# Patient Record
Sex: FEMALE | Race: BLACK OR AFRICAN AMERICAN | NOT HISPANIC OR LATINO | Employment: FULL TIME | URBAN - METROPOLITAN AREA
[De-identification: names, ages, dates, MRNs, and addresses within clinical notes are randomized per-mention and may not be internally consistent; named-entity substitution may affect disease eponyms.]

---

## 2017-01-11 ENCOUNTER — TELEPHONE (OUTPATIENT)
Dept: RHEUMATOLOGY | Facility: PHYSICIAN GROUP | Age: 52
End: 2017-01-11

## 2017-01-11 DIAGNOSIS — M17.11 PRIMARY OSTEOARTHRITIS OF RIGHT KNEE: ICD-10-CM
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
## 2017-01-11 DIAGNOSIS — S70.01XA CONTUSION OF RIGHT HIP, INITIAL ENCOUNTER: ICD-10-CM

## 2017-01-11 DIAGNOSIS — S80.01XA CONTUSION OF RIGHT KNEE, INITIAL ENCOUNTER: ICD-10-CM

## 2017-01-11 RX ORDER — TRAMADOL HYDROCHLORIDE 50 MG/1
50 TABLET ORAL EVERY 4 HOURS PRN
Qty: 30 TAB | Refills: 0 | Status: CANCELLED | OUTPATIENT
Start: 2017-01-11

## 2017-01-11 NOTE — TELEPHONE ENCOUNTER
Was the patient seen in the last year in this department? Yes   No recent labs    Does patient have an active prescription for medications requested? No     Received Request Via: Pharmacy

## 2017-01-11 NOTE — TELEPHONE ENCOUNTER
Patient asked for refill of tramadol, I am reluctant to refill this medication as patient has received tramadol from 2 other physicians other than myself since October 2016. Patient received tramadol refill from a Dr. Mindy Marino November 22, 2016 and also from Dr. Chicho Ashford December 30, 2016  Recommend patient establish with one physician only to receive her tramadol refills

## 2017-01-12 DIAGNOSIS — S70.01XA CONTUSION OF RIGHT HIP, INITIAL ENCOUNTER: ICD-10-CM
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
## 2017-01-12 DIAGNOSIS — S80.01XA CONTUSION OF RIGHT KNEE, INITIAL ENCOUNTER: ICD-10-CM

## 2017-01-12 RX ORDER — TRAMADOL HYDROCHLORIDE 50 MG/1
TABLET ORAL
Qty: 60 TAB | Refills: 0 | Status: SHIPPED | OUTPATIENT
Start: 2017-01-12 | End: 2017-03-16

## 2017-01-12 RX ORDER — TRAMADOL HYDROCHLORIDE 50 MG/1
50 TABLET ORAL EVERY 4 HOURS PRN
Qty: 30 TAB | Refills: 0 | Status: CANCELLED | OUTPATIENT
Start: 2017-01-12

## 2017-01-12 NOTE — TELEPHONE ENCOUNTER
Call patient, will refill tramadol, advised patient to be very careful re-doctor shopping for medications, patient states understanding  Tramadol prescription written for patient

## 2017-01-12 NOTE — TELEPHONE ENCOUNTER
Spoke with patient and relayed your message. Karine states that both of those Dr's/ scripts were from an urgent care visit and they don't refill medications. She states she was seen both times for falls.  She is requesting you to refill her Ultram. Please Advise and Thank you

## 2017-01-16 ENCOUNTER — TELEPHONE (OUTPATIENT)
Dept: MEDICAL GROUP | Age: 52
End: 2017-01-16

## 2017-01-16 DIAGNOSIS — E66.9 OBESITY (BMI 30-39.9): ICD-10-CM

## 2017-01-16 NOTE — TELEPHONE ENCOUNTER
1. Name of Medication & Dose: Phentermine 37.5 mg    2. Name of Prescription Coverage Company & Phone #: [employer]    3. Date Prior Auth Submitted: 01/16/17    4. What information was given to obtain insurance decision?Osteoarthritis of R knee, Obesity     5. Prior Auth Letter Approved or Denied? pending    6. Action Taken: Pharmacy/Patient Notified N\A

## 2017-01-18 NOTE — TELEPHONE ENCOUNTER
FINAL PRIOR AUTHORIZATION STATUS:    1.  Name of Medication & Dose: Phentermine 37.5 mg     2. Prior Auth Status: Approved through 1/1/17 - 12/31/17     3. Action Taken: Pharmacy/Patient Notified: yes

## 2017-02-06 ENCOUNTER — APPOINTMENT (OUTPATIENT)
Dept: RHEUMATOLOGY | Facility: PHYSICIAN GROUP | Age: 52
End: 2017-02-06
Payer: COMMERCIAL

## 2017-02-14 ENCOUNTER — OFFICE VISIT (OUTPATIENT)
Dept: URGENT CARE | Facility: CLINIC | Age: 52
End: 2017-02-14
Payer: COMMERCIAL

## 2017-02-14 VITALS
TEMPERATURE: 97.5 F | RESPIRATION RATE: 18 BRPM | OXYGEN SATURATION: 98 % | DIASTOLIC BLOOD PRESSURE: 80 MMHG | SYSTOLIC BLOOD PRESSURE: 128 MMHG | HEART RATE: 72 BPM

## 2017-02-14 DIAGNOSIS — L03.115 CELLULITIS OF HIP, RIGHT: ICD-10-CM
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
## 2017-02-14 DIAGNOSIS — S71.031A: ICD-10-CM

## 2017-02-14 PROCEDURE — 99213 OFFICE O/P EST LOW 20 MIN: CPT | Performed by: NURSE PRACTITIONER

## 2017-02-14 RX ORDER — AMOXICILLIN AND CLAVULANATE POTASSIUM 875; 125 MG/1; MG/1
1 TABLET, FILM COATED ORAL 2 TIMES DAILY
Qty: 20 TAB | Refills: 0 | Status: SHIPPED | OUTPATIENT
Start: 2017-02-14 | End: 2017-03-16

## 2017-02-14 RX ORDER — CEFTRIAXONE 1 G/1
500 INJECTION, POWDER, FOR SOLUTION INTRAMUSCULAR; INTRAVENOUS ONCE
Status: COMPLETED | OUTPATIENT
Start: 2017-02-14 | End: 2017-02-14

## 2017-02-14 RX ADMIN — CEFTRIAXONE 500 MG: 1 INJECTION, POWDER, FOR SOLUTION INTRAMUSCULAR; INTRAVENOUS at 18:40

## 2017-02-14 ASSESSMENT — ENCOUNTER SYMPTOMS
CHILLS: 0
FEVER: 0

## 2017-02-14 NOTE — MR AVS SNAPSHOT
Karine Ovalle   2017 6:00 PM   Office Visit   MRN: [id number]    Department:  [facility]   Dept Phone:  [phone]    Description:  Female : 1965   Provider:  Cathey J Hamman, A.P.N.           Reason for Visit     Fall Trip in a dog toy last night, fell down and hurt right hip, dog bite .      Allergies as of 2017     No Known Allergies      You were diagnosed with     Dog bite, initial encounter   [141500]       Puncture wound of hip, right, initial encounter   [308693]       Cellulitis of hip, right   [583414]         Vital Signs     Blood Pressure Pulse Temperature Respirations Oxygen Saturation Last Menstrual Period    128/80 mmHg 72 36.4 °C (97.5 °F) 18 98% 2016    Smoking Status                   Never Smoker            Basic Information     Date Of Birth Sex Race Ethnicity Preferred Language    1965 Female Black or  Non- English      Your appointments     Feb 15, 2017  8:45 AM   Follow Up Visit with Sandy Jenkins M.D.   [facility] (--)    [address], [address]  [facility] [address] [phone]           You will be receiving a confirmation call a few days before your appointment from our automated call confirmation system.            2017  2:20 PM   Established Patient with Micky Rubin M.D.   [address])    [address] [facility] [address] [phone]           You will be receiving a confirmation call a few days before your appointment from our automated call confirmation system.              Problem List              ICD-10-CM Priority Class Noted - Resolved    Mild intermittent asthma without complication J45.20 High  2012 - Present    Fibroids D25.9   2013 - Present    Primary osteoarthritis of right knee- sp right TKR - dr nowak M17.11   2013 - Present    Weight gain status post gastric bypass R63.5   2014 - Present    Vitamin  B 12 deficiency E53.8   6/2/2016 - Present    Obesity (BMI 30-39.9) E66.9   10/21/2016 - Present    BMI 39.0-39.9,adult Z68.39   12/2/2016 - Present    Throat irritation J39.2   12/2/2016 - Present      Health Maintenance        Date Due Completion Dates    IMM PNEUMOCOCCAL 19-64 (ADULT) MEDIUM RISK SERIES (1 of 1 - PPSV23) 5/17/1984 ---    COLONOSCOPY 5/17/2015 ---    PAP SMEAR 7/9/2016 7/9/2013, 10/14/2011, 8/10/2010    IMM INFLUENZA (1) 9/1/2016 ---    MAMMOGRAM 6/30/2018 6/30/2016 (Done), 8/19/2014, 7/26/2013, 7/6/2012, 8/20/2010, 8/20/2010, 9/30/2008, 9/30/2008, 10/25/2007, 10/25/2007    Override on 6/30/2016: Done    IMM DTaP/Tdap/Td Vaccine (2 - Td) 5/7/2022 5/7/2012            Current Immunizations     Tdap Vaccine 5/7/2012      Below and/or attached are the medications your provider expects you to take. Review all of your home medications and newly ordered medications with your provider and/or pharmacist. Follow medication instructions as directed by your provider and/or pharmacist. Please keep your medication list with you and share with your provider. Update the information when medications are discontinued, doses are changed, or new medications (including over-the-counter products) are added; and carry medication information at all times in the event of emergency situations     Allergies:  No Known Allergies          Medications  Valid as of: February 14, 2017 -  6:40 PM    Generic Name Brand Name Tablet Size Instructions for use    Amoxicillin-Pot Clavulanate (Tab) AUGMENTIN 875-125 MG Take 1 Tab by mouth 2 times a day.        Cholecalciferol (Tab) cholecalciferol 1000 UNIT Take 1,000 Units by mouth every day.        Erythromycin (Ointment) erythromycin 5 MG/GM Apply 1 cm ribbon to inner lower eyelid QID x 5 days.        Furosemide (Tab) LASIX 20 MG TAKE ONE TABLET BY MOUTH ONCE DAILY        Gabapentin (Cap) NEURONTIN 300 MG 1 tab po qhs        Meloxicam (Tab) MOBIC 7.5 MG Take 1 Tab by mouth every day.         Multiple Vitamin (Tab) THERAGRAN  Take 1 Tab by mouth every day.        Olopatadine HCl (Solution) PATANOL 0.1 % Place 1 Drop in both eyes 2 times a day.        Omega-3 Fatty Acids (Cap) fish oil 1000 MG Take 3,000 mg by mouth every day.        Phentermine HCl (Cap) phentermine 37.5 MG Take 1 Cap by mouth every morning.        Potassium Chloride (Tab CR) KLOR-CON 10 MEQ TAKE ONE TABLET BY MOUTH ONCE DAILY        TraMADol HCl (Tab) ULTRAM 50 MG One tab by mouth twice a day for SEVERE pain, NO DRIVING and NO ALCOHOL within 24 hrs of taking this medication, NO BENZODIAZEPINE MEDICATIONS, no selling or giving to any other persons        .                 Medicines prescribed today were sent to:     [facility] [address] [place] [address] (S), [place] - [address]    [facility] [facility] [place] ([place] [place] [address]    Phone: [phone] Fax: [phone]    Open 24 Hours?: No      Medication refill instructions:       If your prescription bottle indicates you have medication refills left, it is not necessary to call your provider’s office. Please contact your pharmacy and they will refill your medication.    If your prescription bottle indicates you do not have any refills left, you may request refills at any time through one of the following ways: The online Abril system (except Urgent Care), by calling your provider’s office, or by asking your pharmacy to contact your provider’s office with a refill request. Medication refills are processed only during regular business hours and may not be available until the next business day. Your provider may request additional information or to have a follow-up visit with you prior to refilling your medication.   *Please Note: Medication refills are assigned a new Rx number when refilled electronically. Your pharmacy may indicate that no refills were authorized even though a new prescription for the same medication is available at the pharmacy. Please request the medicine by name with the  pharmacy before contacting your provider for a refill.           Brainient Access Code: [email or web address]  Expires: 2/26/2017 10:33 AM    Brainient  A secure, online tool to manage your health information     [employer]’s Brainient® is a secure, online tool that connects you to your personalized health information from the privacy of your home -- day or night - making it very easy for you to manage your healthcare. Once the activation process is completed, you can even access your medical information using the Brainient medardo, which is available for free in the Apple Medardo store or Google Play store.     Brainient provides the following levels of access (as shown below):   My Chart Features   [facility] Doctor [facility]  Specialists [facility]  Urgent  Care [facility]  Primary Care  Doctor   Email your healthcare team securely and privately 24/7 X X X    Manage appointments: schedule your next appointment; view details of past/upcoming appointments X      Request prescription refills. X      View recent personal medical records, including lab and immunizations X X X X   View health record, including health history, allergies, medications X X X X   Read reports about your outpatient visits, procedures, consult and ER notes X X X X   See your discharge summary, which is a recap of your hospital and/or ER visit that includes your diagnosis, lab results, and care plan. X X       How to register for Brainient:  1. Go to  [email or web address].  2. Click on the Sign Up Now box, which takes you to the New Member Sign Up page. You will need to provide the following information:  a. Enter your Brainient Access Code exactly as it appears at the top of this page. (You will not need to use this code after you’ve completed the sign-up process. If you do not sign up before the expiration date, you must request a new code.)   b. Enter your date of birth.   c. Enter your home email address.   d. Click Submit, and follow the next screen’s  instructions.  3. Create a OneRecruitt ID. This will be your OneRecruitt login ID and cannot be changed, so think of one that is secure and easy to remember.  4. Create a OneRecruitt password. You can change your password at any time.  5. Enter your Password Reset Question and Answer. This can be used at a later time if you forget your password.   6. Enter your e-mail address. This allows you to receive e-mail notifications when new information is available in Teradici.  7. Click Sign Up. You can now view your health information.    For assistance activating your Teradici account, call [phone]

## 2017-02-15 ENCOUNTER — APPOINTMENT (OUTPATIENT)
Dept: RHEUMATOLOGY | Facility: PHYSICIAN GROUP | Age: 52
End: 2017-02-15
Payer: COMMERCIAL

## 2017-02-15 NOTE — PROGRESS NOTES
Subjective:      Karine Ovalle is a 51 y.o. female who presents with Fall    Past Medical History   Diagnosis Date   • Arthritis      right knee   • Dental disorder      partial upper   • Pain      left knee and arthritis pain     Social History     Social History   • Marital Status: Single     Spouse Name: N/A   • Number of Children: N/A   • Years of Education: N/A     Occupational History   • Not on file.     Social History Main Topics   • Smoking status: Never Smoker    • Smokeless tobacco: Never Used   • Alcohol Use: 2.0 oz/week     4 Glasses of wine per week      Comment: 4 drinks a week   • Drug Use: No   • Sexual Activity: Not on file     Other Topics Concern   • Not on file     Social History Narrative    ** Merged History Encounter **          Family History   Problem Relation Age of Onset   • Diabetes Mother    • Heart Disease Mother      Allergies: Review of patient's allergies indicates no known allergies.    This patient 51-year-old female who presents today with complaint of pain and bruising and abrasion over the lateral right hip/buttock area. This happened last night. Patient states that she tripped over one of her dogs toys and fell on her right hip.. After falling, her dog at her. The dog that bit her was a Chihuahua puppy. The animal is current on its immunizations. Patient is current concerned about 18 And also infection. States that the last time she was bitten she had to receive a shot of Rocephin and she is requesting today. States the area surrounding the bite is very painful. She denies any fever, aches, or chills. She denies other injuries at this time.        Fall  The accident occurred 12 to 24 hours ago. Distance fallen: ground level fall. She landed on carpet. There was no blood loss. Point of impact: right hip/lateral buttocks. Pain location: right hip/lateral buttocks. The pain is moderate. Pertinent negatives include no fever. She has tried nothing for the symptoms. The  treatment provided no relief.       Review of Systems   Constitutional: Negative for fever and chills.   Musculoskeletal:        Pain, contusion, bruising, and abrasion to the right lateral hip/buttocks area         All other systems reviewed and are negative      Objective:     LMP 01/02/2016     Physical Exam   Constitutional: She is oriented to person, place, and time. She appears well-developed and well-nourished. No distress.   Musculoskeletal:        Right upper leg: She exhibits tenderness.        Legs:  Soft tissue contusion noted. There is a superficial abrasion with slight redness. No purulence or drainage.   Neurological: She is alert and oriented to person, place, and time.   Skin: Skin is warm and dry. She is not diaphoretic.   Psychiatric: She has a normal mood and affect. Her behavior is normal.   Vitals reviewed.              Assessment/Plan:   Right hip contusion  Superficial abrasion, right hip  Puncture wound, right hip  Dog bite, right hip  -Rocephin IM  -Augmentin by mouth may start tomorrow  -Keep wound clean, dry, and covered  -Return to urgent care for any increase in swelling, pain, or redness  There are no diagnoses linked to this encounter.

## 2017-02-16 ENCOUNTER — OFFICE VISIT (OUTPATIENT)
Dept: URGENT CARE | Facility: CLINIC | Age: 52
End: 2017-02-16
Payer: COMMERCIAL

## 2017-02-16 ENCOUNTER — HOSPITAL ENCOUNTER (OUTPATIENT)
Dept: RADIOLOGY | Facility: MEDICAL CENTER | Age: 52
End: 2017-02-16
Attending: PHYSICIAN ASSISTANT
Payer: COMMERCIAL

## 2017-02-16 VITALS
WEIGHT: 224 LBS | BODY MASS INDEX: 39.69 KG/M2 | RESPIRATION RATE: 16 BRPM | DIASTOLIC BLOOD PRESSURE: 80 MMHG | HEART RATE: 101 BPM | OXYGEN SATURATION: 98 % | SYSTOLIC BLOOD PRESSURE: 128 MMHG | TEMPERATURE: 98.4 F
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
## 2017-02-16 DIAGNOSIS — M25.562 ACUTE PAIN OF LEFT KNEE: ICD-10-CM

## 2017-02-16 PROCEDURE — 99214 OFFICE O/P EST MOD 30 MIN: CPT | Performed by: PHYSICIAN ASSISTANT

## 2017-02-16 PROCEDURE — 73562 X-RAY EXAM OF KNEE 3: CPT | Mod: LT

## 2017-02-16 RX ORDER — TRAMADOL HYDROCHLORIDE 50 MG/1
TABLET ORAL
Qty: 30 TAB | Refills: 0 | Status: SHIPPED | OUTPATIENT
Start: 2017-02-16 | End: 2017-03-09

## 2017-02-16 ASSESSMENT — ENCOUNTER SYMPTOMS
EYES NEGATIVE: 1
CARDIOVASCULAR NEGATIVE: 1
CONSTITUTIONAL NEGATIVE: 1
NEUROLOGICAL NEGATIVE: 1
PSYCHIATRIC NEGATIVE: 1
MUSCULOSKELETAL NEGATIVE: 1
GASTROINTESTINAL NEGATIVE: 1

## 2017-02-16 NOTE — PROGRESS NOTES
Subjective:      Karine Ovalle is a 51 y.o. female who presents with Fall        Fall      Chief Complaint   Patient presents with   • Fall     fell this morning hurt left knee       HPI:  Karine Ovalle is a 51 y.o. Female who presents with new fall that occurred this morning.  She had a mechanical ground level fall landing on her left knee onto a carpeted surface.  She heard something pop.  Left knee feels swollen.  She denies hitting her head or LOC.  She has fallen before, usually due to tripping over dog toys etc.  She was seen 2 days ago for a similar incident but incurred a dog bite at the time to her right hip.  She received rocephin in clinic and augmentin rx.  She is taking this.  No fever, chills, nausea or vomiting.  She is on phentermine at this time (filled 2/9) and filled a tramadol rx for 60 tabs (1/12) .      Right knee, s/p TKA    Past Medical History   Diagnosis Date   • Arthritis      right knee   • Dental disorder      partial upper   • Pain      left knee and arthritis pain       Past Surgical History   Procedure Laterality Date   • Gastric bypass laparoscopic  2002   • Abdominal exploration     • Plastic surgery  2004     excess skin on arms and legs removed   • Debridement  5/17/2012     Performed by BRADLEY DYKES at SURGERY [facility]   • Pr enlarge breast with implant  2004   • Appendectomy laparoscopic  3/21/2013     Performed by Dali Queen M.D. at [facility]   • Knee arthroplasty total Right 10/20/2015     Procedure: KNEE ARTHROPLASTY TOTAL;  Surgeon: Bryon Levine M.D.;  Location: SURGERY [facility];  Service:        Family History   Problem Relation Age of Onset   • Diabetes Mother    • Heart Disease Mother        Social History     Social History   • Marital Status: Single     Spouse Name: N/A   • Number of Children: N/A   • Years of Education: N/A     Occupational History   • Not on file.     Social History Main Topics   • Smoking status:  Never Smoker    • Smokeless tobacco: Never Used   • Alcohol Use: 2.0 oz/week     4 Glasses of wine per week      Comment: 4 drinks a week   • Drug Use: No   • Sexual Activity: Not on file     Other Topics Concern   • Not on file     Social History Narrative    ** Merged History Encounter **              Current outpatient prescriptions:   •  amoxicillin-clavulanate, 1 Tab, Oral, BID  •  tramadol, One tab by mouth twice a day for SEVERE pain, NO DRIVING and NO ALCOHOL within 24 hrs of taking this medication, NO BENZODIAZEPINE MEDICATIONS, no selling or giving to any other persons  •  meloxicam, 7.5 mg, Oral, DAILY  •  phentermine, 37.5 mg, Oral, QAM  •  gabapentin, 1 tab po qhs  •  olopatadine, 1 Drop, Both Eyes, BID (Patient not taking: Reported on 12/2/2016)  •  furosemide, TAKE ONE TABLET BY MOUTH ONCE DAILY  •  potassium chloride ER, TAKE ONE TABLET BY MOUTH ONCE DAILY, prn  •  erythromycin, Apply 1 cm ribbon to inner lower eyelid QID x 5 days.  •  fish oil, 3,000 mg, Oral, DAILY  •  multivitamin, 1 Tab, Oral, DAILY  •  vitamin D, 1,000 Units, Oral, DAILY    No Known Allergies         Review of Systems   Constitutional: Negative.    HENT: Negative.    Eyes: Negative.    Cardiovascular: Negative.    Gastrointestinal: Negative.    Genitourinary: Negative.    Musculoskeletal: Negative.    Skin: Negative.    Neurological: Negative.    Endo/Heme/Allergies: Negative.    Psychiatric/Behavioral: Negative.           Objective:     /80 mmHg  Pulse 101  Temp(Src) 36.9 °C (98.4 °F)  Resp 16  Wt 101.606 kg (224 lb)  SpO2 98%  LMP 01/02/2016     Physical Exam       Constitutional:  Appropriately groomed, pleasant affect, well nourished, and in no acute distress.    HEENT:  Head: Atraumatic, normocephalic.    Ears:  Hearing grossly intact to voice.    Eyes:  Conjunctivae clear, sclera white, and medial canthus without exudate bilaterally.      Lungs:  Lungs with normal respiratory excursion and effort.      Left  Knee:  Mild swelling, no erythema.  No ecchymosis present.  No ttp across the joint line.  Ttp over the patellar-tibial ligament.  Mild ttp over the lateral collateral lig.  Knee stable with varus and valgus stress.      ROM: Extension 0, Flexion 70.   Patella tracking without crepitus.   No calf tenderness or clinical evidence of a DVT.      Motor Examination: Quad/hamstring 5/5 without atrophy.     Special Tests: Negative straight leg raise.  Negative bilateral knee valgus and varus stress, McMurrays, and  lachman’s.      Sensation equal bilaterally to light touch for L4, L5, and S1.      NVS intact, DP 2+.  Capillary refill <2 seconds.        Gait and station antalgic utilizing cane.    Derm:  Right hip with no significant ecchymosis. Area of 37 L by 3.5 cm with superficial skin breakdown and sloughing. Underlying tissue with good epithelialization. No evidence of induration, purulent discharge, warmth, or abscess formation. No rashes.  Overall good turgor pressure.     Psychiatric:  Normal judgement, mood and affect.    2/16/2017 2:10 PM    HISTORY/REASON FOR EXAM:  Left knee pain after tripping injury yesterday.    TECHNIQUE/EXAM DESCRIPTION AND NUMBER OF VIEWS: 3 views of the LEFT knee.    COMPARISON: None    FINDINGS:  Bone density is osteopenic There is no evidence of fracture or dislocation. There is tricompartment degenerative change characterized by osteophytic spurring and subchondral sclerosis. There is a small-to-moderate joint effusion. There is a possible   small ossific loose body within the joint space anteriorly.         Impression        1.  No evidence of acute fracture or dislocation.    2.  Tricompartment degenerative change.    3.  Joint effusion.    4.  Possible intra-articular ossific loose body.         Reading Provider Reading Date     Yoel Cherry M.D. Feb 16, 2017            Assessment/Plan:     1. Acute pain of left knee  Patient presents with left knee pain after a ground-level  mechanical fall on to her tile floor. Patient states that she tripped over a dog toy. No twisting mechanism during the fall. Patient has point tenderness over the lateral aspect of the left knee with some tenderness over the patella/tibial ligament and lateral c collateral ligament. Knee is stable. No evidence of DVT. On x-ray, reviewed with patient and by me, shows no acute fracture but there is lateral malleolus spurring and a joint effusion. Recommended icing and continue with Mobic. Refill patient's tramadol as I didn't feel that this was necessary for her treatment. Did place a brace on patient to help with stability and compression. Recommended elevation. Did refer to orthopedics given patient's history of arthritis and status post right TKA.    - DX-KNEE 3 VIEWS LEFT; Future  - tramadol (ULTRAM) 50 MG Tab; 1 tablet PO once daily prn pain.  Dispense: 30 Tab; Refill: 0    2. Dog bite, subsequent encounter  Patient presents for recheck of dog bite/superficial skin breakdown on right hip. Improving with no significant sign of infection. Recommended continue with Augmentin until done. Redressed wound with Neosporin, Telfa, and Tegaderm. Recommended patient continue to cover and protect. Patient will likely lose her outer epidermis layer but has good epithelialization. Area involves approximately 3 cm x 3.5 cm.    Patient was in agreement with this treatment plan and seemed to understand without barriers. All questions were encouraged and answered.  Reviewed signs and symptoms of when to seek emergency medical care.     Please note that this dictation was created using voice recognition software.  I have made every reasonable attempt to correct obvious errors, but I expect there are errors of elvira and possibly content that I did not discover before finalizing the note.

## 2017-02-16 NOTE — MR AVS SNAPSHOT
Karine Ovalle   2017 12:00 PM   Office Visit   MRN: [id number]    Department:  [facility]   Dept Phone:  [phone]    Description:  Female : 1965   Provider:  Federico Jones PA-C           Reason for Visit     Fall fell this morning hurt left knee      Allergies as of 2017     No Known Allergies      You were diagnosed with     Acute pain of left knee   [1969227]       Dog bite, subsequent encounter   [[id number]]         Vital Signs     Blood Pressure Pulse Temperature Respirations Weight Oxygen Saturation    128/80 mmHg 101 36.9 °C (98.4 °F) 16 101.606 kg (224 lb) 98%    Last Menstrual Period Smoking Status                2016 Never Smoker           Basic Information     Date Of Birth Sex Race Ethnicity Preferred Language    1965 Female Black or  Non- English      Your appointments     Mar 06, 2017  8:00 AM   Follow Up Visit with Sandy Jenkins M.D.   [facility] (--)    [address] [facility] [facility] [address]  [facility] [address] [phone]           You will be receiving a confirmation call a few days before your appointment from our automated call confirmation system.            2017  2:20 PM   Established Patient with Micky Rubin M.D.   [address])    [address] [facility] [address] [phone]           You will be receiving a confirmation call a few days before your appointment from our automated call confirmation system.              Problem List              ICD-10-CM Priority Class Noted - Resolved    Mild intermittent asthma without complication J45.20 High  2012 - Present    Fibroids D25.9   2013 - Present    Primary osteoarthritis of right knee- sp right TKR - dr nowak M17.11   2013 - Present    Weight gain status post gastric bypass R63.5   2014 - Present    Vitamin B 12 deficiency E53.8   2016 - Present    Obesity (BMI 30-39.9) E66.9    10/21/2016 - Present    BMI 39.0-39.9,adult Z68.39   12/2/2016 - Present    Throat irritation J39.2   12/2/2016 - Present      Health Maintenance        Date Due Completion Dates    IMM PNEUMOCOCCAL 19-64 (ADULT) MEDIUM RISK SERIES (1 of 1 - PPSV23) 5/17/1984 ---    COLONOSCOPY 5/17/2015 ---    PAP SMEAR 7/9/2016 7/9/2013, 10/14/2011, 8/10/2010    IMM INFLUENZA (1) 9/1/2016 ---    MAMMOGRAM 6/30/2018 6/30/2016 (Done), 8/19/2014, 7/26/2013, 7/6/2012, 8/20/2010, 8/20/2010, 9/30/2008, 9/30/2008, 10/25/2007, 10/25/2007    Override on 6/30/2016: Done    IMM DTaP/Tdap/Td Vaccine (2 - Td) 5/7/2022 5/7/2012            Current Immunizations     Tdap Vaccine 5/7/2012      Below and/or attached are the medications your provider expects you to take. Review all of your home medications and newly ordered medications with your provider and/or pharmacist. Follow medication instructions as directed by your provider and/or pharmacist. Please keep your medication list with you and share with your provider. Update the information when medications are discontinued, doses are changed, or new medications (including over-the-counter products) are added; and carry medication information at all times in the event of emergency situations     Allergies:  No Known Allergies          Medications  Valid as of: February 17, 2017 -  6:51 PM    Generic Name Brand Name Tablet Size Instructions for use    Amoxicillin-Pot Clavulanate (Tab) AUGMENTIN 875-125 MG Take 1 Tab by mouth 2 times a day.        Cholecalciferol (Tab) cholecalciferol 1000 UNIT Take 1,000 Units by mouth every day.        Erythromycin (Ointment) erythromycin 5 MG/GM Apply 1 cm ribbon to inner lower eyelid QID x 5 days.        Furosemide (Tab) LASIX 20 MG TAKE ONE TABLET BY MOUTH ONCE DAILY        Gabapentin (Cap) NEURONTIN 300 MG 1 tab po qhs        Meloxicam (Tab) MOBIC 7.5 MG Take 1 Tab by mouth every day.        Multiple Vitamin (Tab) THERAGRAN  Take 1 Tab by mouth every day.         Olopatadine HCl (Solution) PATANOL 0.1 % Place 1 Drop in both eyes 2 times a day.        Omega-3 Fatty Acids (Cap) fish oil 1000 MG Take 3,000 mg by mouth every day.        Phentermine HCl (Cap) phentermine 37.5 MG Take 1 Cap by mouth every morning.        Potassium Chloride (Tab CR) KLOR-CON 10 MEQ TAKE ONE TABLET BY MOUTH ONCE DAILY        TraMADol HCl (Tab) ULTRAM 50 MG One tab by mouth twice a day for SEVERE pain, NO DRIVING and NO ALCOHOL within 24 hrs of taking this medication, NO BENZODIAZEPINE MEDICATIONS, no selling or giving to any other persons        TraMADol HCl (Tab) ULTRAM 50 MG 1 tablet PO once daily prn pain.        .                 Medicines prescribed today were sent to:     [facility] [address] [place] [address] (S), [place] [employer] [facility] [employer]    [facility] [place] (S) [place] [address]    Phone: [phone] Fax: [phone]    Open 24 Hours?: No      Medication refill instructions:       If your prescription bottle indicates you have medication refills left, it is not necessary to call your provider’s office. Please contact your pharmacy and they will refill your medication.    If your prescription bottle indicates you do not have any refills left, you may request refills at any time through one of the following ways: The online [facility] system (except Urgent Care), by calling your provider’s office, or by asking your pharmacy to contact your provider’s office with a refill request. Medication refills are processed only during regular business hours and may not be available until the next business day. Your provider may request additional information or to have a follow-up visit with you prior to refilling your medication.   *Please Note: Medication refills are assigned a new Rx number when refilled electronically. Your pharmacy may indicate that no refills were authorized even though a new prescription for the same medication is available at the pharmacy. Please request the medicine by name with the  pharmacy before contacting your provider for a refill.        Your To Do List     Future Labs/Procedures Complete By Expires    DX-KNEE 3 VIEWS LEFT  As directed 2/16/2018      Referral     A referral request has been sent to our patient care coordination department. Please allow 3-5 business days for us to process this request and contact you either by phone or mail. If you do not hear from us by the 5th business day, please call us at [phone].        Instructions    Continue with mobic, and tramadol as needed for pain.  Don't drive with tramadol.  Elevate and ice.  Referral to ortho.    Knee Pain  Knee pain is a very common symptom and can have many causes. Knee pain often goes away when you follow your health care provider's instructions for relieving pain and discomfort at home. However, knee pain can develop into a condition that needs treatment. Some conditions may include:  · Arthritis caused by wear and tear (osteoarthritis).  · Arthritis caused by swelling and irritation (rheumatoid arthritis or gout).  · A cyst or growth in your knee.  · An infection in your knee joint.  · An injury that will not heal.  · Damage, swelling, or irritation of the tissues that support your knee (torn ligaments or tendinitis).  If your knee pain continues, additional tests may be ordered to diagnose your condition. Tests may include X-rays or other imaging studies of your knee. You may also need to have fluid removed from your knee. Treatment for ongoing knee pain depends on the cause, but treatment may include:  · Medicines to relieve pain or swelling.  · Steroid injections in your knee.  · Physical therapy.  · Surgery.  HOME CARE INSTRUCTIONS  · Take medicines only as directed by your health care provider.  · Rest your knee and keep it raised (elevated) while you are resting.  · Do not do things that cause or worsen pain.  · Avoid high-impact activities or exercises, such as running, jumping rope, or doing jumping  jacks.  · Apply ice to the knee area:  ¨ Put ice in a plastic bag.  ¨ Place a towel between your skin and the bag.  ¨ Leave the ice on for 20 minutes, 2-3 times a day.  · Ask your health care provider if you should wear an elastic knee support.  · Keep a pillow under your knee when you sleep.  · Lose weight if you are overweight. Extra weight can put pressure on your knee.  · Do not use any tobacco products, including cigarettes, chewing tobacco, or electronic cigarettes. If you need help quitting, ask your health care provider. Smoking may slow the healing of any bone and joint problems that you may have.  SEEK MEDICAL CARE IF:  · Your knee pain continues, changes, or gets worse.  · You have a fever along with knee pain.  · Your knee tameka or locks up.  · Your knee becomes more swollen.  SEEK IMMEDIATE MEDICAL CARE IF:   · Your knee joint feels hot to the touch.  · You have chest pain or trouble breathing.     This information is not intended to replace advice given to you by your health care provider. Make sure you discuss any questions you have with your health care provider.     Document Released: 10/14/2008 Document Revised: 01/08/2016 Document Reviewed: 08/03/2015  [employer] Interactive Patient Education ©2016 Elsevier Inc.            [employer] Access Code: Activation code not generated  Current Sleep Number Status: Active

## 2017-02-16 NOTE — PATIENT INSTRUCTIONS
Continue with mobic, and tramadol as needed for pain.  Don't drive with tramadol.  Elevate and ice.  Referral to ortho.    Knee Pain  Knee pain is a very common symptom and can have many causes. Knee pain often goes away when you follow your health care provider's instructions for relieving pain and discomfort at home. However, knee pain can develop into a condition that needs treatment. Some conditions may include:  · Arthritis caused by wear and tear (osteoarthritis).  · Arthritis caused by swelling and irritation (rheumatoid arthritis or gout).  · A cyst or growth in your knee.  · An infection in your knee joint.  · An injury that will not heal.  · Damage, swelling, or irritation of the tissues that support your knee (torn ligaments or tendinitis).  If your knee pain continues, additional tests may be ordered to diagnose your condition. Tests may include X-rays or other imaging studies of your knee. You may also need to have fluid removed from your knee. Treatment for ongoing knee pain depends on the cause, but treatment may include:  · Medicines to relieve pain or swelling.  · Steroid injections in your knee.  · Physical therapy.  · Surgery.  HOME CARE INSTRUCTIONS  · Take medicines only as directed by your health care provider.  · Rest your knee and keep it raised (elevated) while you are resting.  · Do not do things that cause or worsen pain.  · Avoid high-impact activities or exercises, such as running, jumping rope, or doing jumping jacks.  · Apply ice to the knee area:  ¨ Put ice in a plastic bag.  ¨ Place a towel between your skin and the bag.  ¨ Leave the ice on for 20 minutes, 2-3 times a day.  · Ask your health care provider if you should wear an elastic knee support.  · Keep a pillow under your knee when you sleep.  · Lose weight if you are overweight. Extra weight can put pressure on your knee.  · Do not use any tobacco products, including cigarettes, chewing tobacco, or electronic cigarettes. If you  need help quitting, ask your health care provider. Smoking may slow the healing of any bone and joint problems that you may have.  SEEK MEDICAL CARE IF:  · Your knee pain continues, changes, or gets worse.  · You have a fever along with knee pain.  · Your knee tameka or locks up.  · Your knee becomes more swollen.  SEEK IMMEDIATE MEDICAL CARE IF:   · Your knee joint feels hot to the touch.  · You have chest pain or trouble breathing.     This information is not intended to replace advice given to you by your health care provider. Make sure you discuss any questions you have with your health care provider.     Document Released: 10/14/2008 Document Revised: 01/08/2016 Document Reviewed: 08/03/2015  [employer] Interactive Patient Education ©2016 Elsevier Inc.

## 2017-02-16 NOTE — Clinical Note
February 16, 2017         Patient: Karine Ovalle   YOB: 1965   Date of Visit: 2/16/2017           To Whom it May Concern:    Karine Ovalle was seen in my clinic on 2/16/2017. Please excuse her from work today.    If you have any questions or concerns, please don't hesitate to call.        Sincerely,           Federico Jones PA-C  Electronically Signed

## 2017-03-16 ENCOUNTER — OFFICE VISIT (OUTPATIENT)
Dept: URGENT CARE | Facility: CLINIC | Age: 52
End: 2017-03-16
Payer: COMMERCIAL

## 2017-03-16 VITALS
BODY MASS INDEX: 39.69 KG/M2 | RESPIRATION RATE: 16 BRPM | WEIGHT: 224 LBS | OXYGEN SATURATION: 100 % | DIASTOLIC BLOOD PRESSURE: 90 MMHG | HEIGHT: 63 IN | TEMPERATURE: 97.5 F | SYSTOLIC BLOOD PRESSURE: 130 MMHG | HEART RATE: 95 BPM

## 2017-03-16 DIAGNOSIS — J01.40 ACUTE NON-RECURRENT PANSINUSITIS: Primary | ICD-10-CM

## 2017-03-16 DIAGNOSIS — M25.551 CHRONIC RIGHT HIP PAIN: ICD-10-CM

## 2017-03-16 DIAGNOSIS — J06.9 URI WITH COUGH AND CONGESTION: ICD-10-CM

## 2017-03-16 DIAGNOSIS — G89.29 CHRONIC RIGHT HIP PAIN: ICD-10-CM

## 2017-03-16 DIAGNOSIS — H66.002 ACUTE SUPPURATIVE OTITIS MEDIA OF LEFT EAR WITHOUT SPONTANEOUS RUPTURE OF TYMPANIC MEMBRANE, RECURRENCE NOT SPECIFIED: ICD-10-CM

## 2017-03-16 PROCEDURE — 99214 OFFICE O/P EST MOD 30 MIN: CPT | Performed by: PHYSICIAN ASSISTANT

## 2017-03-16 RX ORDER — TRAMADOL HYDROCHLORIDE 50 MG/1
50 TABLET ORAL EVERY 4 HOURS PRN
Qty: 30 TAB | Refills: 0 | Status: SHIPPED | OUTPATIENT
Start: 2017-03-16 | End: 2017-03-28

## 2017-03-16 RX ORDER — CODEINE PHOSPHATE/GUAIFENESIN 10-100MG/5
5 LIQUID (ML) ORAL 3 TIMES DAILY PRN
Qty: 120 ML | Refills: 0 | Status: SHIPPED | OUTPATIENT
Start: 2017-03-16 | End: 2017-03-21

## 2017-03-16 RX ORDER — AMOXICILLIN AND CLAVULANATE POTASSIUM 875; 125 MG/1; MG/1
1 TABLET, FILM COATED ORAL 2 TIMES DAILY
Qty: 20 TAB | Refills: 0 | Status: SHIPPED | OUTPATIENT
Start: 2017-03-16 | End: 2017-03-26

## 2017-03-16 NOTE — MR AVS SNAPSHOT
"        Karine Fox Dobbs   3/16/2017 7:00 PM   Office Visit   MRN: [id number]    Department:  [facility]   Dept Phone:  [phone]    Description:  Female : 1965   Provider:  Dejon Valentino PA-C           Reason for Visit     Sinus Problem facial pain, loss of voice, started with cold, congestion, wheezing, x2days      Allergies as of 3/16/2017     No Known Allergies      You were diagnosed with     Acute non-recurrent pansinusitis   [[id number]]  -  Primary     Acute suppurative otitis media of left ear without spontaneous rupture of tympanic membrane, recurrence not specified   [[id number]]       URI with cough and congestion   [[id number]]       Chronic right hip pain   [226687]         Vital Signs     Blood Pressure Pulse Temperature Respirations Height Weight    130/90 mmHg 95 36.4 °C (97.5 °F) 16 1.6 m (5' 2.99\") 101.606 kg (224 lb)    Body Mass Index Oxygen Saturation Last Menstrual Period Smoking Status          39.69 kg/m2 100% 2016 Never Smoker         Basic Information     Date Of Birth Sex Race Ethnicity Preferred Language    1965 Female Black or  Non- English      Your appointments     Mar 28, 2017  3:00 PM   Follow Up Visit with Sandy Jenkins M.D.   [facility] (--)    [address] [facility] [address]  [facility] [address] [phone]           You will be receiving a confirmation call a few days before your appointment from our automated call confirmation system.            2017  2:20 PM   Established Patient with Micky Rubin M.D.   [address])    [address] [facility] [address] [phone]           You will be receiving a confirmation call a few days before your appointment from our automated call confirmation system.              Problem List              ICD-10-CM Priority Class Noted - Resolved    Mild intermittent asthma without complication J45.20 High  2012 - Present   " Fibroids D25.9   8/13/2013 - Present    Primary osteoarthritis of right knee- sp right TKR - dr nowak M17.11   8/13/2013 - Present    Weight gain status post gastric bypass R63.5   8/12/2014 - Present    Vitamin B 12 deficiency E53.8   6/2/2016 - Present    Obesity (BMI 30-39.9) E66.9   10/21/2016 - Present    BMI 39.0-39.9,adult Z68.39   12/2/2016 - Present    Throat irritation J39.2   12/2/2016 - Present      Health Maintenance        Date Due Completion Dates    IMM PNEUMOCOCCAL 19-64 (ADULT) MEDIUM RISK SERIES (1 of 1 - PPSV23) 5/17/1984 ---    COLONOSCOPY 5/17/2015 ---    PAP SMEAR 7/9/2016 7/9/2013, 10/14/2011, 8/10/2010    IMM INFLUENZA (1) 9/1/2016 ---    MAMMOGRAM 6/30/2018 6/30/2016 (Done), 8/19/2014, 7/26/2013, 7/6/2012, 8/20/2010, 8/20/2010, 9/30/2008, 9/30/2008, 10/25/2007, 10/25/2007    Override on 6/30/2016: Done    IMM DTaP/Tdap/Td Vaccine (2 - Td) 5/7/2022 5/7/2012            Current Immunizations     Tdap Vaccine 5/7/2012      Below and/or attached are the medications your provider expects you to take. Review all of your home medications and newly ordered medications with your provider and/or pharmacist. Follow medication instructions as directed by your provider and/or pharmacist. Please keep your medication list with you and share with your provider. Update the information when medications are discontinued, doses are changed, or new medications (including over-the-counter products) are added; and carry medication information at all times in the event of emergency situations     Allergies:  No Known Allergies          Medications  Valid as of: March 16, 2017 -  7:22 PM    Generic Name Brand Name Tablet Size Instructions for use    Amoxicillin-Pot Clavulanate (Tab) AUGMENTIN 875-125 MG Take 1 Tab by mouth 2 times a day for 10 days.        Cholecalciferol (Tab) cholecalciferol 1000 UNIT Take 1,000 Units by mouth every day.        Erythromycin (Ointment) erythromycin 5 MG/GM Apply 1 cm ribbon to  inner lower eyelid QID x 5 days.        Furosemide (Tab) LASIX 20 MG TAKE ONE TABLET BY MOUTH ONCE DAILY        Gabapentin (Cap) NEURONTIN 300 MG 1 tab po qhs        Guaifenesin-Codeine (Syrup) TUSSI-ORGANIDIN -10 MG/5ML Take 5 mL by mouth 3 times a day as needed for Cough for up to 5 days.        Meloxicam (Tab) MOBIC 7.5 MG Take 1 Tab by mouth every day.        Multiple Vitamin (Tab) THERAGRAN  Take 1 Tab by mouth every day.        Olopatadine HCl (Solution) PATANOL 0.1 % Place 1 Drop in both eyes 2 times a day.        Omega-3 Fatty Acids (Cap) fish oil 1000 MG Take 3,000 mg by mouth every day.        Phentermine HCl (Cap) phentermine 37.5 MG Take 1 Cap by mouth every morning.        Potassium Chloride (Tab CR) KLOR-CON 10 MEQ TAKE ONE TABLET BY MOUTH ONCE DAILY        TraMADol HCl (Tab) ULTRAM 50 MG Take 1 Tab by mouth every four hours as needed.        .                 Medicines prescribed today were sent to:     [facility] [address] [place] [address] (S), [place] - [address] [email or web address]    [facility] (S) [place] [address]    Phone: [phone] Fax: [phone]    Open 24 Hours?: No      Medication refill instructions:       If your prescription bottle indicates you have medication refills left, it is not necessary to call your provider’s office. Please contact your pharmacy and they will refill your medication.    If your prescription bottle indicates you do not have any refills left, you may request refills at any time through one of the following ways: The online [employer] system (except Urgent Care), by calling your provider’s office, or by asking your pharmacy to contact your provider’s office with a refill request. Medication refills are processed only during regular business hours and may not be available until the next business day. Your provider may request additional information or to have a follow-up visit with you prior to refilling your medication.   *Please Note: Medication refills are assigned a new Rx  number when refilled electronically. Your pharmacy may indicate that no refills were authorized even though a new prescription for the same medication is available at the pharmacy. Please request the medicine by name with the pharmacy before contacting your provider for a refill.        Instructions    Sinusitis, Adult  Sinusitis is redness, soreness, and inflammation of the paranasal sinuses. Paranasal sinuses are air pockets within the bones of your face. They are located beneath your eyes, in the middle of your forehead, and above your eyes. In healthy paranasal sinuses, mucus is able to drain out, and air is able to circulate through them by way of your nose. However, when your paranasal sinuses are inflamed, mucus and air can become trapped. This can allow bacteria and other germs to grow and cause infection.  Sinusitis can develop quickly and last only a short time (acute) or continue over a long period (chronic). Sinusitis that lasts for more than 12 weeks is considered chronic.  CAUSES  Causes of sinusitis include:  · Allergies.  · Structural abnormalities, such as displacement of the cartilage that separates your nostrils (deviated septum), which can decrease the air flow through your nose and sinuses and affect sinus drainage.  · Functional abnormalities, such as when the small hairs (cilia) that line your sinuses and help remove mucus do not work properly or are not present.  SIGNS AND SYMPTOMS  Symptoms of acute and chronic sinusitis are the same. The primary symptoms are pain and pressure around the affected sinuses. Other symptoms include:  · Upper toothache.  · Earache.  · Headache.  · Bad breath.  · Decreased sense of smell and taste.  · A cough, which worsens when you are lying flat.  · Fatigue.  · Fever.  · Thick drainage from your nose, which often is green and may contain pus (purulent).  · Swelling and warmth over the affected sinuses.  DIAGNOSIS  Your health care provider will perform a  physical exam. During your exam, your health care provider may perform any of the following to help determine if you have acute sinusitis or chronic sinusitis:  · Look in your nose for signs of abnormal growths in your nostrils (nasal polyps).  · Tap over the affected sinus to check for signs of infection.  · View the inside of your sinuses using an imaging device that has a light attached (endoscope).  If your health care provider suspects that you have chronic sinusitis, one or more of the following tests may be recommended:  · Allergy tests.  · Nasal culture. A sample of mucus is taken from your nose, sent to a lab, and screened for bacteria.  · Nasal cytology. A sample of mucus is taken from your nose and examined by your health care provider to determine if your sinusitis is related to an allergy.  TREATMENT  Most cases of acute sinusitis are related to a viral infection and will resolve on their own within 10 days. Sometimes, medicines are prescribed to help relieve symptoms of both acute and chronic sinusitis. These may include pain medicines, decongestants, nasal steroid sprays, or saline sprays.  However, for sinusitis related to a bacterial infection, your health care provider will prescribe antibiotic medicines. These are medicines that will help kill the bacteria causing the infection.  Rarely, sinusitis is caused by a fungal infection. In these cases, your health care provider will prescribe antifungal medicine.  For some cases of chronic sinusitis, surgery is needed. Generally, these are cases in which sinusitis recurs more than 3 times per year, despite other treatments.  HOME CARE INSTRUCTIONS  · Drink plenty of water. Water helps thin the mucus so your sinuses can drain more easily.  · Use a humidifier.  · Inhale steam 3-4 times a day (for example, sit in the bathroom with the shower running).  · Apply a warm, moist washcloth to your face 3-4 times a day, or as directed by your health care  provider.  · Use saline nasal sprays to help moisten and clean your sinuses.  · Take medicines only as directed by your health care provider.  · If you were prescribed either an antibiotic or antifungal medicine, finish it all even if you start to feel better.  SEEK IMMEDIATE MEDICAL CARE IF:  · You have increasing pain or severe headaches.  · You have nausea, vomiting, or drowsiness.  · You have swelling around your face.  · You have vision problems.  · You have a stiff neck.  · You have difficulty breathing.     This information is not intended to replace advice given to you by your health care provider. Make sure you discuss any questions you have with your health care provider.     Document Released: 12/18/2006 Document Revised: 01/08/2016 Document Reviewed: 01/01/2013  [employer] Interactive Patient Education ©2016 [employer]            [employer] Access Code: Activation code not generated  Current HitMeUp Status: Active

## 2017-03-16 NOTE — Clinical Note
March 16, 2017       Patient: Karine Ovalle   YOB: 1965   Date of Visit: 3/16/2017         To Whom It May Concern:    It is my medical opinion that Karine Ovalle may be excused from work for the dates of 3/16/17-3/17/17.      If you have any questions or concerns, please don't hesitate to call [phone]          Sincerely,          Dejon Valentino PA-C  Electronically Signed

## 2017-03-17 NOTE — PROGRESS NOTES
Subjective:      Pt is a 51 y.o. female who presents with Sinus Problem            Sinus Problem  This is a new problem. The current episode started in the past 7 days. The problem has been gradually worsening since onset. There has been no fever. Her pain is at a severity of 7/10. The pain is moderate. Past treatments include oral decongestants. The treatment provided no relief.   PT presents to  clinic today complaining of sore throat, fevers, chills, watery eyes, pressure in ears, cough, fatigue, runny nose. PT denies CP, SOB, NVD, abdominal pain, joint pain. PT states these symptoms began around 2 days ago and that the pt's family has been sick on and off for the last week. Pt has not taken any medications for this condition. PT states the pain is a 7/10 with coughing fits, aching in nature and worse at night. The pt's medication list, problem list, and allergies have been evaluated and reviewed during today's visit. Pt also notes hx of rheumatoid arthritis and has chronic right hip pain and notes another recent fall and that she does not see her rheumatologist till later this week but states her right hip pain is 9/10.        PMH:  Past Medical History   Diagnosis Date   • Arthritis      right knee   • Dental disorder      partial upper   • Pain      left knee and arthritis pain       PSH:  Past Surgical History   Procedure Laterality Date   • Gastric bypass laparoscopic  2002   • Abdominal exploration     • Plastic surgery  2004     excess skin on arms and legs removed   • Debridement  5/17/2012     Performed by BRADLEY DYKES at [facility]   • Pr enlarge breast with implant  2004   • Appendectomy laparoscopic  3/21/2013     Performed by Dali Queen M.D. at [facility]   • Knee arthroplasty total Right 10/20/2015     Procedure: KNEE ARTHROPLASTY TOTAL;  Surgeon: Bryon Levine M.D.;  Location: [facility];  Service:        [facility] Hx:    family history includes  Diabetes in her mother; Heart Disease in her mother.  Family Status   Relation Status Death Age   • Mother     • Father         Soc HX:  Social History     Social History   • Marital Status: Single     Spouse Name: N/A   • Number of Children: N/A   • Years of Education: N/A     Occupational History   • Not on file.     Social History Main Topics   • Smoking status: Never Smoker    • Smokeless tobacco: Never Used   • Alcohol Use: 2.0 oz/week     4 Glasses of wine per week      Comment: 4 drinks a week   • Drug Use: No   • Sexual Activity: Not on file     Other Topics Concern   • Not on file     Social History Narrative    ** Merged History Encounter **              Medications:    Current outpatient prescriptions:   •  tramadol (ULTRAM) 50 MG Tab, Take 1 Tab by mouth every four hours as needed., Disp: 30 Tab, Rfl: 0  •  guaifenesin-codeine (TUSSI-ORGANIDIN NR) 100-10 MG/5ML syrup, Take 5 mL by mouth 3 times a day as needed for Cough for up to 5 days., Disp: 120 mL, Rfl: 0  •  amoxicillin-clavulanate (AUGMENTIN) 875-125 MG Tab, Take 1 Tab by mouth 2 times a day for 10 days., Disp: 20 Tab, Rfl: 0  •  phentermine 37.5 MG capsule, Take 1 Cap by mouth every morning., Disp: 90 Cap, Rfl: 1  •  Omega-3 Fatty Acids (FISH OIL) 1000 MG Cap capsule, Take 3,000 mg by mouth every day., Disp: , Rfl:   •  multivitamin (THERAGRAN) Tab, Take 1 Tab by mouth every day., Disp: , Rfl:   •  vitamin D (CHOLECALCIFEROL) 1000 UNIT Tab, Take 1,000 Units by mouth every day., Disp: , Rfl:   •  meloxicam (MOBIC) 7.5 MG Tab, Take 1 Tab by mouth every day., Disp: 30 Tab, Rfl: 0  •  gabapentin (NEURONTIN) 300 MG Cap, 1 tab po qhs, Disp: 30 Cap, Rfl: 2  •  olopatadine (PATANOL) 0.1 % ophthalmic solution, Place 1 Drop in both eyes 2 times a day. (Patient not taking: Reported on 2016), Disp: 5 mL, Rfl: 0  •  furosemide (LASIX) 20 MG Tab, TAKE ONE TABLET BY MOUTH ONCE DAILY, Disp: 90 Tab, Rfl: 4  •  potassium chloride ER (KLOR-CON)  "10 MEQ tablet, TAKE ONE TABLET BY MOUTH ONCE DAILY, Disp: 90 Tab, Rfl: 4  •  erythromycin 5 MG/GM Ointment, Apply 1 cm ribbon to inner lower eyelid QID x 5 days. (Patient not taking: Reported on 12/2/2016), Disp: 1 Tube, Rfl: 0      Allergies:  Review of patient's allergies indicates no known allergies.        ROS  Constitutional: Positive for chills and malaise/fatigue.   HENT: Positive for congestion and sore throat. POS for left ear pain.    Eyes: Negative for blurred vision, double vision and photophobia.   Respiratory: Positive for cough and sputum production. Negative for hemoptysis, shortness of breath and wheezing.    Cardiovascular: Negative for chest pain and palpitations.   Gastrointestinal: Negative for nausea, vomiting, abdominal pain, diarrhea and constipation.   Genitourinary: Negative for dysuria and flank pain.   Musculoskeletal: POS for falls and right hip myalgias.   Skin: Negative for itching and rash.   Neurological: Positive for headaches. Negative for dizziness and tingling.   Endo/Heme/Allergies: Does not bruise/bleed easily.   Psychiatric/Behavioral: Negative for depression. The patient is not nervous/anxious.             Objective:     /90 mmHg  Pulse 95  Temp(Src) 36.4 °C (97.5 °F)  Resp 16  Ht 1.6 m (5' 2.99\")  Wt 101.606 kg (224 lb)  BMI 39.69 kg/m2  SpO2 100%  LMP 01/02/2016     Physical Exam   Musculoskeletal:        Right hip: She exhibits decreased range of motion, decreased strength and tenderness. She exhibits no bony tenderness, no swelling, no crepitus, no deformity and no laceration.        Legs:        Constitutional: PT is oriented to person, place, and time. PT appears well-developed and well-nourished. No distress.   HENT:   Head: Normocephalic and atraumatic.   Right Ear: Hearing, tympanic membrane, external ear and ear canal normal.   Left Ear: Hearing, external ear and ear canal normal. Tympanic membrane is erythematous and bulging. A middle ear effusion is " present.   Nose: Mucosal edema, rhinorrhea and sinus tenderness present. Right sinus exhibits frontal sinus tenderness. Left sinus exhibits frontal sinus tenderness.   Mouth/Throat: Uvula is midline. Mucous membranes are pale. Posterior oropharyngeal edema and posterior oropharyngeal erythema present. No oropharyngeal exudate.   Eyes: Conjunctivae normal and EOM are normal. Pupils are equal, round, and reactive to light.   Neck: Normal range of motion. Neck supple. No thyromegaly present.   Cardiovascular: Normal rate, regular rhythm, normal heart sounds and intact distal pulses.  Exam reveals no gallop and no friction rub.    No murmur heard.  Pulmonary/Chest: Effort normal and breath sounds normal. No respiratory distress. PT has no wheezes. PT has no rales. PT exhibits no tenderness.   Abdominal: Soft. Bowel sounds are normal. PT exhibits no distension and no mass. There is no tenderness. There is no rebound and no guarding.   Lymphadenopathy:     PT has no cervical adenopathy.   Neurological: PT is alert and oriented to person, place, and time. PT displays normal reflexes. No cranial nerve deficit. PT exhibits normal muscle tone. Coordination normal.   Skin: Skin is warm and dry. No rash noted. No erythema.   Psychiatric: PT has a normal mood and affect. PT behavior is normal. Judgment and thought content normal.          Assessment/Plan:     1. Acute non-recurrent pansinusitis    - amoxicillin-clavulanate (AUGMENTIN) 875-125 MG Tab; Take 1 Tab by mouth 2 times a day for 10 days.  Dispense: 20 Tab; Refill: 0    2. Acute suppurative otitis media of left ear without spontaneous rupture of tympanic membrane, recurrence not specified    - amoxicillin-clavulanate (AUGMENTIN) 875-125 MG Tab; Take 1 Tab by mouth 2 times a day for 10 days.  Dispense: 20 Tab; Refill: 0    3. URI with cough and congestion    - guaifenesin-codeine (TUSSI-ORGANIDIN NR) 100-10 MG/5ML syrup; Take 5 mL by mouth 3 times a day as needed for  Cough for up to 5 days.  Dispense: 120 mL; Refill: 0    4. Chronic right hip pain    - tramadol (ULTRAM) 50 MG Tab; Take 1 Tab by mouth every four hours as needed.  Dispense: 30 Tab; Refill: 0    [place]  Aware web site evaluation: I have obtained and reviewed patient utilization report from [facility] pharmacy database prior to writing prescription for controlled substance II, III or IV per [place] bill . Based on the report and my clinical assessment the prescription is medically necessary.   NSAIDs for pain 1-5, Ultram for pain 6-10 or to help get to sleep.  Informed pt that  is not the place for pain meds and that this is something she needs to stay on top of with her PCP and rheumatologist.   Pt to follow up with her rheumatologist this coming week for continued pain meds  Rest, fluids encouraged.  OTC decongestant for congestion/cough  Note given for work.  AVS with medical info given.  Pt was in full understanding and agreement with the plan.  Follow-up as needed if symptoms worsen or fail to improve.

## 2017-03-17 NOTE — PATIENT INSTRUCTIONS
Sinusitis, Adult  Sinusitis is redness, soreness, and inflammation of the paranasal sinuses. Paranasal sinuses are air pockets within the bones of your face. They are located beneath your eyes, in the middle of your forehead, and above your eyes. In healthy paranasal sinuses, mucus is able to drain out, and air is able to circulate through them by way of your nose. However, when your paranasal sinuses are inflamed, mucus and air can become trapped. This can allow bacteria and other germs to grow and cause infection.  Sinusitis can develop quickly and last only a short time (acute) or continue over a long period (chronic). Sinusitis that lasts for more than 12 weeks is considered chronic.  CAUSES  Causes of sinusitis include:  · Allergies.  · Structural abnormalities, such as displacement of the cartilage that separates your nostrils (deviated septum), which can decrease the air flow through your nose and sinuses and affect sinus drainage.  · Functional abnormalities, such as when the small hairs (cilia) that line your sinuses and help remove mucus do not work properly or are not present.  SIGNS AND SYMPTOMS  Symptoms of acute and chronic sinusitis are the same. The primary symptoms are pain and pressure around the affected sinuses. Other symptoms include:  · Upper toothache.  · Earache.  · Headache.  · Bad breath.  · Decreased sense of smell and taste.  · A cough, which worsens when you are lying flat.  · Fatigue.  · Fever.  · Thick drainage from your nose, which often is green and may contain pus (purulent).  · Swelling and warmth over the affected sinuses.  DIAGNOSIS  Your health care provider will perform a physical exam. During your exam, your health care provider may perform any of the following to help determine if you have acute sinusitis or chronic sinusitis:  · Look in your nose for signs of abnormal growths in your nostrils (nasal polyps).  · Tap over the affected sinus to check for signs of  infection.  · View the inside of your sinuses using an imaging device that has a light attached (endoscope).  If your health care provider suspects that you have chronic sinusitis, one or more of the following tests may be recommended:  · Allergy tests.  · Nasal culture. A sample of mucus is taken from your nose, sent to a lab, and screened for bacteria.  · Nasal cytology. A sample of mucus is taken from your nose and examined by your health care provider to determine if your sinusitis is related to an allergy.  TREATMENT  Most cases of acute sinusitis are related to a viral infection and will resolve on their own within 10 days. Sometimes, medicines are prescribed to help relieve symptoms of both acute and chronic sinusitis. These may include pain medicines, decongestants, nasal steroid sprays, or saline sprays.  However, for sinusitis related to a bacterial infection, your health care provider will prescribe antibiotic medicines. These are medicines that will help kill the bacteria causing the infection.  Rarely, sinusitis is caused by a fungal infection. In these cases, your health care provider will prescribe antifungal medicine.  For some cases of chronic sinusitis, surgery is needed. Generally, these are cases in which sinusitis recurs more than 3 times per year, despite other treatments.  HOME CARE INSTRUCTIONS  · Drink plenty of water. Water helps thin the mucus so your sinuses can drain more easily.  · Use a humidifier.  · Inhale steam 3-4 times a day (for example, sit in the bathroom with the shower running).  · Apply a warm, moist washcloth to your face 3-4 times a day, or as directed by your health care provider.  · Use saline nasal sprays to help moisten and clean your sinuses.  · Take medicines only as directed by your health care provider.  · If you were prescribed either an antibiotic or antifungal medicine, finish it all even if you start to feel better.  SEEK IMMEDIATE MEDICAL CARE IF:  · You have  increasing pain or severe headaches.  · You have nausea, vomiting, or drowsiness.  · You have swelling around your face.  · You have vision problems.  · You have a stiff neck.  · You have difficulty breathing.     This information is not intended to replace advice given to you by your health care provider. Make sure you discuss any questions you have with your health care provider.     Document Released: 12/18/2006 Document Revised: 01/08/2016 Document Reviewed: 01/01/2013  [employer] Interactive Patient Education ©2016 [employer]

## 2017-03-28 ENCOUNTER — OFFICE VISIT (OUTPATIENT)
Dept: RHEUMATOLOGY | Facility: PHYSICIAN GROUP | Age: 52
End: 2017-03-28
Payer: COMMERCIAL

## 2017-03-28 VITALS
DIASTOLIC BLOOD PRESSURE: 80 MMHG | TEMPERATURE: 98.8 F | SYSTOLIC BLOOD PRESSURE: 130 MMHG | WEIGHT: 230 LBS | BODY MASS INDEX: 40.75 KG/M2 | OXYGEN SATURATION: 95 % | RESPIRATION RATE: 14 BRPM | HEART RATE: 101 BPM

## 2017-03-28 DIAGNOSIS — M15.9 PRIMARY OSTEOARTHRITIS INVOLVING MULTIPLE JOINTS: ICD-10-CM

## 2017-03-28 DIAGNOSIS — E66.9 OBESITY (BMI 30-39.9): ICD-10-CM

## 2017-03-28 DIAGNOSIS — M25.551 PAIN OF BOTH HIP JOINTS: ICD-10-CM

## 2017-03-28 DIAGNOSIS — R73.03 BORDERLINE TYPE 2 DIABETES MELLITUS: ICD-10-CM

## 2017-03-28 DIAGNOSIS — M54.50 CHRONIC MIDLINE LOW BACK PAIN WITHOUT SCIATICA: ICD-10-CM

## 2017-03-28 DIAGNOSIS — M17.11 PRIMARY OSTEOARTHRITIS OF RIGHT KNEE: ICD-10-CM

## 2017-03-28 DIAGNOSIS — M25.552 PAIN OF BOTH HIP JOINTS: ICD-10-CM

## 2017-03-28 DIAGNOSIS — Z96.651 STATUS POST RIGHT KNEE REPLACEMENT: ICD-10-CM

## 2017-03-28 DIAGNOSIS — G89.29 CHRONIC MIDLINE LOW BACK PAIN WITHOUT SCIATICA: ICD-10-CM

## 2017-03-28 PROCEDURE — 99213 OFFICE O/P EST LOW 20 MIN: CPT | Performed by: INTERNAL MEDICINE

## 2017-03-28 RX ORDER — GABAPENTIN 300 MG/1
CAPSULE ORAL
Qty: 180 CAP | Refills: 1 | Status: SHIPPED | OUTPATIENT
Start: 2017-03-28 | End: 2017-06-08

## 2017-03-28 RX ORDER — HYDROCODONE BITARTRATE AND ACETAMINOPHEN 5; 325 MG/1; MG/1
TABLET ORAL
Qty: 60 TAB | Refills: 0 | Status: SHIPPED | OUTPATIENT
Start: 2017-03-28 | End: 2017-06-08

## 2017-03-28 RX ORDER — MELOXICAM 15 MG/1
TABLET ORAL
Qty: 90 TAB | Refills: 1 | Status: SHIPPED | OUTPATIENT
Start: 2017-03-28 | End: 2017-11-16 | Stop reason: SDUPTHER

## 2017-03-28 NOTE — Clinical Note
[facility]   [address], [address]  [place] [address]  Phone: [phone]  Fax: [phone]              Encounter Date: 3/28/2017    Dear  No ref. provider found,    It was a pleasure seeing your patient, Karine Ovalle, on 3/28/2017. Diagnoses of Primary osteoarthritis involving multiple joints, Pain of both hip joints, Chronic midline low back pain without sciatica, Status post right knee replacement, Primary osteoarthritis of right knee- sp right TKR - dr nowak, Borderline type 2 diabetes mellitus, and Obesity (BMI 30-39.9) were pertinent to this visit.     Please find attached progress note which includes the history I obtained from Ms. Ovalle, my physical examination findings, my impression and recommendations.      Once again, it was a pleasure participating in your patient's care.  Please feel free to contact me if you have any questions or if I can be of any further assistance to your patients.      Sincerely,    Sandy Jenkins M.D.  Electronically Signed          PROGRESS NOTE:  No notes on file

## 2017-03-28 NOTE — MR AVS SNAPSHOT
Karine Ovalle   3/28/2017 3:00 PM   Office Visit   MRN: [id number]    Department:  Rheumatology [facility]   Dept Phone:  [phone]    Description:  Female : 1965   Provider:  Sandy Jenkins M.D.           Reason for Visit     Follow-Up           Allergies as of 3/28/2017     No Known Allergies      You were diagnosed with     Primary osteoarthritis involving multiple joints   [3263249]       Pain of both hip joints   [6286817]       Chronic midline low back pain without sciatica   [4972724]       Status post right knee replacement   [1214662]       Primary osteoarthritis of right knee   [349262]       Borderline type 2 diabetes mellitus   [4683274]       Obesity (BMI 30-39.9)   [624238]         Vital Signs     Blood Pressure Pulse Temperature Respirations Weight Oxygen Saturation    130/80 mmHg 101 37.1 °C (98.8 °F) 14 104.327 kg (230 lb) 95%    Last Menstrual Period Smoking Status                2016 Never Smoker           Basic Information     Date Of Birth Sex Race Ethnicity Preferred Language    1965 Female Black or  Non- English      Your appointments     2017  2:20 PM   Established Patient with Micky Rubin M.D.   [facility] [address] [facility]    [address] [facility] [address] [phone]           You will be receiving a confirmation call a few days before your appointment from our automated call confirmation system.            2017  3:00 PM   Follow Up Visit with Sandy Jenkins M.D.   [facility] (--)    [address]  [facility] [address] [phone]           You will be receiving a confirmation call a few days before your appointment from our automated call confirmation system.              Problem List              ICD-10-CM Priority Class Noted - Resolved    Mild intermittent asthma without complication J45.20 High  2012 - Present    Fibroids D25.9   2013  - Present    Primary osteoarthritis of right knee- sp right TKR - dr nowak M17.11   8/13/2013 - Present    Weight gain status post gastric bypass R63.5   8/12/2014 - Present    Vitamin B 12 deficiency E53.8   6/2/2016 - Present    Obesity (BMI 30-39.9) E66.9   10/21/2016 - Present    BMI 39.0-39.9,adult Z68.39   12/2/2016 - Present    Throat irritation J39.2   12/2/2016 - Present      Health Maintenance        Date Due Completion Dates    IMM PNEUMOCOCCAL 19-64 (ADULT) MEDIUM RISK SERIES (1 of 1 - PPSV23) 5/17/1984 ---    COLONOSCOPY 5/17/2015 ---    PAP SMEAR 7/9/2016 7/9/2013, 10/14/2011, 8/10/2010    IMM INFLUENZA (1) 9/1/2016 ---    MAMMOGRAM 6/30/2018 6/30/2016 (Done), 8/19/2014, 7/26/2013, 7/6/2012, 8/20/2010, 8/20/2010, 9/30/2008, 9/30/2008, 10/25/2007, 10/25/2007    Override on 6/30/2016: Done    IMM DTaP/Tdap/Td Vaccine (2 - Td) 5/7/2022 5/7/2012            Current Immunizations     Tdap Vaccine 5/7/2012      Below and/or attached are the medications your provider expects you to take. Review all of your home medications and newly ordered medications with your provider and/or pharmacist. Follow medication instructions as directed by your provider and/or pharmacist. Please keep your medication list with you and share with your provider. Update the information when medications are discontinued, doses are changed, or new medications (including over-the-counter products) are added; and carry medication information at all times in the event of emergency situations     Allergies:  No Known Allergies          Medications  Valid as of: March 28, 2017 -  3:53 PM    Generic Name Brand Name Tablet Size Instructions for use    Cholecalciferol (Tab) cholecalciferol 1000 UNIT Take 1,000 Units by mouth every day.        Erythromycin (Ointment) erythromycin 5 MG/GM Apply 1 cm ribbon to inner lower eyelid QID x 5 days.        Furosemide (Tab) LASIX 20 MG TAKE ONE TABLET BY MOUTH ONCE DAILY        Gabapentin (Cap) NEURONTIN  300 MG 2 tab po qhs        Hydrocodone-Acetaminophen (Tab) NORCO 5-325 MG 1 tab po bid prn SEVERE pain, NO DRIVING and NO ALCOHOL within 24 hrs of taking this medication, NO benzodiazepine medications with this medication, no selling or giving to any other persons        Meloxicam (Tab) MOBIC 15 MG 1 tab po qday with food for djd        Multiple Vitamin (Tab) THERAGRAN  Take 1 Tab by mouth every day.        Olopatadine HCl (Solution) PATANOL 0.1 % Place 1 Drop in both eyes 2 times a day.        Omega-3 Fatty Acids (Cap) fish oil 1000 MG Take 3,000 mg by mouth every day.        Phentermine HCl (Cap) phentermine 37.5 MG Take 1 Cap by mouth every morning.        Potassium Chloride (Tab CR) KLOR-CON 10 MEQ TAKE ONE TABLET BY MOUTH ONCE DAILY        .                 Medicines prescribed today were sent to:     [facility] [address] [place] [address] (S), [place] [address]    [facility] [place] [address]    Phone: [phone] Fax: [phone]    Open 24 Hours?: No      Medication refill instructions:       If your prescription bottle indicates you have medication refills left, it is not necessary to call your provider’s office. Please contact your pharmacy and they will refill your medication.    If your prescription bottle indicates you do not have any refills left, you may request refills at any time through one of the following ways: The online [employer] system (except Urgent Care), by calling your provider’s office, or by asking your pharmacy to contact your provider’s office with a refill request. Medication refills are processed only during regular business hours and may not be available until the next business day. Your provider may request additional information or to have a follow-up visit with you prior to refilling your medication.   *Please Note: Medication refills are assigned a new Rx number when refilled electronically. Your pharmacy may indicate that no refills were authorized even though a new  prescription for the same medication is available at the pharmacy. Please request the medicine by name with the pharmacy before contacting your provider for a refill.        Your To Do List     Future Labs/Procedures Complete By Expires    DX-HIP-UNILATERAL-W/O PELVIS-2/3 VIEWS RIGHT  As directed 3/28/2018    HEMOGLOBIN A1C  As directed 3/29/2018         [employer] Access Code: Activation code not generated  Current [employer] 2000 Status: Active

## 2017-03-29 NOTE — PROGRESS NOTES
Chief Complaint- joint pain    Subjective:   Karine Ovalle is a 51 y.o. female here today for follow up of rheumatological issues    This is a follow-up patient to this clinic, new patient to me, previously followed by Dr Dawson for osteoarthritis predominantly of the knees, status post physical therapy, patient also with issues with weight, previous bypass surgery, patient here to see what can be done about her knee pain status post right TKA 2015 also complains of right hip pain. At last visit we started patient on meloxicam and tramadol, patient is here today in tears because of pain and difficulty getting around stating that she can't even clean her house and brings pictures of how dirty her houses. Patient wonders if there is other option so that she can ambulate better. Patient reluctant to do any kind of surgery, is status post a right TKA with some benefit. Patient states she's been to physical therapy for her back and knees and hip many times, owes money to PT, can no longer go back to PT until she pays her bills.  Patient did not follow through with pain clinic referral from last visit.      Right TKA     Left knee x-ray 2/2017-indicates tricompartmental DJD  LS spine x-ray 1/2015-indicates DJD/DDD  Bilateral hip x-rays 1/2015-indicates DJD    Current medicines (including changes today)  Current Outpatient Prescriptions   Medication Sig Dispense Refill   • hydrocodone-acetaminophen (NORCO) 5-325 MG Tab per tablet 1 tab po bid prn SEVERE pain, NO DRIVING and NO ALCOHOL within 24 hrs of taking this medication, NO benzodiazepine medications with this medication, no selling or giving to any other persons 60 Tab 0   • meloxicam (MOBIC) 15 MG tablet 1 tab po qday with food for djd 90 Tab 1   • gabapentin (NEURONTIN) 300 MG Cap 2 tab po qhs 180 Cap 1   • phentermine 37.5 MG capsule Take 1 Cap by mouth every morning. 90 Cap 1   • Omega-3 Fatty Acids (FISH OIL) 1000 MG Cap capsule Take 3,000 mg by mouth every  day.     • multivitamin (THERAGRAN) Tab Take 1 Tab by mouth every day.     • vitamin D (CHOLECALCIFEROL) 1000 UNIT Tab Take 1,000 Units by mouth every day.     • olopatadine (PATANOL) 0.1 % ophthalmic solution Place 1 Drop in both eyes 2 times a day. (Patient not taking: Reported on 12/2/2016) 5 mL 0   • furosemide (LASIX) 20 MG Tab TAKE ONE TABLET BY MOUTH ONCE DAILY 90 Tab 4   • potassium chloride ER (KLOR-CON) 10 MEQ tablet TAKE ONE TABLET BY MOUTH ONCE DAILY 90 Tab 4   • erythromycin 5 MG/GM Ointment Apply 1 cm ribbon to inner lower eyelid QID x 5 days. (Patient not taking: Reported on 12/2/2016) 1 Tube 0     No current facility-administered medications for this visit.     She  has a past medical history of Arthritis; Dental disorder; and Pain. She also has no past medical history of ASTHMA, Diabetes, or Breast cancer (CMS-[facility]).    ROS   Other than what is mentioned in HPI or physical exam, there is no history of headaches, double vision or blurred vision. No temporal tenderness or jaw claudication. No history of cataracts or glaucoma. No trouble swallowing difficulties or sore throats. No history of thyroid disease. No chest complaints including chest pain, cough or sputum production. No shortness of breath. No GI complaints including nausea, vomiting, change in bowel habits, or past peptic ulcer disease. No history of blood in the stools. No urinary complaints including dysuria or frequency. No history of rash including psoriasis. No history of alopecia, photosensitivity, oral ulcerations, Raynaud's phenomena, or swollen joints. No history of gout. No back complaints. No history of low blood counts.       Objective:     Blood pressure 130/80, pulse 101, temperature 37.1 °C (98.8 °F), resp. rate 14, weight 104.327 kg (230 lb), last menstrual period 01/02/2016, SpO2 95 %. Body mass index is 40.75 kg/(m^2).   Physical Exam:  GENERAL:  Alert and oriented, tearful. Patient is quite heavy SKIN:  There is no sign of  infection induration or other problem, no vasculitic lesions, no malar rashes, no discoid lesions. HEENT:  Unremarkable. PERRLA, extraocular movements are intact. Fundi not examined. Temporal arteries are palpable and non-tender. THROAT:  Clear.  NECK:  Supple with fairly good range of motion, including flexion, extension, lateral rotation and bending. No JVD, thyromegaly, or  adenopathy is appreciated. Carotids are palpable, no bruits. CHEST:  Clear to auscultation and percussion bilaterally no rales or rhonchi.  BREASTS:  Not examined. COR:  Regular rate and rhythm. No murmurs, rubs or gallops.  ABDOMEN: Soft non tender, non distended. Normal active bowel sounds. No organomegaly appreciated.  EXTREMITIES: Bony hypertrophy bilateral knees but no effusions, no flexure contractures and elbows No clubbing, cyanosis, edema.  MUSCULOSKELETAL: The joints have good range of motion including shoulders, elbows, wrists and small joints the hands. There is no tophi or nodules appreciated. Low back is unremarkable. Patient is severely flat-footed with significant pes planus and inversion of ankles resulting in valgus deformity of bilateral lower extremities and most likely affecting hips and back as well. RECTAL: Not done.  :  Not done.  NEUROLOGICAL:  Grossly intact.  PULSES:  Intact  Lab Results   Component Value Date/Time    SODIUM 138 05/03/2016 07:32 AM    POTASSIUM 3.6 05/03/2016 07:32 AM    CHLORIDE 108 05/03/2016 07:32 AM    CO2 22 05/03/2016 07:32 AM    GLUCOSE 88 05/03/2016 07:32 AM    BUN 11 05/03/2016 07:32 AM    CREATININE 0.63 05/03/2016 07:32 AM    BUN-CREATININE RATIO 13 08/14/2010 12:00 AM    GLOM FILT RATE, EST >59 08/14/2010 12:00 AM      Lab Results   Component Value Date/Time    WBC 7.2 05/03/2016 07:32 AM    RBC 4.51 05/03/2016 07:32 AM    HEMOGLOBIN 13.2 05/03/2016 07:32 AM    HEMATOCRIT 38.8 05/03/2016 07:32 AM    MCV 86.0 05/03/2016 07:32 AM    MCH 29.3 05/03/2016 07:32 AM    MCHC 34.0 05/03/2016  07:32 AM    MPV 10.3 05/03/2016 07:32 AM    NEUTROPHILS-POLYS 54.80 05/03/2016 07:32 AM    LYMPHOCYTES 31.80 05/03/2016 07:32 AM    MONOCYTES 6.70 05/03/2016 07:32 AM    EOSINOPHILS 5.70 05/03/2016 07:32 AM    BASOPHILS 0.70 05/03/2016 07:32 AM    HYPOCHROMIA 1+ 05/20/2012 05:22 AM      Lab Results   Component Value Date/Time    CALCIUM 9.1 05/03/2016 07:32 AM    AST(SGOT) 31 05/03/2016 07:32 AM    ALT(SGPT) 18 05/03/2016 07:32 AM    ALKALINE PHOSPHATASE 86 05/03/2016 07:32 AM    TOTAL BILIRUBIN 0.5 05/03/2016 07:32 AM    ALBUMIN 3.7 05/03/2016 07:32 AM    TOTAL PROTEIN 7.1 05/03/2016 07:32 AM     Lab Results   Component Value Date/Time    URIC ACID 5.6 08/12/2014 04:49 PM    RHEUMATOID FACTOR -NEPH- <10 08/12/2014 04:49 PM    CCP ANTIBODIES 8 08/12/2014 04:49 PM     Lab Results   Component Value Date/Time    COLOR Yellow 10/07/2015 03:40 PM    SPECIFIC GRAVITY 1.020 10/20/2015 10:50 AM    PH 7.5 10/07/2015 03:40 PM    GLUCOSE Negative 10/07/2015 03:40 PM    KETONES Negative 10/07/2015 03:40 PM    PROTEIN Negative 10/07/2015 03:40 PM     Lab Results   Component Value Date/Time    SED RATE WESTERGREN 7 08/12/2014 04:49 PM     Results for orders placed in visit on 05/26/16   DX-KNEES-AP BILATERAL STANDING    Impression 1.  No evidence of fracture or dislocation.    2.  Right knee arthroplasty is again noted.       Results for orders placed in visit on 01/02/15   DX-PELVIS-1 OR 2 VIEWS    Impression 1.  There are degenerative changes in both hips, right left.  2.  There is no acute fracture or dislocation.     Results for orders placed in visit on 05/26/16   DX-KNEES-AP BILATERAL STANDING    Impression 1.  No evidence of fracture or dislocation.    2.  Right knee arthroplasty is again noted.       Results for orders placed in visit on 01/02/15   DX-PELVIS-1 OR 2 VIEWS    Impression 1.  There are degenerative changes in both hips, right left.  2.  There is no acute fracture or dislocation.     Results for orders placed  during the hospital encounter of 12/03/15   DX-KNEE COMPLETE 4+ RIGHT    Impression Total knee arthroplasty without periprosthetic fracture identified     Results for orders placed during the hospital encounter of 10/20/15   DX-KNEE 2- RIGHT    Impression Status post RIGHT knee arthroplasty       Results for orders placed in visit on 02/11/13   MR-KNEE-W/O    Impression 1.  Complex degenerative tear in the posterior horn and body of the medial meniscus with a loculated parameniscal cyst.    2. Tear in the body of the lateral meniscus with a tiny parameniscal cyst.    3. Severe osteoarthritis, most pronounced in the medial compartment.    4. Chronically torn anterior cruciate ligament    5. Small joint effusion.    6. Moderate, loculated Baker's cyst containing at least one intra-articular body       Assessment and Plan:     1. Primary osteoarthritis involving multiple joints  Currently on meloxicam, we have to be careful regarding NSAIDs because patient has had a gastric bypass, we will stop the tramadol and do a trial of Norco 5/325 mg twice a day,  - hydrocodone-acetaminophen (NORCO) 5-325 MG Tab per tablet; 1 tab po bid prn SEVERE pain, NO DRIVING and NO ALCOHOL within 24 hrs of taking this medication, NO benzodiazepine medications with this medication, no selling or giving to any other persons  Dispense: 60 Tab; Refill: 0  - REFERRAL TO PAIN MANAGEMENT  - meloxicam (MOBIC) 15 MG tablet; 1 tab po qday with food for djd  Dispense: 90 Tab; Refill: 1  - DX-HIP-UNILATERAL-W/O PELVIS-2/3 VIEWS RIGHT; Future  - gabapentin (NEURONTIN) 300 MG Cap; 2 tab po qhs  Dispense: 180 Cap; Refill: 1    2. Pain of both hip joints  X-rays indicating DJD, if Norco and meloxicam not enough that we may pursue bilateral hip cortisone injections  - hydrocodone-acetaminophen (NORCO) 5-325 MG Tab per tablet; 1 tab po bid prn SEVERE pain, NO DRIVING and NO ALCOHOL within 24 hrs of taking this medication, NO benzodiazepine medications with  this medication, no selling or giving to any other persons  Dispense: 60 Tab; Refill: 0  - REFERRAL TO PAIN MANAGEMENT  - meloxicam (MOBIC) 15 MG tablet; 1 tab po qday with food for djd  Dispense: 90 Tab; Refill: 1  - DX-HIP-UNILATERAL-W/O PELVIS-2/3 VIEWS RIGHT; Future  - gabapentin (NEURONTIN) 300 MG Cap; 2 tab po qhs  Dispense: 180 Cap; Refill: 1    3. Chronic midline low back pain without sciatica  We'll refer to pain clinic/spine clinic for possible epidurals  - hydrocodone-acetaminophen (NORCO) 5-325 MG Tab per tablet; 1 tab po bid prn SEVERE pain, NO DRIVING and NO ALCOHOL within 24 hrs of taking this medication, NO benzodiazepine medications with this medication, no selling or giving to any other persons  Dispense: 60 Tab; Refill: 0  - REFERRAL TO PAIN MANAGEMENT  - meloxicam (MOBIC) 15 MG tablet; 1 tab po qday with food for djd  Dispense: 90 Tab; Refill: 1  - DX-HIP-UNILATERAL-W/O PELVIS-2/3 VIEWS RIGHT; Future  - gabapentin (NEURONTIN) 300 MG Cap; 2 tab po qhs  Dispense: 180 Cap; Refill: 1    4. Status post right knee replacement  Stable  - hydrocodone-acetaminophen (NORCO) 5-325 MG Tab per tablet; 1 tab po bid prn SEVERE pain, NO DRIVING and NO ALCOHOL within 24 hrs of taking this medication, NO benzodiazepine medications with this medication, no selling or giving to any other persons  Dispense: 60 Tab; Refill: 0  - meloxicam (MOBIC) 15 MG tablet; 1 tab po qday with food for djd  Dispense: 90 Tab; Refill: 1  - DX-HIP-UNILATERAL-W/O PELVIS-2/3 VIEWS RIGHT; Future  - gabapentin (NEURONTIN) 300 MG Cap; 2 tab po qhs  Dispense: 180 Cap; Refill: 1    5. Primary osteoarthritis of right knee- sp right TKR - dr nowak  - gabapentin (NEURONTIN) 300 MG Cap; 2 tab po qhs  Dispense: 180 Cap; Refill: 1    6. Borderline type 2 diabetes mellitus  Patient's been told he is borderline diabetic in the past, today we'll check a hemoglobin A1c to evaluate status  - HEMOGLOBIN A1C; Future    7. Obesity (BMI 30-39.9)  Patient  very distressed about being overweight, status post gastric bypass with two-year success but then gained weight back  Exacerbates the patient's arthritis, recommend to lose weight, patient states understanding  - hydrocodone-acetaminophen (NORCO) 5-325 MG Tab per tablet; 1 tab po bid prn SEVERE pain, NO DRIVING and NO ALCOHOL within 24 hrs of taking this medication, NO benzodiazepine medications with this medication, no selling or giving to any other persons  Dispense: 60 Tab; Refill: 0  - meloxicam (MOBIC) 15 MG tablet; 1 tab po qday with food for djd  Dispense: 90 Tab; Refill: 1  - DX-HIP-UNILATERAL-W/O PELVIS-2/3 VIEWS RIGHT; Future  - gabapentin (NEURONTIN) 300 MG Cap; 2 tab po qhs  Dispense: 180 Cap; Refill: 1    Followup: Return in about 2 months (around 5/28/2017). or sooner prn    Patient was seen 30 minutes face-to-face of which more than 50% of the time was spent counseling the patient (excluding time for procedures)  regarding  rheumatological condition and care. Therapy was discussed in detail.    Please note that this dictation was created using voice recognition software. I have made every reasonable attempt to correct obvious errors, but I expect that there are errors of grammar and possibly content that I did not discover before finalizing the note.

## 2017-04-08 ENCOUNTER — APPOINTMENT (OUTPATIENT)
Dept: RADIOLOGY | Facility: IMAGING CENTER | Age: 52
End: 2017-04-08
Attending: PHYSICIAN ASSISTANT
Payer: COMMERCIAL

## 2017-04-08 ENCOUNTER — OFFICE VISIT (OUTPATIENT)
Dept: URGENT CARE | Facility: CLINIC | Age: 52
End: 2017-04-08
Payer: COMMERCIAL

## 2017-04-08 VITALS
BODY MASS INDEX: 40.75 KG/M2 | WEIGHT: 230 LBS | HEART RATE: 84 BPM | TEMPERATURE: 97 F | DIASTOLIC BLOOD PRESSURE: 90 MMHG | RESPIRATION RATE: 18 BRPM | SYSTOLIC BLOOD PRESSURE: 136 MMHG | HEIGHT: 63 IN | OXYGEN SATURATION: 95 %

## 2017-04-08 DIAGNOSIS — M25.551 PAIN OF RIGHT HIP JOINT: ICD-10-CM

## 2017-04-08 PROCEDURE — 99214 OFFICE O/P EST MOD 30 MIN: CPT | Performed by: PHYSICIAN ASSISTANT

## 2017-04-08 RX ORDER — LIDOCAINE HYDROCHLORIDE 30 MG/G
CREAM TOPICAL
Qty: 1 TUBE | Refills: 0 | Status: SHIPPED | OUTPATIENT
Start: 2017-04-08 | End: 2018-04-26

## 2017-04-13 ASSESSMENT — ENCOUNTER SYMPTOMS
ABDOMINAL PAIN: 0
NUMBNESS: 0
VOMITING: 0
FEVER: 0
DIZZINESS: 0
TINGLING: 0
CHILLS: 0
HEADACHES: 0
NAUSEA: 0
DIARRHEA: 0
SHORTNESS OF BREATH: 0

## 2017-04-13 NOTE — PROGRESS NOTES
"Subjective:      Karine Ovalle is a 51 y.o. female who presents with Fall            Fall  The accident occurred 12 to 24 hours ago. The fall occurred while standing. She fell from a height of 1 to 2 ft. She landed on hard floor. There was no blood loss. The point of impact was the right hip. The pain is present in the right hip. The pain is at a severity of 7/10. The pain is moderate. The symptoms are aggravated by ambulation, standing and movement. Pertinent negatives include no abdominal pain, fever, headaches, nausea, numbness, tingling or vomiting. She has tried NSAID for the symptoms. The treatment provided mild relief.     Patient presents to urgent care reporting right hip pain that started after a fall last night. She saw her chiropractor today and was told it didn't look like there was any acute fracture, but she did not have any x-rays performed.     Review of Systems   Constitutional: Negative for fever and chills.   Respiratory: Negative for shortness of breath.    Cardiovascular: Negative for chest pain.   Gastrointestinal: Negative for nausea, vomiting, abdominal pain and diarrhea.   Genitourinary: Negative.    Musculoskeletal:        Positive for hip pain   Neurological: Negative for dizziness, tingling, numbness and headaches.          Objective:     /90 mmHg  Pulse 84  Temp(Src) 36.1 °C (97 °F)  Resp 18  Ht 1.6 m (5' 3\")  Wt 104.327 kg (230 lb)  BMI 40.75 kg/m2  SpO2 95%  LMP 01/02/2016  Breastfeeding? No     Physical Exam   Constitutional: She is oriented to person, place, and time. She appears well-developed and well-nourished. No distress.   HENT:   Head: Normocephalic and atraumatic.   Eyes: Pupils are equal, round, and reactive to light.   Cardiovascular: Normal rate.    Pulmonary/Chest: Effort normal.   Musculoskeletal:        Right hip: She exhibits decreased range of motion and tenderness. She exhibits no swelling and no deformity.        Legs:  Some tenderness present " over lateral right hip. No obvious ecchymosis, swelling, or deformity noted.    Neurological: She is alert and oriented to person, place, and time.   Skin: Skin is warm and dry. She is not diaphoretic.   Psychiatric: She has a normal mood and affect. Her behavior is normal.   Nursing note and vitals reviewed.         PMH:  has a past medical history of Arthritis; Dental disorder; and Pain. She also has no past medical history of ASTHMA, Diabetes, or Breast cancer (CMS-[facility]).  MEDS:   Current outpatient prescriptions:   •  Lidocaine HCl 3 % Cream, Apply cream to affected area 2 times daily as needed, Disp: 1 Tube, Rfl: 0  •  hydrocodone-acetaminophen (NORCO) 5-325 MG Tab per tablet, 1 tab po bid prn SEVERE pain, NO DRIVING and NO ALCOHOL within 24 hrs of taking this medication, NO benzodiazepine medications with this medication, no selling or giving to any other persons, Disp: 60 Tab, Rfl: 0  •  meloxicam (MOBIC) 15 MG tablet, 1 tab po qday with food for djd, Disp: 90 Tab, Rfl: 1  •  gabapentin (NEURONTIN) 300 MG Cap, 2 tab po qhs, Disp: 180 Cap, Rfl: 1  •  phentermine 37.5 MG capsule, Take 1 Cap by mouth every morning., Disp: 90 Cap, Rfl: 1  •  olopatadine (PATANOL) 0.1 % ophthalmic solution, Place 1 Drop in both eyes 2 times a day. (Patient not taking: Reported on 12/2/2016), Disp: 5 mL, Rfl: 0  •  furosemide (LASIX) 20 MG Tab, TAKE ONE TABLET BY MOUTH ONCE DAILY, Disp: 90 Tab, Rfl: 4  •  potassium chloride ER (KLOR-CON) 10 MEQ tablet, TAKE ONE TABLET BY MOUTH ONCE DAILY, Disp: 90 Tab, Rfl: 4  •  erythromycin 5 MG/GM Ointment, Apply 1 cm ribbon to inner lower eyelid QID x 5 days. (Patient not taking: Reported on 12/2/2016), Disp: 1 Tube, Rfl: 0  •  Omega-3 Fatty Acids (FISH OIL) 1000 MG Cap capsule, Take 3,000 mg by mouth every day., Disp: , Rfl:   •  multivitamin (THERAGRAN) Tab, Take 1 Tab by mouth every day., Disp: , Rfl:   •  vitamin D (CHOLECALCIFEROL) 1000 UNIT Tab, Take 1,000 Units by mouth every day., Disp:  , Rfl:   ALLERGIES: No Known Allergies  SURGHX:   Past Surgical History   Procedure Laterality Date   • Gastric bypass laparoscopic  2002   • Abdominal exploration     • Plastic surgery  2004     excess skin on arms and legs removed   • Debridement  5/17/2012     Performed by BRADLEY DYKES at SURGERY [facility]   • Pr enlarge breast with implant  2004   • Appendectomy laparoscopic  3/21/2013     Performed by Dali Queen M.D. at SURGERY [facility]   • Knee arthroplasty total Right 10/20/2015     Procedure: KNEE ARTHROPLASTY TOTAL;  Surgeon: Bryon Levine M.D.;  Location: SURGERY [facility];  Service:      SOCHX:  reports that she has never smoked. She has never used smokeless tobacco. She reports that she drinks about 2.0 oz of alcohol per week. She reports that she does not use illicit drugs.  FH: family history includes Diabetes in her mother; Heart Disease in her mother.       Assessment/Plan:     1. Pain of right hip joint  - DX-HIP-COMPLETE - UNILATERAL 2+ RIGHT; Future  - Lidocaine HCl 3 % Cream; Apply cream to affected area 2 times daily as needed  Dispense: 1 Tube; Refill: 0    Low suspicion for any acute fracture or dislocation, will send for x-rays to confirm. Patient does not want to have x-rays performed in urgent care and is requesting to go to the [facility] hospital for the x-ray due to insurance coverage. She states she will go to the [facility] hospital tonight. Advised to ice the hip multiple times daily, OTC ibuprofen as needed, and use of lidocaine patch as needed. Call or return to office if symptoms persist or worsen

## 2017-04-24 ENCOUNTER — HOSPITAL ENCOUNTER (OUTPATIENT)
Dept: RADIOLOGY | Facility: MEDICAL CENTER | Age: 52
End: 2017-04-24
Attending: PHYSICIAN ASSISTANT
Payer: COMMERCIAL

## 2017-04-24 PROCEDURE — 73502 X-RAY EXAM HIP UNI 2-3 VIEWS: CPT | Mod: RT

## 2017-04-25 ENCOUNTER — TELEPHONE (OUTPATIENT)
Dept: URGENT CARE | Facility: CLINIC | Age: 52
End: 2017-04-25

## 2017-04-25 NOTE — TELEPHONE ENCOUNTER
Left a message for patient advising her that her hip x-ray was negative for fracture. Advised to call me at [facility] office today with any questions.

## 2017-05-12 RX ORDER — OLOPATADINE HYDROCHLORIDE 1 MG/ML
SOLUTION/ DROPS OPHTHALMIC
Refills: 0 | OUTPATIENT
Start: 2017-05-12

## 2017-05-12 NOTE — TELEPHONE ENCOUNTER
Called [facility] 05/12/17 AND ASKED FOR THIS REQUEST TO BE RE-ROUTED TO CORRECT PCP OFFICE.

## 2017-05-15 DIAGNOSIS — H10.13 ALLERGIC CONJUNCTIVITIS OF BOTH EYES: ICD-10-CM

## 2017-05-15 RX ORDER — OLOPATADINE HYDROCHLORIDE 1 MG/ML
1 SOLUTION/ DROPS OPHTHALMIC 2 TIMES DAILY
Qty: 5 ML | Refills: 0 | OUTPATIENT
Start: 2017-05-15

## 2017-06-08 ENCOUNTER — OFFICE VISIT (OUTPATIENT)
Dept: MEDICAL GROUP | Age: 52
End: 2017-06-08
Payer: COMMERCIAL

## 2017-06-08 VITALS
SYSTOLIC BLOOD PRESSURE: 106 MMHG | BODY MASS INDEX: 41.64 KG/M2 | HEART RATE: 89 BPM | HEIGHT: 63 IN | OXYGEN SATURATION: 97 % | WEIGHT: 235 LBS | TEMPERATURE: 97.7 F | DIASTOLIC BLOOD PRESSURE: 76 MMHG

## 2017-06-08 DIAGNOSIS — E66.01 SEVERE OBESITY (BMI >= 40) (HCC): ICD-10-CM

## 2017-06-08 DIAGNOSIS — E78.5 HYPERLIPIDEMIA, UNSPECIFIED HYPERLIPIDEMIA TYPE: ICD-10-CM

## 2017-06-08 DIAGNOSIS — Z79.891 CHRONIC USE OF OPIATE FOR THERAPEUTIC PURPOSE: ICD-10-CM

## 2017-06-08 DIAGNOSIS — E53.8 VITAMIN B 12 DEFICIENCY: ICD-10-CM

## 2017-06-08 DIAGNOSIS — M25.551 PAIN OF BOTH HIP JOINTS: ICD-10-CM

## 2017-06-08 DIAGNOSIS — Z96.651 STATUS POST RIGHT KNEE REPLACEMENT: ICD-10-CM

## 2017-06-08 DIAGNOSIS — G89.29 CHRONIC MIDLINE LOW BACK PAIN WITHOUT SCIATICA: ICD-10-CM

## 2017-06-08 DIAGNOSIS — M79.89 PAIN AND SWELLING OF RIGHT LOWER LEG: ICD-10-CM

## 2017-06-08 DIAGNOSIS — E61.1 IRON DEFICIENCY: ICD-10-CM

## 2017-06-08 DIAGNOSIS — G89.4 CHRONIC PAIN SYNDROME: ICD-10-CM

## 2017-06-08 DIAGNOSIS — M25.552 PAIN OF BOTH HIP JOINTS: ICD-10-CM

## 2017-06-08 DIAGNOSIS — E66.9 OBESITY (BMI 30-39.9): ICD-10-CM

## 2017-06-08 DIAGNOSIS — Z12.11 SCREEN FOR COLON CANCER: ICD-10-CM

## 2017-06-08 DIAGNOSIS — J45.20 MILD INTERMITTENT ASTHMA WITHOUT COMPLICATION: ICD-10-CM

## 2017-06-08 DIAGNOSIS — D25.9 UTERINE LEIOMYOMA, UNSPECIFIED LOCATION: ICD-10-CM

## 2017-06-08 DIAGNOSIS — M15.9 PRIMARY OSTEOARTHRITIS INVOLVING MULTIPLE JOINTS: ICD-10-CM

## 2017-06-08 DIAGNOSIS — M54.50 CHRONIC MIDLINE LOW BACK PAIN WITHOUT SCIATICA: ICD-10-CM

## 2017-06-08 DIAGNOSIS — M79.661 PAIN AND SWELLING OF RIGHT LOWER LEG: ICD-10-CM

## 2017-06-08 DIAGNOSIS — M62.830 MUSCLE SPASM OF BACK: ICD-10-CM

## 2017-06-08 DIAGNOSIS — Z12.31 ENCOUNTER FOR SCREENING MAMMOGRAM FOR BREAST CANCER: ICD-10-CM

## 2017-06-08 DIAGNOSIS — M17.11 PRIMARY OSTEOARTHRITIS OF RIGHT KNEE: ICD-10-CM

## 2017-06-08 DIAGNOSIS — N95.0 POST-MENOPAUSAL BLEEDING: ICD-10-CM

## 2017-06-08 PROCEDURE — 99396 PREV VISIT EST AGE 40-64: CPT | Performed by: INTERNAL MEDICINE

## 2017-06-08 RX ORDER — TIZANIDINE 4 MG/1
4 TABLET ORAL EVERY 6 HOURS PRN
Qty: 30 TAB | Refills: 3 | Status: SHIPPED | DISCHARGE
Start: 2017-06-08 | End: 2017-06-08

## 2017-06-08 RX ORDER — PHENTERMINE HYDROCHLORIDE 37.5 MG/1
37.5 CAPSULE ORAL EVERY MORNING
Qty: 90 CAP | Refills: 0 | Status: SHIPPED | OUTPATIENT
Start: 2017-06-08 | End: 2017-09-12 | Stop reason: SDUPTHER

## 2017-06-08 RX ORDER — OXYCODONE HYDROCHLORIDE 5 MG/1
5 TABLET ORAL EVERY 8 HOURS PRN
Qty: 90 TAB | Refills: 0 | Status: SHIPPED | DISCHARGE
Start: 2017-06-08 | End: 2018-01-25

## 2017-06-08 RX ORDER — TIZANIDINE 4 MG/1
4 TABLET ORAL EVERY 6 HOURS PRN
Qty: 30 TAB | Refills: 3 | Status: SHIPPED | DISCHARGE
Start: 2017-06-08 | End: 2017-06-08 | Stop reason: SDUPTHER

## 2017-06-08 ASSESSMENT — ENCOUNTER SYMPTOMS
PSYCHIATRIC NEGATIVE: 1
CONSTITUTIONAL NEGATIVE: 1
GASTROINTESTINAL NEGATIVE: 1
CARDIOVASCULAR NEGATIVE: 1
EYES NEGATIVE: 1
MUSCULOSKELETAL NEGATIVE: 1
NEUROLOGICAL NEGATIVE: 1
RESPIRATORY NEGATIVE: 1

## 2017-06-08 ASSESSMENT — PATIENT HEALTH QUESTIONNAIRE - PHQ9: CLINICAL INTERPRETATION OF PHQ2 SCORE: 1

## 2017-06-08 NOTE — PROGRESS NOTES
Subjective:      Karine Ovalle is a 52 y.o. female who presents with Medication Management    The patient is here for followup of chronic medical problems listed below. The patient is compliant with medications and having no side effects from them. Denies chest pain, abdominal pain, dyspnea, myalgias, or cough.   Patient Active Problem List    Diagnosis Date Noted   • Mild intermittent asthma without complication 04/25/2012     Priority: High   • Chronic pain syndrome- [place] [facility], dr sanders 06/08/2017   • Chronic use of opiate for therapeutic purpose 06/08/2017   • Severe obesity (BMI >= 40) (CMS-HCC) 10/21/2016   • Vitamin B 12 deficiency 06/02/2016   • Weight gain status post gastric bypass- in [place] 2002; was 320 lbs- got down to 155 08/12/2014   • Fibroids 08/13/2013   • Primary osteoarthritis of right knee- sp right TKR - dr nowak 08/13/2013     Review of patient's allergies indicates no known allergies.  Outpatient Prescriptions Prior to Visit   Medication Sig Dispense Refill   • Lidocaine HCl 3 % Cream Apply cream to affected area 2 times daily as needed 1 Tube 0   • meloxicam (MOBIC) 15 MG tablet 1 tab po qday with food for djd 90 Tab 1   • potassium chloride ER (KLOR-CON) 10 MEQ tablet TAKE ONE TABLET BY MOUTH ONCE DAILY 90 Tab 4   • Omega-3 Fatty Acids (FISH OIL) 1000 MG Cap capsule Take 3,000 mg by mouth every day.     • multivitamin (THERAGRAN) Tab Take 1 Tab by mouth every day.     • vitamin D (CHOLECALCIFEROL) 1000 UNIT Tab Take 1,000 Units by mouth every day.     • hydrocodone-acetaminophen (NORCO) 5-325 MG Tab per tablet 1 tab po bid prn SEVERE pain, NO DRIVING and NO ALCOHOL within 24 hrs of taking this medication, NO benzodiazepine medications with this medication, no selling or giving to any other persons (Patient not taking: Reported on 6/8/2017) 60 Tab 0   • gabapentin (NEURONTIN) 300 MG Cap 2 tab po qhs (Patient not taking: Reported on 6/8/2017) 180 Cap 1   • phentermine 37.5 MG  "capsule Take 1 Cap by mouth every morning. 90 Cap 1   • olopatadine (PATANOL) 0.1 % ophthalmic solution Place 1 Drop in both eyes 2 times a day. (Patient not taking: Reported on 12/2/2016) 5 mL 0   • furosemide (LASIX) 20 MG Tab TAKE ONE TABLET BY MOUTH ONCE DAILY 90 Tab 4   • erythromycin 5 MG/GM Ointment Apply 1 cm ribbon to inner lower eyelid QID x 5 days. (Patient not taking: Reported on 12/2/2016) 1 Tube 0     No facility-administered medications prior to visit.              HPI    Review of Systems   Constitutional: Negative.    HENT: Negative.    Eyes: Negative.    Respiratory: Negative.    Cardiovascular: Negative.    Gastrointestinal: Negative.    Genitourinary: Negative.    Musculoskeletal: Negative.    Skin: Negative.    Neurological: Negative.    Endo/Heme/Allergies: Negative.    Psychiatric/Behavioral: Negative.           Objective:     /76 mmHg  Pulse 89  Temp(Src) 36.5 °C (97.7 °F)  Ht 1.588 m (5' 2.52\")  Wt 106.595 kg (235 lb)  BMI 42.27 kg/m2  SpO2 97%  LMP 01/02/2016     Physical Exam   Constitutional: She is oriented to person, place, and time. She appears well-developed and well-nourished.   HENT:   Head: Normocephalic and atraumatic.   Right Ear: External ear normal.   Left Ear: External ear normal.   Nose: Nose normal.   Mouth/Throat: Oropharynx is clear and moist.   Eyes: Conjunctivae and EOM are normal. Pupils are equal, round, and reactive to light. Right eye exhibits no discharge. Left eye exhibits no discharge. No scleral icterus.   Neck: Normal range of motion. Neck supple. No JVD present. No tracheal deviation present. No thyromegaly present.   Cardiovascular: Normal rate, regular rhythm, normal heart sounds and intact distal pulses.  Exam reveals no gallop and no friction rub.    Pulmonary/Chest: Effort normal and breath sounds normal. No stridor. No respiratory distress. She has no wheezes. She has no rales. She exhibits no tenderness.   Abdominal: Soft. Bowel sounds are " normal. She exhibits no distension and no mass. There is no tenderness. There is no rebound and no guarding. No hernia.   Musculoskeletal: Normal range of motion. She exhibits no edema or tenderness.   Lymphadenopathy:     She has no cervical adenopathy.   Neurological: She is alert and oriented to person, place, and time. She has normal reflexes. She displays normal reflexes. No cranial nerve deficit. Coordination normal.   Skin: Skin is warm and dry. No rash noted. No erythema. No pallor.   Psychiatric: She has a normal mood and affect. Her behavior is normal. Judgment and thought content normal.   Nursing note and vitals reviewed.  No visits with results within 1 Month(s) from this visit.  Latest known visit with results is:    Office Visit on 10/19/2016   Component Date Value   • Rapid Influenza A-B 10/19/2016 NEGATIVE    • Internal Control Positive 10/19/2016 Valid    • Internal Control Negative 10/19/2016 Valid       No results found for: HBA1C  Lab Results   Component Value Date/Time    SODIUM 138 05/03/2016 07:32 AM    POTASSIUM 3.6 05/03/2016 07:32 AM    CHLORIDE 108 05/03/2016 07:32 AM    CO2 22 05/03/2016 07:32 AM    GLUCOSE 88 05/03/2016 07:32 AM    BUN 11 05/03/2016 07:32 AM    CREATININE 0.63 05/03/2016 07:32 AM    BUN-CREATININE RATIO 13 08/14/2010 12:00 AM    GLOM FILT RATE, EST >59 08/14/2010 12:00 AM    ALKALINE PHOSPHATASE 86 05/03/2016 07:32 AM    AST(SGOT) 31 05/03/2016 07:32 AM    ALT(SGPT) 18 05/03/2016 07:32 AM    TOTAL BILIRUBIN 0.5 05/03/2016 07:32 AM     Lab Results   Component Value Date/Time    INR 1.04 05/18/2012 12:20 PM     Lab Results   Component Value Date/Time    CHOLESTEROL, 05/03/2016 07:32 AM    LDL 61 05/03/2016 07:32 AM    HDL 72 05/03/2016 07:32 AM    TRIGLYCERIDES 61 05/03/2016 07:32 AM       No results found for: TESTOSTERONE  Lab Results   Component Value Date/Time    TSH 2.110 08/14/2010 12:00 AM     Lab Results   Component Value Date/Time    FREE T-4 0.72  05/03/2016 07:32 AM     Lab Results   Component Value Date/Time    URIC ACID 5.6 08/12/2014 04:49 PM     No components found for: VITB12  Lab Results   Component Value Date/Time    25-HYDROXY   VITAMIN D 25 33 08/12/2014 04:49 PM    25-HYDROXY   VITAMIN D 25 25* 07/03/2012 09:20 AM                   Assessment/Plan:     1. Severe obesity (BMI >= 40) (CMS-HCC)    diet/exercise/lose 15 lbs.; patient counseled to continue phentermine. Ordered. Discussed taking Wellbutrin and she has no successful splintering alone. His artery status post gastric bypass in 2002 after which she lost 250 pounds. Gained about 100 of that back      2. Mild intermittent asthma without complication     Under good control. Continue same regimen.      3. Vitamin B 12 deficiency.-Not controlled. Either resume the vitamin B-12 she's not taking it or double the dose.             - cyanocobalamin (VITAMIN B12) 1000 MCG Tab; Take 1 Tab by mouth every day.  Dispense: 90 Tab; Refill: 4    4. Primary osteoarthritis of right knee- sp right TKR - dr levine-still having pain right knee. Follow up with Dr. Levine. She had a recent fall and swelling in right Now so will need ultrasound rule out DVT. Call results.       5. Screen for colon cancer     - REFERRAL TO GI FOR COLONOSCOPY  - OCCULT BLOOD FECES IMMUNOASSAY; Future  - REFERRAL TO GASTROENTEROLOGY    6. Chronic pain syndrome-Continue follow-up with     [place] [facility]-as above     - oxycodone immediate-release (ROXICODONE) 5 MG Tab; Take 1 Tab by mouth every 8 hours as needed for Severe Pain.  Dispense: 90 Tab; Refill: 0    7. Chronic use of opiate for therapeutic purpose-as above     - oxycodone immediate-release (ROXICODONE) 5 MG Tab; Take 1 Tab by mouth every 8 hours as needed for Severe Pain.  Dispense: 90 Tab; Refill: 0    8. Encounter for screening mammogram for breast cancer     - MA-SCREEN MAMMO W/CAD-BILAT; Future    9. Pain and swelling of right lower leg-rule out DVT. She  does have some mild tenderness in the right calf with swelling. Difficult to assess with her morbid obesity.      - LE VENOUS DUPLEX; Future    10. Post-menopausal bleeding she attributes this to tizanidine prescription. She stopped that medicine. She'll need see gynecologist however to rule out uterine CA. She did not have Pap smear for 4 years. With her morbid obesity she certainly at risk for either uterine or ovarian CA. Also needs mammogram for which she is at risk and this was ordered.     - REFERRAL TO GYNECOLOGY    11. Uterine leiomyoma, unspecified location-as above.       12. Obesity (BMI 30-39.9) as above.  - phentermine 37.5 MG capsule; Take 1 Cap by mouth every morning.  Dispense: 90 Cap; Refill: 0    13. BMI 39.0-39.9,adult     - phentermine 37.5 MG capsule; Take 1 Cap by mouth every morning.  Dispense: 90 Cap; Refill: 0    14. Muscle spasm of back -continue follow-up with [place] pain specialist.       15. Primary osteoarthritis involving multiple joints -follow-up with her rheumatologist.       16. Pain of both hip joints-12 rheumatology       17. Chronic midline low back pain without sciatica-follow-up with [place] [facility]    18. Status post right knee replacement    Under good control. Continue same regimen.       19. Hyperlipidemia, unspecified hyperlipidemia type recheck labs. Previously normal off medication     - TSH; Future  - COMP METABOLIC PANEL; Future  - LIPID PROFILE; Future  - CBC WITH DIFFERENTIAL; Future    20. Iron deficiency-this needs to be rechecked in her gastric bypass. And also because her uterine bleeding  - IRON/TOTAL IRON BIND; Future  - FERRITIN; Future  - IRON/TOTAL IRON BIND; Future      40 minute face-to-face encounter took place today.  More than half of this time was spent in the coordination of care of the above problems, as well as counseling.          40 minute face-to-face encounter took place today.  More than half of this time was spent in the  coordination of care of the above problems, as well as counseling.

## 2017-06-08 NOTE — MR AVS SNAPSHOT
"        Karine Ovalle   2017 2:20 PM   Office Visit   MRN: [id number]    Department:  [address] [place] [address]   Dept Phone:  [phone]    Description:  Female : 1965   Provider:  Micky Rubin M.D.           Reason for Visit     Medication Management diet      Allergies as of 2017     No Known Allergies      You were diagnosed with     Severe obesity (BMI >= 40) (CMS-HCC)   [992326]       Mild intermittent asthma without complication   [619972]       Vitamin B 12 deficiency   [873409]       Primary osteoarthritis of right knee   [584512]       Screen for colon cancer   [135390]       Chronic pain syndrome   [338.4.ICD-9-CM]       Chronic use of opiate for therapeutic purpose   [2387243]       Encounter for screening mammogram for breast cancer   [3721025]       Pain and swelling of right lower leg   [1464404]       Post-menopausal bleeding   [928372]       Uterine leiomyoma, unspecified location   [0912473]       Obesity (BMI 30-39.9)   [048758]       BMI 39.0-39.9,adult   [476239]       Muscle spasm of back   [625955]       Primary osteoarthritis involving multiple joints   [6201479]       Pain of both hip joints   [0003655]       Chronic midline low back pain without sciatica   [0804891]       Status post right knee replacement   [8608889]       Hyperlipidemia, unspecified hyperlipidemia type   [5859244]       Iron deficiency   [814056]         Vital Signs     Blood Pressure Pulse Temperature Height Weight Body Mass Index    106/76 mmHg 89 36.5 °C (97.7 °F) 1.588 m (5' 2.52\") 106.595 kg (235 lb) 42.27 kg/m2    Oxygen Saturation Last Menstrual Period Smoking Status             97% 2016 Never Smoker          Basic Information     Date Of Birth Sex Race Ethnicity Preferred Language    1965 Female Black or  Non- English      Your appointments     2017  3:00 PM   Follow Up Visit with Sandy Jenkins M.D.   [facility] (--)    [address] [facility], [address]  [facility] [id number] [phone]           You will be receiving a confirmation call a few days before your appointment from our automated call confirmation system.            Sep 12, 2017  3:20 PM   Established Patient with Micky Rubin M.D.   [facility] [address] ([facility])    [address]  [facility] [id number] [phone]           You will be receiving a confirmation call a few days before your appointment from our automated call confirmation system.              Problem List              ICD-10-CM Priority Class Noted - Resolved    Mild intermittent asthma without complication J45.20 High  4/25/2012 - Present    Fibroids D25.9   8/13/2013 - Present    Primary osteoarthritis of right knee- sp right TKR - dr nowak M17.11   8/13/2013 - Present    Weight gain status post gastric bypass- in [place] 2002; was 320 lbs- got down to 155 R63.5   8/12/2014 - Present    Vitamin B 12 deficiency E53.8   6/2/2016 - Present    Severe obesity (BMI >= 40) (CMS-HCC) E66.01   10/21/2016 - Present    Chronic pain syndrome- [place] adv [facility], dr sanders G89.4   6/8/2017 - Present    Chronic use of opiate for therapeutic purpose Z79.891   6/8/2017 - Present      Health Maintenance        Date Due Completion Dates    IMM PNEUMOCOCCAL 19-64 (ADULT) MEDIUM RISK SERIES (1 of 1 - PPSV23) 5/17/1984 ---    COLONOSCOPY 5/17/2015 ---    PAP SMEAR 7/9/2016 7/9/2013, 10/14/2011, 8/10/2010    MAMMOGRAM 6/30/2018 6/30/2016 (Done), 8/19/2014, 7/26/2013, 7/6/2012, 8/20/2010, 8/20/2010, 9/30/2008, 9/30/2008, 10/25/2007, 10/25/2007    Override on 6/30/2016: Done    IMM DTaP/Tdap/Td Vaccine (2 - Td) 5/7/2022 5/7/2012            Current Immunizations     Tdap Vaccine 5/7/2012      Below and/or attached are the medications your provider expects you to take. Review all of your home medications and newly ordered medications with your provider and/or pharmacist. Follow medication instructions as directed by your  provider and/or pharmacist. Please keep your medication list with you and share with your provider. Update the information when medications are discontinued, doses are changed, or new medications (including over-the-counter products) are added; and carry medication information at all times in the event of emergency situations     Allergies:  No Known Allergies          Medications  Valid as of: June 08, 2017 -  3:26 PM    Generic Name Brand Name Tablet Size Instructions for use    Cholecalciferol (Tab) cholecalciferol 1000 UNIT Take 1,000 Units by mouth every day.        Cyanocobalamin (Tab) VITAMIN B12 1000 MCG Take 1 Tab by mouth every day.        Furosemide (Tab) LASIX 20 MG TAKE ONE TABLET BY MOUTH ONCE DAILY        Lidocaine HCl (Cream) Lidocaine HCl 3 % Apply cream to affected area 2 times daily as needed        Meloxicam (Tab) MOBIC 15 MG 1 tab po qday with food for djd        Multiple Vitamin (Tab) THERAGRAN  Take 1 Tab by mouth every day.        Omega-3 Fatty Acids (Cap) fish oil 1000 MG Take 3,000 mg by mouth every day.        OxyCODONE HCl (Tab) ROXICODONE 5 MG Take 1 Tab by mouth every 8 hours as needed for Severe Pain.        Phentermine HCl (Cap) phentermine 37.5 MG Take 1 Cap by mouth every morning.        Potassium Chloride (Tab CR) KLOR-CON 10 MEQ TAKE ONE TABLET BY MOUTH ONCE DAILY        .                 Medicines prescribed today were sent to:     [facility] [address] [place] [address] (S), [place] - [address] [address] [facility] (S) [place] [address]    Phone: [phone] Fax: [phone]    Open 24 Hours?: No      Medication refill instructions:       If your prescription bottle indicates you have medication refills left, it is not necessary to call your provider’s office. Please contact your pharmacy and they will refill your medication.    If your prescription bottle indicates you do not have any refills left, you may request refills at any time through one of the following ways: The online [employer]  system (except Urgent Care), by calling your provider’s office, or by asking your pharmacy to contact your provider’s office with a refill request. Medication refills are processed only during regular business hours and may not be available until the next business day. Your provider may request additional information or to have a follow-up visit with you prior to refilling your medication.   *Please Note: Medication refills are assigned a new Rx number when refilled electronically. Your pharmacy may indicate that no refills were authorized even though a new prescription for the same medication is available at the pharmacy. Please request the medicine by name with the pharmacy before contacting your provider for a refill.        Your To Do List     Future Labs/Procedures Complete By Expires    CBC WITH DIFFERENTIAL  As directed 6/8/2018    COMP METABOLIC PANEL  As directed 6/8/2018    FERRITIN  As directed 6/8/2018    IRON/TOTAL IRON BIND  As directed 6/8/2018    IRON/TOTAL IRON BIND  As directed 6/8/2018    LE VENOUS DUPLEX  As directed 12/8/2017    LIPID PROFILE  As directed 6/8/2018    MA-SCREEN MAMMO W/CAD-BILAT  As directed 7/8/2018    OCCULT BLOOD FECES IMMUNOASSAY  As directed 6/8/2018    TSH  As directed 6/8/2018      Referral     A referral request has been sent to our patient care coordination department. Please allow 3-5 business days for us to process this request and contact you either by phone or mail. If you do not hear from us by the 5th business day, please call us at [phone].           [employer] Access Code: Activation code not generated  Current [employer] Status: Active

## 2017-06-09 RX ORDER — OLOPATADINE HYDROCHLORIDE 1 MG/ML
SOLUTION/ DROPS OPHTHALMIC
Refills: 0 | OUTPATIENT
Start: 2017-06-09

## 2017-06-12 ENCOUNTER — HOSPITAL ENCOUNTER (OUTPATIENT)
Dept: LAB | Facility: MEDICAL CENTER | Age: 52
End: 2017-06-12
Attending: INTERNAL MEDICINE
Payer: COMMERCIAL

## 2017-06-12 DIAGNOSIS — E78.5 HYPERLIPIDEMIA, UNSPECIFIED HYPERLIPIDEMIA TYPE: ICD-10-CM

## 2017-06-12 DIAGNOSIS — H10.13 ALLERGIC CONJUNCTIVITIS OF BOTH EYES: ICD-10-CM

## 2017-06-12 DIAGNOSIS — E61.1 IRON DEFICIENCY: ICD-10-CM

## 2017-06-12 DIAGNOSIS — R73.03 BORDERLINE TYPE 2 DIABETES MELLITUS: ICD-10-CM

## 2017-06-12 LAB
ALBUMIN SERPL BCP-MCNC: 3.6 G/DL (ref 3.2–4.9)
ALBUMIN/GLOB SERPL: 1 G/DL
ALP SERPL-CCNC: 100 U/L (ref 30–99)
ALT SERPL-CCNC: 10 U/L (ref 2–50)
ANION GAP SERPL CALC-SCNC: 6 MMOL/L (ref 0–11.9)
AST SERPL-CCNC: 18 U/L (ref 12–45)
BASOPHILS # BLD AUTO: 0.5 % (ref 0–1.8)
BASOPHILS # BLD: 0.04 K/UL (ref 0–0.12)
BILIRUB SERPL-MCNC: 0.7 MG/DL (ref 0.1–1.5)
BUN SERPL-MCNC: 11 MG/DL (ref 8–22)
CALCIUM SERPL-MCNC: 9 MG/DL (ref 8.5–10.5)
CHLORIDE SERPL-SCNC: 102 MMOL/L (ref 96–112)
CHOLEST SERPL-MCNC: 144 MG/DL (ref 100–199)
CO2 SERPL-SCNC: 26 MMOL/L (ref 20–33)
CREAT SERPL-MCNC: 0.75 MG/DL (ref 0.5–1.4)
EOSINOPHIL # BLD AUTO: 0.28 K/UL (ref 0–0.51)
EOSINOPHIL NFR BLD: 3.8 % (ref 0–6.9)
ERYTHROCYTE [DISTWIDTH] IN BLOOD BY AUTOMATED COUNT: 47.5 FL (ref 35.9–50)
EST. AVERAGE GLUCOSE BLD GHB EST-MCNC: 105 MG/DL
FERRITIN SERPL-MCNC: 18.8 NG/ML (ref 10–291)
GFR SERPL CREATININE-BSD FRML MDRD: >60 ML/MIN/1.73 M 2
GLOBULIN SER CALC-MCNC: 3.5 G/DL (ref 1.9–3.5)
GLUCOSE SERPL-MCNC: 96 MG/DL (ref 65–99)
HBA1C MFR BLD: 5.3 % (ref 0–5.6)
HCT VFR BLD AUTO: 37.3 % (ref 37–47)
HDLC SERPL-MCNC: 69 MG/DL
HGB BLD-MCNC: 12.9 G/DL (ref 12–16)
IMM GRANULOCYTES # BLD AUTO: 0.01 K/UL (ref 0–0.11)
IMM GRANULOCYTES NFR BLD AUTO: 0.1 % (ref 0–0.9)
IRON SATN MFR SERPL: 38 % (ref 15–55)
IRON SERPL-MCNC: 166 UG/DL (ref 40–170)
LDLC SERPL CALC-MCNC: 63 MG/DL
LYMPHOCYTES # BLD AUTO: 2.3 K/UL (ref 1–4.8)
LYMPHOCYTES NFR BLD: 31.3 % (ref 22–41)
MCH RBC QN AUTO: 28.6 PG (ref 27–33)
MCHC RBC AUTO-ENTMCNC: 34.6 G/DL (ref 33.6–35)
MCV RBC AUTO: 82.7 FL (ref 81.4–97.8)
MONOCYTES # BLD AUTO: 0.76 K/UL (ref 0–0.85)
MONOCYTES NFR BLD AUTO: 10.4 % (ref 0–13.4)
NEUTROPHILS # BLD AUTO: 3.95 K/UL (ref 2–7.15)
NEUTROPHILS NFR BLD: 53.9 % (ref 44–72)
NRBC # BLD AUTO: 0 K/UL
NRBC BLD AUTO-RTO: 0 /100 WBC
PLATELET # BLD AUTO: 304 K/UL (ref 164–446)
PMV BLD AUTO: 10.4 FL (ref 9–12.9)
POTASSIUM SERPL-SCNC: 3.9 MMOL/L (ref 3.6–5.5)
PROT SERPL-MCNC: 7.1 G/DL (ref 6–8.2)
RBC # BLD AUTO: 4.51 M/UL (ref 4.2–5.4)
SODIUM SERPL-SCNC: 134 MMOL/L (ref 135–145)
TIBC SERPL-MCNC: 437 UG/DL (ref 250–450)
TRIGL SERPL-MCNC: 60 MG/DL (ref 0–149)
TSH SERPL DL<=0.005 MIU/L-ACNC: 2.62 UIU/ML (ref 0.3–3.7)
WBC # BLD AUTO: 7.3 K/UL (ref 4.8–10.8)

## 2017-06-12 PROCEDURE — 84443 ASSAY THYROID STIM HORMONE: CPT

## 2017-06-12 PROCEDURE — 36415 COLL VENOUS BLD VENIPUNCTURE: CPT

## 2017-06-12 PROCEDURE — 83540 ASSAY OF IRON: CPT

## 2017-06-12 PROCEDURE — 82728 ASSAY OF FERRITIN: CPT

## 2017-06-12 PROCEDURE — 83036 HEMOGLOBIN GLYCOSYLATED A1C: CPT

## 2017-06-12 PROCEDURE — 85025 COMPLETE CBC W/AUTO DIFF WBC: CPT

## 2017-06-12 PROCEDURE — 83550 IRON BINDING TEST: CPT

## 2017-06-12 PROCEDURE — 80061 LIPID PANEL: CPT

## 2017-06-12 PROCEDURE — 80053 COMPREHEN METABOLIC PANEL: CPT

## 2017-06-12 RX ORDER — OLOPATADINE HYDROCHLORIDE 1 MG/ML
1 SOLUTION/ DROPS OPHTHALMIC 2 TIMES DAILY
Qty: 5 ML | Refills: 11 | Status: ON HOLD | OUTPATIENT
Start: 2017-06-12 | End: 2018-03-20

## 2017-06-14 ENCOUNTER — HOSPITAL ENCOUNTER (OUTPATIENT)
Dept: RADIOLOGY | Facility: MEDICAL CENTER | Age: 52
End: 2017-06-14
Attending: INTERNAL MEDICINE
Payer: COMMERCIAL

## 2017-06-14 DIAGNOSIS — M79.89 PAIN AND SWELLING OF RIGHT LOWER LEG: ICD-10-CM

## 2017-06-14 DIAGNOSIS — M79.661 PAIN AND SWELLING OF RIGHT LOWER LEG: ICD-10-CM

## 2017-06-14 PROCEDURE — 93971 EXTREMITY STUDY: CPT

## 2017-06-15 ENCOUNTER — PATIENT MESSAGE (OUTPATIENT)
Dept: MEDICAL GROUP | Age: 52
End: 2017-06-15

## 2017-06-15 DIAGNOSIS — R22.41 MASS OF RIGHT LOWER LEG: ICD-10-CM

## 2017-06-15 NOTE — TELEPHONE ENCOUNTER
I don't know. You should discuss this with Dr. Levine, your orthopedic surgeon Referral was placed to him. Please make an appointment to see him to discuss the mass in her leg.

## 2017-06-15 NOTE — TELEPHONE ENCOUNTER
From: Ana Lawler  To: Micky Rubin M.D.  Sent: 6/15/2017 10:55 AM PDT  Subject: Test Result Question    Thank you for getting back to me so soon. So do you think the hard mass is gout?   What should do about the semi hard mass on my lower leg? Should I continue to take the water pills and maybe wrap leg for more  compression?    Glad that I didn't have diabetes mellitus or problems with my liver and kidney.    Ana Lawler  ----- Message -----  From: Micky Rubin M.D.  Sent: 6/15/2017 8:58 AM PDT  To: Ana Lawler  Subject: RE: Test Result Question    Your leg ultrasound was normal and showed no evidence of any blood clot. All of your laboratory studies were also normal.    ----- Message -----   From: ANA LAWLER   Sent: 6/15/2017 12:36 AM PDT   To: Micky Rubin M.D.  Subject: Test Result Question    Dr. Rubin    Please notify me of my results of both my recent lab work (6/12),  And the sonogram (6/14).      Sincerely,    Ana Lawler

## 2017-06-15 NOTE — TELEPHONE ENCOUNTER
Leg ultrasound was negative for DVTand all laboratory studies were normal. My chart message sent to patient

## 2017-06-15 NOTE — TELEPHONE ENCOUNTER
From: Karine Ovalle  To: Micky Rubin M.D.  Sent: 6/15/2017 12:36 AM PDT  Subject: Test Result Question    Dr. Rubin    Please notify me of my results of both my recent lab work (6/12),  And the sonogram (6/14).      Sincerely,    Karine Ovalle

## 2017-06-22 ENCOUNTER — APPOINTMENT (OUTPATIENT)
Dept: RHEUMATOLOGY | Facility: PHYSICIAN GROUP | Age: 52
End: 2017-06-22
Payer: COMMERCIAL

## 2017-07-06 ENCOUNTER — OFFICE VISIT (OUTPATIENT)
Dept: URGENT CARE | Facility: CLINIC | Age: 52
End: 2017-07-06
Payer: COMMERCIAL

## 2017-07-06 VITALS
BODY MASS INDEX: 40.04 KG/M2 | HEIGHT: 63 IN | RESPIRATION RATE: 16 BRPM | SYSTOLIC BLOOD PRESSURE: 126 MMHG | WEIGHT: 226 LBS | HEART RATE: 101 BPM | TEMPERATURE: 98 F | OXYGEN SATURATION: 98 % | DIASTOLIC BLOOD PRESSURE: 88 MMHG

## 2017-07-06 DIAGNOSIS — S93.401A SPRAIN OF RIGHT ANKLE, UNSPECIFIED LIGAMENT, INITIAL ENCOUNTER: ICD-10-CM

## 2017-07-06 PROCEDURE — 99214 OFFICE O/P EST MOD 30 MIN: CPT | Performed by: PHYSICIAN ASSISTANT

## 2017-07-06 NOTE — MR AVS SNAPSHOT
"        Karine Ovalle   2017 2:30 PM   Office Visit   MRN: [id number]    Department:  [facility]   Dept Phone:  [phone]    Description:  Female : 1965   Provider:  Erin Streeter PA-C           Reason for Visit     Ankle Injury Right ankle swelling      Allergies as of 2017     No Known Allergies      You were diagnosed with     Sprain of right ankle, unspecified ligament, initial encounter   [9455706]         Vital Signs     Blood Pressure Pulse Temperature Respirations Height Weight    126/88 mmHg 101 36.7 °C (98 °F) 16 1.6 m (5' 2.99\") 102.513 kg (226 lb)    Body Mass Index Oxygen Saturation Last Menstrual Period Smoking Status          40.04 kg/m2 98% 2016 Never Smoker         Basic Information     Date Of Birth Sex Race Ethnicity Preferred Language    1965 Female Black or  Non- English      Your appointments     2017  3:40 PM   MA SCRN10 with S MELODY MG 1   [facility] ([place])    [address]  [place] [address] [phone]           No deodorant, powder, perfume or lotion under the arm or breast area.            Aug 15, 2017  3:00 PM   New Patient with Malissa Barrera M.D.   [facility] ([address])    [address] [place] [address], [address]  [place] [id number] [phone]            Sep 12, 2017  3:20 PM   Established Patient with Micky Rubin M.D.   [address])    [address] [place] [address]  [place] [id number] [phone]           You will be receiving a confirmation call a few days before your appointment from our automated call confirmation system.              Problem List              ICD-10-CM Priority Class Noted - Resolved    Mild intermittent asthma without complication J45.20 High  2012 - Present    Fibroids D25.9   2013 - Present    Primary osteoarthritis of right knee- sp right TKR - dr nowak M17.11  "  8/13/2013 - Present    Weight gain status post gastric bypass- in [place] 2002; was 320 lbs- got down to 155 R63.5   8/12/2014 - Present    Vitamin B 12 deficiency E53.8   6/2/2016 - Present    Severe obesity (BMI >= 40) (CMS-HCC) E66.01   10/21/2016 - Present    Chronic pain syndrome- [place] adv pain, dr sanders G89.4   6/8/2017 - Present    Chronic use of opiate for therapeutic purpose Z79.891   6/8/2017 - Present      Health Maintenance        Date Due Completion Dates    IMM PNEUMOCOCCAL 19-64 (ADULT) MEDIUM RISK SERIES (1 of 1 - PPSV23) 5/17/1984 ---    COLONOSCOPY 5/17/2015 ---    PAP SMEAR 7/9/2016 7/9/2013, 10/14/2011, 8/10/2010    IMM INFLUENZA (1) 9/1/2017 ---    MAMMOGRAM 6/30/2018 6/30/2016 (Done), 8/19/2014, 7/26/2013, 7/6/2012, 8/20/2010, 8/20/2010, 9/30/2008, 9/30/2008, 10/25/2007, 10/25/2007    Override on 6/30/2016: Done    IMM DTaP/Tdap/Td Vaccine (2 - Td) 5/7/2022 5/7/2012            Current Immunizations     Tdap Vaccine 5/7/2012      Below and/or attached are the medications your provider expects you to take. Review all of your home medications and newly ordered medications with your provider and/or pharmacist. Follow medication instructions as directed by your provider and/or pharmacist. Please keep your medication list with you and share with your provider. Update the information when medications are discontinued, doses are changed, or new medications (including over-the-counter products) are added; and carry medication information at all times in the event of emergency situations     Allergies:  No Known Allergies          Medications  Valid as of: July 10, 2017 -  5:24 PM    Generic Name Brand Name Tablet Size Instructions for use    Cholecalciferol (Tab) cholecalciferol 1000 UNIT Take 1,000 Units by mouth every day.        Cyanocobalamin (Tab) VITAMIN B12 1000 MCG Take 1 Tab by mouth every day.        Furosemide (Tab) LASIX 20 MG TAKE ONE TABLET BY MOUTH ONCE DAILY        Lidocaine HCl  (Cream) Lidocaine HCl 3 % Apply cream to affected area 2 times daily as needed        Meloxicam (Tab) MOBIC 15 MG 1 tab po qday with food for djd        Multiple Vitamin (Tab) THERAGRAN  Take 1 Tab by mouth every day.        Olopatadine HCl (Solution) PATANOL 0.1 % Place 1 Drop in both eyes 2 times a day.        Omega-3 Fatty Acids (Cap) fish oil 1000 MG Take 3,000 mg by mouth every day.        OxyCODONE HCl (Tab) ROXICODONE 5 MG Take 1 Tab by mouth every 8 hours as needed for Severe Pain.        Phentermine HCl (Cap) phentermine 37.5 MG Take 1 Cap by mouth every morning.        Potassium Chloride (Tab CR) KLOR-CON 10 MEQ TAKE ONE TABLET BY MOUTH ONCE DAILY        .                 Medicines prescribed today were sent to:     [facility] [address] [place] [address] (S), [place] - [address]    [facility] [facility] [place] (S) [place] [address]    Phone: [phone] Fax: [phone]    Open 24 Hours?: No      Medication refill instructions:       If your prescription bottle indicates you have medication refills left, it is not necessary to call your provider’s office. Please contact your pharmacy and they will refill your medication.    If your prescription bottle indicates you do not have any refills left, you may request refills at any time through one of the following ways: The online "[employer]" system (except Urgent Care), by calling your provider’s office, or by asking your pharmacy to contact your provider’s office with a refill request. Medication refills are processed only during regular business hours and may not be available until the next business day. Your provider may request additional information or to have a follow-up visit with you prior to refilling your medication.   *Please Note: Medication refills are assigned a new Rx number when refilled electronically. Your pharmacy may indicate that no refills were authorized even though a new prescription for the same medication is available at the pharmacy. Please request the  medicine by name with the pharmacy before contacting your provider for a refill.           MyChart Access Code: Activation code not generated  Current MyChart Status: Active

## 2017-07-06 NOTE — Clinical Note
July 6, 2017         Patient: Karine Ovalle   YOB: 1965   Date of Visit: 7/6/2017           To Whom it May Concern:    Karine Ovalle was seen in my clinic on 7/6/2017. Please excuse her absence today, 7/6/17.    If you have any questions or concerns, please don't hesitate to call.        Sincerely,           Erin Streeter PA-C  Electronically Signed

## 2017-07-08 ASSESSMENT — ENCOUNTER SYMPTOMS
LOSS OF SENSATION: 0
NAUSEA: 0
INABILITY TO BEAR WEIGHT: 0
DIZZINESS: 0
LOSS OF MOTION: 0
TINGLING: 0
NUMBNESS: 0
FEVER: 0
HEADACHES: 0
VOMITING: 0
SHORTNESS OF BREATH: 0
CHILLS: 0

## 2017-07-09 NOTE — PROGRESS NOTES
"Subjective:      Karine Ovalle is a 52 y.o. female who presents with Ankle Injury            Ankle Injury   The incident occurred 1 to 3 hours ago. The incident occurred at home. The injury mechanism was a fall. The pain is present in the right ankle. The quality of the pain is described as aching. The pain is at a severity of 5/10. The pain is moderate. The pain has been constant since onset. Pertinent negatives include no inability to bear weight, loss of motion, loss of sensation, numbness or tingling. The symptoms are aggravated by weight bearing. She has tried nothing for the symptoms.     Patient presents to urgent care reporting right ankle pain after falling down a couple stairs at home. She is unsure how she fell, but reports pain to her medial ankle. No previous ankle injuries. She is able bear weight. No swelling or distal numbness/tingling.     Review of Systems   Constitutional: Negative for fever and chills.   Respiratory: Negative for shortness of breath.    Cardiovascular: Negative for chest pain.   Gastrointestinal: Negative for nausea and vomiting.   Genitourinary: Negative.    Musculoskeletal:        Right ankle pain   Neurological: Negative for dizziness, tingling, numbness and headaches.          Objective:     /88 mmHg  Pulse 101  Temp(Src) 36.7 °C (98 °F)  Resp 16  Ht 1.6 m (5' 2.99\")  Wt 102.513 kg (226 lb)  BMI 40.04 kg/m2  SpO2 98%  LMP 01/02/2016     Physical Exam   Constitutional: She is oriented to person, place, and time. She appears well-developed and well-nourished. No distress.   HENT:   Head: Normocephalic and atraumatic.   Eyes: Pupils are equal, round, and reactive to light.   Neck: Normal range of motion.   Cardiovascular: Normal rate.    Pulmonary/Chest: Effort normal.   Musculoskeletal: Normal range of motion.        Right ankle: She exhibits normal range of motion, no swelling and no ecchymosis. Tenderness. Achilles tendon exhibits no pain and normal " Padron's test results.        Feet:    TTP over medial ankle, inferior to medial malleolus   Neurological: She is alert and oriented to person, place, and time.   Skin: Skin is warm and dry. She is not diaphoretic.   Psychiatric: She has a normal mood and affect. Her behavior is normal.   Nursing note and vitals reviewed.         PMH:  has a past medical history of Arthritis; Dental disorder; and Pain. She also has no past medical history of ASTHMA, Diabetes, or Breast cancer (CMS-[facility]).  MEDS:   Current outpatient prescriptions:   •  olopatadine (PATANOL) 0.1 % ophthalmic solution, Place 1 Drop in both eyes 2 times a day., Disp: 5 mL, Rfl: 11  •  phentermine 37.5 MG capsule, Take 1 Cap by mouth every morning., Disp: 90 Cap, Rfl: 0  •  cyanocobalamin (VITAMIN B12) 1000 MCG Tab, Take 1 Tab by mouth every day., Disp: 90 Tab, Rfl: 4  •  meloxicam (MOBIC) 15 MG tablet, 1 tab po qday with food for djd, Disp: 90 Tab, Rfl: 1  •  furosemide (LASIX) 20 MG Tab, TAKE ONE TABLET BY MOUTH ONCE DAILY, Disp: 90 Tab, Rfl: 4  •  potassium chloride ER (KLOR-CON) 10 MEQ tablet, TAKE ONE TABLET BY MOUTH ONCE DAILY, Disp: 90 Tab, Rfl: 4  •  oxycodone immediate-release (ROXICODONE) 5 MG Tab, Take 1 Tab by mouth every 8 hours as needed for Severe Pain., Disp: 90 Tab, Rfl: 0  •  Lidocaine HCl 3 % Cream, Apply cream to affected area 2 times daily as needed, Disp: 1 Tube, Rfl: 0  •  Omega-3 Fatty Acids (FISH OIL) 1000 MG Cap capsule, Take 3,000 mg by mouth every day., Disp: , Rfl:   •  multivitamin (THERAGRAN) Tab, Take 1 Tab by mouth every day., Disp: , Rfl:   •  vitamin D (CHOLECALCIFEROL) 1000 UNIT Tab, Take 1,000 Units by mouth every day., Disp: , Rfl:   ALLERGIES: No Known Allergies  SURGHX:   Past Surgical History   Procedure Laterality Date   • Gastric bypass laparoscopic  2002     [facility]   • Abdominal exploration     • Plastic surgery  2004     excess skin on arms and legs removed   • Debridement  5/17/2012     Performed by  BRADLEY DYKES at SURGERY [facility]   • Pr enlarge breast with implant  2004   • Appendectomy laparoscopic  3/21/2013     Performed by Dali Queen M.D. at SURGERY [facility]   • Knee arthroplasty total Right 10/20/2015     Procedure: KNEE ARTHROPLASTY TOTAL;  Surgeon: Bryon Levine M.D.;  Location: SURGERY [facility];  Service:      SOCHX:  reports that she has never smoked. She has never used smokeless tobacco. She reports that she drinks about 2.0 oz of alcohol per week. She reports that she does not use illicit drugs.  FH: family history includes Diabetes in her mother; Heart Disease in her mother.       Assessment/Plan:     1. Sprain of right ankle, unspecified ligament, initial encounter    Ankle wrapped in clinic with ACE bandage  RICE therapy  Icing/heating 3-4 times daily  OTC tylenol as needed for pain relief, patient already takes Mobic daily  Avoid strenuous activity  Call or return to office if symptoms persist or worsen

## 2017-07-25 ENCOUNTER — TELEPHONE (OUTPATIENT)
Dept: MEDICAL GROUP | Age: 52
End: 2017-07-25

## 2017-07-26 ENCOUNTER — OFFICE VISIT (OUTPATIENT)
Dept: MEDICAL GROUP | Age: 52
End: 2017-07-26
Payer: COMMERCIAL

## 2017-07-26 ENCOUNTER — TELEPHONE (OUTPATIENT)
Dept: MEDICAL GROUP | Age: 52
End: 2017-07-26

## 2017-07-26 ENCOUNTER — HOSPITAL ENCOUNTER (OUTPATIENT)
Dept: RADIOLOGY | Facility: MEDICAL CENTER | Age: 52
End: 2017-07-26
Attending: INTERNAL MEDICINE
Payer: COMMERCIAL

## 2017-07-26 VITALS
OXYGEN SATURATION: 97 % | WEIGHT: 226.2 LBS | HEIGHT: 62 IN | DIASTOLIC BLOOD PRESSURE: 76 MMHG | BODY MASS INDEX: 41.62 KG/M2 | TEMPERATURE: 98.6 F | SYSTOLIC BLOOD PRESSURE: 128 MMHG | HEART RATE: 114 BPM

## 2017-07-26 DIAGNOSIS — M25.471 SWELLING OF ANKLE, RIGHT: ICD-10-CM

## 2017-07-26 DIAGNOSIS — M17.11 PRIMARY OSTEOARTHRITIS OF RIGHT KNEE: ICD-10-CM

## 2017-07-26 DIAGNOSIS — Z79.891 CHRONIC USE OF OPIATE FOR THERAPEUTIC PURPOSE: ICD-10-CM

## 2017-07-26 DIAGNOSIS — M54.50 ACUTE LOW BACK PAIN DUE TO TRAUMA: ICD-10-CM

## 2017-07-26 DIAGNOSIS — S93.401A SPRAIN OF RIGHT ANKLE, UNSPECIFIED LIGAMENT, INITIAL ENCOUNTER: ICD-10-CM

## 2017-07-26 DIAGNOSIS — M25.551 ACUTE RIGHT HIP PAIN: ICD-10-CM

## 2017-07-26 DIAGNOSIS — E66.01 SEVERE OBESITY (BMI >= 40) (HCC): ICD-10-CM

## 2017-07-26 DIAGNOSIS — Z02.9 ADMINISTRATIVE ENCOUNTER: ICD-10-CM

## 2017-07-26 DIAGNOSIS — J45.20 MILD INTERMITTENT ASTHMA WITHOUT COMPLICATION: ICD-10-CM

## 2017-07-26 DIAGNOSIS — G89.4 CHRONIC PAIN SYNDROME: ICD-10-CM

## 2017-07-26 DIAGNOSIS — G89.11 ACUTE LOW BACK PAIN DUE TO TRAUMA: ICD-10-CM

## 2017-07-26 DIAGNOSIS — M79.9 LESION OF SOFT TISSUE OF LOWER LEG AND ANKLE: ICD-10-CM

## 2017-07-26 PROBLEM — M25.473 SWELLING OF ANKLE: Status: ACTIVE | Noted: 2017-07-26

## 2017-07-26 PROCEDURE — 73610 X-RAY EXAM OF ANKLE: CPT | Mod: RT

## 2017-07-26 PROCEDURE — 99215 OFFICE O/P EST HI 40 MIN: CPT | Performed by: INTERNAL MEDICINE

## 2017-07-26 ASSESSMENT — ENCOUNTER SYMPTOMS
MUSCULOSKELETAL NEGATIVE: 1
RESPIRATORY NEGATIVE: 1
NEUROLOGICAL NEGATIVE: 1
CONSTITUTIONAL NEGATIVE: 1
GASTROINTESTINAL NEGATIVE: 1
CARDIOVASCULAR NEGATIVE: 1
EYES NEGATIVE: 1
PSYCHIATRIC NEGATIVE: 1

## 2017-07-26 NOTE — TELEPHONE ENCOUNTER
ESTABLISHED PATIENT PRE-VISIT PLANNING     Note: Patient will not be contacted if there is no indication to call.     1.  Reviewed notes from the last few office visits within the medical group: Yes    2.  If any orders were placed at last visit or intended to be done for this visit (i.e. 6 mos follow-up), do we have Results/Consult Notes?        •  Labs - Labs ordered, completed and results are in chart.       •  Imaging - Imaging ordered, completed and results are in chart.       •  Referrals - Referral ordered, patient was seen and consult notes are in chart. Care Teams updated  YES.    3. Is this appointment scheduled as a Hospital Follow-Up? No    4.  Immunizations were updated in Epic using WebIZ?: No WebIZ record       •  Web Iz Recommendations:     5.  Patient is due for the following Health Maintenance Topics:   Health Maintenance Due   Topic Date Due   • URINE DRUG SCREEN  1965   • IMM PNEUMOCOCCAL 19-64 (ADULT) MEDIUM RISK SERIES (1 of 1 - PPSV23) 05/17/1984   • COLONOSCOPY  05/17/2015   • PAP SMEAR  07/09/2016           6.  Patient was NOT informed to arrive 15 min prior to their scheduled appointment and bring in their medication bottles.

## 2017-07-26 NOTE — TELEPHONE ENCOUNTER
Phone Number Called: [phone] (home) [phone] (work)    Message: LVM for patient to return call regarding her results.    Left Message for patient to call back: yes

## 2017-07-26 NOTE — MR AVS SNAPSHOT
"        Karine Ovalle   2017 10:20 AM   Office Visit   MRN: [id number]    Department:  [address] [place] [address]   Dept Phone:  [phone]    Description:  Female : 1965   Provider:  Micky Rubin M.D.           Reason for Visit     Knee Pain FMLA Paperwork    Ankle Pain right - x 2 months      Allergies as of 2017     No Known Allergies      You were diagnosed with     Administrative encounter   [531668]       Acute right hip pain   [146005]       Acute low back pain due to trauma   [012626]       Sprain of right ankle, unspecified ligament, initial encounter   [9382003]       Swelling of ankle, right   [476430]       Primary osteoarthritis of right knee   [860949]       Lesion of soft tissue of lower leg and ankle   [871683]       Severe obesity (BMI >= 40) (CMS-HCC)   [583348]       Chronic pain syndrome   [338.4.ICD-9-CM]       Chronic use of opiate for therapeutic purpose   [0069857]       Mild intermittent asthma without complication   [249026]         Vital Signs     Blood Pressure Pulse Temperature Height Weight Body Mass Index    128/76 mmHg 114 37 °C (98.6 °F) 1.581 m (5' 2.24\") 102.604 kg (226 lb 3.2 oz) 41.05 kg/m2    Oxygen Saturation Last Menstrual Period Smoking Status             97% 2016 Never Smoker          Basic Information     Date Of Birth Sex Race Ethnicity Preferred Language    1965 Female Black or  Non- English      Your appointments     2017  3:40 PM   MA SCRN10 with GREGOR OLIVER MG 1   [facility] ([place])    [address]  [place] [address] [phone]           No deodorant, powder, perfume or lotion under the arm or breast area.            Aug 15, 2017  3:00 PM   New Patient with Malissa Barrera M.D.   [facility] ([address], [address]  [place] [address]   [phone]            Sep 12, 2017  3:20 PM   Established " Patient with Micky Rubin M.D.   [facility] [address] ([facility])    [address]  [place] [address] [phone]           You will be receiving a confirmation call a few days before your appointment from our automated call confirmation system.              Problem List              ICD-10-CM Priority Class Noted - Resolved    Mild intermittent asthma without complication J45.20 High  4/25/2012 - Present    Fibroids D25.9   8/13/2013 - Present    Primary osteoarthritis of right knee- sp right TKR 2015 - dr nowak M17.11   8/13/2013 - Present    Weight gain status post gastric bypass- in [place] 2002; was 320 lbs- got down to 155 R63.5   8/12/2014 - Present    Vitamin B 12 deficiency E53.8   6/2/2016 - Present    Severe obesity (BMI >= 40) (CMS-HCC) E66.01   10/21/2016 - Present    Chronic pain syndrome- [place] adv pain, dr sanders G89.4   6/8/2017 - Present    Chronic use of opiate for therapeutic purpose Z79.891   6/8/2017 - Present    Lesion of soft tissue of lower leg and ankle M79.89   7/26/2017 - Present    Sprain of right ankle S93.401A   7/26/2017 - Present    Swelling of ankle M25.473   7/26/2017 - Present    Administrative encounter- FMLA paperwork fill out with pt asst Z02.9   7/26/2017 - Present    Acute right hip pain- traumatic M25.551   7/26/2017 - Present    Acute low back pain due to trauma M54.5   7/26/2017 - Present      Health Maintenance        Date Due Completion Dates    IMM PNEUMOCOCCAL 19-64 (ADULT) MEDIUM RISK SERIES (1 of 1 - PPSV23) 5/17/1984 ---    COLONOSCOPY 5/17/2015 ---    PAP SMEAR 7/9/2016 7/9/2013, 10/14/2011, 8/10/2010    IMM INFLUENZA (1) 9/1/2017 ---    MAMMOGRAM 6/30/2018 6/30/2016 (Done), 8/19/2014, 7/26/2013, 7/6/2012, 8/20/2010, 8/20/2010, 9/30/2008, 9/30/2008, 10/25/2007, 10/25/2007    Override on 6/30/2016: Done    IMM DTaP/Tdap/Td Vaccine (2 - Td) 5/7/2022 5/7/2012            Current Immunizations     Tdap Vaccine 5/7/2012      Below and/or  attached are the medications your provider expects you to take. Review all of your home medications and newly ordered medications with your provider and/or pharmacist. Follow medication instructions as directed by your provider and/or pharmacist. Please keep your medication list with you and share with your provider. Update the information when medications are discontinued, doses are changed, or new medications (including over-the-counter products) are added; and carry medication information at all times in the event of emergency situations     Allergies:  No Known Allergies          Medications  Valid as of: July 26, 2017 -  4:30 PM    Generic Name Brand Name Tablet Size Instructions for use    Cholecalciferol (Tab) cholecalciferol 1000 UNIT Take 1,000 Units by mouth every day.        Cyanocobalamin (Tab) VITAMIN B12 1000 MCG Take 1 Tab by mouth every day.        Furosemide (Tab) LASIX 20 MG TAKE ONE TABLET BY MOUTH ONCE DAILY        Lidocaine HCl (Cream) Lidocaine HCl 3 % Apply cream to affected area 2 times daily as needed        Meloxicam (Tab) MOBIC 15 MG 1 tab po qday with food for djd        Multiple Vitamin (Tab) THERAGRAN  Take 1 Tab by mouth every day.        Olopatadine HCl (Solution) PATANOL 0.1 % Place 1 Drop in both eyes 2 times a day.        Omega-3 Fatty Acids (Cap) fish oil 1000 MG Take 3,000 mg by mouth every day.        OxyCODONE HCl (Tab) ROXICODONE 5 MG Take 1 Tab by mouth every 8 hours as needed for Severe Pain.        Phentermine HCl (Cap) phentermine 37.5 MG Take 1 Cap by mouth every morning.        Potassium Chloride (Tab CR) KLOR-CON 10 MEQ TAKE ONE TABLET BY MOUTH ONCE DAILY        .                 Medicines prescribed today were sent to:     [facility] [address] [place] [address] (S), [place] - [address] [place] [id number] [facility] (S) [place] [address]    Phone: [phone] Fax: [phone]    Open 24 Hours?: No      Medication refill instructions:       If your prescription bottle indicates you have  medication refills left, it is not necessary to call your provider’s office. Please contact your pharmacy and they will refill your medication.    If your prescription bottle indicates you do not have any refills left, you may request refills at any time through one of the following ways: The online [employer] system (except Urgent Care), by calling your provider’s office, or by asking your pharmacy to contact your provider’s office with a refill request. Medication refills are processed only during regular business hours and may not be available until the next business day. Your provider may request additional information or to have a follow-up visit with you prior to refilling your medication.   *Please Note: Medication refills are assigned a new Rx number when refilled electronically. Your pharmacy may indicate that no refills were authorized even though a new prescription for the same medication is available at the pharmacy. Please request the medicine by name with the pharmacy before contacting your provider for a refill.        Your To Do List     Future Labs/Procedures Complete By Expires    DX-ANKLE 3+ VIEWS RIGHT  As directed 7/26/2018    LE VENOUS DUPLEX  As directed 1/25/2018      Referral     A referral request has been sent to our patient care coordination department. Please allow 3-5 business days for us to process this request and contact you either by phone or mail. If you do not hear from us by the 5th business day, please call us at [phone].           [employer] Access Code: Activation code not generated  Current [employer] Status: Active

## 2017-07-26 NOTE — TELEPHONE ENCOUNTER
----- Message from Micky Rubin M.D. sent at 7/26/2017 12:19 PM PDT -----  Possible lesion of ankle; needs to see ortho- referred

## 2017-07-27 ENCOUNTER — HOSPITAL ENCOUNTER (OUTPATIENT)
Dept: RADIOLOGY | Facility: MEDICAL CENTER | Age: 52
End: 2017-07-27
Attending: INTERNAL MEDICINE
Payer: COMMERCIAL

## 2017-07-27 DIAGNOSIS — Z12.31 ENCOUNTER FOR SCREENING MAMMOGRAM FOR BREAST CANCER: ICD-10-CM

## 2017-07-27 PROCEDURE — 77063 BREAST TOMOSYNTHESIS BI: CPT

## 2017-07-27 NOTE — TELEPHONE ENCOUNTER
Phone Number Called: [phone] (home)     Message: Left 2nd VM for patient to call back, letter mailed.    Left Message for patient to call back: yes

## 2017-07-27 NOTE — PROGRESS NOTES
Subjective:      Karine Ovalle is a 52 y.o. female who presents with Knee Pain and Ankle Pain    The patient is here primarily for FMLA paperwork because of recent right ankle sprain and right hip and low back strain/contusion which she sustained a few months ago.       No x-rays were done. The right foot and ankle and now swollen a lot and she is worried about a possible blood clot. Doppler ultrasound of the legs were negative prior to the injury a few months ago.    Also,  The patient is here for followup of chronic medical problems listed below. The patient is compliant with medications and having no side effects from them. Denies chest pain, abdominal pain, dyspnea, myalgias, or cough.   Patient Active Problem List    Diagnosis Date Noted   • Mild intermittent asthma without complication 04/25/2012     Priority: High   • Lesion of soft tissue of lower leg and ankle 07/26/2017   • Sprain of right ankle 07/26/2017   • Swelling of ankle 07/26/2017   • Administrative encounter- FMLA paperwork fill out with pt asst 07/26/2017   • Acute right hip pain- traumatic 07/26/2017   • Acute low back pain due to trauma 07/26/2017   • Chronic pain syndrome- nv adv pain, dr sanders 06/08/2017   • Chronic use of opiate for therapeutic purpose 06/08/2017   • Severe obesity (BMI >= 40) (CMS-HCC) 10/21/2016   • Vitamin B 12 deficiency 06/02/2016   • Weight gain status post gastric bypass- in [place] 2002; was 320 lbs- got down to 155 08/12/2014   • Fibroids 08/13/2013   • Primary osteoarthritis of right knee- sp right TKR 2015 - dr nowak 08/13/2013     No Known Allergies  Outpatient Prescriptions Prior to Visit   Medication Sig Dispense Refill   • olopatadine (PATANOL) 0.1 % ophthalmic solution Place 1 Drop in both eyes 2 times a day. 5 mL 11   • oxycodone immediate-release (ROXICODONE) 5 MG Tab Take 1 Tab by mouth every 8 hours as needed for Severe Pain. 90 Tab 0   • phentermine 37.5 MG capsule Take 1 Cap by mouth every  "morning. 90 Cap 0   • cyanocobalamin (VITAMIN B12) 1000 MCG Tab Take 1 Tab by mouth every day. 90 Tab 4   • meloxicam (MOBIC) 15 MG tablet 1 tab po qday with food for djd 90 Tab 1   • furosemide (LASIX) 20 MG Tab TAKE ONE TABLET BY MOUTH ONCE DAILY 90 Tab 4   • potassium chloride ER (KLOR-CON) 10 MEQ tablet TAKE ONE TABLET BY MOUTH ONCE DAILY 90 Tab 4   • Omega-3 Fatty Acids (FISH OIL) 1000 MG Cap capsule Take 3,000 mg by mouth every day.     • multivitamin (THERAGRAN) Tab Take 1 Tab by mouth every day.     • vitamin D (CHOLECALCIFEROL) 1000 UNIT Tab Take 1,000 Units by mouth every day.     • Lidocaine HCl 3 % Cream Apply cream to affected area 2 times daily as needed (Patient not taking: Reported on 7/26/2017) 1 Tube 0     No facility-administered medications prior to visit.                       Knee Pain    Ankle Pain         Review of Systems   Constitutional: Negative.    HENT: Negative.    Eyes: Negative.    Respiratory: Negative.    Cardiovascular: Negative.    Gastrointestinal: Negative.    Genitourinary: Negative.    Musculoskeletal: Negative.    Skin: Negative.    Neurological: Negative.    Endo/Heme/Allergies: Negative.    Psychiatric/Behavioral: Negative.           Objective:     /76 mmHg  Pulse 114  Temp(Src) 37 °C (98.6 °F)  Ht 1.581 m (5' 2.24\")  Wt 102.604 kg (226 lb 3.2 oz)  BMI 41.05 kg/m2  SpO2 97%  LMP 01/02/2016     Physical Exam   Constitutional: She is oriented to person, place, and time. She appears well-developed and well-nourished.   HENT:   Head: Normocephalic and atraumatic.   Right Ear: External ear normal.   Left Ear: External ear normal.   Nose: Nose normal.   Mouth/Throat: Oropharynx is clear and moist.   Eyes: Conjunctivae and EOM are normal. Pupils are equal, round, and reactive to light. Right eye exhibits no discharge. Left eye exhibits no discharge. No scleral icterus.   Neck: Normal range of motion. Neck supple. No JVD present. No tracheal deviation present. No " thyromegaly present.   Cardiovascular: Normal rate, regular rhythm, normal heart sounds and intact distal pulses.  Exam reveals no gallop and no friction rub.    Pulmonary/Chest: Effort normal and breath sounds normal. No stridor. No respiratory distress. She has no wheezes. She has no rales. She exhibits no tenderness.   Abdominal: Soft. Bowel sounds are normal. She exhibits no distension and no mass. There is no tenderness. There is no rebound and no guarding. No hernia.   Musculoskeletal: Normal range of motion. She exhibits no edema or tenderness.   Lymphadenopathy:     She has no cervical adenopathy.   Neurological: She is alert and oriented to person, place, and time. She has normal reflexes. She displays normal reflexes. No cranial nerve deficit. Coordination normal.   Skin: Skin is warm and dry. No rash noted. No erythema. No pallor.   Psychiatric: She has a normal mood and affect. Her behavior is normal. Judgment and thought content normal.   Nursing note and vitals reviewed.  No visits with results within 1 Month(s) from this visit.  Latest known visit with results is:    Hospital Outpatient Visit on 06/12/2017   Component Date Value   • Glycohemoglobin 06/12/2017 5.3    • Est Avg Glucose 06/12/2017 105       Lab Results   Component Value Date/Time    GLYCOHEMOGLOBIN 5.3 06/12/2017 09:03 AM     Lab Results   Component Value Date/Time    SODIUM 134* 06/12/2017 09:02 AM    POTASSIUM 3.9 06/12/2017 09:02 AM    CHLORIDE 102 06/12/2017 09:02 AM    CO2 26 06/12/2017 09:02 AM    GLUCOSE 96 06/12/2017 09:02 AM    BUN 11 06/12/2017 09:02 AM    CREATININE 0.75 06/12/2017 09:02 AM    BUN-CREATININE RATIO 13 08/14/2010 12:00 AM    GLOM FILT RATE, EST >59 08/14/2010 12:00 AM    ALKALINE PHOSPHATASE 100* 06/12/2017 09:02 AM    AST(SGOT) 18 06/12/2017 09:02 AM    ALT(SGPT) 10 06/12/2017 09:02 AM    TOTAL BILIRUBIN 0.7 06/12/2017 09:02 AM     Lab Results   Component Value Date/Time    INR 1.04 05/18/2012 12:20 PM     Lab  Results   Component Value Date/Time    CHOLESTEROL, 06/12/2017 09:02 AM    LDL 63 06/12/2017 09:02 AM    HDL 69 06/12/2017 09:02 AM    TRIGLYCERIDES 60 06/12/2017 09:02 AM       No results found for: TESTOSTERONE  Lab Results   Component Value Date/Time    TSH 2.110 08/14/2010 12:00 AM     Lab Results   Component Value Date/Time    FREE T-4 0.72 05/03/2016 07:32 AM     Lab Results   Component Value Date/Time    URIC ACID 5.6 08/12/2014 04:49 PM     No components found for: VITB12  Lab Results   Component Value Date/Time    25-HYDROXY   VITAMIN D 25 33 08/12/2014 04:49 PM    25-HYDROXY   VITAMIN D 25 25* 07/03/2012 09:20 AM           X-ray done today of right ankle:    Impression        Degenerative changes as above described.    Mild lucency in the medial aspect of the talar dome could be related to an osteochondral lesion.    Soft tissue swelling.    Calcaneal spurring                    Assessment/Plan:     1. Administrative encounter- [facility] paperwork fill out with pt asst patient will be unable to work next few months due to her severe right ankle sprain and swelling and right hip pain.       2. Acute right hip pain- traumatic -rest and ice anti-inflammatories when necessary       3. Acute low back pain due to trauma-as above       4. Sprain of right ankle, unspecified ligament, initial encounter-as above. Rule out DVT. Rule out fracture. X-ray of the right ankle was reviewed by the radiologist and his report indicated possible  Mild lucency in the medial aspect of the talar dome could be related to an osteochondral lesion. Referred to orthopedics for further evaluation. Rule out occult fracture. Rule out DVT with Doppler ultrasound     - DX-ANKLE 3+ VIEWS RIGHT; Future  - LE VENOUS DUPLEX; Future  - REFERRAL TO ORTHOPEDICS    5. Swelling of ankle, right-as above.     - LE VENOUS DUPLEX; Future  - REFERRAL TO ORTHOPEDICS    6. Primary osteoarthritis of right knee- sp right TKR 2015 - dr nowak Under good  control. Continue same regimen.      7. Lesion of soft tissue of lower leg and ankle-as above    Mild lucency in the medial aspect of the talar dome could be related to an osteochondral lesion  - REFERRAL TO ORTHOPEDICS    8. Severe obesity (BMI >= 40) (CMS-[facility])   diet/exercise/lose 15 lbs.; patient counseled          9. Chronic pain syndrome- nv adv pain, dr sanders Under good control. Continue same regimen.    10. Chronic use of opiate for therapeutic purpose    Under good control. Continue same regimen.  11. Mild intermittent asthma without complication Under good control. Continue same regimen.           40 minute face-to-face encounter took place today.  More than half of this time was spent in the coordination of care of the above problems, as well as counseling.

## 2017-08-07 ENCOUNTER — APPOINTMENT (OUTPATIENT)
Dept: RADIOLOGY | Facility: MEDICAL CENTER | Age: 52
End: 2017-08-07
Attending: EMERGENCY MEDICINE
Payer: COMMERCIAL

## 2017-08-07 ENCOUNTER — HOSPITAL ENCOUNTER (EMERGENCY)
Facility: MEDICAL CENTER | Age: 52
End: 2017-08-08
Attending: EMERGENCY MEDICINE
Payer: COMMERCIAL

## 2017-08-07 ENCOUNTER — TELEPHONE (OUTPATIENT)
Dept: MEDICAL GROUP | Age: 52
End: 2017-08-07

## 2017-08-07 DIAGNOSIS — L03.115 CELLULITIS OF RIGHT LEG: ICD-10-CM

## 2017-08-07 DIAGNOSIS — R60.0 EDEMA OF RIGHT LOWER EXTREMITY: ICD-10-CM

## 2017-08-07 DIAGNOSIS — M79.661 PAIN AND SWELLING OF RIGHT LOWER LEG: ICD-10-CM

## 2017-08-07 DIAGNOSIS — M79.89 PAIN AND SWELLING OF RIGHT LOWER LEG: ICD-10-CM

## 2017-08-07 PROBLEM — Q27.8: Status: ACTIVE | Noted: 2017-08-07

## 2017-08-07 PROBLEM — R93.89 ABNORMAL FINDING ON ULTRASOUND: Status: ACTIVE | Noted: 2017-08-07

## 2017-08-07 LAB — EKG IMPRESSION: NORMAL

## 2017-08-07 PROCEDURE — 93005 ELECTROCARDIOGRAM TRACING: CPT

## 2017-08-07 PROCEDURE — 700117 HCHG RX CONTRAST REV CODE 255: Performed by: EMERGENCY MEDICINE

## 2017-08-07 PROCEDURE — 93971 EXTREMITY STUDY: CPT

## 2017-08-07 PROCEDURE — 73701 CT LOWER EXTREMITY W/DYE: CPT | Mod: RT

## 2017-08-07 PROCEDURE — 99284 EMERGENCY DEPT VISIT MOD MDM: CPT

## 2017-08-07 RX ORDER — CEPHALEXIN 500 MG/1
500 CAPSULE ORAL 4 TIMES DAILY
Qty: 40 CAP | Refills: 0 | Status: SHIPPED | OUTPATIENT
Start: 2017-08-07 | End: 2017-08-08 | Stop reason: SDUPTHER

## 2017-08-07 RX ORDER — SULFAMETHOXAZOLE AND TRIMETHOPRIM 800; 160 MG/1; MG/1
1 TABLET ORAL EVERY 12 HOURS
Qty: 20 TAB | Refills: 0 | Status: SHIPPED | OUTPATIENT
Start: 2017-08-07 | End: 2017-08-08 | Stop reason: SDUPTHER

## 2017-08-07 RX ORDER — SULFAMETHOXAZOLE AND TRIMETHOPRIM 800; 160 MG/1; MG/1
1 TABLET ORAL ONCE
Status: COMPLETED | OUTPATIENT
Start: 2017-08-08 | End: 2017-08-08

## 2017-08-07 RX ORDER — CEPHALEXIN 500 MG/1
500 CAPSULE ORAL ONCE
Status: COMPLETED | OUTPATIENT
Start: 2017-08-08 | End: 2017-08-08

## 2017-08-07 RX ADMIN — IOHEXOL 100 ML: 350 INJECTION, SOLUTION INTRAVENOUS at 23:08

## 2017-08-07 ASSESSMENT — LIFESTYLE VARIABLES: DO YOU DRINK ALCOHOL: NO

## 2017-08-07 ASSESSMENT — PAIN SCALES - GENERAL: PAINLEVEL_OUTOF10: 7

## 2017-08-07 NOTE — TELEPHONE ENCOUNTER
----- Message from Michael J Bloch, M.D. sent at 8/7/2017 11:08 AM PDT -----  Regarding: RE: urgent request for appointment  She should get the ultrasound that her PCP ordered and follow up with her PCP right away.    If her PCP wants a referral after that, we can arrange it.    I have copied her PCP on this note.    bloch  ----- Message -----     From: Vaibhav Gonzalez     Sent: 8/7/2017   9:55 AM       To: Michael J Bloch, M.D.  Subject: urgent request for appointment                   Hi Dr Bloch,    This patient states that she feels like she has a DVT.  She has a hard mass in her leg, she has had imaging previously in June.  Her insurance requires a referral I asked that she contact her PCP to get that in place.  But she would like to know if she can be seen urgently once her referral is authorized, she would like if you could review her chart and approve a sooner appointment.    Thank you,  Vaibhav

## 2017-08-07 NOTE — ED AVS SNAPSHOT
[employer] Access Code: Activation code not generated  Current [employer] Status: Active    [employer]  A secure, online tool to manage your health information     [employer]’s [employer]® is a secure, online tool that connects you to your personalized health information from the privacy of your home -- day or night - making it very easy for you to manage your healthcare. Once the activation process is completed, you can even access your medical information using the [employer] medardo, which is available for free in the Apple Medardo store or Google Play store.     [employer] provides the following levels of access (as shown below):   My Chart Features   [facility] Primary Care Doctor [facility]  Specialists [facility]  Urgent  Care Non-[facility]  Primary Care  Doctor   Email your healthcare team securely and privately 24/7 X X X X   Manage appointments: schedule your next appointment; view details of past/upcoming appointments X      Request prescription refills. X      View recent personal medical records, including lab and immunizations X X X X   View health record, including health history, allergies, medications X X X X   Read reports about your outpatient visits, procedures, consult and ER notes X X X X   See your discharge summary, which is a recap of your hospital and/or ER visit that includes your diagnosis, lab results, and care plan. X X       How to register for [employer]:  1. Go to  [email or web address].  2. Click on the Sign Up Now box, which takes you to the New Member Sign Up page. You will need to provide the following information:  a. Enter your [employer] Access Code exactly as it appears at the top of this page. (You will not need to use this code after you’ve completed the sign-up process. If you do not sign up before the expiration date, you must request a new code.)   b. Enter your date of birth.   c. Enter your home email address.   d. Click Submit, and follow the next screen’s instructions.  3. Create a [employer] ID. This will  be your [employer] login ID and cannot be changed, so think of one that is secure and easy to remember.  4. Create a [employer] password. You can change your password at any time.  5. Enter your Password Reset Question and Answer. This can be used at a later time if you forget your password.   6. Enter your e-mail address. This allows you to receive e-mail notifications when new information is available in [employer].  7. Click Sign Up. You can now view your health information.    For assistance activating your [employer] account, call [phone]

## 2017-08-07 NOTE — ED NOTES
Pt c/o sob, dizziness.  Pt also c/o some chest discomfort last week which she attributed to anxiety.

## 2017-08-07 NOTE — ED AVS SNAPSHOT
Home Care Instructions                                                                                                                Karine Ovalle   MRN: [id number]    Department:  [facility], Emergency Dept   Date of Visit:  8/7/2017            [facility], Emergency Dept    [address]    [place] [address]    Phone:  [phone]      You were seen by     1. Ramone Pettit D.O.    2. Seamus Edgar M.D.      Your Diagnosis Was     Edema of right lower extremity     R60.0       These are the medications you received during your hospitalization from 08/07/2017 1536 to 08/08/2017 0023     Date/Time Order Dose Route Action    08/07/2017 2308 iohexol (OMNIPAQUE) 350 mg/mL 100 mL Intravenous Given    08/08/2017 0007 sulfamethoxazole-trimethoprim (BACTRIM DS) 800-160 MG tablet 1 Tab 1 Tab Oral Given    08/08/2017 0006 cephALEXin (KEFLEX) capsule 500 mg 500 mg Oral Given      Follow-up Information     1. Follow up with Micky Rubin M.D.. Schedule an appointment as soon as possible for a visit in 3 days.    Specialty:  Internal Medicine    Why:  For wound re-check    Contact information    [address]  [place] [address] [phone]        Medication Information     Review all of your home medications and newly ordered medications with your primary doctor and/or pharmacist as soon as possible. Follow medication instructions as directed by your doctor and/or pharmacist.     Please keep your complete medication list with you and share with your physician. Update the information when medications are discontinued, doses are changed, or new medications (including over-the-counter products) are added; and carry medication information at all times in the event of emergency situations.               Medication List      START taking these medications        Instructions    Morning Afternoon Evening Bedtime    cephALEXin 500 MG Caps   Last time this was  given:  500 mg on 8/8/2017 12:06 AM   Commonly known as:  KEFLEX        Take 1 Cap by mouth 4 times a day.   Dose:  500 mg                        sulfamethoxazole-trimethoprim 800-160 MG tablet   Last time this was given:  1 Tab on 8/8/2017 12:07 AM   Commonly known as:  BACTRIM DS        Take 1 Tab by mouth every 12 hours for 10 days.   Dose:  1 Tab                          ASK your doctor about these medications        Instructions    Morning Afternoon Evening Bedtime    cyanocobalamin 1000 MCG Tabs   Commonly known as:  VITAMIN B12        Take 1 Tab by mouth every day.   Dose:  1000 mcg                        fish oil 1000 MG Caps capsule        Take 3,000 mg by mouth every day.   Dose:  3000 mg                        furosemide 20 MG Tabs   Commonly known as:  LASIX        TAKE ONE TABLET BY MOUTH ONCE DAILY                        Lidocaine HCl 3 % Crea        Apply cream to affected area 2 times daily as needed                        meloxicam 15 MG tablet   Commonly known as:  MOBIC        1 tab po qday with food for djd                        multivitamin Tabs        Take 1 Tab by mouth every day.   Dose:  1 Tab                        olopatadine 0.1 % ophthalmic solution   Commonly known as:  PATANOL        Place 1 Drop in both eyes 2 times a day.   Dose:  1 Drop                        oxycodone immediate-release 5 MG Tabs   Commonly known as:  ROXICODONE        Doctor's comments:  Nv adv pain, dr sanders   Take 1 Tab by mouth every 8 hours as needed for Severe Pain.   Dose:  5 mg                        phentermine 37.5 MG capsule        Take 1 Cap by mouth every morning.   Dose:  37.5 mg                        potassium chloride ER 10 MEQ tablet   Commonly known as:  KLOR-CON        TAKE ONE TABLET BY MOUTH ONCE DAILY                        vitamin D 1000 UNIT Tabs   Commonly known as:  cholecalciferol        Take 1,000 Units by mouth every day.   Dose:  1000 Units                             Where to Get  Your Medications      You can get these medications from any pharmacy     Bring a paper prescription for each of these medications    - cephALEXin 500 MG Caps  - sulfamethoxazole-trimethoprim 800-160 MG tablet            Procedures and tests performed during your visit     CONSENT FOR CONTRAST INJECTION    CT-EXTREMITY, LOWER WITH RIGHT    EKG (ER)    IV Saline Lock    LE VENOUS DUPLEX (Specify in Comments Left, Right Or Bilateral)    OLD EKG        Discharge Instructions       Return if you have increasing redness, fever, pus or not able to bear weight on your leg.   Cellulitis  Cellulitis is an infection of the skin and the tissue under the skin. The infected area is usually red and tender. This happens most often in the arms and lower legs.  HOME CARE   · Take your antibiotic medicine as told. Finish the medicine even if you start to feel better.  · Keep the infected arm or leg raised (elevated).  · Put a warm cloth on the area up to 4 times per day.  · Only take medicines as told by your doctor.  · Keep all doctor visits as told.  GET HELP IF:  · You see red streaks on the skin coming from the infected area.  · Your red area gets bigger or turns a dark color.  · Your bone or joint under the infected area is painful after the skin heals.  · Your infection comes back in the same area or different area.  · You have a puffy (swollen) bump in the infected area.  · You have new symptoms.  · You have a fever.  GET HELP RIGHT AWAY IF:   · You feel very sleepy.  · You throw up (vomit) or have watery poop (diarrhea).  · You feel sick and have muscle aches and pains.  MAKE SURE YOU:   · Understand these instructions.  · Will watch your condition.  · Will get help right away if you are not doing well or get worse.     This information is not intended to replace advice given to you by your health care provider. Make sure you discuss any questions you have with your health care provider.     Document Released: 06/05/2009  Document Revised: 01/08/2016 Document Reviewed: 03/04/2013  [employer] Interactive Patient Education ©2016 [employer]      Peripheral Edema  You have swelling in your legs (peripheral edema). This swelling is due to excess accumulation of salt and water in your body. Edema may be a sign of heart, kidney or liver disease, or a side effect of a medication. It may also be due to problems in the leg veins. Elevating your legs and using special support stockings may be very helpful, if the cause of the swelling is due to poor venous circulation. Avoid long periods of standing, whatever the cause.  Treatment of edema depends on identifying the cause. Chips, pretzels, pickles and other salty foods should be avoided. Restricting salt in your diet is almost always needed. Water pills (diuretics) are often used to remove the excess salt and water from your body via urine. These medicines prevent the kidney from reabsorbing sodium. This increases urine flow.  Diuretic treatment may also result in lowering of potassium levels in your body. Potassium supplements may be needed if you have to use diuretics daily. Daily weights can help you keep track of your progress in clearing your edema. You should call your caregiver for follow up care as recommended.  SEEK IMMEDIATE MEDICAL CARE IF:   · You have increased swelling, pain, redness, or heat in your legs.  · You develop shortness of breath, especially when lying down.  · You develop chest or abdominal pain, weakness, or fainting.  · You have a fever.     This information is not intended to replace advice given to you by your health care provider. Make sure you discuss any questions you have with your health care provider.     Document Released: 01/25/2006 Document Revised: 03/11/2013 Document Reviewed: 01/05/2011  [employer] Interactive Patient Education ©2016 [employer] Inc.            Patient Information     Patient Information    Following emergency treatment: all patient  requiring follow-up care must return either to a private physician or a clinic if your condition worsens before you are able to obtain further medical attention, please return to the emergency room.     Billing Information    At [facility], we work to make the billing process streamlined for our patients.  Our Representatives are here to answer any questions you may have regarding your hospital bill.  If you have insurance coverage and have supplied your insurance information to us, we will submit a claim to your insurer on your behalf.  Should you have any questions regarding your bill, we can be reached online or by phone as follows:  Online: You are able pay your bills online or live chat with our representatives about any billing questions you may have. We are here to help Monday - Friday from 8:00am to 7:30pm and 9:00am - 12:00pm on Saturdays.  Please visit [email or web address]  for more information.   Phone:  [phone] or [phone]    Please note that your emergency physician, surgeon, pathologist, radiologist, anesthesiologist, and other specialists are not employed by [facility] [place] and will therefore bill separately for their services.  Please contact them directly for any questions concerning their bills at the numbers below:     Emergency Physician Services:  [phone]  [facility]:  [phone]  [facility]:  [phone]  [facility]:  [phone]    1. Your final bill may vary from the amount quoted upon discharge if all procedures are not complete at that time, or if your doctor has additional procedures of which we are not aware. You will receive an additional bill if you return to the Emergency Department at [facility] for suture removal regardless of the facility of which the sutures were placed.     2. Please arrange for settlement of this account at the emergency registration.    3. All self-pay accounts  are due in full at the time of treatment.  If you are unable to meet this obligation then payment is expected within 4-5 days.     4. If you have had radiology studies (CT, X-ray, Ultrasound, MRI), you have received a preliminary result during your emergency department visit. Please contact the radiology department [phone] to receive a copy of your final result. Please discuss the Final result with your primary physician or with the follow up physician provided.     Crisis Hotline:  [place] Crisis Hotline:  8-680-TIRCVSG or 1-366.904.1498  [place] Crisis Hotline:    1-759.265.4969 or 192-306-3703         ED Discharge Follow Up Questions    1. In order to provide you with very good care, we would like to follow up with a phone call in the next few days.  May we have your permission to contact you?     YES /  NO    2. What is the best phone number to call you? (       )_____-__________    3. What is the best time to call you?      Morning  /  Afternoon  /  Evening                   Patient Signature:  ____________________________________________________________    Date:  ____________________________________________________________      Your appointments     Aug 15, 2017  3:00 PM   New Patient with Malissa Barrera M.D.   [facility] ([address])    [address] [place] [address], [address]  [facility] [address] [phone]            Sep 12, 2017  3:20 PM   Established Patient with Micky Rubin M.D.   [facility] [address] [place] [address] ([facility])    [address]  [facility] [address] [phone]           You will be receiving a confirmation call a few days before your appointment from our automated call confirmation system.

## 2017-08-07 NOTE — ED NOTES
Amb to triage, sent by her PMD to r/o a dvt.  Pt c/o RLE pain & swelling since June.  Pt had an US of that leg in June which was negative for DVT.  Pt states symptoms are worsening.  C/o RLE tender and warm to touch.

## 2017-08-07 NOTE — ED AVS SNAPSHOT
8/8/2017    Karine Ovalle  [address]  [place] [address]    Dear Karine:    [facility] wants to ensure your discharge home is safe and you or your loved ones have had all of your questions answered regarding your care after you leave the hospital.    Below is a list of resources and contact information should you have any questions regarding your hospital stay, follow-up instructions, or active medical symptoms.    Questions or Concerns Regarding… Contact   Medical Questions Related to Your Discharge  (7 days a week, 8am-5pm) Contact a Nurse Care Coordinator   [phone]   Medical Questions Not Related to Your Discharge  (24 hours a day / 7 days a week)  Contact the Nurse Health Line   [phone]    Medications or Discharge Instructions Refer to your discharge packet   or contact your [facility] Primary Care Provider   [phone]   Follow-up Appointment(s) Schedule your appointment via [employer]   or contact Scheduling [phone]   Billing Review your statement via [employer]  or contact Billing [phone]   Medical Records Review your records via [employer]   or contact Medical Records [phone]     You may receive a telephone call within two days of discharge. This call is to make certain you understand your discharge instructions and have the opportunity to have any questions answered. You can also easily access your medical information, test results and upcoming appointments via the [employer] free online health management tool. You can learn more and sign up at [employer]. For assistance setting up your [employer] account, please call [phone].    Once again, we want to ensure your discharge home is safe and that you have a clear understanding of any next steps in your care. If you have any questions or concerns, please do not hesitate to contact us, we are here for you. Thank you for choosing [facility] for your healthcare needs.    Sincerely,    Your [facility] Healthcare Team

## 2017-08-08 ENCOUNTER — TELEPHONE (OUTPATIENT)
Dept: MEDICAL GROUP | Age: 52
End: 2017-08-08

## 2017-08-08 ENCOUNTER — PATIENT MESSAGE (OUTPATIENT)
Dept: MEDICAL GROUP | Age: 52
End: 2017-08-08

## 2017-08-08 ENCOUNTER — TELEPHONE (OUTPATIENT)
Dept: PHARMACY | Facility: MEDICAL CENTER | Age: 52
End: 2017-08-08

## 2017-08-08 VITALS
HEIGHT: 63 IN | BODY MASS INDEX: 40.43 KG/M2 | DIASTOLIC BLOOD PRESSURE: 92 MMHG | HEART RATE: 98 BPM | SYSTOLIC BLOOD PRESSURE: 145 MMHG | OXYGEN SATURATION: 96 % | RESPIRATION RATE: 18 BRPM | TEMPERATURE: 98.1 F | WEIGHT: 228.18 LBS

## 2017-08-08 PROCEDURE — A9270 NON-COVERED ITEM OR SERVICE: HCPCS | Performed by: EMERGENCY MEDICINE

## 2017-08-08 PROCEDURE — 700102 HCHG RX REV CODE 250 W/ 637 OVERRIDE(OP): Performed by: EMERGENCY MEDICINE

## 2017-08-08 RX ORDER — SULFAMETHOXAZOLE AND TRIMETHOPRIM 800; 160 MG/1; MG/1
1 TABLET ORAL EVERY 12 HOURS
Qty: 20 TAB | Refills: 0 | Status: SHIPPED | OUTPATIENT
Start: 2017-08-08 | End: 2017-08-17 | Stop reason: SDUPTHER

## 2017-08-08 RX ORDER — CEPHALEXIN 500 MG/1
500 CAPSULE ORAL 4 TIMES DAILY
Qty: 40 CAP | Refills: 0 | Status: SHIPPED | OUTPATIENT
Start: 2017-08-08 | End: 2017-08-17 | Stop reason: SDUPTHER

## 2017-08-08 RX ADMIN — CEPHALEXIN 500 MG: 500 CAPSULE ORAL at 00:06

## 2017-08-08 RX ADMIN — SULFAMETHOXAZOLE AND TRIMETHOPRIM 1 TABLET: 800; 160 TABLET ORAL at 00:07

## 2017-08-08 NOTE — DISCHARGE INSTRUCTIONS
Return if you have increasing redness, fever, pus or not able to bear weight on your leg.   Cellulitis  Cellulitis is an infection of the skin and the tissue under the skin. The infected area is usually red and tender. This happens most often in the arms and lower legs.  HOME CARE   · Take your antibiotic medicine as told. Finish the medicine even if you start to feel better.  · Keep the infected arm or leg raised (elevated).  · Put a warm cloth on the area up to 4 times per day.  · Only take medicines as told by your doctor.  · Keep all doctor visits as told.  GET HELP IF:  · You see red streaks on the skin coming from the infected area.  · Your red area gets bigger or turns a dark color.  · Your bone or joint under the infected area is painful after the skin heals.  · Your infection comes back in the same area or different area.  · You have a puffy (swollen) bump in the infected area.  · You have new symptoms.  · You have a fever.  GET HELP RIGHT AWAY IF:   · You feel very sleepy.  · You throw up (vomit) or have watery poop (diarrhea).  · You feel sick and have muscle aches and pains.  MAKE SURE YOU:   · Understand these instructions.  · Will watch your condition.  · Will get help right away if you are not doing well or get worse.     This information is not intended to replace advice given to you by your health care provider. Make sure you discuss any questions you have with your health care provider.     Document Released: 06/05/2009 Document Revised: 01/08/2016 Document Reviewed: 03/04/2013  [employer] Interactive Patient Education ©2016 [employer]      Peripheral Edema  You have swelling in your legs (peripheral edema). This swelling is due to excess accumulation of salt and water in your body. Edema may be a sign of heart, kidney or liver disease, or a side effect of a medication. It may also be due to problems in the leg veins. Elevating your legs and using special support stockings may be very helpful, if  the cause of the swelling is due to poor venous circulation. Avoid long periods of standing, whatever the cause.  Treatment of edema depends on identifying the cause. Chips, pretzels, pickles and other salty foods should be avoided. Restricting salt in your diet is almost always needed. Water pills (diuretics) are often used to remove the excess salt and water from your body via urine. These medicines prevent the kidney from reabsorbing sodium. This increases urine flow.  Diuretic treatment may also result in lowering of potassium levels in your body. Potassium supplements may be needed if you have to use diuretics daily. Daily weights can help you keep track of your progress in clearing your edema. You should call your caregiver for follow up care as recommended.  SEEK IMMEDIATE MEDICAL CARE IF:   · You have increased swelling, pain, redness, or heat in your legs.  · You develop shortness of breath, especially when lying down.  · You develop chest or abdominal pain, weakness, or fainting.  · You have a fever.     This information is not intended to replace advice given to you by your health care provider. Make sure you discuss any questions you have with your health care provider.     Document Released: 01/25/2006 Document Revised: 03/11/2013 Document Reviewed: 01/05/2011  [employer] Interactive Patient Education ©2016 [employer]

## 2017-08-08 NOTE — ED PROVIDER NOTES
"ED Provider Note    Scribed for Ramone Pettit D.O. by Sakshi Oneil. 8/7/2017  7:13 PM    Primary care provider: Micky Rubin M.D.  Means of arrival: Walk-in  History obtained from: Patient  History limited by: None    CHIEF COMPLAINT  Chief Complaint   Patient presents with   • Leg Pain   • Leg Swelling       HPI  Karine Ovalle is a 52 y.o. female who presents to the Emergency Department for evaluation of right leg pain and swelling onset a couple months ago. Patient received an ultrasound back in June that was negative for a DVT. Patient states there is a \"hard mass\" to her right leg. She reports the pain has recently began to travel up her right leg to her knee. It is hot to touch. She denies any recent fevers. Patient has associated anxiety secondary to her symptoms. She reports going to a family reunion three weeks ago in [place], [place] and states her pain was slightly alleviated but then returned once she came back to [place].     REVIEW OF SYSTEMS  Pertinent positives include right leg pain and swelling. Pertinent negatives include no fevers.  All other systems reviewed and negative.    PAST MEDICAL HISTORY  Past Medical History   Diagnosis Date   • Arthritis      right knee   • Dental disorder      partial upper   • Pain      left knee and arthritis pain       SURGICAL HISTORY  Past Surgical History   Procedure Laterality Date   • Gastric bypass laparoscopic  2002     [facility]   • Abdominal exploration     • Plastic surgery  2004     excess skin on arms and legs removed   • Debridement  5/17/2012     Performed by BRADLEY DYKES at SURGERY [facility]   • Pr enlarge breast with implant  2004   • Appendectomy laparoscopic  3/21/2013     Performed by Dali Queen M.D. at [facility]   • Knee arthroplasty total Right 10/20/2015     Procedure: KNEE ARTHROPLASTY TOTAL;  Surgeon: Bryon Levine M.D.;  Location: SURGERY [facility];  Service:         SOCIAL " "HISTORY  Social History   Substance Use Topics   • Smoking status: Never Smoker    • Smokeless tobacco: Never Used   • Alcohol Use: 2.0 oz/week     4 Glasses of wine per week      Comment: 4 drinks a week      History   Drug Use No       FAMILY HISTORY  Family History   Problem Relation Age of Onset   • Diabetes Mother    • Heart Disease Mother        CURRENT MEDICATIONS  Home Medications     Reviewed by Anup Kirkpatrick R.N. (Registered Nurse) on 08/07/17 at 1850  Med List Status: Not Addressed    Medication Last Dose Status    cyanocobalamin (VITAMIN B12) 1000 MCG Tab  Active    furosemide (LASIX) 20 MG Tab  Active    Lidocaine HCl 3 % Cream not taking Active    meloxicam (MOBIC) 15 MG tablet  Active    multivitamin (THERAGRAN) Tab  Active    olopatadine (PATANOL) 0.1 % ophthalmic solution  Active    Omega-3 Fatty Acids (FISH OIL) 1000 MG Cap capsule  Active    oxycodone immediate-release (ROXICODONE) 5 MG Tab  Active    phentermine 37.5 MG capsule  Active    potassium chloride ER (KLOR-CON) 10 MEQ tablet  Active    vitamin D (CHOLECALCIFEROL) 1000 UNIT Tab  Active                ALLERGIES  No Known Allergies    PHYSICAL EXAM  VITAL SIGNS: /92 mmHg  Pulse 105  Temp(Src) 36.7 °C (98.1 °F) (Temporal)  Resp 18  Ht 1.6 m (5' 3\")  Wt 103.5 kg (228 lb 2.8 oz)  BMI 40.43 kg/m2  SpO2 96%  LMP 01/02/2016    Nursing notes and vitals reviewed.  Constitutional: Well developed, Well nourished, No acute distress, Non-toxic appearance.   Eyes: PERRLA, EOMI, Conjunctiva normal, No discharge.   Lungs: clear to auscultation bilaterally  Cardiac: regular rate and rhythm, no murmur, no gallop, no rub  Skin: Warm, Dry, slight erythema to the right lateral distal calf anteriorly, slight induration  Musculoskeletal: Intact distal pulses, No edema, No cyanosis, No clubbing. Good range of motion in all major joints. No tenderness to palpation or major deformities noted, significant tenderness and edema with induration to " the right lower lateral calf anteriorly, distal cap refill is less than 2 seconds. Neurologic: Alert & oriented x 3, Normal motor function, Normal sensory function, No focal deficits noted.  Psychiatric: Affect normal for clinical presentation.    DIAGNOSTIC STUDIES/PROCEDURES    LABS  Results for orders placed or performed during the hospital encounter of 17   EKG (ER)   Result Value Ref Range    Report       [facility] Emergency Dept.    Test Date:  2017  Pt Name:    ANA LAWLER                  Department: ER  MRN:        [id number]                      Room:  Gender:     F                            Technician: [id number]  :        1965                   Requested By:ER TRIAGE PROTOCOL  Order #:    [id number]                    Reading MD: MARLY PARRA DO    Measurements  Intervals                                Axis  Rate:       98                           P:          45  IN:         160                          QRS:        -4  QRSD:       86                           T:          16  QT:         368  QTc:        470    Interpretive Statements  SINUS RHYTHM  LEFT VENTRICULAR HYPERTROPHY  Compared to ECG 2015 16:43:09  No significant changes    Electronically Signed On 2017 22:06:20 PDT by MARLY PARRA DO       All labs reviewed by me.    RADIOLOGY  LE VENOUS DUPLEX (Specify in Comments Left, Right Or Bilateral)   Final Result      CT-EXTREMITY, LOWER WITH RIGHT    (Results Pending)     The radiologist's interpretation of all radiological studies have been reviewed by me.    COURSE & MEDICAL DECISION MAKING  Pertinent Labs & Imaging studies reviewed. (See chart for details)    7:13 PM - Patient seen and examined at bedside. Ordered LE venous duplex and EKG to evaluate her symptoms.       This is a charming 52 y.o. female that presents with right lower lateral leg edema and tenderness. AS negative for DVT. She has a masslike lesion in the area and  concern for possible lesion/abscess. For this reason, CT scan of the lower extremity has been ordered. The patient has been signed out to my partner for further evaluation and management.      FINAL IMPRESSION  Leg pain     Sakshi IRELAND (Scribe), am scribing for, and in the presence of, Ramone Pettit D.O    Electronically signed by: Sakshi Oneil (Scribe), 8/7/2017    Ramone IRELAND D.O. personally performed the services described in this documentation, as scribed by Sakshi Oneil in my presence, and it is both accurate and complete.    The note accurately reflects work and decisions made by me.  Ramone Pettit  8/7/2017  10:08 PM

## 2017-08-08 NOTE — ED PROVIDER NOTES
"ED PROVIDER NOTE    Scribed for Seamus Edgar M.D. by Suzan Goldberg. 8/7/2017, 11:46 PM.    This is an addendum to the note on Karine Ovalle. For further details and full chart entry, see the previously signed ED Provider Note written by Dr. Pettit (ERP).      11:46 PM - I discussed the patient's case with Dr. Pettit (ERP) who will transfer care of the patient to me at this time awaiting pending CT.    11:46 PM -  Patient had a fall in May had her leg swelling and pain has worsened since. She states it is warm to touch and tingling associated. This prompted her to report to the ED for possible DVT tonight. Patent's radiology and lab results discussed which do not indicate a blood clot. She is not on blood thinners. Discussed my plan and treatment for patient which includes prescription of 500mg Keflex and 800-160mg Bactrim for suspected skin infection. Patient understood and is in agreement for discharge.     Upon reexamination of patient, her right lower extremity has an area measuring 8cmx 8cm induration with erythmea and warmth. At this point I believe her symptoms indicate cellulitis. There is no sign of mass or abcess on CT. I will treat her with  500mg Keflex and 800-160mg Bactrim.     /92 mmHg  Pulse 97  Temp(Src) 36.7 °C (98.1 °F) (Temporal)  Resp 19  Ht 1.6 m (5' 3\")  Wt 103.5 kg (228 lb 2.8 oz)  BMI 40.43 kg/m2  SpO2 96%  LMP 01/02/2016       The patient will return for new or worsening symptoms and is stable at the time of discharge.    The patient is referred to a primary physician for blood pressure management, diabetic screening, and for all other preventative health concerns.    DISPOSITION:  Patient will be discharged home in stable condition.    FOLLOW UP:  Micky Rubin M.D.  [address]  [place] [id number] [phone]    Schedule an appointment as soon as possible for a visit in 3 days  For wound re-check      OUTPATIENT MEDICATIONS:  Discharge " Medication List as of 8/8/2017 12:23 AM      START taking these medications    Details   sulfamethoxazole-trimethoprim (BACTRIM DS) 800-160 MG tablet Take 1 Tab by mouth every 12 hours for 10 days., Disp-20 Tab, R-0, Print Rx Paper      cephALEXin (KEFLEX) 500 MG Cap Take 1 Cap by mouth 4 times a day., Disp-40 Cap, R-0, Print Rx Paper               FINAL IMPRESSION   1. Edema of right lower extremity      2. Cellulitis of right leg      3. Pain and swelling of right lower leg         ISuzan (Scribe), am scribing for, and in the presence of, Seamus Edgar M.D.    Electronically signed by: Suzan Goldberg (Scribe), 8/7/2017    Seamus IRELAND M.* personally performed the services described in this documentation, as scribed by Suzan Goldberg in my presence, and it is both accurate and complete.    The note accurately reflects work and decisions made by me.  Seamus Edgar  8/8/2017  6:59 AM

## 2017-08-08 NOTE — TELEPHONE ENCOUNTER
ESTABLISHED PATIENT PRE-VISIT PLANNING     Note: Patient will not be contacted if there is no indication to call.     1.  Reviewed notes from the last few office visits within the medical group: Yes    2.  If any orders were placed at last visit or intended to be done for this visit (i.e. 6 mos follow-up), do we have Results/Consult Notes?        •  Labs - Labs were not ordered at last office visit.       •  Imaging - Imaging ordered, completed and results are in chart.       •  Referrals - Referral ordered, patient has NOT been seen.    3. Is this appointment scheduled as a Hospital Follow-Up? Yes, visit was at [facility].     4.  Immunizations were updated in Epic using WebIZ?: No WebIZ record       •  Web Iz Recommendations:     5.  Patient is due for the following Health Maintenance Topics:   Health Maintenance Due   Topic Date Due   • URINE DRUG SCREEN  1965   • IMM PNEUMOCOCCAL 19-64 (ADULT) MEDIUM RISK SERIES (1 of 1 - PPSV23) 05/17/1984   • COLONOSCOPY  05/17/2015   • PAP SMEAR  07/09/2016           6.  Patient was NOT informed to arrive 15 min prior to their scheduled appointment and bring in their medication bottles.

## 2017-08-08 NOTE — ED NOTES
Patient called and asked that her prescriptions get phoned in or [facility] [address]. I have sent over the scripts for Bactrim and Cephalexin to this [facility].    Meghna Diaz, PharmD, CGP, BCPS

## 2017-08-09 ENCOUNTER — OFFICE VISIT (OUTPATIENT)
Dept: MEDICAL GROUP | Age: 52
End: 2017-08-09
Payer: COMMERCIAL

## 2017-08-09 ENCOUNTER — HOSPITAL ENCOUNTER (OUTPATIENT)
Facility: MEDICAL CENTER | Age: 52
End: 2017-08-09
Attending: INTERNAL MEDICINE
Payer: COMMERCIAL

## 2017-08-09 VITALS
WEIGHT: 227 LBS | OXYGEN SATURATION: 92 % | SYSTOLIC BLOOD PRESSURE: 124 MMHG | HEIGHT: 62 IN | TEMPERATURE: 36.5 F | BODY MASS INDEX: 41.77 KG/M2 | HEART RATE: 93 BPM | DIASTOLIC BLOOD PRESSURE: 78 MMHG

## 2017-08-09 DIAGNOSIS — Z79.891 CHRONIC USE OF OPIATE DRUGS THERAPEUTIC PURPOSES: ICD-10-CM

## 2017-08-09 DIAGNOSIS — J45.20 MILD INTERMITTENT ASTHMA WITHOUT COMPLICATION: ICD-10-CM

## 2017-08-09 DIAGNOSIS — Z12.11 COLON CANCER SCREENING: ICD-10-CM

## 2017-08-09 DIAGNOSIS — L03.119 CELLULITIS AND ABSCESS OF LEG: ICD-10-CM

## 2017-08-09 DIAGNOSIS — G89.4 CHRONIC PAIN SYNDROME: ICD-10-CM

## 2017-08-09 DIAGNOSIS — L02.419 CELLULITIS AND ABSCESS OF LEG: ICD-10-CM

## 2017-08-09 DIAGNOSIS — Z00.00 HEALTHCARE MAINTENANCE: ICD-10-CM

## 2017-08-09 DIAGNOSIS — E66.01 SEVERE OBESITY (BMI >= 40) (HCC): ICD-10-CM

## 2017-08-09 PROBLEM — Q27.8: Status: RESOLVED | Noted: 2017-08-07 | Resolved: 2017-08-09

## 2017-08-09 PROBLEM — R93.89 ABNORMAL FINDING ON ULTRASOUND: Status: RESOLVED | Noted: 2017-08-07 | Resolved: 2017-08-09

## 2017-08-09 PROCEDURE — G0480 DRUG TEST DEF 1-7 CLASSES: HCPCS

## 2017-08-09 PROCEDURE — 80307 DRUG TEST PRSMV CHEM ANLYZR: CPT

## 2017-08-09 PROCEDURE — 99000 SPECIMEN HANDLING OFFICE-LAB: CPT | Performed by: INTERNAL MEDICINE

## 2017-08-09 PROCEDURE — 99215 OFFICE O/P EST HI 40 MIN: CPT | Mod: 25 | Performed by: INTERNAL MEDICINE

## 2017-08-09 PROCEDURE — G0480 DRUG TEST DEF 1-7 CLASSES: HCPCS | Mod: 91

## 2017-08-09 ASSESSMENT — ENCOUNTER SYMPTOMS
EYES NEGATIVE: 1
CARDIOVASCULAR NEGATIVE: 1
RESPIRATORY NEGATIVE: 1
CONSTITUTIONAL NEGATIVE: 1
BACK PAIN: 1
NEUROLOGICAL NEGATIVE: 1
PSYCHIATRIC NEGATIVE: 1
GASTROINTESTINAL NEGATIVE: 1

## 2017-08-09 NOTE — PROGRESS NOTES
"Subjective:      Karine Ovalle is a 52 y.o. female who presents with Hospital Follow-up   from the ER from yesterday.. Not hospitalized. Was diagnosed with cellulitis of the right lower leg inner aspect. Started on Keflex and Bactrim. CT scan of the leg was negative for osteomyelitis or abscess, but did show fluid accumulation. Doppler ultrasound previously showed no conclusive evidence of DVT. Patient denies any chest pain or dyspnea.    Patient completed her [facility] paperwork last week and was approved. She brought in copies of that.    Health maintenance patient still has not done her colonoscopy or Pap smear yet. She will give us as soon as possible. Up-to-date on mammograms.    Chronic pain syndrome. Follow-up with pain management as she is doing. He struck screening done however unless already done and her pain management doctors.    With regard to obesity patient's taking phentermine and trying to follow a high-protein low-carb diet. She's had a difficult exercise with her leg problems from her previous injury.          HPI    Review of Systems   Constitutional: Negative.    HENT: Negative.    Eyes: Negative.    Respiratory: Negative.    Cardiovascular: Negative.    Gastrointestinal: Negative.    Genitourinary: Negative.    Musculoskeletal: Positive for back pain and joint pain.   Skin: Negative.         Area of hardness right lower leg inner aspect noticeable for the last few weeks about 2 inch diameter   Neurological: Negative.    Endo/Heme/Allergies: Negative.    Psychiatric/Behavioral: Negative.           Objective:     /78 mmHg  Pulse 93  Temp(Src) 2.5 °C (36.5 °F)  Ht 1.57 m (5' 1.81\")  Wt 102.967 kg (227 lb)  BMI 41.77 kg/m2  SpO2 92%  LMP 01/02/2016     Physical Exam   Constitutional: She is oriented to person, place, and time. She appears well-developed and well-nourished.   HENT:   Head: Normocephalic and atraumatic.   Right Ear: External ear normal.   Left Ear: External ear normal. "   Nose: Nose normal.   Mouth/Throat: Oropharynx is clear and moist.   Eyes: Conjunctivae and EOM are normal. Pupils are equal, round, and reactive to light. Right eye exhibits no discharge. Left eye exhibits no discharge. No scleral icterus.   Neck: Normal range of motion. Neck supple. No JVD present. No tracheal deviation present. No thyromegaly present.   Cardiovascular: Normal rate, regular rhythm, normal heart sounds and intact distal pulses.  Exam reveals no gallop and no friction rub.    Pulmonary/Chest: Effort normal and breath sounds normal. No stridor. No respiratory distress. She has no wheezes. She has no rales. She exhibits no tenderness.   Abdominal: Soft. Bowel sounds are normal. She exhibits no distension and no mass. There is no tenderness. There is no rebound and no guarding. No hernia.   Musculoskeletal: Normal range of motion. She exhibits no edema or tenderness.   Lymphadenopathy:     She has no cervical adenopathy.   Neurological: She is alert and oriented to person, place, and time. She has normal reflexes. She displays normal reflexes. No cranial nerve deficit. Coordination normal.   Skin: Skin is warm and dry. No rash noted. No erythema. No pallor.   Along the distal lower leg on the right there is a 4 x 4 centimeters area of induration and tenderness but no significant erythema, although the skin color is difficult to determine in her dark skin. This is consistent with a cellulitis. There is no drainage.   Psychiatric: She has a normal mood and affect. Her behavior is normal. Judgment and thought content normal.   Nursing note and vitals reviewed.    Admission on 08/07/2017, Discharged on 08/08/2017   Component Date Value   • Report 08/07/2017                      Value:[facility]    Test Date:  2017-08-07  Pt Name:    ANA LAWLER                  Department: ER  MRN:        [id number]                      Room:  Gender:     F                             Technician: [id number]  :        1965                   Requested By:ER TRIAGE PROTOCOL  Order #:    [id number]                    Reading MD: MARLY PARRA DO    Measurements  Intervals                                Axis  Rate:       98                           P:          45  AK:         160                          QRS:        -4  QRSD:       86                           T:          16  QT:         368  QTc:        470    Interpretive Statements  SINUS RHYTHM  LEFT VENTRICULAR HYPERTROPHY  Compared to ECG 2015 16:43:09  No significant changes    Electronically Signed On 2017 22:06:20 PDT by MARLY PARRA DO        Lab Results   Component Value Date/Time    GLYCOHEMOGLOBIN 5.3 2017 09:03 AM     Lab Results   Component Value Date/Time    SODIUM 134* 2017 09:02 AM    POTASSIUM 3.9 2017 09:02 AM    CHLORIDE 102 2017 09:02 AM    CO2 26 2017 09:02 AM    GLUCOSE 96 2017 09:02 AM    BUN 11 2017 09:02 AM    CREATININE 0.75 2017 09:02 AM    BUN-CREATININE RATIO 13 2010 12:00 AM    GLOM FILT RATE, EST >59 2010 12:00 AM    ALKALINE PHOSPHATASE 100* 2017 09:02 AM    AST(SGOT) 18 2017 09:02 AM    ALT(SGPT) 10 2017 09:02 AM    TOTAL BILIRUBIN 0.7 2017 09:02 AM     Lab Results   Component Value Date/Time    INR 1.04 2012 12:20 PM     Lab Results   Component Value Date/Time    CHOLESTEROL, 2017 09:02 AM    LDL 63 2017 09:02 AM    HDL 69 2017 09:02 AM    TRIGLYCERIDES 60 2017 09:02 AM       No results found for: TESTOSTERONE  Lab Results   Component Value Date/Time    TSH 2.110 2010 12:00 AM     Lab Results   Component Value Date/Time    FREE T-4 0.72 2016 07:32 AM     Lab Results   Component Value Date/Time    URIC ACID 5.6 2014 04:49 PM     No components found for: VITB12  Lab Results   Component Value Date/Time    25-HYDROXY   VITAMIN D 25 33 2014 04:49  PM    25-HYDROXY   VITAMIN D 25 25* 07/03/2012 09:20 AM                  Assessment/Plan:     1. Cellulitis and abscess of leg- right lower inner-4 x 4 centimeter area of induration and tenderness. Started on Keflex and Bactrim yesterday. She'll continue on this for 10 days and recheck in a week. May need another ten-day course completed 3 weeks of treatment. We'll make this determination next week in follow-up visit.        2. Severe obesity (BMI >= 40) (CMS-HCC)-diet exercise and weight reduction at least 15 pounds for the next 3 months. Status post gastric bypass.       3. Mild intermittent asthma without complication-under good control. Continue same regimen.       4. Chronic pain syndrome- [place] adv pain, dr sanders-under good control. Continue same regimen.     - PAIN MANAGEMENT PANEL, SCRN W/ RFLX TO QNT; Future    5. Chronic use of opiate drugs therapeutic purposes-under good control. Continue same regimen.       6. Colon cancer screening-patient will call for appointment and schedule.     - REFERRAL TO GI FOR COLONOSCOPY    7. Healthcare maintenance-needs Pap smear and colon cancer screening-patient says she has upcoming appointment with GYN for Pap smear.             40 minute face-to-face encounter took place today.  More than half of this time was spent in the coordination of care of the above problems, as well as counseling.

## 2017-08-09 NOTE — MR AVS SNAPSHOT
"        Karine Ovalle   2017 10:40 AM   Office Visit   MRN: [id number]    Department:  [address] [place] [address]   Dept Phone:  [phone]    Description:  Female : 1965   Provider:  Micky Rubin M.D.           Reason for Visit     Hospital Follow-up ER 17      Allergies as of 2017     No Known Allergies      You were diagnosed with     Cellulitis and abscess of leg   [927890]       Severe obesity (BMI >= 40) (CMS-HCC)   [364865]       Mild intermittent asthma without complication   [716692]       Chronic pain syndrome   [338.4.ICD-9-CM]         Vital Signs     Blood Pressure Pulse Temperature Height Weight Body Mass Index    124/78 mmHg 93 2.5 °C (36.5 °F) 1.57 m (5' 1.81\") 102.967 kg (227 lb) 41.77 kg/m2    Oxygen Saturation Last Menstrual Period Smoking Status             92% 2016 Never Smoker          Basic Information     Date Of Birth Sex Race Ethnicity Preferred Language    1965 Female Black or  Non- English      Your appointments     Aug 15, 2017  3:00 PM   New Patient with Malissa Barrera M.D.   [facility] ([address], [address]  [facility] [address] [phone]            Aug 17, 2017  1:00 PM   Urgent/Same Day with Micky Rubin M.D.   [address] [address] [phone]           You will be receiving a confirmation call a few days before your appointment from our automated call confirmation system.            Sep 12, 2017  3:20 PM   Established Patient with Micky Rubin M.D.   [address] [address] [phone]           You will be receiving a confirmation call a few days before your appointment from our automated call confirmation system.              Problem List              ICD-10-CM Priority Class Noted - Resolved    Mild intermittent asthma without " complication J45.20 High  4/25/2012 - Present    Fibroids D25.9   8/13/2013 - Present    Primary osteoarthritis of right knee- sp right TKR 2015 - dr nowak M17.11   8/13/2013 - Present    Weight gain status post gastric bypass- in [place] 2002; was 320 lbs- got down to 155 R63.5   8/12/2014 - Present    Vitamin B 12 deficiency E53.8   6/2/2016 - Present    Severe obesity (BMI >= 40) (CMS-HCC) E66.01   10/21/2016 - Present    Chronic pain syndrome- [place] adv pain, dr sanders G89.4   6/8/2017 - Present    Chronic use of opiate for therapeutic purpose Z79.891   6/8/2017 - Present    Lesion of soft tissue of lower leg and ankle M79.89   7/26/2017 - Present    Sprain of right ankle S93.401A   7/26/2017 - Present    Swelling of ankle M25.473   7/26/2017 - Present    Administrative encounter- FMLA paperwork fill out with pt asst Z02.9   7/26/2017 - Present    Acute right hip pain- traumatic M25.551   7/26/2017 - Present    Acute low back pain due to trauma M54.5   7/26/2017 - Present    Cellulitis and abscess of leg- right lower inner L02.419, L03.119   8/9/2017 - Present      Health Maintenance        Date Due Completion Dates    IMM PNEUMOCOCCAL 19-64 (ADULT) MEDIUM RISK SERIES (1 of 1 - PPSV23) 5/17/1984 ---    COLONOSCOPY 5/17/2015 ---    PAP SMEAR 7/9/2016 7/9/2013, 10/14/2011, 8/10/2010    IMM INFLUENZA (1) 9/1/2017 ---    MAMMOGRAM 7/27/2019 7/27/2017, 6/30/2016 (Done), 8/19/2014, 7/26/2013, 7/6/2012, 8/20/2010, 8/20/2010, 9/30/2008, 9/30/2008, 10/25/2007, 10/25/2007    Override on 6/30/2016: Done    IMM DTaP/Tdap/Td Vaccine (2 - Td) 5/7/2022 5/7/2012            Current Immunizations     Tdap Vaccine 5/7/2012      Below and/or attached are the medications your provider expects you to take. Review all of your home medications and newly ordered medications with your provider and/or pharmacist. Follow medication instructions as directed by your provider and/or pharmacist. Please keep your medication list with you and  share with your provider. Update the information when medications are discontinued, doses are changed, or new medications (including over-the-counter products) are added; and carry medication information at all times in the event of emergency situations     Allergies:  No Known Allergies          Medications  Valid as of: August 09, 2017 - 11:14 AM    Generic Name Brand Name Tablet Size Instructions for use    Cephalexin (Cap) KEFLEX 500 MG Take 1 Cap by mouth 4 times a day.        Cholecalciferol (Tab) cholecalciferol 1000 UNIT Take 1,000 Units by mouth every day.        Cyanocobalamin (Tab) VITAMIN B12 1000 MCG Take 1 Tab by mouth every day.        Furosemide (Tab) LASIX 20 MG TAKE ONE TABLET BY MOUTH ONCE DAILY        Lidocaine HCl (Cream) Lidocaine HCl 3 % Apply cream to affected area 2 times daily as needed        Meloxicam (Tab) MOBIC 15 MG 1 tab po qday with food for djd        Multiple Vitamin (Tab) THERAGRAN  Take 1 Tab by mouth every day.        Olopatadine HCl (Solution) PATANOL 0.1 % Place 1 Drop in both eyes 2 times a day.        Omega-3 Fatty Acids (Cap) fish oil 1000 MG Take 3,000 mg by mouth every day.        OxyCODONE HCl (Tab) ROXICODONE 5 MG Take 1 Tab by mouth every 8 hours as needed for Severe Pain.        Phentermine HCl (Cap) phentermine 37.5 MG Take 1 Cap by mouth every morning.        Potassium Chloride (Tab CR) KLOR-CON 10 MEQ TAKE ONE TABLET BY MOUTH ONCE DAILY        Sulfamethoxazole-Trimethoprim (Tab) BACTRIM -160 MG Take 1 Tab by mouth every 12 hours for 10 days.        .                 Medicines prescribed today were sent to:     [address] [place] - [address] [address] [facility] [place] [address]    Phone: [phone] Fax: [phone]    Open 24 Hours?: No      Medication refill instructions:       If your prescription bottle indicates you have medication refills left, it is not necessary to call your provider’s office. Please contact your pharmacy  and they will refill your medication.    If your prescription bottle indicates you do not have any refills left, you may request refills at any time through one of the following ways: The online [facility] system (except Urgent Care), by calling your provider’s office, or by asking your pharmacy to contact your provider’s office with a refill request. Medication refills are processed only during regular business hours and may not be available until the next business day. Your provider may request additional information or to have a follow-up visit with you prior to refilling your medication.   *Please Note: Medication refills are assigned a new Rx number when refilled electronically. Your pharmacy may indicate that no refills were authorized even though a new prescription for the same medication is available at the pharmacy. Please request the medicine by name with the pharmacy before contacting your provider for a refill.           [employer] Access Code: Activation code not generated  Current [employer] Status: Active

## 2017-08-10 LAB
AMPHET CTO UR CFM-MCNC: POSITIVE NG/ML
BARBITURATES CTO UR CFM-MCNC: NEGATIVE NG/ML
BENZODIAZ CTO UR CFM-MCNC: NEGATIVE NG/ML
BUPRENORPHINE UR-MCNC: NEGATIVE NG/ML
CANNABINOIDS CTO UR CFM-MCNC: NEGATIVE NG/ML
CARISOPRODOL UR-MCNC: NEGATIVE NG/ML
COCAINE CTO UR CFM-MCNC: NEGATIVE NG/ML
DRUG SCREEN COMMENT UR-IMP: NORMAL
ETHYL GLUCURONIDE UR QL SCN: NEGATIVE NG/ML
FENTANYL UR-MCNC: NEGATIVE NG/ML
MEPERIDINE CTO UR SCN-MCNC: NEGATIVE NG/ML
METHADONE CTO UR CFM-MCNC: NEGATIVE NG/ML
OPIATES UR QL SCN: POSITIVE NG/ML
OXYCDOXYM URSCRN 2005102: POSITIVE NG/ML
PCP CTO UR CFM-MCNC: NEGATIVE NG/ML
PROPOXYPH CTO UR CFM-MCNC: NEGATIVE NG/ML
TAPENTADOL UR-MCNC: NEGATIVE NG/ML
TRAMADOL CTO UR SCN-MCNC: NEGATIVE NG/ML
ZOLPIDEM UR-MCNC: NEGATIVE NG/ML

## 2017-08-12 LAB
6MAM UR CFM-MCNC: <10 NG/ML
CODEINE UR CFM-MCNC: <20 NG/ML
HYDROCODONE UR CFM-MCNC: <20 NG/ML
HYDROMORPHONE UR CFM-MCNC: <20 NG/ML
MORPHINE UR CFM-MCNC: <20 NG/ML
NORHYDROCODONE UR CFM-MCNC: <20 NG/ML
NOROXYCODONE UR CFM-MCNC: >4000 NG/ML
OPIATES UR NOROXYM Q0836: 223 NG/ML
OXYCODONE UR CFM-MCNC: 2179 NG/ML
OXYMORPHONE UR CFM-MCNC: 46 NG/ML

## 2017-08-14 ENCOUNTER — PATIENT MESSAGE (OUTPATIENT)
Dept: MEDICAL GROUP | Age: 52
End: 2017-08-14

## 2017-08-14 DIAGNOSIS — L03.115 CELLULITIS OF RIGHT THIGH: ICD-10-CM

## 2017-08-14 LAB
AMPHET UR CFM-MCNC: <200 NG/ML
MDA UR CFM-MCNC: <200 NG/ML
MDEA UR CFM-MCNC: <200 NG/ML
MDMA UR CFM-MCNC: <200 NG/ML
METHAMPHET UR CFM-MCNC: <200 NG/ML

## 2017-08-14 NOTE — TELEPHONE ENCOUNTER
From: Karine Ovalle  To: Micky Rubin M.D.  Sent: 8/14/2017 11:44 AM PDT  Subject: Test Result Question    Dr. Rubin,    Did you find anything of my prescribe medication that could have made my legs and ankles swell up?  Also, what gave me the infection in my right lower leg? I am still taking the prescribed antibiotics, but the hard mass  inside my right leg did not go down or soften...I'm quite worried. Should I have an MRI ? if so, could you please place an  order for one.  I also take some over the counter .99cent type of pain/sleeping pills...could the combo of prescribed & sleep  meds be harmful

## 2017-08-14 NOTE — TELEPHONE ENCOUNTER
MRI OF RIGHT THIGH ORDERED; I do not know what caused the skin infection (cellulitis) in her right leg. She should be referred to the wound clinic, for further management. Perhaps they will have some answers to these questions.. This was ordered.

## 2017-08-15 RX ORDER — POTASSIUM CHLORIDE 750 MG/1
TABLET, FILM COATED, EXTENDED RELEASE ORAL
Qty: 30 TAB | Refills: 0 | Status: SHIPPED | OUTPATIENT
Start: 2017-08-15 | End: 2017-09-12 | Stop reason: SDUPTHER

## 2017-08-16 ENCOUNTER — HOSPITAL ENCOUNTER (OUTPATIENT)
Dept: PAIN MANAGEMENT | Facility: REHABILITATION | Age: 52
End: 2017-08-16
Attending: PAIN MEDICINE
Payer: COMMERCIAL

## 2017-08-17 ENCOUNTER — TELEPHONE (OUTPATIENT)
Dept: MEDICAL GROUP | Age: 52
End: 2017-08-17

## 2017-08-17 ENCOUNTER — OFFICE VISIT (OUTPATIENT)
Dept: MEDICAL GROUP | Age: 52
End: 2017-08-17
Payer: COMMERCIAL

## 2017-08-17 VITALS
HEART RATE: 98 BPM | WEIGHT: 222 LBS | BODY MASS INDEX: 40.85 KG/M2 | HEIGHT: 62 IN | OXYGEN SATURATION: 99 % | DIASTOLIC BLOOD PRESSURE: 80 MMHG | SYSTOLIC BLOOD PRESSURE: 130 MMHG | TEMPERATURE: 97.3 F

## 2017-08-17 DIAGNOSIS — G89.4 CHRONIC PAIN SYNDROME: ICD-10-CM

## 2017-08-17 DIAGNOSIS — Z12.11 COLON CANCER SCREENING: ICD-10-CM

## 2017-08-17 DIAGNOSIS — E66.01 SEVERE OBESITY (BMI >= 40) (HCC): ICD-10-CM

## 2017-08-17 DIAGNOSIS — J45.20 MILD INTERMITTENT ASTHMA WITHOUT COMPLICATION: ICD-10-CM

## 2017-08-17 DIAGNOSIS — L03.115 CELLULITIS OF RIGHT LOWER EXTREMITY: ICD-10-CM

## 2017-08-17 PROCEDURE — 99215 OFFICE O/P EST HI 40 MIN: CPT | Performed by: INTERNAL MEDICINE

## 2017-08-17 RX ORDER — SULFAMETHOXAZOLE AND TRIMETHOPRIM 800; 160 MG/1; MG/1
1 TABLET ORAL EVERY 12 HOURS
Qty: 20 TAB | Refills: 0 | Status: SHIPPED | OUTPATIENT
Start: 2017-08-17 | End: 2017-08-28 | Stop reason: SDUPTHER

## 2017-08-17 RX ORDER — CEPHALEXIN 500 MG/1
500 CAPSULE ORAL 4 TIMES DAILY
Qty: 40 CAP | Refills: 0 | Status: SHIPPED | OUTPATIENT
Start: 2017-08-17 | End: 2017-08-28 | Stop reason: SDUPTHER

## 2017-08-17 ASSESSMENT — ENCOUNTER SYMPTOMS
RESPIRATORY NEGATIVE: 1
PSYCHIATRIC NEGATIVE: 1
CONSTITUTIONAL NEGATIVE: 1
CARDIOVASCULAR NEGATIVE: 1
GASTROINTESTINAL NEGATIVE: 1
MUSCULOSKELETAL NEGATIVE: 1
NEUROLOGICAL NEGATIVE: 1
EYES NEGATIVE: 1

## 2017-08-17 NOTE — MR AVS SNAPSHOT
"        Karine Ovalle   2017 1:00 PM   Office Visit   MRN: [id number]    Department:  [address] [place] [address]   Dept Phone:  [phone]    Description:  Female : 1965   Provider:  Micky Rubin M.D.           Reason for Visit     Wound Infection leg infection evaluation       Allergies as of 2017     No Known Allergies      You were diagnosed with     Cellulitis of right lower extremity   [978801]       Severe obesity (BMI >= 40) (CMS-HCC)   [646591]       Chronic pain syndrome   [338.4.ICD-9-CM]       Mild intermittent asthma without complication   [998304]       Colon cancer screening   [976981]         Vital Signs     Blood Pressure Pulse Temperature Height Weight Body Mass Index    130/80 mmHg 98 36.3 °C (97.3 °F) 1.57 m (5' 1.81\") 100.699 kg (222 lb) 40.85 kg/m2    Oxygen Saturation Last Menstrual Period Breastfeeding? Smoking Status          99% 2017 No Never Smoker         Basic Information     Date Of Birth Sex Race Ethnicity Preferred Language    1965 Female Black or  Non- English      Your appointments     Aug 21, 2017  7:00 AM   MR LOWER EXTREMITY W/WO with [address]   [facility] - MRI - [address])    [address]  [facility] [id number] [phone]            Sep 12, 2017  3:20 PM   Established Patient with Micky Rubin M.D.   [address]    [address] [facility] [address]  [facility] [address] [phone]           You will be receiving a confirmation call a few days before your appointment from our automated call confirmation system.            2017  2:30 PM   New Patient with Emy Nguyen M.D.   [facility]    [address]  [place] [address] [phone]              Problem List              ICD-10-CM Priority Class Noted - Resolved    Mild intermittent asthma without complication J45.20 High  2012 - Present   " Fibroids D25.9   8/13/2013 - Present    Primary osteoarthritis of right knee- sp right TKR 2015 - dr nowak M17.11   8/13/2013 - Present    Weight gain status post gastric bypass- in [place] 2002; was 320 lbs- got down to 155 R63.5   8/12/2014 - Present    Vitamin B 12 deficiency E53.8   6/2/2016 - Present    Severe obesity (BMI >= 40) (CMS-HCC) E66.01   10/21/2016 - Present    Chronic pain syndrome- [place] adv pain, dr sanders G89.4   6/8/2017 - Present    Chronic use of opiate for therapeutic purpose- [place] adv pain [place] sanders following Z79.891   6/8/2017 - Present    Lesion of soft tissue of lower leg and ankle M79.89   7/26/2017 - Present    Sprain of right ankle S93.401A   7/26/2017 - Present    Swelling of ankle M25.473   7/26/2017 - Present    Administrative encounter- [place] paperwork fill out with pt asst Z02.9   7/26/2017 - Present    Acute right hip pain- traumatic M25.551   7/26/2017 - Present    Acute low back pain due to trauma M54.5   7/26/2017 - Present    Cellulitis and abscess of leg- right lower inner L02.419, L03.119   8/9/2017 - Present    Chronic use of opiate drugs therapeutic purposes Z79.891   8/9/2017 - Present    Colon cancer screening Z12.11   8/9/2017 - Present    Healthcare maintenance-needs Pap smear and colon cancer screening Z00.00   8/9/2017 - Present      Health Maintenance        Date Due Completion Dates    IMM PNEUMOCOCCAL 19-64 (ADULT) MEDIUM RISK SERIES (1 of 1 - PPSV23) 5/17/1984 ---    COLONOSCOPY 5/17/2015 ---    PAP SMEAR 7/9/2016 7/9/2013, 10/14/2011, 8/10/2010    IMM INFLUENZA (1) 9/1/2017 ---    MAMMOGRAM 7/27/2019 7/27/2017, 6/30/2016 (Done), 8/19/2014, 7/26/2013, 7/6/2012, 8/20/2010, 8/20/2010, 9/30/2008, 9/30/2008, 10/25/2007, 10/25/2007    Override on 6/30/2016: Done    IMM DTaP/Tdap/Td Vaccine (2 - Td) 5/7/2022 5/7/2012            Current Immunizations     Tdap Vaccine 5/7/2012      Below and/or attached are the medications your provider expects you to take. Review all of  your home medications and newly ordered medications with your provider and/or pharmacist. Follow medication instructions as directed by your provider and/or pharmacist. Please keep your medication list with you and share with your provider. Update the information when medications are discontinued, doses are changed, or new medications (including over-the-counter products) are added; and carry medication information at all times in the event of emergency situations     Allergies:  No Known Allergies          Medications  Valid as of: August 17, 2017 -  1:51 PM    Generic Name Brand Name Tablet Size Instructions for use    Cephalexin (Cap) KEFLEX 500 MG Take 1 Cap by mouth 4 times a day.        Cholecalciferol (Tab) cholecalciferol 1000 UNIT Take 1,000 Units by mouth every day.        Cyanocobalamin (Tab) VITAMIN B12 1000 MCG Take 1 Tab by mouth every day.        Furosemide (Tab) LASIX 20 MG TAKE ONE TABLET BY MOUTH ONCE DAILY        Lidocaine HCl (Cream) Lidocaine HCl 3 % Apply cream to affected area 2 times daily as needed        Meloxicam (Tab) MOBIC 15 MG 1 tab po qday with food for djd        Multiple Vitamin (Tab) THERAGRAN  Take 1 Tab by mouth every day.        Olopatadine HCl (Solution) PATANOL 0.1 % Place 1 Drop in both eyes 2 times a day.        Omega-3 Fatty Acids (Cap) fish oil 1000 MG Take 3,000 mg by mouth every day.        OxyCODONE HCl (Tab) ROXICODONE 5 MG Take 1 Tab by mouth every 8 hours as needed for Severe Pain.        Phentermine HCl (Cap) phentermine 37.5 MG Take 1 Cap by mouth every morning.        Potassium Chloride (Tab CR) KLOR-CON 10 MEQ TAKE ONE TABLET BY MOUTH ONCE DAILY        Sulfamethoxazole-Trimethoprim (Tab) BACTRIM -160 MG Take 1 Tab by mouth every 12 hours for 10 days.        .                 Medicines prescribed today were sent to:     [facility] [place]), [place] - [address] [facility] [place]) [place] [address]    Phone: [phone] Fax: [phone]     Open 24 Hours?: No      Medication refill instructions:       If your prescription bottle indicates you have medication refills left, it is not necessary to call your provider’s office. Please contact your pharmacy and they will refill your medication.    If your prescription bottle indicates you do not have any refills left, you may request refills at any time through one of the following ways: The online [employer] system (except Urgent Care), by calling your provider’s office, or by asking your pharmacy to contact your provider’s office with a refill request. Medication refills are processed only during regular business hours and may not be available until the next business day. Your provider may request additional information or to have a follow-up visit with you prior to refilling your medication.   *Please Note: Medication refills are assigned a new Rx number when refilled electronically. Your pharmacy may indicate that no refills were authorized even though a new prescription for the same medication is available at the pharmacy. Please request the medicine by name with the pharmacy before contacting your provider for a refill.        Your To Do List     Future Labs/Procedures Complete By Expires    MR-MRA LOWER EXTREMITY-WITH & W/O RIGHT  As directed 8/17/2018      Referral     A referral request has been sent to our patient care coordination department. Please allow 3-5 business days for us to process this request and contact you either by phone or mail. If you do not hear from us by the 5th business day, please call us at [phone].           [employer] Access Code: Activation code not generated  Current [employer] Status: Active

## 2017-08-17 NOTE — TELEPHONE ENCOUNTER
Phone Number Called: [phone]    Message: Informed Allie of Dr. Rubin's message.    Left Message for patient to call back: N\A

## 2017-08-17 NOTE — TELEPHONE ENCOUNTER
[facility] imaging  [address]   [address] [facility] called to ask if you would like a MRI of the femer instead of the MRA for the cellulitis and abscess of leg. Their recommendation is the MRI.

## 2017-08-21 ENCOUNTER — HOSPITAL ENCOUNTER (OUTPATIENT)
Dept: RADIOLOGY | Facility: MEDICAL CENTER | Age: 52
End: 2017-08-21
Attending: INTERNAL MEDICINE
Payer: COMMERCIAL

## 2017-08-21 DIAGNOSIS — L03.115 CELLULITIS OF RIGHT LOWER EXTREMITY: ICD-10-CM

## 2017-08-21 PROCEDURE — 700117 HCHG RX CONTRAST REV CODE 255: Performed by: INTERNAL MEDICINE

## 2017-08-21 PROCEDURE — A9579 GAD-BASE MR CONTRAST NOS,1ML: HCPCS | Performed by: INTERNAL MEDICINE

## 2017-08-21 PROCEDURE — 73720 MRI LWR EXTREMITY W/O&W/DYE: CPT | Mod: RT

## 2017-08-21 RX ADMIN — GADODIAMIDE 20 ML: 287 INJECTION INTRAVENOUS at 08:32

## 2017-08-22 ENCOUNTER — PATIENT MESSAGE (OUTPATIENT)
Dept: MEDICAL GROUP | Age: 52
End: 2017-08-22

## 2017-08-22 NOTE — TELEPHONE ENCOUNTER
"  From     Karine Ovalle      To     [address] [facility]      Sent     8/22/2017  2:32 PM            Grayson, I wonder when my puppy was chewing on my same leg area back in 2/2017 or whatever month  had went to the Urgent Care for the dog bit  set it off.  She was chewing on my leg as a bug had bit me...I guess bed bug that I was exposed to at a friends house. The urgent care placed me on some antibiotics name \"Augmentin Antibiotic\"... Do you think I should be on that medication?       Karine Ovalle         "

## 2017-08-24 ENCOUNTER — OFFICE VISIT (OUTPATIENT)
Dept: URGENT CARE | Facility: CLINIC | Age: 52
End: 2017-08-24
Payer: COMMERCIAL

## 2017-08-24 VITALS
HEIGHT: 62 IN | HEART RATE: 104 BPM | BODY MASS INDEX: 40.85 KG/M2 | TEMPERATURE: 98.8 F | OXYGEN SATURATION: 97 % | SYSTOLIC BLOOD PRESSURE: 144 MMHG | WEIGHT: 222 LBS | RESPIRATION RATE: 18 BRPM | DIASTOLIC BLOOD PRESSURE: 76 MMHG

## 2017-08-24 DIAGNOSIS — R60.9 EDEMA, UNSPECIFIED TYPE: ICD-10-CM

## 2017-08-24 DIAGNOSIS — L03.119 CELLULITIS OF LOWER EXTREMITY, UNSPECIFIED LATERALITY: ICD-10-CM

## 2017-08-24 PROCEDURE — 99214 OFFICE O/P EST MOD 30 MIN: CPT | Performed by: PHYSICIAN ASSISTANT

## 2017-08-24 ASSESSMENT — ENCOUNTER SYMPTOMS
NAUSEA: 0
VOMITING: 0
CHILLS: 0
CHANGE IN BOWEL HABIT: 0
MYALGIAS: 0
PALPITATIONS: 0
CLAUDICATION: 0
PND: 0
COUGH: 0
DIARRHEA: 0
LEG PAIN: 1
NECK PAIN: 0
WHEEZING: 0
SPUTUM PRODUCTION: 0
EYE DISCHARGE: 0
FEVER: 0
HEADACHES: 0
EYE REDNESS: 0
SORE THROAT: 0
ABDOMINAL PAIN: 0

## 2017-08-24 NOTE — PROGRESS NOTES
"Subjective:      Karine Ovalle is a 52 y.o. female who presents with Leg Pain          Pt is 51 y/o female who presents to . with BLE edema, worse on the right with redness, pain, and increased edema for the last few days. Pt. Has been treated for the last 2 weeks for cellulitis. She has been evaluated through the ER- negative US and CT scan revealed noted cellulitis and was started on Keflex and Bactrim. At that time she did have improvement of the redness and swelling did slightly improve. She then had continued redness/pain/swelling/edema- saw Dr. Rubin of which extended the ABX therapy and ordered an MRI- to ensure patient did not have OM- of which she did not.  Leg Pain  This is a recurrent problem. The current episode started 1 to 4 weeks ago. The problem has been waxing and waning. Pertinent negatives include no abdominal pain, change in bowel habit, chest pain, chills, congestion, coughing, fever, headaches, myalgias, nausea, neck pain, rash, sore throat, urinary symptoms or vomiting. Nothing aggravates the symptoms. Treatments tried: bactrim and keflex. The treatment provided moderate relief.       Review of Systems   Constitutional: Negative for fever, chills and malaise/fatigue.   HENT: Negative for congestion and sore throat.    Eyes: Negative for discharge and redness.   Respiratory: Negative for cough, sputum production and wheezing.    Cardiovascular: Positive for leg swelling. Negative for chest pain, palpitations, claudication and PND.   Gastrointestinal: Negative for nausea, vomiting, abdominal pain, diarrhea and change in bowel habit.   Genitourinary: Negative for dysuria and urgency.   Musculoskeletal: Negative for myalgias and neck pain.   Skin: Negative for itching and rash.   Neurological: Negative for headaches.          Objective:     /76 mmHg  Pulse 104  Temp(Src) 37.1 °C (98.8 °F)  Resp 18  Ht 1.575 m (5' 2.01\")  Wt 100.699 kg (222 lb)  BMI 40.59 kg/m2  SpO2 97%  LMP " 05/17/2017   PMH:  has a past medical history of Arthritis; Dental disorder; and Pain. She also has no past medical history of ASTHMA, Diabetes, or Breast cancer (CMS-[facility]).  MEDS:   Current outpatient prescriptions:   •  sulfamethoxazole-trimethoprim (BACTRIM DS) 800-160 MG tablet, Take 1 Tab by mouth every 12 hours for 10 days., Disp: 20 Tab, Rfl: 0  •  cephALEXin (KEFLEX) 500 MG Cap, Take 1 Cap by mouth 4 times a day., Disp: 40 Cap, Rfl: 0  •  potassium chloride ER (KLOR-CON) 10 MEQ tablet, TAKE ONE TABLET BY MOUTH ONCE DAILY, Disp: 30 Tab, Rfl: 0  •  olopatadine (PATANOL) 0.1 % ophthalmic solution, Place 1 Drop in both eyes 2 times a day., Disp: 5 mL, Rfl: 11  •  oxycodone immediate-release (ROXICODONE) 5 MG Tab, Take 1 Tab by mouth every 8 hours as needed for Severe Pain., Disp: 90 Tab, Rfl: 0  •  phentermine 37.5 MG capsule, Take 1 Cap by mouth every morning., Disp: 90 Cap, Rfl: 0  •  cyanocobalamin (VITAMIN B12) 1000 MCG Tab, Take 1 Tab by mouth every day., Disp: 90 Tab, Rfl: 4  •  Lidocaine HCl 3 % Cream, Apply cream to affected area 2 times daily as needed (Patient not taking: Reported on 7/26/2017), Disp: 1 Tube, Rfl: 0  •  meloxicam (MOBIC) 15 MG tablet, 1 tab po qday with food for djd, Disp: 90 Tab, Rfl: 1  •  furosemide (LASIX) 20 MG Tab, TAKE ONE TABLET BY MOUTH ONCE DAILY, Disp: 90 Tab, Rfl: 4  •  Omega-3 Fatty Acids (FISH OIL) 1000 MG Cap capsule, Take 3,000 mg by mouth every day., Disp: , Rfl:   •  multivitamin (THERAGRAN) Tab, Take 1 Tab by mouth every day., Disp: , Rfl:   •  vitamin D (CHOLECALCIFEROL) 1000 UNIT Tab, Take 1,000 Units by mouth every day., Disp: , Rfl:   ALLERGIES: No Known Allergies  SURGHX:   Past Surgical History   Procedure Laterality Date   • Gastric bypass laparoscopic  2002     [place]   • Abdominal exploration     • Plastic surgery  2004     excess skin on arms and legs removed   • Debridement  5/17/2012     Performed by BRADLEY DYKES at SURGERY [facility]   • Pr  enlarge breast with implant  2004   • Appendectomy laparoscopic  3/21/2013     Performed by Dali Queen M.D. at SURGERY [facility]   • Knee arthroplasty total Right 10/20/2015     Procedure: KNEE ARTHROPLASTY TOTAL;  Surgeon: Bryon Levine M.D.;  Location: SURGERY [facility];  Service:      SOCHX:  reports that she has never smoked. She has never used smokeless tobacco. She reports that she drinks about 2.0 oz of alcohol per week. She reports that she does not use illicit drugs.  FH: Family history was reviewed, no pertinent findings to report    Physical Exam   Constitutional: She is oriented to person, place, and time. She appears well-developed and well-nourished.   HENT:   Head: Normocephalic and atraumatic.   Right Ear: External ear normal.   Left Ear: External ear normal.   Nose: Nose normal.   Mouth/Throat: Oropharynx is clear and moist. No oropharyngeal exudate.   Eyes: EOM are normal. Pupils are equal, round, and reactive to light.   Neck: Normal range of motion. Neck supple.   Cardiovascular: Normal rate and regular rhythm.    No murmur heard.  Pulmonary/Chest: Effort normal. No respiratory distress.   Lymphadenopathy:     She has no cervical adenopathy.   Neurological: She is alert and oriented to person, place, and time.   Skin:        2+ pitting edema noted on the left leg.   3+ pitting edema on right lower leg with tenderness. Noted tenderness over medial induration and erythema with faint erythema line extending out to lateral calf. Without noted skin breakdown. N/V intact distally.      Psychiatric: She has a normal mood and affect. Her behavior is normal. Judgment and thought content normal.   Vitals reviewed.              Assessment/Plan:     1. Cellulitis of lower extremity, unspecified laterality  2. Edema, unspecified type    At this time patient refused any further imaging- she has had neg. Ultrasound, and neg. MRI- for OM- however was noted to have medial cellulitis- I do  believe the patient is on the appropriate ABX at this time- Keflex and Bactrim. I do believe her main issue is the edema- her legs are with 2-3+ pitting edema- she is not taking the Lasix at this time. Encouraged pt. To restart this with the potassium. Also her legs were wrapped today- after lines were marked on her leg- the most inside portion of the induration/redness, vs- the outer was the erythema.     Pt is to have recheck with us or her PCP in 24-36 hours. If NOT improving patient may need IV ABX therapy. Pt. Wants to trial the wrapping, elevation, the Lasix, and continue on current ABX at this time.

## 2017-08-24 NOTE — Clinical Note
August 24, 2017         Patient: Karine Ovalle   YOB: 1965   Date of Visit: 8/24/2017           To Whom it May Concern:    Karine Ovalle was seen in my clinic on 8/24/2017. Please excuse this patient from work due to recent illness today.     If you have any questions or concerns, please don't hesitate to call.        Sincerely,           Benjamín Ding PA-C  Electronically Signed

## 2017-08-28 DIAGNOSIS — L03.115 CELLULITIS OF RIGHT LOWER EXTREMITY: ICD-10-CM

## 2017-08-28 RX ORDER — CEPHALEXIN 500 MG/1
500 CAPSULE ORAL 4 TIMES DAILY
Qty: 40 CAP | Refills: 0 | Status: SHIPPED | OUTPATIENT
Start: 2017-08-28 | End: 2017-11-13

## 2017-08-28 RX ORDER — SULFAMETHOXAZOLE AND TRIMETHOPRIM 800; 160 MG/1; MG/1
1 TABLET ORAL EVERY 12 HOURS
Qty: 20 TAB | Refills: 0 | Status: SHIPPED | OUTPATIENT
Start: 2017-08-28 | End: 2017-09-07

## 2017-08-28 NOTE — PROGRESS NOTES
Your antibiotics were refilled; you need to be seen in the wound clinic again (make another appt) and ask them their opinion and continue your management for this with them.

## 2017-09-08 ENCOUNTER — NON-PROVIDER VISIT (OUTPATIENT)
Dept: WOUND CARE | Facility: MEDICAL CENTER | Age: 52
End: 2017-09-08
Attending: INTERNAL MEDICINE
Payer: COMMERCIAL

## 2017-09-08 PROCEDURE — 97110 THERAPEUTIC EXERCISES: CPT

## 2017-09-08 PROCEDURE — 97162 PT EVAL MOD COMPLEX 30 MIN: CPT

## 2017-09-08 NOTE — CERTIFICATION
Advanced W[facility]  [facility]  [address]  [address]  [place] [address]  [phone] Fax: [phone]      Initial Evaluation  For Certification Period:9/8/17-10/8/17        Referring Physician: Micky Rubin MD  Primary Physician:      same  Consulting Physicians:     LEORA Khan    Wound(s):B LE Lymphedema; no open wounds  Start of Care: 9/8/17       Subjective:        HPI:                 Pain:        R hip since fall 4/2017; tender to palpation medial R lower leg just prox to ankle    Past Medical History:  Past Medical History:   Diagnosis Date   • Arthritis     right knee   • Dental disorder     partial upper   • Pain     left knee and arthritis pain       Current Medications:  Current Outpatient Prescriptions:   •  cephALEXin (KEFLEX) 500 MG Cap, Take 1 Cap by mouth 4 times a day., Disp: 40 Cap, Rfl: 0  •  potassium chloride ER (KLOR-CON) 10 MEQ tablet, TAKE ONE TABLET BY MOUTH ONCE DAILY, Disp: 30 Tab, Rfl: 0  •  olopatadine (PATANOL) 0.1 % ophthalmic solution, Place 1 Drop in both eyes 2 times a day., Disp: 5 mL, Rfl: 11  •  oxycodone immediate-release (ROXICODONE) 5 MG Tab, Take 1 Tab by mouth every 8 hours as needed for Severe Pain., Disp: 90 Tab, Rfl: 0  •  phentermine 37.5 MG capsule, Take 1 Cap by mouth every morning., Disp: 90 Cap, Rfl: 0  •  cyanocobalamin (VITAMIN B12) 1000 MCG Tab, Take 1 Tab by mouth every day., Disp: 90 Tab, Rfl: 4  •  Lidocaine HCl 3 % Cream, Apply cream to affected area 2 times daily as needed (Patient not taking: Reported on 7/26/2017), Disp: 1 Tube, Rfl: 0  •  meloxicam (MOBIC) 15 MG tablet, 1 tab po qday with food for djd, Disp: 90 Tab, Rfl: 1  •  furosemide (LASIX) 20 MG Tab, TAKE ONE TABLET BY MOUTH ONCE DAILY, Disp: 90 Tab, Rfl: 4  •  Omega-3 Fatty Acids (FISH OIL) 1000 MG Cap capsule, Take 3,000 mg by mouth every day., Disp: , Rfl:   •  multivitamin (THERAGRAN) Tab, Take 1 Tab by mouth every day., Disp: , Rfl:   •  vitamin D  (CHOLECALCIFEROL) 1000 UNIT Tab, Take 1,000 Units by mouth every day., Disp: , Rfl:     Allergies: Review of patient's allergies indicates no known allergies.    Past Surgical History:   Past Surgical History:   Procedure Laterality Date   • KNEE ARTHROPLASTY TOTAL Right 10/20/2015    Procedure: KNEE ARTHROPLASTY TOTAL;  Surgeon: Bryon Levine M.D.;  Location: SURGERY [facility];  Service:    • APPENDECTOMY LAPAROSCOPIC  3/21/2013    Performed by Dlai Queen M.D. at SURGERY [facility]   • DEBRIDEMENT  5/17/2012    Performed by BRADLEY DYKES at SURGERY [facility]   • PLASTIC SURGERY  2004    excess skin on arms and legs removed   • PB ENLARGE BREAST WITH IMPLANT  2004   • GASTRIC BYPASS LAPAROSCOPIC  2002    [place]   • ABDOMINAL EXPLORATION         Social History:    Social History     Social History   • Marital status: Single     Spouse name: N/A   • Number of children: N/A   • Years of education: N/A     Occupational History   • Not on file.     Social History Main Topics   • Smoking status: Never Smoker   • Smokeless tobacco: Never Used   • Alcohol use 2.0 oz/week     4 Glasses of wine per week      Comment: 4 drinks a week   • Drug use: No   • Sexual activity: No     Other Topics Concern   • Not on file     Social History Narrative    ** Merged History Encounter **                Objective:      Tests and Measures:  Pulses: triphasic, audible w/Doppler  EVELYNE: PT, DP 1.5 bilaterally    Fall Risk Assessment (lynn all that apply with an X):(+) fall risk 9/8/17   65 years or older     xFall within the last 2 years, uses   Ambulatory devices  Loss of protective sensation in feet,   Use of prostethic/orthotic, years    Presence of lower extremity/foot/toe amputation   xTaking medication that increases risk (per facility policy)    Interventions Recommended (if any of the above are selected):   Use of Assistive Device:________________________   Supervision with ambulation:   Caregiver   Assistance with ambulation:  Caregiver   xHome safety education:  Educational material provided    Orthotic, protective, supportive devices: none     Wound Characteristics                                                    Location:  No open wounds Initial Evaluation  Date:9/8/17   Tissue Type and %:    Periwound:    Drainage:    Exposed structures    Wound Edges:      Odor:    S&S of Infection:   none   Edema: B LE lymphedema   Sensation:                Measurements:no girth arnold taken this visit   Length (cm)   Width (cm)   Depth (cm)   Area (cm2)   Tract/undermine      Procedures:     Debridement :     Cleansed with:  Foam cleanser                                                                        Periwound protected with:sween cream   Primary dressing:tubi F bilateral LE   Secondary Dressing:   Other: ther ex 15'    Patient Education: Pt  instructed in wound care POC,  effective LE edema control strategies, incl diet, ther ex as follows: seated ankle P/D, LAQ w/ ankle P/D, hip flex; standing calf raises, marching. Instr pt to perform 30 reps 3x/day. Written instructions provided.  Instructed in compression garment precautions,to remove compression stockings during the night, and to check frequently throughout the day for folds and wrinkles.  Pt verbalized understanding.    Professional Collaboration: Initial assessment sent to [facility] provider,  LEORA Khan via EPIC. Consulted w/M LEORA Kyes re lymphedema referral, arterial studies.      Assessment:     Moderate complexity PT eval completed:   PT Evaluation 11696  Reference:  History: 19911  Exam of body systems: 30095  Clinical presentation: 96690    Wound etiology: lymphedema B LE    Wound Progress: Initial assessment; progress to be determined     Rationale for Treatment:referral to lymphedema care    Patient tolerance/compliance: Pt verbalized understanding of initial instructions and education; consented to POC    Complicating  factors:lymphedema, EVELYNE>1 indicating necessity for arterial study prior to initiating compression    Need for ongoing Advanced Wound Care services:Pt requires referral for lymphedema assessment and treatment      Plan:      Treatment Plan and Recommendations:    Diagnosis/ICD10: I89.0    Procedures/CPT:evaluation    Frequency: TBD    Treatment Goals: Lymphedema evaluation    At the time of each visit a thorough assessment of the patient is completed to assure the  appropriateness of our plan of care.  The dressings or modalities may need to be adapted   from the original plan to address any significant changes in the wound environment.      Clinician Signature:_______Luz ReedPT___________Date___9/8/18________      Physician Signature:______________________________Date:__________________

## 2017-09-11 ENCOUNTER — TELEPHONE (OUTPATIENT)
Dept: MEDICAL GROUP | Age: 52
End: 2017-09-11

## 2017-09-11 NOTE — TELEPHONE ENCOUNTER
ESTABLISHED PATIENT PRE-VISIT PLANNING     Note: Patient will not be contacted if there is no indication to call.     1.  Reviewed notes from the last few office visits within the medical group: Yes    2.  If any orders were placed at last visit or intended to be done for this visit (i.e. 6 mos follow-up), do we have Results/Consult Notes?        •  Labs - Labs were not ordered at last office visit.       •  Imaging - Imaging was not ordered at last office visit.       •  Referrals - Referral ordered, patient has NOT been seen.    3. Is this appointment scheduled as a Hospital Follow-Up? No    4.  Immunizations were updated in [employer] using WebIZ?: Yes       •  Web Iz Recommendations: FLU and PNEUMOVAX (PPSV23)    5.  Patient is due for the following Health Maintenance Topics:   Health Maintenance Due   Topic Date Due   • IMM PNEUMOCOCCAL 19-64 (ADULT) MEDIUM RISK SERIES (1 of 1 - PPSV23) 05/17/1984   • COLONOSCOPY  05/17/2015   • PAP SMEAR  07/09/2016   • IMM INFLUENZA (1) 09/01/2017       - Patient has completed TDAP Immunization(s) per WebIZ. Chart has been updated.      6.  Patient was NOT informed to arrive 15 min prior to their scheduled appointment and bring in their medication bottles.

## 2017-09-12 ENCOUNTER — NON-PROVIDER VISIT (OUTPATIENT)
Dept: WOUND CARE | Facility: MEDICAL CENTER | Age: 52
End: 2017-09-12
Attending: INTERNAL MEDICINE
Payer: COMMERCIAL

## 2017-09-12 ENCOUNTER — OFFICE VISIT (OUTPATIENT)
Dept: MEDICAL GROUP | Age: 52
End: 2017-09-12
Payer: COMMERCIAL

## 2017-09-12 VITALS
SYSTOLIC BLOOD PRESSURE: 138 MMHG | BODY MASS INDEX: 41.41 KG/M2 | HEIGHT: 62 IN | HEART RATE: 72 BPM | WEIGHT: 225 LBS | RESPIRATION RATE: 18 BRPM | OXYGEN SATURATION: 93 % | DIASTOLIC BLOOD PRESSURE: 66 MMHG | TEMPERATURE: 98.2 F

## 2017-09-12 DIAGNOSIS — Z12.11 COLON CANCER SCREENING: ICD-10-CM

## 2017-09-12 DIAGNOSIS — Z00.00 HEALTHCARE MAINTENANCE: ICD-10-CM

## 2017-09-12 DIAGNOSIS — L03.119 CELLULITIS AND ABSCESS OF LEG: ICD-10-CM

## 2017-09-12 DIAGNOSIS — G89.4 CHRONIC PAIN SYNDROME: ICD-10-CM

## 2017-09-12 DIAGNOSIS — L02.419 CELLULITIS AND ABSCESS OF LEG: ICD-10-CM

## 2017-09-12 DIAGNOSIS — E66.9 OBESITY (BMI 30-39.9): ICD-10-CM

## 2017-09-12 DIAGNOSIS — E66.01 SEVERE OBESITY (BMI >= 40) (HCC): ICD-10-CM

## 2017-09-12 DIAGNOSIS — Z02.9 ADMINISTRATIVE ENCOUNTER: ICD-10-CM

## 2017-09-12 DIAGNOSIS — R60.0 EDEMA OF BOTH LEGS: ICD-10-CM

## 2017-09-12 DIAGNOSIS — J45.20 MILD INTERMITTENT ASTHMA WITHOUT COMPLICATION: ICD-10-CM

## 2017-09-12 PROBLEM — M79.9 LESION OF SOFT TISSUE OF LOWER LEG AND ANKLE: Status: RESOLVED | Noted: 2017-07-26 | Resolved: 2017-09-12

## 2017-09-12 PROBLEM — M25.473 SWELLING OF ANKLE: Status: RESOLVED | Noted: 2017-07-26 | Resolved: 2017-09-12

## 2017-09-12 PROBLEM — G89.29 CHRONIC BILATERAL LOW BACK PAIN WITH BILATERAL SCIATICA: Status: ACTIVE | Noted: 2017-07-26

## 2017-09-12 PROBLEM — M54.42 CHRONIC BILATERAL LOW BACK PAIN WITH BILATERAL SCIATICA: Status: ACTIVE | Noted: 2017-07-26

## 2017-09-12 PROBLEM — M25.551 ACUTE RIGHT HIP PAIN: Status: RESOLVED | Noted: 2017-07-26 | Resolved: 2017-09-12

## 2017-09-12 PROBLEM — S93.401A SPRAIN OF RIGHT ANKLE: Status: RESOLVED | Noted: 2017-07-26 | Resolved: 2017-09-12

## 2017-09-12 PROBLEM — M54.41 CHRONIC BILATERAL LOW BACK PAIN WITH BILATERAL SCIATICA: Status: ACTIVE | Noted: 2017-07-26

## 2017-09-12 PROCEDURE — 99215 OFFICE O/P EST HI 40 MIN: CPT | Performed by: INTERNAL MEDICINE

## 2017-09-12 PROCEDURE — 29581 APPL MULTLAYER CMPRN SYS LEG: CPT

## 2017-09-12 RX ORDER — MORPHINE SULFATE 15 MG/1
15 TABLET, FILM COATED, EXTENDED RELEASE ORAL EVERY 12 HOURS
COMMUNITY
End: 2018-01-31

## 2017-09-12 RX ORDER — PHENTERMINE HYDROCHLORIDE 37.5 MG/1
37.5 CAPSULE ORAL EVERY MORNING
Qty: 90 CAP | Refills: 1 | Status: SHIPPED | OUTPATIENT
Start: 2017-09-12 | End: 2018-01-31

## 2017-09-12 RX ORDER — POTASSIUM CHLORIDE 750 MG/1
10 TABLET, FILM COATED, EXTENDED RELEASE ORAL DAILY
Qty: 90 TAB | Refills: 4 | Status: SHIPPED | OUTPATIENT
Start: 2017-09-12 | End: 2017-11-30 | Stop reason: SDUPTHER

## 2017-09-12 RX ORDER — OXYCODONE AND ACETAMINOPHEN 10; 325 MG/1; MG/1
1-2 TABLET ORAL EVERY 4 HOURS PRN
COMMUNITY
End: 2018-01-31 | Stop reason: SDUPTHER

## 2017-09-12 RX ORDER — FUROSEMIDE 20 MG/1
20 TABLET ORAL DAILY
Qty: 90 TAB | Refills: 4 | Status: SHIPPED | OUTPATIENT
Start: 2017-09-12 | End: 2017-11-30 | Stop reason: SDUPTHER

## 2017-09-12 ASSESSMENT — ENCOUNTER SYMPTOMS
NEUROLOGICAL NEGATIVE: 1
MUSCULOSKELETAL NEGATIVE: 1
CONSTITUTIONAL NEGATIVE: 1
RESPIRATORY NEGATIVE: 1
GASTROINTESTINAL NEGATIVE: 1
EYES NEGATIVE: 1
CARDIOVASCULAR NEGATIVE: 1
PSYCHIATRIC NEGATIVE: 1

## 2017-09-12 ASSESSMENT — PAIN SCALES - GENERAL: PAINLEVEL: 9=SEVERE PAIN

## 2017-09-12 NOTE — WOUND TEAM
[facility]  [facility]  [address]  [address]  [place] [address]  [phone] Fax: [phone]      Initial Evaluation  For Certification Period:9/8/17-10/8/17        Referring Physician: Micky Rubin MD  Primary Physician:      same  Consulting Physicians:     LEORA Khan    Wound(s):B LE Lymphedema; no open wounds  Start of Care: 9/8/17       Subjective:        HPI:       Pt was referred to the wound clinic for cellulitis of the right LE.     Pt was seen by her physician on 8/24/17 with bilateral leg swelling and redness on the right.  Pt was started on Keflex and bactrim.  US was negative for DVT.  Notes state that leg swellng is a recurrent problem.   Pt has a hx of R TKR.  Pt had a fall 7/10/17 with right ankle injury.  Pt also fell 4/14 mopping resulting in hip pain.  Pt tripped on 2/15/17 injuring her left knee.  Pt states edema has become worse in 4/2017.  Pt received PT due to falls in 6/2017  Pain:        R hip since fall 4/2017; tender to palpation medial R lower leg just prox to ankle    Past Medical History:  Past Medical History:   Diagnosis Date   • Arthritis     right knee   • Dental disorder     partial upper   • Pain     left knee and arthritis pain       Current Medications:  Current Outpatient Prescriptions:   •  cephALEXin (KEFLEX) 500 MG Cap, Take 1 Cap by mouth 4 times a day., Disp: 40 Cap, Rfl: 0  •  potassium chloride ER (KLOR-CON) 10 MEQ tablet, TAKE ONE TABLET BY MOUTH ONCE DAILY, Disp: 30 Tab, Rfl: 0  •  olopatadine (PATANOL) 0.1 % ophthalmic solution, Place 1 Drop in both eyes 2 times a day., Disp: 5 mL, Rfl: 11  •  oxycodone immediate-release (ROXICODONE) 5 MG Tab, Take 1 Tab by mouth every 8 hours as needed for Severe Pain., Disp: 90 Tab, Rfl: 0  •  phentermine 37.5 MG capsule, Take 1 Cap by mouth every morning., Disp: 90 Cap, Rfl: 0  •  cyanocobalamin (VITAMIN B12) 1000 MCG Tab, Take 1 Tab by mouth every day., Disp: 90 Tab, Rfl: 4  •   Lidocaine HCl 3 % Cream, Apply cream to affected area 2 times daily as needed (Patient not taking: Reported on 7/26/2017), Disp: 1 Tube, Rfl: 0  •  meloxicam (MOBIC) 15 MG tablet, 1 tab po qday with food for djd, Disp: 90 Tab, Rfl: 1  •  furosemide (LASIX) 20 MG Tab, TAKE ONE TABLET BY MOUTH ONCE DAILY, Disp: 90 Tab, Rfl: 4  •  Omega-3 Fatty Acids (FISH OIL) 1000 MG Cap capsule, Take 3,000 mg by mouth every day., Disp: , Rfl:   •  multivitamin (THERAGRAN) Tab, Take 1 Tab by mouth every day., Disp: , Rfl:   •  vitamin D (CHOLECALCIFEROL) 1000 UNIT Tab, Take 1,000 Units by mouth every day., Disp: , Rfl:     Allergies: Review of patient's allergies indicates no known allergies.    Past Surgical History:   Past Surgical History:   Procedure Laterality Date   • KNEE ARTHROPLASTY TOTAL Right 10/20/2015    Procedure: KNEE ARTHROPLASTY TOTAL;  Surgeon: Bryon Levine M.D.;  Location: SURGERY [facility];  Service:    • APPENDECTOMY LAPAROSCOPIC  3/21/2013    Performed by Dali Queen M.D. at SURGERY [facility]   • DEBRIDEMENT  5/17/2012    Performed by BRADLEY DYKES at SURGERY [facility]   • PLASTIC SURGERY  2004    excess skin on arms and legs removed   • PB ENLARGE BREAST WITH IMPLANT  2004   • GASTRIC BYPASS LAPAROSCOPIC  2002    [place]   • ABDOMINAL EXPLORATION         Social History:    Social History     Social History   • Marital status: Single     Spouse name: N/A   • Number of children: N/A   • Years of education: N/A     Occupational History   • Not on file.     Social History Main Topics   • Smoking status: Never Smoker   • Smokeless tobacco: Never Used   • Alcohol use 2.0 oz/week     4 Glasses of wine per week      Comment: 4 drinks a week   • Drug use: No   • Sexual activity: No     Other Topics Concern   • Not on file     Social History Narrative    ** Merged History Encounter **        Pt has 21 steps to her apt.        Objective:      Tests and Measures:  4/24/17 xray of right hip  neg for fx  7/27/17 x-ray showed only mild lucency int the medial aspect of the talar dome that could be related to an osteochondral lesion  8/7/17 EKG - left ventricular hypertrophy, sinus rhythm  8/7/17 venous study neg for DVT  8/7/17 CT findings - edema at the R ankle joint, dermal thickening, no fluid collection  8/21/17 MRI neg for abscess or bone abnormality  Pulses: triphasic, audible w/Doppler  EVELYNE: PT, DP 1.5 bilaterally    Fall Risk Assessment (lynn all that apply with an X):(+) fall risk 9/8/17   65 years or older     xFall within the last 2 years, uses   Ambulatory devices  Loss of protective sensation in feet,   Use of prostethic/orthotic, years    Presence of lower extremity/foot/toe amputation   xTaking medication that increases risk (per facility policy)    Interventions Recommended (if any of the above are selected):   Use of Assistive Device:________________________   Supervision with ambulation:  Caregiver   Assistance with ambulation:  Caregiver   xHome safety education:  Educational material provided    Orthotic, protective, supportive devices: none     Wound Characteristics                                                    Location:  No open wounds Initial Evaluation  Date:9/8/17   skin Erythema gaiter distribution   tissue Fibrosclerosis bilateral gaiter distribution R>L  10x10 patch of thick dermis right medial leg                   S&S of Infection:   none   Edema: B LE lymphedema   Sensation:                Measurements:no girth arnold taken this visit 9/12/17  R       L           calf 50.5   51.8   ankle 27.1   28.0   foot 22.0   23.0      Procedures:  Multilayer compression bilateral legs  coflex 2 layer applied to bilateral legs  Briefly reviewed HEP  Garments - discussed the need for long term management with bilateral knee high stockings.  Will also discuss capris     Patient Education:  9/12/17 keep bandages in place - do not get wet in the shower - remove if painful - what to expect  with consistent compression therapy.  Discussed comprilan bandaging that pt can change daily at home.   9/8/17 Pt  instructed in wound care POC,  effective LE edema control strategies, incl diet, ther ex as follows: seated ankle P/D, LAQ w/ ankle P/D, hip flex; standing calf raises, marching. Instr pt to perform 30 reps 3x/day. Written instructions provided.  Instructed in compression garment precautions,to remove compression stockings during the night, and to check frequently throughout the day for folds and wrinkles.  Pt verbalized understanding.    Professional Collaboration: Initial assessment sent to [facility] provider,  LEORA Khan via [employer]. Consulted w/M LEORA Keys re lymphedema referral, arterial studies.      Assessment:       Wound etiology: lymphedema/lipidema B LE    Wound Progress:  Expect pt to respond quickly to compression wraps    Rationale for Treatment:referral to lymphedema care    Patient tolerance/compliance: Pt verbalized understanding of initial instructions and education; consented to POC    Complicating factors:lymphedema, EVELYNE>1 indicating necessity for arterial study prior to initiating compression    Need for ongoing Advanced Wound Care services:Pt requires referral for lymphedema assessment and treatment      Plan:      Treatment Plan and Recommendations:    Diagnosis/ICD10: I89.0    Procedures/CPT:evaluation    Frequency: 2x/week    Treatment Goals:   1. Reduce girth 2 cm at ankles  2. Brawny areas soften  3. No erythema  4. I with HEP  5. I with self bandaging  Lymphedema evaluation    At the time of each visit a thorough assessment of the patient is completed to assure the  appropriateness of our plan of care.  The dressings or modalities may need to be adapted   from the original plan to address any significant changes in the wound environment.      Clinician Signature:_________________Date___________      Physician  Signature:______________________________Date:__________________

## 2017-09-13 NOTE — PROGRESS NOTES
Subjective:      Karine Ovalle is a 52 y.o. female who presents with Other (diet,waterpills,FMLA paper work, wound care)  Patient needs optimally before because of frequent missing work because of her right lower leg cellulitis and abscess treated at the clinic. She is seen there for dressing changes twice a week and we'll have to continue going there for several months. She also misses work because of chronic low back pain with frequent flareups occurring once a week to once a month on average.    She also needs refills of her diuretics for her venous insufficiency and fluid retention and refill of her phentermine for her obesity.    And   The patient is here for followup of chronic medical problems listed below. The patient is compliant with medications and having no side effects from them. Denies chest pain, abdominal pain, dyspnea, myalgias, or cough.   Patient Active Problem List    Diagnosis Date Noted   • Mild intermittent asthma without complication 04/25/2012     Priority: High   • Cellulitis and abscess of leg- right lower inner- wound clinic 08/09/2017   • Chronic use of opiate drugs therapeutic purposes 08/09/2017   • Colon cancer screening- needs 08/09/2017   • Healthcare maintenance-needs Pap smear and colon cancer screening 08/09/2017   • Sprain of right ankle 07/26/2017   • Swelling of ankle 07/26/2017   • Administrative encounter- FMLA paperwork fill out with pt asst 07/26/2017   • Acute right hip pain- traumatic 07/26/2017   • Acute low back pain due to trauma 07/26/2017   • Chronic pain syndrome- nv adv pain, dr sanders 06/08/2017   • Chronic use of opiate for therapeutic purpose- nv adv pain dr sanders following 06/08/2017   • Severe obesity (BMI >= 40) (CMS-HCC) 10/21/2016   • Vitamin B 12 deficiency 06/02/2016   • Weight gain status post gastric bypass- in [place] 2002; was 320 lbs- got down to 155 08/12/2014   • Fibroids 08/13/2013   • Primary osteoarthritis of right knee- sp right TKR  "2015 - dr nowak 08/13/2013       Outpatient Medications Prior to Visit   Medication Sig Dispense Refill   • cephALEXin (KEFLEX) 500 MG Cap Take 1 Cap by mouth 4 times a day. 40 Cap 0   • olopatadine (PATANOL) 0.1 % ophthalmic solution Place 1 Drop in both eyes 2 times a day. 5 mL 11   • cyanocobalamin (VITAMIN B12) 1000 MCG Tab Take 1 Tab by mouth every day. 90 Tab 4   • Lidocaine HCl 3 % Cream Apply cream to affected area 2 times daily as needed 1 Tube 0   • meloxicam (MOBIC) 15 MG tablet 1 tab po qday with food for djd 90 Tab 1   • Omega-3 Fatty Acids (FISH OIL) 1000 MG Cap capsule Take 3,000 mg by mouth every day.     • multivitamin (THERAGRAN) Tab Take 1 Tab by mouth every day.     • vitamin D (CHOLECALCIFEROL) 1000 UNIT Tab Take 1,000 Units by mouth every day.     • potassium chloride ER (KLOR-CON) 10 MEQ tablet TAKE ONE TABLET BY MOUTH ONCE DAILY 30 Tab 0   • oxycodone immediate-release (ROXICODONE) 5 MG Tab Take 1 Tab by mouth every 8 hours as needed for Severe Pain. 90 Tab 0   • phentermine 37.5 MG capsule Take 1 Cap by mouth every morning. 90 Cap 0   • furosemide (LASIX) 20 MG Tab TAKE ONE TABLET BY MOUTH ONCE DAILY 90 Tab 4     No facility-administered medications prior to visit.      No Known Allergies            HPI    Review of Systems   Constitutional: Negative.    HENT: Negative.    Eyes: Negative.    Respiratory: Negative.    Cardiovascular: Negative.    Gastrointestinal: Negative.    Genitourinary: Negative.    Musculoskeletal: Negative.    Skin: Negative.    Neurological: Negative.    Endo/Heme/Allergies: Negative.    Psychiatric/Behavioral: Negative.           Objective:     /66   Pulse 72   Temp 36.8 °C (98.2 °F)   Resp 18   Ht 1.575 m (5' 2\")   Wt 102.1 kg (225 lb)   LMP 05/01/2017   SpO2 93%   Breastfeeding? No   BMI 41.15 kg/m²      Physical Exam   Constitutional: She is oriented to person, place, and time. She appears well-developed and well-nourished. No distress.   HENT: "   Head: Normocephalic and atraumatic.   Right Ear: External ear normal.   Left Ear: External ear normal.   Nose: Nose normal.   Mouth/Throat: Oropharynx is clear and moist. No oropharyngeal exudate.   Eyes: Conjunctivae and EOM are normal. Pupils are equal, round, and reactive to light. Right eye exhibits no discharge. Left eye exhibits no discharge. No scleral icterus.   Neck: Normal range of motion. Neck supple. No JVD present. No tracheal deviation present. No thyromegaly present.   Cardiovascular: Normal rate, regular rhythm, normal heart sounds and intact distal pulses.  Exam reveals no gallop and no friction rub.    No murmur heard.  Pulmonary/Chest: Effort normal and breath sounds normal. No stridor. No respiratory distress. She has no wheezes. She has no rales. She exhibits no tenderness.   Abdominal: Soft. Bowel sounds are normal. She exhibits no distension and no mass. There is no tenderness. There is no rebound and no guarding.   Musculoskeletal: Normal range of motion. She exhibits no edema or tenderness.   Lymphadenopathy:     She has no cervical adenopathy.   Neurological: She is alert and oriented to person, place, and time. She has normal reflexes. She displays normal reflexes. No cranial nerve deficit. She exhibits normal muscle tone. Coordination normal.   Skin: Skin is warm and dry. No rash noted. She is not diaphoretic. No erythema. No pallor.   Psychiatric: She has a normal mood and affect. Her behavior is normal. Judgment and thought content normal.   Vitals reviewed.    No visits with results within 1 Month(s) from this visit.   Latest known visit with results is:   Hospital Outpatient Visit on 08/09/2017   Component Date Value   • Urine Amphetamine-Metham* 08/10/2017 Positive    • Barbiturates 08/10/2017 Negative    • Benzodiazepines 08/10/2017 Negative    • Buprenorphine, Urn, Scrn 08/10/2017 Negative    • Carisoprodol, Urn, Scrn 08/10/2017 Negative    • Cocaine Metabolite 08/10/2017  Negative    • Ethyl Glucuronide Scrn, * 08/10/2017 Negative    • Fentanyl, Urn, Scrn 08/10/2017 Negative    • Meperidine, Urn, Scrn 08/10/2017 Negative    • Methadone 08/10/2017 Negative    • Opiates, Urn, Scrn 08/10/2017 Positive    • Oxycodone/Oxymorphone, U* 08/10/2017 Positive    • Phencyclidine -Pcp 08/10/2017 Negative    • Propoxyphene 08/10/2017 Negative    • Tapentadol, Urn, Scrn 08/10/2017 Negative    • Cannabinoid Metab 08/10/2017 Negative    • Tramadol, Urn, Scrn 08/10/2017 Negative    • Zolpidem, Urn, Scrn 08/10/2017 Negative    • Scrn, Urine Interpretati* 08/10/2017 See Note    • Opiates, Ur, Hydrocodone 08/12/2017 <20    • Opiates, Ur, Hydromorpho* 08/12/2017 <20    • Opiates, Ur, Codeine 08/12/2017 <20    • Opiates, Ur, Morphine 08/12/2017 <20    • Opiates, Ur, 6_AM 08/12/2017 <10    • Opiates, Ur, Noroxycodone 08/12/2017 >4000    • Opiates, Ur, Noroxymorph* 08/12/2017 223    • Opiates, Ur, Oxycodone 08/12/2017 2179    • Opiates, Ur, Oxymorphone 08/12/2017 46    • Opiates, Ur, Norhydrocod* 08/12/2017 <20    • Amphetamine, Urine 08/14/2017 <200    • Methamphetamine, Urine 08/14/2017 <200    • Methylenedioxyamphetamin* 08/14/2017 <200    • Methylenedioxymethamphet* 08/14/2017 <200    • Methylenedioxyethylamphe* 08/14/2017 <200       Lab Results   Component Value Date/Time    HBA1C 5.3 06/12/2017 09:03 AM     Lab Results   Component Value Date/Time    SODIUM 134 (L) 06/12/2017 09:02 AM    POTASSIUM 3.9 06/12/2017 09:02 AM    CHLORIDE 102 06/12/2017 09:02 AM    CO2 26 06/12/2017 09:02 AM    GLUCOSE 96 06/12/2017 09:02 AM    BUN 11 06/12/2017 09:02 AM    CREATININE 0.75 06/12/2017 09:02 AM    CREATININE 0.88 08/14/2010 12:00 AM    BUNCREATRAT 13 08/14/2010 12:00 AM    GLOMRATE >59 08/14/2010 12:00 AM    ALKPHOSPHAT 100 (H) 06/12/2017 09:02 AM    ASTSGOT 18 06/12/2017 09:02 AM    ALTSGPT 10 06/12/2017 09:02 AM    TBILIRUBIN 0.7 06/12/2017 09:02 AM     Lab Results   Component Value Date/Time    INR 1.04  05/18/2012 12:20 PM     Lab Results   Component Value Date/Time    CHOLSTRLTOT 144 06/12/2017 09:02 AM    LDL 63 06/12/2017 09:02 AM    HDL 69 06/12/2017 09:02 AM    TRIGLYCERIDE 60 06/12/2017 09:02 AM       No results found for: TESTOSTERONE  Lab Results   Component Value Date/Time    TSH 2.110 08/14/2010 12:00 AM     Lab Results   Component Value Date/Time    FREET4 0.72 05/03/2016 07:32 AM     Lab Results   Component Value Date/Time    URICACID 5.6 08/12/2014 04:49 PM     No components found for: VITB12  Lab Results   Component Value Date/Time    25HYDROXY 33 08/12/2014 04:49 PM    25HYDROXY 25 (L) 07/03/2012 09:20 AM                Assessment/Plan:     1. Cellulitis and abscess of leg- right lower inner- wound clinic   Under good control. Continue same regimen. FMLA paper work filled out with the patient input  2. Severe obesity (BMI >= 40) (CMS-HCC)       3. Mild intermittent asthma without complication    Under good control. Continue same regimen.      4. Chronic pain syndrome- nv adv pain, dr sanders     Under good control. Continue same regimen.  - oxycodone-acetaminophen (PERCOCET-10)  MG Tab; Take 1-2 Tabs by mouth every four hours as needed for Severe Pain.  - morphine ER (MS CONTIN) 15 MG Tab CR tablet; Take 15 mg by mouth every 12 hours.  - Controlled Substance Treatment Agreement    5. Colon cancer screening- needs     - REFERRAL TO GASTROENTEROLOGY  - OCCULT BLOOD FECES IMMUNOASSAY; Future  - REFERRAL TO GI FOR COLONOSCOPY  - OCCULT BLOOD FECES IMMUNOASSAY (FIT); Future    6. Healthcare maintenance-needs Pap smear        - REFERRAL TO OB/GYN SURGERY    7. Obesity (BMI 30-39.9)     diet/exercise/lose 15 lbs.; patient counseled    - phentermine 37.5 MG capsule; Take 1 Cap by mouth every morning.  Dispense: 90 Cap; Refill: 1         9. Edema of both legs          Under good control. Continue same regimen.  - furosemide (LASIX) 20 MG Tab; Take 1 Tab by mouth every day.  Dispense: 90 Tab; Refill:  4  - potassium chloride ER (KLOR-CON) 10 MEQ tablet; Take 1 Tab by mouth every day.  Dispense: 90 Tab; Refill: 4         40 minute face-to-face encounter took place today.  More than half of this time was spent in the coordination of care of the above problems, as well as counseling.

## 2017-09-18 ENCOUNTER — NON-PROVIDER VISIT (OUTPATIENT)
Dept: WOUND CARE | Facility: MEDICAL CENTER | Age: 52
End: 2017-09-18
Attending: INTERNAL MEDICINE
Payer: COMMERCIAL

## 2017-09-18 PROCEDURE — 29581 APPL MULTLAYER CMPRN SYS LEG: CPT

## 2017-09-18 PROCEDURE — 97140 MANUAL THERAPY 1/> REGIONS: CPT

## 2017-09-18 NOTE — WOUND TEAM
[facility]  [facility]  [address]  [address]  [place] [address]  [phone] Fax: [phone]      Encounter  For Certification Period:9/8/17-10/8/17        Referring Physician: Micky Rubin MD  Primary Physician:      same  Consulting Physicians:     LEORA Khan    Wound(s):B LE Lymphedema; no open wounds  Start of Care: 9/8/17       Subjective:        HPI:       Pt was referred to the wound clinic for cellulitis of the right LE.     Pt was seen by her physician on 8/24/17 with bilateral leg swelling and redness on the right.  Pt was started on Keflex and bactrim.  US was negative for DVT.  Notes state that leg swellng is a recurrent problem.   Pt has a hx of R TKR.  Pt had a fall 7/10/17 with right ankle injury.  Pt also fell 4/14 mopping resulting in hip pain.  Pt tripped on 2/15/17 injuring her left knee.  Pt states edema has become worse in 4/2017.  Pt received PT due to falls in 6/2017  Pain:        R hip since fall 4/2017; tender to palpation medial R lower leg just prox to ankle    Past Medical History:  Past Medical History:   Diagnosis Date   • Arthritis     right knee   • Dental disorder     partial upper   • Pain     left knee and arthritis pain       Current Medications:  Current Outpatient Prescriptions:   •  oxycodone-acetaminophen (PERCOCET-10)  MG Tab, Take 1-2 Tabs by mouth every four hours as needed for Severe Pain., Disp: , Rfl:   •  morphine ER (MS CONTIN) 15 MG Tab CR tablet, Take 15 mg by mouth every 12 hours., Disp: , Rfl:   •  phentermine 37.5 MG capsule, Take 1 Cap by mouth every morning., Disp: 90 Cap, Rfl: 1  •  furosemide (LASIX) 20 MG Tab, Take 1 Tab by mouth every day., Disp: 90 Tab, Rfl: 4  •  potassium chloride ER (KLOR-CON) 10 MEQ tablet, Take 1 Tab by mouth every day., Disp: 90 Tab, Rfl: 4  •  cephALEXin (KEFLEX) 500 MG Cap, Take 1 Cap by mouth 4 times a day., Disp: 40 Cap, Rfl: 0  •  olopatadine (PATANOL) 0.1 % ophthalmic  solution, Place 1 Drop in both eyes 2 times a day., Disp: 5 mL, Rfl: 11  •  oxycodone immediate-release (ROXICODONE) 5 MG Tab, Take 1 Tab by mouth every 8 hours as needed for Severe Pain., Disp: 90 Tab, Rfl: 0  •  cyanocobalamin (VITAMIN B12) 1000 MCG Tab, Take 1 Tab by mouth every day., Disp: 90 Tab, Rfl: 4  •  Lidocaine HCl 3 % Cream, Apply cream to affected area 2 times daily as needed, Disp: 1 Tube, Rfl: 0  •  meloxicam (MOBIC) 15 MG tablet, 1 tab po qday with food for djd, Disp: 90 Tab, Rfl: 1  •  Omega-3 Fatty Acids (FISH OIL) 1000 MG Cap capsule, Take 3,000 mg by mouth every day., Disp: , Rfl:   •  multivitamin (THERAGRAN) Tab, Take 1 Tab by mouth every day., Disp: , Rfl:   •  vitamin D (CHOLECALCIFEROL) 1000 UNIT Tab, Take 1,000 Units by mouth every day., Disp: , Rfl:     Allergies: Review of patient's allergies indicates no known allergies.    Past Surgical History:   Past Surgical History:   Procedure Laterality Date   • KNEE ARTHROPLASTY TOTAL Right 10/20/2015    Procedure: KNEE ARTHROPLASTY TOTAL;  Surgeon: Bryon Levine M.D.;  Location: [facility];  Service:    • APPENDECTOMY LAPAROSCOPIC  3/21/2013    Performed by Dali Queen M.D. at [facility]   • DEBRIDEMENT  5/17/2012    Performed by BRADLEY DYKES at [facility]   • PLASTIC SURGERY  2004    excess skin on arms and legs removed   • PB ENLARGE BREAST WITH IMPLANT  2004   • GASTRIC BYPASS LAPAROSCOPIC  2002    [place]   • ABDOMINAL EXPLORATION         Social History:    Social History     Social History   • Marital status: Single     Spouse name: N/A   • Number of children: N/A   • Years of education: N/A     Occupational History   • Not on file.     Social History Main Topics   • Smoking status: Never Smoker   • Smokeless tobacco: Never Used   • Alcohol use 2.0 oz/week     4 Glasses of wine per week      Comment: 4 drinks a week   • Drug use: No   • Sexual activity: No     Other Topics Concern   • Not  on file     Social History Narrative    ** Merged History Encounter **        Pt has 21 steps to her apt.        Objective:      Tests and Measures:  4/24/17 xray of right hip neg for fx  7/27/17 x-ray showed only mild lucency int the medial aspect of the talar dome that could be related to an osteochondral lesion  8/7/17 EKG - left ventricular hypertrophy, sinus rhythm  8/7/17 venous study neg for DVT  8/7/17 CT findings - edema at the R ankle joint, dermal thickening, no fluid collection  8/21/17 MRI neg for abscess or bone abnormality  Pulses: triphasic, audible w/Doppler  EVELYNE: PT, DP 1.5 bilaterally    Fall Risk Assessment (lynn all that apply with an X):(+) fall risk 9/8/17   65 years or older     xFall within the last 2 years, uses   Ambulatory devices  Loss of protective sensation in feet,   Use of prostethic/orthotic, years    Presence of lower extremity/foot/toe amputation   xTaking medication that increases risk (per facility policy)    Interventions Recommended (if any of the above are selected):   Use of Assistive Device:________________________   Supervision with ambulation:  Caregiver   Assistance with ambulation:  Caregiver   xHome safety education:  Educational material provided    Orthotic, protective, supportive devices: none     Wound Characteristics                                                    Location:  No open wounds Initial Evaluation  Date:9/8/17 9/18/17   skin Erythema gaiter distribution Erythema is lighter   tissue Fibrosclerosis bilateral gaiter distribution R>L  10x10 patch of thick dermis right medial leg Right medial leg sclerosis slightly softer                       S&S of Infection:   none    Edema: B LE lymphedema    Sensation:                 Measurements:no girth arnold taken this visit 9/12/17  R       L 9/18/17             calf 50.5   51.8 49.2   50.0   ankle 27.1   28.0 26.7   26.3   foot 22.0   23.0 21.0   22.8      Procedures: 9/18/17 Multilayer compression bilateral  legs, manual therapy 1 unit  coflex 2 layer applied to bilateral legs  Garments - discussed again the need for long term management with bilateral knee high stockings.  Will also discuss capris  MLD to bilateral leg ankle -->knees and STM to right medial leg fibrosclerotic area     Patient Education:  9/12/17 keep bandages in place - do not get wet in the shower - remove if painful - what to expect with consistent compression therapy.  Discussed comprilan bandaging that pt can change daily at home.   9/8/17 Pt  instructed in wound care POC,  effective LE edema control strategies, incl diet, ther ex as follows: seated ankle P/D, LAQ w/ ankle P/D, hip flex; standing calf raises, marching. Instr pt to perform 30 reps 3x/day. Written instructions provided.  Instructed in compression garment precautions,to remove compression stockings during the night, and to check frequently throughout the day for folds and wrinkles.  Pt verbalized understanding.    Professional Collaboration: Initial assessment sent to [facility] provider,  LEORA Khan via [facility]. Consulted w/M LEORA Keys re lymphedema referral, arterial studies.      Assessment:       Wound etiology: lymphedema/lipidema B LE    Wound Progress:  Girth reduced    Rationale for Treatment:referral to lymphedema care    Patient tolerance/compliance: Pt verbalized understanding of initial instructions and education; consented to POC    Complicating factors:lymphedema, EVELYNE>1 indicating necessity for arterial study prior to initiating compression    Need for ongoing Advanced Wound Care services:Pt requires referral for lymphedema assessment and treatment      Plan:      Treatment Plan and Recommendations:    Diagnosis/ICD10: I89.0    Procedures/CPT:evaluation    Frequency: 2x/week    Treatment Goals:   1. Reduce girth 2 cm at ankles  2. Brawny areas soften  3. No erythema  4. I with HEP  5. I with self bandaging  Lymphedema evaluation    At the time of each visit a  thorough assessment of the patient is completed to assure the  appropriateness of our plan of care.  The dressings or modalities may need to be adapted   from the original plan to address any significant changes in the wound environment.      Clinician Signature:_________________Date___________      Physician Signature:______________________________Date:__________________

## 2017-09-20 ENCOUNTER — NON-PROVIDER VISIT (OUTPATIENT)
Dept: WOUND CARE | Facility: MEDICAL CENTER | Age: 52
End: 2017-09-20
Attending: INTERNAL MEDICINE
Payer: COMMERCIAL

## 2017-09-20 PROCEDURE — 29581 APPL MULTLAYER CMPRN SYS LEG: CPT

## 2017-09-20 NOTE — WOUND TEAM
Advanced [facility]  [facility]  [address]  [address]  [place] [address]  [phone] Fax: [phone]      Encounter  For Certification Period:9/8/17-10/8/17        Referring Physician: Micky Rubin MD  Primary Physician:      same  Consulting Physicians:     LEORA Khan    Wound(s):B LE Lymphedema; no open wounds  Start of Care: 9/8/17       Subjective:        HPI:       Pt was referred to the wound clinic for cellulitis of the right LE.     Pt was seen by her physician on 8/24/17 with bilateral leg swelling and redness on the right.  Pt was started on Keflex and bactrim.  US was negative for DVT.  Notes state that leg swellng is a recurrent problem.   Pt has a hx of R TKR.  Pt had a fall 7/10/17 with right ankle injury.  Pt also fell 4/14 mopping resulting in hip pain.  Pt tripped on 2/15/17 injuring her left knee.  Pt states edema has become worse in 4/2017.  Pt received PT due to falls in 6/2017  Pain:        R hip since fall 4/2017; tender to palpation medial R lower leg just prox to ankle    Past Medical History:  Past Medical History:   Diagnosis Date   • Arthritis     right knee   • Dental disorder     partial upper   • Pain     left knee and arthritis pain       Current Medications:  Current Outpatient Prescriptions:   •  oxycodone-acetaminophen (PERCOCET-10)  MG Tab, Take 1-2 Tabs by mouth every four hours as needed for Severe Pain., Disp: , Rfl:   •  morphine ER (MS CONTIN) 15 MG Tab CR tablet, Take 15 mg by mouth every 12 hours., Disp: , Rfl:   •  phentermine 37.5 MG capsule, Take 1 Cap by mouth every morning., Disp: 90 Cap, Rfl: 1  •  furosemide (LASIX) 20 MG Tab, Take 1 Tab by mouth every day., Disp: 90 Tab, Rfl: 4  •  potassium chloride ER (KLOR-CON) 10 MEQ tablet, Take 1 Tab by mouth every day., Disp: 90 Tab, Rfl: 4  •  cephALEXin (KEFLEX) 500 MG Cap, Take 1 Cap by mouth 4 times a day., Disp: 40 Cap, Rfl: 0  •  olopatadine (PATANOL) 0.1 % ophthalmic  solution, Place 1 Drop in both eyes 2 times a day., Disp: 5 mL, Rfl: 11  •  oxycodone immediate-release (ROXICODONE) 5 MG Tab, Take 1 Tab by mouth every 8 hours as needed for Severe Pain., Disp: 90 Tab, Rfl: 0  •  cyanocobalamin (VITAMIN B12) 1000 MCG Tab, Take 1 Tab by mouth every day., Disp: 90 Tab, Rfl: 4  •  Lidocaine HCl 3 % Cream, Apply cream to affected area 2 times daily as needed, Disp: 1 Tube, Rfl: 0  •  meloxicam (MOBIC) 15 MG tablet, 1 tab po qday with food for djd, Disp: 90 Tab, Rfl: 1  •  Omega-3 Fatty Acids (FISH OIL) 1000 MG Cap capsule, Take 3,000 mg by mouth every day., Disp: , Rfl:   •  multivitamin (THERAGRAN) Tab, Take 1 Tab by mouth every day., Disp: , Rfl:   •  vitamin D (CHOLECALCIFEROL) 1000 UNIT Tab, Take 1,000 Units by mouth every day., Disp: , Rfl:     Allergies: Review of patient's allergies indicates no known allergies.    Past Surgical History:   Past Surgical History:   Procedure Laterality Date   • KNEE ARTHROPLASTY TOTAL Right 10/20/2015    Procedure: KNEE ARTHROPLASTY TOTAL;  Surgeon: Bryon Levine M.D.;  Location: [facility];  Service:    • APPENDECTOMY LAPAROSCOPIC  3/21/2013    Performed by Dali Queen M.D. at [facility]   • DEBRIDEMENT  5/17/2012    Performed by BRADLEY DYKES at [facility]   • PLASTIC SURGERY  2004    excess skin on arms and legs removed   • PB ENLARGE BREAST WITH IMPLANT  2004   • GASTRIC BYPASS LAPAROSCOPIC  2002    [place]   • ABDOMINAL EXPLORATION         Social History:    Social History     Social History   • Marital status: Single     Spouse name: N/A   • Number of children: N/A   • Years of education: N/A     Occupational History   • Not on file.     Social History Main Topics   • Smoking status: Never Smoker   • Smokeless tobacco: Never Used   • Alcohol use 2.0 oz/week     4 Glasses of wine per week      Comment: 4 drinks a week   • Drug use: No   • Sexual activity: No     Other Topics Concern   • Not  on file     Social History Narrative    ** Merged History Encounter **        Pt has 21 steps to her apt.        Objective:      Tests and Measures:  4/24/17 xray of right hip neg for fx  7/27/17 x-ray showed only mild lucency int the medial aspect of the talar dome that could be related to an osteochondral lesion  8/7/17 EKG - left ventricular hypertrophy, sinus rhythm  8/7/17 venous study neg for DVT  8/7/17 CT findings - edema at the R ankle joint, dermal thickening, no fluid collection  8/21/17 MRI neg for abscess or bone abnormality  Pulses: triphasic, audible w/Doppler  EVELYNE: PT, DP 1.5 bilaterally    Fall Risk Assessment (lynn all that apply with an X):(+) fall risk 9/8/17   65 years or older     xFall within the last 2 years, uses   Ambulatory devices  Loss of protective sensation in feet,   Use of prostethic/orthotic, years    Presence of lower extremity/foot/toe amputation   xTaking medication that increases risk (per facility policy)    Interventions Recommended (if any of the above are selected):   Use of Assistive Device:________________________   Supervision with ambulation:  Caregiver   Assistance with ambulation:  Caregiver   xHome safety education:  Educational material provided    Orthotic, protective, supportive devices: none     Wound Characteristics                                                    Location:  No open wounds Initial Evaluation  Date:9/8/17 9/18/17   skin Erythema gaiter distribution Erythema is lighter   tissue Fibrosclerosis bilateral gaiter distribution R>L  10x10 patch of thick dermis right medial leg Right medial leg sclerosis slightly softer                       S&S of Infection:   none    Edema: B LE lymphedema    Sensation:                 Measurements:no girth arnold taken this visit 9/12/17  R       L 9/18/17 9/20/17               calf 50.5   51.8 49.2   50.0 50.0   48.7   ankle 27.1   28.0 26.7   26.3 25.0   25.5   foot 22.0   23.0 21.0   22.8 21.0    21.5       Procedures: 9/20/17 Multilayer compression bilateral legs, coflex 2 layer applied to bilateral legs  Garments - discussed again the need for long term management with bilateral knee high stockings.  Will also discuss capris       Patient Education:  9/12/17 keep bandages in place - do not get wet in the shower - remove if painful - what to expect with consistent compression therapy.  Discussed comprilan bandaging that pt can change daily at home.   9/8/17 Pt  instructed in wound care POC,  effective LE edema control strategies, incl diet, ther ex as follows: seated ankle P/D, LAQ w/ ankle P/D, hip flex; standing calf raises, marching. Instr pt to perform 30 reps 3x/day. Written instructions provided.  Instructed in compression garment precautions,to remove compression stockings during the night, and to check frequently throughout the day for folds and wrinkles.  Pt verbalized understanding.    Professional Collaboration: Initial assessment sent to [facility] provider,  LEORA Khan via [employer]. Consulted w/M LEORA Keys re lymphedema referral, arterial studies.      Assessment:       Wound etiology: lymphedema/lipidema B LE    Wound Progress:  Girth reduced    Rationale for Treatment:referral to lymphedema care    Patient tolerance/compliance: Pt verbalized understanding of initial instructions and education; consented to POC    Complicating factors:lymphedema, EVELYNE>1 indicating necessity for arterial study prior to initiating compression    Need for ongoing Advanced Wound Care services:Pt requires referral for lymphedema assessment and treatment      Plan:      Treatment Plan and Recommendations:    Diagnosis/ICD10: I89.0    Procedures/CPT:evaluation    Frequency: 2x/week    Treatment Goals:   1. Reduce girth 2 cm at ankles  2. Brawny areas soften  3. No erythema  4. I with HEP  5. I with self bandaging  Lymphedema evaluation    At the time of each visit a thorough assessment of the patient is completed to assure  the  appropriateness of our plan of care.  The dressings or modalities may need to be adapted   from the original plan to address any significant changes in the wound environment.      Clinician Signature:_________________Date___________      Physician Signature:______________________________Date:__________________

## 2017-09-26 ENCOUNTER — NON-PROVIDER VISIT (OUTPATIENT)
Dept: WOUND CARE | Facility: MEDICAL CENTER | Age: 52
End: 2017-09-26
Attending: INTERNAL MEDICINE
Payer: COMMERCIAL

## 2017-09-26 PROCEDURE — 29581 APPL MULTLAYER CMPRN SYS LEG: CPT

## 2017-09-26 NOTE — WOUND TEAM
Advanced [facility]  [facility]  [address]  [address]  [place] [address]  [phone] Fax: [phone]      Encounter  For Certification Period:9/8/17-10/8/17        Referring Physician: Micky Rubin MD  Primary Physician:      same  Consulting Physicians:     LEORA Khan    Wound(s):B LE Lymphedema; no open wounds  Start of Care: 9/8/17       Subjective:        HPI:       Pt was referred to the wound clinic for cellulitis of the right LE.     Pt was seen by her physician on 8/24/17 with bilateral leg swelling and redness on the right.  Pt was started on Keflex and bactrim.  US was negative for DVT.  Notes state that leg swellng is a recurrent problem.   Pt has a hx of R TKR.  Pt had a fall 7/10/17 with right ankle injury.  Pt also fell 4/14 mopping resulting in hip pain.  Pt tripped on 2/15/17 injuring her left knee.  Pt states edema has become worse in 4/2017.  Pt received PT due to falls in 6/2017  Pain:        R hip since fall 4/2017; tender to palpation medial R lower leg just prox to ankle    Past Medical History:  Past Medical History:   Diagnosis Date   • Arthritis     right knee   • Dental disorder     partial upper   • Pain     left knee and arthritis pain       Current Medications:  Current Outpatient Prescriptions:   •  oxycodone-acetaminophen (PERCOCET-10)  MG Tab, Take 1-2 Tabs by mouth every four hours as needed for Severe Pain., Disp: , Rfl:   •  morphine ER (MS CONTIN) 15 MG Tab CR tablet, Take 15 mg by mouth every 12 hours., Disp: , Rfl:   •  phentermine 37.5 MG capsule, Take 1 Cap by mouth every morning., Disp: 90 Cap, Rfl: 1  •  furosemide (LASIX) 20 MG Tab, Take 1 Tab by mouth every day., Disp: 90 Tab, Rfl: 4  •  potassium chloride ER (KLOR-CON) 10 MEQ tablet, Take 1 Tab by mouth every day., Disp: 90 Tab, Rfl: 4  •  cephALEXin (KEFLEX) 500 MG Cap, Take 1 Cap by mouth 4 times a day., Disp: 40 Cap, Rfl: 0  •  olopatadine (PATANOL) 0.1 % ophthalmic  solution, Place 1 Drop in both eyes 2 times a day., Disp: 5 mL, Rfl: 11  •  oxycodone immediate-release (ROXICODONE) 5 MG Tab, Take 1 Tab by mouth every 8 hours as needed for Severe Pain., Disp: 90 Tab, Rfl: 0  •  cyanocobalamin (VITAMIN B12) 1000 MCG Tab, Take 1 Tab by mouth every day., Disp: 90 Tab, Rfl: 4  •  Lidocaine HCl 3 % Cream, Apply cream to affected area 2 times daily as needed, Disp: 1 Tube, Rfl: 0  •  meloxicam (MOBIC) 15 MG tablet, 1 tab po qday with food for djd, Disp: 90 Tab, Rfl: 1  •  Omega-3 Fatty Acids (FISH OIL) 1000 MG Cap capsule, Take 3,000 mg by mouth every day., Disp: , Rfl:   •  multivitamin (THERAGRAN) Tab, Take 1 Tab by mouth every day., Disp: , Rfl:   •  vitamin D (CHOLECALCIFEROL) 1000 UNIT Tab, Take 1,000 Units by mouth every day., Disp: , Rfl:     Allergies: Review of patient's allergies indicates no known allergies.    Past Surgical History:   Past Surgical History:   Procedure Laterality Date   • KNEE ARTHROPLASTY TOTAL Right 10/20/2015    Procedure: KNEE ARTHROPLASTY TOTAL;  Surgeon: Bryon Levine M.D.;  Location: [facility];  Service:    • APPENDECTOMY LAPAROSCOPIC  3/21/2013    Performed by Dali Queen M.D. at [facility]   • DEBRIDEMENT  5/17/2012    Performed by BRADLEY DYKES at [facility]   • PLASTIC SURGERY  2004    excess skin on arms and legs removed   • PB ENLARGE BREAST WITH IMPLANT  2004   • GASTRIC BYPASS LAPAROSCOPIC  2002    [place]   • ABDOMINAL EXPLORATION         Social History:    Social History     Social History   • Marital status: Single     Spouse name: N/A   • Number of children: N/A   • Years of education: N/A     Occupational History   • Not on file.     Social History Main Topics   • Smoking status: Never Smoker   • Smokeless tobacco: Never Used   • Alcohol use 2.0 oz/week     4 Glasses of wine per week      Comment: 4 drinks a week   • Drug use: No   • Sexual activity: No     Other Topics Concern   • Not  on file     Social History Narrative    ** Merged History Encounter **        Pt has 21 steps to her apt.        Objective:      Tests and Measures:  4/24/17 xray of right hip neg for fx  7/27/17 x-ray showed only mild lucency int the medial aspect of the talar dome that could be related to an osteochondral lesion  8/7/17 EKG - left ventricular hypertrophy, sinus rhythm  8/7/17 venous study neg for DVT  8/7/17 CT findings - edema at the R ankle joint, dermal thickening, no fluid collection  8/21/17 MRI neg for abscess or bone abnormality  Pulses: triphasic, audible w/Doppler  EVELYNE: PT, DP 1.5 bilaterally    Fall Risk Assessment (lynn all that apply with an X):(+) fall risk 9/8/17   65 years or older     xFall within the last 2 years, uses   Ambulatory devices  Loss of protective sensation in feet,   Use of prostethic/orthotic, years    Presence of lower extremity/foot/toe amputation   xTaking medication that increases risk (per facility policy)    Interventions Recommended (if any of the above are selected):   Use of Assistive Device:________________________   Supervision with ambulation:  Caregiver   Assistance with ambulation:  Caregiver   xHome safety education:  Educational material provided    Orthotic, protective, supportive devices: none     Wound Characteristics                                                    Location:  No open wounds Initial Evaluation  Date:9/8/17 9/18/17   skin Erythema gaiter distribution Erythema is lighter   tissue Fibrosclerosis bilateral gaiter distribution R>L  10x10 patch of thick dermis right medial leg Right medial leg sclerosis slightly softer                       S&S of Infection:   none    Edema: B LE lymphedema    Sensation:                 Measurements:no girth arnold taken this visit 9/12/17  R       L 9/18/17 9/20/17 9/                 calf 50.5   51.8 49.2   50.0 50.0   48.7 50.7   52.0   ankle 27.1   28.0 26.7   26.3 25.0   25.5 26.4   26.8   foot 22.0   23.0 21.0    22.8 21.0    21.5 21.3    22.1      Procedures: 9/26/17 Multilayer compression bilateral legs, coflex 2 layer applied to bilateral legs  Garments - discussed again the need for long term management with bilateral knee high stockings.  Will also discuss capris       Patient Education:  9/12/17 keep bandages in place - do not get wet in the shower - remove if painful - what to expect with consistent compression therapy.  Discussed comprilan bandaging that pt can change daily at home.   9/8/17 Pt  instructed in wound care POC,  effective LE edema control strategies, incl diet, ther ex as follows: seated ankle P/D, LAQ w/ ankle P/D, hip flex; standing calf raises, marching. Instr pt to perform 30 reps 3x/day. Written instructions provided.  Instructed in compression garment precautions,to remove compression stockings during the night, and to check frequently throughout the day for folds and wrinkles.  Pt verbalized understanding.    Professional Collaboration: Initial assessment sent to [facility] provider,  LEORA Khan via EPIC. Consulted w/M LEORA Keys re lymphedema referral, arterial studies.      Assessment:       Wound etiology: lymphedema/lipidema B LE    Wound Progress:  Girth reduced    Rationale for Treatment:referral to lymphedema care    Patient tolerance/compliance: Pt verbalized understanding of initial instructions and education; consented to POC    Complicating factors:lymphedema, EVELYNE>1 indicating necessity for arterial study prior to initiating compression    Need for ongoing Advanced Wound Care services:Pt requires referral for lymphedema assessment and treatment      Plan:      Treatment Plan and Recommendations:    Diagnosis/ICD10: I89.0    Procedures/CPT:evaluation    Frequency: 2x/week    Treatment Goals:   1. Reduce girth 2 cm at ankles  2. Brawny areas soften  3. No erythema  4. I with HEP  5. I with self bandaging  Lymphedema evaluation    At the time of each visit a thorough assessment of  the patient is completed to assure the  appropriateness of our plan of care.  The dressings or modalities may need to be adapted   from the original plan to address any significant changes in the wound environment.      Clinician Signature:_________________Date___________      Physician Signature:______________________________Date:__________________

## 2017-10-02 ENCOUNTER — PATIENT MESSAGE (OUTPATIENT)
Dept: MEDICAL GROUP | Age: 52
End: 2017-10-02

## 2017-10-02 DIAGNOSIS — I87.2 VENOUS INSUFFICIENCY: ICD-10-CM

## 2017-10-02 DIAGNOSIS — I89.0 LYMPHEDEMA: ICD-10-CM

## 2017-10-02 DIAGNOSIS — I73.9 PERIPHERAL ARTERIAL DISEASE (HCC): ICD-10-CM

## 2017-10-03 PROBLEM — I87.2 VENOUS INSUFFICIENCY: Status: ACTIVE | Noted: 2017-10-03

## 2017-10-03 NOTE — TELEPHONE ENCOUNTER
Karine Ovalle  [address] [facility]   Phone Number: [phone]             Dr. Rubin,     Is the referral to be seen by Dr. Micky Gill/Vascular Specialist still valid?   My current Wound Care Therapist at [facility], stated that it is possible that some of my veins in the right leg   are narrow and the blood flow is not going though in a manner as it should.   But otherwise, the Would Care Therapy is going good.       Thank you,   Dr. Micky Rubin.       Karine Ovalle

## 2017-10-03 NOTE — TELEPHONE ENCOUNTER
Referral to the wound clinic and the vascular medicine done. The previous vascular medicine referral was in August, so it should be good through November but I'm not sure, so I reordered it.

## 2017-10-11 ENCOUNTER — TELEPHONE (OUTPATIENT)
Dept: MEDICAL GROUP | Age: 52
End: 2017-10-11

## 2017-10-11 NOTE — TELEPHONE ENCOUNTER
ESTABLISHED PATIENT PRE-VISIT PLANNING     Note: Patient will not be contacted if there is no indication to call.     1.  Reviewed notes from the last few office visits within the medical group: Yes    2.  If any orders were placed at last visit or intended to be done for this visit (i.e. 6 mos follow-up), do we have Results/Consult Notes?        •  Labs - Labs ordered, NOT completed. Patient advised to complete prior to next appointment.   Note: If patient appointment is for lab review and patient did not complete labs,                check with provider if OK to reschedule patient until labs completed.       •  Imaging - Imaging ordered, NOT completed. Patient advised to complete prior to next appointment.       •  Referrals - Referral ordered, patient has NOT been seen.    3. Is this appointment scheduled as a Hospital Follow-Up? No    4.  Immunizations were updated in Epic using WebIZ?: No WebIZ record       •  Web Iz Recommendations:     5.  Patient is due for the following Health Maintenance Topics:   Health Maintenance Due   Topic Date Due   • IMM PNEUMOCOCCAL 19-64 (ADULT) MEDIUM RISK SERIES (1 of 1 - PPSV23) 05/17/1984   • COLON CANCER SCREENING ANNUAL FIT  05/17/2015   • PAP SMEAR  07/09/2016   • IMM INFLUENZA (1) 09/01/2017           6.  Patient was informed to arrive 15 min prior to their scheduled appointment and bring in their medication bottles.

## 2017-10-12 ENCOUNTER — OFFICE VISIT (OUTPATIENT)
Dept: MEDICAL GROUP | Age: 52
End: 2017-10-12
Payer: COMMERCIAL

## 2017-10-12 VITALS
HEIGHT: 62 IN | OXYGEN SATURATION: 96 % | TEMPERATURE: 97.5 F | HEART RATE: 98 BPM | SYSTOLIC BLOOD PRESSURE: 124 MMHG | WEIGHT: 227 LBS | BODY MASS INDEX: 41.77 KG/M2 | DIASTOLIC BLOOD PRESSURE: 80 MMHG

## 2017-10-12 DIAGNOSIS — R53.81 DEBILITY: ICD-10-CM

## 2017-10-12 DIAGNOSIS — Z12.11 COLON CANCER SCREENING: ICD-10-CM

## 2017-10-12 DIAGNOSIS — L03.119 CELLULITIS AND ABSCESS OF LEG: ICD-10-CM

## 2017-10-12 DIAGNOSIS — Z12.11 SCREEN FOR COLON CANCER: ICD-10-CM

## 2017-10-12 DIAGNOSIS — L02.419 CELLULITIS AND ABSCESS OF LEG: ICD-10-CM

## 2017-10-12 DIAGNOSIS — Z79.891 CHRONIC USE OF OPIATE FOR THERAPEUTIC PURPOSE: ICD-10-CM

## 2017-10-12 DIAGNOSIS — E66.01 SEVERE OBESITY (BMI >= 40) (HCC): ICD-10-CM

## 2017-10-12 PROCEDURE — 99215 OFFICE O/P EST HI 40 MIN: CPT | Performed by: INTERNAL MEDICINE

## 2017-10-12 ASSESSMENT — ENCOUNTER SYMPTOMS
RESPIRATORY NEGATIVE: 1
CONSTITUTIONAL NEGATIVE: 1
PSYCHIATRIC NEGATIVE: 1
GASTROINTESTINAL NEGATIVE: 1
NEUROLOGICAL NEGATIVE: 1
EYES NEGATIVE: 1
MUSCULOSKELETAL NEGATIVE: 1
CARDIOVASCULAR NEGATIVE: 1

## 2017-10-13 NOTE — PROGRESS NOTES
Subjective:      Karine Ovalle is a 52 y.o. female who presents with Other (needs paperwork filled out )    Since the patient's last visit she has return to work but still has to leave work once or twice a week for her medical appointments for her chronic lymphedema and musculoskeletal disorders. Paperwork was filled out when last seen for FMLA. She is here for follow-up of this to see there is any further patient she needs to have done to this. She did not bring any paperwork for FMLA extensions which we had filled out extending her until April 2018. If she needs further revisions she'll have her employer send us the necessary paperwork.    In addition she would like standing R for her landlord indicating her disability and accommodations that need to be made on her behalf. This will be filled out within the next week. She provided a sample A which we will make amendments to honor N.            and     The patient is here for followup of chronic medical problems listed below. The patient is compliant with medications and having no side effects from them. Denies chest pain, abdominal pain, dyspnea, myalgias, or cough.     No Known Allergies  Outpatient Medications Prior to Visit   Medication Sig Dispense Refill   • oxycodone-acetaminophen (PERCOCET-10)  MG Tab Take 1-2 Tabs by mouth every four hours as needed for Severe Pain.     • morphine ER (MS CONTIN) 15 MG Tab CR tablet Take 15 mg by mouth every 12 hours.     • cephALEXin (KEFLEX) 500 MG Cap Take 1 Cap by mouth 4 times a day. 40 Cap 0   • olopatadine (PATANOL) 0.1 % ophthalmic solution Place 1 Drop in both eyes 2 times a day. 5 mL 11   • cyanocobalamin (VITAMIN B12) 1000 MCG Tab Take 1 Tab by mouth every day. 90 Tab 4   • Lidocaine HCl 3 % Cream Apply cream to affected area 2 times daily as needed 1 Tube 0   • meloxicam (MOBIC) 15 MG tablet 1 tab po qday with food for djd 90 Tab 1   • Omega-3 Fatty Acids (FISH OIL) 1000 MG Cap capsule Take 3,000 mg  "by mouth every day.     • multivitamin (THERAGRAN) Tab Take 1 Tab by mouth every day.     • vitamin D (CHOLECALCIFEROL) 1000 UNIT Tab Take 1,000 Units by mouth every day.     • phentermine 37.5 MG capsule Take 1 Cap by mouth every morning. 90 Cap 1   • furosemide (LASIX) 20 MG Tab Take 1 Tab by mouth every day. 90 Tab 4   • potassium chloride ER (KLOR-CON) 10 MEQ tablet Take 1 Tab by mouth every day. 90 Tab 4   • oxycodone immediate-release (ROXICODONE) 5 MG Tab Take 1 Tab by mouth every 8 hours as needed for Severe Pain. 90 Tab 0     No facility-administered medications prior to visit.                HPI    Review of Systems   Constitutional: Negative.    HENT: Negative.    Eyes: Negative.    Respiratory: Negative.    Cardiovascular: Negative.    Gastrointestinal: Negative.    Genitourinary: Negative.    Musculoskeletal: Negative.    Skin: Negative.    Neurological: Negative.    Endo/Heme/Allergies: Negative.    Psychiatric/Behavioral: Negative.           Objective:     /80   Pulse 98   Temp 36.4 °C (97.5 °F)   Ht 1.575 m (5' 2.01\")   Wt 103 kg (227 lb)   LMP 05/17/2017   SpO2 96%   BMI 41.51 kg/m²      Physical Exam   Constitutional: She is oriented to person, place, and time. She appears well-developed and well-nourished. No distress.   HENT:   Head: Normocephalic and atraumatic.   Right Ear: External ear normal.   Left Ear: External ear normal.   Nose: Nose normal.   Mouth/Throat: Oropharynx is clear and moist. No oropharyngeal exudate.   Eyes: Conjunctivae and EOM are normal. Pupils are equal, round, and reactive to light. Right eye exhibits no discharge. Left eye exhibits no discharge. No scleral icterus.   Neck: Normal range of motion. Neck supple. No JVD present. No tracheal deviation present. No thyromegaly present.   Cardiovascular: Normal rate, regular rhythm, normal heart sounds and intact distal pulses.  Exam reveals no gallop and no friction rub.    No murmur heard.  Pulmonary/Chest: " Effort normal and breath sounds normal. No stridor. No respiratory distress. She has no wheezes. She has no rales. She exhibits no tenderness.   Abdominal: Soft. Bowel sounds are normal. She exhibits no distension and no mass. There is no tenderness. There is no rebound and no guarding.   Musculoskeletal: Normal range of motion. She exhibits no edema or tenderness.   Lymphadenopathy:     She has no cervical adenopathy.   Neurological: She is alert and oriented to person, place, and time. She has normal reflexes. She displays normal reflexes. No cranial nerve deficit. She exhibits normal muscle tone. Coordination normal.   Skin: Skin is warm and dry. No rash noted. She is not diaphoretic. No erythema. No pallor.   Psychiatric: She has a normal mood and affect. Her behavior is normal. Judgment and thought content normal.   Vitals reviewed.    No visits with results within 1 Month(s) from this visit.   Latest known visit with results is:   Hospital Outpatient Visit on 08/09/2017   Component Date Value   • Urine Amphetamine-Metham* 08/10/2017 Positive    • Barbiturates 08/10/2017 Negative    • Benzodiazepines 08/10/2017 Negative    • Buprenorphine, Urn, Scrn 08/10/2017 Negative    • Carisoprodol, Urn, Scrn 08/10/2017 Negative    • Cocaine Metabolite 08/10/2017 Negative    • Ethyl Glucuronide Scrn, * 08/10/2017 Negative    • Fentanyl, Urn, Scrn 08/10/2017 Negative    • Meperidine, Urn, Scrn 08/10/2017 Negative    • Methadone 08/10/2017 Negative    • Opiates, Urn, Scrn 08/10/2017 Positive    • Oxycodone/Oxymorphone, U* 08/10/2017 Positive    • Phencyclidine -Pcp 08/10/2017 Negative    • Propoxyphene 08/10/2017 Negative    • Tapentadol, Urn, Scrn 08/10/2017 Negative    • Cannabinoid Metab 08/10/2017 Negative    • Tramadol, Urn, Scrn 08/10/2017 Negative    • Zolpidem, Urn, Scrn 08/10/2017 Negative    • Scrn, Urine Interpretati* 08/10/2017 See Note    • Opiates, Ur, Hydrocodone 08/12/2017 <20    • Opiates, Ur, Hydromorpho*  08/12/2017 <20    • Opiates, Ur, Codeine 08/12/2017 <20    • Opiates, Ur, Morphine 08/12/2017 <20    • Opiates, Ur, 6_AM 08/12/2017 <10    • Opiates, Ur, Noroxycodone 08/12/2017 >4000    • Opiates, Ur, Noroxymorph* 08/12/2017 223    • Opiates, Ur, Oxycodone 08/12/2017 2179    • Opiates, Ur, Oxymorphone 08/12/2017 46    • Opiates, Ur, Norhydrocod* 08/12/2017 <20    • Amphetamine, Urine 08/14/2017 <200    • Methamphetamine, Urine 08/14/2017 <200    • Methylenedioxyamphetamin* 08/14/2017 <200    • Methylenedioxymethamphet* 08/14/2017 <200    • Methylenedioxyethylamphe* 08/14/2017 <200       Lab Results   Component Value Date/Time    HBA1C 5.3 06/12/2017 09:03 AM     Lab Results   Component Value Date/Time    SODIUM 134 (L) 06/12/2017 09:02 AM    POTASSIUM 3.9 06/12/2017 09:02 AM    CHLORIDE 102 06/12/2017 09:02 AM    CO2 26 06/12/2017 09:02 AM    GLUCOSE 96 06/12/2017 09:02 AM    BUN 11 06/12/2017 09:02 AM    CREATININE 0.75 06/12/2017 09:02 AM    CREATININE 0.88 08/14/2010 12:00 AM    BUNCREATRAT 13 08/14/2010 12:00 AM    GLOMRATE >59 08/14/2010 12:00 AM    ALKPHOSPHAT 100 (H) 06/12/2017 09:02 AM    ASTSGOT 18 06/12/2017 09:02 AM    ALTSGPT 10 06/12/2017 09:02 AM    TBILIRUBIN 0.7 06/12/2017 09:02 AM     Lab Results   Component Value Date/Time    INR 1.04 05/18/2012 12:20 PM     Lab Results   Component Value Date/Time    CHOLSTRLTOT 144 06/12/2017 09:02 AM    LDL 63 06/12/2017 09:02 AM    HDL 69 06/12/2017 09:02 AM    TRIGLYCERIDE 60 06/12/2017 09:02 AM       No results found for: TESTOSTERONE  Lab Results   Component Value Date/Time    TSH 2.110 08/14/2010 12:00 AM     Lab Results   Component Value Date/Time    FREET4 0.72 05/03/2016 07:32 AM     Lab Results   Component Value Date/Time    URICACID 5.6 08/12/2014 04:49 PM     No components found for: VITB12  Lab Results   Component Value Date/Time    25HYDROXY 33 08/12/2014 04:49 PM    25HYDROXY 25 (L) 07/03/2012 09:20 AM                 Assessment/Plan:     1.  Cellulitis and abscess of leg- right lower inner- wound clinic      Under good control. Continue same regimen.  2. Screen for colon cancer     - REFERRAL TO GI FOR COLONOSCOPY    3. Severe obesity (BMI >= 40) (CMS-[facility])    diet/exercise/lose 15 lbs.; patient counseled      4. Chronic use of opiate for therapeutic purpose- [place] adv pain dr sanders following      Under good control. Continue same regimen.    5. Colon cancer screening- needs     - REFERRAL TO GI FOR COLONOSCOPY  - OCCULT BLOOD FECES IMMUNOASSAY (FIT); Future    6. Debility       Secondary to lymphedema and musculoskeletal disorders or lower extremities.     40 minute face-to-face encounter took place today.  More than half of this time was spent in the coordination of care of the above problems, as well as counseling.

## 2017-10-17 ENCOUNTER — HOSPITAL ENCOUNTER (OUTPATIENT)
Dept: RADIOLOGY | Facility: MEDICAL CENTER | Age: 52
End: 2017-10-17
Attending: PAIN MEDICINE
Payer: COMMERCIAL

## 2017-10-17 ENCOUNTER — TELEPHONE (OUTPATIENT)
Dept: MEDICAL GROUP | Age: 52
End: 2017-10-17

## 2017-10-17 DIAGNOSIS — I89.0 LYMPHEDEMA: ICD-10-CM

## 2017-10-17 DIAGNOSIS — L02.419 CELLULITIS AND ABSCESS OF LEG: ICD-10-CM

## 2017-10-17 DIAGNOSIS — I87.2 VENOUS INSUFFICIENCY: ICD-10-CM

## 2017-10-17 DIAGNOSIS — M47.816 LUMBAR SPONDYLOSIS: ICD-10-CM

## 2017-10-17 DIAGNOSIS — L03.119 CELLULITIS AND ABSCESS OF LEG: ICD-10-CM

## 2017-10-17 PROCEDURE — 72100 X-RAY EXAM L-S SPINE 2/3 VWS: CPT

## 2017-10-17 NOTE — TELEPHONE ENCOUNTER
Need to see the denial letter so I can understand better why the insurance co wants her to see a cardiologist. A ref has already been made to vascular med on 10/3/17, so this should suffice.      . This was reordered on an urgent basis today since her appt with vasc med is not until 12/12/17

## 2017-10-17 NOTE — TELEPHONE ENCOUNTER
----- Message from Didi Zamarripa sent at 10/17/2017  3:28 PM PDT -----  Regarding: Wound Care Denial  Good Afternoon,    The patient was referred to [facility], but [facility] has unfortunately denied the referral.  Instead they would like a referral placed for Cardiovascular evaluation.  Please let me know when this has been done.    Thank you,  Hellen MARTINO [facility]

## 2017-10-18 ENCOUNTER — HOSPITAL ENCOUNTER (OUTPATIENT)
Dept: PAIN MANAGEMENT | Facility: REHABILITATION | Age: 52
End: 2017-10-18
Attending: PAIN MEDICINE
Payer: COMMERCIAL

## 2017-10-18 RX ORDER — MIDAZOLAM HYDROCHLORIDE 1 MG/ML
INJECTION INTRAMUSCULAR; INTRAVENOUS
Status: COMPLETED
Start: 2017-10-18 | End: 2017-10-18

## 2017-10-18 RX ORDER — BUPIVACAINE HYDROCHLORIDE 2.5 MG/ML
INJECTION, SOLUTION EPIDURAL; INFILTRATION; INTRACAUDAL
Status: COMPLETED
Start: 2017-10-18 | End: 2017-10-18

## 2017-10-18 RX ORDER — TRIAMCINOLONE ACETONIDE 40 MG/ML
INJECTION, SUSPENSION INTRA-ARTICULAR; INTRAMUSCULAR
Status: COMPLETED
Start: 2017-10-18 | End: 2017-10-18

## 2017-10-18 RX ORDER — LIDOCAINE HYDROCHLORIDE 10 MG/ML
INJECTION, SOLUTION EPIDURAL; INFILTRATION; INTRACAUDAL; PERINEURAL
Status: COMPLETED
Start: 2017-10-18 | End: 2017-10-18

## 2017-10-18 NOTE — OP REPORT
Bipolar Radiofrequency Ablation of Lumbar and Sacroiliac Joint Nerves.  Surgeon: Gosia Chung M.D.  ASSISTANT:  None  Site:  [facility]   Duration of Anesthesia:    PREOPERATIVE DIAGNOSIS:  Lumbosacral spondylosis with Sacroiliitis.    POSTOPERATIVE DIAGNOSIS:  Lumbosacral spondylosis with Sacroiliitis.    PROCEDURE PERFORMED:  1. Bipolar Radiofrequency ablation of the L4 medial branch nerve on the Right side.  2. Additional levels, L5 dorsal rami and lateral branches of the S1, S2 and S3 on the Right side.  3. Fluoroscopy for precise needle placement.    ANESTHESIA:  Local infiltration with monitored anesthesia care conscious sedation.    MONITORS:   Automatic blood pressure cuff and pulse oximetry.    INDICATIONS:  2 consecutive successful blocks with 80% improvement of pain after the blocks.  REVIEW OF SYSTEMS:  Negative for fever, chills, chest pain, SOB, bleeding abnormalities, nausea, vomiting, diarrhea, worsening edema, or new rashes.    FOCUSED PHYSICAL EXAMINATION:  The patient is awake, alert and oriented, and is in no acute distress.  Vital signs are stable.  The patient is afebrile.  The rest of the PE is essentially unchanged from the patient's recent visit to our office.    We explained the procedure to the patient including the risks, benefits and alternatives to the procedure.  The patient verbalized understanding and was willing to proceed.    PROCEDURE IN DETAIL:  An informed consent was obtained.  The patient was taken to the procedure room and was positively identified by the staff and the attending physician.  The patient was positioned prone on the procedure bed.  Vital signs were monitored as above and remained stable throughout the procedure.  The skin was prepped and draped in the standard sterile fashion.  A surgical pause (time-out) was performed and was agreed upon by the members of the team. A fluoroscopic view of the lumbar spine was obtained, and the area of interest was identified.   The skin was anesthetized using 1% lidocaine and 25-gauge 1-1/2-inch needle.    After that, two 16-gauge 10-cm RF cannulas with 10-mm active tip was advanced on to juncture L5 superior articular process and L5 transverse process on the right. Full contact with the bone was established. Additional needles were placed at the juncture of S1 SAP and Sacral ala on the right and lateral to right S1, S2, and S3 foramen by using epsilon.  A 25 G 3.5 inch spinal needle was placed into right S1 neuroforamen used to identify the S1 neuroforamen and was used as a marker for the depth of foramen. (Each level was stimulated for sensory fibers at 50 Hz, and the patient's pain was reproduced at approximately 1.2 V.)  (The motor fiber recruitment was ruled out by stimulation at 2 Hz in the range between 1.5 to 2.5 V.)  Prior to lesioning, each nerve was anesthetized with 0.5 mL of Lidocaine 1%. For L4 medial branch and L5 dorsal rami one lesion per level was performed and after that at each level, we performed three lesions at 90 degrees centigrade, 105 seconds in duration at 5:30, 4:00 and 2:30 o'clock.   Before removing the radiofrequency cannulae, each site was injected with 1 ml of the mixture containing 40 mg Kenalog and 4 cc of bupivacaine 0.25%.    All needles were withdrawn.  The patient tolerated the procedure well.  The patient was transferred in stable condition to the recovery room.    COMPLICATIONS:  None.    PAIN SCORE BEFORE THE PROCEDURE:     PAIN SCORE AFTER THE PROCEDURE:      DISPOSITON:  1. Discharge to home when the discharge criteria are met.  2. Follow up in two weeks in the Pain Clinic.  3. (The patient will resume their medications.

## 2017-10-19 ENCOUNTER — OFFICE VISIT (OUTPATIENT)
Dept: URGENT CARE | Facility: CLINIC | Age: 52
End: 2017-10-19
Payer: COMMERCIAL

## 2017-10-19 VITALS
HEIGHT: 62 IN | DIASTOLIC BLOOD PRESSURE: 84 MMHG | RESPIRATION RATE: 16 BRPM | OXYGEN SATURATION: 95 % | HEART RATE: 102 BPM | TEMPERATURE: 98.2 F | WEIGHT: 222 LBS | BODY MASS INDEX: 40.85 KG/M2 | SYSTOLIC BLOOD PRESSURE: 120 MMHG

## 2017-10-19 DIAGNOSIS — M54.31 SCIATICA OF RIGHT SIDE: ICD-10-CM

## 2017-10-19 PROCEDURE — 99214 OFFICE O/P EST MOD 30 MIN: CPT | Performed by: NURSE PRACTITIONER

## 2017-10-19 RX ORDER — CYCLOBENZAPRINE HCL 5 MG
5-10 TABLET ORAL
Qty: 20 TAB | Refills: 0 | Status: SHIPPED | OUTPATIENT
Start: 2017-10-19 | End: 2017-11-13

## 2017-10-19 NOTE — PROGRESS NOTES
"Subjective:      Karine Ovalle is a 52 y.o. female who presents with Back Pain (x 2 months, lower back pain and spasms after fall)            HPI New problem. 52 year old female with spasms in right side of back for one day. She states pain is moderate to severe and radiating down her right leg. She has extensive history of chronic pain and takes percocet and morphine daily for her symptoms. She states that her medication this morning did not help much with her spasm. She does see pain management but could not get in today. She has no bowel or bladder issues, she does have extensive edema of her legs which is not new.  Allergies, medications and history reviewed by me today  Using ice for her symptoms.      ROS  Please see HPI       Objective:     /84   Pulse (!) 102   Temp 36.8 °C (98.2 °F)   Resp 16   Ht 1.575 m (5' 2\")   Wt 100.7 kg (222 lb)   LMP 05/17/2017   SpO2 95%   BMI 40.60 kg/m²      Physical Exam   Constitutional: She appears well-developed and well-nourished. No distress.   Cardiovascular: Normal rate, regular rhythm and normal heart sounds.    No murmur heard.  Pulmonary/Chest: Effort normal and breath sounds normal.   Musculoskeletal: She exhibits edema.        Lumbar back: She exhibits decreased range of motion, tenderness, pain and spasm. She exhibits no swelling.   Limited ROM due to pain in lower right side of her back.   Neurological: She is alert. No sensory deficit. She exhibits normal muscle tone. Gait normal.   Nursing note and vitals reviewed.              Assessment/Plan:     1. Sciatica of right side  cyclobenzaprine (FLEXERIL) 5 MG tablet     Likely more sciatica.  Cannot give toradol due to her being on mobic.  Flexeril 5 mg at bed time for her pain and follow up with PCP or pain management.  She is cautioned on sedation potential of flexeril on this visit.    "

## 2017-10-19 NOTE — LETTER
October 19, 2017        Karine Fox Vasquez  [address]  [place] [address]        Karine was seen in our clinic today and she is excused from work today and cleared to return. Thank you.  If you have any questions or concerns, please don't hesitate to call.        Sincerely,        ЮЛИЯ Leal.    Electronically Signed

## 2017-10-23 ENCOUNTER — PATIENT MESSAGE (OUTPATIENT)
Dept: MEDICAL GROUP | Age: 52
End: 2017-10-23

## 2017-10-25 ENCOUNTER — PATIENT MESSAGE (OUTPATIENT)
Dept: MEDICAL GROUP | Age: 52
End: 2017-10-25

## 2017-11-01 ENCOUNTER — PATIENT MESSAGE (OUTPATIENT)
Dept: MEDICAL GROUP | Age: 52
End: 2017-11-01

## 2017-11-08 ENCOUNTER — OFFICE VISIT (OUTPATIENT)
Dept: MEDICAL GROUP | Age: 52
End: 2017-11-08
Payer: COMMERCIAL

## 2017-11-08 VITALS
TEMPERATURE: 98.2 F | SYSTOLIC BLOOD PRESSURE: 120 MMHG | BODY MASS INDEX: 42.33 KG/M2 | OXYGEN SATURATION: 96 % | HEIGHT: 62 IN | DIASTOLIC BLOOD PRESSURE: 70 MMHG | HEART RATE: 99 BPM | WEIGHT: 230 LBS

## 2017-11-08 DIAGNOSIS — L03.119 CELLULITIS AND ABSCESS OF LEG: ICD-10-CM

## 2017-11-08 DIAGNOSIS — M54.41 CHRONIC BILATERAL LOW BACK PAIN WITH BILATERAL SCIATICA: ICD-10-CM

## 2017-11-08 DIAGNOSIS — L02.419 CELLULITIS AND ABSCESS OF LEG: ICD-10-CM

## 2017-11-08 DIAGNOSIS — E66.01 SEVERE OBESITY (BMI >= 40) (HCC): ICD-10-CM

## 2017-11-08 DIAGNOSIS — Z12.11 COLON CANCER SCREENING: ICD-10-CM

## 2017-11-08 DIAGNOSIS — G89.29 CHRONIC BILATERAL LOW BACK PAIN WITH BILATERAL SCIATICA: ICD-10-CM

## 2017-11-08 DIAGNOSIS — G89.4 CHRONIC PAIN SYNDROME: ICD-10-CM

## 2017-11-08 DIAGNOSIS — M54.42 CHRONIC BILATERAL LOW BACK PAIN WITH BILATERAL SCIATICA: ICD-10-CM

## 2017-11-08 DIAGNOSIS — I89.0 LYMPHEDEMA: ICD-10-CM

## 2017-11-08 DIAGNOSIS — Z02.9 ADMINISTRATIVE ENCOUNTER: ICD-10-CM

## 2017-11-08 PROCEDURE — 99215 OFFICE O/P EST HI 40 MIN: CPT | Performed by: INTERNAL MEDICINE

## 2017-11-08 ASSESSMENT — ENCOUNTER SYMPTOMS
CONSTITUTIONAL NEGATIVE: 1
CARDIOVASCULAR NEGATIVE: 1
RESPIRATORY NEGATIVE: 1
PSYCHIATRIC NEGATIVE: 1
MUSCULOSKELETAL NEGATIVE: 1
GASTROINTESTINAL NEGATIVE: 1
NEUROLOGICAL NEGATIVE: 1
EYES NEGATIVE: 1

## 2017-11-13 ENCOUNTER — OFFICE VISIT (OUTPATIENT)
Dept: URGENT CARE | Facility: CLINIC | Age: 52
End: 2017-11-13
Payer: COMMERCIAL

## 2017-11-13 VITALS
RESPIRATION RATE: 20 BRPM | TEMPERATURE: 98 F | HEART RATE: 100 BPM | WEIGHT: 240 LBS | DIASTOLIC BLOOD PRESSURE: 80 MMHG | OXYGEN SATURATION: 96 % | HEIGHT: 62 IN | SYSTOLIC BLOOD PRESSURE: 120 MMHG | BODY MASS INDEX: 44.16 KG/M2

## 2017-11-13 DIAGNOSIS — L08.9 SKIN INFECTION: ICD-10-CM

## 2017-11-13 DIAGNOSIS — M54.9 DORSALGIA: ICD-10-CM

## 2017-11-13 DIAGNOSIS — M79.89 LEG SWELLING: ICD-10-CM

## 2017-11-13 PROCEDURE — 99214 OFFICE O/P EST MOD 30 MIN: CPT | Performed by: FAMILY MEDICINE

## 2017-11-13 RX ORDER — CEPHALEXIN 500 MG/1
500 CAPSULE ORAL 4 TIMES DAILY
Qty: 28 CAP | Refills: 0 | Status: SHIPPED | OUTPATIENT
Start: 2017-11-13 | End: 2017-11-20

## 2017-11-13 RX ORDER — CYCLOBENZAPRINE HCL 5 MG
5-10 TABLET ORAL 3 TIMES DAILY PRN
Qty: 30 TAB | Refills: 0 | Status: SHIPPED | OUTPATIENT
Start: 2017-11-13 | End: 2017-11-30 | Stop reason: SDUPTHER

## 2017-11-13 ASSESSMENT — ENCOUNTER SYMPTOMS
SHORTNESS OF BREATH: 0
CHILLS: 0
HEMOPTYSIS: 0
DIZZINESS: 0
FEVER: 0
ORTHOPNEA: 0
FOCAL WEAKNESS: 0

## 2017-11-13 NOTE — PROGRESS NOTES
Subjective:      Karine Ovalle is a 52 y.o. female who presents with Spasms (Couple lower back pain radiated to right leg) and Edema (today ankle swollen )    Chief Complaint   Patient presents with   • Spasms     Couple lower back pain radiated to right leg   • Edema     today ankle swollen         - This is a very pleasant 52 y.o. female with complaints of ongoing low back pain radiating down Rt thigh for months. Is seeing pain specialists. Says flexeril helps but she is out of them.     - also complains of increased swelling pain redness pain Rt medial lower leg x 2-3 days. No posterior calf pain. No cp/sob           ALLERGIES:  Review of patient's allergies indicates no known allergies.     PMH:  Past Medical History:   Diagnosis Date   • Arthritis     right knee   • Dental disorder     partial upper   • Pain     left knee and arthritis pain        MEDS:    Current Outpatient Prescriptions:   •  cephALEXin (KEFLEX) 500 MG Cap, Take 1 Cap by mouth 4 times a day for 7 days., Disp: 28 Cap, Rfl: 0  •  cyclobenzaprine (FLEXERIL) 5 MG tablet, Take 1-2 Tabs by mouth 3 times a day as needed., Disp: 30 Tab, Rfl: 0  •  oxycodone-acetaminophen (PERCOCET-10)  MG Tab, Take 1-2 Tabs by mouth every four hours as needed for Severe Pain., Disp: , Rfl:   •  morphine ER (MS CONTIN) 15 MG Tab CR tablet, Take 15 mg by mouth every 12 hours., Disp: , Rfl:   •  phentermine 37.5 MG capsule, Take 1 Cap by mouth every morning., Disp: 90 Cap, Rfl: 1  •  furosemide (LASIX) 20 MG Tab, Take 1 Tab by mouth every day., Disp: 90 Tab, Rfl: 4  •  potassium chloride ER (KLOR-CON) 10 MEQ tablet, Take 1 Tab by mouth every day., Disp: 90 Tab, Rfl: 4  •  olopatadine (PATANOL) 0.1 % ophthalmic solution, Place 1 Drop in both eyes 2 times a day., Disp: 5 mL, Rfl: 11  •  oxycodone immediate-release (ROXICODONE) 5 MG Tab, Take 1 Tab by mouth every 8 hours as needed for Severe Pain., Disp: 90 Tab, Rfl: 0  •  cyanocobalamin (VITAMIN B12) 1000 MCG  "Tab, Take 1 Tab by mouth every day., Disp: 90 Tab, Rfl: 4  •  Lidocaine HCl 3 % Cream, Apply cream to affected area 2 times daily as needed, Disp: 1 Tube, Rfl: 0  •  meloxicam (MOBIC) 15 MG tablet, 1 tab po qday with food for djd, Disp: 90 Tab, Rfl: 1  •  Omega-3 Fatty Acids (FISH OIL) 1000 MG Cap capsule, Take 3,000 mg by mouth every day., Disp: , Rfl:   •  multivitamin (THERAGRAN) Tab, Take 1 Tab by mouth every day., Disp: , Rfl:   •  vitamin D (CHOLECALCIFEROL) 1000 UNIT Tab, Take 1,000 Units by mouth every day., Disp: , Rfl:     ** I have documented what I find to be significant in regards to past medical, social, family and surgical history  in my HPI or under PMH/PSH/FH review section, otherwise it is contributory **           HPI    Review of Systems   Constitutional: Negative for chills and fever.   Respiratory: Negative for hemoptysis and shortness of breath.    Cardiovascular: Negative for chest pain and orthopnea.   Musculoskeletal: Positive for joint pain.   Neurological: Negative for dizziness and focal weakness.          Objective:     /80   Pulse 100   Temp 36.7 °C (98 °F)   Resp 20   Ht 1.575 m (5' 2\")   Wt 108.9 kg (240 lb)   LMP 05/17/2017   SpO2 96%   BMI 43.90 kg/m²      Physical Exam   Constitutional: She appears well-developed. No distress.   HENT:   Head: Normocephalic and atraumatic.   Mouth/Throat: Oropharynx is clear and moist.   Eyes: Conjunctivae are normal.   Neck: Neck supple.   Cardiovascular: Regular rhythm.    No murmur heard.  Pulmonary/Chest: Effort normal. No respiratory distress.   Neurological: She is alert. She exhibits normal muscle tone.   Skin: Skin is warm and dry.   Psychiatric: She has a normal mood and affect. Judgment normal.   Nursing note and vitals reviewed.  Bilateral lower extremity strength and sensory intact.  Negative straight leg raise.   Can toe and heel walk  Some pain to palp/rom     RLE: some woody edema erythema warmth medial lower aspect     "        Assessment/Plan:         1. Dorsalgia  cyclobenzaprine (FLEXERIL) 5 MG tablet   2. Skin infection  cephALEXin (KEFLEX) 500 MG Cap   3. Leg swelling  US-EXTREMITY VENOUS UNILATERAL-LOWER RIGHT       *refused stat US DVT r/o.       Dx & d/c instructions discussed w/ patient and/or family members. Follow up w/ Prvt Dr or here in 2 days,  ER if worse and UC/PCP unavailable.        Possible side effects (i.e. Rash, GI upset/constipation, sedation, elevation of BP or sugars) of any medications given discussed.

## 2017-11-13 NOTE — LETTER
November 13, 2017         Patient: Karine Ovalle   YOB: 1965   Date of Visit: 11/13/2017           To Whom it May Concern:    Karine Ovalle was seen in my clinic on 11/13/2017. She may return to work today.    If you have any questions or concerns, please don't hesitate to call.        Sincerely,           Zander Bower M.D.  Electronically Signed

## 2017-11-15 ENCOUNTER — TELEPHONE (OUTPATIENT)
Dept: MEDICAL GROUP | Age: 52
End: 2017-11-15

## 2017-11-15 DIAGNOSIS — M25.511 RIGHT SHOULDER PAIN, UNSPECIFIED CHRONICITY: ICD-10-CM

## 2017-11-15 NOTE — TELEPHONE ENCOUNTER
[facility]  LEORA Duvall from Dr Kaur's office  Call back number [phone]    Pt was having inter nerving scale block done on R shoulder and they found collected fluid so they are requesting a referral to a specialist that can evaluate this and would like a [facility] call to know where she will be referred.

## 2017-11-20 ENCOUNTER — OFFICE VISIT (OUTPATIENT)
Dept: URGENT CARE | Facility: CLINIC | Age: 52
End: 2017-11-20
Payer: COMMERCIAL

## 2017-11-20 VITALS
RESPIRATION RATE: 16 BRPM | TEMPERATURE: 97.5 F | HEIGHT: 62 IN | WEIGHT: 240 LBS | HEART RATE: 103 BPM | BODY MASS INDEX: 44.16 KG/M2 | SYSTOLIC BLOOD PRESSURE: 122 MMHG | DIASTOLIC BLOOD PRESSURE: 80 MMHG | OXYGEN SATURATION: 100 %

## 2017-11-20 DIAGNOSIS — I87.303 STASIS EDEMA OF BOTH LOWER EXTREMITIES: ICD-10-CM

## 2017-11-20 PROCEDURE — 99213 OFFICE O/P EST LOW 20 MIN: CPT | Performed by: NURSE PRACTITIONER

## 2017-11-20 NOTE — TELEPHONE ENCOUNTER
Referral signed. Cierra, for similar requests in the future, please key up referral order and send the note to providers to sign.    Nakia Goldsmith M.D.

## 2017-11-20 NOTE — LETTER
November 20, 2017       Patient: Karine Ovalle   YOB: 1965   Date of Visit: 11/20/2017         To Whom It May Concern:    It is my medical opinion that Karine Ovalle may return to work on 11/21/17.    If you have any questions or concerns, please don't hesitate to call [phone]          Sincerely,          Cathey J Hamman, A.P.N.  Electronically Signed

## 2017-11-23 ASSESSMENT — ENCOUNTER SYMPTOMS
FEVER: 0
COUGH: 0
ORTHOPNEA: 0
CHILLS: 0
SHORTNESS OF BREATH: 0

## 2017-11-23 NOTE — PROGRESS NOTES
Subjective:      Karine Ovalle is a 52 y.o. female who presents with Ankle Pain (right ankle, left knee, fell this morning in her kitchen, swollen and red)     Past Medical History:   Diagnosis Date   • Arthritis     right knee   • Dental disorder     partial upper   • Pain     left knee and arthritis pain     Social History     Social History   • Marital status: Single     Spouse name: N/A   • Number of children: N/A   • Years of education: N/A     Occupational History   • Not on file.     Social History Main Topics   • Smoking status: Never Smoker   • Smokeless tobacco: Never Used   • Alcohol use 2.0 oz/week     4 Glasses of wine per week      Comment: 4 drinks a week   • Drug use: No   • Sexual activity: No     Other Topics Concern   • Not on file     Social History Narrative    ** Merged History Encounter **          Family History   Problem Relation Age of Onset   • Diabetes Mother    • Heart Disease Mother        Allergies: Patient has no known allergies.    Patient is a 52-year-old female who presents today with complaint of swelling to both lower extremities. This has been a chronic and ongoing problem. She was seen here in urgent care one week ago for the same problem. His treatment that time, the patient had an ultrasound ordered as well as a prescription for Keflex due to the concern for cellulitis. Patient states that she did not fill the Keflex and she has not been the ultrasound. She as of yet does not have that scheduled. Patient complains that she continues to have swelling in her legs. Patient does state that the previous urgent care physician who saw her advised her that she may have to go to the hospital for diuresis if her symptoms persisted.          Other   This is a chronic problem. The problem occurs constantly. The problem has been unchanged. Pertinent negatives include no chest pain, chills, coughing or fever. Associated symptoms comments: Bilateral leg swelling. Nothing aggravates  "the symptoms. She has tried nothing for the symptoms. The treatment provided no relief.       Review of Systems   Constitutional: Negative for chills, fever and malaise/fatigue.   Respiratory: Negative for cough and shortness of breath.    Cardiovascular: Positive for leg swelling. Negative for chest pain and orthopnea.   All other systems reviewed and are negative.         Objective:     /80   Pulse (!) 103   Temp 36.4 °C (97.5 °F)   Resp 16   Ht 1.575 m (5' 2\")   Wt 108.9 kg (240 lb)   LMP 05/17/2017   SpO2 100%   BMI 43.90 kg/m²      Physical Exam   Constitutional: She is oriented to person, place, and time. She appears well-developed and well-nourished. No distress.   Neck: Normal range of motion. Neck supple.   Cardiovascular: Normal rate and regular rhythm.    Pulmonary/Chest: Effort normal and breath sounds normal. No respiratory distress. She has no wheezes. She has no rales.   3+ edema to both lower extremities to the knee bilaterally.    Neurological: She is alert and oriented to person, place, and time.   Skin: Skin is warm and dry. She is not diaphoretic.   Psychiatric: She has a normal mood and affect. Her behavior is normal. Judgment and thought content normal.   Vitals reviewed.    I advised the patient that at this point in the day, but do not have stat labs available or imaging. I did advise the patient that I think she should get the ultrasound done; however as her symptoms have been intractable at this point I do think hospital follow-up would be most appropriate. The patient verbalized understanding and agrees that she believes the same, [facility] She appears to be concerned about the time factor involved and says that she does have to return to work. I did encourage patient strongly to go to ER. As she states that she will not go tonight, I advised patient to double her Lasix and potassium over the next 2-3 days and keep her legs elevated as much as " possible. I reiterated that if her symptoms are intractable that I do strongly advised her to follow up in emergency. Patient verbalized understanding and agreement.            Assessment/Plan:   Lower extremity edema, untreated   -Strongly recommended ER evaluation at this time; patient refused   -increase lasix to 40 mg daily over the next 2-3 days; also increase potassium to 20 meq daily over the next 2-3 days   -elevate   -follow up with PCP   -ER precautions for persistent or worsening ofs ymptoms.   There are no diagnoses linked to this encounter.

## 2017-11-29 ENCOUNTER — HOSPITAL ENCOUNTER (OUTPATIENT)
Dept: PAIN MANAGEMENT | Facility: REHABILITATION | Age: 52
End: 2017-11-29
Attending: PAIN MEDICINE
Payer: COMMERCIAL

## 2017-11-29 ENCOUNTER — HOSPITAL ENCOUNTER (OUTPATIENT)
Dept: RADIOLOGY | Facility: REHABILITATION | Age: 52
End: 2017-11-29
Attending: PAIN MEDICINE
Payer: COMMERCIAL

## 2017-11-29 VITALS
SYSTOLIC BLOOD PRESSURE: 123 MMHG | BODY MASS INDEX: 41.68 KG/M2 | HEIGHT: 63 IN | TEMPERATURE: 97.7 F | HEART RATE: 99 BPM | DIASTOLIC BLOOD PRESSURE: 74 MMHG | WEIGHT: 235.23 LBS | OXYGEN SATURATION: 98 % | RESPIRATION RATE: 17 BRPM

## 2017-11-29 PROCEDURE — 64635 DESTROY LUMB/SAC FACET JNT: CPT

## 2017-11-29 PROCEDURE — 64636 DESTROY L/S FACET JNT ADDL: CPT

## 2017-11-29 PROCEDURE — 99153 MOD SED SAME PHYS/QHP EA: CPT

## 2017-11-29 PROCEDURE — 99152 MOD SED SAME PHYS/QHP 5/>YRS: CPT

## 2017-11-29 PROCEDURE — 700111 HCHG RX REV CODE 636 W/ 250 OVERRIDE (IP)

## 2017-11-29 PROCEDURE — 64640 INJECTION TREATMENT OF NERVE: CPT

## 2017-11-29 RX ORDER — MIDAZOLAM HYDROCHLORIDE 1 MG/ML
INJECTION INTRAMUSCULAR; INTRAVENOUS
Status: COMPLETED
Start: 2017-11-29 | End: 2017-11-29

## 2017-11-29 RX ORDER — BUPIVACAINE HYDROCHLORIDE 2.5 MG/ML
INJECTION, SOLUTION EPIDURAL; INFILTRATION; INTRACAUDAL
Status: COMPLETED
Start: 2017-11-29 | End: 2017-11-29

## 2017-11-29 RX ORDER — TRIAMCINOLONE ACETONIDE 40 MG/ML
INJECTION, SUSPENSION INTRA-ARTICULAR; INTRAMUSCULAR
Status: COMPLETED
Start: 2017-11-29 | End: 2017-11-29

## 2017-11-29 RX ORDER — LIDOCAINE HYDROCHLORIDE 10 MG/ML
INJECTION, SOLUTION EPIDURAL; INFILTRATION; INTRACAUDAL; PERINEURAL
Status: COMPLETED
Start: 2017-11-29 | End: 2017-11-29

## 2017-11-29 RX ADMIN — MIDAZOLAM HYDROCHLORIDE 2 MG: 1 INJECTION, SOLUTION INTRAMUSCULAR; INTRAVENOUS at 14:17

## 2017-11-29 RX ADMIN — FENTANYL CITRATE 50 MCG: 50 INJECTION, SOLUTION INTRAMUSCULAR; INTRAVENOUS at 14:23

## 2017-11-29 RX ADMIN — BUPIVACAINE HYDROCHLORIDE 6 ML: 2.5 INJECTION, SOLUTION EPIDURAL; INFILTRATION; INTRACAUDAL; PERINEURAL at 14:32

## 2017-11-29 RX ADMIN — MIDAZOLAM HYDROCHLORIDE 0.5 MG: 1 INJECTION, SOLUTION INTRAMUSCULAR; INTRAVENOUS at 14:34

## 2017-11-29 RX ADMIN — FENTANYL CITRATE 50 MCG: 50 INJECTION, SOLUTION INTRAMUSCULAR; INTRAVENOUS at 14:28

## 2017-11-29 RX ADMIN — TRIAMCINOLONE ACETONIDE 40 MG: 40 INJECTION, SUSPENSION INTRA-ARTICULAR; INTRAMUSCULAR at 14:29

## 2017-11-29 RX ADMIN — LIDOCAINE HYDROCHLORIDE 30 ML: 10 INJECTION, SOLUTION EPIDURAL; INFILTRATION; INTRACAUDAL; PERINEURAL at 14:29

## 2017-11-29 ASSESSMENT — PAIN SCALES - GENERAL
PAINLEVEL_OUTOF10: 5
PAINLEVEL_OUTOF10: 6
PAINLEVEL_OUTOF10: 8

## 2017-11-29 NOTE — OP REPORT
Bipolar Radiofrequency Ablation of Lumbar and Sacroiliac Joint Nerves.  Surgeon: Gosia Chung M.D.  ASSISTANT:  None  Site: [facility]   Duration of Anesthesia: 28 minutes     PREOPERATIVE DIAGNOSIS:  Lumbosacral spondylosis with Sacroiliitis.    POSTOPERATIVE DIAGNOSIS:  Lumbosacral spondylosis with Sacroiliitis.    PROCEDURE PERFORMED:  1. Bipolar Radiofrequency ablation of the L4 medial branch nerve on the Right side.  2. Additional levels, L5 dorsal rami and lateral branches of the S1, S2 and S3 on the Right side.  3. Fluoroscopy for precise needle placement.    ANESTHESIA:  Local infiltration with monitored anesthesia care conscious sedation.    MONITORS:   Automatic blood pressure cuff and pulse oximetry.    INDICATIONS:  2 consecutive successful blocks with 80% improvement of pain after the blocks.  REVIEW OF SYSTEMS:  Negative for fever, chills, chest pain, SOB, bleeding abnormalities, nausea, vomiting, diarrhea, worsening edema, or new rashes.    FOCUSED PHYSICAL EXAMINATION:  The patient is awake, alert and oriented, and is in no acute distress.  Vital signs are stable.  The patient is afebrile.  The rest of the PE is essentially unchanged from the patient's recent visit to our office.    We explained the procedure to the patient including the risks, benefits and alternatives to the procedure.  The patient verbalized understanding and was willing to proceed.    PROCEDURE IN DETAIL:  An informed consent was obtained.  The patient was taken to the procedure room and was positively identified by the staff and the attending physician.  The patient was positioned prone on the procedure bed.  Vital signs were monitored as above and remained stable throughout the procedure.  The skin was prepped and draped in the standard sterile fashion.  A surgical pause (time-out) was performed and was agreed upon by the members of the team. A fluoroscopic view of the lumbar spine was obtained, and the area of interest was  identified.  The skin was anesthetized using 1% lidocaine and 25-gauge 1-1/2-inch needle.    After that, one 16-gauge 10-cm RF cannulas with 10-mm active tip was advanced on to juncture L5 superior articular process and L5 transverse process on the right. Full contact with the bone was established. Additional needles, 2 needles at each level were placed at the juncture of S1 SAP and Sacral ala on the right and lateral to right S1, S2, and S3 foramen by using epsilon. (Each level was stimulated for sensory fibers at 50 Hz, and the patient's pain was reproduced at approximately 1.2 V.)  (The motor fiber recruitment was ruled out by stimulation at 2 Hz in the range between 1.5 to 2.5 V.)  Prior to lesioning, each nerve was anesthetized with 0.5 mL of Lidocaine 1%. For L4 medial branch and L5 dorsal rami one lesion per level was performed and after that at each level, we performed three lesions at 90 degrees centigrade, 105 seconds in duration at 5:30, 4:00 and 2:30 o'clock.   Before removing the radiofrequency cannulae, each site was injected with 1 ml of the mixture containing 40 mg Kenalog and 4 cc of bupivacaine 0.25%.    All needles were withdrawn.  The patient tolerated the procedure well.  The patient was transferred in stable condition to the recovery room.    COMPLICATIONS:  None.    PAIN SCORE BEFORE THE PROCEDURE:     PAIN SCORE AFTER THE PROCEDURE:      DISPOSITON:  1. Discharge to home when the discharge criteria are met.  2. Follow up in two weeks in the Pain Clinic.  3. (The patient will resume their medications

## 2017-11-29 NOTE — PROGRESS NOTES
Handoff received from PATO Keys RN.Patient ambulatory, awake and verbally responsive. Cold compress applied to the affected area. Taking fluids well.

## 2017-11-29 NOTE — PROGRESS NOTES
Medication reconciliation reviewed with patient. Denied taking any blood thinners . She had Mobic today at 0700 AM.Patient had a  Contreras/ boyfriend. Patient had Morphine, Percocet at 0700 AM and Flexeril and Percocet at 1230 PM today.Patient had both lower extremities swollen since last April.Patient said that she  supposed to have an antibiotic before the procedure ( prohylaxis ) Dr. Chung made aware and he told me , no need of giving her of antibiotic . Hand off reported to PATO Keys RN.

## 2017-11-29 NOTE — PROGRESS NOTES
Grounding pad removed leaving intact skin.Hand off report to Kimberlyn Tapia RN.  Pt tolerated procedure and able to ambulate back.

## 2017-11-29 NOTE — PROGRESS NOTES
Hand off report recv'd from Kimberlyn Tapia RN, Dr Chung made aware of pts percocet and morphine dosing today.

## 2017-11-30 ENCOUNTER — OFFICE VISIT (OUTPATIENT)
Dept: URGENT CARE | Facility: CLINIC | Age: 52
End: 2017-11-30
Payer: COMMERCIAL

## 2017-11-30 VITALS
WEIGHT: 235 LBS | DIASTOLIC BLOOD PRESSURE: 88 MMHG | SYSTOLIC BLOOD PRESSURE: 140 MMHG | TEMPERATURE: 98.6 F | RESPIRATION RATE: 20 BRPM | HEIGHT: 63 IN | BODY MASS INDEX: 41.64 KG/M2 | HEART RATE: 100 BPM | OXYGEN SATURATION: 96 %

## 2017-11-30 DIAGNOSIS — Z96.651 HISTORY OF TOTAL KNEE ARTHROPLASTY, RIGHT: ICD-10-CM

## 2017-11-30 DIAGNOSIS — R60.0 EDEMA OF BOTH LEGS: Primary | ICD-10-CM

## 2017-11-30 DIAGNOSIS — M54.9 DORSALGIA: ICD-10-CM

## 2017-11-30 PROCEDURE — 99214 OFFICE O/P EST MOD 30 MIN: CPT | Performed by: PHYSICIAN ASSISTANT

## 2017-11-30 RX ORDER — SULFAMETHOXAZOLE AND TRIMETHOPRIM 800; 160 MG/1; MG/1
1 TABLET ORAL 2 TIMES DAILY
Qty: 14 TAB | Refills: 0 | Status: SHIPPED | OUTPATIENT
Start: 2017-11-30 | End: 2017-12-07

## 2017-11-30 RX ORDER — FUROSEMIDE 20 MG/1
20 TABLET ORAL DAILY
Qty: 90 TAB | Refills: 4 | Status: SHIPPED | OUTPATIENT
Start: 2017-11-30 | End: 2018-04-26

## 2017-11-30 RX ORDER — POTASSIUM CHLORIDE 750 MG/1
10 TABLET, FILM COATED, EXTENDED RELEASE ORAL DAILY
Qty: 90 TAB | Refills: 4 | Status: SHIPPED | OUTPATIENT
Start: 2017-11-30 | End: 2018-05-08 | Stop reason: SDUPTHER

## 2017-11-30 RX ORDER — CYCLOBENZAPRINE HCL 5 MG
5-10 TABLET ORAL 3 TIMES DAILY PRN
Qty: 30 TAB | Refills: 0 | Status: SHIPPED | OUTPATIENT
Start: 2017-11-30 | End: 2017-12-11

## 2017-11-30 NOTE — LETTER
November 30, 2017        Karine Ovalle  [address]  [facility] [address]      To Whom It May Concern:    It is my medical opinion that Karine Ovalle may be excused from work for the date of 11/30/17-12-1-17.    If you have any questions or concerns, please don't hesitate to call.        Sincerely,        Dejon Valentino P.A.-C.    Electronically Signed

## 2017-11-30 NOTE — PATIENT INSTRUCTIONS
Peripheral Edema  You have swelling in your legs (peripheral edema). This swelling is due to excess accumulation of salt and water in your body. Edema may be a sign of heart, kidney or liver disease, or a side effect of a medication. It may also be due to problems in the leg veins. Elevating your legs and using special support stockings may be very helpful, if the cause of the swelling is due to poor venous circulation. Avoid long periods of standing, whatever the cause.  Treatment of edema depends on identifying the cause. Chips, pretzels, pickles and other salty foods should be avoided. Restricting salt in your diet is almost always needed. Water pills (diuretics) are often used to remove the excess salt and water from your body via urine. These medicines prevent the kidney from reabsorbing sodium. This increases urine flow.  Diuretic treatment may also result in lowering of potassium levels in your body. Potassium supplements may be needed if you have to use diuretics daily. Daily weights can help you keep track of your progress in clearing your edema. You should call your caregiver for follow up care as recommended.  SEEK IMMEDIATE MEDICAL CARE IF:   · You have increased swelling, pain, redness, or heat in your legs.  · You develop shortness of breath, especially when lying down.  · You develop chest or abdominal pain, weakness, or fainting.  · You have a fever.     This information is not intended to replace advice given to you by your health care provider. Make sure you discuss any questions you have with your health care provider.     Document Released: 01/25/2006 Document Revised: 03/11/2013 Document Reviewed: 01/05/2011  [employer] Patient Education ©2016 [employer]

## 2017-11-30 NOTE — PROGRESS NOTES
Subjective:      PT is a 52 y.o. female who presents with Fever (Fever last night)            HPI  Pt notes she recently had a pain mgt nerve ablation procedure and has hx of Right TKR and was not given abx therapy for coverage and she notes new onset fever last night, notes B/L leg edema and continued chronic dorsalgia. She sees pain mgt bu notes she is out of lasix, k-dur, and flexeril and wishes to have abx therapy. PT notes she will se Vascular surgery in clinic on 17.  Pt has not taken any Rx medications for this condition. Pt states the pain is a 4/10, aching in nature and worse at night. Pt denies CP, SOB, NVD, paresthesias, headaches, dizziness, change in vision, hives, or other joint pain. The pt's medication list, problem list, and allergies have been evaluated and reviewed during today's visit.    PMH:  Past Medical History:   Diagnosis Date   • Arthritis     right knee   • Dental disorder     partial upper   • Pain     left knee and arthritis pain       PSH:  Past Surgical History:   Procedure Laterality Date   • KNEE ARTHROPLASTY TOTAL Right 10/20/2015    Procedure: KNEE ARTHROPLASTY TOTAL;  Surgeon: Bryon Levine M.D.;  Location: SURGERY [facility];  Service:    • APPENDECTOMY LAPAROSCOPIC  3/21/2013    Performed by Dali Queen M.D. at SURGERY [facility]   • DEBRIDEMENT  2012    Performed by BRADLEY DYKES at SURGERY [facility]   • PLASTIC SURGERY      excess skin on arms and legs removed   • PB ENLARGE BREAST WITH IMPLANT     • GASTRIC BYPASS LAPAROSCOPIC      [place]   • ABDOMINAL EXPLORATION         Fam Hx:    family history includes Diabetes in her mother; Heart Disease in her mother.  Family Status   Relation Status   • Mother    • Father        Soc HX:  Social History     Social History   • Marital status: Single     Spouse name: N/A   • Number of children: N/A   • Years of education: N/A     Occupational History   • Not on file.      Social History Main Topics   • Smoking status: Never Smoker   • Smokeless tobacco: Never Used   • Alcohol use 2.0 oz/week     4 Glasses of wine per week      Comment: 4 drinks a week   • Drug use: No   • Sexual activity: No     Other Topics Concern   • Not on file     Social History Narrative    ** Merged History Encounter **              Medications:    Current Outpatient Prescriptions:   •  cyclobenzaprine (FLEXERIL) 5 MG tablet, Take 1-2 Tabs by mouth 3 times a day as needed., Disp: 30 Tab, Rfl: 0  •  furosemide (LASIX) 20 MG Tab, Take 1 Tab by mouth every day., Disp: 90 Tab, Rfl: 4  •  potassium chloride ER (KLOR-CON) 10 MEQ tablet, Take 1 Tab by mouth every day., Disp: 90 Tab, Rfl: 4  •  sulfamethoxazole-trimethoprim (BACTRIM DS) 800-160 MG tablet, Take 1 Tab by mouth 2 times a day for 7 days., Disp: 14 Tab, Rfl: 0  •  meloxicam (MOBIC) 15 MG tablet, TAKE ONE TABLET BY MOUTH ONCE DAILY WITH FOOD FOR  DJD, Disp: 90 Tab, Rfl: 0  •  oxycodone-acetaminophen (PERCOCET-10)  MG Tab, Take 1-2 Tabs by mouth every four hours as needed for Severe Pain., Disp: , Rfl:   •  morphine ER (MS CONTIN) 15 MG Tab CR tablet, Take 15 mg by mouth every 12 hours., Disp: , Rfl:   •  phentermine 37.5 MG capsule, Take 1 Cap by mouth every morning., Disp: 90 Cap, Rfl: 1  •  olopatadine (PATANOL) 0.1 % ophthalmic solution, Place 1 Drop in both eyes 2 times a day., Disp: 5 mL, Rfl: 11  •  oxycodone immediate-release (ROXICODONE) 5 MG Tab, Take 1 Tab by mouth every 8 hours as needed for Severe Pain., Disp: 90 Tab, Rfl: 0  •  cyanocobalamin (VITAMIN B12) 1000 MCG Tab, Take 1 Tab by mouth every day., Disp: 90 Tab, Rfl: 4  •  Lidocaine HCl 3 % Cream, Apply cream to affected area 2 times daily as needed, Disp: 1 Tube, Rfl: 0  •  Omega-3 Fatty Acids (FISH OIL) 1000 MG Cap capsule, Take 3,000 mg by mouth every day., Disp: , Rfl:   •  multivitamin (THERAGRAN) Tab, Take 1 Tab by mouth every day., Disp: , Rfl:   •  vitamin D  "(CHOLECALCIFEROL) 1000 UNIT Tab, Take 1,000 Units by mouth every day., Disp: , Rfl:       Allergies:  Patient has no known allergies.    ROS    Constitutional: Negative for fever, chills and malaise/fatigue.   HENT: Negative for congestion and sore throat.    Eyes: Negative for blurred vision, double vision and photophobia.   Respiratory: Negative for cough and shortness of breath.    Cardiovascular: Negative for chest pain and palpitations.   Gastrointestinal: Negative for heartburn, nausea, vomiting, abdominal pain, diarrhea and constipation.   Genitourinary: Negative for dysuria and flank pain.   Musculoskeletal: POS for B/L leg edema and swelling and joint pain and myalgias.   Skin: Negative for itching and rash.   Neurological: Negative for dizziness, tingling and headaches.   Endo/Heme/Allergies: Does not bruise/bleed easily.   Psychiatric/Behavioral: Negative for depression. The patient is not nervous/anxious.         Objective:     /88   Pulse 100   Temp 37 °C (98.6 °F)   Resp 20   Ht 1.6 m (5' 3\")   Wt 106.6 kg (235 lb)   LMP 05/17/2017   SpO2 96%   BMI 41.63 kg/m²      Physical Exam   Musculoskeletal:        Left lower leg: She exhibits tenderness, swelling and edema. She exhibits no bony tenderness, no deformity and no laceration.        Legs:        Constitutional: PT is oriented to person, place, and time. PT appears well-developed and well-nourished. No distress.   HENT:   Head: Normocephalic and atraumatic.   Mouth/Throat: Oropharynx is clear and moist. No oropharyngeal exudate.   Eyes: Conjunctivae normal and EOM are normal. Pupils are equal, round, and reactive to light.   Neck: Normal range of motion. Neck supple. No thyromegaly present.   Cardiovascular: Normal rate, regular rhythm, normal heart sounds and intact distal pulses.  Exam reveals no gallop and no friction rub.    No murmur heard.  Pulmonary/Chest: Effort normal and breath sounds normal. No respiratory distress. PT has no " wheezes. PT has no rales. Pt exhibits no tenderness.   Abdominal: Soft. Bowel sounds are normal. PT exhibits no distension and no mass. There is no tenderness. There is no rebound and no guarding.   Neurological: PT is alert and oriented to person, place, and time. PT has normal reflexes. No cranial nerve deficit.   Skin: Skin is warm and dry. No rash noted. PT is not diaphoretic. No erythema.       Psychiatric: PT has a normal mood and affect. PT behavior is normal. Judgment and thought content normal.          Assessment/Plan:     1. Edema of both legs    - furosemide (LASIX) 20 MG Tab; Take 1 Tab by mouth every day.  Dispense: 90 Tab; Refill: 4  - potassium chloride ER (KLOR-CON) 10 MEQ tablet; Take 1 Tab by mouth every day.  Dispense: 90 Tab; Refill: 4    2. Dorsalgia    - cyclobenzaprine (FLEXERIL) 5 MG tablet; Take 1-2 Tabs by mouth 3 times a day as needed.  Dispense: 30 Tab; Refill: 0    3. History of total knee arthroplasty, right    - sulfamethoxazole-trimethoprim (BACTRIM DS) 800-160 MG tablet; Take 1 Tab by mouth 2 times a day for 7 days.  Dispense: 14 Tab; Refill: 0    Pt with recent pain mgt nerve ablation procedure and hx of R TKR and wishes for abx coverage  Rest, fluids encouraged.  Note given for work.  AVS with medical info given.  Pt was in full understanding and agreement with the plan.  Follow-up as needed if symptoms worsen or fail to improve.

## 2017-12-05 ENCOUNTER — PATIENT MESSAGE (OUTPATIENT)
Dept: MEDICAL GROUP | Age: 52
End: 2017-12-05

## 2017-12-06 ENCOUNTER — OFFICE VISIT (OUTPATIENT)
Dept: URGENT CARE | Facility: CLINIC | Age: 52
End: 2017-12-06
Payer: COMMERCIAL

## 2017-12-06 VITALS
SYSTOLIC BLOOD PRESSURE: 120 MMHG | HEART RATE: 78 BPM | OXYGEN SATURATION: 96 % | DIASTOLIC BLOOD PRESSURE: 80 MMHG | RESPIRATION RATE: 20 BRPM | TEMPERATURE: 98.2 F

## 2017-12-06 DIAGNOSIS — R60.9 EDEMA, PERIPHERAL: Primary | ICD-10-CM

## 2017-12-06 PROCEDURE — 99213 OFFICE O/P EST LOW 20 MIN: CPT | Performed by: PHYSICIAN ASSISTANT

## 2017-12-06 NOTE — LETTER
December 6, 2017       Patient: Karine Ovalle   YOB: 1965   Date of Visit: 12/6/2017         To Whom It May Concern:    It is my medical opinion that Karine Ovalle was seen in my clinic today and may return to work today as well.      If you have any questions or concerns, please don't hesitate to call [phone]          Sincerely,          Dejon Valentino P.A.-C.  Electronically Signed

## 2017-12-07 ENCOUNTER — TELEPHONE (OUTPATIENT)
Dept: MEDICAL GROUP | Age: 52
End: 2017-12-07

## 2017-12-07 ENCOUNTER — PATIENT MESSAGE (OUTPATIENT)
Dept: MEDICAL GROUP | Age: 52
End: 2017-12-07

## 2017-12-07 ENCOUNTER — OFFICE VISIT (OUTPATIENT)
Dept: MEDICAL GROUP | Age: 52
End: 2017-12-07
Payer: COMMERCIAL

## 2017-12-07 VITALS
OXYGEN SATURATION: 97 % | SYSTOLIC BLOOD PRESSURE: 118 MMHG | DIASTOLIC BLOOD PRESSURE: 70 MMHG | HEIGHT: 63 IN | HEART RATE: 118 BPM | TEMPERATURE: 97.6 F | BODY MASS INDEX: 40.57 KG/M2 | WEIGHT: 229 LBS

## 2017-12-07 DIAGNOSIS — I89.0 LYMPHEDEMA: ICD-10-CM

## 2017-12-07 DIAGNOSIS — G89.29 CHRONIC BILATERAL LOW BACK PAIN WITH BILATERAL SCIATICA: ICD-10-CM

## 2017-12-07 DIAGNOSIS — Z02.9 ADMINISTRATIVE ENCOUNTER: ICD-10-CM

## 2017-12-07 DIAGNOSIS — M54.42 CHRONIC BILATERAL LOW BACK PAIN WITH BILATERAL SCIATICA: ICD-10-CM

## 2017-12-07 DIAGNOSIS — M54.41 CHRONIC BILATERAL LOW BACK PAIN WITH BILATERAL SCIATICA: ICD-10-CM

## 2017-12-07 PROCEDURE — 99214 OFFICE O/P EST MOD 30 MIN: CPT | Performed by: FAMILY MEDICINE

## 2017-12-07 NOTE — TELEPHONE ENCOUNTER
Patient may have up to a total of 8 hrs of flare ups per week, every week, for 6 months, without regard to how many times per week she has these.     Please modify FMLA as needed to show this. Let me know if this works for her, and if not, then have write down what will. Will sign amended form if necessary

## 2017-12-07 NOTE — TELEPHONE ENCOUNTER
Regarding: RE: Non-Urgent Medical Question  Contact: [phone]  ----- Message from Khalida Liu, Med Ass't sent at 12/7/2017  8:53 AM PST -----       ----- Message from Karine Ovalle to Micky Rubin M.D. sent at 12/7/2017  8:44 AM -----   Good morning Celia,    Please let me know if Dr. Goldsmith will clarify the flair-ups, as Dr. Rubin had verbally stated on my last visit.  ie...  I have up to 8 hours of flair -ups per week.  If I should miss a whole 8 hr day from work, then that is my one  flair-up for the week.  Now on the other hand, should I have 2 - 5 flair-ups per week that add up to 8/hrs. Then once again,  I have met my 8 hrs of flair-ups for the week.  My H[employer] Dept  doesn't comprehend Dr. Rubin's instructions.  They simply look at one flair-up , which could be under the 8 hrs as my One Flair-Up for the week...regardless of that weekly flair-up to be 1 min or 10 mins.  I sure hope that Emily Syed/[facility], understood it better when you or Dr. Rubin spoke to her this week.      TD  ----- Message -----  From: Celia Hernandes, Med Ass't  Sent: 12/5/2017  4:50 PM PST  To: Karine Ovalle  Subject: RE: Non-Urgent Medical Question  I understand where you are coming from, let me talk to Dr. Goldsmith to make sure that she will be able to help you with these and I will let you know.     ----- Message -----     From: Karine Ovalle     Sent: 12/5/2017  4:46 PM PST       To: Micky Rubin M.D.  Subject: RE: Non-Urgent Medical Question    Celia,  all these co-pays are breaking me...I just paid rent and in process of moving.  Any way, Do you think Dr. Goldsmith would understand the FMLA issues or do you think Dr. GOLDSMITH would sign the paperwork?  I just don't want to waste my time nor money if  Dr. Goldsmith doesn't understand.    TD.  ----- Message -----  From: Celia Hernandes, Med Ass't  Sent: 12/5/2017  4:24 PM PST  To: Karine Ovalle  Subject: RE: Non-Urgent Medical Question  Dr. Caryn Munoz is in office for  the next 3 weeks, he will be out of the office on 12/22/17 and will not return until January, he just does not have any openings available, unfortunately Dr. Goldsmith has no control over the co payment issue. Every time you see a provider it is considered an office visit and a co payment is required at time of visit.     ----- Message -----     From: Karine Ovalle     Sent: 12/5/2017  4:09 PM PST       To: Micky Rubin M.D.  Subject: Non-Urgent Medical Question      Celia,  I had been advised that Dr. Rubin is on vacation and will not see any patients until the two first week of January 2018.  I hope that Dr. Goldsmith will be able to work on my [facility] Paperwork this week, and to waive the co-pay.   After all, I would hope that the paperwork couldn't be faxed over instead.  apt on 12/17 @ 3:25pm    Subject: FMLA PAPERWORK    Appointment Scheduled    Message body:  Appointment Information:   Visit Type: Established Patient   Date: 12/7/2017   Dept: [address]   Provider: SULAIMAN GOLDSMITH   Time: 3:40 PM   Length: 20 min    Appt Status: Scheduled      Appt Instructions:   You will be receiving a confirmation call a few days before your appointment   from our automated call confirmation system.

## 2017-12-07 NOTE — PATIENT INSTRUCTIONS
Edema  Edema is an abnormal buildup of fluids in your body tissues. Edema is somewhat dependent on gravity to pull the fluid to the lowest place in your body. That makes the condition more common in the legs and thighs (lower extremities). Painless swelling of the feet and ankles is common and becomes more likely as you get older. It is also common in looser tissues, like around your eyes.   When the affected area is squeezed, the fluid may move out of that spot and leave a dent for a few moments. This dent is called pitting.   CAUSES   There are many possible causes of edema. Eating too much salt and being on your feet or sitting for a long time can cause edema in your legs and ankles. Hot weather may make edema worse. Common medical causes of edema include:  · Heart failure.  · Liver disease.  · Kidney disease.  · Weak blood vessels in your legs.  · Cancer.  · An injury.  · Pregnancy.  · Some medications.  · Obesity.   SYMPTOMS   Edema is usually painless. Your skin may look swollen or shiny.   DIAGNOSIS   Your health care provider may be able to diagnose edema by asking about your medical history and doing a physical exam. You may need to have tests such as X-rays, an electrocardiogram, or blood tests to check for medical conditions that may cause edema.   TREATMENT   Edema treatment depends on the cause. If you have heart, liver, or kidney disease, you need the treatment appropriate for these conditions. General treatment may include:  · Elevation of the affected body part above the level of your heart.  · Compression of the affected body part. Pressure from elastic bandages or support stockings squeezes the tissues and forces fluid back into the blood vessels. This keeps fluid from entering the tissues.  · Restriction of fluid and salt intake.  · Use of a water pill (diuretic). These medications are appropriate only for some types of edema. They pull fluid out of your body and make you urinate more often. This  gets rid of fluid and reduces swelling, but diuretics can have side effects. Only use diuretics as directed by your health care provider.  HOME CARE INSTRUCTIONS   · Keep the affected body part above the level of your heart when you are lying down.    · Do not sit still or stand for prolonged periods.    · Do not put anything directly under your knees when lying down.  · Do not wear constricting clothing or garters on your upper legs.    · Exercise your legs to work the fluid back into your blood vessels. This may help the swelling go down.    · Wear elastic bandages or support stockings to reduce ankle swelling as directed by your health care provider.    · Eat a low-salt diet to reduce fluid if your health care provider recommends it.    · Only take medicines as directed by your health care provider.   SEEK MEDICAL CARE IF:   · Your edema is not responding to treatment.  · You have heart, liver, or kidney disease and notice symptoms of edema.  · You have edema in your legs that does not improve after elevating them.    · You have sudden and unexplained weight gain.  SEEK IMMEDIATE MEDICAL CARE IF:   · You develop shortness of breath or chest pain.    · You cannot breathe when you lie down.  · You develop pain, redness, or warmth in the swollen areas.    · You have heart, liver, or kidney disease and suddenly get edema.  · You have a fever and your symptoms suddenly get worse.  MAKE SURE YOU:   · Understand these instructions.  · Will watch your condition.  · Will get help right away if you are not doing well or get worse.     This information is not intended to replace advice given to you by your health care provider. Make sure you discuss any questions you have with your health care provider.     Document Released: 12/18/2006 Document Revised: 01/08/2016 Document Reviewed: 10/10/2014  [employer] Interactive Patient Education ©2016 [employer]

## 2017-12-07 NOTE — PROGRESS NOTES
Subjective:      PT is a 52 y.o. female who presents with Fever (Fever last night)               HPI  Pt was seen 6 days ago for B/L lower ext edema and started a double dose regimen of kdur and lasix. Pt notes much improvement in her lower legs and was concerned for her left  great toe with some minor discoloration.. PT notes she will see Vascular surgery in clinic on 17.  Pt has not taken any Rx medications for this condition. Pt states the pain is a 2-3/10, aching in nature and worse at night. Pt denies CP, SOB, NVD, paresthesias, headaches, dizziness, change in vision, hives, or other joint pain. The pt's medication list, problem list, and allergies have been evaluated and reviewed during today's visit.     PMH:  Past Medical History        Past Medical History:   Diagnosis Date   • Arthritis       right knee   • Dental disorder       partial upper   • Pain       left knee and arthritis pain            PSH:  Past Surgical History         Past Surgical History:   Procedure Laterality Date   • KNEE ARTHROPLASTY TOTAL Right 10/20/2015     Procedure: KNEE ARTHROPLASTY TOTAL;  Surgeon: Bryon Levine M.D.;  Location: [facility];  Service:    • APPENDECTOMY LAPAROSCOPIC   3/21/2013     Performed by Dali Queen M.D. at SURGERY [facility]   • DEBRIDEMENT   2012     Performed by BRADLEY DYKES at [facility]   • PLASTIC SURGERY        excess skin on arms and legs removed   • PB ENLARGE BREAST WITH IMPLANT      • GASTRIC BYPASS LAPAROSCOPIC        [place]   • ABDOMINAL EXPLORATION                Fam Hx:     family history includes Diabetes in her mother; Heart Disease in her mother.       Family Status   Relation Status   • Mother    • Father          Soc HX:  Social History   Social History            Social History   • Marital status: Single       Spouse name: N/A   • Number of children: N/A   • Years of education: N/A          Occupational  History   • Not on file.             Social History Main Topics   • Smoking status: Never Smoker   • Smokeless tobacco: Never Used   • Alcohol use 2.0 oz/week       4 Glasses of wine per week         Comment: 4 drinks a week   • Drug use: No   • Sexual activity: No           Other Topics Concern   • Not on file          Social History Narrative     ** Merged History Encounter **                     Medications:     Current Outpatient Prescriptions:   •  cyclobenzaprine (FLEXERIL) 5 MG tablet, Take 1-2 Tabs by mouth 3 times a day as needed., Disp: 30 Tab, Rfl: 0  •  furosemide (LASIX) 20 MG Tab, Take 1 Tab by mouth every day., Disp: 90 Tab, Rfl: 4  •  potassium chloride ER (KLOR-CON) 10 MEQ tablet, Take 1 Tab by mouth every day., Disp: 90 Tab, Rfl: 4  •  sulfamethoxazole-trimethoprim (BACTRIM DS) 800-160 MG tablet, Take 1 Tab by mouth 2 times a day for 7 days., Disp: 14 Tab, Rfl: 0  •  meloxicam (MOBIC) 15 MG tablet, TAKE ONE TABLET BY MOUTH ONCE DAILY WITH FOOD FOR  DJD, Disp: 90 Tab, Rfl: 0  •  oxycodone-acetaminophen (PERCOCET-10)  MG Tab, Take 1-2 Tabs by mouth every four hours as needed for Severe Pain., Disp: , Rfl:   •  morphine ER (MS CONTIN) 15 MG Tab CR tablet, Take 15 mg by mouth every 12 hours., Disp: , Rfl:   •  phentermine 37.5 MG capsule, Take 1 Cap by mouth every morning., Disp: 90 Cap, Rfl: 1  •  olopatadine (PATANOL) 0.1 % ophthalmic solution, Place 1 Drop in both eyes 2 times a day., Disp: 5 mL, Rfl: 11  •  oxycodone immediate-release (ROXICODONE) 5 MG Tab, Take 1 Tab by mouth every 8 hours as needed for Severe Pain., Disp: 90 Tab, Rfl: 0  •  cyanocobalamin (VITAMIN B12) 1000 MCG Tab, Take 1 Tab by mouth every day., Disp: 90 Tab, Rfl: 4  •  Lidocaine HCl 3 % Cream, Apply cream to affected area 2 times daily as needed, Disp: 1 Tube, Rfl: 0  •  Omega-3 Fatty Acids (FISH OIL) 1000 MG Cap capsule, Take 3,000 mg by mouth every day., Disp: , Rfl:   •  multivitamin (THERAGRAN) Tab, Take 1 Tab by  mouth every day., Disp: , Rfl:   •  vitamin D (CHOLECALCIFEROL) 1000 UNIT Tab, Take 1,000 Units by mouth every day., Disp: , Rfl:         Allergies:  Patient has no known allergies.     ROS     Constitutional: Negative for fever, chills and malaise/fatigue.   HENT: Negative for congestion and sore throat.    Eyes: Negative for blurred vision, double vision and photophobia.   Respiratory: Negative for cough and shortness of breath.    Cardiovascular: Negative for chest pain and palpitations.   Gastrointestinal: Negative for heartburn, nausea, vomiting, abdominal pain, diarrhea and constipation.   Genitourinary: Negative for dysuria and flank pain.   Musculoskeletal: POS for B/L leg edema and swelling and joint pain and myalgias.   Skin: Negative for itching and rash.   Neurological: Negative for dizziness, tingling and headaches.   Endo/Heme/Allergies: Does not bruise/bleed easily.   Psychiatric/Behavioral: Negative for depression. The patient is not nervous/anxious.           Objective:      Encounter Vitals  Standard Vitals  Vitals  Blood Pressure: 120/80  Temperature: 36.8 °C (98.2 °F)  Pulse: 78  Respiration: 20  Pulse Oximetry: 96 %  Encounter Vitals  Temperature: 36.8 °C (98.2 °F)  Temp Source: Temporal  Blood Pressure: 120/80  BP Location: Left, Upper Arm  Patient BP Position: Sitting  Pulse: 78  Respiration: 20  Pulse Oximetry: 96 %  Pulmonary-Specific Vitals            Physical Exam   Musculoskeletal:        Left lower leg: She exhibits tenderness, swelling and edema. She exhibits no bony tenderness, no deformity and no laceration.        Legs:      +much improved from last visit 6 days ago. The discoloration pt is concerned about in left great toe was only minor bruising.      Constitutional: PT is oriented to person, place, and time. PT appears well-developed and well-nourished. No distress.   HENT:   Head: Normocephalic and atraumatic.   Mouth/Throat: Oropharynx is clear and moist. No oropharyngeal  exudate.   Eyes: Conjunctivae normal and EOM are normal. Pupils are equal, round, and reactive to light.   Neck: Normal range of motion. Neck supple. No thyromegaly present.   Cardiovascular: Normal rate, regular rhythm, normal heart sounds and intact distal pulses.  Exam reveals no gallop and no friction rub.    No murmur heard.  Pulmonary/Chest: Effort normal and breath sounds normal. No respiratory distress. PT has no wheezes. PT has no rales. Pt exhibits no tenderness.   Abdominal: Soft. Bowel sounds are normal. PT exhibits no distension and no mass. There is no tenderness. There is no rebound and no guarding.   Neurological: PT is alert and oriented to person, place, and time. PT has normal reflexes. No cranial nerve deficit.   Skin: Skin is warm and dry. No rash noted. PT is not diaphoretic. No erythema.       Psychiatric: PT has a normal mood and affect. PT behavior is normal. Judgment and thought content normal.            Assessment/Plan:      1. Edema of both legs, peripheral        B/L lower legs much improved from last visit  Continue: - furosemide (LASIX) 20 MG Tab; Take 1 Tab by mouth every day.  Dispense: 90 Tab; Refill: 4  - potassium chloride ER (KLOR-CON) 10 MEQ tablet; Take 1 Tab by mouth every day.  Dispense: 90 Tab; Refill: 4  Rest, fluids encouraged.  Note given for work.  AVS with medical info given.  Pt was in full understanding and agreement with the plan.  Follow-up as needed if symptoms worsen or fail to improve.

## 2017-12-08 NOTE — PROGRESS NOTES
Subjective:   CC: Administrative encounter    HPI:     Karine Ovalle is a 52 y.o. Female with history of chronic low back pain with bilateral sciatica, chronic pain syndrome, lymphedema, primary osteoarthritis of the right knee status post total knee replacement in 2015 who presents for FMLA documentation.     Patient was seen by PCP on November 8, 2017. PCP filled out FMLA documentation. However, HR department needs clarification.     Patient had history of chronic low back pain with bilateral sciatica. She is taking Flexeril, meloxicam, and multiple opiates currently managed by pain management. Her symptoms are overall well controlled. She denies lower extremity weakness, numbness, tingling, bowel/bladder symptoms, saddle anesthesia.    She also had history of lymphedema. She is taking Lasix and potassium daily daily. Her symptoms are well controlled. She is working on dietary modification and weight loss.      Current medicines (including changes today)  Current Outpatient Prescriptions   Medication Sig Dispense Refill   • cyclobenzaprine (FLEXERIL) 5 MG tablet Take 1-2 Tabs by mouth 3 times a day as needed. 30 Tab 0   • furosemide (LASIX) 20 MG Tab Take 1 Tab by mouth every day. 90 Tab 4   • potassium chloride ER (KLOR-CON) 10 MEQ tablet Take 1 Tab by mouth every day. 90 Tab 4   • meloxicam (MOBIC) 15 MG tablet TAKE ONE TABLET BY MOUTH ONCE DAILY WITH FOOD FOR  DJD 90 Tab 0   • oxycodone-acetaminophen (PERCOCET-10)  MG Tab Take 1-2 Tabs by mouth every four hours as needed for Severe Pain.     • morphine ER (MS CONTIN) 15 MG Tab CR tablet Take 15 mg by mouth every 12 hours.     • phentermine 37.5 MG capsule Take 1 Cap by mouth every morning. 90 Cap 1   • olopatadine (PATANOL) 0.1 % ophthalmic solution Place 1 Drop in both eyes 2 times a day. 5 mL 11   • oxycodone immediate-release (ROXICODONE) 5 MG Tab Take 1 Tab by mouth every 8 hours as needed for Severe Pain. 90 Tab 0   • cyanocobalamin (VITAMIN  "B12) 1000 MCG Tab Take 1 Tab by mouth every day. 90 Tab 4   • Lidocaine HCl 3 % Cream Apply cream to affected area 2 times daily as needed 1 Tube 0   • Omega-3 Fatty Acids (FISH OIL) 1000 MG Cap capsule Take 3,000 mg by mouth every day.     • multivitamin (THERAGRAN) Tab Take 1 Tab by mouth every day.     • vitamin D (CHOLECALCIFEROL) 1000 UNIT Tab Take 1,000 Units by mouth every day.       No current facility-administered medications for this visit.      She  has a past medical history of Arthritis; Dental disorder; and Pain. She also has no past medical history of ASTHMA; Breast cancer (CMS-[facility]); or Diabetes.    I personally reviewed patient's problem list, allergies, medications, family hx, social hx with patient and update EPIC.     REVIEW OF SYSTEMS:  CONSTITUTIONAL:  Denies night sweats, fatigue, malaise, lethargy, fever or chills.  RESPIRATORY:  Denies cough, wheeze, hemoptysis, or shortness of breath.  CARDIOVASCULAR:  Denies chest pains, palpitations, pedal edema     Objective:     Blood pressure 118/70, pulse (!) 118, temperature 36.4 °C (97.6 °F), height 1.6 m (5' 3\"), weight 103.9 kg (229 lb), last menstrual period 05/17/2017, SpO2 97 %. Body mass index is 40.57 kg/m².    Physical Exam:  Constitutional: awake, alert, in no distress, morbidly obese  Skin: Warm, dry, good turgor, no rashes, bruises, ulcers in visible areas.  Eye: conjunctiva clear, lids neg for edema or lesions.  Respiratory: Unlabored respiratory effort, lungs clear to auscultation, no wheezes, no rhonchi, no rales.  Cardiovascular: Normal S1, S2, no murmur, 2+ pedal edema.  Psych: Oriented x3, affect and mood wnl, intact judgement and insight.       Assessment and Plan:   The following treatment plan was discussed    1. Chronic bilateral low back pain with bilateral sciatica- pain mgt, dr tommy Reyes, well controlled with Flexeril, meloxicam, and multiple opiates currently managed by pain management. Plan: f/u with pain management "     2. Lymphedema  Chronic, well controlled with Lasix/potassium. Recommended weight loss, leg elevation at rest, graduated compression stockings.     3. Administrative encounter- FMLA paperwork    FMLA documentation was modified and scanned into pt's chart.   Pt is advised to contact me or PCP if she has questions.     Nakia Goldsmith M.D.      Followup: Return for As needed.    Please note that this dictation was created using voice recognition software. I have made every reasonable attempt to correct obvious errors, but I expect that there are errors of grammar and possibly content that I did not discover before finalizing the note.

## 2017-12-11 ENCOUNTER — OFFICE VISIT (OUTPATIENT)
Dept: URGENT CARE | Facility: CLINIC | Age: 52
End: 2017-12-11
Payer: COMMERCIAL

## 2017-12-11 VITALS
DIASTOLIC BLOOD PRESSURE: 80 MMHG | HEART RATE: 78 BPM | SYSTOLIC BLOOD PRESSURE: 120 MMHG | OXYGEN SATURATION: 98 % | TEMPERATURE: 98 F | RESPIRATION RATE: 20 BRPM

## 2017-12-11 DIAGNOSIS — M79.604 PAIN OF RIGHT LOWER EXTREMITY: ICD-10-CM

## 2017-12-11 PROCEDURE — 99214 OFFICE O/P EST MOD 30 MIN: CPT | Performed by: FAMILY MEDICINE

## 2017-12-11 RX ORDER — CYCLOBENZAPRINE HCL 10 MG
10 TABLET ORAL 3 TIMES DAILY PRN
Qty: 20 TAB | Refills: 0 | Status: SHIPPED | OUTPATIENT
Start: 2017-12-11 | End: 2017-12-18

## 2017-12-11 RX ORDER — CEPHALEXIN 500 MG/1
500 CAPSULE ORAL 3 TIMES DAILY
Qty: 15 CAP | Refills: 0 | Status: SHIPPED | OUTPATIENT
Start: 2017-12-11 | End: 2017-12-16

## 2017-12-11 ASSESSMENT — ENCOUNTER SYMPTOMS
SPUTUM PRODUCTION: 0
CHILLS: 0
ORTHOPNEA: 0
FOCAL WEAKNESS: 0
COUGH: 0
FEVER: 0
DIZZINESS: 0
SHORTNESS OF BREATH: 0

## 2017-12-11 NOTE — LETTER
December 11, 2017         Patient: Karine Ovalle   YOB: 1965   Date of Visit: 12/11/2017           To Whom it May Concern:    Karine Ovalle was seen in my clinic on 12/11/2017. She may return to work tomorrow.    If you have any questions or concerns, please don't hesitate to call.        Sincerely,           Zander Bower M.D.  Electronically Signed

## 2017-12-12 ENCOUNTER — OFFICE VISIT (OUTPATIENT)
Dept: VASCULAR LAB | Facility: MEDICAL CENTER | Age: 52
End: 2017-12-12
Attending: INTERNAL MEDICINE
Payer: COMMERCIAL

## 2017-12-12 VITALS
WEIGHT: 229 LBS | BODY MASS INDEX: 40.57 KG/M2 | DIASTOLIC BLOOD PRESSURE: 90 MMHG | HEART RATE: 108 BPM | HEIGHT: 63 IN | SYSTOLIC BLOOD PRESSURE: 147 MMHG

## 2017-12-12 DIAGNOSIS — I89.0 LYMPHEDEMA: ICD-10-CM

## 2017-12-12 DIAGNOSIS — E66.01 SEVERE OBESITY (BMI >= 40) (HCC): ICD-10-CM

## 2017-12-12 DIAGNOSIS — I87.2 VENOUS INSUFFICIENCY: ICD-10-CM

## 2017-12-12 DIAGNOSIS — M79.89 LEG SWELLING: ICD-10-CM

## 2017-12-12 PROCEDURE — 99213 OFFICE O/P EST LOW 20 MIN: CPT | Performed by: NURSE PRACTITIONER

## 2017-12-12 PROCEDURE — 99212 OFFICE O/P EST SF 10 MIN: CPT | Performed by: NURSE PRACTITIONER

## 2017-12-12 NOTE — PROGRESS NOTES
Subjective:      Karine Ovalle is a 52 y.o. female who presents with Medication Refill (Need flexeril refill )    Chief Complaint   Patient presents with   • Medication Refill     Need flexeril refill         - This is a very pleasant 52 y.o. female with complaints of pain x 2 days Rt lower medial leg. No cp/sob. Seeing vascular clinic tomorrow. Worried her infection is coming back. alos ongoing pain Rt thigh s/p fall she had months ago and says flexeril helps.           ALLERGIES:  Patient has no known allergies.     PMH:  Past Medical History:   Diagnosis Date   • Arthritis     right knee   • Dental disorder     partial upper   • Pain     left knee and arthritis pain        MEDS:    Current Outpatient Prescriptions:   •  cyclobenzaprine (FLEXERIL) 10 MG Tab, Take 1 Tab by mouth 3 times a day as needed., Disp: 20 Tab, Rfl: 0  •  cephALEXin (KEFLEX) 500 MG Cap, Take 1 Cap by mouth 3 times a day for 5 days., Disp: 15 Cap, Rfl: 0  •  furosemide (LASIX) 20 MG Tab, Take 1 Tab by mouth every day., Disp: 90 Tab, Rfl: 4  •  potassium chloride ER (KLOR-CON) 10 MEQ tablet, Take 1 Tab by mouth every day., Disp: 90 Tab, Rfl: 4  •  meloxicam (MOBIC) 15 MG tablet, TAKE ONE TABLET BY MOUTH ONCE DAILY WITH FOOD FOR  DJD, Disp: 90 Tab, Rfl: 0  •  oxycodone-acetaminophen (PERCOCET-10)  MG Tab, Take 1-2 Tabs by mouth every four hours as needed for Severe Pain., Disp: , Rfl:   •  morphine ER (MS CONTIN) 15 MG Tab CR tablet, Take 15 mg by mouth every 12 hours., Disp: , Rfl:   •  phentermine 37.5 MG capsule, Take 1 Cap by mouth every morning., Disp: 90 Cap, Rfl: 1  •  olopatadine (PATANOL) 0.1 % ophthalmic solution, Place 1 Drop in both eyes 2 times a day., Disp: 5 mL, Rfl: 11  •  oxycodone immediate-release (ROXICODONE) 5 MG Tab, Take 1 Tab by mouth every 8 hours as needed for Severe Pain., Disp: 90 Tab, Rfl: 0  •  cyanocobalamin (VITAMIN B12) 1000 MCG Tab, Take 1 Tab by mouth every day., Disp: 90 Tab, Rfl: 4  •  Lidocaine  HCl 3 % Cream, Apply cream to affected area 2 times daily as needed, Disp: 1 Tube, Rfl: 0  •  Omega-3 Fatty Acids (FISH OIL) 1000 MG Cap capsule, Take 3,000 mg by mouth every day., Disp: , Rfl:   •  multivitamin (THERAGRAN) Tab, Take 1 Tab by mouth every day., Disp: , Rfl:   •  vitamin D (CHOLECALCIFEROL) 1000 UNIT Tab, Take 1,000 Units by mouth every day., Disp: , Rfl:     ** I have documented what I find to be significant in regards to past medical, social, family and surgical history  in my HPI or under PMH/PSH/FH review section, otherwise it is contributory **           HPI    Review of Systems   Constitutional: Negative for chills and fever.   Respiratory: Negative for cough, sputum production and shortness of breath.    Cardiovascular: Positive for leg swelling. Negative for chest pain and orthopnea.   Neurological: Negative for dizziness and focal weakness.          Objective:     /80   Pulse 78   Temp 36.7 °C (98 °F)   Resp 20   LMP 05/17/2017   SpO2 98%      Physical Exam   Constitutional: She appears well-developed. No distress.   HENT:   Head: Normocephalic and atraumatic.   Eyes: Conjunctivae are normal.   Neck: Neck supple.   Cardiovascular: Regular rhythm.    No murmur heard.  Pulmonary/Chest: Effort normal and breath sounds normal. No respiratory distress.   Musculoskeletal:        Legs:  Neurological: She is alert. She exhibits normal muscle tone.   Skin: Skin is warm and dry.   Psychiatric: She has a normal mood and affect. Judgment normal.   Nursing note and vitals reviewed.  RLE + edema and TTP              Assessment/Plan:         1. Pain of right lower extremity  cyclobenzaprine (FLEXERIL) 10 MG Tab    cephALEXin (KEFLEX) 500 MG Cap             Dx & d/c instructions discussed w/ patient and/or family members. Follow up w/ Prvt Dr or here in 1 day if not getting better, sooner if needed,  ER if worse and UC/PCP unavailable.        Possible side effects (i.e. Rash, GI  upset/constipation, sedation, elevation of BP or sugars) of any medications given discussed.

## 2017-12-13 ENCOUNTER — HOSPITAL ENCOUNTER (OUTPATIENT)
Dept: LAB | Facility: MEDICAL CENTER | Age: 52
End: 2017-12-13
Attending: NURSE PRACTITIONER
Payer: COMMERCIAL

## 2017-12-13 PROBLEM — M79.89 LEG SWELLING: Status: ACTIVE | Noted: 2017-07-26

## 2017-12-13 ASSESSMENT — ENCOUNTER SYMPTOMS
CLAUDICATION: 0
PALPITATIONS: 0
HEARTBURN: 0
FOCAL WEAKNESS: 0
DIZZINESS: 0
BACK PAIN: 1
SPEECH CHANGE: 0
NAUSEA: 0
DOUBLE VISION: 0
VOMITING: 0
HEADACHES: 0
BLURRED VISION: 0
DEPRESSION: 0
EYE PAIN: 0

## 2017-12-13 NOTE — PROGRESS NOTES
INITIAL VASCULAR VISIT  Subjective:   Karine Ovalle is a 52 y.o. female who presents today 12/13/2017 for   Chief Complaint   Patient presents with   • New Patient       HPI:  Pt here today for initial evaluation of chronic venous insufficiency and lymphedema.  For the past year she has had worsening LE swelling.  She has been to PT and wound care for lymphedema wrapping which she reports was helpful however she was unable to continue treatment due to the high co-pay.  She recently started lasix which helped some with the LE edema.  She has had LE ultrasounds to rule out blood clots which were negative.  A few months ago she had treatment for cellulitis on her right leg.  Denies any skin breakdown, warmth, redness, dryness or discoloration.  She has some hip pain and lower back pain for which she sees a pain doc regularly, but makes exercising difficult.  She gets epidurals regularly and is on multiple narcotics for pain relief.  Had a gastric bypass about 15 yrs ago and has gained most of her weight back.  Denies any CP, SOB, LE swelling or palpitations.  PMH/PSH listed below.             Past Medical History:   Diagnosis Date   • Arthritis     right knee   • Dental disorder     partial upper   • Pain     left knee and arthritis pain     Past Surgical History:   Procedure Laterality Date   • KNEE ARTHROPLASTY TOTAL Right 10/20/2015    Procedure: KNEE ARTHROPLASTY TOTAL;  Surgeon: Bryon Levine M.D.;  Location: [facility];  Service:    • APPENDECTOMY LAPAROSCOPIC  3/21/2013    Performed by Dali Queen M.D. at [facility]   • DEBRIDEMENT  5/17/2012    Performed by BRADLEY DYKES at [facility]   • PLASTIC SURGERY  2004    excess skin on arms and legs removed   • PB ENLARGE BREAST WITH IMPLANT  2004   • GASTRIC BYPASS LAPAROSCOPIC  2002    [place]   • ABDOMINAL EXPLORATION       Family History   Problem Relation Age of Onset   • Diabetes Mother    • Heart  Disease Mother      History   Smoking Status   • Never Smoker   Smokeless Tobacco   • Never Used     Social History   Substance Use Topics   • Smoking status: Never Smoker   • Smokeless tobacco: Never Used   • Alcohol use 2.0 oz/week     4 Glasses of wine per week      Comment: 4 drinks a week     Outpatient Encounter Prescriptions as of 12/12/2017   Medication Sig Dispense Refill   • cyclobenzaprine (FLEXERIL) 10 MG Tab Take 1 Tab by mouth 3 times a day as needed. 20 Tab 0   • cephALEXin (KEFLEX) 500 MG Cap Take 1 Cap by mouth 3 times a day for 5 days. 15 Cap 0   • furosemide (LASIX) 20 MG Tab Take 1 Tab by mouth every day. 90 Tab 4   • potassium chloride ER (KLOR-CON) 10 MEQ tablet Take 1 Tab by mouth every day. 90 Tab 4   • meloxicam (MOBIC) 15 MG tablet TAKE ONE TABLET BY MOUTH ONCE DAILY WITH FOOD FOR  DJD 90 Tab 0   • oxycodone-acetaminophen (PERCOCET-10)  MG Tab Take 1-2 Tabs by mouth every four hours as needed for Severe Pain.     • morphine ER (MS CONTIN) 15 MG Tab CR tablet Take 15 mg by mouth every 12 hours.     • phentermine 37.5 MG capsule Take 1 Cap by mouth every morning. 90 Cap 1   • olopatadine (PATANOL) 0.1 % ophthalmic solution Place 1 Drop in both eyes 2 times a day. 5 mL 11   • oxycodone immediate-release (ROXICODONE) 5 MG Tab Take 1 Tab by mouth every 8 hours as needed for Severe Pain. 90 Tab 0   • cyanocobalamin (VITAMIN B12) 1000 MCG Tab Take 1 Tab by mouth every day. 90 Tab 4   • Lidocaine HCl 3 % Cream Apply cream to affected area 2 times daily as needed 1 Tube 0   • Omega-3 Fatty Acids (FISH OIL) 1000 MG Cap capsule Take 3,000 mg by mouth every day.     • multivitamin (THERAGRAN) Tab Take 1 Tab by mouth every day.     • vitamin D (CHOLECALCIFEROL) 1000 UNIT Tab Take 1,000 Units by mouth every day.       No facility-administered encounter medications on file as of 12/12/2017.      No Known Allergies  DIET AND EXERCISE:  Weight Change: none  Diet: common adult  Exercise: no regular  "exercise program     Review of Systems   Constitutional: Positive for malaise/fatigue.   HENT: Negative for hearing loss.    Eyes: Negative for blurred vision, double vision and pain.   Cardiovascular: Positive for leg swelling. Negative for chest pain, palpitations and claudication.   Gastrointestinal: Negative for heartburn, nausea and vomiting.   Genitourinary: Negative for frequency, hematuria and urgency.   Musculoskeletal: Positive for back pain and joint pain.   Skin: Negative for itching and rash.   Neurological: Negative for dizziness, speech change, focal weakness and headaches.   Psychiatric/Behavioral: Negative for depression.      Objective:     Vitals:    12/12/17 1524   BP: 147/90   Pulse: (!) 108   Weight: 103.9 kg (229 lb)   Height: 1.6 m (5' 3\")      Body mass index is 40.57 kg/m².  Physical Exam   Constitutional: She is oriented to person, place, and time. She appears well-developed and well-nourished.   HENT:   Head: Normocephalic and atraumatic.   Eyes: Pupils are equal, round, and reactive to light.   Neck: Normal range of motion. No JVD present. No thyromegaly present.   Cardiovascular: Regular rhythm.    No murmur heard.  Pulmonary/Chest: Effort normal and breath sounds normal. No respiratory distress. She has no wheezes. She has no rales. She exhibits no tenderness.   Musculoskeletal: Normal range of motion. She exhibits edema. She exhibits no tenderness or deformity.   Neurological: She is alert and oriented to person, place, and time. Coordination normal.   Skin: Skin is warm and dry.   Psychiatric: She has a normal mood and affect. Her behavior is normal.   Vitals reviewed.    Lab Results   Component Value Date    CHOLSTRLTOT 144 06/12/2017    LDL 63 06/12/2017    HDL 69 06/12/2017    TRIGLYCERIDE 60 06/12/2017         Lab Results   Component Value Date    HBA1C 5.3 06/12/2017      Lab Results   Component Value Date    SODIUM 134 (L) 06/12/2017    POTASSIUM 3.9 06/12/2017    CHLORIDE 102 " 06/12/2017    CO2 26 06/12/2017    GLUCOSE 96 06/12/2017    BUN 11 06/12/2017    CREATININE 0.75 06/12/2017    IFAFRICA >60 06/12/2017    IFNOTAFR >60 06/12/2017        Lab Results   Component Value Date    WBC 7.3 06/12/2017    RBC 4.51 06/12/2017    HEMOGLOBIN 12.9 06/12/2017    HEMATOCRIT 37.3 06/12/2017    MCV 82.7 06/12/2017    MCH 28.6 06/12/2017    MCHC 34.6 06/12/2017    MPV 10.4 06/12/2017      Medical Decision Making:  Today's Assessment / Status / Plan:     1. Venous insufficiency  LE ART/EVELYNE   2. Lymphedema  REFERRAL TO PHYSICAL THERAPY Reason for Therapy: Eval/Treat/Report (lymphedema)   3. Leg swelling  B TYPE NATRIURETIC    REFERRAL TO PHYSICAL THERAPY Reason for Therapy: Eval/Treat/Report (lymphedema)   4. Severe obesity (BMI >= 40) (CMS-[facility])  LIPID PROFILE    COMP METABOLIC PANEL    COMP METABOLIC PANEL    MICROALBUMIN CREAT RATIO URINE     Patient Type: Primary Prevention    Etiology of Established CVD if Present:     Lipid Management: Qualifies for Statin Therapy Based on 2013 ACC/AHA Guidelines: unknown  Calculated 10-Year Risk of ASCVD: N/A  Currently on Statin: No  Does not appear to have recent lipid panel.  - Check lipid panel prior to next visit.    Blood Pressure Management:Goal: JNC8 (2013) Office BP Goal:<140/90; Under Control: yes  Elevated in office today she states due to rushing to appt since she was late.  Appears controlled at other office visits.  - Encouraged occasional home monitoring.    Glycemic Status: Normal    Anti-Platelet/Anti-Coagulant Tx: no    Smoking: Continue complete cessation     Physical Activity: advised to engage in walking, referred to physical therapy, frequency : goal is  3-4 times a week.  Stressed the importance of walking for improved circulation, weight loss and the    Weight Management and Nutrition: Dietary plan was discussed with patient at this visit including Mediterranean style diet, low carbohydrate.    Other:   1. Lymphedema- explained treatment  with continued wrapping with PT, elevation, use of compression stockings and increase in exercise as much as possible.  - Cont Lasix and K as directed by PCP.    2. Venous insufficiency- Check LE ART/EVELYNE to further determine vascular disease.  Check BNP.    Instructed to follow-up with PCP for remainder of adult medical needs: yes  We will partner with other providers in the management of established vascular disease and cardiometabolic risk factors.    Studies to Be Obtained: LE ART/EVELYNE  Labs to Be Obtained: lipid profile, CMP, BNP    Follow up in: 2 months    Marbella Williamson, A.P.N.     Agree with above.  Agree with arterial evaluation/EVELYNE to r/o concomitant arterial disease.  Will also obtain a venous duplex reflux study (ordered) to make sure that there is no isolated superficial reflux that might be treated.   Serum albumin, BNP, and TSH all normal.    Does not appear to be overall volume overloaded so would not increase diuretic.  May be reasonable to get a CT abd and pelvis in future to ensure there is no mass or other cause of external compression.    If no improvement with home compression, would recommend referral back to [facility] - can be evaluated by them for home pump if copays or transportation issues preclude regular follow up.    Michael Bloch, MD  [facility]

## 2017-12-14 ENCOUNTER — HOSPITAL ENCOUNTER (OUTPATIENT)
Dept: LAB | Facility: MEDICAL CENTER | Age: 52
End: 2017-12-14
Attending: NURSE PRACTITIONER
Payer: COMMERCIAL

## 2017-12-14 ENCOUNTER — OFFICE VISIT (OUTPATIENT)
Dept: URGENT CARE | Facility: CLINIC | Age: 52
End: 2017-12-14
Payer: COMMERCIAL

## 2017-12-14 VITALS
BODY MASS INDEX: 40.57 KG/M2 | RESPIRATION RATE: 15 BRPM | WEIGHT: 229 LBS | HEART RATE: 118 BPM | SYSTOLIC BLOOD PRESSURE: 100 MMHG | OXYGEN SATURATION: 100 % | DIASTOLIC BLOOD PRESSURE: 60 MMHG | HEIGHT: 63 IN | TEMPERATURE: 97.7 F

## 2017-12-14 DIAGNOSIS — M54.50 ACUTE LEFT-SIDED LOW BACK PAIN WITHOUT SCIATICA: ICD-10-CM

## 2017-12-14 DIAGNOSIS — H10.13 ALLERGIC CONJUNCTIVITIS OF BOTH EYES: ICD-10-CM

## 2017-12-14 LAB — BNP SERPL-MCNC: 13 PG/ML (ref 0–100)

## 2017-12-14 PROCEDURE — 36415 COLL VENOUS BLD VENIPUNCTURE: CPT

## 2017-12-14 PROCEDURE — 83880 ASSAY OF NATRIURETIC PEPTIDE: CPT

## 2017-12-14 PROCEDURE — 99214 OFFICE O/P EST MOD 30 MIN: CPT | Mod: 25 | Performed by: PHYSICIAN ASSISTANT

## 2017-12-14 PROCEDURE — 96372 THER/PROPH/DIAG INJ SC/IM: CPT | Performed by: PHYSICIAN ASSISTANT

## 2017-12-14 RX ORDER — CYCLOBENZAPRINE HCL 10 MG
10 TABLET ORAL 3 TIMES DAILY PRN
Qty: 30 TAB | Refills: 0 | Status: SHIPPED | OUTPATIENT
Start: 2017-12-14 | End: 2017-12-26

## 2017-12-14 RX ORDER — OLOPATADINE HYDROCHLORIDE 1 MG/ML
1 SOLUTION/ DROPS OPHTHALMIC 2 TIMES DAILY
Qty: 1 BOTTLE | Refills: 0 | Status: SHIPPED | OUTPATIENT
Start: 2017-12-14 | End: 2018-01-25

## 2017-12-14 RX ORDER — KETOROLAC TROMETHAMINE 30 MG/ML
60 INJECTION, SOLUTION INTRAMUSCULAR; INTRAVENOUS ONCE
Status: COMPLETED | OUTPATIENT
Start: 2017-12-14 | End: 2017-12-14

## 2017-12-14 RX ADMIN — KETOROLAC TROMETHAMINE 60 MG: 30 INJECTION, SOLUTION INTRAMUSCULAR; INTRAVENOUS at 14:38

## 2017-12-14 ASSESSMENT — ENCOUNTER SYMPTOMS
BACK PAIN: 1
EYE REDNESS: 1
SEIZURES: 0
EYE PAIN: 0
FEVER: 0
DIZZINESS: 0
TREMORS: 0
FOCAL WEAKNESS: 0
DOUBLE VISION: 0
SPEECH CHANGE: 0
BOWEL INCONTINENCE: 0
CHILLS: 0
ABDOMINAL PAIN: 0
COUGH: 0
TINGLING: 0
LEG PAIN: 1
LOSS OF CONSCIOUSNESS: 0
EYE DISCHARGE: 0
PERIANAL NUMBNESS: 0
SENSORY CHANGE: 0
SHORTNESS OF BREATH: 0
BLURRED VISION: 0
PALPITATIONS: 0
HEADACHES: 0

## 2017-12-14 NOTE — PATIENT INSTRUCTIONS
Degenerative Disk Disease  Degenerative disk disease is a condition caused by the changes that occur in spinal disks as you grow older. Spinal disks are soft and compressible disks located between the bones of your spine (vertebrae). These disks act like shock absorbers. Degenerative disk disease can affect the whole spine. However, the neck and lower back are most commonly affected. Many changes can occur in the spinal disks with aging, such as:  · The spinal disks may dry and shrink.  · Small tears may occur in the tough, outer covering of the disk (annulus).  · The disk space may become smaller due to loss of water.  · Abnormal growths in the bone (spurs) may occur. This can put pressure on the nerve roots exiting the spinal canal, causing pain.  · The spinal canal may become narrowed.  RISK FACTORS   · Being overweight.  · Having a family history of degenerative disk disease.  · Smoking.  · There is increased risk if you are doing heavy lifting or have a sudden injury.  SIGNS AND SYMPTOMS   Symptoms vary from person to person and may include:  · Pain that varies in intensity. Some people have no pain, while others have severe pain. The location of the pain depends on the part of your backbone that is affected.  ¨ You will have neck or arm pain if a disk in the neck area is affected.  ¨ You will have pain in your back, buttocks, or legs if a disk in the lower back is affected.  · Pain that becomes worse while bending, reaching up, or with twisting movements.  · Pain that may start gradually and then get worse as time passes. It may also start after a major or minor injury.  · Numbness or tingling in the arms or legs.  DIAGNOSIS   Your health care provider will ask you about your symptoms and about activities or habits that may cause the pain. He or she may also ask about any injuries, diseases, or treatments you have had. Your health care provider will examine you to check for the range of movement that is  possible in the affected area, to check for strength in your extremities, and to check for sensation in the areas of the arms and legs supplied by different nerve roots. You may also have:   · An X-ray of the spine.  · Other imaging tests, such as MRI.  TREATMENT   Your health care provider will advise you on the best plan for treatment. Treatment may include:  · Medicines.  · Rehabilitation exercises.  HOME CARE INSTRUCTIONS   · Follow proper lifting and walking techniques as advised by your health care provider.  · Maintain good posture.  · Exercise regularly as advised by your health care provider.  · Perform relaxation exercises.  · Change your sitting, standing, and sleeping habits as advised by your health care provider.  · Change positions frequently.  · Lose weight or maintain a healthy weight as advised by your health care provider.  · Do not use any tobacco products, including cigarettes, chewing tobacco, or electronic cigarettes. If you need help quitting, ask your health care provider.  · Wear supportive footwear.  · Take medicines only as directed by your health care provider.  SEEK MEDICAL CARE IF:   · Your pain does not go away within 1-4 weeks.  · You have significant appetite or weight loss.  SEEK IMMEDIATE MEDICAL CARE IF:   · Your pain is severe.  · You notice weakness in your arms, hands, or legs.  · You begin to lose control of your bladder or bowel movements.  · You have fevers or night sweats.  MAKE SURE YOU:   · Understand these instructions.  · Will watch your condition.  · Will get help right away if you are not doing well or get worse.     This information is not intended to replace advice given to you by your health care provider. Make sure you discuss any questions you have with your health care provider.     Document Released: 10/14/2008 Document Revised: 01/08/2016 Document Reviewed: 04/21/2015  [employer] Interactive Patient Education ©2016 [employer]    Venous Stasis or Chronic  Venous Insufficiency  Chronic venous insufficiency, also called venous stasis, is a condition that affects the veins in the legs. The condition prevents blood from being pumped through these veins effectively. Blood may no longer be pumped effectively from the legs back to the heart. This condition can range from mild to severe. With proper treatment, you should be able to continue with an active life.  CAUSES   Chronic venous insufficiency occurs when the vein walls become stretched, weakened, or damaged or when valves within the vein are damaged. Some common causes of this include:  · High blood pressure inside the veins (venous hypertension).  · Increased blood pressure in the leg veins from long periods of sitting or standing.  · A blood clot that blocks blood flow in a vein (deep vein thrombosis).  · Inflammation of a superficial vein (phlebitis) that causes a blood clot to form.  RISK FACTORS  Various things can make you more likely to develop chronic venous insufficiency, including:  · Family history of this condition.  · Obesity.  · Pregnancy.  · Sedentary lifestyle.  · Smoking.  · Jobs requiring long periods of standing or sitting in one place.  · Being a certain age. Women in their 40s and 50s and men in their 70s are more likely to develop this condition.  SIGNS AND SYMPTOMS   Symptoms may include:   · Varicose veins.  · Skin breakdown or ulcers.  · Reddened or discolored skin on the leg.  · Brown, smooth, tight, and painful skin just above the ankle, usually on the inside surface (lipodermatosclerosis).  · Swelling.  DIAGNOSIS   To diagnose this condition, your health care provider will take a medical history and do a physical exam. The following tests may be ordered to confirm the diagnosis:  · Duplex ultrasound--A procedure that produces a picture of a blood vessel and nearby organs and also provides information on blood flow through the blood vessel.  · Plethysmography--A procedure that tests blood  "flow.  · A venogram, or venography--A procedure used to look at the veins using X-ray and dye.  TREATMENT  The goals of treatment are to help you return to an active life and to minimize pain or disability. Treatment will depend on the severity of the condition. Medical procedures may be needed for severe cases. Treatment options may include:   · Use of compression stockings. These can help with symptoms and lower the chances of the problem getting worse, but they do not cure the problem.  · Sclerotherapy--A procedure involving an injection of a material that \"dissolves\" the damaged veins. Other veins in the network of blood vessels take over the function of the damaged veins.  · Surgery to remove the vein or cut off blood flow through the vein (vein stripping or laser ablation surgery).  · Surgery to repair a valve.  HOME CARE INSTRUCTIONS   · Wear compression stockings as directed by your health care provider.  · Only take over-the-counter or prescription medicines for pain, discomfort, or fever as directed by your health care provider.  · Follow up with your health care provider as directed.  SEEK MEDICAL CARE IF:   · You have redness, swelling, or increasing pain in the affected area.  · You see a red streak or line that extends up or down from the affected area.  · You have a breakdown or loss of skin in the affected area, even if the breakdown is small.  · You have an injury to the affected area.  SEEK IMMEDIATE MEDICAL CARE IF:   · You have an injury and open wound in the affected area.  · Your pain is severe and does not improve with medicine.  · You have sudden numbness or weakness in the foot or ankle below the affected area, or you have trouble moving your foot or ankle.  · You have a fever or persistent symptoms for more than 2-3 days.  · You have a fever and your symptoms suddenly get worse.  MAKE SURE YOU:   · Understand these instructions.  · Will watch your condition.  · Will get help right away if " you are not doing well or get worse.     This information is not intended to replace advice given to you by your health care provider. Make sure you discuss any questions you have with your health care provider.     Document Released: 04/22/2008 Document Revised: 10/08/2014 Document Reviewed: 08/25/2014  [employer] Interactive Patient Education ©2016 [employer]    Allergic Conjunctivitis  Allergic conjunctivitis is inflammation of the clear membrane that covers the white part of your eye and the inner surface of your eyelid (conjunctiva), and it is caused by allergies. The blood vessels in the conjunctiva become inflamed, and this causes the eye to become red or pink, and it often causes itchiness in the eye. Allergic conjunctivitis cannot be spread by one person to another person (noncontagious).  CAUSES  This condition is caused by an allergic reaction. Common causes of an allergic reaction (allergens) include:  · Dust.  · Pollen.  · Mold.  · Animal dander or secretions.  RISK FACTORS  This condition is more likely to develop if you are exposed to high levels of allergens that cause the allergic reaction. This might include being outdoors when air pollen levels are high or being around animals that you are allergic to.  SYMPTOMS  Symptoms of this condition may include:  · Eye redness.  · Tearing of the eyes.  · Watery eyes.  · Itchy eyes.  · Burning feeling in the eyes.  · Clear drainage from the eyes.  · Swollen eyelids.  DIAGNOSIS  This condition may be diagnosed by medical history and physical exam. If you have drainage from your eyes, it may be tested to rule out other causes of conjunctivitis.  TREATMENT  Treatment for this condition often includes medicines. These may be eye drops, ointments, or oral medicines. They may be prescription medicines or over-the-counter medicines.  HOME CARE INSTRUCTIONS  · Take or apply medicines only as directed by your health care provider.  · Do not touch or rub your  eyes.  · Do not wear contact lenses until the inflammation is gone. Wear glasses instead.  · Do not wear eye makeup until the inflammation is gone.  · Apply a cool, clean washcloth to your eye for 10-20 minutes, 3-4 times a day.  · Try to avoid whatever allergen is causing the allergic reaction.  SEEK MEDICAL CARE IF:  · Your symptoms get worse.  · You have pus draining from your eye.  · You have new symptoms.  · You have a fever.     This information is not intended to replace advice given to you by your health care provider. Make sure you discuss any questions you have with your health care provider.     Document Released: 03/09/2004 Document Revised: 01/08/2016 Document Reviewed: 09/29/2015  [employer] Interactive Patient Education ©2016 [employer]

## 2017-12-14 NOTE — PROGRESS NOTES
Subjective:      Karine Ovalle is a 52 y.o. female who presents with Back Pain (left side back pain, throbing pain, woke today with pain)            Back Pain   This is a chronic problem. The current episode started today. The problem is unchanged. The pain is present in the lumbar spine. The quality of the pain is described as cramping. The pain is moderate. Associated symptoms include leg pain. Pertinent negatives include no abdominal pain, bladder incontinence, bowel incontinence, chest pain, fever, headaches, perianal numbness or tingling. Risk factors include lack of exercise, obesity, menopause and sedentary lifestyle. She has tried muscle relaxant for the symptoms. The treatment provided significant relief.       Review of Systems   Constitutional: Negative for chills and fever.   Eyes: Positive for redness. Negative for blurred vision, double vision, pain and discharge.   Respiratory: Negative for cough and shortness of breath.    Cardiovascular: Negative for chest pain and palpitations.   Gastrointestinal: Negative for abdominal pain and bowel incontinence.   Genitourinary: Negative for bladder incontinence.   Musculoskeletal: Positive for back pain.   Skin: Negative for rash.   Neurological: Negative for dizziness, tingling, tremors, sensory change, speech change, focal weakness, seizures, loss of consciousness and headaches.   All other systems reviewed and are negative.    PMH:  has a past medical history of Arthritis; Dental disorder; and Pain. She also has no past medical history of ASTHMA; Breast cancer (CMS-[facility]); or Diabetes.  MEDS:   Current Outpatient Prescriptions:   •  cyclobenzaprine (FLEXERIL) 10 MG Tab, Take 1 Tab by mouth 3 times a day as needed., Disp: 30 Tab, Rfl: 0  •  olopatadine (PATANOL) 0.1 % ophthalmic solution, Place 1 Drop in both eyes 2 times a day., Disp: 1 Bottle, Rfl: 0  •  cyclobenzaprine (FLEXERIL) 10 MG Tab, Take 1 Tab by mouth 3 times a day as needed., Disp: 20 Tab,  Rfl: 0  •  cephALEXin (KEFLEX) 500 MG Cap, Take 1 Cap by mouth 3 times a day for 5 days., Disp: 15 Cap, Rfl: 0  •  furosemide (LASIX) 20 MG Tab, Take 1 Tab by mouth every day., Disp: 90 Tab, Rfl: 4  •  potassium chloride ER (KLOR-CON) 10 MEQ tablet, Take 1 Tab by mouth every day., Disp: 90 Tab, Rfl: 4  •  meloxicam (MOBIC) 15 MG tablet, TAKE ONE TABLET BY MOUTH ONCE DAILY WITH FOOD FOR  DJD, Disp: 90 Tab, Rfl: 0  •  oxycodone-acetaminophen (PERCOCET-10)  MG Tab, Take 1-2 Tabs by mouth every four hours as needed for Severe Pain., Disp: , Rfl:   •  morphine ER (MS CONTIN) 15 MG Tab CR tablet, Take 15 mg by mouth every 12 hours., Disp: , Rfl:   •  phentermine 37.5 MG capsule, Take 1 Cap by mouth every morning., Disp: 90 Cap, Rfl: 1  •  olopatadine (PATANOL) 0.1 % ophthalmic solution, Place 1 Drop in both eyes 2 times a day., Disp: 5 mL, Rfl: 11  •  oxycodone immediate-release (ROXICODONE) 5 MG Tab, Take 1 Tab by mouth every 8 hours as needed for Severe Pain., Disp: 90 Tab, Rfl: 0  •  cyanocobalamin (VITAMIN B12) 1000 MCG Tab, Take 1 Tab by mouth every day., Disp: 90 Tab, Rfl: 4  •  Lidocaine HCl 3 % Cream, Apply cream to affected area 2 times daily as needed, Disp: 1 Tube, Rfl: 0  •  Omega-3 Fatty Acids (FISH OIL) 1000 MG Cap capsule, Take 3,000 mg by mouth every day., Disp: , Rfl:   •  multivitamin (THERAGRAN) Tab, Take 1 Tab by mouth every day., Disp: , Rfl:   •  vitamin D (CHOLECALCIFEROL) 1000 UNIT Tab, Take 1,000 Units by mouth every day., Disp: , Rfl:   ALLERGIES: No Known Allergies  SURGHX:   Past Surgical History:   Procedure Laterality Date   • KNEE ARTHROPLASTY TOTAL Right 10/20/2015    Procedure: KNEE ARTHROPLASTY TOTAL;  Surgeon: Bryon Levine M.D.;  Location: SURGERY [facility];  Service:    • APPENDECTOMY LAPAROSCOPIC  3/21/2013    Performed by Dali Queen M.D. at SURGERY [facility] ORS   • DEBRIDEMENT  5/17/2012    Performed by BRADLEY DYKES at SURGERY [facility] ORS   • PLASTIC  "SURGERY  2004    excess skin on arms and legs removed   • PB ENLARGE BREAST WITH IMPLANT  2004   • GASTRIC BYPASS LAPAROSCOPIC  2002    [facility]   • ABDOMINAL EXPLORATION       SOCHX:  reports that she has never smoked. She has never used smokeless tobacco. She reports that she drinks about 2.0 oz of alcohol per week . She reports that she does not use drugs.  FH: Family history was reviewed, no pertinent findings to report'  Medications, Allergies, and current problem list reviewed today in Epic       Objective:     /60   Pulse (!) 118   Temp 36.5 °C (97.7 °F)   Resp 15   Ht 1.6 m (5' 3\")   Wt 103.9 kg (229 lb)   LMP 05/17/2017   SpO2 100%   BMI 40.57 kg/m²      Physical Exam   Constitutional: She is oriented to person, place, and time. She appears well-developed and well-nourished.   Eyes: Right conjunctiva is injected. Left conjunctiva is injected.   Cardiovascular: Normal rate, regular rhythm, normal heart sounds, intact distal pulses and normal pulses.    Pulmonary/Chest: Effort normal and breath sounds normal.   Musculoskeletal: She exhibits tenderness. She exhibits no edema or deformity.   Neurological: She is alert and oriented to person, place, and time. She has normal reflexes. She displays normal reflexes. She exhibits normal muscle tone. Coordination normal.   Skin: Skin is warm and dry.   Psychiatric: She has a normal mood and affect. Her behavior is normal. Judgment and thought content normal.   Vitals reviewed.              Assessment/Plan:   The patient is a 52-year-old female who presents with left-sided carotid back pain. She sees physical therapy, chiropractor and pain management for this problem. She is 10 degenerative disease in the lumbar spine. She also has a history of venous insufficiency with edema and both legs. She also has allergic conjunctivitis. She states that the pain is similar to her previous problems. Usually she takes Flexeril and Toradol which relieves the " pain.    1. Acute left-sided low back pain without sciatica    - cyclobenzaprine (FLEXERIL) 10 MG Tab; Take 1 Tab by mouth 3 times a day as needed.  Dispense: 30 Tab; Refill: 0  - ketorolac (TORADOL) injection 60 mg; 2 mL by Intramuscular route Once.    2. Allergic conjunctivitis of both eyes    - olopatadine (PATANOL) 0.1 % ophthalmic solution; Place 1 Drop in both eyes 2 times a day.  Dispense: 1 Bottle; Refill: 0    Differential diagnosis, natural history, supportive care, and indications for immediate follow-up discussed at length.   Follow-up with primary care provider within 4-5 days, emergency room precautions discussed.  Patient and/or family appears understanding of information.

## 2017-12-14 NOTE — LETTER
December 14, 2017         Patient: Karine Ovalle   YOB: 1965   Date of Visit: 12/14/2017           To Whom it May Concern:    Karine Ovalle was seen in my clinic on 12/14/2017. Please excuse her from work today.  She may return tomorrow.    If you have any questions or concerns, please don't hesitate to call.        Sincerely,           Steve Hurst P.A.-C.  Electronically Signed

## 2017-12-18 ENCOUNTER — HOSPITAL ENCOUNTER (EMERGENCY)
Facility: MEDICAL CENTER | Age: 52
End: 2017-12-18
Attending: EMERGENCY MEDICINE
Payer: COMMERCIAL

## 2017-12-18 ENCOUNTER — OFFICE VISIT (OUTPATIENT)
Dept: MEDICAL GROUP | Age: 52
End: 2017-12-18
Payer: COMMERCIAL

## 2017-12-18 ENCOUNTER — APPOINTMENT (OUTPATIENT)
Dept: RADIOLOGY | Facility: MEDICAL CENTER | Age: 52
End: 2017-12-18
Attending: EMERGENCY MEDICINE
Payer: COMMERCIAL

## 2017-12-18 VITALS
DIASTOLIC BLOOD PRESSURE: 78 MMHG | TEMPERATURE: 98 F | BODY MASS INDEX: 40.57 KG/M2 | SYSTOLIC BLOOD PRESSURE: 135 MMHG | OXYGEN SATURATION: 93 % | HEART RATE: 109 BPM | HEIGHT: 63 IN | WEIGHT: 229 LBS

## 2017-12-18 VITALS
BODY MASS INDEX: 40.57 KG/M2 | OXYGEN SATURATION: 99 % | DIASTOLIC BLOOD PRESSURE: 81 MMHG | HEART RATE: 100 BPM | SYSTOLIC BLOOD PRESSURE: 137 MMHG | WEIGHT: 229 LBS | TEMPERATURE: 98.3 F | HEIGHT: 63 IN | RESPIRATION RATE: 16 BRPM

## 2017-12-18 DIAGNOSIS — G89.29 CHRONIC BILATERAL LOW BACK PAIN WITH BILATERAL SCIATICA: ICD-10-CM

## 2017-12-18 DIAGNOSIS — M54.41 CHRONIC BILATERAL LOW BACK PAIN WITH BILATERAL SCIATICA: ICD-10-CM

## 2017-12-18 DIAGNOSIS — M54.42 CHRONIC BILATERAL LOW BACK PAIN WITH BILATERAL SCIATICA: ICD-10-CM

## 2017-12-18 DIAGNOSIS — M54.40 BILATERAL LOW BACK PAIN WITH SCIATICA, SCIATICA LATERALITY UNSPECIFIED, UNSPECIFIED CHRONICITY: ICD-10-CM

## 2017-12-18 DIAGNOSIS — M25.552 ACUTE PAIN OF LEFT HIP: Primary | ICD-10-CM

## 2017-12-18 PROBLEM — Z00.00 HEALTHCARE MAINTENANCE: Status: RESOLVED | Noted: 2017-08-09 | Resolved: 2017-12-18

## 2017-12-18 PROBLEM — Z02.9 ADMINISTRATIVE ENCOUNTER: Status: RESOLVED | Noted: 2017-07-26 | Resolved: 2017-12-18

## 2017-12-18 PROBLEM — L02.419 CELLULITIS AND ABSCESS OF LEG: Status: RESOLVED | Noted: 2017-08-09 | Resolved: 2017-12-18

## 2017-12-18 PROBLEM — Z79.891 CHRONIC USE OF OPIATE DRUGS THERAPEUTIC PURPOSES: Status: RESOLVED | Noted: 2017-08-09 | Resolved: 2017-12-18

## 2017-12-18 PROBLEM — L03.119 CELLULITIS AND ABSCESS OF LEG: Status: RESOLVED | Noted: 2017-08-09 | Resolved: 2017-12-18

## 2017-12-18 PROCEDURE — 99214 OFFICE O/P EST MOD 30 MIN: CPT | Performed by: FAMILY MEDICINE

## 2017-12-18 PROCEDURE — 99284 EMERGENCY DEPT VISIT MOD MDM: CPT

## 2017-12-18 PROCEDURE — 72100 X-RAY EXAM L-S SPINE 2/3 VWS: CPT

## 2017-12-18 PROCEDURE — 73521 X-RAY EXAM HIPS BI 2 VIEWS: CPT

## 2017-12-18 RX ORDER — IBUPROFEN 800 MG/1
800 TABLET ORAL EVERY 8 HOURS PRN
Qty: 30 TAB | Refills: 0 | Status: SHIPPED | OUTPATIENT
Start: 2017-12-18 | End: 2017-12-26 | Stop reason: SDUPTHER

## 2017-12-18 RX ORDER — CYCLOBENZAPRINE HCL 5 MG
5-10 TABLET ORAL 3 TIMES DAILY PRN
Qty: 10 TAB | Refills: 0 | Status: SHIPPED | OUTPATIENT
Start: 2017-12-18 | End: 2017-12-26 | Stop reason: SDUPTHER

## 2017-12-18 ASSESSMENT — PAIN SCALES - GENERAL: PAINLEVEL_OUTOF10: 8

## 2017-12-18 NOTE — PROGRESS NOTES
Subjective:   CC: Acute left hip pain.    HPI:     Karine Ovalle is a 52 y.o. female with history of chronic bilateral low back pain with bilateral sciatica and chronic pain syndrome currently taking morphine ER, Roxicodone, and Percocet for pain. Her medication is currently being managed by Dr. Paz. Patient presented to urgent care on December 14, 2017 for acute worsening lumbosacral back pain. She was treated with Toradol injection and Flexeril. She presents today to request work release as her symptoms are improving. However, patient appears to be in significant pain during office visit. She constantly complains of acute left hip pain. She states that her pain feels like a kink of muscles of the left hip. Pain is described as sharp, nonradiating, worse with getting getting up from sitting position. She was icing the affected area during office visit.    Patient states that she needs to go back to work as she is no longer able to afford absence from work. However, patient develops significant pain when she was trying to get up from the office chair. She requests to be transferred to the hospital for pain management.    Current medicines (including changes today)  Current Outpatient Prescriptions   Medication Sig Dispense Refill   • cyclobenzaprine (FLEXERIL) 10 MG Tab Take 1 Tab by mouth 3 times a day as needed. 30 Tab 0   • olopatadine (PATANOL) 0.1 % ophthalmic solution Place 1 Drop in both eyes 2 times a day. 1 Bottle 0   • furosemide (LASIX) 20 MG Tab Take 1 Tab by mouth every day. 90 Tab 4   • potassium chloride ER (KLOR-CON) 10 MEQ tablet Take 1 Tab by mouth every day. 90 Tab 4   • meloxicam (MOBIC) 15 MG tablet TAKE ONE TABLET BY MOUTH ONCE DAILY WITH FOOD FOR  DJD 90 Tab 0   • oxycodone-acetaminophen (PERCOCET-10)  MG Tab Take 1-2 Tabs by mouth every four hours as needed for Severe Pain.     • morphine ER (MS CONTIN) 15 MG Tab CR tablet Take 15 mg by mouth every 12 hours.     • phentermine  "37.5 MG capsule Take 1 Cap by mouth every morning. 90 Cap 1   • olopatadine (PATANOL) 0.1 % ophthalmic solution Place 1 Drop in both eyes 2 times a day. 5 mL 11   • oxycodone immediate-release (ROXICODONE) 5 MG Tab Take 1 Tab by mouth every 8 hours as needed for Severe Pain. 90 Tab 0   • cyanocobalamin (VITAMIN B12) 1000 MCG Tab Take 1 Tab by mouth every day. 90 Tab 4   • Lidocaine HCl 3 % Cream Apply cream to affected area 2 times daily as needed 1 Tube 0   • Omega-3 Fatty Acids (FISH OIL) 1000 MG Cap capsule Take 3,000 mg by mouth every day.     • multivitamin (THERAGRAN) Tab Take 1 Tab by mouth every day.     • vitamin D (CHOLECALCIFEROL) 1000 UNIT Tab Take 1,000 Units by mouth every day.       No current facility-administered medications for this visit.      She  has a past medical history of Arthritis; Chronic back pain; Dental disorder; and Pain. She also has no past medical history of ASTHMA; Breast cancer (CMS-HCC); or Diabetes.    I personally reviewed patient's problem list, allergies, medications, family hx, social hx with patient and update EPIC.     REVIEW OF SYSTEMS:  CONSTITUTIONAL:  Denies night sweats, fatigue, malaise, lethargy, fever or chills.  RESPIRATORY:  Denies cough, wheeze, hemoptysis, or shortness of breath.  CARDIOVASCULAR:  Denies chest pains, palpitations, pedal edema     Objective:     Blood pressure 135/78, pulse (!) 109, temperature 36.7 °C (98 °F), height 1.6 m (5' 3\"), weight 103.9 kg (229 lb), last menstrual period 05/17/2017, SpO2 93 %. Body mass index is 40.57 kg/m².    Physical Exam:  Constitutional: awake, alert, in no distress, morbidly obese.  Skin: Warm, dry, good turgor, no rashes, bruises, ulcers in visible areas.  Neck: Trachea midline, no masses, no thyromegaly. No cervical or supraclavicular lymphadenopathy  Respiratory: Unlabored respiratory effort, lungs clear to auscultation, no wheezes, no rhonchi, no rales.  Cardiovascular: Normal S1, S2, no murmur, no pedal " edema.  Psych: Oriented x3, affect and mood wnl, intact judgement and insight.   MSK: did not perform due to severe pain.     Assessment and Plan:   The following treatment plan was discussed    1. Chronic bilateral low back pain with bilateral sciatica- pain mgt, dr sanders  2. Acute pain of left hip  Pt was transported to the hospital by EMS for pain management.     Total 25 minutes face-to-face time spent with patient, with greater than 50% of the total time discussing patient's issues and symptoms as listed above in assessment and plan, as well as managing coordination of care for future evaluation and treatment.    Nakia Goldsmith M.D.      Followup: Return for As needed.    Please note that this dictation was created using voice recognition software. I have made every reasonable attempt to correct obvious errors, but I expect that there are errors of grammar and possibly content that I did not discover before finalizing the note.

## 2017-12-18 NOTE — LETTER
December 18, 2017        Re: Karine Ovalle    To whom it may concern,    My name is Nakia Goldsmith MD. I am taking care of Karine Ovalle, who is suffering acute illness that requires treatment. Please excuse Karine Ovalle from working duties from 12/15/17 to 12/18/2017. Karine can return to work on 12/19/2017.    If you have any questions, please do not hesitate to call my office.     Best regards,                 Nakia Goldsmith

## 2017-12-18 NOTE — ED NOTES
"Chief Complaint   Patient presents with   • Hip Pain     Left   • Sent from Urgent Care     seen at  for back pain     /91   Pulse 100   Temp 36.8 °C (98.3 °F)   Resp 16   Ht 1.6 m (5' 3\")   Wt 103.9 kg (229 lb)   LMP 05/17/2017   SpO2 96%   BMI 40.57 kg/m²     Pt BIB [facility] from home for c/o sudden onset Left Hip pain, states \"it fluctuates.\"  Pt has been seen for chronic back pain.    "

## 2017-12-19 ENCOUNTER — PATIENT OUTREACH (OUTPATIENT)
Dept: HEALTH INFORMATION MANAGEMENT | Facility: OTHER | Age: 52
End: 2017-12-19

## 2017-12-19 NOTE — DISCHARGE INSTRUCTIONS
Chronic Back Pain   When back pain lasts longer than 3 months, it is called chronic back pain. People with chronic back pain often go through certain periods that are more intense (flare-ups).   CAUSES  Chronic back pain can be caused by wear and tear (degeneration) on different structures in your back. These structures include:  · The bones of your spine (vertebrae) and the joints surrounding your spinal cord and nerve roots (facets).  · The strong, fibrous tissues that connect your vertebrae (ligaments).  Degeneration of these structures may result in pressure on your nerves. This can lead to constant pain.  HOME CARE INSTRUCTIONS  · Avoid bending, heavy lifting, prolonged sitting, and activities which make the problem worse.  · Take brief periods of rest throughout the day to reduce your pain. Lying down or standing usually is better than sitting while you are resting.  · Take over-the-counter or prescription medicines only as directed by your caregiver.  SEEK IMMEDIATE MEDICAL CARE IF:   · You have weakness or numbness in one of your legs or feet.  · You have trouble controlling your bladder or bowels.  · You have nausea, vomiting, abdominal pain, shortness of breath, or fainting.     This information is not intended to replace advice given to you by your health care provider. Make sure you discuss any questions you have with your health care provider.     Document Released: 01/25/2006 Document Revised: 03/11/2013 Document Reviewed: 12/01/2012  [employer] Interactive Patient Education ©2016 [employer]

## 2017-12-19 NOTE — ED PROVIDER NOTES
"ED Provider Note    CHIEF COMPLAINT  Chief Complaint   Patient presents with   • Hip Pain     Left   • Sent from Urgent Care     seen at  for back pain       HPI  Karine Ovalle is a 52 y.o. female here for evaluation of bilateral hip pain and low back pain. The patient states she has chronic history of low back pain, and left-sided hip pain, however over the last 1-2 months she has developed right hip pain. She has no fever, no vomiting, no chest pain, no shortness of breath. She has no abdominal pain. Patient states that she takes pain medications for these symptoms, but is here because she is wondering if she has \"for the arthritis in the hips.\"    PAST MEDICAL HISTORY   has a past medical history of Arthritis; Chronic back pain; Dental disorder; and Pain.    SOCIAL HISTORY  Social History     Social History Main Topics   • Smoking status: Never Smoker   • Smokeless tobacco: Never Used   • Alcohol use 2.0 oz/week     4 Glasses of wine per week      Comment: 4 drinks a week   • Drug use: No   • Sexual activity: No       SURGICAL HISTORY   has a past surgical history that includes gastric bypass laparoscopic (2002); abdominal exploration; plastic surgery (2004); debridement (5/17/2012); enlarge breast with implant (2004); appendectomy laparoscopic (3/21/2013); and knee arthroplasty total (Right, 10/20/2015).    CURRENT MEDICATIONS  Home Medications    **Home medications have not yet been reviewed for this encounter**         ALLERGIES  No Known Allergies    REVIEW OF SYSTEMS  See HPI for further details. Review of systems as above, otherwise all other systems are negative.     PHYSICAL EXAM  VITAL SIGNS: /91   Pulse 100   Temp 36.8 °C (98.3 °F)   Resp 16   Ht 1.6 m (5' 3\")   Wt 103.9 kg (229 lb)   LMP 05/17/2017   SpO2 96%   BMI 40.57 kg/m²     Constitutional: Well developed, well nourished. No acute distress.  HEENT: Normocephalic, atraumatic. MMM  Neck: Supple, Full range of motion "   Chest/Pulmonary:  No respiratory distress.  Equal expansion   Musculoskeletal: No deformity, no edema, neurovascular intact. Tenderness to bilateral greater trochanters, neurovascular intact distally, nontender knees  Back; tenderness to L2, L3 midline. No obvious step-off or deformity  Neuro: Clear speech, appropriate, cooperative, cranial nerves II-XII grossly intact. Patient ambulates with a steady gait, unassisted  Psych: Normal mood and affect    DX-HIP-BILATERAL-WITH PELVIS-2 VIEWS   Final Result      Severe degenerative change of the hip articulations bilaterally, right greater than left. This has progressed from comparison.      DX-LUMBAR SPINE-2 OR 3 VIEWS   Final Result      1.  Multilevel degenerative disc disease and facet degeneration.      2.  Degenerative change of the hip articulations bilaterally, right greater than left.              PROCEDURES     MEDICAL RECORD  I have reviewed patient's medical record and pertinent results are listed above.    COURSE & MEDICAL DECISION MAKING  I have reviewed any medical record information, laboratory studies and radiographic results as noted above.    I you have had any blood pressure issues while here in the emergency department, please see your doctor for a further evaluation or work up.    6:20 PM  The patient has no incontinence to bowel bladder, no urinary retention, and no saddle anesthesia. She is able to ambulate without difficulty, and will be provided anti-inflammatories for discomfort and pain. She will follow-up with her doctor for any further issues or concerns.    Differential diagnoses include but not limited to: cauda equina, hip strain, fracture, epidural abscess, chronic pain    This patient presents with chronic back pain and hip pain .  At this time, I have counseled the patient/family regarding their medications, pain control, and follow up.  They will continue their medications, if any, as prescribed.  They will return immediately for  any worsening symptoms and/or any other medical concerns.  They will see their doctor, or contact the doctor provided, in 1-2 days for follow up.       FINAL IMPRESSION  1. Bilateral low back pain with sciatica, sciatica laterality unspecified, unspecified chronicity            Electronically signed by: Antony Herrmann, 12/18/2017 5:10 PM

## 2017-12-21 ENCOUNTER — HOSPITAL ENCOUNTER (OUTPATIENT)
Dept: RADIOLOGY | Facility: MEDICAL CENTER | Age: 52
End: 2017-12-21
Attending: NURSE PRACTITIONER
Payer: COMMERCIAL

## 2017-12-21 DIAGNOSIS — M79.89 LEG SWELLING: ICD-10-CM

## 2017-12-21 DIAGNOSIS — I87.2 VENOUS INSUFFICIENCY: ICD-10-CM

## 2017-12-21 DIAGNOSIS — I89.0 LYMPHEDEMA: ICD-10-CM

## 2017-12-21 PROCEDURE — 93970 EXTREMITY STUDY: CPT | Mod: 26 | Performed by: SURGERY

## 2017-12-21 PROCEDURE — 93970 EXTREMITY STUDY: CPT

## 2017-12-26 ENCOUNTER — OFFICE VISIT (OUTPATIENT)
Dept: MEDICAL GROUP | Age: 52
End: 2017-12-26
Payer: COMMERCIAL

## 2017-12-26 DIAGNOSIS — M54.41 CHRONIC BILATERAL LOW BACK PAIN WITH BILATERAL SCIATICA: ICD-10-CM

## 2017-12-26 DIAGNOSIS — M54.42 CHRONIC BILATERAL LOW BACK PAIN WITH BILATERAL SCIATICA: ICD-10-CM

## 2017-12-26 DIAGNOSIS — R22.41 LEG MASS, RIGHT: ICD-10-CM

## 2017-12-26 DIAGNOSIS — Z02.9 ADMINISTRATIVE ENCOUNTER: ICD-10-CM

## 2017-12-26 DIAGNOSIS — G89.29 CHRONIC BILATERAL LOW BACK PAIN WITH BILATERAL SCIATICA: ICD-10-CM

## 2017-12-26 DIAGNOSIS — E66.01 SEVERE OBESITY (BMI >= 40) (HCC): ICD-10-CM

## 2017-12-26 PROBLEM — M79.89 LEG SWELLING: Status: RESOLVED | Noted: 2017-07-26 | Resolved: 2017-12-26

## 2017-12-26 PROCEDURE — 99214 OFFICE O/P EST MOD 30 MIN: CPT | Performed by: FAMILY MEDICINE

## 2017-12-26 RX ORDER — IBUPROFEN 800 MG/1
800 TABLET ORAL EVERY 8 HOURS PRN
Qty: 30 TAB | Refills: 0 | Status: SHIPPED | OUTPATIENT
Start: 2017-12-26 | End: 2018-03-07

## 2017-12-26 RX ORDER — CYCLOBENZAPRINE HCL 5 MG
5-10 TABLET ORAL 3 TIMES DAILY PRN
Qty: 30 TAB | Refills: 0 | Status: SHIPPED | OUTPATIENT
Start: 2017-12-26 | End: 2018-01-16

## 2017-12-27 VITALS
DIASTOLIC BLOOD PRESSURE: 70 MMHG | WEIGHT: 229 LBS | BODY MASS INDEX: 40.57 KG/M2 | HEART RATE: 99 BPM | OXYGEN SATURATION: 99 % | SYSTOLIC BLOOD PRESSURE: 136 MMHG | HEIGHT: 63 IN | TEMPERATURE: 98 F

## 2017-12-27 NOTE — PROGRESS NOTES
Subjective:   CC: leg mass    HPI:     Karine Ovalle is a 52 y.o. female, established patient of the clinic, presents with the following concerns:     1. Leg mass, right  Pt c/o chronic tender leg mass at the right medial calf. Pt states that it was US several times, and she was told that it is benign tissue. However, she continue to suffer pain in this area and is interested in biopsy to find out cause. She denies hx of trauma to affected area.     2. Severe obesity (BMI >= 40) (CMS-HCC)  Pt is morbidly obese s/p gastric bypass surgery in 2002, but has regained most of her weight due to the lack of lifestyle modification. Pt states that she has chronic back pain that prevents her from exercise. She is working on diet but ineffective. She is dealing with significant chronic pain that it is very hard for her to find motivation to work on weight loss.     3. Chronic bilateral low back pain with bilateral sciatica- pain mgt, dr tommy Reyes, on chronic pain meds currently being managed by Dr. Chung.   She continues to suffer significant pain. Last week, she was transported from this clinic to ER for acute worsening pain. She c/o significant back muscle spasm despite pain meds. Flexeril appears to help, but she ran out.     4. Administrative encounter  She requests documentation for recent ED visit and release to return to work.     Current medicines (including changes today)  Current Outpatient Prescriptions   Medication Sig Dispense Refill   • cyclobenzaprine (FLEXERIL) 5 MG tablet Take 1-2 Tabs by mouth 3 times a day as needed. 30 Tab 0   • ibuprofen (MOTRIN) 800 MG Tab Take 1 Tab by mouth every 8 hours as needed. 30 Tab 0   • olopatadine (PATANOL) 0.1 % ophthalmic solution Place 1 Drop in both eyes 2 times a day. 1 Bottle 0   • furosemide (LASIX) 20 MG Tab Take 1 Tab by mouth every day. 90 Tab 4   • potassium chloride ER (KLOR-CON) 10 MEQ tablet Take 1 Tab by mouth every day. 90 Tab 4   •  "oxycodone-acetaminophen (PERCOCET-10)  MG Tab Take 1-2 Tabs by mouth every four hours as needed for Severe Pain.     • morphine ER (MS CONTIN) 15 MG Tab CR tablet Take 15 mg by mouth every 12 hours.     • phentermine 37.5 MG capsule Take 1 Cap by mouth every morning. 90 Cap 1   • olopatadine (PATANOL) 0.1 % ophthalmic solution Place 1 Drop in both eyes 2 times a day. 5 mL 11   • oxycodone immediate-release (ROXICODONE) 5 MG Tab Take 1 Tab by mouth every 8 hours as needed for Severe Pain. 90 Tab 0   • cyanocobalamin (VITAMIN B12) 1000 MCG Tab Take 1 Tab by mouth every day. 90 Tab 4   • Lidocaine HCl 3 % Cream Apply cream to affected area 2 times daily as needed 1 Tube 0   • Omega-3 Fatty Acids (FISH OIL) 1000 MG Cap capsule Take 3,000 mg by mouth every day.     • multivitamin (THERAGRAN) Tab Take 1 Tab by mouth every day.     • vitamin D (CHOLECALCIFEROL) 1000 UNIT Tab Take 1,000 Units by mouth every day.       No current facility-administered medications for this visit.      She  has a past medical history of Arthritis; Chronic back pain; Dental disorder; and Pain. She also has no past medical history of ASTHMA; Breast cancer (CMS-[facility]); or Diabetes.    I personally reviewed patient's problem list, allergies, medications, family hx, social hx with patient and update EPIC.     REVIEW OF SYSTEMS:  CONSTITUTIONAL:  Denies night sweats, fatigue, malaise, lethargy, fever or chills.  RESPIRATORY:  Denies cough, wheeze, hemoptysis, or shortness of breath.  CARDIOVASCULAR:  Denies chest pains, palpitations, pedal edema     Objective:     Blood pressure 136/70, pulse 99, temperature 36.7 °C (98 °F), height 1.6 m (5' 2.99\"), weight 103.9 kg (229 lb), last menstrual period 05/17/2017, SpO2 99 %. Body mass index is 40.58 kg/m².    Physical Exam:  Constitutional: awake, alert, in no distress, morbidly obese.  Skin: Warm, dry, good turgor, no rashes, bruises, ulcers in visible areas.  - 2x5 cm tender, firm, mobile mass " without surrounding erythema or skin rash at the medial right calf.   - there is a slightly smaller mass at the anterior right shin with skin sensitivity to palpation.   Eye: conjunctiva clear, lids neg for edema or lesions.  Neck: Trachea midline, no masses, no thyromegaly. No cervical or supraclavicular lymphadenopathy  Respiratory: Unlabored respiratory effort, lungs clear to auscultation, no wheezes, no rhonchi, no rales.  Cardiovascular: Normal S1, S2, no murmur, no pedal edema.  Psych: Oriented x3, affect and mood wnl, intact judgement and insight.       Assessment and Plan:   The following treatment plan was discussed    1. Leg mass, right  Pt c/o chronic tender leg mass at the right medial calf. Pt states that it was US several times, and she was told that it is benign tissue. However, she continue to suffer pain in this area and is interested in biopsy. Exam noted for 2x5 cm tender mass without surrounding erythema or skin rash at the medial right calf, likely inflamed lipoma or possible erythema nodosum. Plan:   - RTC for biopsy.    2. Severe obesity (BMI >= 40) (CMS-HCC)  - discussed dietary modification, exercise, stress reduction, avoid stress eating. Might benefit from HIP referral, but she is not interested at this time.     3. Chronic bilateral low back pain with bilateral sciatica- pain mgt, dr sanders  - f/u with pain management. Continue all pain meds as directed.   - cyclobenzaprine (FLEXERIL) 5 MG tablet; Take 1-2 Tabs by mouth 3 times a day as needed.  Dispense: 30 Tab; Refill: 0  - ibuprofen (MOTRIN) 800 MG Tab; Take 1 Tab by mouth every 8 hours as needed.  Dispense: 30 Tab; Refill: 0    4. Administrative encounter  - work release letter provided.     Total 40 minutes face-to-face time spent with patient, with greater than 50% of the total time discussing patient's issues and symptoms as listed above in assessment and plan, as well as managing coordination of care for future evaluation and  treatment.      Nakia Goldsmith M.D.      Followup: Return in about 4 weeks (around 1/23/2018).    Please note that this dictation was created using voice recognition software. I have made every reasonable attempt to correct obvious errors, but I expect that there are errors of grammar and possibly content that I did not discover before finalizing the note.

## 2018-01-02 ENCOUNTER — OFFICE VISIT (OUTPATIENT)
Dept: URGENT CARE | Facility: CLINIC | Age: 53
End: 2018-01-02
Payer: COMMERCIAL

## 2018-01-02 VITALS
OXYGEN SATURATION: 96 % | WEIGHT: 227 LBS | TEMPERATURE: 98 F | RESPIRATION RATE: 20 BRPM | SYSTOLIC BLOOD PRESSURE: 130 MMHG | BODY MASS INDEX: 40.22 KG/M2 | HEIGHT: 63 IN | HEART RATE: 100 BPM | DIASTOLIC BLOOD PRESSURE: 80 MMHG

## 2018-01-02 DIAGNOSIS — M54.40 ACUTE RIGHT-SIDED LOW BACK PAIN WITH SCIATICA, SCIATICA LATERALITY UNSPECIFIED: ICD-10-CM

## 2018-01-02 PROCEDURE — 99214 OFFICE O/P EST MOD 30 MIN: CPT | Performed by: NURSE PRACTITIONER

## 2018-01-02 RX ORDER — KETOROLAC TROMETHAMINE 30 MG/ML
30 INJECTION, SOLUTION INTRAMUSCULAR; INTRAVENOUS ONCE
Status: COMPLETED | OUTPATIENT
Start: 2018-01-02 | End: 2018-01-02

## 2018-01-02 RX ORDER — METAXALONE 800 MG/1
800 TABLET ORAL 3 TIMES DAILY
Qty: 30 TAB | Refills: 0 | Status: SHIPPED | OUTPATIENT
Start: 2018-01-02 | End: 2018-01-16 | Stop reason: SDUPTHER

## 2018-01-02 RX ADMIN — KETOROLAC TROMETHAMINE 30 MG: 30 INJECTION, SOLUTION INTRAMUSCULAR; INTRAVENOUS at 11:44

## 2018-01-02 ASSESSMENT — ENCOUNTER SYMPTOMS
BACK PAIN: 1
CHILLS: 0
FEVER: 0

## 2018-01-02 NOTE — PROGRESS NOTES
"Subjective:      Karine Ovalle is a 52 y.o. female who presents with Back Pain (Couple days ago lower back pop)    Past Medical History:   Diagnosis Date   • Arthritis     right knee   • Chronic back pain    • Dental disorder     partial upper   • Pain     left knee and arthritis pain     Social History     Social History   • Marital status: Single     Spouse name: N/A   • Number of children: N/A   • Years of education: N/A     Occupational History   • Not on file.     Social History Main Topics   • Smoking status: Never Smoker   • Smokeless tobacco: Never Used   • Alcohol use 2.0 oz/week     4 Glasses of wine per week      Comment: 4 drinks a week   • Drug use: No   • Sexual activity: No     Other Topics Concern   • Not on file     Social History Narrative    ** Merged History Encounter **          Family History   Problem Relation Age of Onset   • Diabetes Mother    • Heart Disease Mother        Allergies: Patient has no known allergies.    Patient is a 52-year-old female who presents today with complaint pain to the right hip. States a few days ago she fell on her right hip as she is moving presently. She has been lifting heavy boxes and states she tripped over something and fell directly on heparin. She has pain with weightbearing.            Back Pain   This is a chronic problem. The problem occurs constantly. The problem is unchanged. Pain location: low back, right hip. The quality of the pain is described as aching. The pain is moderate. Exacerbated by: walking. Stiffness is present all day. Pertinent negatives include no fever. She has tried nothing for the symptoms. The treatment provided no relief.       Review of Systems   Constitutional: Negative for chills and fever.   Musculoskeletal: Positive for back pain.        Right hip pain   All other systems reviewed and are negative.         Objective:     /80   Pulse 100   Temp 36.7 °C (98 °F)   Resp 20   Ht 1.6 m (5' 3\")   Wt 103 kg (227 lb) "   LMP 05/17/2017   SpO2 96%   BMI 40.21 kg/m²      Physical Exam   Constitutional: She is oriented to person, place, and time. She appears well-developed and well-nourished.   Musculoskeletal:        Legs:  No obvious deformity noted, patient is ambulatory   Point tenderness to the lateral aspect of the right hip.    Neurological: She is alert and oriented to person, place, and time.   Skin: Skin is warm and dry. Capillary refill takes less than 2 seconds.   Psychiatric: She has a normal mood and affect. Her behavior is normal. Judgment and thought content normal.   Vitals reviewed.    XR hip: pending, will call with results; patient states she may not get this x-ray done today.           Assessment/Plan:     1. Acute right-sided low back pain with sciatica, sciatica laterality unspecified    - ketorolac (TORADOL) injection 30 mg; 1 mL by Intramuscular route Once.  - DX-HIP-COMPLETE - UNILATERAL 2+ RIGHT; Future  -Follow up for persistent or worsening of symptoms.

## 2018-01-02 NOTE — PATIENT INSTRUCTIONS
Obesity  Obesity is defined as having too much total body fat and a body mass index (BMI) of 30 or more. BMI is an estimate of body fat and is calculated from your height and weight. BMI is typically calculated by your health care provider during regular wellness visits. Obesity happens when you consume more calories than you can burn by exercising or performing daily physical tasks. Prolonged obesity can cause major illnesses or emergencies, such as:  · Stroke.  · Heart disease.  · Diabetes.  · Cancer.  · Arthritis.  · High blood pressure (hypertension).  · High cholesterol.  · Sleep apnea.  · Erectile dysfunction.  · Infertility problems.  CAUSES   · Regularly eating unhealthy foods.  · Physical inactivity.  · Certain disorders, such as an underactive thyroid (hypothyroidism), Cushing's syndrome, and polycystic ovarian syndrome.  · Certain medicines, such as steroids, some depression medicines, and antipsychotics.  · Genetics.  · Lack of sleep.  DIAGNOSIS  A health care provider can diagnose obesity after calculating your BMI. Obesity will be diagnosed if your BMI is 30 or higher.  There are other methods of measuring obesity levels. Some other methods include measuring your skinfold thickness, your waist circumference, and comparing your hip circumference to your waist circumference.  TREATMENT   A healthy treatment program includes some or all of the following:  · Long-term dietary changes.  · Exercise and physical activity.  · Behavioral and lifestyle changes.  · Medicine only under the supervision of your health care provider. Medicines may help, but only if they are used with diet and exercise programs.  If your BMI is 40 or higher, your health care provider may recommend specialized surgery or programs to help with weight loss.  An unhealthy treatment program includes:  · Fasting.  · Fad diets.  · Supplements and drugs.  These choices do not succeed in long-term weight control.  HOME CARE  INSTRUCTIONS  · Exercise and perform physical activity as directed by your health care provider. To increase physical activity, try the following:  ¨ Use stairs instead of elevators.  ¨ Park farther away from store entrances.  ¨ Garden, bike, or walk instead of watching television or using the computer.  · Eat healthy, low-calorie foods and drinks on a regular basis. Eat more fruits and vegetables. Use low-calorie cookbooks or take healthy cooking classes.  · Limit fast food, sweets, and processed snack foods.  · Eat smaller portions.  · Keep a daily journal of everything you eat. There are many free websites to help you with this. It may be helpful to measure your foods so you can determine if you are eating the correct portion sizes.  · Avoid drinking alcohol. Drink more water and drinks without calories.  · Take vitamins and supplements only as recommended by your health care provider.  · Weight-loss support groups, registered dietitians, counselors, and stress reduction education can also be very helpful.  SEEK IMMEDIATE MEDICAL CARE IF:  · You have chest pain or tightness.  · You have trouble breathing or feel short of breath.  · You have weakness or leg numbness.  · You feel confused or have trouble talking.  · You have sudden changes in your vision.     This information is not intended to replace advice given to you by your health care provider. Make sure you discuss any questions you have with your health care provider.     Document Released: 01/25/2006 Document Revised: 01/08/2016 Document Reviewed: 01/23/2013  [employer] Interactive Patient Education ©2016 [employer]

## 2018-01-05 ENCOUNTER — OFFICE VISIT (OUTPATIENT)
Dept: URGENT CARE | Facility: CLINIC | Age: 53
End: 2018-01-05
Payer: COMMERCIAL

## 2018-01-05 VITALS
HEART RATE: 109 BPM | SYSTOLIC BLOOD PRESSURE: 140 MMHG | BODY MASS INDEX: 40.22 KG/M2 | OXYGEN SATURATION: 97 % | HEIGHT: 63 IN | RESPIRATION RATE: 17 BRPM | DIASTOLIC BLOOD PRESSURE: 90 MMHG | WEIGHT: 227 LBS | TEMPERATURE: 98.4 F

## 2018-01-05 DIAGNOSIS — M76.891 HIP FLEXOR TENDINITIS, RIGHT: ICD-10-CM

## 2018-01-05 PROCEDURE — 99214 OFFICE O/P EST MOD 30 MIN: CPT | Performed by: FAMILY MEDICINE

## 2018-01-05 RX ORDER — KETOROLAC TROMETHAMINE 30 MG/ML
60 INJECTION, SOLUTION INTRAMUSCULAR; INTRAVENOUS ONCE
Status: COMPLETED | OUTPATIENT
Start: 2018-01-05 | End: 2018-01-05

## 2018-01-05 RX ADMIN — KETOROLAC TROMETHAMINE 60 MG: 30 INJECTION, SOLUTION INTRAMUSCULAR; INTRAVENOUS at 11:54

## 2018-01-05 ASSESSMENT — ENCOUNTER SYMPTOMS
ORTHOPNEA: 0
CHILLS: 0
HEMOPTYSIS: 0
FOCAL WEAKNESS: 0
FEVER: 0
SHORTNESS OF BREATH: 0
DIZZINESS: 0

## 2018-01-05 NOTE — LETTER
January 5, 2018         Patient: Karine Ovalle   YOB: 1965   Date of Visit: 1/5/2018           To Whom it May Concern:    Karine Ovalle was seen in my clinic on 1/5/2018. She may return to work monday.    If you have any questions or concerns, please don't hesitate to call.        Sincerely,           Zander Bower M.D.  Electronically Signed

## 2018-01-05 NOTE — PROGRESS NOTES
Subjective:      Karine Ovalle is a 52 y.o. female who presents with Leg Pain (right side leg from hip radiating to leg below, thinks related to procedure done in Nov., hard to get into bed or car, seeing phisical therapy) and Bug Bite (right upper leg)    Chief Complaint   Patient presents with   • Leg Pain     right side leg from hip radiating to leg below, thinks related to procedure done in Nov., hard to get into bed or car, seeing phisical therapy   • Bug Bite     right upper leg        - This is a very pleasant 52 y.o. female with complaints of pain anterior Rt hip x 2 wks. No known injury. No NVFC. Worse w/ movement/position           ALLERGIES:  Patient has no known allergies.     PMH:  Past Medical History:   Diagnosis Date   • Arthritis     right knee   • Chronic back pain    • Dental disorder     partial upper   • Pain     left knee and arthritis pain        MEDS:    Current Outpatient Prescriptions:   •  oxycodone-acetaminophen (PERCOCET-10)  MG Tab, Take 1-2 Tabs by mouth every four hours as needed for Severe Pain., Disp: , Rfl:   •  morphine ER (MS CONTIN) 15 MG Tab CR tablet, Take 15 mg by mouth every 12 hours., Disp: , Rfl:   •  metaxalone (SKELAXIN) 800 MG Tab, Take 1 Tab by mouth 3 times a day., Disp: 30 Tab, Rfl: 0  •  cyclobenzaprine (FLEXERIL) 5 MG tablet, Take 1-2 Tabs by mouth 3 times a day as needed., Disp: 30 Tab, Rfl: 0  •  ibuprofen (MOTRIN) 800 MG Tab, Take 1 Tab by mouth every 8 hours as needed., Disp: 30 Tab, Rfl: 0  •  olopatadine (PATANOL) 0.1 % ophthalmic solution, Place 1 Drop in both eyes 2 times a day., Disp: 1 Bottle, Rfl: 0  •  furosemide (LASIX) 20 MG Tab, Take 1 Tab by mouth every day., Disp: 90 Tab, Rfl: 4  •  potassium chloride ER (KLOR-CON) 10 MEQ tablet, Take 1 Tab by mouth every day., Disp: 90 Tab, Rfl: 4  •  phentermine 37.5 MG capsule, Take 1 Cap by mouth every morning., Disp: 90 Cap, Rfl: 1  •  olopatadine (PATANOL) 0.1 % ophthalmic solution, Place 1 Drop  "in both eyes 2 times a day., Disp: 5 mL, Rfl: 11  •  oxycodone immediate-release (ROXICODONE) 5 MG Tab, Take 1 Tab by mouth every 8 hours as needed for Severe Pain., Disp: 90 Tab, Rfl: 0  •  cyanocobalamin (VITAMIN B12) 1000 MCG Tab, Take 1 Tab by mouth every day., Disp: 90 Tab, Rfl: 4  •  Lidocaine HCl 3 % Cream, Apply cream to affected area 2 times daily as needed, Disp: 1 Tube, Rfl: 0  •  Omega-3 Fatty Acids (FISH OIL) 1000 MG Cap capsule, Take 3,000 mg by mouth every day., Disp: , Rfl:   •  multivitamin (THERAGRAN) Tab, Take 1 Tab by mouth every day., Disp: , Rfl:   •  vitamin D (CHOLECALCIFEROL) 1000 UNIT Tab, Take 1,000 Units by mouth every day., Disp: , Rfl:     Current Facility-Administered Medications:   •  ketorolac (TORADOL) injection 60 mg, 60 mg, Intramuscular, Once, Zander Bower M.D.    ** I have documented what I find to be significant in regards to past medical, social, family and surgical history  in my HPI or under PMH/PSH/FH review section, otherwise it is contributory **           HPI    Review of Systems   Constitutional: Negative for chills and fever.   Respiratory: Negative for hemoptysis and shortness of breath.    Cardiovascular: Negative for chest pain and orthopnea.   Neurological: Negative for dizziness and focal weakness.          Objective:     /90   Pulse (!) 109   Temp 36.9 °C (98.4 °F)   Resp 17   Ht 1.6 m (5' 3\")   Wt 103 kg (227 lb)   LMP 05/17/2017   SpO2 97%   BMI 40.21 kg/m²      Physical Exam   Constitutional: She appears well-developed. No distress.   HENT:   Head: Normocephalic and atraumatic.   Mouth/Throat: Oropharynx is clear and moist.   Eyes: Conjunctivae are normal.   Neck: Neck supple.   Cardiovascular: Regular rhythm.    No murmur heard.  Pulmonary/Chest: Effort normal. No respiratory distress.   Neurological: She is alert. She exhibits normal muscle tone.   Skin: Skin is warm and dry.   Psychiatric: She has a normal mood and affect. Judgment " normal.   Nursing note and vitals reviewed.  Rt hip: + TTP and w/ stressing hip flexor insertion point. Good IR/ER.               Assessment/Plan:         1. Hip flexor tendinitis, right  ketorolac (TORADOL) injection 60 mg         - rest/heat/motrin  - cont PT  - f/u w/ pain specialist as planned    Dx & d/c instructions discussed w/ patient and/or family members. Follow up w/ Prvt Dr or here in 3-4 days if not getting better, sooner if needed,  ER if worse and UC/PCP unavailable.        Possible side effects (i.e. Rash, GI upset/constipation, sedation, elevation of BP or sugars) of any medications given discussed.

## 2018-01-09 ENCOUNTER — OFFICE VISIT (OUTPATIENT)
Dept: URGENT CARE | Facility: CLINIC | Age: 53
End: 2018-01-09
Payer: COMMERCIAL

## 2018-01-09 VITALS
OXYGEN SATURATION: 96 % | RESPIRATION RATE: 16 BRPM | BODY MASS INDEX: 40.75 KG/M2 | TEMPERATURE: 97.8 F | SYSTOLIC BLOOD PRESSURE: 138 MMHG | WEIGHT: 230 LBS | HEART RATE: 114 BPM | DIASTOLIC BLOOD PRESSURE: 88 MMHG | HEIGHT: 63 IN

## 2018-01-09 DIAGNOSIS — K30 INDIGESTION: ICD-10-CM

## 2018-01-09 DIAGNOSIS — S80.11XA CONTUSION OF RIGHT LOWER LEG, INITIAL ENCOUNTER: ICD-10-CM

## 2018-01-09 PROCEDURE — 99213 OFFICE O/P EST LOW 20 MIN: CPT | Performed by: EMERGENCY MEDICINE

## 2018-01-09 RX ORDER — ALUMINA, MAGNESIA, AND SIMETHICONE 2400; 2400; 240 MG/30ML; MG/30ML; MG/30ML
10 SUSPENSION ORAL ONCE
Status: COMPLETED | OUTPATIENT
Start: 2018-01-09 | End: 2018-01-09

## 2018-01-09 RX ADMIN — ALUMINA, MAGNESIA, AND SIMETHICONE 10 ML: 2400; 2400; 240 SUSPENSION ORAL at 19:24

## 2018-01-09 ASSESSMENT — ENCOUNTER SYMPTOMS
FOCAL WEAKNESS: 0
LOSS OF MOTION: 0
LOSS OF SENSATION: 0
TINGLING: 0
SENSORY CHANGE: 0
NUMBNESS: 0
FEVER: 0
INABILITY TO BEAR WEIGHT: 0

## 2018-01-09 NOTE — LETTER
January 9, 2018       Patient: Karine Ovalle   YOB: 1965   Date of Visit: 1/9/2018         To Whom It May Concern:    Karine Ovalle was not able to attend work this morning for medical reasons.    Sincerely,          Deangelo Russell M.D.  Electronically Signed

## 2018-01-09 NOTE — LETTER
January 9, 2018       Patient: Karine Ovalle   YOB: 1965   Date of Visit: 1/9/2018         To Whom It May Concern:    Karine Ovalle was not able to attend work today for medical reasons.    Sincerely,          Deangelo Russell M.D.  Electronically Signed

## 2018-01-10 ENCOUNTER — APPOINTMENT (OUTPATIENT)
Dept: MEDICAL GROUP | Age: 53
End: 2018-01-10
Payer: COMMERCIAL

## 2018-01-10 NOTE — PROGRESS NOTES
Subjective:      Karine Ovalle is a 52 y.o. female who presents with Leg Pain (right leg injury)            Leg Injury    Incident onset: yesterday. The incident occurred at home. The injury mechanism was a direct blow and a fall. The pain is present in the right leg. The pain has been improving since onset. Pertinent negatives include no inability to bear weight, loss of motion, loss of sensation, numbness or tingling. The symptoms are aggravated by palpation and weight bearing. She has tried rest for the symptoms. The treatment provided moderate relief.   States bumped right medial lower leg against concrete yesterday; noted swelling subsequently that is improved today. Denies additional injury.    Review of Systems   Constitutional: Negative for fever.   Musculoskeletal:        No pain proximal or distal to the site.   Skin: Negative for rash.   Neurological: Negative for tingling, sensory change, focal weakness and numbness.     PMH:  has a past medical history of Arthritis; Chronic back pain; Dental disorder; and Pain. She also has no past medical history of ASTHMA; Breast cancer (CMS-[facility]); or Diabetes.  MEDS:   Current Outpatient Prescriptions:   •  metaxalone (SKELAXIN) 800 MG Tab, Take 1 Tab by mouth 3 times a day., Disp: 30 Tab, Rfl: 0  •  cyclobenzaprine (FLEXERIL) 5 MG tablet, Take 1-2 Tabs by mouth 3 times a day as needed., Disp: 30 Tab, Rfl: 0  •  ibuprofen (MOTRIN) 800 MG Tab, Take 1 Tab by mouth every 8 hours as needed., Disp: 30 Tab, Rfl: 0  •  olopatadine (PATANOL) 0.1 % ophthalmic solution, Place 1 Drop in both eyes 2 times a day., Disp: 1 Bottle, Rfl: 0  •  furosemide (LASIX) 20 MG Tab, Take 1 Tab by mouth every day., Disp: 90 Tab, Rfl: 4  •  potassium chloride ER (KLOR-CON) 10 MEQ tablet, Take 1 Tab by mouth every day., Disp: 90 Tab, Rfl: 4  •  oxycodone-acetaminophen (PERCOCET-10)  MG Tab, Take 1-2 Tabs by mouth every four hours as needed for Severe Pain., Disp: , Rfl:   •  morphine  ER (MS CONTIN) 15 MG Tab CR tablet, Take 15 mg by mouth every 12 hours., Disp: , Rfl:   •  phentermine 37.5 MG capsule, Take 1 Cap by mouth every morning., Disp: 90 Cap, Rfl: 1  •  olopatadine (PATANOL) 0.1 % ophthalmic solution, Place 1 Drop in both eyes 2 times a day., Disp: 5 mL, Rfl: 11  •  oxycodone immediate-release (ROXICODONE) 5 MG Tab, Take 1 Tab by mouth every 8 hours as needed for Severe Pain., Disp: 90 Tab, Rfl: 0  •  cyanocobalamin (VITAMIN B12) 1000 MCG Tab, Take 1 Tab by mouth every day., Disp: 90 Tab, Rfl: 4  •  Lidocaine HCl 3 % Cream, Apply cream to affected area 2 times daily as needed, Disp: 1 Tube, Rfl: 0  •  Omega-3 Fatty Acids (FISH OIL) 1000 MG Cap capsule, Take 3,000 mg by mouth every day., Disp: , Rfl:   •  multivitamin (THERAGRAN) Tab, Take 1 Tab by mouth every day., Disp: , Rfl:   •  vitamin D (CHOLECALCIFEROL) 1000 UNIT Tab, Take 1,000 Units by mouth every day., Disp: , Rfl:     Current Facility-Administered Medications:   •  mag hydrox-al hydrox-simeth (MAALOX PLUS ES or MYLANTA DS) suspension 10 mL, 10 mL, Oral, Once, Deangelo Russell M.D.  ALLERGIES: No Known Allergies  SURGHX:   Past Surgical History:   Procedure Laterality Date   • KNEE ARTHROPLASTY TOTAL Right 10/20/2015    Procedure: KNEE ARTHROPLASTY TOTAL;  Surgeon: Bryon Levine M.D.;  Location: [facility];  Service:    • APPENDECTOMY LAPAROSCOPIC  3/21/2013    Performed by Dali Queen M.D. at [facility]   • DEBRIDEMENT  5/17/2012    Performed by BRADLEY DYKES at [facility]   • PLASTIC SURGERY  2004    excess skin on arms and legs removed   • PB ENLARGE BREAST WITH IMPLANT  2004   • GASTRIC BYPASS LAPAROSCOPIC  2002    [place]   • ABDOMINAL EXPLORATION       SOCHX:  reports that she has never smoked. She has never used smokeless tobacco. She reports that she drinks about 2.0 oz of alcohol per week . She reports that she does not use drugs.  FH: family history includes Diabetes  "in her mother; Heart Disease in her mother.       Objective:     /88   Pulse (!) 114   Temp 36.6 °C (97.8 °F)   Resp 16   Ht 1.6 m (5' 3\")   Wt 104.3 kg (230 lb)   LMP 05/17/2017   SpO2 96%   BMI 40.74 kg/m²      Physical Exam   Constitutional: She appears well-developed and well-nourished. She is cooperative.  Non-toxic appearance. No distress.   Cardiovascular:   Pulses:       Dorsalis pedis pulses are 1+ on the right side.   No significant pedal edema. Capillary refill is normal. No calf tenderness, Homans sign negative.   Pulmonary/Chest: Effort normal.   Musculoskeletal:        Right lower leg: She exhibits tenderness and edema. She exhibits no bony tenderness.        Legs:  Bilateral leg lymphedema noted; not asymmetric. Nontender right knee and ankle.   Lymphadenopathy:   No lymphangitis proximally.   Neurological: She is alert.   Skin: Skin is warm, dry and intact. No rash noted.   Psychiatric: Her behavior is normal.          After arrival requested dose of antacid due to nervous stomach. No chest or abdominal pain.     Assessment/Plan:     1. Contusion of right lower leg, initial encounter  Appears resolving.  Advised consider compression stockings; advise discuss with PCP/vascular specialist.  2. Indigestion  - mag hydrox-al hydrox-simeth (MAALOX PLUS ES or MYLANTA DS) suspension 10 mL; Take 10 mL by mouth Once.      "

## 2018-01-11 ENCOUNTER — APPOINTMENT (OUTPATIENT)
Dept: MEDICAL GROUP | Age: 53
End: 2018-01-11
Payer: COMMERCIAL

## 2018-01-16 ENCOUNTER — OFFICE VISIT (OUTPATIENT)
Dept: URGENT CARE | Facility: CLINIC | Age: 53
End: 2018-01-16
Payer: COMMERCIAL

## 2018-01-16 ENCOUNTER — HOSPITAL ENCOUNTER (OUTPATIENT)
Facility: MEDICAL CENTER | Age: 53
End: 2018-01-16
Attending: FAMILY MEDICINE
Payer: COMMERCIAL

## 2018-01-16 ENCOUNTER — OFFICE VISIT (OUTPATIENT)
Dept: MEDICAL GROUP | Age: 53
End: 2018-01-16
Payer: COMMERCIAL

## 2018-01-16 VITALS
HEIGHT: 63 IN | WEIGHT: 230 LBS | HEART RATE: 103 BPM | SYSTOLIC BLOOD PRESSURE: 136 MMHG | DIASTOLIC BLOOD PRESSURE: 88 MMHG | BODY MASS INDEX: 40.75 KG/M2 | OXYGEN SATURATION: 96 % | TEMPERATURE: 97.3 F

## 2018-01-16 VITALS
SYSTOLIC BLOOD PRESSURE: 130 MMHG | HEART RATE: 102 BPM | WEIGHT: 230 LBS | TEMPERATURE: 98 F | DIASTOLIC BLOOD PRESSURE: 85 MMHG | OXYGEN SATURATION: 96 % | RESPIRATION RATE: 22 BRPM | BODY MASS INDEX: 40.74 KG/M2

## 2018-01-16 DIAGNOSIS — M54.40 ACUTE RIGHT-SIDED LOW BACK PAIN WITH SCIATICA, SCIATICA LATERALITY UNSPECIFIED: ICD-10-CM

## 2018-01-16 DIAGNOSIS — M25.551 CHRONIC RIGHT HIP PAIN: ICD-10-CM

## 2018-01-16 DIAGNOSIS — G89.29 CHRONIC RIGHT HIP PAIN: ICD-10-CM

## 2018-01-16 DIAGNOSIS — R22.41 MASS OF RIGHT LOWER EXTREMITY: ICD-10-CM

## 2018-01-16 PROCEDURE — 99000 SPECIMEN HANDLING OFFICE-LAB: CPT | Performed by: FAMILY MEDICINE

## 2018-01-16 PROCEDURE — 11100 PR BIOPSY OF SKIN LESION: CPT | Performed by: FAMILY MEDICINE

## 2018-01-16 PROCEDURE — 99214 OFFICE O/P EST MOD 30 MIN: CPT | Mod: 25 | Performed by: FAMILY MEDICINE

## 2018-01-16 PROCEDURE — 99214 OFFICE O/P EST MOD 30 MIN: CPT | Mod: 25 | Performed by: PHYSICIAN ASSISTANT

## 2018-01-16 PROCEDURE — 88305 TISSUE EXAM BY PATHOLOGIST: CPT

## 2018-01-16 RX ORDER — KETOROLAC TROMETHAMINE 30 MG/ML
30 INJECTION, SOLUTION INTRAMUSCULAR; INTRAVENOUS ONCE
Status: COMPLETED | OUTPATIENT
Start: 2018-01-16 | End: 2018-01-16

## 2018-01-16 RX ORDER — METAXALONE 800 MG/1
800 TABLET ORAL 3 TIMES DAILY
Qty: 30 TAB | Refills: 0 | Status: SHIPPED | OUTPATIENT
Start: 2018-01-16 | End: 2018-02-05 | Stop reason: SDUPTHER

## 2018-01-16 RX ADMIN — KETOROLAC TROMETHAMINE 30 MG: 30 INJECTION, SOLUTION INTRAMUSCULAR; INTRAVENOUS at 19:25

## 2018-01-16 ASSESSMENT — ENCOUNTER SYMPTOMS
PALPITATIONS: 0
FOCAL WEAKNESS: 0
DIARRHEA: 0
COUGH: 0
FEVER: 0
NAUSEA: 0
CHILLS: 0
SHORTNESS OF BREATH: 0
SENSORY CHANGE: 0
ABDOMINAL PAIN: 0
TINGLING: 0
VOMITING: 0

## 2018-01-16 NOTE — LETTER
January 16, 2018         Patient: Karine Ovalle   YOB: 1965   Date of Visit: 1/16/2018           To Whom it May Concern:    Karine Ovalle was seen in my clinic on 1/16/2018. She may return to work on 1/17/18.    If you have any questions or concerns, please don't hesitate to call.        Sincerely,           Nakia Goldsmith M.D.  Electronically Signed

## 2018-01-16 NOTE — LETTER
January 16, 2018         Patient: Karine Ovalle   YOB: 1965   Date of Visit: 1/16/2018           To Whom it May Concern:    Karine Ovalle was seen in my clinic on 1/16/2018. She is excused from work on 1/11/18-1/12/18.  She may return to work on 1/17/18.    If you have any questions or concerns, please don't hesitate to call.        Sincerely,           Mindy Gagnon P.A.-C.  Electronically Signed

## 2018-01-17 ENCOUNTER — HOSPITAL ENCOUNTER (OUTPATIENT)
Facility: MEDICAL CENTER | Age: 53
End: 2018-01-17
Attending: FAMILY MEDICINE
Payer: COMMERCIAL

## 2018-01-17 NOTE — PROGRESS NOTES
Subjective:      Karine Ovalle is a 52 y.o. female who presents with Diarrhea (Couple days diarrhea) and Letter for School/Work            Patient is here today requesting a work note to excused her from missing work last week for Diarrhea. She states her symptoms have resolved. She also is here with complaints of chronic right hip pain.  She is established with pain management and receives chronic narcotics. She has been referred to PT and ortho. She is requesting a Toradol injection today. Her pain is the same from her usual pain.      Past Medical History:   Diagnosis Date   • Arthritis     right knee   • Chronic back pain    • Dental disorder     partial upper   • Pain     left knee and arthritis pain     Past Surgical History:   Procedure Laterality Date   • KNEE ARTHROPLASTY TOTAL Right 10/20/2015    Procedure: KNEE ARTHROPLASTY TOTAL;  Surgeon: Bryon Levine M.D.;  Location: SURGERY [facility];  Service:    • APPENDECTOMY LAPAROSCOPIC  3/21/2013    Performed by Dali Queen M.D. at SURGERY [facility]   • DEBRIDEMENT  5/17/2012    Performed by BRADLEY DYKES at SURGERY [facility]   • PLASTIC SURGERY  2004    excess skin on arms and legs removed   • PB ENLARGE BREAST WITH IMPLANT  2004   • GASTRIC BYPASS LAPAROSCOPIC  2002    [place]   • ABDOMINAL EXPLORATION         Family History   Problem Relation Age of Onset   • Diabetes Mother    • Heart Disease Mother        No Known Allergies    Medications, Allergies, and current problem list reviewed today in Epic      Review of Systems   Constitutional: Negative for chills, fever and malaise/fatigue.   Respiratory: Negative for cough and shortness of breath.    Cardiovascular: Negative for chest pain and palpitations.   Gastrointestinal: Negative for abdominal pain, diarrhea, nausea and vomiting.   Musculoskeletal: Positive for joint pain (chronic right hip pain ).   Neurological: Negative for tingling, sensory change and focal  weakness.   All other systems reviewed and are negative.        Objective:     /85   Pulse (!) 102   Temp 36.7 °C (98 °F)   Resp (!) 22   Wt 104.3 kg (230 lb)   LMP 05/17/2017   SpO2 96%   BMI 40.74 kg/m²      Physical Exam   Constitutional: She is oriented to person, place, and time. She appears well-developed and well-nourished. No distress.   Eyes: Conjunctivae are normal.   Cardiovascular: Normal rate, regular rhythm and normal heart sounds.  Exam reveals no gallop and no friction rub.    No murmur heard.  Pulmonary/Chest: Effort normal and breath sounds normal. No respiratory distress. She has no wheezes. She has no rales.   Musculoskeletal:        Right hip: She exhibits decreased range of motion and tenderness. She exhibits no swelling.   Lymphedema noted in lower extremities bilaterally    Neurological: She is alert and oriented to person, place, and time. No cranial nerve deficit.   Psychiatric: She has a normal mood and affect. Her behavior is normal. Judgment and thought content normal. Her speech is tangential.               Assessment/Plan:     1. Chronic right hip pain    - ketorolac (TORADOL) injection 30 mg; 1 mL by Intramuscular route Once.  Side effects and benefits of medication reviewed.       Work note given.  Advised follow-up with orthopedics- number given to patient regarding Ortho number to call.  Follow-up with pain management to discuss alternative.       Differential diagnoses, Supportive care, and indications for immediate follow-up discussed with patient.   Instructed to return to clinic or nearest emergency department for any change in condition, further concerns, or worsening of symptoms.    The patient demonstrated a good understanding and agreed with the treatment plan.    Mindy Gagnon P.A.-C.

## 2018-01-17 NOTE — PROGRESS NOTES
Subjective:   CC: leg mass    HPI:     Karine Ovalle is a 52 y.o. female, established patient of the clinic, presents with the following concerns:     1. Acute right-sided low back pain with sciatica, sciatica laterality unspecified  Pt has hx of chronic right-sided sciatica. She is taking Morphine ER, Percocet, and Roxicodone prescribed by pain management. She recently visited ER for worsening back pain. She was prescribed Metaxalone, which appears to help with muscle spasm. She denies any side effects with this medication and is interested in refill.     2. Mass of right lower extremity  Pt c/o tender, superficial mass at the right lateral calf for years. The mas does not enlarge, but appears to become more tender over time. She has consulted different doctors, but was told that the lesion was benign and not to worry about it. However, pt has significant concerns due to worsening tenderness, especially when she accidentally bumps the affected area.     Current medicines (including changes today)  Current Outpatient Prescriptions   Medication Sig Dispense Refill   • metaxalone (SKELAXIN) 800 MG Tab Take 1 Tab by mouth 3 times a day. 30 Tab 0   • ibuprofen (MOTRIN) 800 MG Tab Take 1 Tab by mouth every 8 hours as needed. 30 Tab 0   • olopatadine (PATANOL) 0.1 % ophthalmic solution Place 1 Drop in both eyes 2 times a day. 1 Bottle 0   • furosemide (LASIX) 20 MG Tab Take 1 Tab by mouth every day. 90 Tab 4   • potassium chloride ER (KLOR-CON) 10 MEQ tablet Take 1 Tab by mouth every day. 90 Tab 4   • oxycodone-acetaminophen (PERCOCET-10)  MG Tab Take 1-2 Tabs by mouth every four hours as needed for Severe Pain.     • morphine ER (MS CONTIN) 15 MG Tab CR tablet Take 15 mg by mouth every 12 hours.     • phentermine 37.5 MG capsule Take 1 Cap by mouth every morning. 90 Cap 1   • olopatadine (PATANOL) 0.1 % ophthalmic solution Place 1 Drop in both eyes 2 times a day. 5 mL 11   • oxycodone immediate-release  "(ROXICODONE) 5 MG Tab Take 1 Tab by mouth every 8 hours as needed for Severe Pain. 90 Tab 0   • cyanocobalamin (VITAMIN B12) 1000 MCG Tab Take 1 Tab by mouth every day. 90 Tab 4   • Lidocaine HCl 3 % Cream Apply cream to affected area 2 times daily as needed 1 Tube 0   • Omega-3 Fatty Acids (FISH OIL) 1000 MG Cap capsule Take 3,000 mg by mouth every day.     • multivitamin (THERAGRAN) Tab Take 1 Tab by mouth every day.     • vitamin D (CHOLECALCIFEROL) 1000 UNIT Tab Take 1,000 Units by mouth every day.       No current facility-administered medications for this visit.      She  has a past medical history of Arthritis; Chronic back pain; Dental disorder; and Pain. She also has no past medical history of ASTHMA; Breast cancer (CMS-HCC); or Diabetes.    I personally reviewed patient's problem list, allergies, medications, family hx, social hx with patient and update EPIC.     REVIEW OF SYSTEMS:  CONSTITUTIONAL:  Denies night sweats, fatigue, malaise, lethargy, fever or chills.  RESPIRATORY:  Denies cough, wheeze, hemoptysis, or shortness of breath.  CARDIOVASCULAR:  Denies chest pains, palpitations, pedal edema     Objective:     Blood pressure 136/88, pulse (!) 103, temperature 36.3 °C (97.3 °F), height 1.6 m (5' 3\"), weight 104.3 kg (230 lb), last menstrual period 05/17/2017, SpO2 96 %. Body mass index is 40.74 kg/m².    Physical Exam:  Constitutional: awake, alert, in no distress, morbidly obese.  Skin: Warm, dry, good turgor, no rashes, bruises, ulcers in visible areas.  -7x4 cm, soft, mobile, tender, superficial mass palpable at the right lateral calf. Negative erythema, rash, or discharge.   Eye: conjunctiva clear, lids neg for edema or lesions.  Neck: Trachea midline, no masses, no thyromegaly. No cervical or supraclavicular lymphadenopathy  Respiratory: Unlabored respiratory effort, lungs clear to auscultation, no wheezes, no rhonchi, no rales.  Cardiovascular: Normal S1, S2, no murmur, no pedal edema.  Psych: " Oriented x3, affect and mood wnl, intact judgement and insight.       Assessment and Plan:   The following treatment plan was discussed    1. Acute right-sided low back pain with sciatica, sciatica laterality unspecified  Chronic, on high dose pain meds prescribed by pain management. She was recently prescribed Metaxalone which appears to help with muscle spasm better than Flexeril. She denies any side effects with this medication.Plan:  - f/u with pain management.  - metaxalone (SKELAXIN) 800 MG Tab; Take 1 Tab by mouth 3 times a day.  Dispense: 30 Tab; Refill: 0  - f/u with PCP as directed.     2. Mass of right lower extremity  Chronic, tender, superficial, soft leg mass at the right lateral calf. Recommended biopsy. Pt consented to the procedure. Plan:   - punch biopsy.   - PATHOLOGY SPECIMEN; Future    Punch Biopsy Procedure Note  Preprocedure diagnosis: tender right leg mass  Postprocedure diagnosis: same    PARQ held and verbal consent obtained.  The site was cleansed with Povidone-Iodine sticks. Local anesthesia was achieved using lidocaine 2% without epinephrine.     Procedure Note:    0.6 cm punch biopsy of the right leg mass was obtained, and was closed with 3  interrupted nylon sutures.  The specimen was submitted to pathology.  Antibiotic ointment and Tegaderm applied to the wound, and aftercare instructions given to patient. Pt tolerates procedure well, no immediate complications noted.     Patient to return in 10 days for suture removal.    Nakia Goldsmith M.D.      Followup: Return in about 10 days (around 1/26/2018) for Suture removal.    Please note that this dictation was created using voice recognition software. I have made every reasonable attempt to correct obvious errors, but I expect that there are errors of grammar and possibly content that I did not discover before finalizing the note.

## 2018-01-22 ENCOUNTER — OFFICE VISIT (OUTPATIENT)
Dept: MEDICAL GROUP | Age: 53
End: 2018-01-22
Payer: COMMERCIAL

## 2018-01-22 VITALS
TEMPERATURE: 97.9 F | HEIGHT: 63 IN | HEART RATE: 83 BPM | SYSTOLIC BLOOD PRESSURE: 126 MMHG | DIASTOLIC BLOOD PRESSURE: 80 MMHG | BODY MASS INDEX: 40.57 KG/M2 | OXYGEN SATURATION: 95 % | WEIGHT: 229 LBS

## 2018-01-22 DIAGNOSIS — R53.81 DEBILITY: ICD-10-CM

## 2018-01-22 DIAGNOSIS — M16.11 PRIMARY OSTEOARTHRITIS OF RIGHT HIP: ICD-10-CM

## 2018-01-22 DIAGNOSIS — J45.20 MILD INTERMITTENT ASTHMA WITHOUT COMPLICATION: ICD-10-CM

## 2018-01-22 DIAGNOSIS — R22.42 MASS OF LEFT LOWER LEG: ICD-10-CM

## 2018-01-22 DIAGNOSIS — E66.01 SEVERE OBESITY (BMI >= 40) (HCC): ICD-10-CM

## 2018-01-22 DIAGNOSIS — Z12.11 SCREEN FOR COLON CANCER: ICD-10-CM

## 2018-01-22 PROCEDURE — 99214 OFFICE O/P EST MOD 30 MIN: CPT | Performed by: INTERNAL MEDICINE

## 2018-01-22 ASSESSMENT — ENCOUNTER SYMPTOMS
EYES NEGATIVE: 1
BACK PAIN: 1
RESPIRATORY NEGATIVE: 1
CARDIOVASCULAR NEGATIVE: 1
GASTROINTESTINAL NEGATIVE: 1
CONSTITUTIONAL NEGATIVE: 1
NEUROLOGICAL NEGATIVE: 1
PSYCHIATRIC NEGATIVE: 1

## 2018-01-22 NOTE — LETTER
January 22, 2018         Patient: Karine Ovalle   YOB: 1965   Date of Visit: 1/22/2018           To Whom it May Concern:    Karine Ovalle was seen in my clinic on 1/22/2018. She may return to work on 1/23/18.    If you have any questions or concerns, please don't hesitate to call.        Sincerely,           Micky Rubin M.D.  Electronically Signed

## 2018-01-23 NOTE — PROGRESS NOTES
Subjective:      Karine Ovalle is a 52 y.o. female who presents with Hip Pain (right hip pain)  AND  The patient is here for followup of chronic medical problems listed below. The patient is compliant with medications and having no side effects from them. Denies chest pain, abdominal pain, dyspnea, myalgias, or cough.   Patient Active Problem List    Diagnosis Date Noted   • Mild intermittent asthma without complication 04/25/2012     Priority: High   • Primary osteoarthritis of right hip- severe; need THR; REF ORTHO DR NOWAK 01/22/2018   • Mass of left lower leg- punch bx neg 1/18/2018 01/22/2018   • Lymphedema 10/17/2017   • Debility- FMLA through april 2018 10/12/2017   • Venous insufficiency 10/03/2017   • Colon cancer screening- needs 08/09/2017   • Chronic bilateral low back pain with bilateral sciatica- pain mgt, dr sanders 07/26/2017   • Chronic pain syndrome- nv adv pain, dr sanders 06/08/2017   • Chronic use of opiate for therapeutic purpose- nv adv pain dr sanders following 06/08/2017   • Obesity, morbid, BMI 40.0-49.9 (CMS-HCC) 10/21/2016   • Vitamin B 12 deficiency 06/02/2016   • Weight gain status post gastric bypass- in [place] 2002; was 320 lbs- got down to 155 08/12/2014   • Fibroids 08/13/2013   • Primary osteoarthritis of right knee- sp right TKR 2015 - dr nowak 08/13/2013     Patient has no known allergies.  Outpatient Medications Prior to Visit   Medication Sig Dispense Refill   • olopatadine (PATANOL) 0.1 % ophthalmic solution Place 1 Drop in both eyes 2 times a day. 1 Bottle 0   • oxycodone-acetaminophen (PERCOCET-10)  MG Tab Take 1-2 Tabs by mouth every four hours as needed for Severe Pain.     • cyanocobalamin (VITAMIN B12) 1000 MCG Tab Take 1 Tab by mouth every day. 90 Tab 4   • Lidocaine HCl 3 % Cream Apply cream to affected area 2 times daily as needed 1 Tube 0   • Omega-3 Fatty Acids (FISH OIL) 1000 MG Cap capsule Take 3,000 mg by mouth every day.     • multivitamin  "(THERAGRAN) Tab Take 1 Tab by mouth every day.     • vitamin D (CHOLECALCIFEROL) 1000 UNIT Tab Take 1,000 Units by mouth every day.     • metaxalone (SKELAXIN) 800 MG Tab Take 1 Tab by mouth 3 times a day. 30 Tab 0   • ibuprofen (MOTRIN) 800 MG Tab Take 1 Tab by mouth every 8 hours as needed. 30 Tab 0   • furosemide (LASIX) 20 MG Tab Take 1 Tab by mouth every day. 90 Tab 4   • potassium chloride ER (KLOR-CON) 10 MEQ tablet Take 1 Tab by mouth every day. 90 Tab 4   • morphine ER (MS CONTIN) 15 MG Tab CR tablet Take 15 mg by mouth every 12 hours.     • phentermine 37.5 MG capsule Take 1 Cap by mouth every morning. 90 Cap 1   • olopatadine (PATANOL) 0.1 % ophthalmic solution Place 1 Drop in both eyes 2 times a day. 5 mL 11   • oxycodone immediate-release (ROXICODONE) 5 MG Tab Take 1 Tab by mouth every 8 hours as needed for Severe Pain. 90 Tab 0     No facility-administered medications prior to visit.                HPI    Review of Systems   Constitutional: Negative.    HENT: Negative.    Eyes: Negative.    Respiratory: Negative.    Cardiovascular: Negative.    Gastrointestinal: Negative.    Genitourinary: Negative.    Musculoskeletal: Positive for back pain and joint pain.   Skin: Negative.    Neurological: Negative.    Endo/Heme/Allergies: Negative.    Psychiatric/Behavioral: Negative.           Objective:     /80   Pulse 83   Temp 36.6 °C (97.9 °F)   Ht 1.6 m (5' 2.99\")   Wt 103.9 kg (229 lb)   LMP 05/17/2017   SpO2 95%   BMI 40.58 kg/m²      Physical Exam   Constitutional: She is oriented to person, place, and time. She appears well-developed and well-nourished. No distress.   HENT:   Head: Normocephalic and atraumatic.   Right Ear: External ear normal.   Left Ear: External ear normal.   Nose: Nose normal.   Mouth/Throat: Oropharynx is clear and moist. No oropharyngeal exudate.   Eyes: Conjunctivae and EOM are normal. Pupils are equal, round, and reactive to light. Right eye exhibits no discharge. Left " eye exhibits no discharge. No scleral icterus.   Neck: Normal range of motion. Neck supple. No JVD present. No tracheal deviation present. No thyromegaly present.   Cardiovascular: Normal rate, regular rhythm, normal heart sounds and intact distal pulses.  Exam reveals no gallop and no friction rub.    No murmur heard.  Pulmonary/Chest: Effort normal and breath sounds normal. No stridor. No respiratory distress. She has no wheezes. She has no rales. She exhibits no tenderness.   Abdominal: Soft. Bowel sounds are normal. She exhibits no distension and no mass. There is no tenderness. There is no rebound and no guarding.   Musculoskeletal: Normal range of motion. She exhibits tenderness. She exhibits no edema.   Tenderness right hip with decreased range of motion   Lymphadenopathy:     She has no cervical adenopathy.   Neurological: She is alert and oriented to person, place, and time. She has normal reflexes. She displays normal reflexes. No cranial nerve deficit. She exhibits normal muscle tone. Coordination normal.   Skin: Skin is warm and dry. No rash noted. She is not diaphoretic. No erythema. No pallor.   Psychiatric: She has a normal mood and affect. Her behavior is normal. Judgment and thought content normal.   Vitals reviewed.    12/18/2017 5:23 PM    HISTORY/REASON FOR EXAM:  Bilateral hip pain.      TECHNIQUE/EXAM DESCRIPTION AND NUMBER OF VIEWS:  3 views of the bilateral hip.    COMPARISON: 4/24/2017    FINDINGS:  There is severe degenerative change of the right hip articulation characterized by extensive full-thickness cartilage loss with subchondral cystic change, osteophytic spurring and subchondral sclerosis. This is markedly progressed from comparison. There   is also progression of left hip degenerative change which is moderate to severe in nature. There is no evidence of fracture. There are surgical changes within the pelvis.   Impression       Severe degenerative change of the hip articulations  bilaterally, right greater than left. This has progressed from comparison.   Reading Provider Reading Date   Yoel Cherry M.D. Dec 18, 2017               Assessment/Plan:     1. Primary osteoarthritis of right hip        - REFERRAL TO ORTHOPEDICS    2. Severe obesity (BMI >= 40) (CMS-[facility])     diet/exercise/lose 15 lbs.; patient counseled      3. Mass of left lower leg- punch bx neg 1/18/2018    REMOVE SUTURES 4 DAYS    30 minute face-to-face encounter took place today.  More than half of this time was spent in the coordination of care of the above problems, as well as counseling.

## 2018-01-25 ENCOUNTER — OFFICE VISIT (OUTPATIENT)
Dept: MEDICAL GROUP | Age: 53
End: 2018-01-25
Payer: COMMERCIAL

## 2018-01-25 VITALS
TEMPERATURE: 97.9 F | WEIGHT: 218 LBS | DIASTOLIC BLOOD PRESSURE: 70 MMHG | BODY MASS INDEX: 38.62 KG/M2 | HEIGHT: 63 IN | OXYGEN SATURATION: 94 % | HEART RATE: 90 BPM | SYSTOLIC BLOOD PRESSURE: 130 MMHG

## 2018-01-25 DIAGNOSIS — K52.9 GASTROENTERITIS: ICD-10-CM

## 2018-01-25 DIAGNOSIS — Z51.89 VISIT FOR WOUND CHECK: ICD-10-CM

## 2018-01-25 PROCEDURE — 99213 OFFICE O/P EST LOW 20 MIN: CPT | Performed by: INTERNAL MEDICINE

## 2018-01-25 RX ORDER — OMEPRAZOLE 20 MG/1
20 CAPSULE, DELAYED RELEASE ORAL DAILY
Qty: 30 CAP | Refills: 0 | Status: SHIPPED | OUTPATIENT
Start: 2018-01-25 | End: 2018-12-04

## 2018-01-25 RX ORDER — ONDANSETRON 4 MG/1
4 TABLET, FILM COATED ORAL EVERY 6 HOURS PRN
Qty: 20 TAB | Refills: 0 | Status: SHIPPED | OUTPATIENT
Start: 2018-01-25 | End: 2018-01-26

## 2018-01-25 NOTE — ASSESSMENT & PLAN NOTE
Patient reports that she has 5 episodes of loose stool, nausea and vomiting yesterday. She reported fever, chills and feeling hot. But she denied abdominal pain or blood in stool. She tried to drink more water and eat crackles yesterday. She has not had nausea or vomiting or diarrhea since yesterday afternoon. She denied eating unusual food or eating outside before symptoms started.  Patient is able to tolerate oral intake and able to keep hydrated well. She was slightly tachycardia on initial vital signs. Rechecked vital signs after she sat for 10 minutes, pulse decreased to 90. She denied dizziness, palpitation or chest pain or shortness of breath.

## 2018-01-25 NOTE — ASSESSMENT & PLAN NOTE
Patient has tumor removed from right lower extremity by one of our provider in clinic on 1/16/18. The biopsy report was benign. Patient needs to remove suture today. She stated that she noticed yellowish discharge yesterday when she changed her dressing. . She denied pain or swelling on her wound. Her wound exam showed normal skin, and well-healed surgical scar with 3 suture stitches. I provided reassurance to patient that she does not have any infection in her wound.

## 2018-01-25 NOTE — LETTER
January 25, 2018         Patient: Karine Ovalle   YOB: 1965   Date of Visit: 1/25/2018           To Whom it May Concern:    Ms.Trina Ovalle has been sick on 1/24/18. She is seen in our clinic on 1/25/2018. Please excuse her from work yesterday and today.   If you have any questions or concerns, please don't hesitate to call.        Sincerely,           Shannan Rollins M.D.  Electronically Signed

## 2018-01-26 ENCOUNTER — OFFICE VISIT (OUTPATIENT)
Dept: MEDICAL GROUP | Age: 53
End: 2018-01-26
Payer: COMMERCIAL

## 2018-01-26 VITALS
DIASTOLIC BLOOD PRESSURE: 68 MMHG | WEIGHT: 219.2 LBS | HEART RATE: 100 BPM | HEIGHT: 63 IN | OXYGEN SATURATION: 95 % | TEMPERATURE: 97.8 F | BODY MASS INDEX: 38.84 KG/M2 | SYSTOLIC BLOOD PRESSURE: 124 MMHG

## 2018-01-26 DIAGNOSIS — K52.9 GASTROENTERITIS: ICD-10-CM

## 2018-01-26 DIAGNOSIS — T50.905A ADVERSE EFFECT OF DRUG, INITIAL ENCOUNTER: Primary | ICD-10-CM

## 2018-01-26 PROBLEM — Z51.89 VISIT FOR WOUND CHECK: Status: RESOLVED | Noted: 2018-01-25 | Resolved: 2018-01-26

## 2018-01-26 PROCEDURE — 99214 OFFICE O/P EST MOD 30 MIN: CPT | Performed by: FAMILY MEDICINE

## 2018-01-26 NOTE — LETTER
January 26, 2018        Re: Karine Ovalle    To whom it may concern,    My name is Nakia Goldsmith MD. I am taking care of Karine Ovalle, who is suffering acute illness that requires treatment. Please excuse Karine Ovalle from working duties on 01/26/18.     If you have any questions, please do not hesitate to call my office.     Best regards,                     Nakia Goldsmith

## 2018-01-27 NOTE — PROGRESS NOTES
Subjective:   CC: Adverse drug reaction    HPI:     Karine Ovalle is a 52 y.o. female, established patient of the clinic, presents with the following concerns:     Patient was seen yesterday by Dr. Rollins, who diagnosed her with viral gastroenteritis. Patient was prescribed Zofran for nausea. Patient states that she tries Zofran yesterday for nausea. However, she developed severe worsening nausea and vomiting after ingestion of Zofran. She has stopped taking the Zofran since. The nausea is improving today, however she continues to have non-bloody, watery diarrhea several times since this morning. She is not able to tolerate regular diet except for chicken broth. She has not tried any medication for diarrhea. She tries to drink lots of water. Denies fever, chills, abdominal pain, chest palpitations, shortness of breath, dyspnea on exertion. She feels very tired today and therefore she decided to stay home to rest.     Current medicines (including changes today)  Current Outpatient Prescriptions   Medication Sig Dispense Refill   • omeprazole (PRILOSEC) 20 MG delayed-release capsule Take 1 Cap by mouth every day. 30 Cap 0   • metaxalone (SKELAXIN) 800 MG Tab Take 1 Tab by mouth 3 times a day. 30 Tab 0   • ibuprofen (MOTRIN) 800 MG Tab Take 1 Tab by mouth every 8 hours as needed. 30 Tab 0   • furosemide (LASIX) 20 MG Tab Take 1 Tab by mouth every day. 90 Tab 4   • potassium chloride ER (KLOR-CON) 10 MEQ tablet Take 1 Tab by mouth every day. 90 Tab 4   • oxycodone-acetaminophen (PERCOCET-10)  MG Tab Take 1-2 Tabs by mouth every four hours as needed for Severe Pain.     • morphine ER (MS CONTIN) 15 MG Tab CR tablet Take 15 mg by mouth every 12 hours.     • phentermine 37.5 MG capsule Take 1 Cap by mouth every morning. 90 Cap 1   • olopatadine (PATANOL) 0.1 % ophthalmic solution Place 1 Drop in both eyes 2 times a day. 5 mL 11   • cyanocobalamin (VITAMIN B12) 1000 MCG Tab Take 1 Tab by mouth every day. 90 Tab 4  "  • Lidocaine HCl 3 % Cream Apply cream to affected area 2 times daily as needed 1 Tube 0   • Omega-3 Fatty Acids (FISH OIL) 1000 MG Cap capsule Take 3,000 mg by mouth every day.     • multivitamin (THERAGRAN) Tab Take 1 Tab by mouth every day.     • vitamin D (CHOLECALCIFEROL) 1000 UNIT Tab Take 1,000 Units by mouth every day.       No current facility-administered medications for this visit.      She  has a past medical history of Arthritis; Chronic back pain; Dental disorder; and Pain. She also has no past medical history of ASTHMA; Breast cancer (CMS-[facility]); or Diabetes.    I personally reviewed patient's problem list, allergies, medications, family hx, social hx with patient and update EPIC.     REVIEW OF SYSTEMS:  CONSTITUTIONAL:  Denies night sweats, fatigue, malaise, lethargy, fever or chills.  RESPIRATORY:  Denies cough, wheeze, hemoptysis, or shortness of breath.  CARDIOVASCULAR:  Denies chest pains, palpitations, pedal edema     Objective:     Blood pressure 124/68, pulse 100, temperature 36.6 °C (97.8 °F), height 1.6 m (5' 3\"), weight 99.4 kg (219 lb 3.2 oz), last menstrual period 05/17/2017, SpO2 95 %. Body mass index is 38.83 kg/m².    Physical Exam:  Constitutional: awake, alert, in no distress, obese.  Skin: Warm, dry, good turgor, no rashes, bruises, ulcers in visible areas.  Eye: conjunctiva clear, lids neg for edema or lesions.  ENMT: Oral mucosa is moist. Lips without lesions, good dentition, oropharynx clear.  Neck: Trachea midline, no masses, no thyromegaly. No cervical or supraclavicular lymphadenopathy  Respiratory: Unlabored respiratory effort, lungs clear to auscultation, no wheezes, no rhonchi, no rales.  Cardiovascular: Normal S1, S2, no murmur, no pedal edema.  Abdomen: Soft, non-tender to palpation, active BS, no hernia, no hepatosplenomegaly, negative rebound or guarding.   Psych: Oriented x3, affect and mood wnl, intact judgement and insight.       Assessment and Plan:   The following " treatment plan was discussed    1. Gastroenteritis  2. Adverse effect of drug, initial encounter  52-year-old female who presents with acute gastroenteritis. She tries Zofran prescribed to her yesterday. She states that she suffers worsening nausea and vomiting with Zofran. She has discontinued Zofran since yesterday. Nausea is improving today, however, she continues to suffer watery, nonbloody diarrhea. She is afebrile, hemodynamically stable, appears tired on exam, no signs or symptoms of dehydration on exam. Plans:  - Discontinue Zofran due to adverse drug reaction.  - Hydration, rest  - Dietary modification to prevent worsening of diarrhea  - Imodium when necessary for diarrhea.  - Strict ER precaution for symptoms of severe dehydration.  - Follow with PCP when necessary.  - Work letter provided.    Total 25 minutes face-to-face time spent with patient, with greater than 50% of the total time discussing patient's issues and symptoms as listed above in assessment and plan, as well as managing coordination of care for future evaluation and treatment.      Nakia Goldsmith M.D.      Followup: Return for As needed.    Please note that this dictation was created using voice recognition software. I have made every reasonable attempt to correct obvious errors, but I expect that there are errors of grammar and possibly content that I did not discover before finalizing the note.

## 2018-01-31 ENCOUNTER — OFFICE VISIT (OUTPATIENT)
Dept: MEDICAL GROUP | Age: 53
End: 2018-01-31
Payer: COMMERCIAL

## 2018-01-31 VITALS
DIASTOLIC BLOOD PRESSURE: 80 MMHG | TEMPERATURE: 98.1 F | SYSTOLIC BLOOD PRESSURE: 140 MMHG | HEART RATE: 88 BPM | HEIGHT: 63 IN | BODY MASS INDEX: 38.27 KG/M2 | WEIGHT: 216 LBS | OXYGEN SATURATION: 95 %

## 2018-01-31 DIAGNOSIS — G89.4 CHRONIC PAIN SYNDROME: ICD-10-CM

## 2018-01-31 DIAGNOSIS — Z12.11 SCREEN FOR COLON CANCER: ICD-10-CM

## 2018-01-31 DIAGNOSIS — G89.29 CHRONIC PAIN OF RIGHT HIP: ICD-10-CM

## 2018-01-31 DIAGNOSIS — M16.11 PRIMARY OSTEOARTHRITIS OF RIGHT HIP: ICD-10-CM

## 2018-01-31 DIAGNOSIS — E66.01 OBESITY, MORBID, BMI 40.0-49.9 (HCC): ICD-10-CM

## 2018-01-31 DIAGNOSIS — F11.20 UNCOMPLICATED OPIOID DEPENDENCE (HCC): ICD-10-CM

## 2018-01-31 DIAGNOSIS — Z79.891 CHRONIC USE OF OPIATE DRUGS THERAPEUTIC PURPOSES: ICD-10-CM

## 2018-01-31 DIAGNOSIS — M17.11 PRIMARY OSTEOARTHRITIS OF RIGHT KNEE: ICD-10-CM

## 2018-01-31 DIAGNOSIS — M25.551 CHRONIC PAIN OF RIGHT HIP: ICD-10-CM

## 2018-01-31 DIAGNOSIS — I10 ESSENTIAL HYPERTENSION: ICD-10-CM

## 2018-01-31 PROBLEM — M25.552 HIP PAIN, CHRONIC, LEFT: Status: ACTIVE | Noted: 2018-01-31

## 2018-01-31 PROBLEM — M25.552 HIP PAIN, CHRONIC, LEFT: Status: RESOLVED | Noted: 2018-01-31 | Resolved: 2018-01-31

## 2018-01-31 PROCEDURE — 99215 OFFICE O/P EST HI 40 MIN: CPT | Performed by: INTERNAL MEDICINE

## 2018-01-31 RX ORDER — MORPHINE SULFATE 30 MG/1
30 TABLET, FILM COATED, EXTENDED RELEASE ORAL EVERY 12 HOURS
Qty: 60 TAB | Refills: 0 | Status: SHIPPED | OUTPATIENT
Start: 2018-01-31 | End: 2018-02-28 | Stop reason: SDUPTHER

## 2018-01-31 RX ORDER — OXYCODONE AND ACETAMINOPHEN 10; 325 MG/1; MG/1
1 TABLET ORAL EVERY 8 HOURS PRN
Qty: 90 TAB | Refills: 0 | Status: SHIPPED | OUTPATIENT
Start: 2018-01-31 | End: 2018-02-28 | Stop reason: SDUPTHER

## 2018-01-31 RX ORDER — PREGABALIN 75 MG/1
75 CAPSULE ORAL 3 TIMES DAILY
Qty: 90 CAP | Refills: 11 | Status: SHIPPED | DISCHARGE
Start: 2018-01-31 | End: 2018-02-28

## 2018-01-31 RX ORDER — MORPHINE SULFATE 30 MG/1
30 TABLET, FILM COATED, EXTENDED RELEASE ORAL EVERY 12 HOURS
Qty: 60 TAB | Refills: 0 | Status: SHIPPED | OUTPATIENT
Start: 2018-01-31 | End: 2018-01-31 | Stop reason: SDUPTHER

## 2018-01-31 RX ORDER — PREGABALIN 75 MG/1
75 CAPSULE ORAL 3 TIMES DAILY
Qty: 90 CAP | Refills: 11 | Status: SHIPPED | DISCHARGE
Start: 2018-01-31 | End: 2018-01-31 | Stop reason: SDUPTHER

## 2018-01-31 ASSESSMENT — ENCOUNTER SYMPTOMS
GASTROINTESTINAL NEGATIVE: 1
RESPIRATORY NEGATIVE: 1
EYES NEGATIVE: 1
PSYCHIATRIC NEGATIVE: 1
CARDIOVASCULAR NEGATIVE: 1
MUSCULOSKELETAL NEGATIVE: 1
CONSTITUTIONAL NEGATIVE: 1
NEUROLOGICAL NEGATIVE: 1

## 2018-02-01 NOTE — PROGRESS NOTES
Subjective:      Karine Ovalle is a 52 y.o. female who presents with Hip Pain (would like to discuss)  Patient is here for follow-up of her chronic right hip pain which is due to severe DJD and for which she needs a total hip replacement. Probiotic surgeon Dr. Levine, who plans to do her surgery, but not until the patient has paid off her outstanding balance.     She is followed by pain management currently, Dr. Chung, who has the patient on OxyContin 15 mg, 45 a month, and Percocet 10/325, 75 a month. She is gone through both of these in the last 3 weeks since they were last prescribed 3 weeks ago. Refills are not due until 2/5/18. She brought in the prescriptions for these which she is to fill on that date.    Our patient wants to switch her pain management over our office. I did find that with manage her pain as long as we did not exceed reasonable levels which we discussed. I also plan she needs to get her hip surgery dialysis possible as this should alleviate a lot of her hip pain.    Patient is also on Lyrica 75 mg 3 times a day for which she gets samples from the pain management doctors office. She'll continue to get these from him and also see him for any other modalities for pain management such as epidurals if necessary in future.    .and  The patient is here for followup of chronic medical problems listed below. The patient is compliant with medications and having no side effects from them. Denies chest pain, abdominal pain, dyspnea, myalgias, or cough.   No new subjective & objective note has been filed under this hospital service since the last note was generated.    Zofran [dextrose-ondansetron]  Outpatient Medications Prior to Visit   Medication Sig Dispense Refill   • omeprazole (PRILOSEC) 20 MG delayed-release capsule Take 1 Cap by mouth every day. 30 Cap 0   • metaxalone (SKELAXIN) 800 MG Tab Take 1 Tab by mouth 3 times a day. 30 Tab 0   • ibuprofen (MOTRIN) 800 MG Tab Take 1 Tab by mouth  "every 8 hours as needed. 30 Tab 0   • furosemide (LASIX) 20 MG Tab Take 1 Tab by mouth every day. 90 Tab 4   • potassium chloride ER (KLOR-CON) 10 MEQ tablet Take 1 Tab by mouth every day. 90 Tab 4   • morphine ER (MS CONTIN) 15 MG Tab CR tablet Take 15 mg by mouth every 12 hours.     • phentermine 37.5 MG capsule Take 1 Cap by mouth every morning. 90 Cap 1   • olopatadine (PATANOL) 0.1 % ophthalmic solution Place 1 Drop in both eyes 2 times a day. 5 mL 11   • cyanocobalamin (VITAMIN B12) 1000 MCG Tab Take 1 Tab by mouth every day. 90 Tab 4   • Lidocaine HCl 3 % Cream Apply cream to affected area 2 times daily as needed 1 Tube 0   • Omega-3 Fatty Acids (FISH OIL) 1000 MG Cap capsule Take 3,000 mg by mouth every day.     • multivitamin (THERAGRAN) Tab Take 1 Tab by mouth every day.     • vitamin D (CHOLECALCIFEROL) 1000 UNIT Tab Take 1,000 Units by mouth every day.     • oxycodone-acetaminophen (PERCOCET-10)  MG Tab Take 1-2 Tabs by mouth every four hours as needed for Severe Pain.       No facility-administered medications prior to visit.                    do      HPI    Review of Systems   Constitutional: Negative.    HENT: Negative.    Eyes: Negative.    Respiratory: Negative.    Cardiovascular: Negative.    Gastrointestinal: Negative.    Genitourinary: Negative.    Musculoskeletal: Negative.    Skin: Negative.    Neurological: Negative.    Endo/Heme/Allergies: Negative.    Psychiatric/Behavioral: Negative.           Objective:     BP (!) 216/80   Pulse 88   Temp 36.7 °C (98.1 °F)   Ht 1.6 m (5' 2.99\")   Wt 98 kg (216 lb)   LMP 05/17/2017   SpO2 95%   BMI 38.27 kg/m²    Bp alvina 160/80  Physical Exam   Constitutional: She is oriented to person, place, and time. She appears well-developed and well-nourished. No distress.   HENT:   Head: Normocephalic and atraumatic.   Right Ear: External ear normal.   Left Ear: External ear normal.   Nose: Nose normal.   Mouth/Throat: Oropharynx is clear and moist. No " oropharyngeal exudate.   Eyes: Conjunctivae and EOM are normal. Pupils are equal, round, and reactive to light. Right eye exhibits no discharge. Left eye exhibits no discharge. No scleral icterus.   Neck: Normal range of motion. Neck supple. No JVD present. No tracheal deviation present. No thyromegaly present.   Cardiovascular: Normal rate, regular rhythm, normal heart sounds and intact distal pulses.  Exam reveals no gallop and no friction rub.    No murmur heard.  Pulmonary/Chest: Effort normal and breath sounds normal. No stridor. No respiratory distress. She has no wheezes. She has no rales. She exhibits no tenderness.   Abdominal: Soft. Bowel sounds are normal. She exhibits no distension and no mass. There is no tenderness. There is no rebound and no guarding.   Musculoskeletal: Normal range of motion. She exhibits no edema or tenderness.   Lymphadenopathy:     She has no cervical adenopathy.   Neurological: She is alert and oriented to person, place, and time. She has normal reflexes. She displays normal reflexes. No cranial nerve deficit. She exhibits normal muscle tone. Coordination normal.   Skin: Skin is warm and dry. No rash noted. She is not diaphoretic. No erythema. No pallor.   Psychiatric: She has a normal mood and affect. Her behavior is normal. Judgment and thought content normal.   Vitals reviewed.    No visits with results within 1 Month(s) from this visit.   Latest known visit with results is:   Hospital Outpatient Visit on 12/14/2017   Component Date Value   • B Natriuretic Peptide 12/14/2017 13       Lab Results   Component Value Date/Time    HBA1C 5.3 06/12/2017 09:03 AM     Lab Results   Component Value Date/Time    SODIUM 134 (L) 06/12/2017 09:02 AM    POTASSIUM 3.9 06/12/2017 09:02 AM    CHLORIDE 102 06/12/2017 09:02 AM    CO2 26 06/12/2017 09:02 AM    GLUCOSE 96 06/12/2017 09:02 AM    BUN 11 06/12/2017 09:02 AM    CREATININE 0.75 06/12/2017 09:02 AM    CREATININE 0.88 08/14/2010 12:00 AM     BUNCREATRAT 13 08/14/2010 12:00 AM    GLOMRATE >59 08/14/2010 12:00 AM    ALKPHOSPHAT 100 (H) 06/12/2017 09:02 AM    ASTSGOT 18 06/12/2017 09:02 AM    ALTSGPT 10 06/12/2017 09:02 AM    TBILIRUBIN 0.7 06/12/2017 09:02 AM     Lab Results   Component Value Date/Time    INR 1.04 05/18/2012 12:20 PM     Lab Results   Component Value Date/Time    CHOLSTRLTOT 144 06/12/2017 09:02 AM    LDL 63 06/12/2017 09:02 AM    HDL 69 06/12/2017 09:02 AM    TRIGLYCERIDE 60 06/12/2017 09:02 AM       No results found for: TESTOSTERONE  Lab Results   Component Value Date/Time    TSH 2.110 08/14/2010 12:00 AM     Lab Results   Component Value Date/Time    FREET4 0.72 05/03/2016 07:32 AM     Lab Results   Component Value Date/Time    URICACID 5.6 08/12/2014 04:49 PM     No components found for: VITB12  Lab Results   Component Value Date/Time    25HYDROXY 33 08/12/2014 04:49 PM    25HYDROXY 25 (L) 07/03/2012 09:20 AM     '            Assessment/Plan:     1. Primary osteoarthritis of right hip- severe; need THR; REF ORTHO DR JERRY            I agreed to take over the patient's pain medication management. In addition we will increase her Percocet 10/325 for breakthrough pain up to 3 times a day, and increase her MS Contin to 30 mg every 12 hours. One month supply of each with no refills. Continue with monthly follow-up appointments refill these.    Lyrica 75 mg 3 times a day will be prescribed by her current pain management doctor since she cannot get coupons or samples from us. However he will not prescribe any opiates. The patient will return her current prescriptions for the MS Contin and Percocet 10/3/25 that were written (2 days ago by dr sanders that were to be filled on 2/6/18) to that physician's office.        - oxyCODONE-acetaminophen (PERCOCET-10)  MG Tab; Take 1 Tab by mouth every 8 hours as needed for Severe Pain for up to 28 days.  Dispense: 90 Tab; Refill: 0  - CONSENT FOR OPIATE PRESCRIPTION  - morphine ER (MS CONTIN)  30 MG Tab CR tablet; Take 1 Tab by mouth every 12 hours for 28 days.  Dispense: 60 Tab; Refill: 0      - pregabalin (LYRICA) 75 MG Cap; Take 1 Cap by mouth 3 times a day for 28 days.  Dispense: 90 Cap; Refill: 11 to be filled by another facility.,     2. Uncomplicated opioid dependence (CMS-HCC)     - oxyCODONE-acetaminophen (PERCOCET-10)  MG Tab; Take 1 Tab by mouth every 8 hours as needed for Severe Pain for up to 28 days.  Dispense: 90 Tab; Refill: 0  - CONSENT FOR OPIATE PRESCRIPTION  - morphine ER (MS CONTIN) 30 MG Tab CR tablet; Take 1 Tab by mouth every 12 hours for 28 days.  Dispense: 60 Tab; Refill: 0    3. Chronic pain of right hip     - oxyCODONE-acetaminophen (PERCOCET-10)  MG Tab; Take 1 Tab by mouth every 8 hours as needed for Severe Pain for up to 28 days.  Dispense: 90 Tab; Refill: 0  - CONSENT FOR OPIATE PRESCRIPTION  - morphine ER (MS CONTIN) 30 MG Tab CR tablet; Take 1 Tab by mouth every 12 hours for 28 days.  Dispense: 60 Tab; Refill: 0  -   4. Primary osteoarthritis of right knee- sp right TKR 2015 - dr nowak     - oxyCODONE-acetaminophen (PERCOCET-10)  MG Tab; Take 1 Tab by mouth every 8 hours as needed for Severe Pain for up to 28 days.  Dispense: 90 Tab; Refill: 0  - CONSENT FOR OPIATE PRESCRIPTION  - morphine ER (MS CONTIN) 30 MG Tab CR tablet; Take 1 Tab by mouth every 12 hours for 28 days.  Dispense: 60 Tab; Refill: 0  -     5. Chronic pain syndrome- [place] adv pain, dr sanders     - oxyCODONE-acetaminophen (PERCOCET-10)  MG Tab; Take 1 Tab by mouth every 8 hours as needed for Severe Pain for up to 28 days.  Dispense: 90 Tab; Refill: 0  - CONSENT FOR OPIATE PRESCRIPTION  - morphine ER (MS CONTIN) 30 MG Tab CR tablet; Take 1 Tab by mouth every 12 hours for 28 days.  Dispense: 60 Tab; Refill: 0  -     6. Obesity, morbid, BMI 40.0-49.9 (CMS-HCC)    diet/exercise/lose 15 lbs.; patient counseled      7. Chronic use of opiate drugs therapeutic purposes   as  above  8. Screen for colon cancer      - OCCULT BLOOD FECES IMMUNOASSAY (FIT); Future      40 minute face-to-face encounter took place today.  More than half of this time was spent in the coordination of care of the above problems, as well as counseling.

## 2018-02-02 ENCOUNTER — TELEPHONE (OUTPATIENT)
Dept: MEDICAL GROUP | Age: 53
End: 2018-02-02

## 2018-02-02 NOTE — TELEPHONE ENCOUNTER
ESTABLISHED PATIENT PRE-VISIT PLANNING     Note: Patient will not be contacted if there is no indication to call.     1.  Reviewed notes from the last few office visits within the medical group: Yes    2.  If any orders were placed at last visit or intended to be done for this visit (i.e. 6 mos follow-up), do we have Results/Consult Notes?        •  Labs - Labs were not ordered at last office visit.   Note: If patient appointment is for lab review and patient did not complete labs, check with provider if OK to reschedule patient until labs completed.       •  Imaging - Imaging ordered, NOT completed. Patient advised to complete prior to next appointment.       •  Referrals - Referral ordered, patient has NOT been seen.    3. Is this appointment scheduled as a Hospital Follow-Up? No    4.  Immunizations were updated in Epic using WebIZ?: Epic matches WebIZ       •  Web Iz Recommendations:     5.  Patient is due for the following Health Maintenance Topics:   Health Maintenance Due   Topic Date Due   • IMM PNEUMOCOCCAL 19-64 (ADULT) MEDIUM RISK SERIES (1 of 1 - PPSV23) 05/17/1984   • COLON CANCER SCREENING ANNUAL FIT  05/17/2015   • PAP SMEAR  07/09/2016           6.  Patient was NOT informed to arrive 15 min prior to their scheduled appointment and bring in their medication bottles.

## 2018-02-05 ENCOUNTER — OFFICE VISIT (OUTPATIENT)
Dept: MEDICAL GROUP | Age: 53
End: 2018-02-05
Payer: COMMERCIAL

## 2018-02-05 VITALS
DIASTOLIC BLOOD PRESSURE: 76 MMHG | SYSTOLIC BLOOD PRESSURE: 120 MMHG | OXYGEN SATURATION: 95 % | HEIGHT: 63 IN | BODY MASS INDEX: 39.62 KG/M2 | HEART RATE: 100 BPM | TEMPERATURE: 97.7 F | WEIGHT: 223.6 LBS

## 2018-02-05 DIAGNOSIS — R53.81 DEBILITY: ICD-10-CM

## 2018-02-05 DIAGNOSIS — E66.01 OBESITY, MORBID, BMI 40.0-49.9 (HCC): ICD-10-CM

## 2018-02-05 DIAGNOSIS — M54.42 CHRONIC BILATERAL LOW BACK PAIN WITH BILATERAL SCIATICA: ICD-10-CM

## 2018-02-05 DIAGNOSIS — Z23 NEED FOR VACCINATION: ICD-10-CM

## 2018-02-05 DIAGNOSIS — I10 ESSENTIAL HYPERTENSION: ICD-10-CM

## 2018-02-05 DIAGNOSIS — R26.2 DISABILITY OF WALKING: ICD-10-CM

## 2018-02-05 DIAGNOSIS — M54.41 CHRONIC BILATERAL LOW BACK PAIN WITH BILATERAL SCIATICA: ICD-10-CM

## 2018-02-05 DIAGNOSIS — R26.2 INABILITY TO AMBULATE DUE TO HIP: ICD-10-CM

## 2018-02-05 DIAGNOSIS — F11.20 UNCOMPLICATED OPIOID DEPENDENCE (HCC): ICD-10-CM

## 2018-02-05 DIAGNOSIS — M54.40 ACUTE RIGHT-SIDED LOW BACK PAIN WITH SCIATICA, SCIATICA LATERALITY UNSPECIFIED: ICD-10-CM

## 2018-02-05 DIAGNOSIS — Z12.11 SCREEN FOR COLON CANCER: ICD-10-CM

## 2018-02-05 DIAGNOSIS — M16.11 PRIMARY OSTEOARTHRITIS OF RIGHT HIP: ICD-10-CM

## 2018-02-05 DIAGNOSIS — G89.29 CHRONIC BILATERAL LOW BACK PAIN WITH BILATERAL SCIATICA: ICD-10-CM

## 2018-02-05 PROBLEM — M25.551 CHRONIC PAIN OF RIGHT HIP: Status: RESOLVED | Noted: 2018-01-31 | Resolved: 2018-02-05

## 2018-02-05 PROBLEM — Z79.891 CHRONIC USE OF OPIATE DRUGS THERAPEUTIC PURPOSES: Status: RESOLVED | Noted: 2018-01-31 | Resolved: 2018-02-05

## 2018-02-05 PROBLEM — Z79.891 CHRONIC USE OF OPIATE FOR THERAPEUTIC PURPOSE: Status: RESOLVED | Noted: 2017-06-08 | Resolved: 2018-02-05

## 2018-02-05 PROBLEM — G89.4 CHRONIC PAIN SYNDROME: Status: RESOLVED | Noted: 2017-06-08 | Resolved: 2018-02-05

## 2018-02-05 PROCEDURE — 99215 OFFICE O/P EST HI 40 MIN: CPT | Mod: 25 | Performed by: INTERNAL MEDICINE

## 2018-02-05 PROCEDURE — 90732 PPSV23 VACC 2 YRS+ SUBQ/IM: CPT | Performed by: INTERNAL MEDICINE

## 2018-02-05 PROCEDURE — 90471 IMMUNIZATION ADMIN: CPT | Performed by: INTERNAL MEDICINE

## 2018-02-05 RX ORDER — METAXALONE 800 MG/1
800 TABLET ORAL 3 TIMES DAILY
Qty: 90 TAB | Refills: 4 | Status: SHIPPED | OUTPATIENT
Start: 2018-02-05 | End: 2018-11-14 | Stop reason: SDUPTHER

## 2018-02-05 ASSESSMENT — ENCOUNTER SYMPTOMS
RESPIRATORY NEGATIVE: 1
EYES NEGATIVE: 1
GASTROINTESTINAL NEGATIVE: 1
CARDIOVASCULAR NEGATIVE: 1
PSYCHIATRIC NEGATIVE: 1
FOCAL WEAKNESS: 1
SENSORY CHANGE: 1
BACK PAIN: 1
WEAKNESS: 1

## 2018-02-05 NOTE — PROGRESS NOTES
"Subjective:      Karine Ovalle is a 52 y.o. female who presents with Hip Pain (Bilateral FMLA paperwork)  The patient presents for recertification of her FMLA paperwork, but she now states she is unable to work at all. Previously she's been limited to 2-3 days a week for 6 hours at a time. She now realizes that she cannot work effectively at any limited amount. She requests full and complete disability/the next 6-12 months. Because of severe bilateral hip pain and her persistent low back pain. X-rays have shown severe DJD of both hips worse in the right. Also severe DDD of the L-spine. No lumbar surgeries can't plan at this point. She has required epidurals apparently.    She now requires use of walker and admits very slowly only. Short steps at a time and has to stop to rest frequently. She is unable to ambulate with a walker more than 50 feet without having to stop. It is extremely painful for her to stand up from a chair sitting position. The symptoms progress since April 2017.          HPI    Review of Systems   HENT: Negative.    Eyes: Negative.    Respiratory: Negative.    Cardiovascular: Negative.    Gastrointestinal: Negative.    Genitourinary: Negative.    Musculoskeletal: Positive for back pain and joint pain.   Skin: Negative.    Neurological: Positive for sensory change, focal weakness and weakness.   Endo/Heme/Allergies: Negative.    Psychiatric/Behavioral: Negative.           Objective:     /76   Pulse 100   Temp 36.5 °C (97.7 °F)   Ht 1.6 m (5' 3\")   Wt 101.4 kg (223 lb 9.6 oz)   LMP 05/17/2017   SpO2 95%   BMI 39.61 kg/m²      Physical Exam   Constitutional: She is oriented to person, place, and time. She appears well-developed and well-nourished. No distress.   HENT:   Head: Normocephalic and atraumatic.   Right Ear: External ear normal.   Left Ear: External ear normal.   Nose: Nose normal.   Mouth/Throat: Oropharynx is clear and moist. No oropharyngeal exudate.   Eyes: " Conjunctivae and EOM are normal. Pupils are equal, round, and reactive to light. Right eye exhibits no discharge. Left eye exhibits no discharge. No scleral icterus.   Neck: Normal range of motion. Neck supple. No JVD present. No tracheal deviation present. No thyromegaly present.   Cardiovascular: Normal rate, regular rhythm, normal heart sounds and intact distal pulses.  Exam reveals no gallop and no friction rub.    No murmur heard.  Pulmonary/Chest: Effort normal and breath sounds normal. No stridor. No respiratory distress. She has no wheezes. She has no rales. She exhibits no tenderness.   Abdominal: Soft. Bowel sounds are normal. She exhibits no distension and no mass. There is no tenderness. There is no rebound and no guarding.   Musculoskeletal: Normal range of motion. She exhibits tenderness. She exhibits no edema.   Bilateral hip tenderness with diminished range of motion in both hips. Severe tenderness of the LS spine and paraspinous areas. Pain elicited at 30° and straight leg raising bilaterally.   Lymphadenopathy:     She has no cervical adenopathy.   Neurological: She is alert and oriented to person, place, and time. She has normal reflexes. She displays normal reflexes. No cranial nerve deficit. She exhibits normal muscle tone. Coordination normal.   Skin: Skin is warm and dry. No rash noted. She is not diaphoretic. No erythema. No pallor.   Psychiatric: She has a normal mood and affect. Her behavior is normal. Judgment and thought content normal.   Vitals reviewed.    No visits with results within 1 Month(s) from this visit.   Latest known visit with results is:   Hospital Outpatient Visit on 12/14/2017   Component Date Value   • B Natriuretic Peptide 12/14/2017 13       Lab Results   Component Value Date/Time    HBA1C 5.3 06/12/2017 09:03 AM     Lab Results   Component Value Date/Time    SODIUM 134 (L) 06/12/2017 09:02 AM    POTASSIUM 3.9 06/12/2017 09:02 AM    CHLORIDE 102 06/12/2017 09:02 AM     CO2 26 06/12/2017 09:02 AM    GLUCOSE 96 06/12/2017 09:02 AM    BUN 11 06/12/2017 09:02 AM    CREATININE 0.75 06/12/2017 09:02 AM    CREATININE 0.88 08/14/2010 12:00 AM    BUNCREATRAT 13 08/14/2010 12:00 AM    GLOMRATE >59 08/14/2010 12:00 AM    ALKPHOSPHAT 100 (H) 06/12/2017 09:02 AM    ASTSGOT 18 06/12/2017 09:02 AM    ALTSGPT 10 06/12/2017 09:02 AM    TBILIRUBIN 0.7 06/12/2017 09:02 AM     Lab Results   Component Value Date/Time    INR 1.04 05/18/2012 12:20 PM     Lab Results   Component Value Date/Time    CHOLSTRLTOT 144 06/12/2017 09:02 AM    LDL 63 06/12/2017 09:02 AM    HDL 69 06/12/2017 09:02 AM    TRIGLYCERIDE 60 06/12/2017 09:02 AM       No results found for: TESTOSTERONE  Lab Results   Component Value Date/Time    TSH 2.110 08/14/2010 12:00 AM     Lab Results   Component Value Date/Time    FREET4 0.72 05/03/2016 07:32 AM     Lab Results   Component Value Date/Time    URICACID 5.6 08/12/2014 04:49 PM     No components found for: VITB12  Lab Results   Component Value Date/Time    25HYDROXY 33 08/12/2014 04:49 PM    25HYDROXY 25 (L) 07/03/2012 09:20 AM                 Assessment/Plan:     1. Disability of walking- due to severe bilateral hip DJD- needs bilat THR- placed on full disability 2/5/18-2/5/19         In my opinion, the patient is completely totally disabled from any gainful employment for next 12 months. This is due to her severe DJD of both hips. She may recover from bilateral hip replacement to the point where she could return to work full time after a period of rehabilitation. Referral to orthopedic surgeon is pending. I would expect this process to take up to one year. Is further complicated by her severe DJD of the LS spine. This will require continued follow-up and periodic epidurals and reassessment of 70 for any lumbar surgery as a time.       - Misc. Devices Misc; Front wheeled walker with attached seeat  Dispense: 1 Each; Refill: 0    2. Debility- FMLA through april 2018-see above.  Paperwork filled out for FMLA indicating patient is disabled to the point where she is not able to work any hours or days per week for the next 4 months. See above     - Misc. Devices Misc; Front wheeled walker with attached seeat  Dispense: 1 Each; Refill: 0    3. Primary osteoarthritis of right hip- severe; need THR; REF ORTHO DR JERRY   As above  - Misc. Devices Misc; Front wheeled walker with attached seeat  Dispense: 1 Each; Refill: 0    4. Inability to ambulate due to hip   As above as above  - Misc. Devices Misc; Front wheeled walker with attached seeat  Dispense: 1 Each; Refill: 0    5. Chronic bilateral low back pain with bilateral sciatica- pain mgt, dr sanders   As above  - Misc. Devices Misc; Front wheeled walker with attached seeat  Dispense: 1 Each; Refill: 0    6. Need for vaccination     - Pneumococal Polysaccharide Vaccine 23-Valent =>3yo SQ/IM    7. Obesity, morbid, BMI 40.0-49.9 (CMS-HCC)    High-protein low-carb diet and calorie restriction 1200 donovan a day.    8. Essential hypertension   Under good control. Continue same regimen.'      9. Chronic use of opiate for therapeutic purpose- [place] adv pain dr sanders following   Under good control. Continue same regimen.  Recheck 3 weeks for refills    10. Uncomplicated opioid dependence (CMS-HCC)- pcp dr hackett to rx   Under good control. Continue same regimen.       40 minute face-to-face encounter took place today.  More than half of this time was spent in the coordination of care of the above problems, as well as counseling.

## 2018-02-13 ENCOUNTER — TELEPHONE (OUTPATIENT)
Dept: MEDICAL GROUP | Age: 53
End: 2018-02-13

## 2018-02-13 ENCOUNTER — APPOINTMENT (OUTPATIENT)
Dept: MEDICAL GROUP | Age: 53
End: 2018-02-13
Payer: COMMERCIAL

## 2018-02-13 ENCOUNTER — APPOINTMENT (OUTPATIENT)
Dept: VASCULAR LAB | Facility: MEDICAL CENTER | Age: 53
End: 2018-02-13
Payer: COMMERCIAL

## 2018-02-13 DIAGNOSIS — R26.2 DISABILITY OF WALKING: ICD-10-CM

## 2018-02-13 DIAGNOSIS — M54.41 CHRONIC BILATERAL LOW BACK PAIN WITH BILATERAL SCIATICA: ICD-10-CM

## 2018-02-13 DIAGNOSIS — G89.29 CHRONIC BILATERAL LOW BACK PAIN WITH BILATERAL SCIATICA: ICD-10-CM

## 2018-02-13 DIAGNOSIS — M16.11 PRIMARY OSTEOARTHRITIS OF RIGHT HIP: ICD-10-CM

## 2018-02-13 DIAGNOSIS — R26.2 INABILITY TO AMBULATE DUE TO HIP: ICD-10-CM

## 2018-02-13 DIAGNOSIS — M54.42 CHRONIC BILATERAL LOW BACK PAIN WITH BILATERAL SCIATICA: ICD-10-CM

## 2018-02-13 DIAGNOSIS — R53.81 DEBILITY: ICD-10-CM

## 2018-02-13 NOTE — TELEPHONE ENCOUNTER
1. Caller Name: [facility]                                         Call Back Number: [phone]      Patient approves a detailed voicemail message: N\A    Patient request for a walker needs to be rewritten for a 4 wheeled walker.

## 2018-02-16 ENCOUNTER — PATIENT MESSAGE (OUTPATIENT)
Dept: MEDICAL GROUP | Age: 53
End: 2018-02-16

## 2018-02-18 ENCOUNTER — PATIENT MESSAGE (OUTPATIENT)
Dept: MEDICAL GROUP | Age: 53
End: 2018-02-18

## 2018-02-18 DIAGNOSIS — M25.551 BILATERAL HIP PAIN: ICD-10-CM

## 2018-02-18 DIAGNOSIS — M25.552 BILATERAL HIP PAIN: ICD-10-CM

## 2018-02-20 ENCOUNTER — HOME HEALTH ADMISSION (OUTPATIENT)
Dept: HOME HEALTH SERVICES | Facility: HOME HEALTHCARE | Age: 53
End: 2018-02-20
Payer: COMMERCIAL

## 2018-02-20 NOTE — TELEPHONE ENCOUNTER
1. Caller Name: Karine Ovalle                                         Call Back Number: My Chart      Patient approves a detailed voicemail message: N\A    2. SPECIFIC Action To Be Taken: Referral pending, please sign.    3. Diagnosis/Clinical Reason for Request: Bilateral Hip pain    4. Specialty & Provider Name/Lab/Imaging Location: [facility]    5. Is appointment scheduled for requested order/referral: no    Patient informed they will receive a return phone call from the office ONLY if there are any questions before processing their request. Advised to call back if they haven't received a call from the referral department in 5 days.

## 2018-02-20 NOTE — TELEPHONE ENCOUNTER
"Home health consult ordered tocreate and establish a plan of care.   Pt wants a \"care-giver\" to help with ADL's, so please advise her of community resources for this (list of names, companies, etc) and tell her that insurance does not cover \"care givers\"  "

## 2018-02-21 ENCOUNTER — PATIENT MESSAGE (OUTPATIENT)
Dept: MEDICAL GROUP | Age: 53
End: 2018-02-21

## 2018-02-21 ENCOUNTER — HOME CARE VISIT (OUTPATIENT)
Dept: HOME HEALTH SERVICES | Facility: HOME HEALTHCARE | Age: 53
End: 2018-02-21

## 2018-02-21 NOTE — TELEPHONE ENCOUNTER
From: Karine Ovalle  To: Micky Rubin M.D.  Sent: 2/21/2018 10:07 AM PST  Subject: Procedure Question    ,  This second request to have MRI ordered of both hips don, so Dr. Bryon Chavez/[facility] could review it...my appointment with Dr. Garcia is tomorrow - 2/22/18 @ 1:00 pm.     TD

## 2018-02-21 NOTE — TELEPHONE ENCOUNTER
No; dr nowak will need to order it if he thinks it is necessary- and it can be done at their facility; please call and let pt know.

## 2018-02-22 ENCOUNTER — HOSPITAL ENCOUNTER (OUTPATIENT)
Dept: LAB | Facility: MEDICAL CENTER | Age: 53
End: 2018-02-22
Attending: NURSE PRACTITIONER
Payer: COMMERCIAL

## 2018-02-22 DIAGNOSIS — E66.01 SEVERE OBESITY (BMI >= 40) (HCC): ICD-10-CM

## 2018-02-22 LAB
ALBUMIN SERPL BCP-MCNC: 3.7 G/DL (ref 3.2–4.9)
ALBUMIN/GLOB SERPL: 1.1 G/DL
ALP SERPL-CCNC: 90 U/L (ref 30–99)
ALT SERPL-CCNC: 8 U/L (ref 2–50)
ANION GAP SERPL CALC-SCNC: 9 MMOL/L (ref 0–11.9)
AST SERPL-CCNC: 16 U/L (ref 12–45)
BILIRUB SERPL-MCNC: 0.4 MG/DL (ref 0.1–1.5)
BNP SERPL-MCNC: 47 PG/ML (ref 0–100)
BUN SERPL-MCNC: 16 MG/DL (ref 8–22)
CALCIUM SERPL-MCNC: 9.2 MG/DL (ref 8.5–10.5)
CHLORIDE SERPL-SCNC: 102 MMOL/L (ref 96–112)
CHOLEST SERPL-MCNC: 168 MG/DL (ref 100–199)
CO2 SERPL-SCNC: 25 MMOL/L (ref 20–33)
CREAT SERPL-MCNC: 0.62 MG/DL (ref 0.5–1.4)
CREAT UR-MCNC: 106 MG/DL
GLOBULIN SER CALC-MCNC: 3.5 G/DL (ref 1.9–3.5)
GLUCOSE SERPL-MCNC: 82 MG/DL (ref 65–99)
HDLC SERPL-MCNC: 67 MG/DL
LDLC SERPL CALC-MCNC: 84 MG/DL
MICROALBUMIN UR-MCNC: <0.7 MG/DL
MICROALBUMIN/CREAT UR: NORMAL MG/G (ref 0–30)
POTASSIUM SERPL-SCNC: 3.5 MMOL/L (ref 3.6–5.5)
PROT SERPL-MCNC: 7.2 G/DL (ref 6–8.2)
SODIUM SERPL-SCNC: 136 MMOL/L (ref 135–145)
TRIGL SERPL-MCNC: 85 MG/DL (ref 0–149)

## 2018-02-22 PROCEDURE — 82570 ASSAY OF URINE CREATININE: CPT

## 2018-02-22 PROCEDURE — 82043 UR ALBUMIN QUANTITATIVE: CPT

## 2018-02-22 PROCEDURE — 36415 COLL VENOUS BLD VENIPUNCTURE: CPT

## 2018-02-22 PROCEDURE — 83880 ASSAY OF NATRIURETIC PEPTIDE: CPT

## 2018-02-22 PROCEDURE — 80061 LIPID PANEL: CPT

## 2018-02-22 PROCEDURE — 80053 COMPREHEN METABOLIC PANEL: CPT

## 2018-02-22 NOTE — TELEPHONE ENCOUNTER
From: Karine Ovalle  To: Micky Rubin M.D.  Sent: 2/21/2018 7:02 PM PST  Subject: Procedure Question    So sorry to bother you or you office staff about my worries. Well when I had my knee surgery, Dr. Mendoza did request for me to have a MRI and I believe it was at their facilities off [facility]. So I had to pay up front the expensive co-pay.  I'll let you know how things go after my appt tomorrow.  Thanks for everything, see you 2/28 for my appt with you.    TD  ----- Message -----  From: Micky Rubin M.D.  Sent: 2/21/2018 5:29 PM PST  To: Karine Ovalle  Subject: RE: Procedure Question  You do not need an mri of your hips, in my opinion; you just need to get your hip replaced. If dr levine wants mri's after you see him, he will order them at [facility]. He must see you 1st before he will know if you need one.     ----- Message -----   From: Karine Ovalle   Sent: 2/21/2018 4:20 PM PST   To: Micky Rubin M.D.  Subject: Procedure Question     Please, Please ,,  Please order the MRI for both of my hips,because through a [facility] it is most financial for me at this time to have the MRI done there.  At 100% Dr. Bryon Levine will want me to have a MRI done of the hips and for me to pay $300 up front...I had to when he did my knee. Which at the moment I don't have any $. I'm in process of getting disability benefits ...hope that you faxed the physician's form asa. [facility] will work Mr on payments for MRI.    AUTUMN

## 2018-02-22 NOTE — TELEPHONE ENCOUNTER
You will have the same copay for the mri whether I order it or dr nowak orders it, and whether done at [facility] or at the [facility]. Just so you know

## 2018-02-26 ENCOUNTER — PATIENT MESSAGE (OUTPATIENT)
Dept: MEDICAL GROUP | Age: 53
End: 2018-02-26

## 2018-02-28 ENCOUNTER — OFFICE VISIT (OUTPATIENT)
Dept: MEDICAL GROUP | Age: 53
End: 2018-02-28
Payer: COMMERCIAL

## 2018-02-28 ENCOUNTER — PATIENT MESSAGE (OUTPATIENT)
Dept: VASCULAR LAB | Facility: MEDICAL CENTER | Age: 53
End: 2018-02-28

## 2018-02-28 VITALS
HEIGHT: 63 IN | WEIGHT: 219 LBS | HEART RATE: 104 BPM | OXYGEN SATURATION: 95 % | DIASTOLIC BLOOD PRESSURE: 74 MMHG | TEMPERATURE: 96.8 F | BODY MASS INDEX: 38.8 KG/M2 | RESPIRATION RATE: 20 BRPM | SYSTOLIC BLOOD PRESSURE: 134 MMHG

## 2018-02-28 DIAGNOSIS — L02.415 ABSCESS OF RIGHT LEG: ICD-10-CM

## 2018-02-28 DIAGNOSIS — M16.11 PRIMARY OSTEOARTHRITIS OF RIGHT HIP: ICD-10-CM

## 2018-02-28 DIAGNOSIS — M54.41 CHRONIC BILATERAL LOW BACK PAIN WITH BILATERAL SCIATICA: ICD-10-CM

## 2018-02-28 DIAGNOSIS — G89.29 CHRONIC PAIN OF RIGHT HIP: ICD-10-CM

## 2018-02-28 DIAGNOSIS — L03.115 CELLULITIS OF RIGHT LOWER EXTREMITY: ICD-10-CM

## 2018-02-28 DIAGNOSIS — Z12.11 SCREEN FOR COLON CANCER: ICD-10-CM

## 2018-02-28 DIAGNOSIS — M54.42 CHRONIC BILATERAL LOW BACK PAIN WITH BILATERAL SCIATICA: ICD-10-CM

## 2018-02-28 DIAGNOSIS — M25.551 CHRONIC PAIN OF RIGHT HIP: ICD-10-CM

## 2018-02-28 DIAGNOSIS — G89.29 CHRONIC BILATERAL LOW BACK PAIN WITH BILATERAL SCIATICA: ICD-10-CM

## 2018-02-28 DIAGNOSIS — F11.20 UNCOMPLICATED OPIOID DEPENDENCE (HCC): ICD-10-CM

## 2018-02-28 DIAGNOSIS — G89.4 CHRONIC PAIN SYNDROME: ICD-10-CM

## 2018-02-28 DIAGNOSIS — M17.11 PRIMARY OSTEOARTHRITIS OF RIGHT KNEE: ICD-10-CM

## 2018-02-28 PROCEDURE — 99215 OFFICE O/P EST HI 40 MIN: CPT | Performed by: INTERNAL MEDICINE

## 2018-02-28 RX ORDER — MORPHINE SULFATE 30 MG/1
30 TABLET, FILM COATED, EXTENDED RELEASE ORAL EVERY 12 HOURS
Qty: 60 TAB | Refills: 0 | Status: SHIPPED | OUTPATIENT
Start: 2018-02-28 | End: 2018-03-19 | Stop reason: SDUPTHER

## 2018-02-28 RX ORDER — SULFAMETHOXAZOLE AND TRIMETHOPRIM 800; 160 MG/1; MG/1
1 TABLET ORAL 2 TIMES DAILY
Qty: 28 TAB | Refills: 1 | Status: SHIPPED | OUTPATIENT
Start: 2018-02-28 | End: 2018-02-28 | Stop reason: SDUPTHER

## 2018-02-28 RX ORDER — OXYCODONE AND ACETAMINOPHEN 10; 325 MG/1; MG/1
1 TABLET ORAL EVERY 6 HOURS PRN
Qty: 120 TAB | Refills: 0 | Status: SHIPPED | OUTPATIENT
Start: 2018-02-28 | End: 2018-03-19 | Stop reason: SDUPTHER

## 2018-02-28 RX ORDER — OXYCODONE AND ACETAMINOPHEN 10; 325 MG/1; MG/1
1 TABLET ORAL EVERY 8 HOURS PRN
Qty: 90 TAB | Refills: 0 | Status: SHIPPED | OUTPATIENT
Start: 2018-02-28 | End: 2018-02-28 | Stop reason: SDUPTHER

## 2018-02-28 RX ORDER — SULFAMETHOXAZOLE AND TRIMETHOPRIM 800; 160 MG/1; MG/1
1 TABLET ORAL 2 TIMES DAILY
Qty: 28 TAB | Refills: 1 | Status: SHIPPED | OUTPATIENT
Start: 2018-02-28 | End: 2018-04-26

## 2018-02-28 ASSESSMENT — ENCOUNTER SYMPTOMS
BACK PAIN: 1
RESPIRATORY NEGATIVE: 1
NEUROLOGICAL NEGATIVE: 1
EYES NEGATIVE: 1
GASTROINTESTINAL NEGATIVE: 1
PSYCHIATRIC NEGATIVE: 1
CONSTITUTIONAL NEGATIVE: 1

## 2018-02-28 NOTE — LETTER
February 28, 2018        Karine Fox Vasquez  [address]  [place] [address]        Dear Karine:       Current Outpatient Prescriptions   Medication Sig Dispense Refill   • morphine ER (MS CONTIN) 30 MG Tab CR tablet Take 1 Tab by mouth every 12 hours for 30 days. 60 Tab 0   • mupirocin (BACTROBAN) 2 % Ointment Apply 1 Application to affected area(s) every day. 1 Tube 1   • sulfamethoxazole-trimethoprim (BACTRIM DS) 800-160 MG tablet Take 1 Tab by mouth 2 times a day. 28 Tab 1   • oxyCODONE-acetaminophen (PERCOCET-10)  MG Tab Take 1 Tab by mouth every 6 hours as needed for Severe Pain for up to 30 days. 120 Tab 0   • metaxalone (SKELAXIN) 800 MG Tab Take 1 Tab by mouth 3 times a day for 90 days. 90 Tab 4   • pregabalin (LYRICA) 75 MG Cap Take 1 Cap by mouth 3 times a day for 28 days. 90 Cap 11   • omeprazole (PRILOSEC) 20 MG delayed-release capsule Take 1 Cap by mouth every day. 30 Cap 0   • ibuprofen (MOTRIN) 800 MG Tab Take 1 Tab by mouth every 8 hours as needed. 30 Tab 0   • furosemide (LASIX) 20 MG Tab Take 1 Tab by mouth every day. 90 Tab 4   • potassium chloride ER (KLOR-CON) 10 MEQ tablet Take 1 Tab by mouth every day. 90 Tab 4   • olopatadine (PATANOL) 0.1 % ophthalmic solution Place 1 Drop in both eyes 2 times a day. 5 mL 11   • cyanocobalamin (VITAMIN B12) 1000 MCG Tab Take 1 Tab by mouth every day. 90 Tab 4   • Lidocaine HCl 3 % Cream Apply cream to affected area 2 times daily as needed 1 Tube 0   • multivitamin (THERAGRAN) Tab Take 1 Tab by mouth every day.     • vitamin D (CHOLECALCIFEROL) 1000 UNIT Tab Take 1,000 Units by mouth every day.     • Misc. Devices Misc Front wheeled walker with attached seat 1 Each 0     No current facility-administered medications for this visit.          If you have any questions or concerns, please don't hesitate to call.        Sincerely,        Micky Rubin M.D.    Electronically Signed

## 2018-03-01 NOTE — PROGRESS NOTES
Subjective:      Karine Ovalle is a 52 y.o. female who presents with Follow-Up (1 month)  The patient comes in her monthly pain management follow-up for her chronic hip pain, low back pain and right leg pain. She is awaiting right total hip replacement. She needs surgical clearance however. Since last visit, she has been trying to get a wound on her right lower leg. He'll following up on biopsy which was benign almost 1 month ago. He still has a scab.    Is also here for surgical clearance for her right total hip replacement.    Since last visit patient had more pain and was increase her Percocet from 3-4 pills a day, and continue on her MS Contin unchanged.    Outpatient Medications Prior to Visit   Medication Sig Dispense Refill   • metaxalone (SKELAXIN) 800 MG Tab Take 1 Tab by mouth 3 times a day for 90 days. 90 Tab 4   • pregabalin (LYRICA) 75 MG Cap Take 1 Cap by mouth 3 times a day for 28 days. 90 Cap 11   • omeprazole (PRILOSEC) 20 MG delayed-release capsule Take 1 Cap by mouth every day. 30 Cap 0   • ibuprofen (MOTRIN) 800 MG Tab Take 1 Tab by mouth every 8 hours as needed. 30 Tab 0   • furosemide (LASIX) 20 MG Tab Take 1 Tab by mouth every day. 90 Tab 4   • potassium chloride ER (KLOR-CON) 10 MEQ tablet Take 1 Tab by mouth every day. 90 Tab 4   • olopatadine (PATANOL) 0.1 % ophthalmic solution Place 1 Drop in both eyes 2 times a day. 5 mL 11   • cyanocobalamin (VITAMIN B12) 1000 MCG Tab Take 1 Tab by mouth every day. 90 Tab 4   • Lidocaine HCl 3 % Cream Apply cream to affected area 2 times daily as needed 1 Tube 0   • multivitamin (THERAGRAN) Tab Take 1 Tab by mouth every day.     • vitamin D (CHOLECALCIFEROL) 1000 UNIT Tab Take 1,000 Units by mouth every day.     • Misc. Devices Misc Front wheeled walker with attached seat 1 Each 0   • oxyCODONE-acetaminophen (PERCOCET-10)  MG Tab Take 1 Tab by mouth every 8 hours as needed for Severe Pain for up to 28 days. 90 Tab 0   • morphine ER (MS  "CONTIN) 30 MG Tab CR tablet Take 1 Tab by mouth every 12 hours for 28 days. 60 Tab 0     No facility-administered medications prior to visit.      Patient also needs follow-up of her disability and has been deemed fully disabled for the next 6 months and very to her multiple medical problems listed below and her chronic pain syndrome and her need for upcoming hip surgery.    Patient Active Problem List    Diagnosis Date Noted   • Mild intermittent asthma without complication 04/25/2012     Priority: High   • Cellulitis of right lower extremity 02/28/2018   • Disability of walking- due to severe bilateral hip DJD- needs bilat THR- placed on full disability 2/5/18-2/5/19 02/05/2018   • Uncomplicated opioid dependence (CMS-HCC)- pcp dr hackett to rx 01/31/2018   • Essential hypertension 01/31/2018   • Primary osteoarthritis of right hip- severe; need THR; REF ORTHO DR JERRY 01/22/2018   • Mass of left lower leg- punch bx neg 1/18/2018 01/22/2018   • Lymphedema 10/17/2017   • Debility- FMLA through april 2018 10/12/2017   • Venous insufficiency 10/03/2017   • Colon cancer screening- needs 08/09/2017   • Chronic bilateral low back pain with bilateral sciatica- pain mgt 07/26/2017   • Obesity, morbid, BMI 40.0-49.9 (CMS-HCC)- s/p gastric bypass 10/21/2016   • Vitamin B 12 deficiency 06/02/2016   • Fibroids 08/13/2013     Allergies   Allergen Reactions   • Zofran [Dextrose-Ondansetron] Nausea               HPI    Review of Systems   Constitutional: Negative.    HENT: Negative.    Eyes: Negative.    Respiratory: Negative.    Cardiovascular: Positive for leg swelling.   Gastrointestinal: Negative.    Genitourinary: Negative.    Musculoskeletal: Positive for back pain and joint pain.   Skin: Negative.    Neurological: Negative.    Endo/Heme/Allergies: Negative.    Psychiatric/Behavioral: Negative.           Objective:     /74   Pulse (!) 104   Temp 36 °C (96.8 °F)   Resp 20   Ht 1.6 m (5' 2.99\")   Wt 99.3 kg (219 " lb)   LMP 05/17/2017   SpO2 95%   Breastfeeding? No   BMI 38.80 kg/m²      Physical Exam   Constitutional: She is oriented to person, place, and time. She appears well-developed and well-nourished. No distress.   HENT:   Head: Normocephalic and atraumatic.   Right Ear: External ear normal.   Left Ear: External ear normal.   Nose: Nose normal.   Mouth/Throat: Oropharynx is clear and moist. No oropharyngeal exudate.   Eyes: Conjunctivae and EOM are normal. Pupils are equal, round, and reactive to light. Right eye exhibits no discharge. Left eye exhibits no discharge. No scleral icterus.   Neck: Normal range of motion. Neck supple. No JVD present. No tracheal deviation present. No thyromegaly present.   Cardiovascular: Normal rate, regular rhythm, normal heart sounds and intact distal pulses.  Exam reveals no gallop and no friction rub.    No murmur heard.  Pulmonary/Chest: Effort normal and breath sounds normal. No stridor. No respiratory distress. She has no wheezes. She has no rales. She exhibits no tenderness.   Abdominal: Soft. Bowel sounds are normal. She exhibits no distension and no mass. There is no tenderness. There is no rebound and no guarding.   Musculoskeletal: Normal range of motion. She exhibits edema. She exhibits no tenderness.   Lymphadenopathy:     She has no cervical adenopathy.   Neurological: She is alert and oriented to person, place, and time. She has normal reflexes. She displays normal reflexes. No cranial nerve deficit. She exhibits normal muscle tone. Coordination normal.   Skin: Skin is warm and dry. Rash noted. She is not diaphoretic. No erythema. No pallor.   There is a 1 cm ulcerated poorly healing lesion from previous punch biopsy on the right lower inner leg which is scabbed over. It is improved about 50% last visit a month ago. Not draining. Not tender, and no peripheral erythema..   Psychiatric: She has a normal mood and affect. Her behavior is normal. Judgment and thought  content normal.   Vitals reviewed.    Hospital Outpatient Visit on 02/22/2018   Component Date Value   • Cholesterol,Tot 02/22/2018 168    • Triglycerides 02/22/2018 85    • HDL 02/22/2018 67    • LDL 02/22/2018 84    • Sodium 02/22/2018 136    • Potassium 02/22/2018 3.5*   • Chloride 02/22/2018 102    • Co2 02/22/2018 25    • Anion Gap 02/22/2018 9.0    • Glucose 02/22/2018 82    • Bun 02/22/2018 16    • Creatinine 02/22/2018 0.62    • Calcium 02/22/2018 9.2    • AST(SGOT) 02/22/2018 16    • ALT(SGPT) 02/22/2018 8    • Alkaline Phosphatase 02/22/2018 90    • Total Bilirubin 02/22/2018 0.4    • Albumin 02/22/2018 3.7    • Total Protein 02/22/2018 7.2    • Globulin 02/22/2018 3.5    • A-G Ratio 02/22/2018 1.1    • Creatinine, Urine 02/22/2018 106.00    • Microalbumin, Urine Rand* 02/22/2018 <0.7    • Micro Alb Creat Ratio 02/22/2018 see below    • B Natriuretic Peptide 02/22/2018 47    • GFR If  02/22/2018 >60    • GFR If Non  Ameri* 02/22/2018 >60       Lab Results   Component Value Date/Time    HBA1C 5.3 06/12/2017 09:03 AM     Lab Results   Component Value Date/Time    SODIUM 136 02/22/2018 05:05 PM    POTASSIUM 3.5 (L) 02/22/2018 05:05 PM    CHLORIDE 102 02/22/2018 05:05 PM    CO2 25 02/22/2018 05:05 PM    GLUCOSE 82 02/22/2018 05:05 PM    BUN 16 02/22/2018 05:05 PM    CREATININE 0.62 02/22/2018 05:05 PM    CREATININE 0.88 08/14/2010 12:00 AM    BUNCREATRAT 13 08/14/2010 12:00 AM    GLOMRATE >59 08/14/2010 12:00 AM    ALKPHOSPHAT 90 02/22/2018 05:05 PM    ASTSGOT 16 02/22/2018 05:05 PM    ALTSGPT 8 02/22/2018 05:05 PM    TBILIRUBIN 0.4 02/22/2018 05:05 PM     Lab Results   Component Value Date/Time    INR 1.04 05/18/2012 12:20 PM     Lab Results   Component Value Date/Time    CHOLSTRLTOT 168 02/22/2018 05:05 PM    LDL 84 02/22/2018 05:05 PM    HDL 67 02/22/2018 05:05 PM    TRIGLYCERIDE 85 02/22/2018 05:05 PM       No results found for: TESTOSTERONE  Lab Results   Component Value Date/Time     TSH 2.110 08/14/2010 12:00 AM     Lab Results   Component Value Date/Time    FREET4 0.72 05/03/2016 07:32 AM     Lab Results   Component Value Date/Time    URICACID 5.6 08/12/2014 04:49 PM     No components found for: VITB12  Lab Results   Component Value Date/Time    25HYDROXY 33 08/12/2014 04:49 PM    25HYDROXY 25 (L) 07/03/2012 09:20 AM                 Assessment/Plan:     1. Uncomplicated opioid dependence (CMS-[facility])- pcp dr hackett to rx   -Not well controlled on current regimen of only 3 Percocets a day. She'll can increase to 4 Percocets a day of the 10/325 strength and continue the MS Contin and unchanged. Recheck in month for refills.      - morphine ER (MS CONTIN) 30 MG Tab CR tablet; Take 1 Tab by mouth every 12 hours for 30 days.  Dispense: 60 Tab; Refill: 0  - oxyCODONE-acetaminophen (PERCOCET-10)  MG Tab; Take 1 Tab by mouth every 6 hours as needed for Severe Pain for up to 30 days.  Dispense: 120 Tab; Refill: 0    2. Chronic bilateral low back pain with bilateral sciatica- pain mgt    As above  - morphine ER (MS CONTIN) 30 MG Tab CR tablet; Take 1 Tab by mouth every 12 hours for 30 days.  Dispense: 60 Tab; Refill: 0  - oxyCODONE-acetaminophen (PERCOCET-10)  MG Tab; Take 1 Tab by mouth every 6 hours as needed for Severe Pain for up to 30 days.  Dispense: 120 Tab; Refill: 0    3. Primary osteoarthritis of right hip- severe; need THR; REF ORTHO DR JERRY  Patient had cleared for surgery at this time due to skin lesion on right lower inner leg which appears to be partially infected. We'll start her on oral and topical antibiotics to get this to clear up more quickly and recheck it again in one month.     She'll need referral to wound clinic for assistance in managing this to get her cleared for surgery.    Her peripheral edema in both legs is unchanged and secondary to lymphedema. She will get follow-up at the vascular clinic to make sure she is cleared for surgery from their  standpoint.        - morphine ER (MS CONTIN) 30 MG Tab CR tablet; Take 1 Tab by mouth every 12 hours for 30 days.  Dispense: 60 Tab; Refill: 0  - oxyCODONE-acetaminophen (PERCOCET-10)  MG Tab; Take 1 Tab by mouth every 6 hours as needed for Severe Pain for up to 30 days.  Dispense: 120 Tab; Refill: 0    4. Cellulitis of right lower extremity          - REFERRAL TO WOUND CLINIC  - mupirocin (BACTROBAN) 2 % Ointment; Apply 1 Application to affected area(s) every day.  Dispense: 1 Tube; Refill: 1  - sulfamethoxazole-trimethoprim (BACTRIM DS) 800-160 MG tablet; Take 1 Tab by mouth 2 times a day.  Dispense: 28 Tab; Refill: 1    5. Abscess of right leg  As above- REFERRAL TO WOUND CLINIC  - mupirocin (BACTROBAN) 2 % Ointment; Apply 1 Application to affected area(s) every day.  Dispense: 1 Tube; Refill: 1  - sulfamethoxazole-trimethoprim (BACTRIM DS) 800-160 MG tablet; Take 1 Tab by mouth 2 times a day.  Dispense: 28 Tab; Refill: 1    6. Uncomplicated opioid dependence (CMS-HCC)  As above- morphine ER (MS CONTIN) 30 MG Tab CR tablet; Take 1 Tab by mouth every 12 hours for 30 days.  Dispense: 60 Tab; Refill: 0  - oxyCODONE-acetaminophen (PERCOCET-10)  MG Tab; Take 1 Tab by mouth every 6 hours as needed for Severe Pain for up to 30 days.  Dispense: 120 Tab; Refill: 0    7. Chronic pain of right hip  As above     8. Primary osteoarthritis of right knee- sp right TKR 2015 - dr nowak       9. Chronic pain syndrome- nv adv pain, dr sanders       10. Chronic bilateral low back pain with bilateral sciatica- pain mgt, dr sanders-as above. Pain management switched to me.      - morphine ER (MS CONTIN) 30 MG Tab CR tablet; Take 1 Tab by mouth every 12 hours for 30 days.  Dispense: 60 Tab; Refill: 0  - oxyCODONE-acetaminophen (PERCOCET-10)  MG Tab; Take 1 Tab by mouth every 6 hours as needed for Severe Pain for up to 30 days.  Dispense: 120 Tab; Refill: 0    11. Screen for colon cancer     - REFERRAL TO GI FOR  COLONOSCOPY  - OCCULT BLOOD FECES IMMUNOASSAY (FIT); Future      40 minute face-to-face encounter took place today.  More than half of this time was spent in the coordination of care of the above problems, as well as counseling.      If patient needs assistance with forms documenting her complete disability for the next 6 months, we will set up another office visit for this.

## 2018-03-07 ENCOUNTER — HOSPITAL ENCOUNTER (OUTPATIENT)
Dept: CARDIOLOGY | Facility: MEDICAL CENTER | Age: 53
End: 2018-03-07
Attending: NURSE PRACTITIONER
Payer: COMMERCIAL

## 2018-03-07 DIAGNOSIS — Z01.810 PRE-OPERATIVE CARDIOVASCULAR EXAMINATION: ICD-10-CM

## 2018-03-07 DIAGNOSIS — Z01.812 PRE-OPERATIVE LABORATORY EXAMINATION: ICD-10-CM

## 2018-03-07 DIAGNOSIS — M16.11 PRIMARY OSTEOARTHRITIS OF RIGHT HIP: ICD-10-CM

## 2018-03-07 LAB
ANION GAP SERPL CALC-SCNC: 9 MMOL/L (ref 0–11.9)
APPEARANCE UR: CLEAR
BASOPHILS # BLD AUTO: 0.9 % (ref 0–1.8)
BASOPHILS # BLD: 0.07 K/UL (ref 0–0.12)
BILIRUB UR QL STRIP.AUTO: NEGATIVE
BUN SERPL-MCNC: 11 MG/DL (ref 8–22)
CALCIUM SERPL-MCNC: 9.4 MG/DL (ref 8.5–10.5)
CHLORIDE SERPL-SCNC: 101 MMOL/L (ref 96–112)
CO2 SERPL-SCNC: 24 MMOL/L (ref 20–33)
COLOR UR: YELLOW
CREAT SERPL-MCNC: 0.71 MG/DL (ref 0.5–1.4)
CULTURE IF INDICATED INDCX: NO UA CULTURE
EOSINOPHIL # BLD AUTO: 0.73 K/UL (ref 0–0.51)
EOSINOPHIL NFR BLD: 9.1 % (ref 0–6.9)
ERYTHROCYTE [DISTWIDTH] IN BLOOD BY AUTOMATED COUNT: 45 FL (ref 35.9–50)
GLUCOSE SERPL-MCNC: 79 MG/DL (ref 65–99)
GLUCOSE UR STRIP.AUTO-MCNC: NEGATIVE MG/DL
HCT VFR BLD AUTO: 37.8 % (ref 37–47)
HGB BLD-MCNC: 13.1 G/DL (ref 12–16)
IMM GRANULOCYTES # BLD AUTO: 0.01 K/UL (ref 0–0.11)
IMM GRANULOCYTES NFR BLD AUTO: 0.1 % (ref 0–0.9)
KETONES UR STRIP.AUTO-MCNC: NEGATIVE MG/DL
LEUKOCYTE ESTERASE UR QL STRIP.AUTO: NEGATIVE
LV EJECT FRACT  99904: 60
LV EJECT FRACT MOD 2C 99903: 57.57
LV EJECT FRACT MOD 4C 99902: 70.07
LV EJECT FRACT MOD BP 99901: 64.48
LYMPHOCYTES # BLD AUTO: 2.29 K/UL (ref 1–4.8)
LYMPHOCYTES NFR BLD: 28.5 % (ref 22–41)
MCH RBC QN AUTO: 28.4 PG (ref 27–33)
MCHC RBC AUTO-ENTMCNC: 34.7 G/DL (ref 33.6–35)
MCV RBC AUTO: 82 FL (ref 81.4–97.8)
MICRO URNS: NORMAL
MONOCYTES # BLD AUTO: 0.6 K/UL (ref 0–0.85)
MONOCYTES NFR BLD AUTO: 7.5 % (ref 0–13.4)
NEUTROPHILS # BLD AUTO: 4.34 K/UL (ref 2–7.15)
NEUTROPHILS NFR BLD: 53.9 % (ref 44–72)
NITRITE UR QL STRIP.AUTO: NEGATIVE
NRBC # BLD AUTO: 0 K/UL
NRBC BLD-RTO: 0 /100 WBC
PH UR STRIP.AUTO: 5.5 [PH]
PLATELET # BLD AUTO: 327 K/UL (ref 164–446)
PMV BLD AUTO: 9.8 FL (ref 9–12.9)
POTASSIUM SERPL-SCNC: 3.8 MMOL/L (ref 3.6–5.5)
PROT UR QL STRIP: NEGATIVE MG/DL
RBC # BLD AUTO: 4.61 M/UL (ref 4.2–5.4)
RBC UR QL AUTO: NEGATIVE
SCCMEC + MECA PNL NOSE NAA+PROBE: NEGATIVE
SCCMEC + MECA PNL NOSE NAA+PROBE: NEGATIVE
SODIUM SERPL-SCNC: 134 MMOL/L (ref 135–145)
SP GR UR STRIP.AUTO: 1.01
UROBILINOGEN UR STRIP.AUTO-MCNC: 1 MG/DL
WBC # BLD AUTO: 8 K/UL (ref 4.8–10.8)

## 2018-03-07 PROCEDURE — 93306 TTE W/DOPPLER COMPLETE: CPT

## 2018-03-07 PROCEDURE — 36415 COLL VENOUS BLD VENIPUNCTURE: CPT

## 2018-03-07 PROCEDURE — 93005 ELECTROCARDIOGRAM TRACING: CPT

## 2018-03-07 PROCEDURE — 87640 STAPH A DNA AMP PROBE: CPT

## 2018-03-07 PROCEDURE — 93010 ELECTROCARDIOGRAM REPORT: CPT | Performed by: INTERNAL MEDICINE

## 2018-03-07 PROCEDURE — 81003 URINALYSIS AUTO W/O SCOPE: CPT

## 2018-03-07 PROCEDURE — 80048 BASIC METABOLIC PNL TOTAL CA: CPT

## 2018-03-07 PROCEDURE — 87641 MR-STAPH DNA AMP PROBE: CPT

## 2018-03-07 PROCEDURE — 85025 COMPLETE CBC W/AUTO DIFF WBC: CPT

## 2018-03-07 RX ORDER — OMEGA-3 FATTY ACIDS/FISH OIL 300-1000MG
2 CAPSULE ORAL PRN
COMMUNITY
End: 2018-04-26

## 2018-03-08 LAB — EKG IMPRESSION: NORMAL

## 2018-03-08 NOTE — DISCHARGE PLANNING
DISCHARGE PLANNING NOTE - TOTAL JOINT     Procedure: Procedure(s):  HIP ARTHROPLASTY TOTAL  Procedure Date: 3/20/2018  Insurance:  [facility]   Equipment currently available at home? cane, four-wheel walker and wheelchair  Steps into the home? 1  Steps within the home? 0  Toilet height? Standard  Type of shower? tub-shower  Who will be with you during your recovery? other: states none  Is Outpatient Physical Therapy set up after surgery? No   Did you take the Total Joint Class and where? No     Plan: Discussed discharge planning via telephone. She would like to go to a facility after surgery stating she has no one to help at home. She went to [facility] in 2015 and does not want to go back there. Reviewed other available SNF in [place]. She would be interested in [facility]. I explained the process and that insurance would need to approve payment for that level of care. She has applied for [facility] Access and has an appointment on 3/16/18. Encouraged her to go to [facility] for a transfer tub bench and raised toilet seat. She is hoping that the landlord will have a ADA toilet installed by surgery. She would like food and housing payment resources. Care Transitions to see patient regarding discharge disposition and provide resources.

## 2018-03-09 ENCOUNTER — PATIENT MESSAGE (OUTPATIENT)
Dept: MEDICAL GROUP | Age: 53
End: 2018-03-09

## 2018-03-09 DIAGNOSIS — R68.2 DRY MOUTH: ICD-10-CM

## 2018-03-12 ENCOUNTER — TELEPHONE (OUTPATIENT)
Dept: MEDICAL GROUP | Facility: PHYSICIAN GROUP | Age: 53
End: 2018-03-12

## 2018-03-13 ENCOUNTER — OFFICE VISIT (OUTPATIENT)
Dept: VASCULAR LAB | Facility: MEDICAL CENTER | Age: 53
End: 2018-03-13
Attending: INTERNAL MEDICINE
Payer: COMMERCIAL

## 2018-03-13 VITALS
HEART RATE: 93 BPM | BODY MASS INDEX: 41 KG/M2 | SYSTOLIC BLOOD PRESSURE: 144 MMHG | HEIGHT: 62 IN | WEIGHT: 222.8 LBS | DIASTOLIC BLOOD PRESSURE: 77 MMHG

## 2018-03-13 DIAGNOSIS — I87.2 VENOUS INSUFFICIENCY: ICD-10-CM

## 2018-03-13 DIAGNOSIS — I89.0 LYMPHEDEMA: ICD-10-CM

## 2018-03-13 DIAGNOSIS — I10 ESSENTIAL HYPERTENSION: ICD-10-CM

## 2018-03-13 PROCEDURE — 99212 OFFICE O/P EST SF 10 MIN: CPT | Performed by: NURSE PRACTITIONER

## 2018-03-13 PROCEDURE — 99213 OFFICE O/P EST LOW 20 MIN: CPT | Performed by: NURSE PRACTITIONER

## 2018-03-13 ASSESSMENT — ENCOUNTER SYMPTOMS
DEPRESSION: 0
DIZZINESS: 0
DOUBLE VISION: 0
BACK PAIN: 1
FOCAL WEAKNESS: 0
HEADACHES: 0
SPEECH CHANGE: 0
HEARTBURN: 0
CLAUDICATION: 0
NAUSEA: 0
EYE PAIN: 0
PALPITATIONS: 0
VOMITING: 0
BLURRED VISION: 0

## 2018-03-13 NOTE — PROGRESS NOTES
Follow Up VASCULAR VISIT  Subjective:   Karine Ovalle is a 52 y.o. female who presents today 03/13/2018 for   Chief Complaint   Patient presents with   • Follow-Up       HPI:  Pt here today for initial evaluation of chronic venous insufficiency and lymphedema.    She is here today for surgical clearance for a hip replacement  Does not regularly take BP at home  No history of blood clots  No premature cardiac events in her family hx  Will get medical clearance from Dr. Rubin tomorrow  Denies any recent CP, SOB, palpitations.  LE swelling has been unchanged since last visit.  Had a LE wound which was biopsied and now has healed completely    History   Smoking Status   • Never Smoker   Smokeless Tobacco   • Never Used     Social History   Substance Use Topics   • Smoking status: Never Smoker   • Smokeless tobacco: Never Used   • Alcohol use 2.0 oz/week     4 Glasses of wine per week      Comment: 4 drinks a week     Outpatient Encounter Prescriptions as of 3/13/2018   Medication Sig Dispense Refill   • Ibuprofen 200 MG Cap Take 2 Caps by mouth as needed.     • morphine ER (MS CONTIN) 30 MG Tab CR tablet Take 1 Tab by mouth every 12 hours for 30 days. 60 Tab 0   • mupirocin (BACTROBAN) 2 % Ointment Apply 1 Application to affected area(s) every day. 1 Tube 1   • sulfamethoxazole-trimethoprim (BACTRIM DS) 800-160 MG tablet Take 1 Tab by mouth 2 times a day. 28 Tab 1   • oxyCODONE-acetaminophen (PERCOCET-10)  MG Tab Take 1 Tab by mouth every 6 hours as needed for Severe Pain for up to 30 days. 120 Tab 0   • Misc. Devices Misc Front wheeled walker with attached seat 1 Each 0   • metaxalone (SKELAXIN) 800 MG Tab Take 1 Tab by mouth 3 times a day for 90 days. 90 Tab 4   • omeprazole (PRILOSEC) 20 MG delayed-release capsule Take 1 Cap by mouth every day. 30 Cap 0   • furosemide (LASIX) 20 MG Tab Take 1 Tab by mouth every day. 90 Tab 4   • potassium chloride ER (KLOR-CON) 10 MEQ tablet Take 1 Tab by mouth every  "day. 90 Tab 4   • olopatadine (PATANOL) 0.1 % ophthalmic solution Place 1 Drop in both eyes 2 times a day. 5 mL 11   • cyanocobalamin (VITAMIN B12) 1000 MCG Tab Take 1 Tab by mouth every day. 90 Tab 4   • Lidocaine HCl 3 % Cream Apply cream to affected area 2 times daily as needed 1 Tube 0   • multivitamin (THERAGRAN) Tab Take 1 Tab by mouth every day.     • vitamin D (CHOLECALCIFEROL) 1000 UNIT Tab Take 1,000 Units by mouth every day.       No facility-administered encounter medications on file as of 3/13/2018.      Allergies   Allergen Reactions   • Zofran [Dextrose-Ondansetron] Nausea     DIET AND EXERCISE:  Weight Change: none  Diet: common adult  Exercise: no regular exercise program     Review of Systems   Constitutional: Positive for malaise/fatigue.   HENT: Negative for hearing loss.    Eyes: Negative for blurred vision, double vision and pain.   Cardiovascular: Positive for leg swelling. Negative for chest pain, palpitations and claudication.   Gastrointestinal: Negative for heartburn, nausea and vomiting.   Genitourinary: Negative for frequency, hematuria and urgency.   Musculoskeletal: Positive for back pain and joint pain.   Skin: Negative for itching and rash.   Neurological: Negative for dizziness, speech change, focal weakness and headaches.   Psychiatric/Behavioral: Negative for depression.      Objective:     Vitals:    03/13/18 1522 03/13/18 1532   BP: 142/71 144/77   Pulse: 94 93   Weight: 101.1 kg (222 lb 12.8 oz)    Height: 1.575 m (5' 2\")       Body mass index is 40.75 kg/m².  Physical Exam   Constitutional: She is oriented to person, place, and time. She appears well-developed and well-nourished.   HENT:   Head: Normocephalic and atraumatic.   Eyes: Pupils are equal, round, and reactive to light.   Neck: Normal range of motion. No JVD present. No thyromegaly present.   Cardiovascular: Regular rhythm.    No murmur heard.  Pulmonary/Chest: Effort normal and breath sounds normal. No respiratory " distress. She has no wheezes. She has no rales. She exhibits no tenderness.   Musculoskeletal: Normal range of motion. She exhibits edema. She exhibits no tenderness or deformity.   Neurological: She is alert and oriented to person, place, and time. Coordination normal.   Skin: Skin is warm and dry.   Psychiatric: She has a normal mood and affect. Her behavior is normal.   Vitals reviewed.    Lab Results   Component Value Date    CHOLSTRLTOT 168 02/22/2018    LDL 84 02/22/2018    HDL 67 02/22/2018    TRIGLYCERIDE 85 02/22/2018         Lab Results   Component Value Date    HBA1C 5.3 06/12/2017      Lab Results   Component Value Date    SODIUM 134 (L) 03/07/2018    POTASSIUM 3.8 03/07/2018    CHLORIDE 101 03/07/2018    CO2 24 03/07/2018    GLUCOSE 79 03/07/2018    BUN 11 03/07/2018    CREATININE 0.71 03/07/2018    IFAFR[place] >60 03/07/2018    IFNOTAFR >60 03/07/2018        Lab Results   Component Value Date    WBC 8.0 03/07/2018    RBC 4.61 03/07/2018    HEMOGLOBIN 13.1 03/07/2018    HEMATOCRIT 37.8 03/07/2018    MCV 82.0 03/07/2018    MCH 28.4 03/07/2018    MCHC 34.7 03/07/2018    MPV 9.8 03/07/2018      Medical Decision Making:  Today's Assessment / Status / Plan:     1. Essential hypertension     2. Lymphedema     3. Venous insufficiency       Patient Type: Primary Prevention    Etiology of Established CVD if Present:     Lipid Management: Qualifies for Statin Therapy Based on 2013 ACC/AHA Guidelines: unknown  Calculated 10-Year Risk of ASCVD: 2.3%  Currently on Statin: No  Lipid panel shows optimal control without statin therapy LDL 84, nonHDL 101  - Continue therapeutic lifestyle changes especially increased exercise following hip replacement  - Check lipid panel in 6 months.    Blood Pressure Management:Goal: JNC8 (2013) Office BP Goal:<140/90; Under Control: yes  Slightly elevated in office today she states due to rushing to appt since she was late.  Appears controlled at other office visits.  - Encouraged  occasional home monitoring.    Glycemic Status: Normal    Anti-Platelet/Anti-Coagulant Tx: no    Smoking: Continue complete cessation     Physical Activity: Advised to engage in walking, referred to physical therapy, frequency : goal is  3-4 times a week.  Stressed the importance of walking for improved circulation and weight loss.      Weight Management and Nutrition: Dietary plan was discussed with patient at this visit including Mediterranean style diet, low carbohydrate.    Other:   1. Lymphedema-  Explained treatment with continued wrapping with PT, elevation, use of compression stockings and increase in exercise as much as possible.  Other potential options include restarting lymphedema massage which has been very helpful for her in the past.  Also home compression pumps could be utilized in the future.     Patient is cleared from a Vascular standpoint for hip surgery-- discussed in detail signs and symptoms of VTE.    Instructed to follow-up with PCP for remainder of adult medical needs: yes  We will partner with other providers in the management of established vascular disease and cardiometabolic risk factors.    Studies to Be Obtained: None at this time  Labs to Be Obtained: None at this time    Follow up in: 4 months    ЮЛИЯ Restrepo.

## 2018-03-14 ENCOUNTER — TELEPHONE (OUTPATIENT)
Dept: VASCULAR LAB | Facility: MEDICAL CENTER | Age: 53
End: 2018-03-14

## 2018-03-14 ENCOUNTER — OFFICE VISIT (OUTPATIENT)
Dept: MEDICAL GROUP | Age: 53
End: 2018-03-14

## 2018-03-14 VITALS
BODY MASS INDEX: 38.09 KG/M2 | DIASTOLIC BLOOD PRESSURE: 78 MMHG | SYSTOLIC BLOOD PRESSURE: 110 MMHG | OXYGEN SATURATION: 100 % | RESPIRATION RATE: 16 BRPM | HEIGHT: 62 IN | WEIGHT: 207 LBS | TEMPERATURE: 37.1 F | HEART RATE: 59 BPM

## 2018-03-14 DIAGNOSIS — J45.20 MILD INTERMITTENT ASTHMA WITHOUT COMPLICATION: ICD-10-CM

## 2018-03-14 DIAGNOSIS — R26.2 DISABILITY OF WALKING: ICD-10-CM

## 2018-03-14 DIAGNOSIS — Z12.11 SCREEN FOR COLON CANCER: ICD-10-CM

## 2018-03-14 DIAGNOSIS — I10 ESSENTIAL HYPERTENSION: ICD-10-CM

## 2018-03-14 DIAGNOSIS — F11.20 UNCOMPLICATED OPIOID DEPENDENCE (HCC): ICD-10-CM

## 2018-03-14 DIAGNOSIS — I89.0 LYMPHEDEMA: ICD-10-CM

## 2018-03-14 DIAGNOSIS — M16.11 PRIMARY OSTEOARTHRITIS OF RIGHT HIP: ICD-10-CM

## 2018-03-14 DIAGNOSIS — E66.01 OBESITY, MORBID, BMI 40.0-49.9 (HCC): ICD-10-CM

## 2018-03-14 PROBLEM — R22.42 MASS OF LEFT LOWER LEG: Status: RESOLVED | Noted: 2018-01-22 | Resolved: 2018-03-14

## 2018-03-14 PROCEDURE — 7101 PR PHYSICAL: Performed by: INTERNAL MEDICINE

## 2018-03-14 ASSESSMENT — PAIN SCALES - GENERAL: PAINLEVEL: NO PAIN

## 2018-03-14 NOTE — TELEPHONE ENCOUNTER
Surgical clearance from a Vascular standpoint faxed to Dr. Levine at the [facility].    [facility]

## 2018-03-15 ENCOUNTER — PATIENT MESSAGE (OUTPATIENT)
Dept: MEDICAL GROUP | Age: 53
End: 2018-03-15

## 2018-03-15 ASSESSMENT — ENCOUNTER SYMPTOMS
PSYCHIATRIC NEGATIVE: 1
CONSTITUTIONAL NEGATIVE: 1
CARDIOVASCULAR NEGATIVE: 1
BACK PAIN: 1
RESPIRATORY NEGATIVE: 1
EYES NEGATIVE: 1
GASTROINTESTINAL NEGATIVE: 1
NEUROLOGICAL NEGATIVE: 1

## 2018-03-16 ENCOUNTER — TELEPHONE (OUTPATIENT)
Dept: MEDICAL GROUP | Age: 53
End: 2018-03-16

## 2018-03-16 PROBLEM — L03.115 CELLULITIS OF RIGHT LOWER EXTREMITY: Status: RESOLVED | Noted: 2018-02-28 | Resolved: 2018-03-16

## 2018-03-16 NOTE — TELEPHONE ENCOUNTER
Spoke with pt who had concerns about disability reimbursement as she had a paper that needed to be signed. I gave her my email [email or web address] and told her to send what she needed signed if she could not get to a fax or drop it off in time

## 2018-03-16 NOTE — TELEPHONE ENCOUNTER
I DONT KNOW; PLEASE FIND OUT AND LET PT KNOW. I DID CLEAR HER FOR SURGERY; I ASSUME THE CLEARANCE LETTER I SIGNED GOT SENT

## 2018-03-16 NOTE — PROGRESS NOTES
Subjective:      Karine Ovalle is a 52 y.o. female who presents with Medical Clearance (hip replacement)  AND  The patient is here for followup of chronic medical problems listed below. The patient is compliant with medications and having no side effects from them. Denies chest pain, abdominal pain, dyspnea, myalgias, or cough.     AND DISABILITY EVAL AND  PAPER WORK FILL OUT  Patient Active Problem List    Diagnosis Date Noted   • Mild intermittent asthma without complication 04/25/2012     Priority: High   • Cellulitis of right lower extremity 02/28/2018   • Disability of walking- due to severe bilateral hip DJD- needs bilat THR- placed on full disability 2/5/18-2/5/19 02/05/2018   • Uncomplicated opioid dependence (CMS-[facility])- pcp dr hackett to rx 01/31/2018   • Essential hypertension 01/31/2018   • Primary osteoarthritis of right hip- severe; need THR; REF ORTHO DR JERRY 01/22/2018   • Lymphedema 10/17/2017   • Debility- FMLA through april 2018 10/12/2017   • Venous insufficiency 10/03/2017   • Colon cancer screening- needs 08/09/2017   • Chronic bilateral low back pain with bilateral sciatica- pain mgt 07/26/2017   • Obesity, morbid, BMI 40.0-49.9 (CMS-HCC)- s/p gastric bypass 10/21/2016   • Vitamin B 12 deficiency 06/02/2016   • Fibroids 08/13/2013     Zofran [dextrose-ondansetron]  Outpatient Medications Prior to Visit   Medication Sig Dispense Refill   • Ibuprofen 200 MG Cap Take 2 Caps by mouth as needed.     • morphine ER (MS CONTIN) 30 MG Tab CR tablet Take 1 Tab by mouth every 12 hours for 30 days. 60 Tab 0   • mupirocin (BACTROBAN) 2 % Ointment Apply 1 Application to affected area(s) every day. 1 Tube 1   • sulfamethoxazole-trimethoprim (BACTRIM DS) 800-160 MG tablet Take 1 Tab by mouth 2 times a day. 28 Tab 1   • oxyCODONE-acetaminophen (PERCOCET-10)  MG Tab Take 1 Tab by mouth every 6 hours as needed for Severe Pain for up to 30 days. 120 Tab 0   • Misc. Devices Misc Front wheeled walker with  "attached seat 1 Each 0   • metaxalone (SKELAXIN) 800 MG Tab Take 1 Tab by mouth 3 times a day for 90 days. 90 Tab 4   • omeprazole (PRILOSEC) 20 MG delayed-release capsule Take 1 Cap by mouth every day. 30 Cap 0   • furosemide (LASIX) 20 MG Tab Take 1 Tab by mouth every day. 90 Tab 4   • potassium chloride ER (KLOR-CON) 10 MEQ tablet Take 1 Tab by mouth every day. 90 Tab 4   • olopatadine (PATANOL) 0.1 % ophthalmic solution Place 1 Drop in both eyes 2 times a day. 5 mL 11   • cyanocobalamin (VITAMIN B12) 1000 MCG Tab Take 1 Tab by mouth every day. 90 Tab 4   • Lidocaine HCl 3 % Cream Apply cream to affected area 2 times daily as needed 1 Tube 0   • multivitamin (THERAGRAN) Tab Take 1 Tab by mouth every day.     • vitamin D (CHOLECALCIFEROL) 1000 UNIT Tab Take 1,000 Units by mouth every day.       No facility-administered medications prior to visit.                HPI    Review of Systems   Constitutional: Negative.    HENT: Negative.    Eyes: Negative.    Respiratory: Negative.    Cardiovascular: Negative.    Gastrointestinal: Negative.    Genitourinary: Negative.    Musculoskeletal: Positive for back pain and joint pain.   Skin: Negative.    Neurological: Negative.    Endo/Heme/Allergies: Negative.    Psychiatric/Behavioral: Negative.           Objective:     /78   Pulse (!) 59   Temp (!) 2.8 °C (37.1 °F)   Resp 16   Ht 1.575 m (5' 2\")   Wt 93.9 kg (207 lb)   LMP 05/17/2017   SpO2 100%   Breastfeeding? No   BMI 37.86 kg/m²      Physical Exam   Constitutional: She is oriented to person, place, and time. She appears well-developed and well-nourished. No distress.   HENT:   Head: Normocephalic and atraumatic.   Right Ear: External ear normal.   Left Ear: External ear normal.   Nose: Nose normal.   Mouth/Throat: Oropharynx is clear and moist. No oropharyngeal exudate.   Eyes: Conjunctivae and EOM are normal. Pupils are equal, round, and reactive to light. Right eye exhibits no discharge. Left eye " exhibits no discharge. No scleral icterus.   Neck: Normal range of motion. Neck supple. No JVD present. No tracheal deviation present. No thyromegaly present.   Cardiovascular: Normal rate, regular rhythm, normal heart sounds and intact distal pulses.  Exam reveals no gallop and no friction rub.    No murmur heard.  Pulmonary/Chest: Effort normal and breath sounds normal. No stridor. No respiratory distress. She has no wheezes. She has no rales. She exhibits no tenderness.   Abdominal: Soft. Bowel sounds are normal. She exhibits no distension and no mass. There is no tenderness. There is no rebound and no guarding.   Musculoskeletal: Normal range of motion. She exhibits edema and tenderness.   Lymphadenopathy:     She has no cervical adenopathy.   Neurological: She is alert and oriented to person, place, and time. She has normal reflexes. She displays normal reflexes. No cranial nerve deficit. She exhibits normal muscle tone. Coordination normal.   Skin: Skin is warm and dry. No rash noted. She is not diaphoretic. No erythema. No pallor.   Psychiatric: She has a normal mood and affect. Her behavior is normal. Judgment and thought content normal.   Vitals reviewed.    Hospital Outpatient Visit on 2018   Component Date Value   • Eject.Frac. MOD BP 2018 64.48    • Eject.Frac. MOD 4C 2018 70.07    • Eject.Frac. MOD 2C 2018 57.57    • Left Ventrical Ejection * 2018 60    Orders Only on 2018   Component Date Value   • Report 2018                      Value:[facility]    Test Date:  2018  Pt Name:    ANA LAWLER                  Department: [facility]  MRN:        [id number]                      Room:  Gender:     Female                       Technician: [facility]  :        1965                   Requested By:MARCIA JERRY  Order #:    [id number]                    Reading MD: Michelle Saravia MD    Measurements  Intervals                                Axis  Rate:       85                            P:          35  WY:         156                          QRS:        5  QRSD:       88                           T:          31  QT:         388  QTc:        462    Interpretive Statements  SINUS RHYTHM  Compared to ECG 08/07/2017 16:11:22  Left ventricular hypertrophy no longer present    Electronically Signed On 3-8-2018 6:07:49 PST by Michelle Saravia MD     • Sodium 03/07/2018 134*   • Potassium 03/07/2018 3.8    • Chloride 03/07/2018 101    • Co2 03/07/2018 24    • Glucose 03/07/2018 79    • Bun 03/07/2018 11    • Creatinine 03/07/2018 0.71    • Calcium 03/07/2018 9.4    • Anion Gap 03/07/2018 9.0    • WBC 03/07/2018 8.0    • RBC 03/07/2018 4.61    • Hemoglobin 03/07/2018 13.1    • Hematocrit 03/07/2018 37.8    • MCV 03/07/2018 82.0    • MCH 03/07/2018 28.4    • MCHC 03/07/2018 34.7    • RDW 03/07/2018 45.0    • Platelet Count 03/07/2018 327    • MPV 03/07/2018 9.8    • Neutrophils-Polys 03/07/2018 53.90    • Lymphocytes 03/07/2018 28.50    • Monocytes 03/07/2018 7.50    • Eosinophils 03/07/2018 9.10*   • Basophils 03/07/2018 0.90    • Immature Granulocytes 03/07/2018 0.10    • Nucleated RBC 03/07/2018 0.00    • Neutrophils (Absolute) 03/07/2018 4.34    • Lymphs (Absolute) 03/07/2018 2.29    • Monos (Absolute) 03/07/2018 0.60    • Eos (Absolute) 03/07/2018 0.73*   • Baso (Absolute) 03/07/2018 0.07    • Immature Granulocytes (a* 03/07/2018 0.01    • NRBC (Absolute) 03/07/2018 0.00    • Color 03/07/2018 Yellow    • Character 03/07/2018 Clear    • Specific Gravity 03/07/2018 1.013    • Ph 03/07/2018 5.5    • Glucose 03/07/2018 Negative    • Ketones 03/07/2018 Negative    • Protein 03/07/2018 Negative    • Bilirubin 03/07/2018 Negative    • Urobilinogen, Urine 03/07/2018 1.0    • Nitrite 03/07/2018 Negative    • Leukocyte Esterase 03/07/2018 Negative    • Occult Blood 03/07/2018 Negative    • Micro Urine Req 03/07/2018 see below    • Culture Indicated 03/07/2018 No    • Staph aureus by PCR  03/07/2018 Negative    • MRSA by PCR 03/07/2018 Negative    • GFR If  03/07/2018 >60    • GFR If Non  Ameri* 03/07/2018 >60    Hospital Outpatient Visit on 02/22/2018   Component Date Value   • Cholesterol,Tot 02/22/2018 168    • Triglycerides 02/22/2018 85    • HDL 02/22/2018 67    • LDL 02/22/2018 84    • Sodium 02/22/2018 136    • Potassium 02/22/2018 3.5*   • Chloride 02/22/2018 102    • Co2 02/22/2018 25    • Anion Gap 02/22/2018 9.0    • Glucose 02/22/2018 82    • Bun 02/22/2018 16    • Creatinine 02/22/2018 0.62    • Calcium 02/22/2018 9.2    • AST(SGOT) 02/22/2018 16    • ALT(SGPT) 02/22/2018 8    • Alkaline Phosphatase 02/22/2018 90    • Total Bilirubin 02/22/2018 0.4    • Albumin 02/22/2018 3.7    • Total Protein 02/22/2018 7.2    • Globulin 02/22/2018 3.5    • A-G Ratio 02/22/2018 1.1    • Creatinine, Urine 02/22/2018 106.00    • Microalbumin, Urine Rand* 02/22/2018 <0.7    • Micro Alb Creat Ratio 02/22/2018 see below    • B Natriuretic Peptide 02/22/2018 47    • GFR If  02/22/2018 >60    • GFR If Non  Ameri* 02/22/2018 >60       Lab Results   Component Value Date/Time    HBA1C 5.3 06/12/2017 09:03 AM     Lab Results   Component Value Date/Time    SODIUM 134 (L) 03/07/2018 05:50 PM    POTASSIUM 3.8 03/07/2018 05:50 PM    CHLORIDE 101 03/07/2018 05:50 PM    CO2 24 03/07/2018 05:50 PM    GLUCOSE 79 03/07/2018 05:50 PM    BUN 11 03/07/2018 05:50 PM    CREATININE 0.71 03/07/2018 05:50 PM    CREATININE 0.88 08/14/2010 12:00 AM    BUNCREATRAT 13 08/14/2010 12:00 AM    GLOMRATE >59 08/14/2010 12:00 AM    ALKPHOSPHAT 90 02/22/2018 05:05 PM    ASTSGOT 16 02/22/2018 05:05 PM    ALTSGPT 8 02/22/2018 05:05 PM    TBILIRUBIN 0.4 02/22/2018 05:05 PM     Lab Results   Component Value Date/Time    INR 1.04 05/18/2012 12:20 PM     Lab Results   Component Value Date/Time    CHOLSTRLTOT 168 02/22/2018 05:05 PM    LDL 84 02/22/2018 05:05 PM    HDL 67 02/22/2018 05:05 PM     TRIGLYCERIDE 85 02/22/2018 05:05 PM       No results found for: TESTOSTERONE  Lab Results   Component Value Date/Time    TSH 2.110 08/14/2010 12:00 AM     Lab Results   Component Value Date/Time    FREET4 0.72 05/03/2016 07:32 AM     Lab Results   Component Value Date/Time    URICACID 5.6 08/12/2014 04:49 PM     No components found for: VITB12  Lab Results   Component Value Date/Time    25HYDROXY 33 08/12/2014 04:49 PM    25HYDROXY 25 (L) 07/03/2012 09:20 AM               Assessment/Plan:     1. Primary osteoarthritis of right hip- severe; need THR; REF ORTHO DR JERRY       PT CLEARED FOR SURGERY     2. Disability of walking- due to severe bilateral hip DJD- needs bilat THR- placed on full disability 2/5/18-2/5/19    FORMES FILLED FOR 2 MONTHS TOTAL AND COMPLETE DISABILITY DUE TO UPCOMING RT THR    3. Essential hypertension    Under good control. Continue same regimen.      4. Obesity, morbid, BMI 40.0-49.9 (CMS-HCC)- s/p gastric bypass     diet/exercise/lose 15 lbs.; patient counseled      5. Mild intermittent asthma without complication   Under good control. Continue same regimen.      6. Uncomplicated opioid dependence (CMS-HCC)- pcp dr hackett to rx    Under good control. Continue same regimen.    7. Lymphedema    Under good control. Continue same regimen.  8. Screen for colon cancer     - REFERRAL TO GI FOR COLONOSCOPY  - OCCULT BLOOD FECES IMMUNOASSAY (FIT); Future    40 minute face-to-face encounter took place today.  More than half of this time was spent in the coordination of care of the above problems, as well as counseling.

## 2018-03-16 NOTE — TELEPHONE ENCOUNTER
Dr. Rubin,  Please on dictation or RX clear patient for surgery if you see fit.  The sx is scheduled for the 20th with Dr. Levine at [facility].

## 2018-03-16 NOTE — LETTER
PROCEDURE/SURGERY CLEARANCE FORM      Encounter Date: 3/16/2018    Patient: Karine Ovalle  YOB: 1965     REFERRING DOCTOR:  MARCIA JERRY MD, [facility]     The above patient is cleared to have the following procedure/surgery: RIGHT HIP REPLACEMENT                                         Additional comments:                  MD Shankar Rubin M.D.       I cannot release the above patient for the following procedure/surgery without a cardiology evaluation:                                MD Shankar Rubin M.D.

## 2018-03-16 NOTE — TELEPHONE ENCOUNTER
Faxed to Rhonda at Dr. Levine's  center to [phone]    Rhonda told it was coming and asked to call us if not received in the next half hour. Scanned into media manager also

## 2018-03-17 NOTE — H&P
DATE OF SURGERY:  03/20/2018    CHIEF COMPLAINT:  Right knee pain.    HISTORY OF PRESENT ILLNESS:  The patient is a pleasant 52-year-old female who   has become significantly disabled with degenerative arthritis of her right hip   over the past 2 weeks to the point where she is wheelchair bound.  She has   had to stop going to work and she is quite uncomfortable.  She is coming in   today for a right hip replacement.    PAST MEDICAL HISTORY:  Significant for obesity, chronic pain, severe   degenerative arthritis.  She has had a right knee replacement done in the past   which she did well.    She does not have a history of diabetes or any known coronary artery disease.    CURRENT MEDICATIONS:  Morphine, oxycodone, Skelaxin, Lyrica, Prilosec, Motrin,   Lasix, potassium, Patanol, vitamin B12, and vitamin D.    ALLERGIES:  No known drug allergies.    SOCIAL HISTORY:  She does live locally.  I do not believe she has got help at   home.  She has now had to be off work because of severe debility and will   probably need to stay in a skilled nursing unit postoperatively.    PHYSICAL EXAMINATION:  GENERAL:  She is alert and oriented and responds appropriately.  She is in a   mild amount of distress.  VITAL SIGNS:  Her BMI is in the obese range.  MUSCULOSKELETAL:  She is really unable to get up and move around much at all   out of the wheelchair.  Any attempts at range of motion of the right hip   causes exquisite pain in the groin and down into the leg.  Her right knee   looks fine.  It has a well-healed incision, seems to be functioning well with   significant difficulty to examine that because of the superior hip pain.  Her   left hip moves reasonably well, but also has some pain with range of motion.    Lower extremities are swollen, but there is no skin breakdown or ulcerations.    X-RAYS:  AP pelvis and views of her right hip show severe degenerative   arthritis of right hip with collapse of the femoral head and  bone-on-bone   arthritis with peripheral osteophytes.    IMPRESSION:  1.  Severe osteoarthritis, right hip.  2.  Obesity.  3.  Chronic pain and immobility.    PLAN:  Right total hip arthroplasty.  She does understand she is at increased   risk candidate because of her size and her limited mobility, but she really is   not going to be able to get up around until her hip is taken care of, so she   understands all that and will be proceeding with hip placement.       ____________________________________     MD DK LUX / RICHARD    DD:  03/17/2018 10:45:11  DT:  03/17/2018 11:54:14    D#:  [id number]  Job#:  [id number]

## 2018-03-17 NOTE — H&P
DATE OF SURGERY:  03/20/2018    CHIEF COMPLAINT:  Right knee pain.    HISTORY OF PRESENT ILLNESS:  The patient is a pleasant 52-year-old female who   has become significantly disabled with degenerative arthritis of her right hip   over the past 2 weeks to the point where she is wheelchair bound.  She has   had to stop going to work and she is quite uncomfortable.  She is coming in   today for a right hip replacement.    PAST MEDICAL HISTORY:  Significant for obesity, chronic pain, severe   degenerative arthritis.  She has had a right knee replacement done in the past   which she did well.    She does not have a history of diabetes or any known coronary artery disease.    CURRENT MEDICATIONS:  Morphine, oxycodone, Skelaxin, Lyrica, Prilosec, Motrin,   Lasix, potassium, Patanol, vitamin B12, and vitamin D.    ALLERGIES:  No known drug allergies.    SOCIAL HISTORY:  She does live locally.  I do not believe she has got help at   home.  She has now had to be off work because of severe debility and will   probably need to stay in a skilled nursing unit postoperatively.    PHYSICAL EXAMINATION:  GENERAL:  She is alert and oriented and responds appropriately.  She is in a   mild amount of distress.  VITAL SIGNS:  Her BMI is in the obese range.  MUSCULOSKELETAL:  She is really unable to get up and move around much at all   out of the wheelchair.  Any attempts at range of motion of the right hip   causes exquisite pain in the groin and down into the leg.  Her right knee   looks fine.  It has a well-healed incision, seems to be functioning well with   significant difficulty to examine that because of the superior hip pain.  Her   left hip moves reasonably well, but also has some pain with range of motion.    Lower extremities are swollen, but there is no skin breakdown or ulcerations.    X-RAYS:  AP pelvis and views of her right hip show severe degenerative   arthritis of right hip with collapse of the femoral head and  bone-on-bone   arthritis with peripheral osteophytes.    IMPRESSION:  1.  Severe osteoarthritis, right hip.  2.  Obesity.  3.  Chronic pain and immobility.    PLAN:  Right total hip arthroplasty.  She does understand she is at increased   risk candidate because of her size and her limited mobility, but she really is   not going to be able to get up around until her hip is taken care of, so she   understands all that and will be proceeding with hip placement.       ____________________________________     MD DK LUX / RICHARD    DD:  03/17/2018 10:45:11  DT:  03/17/2018 11:54:14    D#:  [id number]  Job#:  [id number]

## 2018-03-19 DIAGNOSIS — M54.42 CHRONIC BILATERAL LOW BACK PAIN WITH BILATERAL SCIATICA: ICD-10-CM

## 2018-03-19 DIAGNOSIS — G89.29 CHRONIC BILATERAL LOW BACK PAIN WITH BILATERAL SCIATICA: ICD-10-CM

## 2018-03-19 DIAGNOSIS — M54.41 CHRONIC BILATERAL LOW BACK PAIN WITH BILATERAL SCIATICA: ICD-10-CM

## 2018-03-19 DIAGNOSIS — F11.20 UNCOMPLICATED OPIOID DEPENDENCE (HCC): ICD-10-CM

## 2018-03-19 DIAGNOSIS — M16.11 PRIMARY OSTEOARTHRITIS OF RIGHT HIP: ICD-10-CM

## 2018-03-19 RX ORDER — MORPHINE SULFATE 30 MG/1
30 TABLET, FILM COATED, EXTENDED RELEASE ORAL EVERY 12 HOURS
Qty: 60 TAB | Refills: 0 | Status: ON HOLD | OUTPATIENT
Start: 2018-03-28 | End: 2018-03-23

## 2018-03-19 RX ORDER — OXYCODONE AND ACETAMINOPHEN 10; 325 MG/1; MG/1
1 TABLET ORAL EVERY 6 HOURS PRN
Qty: 120 TAB | Refills: 0 | Status: ON HOLD | OUTPATIENT
Start: 2018-03-28 | End: 2018-03-23

## 2018-03-19 RX ORDER — CEFAZOLIN SODIUM 2 G/100ML
2 INJECTION, SOLUTION INTRAVENOUS ONCE
Status: COMPLETED | OUTPATIENT
Start: 2018-03-20 | End: 2018-03-20

## 2018-03-20 ENCOUNTER — HOSPITAL ENCOUNTER (INPATIENT)
Facility: MEDICAL CENTER | Age: 53
LOS: 3 days | DRG: 470 | End: 2018-03-23
Attending: ORTHOPAEDIC SURGERY | Admitting: ORTHOPAEDIC SURGERY
Payer: COMMERCIAL

## 2018-03-20 ENCOUNTER — APPOINTMENT (OUTPATIENT)
Dept: RADIOLOGY | Facility: MEDICAL CENTER | Age: 53
DRG: 470 | End: 2018-03-20
Attending: ORTHOPAEDIC SURGERY
Payer: COMMERCIAL

## 2018-03-20 ENCOUNTER — APPOINTMENT (OUTPATIENT)
Dept: RADIOLOGY | Facility: MEDICAL CENTER | Age: 53
DRG: 470 | End: 2018-03-20
Attending: PHYSICIAN ASSISTANT
Payer: COMMERCIAL

## 2018-03-20 DIAGNOSIS — Z96.641 STATUS POST RIGHT HIP REPLACEMENT: ICD-10-CM

## 2018-03-20 DIAGNOSIS — R26.2 DISABILITY OF WALKING: ICD-10-CM

## 2018-03-20 DIAGNOSIS — M54.42 CHRONIC BILATERAL LOW BACK PAIN WITH BILATERAL SCIATICA: ICD-10-CM

## 2018-03-20 DIAGNOSIS — M54.41 CHRONIC BILATERAL LOW BACK PAIN WITH BILATERAL SCIATICA: ICD-10-CM

## 2018-03-20 DIAGNOSIS — F11.20 UNCOMPLICATED OPIOID DEPENDENCE (HCC): ICD-10-CM

## 2018-03-20 DIAGNOSIS — G89.29 CHRONIC BILATERAL LOW BACK PAIN WITH BILATERAL SCIATICA: ICD-10-CM

## 2018-03-20 DIAGNOSIS — G89.18 ACUTE POST-OPERATIVE PAIN: ICD-10-CM

## 2018-03-20 DIAGNOSIS — M16.11 PRIMARY OSTEOARTHRITIS OF RIGHT HIP: ICD-10-CM

## 2018-03-20 PROCEDURE — 160036 HCHG PACU - EA ADDL 30 MINS PHASE I: Performed by: ORTHOPAEDIC SURGERY

## 2018-03-20 PROCEDURE — A9270 NON-COVERED ITEM OR SERVICE: HCPCS | Performed by: PHYSICIAN ASSISTANT

## 2018-03-20 PROCEDURE — 700111 HCHG RX REV CODE 636 W/ 250 OVERRIDE (IP)

## 2018-03-20 PROCEDURE — 700111 HCHG RX REV CODE 636 W/ 250 OVERRIDE (IP): Performed by: ORTHOPAEDIC SURGERY

## 2018-03-20 PROCEDURE — 700105 HCHG RX REV CODE 258: Performed by: ORTHOPAEDIC SURGERY

## 2018-03-20 PROCEDURE — 700111 HCHG RX REV CODE 636 W/ 250 OVERRIDE (IP): Performed by: PHYSICIAN ASSISTANT

## 2018-03-20 PROCEDURE — 700101 HCHG RX REV CODE 250

## 2018-03-20 PROCEDURE — 160035 HCHG PACU - 1ST 60 MINS PHASE I: Performed by: ORTHOPAEDIC SURGERY

## 2018-03-20 PROCEDURE — 700101 HCHG RX REV CODE 250: Performed by: PHYSICIAN ASSISTANT

## 2018-03-20 PROCEDURE — 160031 HCHG SURGERY MINUTES - 1ST 30 MINS LEVEL 5: Performed by: ORTHOPAEDIC SURGERY

## 2018-03-20 PROCEDURE — 700102 HCHG RX REV CODE 250 W/ 637 OVERRIDE(OP): Performed by: PHYSICIAN ASSISTANT

## 2018-03-20 PROCEDURE — 770001 HCHG ROOM/CARE - MED/SURG/GYN PRIV*

## 2018-03-20 PROCEDURE — 160048 HCHG OR STATISTICAL LEVEL 1-5: Performed by: ORTHOPAEDIC SURGERY

## 2018-03-20 PROCEDURE — A9270 NON-COVERED ITEM OR SERVICE: HCPCS

## 2018-03-20 PROCEDURE — 501445 HCHG STAPLER, SKIN DISP: Performed by: ORTHOPAEDIC SURGERY

## 2018-03-20 PROCEDURE — 160042 HCHG SURGERY MINUTES - EA ADDL 1 MIN LEVEL 5: Performed by: ORTHOPAEDIC SURGERY

## 2018-03-20 PROCEDURE — 700102 HCHG RX REV CODE 250 W/ 637 OVERRIDE(OP)

## 2018-03-20 PROCEDURE — 502000 HCHG MISC OR IMPLANTS RC 0278: Performed by: ORTHOPAEDIC SURGERY

## 2018-03-20 PROCEDURE — 501838 HCHG SUTURE GENERAL: Performed by: ORTHOPAEDIC SURGERY

## 2018-03-20 PROCEDURE — 160002 HCHG RECOVERY MINUTES (STAT): Performed by: ORTHOPAEDIC SURGERY

## 2018-03-20 PROCEDURE — 700101 HCHG RX REV CODE 250: Performed by: ORTHOPAEDIC SURGERY

## 2018-03-20 PROCEDURE — 160009 HCHG ANES TIME/MIN: Performed by: ORTHOPAEDIC SURGERY

## 2018-03-20 PROCEDURE — 72170 X-RAY EXAM OF PELVIS: CPT

## 2018-03-20 PROCEDURE — 502578 HCHG PACK, TOTAL HIP: Performed by: ORTHOPAEDIC SURGERY

## 2018-03-20 PROCEDURE — 0SR906A REPLACEMENT OF RIGHT HIP JOINT WITH OXIDIZED ZIRCONIUM ON POLYETHYLENE SYNTHETIC SUBSTITUTE, UNCEMENTED, OPEN APPROACH: ICD-10-PCS | Performed by: ORTHOPAEDIC SURGERY

## 2018-03-20 PROCEDURE — 700105 HCHG RX REV CODE 258: Performed by: PHYSICIAN ASSISTANT

## 2018-03-20 PROCEDURE — 501480 HCHG STOCKINETTE: Performed by: ORTHOPAEDIC SURGERY

## 2018-03-20 PROCEDURE — 700112 HCHG RX REV CODE 229: Performed by: PHYSICIAN ASSISTANT

## 2018-03-20 DEVICE — IMPLANT REF SPHER HEAD SCREW 15MM (1EA): Type: IMPLANTABLE DEVICE | Status: FUNCTIONAL

## 2018-03-20 DEVICE — IMPLANTABLE DEVICE: Type: IMPLANTABLE DEVICE | Status: FUNCTIONAL

## 2018-03-20 DEVICE — IMPLANT R3 0 DEG XLPE ACET LNR 32MM X 48MM (1EA): Type: IMPLANTABLE DEVICE | Status: FUNCTIONAL

## 2018-03-20 DEVICE — IMPLANT OXINIUM FEM HD 12/14 32MM +0 (1EA): Type: IMPLANTABLE DEVICE | Status: FUNCTIONAL

## 2018-03-20 DEVICE — IMPLANT REF SPHER HEAD SCREW 25MM (1EA): Type: IMPLANTABLE DEVICE | Status: FUNCTIONAL

## 2018-03-20 DEVICE — IMPLANT REF SPHER HEAD SCREW 40MM (1EA): Type: IMPLANTABLE DEVICE | Status: FUNCTIONAL

## 2018-03-20 RX ORDER — KETOROLAC TROMETHAMINE 30 MG/ML
15 INJECTION, SOLUTION INTRAMUSCULAR; INTRAVENOUS EVERY 6 HOURS
Status: COMPLETED | OUTPATIENT
Start: 2018-03-20 | End: 2018-03-22

## 2018-03-20 RX ORDER — OXYCODONE HYDROCHLORIDE 10 MG/1
10 TABLET ORAL EVERY 4 HOURS PRN
Status: DISCONTINUED | OUTPATIENT
Start: 2018-03-20 | End: 2018-03-23 | Stop reason: HOSPADM

## 2018-03-20 RX ORDER — OMEPRAZOLE 20 MG/1
20 CAPSULE, DELAYED RELEASE ORAL
Status: DISCONTINUED | OUTPATIENT
Start: 2018-03-20 | End: 2018-03-23 | Stop reason: HOSPADM

## 2018-03-20 RX ORDER — POTASSIUM CHLORIDE 750 MG/1
10 TABLET, FILM COATED, EXTENDED RELEASE ORAL DAILY
Status: DISCONTINUED | OUTPATIENT
Start: 2018-03-20 | End: 2018-03-20

## 2018-03-20 RX ORDER — SODIUM CHLORIDE 9 MG/ML
INJECTION, SOLUTION INTRAVENOUS CONTINUOUS
Status: DISCONTINUED | OUTPATIENT
Start: 2018-03-20 | End: 2018-03-23 | Stop reason: HOSPADM

## 2018-03-20 RX ORDER — OXYCODONE HCL 10 MG/1
TABLET, FILM COATED, EXTENDED RELEASE ORAL
Status: DISPENSED
Start: 2018-03-20 | End: 2018-03-20

## 2018-03-20 RX ORDER — TRAMADOL HYDROCHLORIDE 50 MG/1
50 TABLET ORAL EVERY 4 HOURS PRN
Status: DISCONTINUED | OUTPATIENT
Start: 2018-03-20 | End: 2018-03-23 | Stop reason: HOSPADM

## 2018-03-20 RX ORDER — DIPHENHYDRAMINE HYDROCHLORIDE 50 MG/ML
25 INJECTION INTRAMUSCULAR; INTRAVENOUS EVERY 6 HOURS PRN
Status: DISCONTINUED | OUTPATIENT
Start: 2018-03-20 | End: 2018-03-23 | Stop reason: HOSPADM

## 2018-03-20 RX ORDER — AMOXICILLIN 250 MG
1 CAPSULE ORAL NIGHTLY
Status: DISCONTINUED | OUTPATIENT
Start: 2018-03-20 | End: 2018-03-23 | Stop reason: HOSPADM

## 2018-03-20 RX ORDER — AMOXICILLIN 250 MG
1 CAPSULE ORAL
Status: DISCONTINUED | OUTPATIENT
Start: 2018-03-20 | End: 2018-03-23 | Stop reason: HOSPADM

## 2018-03-20 RX ORDER — LIDOCAINE HYDROCHLORIDE 10 MG/ML
0.5 INJECTION, SOLUTION INFILTRATION; PERINEURAL
Status: ACTIVE | OUTPATIENT
Start: 2018-03-20 | End: 2018-03-21

## 2018-03-20 RX ORDER — DIAZEPAM 5 MG/1
TABLET ORAL
Status: COMPLETED
Start: 2018-03-20 | End: 2018-03-20

## 2018-03-20 RX ORDER — PROCHLORPERAZINE MALEATE 10 MG
10 TABLET ORAL EVERY 6 HOURS PRN
Status: DISCONTINUED | OUTPATIENT
Start: 2018-03-20 | End: 2018-03-23 | Stop reason: HOSPADM

## 2018-03-20 RX ORDER — OXYCODONE HYDROCHLORIDE 5 MG/1
5 TABLET ORAL EVERY 4 HOURS PRN
Status: DISCONTINUED | OUTPATIENT
Start: 2018-03-20 | End: 2018-03-23 | Stop reason: HOSPADM

## 2018-03-20 RX ORDER — ZOLPIDEM TARTRATE 5 MG/1
5 TABLET ORAL NIGHTLY PRN
Status: DISCONTINUED | OUTPATIENT
Start: 2018-03-21 | End: 2018-03-23 | Stop reason: HOSPADM

## 2018-03-20 RX ORDER — LIDOCAINE AND PRILOCAINE 25; 25 MG/G; MG/G
1 CREAM TOPICAL
Status: ACTIVE | OUTPATIENT
Start: 2018-03-20 | End: 2018-03-21

## 2018-03-20 RX ORDER — ACETAMINOPHEN 500 MG
TABLET ORAL
Status: DISPENSED
Start: 2018-03-20 | End: 2018-03-20

## 2018-03-20 RX ORDER — FUROSEMIDE 20 MG/1
20 TABLET ORAL DAILY
Status: DISCONTINUED | OUTPATIENT
Start: 2018-03-21 | End: 2018-03-23 | Stop reason: HOSPADM

## 2018-03-20 RX ORDER — POTASSIUM CHLORIDE 20 MEQ/1
10 TABLET, EXTENDED RELEASE ORAL DAILY
Status: DISCONTINUED | OUTPATIENT
Start: 2018-03-20 | End: 2018-03-23 | Stop reason: HOSPADM

## 2018-03-20 RX ORDER — MIDAZOLAM HYDROCHLORIDE 1 MG/ML
INJECTION INTRAMUSCULAR; INTRAVENOUS
Status: DISPENSED
Start: 2018-03-20 | End: 2018-03-20

## 2018-03-20 RX ORDER — KETOROLAC TROMETHAMINE 30 MG/ML
INJECTION, SOLUTION INTRAMUSCULAR; INTRAVENOUS
Status: DISCONTINUED | OUTPATIENT
Start: 2018-03-20 | End: 2018-03-20 | Stop reason: HOSPADM

## 2018-03-20 RX ORDER — ENEMA 19; 7 G/133ML; G/133ML
1 ENEMA RECTAL
Status: DISCONTINUED | OUTPATIENT
Start: 2018-03-20 | End: 2018-03-23 | Stop reason: HOSPADM

## 2018-03-20 RX ORDER — DEXAMETHASONE SODIUM PHOSPHATE 4 MG/ML
4 INJECTION, SOLUTION INTRA-ARTICULAR; INTRALESIONAL; INTRAMUSCULAR; INTRAVENOUS; SOFT TISSUE
Status: DISCONTINUED | OUTPATIENT
Start: 2018-03-20 | End: 2018-03-23 | Stop reason: HOSPADM

## 2018-03-20 RX ORDER — CHLORPROMAZINE HYDROCHLORIDE 25 MG/ML
25 INJECTION INTRAMUSCULAR EVERY 6 HOURS PRN
Status: DISCONTINUED | OUTPATIENT
Start: 2018-03-20 | End: 2018-03-23 | Stop reason: HOSPADM

## 2018-03-20 RX ORDER — CEFAZOLIN SODIUM 2 G/100ML
2 INJECTION, SOLUTION INTRAVENOUS EVERY 8 HOURS
Status: COMPLETED | OUTPATIENT
Start: 2018-03-20 | End: 2018-03-21

## 2018-03-20 RX ORDER — LIDOCAINE HYDROCHLORIDE 10 MG/ML
INJECTION, SOLUTION INFILTRATION; PERINEURAL
Status: DISPENSED
Start: 2018-03-20 | End: 2018-03-20

## 2018-03-20 RX ORDER — MAGNESIUM HYDROXIDE 1200 MG/15ML
LIQUID ORAL
Status: DISCONTINUED | OUTPATIENT
Start: 2018-03-20 | End: 2018-03-20 | Stop reason: HOSPADM

## 2018-03-20 RX ORDER — POLYETHYLENE GLYCOL 3350 17 G/17G
1 POWDER, FOR SOLUTION ORAL 2 TIMES DAILY PRN
Status: DISCONTINUED | OUTPATIENT
Start: 2018-03-20 | End: 2018-03-23 | Stop reason: HOSPADM

## 2018-03-20 RX ORDER — DIPHENHYDRAMINE HCL 25 MG
25 TABLET ORAL EVERY 6 HOURS PRN
Status: DISCONTINUED | OUTPATIENT
Start: 2018-03-20 | End: 2018-03-23 | Stop reason: HOSPADM

## 2018-03-20 RX ORDER — HYDROMORPHONE HYDROCHLORIDE 2 MG/ML
INJECTION, SOLUTION INTRAMUSCULAR; INTRAVENOUS; SUBCUTANEOUS
Status: DISPENSED
Start: 2018-03-20 | End: 2018-03-20

## 2018-03-20 RX ORDER — METAXALONE 800 MG/1
800 TABLET ORAL 3 TIMES DAILY
Status: DISCONTINUED | OUTPATIENT
Start: 2018-03-20 | End: 2018-03-23 | Stop reason: HOSPADM

## 2018-03-20 RX ORDER — SODIUM CHLORIDE, SODIUM LACTATE, POTASSIUM CHLORIDE, CALCIUM CHLORIDE 600; 310; 30; 20 MG/100ML; MG/100ML; MG/100ML; MG/100ML
INJECTION, SOLUTION INTRAVENOUS CONTINUOUS
Status: DISCONTINUED | OUTPATIENT
Start: 2018-03-20 | End: 2018-03-23 | Stop reason: HOSPADM

## 2018-03-20 RX ORDER — ACETAMINOPHEN 650 MG
TABLET, EXTENDED RELEASE ORAL
Status: DISCONTINUED | OUTPATIENT
Start: 2018-03-20 | End: 2018-03-20 | Stop reason: HOSPADM

## 2018-03-20 RX ORDER — TAMSULOSIN HYDROCHLORIDE 0.4 MG/1
0.4 CAPSULE ORAL DAILY
Status: DISCONTINUED | OUTPATIENT
Start: 2018-03-20 | End: 2018-03-23 | Stop reason: HOSPADM

## 2018-03-20 RX ORDER — MORPHINE SULFATE 30 MG/1
30 TABLET, FILM COATED, EXTENDED RELEASE ORAL EVERY 12 HOURS
Status: DISCONTINUED | OUTPATIENT
Start: 2018-03-20 | End: 2018-03-23 | Stop reason: HOSPADM

## 2018-03-20 RX ORDER — BUPIVACAINE HYDROCHLORIDE AND EPINEPHRINE 5; 5 MG/ML; UG/ML
INJECTION, SOLUTION EPIDURAL; INTRACAUDAL; PERINEURAL
Status: DISCONTINUED | OUTPATIENT
Start: 2018-03-20 | End: 2018-03-20 | Stop reason: HOSPADM

## 2018-03-20 RX ORDER — HALOPERIDOL 5 MG/ML
1 INJECTION INTRAMUSCULAR EVERY 6 HOURS PRN
Status: DISCONTINUED | OUTPATIENT
Start: 2018-03-20 | End: 2018-03-23 | Stop reason: HOSPADM

## 2018-03-20 RX ORDER — KETOROLAC TROMETHAMINE 30 MG/ML
INJECTION, SOLUTION INTRAMUSCULAR; INTRAVENOUS
Status: DISPENSED
Start: 2018-03-20 | End: 2018-03-20

## 2018-03-20 RX ORDER — DEXAMETHASONE SODIUM PHOSPHATE 4 MG/ML
8 INJECTION, SOLUTION INTRA-ARTICULAR; INTRALESIONAL; INTRAMUSCULAR; INTRAVENOUS; SOFT TISSUE ONCE
Status: COMPLETED | OUTPATIENT
Start: 2018-03-21 | End: 2018-03-21

## 2018-03-20 RX ORDER — ACETAMINOPHEN 500 MG
1000 TABLET ORAL EVERY 6 HOURS
Status: DISCONTINUED | OUTPATIENT
Start: 2018-03-20 | End: 2018-03-23 | Stop reason: HOSPADM

## 2018-03-20 RX ORDER — DOCUSATE SODIUM 100 MG/1
100 CAPSULE, LIQUID FILLED ORAL 2 TIMES DAILY
Status: DISCONTINUED | OUTPATIENT
Start: 2018-03-20 | End: 2018-03-23 | Stop reason: HOSPADM

## 2018-03-20 RX ORDER — SCOLOPAMINE TRANSDERMAL SYSTEM 1 MG/1
1 PATCH, EXTENDED RELEASE TRANSDERMAL
Status: DISCONTINUED | OUTPATIENT
Start: 2018-03-20 | End: 2018-03-23 | Stop reason: HOSPADM

## 2018-03-20 RX ORDER — PROMETHAZINE HYDROCHLORIDE 25 MG/1
25 SUPPOSITORY RECTAL EVERY 6 HOURS PRN
Status: DISCONTINUED | OUTPATIENT
Start: 2018-03-20 | End: 2018-03-23 | Stop reason: HOSPADM

## 2018-03-20 RX ORDER — BISACODYL 10 MG
10 SUPPOSITORY, RECTAL RECTAL
Status: DISCONTINUED | OUTPATIENT
Start: 2018-03-20 | End: 2018-03-23 | Stop reason: HOSPADM

## 2018-03-20 RX ORDER — GABAPENTIN 300 MG/1
CAPSULE ORAL
Status: DISPENSED
Start: 2018-03-20 | End: 2018-03-20

## 2018-03-20 RX ORDER — CHLORPROMAZINE HYDROCHLORIDE 10 MG/1
25 TABLET, FILM COATED ORAL EVERY 6 HOURS PRN
Status: DISCONTINUED | OUTPATIENT
Start: 2018-03-20 | End: 2018-03-23 | Stop reason: HOSPADM

## 2018-03-20 RX ORDER — HALOPERIDOL 5 MG/ML
INJECTION INTRAMUSCULAR
Status: DISPENSED
Start: 2018-03-20 | End: 2018-03-20

## 2018-03-20 RX ADMIN — STANDARDIZED SENNA CONCENTRATE AND DOCUSATE SODIUM 1 TABLET: 8.6; 5 TABLET, FILM COATED ORAL at 20:18

## 2018-03-20 RX ADMIN — DIAZEPAM 5 MG: 5 TABLET ORAL at 11:15

## 2018-03-20 RX ADMIN — HYDROMORPHONE HYDROCHLORIDE 0.5 MG: 10 INJECTION, SOLUTION INTRAMUSCULAR; INTRAVENOUS; SUBCUTANEOUS at 10:30

## 2018-03-20 RX ADMIN — METAXALONE 800 MG: 800 TABLET ORAL at 20:18

## 2018-03-20 RX ADMIN — CEFAZOLIN SODIUM 2 G: 2 INJECTION, SOLUTION INTRAVENOUS at 16:57

## 2018-03-20 RX ADMIN — ASPIRIN 81 MG: 81 TABLET, COATED ORAL at 16:57

## 2018-03-20 RX ADMIN — HYDROMORPHONE HYDROCHLORIDE 0.5 MG: 10 INJECTION, SOLUTION INTRAMUSCULAR; INTRAVENOUS; SUBCUTANEOUS at 10:10

## 2018-03-20 RX ADMIN — FENTANYL CITRATE 50 MCG: 50 INJECTION, SOLUTION INTRAMUSCULAR; INTRAVENOUS at 10:15

## 2018-03-20 RX ADMIN — HYDROMORPHONE HYDROCHLORIDE 0.5 MG: 10 INJECTION, SOLUTION INTRAMUSCULAR; INTRAVENOUS; SUBCUTANEOUS at 11:41

## 2018-03-20 RX ADMIN — FENTANYL CITRATE 50 MCG: 50 INJECTION, SOLUTION INTRAMUSCULAR; INTRAVENOUS at 11:30

## 2018-03-20 RX ADMIN — HYDROCODONE BITARTRATE AND ACETAMINOPHEN 30 ML: 2.5; 108 SOLUTION ORAL at 10:30

## 2018-03-20 RX ADMIN — OXYCODONE HYDROCHLORIDE 10 MG: 10 TABLET ORAL at 20:38

## 2018-03-20 RX ADMIN — OMEPRAZOLE 20 MG: 20 CAPSULE, DELAYED RELEASE ORAL at 20:18

## 2018-03-20 RX ADMIN — FENTANYL CITRATE 50 MCG: 50 INJECTION, SOLUTION INTRAMUSCULAR; INTRAVENOUS at 10:20

## 2018-03-20 RX ADMIN — DOCUSATE SODIUM 100 MG: 100 CAPSULE ORAL at 20:18

## 2018-03-20 RX ADMIN — KETOROLAC TROMETHAMINE 15 MG: 30 INJECTION, SOLUTION INTRAMUSCULAR; INTRAVENOUS at 12:00

## 2018-03-20 RX ADMIN — METAXALONE 800 MG: 800 TABLET ORAL at 16:59

## 2018-03-20 RX ADMIN — FENTANYL CITRATE 50 MCG: 50 INJECTION, SOLUTION INTRAMUSCULAR; INTRAVENOUS at 11:00

## 2018-03-20 RX ADMIN — ACETAMINOPHEN 1000 MG: 500 TABLET ORAL at 16:57

## 2018-03-20 RX ADMIN — CEFAZOLIN SODIUM 2 G: 2 INJECTION, SOLUTION INTRAVENOUS at 07:45

## 2018-03-20 RX ADMIN — KETOROLAC TROMETHAMINE 15 MG: 30 INJECTION, SOLUTION INTRAMUSCULAR; INTRAVENOUS at 16:57

## 2018-03-20 RX ADMIN — TRANEXAMIC ACID 2000 MG: 100 INJECTION, SOLUTION INTRAVENOUS at 11:00

## 2018-03-20 RX ADMIN — SODIUM CHLORIDE: 9 INJECTION, SOLUTION INTRAVENOUS at 14:13

## 2018-03-20 RX ADMIN — FENTANYL CITRATE 50 MCG: 50 INJECTION, SOLUTION INTRAMUSCULAR; INTRAVENOUS at 10:45

## 2018-03-20 RX ADMIN — MORPHINE SULFATE 30 MG: 30 TABLET, EXTENDED RELEASE ORAL at 20:18

## 2018-03-20 ASSESSMENT — PAIN SCALES - GENERAL
PAINLEVEL_OUTOF10: 8
PAINLEVEL_OUTOF10: 7
PAINLEVEL_OUTOF10: 9

## 2018-03-20 ASSESSMENT — LIFESTYLE VARIABLES
HAVE PEOPLE ANNOYED YOU BY CRITICIZING YOUR DRINKING: NO
EVER FELT BAD OR GUILTY ABOUT YOUR DRINKING: NO
EVER HAD A DRINK FIRST THING IN THE MORNING TO STEADY YOUR NERVES TO GET RID OF A HANGOVER: NO
HOW MANY TIMES IN THE PAST YEAR HAVE YOU HAD 5 OR MORE DRINKS IN A DAY: 0
ON A TYPICAL DAY WHEN YOU DRINK ALCOHOL HOW MANY DRINKS DO YOU HAVE: 2
TOTAL SCORE: 0
ALCOHOL_USE: YES
TOTAL SCORE: 0
EVER_SMOKED: NEVER
CONSUMPTION TOTAL: NEGATIVE
AVERAGE NUMBER OF DAYS PER WEEK YOU HAVE A DRINK CONTAINING ALCOHOL: 3
HAVE YOU EVER FELT YOU SHOULD CUT DOWN ON YOUR DRINKING: NO
TOTAL SCORE: 0

## 2018-03-20 ASSESSMENT — PATIENT HEALTH QUESTIONNAIRE - PHQ9
SUM OF ALL RESPONSES TO PHQ QUESTIONS 1-9: 0
2. FEELING DOWN, DEPRESSED, IRRITABLE, OR HOPELESS: NOT AT ALL
SUM OF ALL RESPONSES TO PHQ9 QUESTIONS 1 AND 2: 0
1. LITTLE INTEREST OR PLEASURE IN DOING THINGS: NOT AT ALL

## 2018-03-20 NOTE — PROGRESS NOTES
Report from [place] in PACU. Patient arrived to unit. Continuous pulse oximetry, scds and polar ice placed. Patient very drowsy at this time, arouses to voice, however drifts off during conversations. Currently on 2LO2. Tolerating clear liquid diet at this time.

## 2018-03-20 NOTE — OR NURSING
PT RESTING   EYES CLOSED. EASILY AROUSED BY NORMAL VOICE. ENCOURAGED REST. ' LET MEDICATIONS WORK FOR YOU'    HR 75 /81 2L O2 100 % RR 20

## 2018-03-20 NOTE — OP REPORT
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    PROCEDURE PERFORMED:  Right total hip arthroplasty.    SURGEON:  Bryon Levine MD    ANESTHESIA:  General.    ANESTHESIOLOGIST:  Nickolas Montelongo M.D.    ASSISTANT:  Brandon Mix PA-C    ESTIMATED BLOOD LOSS:  700.    COMPONENTS PLACED:  Smith and Nephew size 48 multihole R3 acetabulum with   neutral 32 mm polyethylene liner and a Smith and Nephew size 7 high offset   Anthology femur with a 32 mm +0 Oxinium head.    FINDINGS:  Severe osteoarthritis with destroyed femoral head and acetabular   dysplasia and extremely vascular periarticular tissues.    SUMMARY:  Patient was brought to the operating room and anesthesia was   administered.  She was placed in the left lateral decubitus position with   well-padded, axillary roll placed, and secured to the pegboard and appropriate   position.  Her right leg was then prepped and draped in usual sterile manner.    Time-out was called.    I made an incision starting just lateral to the ASIS and extending down to the   mid aspect of the trochanter a little bit distal to that.  Dissection was   carried sharply through skin and subcutaneous tissues.  She had extremely   vascular subcutaneous tissue with large veins and there was a fair amount of   blood loss even before we got to the fascia, but we were able to control that.    The fascia was opened along the long anterior aspect of the gluteus medius   and the interval between the gluteus medius and the tensor fascia was   identified.  We came down the capsule and dissected the reflected rectus off,   a bit placed our inferior edge and superior retractors extracapsular and we   opened the capsule.  At this point, it was extremely vascular.  There was a    very large bleeder on the inferior aspect of the capsule was peeled off the   femur where there was a fair amount of blood loss, we were eventually able to   control that.  We completed the capsulotomy.  The femoral head was just a   little wafer really did not exist as well as a scar tissue up in the   acetabulum, but we did make an initial cut just at the base of what would have   the head removed that and cleaned out the acetabulum.  There was significant   amount of scarring and large labrum and synovium.  We then identified the   shoulder of the femur and made our final cut using the femoral cutting guide   and removed the rest of the neck.  We then did our release of the capsule off   the superior part of the neck as well as external rotators inside part of the   greater trochanter.  We exposed the acetabulum.  We had a lot more scar and   labrum to excise.  It was a difficult visualization.  We eventually were able   to see it again it was quite vascular bleeding.  We reamed down the inner   table, which was quite thin.  So, we medialized it a bit just to get some   better coverage.  The 48 reamer was our final reamer and the real 48 cup was   placed.  We did use a multi-hole cup someone to make sure we can get many   screws as possible.  We have got 2 superior screws which had excellent   purchase, and inferiorly there was only one, there was a little bit of bone,   we put a 15 mm and just got a fair purchase, but there were no significant   osteophytes removed.  We made sure that the cup sat just under the patient's   anterior lip and we placed the liner into position and locked there.    We then exposed the femur, took a little bit more lateral bone and we were   able to broach easily up to a size 7.  We reduced it with the +0 and took an   x-ray, AP, which we had good position of the cup.  The femoral component was a   good size.  We did elect to place the real high offset because she had a bit   of a  varus neck and she is extremely flexible person, and I think higher risk   for dislocation, so the extra offset help with that.  The real 7 was placed.    We trialed with a -3, the 0, the 0 was very uncomfortable with the tension and   the final reduction was done after all debris irrigated from the joint.  We   soaked the wound with dilute Betadine for a few minutes and flushed that with   saline.  We were able to piece back the capsule with interrupted #1 Vicryl.    We closed the fascia with #1 Vicryl, injected the hip region with a solution   of analgesic and anesthetic, and the skin was closed with layers of Vicryl and   running 3-0 Monocryl.  Silver-impregnated dressing was placed.  Patient was   stable during the procedure and went to recovery room in good condition.       ____________________________________     MD DK LUX / RICHARD    DD:  03/20/2018 10:06:52  DT:  03/20/2018 10:31:27    D#:  [id number]  Job#:  [id number]

## 2018-03-20 NOTE — OR SURGEON
Immediate Post OP Note    PreOp Diagnosis: DJD R hip    PostOp Diagnosis: same    Procedure(s):  HIP ARTHROPLASTY TOTAL - Wound Class: Clean    Surgeon(s):  Bryon Levine M.D.    Anesthesiologist/Type of Anesthesia:  Anesthesiologist: Nickolas Montelongo M.D./General    Surgical Staff:  Assistant: DUSTIN Willis  Circulator: Leydi Avendaño R.N.  Limb Rosado: Esme Whatley  Relief Circulator: Danette Victoria R.N.  Scrub Person: Emily Sorenson    Specimens removed if any:  * No specimens in log *    Estimated Blood Loss: 700    Findings: severe DJD with destroyed femoral head, acetabular dysplasia    Complications: increased blood loss, no hypotension        3/20/2018 9:57 AM Bryon Levine M.D.

## 2018-03-20 NOTE — OR NURSING
VSS. GRIMM. MEPILEX TO RIGHT UPPER HIP. C/D/I. ICE TO SITE. ABLE TO MOVE TOES. LEG WARM/DRY.    XRAY DONE. TXA GIVEN.    PT MAIN COMPLAINT HAS BEEN LOWER RIGHT BACK DISCOMFORT. DESCRIBED AS 'TIRED' AND NEEDING MASSAGE. BACK APPEARS NORMAL.   DR BONILLA WAS AT BEDSIDE. TELLS NURSE THAT MAY BE DUE TO POSITIONING IN SURGERY.  PT HAS BEEN REPOSITIONED SEVERAL TIMES. PRESENTLY WITH PILLOWS UNDER RIGHT HIP AND RIGHT LOWER LEG.    PT HAS RECEIVED HYCET 7.5 MG X 2 , DILAUDID 1 MG IV, FENTNANYL 200 MCGS IV, VALIUM 5 MG PO.

## 2018-03-21 LAB
HCT VFR BLD AUTO: 23.5 % (ref 37–47)
HGB BLD-MCNC: 8 G/DL (ref 12–16)

## 2018-03-21 PROCEDURE — 97535 SELF CARE MNGMENT TRAINING: CPT

## 2018-03-21 PROCEDURE — 700102 HCHG RX REV CODE 250 W/ 637 OVERRIDE(OP): Performed by: PHYSICIAN ASSISTANT

## 2018-03-21 PROCEDURE — G8979 MOBILITY GOAL STATUS: HCPCS | Mod: CI

## 2018-03-21 PROCEDURE — G8988 SELF CARE GOAL STATUS: HCPCS | Mod: CJ

## 2018-03-21 PROCEDURE — 85018 HEMOGLOBIN: CPT

## 2018-03-21 PROCEDURE — 97166 OT EVAL MOD COMPLEX 45 MIN: CPT

## 2018-03-21 PROCEDURE — A9270 NON-COVERED ITEM OR SERVICE: HCPCS | Performed by: PHYSICIAN ASSISTANT

## 2018-03-21 PROCEDURE — 700112 HCHG RX REV CODE 229: Performed by: PHYSICIAN ASSISTANT

## 2018-03-21 PROCEDURE — 700105 HCHG RX REV CODE 258: Performed by: PHYSICIAN ASSISTANT

## 2018-03-21 PROCEDURE — G8978 MOBILITY CURRENT STATUS: HCPCS | Mod: CK

## 2018-03-21 PROCEDURE — 97162 PT EVAL MOD COMPLEX 30 MIN: CPT

## 2018-03-21 PROCEDURE — G8987 SELF CARE CURRENT STATUS: HCPCS | Mod: CK

## 2018-03-21 PROCEDURE — 85014 HEMATOCRIT: CPT

## 2018-03-21 PROCEDURE — 770001 HCHG ROOM/CARE - MED/SURG/GYN PRIV*

## 2018-03-21 PROCEDURE — 36415 COLL VENOUS BLD VENIPUNCTURE: CPT

## 2018-03-21 PROCEDURE — 700111 HCHG RX REV CODE 636 W/ 250 OVERRIDE (IP): Performed by: PHYSICIAN ASSISTANT

## 2018-03-21 RX ADMIN — MORPHINE SULFATE 30 MG: 30 TABLET, EXTENDED RELEASE ORAL at 09:12

## 2018-03-21 RX ADMIN — ACETAMINOPHEN 1000 MG: 500 TABLET ORAL at 11:30

## 2018-03-21 RX ADMIN — METAXALONE 800 MG: 800 TABLET ORAL at 19:51

## 2018-03-21 RX ADMIN — DEXAMETHASONE SODIUM PHOSPHATE 8 MG: 4 INJECTION, SOLUTION INTRAMUSCULAR; INTRAVENOUS at 05:45

## 2018-03-21 RX ADMIN — KETOROLAC TROMETHAMINE 15 MG: 30 INJECTION, SOLUTION INTRAMUSCULAR; INTRAVENOUS at 00:12

## 2018-03-21 RX ADMIN — OXYCODONE HYDROCHLORIDE 10 MG: 10 TABLET ORAL at 05:46

## 2018-03-21 RX ADMIN — ASPIRIN 81 MG: 81 TABLET, COATED ORAL at 09:12

## 2018-03-21 RX ADMIN — DOCUSATE SODIUM 100 MG: 100 CAPSULE ORAL at 19:51

## 2018-03-21 RX ADMIN — SODIUM CHLORIDE: 9 INJECTION, SOLUTION INTRAVENOUS at 19:51

## 2018-03-21 RX ADMIN — MORPHINE SULFATE 30 MG: 30 TABLET, EXTENDED RELEASE ORAL at 19:51

## 2018-03-21 RX ADMIN — ACETAMINOPHEN 1000 MG: 500 TABLET ORAL at 00:13

## 2018-03-21 RX ADMIN — OMEPRAZOLE 20 MG: 20 CAPSULE, DELAYED RELEASE ORAL at 19:51

## 2018-03-21 RX ADMIN — ACETAMINOPHEN 1000 MG: 500 TABLET ORAL at 05:46

## 2018-03-21 RX ADMIN — ACETAMINOPHEN 1000 MG: 500 TABLET ORAL at 17:47

## 2018-03-21 RX ADMIN — POTASSIUM CHLORIDE 10 MEQ: 1500 TABLET, EXTENDED RELEASE ORAL at 09:12

## 2018-03-21 RX ADMIN — ASPIRIN 81 MG: 81 TABLET, COATED ORAL at 19:51

## 2018-03-21 RX ADMIN — METAXALONE 800 MG: 800 TABLET ORAL at 14:58

## 2018-03-21 RX ADMIN — METAXALONE 800 MG: 800 TABLET ORAL at 09:12

## 2018-03-21 RX ADMIN — KETOROLAC TROMETHAMINE 15 MG: 30 INJECTION, SOLUTION INTRAMUSCULAR; INTRAVENOUS at 11:30

## 2018-03-21 RX ADMIN — DOCUSATE SODIUM 100 MG: 100 CAPSULE ORAL at 09:12

## 2018-03-21 RX ADMIN — FUROSEMIDE 20 MG: 20 TABLET ORAL at 09:12

## 2018-03-21 RX ADMIN — CEFAZOLIN SODIUM 2 G: 2 INJECTION, SOLUTION INTRAVENOUS at 00:13

## 2018-03-21 RX ADMIN — STANDARDIZED SENNA CONCENTRATE AND DOCUSATE SODIUM 1 TABLET: 8.6; 5 TABLET, FILM COATED ORAL at 19:51

## 2018-03-21 RX ADMIN — OXYCODONE HYDROCHLORIDE 10 MG: 10 TABLET ORAL at 14:58

## 2018-03-21 RX ADMIN — KETOROLAC TROMETHAMINE 15 MG: 30 INJECTION, SOLUTION INTRAMUSCULAR; INTRAVENOUS at 05:46

## 2018-03-21 RX ADMIN — OXYCODONE HYDROCHLORIDE 10 MG: 10 TABLET ORAL at 09:51

## 2018-03-21 RX ADMIN — OXYCODONE HYDROCHLORIDE 10 MG: 10 TABLET ORAL at 01:45

## 2018-03-21 RX ADMIN — KETOROLAC TROMETHAMINE 15 MG: 30 INJECTION, SOLUTION INTRAMUSCULAR; INTRAVENOUS at 17:47

## 2018-03-21 RX ADMIN — TAMSULOSIN HYDROCHLORIDE 0.4 MG: 0.4 CAPSULE ORAL at 09:12

## 2018-03-21 ASSESSMENT — COGNITIVE AND FUNCTIONAL STATUS - GENERAL
DRESSING REGULAR LOWER BODY CLOTHING: A LOT
TURNING FROM BACK TO SIDE WHILE IN FLAT BAD: UNABLE
SUGGESTED CMS G CODE MODIFIER DAILY ACTIVITY: CK
SUGGESTED CMS G CODE MODIFIER MOBILITY: CM
TOILETING: A LOT
HELP NEEDED FOR BATHING: A LOT
PERSONAL GROOMING: A LITTLE
WALKING IN HOSPITAL ROOM: A LOT
MOBILITY SCORE: 9
DRESSING REGULAR UPPER BODY CLOTHING: A LITTLE
MOVING TO AND FROM BED TO CHAIR: UNABLE
STANDING UP FROM CHAIR USING ARMS: A LITTLE
MOVING FROM LYING ON BACK TO SITTING ON SIDE OF FLAT BED: UNABLE
CLIMB 3 TO 5 STEPS WITH RAILING: TOTAL
DAILY ACTIVITIY SCORE: 16

## 2018-03-21 ASSESSMENT — GAIT ASSESSMENTS
DEVIATION: ANTALGIC;DECREASED HEEL STRIKE;DECREASED TOE OFF
GAIT LEVEL OF ASSIST: CONTACT GUARD ASSIST
DISTANCE (FEET): 5
ASSISTIVE DEVICE: FRONT WHEEL WALKER

## 2018-03-21 ASSESSMENT — PAIN SCALES - GENERAL
PAINLEVEL_OUTOF10: 9
PAINLEVEL_OUTOF10: 8
PAINLEVEL_OUTOF10: 10
PAINLEVEL_OUTOF10: 7
PAINLEVEL_OUTOF10: 9
PAINLEVEL_OUTOF10: 6
PAINLEVEL_OUTOF10: 7
PAINLEVEL_OUTOF10: 8
PAINLEVEL_OUTOF10: 7
PAINLEVEL_OUTOF10: 7
PAINLEVEL_OUTOF10: 9
PAINLEVEL_OUTOF10: 7

## 2018-03-21 ASSESSMENT — ACTIVITIES OF DAILY LIVING (ADL): TOILETING: INDEPENDENT

## 2018-03-21 NOTE — CARE PLAN
Problem: Knowledge Deficit  Goal: Knowledge of disease process/condition, treatment plan, diagnostic tests, and medications will improve    Intervention: Explain information regarding disease process/condition, treatment plan, diagnostic tests, and medications and document in education  PWB RLE, PT/OT, SNF plans      Problem: Pain  Goal: Alleviation of Pain or a reduction in pain to the patient's comfort goal    Intervention: Pain Management-Medications  Prn meds per MAR

## 2018-03-21 NOTE — PROGRESS NOTES
Seen by Dr. Levine aaox4, c/o right hip pain and heaviness, prn meds per MAR, SNF plans, up to chair for breakfast, voided by bsc and back to bed, partial weight bearing RLE, ambulated slow with fww, POC discussed, needs attended.

## 2018-03-21 NOTE — PROGRESS NOTES
"S: Feeling well, mostly worried about insurance and disability issues    O: alert and oriented, comfortable    Blood pressure 102/61, pulse (!) 116, temperature 37.2 °C (98.9 °F), resp. rate 18, height 1.6 m (5' 3\"), weight 100.7 kg (222 lb 0.1 oz), last menstrual period 05/17/2017, SpO2 93 %.            Intake/Output Summary (Last 24 hours) at 03/21/18 0720  Last data filed at 03/21/18 0600   Gross per 24 hour   Intake             3250 ml   Output              900 ml   Net             2350 ml         A:  Patient Active Problem List    Diagnosis Date Noted   • Mild intermittent asthma without complication 04/25/2012     Priority: High   • Status post right hip replacement 03/20/2018   • Disability of walking- due to severe bilateral hip DJD- needs bilat THR- placed on full disability 2/5/18-2/5/19 02/05/2018   • Uncomplicated opioid dependence (CMS-HCC)- pcp dr hackett to rx 01/31/2018   • Essential hypertension 01/31/2018   • Primary osteoarthritis of right hip- severe; THR tbd 3/20/18- DR JERRY 01/22/2018   • Lymphedema 10/17/2017   • Debility- FMLA through april 2018 10/12/2017   • Venous insufficiency 10/03/2017   • Colon cancer screening- needs 08/09/2017   • Chronic bilateral low back pain with bilateral sciatica- pain mgt 07/26/2017   • Obesity, morbid, BMI 40.0-49.9 (CMS-HCC)- s/p gastric bypass 10/21/2016   • Vitamin B 12 deficiency 06/02/2016   • Fibroids 08/13/2013       Stable after VILMA    PLAN: Mobilize, shooting for skilled nursing  "

## 2018-03-21 NOTE — DISCHARGE PLANNING
TCN met with patient at bedside to discuss the care teams recommendation for SNF. Patient is agreeable to suggestion and requested choice be faxed to [facility]. [facility] faxed to [facility]. TCN to follow as needed.

## 2018-03-21 NOTE — DISCHARGE PLANNING
Received voicemail from Kaya at [facility], referral has been accepted. Katalina BAILEY notified.

## 2018-03-21 NOTE — THERAPY
"Occupational Therapy Evaluation completed.   Functional Status:  MIN assist SPT with FWW. MAX assist toileting and LB ADLs  Plan of Care: Will benefit from Occupational Therapy 3 times per week  Discharge Recommendations:  Equipment: Will Continue to Assess for Equipment Needs. Post-acute therapy Discharge to a transitional care facility for continued skilled therapy services.    See \"Rehab Therapy-Acute\" Patient Summary Report for complete documentation.      Pt is a 53 y/o female admitted due to R hip OA s/p R anterior total hip replacement. PWB 50% RLE. Anterior hip precautions. Pt currently requiring MIN to MOD assist for mobility. MAX assist toileting and LB ADLs. Educated pt in UE ex to complete while in hospital to inc UE strength for functional transfers and ADLs with PWB status. Pt completed 1 set of 12 reps of all ex. Handout of ex issued. Pt would benefit from post acute therapy in a transitional care unit at d/c. Pt is very pleasant, cooperative, and motivated to improve. Pt would benefit from continued skilled OT to maximize safety and independence in ADLS and mobility prior to d/c.  "

## 2018-03-21 NOTE — PROGRESS NOTES
2 RN skin check completed with Ema PHAM. Scab to right shin noted. Healed surgical scars to abdomen. Right hip surgical incision clean dry and intact. No skin breakdown noted.

## 2018-03-21 NOTE — PROGRESS NOTES
Report received from VERO Mares and care assumed for patient at 1900. Pt A&O X 4,  On oxygen via NC. VSS.  Voiding via bedpan, pt educated on need to ambulate to commode or bathroom. CMS +. SCDs to BLE.   Pt calls appropriately.   Pt sitting up in bed, watching tv. Plan of care discussed including pain management, mobilization, safety. Hourly rounding in place.  Call light and belongings at bedside and within reach.  Bed in lowest position.

## 2018-03-21 NOTE — THERAPY
"Physical Therapy Evaluation completed.   Bed Mobility:  Supine to Sit: Minimal Assist  Transfers: Sit to Stand: Minimal Assist  Gait: Level Of Assist: Contact Guard Assist with Front-Wheel Walker       Plan of Care: Will benefit from Physical Therapy 5 times per week  Discharge Recommendations: Equipment: Will Continue to Assess for Equipment Needs.     Ms. Ovalle is a 53 y/o female who presents to acute secondary to R VILMA. She presents with significant lower extremity weakness, gross motor coordination deficits, dynamic balance impairments, and pain. These deficits negatively impact her ability to perform gait, transfers, bed mobility, and stairs at her prior level of function and prevent her safe discharge home. She was able to ambulate, however was limited due to UE fatigue. Pt appeared to follow WB precautions correctly with gait. More muscle contraction in R LE, provided pt with VILMA therex handout and reviewed all exercises. Emphasized how important it is for patient to perform therex througout day to improve her stength and be able to better participate with therapy. Due to her poor social support and significant debility she will require post acute rehabiliation prior to discharge home. She would benefit from continued acute phyiscal therapy services to improve her mobility and decrease risk for falls.     See \"Rehab Therapy-Acute\" Patient Summary Report for complete documentation.     "

## 2018-03-21 NOTE — CARE PLAN
Problem: Safety  Goal: Will remain free from injury  Outcome: PROGRESSING AS EXPECTED  Call light within reach, pt calls for assistance at all times.  Bed in low position and locked, upper bedside rails up, proper mobility signs placed, personal possessions within reach, treaded socks on. Hourly rounding in place.          Problem: Respiratory:  Goal: Respiratory status will improve  Outcome: PROGRESSING AS EXPECTED  Pt wearing 2L of oxygen with oxygen saturation of 100%, oxygen level decreased to 0.5L oxygen and pt oxygen saturation at 94%

## 2018-03-22 LAB
HCT VFR BLD AUTO: 22 % (ref 37–47)
HGB BLD-MCNC: 7.5 G/DL (ref 12–16)

## 2018-03-22 PROCEDURE — 700102 HCHG RX REV CODE 250 W/ 637 OVERRIDE(OP): Performed by: PHYSICIAN ASSISTANT

## 2018-03-22 PROCEDURE — 85014 HEMATOCRIT: CPT

## 2018-03-22 PROCEDURE — 306589 SLEEVE,VASO CALF LARGE: Performed by: ORTHOPAEDIC SURGERY

## 2018-03-22 PROCEDURE — 306481 GARMENT,FOOT IMPAD VASO: Performed by: ORTHOPAEDIC SURGERY

## 2018-03-22 PROCEDURE — A9270 NON-COVERED ITEM OR SERVICE: HCPCS | Performed by: PHYSICIAN ASSISTANT

## 2018-03-22 PROCEDURE — 700112 HCHG RX REV CODE 229: Performed by: PHYSICIAN ASSISTANT

## 2018-03-22 PROCEDURE — 97116 GAIT TRAINING THERAPY: CPT

## 2018-03-22 PROCEDURE — 770001 HCHG ROOM/CARE - MED/SURG/GYN PRIV*

## 2018-03-22 PROCEDURE — 97530 THERAPEUTIC ACTIVITIES: CPT

## 2018-03-22 PROCEDURE — 700111 HCHG RX REV CODE 636 W/ 250 OVERRIDE (IP): Performed by: PHYSICIAN ASSISTANT

## 2018-03-22 PROCEDURE — 85018 HEMOGLOBIN: CPT

## 2018-03-22 PROCEDURE — 97110 THERAPEUTIC EXERCISES: CPT

## 2018-03-22 PROCEDURE — 36415 COLL VENOUS BLD VENIPUNCTURE: CPT

## 2018-03-22 RX ORDER — CALCIUM CARBONATE 500 MG/1
1000 TABLET, CHEWABLE ORAL EVERY 4 HOURS PRN
Status: DISCONTINUED | OUTPATIENT
Start: 2018-03-22 | End: 2018-03-23 | Stop reason: HOSPADM

## 2018-03-22 RX ADMIN — ANTACID TABLETS 1000 MG: 500 TABLET, CHEWABLE ORAL at 20:15

## 2018-03-22 RX ADMIN — OXYCODONE HYDROCHLORIDE 10 MG: 10 TABLET ORAL at 05:22

## 2018-03-22 RX ADMIN — ASPIRIN 81 MG: 81 TABLET, COATED ORAL at 07:48

## 2018-03-22 RX ADMIN — ASPIRIN 81 MG: 81 TABLET, COATED ORAL at 20:15

## 2018-03-22 RX ADMIN — OMEPRAZOLE 20 MG: 20 CAPSULE, DELAYED RELEASE ORAL at 20:15

## 2018-03-22 RX ADMIN — METAXALONE 800 MG: 800 TABLET ORAL at 21:33

## 2018-03-22 RX ADMIN — ACETAMINOPHEN 1000 MG: 500 TABLET ORAL at 11:40

## 2018-03-22 RX ADMIN — KETOROLAC TROMETHAMINE 15 MG: 30 INJECTION, SOLUTION INTRAMUSCULAR; INTRAVENOUS at 05:22

## 2018-03-22 RX ADMIN — FUROSEMIDE 20 MG: 20 TABLET ORAL at 07:47

## 2018-03-22 RX ADMIN — OXYCODONE HYDROCHLORIDE 10 MG: 10 TABLET ORAL at 11:40

## 2018-03-22 RX ADMIN — MORPHINE SULFATE 30 MG: 30 TABLET, EXTENDED RELEASE ORAL at 07:48

## 2018-03-22 RX ADMIN — MORPHINE SULFATE 30 MG: 30 TABLET, EXTENDED RELEASE ORAL at 20:15

## 2018-03-22 RX ADMIN — DOCUSATE SODIUM 100 MG: 100 CAPSULE ORAL at 20:15

## 2018-03-22 RX ADMIN — STANDARDIZED SENNA CONCENTRATE AND DOCUSATE SODIUM 1 TABLET: 8.6; 5 TABLET, FILM COATED ORAL at 20:15

## 2018-03-22 RX ADMIN — ACETAMINOPHEN 1000 MG: 500 TABLET ORAL at 00:16

## 2018-03-22 RX ADMIN — METAXALONE 800 MG: 800 TABLET ORAL at 07:47

## 2018-03-22 RX ADMIN — ACETAMINOPHEN 1000 MG: 500 TABLET ORAL at 17:37

## 2018-03-22 RX ADMIN — OXYCODONE HYDROCHLORIDE 10 MG: 10 TABLET ORAL at 16:06

## 2018-03-22 RX ADMIN — OXYCODONE HYDROCHLORIDE 10 MG: 10 TABLET ORAL at 00:16

## 2018-03-22 RX ADMIN — OXYCODONE HYDROCHLORIDE 10 MG: 10 TABLET ORAL at 22:53

## 2018-03-22 RX ADMIN — DOCUSATE SODIUM 100 MG: 100 CAPSULE ORAL at 07:48

## 2018-03-22 RX ADMIN — ACETAMINOPHEN 1000 MG: 500 TABLET ORAL at 22:52

## 2018-03-22 RX ADMIN — TAMSULOSIN HYDROCHLORIDE 0.4 MG: 0.4 CAPSULE ORAL at 07:48

## 2018-03-22 RX ADMIN — METAXALONE 800 MG: 800 TABLET ORAL at 15:09

## 2018-03-22 RX ADMIN — POTASSIUM CHLORIDE 10 MEQ: 1500 TABLET, EXTENDED RELEASE ORAL at 07:48

## 2018-03-22 RX ADMIN — ANTACID TABLETS 1000 MG: 500 TABLET, CHEWABLE ORAL at 15:08

## 2018-03-22 RX ADMIN — ACETAMINOPHEN 1000 MG: 500 TABLET ORAL at 05:22

## 2018-03-22 RX ADMIN — ANTACID TABLETS 1000 MG: 500 TABLET, CHEWABLE ORAL at 09:48

## 2018-03-22 RX ADMIN — KETOROLAC TROMETHAMINE 15 MG: 30 INJECTION, SOLUTION INTRAMUSCULAR; INTRAVENOUS at 00:17

## 2018-03-22 ASSESSMENT — COGNITIVE AND FUNCTIONAL STATUS - GENERAL
STANDING UP FROM CHAIR USING ARMS: A LITTLE
TURNING FROM BACK TO SIDE WHILE IN FLAT BAD: UNABLE
CLIMB 3 TO 5 STEPS WITH RAILING: A LOT
SUGGESTED CMS G CODE MODIFIER MOBILITY: CL
MOVING TO AND FROM BED TO CHAIR: UNABLE
WALKING IN HOSPITAL ROOM: A LITTLE
MOVING FROM LYING ON BACK TO SITTING ON SIDE OF FLAT BED: UNABLE
MOBILITY SCORE: 11

## 2018-03-22 ASSESSMENT — PAIN SCALES - GENERAL
PAINLEVEL_OUTOF10: 9
PAINLEVEL_OUTOF10: 7
PAINLEVEL_OUTOF10: 8
PAINLEVEL_OUTOF10: 6
PAINLEVEL_OUTOF10: 9
PAINLEVEL_OUTOF10: 9

## 2018-03-22 ASSESSMENT — GAIT ASSESSMENTS
DISTANCE (FEET): 5
GAIT LEVEL OF ASSIST: CONTACT GUARD ASSIST
DEVIATION: ANTALGIC
ASSISTIVE DEVICE: FRONT WHEEL WALKER

## 2018-03-22 ASSESSMENT — COPD QUESTIONNAIRES
COPD SCREENING SCORE: 1
DO YOU EVER COUGH UP ANY MUCUS OR PHLEGM?: NO/ONLY WITH OCCASIONAL COLDS OR INFECTIONS
HAVE YOU SMOKED AT LEAST 100 CIGARETTES IN YOUR ENTIRE LIFE: NO/DON'T KNOW
DURING THE PAST 4 WEEKS HOW MUCH DID YOU FEEL SHORT OF BREATH: NONE/LITTLE OF THE TIME

## 2018-03-22 ASSESSMENT — LIFESTYLE VARIABLES: EVER_SMOKED: NEVER

## 2018-03-22 NOTE — PROGRESS NOTES
Report received. Assumed care. Pt in bed awake. A/O x4. VSS. Responds appropriately. C/O pain, medicated per MAR, no  SOB. Assessment complete. Dressing to the right hip in place, cdi, polar ice in place. Partial WB 50% to RLE. AV foot pumps on. Discussed POC, pain control, mobility, PT/OT, safety, DC planning , pt verbalizes understanding. Explained importance of calling before getting OOB. Call light and belongings within reach. Pt refuses bed alarm despite education. Bed in the lowest position. Treaded socks in place. Hourly rounding in progress. Will continue to monitor .

## 2018-03-22 NOTE — THERAPY
"Physical Therapy Treatment completed.   Bed Mobility:  Supine to Sit: Minimal Assist  Transfers: Sit to Stand: Minimal Assist  Gait: Level Of Assist: Contact Guard Assist with Front-Wheel Walker       Plan of Care: Will benefit from Physical Therapy 5 times per week  Discharge Recommendations: Equipment: Will Continue to Assess for Equipment Needs.    See \"Rehab Therapy-Acute\" Patient Summary Report for complete documentation.     Pt presenting w/ decreased functional mobility. She was very limited today d/t rib pain from what she considers heart burn. Pt was unable to push through her UE's for gait d/t the pain. Pt represents w/ significant weakness in her R LE requiring assistance w/ mobility. She stated she will work on her HEP in the chair once her pain subsides. She is very motivated to participate and will benefit from post acute therapy.  "

## 2018-03-22 NOTE — DISCHARGE PLANNING
Care Transition Team Assessment    Information Source  Orientation : Oriented x 4  Information Given By: Patient  Informant's Name: Karine Ovalle  Who is responsible for making decisions for patient? : Patient    Readmission Evaluation  Is this a readmission?: No    Elopement Risk  Legal Hold: No  Ambulatory or Self Mobile in Wheelchair: Yes  Disoriented: No  Psychiatric Symptoms: None  History of Wandering: No  Elopement this Admit: No  Vocalizing Wanting to Leave: No  Displays Behaviors, Body Language Wanting to Leave: No-Not at Risk for Elopement  Elopement Risk: Not at Risk for Elopement    Interdisciplinary Discharge Planning  Does Admitting Nurse Feel This Could be a Complex Discharge?: Yes  Primary Care Physician: Micky Espana  Lives with - Patient's Self Care Capacity: Alone and Able to Care For Self (recent assist)  Patient or legal guardian wants to designate a caregiver (see row info): No  Support Systems: Spouse / Significant Other, Friends / Neighbors  Housing / Facility: Extended Care Facility, 1 Story Apartment / Condo  Do You Take your Prescribed Medications Regularly: Yes  Able to Return to Previous ADL's: Future Time w/Therapy  Mobility Issues: Yes  Prior Services: Home-Independent (assist per boyfriend for past 2 weeks. Independent at [facility])  Patient Expects to be Discharged to:: SNF  Assistance Needed: Yes  Durable Medical Equipment: Unknown    Discharge Preparedness  What is your plan after discharge?: Skilled nursing facility  What are your discharge supports?: Partner  Prior Functional Level: Independent with Activities of Daily Living    Functional Assesment  Prior Functional Level: Independent with Activities of Daily Living    Finances  Financial Barriers to Discharge: No  Prescription Coverage: Yes    Vision / Hearing Impairment  Vision Impairment : Yes  Right Eye Vision: Wears Glasses  Left Eye Vision: Wears Glasses    Values / Beliefs / Concerns  Values / Beliefs Concerns :  No  Spiritual Requests During Hospitalization: No    Advance Directive  Advance Directive?: None  Advance Directive offered?: AD Booklet refused    Domestic Abuse  Have you ever been the victim of abuse or violence?: No  Physical Abuse or Sexual Abuse: No  Verbal Abuse or Emotional Abuse: No  Possible Abuse Reported to:: Not Applicable    Psychological Assessment  History of Substance Abuse: None  History of Psychiatric Problems: No    Discharge Risks or Barriers  Discharge risks or barriers?: No    Anticipated Discharge Information  Anticipated discharge disposition: SNF

## 2018-03-22 NOTE — PROGRESS NOTES
"Ortho:  POD# 2    S: Doing moderately well, complaining of swelling in her feet    O: Dressing clean and dry, calf and thigh soft with 2+ distal edema, distal CMS intact    Blood pressure 108/64, pulse 72, temperature 35.9 °C (96.7 °F), resp. rate 16, height 1.6 m (5' 3\"), weight 100.7 kg (222 lb 0.1 oz), last menstrual period 05/17/2017, SpO2 97 %.    Recent Labs      03/21/18   0410  03/22/18   0437   HEMOGLOBIN  8.0*  7.5*   HEMATOCRIT  23.5*  22.0*         Intake/Output Summary (Last 24 hours) at 03/22/18 0952  Last data filed at 03/22/18 0000   Gross per 24 hour   Intake              400 ml   Output                0 ml   Net              400 ml         A:  Patient Active Problem List    Diagnosis Date Noted   • Mild intermittent asthma without complication 04/25/2012     Priority: High   • Status post right hip replacement 03/20/2018   • Disability of walking- due to severe bilateral hip DJD- needs bilat THR- placed on full disability 2/5/18-2/5/19 02/05/2018   • Uncomplicated opioid dependence (CMS-HCC)- pcp dr hackett to rx 01/31/2018   • Essential hypertension 01/31/2018   • Primary osteoarthritis of right hip- severe; THR tbd 3/20/18- DR JERRY 01/22/2018   • Lymphedema 10/17/2017   • Debility- FMLA through april 2018 10/12/2017   • Venous insufficiency 10/03/2017   • Colon cancer screening- needs 08/09/2017   • Chronic bilateral low back pain with bilateral sciatica- pain mgt 07/26/2017   • Obesity, morbid, BMI 40.0-49.9 (CMS-[facility])- s/p gastric bypass 10/21/2016   • Vitamin B 12 deficiency 06/02/2016   • Fibroids 08/13/2013       Doing well POD 2 from right VILMA    PLAN: add feet compression devices, plan for transfer to SNF tomorrow    "

## 2018-03-22 NOTE — PROGRESS NOTES
Report received from VERO Jones and care assumed for patient at 1900. Pt A&O X 4,  On room air oxygen. VSS.  Voiding via ambulating to commode. CMS +. SCDs to BLE.   Pt calls appropriately.  Pt sitting up in bed, watching tv. Plan of care discussed including pain management, mobilization, safety. Hourly rounding in place.  Call light and belongings at bedside and within reach.  Bed in lowest position.

## 2018-03-22 NOTE — CARE PLAN
Problem: Safety  Goal: Free from accidental injury  Outcome: PROGRESSING AS EXPECTED  Treaded socks in place, bed in the lowest position, call light and belongings within reach, pt call for assistance appropriately    Problem: Pain  Goal: Alleviation of Pain or a reduction in pain to the patient's comfort goal  Outcome: PROGRESSING AS EXPECTED  Medicated per MAR with oxy 10 and ms contin with adequate pain control, hourly rounding in progress    Problem: Risk for Deep Vein Thrombosis/Venous Thromboembolism  Goal: DVT/VTE Prevention Measures in Place  Outcome: PROGRESSING AS EXPECTED  Av foot pumps on, pt receivng asa per MAR

## 2018-03-22 NOTE — CARE PLAN
Problem: Safety  Goal: Will remain free from injury  Outcome: PROGRESSING AS EXPECTED  Call light within reach, pt calls for assistance at all times.  Bed in low position and locked, upper bedside rails up, proper mobility signs placed, personal possessions within reach, treaded socks on. Hourly rounding in place.        Problem: Respiratory:  Goal: Respiratory status will improve  Outcome: PROGRESSING SLOWER THAN EXPECTED  Pt on room air. Able to perform incentive spirometry at 1500mL.

## 2018-03-23 VITALS
HEIGHT: 63 IN | HEART RATE: 104 BPM | SYSTOLIC BLOOD PRESSURE: 116 MMHG | WEIGHT: 222 LBS | DIASTOLIC BLOOD PRESSURE: 60 MMHG | BODY MASS INDEX: 39.34 KG/M2 | RESPIRATION RATE: 16 BRPM | TEMPERATURE: 97.4 F | OXYGEN SATURATION: 99 %

## 2018-03-23 LAB
HCT VFR BLD AUTO: 22 % (ref 37–47)
HGB BLD-MCNC: 7.5 G/DL (ref 12–16)

## 2018-03-23 PROCEDURE — 97116 GAIT TRAINING THERAPY: CPT

## 2018-03-23 PROCEDURE — 97530 THERAPEUTIC ACTIVITIES: CPT

## 2018-03-23 PROCEDURE — 85014 HEMATOCRIT: CPT

## 2018-03-23 PROCEDURE — A9270 NON-COVERED ITEM OR SERVICE: HCPCS | Performed by: PHYSICIAN ASSISTANT

## 2018-03-23 PROCEDURE — 85018 HEMOGLOBIN: CPT

## 2018-03-23 PROCEDURE — 36415 COLL VENOUS BLD VENIPUNCTURE: CPT

## 2018-03-23 PROCEDURE — 700112 HCHG RX REV CODE 229: Performed by: PHYSICIAN ASSISTANT

## 2018-03-23 PROCEDURE — 97110 THERAPEUTIC EXERCISES: CPT

## 2018-03-23 PROCEDURE — 700102 HCHG RX REV CODE 250 W/ 637 OVERRIDE(OP): Performed by: PHYSICIAN ASSISTANT

## 2018-03-23 RX ORDER — PSEUDOEPHEDRINE HCL 30 MG
100 TABLET ORAL 2 TIMES DAILY
Qty: 60 CAP
Start: 2018-03-23 | End: 2018-04-26

## 2018-03-23 RX ORDER — OXYCODONE AND ACETAMINOPHEN 10; 325 MG/1; MG/1
1 TABLET ORAL EVERY 6 HOURS PRN
Qty: 12 TAB | Refills: 0 | Status: SHIPPED | OUTPATIENT
Start: 2018-03-28 | End: 2018-03-31

## 2018-03-23 RX ORDER — MORPHINE SULFATE 30 MG/1
30 TABLET, FILM COATED, EXTENDED RELEASE ORAL EVERY 12 HOURS
Qty: 6 TAB | Refills: 0 | Status: SHIPPED | OUTPATIENT
Start: 2018-03-28 | End: 2018-03-31

## 2018-03-23 RX ADMIN — MAGNESIUM HYDROXIDE 30 ML: 400 SUSPENSION ORAL at 09:01

## 2018-03-23 RX ADMIN — ANTACID TABLETS 1000 MG: 500 TABLET, CHEWABLE ORAL at 12:41

## 2018-03-23 RX ADMIN — TAMSULOSIN HYDROCHLORIDE 0.4 MG: 0.4 CAPSULE ORAL at 08:51

## 2018-03-23 RX ADMIN — DOCUSATE SODIUM 100 MG: 100 CAPSULE ORAL at 08:51

## 2018-03-23 RX ADMIN — ACETAMINOPHEN 1000 MG: 500 TABLET ORAL at 05:06

## 2018-03-23 RX ADMIN — OXYCODONE HYDROCHLORIDE 10 MG: 10 TABLET ORAL at 08:51

## 2018-03-23 RX ADMIN — MORPHINE SULFATE 30 MG: 30 TABLET, EXTENDED RELEASE ORAL at 08:51

## 2018-03-23 RX ADMIN — FUROSEMIDE 20 MG: 20 TABLET ORAL at 08:52

## 2018-03-23 RX ADMIN — METAXALONE 800 MG: 800 TABLET ORAL at 08:51

## 2018-03-23 RX ADMIN — POLYETHYLENE GLYCOL 3350 1 PACKET: 17 POWDER, FOR SOLUTION ORAL at 09:01

## 2018-03-23 RX ADMIN — OXYCODONE HYDROCHLORIDE 10 MG: 10 TABLET ORAL at 03:21

## 2018-03-23 RX ADMIN — OXYCODONE HYDROCHLORIDE 10 MG: 10 TABLET ORAL at 13:06

## 2018-03-23 RX ADMIN — ASPIRIN 81 MG: 81 TABLET, COATED ORAL at 08:51

## 2018-03-23 RX ADMIN — ACETAMINOPHEN 1000 MG: 500 TABLET ORAL at 12:37

## 2018-03-23 RX ADMIN — POTASSIUM CHLORIDE 10 MEQ: 1500 TABLET, EXTENDED RELEASE ORAL at 08:52

## 2018-03-23 ASSESSMENT — COGNITIVE AND FUNCTIONAL STATUS - GENERAL
MOVING TO AND FROM BED TO CHAIR: UNABLE
MOVING FROM LYING ON BACK TO SITTING ON SIDE OF FLAT BED: UNABLE
SUGGESTED CMS G CODE MODIFIER MOBILITY: CL
WALKING IN HOSPITAL ROOM: A LITTLE
CLIMB 3 TO 5 STEPS WITH RAILING: A LOT
TURNING FROM BACK TO SIDE WHILE IN FLAT BAD: A LOT
MOBILITY SCORE: 12
STANDING UP FROM CHAIR USING ARMS: A LITTLE

## 2018-03-23 ASSESSMENT — GAIT ASSESSMENTS
GAIT LEVEL OF ASSIST: CONTACT GUARD ASSIST
ASSISTIVE DEVICE: FRONT WHEEL WALKER
DISTANCE (FEET): 10
DEVIATION: ANTALGIC;OTHER (COMMENT)

## 2018-03-23 ASSESSMENT — PAIN SCALES - GENERAL
PAINLEVEL_OUTOF10: 9
PAINLEVEL_OUTOF10: 9
PAINLEVEL_OUTOF10: 7

## 2018-03-23 NOTE — CARE PLAN
Problem: Pain Management  Goal: Pain level will decrease to patient's comfort goal  Outcome: PROGRESSING AS EXPECTED  PRN and scheduled meds administered for pain. Heat pack applied to left ribcage.     Problem: Mobility  Goal: Risk for activity intolerance will decrease  Outcome: PROGRESSING AS EXPECTED  Patient was up to bedside commode with staff assist. Patient needed encouragement to mobilize.

## 2018-03-23 NOTE — DISCHARGE PLANNING
Medical Social Work    Pt has been medically cleared for transfer to SNF. Pt has been accepted to [facility] by Dr. hawley. LYNN called [facility] and set up transport through [employer] for 1300. Pt was updated and agreeable to transfer. LYNN updated pt, bedside RN, and completed the transfer packet.

## 2018-03-23 NOTE — DISCHARGE PLANNING
Spoke with Kaya at [facility]. Transportation set up for 1300 via [employer]. LYNN Darden notified. DC summary faxed to [facility].

## 2018-03-23 NOTE — DISCHARGE INSTRUCTIONS
"Discharge Instructions    Discharged to other by medical transportation with escort. Discharged via wheelchair, hospital escort: Yes.  Special equipment needed: Not Applicable    Be sure to schedule a follow-up appointment with your primary care doctor or any specialists as instructed.     *Follow up with Dr. Levine at scheduled appointment  *Weight bearing as tolerated                 *Activity as tolerated  *Use assistive device for all activity  *Continue exercises provided by physical therapy  *Elevate leg as needed  *Ice as needed (20 minutes every 1-2 hours)  *Keep dressing in place until follow up with doctor  *Ok to shower with waterproof dressing in place  *No soaking of the incision; no baths, hot tubs, or swimming until cleared by doctor  Asprin 81mg twice a day for blood clot prevention          *Take medications as prescribed by doctor  *Call doctor’s office with any questions or concerns     Discharge Plan:   Diet Plan: Discussed  Activity Level: Discussed  Influenza Vaccine Indication: Patient Refuses (\"does not believe in them, it puts the flu inside you\")    I understand that a diet low in cholesterol, fat, and sodium is recommended for good health. Unless I have been given specific instructions below for another diet, I accept this instruction as my diet prescription.   Other diet: Regular diet    Special Instructions: Discharge instructions for the Orthopedic Patient    Follow up with Primary Care Physician within 2 weeks of discharge to home, regarding:  Review of medications and diagnostic testing.  Surveillance for medical complications.  Workup and treatment of osteoporosis, if appropriate.     -Is this a Joint Replacement patient? Yes   Total Joint Hip Replacement Discharge Instructions    Pain  - The goal is to slowly wean off the prescription pain medicine.  - Ice can be used for pain control.  20 minutes at a time is recommended, and never directly against your skin or incision.  - Most " patients are off the pain pills by 3 weeks; others may require a low level of pain medications for many months. If your pain continues to be severe, follow up with your physician.  Infection  Deep hip joint infections that require removal of the prostheses occur in less than 0.1% of patients. Lesser infections in the skin (cellulites) are more common and much more easily treated.  - Keep the incision as clean and dry as possible.  - Always wash your hands before touching your incision.  - Skin infections tend to develop around 7-10 days after surgery, most can be treated with oral antibiotics.  - Dental Care should be delayed for 3 months after surgery, your surgeon recommends taking a dose of antibiotics 1 hour prior to any dental procedure.  After 2 years, most surgeons recommend antibiotics only before an extensive procedure.  Ask your surgeon what he recommends.  - Signs and symptoms of infection can include:  low grade fever, redness, pain, swelling and drainage from your incision.  Notify your surgeon immediately if you develop any of these symptoms.  Post op Disturbances  - Bowel habits - constipation is extremely common and is caused by a combination of anesthesia, lack of mobility and pain medicine.  Use stool softeners or laxatives if necessary. It is important not to ignore this problem, as bowel obstructions can be a serious complication after joint replacement surgery.  - Mood/Energy Level - Many patients experience a lack of energy and endurance for up to 2-3 months after surgery.  Some may also feel down and can even become depressed.  This is likely due to the postoperative anemia, change in activity level, lack of sleep, pain medicine and just the emotional reaction to the surgery itself that is a big disruption in a person’s life.  This usually passes.  If symptoms persist, follow up with your primary physician.  - Returning to work - Your surgeon will give you more specific instructions.   Generally, if you work a sedentary job requiring little standing or walking, most patients may return within 2-6 weeks.  Manual labor jobs involving walking, lifting and standing may take 3-4 months.  Your surgeon’s office can provide a release to part-time or light duty work early on in your recovery and progress you to full duty as able.  - Driving - You can begin driving an automatic shift car in 4 to 8 weeks, provided you are no longer taking narcotic pain medication. If you have a stick-shift car and your right hip was replaced, do not begin driving until your doctor says you can.   - Avoiding falls -  throw rugs and tack down loose carpeting.  Be aware of floor hazards such as pets, small objects or uneven surfaces.   -  Airport Metal Detectors - The sensitivity of metal detectors varies and it is likely that your prosthesis will cause an alarm. Inform the  that you have an artificial joint.  Diet  - Resume your normal diet as tolerated.  - It is important to achieve a healthy nutritional status by eating a well balanced diet on a regular basis.  - Your physician may recommend that you take iron and vitamin supplements.   - Continue to drink plenty of fluids.  Shower/Bathing  - You may shower as soon as you get home from the hospital unless otherwise instructed.  - Keep your incision out of water.  To keep the incision dry when showering, cover it with a plastic bag or plastic wrap.  - Pat incision dry if it gets wet.  Don’t rub.  - Do not submerge in a bath until staples are out and the incision is completely healed. (Approximately 6-8 weeks after surgery).  Dressing Change:  Procedure (if recommended by your physician)  - Wash hands.  - Open all dressing change materials.  - Remove old dressing and discard.  - Inspect incision for redness, increase in clear drainage, yellow/green drainage, odor and surrounding skin hot to touch.  -  ABD (large gauze) pad by one corner and lay  over the incision.  Be careful not to touch the inside of the dressing that will lay over the incision.  - Secure in place as instructed (Ace wrap or tape).    Swelling/Bruising  - Swelling is normal after hip replacement and can involve the thigh, knee, calf and foot.  - Swelling can last from 3-6 months.  - Elevate your leg higher than your heart while reclining.  The first week you are home you should elevate your leg an equal amount of time, as you are active.    - Anti-inflammatory pills can be taken once you have stopped the blood thinners.  - The swelling is usually worse after you go home since you are upright for longer periods of time.  - Bruising is common and can involve the entire leg including the thigh, calf and even foot.  Bruising often does not appear until after you arrive home and it can be quite dramatic- purple, black, green.  The bruising you can see is not usually concerning and will subside without any treatment.      Blood Clot Prevention  Blood clots in the legs and the less common, but frightening, clots that travel to the lungs are a real focus of our preventative. Most patients are at standard risk for them, but those patients who are at higher risk include people who have had previous clots, a family history of clotting, smoking, diabetes, obesity, advanced age, use of estrogen and a sedentary lifestyle.    - Signs of blood clots in legs - Swelling in thigh, calf or ankle that does not go down with elevation.  Pain, heat and tenderness in calf, back of calf or groin area.  NOTE: blood clots can occur in either leg.  - You have been receiving anticoagulant therapy (blood thinners) in the hospital and you may be instructed to continue at home depending on your risk factors.  - Your risk for developing a clot continues for up to 2-3 months after surgery.  You should avoid prolonged sitting and dehydration during that time (long air trips and car trips).  If you do take a trip during this  time, please get up and move around every 1- 1.5 hours.  - If you are prescribed blood thinning medication for home, follow instructions as directed. (Handouts provided if applicable).      Activity    Once you get home, you should stay active. The key is not to overdo it! While you can expect some good days and some bad days, you should notice a gradual improvement over time you should notice a gradual improvement and a gradual increase in your endurance over the next 6 to 12 months.    - Weight Bearing - If you have undergone cemented or hybrid hip replacement, you can put some weight on the leg immediately using a cane or walker, and you should continue to use some support for 4 to 6 weeks to help the muscles recover.   - Sleeping Positions - Sleep on your back with your legs slightly apart or on your side with a regular pillow between your knees. Be sure to use the pillow for at least 6 weeks, or until your doctor says you can do without it. Sleeping on your stomach should be all right  - Sitting - For at least the first 3 months, sit only in chairs that have arms. Do not sit on low chairs, low stools, or reclining chairs. Do not cross your legs at the knees. The physical therapist will show you how to sit and stand from a chair, keeping your affected leg out in front of you. Get up and move around on a regular basis--at least once every hour.  - Walking - Walk as much as you like once your doctor gives you the go-ahead, but remember that walking is no substitute for your prescribed exercises. Walking with a pair of trekking poles is helpful and adds as much as 40% to the exercise you get when you walk  - Therapy may be needed in some cases, to strengthen your muscles and improve your gait (walking pattern).  This decision will be made at your post-operative appointment.  Follow your therapist recommended post-operative exercises (handout provided by Therapist).  - Swimming is also recommended; you can begin as  soon as the sutures have been removed and the wound is healed, approximately 6 to 8 weeks after surgery. Using a pair of training fins may make swimming a more enjoyable and effective exercise.  - Other activities - Lower impact activities are preferred.  If you have specific questions, consult your Surgeon.    - Sexual activity - Your surgeon can tell you when it should be safe to resume sexual activity.      When to Call the Doctor   Call the physician if:   - Fever over 100.5? F  - Increased pain, drainage, redness, odor or heat around the incision area  - Shaking chills  - Increased knee pain with activity and rest  - Increased pain in calf, tenderness or redness above or below the knee  - Increased swelling of calf, ankle, foot  - Sudden increased shortness of breath, sudden onset of chest pain, localized chest pain with coughing  - Incision opening  Or, if there are any questions or concerns about medications or care.       -Is this patient being discharged with medication to prevent blood clots?  Yes, Aspirin Aspirin, ASA oral tablets  What is this medicine?  ASPIRIN (AS pir in) is a pain reliever. It is used to treat mild pain and fever. This medicine is also used as directed by a doctor to prevent and to treat heart attacks, to prevent strokes, and to treat arthritis or inflammation.  This medicine may be used for other purposes; ask your health care provider or pharmacist if you have questions.  COMMON BRAND NAME(S): Aspir-Low, Aspir-Karolina, Aspirtab, Yin Advanced Aspirin, Yin Aspirin, Yin Aspirin Extra Strength, Yin Aspirin Plus, Yin Extra Strength, Yin Extra Strength Plus, Yin Genuine Aspirin, Yin Womens Aspirin, Bufferin, Bufferin Extra Strength, Bufferin Low Dose  What should I tell my health care provider before I take this medicine?  They need to know if you have any of these conditions:  -anemia  -asthma  -bleeding problems  -child with chickenpox, the flu, or other viral  infection  -diabetes  -gout  -if you frequently drink alcohol containing drinks  -kidney disease  -liver disease  -low level of vitamin K  -lupus  -smoke tobacco  -stomach ulcers or other problems  -an unusual or allergic reaction to aspirin, tartrazine dye, other medicines, dyes, or preservatives  -pregnant or trying to get pregnant  -breast-feeding  How should I use this medicine?  Take this medicine by mouth with a glass of water. Follow the directions on the package or prescription label. You can take this medicine with or without food. If it upsets your stomach, take it with food. Do not take your medicine more often than directed.  Talk to your pediatrician regarding the use of this medicine in children. While this drug may be prescribed for children as young as 12 years of age for selected conditions, precautions do apply. Children and teenagers should not use this medicine to treat chicken pox or flu symptoms unless directed by a doctor.  Patients over 65 years old may have a stronger reaction and need a smaller dose.  Overdosage: If you think you have taken too much of this medicine contact a poison control center or emergency room at once.  NOTE: This medicine is only for you. Do not share this medicine with others.  What if I miss a dose?  If you are taking this medicine on a regular schedule and miss a dose, take it as soon as you can. If it is almost time for your next dose, take only that dose. Do not take double or extra doses.  What may interact with this medicine?  Do not take this medicine with any of the following medications:  -cidofovir  -ketorolac  -probenecid  This medicine may also interact with the following medications:  -alcohol  -alendronate  -bismuth subsalicylate  -flavocoxid  -herbal supplements like feverfew, garlic, haider, ginkgo biloba, horse chestnut  -medicines for diabetes or glaucoma like acetazolamide, methazolamide  -medicines for gout  -medicines that treat or prevent blood  clots like enoxaparin, heparin, ticlopidine, warfarin  -other aspirin and aspirin-like medicines  -NSAIDs, medicines for pain and inflammation, like ibuprofen or naproxen  -pemetrexed  -sulfinpyrazone  -varicella live vaccine  This list may not describe all possible interactions. Give your health care provider a list of all the medicines, herbs, non-prescription drugs, or dietary supplements you use. Also tell them if you smoke, drink alcohol, or use illegal drugs. Some items may interact with your medicine.  What should I watch for while using this medicine?  If you are treating yourself for pain, tell your doctor or health care professional if the pain lasts more than 10 days, if it gets worse, or if there is a new or different kind of pain. Tell your doctor if you see redness or swelling. Also, check with your doctor if you have a fever that lasts for more than 3 days. Only take this medicine to prevent heart attacks or blood clotting if prescribed by your doctor or health care professional.  Do not take aspirin or aspirin-like medicines with this medicine. Too much aspirin can be dangerous. Always read the labels carefully.  This medicine can irritate your stomach or cause bleeding problems. Do not smoke cigarettes or drink alcohol while taking this medicine. Do not lie down for 30 minutes after taking this medicine to prevent irritation to your throat.  If you are scheduled for any medical or dental procedure, tell your healthcare provider that you are taking this medicine. You may need to stop taking this medicine before the procedure.  This medicine may be used to treat migraines. If you take migraine medicines for 10 or more days a month, your migraines may get worse. Keep a diary of headache days and medicine use. Contact your healthcare professional if your migraine attacks occur more frequently.  What side effects may I notice from receiving this medicine?  Side effects that you should report to your  doctor or health care professional as soon as possible:  -allergic reactions like skin rash, itching or hives, swelling of the face, lips, or tongue  -breathing problems  -changes in hearing, ringing in the ears  -confusion  -general ill feeling or flu-like symptoms  -pain on swallowing  -redness, blistering, peeling or loosening of the skin, including inside the mouth or nose  -signs and symptoms of bleeding such as bloody or black, tarry stools; red or dark-brown urine; spitting up blood or brown material that looks like coffee grounds; red spots on the skin; unusual bruising or bleeding from the eye, gums, or nose  -trouble passing urine or change in the amount of urine  -unusually weak or tired  -yellowing of the eyes or skin  Side effects that usually do not require medical attention (report to your doctor or health care professional if they continue or are bothersome):  -diarrhea or constipation  -headache  -nausea, vomiting  -stomach gas, heartburn  This list may not describe all possible side effects. Call your doctor for medical advice about side effects. You may report side effects to FDA at 1-311-FDA-5070.  Where should I keep my medicine?  Keep out of the reach of children.  Store at room temperature between 15 and 30 degrees C (59 and 86 degrees F). Protect from heat and moisture. Do not use this medicine if it has a strong vinegar smell. Throw away any unused medicine after the expiration date.  NOTE: This sheet is a summary. It may not cover all possible information. If you have questions about this medicine, talk to your doctor, pharmacist, or health care provider.  © 2018 [employer] (2014-08-19 11:30:31)      · Is patient discharged on Warfarin / Coumadin?   No       Total Hip Replacement  Total hip replacement is a surgical procedure to remove damaged bone in your hip joint and replace it with an artificial hip joint (prosthetic hip joint). The purpose of this surgery is to reduce pain  and to improve your hip function.  During a total hip replacement, one or both parts of the hip joint are replaced, depending on the type of joint damage you have. The hip is a ball-and-socket type of joint, and it has two main parts. The ball part of the joint (femoral head) is the top of the thigh bone (femur). The socket part of the joint is a large indent in the side of your pelvis (acetabulum) where the femur and pelvis meet.  Tell a health care provider about:  · Any allergies you have.  · All medicines you are taking, including vitamins, herbs, eye drops, creams, and over-the-counter medicines.  · Any problems you or family members have had with anesthetic medicines.  · Any blood disorders you have.  · Any surgeries you have had.  · Any medical conditions you have.  What are the risks?  Generally, total hip replacement is a safe procedure. However, problems can occur, including:  · Infection.  · Dislocation (the ball of the hip-joint prosthesis comes out of contact with the socket).  · Loosening of the piece (stem) that connects the prosthetic femoral head to the femur.  · Fracture of the bone while inserting the prosthesis.  · Formation of blood clots, which can break loose and travel to and injure your lungs (pulmonary embolus).  What happens before the procedure?  · Plan to have someone take you home after the procedure.  · Do not eat or drink anything after midnight on the night before the procedure or as directed by your health care provider.  · Ask your health care provider about:  ¨ Changing or stopping your regular medicines. This is especially important if you are taking diabetes medicines or blood thinners.  ¨ Taking medicines such as aspirin and ibuprofen. These medicines can thin your blood. Do not take these medicines before your procedure if your health care provider asks you not to.  · Ask your health care provider about how your surgical site will be marked or identified.  · You may be given  antibiotic medicines to help prevent infection.  What happens during the procedure?  · To reduce your risk of infection:  ¨ Your health care team will wash or sanitize their hands.  ¨ Your skin will be washed with soap.  · An IV tube will be inserted into one of your veins. You will be given one or more of the following:  ¨ A medicine that makes you drowsy (sedative).  ¨ A medicine that makes you fall asleep (general anesthetic).  ¨ A medicine injected into your spine that numbs your body below the waist (spinal anesthetic).  · An incision will be made in your hip. Your surgeon will take out any damaged cartilage and bone.  · Your surgeon will then:  ¨ Insert a prosthetic socket into the acetabulum of your pelvis. This is usually secured with screws.  ¨ Remove the femoral head and replace it with a prosthetic ball and stem secured into the top of your femur.  ¨ Place the ball into the socket and check the range of motion and stability of your new hip.  ¨ Close the incision and apply a bandage over the surgical site.  What happens after the procedure?  · You will stay in a recovery area until the medicines have worn off.  · Your vital signs, such as your pulse and blood pressure, will be monitored.  · Once you are awake and stable, you will be taken to a hospital room.  · You may be directed to take actions to help prevent blood clots. These may include:  ¨ Walking soon after surgery, with someone assisting you. Moving around after surgery helps to improve blood flow.  ¨ Taking medicines to thin your blood (anticoagulants).  ¨ Wearing compression stockings or using different types of devices.  · You will receive physical therapy until you are doing well and your health care provider feels it is safe for you to go home.  This information is not intended to replace advice given to you by your health care provider. Make sure you discuss any questions you have with your health care provider.  Document Released:  03/26/2002 Document Revised: 08/21/2017 Document Reviewed: 02/18/2015  [employer] Patient Education © 2017 [employer]    Depression / Suicide Risk    As you are discharged from this [facility] facility, it is important to learn how to keep safe from harming yourself.    Recognize the warning signs:  · Abrupt changes in personality, positive or negative- including increase in energy   · Giving away possessions  · Change in eating patterns- significant weight changes-  positive or negative  · Change in sleeping patterns- unable to sleep or sleeping all the time   · Unwillingness or inability to communicate  · Depression  · Unusual sadness, discouragement and loneliness  · Talk of wanting to die  · Neglect of personal appearance   · Rebelliousness- reckless behavior  · Withdrawal from people/activities they love  · Confusion- inability to concentrate     If you or a loved one observes any of these behaviors or has concerns about self-harm, here's what you can do:  · Talk about it- your feelings and reasons for harming yourself  · Remove any means that you might use to hurt yourself (examples: pills, rope, extension cords, firearm)  · Get professional help from the community (Mental Health, Substance Abuse, psychological counseling)  · Do not be alone:Call your Safe Contact- someone whom you trust who will be there for you.  · Call your local CRISIS HOTLINE 207-9086 or 854-089-2911  · Call your local Children's Mobile Crisis Response Team [place] (555) 296-4692 or www.[employer]  · Call the toll free National Suicide Prevention Hotlines   · National Suicide Prevention Lifeline 781-627-JGBK (0471)  · National Hope Line Network 800-SUICIDE (857-8061)

## 2018-03-23 NOTE — DISCHARGE SUMMARY
Karine Ovalle was admitted on 3/20/2018 for UNILATERAL PRIMARY OSTEOARTHRITIS RIGHT HIP  Primary osteoarthritis of right hip  Patient was diagnosed with severe degenerative arthritis in the right hip and underwent a right total hip arthroplasty by Dr. Bryon Levine on the date of admission.    Hospital course:     The patient has done well, with no complications.  Patient denies chest pain, calf pain or shortness of breath.   Pain is well-controlled at present.  Patient is ambulating well with the use of an assistive device with 50% weight bearing to right leg, and progressing in physical therapy.   Patient is neurologically and vascularly intact with palpable pedal pulses bilaterally.      Discharge date: 3/23/18    Patient is being discharged to [facility] today.     Allergies:  Zofran [dextrose-ondansetron]       Medication List      START taking these medications      Instructions   docusate sodium 100 MG Caps   Take 100 mg by mouth 2 Times a Day.  Dose:  100 mg     magnesium hydroxide 400 MG/5ML Susp  Commonly known as:  MILK OF MAGNESIA   Take 30 mL by mouth 1 time daily as needed (if sennosides, docusate, and/or polyethylene glycol 3350 ineffective or not ordered).  Dose:  30 mL        CHANGE how you take these medications      Instructions   Lidocaine HCl 3 % Crea  What changed:  additional instructions   Apply cream to affected area 2 times daily as needed        CONTINUE taking these medications      Instructions   cyanocobalamin 1000 MCG Tabs  Commonly known as:  VITAMIN B12   Take 1 Tab by mouth every day.  Dose:  1000 mcg     furosemide 20 MG Tabs  Commonly known as:  LASIX   Take 1 Tab by mouth every day.  Dose:  20 mg     Ibuprofen 200 MG Caps   Take 2 Caps by mouth as needed.  Dose:  2 Cap     metaxalone 800 MG Tabs  Commonly known as:  SKELAXIN   Take 1 Tab by mouth 3 times a day for 90 days.  Dose:  800 mg     morphine ER 30 MG Tbcr tablet  Start taking on:  3/28/2018  Commonly  known as:  MS CONTIN   Take 1 Tab by mouth every 12 hours for 3 days.  Dose:  30 mg     multivitamin Tabs   Take 1 Tab by mouth every day.  Dose:  1 Tab     omeprazole 20 MG delayed-release capsule  Commonly known as:  PRILOSEC   Take 1 Cap by mouth every day.  Dose:  20 mg     oxyCODONE-acetaminophen  MG Tabs  Start taking on:  3/28/2018  Commonly known as:  PERCOCET-10   Take 1 Tab by mouth every 6 hours as needed for Severe Pain for up to 3 days.  Dose:  1 Tab     potassium chloride ER 10 MEQ tablet  Commonly known as:  KLOR-CON   Take 1 Tab by mouth every day.  Dose:  10 mEq     sulfamethoxazole-trimethoprim 800-160 MG tablet  Commonly known as:  BACTRIM DS   Take 1 Tab by mouth 2 times a day.  Dose:  1 Tab     vitamin D 1000 UNIT Tabs  Commonly known as:  cholecalciferol   Take 1,000 Units by mouth every day.  Dose:  1000 Units            Discharge Instructions:     Patient is instructed to ambulate and weight bear 50% on right leg with the use of an assistive device, and to continue physical therapy exercises given during this hospital stay.   Patient is to ice and elevate the surgical leg regularly, with pillows under the ankle, nothing is to be placed under the knee.   Patient was given detailed wound care instructions, and will leave the silver dressing on until first post-op visit.   Aspirin twice daily for DVT prophylaxis.  Patient is to follow up with Dr. Levine's office in 1-2 weeks.

## 2018-03-23 NOTE — CARE PLAN
Problem: Pain Management  Goal: Pain level will decrease to patient's comfort goal  Outcome: PROGRESSING AS EXPECTED      Problem: Safety  Goal: Will remain free from injury  Outcome: PROGRESSING AS EXPECTED  Treaded socks in place, bed in the lowest position, call light and belongings within reach, pt call for assistance appropriately

## 2018-03-23 NOTE — PROGRESS NOTES
Pt. D/C'd to [facility].  Discharge instructions provided to pt.  Pt states understanding.  Pt states all questions have been answered.  Copy of discharge provided to pt.  Signed copy in chart.  Prescriptions provided to pt, copy in chart. Pt states that all personal belongings are in possession. Pt escorted off unit with assistance from [employer] [facility] medical transportation without incident.

## 2018-03-23 NOTE — THERAPY
"Physical Therapy Treatment completed.   Bed Mobility:  Supine to Sit: Minimal Assist  Transfers: Sit to Stand: Minimal Assist  Gait: Level Of Assist: Contact Guard Assist with Front-Wheel Walker       Plan of Care: Will benefit from Physical Therapy 5 times per week  Discharge Recommendations: Equipment: Will Continue to Assess for Equipment Needs.     See \"Rehab Therapy-Acute\" Patient Summary Report for complete documentation.     Pt is still presenting w/ functional limitations secondary to pain. She was fixated on her incisional pain today and had a hard time completing exercises. Once in chair, pt was more open for exercises w/ frequent rest breaks. She is still demonstrating significant weakness in the R LE that is hindering bed mobility and adjusting herself in the bed and chair for comfort. Discussed expected pain w/ surgery w/ pt receptive and more open to do standing activities as her fear was that the incision would bust open. As per initial eval, pt will benefit from post acute therapy.  "

## 2018-03-23 NOTE — PROGRESS NOTES
Assumed care of patient following shift report. Patient is A&Ox4. She reports pain to right hip for which RN is to medicate. Patient is on room air and in no acute distress. Plan of care and safety precautions reviewed. Dressing to right hip is CDI. Patient is PWB to RLE. Patient agrees to call for assistance out of bed. Call device in reach. Will continue to monitor.

## 2018-04-18 ENCOUNTER — PATIENT MESSAGE (OUTPATIENT)
Dept: MEDICAL GROUP | Age: 53
End: 2018-04-18

## 2018-04-19 ENCOUNTER — HOME HEALTH ADMISSION (OUTPATIENT)
Dept: HOME HEALTH SERVICES | Facility: HOME HEALTHCARE | Age: 53
End: 2018-04-19
Payer: COMMERCIAL

## 2018-04-23 ENCOUNTER — TELEPHONE (OUTPATIENT)
Dept: MEDICAL GROUP | Age: 53
End: 2018-04-23

## 2018-04-23 ENCOUNTER — HOME CARE VISIT (OUTPATIENT)
Dept: HOME HEALTH SERVICES | Facility: HOME HEALTHCARE | Age: 53
End: 2018-04-23
Payer: COMMERCIAL

## 2018-04-23 NOTE — TELEPHONE ENCOUNTER
1. Caller Name: [employer]- customer service                                         Call Back Number: [phone]    2. [employer] paperwork received from customer service requiring provider signature.     3. All appropriate fields completed by Medical Assistant: N/A CMA printed and distributed to MA    4. Paperwork placed in MA folder/basket to process.

## 2018-04-25 ENCOUNTER — TELEPHONE (OUTPATIENT)
Dept: VASCULAR LAB | Facility: MEDICAL CENTER | Age: 53
End: 2018-04-25

## 2018-04-25 ENCOUNTER — OFFICE VISIT (OUTPATIENT)
Dept: MEDICAL GROUP | Age: 53
End: 2018-04-25
Payer: COMMERCIAL

## 2018-04-25 VITALS
BODY MASS INDEX: 40.22 KG/M2 | DIASTOLIC BLOOD PRESSURE: 72 MMHG | WEIGHT: 227 LBS | TEMPERATURE: 98.8 F | HEART RATE: 100 BPM | SYSTOLIC BLOOD PRESSURE: 115 MMHG | OXYGEN SATURATION: 99 % | HEIGHT: 63 IN

## 2018-04-25 DIAGNOSIS — I10 ESSENTIAL HYPERTENSION: ICD-10-CM

## 2018-04-25 DIAGNOSIS — E66.01 MORBID OBESITY WITH BMI OF 40.0-44.9, ADULT (HCC): ICD-10-CM

## 2018-04-25 DIAGNOSIS — R26.2 DISABILITY OF WALKING: ICD-10-CM

## 2018-04-25 DIAGNOSIS — M51.36 DDD (DEGENERATIVE DISC DISEASE), LUMBAR: ICD-10-CM

## 2018-04-25 DIAGNOSIS — I89.0 LYMPHEDEMA: ICD-10-CM

## 2018-04-25 DIAGNOSIS — Z12.11 SCREENING FOR COLORECTAL CANCER: ICD-10-CM

## 2018-04-25 DIAGNOSIS — J45.20 MILD INTERMITTENT ASTHMA WITHOUT COMPLICATION: ICD-10-CM

## 2018-04-25 DIAGNOSIS — M16.11 PRIMARY OSTEOARTHRITIS OF RIGHT HIP: ICD-10-CM

## 2018-04-25 DIAGNOSIS — Z12.12 SCREENING FOR COLORECTAL CANCER: ICD-10-CM

## 2018-04-25 DIAGNOSIS — F11.20 UNCOMPLICATED OPIOID DEPENDENCE (HCC): ICD-10-CM

## 2018-04-25 DIAGNOSIS — R53.81 DEBILITY: ICD-10-CM

## 2018-04-25 PROBLEM — M51.369 DDD (DEGENERATIVE DISC DISEASE), LUMBAR: Status: ACTIVE | Noted: 2018-04-25

## 2018-04-25 PROCEDURE — 99214 OFFICE O/P EST MOD 30 MIN: CPT | Performed by: INTERNAL MEDICINE

## 2018-04-25 RX ORDER — OXYCODONE AND ACETAMINOPHEN 10; 325 MG/1; MG/1
1-2 TABLET ORAL EVERY 8 HOURS PRN
COMMUNITY
Start: 2018-03-29 | End: 2018-06-07

## 2018-04-25 RX ORDER — MORPHINE SULFATE 30 MG/1
30 TABLET, FILM COATED, EXTENDED RELEASE ORAL EVERY 12 HOURS
COMMUNITY
Start: 2018-03-28 | End: 2018-05-08 | Stop reason: SDUPTHER

## 2018-04-25 RX ORDER — FUROSEMIDE 40 MG/1
40 TABLET ORAL DAILY
COMMUNITY
End: 2018-06-07 | Stop reason: SDUPTHER

## 2018-04-25 ASSESSMENT — ENCOUNTER SYMPTOMS
GASTROINTESTINAL NEGATIVE: 1
PSYCHIATRIC NEGATIVE: 1
RESPIRATORY NEGATIVE: 1
NEUROLOGICAL NEGATIVE: 1
CARDIOVASCULAR NEGATIVE: 1
MUSCULOSKELETAL NEGATIVE: 1
CONSTITUTIONAL NEGATIVE: 1
EYES NEGATIVE: 1

## 2018-04-25 NOTE — TELEPHONE ENCOUNTER
Received message from patient that her legs have been swollen for the better part of a month after surgery.  Patient with known lymphedema  I attempted to call her, but was able to reach her. Asked her to call back    When she calls back we should probably arrange for her to be evaluated in the office and get her into lymphedema massage therapy through physical therapy as soon as possible    Await for the patient contact    Michael J. Bloch, MD  [facility]

## 2018-04-26 ENCOUNTER — HOME CARE VISIT (OUTPATIENT)
Dept: HOME HEALTH SERVICES | Facility: HOME HEALTHCARE | Age: 53
End: 2018-04-26
Payer: COMMERCIAL

## 2018-04-26 ENCOUNTER — TELEPHONE (OUTPATIENT)
Dept: MEDICAL GROUP | Age: 53
End: 2018-04-26

## 2018-04-26 VITALS
OXYGEN SATURATION: 97 % | HEART RATE: 88 BPM | BODY MASS INDEX: 40.4 KG/M2 | RESPIRATION RATE: 20 BRPM | TEMPERATURE: 99.4 F | SYSTOLIC BLOOD PRESSURE: 116 MMHG | DIASTOLIC BLOOD PRESSURE: 68 MMHG | HEIGHT: 63 IN | WEIGHT: 228 LBS

## 2018-04-26 DIAGNOSIS — M54.41 CHRONIC BILATERAL LOW BACK PAIN WITH BILATERAL SCIATICA: ICD-10-CM

## 2018-04-26 DIAGNOSIS — G89.29 CHRONIC BILATERAL LOW BACK PAIN WITH BILATERAL SCIATICA: ICD-10-CM

## 2018-04-26 DIAGNOSIS — M54.42 CHRONIC BILATERAL LOW BACK PAIN WITH BILATERAL SCIATICA: ICD-10-CM

## 2018-04-26 PROCEDURE — 665001 SOC-HOME HEALTH

## 2018-04-26 PROCEDURE — G0493 RN CARE EA 15 MIN HH/HOSPICE: HCPCS

## 2018-04-26 SDOH — ECONOMIC STABILITY: HOUSING INSECURITY
HOME SAFETY: PT HAS MULTIPLE ITEMS LEFT ON FLOOR FROM RECENT MOVE, SEVERAL THROW RUGS, NO GRAB BARS IN SHOWER, AND 3 SMALL DOGS THAT CREATES A POTENTIAL RISK AS A TRIP/SLIP HAZARD.

## 2018-04-26 SDOH — ECONOMIC STABILITY: HOUSING INSECURITY: UNSAFE COOKING RANGE AREA: 0

## 2018-04-26 SDOH — ECONOMIC STABILITY: HOUSING INSECURITY: UNSAFE APPLIANCES: 0

## 2018-04-26 ASSESSMENT — PATIENT HEALTH QUESTIONNAIRE - PHQ9
2. FEELING DOWN, DEPRESSED, IRRITABLE, OR HOPELESS: 00
1. LITTLE INTEREST OR PLEASURE IN DOING THINGS: 00

## 2018-04-26 ASSESSMENT — ENCOUNTER SYMPTOMS
NAUSEA: DENIES
VOMITING: DENIES

## 2018-04-26 ASSESSMENT — ACTIVITIES OF DAILY LIVING (ADL): HOME_HEALTH_OASIS: 01

## 2018-04-26 NOTE — PROGRESS NOTES
Subjective:      Karine Ovalle is a 52 y.o. female who presents with Medication Management (swollen x2 weeks)  The patient is here for followup of chronic medical problems listed below. The patient is compliant with medications and having no side effects from them. Denies chest pain, abdominal pain, dyspnea, myalgias, or cough.   Patient Active Problem List    Diagnosis Date Noted   • Mild intermittent asthma without complication 04/25/2012     Priority: High   • DDD (degenerative disc disease), lumbar 04/25/2018   • Morbid obesity with BMI of 40.0-44.9, adult ([facility]) 04/25/2018   • Status post right hip replacement 03/20/2018   • Disability of walking- due to severe bilateral hip DJD- needs bilat THR- placed on full disability 2/5/18-2/5/19 02/05/2018   • Uncomplicated opioid dependence (CMS-HCC)- pcp dr hackett to rx 01/31/2018   • Essential hypertension 01/31/2018   • Primary osteoarthritis of right hip- severe; THR done 3/20/18- DR JERRY 01/22/2018   • Lymphedema 10/17/2017   • Debility- FMLA through april 2018 10/12/2017   • Venous insufficiency 10/03/2017   • Colon cancer screening- needs 08/09/2017   • Chronic bilateral low back pain with bilateral sciatica- pain mgt 07/26/2017   • Obesity, morbid, BMI 40.0-49.9 (CMS-HCC)- s/p gastric bypass 10/21/2016   • Vitamin B 12 deficiency 06/02/2016   • Fibroids 08/13/2013     A.LL    Outpatient Medications Prior to Visit   Medication Sig Dispense Refill   • cyanocobalamin (VITAMIN B12) 1000 MCG Tab Take 1 Tab by mouth every day. 90 Tab 4   • docusate sodium 100 MG Cap Take 100 mg by mouth 2 Times a Day. 60 Cap    • magnesium hydroxide (MILK OF MAGNESIA) 400 MG/5ML Suspension Take 30 mL by mouth 1 time daily as needed (if sennosides, docusate, and/or polyethylene glycol 3350 ineffective or not ordered). 1 Bottle    • Ibuprofen 200 MG Cap Take 2 Caps by mouth as needed.     • sulfamethoxazole-trimethoprim (BACTRIM DS) 800-160 MG tablet Take 1 Tab by mouth 2 times a  "day. 28 Tab 1   • metaxalone (SKELAXIN) 800 MG Tab Take 1 Tab by mouth 3 times a day for 90 days. 90 Tab 4   • omeprazole (PRILOSEC) 20 MG delayed-release capsule Take 1 Cap by mouth every day. 30 Cap 0   • furosemide (LASIX) 20 MG Tab Take 1 Tab by mouth every day. 90 Tab 4   • potassium chloride ER (KLOR-CON) 10 MEQ tablet Take 1 Tab by mouth every day. 90 Tab 4   • Lidocaine HCl 3 % Cream Apply cream to affected area 2 times daily as needed (Patient taking differently: Apply cream to affected area 2 times daily as needed) 1 Tube 0   • multivitamin (THERAGRAN) Tab Take 1 Tab by mouth every day.     • vitamin D (CHOLECALCIFEROL) 1000 UNIT Tab Take 1,000 Units by mouth every day.       No facility-administered medications prior to visit.              HPI    Review of Systems   Constitutional: Negative.    HENT: Negative.    Eyes: Negative.    Respiratory: Negative.    Cardiovascular: Negative.    Gastrointestinal: Negative.    Genitourinary: Negative.    Musculoskeletal: Negative.    Skin: Negative.    Neurological: Negative.    Endo/Heme/Allergies: Negative.    Psychiatric/Behavioral: Negative.           Objective:     /72   Pulse 100   Temp 37.1 °C (98.8 °F)   Ht 1.6 m (5' 2.99\")   Wt 103 kg (227 lb)   LMP 05/17/2017   SpO2 99%   BMI 40.22 kg/m²      Physical Exam   Constitutional: She is oriented to person, place, and time. She appears well-developed and well-nourished. No distress.   HENT:   Head: Normocephalic and atraumatic.   Right Ear: External ear normal.   Left Ear: External ear normal.   Nose: Nose normal.   Mouth/Throat: Oropharynx is clear and moist. No oropharyngeal exudate.   Eyes: Conjunctivae and EOM are normal. Pupils are equal, round, and reactive to light. Right eye exhibits no discharge. Left eye exhibits no discharge. No scleral icterus.   Neck: Normal range of motion. Neck supple. No JVD present. No tracheal deviation present. No thyromegaly present.   Cardiovascular: Normal rate, " regular rhythm, normal heart sounds and intact distal pulses.  Exam reveals no gallop and no friction rub.    No murmur heard.  Pulmonary/Chest: Effort normal and breath sounds normal. No stridor. No respiratory distress. She has no wheezes. She has no rales. She exhibits no tenderness.   Abdominal: Soft. Bowel sounds are normal. She exhibits no distension and no mass. There is no tenderness. There is no rebound and no guarding.   Musculoskeletal: Normal range of motion. She exhibits no edema or tenderness.   Lymphadenopathy:     She has no cervical adenopathy.   Neurological: She is alert and oriented to person, place, and time. She has normal reflexes. She displays normal reflexes. No cranial nerve deficit. She exhibits normal muscle tone. Coordination normal.   Skin: Skin is warm and dry. No rash noted. She is not diaphoretic. No erythema. No pallor.   Psychiatric: She has a normal mood and affect. Her behavior is normal. Judgment and thought content normal.   Vitals reviewed.         No visits with results within 1 Month(s) from this visit.   Latest known visit with results is:   Admission on 03/20/2018, Discharged on 03/23/2018   Component Date Value   • Hemoglobin 03/21/2018 8.0*   • Hematocrit 03/21/2018 23.5*   • Hemoglobin 03/22/2018 7.5*   • Hematocrit 03/22/2018 22.0*   • Hemoglobin 03/23/2018 7.5*   • Hematocrit 03/23/2018 22.0*      Lab Results   Component Value Date/Time    HBA1C 5.3 06/12/2017 09:03 AM     Lab Results   Component Value Date/Time    SODIUM 134 (L) 03/07/2018 05:50 PM    POTASSIUM 3.8 03/07/2018 05:50 PM    CHLORIDE 101 03/07/2018 05:50 PM    CO2 24 03/07/2018 05:50 PM    GLUCOSE 79 03/07/2018 05:50 PM    BUN 11 03/07/2018 05:50 PM    CREATININE 0.71 03/07/2018 05:50 PM    CREATININE 0.88 08/14/2010 12:00 AM    BUNCREATRAT 13 08/14/2010 12:00 AM    GLOMRATE >59 08/14/2010 12:00 AM    ALKPHOSPHAT 90 02/22/2018 05:05 PM    ASTSGOT 16 02/22/2018 05:05 PM    ALTSGPT 8 02/22/2018 05:05 PM     TBILIRUBIN 0.4 02/22/2018 05:05 PM     Lab Results   Component Value Date/Time    INR 1.04 05/18/2012 12:20 PM     Lab Results   Component Value Date/Time    CHOLSTRLTOT 168 02/22/2018 05:05 PM    LDL 84 02/22/2018 05:05 PM    HDL 67 02/22/2018 05:05 PM    TRIGLYCERIDE 85 02/22/2018 05:05 PM       No results found for: TESTOSTERONE  Lab Results   Component Value Date/Time    TSH 2.110 08/14/2010 12:00 AM     Lab Results   Component Value Date/Time    FREET4 0.72 05/03/2016 07:32 AM     Lab Results   Component Value Date/Time    URICACID 5.6 08/12/2014 04:49 PM     No components found for: VITB12  Lab Results   Component Value Date/Time    25HYDROXY 33 08/12/2014 04:49 PM    25HYDROXY 25 (L) 07/03/2012 09:20 AM     '    Assessment/Plan:     1. DDD (degenerative disc disease), lumbar   Under good control. Continue same regimen.      2. Screening for colorectal cancer Under good control. Continue same regimen.   - REFERRAL TO GI FOR COLONOSCOPY  - OCCULT BLOOD FECES IMMUNOASSAY (FIT); Future    3. Morbid obesity with BMI of 40.0-44.9, adult (CMS-HCC)   diet/exercise/lose 15 lbs.; patient counseled  - Patient identified as having weight management issue.  Appropriate orders and counseling given.    4. Mild intermittent asthma without complication    Under good control. Continue same regimen.    5. Essential hypertension    Under good control. Continue same regimen.    6. Disability of walking- due to severe bilateral hip DJD- needs bilat THR- placed on full disability 2/5/18-2/5/19    Under good control. Continue same regimen.    7. Lymphedema     Under good control. Continue same regimen.   Under good control. Continue same regimen.  8. Debility- FMLA through april 2018       Under good control. Continue same regimen.    9. Primary osteoarthritis of right hip- severe; THR done 3/20/18- DR JERRY     Under good control. Continue same regimen.    10. Uncomplicated opioid dependence (CMS-HCC)- pcp dr hackett to rx    Under  good control. Continue same regimen.      40 minute face-to-face encounter took place today.  More than half of this time was spent in the coordination of care of the above problems, as well as counseling.

## 2018-04-26 NOTE — TELEPHONE ENCOUNTER
1. Caller Name: Cesar Mcneill - [facility]                                         Call Back Number: N/A         [facility] wanted to inform that pt have continues chronic pain after her recent hip surgery. Pain 7/10 on her lower back.

## 2018-04-26 NOTE — TELEPHONE ENCOUNTER
Thanks for the update- pt should call her surgeon. She is going to need a pain mgt doctor- referred

## 2018-04-27 ENCOUNTER — TELEPHONE (OUTPATIENT)
Dept: HEALTH INFORMATION MANAGEMENT | Facility: OTHER | Age: 53
End: 2018-04-27

## 2018-04-27 NOTE — TELEPHONE ENCOUNTER
Referral from [facility]. Med review completed. No significant issues noted.

## 2018-04-27 NOTE — TELEPHONE ENCOUNTER
"From: Karine Ovalle  To: Micky Rubin M.D.  Sent: 4/18/2018 1:12 PM PDT  Subject: Non-Urgent Medical Question    Dear ,    Please complete the form that was faxed to your office in 3/29/2018...[phone] & [phone].  The form is called \"Physician's Certification for Catastrophic Leave Request\" . 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    Please see the faxed Submitted that was sent to you on 3/29/18, as I attached a written explanation of why & how I needed this form to be completed.    My employer is still waiting on the form from you. To be sent to: Emily Syed @ [phone]/Fax#  ([facility] Dept of Welfare.    Sincerely,  Darshan Ovalle  "

## 2018-04-27 NOTE — TELEPHONE ENCOUNTER
Form done and ready to be scanned in to media and faxed to Emily Syed at Dept of Welfare, [facility]. At [phone]

## 2018-04-30 ENCOUNTER — HOME CARE VISIT (OUTPATIENT)
Dept: HOME HEALTH SERVICES | Facility: HOME HEALTHCARE | Age: 53
End: 2018-04-30
Payer: COMMERCIAL

## 2018-04-30 ENCOUNTER — OFFICE VISIT (OUTPATIENT)
Dept: VASCULAR LAB | Facility: MEDICAL CENTER | Age: 53
End: 2018-04-30
Attending: NURSE PRACTITIONER
Payer: COMMERCIAL

## 2018-04-30 VITALS
DIASTOLIC BLOOD PRESSURE: 60 MMHG | OXYGEN SATURATION: 90 % | RESPIRATION RATE: 20 BRPM | SYSTOLIC BLOOD PRESSURE: 100 MMHG | TEMPERATURE: 97.6 F | HEART RATE: 90 BPM

## 2018-04-30 VITALS
SYSTOLIC BLOOD PRESSURE: 118 MMHG | BODY MASS INDEX: 39.44 KG/M2 | HEIGHT: 63 IN | DIASTOLIC BLOOD PRESSURE: 71 MMHG | WEIGHT: 222.6 LBS | HEART RATE: 101 BPM

## 2018-04-30 DIAGNOSIS — I89.0 LYMPHEDEMA: ICD-10-CM

## 2018-04-30 DIAGNOSIS — Z96.641 STATUS POST RIGHT HIP REPLACEMENT: ICD-10-CM

## 2018-04-30 DIAGNOSIS — I10 ESSENTIAL HYPERTENSION: ICD-10-CM

## 2018-04-30 DIAGNOSIS — R53.81 DEBILITY: ICD-10-CM

## 2018-04-30 PROCEDURE — G0495 RN CARE TRAIN/EDU IN HH: HCPCS

## 2018-04-30 PROCEDURE — 99212 OFFICE O/P EST SF 10 MIN: CPT | Performed by: NURSE PRACTITIONER

## 2018-04-30 PROCEDURE — 99213 OFFICE O/P EST LOW 20 MIN: CPT | Performed by: NURSE PRACTITIONER

## 2018-04-30 ASSESSMENT — ENCOUNTER SYMPTOMS
HEADACHES: 0
DEBILITATING PAIN: 1
BLURRED VISION: 0
CLAUDICATION: 0
BACK PAIN: 1
DIZZINESS: 0
FOCAL WEAKNESS: 0
POOR JUDGMENT: 1
DEPRESSION: 0
DOUBLE VISION: 0
PALPITATIONS: 0
SPEECH CHANGE: 0
EYE PAIN: 0

## 2018-04-30 NOTE — TELEPHONE ENCOUNTER
Phone Number Called: [phone] (home) [phone] (work)    Message: Tried to call pt. Unable to leave voicemail due to voice mail not being set up.    Left Message for patient to call back: no

## 2018-04-30 NOTE — PROGRESS NOTES
"  Follow Up VASCULAR VISIT  Subjective:   Karine Ovalle is a 52 y.o. female who presents today 04/30/2018 for   Chief Complaint   Patient presents with   • Follow-Up       HPI:  Pt here today for initial evaluation of worsening chronic venous insufficiency and lymphedema.   Hip surgery completed swelling and lymphedema worse since surgery  Wants referral to specialist  Has seen PT in the past, has wrapped her legs in the past but is not doing it now  No history of blood clots  Denies CP, SOB, palpitations  Not taking BP at home  Using walker for ambulation  Denies skin breakdown    History   Smoking Status   • Never Smoker   Smokeless Tobacco   • Never Used     Social History   Substance Use Topics   • Smoking status: Never Smoker   • Smokeless tobacco: Never Used   • Alcohol use No       DIET AND EXERCISE:  Weight Change: none  Diet: common adult  Exercise: no regular exercise program     Review of Systems   Constitutional: Positive for malaise/fatigue.   Eyes: Negative for blurred vision, double vision and pain.   Cardiovascular: Positive for leg swelling. Negative for chest pain, palpitations and claudication.   Genitourinary: Negative for frequency, hematuria and urgency.   Musculoskeletal: Positive for back pain and joint pain.   Skin: Negative for itching.   Neurological: Negative for dizziness, speech change, focal weakness and headaches.   Psychiatric/Behavioral: Negative for depression.      Objective:     Vitals:    04/30/18 1007   BP: 118/71   Pulse: (!) 101   Weight: 101 kg (222 lb 9.6 oz)   Height: 1.6 m (5' 3\")      Body mass index is 39.43 kg/m².  Physical Exam   Constitutional: She is oriented to person, place, and time. She appears well-developed and well-nourished.   HENT:   Head: Normocephalic and atraumatic.   Eyes: Pupils are equal, round, and reactive to light.   Neck: Normal range of motion. No JVD present. No thyromegaly present.   Cardiovascular: Normal rate, regular rhythm and normal " heart sounds.    No murmur heard.  Pulmonary/Chest: Effort normal and breath sounds normal. No respiratory distress. She has no wheezes. She has no rales. She exhibits no tenderness.   Musculoskeletal: Normal range of motion. She exhibits edema. She exhibits no tenderness or deformity.   Severe coretta leg swelling & lymphedema     Neurological: She is alert and oriented to person, place, and time. Coordination normal.   Skin: Skin is warm and dry.   Psychiatric: She has a normal mood and affect. Her behavior is normal.   Vitals reviewed.    Echo March 2018    No prior study is available for comparison.   Normal left ventricular systolic function.  Left ventricular ejection fraction is visually estimated to be 60%.  Normal diastolic function.  Normal inferior vena cava size and inspiratory collapse.    Lab Results   Component Value Date    CHOLSTRLTOT 168 02/22/2018    LDL 84 02/22/2018    HDL 67 02/22/2018    TRIGLYCERIDE 85 02/22/2018         Lab Results   Component Value Date    HBA1C 5.3 06/12/2017      Lab Results   Component Value Date    SODIUM 134 (L) 03/07/2018    POTASSIUM 3.8 03/07/2018    CHLORIDE 101 03/07/2018    CO2 24 03/07/2018    GLUCOSE 79 03/07/2018    BUN 11 03/07/2018    CREATININE 0.71 03/07/2018    IFA[place] >60 03/07/2018    IFNOTAFR >60 03/07/2018        Lab Results   Component Value Date    WBC 8.0 03/07/2018    RBC 4.61 03/07/2018    HEMOGLOBIN 7.5 (L) 03/23/2018    HEMATOCRIT 22.0 (L) 03/23/2018    MCV 82.0 03/07/2018    MCH 28.4 03/07/2018    MCHC 34.7 03/07/2018    MPV 9.8 03/07/2018      Medical Decision Making:  Today's Assessment / Status / Plan:     1. Debility- FMLA through april 2018     2. Status post right hip replacement  REFERRAL TO WOUND CLINIC   3. Lymphedema  REFERRAL TO WOUND CLINIC    REFERRAL TO VASCULAR SURGERY   4. Essential hypertension  COMP METABOLIC PANEL     Patient Type: Primary Prevention    Etiology of Established CVD if Present:     Lipid Management: Qualifies for  Statin Therapy Based on 2013 ACC/AHA Guidelines: unknown  Calculated 10-Year Risk of ASCVD: 2.3%  Currently on Statin: No  Lipid panel shows optimal control without statin therapy LDL 84, nonHDL 101  - Continue therapeutic lifestyle changes especially increased exercise following hip replacement  - Check lipid panel in 6 months.    Blood Pressure Management:Goal: ACC/MIKAEL 130/80; Under Control: yes   Appears controlled at other office visits.  - Encouraged occasional home monitoring.  - Continue Lasix  - CMP prior to next visit    Glycemic Status: Normal    Anti-Platelet/Anti-Coagulant Tx: no    Smoking: Continue complete cessation     Physical Activity: Advised to engage in walking, referred to physical therapy, frequency : goal is  3-4 times a week.  Stressed the importance of walking for improved circulation and weight loss.      Weight Management and Nutrition: Dietary plan was discussed with patient at this visit including Mediterranean style diet, low carbohydrate.    Other:   1. Lymphedema-  Explained treatment with continued wrapping with PT, elevation, use of compression stockings and increase in exercise as much as possible.  Other potential options include restarting lymphedema massage which has been very helpful for her in the past.  Pt would like to pursue home compression pumps. Pt wants a referral to a specialist for possible corrective procedure. I explained that I could refer her but that there might not be a true treatment for her lymphedema. She is aware of this but would like a referral no the less. Put on Lasix while in the hospital  - Referral to wound clinic for lymphedema  - Foam wedge for leg elevation  - Referral to [facility] for evaluation and treatment of lymphedema  - Lymphedema pump for home use  - Continue lasix         Instructed to follow-up with PCP for remainder of adult medical needs: yes  We will partner with other providers in the management of established vascular disease  and cardiometabolic risk factors.    Studies to Be Obtained: None at this time  Labs to Be Obtained: CMP    Follow up in: 2 months    ЮЛИЯ Martin.

## 2018-05-01 ENCOUNTER — HOME CARE VISIT (OUTPATIENT)
Dept: HOME HEALTH SERVICES | Facility: HOME HEALTHCARE | Age: 53
End: 2018-05-01
Payer: COMMERCIAL

## 2018-05-01 VITALS
DIASTOLIC BLOOD PRESSURE: 76 MMHG | TEMPERATURE: 99.8 F | OXYGEN SATURATION: 98 % | RESPIRATION RATE: 16 BRPM | HEART RATE: 89 BPM | SYSTOLIC BLOOD PRESSURE: 122 MMHG

## 2018-05-01 PROCEDURE — G0151 HHCP-SERV OF PT,EA 15 MIN: HCPCS

## 2018-05-01 PROCEDURE — G0152 HHCP-SERV OF OT,EA 15 MIN: HCPCS

## 2018-05-01 SDOH — ECONOMIC STABILITY: HOUSING INSECURITY: HOME SAFETY: PT'S HOME IS VERY CLUTTERED.

## 2018-05-01 ASSESSMENT — ACTIVITIES OF DAILY LIVING (ADL)
ADLS_COMMENTS: <!--EPICS-->SEE OT REPORT<!--EPICE-->
IADLS_COMMENTS: <!--EPICS-->SEE OT REPORT<!--EPICE-->

## 2018-05-01 ASSESSMENT — ENCOUNTER SYMPTOMS: DEBILITATING PAIN: 1

## 2018-05-02 ENCOUNTER — HOME CARE VISIT (OUTPATIENT)
Dept: HOME HEALTH SERVICES | Facility: HOME HEALTHCARE | Age: 53
End: 2018-05-02
Payer: COMMERCIAL

## 2018-05-02 VITALS
HEART RATE: 93 BPM | RESPIRATION RATE: 20 BRPM | SYSTOLIC BLOOD PRESSURE: 118 MMHG | OXYGEN SATURATION: 98 % | TEMPERATURE: 98.4 F | DIASTOLIC BLOOD PRESSURE: 68 MMHG

## 2018-05-02 PROCEDURE — G0152 HHCP-SERV OF OT,EA 15 MIN: HCPCS

## 2018-05-02 SDOH — ECONOMIC STABILITY: HOUSING INSECURITY: UNSAFE COOKING RANGE AREA: 0

## 2018-05-02 SDOH — ECONOMIC STABILITY: HOUSING INSECURITY: UNSAFE APPLIANCES: 0

## 2018-05-02 ASSESSMENT — ACTIVITIES OF DAILY LIVING (ADL)
DRESSING_LB_ASSISTANCE: 1
OASIS_M1830: 03
TOILETING_ASSISTANCE: 0
BATHING_ASSISTANCE: 5
TELEPHONE_ASSISTANCE: 0
ORAL_CARE_ASSISTANCE: 0
GROOMING_ASSISTANCE: 0
EATING_ASSISTANCE: 0
DRESSING_UB_ASSISTANCE: 0

## 2018-05-02 NOTE — TELEPHONE ENCOUNTER
Garcia, no. By [facility]'s new policy, I am no longer allowed to rx both ms contin and percocet. Anyone on ms contin must be referred to pain mgt. I cannot rx more than 5 percocet 10/325 a day, and with no other pain meds or sedatives

## 2018-05-02 NOTE — TELEPHONE ENCOUNTER
Phone Number Called: [phone] (home) [phone] (work)    Message: Pt informed. Pt understood. Pt requested [facility] number. Gave the number on the referral.    Left Message for patient to call back: no

## 2018-05-02 NOTE — TELEPHONE ENCOUNTER
Phone Number Called: [phone] (home) [phone] (work)    Message: Pt informed. Pt would rather Dr. Rubin be her pain management doctor. Pt stated that it gets to expensive seeing a specialist and she would also rather be seeing just one doctor who knows her and her problems as it is. Please advise.    Left Message for patient to call back: N\A

## 2018-05-03 ENCOUNTER — HOME CARE VISIT (OUTPATIENT)
Dept: HOME HEALTH SERVICES | Facility: HOME HEALTHCARE | Age: 53
End: 2018-05-03
Payer: COMMERCIAL

## 2018-05-03 ENCOUNTER — TELEPHONE (OUTPATIENT)
Dept: MEDICAL GROUP | Age: 53
End: 2018-05-03

## 2018-05-03 VITALS
DIASTOLIC BLOOD PRESSURE: 70 MMHG | TEMPERATURE: 98.9 F | OXYGEN SATURATION: 97 % | SYSTOLIC BLOOD PRESSURE: 110 MMHG | RESPIRATION RATE: 18 BRPM | HEART RATE: 96 BPM

## 2018-05-03 VITALS
HEART RATE: 95 BPM | BODY MASS INDEX: 39.36 KG/M2 | OXYGEN SATURATION: 98 % | WEIGHT: 222.2 LBS | DIASTOLIC BLOOD PRESSURE: 68 MMHG | TEMPERATURE: 99.1 F | RESPIRATION RATE: 18 BRPM | SYSTOLIC BLOOD PRESSURE: 108 MMHG

## 2018-05-03 VITALS
OXYGEN SATURATION: 99 % | HEART RATE: 95 BPM | DIASTOLIC BLOOD PRESSURE: 76 MMHG | RESPIRATION RATE: 18 BRPM | TEMPERATURE: 99.1 F | SYSTOLIC BLOOD PRESSURE: 110 MMHG

## 2018-05-03 VITALS
SYSTOLIC BLOOD PRESSURE: 106 MMHG | TEMPERATURE: 97.6 F | HEART RATE: 93 BPM | DIASTOLIC BLOOD PRESSURE: 66 MMHG | RESPIRATION RATE: 19 BRPM | OXYGEN SATURATION: 97 %

## 2018-05-03 DIAGNOSIS — M54.5 CHRONIC BILATERAL LOW BACK PAIN, WITH SCIATICA PRESENCE UNSPECIFIED: ICD-10-CM

## 2018-05-03 DIAGNOSIS — G89.29 CHRONIC BILATERAL LOW BACK PAIN, WITH SCIATICA PRESENCE UNSPECIFIED: ICD-10-CM

## 2018-05-03 PROCEDURE — G0152 HHCP-SERV OF OT,EA 15 MIN: HCPCS

## 2018-05-03 PROCEDURE — G0151 HHCP-SERV OF PT,EA 15 MIN: HCPCS

## 2018-05-03 PROCEDURE — G0495 RN CARE TRAIN/EDU IN HH: HCPCS

## 2018-05-03 RX ORDER — OXYCODONE AND ACETAMINOPHEN 10; 325 MG/1; MG/1
1-2 TABLET ORAL EVERY 8 HOURS PRN
Qty: 15 TAB | Status: CANCELLED | OUTPATIENT
Start: 2018-05-03

## 2018-05-03 RX ORDER — MORPHINE SULFATE 30 MG/1
30 TABLET, FILM COATED, EXTENDED RELEASE ORAL EVERY 12 HOURS
Qty: 60 TAB | Refills: 0 | Status: CANCELLED | OUTPATIENT
Start: 2018-05-03 | End: 2018-06-02

## 2018-05-03 NOTE — TELEPHONE ENCOUNTER
1. Caller Name: Karine Ovalle                                           Call Back Number: [phone] (home) [phone] (work)        Patient approves a detailed voicemail message: N\A    patient states that homecare nurse Zoya said that Dr Rubin has to write her RX for both oxycodone and morphine and is requesting RX

## 2018-05-04 SDOH — ECONOMIC STABILITY: HOUSING INSECURITY: UNSAFE COOKING RANGE AREA: 0

## 2018-05-04 SDOH — ECONOMIC STABILITY: HOUSING INSECURITY: UNSAFE APPLIANCES: 0

## 2018-05-04 ASSESSMENT — ENCOUNTER SYMPTOMS: RESPIRATORY SYMPTOMS COMMENTS: DENIES BREATHING PROBLEM

## 2018-05-04 NOTE — TELEPHONE ENCOUNTER
FYI    Pt was informed of referral for pain management on 05/02/2018 in previous telephone encounter.

## 2018-05-07 ENCOUNTER — HOME CARE VISIT (OUTPATIENT)
Dept: HOME HEALTH SERVICES | Facility: HOME HEALTHCARE | Age: 53
End: 2018-05-07
Payer: COMMERCIAL

## 2018-05-07 VITALS
RESPIRATION RATE: 18 BRPM | HEART RATE: 92 BPM | SYSTOLIC BLOOD PRESSURE: 110 MMHG | TEMPERATURE: 98.5 F | OXYGEN SATURATION: 98 % | DIASTOLIC BLOOD PRESSURE: 64 MMHG

## 2018-05-07 PROCEDURE — G0152 HHCP-SERV OF OT,EA 15 MIN: HCPCS

## 2018-05-08 ENCOUNTER — OFFICE VISIT (OUTPATIENT)
Dept: MEDICAL GROUP | Age: 53
End: 2018-05-08
Payer: COMMERCIAL

## 2018-05-08 ENCOUNTER — HOME CARE VISIT (OUTPATIENT)
Dept: HOME HEALTH SERVICES | Facility: HOME HEALTHCARE | Age: 53
End: 2018-05-08
Payer: COMMERCIAL

## 2018-05-08 VITALS
HEART RATE: 56 BPM | HEIGHT: 63 IN | SYSTOLIC BLOOD PRESSURE: 117 MMHG | WEIGHT: 222 LBS | DIASTOLIC BLOOD PRESSURE: 70 MMHG | BODY MASS INDEX: 39.34 KG/M2 | OXYGEN SATURATION: 91 % | TEMPERATURE: 98.8 F

## 2018-05-08 VITALS
WEIGHT: 219 LBS | OXYGEN SATURATION: 96 % | DIASTOLIC BLOOD PRESSURE: 60 MMHG | RESPIRATION RATE: 16 BRPM | HEART RATE: 94 BPM | TEMPERATURE: 98.4 F | SYSTOLIC BLOOD PRESSURE: 110 MMHG | BODY MASS INDEX: 38.79 KG/M2

## 2018-05-08 DIAGNOSIS — M16.11 PRIMARY OSTEOARTHRITIS OF RIGHT HIP: ICD-10-CM

## 2018-05-08 DIAGNOSIS — F11.20 UNCOMPLICATED OPIOID DEPENDENCE (HCC): ICD-10-CM

## 2018-05-08 DIAGNOSIS — R53.81 DEBILITY: ICD-10-CM

## 2018-05-08 DIAGNOSIS — E66.01 MORBID OBESITY WITH BMI OF 40.0-44.9, ADULT (HCC): ICD-10-CM

## 2018-05-08 DIAGNOSIS — R26.2 DISABILITY OF WALKING: ICD-10-CM

## 2018-05-08 DIAGNOSIS — M54.42 CHRONIC BILATERAL LOW BACK PAIN WITH BILATERAL SCIATICA: ICD-10-CM

## 2018-05-08 DIAGNOSIS — Z96.641 STATUS POST RIGHT HIP REPLACEMENT: ICD-10-CM

## 2018-05-08 DIAGNOSIS — G89.29 CHRONIC BILATERAL LOW BACK PAIN WITH BILATERAL SCIATICA: ICD-10-CM

## 2018-05-08 DIAGNOSIS — J45.20 MILD INTERMITTENT ASTHMA WITHOUT COMPLICATION: ICD-10-CM

## 2018-05-08 DIAGNOSIS — R60.0 EDEMA OF BOTH LEGS: ICD-10-CM

## 2018-05-08 DIAGNOSIS — I10 ESSENTIAL HYPERTENSION: ICD-10-CM

## 2018-05-08 DIAGNOSIS — M54.41 CHRONIC BILATERAL LOW BACK PAIN WITH BILATERAL SCIATICA: ICD-10-CM

## 2018-05-08 PROCEDURE — G0151 HHCP-SERV OF PT,EA 15 MIN: HCPCS

## 2018-05-08 PROCEDURE — 99215 OFFICE O/P EST HI 40 MIN: CPT | Performed by: INTERNAL MEDICINE

## 2018-05-08 RX ORDER — OXYCODONE AND ACETAMINOPHEN 10; 325 MG/1; MG/1
1 TABLET ORAL EVERY 6 HOURS PRN
Qty: 120 TAB | Refills: 0 | Status: SHIPPED | OUTPATIENT
Start: 2018-05-08 | End: 2018-06-07 | Stop reason: SDUPTHER

## 2018-05-08 RX ORDER — MELOXICAM 7.5 MG/1
TABLET ORAL
COMMUNITY
Start: 2018-04-29 | End: 2018-07-16

## 2018-05-08 RX ORDER — POTASSIUM CHLORIDE 750 MG/1
20 TABLET, FILM COATED, EXTENDED RELEASE ORAL DAILY
Qty: 180 TAB | Refills: 4 | Status: SHIPPED | OUTPATIENT
Start: 2018-05-08 | End: 2018-06-07 | Stop reason: SDUPTHER

## 2018-05-08 RX ORDER — MORPHINE SULFATE 30 MG/1
30 TABLET, FILM COATED, EXTENDED RELEASE ORAL EVERY 12 HOURS
Qty: 60 TAB | Refills: 0 | Status: SHIPPED | OUTPATIENT
Start: 2018-05-08 | End: 2018-06-07 | Stop reason: SDUPTHER

## 2018-05-08 ASSESSMENT — ENCOUNTER SYMPTOMS
CONSTITUTIONAL NEGATIVE: 1
RESPIRATORY NEGATIVE: 1
NEUROLOGICAL NEGATIVE: 1
PSYCHIATRIC NEGATIVE: 1
GASTROINTESTINAL NEGATIVE: 1
DEBILITATING PAIN: 1
CARDIOVASCULAR NEGATIVE: 1
EYES NEGATIVE: 1
MUSCULOSKELETAL NEGATIVE: 1

## 2018-05-09 ENCOUNTER — HOME CARE VISIT (OUTPATIENT)
Dept: HOME HEALTH SERVICES | Facility: HOME HEALTHCARE | Age: 53
End: 2018-05-09
Payer: COMMERCIAL

## 2018-05-09 VITALS
RESPIRATION RATE: 18 BRPM | SYSTOLIC BLOOD PRESSURE: 106 MMHG | HEART RATE: 97 BPM | OXYGEN SATURATION: 95 % | DIASTOLIC BLOOD PRESSURE: 56 MMHG | TEMPERATURE: 97.8 F

## 2018-05-09 PROCEDURE — G0152 HHCP-SERV OF OT,EA 15 MIN: HCPCS

## 2018-05-09 NOTE — PROGRESS NOTES
Subjective:      Karine Ovalle is a 52 y.o. female who presents with Follow-Up  The patient is here for followup of chronic medical problems listed below. The patient is compliant with medications and having no side effects from them. Denies chest pain, abdominal pain, dyspnea, myalgias, or cough.   Patient Active Problem List    Diagnosis Date Noted   • Mild intermittent asthma without complication 04/25/2012     Priority: High   • DDD (degenerative disc disease), lumbar 04/25/2018   • Morbid obesity with BMI of 40.0-44.9, adult ([facility]) 04/25/2018   • Status post right hip replacement 03/20/2018   • Disability of walking- due to severe bilateral hip DJD- needs bilat THR- placed on full disability 2/5/18-2/5/19 02/05/2018   • Uncomplicated opioid dependence (CMS-[facility])-  01/31/2018   • Essential hypertension 01/31/2018   • Primary osteoarthritis of right hip- severe; THR done 3/20/18- DR JERRY 01/22/2018   • Lymphedema 10/17/2017   • Debility- FMLA through april 2018 10/12/2017   • Venous insufficiency 10/03/2017   • Colon cancer screening- needs 08/09/2017   • Chronic bilateral low back pain with bilateral sciatica- pain mgt 07/26/2017   • Vitamin B 12 deficiency 06/02/2016   • Fibroids 08/13/2013        Outpatient Medications Prior to Visit   Medication Sig Dispense Refill   • furosemide (LASIX) 40 MG Tab Take 40 mg by mouth every day.     • oxyCODONE-acetaminophen (PERCOCET-10)  MG Tab Take 1-2 Tabs by mouth every 8 hours as needed for Severe Pain.     • morphine ER (MS CONTIN) 30 MG Tab CR tablet Take 30 mg by mouth every 12 hours.     • omeprazole (PRILOSEC) 20 MG delayed-release capsule Take 1 Cap by mouth every day. 30 Cap 0   • potassium chloride ER (KLOR-CON) 10 MEQ tablet Take 1 Tab by mouth every day. (Patient taking differently: Take 20 mEq by mouth every day.) 90 Tab 4   • cyanocobalamin (VITAMIN B12) 1000 MCG Tab Take 1 Tab by mouth every day. 90 Tab 4   • multivitamin (THERAGRAN) Tab Take 1  Tab by mouth every day.     • vitamin D (CHOLECALCIFEROL) 1000 UNIT Tab Take 1,000 Units by mouth every day.       No facility-administered medications prior to visit.      Allergies   Allergen Reactions   • Zofran [Dextrose-Ondansetron] Nausea     The patient is scheduled to see pain management initial visit in 1 month.  She needs a refill of her narcotic medications to last her until then.  She also requires disability forms filled out until she can return to work with no restrictions.    She is now about 2 months status post right total hip replacement and is undergoing physical therapy 2-3 times a week.  She is progressing nicely and is graduated from wheelchair to rolling walker.  The pain right hip has diminished considerably and her strength is gradually returning.  However she still has chronic low back pain from her DDD.  She also has pain in the left hip which she can tolerate but will eventually require total hip replacement as well.  She plans to put this off for the next several months.    She wants to try to return to work as soon as possible, but is limited by the chronic pain and reduced mobility.          Outpatient Medications Prior to Visit   Medication Sig Dispense Refill   • furosemide (LASIX) 40 MG Tab Take 40 mg by mouth every day.     • oxyCODONE-acetaminophen (PERCOCET-10)  MG Tab Take 1-2 Tabs by mouth every 8 hours as needed for Severe Pain.     • morphine ER (MS CONTIN) 30 MG Tab CR tablet Take 30 mg by mouth every 12 hours.     • omeprazole (PRILOSEC) 20 MG delayed-release capsule Take 1 Cap by mouth every day. 30 Cap 0   • potassium chloride ER (KLOR-CON) 10 MEQ tablet Take 1 Tab by mouth every day. (Patient taking differently: Take 20 mEq by mouth every day.) 90 Tab 4   • cyanocobalamin (VITAMIN B12) 1000 MCG Tab Take 1 Tab by mouth every day. 90 Tab 4   • multivitamin (THERAGRAN) Tab Take 1 Tab by mouth every day.     • vitamin D (CHOLECALCIFEROL) 1000 UNIT Tab Take 1,000 Units  "by mouth every day.       No facility-administered medications prior to visit.         Allergies   Allergen Reactions   • Zofran [Dextrose-Ondansetron] Nausea       And  The patient is here for followup of chronic medical problems listed below. The patient is compliant with medications and having no side effects from them. Denies chest pain, abdominal pain, dyspnea, myalgias, or cough.   Patient Active Problem List    Diagnosis Date Noted   • Mild intermittent asthma without complication 04/25/2012     Priority: High   • DDD (degenerative disc disease), lumbar 04/25/2018   • Morbid obesity with BMI of 40.0-44.9, adult ([facility]) 04/25/2018   • Status post right hip replacement 03/20/2018   • Disability of walking- due to severe bilateral hip DJD- needs bilat THR- placed on full disability 2/5/18-2/5/19 02/05/2018   • Uncomplicated opioid dependence (CMS-[facility])-  01/31/2018   • Essential hypertension 01/31/2018   • Primary osteoarthritis of right hip- severe; THR done 3/20/18- DR JERRY 01/22/2018   • Lymphedema 10/17/2017   • Debility- FMLA through april 2018 10/12/2017   • Venous insufficiency 10/03/2017   • Colon cancer screening- needs 08/09/2017   • Chronic bilateral low back pain with bilateral sciatica- pain mgt 07/26/2017   • Vitamin B 12 deficiency 06/02/2016   • Fibroids 08/13/2013          HPI    Review of Systems   Constitutional: Negative.    HENT: Negative.    Eyes: Negative.    Respiratory: Negative.    Cardiovascular: Negative.    Gastrointestinal: Negative.    Genitourinary: Negative.    Musculoskeletal: Negative.    Skin: Negative.    Neurological: Negative.    Endo/Heme/Allergies: Negative.    Psychiatric/Behavioral: Negative.           Objective:     /70   Pulse (!) 56   Temp 37.1 °C (98.8 °F)   Ht 1.6 m (5' 2.99\")   Wt 100.7 kg (222 lb)   LMP 05/17/2017   SpO2 91%   BMI 39.34 kg/m²      Physical Exam   Constitutional: She is oriented to person, place, and time. She appears well-developed " and well-nourished. No distress.   HENT:   Head: Normocephalic and atraumatic.   Right Ear: External ear normal.   Left Ear: External ear normal.   Nose: Nose normal.   Mouth/Throat: Oropharynx is clear and moist. No oropharyngeal exudate.   Eyes: Conjunctivae and EOM are normal. Pupils are equal, round, and reactive to light. Right eye exhibits no discharge. Left eye exhibits no discharge. No scleral icterus.   Neck: Normal range of motion. Neck supple. No JVD present. No tracheal deviation present. No thyromegaly present.   Cardiovascular: Normal rate, regular rhythm, normal heart sounds and intact distal pulses.  Exam reveals no gallop and no friction rub.    No murmur heard.  Pulmonary/Chest: Effort normal and breath sounds normal. No stridor. No respiratory distress. She has no wheezes. She has no rales. She exhibits no tenderness.   Abdominal: Soft. Bowel sounds are normal. She exhibits no distension and no mass. There is no tenderness. There is no rebound and no guarding.   Musculoskeletal: Normal range of motion. She exhibits no edema or tenderness.   Lymphadenopathy:     She has no cervical adenopathy.   Neurological: She is alert and oriented to person, place, and time. She has normal reflexes. She displays normal reflexes. No cranial nerve deficit. She exhibits normal muscle tone. Coordination normal.   Skin: Skin is warm and dry. No rash noted. She is not diaphoretic. No erythema. No pallor.   Psychiatric: She has a normal mood and affect. Her behavior is normal. Judgment and thought content normal.   Vitals reviewed.    No visits with results within 1 Month(s) from this visit.   Latest known visit with results is:   Admission on 03/20/2018, Discharged on 03/23/2018   Component Date Value   • Hemoglobin 03/21/2018 8.0*   • Hematocrit 03/21/2018 23.5*   • Hemoglobin 03/22/2018 7.5*   • Hematocrit 03/22/2018 22.0*   • Hemoglobin 03/23/2018 7.5*   • Hematocrit 03/23/2018 22.0*      Lab Results   Component  Value Date/Time    HBA1C 5.3 06/12/2017 09:03 AM     Lab Results   Component Value Date/Time    SODIUM 134 (L) 03/07/2018 05:50 PM    POTASSIUM 3.8 03/07/2018 05:50 PM    CHLORIDE 101 03/07/2018 05:50 PM    CO2 24 03/07/2018 05:50 PM    GLUCOSE 79 03/07/2018 05:50 PM    BUN 11 03/07/2018 05:50 PM    CREATININE 0.71 03/07/2018 05:50 PM    CREATININE 0.88 08/14/2010 12:00 AM    BUNCREATRAT 13 08/14/2010 12:00 AM    GLOMRATE >59 08/14/2010 12:00 AM    ALKPHOSPHAT 90 02/22/2018 05:05 PM    ASTSGOT 16 02/22/2018 05:05 PM    ALTSGPT 8 02/22/2018 05:05 PM    TBILIRUBIN 0.4 02/22/2018 05:05 PM     Lab Results   Component Value Date/Time    INR 1.04 05/18/2012 12:20 PM     Lab Results   Component Value Date/Time    CHOLSTRLTOT 168 02/22/2018 05:05 PM    LDL 84 02/22/2018 05:05 PM    HDL 67 02/22/2018 05:05 PM    TRIGLYCERIDE 85 02/22/2018 05:05 PM       No results found for: TESTOSTERONE  Lab Results   Component Value Date/Time    TSH 2.110 08/14/2010 12:00 AM     Lab Results   Component Value Date/Time    FREET4 0.72 05/03/2016 07:32 AM     Lab Results   Component Value Date/Time    URICACID 5.6 08/12/2014 04:49 PM     No components found for: VITB12  Lab Results   Component Value Date/Time    25HYDROXY 33 08/12/2014 04:49 PM    25HYDROXY 25 (L) 07/03/2012 09:20 AM               Assessment/Plan:     1. Debility- FMLA through april 2018   Under good control. Continue same regimen.  '    2. Disability of walking- due to severe bilateral hip DJD- needs bilat THR- placed on full disability 2/5/18-2/5/19    Under good control. Continue same regimen.'  3. Uncomplicated opioid dependence (CMS-HCC)-   Patient will be transferring to pain management to continue current regimen.               The patient I am unable to prescribe more than 40 mg of Percocet  a day, and if she requires MS Contin as she currently does she will need to continue management through pain management.  I explained I will provide her with 1 month supply to  last her until she can get in 1 months from now.      - morphine ER (MS CONTIN) 30 MG Tab CR tablet; Take 1 Tab by mouth every 12 hours for 30 days.  Dispense: 60 Tab; Refill: 0  - CONSENT FOR OPIATE PRESCRIPTION  - oxyCODONE-acetaminophen (PERCOCET-10)  MG Tab; Take 1 Tab by mouth every 6 hours as needed for Severe Pain for up to 30 days.  Dispense: 120 Tab; Refill: 0    4. Primary osteoarthritis of right hip- severe; THR done 3/20/18- DR JERRY   Under good control. Continue same regimen.    - morphine ER (MS CONTIN) 30 MG Tab CR tablet; Take 1 Tab by mouth every 12 hours for 30 days.  Dispense: 60 Tab; Refill: 0  - CONSENT FOR OPIATE PRESCRIPTION  - oxyCODONE-acetaminophen (PERCOCET-10)  MG Tab; Take 1 Tab by mouth every 6 hours as needed for Severe Pain for up to 30 days.  Dispense: 120 Tab; Refill: 0    5. Chronic bilateral low back pain with bilateral sciatica- pain mgt   Under good control. Continue same regimen.]  - CONSENT FOR OPIATE PRESCRIPTION  - oxyCODONE-acetaminophen (PERCOCET-10)  MG Tab; Take 1 Tab by mouth every 6 hours as needed for Severe Pain for up to 30 days.  Dispense: 120 Tab; Refill: 0    6. Essential hypertension   Under good control. Continue same regimen.      7. Edema of both legs Under good control. Continue same regimen.     - potassium chloride ER (KLOR-CON) 10 MEQ tablet; Take 2 Tabs by mouth every day.  Dispense: 180 Tab; Refill: 4    8. Morbid obesity with BMI of 40.0-44.9, adult (HCC)    Under good control. Continue same regimen.  9. Status post right hip replacement      Under good control. Continue same regimen.  10. Mild intermittent asthma without complication     Under good control. Continue same regimen.    11. Primary osteoarthritis of right hip-  Under good control. Continue same regimen.          - morphine ER (MS CONTIN) 30 MG Tab CR tablet; Take 1 Tab by mouth every 12 hours for 30 days.  Dispense: 60 Tab; Refill: 0  - CONSENT FOR OPIATE  PRESCRIPTION  - oxyCODONE-acetaminophen (PERCOCET-10)  MG Tab; Take 1 Tab by mouth every 6 hours as needed for Severe Pain for up to 30 days.  Dispense: 120 Tab; Refill: 0    12. Uncomplicated opioid dependence (CMS-HCC)- pcp dr hackett to rx    Under good control. Continue same regimen.'  - morphine ER (MS CONTIN) 30 MG Tab CR tablet; Take 1 Tab by mouth every 12 hours for 30 days.  Dispense: 60 Tab; Refill: 0  - CONSENT FOR OPIATE PRESCRIPTION  - oxyCODONE-acetaminophen (PERCOCET-10)  MG Tab; Take 1 Tab by mouth every 6 hours as needed for Severe Pain for up to 30 days.  Dispense: 120 Tab; Refill: 0      40 minute face-to-face encounter took place today.  More than half of this time was spent in the coordination of care of the above problems, as well as counseling.

## 2018-05-10 ENCOUNTER — HOME CARE VISIT (OUTPATIENT)
Dept: HOME HEALTH SERVICES | Facility: HOME HEALTHCARE | Age: 53
End: 2018-05-10
Payer: COMMERCIAL

## 2018-05-10 PROCEDURE — G0152 HHCP-SERV OF OT,EA 15 MIN: HCPCS

## 2018-05-10 PROCEDURE — G0495 RN CARE TRAIN/EDU IN HH: HCPCS

## 2018-05-11 ENCOUNTER — HOME CARE VISIT (OUTPATIENT)
Dept: HOME HEALTH SERVICES | Facility: HOME HEALTHCARE | Age: 53
End: 2018-05-11
Payer: COMMERCIAL

## 2018-05-11 VITALS
SYSTOLIC BLOOD PRESSURE: 104 MMHG | BODY MASS INDEX: 38.45 KG/M2 | RESPIRATION RATE: 18 BRPM | HEART RATE: 94 BPM | DIASTOLIC BLOOD PRESSURE: 60 MMHG | WEIGHT: 217 LBS | TEMPERATURE: 97.5 F | OXYGEN SATURATION: 95 %

## 2018-05-11 VITALS
HEART RATE: 94 BPM | TEMPERATURE: 98.1 F | SYSTOLIC BLOOD PRESSURE: 108 MMHG | RESPIRATION RATE: 18 BRPM | OXYGEN SATURATION: 98 % | DIASTOLIC BLOOD PRESSURE: 84 MMHG

## 2018-05-11 PROCEDURE — G0151 HHCP-SERV OF PT,EA 15 MIN: HCPCS

## 2018-05-11 PROCEDURE — G0180 MD CERTIFICATION HHA PATIENT: HCPCS | Performed by: INTERNAL MEDICINE

## 2018-05-11 ASSESSMENT — ENCOUNTER SYMPTOMS: DEBILITATING PAIN: 1

## 2018-05-12 VITALS
WEIGHT: 222 LBS | BODY MASS INDEX: 39.34 KG/M2 | OXYGEN SATURATION: 98 % | RESPIRATION RATE: 18 BRPM | DIASTOLIC BLOOD PRESSURE: 84 MMHG | HEART RATE: 94 BPM | SYSTOLIC BLOOD PRESSURE: 108 MMHG | TEMPERATURE: 98.1 F

## 2018-05-12 SDOH — ECONOMIC STABILITY: HOUSING INSECURITY: UNSAFE APPLIANCES: 0

## 2018-05-12 SDOH — ECONOMIC STABILITY: HOUSING INSECURITY: UNSAFE COOKING RANGE AREA: 0

## 2018-05-12 ASSESSMENT — ENCOUNTER SYMPTOMS: RESPIRATORY SYMPTOMS COMMENTS: DENIES BREATHING PROBLEM

## 2018-05-13 ENCOUNTER — HOME CARE VISIT (OUTPATIENT)
Dept: HOME HEALTH SERVICES | Facility: HOME HEALTHCARE | Age: 53
End: 2018-05-13
Payer: COMMERCIAL

## 2018-05-13 PROCEDURE — G0152 HHCP-SERV OF OT,EA 15 MIN: HCPCS

## 2018-05-14 ENCOUNTER — HOME CARE VISIT (OUTPATIENT)
Dept: HOME HEALTH SERVICES | Facility: HOME HEALTHCARE | Age: 53
End: 2018-05-14
Payer: COMMERCIAL

## 2018-05-14 VITALS
OXYGEN SATURATION: 98 % | DIASTOLIC BLOOD PRESSURE: 68 MMHG | RESPIRATION RATE: 18 BRPM | HEART RATE: 98 BPM | TEMPERATURE: 98 F | SYSTOLIC BLOOD PRESSURE: 116 MMHG

## 2018-05-14 VITALS
DIASTOLIC BLOOD PRESSURE: 78 MMHG | RESPIRATION RATE: 18 BRPM | TEMPERATURE: 98.2 F | WEIGHT: 218 LBS | SYSTOLIC BLOOD PRESSURE: 118 MMHG | BODY MASS INDEX: 38.63 KG/M2 | OXYGEN SATURATION: 98 % | HEART RATE: 95 BPM

## 2018-05-14 PROCEDURE — G0152 HHCP-SERV OF OT,EA 15 MIN: HCPCS

## 2018-05-14 PROCEDURE — G0162 HHC RN E&M PLAN SVS, 15 MIN: HCPCS

## 2018-05-14 ASSESSMENT — ENCOUNTER SYMPTOMS
VOMITING: DENIES
RESPIRATORY SYMPTOMS COMMENTS: NO CONCERNS AT THE TIME OF THIS ASSESSMENT.
NAUSEA: DENIES

## 2018-05-15 ENCOUNTER — HOME CARE VISIT (OUTPATIENT)
Dept: HOME HEALTH SERVICES | Facility: HOME HEALTHCARE | Age: 53
End: 2018-05-15
Payer: COMMERCIAL

## 2018-05-15 ENCOUNTER — TELEPHONE (OUTPATIENT)
Dept: VASCULAR LAB | Facility: MEDICAL CENTER | Age: 53
End: 2018-05-15

## 2018-05-15 VITALS
SYSTOLIC BLOOD PRESSURE: 106 MMHG | DIASTOLIC BLOOD PRESSURE: 62 MMHG | RESPIRATION RATE: 18 BRPM | BODY MASS INDEX: 38.8 KG/M2 | OXYGEN SATURATION: 96 % | WEIGHT: 219 LBS | TEMPERATURE: 97.2 F | HEART RATE: 96 BPM

## 2018-05-15 VITALS
TEMPERATURE: 98.2 F | SYSTOLIC BLOOD PRESSURE: 118 MMHG | HEART RATE: 95 BPM | RESPIRATION RATE: 18 BRPM | OXYGEN SATURATION: 98 % | DIASTOLIC BLOOD PRESSURE: 78 MMHG

## 2018-05-15 PROCEDURE — G0151 HHCP-SERV OF PT,EA 15 MIN: HCPCS

## 2018-05-15 NOTE — TELEPHONE ENCOUNTER
LM on VM that in order to get her a lymphedema pump she will need 30 days of conservative therapy first for her insurance to cover the pump. I suggested that she start with wound care then call me to let me know when she has had 30 days of treatment then I will order the pump for her. Confirmed by Georgina in wound care [phone] [address].

## 2018-05-16 ENCOUNTER — HOSPITAL ENCOUNTER (OUTPATIENT)
Facility: MEDICAL CENTER | Age: 53
End: 2018-05-16
Attending: INTERNAL MEDICINE
Payer: COMMERCIAL

## 2018-05-16 ENCOUNTER — HOME CARE VISIT (OUTPATIENT)
Dept: HOME HEALTH SERVICES | Facility: HOME HEALTHCARE | Age: 53
End: 2018-05-16
Payer: COMMERCIAL

## 2018-05-16 VITALS
OXYGEN SATURATION: 96 % | SYSTOLIC BLOOD PRESSURE: 108 MMHG | HEART RATE: 98 BPM | BODY MASS INDEX: 38.8 KG/M2 | WEIGHT: 219 LBS | HEIGHT: 63 IN | DIASTOLIC BLOOD PRESSURE: 62 MMHG | RESPIRATION RATE: 18 BRPM | TEMPERATURE: 98.9 F

## 2018-05-16 LAB
ANION GAP SERPL CALC-SCNC: 7 MMOL/L (ref 0–11.9)
BUN SERPL-MCNC: 15 MG/DL (ref 8–22)
CALCIUM SERPL-MCNC: 8.7 MG/DL (ref 8.5–10.5)
CHLORIDE SERPL-SCNC: 107 MMOL/L (ref 96–112)
CO2 SERPL-SCNC: 23 MMOL/L (ref 20–33)
CREAT SERPL-MCNC: 0.79 MG/DL (ref 0.5–1.4)
GLUCOSE SERPL-MCNC: 75 MG/DL (ref 65–99)
POTASSIUM SERPL-SCNC: 4.4 MMOL/L (ref 3.6–5.5)
SODIUM SERPL-SCNC: 137 MMOL/L (ref 135–145)

## 2018-05-16 PROCEDURE — 80048 BASIC METABOLIC PNL TOTAL CA: CPT

## 2018-05-16 PROCEDURE — 6650022 HCR  ALCOHOL PREP PAD - EA

## 2018-05-16 PROCEDURE — G0299 HHS/HOSPICE OF RN EA 15 MIN: HCPCS

## 2018-05-16 PROCEDURE — G0152 HHCP-SERV OF OT,EA 15 MIN: HCPCS

## 2018-05-16 PROCEDURE — A4215 STERILE NEEDLE: HCPCS

## 2018-05-16 SDOH — ECONOMIC STABILITY: HOUSING INSECURITY: UNSAFE COOKING RANGE AREA: 0

## 2018-05-16 SDOH — ECONOMIC STABILITY: HOUSING INSECURITY: UNSAFE APPLIANCES: 0

## 2018-05-16 ASSESSMENT — ENCOUNTER SYMPTOMS
VOMITING: DENIES
NAUSEA: DENIES
DEBILITATING PAIN: 1

## 2018-05-17 VITALS
TEMPERATURE: 98.9 F | DIASTOLIC BLOOD PRESSURE: 62 MMHG | SYSTOLIC BLOOD PRESSURE: 108 MMHG | HEART RATE: 98 BPM | OXYGEN SATURATION: 96 % | RESPIRATION RATE: 18 BRPM

## 2018-05-18 ENCOUNTER — HOME CARE VISIT (OUTPATIENT)
Dept: HOME HEALTH SERVICES | Facility: HOME HEALTHCARE | Age: 53
End: 2018-05-18
Payer: COMMERCIAL

## 2018-05-18 VITALS
BODY MASS INDEX: 39.86 KG/M2 | RESPIRATION RATE: 18 BRPM | TEMPERATURE: 99.2 F | HEART RATE: 89 BPM | DIASTOLIC BLOOD PRESSURE: 80 MMHG | WEIGHT: 225 LBS | SYSTOLIC BLOOD PRESSURE: 132 MMHG | OXYGEN SATURATION: 98 %

## 2018-05-18 PROCEDURE — G0151 HHCP-SERV OF PT,EA 15 MIN: HCPCS

## 2018-05-18 ASSESSMENT — ENCOUNTER SYMPTOMS: DEBILITATING PAIN: 1

## 2018-05-21 ENCOUNTER — HOME CARE VISIT (OUTPATIENT)
Dept: HOME HEALTH SERVICES | Facility: HOME HEALTHCARE | Age: 53
End: 2018-05-21
Payer: COMMERCIAL

## 2018-05-21 PROCEDURE — G0152 HHCP-SERV OF OT,EA 15 MIN: HCPCS

## 2018-05-22 ENCOUNTER — HOME CARE VISIT (OUTPATIENT)
Dept: HOME HEALTH SERVICES | Facility: HOME HEALTHCARE | Age: 53
End: 2018-05-22
Payer: COMMERCIAL

## 2018-05-22 VITALS
BODY MASS INDEX: 39.5 KG/M2 | SYSTOLIC BLOOD PRESSURE: 118 MMHG | DIASTOLIC BLOOD PRESSURE: 70 MMHG | RESPIRATION RATE: 18 BRPM | HEART RATE: 89 BPM | WEIGHT: 223 LBS | OXYGEN SATURATION: 96 % | TEMPERATURE: 99 F

## 2018-05-22 VITALS
RESPIRATION RATE: 18 BRPM | DIASTOLIC BLOOD PRESSURE: 72 MMHG | HEART RATE: 93 BPM | TEMPERATURE: 98.7 F | SYSTOLIC BLOOD PRESSURE: 130 MMHG | OXYGEN SATURATION: 97 %

## 2018-05-22 PROCEDURE — G0151 HHCP-SERV OF PT,EA 15 MIN: HCPCS

## 2018-05-23 ENCOUNTER — HOME CARE VISIT (OUTPATIENT)
Dept: HOME HEALTH SERVICES | Facility: HOME HEALTHCARE | Age: 53
End: 2018-05-23
Payer: COMMERCIAL

## 2018-05-23 PROCEDURE — G0152 HHCP-SERV OF OT,EA 15 MIN: HCPCS

## 2018-05-24 ENCOUNTER — HOME CARE VISIT (OUTPATIENT)
Dept: HOME HEALTH SERVICES | Facility: HOME HEALTHCARE | Age: 53
End: 2018-05-24
Payer: COMMERCIAL

## 2018-05-24 VITALS
RESPIRATION RATE: 16 BRPM | SYSTOLIC BLOOD PRESSURE: 118 MMHG | WEIGHT: 225 LBS | BODY MASS INDEX: 39.86 KG/M2 | TEMPERATURE: 98.4 F | OXYGEN SATURATION: 96 % | HEART RATE: 93 BPM | DIASTOLIC BLOOD PRESSURE: 76 MMHG

## 2018-05-24 VITALS
SYSTOLIC BLOOD PRESSURE: 118 MMHG | TEMPERATURE: 98.4 F | RESPIRATION RATE: 18 BRPM | BODY MASS INDEX: 39.93 KG/M2 | DIASTOLIC BLOOD PRESSURE: 76 MMHG | OXYGEN SATURATION: 96 % | HEART RATE: 93 BPM | WEIGHT: 225.4 LBS

## 2018-05-24 VITALS
SYSTOLIC BLOOD PRESSURE: 110 MMHG | OXYGEN SATURATION: 96 % | TEMPERATURE: 99.1 F | DIASTOLIC BLOOD PRESSURE: 68 MMHG | RESPIRATION RATE: 18 BRPM | HEART RATE: 99 BPM

## 2018-05-24 PROCEDURE — G0151 HHCP-SERV OF PT,EA 15 MIN: HCPCS

## 2018-05-24 PROCEDURE — G0152 HHCP-SERV OF OT,EA 15 MIN: HCPCS

## 2018-05-24 PROCEDURE — G0162 HHC RN E&M PLAN SVS, 15 MIN: HCPCS

## 2018-05-24 ASSESSMENT — ENCOUNTER SYMPTOMS
RESPIRATORY SYMPTOMS COMMENTS: NO CONCERNS AT THE TIME OF THIS ASSESSMENT.
NAUSEA: DENIES
VOMITING: DENIES

## 2018-05-25 VITALS
SYSTOLIC BLOOD PRESSURE: 118 MMHG | TEMPERATURE: 98.4 F | OXYGEN SATURATION: 96 % | RESPIRATION RATE: 18 BRPM | DIASTOLIC BLOOD PRESSURE: 76 MMHG | HEART RATE: 93 BPM

## 2018-05-28 ENCOUNTER — HOME CARE VISIT (OUTPATIENT)
Dept: HOME HEALTH SERVICES | Facility: HOME HEALTHCARE | Age: 53
End: 2018-05-28
Payer: COMMERCIAL

## 2018-05-28 SDOH — ECONOMIC STABILITY: HOUSING INSECURITY: UNSAFE APPLIANCES: 0

## 2018-05-28 SDOH — ECONOMIC STABILITY: HOUSING INSECURITY: UNSAFE COOKING RANGE AREA: 0

## 2018-05-28 SDOH — ECONOMIC STABILITY: HOUSING INSECURITY
HOME SAFETY: PATIENT LIVES IN A FIRST FLOOR APARTMENT WITH HER THREE (3) SMALL DOGS. PATIENT IS NOT ON SUPPLEMENTAL OXYGEN.

## 2018-05-28 ASSESSMENT — ACTIVITIES OF DAILY LIVING (ADL)
OASIS_M1830: 01
HOME_HEALTH_OASIS: 01

## 2018-06-04 ENCOUNTER — APPOINTMENT (OUTPATIENT)
Dept: PHYSICAL THERAPY | Facility: REHABILITATION | Age: 53
End: 2018-06-04
Attending: PHYSICIAN ASSISTANT
Payer: COMMERCIAL

## 2018-06-07 ENCOUNTER — OFFICE VISIT (OUTPATIENT)
Dept: MEDICAL GROUP | Age: 53
End: 2018-06-07
Payer: COMMERCIAL

## 2018-06-07 VITALS
OXYGEN SATURATION: 91 % | SYSTOLIC BLOOD PRESSURE: 128 MMHG | WEIGHT: 210 LBS | HEART RATE: 111 BPM | BODY MASS INDEX: 37.21 KG/M2 | TEMPERATURE: 99.5 F | HEIGHT: 63 IN | DIASTOLIC BLOOD PRESSURE: 76 MMHG

## 2018-06-07 DIAGNOSIS — E66.9 OBESITY (BMI 30-39.9): ICD-10-CM

## 2018-06-07 DIAGNOSIS — R53.81 DEBILITY: ICD-10-CM

## 2018-06-07 DIAGNOSIS — M54.41 CHRONIC BILATERAL LOW BACK PAIN WITH BILATERAL SCIATICA: ICD-10-CM

## 2018-06-07 DIAGNOSIS — I89.0 LYMPHEDEMA: ICD-10-CM

## 2018-06-07 DIAGNOSIS — R60.0 EDEMA OF BOTH LEGS: ICD-10-CM

## 2018-06-07 DIAGNOSIS — M16.11 PRIMARY OSTEOARTHRITIS OF RIGHT HIP: ICD-10-CM

## 2018-06-07 DIAGNOSIS — I87.2 VENOUS INSUFFICIENCY: ICD-10-CM

## 2018-06-07 DIAGNOSIS — Z12.11 COLON CANCER SCREENING: ICD-10-CM

## 2018-06-07 DIAGNOSIS — E87.6 HYPOKALEMIA: ICD-10-CM

## 2018-06-07 DIAGNOSIS — F11.20 UNCOMPLICATED OPIOID DEPENDENCE (HCC): ICD-10-CM

## 2018-06-07 DIAGNOSIS — M51.36 DDD (DEGENERATIVE DISC DISEASE), LUMBAR: ICD-10-CM

## 2018-06-07 DIAGNOSIS — M54.42 CHRONIC BILATERAL LOW BACK PAIN WITH BILATERAL SCIATICA: ICD-10-CM

## 2018-06-07 DIAGNOSIS — Z96.641 STATUS POST RIGHT HIP REPLACEMENT: ICD-10-CM

## 2018-06-07 DIAGNOSIS — I10 ESSENTIAL HYPERTENSION: ICD-10-CM

## 2018-06-07 DIAGNOSIS — J45.20 MILD INTERMITTENT ASTHMA WITHOUT COMPLICATION: ICD-10-CM

## 2018-06-07 DIAGNOSIS — R26.2 DISABILITY OF WALKING: ICD-10-CM

## 2018-06-07 DIAGNOSIS — G89.29 CHRONIC BILATERAL LOW BACK PAIN WITH BILATERAL SCIATICA: ICD-10-CM

## 2018-06-07 DIAGNOSIS — E53.8 VITAMIN B 12 DEFICIENCY: ICD-10-CM

## 2018-06-07 DIAGNOSIS — M16.12 PRIMARY OSTEOARTHRITIS OF LEFT HIP: ICD-10-CM

## 2018-06-07 PROBLEM — E66.01 MORBID OBESITY WITH BMI OF 40.0-44.9, ADULT (HCC): Status: RESOLVED | Noted: 2018-04-25 | Resolved: 2018-06-07

## 2018-06-07 PROCEDURE — 99215 OFFICE O/P EST HI 40 MIN: CPT | Performed by: INTERNAL MEDICINE

## 2018-06-07 RX ORDER — POTASSIUM CHLORIDE 750 MG/1
20 TABLET, FILM COATED, EXTENDED RELEASE ORAL DAILY
Qty: 180 TAB | Refills: 4 | Status: SHIPPED | OUTPATIENT
Start: 2018-06-07 | End: 2018-06-07

## 2018-06-07 RX ORDER — POTASSIUM CHLORIDE 20 MEQ/1
20 TABLET, EXTENDED RELEASE ORAL DAILY
Qty: 90 TAB | Refills: 4 | Status: ON HOLD | OUTPATIENT
Start: 2018-06-07 | End: 2019-02-28

## 2018-06-07 RX ORDER — FUROSEMIDE 40 MG/1
40 TABLET ORAL DAILY
Qty: 30 TAB | Refills: 0 | Status: SHIPPED | OUTPATIENT
Start: 2018-06-07 | End: 2018-06-07 | Stop reason: SDUPTHER

## 2018-06-07 RX ORDER — OXYCODONE AND ACETAMINOPHEN 10; 325 MG/1; MG/1
1 TABLET ORAL EVERY 6 HOURS PRN
Qty: 120 TAB | Refills: 0 | Status: SHIPPED | OUTPATIENT
Start: 2018-06-07 | End: 2018-07-09 | Stop reason: SDUPTHER

## 2018-06-07 RX ORDER — MORPHINE SULFATE 30 MG/1
30 TABLET, FILM COATED, EXTENDED RELEASE ORAL EVERY 12 HOURS
Qty: 60 TAB | Refills: 0 | Status: SHIPPED | OUTPATIENT
Start: 2018-06-07 | End: 2018-06-20 | Stop reason: SDUPTHER

## 2018-06-07 RX ORDER — FUROSEMIDE 40 MG/1
40 TABLET ORAL DAILY
Qty: 90 TAB | Refills: 3 | Status: SHIPPED | OUTPATIENT
Start: 2018-06-07 | End: 2018-10-25 | Stop reason: SDUPTHER

## 2018-06-07 RX ORDER — PHENTERMINE HYDROCHLORIDE 37.5 MG/1
37.5 CAPSULE ORAL EVERY MORNING
Qty: 90 CAP | Refills: 0 | Status: SHIPPED | OUTPATIENT
Start: 2018-06-07 | End: 2018-09-05

## 2018-06-07 ASSESSMENT — ENCOUNTER SYMPTOMS
PSYCHIATRIC NEGATIVE: 1
GASTROINTESTINAL NEGATIVE: 1
CONSTITUTIONAL NEGATIVE: 1
CARDIOVASCULAR NEGATIVE: 1
RESPIRATORY NEGATIVE: 1
MUSCULOSKELETAL NEGATIVE: 1
EYES NEGATIVE: 1
NEUROLOGICAL NEGATIVE: 1

## 2018-06-07 NOTE — LETTER
June 7, 2018       Patient: Karine Ovalle   YOB: 1965   Date of Visit: 6/7/2018         To Whom It May Concern:    It is my medical opinion that Karine Ovalle return to full duty, no restrictions. On June 11, 2018.    If you have any questions or concerns, please don't hesitate to call [phone]          Sincerely,          Micky Rubin M.D.  Electronically Signed

## 2018-06-07 NOTE — PROGRESS NOTES
Subjective:      Karine Ovalle is a 53 y.o. female who presents with Follow-Up (discuss release to work)  The patient is here for followup of chronic medical problems listed below. The patient is compliant with medications and having no side effects from them. Denies chest pain, abdominal pain, dyspnea, myalgias, or cough.   Patient Active Problem List    Diagnosis Date Noted   • Mild intermittent asthma without complication 04/25/2012     Priority: High   • Primary osteoarthritis of left hip- dr nowak; needs THR 06/07/2018   • Obesity (BMI 30-39.9) 06/07/2018   • Hypokalemia 06/07/2018   • DDD (degenerative disc disease), lumbar 04/25/2018   • Status post right hip replacement 03/20/2018   • Disability of walking- due to severe bilateral hip DJD- needs bilat THR- placed on full disability 2/5/18-2/5/19 02/05/2018   • Uncomplicated opioid dependence (CMS-[facility])-  01/31/2018   • Essential hypertension 01/31/2018   • Primary osteoarthritis of right hip- severe; THR done 3/20/18- DR NOWAK 01/22/2018   • Lymphedema 10/17/2017   • Debility- FMLA through june 13, 2018 10/12/2017   • Venous insufficiency 10/03/2017   • Colon cancer screening- needs 08/09/2017   • Chronic bilateral low back pain with bilateral sciatica- pain mgt 07/26/2017   • Vitamin B 12 deficiency 06/02/2016   • Fibroids 08/13/2013     Allergies   Allergen Reactions   • Zofran [Dextrose-Ondansetron] Nausea     Outpatient Medications Prior to Visit   Medication Sig Dispense Refill   • meloxicam (MOBIC) 7.5 MG Tab      • omeprazole (PRILOSEC) 20 MG delayed-release capsule Take 1 Cap by mouth every day. 30 Cap 0   • cyanocobalamin (VITAMIN B12) 1000 MCG Tab Take 1 Tab by mouth every day. 90 Tab 4   • multivitamin (THERAGRAN) Tab Take 1 Tab by mouth every day.     • vitamin D (CHOLECALCIFEROL) 1000 UNIT Tab Take 1,000 Units by mouth every day.     • potassium chloride ER (KLOR-CON) 10 MEQ tablet Take 2 Tabs by mouth every day. 180 Tab 4   • morphine ER  (MS CONTIN) 30 MG Tab CR tablet Take 1 Tab by mouth every 12 hours for 30 days. 60 Tab 0   • oxyCODONE-acetaminophen (PERCOCET-10)  MG Tab Take 1 Tab by mouth every 6 hours as needed for Severe Pain for up to 30 days. 120 Tab 0   • furosemide (LASIX) 40 MG Tab Take 40 mg by mouth every day.     • oxyCODONE-acetaminophen (PERCOCET-10)  MG Tab Take 1-2 Tabs by mouth every 8 hours as needed for Severe Pain.       No facility-administered medications prior to visit.      And the patient is here for follow-up of her ongoing medical disability which is scheduled and a week from today. She says she is ready to go back to work.  She is now ambulating reasonably well with a cane.  She is graduated from the wheelchair to the walker and now slowly moving adequately with a cane.  Has not fallen.  She continues with her rehab.  She still has significant edema and goes to the edema clinic and is hoping to get a pump for her legs to control this.    She tells me that she now needs her left hip replaced according to her orthopedic surgeon, Dr. Levine, and this will be scheduled for either later this year or next.  She wants to try to get through the rehab of her right hip and edema problems before proceeding to that.    Also here for follow-up of her chronic pain syndrome.  She was referred to pain management a month ago and given pain medication to last her until today.  She says she made the appointment with pain management but they cannot see her until the end of this month.  So therefore she needs a another refill of her Percocet and MS Contin.    Finally she needs refills of her Lasix and potassium for her edema.  This is been recently well controlled with 40 mEq of Lasix daily and 20 mg of potassium daily.                HPI    Review of Systems   Constitutional: Negative.    HENT: Negative.    Eyes: Negative.    Respiratory: Negative.    Cardiovascular: Negative.    Gastrointestinal: Negative.    Genitourinary:  "Negative.    Musculoskeletal: Negative.    Skin: Negative.    Neurological: Negative.    Endo/Heme/Allergies: Negative.    Psychiatric/Behavioral: Negative.           Objective:     /76   Pulse (!) 111   Temp 37.5 °C (99.5 °F)   Ht 1.6 m (5' 2.99\")   Wt 95.3 kg (210 lb)   LMP 05/17/2017   SpO2 91%   BMI 37.21 kg/m²      Physical Exam   Constitutional: She is oriented to person, place, and time. She appears well-developed and well-nourished. No distress.   HENT:   Head: Normocephalic and atraumatic.   Right Ear: External ear normal.   Left Ear: External ear normal.   Nose: Nose normal.   Mouth/Throat: Oropharynx is clear and moist. No oropharyngeal exudate.   Eyes: Conjunctivae and EOM are normal. Pupils are equal, round, and reactive to light. Right eye exhibits no discharge. Left eye exhibits no discharge. No scleral icterus.   Neck: Normal range of motion. Neck supple. No JVD present. No tracheal deviation present. No thyromegaly present.   Cardiovascular: Normal rate, regular rhythm, normal heart sounds and intact distal pulses.  Exam reveals no gallop and no friction rub.    No murmur heard.  Pulmonary/Chest: Effort normal and breath sounds normal. No stridor. No respiratory distress. She has no wheezes. She has no rales. She exhibits no tenderness.   Abdominal: Soft. Bowel sounds are normal. She exhibits no distension and no mass. There is no tenderness. There is no rebound and no guarding.   Musculoskeletal: Normal range of motion. She exhibits no edema or tenderness.   Lymphadenopathy:     She has no cervical adenopathy.   Neurological: She is alert and oriented to person, place, and time. She has normal reflexes. She displays normal reflexes. No cranial nerve deficit. She exhibits normal muscle tone. Coordination normal.   Skin: Skin is warm and dry. No rash noted. She is not diaphoretic. No erythema. No pallor.   Psychiatric: She has a normal mood and affect. Her behavior is normal. Judgment " and thought content normal.   Vitals reviewed.    Hospital Outpatient Visit on 05/16/2018   Component Date Value   • Sodium 05/16/2018 137    • Potassium 05/16/2018 4.4    • Chloride 05/16/2018 107    • Co2 05/16/2018 23    • Glucose 05/16/2018 75    • Bun 05/16/2018 15    • Creatinine 05/16/2018 0.79    • Calcium 05/16/2018 8.7    • Anion Gap 05/16/2018 7.0    • GFR If  05/16/2018 >60    • GFR If Non  Ameri* 05/16/2018 >60       Lab Results   Component Value Date/Time    HBA1C 5.3 06/12/2017 09:03 AM     Lab Results   Component Value Date/Time    SODIUM 137 05/16/2018 10:40 AM    POTASSIUM 4.4 05/16/2018 10:40 AM    CHLORIDE 107 05/16/2018 10:40 AM    CO2 23 05/16/2018 10:40 AM    GLUCOSE 75 05/16/2018 10:40 AM    BUN 15 05/16/2018 10:40 AM    CREATININE 0.79 05/16/2018 10:40 AM    CREATININE 0.88 08/14/2010 12:00 AM    BUNCREATRAT 13 08/14/2010 12:00 AM    GLOMRATE >59 08/14/2010 12:00 AM    ALKPHOSPHAT 90 02/22/2018 05:05 PM    ASTSGOT 16 02/22/2018 05:05 PM    ALTSGPT 8 02/22/2018 05:05 PM    TBILIRUBIN 0.4 02/22/2018 05:05 PM     Lab Results   Component Value Date/Time    INR 1.04 05/18/2012 12:20 PM     Lab Results   Component Value Date/Time    CHOLSTRLTOT 168 02/22/2018 05:05 PM    LDL 84 02/22/2018 05:05 PM    HDL 67 02/22/2018 05:05 PM    TRIGLYCERIDE 85 02/22/2018 05:05 PM       No results found for: TESTOSTERONE  Lab Results   Component Value Date/Time    TSH 2.110 08/14/2010 12:00 AM     Lab Results   Component Value Date/Time    FREET4 0.72 05/03/2016 07:32 AM     Lab Results   Component Value Date/Time    URICACID 5.6 08/12/2014 04:49 PM     No components found for: VITB12  Lab Results   Component Value Date/Time    25HYDROXY 33 08/12/2014 04:49 PM    25HYDROXY 25 (L) 07/03/2012 09:20 AM                  Assessment/Plan:     1. Primary osteoarthritis of right hip- severe; THR done 3/20/18- DR JERRY   Under good control. Continue same regimen.  - 2. Uncomplicated opioid  dependence (CMS-[facility])-       - oxyCODONE-acetaminophen (PERCOCET-10)  MG Tab; Take 1 Tab by mouth every 6 hours as needed for Severe Pain for up to 30 days.  Dispense: 120 Tab; Refill: 0  - morphine ER (MS CONTIN) 30 MG Tab CR tablet; Take 1 Tab by mouth every 12 hours for 30 days.  Dispense: 60 Tab; Refill: 0    3. DDD (degenerative disc disease), lumbar     oxyCODONE-acetaminophen (PERCOCET-10)  MG Tab; Take 1 Tab by mouth every 6 hours as needed for Severe Pain for up to 30 days.  Dispense: 120 Tab; Refill: 0  -       4. Essential hypertension   Under good control. Continue same regimen.      5. Mild intermittent asthma without complication   Under good control. Continue same regimen.      6. Chronic bilateral low back pain with bilateral sciatica- pain mgt     - oxyCODONE-acetaminophen (PERCOCET-10)  MG Tab; Take 1 Tab by mouth every 6 hours as needed for Severe Pain for up to 30 days.  Dispense: 120 Tab; Refill: 0  morphine ER (MS CONTIN) 30 MG Tab CR tablet; Take 1 Tab by mouth every 12 hours for 30 days.  Dispense: 60 Tab; Refill: 0    7. Disability of walking- due to severe bilateral hip DJD- needs bilat THR- placed on full disability 2/5/18-2/5/19  Right hip pain resolved following right hip arthroplasty 3 months ago.  Left pain of the hip due to DJD  persists as does back pain due to DDD.    8. Primary osteoarthritis of left hip  As above.    9. Lymphedema   Under good control. Continue same regimen.'    - furosemide (LASIX) 40 MG Tab; Take 1 Tab by mouth every day.  Dispense: 90 Tab; Refill: 3    10. Primary osteoarthritis of right hip- severe; need THR; REF ORTHO DR JERRY      - oxyCODONE-acetaminophen (PERCOCET-10)  MG Tab; Take 1 Tab by mouth every 6 hours as needed for Severe Pain for up to 30 days.  Dispense: 120 Tab; Refill: 0  - morphine ER (MS CONTIN) 30 MG Tab CR tablet; Take 1 Tab by mouth every 12 hours for 30 days.  Dispense: 60 Tab; Refill: 0    11. Uncomplicated  opioid dependence (CMS-[facility])- pcp dr hackett to rx until july 2018     - oxyCODONE-acetaminophen (PERCOCET-10)  MG Tab; Take 1 Tab by mouth every 6 hours as needed for Severe Pain for up to 30 days.  Dispense: 120 Tab; Refill: 0  - morphine ER (MS CONTIN) 30 MG Tab CR tablet; Take 1 Tab by mouth every 12 hours for 30 days.  Dispense: 60 Tab; Refill: 0    12. Obesity (BMI 30-39.9)   not at goal; resume :  - phentermine 37.5 MG capsule; Take 1 Cap by mouth every morning for 90 days.  Dispense: 90 Cap; Refill: 0    13. Edema of both legs   above; needs leg pumps  14. Hypokalemia   Under good control. Continue same regimen.'  - potassium chloride SA (KDUR) 20 MEQ Tab CR; Take 1 Tab by mouth every day.  Dispense: 90 Tab; Refill: 4    15. Screening for colorectal cancer            - REFERRAL TO GI FOR COLONOSCOPY  - OCCULT BLOOD FECES IMMUNOASSAY (FIT); Future  - COLOGUARD (FIT DNA)    17. Status post right hip replacement-good result from March 2018.  Continue with physical therapy   Under good control. Continue same regimen.      18. Debility- FMLA through june 13, 2018  Disability forms signed off on inpatient form for release to return to work signed as well.    19. Venous insufficiency   Under good control. Continue same regimen.'    20. Vitamin B 12 deficiency       Under good control. Continue same regimen.      40 minute face-to-face encounter took place today.  More than half of this time was spent in the coordination of care of the above problems, as well as counseling.

## 2018-06-11 ENCOUNTER — APPOINTMENT (OUTPATIENT)
Dept: PHYSICAL THERAPY | Facility: REHABILITATION | Age: 53
End: 2018-06-11
Payer: COMMERCIAL

## 2018-06-11 ENCOUNTER — TELEPHONE (OUTPATIENT)
Dept: VASCULAR LAB | Facility: MEDICAL CENTER | Age: 53
End: 2018-06-11

## 2018-06-11 NOTE — TELEPHONE ENCOUNTER
LM on  regarding the pt's lymphedema. Requested that she call me back to let me know where she is getting her wound clinic treatments. In the past I asked her to call back when she had completed 30 days of treatment then I would order her a pump it she has not gotten any better. She still has not called back from that request.Her next appt is July 17. I hope to hear from her before then.  ЮЛИЯ Martin.

## 2018-06-13 ENCOUNTER — TELEPHONE (OUTPATIENT)
Dept: MEDICAL GROUP | Age: 53
End: 2018-06-13

## 2018-06-13 DIAGNOSIS — I89.0 LYMPH EDEMA: ICD-10-CM

## 2018-06-13 NOTE — TELEPHONE ENCOUNTER
1. Caller Name: Karine Ovalle                                           Call Back Number: mychart message      Patient approves a detailed voicemail message: N\A    2. SPECIFIC Action To Be Taken: Referral pending, please sign.    3. Diagnosis/Clinical Reason for Request: lymph edema    4. Specialty & Provider Name/Lab/Imaging Location: physical therapy    5. Is appointment scheduled for requested order/referral: no    Patient was informed they will receive a return phone call from the office ONLY if there are any questions before processing their request. Advised to call back if they haven't received a call from the referral department in 5 days.

## 2018-06-20 ENCOUNTER — TELEPHONE (OUTPATIENT)
Dept: MEDICAL GROUP | Age: 53
End: 2018-06-20

## 2018-06-20 DIAGNOSIS — M54.41 CHRONIC BILATERAL LOW BACK PAIN WITH BILATERAL SCIATICA: ICD-10-CM

## 2018-06-20 DIAGNOSIS — F11.20 UNCOMPLICATED OPIOID DEPENDENCE (HCC): ICD-10-CM

## 2018-06-20 DIAGNOSIS — M54.42 CHRONIC BILATERAL LOW BACK PAIN WITH BILATERAL SCIATICA: ICD-10-CM

## 2018-06-20 DIAGNOSIS — M51.36 DDD (DEGENERATIVE DISC DISEASE), LUMBAR: ICD-10-CM

## 2018-06-20 DIAGNOSIS — G89.29 CHRONIC BILATERAL LOW BACK PAIN WITH BILATERAL SCIATICA: ICD-10-CM

## 2018-06-20 DIAGNOSIS — M16.11 PRIMARY OSTEOARTHRITIS OF RIGHT HIP: ICD-10-CM

## 2018-06-20 RX ORDER — MORPHINE SULFATE 30 MG/1
30 TABLET, FILM COATED, EXTENDED RELEASE ORAL 2 TIMES DAILY PRN
Qty: 10 TAB | Refills: 0 | Status: SHIPPED | OUTPATIENT
Start: 2018-06-20 | End: 2018-06-25

## 2018-06-20 NOTE — TELEPHONE ENCOUNTER
VOICEMAIL  1. Caller Name: Karine Ovalle                        Call Back Number: [phone] (home) [phone] (work)      2. Message: patient only received 50 out of 60 of Morphine 30 mg pharmacy only had 50 in stock she will be [facility] 06/30/2018 [facility]  [facility] this month said that you need to write for this month       3. Patient approves office to leave a detailed voicemail/MyChart message: yes     Should I just call and schedule her to be seen on 06/29/18 for med management

## 2018-06-25 NOTE — TELEPHONE ENCOUNTER
Phone Number Called: [phone] (home) [phone] (work)       Message: rx ready to be picked up at office    Left Message for patient to call back: N\A

## 2018-06-26 DIAGNOSIS — H10.13 ALLERGIC CONJUNCTIVITIS OF BOTH EYES: ICD-10-CM

## 2018-06-26 RX ORDER — OLOPATADINE HYDROCHLORIDE 1 MG/ML
1 SOLUTION/ DROPS OPHTHALMIC 2 TIMES DAILY
Qty: 5 ML | Refills: 11 | Status: SHIPPED | OUTPATIENT
Start: 2018-06-26 | End: 2018-12-04

## 2018-06-26 NOTE — TELEPHONE ENCOUNTER
Was the patient seen in the last year in this department? Yes     Does patient have an active prescription for medications requested? No     Received Request Via: Patient           Per staff message

## 2018-07-06 ENCOUNTER — TELEPHONE (OUTPATIENT)
Dept: MEDICAL GROUP | Age: 53
End: 2018-07-06

## 2018-07-06 ENCOUNTER — HOSPITAL ENCOUNTER (OUTPATIENT)
Dept: LAB | Facility: MEDICAL CENTER | Age: 53
End: 2018-07-06
Attending: FAMILY MEDICINE
Payer: COMMERCIAL

## 2018-07-06 ENCOUNTER — OFFICE VISIT (OUTPATIENT)
Dept: URGENT CARE | Facility: PHYSICIAN GROUP | Age: 53
End: 2018-07-06
Payer: COMMERCIAL

## 2018-07-06 VITALS
SYSTOLIC BLOOD PRESSURE: 128 MMHG | DIASTOLIC BLOOD PRESSURE: 90 MMHG | BODY MASS INDEX: 39.87 KG/M2 | WEIGHT: 225 LBS | TEMPERATURE: 98.9 F | OXYGEN SATURATION: 99 % | HEART RATE: 100 BPM

## 2018-07-06 DIAGNOSIS — R60.0 BILATERAL LEG EDEMA: ICD-10-CM

## 2018-07-06 LAB
ANION GAP SERPL CALC-SCNC: 11 MMOL/L (ref 0–11.9)
BUN SERPL-MCNC: 13 MG/DL (ref 8–22)
CALCIUM SERPL-MCNC: 9.4 MG/DL (ref 8.5–10.5)
CHLORIDE SERPL-SCNC: 104 MMOL/L (ref 96–112)
CO2 SERPL-SCNC: 25 MMOL/L (ref 20–33)
CREAT SERPL-MCNC: 0.76 MG/DL (ref 0.5–1.4)
GLUCOSE SERPL-MCNC: 64 MG/DL (ref 65–99)
POTASSIUM SERPL-SCNC: 3.7 MMOL/L (ref 3.6–5.5)
SODIUM SERPL-SCNC: 140 MMOL/L (ref 135–145)

## 2018-07-06 PROCEDURE — 80048 BASIC METABOLIC PNL TOTAL CA: CPT

## 2018-07-06 PROCEDURE — 36415 COLL VENOUS BLD VENIPUNCTURE: CPT

## 2018-07-06 RX ORDER — MORPHINE SULFATE 30 MG/1
30 TABLET ORAL EVERY 4 HOURS PRN
COMMUNITY
End: 2018-07-09 | Stop reason: SDUPTHER

## 2018-07-06 RX ORDER — METAXALONE 800 MG/1
TABLET ORAL
COMMUNITY
Start: 2018-05-31 | End: 2018-12-04

## 2018-07-06 NOTE — TELEPHONE ENCOUNTER
1. Caller Name: Karine Ovalle                                           Call Back Number: [phone] (home) [phone] (work)        Patient approves a detailed voicemail message: yes and N\A    will keep appointment with Dr joseph on monday wanted to come in today informed patient no openings

## 2018-07-09 ENCOUNTER — OFFICE VISIT (OUTPATIENT)
Dept: MEDICAL GROUP | Age: 53
End: 2018-07-09
Payer: COMMERCIAL

## 2018-07-09 VITALS
OXYGEN SATURATION: 91 % | BODY MASS INDEX: 36.32 KG/M2 | HEART RATE: 108 BPM | TEMPERATURE: 98.8 F | WEIGHT: 205 LBS | DIASTOLIC BLOOD PRESSURE: 72 MMHG | SYSTOLIC BLOOD PRESSURE: 128 MMHG | HEIGHT: 63 IN

## 2018-07-09 DIAGNOSIS — M54.41 CHRONIC BILATERAL LOW BACK PAIN WITH BILATERAL SCIATICA: ICD-10-CM

## 2018-07-09 DIAGNOSIS — Z12.11 SCREENING FOR COLORECTAL CANCER: ICD-10-CM

## 2018-07-09 DIAGNOSIS — M51.36 DDD (DEGENERATIVE DISC DISEASE), LUMBAR: ICD-10-CM

## 2018-07-09 DIAGNOSIS — M16.11 PRIMARY OSTEOARTHRITIS OF RIGHT HIP: ICD-10-CM

## 2018-07-09 DIAGNOSIS — F11.20 UNCOMPLICATED OPIOID DEPENDENCE (HCC): ICD-10-CM

## 2018-07-09 DIAGNOSIS — I89.0 LYMPHEDEMA: ICD-10-CM

## 2018-07-09 DIAGNOSIS — M16.12 PRIMARY OSTEOARTHRITIS OF LEFT HIP: ICD-10-CM

## 2018-07-09 DIAGNOSIS — E66.9 OBESITY (BMI 30-39.9): ICD-10-CM

## 2018-07-09 DIAGNOSIS — G89.29 CHRONIC BILATERAL LOW BACK PAIN WITH BILATERAL SCIATICA: ICD-10-CM

## 2018-07-09 DIAGNOSIS — I10 ESSENTIAL HYPERTENSION: ICD-10-CM

## 2018-07-09 DIAGNOSIS — Z12.12 SCREENING FOR COLORECTAL CANCER: ICD-10-CM

## 2018-07-09 DIAGNOSIS — E87.6 HYPOKALEMIA: ICD-10-CM

## 2018-07-09 DIAGNOSIS — M54.42 CHRONIC BILATERAL LOW BACK PAIN WITH BILATERAL SCIATICA: ICD-10-CM

## 2018-07-09 DIAGNOSIS — J45.20 MILD INTERMITTENT ASTHMA WITHOUT COMPLICATION: ICD-10-CM

## 2018-07-09 PROBLEM — R53.81 DEBILITY: Status: RESOLVED | Noted: 2017-10-12 | Resolved: 2018-07-09

## 2018-07-09 PROCEDURE — 99214 OFFICE O/P EST MOD 30 MIN: CPT | Performed by: INTERNAL MEDICINE

## 2018-07-09 RX ORDER — OXYCODONE AND ACETAMINOPHEN 10; 325 MG/1; MG/1
1 TABLET ORAL EVERY 8 HOURS PRN
Qty: 90 TAB | Refills: 0 | Status: SHIPPED | OUTPATIENT
Start: 2018-07-09 | End: 2018-08-08

## 2018-07-09 RX ORDER — MORPHINE SULFATE 30 MG/1
30 TABLET ORAL
Qty: 60 TAB | Refills: 0 | Status: SHIPPED | OUTPATIENT
Start: 2018-07-09 | End: 2018-09-07

## 2018-07-09 ASSESSMENT — ENCOUNTER SYMPTOMS
CONSTITUTIONAL NEGATIVE: 1
EYES NEGATIVE: 1
GASTROINTESTINAL NEGATIVE: 1
NEUROLOGICAL NEGATIVE: 1
PSYCHIATRIC NEGATIVE: 1
BACK PAIN: 1
RESPIRATORY NEGATIVE: 1

## 2018-07-09 NOTE — LETTER
July 9, 2018         Patient: Karine Ovalle   YOB: 1965   Date of Visit: 7/9/2018           To Whom it May Concern:    Karine Ovalle was seen in my clinic on 7/9/2018.     If you have any questions or concerns, please don't hesitate to call.        Sincerely,           Micky Rubin M.D.  Electronically Signed

## 2018-07-09 NOTE — PROGRESS NOTES
Subjective:      Karine Ovalle is a 53 y.o. female who presents with Follow-Up  The patient is here for followup of chronic medical problems listed below. The patient is compliant with medications and having no side effects from them. Denies chest pain, abdominal pain, dyspnea, myalgias, or cough.     PT BACK TO WORK AT [employer]. BUT STILL HAVING BACK AND LEFT HI PAIN AND NEEDS LEFT THR        Patient Active Problem List    Diagnosis Date Noted   • Mild intermittent asthma without complication 04/25/2012     Priority: High   • Primary osteoarthritis of left hip- dr nowak; needs left THR 06/07/2018   • Obesity (BMI 30-39.9) 06/07/2018   • Hypokalemia 06/07/2018   • DDD (degenerative disc disease), lumbar 04/25/2018   • Uncomplicated opioid dependence (CMS-HCC)-  01/31/2018   • Essential hypertension 01/31/2018   • Venous insufficiency 10/03/2017   • Colon cancer screening- needs 08/09/2017   • Chronic bilateral low back pain with bilateral sciatica- pain mgt 07/26/2017   • Vitamin B 12 deficiency 06/02/2016   • Fibroids 08/13/2013     Allergies   Allergen Reactions   • Zofran [Dextrose-Ondansetron] Nausea       Outpatient Medications Prior to Visit   Medication Sig Dispense Refill   • metaxalone (SKELAXIN) 800 MG Tab      • olopatadine (PATANOL) 0.1 % ophthalmic solution Place 1 Drop in both eyes 2 times a day. 5 mL 11   • phentermine 37.5 MG capsule Take 1 Cap by mouth every morning for 90 days. 90 Cap 0   • furosemide (LASIX) 40 MG Tab Take 1 Tab by mouth every day. 90 Tab 3   • potassium chloride SA (KDUR) 20 MEQ Tab CR Take 1 Tab by mouth every day. 90 Tab 4   • meloxicam (MOBIC) 7.5 MG Tab      • omeprazole (PRILOSEC) 20 MG delayed-release capsule Take 1 Cap by mouth every day. 30 Cap 0   • cyanocobalamin (VITAMIN B12) 1000 MCG Tab Take 1 Tab by mouth every day. 90 Tab 4   • multivitamin (THERAGRAN) Tab Take 1 Tab by mouth every day.     • vitamin D (CHOLECALCIFEROL) 1000 UNIT Tab Take 1,000 Units by  "mouth every day.     • morphine (MS IR) 30 MG tablet Take 30 mg by mouth every four hours as needed for Severe Pain.       No facility-administered medications prior to visit.      Allergies   Allergen Reactions   • Zofran [Dextrose-Ondansetron] Nausea               HPI    Review of Systems   Constitutional: Negative.    HENT: Negative.    Eyes: Negative.    Respiratory: Negative.    Cardiovascular: Positive for leg swelling.   Gastrointestinal: Negative.    Genitourinary: Negative.    Musculoskeletal: Positive for back pain and joint pain.   Skin: Negative.    Neurological: Negative.    Endo/Heme/Allergies: Negative.    Psychiatric/Behavioral: Negative.           Objective:     /72   Pulse (!) 108   Temp 37.1 °C (98.8 °F)   Ht 1.6 m (5' 2.99\")   Wt 93 kg (205 lb)   LMP 05/17/2017   SpO2 91%   BMI 36.32 kg/m²      Physical Exam   Constitutional: She is oriented to person, place, and time. She appears well-developed and well-nourished. No distress.   HENT:   Head: Normocephalic and atraumatic.   Right Ear: External ear normal.   Left Ear: External ear normal.   Nose: Nose normal.   Mouth/Throat: Oropharynx is clear and moist. No oropharyngeal exudate.   Eyes: Conjunctivae and EOM are normal. Pupils are equal, round, and reactive to light. Right eye exhibits no discharge. Left eye exhibits no discharge. No scleral icterus.   Neck: Normal range of motion. Neck supple. No JVD present. No tracheal deviation present. No thyromegaly present.   Cardiovascular: Normal rate, regular rhythm, normal heart sounds and intact distal pulses.  Exam reveals no gallop and no friction rub.    No murmur heard.  Pulmonary/Chest: Effort normal and breath sounds normal. No stridor. No respiratory distress. She has no wheezes. She has no rales. She exhibits no tenderness.   Abdominal: Soft. Bowel sounds are normal. She exhibits no distension. There is no tenderness. There is no rebound and no guarding.   Musculoskeletal: Normal " range of motion. She exhibits edema and tenderness.   BACK AND LEFT HIP TENDRNESS; NEG SLR; NO CALF TENDERNESS   Lymphadenopathy:     She has no cervical adenopathy.   Neurological: She is alert and oriented to person, place, and time. She has normal reflexes. She displays normal reflexes. No cranial nerve deficit. She exhibits normal muscle tone. Coordination normal.   Skin: Skin is warm and dry. No rash noted. She is not diaphoretic. No erythema. No pallor.   Psychiatric: She has a normal mood and affect. Her behavior is normal. Judgment and thought content normal.   Vitals reviewed.    Hospital Outpatient Visit on 07/06/2018   Component Date Value   • Sodium 07/06/2018 140    • Potassium 07/06/2018 3.7    • Chloride 07/06/2018 104    • Co2 07/06/2018 25    • Glucose 07/06/2018 64*   • Bun 07/06/2018 13    • Creatinine 07/06/2018 0.76    • Calcium 07/06/2018 9.4    • Anion Gap 07/06/2018 11.0    • GFR If  07/06/2018 >60    • GFR If Non  Ameri* 07/06/2018 >60       A.LLL              Hospital Outpatient Visit on 07/06/2018   Component Date Value   • Sodium 07/06/2018 140    • Potassium 07/06/2018 3.7    • Chloride 07/06/2018 104    • Co2 07/06/2018 25    • Glucose 07/06/2018 64*   • Bun 07/06/2018 13    • Creatinine 07/06/2018 0.76    • Calcium 07/06/2018 9.4    • Anion Gap 07/06/2018 11.0    • GFR If  07/06/2018 >60    • GFR If Non  Ameri* 07/06/2018 >60       Lab Results   Component Value Date/Time    HBA1C 5.3 06/12/2017 09:03 AM     Lab Results   Component Value Date/Time    SODIUM 140 07/06/2018 11:41 AM    POTASSIUM 3.7 07/06/2018 11:41 AM    CHLORIDE 104 07/06/2018 11:41 AM    CO2 25 07/06/2018 11:41 AM    GLUCOSE 64 (L) 07/06/2018 11:41 AM    BUN 13 07/06/2018 11:41 AM    CREATININE 0.76 07/06/2018 11:41 AM    CREATININE 0.88 08/14/2010 12:00 AM    BUNCREATRAT 13 08/14/2010 12:00 AM    GLOMRATE >59 08/14/2010 12:00 AM    ALKPHOSPHAT 90 02/22/2018 05:05 PM     ASTSGOT 16 02/22/2018 05:05 PM    ALTSGPT 8 02/22/2018 05:05 PM    TBILIRUBIN 0.4 02/22/2018 05:05 PM     Lab Results   Component Value Date/Time    INR 1.04 05/18/2012 12:20 PM     Lab Results   Component Value Date/Time    CHOLSTRLTOT 168 02/22/2018 05:05 PM    LDL 84 02/22/2018 05:05 PM    HDL 67 02/22/2018 05:05 PM    TRIGLYCERIDE 85 02/22/2018 05:05 PM       No results found for: TESTOSTERONE  Lab Results   Component Value Date/Time    TSH 2.110 08/14/2010 12:00 AM     Lab Results   Component Value Date/Time    FREET4 0.72 05/03/2016 07:32 AM     Lab Results   Component Value Date/Time    URICACID 5.6 08/12/2014 04:49 PM     No components found for: VITB12  Lab Results   Component Value Date/Time    25HYDROXY 33 08/12/2014 04:49 PM    25HYDROXY 25 (L) 07/03/2012 09:20 AM       Assessment/Plan:     1. Screening for colorectal cancer      - OCCULT BLOOD FECES IMMUNOASSAY; Future    2. Primary osteoarthritis of left hip- dr nowak; needs left THR   SEE BELOW    3. Chronic bilateral low back pain with bilateral sciatica- pain mgt         - MR-LUMBAR SPINE-WITH; Future  - morphine (MS IR) 30 MG tablet; Take 1 Tab by mouth 2 Times a Day for 60 days.  Dispense: 60 Tab; Refill: 0  - oxyCODONE-acetaminophen (PERCOCET-10)  MG Tab; Take 1 Tab by mouth every 8 hours as needed for Severe Pain for up to 30 days.  Dispense: 90 Tab; Refill: 0    4. Lymphedema     [facility]    5. Essential hypertension   Under good control. Continue same regimen.      6. Mild intermittent asthma without complication     Under good control. Continue same regimen.    7. Uncomplicated opioid dependence (CMS-HCC)-     Under good control. Continue same regimen.  - morphine (MS IR) 30 MG tablet; Take 1 Tab by mouth 2 Times a Day for 60 days.  Dispense: 60 Tab; Refill: 0  - oxyCODONE-acetaminophen (PERCOCET-10)  MG Tab; Take 1 Tab by mouth every 8 hours as needed for Severe Pain for up to 30 days.  Dispense: 90 Tab; Refill: 0    8.  Obesity (BMI 30-39.9)    diet/exercise/lose 15 lbs.; patient counseled      9. Hypokalemia    Under good control. Continue same regimen.  10. DDD (degenerative disc disease), lumbar      - morphine (MS IR) 30 MG tablet; Take 1 Tab by mouth 2 Times a Day for 60 days.  Dispense: 60 Tab; Refill: 0  - oxyCODONE-acetaminophen (PERCOCET-10)  MG Tab; Take 1 Tab by mouth every 8 hours as needed for Severe Pain for up to 30 days.  Dispense: 90 Tab; Refill: 0    11. Uncomplicated opioid dependence (CMS-HCC)- pcp dr hackett to rx    Under good control. Continue same regimen.  - morphine (MS IR) 30 MG tablet; Take 1 Tab by mouth 2 Times a Day for 60 days.  Dispense: 60 Tab; Refill: 0  - oxyCODONE-acetaminophen (PERCOCET-10)  MG Tab; Take 1 Tab by mouth every 8 hours as needed for Severe Pain for up to 30 days.  Dispense: 90 Tab; Refill: 0    12. Primary osteoarthritis of right hip- severe; THR done 3/20/18- DR JERRY    Under good control. Continue same regimen.- morphine (MS IR) 30 MG tablet; Take 1 Tab by mouth 2 Times a Day for 60 days.  Dispense: 60 Tab; Refill: 0  - oxyCODONE-acetaminophen (PERCOCET-10)  MG Tab; Take 1 Tab by mouth every 8 hours as needed for Severe Pain for up to 30 days.  Dispense: 90 Tab; Refill: 0     Under good control. Continue same regimen.      40 minute face-to-face encounter took place today.  More than half of this time was spent in the coordination of care of the above problems, as well as counseling.

## 2018-07-10 ENCOUNTER — PATIENT MESSAGE (OUTPATIENT)
Dept: MEDICAL GROUP | Age: 53
End: 2018-07-10

## 2018-07-10 ENCOUNTER — TELEPHONE (OUTPATIENT)
Dept: MEDICAL GROUP | Age: 53
End: 2018-07-10

## 2018-07-10 DIAGNOSIS — F11.20 UNCOMPLICATED OPIOID DEPENDENCE (HCC): ICD-10-CM

## 2018-07-10 DIAGNOSIS — M16.11 PRIMARY OSTEOARTHRITIS OF RIGHT HIP: ICD-10-CM

## 2018-07-10 RX ORDER — MORPHINE SULFATE 30 MG/1
30 TABLET, FILM COATED, EXTENDED RELEASE ORAL EVERY 12 HOURS
Qty: 60 TAB | Refills: 0 | Status: SHIPPED | OUTPATIENT
Start: 2018-07-10 | End: 2018-08-09

## 2018-07-10 NOTE — TELEPHONE ENCOUNTER
1. Caller Name: Qiana Ware [facility]                                         Call Back Number: [phone]       Patient approves a detailed voicemail message: N\A    Qiana from [facility] called to get clarification for Morphine. Confirmed correct Morphine ER. Email was sent to patient to  new rx. However Qiana informed shouldnt be needed, since they are not changing anything on RX. Called informed patient [facility] is working on RX.

## 2018-07-11 ENCOUNTER — PATIENT MESSAGE (OUTPATIENT)
Dept: MEDICAL GROUP | Age: 53
End: 2018-07-11

## 2018-07-11 NOTE — TELEPHONE ENCOUNTER
FINAL PRIOR AUTHORIZATION STATUS:    1.  Name of Medication & Dose: morphine ER (MS CONTIN) 30 MG Tab CR tablet     2. Prior Auth Status: Approved through 07/31/2019     3. Action Taken: Pharmacy Notified: yes Patient Notified: yes

## 2018-07-11 NOTE — TELEPHONE ENCOUNTER
From: Karine Ovalle  To: Micky Rubin M.D.  Sent: 7/10/2018 10:58 AM PDT  Subject: Test Result Question    Dr. Rubin,    I did not see the lab blood work of 7/6/2018 on my chart as of yet. I wonder did you happen to see them through another source.    I know that you discussed the results as being normal in regards to my kidneys, but was it the test results of 7/6/18 and not of 5/2018?        Karine Ovalle

## 2018-07-12 ENCOUNTER — TELEPHONE (OUTPATIENT)
Dept: VASCULAR LAB | Facility: MEDICAL CENTER | Age: 53
End: 2018-07-12

## 2018-07-12 NOTE — TELEPHONE ENCOUNTER
Spoke with the pt she has received a lymph pump and is seeing [facility] for her lymph and also the lymph clinic. ЮЛИЯ Martin.

## 2018-07-13 ENCOUNTER — PATIENT MESSAGE (OUTPATIENT)
Dept: INTERNAL MEDICINE | Facility: MEDICAL CENTER | Age: 53
End: 2018-07-13

## 2018-07-13 ENCOUNTER — OFFICE VISIT (OUTPATIENT)
Dept: INTERNAL MEDICINE | Facility: MEDICAL CENTER | Age: 53
End: 2018-07-13
Payer: COMMERCIAL

## 2018-07-13 VITALS
WEIGHT: 205.4 LBS | BODY MASS INDEX: 36.39 KG/M2 | HEART RATE: 110 BPM | TEMPERATURE: 97.7 F | DIASTOLIC BLOOD PRESSURE: 80 MMHG | HEIGHT: 63 IN | OXYGEN SATURATION: 98 % | SYSTOLIC BLOOD PRESSURE: 126 MMHG

## 2018-07-13 DIAGNOSIS — M16.12 PRIMARY OSTEOARTHRITIS OF LEFT HIP: ICD-10-CM

## 2018-07-13 DIAGNOSIS — I87.2 VENOUS INSUFFICIENCY: ICD-10-CM

## 2018-07-13 PROCEDURE — 99213 OFFICE O/P EST LOW 20 MIN: CPT | Mod: GE | Performed by: INTERNAL MEDICINE

## 2018-07-13 NOTE — PATIENT INSTRUCTIONS
Hip Pain  Your hip is the joint between your upper legs and your lower pelvis. The bones, cartilage, tendons, and muscles of your hip joint perform a lot of work each day supporting your body weight and allowing you to move around.  Hip pain can range from a minor ache to severe pain in one or both of your hips. Pain may be felt on the inside of the hip joint near the groin, or the outside near the buttocks and upper thigh. You may have swelling or stiffness as well.  Follow these instructions at home:  · Take medicines only as directed by your health care provider.  · Apply ice to the injured area:  ¨ Put ice in a plastic bag.  ¨ Place a towel between your skin and the bag.  ¨ Leave the ice on for 15-20 minutes at a time, 3-4 times a day.  · Keep your leg raised (elevated) when possible to lessen swelling.  · Avoid activities that cause pain.  · Follow specific exercises as directed by your health care provider.  · Sleep with a pillow between your legs on your most comfortable side.  · Record how often you have hip pain, the location of the pain, and what it feels like.  Contact a health care provider if:  · You are unable to put weight on your leg.  · Your hip is red or swollen or very tender to touch.  · Your pain or swelling continues or worsens after 1 week.  · You have increasing difficulty walking.  · You have a fever.  Get help right away if:  · You have fallen.  · You have a sudden increase in pain and swelling in your hip.  This information is not intended to replace advice given to you by your health care provider. Make sure you discuss any questions you have with your health care provider.  Document Released: 06/07/2011 Document Revised: 05/25/2017 Document Reviewed: 08/14/2014  [employer] Interactive Patient Education © 2017 [employer]

## 2018-07-13 NOTE — LETTER
July 13, 2018        Karine Ovalle was seen today at our clinic. She is excused from work on 7/13/18 but may return to work today (7/13/2018) if she like to. [employer] feel free to call the number above if you have any questions.     Sincerely,          Juanito Nava MD

## 2018-07-13 NOTE — PROGRESS NOTES
Established Patient    Karine presents today with the following:    CC: Hip pain    HPI: 53-year-old female with a past medical history of chronic back pain, DDD, opiate dependency, venous insufficiency and bilateral hip OA S/P Rt hip replacement on 3/20/18 By Dr. Bryon Levine comes to clinic reporting right hip pain. Yesterday she tripped on step while walking and heard pop. She felt a sharp pain. She denies inflammation, warmth, or erythema at the joint area. She came today as she fears she may have damaged her hip replacement and would like to prevent any underlining problem. She uses a brown to ambulate and is still able to walk to work and the office today. She denies fever, chills, or night sweats. Of note, she has a h/o venous insufficiency for which she is scheduled to follow up with vascular surgery on 5/30/18.       Patient Active Problem List    Diagnosis Date Noted   • Mild intermittent asthma without complication 04/25/2012     Priority: High   • Primary osteoarthritis of left hip- dr levine; needs left THR 06/07/2018   • Obesity (BMI 30-39.9) 06/07/2018   • Hypokalemia 06/07/2018   • DDD (degenerative disc disease), lumbar 04/25/2018   • Uncomplicated opioid dependence (CMS-[facility])-  01/31/2018   • Essential hypertension 01/31/2018   • Venous insufficiency 10/03/2017   • Colon cancer screening- needs 08/09/2017   • Chronic bilateral low back pain with bilateral sciatica- pain mgt 07/26/2017   • Vitamin B 12 deficiency 06/02/2016   • Fibroids 08/13/2013       Current Outpatient Prescriptions   Medication Sig Dispense Refill   • morphine ER (MS CONTIN) 30 MG Tab CR tablet Take 1 Tab by mouth every 12 hours for 30 days. 60 Tab 0   • morphine (MS IR) 30 MG tablet Take 1 Tab by mouth 2 Times a Day for 60 days. 60 Tab 0   • oxyCODONE-acetaminophen (PERCOCET-10)  MG Tab Take 1 Tab by mouth every 8 hours as needed for Severe Pain for up to 30 days. 90 Tab 0   • metaxalone (SKELAXIN) 800 MG Tab      •  "olopatadine (PATANOL) 0.1 % ophthalmic solution Place 1 Drop in both eyes 2 times a day. 5 mL 11   • phentermine 37.5 MG capsule Take 1 Cap by mouth every morning for 90 days. 90 Cap 0   • furosemide (LASIX) 40 MG Tab Take 1 Tab by mouth every day. 90 Tab 3   • potassium chloride SA (KDUR) 20 MEQ Tab CR Take 1 Tab by mouth every day. 90 Tab 4   • meloxicam (MOBIC) 7.5 MG Tab      • omeprazole (PRILOSEC) 20 MG delayed-release capsule Take 1 Cap by mouth every day. 30 Cap 0   • cyanocobalamin (VITAMIN B12) 1000 MCG Tab Take 1 Tab by mouth every day. 90 Tab 4   • multivitamin (THERAGRAN) Tab Take 1 Tab by mouth every day.     • vitamin D (CHOLECALCIFEROL) 1000 UNIT Tab Take 1,000 Units by mouth every day.       No current facility-administered medications for this visit.        ROS: As per HPI. Additional pertinent symptoms as noted below.  Constitutional: no fevers, chills, weight change  Eyes: no blurred vision, discharge, eye pain  ENT: no rhinorrhea, sore throat, neck masses  Cardiovascular: no angina, palpitations, PND, orthopnea, edema  Respiratory: no cough, sputum, or dyspnea  GI: no nausea, vomiting, abdominal pain, constipation, or diarrhea  : no dysuria, hematuria, frequency   Skin: no rashes or open wounds  Neurological: no headaches, dizziness, motor/sensory loss  Psychological: no anxiety or depression      /80   Pulse (!) 110   Temp 36.5 °C (97.7 °F)   Ht 1.6 m (5' 2.99\")   Wt 93.2 kg (205 lb 6.4 oz)   LMP 05/17/2017   SpO2 98%   BMI 36.40 kg/m²     Physical Exam   Constitutional:  oriented to person, place, and time. No distress.   Eyes: Pupils are equal, round, and reactive to light. No scleral icterus.  Neck: Neck supple. No thyromegaly present.   Cardiovascular: Tachycardic, regular rhythm and normal heart sounds.  Exam reveals no gallop and no friction rub.  No murmur heard.  Pulmonary/Chest: Breath sounds normal. Chest wall is not dull to percussion.   Musculoskeletal: B/L LE 4+ " pitting edema  Lymphadenopathy: no cervical adenopathy  Neurological: alert and oriented to person, place, and time.   Skin: No cyanosis. Nails show no clubbing.      Note: I have reviewed all pertinent labs and diagnostic tests associated with this visit with specific comments listed under the assessment and plan below    Assessment and Plan    1. Primary osteoarthritis of left hip- dr nowak; needs left THR  - History of right hip OA status post hip replacement on 3/20/18  - Reports hearing a pop experiencing right hip pain after tripping  - Denies inflammation, warmth or erythema to joint area  - No pain on right hip ROM  - Bilateral hip x-ray, future  - Follow-up with PCP    2. Venous insufficiency  - Bilateral LE 4+ pedal edema  - No chest pain or dyspnea  - Last ECHO with EF 60%  - Scheduled for vascular surgery follow-up on 7/30/18        Followup: Return for Follow up with PCP.      Signed by: Juanito Nava M.D.

## 2018-07-16 ENCOUNTER — OFFICE VISIT (OUTPATIENT)
Dept: MEDICAL GROUP | Facility: MEDICAL CENTER | Age: 53
End: 2018-07-16
Payer: COMMERCIAL

## 2018-07-16 VITALS
TEMPERATURE: 98 F | DIASTOLIC BLOOD PRESSURE: 132 MMHG | OXYGEN SATURATION: 94 % | WEIGHT: 210 LBS | HEIGHT: 63 IN | HEART RATE: 108 BPM | SYSTOLIC BLOOD PRESSURE: 150 MMHG | BODY MASS INDEX: 37.21 KG/M2

## 2018-07-16 DIAGNOSIS — M25.551 RIGHT HIP PAIN: ICD-10-CM

## 2018-07-16 DIAGNOSIS — M25.552 LEFT HIP PAIN: ICD-10-CM

## 2018-07-16 PROCEDURE — 99213 OFFICE O/P EST LOW 20 MIN: CPT | Performed by: PHYSICIAN ASSISTANT

## 2018-07-16 NOTE — LETTER
July 16, 2018         Patient: Karine Ovalle   YOB: 1965   Date of Visit: 7/16/2018           To Whom it May Concern:    Karine Ovalle was seen in my clinic on 7/16/2018. She may return to work on 7/16/2018.    If you have any questions or concerns, please don't hesitate to call.        Sincerely,           Yakov Romero P.A.-C.  Electronically Signed

## 2018-07-16 NOTE — ASSESSMENT & PLAN NOTE
She also complains of worsening left hip pain over the past few weeks. Complains of a burning in the hip. X-ray last year in December showed severe degenerative changes. Right side was the worst. She would like an x-ray of the left hip. Hope is that she will lose weight and the pain will improve. She is currently on phentermine. Weight has fluctuated.

## 2018-07-16 NOTE — TELEPHONE ENCOUNTER
From: Karine Ovalle  To: Juanito Nava M.D.  Sent: 7/13/2018 5:13 PM PDT  Subject: Non-Urgent Medical Question    Dr. CRISTINA M.D.  I will need a revised note for work, as I did feel any better to return to work in regards to my hip pain and swollen legs. I will be able to obtain the revised work excuse on Monday through My Chart.    SINCERELY,  Karine Ovalle

## 2018-07-16 NOTE — ASSESSMENT & PLAN NOTE
This is a 53-year-old female who heard a pop in her right hip approximately 3 days ago. Earlier this year she had a total hip replacement on the right side. States that she was concerned that possibly the joint is out of socket. Denies any significant pain. Is able to ambulate using a cane. She contacted orthopedic office and they told her that likely she was okay. She is on chronic narcotics for pain. She would like confirmation that she does need to worry about her arthroplasty failing.

## 2018-07-16 NOTE — PROGRESS NOTES
Subjective:   Karine Ovalle is a 53 y.o. female here today for acute right hip pain as well as chronic left hip pain with worsening symptoms.    Right hip pain  This is a 53-year-old female who heard a pop in her right hip approximately 3 days ago. Earlier this year she had a total hip replacement on the right side. States that she was concerned that possibly the joint is out of socket. Denies any significant pain. Is able to ambulate using a cane. She contacted orthopedic office and they told her that likely she was okay. She is on chronic narcotics for pain. She would like confirmation that she does need to worry about her arthroplasty failing.    Left hip pain  She also complains of worsening left hip pain over the past few weeks. Complains of a burning in the hip. X-ray last year in December showed severe degenerative changes. Right side was the worst. She would like an x-ray of the left hip. Hope is that she will lose weight and the pain will improve. She is currently on phentermine. Weight has fluctuated.       Current medicines (including changes today)  Current Outpatient Prescriptions   Medication Sig Dispense Refill   • morphine ER (MS CONTIN) 30 MG Tab CR tablet Take 1 Tab by mouth every 12 hours for 30 days. 60 Tab 0   • morphine (MS IR) 30 MG tablet Take 1 Tab by mouth 2 Times a Day for 60 days. 60 Tab 0   • oxyCODONE-acetaminophen (PERCOCET-10)  MG Tab Take 1 Tab by mouth every 8 hours as needed for Severe Pain for up to 30 days. 90 Tab 0   • metaxalone (SKELAXIN) 800 MG Tab      • olopatadine (PATANOL) 0.1 % ophthalmic solution Place 1 Drop in both eyes 2 times a day. 5 mL 11   • phentermine 37.5 MG capsule Take 1 Cap by mouth every morning for 90 days. 90 Cap 0   • furosemide (LASIX) 40 MG Tab Take 1 Tab by mouth every day. 90 Tab 3   • potassium chloride SA (KDUR) 20 MEQ Tab CR Take 1 Tab by mouth every day. 90 Tab 4   • omeprazole (PRILOSEC) 20 MG delayed-release capsule Take 1 Cap by  "mouth every day. 30 Cap 0   • cyanocobalamin (VITAMIN B12) 1000 MCG Tab Take 1 Tab by mouth every day. 90 Tab 4   • multivitamin (THERAGRAN) Tab Take 1 Tab by mouth every day.     • vitamin D (CHOLECALCIFEROL) 1000 UNIT Tab Take 1,000 Units by mouth every day.       No current facility-administered medications for this visit.      She  has a past medical history of Arthritis; Chronic back pain; Dental disorder; Pain (03/07/2018); and Urinary incontinence. She also has no past medical history of ASTHMA; Breast cancer (HCC); or Diabetes.    Social History and Family History were reviewed and updated.    ROS   No chest pain, no shortness of breath, no abdominal pain and all other systems were reviewed and are negative.       Objective:     Blood pressure 150/132, pulse (!) 108, temperature 36.7 °C (98 °F), height 1.6 m (5' 2.99\"), weight 95.3 kg (210 lb), last menstrual period 05/17/2017, SpO2 94 %. Body mass index is 37.21 kg/m².   Physical Exam:  Constitutional: Alert, no distress.  Skin: Warm, dry, good turgor, no rashes in visible areas.  Eye: Equal, round and reactive, conjunctiva clear, lids normal.  ENMT: Lips without lesions, good dentition, oropharynx clear.  Neck: Trachea midline, no masses.   Lymph: No cervical or supraclavicular lymphadenopathy  Respiratory: Unlabored respiratory effort, lungs appear clear, no wheezes.  Cardiovascular: Normal S1, S2, no murmur, no edema.  Musculoskeletal: Unable to perform a good physical exam given her hot body habitats. She is walking with a cane. Gait appears stable.  Psych: Alert and oriented x3, normal affect and mood.        Assessment and Plan:   The following treatment plan was discussed    1. Right hip pain  Chronic condition with recent exacerbation. Advised that her hip does not out of place. The hardware is not failed. Continue PT. Continue use with a cane. Continue pain management medications as needed. May contact Dr. Levine's office with any concerns.    2. " Left hip pain  Chronic condition with recent exacerbation. Symptoms secondary to OA. Ordered x-ray of her left hip for a status. I cleaned she will need to follow up with orthopedist to discuss possible treatment. Continue medications which are mostly narcotics for pain as directed.  - DX-HIP-UNILATERAL-W/O PELVIS-2/3 VIEWS LEFT; Future      Followup: Return if symptoms worsen or fail to improve.    Please note that this dictation was created using voice recognition software. I have made every reasonable attempt to correct obvious errors, but I expect that there are errors of grammar and possibly content that I did not discover before finalizing the note.

## 2018-07-17 ENCOUNTER — APPOINTMENT (OUTPATIENT)
Dept: RADIOLOGY | Facility: MEDICAL CENTER | Age: 53
End: 2018-07-17
Attending: INTERNAL MEDICINE
Payer: COMMERCIAL

## 2018-07-17 NOTE — TELEPHONE ENCOUNTER
Patient called and left a message asking for an updated note for work. She states she was seen in office and was given a note for her to return to work that same day of office visit, but when she was going into work the pain on her hip got worse and wasn't able to go back. She ended up staying home and would need a work note excusing her for the day, as the original note states that it would be up to her if she returns or not.

## 2018-07-19 ENCOUNTER — HOSPITAL ENCOUNTER (OUTPATIENT)
Dept: RADIOLOGY | Facility: MEDICAL CENTER | Age: 53
End: 2018-07-19
Attending: INTERNAL MEDICINE
Payer: COMMERCIAL

## 2018-07-19 ENCOUNTER — APPOINTMENT (OUTPATIENT)
Dept: PHYSICAL THERAPY | Facility: REHABILITATION | Age: 53
End: 2018-07-19
Attending: NURSE PRACTITIONER
Payer: COMMERCIAL

## 2018-07-19 DIAGNOSIS — G89.29 CHRONIC BILATERAL LOW BACK PAIN WITH BILATERAL SCIATICA: ICD-10-CM

## 2018-07-19 DIAGNOSIS — M54.42 CHRONIC BILATERAL LOW BACK PAIN WITH BILATERAL SCIATICA: ICD-10-CM

## 2018-07-19 DIAGNOSIS — M54.41 CHRONIC BILATERAL LOW BACK PAIN WITH BILATERAL SCIATICA: ICD-10-CM

## 2018-07-19 PROCEDURE — 700117 HCHG RX CONTRAST REV CODE 255: Performed by: INTERNAL MEDICINE

## 2018-07-19 PROCEDURE — A9585 GADOBUTROL INJECTION: HCPCS | Performed by: INTERNAL MEDICINE

## 2018-07-19 PROCEDURE — 72158 MRI LUMBAR SPINE W/O & W/DYE: CPT

## 2018-07-19 RX ORDER — GADOBUTROL 604.72 MG/ML
10 INJECTION INTRAVENOUS ONCE
Status: COMPLETED | OUTPATIENT
Start: 2018-07-19 | End: 2018-07-19

## 2018-07-19 RX ADMIN — GADOBUTROL 10 ML: 604.72 INJECTION INTRAVENOUS at 13:22

## 2018-07-23 ENCOUNTER — APPOINTMENT (OUTPATIENT)
Dept: RADIOLOGY | Facility: IMAGING CENTER | Age: 53
End: 2018-07-23
Attending: NURSE PRACTITIONER
Payer: COMMERCIAL

## 2018-07-23 ENCOUNTER — OFFICE VISIT (OUTPATIENT)
Dept: URGENT CARE | Facility: CLINIC | Age: 53
End: 2018-07-23
Payer: COMMERCIAL

## 2018-07-23 VITALS
OXYGEN SATURATION: 98 % | BODY MASS INDEX: 36.32 KG/M2 | HEART RATE: 92 BPM | RESPIRATION RATE: 20 BRPM | HEIGHT: 63 IN | WEIGHT: 205 LBS | TEMPERATURE: 98.8 F | DIASTOLIC BLOOD PRESSURE: 80 MMHG | SYSTOLIC BLOOD PRESSURE: 122 MMHG

## 2018-07-23 DIAGNOSIS — S70.02XA CONTUSION OF LEFT HIP, INITIAL ENCOUNTER: ICD-10-CM

## 2018-07-23 DIAGNOSIS — S76.012A STRAIN OF LEFT HIP, INITIAL ENCOUNTER: ICD-10-CM

## 2018-07-23 DIAGNOSIS — S70.01XA CONTUSION OF RIGHT HIP, INITIAL ENCOUNTER: ICD-10-CM

## 2018-07-23 PROCEDURE — 99214 OFFICE O/P EST MOD 30 MIN: CPT | Performed by: NURSE PRACTITIONER

## 2018-07-23 PROCEDURE — 73501 X-RAY EXAM HIP UNI 1 VIEW: CPT | Mod: TC,FY,LT | Performed by: NURSE PRACTITIONER

## 2018-07-23 RX ORDER — KETOROLAC TROMETHAMINE 10 MG/1
10 TABLET, FILM COATED ORAL EVERY 6 HOURS PRN
Qty: 20 TAB | Refills: 0 | Status: SHIPPED | OUTPATIENT
Start: 2018-07-23 | End: 2018-12-04

## 2018-07-23 RX ORDER — KETOROLAC TROMETHAMINE 30 MG/ML
60 INJECTION, SOLUTION INTRAMUSCULAR; INTRAVENOUS ONCE
Status: COMPLETED | OUTPATIENT
Start: 2018-07-23 | End: 2018-07-23

## 2018-07-23 RX ORDER — METAXALONE 800 MG/1
800 TABLET ORAL 3 TIMES DAILY
Qty: 60 TAB | Refills: 0 | Status: SHIPPED | OUTPATIENT
Start: 2018-07-23 | End: 2018-12-04

## 2018-07-23 RX ADMIN — KETOROLAC TROMETHAMINE 60 MG: 30 INJECTION, SOLUTION INTRAMUSCULAR; INTRAVENOUS at 10:12

## 2018-07-23 ASSESSMENT — ENCOUNTER SYMPTOMS: HIP PAIN: 1

## 2018-07-23 NOTE — PROGRESS NOTES
"Subjective:      Karine Ovalle is a 53 y.o. female who presents with Hip Pain (Fell rtghis motning, hurt left hip .)    Past Medical History:   Diagnosis Date   • Arthritis     osteo/hips and back   • Chronic back pain    • Dental disorder     partial upper   • Pain 03/07/2018    right hip and back, 10/10   • Urinary incontinence      Social History     Social History   • Marital status: Single     Spouse name: N/A   • Number of children: N/A   • Years of education: N/A     Occupational History   • Not on file.     Social History Main Topics   • Smoking status: Never Smoker   • Smokeless tobacco: Never Used   • Alcohol use Yes      Comment: OCC   • Drug use: No   • Sexual activity: No     Other Topics Concern   • Not on file     Social History Narrative    ** Merged History Encounter **          Family History   Problem Relation Age of Onset   • Diabetes Mother    • Heart Disease Mother        Allergies: Zofran [dextrose-ondansetron]    Patient is a 53-year-old female who presents with complaint of pain to the left hip. States earlier this morning she was descending a concrete step outside of her home when she slipped and fell directly on the left hip. She is now having pain to the lateral and medial aspect of the hip in the groin area. She is ambulatory.          Hip Pain   This is a new problem. The current episode started yesterday. The problem occurs constantly. The problem has been unchanged. Nothing aggravates the symptoms. She has tried nothing for the symptoms. The treatment provided no relief.       Review of Systems   Musculoskeletal:        Left hip pain   All other systems reviewed and are negative.         Objective:     /80   Pulse 92   Temp 37.1 °C (98.8 °F)   Resp 20   Ht 1.6 m (5' 3\")   Wt 93 kg (205 lb)   LMP 05/17/2017   SpO2 98%   BMI 36.31 kg/m²      Physical Exam   Constitutional: She is oriented to person, place, and time. She appears well-developed and well-nourished. "   Musculoskeletal:        Legs:  Tender to the medial and lateral hip; no difference in leg length in the lying position. No external rotation of the left leg noted.    Neurological: She is alert and oriented to person, place, and time.   Skin: Skin is warm and dry. Capillary refill takes less than 2 seconds.   Psychiatric: She has a normal mood and affect. Her behavior is normal. Judgment and thought content normal.   Vitals reviewed.    7/23/2018 9:10 AM    HISTORY/REASON FOR EXAM:  Pelvic/Hip Pain Following Trauma.  Left hip pain after ground-level fall    TECHNIQUE/EXAM DESCRIPTION AND NUMBER OF VIEWS:  2 views of the LEFT hip.    COMPARISON: March 20, 2018    FINDINGS:  Patient is status post right total hip replacement. There is no evidence of fracture or dislocation. One of the acetabular screws again extends medial to the acetabulum. There is some heterotopic bone information along the greater trochanter.    There is severe left hip osteoarthritis with obliteration of the joint space. No fracture or dislocation identified. No evidence of acetabular or pubic rami fracture.   Impression       No radiographic evidence of acute traumatic injury.    Severe left hip osteoarthritis.    Status post right hip arthroplasty.                 Assessment/Plan:     1. Contusion of right hip, initial encounter  -Toradol IM  -Skelaxin PO  -ice PRN  -follow up for persistent or worsening of symptoms.

## 2018-07-23 NOTE — LETTER
July 23, 2018       Patient: Karine Ovalle   YOB: 1965   Date of Visit: 7/23/2018         To Whom It May Concern:    It is my medical opinion that Karine Ovalle may return to work on 7/23/18.    If you have any questions or concerns, please don't hesitate to call [phone]          Sincerely,          Cathey J Hamman, A.P.N.  Electronically Signed

## 2018-08-02 ENCOUNTER — APPOINTMENT (OUTPATIENT)
Dept: RADIOLOGY | Facility: IMAGING CENTER | Age: 53
End: 2018-08-02
Attending: NURSE PRACTITIONER
Payer: COMMERCIAL

## 2018-08-02 ENCOUNTER — OFFICE VISIT (OUTPATIENT)
Dept: URGENT CARE | Facility: CLINIC | Age: 53
End: 2018-08-02
Payer: COMMERCIAL

## 2018-08-02 VITALS
SYSTOLIC BLOOD PRESSURE: 132 MMHG | BODY MASS INDEX: 35.97 KG/M2 | TEMPERATURE: 98.1 F | HEIGHT: 63 IN | WEIGHT: 203 LBS | RESPIRATION RATE: 16 BRPM | OXYGEN SATURATION: 100 % | DIASTOLIC BLOOD PRESSURE: 80 MMHG | HEART RATE: 88 BPM

## 2018-08-02 DIAGNOSIS — R20.0 ARM NUMBNESS: ICD-10-CM

## 2018-08-02 DIAGNOSIS — M50.30 DDD (DEGENERATIVE DISC DISEASE), CERVICAL: ICD-10-CM

## 2018-08-02 DIAGNOSIS — M54.2 CERVICALGIA: ICD-10-CM

## 2018-08-02 PROCEDURE — 99214 OFFICE O/P EST MOD 30 MIN: CPT | Performed by: NURSE PRACTITIONER

## 2018-08-02 PROCEDURE — 72040 X-RAY EXAM NECK SPINE 2-3 VW: CPT | Mod: TC,FY | Performed by: NURSE PRACTITIONER

## 2018-08-02 RX ORDER — METHYLPREDNISOLONE 4 MG/1
4 TABLET ORAL DAILY
Qty: 1 KIT | Refills: 0 | Status: SHIPPED | OUTPATIENT
Start: 2018-08-02 | End: 2018-12-04

## 2018-08-02 ASSESSMENT — ENCOUNTER SYMPTOMS
TINGLING: 1
NECK PAIN: 1

## 2018-08-02 NOTE — PROGRESS NOTES
"Subjective:      Karine Ovalle is a 53 y.o. female who presents with Hand Pain (both hands x2wks. Pt. states she has been told it is from her neck)            This is a new problem. Pt has a long, complicated hx of several chronic health problems including OA. She states recently she started to experience numbness and tingling to both her arms and hands. She was recently adjusted by the chiropractor 3 days ago w/o relief. She states it is difficult to control her hands to perform fine motor skills because she feels like she is wearing boxing gloves. She admits this numbness has gradually been getting worse over the last 2 weeks. She has not been taking any medication for this. She denies any recent trauma to her neck.         Review of Systems   Musculoskeletal: Positive for neck pain.   Neurological: Positive for tingling.   All other systems reviewed and are negative.    Past Medical History:   Diagnosis Date   • Arthritis     osteo/hips and back   • Chronic back pain    • Dental disorder     partial upper   • Pain 03/07/2018    right hip and back, 10/10   • Urinary incontinence       Past Surgical History:   Procedure Laterality Date   • HIP ARTHROPLASTY TOTAL Right 3/20/2018    Procedure: HIP ARTHROPLASTY TOTAL;  Surgeon: Bryon Levine M.D.;  Location: SURGERY [facility];  Service: Orthopedics   • KNEE ARTHROPLASTY TOTAL Right 10/20/2015    Procedure: KNEE ARTHROPLASTY TOTAL;  Surgeon: Bryon Levine M.D.;  Location: SURGERY [facility];  Service:    • APPENDECTOMY LAPAROSCOPIC  3/21/2013    Performed by Dali Queen M.D. at [facility]   • DEBRIDEMENT  5/17/2012    Performed by BRADLEY DYKES at SURGERY [facility]   • PLASTIC SURGERY  2004    excess skin on arms and legs removed   • MAMMOPLASTY AUGMENTATION Bilateral 2004    breast implants and \"tummy tuck\"   • GASTRIC BYPASS LAPAROSCOPIC  2002    x 2   • ABDOMINAL EXPLORATION        Social History     Social History   • " "Marital status: Single     Spouse name: N/A   • Number of children: N/A   • Years of education: N/A     Occupational History   • Not on file.     Social History Main Topics   • Smoking status: Never Smoker   • Smokeless tobacco: Never Used   • Alcohol use Yes      Comment: OCC   • Drug use: No   • Sexual activity: No     Other Topics Concern   • Not on file     Social History Narrative    ** Merged History Encounter **               Objective:     /80   Pulse 88   Temp 36.7 °C (98.1 °F)   Resp 16   Ht 1.6 m (5' 3\")   Wt 92.1 kg (203 lb)   LMP 05/17/2017   SpO2 100%   BMI 35.96 kg/m²      Physical Exam   Constitutional: She is oriented to person, place, and time. Vital signs are normal. She appears well-developed and well-nourished.   HENT:   Head: Normocephalic and atraumatic.   Eyes: Pupils are equal, round, and reactive to light. EOM are normal.   Neck: Trachea normal and phonation normal. No spinous process tenderness present. Decreased range of motion (mild in all planes) present.   Cardiovascular: Normal rate and regular rhythm.    Pulmonary/Chest: Effort normal.   Neurological: She is alert and oriented to person, place, and time.   Radicular pain and numbness noted to both UE   Skin: Skin is warm and dry. Capillary refill takes less than 2 seconds.   Psychiatric: She has a normal mood and affect. Her speech is normal and behavior is normal. Thought content normal.   Vitals reviewed.         Xray: cervical DDD by my read. Radiology review pending.       8/2/2018 12:03 PM    HISTORY/REASON FOR EXAM:  Atraumatic Pain.  Neck pain radiating to both upper extremities with numbness in fingertips.    TECHNIQUE/EXAM DESCRIPTION AND NUMBER OF VIEWS:  Cervical spine series, 3 views.    COMPARISON:  CT cervical spine 2/24/2011    FINDINGS:  Artifact from the patient's hair limits exam.  Mild reversal of curvature centered at the C4 level.  Multilevel loss of disc height and osteophyte formation.  Vertebral " body heights are preserved.  The facet joints show normal alignment.  Prevertebral soft tissues are within normal limits.  Odontoid and lateral masses of C1 are intact.  Cervicothoracic junction is intact.   Impression       1.  Moderate to severe multilevel degenerative change of cervical spine with mild reversal of curvature, worse than on prior exam.  2.  No acute fracture or subluxation.       Assessment/Plan:     1. Arm numbness  - DX-CERVICAL SPINE-2 OR 3 VIEWS; Future    2. Cervicalgia  - MethylPREDNISolone (MEDROL DOSEPAK) 4 MG Tablet Therapy Pack; Take 1 Tab by mouth every day.  Dispense: 1 Kit; Refill: 0  - REFERRAL TO SPINE SURGERY    3. DDD (degenerative disc disease), cervical  - REFERRAL TO SPINE SURGERY    Discussed with patient her disc disease is significant and she needs to see a specialist to discuss her options, she understands  Advised her that medication for her symptoms will only help her temporarily and she needs to have something more done to manage the chronicity of this disease  Work note provided  Supportive care, differential diagnoses, and indications for immediate follow-up discussed with patient.    Pathogenesis of diagnosis discussed including typical length and natural progression.      Instructed to return to  or nearest emergency department if symptoms fail to improve, for any change in condition, further concerns, or new concerning symptoms.  Patient states understanding of the plan of care and discharge instructions.

## 2018-08-02 NOTE — LETTER
August 2, 2018         Patient: Karine Ovalle   YOB: 1965   Date of Visit: 8/2/2018           To Whom it May Concern:    Karine Ovalle was seen in my clinic on 8/2/2018. Please excuse her from work today.      Sincerely,           ANNA Zacarias.  Electronically Signed

## 2018-08-06 ENCOUNTER — TELEPHONE (OUTPATIENT)
Dept: MEDICAL GROUP | Age: 53
End: 2018-08-06

## 2018-08-06 NOTE — TELEPHONE ENCOUNTER
Called m for patient to inform of dr. Rubin message. Provider will go over results with patient at upcoming appointment on 8/9/18

## 2018-08-06 NOTE — TELEPHONE ENCOUNTER
----- Message from Micky Rubin M.D. sent at 7/30/2018  4:58 PM PDT -----  Patient has moderate DDD LS spine and moderate disc bulging which we can discuss that follow-up office visit.  Not severe enough to warrant surgical intervention

## 2018-08-09 ENCOUNTER — OFFICE VISIT (OUTPATIENT)
Dept: MEDICAL GROUP | Age: 53
End: 2018-08-09
Payer: COMMERCIAL

## 2018-08-09 VITALS
SYSTOLIC BLOOD PRESSURE: 128 MMHG | WEIGHT: 198 LBS | HEART RATE: 100 BPM | BODY MASS INDEX: 35.08 KG/M2 | TEMPERATURE: 97.9 F | DIASTOLIC BLOOD PRESSURE: 72 MMHG | HEIGHT: 63 IN | OXYGEN SATURATION: 98 %

## 2018-08-09 DIAGNOSIS — F11.20 UNCOMPLICATED OPIOID DEPENDENCE (HCC): ICD-10-CM

## 2018-08-09 DIAGNOSIS — M50.30 DDD (DEGENERATIVE DISC DISEASE), CERVICAL: ICD-10-CM

## 2018-08-09 DIAGNOSIS — M25.552 LEFT HIP PAIN: ICD-10-CM

## 2018-08-09 DIAGNOSIS — M25.551 RIGHT HIP PAIN: ICD-10-CM

## 2018-08-09 DIAGNOSIS — M16.12 PRIMARY OSTEOARTHRITIS OF LEFT HIP: ICD-10-CM

## 2018-08-09 DIAGNOSIS — Z12.11 SCREENING FOR COLORECTAL CANCER: ICD-10-CM

## 2018-08-09 DIAGNOSIS — Z12.12 SCREENING FOR COLORECTAL CANCER: ICD-10-CM

## 2018-08-09 DIAGNOSIS — J45.20 MILD INTERMITTENT ASTHMA WITHOUT COMPLICATION: ICD-10-CM

## 2018-08-09 DIAGNOSIS — G89.29 CHRONIC BILATERAL LOW BACK PAIN WITH BILATERAL SCIATICA: ICD-10-CM

## 2018-08-09 DIAGNOSIS — E66.9 OBESITY (BMI 30-39.9): ICD-10-CM

## 2018-08-09 DIAGNOSIS — M51.36 DDD (DEGENERATIVE DISC DISEASE), LUMBAR: ICD-10-CM

## 2018-08-09 DIAGNOSIS — M54.42 CHRONIC BILATERAL LOW BACK PAIN WITH BILATERAL SCIATICA: ICD-10-CM

## 2018-08-09 DIAGNOSIS — Z12.11 COLON CANCER SCREENING: ICD-10-CM

## 2018-08-09 DIAGNOSIS — M54.41 CHRONIC BILATERAL LOW BACK PAIN WITH BILATERAL SCIATICA: ICD-10-CM

## 2018-08-09 DIAGNOSIS — I10 ESSENTIAL HYPERTENSION: ICD-10-CM

## 2018-08-09 PROBLEM — E87.6 HYPOKALEMIA: Status: RESOLVED | Noted: 2018-06-07 | Resolved: 2018-08-09

## 2018-08-09 PROCEDURE — 99214 OFFICE O/P EST MOD 30 MIN: CPT | Performed by: INTERNAL MEDICINE

## 2018-08-09 ASSESSMENT — ENCOUNTER SYMPTOMS
PSYCHIATRIC NEGATIVE: 1
CARDIOVASCULAR NEGATIVE: 1
NEUROLOGICAL NEGATIVE: 1
BACK PAIN: 1
GASTROINTESTINAL NEGATIVE: 1
CONSTITUTIONAL NEGATIVE: 1
NECK PAIN: 1
RESPIRATORY NEGATIVE: 1
EYES NEGATIVE: 1

## 2018-08-09 NOTE — PROGRESS NOTES
Subjective:      Karine Ovalle is a 53 y.o. female who presents with No chief complaint on file.  Patient is here for follow-up of her chronic left hip pain and back pain and need for updating of her FMLA paperwork.  She was referred to pain management and had an initial visit with them recently and was continued on her Percocet 10/325 3 times daily but the MS Contin-IR was reduced from 30-15 mg twice daily.  The patient was upset that they reduce the dosage wants to come back to me for pain management which I declined.  She feels that the new pain management provider was confused on what medication I had the patient on.  I explained the patient would need to go back and make herself more clear to that provider and see if they will substitute MS Contin prescription back to the 30 mg twice daily, I also explained that I would not be able to do that at this time.  All further pain management issues and prescriptions will be handled by pain management as previously agreed upon.    She needs a work excuse for today as well as updating FMLA paperwork 2 times a week up to 8 hours a day for the next 3 months.    With regard to her right hip pain she has had a good result with no further right hip pain following her total hip replacement a few months ago.  Now she needs a left hip replacement and her left hip pain is quite severe as is her back pain from her DDD LS spine.  In addition she has severe neck pain from DDD of the C-spine confirmed on plain films done last week from urgent care and patient referred to the [facility] [place]..    Patient continues on physical therapy with moderate improvement.  New para          HPI    Review of Systems   Constitutional: Negative.    HENT: Negative.    Eyes: Negative.    Respiratory: Negative.    Cardiovascular: Negative.    Gastrointestinal: Negative.    Genitourinary: Negative.    Musculoskeletal: Positive for back pain, joint pain and neck pain.   Skin: Negative.   "  Neurological: Negative.    Endo/Heme/Allergies: Negative.    Psychiatric/Behavioral: Negative.           Objective:     /72   Pulse 100   Temp 36.6 °C (97.9 °F)   Ht 1.6 m (5' 2.99\")   Wt 89.8 kg (198 lb)   LMP 05/17/2017   SpO2 98%   BMI 35.08 kg/m²      Physical Exam   Constitutional: She is oriented to person, place, and time. She appears well-developed and well-nourished. No distress.   HENT:   Head: Normocephalic and atraumatic.   Right Ear: External ear normal.   Left Ear: External ear normal.   Nose: Nose normal.   Mouth/Throat: Oropharynx is clear and moist. No oropharyngeal exudate.   Eyes: Pupils are equal, round, and reactive to light. Conjunctivae and EOM are normal. Right eye exhibits no discharge. Left eye exhibits no discharge. No scleral icterus.   Neck: Normal range of motion. Neck supple. No JVD present. No tracheal deviation present. No thyromegaly present.   Cardiovascular: Normal rate, regular rhythm, normal heart sounds and intact distal pulses.  Exam reveals no gallop and no friction rub.    No murmur heard.  Pulmonary/Chest: Effort normal and breath sounds normal. No stridor. No respiratory distress. She has no wheezes. She has no rales. She exhibits no tenderness.   Abdominal: Soft. Bowel sounds are normal. She exhibits no distension and no mass. There is no tenderness. There is no rebound and no guarding.   Musculoskeletal: Normal range of motion. She exhibits no edema or tenderness.   Lymphadenopathy:     She has no cervical adenopathy.   Neurological: She is alert and oriented to person, place, and time. She has normal reflexes. She displays normal reflexes. No cranial nerve deficit. She exhibits normal muscle tone. Coordination normal.   Skin: Skin is warm and dry. No rash noted. She is not diaphoretic. No erythema. No pallor.   Psychiatric: She has a normal mood and affect. Her behavior is normal. Judgment and thought content normal.   Vitals reviewed.    No visits with " results within 1 Month(s) from this visit.   Latest known visit with results is:   Hospital Outpatient Visit on 07/06/2018   Component Date Value   • Sodium 07/06/2018 140    • Potassium 07/06/2018 3.7    • Chloride 07/06/2018 104    • Co2 07/06/2018 25    • Glucose 07/06/2018 64*   • Bun 07/06/2018 13    • Creatinine 07/06/2018 0.76    • Calcium 07/06/2018 9.4    • Anion Gap 07/06/2018 11.0    • GFR If  07/06/2018 >60    • GFR If Non  Ameri* 07/06/2018 >60       Lab Results   Component Value Date/Time    HBA1C 5.3 06/12/2017 09:03 AM     Lab Results   Component Value Date/Time    SODIUM 140 07/06/2018 11:41 AM    POTASSIUM 3.7 07/06/2018 11:41 AM    CHLORIDE 104 07/06/2018 11:41 AM    CO2 25 07/06/2018 11:41 AM    GLUCOSE 64 (L) 07/06/2018 11:41 AM    BUN 13 07/06/2018 11:41 AM    CREATININE 0.76 07/06/2018 11:41 AM    CREATININE 0.88 08/14/2010 12:00 AM    BUNCREATRAT 13 08/14/2010 12:00 AM    GLOMRATE >59 08/14/2010 12:00 AM    ALKPHOSPHAT 90 02/22/2018 05:05 PM    ASTSGOT 16 02/22/2018 05:05 PM    ALTSGPT 8 02/22/2018 05:05 PM    TBILIRUBIN 0.4 02/22/2018 05:05 PM     Lab Results   Component Value Date/Time    INR 1.04 05/18/2012 12:20 PM     Lab Results   Component Value Date/Time    CHOLSTRLTOT 168 02/22/2018 05:05 PM    LDL 84 02/22/2018 05:05 PM    HDL 67 02/22/2018 05:05 PM    TRIGLYCERIDE 85 02/22/2018 05:05 PM       No results found for: TESTOSTERONE  Lab Results   Component Value Date/Time    TSH 2.110 08/14/2010 12:00 AM     Lab Results   Component Value Date/Time    FREET4 0.72 05/03/2016 07:32 AM     Lab Results   Component Value Date/Time    URICACID 5.6 08/12/2014 04:49 PM     No components found for: VITB12  Lab Results   Component Value Date/Time    25HYDROXY 33 08/12/2014 04:49 PM    25HYDROXY 25 (L) 07/03/2012 09:20 AM                 Assessment/Plan:     1. Screening for colorectal cancer        2. Chronic bilateral low back pain with bilateral sciatica- pain mgt  This  condition is stable but she still in significant pain according to her.  She does not appear to be in that much severe pain to my exam.  However I will defer to pain management for further narcotics.    3. Uncomplicated opioid dependence (CMS-HCC)-      Per pain management.    4. Colon cancer screening- needs     - COLOGUARD (FIT DNA)    5. Obesity (BMI 30-39.9)    diet/exercise/lose 15 lbs.; patient counseled      6. Essential hypertension  Good control.  Continue same regimen.    7. Right hip pain resolved with hip replacement 3 months ago.  Problem removed from problem list.       8. Left hip pain-needs left total hip replacement.  Follow-up with orthopedic surgeon.    9. DDD (degenerative disc disease), cervical- [facility] [place]  Follow-up with [facility] [place].  I cautioned patient against getting cervical fusion at this point, and to try physical therapy first.    10. Mild intermittent asthma without complication  Good control.  Continue same regimen.    11. Primary osteoarthritis of left hip- dr nowak; needs left THR  As above    12. DDD (degenerative disc disease), lumbar     Under good control.  Continue same regimen with PT.    30 minute face-to-face encounter took place today.  More than half of this time was spent in the coordination of care of the above problems, as well as counseling.

## 2018-08-09 NOTE — LETTER
August 9, 2018        Karine Dominique Vasquez  [address]  [facility] [address]        Dear Karine:       Current Outpatient Prescriptions   Medication Sig Dispense Refill   • metaxalone (SKELAXIN) 800 MG Tab Take 1 Tab by mouth 3 times a day. 60 Tab 0   • ketorolac (TORADOL) 10 MG Tab Take 1 Tab by mouth every 6 hours as needed. 20 Tab 0   • morphine (MS IR) 30 MG tablet Take 1 Tab by mouth 2 Times a Day for 60 days. 60 Tab 0   • olopatadine (PATANOL) 0.1 % ophthalmic solution Place 1 Drop in both eyes 2 times a day. 5 mL 11   • phentermine 37.5 MG capsule Take 1 Cap by mouth every morning for 90 days. 90 Cap 0   • furosemide (LASIX) 40 MG Tab Take 1 Tab by mouth every day. 90 Tab 3   • potassium chloride SA (KDUR) 20 MEQ Tab CR Take 1 Tab by mouth every day. 90 Tab 4   • omeprazole (PRILOSEC) 20 MG delayed-release capsule Take 1 Cap by mouth every day. 30 Cap 0   • cyanocobalamin (VITAMIN B12) 1000 MCG Tab Take 1 Tab by mouth every day. 90 Tab 4   • multivitamin (THERAGRAN) Tab Take 1 Tab by mouth every day.     • vitamin D (CHOLECALCIFEROL) 1000 UNIT Tab Take 1,000 Units by mouth every day.     • MethylPREDNISolone (MEDROL DOSEPAK) 4 MG Tablet Therapy Pack Take 1 Tab by mouth every day. 1 Kit 0   • metaxalone (SKELAXIN) 800 MG Tab        No current facility-administered medications for this visit.          If you have any questions or concerns, please don't hesitate to call.        Sincerely,        Micky Rubin M.D.    Electronically Signed

## 2018-08-09 NOTE — LETTER
August 9, 2018       Patient: Karine Ovalle   YOB: 1965   Date of Visit: 8/9/2018         To Whom It May Concern:    Ms. Ovalle was seen here in the office today 08/09/18    It is my medical opinion that Karine Ovalle return to work on 08/13/18..    If you have any questions or concerns, please don't hesitate to call [phone]          Sincerely,          Micky Rubin M.D.  Electronically Signed

## 2018-08-14 ENCOUNTER — PATIENT MESSAGE (OUTPATIENT)
Dept: MEDICAL GROUP | Age: 53
End: 2018-08-14

## 2018-08-14 ENCOUNTER — HOSPITAL ENCOUNTER (OUTPATIENT)
Dept: RADIOLOGY | Facility: MEDICAL CENTER | Age: 53
End: 2018-08-14
Attending: PHYSICIAN ASSISTANT
Payer: COMMERCIAL

## 2018-08-14 DIAGNOSIS — M54.12 BRACHIAL NEURITIS: ICD-10-CM

## 2018-08-14 PROCEDURE — 72141 MRI NECK SPINE W/O DYE: CPT

## 2018-08-14 PROCEDURE — 72050 X-RAY EXAM NECK SPINE 4/5VWS: CPT

## 2018-08-14 NOTE — TELEPHONE ENCOUNTER
"From: Karine Ovalle  To: Micky Rubin M.D.  Sent: 8/14/2018 12:03 PM PDT  Subject: Non-Urgent Medical Question    Dr. Rubin, (IMPORTANT)!  Please do not send in my physician's certification form to my employer until you let me review it first. It must list the \"[employer]\" of 8 hrs per week. Please review the see the previous certificates of 2017.    Karine   "

## 2018-08-17 ENCOUNTER — APPOINTMENT (OUTPATIENT)
Dept: RADIOLOGY | Facility: MEDICAL CENTER | Age: 53
End: 2018-08-17
Attending: NEUROLOGICAL SURGERY
Payer: COMMERCIAL

## 2018-08-19 ENCOUNTER — HOSPITAL ENCOUNTER (OUTPATIENT)
Dept: RADIOLOGY | Facility: MEDICAL CENTER | Age: 53
End: 2018-08-19
Attending: NEUROLOGICAL SURGERY
Payer: COMMERCIAL

## 2018-08-19 DIAGNOSIS — M48.02 CERVICAL SPINAL STENOSIS: ICD-10-CM

## 2018-08-19 DIAGNOSIS — M47.22 CERVICAL SPONDYLOSIS WITH RADICULOPATHY: ICD-10-CM

## 2018-08-19 PROCEDURE — 72125 CT NECK SPINE W/O DYE: CPT

## 2018-08-27 ENCOUNTER — APPOINTMENT (OUTPATIENT)
Dept: MEDICAL GROUP | Age: 53
End: 2018-08-27
Payer: COMMERCIAL

## 2018-08-29 ENCOUNTER — OFFICE VISIT (OUTPATIENT)
Dept: MEDICAL GROUP | Age: 53
End: 2018-08-29
Payer: COMMERCIAL

## 2018-08-29 VITALS
WEIGHT: 200 LBS | TEMPERATURE: 97.6 F | DIASTOLIC BLOOD PRESSURE: 80 MMHG | BODY MASS INDEX: 35.44 KG/M2 | HEIGHT: 63 IN | HEART RATE: 70 BPM | SYSTOLIC BLOOD PRESSURE: 120 MMHG | OXYGEN SATURATION: 91 %

## 2018-08-29 DIAGNOSIS — I87.2 VENOUS INSUFFICIENCY: ICD-10-CM

## 2018-08-29 DIAGNOSIS — E66.9 OBESITY (BMI 30-39.9): ICD-10-CM

## 2018-08-29 DIAGNOSIS — Z12.11 COLON CANCER SCREENING: ICD-10-CM

## 2018-08-29 DIAGNOSIS — M51.36 DDD (DEGENERATIVE DISC DISEASE), LUMBAR: ICD-10-CM

## 2018-08-29 DIAGNOSIS — M16.12 PRIMARY OSTEOARTHRITIS OF LEFT HIP: ICD-10-CM

## 2018-08-29 DIAGNOSIS — J45.20 MILD INTERMITTENT ASTHMA WITHOUT COMPLICATION: ICD-10-CM

## 2018-08-29 DIAGNOSIS — M50.30 DDD (DEGENERATIVE DISC DISEASE), CERVICAL: ICD-10-CM

## 2018-08-29 DIAGNOSIS — Z00.8 ENCOUNTER FOR WORK CAPABILITY ASSESSMENT: ICD-10-CM

## 2018-08-29 PROBLEM — M25.552 LEFT HIP PAIN: Status: RESOLVED | Noted: 2018-07-16 | Resolved: 2018-08-29

## 2018-08-29 PROCEDURE — 99214 OFFICE O/P EST MOD 30 MIN: CPT | Performed by: INTERNAL MEDICINE

## 2018-08-29 NOTE — LETTER
August 29, 2018         Patient: Karine Ovalle   YOB: 1965   Date of Visit: 8/29/2018           To Whom it May Concern:    Karine Ovalle was seen in my clinic on 8/29/2018. She may return to work on 08/30/18..    If you have any questions or concerns, please don't hesitate to call.        Sincerely,           Micky Rubin M.D.  Electronically Signed

## 2018-08-31 ASSESSMENT — ENCOUNTER SYMPTOMS
NECK PAIN: 1
BACK PAIN: 1
PSYCHIATRIC NEGATIVE: 1
RESPIRATORY NEGATIVE: 1
NEUROLOGICAL NEGATIVE: 1
EYES NEGATIVE: 1
GASTROINTESTINAL NEGATIVE: 1
CARDIOVASCULAR NEGATIVE: 1
CONSTITUTIONAL NEGATIVE: 1

## 2018-08-31 NOTE — PROGRESS NOTES
"Subjective:      Karine Ovalle is a 53 y.o. female who presents with No chief complaint on file.            HPI  Patient is here for follow-up of her long-term partial disability and filling out of [employer] paperwork for employer.  The patient suffers from and is being treated for primary osteoarthritis of the left hip, and degenerative disc disease of the lumbar and cervical spine, as well as obesity, venous insufficiency, and Mild intermittent asthma.    This requires her to be absent from work up to 1 day per week or 8 hours per week.  She can be expected to have flareups up to once a week for the next 3 months at which point we will need to reevaluate her condition and need for further disability accommodations and [employer] paperwork filling up.  Her condition has been present since April 2017.     I am no longer seeing patient for pain management, and have not for the last few months.  She is currently under the care of a pain management doctor elsewhere.      Review of Systems   Constitutional: Negative.    HENT: Negative.    Eyes: Negative.    Respiratory: Negative.    Cardiovascular: Negative.    Gastrointestinal: Negative.    Genitourinary: Negative.    Musculoskeletal: Positive for back pain, joint pain and neck pain.   Skin: Negative.    Neurological: Negative.    Endo/Heme/Allergies: Negative.    Psychiatric/Behavioral: Negative.         Objective:     /80   Pulse 70   Temp 36.4 °C (97.6 °F)   Ht 1.6 m (5' 2.99\")   Wt 90.7 kg (200 lb)   LMP 05/17/2017   SpO2 91%   Breastfeeding? No   BMI 35.44 kg/m²      Physical Exam   Constitutional: She is oriented to person, place, and time. She appears well-developed and well-nourished. No distress.   HENT:   Head: Normocephalic and atraumatic.   Right Ear: External ear normal.   Left Ear: External ear normal.   Nose: Nose normal.   Mouth/Throat: Oropharynx is clear and moist. No oropharyngeal exudate.   Eyes: Pupils are equal, round, and reactive to " light. Conjunctivae and EOM are normal. Right eye exhibits no discharge. Left eye exhibits no discharge. No scleral icterus.   Neck: Normal range of motion. Neck supple. No JVD present. No tracheal deviation present. No thyromegaly present.   Cardiovascular: Normal rate, regular rhythm, normal heart sounds and intact distal pulses.  Exam reveals no gallop and no friction rub.    No murmur heard.  Pulmonary/Chest: Effort normal and breath sounds normal. No stridor. No respiratory distress. She has no wheezes. She has no rales. She exhibits no tenderness.   Abdominal: Soft. Bowel sounds are normal. She exhibits no distension and no mass. There is no tenderness. There is no rebound and no guarding.   Musculoskeletal: Normal range of motion. She exhibits no edema or tenderness.   Lymphadenopathy:     She has no cervical adenopathy.   Neurological: She is alert and oriented to person, place, and time. She has normal reflexes. She displays normal reflexes. No cranial nerve deficit. She exhibits normal muscle tone. Coordination normal.   Skin: Skin is warm and dry. No rash noted. She is not diaphoretic. No erythema. No pallor.   Psychiatric: She has a normal mood and affect. Her behavior is normal. Judgment and thought content normal.   Vitals reviewed.    No visits with results within 1 Month(s) from this visit.   Latest known visit with results is:   Hospital Outpatient Visit on 07/06/2018   Component Date Value   • Sodium 07/06/2018 140    • Potassium 07/06/2018 3.7    • Chloride 07/06/2018 104    • Co2 07/06/2018 25    • Glucose 07/06/2018 64*   • Bun 07/06/2018 13    • Creatinine 07/06/2018 0.76    • Calcium 07/06/2018 9.4    • Anion Gap 07/06/2018 11.0    • GFR If  07/06/2018 >60    • GFR If Non  Ameri* 07/06/2018 >60       Lab Results   Component Value Date/Time    HBA1C 5.3 06/12/2017 09:03 AM     Lab Results   Component Value Date/Time    SODIUM 140 07/06/2018 11:41 AM    POTASSIUM 3.7  07/06/2018 11:41 AM    CHLORIDE 104 07/06/2018 11:41 AM    CO2 25 07/06/2018 11:41 AM    GLUCOSE 64 (L) 07/06/2018 11:41 AM    BUN 13 07/06/2018 11:41 AM    CREATININE 0.76 07/06/2018 11:41 AM    CREATININE 0.88 08/14/2010 12:00 AM    BUNCREATRAT 13 08/14/2010 12:00 AM    GLOMRATE >59 08/14/2010 12:00 AM    ALKPHOSPHAT 90 02/22/2018 05:05 PM    ASTSGOT 16 02/22/2018 05:05 PM    ALTSGPT 8 02/22/2018 05:05 PM    TBILIRUBIN 0.4 02/22/2018 05:05 PM     Lab Results   Component Value Date/Time    INR 1.04 05/18/2012 12:20 PM     Lab Results   Component Value Date/Time    CHOLSTRLTOT 168 02/22/2018 05:05 PM    LDL 84 02/22/2018 05:05 PM    HDL 67 02/22/2018 05:05 PM    TRIGLYCERIDE 85 02/22/2018 05:05 PM       No results found for: TESTOSTERONE  Lab Results   Component Value Date/Time    TSH 2.110 08/14/2010 12:00 AM     Lab Results   Component Value Date/Time    FREET4 0.72 05/03/2016 07:32 AM     Lab Results   Component Value Date/Time    URICACID 5.6 08/12/2014 04:49 PM     No components found for: VITB12  Lab Results   Component Value Date/Time    25HYDROXY 33 08/12/2014 04:49 PM    25HYDROXY 25 (L) 07/03/2012 09:20 AM                 Assessment/Plan:   The patient suffers from  1. Encounter for work capability assessment- FMLA PAPERWORK   done- see media - scanned    2. DDD (degenerative disc disease), lumbar   Under good control. Continue same regimen.      3. DDD (degenerative disc disease), cervical- [facility] [place]      Under good control. Continue same regimen.    4. Primary osteoarthritis of left hip- dr nowak; needs left THR    Under good control. Continue same regimen.    5. Obesity (BMI 30-39.9)    diet/exercise/lose 15 lbs.; patient counseled      6. Venous insufficiency    Under good control. Continue same regimen.      7. Colon cancer screening- needs   ordered    8. Mild intermittent asthma without complication       Under good control. Continue same regimen.        40 minute face-to-face encounter  took place today.  More than half of this time was spent in the coordination of care of the above problems, as well as counseling.

## 2018-09-05 ENCOUNTER — OFFICE VISIT (OUTPATIENT)
Dept: URGENT CARE | Facility: CLINIC | Age: 53
End: 2018-09-05
Payer: COMMERCIAL

## 2018-09-05 VITALS
HEIGHT: 63 IN | SYSTOLIC BLOOD PRESSURE: 132 MMHG | DIASTOLIC BLOOD PRESSURE: 90 MMHG | OXYGEN SATURATION: 100 % | TEMPERATURE: 99.2 F | BODY MASS INDEX: 35.33 KG/M2 | WEIGHT: 199.4 LBS | RESPIRATION RATE: 18 BRPM | HEART RATE: 104 BPM

## 2018-09-05 DIAGNOSIS — M25.552 LEFT HIP PAIN: ICD-10-CM

## 2018-09-05 PROCEDURE — 99214 OFFICE O/P EST MOD 30 MIN: CPT | Performed by: NURSE PRACTITIONER

## 2018-09-05 RX ORDER — KETOROLAC TROMETHAMINE 30 MG/ML
30 INJECTION, SOLUTION INTRAMUSCULAR; INTRAVENOUS ONCE
Status: COMPLETED | OUTPATIENT
Start: 2018-09-05 | End: 2018-09-05

## 2018-09-05 RX ADMIN — KETOROLAC TROMETHAMINE 30 MG: 30 INJECTION, SOLUTION INTRAMUSCULAR; INTRAVENOUS at 16:00

## 2018-09-05 ASSESSMENT — ENCOUNTER SYMPTOMS
HIP PAIN: 1
FEVER: 0
CHILLS: 0

## 2018-09-05 NOTE — LETTER
September 5, 2018        Karine Fox Vasquez  [address]  [place] [address]        To whom it may concern,       Karine was seen at our office today 9/5/18, please excuse her from work due to illness and may return to work tomorrow 9/6/18.       If you have any questions or concerns, please don't hesitate to call.        Sincerely,        Cathey J Hamman, A.P.N.    Electronically Signed

## 2018-09-05 NOTE — PROGRESS NOTES
"Subjective:      Karine Ovalle is a 53 y.o. female who presents with Hip Pain (can not get appointment with pain management for about 3 weeks )    Past Medical History:   Diagnosis Date   • Arthritis     osteo/hips and back   • Chronic back pain    • Dental disorder     partial upper   • Pain 03/07/2018    right hip and back, 10/10   • Urinary incontinence      Social History     Social History   • Marital status: Single     Spouse name: N/A   • Number of children: N/A   • Years of education: N/A     Occupational History   • Not on file.     Social History Main Topics   • Smoking status: Never Smoker   • Smokeless tobacco: Never Used   • Alcohol use Yes      Comment: OCC   • Drug use: No   • Sexual activity: No     Other Topics Concern   • Not on file     Social History Narrative    ** Merged History Encounter **          Family History   Problem Relation Age of Onset   • Diabetes Mother    • Heart Disease Mother        Allergies: Zofran [[email or web address]-[email or web address]]    Patient presents with complaint of chronic left hip pain. She is on pain management and is not due to be seen until 9/7.  States her pain is uncontrolled at this time. Has taken toradol previously with relief.           Hip Pain   This is a chronic problem. The problem occurs constantly. The problem has been unchanged. Pertinent negatives include no chills or fever. Nothing aggravates the symptoms. She has tried nothing for the symptoms. The treatment provided no relief.       Review of Systems   Constitutional: Negative for chills and fever.   Musculoskeletal:        Left hip pain   All other systems reviewed and are negative.         Objective:     Ht 1.6 m (5' 3\")   Wt 90.4 kg (199 lb 6.4 oz)   LMP 05/17/2017   Breastfeeding? No   BMI 35.32 kg/m²      Physical Exam   Constitutional: She is oriented to person, place, and time. She appears well-developed and well-nourished.   Musculoskeletal:        Legs:  Neurological: She is alert and oriented " to person, place, and time.   Skin: Skin is warm and dry. Capillary refill takes less than 2 seconds.   Psychiatric: She has a normal mood and affect. Her behavior is normal. Judgment and thought content normal.   Vitals reviewed.              Assessment/Plan:     1. Left hip pain  -Toradol IM  -rest  -ice PRN  -keep follow up appointment with pain management physician  -ER precautions for worsening of symtpoms.

## 2018-09-05 NOTE — LETTER
September 5, 2018        Karine Fox Vasquez  [address]  [place] [address]      To whom it may concern,         Karine was seen at our office today, please excuse her from work due to illness.         If you have any questions or concerns, please don't hesitate to call.        Sincerely,        Cathey J Hamman, A.P.N.    Electronically Signed

## 2018-09-11 ENCOUNTER — PATIENT MESSAGE (OUTPATIENT)
Dept: MEDICAL GROUP | Age: 53
End: 2018-09-11

## 2018-09-12 ENCOUNTER — APPOINTMENT (OUTPATIENT)
Dept: RADIOLOGY | Facility: MEDICAL CENTER | Age: 53
End: 2018-09-12
Attending: EMERGENCY MEDICINE
Payer: COMMERCIAL

## 2018-09-12 ENCOUNTER — HOSPITAL ENCOUNTER (EMERGENCY)
Facility: MEDICAL CENTER | Age: 53
End: 2018-09-12
Attending: EMERGENCY MEDICINE
Payer: COMMERCIAL

## 2018-09-12 VITALS
WEIGHT: 210.54 LBS | HEART RATE: 86 BPM | DIASTOLIC BLOOD PRESSURE: 77 MMHG | SYSTOLIC BLOOD PRESSURE: 140 MMHG | TEMPERATURE: 97 F | BODY MASS INDEX: 37.3 KG/M2 | OXYGEN SATURATION: 95 % | HEIGHT: 63 IN | RESPIRATION RATE: 18 BRPM

## 2018-09-12 DIAGNOSIS — M25.552 LEFT HIP PAIN: ICD-10-CM

## 2018-09-12 PROCEDURE — 73700 CT LOWER EXTREMITY W/O DYE: CPT | Mod: LT

## 2018-09-12 PROCEDURE — 73502 X-RAY EXAM HIP UNI 2-3 VIEWS: CPT | Mod: LT

## 2018-09-12 PROCEDURE — 99284 EMERGENCY DEPT VISIT MOD MDM: CPT

## 2018-09-12 PROCEDURE — 96372 THER/PROPH/DIAG INJ SC/IM: CPT

## 2018-09-12 PROCEDURE — 700111 HCHG RX REV CODE 636 W/ 250 OVERRIDE (IP): Performed by: EMERGENCY MEDICINE

## 2018-09-12 RX ORDER — KETOROLAC TROMETHAMINE 30 MG/ML
30 INJECTION, SOLUTION INTRAMUSCULAR; INTRAVENOUS ONCE
Status: COMPLETED | OUTPATIENT
Start: 2018-09-12 | End: 2018-09-12

## 2018-09-12 RX ADMIN — KETOROLAC TROMETHAMINE 30 MG: 30 INJECTION, SOLUTION INTRAMUSCULAR at 01:30

## 2018-09-12 NOTE — DISCHARGE INSTRUCTIONS
Hip Pain  Your hip is the joint between your upper legs and your lower pelvis. The bones, cartilage, tendons, and muscles of your hip joint perform a lot of work each day supporting your body weight and allowing you to move around.  Hip pain can range from a minor ache to severe pain in one or both of your hips. Pain may be felt on the inside of the hip joint near the groin, or the outside near the buttocks and upper thigh. You may have swelling or stiffness as well.  Follow these instructions at home:  · Take medicines only as directed by your health care provider.  · Apply ice to the injured area:  ¨ Put ice in a plastic bag.  ¨ Place a towel between your skin and the bag.  ¨ Leave the ice on for 15-20 minutes at a time, 3-4 times a day.  · Keep your leg raised (elevated) when possible to lessen swelling.  · Avoid activities that cause pain.  · Follow specific exercises as directed by your health care provider.  · Sleep with a pillow between your legs on your most comfortable side.  · Record how often you have hip pain, the location of the pain, and what it feels like.  Contact a health care provider if:  · You are unable to put weight on your leg.  · Your hip is red or swollen or very tender to touch.  · Your pain or swelling continues or worsens after 1 week.  · You have increasing difficulty walking.  · You have a fever.  Get help right away if:  · You have fallen.  · You have a sudden increase in pain and swelling in your hip.  This information is not intended to replace advice given to you by your health care provider. Make sure you discuss any questions you have with your health care provider.  Document Released: 06/07/2011 Document Revised: 05/25/2017 Document Reviewed: 08/14/2014  [employer] Interactive Patient Education © 2017 [employer]

## 2018-09-12 NOTE — ED PROVIDER NOTES
CHIEF COMPLAINT  Chief Complaint   Patient presents with   • Hip Pain       HPI  aKrine Ovalle is a 53 y.o. female who presents for evaluation of left hip pain after a ground-level fall.  Patient apparently fell getting out of the shower and could not get back up.  She has had the right hip replaced in March after a ground-level fall as well.  Patient did not hit her head and has no head or back pain    REVIEW OF SYSTEMS  Constitutional: No fevers or chills  Skin: No rashes, abrasions, or lacerations  HEENT: No sore throat, runny nose, sores, trouble swallowing, trouble speaking.  Neck: No neck pain, stiffness, or masses.  Chest: No pain or rashes  Pulm: No shortness of breath, cough, wheezing, stridor, or pain with inspiration/expiration  Gastrointestinal: No nausea, vomiting, diarrhea  Genitourinary: No dysuria or hematuria  Musculoskeletal: Left hip pain, chronic right hip pain  Neurologic: No sensory or motor changes. No confusion or disorientation.  Heme: No bleeding or bruising problems.   Immuno: No hx of recurrent infections      PAST MEDICAL HISTORY   has a past medical history of Arthritis; Chronic back pain; Dental disorder; Pain (03/07/2018); and Urinary incontinence.    SOCIAL HISTORY  Social History     Social History Main Topics   • Smoking status: Never Smoker   • Smokeless tobacco: Never Used   • Alcohol use Yes      Comment: OCC   • Drug use: No   • Sexual activity: No       SURGICAL HISTORY   has a past surgical history that includes gastric bypass laparoscopic (2002); abdominal exploration; plastic surgery (2004); debridement (5/17/2012); appendectomy laparoscopic (3/21/2013); knee arthroplasty total (Right, 10/20/2015); mammoplasty augmentation (Bilateral, 2004); and hip arthroplasty total (Right, 3/20/2018).    CURRENT MEDICATIONS  Home Medications     Reviewed by Gaudencio Shin R.N. (Registered Nurse) on 09/12/18 at 0007  Med List Status: Partial   Medication Last Dose Status  "  cyanocobalamin (VITAMIN B12) 1000 MCG Tab 8/9/2018 Active   furosemide (LASIX) 40 MG Tab 8/9/2018 Active   ketorolac (TORADOL) 10 MG Tab 8/9/2018 Active   metaxalone (SKELAXIN) 800 MG Tab 7/23/2018 Active   metaxalone (SKELAXIN) 800 MG Tab 8/8/2018 Active   MethylPREDNISolone (MEDROL DOSEPAK) 4 MG Tablet Therapy Pack  Active   multivitamin (THERAGRAN) Tab 8/9/2018 Active   olopatadine (PATANOL) 0.1 % ophthalmic solution 8/9/2018 Active   omeprazole (PRILOSEC) 20 MG delayed-release capsule 8/9/2018 Active   potassium chloride SA (KDUR) 20 MEQ Tab CR 8/9/2018 Active   vitamin D (CHOLECALCIFEROL) 1000 UNIT Tab 8/9/2018 Active                ALLERGIES  Allergies   Allergen Reactions   • Zofran [Dextrose-Ondansetron] Nausea       PHYSICAL EXAM  VITAL SIGNS: /77   Pulse 86   Temp 36.1 °C (97 °F)   Resp 18   Ht 1.6 m (5' 3\")   Wt 95.5 kg (210 lb 8.6 oz)   LMP 05/17/2017   SpO2 95%   BMI 37.30 kg/m²    Gen: Appears groggy but otherwise in no apparent distress  HEENT: No signs of trauma, Bilateral external ears normal, Nose normal. Conjunctiva normal, Non-icteric.   Neck:  No tenderness, Supple, No masses  Lymphatic: No cervical lymphadenopathy noted.   Cardiovascular: Regular rate and rhythm, no murmurs. 2+ distal pulses to dorsalis pedis and radial arteries bilaterally. Skin warm and dry to extremity. Less than 3 2nd capillary refill to all extremities.  Thorax & Lungs: Normal breath sounds, No respiratory distress, No wheezing bilateral chest rise  Abdomen: Bowel sounds normal, Soft, No tenderness, No masses, No pulsatile masses. No Guarding or rebound  Skin: Warm, Dry, No erythema, No rash.   Back: No bony tenderness, No CVA tenderness.   Extremities: Intact distal pulses, No edema, mild right inguinal tenderness, range of motion grossly normal all with the exception of left lower extremity in which hip flexion and knee flexion is limited due to pain in the inguinal area. No deformities noted. "   Neurologic: Alert , no facial droop, grossly normal coordination and strength  Psychiatric: Affect normal, Judgment normal, Mood normal.       INITIAL IMPRESSION  Patient has exam findings and history suggestive of a left hip fracture. Imaging will be obtained and pain will be treated. Patient has chronic lymphedema of both lower extremities and chronic right hip pain from a previous fracture. There are no obvious deformities or neurovascular compromise to her lower extremities. She does not appear to have any back pain, chest pain, or head pain. There appear to be no precipitating factors for the fall which could relate to medical emergencies however she is chronically debilitated.    LABS  Results for orders placed or performed during the hospital encounter of 07/06/18   BASIC METABOLIC PANEL   Result Value Ref Range    Sodium 140 135 - 145 mmol/L    Potassium 3.7 3.6 - 5.5 mmol/L    Chloride 104 96 - 112 mmol/L    Co2 25 20 - 33 mmol/L    Glucose 64 (L) 65 - 99 mg/dL    Bun 13 8 - 22 mg/dL    Creatinine 0.76 0.50 - 1.40 mg/dL    Calcium 9.4 8.5 - 10.5 mg/dL    Anion Gap 11.0 0.0 - 11.9   ESTIMATED GFR   Result Value Ref Range    GFR If African American >60 >60 mL/min/1.73 m 2    GFR If Non African American >60 >60 mL/min/1.73 m 2       RADIOLOGY  CT-HIP W/O PLUS RECONS LEFT   Final Result      Severe left hip osteoarthritis.      No acute fracture or dislocation is identified. If pain persists, further evaluation can be obtained with MRI.      Postsurgical changes status post right hip arthroplasty with streak artifact limiting evaluation.      Degenerative changes in the visualized lower lumbar spine.         DX-HIP-COMPLETE - UNILATERAL 2+ LEFT   Final Result      No fracture or dislocation is seen.      Severe left hip osteoarthritis.      Status post right hip arthroplasty.      Degenerative changes in the visualized lower lumbar spine and sacroiliac joints.      Moderate amount of colonic stool.            Reevaluation   Time: 5 AM  Vital signs: Reviewed per nursing note, appears stable  Assessment: Patient appears tired but otherwise nondistressed. He states understanding the findings and agrees with the plan for discharge. Patient notes she has chronic difficulty with the left hip due to pain and will follow-up with her primary care physician as she will likely need elective replacement.  COURSE & MEDICAL DECISION MAKING  Pertinent Labs & Imaging studies reviewed. (See chart for details)  Patient arrives for evaluation after ground-level fall and left hip pain with no findings to suggest an acute fracture or any other emergent injury. She has no outward signs of injury to the left hip and I did not feel MRI was necessary emergently given the findings. The patient is somewhat chronically debilitated and has difficulty ambulating at baseline however do not feel inpatient evaluation is necessary at this point. Patient stated clearly understanding she did follow closely with her primary care provider for further evaluation if symptoms persist or return if they worsen or change.     FINAL IMPRESSION  1. Left hip pain        Electronically signed by: Jose Childers, 9/12/2018 12:46 AM

## 2018-09-12 NOTE — ED NOTES
Rounded on patient,  Back from restroom.  Medication administered.  No additional needs at this time.

## 2018-09-12 NOTE — ED NOTES
With assistance by two staff members, patient was able to stand and ambulate 5 feet.  Very painful, not able to ambulate independently.

## 2018-09-12 NOTE — ED NOTES
Discharge information reviewed in detail. Patient verbalized understanding of discharge instructions to follow up with Caryn and to return to ER if condition worsens.  Called a friend to drive home.   Patient wheeled out of ER.

## 2018-09-12 NOTE — ED NOTES
Pt bib [facility] following a GLF earlier today. Pt was showering when she slipped and landed on her left hip.

## 2018-09-14 ENCOUNTER — OFFICE VISIT (OUTPATIENT)
Dept: URGENT CARE | Facility: CLINIC | Age: 53
End: 2018-09-14
Payer: COMMERCIAL

## 2018-09-14 VITALS
SYSTOLIC BLOOD PRESSURE: 152 MMHG | DIASTOLIC BLOOD PRESSURE: 82 MMHG | OXYGEN SATURATION: 96 % | WEIGHT: 199 LBS | HEART RATE: 114 BPM | HEIGHT: 63 IN | RESPIRATION RATE: 16 BRPM | TEMPERATURE: 98.1 F | BODY MASS INDEX: 35.26 KG/M2

## 2018-09-14 DIAGNOSIS — M25.552 LEFT HIP PAIN: ICD-10-CM

## 2018-09-14 DIAGNOSIS — S70.02XA CONTUSION OF LEFT HIP, INITIAL ENCOUNTER: ICD-10-CM

## 2018-09-14 PROCEDURE — 99214 OFFICE O/P EST MOD 30 MIN: CPT | Mod: 25 | Performed by: PHYSICIAN ASSISTANT

## 2018-09-14 RX ORDER — KETOROLAC TROMETHAMINE 30 MG/ML
30 INJECTION, SOLUTION INTRAMUSCULAR; INTRAVENOUS ONCE
Status: COMPLETED | OUTPATIENT
Start: 2018-09-14 | End: 2018-09-14

## 2018-09-14 RX ADMIN — KETOROLAC TROMETHAMINE 30 MG: 30 INJECTION, SOLUTION INTRAMUSCULAR; INTRAVENOUS at 14:57

## 2018-09-14 ASSESSMENT — ENCOUNTER SYMPTOMS
NAUSEA: 0
SHORTNESS OF BREATH: 0
MYALGIAS: 0
COUGH: 0
FEVER: 0
TINGLING: 0
VOMITING: 0
SENSORY CHANGE: 0
CHILLS: 0
FOCAL WEAKNESS: 0
PALPITATIONS: 0

## 2018-09-14 NOTE — LETTER
September 14, 2018         Patient: Karine Ovalle   YOB: 1965   Date of Visit: 9/14/2018           To Whom it May Concern:    Karine Ovalle was seen in my clinic on 9/14/2018. She is excused from work 9/12/18-9/13/18 for medical reasons.    If you have any questions or concerns, please don't hesitate to call.        Sincerely,           Mindy Gagnon P.A.-C.  Electronically Signed

## 2018-09-14 NOTE — LETTER
September 14, 2018         Patient: Karine Ovalle   YOB: 1965   Date of Visit: 9/14/2018           To Whom it May Concern:    Karine Ovalle was seen in my clinic on 9/14/2018. She is excused from work today, 9/14/18.    If you have any questions or concerns, please don't hesitate to call.        Sincerely,           Mindy Gagnon P.A.-C.  Electronically Signed

## 2018-09-14 NOTE — PROGRESS NOTES
"Subjective:      Karine Ovalle is a 53 y.o. female who presents with Hip Pain (Pt. states she fell in the shower on 09/11 and now her left hip is in more pain.)            Patient is here with complaints of continued left hip s/p fall 9/12/18. Patient was seen in  ER where X-ray and CT of the left hip were performed. Imaging came back negative for acute fractures. Patient has history of severe osteoarthritis in both hips. She has had her right hip replaced and is waiting to  Get her left hip replaced. She has an appointment with her Orthopedic surgeon on 9/20/18. She is here requesting a work note and a Toradol shot. Patient takes chronic pain medication including Morphine and Oxycodone through pain specialist. She is also requesting a referral to somewhere where she can get shoe inserts to help her walk. She states her hips are uneven due to her hip replacement.      Past Medical History:   Diagnosis Date   • Arthritis     osteo/hips and back   • Chronic back pain    • Dental disorder     partial upper   • Pain 03/07/2018    right hip and back, 10/10   • Urinary incontinence        Past Surgical History:   Procedure Laterality Date   • HIP ARTHROPLASTY TOTAL Right 3/20/2018    Procedure: HIP ARTHROPLASTY TOTAL;  Surgeon: Bryon Levine M.D.;  Location: SURGERY [facility];  Service: Orthopedics   • KNEE ARTHROPLASTY TOTAL Right 10/20/2015    Procedure: KNEE ARTHROPLASTY TOTAL;  Surgeon: Bryon Levine M.D.;  Location: SURGERY [facility];  Service:    • APPENDECTOMY LAPAROSCOPIC  3/21/2013    Performed by Dali Queen M.D. at [facility]   • DEBRIDEMENT  5/17/2012    Performed by BRADLEY DYKES at SURGERY [facility]   • PLASTIC SURGERY  2004    excess skin on arms and legs removed   • MAMMOPLASTY AUGMENTATION Bilateral 2004    breast implants and \"tummy tuck\"   • GASTRIC BYPASS LAPAROSCOPIC  2002    x 2   • ABDOMINAL EXPLORATION         Family History   Problem Relation Age " "of Onset   • Diabetes Mother    • Heart Disease Mother        Allergies   Allergen Reactions   • Zofran [Dextrose-Ondansetron] Nausea         Medications, Allergies, and current problem list reviewed today in Epic      Review of Systems   Constitutional: Negative for chills, fever and malaise/fatigue.   Respiratory: Negative for cough and shortness of breath.    Cardiovascular: Negative for chest pain, palpitations and leg swelling.   Gastrointestinal: Negative for nausea and vomiting.   Musculoskeletal: Positive for joint pain (left hip pain ). Negative for myalgias.   Skin: Negative for rash.   Neurological: Negative for tingling, sensory change and focal weakness.     All other systems reviewed and are negative.        Objective:     /82   Pulse (!) 114   Temp 36.7 °C (98.1 °F)   Resp 16   Ht 1.6 m (5' 3\")   Wt 90.3 kg (199 lb)   LMP 05/17/2017   SpO2 96%   Breastfeeding? No   BMI 35.25 kg/m²      Physical Exam   Constitutional: She is oriented to person, place, and time. She appears well-developed and well-nourished. No distress.   HENT:   Head: Normocephalic and atraumatic.   Cardiovascular: Normal rate, regular rhythm and normal heart sounds.  Exam reveals no gallop and no friction rub.    No murmur heard.  Pulmonary/Chest: Effort normal and breath sounds normal. No respiratory distress. She has no wheezes. She has no rales.   Musculoskeletal:        Left hip: She exhibits decreased range of motion, tenderness (marked TTP lateral aspect of left hip) and bony tenderness. She exhibits normal strength and no swelling.   Antalgic gait. Left foot distal n/v intact.   Neurological: She is alert and oriented to person, place, and time. No cranial nerve deficit.   Psychiatric: She has a normal mood and affect. Her behavior is normal. Judgment and thought content normal.               Assessment/Plan:     1. Left hip pain  ketorolac (TORADOL) injection 30 mg    DME OTHER   2. Contusion of left hip, " initial encounter  DME OTHER           Current Facility-Administered Medications:   •  ketorolac (TORADOL) injection 30 mg, 30 mg, Intramuscular, Once, Mindy Gagnon P.A.-C.    - continue pain meds  - follow-up with Orthopedic surgeon on 9/20/18.  - work notes given.  - RX DME to [facility].      Differential diagnoses, Supportive care, and indications for immediate follow-up discussed with patient.   Instructed to return to clinic or nearest emergency department for any change in condition, further concerns, or worsening of symptoms.    The patient demonstrated a good understanding and agreed with the treatment plan.    Mindy Gagnon P.A.-C.

## 2018-09-14 NOTE — LETTER
September 14, 2018         Patient: Karine Ovalle   YOB: 1965   Date of Visit: 9/14/2018           To Whom it May Concern:    Karine Ovalle was seen in my clinic on 9/14/2018. She is excused from work today, 9/14/18. She may return to work on Monday 9/17/18. If you have any questions or concerns, please don't hesitate to call.        Sincerely,           Mindy Gagnon P.A.-C.  Electronically Signed

## 2018-09-18 ENCOUNTER — OFFICE VISIT (OUTPATIENT)
Dept: MEDICAL GROUP | Facility: MEDICAL CENTER | Age: 53
End: 2018-09-18
Payer: COMMERCIAL

## 2018-09-18 VITALS
TEMPERATURE: 98.1 F | RESPIRATION RATE: 20 BRPM | OXYGEN SATURATION: 95 % | BODY MASS INDEX: 35.26 KG/M2 | WEIGHT: 199 LBS | HEIGHT: 63 IN | DIASTOLIC BLOOD PRESSURE: 74 MMHG | SYSTOLIC BLOOD PRESSURE: 126 MMHG | HEART RATE: 114 BPM

## 2018-09-18 DIAGNOSIS — M25.552 LEFT HIP PAIN: ICD-10-CM

## 2018-09-18 PROCEDURE — 99213 OFFICE O/P EST LOW 20 MIN: CPT | Performed by: FAMILY MEDICINE

## 2018-09-18 NOTE — ASSESSMENT & PLAN NOTE
Patient slipped and fell on her left hip on 9/12/18.  She was seen in the ER and had a CT of the hip that showed no fractures.  She is scheduled to see orthopedics tomorrow.  She was seen in the Urgent Care on 9/14/18.  She is concerned because she now has a bruise on her lateral upper leg.  She is on Morphine SR 30 mg BID and Oxycodone three times a day from her pain management provider.

## 2018-09-18 NOTE — LETTER
September 18, 2018         Patient: Karine Ovalle   YOB: 1965   Date of Visit: 9/18/2018           To Whom it May Concern:    Karine Ovalle was seen in my clinic on 9/18/2018. She may return to work on 9/19/18..    If you have any questions or concerns, please don't hesitate to call.        Sincerely,           Crystal Fontenot M.D.  Electronically Signed

## 2018-09-21 NOTE — PROGRESS NOTES
Subjective:     Chief Complaint   Patient presents with   • Fall       Karine Ovalle is a 53 y.o. female here today for evaluation and management of:    Left hip pain  Patient slipped and fell on her left hip on 9/12/18.  She was seen in the ER and had a CT of the hip that showed no fractures.  She is scheduled to see orthopedics tomorrow.  She was seen in the Urgent Care on 9/14/18.  She is concerned because she now has a bruise on her lateral upper leg.  She is on Morphine SR 30 mg BID and Oxycodone three times a day from her pain management provider.       Allergies   Allergen Reactions   • Zofran [Dextrose-Ondansetron] Nausea       Current medicines (including changes today)  Current Outpatient Prescriptions   Medication Sig Dispense Refill   • MethylPREDNISolone (MEDROL DOSEPAK) 4 MG Tablet Therapy Pack Take 1 Tab by mouth every day. 1 Kit 0   • metaxalone (SKELAXIN) 800 MG Tab Take 1 Tab by mouth 3 times a day. 60 Tab 0   • ketorolac (TORADOL) 10 MG Tab Take 1 Tab by mouth every 6 hours as needed. 20 Tab 0   • metaxalone (SKELAXIN) 800 MG Tab      • olopatadine (PATANOL) 0.1 % ophthalmic solution Place 1 Drop in both eyes 2 times a day. 5 mL 11   • furosemide (LASIX) 40 MG Tab Take 1 Tab by mouth every day. 90 Tab 3   • potassium chloride SA (KDUR) 20 MEQ Tab CR Take 1 Tab by mouth every day. 90 Tab 4   • omeprazole (PRILOSEC) 20 MG delayed-release capsule Take 1 Cap by mouth every day. 30 Cap 0   • cyanocobalamin (VITAMIN B12) 1000 MCG Tab Take 1 Tab by mouth every day. 90 Tab 4   • multivitamin (THERAGRAN) Tab Take 1 Tab by mouth every day.     • vitamin D (CHOLECALCIFEROL) 1000 UNIT Tab Take 1,000 Units by mouth every day.       No current facility-administered medications for this visit.        She  has a past medical history of Arthritis; Chronic back pain; Dental disorder; Pain (03/07/2018); and Urinary incontinence. She also has no past medical history of ASTHMA; Breast cancer (HCC); or  "Diabetes.    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 08/29/2018   • DDD (degenerative disc disease), cervical- [facility] [place] 08/09/2018   • Primary osteoarthritis of left hip- dr nowak; needs left THR 06/07/2018   • Obesity (BMI 30-39.9) 06/07/2018   • DDD (degenerative disc disease), lumbar 04/25/2018   • Uncomplicated opioid dependence (CMS-HCC)-  01/31/2018   • Essential hypertension 01/31/2018   • Venous insufficiency 10/03/2017   • Colon cancer screening- needs 08/09/2017   • Chronic bilateral low back pain with bilateral sciatica- pain mgt 07/26/2017   • Vitamin B 12 deficiency 06/02/2016   • Fibroids 08/13/2013       ROS   No fever or chills.  No nausea or vomiting.  No chest pain or palpitations.  No cough or SOB.  No pain with urination or hematuria.  No black or bloody stools.       Objective:     Blood pressure 126/74, pulse (!) 114, temperature 36.7 °C (98.1 °F), resp. rate 20, height 1.6 m (5' 3\"), weight 90.3 kg (199 lb), last menstrual period 05/17/2017, SpO2 95 %, not currently breastfeeding. Body mass index is 35.25 kg/m².   Physical Exam:  Well developed, well nourished.  Alert, oriented in no acute distress.  Eye contact is good, speech goal directed, affect calm  Eyes: conjunctiva non-injected, sclera non-icteric.  Left hip with some tenderness to palpation laterally but no deformity present.  There is slight ecchymosis inferiorly again without deformity.  Full range of motion          Assessment and Plan:   The following treatment plan was discussed    1. Left hip pain  Patient reassured.  She had a negative CT of the hip.  We discussed that the ecchymosis was secondary to gravity and the blood draining down.  She should ice the area and follow-up with orthopedic as scheduled.    Any change or worsening of " signs or symptoms, patient encouraged to follow-up or report to the emergency room for further evaluation. Patient understands and agrees.    Followup: Return if symptoms worsen or fail to improve.

## 2018-09-24 ENCOUNTER — HOSPITAL ENCOUNTER (OUTPATIENT)
Dept: RADIOLOGY | Facility: MEDICAL CENTER | Age: 53
End: 2018-09-24
Attending: NEUROLOGICAL SURGERY
Payer: COMMERCIAL

## 2018-09-24 DIAGNOSIS — M48.02 SPINAL STENOSIS IN CERVICAL REGION: ICD-10-CM

## 2018-09-24 PROCEDURE — 72020 X-RAY EXAM OF SPINE 1 VIEW: CPT

## 2018-09-25 ENCOUNTER — OFFICE VISIT (OUTPATIENT)
Dept: URGENT CARE | Facility: CLINIC | Age: 53
End: 2018-09-25
Payer: COMMERCIAL

## 2018-09-25 VITALS
HEIGHT: 63 IN | TEMPERATURE: 98.1 F | HEART RATE: 90 BPM | OXYGEN SATURATION: 97 % | WEIGHT: 201 LBS | SYSTOLIC BLOOD PRESSURE: 130 MMHG | RESPIRATION RATE: 16 BRPM | DIASTOLIC BLOOD PRESSURE: 98 MMHG | BODY MASS INDEX: 35.61 KG/M2

## 2018-09-25 DIAGNOSIS — S70.02XA CONTUSION OF LEFT HIP, INITIAL ENCOUNTER: ICD-10-CM

## 2018-09-25 DIAGNOSIS — M25.552 LEFT HIP PAIN: ICD-10-CM

## 2018-09-25 PROCEDURE — 99214 OFFICE O/P EST MOD 30 MIN: CPT | Performed by: NURSE PRACTITIONER

## 2018-09-25 RX ORDER — KETOROLAC TROMETHAMINE 30 MG/ML
30 INJECTION, SOLUTION INTRAMUSCULAR; INTRAVENOUS ONCE
Status: COMPLETED | OUTPATIENT
Start: 2018-09-25 | End: 2018-09-25

## 2018-09-25 RX ADMIN — KETOROLAC TROMETHAMINE 30 MG: 30 INJECTION, SOLUTION INTRAMUSCULAR; INTRAVENOUS at 19:23

## 2018-09-26 ENCOUNTER — OFFICE VISIT (OUTPATIENT)
Dept: MEDICAL GROUP | Age: 53
End: 2018-09-26
Payer: COMMERCIAL

## 2018-09-26 VITALS
SYSTOLIC BLOOD PRESSURE: 118 MMHG | BODY MASS INDEX: 35.61 KG/M2 | TEMPERATURE: 99.1 F | DIASTOLIC BLOOD PRESSURE: 78 MMHG | HEIGHT: 63 IN | HEART RATE: 72 BPM

## 2018-09-26 DIAGNOSIS — M16.12 PRIMARY OSTEOARTHRITIS OF LEFT HIP: ICD-10-CM

## 2018-09-26 DIAGNOSIS — M79.89 LEFT LEG SWELLING: ICD-10-CM

## 2018-09-26 DIAGNOSIS — M25.552 LEFT HIP PAIN: ICD-10-CM

## 2018-09-26 DIAGNOSIS — F11.20 UNCOMPLICATED OPIOID DEPENDENCE (HCC): ICD-10-CM

## 2018-09-26 DIAGNOSIS — Z23 NEEDS FLU SHOT: ICD-10-CM

## 2018-09-26 DIAGNOSIS — E66.9 OBESITY (BMI 30-39.9): ICD-10-CM

## 2018-09-26 DIAGNOSIS — Z12.11 COLON CANCER SCREENING: ICD-10-CM

## 2018-09-26 DIAGNOSIS — M50.30 DDD (DEGENERATIVE DISC DISEASE), CERVICAL: ICD-10-CM

## 2018-09-26 DIAGNOSIS — I10 ESSENTIAL HYPERTENSION: ICD-10-CM

## 2018-09-26 PROCEDURE — 90471 IMMUNIZATION ADMIN: CPT | Performed by: INTERNAL MEDICINE

## 2018-09-26 PROCEDURE — 90686 IIV4 VACC NO PRSV 0.5 ML IM: CPT | Performed by: INTERNAL MEDICINE

## 2018-09-26 PROCEDURE — 99214 OFFICE O/P EST MOD 30 MIN: CPT | Mod: 25 | Performed by: INTERNAL MEDICINE

## 2018-09-26 NOTE — LETTER
September 26, 2018    To Whom It May Concern:         This is confirmation that Karine Ovalle attended her scheduled appointment with Micky Rubin M.D. on 9/26/18. She should only work half scheduled day today 09/26/18 and no work tomorrow 09/27/2018. She may return to work  09/28/2018         If you have any questions please do not hesitate to call me at the phone number listed below.    Sincerely,          Dr Dustin Rubin M.D.  [address] [place], [place] [address]  [phone]

## 2018-09-27 ASSESSMENT — ENCOUNTER SYMPTOMS
MYALGIAS: 1
GASTROINTESTINAL NEGATIVE: 1
PSYCHIATRIC NEGATIVE: 1
RESPIRATORY NEGATIVE: 1
CONSTITUTIONAL NEGATIVE: 1
NEUROLOGICAL NEGATIVE: 1
EYES NEGATIVE: 1

## 2018-09-27 NOTE — PROGRESS NOTES
Subjective:      Karine Ovalle is a 53 y.o. female who presents with Follow-Up (post fall 09/11/18 left leg)  Patient is here for follow-up of her swollen left leg.  She slipped and fell and injured her hip and went to urgent care or the ER where x-rays showed no fracture.  She has severe DJD of the hip and needs to have left hip replacement.  However she also has severe DDD of cervical spine for which she needs cervical laminectomy which is yet to be scheduled and needs to be done before her left hip replacement.    She denies any chest pain or dyspnea.  She been trying to elevate her leg and apply ice packs.  She is unable to work because of severe pain and needs work excuse for the next day and a half.    And  The patient is here for followup of chronic medical problems listed below. The patient is compliant with medications and having no side effects from them. Denies chest pain, abdominal pain, dyspnea, myalgias, or cough.   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 08/29/2018   • DDD (degenerative disc disease), cervical- MOD-SEVERE SPINE [place]- dr brennan; needs laminectomy nov 2018 08/09/2018   • Primary osteoarthritis of left hip- dr nowak; needs left THR 06/07/2018   • Obesity (BMI 30-39.9) 06/07/2018   • DDD (degenerative disc disease), lumbar 04/25/2018   • Uncomplicated opioid dependence (CMS-HCC)- kylah adkins 01/31/2018   • Essential hypertension 01/31/2018   • Venous insufficiency 10/03/2017   • Colon cancer screening- needs 08/09/2017   • Chronic bilateral low back pain with bilateral sciatica- pain mgt 07/26/2017   • Vitamin B 12 deficiency 06/02/2016   • Fibroids 08/13/2013               HPI    Review of Systems   Constitutional: Negative.    HENT: Negative.    Eyes: Negative.    Respiratory: Negative.   "  Cardiovascular: Positive for leg swelling.   Gastrointestinal: Negative.    Genitourinary: Negative.    Musculoskeletal: Positive for myalgias.   Skin: Negative.    Neurological: Negative.    Endo/Heme/Allergies: Negative.    Psychiatric/Behavioral: Negative.           Objective:     /78 (BP Location: Right arm, Patient Position: Sitting)   Pulse 72   Temp 37.3 °C (99.1 °F) (Temporal)   Ht 1.6 m (5' 2.99\")   LMP 05/17/2017   BMI 35.61 kg/m²      Physical Exam   Constitutional: She is oriented to person, place, and time. She appears well-developed and well-nourished. No distress.   HENT:   Head: Normocephalic and atraumatic.   Right Ear: External ear normal.   Left Ear: External ear normal.   Nose: Nose normal.   Mouth/Throat: Oropharynx is clear and moist. No oropharyngeal exudate.   Eyes: Pupils are equal, round, and reactive to light. Conjunctivae and EOM are normal. Right eye exhibits no discharge. Left eye exhibits no discharge. No scleral icterus.   Neck: Normal range of motion. Neck supple. No JVD present. No tracheal deviation present. No thyromegaly present.   Cardiovascular: Normal rate, regular rhythm, normal heart sounds and intact distal pulses.  Exam reveals no gallop and no friction rub.    No murmur heard.  Pulmonary/Chest: Effort normal and breath sounds normal. No stridor. No respiratory distress. She has no wheezes. She has no rales. She exhibits no tenderness.   Abdominal: Soft. Bowel sounds are normal. She exhibits no distension and no mass. There is no rebound and no guarding.   Musculoskeletal: Normal range of motion. She exhibits edema and tenderness.   There is some increased swelling and tenderness of the left calf.  No erythema.  No increased warmth.   Lymphadenopathy:     She has no cervical adenopathy.   Neurological: She is alert and oriented to person, place, and time. She has normal reflexes. She displays normal reflexes. No cranial nerve deficit. She exhibits normal " muscle tone. Coordination normal.   Skin: Skin is warm and dry. No rash noted. She is not diaphoretic. No erythema. No pallor.   Psychiatric: She has a normal mood and affect. Her behavior is normal. Judgment and thought content normal.   Vitals reviewed.    No visits with results within 1 Month(s) from this visit.   Latest known visit with results is:   Hospital Outpatient Visit on 07/06/2018   Component Date Value   • Sodium 07/06/2018 140    • Potassium 07/06/2018 3.7    • Chloride 07/06/2018 104    • Co2 07/06/2018 25    • Glucose 07/06/2018 64*   • Bun 07/06/2018 13    • Creatinine 07/06/2018 0.76    • Calcium 07/06/2018 9.4    • Anion Gap 07/06/2018 11.0    • GFR If  07/06/2018 >60    • GFR If Non  Ameri* 07/06/2018 >60       Lab Results   Component Value Date/Time    HBA1C 5.3 06/12/2017 09:03 AM     Lab Results   Component Value Date/Time    SODIUM 140 07/06/2018 11:41 AM    POTASSIUM 3.7 07/06/2018 11:41 AM    CHLORIDE 104 07/06/2018 11:41 AM    CO2 25 07/06/2018 11:41 AM    GLUCOSE 64 (L) 07/06/2018 11:41 AM    BUN 13 07/06/2018 11:41 AM    CREATININE 0.76 07/06/2018 11:41 AM    CREATININE 0.88 08/14/2010 12:00 AM    BUNCREATRAT 13 08/14/2010 12:00 AM    GLOMRATE >59 08/14/2010 12:00 AM    ALKPHOSPHAT 90 02/22/2018 05:05 PM    ASTSGOT 16 02/22/2018 05:05 PM    ALTSGPT 8 02/22/2018 05:05 PM    TBILIRUBIN 0.4 02/22/2018 05:05 PM     Lab Results   Component Value Date/Time    INR 1.04 05/18/2012 12:20 PM     Lab Results   Component Value Date/Time    CHOLSTRLTOT 168 02/22/2018 05:05 PM    LDL 84 02/22/2018 05:05 PM    HDL 67 02/22/2018 05:05 PM    TRIGLYCERIDE 85 02/22/2018 05:05 PM       No results found for: TESTOSTERONE  Lab Results   Component Value Date/Time    TSH 2.110 08/14/2010 12:00 AM     Lab Results   Component Value Date/Time    FREET4 0.72 05/03/2016 07:32 AM     Lab Results   Component Value Date/Time    URICACID 5.6 08/12/2014 04:49 PM     No components found for:  VITB12  Lab Results   Component Value Date/Time    25HYDROXY 33 08/12/2014 04:49 PM    25HYDROXY 25 (L) 07/03/2012 09:20 AM                 Assessment/Plan:     1. Left leg swelling-chronic problem but worse in the last week since the fall.  We will get ultrasound of the left lower leg venous system to rule out DVT.  In the meantime apply ice packs and keep elevated and minimize weightbearing.  No work for the next day and a half.  Work excuse given.     - US-EXTREMITY VENOUS LOWER UNILAT LEFT; Future    2. Left hip pain  Needs left hip replacement.  Pending.  Continue follow-up with her orthopedic surgeon.    3. DDD (degenerative disc disease), cervical- MOD-SEVERE SPINE [place]- dr brennan; needs laminectomy nov 2018  Stable.  Defer to her spinal surgeon for further management this    4. Obesity (BMI 30-39.9)    diet/exercise/lose 15 lbs.; patient counseled      5. Primary osteoarthritis of left hip- dr nowak; needs left THR    above    6. Essential hypertension          Under good control. Continue same regimen.  7. Colon cancer screening- needs     - COLOGUARD (FIT DNA)    8. Uncomplicated opioid dependence (CMS-HCC)- kylah adkins        Under good control. Continue same regimen.  9. Needs flu shot        - Influenza Vaccine Quad Injection >3Y (PF)    30 minute face-to-face encounter took place today.  More than half of this time was spent in the coordination of care of the above problems, as well as counseling.

## 2018-09-29 ASSESSMENT — ENCOUNTER SYMPTOMS: FALLS: 1

## 2018-09-29 NOTE — PROGRESS NOTES
"Subjective:      Karine Ovalle is a 53 y.o. female who presents with Hip Pain (pt. states the pain she had once before moved down to her thigh, pt. states she just wants another shot of toradol, because that helped her before.)            This is a new problem. Pt states she fell recently on her left hip and contused it per the ER doctor she saw after the fall. Xray and CT ruled out any acute fractures. She does have a hx of DJD and is scheduled to have a hip replacement in the near future. Admits she is here because she wants an injection of toradol as this seems to help her pain. She is being treated for chronic pain and cannot receive any narcotics from the clinic. She also needs a work note as she missed today. Denies any new concerning problems.        Review of Systems   Musculoskeletal: Positive for falls and joint pain (left hip).   All other systems reviewed and are negative.    Past Medical History:   Diagnosis Date   • Arthritis     osteo/hips and back   • Chronic back pain    • Dental disorder     partial upper   • Pain 03/07/2018    right hip and back, 10/10   • Urinary incontinence       Past Surgical History:   Procedure Laterality Date   • HIP ARTHROPLASTY TOTAL Right 3/20/2018    Procedure: HIP ARTHROPLASTY TOTAL;  Surgeon: Bryon Levine M.D.;  Location: SURGERY [facility];  Service: Orthopedics   • KNEE ARTHROPLASTY TOTAL Right 10/20/2015    Procedure: KNEE ARTHROPLASTY TOTAL;  Surgeon: Bryon Levine M.D.;  Location: SURGERY [facility];  Service:    • APPENDECTOMY LAPAROSCOPIC  3/21/2013    Performed by Dali Queen M.D. at [facility]   • DEBRIDEMENT  5/17/2012    Performed by BRADLEY DYKES at [facility]   • PLASTIC SURGERY  2004    excess skin on arms and legs removed   • MAMMOPLASTY AUGMENTATION Bilateral 2004    breast implants and \"tummy tuck\"   • GASTRIC BYPASS LAPAROSCOPIC  2002    x 2   • ABDOMINAL EXPLORATION        Social History " "    Social History   • Marital status: Single     Spouse name: N/A   • Number of children: N/A   • Years of education: N/A     Occupational History   • Not on file.     Social History Main Topics   • Smoking status: Never Smoker   • Smokeless tobacco: Never Used   • Alcohol use Yes      Comment: OCC   • Drug use: No   • Sexual activity: No     Other Topics Concern   • Not on file     Social History Narrative    ** Merged History Encounter **               Objective:     /98 (BP Location: Left arm, Patient Position: Sitting, BP Cuff Size: Large adult)   Pulse 90   Temp 36.7 °C (98.1 °F) (Temporal)   Resp 16   Ht 1.6 m (5' 3\")   Wt 91.2 kg (201 lb)   LMP 05/17/2017   SpO2 97%   BMI 35.61 kg/m²      Physical Exam   Constitutional: She is oriented to person, place, and time. Vital signs are normal. She appears well-developed and well-nourished.   HENT:   Head: Normocephalic and atraumatic.   Eyes: Pupils are equal, round, and reactive to light. EOM are normal.   Neck: Normal range of motion.   Cardiovascular: Normal rate and regular rhythm.    Pulmonary/Chest: Effort normal.   Musculoskeletal: Normal range of motion.        Left hip: She exhibits tenderness. She exhibits no bony tenderness, no crepitus and no deformity.   Neurological: She is alert and oriented to person, place, and time.   Skin: Skin is warm and dry. Capillary refill takes less than 2 seconds.   Psychiatric: She has a normal mood and affect. Her speech is normal and behavior is normal. Thought content normal.   Vitals reviewed.              Assessment/Plan:     1. Left hip pain  - ketorolac (TORADOL) injection 30 mg; 1 mL by Intramuscular route Once.    2. Contusion of left hip, initial encounter    Tolerated toradol injection  She has an appt with her PCP tomorrow for [facility]  Advised her not to take any additional ibuprofen tonight, only tylenol  Ice and rest her hip  Work note provided  Supportive care, differential diagnoses, and " indications for immediate follow-up discussed with patient.    Pathogenesis of diagnosis discussed including typical length and natural progression.      Instructed to return to  or nearest emergency department if symptoms fail to improve, for any change in condition, further concerns, or new concerning symptoms.  Patient states understanding of the plan of care and discharge instructions.

## 2018-10-02 ENCOUNTER — OFFICE VISIT (OUTPATIENT)
Dept: URGENT CARE | Facility: CLINIC | Age: 53
End: 2018-10-02
Payer: COMMERCIAL

## 2018-10-02 VITALS
OXYGEN SATURATION: 96 % | WEIGHT: 190 LBS | TEMPERATURE: 97.4 F | BODY MASS INDEX: 33.66 KG/M2 | SYSTOLIC BLOOD PRESSURE: 130 MMHG | DIASTOLIC BLOOD PRESSURE: 82 MMHG | HEIGHT: 63 IN | HEART RATE: 100 BPM

## 2018-10-02 DIAGNOSIS — M25.552 CHRONIC LEFT HIP PAIN: ICD-10-CM

## 2018-10-02 DIAGNOSIS — G89.29 CHRONIC LEFT HIP PAIN: ICD-10-CM

## 2018-10-02 PROCEDURE — 99214 OFFICE O/P EST MOD 30 MIN: CPT | Performed by: NURSE PRACTITIONER

## 2018-10-02 RX ORDER — KETOROLAC TROMETHAMINE 30 MG/ML
30 INJECTION, SOLUTION INTRAMUSCULAR; INTRAVENOUS ONCE
Status: COMPLETED | OUTPATIENT
Start: 2018-10-02 | End: 2018-10-02

## 2018-10-02 RX ADMIN — KETOROLAC TROMETHAMINE 30 MG: 30 INJECTION, SOLUTION INTRAMUSCULAR; INTRAVENOUS at 19:17

## 2018-10-02 ASSESSMENT — ENCOUNTER SYMPTOMS
FEVER: 0
CHILLS: 0
HIP PAIN: 1

## 2018-10-03 NOTE — PROGRESS NOTES
Subjective:      Karine Ovalle is a 53 y.o. female who presents with Hip Pain (left hip)    Past Medical History:   Diagnosis Date   • Arthritis     osteo/hips and back   • Chronic back pain    • Dental disorder     partial upper   • Pain 03/07/2018    right hip and back, 10/10   • Urinary incontinence      Social History     Social History   • Marital status: Single     Spouse name: N/A   • Number of children: N/A   • Years of education: N/A     Occupational History   • Not on file.     Social History Main Topics   • Smoking status: Never Smoker   • Smokeless tobacco: Never Used   • Alcohol use Yes      Comment: OCC   • Drug use: No   • Sexual activity: No     Other Topics Concern   • Not on file     Social History Narrative    ** Merged History Encounter **          Family History   Problem Relation Age of Onset   • Diabetes Mother    • Heart Disease Mother        Allergies: Zofran [dextrose-ondansetron]    Patient is a 53-year-old female who presents today with complaint of chronic left hip pain. She's had multiple visits with the urgent care for this problem. She is requesting a Toradol shot today and states that usually helps. Her primary care physician is Dr. Rubin and she has seen him last week. Patient states she is scheduled to have surgery on her neck in November and tentatively on her hip in December. No new fall or trauma.          Hip Pain   This is a chronic problem. The current episode started more than 1 year ago. The problem occurs constantly. The problem has been unchanged. Pertinent negatives include no chills or fever. The symptoms are aggravated by walking, twisting, stress, standing, exertion and bending. She has tried oral narcotics, position changes, rest, heat, ice and NSAIDs for the symptoms. The treatment provided mild relief.       Review of Systems   Constitutional: Negative for chills and fever.   Musculoskeletal:        Left hip pain   All other systems reviewed and are  "negative.         Objective:     /82 (BP Location: Left arm, Patient Position: Sitting, BP Cuff Size: Adult)   Pulse 100   Temp 36.3 °C (97.4 °F) (Temporal)   Ht 1.6 m (5' 3\")   Wt 86.2 kg (190 lb)   LMP 05/17/2017   SpO2 96%   BMI 33.66 kg/m²      Physical Exam   Constitutional: She is oriented to person, place, and time. She appears well-developed and well-nourished.   Musculoskeletal:        Legs:  Patient to the lateral aspect of the left hip radiating into the left thigh. Pain is characteristic of what she has experienced previously.   Neurological: She is alert and oriented to person, place, and time.   Skin: Skin is warm and dry. Capillary refill takes less than 2 seconds.   Psychiatric: She has a normal mood and affect. Her behavior is normal. Judgment and thought content normal.   Vitals reviewed.              Assessment/Plan:   Chronic left hip pain  -Toradol 30 mg IM  -Ice/heat  -Keep follow-up appointment with Dr. Rubin her PCP return to urgent care for any further questions or concerns.  -  There are no diagnoses linked to this encounter.      "

## 2018-10-05 ENCOUNTER — OFFICE VISIT (OUTPATIENT)
Dept: URGENT CARE | Facility: CLINIC | Age: 53
End: 2018-10-05
Payer: COMMERCIAL

## 2018-10-05 VITALS
OXYGEN SATURATION: 97 % | WEIGHT: 194 LBS | HEIGHT: 63 IN | HEART RATE: 112 BPM | SYSTOLIC BLOOD PRESSURE: 128 MMHG | DIASTOLIC BLOOD PRESSURE: 74 MMHG | BODY MASS INDEX: 34.38 KG/M2 | TEMPERATURE: 99.3 F

## 2018-10-05 DIAGNOSIS — R11.0 NAUSEA: ICD-10-CM

## 2018-10-05 PROCEDURE — 99214 OFFICE O/P EST MOD 30 MIN: CPT | Performed by: PHYSICIAN ASSISTANT

## 2018-10-05 RX ORDER — ONDANSETRON 4 MG/1
4 TABLET, FILM COATED ORAL EVERY 4 HOURS PRN
Qty: 24 TAB | Refills: 0 | Status: SHIPPED | OUTPATIENT
Start: 2018-10-05 | End: 2018-12-04

## 2018-10-05 ASSESSMENT — ENCOUNTER SYMPTOMS
HEADACHES: 0
FEVER: 0
DIAPHORESIS: 0
BLOOD IN STOOL: 0
COUGH: 0
CHANGE IN BOWEL HABIT: 0
SHORTNESS OF BREATH: 0
ABDOMINAL PAIN: 0
WEIGHT LOSS: 0
NAUSEA: 1
DIARRHEA: 0
PALPITATIONS: 0
CHILLS: 0
DIZZINESS: 0
VOMITING: 1
HEARTBURN: 0
WEAKNESS: 0
CONSTIPATION: 0

## 2018-10-05 NOTE — LETTER
October 5, 2018         Patient: Karine Ovalle   YOB: 1965   Date of Visit: 10/5/2018           To Whom it May Concern:    Karine Ovalle was seen in my clinic on 10/5/2018. She may return back to work on 10/8/2018.  If you have any questions or concerns, please don't hesitate to call.        Sincerely,           Steve Hurst P.A.-C.  Electronically Signed

## 2018-10-06 NOTE — PROGRESS NOTES
Subjective:      Karine Ovalle is a 53 y.o. female who presents with Nausea (x1 day)            Nausea   This is a new problem. The current episode started today. The problem occurs constantly. The problem has been waxing and waning. Associated symptoms include nausea and vomiting. Pertinent negatives include no abdominal pain, change in bowel habit, chest pain, chills, coughing, diaphoresis, fever, headaches, rash or weakness. Nothing aggravates the symptoms. She has tried nothing for the symptoms.       Review of Systems   Constitutional: Negative for chills, diaphoresis, fever, malaise/fatigue and weight loss.   Respiratory: Negative for cough and shortness of breath.    Cardiovascular: Negative for chest pain and palpitations.   Gastrointestinal: Positive for nausea and vomiting. Negative for abdominal pain, blood in stool, change in bowel habit, constipation, diarrhea, heartburn and melena.   Skin: Negative for itching and rash.   Neurological: Negative for dizziness, weakness and headaches.   All other systems reviewed and are negative.    PMH:  has a past medical history of Arthritis; Chronic back pain; Dental disorder; Pain (03/07/2018); and Urinary incontinence. She also has no past medical history of ASTHMA; Breast cancer (HCC); or Diabetes.  MEDS:   Current Outpatient Prescriptions:   •  ondansetron (ZOFRAN) 4 MG Tab tablet, Take 1 Tab by mouth every four hours as needed for Nausea/Vomiting., Disp: 24 Tab, Rfl: 0  •  MethylPREDNISolone (MEDROL DOSEPAK) 4 MG Tablet Therapy Pack, Take 1 Tab by mouth every day., Disp: 1 Kit, Rfl: 0  •  metaxalone (SKELAXIN) 800 MG Tab, Take 1 Tab by mouth 3 times a day., Disp: 60 Tab, Rfl: 0  •  ketorolac (TORADOL) 10 MG Tab, Take 1 Tab by mouth every 6 hours as needed., Disp: 20 Tab, Rfl: 0  •  metaxalone (SKELAXIN) 800 MG Tab, , Disp: , Rfl:   •  olopatadine (PATANOL) 0.1 % ophthalmic solution, Place 1 Drop in both eyes 2 times a day., Disp: 5 mL, Rfl: 11  •   "furosemide (LASIX) 40 MG Tab, Take 1 Tab by mouth every day., Disp: 90 Tab, Rfl: 3  •  potassium chloride SA (KDUR) 20 MEQ Tab CR, Take 1 Tab by mouth every day., Disp: 90 Tab, Rfl: 4  •  omeprazole (PRILOSEC) 20 MG delayed-release capsule, Take 1 Cap by mouth every day., Disp: 30 Cap, Rfl: 0  •  cyanocobalamin (VITAMIN B12) 1000 MCG Tab, Take 1 Tab by mouth every day., Disp: 90 Tab, Rfl: 4  •  multivitamin (THERAGRAN) Tab, Take 1 Tab by mouth every day., Disp: , Rfl:   •  vitamin D (CHOLECALCIFEROL) 1000 UNIT Tab, Take 1,000 Units by mouth every day., Disp: , Rfl:   ALLERGIES:   Allergies   Allergen Reactions   • Zofran [Dextrose-Ondansetron] Nausea     SURGHX:   Past Surgical History:   Procedure Laterality Date   • HIP ARTHROPLASTY TOTAL Right 3/20/2018    Procedure: HIP ARTHROPLASTY TOTAL;  Surgeon: Bryon Levine M.D.;  Location: SURGERY [facility];  Service: Orthopedics   • KNEE ARTHROPLASTY TOTAL Right 10/20/2015    Procedure: KNEE ARTHROPLASTY TOTAL;  Surgeon: Bryon Levine M.D.;  Location: SURGERY [facility];  Service:    • APPENDECTOMY LAPAROSCOPIC  3/21/2013    Performed by Dali Queen M.D. at [facility]   • DEBRIDEMENT  5/17/2012    Performed by BRADLEY DYKES at SURGERY [facility]   • PLASTIC SURGERY  2004    excess skin on arms and legs removed   • MAMMOPLASTY AUGMENTATION Bilateral 2004    breast implants and \"tummy tuck\"   • GASTRIC BYPASS LAPAROSCOPIC  2002    x 2   • ABDOMINAL EXPLORATION       SOCHX:  reports that she has never smoked. She has never used smokeless tobacco. She reports that she drinks alcohol. She reports that she does not use drugs.  FH: Family history was reviewed, no pertinent findings to report  Medications, Allergies, and current problem list reviewed today in Epic         Objective:     /74 (BP Location: Right arm, Patient Position: Sitting, BP Cuff Size: Adult)   Pulse (!) 112   Temp 37.4 °C (99.3 °F) (Temporal)   Ht 1.6 m (5' " "3\")   Wt 88 kg (194 lb)   LMP 05/17/2017   SpO2 97%   BMI 34.37 kg/m²      Physical Exam   Constitutional: She is oriented to person, place, and time. She appears well-developed and well-nourished. She is active.  Non-toxic appearance. She does not have a sickly appearance. She does not appear ill. No distress.   HENT:   Head: Normocephalic and atraumatic.   Right Ear: External ear normal.   Left Ear: External ear normal.   Nose: Nose normal.   Mouth/Throat: Oropharynx is clear and moist.   Eyes: Conjunctivae, EOM and lids are normal.   Neck: Normal range of motion and full passive range of motion without pain. Neck supple.   Cardiovascular: Normal rate, regular rhythm, S1 normal, S2 normal and normal heart sounds.  Exam reveals no gallop and no friction rub.    No murmur heard.  Pulmonary/Chest: Effort normal and breath sounds normal. No respiratory distress. She has no decreased breath sounds. She has no wheezes. She has no rales. She exhibits no tenderness.   Abdominal: Soft. Normal appearance and bowel sounds are normal. She exhibits no shifting dullness, no distension, no pulsatile liver, no fluid wave, no abdominal bruit, no ascites, no pulsatile midline mass and no mass. There is no tenderness. There is no rigidity, no rebound, no guarding, no CVA tenderness, no tenderness at McBurney's point and negative Page's sign. No hernia.   Musculoskeletal: Normal range of motion. She exhibits no edema, tenderness or deformity.   Neurological: She is alert and oriented to person, place, and time.   Skin: Skin is warm, dry and intact. She is not diaphoretic.   Psychiatric: She has a normal mood and affect. Her speech is normal and behavior is normal. Judgment and thought content normal. Cognition and memory are normal.   Vitals reviewed.              Assessment/Plan:   Pt is a 53 yr old female who presents with nausea and for one day.  She suspects she ate bad chinese food.  She denies any abdominal pain or " diarrhea.  Vitals normal.  No pain to palpation of abdomen.  Most likely gastroenteritis.  Strict ED precautions given.    1. Nausea    - ondansetron (ZOFRAN) 4 MG Tab tablet; Take 1 Tab by mouth every four hours as needed for Nausea/Vomiting.  Dispense: 24 Tab; Refill: 0

## 2018-10-11 ENCOUNTER — APPOINTMENT (OUTPATIENT)
Dept: MEDICAL GROUP | Age: 53
End: 2018-10-11
Payer: COMMERCIAL

## 2018-10-11 ENCOUNTER — OFFICE VISIT (OUTPATIENT)
Dept: URGENT CARE | Facility: CLINIC | Age: 53
End: 2018-10-11
Payer: COMMERCIAL

## 2018-10-11 ENCOUNTER — TELEPHONE (OUTPATIENT)
Dept: MEDICAL GROUP | Age: 53
End: 2018-10-11

## 2018-10-11 VITALS
TEMPERATURE: 98.1 F | WEIGHT: 199 LBS | RESPIRATION RATE: 16 BRPM | BODY MASS INDEX: 35.26 KG/M2 | SYSTOLIC BLOOD PRESSURE: 152 MMHG | HEIGHT: 63 IN | DIASTOLIC BLOOD PRESSURE: 94 MMHG | OXYGEN SATURATION: 98 % | HEART RATE: 102 BPM

## 2018-10-11 DIAGNOSIS — M25.552 CHRONIC HIP PAIN, LEFT: ICD-10-CM

## 2018-10-11 DIAGNOSIS — E66.9 OBESITY WITHOUT SERIOUS COMORBIDITY, UNSPECIFIED CLASSIFICATION, UNSPECIFIED OBESITY TYPE: ICD-10-CM

## 2018-10-11 DIAGNOSIS — G89.29 CHRONIC HIP PAIN, LEFT: ICD-10-CM

## 2018-10-11 DIAGNOSIS — M16.12 PRIMARY OSTEOARTHRITIS OF LEFT HIP: ICD-10-CM

## 2018-10-11 PROCEDURE — 99213 OFFICE O/P EST LOW 20 MIN: CPT | Performed by: EMERGENCY MEDICINE

## 2018-10-11 RX ORDER — CELECOXIB 200 MG/1
200-400 CAPSULE ORAL
Qty: 12 CAP | Refills: 0 | Status: SHIPPED | OUTPATIENT
Start: 2018-10-11 | End: 2018-12-04

## 2018-10-11 ASSESSMENT — ENCOUNTER SYMPTOMS
SENSORY CHANGE: 0
TINGLING: 0
FEVER: 0
HIP PAIN: 1
NUMBNESS: 0
BACK PAIN: 1
FOCAL WEAKNESS: 0

## 2018-10-11 NOTE — LETTER
October 11, 2018       Patient: Karine Ovalle   YOB: 1965   Date of Visit: 10/11/2018         To Whom It May Concern:    Karine Ovalle was unable to attend work today for medical reasons.      Sincerely,          Deangelo Russell M.D.  Electronically Signed

## 2018-10-11 NOTE — LETTER
October 11, 2018       Patient: Karine Ovalle   YOB: 1965   Date of Visit: 10/11/2018         To Whom It May Concern:    Karine Ovalle required visit to clinic today for medical reasons.    Sincerely,          Deangelo Russell M.D.  Electronically Signed

## 2018-10-12 DIAGNOSIS — E66.01 OBESITIES, MORBID (HCC): ICD-10-CM

## 2018-10-12 RX ORDER — PHENTERMINE HYDROCHLORIDE 30 MG/1
30 CAPSULE ORAL EVERY MORNING
Qty: 30 CAP | Refills: 0 | Status: SHIPPED | OUTPATIENT
Start: 2018-10-12 | End: 2018-11-11

## 2018-10-12 NOTE — PROGRESS NOTES
Subjective:      Karine Ovalle is a 53 y.o. female who presents with Hip Pain (left hip, pt. is requesting Toradol, stating that her PCP did not give it to her.)            Hip Pain   This is a chronic problem. The current episode started more than 1 year ago. The problem occurs daily. The problem has been waxing and waning. Pertinent negatives include no fever, numbness or rash. The symptoms are aggravated by walking (change in weather). She has tried rest for the symptoms. The treatment provided mild relief.   Patient requests injectable Toradol as she has had relief from it in the past.  On further discussion she has been seeing her pain clinic physician for back pain but has not requested anything for her chronic left hip pain.  She is anticipating total hip replacement in a few months.  She notes intermittent flares of her left hip pain, such as with the change in weather.  She notes that she has recently gotten prescription for metaxalone, but has not been using it for her hip pain.  Denies new trauma.    Review of Systems   Constitutional: Negative for fever.   Musculoskeletal: Positive for back pain.   Skin: Negative for rash.   Neurological: Negative for tingling, sensory change, focal weakness and numbness.     PMH:  has a past medical history of Arthritis; Chronic back pain; Dental disorder; Pain (03/07/2018); and Urinary incontinence. She also has no past medical history of ASTHMA; Breast cancer (HCC); or Diabetes.  MEDS:   Current Outpatient Prescriptions:   •  celecoxib (CELEBREX) 200 MG Cap, Take 1-2 Caps by mouth 1 time daily as needed for Moderate Pain., Disp: 12 Cap, Rfl: 0  •  ondansetron (ZOFRAN) 4 MG Tab tablet, Take 1 Tab by mouth every four hours as needed for Nausea/Vomiting., Disp: 24 Tab, Rfl: 0  •  MethylPREDNISolone (MEDROL DOSEPAK) 4 MG Tablet Therapy Pack, Take 1 Tab by mouth every day., Disp: 1 Kit, Rfl: 0  •  metaxalone (SKELAXIN) 800 MG Tab, Take 1 Tab by mouth 3 times a day.,  "Disp: 60 Tab, Rfl: 0  •  ketorolac (TORADOL) 10 MG Tab, Take 1 Tab by mouth every 6 hours as needed., Disp: 20 Tab, Rfl: 0  •  metaxalone (SKELAXIN) 800 MG Tab, , Disp: , Rfl:   •  olopatadine (PATANOL) 0.1 % ophthalmic solution, Place 1 Drop in both eyes 2 times a day., Disp: 5 mL, Rfl: 11  •  furosemide (LASIX) 40 MG Tab, Take 1 Tab by mouth every day., Disp: 90 Tab, Rfl: 3  •  potassium chloride SA (KDUR) 20 MEQ Tab CR, Take 1 Tab by mouth every day., Disp: 90 Tab, Rfl: 4  •  omeprazole (PRILOSEC) 20 MG delayed-release capsule, Take 1 Cap by mouth every day., Disp: 30 Cap, Rfl: 0  •  cyanocobalamin (VITAMIN B12) 1000 MCG Tab, Take 1 Tab by mouth every day., Disp: 90 Tab, Rfl: 4  •  multivitamin (THERAGRAN) Tab, Take 1 Tab by mouth every day., Disp: , Rfl:   •  vitamin D (CHOLECALCIFEROL) 1000 UNIT Tab, Take 1,000 Units by mouth every day., Disp: , Rfl:   ALLERGIES:   Allergies   Allergen Reactions   • Zofran [Dextrose-Ondansetron] Nausea     SURGHX:   Past Surgical History:   Procedure Laterality Date   • HIP ARTHROPLASTY TOTAL Right 3/20/2018    Procedure: HIP ARTHROPLASTY TOTAL;  Surgeon: Bryon Levine M.D.;  Location: [facility];  Service: Orthopedics   • KNEE ARTHROPLASTY TOTAL Right 10/20/2015    Procedure: KNEE ARTHROPLASTY TOTAL;  Surgeon: Bryon Levine M.D.;  Location: [facility];  Service:    • APPENDECTOMY LAPAROSCOPIC  3/21/2013    Performed by Dali Queen M.D. at [facility]   • DEBRIDEMENT  5/17/2012    Performed by BRADLEY DYKES at [facility]   • PLASTIC SURGERY  2004    excess skin on arms and legs removed   • MAMMOPLASTY AUGMENTATION Bilateral 2004    breast implants and \"tummy tuck\"   • GASTRIC BYPASS LAPAROSCOPIC  2002    x 2   • ABDOMINAL EXPLORATION       SOCHX:  reports that she has never smoked. She has never used smokeless tobacco. She reports that she drinks alcohol. She reports that she does not use drugs.  FH: family history " "includes Diabetes in her mother; Heart Disease in her mother.       Objective:     /94 (BP Location: Left arm, Patient Position: Standing, BP Cuff Size: Adult)   Pulse (!) 102   Temp 36.7 °C (98.1 °F) (Temporal)   Resp 16   Ht 1.6 m (5' 3\")   Wt 90.3 kg (199 lb)   LMP 05/17/2017   SpO2 98%   BMI 35.25 kg/m²      Physical Exam   Constitutional: She appears well-developed and well-nourished. She is cooperative. She does not have a sickly appearance. She does not appear ill. No distress.   Cardiovascular:   No significant pedal edema. Distal capillary refill intact.   Pulmonary/Chest: Effort normal.   Musculoskeletal:        Left hip: She exhibits decreased range of motion, tenderness and crepitus. She exhibits no bony tenderness and no deformity.   Neurological: She is alert. She displays no tremor. Coordination normal.   Distal motor function intact. Distal sensation to light touch and pressure intact.   Skin: Skin is warm and dry. No rash noted.   Psychiatric: She has a normal mood and affect.               Assessment/Plan:     1. Chronic hip pain, left  - celecoxib (CELEBREX) 200 MG Cap; Take 1-2 Caps by mouth 1 time daily as needed for Moderate Pain.  Dispense: 12 Cap; Refill: 0  - REFERRAL TO PAIN CLINIC    2. Primary osteoarthritis of left hip      "

## 2018-10-12 NOTE — TELEPHONE ENCOUNTER
1. Name: Karine Ovalle    Call Back Number: [phone]   Patient approves a detailed voicemail message: yes    2. Which medication(s) is being requested? metaxalone (SKELAXIN)        3. What is the preferred Pharmacy? [facility] on [address]     Patient was informed they may receive a return phone call from our office with any additional questions before processing this request.

## 2018-10-15 RX ORDER — PHENTERMINE HYDROCHLORIDE 37.5 MG/1
CAPSULE ORAL
Qty: 30 CAP | Refills: 2 | Status: SHIPPED | OUTPATIENT
Start: 2018-10-15 | End: 2018-11-14

## 2018-10-18 ENCOUNTER — OFFICE VISIT (OUTPATIENT)
Dept: MEDICAL GROUP | Age: 53
End: 2018-10-18
Payer: COMMERCIAL

## 2018-10-18 VITALS
RESPIRATION RATE: 12 BRPM | HEIGHT: 63 IN | SYSTOLIC BLOOD PRESSURE: 132 MMHG | WEIGHT: 195.6 LBS | TEMPERATURE: 98 F | OXYGEN SATURATION: 97 % | BODY MASS INDEX: 34.66 KG/M2 | HEART RATE: 108 BPM | DIASTOLIC BLOOD PRESSURE: 96 MMHG

## 2018-10-18 DIAGNOSIS — M54.42 CHRONIC BILATERAL LOW BACK PAIN WITH BILATERAL SCIATICA: ICD-10-CM

## 2018-10-18 DIAGNOSIS — G89.29 CHRONIC BILATERAL LOW BACK PAIN WITH BILATERAL SCIATICA: ICD-10-CM

## 2018-10-18 DIAGNOSIS — M16.12 PRIMARY OSTEOARTHRITIS OF LEFT HIP: ICD-10-CM

## 2018-10-18 DIAGNOSIS — Z00.8 ENCOUNTER FOR WORK CAPABILITY ASSESSMENT: ICD-10-CM

## 2018-10-18 DIAGNOSIS — M51.36 DDD (DEGENERATIVE DISC DISEASE), LUMBAR: ICD-10-CM

## 2018-10-18 DIAGNOSIS — M54.41 CHRONIC BILATERAL LOW BACK PAIN WITH BILATERAL SCIATICA: ICD-10-CM

## 2018-10-18 DIAGNOSIS — M50.30 DDD (DEGENERATIVE DISC DISEASE), CERVICAL: ICD-10-CM

## 2018-10-18 DIAGNOSIS — J45.20 MILD INTERMITTENT ASTHMA WITHOUT COMPLICATION: ICD-10-CM

## 2018-10-18 DIAGNOSIS — E66.9 OBESITY (BMI 30-39.9): ICD-10-CM

## 2018-10-18 PROCEDURE — 7101 PR PHYSICAL: Performed by: INTERNAL MEDICINE

## 2018-10-18 PROCEDURE — 99215 OFFICE O/P EST HI 40 MIN: CPT | Mod: 25 | Performed by: INTERNAL MEDICINE

## 2018-10-18 ASSESSMENT — ENCOUNTER SYMPTOMS
MUSCULOSKELETAL NEGATIVE: 1
CONSTITUTIONAL NEGATIVE: 1
EYES NEGATIVE: 1
RESPIRATORY NEGATIVE: 1
GASTROINTESTINAL NEGATIVE: 1
NEUROLOGICAL NEGATIVE: 1
CARDIOVASCULAR NEGATIVE: 1
PSYCHIATRIC NEGATIVE: 1

## 2018-10-19 NOTE — TELEPHONE ENCOUNTER
Please contact patient to let her know that her FMLA paperwork was updated today by me.  And that we will fax the new paperwork to Emily Syed as requested, at human resources department at the division of welfare and supportive services in [place] at fax number [phone].    Please scan the form ([id number] certification of healthcare provider) into media.

## 2018-10-19 NOTE — PROGRESS NOTES
Subjective:      Karine Ovalle is a 53 y.o. female who presents with Other (fv )  Is here primarily for disability paperwork to be filled out.  Is because of her frequent absences from work.  And this is because of her chronic neck and low back pain and left hip pain.  Currently patient has been provided with paperwork substantiating her need to miss work up to 16 hours or 2 days/week.    She is scheduled to have cervical laminectomy done for her chronic neck pain on 12/5/2018 by Dr. Mendoza of the neurosurgery service.  She will remain on this limited work schedule missing up to 2 days a week until then.  After the neck surgery she will be off work completely for the following 4 weeks.  Then she will return to her current work schedule full-time with expected absences up to twice a week or 16 hours a week.    She will continue on the schedule for another month until she will be considered for left hip replacement by Dr. Levine.  She will need surgical clearance during that time.  By me.  Following her hips replacement surgery she should be able to return to work after another 4 weeks out of work.  She will return to work on a limited basis after that missing up to 2 days a week as before for another month and then return full time.    Most the patient's pain is confined to her left hip but the replacement on the left hip cannot be done until her neck problem is resolved,  and cervical laminectomy is completed.          AND  The patient is here for followup of chronic medical problems listed below. The patient is compliant with medications and having no side effects from them. Denies chest pain, abdominal pain, dyspnea, myalgias, or cough.   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 08/29/2018   • DDD (degenerative disc disease),  cervical- MOD-SEVERE SPINE NV- dr brennan; needs laminectomy 12/5/2018 dr brennan 08/09/2018   • Primary osteoarthritis of left hip- dr nowak; left THR tbd feb, 2019 06/07/2018   • Obesity (BMI 30-39.9) 06/07/2018   • DDD (degenerative disc disease), lumbar 04/25/2018   • Uncomplicated opioid dependence (CMS-HCC)- kylah adkins 01/31/2018   • Essential hypertension 01/31/2018   • Venous insufficiency 10/03/2017   • Colon cancer screening- needs 08/09/2017   • Chronic bilateral low back pain with bilateral sciatica- pain mgt 07/26/2017   • Vitamin B 12 deficiency 06/02/2016   • Fibroids 08/13/2013     Allergies   Allergen Reactions   • Zofran [Dextrose-Ondansetron] Nausea     Outpatient Medications Prior to Visit   Medication Sig Dispense Refill   • phentermine 37.5 MG capsule TAKE 1 CAPSULE BY MOUTH ONCE DAILY IN THE MORNING FOR  90  DAYS  *E66.9* 30 Cap 2   • phentermine 30 MG capsule Take 1 Cap by mouth every morning for 30 days. 30 Cap 0   • celecoxib (CELEBREX) 200 MG Cap Take 1-2 Caps by mouth 1 time daily as needed for Moderate Pain. 12 Cap 0   • ondansetron (ZOFRAN) 4 MG Tab tablet Take 1 Tab by mouth every four hours as needed for Nausea/Vomiting. 24 Tab 0   • MethylPREDNISolone (MEDROL DOSEPAK) 4 MG Tablet Therapy Pack Take 1 Tab by mouth every day. 1 Kit 0   • metaxalone (SKELAXIN) 800 MG Tab Take 1 Tab by mouth 3 times a day. 60 Tab 0   • ketorolac (TORADOL) 10 MG Tab Take 1 Tab by mouth every 6 hours as needed. 20 Tab 0   • metaxalone (SKELAXIN) 800 MG Tab      • olopatadine (PATANOL) 0.1 % ophthalmic solution Place 1 Drop in both eyes 2 times a day. 5 mL 11   • furosemide (LASIX) 40 MG Tab Take 1 Tab by mouth every day. 90 Tab 3   • potassium chloride SA (KDUR) 20 MEQ Tab CR Take 1 Tab by mouth every day. 90 Tab 4   • omeprazole (PRILOSEC) 20 MG delayed-release capsule Take 1 Cap by mouth every day. 30 Cap 0   • cyanocobalamin (VITAMIN B12) 1000 MCG Tab Take 1 Tab by mouth every day. 90 Tab 4   •  "multivitamin (THERAGRAN) Tab Take 1 Tab by mouth every day.     • vitamin D (CHOLECALCIFEROL) 1000 UNIT Tab Take 1,000 Units by mouth every day.       No facility-administered medications prior to visit.                Other         Review of Systems   Constitutional: Negative.    HENT: Negative.    Eyes: Negative.    Respiratory: Negative.    Cardiovascular: Negative.    Gastrointestinal: Negative.    Genitourinary: Negative.    Musculoskeletal: Negative.    Skin: Negative.    Neurological: Negative.    Endo/Heme/Allergies: Negative.    Psychiatric/Behavioral: Negative.           Objective:     /96 (BP Location: Right arm, Patient Position: Sitting, BP Cuff Size: Adult)   Pulse (!) 108   Temp 36.7 °C (98 °F) (Temporal)   Resp 12   Ht 1.6 m (5' 3\")   Wt 88.7 kg (195 lb 9.6 oz)   LMP 05/17/2017   SpO2 97%   BMI 34.65 kg/m²      Physical Exam   Constitutional: She is oriented to person, place, and time. She appears well-developed and well-nourished. No distress.   HENT:   Head: Normocephalic and atraumatic.   Right Ear: External ear normal.   Left Ear: External ear normal.   Nose: Nose normal.   Mouth/Throat: Oropharynx is clear and moist. No oropharyngeal exudate.   Eyes: Pupils are equal, round, and reactive to light. Conjunctivae and EOM are normal. Right eye exhibits no discharge. Left eye exhibits no discharge. No scleral icterus.   Neck: Normal range of motion. Neck supple. No JVD present. No tracheal deviation present. No thyromegaly present.   Cardiovascular: Normal rate, regular rhythm, normal heart sounds and intact distal pulses.  Exam reveals no gallop and no friction rub.    No murmur heard.  Pulmonary/Chest: Effort normal and breath sounds normal. No stridor. No respiratory distress. She has no wheezes. She has no rales. She exhibits no tenderness.   Abdominal: Soft. Bowel sounds are normal. She exhibits no distension and no mass. There is no tenderness. There is no rebound and no guarding. "   Musculoskeletal: Normal range of motion. She exhibits tenderness. She exhibits no edema.   There is severe tenderness of the entire cervical spine.  The neurological exam of the upper extremities however remains unremarkable.  Her left hip has severely reduced range of motion and diffuse tenderness.  The LS spine is minimally tender.  Status post lumbar laminectomy.   Lymphadenopathy:     She has no cervical adenopathy.   Neurological: She is alert and oriented to person, place, and time. She has normal reflexes. She displays normal reflexes. No cranial nerve deficit. She exhibits normal muscle tone. Coordination normal.   Skin: Skin is warm and dry. No rash noted. She is not diaphoretic. No erythema. No pallor.   Psychiatric: She has a normal mood and affect. Her behavior is normal. Judgment and thought content normal.   Vitals reviewed.    No visits with results within 1 Month(s) from this visit.   Latest known visit with results is:   Hospital Outpatient Visit on 07/06/2018   Component Date Value   • Sodium 07/06/2018 140    • Potassium 07/06/2018 3.7    • Chloride 07/06/2018 104    • Co2 07/06/2018 25    • Glucose 07/06/2018 64*   • Bun 07/06/2018 13    • Creatinine 07/06/2018 0.76    • Calcium 07/06/2018 9.4    • Anion Gap 07/06/2018 11.0    • GFR If  07/06/2018 >60    • GFR If Non  [employer]* 07/06/2018 >60       Lab Results   Component Value Date/Time    HBA1C 5.3 06/12/2017 09:03 AM     Lab Results   Component Value Date/Time    SODIUM 140 07/06/2018 11:41 AM    POTASSIUM 3.7 07/06/2018 11:41 AM    CHLORIDE 104 07/06/2018 11:41 AM    CO2 25 07/06/2018 11:41 AM    GLUCOSE 64 (L) 07/06/2018 11:41 AM    BUN 13 07/06/2018 11:41 AM    CREATININE 0.76 07/06/2018 11:41 AM    CREATININE 0.88 08/14/2010 12:00 AM    BUNCREATRAT 13 08/14/2010 12:00 AM    GLOMRATE >59 08/14/2010 12:00 AM    ALKPHOSPHAT 90 02/22/2018 05:05 PM    ASTSGOT 16 02/22/2018 05:05 PM    ALTSGPT 8 02/22/2018 05:05 PM     TBILIRUBIN 0.4 02/22/2018 05:05 PM     Lab Results   Component Value Date/Time    INR 1.04 05/18/2012 12:20 PM     Lab Results   Component Value Date/Time    CHOLSTRLTOT 168 02/22/2018 05:05 PM    LDL 84 02/22/2018 05:05 PM    HDL 67 02/22/2018 05:05 PM    TRIGLYCERIDE 85 02/22/2018 05:05 PM       No results found for: TESTOSTERONE  Lab Results   Component Value Date/Time    TSH 2.110 08/14/2010 12:00 AM     Lab Results   Component Value Date/Time    FREET4 0.72 05/03/2016 07:32 AM     Lab Results   Component Value Date/Time    URICACID 5.6 08/12/2014 04:49 PM     No components found for: VITB12  Lab Results   Component Value Date/Time    25HYDROXY 33 08/12/2014 04:49 PM    25HYDROXY 25 (L) 07/03/2012 09:20 AM                 Assessment/Plan:     1. Encounter for work capability assessment- FMLA PAPERWORK  Papers for patient's employer will be filled out over the next 24 hours and return to her employer.  We will document her reduced ability to work as outlined above.    2. DDD (degenerative disc disease), cervical- MOD-SEVERE SPINE NV- dr brennan; needs laminectomy 12/5/2018 dr brennan  As above.    3. Primary osteoarthritis of left hip- dr nowak; left THR tbd feb, 2019  As above.    4. Chronic bilateral low back pain with bilateral sciatica- pain mgt  As above.  Continue with pain management.    5. Obesity (BMI 30-39.9)    diet/exercise/lose 15 lbs.; patient counseled      6. Mild intermittent asthma without complication   Under good control. Continue same regimen.    7. DDD (degenerative disc disease), lumbar      Under good control. Continue same regimen.      40 minute face-to-face encounter took place today.  More than half of this time was spent in the coordination of care of the above problems, as well as counseling.

## 2018-10-23 ENCOUNTER — OFFICE VISIT (OUTPATIENT)
Dept: URGENT CARE | Facility: CLINIC | Age: 53
End: 2018-10-23
Payer: COMMERCIAL

## 2018-10-23 VITALS
HEIGHT: 63 IN | BODY MASS INDEX: 34.55 KG/M2 | OXYGEN SATURATION: 100 % | HEART RATE: 106 BPM | TEMPERATURE: 99.3 F | WEIGHT: 195 LBS

## 2018-10-23 DIAGNOSIS — M25.552 CHRONIC LEFT HIP PAIN: ICD-10-CM

## 2018-10-23 DIAGNOSIS — G89.29 CHRONIC LEFT HIP PAIN: ICD-10-CM

## 2018-10-23 DIAGNOSIS — M16.12 PRIMARY OSTEOARTHRITIS OF LEFT HIP: ICD-10-CM

## 2018-10-23 PROCEDURE — 99214 OFFICE O/P EST MOD 30 MIN: CPT | Mod: 25 | Performed by: PHYSICIAN ASSISTANT

## 2018-10-23 RX ORDER — KETOROLAC TROMETHAMINE 30 MG/ML
60 INJECTION, SOLUTION INTRAMUSCULAR; INTRAVENOUS ONCE
Status: COMPLETED | OUTPATIENT
Start: 2018-10-23 | End: 2018-10-23

## 2018-10-23 RX ADMIN — KETOROLAC TROMETHAMINE 60 MG: 30 INJECTION, SOLUTION INTRAMUSCULAR; INTRAVENOUS at 12:56

## 2018-10-23 ASSESSMENT — ENCOUNTER SYMPTOMS
GASTROINTESTINAL NEGATIVE: 1
WEAKNESS: 0
CONSTITUTIONAL NEGATIVE: 1
FEVER: 0
HIP PAIN: 1
RESPIRATORY NEGATIVE: 1
BACK PAIN: 0
NUMBNESS: 0
NEUROLOGICAL NEGATIVE: 1
FALLS: 0
CHILLS: 0
CHANGE IN BOWEL HABIT: 0
MYALGIAS: 0
CARDIOVASCULAR NEGATIVE: 1
NECK PAIN: 0

## 2018-10-23 NOTE — PROGRESS NOTES
Subjective:      Karine Ovalle is a 53 y.o. female who presents with Hip Pain (pain in left hip. wants toradol inj. )            Hip Pain   This is a recurrent problem. The current episode started more than 1 month ago. The problem occurs constantly. The problem has been unchanged. Pertinent negatives include no change in bowel habit, chest pain, chills, fever, myalgias, neck pain, numbness, urinary symptoms or weakness. The symptoms are aggravated by walking and standing.   Acute on chronic hip pain, left side. Severe osteoarthritis on that side. Requesting Toradol. She is due for a hip replacement in the next several months.      PMH:  has a past medical history of Arthritis; Chronic back pain; Dental disorder; Pain (03/07/2018); and Urinary incontinence. She also has no past medical history of ASTHMA; Breast cancer (HCC); or Diabetes.  MEDS:   Current Outpatient Prescriptions:   •  phentermine 37.5 MG capsule, TAKE 1 CAPSULE BY MOUTH ONCE DAILY IN THE MORNING FOR  90  DAYS  *E66.9*, Disp: 30 Cap, Rfl: 2  •  phentermine 30 MG capsule, Take 1 Cap by mouth every morning for 30 days., Disp: 30 Cap, Rfl: 0  •  celecoxib (CELEBREX) 200 MG Cap, Take 1-2 Caps by mouth 1 time daily as needed for Moderate Pain., Disp: 12 Cap, Rfl: 0  •  ondansetron (ZOFRAN) 4 MG Tab tablet, Take 1 Tab by mouth every four hours as needed for Nausea/Vomiting., Disp: 24 Tab, Rfl: 0  •  MethylPREDNISolone (MEDROL DOSEPAK) 4 MG Tablet Therapy Pack, Take 1 Tab by mouth every day., Disp: 1 Kit, Rfl: 0  •  metaxalone (SKELAXIN) 800 MG Tab, Take 1 Tab by mouth 3 times a day., Disp: 60 Tab, Rfl: 0  •  ketorolac (TORADOL) 10 MG Tab, Take 1 Tab by mouth every 6 hours as needed., Disp: 20 Tab, Rfl: 0  •  metaxalone (SKELAXIN) 800 MG Tab, , Disp: , Rfl:   •  olopatadine (PATANOL) 0.1 % ophthalmic solution, Place 1 Drop in both eyes 2 times a day., Disp: 5 mL, Rfl: 11  •  furosemide (LASIX) 40 MG Tab, Take 1 Tab by mouth every day., Disp: 90 Tab,  "Rfl: 3  •  potassium chloride SA (KDUR) 20 MEQ Tab CR, Take 1 Tab by mouth every day., Disp: 90 Tab, Rfl: 4  •  omeprazole (PRILOSEC) 20 MG delayed-release capsule, Take 1 Cap by mouth every day., Disp: 30 Cap, Rfl: 0  •  cyanocobalamin (VITAMIN B12) 1000 MCG Tab, Take 1 Tab by mouth every day., Disp: 90 Tab, Rfl: 4  •  multivitamin (THERAGRAN) Tab, Take 1 Tab by mouth every day., Disp: , Rfl:   •  vitamin D (CHOLECALCIFEROL) 1000 UNIT Tab, Take 1,000 Units by mouth every day., Disp: , Rfl:   ALLERGIES:   Allergies   Allergen Reactions   • Zofran [Dextrose-Ondansetron] Nausea     SURGHX:   Past Surgical History:   Procedure Laterality Date   • HIP ARTHROPLASTY TOTAL Right 3/20/2018    Procedure: HIP ARTHROPLASTY TOTAL;  Surgeon: Bryon Levine M.D.;  Location: SUR[facility]ERY [facility];  Service: Orthopedics   • KNEE ARTHROPLASTY TOTAL Right 10/20/2015    Procedure: KNEE ARTHROPLASTY TOTAL;  Surgeon: Bryon Levine M.D.;  Location: SURGERY [facility];  Service:    • APPENDECTOMY LAPAROSCOPIC  3/21/2013    Performed by Dali Queen M.D. at SURGERY [facility]   • DEBRIDEMENT  5/17/2012    Performed by BRADLEY DYKES at SURGERY [facility]   • PLASTIC SURGERY  2004    excess skin on arms and legs removed   • MAMMOPLASTY AUGMENTATION Bilateral 2004    breast implants and \"tummy tuck\"   • GASTRIC BYPASS LAPAROSCOPIC  2002    x 2   • ABDOMINAL EXPLORATION       SOCHX:  reports that she has never smoked. She has never used smokeless tobacco. She reports that she drinks alcohol. She reports that she does not use drugs.  FH: family history includes Diabetes in her mother; Heart Disease in her mother.      Review of Systems   Constitutional: Negative.  Negative for chills and fever.   Respiratory: Negative.    Cardiovascular: Negative.  Negative for chest pain.   Gastrointestinal: Negative.  Negative for change in bowel habit.   Musculoskeletal: Positive for joint pain. Negative for back pain, falls, " "myalgias and neck pain.   Neurological: Negative.  Negative for weakness and numbness.       Medications, Allergies, and current problem list reviewed today in Epic     Objective:     Pulse (!) 106   Temp 37.4 °C (99.3 °F) (Temporal)   Ht 1.6 m (5' 3\")   Wt 88.5 kg (195 lb)   LMP 05/17/2017   SpO2 100%   BMI 34.54 kg/m²      Physical Exam   Constitutional: She is oriented to person, place, and time. She appears well-developed and well-nourished. No distress.   HENT:   Head: Normocephalic and atraumatic.   Eyes: Conjunctivae and EOM are normal.   Neck: Normal range of motion. Neck supple.   Cardiovascular: Normal rate, regular rhythm and normal heart sounds.    Pulmonary/Chest: Effort normal and breath sounds normal. No respiratory distress. She has no wheezes.   Musculoskeletal:        Left hip: She exhibits decreased range of motion, decreased strength, tenderness and bony tenderness. She exhibits no swelling, no crepitus and no deformity.   Neurological: She is alert and oriented to person, place, and time.   Skin: Skin is warm and dry. She is not diaphoretic.   Psychiatric: She has a normal mood and affect. Her behavior is normal. Judgment and thought content normal.   Nursing note and vitals reviewed.              Assessment/Plan:     1. Chronic left hip pain  ketorolac (TORADOL) injection 60 mg   2. Primary osteoarthritis of left hip  ketorolac (TORADOL) injection 60 mg     No acute changes., Not tolerating pain well.  Toradol injection provided, monitored for 15 minutes, no adverse reaction  Follow-up with ortho  OTC meds and conservative measures as discussed  Return to clinic or go to ED if symptoms worsen or persist. Indications for ED discussed at length. Patient voices understanding. Follow-up with your primary care provider in 3-5 days. Red flags discussed. All side effects of medication discussed including allergic response, GI upset, tendon injury, etc.    Please note that this dictation was " created using voice recognition software. I have made every reasonable attempt to correct obvious errors, but I expect that there are errors of grammar and possibly content that I did not discover before finalizing the note.

## 2018-10-25 ENCOUNTER — OFFICE VISIT (OUTPATIENT)
Dept: URGENT CARE | Facility: CLINIC | Age: 53
End: 2018-10-25
Payer: COMMERCIAL

## 2018-10-25 VITALS
OXYGEN SATURATION: 95 % | RESPIRATION RATE: 20 BRPM | BODY MASS INDEX: 34.55 KG/M2 | TEMPERATURE: 97.9 F | HEART RATE: 100 BPM | SYSTOLIC BLOOD PRESSURE: 130 MMHG | DIASTOLIC BLOOD PRESSURE: 85 MMHG | HEIGHT: 63 IN | WEIGHT: 195 LBS

## 2018-10-25 DIAGNOSIS — R60.0 BILATERAL LOWER EXTREMITY EDEMA: ICD-10-CM

## 2018-10-25 PROCEDURE — 99214 OFFICE O/P EST MOD 30 MIN: CPT | Performed by: PHYSICIAN ASSISTANT

## 2018-10-25 RX ORDER — FUROSEMIDE 40 MG/1
40 TABLET ORAL DAILY
Qty: 30 TAB | Refills: 0 | Status: ON HOLD | OUTPATIENT
Start: 2018-10-25 | End: 2018-12-26

## 2018-10-25 NOTE — LETTER
October 25, 2018         Patient: Karine Ovalle   YOB: 1965   Date of Visit: 10/25/2018           To Whom it May Concern:    Karine Ovalle was seen in my clinic on 10/25/2018. Please excuse her absence today.     If you have any questions or concerns, please don't hesitate to call.        Sincerely,           Erin Streeter P.A.-C.  Electronically Signed

## 2018-10-25 NOTE — LETTER
October 25, 2018         Patient: Karine Ovalle   YOB: 1965   Date of Visit: 10/25/2018           To Whom it May Concern:    Karine Ovalle was seen in my clinic on 10/25/2018. Please excuse her early absence today, 10/25/18.    If you have any questions or concerns, please don't hesitate to call.        Sincerely,           Erin Streeter P.A.-C.  Electronically Signed

## 2018-10-29 ASSESSMENT — ENCOUNTER SYMPTOMS
MUSCULOSKELETAL NEGATIVE: 1
SPUTUM PRODUCTION: 0
DIZZINESS: 0
JOINT SWELLING: 0
PND: 0
CHILLS: 0
NUMBNESS: 0
MYALGIAS: 0
COUGH: 0
VOMITING: 0
NAUSEA: 0
SHORTNESS OF BREATH: 0
DIARRHEA: 0
ABDOMINAL PAIN: 0
EDEMA: 1
ORTHOPNEA: 0
FEVER: 0

## 2018-10-29 NOTE — PROGRESS NOTES
"Subjective:      Karine Ovalle is a 53 y.o. female who presents with Edema (Right ankle and leg swollen )            Edema   This is a chronic problem. The current episode started more than 1 month ago (2 months). The problem occurs constantly. The problem has been unchanged. Pertinent negatives include no abdominal pain, chest pain, chills, congestion, coughing, fever, joint swelling, myalgias, nausea, numbness, rash or vomiting. The symptoms are aggravated by standing and walking. She has tried nothing for the symptoms.     Patient reports she was previously taking lasix 40 mg daily for peripheral edea. She ran out of the medication about 2 months ago and reports worsening bilateral lower extremity since that time. She denies recent surgeries, prolonged periods of immobilization, or history of blood clots. No fevers, chills, body aches, chest pain, or SOB.     Review of Systems   Constitutional: Negative for chills and fever.   HENT: Negative for congestion.    Respiratory: Negative for cough, sputum production and shortness of breath.    Cardiovascular: Positive for leg swelling. Negative for chest pain, orthopnea and PND.   Gastrointestinal: Negative for abdominal pain, diarrhea, nausea and vomiting.   Genitourinary: Negative.    Musculoskeletal: Negative.  Negative for joint swelling and myalgias.   Skin: Negative for rash.   Neurological: Negative for dizziness and numbness.          Objective:     /85 (BP Location: Right arm, Patient Position: Sitting, BP Cuff Size: Large adult)   Pulse 100   Temp 36.6 °C (97.9 °F) (Temporal)   Resp 20   Ht 1.6 m (5' 3\")   Wt 88.5 kg (195 lb)   LMP 05/17/2017   SpO2 95%   BMI 34.54 kg/m²      Physical Exam   Constitutional: She is oriented to person, place, and time. She appears well-developed and well-nourished. No distress.   HENT:   Head: Normocephalic and atraumatic.   Eyes: Pupils are equal, round, and reactive to light. Conjunctivae are normal. "   Neck: Normal range of motion.   Cardiovascular: Normal rate, regular rhythm and normal heart sounds.    No murmur heard.  Pulmonary/Chest: Effort normal and breath sounds normal. No respiratory distress. She has no wheezes.   Musculoskeletal: Normal range of motion.   Lower extremity pitting edema present bilateral, right slightly worse than left. Jm's negative bilaterally. No erythema or warmth to touch.    Neurological: She is alert and oriented to person, place, and time.   Skin: Skin is warm and dry. She is not diaphoretic.   Psychiatric: She has a normal mood and affect. Her behavior is normal.   Nursing note and vitals reviewed.         PMH:  has a past medical history of Arthritis; Chronic back pain; Dental disorder; Pain (03/07/2018); and Urinary incontinence. She also has no past medical history of ASTHMA; Breast cancer (HCC); or Diabetes.  MEDS:   Current Outpatient Prescriptions:   •  furosemide (LASIX) 40 MG Tab, Take 1 Tab by mouth every day., Disp: 30 Tab, Rfl: 0  •  phentermine 37.5 MG capsule, TAKE 1 CAPSULE BY MOUTH ONCE DAILY IN THE MORNING FOR  90  DAYS  *E66.9*, Disp: 30 Cap, Rfl: 2  •  phentermine 30 MG capsule, Take 1 Cap by mouth every morning for 30 days., Disp: 30 Cap, Rfl: 0  •  celecoxib (CELEBREX) 200 MG Cap, Take 1-2 Caps by mouth 1 time daily as needed for Moderate Pain., Disp: 12 Cap, Rfl: 0  •  ondansetron (ZOFRAN) 4 MG Tab tablet, Take 1 Tab by mouth every four hours as needed for Nausea/Vomiting., Disp: 24 Tab, Rfl: 0  •  MethylPREDNISolone (MEDROL DOSEPAK) 4 MG Tablet Therapy Pack, Take 1 Tab by mouth every day. (Patient not taking: Reported on 10/25/2018), Disp: 1 Kit, Rfl: 0  •  metaxalone (SKELAXIN) 800 MG Tab, Take 1 Tab by mouth 3 times a day., Disp: 60 Tab, Rfl: 0  •  ketorolac (TORADOL) 10 MG Tab, Take 1 Tab by mouth every 6 hours as needed., Disp: 20 Tab, Rfl: 0  •  metaxalone (SKELAXIN) 800 MG Tab, , Disp: , Rfl:   •  olopatadine (PATANOL) 0.1 % ophthalmic solution,  "Place 1 Drop in both eyes 2 times a day., Disp: 5 mL, Rfl: 11  •  potassium chloride SA (KDUR) 20 MEQ Tab CR, Take 1 Tab by mouth every day., Disp: 90 Tab, Rfl: 4  •  omeprazole (PRILOSEC) 20 MG delayed-release capsule, Take 1 Cap by mouth every day., Disp: 30 Cap, Rfl: 0  •  cyanocobalamin (VITAMIN B12) 1000 MCG Tab, Take 1 Tab by mouth every day., Disp: 90 Tab, Rfl: 4  •  multivitamin (THERAGRAN) Tab, Take 1 Tab by mouth every day., Disp: , Rfl:   •  vitamin D (CHOLECALCIFEROL) 1000 UNIT Tab, Take 1,000 Units by mouth every day., Disp: , Rfl:   ALLERGIES:   Allergies   Allergen Reactions   • Zofran [Dextrose-Ondansetron] Nausea     SURGHX:   Past Surgical History:   Procedure Laterality Date   • HIP ARTHROPLASTY TOTAL Right 3/20/2018    Procedure: HIP ARTHROPLASTY TOTAL;  Surgeon: Bryon Levine M.D.;  Location: SURGERY [facility];  Service: Orthopedics   • KNEE ARTHROPLASTY TOTAL Right 10/20/2015    Procedure: KNEE ARTHROPLASTY TOTAL;  Surgeon: Bryon Levine M.D.;  Location: SURGERY [facility];  Service:    • APPENDECTOMY LAPAROSCOPIC  3/21/2013    Performed by Dali Queen M.D. at [facility]   • DEBRIDEMENT  5/17/2012    Performed by BRADLEY DYKES at SURGERY [facility] ORS   • PLASTIC SURGERY  2004    excess skin on arms and legs removed   • MAMMOPLASTY AUGMENTATION Bilateral 2004    breast implants and \"tummy tuck\"   • GASTRIC BYPASS LAPAROSCOPIC  2002    x 2   • ABDOMINAL EXPLORATION       SOCHX:  reports that she has never smoked. She has never used smokeless tobacco. She reports that she drinks alcohol. She reports that she does not use drugs.  FH: family history includes Diabetes in her mother; Heart Disease in her mother.       Assessment/Plan:     1. Bilateral lower extremity edema  - furosemide (LASIX) 40 MG Tab; Take 1 Tab by mouth every day.  Dispense: 30 Tab; Refill: 0    Encouraged compress stockings, elevation of the legs 2-3 times daily, and salt restriction. She " should follow up with her PCP in 2-4 weeks. ED precautions given for any new/worsening symptoms. The patient demonstrated a good understanding and agreed with the treatment plan.

## 2018-11-01 ENCOUNTER — TELEPHONE (OUTPATIENT)
Dept: MEDICAL GROUP | Age: 53
End: 2018-11-01

## 2018-11-28 ENCOUNTER — HOSPITAL ENCOUNTER (OUTPATIENT)
Facility: MEDICAL CENTER | Age: 53
End: 2018-11-28
Attending: NEUROLOGICAL SURGERY | Admitting: NEUROLOGICAL SURGERY
Payer: COMMERCIAL

## 2018-11-29 ENCOUNTER — TELEPHONE (OUTPATIENT)
Dept: MEDICAL GROUP | Age: 53
End: 2018-11-29

## 2018-12-04 ENCOUNTER — HOSPITAL ENCOUNTER (OUTPATIENT)
Dept: RADIOLOGY | Facility: MEDICAL CENTER | Age: 53
DRG: 454 | End: 2018-12-04
Attending: NEUROLOGICAL SURGERY | Admitting: NEUROLOGICAL SURGERY
Payer: COMMERCIAL

## 2018-12-04 DIAGNOSIS — Z01.810 PRE-OPERATIVE CARDIOVASCULAR EXAMINATION: ICD-10-CM

## 2018-12-04 DIAGNOSIS — Z01.812 PRE-OPERATIVE LABORATORY EXAMINATION: ICD-10-CM

## 2018-12-04 DIAGNOSIS — Z01.811 PRE-OPERATIVE RESPIRATORY EXAMINATION: ICD-10-CM

## 2018-12-04 LAB
ANION GAP SERPL CALC-SCNC: 9 MMOL/L (ref 0–11.9)
APPEARANCE UR: ABNORMAL
APTT PPP: 31.2 SEC (ref 24.7–36)
BACTERIA #/AREA URNS HPF: ABNORMAL /HPF
BASOPHILS # BLD AUTO: 0.6 % (ref 0–1.8)
BASOPHILS # BLD: 0.05 K/UL (ref 0–0.12)
BILIRUB UR QL STRIP.AUTO: NEGATIVE
BUN SERPL-MCNC: 12 MG/DL (ref 8–22)
CALCIUM SERPL-MCNC: 9.6 MG/DL (ref 8.5–10.5)
CHLORIDE SERPL-SCNC: 106 MMOL/L (ref 96–112)
CO2 SERPL-SCNC: 24 MMOL/L (ref 20–33)
COLOR UR: YELLOW
CREAT SERPL-MCNC: 0.61 MG/DL (ref 0.5–1.4)
EKG IMPRESSION: NORMAL
EOSINOPHIL # BLD AUTO: 0.42 K/UL (ref 0–0.51)
EOSINOPHIL NFR BLD: 5 % (ref 0–6.9)
EPI CELLS #/AREA URNS HPF: NEGATIVE /HPF
ERYTHROCYTE [DISTWIDTH] IN BLOOD BY AUTOMATED COUNT: 49.4 FL (ref 35.9–50)
GLUCOSE SERPL-MCNC: 86 MG/DL (ref 65–99)
GLUCOSE UR STRIP.AUTO-MCNC: NEGATIVE MG/DL
HCT VFR BLD AUTO: 37.6 % (ref 37–47)
HGB BLD-MCNC: 12.7 G/DL (ref 12–16)
HYALINE CASTS #/AREA URNS LPF: ABNORMAL /LPF
IMM GRANULOCYTES # BLD AUTO: 0.02 K/UL (ref 0–0.11)
IMM GRANULOCYTES NFR BLD AUTO: 0.2 % (ref 0–0.9)
INR PPP: 1.01 (ref 0.87–1.13)
KETONES UR STRIP.AUTO-MCNC: NEGATIVE MG/DL
LEUKOCYTE ESTERASE UR QL STRIP.AUTO: ABNORMAL
LYMPHOCYTES # BLD AUTO: 1.95 K/UL (ref 1–4.8)
LYMPHOCYTES NFR BLD: 23.1 % (ref 22–41)
MCH RBC QN AUTO: 26.1 PG (ref 27–33)
MCHC RBC AUTO-ENTMCNC: 33.8 G/DL (ref 33.6–35)
MCV RBC AUTO: 77.2 FL (ref 81.4–97.8)
MICRO URNS: ABNORMAL
MONOCYTES # BLD AUTO: 0.67 K/UL (ref 0–0.85)
MONOCYTES NFR BLD AUTO: 7.9 % (ref 0–13.4)
NEUTROPHILS # BLD AUTO: 5.32 K/UL (ref 2–7.15)
NEUTROPHILS NFR BLD: 63.2 % (ref 44–72)
NITRITE UR QL STRIP.AUTO: POSITIVE
NRBC # BLD AUTO: 0 K/UL
NRBC BLD-RTO: 0 /100 WBC
PH UR STRIP.AUTO: 6.5 [PH]
PLATELET # BLD AUTO: 467 K/UL (ref 164–446)
PMV BLD AUTO: 9.6 FL (ref 9–12.9)
POTASSIUM SERPL-SCNC: 3.8 MMOL/L (ref 3.6–5.5)
PROT UR QL STRIP: NEGATIVE MG/DL
PROTHROMBIN TIME: 13.4 SEC (ref 12–14.6)
RBC # BLD AUTO: 4.87 M/UL (ref 4.2–5.4)
RBC # URNS HPF: ABNORMAL /HPF
RBC UR QL AUTO: ABNORMAL
SODIUM SERPL-SCNC: 139 MMOL/L (ref 135–145)
SP GR UR STRIP.AUTO: 1.02
UROBILINOGEN UR STRIP.AUTO-MCNC: 0.2 MG/DL
WBC # BLD AUTO: 8.4 K/UL (ref 4.8–10.8)
WBC #/AREA URNS HPF: ABNORMAL /HPF

## 2018-12-04 PROCEDURE — 87086 URINE CULTURE/COLONY COUNT: CPT

## 2018-12-04 PROCEDURE — 85610 PROTHROMBIN TIME: CPT

## 2018-12-04 PROCEDURE — 85730 THROMBOPLASTIN TIME PARTIAL: CPT

## 2018-12-04 PROCEDURE — 93005 ELECTROCARDIOGRAM TRACING: CPT

## 2018-12-04 PROCEDURE — 71045 X-RAY EXAM CHEST 1 VIEW: CPT

## 2018-12-04 PROCEDURE — 87077 CULTURE AEROBIC IDENTIFY: CPT

## 2018-12-04 PROCEDURE — 36415 COLL VENOUS BLD VENIPUNCTURE: CPT

## 2018-12-04 PROCEDURE — 87186 SC STD MICRODIL/AGAR DIL: CPT

## 2018-12-04 PROCEDURE — 80048 BASIC METABOLIC PNL TOTAL CA: CPT

## 2018-12-04 PROCEDURE — 93010 ELECTROCARDIOGRAM REPORT: CPT | Performed by: INTERNAL MEDICINE

## 2018-12-04 PROCEDURE — 81001 URINALYSIS AUTO W/SCOPE: CPT

## 2018-12-04 PROCEDURE — 85025 COMPLETE CBC W/AUTO DIFF WBC: CPT

## 2018-12-04 RX ORDER — OXYCODONE AND ACETAMINOPHEN 10; 325 MG/1; MG/1
1 TABLET ORAL EVERY 4 HOURS PRN
Status: ON HOLD | COMMUNITY
End: 2018-12-11

## 2018-12-04 RX ORDER — ACETAMINOPHEN 325 MG/1
650 TABLET ORAL PRN
Status: ON HOLD | COMMUNITY
End: 2018-12-05

## 2018-12-05 ENCOUNTER — APPOINTMENT (OUTPATIENT)
Dept: RADIOLOGY | Facility: MEDICAL CENTER | Age: 53
DRG: 454 | End: 2018-12-05
Attending: NEUROLOGICAL SURGERY
Payer: COMMERCIAL

## 2018-12-05 ENCOUNTER — HOSPITAL ENCOUNTER (INPATIENT)
Facility: MEDICAL CENTER | Age: 53
LOS: 6 days | DRG: 454 | End: 2018-12-11
Attending: NEUROLOGICAL SURGERY | Admitting: NEUROLOGICAL SURGERY
Payer: COMMERCIAL

## 2018-12-05 DIAGNOSIS — F11.20 UNCOMPLICATED OPIOID DEPENDENCE (HCC): ICD-10-CM

## 2018-12-05 DIAGNOSIS — M25.552 LEFT HIP PAIN: ICD-10-CM

## 2018-12-05 DIAGNOSIS — I10 ESSENTIAL HYPERTENSION: ICD-10-CM

## 2018-12-05 DIAGNOSIS — M50.30 DDD (DEGENERATIVE DISC DISEASE), CERVICAL: ICD-10-CM

## 2018-12-05 DIAGNOSIS — M51.36 DDD (DEGENERATIVE DISC DISEASE), LUMBAR: ICD-10-CM

## 2018-12-05 DIAGNOSIS — F41.9 ANXIETY: ICD-10-CM

## 2018-12-05 PROCEDURE — 160009 HCHG ANES TIME/MIN: Performed by: NEUROLOGICAL SURGERY

## 2018-12-05 PROCEDURE — 01N10ZZ RELEASE CERVICAL NERVE, OPEN APPROACH: ICD-10-PCS | Performed by: NEUROLOGICAL SURGERY

## 2018-12-05 PROCEDURE — 700102 HCHG RX REV CODE 250 W/ 637 OVERRIDE(OP): Performed by: ANESTHESIOLOGY

## 2018-12-05 PROCEDURE — 95861 NEEDLE EMG 2 EXTREMITIES: CPT | Performed by: NEUROLOGICAL SURGERY

## 2018-12-05 PROCEDURE — 500864 HCHG NEEDLE, SPINAL 18G: Performed by: NEUROLOGICAL SURGERY

## 2018-12-05 PROCEDURE — 160029 HCHG SURGERY MINUTES - 1ST 30 MINS LEVEL 4: Performed by: NEUROLOGICAL SURGERY

## 2018-12-05 PROCEDURE — A9270 NON-COVERED ITEM OR SERVICE: HCPCS | Performed by: ANESTHESIOLOGY

## 2018-12-05 PROCEDURE — 95925 SOMATOSENSORY TESTING: CPT | Performed by: NEUROLOGICAL SURGERY

## 2018-12-05 PROCEDURE — 770001 HCHG ROOM/CARE - MED/SURG/GYN PRIV*

## 2018-12-05 PROCEDURE — 95939 C MOTOR EVOKED UPR&LWR LIMBS: CPT | Performed by: NEUROLOGICAL SURGERY

## 2018-12-05 PROCEDURE — 160036 HCHG PACU - EA ADDL 30 MINS PHASE I: Performed by: NEUROLOGICAL SURGERY

## 2018-12-05 PROCEDURE — 500367 HCHG DRAIN KIT, HEMOVAC: Performed by: NEUROLOGICAL SURGERY

## 2018-12-05 PROCEDURE — 160035 HCHG PACU - 1ST 60 MINS PHASE I: Performed by: NEUROLOGICAL SURGERY

## 2018-12-05 PROCEDURE — 700102 HCHG RX REV CODE 250 W/ 637 OVERRIDE(OP): Performed by: PHYSICIAN ASSISTANT

## 2018-12-05 PROCEDURE — 700111 HCHG RX REV CODE 636 W/ 250 OVERRIDE (IP): Performed by: ANESTHESIOLOGY

## 2018-12-05 PROCEDURE — 160048 HCHG OR STATISTICAL LEVEL 1-5: Performed by: NEUROLOGICAL SURGERY

## 2018-12-05 PROCEDURE — A9270 NON-COVERED ITEM OR SERVICE: HCPCS | Performed by: PHYSICIAN ASSISTANT

## 2018-12-05 PROCEDURE — A6404 STERILE GAUZE > 48 SQ IN: HCPCS | Performed by: NEUROLOGICAL SURGERY

## 2018-12-05 PROCEDURE — 0RS104Z REPOSITION CERVICAL VERTEBRAL JOINT WITH INTERNAL FIXATION DEVICE, OPEN APPROACH: ICD-10-PCS | Performed by: NEUROLOGICAL SURGERY

## 2018-12-05 PROCEDURE — 700101 HCHG RX REV CODE 250

## 2018-12-05 PROCEDURE — 72040 X-RAY EXAM NECK SPINE 2-3 VW: CPT

## 2018-12-05 PROCEDURE — 700111 HCHG RX REV CODE 636 W/ 250 OVERRIDE (IP): Performed by: PHYSICIAN ASSISTANT

## 2018-12-05 PROCEDURE — 501838 HCHG SUTURE GENERAL: Performed by: NEUROLOGICAL SURGERY

## 2018-12-05 PROCEDURE — 95865 NEEDLE EMG LARYNX: CPT | Performed by: NEUROLOGICAL SURGERY

## 2018-12-05 PROCEDURE — 4A11X4G MONITORING OF PERIPHERAL NERVOUS ELECTRICAL ACTIVITY, INTRAOPERATIVE, EXTERNAL APPROACH: ICD-10-PCS | Performed by: NEUROLOGICAL SURGERY

## 2018-12-05 PROCEDURE — 0RG20A0 FUSION OF 2 OR MORE CERVICAL VERTEBRAL JOINTS WITH INTERBODY FUSION DEVICE, ANTERIOR APPROACH, ANTERIOR COLUMN, OPEN APPROACH: ICD-10-PCS | Performed by: NEUROLOGICAL SURGERY

## 2018-12-05 PROCEDURE — 160041 HCHG SURGERY MINUTES - EA ADDL 1 MIN LEVEL 4: Performed by: NEUROLOGICAL SURGERY

## 2018-12-05 PROCEDURE — 700111 HCHG RX REV CODE 636 W/ 250 OVERRIDE (IP)

## 2018-12-05 PROCEDURE — 00NW0ZZ RELEASE CERVICAL SPINAL CORD, OPEN APPROACH: ICD-10-PCS | Performed by: NEUROLOGICAL SURGERY

## 2018-12-05 PROCEDURE — 110454 HCHG SHELL REV 250: Performed by: NEUROLOGICAL SURGERY

## 2018-12-05 PROCEDURE — 502000 HCHG MISC OR IMPLANTS RC 0278: Performed by: NEUROLOGICAL SURGERY

## 2018-12-05 PROCEDURE — C1713 ANCHOR/SCREW BN/BN,TIS/BN: HCPCS | Performed by: NEUROLOGICAL SURGERY

## 2018-12-05 PROCEDURE — 95940 IONM IN OPERATNG ROOM 15 MIN: CPT | Performed by: NEUROLOGICAL SURGERY

## 2018-12-05 PROCEDURE — 700105 HCHG RX REV CODE 258: Performed by: PHYSICIAN ASSISTANT

## 2018-12-05 PROCEDURE — 95937 NEUROMUSCULAR JUNCTION TEST: CPT | Performed by: NEUROLOGICAL SURGERY

## 2018-12-05 PROCEDURE — 0RB30ZZ EXCISION OF CERVICAL VERTEBRAL DISC, OPEN APPROACH: ICD-10-PCS | Performed by: NEUROLOGICAL SURGERY

## 2018-12-05 PROCEDURE — 160002 HCHG RECOVERY MINUTES (STAT): Performed by: NEUROLOGICAL SURGERY

## 2018-12-05 PROCEDURE — 700112 HCHG RX REV CODE 229: Performed by: PHYSICIAN ASSISTANT

## 2018-12-05 RX ORDER — FUROSEMIDE 40 MG/1
40 TABLET ORAL DAILY
Status: DISCONTINUED | OUTPATIENT
Start: 2018-12-06 | End: 2018-12-11 | Stop reason: HOSPADM

## 2018-12-05 RX ORDER — OXYCODONE HYDROCHLORIDE 5 MG/1
5 TABLET ORAL
Status: DISCONTINUED | OUTPATIENT
Start: 2018-12-05 | End: 2018-12-11 | Stop reason: HOSPADM

## 2018-12-05 RX ORDER — CEFAZOLIN SODIUM 2 G/100ML
2 INJECTION, SOLUTION INTRAVENOUS EVERY 8 HOURS
Status: COMPLETED | OUTPATIENT
Start: 2018-12-05 | End: 2018-12-06

## 2018-12-05 RX ORDER — OXYCODONE HYDROCHLORIDE 5 MG/1
10 TABLET ORAL
Status: DISCONTINUED | OUTPATIENT
Start: 2018-12-05 | End: 2018-12-05 | Stop reason: HOSPADM

## 2018-12-05 RX ORDER — BISACODYL 10 MG
10 SUPPOSITORY, RECTAL RECTAL
Status: DISCONTINUED | OUTPATIENT
Start: 2018-12-05 | End: 2018-12-11 | Stop reason: HOSPADM

## 2018-12-05 RX ORDER — CIPROFLOXACIN 500 MG/1
500 TABLET, FILM COATED ORAL 2 TIMES DAILY
Status: ON HOLD | COMMUNITY
Start: 2018-12-04 | End: 2018-12-11

## 2018-12-05 RX ORDER — ONDANSETRON 2 MG/ML
4 INJECTION INTRAMUSCULAR; INTRAVENOUS EVERY 4 HOURS PRN
Status: DISCONTINUED | OUTPATIENT
Start: 2018-12-05 | End: 2018-12-11 | Stop reason: HOSPADM

## 2018-12-05 RX ORDER — BUPIVACAINE HYDROCHLORIDE AND EPINEPHRINE 5; 5 MG/ML; UG/ML
INJECTION, SOLUTION PERINEURAL
Status: DISCONTINUED | OUTPATIENT
Start: 2018-12-05 | End: 2018-12-05 | Stop reason: HOSPADM

## 2018-12-05 RX ORDER — ONDANSETRON 4 MG/1
4 TABLET, ORALLY DISINTEGRATING ORAL EVERY 4 HOURS PRN
Status: DISCONTINUED | OUTPATIENT
Start: 2018-12-05 | End: 2018-12-11 | Stop reason: HOSPADM

## 2018-12-05 RX ORDER — CELECOXIB 200 MG/1
200 CAPSULE ORAL ONCE
Status: COMPLETED | OUTPATIENT
Start: 2018-12-05 | End: 2018-12-05

## 2018-12-05 RX ORDER — PROMETHAZINE HYDROCHLORIDE 25 MG/1
12.5-25 SUPPOSITORY RECTAL EVERY 4 HOURS PRN
Status: DISCONTINUED | OUTPATIENT
Start: 2018-12-05 | End: 2018-12-11 | Stop reason: HOSPADM

## 2018-12-05 RX ORDER — DEXAMETHASONE SODIUM PHOSPHATE 4 MG/ML
4 INJECTION, SOLUTION INTRA-ARTICULAR; INTRALESIONAL; INTRAMUSCULAR; INTRAVENOUS; SOFT TISSUE EVERY 6 HOURS
Status: COMPLETED | OUTPATIENT
Start: 2018-12-05 | End: 2018-12-06

## 2018-12-05 RX ORDER — ACETAMINOPHEN 500 MG
1000 TABLET ORAL ONCE
Status: COMPLETED | OUTPATIENT
Start: 2018-12-05 | End: 2018-12-05

## 2018-12-05 RX ORDER — POTASSIUM CHLORIDE 20 MEQ/1
20 TABLET, EXTENDED RELEASE ORAL DAILY
Status: DISCONTINUED | OUTPATIENT
Start: 2018-12-06 | End: 2018-12-11 | Stop reason: HOSPADM

## 2018-12-05 RX ORDER — AMOXICILLIN 250 MG
1 CAPSULE ORAL NIGHTLY
Status: DISCONTINUED | OUTPATIENT
Start: 2018-12-05 | End: 2018-12-11 | Stop reason: HOSPADM

## 2018-12-05 RX ORDER — CHOLECALCIFEROL (VITAMIN D3) 125 MCG
1000 CAPSULE ORAL DAILY
Status: DISCONTINUED | OUTPATIENT
Start: 2018-12-06 | End: 2018-12-11 | Stop reason: HOSPADM

## 2018-12-05 RX ORDER — CALCIUM CARBONATE 500 MG/1
500 TABLET, CHEWABLE ORAL 2 TIMES DAILY
Status: DISCONTINUED | OUTPATIENT
Start: 2018-12-05 | End: 2018-12-11 | Stop reason: HOSPADM

## 2018-12-05 RX ORDER — PROMETHAZINE HYDROCHLORIDE 25 MG/1
12.5-25 TABLET ORAL EVERY 4 HOURS PRN
Status: DISCONTINUED | OUTPATIENT
Start: 2018-12-05 | End: 2018-12-11 | Stop reason: HOSPADM

## 2018-12-05 RX ORDER — ALPRAZOLAM 0.25 MG/1
0.25 TABLET ORAL 3 TIMES DAILY PRN
Status: DISCONTINUED | OUTPATIENT
Start: 2018-12-05 | End: 2018-12-07

## 2018-12-05 RX ORDER — ENEMA 19; 7 G/133ML; G/133ML
1 ENEMA RECTAL
Status: DISCONTINUED | OUTPATIENT
Start: 2018-12-05 | End: 2018-12-11 | Stop reason: HOSPADM

## 2018-12-05 RX ORDER — HYDROMORPHONE HYDROCHLORIDE 1 MG/ML
0.1 INJECTION, SOLUTION INTRAMUSCULAR; INTRAVENOUS; SUBCUTANEOUS
Status: DISCONTINUED | OUTPATIENT
Start: 2018-12-05 | End: 2018-12-05 | Stop reason: HOSPADM

## 2018-12-05 RX ORDER — SODIUM CHLORIDE, SODIUM LACTATE, POTASSIUM CHLORIDE, CALCIUM CHLORIDE 600; 310; 30; 20 MG/100ML; MG/100ML; MG/100ML; MG/100ML
INJECTION, SOLUTION INTRAVENOUS ONCE
Status: DISCONTINUED | OUTPATIENT
Start: 2018-12-05 | End: 2018-12-05 | Stop reason: HOSPADM

## 2018-12-05 RX ORDER — GABAPENTIN 300 MG/1
300 CAPSULE ORAL ONCE
Status: COMPLETED | OUTPATIENT
Start: 2018-12-05 | End: 2018-12-05

## 2018-12-05 RX ORDER — METAXALONE 800 MG/1
800 TABLET ORAL 4 TIMES DAILY
Status: DISCONTINUED | OUTPATIENT
Start: 2018-12-05 | End: 2018-12-09

## 2018-12-05 RX ORDER — OXYCODONE HCL 10 MG/1
10 TABLET, FILM COATED, EXTENDED RELEASE ORAL ONCE
Status: COMPLETED | OUTPATIENT
Start: 2018-12-05 | End: 2018-12-05

## 2018-12-05 RX ORDER — OXYCODONE HCL 5 MG/5 ML
10 SOLUTION, ORAL ORAL
Status: COMPLETED | OUTPATIENT
Start: 2018-12-05 | End: 2018-12-05

## 2018-12-05 RX ORDER — HALOPERIDOL 5 MG/ML
1 INJECTION INTRAMUSCULAR
Status: DISCONTINUED | OUTPATIENT
Start: 2018-12-05 | End: 2018-12-05 | Stop reason: HOSPADM

## 2018-12-05 RX ORDER — BACITRACIN 50000 [IU]/1
INJECTION, POWDER, FOR SOLUTION INTRAMUSCULAR
Status: DISCONTINUED | OUTPATIENT
Start: 2018-12-05 | End: 2018-12-05 | Stop reason: HOSPADM

## 2018-12-05 RX ORDER — MEPERIDINE HYDROCHLORIDE 25 MG/ML
12.5 INJECTION INTRAMUSCULAR; INTRAVENOUS; SUBCUTANEOUS
Status: DISCONTINUED | OUTPATIENT
Start: 2018-12-05 | End: 2018-12-05 | Stop reason: HOSPADM

## 2018-12-05 RX ORDER — DIPHENHYDRAMINE HCL 25 MG
25 TABLET ORAL EVERY 6 HOURS PRN
Status: DISCONTINUED | OUTPATIENT
Start: 2018-12-05 | End: 2018-12-07

## 2018-12-05 RX ORDER — MORPHINE SULFATE 30 MG/1
30 TABLET, FILM COATED, EXTENDED RELEASE ORAL EVERY 12 HOURS
Status: DISCONTINUED | OUTPATIENT
Start: 2018-12-05 | End: 2018-12-11 | Stop reason: HOSPADM

## 2018-12-05 RX ORDER — SODIUM CHLORIDE, SODIUM LACTATE, POTASSIUM CHLORIDE, CALCIUM CHLORIDE 600; 310; 30; 20 MG/100ML; MG/100ML; MG/100ML; MG/100ML
INJECTION, SOLUTION INTRAVENOUS CONTINUOUS
Status: DISCONTINUED | OUTPATIENT
Start: 2018-12-05 | End: 2018-12-05 | Stop reason: HOSPADM

## 2018-12-05 RX ORDER — NAPROXEN SODIUM 220 MG
440 TABLET ORAL PRN
Status: ON HOLD | COMMUNITY
End: 2018-12-11

## 2018-12-05 RX ORDER — HYDROMORPHONE HYDROCHLORIDE 1 MG/ML
0.2 INJECTION, SOLUTION INTRAMUSCULAR; INTRAVENOUS; SUBCUTANEOUS
Status: DISCONTINUED | OUTPATIENT
Start: 2018-12-05 | End: 2018-12-05 | Stop reason: HOSPADM

## 2018-12-05 RX ORDER — ONDANSETRON 2 MG/ML
4 INJECTION INTRAMUSCULAR; INTRAVENOUS
Status: DISCONTINUED | OUTPATIENT
Start: 2018-12-05 | End: 2018-12-05 | Stop reason: HOSPADM

## 2018-12-05 RX ORDER — AMOXICILLIN 250 MG
1 CAPSULE ORAL
Status: DISCONTINUED | OUTPATIENT
Start: 2018-12-05 | End: 2018-12-11 | Stop reason: HOSPADM

## 2018-12-05 RX ORDER — HYDROMORPHONE HYDROCHLORIDE 1 MG/ML
0.4 INJECTION, SOLUTION INTRAMUSCULAR; INTRAVENOUS; SUBCUTANEOUS
Status: DISCONTINUED | OUTPATIENT
Start: 2018-12-05 | End: 2018-12-05 | Stop reason: HOSPADM

## 2018-12-05 RX ORDER — POLYETHYLENE GLYCOL 3350 17 G/17G
1 POWDER, FOR SOLUTION ORAL 2 TIMES DAILY PRN
Status: DISCONTINUED | OUTPATIENT
Start: 2018-12-05 | End: 2018-12-11 | Stop reason: HOSPADM

## 2018-12-05 RX ORDER — OXYCODONE HCL 5 MG/5 ML
5 SOLUTION, ORAL ORAL
Status: COMPLETED | OUTPATIENT
Start: 2018-12-05 | End: 2018-12-05

## 2018-12-05 RX ORDER — DIPHENHYDRAMINE HYDROCHLORIDE 50 MG/ML
25 INJECTION INTRAMUSCULAR; INTRAVENOUS EVERY 6 HOURS PRN
Status: DISCONTINUED | OUTPATIENT
Start: 2018-12-05 | End: 2018-12-07

## 2018-12-05 RX ORDER — CIPROFLOXACIN 500 MG/1
500 TABLET, FILM COATED ORAL 2 TIMES DAILY
Status: COMPLETED | OUTPATIENT
Start: 2018-12-05 | End: 2018-12-10

## 2018-12-05 RX ORDER — HYDRALAZINE HYDROCHLORIDE 20 MG/ML
10 INJECTION INTRAMUSCULAR; INTRAVENOUS
Status: DISCONTINUED | OUTPATIENT
Start: 2018-12-05 | End: 2018-12-11 | Stop reason: HOSPADM

## 2018-12-05 RX ORDER — HYDROMORPHONE HYDROCHLORIDE 1 MG/ML
0.5 INJECTION, SOLUTION INTRAMUSCULAR; INTRAVENOUS; SUBCUTANEOUS
Status: DISCONTINUED | OUTPATIENT
Start: 2018-12-05 | End: 2018-12-10

## 2018-12-05 RX ORDER — OXYCODONE HYDROCHLORIDE 10 MG/1
10 TABLET ORAL
Status: DISCONTINUED | OUTPATIENT
Start: 2018-12-05 | End: 2018-12-09

## 2018-12-05 RX ORDER — SODIUM CHLORIDE 9 MG/ML
INJECTION, SOLUTION INTRAVENOUS CONTINUOUS
Status: DISCONTINUED | OUTPATIENT
Start: 2018-12-05 | End: 2018-12-11 | Stop reason: HOSPADM

## 2018-12-05 RX ORDER — OXYCODONE HYDROCHLORIDE 5 MG/1
5 TABLET ORAL
Status: DISCONTINUED | OUTPATIENT
Start: 2018-12-05 | End: 2018-12-05 | Stop reason: HOSPADM

## 2018-12-05 RX ORDER — DOCUSATE SODIUM 100 MG/1
100 CAPSULE, LIQUID FILLED ORAL 2 TIMES DAILY
Status: DISCONTINUED | OUTPATIENT
Start: 2018-12-05 | End: 2018-12-11 | Stop reason: HOSPADM

## 2018-12-05 RX ADMIN — DOCUSATE SODIUM 100 MG: 100 CAPSULE, LIQUID FILLED ORAL at 21:13

## 2018-12-05 RX ADMIN — CIPROFLOXACIN HYDROCHLORIDE 500 MG: 500 TABLET, FILM COATED ORAL at 21:13

## 2018-12-05 RX ADMIN — ANTACID TABLETS 500 MG: 500 TABLET, CHEWABLE ORAL at 21:13

## 2018-12-05 RX ADMIN — CELECOXIB 200 MG: 200 CAPSULE ORAL at 12:48

## 2018-12-05 RX ADMIN — FENTANYL CITRATE 50 MCG: 50 INJECTION, SOLUTION INTRAMUSCULAR; INTRAVENOUS at 18:35

## 2018-12-05 RX ADMIN — SODIUM CHLORIDE: 9 INJECTION, SOLUTION INTRAVENOUS at 21:14

## 2018-12-05 RX ADMIN — OXYCODONE HYDROCHLORIDE 5 MG: 5 SOLUTION ORAL at 18:34

## 2018-12-05 RX ADMIN — OXYCODONE HYDROCHLORIDE 10 MG: 10 TABLET, FILM COATED, EXTENDED RELEASE ORAL at 12:49

## 2018-12-05 RX ADMIN — STANDARDIZED SENNA CONCENTRATE AND DOCUSATE SODIUM 1 TABLET: 8.6; 5 TABLET, FILM COATED ORAL at 21:13

## 2018-12-05 RX ADMIN — METAXALONE 800 MG: 800 TABLET ORAL at 22:27

## 2018-12-05 RX ADMIN — CEFAZOLIN SODIUM 2 G: 2 INJECTION, SOLUTION INTRAVENOUS at 21:14

## 2018-12-05 RX ADMIN — OXYCODONE HYDROCHLORIDE 10 MG: 10 TABLET ORAL at 21:13

## 2018-12-05 RX ADMIN — DEXAMETHASONE SODIUM PHOSPHATE 4 MG: 4 INJECTION, SOLUTION INTRAMUSCULAR; INTRAVENOUS at 21:13

## 2018-12-05 RX ADMIN — MORPHINE SULFATE 30 MG: 30 TABLET, EXTENDED RELEASE ORAL at 22:27

## 2018-12-05 RX ADMIN — GABAPENTIN 300 MG: 300 CAPSULE ORAL at 12:49

## 2018-12-05 RX ADMIN — ACETAMINOPHEN 1000 MG: 500 TABLET, FILM COATED ORAL at 12:49

## 2018-12-05 ASSESSMENT — PATIENT HEALTH QUESTIONNAIRE - PHQ9
SUM OF ALL RESPONSES TO PHQ9 QUESTIONS 1 AND 2: 0
2. FEELING DOWN, DEPRESSED, IRRITABLE, OR HOPELESS: NOT AT ALL
1. LITTLE INTEREST OR PLEASURE IN DOING THINGS: NOT AT ALL

## 2018-12-05 ASSESSMENT — LIFESTYLE VARIABLES
TOTAL SCORE: 0
HAVE YOU EVER FELT YOU SHOULD CUT DOWN ON YOUR DRINKING: NO
ALCOHOL_USE: YES
CONSUMPTION TOTAL: NEGATIVE
TOTAL SCORE: 0
AVERAGE NUMBER OF DAYS PER WEEK YOU HAVE A DRINK CONTAINING ALCOHOL: 1
ON A TYPICAL DAY WHEN YOU DRINK ALCOHOL HOW MANY DRINKS DO YOU HAVE: 2
TOTAL SCORE: 0
EVER HAD A DRINK FIRST THING IN THE MORNING TO STEADY YOUR NERVES TO GET RID OF A HANGOVER: NO
EVER FELT BAD OR GUILTY ABOUT YOUR DRINKING: NO
HOW MANY TIMES IN THE PAST YEAR HAVE YOU HAD 5 OR MORE DRINKS IN A DAY: 0
HAVE PEOPLE ANNOYED YOU BY CRITICIZING YOUR DRINKING: NO

## 2018-12-05 ASSESSMENT — PAIN SCALES - GENERAL
PAINLEVEL_OUTOF10: 9
PAINLEVEL_OUTOF10: 8
PAINLEVEL_OUTOF10: 10
PAINLEVEL_OUTOF10: 8
PAINLEVEL_OUTOF10: 8
PAINLEVEL_OUTOF10: 10
PAINLEVEL_OUTOF10: 8
PAINLEVEL_OUTOF10: 10

## 2018-12-05 NOTE — PROGRESS NOTES
Med rec updated and complete  Allergies reviewed  Interviewed pt with  at bedside with permission from pt.  Pt had RX bottle at bedside (CIPRO 500MG).

## 2018-12-05 NOTE — PROGRESS NOTES
Asymptomtic UTI treated 2 doses of cipro, will continue postop. Preop exam bilateral delt 4/5, bicep 4+, tricep 4-,  4-, IO 3,. RLE 4+/5, sensation decr distal UE.

## 2018-12-05 NOTE — OR NURSING
POC reviewed with pt and ride. Call light within reach, educated to call for assistance. Fall precautions in place. Multimodals given with sip of water. Pt stated she is on day 2 of abx for UTI, asymptomatic. Dr. Mendoza and OR team aware.

## 2018-12-06 ENCOUNTER — APPOINTMENT (OUTPATIENT)
Dept: RADIOLOGY | Facility: MEDICAL CENTER | Age: 53
DRG: 454 | End: 2018-12-06
Attending: PHYSICIAN ASSISTANT
Payer: COMMERCIAL

## 2018-12-06 ENCOUNTER — HOSPITAL ENCOUNTER (INPATIENT)
Dept: RADIOLOGY | Facility: MEDICAL CENTER | Age: 53
DRG: 454 | End: 2018-12-06
Attending: PHYSICIAN ASSISTANT | Admitting: NEUROLOGICAL SURGERY
Payer: COMMERCIAL

## 2018-12-06 LAB
BACTERIA UR CULT: ABNORMAL
BACTERIA UR CULT: ABNORMAL
SIGNIFICANT IND 70042: ABNORMAL
SITE SITE: ABNORMAL
SOURCE SOURCE: ABNORMAL

## 2018-12-06 PROCEDURE — 700102 HCHG RX REV CODE 250 W/ 637 OVERRIDE(OP): Performed by: PHYSICIAN ASSISTANT

## 2018-12-06 PROCEDURE — 770001 HCHG ROOM/CARE - MED/SURG/GYN PRIV*

## 2018-12-06 PROCEDURE — A9270 NON-COVERED ITEM OR SERVICE: HCPCS | Performed by: PHYSICIAN ASSISTANT

## 2018-12-06 PROCEDURE — 700105 HCHG RX REV CODE 258: Performed by: PHYSICIAN ASSISTANT

## 2018-12-06 PROCEDURE — 70498 CT ANGIOGRAPHY NECK: CPT

## 2018-12-06 PROCEDURE — 700112 HCHG RX REV CODE 229: Performed by: PHYSICIAN ASSISTANT

## 2018-12-06 PROCEDURE — 700111 HCHG RX REV CODE 636 W/ 250 OVERRIDE (IP): Performed by: PHYSICIAN ASSISTANT

## 2018-12-06 PROCEDURE — 72141 MRI NECK SPINE W/O DYE: CPT

## 2018-12-06 RX ADMIN — FUROSEMIDE 40 MG: 40 TABLET ORAL at 05:55

## 2018-12-06 RX ADMIN — DOCUSATE SODIUM 100 MG: 100 CAPSULE, LIQUID FILLED ORAL at 17:43

## 2018-12-06 RX ADMIN — THERA TABS 1 TABLET: TAB at 05:56

## 2018-12-06 RX ADMIN — CIPROFLOXACIN HYDROCHLORIDE 500 MG: 500 TABLET, FILM COATED ORAL at 17:43

## 2018-12-06 RX ADMIN — DEXAMETHASONE SODIUM PHOSPHATE 4 MG: 4 INJECTION, SOLUTION INTRAMUSCULAR; INTRAVENOUS at 12:12

## 2018-12-06 RX ADMIN — BENZOCAINE AND MENTHOL 1 LOZENGE: 15; 3.6 LOZENGE ORAL at 05:55

## 2018-12-06 RX ADMIN — MORPHINE SULFATE 30 MG: 30 TABLET, EXTENDED RELEASE ORAL at 17:43

## 2018-12-06 RX ADMIN — OXYCODONE HYDROCHLORIDE 10 MG: 10 TABLET ORAL at 12:12

## 2018-12-06 RX ADMIN — CYANOCOBALAMIN TAB 500 MCG 1000 MCG: 500 TAB at 05:56

## 2018-12-06 RX ADMIN — VITAMIN D, TAB 1000IU (100/BT) 5000 UNITS: 25 TAB at 05:56

## 2018-12-06 RX ADMIN — OXYCODONE HYDROCHLORIDE 10 MG: 10 TABLET ORAL at 17:43

## 2018-12-06 RX ADMIN — METAXALONE 800 MG: 800 TABLET ORAL at 12:12

## 2018-12-06 RX ADMIN — BENZOCAINE AND MENTHOL 1 LOZENGE: 15; 3.6 LOZENGE ORAL at 17:43

## 2018-12-06 RX ADMIN — OXYCODONE HYDROCHLORIDE 10 MG: 10 TABLET ORAL at 00:25

## 2018-12-06 RX ADMIN — BENZOCAINE AND MENTHOL 1 LOZENGE: 15; 3.6 LOZENGE ORAL at 08:12

## 2018-12-06 RX ADMIN — POTASSIUM CHLORIDE 20 MEQ: 1500 TABLET, EXTENDED RELEASE ORAL at 05:56

## 2018-12-06 RX ADMIN — SODIUM CHLORIDE: 9 INJECTION, SOLUTION INTRAVENOUS at 06:01

## 2018-12-06 RX ADMIN — ANTACID TABLETS 500 MG: 500 TABLET, CHEWABLE ORAL at 05:57

## 2018-12-06 RX ADMIN — DOCUSATE SODIUM 100 MG: 100 CAPSULE, LIQUID FILLED ORAL at 05:56

## 2018-12-06 RX ADMIN — OXYCODONE HYDROCHLORIDE 10 MG: 10 TABLET ORAL at 08:12

## 2018-12-06 RX ADMIN — CEFAZOLIN SODIUM 2 G: 2 INJECTION, SOLUTION INTRAVENOUS at 05:57

## 2018-12-06 RX ADMIN — METAXALONE 800 MG: 800 TABLET ORAL at 21:00

## 2018-12-06 RX ADMIN — METAXALONE 800 MG: 800 TABLET ORAL at 17:43

## 2018-12-06 RX ADMIN — BENZOCAINE AND MENTHOL 1 LOZENGE: 15; 3.6 LOZENGE ORAL at 12:12

## 2018-12-06 RX ADMIN — METAXALONE 800 MG: 800 TABLET ORAL at 08:12

## 2018-12-06 RX ADMIN — CIPROFLOXACIN HYDROCHLORIDE 500 MG: 500 TABLET, FILM COATED ORAL at 05:56

## 2018-12-06 RX ADMIN — MORPHINE SULFATE 30 MG: 30 TABLET, EXTENDED RELEASE ORAL at 05:56

## 2018-12-06 RX ADMIN — OXYCODONE HYDROCHLORIDE 10 MG: 10 TABLET ORAL at 03:55

## 2018-12-06 RX ADMIN — BENZOCAINE AND MENTHOL 1 LOZENGE: 15; 3.6 LOZENGE ORAL at 21:01

## 2018-12-06 RX ADMIN — ANTACID TABLETS 500 MG: 500 TABLET, CHEWABLE ORAL at 17:43

## 2018-12-06 RX ADMIN — DEXAMETHASONE SODIUM PHOSPHATE 4 MG: 4 INJECTION, SOLUTION INTRAMUSCULAR; INTRAVENOUS at 05:57

## 2018-12-06 RX ADMIN — STANDARDIZED SENNA CONCENTRATE AND DOCUSATE SODIUM 1 TABLET: 8.6; 5 TABLET, FILM COATED ORAL at 21:00

## 2018-12-06 ASSESSMENT — PAIN SCALES - GENERAL
PAINLEVEL_OUTOF10: 10
PAINLEVEL_OUTOF10: 10
PAINLEVEL_OUTOF10: 6
PAINLEVEL_OUTOF10: 8
PAINLEVEL_OUTOF10: 10
PAINLEVEL_OUTOF10: 10
PAINLEVEL_OUTOF10: 9
PAINLEVEL_OUTOF10: 8
PAINLEVEL_OUTOF10: 10
PAINLEVEL_OUTOF10: 8

## 2018-12-06 NOTE — PROGRESS NOTES
Patient was admitted to unit. She was slide boarded and stated she's been using a wheelchair for a while now and is hard for her to walk. She is AOx4, a little drowsy but answers appropriately. She rated her pain at 10/10, most pain is on her left hip from a fall prior to hospitalization. Per patient, she's due for a hip surgery in 6 weeks. She is oriented to the unit. Discussed plan of care and answered questions. She reported numbness and tingling on both of her hands and arms which are present prior to surgery. Sx dsg CDI. Reported sore throat but pass dysphagia screen. Hvac in place. SCDs on. Bed alarm on. Call light within reach.

## 2018-12-06 NOTE — DISCHARGE PLANNING
Current documentation reveals a potential for acute inpatient rehabilitation, pending TX harshad s/p st II tomorrow.  Please consider a PMR referral to assist with discharge planning.

## 2018-12-06 NOTE — THERAPY
PT order received. Patient scheduled for stage II surgical intervention 12/7. Will hold PT eval until surgery complete.    Jinny Figueroa, PT, DPT  [phone]

## 2018-12-06 NOTE — OP REPORT
DATE OF SERVICE:  12/05/2018    PREOPERATIVE DIAGNOSES:  1.  Cervical spondylosis.  2.  Cervical stenosis.  3.  Cervical spondylolisthesis.  4.  Cervical kyphosis  5.  Cervical myelopathy.    POSTOPERATIVE DIAGNOSES:  1.  Cervical spondylosis.  2.  Cervical stenosis.  3.  Cervical spondylolisthesis.  4.  Cervical kyphosis  5.  Cervical myelopathy.    PROCEDURES PERFORMED:  1.  Anterior C4 corpectomy, diskectomy, decompression of thecal sac and nerve   roots.  2.  Anterior C5 corpectomy, diskectomy and decompression of thecal sac and   nerve roots.  3.  C4-5 expandable titanium interbody cage placement.  4.  Open reduction of cervical kyphosis.  5.  C3, C4, C5, C6 anterior interbody/autograft fusion.  6.  C3, C6 anterior plating fixation.  7.  Microscopic dissection.  8.  Intraoperative monitoring.    SURGEON:  Emre Mendoza MD    FIRST ASSISTANT:  Micky Hart PA-C    ANESTHESIA:  General.    COMPLICATIONS:  None.    ESTIMATED BLOOD LOSS:  50 mL.    FINDINGS:  Well decompressed thecal sac.  No change in motors and SSEPs.  Good   reduction achieved with expandable cage.  Somewhat soft bony fixation collar   until stage II, Friday.    COMPLICATIONS:  None.    DRAINS:  Left subplatysmal Hemovac.    DISPOSITION:  Extubated to recovery and to floor.    HISTORY OF PRESENT ILLNESS:  A 53-year-old woman who had cervical spondylitic   myelopathy that was progressive even to the point of being seen her as an   outpatient and now.  She had asymptomatic UTI and preoperative UA without   symptoms.  Her cervical myelopathy was progressive.  She had a kyphosis.  She   is a candidate for anterior correction with a posterior decompression and   fusion, with stage II.  The risks were explained to her as pain, infection,   bleeding, CSF leak, failure to completely resolve symptoms, neurologic   deficits including pain, weakness, numbness, bladder or bowel difficulty,   failure to fixation, failure to fusion, need for rostral  caudal extension due   to junctional stenosis, injury to the trachea, carotid and esophagus,   temporary or permanent speaking or swallowing difficulties.  She understood   the risks, benefits, and agreed to the consent.    SUMMARY OF THE OPERATIVE PROCEDURE:  The patient was brought to the operative   suite, placed under general anesthesia.  Su catheter was placed, lines   secured.  Upper extremity and lower extremity motors and SSEPs were run at   baseline by NMA monitoring.  She had reasonable signals except the right lower   extremity motors and sensories were weaker and somewhat less in the right   upper extremity.  Those baselines were done supine and the head rolled and we   placed her head on a donut, placed her back in extension, try to reduce some   of the reduction, reduce some of the kyphosis.  We reran motors.  There were   no changes.  Anesthesia kept Maps greater than 85 during the case.  X-ray   fluoroscopy was brought in to draw out a midline anterior right anterior   transverse neck incision overlying C4-C5 disk space infiltrated with Marcaine   and epinephrine.  Preoperative antibiotics were given.  Proper time-out was   performed.  Patient was prepped and draped in sterile fashion.    A linear incision was made and soft tissues dissected with monopolar   electrocautery.  The platysma was incised sharply and did subplatysmal   dissection using blunt dissection techniques, we removed the strap muscles   medially and the removed carotid sheath laterally.  We tied off some crossing   veins and arteries with ligation sutures.  We expanded into the prevertebral   fascia, elevated the longus colli.  Once got it by x-ray at the appropriate   levels, the very abnormal anatomy, did lengthen the time of approach to   identify the correct levels.  We placed [place] pins in the C3 and C6   reconstructed up down Shadow-Line retractor and did distraction.  We ran   motors and SSEPs every 15 minutes, there  were never any changes.    C4, C5 anterior corpectomy, diskectomy, decompression of thecal sac and nerve   roots:  We performed a diskectomy what was left in the very degenerated disk   at C3-C4 and C5-C6.  We then drilled off using a bone sucker, harvested the   autograft to do wide corpectomy 14 mm wide at C4 and C5 all the way down to   the thickened PLL.  It was very stuck down.  We brought the microscope in to   improve visualization.  We elevated the very thickened PLL off the spinal cord   very carefully and we were able to resect without any change in motors and   SSEPs.  We resected it widely.  We undercut C3 and C6.  We were   very happy with our decompression, we were patient and there was no trauma to   the spine and the very compressed spinal cord, we waxed the bony edges.  We laid   down some Surgiflo to get hemostasis and bipolared back any bleeding epidural   veins.    C4-C5 expandable titanium interbody cage placement:  Using the Kwasi small   vd2 cage 0-degree up and down endplates 12 mm wide and with an expandable 24   to 40 mm ability.  We placed it under fluoroscopic guidance, slowly expanded   it.  We got about 9 mm of increased length and we verified it on lateral   x-rays and there was no overt distraction of the facets.  We were very happy   with the reduction.    Open reduction of cervical kyphosis:  This is a 59-modifier maneuver that   required additional level of skill, risk and expertise to do this without   injuring the patient.  Using intermittent changes in the posture of the   patient and more extension, we were able to slowly expand the cage and   delivered in a good position with reduction to a near zero kyphosis where the   patient had a 24-degree cervical kyphosis preoperatively.  We were happy with   the open reduction via the expandable cage placement.    C3, C4, C5, C6, anterior interbody/autograft fusion:  Because of the tight   apposition of the expandable cage to the  endplates and it was packed tightly   with the patient's morcellized autograft assured interbody autograft fusion   over the course of the spanning of that corpectomy space.    C3, C6 anterior plating fixation:  Using the Transom cervical plate   appropriately sized not to much rostral caudal overhang, we drilled parallel   to the endplates bilaterally at C3 and C6, we placed 4.0x60 mm self-tapping   screws and tightened them down with manufacture's locking nut to make sure   they would not back out.  We were happy with the final construct, no change in   motors and SSEPs.    We copiously irrigated with bacitracin infused saline.  We slowly withdrew the   retractors and assured hemostasis and tunneled out subplatysmal Hemovac.  We   closed the wound in anatomic layers with 3-0 Vicryl for bringing the strap   muscles back to the external sternocleidomastoid, 3-0 Vicryl for the platysma,   3-0 Vicryls for the dermis and Steri-Strips for the skin.  Sterile dressing   was applied.  Patient was placed in a collar and extubated.    There were no complications.  Needle and sponge count were correct at the end   of the case, stage II is Friday.  We will get an MRI and CTA to verify   location of the vertebral arteries and look at for residual stenosis.       ____________________________________     MD BERTRAND Campoverde III / RICHARD    DD:  12/05/2018 17:41:01  DT:  12/05/2018 22:37:23    D#:  [id number]  Job#:  [id number]

## 2018-12-06 NOTE — DISCHARGE PLANNING
Care Transition Team Assessment    Information Source  Orientation : Oriented x 4  Information Given By: Patient  Who is responsible for making decisions for patient? : Patient         Elopement Risk  Legal Hold: No  Ambulatory or Self Mobile in Wheelchair: No-Not an Elopement Risk  Elopement Risk: Not at Risk for Elopement    Interdisciplinary Discharge Planning  Does Admitting Nurse Feel This Could be a Complex Discharge?: No  Primary Care Physician: Dr. Rubin  Lives with - Patient's Self Care Capacity: Alone and Able to Care For Self  Patient or legal guardian wants to designate a caregiver (see row info): Yes  Caregiver name: Contreras Obando  Caregiver relationship to patient: Significant Other  Support Systems: Friends / Neighbors  Housing / Facility: 2 Story Apartment / Condo (walk up)  Do You Take your Prescribed Medications Regularly: Yes  Able to Return to Previous ADL's: Yes  Mobility Issues: Yes  Prior Services: Skilled Home Health Services (by [facility] )  Patient Expects to be Discharged to:: wants IRF  Assistance Needed: Yes  Durable Medical Equipment: Walker, Other - Specify (uses wheelchair)    Discharge Preparedness  What is your plan after discharge?: Other (comment) (pt said she wants acute rehab)  What are your discharge supports?: Other (comment) (friend)  Prior Functional Level: Independent with Activities of Daily Living, Uses Wheelchair  Difficulity with ADLs: Walking  Difficulity with IADLs: Driving    Functional Assesment  Prior Functional Level: Independent with Activities of Daily Living, Uses Wheelchair    Finances  Financial Barriers to Discharge: No  Prescription Coverage: Yes    Vision / Hearing Impairment  Vision Impairment : Yes  Right Eye Vision: Impaired, Wears Glasses  Left Eye Vision: Impaired, Wears Glasses  Hearing Impairment : No    Values / Beliefs / Concerns  Values / Beliefs Concerns : No  Spiritual Requests During Hospitalization: No         Domestic Abuse  Have you ever  been the victim of abuse or violence?: No  Physical Abuse or Sexual Abuse: No  Verbal Abuse or Emotional Abuse: No  Possible Abuse Reported to:: Not Applicable              Anticipated Discharge Information  Anticipated discharge disposition: Acute rehab

## 2018-12-06 NOTE — PROGRESS NOTES
Received report, assumed pt care. Pt a&o 4, VSS, assessment completed. Resting comfortably in bed with call light, bedside table in reach. No c/o at this time. Side rails up 2. Instructed to use call light when needing assistance verbalized understanding. Bed alarm on, bed in low position. Will continue to monitor.

## 2018-12-06 NOTE — CARE PLAN
Problem: Safety  Goal: Will remain free from injury  Pt educated to use the call light when needing assistance, pt confirmed understanding.     Problem: Knowledge Deficit  Goal: Knowledge of disease process/condition, treatment plan, diagnostic tests, and medications will improve  POC: Stage 2 on 12/7, pt NPO at MN

## 2018-12-06 NOTE — DISCHARGE PLANNING
Anticipated Discharge Disposition:   Pt wants IRF    Action:   Met with pt.   She reports difficulty walking and unable to manage well with a walker so she is now using a wheelchair. She told CM that a doctor or assistant had promised her that she would go to [facility]. Explained to her that physiatrist would need to assess her first and he will decide if they can accept. Also would need to have auth from insurance. But reassurred pt that CM will assist her in appropriate level of care. Pt said that she has no help and she slipped in bathroom already. She also has pending hip surgery.     Dr. Segal alerted.    Barriers to Discharge:   Surgical clearance-surgery tomorrow    Plan:   Follow up with pt after surgery  MD please order physiatry referral.

## 2018-12-06 NOTE — OR NURSING
Patient A&Ox4 and tolerating water. Pt sitting up in bed with aspen collar in place. Dressing to anterior neck CDI. hemovac in place and drainage minimal. VSS. Pt pain decreasing and medicating per MAR. Pt has no nausea. Tried to call pt friend Contreras and Maribell, but neither answered and no name on voicemail. Will try again later. Will continue to monitor pt and will send to room when appropriate.     Tried calling friends a second time. No answer.

## 2018-12-06 NOTE — PROGRESS NOTES
Neurosurgery Progress Note    Subjective:  POD #1 C3-6 ACFD with C4 and C5 corpectomy and cage.  Doing well.  Arm sensation and pain improving  Strength a bit better in arms --Still with Hoffmans/Clonus  Legs strong.  Ambulating to BR.  Swalowing with mild soreness.  Speaking and breathing normal  Wound C/D/I  Pleased with improvement  Drain at 60cc/8hrs  Posterior Decompression on for tomorrow.  NPO for tomorrow    Exam:  Wound C/D/I   Strength /5 in leggs  Arms  4/5 on Right 3-4/5 on Left.   Sensory improved  Swallowing well with mild soreness.  Breathing and speaking well.      BP  Min: 119/80  Max: 163/94  Pulse  Av.8  Min: 91  Max: 115  Resp  Av.6  Min: 7  Max: 21  Temp  Av.6 °C (97.8 °F)  Min: 36.1 °C (96.9 °F)  Max: 36.9 °C (98.4 °F)  SpO2  Av.3 %  Min: 90 %  Max: 100 %    No Data Recorded    Recent Labs      18   1115   WBC  8.4   RBC  4.87   HEMOGLOBIN  12.7   HEMATOCRIT  37.6   MCV  77.2*   MCH  26.1*   MCHC  33.8   RDW  49.4   PLATELETCT  467*   MPV  9.6     Recent Labs      18   1115   SODIUM  139   POTASSIUM  3.8   CHLORIDE  106   CO2  24   GLUCOSE  86   BUN  12   CREATININE  0.61   CALCIUM  9.6     Recent Labs      18   1115   APTT  31.2   INR  1.01           Intake/Output       18 0700 - 18 0659 18 0700 - 18 0659      [id number] [id number] Total [id number] 5046-1965 Total       Intake    I.V.  1200  -- 1200  --  -- --    Crystalloid Intake 1200 -- 1200 -- -- --    Total Intake 1200 -- 1200 -- -- --       Output    Urine  650  300 950  --  -- --    Urine Void (mL) 350 -- 350 -- -- --    Output (mL) (Urethral Catheter Latex) 300 300 600 -- -- --    Drains  0  60 60  --  -- --    Output (mL) (Closed/Suction Drain 1 Medial Back Hemovac) 0 60 60 -- -- --    Blood  50  -- 50  --  -- --    Est. Blood Loss (mL) 50 -- 50 -- -- --    Total Output  -- -- --       Net I/O     500 -360 140 -- -- --            Intake/Output Summary (Last 24  hours) at 12/06/18 0806  Last data filed at 12/06/18 0355   Gross per 24 hour   Intake             1200 ml   Output             1060 ml   Net              140 ml            • ciprofloxacin  500 mg BID   • cyanocobalamin  1,000 mcg DAILY   • furosemide  40 mg DAILY   • metaxalone  800 mg 4X/DAY   • morphine ER  30 mg Q12HRS   • multivitamin  1 Tab DAILY   • potassium chloride SA  20 mEq DAILY   • Pharmacy Consult Request  1 Each PRN   • MD ALERT...DO NOT ADMINISTER NSAIDS or ASPIRIN unless ORDERED By Neurosurgery  1 Each PRN   • docusate sodium  100 mg BID   • senna-docusate  1 Tab Nightly   • senna-docusate  1 Tab Q24HRS PRN   • polyethylene glycol/lytes  1 Packet BID PRN   • magnesium hydroxide  30 mL QDAY PRN   • bisacodyl  10 mg Q24HRS PRN   • fleet  1 Each Once PRN   • NS   Continuous   • [START ON 12/7/2018] enoxaparin  40 mg DAILY   • oxyCODONE immediate release  10 mg Q3HRS PRN   • oxyCODONE immediate-release  5 mg Q3HRS PRN   • HYDROmorphone  0.5 mg Q3HRS PRN   • diphenhydrAMINE  25 mg Q6HRS PRN    Or   • diphenhydrAMINE  25 mg Q6HRS PRN   • ondansetron  4 mg Q4HRS PRN   • ondansetron  4 mg Q4HRS PRN   • promethazine  12.5-25 mg Q4HRS PRN   • promethazine  12.5-25 mg Q4HRS PRN   • prochlorperazine  5-10 mg Q4HRS PRN   • dexamethasone  4 mg Q6HRS   • ALPRAZolam  0.25 mg TID PRN   • benzocaine-menthol  1 Lozenge Q2HRS PRN   • calcium carbonate  500 mg BID   • vitamin D  5,000 Units DAILY   • hydrALAZINE  10 mg Q HOUR PRN       Assessment and Plan:  POD #1 C3-6 ACFD with C4 and C5 corpectomy and cage  Feeling a bit better after Stage 1 with improved function in arms   Still weak but strength better in left than right  Still with hyperreflexia.  Ambulating well and will have PT/OT ambulate today  Feeling Better.  Continue pain management and Drain monitoring  Will watch swallowing.  NPO after midnight for stage 2 tomorrow  (Posterior lami/fusion)     Prophylactic anticoagulation: no         Start date/time:  saturday

## 2018-12-06 NOTE — OR SURGEON
Immediate Post OP Note    PreOp Diagnosis:Cervical Spondylosis with Stenosis and  Myelopathy    PostOp Diagnosis: Same    Procedure(s):  CERVICAL DISK AND FUSION ANTERIOR-STAGE #1 C4-5 - Wound Class: Clean with Drain  CORPECTOMY - Wound Class: Clean with Drain    Surgeon(s):  Emre Mendoza III, M.D.    Anesthesiologist/Type of Anesthesia:  Anesthesiologist: Dejon Todd M.D./General    Surgical Staff:  Assistant: Berry Hart P.A.-C.  Circulator: Candace Jackson R.N.  Scrub Person: Angelita Verma  Radiology Technologist: Nidia Patterson    Specimens removed if any:  * No specimens in log *    Estimated Blood Loss: 50 cc    Findings: See Op Note    Complications: None        12/5/2018 5:49 PM Berry Hart P.A.-C.

## 2018-12-07 ENCOUNTER — APPOINTMENT (OUTPATIENT)
Dept: RADIOLOGY | Facility: MEDICAL CENTER | Age: 53
DRG: 454 | End: 2018-12-07
Attending: ANESTHESIOLOGY
Payer: COMMERCIAL

## 2018-12-07 ENCOUNTER — APPOINTMENT (OUTPATIENT)
Dept: RADIOLOGY | Facility: MEDICAL CENTER | Age: 53
DRG: 454 | End: 2018-12-07
Attending: NEUROLOGICAL SURGERY
Payer: COMMERCIAL

## 2018-12-07 ENCOUNTER — APPOINTMENT (OUTPATIENT)
Dept: RADIOLOGY | Facility: MEDICAL CENTER | Age: 53
DRG: 454 | End: 2018-12-07
Attending: PHYSICIAN ASSISTANT
Payer: COMMERCIAL

## 2018-12-07 PROCEDURE — 95937 NEUROMUSCULAR JUNCTION TEST: CPT | Performed by: NEUROLOGICAL SURGERY

## 2018-12-07 PROCEDURE — 501445 HCHG STAPLER, SKIN DISP: Performed by: NEUROLOGICAL SURGERY

## 2018-12-07 PROCEDURE — 95925 SOMATOSENSORY TESTING: CPT | Performed by: NEUROLOGICAL SURGERY

## 2018-12-07 PROCEDURE — 700112 HCHG RX REV CODE 229: Performed by: PHYSICIAN ASSISTANT

## 2018-12-07 PROCEDURE — 500389 HCHG DRAIN, RESERVOIR SUCT JP 100CC: Performed by: NEUROLOGICAL SURGERY

## 2018-12-07 PROCEDURE — 95939 C MOTOR EVOKED UPR&LWR LIMBS: CPT | Performed by: NEUROLOGICAL SURGERY

## 2018-12-07 PROCEDURE — 160002 HCHG RECOVERY MINUTES (STAT): Performed by: NEUROLOGICAL SURGERY

## 2018-12-07 PROCEDURE — 0RG4071 FUSION OF CERVICOTHORACIC VERTEBRAL JOINT WITH AUTOLOGOUS TISSUE SUBSTITUTE, POSTERIOR APPROACH, POSTERIOR COLUMN, OPEN APPROACH: ICD-10-PCS | Performed by: NEUROLOGICAL SURGERY

## 2018-12-07 PROCEDURE — 160048 HCHG OR STATISTICAL LEVEL 1-5: Performed by: NEUROLOGICAL SURGERY

## 2018-12-07 PROCEDURE — 500864 HCHG NEEDLE, SPINAL 18G: Performed by: NEUROLOGICAL SURGERY

## 2018-12-07 PROCEDURE — 700101 HCHG RX REV CODE 250

## 2018-12-07 PROCEDURE — 770001 HCHG ROOM/CARE - MED/SURG/GYN PRIV*

## 2018-12-07 PROCEDURE — 501838 HCHG SUTURE GENERAL: Performed by: NEUROLOGICAL SURGERY

## 2018-12-07 PROCEDURE — 500885 HCHG PACK, JACKSON TABLE: Performed by: NEUROLOGICAL SURGERY

## 2018-12-07 PROCEDURE — 160036 HCHG PACU - EA ADDL 30 MINS PHASE I: Performed by: NEUROLOGICAL SURGERY

## 2018-12-07 PROCEDURE — 700102 HCHG RX REV CODE 250 W/ 637 OVERRIDE(OP): Performed by: ANESTHESIOLOGY

## 2018-12-07 PROCEDURE — A9270 NON-COVERED ITEM OR SERVICE: HCPCS | Performed by: ANESTHESIOLOGY

## 2018-12-07 PROCEDURE — 500375 HCHG DRAIN, J-P ROUND 10FR: Performed by: NEUROLOGICAL SURGERY

## 2018-12-07 PROCEDURE — 160035 HCHG PACU - 1ST 60 MINS PHASE I: Performed by: NEUROLOGICAL SURGERY

## 2018-12-07 PROCEDURE — 160031 HCHG SURGERY MINUTES - 1ST 30 MINS LEVEL 5: Performed by: NEUROLOGICAL SURGERY

## 2018-12-07 PROCEDURE — 700111 HCHG RX REV CODE 636 W/ 250 OVERRIDE (IP): Performed by: PHYSICIAN ASSISTANT

## 2018-12-07 PROCEDURE — C1713 ANCHOR/SCREW BN/BN,TIS/BN: HCPCS | Performed by: NEUROLOGICAL SURGERY

## 2018-12-07 PROCEDURE — 700102 HCHG RX REV CODE 250 W/ 637 OVERRIDE(OP)

## 2018-12-07 PROCEDURE — 160042 HCHG SURGERY MINUTES - EA ADDL 1 MIN LEVEL 5: Performed by: NEUROLOGICAL SURGERY

## 2018-12-07 PROCEDURE — 95907 NVR CNDJ TST 1-2 STUDIES: CPT | Performed by: NEUROLOGICAL SURGERY

## 2018-12-07 PROCEDURE — 700111 HCHG RX REV CODE 636 W/ 250 OVERRIDE (IP)

## 2018-12-07 PROCEDURE — 95940 IONM IN OPERATNG ROOM 15 MIN: CPT | Performed by: NEUROLOGICAL SURGERY

## 2018-12-07 PROCEDURE — 700102 HCHG RX REV CODE 250 W/ 637 OVERRIDE(OP): Performed by: PHYSICIAN ASSISTANT

## 2018-12-07 PROCEDURE — 72040 X-RAY EXAM NECK SPINE 2-3 VW: CPT

## 2018-12-07 PROCEDURE — A9270 NON-COVERED ITEM OR SERVICE: HCPCS

## 2018-12-07 PROCEDURE — 71045 X-RAY EXAM CHEST 1 VIEW: CPT

## 2018-12-07 PROCEDURE — 00NW0ZZ RELEASE CERVICAL SPINAL CORD, OPEN APPROACH: ICD-10-PCS | Performed by: NEUROLOGICAL SURGERY

## 2018-12-07 PROCEDURE — 95861 NEEDLE EMG 2 EXTREMITIES: CPT | Performed by: NEUROLOGICAL SURGERY

## 2018-12-07 PROCEDURE — 700105 HCHG RX REV CODE 258: Performed by: PHYSICIAN ASSISTANT

## 2018-12-07 PROCEDURE — 160009 HCHG ANES TIME/MIN: Performed by: NEUROLOGICAL SURGERY

## 2018-12-07 PROCEDURE — A9270 NON-COVERED ITEM OR SERVICE: HCPCS | Performed by: PHYSICIAN ASSISTANT

## 2018-12-07 PROCEDURE — 502000 HCHG MISC OR IMPLANTS RC 0278: Performed by: NEUROLOGICAL SURGERY

## 2018-12-07 PROCEDURE — 110454 HCHG SHELL REV 250: Performed by: NEUROLOGICAL SURGERY

## 2018-12-07 PROCEDURE — 0RG2071 FUSION OF 2 OR MORE CERVICAL VERTEBRAL JOINTS WITH AUTOLOGOUS TISSUE SUBSTITUTE, POSTERIOR APPROACH, POSTERIOR COLUMN, OPEN APPROACH: ICD-10-PCS | Performed by: NEUROLOGICAL SURGERY

## 2018-12-07 PROCEDURE — 4A11X4G MONITORING OF PERIPHERAL NERVOUS ELECTRICAL ACTIVITY, INTRAOPERATIVE, EXTERNAL APPROACH: ICD-10-PCS | Performed by: NEUROLOGICAL SURGERY

## 2018-12-07 PROCEDURE — 72125 CT NECK SPINE W/O DYE: CPT

## 2018-12-07 DEVICE — DBM CHUNKY PROGENIX PLS 10.CC: Type: IMPLANTABLE DEVICE | Status: FUNCTIONAL

## 2018-12-07 RX ORDER — DIPHENHYDRAMINE HCL 25 MG
25 TABLET ORAL EVERY 6 HOURS PRN
Status: DISCONTINUED | OUTPATIENT
Start: 2018-12-07 | End: 2018-12-11 | Stop reason: HOSPADM

## 2018-12-07 RX ORDER — DIPHENHYDRAMINE HYDROCHLORIDE 50 MG/ML
25 INJECTION INTRAMUSCULAR; INTRAVENOUS EVERY 6 HOURS PRN
Status: DISCONTINUED | OUTPATIENT
Start: 2018-12-07 | End: 2018-12-07

## 2018-12-07 RX ORDER — OXYCODONE HYDROCHLORIDE 10 MG/1
10 TABLET ORAL
Status: DISCONTINUED | OUTPATIENT
Start: 2018-12-07 | End: 2018-12-07

## 2018-12-07 RX ORDER — IPRATROPIUM BROMIDE AND ALBUTEROL SULFATE 2.5; .5 MG/3ML; MG/3ML
3 SOLUTION RESPIRATORY (INHALATION)
Status: DISCONTINUED | OUTPATIENT
Start: 2018-12-07 | End: 2018-12-07 | Stop reason: HOSPADM

## 2018-12-07 RX ORDER — HYDRALAZINE HYDROCHLORIDE 20 MG/ML
10 INJECTION INTRAMUSCULAR; INTRAVENOUS
Status: DISCONTINUED | OUTPATIENT
Start: 2018-12-07 | End: 2018-12-07

## 2018-12-07 RX ORDER — PROMETHAZINE HYDROCHLORIDE 25 MG/1
12.5-25 TABLET ORAL EVERY 4 HOURS PRN
Status: DISCONTINUED | OUTPATIENT
Start: 2018-12-07 | End: 2018-12-07

## 2018-12-07 RX ORDER — ONDANSETRON 2 MG/ML
4 INJECTION INTRAMUSCULAR; INTRAVENOUS EVERY 4 HOURS PRN
Status: DISCONTINUED | OUTPATIENT
Start: 2018-12-07 | End: 2018-12-07

## 2018-12-07 RX ORDER — ENEMA 19; 7 G/133ML; G/133ML
1 ENEMA RECTAL
Status: DISCONTINUED | OUTPATIENT
Start: 2018-12-07 | End: 2018-12-07

## 2018-12-07 RX ORDER — ALPRAZOLAM 0.5 MG/1
0.5 TABLET ORAL 3 TIMES DAILY PRN
Status: DISCONTINUED | OUTPATIENT
Start: 2018-12-07 | End: 2018-12-07

## 2018-12-07 RX ORDER — POLYETHYLENE GLYCOL 3350 17 G/17G
1 POWDER, FOR SOLUTION ORAL 2 TIMES DAILY PRN
Status: DISCONTINUED | OUTPATIENT
Start: 2018-12-07 | End: 2018-12-07

## 2018-12-07 RX ORDER — MORPHINE SULFATE 4 MG/ML
4 INJECTION, SOLUTION INTRAMUSCULAR; INTRAVENOUS
Status: DISCONTINUED | OUTPATIENT
Start: 2018-12-07 | End: 2018-12-10

## 2018-12-07 RX ORDER — SODIUM CHLORIDE, SODIUM GLUCONATE, SODIUM ACETATE, POTASSIUM CHLORIDE AND MAGNESIUM CHLORIDE 526; 502; 368; 37; 30 MG/100ML; MG/100ML; MG/100ML; MG/100ML; MG/100ML
500 INJECTION, SOLUTION INTRAVENOUS CONTINUOUS
Status: DISCONTINUED | OUTPATIENT
Start: 2018-12-07 | End: 2018-12-07 | Stop reason: HOSPADM

## 2018-12-07 RX ORDER — CALCIUM CARBONATE 500 MG/1
500 TABLET, CHEWABLE ORAL 2 TIMES DAILY
Status: DISCONTINUED | OUTPATIENT
Start: 2018-12-07 | End: 2018-12-07

## 2018-12-07 RX ORDER — BUPIVACAINE HYDROCHLORIDE AND EPINEPHRINE 5; 5 MG/ML; UG/ML
INJECTION, SOLUTION PERINEURAL
Status: DISCONTINUED | OUTPATIENT
Start: 2018-12-07 | End: 2018-12-07 | Stop reason: HOSPADM

## 2018-12-07 RX ORDER — AMOXICILLIN 250 MG
1 CAPSULE ORAL NIGHTLY
Status: DISCONTINUED | OUTPATIENT
Start: 2018-12-07 | End: 2018-12-07

## 2018-12-07 RX ORDER — AMOXICILLIN 250 MG
1 CAPSULE ORAL
Status: DISCONTINUED | OUTPATIENT
Start: 2018-12-07 | End: 2018-12-07

## 2018-12-07 RX ORDER — DOCUSATE SODIUM 100 MG/1
100 CAPSULE, LIQUID FILLED ORAL 2 TIMES DAILY
Status: DISCONTINUED | OUTPATIENT
Start: 2018-12-07 | End: 2018-12-07

## 2018-12-07 RX ORDER — SODIUM CHLORIDE 9 MG/ML
INJECTION, SOLUTION INTRAVENOUS CONTINUOUS
Status: DISCONTINUED | OUTPATIENT
Start: 2018-12-07 | End: 2018-12-11 | Stop reason: HOSPADM

## 2018-12-07 RX ORDER — BACITRACIN 50000 [IU]/1
INJECTION, POWDER, FOR SOLUTION INTRAMUSCULAR
Status: DISCONTINUED | OUTPATIENT
Start: 2018-12-07 | End: 2018-12-07 | Stop reason: HOSPADM

## 2018-12-07 RX ORDER — DEXAMETHASONE SODIUM PHOSPHATE 4 MG/ML
4 INJECTION, SOLUTION INTRA-ARTICULAR; INTRALESIONAL; INTRAMUSCULAR; INTRAVENOUS; SOFT TISSUE EVERY 6 HOURS
Status: DISCONTINUED | OUTPATIENT
Start: 2018-12-07 | End: 2018-12-07

## 2018-12-07 RX ORDER — OXYCODONE HYDROCHLORIDE 5 MG/1
10 TABLET ORAL
Status: DISCONTINUED | OUTPATIENT
Start: 2018-12-07 | End: 2018-12-07 | Stop reason: HOSPADM

## 2018-12-07 RX ORDER — HYDROMORPHONE HYDROCHLORIDE 1 MG/ML
0.4 INJECTION, SOLUTION INTRAMUSCULAR; INTRAVENOUS; SUBCUTANEOUS
Status: DISCONTINUED | OUTPATIENT
Start: 2018-12-07 | End: 2018-12-07 | Stop reason: HOSPADM

## 2018-12-07 RX ORDER — OXYCODONE HCL 5 MG/5 ML
5 SOLUTION, ORAL ORAL
Status: COMPLETED | OUTPATIENT
Start: 2018-12-07 | End: 2018-12-07

## 2018-12-07 RX ORDER — OXYCODONE HYDROCHLORIDE 5 MG/1
5 TABLET ORAL
Status: DISCONTINUED | OUTPATIENT
Start: 2018-12-07 | End: 2018-12-07

## 2018-12-07 RX ORDER — OXYCODONE HYDROCHLORIDE 5 MG/1
5 TABLET ORAL
Status: DISCONTINUED | OUTPATIENT
Start: 2018-12-07 | End: 2018-12-07 | Stop reason: HOSPADM

## 2018-12-07 RX ORDER — ALPRAZOLAM 0.5 MG/1
0.5 TABLET ORAL 3 TIMES DAILY PRN
Status: DISCONTINUED | OUTPATIENT
Start: 2018-12-07 | End: 2018-12-11 | Stop reason: HOSPADM

## 2018-12-07 RX ORDER — DIPHENHYDRAMINE HYDROCHLORIDE 50 MG/ML
12.5 INJECTION INTRAMUSCULAR; INTRAVENOUS
Status: DISCONTINUED | OUTPATIENT
Start: 2018-12-07 | End: 2018-12-07 | Stop reason: HOSPADM

## 2018-12-07 RX ORDER — PROMETHAZINE HYDROCHLORIDE 25 MG/1
12.5-25 SUPPOSITORY RECTAL EVERY 4 HOURS PRN
Status: DISCONTINUED | OUTPATIENT
Start: 2018-12-07 | End: 2018-12-07

## 2018-12-07 RX ORDER — CEFAZOLIN SODIUM 2 G/100ML
2 INJECTION, SOLUTION INTRAVENOUS EVERY 8 HOURS
Status: DISCONTINUED | OUTPATIENT
Start: 2018-12-07 | End: 2018-12-07

## 2018-12-07 RX ORDER — SODIUM CHLORIDE 9 MG/ML
INJECTION, SOLUTION INTRAVENOUS CONTINUOUS
Status: DISCONTINUED | OUTPATIENT
Start: 2018-12-07 | End: 2018-12-07

## 2018-12-07 RX ORDER — BISACODYL 10 MG
10 SUPPOSITORY, RECTAL RECTAL
Status: DISCONTINUED | OUTPATIENT
Start: 2018-12-07 | End: 2018-12-07

## 2018-12-07 RX ORDER — HALOPERIDOL 5 MG/ML
1 INJECTION INTRAMUSCULAR
Status: DISCONTINUED | OUTPATIENT
Start: 2018-12-07 | End: 2018-12-07 | Stop reason: HOSPADM

## 2018-12-07 RX ORDER — ONDANSETRON 4 MG/1
4 TABLET, ORALLY DISINTEGRATING ORAL EVERY 4 HOURS PRN
Status: DISCONTINUED | OUTPATIENT
Start: 2018-12-07 | End: 2018-12-07

## 2018-12-07 RX ORDER — DIPHENHYDRAMINE HCL 25 MG
25 TABLET ORAL EVERY 6 HOURS PRN
Status: DISCONTINUED | OUTPATIENT
Start: 2018-12-07 | End: 2018-12-07

## 2018-12-07 RX ORDER — MORPHINE SULFATE 4 MG/ML
4 INJECTION, SOLUTION INTRAMUSCULAR; INTRAVENOUS
Status: DISCONTINUED | OUTPATIENT
Start: 2018-12-07 | End: 2018-12-07

## 2018-12-07 RX ORDER — OXYCODONE HCL 5 MG/5 ML
10 SOLUTION, ORAL ORAL
Status: COMPLETED | OUTPATIENT
Start: 2018-12-07 | End: 2018-12-07

## 2018-12-07 RX ORDER — HYDROMORPHONE HYDROCHLORIDE 1 MG/ML
0.2 INJECTION, SOLUTION INTRAMUSCULAR; INTRAVENOUS; SUBCUTANEOUS
Status: DISCONTINUED | OUTPATIENT
Start: 2018-12-07 | End: 2018-12-07 | Stop reason: HOSPADM

## 2018-12-07 RX ORDER — DEXAMETHASONE SODIUM PHOSPHATE 4 MG/ML
4 INJECTION, SOLUTION INTRA-ARTICULAR; INTRALESIONAL; INTRAMUSCULAR; INTRAVENOUS; SOFT TISSUE EVERY 6 HOURS
Status: COMPLETED | OUTPATIENT
Start: 2018-12-07 | End: 2018-12-08

## 2018-12-07 RX ORDER — HYDRALAZINE HYDROCHLORIDE 20 MG/ML
5 INJECTION INTRAMUSCULAR; INTRAVENOUS
Status: DISCONTINUED | OUTPATIENT
Start: 2018-12-07 | End: 2018-12-07 | Stop reason: HOSPADM

## 2018-12-07 RX ORDER — LABETALOL HYDROCHLORIDE 5 MG/ML
5 INJECTION, SOLUTION INTRAVENOUS
Status: DISCONTINUED | OUTPATIENT
Start: 2018-12-07 | End: 2018-12-07 | Stop reason: HOSPADM

## 2018-12-07 RX ORDER — MEPERIDINE HYDROCHLORIDE 25 MG/ML
12.5 INJECTION INTRAMUSCULAR; INTRAVENOUS; SUBCUTANEOUS
Status: DISCONTINUED | OUTPATIENT
Start: 2018-12-07 | End: 2018-12-07 | Stop reason: HOSPADM

## 2018-12-07 RX ORDER — SODIUM CHLORIDE, SODIUM LACTATE, POTASSIUM CHLORIDE, AND CALCIUM CHLORIDE .6; .31; .03; .02 G/100ML; G/100ML; G/100ML; G/100ML
IRRIGANT IRRIGATION
Status: DISCONTINUED | OUTPATIENT
Start: 2018-12-07 | End: 2018-12-07 | Stop reason: HOSPADM

## 2018-12-07 RX ORDER — MIDAZOLAM HYDROCHLORIDE 1 MG/ML
1 INJECTION INTRAMUSCULAR; INTRAVENOUS
Status: DISCONTINUED | OUTPATIENT
Start: 2018-12-07 | End: 2018-12-07 | Stop reason: HOSPADM

## 2018-12-07 RX ORDER — ONDANSETRON 2 MG/ML
4 INJECTION INTRAMUSCULAR; INTRAVENOUS
Status: DISCONTINUED | OUTPATIENT
Start: 2018-12-07 | End: 2018-12-07 | Stop reason: HOSPADM

## 2018-12-07 RX ORDER — HYDROMORPHONE HYDROCHLORIDE 1 MG/ML
0.1 INJECTION, SOLUTION INTRAMUSCULAR; INTRAVENOUS; SUBCUTANEOUS
Status: DISCONTINUED | OUTPATIENT
Start: 2018-12-07 | End: 2018-12-07 | Stop reason: HOSPADM

## 2018-12-07 RX ORDER — CEFAZOLIN SODIUM 2 G/100ML
2 INJECTION, SOLUTION INTRAVENOUS EVERY 8 HOURS
Status: COMPLETED | OUTPATIENT
Start: 2018-12-07 | End: 2018-12-08

## 2018-12-07 RX ADMIN — ANTACID TABLETS 500 MG: 500 TABLET, CHEWABLE ORAL at 05:20

## 2018-12-07 RX ADMIN — DEXAMETHASONE SODIUM PHOSPHATE 4 MG: 4 INJECTION, SOLUTION INTRAMUSCULAR; INTRAVENOUS at 23:22

## 2018-12-07 RX ADMIN — SODIUM CHLORIDE: 9 INJECTION, SOLUTION INTRAVENOUS at 00:07

## 2018-12-07 RX ADMIN — STANDARDIZED SENNA CONCENTRATE AND DOCUSATE SODIUM 1 TABLET: 8.6; 5 TABLET, FILM COATED ORAL at 21:01

## 2018-12-07 RX ADMIN — OXYCODONE HYDROCHLORIDE 10 MG: 10 TABLET ORAL at 14:13

## 2018-12-07 RX ADMIN — MORPHINE SULFATE 4 MG: 4 INJECTION INTRAVENOUS at 21:01

## 2018-12-07 RX ADMIN — BENZOCAINE AND MENTHOL 1 LOZENGE: 15; 3.6 LOZENGE ORAL at 05:20

## 2018-12-07 RX ADMIN — CIPROFLOXACIN HYDROCHLORIDE 500 MG: 500 TABLET, FILM COATED ORAL at 06:00

## 2018-12-07 RX ADMIN — METAXALONE 800 MG: 800 TABLET ORAL at 17:09

## 2018-12-07 RX ADMIN — POTASSIUM CHLORIDE 20 MEQ: 1500 TABLET, EXTENDED RELEASE ORAL at 06:00

## 2018-12-07 RX ADMIN — FUROSEMIDE 40 MG: 40 TABLET ORAL at 06:00

## 2018-12-07 RX ADMIN — OXYCODONE HYDROCHLORIDE 10 MG: 10 TABLET ORAL at 18:48

## 2018-12-07 RX ADMIN — OXYCODONE HYDROCHLORIDE 10 MG: 5 SOLUTION ORAL at 12:09

## 2018-12-07 RX ADMIN — OXYCODONE HYDROCHLORIDE 10 MG: 10 TABLET ORAL at 23:21

## 2018-12-07 RX ADMIN — ANTACID TABLETS 500 MG: 500 TABLET, CHEWABLE ORAL at 17:09

## 2018-12-07 RX ADMIN — CIPROFLOXACIN HYDROCHLORIDE 500 MG: 500 TABLET, FILM COATED ORAL at 17:09

## 2018-12-07 RX ADMIN — MORPHINE SULFATE 4 MG: 4 INJECTION INTRAVENOUS at 16:24

## 2018-12-07 RX ADMIN — MORPHINE SULFATE 30 MG: 30 TABLET, EXTENDED RELEASE ORAL at 05:20

## 2018-12-07 RX ADMIN — MORPHINE SULFATE 30 MG: 30 TABLET, EXTENDED RELEASE ORAL at 17:09

## 2018-12-07 RX ADMIN — DEXAMETHASONE SODIUM PHOSPHATE 4 MG: 4 INJECTION, SOLUTION INTRAMUSCULAR; INTRAVENOUS at 17:09

## 2018-12-07 RX ADMIN — CEFAZOLIN SODIUM 2 G: 2 INJECTION, SOLUTION INTRAVENOUS at 21:01

## 2018-12-07 RX ADMIN — DOCUSATE SODIUM 100 MG: 100 CAPSULE, LIQUID FILLED ORAL at 17:09

## 2018-12-07 RX ADMIN — METAXALONE 800 MG: 800 TABLET ORAL at 21:02

## 2018-12-07 RX ADMIN — SODIUM CHLORIDE: 9 INJECTION, SOLUTION INTRAVENOUS at 19:19

## 2018-12-07 ASSESSMENT — PAIN SCALES - GENERAL
PAINLEVEL_OUTOF10: 5
PAINLEVEL_OUTOF10: 7
PAINLEVEL_OUTOF10: 10
PAINLEVEL_OUTOF10: ASSUMED PAIN PRESENT
PAINLEVEL_OUTOF10: 5
PAINLEVEL_OUTOF10: 9
PAINLEVEL_OUTOF10: 10
PAINLEVEL_OUTOF10: 10
PAINLEVEL_OUTOF10: 5

## 2018-12-07 NOTE — OR SURGEON
Immediate Post OP Note    PreOp Diagnosis: Cervical Stenosis with Myelopathy    PostOp Diagnosis: Same    Procedure(s):  CERVICAL FUSION POSTERIOR- STAGE #2 C3-5 AND C3-T1 - Wound Class: Clean with Drain  CERVICAL LAMINECTOMY POSTERIOR C2-T1 - Wound Class: Clean with Drain    Surgeon(s):  Emre Mendoza III, M.D.    Anesthesiologist/Type of Anesthesia:  Anesthesiologist: Chema Thurman III, M.D./General    Surgical Staff:  Assistant: Berry Hart P.A.-C.  Circulator: Candace Jackson R.N.  Relief Circulator: Florencia Cervantes R.N.  Scrub Person: Angelita Blackburn R.N.; Celia Quiroz [place]: Mely Santos  Radiology Technologist: Xander Huynh    Specimens removed if any:  * No specimens in log *    Estimated Blood Loss: 50cc    Findings: Severe stenosis C3-4    Complications: None        12/7/2018 11:47 AM Berry Hart P.A.-C.

## 2018-12-07 NOTE — CARE PLAN
Problem: Safety  Goal: Will remain free from falls  Outcome: PROGRESSING AS EXPECTED  Patient will remain free from fall during this shift.  Plan of care discussed with patient. Bed alarm active, call light within reach and patient encouraged to notify staff if assistance is needed.    Problem: Pain Management  Goal: Pain level will decrease to patient's comfort goal  Outcome: PROGRESSING AS EXPECTED  Patient will have decreased c/o pain during this shift.  Pain will be assessed every four hours, patient is aware to notify nurse of pain between assessment and alternative pain relieving methods will be evaluated.

## 2018-12-07 NOTE — PROGRESS NOTES
Pt is A/Ox4. Reports numbness/tinglilng in hands bilaterally. Pt has a hemovac present and compressed to suction. Sx site dressing CDI. Pt has 2 IVs present and patent. Pt has call light within reach, bed locked and in lowest position. Pt has no complaints at this time.

## 2018-12-07 NOTE — OR SURGEON
Immediate Post OP Note    PreOp Diagnosis: Lumbar stenosis with Neurogenic Claudication with Spondylosis    PostOp Diagnosis: Same    Procedure(s):  CERVICAL FUSION POSTERIOR- STAGE #2 C3-5 AND C3-T1 - Wound Class: Clean with Drain  CERVICAL LAMINECTOMY POSTERIOR C2-T1 - Wound Class: Clean with Drain    Surgeon(s):  Emre Mendoza III, M.D.    Anesthesiologist/Type of Anesthesia:  Anesthesiologist: Chema Thurman III, M.D./General    Surgical Staff:  Assistant: Berry Hart P.A.-C.  Circulator: Candace Jackson R.N.  Relief Circulator: Florencia Cervantes R.N.  Scrub Person: Angelita Blackburn R.N.; Celia Quiroz [place]: Mely Santos  Radiology Technologist: Xander Huynh    Specimens removed if any:  * No specimens in log *    Estimated Blood Loss: 200cc    Findings: See Op Note    Complications: None        12/7/2018 3:30 PM Berry Hart P.A.-C.

## 2018-12-07 NOTE — OP REPORT
DATE OF SERVICE:  12/07/2018    PREOPERATIVE DIAGNOSES:  1.  Cervical spondylosis.  2.  Cervical stenosis.  3.  Cervical myelopathy.  4.  Cervical kyphosis.  5.  Cervical spondylolisthesis.  6.  Status post stage 1, C4-C5 corpectomy for initial decompression and deformity correction    POSTOPERATIVE DIAGNOSES:  1.  Cervical spondylosis.  2.  Cervical stenosis.  3.  Cervical myelopathy.  4.  Cervical kyphosis.  5.  Cervical spondylolisthesis.  6.  Status post stage 1, C4-C5 corpectomy for initial decompression and deformity correction    PROCEDURES PERFORMED:    Stage II:    1.  C3, C4 laminectomy decompression of thecal sac and nerve roots.  2.  C2, C3, C4, C5, C6, T1 lateral mass and pedicle screw fixation.  3.  C2, C3, C4, C5, C6, C7, T1 posterolateral allograft/autograft gloria fusion.  4.  Intraoperative monitoring.    SURGEON:  Emre Mendoza III, MD    ASSISTANT:  Berry Hart PA-C    ANESTHESIA:  General.    COMPLICATIONS:  None.    BLOOD LOSS:  50 mL    FINDINGS:  Well decompressed thecal sac.  No change in motors and SSEPs.    DRAINS:  Left subfascial MAYRA.    DISPOSITION:  Extubated to recovery then to floor.    HISTORY OF PRESENT ILLNESS:  This is a 53-year-old woman who had severe   cervical kyphotic deformity with cervical spondylolisthesis and severe   myelopathy due to critical compression.  Her stage I went well and she   improved significant function in the left greater than right side of her body.    She understood the risks and benefits with stage II, including pain,   infection, bleeding, CSF leak, failure to completely resolve symptoms,   neurologic deficits, including pain, weakness, numbness, bowel or bladder   difficulties, failure of fixation, failure of fusion, need for rostral caudal   extensions due to junctional stenosis.  I decided to take this surgery to C2   because the bone was slightly softer, I was going to put in a pars type   screws, short pedicle screws at C2 because of the  CTA result, and the MRI   showed some persistent compression of C3-C4, we felt needed decompression.    SUMMARY OF OPERATIVE PROCEDURE:  Patient was brought to the operating suite,   placed under general anesthesia.  Su catheter already placed.  Lines   secured.  Upper extremity and lower extremity motors and SSEPs were run at   baseline and had weaker signals, but definitely something to follow.  She was   placed in [place] headholder fixation after a central line by anesthesia.    Everything was secured.  She was rolled carefully and with inline precautions   into anatomic prone position, arms in  tuck, everything fixated to   bed, and [place] headholder affixed to the bed.  Repeat motors showed stable   changes in the flip.  We clipped the posterior neck of some of the grades and   mery out a C2-T1 incision.  This was infiltrated with Marcaine with   epinephrine.  Preoperative antibiotics were given.  Proper timeout was   performed.  Patient was prepped and draped in sterile fashion.  Motors were   run every 15 minutes.    A linear incision was made and soft tissues dissected with bipolar and   monopolar electrocautery.  We found the dorsal fascia, incised it sharply and   using subperiosteal techniques, we stripped the muscles off the C2, C3, C4,   C5, C6, C7, and T1 lamina.  We verified levels with x-ray.  We infiltrated the   muscles with Marcaine and turned our attention to decompression.    C3, C4 laminectomy, foraminotomy, decompression of thecal sac and nerve roots:    We used Leksell rongeurs and Brynn bone cutter to remove the spinous   process of C3 and C4.  I then used a Midas Wade drill to drill off the lamina   widely at C3 and C4 and undercut all the way to C2 and C5 and completed that   with Kerrison rongeurs to completely decompress the thecal sac.    C2, C3, C4, C5, C6, T1 lateral mass and pedicle screw fixation:  Using the   streamline OCT system, we cannulated the C2 pars to the  extent of the   vertebral artery foramen by palpating the medial pedicle of C2 and then   parallel to the dorsal pedicle.  We drilled in and then tapped to about 16-20   mm deep, confirmed bony confines with De La Garza probe.  We felt the depths of the   vertebral foramen without injuring it bilaterally and on the left placed   3.5x60 mm screw, on the right a 3.5x20 mm screw.  At left C3, the lateral mass   was very small and washed out, so we could not use it.  On the right C3, we   placed 3.5x12 mm screw using the typical upper outward trajectory.  The   pedicles had extreme slopes.  On the left side was more Magerl technique   and the right more Flori technique just because we could not create enough steep slope without washing out more of the lateral masses.  Bilaterally at C4, C5, C6, we placed 3.5x12 mm self-tapping screws, although we had tapped   intermittently because of the angle.  At T1 on the right, we did not need to   do a laminotomy, as we found the pedicle right away with the appropriate   angle.  We palpated bony confines after we probed with the cervical Lenke   probe.  We put a temporary marker into the pedicles bilaterally and I had to do a   foraminotomy at C7-T1 on the left side because we felt like it was medialized.    The initial AP x-rays looked excellent, but when I tapped, we realized the left T1 was   medially breached.  We redirected more laterally.  We got to a depth of 24 mm   and on the left, placed 3.5x24 mm screw and on the right, a 4.0x24 mm screw.    We did notice the stimulation parameters were 6 milliamps on the left, but   there was a medial breach.  However, we did notice on intermittent motors and   SSEPs, there was a slight 10% drop in the motor signals in the left hand.  We   brought x-ray in.  We confirmed in all trajectories that we were definitively   in the pedicle.  I again palpated the pedicle medially, did not feel any   screw, so I felt this might have just been a  transient irritation.  We had a   well documented radiographic result.  Just with the electrical stimulation, we   decided that that was due to a medial breach or perhaps little mild   irritation of the nerve root.  We decided to continue forward.  We were happy   with all the rest of the construct.    We copiously irrigated with bacitracin infused saline.    C2, C3, C4, C5, C6, C7, T1, posterolateral allograft/autograft gloria fusion:  We   decorticated the facet joints 2-3, 3-4, 4-5, 5-6, 6-7, the lateral mass of C7   and the transverse process of T1.  To that, we laid the patient mixture of   morselized autograft and Progenix plus allograft.  This provided onlay fusion.    This was supplemented with 3.5 lordotic titanium gloria, laid neutral in the   set screws using 's torque device to tighten down the locking   caps.  We did a crosslink.  We were happy with our final construct.    We copiously irrigated again with bacitracin infused saline.  We tunneled out   a 7 Su flat fluted MAYRA and secured to skin with a stitch.  We closed the   wound in anatomic layers, 0 Vicryl for the muscle, 0 Vicryl for the fascia,   2-0 Vicryl for dermis, and staples for the skin.  Sterile dressing was   applied.  Patient was rolled from prone to supine, removed from [place]   headholder without incident, and extubated.    There were no complications.  Needle and sponge count were correct at the end   of the case.       ____________________________________     MD BERTRAND Campoverde III / RICHARD    DD:  12/07/2018 11:40:39  DT:  12/07/2018 13:29:28    D#:  [id number]  Job#:  [id number]

## 2018-12-07 NOTE — OR NURSING
AxOx4. VSS on 1L nc. When asked about pain waves it off. Tolerated water, no nausea. MAYRA with 50cc bloody drainage. Dressing intact, collar in place.  better  Pr Dr Mendoza. Good sensation bilaterally.  Called s/o Contreras and friend Maribell per patient request and updated on status. Phone,  and glasses on chart. Report given to Serenity PHAM

## 2018-12-08 LAB
ANION GAP SERPL CALC-SCNC: 5 MMOL/L (ref 0–11.9)
BUN SERPL-MCNC: 7 MG/DL (ref 8–22)
CALCIUM SERPL-MCNC: 8.6 MG/DL (ref 8.5–10.5)
CHLORIDE SERPL-SCNC: 103 MMOL/L (ref 96–112)
CO2 SERPL-SCNC: 29 MMOL/L (ref 20–33)
CREAT SERPL-MCNC: 0.51 MG/DL (ref 0.5–1.4)
ERYTHROCYTE [DISTWIDTH] IN BLOOD BY AUTOMATED COUNT: 50.8 FL (ref 35.9–50)
GLUCOSE SERPL-MCNC: 119 MG/DL (ref 65–99)
HCT VFR BLD AUTO: 29.9 % (ref 37–47)
HGB BLD-MCNC: 9.8 G/DL (ref 12–16)
MCH RBC QN AUTO: 25.7 PG (ref 27–33)
MCHC RBC AUTO-ENTMCNC: 32.8 G/DL (ref 33.6–35)
MCV RBC AUTO: 78.5 FL (ref 81.4–97.8)
PLATELET # BLD AUTO: 336 K/UL (ref 164–446)
PMV BLD AUTO: 9.8 FL (ref 9–12.9)
POTASSIUM SERPL-SCNC: 3.6 MMOL/L (ref 3.6–5.5)
RBC # BLD AUTO: 3.81 M/UL (ref 4.2–5.4)
SODIUM SERPL-SCNC: 137 MMOL/L (ref 135–145)
WBC # BLD AUTO: 13.2 K/UL (ref 4.8–10.8)

## 2018-12-08 PROCEDURE — 770001 HCHG ROOM/CARE - MED/SURG/GYN PRIV*

## 2018-12-08 PROCEDURE — G8978 MOBILITY CURRENT STATUS: HCPCS | Mod: CL

## 2018-12-08 PROCEDURE — 97166 OT EVAL MOD COMPLEX 45 MIN: CPT

## 2018-12-08 PROCEDURE — G8979 MOBILITY GOAL STATUS: HCPCS | Mod: CI

## 2018-12-08 PROCEDURE — A9270 NON-COVERED ITEM OR SERVICE: HCPCS | Performed by: PHYSICIAN ASSISTANT

## 2018-12-08 PROCEDURE — 97162 PT EVAL MOD COMPLEX 30 MIN: CPT

## 2018-12-08 PROCEDURE — G8988 SELF CARE GOAL STATUS: HCPCS | Mod: CJ

## 2018-12-08 PROCEDURE — 700102 HCHG RX REV CODE 250 W/ 637 OVERRIDE(OP): Performed by: PHYSICIAN ASSISTANT

## 2018-12-08 PROCEDURE — 700111 HCHG RX REV CODE 636 W/ 250 OVERRIDE (IP): Performed by: PHYSICIAN ASSISTANT

## 2018-12-08 PROCEDURE — G8987 SELF CARE CURRENT STATUS: HCPCS | Mod: CK

## 2018-12-08 PROCEDURE — 700112 HCHG RX REV CODE 229: Performed by: PHYSICIAN ASSISTANT

## 2018-12-08 PROCEDURE — 85027 COMPLETE CBC AUTOMATED: CPT

## 2018-12-08 PROCEDURE — 80048 BASIC METABOLIC PNL TOTAL CA: CPT

## 2018-12-08 RX ADMIN — ENOXAPARIN SODIUM 40 MG: 100 INJECTION SUBCUTANEOUS at 15:23

## 2018-12-08 RX ADMIN — ALPRAZOLAM 0.5 MG: 0.5 TABLET ORAL at 22:53

## 2018-12-08 RX ADMIN — METAXALONE 800 MG: 800 TABLET ORAL at 13:49

## 2018-12-08 RX ADMIN — MORPHINE SULFATE 30 MG: 30 TABLET, EXTENDED RELEASE ORAL at 04:11

## 2018-12-08 RX ADMIN — OXYCODONE HYDROCHLORIDE 10 MG: 10 TABLET ORAL at 15:20

## 2018-12-08 RX ADMIN — BENZOCAINE AND MENTHOL 1 LOZENGE: 15; 3.6 LOZENGE ORAL at 08:37

## 2018-12-08 RX ADMIN — ANTACID TABLETS 500 MG: 500 TABLET, CHEWABLE ORAL at 17:35

## 2018-12-08 RX ADMIN — FUROSEMIDE 40 MG: 40 TABLET ORAL at 04:11

## 2018-12-08 RX ADMIN — HYDROMORPHONE HYDROCHLORIDE 0.5 MG: 1 INJECTION, SOLUTION INTRAMUSCULAR; INTRAVENOUS; SUBCUTANEOUS at 09:39

## 2018-12-08 RX ADMIN — ANTACID TABLETS 500 MG: 500 TABLET, CHEWABLE ORAL at 04:11

## 2018-12-08 RX ADMIN — MORPHINE SULFATE 4 MG: 4 INJECTION INTRAVENOUS at 14:37

## 2018-12-08 RX ADMIN — CIPROFLOXACIN HYDROCHLORIDE 500 MG: 500 TABLET, FILM COATED ORAL at 04:11

## 2018-12-08 RX ADMIN — DOCUSATE SODIUM 100 MG: 100 CAPSULE, LIQUID FILLED ORAL at 04:11

## 2018-12-08 RX ADMIN — CIPROFLOXACIN HYDROCHLORIDE 500 MG: 500 TABLET, FILM COATED ORAL at 18:00

## 2018-12-08 RX ADMIN — OXYCODONE HYDROCHLORIDE 10 MG: 10 TABLET ORAL at 20:56

## 2018-12-08 RX ADMIN — HYDROMORPHONE HYDROCHLORIDE 0.5 MG: 1 INJECTION, SOLUTION INTRAMUSCULAR; INTRAVENOUS; SUBCUTANEOUS at 17:46

## 2018-12-08 RX ADMIN — DOCUSATE SODIUM 100 MG: 100 CAPSULE, LIQUID FILLED ORAL at 17:34

## 2018-12-08 RX ADMIN — OXYCODONE HYDROCHLORIDE 10 MG: 10 TABLET ORAL at 07:17

## 2018-12-08 RX ADMIN — METAXALONE 800 MG: 800 TABLET ORAL at 20:55

## 2018-12-08 RX ADMIN — MORPHINE SULFATE 30 MG: 30 TABLET, EXTENDED RELEASE ORAL at 17:34

## 2018-12-08 RX ADMIN — METAXALONE 800 MG: 800 TABLET ORAL at 08:33

## 2018-12-08 RX ADMIN — BENZOCAINE AND MENTHOL 1 LOZENGE: 15; 3.6 LOZENGE ORAL at 15:23

## 2018-12-08 RX ADMIN — CEFAZOLIN SODIUM 2 G: 2 INJECTION, SOLUTION INTRAVENOUS at 04:11

## 2018-12-08 RX ADMIN — VITAMIN D, TAB 1000IU (100/BT) 5000 UNITS: 25 TAB at 04:10

## 2018-12-08 RX ADMIN — HYDROMORPHONE HYDROCHLORIDE 0.5 MG: 1 INJECTION, SOLUTION INTRAMUSCULAR; INTRAVENOUS; SUBCUTANEOUS at 04:12

## 2018-12-08 RX ADMIN — STANDARDIZED SENNA CONCENTRATE AND DOCUSATE SODIUM 1 TABLET: 8.6; 5 TABLET, FILM COATED ORAL at 20:56

## 2018-12-08 RX ADMIN — CYANOCOBALAMIN TAB 500 MCG 1000 MCG: 500 TAB at 04:11

## 2018-12-08 RX ADMIN — OXYCODONE HYDROCHLORIDE 10 MG: 10 TABLET ORAL at 02:13

## 2018-12-08 RX ADMIN — METAXALONE 800 MG: 800 TABLET ORAL at 17:00

## 2018-12-08 RX ADMIN — OXYCODONE HYDROCHLORIDE 10 MG: 10 TABLET ORAL at 12:17

## 2018-12-08 RX ADMIN — MORPHINE SULFATE 4 MG: 4 INJECTION INTRAVENOUS at 22:53

## 2018-12-08 RX ADMIN — POTASSIUM CHLORIDE 20 MEQ: 1500 TABLET, EXTENDED RELEASE ORAL at 04:11

## 2018-12-08 RX ADMIN — DEXAMETHASONE SODIUM PHOSPHATE 4 MG: 4 INJECTION, SOLUTION INTRAMUSCULAR; INTRAVENOUS at 04:11

## 2018-12-08 RX ADMIN — THERA TABS 1 TABLET: TAB at 04:11

## 2018-12-08 RX ADMIN — HYDROMORPHONE HYDROCHLORIDE 0.5 MG: 1 INJECTION, SOLUTION INTRAMUSCULAR; INTRAVENOUS; SUBCUTANEOUS at 13:34

## 2018-12-08 ASSESSMENT — GAIT ASSESSMENTS
ASSISTIVE DEVICE: FRONT WHEEL WALKER
GAIT LEVEL OF ASSIST: MODERATE ASSIST
DEVIATION: DECREASED BASE OF SUPPORT;DECREASED HEEL STRIKE;DECREASED TOE OFF;BRADYKINETIC
DISTANCE (FEET): 4

## 2018-12-08 ASSESSMENT — COGNITIVE AND FUNCTIONAL STATUS - GENERAL
WALKING IN HOSPITAL ROOM: A LOT
EATING MEALS: A LITTLE
MOBILITY SCORE: 11
DRESSING REGULAR UPPER BODY CLOTHING: A LOT
STANDING UP FROM CHAIR USING ARMS: A LOT
SUGGESTED CMS G CODE MODIFIER MOBILITY: CL
TURNING FROM BACK TO SIDE WHILE IN FLAT BAD: A LOT
MOVING FROM LYING ON BACK TO SITTING ON SIDE OF FLAT BED: A LOT
TOILETING: A LOT
MOVING TO AND FROM BED TO CHAIR: A LOT
DAILY ACTIVITIY SCORE: 12
CLIMB 3 TO 5 STEPS WITH RAILING: TOTAL
SUGGESTED CMS G CODE MODIFIER DAILY ACTIVITY: CL
HELP NEEDED FOR BATHING: A LOT
PERSONAL GROOMING: A LOT
DRESSING REGULAR LOWER BODY CLOTHING: TOTAL

## 2018-12-08 ASSESSMENT — PAIN SCALES - GENERAL
PAINLEVEL_OUTOF10: 9
PAINLEVEL_OUTOF10: 8
PAINLEVEL_OUTOF10: 10
PAINLEVEL_OUTOF10: 8
PAINLEVEL_OUTOF10: 8
PAINLEVEL_OUTOF10: 9
PAINLEVEL_OUTOF10: 10

## 2018-12-08 ASSESSMENT — ACTIVITIES OF DAILY LIVING (ADL): TOILETING: INDEPENDENT

## 2018-12-08 NOTE — DISCHARGE PLANNING
PMR order received from ROMA Blancas.  PEBP is shown for her medical provider.  POD #3 C3-6 ACFD with C4 and C5 corpectomy and cage.  POD#1 C3-5 lami with C3-T1 fusion. Lives alone. 1LV/3ST.  IV pain/BP meds are a barrier to RIRF.  Physiatry to consult.  I do appreciate the referral.

## 2018-12-08 NOTE — THERAPY
"Physical Therapy Treatment completed.   Bed Mobility:  Supine to Sit:  (NT seated EOB w/ physical therapy upon arrival )  Transfers: Sit to Stand: Moderate Assist  Gait: Level Of Assist: Moderate Assist (x 2) with HHA        Plan of Care: Will benefit from Physical Therapy 5 times per week  Discharge Recommendations: Equipment: Will Continue to Assess for Equipment Needs. Post-acute therapy Discharge to a transitional care facility for continued skilled therapy services.     See \"Rehab Therapy-Acute\" Patient Summary Report for complete documentation.       "

## 2018-12-08 NOTE — PROGRESS NOTES
Neurosurgery Progress Note    Subjective:  POD #3 C3-6 ACFD with C4 and C5 corpectomy and cage.  POD#1 C3-5 lami with C3-T1 fusion.  Pain controlled.  Reports some bilateral UE paresthesia, no radicular pain.  Voice fine, swallowing uncomfortable.  Drain with 70 cc output.  Lives alone.    Exam:  Wound C/D/I   Strength /5 in leggs  Arms  4/5 on Right 3-4/5 on Left.   Sensory improved  Swallowing well with mild soreness.  Breathing and speaking well.      BP  Min: 118/77  Max: 158/107  Pulse  Av  Min: 75  Max: 89  Resp  Av.3  Min: 10  Max: 23  Temp  Av.3 °C (97.4 °F)  Min: 35.8 °C (96.5 °F)  Max: 36.9 °C (98.4 °F)  SpO2  Av.1 %  Min: 91 %  Max: 100 %    No Data Recorded    Recent Labs      18   0430   WBC  13.2*   RBC  3.81*   HEMOGLOBIN  9.8*   HEMATOCRIT  29.9*   MCV  78.5*   MCH  25.7*   MCHC  32.8*   RDW  50.8*   PLATELETCT  336   MPV  9.8     Recent Labs      18   0430   SODIUM  137   POTASSIUM  3.6   CHLORIDE  103   CO2  29   GLUCOSE  119*   BUN  7*   CREATININE  0.51   CALCIUM  8.6               Intake/Output       18 0700 - 18 0659 18 0700 - 18 0659      2067-4359 9115-1019 Total 5053-2239 3488-0282 Total       Intake    P.O.  50  -- 50  --  -- --    P.O. 50 -- 50 -- -- --    I.V.  1600  -- 1600  --  -- --    Crystalloid Intake 1600 -- 1600 -- -- --    Total Intake 1650 -- 1650 -- -- --       Output    Urine  3750  900 4650  --  -- --    Urine Void (mL) 2350 -- 2350 -- -- --    Output (mL) (Urethral Catheter Latex) 5810 027 0293 -- -- --    Drains  50  135 185  --  -- --    Output (mL) (Closed/Suction Drain 1 Posterior Neck Nash Lopes) 50 135 185 -- -- --    Blood  50  -- 50  --  -- --    Est. Blood Loss (mL) 50 -- 50 -- -- --    Total Output 8922 8335 8348 -- -- --       Net I/O     -2465 -4418 -5804 -- -- --            Intake/Output Summary (Last 24 hours) at 18 1133  Last data filed at 18 0400   Gross per 24 hour   Intake                50 ml   Output             2035 ml   Net            -1985 ml            • NS   Continuous   • morphine injection  4 mg Q3HRS PRN   • diphenhydrAMINE  25 mg Q6HRS PRN   • enoxaparin  40 mg DAILY   • ALPRAZolam  0.5 mg TID PRN   • neomycin/colistin/thonz/HC  5 Drop 4X/DAY   • ciprofloxacin  500 mg BID   • cyanocobalamin  1,000 mcg DAILY   • furosemide  40 mg DAILY   • metaxalone  800 mg 4X/DAY   • morphine ER  30 mg Q12HRS   • multivitamin  1 Tab DAILY   • potassium chloride SA  20 mEq DAILY   • Pharmacy Consult Request  1 Each PRN   • MD ALERT...DO NOT ADMINISTER NSAIDS or ASPIRIN unless ORDERED By Neurosurgery  1 Each PRN   • docusate sodium  100 mg BID   • senna-docusate  1 Tab Nightly   • senna-docusate  1 Tab Q24HRS PRN   • polyethylene glycol/lytes  1 Packet BID PRN   • magnesium hydroxide  30 mL QDAY PRN   • bisacodyl  10 mg Q24HRS PRN   • fleet  1 Each Once PRN   • NS   Continuous   • oxyCODONE immediate release  10 mg Q3HRS PRN   • oxyCODONE immediate-release  5 mg Q3HRS PRN   • HYDROmorphone  0.5 mg Q3HRS PRN   • ondansetron  4 mg Q4HRS PRN   • ondansetron  4 mg Q4HRS PRN   • promethazine  12.5-25 mg Q4HRS PRN   • promethazine  12.5-25 mg Q4HRS PRN   • prochlorperazine  5-10 mg Q4HRS PRN   • benzocaine-menthol  1 Lozenge Q2HRS PRN   • calcium carbonate  500 mg BID   • vitamin D  5,000 Units DAILY   • hydrALAZINE  10 mg Q HOUR PRN       Assessment and Plan:  POD #3 C3-6 ACFD with C4 and C5 corpectomy and cage  POD#1 C3-5 lami with C3-T1 fusion.  PT/OT/ambulate as tolerated.  Leave drain today.  Will likely need rehab d/t h/o myelopathy, rehab consult today.       Prophylactic anticoagulation: yes       Start date/time: saturday

## 2018-12-08 NOTE — PROGRESS NOTES
Jose COREY made aware that the patient is refusing pérez to be dc'd today because she isn't moving well and is on lasix. Jose COREY agreeable to keep the pérez one more day but it must be dc'd tomorrow at 0600 on 12/9/18. Pt agreeable.

## 2018-12-08 NOTE — CARE PLAN
Problem: Safety  Goal: Will remain free from falls  Outcome: PROGRESSING AS EXPECTED  Pt educated to call before attempting to get to chair. Bed alarm on and call light with in reach.    Problem: Venous Thromboembolism (VTW)/Deep Vein Thrombosis (DVT) Prevention:  Goal: Patient will participate in Venous Thrombosis (VTE)/Deep Vein Thrombosis (DVT)Prevention Measures  Outcome: PROGRESSING AS EXPECTED

## 2018-12-08 NOTE — PROGRESS NOTES
Pt refused removal of pérez. Pt requesting to keep in due to being on lasix and difficulty getting up. Pt educated about risk for infection but continues to refuse.

## 2018-12-08 NOTE — PROGRESS NOTES
Assumed pt care at 1855. Received report from Danyel PHAM. Assessment completed. Pt A&Ox4. Pain 3/10. Bed in lowest position, locked, and bed alarm is on. Pt calls appropriately. Treaded socks in place, call light within reach and staff numbers provided. Pt needs met at this time.

## 2018-12-08 NOTE — THERAPY
"Occupational Therapy Evaluation completed.   Functional Status:  Total A LB dressing, Max A UB dressing, CGA self feeding, Mod A x2 stand pivot EOB>Chair   Plan of Care: Will benefit from Occupational Therapy 3 times per week  Discharge Recommendations:  Equipment: Will Continue to Assess for Equipment Needs. Post-acute therapy Recommend inpatient transitional care services for continued occupational therapy services. Please consider physiatry (PM&R) consult.     See \"Rehab Therapy-Acute\" Patient Summary Report for complete documentation.    Pt is a 52 y/o female s/p C3-5 Lami w/ C3-T1 fusion, C3-6 ACFD w/ C4 and C5 corpectomy w/ cage. Pt reports she lives alone in an apt and is normally independent w/ BADLs and IADLs. Pt reports she is primarily w/c level due to leg length discrepancy. Pt demo'd impaired B UE strength, coordination, and sensation, pt also demo'd impaired balance, functional mobility, and endurance impacting functional independence. Will follow for Acute OT services. Recommend subacute placement prior to DC home for continued inpt therapy services.     "

## 2018-12-09 ENCOUNTER — APPOINTMENT (OUTPATIENT)
Dept: RADIOLOGY | Facility: MEDICAL CENTER | Age: 53
DRG: 454 | End: 2018-12-09
Attending: PHYSICIAN ASSISTANT
Payer: COMMERCIAL

## 2018-12-09 LAB
ANION GAP SERPL CALC-SCNC: 8 MMOL/L (ref 0–11.9)
BUN SERPL-MCNC: 7 MG/DL (ref 8–22)
CALCIUM SERPL-MCNC: 8.7 MG/DL (ref 8.5–10.5)
CHLORIDE SERPL-SCNC: 103 MMOL/L (ref 96–112)
CO2 SERPL-SCNC: 28 MMOL/L (ref 20–33)
CREAT SERPL-MCNC: 0.45 MG/DL (ref 0.5–1.4)
ERYTHROCYTE [DISTWIDTH] IN BLOOD BY AUTOMATED COUNT: 49.2 FL (ref 35.9–50)
GLUCOSE SERPL-MCNC: 89 MG/DL (ref 65–99)
HCT VFR BLD AUTO: 30.3 % (ref 37–47)
HGB BLD-MCNC: 10.4 G/DL (ref 12–16)
MCH RBC QN AUTO: 26.5 PG (ref 27–33)
MCHC RBC AUTO-ENTMCNC: 34.3 G/DL (ref 33.6–35)
MCV RBC AUTO: 77.1 FL (ref 81.4–97.8)
PLATELET # BLD AUTO: 323 K/UL (ref 164–446)
PMV BLD AUTO: 9.8 FL (ref 9–12.9)
POTASSIUM SERPL-SCNC: 3.2 MMOL/L (ref 3.6–5.5)
RBC # BLD AUTO: 3.93 M/UL (ref 4.2–5.4)
SODIUM SERPL-SCNC: 139 MMOL/L (ref 135–145)
WBC # BLD AUTO: 10.7 K/UL (ref 4.8–10.8)

## 2018-12-09 PROCEDURE — A9270 NON-COVERED ITEM OR SERVICE: HCPCS | Performed by: PHYSICIAN ASSISTANT

## 2018-12-09 PROCEDURE — 72040 X-RAY EXAM NECK SPINE 2-3 VW: CPT

## 2018-12-09 PROCEDURE — 700102 HCHG RX REV CODE 250 W/ 637 OVERRIDE(OP): Performed by: PHYSICIAN ASSISTANT

## 2018-12-09 PROCEDURE — 85027 COMPLETE CBC AUTOMATED: CPT

## 2018-12-09 PROCEDURE — 97530 THERAPEUTIC ACTIVITIES: CPT

## 2018-12-09 PROCEDURE — 700111 HCHG RX REV CODE 636 W/ 250 OVERRIDE (IP): Performed by: PHYSICIAN ASSISTANT

## 2018-12-09 PROCEDURE — 97110 THERAPEUTIC EXERCISES: CPT

## 2018-12-09 PROCEDURE — 700102 HCHG RX REV CODE 250 W/ 637 OVERRIDE(OP): Performed by: NEUROLOGICAL SURGERY

## 2018-12-09 PROCEDURE — A9270 NON-COVERED ITEM OR SERVICE: HCPCS | Performed by: NEUROLOGICAL SURGERY

## 2018-12-09 PROCEDURE — 700112 HCHG RX REV CODE 229: Performed by: PHYSICIAN ASSISTANT

## 2018-12-09 PROCEDURE — 770001 HCHG ROOM/CARE - MED/SURG/GYN PRIV*

## 2018-12-09 PROCEDURE — 80048 BASIC METABOLIC PNL TOTAL CA: CPT

## 2018-12-09 RX ORDER — METHOCARBAMOL 500 MG/1
1000 TABLET, FILM COATED ORAL 4 TIMES DAILY
Status: DISCONTINUED | OUTPATIENT
Start: 2018-12-09 | End: 2018-12-11 | Stop reason: HOSPADM

## 2018-12-09 RX ORDER — OXYCODONE HYDROCHLORIDE 5 MG/1
15 TABLET ORAL
Status: DISCONTINUED | OUTPATIENT
Start: 2018-12-09 | End: 2018-12-11 | Stop reason: HOSPADM

## 2018-12-09 RX ORDER — POTASSIUM CHLORIDE 20 MEQ/1
20 TABLET, EXTENDED RELEASE ORAL ONCE
Status: COMPLETED | OUTPATIENT
Start: 2018-12-09 | End: 2018-12-09

## 2018-12-09 RX ORDER — GABAPENTIN 300 MG/1
300 CAPSULE ORAL 3 TIMES DAILY
Status: DISCONTINUED | OUTPATIENT
Start: 2018-12-09 | End: 2018-12-11 | Stop reason: HOSPADM

## 2018-12-09 RX ADMIN — GABAPENTIN 300 MG: 300 CAPSULE ORAL at 12:18

## 2018-12-09 RX ADMIN — FUROSEMIDE 40 MG: 40 TABLET ORAL at 04:03

## 2018-12-09 RX ADMIN — STANDARDIZED SENNA CONCENTRATE AND DOCUSATE SODIUM 1 TABLET: 8.6; 5 TABLET, FILM COATED ORAL at 22:18

## 2018-12-09 RX ADMIN — POTASSIUM CHLORIDE 20 MEQ: 1500 TABLET, EXTENDED RELEASE ORAL at 22:38

## 2018-12-09 RX ADMIN — THERA TABS 1 TABLET: TAB at 04:03

## 2018-12-09 RX ADMIN — MORPHINE SULFATE 30 MG: 30 TABLET, EXTENDED RELEASE ORAL at 05:11

## 2018-12-09 RX ADMIN — DOCUSATE SODIUM 100 MG: 100 CAPSULE, LIQUID FILLED ORAL at 04:03

## 2018-12-09 RX ADMIN — CIPROFLOXACIN HYDROCHLORIDE 500 MG: 500 TABLET, FILM COATED ORAL at 04:03

## 2018-12-09 RX ADMIN — OXYCODONE HYDROCHLORIDE 10 MG: 10 TABLET ORAL at 04:02

## 2018-12-09 RX ADMIN — POTASSIUM CHLORIDE 20 MEQ: 1500 TABLET, EXTENDED RELEASE ORAL at 04:03

## 2018-12-09 RX ADMIN — VITAMIN D, TAB 1000IU (100/BT) 5000 UNITS: 25 TAB at 04:02

## 2018-12-09 RX ADMIN — CYANOCOBALAMIN TAB 500 MCG 1000 MCG: 500 TAB at 04:03

## 2018-12-09 RX ADMIN — MORPHINE SULFATE 4 MG: 4 INJECTION INTRAVENOUS at 05:11

## 2018-12-09 RX ADMIN — MORPHINE SULFATE 4 MG: 4 INJECTION INTRAVENOUS at 02:12

## 2018-12-09 RX ADMIN — MORPHINE SULFATE 30 MG: 30 TABLET, EXTENDED RELEASE ORAL at 17:26

## 2018-12-09 RX ADMIN — BENZOCAINE AND MENTHOL 1 LOZENGE: 15; 3.6 LOZENGE ORAL at 12:17

## 2018-12-09 RX ADMIN — OXYCODONE HYDROCHLORIDE 10 MG: 10 TABLET ORAL at 08:15

## 2018-12-09 RX ADMIN — CIPROFLOXACIN HYDROCHLORIDE 500 MG: 500 TABLET, FILM COATED ORAL at 17:27

## 2018-12-09 RX ADMIN — DOCUSATE SODIUM 100 MG: 100 CAPSULE, LIQUID FILLED ORAL at 17:27

## 2018-12-09 RX ADMIN — ENOXAPARIN SODIUM 40 MG: 100 INJECTION SUBCUTANEOUS at 04:03

## 2018-12-09 RX ADMIN — ANTACID TABLETS 500 MG: 500 TABLET, CHEWABLE ORAL at 04:03

## 2018-12-09 RX ADMIN — OXYCODONE HYDROCHLORIDE 10 MG: 10 TABLET ORAL at 01:04

## 2018-12-09 RX ADMIN — METHOCARBAMOL 1000 MG: 500 TABLET, FILM COATED ORAL at 22:18

## 2018-12-09 RX ADMIN — HYDRALAZINE HYDROCHLORIDE 10 MG: 20 INJECTION INTRAMUSCULAR; INTRAVENOUS at 00:26

## 2018-12-09 RX ADMIN — OXYCODONE HYDROCHLORIDE 15 MG: 5 TABLET ORAL at 12:19

## 2018-12-09 RX ADMIN — ANTACID TABLETS 500 MG: 500 TABLET, CHEWABLE ORAL at 17:27

## 2018-12-09 RX ADMIN — OXYCODONE HYDROCHLORIDE 15 MG: 5 TABLET ORAL at 18:47

## 2018-12-09 RX ADMIN — METHOCARBAMOL 1000 MG: 500 TABLET, FILM COATED ORAL at 17:26

## 2018-12-09 RX ADMIN — METHOCARBAMOL 1000 MG: 500 TABLET, FILM COATED ORAL at 10:17

## 2018-12-09 RX ADMIN — OXYCODONE HYDROCHLORIDE 15 MG: 5 TABLET ORAL at 23:27

## 2018-12-09 RX ADMIN — GABAPENTIN 300 MG: 300 CAPSULE ORAL at 17:27

## 2018-12-09 RX ADMIN — BENZOCAINE AND MENTHOL 1 LOZENGE: 15; 3.6 LOZENGE ORAL at 04:01

## 2018-12-09 ASSESSMENT — COGNITIVE AND FUNCTIONAL STATUS - GENERAL
MOVING FROM LYING ON BACK TO SITTING ON SIDE OF FLAT BED: UNABLE
STANDING UP FROM CHAIR USING ARMS: A LOT
MOBILITY SCORE: 8
CLIMB 3 TO 5 STEPS WITH RAILING: TOTAL
TURNING FROM BACK TO SIDE WHILE IN FLAT BAD: UNABLE
MOVING TO AND FROM BED TO CHAIR: UNABLE
WALKING IN HOSPITAL ROOM: A LOT
SUGGESTED CMS G CODE MODIFIER MOBILITY: CM

## 2018-12-09 ASSESSMENT — PAIN SCALES - GENERAL
PAINLEVEL_OUTOF10: 10
PAINLEVEL_OUTOF10: 9
PAINLEVEL_OUTOF10: 10
PAINLEVEL_OUTOF10: 9
PAINLEVEL_OUTOF10: 10

## 2018-12-09 ASSESSMENT — GAIT ASSESSMENTS
DISTANCE (FEET): 2
DEVIATION: ATAXIC;DECREASED BASE OF SUPPORT;BRADYKINETIC;SHUFFLED GAIT
GAIT LEVEL OF ASSIST: MAXIMAL ASSIST
ASSISTIVE DEVICE: FRONT WHEEL WALKER

## 2018-12-09 NOTE — PROGRESS NOTES
Neurosurgery Progress Note    Subjective:  POD #4 C3-6 ACFD with C4 and C5 corpectomy and cage.  POD#2 C3-5 lami with C3-T1 fusion.  Pain not well controlled and requiring IV morphine.  Reports some bilateral UE paresthesia, no radicular pain.  Voice fine, swallowing uncomfortable.  Drain with 10 cc output.  Rehab has indicated that she would be a good candidate for rehab, but needs to be off IV pain meds for 24 hours prior to transfer.     Exam:  Wound C/D/I   Arms diffusely weak, but equal bilaterally.  Sensory improved  Swallowing well with mild soreness.  Breathing and speaking well.      BP  Min: 135/80  Max: 165/91  Pulse  Av.3  Min: 87  Max: 102  Resp  Av.2  Min: 16  Max: 18  Temp  Av.8 °C (98.3 °F)  Min: 36.7 °C (98 °F)  Max: 37.1 °C (98.7 °F)  SpO2  Av.2 %  Min: 95 %  Max: 100 %    No Data Recorded    Recent Labs      18   0430  18   0230   WBC  13.2*  10.7   RBC  3.81*  3.93*   HEMOGLOBIN  9.8*  10.4*   HEMATOCRIT  29.9*  30.3*   MCV  78.5*  77.1*   MCH  25.7*  26.5*   MCHC  32.8*  34.3   RDW  50.8*  49.2   PLATELETCT  336  323   MPV  9.8  9.8     Recent Labs      18   0430  18   0230   SODIUM  137  139   POTASSIUM  3.6  3.2*   CHLORIDE  103  103   CO2  29  28   GLUCOSE  119*  89   BUN  7*  7*   CREATININE  0.51  0.45*   CALCIUM  8.6  8.7               Intake/Output       18 - 18 0659 18 - 12/10/18 0659       Total [id number] Total       Intake    P.O.  900  480 1380  --  -- --    P.O.  -- -- --    I.V.  --  240 240  --  -- --    Volume (mL) (NS infusion) -- 240 240 -- -- --    Total Intake  -- -- --       Output    Urine  1050  1150 2200  --  -- --    Number of Times Voided -- 1 x 1 x -- -- --    Output (mL) ([REMOVED] Urethral Catheter Latex) 1050 1150 2200 -- -- --    Drains  10  30 40  --  -- --    Output (mL) (Closed/Suction Drain 1 Posterior Neck Nash Lopes) 10 30 40 -- -- --     Stool  --  -- --  --  -- --    Number of Times Stooled -- -- -- 0 x -- 0 x    Total Output 1060 1180 2240 -- -- --       Net I/O     -160 460 -620 -- -- --            Intake/Output Summary (Last 24 hours) at 12/09/18 0940  Last data filed at 12/09/18 0600   Gross per 24 hour   Intake             1220 ml   Output             2240 ml   Net            -1020 ml            • methocarbamol  1,000 mg 4X/DAY   • gabapentin  300 mg TID   • NS   Continuous   • morphine injection  4 mg Q3HRS PRN   • diphenhydrAMINE  25 mg Q6HRS PRN   • enoxaparin  40 mg DAILY   • ALPRAZolam  0.5 mg TID PRN   • neomycin/colistin/thonz/HC  5 Drop 4X/DAY   • ciprofloxacin  500 mg BID   • cyanocobalamin  1,000 mcg DAILY   • furosemide  40 mg DAILY   • morphine ER  30 mg Q12HRS   • multivitamin  1 Tab DAILY   • potassium chloride SA  20 mEq DAILY   • Pharmacy Consult Request  1 Each PRN   • MD ALERT...DO NOT ADMINISTER NSAIDS or ASPIRIN unless ORDERED By Neurosurgery  1 Each PRN   • docusate sodium  100 mg BID   • senna-docusate  1 Tab Nightly   • senna-docusate  1 Tab Q24HRS PRN   • polyethylene glycol/lytes  1 Packet BID PRN   • magnesium hydroxide  30 mL QDAY PRN   • bisacodyl  10 mg Q24HRS PRN   • fleet  1 Each Once PRN   • NS   Continuous   • oxyCODONE immediate release  10 mg Q3HRS PRN   • oxyCODONE immediate-release  5 mg Q3HRS PRN   • HYDROmorphone  0.5 mg Q3HRS PRN   • ondansetron  4 mg Q4HRS PRN   • ondansetron  4 mg Q4HRS PRN   • promethazine  12.5-25 mg Q4HRS PRN   • promethazine  12.5-25 mg Q4HRS PRN   • prochlorperazine  5-10 mg Q4HRS PRN   • benzocaine-menthol  1 Lozenge Q2HRS PRN   • calcium carbonate  500 mg BID   • vitamin D  5,000 Units DAILY   • hydrALAZINE  10 mg Q HOUR PRN       Assessment and Plan:  POD #4 C3-6 ACFD with C4 and C5 corpectomy and cage  POD#2 C3-5 lami with C3-T1 fusion.  PT/OT/ambulate as tolerated.  D/c drain.  Stop all IV pain meds, will adjust muscle relaxant and oral pain meds.  Likely ok for transfer  to rehab tomorrow when pain better controlled on oral meds.        Prophylactic anticoagulation: yes       Start date/time: saturday

## 2018-12-09 NOTE — CARE PLAN
Problem: Safety  Goal: Will remain free from injury  Outcome: PROGRESSING AS EXPECTED  Pt has remained free from injury this visit. Pt calls for help appropriately.    Problem: Infection  Goal: Will remain free from infection  Outcome: PROGRESSING AS EXPECTED  Pt WBC elevated from previous labs. May be d/t surgery, however orders are in place to remove pérez, this has been discussed with the pt and pt aware that pérez will be removed by 0600. MD aware that pt is reluctant to remove pérez, per day RN. Pt has a central line present as well, may discuss with MD to remove and get PIV in place.

## 2018-12-09 NOTE — CARE PLAN
Problem: Venous Thromboembolism (VTW)/Deep Vein Thrombosis (DVT) Prevention:  Goal: Patient will participate in Venous Thrombosis (VTE)/Deep Vein Thrombosis (DVT)Prevention Measures    Intervention: Ensure patient wears graduated elastic stockings (SHONNA hose) and/or SCDs, if ordered, when in bed or chair (Remove at least once per shift for skin check)  Patient wearing her SCDs and given daily Lovenox. Pt educated on importance of wearing SCDs and Lovenox shot.      Problem: Pain Management  Goal: Pain level will decrease to patient's comfort goal    Intervention: Follow pain managment plan developed in collaboration with patient and Interdisciplinary Team  Pain medication given as prescribed. Pt calls appropriately for pain medication. The patient is frequently turned and repositioned for comfort. Patient also given 2 heat packs for her shoulders that have decreased her pain.

## 2018-12-09 NOTE — DISCHARGE PLANNING
Please review the consult from Dr. Lee for Physiatry recommendations. Needs to be off IV pain meds for 24 hours prior to transfer. TCC remains monitoring.

## 2018-12-09 NOTE — PROGRESS NOTES
Su catheter leaking, pt had an incontinent episode. Su was removed per order and output was documented. Pt linens changed, gomez care provided. Pt has until 10 am to void or a bladder scan will be initiated per MD order.

## 2018-12-09 NOTE — PROGRESS NOTES
Pt A/Ox4. Reports numbness/tingling in her bilateral upper extremities as well as nerve pain in her right arm. Pt has Topaz call on at all times. Sx site dressing is C/D/I, MAYRA drain present. MAYRA drain reportedly leaking, MD is aware according to day RN. Pt has SCDS on. No s/s of skin breakdown. Pt has call light within reach, bed alarm on. Pérez is in place and pt has been educated on need to remove pérez by 6 am, pt verbalizes understanding.

## 2018-12-10 LAB
ANION GAP SERPL CALC-SCNC: 6 MMOL/L (ref 0–11.9)
BASOPHILS # BLD AUTO: 0.5 % (ref 0–1.8)
BASOPHILS # BLD: 0.05 K/UL (ref 0–0.12)
BUN SERPL-MCNC: 8 MG/DL (ref 8–22)
CALCIUM SERPL-MCNC: 8.7 MG/DL (ref 8.5–10.5)
CHLORIDE SERPL-SCNC: 101 MMOL/L (ref 96–112)
CO2 SERPL-SCNC: 30 MMOL/L (ref 20–33)
CREAT SERPL-MCNC: 0.55 MG/DL (ref 0.5–1.4)
EOSINOPHIL # BLD AUTO: 0.23 K/UL (ref 0–0.51)
EOSINOPHIL NFR BLD: 2.2 % (ref 0–6.9)
ERYTHROCYTE [DISTWIDTH] IN BLOOD BY AUTOMATED COUNT: 49.9 FL (ref 35.9–50)
GLUCOSE SERPL-MCNC: 111 MG/DL (ref 65–99)
HCT VFR BLD AUTO: 30.4 % (ref 37–47)
HGB BLD-MCNC: 10.1 G/DL (ref 12–16)
IMM GRANULOCYTES # BLD AUTO: 0.04 K/UL (ref 0–0.11)
IMM GRANULOCYTES NFR BLD AUTO: 0.4 % (ref 0–0.9)
LYMPHOCYTES # BLD AUTO: 3.06 K/UL (ref 1–4.8)
LYMPHOCYTES NFR BLD: 29.6 % (ref 22–41)
MCH RBC QN AUTO: 25.7 PG (ref 27–33)
MCHC RBC AUTO-ENTMCNC: 33.2 G/DL (ref 33.6–35)
MCV RBC AUTO: 77.4 FL (ref 81.4–97.8)
MONOCYTES # BLD AUTO: 1.03 K/UL (ref 0–0.85)
MONOCYTES NFR BLD AUTO: 10 % (ref 0–13.4)
NEUTROPHILS # BLD AUTO: 5.94 K/UL (ref 2–7.15)
NEUTROPHILS NFR BLD: 57.3 % (ref 44–72)
NRBC # BLD AUTO: 0 K/UL
NRBC BLD-RTO: 0 /100 WBC
PLATELET # BLD AUTO: 329 K/UL (ref 164–446)
PMV BLD AUTO: 9.5 FL (ref 9–12.9)
POTASSIUM SERPL-SCNC: 3.6 MMOL/L (ref 3.6–5.5)
RBC # BLD AUTO: 3.93 M/UL (ref 4.2–5.4)
SODIUM SERPL-SCNC: 137 MMOL/L (ref 135–145)
WBC # BLD AUTO: 10.4 K/UL (ref 4.8–10.8)

## 2018-12-10 PROCEDURE — 700112 HCHG RX REV CODE 229: Performed by: PHYSICIAN ASSISTANT

## 2018-12-10 PROCEDURE — 700105 HCHG RX REV CODE 258: Performed by: PHYSICIAN ASSISTANT

## 2018-12-10 PROCEDURE — A9270 NON-COVERED ITEM OR SERVICE: HCPCS | Performed by: PHYSICIAN ASSISTANT

## 2018-12-10 PROCEDURE — 99358 PROLONG SERVICE W/O CONTACT: CPT | Performed by: PHYSICAL MEDICINE & REHABILITATION

## 2018-12-10 PROCEDURE — 92610 EVALUATE SWALLOWING FUNCTION: CPT

## 2018-12-10 PROCEDURE — 700102 HCHG RX REV CODE 250 W/ 637 OVERRIDE(OP): Performed by: PHYSICIAN ASSISTANT

## 2018-12-10 PROCEDURE — 80048 BASIC METABOLIC PNL TOTAL CA: CPT

## 2018-12-10 PROCEDURE — 85025 COMPLETE CBC W/AUTO DIFF WBC: CPT

## 2018-12-10 PROCEDURE — G8996 SWALLOW CURRENT STATUS: HCPCS | Mod: CI

## 2018-12-10 PROCEDURE — G8997 SWALLOW GOAL STATUS: HCPCS | Mod: CH

## 2018-12-10 PROCEDURE — 700111 HCHG RX REV CODE 636 W/ 250 OVERRIDE (IP): Performed by: PHYSICIAN ASSISTANT

## 2018-12-10 PROCEDURE — 770001 HCHG ROOM/CARE - MED/SURG/GYN PRIV*

## 2018-12-10 RX ADMIN — SODIUM CHLORIDE: 9 INJECTION, SOLUTION INTRAVENOUS at 02:43

## 2018-12-10 RX ADMIN — GABAPENTIN 300 MG: 300 CAPSULE ORAL at 13:10

## 2018-12-10 RX ADMIN — METHOCARBAMOL 1000 MG: 500 TABLET, FILM COATED ORAL at 17:04

## 2018-12-10 RX ADMIN — ENOXAPARIN SODIUM 40 MG: 100 INJECTION SUBCUTANEOUS at 04:02

## 2018-12-10 RX ADMIN — GABAPENTIN 300 MG: 300 CAPSULE ORAL at 17:04

## 2018-12-10 RX ADMIN — CIPROFLOXACIN HYDROCHLORIDE 500 MG: 500 TABLET, FILM COATED ORAL at 04:03

## 2018-12-10 RX ADMIN — OXYCODONE HYDROCHLORIDE 5 MG: 5 TABLET ORAL at 09:10

## 2018-12-10 RX ADMIN — METHOCARBAMOL 1000 MG: 500 TABLET, FILM COATED ORAL at 09:10

## 2018-12-10 RX ADMIN — ANTACID TABLETS 500 MG: 500 TABLET, CHEWABLE ORAL at 04:02

## 2018-12-10 RX ADMIN — DOCUSATE SODIUM 100 MG: 100 CAPSULE, LIQUID FILLED ORAL at 04:21

## 2018-12-10 RX ADMIN — FUROSEMIDE 40 MG: 40 TABLET ORAL at 04:02

## 2018-12-10 RX ADMIN — METHOCARBAMOL 1000 MG: 500 TABLET, FILM COATED ORAL at 21:00

## 2018-12-10 RX ADMIN — MORPHINE SULFATE 30 MG: 30 TABLET, EXTENDED RELEASE ORAL at 05:58

## 2018-12-10 RX ADMIN — VITAMIN D, TAB 1000IU (100/BT) 5000 UNITS: 25 TAB at 04:20

## 2018-12-10 RX ADMIN — NEOMYCIN SULFATE, POLYMYXIN B SULFATE, HYDROCORTISONE 5 DROP: 3.5; 10000; 1 SOLUTION/ DROPS AURICULAR (OTIC) at 18:21

## 2018-12-10 RX ADMIN — POTASSIUM CHLORIDE 20 MEQ: 1500 TABLET, EXTENDED RELEASE ORAL at 04:02

## 2018-12-10 RX ADMIN — NEOMYCIN SULFATE, POLYMYXIN B SULFATE, HYDROCORTISONE 5 DROP: 3.5; 10000; 1 SOLUTION/ DROPS AURICULAR (OTIC) at 15:32

## 2018-12-10 RX ADMIN — METHOCARBAMOL 1000 MG: 500 TABLET, FILM COATED ORAL at 13:09

## 2018-12-10 RX ADMIN — GABAPENTIN 300 MG: 300 CAPSULE ORAL at 04:03

## 2018-12-10 RX ADMIN — MORPHINE SULFATE 30 MG: 30 TABLET, EXTENDED RELEASE ORAL at 17:04

## 2018-12-10 RX ADMIN — STANDARDIZED SENNA CONCENTRATE AND DOCUSATE SODIUM 1 TABLET: 8.6; 5 TABLET, FILM COATED ORAL at 21:00

## 2018-12-10 RX ADMIN — OXYCODONE HYDROCHLORIDE 15 MG: 5 TABLET ORAL at 05:58

## 2018-12-10 RX ADMIN — OXYCODONE HYDROCHLORIDE 15 MG: 5 TABLET ORAL at 21:07

## 2018-12-10 RX ADMIN — THERA TABS 1 TABLET: TAB at 04:21

## 2018-12-10 RX ADMIN — NEOMYCIN SULFATE, POLYMYXIN B SULFATE, HYDROCORTISONE 5 DROP: 3.5; 10000; 1 SOLUTION/ DROPS AURICULAR (OTIC) at 20:56

## 2018-12-10 RX ADMIN — OXYCODONE HYDROCHLORIDE 15 MG: 5 TABLET ORAL at 02:43

## 2018-12-10 RX ADMIN — CYANOCOBALAMIN TAB 500 MCG 1000 MCG: 500 TAB at 04:21

## 2018-12-10 RX ADMIN — SODIUM CHLORIDE: 9 INJECTION, SOLUTION INTRAVENOUS at 19:59

## 2018-12-10 RX ADMIN — DOCUSATE SODIUM 100 MG: 100 CAPSULE, LIQUID FILLED ORAL at 17:04

## 2018-12-10 RX ADMIN — BENZOCAINE AND MENTHOL 1 LOZENGE: 15; 3.6 LOZENGE ORAL at 04:06

## 2018-12-10 RX ADMIN — ANTACID TABLETS 500 MG: 500 TABLET, CHEWABLE ORAL at 17:04

## 2018-12-10 RX ADMIN — OXYCODONE HYDROCHLORIDE 15 MG: 5 TABLET ORAL at 13:09

## 2018-12-10 ASSESSMENT — PAIN SCALES - GENERAL
PAINLEVEL_OUTOF10: 6
PAINLEVEL_OUTOF10: 8
PAINLEVEL_OUTOF10: 7
PAINLEVEL_OUTOF10: 9
PAINLEVEL_OUTOF10: 7
PAINLEVEL_OUTOF10: 9
PAINLEVEL_OUTOF10: 8

## 2018-12-10 ASSESSMENT — PATIENT HEALTH QUESTIONNAIRE - PHQ9
2. FEELING DOWN, DEPRESSED, IRRITABLE, OR HOPELESS: NOT AT ALL
SUM OF ALL RESPONSES TO PHQ9 QUESTIONS 1 AND 2: 0
1. LITTLE INTEREST OR PLEASURE IN DOING THINGS: NOT AT ALL

## 2018-12-10 NOTE — PROGRESS NOTES
Pt A/Ox4. Pt reporting numbness/tingling in her BUE, worse in her right arm. Pt also presents with bilateral upper extremity weakness, pt encouraged to use her hands/arms as much as she can tolerate. She is very dependent on medical staff to feed her and hold her water cups. Pt demonstrated ability to do so and is being encouraged to be as independent as possible to regain her strength back. Pt has SCDs on, being assisted with repositioning. Skin looks good, no s/s of skin breakdown. Pt has a triple lumen central line present and all ports patent. Aspen collar on. Call light within reach.

## 2018-12-10 NOTE — DISCHARGE PLANNING
Anticipated Discharge Disposition:   IRF following    Action:   LVM for Nidia at IRF if pt can be accepted by them today as it appears that pt has not received any IV pain medication for the past 24 hrs    Barriers to Discharge:   Acceptance by IRF    Plan:   Follow up with Davin Caruso at IRF.

## 2018-12-10 NOTE — THERAPY
"Speech Language Therapy Clinical Swallow Evaluation completed.  Functional Status: Patient currently on a regular diet and reports intermittent difficulty. Patient noted to have no lower dentition and reports dentures, but they are at home. Patient reports intermittent difficulty with mastication of higher level solids such as apples and honeydew melon. Patient with no gross deficits during oral motor evaluation. Patient consumed PO trials of single ice chips, purees, pudding, soft solids, mixed consistencies, crackers, and thin liquids via straw. Patient consumed all PO trials with no overt s/sx of aspiration. Patient had grimacing with mild odynophagia endorsed, but swallow function appeared adequate and no s/sx of aspiration were noted. Laryngeal elevation palpated as weak. Initiation of swallow trigger was timely. Patient reports preference of foods cut up and softer foods, as well as pills crushed d/t sore throat. At this time, recommend patient downgrade to Dys3/thin liquid diet with feeding as needed. RN aware of evaluation. SLP to follow 1-2x for diet tolerance. Thank you for the consult.     Recommendations - Diet: Dysphagia III, Thin Liquid                          Strategies: Direct supervision during meals, Assistance needed for meal tray set-up and Head of Bed at 90 Degrees                          Medication Administration: Crush all Medications in Puree (Per pt request )  Plan of Care: Will benefit from Speech Therapy 3 times per week  Post-Acute Therapy: Recommend inpatient transitional care services for continued speech therapy services. Please consider physiatry (PM&R) consult.       See \"Rehab Therapy-Acute\" Patient Summary Report for complete documentation.   "

## 2018-12-10 NOTE — PREADMISSION SCREENING NOTE
"  Pre-Admission Screening Form    Patient Information:   Name: Karine Ovalle     MRN: [id number]       : 1965      Age: 53 y.o.   Gender: female      Race: Black or  [2]       Marital Status: Single [1]  Family Contact: Berto Ruiz,Gaudencio Carrillo,Jae Swann        Relationship: Other [10]  Son [15]  Friend [5]  Other [10]  Home Phone: [phone]           Cell Phone: [phone]    Advanced Directives: None  Code Status:  FULL  Current Attending Provider: Emre Mendoza III, M.D.  Referring Physician: Lewis Blancas PA-C      Physiatrist Consult: Dr. Dontae Lee       Referral Date: 18  Primary Payor Source:  [employer] / [employer]  Secondary Payor Source:      Medical Information:   Date of Admission to Acute Care [place]2018  Room Number: S143/00  Rehabilitation Diagnosis: 03.8 Neuromuscular Disorders  Immunization History   Administered Date(s) Administered   • Influenza Vaccine Quad Inj (Pf) 2018   • Pneumococcal polysaccharide vaccine (PPSV-23) 2018   • Tdap Vaccine 2012     No Known Allergies  Past Medical History:   Diagnosis Date   • Anemia    • Arthritis     osteo/hips and back   • At risk for falls    • Chronic back pain    • Dental disorder     lower denture   • Pain 2018    \"ALL OVER\", 10/10   • Urinary incontinence     using pads     Past Surgical History:   Procedure Laterality Date   • CERVICAL FUSION POSTERIOR  2018    Procedure: CERVICAL FUSION POSTERIOR- STAGE #2 C3-5 AND C3-T1;  Surgeon: Emre Mendoza III, M.D.;  Location: SURGERY [facility];  Service: Neurosurgery   • CERVICAL LAMINECTOMY POSTERIOR  2018    Procedure: CERVICAL LAMINECTOMY POSTERIOR C2-T1;  Surgeon: Emre Mendoza III, M.D.;  Location: SURGERY [facility];  Service: Neurosurgery   • CERVICAL DISK AND FUSION ANTERIOR  2018    Procedure: CERVICAL " "DISK AND FUSION ANTERIOR-STAGE #1 C4-5;  Surgeon: Emre Mendoza III, M.D.;  Location: SURGERY [facility];  Service: Neurosurgery   • CORPECTOMY  12/5/2018    Procedure: CORPECTOMY;  Surgeon: Emre Mendoza III, M.D.;  Location: SURGERY [facility];  Service: Neurosurgery   • HIP ARTHROPLASTY TOTAL Right 3/20/2018    Procedure: HIP ARTHROPLASTY TOTAL;  Surgeon: Bryon Levine M.D.;  Location: SURGERY [facility];  Service: Orthopedics   • KNEE ARTHROPLASTY TOTAL Right 10/20/2015    Procedure: KNEE ARTHROPLASTY TOTAL;  Surgeon: Bryon Levine M.D.;  Location: SURGERY [facility];  Service:    • APPENDECTOMY LAPAROSCOPIC  3/21/2013    Performed by Dali Queen M.D. at [facility]   • DEBRIDEMENT  5/17/2012    Performed by BRADLEY DYKES at [facility]   • PLASTIC SURGERY  2004    excess skin on arms and legs removed   • MAMMOPLASTY AUGMENTATION Bilateral 2004    breast implants and \"tummy tuck\"   • GASTRIC BYPASS LAPAROSCOPIC  2002    x 2   • ABDOMINAL EXPLORATION         History Leading to Admission, Conditions that Caused the Need for Rehab (CMS):     Emre Mendoza III, M.D. Physician Signed Surgery Neurosurgery  OP Report Date of Service: 12/5/2018 12:00 AM         []Hide copied text  []Hover for attribution information  DATE OF SERVICE:  12/05/2018     PREOPERATIVE DIAGNOSES:  1.  Cervical spondylosis.  2.  Cervical stenosis.  3.  Cervical spondylolisthesis.  4.  Cervical kyphosis  5.  Cervical myelopathy.     POSTOPERATIVE DIAGNOSES:  1.  Cervical spondylosis.  2.  Cervical stenosis.  3.  Cervical spondylolisthesis.  4.  Cervical kyphosis  5.  Cervical myelopathy.     PROCEDURES PERFORMED:  1.  Anterior C4 corpectomy, diskectomy, decompression of thecal sac and nerve   roots.  2.  Anterior C5 corpectomy, diskectomy and decompression of thecal sac and   nerve roots.  3.  C4-5 expandable titanium interbody cage placement.  4.  Open reduction of cervical " kyphosis.  5.  C3, C4, C5, C6 anterior interbody/autograft fusion.  6.  C3, C6 anterior plating fixation.  7.  Microscopic dissection.  8.  Intraoperative monitoring.     SURGEON:  Emre Mendoza MD     FIRST ASSISTANT:  Micky Hart PA-C     ANESTHESIA:  General.     COMPLICATIONS:  None.     ESTIMATED BLOOD LOSS:  50 mL.     FINDINGS:  Well decompressed thecal sac.  No change in motors and SSEPs.  Good   reduction achieved with expandable cage.  Somewhat soft bony fixation collar   until stage II, Friday.     COMPLICATIONS:  None.     DRAINS:  Left subplatysmal Hemovac.     DISPOSITION:  Extubated to recovery and to floor.     HISTORY OF PRESENT ILLNESS:  A 53-year-old woman who had cervical spondylitic   myelopathy that was progressive even to the point of being seen her as an   outpatient and now.  She had asymptomatic UTI and preoperative UA without   symptoms.  Her cervical myelopathy was progressive.  She had a kyphosis.  She   is a candidate for anterior correction with a posterior decompression and   fusion, with stage II.  The risks were explained to her as pain, infection,   bleeding, CSF leak, failure to completely resolve symptoms, neurologic   deficits including pain, weakness, numbness, bladder or bowel difficulty,   failure to fixation, failure to fusion, need for rostral caudal extension due   to junctional stenosis, injury to the trachea, carotid and esophagus,   temporary or permanent speaking or swallowing difficulties.  She understood   the risks, benefits, and agreed to the consent.        Emre Mendoza III, M.D. Physician Signed Surgery Neurosurgery  OP Report Date of Service: 12/7/2018 12:00 AM         []Hide copied text  []Hover for attribution information  DATE OF SERVICE:  12/07/2018     PREOPERATIVE DIAGNOSES:  1.  Cervical spondylosis.  2.  Cervical stenosis.  3.  Cervical myelopathy.  4.  Cervical kyphosis.  5.  Cervical spondylolisthesis.  6.  Status post stage 1, C4-C5 corpectomy  for initial decompression and deformity correction     POSTOPERATIVE DIAGNOSES:  1.  Cervical spondylosis.  2.  Cervical stenosis.  3.  Cervical myelopathy.  4.  Cervical kyphosis.  5.  Cervical spondylolisthesis.  6.  Status post stage 1, C4-C5 corpectomy for initial decompression and deformity correction     PROCEDURES PERFORMED:     Stage II:     1.  C3, C4 laminectomy decompression of thecal sac and nerve roots.  2.  C2, C3, C4, C5, C6, T1 lateral mass and pedicle screw fixation.  3.  C2, C3, C4, C5, C6, C7, T1 posterolateral allograft/autograft gloria fusion.  4.  Intraoperative monitoring.     SURGEON:  Emre Mendoza III, MD     ASSISTANT:  Berry Hart PA-C     ANESTHESIA:  General.     COMPLICATIONS:  None.     BLOOD LOSS:  50 mL     FINDINGS:  Well decompressed thecal sac.  No change in motors and SSEPs.     DRAINS:  Left subfascial MAYRA.     DISPOSITION:  Extubated to recovery then to floor.     HISTORY OF PRESENT ILLNESS:  This is a 53-year-old woman who had severe   cervical kyphotic deformity with cervical spondylolisthesis and severe   myelopathy due to critical compression.  Her stage I went well and she   improved significant function in the left greater than right side of her body.    She understood the risks and benefits with stage II, including pain,   infection, bleeding, CSF leak, failure to completely resolve symptoms,   neurologic deficits, including pain, weakness, numbness, bowel or bladder   difficulties, failure of fixation, failure of fusion, need for rostral caudal   extensions due to junctional stenosis.  I decided to take this surgery to C2   because the bone was slightly softer, I was going to put in a pars type   screws, short pedicle screws at C2 because of the CTA result, and the MRI   showed some persistent compression of C3-C4, we felt needed decompression.           Dontae Lee M.D. Physician Signed Physical Medicine & Rehab  Rehabilitation Consultative Chart Review Date  of Service: 12/9/2018  6:49 AM      Expand All Collapse All    []Hide copied text  []Phillipver for attribution information  Medical chart review completed.      Patient is a 53 y.o. year-old female with a past medical history significant for chronic cervical and lumbar back pain with chronic opioid dependence was admitted to [facility] on 12/5/2018  s/p C3-6 ACDF with C4-5 corpectomy and cage placement for management of cervical spondylosis, cervical spondylolisthesis, cervical kyphosis and cervical myelopathy.  Posterior C3-5 laminectomy with fusion C3-T1 was performed on 12/07/2018.     Her hospital course is noted for E.Coli UTI >100,000 colonies, hypertension (12/9/2018), and continued IV pain medications with outpatient baseline opioid use of 105MME/day.      ALLERGIES:  Patient has no known allergies.     PSYCHOSOCIAL HISTORY:  Living Site:  Apartment  Living With:  self  Caregiver's availability:  a friend is available for some help  Number of stairs:  1 step to enter  Substance use history:  No history of tobacco use         The patient presents  with functional deficits in mobility/self-cares, and . Pre-morbidly, this patient lived in a Unknown level home with One steps to enter,alone and able to care for self  The patient was evaluated by acute care Physical Therapy and Occupational Therapy; currently requiring moderate assistance for mobility and moderate assistance for ADLs.  The patient's current diet is Regular with Thin liquids. The patient is  a Good candidate for an acute inpatient rehabilitation program with a coordinated program of care at an intensity and frequency not available at a lower level of care. This recommendation is substantiated by the patient's current medical condition with intervention and assessment of medical issues requiring an acute level of care for patient's safety and maximum outcome.  However, prior to this she will need to be off of all IV pain medications, ideally  for 24h and her total dose of opioids reduced.   Continue treatment of the UTI, address electrolyte abnormalities and hypertension.     We will continue to follow with you in anticipation of discharge to acute inpatient rehabilitation with note of above comments, primarily concerning pain medications. Thank you for allowing us to participate in her care. Please call with any questions regarding this recommendation.     Dontae Lee MD  Physical Medicine and Rehabilitation  [facility]     Co-morbidities: See above  Potential Risk - Complications: Contractures, Deep Vein Thrombosis, Incontinence, Malnutrition, Pain, Perceptual Impairment, Pneumonia, Pressure Ulcer, Urinary Tract Infection and Infection  Level of Risk: High    Ongoing Medical Management Needed (Medical/Nursing Needs):   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 08/29/2018   • DDD (degenerative disc disease), cervical- MOD-SEVERE SPINE NV- dr brennan; needs laminectomy 12/5/2018 dr brennan 08/09/2018   • Primary osteoarthritis of left hip- dr nowak; left THR tbd feb, 2019 06/07/2018   • Obesity (BMI 30-39.9) 06/07/2018   • DDD (degenerative disc disease), lumbar 04/25/2018   • Uncomplicated opioid dependence (CMS-HCC)- kylah adkins 01/31/2018   • Essential hypertension 01/31/2018   • Venous insufficiency 10/03/2017   • Colon cancer screening- needs 08/09/2017   • Chronic bilateral low back pain with bilateral sciatica- pain mgt 07/26/2017   • Vitamin B 12 deficiency 06/02/2016   • Fibroids 08/13/2013     Bren Dukes R.N. Registered Nurse Signed   Progress Notes Date of Service: 12/9/2018  8:00 PM         []Hide copied text  []Hover for attribution information  Pt A/Ox4. Pt reporting numbness/tingling in her BUE, worse in her right arm. Pt also presents with  "bilateral upper extremity weakness, pt encouraged to use her hands/arms as much as she can tolerate. She is very dependent on medical staff to feed her and hold her water cups. Pt demonstrated ability to do so and is being encouraged to be as independent as possible to regain her strength back. Pt has SCDs on, being assisted with repositioning. Skin looks good, no s/s of skin breakdown. Pt has a triple lumen central line present and all ports patent. Aspen collar on. Call light within reach.        Current Vital Signs:   Temperature: 36.9 °C (98.4 °F) Pulse: (!) 122 Respiration: 18 Blood Pressure: (!) 98/65 (RN notified )  Weight: 85.1 kg (187 lb 9.8 oz) Height: 160 cm (5' 3\")  Pulse Oximetry: 91 % O2 (LPM): 0      Completed Laboratory Reports:  Recent Labs      12/08/18   0430  12/09/18   0230  12/10/18   0245   WBC  13.2*  10.7  10.4   HEMOGLOBIN  9.8*  10.4*  10.1*   HEMATOCRIT  29.9*  30.3*  30.4*   PLATELETCT  336  323  329   SODIUM  137  139  137   POTASSIUM  3.6  3.2*  3.6   BUN  7*  7*  8   CREATININE  0.51  0.45*  0.55   GLUCOSE  119*  89  111*     Additional Labs: Not Applicable    Prior Living Situation:   Housing / Facility: 1 Story Apartment / Condo  Steps Into Home: 3  Steps In Home: 0  Lives with - Patient's Self Care Capacity: Alone and Able to Care For Self  Equipment Owned: Wheelchair, Tub Transfer Bench    Prior Level of Function / Living Situation:   Physical Therapy: Prior Services: Home-Independent  Housing / Facility: 1 Story Apartment / Condo  Steps Into Home: 3  Steps In Home: 0  Rail: None  Bathroom Set up: Bathtub / Shower Combination, Tub Transfer Bench  Equipment Owned: Wheelchair, Tub Transfer Bench  Lives with - Patient's Self Care Capacity: Alone and Able to Care For Self  Bed Mobility: Independent  Transfer Status: Independent  Ambulation:  (very short distance due to  L hip pain)  Distance Ambulation (Feet): 25  Assistive Devices Used: Front-Wheel Walker  Wheelchair: " Independent  Stairs: Required Assist  Current Level of Function:   Level Of Assist: Maximal Assist  Assistive Device: Front Wheel Walker  Distance (Feet): 2  Deviation: Ataxic, Decreased Base Of Support, Bradykinetic, Shuffled Gait  # of Stairs Climbed: 0  Weight Bearing Status: FWB  Comments: Side steps along bed. Pt stating fatigue from getting into chair for imaging. Pt requiring step by step sequencing cues.  Supine to Sit: Maximal Assist  Sit to Supine: Maximal Assist  Scooting: Minimal Assist  Rolling: Minimal Assist to Rt., Moderate Assist to Lt.  Comments: Pt has most difficulty w/ bed mobility d/t pain. Pt w/ significant UE weakness requiring a lot of assist w/ trunk.  Sit to Stand: Moderate Assist  Bed, Chair, Wheelchair Transfer:  (Pt declining d/t being up in chair for radiology recently)  Transfer Method: Stand Pivot  Sitting in Chair: NT  Sitting Edge of Bed: 10 min  Standin min  Occupational Therapy:   Self Feeding: Independent  Grooming / Hygiene: Independent  Bathing: Independent  Dressing: Independent  Toileting: Independent  Medication Management: Independent  Laundry: Independent  Kitchen Mobility: Independent  Finances: Independent  Home Management: Independent  Shopping: Requires Assist  Prior Level Of Mobility: Uses Wheel Chair for Community Mobility, Uses Wheel Chair for in Home Mobility  Driving / Transportation: Relatives / Others Provide Transportation  Prior Services: Home-Independent  Housing / Facility: 1 Story Apartment / Condo  Current Level of Function:   Eating: Contact Guard Assist  Upper Body Dressing: Maximal Assist (aspen collar)  Lower Body Dressing: Total Assist (socks)  Toileting:  (catheter in place)  Speech Language Pathology:      Rehabilitation Prognosis/Potential: Good  Estimated Length of Stay: 10-14 days    Nursing:   Orientation : Oriented x 4  Continent    Scope/Intensity of Services Recommended:  Physical Therapy: 1.5 hr / day  5 days / week. Therapeutic  Interventions Required: Maximize Endurance, Mobility, Strength and Safety  Occupational Therapy: 1.5 hr / day 5 days / week. Therapeutic Interventions Required: Maximize Self Care, ADLs, IADLs and Energy Conservation  Rehabilitation Nursin/7. Therapeutic Interventions Required: Monitor Pain, Skin, Vital Signs, Intake and Output, Labs, Safety, Aspiration Risk, Family Training and Surgical incision care; Bowel & Bladder regimen; DVT prophylaxis; ADL's.   Rehabilitation Physician: 3 - 5 days / week. Therapeutic Interventions Required: Medical Management  Dietician: Consult. Therapeutic Interventions Required: Nutritional evaluation with recommendations to promote optimal health/healing.     Rehabilitation Goals and Plan (Expected frequency & duration of treatment in the IRF):   Return to the Community, Modified Independent Level of Care and Outpatient Support  Anticipated Date of Rehabilitation Admission: 12/10/18  Patient/Family oriented IRF level of care/facility/plan: Yes  Patient/Family willing to participate in IRF care/facility/plan: Yes  Patient able to tolerate IRF level of care proposed: Yes  Patient has potential to benefit IRF level of care proposed: Yes  Comments: Not Applicable    Special Needs or Precautions - Medical Necessity:  Safety Concerns/Precautions:  Fall Risk / High Risk for Falls and Balance  Complex Wound Care: C-spine surgical incision care.  Pain Management  Splints/Braces/Orthotics: Aspen Collar  C-Spine precautions.     Diet:   DIET ORDERS (Through next 24h)    Start     Ordered    18  Diet Order Regular  ALL MEALS     Question:  Diet:  Answer:  Regular    18          Anticipated Discharge Destination / Patient/Family Goal:  Destination: Home Alone Support System: Friends and Significant Other  Anticipated home health services: OT, PT and Nursing  Previously used HH service/ provider: Not Applicable  Anticipated DME Needs: To be determined  Outpatient Services:  To be determined  Alternative resources to address additional identified needs:   N/A  Pre-Screen Completed: 12/10/2018 10:37 AM Meghan Caruso R.N.

## 2018-12-10 NOTE — CARE PLAN
Problem: Pain Management  Goal: Pain level will decrease to patient's comfort goal  Outcome: PROGRESSING SLOWER THAN EXPECTED  Pt reports a constant 9/10 - 10/10 pain. Pt has pain in RUE and LUE as well as tingling in her RUE that gives here a lot of discomfort. Despite change in medications, pt does not report improvement in pain management.    Problem: Mobility  Goal: Risk for activity intolerance will decrease  Outcome: PROGRESSING SLOWER THAN EXPECTED  Pt is unable to mobilize out of the bed and struggles to be independent in ADLs, such as feeding self/holding a glass to drink. Pt has been encouraged to be as independent as she can tolerate in order to rebuild her strength.

## 2018-12-10 NOTE — DISCHARGE PLANNING
Insurance has authorized inpatient rehab. Will follow for medical clearance/pain management on oral regimen in anticipation of transition to [facility]. VM update to VERO Gaxiola.

## 2018-12-10 NOTE — THERAPY
"Physical Therapy Treatment completed.   Bed Mobility:  Supine to Sit: Maximal Assist  Transfers: Sit to Stand: Moderate Assist  Gait: Level Of Assist: Maximal Assist with Front-Wheel Walker  (Mainly for sequencing cues)     Plan of Care: Will benefit from Physical Therapy 5 times per week  Discharge Recommendations: Equipment: Will Continue to Assess for Equipment Needs.     See \"Rehab Therapy-Acute\" Patient Summary Report for complete documentation.     Pt continues to present w/ decreased functional mobility. Pt was limited by pain in her C-spine and UE's. Even w/ pain, pt motivated to participate. Pt also demonstrating significant weakness in B UE's. Pt needing more assist w/ mobility d/t weakness. Pt did demonstrate improved core activation to compensate for UE weakness during bed mobility. Pt was seen in March for a R VILMA and states that she needs a L VILMA so she has difficulty WB. All of these factors limit pts ability to perform mobility w/out assistance. Currently pt will require post acute therapy to increase independence, balance, and strength w/ mobility.  "

## 2018-12-10 NOTE — DISCHARGE PLANNING
Call to [facility] insurance liaison requesting consideration for IRF level care. Will follow for auth determination. JB reddy.

## 2018-12-10 NOTE — PROGRESS NOTES
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  POD#3 C3-5 lami with C3-T1 fusion.  Pain not well controlled and requiring IV morphine.  Rehab accepting  But cannot take her until off IV pain meds x 24 hours  Arm pain and burning.Neuropathy still present but moving arms better.   Tachycardic, Afebrile.  Gave fluid challenge.  No radicular pain.  Voice fine, Breathing well,  swallowing uncomfortable.    Exam:  Wound C/D/I   Arms diffusely weak, but equal bilaterally.  Sensory improved  Swallowing well with mild soreness.  Breathing and speaking well.      BP  Min: 103/70  Max: 165/91  Pulse  Av  Min: 97  Max: 145  Resp  Av  Min: 16  Max: 18  Temp  Av.9 °C (98.5 °F)  Min: 36.5 °C (97.7 °F)  Max: 37.6 °C (99.6 °F)  SpO2  Av.5 %  Min: 91 %  Max: 96 %    No Data Recorded    Recent Labs      18   0430  18   0230  12/10/18   0245   WBC  13.2*  10.7  10.4   RBC  3.81*  3.93*  3.93*   HEMOGLOBIN  9.8*  10.4*  10.1*   HEMATOCRIT  29.9*  30.3*  30.4*   MCV  78.5*  77.1*  77.4*   MCH  25.7*  26.5*  25.7*   MCHC  32.8*  34.3  33.2*   RDW  50.8*  49.2  49.9   PLATELETCT  336  323  329   MPV  9.8  9.8  9.5     Recent Labs      18   0430  18   0230  12/10/18   0245   SODIUM  137  139  137   POTASSIUM  3.6  3.2*  3.6   CHLORIDE  103  103  101   CO2  29  28  30   GLUCOSE  119*  89  111*   BUN  7*  7*  8   CREATININE  0.51  0.45*  0.55   CALCIUM  8.6  8.7  8.7               Intake/Output       18 - 12/10/18 0659 12/10/18 0700 - 18 Total  Total       Intake    P.O.  --  480 480  --  -- --    P.O. -- 480 480 -- -- --    I.V.  --  400 400  --  -- --    Volume (mL) (NS infusion) -- 400 400 -- -- --    Total Intake -- 880 880 -- -- --       Output    Urine  --  -- --  --  -- --    Number of Times Voided -- 1 x 1 x -- -- --    Drains  15  10 25  --  -- --    Output (mL) ([REMOVED]  Closed/Suction Drain 1 Posterior Neck Nash Lopes) 15 10 25 -- -- --    Stool  --  -- --  --  -- --    Number of Times Stooled 0 x -- 0 x -- -- --    Total Output 15 10 25 -- -- --       Net I/O     -15 870 855 -- -- --            Intake/Output Summary (Last 24 hours) at 12/10/18 0848  Last data filed at 12/10/18 0600   Gross per 24 hour   Intake              880 ml   Output               25 ml   Net              855 ml            • methocarbamol  1,000 mg 4X/DAY   • gabapentin  300 mg TID   • oxyCODONE immediate-release  15 mg Q3HRS PRN   • NS   Continuous   • morphine injection  4 mg Q3HRS PRN   • diphenhydrAMINE  25 mg Q6HRS PRN   • enoxaparin  40 mg DAILY   • ALPRAZolam  0.5 mg TID PRN   • neomycin/colistin/thonz/HC  5 Drop 4X/DAY   • cyanocobalamin  1,000 mcg DAILY   • furosemide  40 mg DAILY   • morphine ER  30 mg Q12HRS   • multivitamin  1 Tab DAILY   • potassium chloride SA  20 mEq DAILY   • Pharmacy Consult Request  1 Each PRN   • MD ALERT...DO NOT ADMINISTER NSAIDS or ASPIRIN unless ORDERED By Neurosurgery  1 Each PRN   • docusate sodium  100 mg BID   • senna-docusate  1 Tab Nightly   • senna-docusate  1 Tab Q24HRS PRN   • polyethylene glycol/lytes  1 Packet BID PRN   • magnesium hydroxide  30 mL QDAY PRN   • bisacodyl  10 mg Q24HRS PRN   • fleet  1 Each Once PRN   • NS   Continuous   • oxyCODONE immediate-release  5 mg Q3HRS PRN   • HYDROmorphone  0.5 mg Q3HRS PRN   • ondansetron  4 mg Q4HRS PRN   • ondansetron  4 mg Q4HRS PRN   • promethazine  12.5-25 mg Q4HRS PRN   • promethazine  12.5-25 mg Q4HRS PRN   • prochlorperazine  5-10 mg Q4HRS PRN   • benzocaine-menthol  1 Lozenge Q2HRS PRN   • calcium carbonate  500 mg BID   • vitamin D  5,000 Units DAILY   • hydrALAZINE  10 mg Q HOUR PRN       Assessment and Plan:  POD #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  PT/OT/ambulate as tolerated.  Iv bolus for tachycardia.  Stop all IV pain meds, will adjust muscle  relaxant and oral pain meds.  Likely ok for transfer to rehab tomorrow when pain better controlled on oral meds.   Swallow study today     Prophylactic anticoagulation: yes       Start date/time: saturday

## 2018-12-10 NOTE — DISCHARGE PLANNING
Anticipated Discharge Disposition:   [facility] following    Action:   Talked to Meghan Carsuo at [facility]     Barriers to Discharge:   Waiting for insurance auth    Plan:   Follow up with [facility].

## 2018-12-11 ENCOUNTER — HOSPITAL ENCOUNTER (INPATIENT)
Facility: REHABILITATION | Age: 53
LOS: 15 days | DRG: 052 | End: 2018-12-26
Attending: PHYSICAL MEDICINE & REHABILITATION | Admitting: PHYSICAL MEDICINE & REHABILITATION
Payer: COMMERCIAL

## 2018-12-11 VITALS
DIASTOLIC BLOOD PRESSURE: 59 MMHG | HEART RATE: 97 BPM | RESPIRATION RATE: 16 BRPM | BODY MASS INDEX: 33.24 KG/M2 | HEIGHT: 63 IN | WEIGHT: 187.61 LBS | SYSTOLIC BLOOD PRESSURE: 119 MMHG | TEMPERATURE: 98.5 F | OXYGEN SATURATION: 100 %

## 2018-12-11 DIAGNOSIS — G95.9 CERVICAL MYELOPATHY (HCC): ICD-10-CM

## 2018-12-11 DIAGNOSIS — M25.552 LEFT HIP PAIN: ICD-10-CM

## 2018-12-11 PROCEDURE — 700112 HCHG RX REV CODE 229: Performed by: PHYSICAL MEDICINE & REHABILITATION

## 2018-12-11 PROCEDURE — A9270 NON-COVERED ITEM OR SERVICE: HCPCS | Performed by: PHYSICAL MEDICINE & REHABILITATION

## 2018-12-11 PROCEDURE — 700102 HCHG RX REV CODE 250 W/ 637 OVERRIDE(OP): Performed by: PHYSICIAN ASSISTANT

## 2018-12-11 PROCEDURE — 700102 HCHG RX REV CODE 250 W/ 637 OVERRIDE(OP): Performed by: PHYSICAL MEDICINE & REHABILITATION

## 2018-12-11 PROCEDURE — A9270 NON-COVERED ITEM OR SERVICE: HCPCS | Performed by: PHYSICIAN ASSISTANT

## 2018-12-11 PROCEDURE — 700105 HCHG RX REV CODE 258: Performed by: PHYSICIAN ASSISTANT

## 2018-12-11 PROCEDURE — 770010 HCHG ROOM/CARE - REHAB SEMI PRIVAT*

## 2018-12-11 PROCEDURE — 700101 HCHG RX REV CODE 250: Performed by: PHYSICAL MEDICINE & REHABILITATION

## 2018-12-11 PROCEDURE — 700112 HCHG RX REV CODE 229: Performed by: PHYSICIAN ASSISTANT

## 2018-12-11 PROCEDURE — 700111 HCHG RX REV CODE 636 W/ 250 OVERRIDE (IP): Performed by: PHYSICIAN ASSISTANT

## 2018-12-11 PROCEDURE — 94760 N-INVAS EAR/PLS OXIMETRY 1: CPT

## 2018-12-11 PROCEDURE — 99223 1ST HOSP IP/OBS HIGH 75: CPT | Performed by: PHYSICAL MEDICINE & REHABILITATION

## 2018-12-11 RX ORDER — OXYCODONE HYDROCHLORIDE 10 MG/1
10 TABLET ORAL
Status: DISCONTINUED | OUTPATIENT
Start: 2018-12-11 | End: 2018-12-24

## 2018-12-11 RX ORDER — FUROSEMIDE 40 MG/1
40 TABLET ORAL DAILY
Status: CANCELLED | OUTPATIENT
Start: 2018-12-12

## 2018-12-11 RX ORDER — DOCUSATE SODIUM 100 MG/1
100 CAPSULE, LIQUID FILLED ORAL 2 TIMES DAILY
Status: CANCELLED | OUTPATIENT
Start: 2018-12-11

## 2018-12-11 RX ORDER — CALCIUM CARBONATE 500 MG/1
500 TABLET, CHEWABLE ORAL 2 TIMES DAILY
Status: CANCELLED | OUTPATIENT
Start: 2018-12-11

## 2018-12-11 RX ORDER — PROMETHAZINE HYDROCHLORIDE 25 MG/1
12.5-25 TABLET ORAL EVERY 4 HOURS PRN
Status: CANCELLED | OUTPATIENT
Start: 2018-12-11

## 2018-12-11 RX ORDER — ONDANSETRON 4 MG/1
4 TABLET, ORALLY DISINTEGRATING ORAL 4 TIMES DAILY PRN
Status: DISCONTINUED | OUTPATIENT
Start: 2018-12-11 | End: 2018-12-26 | Stop reason: HOSPADM

## 2018-12-11 RX ORDER — DIPHENHYDRAMINE HCL 25 MG
25 TABLET ORAL EVERY 6 HOURS PRN
Qty: 30 TAB | Refills: 0 | Status: ON HOLD | OUTPATIENT
Start: 2018-12-11 | End: 2018-12-26

## 2018-12-11 RX ORDER — POLYVINYL ALCOHOL 14 MG/ML
1 SOLUTION/ DROPS OPHTHALMIC PRN
Status: DISCONTINUED | OUTPATIENT
Start: 2018-12-11 | End: 2018-12-26 | Stop reason: HOSPADM

## 2018-12-11 RX ORDER — GABAPENTIN 300 MG/1
300 CAPSULE ORAL 3 TIMES DAILY
Status: CANCELLED | OUTPATIENT
Start: 2018-12-11

## 2018-12-11 RX ORDER — METHOCARBAMOL 500 MG/1
1000 TABLET, FILM COATED ORAL 4 TIMES DAILY
Qty: 120 TAB | Refills: 0 | Status: ON HOLD | OUTPATIENT
Start: 2018-12-11 | End: 2018-12-26

## 2018-12-11 RX ORDER — HYDRALAZINE HYDROCHLORIDE 25 MG/1
25 TABLET, FILM COATED ORAL EVERY 8 HOURS PRN
Status: DISCONTINUED | OUTPATIENT
Start: 2018-12-11 | End: 2018-12-26 | Stop reason: HOSPADM

## 2018-12-11 RX ORDER — OXYCODONE HYDROCHLORIDE 15 MG/1
15 TABLET ORAL
Qty: 112 TAB | Refills: 0 | Status: ON HOLD
Start: 2018-12-11 | End: 2018-12-26

## 2018-12-11 RX ORDER — OXYCODONE HYDROCHLORIDE 5 MG/1
5 TABLET ORAL
Status: DISCONTINUED | OUTPATIENT
Start: 2018-12-11 | End: 2018-12-24

## 2018-12-11 RX ORDER — ONDANSETRON 4 MG/1
4 TABLET, ORALLY DISINTEGRATING ORAL EVERY 4 HOURS PRN
Qty: 10 TAB | Refills: 0 | Status: ON HOLD
Start: 2018-12-11 | End: 2018-12-26

## 2018-12-11 RX ORDER — METHOCARBAMOL 500 MG/1
1000 TABLET, FILM COATED ORAL 4 TIMES DAILY
Status: CANCELLED | OUTPATIENT
Start: 2018-12-11

## 2018-12-11 RX ORDER — TRAZODONE HYDROCHLORIDE 50 MG/1
50 TABLET ORAL
Status: DISCONTINUED | OUTPATIENT
Start: 2018-12-11 | End: 2018-12-26 | Stop reason: HOSPADM

## 2018-12-11 RX ORDER — AMOXICILLIN 250 MG
1 CAPSULE ORAL
Qty: 30 TAB | Refills: 0 | Status: ON HOLD | OUTPATIENT
Start: 2018-12-11 | End: 2018-12-26

## 2018-12-11 RX ORDER — METHOCARBAMOL 500 MG/1
1000 TABLET, FILM COATED ORAL 4 TIMES DAILY
Status: DISCONTINUED | OUTPATIENT
Start: 2018-12-11 | End: 2018-12-17

## 2018-12-11 RX ORDER — PSEUDOEPHEDRINE HCL 30 MG
100 TABLET ORAL 2 TIMES DAILY
Qty: 60 CAP | Status: ON HOLD
Start: 2018-12-11 | End: 2019-02-28

## 2018-12-11 RX ORDER — GABAPENTIN 300 MG/1
300 CAPSULE ORAL 3 TIMES DAILY
Qty: 90 CAP | Refills: 0 | Status: SHIPPED | OUTPATIENT
Start: 2018-12-11 | End: 2019-01-03 | Stop reason: SDUPTHER

## 2018-12-11 RX ORDER — AMOXICILLIN 250 MG
1 CAPSULE ORAL 2 TIMES DAILY
Status: DISCONTINUED | OUTPATIENT
Start: 2018-12-11 | End: 2018-12-26 | Stop reason: HOSPADM

## 2018-12-11 RX ORDER — AMOXICILLIN 250 MG
1 CAPSULE ORAL 2 TIMES DAILY
Qty: 30 TAB | Refills: 0 | Status: ON HOLD
Start: 2018-12-11 | End: 2018-12-26

## 2018-12-11 RX ORDER — POTASSIUM CHLORIDE 20 MEQ/1
20 TABLET, EXTENDED RELEASE ORAL DAILY
Status: CANCELLED | OUTPATIENT
Start: 2018-12-12

## 2018-12-11 RX ORDER — ACETAMINOPHEN 500 MG
1000 TABLET ORAL EVERY 6 HOURS
Status: DISPENSED | OUTPATIENT
Start: 2018-12-11 | End: 2018-12-21

## 2018-12-11 RX ORDER — DOCUSATE SODIUM 100 MG/1
100 CAPSULE, LIQUID FILLED ORAL 2 TIMES DAILY
Status: DISCONTINUED | OUTPATIENT
Start: 2018-12-11 | End: 2018-12-26 | Stop reason: HOSPADM

## 2018-12-11 RX ORDER — SODIUM CHLORIDE 9 MG/ML
1 INJECTION, SOLUTION INTRAVENOUS CONTINUOUS
Qty: 1 ML | Refills: 0 | Status: ON HOLD
Start: 2018-12-11 | End: 2018-12-26

## 2018-12-11 RX ORDER — OXYCODONE HYDROCHLORIDE 5 MG/1
5 TABLET ORAL
Qty: 30 TAB | Refills: 0 | Status: ON HOLD
Start: 2018-12-11 | End: 2018-12-26

## 2018-12-11 RX ORDER — ONDANSETRON 2 MG/ML
4 INJECTION INTRAMUSCULAR; INTRAVENOUS 4 TIMES DAILY PRN
Status: DISCONTINUED | OUTPATIENT
Start: 2018-12-11 | End: 2018-12-26 | Stop reason: HOSPADM

## 2018-12-11 RX ORDER — ALPRAZOLAM 0.5 MG/1
0.5 TABLET ORAL 3 TIMES DAILY PRN
Qty: 30 TAB | Refills: 0 | Status: ON HOLD
Start: 2018-12-11 | End: 2018-12-26

## 2018-12-11 RX ORDER — MORPHINE SULFATE 30 MG/1
30 TABLET, FILM COATED, EXTENDED RELEASE ORAL EVERY 12 HOURS
Status: DISCONTINUED | OUTPATIENT
Start: 2018-12-11 | End: 2018-12-15

## 2018-12-11 RX ORDER — MORPHINE SULFATE 30 MG/1
30 TABLET, FILM COATED, EXTENDED RELEASE ORAL EVERY 12 HOURS
Status: CANCELLED | OUTPATIENT
Start: 2018-12-11

## 2018-12-11 RX ORDER — POTASSIUM CHLORIDE 20 MEQ/1
20 TABLET, EXTENDED RELEASE ORAL DAILY
Status: DISCONTINUED | OUTPATIENT
Start: 2018-12-12 | End: 2018-12-26 | Stop reason: HOSPADM

## 2018-12-11 RX ORDER — FUROSEMIDE 40 MG/1
40 TABLET ORAL DAILY
Status: DISCONTINUED | OUTPATIENT
Start: 2018-12-12 | End: 2018-12-26 | Stop reason: HOSPADM

## 2018-12-11 RX ORDER — PROMETHAZINE HYDROCHLORIDE 25 MG/1
12.5-25 TABLET ORAL EVERY 4 HOURS PRN
Status: DISCONTINUED | OUTPATIENT
Start: 2018-12-11 | End: 2018-12-26 | Stop reason: HOSPADM

## 2018-12-11 RX ORDER — CHOLECALCIFEROL (VITAMIN D3) 125 MCG
1000 CAPSULE ORAL DAILY
Status: CANCELLED | OUTPATIENT
Start: 2018-12-12

## 2018-12-11 RX ORDER — ALPRAZOLAM 0.5 MG/1
0.5 TABLET ORAL 3 TIMES DAILY PRN
Status: CANCELLED | OUTPATIENT
Start: 2018-12-11

## 2018-12-11 RX ORDER — ALUMINA, MAGNESIA, AND SIMETHICONE 2400; 2400; 240 MG/30ML; MG/30ML; MG/30ML
20 SUSPENSION ORAL
Status: DISCONTINUED | OUTPATIENT
Start: 2018-12-11 | End: 2018-12-26 | Stop reason: HOSPADM

## 2018-12-11 RX ORDER — POLYETHYLENE GLYCOL 3350 17 G/17G
17 POWDER, FOR SOLUTION ORAL 2 TIMES DAILY PRN
Qty: 1 EACH | Refills: 3 | Status: ON HOLD | OUTPATIENT
Start: 2018-12-11 | End: 2018-12-26

## 2018-12-11 RX ORDER — OMEPRAZOLE 20 MG/1
20 CAPSULE, DELAYED RELEASE ORAL DAILY
Status: DISCONTINUED | OUTPATIENT
Start: 2018-12-12 | End: 2018-12-26 | Stop reason: HOSPADM

## 2018-12-11 RX ORDER — BISACODYL 10 MG
10 SUPPOSITORY, RECTAL RECTAL
Qty: 1 SUPPOSITORY | Refills: 0 | Status: ON HOLD
Start: 2018-12-11 | End: 2018-12-26

## 2018-12-11 RX ORDER — ALPRAZOLAM 0.5 MG/1
0.5 TABLET ORAL 3 TIMES DAILY PRN
Status: DISCONTINUED | OUTPATIENT
Start: 2018-12-11 | End: 2018-12-26 | Stop reason: HOSPADM

## 2018-12-11 RX ORDER — CALCIUM CARBONATE 500 MG/1
500 TABLET, CHEWABLE ORAL 2 TIMES DAILY
Status: DISCONTINUED | OUTPATIENT
Start: 2018-12-11 | End: 2018-12-26 | Stop reason: HOSPADM

## 2018-12-11 RX ORDER — HYDRALAZINE HYDROCHLORIDE 20 MG/ML
10 INJECTION INTRAMUSCULAR; INTRAVENOUS
Qty: 1 ML | Refills: 0 | Status: ON HOLD
Start: 2018-12-11 | End: 2018-12-26

## 2018-12-11 RX ORDER — ENEMA 19; 7 G/133ML; G/133ML
1 ENEMA RECTAL
Qty: 1 EACH | Refills: 0 | Status: ON HOLD
Start: 2018-12-11 | End: 2018-12-26

## 2018-12-11 RX ORDER — GABAPENTIN 300 MG/1
300 CAPSULE ORAL 3 TIMES DAILY
Status: DISCONTINUED | OUTPATIENT
Start: 2018-12-11 | End: 2018-12-26 | Stop reason: HOSPADM

## 2018-12-11 RX ORDER — ECHINACEA PURPUREA EXTRACT 125 MG
2 TABLET ORAL PRN
Status: DISCONTINUED | OUTPATIENT
Start: 2018-12-11 | End: 2018-12-26 | Stop reason: HOSPADM

## 2018-12-11 RX ORDER — PROMETHAZINE HYDROCHLORIDE 12.5 MG/1
12.5-25 SUPPOSITORY RECTAL EVERY 4 HOURS PRN
Qty: 10 SUPPOSITORY | Refills: 0 | Status: ON HOLD
Start: 2018-12-11 | End: 2018-12-26

## 2018-12-11 RX ORDER — PROMETHAZINE HYDROCHLORIDE 12.5 MG/1
12.5-25 TABLET ORAL EVERY 4 HOURS PRN
Qty: 30 TAB | Refills: 0 | Status: ON HOLD | OUTPATIENT
Start: 2018-12-11 | End: 2018-12-26

## 2018-12-11 RX ORDER — SODIUM CHLORIDE 9 MG/ML
1 INJECTION, SOLUTION INTRAVENOUS CONTINUOUS
Refills: 0 | Status: ON HOLD
Start: 2018-12-11 | End: 2018-12-26

## 2018-12-11 RX ORDER — ONDANSETRON 2 MG/ML
4 INJECTION INTRAMUSCULAR; INTRAVENOUS EVERY 4 HOURS PRN
Qty: 84 ML | Refills: 0 | Status: ON HOLD
Start: 2018-12-11 | End: 2018-12-26

## 2018-12-11 RX ORDER — CALCIUM CARBONATE 500 MG/1
500 TABLET, CHEWABLE ORAL 2 TIMES DAILY
Qty: 30 TAB | Refills: 0 | Status: ON HOLD | OUTPATIENT
Start: 2018-12-11 | End: 2019-02-28

## 2018-12-11 RX ADMIN — DOCUSATE SODIUM 100 MG: 100 CAPSULE, LIQUID FILLED ORAL at 06:29

## 2018-12-11 RX ADMIN — NEOMYCIN SULFATE, POLYMYXIN B SULFATE, HYDROCORTISONE 5 DROP: 3.5; 10000; 1 SOLUTION/ DROPS AURICULAR (OTIC) at 08:26

## 2018-12-11 RX ADMIN — METHOCARBAMOL 1000 MG: 500 TABLET ORAL at 17:33

## 2018-12-11 RX ADMIN — OXYCODONE HYDROCHLORIDE 15 MG: 5 TABLET ORAL at 04:49

## 2018-12-11 RX ADMIN — ENOXAPARIN SODIUM 40 MG: 100 INJECTION SUBCUTANEOUS at 06:53

## 2018-12-11 RX ADMIN — OXYCODONE HYDROCHLORIDE 10 MG: 10 TABLET ORAL at 17:33

## 2018-12-11 RX ADMIN — GABAPENTIN 300 MG: 300 CAPSULE ORAL at 06:34

## 2018-12-11 RX ADMIN — SODIUM CHLORIDE: 9 INJECTION, SOLUTION INTRAVENOUS at 06:56

## 2018-12-11 RX ADMIN — ACETAMINOPHEN 1000 MG: 500 TABLET, FILM COATED ORAL at 23:06

## 2018-12-11 RX ADMIN — MORPHINE SULFATE 30 MG: 30 TABLET, EXTENDED RELEASE ORAL at 06:36

## 2018-12-11 RX ADMIN — DOCUSATE SODIUM 100 MG: 100 CAPSULE, LIQUID FILLED ORAL at 20:40

## 2018-12-11 RX ADMIN — BENZOCAINE, MENTHOL 1 LOZENGE: 15; 3.6 LOZENGE ORAL at 20:40

## 2018-12-11 RX ADMIN — BENZOCAINE, MENTHOL 1 LOZENGE: 15; 3.6 LOZENGE ORAL at 12:47

## 2018-12-11 RX ADMIN — METHOCARBAMOL 1000 MG: 500 TABLET ORAL at 20:40

## 2018-12-11 RX ADMIN — THERA TABS 1 TABLET: TAB at 06:32

## 2018-12-11 RX ADMIN — MORPHINE SULFATE 30 MG: 30 TABLET, EXTENDED RELEASE ORAL at 20:41

## 2018-12-11 RX ADMIN — OXYCODONE HYDROCHLORIDE 15 MG: 5 TABLET ORAL at 08:26

## 2018-12-11 RX ADMIN — BENZOCAINE AND MENTHOL 1 LOZENGE: 15; 3.6 LOZENGE ORAL at 06:51

## 2018-12-11 RX ADMIN — Medication 60 ML: at 20:41

## 2018-12-11 RX ADMIN — METHOCARBAMOL 1000 MG: 500 TABLET ORAL at 12:48

## 2018-12-11 RX ADMIN — ANTACID TABLETS 500 MG: 500 TABLET, CHEWABLE ORAL at 06:39

## 2018-12-11 RX ADMIN — ACETAMINOPHEN 1000 MG: 500 TABLET, FILM COATED ORAL at 17:33

## 2018-12-11 RX ADMIN — FUROSEMIDE 40 MG: 40 TABLET ORAL at 06:38

## 2018-12-11 RX ADMIN — ACETAMINOPHEN 1000 MG: 500 TABLET, FILM COATED ORAL at 12:48

## 2018-12-11 RX ADMIN — CALCIUM CARBONATE (ANTACID) CHEW TAB 500 MG 500 MG: 500 CHEW TAB at 12:48

## 2018-12-11 RX ADMIN — OXYCODONE HYDROCHLORIDE 15 MG: 5 TABLET ORAL at 00:58

## 2018-12-11 RX ADMIN — CYANOCOBALAMIN TAB 500 MCG 1000 MCG: 500 TAB at 06:28

## 2018-12-11 RX ADMIN — GABAPENTIN 300 MG: 300 CAPSULE ORAL at 15:06

## 2018-12-11 RX ADMIN — SENNOSIDES AND DOCUSATE SODIUM 1 TABLET: 8.6; 5 TABLET ORAL at 20:40

## 2018-12-11 RX ADMIN — OXYCODONE HYDROCHLORIDE 10 MG: 10 TABLET ORAL at 12:48

## 2018-12-11 RX ADMIN — POTASSIUM CHLORIDE 20 MEQ: 1500 TABLET, EXTENDED RELEASE ORAL at 06:41

## 2018-12-11 RX ADMIN — METHOCARBAMOL 1000 MG: 500 TABLET, FILM COATED ORAL at 08:26

## 2018-12-11 RX ADMIN — VITAMIN D, TAB 1000IU (100/BT) 5000 UNITS: 25 TAB at 06:47

## 2018-12-11 RX ADMIN — NEOMYCIN SULFATE, POLYMYXIN B SULFATE, HYDROCORTISONE 4 DROP: 3.5; 10000; 1 SOLUTION/ DROPS AURICULAR (OTIC) at 20:41

## 2018-12-11 RX ADMIN — GABAPENTIN 300 MG: 300 CAPSULE ORAL at 20:41

## 2018-12-11 RX ADMIN — CALCIUM CARBONATE (ANTACID) CHEW TAB 500 MG 500 MG: 500 CHEW TAB at 20:40

## 2018-12-11 ASSESSMENT — LIFESTYLE VARIABLES
ALCOHOL_USE: YES
HAVE YOU EVER FELT YOU SHOULD CUT DOWN ON YOUR DRINKING: NO
EVER_SMOKED: NEVER
TOTAL SCORE: 0
ON A TYPICAL DAY WHEN YOU DRINK ALCOHOL HOW MANY DRINKS DO YOU HAVE: 1
TOTAL SCORE: 0
AVERAGE NUMBER OF DAYS PER WEEK YOU HAVE A DRINK CONTAINING ALCOHOL: 1
CONSUMPTION TOTAL: NEGATIVE
EVER FELT BAD OR GUILTY ABOUT YOUR DRINKING: NO
TOTAL SCORE: 0
HAVE PEOPLE ANNOYED YOU BY CRITICIZING YOUR DRINKING: NO
HOW MANY TIMES IN THE PAST YEAR HAVE YOU HAD 5 OR MORE DRINKS IN A DAY: 0
EVER HAD A DRINK FIRST THING IN THE MORNING TO STEADY YOUR NERVES TO GET RID OF A HANGOVER: NO

## 2018-12-11 ASSESSMENT — PAIN SCALES - GENERAL
PAINLEVEL_OUTOF10: 10
PAINLEVEL_OUTOF10: 5
PAINLEVEL_OUTOF10: 10
PAINLEVEL_OUTOF10: 10
PAINLEVEL_OUTOF10: 9
PAINLEVEL_OUTOF10: 10
PAINLEVEL_OUTOF10: 9
PAINLEVEL_OUTOF10: 8

## 2018-12-11 ASSESSMENT — PATIENT HEALTH QUESTIONNAIRE - PHQ9
2. FEELING DOWN, DEPRESSED, IRRITABLE, OR HOPELESS: NOT AT ALL
1. LITTLE INTEREST OR PLEASURE IN DOING THINGS: NOT AT ALL
SUM OF ALL RESPONSES TO PHQ9 QUESTIONS 1 AND 2: 0
SUM OF ALL RESPONSES TO PHQ9 QUESTIONS 1 AND 2: 0
2. FEELING DOWN, DEPRESSED, IRRITABLE, OR HOPELESS: NOT AT ALL
1. LITTLE INTEREST OR PLEASURE IN DOING THINGS: NOT AT ALL

## 2018-12-11 NOTE — PROGRESS NOTES
Patient admitted to facility at 1112 via wheelchair; accompanied by hospital transport.  Patient assisted to room and positioned in w/c at bedside, will sit up for lunch at bedside for comfort and safety; call light within reach.  Patient assisted with stowing belongings and oriented to room and facility.  Admission assessment performed and documented in computer.  Admission paperwork completed; signed copies placed in chart.  Will continue to monitor with hourly and PRN rounding.

## 2018-12-11 NOTE — PROGRESS NOTES
RN gave report to rehab nurse VERO Iglesias and informed Kelsie of the newly discovered skin alteration found on her right buttock while transporting. (VERO Montes verified the skin alteration). Patient verbalized understanding of the next step of care and the discharge education. Receiving RN is aware that the triple lumen internal jugular central line will remain--receiving RN mentioned that they manage central lines in the rehab facility.

## 2018-12-11 NOTE — DISCHARGE PLANNING
note:  Called to notify PA that pt is being discharge to [facility] at 1030   CM was on hold for 10 minutes and PA did not answer call.   Sent text message to PA about transfer.     Pt signed the COBRA    Addendum:  Michi Choi aware of transfer to [facility] via tiger text.

## 2018-12-11 NOTE — H&P
REHABILITATION HISTORY AND PHYSICAL/POST ADMISSION PHYSICAL EVALUATION    Date of Admission: 12/11/2018  3:48 PM  Karine Ovalle  [id number]/02    [facility] Code / Diagnosis to Support: 04.1211 Quadriplegia, Incomplete C1-4 * Different than PAS  Reason for admission: Cervical myelopathy (HCC)    HPI:  The patient is a 53 y.o. female with a past medical history of HTN, Edema, Hypokalemia, Anemia, and chronic pain including bilateral hips, knees and back who was admitted on 12/5/18 for scheduled cervical intervention. Per patient she was having worsening symptoms into her bilateral arms prior to this procedure with mild weakness in her arms.  She underwent a two stage procedure with Dr Mendoza for her severe cervical spondylolisthesis, stenosis and myelopathy.  She underwent a ACDF C3-6 with C4 and C5 corpectomy and cage on 12/5/18 and C3-C5 laminectomies with C3-T1 posterior fusion on 12/7/18.  Patient tolerated the procedures well but developed weakness after surgery.  Patient did have dysphagia after her procedures and was last evaluated by SLP on 12/10/18 recommending dysphagia III with hin liquid diet.    Patient reports she has constipation/accidents from chronic opiate use. She also has urinary incontinence and frequency prior to this which she reports was due to difficulty getting to the restroom.  She reports normal sensation when going to the restroom although once she needs to go she cannot stop herself.  She reports her numbness if from her forearms down and she feels now the right arm is a little weaker but also limited by pain. She was recently weaned off of IV pain medications. At baseline she is on MS Contin 30 mg BID and Percocet.  She is followed by outside pain doctor. She reports she is hoping to have a left hip replacement in a few months after healing from the current procedure.  She was previously mostly wheelchair bound with help from friends.  Denies NVD. Denies SOB or cough.     REVIEW OF SYSTEMS:    "  Comprehensive 14 point ROS was reviewed and all were negative except as noted elsewhere in this document.     PMH:  Past Medical History:   Diagnosis Date   • Anemia    • Arthritis     osteo/hips and back   • At risk for falls    • Chronic back pain    • Dental disorder     lower denture   • Pain 12/04/2018    \"ALL OVER\", 10/10   • Urinary incontinence     using pads       PSH:  Past Surgical History:   Procedure Laterality Date   • CERVICAL FUSION POSTERIOR  12/7/2018    Procedure: CERVICAL FUSION POSTERIOR- STAGE #2 C3-5 AND C3-T1;  Surgeon: Emre Mendoza III, M.D.;  Location: [facility];  Service: Neurosurgery   • CERVICAL LAMINECTOMY POSTERIOR  12/7/2018    Procedure: CERVICAL LAMINECTOMY POSTERIOR C2-T1;  Surgeon: Emre Mendoza III, M.D.;  Location: [facility];  Service: Neurosurgery   • CERVICAL DISK AND FUSION ANTERIOR  12/5/2018    Procedure: CERVICAL DISK AND FUSION ANTERIOR-STAGE #1 C4-5;  Surgeon: Emre Mendoza III, M.D.;  Location: [facility];  Service: Neurosurgery   • CORPECTOMY  12/5/2018    Procedure: CORPECTOMY;  Surgeon: Emre Mendoza III, M.D.;  Location: [facility];  Service: Neurosurgery   • HIP ARTHROPLASTY TOTAL Right 3/20/2018    Procedure: HIP ARTHROPLASTY TOTAL;  Surgeon: Bryon Levine M.D.;  Location: [facility];  Service: Orthopedics   • KNEE ARTHROPLASTY TOTAL Right 10/20/2015    Procedure: KNEE ARTHROPLASTY TOTAL;  Surgeon: Bryon Levine M.D.;  Location: [facility];  Service:    • APPENDECTOMY LAPAROSCOPIC  3/21/2013    Performed by Dali Queen M.D. at [facility]   • DEBRIDEMENT  5/17/2012    Performed by BRADLEY DYKES at [facility]   • PLASTIC SURGERY  2004    excess skin on arms and legs removed   • MAMMOPLASTY AUGMENTATION Bilateral 2004    breast implants and \"tummy tuck\"   • GASTRIC BYPASS LAPAROSCOPIC  2002    x 2   • ABDOMINAL EXPLORATION         FAMILY " HISTORY:  Family History   Problem Relation Age of Onset   • Diabetes Mother    • Heart Disease Mother          MEDICATIONS:  Current Facility-Administered Medications   Medication Dose   • Respiratory Care per Protocol     • Pharmacy Consult Request ...Pain Management Review 1 Each  1 Each   • acetaminophen (TYLENOL) tablet 1,000 mg  1,000 mg   • oxyCODONE immediate-release (ROXICODONE) tablet 5 mg  5 mg   • oxyCODONE immediate release (ROXICODONE) tablet 10 mg  10 mg   • hydrALAZINE (APRESOLINE) tablet 25 mg  25 mg   • artificial tears 1.4 % ophthalmic solution 1 Drop  1 Drop   • benzocaine-menthol (CEPACOL) lozenge 1 Lozenge  1 Lozenge   • mag hydrox-al hydrox-simeth (MAALOX PLUS ES or MYLANTA DS) suspension 20 mL  20 mL   • ondansetron (ZOFRAN ODT) dispertab 4 mg  4 mg    Or   • ondansetron (ZOFRAN) syringe/vial injection 4 mg  4 mg   • traZODone (DESYREL) tablet 50 mg  50 mg   • sodium chloride (OCEAN) 0.65 % nasal spray 2 Spray  2 Spray   • [START ON 12/12/2018] enoxaparin (LOVENOX) inj 40 mg  40 mg   • ALPRAZolam (XANAX) tablet 0.5 mg  0.5 mg   • benzocaine-menthol (CEPACOL) lozenge 1 Lozenge  1 Lozenge   • calcium carbonate (TUMS) chewable tab 500 mg  500 mg   • docusate sodium (COLACE) capsule 100 mg  100 mg   • promethazine (PHENERGAN) tablet 12.5-25 mg  12.5-25 mg   • [START ON 12/12/2018] vitamin D (cholecalciferol) tablet 5,000 Units  5,000 Units   • [START ON 12/12/2018] cyanocobalamin (VITAMIN B12) tablet 1,000 mcg  1,000 mcg   • [START ON 12/12/2018] furosemide (LASIX) tablet 40 mg  40 mg   • gabapentin (NEURONTIN) capsule 300 mg  300 mg   • methocarbamol (ROBAXIN) tablet 1,000 mg  1,000 mg   • morphine ER (MS CONTIN) tablet 30 mg  30 mg   • neomycin sulf/polymyx B sulf/HC soln (CORTISPORIN HC SOL) 3.5-70700-6 otic solution 5 Drop  5 Drop   • [START ON 12/12/2018] potassium chloride SA (Kdur) tablet 20 mEq  20 mEq       ALLERGIES:  Patient has no known allergies.    PSYCHOSOCIAL HISTORY:  Housing /  Facility: 1 Story Apartment / Condo  Steps Into Home: 3  Steps In Home: 0  Lives with - Patient's Self Care Capacity: Alone and Able to Care For Self  Equipment Owned: Wheelchair, Tub Transfer Bench     Prior Level of Function / Living Situation:   Physical Therapy: Prior Services: Home-Independent  Housing / Facility: 1 Story Apartment / Condo  Steps Into Home: 3  Steps In Home: 0  Rail: None  Bathroom Set up: Bathtub / Shower Combination, Tub Transfer Bench  Equipment Owned: Wheelchair, Tub Transfer Bench  Lives with - Patient's Self Care Capacity: Alone and Able to Care For Self  Bed Mobility: Independent  Transfer Status: Independent  Ambulation:  (very short distance due to  L hip pain)  Distance Ambulation (Feet): 25  Assistive Devices Used: Front-Wheel Walker  Wheelchair: Independent  Stairs: Required Assist  Current Level of Function:   Level Of Assist: Maximal Assist  Assistive Device: Front Wheel Walker  Distance (Feet): 2  Deviation: Ataxic, Decreased Base Of Support, Bradykinetic, Shuffled Gait  # of Stairs Climbed: 0  Weight Bearing Status: FWB  Comments: Side steps along bed. Pt stating fatigue from getting into chair for imaging. Pt requiring step by step sequencing cues.  Supine to Sit: Maximal Assist  Sit to Supine: Maximal Assist  Scooting: Minimal Assist  Rolling: Minimal Assist to Rt., Moderate Assist to Lt.  Comments: Pt has most difficulty w/ bed mobility d/t pain. Pt w/ significant UE weakness requiring a lot of assist w/ trunk.  Sit to Stand: Moderate Assist  Bed, Chair, Wheelchair Transfer:  (Pt declining d/t being up in chair for radiology recently)  Transfer Method: Stand Pivot  Sitting in Chair: NT  Sitting Edge of Bed: 10 min  Standin min  Occupational Therapy:   Self Feeding: Independent  Grooming / Hygiene: Independent  Bathing: Independent  Dressing: Independent  Toileting: Independent  Medication Management: Independent  Laundry: Independent  Kitchen Mobility:  "Independent  Finances: Independent  Home Management: Independent  Shopping: Requires Assist  Prior Level Of Mobility: Uses Wheel Chair for Community Mobility, Uses Wheel Chair for in Home Mobility  Driving / Transportation: Relatives / Others Provide Transportation  Prior Services: Home-Independent  Housing / Facility: 1 Story Apartment / Condo  Current Level of Function:   Eating: Contact Guard Assist  Upper Body Dressing: Maximal Assist (aspen collar)  Lower Body Dressing: Total Assist (socks)  Toileting:  (catheter in place)  Speech Language Pathology:      Rehabilitation Prognosis/Potential: Good  Estimated Length of Stay: 10-14 days    CURRENT LEVEL OF FUNCTION:   Same as level of function prior to admission to [facility]  Patient with non-traumatic C4 AIS C quadraplegia on evaluation.      PHYSICAL EXAM:     VITAL SIGNS:   height is 1.6 m (5' 3\") and weight is 84.4 kg (186 lb). Her temporal temperature is 36.3 °C (97.3 °F). Her blood pressure is 106/74 and her pulse is 102 (abnormal). Her respiration is 18 and oxygen saturation is 92%.     GENERAL: No apparent distress  HEENT: Normocephalic/atraumatic, EOMI and PERRL  CARDIAC: Regular rhythm, mild tachycardia, normal S1, S2   LUNGS: Clear to auscultation; no upper airway sounds  ABDOMINAL: bowel sounds present, soft and nontender    EXTREMITIES: no contractures (mild contracture right pinky), spasticity.  Edema in RUE, BLE  No calf tenderness bilaterally,    NEURO:  Mental status:  A&Ox4 (person, place, date, situation) answers questions appropriately  Speech: fluent, no aphasia or dysarthria  CRANIAL NERVES: CN 2-12 intact  Technically difficult exam  Sensation intact to light touch and pin prick down to bilateraly C5, 50% at C6-T1, reports chest is normal, refuses lower extremity due to pain  Motor:         R       L  EF   2       3  WE  2       3  EE   2       2   FF   3       4   Sarah  2      4  Refuses LE exam at this time due to pain. "   DTRs: Unable to illicit in UE most likely due to body habitus/edema  Negative Jiménez b/l       RADIOLOGY:                                  Results for orders placed during the hospital encounter of 12/05/18   MR-CERVICAL SPINE-W/O    Impression 1.  Interval postoperative change with anterior cervical discectomy, corpectomy, cage device placement, and anterior fusion at C3-C6.  2.  Cervical spinal cord caliber narrowing at C4-5 level as seen on the preoperative scan due to severe preoperative central stenosis. Myelopathic cord signal abnormality again seen at C4-5. Interval progression of hazy cord edema signal at the C6-C7   level seen on today's exam without central stenosis at the C6-C7 level. This finding is best seen on the T2 axial images as outlined above.  3.  Postoperative, decompression of ventral epidural mass effect on the cord at C4-5 where there now appears to be generous CSF space. As mentioned above, however, a narrowed cord caliber persists.  4.  Additional findings detailed for each level above in the body of the report.     CT C Spine 12/7/18  Impression       1.  Interval posterior fusion from C2 to T1 with laminectomies at C3-4 and C4-5.  2.  The left T2 pedicle screw extends into the costovertebral joint.  3.  Deep soft tissue gas present in the anterior and posterior neck soft tissues with diffuse prevertebral soft tissue edema.  4.  Pre-existing anterior cervical fusion and corpectomy from C3 to C5.  5.  Left-sided internal jugular catheter appears to terminate in the distal aspect of the brachiocephalic vein.                           Results for orders placed during the hospital encounter of 12/05/18   CT-CTA NECK WITH & W/O-POST PROCESSING    Impression Evaluation limited by streak artifact from dense contrast in venous structures.    No significant stenosis is seen within the carotid arteries bilaterally.    Moderate stenosis in the right vertebral artery at the level of C5. Vertebral  arteries are patent bilaterally.    Postsurgical changes status post corpectomy and anterior fusion spanning C3-C6.    Postsurgical changes in the right neck with stranding, soft tissue air and a surgical drain. Mild prevertebral edema is likely postsurgical.                              C spine 12/9/18  Impression       1.  No compression deformity or acute fracture identified.    2.  Postoperative changes are noted with anterior and posterior fusion and laminectomy changes as described above.    3.  Degenerative disc disease and facet arthropathy.       LABS:  Recent Labs      12/09/18   0230  12/10/18   0245   SODIUM  139  137   POTASSIUM  3.2*  3.6   CHLORIDE  103  101   CO2  28  30   GLUCOSE  89  111*   BUN  7*  8   CREATININE  0.45*  0.55   CALCIUM  8.7  8.7     Recent Labs      12/09/18   0230  12/10/18   0245   WBC  10.7  10.4   RBC  3.93*  3.93*   HEMOGLOBIN  10.4*  10.1*   HEMATOCRIT  30.3*  30.4*   MCV  77.1*  77.4*   MCH  26.5*  25.7*   MCHC  34.3  33.2*   RDW  49.2  49.9   PLATELETCT  323  329   MPV  9.8  9.5             PRIMARY REHAB DIAGNOSIS:    This patient is a 53 y.o. female admitted for acute inpatient rehabilitation with Cervical myelopathy (HCC).    IMPAIRMENTS:   Cognitive  ADLs/IADLs  Mobility  Swallow    SECONDARY DIAGNOSIS/MEDICAL CO-MORBIDITIES AFFECTING FUNCTION:  HTN  Chronic pain  Neurogenic bladder  Edema  Hypokalemia  Vitamin B12 deficiency  Anemia    RELEVANT CHANGES SINCE PREADMISSION EVALUATION:    Status unchanged    The patient's rehabilitation potential is Good  The patient's medical prognosis is good    PLAN:   Discussion and Recommendations:   1. The patient requires an acute inpatient rehabilitation program with a coordinated program of care at an intensity and frequency not available at a lower level of care. This recommendation is substantiated by the patient's medical physicians who recommend that the patient's intervention and assessment of medical issues needs to be done  at an acute level of care for patient's safety and maximum outcome.   2. A coordinated program of care will be supplied by an interdisciplinary team of physical therapy, occupational therapy, rehab physician, rehab nursing, and, if needed, speech therapy and rehab psychology. Rehab team presents a patient-specific rehabilitation and education program concentrating on prevention of future problems related to accessibility, mobility, skin, bowel, bladder, sexuality, and psychosocial and medical/surgical problems.   3. Need for Rehabilitation Physician: The rehab physician will be evaluating the patient on a multi-weekly basis to help coordinate the program of care. The rehab physician communicates between medical physicians, therapists, and nurses to maximize the patient's potential outcome. Specific areas in which the rehab physician will be providing daily assessment include the following:   A. Assessing the patient's heart rate and blood pressure response (vitals monitoring) to activity and making adjustments in medications or conservative measures as needed.   B. The rehab physician will be assessing the frequency at which the program can be increased to allow the patient to reach optimal functional outcome.   C. The rehab physician will also provide assessments in daily skin care, especially in light of patient's impairments in mobility.   D. The rehab physician will provide special expertise in understanding how to work with functional impairment and recommend appropriate interventions, compensatory techniques, and education that will facilitate the patient's outcome.   4. Rehab R.N.   The rehab RN will be working with patient to carry over in room mobility and activities of daily living when the patient is not in 3 hours of skilled therapy. Rehab nursing will be working in conjunction with rehab physician to address all the medical issues above and continue to assess laboratory work and discuss abnormalities  with the treating physicians, assess vitals, and response to activity, and discuss and report abnormalities with the rehab physician. Rehab RN will also continue daily skin care, supervise bladder/bowel program, instruct in medication administration, and ensure patient safety.   5. Rehab Therapy: Therapies to treat at intensity and frequency of (may change after completion of evaluation by all therapeutic disciplines):       PT:  Physical therapy to address mobility, transfer, gait training and evaluation for adaptive equipment needs 1 hour/day at least 5 days/week for the duration of the ELOS (see below)       OT:  Occupational therapy to address ADLs, self-care, home management training, functional mobility/transfers and assistive device evaluation, and community re-integration 1  hour/day at least 5 days/week for the duration of the ELOS (see below).        ST/Dysphagia:  Speech therapy to address speech, language, and cognitive deficits as well as swallowing difficulties with retraining/dysphagia management and community re-integration with comprehension, expression, cognitive training 1 hour/day at least 5 days/week for the duration of the ELOS (see below).     Medical management / Rehabilitation Issues/ Adverse Potential as part of rehabilitation plan     REHABILITATION ISSUES/ADVERSE POTENTIAL:  1.  Cervical Myelopathy - Patient is s/p ACDF C3-6 with C4 and C5 corpectomy and cage on 12/5/18 and C3-C5 laminectomies with C3-T1 posterior fusion on 12/7/18 with Dr. Mendoza.  Patient demonstrates functional deficits in strength, balance, coordination, and ADL's. Patient is admitted to [facility] for comprehensive rehabilitation therapy as described below.   Rehabilitation nursing monitors bowel and bladder control, educates on medication administration, co-morbidities and monitors patient safety.    2.  Neurostimulants: None at this time but continue to assess daily for need to initiate should  status change.    3.  DVT prophylaxis:  Patient is on Lovenox for anticoagulation upon transfer. Encourage OOB. Monitor daily for signs and symptoms of DVT including but not limited to swelling and pain to prevent the development of DVT that may interfere with therapies.    4.  GI prophylaxis:  On prilosec to prevent gastritis/dyspepsia which may interfere with therapies.    5.  Pain: No issues with pain currently / Controlled with APAP scheduled, Morphine ER and Oxycodone     6.  Nutrition/Dysphagia: Dietician monitors nutrient intake, recommend supplements prn and provide nutrition education to pt/family to promote optimal nutrition for wound healing/recovery.     7.  Bladder/bowel:  Start bowel and bladder program as described below, to prevent constipation, urinary retention (which may lead to UTI), and urinary incontinence (which will impact upon pt's functional independence).   - Post void bladder scans, I&O cath for PVRs >400  - up to commode after meal     8.  Skin/dermal ulcer prophylaxis: Monitor for new skin conditions with q.2 h. turns as required to prevent the development of skin breakdown.     9.  Cognition/Behavior:  Psychologist Dr. Ervin provides adjustment counseling to illness and psychosocial barriers that may be potential barriers to rehabilitation.     10. Respiratory therapy: RT performs O2 management prn, breathing retraining, pulmonary hygiene and bronchospasm management prn to optimize participation in therapies.     MEDICAL CO-MORBIDITIES/ADVERSE POTENTIAL AFFECTING FUNCTION:  Cervical Myelopathy/C4 AIS C SCI - Patient is s/p ACDF C3-6 with C4 and C5 corpectomy and cage on 12/5/18 and C3-C5 laminectomies with C3-T1 posterior fusion on 12/7/18 with Dr. Mendoza.  Patient most likely C4 AIS C as prior neurogenic bladder and numbness.    -C collar off for showers and meals per NSG. Will need follow-up with Dr. Mendoza  -PT and OT for mobility and ADLs. Patient previously at wheelchair level due  to L hip pain.    Pain control - Patient at baseline on opiates with pain specialist.  Patient currently on Gabapentin 300 mg TID, Tylenol 1000 mg Q6, Robaxin 1000 mg QID, MS Contin 30 mg q12h and Oxycodone PRN.   -Discussed will need to decrease medication usage.  Will also be required to follow-up with pain specialist at discharge.     Dysphagia - Patient evaluated by SLP at [facility] and recommending Dysphagia 3 with thin liquids.  -SLP to evaluate.     Neurogenic bladder - Patient reports incontinence prior to surgery. She reports some sensation.  Will check PVRs and catheterize if necessary.     Neurogenic Bowel - Vs constipation from opiates. Continue to monitor.     Hypokalemia - Patient on 20 mg daily on transfer. On Lasix.     Chronic edema - Patient on Lasix 40 mg on transfer.     Anemia - Check CBC on admission.    L buttock lesion - Appears verrucous, nursing concerned for possible shingles outbreak. Per patient not noted prior. Will monitor    B12/Vit D Def - Patient on supplements on transfer.      GI Ppx - Will add Prilosec on admission.    DVT Ppx - Patient on Lovenox 40 mg daily.    I personally performed a complete drug regimen review and no potential clinically significant medication issues were identified.     Pt was seen today for 85 min, and entire time spent in face-to-face contact was >50% in counseling and coordination of care as detailed in A/P above.        GOALS/EXPECTED LEVEL OF FUNCTION BASED ON CURRENT MEDICAL AND FUNCTIONAL STATUS (may change based on patient's medical status and rate of impairment recovery):  Transfers:   Minimal Assistance  Mobility/Gait:   Minimal Assistance  ADL's:   Modified Independent  Swallowing:  Regular diet    DISPOSITION: Discharge to pre-morbid independent living setting with the supportive care of patient's signigicant other and family.    ELOS: 14-21 days    -----------Non-Face-to-Face------------  Prolonged non-face-to-face time was spent on 12/10/18 from  1315 to 1332 and 1505 to 1525 by this reviewer.  This time included medical chart review, medication review, and decision making for the appropriateness of transfer to inpatient rehabilitation.  Please see PM&R Chart Review Consult from 12/9/18 by Dr. Lee for detailed summation of the medical chart and the reasoning for current recommendations. Reviewed Dr. Lee's recommendation as well as provided recommendations on criteria to come to rehab including must discontinue IV opiates. In addition to the above, time was spent coordinating care with case management as well as transitional care coordination team in the acceptance and transfer of the patient to the inpatient rehabilitation facility setting.

## 2018-12-11 NOTE — PROGRESS NOTES
RN received a call from a worker at acute rehab regarding possible discharge to their facility today.

## 2018-12-11 NOTE — FLOWSHEET NOTE
12/11/18 1141   Type of Assessment   Assessment Yes   Patient History   Pulmonary Diagnosis no   Surgical Procedures cervical surgery   Home O2 No   Home Treatments/Frequency No   COPD Risk Screening   Do you have a history of COPD? No   Smoking History   Have you ever smoked Never   Level Of Consciousness   Level of Consciousness Alert   Respiratory WDL   Respiratory (WDL) X   Chest Exam   Respiration 18   Pulse (!) 124  (VERO Iglesias aware.  Pt has been tachycardic)   Oximetry   #Pulse Oximetry (Single Determination) Yes   Oxygen   Home O2 Use Prior To Admission? No   Pulse Oximetry 95 %   O2 Daily Delivery Respiratory  Room Air with O2 Available        12/11/18 1141   Type of Assessment   Assessment Yes   Patient History   Pulmonary Diagnosis no   Surgical Procedures cervical surgery   Home O2 No   Home Treatments/Frequency No   COPD Risk Screening   Do you have a history of COPD? No   Smoking History   Have you ever smoked Never   Level Of Consciousness   Level of Consciousness Alert   Respiratory WDL   Respiratory (WDL) X   Chest Exam   Respiration 18   Pulse (!) 124  (VERO Iglesias aware.  Pt has been tachycardic)   Oximetry   #Pulse Oximetry (Single Determination) Yes   Oxygen   Home O2 Use Prior To Admission? No   Pulse Oximetry 95 %   O2 Daily Delivery Respiratory  Room Air with O2 Available

## 2018-12-11 NOTE — PROGRESS NOTES
Neurosurgery Progress Note    Subjective:  POD #6 C3-6 ACFD with C4 and C5 corpectomy and cage.  POD#4 C3-5 lami with C3-T1 fusion.  Continues with right upper extremity pain. She seems to feel better with sequential stocking applied.   Has been off IV pain meds X24 hours.   Accepted to rehab.    Exam:  Wound C/D/I   Arms diffusely weak, but equal bilaterally.  Sensory improved        BP  Min: 98/65  Max: 120/60  Pulse  Av.4  Min: 97  Max: 122  Resp  Av.5  Min: 16  Max: 18  Temp  Av.8 °C (98.2 °F)  Min: 36.7 °C (98 °F)  Max: 36.9 °C (98.5 °F)  SpO2  Av.2 %  Min: 92 %  Max: 100 %    No Data Recorded    Recent Labs      18   0230  12/10/18   0245   WBC  10.7  10.4   RBC  3.93*  3.93*   HEMOGLOBIN  10.4*  10.1*   HEMATOCRIT  30.3*  30.4*   MCV  77.1*  77.4*   MCH  26.5*  25.7*   MCHC  34.3  33.2*   RDW  49.2  49.9   PLATELETCT  323  329   MPV  9.8  9.5     Recent Labs      18   0230  12/10/18   0245   SODIUM  139  137   POTASSIUM  3.2*  3.6   CHLORIDE  103  101   CO2  28  30   GLUCOSE  89  111*   BUN  7*  8   CREATININE  0.45*  0.55   CALCIUM  8.7  8.7               Intake/Output       12/10/18 07 - 18 0659 18 07 - 18 0659       Total 1900-0659 Total       Intake    P.O.  320  -- 320  240  -- 240    P.O. 320 -- 320 240 -- 240    I.V.  --  -- --  1101.7  -- 1101.7    Volume (mL) (NS infusion) -- -- -- 1101.7 -- 1101.7    Total Intake 320 -- 320 1341.7 -- 1341.7       Output    Urine  700  -- 700  --  -- --    Number of Times Voided 4 x -- 4 x 3 x -- 3 x    Urine Void (mL) 700 -- 700 -- -- --    Total Output 700 -- 700 -- -- --       Net I/O     -380 -- -380 1341.7 -- 1341.7            Intake/Output Summary (Last 24 hours) at 18 0747  Last data filed at 18 0700   Gross per 24 hour   Intake          1661.67 ml   Output              700 ml   Net           961.67 ml            • neomycin sulf/polymyx B sulf/HC soln  5 Drop 4X/DAY    • methocarbamol  1,000 mg 4X/DAY   • gabapentin  300 mg TID   • oxyCODONE immediate-release  15 mg Q3HRS PRN   • NS   Continuous   • diphenhydrAMINE  25 mg Q6HRS PRN   • enoxaparin  40 mg DAILY   • ALPRAZolam  0.5 mg TID PRN   • cyanocobalamin  1,000 mcg DAILY   • furosemide  40 mg DAILY   • morphine ER  30 mg Q12HRS   • multivitamin  1 Tab DAILY   • potassium chloride SA  20 mEq DAILY   • Pharmacy Consult Request  1 Each PRN   • MD ALERT...DO NOT ADMINISTER NSAIDS or ASPIRIN unless ORDERED By Neurosurgery  1 Each PRN   • docusate sodium  100 mg BID   • senna-docusate  1 Tab Nightly   • senna-docusate  1 Tab Q24HRS PRN   • polyethylene glycol/lytes  1 Packet BID PRN   • magnesium hydroxide  30 mL QDAY PRN   • bisacodyl  10 mg Q24HRS PRN   • fleet  1 Each Once PRN   • NS   Continuous   • oxyCODONE immediate-release  5 mg Q3HRS PRN   • ondansetron  4 mg Q4HRS PRN   • ondansetron  4 mg Q4HRS PRN   • promethazine  12.5-25 mg Q4HRS PRN   • promethazine  12.5-25 mg Q4HRS PRN   • prochlorperazine  5-10 mg Q4HRS PRN   • benzocaine-menthol  1 Lozenge Q2HRS PRN   • calcium carbonate  500 mg BID   • vitamin D  5,000 Units DAILY   • hydrALAZINE  10 mg Q HOUR PRN       Assessment and Plan:  POD #6 C3-6 ACFD with C4 and C5 corpectomy and cage  POD#4 C3-5 lami with C3-T1 fusion.  Has been unable to mobilize due to severe left hip OA  Was using wheelchair pre op  Cleared for transfer to rehab.  D/C central line.

## 2018-12-11 NOTE — DISCHARGE PLANNING
Anticipated Discharge Disposition:   IRF    Action:   Chart review: ana COREY note, ok to transfer pt to IRF .  Notified Luc at IRF    Addendum:  Rn asking CM to help pt with bill statement for pt to submit to [employer].   CM called [facility], CM was told that pt will not receive a bill statement until insurance settles. And [facility] cannot assist a 3rd party insurance.     Barriers to Discharge:   Confirmation of  time    Plan:   Dc to IRF today.

## 2018-12-11 NOTE — DISCHARGE INSTRUCTIONS
Discharge Instructions    Discharged to other by [facility] with escort. Discharged via wheelchair, hospital escort: Yes.  Special equipment needed: C-Collar    Be sure to schedule a follow-up appointment with your primary care doctor or any specialists as instructed.     Discharge Plan:   Diet Plan: Discussed  Activity Level: Discussed  Confirmed Follow up Appointment: Appointment Scheduled  Confirmed Symptoms Management: Discussed  Medication Reconciliation Updated: Yes  Influenza Vaccine Indication: Not indicated: Previously immunized this influenza season and > 8 years of age    I understand that a diet low in cholesterol, fat, and sodium is recommended for good health. Unless I have been given specific instructions below for another diet, I accept this instruction as my diet prescription.   Other diet: Dysphagia 3/Thin liquids     Special Instructions: None    · Is patient discharged on Warfarin / Coumadin?   No     Depression / Suicide Risk    As you are discharged from this Counts include 234 beds at the [facility] facility, it is important to learn how to keep safe from harming yourself.    Recognize the warning signs:  · Abrupt changes in personality, positive or negative- including increase in energy   · Giving away possessions  · Change in eating patterns- significant weight changes-  positive or negative  · Change in sleeping patterns- unable to sleep or sleeping all the time   · Unwillingness or inability to communicate  · Depression  · Unusual sadness, discouragement and loneliness  · Talk of wanting to die  · Neglect of personal appearance   · Rebelliousness- reckless behavior  · Withdrawal from people/activities they love  · Confusion- inability to concentrate     If you or a loved one observes any of these behaviors or has concerns about self-harm, here's what you can do:  · Talk about it- your feelings and reasons for harming yourself  · Remove any means that you might use to hurt yourself (examples: pills, rope, extension  cords, firearm)  · Get professional help from the community (Mental Health, Substance Abuse, psychological counseling)  · Do not be alone:Call your Safe Contact- someone whom you trust who will be there for you.  · Call your local CRISIS HOTLINE 906-8623 or 328-623-2465  · Call your local Children's Mobile Crisis Response Team [place] (570) 886-3925 or www.studdex  · Call the toll free National Suicide Prevention Hotlines   · National Suicide Prevention Lifeline 153-393-JKUT (4103)  · National Hope Line Network 800-SUICIDE (913-2236)

## 2018-12-11 NOTE — PROGRESS NOTES
ROMA Hart was called regarding the patient's current slp recommendation: crush pills and float in puree, and scheduled morning ms contin which our h expressly stated do NOT crush. ROMA stated she had been getting it floated whole and to offer the med floated whole without crushing the med.

## 2018-12-11 NOTE — DISCHARGE PLANNING
Dr. Harper has accepted.  Transport has been arranged for 1030.  Khalida, JB [id number] and RHINA PersaudN [id number] are aware.

## 2018-12-11 NOTE — CARE PLAN
Problem: Venous Thromboembolism (VTW)/Deep Vein Thrombosis (DVT) Prevention:  Goal: Patient will participate in Venous Thrombosis (VTE)/Deep Vein Thrombosis (DVT)Prevention Measures  Outcome: PROGRESSING AS EXPECTED      Problem: Pain Management  Goal: Pain level will decrease to patient's comfort goal  Outcome: PROGRESSING AS EXPECTED      Problem: Mobility  Goal: Risk for activity intolerance will decrease  Outcome: PROGRESSING SLOWER THAN EXPECTED

## 2018-12-12 LAB
25(OH)D3 SERPL-MCNC: 14 NG/ML (ref 30–100)
ALBUMIN SERPL BCP-MCNC: 2.9 G/DL (ref 3.2–4.9)
ALBUMIN/GLOB SERPL: 0.9 G/DL
ALP SERPL-CCNC: 82 U/L (ref 30–99)
ALT SERPL-CCNC: 10 U/L (ref 2–50)
ANION GAP SERPL CALC-SCNC: 6 MMOL/L (ref 0–11.9)
AST SERPL-CCNC: 16 U/L (ref 12–45)
BASOPHILS # BLD AUTO: 0.6 % (ref 0–1.8)
BASOPHILS # BLD: 0.05 K/UL (ref 0–0.12)
BILIRUB SERPL-MCNC: 0.3 MG/DL (ref 0.1–1.5)
BUN SERPL-MCNC: 10 MG/DL (ref 8–22)
CALCIUM SERPL-MCNC: 9 MG/DL (ref 8.5–10.5)
CHLORIDE SERPL-SCNC: 101 MMOL/L (ref 96–112)
CO2 SERPL-SCNC: 31 MMOL/L (ref 20–33)
CREAT SERPL-MCNC: 0.53 MG/DL (ref 0.5–1.4)
EOSINOPHIL # BLD AUTO: 0.58 K/UL (ref 0–0.51)
EOSINOPHIL NFR BLD: 6.9 % (ref 0–6.9)
ERYTHROCYTE [DISTWIDTH] IN BLOOD BY AUTOMATED COUNT: 50.1 FL (ref 35.9–50)
GLOBULIN SER CALC-MCNC: 3.1 G/DL (ref 1.9–3.5)
GLUCOSE SERPL-MCNC: 100 MG/DL (ref 65–99)
HCT VFR BLD AUTO: 29.8 % (ref 37–47)
HGB BLD-MCNC: 9.8 G/DL (ref 12–16)
IMM GRANULOCYTES # BLD AUTO: 0.03 K/UL (ref 0–0.11)
IMM GRANULOCYTES NFR BLD AUTO: 0.4 % (ref 0–0.9)
LYMPHOCYTES # BLD AUTO: 2.01 K/UL (ref 1–4.8)
LYMPHOCYTES NFR BLD: 24 % (ref 22–41)
MCH RBC QN AUTO: 25.6 PG (ref 27–33)
MCHC RBC AUTO-ENTMCNC: 32.9 G/DL (ref 33.6–35)
MCV RBC AUTO: 77.8 FL (ref 81.4–97.8)
MONOCYTES # BLD AUTO: 0.76 K/UL (ref 0–0.85)
MONOCYTES NFR BLD AUTO: 9.1 % (ref 0–13.4)
NEUTROPHILS # BLD AUTO: 4.93 K/UL (ref 2–7.15)
NEUTROPHILS NFR BLD: 59 % (ref 44–72)
NRBC # BLD AUTO: 0 K/UL
NRBC BLD-RTO: 0 /100 WBC
PLATELET # BLD AUTO: 338 K/UL (ref 164–446)
PMV BLD AUTO: 9.8 FL (ref 9–12.9)
POTASSIUM SERPL-SCNC: 3.7 MMOL/L (ref 3.6–5.5)
PROT SERPL-MCNC: 6 G/DL (ref 6–8.2)
RBC # BLD AUTO: 3.83 M/UL (ref 4.2–5.4)
SODIUM SERPL-SCNC: 138 MMOL/L (ref 135–145)
TSH SERPL DL<=0.005 MIU/L-ACNC: 2.96 UIU/ML (ref 0.38–5.33)
WBC # BLD AUTO: 8.4 K/UL (ref 4.8–10.8)

## 2018-12-12 PROCEDURE — 80053 COMPREHEN METABOLIC PANEL: CPT

## 2018-12-12 PROCEDURE — 99255 IP/OBS CONSLTJ NEW/EST HI 80: CPT | Performed by: HOSPITALIST

## 2018-12-12 PROCEDURE — 97530 THERAPEUTIC ACTIVITIES: CPT

## 2018-12-12 PROCEDURE — 700102 HCHG RX REV CODE 250 W/ 637 OVERRIDE(OP): Performed by: HOSPITALIST

## 2018-12-12 PROCEDURE — 700101 HCHG RX REV CODE 250: Performed by: PHYSICAL MEDICINE & REHABILITATION

## 2018-12-12 PROCEDURE — 770010 HCHG ROOM/CARE - REHAB SEMI PRIVAT*

## 2018-12-12 PROCEDURE — 84443 ASSAY THYROID STIM HORMONE: CPT

## 2018-12-12 PROCEDURE — A9270 NON-COVERED ITEM OR SERVICE: HCPCS | Performed by: HOSPITALIST

## 2018-12-12 PROCEDURE — A9270 NON-COVERED ITEM OR SERVICE: HCPCS | Performed by: PHYSICAL MEDICINE & REHABILITATION

## 2018-12-12 PROCEDURE — 700112 HCHG RX REV CODE 229: Performed by: PHYSICAL MEDICINE & REHABILITATION

## 2018-12-12 PROCEDURE — 99233 SBSQ HOSP IP/OBS HIGH 50: CPT | Performed by: PHYSICAL MEDICINE & REHABILITATION

## 2018-12-12 PROCEDURE — 83036 HEMOGLOBIN GLYCOSYLATED A1C: CPT

## 2018-12-12 PROCEDURE — 97535 SELF CARE MNGMENT TRAINING: CPT

## 2018-12-12 PROCEDURE — 92610 EVALUATE SWALLOWING FUNCTION: CPT

## 2018-12-12 PROCEDURE — 700102 HCHG RX REV CODE 250 W/ 637 OVERRIDE(OP): Performed by: PHYSICAL MEDICINE & REHABILITATION

## 2018-12-12 PROCEDURE — 700111 HCHG RX REV CODE 636 W/ 250 OVERRIDE (IP): Performed by: PHYSICAL MEDICINE & REHABILITATION

## 2018-12-12 PROCEDURE — 85025 COMPLETE CBC W/AUTO DIFF WBC: CPT

## 2018-12-12 PROCEDURE — 82306 VITAMIN D 25 HYDROXY: CPT

## 2018-12-12 PROCEDURE — 97163 PT EVAL HIGH COMPLEX 45 MIN: CPT

## 2018-12-12 PROCEDURE — 97167 OT EVAL HIGH COMPLEX 60 MIN: CPT

## 2018-12-12 RX ORDER — BISACODYL 10 MG
10 SUPPOSITORY, RECTAL RECTAL DAILY
Status: DISCONTINUED | OUTPATIENT
Start: 2018-12-12 | End: 2018-12-26 | Stop reason: HOSPADM

## 2018-12-12 RX ORDER — VALACYCLOVIR HYDROCHLORIDE 500 MG/1
1000 TABLET, FILM COATED ORAL 3 TIMES DAILY
Status: COMPLETED | OUTPATIENT
Start: 2018-12-12 | End: 2018-12-19

## 2018-12-12 RX ORDER — POLYETHYLENE GLYCOL 3350 17 G/17G
1 POWDER, FOR SOLUTION ORAL
Status: DISCONTINUED | OUTPATIENT
Start: 2018-12-12 | End: 2018-12-26 | Stop reason: HOSPADM

## 2018-12-12 RX ORDER — AMOXICILLIN 250 MG
1 CAPSULE ORAL DAILY
Status: DISCONTINUED | OUTPATIENT
Start: 2018-12-12 | End: 2018-12-12

## 2018-12-12 RX ORDER — BISACODYL 10 MG
10 SUPPOSITORY, RECTAL RECTAL
Status: DISCONTINUED | OUTPATIENT
Start: 2018-12-12 | End: 2018-12-17

## 2018-12-12 RX ADMIN — STANDARDIZED SENNA CONCENTRATE AND DOCUSATE SODIUM 1 TABLET: 8.6; 5 TABLET, FILM COATED ORAL at 16:20

## 2018-12-12 RX ADMIN — ACETAMINOPHEN 1000 MG: 500 TABLET, FILM COATED ORAL at 17:35

## 2018-12-12 RX ADMIN — FUROSEMIDE 40 MG: 40 TABLET ORAL at 08:53

## 2018-12-12 RX ADMIN — GABAPENTIN 300 MG: 300 CAPSULE ORAL at 16:20

## 2018-12-12 RX ADMIN — OXYCODONE HYDROCHLORIDE 10 MG: 10 TABLET ORAL at 06:17

## 2018-12-12 RX ADMIN — ACETAMINOPHEN 1000 MG: 500 TABLET, FILM COATED ORAL at 05:42

## 2018-12-12 RX ADMIN — MORPHINE SULFATE 30 MG: 30 TABLET, EXTENDED RELEASE ORAL at 21:45

## 2018-12-12 RX ADMIN — OXYCODONE HYDROCHLORIDE 10 MG: 10 TABLET ORAL at 16:20

## 2018-12-12 RX ADMIN — Medication 60 ML: at 23:34

## 2018-12-12 RX ADMIN — NEOMYCIN SULFATE, POLYMYXIN B SULFATE, HYDROCORTISONE 4 DROP: 3.5; 10000; 1 SOLUTION/ DROPS AURICULAR (OTIC) at 09:00

## 2018-12-12 RX ADMIN — VALACYCLOVIR HYDROCHLORIDE 1000 MG: 500 TABLET, FILM COATED ORAL at 21:44

## 2018-12-12 RX ADMIN — CALCIUM CARBONATE (ANTACID) CHEW TAB 500 MG 500 MG: 500 CHEW TAB at 21:43

## 2018-12-12 RX ADMIN — ENOXAPARIN SODIUM 40 MG: 100 INJECTION SUBCUTANEOUS at 09:26

## 2018-12-12 RX ADMIN — DOCUSATE SODIUM 100 MG: 100 CAPSULE, LIQUID FILLED ORAL at 21:44

## 2018-12-12 RX ADMIN — MORPHINE SULFATE 30 MG: 30 TABLET, EXTENDED RELEASE ORAL at 08:54

## 2018-12-12 RX ADMIN — OXYCODONE HYDROCHLORIDE 10 MG: 10 TABLET ORAL at 21:45

## 2018-12-12 RX ADMIN — CALCIUM CARBONATE (ANTACID) CHEW TAB 500 MG 500 MG: 500 CHEW TAB at 08:54

## 2018-12-12 RX ADMIN — GABAPENTIN 300 MG: 300 CAPSULE ORAL at 21:45

## 2018-12-12 RX ADMIN — NEOMYCIN SULFATE, POLYMYXIN B SULFATE, HYDROCORTISONE 4 DROP: 3.5; 10000; 1 SOLUTION/ DROPS AURICULAR (OTIC) at 12:51

## 2018-12-12 RX ADMIN — GABAPENTIN 300 MG: 300 CAPSULE ORAL at 08:53

## 2018-12-12 RX ADMIN — POTASSIUM CHLORIDE 20 MEQ: 1500 TABLET, EXTENDED RELEASE ORAL at 08:53

## 2018-12-12 RX ADMIN — VALACYCLOVIR HYDROCHLORIDE 1000 MG: 500 TABLET, FILM COATED ORAL at 17:35

## 2018-12-12 RX ADMIN — MAGNESIUM HYDROXIDE 30 ML: 400 SUSPENSION ORAL at 17:36

## 2018-12-12 RX ADMIN — METHOCARBAMOL 1000 MG: 500 TABLET ORAL at 08:53

## 2018-12-12 RX ADMIN — ACETAMINOPHEN 1000 MG: 500 TABLET, FILM COATED ORAL at 12:46

## 2018-12-12 RX ADMIN — Medication 60 ML: at 09:00

## 2018-12-12 RX ADMIN — NEOMYCIN SULFATE, POLYMYXIN B SULFATE, HYDROCORTISONE 4 DROP: 3.5; 10000; 1 SOLUTION/ DROPS AURICULAR (OTIC) at 21:00

## 2018-12-12 RX ADMIN — SENNOSIDES AND DOCUSATE SODIUM 1 TABLET: 8.6; 5 TABLET ORAL at 08:53

## 2018-12-12 RX ADMIN — OXYCODONE HYDROCHLORIDE 10 MG: 10 TABLET ORAL at 12:46

## 2018-12-12 RX ADMIN — METHOCARBAMOL 1000 MG: 500 TABLET ORAL at 12:53

## 2018-12-12 RX ADMIN — VITAMIN D, TAB 1000IU (100/BT) 5000 UNITS: 25 TAB at 08:52

## 2018-12-12 RX ADMIN — DOCUSATE SODIUM 100 MG: 100 CAPSULE, LIQUID FILLED ORAL at 08:53

## 2018-12-12 RX ADMIN — OMEPRAZOLE 20 MG: 20 CAPSULE, DELAYED RELEASE ORAL at 09:00

## 2018-12-12 RX ADMIN — BISACODYL 10 MG: 10 SUPPOSITORY RECTAL at 21:46

## 2018-12-12 RX ADMIN — SENNOSIDES AND DOCUSATE SODIUM 1 TABLET: 8.6; 5 TABLET ORAL at 21:44

## 2018-12-12 RX ADMIN — METHOCARBAMOL 1000 MG: 500 TABLET ORAL at 21:45

## 2018-12-12 RX ADMIN — METHOCARBAMOL 1000 MG: 500 TABLET ORAL at 16:20

## 2018-12-12 RX ADMIN — ALPRAZOLAM 0.5 MG: 0.5 TABLET ORAL at 21:44

## 2018-12-12 RX ADMIN — Medication 60 ML: at 16:06

## 2018-12-12 RX ADMIN — CYANOCOBALAMIN TAB 1000 MCG 1000 MCG: 1000 TAB at 08:54

## 2018-12-12 ASSESSMENT — BRIEF INTERVIEW FOR MENTAL STATUS (BIMS)
ASKED TO RECALL BLUE: YES, NO CUE REQUIRED
BIMS SUMMARY SCORE: 15
WHAT DAY OF THE WEEK IS IT: CORRECT
WHAT MONTH IS IT: ACCURATE WITHIN 5 DAYS
ASKED TO RECALL BED: YES, NO CUE REQUIRED
WHAT YEAR IS IT: CORRECT
INITIAL REPETITION OF BED BLUE SOCK - FIRST ATTEMPT: 3
ASKED TO RECALL SOCK: YES, NO CUE REQUIRED

## 2018-12-12 ASSESSMENT — PAIN SCALES - GENERAL
PAINLEVEL_OUTOF10: 8
PAINLEVEL_OUTOF10: 9
PAINLEVEL_OUTOF10: 10

## 2018-12-12 ASSESSMENT — ACTIVITIES OF DAILY LIVING (ADL): TOILETING: REQUIRES ASSIST

## 2018-12-12 NOTE — DISCHARGE SUMMARY
DICTATED FOR:  Emre Mendoza III, MD    DATE OF ADMISSION:  12/05/2018    DATE OF DISCHARGE:  12/11/2018    DISCHARGE DIAGNOSES:  1.  Cervical spondylosis.  2.  Cervical spinal stenosis.  3.  Cervical spondylolisthesis.  4.  Cervical kyphosis.  5.  Cervical myelopathy.  6.  Severe left hip osteoarthritis.  7.  Debility and deconditioning.    SURGERIES PERFORMED.  1.  C4-C5 corpectomy with C3 through C6 anterior cervical plating performed   12/05/2018 by Dr. Emre Mendoza.  2.  C3-C4 laminectomies with C3 through T1 posterior spinal fusion performed   on 12/07/2018 by Dr. Emre Mendoza.    COMPLICATIONS:  None.    REASON FOR ADMISSION:  This is a 53-year-old female with severe cervical   kyphotic deformity and cervical spondylolisthesis with severe cervical   stenosis with myelopathy.  Due to the degree of stenosis, she was indicated   for surgical decompression and stabilization.    HOSPITAL COURSE:  The patient was admitted on date of surgery #1.  She   underwent that surgery without complication.  After recovery, she was   transferred to neurosurgical floor.  On 12/07/2018, she was taken back to the   operating room for the planned stage II surgery, that surgery as well went   without complication.  After recovery, she was then transferred to the   neurosurgical floor, but she has been very slow to mobilize as she does have   severe myelopathy as well as left hip osteoarthritis, with very poor ability   to ambulate.  She has been seen by the rehab service.  She has been cleared   for rehab and at this point, she has now been accepted and cleared for   transfer.    INSTRUCTION ON DATE OF TRANSFER:  Diet is regular as tolerated.  She can mobilize as tolerated with her cervical   collar in place.    DISCHARGE MEDICATIONS:  Include:  1.  Xanax 0.5 mg p.o. 3 times daily p.r.n. anxiety.  2.  Cepacol lozenges as needed.  3.  Dulcolax suppository 10 mg ME daily p.r.n. constipation.  4.  Tums 500 mg twice daily p.r.n.  indigestion.  5.  Vitamin B12 1000 mcg p.o. daily.  6.  Benadryl tablet 25 mg p.o. q. 6 hours p.r.n. itching.  7.  Colace 100 mg p.o. twice daily, hold for loose stools.  8.  Lovenox 40 mg subcutaneously daily.  9.  Fleet enema daily p.r.n. constipation.  10.  Lasix 40 mg p.o. daily.  11.  Neurontin 300 mg p.o. 3 times daily.  12.  Apresoline injection 10 mg IV every hour as needed for systolic pressure   greater than 160.  13.  Milk of magnesia 30 mL p.o. daily p.r.n. constipation.  14.  Robaxin 1000 mg 4 times daily.  15.  MS Contin 30 mg every 12 hours.  16.  Theragran multivitamin 1 p.o. daily.  17.  Zofran 4 mg p.o. q.4 hours p.r.n. nausea.  18.  Oxycodone 15 mg p.o. every 3 hours p.r.n. for breakthrough pain.  19.  MiraLax 1 packet dissolved in water daily.  20.  Potassium chloride 20 mEq p.o. daily.  21.  Phenergan suppository 12.5 mg CA at q.4 hours for nausea.  22.  Milagro-Colace daily.  23.  Vitamin D 5000 units p.o. daily.    All the patient's questions have been answered and she is now cleared for   transfer to rehab.       ____________________________________     ROMA CASTILLO / RICHARD    DD:  12/11/2018 08:15:50  DT:  12/11/2018 22:56:38    D#:  [id number]  Job#:  [id number]

## 2018-12-12 NOTE — THERAPY
Speech Therapy Initial Plan of Care Note    1) Assessment:  Patient is 53 y.o. female with a diagnosis of cervical myelopathy.  Additional factors influencing patient status / progress (ie: cognitive factors, co-morbidities, social support, etc): lives alone, quadriplegia.   2) Plan:  Recommend d/c Speech Therapy services, no further dysphagia intervention warranted. Pt to remain on regular textures with modifications made as needed (i.e. Cutting sandwiches, meats).

## 2018-12-12 NOTE — THERAPY
Occupational Therapy Initial Plan of Care Note    1) Assessment:  Patient is 53 y.o. female with a diagnosis of C1-4 Incomplete Quadriplegia (C4 AIS C), s/p ACDF C3-6 and C3-5 laminectomies with C3-T1 posterior fusion.  Patient lives alone in a 1st floor apartment and has required some assistance with adls, transfers and mobility prior to admission.  Patient's boyfriend has been assisting patient when needed.  Additional factors influencing patient status / progress (ie: cognitive factors, co-morbidities, social support, etc): lives alone, but has a boyfriend that can assist at times, left hip pain and in need of replacement, HTN, spinal precautions with c-collar.  Long term and short term goals have been discussed with patient and they are in agreement.  2) Plan:  Recommend Occupational Therapy  minutes per day 5-7 days per week for 4 weeks for the following treatments:  OT E Stim Attended, OT Self Care/ADL, OT Community Reintegration, OT Manual Ther Technique, OT Neuro Re-Ed/Balance, OT Sensory Int Techniques, OT Therapeutic Activity, OT Aquatic Therapy, OT Evaluation and OT Therapeutic Exercise.  3) Goals:  Please refer to care plan for goals.

## 2018-12-12 NOTE — CONSULTS
"  HOSPITAL MEDICINE CONSULTATION    Requesting Physician:  Dr. Harper    Reason for Consult:  Edema    History of Present Illness:  The patient is a 53-year-old  female with past medical history significant for gastric bypass surgery, hypertension, chronic leg swelling, and severe osteoarthritis.  She also has a history of cervical stenosis with myelopathy.  She was admitted to [facility] on 12/5/18 for elective surgery.  The patient underwent cervical laminectomy and fusion by Dr. Mendoza.  Due to her ongoing functional debility, the patient was transferred to [facility] on 12/11/18.  Hospital Medicine consultation is requested to assist in the management of this patient's lower extremity edema.  She states that she was told her leg swelling is possibly from her hip arthritis versus water retention versus lymphedema.  She also complains of a rash on her buttock that \"burns\" and wound care has noted vesicular lesions.    Review of Systems:  Review of Systems   Constitutional: Negative for chills and fever.   HENT: Negative.    Eyes: Negative.    Respiratory: Negative for cough and shortness of breath.    Cardiovascular: Positive for leg swelling. Negative for chest pain and palpitations.   Gastrointestinal: Negative for abdominal pain, nausea and vomiting.   Musculoskeletal: Positive for neck pain.        Wound pain   Skin: Positive for rash.   Neurological: Positive for focal weakness.       Allergies:  No Known Allergies    Medications:    Current Facility-Administered Medications:   •  neomycin sulf/polymyx B sulf/HC soln (CORTISPORIN HC SOL) 3.5-44999-9 otic solution 4 Drop, 4 Drop, Both Ears, PRN, Erendira Harper M.D.  •  bisacodyl (DULCOLAX) suppository 10 mg, 10 mg, Rectal, DAILY, Erendira Harper M.D., 10 mg at 12/12/18 2147  •  polyethylene glycol/lytes (MIRALAX) PACKET 1 Packet, 1 Packet, Oral, QDAY PRN, Erendira Harper, " M.D.  •  magnesium hydroxide (MILK OF MAGNESIA) suspension 30 mL, 30 mL, Oral, QDAY PRN, Erendira Harper M.D., 30 mL at 12/12/18 1736  •  bisacodyl (DULCOLAX) suppository 10 mg, 10 mg, Rectal, QDAY PRN, Erendira Harper M.D.  •  valACYclovir (VALTREX) caplet 1,000 mg, 1,000 mg, Oral, TID, Mere Wade M.D., 1,000 mg at 12/13/18 1635  •  Respiratory Care per Protocol, , Nebulization, Continuous RT, Erendira Harper M.D.  •  Pharmacy Consult Request ...Pain Management Review 1 Each, 1 Each, Other, PRN, Erendira Harper M.D.  •  acetaminophen (TYLENOL) tablet 1,000 mg, 1,000 mg, Oral, Q6HRS, Erendira Harper M.D., 1,000 mg at 12/13/18 1816  •  oxyCODONE immediate-release (ROXICODONE) tablet 5 mg, 5 mg, Oral, Q3HRS PRN, Erendira Harper M.D.  •  oxyCODONE immediate release (ROXICODONE) tablet 10 mg, 10 mg, Oral, Q3HRS PRN, Erendira Harper M.D., 10 mg at 12/13/18 1632  •  hydrALAZINE (APRESOLINE) tablet 25 mg, 25 mg, Oral, Q8HRS PRN, Erendira Harper M.D.  •  artificial tears 1.4 % ophthalmic solution 1 Drop, 1 Drop, Both Eyes, PRN, Erendira Harper M.D.  •  benzocaine-menthol (CEPACOL) lozenge 1 Lozenge, 1 Lozenge, Mouth/Throat, Q2HRS PRN, Erendira Harper M.D., 1 Lozenge at 12/11/18 2040  •  mag hydrox-al hydrox-simeth (MAALOX PLUS ES or MYLANTA DS) suspension 20 mL, 20 mL, Oral, Q2HRS PRN, Erednira Harper M.D.  •  ondansetron (ZOFRAN ODT) dispertab 4 mg, 4 mg, Oral, 4X/DAY PRN **OR** ondansetron (ZOFRAN) syringe/vial injection 4 mg, 4 mg, Intramuscular, 4X/DAY PRN, Erendira Harper M.D.  •  traZODone (DESYREL) tablet 50 mg, 50 mg, Oral, QHS PRN, Erendira Harper M.D.  •  sodium chloride (OCEAN) 0.65 % nasal spray 2 Spray, 2 Spray, Nasal, PRN, Erendira Harper M.D.  •  enoxaparin (LOVENOX) inj 40 mg, 40 mg, Subcutaneous, DAILY, Erendira Harper M.D., 40 mg at 12/13/18 1008  •  ALPRAZolam (XANAX) tablet  "0.5 mg, 0.5 mg, Oral, TID PRN, Erendira Harper M.D., 0.5 mg at 12/12/18 2144  •  calcium carbonate (TUMS) chewable tab 500 mg, 500 mg, Oral, BID, Erendira Harper M.D., 500 mg at 12/13/18 0935  •  docusate sodium (COLACE) capsule 100 mg, 100 mg, Oral, BID, Erendira Harper M.D., 100 mg at 12/13/18 1008  •  promethazine (PHENERGAN) tablet 12.5-25 mg, 12.5-25 mg, Oral, Q4HRS PRN, Erendira Harper M.D.  •  vitamin D (cholecalciferol) tablet 5,000 Units, 5,000 Units, Oral, DAILY, Erendira Harper M.D., 5,000 Units at 12/13/18 1008  •  cyanocobalamin (VITAMIN B12) tablet 1,000 mcg, 1,000 mcg, Oral, DAILY, Erendira Harper M.D., 1,000 mcg at 12/13/18 0935  •  furosemide (LASIX) tablet 40 mg, 40 mg, Oral, DAILY, Erendira Harper M.D., 40 mg at 12/13/18 0935  •  gabapentin (NEURONTIN) capsule 300 mg, 300 mg, Oral, TID, Erendira Harper M.D., 300 mg at 12/13/18 1632  •  methocarbamol (ROBAXIN) tablet 1,000 mg, 1,000 mg, Oral, 4X/DAY, Erendira Harper M.D., 1,000 mg at 12/13/18 1632  •  morphine ER (MS CONTIN) tablet 30 mg, 30 mg, Oral, Q12HRS, Erendira Harper M.D., 30 mg at 12/13/18 0935  •  potassium chloride SA (Kdur) tablet 20 mEq, 20 mEq, Oral, DAILY, Erendira Harper M.D., 20 mEq at 12/13/18 0935  •  normal saline PF 60 mL, 60 mL, Intravenous, Q12HRS, Erendira Harper M.D., 60 mL at 12/13/18 1020  •  senna-docusate (PERICOLACE or SENOKOT S) 8.6-50 MG per tablet 1 Tab, 1 Tab, Oral, BID, Erendira Harper M.D., 1 Tab at 12/13/18 0935  •  omeprazole (PRILOSEC) capsule 20 mg, 20 mg, Oral, DAILY, Erendira Harper M.D., 20 mg at 12/13/18 0935    Past Medical/Surgical History:  Past Medical History:   Diagnosis Date   • Anemia    • Arthritis     osteo/hips and back   • At risk for falls    • Chronic back pain    • Dental disorder     lower denture   • Pain 12/04/2018    \"ALL OVER\", 10/10   • Urinary incontinence     " "using pads     Past Surgical History:   Procedure Laterality Date   • CERVICAL FUSION POSTERIOR  12/7/2018    Procedure: CERVICAL FUSION POSTERIOR- STAGE #2 C3-5 AND C3-T1;  Surgeon: Emre Mendoza III, M.D.;  Location: SURGERY [facility];  Service: Neurosurgery   • CERVICAL LAMINECTOMY POSTERIOR  12/7/2018    Procedure: CERVICAL LAMINECTOMY POSTERIOR C2-T1;  Surgeon: Emre Mendoza III, M.D.;  Location: SURGERY [facility];  Service: Neurosurgery   • CERVICAL DISK AND FUSION ANTERIOR  12/5/2018    Procedure: CERVICAL DISK AND FUSION ANTERIOR-STAGE #1 C4-5;  Surgeon: Emre Mendoza III, M.D.;  Location: SURGERY [facility];  Service: Neurosurgery   • CORPECTOMY  12/5/2018    Procedure: CORPECTOMY;  Surgeon: Emre Mendoza III, M.D.;  Location: SURGERY [facility];  Service: Neurosurgery   • HIP ARTHROPLASTY TOTAL Right 3/20/2018    Procedure: HIP ARTHROPLASTY TOTAL;  Surgeon: Bryon Levine M.D.;  Location: SURGERY [facility];  Service: Orthopedics   • KNEE ARTHROPLASTY TOTAL Right 10/20/2015    Procedure: KNEE ARTHROPLASTY TOTAL;  Surgeon: Bryon Levine M.D.;  Location: SURGERY [facility];  Service:    • APPENDECTOMY LAPAROSCOPIC  3/21/2013    Performed by Dali Queen M.D. at [facility]   • DEBRIDEMENT  5/17/2012    Performed by BRADLEY DYKES at [facility]   • PLASTIC SURGERY  2004    excess skin on arms and legs removed   • MAMMOPLASTY AUGMENTATION Bilateral 2004    breast implants and \"tummy tuck\"   • GASTRIC BYPASS LAPAROSCOPIC  2002    x 2   • ABDOMINAL EXPLORATION         Social History:  Social History     Social History   • Marital status: Single     Spouse name: N/A   • Number of children: N/A   • Years of education: N/A     Occupational History   • Not on file.     Social History Main Topics   • Smoking status: Never Smoker   • Smokeless tobacco: Never Used   • Alcohol use Yes      Comment: 2/week   • Drug use: No   • Sexual activity: No "     Other Topics Concern   • Not on file     Social History Narrative    ** Merged History Encounter **            Family History  Family History   Problem Relation Age of Onset   • Diabetes Mother    • Heart Disease Mother        Physical Examination:   Vitals:    12/12/18 1317 12/12/18 1908 12/13/18 0700 12/13/18 1400   BP: 110/78 104/72 117/74 108/74   Pulse: (!) 108 70 (!) 104 94   Resp: 18 18 18 18   Temp: 36.6 °C (97.8 °F) 36.6 °C (97.8 °F) 36.4 °C (97.6 °F) 36.4 °C (97.5 °F)   TempSrc: Oral Oral Oral Oral   SpO2: 96%      Weight:       Height:           Physical Exam   Constitutional: She is oriented to person, place, and time. No distress.   HENT:   Head: Normocephalic and atraumatic.   Right Ear: External ear normal.   Left Ear: External ear normal.   Eyes: Conjunctivae and EOM are normal. Right eye exhibits no discharge. Left eye exhibits no discharge.   Neck:   C-collar   Cardiovascular: Normal rate and regular rhythm.    Pulmonary/Chest: Effort normal and breath sounds normal. No stridor. No respiratory distress. She has no wheezes.   Abdominal: Soft. She exhibits no distension. There is no tenderness. There is no rebound and no guarding.   Musculoskeletal: She exhibits edema.   3+ edema LLE, 2+ RLE   Neurological: She is alert and oriented to person, place, and time.   Skin: Skin is warm and dry. She is not diaphoretic.   Vitals reviewed.      Laboratory Data:  Recent Labs      12/12/18   0600   WBC  8.4   RBC  3.83*   HEMOGLOBIN  9.8*   HEMATOCRIT  29.8*   MCV  77.8*   MCH  25.6*   MCHC  32.9*   RDW  50.1*   PLATELETCT  338   MPV  9.8     Recent Labs      12/12/18   0600   SODIUM  138   POTASSIUM  3.7   CHLORIDE  101   CO2  31   GLUCOSE  100*   BUN  10   CREATININE  0.53   CALCIUM  9.0       Imaging:  EC-ECHOCARDIOGRAM COMPLETE W/O CONT   Final Result          Impressions/Recommendations:  Essential hypertension  Presently controlled with Lasix  Continue to monitor BP trends    Cervical myelopathy  (HCC)  S/P cervical laminectomy and fusion  Cervical collar    Edema  Request Echocardiogram, consider Venous Duplex  Pt reports that at home she uses SCD's  Continue to diurese w/ Lasix    Vitamin D deficiency  Vit D level 14  On supplementation    History of gastric bypass  Monitor electrolytes    Rash  Left buttock vesicular lesions concerning for Herpes Zoster vs Herpes Simplex  Will start empiric Valtrex    Anemia  Will check Fe Panel w/ next draw given microcytosis    Full Code    Discussed with Dr. Harper.  Thank you for the opportunity to assist in this patient's care.  We will continue to follow along with you.

## 2018-12-12 NOTE — IDT DISCHARGE PLANNING
CASE MANAGEMENT INITIAL ASSESSMENT    Admit Date:  12/11/2018     I met with patient to discuss role of case management / discharge planning / team conference.  Patient is a  53 y.o. female transferred from [facility] ([place]).  She is alert and able to provide information.  Her admitting physician will be Dr. Harvey.  I am assisting patient with completion of leave paperwork and insurance policies.    Diagnosis: 04.120 Quadriplegia, Unspecified  Cervical myelopathy (HCC)    Co-morbidities:   Patient Active Problem List    Diagnosis Date Noted   • Mild intermittent asthma without complication 04/25/2012     Priority: High   • Cervical myelopathy (HCC) 12/11/2018   • Left hip pain 09/18/2018   • Encounter for work capability assessment- FMLA PAPERWORK 08/29/2018   • DDD (degenerative disc disease), cervical- [facility] [place]- dr brennan; needs laminectomy 12/5/2018 dr brennan 08/09/2018   • Primary osteoarthritis of left hip- dr nowak; left THR tbd feb, 2019 06/07/2018   • Obesity (BMI 30-39.9) 06/07/2018   • DDD (degenerative disc disease), lumbar 04/25/2018   • Uncomplicated opioid dependence (CMS-[facility])- kylah adkins 01/31/2018   • Essential hypertension 01/31/2018   • Venous insufficiency 10/03/2017   • Colon cancer screening- needs 08/09/2017   • Chronic bilateral low back pain with bilateral sciatica- pain mgt 07/26/2017   • Vitamin B 12 deficiency 06/02/2016   • Fibroids 08/13/2013     Prior Living Situation:  Housing / Facility: 1 Story Apartment / Condo  Lives with - Patient's Self Care Capacity: Alone and Able to Care For Self    Prior Level of Function:  Medication Management: Independent  Finances: Independent  Home Management: Requires Assist  Shopping: Requires Assist  Prior Level Of Mobility: Unable to Determine At This Time  Driving / Transportation: Relatives / Others Provide Transportation    Support Systems:  Primary : Significant other is Berto Ruiz at [phone]  Other support systems: son  Gaudencio eKys lives in [place] at [phone]     Previous Services Utilized:   Equipment Owned: Front-Wheel Walker, Single Point Cane, Wheelchair, Tub Transfer Bench  Prior Services: Intermittent Physical Support for ADL Per Family    Other Information:  Occupation (Pre-Hospital Vocational): Employed Full Time  Primary Payor Source: Private Insurance  Primary Care Practitioner : Micky Rubin  Other MDs: Dr. Mendoza for neurosurgery     Additional Case Management Questions:  Have you ever received case management services for yourself or a family member? yes    Do you feel you have and an understanding of what services  provide? We reviewed this and provided introductory letter.    Do you have any additional questions regarding case management?     Patient requested assistance with leave paperwork and we will work on this.      CASE MANAGEMENT PLAN OF CARE   Individualized Goals:   1. Patient would like assistance with leave paperwork and insurance policies.  2. I will confirm level of support for transition home.    Barriers:   1. Lives alone  2. No local family for support  3.  Patient / Family Goal:  Patient / Family Goal: Patient is hoping to improve function and return home with support from friends.  She has been unable to work since 11/21/18.  She indicates that she also needs a hip replacement after recovery from this surgery.  She has provided leave paperwork and I will assist with completion of physician statement.  I have also drafted a letter for her with regard to her [employer] policies.  She will likely need home health initially.  I will follow to assist.     Plan:  1. Continue to follow patient through hospitalization and provide discharge planning in collaboration with patient, family, physicians and ancillary services.     2. Utilize community resources to ensure a safe discharge.

## 2018-12-12 NOTE — PROGRESS NOTES
Received shift report and assumed care of patient.  Patient awake, calm and stable, currently positioned in bed for comfort and safety; call light within reach.  Denies pain or discomfort at this time.  Will continue to monitor.

## 2018-12-12 NOTE — PROGRESS NOTES
1915 received report from Celia PHAM and assume patient care. Pt calm and stable. Denies any pain at this time. No s/sx of distress. Safety precautions in place. Call light within reach. Will continue to monitor.

## 2018-12-12 NOTE — THERAPY
Physical Therapy Initial Plan of Care Note    1) Assessment: The patient is a 53 y.o. female with a past medical history of HTN, Edema, Hypokalemia, Anemia, and chronic pain including bilateral hips, knees and back who was admitted on 12/5/18 for scheduled cervical intervention. Pt admited with quadriplegia C1-C4 incomplete. She underwent a ACDF C3-6 with C4 and C5 corpectomy and cage on 12/5/18 and C3-C5 laminectomies with C3-T1 posterior fusion on 12/7/18.  Additional factors influencing patient status / progress (ie: cognitive factors, co-morbidities, social support, etc): Pt presents to PT with impaired balance, significant L hip pain, impaired endurance and impaired knowledge of precautions which prevents the pt from a safe d/c home at this time. Pt would benefit from skilled PT services [facility] [place] [place] [facility] 3 steps to enter..  Long term and short term goals have been discussed with patient and they are in agreement.  2) Plan: Recommend Physical Therapy  minutes per day 5-7 days per week for 4-6 weeks for the following treatments:  PT Group Therapy, PT E Stim Attended, PT Gait Training, PT Therapeutic Exercises, PT Neuro Re-Ed/Balance, PT Therapeutic Activity, PT Manual Therapy and PT Evaluation.  3) Goals:  Please refer to care plan for goals.

## 2018-12-13 ENCOUNTER — TELEPHONE (OUTPATIENT)
Dept: MEDICAL GROUP | Age: 53
End: 2018-12-13

## 2018-12-13 ENCOUNTER — APPOINTMENT (OUTPATIENT)
Dept: CARDIOLOGY | Facility: MEDICAL CENTER | Age: 53
DRG: 052 | End: 2018-12-13
Attending: HOSPITALIST
Payer: COMMERCIAL

## 2018-12-13 PROBLEM — D64.9 ANEMIA: Status: ACTIVE | Noted: 2018-12-13

## 2018-12-13 PROBLEM — E55.9 VITAMIN D DEFICIENCY: Status: ACTIVE | Noted: 2018-12-13

## 2018-12-13 PROBLEM — R21 RASH: Status: ACTIVE | Noted: 2018-12-13

## 2018-12-13 PROBLEM — R60.9 EDEMA: Status: ACTIVE | Noted: 2018-12-13

## 2018-12-13 LAB
EST. AVERAGE GLUCOSE BLD GHB EST-MCNC: 123 MG/DL
HBA1C MFR BLD: 5.9 % (ref 0–5.6)
LV EJECT FRACT  99904: 60
LV EJECT FRACT MOD 2C 99903: 64.58
LV EJECT FRACT MOD 4C 99902: 52.63
LV EJECT FRACT MOD BP 99901: 58.72

## 2018-12-13 PROCEDURE — A9270 NON-COVERED ITEM OR SERVICE: HCPCS | Performed by: PHYSICAL MEDICINE & REHABILITATION

## 2018-12-13 PROCEDURE — 700102 HCHG RX REV CODE 250 W/ 637 OVERRIDE(OP): Performed by: PHYSICAL MEDICINE & REHABILITATION

## 2018-12-13 PROCEDURE — 700112 HCHG RX REV CODE 229: Performed by: PHYSICAL MEDICINE & REHABILITATION

## 2018-12-13 PROCEDURE — 97535 SELF CARE MNGMENT TRAINING: CPT

## 2018-12-13 PROCEDURE — 97530 THERAPEUTIC ACTIVITIES: CPT

## 2018-12-13 PROCEDURE — 700111 HCHG RX REV CODE 636 W/ 250 OVERRIDE (IP): Performed by: PHYSICAL MEDICINE & REHABILITATION

## 2018-12-13 PROCEDURE — 99232 SBSQ HOSP IP/OBS MODERATE 35: CPT | Performed by: PHYSICAL MEDICINE & REHABILITATION

## 2018-12-13 PROCEDURE — 700101 HCHG RX REV CODE 250: Performed by: PHYSICAL MEDICINE & REHABILITATION

## 2018-12-13 PROCEDURE — 97110 THERAPEUTIC EXERCISES: CPT

## 2018-12-13 PROCEDURE — 93306 TTE W/DOPPLER COMPLETE: CPT

## 2018-12-13 PROCEDURE — 93306 TTE W/DOPPLER COMPLETE: CPT | Mod: 26 | Performed by: INTERNAL MEDICINE

## 2018-12-13 PROCEDURE — 700102 HCHG RX REV CODE 250 W/ 637 OVERRIDE(OP): Performed by: HOSPITALIST

## 2018-12-13 PROCEDURE — 97116 GAIT TRAINING THERAPY: CPT

## 2018-12-13 PROCEDURE — 99232 SBSQ HOSP IP/OBS MODERATE 35: CPT | Performed by: HOSPITALIST

## 2018-12-13 PROCEDURE — 770010 HCHG ROOM/CARE - REHAB SEMI PRIVAT*

## 2018-12-13 PROCEDURE — A9270 NON-COVERED ITEM OR SERVICE: HCPCS | Performed by: HOSPITALIST

## 2018-12-13 RX ADMIN — ACETAMINOPHEN 1000 MG: 500 TABLET, FILM COATED ORAL at 00:45

## 2018-12-13 RX ADMIN — OXYCODONE HYDROCHLORIDE 10 MG: 10 TABLET ORAL at 21:49

## 2018-12-13 RX ADMIN — Medication 60 ML: at 21:00

## 2018-12-13 RX ADMIN — OXYCODONE HYDROCHLORIDE 10 MG: 10 TABLET ORAL at 05:59

## 2018-12-13 RX ADMIN — GABAPENTIN 300 MG: 300 CAPSULE ORAL at 10:08

## 2018-12-13 RX ADMIN — MORPHINE SULFATE 30 MG: 30 TABLET, EXTENDED RELEASE ORAL at 21:22

## 2018-12-13 RX ADMIN — VALACYCLOVIR HYDROCHLORIDE 1000 MG: 500 TABLET, FILM COATED ORAL at 16:35

## 2018-12-13 RX ADMIN — ACETAMINOPHEN 1000 MG: 500 TABLET, FILM COATED ORAL at 12:18

## 2018-12-13 RX ADMIN — DOCUSATE SODIUM 100 MG: 100 CAPSULE, LIQUID FILLED ORAL at 10:08

## 2018-12-13 RX ADMIN — CYANOCOBALAMIN TAB 1000 MCG 1000 MCG: 1000 TAB at 09:35

## 2018-12-13 RX ADMIN — CALCIUM CARBONATE (ANTACID) CHEW TAB 500 MG 500 MG: 500 CHEW TAB at 09:35

## 2018-12-13 RX ADMIN — SENNOSIDES AND DOCUSATE SODIUM 1 TABLET: 8.6; 5 TABLET ORAL at 09:35

## 2018-12-13 RX ADMIN — ACETAMINOPHEN 1000 MG: 500 TABLET, FILM COATED ORAL at 06:00

## 2018-12-13 RX ADMIN — VITAMIN D, TAB 1000IU (100/BT) 5000 UNITS: 25 TAB at 10:08

## 2018-12-13 RX ADMIN — ENOXAPARIN SODIUM 40 MG: 100 INJECTION SUBCUTANEOUS at 10:08

## 2018-12-13 RX ADMIN — MORPHINE SULFATE 30 MG: 30 TABLET, EXTENDED RELEASE ORAL at 09:35

## 2018-12-13 RX ADMIN — GABAPENTIN 300 MG: 300 CAPSULE ORAL at 16:32

## 2018-12-13 RX ADMIN — POTASSIUM CHLORIDE 20 MEQ: 1500 TABLET, EXTENDED RELEASE ORAL at 09:35

## 2018-12-13 RX ADMIN — METHOCARBAMOL 1000 MG: 500 TABLET ORAL at 09:35

## 2018-12-13 RX ADMIN — METHOCARBAMOL 1000 MG: 500 TABLET ORAL at 12:18

## 2018-12-13 RX ADMIN — METHOCARBAMOL 1000 MG: 500 TABLET ORAL at 21:22

## 2018-12-13 RX ADMIN — METHOCARBAMOL 1000 MG: 500 TABLET ORAL at 16:32

## 2018-12-13 RX ADMIN — SENNOSIDES AND DOCUSATE SODIUM 1 TABLET: 8.6; 5 TABLET ORAL at 21:23

## 2018-12-13 RX ADMIN — OXYCODONE HYDROCHLORIDE 10 MG: 10 TABLET ORAL at 16:32

## 2018-12-13 RX ADMIN — CALCIUM CARBONATE (ANTACID) CHEW TAB 500 MG 500 MG: 500 CHEW TAB at 21:18

## 2018-12-13 RX ADMIN — OXYCODONE HYDROCHLORIDE 10 MG: 10 TABLET ORAL at 09:34

## 2018-12-13 RX ADMIN — OMEPRAZOLE 20 MG: 20 CAPSULE, DELAYED RELEASE ORAL at 09:35

## 2018-12-13 RX ADMIN — GABAPENTIN 300 MG: 300 CAPSULE ORAL at 21:18

## 2018-12-13 RX ADMIN — FUROSEMIDE 40 MG: 40 TABLET ORAL at 09:35

## 2018-12-13 RX ADMIN — VALACYCLOVIR HYDROCHLORIDE 1000 MG: 500 TABLET, FILM COATED ORAL at 09:35

## 2018-12-13 RX ADMIN — NEOMYCIN SULFATE, POLYMYXIN B SULFATE, HYDROCORTISONE 4 DROP: 3.5; 10000; 1 SOLUTION/ DROPS AURICULAR (OTIC) at 10:08

## 2018-12-13 RX ADMIN — VALACYCLOVIR HYDROCHLORIDE 1000 MG: 500 TABLET, FILM COATED ORAL at 21:22

## 2018-12-13 RX ADMIN — ACETAMINOPHEN 1000 MG: 500 TABLET, FILM COATED ORAL at 18:16

## 2018-12-13 RX ADMIN — DOCUSATE SODIUM 100 MG: 100 CAPSULE, LIQUID FILLED ORAL at 21:22

## 2018-12-13 RX ADMIN — ALPRAZOLAM 0.5 MG: 0.5 TABLET ORAL at 21:22

## 2018-12-13 RX ADMIN — Medication 60 ML: at 10:20

## 2018-12-13 ASSESSMENT — PAIN SCALES - GENERAL
PAINLEVEL_OUTOF10: 10
PAINLEVEL_OUTOF10: 9
PAINLEVEL_OUTOF10: 10

## 2018-12-13 ASSESSMENT — ENCOUNTER SYMPTOMS
EYES NEGATIVE: 1
PALPITATIONS: 0
FEVER: 0
NECK PAIN: 1
FOCAL WEAKNESS: 1
SHORTNESS OF BREATH: 0
CHILLS: 0
NAUSEA: 0
VOMITING: 0
COUGH: 0
ABDOMINAL PAIN: 0

## 2018-12-13 ASSESSMENT — GAIT ASSESSMENTS
ASSISTIVE DEVICE: PARALLEL BARS
ASSISTIVE DEVICE: PARALLEL BARS
GAIT LEVEL OF ASSIST: MINIMAL ASSIST
DEVIATION: ANTALGIC;STEP TO;DECREASED BASE OF SUPPORT;BRADYKINETIC;SHUFFLED GAIT;DECREASED HEEL STRIKE;DECREASED TOE OFF
DISTANCE (FEET): 8
DEVIATION: ANTALGIC;STEP TO;DECREASED BASE OF SUPPORT;BRADYKINETIC;DECREASED HEEL STRIKE;DECREASED TOE OFF
DISTANCE (FEET): 2
GAIT LEVEL OF ASSIST: MINIMAL ASSIST

## 2018-12-13 NOTE — PROGRESS NOTES
[facility] Medicine Daily Progress Note    Date of Service  12/13/2018    Chief Complaint  Edema    Interval Problem Update  Leg swelling a little better today.  No CP or SOB.    Review of Systems  Review of Systems   Constitutional: Negative for chills and fever.   HENT: Negative.    Eyes: Negative.    Respiratory: Negative for cough and shortness of breath.    Cardiovascular: Negative for chest pain and palpitations.   Gastrointestinal: Negative for abdominal pain, nausea and vomiting.   Musculoskeletal: Positive for neck pain.        Wound pain   Skin: Positive for rash.   Neurological: Positive for focal weakness.        Physical Exam  Temp:  [36.4 °C (97.5 °F)-36.4 °C (97.6 °F)] 36.4 °C (97.5 °F)  Pulse:  [] 94  Resp:  [18] 18  BP: (108-117)/(74) 108/74    Physical Exam   Constitutional: She is oriented to person, place, and time. No distress.   HENT:   Head: Normocephalic and atraumatic.   Right Ear: External ear normal.   Left Ear: External ear normal.   Eyes: Conjunctivae and EOM are normal. Right eye exhibits no discharge. Left eye exhibits no discharge.   Neck:   C-collar, incision C/D/I w/ intact staples per wound photos   Cardiovascular: Normal rate and regular rhythm.    Pulmonary/Chest: Effort normal and breath sounds normal. No stridor. No respiratory distress. She has no wheezes.   Abdominal: Soft. Bowel sounds are normal. She exhibits no distension. There is no tenderness. There is no rebound and no guarding.   Musculoskeletal: She exhibits edema.   3+ edema LLE, 2+ RLE   Neurological: She is alert and oriented to person, place, and time.   Skin: Skin is warm and dry. She is not diaphoretic.   Left buttock w/ early crusting of prior vesicular lesions   Vitals reviewed.      Fluids  No intake or output data in the 24 hours ending 12/13/18 1908    Laboratory  Recent Labs      12/12/18   0600   WBC  8.4   RBC  3.83*   HEMOGLOBIN  9.8*   HEMATOCRIT  29.8*   MCV  77.8*   MCH  25.6*   MCHC  32.9*    RDW  50.1*   PLATELETCT  338   MPV  9.8     Recent Labs      12/12/18   0600   SODIUM  138   POTASSIUM  3.7   CHLORIDE  101   CO2  31   GLUCOSE  100*   BUN  10   CREATININE  0.53   CALCIUM  9.0                   Imaging  EC-ECHOCARDIOGRAM COMPLETE W/O CONT   Final Result           Assessment/Plan  * Cervical myelopathy (HCC)   Assessment & Plan    S/P cervical laminectomy and fusion  Cervical collar     Anemia   Assessment & Plan    Will check Fe Panel w/ next draw given microcytosis     Rash   Assessment & Plan    Left buttock vesicular lesions concerning for Herpes Zoster vs Herpes Simplex  Continue 7 day course of Valtrex     Vitamin D deficiency   Assessment & Plan    Vit D level 14  On supplementation     Edema   Assessment & Plan    Echo shows EF 60%  Consider Venous Duplex  Pt reports that at home she uses SCD's  Continue to diurese w/ Lasix     Essential hypertension- (present on admission)   Assessment & Plan    Presently controlled with Lasix  Continue to monitor BP trends     History of gastric bypass- (present on admission)   Assessment & Plan    Monitor electrolytes       Full Code

## 2018-12-13 NOTE — TELEPHONE ENCOUNTER
Phone Number Called: [phone] (home)       Message: unable to LVM it has not been set up    Left Message for patient to call back: N\A

## 2018-12-13 NOTE — PROGRESS NOTES
-----------Non-Face-to-Face------------  Prolonged non-face-to-face time was spent on 12/10/18 from 1315 to 1332 and 1505 to 1525 by this reviewer.  This time included medical chart review, medication review, and decision making for the appropriateness of transfer to inpatient rehabilitation.  Please see PM&R Chart Review Consult from 12/9/18 by Dr. Lee for detailed summation of the medical chart and the reasoning for current recommendations. Reviewed Dr. Lee's recommendation as well as provided recommendations on criteria to come to rehab including must discontinue IV opiates. In addition to the above, time was spent coordinating care with case management as well as transitional care coordination team in the acceptance and transfer of the patient to the inpatient rehabilitation facility setting.

## 2018-12-13 NOTE — PROGRESS NOTES
"Rehab Progress Note     Encounter Date: 12/12/2018    CC: Cervical Myelopathy C4 AIS C    Interval Events (Subjective)  Patient seen in bed. She reports pain is somewhat controlled. She reports she was able to tolerate some therapy today. She reports she has a letter she needs signed for catastrophic disability, discussed that will sign it once filled out by CM.  Patient reports otherwise that she continues to have numbness into bilateral hands. She reports her right arm feels heavier today, discussed most likely fatigue after therapy. Discussed hospitalist consult for edema and rash.     Objective:  VITAL SIGNS: /78   Pulse (!) 108   Temp 36.6 °C (97.8 °F) (Oral)   Resp 18   Ht 1.6 m (5' 3\")   Wt 84.4 kg (186 lb)   LMP  (LMP Unknown)   SpO2 96%   Breastfeeding? No   BMI 32.95 kg/m²   Gen: NAD  Psych: Mood and affect appropriate  CV: RR mild tachycardia, no edema  Resp: CTAB, shallow breathing, no upper airway sounds  Abd: NTND  Neuro: AOx4, 3/5 LUE, 2/5 RUE    Recent Results (from the past 72 hour(s))   CBC WITH DIFFERENTIAL    Collection Time: 12/10/18  2:45 AM   Result Value Ref Range    WBC 10.4 4.8 - 10.8 K/uL    RBC 3.93 (L) 4.20 - 5.40 M/uL    Hemoglobin 10.1 (L) 12.0 - 16.0 g/dL    Hematocrit 30.4 (L) 37.0 - 47.0 %    MCV 77.4 (L) 81.4 - 97.8 fL    MCH 25.7 (L) 27.0 - 33.0 pg    MCHC 33.2 (L) 33.6 - 35.0 g/dL    RDW 49.9 35.9 - 50.0 fL    Platelet Count 329 164 - 446 K/uL    MPV 9.5 9.0 - 12.9 fL    Neutrophils-Polys 57.30 44.00 - 72.00 %    Lymphocytes 29.60 22.00 - 41.00 %    Monocytes 10.00 0.00 - 13.40 %    Eosinophils 2.20 0.00 - 6.90 %    Basophils 0.50 0.00 - 1.80 %    Immature Granulocytes 0.40 0.00 - 0.90 %    Nucleated RBC 0.00 /100 WBC    Neutrophils (Absolute) 5.94 2.00 - 7.15 K/uL    Lymphs (Absolute) 3.06 1.00 - 4.80 K/uL    Monos (Absolute) 1.03 (H) 0.00 - 0.85 K/uL    Eos (Absolute) 0.23 0.00 - 0.51 K/uL    Baso (Absolute) 0.05 0.00 - 0.12 K/uL    Immature Granulocytes (abs) " 0.04 0.00 - 0.11 K/uL    NRBC (Absolute) 0.00 K/uL   BASIC METABOLIC PANEL    Collection Time: 12/10/18  2:45 AM   Result Value Ref Range    Sodium 137 135 - 145 mmol/L    Potassium 3.6 3.6 - 5.5 mmol/L    Chloride 101 96 - 112 mmol/L    Co2 30 20 - 33 mmol/L    Glucose 111 (H) 65 - 99 mg/dL    Bun 8 8 - 22 mg/dL    Creatinine 0.55 0.50 - 1.40 mg/dL    Calcium 8.7 8.5 - 10.5 mg/dL    Anion Gap 6.0 0.0 - 11.9   ESTIMATED GFR    Collection Time: 12/10/18  2:45 AM   Result Value Ref Range    GFR If African American >60 >60 mL/min/1.73 m 2    GFR If Non African American >60 >60 mL/min/1.73 m 2   CBC with Differential    Collection Time: 12/12/18  6:00 AM   Result Value Ref Range    WBC 8.4 4.8 - 10.8 K/uL    RBC 3.83 (L) 4.20 - 5.40 M/uL    Hemoglobin 9.8 (L) 12.0 - 16.0 g/dL    Hematocrit 29.8 (L) 37.0 - 47.0 %    MCV 77.8 (L) 81.4 - 97.8 fL    MCH 25.6 (L) 27.0 - 33.0 pg    MCHC 32.9 (L) 33.6 - 35.0 g/dL    RDW 50.1 (H) 35.9 - 50.0 fL    Platelet Count 338 164 - 446 K/uL    MPV 9.8 9.0 - 12.9 fL    Neutrophils-Polys 59.00 44.00 - 72.00 %    Lymphocytes 24.00 22.00 - 41.00 %    Monocytes 9.10 0.00 - 13.40 %    Eosinophils 6.90 0.00 - 6.90 %    Basophils 0.60 0.00 - 1.80 %    Immature Granulocytes 0.40 0.00 - 0.90 %    Nucleated RBC 0.00 /100 WBC    Neutrophils (Absolute) 4.93 2.00 - 7.15 K/uL    Lymphs (Absolute) 2.01 1.00 - 4.80 K/uL    Monos (Absolute) 0.76 0.00 - 0.85 K/uL    Eos (Absolute) 0.58 (H) 0.00 - 0.51 K/uL    Baso (Absolute) 0.05 0.00 - 0.12 K/uL    Immature Granulocytes (abs) 0.03 0.00 - 0.11 K/uL    NRBC (Absolute) 0.00 K/uL   Comp Metabolic Panel (CMP)    Collection Time: 12/12/18  6:00 AM   Result Value Ref Range    Sodium 138 135 - 145 mmol/L    Potassium 3.7 3.6 - 5.5 mmol/L    Chloride 101 96 - 112 mmol/L    Co2 31 20 - 33 mmol/L    Anion Gap 6.0 0.0 - 11.9    Glucose 100 (H) 65 - 99 mg/dL    Bun 10 8 - 22 mg/dL    Creatinine 0.53 0.50 - 1.40 mg/dL    Calcium 9.0 8.5 - 10.5 mg/dL    AST(SGOT) 16 12  - 45 U/L    ALT(SGPT) 10 2 - 50 U/L    Alkaline Phosphatase 82 30 - 99 U/L    Total Bilirubin 0.3 0.1 - 1.5 mg/dL    Albumin 2.9 (L) 3.2 - 4.9 g/dL    Total Protein 6.0 6.0 - 8.2 g/dL    Globulin 3.1 1.9 - 3.5 g/dL    A-G Ratio 0.9 g/dL   TSH with Reflex to FT4    Collection Time: 12/12/18  6:00 AM   Result Value Ref Range    TSH 2.960 0.380 - 5.330 uIU/mL   Vitamin D, 25-hydroxy (blood)    Collection Time: 12/12/18  6:00 AM   Result Value Ref Range    25-Hydroxy   Vitamin D 25 14 (L) 30 - 100 ng/mL   ESTIMATED GFR    Collection Time: 12/12/18  6:00 AM   Result Value Ref Range    GFR If African American >60 >60 mL/min/1.73 m 2    GFR If Non African American >60 >60 mL/min/1.73 m 2       Current Facility-Administered Medications   Medication Frequency   • bisacodyl (DULCOLAX) suppository 10 mg DAILY   • polyethylene glycol/lytes (MIRALAX) PACKET 1 Packet QDAY PRN   • magnesium hydroxide (MILK OF MAGNESIA) suspension 30 mL QDAY PRN   • bisacodyl (DULCOLAX) suppository 10 mg QDAY PRN   • Respiratory Care per Protocol Continuous RT   • Pharmacy Consult Request ...Pain Management Review 1 Each PRN   • acetaminophen (TYLENOL) tablet 1,000 mg Q6HRS   • oxyCODONE immediate-release (ROXICODONE) tablet 5 mg Q3HRS PRN   • oxyCODONE immediate release (ROXICODONE) tablet 10 mg Q3HRS PRN   • hydrALAZINE (APRESOLINE) tablet 25 mg Q8HRS PRN   • artificial tears 1.4 % ophthalmic solution 1 Drop PRN   • benzocaine-menthol (CEPACOL) lozenge 1 Lozenge Q2HRS PRN   • mag hydrox-al hydrox-simeth (MAALOX PLUS ES or MYLANTA DS) suspension 20 mL Q2HRS PRN   • ondansetron (ZOFRAN ODT) dispertab 4 mg 4X/DAY PRN    Or   • ondansetron (ZOFRAN) syringe/vial injection 4 mg 4X/DAY PRN   • traZODone (DESYREL) tablet 50 mg QHS PRN   • sodium chloride (OCEAN) 0.65 % nasal spray 2 Spray PRN   • enoxaparin (LOVENOX) inj 40 mg DAILY   • ALPRAZolam (XANAX) tablet 0.5 mg TID PRN   • calcium carbonate (TUMS) chewable tab 500 mg BID   • docusate sodium  (COLACE) capsule 100 mg BID   • promethazine (PHENERGAN) tablet 12.5-25 mg Q4HRS PRN   • vitamin D (cholecalciferol) tablet 5,000 Units DAILY   • cyanocobalamin (VITAMIN B12) tablet 1,000 mcg DAILY   • furosemide (LASIX) tablet 40 mg DAILY   • gabapentin (NEURONTIN) capsule 300 mg TID   • methocarbamol (ROBAXIN) tablet 1,000 mg 4X/DAY   • morphine ER (MS CONTIN) tablet 30 mg Q12HRS   • neomycin sulf/polymyx B sulf/HC soln (CORTISPORIN HC SOL) 3.5-12487-1 otic solution 4 Drop 4X/DAY   • potassium chloride SA (Kdur) tablet 20 mEq DAILY   • normal saline PF 60 mL Q12HRS   • senna-docusate (PERICOLACE or SENOKOT S) 8.6-50 MG per tablet 1 Tab BID   • omeprazole (PRILOSEC) capsule 20 mg DAILY       Orders Placed This Encounter   Procedures   • Diet Order Regular (all meats chopped, sandwiches cut in 1/4)     Standing Status:   Standing     Number of Occurrences:   1     Order Specific Question:   Diet:     Answer:   Regular [1]     Comments:   all meats chopped, sandwiches cut in 1/4     Order Specific Question:   Consistency/Fluid modifications:     Answer:   Thin Liquids [3]       Assessment:  Active Hospital Problems    Diagnosis   • *Cervical myelopathy (HCC)   • Left hip pain   • Obesity (BMI 30-39.9)   • Primary osteoarthritis of left hip- dr nowak; left THR tbd feb, 2019   • Essential hypertension   • Vitamin B 12 deficiency       Medical Decision Making and Plan:  Cervical Myelopathy/C4 AIS C SCI - Patient is s/p ACDF C3-6 with C4 and C5 corpectomy and cage on 12/5/18 and C3-C5 laminectomies with C3-T1 posterior fusion on 12/7/18 with Dr. Mendoza.  Patient most likely C4 AIS C as prior neurogenic bladder and numbness.    -C collar off for showers and meals per NSG. Will need follow-up with Dr. Mendoza  -PT and OT for mobility and ADLs. Patient previously at wheelchair level due to L hip pain.     Pain control - Patient at baseline on opiates with pain specialist.  Patient currently on Gabapentin 300 mg TID, Tylenol  1000 mg Q6, Robaxin 1000 mg QID, MS Contin 30 mg q12h and Oxycodone PRN.   -Discussed will need to decrease medication usage.  Will also be required to follow-up with pain specialist at discharge. Utilized 50 mg in first 24 hours.      Dysphagia - Patient evaluated by SLP at [facility] and recommending Dysphagia 3 with thin liquids.  -SLP to evaluate - recommend regular with thins     Neurogenic bladder - Patient reports incontinence prior to surgery. She reports some sensation.  Will check PVRs and catheterize if necessary. PVRs 34 and 51     Neurogenic Bowel - Vs constipation from opiates. Continue to monitor. Added daily suppository as no BM in 3 days, will continue to monitor     Hypokalemia - Patient on 20 mg daily on transfer. On Lasix.      Chronic edema - Patient on Lasix 40 mg on transfer. Per discussion with hospitalist check TTE     Anemia - Check CBC on admission - 9.8 on admission     L buttock lesion - Appears verrucous, nursing concerned for possible shingles outbreak. Per patient not noted prior.   -Discussed with hospitalist, Herpes simplex vs HPV. Hospitalist to start acyclovir. Discontinue isolation     B12/Vit D Def - Patient on supplements on transfer.  Vitamin D 14 on admission, continue 5000 U daily       GI Ppx - Will add Prilosec on admission.     DVT Ppx - Patient on Lovenox 40 mg daily.     Total time:  35 minutes.  I spent greater than 50% of the time for patient care and coordination on this date, including unit/floor time, and face-to-face time with the patient as per assessment and plan above.  Admission labs, PVRs, constipation, and opiate discussion.  Filled out paperwork for time off work.     Erendira Harper M.D.

## 2018-12-14 ENCOUNTER — HOSPITAL ENCOUNTER (INPATIENT)
Dept: RADIOLOGY | Facility: MEDICAL CENTER | Age: 53
End: 2018-12-14
Attending: HOSPITALIST
Payer: COMMERCIAL

## 2018-12-14 DIAGNOSIS — M79.89 SWELLING OF LIMB: ICD-10-CM

## 2018-12-14 PROCEDURE — 97112 NEUROMUSCULAR REEDUCATION: CPT

## 2018-12-14 PROCEDURE — 770010 HCHG ROOM/CARE - REHAB SEMI PRIVAT*

## 2018-12-14 PROCEDURE — 700102 HCHG RX REV CODE 250 W/ 637 OVERRIDE(OP): Performed by: HOSPITALIST

## 2018-12-14 PROCEDURE — 97530 THERAPEUTIC ACTIVITIES: CPT

## 2018-12-14 PROCEDURE — 93970 EXTREMITY STUDY: CPT

## 2018-12-14 PROCEDURE — 700102 HCHG RX REV CODE 250 W/ 637 OVERRIDE(OP): Performed by: PHYSICAL MEDICINE & REHABILITATION

## 2018-12-14 PROCEDURE — 700112 HCHG RX REV CODE 229: Performed by: PHYSICAL MEDICINE & REHABILITATION

## 2018-12-14 PROCEDURE — 97535 SELF CARE MNGMENT TRAINING: CPT

## 2018-12-14 PROCEDURE — 99232 SBSQ HOSP IP/OBS MODERATE 35: CPT | Performed by: HOSPITALIST

## 2018-12-14 PROCEDURE — 700111 HCHG RX REV CODE 636 W/ 250 OVERRIDE (IP): Performed by: PHYSICAL MEDICINE & REHABILITATION

## 2018-12-14 PROCEDURE — A9270 NON-COVERED ITEM OR SERVICE: HCPCS | Performed by: PHYSICAL MEDICINE & REHABILITATION

## 2018-12-14 PROCEDURE — A9270 NON-COVERED ITEM OR SERVICE: HCPCS | Performed by: HOSPITALIST

## 2018-12-14 PROCEDURE — 700101 HCHG RX REV CODE 250: Performed by: PHYSICAL MEDICINE & REHABILITATION

## 2018-12-14 PROCEDURE — 97116 GAIT TRAINING THERAPY: CPT

## 2018-12-14 PROCEDURE — 99232 SBSQ HOSP IP/OBS MODERATE 35: CPT | Performed by: PHYSICAL MEDICINE & REHABILITATION

## 2018-12-14 RX ADMIN — CALCIUM CARBONATE (ANTACID) CHEW TAB 500 MG 500 MG: 500 CHEW TAB at 21:38

## 2018-12-14 RX ADMIN — Medication 60 ML: at 08:22

## 2018-12-14 RX ADMIN — POTASSIUM CHLORIDE 20 MEQ: 1500 TABLET, EXTENDED RELEASE ORAL at 08:20

## 2018-12-14 RX ADMIN — MORPHINE SULFATE 30 MG: 30 TABLET, EXTENDED RELEASE ORAL at 21:39

## 2018-12-14 RX ADMIN — ACETAMINOPHEN 1000 MG: 500 TABLET, FILM COATED ORAL at 17:56

## 2018-12-14 RX ADMIN — OXYCODONE HYDROCHLORIDE 10 MG: 10 TABLET ORAL at 00:24

## 2018-12-14 RX ADMIN — GABAPENTIN 300 MG: 300 CAPSULE ORAL at 15:28

## 2018-12-14 RX ADMIN — METHOCARBAMOL 1000 MG: 500 TABLET ORAL at 08:19

## 2018-12-14 RX ADMIN — ALPRAZOLAM 0.5 MG: 0.5 TABLET ORAL at 21:39

## 2018-12-14 RX ADMIN — MORPHINE SULFATE 30 MG: 30 TABLET, EXTENDED RELEASE ORAL at 08:21

## 2018-12-14 RX ADMIN — SENNOSIDES AND DOCUSATE SODIUM 1 TABLET: 8.6; 5 TABLET ORAL at 21:39

## 2018-12-14 RX ADMIN — GABAPENTIN 300 MG: 300 CAPSULE ORAL at 08:20

## 2018-12-14 RX ADMIN — Medication 60 ML: at 21:40

## 2018-12-14 RX ADMIN — VALACYCLOVIR HYDROCHLORIDE 1000 MG: 500 TABLET, FILM COATED ORAL at 08:21

## 2018-12-14 RX ADMIN — VALACYCLOVIR HYDROCHLORIDE 1000 MG: 500 TABLET, FILM COATED ORAL at 21:39

## 2018-12-14 RX ADMIN — METHOCARBAMOL 1000 MG: 500 TABLET ORAL at 21:39

## 2018-12-14 RX ADMIN — OXYCODONE HYDROCHLORIDE 10 MG: 10 TABLET ORAL at 15:29

## 2018-12-14 RX ADMIN — ENOXAPARIN SODIUM 40 MG: 100 INJECTION SUBCUTANEOUS at 08:19

## 2018-12-14 RX ADMIN — ACETAMINOPHEN 1000 MG: 500 TABLET, FILM COATED ORAL at 00:24

## 2018-12-14 RX ADMIN — VALACYCLOVIR HYDROCHLORIDE 1000 MG: 500 TABLET, FILM COATED ORAL at 15:29

## 2018-12-14 RX ADMIN — OXYCODONE HYDROCHLORIDE 10 MG: 10 TABLET ORAL at 08:21

## 2018-12-14 RX ADMIN — SENNOSIDES AND DOCUSATE SODIUM 1 TABLET: 8.6; 5 TABLET ORAL at 08:20

## 2018-12-14 RX ADMIN — OXYCODONE HYDROCHLORIDE 5 MG: 5 TABLET ORAL at 05:12

## 2018-12-14 RX ADMIN — OXYCODONE HYDROCHLORIDE 10 MG: 10 TABLET ORAL at 12:00

## 2018-12-14 RX ADMIN — CYANOCOBALAMIN TAB 1000 MCG 1000 MCG: 1000 TAB at 08:20

## 2018-12-14 RX ADMIN — METHOCARBAMOL 1000 MG: 500 TABLET ORAL at 17:56

## 2018-12-14 RX ADMIN — GABAPENTIN 300 MG: 300 CAPSULE ORAL at 21:39

## 2018-12-14 RX ADMIN — CALCIUM CARBONATE (ANTACID) CHEW TAB 500 MG 500 MG: 500 CHEW TAB at 08:19

## 2018-12-14 RX ADMIN — METHOCARBAMOL 1000 MG: 500 TABLET ORAL at 12:00

## 2018-12-14 RX ADMIN — VITAMIN D, TAB 1000IU (100/BT) 5000 UNITS: 25 TAB at 08:20

## 2018-12-14 RX ADMIN — ACETAMINOPHEN 1000 MG: 500 TABLET, FILM COATED ORAL at 12:00

## 2018-12-14 RX ADMIN — FUROSEMIDE 40 MG: 40 TABLET ORAL at 08:19

## 2018-12-14 RX ADMIN — OMEPRAZOLE 20 MG: 20 CAPSULE, DELAYED RELEASE ORAL at 08:20

## 2018-12-14 RX ADMIN — OXYCODONE HYDROCHLORIDE 10 MG: 10 TABLET ORAL at 21:39

## 2018-12-14 RX ADMIN — DOCUSATE SODIUM 100 MG: 100 CAPSULE, LIQUID FILLED ORAL at 21:39

## 2018-12-14 RX ADMIN — DOCUSATE SODIUM 100 MG: 100 CAPSULE, LIQUID FILLED ORAL at 08:19

## 2018-12-14 RX ADMIN — ACETAMINOPHEN 1000 MG: 500 TABLET, FILM COATED ORAL at 05:12

## 2018-12-14 RX ADMIN — OXYCODONE HYDROCHLORIDE 10 MG: 10 TABLET ORAL at 18:33

## 2018-12-14 ASSESSMENT — PAIN SCALES - GENERAL
PAINLEVEL_OUTOF10: ASSUMED PAIN PRESENT
PAINLEVEL_OUTOF10: 8
PAINLEVEL_OUTOF10: 10
PAINLEVEL_OUTOF10: 8
PAINLEVEL_OUTOF10: 9

## 2018-12-14 ASSESSMENT — ENCOUNTER SYMPTOMS
COUGH: 0
FEVER: 0
PALPITATIONS: 0
ABDOMINAL PAIN: 0
FOCAL WEAKNESS: 1
CHILLS: 0
NAUSEA: 0
NECK PAIN: 1
SHORTNESS OF BREATH: 0
VOMITING: 0
EYES NEGATIVE: 1

## 2018-12-14 NOTE — REHAB-CM IDT TEAM NOTE
Case Management    DC Planning  DC destination/dispostion:  Patient lives alone in a single level apartment.    DC Needs:  She has a fww, w/c, spc and tub bench.  She also has sequential compression device.  She will need home health most likely at discharge.  Her significant other is supportive but lives in [place].  She has supportive friends.  We have completed paperwork for her disability insurance and this has been faxed.    Barriers to discharge:   Lives alone.  Patient is very distracted and has poor focus.    Strengths:     Section completed by:  Briana Herzog R.N.

## 2018-12-14 NOTE — PROGRESS NOTES
Pt complaining of severe pain and wanting pain meds. Pt informed of PRN pain med schedule. Pt stated that she wanted meds now and all her muscle relaxer and MS contin. Pt educated the earliest to give 9am meds is 8am

## 2018-12-14 NOTE — ASSESSMENT & PLAN NOTE
Her echocardiogram showed preserved LVEF of 60%  Lower extremity duplex was negative for DVTs  Resume Lasix at current dose, improving LE edema overall   Difficult to monitor urine output since patient has incontinence   Blood pressure overall well controlled

## 2018-12-14 NOTE — REHAB-PHARMACY IDT TEAM NOTE
Pharmacy   Pharmacy  Antibiotics/Antifungals/Antivirals:  Medication:      Active Orders     Ordered     Ordering Provider       Wed Dec 12, 2018  5:07 PM    12/12/18 1707  valACYclovir (VALTREX) caplet 1,000 mg  3 TIMES DAILY      Mere Wade M.D.        Route:         po  Stop Date:  12/18/19  Reason: Left buttock vesicular lesions concerning for Herpes Zoster vs Herpes Simplex  Antihypertensives/Cardiac:  Medication:    Orders (72h ago through future)    Start     Ordered    12/12/18 0900  furosemide (LASIX) tablet 40 mg  DAILY      12/11/18 1132    12/11/18 1132  hydrALAZINE (APRESOLINE) tablet 25 mg  EVERY 8 HOURS PRN      12/11/18 1133        Patient Vitals for the past 24 hrs:   BP Pulse   12/14/18 1405 (!) 96/61 (!) 109   12/14/18 0647 120/87 (!) 110   12/13/18 1955 131/87 (!) 113       Anticoagulation:  Medication: Lovenox    Other key medications: A review of the medication list reveals no issues at this time.    Section completed by: Anthony Milligan [facility]

## 2018-12-14 NOTE — PROGRESS NOTES
"Rehab Progress Note     Encounter Date: 12/13/2018    CC: Cervical Myelopathy C4 AIS C    Interval Events (Subjective)  Patient seen up in gym. She is able to walk a few brief steps in the parallel bars. She reports her biggest complaint is the pain on standing or sitting in the left hip.  Patient otherwise reports she still has not brought in her SCDs, discussed will ask nursing about any SCDs on the unit. Discussed Valacyclovir for possible herpes simplex. Patient had large BM with suppository last night.     Objective:  VITAL SIGNS: /74   Pulse 94   Temp 36.4 °C (97.5 °F) (Oral)   Resp 18   Ht 1.6 m (5' 3\")   Wt 84.4 kg (186 lb)   LMP  (LMP Unknown)   SpO2 96%   Breastfeeding? No   BMI 32.95 kg/m²   Gen: NAD  Psych: Mood and affect appropriate  CV: RR mild tachycardia, no edema  Resp: CTAB, shallow breathing, no upper airway sounds  Abd: NTND  Neuro: AOx4, 3/5 LUE, 2/5 RUE  Unchanged from 12/12/18    Recent Results (from the past 72 hour(s))   CBC with Differential    Collection Time: 12/12/18  6:00 AM   Result Value Ref Range    WBC 8.4 4.8 - 10.8 K/uL    RBC 3.83 (L) 4.20 - 5.40 M/uL    Hemoglobin 9.8 (L) 12.0 - 16.0 g/dL    Hematocrit 29.8 (L) 37.0 - 47.0 %    MCV 77.8 (L) 81.4 - 97.8 fL    MCH 25.6 (L) 27.0 - 33.0 pg    MCHC 32.9 (L) 33.6 - 35.0 g/dL    RDW 50.1 (H) 35.9 - 50.0 fL    Platelet Count 338 164 - 446 K/uL    MPV 9.8 9.0 - 12.9 fL    Neutrophils-Polys 59.00 44.00 - 72.00 %    Lymphocytes 24.00 22.00 - 41.00 %    Monocytes 9.10 0.00 - 13.40 %    Eosinophils 6.90 0.00 - 6.90 %    Basophils 0.60 0.00 - 1.80 %    Immature Granulocytes 0.40 0.00 - 0.90 %    Nucleated RBC 0.00 /100 WBC    Neutrophils (Absolute) 4.93 2.00 - 7.15 K/uL    Lymphs (Absolute) 2.01 1.00 - 4.80 K/uL    Monos (Absolute) 0.76 0.00 - 0.85 K/uL    Eos (Absolute) 0.58 (H) 0.00 - 0.51 K/uL    Baso (Absolute) 0.05 0.00 - 0.12 K/uL    Immature Granulocytes (abs) 0.03 0.00 - 0.11 K/uL    NRBC (Absolute) 0.00 K/uL   Comp " Metabolic Panel (CMP)    Collection Time: 12/12/18  6:00 AM   Result Value Ref Range    Sodium 138 135 - 145 mmol/L    Potassium 3.7 3.6 - 5.5 mmol/L    Chloride 101 96 - 112 mmol/L    Co2 31 20 - 33 mmol/L    Anion Gap 6.0 0.0 - 11.9    Glucose 100 (H) 65 - 99 mg/dL    Bun 10 8 - 22 mg/dL    Creatinine 0.53 0.50 - 1.40 mg/dL    Calcium 9.0 8.5 - 10.5 mg/dL    AST(SGOT) 16 12 - 45 U/L    ALT(SGPT) 10 2 - 50 U/L    Alkaline Phosphatase 82 30 - 99 U/L    Total Bilirubin 0.3 0.1 - 1.5 mg/dL    Albumin 2.9 (L) 3.2 - 4.9 g/dL    Total Protein 6.0 6.0 - 8.2 g/dL    Globulin 3.1 1.9 - 3.5 g/dL    A-G Ratio 0.9 g/dL   HEMOGLOBIN A1C    Collection Time: 12/12/18  6:00 AM   Result Value Ref Range    Glycohemoglobin 5.9 (H) 0.0 - 5.6 %    Est Avg Glucose 123 mg/dL   TSH with Reflex to FT4    Collection Time: 12/12/18  6:00 AM   Result Value Ref Range    TSH 2.960 0.380 - 5.330 uIU/mL   Vitamin D, 25-hydroxy (blood)    Collection Time: 12/12/18  6:00 AM   Result Value Ref Range    25-Hydroxy   Vitamin D 25 14 (L) 30 - 100 ng/mL   ESTIMATED GFR    Collection Time: 12/12/18  6:00 AM   Result Value Ref Range    GFR If African American >60 >60 mL/min/1.73 m 2    GFR If Non African American >60 >60 mL/min/1.73 m 2   EC-ECHOCARDIOGRAM COMPLETE W/O CONT    Collection Time: 12/13/18  8:46 AM   Result Value Ref Range    Eject.Frac. MOD BP 58.72     Eject.Frac. MOD 4C 52.63     Eject.Frac. MOD 2C 64.58     Left Ventrical Ejection Fraction 60        Current Facility-Administered Medications   Medication Frequency   • neomycin sulf/polymyx B sulf/HC soln (CORTISPORIN HC SOL) 3.5-19600-2 otic solution 4 Drop PRN   • bisacodyl (DULCOLAX) suppository 10 mg DAILY   • polyethylene glycol/lytes (MIRALAX) PACKET 1 Packet QDAY PRN   • magnesium hydroxide (MILK OF MAGNESIA) suspension 30 mL QDAY PRN   • bisacodyl (DULCOLAX) suppository 10 mg QDAY PRN   • valACYclovir (VALTREX) caplet 1,000 mg TID   • Respiratory Care per Protocol Continuous RT   •  Pharmacy Consult Request ...Pain Management Review 1 Each PRN   • acetaminophen (TYLENOL) tablet 1,000 mg Q6HRS   • oxyCODONE immediate-release (ROXICODONE) tablet 5 mg Q3HRS PRN   • oxyCODONE immediate release (ROXICODONE) tablet 10 mg Q3HRS PRN   • hydrALAZINE (APRESOLINE) tablet 25 mg Q8HRS PRN   • artificial tears 1.4 % ophthalmic solution 1 Drop PRN   • benzocaine-menthol (CEPACOL) lozenge 1 Lozenge Q2HRS PRN   • mag hydrox-al hydrox-simeth (MAALOX PLUS ES or MYLANTA DS) suspension 20 mL Q2HRS PRN   • ondansetron (ZOFRAN ODT) dispertab 4 mg 4X/DAY PRN    Or   • ondansetron (ZOFRAN) syringe/vial injection 4 mg 4X/DAY PRN   • traZODone (DESYREL) tablet 50 mg QHS PRN   • sodium chloride (OCEAN) 0.65 % nasal spray 2 Spray PRN   • enoxaparin (LOVENOX) inj 40 mg DAILY   • ALPRAZolam (XANAX) tablet 0.5 mg TID PRN   • calcium carbonate (TUMS) chewable tab 500 mg BID   • docusate sodium (COLACE) capsule 100 mg BID   • promethazine (PHENERGAN) tablet 12.5-25 mg Q4HRS PRN   • vitamin D (cholecalciferol) tablet 5,000 Units DAILY   • cyanocobalamin (VITAMIN B12) tablet 1,000 mcg DAILY   • furosemide (LASIX) tablet 40 mg DAILY   • gabapentin (NEURONTIN) capsule 300 mg TID   • methocarbamol (ROBAXIN) tablet 1,000 mg 4X/DAY   • morphine ER (MS CONTIN) tablet 30 mg Q12HRS   • potassium chloride SA (Kdur) tablet 20 mEq DAILY   • normal saline PF 60 mL Q12HRS   • senna-docusate (PERICOLACE or SENOKOT S) 8.6-50 MG per tablet 1 Tab BID   • omeprazole (PRILOSEC) capsule 20 mg DAILY       Orders Placed This Encounter   Procedures   • Diet Order Regular (all meats chopped, sandwiches cut in 1/4)     Standing Status:   Standing     Number of Occurrences:   1     Order Specific Question:   Diet:     Answer:   Regular [1]     Comments:   all meats chopped, sandwiches cut in 1/4     Order Specific Question:   Consistency/Fluid modifications:     Answer:   Thin Liquids [3]       Assessment:  Active Hospital Problems    Diagnosis   •  *Cervical myelopathy (HCC)   • Left hip pain   • Obesity (BMI 30-39.9)   • Primary osteoarthritis of left hip- dr nowak; left THR tbd feb, 2019   • Essential hypertension   • Vitamin B 12 deficiency       Medical Decision Making and Plan:  Cervical Myelopathy/C4 AIS C SCI - Patient is s/p ACDF C3-6 with C4 and C5 corpectomy and cage on 12/5/18 and C3-C5 laminectomies with C3-T1 posterior fusion on 12/7/18 with Dr. Mendoza.  Patient most likely C4 AIS C as prior neurogenic bladder and numbness.    -C collar off for showers and meals per NSG. Will need follow-up with Dr. Mendoza  -PT and OT for mobility and ADLs. Patient previously at wheelchair level due to L hip pain.     Pain control - Patient at baseline on opiates with pain specialist.  Patient currently on Gabapentin 300 mg TID, Tylenol 1000 mg Q6, Robaxin 1000 mg QID, MS Contin 30 mg q12h and Oxycodone PRN.   -Discussed will need to decrease medication usage.  Will also be required to follow-up with pain specialist at discharge. Utilized 40 mg in last 24 hours.      Dysphagia - Patient evaluated by SLP at [facility] and recommending Dysphagia 3 with thin liquids. SLP to evaluate - recommend regular with thins. Resolved     Neurogenic bladder - Patient reports incontinence prior to surgery. She reports some sensation.  Will check PVRs and catheterize if necessary. PVRs 34 and 51     Neurogenic Bowel - Vs constipation from opiates. Continue to monitor. Added daily suppository as no BM in 3 days, will continue to monitor. Large BM after nightly suppository 12/13/18.     Hypokalemia - Patient on 20 mg daily on transfer. On Lasix.      Chronic edema - Patient on Lasix 40 mg on transfer. Per discussion with hospitalist check TTE - EF of 60%     Anemia - Check CBC on admission - 9.8 on admission     L buttock lesion - Appears verrucous, nursing concerned for possible shingles outbreak. Per patient not noted prior.   -Discussed with hospitalist, Herpes simplex vs HPV.  Hospitalist to start valacyclovir. Discontinue isolation     B12/Vit D Def - Patient on supplements on transfer.  Vitamin D 14 on admission, continue 5000 U daily       GI Ppx - Will add Prilosec on admission.     DVT Ppx - Patient on Lovenox 40 mg daily.     Total time:  30 minutes.  I spent greater than 50% of the time for patient care, counseling, and coordination on this date, including unit/floor time, and face-to-face time with the patient as per interval events and assessment and plan above. Topics discussed included SCDs if available, improvement with BTP, and Echo with EF of 60%.    Erendira Harper M.D.

## 2018-12-14 NOTE — PROGRESS NOTES
"Rehab Progress Note     Encounter Date: 12/13/2018    CC: Cervical Myelopathy C4 AIS C    Interval Events (Subjective)  Patient sitting up in room. She reports she is doing well except that when they tried to wean her to 5 mg oxycodone that her pain flared up significantly. She brought in her home SCDs and will OK for patient to use them. Discussed bowel program while constipated. Discussed if having BM she can refuse suppository.     Objective:  VITAL SIGNS: /87   Pulse (!) 110   Temp 36.3 °C (97.3 °F) (Oral)   Resp 19   Ht 1.6 m (5' 3\")   Wt 84.4 kg (186 lb)   LMP  (LMP Unknown)   SpO2 93%   Breastfeeding? No   BMI 32.95 kg/m²   Gen: NAD  Psych: Mood and affect appropriate  CV: RR mild tachy, no edema  Resp: CTAB, no upper airway sounds  Abd: NTND  Neuro: AOx4, following commands    Recent Results (from the past 72 hour(s))   CBC with Differential    Collection Time: 12/12/18  6:00 AM   Result Value Ref Range    WBC 8.4 4.8 - 10.8 K/uL    RBC 3.83 (L) 4.20 - 5.40 M/uL    Hemoglobin 9.8 (L) 12.0 - 16.0 g/dL    Hematocrit 29.8 (L) 37.0 - 47.0 %    MCV 77.8 (L) 81.4 - 97.8 fL    MCH 25.6 (L) 27.0 - 33.0 pg    MCHC 32.9 (L) 33.6 - 35.0 g/dL    RDW 50.1 (H) 35.9 - 50.0 fL    Platelet Count 338 164 - 446 K/uL    MPV 9.8 9.0 - 12.9 fL    Neutrophils-Polys 59.00 44.00 - 72.00 %    Lymphocytes 24.00 22.00 - 41.00 %    Monocytes 9.10 0.00 - 13.40 %    Eosinophils 6.90 0.00 - 6.90 %    Basophils 0.60 0.00 - 1.80 %    Immature Granulocytes 0.40 0.00 - 0.90 %    Nucleated RBC 0.00 /100 WBC    Neutrophils (Absolute) 4.93 2.00 - 7.15 K/uL    Lymphs (Absolute) 2.01 1.00 - 4.80 K/uL    Monos (Absolute) 0.76 0.00 - 0.85 K/uL    Eos (Absolute) 0.58 (H) 0.00 - 0.51 K/uL    Baso (Absolute) 0.05 0.00 - 0.12 K/uL    Immature Granulocytes (abs) 0.03 0.00 - 0.11 K/uL    NRBC (Absolute) 0.00 K/uL   Comp Metabolic Panel (CMP)    Collection Time: 12/12/18  6:00 AM   Result Value Ref Range    Sodium 138 135 - 145 mmol/L    " Potassium 3.7 3.6 - 5.5 mmol/L    Chloride 101 96 - 112 mmol/L    Co2 31 20 - 33 mmol/L    Anion Gap 6.0 0.0 - 11.9    Glucose 100 (H) 65 - 99 mg/dL    Bun 10 8 - 22 mg/dL    Creatinine 0.53 0.50 - 1.40 mg/dL    Calcium 9.0 8.5 - 10.5 mg/dL    AST(SGOT) 16 12 - 45 U/L    ALT(SGPT) 10 2 - 50 U/L    Alkaline Phosphatase 82 30 - 99 U/L    Total Bilirubin 0.3 0.1 - 1.5 mg/dL    Albumin 2.9 (L) 3.2 - 4.9 g/dL    Total Protein 6.0 6.0 - 8.2 g/dL    Globulin 3.1 1.9 - 3.5 g/dL    A-G Ratio 0.9 g/dL   HEMOGLOBIN A1C    Collection Time: 12/12/18  6:00 AM   Result Value Ref Range    Glycohemoglobin 5.9 (H) 0.0 - 5.6 %    Est Avg Glucose 123 mg/dL   TSH with Reflex to FT4    Collection Time: 12/12/18  6:00 AM   Result Value Ref Range    TSH 2.960 0.380 - 5.330 uIU/mL   Vitamin D, 25-hydroxy (blood)    Collection Time: 12/12/18  6:00 AM   Result Value Ref Range    25-Hydroxy   Vitamin D 25 14 (L) 30 - 100 ng/mL   ESTIMATED GFR    Collection Time: 12/12/18  6:00 AM   Result Value Ref Range    GFR If African American >60 >60 mL/min/1.73 m 2    GFR If Non African American >60 >60 mL/min/1.73 m 2   EC-ECHOCARDIOGRAM COMPLETE W/O CONT    Collection Time: 12/13/18  8:46 AM   Result Value Ref Range    Eject.Frac. MOD BP 58.72     Eject.Frac. MOD 4C 52.63     Eject.Frac. MOD 2C 64.58     Left Ventrical Ejection Fraction 60        Current Facility-Administered Medications   Medication Frequency   • neomycin sulf/polymyx B sulf/HC soln (CORTISPORIN HC SOL) 3.5-93896-2 otic solution 4 Drop PRN   • bisacodyl (DULCOLAX) suppository 10 mg DAILY   • polyethylene glycol/lytes (MIRALAX) PACKET 1 Packet QDAY PRN   • magnesium hydroxide (MILK OF MAGNESIA) suspension 30 mL QDAY PRN   • bisacodyl (DULCOLAX) suppository 10 mg QDAY PRN   • valACYclovir (VALTREX) caplet 1,000 mg TID   • Respiratory Care per Protocol Continuous RT   • Pharmacy Consult Request ...Pain Management Review 1 Each PRN   • acetaminophen (TYLENOL) tablet 1,000 mg Q6HRS   •  oxyCODONE immediate-release (ROXICODONE) tablet 5 mg Q3HRS PRN   • oxyCODONE immediate release (ROXICODONE) tablet 10 mg Q3HRS PRN   • hydrALAZINE (APRESOLINE) tablet 25 mg Q8HRS PRN   • artificial tears 1.4 % ophthalmic solution 1 Drop PRN   • benzocaine-menthol (CEPACOL) lozenge 1 Lozenge Q2HRS PRN   • mag hydrox-al hydrox-simeth (MAALOX PLUS ES or MYLANTA DS) suspension 20 mL Q2HRS PRN   • ondansetron (ZOFRAN ODT) dispertab 4 mg 4X/DAY PRN    Or   • ondansetron (ZOFRAN) syringe/vial injection 4 mg 4X/DAY PRN   • traZODone (DESYREL) tablet 50 mg QHS PRN   • sodium chloride (OCEAN) 0.65 % nasal spray 2 Spray PRN   • enoxaparin (LOVENOX) inj 40 mg DAILY   • ALPRAZolam (XANAX) tablet 0.5 mg TID PRN   • calcium carbonate (TUMS) chewable tab 500 mg BID   • docusate sodium (COLACE) capsule 100 mg BID   • promethazine (PHENERGAN) tablet 12.5-25 mg Q4HRS PRN   • vitamin D (cholecalciferol) tablet 5,000 Units DAILY   • cyanocobalamin (VITAMIN B12) tablet 1,000 mcg DAILY   • furosemide (LASIX) tablet 40 mg DAILY   • gabapentin (NEURONTIN) capsule 300 mg TID   • methocarbamol (ROBAXIN) tablet 1,000 mg 4X/DAY   • morphine ER (MS CONTIN) tablet 30 mg Q12HRS   • potassium chloride SA (Kdur) tablet 20 mEq DAILY   • normal saline PF 60 mL Q12HRS   • senna-docusate (PERICOLACE or SENOKOT S) 8.6-50 MG per tablet 1 Tab BID   • omeprazole (PRILOSEC) capsule 20 mg DAILY       Orders Placed This Encounter   Procedures   • Diet Order Regular (all meats chopped, sandwiches cut in 1/4)     Standing Status:   Standing     Number of Occurrences:   1     Order Specific Question:   Diet:     Answer:   Regular [1]     Comments:   all meats chopped, sandwiches cut in 1/4     Order Specific Question:   Consistency/Fluid modifications:     Answer:   Thin Liquids [3]       Assessment:  Active Hospital Problems    Diagnosis   • *Cervical myelopathy (HCC)   • Left hip pain   • Obesity (BMI 30-39.9)   • Primary osteoarthritis of left hip- dr nowak;  left THR tbd feb, 2019   • Essential hypertension   • Vitamin B 12 deficiency       Medical Decision Making and Plan:  Cervical Myelopathy/C4 AIS C SCI - Patient is s/p ACDF C3-6 with C4 and C5 corpectomy and cage on 12/5/18 and C3-C5 laminectomies with C3-T1 posterior fusion on 12/7/18 with Dr. Mendoza.  Patient most likely C4 AIS C as prior neurogenic bladder and numbness.    -C collar off for showers and meals per NSG. Will need follow-up with Dr. Mendoza  -PT and OT for mobility and ADLs. Patient previously at wheelchair level due to L hip pain.     Pain control - Patient at baseline on opiates with pain specialist.  Patient currently on Gabapentin 300 mg TID, Tylenol 1000 mg Q6, Robaxin 1000 mg QID, MS Contin 30 mg q12h and Oxycodone PRN.   -Discussed will need to decrease medication usage.  Will also be required to follow-up with pain specialist at discharge. Utilized 40 mg in last 24 hours.   -Tried to wean to Oxycodone 5 mg and patient with intolerance     Dysphagia - Patient evaluated by SLP at [facility] and recommending Dysphagia 3 with thin liquids. SLP to evaluate - recommend regular with thins. Resolved     Neurogenic bladder - Patient reports incontinence prior to surgery. She reports some sensation.  Will check PVRs and catheterize if necessary. PVRs 34 and 51     Neurogenic Bowel - Vs constipation from opiates. Continue to monitor. Added daily suppository as no BM in 3 days, will continue to monitor. Large BM after nightly suppository 12/13/18. Continue nightly BTP     Hypokalemia - Patient on 20 mg daily on transfer. On Lasix.      Chronic edema - Patient on Lasix 40 mg on transfer. Per discussion with hospitalist check TTE - EF of 60%     Anemia - Check CBC on admission - 9.8 on admission     L buttock lesion - Appears verrucous, nursing concerned for possible shingles outbreak. Per patient not noted prior.   -Discussed with hospitalist, Herpes simplex vs HPV. Hospitalist to start valacyclovir. Discontinue  isolation     B12/Vit D Def - Patient on supplements on transfer.  Vitamin D 14 on admission, continue 5000 U daily       GI Ppx - Will add Prilosec on admission.     DVT Ppx - Patient on Lovenox 40 mg daily.     Total time:  25 minutes.  I spent greater than 50% of the time for patient care, counseling, and coordination on this date, including unit/floor time, and face-to-face time with the patient as per interval events and assessment and plan above. Topics discussed included pain control and opiate weaning. Failed wean down to 5 mg    Erendira Harper M.D.

## 2018-12-14 NOTE — ASSESSMENT & PLAN NOTE
Continue with PO iron supplementation  Upon discharge recommend outpatient GI appointment for diagnostic colonoscopy

## 2018-12-14 NOTE — DISCHARGE PLANNING
Case Management;  I assisted patient with completion of more insurance/disability paperwork.  Faxed this to her  at her work and to her short term disability company.  Provided confirmations and original copies to patient.

## 2018-12-15 PROCEDURE — 700102 HCHG RX REV CODE 250 W/ 637 OVERRIDE(OP): Performed by: PHYSICAL MEDICINE & REHABILITATION

## 2018-12-15 PROCEDURE — 700112 HCHG RX REV CODE 229: Performed by: PHYSICAL MEDICINE & REHABILITATION

## 2018-12-15 PROCEDURE — 700102 HCHG RX REV CODE 250 W/ 637 OVERRIDE(OP): Performed by: HOSPITALIST

## 2018-12-15 PROCEDURE — 97535 SELF CARE MNGMENT TRAINING: CPT

## 2018-12-15 PROCEDURE — A9270 NON-COVERED ITEM OR SERVICE: HCPCS | Performed by: PHYSICAL MEDICINE & REHABILITATION

## 2018-12-15 PROCEDURE — 700101 HCHG RX REV CODE 250: Performed by: PHYSICAL MEDICINE & REHABILITATION

## 2018-12-15 PROCEDURE — 97530 THERAPEUTIC ACTIVITIES: CPT

## 2018-12-15 PROCEDURE — A9270 NON-COVERED ITEM OR SERVICE: HCPCS | Performed by: HOSPITALIST

## 2018-12-15 PROCEDURE — 97112 NEUROMUSCULAR REEDUCATION: CPT

## 2018-12-15 PROCEDURE — 770010 HCHG ROOM/CARE - REHAB SEMI PRIVAT*

## 2018-12-15 PROCEDURE — 700111 HCHG RX REV CODE 636 W/ 250 OVERRIDE (IP): Performed by: PHYSICAL MEDICINE & REHABILITATION

## 2018-12-15 PROCEDURE — 99232 SBSQ HOSP IP/OBS MODERATE 35: CPT | Performed by: HOSPITALIST

## 2018-12-15 PROCEDURE — 97116 GAIT TRAINING THERAPY: CPT

## 2018-12-15 PROCEDURE — 97110 THERAPEUTIC EXERCISES: CPT

## 2018-12-15 RX ORDER — MORPHINE SULFATE 30 MG/1
30 TABLET, FILM COATED, EXTENDED RELEASE ORAL 2 TIMES DAILY
Status: DISCONTINUED | OUTPATIENT
Start: 2018-12-15 | End: 2018-12-26 | Stop reason: HOSPADM

## 2018-12-15 RX ADMIN — ENOXAPARIN SODIUM 40 MG: 100 INJECTION SUBCUTANEOUS at 09:58

## 2018-12-15 RX ADMIN — GABAPENTIN 300 MG: 300 CAPSULE ORAL at 15:50

## 2018-12-15 RX ADMIN — SENNOSIDES AND DOCUSATE SODIUM 1 TABLET: 8.6; 5 TABLET ORAL at 21:39

## 2018-12-15 RX ADMIN — METHOCARBAMOL 1000 MG: 500 TABLET ORAL at 09:58

## 2018-12-15 RX ADMIN — ACETAMINOPHEN 1000 MG: 500 TABLET, FILM COATED ORAL at 13:21

## 2018-12-15 RX ADMIN — VALACYCLOVIR HYDROCHLORIDE 1000 MG: 500 TABLET, FILM COATED ORAL at 10:03

## 2018-12-15 RX ADMIN — MORPHINE SULFATE 30 MG: 30 TABLET, EXTENDED RELEASE ORAL at 09:59

## 2018-12-15 RX ADMIN — GABAPENTIN 300 MG: 300 CAPSULE ORAL at 10:03

## 2018-12-15 RX ADMIN — CYANOCOBALAMIN TAB 1000 MCG 1000 MCG: 1000 TAB at 09:59

## 2018-12-15 RX ADMIN — SENNOSIDES AND DOCUSATE SODIUM 1 TABLET: 8.6; 5 TABLET ORAL at 09:59

## 2018-12-15 RX ADMIN — MORPHINE SULFATE 30 MG: 30 TABLET, EXTENDED RELEASE ORAL at 17:57

## 2018-12-15 RX ADMIN — METHOCARBAMOL 1000 MG: 500 TABLET ORAL at 17:57

## 2018-12-15 RX ADMIN — Medication 60 ML: at 21:00

## 2018-12-15 RX ADMIN — Medication 60 ML: at 10:03

## 2018-12-15 RX ADMIN — VITAMIN D, TAB 1000IU (100/BT) 5000 UNITS: 25 TAB at 10:03

## 2018-12-15 RX ADMIN — OXYCODONE HYDROCHLORIDE 10 MG: 10 TABLET ORAL at 13:21

## 2018-12-15 RX ADMIN — CALCIUM CARBONATE (ANTACID) CHEW TAB 500 MG 500 MG: 500 CHEW TAB at 09:58

## 2018-12-15 RX ADMIN — VALACYCLOVIR HYDROCHLORIDE 1000 MG: 500 TABLET, FILM COATED ORAL at 21:39

## 2018-12-15 RX ADMIN — METHOCARBAMOL 1000 MG: 500 TABLET ORAL at 21:39

## 2018-12-15 RX ADMIN — DOCUSATE SODIUM 100 MG: 100 CAPSULE, LIQUID FILLED ORAL at 09:59

## 2018-12-15 RX ADMIN — FUROSEMIDE 40 MG: 40 TABLET ORAL at 09:59

## 2018-12-15 RX ADMIN — VALACYCLOVIR HYDROCHLORIDE 1000 MG: 500 TABLET, FILM COATED ORAL at 15:50

## 2018-12-15 RX ADMIN — ALPRAZOLAM 0.5 MG: 0.5 TABLET ORAL at 21:39

## 2018-12-15 RX ADMIN — ACETAMINOPHEN 1000 MG: 500 TABLET, FILM COATED ORAL at 06:42

## 2018-12-15 RX ADMIN — OXYCODONE HYDROCHLORIDE 10 MG: 10 TABLET ORAL at 06:42

## 2018-12-15 RX ADMIN — OMEPRAZOLE 20 MG: 20 CAPSULE, DELAYED RELEASE ORAL at 09:58

## 2018-12-15 RX ADMIN — ACETAMINOPHEN 1000 MG: 500 TABLET, FILM COATED ORAL at 17:57

## 2018-12-15 RX ADMIN — CALCIUM CARBONATE (ANTACID) CHEW TAB 500 MG 500 MG: 500 CHEW TAB at 21:38

## 2018-12-15 RX ADMIN — OXYCODONE HYDROCHLORIDE 10 MG: 10 TABLET ORAL at 17:57

## 2018-12-15 RX ADMIN — METHOCARBAMOL 1000 MG: 500 TABLET ORAL at 13:21

## 2018-12-15 RX ADMIN — DOCUSATE SODIUM 100 MG: 100 CAPSULE, LIQUID FILLED ORAL at 21:39

## 2018-12-15 RX ADMIN — POTASSIUM CHLORIDE 20 MEQ: 1500 TABLET, EXTENDED RELEASE ORAL at 09:59

## 2018-12-15 RX ADMIN — GABAPENTIN 300 MG: 300 CAPSULE ORAL at 21:39

## 2018-12-15 RX ADMIN — OXYCODONE HYDROCHLORIDE 10 MG: 10 TABLET ORAL at 21:38

## 2018-12-15 RX ADMIN — OXYCODONE HYDROCHLORIDE 10 MG: 10 TABLET ORAL at 10:03

## 2018-12-15 ASSESSMENT — ENCOUNTER SYMPTOMS
FOCAL WEAKNESS: 1
NECK PAIN: 1
PALPITATIONS: 0
EYES NEGATIVE: 1
COUGH: 0
NAUSEA: 0
SHORTNESS OF BREATH: 0
FEVER: 0
ABDOMINAL PAIN: 0
VOMITING: 0
CHILLS: 0

## 2018-12-15 ASSESSMENT — PAIN SCALES - GENERAL
PAINLEVEL_OUTOF10: ASSUMED PAIN PRESENT
PAINLEVEL_OUTOF10: 9
PAINLEVEL_OUTOF10: 10
PAINLEVEL_OUTOF10: ASSUMED PAIN PRESENT

## 2018-12-15 ASSESSMENT — GAIT ASSESSMENTS
DISTANCE (FEET): 7
ASSISTIVE DEVICE: PARALLEL BARS
DEVIATION: ANTALGIC;STEP TO;BRADYKINETIC;DECREASED HEEL STRIKE;DECREASED TOE OFF

## 2018-12-15 NOTE — PROGRESS NOTES
Pt requested MS contin times changed fro, 0900 and 2100 to 0600 and 1800. I spoke with Dr Yen and order received to change per pt request.

## 2018-12-15 NOTE — PROGRESS NOTES
Hospital Medicine Daily Progress Note        Chief Complaint  Edema    Interval Problem Update  Pt requesting to use SCD's for arm pain as well; defer to Primary Team.    Review of Systems  Review of Systems   Constitutional: Negative for chills and fever.   HENT: Negative.    Eyes: Negative.    Respiratory: Negative for cough and shortness of breath.    Cardiovascular: Negative for chest pain and palpitations.   Gastrointestinal: Negative for abdominal pain, nausea and vomiting.   Musculoskeletal: Positive for neck pain.        Wound pain   Skin: Positive for rash.   Neurological: Positive for focal weakness.        Physical Exam  Temp:  [36.2 °C (97.2 °F)] 36.2 °C (97.2 °F)  Pulse:  [97-99] 97  Resp:  [18] 18  BP: (115-124)/(75-79) 115/79    Physical Exam   Constitutional: She is oriented to person, place, and time. No distress.   HENT:   Head: Normocephalic and atraumatic.   Right Ear: External ear normal.   Left Ear: External ear normal.   Eyes: Conjunctivae and EOM are normal. Right eye exhibits no discharge. Left eye exhibits no discharge.   Neck:   C-collar   Cardiovascular: Normal rate and regular rhythm.    Pulmonary/Chest: Effort normal and breath sounds normal. No stridor. No respiratory distress. She has no wheezes.   Abdominal: Soft. Bowel sounds are normal. She exhibits no distension. There is no tenderness. There is no rebound and no guarding.   Musculoskeletal: She exhibits edema.   3+ edema LLE, 2+ RLE   Neurological: She is alert and oriented to person, place, and time.   Skin: Skin is warm and dry. She is not diaphoretic.   Left buttock w/ early crusting of prior vesicular lesions   Vitals reviewed.      Fluids  No intake or output data in the 24 hours ending 12/15/18 3396    Laboratory                        Assessment/Plan  * Cervical myelopathy (HCC)   Assessment & Plan    S/P cervical laminectomy and fusion  Cervical collar     Anemia   Assessment & Plan    Will check Fe Panel w/ next draw  given microcytosis     Rash   Assessment & Plan    Left buttock vesicular lesions concerning for Herpes Zoster vs Herpes Simplex  Continue 7 day course of Valtrex     Vitamin D deficiency   Assessment & Plan    Vit D level 14  On supplementation     Edema   Assessment & Plan    Pt reports that at home she uses SCD's  Echo shows EF 60%  Request BLE Venous Duplex  Continue to diurese w/ Lasix     Essential hypertension- (present on admission)   Assessment & Plan    Presently controlled with Lasix  Continue to monitor BP trends     History of gastric bypass- (present on admission)   Assessment & Plan    Monitor for electrolyte disturbances     Full Code

## 2018-12-16 LAB
ANION GAP SERPL CALC-SCNC: 5 MMOL/L (ref 0–11.9)
BUN SERPL-MCNC: 8 MG/DL (ref 8–22)
CALCIUM SERPL-MCNC: 8.9 MG/DL (ref 8.5–10.5)
CHLORIDE SERPL-SCNC: 104 MMOL/L (ref 96–112)
CO2 SERPL-SCNC: 30 MMOL/L (ref 20–33)
CREAT SERPL-MCNC: 0.53 MG/DL (ref 0.5–1.4)
ERYTHROCYTE [DISTWIDTH] IN BLOOD BY AUTOMATED COUNT: 49.8 FL (ref 35.9–50)
GLUCOSE SERPL-MCNC: 102 MG/DL (ref 65–99)
HCT VFR BLD AUTO: 27 % (ref 37–47)
HGB BLD-MCNC: 9 G/DL (ref 12–16)
IRON SATN MFR SERPL: 7 % (ref 15–55)
IRON SERPL-MCNC: 27 UG/DL (ref 40–170)
MCH RBC QN AUTO: 25.9 PG (ref 27–33)
MCHC RBC AUTO-ENTMCNC: 33.3 G/DL (ref 33.6–35)
MCV RBC AUTO: 77.8 FL (ref 81.4–97.8)
PLATELET # BLD AUTO: 385 K/UL (ref 164–446)
PMV BLD AUTO: 9.5 FL (ref 9–12.9)
POTASSIUM SERPL-SCNC: 3.9 MMOL/L (ref 3.6–5.5)
RBC # BLD AUTO: 3.47 M/UL (ref 4.2–5.4)
SODIUM SERPL-SCNC: 139 MMOL/L (ref 135–145)
TIBC SERPL-MCNC: 389 UG/DL (ref 250–450)
WBC # BLD AUTO: 6.2 K/UL (ref 4.8–10.8)

## 2018-12-16 PROCEDURE — 700111 HCHG RX REV CODE 636 W/ 250 OVERRIDE (IP): Performed by: PHYSICAL MEDICINE & REHABILITATION

## 2018-12-16 PROCEDURE — 700112 HCHG RX REV CODE 229: Performed by: PHYSICAL MEDICINE & REHABILITATION

## 2018-12-16 PROCEDURE — 700102 HCHG RX REV CODE 250 W/ 637 OVERRIDE(OP): Performed by: PHYSICAL MEDICINE & REHABILITATION

## 2018-12-16 PROCEDURE — 80048 BASIC METABOLIC PNL TOTAL CA: CPT

## 2018-12-16 PROCEDURE — 770010 HCHG ROOM/CARE - REHAB SEMI PRIVAT*

## 2018-12-16 PROCEDURE — 85027 COMPLETE CBC AUTOMATED: CPT

## 2018-12-16 PROCEDURE — 83540 ASSAY OF IRON: CPT

## 2018-12-16 PROCEDURE — 700102 HCHG RX REV CODE 250 W/ 637 OVERRIDE(OP): Performed by: HOSPITALIST

## 2018-12-16 PROCEDURE — 83550 IRON BINDING TEST: CPT

## 2018-12-16 PROCEDURE — A9270 NON-COVERED ITEM OR SERVICE: HCPCS | Performed by: PHYSICAL MEDICINE & REHABILITATION

## 2018-12-16 PROCEDURE — 700101 HCHG RX REV CODE 250: Performed by: PHYSICAL MEDICINE & REHABILITATION

## 2018-12-16 PROCEDURE — A9270 NON-COVERED ITEM OR SERVICE: HCPCS | Performed by: HOSPITALIST

## 2018-12-16 PROCEDURE — 99232 SBSQ HOSP IP/OBS MODERATE 35: CPT | Performed by: HOSPITALIST

## 2018-12-16 RX ORDER — FERROUS SULFATE 325(65) MG
325 TABLET ORAL
Status: DISCONTINUED | OUTPATIENT
Start: 2018-12-17 | End: 2018-12-26 | Stop reason: HOSPADM

## 2018-12-16 RX ORDER — ASCORBIC ACID 500 MG
500 TABLET ORAL
Status: DISCONTINUED | OUTPATIENT
Start: 2018-12-17 | End: 2018-12-26 | Stop reason: HOSPADM

## 2018-12-16 RX ADMIN — GABAPENTIN 300 MG: 300 CAPSULE ORAL at 16:10

## 2018-12-16 RX ADMIN — DOCUSATE SODIUM 100 MG: 100 CAPSULE, LIQUID FILLED ORAL at 21:53

## 2018-12-16 RX ADMIN — METHOCARBAMOL 1000 MG: 500 TABLET ORAL at 10:23

## 2018-12-16 RX ADMIN — METHOCARBAMOL 1000 MG: 500 TABLET ORAL at 21:53

## 2018-12-16 RX ADMIN — ACETAMINOPHEN 1000 MG: 500 TABLET, FILM COATED ORAL at 18:40

## 2018-12-16 RX ADMIN — ENOXAPARIN SODIUM 40 MG: 100 INJECTION SUBCUTANEOUS at 10:36

## 2018-12-16 RX ADMIN — OXYCODONE HYDROCHLORIDE 10 MG: 10 TABLET ORAL at 13:48

## 2018-12-16 RX ADMIN — GABAPENTIN 300 MG: 300 CAPSULE ORAL at 21:53

## 2018-12-16 RX ADMIN — VALACYCLOVIR HYDROCHLORIDE 1000 MG: 500 TABLET, FILM COATED ORAL at 10:23

## 2018-12-16 RX ADMIN — Medication 60 ML: at 21:52

## 2018-12-16 RX ADMIN — ALPRAZOLAM 0.5 MG: 0.5 TABLET ORAL at 10:23

## 2018-12-16 RX ADMIN — OXYCODONE HYDROCHLORIDE 10 MG: 10 TABLET ORAL at 10:23

## 2018-12-16 RX ADMIN — METHOCARBAMOL 1000 MG: 500 TABLET ORAL at 13:49

## 2018-12-16 RX ADMIN — VALACYCLOVIR HYDROCHLORIDE 1000 MG: 500 TABLET, FILM COATED ORAL at 21:53

## 2018-12-16 RX ADMIN — VALACYCLOVIR HYDROCHLORIDE 1000 MG: 500 TABLET, FILM COATED ORAL at 16:09

## 2018-12-16 RX ADMIN — CALCIUM CARBONATE (ANTACID) CHEW TAB 500 MG 500 MG: 500 CHEW TAB at 09:00

## 2018-12-16 RX ADMIN — SENNOSIDES AND DOCUSATE SODIUM 1 TABLET: 8.6; 5 TABLET ORAL at 21:53

## 2018-12-16 RX ADMIN — OXYCODONE HYDROCHLORIDE 10 MG: 10 TABLET ORAL at 18:40

## 2018-12-16 RX ADMIN — POTASSIUM CHLORIDE 20 MEQ: 1500 TABLET, EXTENDED RELEASE ORAL at 10:24

## 2018-12-16 RX ADMIN — MORPHINE SULFATE 30 MG: 30 TABLET, EXTENDED RELEASE ORAL at 18:40

## 2018-12-16 RX ADMIN — CALCIUM CARBONATE (ANTACID) CHEW TAB 500 MG 500 MG: 500 CHEW TAB at 21:52

## 2018-12-16 RX ADMIN — CYANOCOBALAMIN TAB 1000 MCG 1000 MCG: 1000 TAB at 10:24

## 2018-12-16 RX ADMIN — DOCUSATE SODIUM 100 MG: 100 CAPSULE, LIQUID FILLED ORAL at 10:23

## 2018-12-16 RX ADMIN — GABAPENTIN 300 MG: 300 CAPSULE ORAL at 10:24

## 2018-12-16 RX ADMIN — ALPRAZOLAM 0.5 MG: 0.5 TABLET ORAL at 21:53

## 2018-12-16 RX ADMIN — OXYCODONE HYDROCHLORIDE 10 MG: 10 TABLET ORAL at 05:19

## 2018-12-16 RX ADMIN — VITAMIN D, TAB 1000IU (100/BT) 5000 UNITS: 25 TAB at 10:23

## 2018-12-16 RX ADMIN — OMEPRAZOLE 20 MG: 20 CAPSULE, DELAYED RELEASE ORAL at 10:19

## 2018-12-16 RX ADMIN — FUROSEMIDE 40 MG: 40 TABLET ORAL at 10:24

## 2018-12-16 RX ADMIN — ACETAMINOPHEN 1000 MG: 500 TABLET, FILM COATED ORAL at 05:19

## 2018-12-16 RX ADMIN — Medication 60 ML: at 10:37

## 2018-12-16 RX ADMIN — OXYCODONE HYDROCHLORIDE 10 MG: 10 TABLET ORAL at 21:52

## 2018-12-16 RX ADMIN — SENNOSIDES AND DOCUSATE SODIUM 1 TABLET: 8.6; 5 TABLET ORAL at 10:24

## 2018-12-16 RX ADMIN — MORPHINE SULFATE 30 MG: 30 TABLET, EXTENDED RELEASE ORAL at 05:19

## 2018-12-16 RX ADMIN — ACETAMINOPHEN 1000 MG: 500 TABLET, FILM COATED ORAL at 13:43

## 2018-12-16 RX ADMIN — METHOCARBAMOL 1000 MG: 500 TABLET ORAL at 18:41

## 2018-12-16 ASSESSMENT — PAIN SCALES - GENERAL
PAINLEVEL_OUTOF10: ASSUMED PAIN PRESENT
PAINLEVEL_OUTOF10: 4
PAINLEVEL_OUTOF10: 8
PAINLEVEL_OUTOF10: ASSUMED PAIN PRESENT
PAINLEVEL_OUTOF10: 9
PAINLEVEL_OUTOF10: 7
PAINLEVEL_OUTOF10: 4
PAINLEVEL_OUTOF10: 8

## 2018-12-16 ASSESSMENT — ENCOUNTER SYMPTOMS
FEVER: 0
NECK PAIN: 1
COUGH: 0
ABDOMINAL PAIN: 0
CHILLS: 0
NAUSEA: 0
SHORTNESS OF BREATH: 0
EYES NEGATIVE: 1
PALPITATIONS: 0
VOMITING: 0
FOCAL WEAKNESS: 1

## 2018-12-16 NOTE — PROGRESS NOTES
Hospital Medicine Daily Progress Note        Chief Complaint  Edema    Interval Problem Update  No new complaints.  BLE U/S negative DVT.    Review of Systems  Review of Systems   Constitutional: Negative for chills and fever.   HENT: Negative.    Eyes: Negative.    Respiratory: Negative for cough and shortness of breath.    Cardiovascular: Positive for leg swelling. Negative for chest pain and palpitations.   Gastrointestinal: Negative for abdominal pain, nausea and vomiting.   Musculoskeletal: Positive for neck pain.        Wound pain   Skin: Positive for rash.   Neurological: Positive for focal weakness.        Physical Exam  Temp:  [36.2 °C (97.2 °F)] 36.2 °C (97.2 °F)  Pulse:  [97-99] 97  Resp:  [18] 18  BP: (115-124)/(75-79) 115/79    Physical Exam   Constitutional: She is oriented to person, place, and time. No distress.   HENT:   Head: Normocephalic and atraumatic.   Right Ear: External ear normal.   Left Ear: External ear normal.   Eyes: Conjunctivae and EOM are normal. Right eye exhibits no discharge. Left eye exhibits no discharge.   Neck:   C-collar   Cardiovascular: Normal rate and regular rhythm.    Pulmonary/Chest: Effort normal and breath sounds normal. No stridor. No respiratory distress. She has no wheezes.   Abdominal: Soft. Bowel sounds are normal. She exhibits no distension. There is no tenderness. There is no rebound and no guarding.   Musculoskeletal: She exhibits edema.   3+ edema LLE, 2+ RLE   Neurological: She is alert and oriented to person, place, and time.   Skin: Skin is warm and dry. She is not diaphoretic.   Left buttock w/ early crusting of prior vesicular lesions   Vitals reviewed.      Fluids  No intake or output data in the 24 hours ending 12/15/18 1810    Laboratory                        Assessment/Plan  * Cervical myelopathy (HCC)   Assessment & Plan    S/P cervical laminectomy and fusion  Cervical collar     Anemia   Assessment & Plan    Check Fe Panel w/ next draw given  microcytosis     Rash   Assessment & Plan    Left buttock vesicular lesions concerning for Herpes Zoster vs Herpes Simplex  Continue 7 day course of Valtrex     Vitamin D deficiency   Assessment & Plan    Vit D level 14  On supplementation     Edema   Assessment & Plan    Pt reports that at home she uses SCD's  Echo shows EF 60%  BLE Venous Duplex negative for DVT  Continue Lasix as tolerated by BP and renal function     Essential hypertension- (present on admission)   Assessment & Plan    Presently controlled with Lasix  Continue to monitor BP trends     History of gastric bypass- (present on admission)   Assessment & Plan    Monitor for electrolyte disturbances     Full Code

## 2018-12-16 NOTE — CONSULTS
DATE OF SERVICE:  12/14/2018    BEHAVIORAL MEDICINE EVALUATION    BRIEF HISTORY OF PRESENTING COMPLAINTS:  The patient is a 53-year-old single   -American female who is referred for behavioral medicine evaluation by   Dr. Harvey.  The patient was transferred to rehab from acute where she was   admitted on 12/05/2018 for scheduled cervical intervention.  Patient has a   diagnosis of cervical myelopathy and quadriplegia, incomplete C1-C4.  Patient   underwent a 2-stage procedure with Dr. Mendoza for severe cervical   spondylolisthesis, stenosis, and myelopathy.  The patient was stabilized   acutely.  She was then sent to rehab to address her general debility.    PAST MEDICAL HISTORY:  Significant for anemia, arthritis, chronic back pain,   dental disorder, diffuse body pain, and urinary incontinence.    PSYCHOLOGICAL STATUS:  MENTAL STATUS EXAMINATION:  The patient is a well-nourished overweight female   of medium stature, who appeared her stated age of 53.  At presentation, the   patient was alert.  She was sitting upright in her bed when approached.  The   patient oriented well to my presence.  The patient was kempt in appearance.    She was dressed casually in street attire that was appropriate to her age and   setting.  The patient's manner of presentation was cooperative and friendly.    Patient was well oriented to time, place, and person.  Her language was   logical and goal oriented, and her speech was normal for rate and rhythm.  The   patient's concentration and memory functioning appeared intact.    The patient's affect was somewhat constricted, stable, and mildly intense.    She related well.  Her mood appeared anxious, but appropriate to the context.    There was no evidence of delusional or perceptual disturbance.  Also, the   patient showed no unusual pain or motor behavior during the interview.    SPECIFIC BEHAVIORAL COMPLAINTS:  Patient admitted to mild symptoms of   generalized mood  disturbance.  She reported in the past several days she has   felt restless, somewhat tense, keyed up, worried, and sad.  She says she is   discouraged about the future and at times she feels nervous.  The patient   denied any strong feelings of guilt, worthlessness, or hopelessness.  She   reported no thoughts of wanting to die.    Patient admitted to severe levels of neck and upper back pain.  She rated her   pain as an 8-9/10 in average intensity.  She is being treated with opioids and   neuropathic medications.  Patient said she is asking for medications on a   routine basis.    Other problems mentioned by the patient included diminished appetite and   energy levels.  She also reported some sleep problems, but she says she is   starting to get enough restful sleep.    The patient reported no interpersonal discord or discomfort or any problems   managing her day-to-day stressors.  She also denied any family discord.    The patient reported no ETOH or other drug abuse or any use of tobacco.    PSYCHIATRIC HISTORY:  The patient reported a remote history of counseling   years ago.  She said this was for minor adjustment difficulties.  She   identified no mental health contact since.    PSYCHOMETRIC TESTING:  The patient was administered 2 psychometric tests and 1   screening instrument.  The PS/PC-R revealed symptoms of mild generalized mood   disturbance.  Her CDR survey showed no problems with level of consciousness,   attention, thinking, perception, speech, or memory.  She did report   significant problems with behavioral activation, basic self-care, some loss of   vigor, and problems staying asleep.  She also reported mild symptoms of   generalized mood disturbance, severe neck and upper back pain, and   diminishment in her appetite.    The patient was screened for any risk of suicide based on previous suicide   attempts, severe hopelessness, acute suicidal ideation, attraction to death,   family history of  suicide, and acute overuse of alcohol.  The patient's risk   is considered very low.    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  She is single.  She lives alone in [place], [place].    IMPRESSION:  Adjustment disorder with mixed emotional features.    RECOMMENDATIONS:  Patient will be followed for status and supportive care.       ____________________________________     MARIBEL FINK, PHD    JENIFFER / RICHARD    DD:  12/16/2018 11:14:12  DT:  12/16/2018 12:19:05    D#:  [id number]  Job#:  [id number]

## 2018-12-17 PROCEDURE — 700102 HCHG RX REV CODE 250 W/ 637 OVERRIDE(OP): Performed by: PHYSICAL MEDICINE & REHABILITATION

## 2018-12-17 PROCEDURE — A9270 NON-COVERED ITEM OR SERVICE: HCPCS | Performed by: PHYSICAL MEDICINE & REHABILITATION

## 2018-12-17 PROCEDURE — 97530 THERAPEUTIC ACTIVITIES: CPT

## 2018-12-17 PROCEDURE — 99232 SBSQ HOSP IP/OBS MODERATE 35: CPT | Performed by: HOSPITALIST

## 2018-12-17 PROCEDURE — 97110 THERAPEUTIC EXERCISES: CPT

## 2018-12-17 PROCEDURE — 99233 SBSQ HOSP IP/OBS HIGH 50: CPT | Performed by: PHYSICAL MEDICINE & REHABILITATION

## 2018-12-17 PROCEDURE — A9270 NON-COVERED ITEM OR SERVICE: HCPCS | Performed by: HOSPITALIST

## 2018-12-17 PROCEDURE — 770010 HCHG ROOM/CARE - REHAB SEMI PRIVAT*

## 2018-12-17 PROCEDURE — 700111 HCHG RX REV CODE 636 W/ 250 OVERRIDE (IP): Performed by: PHYSICAL MEDICINE & REHABILITATION

## 2018-12-17 PROCEDURE — 97535 SELF CARE MNGMENT TRAINING: CPT

## 2018-12-17 PROCEDURE — 700112 HCHG RX REV CODE 229: Performed by: PHYSICAL MEDICINE & REHABILITATION

## 2018-12-17 PROCEDURE — 97112 NEUROMUSCULAR REEDUCATION: CPT

## 2018-12-17 PROCEDURE — 700101 HCHG RX REV CODE 250: Performed by: PHYSICAL MEDICINE & REHABILITATION

## 2018-12-17 PROCEDURE — 700102 HCHG RX REV CODE 250 W/ 637 OVERRIDE(OP): Performed by: HOSPITALIST

## 2018-12-17 RX ORDER — METHOCARBAMOL 500 MG/1
1000 TABLET, FILM COATED ORAL 4 TIMES DAILY
Status: DISCONTINUED | OUTPATIENT
Start: 2018-12-17 | End: 2018-12-21

## 2018-12-17 RX ADMIN — SENNOSIDES AND DOCUSATE SODIUM 1 TABLET: 8.6; 5 TABLET ORAL at 09:04

## 2018-12-17 RX ADMIN — MORPHINE SULFATE 30 MG: 30 TABLET, EXTENDED RELEASE ORAL at 19:05

## 2018-12-17 RX ADMIN — METHOCARBAMOL 1000 MG: 500 TABLET ORAL at 15:48

## 2018-12-17 RX ADMIN — CYANOCOBALAMIN TAB 1000 MCG 1000 MCG: 1000 TAB at 09:03

## 2018-12-17 RX ADMIN — VALACYCLOVIR HYDROCHLORIDE 1000 MG: 500 TABLET, FILM COATED ORAL at 15:49

## 2018-12-17 RX ADMIN — VALACYCLOVIR HYDROCHLORIDE 1000 MG: 500 TABLET, FILM COATED ORAL at 09:03

## 2018-12-17 RX ADMIN — OXYCODONE HYDROCHLORIDE 10 MG: 10 TABLET ORAL at 12:26

## 2018-12-17 RX ADMIN — POTASSIUM CHLORIDE 20 MEQ: 1500 TABLET, EXTENDED RELEASE ORAL at 09:03

## 2018-12-17 RX ADMIN — OXYCODONE HYDROCHLORIDE 10 MG: 10 TABLET ORAL at 15:49

## 2018-12-17 RX ADMIN — GABAPENTIN 300 MG: 300 CAPSULE ORAL at 09:04

## 2018-12-17 RX ADMIN — METHOCARBAMOL 1000 MG: 500 TABLET ORAL at 09:03

## 2018-12-17 RX ADMIN — DOCUSATE SODIUM 100 MG: 100 CAPSULE, LIQUID FILLED ORAL at 21:00

## 2018-12-17 RX ADMIN — CALCIUM CARBONATE (ANTACID) CHEW TAB 500 MG 500 MG: 500 CHEW TAB at 21:00

## 2018-12-17 RX ADMIN — FUROSEMIDE 40 MG: 40 TABLET ORAL at 09:04

## 2018-12-17 RX ADMIN — MORPHINE SULFATE 30 MG: 30 TABLET, EXTENDED RELEASE ORAL at 06:03

## 2018-12-17 RX ADMIN — OXYCODONE HYDROCHLORIDE 10 MG: 10 TABLET ORAL at 18:33

## 2018-12-17 RX ADMIN — Medication 60 ML: at 21:07

## 2018-12-17 RX ADMIN — CALCIUM CARBONATE (ANTACID) CHEW TAB 500 MG 500 MG: 500 CHEW TAB at 09:02

## 2018-12-17 RX ADMIN — ALUMINUM HYDROXIDE, MAGNESIUM HYDROXIDE, AND DIMETHICONE 20 ML: 400; 400; 40 SUSPENSION ORAL at 12:52

## 2018-12-17 RX ADMIN — METHOCARBAMOL 1000 MG: 500 TABLET ORAL at 21:00

## 2018-12-17 RX ADMIN — GABAPENTIN 300 MG: 300 CAPSULE ORAL at 21:00

## 2018-12-17 RX ADMIN — VALACYCLOVIR HYDROCHLORIDE 1000 MG: 500 TABLET, FILM COATED ORAL at 21:00

## 2018-12-17 RX ADMIN — DOCUSATE SODIUM 100 MG: 100 CAPSULE, LIQUID FILLED ORAL at 09:04

## 2018-12-17 RX ADMIN — SENNOSIDES AND DOCUSATE SODIUM 1 TABLET: 8.6; 5 TABLET ORAL at 21:00

## 2018-12-17 RX ADMIN — ACETAMINOPHEN 1000 MG: 500 TABLET, FILM COATED ORAL at 06:03

## 2018-12-17 RX ADMIN — OMEPRAZOLE 20 MG: 20 CAPSULE, DELAYED RELEASE ORAL at 09:03

## 2018-12-17 RX ADMIN — FERROUS SULFATE TAB 325 MG (65 MG ELEMENTAL FE) 325 MG: 325 (65 FE) TAB at 09:03

## 2018-12-17 RX ADMIN — ENOXAPARIN SODIUM 40 MG: 100 INJECTION SUBCUTANEOUS at 09:04

## 2018-12-17 RX ADMIN — OXYCODONE HYDROCHLORIDE 10 MG: 10 TABLET ORAL at 09:04

## 2018-12-17 RX ADMIN — Medication 60 ML: at 09:04

## 2018-12-17 RX ADMIN — ACETAMINOPHEN 1000 MG: 500 TABLET, FILM COATED ORAL at 12:26

## 2018-12-17 RX ADMIN — OXYCODONE HYDROCHLORIDE AND ACETAMINOPHEN 500 MG: 500 TABLET ORAL at 09:03

## 2018-12-17 RX ADMIN — GABAPENTIN 300 MG: 300 CAPSULE ORAL at 15:49

## 2018-12-17 RX ADMIN — VITAMIN D, TAB 1000IU (100/BT) 5000 UNITS: 25 TAB at 09:03

## 2018-12-17 RX ADMIN — OXYCODONE HYDROCHLORIDE 10 MG: 10 TABLET ORAL at 06:03

## 2018-12-17 RX ADMIN — ACETAMINOPHEN 1000 MG: 500 TABLET, FILM COATED ORAL at 18:33

## 2018-12-17 ASSESSMENT — PAIN SCALES - GENERAL
PAINLEVEL_OUTOF10: 9
PAINLEVEL_OUTOF10: 9
PAINLEVEL_OUTOF10: ASSUMED PAIN PRESENT
PAINLEVEL_OUTOF10: 9
PAINLEVEL_OUTOF10: 8
PAINLEVEL_OUTOF10: ASSUMED PAIN PRESENT

## 2018-12-17 ASSESSMENT — ENCOUNTER SYMPTOMS
CHILLS: 0
NECK PAIN: 1
SHORTNESS OF BREATH: 0
COUGH: 0
ABDOMINAL PAIN: 0
FOCAL WEAKNESS: 1
EYES NEGATIVE: 1
NAUSEA: 0
VOMITING: 0
PALPITATIONS: 0
FEVER: 0

## 2018-12-17 NOTE — PROGRESS NOTES
Hospital Medicine Daily Progress Note        Chief Complaint  Edema    Interval Problem Update  No 24 hour events or issues.    Review of Systems  Review of Systems   Constitutional: Negative for chills and fever.   HENT: Negative.    Eyes: Negative.    Respiratory: Negative for cough and shortness of breath.    Cardiovascular: Positive for leg swelling. Negative for chest pain and palpitations.   Gastrointestinal: Negative for abdominal pain, nausea and vomiting.   Musculoskeletal: Positive for neck pain.        Wound pain   Skin: Positive for rash.   Neurological: Positive for focal weakness.        Physical Exam  Temp:  [36.4 °C (97.6 °F)-36.8 °C (98.3 °F)] 36.8 °C (98.3 °F)  Pulse:  [66-98] 66  Resp:  [18] 18  BP: (102-117)/(65-76) 114/65    Physical Exam   Constitutional: She is oriented to person, place, and time. No distress.   HENT:   Head: Normocephalic and atraumatic.   Right Ear: External ear normal.   Left Ear: External ear normal.   Eyes: Conjunctivae and EOM are normal. Right eye exhibits no discharge. Left eye exhibits no discharge.   Neck:   C-collar   Cardiovascular: Normal rate and regular rhythm.    Pulmonary/Chest: Effort normal and breath sounds normal. No stridor. No respiratory distress. She has no wheezes.   Abdominal: Soft. Bowel sounds are normal. She exhibits no distension. There is no tenderness. There is no rebound and no guarding.   Musculoskeletal: She exhibits edema.   3+ edema LLE, 2+ RLE   Neurological: She is alert and oriented to person, place, and time.   Skin: Skin is warm and dry. She is not diaphoretic.   Vitals reviewed.      Fluids    Intake/Output Summary (Last 24 hours) at 12/16/18 1730  Last data filed at 12/16/18 0400   Gross per 24 hour   Intake              240 ml   Output                0 ml   Net              240 ml       Laboratory  Recent Labs      12/16/18   0555   WBC  6.2   RBC  3.47*   HEMOGLOBIN  9.0*   HEMATOCRIT  27.0*   MCV  77.8*   MCH  25.9*   MCHC  33.3*    RDW  49.8   PLATELETCT  385   MPV  9.5     Recent Labs      12/16/18   0555   SODIUM  139   POTASSIUM  3.9   CHLORIDE  104   CO2  30   GLUCOSE  102*   BUN  8   CREATININE  0.53   CALCIUM  8.9                   Assessment/Plan  * Cervical myelopathy (HCC)   Assessment & Plan    S/P cervical laminectomy and fusion  Cervical collar     Anemia   Assessment & Plan    Fe low, will start supplementation  Recommend outpt colonoscopy due to iron deficiency anemia     Rash   Assessment & Plan    Left buttock vesicular lesions concerning for Herpes Zoster vs Herpes Simplex  Continue 7 day course of Valtrex     Vitamin D deficiency   Assessment & Plan    Vit D level 14  On supplementation     Edema   Assessment & Plan    Pt reports that at home she uses SCD's  Echo shows EF 60%  BLE Venous Duplex negative for DVT  Continue Lasix as tolerated by BP and renal function     Essential hypertension- (present on admission)   Assessment & Plan    Presently controlled with Lasix  Continue to monitor BP trends     History of gastric bypass- (present on admission)   Assessment & Plan    Monitor for electrolyte disturbances     Full Code

## 2018-12-17 NOTE — REHAB-COLLABORATIVE ONGOING IDT TEAM NOTE
Weekly Interdisciplinary Team Conference Note    Weekly Interdisciplinary Team Conference # 1  Date:  12/17/2018    Clinicians present and reporting at team conference include the following:   MD: Brice Harvey MD   RN:  Antoinette Young RN   PT:   Mindy Stein, PT, DPT  OT:  Gunnar Quigley, MS, OTR/L   ST:  Not Applicable  CM:  Briana Herzog RN, CCM  REC:  Luis Luque, CTRS  RT:  None  RPh:  Anthony Milligan [facility]  Other:   None  All reporting clinicians have a working knowledge of this patient's plan of care.    Targeted DC Date:  TBD     Medical    Patient Active Problem List    Diagnosis Date Noted   • Mild intermittent asthma without complication 04/25/2012     Priority: High   • Edema 12/13/2018   • Vitamin D deficiency 12/13/2018   • Rash 12/13/2018   • Anemia 12/13/2018   • Cervical myelopathy (HCC) 12/11/2018   • Left hip pain 09/18/2018   • Encounter for work capability assessment- FMLA PAPERWORK 08/29/2018   • DDD (degenerative disc disease), cervical- MOD-[facility] SPINE [facility]- dr brennan; needs laminectomy 12/5/2018 dr brennan 08/09/2018   • Primary osteoarthritis of left hip- dr nowak; left THR tbd feb, 2019 06/07/2018   • Obesity (BMI 30-39.9) 06/07/2018   • DDD (degenerative disc disease), lumbar 04/25/2018   • Uncomplicated opioid dependence (CMS-HCC)- kylah adkins 01/31/2018   • Essential hypertension 01/31/2018   • Venous insufficiency 10/03/2017   • Colon cancer screening- needs 08/09/2017   • Chronic bilateral low back pain with bilateral sciatica- pain mgt 07/26/2017   • Vitamin B 12 deficiency 06/02/2016   • Fibroids 08/13/2013   • History of gastric bypass 04/25/2012     Results     ** No results found for the last 24 hours. **           Nursing  Diet/Nutrition:  Regular, Dysphagia II and Thin Liquids  Medication Administration:  Other patient prefers pain meds and xanax to be given whole while the rest are to be crushed in pudding  % consumed at meals in last 24 hours:  Consumed % of meals per  documentation.  No data found.    Snack schedule:  HS  Supplement:    Appetite:  Good  Fluid Intake/Output in past 24 hours: In: 240 [P.O.:240]  Out: -   Admit Weight:  Weight: 84.4 kg (186 lb)  Weight Last 7 Days   [84.4 kg (186 lb)] 84.4 kg (186 lb) (12/11 1140)    Pain Issues:    Location:  Back;Neck;Shoulder;Hand (12/16 2000)  Right;Left (12/16 2000)         Severity:  Severe   Scheduled pain medications:  gabapentin (NEURONTIN)  and morphine SR (MS CONTIN)      PRN pain medications used in last 24 hours:  oxycodone immediate release (ROXICODONE)    Non Pharmacologic Interventions:  repositioned and rest  Effectiveness of pain management plan:  fair=improved comfort with interventions but does not always meet goal    Bowel:    Bowel Assist Initial Score:  1 - Total Assistance  Bowel Assist Current Score:  1 - Total Assistance  Bowl Accidents in last 7 days:     Last bowel movement: 12/17/18  Stool Description: Small, Brown     Usual bowel pattern:  every other day  Scheduled bowel medications:  docusate sodium (COLACE) and senna-docusate (PERICOLACE or SENOKOT S)   PRN bowel medications used in last 24 hours:  None  Nursing Interventions:  Increased time, PRN medication, Scheduled medication, Supervision, Verbal cueing, Set-up, Emptying of device, Commode, Positioning on commode/toilet, Digital stimulation, Suppository  Effectiveness of bowel program:   fair=sometimes needs prn bowel meds for constipation  Bladder:    Bladder Assist Initial Score:  1 - Total Assistance  Bladder Assist Current Score:  1 - Total Assistance  Bladder Accidents in last 7 days:  4  PVR range for past 24-48 hours: -- ()  Intermittent Catheterization:  n/a  Medications affecting bladder:  None    Time void schedule/voiding pattern:  Voiding every 2-4 hours  Interventions:  Increased time, Verbal cueing, Brief, Requires 2 people to assist, Commode  Effectiveness of bladder training:  Fair=occasional unpredictable, incontinent emptying of  bladder (< 1 time/day)    Su:    Type:     Patient Lines/Drains/Airways Status    Active Catheter     None              Date placed:          Justification:    Diabetes:  Blood Sugar Frequency:  None    Range of BS for last 48 hours:       Scheduled diabetic medications:  None  Sliding scale usage in past 24 hours:  No  Total Short acting insulin in the past 24 hours:  None  Total Long acting insulin in the past 24 hours:  NPH n/a    Wound:         Patient Lines/Drains/Airways Status    Active Current Wounds     None                   Interventions:  Open to air monitoring   Effectiveness of intervention:  wound is improving     Wound Vac Location:  Not applicable  Dressing last changed:  Not applicable  Pump Mode Pressure Setting       Description of drainage:  Not applicable    Sleep/wake cycle:   Average hours slept:  Sleeps 4-6 hours without waking  Sleep medication usage:  None    Patient/Family Training/Education:  Fall Prevention and Pain Management  Discharge Supply Recommendations:  Wound Care Supplies  Strengths: Alert and oriented   Barriers:   Pain poorly managed            Nursing Problems           Problem: Bowel/Gastric:     Goal: Normal bowel function is maintained or improved     Flowsheet:     Taken at 12/11/18 1210    Last BM 12/08/18 by Kelsie Tucker R.N. Comment:  per report                Goal: Will not experience complications related to bowel motility             Problem: Communication     Goal: The ability to communicate needs accurately and effectively will improve             Problem: Discharge Barriers/Planning     Goal: Patient's continuum of care needs will be met             Problem: Infection     Goal: Will remain free from infection             Problem: Knowledge Deficit     Goal: Knowledge of disease process/condition, treatment plan, diagnostic tests, and medications will improve           Goal: Knowledge of the prescribed therapeutic regimen will improve             Problem:  Mobility     Goal: Risk for activity intolerance will decrease             Problem: Pain Management     Goal: Pain level will decrease to patient's comfort goal     Flowsheet:     Taken at 12/12/18 0556    Pain Scale 0 - 10  8 by Renu Huizar R.N.    Non Verbal Scale  Calm by Renu Huizar R.N.    Comfort Goal Comfort with Movement;Perform Activity;Sleep Comfortably by Renu Huizar R.N.                  Problem: Respiratory:     Goal: Respiratory status will improve             Problem: Safety     Goal: Will remain free from injury           Goal: Will remain free from falls             Problem: Skin Integrity     Goal: Risk for impaired skin integrity will decrease             Problem: Urinary Elimination:     Goal: Ability to reestablish a normal urinary elimination pattern will improve             Problem: Venous Thromboembolism (VTW)/Deep Vein Thrombosis (DVT) Prevention:     Goal: Patient will participate in Venous Thrombosis (VTE)/Deep Vein Thrombosis (DVT)Prevention Measures                  Long Term Goals:   At discharge patient will be able to functon safely and independently at home and in the community.           Section completed by:  Nidia Christine R.N.              Mobility  Bed mobility:  2  Bed /Chair/Wheelchair Transfer Initial:  1 - Total Assistance  Bed /Chair/Wheelchair Transfer Current:  2 - Max Assistance   Bed/Chair/Wheelchair Transfer Description:  Increased time, Set-up of equipment, Initial preparation for task, Assist with two limbs (Pt. needed Max. assist. with Pt. standing with FWW to/from partial supine position on mat table.)  Walk Initial:  1 - Total Assistance  Walk Current:  1 - Total Assistance   Walk Description:  Extra time, Safety concerns, Requires assist to advance foot (Pt. amb. in // bars with min./max. assist. of 1 x 7 feet x 2.)  Wheelchair Initial:  0 - Not tested,medical condition  Wheelchair Current:  1 - Total Assistance   Wheelchair  Description:   (3-5 ft x5 with R LE and L UE)  Stairs Initial:  0 - Not tested,medical condition  Stairs Current: 0 - Not tested,medical condition   Stairs Description:    Patient/Family Training/Education:  Conservative pain physiology/ pt not receptive, SPT, AMB in // bars, supine therex program  DME/DC Recommendations:  SNF  Strengths:  Able to follow instructions and Good carryover of learning  Barriers:   Home accessibility, Pain poorly managed, Poor activity tolerance, Poor balance, Poor insight/denial of deficits and Lack of motivation, resistance to conservative pain management education   # of short term goals set= 5  # of short term goals met= 0       Physical Therapy Problems           Problem: Mobility     Dates: Start: 12/12/18       Goal: STG-Within one week, patient will propel wheelchair community     Dates: Start: 12/12/18       Description: 1) Individualized goal: 50ft x 1, SPV  2) Interventions:  PT Group Therapy, PT E Stim Attended, PT Gait Training, PT Therapeutic Exercises, PT Neuro Re-Ed/Balance, PT Therapeutic Activity, PT Manual Therapy and PT Evaluation.           Goal: STG-Within one week, patient will ambulate household distance     Dates: Start: 12/12/18       Description: 1) Individualized goal:  25ft x 1, FWW, CGA  2) Interventions:  PT Group Therapy, PT E Stim Attended, PT Gait Training, PT Therapeutic Exercises, PT Neuro Re-Ed/Balance, PT Therapeutic Activity, PT Manual Therapy and PT Evaluation.           Goal: STG-Within one week, patient will ascend and descend four to six stairs     Dates: Start: 12/12/18       Description: 1) Individualized goal:  3 x 1 stairs with unilateral HR, mod A  2) Interventions:  PT Group Therapy, PT E Stim Attended, PT Gait Training, PT Therapeutic Exercises, PT Neuro Re-Ed/Balance, PT Therapeutic Activity, PT Manual Therapy and PT Evaluation.             Problem: Mobility Transfers     Dates: Start: 12/12/18       Goal: STG-Within one week, patient  "will perform bed mobility     Dates: Start: 12/12/18       Description: 1) Individualized goal:  [place] x 1 with AD as needed  2) Interventions:  PT Group Therapy, PT E Stim Attended, PT Gait Training, PT Therapeutic Exercises, PT Neuro Re-Ed/Balance, PT Therapeutic Activity, PT Manual Therapy and PT Evaluation.             Goal: STG-Within one week, patient will transfer bed to chair     Dates: Start: 12/12/18       Description: 1) Individualized goal:  Pt will transfer bed to chair with FWW, SBA  2) Interventions:  PT Group Therapy, PT E Stim Attended, PT Gait Training, PT Therapeutic Exercises, PT Neuro Re-Ed/Balance, PT Therapeutic Activity, PT Manual Therapy and PT Evaluation.               Problem: PT-Long Term Goals     Dates: Start: 12/12/18       Goal: LTG-By discharge, patient will ambulate     Dates: Start: 12/12/18       Description: 1) Individualized goal:  150ft x 1, FWW, SPV  2) Interventions:  PT Group Therapy, PT E Stim Attended, PT Gait Training, PT Therapeutic Exercises, PT Neuro Re-Ed/Balance, PT Therapeutic Activity, PT Manual Therapy and PT Evaluation.             Goal: LTG-By discharge, patient will perform home exercise program     Dates: Start: 12/12/18       Description: 1) Individualized goal:Pt will perform HEP for B LE strengthening, mod I  2) Interventions:  PT Group Therapy, PT E Stim Attended, PT Gait Training, PT Therapeutic Exercises, PT Neuro Re-Ed/Balance, PT Therapeutic Activity, PT Manual Therapy and PT Evaluation.             Goal: LTG-By discharge, patient will ambulate up/down 4-6 stairs     Dates: Start: 12/12/18       Description: 1) Individualized goal:  4x1 6\" steps, unilateral HR, SBA  2) Interventions:  PT Group Therapy, PT E Stim Attended, PT Gait Training, PT Therapeutic Exercises, PT Neuro Re-Ed/Balance, PT Therapeutic Activity, PT Manual Therapy and PT Evaluation.             Goal: LTG-By discharge, patient will transfer in/out of a car     Dates: Start: 12/12/18    "    Description: 1) Individualized goal: Pt will transfer in/out of a car with FWW and SBA  2) Interventions:  PT Group Therapy, PT E Stim Attended, PT Gait Training, PT Therapeutic Exercises, PT Neuro Re-Ed/Balance, PT Therapeutic Activity, PT Manual Therapy and PT Evaluation.                     Section completed by:  Mindy Stein, PT       Activities of Daily Living  Eating Initial:  1 - Total Assistance  Eating Current:  5 - Standby Prompting/Supervision or Set-up   Eating Description:  Set-up of equipment or meal/tube feeding (setup to open packaging and spread butter)  Grooming Initial:  0 - Not tested, medical condition  Grooming Current:  4 - Minimal Assistance   Grooming Description:  Set-up of equipment, Supervision for safety (setup and sba to wash/dry hands after toileting)  Bathing Initial:  0 - Not tested, medical condition  Bathing Current:  1 - Total Assistance   Bathing Description:  Adaptive equipment, Hand held shower, Increased time, Supervision for safety, Verbal cueing, Set-up of equipment, Initial preparation for task (assist w/ 8/10 tasks)  Upper Body Dressing Initial:  1 - Total Assistance  Upper Body Dressing Current:  1 - Total Assistance   Upper Body Dressing Description:  Verbal cueing, Set-up of equipment, Increased time, Initial preparation for task  Lower Body Dressing Initial:  1 - Total Assistance  Lower Body Dressing Current:  1 - Total Assistance   Lower Body Dressing Description:  1 - Total Assistance  Toileting Initial:  1 - Total Assistance  Toileting Current:  1 - Total Assistance   Toileting Description:  Grab bar (total assist for 3/3, pt attempts hygiene but unable to complete without assistance)  Toilet Transfer Initial:  1 - Total Assistance  Toilet Transfer Current:  3 - Moderate Assistance   Toilet Transfer Description:  3 - Moderate Assistance  Tub / Shower Transfer Initial:  0 - Not tested,medical condition  Tub / Shower Transfer Current:  3 - Moderate  Assistance   Tub / Shower Transfer Description:  Grab bar, Adaptive equipment, Supervision for safety, Verbal cueing, Set-up of equipment, Initial preparation for task (CGA with grab bar and BSC with cues)  IADL:  Not assessed  Family Training/Education: None  DME/DC Recommendations:  Recommend SNF for longer term rehab and due to requiring extensive assistance with ADLs, transfers and mobilty and not having much support at home (patient states S.O. Will be going to [place] to care for his mother)    Strengths:  Alert and oriented and Pleasant and cooperative  Barriers:  Decreased endurance, Generalized weakness, Home accessibility, Limited mobility, Pain poorly managed, Poor activity tolerance, Poor balance, Poor carryover of learning, Lack of motivation and Other: poor initiation for requesting help out of bed/getting dressed/eating meals; is frequently in bed, left hip pain, impaired UE use     # of short term goals set= 3   # of short term goals met= 0          Occupational Therapy Goals           Problem: Bathing     Dates: Start: 12/12/18       Goal: STG-Within one week, patient will bathe     Dates: Start: 12/12/18       Description: 1) Individualized Goal:  Body with max A using AE/AD/techniques as needed.  2) Interventions:  OT E Stim Attended, OT Self Care/ADL, OT Community Reintegration, OT Manual Ther Technique, OT Neuro Re-Ed/Balance, OT Sensory Int Techniques, OT Therapeutic Activity, OT Aquatic Therapy, OT Evaluation and OT Therapeutic Exercise     Note:     Goal Note filed on 12/17/18 1146 by Gunnar Quigley, MS,OTR/L    Goal: STG-Within one week, patient will bathe  Outcome: NOT MET  Total assist                  Problem: Dressing     Dates: Start: 12/12/18       Goal: STG-Within one week, patient will dress LB     Dates: Start: 12/12/18       Description: 1) Individualized Goal:  With max A using AE/AD/techniques as needed.  2) Interventions:  OT E Stim Attended, OT Self Care/ADL, OT Community  Reintegration, OT Manual Ther Technique, OT Neuro Re-Ed/Balance, OT Sensory Int Techniques, OT Therapeutic Activity, OT Aquatic Therapy, OT Evaluation and OT Therapeutic Exercise     Note:     Goal Note filed on 12/17/18 1146 by Gunnar Quigley MS,OTR/L    Goal: STG-Within one week, patient will dress LB  Outcome: NOT MET  Total assist                  Problem: Grooming     Dates: Start: 12/12/18       Goal: STG-Within one week, patient will complete grooming     Dates: Start: 12/12/18       Description: 1) Individualized Goal:  With setup and SBA with verbal cues while seated.  2) Interventions:  OT E Stim Attended, OT Self Care/ADL, OT Community Reintegration, OT Manual Ther Technique, OT Neuro Re-Ed/Balance, OT Sensory Int Techniques, OT Therapeutic Activity, OT Aquatic Therapy, OT Evaluation and OT Therapeutic Exercise       Note:     Goal Note filed on 12/17/18 1146 by Gunnar Quigley MS,OTR/L    Goal: STG-Within one week, patient will complete grooming  Outcome: NOT MET  Min A                  Problem: OT Long Term Goals     Dates: Start: 12/12/18       Goal: LTG-By discharge, patient will complete basic self care tasks     Dates: Start: 12/12/18       Description: 1) Individualized Goal:  With setup and supervision using AE/AD/techniques as needed.  2) Interventions:  OT E Stim Attended, OT Self Care/ADL, OT Community Reintegration, OT Manual Ther Technique, OT Neuro Re-Ed/Balance, OT Sensory Int Techniques, OT Therapeutic Activity, OT Aquatic Therapy, OT Evaluation and OT Therapeutic Exercise           Goal: LTG-By discharge, patient will perform bathroom transfers     Dates: Start: 12/12/18       Description: 1) Individualized Goal:  With setup and supervision using AE/AD/techniques as needed.  2) Interventions:  OT E Stim Attended, OT Self Care/ADL, OT Community Reintegration, OT Manual Ther Technique, OT Neuro Re-Ed/Balance, OT Sensory Int Techniques, OT Therapeutic Activity, OT Aquatic Therapy, OT  Evaluation and OT Therapeutic Exercise                 Section completed by:  Gunnar Quigley, MS,OTR/L             REHAB-Pharmacy IDT Team Note by Anthony Milligan RPH at 12/14/2018  2:25 PM  Version 1 of 1    Author:  Anthony Milligan RPH Service:  (none) Author Type:  Pharmacist    Filed:  12/14/2018  2:27 PM Date of Service:  12/14/2018  2:25 PM Status:  Signed    :  Anthony Milligan RPH (Pharmacist)         Pharmacy   Pharmacy  Antibiotics/Antifungals/Antivirals:  Medication:      Active Orders     Ordered     Ordering Provider       Wed Dec 12, 2018  5:07 PM    12/12/18 1707  valACYclovir (VALTREX) caplet 1,000 mg  3 TIMES DAILY      Mere Wade M.D.        Route:         po  Stop Date:  12/18/19  Reason: Left buttock vesicular lesions concerning for Herpes Zoster vs Herpes Simplex  Antihypertensives/Cardiac:  Medication:    Orders (72h ago through future)    Start     Ordered    12/12/18 0900  furosemide (LASIX) tablet 40 mg  DAILY      12/11/18 1132    12/11/18 1132  hydrALAZINE (APRESOLINE) tablet 25 mg  EVERY 8 HOURS PRN      12/11/18 1133        Patient Vitals for the past 24 hrs:   BP Pulse   12/14/18 1405 (!) 96/61 (!) 109   12/14/18 0647 120/87 (!) 110   12/13/18 1955 131/87 (!) 113       Anticoagulation:  Medication: Lovenox    Other key medications: A review of the medication list reveals no issues at this time.    Section completed by: Anthony Milligan [facility][AW.1]     Attribution Key     AW.1 - Anthony Milligan RPH on 12/14/2018  2:25 PM                    DC Planning  DC destination/dispostion:  Patient lives alone in a single level apartment.    DC Needs:  She has a fww, w/c, spc and tub bench.  She also has sequential compression device.  She will need home health most likely at discharge.  Her significant other is supportive but lives in [place].  She has supportive friends.  We have completed paperwork for her disability insurance and this has been faxed.    Barriers to discharge:   Lives  alone.  Patient is very distracted and has poor focus.    Strengths:     Section completed by:  Briana Herzog R.N.      Physician Summary  Brice Harvey MD participated and led team conference discussion.

## 2018-12-17 NOTE — PROGRESS NOTES
"Rehab Progress Note     Encounter date: 12/17/2018  Today I met with the patient face to face in her room    Chief Complaint:  Cervical myelopathy (HCC) , ongoing sensitivity in the lower limbs, right arm weakness    Interval Events (subjective)  Karine Ovalle is doing well today.  She reports ongoing weakness in the right upper limb.  She also reports sensitivity in the lower limbs.  She believes the edema in her lower limbs is significantly improving.  She is pleased with the progress.  She continues to be frustrated by the right hand weakness.  We discussed neurologic recovery and potential for improvement.  She had questions about opiates after discharge.  I discussed with her the need to follow-up with her outpatient pain provider and that medications would not be provided in violation of her pain contract.    Objective:  VITAL SIGNS: /75   Pulse 96   Temp 36.5 °C (97.7 °F) (Temporal)   Resp 18   Ht 1.6 m (5' 3\")   Wt 84.4 kg (186 lb)   LMP  (LMP Unknown)   SpO2 95%   Breastfeeding? No   BMI 32.95 kg/m²     Recent Results (from the past 72 hour(s))   CBC WITHOUT DIFFERENTIAL    Collection Time: 12/16/18  5:55 AM   Result Value Ref Range    WBC 6.2 4.8 - 10.8 K/uL    RBC 3.47 (L) 4.20 - 5.40 M/uL    Hemoglobin 9.0 (L) 12.0 - 16.0 g/dL    Hematocrit 27.0 (L) 37.0 - 47.0 %    MCV 77.8 (L) 81.4 - 97.8 fL    MCH 25.9 (L) 27.0 - 33.0 pg    MCHC 33.3 (L) 33.6 - 35.0 g/dL    RDW 49.8 35.9 - 50.0 fL    Platelet Count 385 164 - 446 K/uL    MPV 9.5 9.0 - 12.9 fL   BASIC METABOLIC PANEL    Collection Time: 12/16/18  5:55 AM   Result Value Ref Range    Sodium 139 135 - 145 mmol/L    Potassium 3.9 3.6 - 5.5 mmol/L    Chloride 104 96 - 112 mmol/L    Co2 30 20 - 33 mmol/L    Glucose 102 (H) 65 - 99 mg/dL    Bun 8 8 - 22 mg/dL    Creatinine 0.53 0.50 - 1.40 mg/dL    Calcium 8.9 8.5 - 10.5 mg/dL    Anion Gap 5.0 0.0 - 11.9   IRON/TOTAL IRON BIND    Collection Time: 12/16/18  5:55 AM   Result Value Ref Range    Iron " 27 (L) 40 - 170 ug/dL    Total Iron Binding 389 250 - 450 ug/dL    % Saturation 7 (L) 15 - 55 %   ESTIMATED GFR    Collection Time: 12/16/18  5:55 AM   Result Value Ref Range    GFR If African American >60 >60 mL/min/1.73 m 2    GFR If Non African American >60 >60 mL/min/1.73 m 2       Current Facility-Administered Medications   Medication Frequency   • methocarbamol (ROBAXIN) tablet 1,000 mg 4X/DAY   • ferrous sulfate tablet 325 mg QDAY with Breakfast   • ascorbic acid tablet 500 mg QDAY with Breakfast   • morphine ER (MS CONTIN) tablet 30 mg BID   • neomycin sulf/polymyx B sulf/HC soln (CORTISPORIN HC SOL) 3.5-88728-5 otic solution 4 Drop PRN   • bisacodyl (DULCOLAX) suppository 10 mg DAILY   • polyethylene glycol/lytes (MIRALAX) PACKET 1 Packet QDAY PRN   • magnesium hydroxide (MILK OF MAGNESIA) suspension 30 mL QDAY PRN   • bisacodyl (DULCOLAX) suppository 10 mg QDAY PRN   • valACYclovir (VALTREX) caplet 1,000 mg TID   • Respiratory Care per Protocol Continuous RT   • Pharmacy Consult Request ...Pain Management Review 1 Each PRN   • acetaminophen (TYLENOL) tablet 1,000 mg Q6HRS   • oxyCODONE immediate-release (ROXICODONE) tablet 5 mg Q3HRS PRN   • oxyCODONE immediate release (ROXICODONE) tablet 10 mg Q3HRS PRN   • hydrALAZINE (APRESOLINE) tablet 25 mg Q8HRS PRN   • artificial tears 1.4 % ophthalmic solution 1 Drop PRN   • benzocaine-menthol (CEPACOL) lozenge 1 Lozenge Q2HRS PRN   • mag hydrox-al hydrox-simeth (MAALOX PLUS ES or MYLANTA DS) suspension 20 mL Q2HRS PRN   • ondansetron (ZOFRAN ODT) dispertab 4 mg 4X/DAY PRN    Or   • ondansetron (ZOFRAN) syringe/vial injection 4 mg 4X/DAY PRN   • traZODone (DESYREL) tablet 50 mg QHS PRN   • sodium chloride (OCEAN) 0.65 % nasal spray 2 Spray PRN   • enoxaparin (LOVENOX) inj 40 mg DAILY   • ALPRAZolam (XANAX) tablet 0.5 mg TID PRN   • calcium carbonate (TUMS) chewable tab 500 mg BID   • docusate sodium (COLACE) capsule 100 mg BID   • promethazine (PHENERGAN) tablet  12.5-25 mg Q4HRS PRN   • vitamin D (cholecalciferol) tablet 5,000 Units DAILY   • cyanocobalamin (VITAMIN B12) tablet 1,000 mcg DAILY   • furosemide (LASIX) tablet 40 mg DAILY   • gabapentin (NEURONTIN) capsule 300 mg TID   • potassium chloride SA (Kdur) tablet 20 mEq DAILY   • normal saline PF 60 mL Q12HRS   • senna-docusate (PERICOLACE or SENOKOT S) 8.6-50 MG per tablet 1 Tab BID   • omeprazole (PRILOSEC) capsule 20 mg DAILY       Exam Date: 12/17/2018    General:  Awake, alert, oriented, no acute distress  HEENT:  Wearing Aspen cervical collar  Cardiac: regular rate and rhythm.  Rate of approximately 90  Lungs: clear to auscultation bilaterally.   Abdomen: soft; non tender, non distended, bowel sounds present and normoactive  Extremities: 2+ edema in the bilateral lower limbs  Neuro:   Some allodynia present in the bilateral lower limbs  Right elbow flexor 2/5, right elbow extensor 3/5, right finger extensor/flexor/abductor 3/5, wrist extensor 3/5    Elbow flexor function was variable and appeared to have some functional features.  outside of manual muscle testing, she appeared to have antigravity strength in the elbow flexor    Orders Placed This Encounter   Procedures   • Diet Order Regular (all meats chopped, sandwiches cut in 1/4)     Standing Status:   Standing     Number of Occurrences:   1     Order Specific Question:   Diet:     Answer:   Regular [1]     Comments:   all meats chopped, sandwiches cut in 1/4     Order Specific Question:   Consistency/Fluid modifications:     Answer:   Thin Liquids [3]       Assessment:  Active Hospital Problems    Diagnosis   • *Cervical myelopathy (HCC)   • Edema   • Vitamin D deficiency   • Rash   • Anemia   • Left hip pain   • Obesity (BMI 30-39.9)   • Primary osteoarthritis of left hip- dr nowak; left THR tbd feb, 2019   • Essential hypertension   • Vitamin B 12 deficiency   • History of gastric bypass       Medical Decision Making and Plan:  Karine Ovalle is a  53-year-old female admitted on December 11 for cervical myelopathy      Cervical Myelopathy/C4 AIS C nontraumatic SCI  Status post 2 stage cervical decompression by Dr. Mendoza with ACDF C3-6 on 12/5/18 and C3-C5 laminectomies with C3-T1 posterior fusion on 12/7/18   Continue comprehensive rehabilitation  Continue cervical collar at all times with brief removal for shower and meals    Chronic pain  Postoperative pain  Tylenol 1000 mg every 6 hours  Gabapentin 300 mg 3 times a day  Robaxin 1000 mg 4 times a day  MS Contin 30 mg twice a day  Oxycodone 5-10 mg every 3 hours as needed    Patient did not tolerate oxycodone weaning  She will need to follow-up with her outpatient pain provider for discharge prescriptions    She utilized 5 doses of oxycodone yesterday     Dysphagia --resolved  Speech and language pathology advanced diet to regular with thins     Neurogenic bladder  History of incontinence and numbness prior to surgery  Ongoing incontinence  Significant urgency  Continue to monitor and initiate oxybutynin if needed     Neurogenic Bowel  Opioid-induced constipation  Dulcolax suppository daily  Colace 100 mg twice a day  Milk of magnesia as needed  MiraLAX as needed  Milagro-Colace 1 tab twice daily    Lower limb edema  Hypokalemia  Appreciate hospitalist assistance  Lasix 40 mg daily  Potassium chloride 20 mEq daily    Anemia   Continue B12 and iron  Hemoglobin of 9.0 on December 16  Continue to monitor     Left buttock lesion  This was present on admission  Hospitalist believes likely herpes simplex versus HPV  Valacyclovir until December 19     Vitamin B12 deficiency  Vitamin D deficiency  Continue B12 supplementation  Vitamin D was 14 on admission, so continue supplementation with 5000 units daily     DVT prophylaxis  40 mg Lovenox daily     Estimated discharge: She will need skilled nursing due to slow progress, lack of family support      IDT Team Conference 12/17/2018  I individually evaluated the patient  today.  In addition to my daily follow up visit, I was present for and led the interdisciplinary team conference on 12/17/2018 and agree with the interdisciplinary conference documentation and plan of care.     RN: She is having bowel and bladder incontinence.  This is refusing the bowel program.  The incontinence seems to be due to calling for help early enough.  She is self limiting.  She displays pain behaviors and is adamant about early medication timing.  She is refusing to use the toilet and is reliant on the bedside commode.     PT:  She is self limiting.  She is perseverative on pain.  Her function varies with participation.  She is showing some dependent features.  Her lack of family support is a barrier.  She is making limited progress     OT:  She is requiring significant ADL assistance.  Her motivation and initiation are limiting.  She is dependent on cuing to perform basic daily routines.  She is disorganized.  She has poor carryover of learning.  Pain is limiting.     Total time:  35 minutes.  I spent greater than 50% of the time for patient care, counseling, and coordination on this date, including unit/floor time, and face-to-face time with the patient as per interval events and assessment and plan above. Topics discussed included functional progress, pain management, neurologic recovery, outpatient pain provider, edema      Brice Harvey M.D.  12/17/2018

## 2018-12-17 NOTE — PROGRESS NOTES
Hospital Medicine Daily Progress Note        Chief Complaint  Edema    Interval Problem Update  Leg swelling better.    Review of Systems  Review of Systems   Constitutional: Negative for chills and fever.   HENT: Negative.    Eyes: Negative.    Respiratory: Negative for cough and shortness of breath.    Cardiovascular: Positive for leg swelling. Negative for chest pain and palpitations.   Gastrointestinal: Negative for abdominal pain, nausea and vomiting.   Musculoskeletal: Positive for neck pain.        Wound pain   Skin: Positive for rash.   Neurological: Positive for focal weakness.        Physical Exam  Temp:  [36.5 °C (97.7 °F)-36.8 °C (98.3 °F)] 36.5 °C (97.7 °F)  Pulse:  [] 96  Resp:  [18] 18  BP: (110-114)/(65-75) 110/75    Physical Exam   Constitutional: She is oriented to person, place, and time. No distress.   HENT:   Head: Normocephalic and atraumatic.   Right Ear: External ear normal.   Left Ear: External ear normal.   Eyes: Conjunctivae and EOM are normal. Right eye exhibits no discharge. Left eye exhibits no discharge.   Neck:   C-collar   Cardiovascular: Normal rate and regular rhythm.    Pulmonary/Chest: Effort normal and breath sounds normal. No stridor. No respiratory distress. She has no wheezes.   Abdominal: Soft. Bowel sounds are normal. She exhibits no distension. There is no tenderness. There is no rebound and no guarding.   Musculoskeletal: She exhibits edema.   2+/3+ edema LLE, 2+ RLE   Neurological: She is alert and oriented to person, place, and time.   Skin: Skin is warm and dry. She is not diaphoretic.   Vitals reviewed.      Fluids  No intake or output data in the 24 hours ending 12/17/18 1245    Laboratory  Recent Labs      12/16/18   0555   WBC  6.2   RBC  3.47*   HEMOGLOBIN  9.0*   HEMATOCRIT  27.0*   MCV  77.8*   MCH  25.9*   MCHC  33.3*   RDW  49.8   PLATELETCT  385   MPV  9.5     Recent Labs      12/16/18   0555   SODIUM  139   POTASSIUM  3.9   CHLORIDE  104   CO2  30    GLUCOSE  102*   BUN  8   CREATININE  0.53   CALCIUM  8.9                   Assessment/Plan  * Cervical myelopathy (HCC)   Assessment & Plan    S/P cervical laminectomy and fusion  Cervical collar     Anemia   Assessment & Plan    Fe and Vit C supplementation started  Recommend outpt colonoscopy due to iron deficiency anemia     Rash   Assessment & Plan    Left buttock vesicular lesions concerning for Herpes Zoster vs Herpes Simplex  Complete 7 day course of Valtrex     Vitamin D deficiency   Assessment & Plan    Vit D level 14  On supplementation     Edema   Assessment & Plan    Pt reports that at home she uses SCD's  Echo shows EF 60%  BLE Venous Duplex negative for DVT  Continue Lasix as tolerated by BP and renal function     Essential hypertension- (present on admission)   Assessment & Plan    Presently controlled with Lasix  Continue to monitor BP trends     History of gastric bypass- (present on admission)   Assessment & Plan    Monitor for electrolyte disturbances     Full Code

## 2018-12-17 NOTE — DISCHARGE PLANNING
Case Management:  Met with patient and her boyfriend.  He is getting ready to go to [place] and will be out of the country for a period of time.  Patient has not been able to make much progress at this pace.  Team has recommended transition to skilled facility.  Patient indicates that she has been in [facility] and would like to return there.  I will refer and follow.  I have asked her to choose a back up facility in case needed.  Will follow.

## 2018-12-17 NOTE — REHAB-PT IDT TEAM NOTE
Physical Therapy   Mobility  Bed mobility:  2  Bed /Chair/Wheelchair Transfer Initial:  1 - Total Assistance  Bed /Chair/Wheelchair Transfer Current:  2 - Max Assistance   Bed/Chair/Wheelchair Transfer Description:  Increased time, Set-up of equipment, Initial preparation for task, Assist with two limbs (Pt. needed Max. assist. with Pt. standing with FWW to/from partial supine position on mat table.)  Walk Initial:  1 - Total Assistance  Walk Current:  1 - Total Assistance   Walk Description:  Extra time, Safety concerns, Requires assist to advance foot (Pt. amb. in // bars with min./max. assist. of 1 x 7 feet x 2.)  Wheelchair Initial:  0 - Not tested,medical condition  Wheelchair Current:  1 - Total Assistance   Wheelchair Description:   (3-5 ft x5 with R LE and L UE)  Stairs Initial:  0 - Not tested,medical condition  Stairs Current: 0 - Not tested,medical condition   Stairs Description:    Patient/Family Training/Education:  Conservative pain physiology/ pt not receptive, SPT, AMB in // bars, supine therex program  DME/DC Recommendations:  SNF  Strengths:  Able to follow instructions and Good carryover of learning  Barriers:   Home accessibility, Pain poorly managed, Poor activity tolerance, Poor balance, Poor insight/denial of deficits and Lack of motivation, resistance to conservative pain management education   # of short term goals set= 5  # of short term goals met= 0       Physical Therapy Problems           Problem: Mobility     Dates: Start: 12/12/18       Goal: STG-Within one week, patient will propel wheelchair community     Dates: Start: 12/12/18       Description: 1) Individualized goal: 50ft x 1, SPV  2) Interventions:  PT Group Therapy, PT E Stim Attended, PT Gait Training, PT Therapeutic Exercises, PT Neuro Re-Ed/Balance, PT Therapeutic Activity, PT Manual Therapy and PT Evaluation.           Goal: STG-Within one week, patient will ambulate household distance     Dates: Start: 12/12/18        Description: 1) Individualized goal:  25ft x 1, FWW, CGA  2) Interventions:  PT Group Therapy, PT E Stim Attended, PT Gait Training, PT Therapeutic Exercises, PT Neuro Re-Ed/Balance, PT Therapeutic Activity, PT Manual Therapy and PT Evaluation.           Goal: STG-Within one week, patient will ascend and descend four to six stairs     Dates: Start: 12/12/18       Description: 1) Individualized goal:  3 x 1 stairs with unilateral HR, mod A  2) Interventions:  PT Group Therapy, PT E Stim Attended, PT Gait Training, PT Therapeutic Exercises, PT Neuro Re-Ed/Balance, PT Therapeutic Activity, PT Manual Therapy and PT Evaluation.             Problem: Mobility Transfers     Dates: Start: 12/12/18       Goal: STG-Within one week, patient will perform bed mobility     Dates: Start: 12/12/18       Description: 1) Individualized goal:  [place] x 1 with AD as needed  2) Interventions:  PT Group Therapy, PT E Stim Attended, PT Gait Training, PT Therapeutic Exercises, PT Neuro Re-Ed/Balance, PT Therapeutic Activity, PT Manual Therapy and PT Evaluation.             Goal: STG-Within one week, patient will transfer bed to chair     Dates: Start: 12/12/18       Description: 1) Individualized goal:  Pt will transfer bed to chair with FWW, SBA  2) Interventions:  PT Group Therapy, PT E Stim Attended, PT Gait Training, PT Therapeutic Exercises, PT Neuro Re-Ed/Balance, PT Therapeutic Activity, PT Manual Therapy and PT Evaluation.               Problem: PT-Long Term Goals     Dates: Start: 12/12/18       Goal: LTG-By discharge, patient will ambulate     Dates: Start: 12/12/18       Description: 1) Individualized goal:  150ft x 1, FWW, SPV  2) Interventions:  PT Group Therapy, PT E Stim Attended, PT Gait Training, PT Therapeutic Exercises, PT Neuro Re-Ed/Balance, PT Therapeutic Activity, PT Manual Therapy and PT Evaluation.             Goal: LTG-By discharge, patient will perform home exercise program     Dates: Start: 12/12/18        "Description: 1) Individualized goal:Pt will perform HEP for B LE strengthening, mod I  2) Interventions:  PT Group Therapy, PT E Stim Attended, PT Gait Training, PT Therapeutic Exercises, PT Neuro Re-Ed/Balance, PT Therapeutic Activity, PT Manual Therapy and PT Evaluation.             Goal: LTG-By discharge, patient will ambulate up/down 4-6 stairs     Dates: Start: 12/12/18       Description: 1) Individualized goal:  4x1 6\" steps, unilateral HR, SBA  2) Interventions:  PT Group Therapy, PT E Stim Attended, PT Gait Training, PT Therapeutic Exercises, PT Neuro Re-Ed/Balance, PT Therapeutic Activity, PT Manual Therapy and PT Evaluation.             Goal: LTG-By discharge, patient will transfer in/out of a car     Dates: Start: 12/12/18       Description: 1) Individualized goal: Pt will transfer in/out of a car with FWW and SBA  2) Interventions:  PT Group Therapy, PT E Stim Attended, PT Gait Training, PT Therapeutic Exercises, PT Neuro Re-Ed/Balance, PT Therapeutic Activity, PT Manual Therapy and PT Evaluation.                     Section completed by:  Mindy Stein, PT     "

## 2018-12-17 NOTE — REHAB-NURSING IDT TEAM NOTE
Nursing   Nursing  Diet/Nutrition:  Regular, Dysphagia II and Thin Liquids  Medication Administration:  Other patient prefers pain meds and xanax to be given whole while the rest are to be crushed in pudding  % consumed at meals in last 24 hours:  Consumed % of meals per documentation.  No data found.    Snack schedule:  HS  Supplement:    Appetite:  Good  Fluid Intake/Output in past 24 hours: In: 240 [P.O.:240]  Out: -   Admit Weight:  Weight: 84.4 kg (186 lb)  Weight Last 7 Days   [84.4 kg (186 lb)] 84.4 kg (186 lb) (12/11 1140)    Pain Issues:    Location:  Back;Neck;Shoulder;Hand (12/16 2000)  Right;Left (12/16 2000)         Severity:  Severe   Scheduled pain medications:  gabapentin (NEURONTIN)  and morphine SR (MS CONTIN)      PRN pain medications used in last 24 hours:  oxycodone immediate release (ROXICODONE)    Non Pharmacologic Interventions:  repositioned and rest  Effectiveness of pain management plan:  fair=improved comfort with interventions but does not always meet goal    Bowel:    Bowel Assist Initial Score:  1 - Total Assistance  Bowel Assist Current Score:  1 - Total Assistance  Bowl Accidents in last 7 days:     Last bowel movement: 12/17/18  Stool Description: Small, Brown     Usual bowel pattern:  every other day  Scheduled bowel medications:  docusate sodium (COLACE) and senna-docusate (PERICOLACE or SENOKOT S)   PRN bowel medications used in last 24 hours:  None  Nursing Interventions:  Increased time, PRN medication, Scheduled medication, Supervision, Verbal cueing, Set-up, Emptying of device, Commode, Positioning on commode/toilet, Digital stimulation, Suppository  Effectiveness of bowel program:   fair=sometimes needs prn bowel meds for constipation  Bladder:    Bladder Assist Initial Score:  1 - Total Assistance  Bladder Assist Current Score:  1 - Total Assistance  Bladder Accidents in last 7 days:  4  PVR range for past 24-48 hours: -- ()  Intermittent Catheterization:   n/a  Medications affecting bladder:  None    Time void schedule/voiding pattern:  Voiding every 2-4 hours  Interventions:  Increased time, Verbal cueing, Brief, Requires 2 people to assist, Commode  Effectiveness of bladder training:  Fair=occasional unpredictable, incontinent emptying of bladder (< 1 time/day)    Su:    Type:     Patient Lines/Drains/Airways Status    Active Catheter     None              Date placed:          Justification:    Diabetes:  Blood Sugar Frequency:  None    Range of BS for last 48 hours:       Scheduled diabetic medications:  None  Sliding scale usage in past 24 hours:  No  Total Short acting insulin in the past 24 hours:  None  Total Long acting insulin in the past 24 hours:  NPH n/a    Wound:         Patient Lines/Drains/Airways Status    Active Current Wounds     None                   Interventions:  Open to air monitoring   Effectiveness of intervention:  wound is improving     Wound Vac Location:  Not applicable  Dressing last changed:  Not applicable  Pump Mode Pressure Setting       Description of drainage:  Not applicable    Sleep/wake cycle:   Average hours slept:  Sleeps 4-6 hours without waking  Sleep medication usage:  None    Patient/Family Training/Education:  Fall Prevention and Pain Management  Discharge Supply Recommendations:  Wound Care Supplies  Strengths: Alert and oriented   Barriers:   Pain poorly managed            Nursing Problems           Problem: Bowel/Gastric:     Goal: Normal bowel function is maintained or improved     Flowsheet:     Taken at 12/11/18 1210    Last BM 12/08/18 by Kelsie Tucker RDustinN. Comment:  per report                Goal: Will not experience complications related to bowel motility             Problem: Communication     Goal: The ability to communicate needs accurately and effectively will improve             Problem: Discharge Barriers/Planning     Goal: Patient's continuum of care needs will be met             Problem: Infection      Goal: Will remain free from infection             Problem: Knowledge Deficit     Goal: Knowledge of disease process/condition, treatment plan, diagnostic tests, and medications will improve           Goal: Knowledge of the prescribed therapeutic regimen will improve             Problem: Mobility     Goal: Risk for activity intolerance will decrease             Problem: Pain Management     Goal: Pain level will decrease to patient's comfort goal     Flowsheet:     Taken at 12/12/18 8945    Pain Scale 0 - 10  8 by Renu Huizar R.N.    Non Verbal Scale  Calm by Renu Huizar R.N.    Comfort Goal Comfort with Movement;Perform Activity;Sleep Comfortably by Renu Huizar R.N.                  Problem: Respiratory:     Goal: Respiratory status will improve             Problem: Safety     Goal: Will remain free from injury           Goal: Will remain free from falls             Problem: Skin Integrity     Goal: Risk for impaired skin integrity will decrease             Problem: Urinary Elimination:     Goal: Ability to reestablish a normal urinary elimination pattern will improve             Problem: Venous Thromboembolism (VTW)/Deep Vein Thrombosis (DVT) Prevention:     Goal: Patient will participate in Venous Thrombosis (VTE)/Deep Vein Thrombosis (DVT)Prevention Measures                  Long Term Goals:   At discharge patient will be able to functon safely and independently at home and in the community.           Section completed by:  Nidia Christine R.N.

## 2018-12-17 NOTE — REHAB-OT IDT TEAM NOTE
Occupational Therapy   Activities of Daily Living  Eating Initial:  1 - Total Assistance  Eating Current:  5 - Standby Prompting/Supervision or Set-up   Eating Description:  Set-up of equipment or meal/tube feeding (setup to open packaging and spread butter)  Grooming Initial:  0 - Not tested, medical condition  Grooming Current:  4 - Minimal Assistance   Grooming Description:  Set-up of equipment, Supervision for safety (setup and sba to wash/dry hands after toileting)  Bathing Initial:  0 - Not tested, medical condition  Bathing Current:  1 - Total Assistance   Bathing Description:  Adaptive equipment, Hand held shower, Increased time, Supervision for safety, Verbal cueing, Set-up of equipment, Initial preparation for task (assist w/ 8/10 tasks)  Upper Body Dressing Initial:  1 - Total Assistance  Upper Body Dressing Current:  1 - Total Assistance   Upper Body Dressing Description:  Verbal cueing, Set-up of equipment, Increased time, Initial preparation for task  Lower Body Dressing Initial:  1 - Total Assistance  Lower Body Dressing Current:  1 - Total Assistance   Lower Body Dressing Description:  1 - Total Assistance  Toileting Initial:  1 - Total Assistance  Toileting Current:  1 - Total Assistance   Toileting Description:  Grab bar (total assist for 3/3, pt attempts hygiene but unable to complete without assistance)  Toilet Transfer Initial:  1 - Total Assistance  Toilet Transfer Current:  3 - Moderate Assistance   Toilet Transfer Description:  3 - Moderate Assistance  Tub / Shower Transfer Initial:  0 - Not tested,medical condition  Tub / Shower Transfer Current:  3 - Moderate Assistance   Tub / Shower Transfer Description:  Grab bar, Adaptive equipment, Supervision for safety, Verbal cueing, Set-up of equipment, Initial preparation for task (CGA with grab bar and BSC with cues)  IADL:  Not assessed  Family Training/Education: None  DME/DC Recommendations:  Recommend SNF for longer term rehab and due to  requiring extensive assistance with ADLs, transfers and mobilty and not having much support at home (patient states S.O. Will be going to [place] to care for his mother)    Strengths:  Alert and oriented and Pleasant and cooperative  Barriers:  Decreased endurance, Generalized weakness, Home accessibility, Limited mobility, Pain poorly managed, Poor activity tolerance, Poor balance, Poor carryover of learning, Lack of motivation and Other: poor initiation for requesting help out of bed/getting dressed/eating meals; is frequently in bed, left hip pain, impaired UE use     # of short term goals set= 3   # of short term goals met= 0          Occupational Therapy Goals           Problem: Bathing     Dates: Start: 12/12/18       Goal: STG-Within one week, patient will bathe     Dates: Start: 12/12/18       Description: 1) Individualized Goal:  Body with max A using AE/AD/techniques as needed.  2) Interventions:  OT E Stim Attended, OT Self Care/ADL, OT Community Reintegration, OT Manual Ther Technique, OT Neuro Re-Ed/Balance, OT Sensory Int Techniques, OT Therapeutic Activity, OT Aquatic Therapy, OT Evaluation and OT Therapeutic Exercise     Note:     Goal Note filed on 12/17/18 1146 by Gunnar Quigley MS,OTR/L    Goal: STG-Within one week, patient will bathe  Outcome: NOT MET  Total assist                  Problem: Dressing     Dates: Start: 12/12/18       Goal: STG-Within one week, patient will dress LB     Dates: Start: 12/12/18       Description: 1) Individualized Goal:  With max A using AE/AD/techniques as needed.  2) Interventions:  OT E Stim Attended, OT Self Care/ADL, OT Community Reintegration, OT Manual Ther Technique, OT Neuro Re-Ed/Balance, OT Sensory Int Techniques, OT Therapeutic Activity, OT Aquatic Therapy, OT Evaluation and OT Therapeutic Exercise     Note:     Goal Note filed on 12/17/18 1146 by Gunnar Quigley MS,OTR/L    Goal: STG-Within one week, patient will dress LB  Outcome: NOT MET  Total  assist                  Problem: Grooming     Dates: Start: 12/12/18       Goal: STG-Within one week, patient will complete grooming     Dates: Start: 12/12/18       Description: 1) Individualized Goal:  With setup and SBA with verbal cues while seated.  2) Interventions:  OT E Stim Attended, OT Self Care/ADL, OT Community Reintegration, OT Manual Ther Technique, OT Neuro Re-Ed/Balance, OT Sensory Int Techniques, OT Therapeutic Activity, OT Aquatic Therapy, OT Evaluation and OT Therapeutic Exercise       Note:     Goal Note filed on 12/17/18 1146 by Gunnar Quigley MS,OTR/L    Goal: STG-Within one week, patient will complete grooming  Outcome: NOT MET  Min A                  Problem: OT Long Term Goals     Dates: Start: 12/12/18       Goal: LTG-By discharge, patient will complete basic self care tasks     Dates: Start: 12/12/18       Description: 1) Individualized Goal:  With setup and supervision using AE/AD/techniques as needed.  2) Interventions:  OT E Stim Attended, OT Self Care/ADL, OT Community Reintegration, OT Manual Ther Technique, OT Neuro Re-Ed/Balance, OT Sensory Int Techniques, OT Therapeutic Activity, OT Aquatic Therapy, OT Evaluation and OT Therapeutic Exercise           Goal: LTG-By discharge, patient will perform bathroom transfers     Dates: Start: 12/12/18       Description: 1) Individualized Goal:  With setup and supervision using AE/AD/techniques as needed.  2) Interventions:  OT E Stim Attended, OT Self Care/ADL, OT Community Reintegration, OT Manual Ther Technique, OT Neuro Re-Ed/Balance, OT Sensory Int Techniques, OT Therapeutic Activity, OT Aquatic Therapy, OT Evaluation and OT Therapeutic Exercise                 Section completed by:  Gunnar Quigley MS,OTR/L

## 2018-12-18 PROCEDURE — A9270 NON-COVERED ITEM OR SERVICE: HCPCS | Performed by: PHYSICAL MEDICINE & REHABILITATION

## 2018-12-18 PROCEDURE — 97530 THERAPEUTIC ACTIVITIES: CPT

## 2018-12-18 PROCEDURE — 700111 HCHG RX REV CODE 636 W/ 250 OVERRIDE (IP): Performed by: PHYSICAL MEDICINE & REHABILITATION

## 2018-12-18 PROCEDURE — 700112 HCHG RX REV CODE 229: Performed by: PHYSICAL MEDICINE & REHABILITATION

## 2018-12-18 PROCEDURE — 97110 THERAPEUTIC EXERCISES: CPT

## 2018-12-18 PROCEDURE — A9270 NON-COVERED ITEM OR SERVICE: HCPCS | Performed by: HOSPITALIST

## 2018-12-18 PROCEDURE — 700101 HCHG RX REV CODE 250: Performed by: PHYSICAL MEDICINE & REHABILITATION

## 2018-12-18 PROCEDURE — 99232 SBSQ HOSP IP/OBS MODERATE 35: CPT | Performed by: HOSPITALIST

## 2018-12-18 PROCEDURE — 700102 HCHG RX REV CODE 250 W/ 637 OVERRIDE(OP): Performed by: PHYSICAL MEDICINE & REHABILITATION

## 2018-12-18 PROCEDURE — 99232 SBSQ HOSP IP/OBS MODERATE 35: CPT | Performed by: PHYSICAL MEDICINE & REHABILITATION

## 2018-12-18 PROCEDURE — 700102 HCHG RX REV CODE 250 W/ 637 OVERRIDE(OP): Performed by: HOSPITALIST

## 2018-12-18 PROCEDURE — 770010 HCHG ROOM/CARE - REHAB SEMI PRIVAT*

## 2018-12-18 PROCEDURE — 97535 SELF CARE MNGMENT TRAINING: CPT

## 2018-12-18 RX ADMIN — ENOXAPARIN SODIUM 40 MG: 100 INJECTION SUBCUTANEOUS at 09:36

## 2018-12-18 RX ADMIN — GABAPENTIN 300 MG: 300 CAPSULE ORAL at 21:27

## 2018-12-18 RX ADMIN — POTASSIUM CHLORIDE 20 MEQ: 1500 TABLET, EXTENDED RELEASE ORAL at 09:37

## 2018-12-18 RX ADMIN — METHOCARBAMOL 1000 MG: 500 TABLET ORAL at 21:26

## 2018-12-18 RX ADMIN — OXYCODONE HYDROCHLORIDE 10 MG: 10 TABLET ORAL at 06:37

## 2018-12-18 RX ADMIN — OXYCODONE HYDROCHLORIDE 10 MG: 10 TABLET ORAL at 18:27

## 2018-12-18 RX ADMIN — DOCUSATE SODIUM 100 MG: 100 CAPSULE, LIQUID FILLED ORAL at 09:37

## 2018-12-18 RX ADMIN — FERROUS SULFATE TAB 325 MG (65 MG ELEMENTAL FE) 325 MG: 325 (65 FE) TAB at 09:37

## 2018-12-18 RX ADMIN — OXYCODONE HYDROCHLORIDE 10 MG: 10 TABLET ORAL at 21:28

## 2018-12-18 RX ADMIN — ALPRAZOLAM 0.5 MG: 0.5 TABLET ORAL at 21:26

## 2018-12-18 RX ADMIN — ACETAMINOPHEN 1000 MG: 500 TABLET, FILM COATED ORAL at 23:49

## 2018-12-18 RX ADMIN — OXYCODONE HYDROCHLORIDE 10 MG: 10 TABLET ORAL at 09:37

## 2018-12-18 RX ADMIN — Medication 60 ML: at 09:38

## 2018-12-18 RX ADMIN — METHOCARBAMOL 1000 MG: 500 TABLET ORAL at 15:45

## 2018-12-18 RX ADMIN — ACETAMINOPHEN 1000 MG: 500 TABLET, FILM COATED ORAL at 18:27

## 2018-12-18 RX ADMIN — OXYCODONE HYDROCHLORIDE AND ACETAMINOPHEN 500 MG: 500 TABLET ORAL at 09:37

## 2018-12-18 RX ADMIN — FUROSEMIDE 40 MG: 40 TABLET ORAL at 09:37

## 2018-12-18 RX ADMIN — OXYCODONE HYDROCHLORIDE 10 MG: 10 TABLET ORAL at 14:43

## 2018-12-18 RX ADMIN — MORPHINE SULFATE 30 MG: 30 TABLET, EXTENDED RELEASE ORAL at 18:27

## 2018-12-18 RX ADMIN — VALACYCLOVIR HYDROCHLORIDE 1000 MG: 500 TABLET, FILM COATED ORAL at 14:43

## 2018-12-18 RX ADMIN — ACETAMINOPHEN 1000 MG: 500 TABLET, FILM COATED ORAL at 12:10

## 2018-12-18 RX ADMIN — Medication 60 ML: at 21:39

## 2018-12-18 RX ADMIN — CYANOCOBALAMIN TAB 1000 MCG 1000 MCG: 1000 TAB at 09:37

## 2018-12-18 RX ADMIN — ACETAMINOPHEN 1000 MG: 500 TABLET, FILM COATED ORAL at 05:20

## 2018-12-18 RX ADMIN — GABAPENTIN 300 MG: 300 CAPSULE ORAL at 09:37

## 2018-12-18 RX ADMIN — MORPHINE SULFATE 30 MG: 30 TABLET, EXTENDED RELEASE ORAL at 05:20

## 2018-12-18 RX ADMIN — DOCUSATE SODIUM 100 MG: 100 CAPSULE, LIQUID FILLED ORAL at 21:27

## 2018-12-18 RX ADMIN — OMEPRAZOLE 20 MG: 20 CAPSULE, DELAYED RELEASE ORAL at 09:37

## 2018-12-18 RX ADMIN — METHOCARBAMOL 1000 MG: 500 TABLET ORAL at 05:20

## 2018-12-18 RX ADMIN — SENNOSIDES AND DOCUSATE SODIUM 1 TABLET: 8.6; 5 TABLET ORAL at 21:27

## 2018-12-18 RX ADMIN — VALACYCLOVIR HYDROCHLORIDE 1000 MG: 500 TABLET, FILM COATED ORAL at 21:27

## 2018-12-18 RX ADMIN — SENNOSIDES AND DOCUSATE SODIUM 1 TABLET: 8.6; 5 TABLET ORAL at 09:37

## 2018-12-18 RX ADMIN — GABAPENTIN 300 MG: 300 CAPSULE ORAL at 14:43

## 2018-12-18 RX ADMIN — METHOCARBAMOL 1000 MG: 500 TABLET ORAL at 12:10

## 2018-12-18 RX ADMIN — VALACYCLOVIR HYDROCHLORIDE 1000 MG: 500 TABLET, FILM COATED ORAL at 09:38

## 2018-12-18 RX ADMIN — CALCIUM CARBONATE (ANTACID) CHEW TAB 500 MG 500 MG: 500 CHEW TAB at 21:27

## 2018-12-18 RX ADMIN — CALCIUM CARBONATE (ANTACID) CHEW TAB 500 MG 500 MG: 500 CHEW TAB at 09:38

## 2018-12-18 RX ADMIN — VITAMIN D, TAB 1000IU (100/BT) 5000 UNITS: 25 TAB at 09:38

## 2018-12-18 ASSESSMENT — ENCOUNTER SYMPTOMS
VOMITING: 0
PALPITATIONS: 0
CHILLS: 0
FOCAL WEAKNESS: 1
COUGH: 0
SHORTNESS OF BREATH: 0
NECK PAIN: 1
FEVER: 0
ABDOMINAL PAIN: 0
NAUSEA: 0

## 2018-12-18 ASSESSMENT — PAIN SCALES - GENERAL
PAINLEVEL_OUTOF10: 8
PAINLEVEL_OUTOF10: 9

## 2018-12-18 NOTE — PROGRESS NOTES
[facility] Medicine Daily Progress Note        Chief Complaint  Edema    Interval Problem Update  12/17: Leg swelling better.  12/18: Waiting to go to the bathroom.     Review of Systems  Review of Systems   Constitutional: Negative for chills and fever.   Respiratory: Negative for cough and shortness of breath.    Cardiovascular: Positive for leg swelling. Negative for chest pain and palpitations.   Gastrointestinal: Negative for abdominal pain, nausea and vomiting.   Genitourinary: Positive for urgency.   Musculoskeletal: Positive for joint pain and neck pain.        Wound pain   Skin: Positive for rash.   Neurological: Positive for focal weakness.        Physical Exam  Temp:  [36.3 °C (97.3 °F)-36.6 °C (97.8 °F)] 36.3 °C (97.3 °F)  Pulse:  [92-99] 92  Resp:  [18] 18  BP: (106-119)/(68-83) 119/83    Physical Exam   Constitutional: She is oriented to person, place, and time. No distress.   HENT:   Head: Normocephalic and atraumatic.   Eyes: Conjunctivae are normal. Right eye exhibits no discharge. Left eye exhibits no discharge.   Neck:   C-collar   Cardiovascular: Normal rate and regular rhythm.    Pulmonary/Chest: Effort normal and breath sounds normal. No respiratory distress. She has no wheezes.   Abdominal: Soft. Bowel sounds are normal. She exhibits no distension. There is no tenderness. There is no rebound and no guarding.   Musculoskeletal: She exhibits edema.   2+/3+ edema LLE, 2+ RLE   Neurological: She is alert and oriented to person, place, and time.   Skin: Skin is warm and dry. She is not diaphoretic.   Vitals reviewed.      Fluids    Intake/Output Summary (Last 24 hours) at 12/18/18 1303  Last data filed at 12/17/18 2130   Gross per 24 hour   Intake              240 ml   Output              300 ml   Net              -60 ml       Laboratory  Recent Labs      12/16/18   0555   WBC  6.2   RBC  3.47*   HEMOGLOBIN  9.0*   HEMATOCRIT  27.0*   MCV  77.8*   MCH  25.9*   MCHC  33.3*   RDW  49.8   PLATELETCT   385   MPV  9.5     Recent Labs      12/16/18   0555   SODIUM  139   POTASSIUM  3.9   CHLORIDE  104   CO2  30   GLUCOSE  102*   BUN  8   CREATININE  0.53   CALCIUM  8.9                   Assessment/Plan  * Cervical myelopathy (HCC)   Assessment & Plan    S/P cervical laminectomy and fusion  Cervical collar     Anemia   Assessment & Plan    Fe and Vit C supplementation started  Recommend outpt colonoscopy due to iron deficiency anemia     Rash   Assessment & Plan    Left buttock vesicular lesions concerning for Herpes Zoster vs Herpes Simplex  Complete 7 day course of Valtrex     Vitamin D deficiency   Assessment & Plan    Vit D level 14  On supplementation     Edema   Assessment & Plan    Pt reports that at home she uses SCD's  Echo shows EF 60%  BLE Venous Duplex negative for DVT  Continue Lasix as tolerated by BP and renal function     Essential hypertension- (present on admission)   Assessment & Plan    Presently controlled with Lasix  Continue to monitor BP trends     History of gastric bypass- (present on admission)   Assessment & Plan    Monitor for electrolyte disturbances  Likely cause of B12 and D deficiency     Full Code

## 2018-12-18 NOTE — PROGRESS NOTES
"Rehab Progress Note     Encounter date: 12/18/2018  Today I met with the patient face to face in PT    Chief Complaint:  Cervical myelopathy (HCC) , discharge planning    Interval Events (subjective)  Karine Ovalle reports that she is doing better today.  She reports her pain is improved.  She is working harder in therapy today.  She reports that her hip osteoarthritis continues to be bothersome, but with conservative measures, she is participating in therapies today.  We discussed discharge planning.  We discussed the recommendation for skilled nursing facility discharge due to lack of support at home and slow progress.  She was agreeable to this, and she states that this would actually be preferable.  She denies any fevers, chills, headache, dizziness, chest pain, or shortness of breath.    Objective:  VITAL SIGNS: /79   Pulse 79   Temp 36.3 °C (97.3 °F) (Temporal)   Resp 18   Ht 1.6 m (5' 3\")   Wt 84.4 kg (186 lb)   LMP  (LMP Unknown)   SpO2 96%   Breastfeeding? No   BMI 32.95 kg/m²     Recent Results (from the past 72 hour(s))   CBC WITHOUT DIFFERENTIAL    Collection Time: 12/16/18  5:55 AM   Result Value Ref Range    WBC 6.2 4.8 - 10.8 K/uL    RBC 3.47 (L) 4.20 - 5.40 M/uL    Hemoglobin 9.0 (L) 12.0 - 16.0 g/dL    Hematocrit 27.0 (L) 37.0 - 47.0 %    MCV 77.8 (L) 81.4 - 97.8 fL    MCH 25.9 (L) 27.0 - 33.0 pg    MCHC 33.3 (L) 33.6 - 35.0 g/dL    RDW 49.8 35.9 - 50.0 fL    Platelet Count 385 164 - 446 K/uL    MPV 9.5 9.0 - 12.9 fL   BASIC METABOLIC PANEL    Collection Time: 12/16/18  5:55 AM   Result Value Ref Range    Sodium 139 135 - 145 mmol/L    Potassium 3.9 3.6 - 5.5 mmol/L    Chloride 104 96 - 112 mmol/L    Co2 30 20 - 33 mmol/L    Glucose 102 (H) 65 - 99 mg/dL    Bun 8 8 - 22 mg/dL    Creatinine 0.53 0.50 - 1.40 mg/dL    Calcium 8.9 8.5 - 10.5 mg/dL    Anion Gap 5.0 0.0 - 11.9   IRON/TOTAL IRON BIND    Collection Time: 12/16/18  5:55 AM   Result Value Ref Range    Iron 27 (L) 40 - 170 ug/dL "    Total Iron Binding 389 250 - 450 ug/dL    % Saturation 7 (L) 15 - 55 %   ESTIMATED GFR    Collection Time: 12/16/18  5:55 AM   Result Value Ref Range    GFR If African American >60 >60 mL/min/1.73 m 2    GFR If Non African American >60 >60 mL/min/1.73 m 2       Current Facility-Administered Medications   Medication Frequency   • methocarbamol (ROBAXIN) tablet 1,000 mg 4X/DAY   • ferrous sulfate tablet 325 mg QDAY with Breakfast   • ascorbic acid tablet 500 mg QDAY with Breakfast   • morphine ER (MS CONTIN) tablet 30 mg BID   • neomycin sulf/polymyx B sulf/HC soln (CORTISPORIN HC SOL) 3.5-71421-7 otic solution 4 Drop PRN   • bisacodyl (DULCOLAX) suppository 10 mg DAILY   • polyethylene glycol/lytes (MIRALAX) PACKET 1 Packet QDAY PRN   • magnesium hydroxide (MILK OF MAGNESIA) suspension 30 mL QDAY PRN   • valACYclovir (VALTREX) caplet 1,000 mg TID   • Respiratory Care per Protocol Continuous RT   • Pharmacy Consult Request ...Pain Management Review 1 Each PRN   • acetaminophen (TYLENOL) tablet 1,000 mg Q6HRS   • oxyCODONE immediate-release (ROXICODONE) tablet 5 mg Q3HRS PRN   • oxyCODONE immediate release (ROXICODONE) tablet 10 mg Q3HRS PRN   • hydrALAZINE (APRESOLINE) tablet 25 mg Q8HRS PRN   • artificial tears 1.4 % ophthalmic solution 1 Drop PRN   • benzocaine-menthol (CEPACOL) lozenge 1 Lozenge Q2HRS PRN   • mag hydrox-al hydrox-simeth (MAALOX PLUS ES or MYLANTA DS) suspension 20 mL Q2HRS PRN   • ondansetron (ZOFRAN ODT) dispertab 4 mg 4X/DAY PRN    Or   • ondansetron (ZOFRAN) syringe/vial injection 4 mg 4X/DAY PRN   • traZODone (DESYREL) tablet 50 mg QHS PRN   • sodium chloride (OCEAN) 0.65 % nasal spray 2 Spray PRN   • enoxaparin (LOVENOX) inj 40 mg DAILY   • ALPRAZolam (XANAX) tablet 0.5 mg TID PRN   • calcium carbonate (TUMS) chewable tab 500 mg BID   • docusate sodium (COLACE) capsule 100 mg BID   • promethazine (PHENERGAN) tablet 12.5-25 mg Q4HRS PRN   • vitamin D (cholecalciferol) tablet 5,000 Units  DAILY   • cyanocobalamin (VITAMIN B12) tablet 1,000 mcg DAILY   • furosemide (LASIX) tablet 40 mg DAILY   • gabapentin (NEURONTIN) capsule 300 mg TID   • potassium chloride SA (Kdur) tablet 20 mEq DAILY   • normal saline PF 60 mL Q12HRS   • senna-docusate (PERICOLACE or SENOKOT S) 8.6-50 MG per tablet 1 Tab BID   • omeprazole (PRILOSEC) capsule 20 mg DAILY       Exam Date: 12/18/2018    General:  Awake, alert, oriented, no acute distress  HEENT:  Wearing Aspen cervical collar  Cardiac: regular rate and rhythm.  Rate 80 today  Lungs: clear to auscultation bilaterally.   Abdomen: soft; non tender, non distended, bowel sounds present and normoactive  Extremities: 2+ edema in the bilateral lower limbs  Neuro:   Right elbow flexor 2/5, right elbow extensor 3/5, finger flexors have 4/5 strength on the right side and she is able to  the handlebar of the recumbent bicycle      Orders Placed This Encounter   Procedures   • Diet Order Regular (all meats chopped, sandwiches cut in 1/4)     Standing Status:   Standing     Number of Occurrences:   1     Order Specific Question:   Diet:     Answer:   Regular [1]     Comments:   all meats chopped, sandwiches cut in 1/4     Order Specific Question:   Consistency/Fluid modifications:     Answer:   Thin Liquids [3]       Assessment:  Active Hospital Problems    Diagnosis   • *Cervical myelopathy (HCC)   • Edema   • Vitamin D deficiency   • Rash   • Anemia   • Left hip pain   • Obesity (BMI 30-39.9)   • Primary osteoarthritis of left hip- dr nowak; left THR tbd feb, 2019   • Essential hypertension   • Vitamin B 12 deficiency   • History of gastric bypass       Medical Decision Making and Plan:  Karine Ovalle is a 53-year-old female admitted on December 11 for cervical myelopathy    Cervical Myelopathy/C4 AIS C nontraumatic SCI  Status post 2 stage cervical decompression by Dr. Mendoza with ACDF C3-6 on 12/5/18 and C3-C5 laminectomies with C3-T1 posterior fusion on 12/7/18   Continue  comprehensive rehabilitation  Continue cervical collar at all times with brief removal for shower and meals    We discussed discharge planning and skilled nursing referral, and she was agreeable with this plan    Chronic pain  Postoperative pain  Tylenol 1000 mg every 6 hours  Gabapentin 300 mg 3 times a day  Robaxin 1000 mg 4 times a day  MS Contin 30 mg twice a day  Oxycodone 5-10 mg every 3 hours as needed    Patient did not tolerate oxycodone weaning  She will need to follow-up with her outpatient pain provider for discharge prescriptions    She utilized 50 mg of oxycodone yesterday     Dysphagia --resolved  Speech and language pathology advanced diet to regular with thins     Neurogenic bladder  History of incontinence and numbness prior to surgery  Ongoing incontinence    Scheduling timed voids to minimize incontinent episodes  Urgency likely worsened by Lasix, but she has significant lower limb edema     Neurogenic Bowel  Opioid-induced constipation  Dulcolax suppository daily  Colace 100 mg twice a day  Milk of magnesia as needed  MiraLAX as needed  Milagro-Colace 1 tab twice daily    Encouraging use of bathroom versus bedside commode    Lower limb edema  Hypokalemia  Appreciate hospitalist assistance  Lasix 40 mg daily  Potassium chloride 20 mEq daily    Lower limb edema gradually improving    Anemia   Continue B12 and iron  Hemoglobin of 9.0 on December 16  Continue to monitor    Internal medicine has recommended outpatient colonoscopy to follow-up for iron deficiency anemia     Left buttock lesion  This was present on admission  Hospitalist believes likely herpes simplex versus HPV  Valacyclovir until December 19     Vitamin B12 deficiency  Vitamin D deficiency  Continue B12 supplementation  Vitamin D was 14 on admission, so continue supplementation with 5000 units daily     DVT prophylaxis  40 mg Lovenox daily     Estimated discharge: She will need skilled nursing due to slow progress, lack of family  support      Total time:  27 minutes.  I spent greater than 50% of the time for patient care, counseling, and coordination on this date, including unit/floor time, and face-to-face time with the patient as per interval events and assessment and plan above. Topics discussed included functional progress, discharge planning, bowel and bladder function, discussed with case management, discussed with PT        Brice Harvey M.D.  12/18/2018

## 2018-12-18 NOTE — PROGRESS NOTES
Pt wanting soup that her s/o dropped off x2 days ago. Pt educated that soup may not be safe to eat and she may get sick from it. Pt denies ever have gotten sick from the soup and wanted it heated up.

## 2018-12-19 PROCEDURE — 700102 HCHG RX REV CODE 250 W/ 637 OVERRIDE(OP): Performed by: PHYSICAL MEDICINE & REHABILITATION

## 2018-12-19 PROCEDURE — A9270 NON-COVERED ITEM OR SERVICE: HCPCS | Performed by: PHYSICAL MEDICINE & REHABILITATION

## 2018-12-19 PROCEDURE — 770010 HCHG ROOM/CARE - REHAB SEMI PRIVAT*

## 2018-12-19 PROCEDURE — 97530 THERAPEUTIC ACTIVITIES: CPT

## 2018-12-19 PROCEDURE — 97535 SELF CARE MNGMENT TRAINING: CPT

## 2018-12-19 PROCEDURE — 99232 SBSQ HOSP IP/OBS MODERATE 35: CPT | Performed by: PHYSICAL MEDICINE & REHABILITATION

## 2018-12-19 PROCEDURE — 700111 HCHG RX REV CODE 636 W/ 250 OVERRIDE (IP): Performed by: PHYSICAL MEDICINE & REHABILITATION

## 2018-12-19 PROCEDURE — 700112 HCHG RX REV CODE 229: Performed by: PHYSICAL MEDICINE & REHABILITATION

## 2018-12-19 PROCEDURE — 700102 HCHG RX REV CODE 250 W/ 637 OVERRIDE(OP): Performed by: HOSPITALIST

## 2018-12-19 PROCEDURE — 97110 THERAPEUTIC EXERCISES: CPT

## 2018-12-19 PROCEDURE — A9270 NON-COVERED ITEM OR SERVICE: HCPCS | Performed by: HOSPITALIST

## 2018-12-19 PROCEDURE — 99232 SBSQ HOSP IP/OBS MODERATE 35: CPT | Performed by: HOSPITALIST

## 2018-12-19 PROCEDURE — 700101 HCHG RX REV CODE 250: Performed by: PHYSICAL MEDICINE & REHABILITATION

## 2018-12-19 RX ADMIN — OXYCODONE HYDROCHLORIDE 10 MG: 10 TABLET ORAL at 05:10

## 2018-12-19 RX ADMIN — VITAMIN D, TAB 1000IU (100/BT) 5000 UNITS: 25 TAB at 08:28

## 2018-12-19 RX ADMIN — OXYCODONE HYDROCHLORIDE 10 MG: 10 TABLET ORAL at 08:29

## 2018-12-19 RX ADMIN — DOCUSATE SODIUM 100 MG: 100 CAPSULE, LIQUID FILLED ORAL at 08:29

## 2018-12-19 RX ADMIN — ACETAMINOPHEN 1000 MG: 500 TABLET, FILM COATED ORAL at 17:37

## 2018-12-19 RX ADMIN — METHOCARBAMOL 1000 MG: 500 TABLET ORAL at 05:10

## 2018-12-19 RX ADMIN — METHOCARBAMOL 1000 MG: 500 TABLET ORAL at 21:47

## 2018-12-19 RX ADMIN — POTASSIUM CHLORIDE 20 MEQ: 1500 TABLET, EXTENDED RELEASE ORAL at 08:29

## 2018-12-19 RX ADMIN — OXYCODONE HYDROCHLORIDE 10 MG: 10 TABLET ORAL at 15:41

## 2018-12-19 RX ADMIN — FUROSEMIDE 40 MG: 40 TABLET ORAL at 08:29

## 2018-12-19 RX ADMIN — MORPHINE SULFATE 30 MG: 30 TABLET, EXTENDED RELEASE ORAL at 05:10

## 2018-12-19 RX ADMIN — ACETAMINOPHEN 1000 MG: 500 TABLET, FILM COATED ORAL at 05:10

## 2018-12-19 RX ADMIN — GABAPENTIN 300 MG: 300 CAPSULE ORAL at 21:47

## 2018-12-19 RX ADMIN — VALACYCLOVIR HYDROCHLORIDE 1000 MG: 500 TABLET, FILM COATED ORAL at 08:28

## 2018-12-19 RX ADMIN — GABAPENTIN 300 MG: 300 CAPSULE ORAL at 08:29

## 2018-12-19 RX ADMIN — CYANOCOBALAMIN TAB 1000 MCG 1000 MCG: 1000 TAB at 08:29

## 2018-12-19 RX ADMIN — CALCIUM CARBONATE (ANTACID) CHEW TAB 500 MG 500 MG: 500 CHEW TAB at 21:48

## 2018-12-19 RX ADMIN — Medication 60 ML: at 08:33

## 2018-12-19 RX ADMIN — GABAPENTIN 300 MG: 300 CAPSULE ORAL at 15:42

## 2018-12-19 RX ADMIN — MORPHINE SULFATE 30 MG: 30 TABLET, EXTENDED RELEASE ORAL at 17:37

## 2018-12-19 RX ADMIN — CALCIUM CARBONATE (ANTACID) CHEW TAB 500 MG 500 MG: 500 CHEW TAB at 08:29

## 2018-12-19 RX ADMIN — OXYCODONE HYDROCHLORIDE AND ACETAMINOPHEN 500 MG: 500 TABLET ORAL at 08:29

## 2018-12-19 RX ADMIN — FERROUS SULFATE TAB 325 MG (65 MG ELEMENTAL FE) 325 MG: 325 (65 FE) TAB at 08:29

## 2018-12-19 RX ADMIN — METHOCARBAMOL 1000 MG: 500 TABLET ORAL at 15:41

## 2018-12-19 RX ADMIN — ALPRAZOLAM 0.5 MG: 0.5 TABLET ORAL at 21:47

## 2018-12-19 RX ADMIN — OMEPRAZOLE 20 MG: 20 CAPSULE, DELAYED RELEASE ORAL at 08:29

## 2018-12-19 RX ADMIN — OXYCODONE HYDROCHLORIDE 10 MG: 10 TABLET ORAL at 11:47

## 2018-12-19 RX ADMIN — Medication 60 ML: at 22:12

## 2018-12-19 RX ADMIN — METHOCARBAMOL 1000 MG: 500 TABLET ORAL at 11:46

## 2018-12-19 RX ADMIN — SENNOSIDES AND DOCUSATE SODIUM 1 TABLET: 8.6; 5 TABLET ORAL at 08:29

## 2018-12-19 RX ADMIN — ENOXAPARIN SODIUM 40 MG: 100 INJECTION SUBCUTANEOUS at 08:31

## 2018-12-19 RX ADMIN — DOCUSATE SODIUM 100 MG: 100 CAPSULE, LIQUID FILLED ORAL at 21:47

## 2018-12-19 RX ADMIN — SENNOSIDES AND DOCUSATE SODIUM 1 TABLET: 8.6; 5 TABLET ORAL at 21:48

## 2018-12-19 RX ADMIN — ACETAMINOPHEN 1000 MG: 500 TABLET, FILM COATED ORAL at 11:46

## 2018-12-19 ASSESSMENT — ENCOUNTER SYMPTOMS
NAUSEA: 0
NECK PAIN: 1
CHILLS: 0
SHORTNESS OF BREATH: 0
VOMITING: 0
ABDOMINAL PAIN: 0
FOCAL WEAKNESS: 1
COUGH: 0
FEVER: 0

## 2018-12-19 ASSESSMENT — PAIN SCALES - GENERAL
PAINLEVEL_OUTOF10: 5
PAINLEVEL_OUTOF10: 5

## 2018-12-19 NOTE — DISCHARGE PLANNING
SNF referrals sent to [facility], [facility] and [place] per 's request.  Awaiting responses.  Per Kari at [facility], they will have no beds until after Radha.

## 2018-12-19 NOTE — DISCHARGE PLANNING
Per Glenny at [facility], they can accept patient pending insurance auth.  She will submit for auth this afternoon.

## 2018-12-19 NOTE — DISCHARGE PLANNING
Case Management:  Met with patient.  Updated her that [facility] declined patient.  She requests referrals to [place], [facility] and [place].  Will complete referrals and follow.

## 2018-12-19 NOTE — PROGRESS NOTES
Received shift report and assumed care of patient.  Patient awake, calm and stable, currently positioned in chair for comfort and safety; call light within reach.  Denies pain or discomfort at this time.  Will continue to monitor.

## 2018-12-19 NOTE — PROGRESS NOTES
Hospital Medicine Daily Progress Note        Chief Complaint  Edema    Interval Problem Update  12/17: Leg swelling better.  12/18: Waiting to go to the bathroom.   12/19: Blood pressure unremarkable.  Completing valacyclovir.  Patient is asking for small SCDs as well as concurrent dosing of oxycodone and MS Contin which she tolerated this morning.  Discussed with rehab Dr. Kimble.    Review of Systems  Review of Systems   Constitutional: Negative for chills and fever.   Respiratory: Negative for cough and shortness of breath.    Cardiovascular: Positive for leg swelling (feels SCDs improve it). Negative for chest pain.   Gastrointestinal: Negative for abdominal pain, nausea and vomiting.   Genitourinary: Positive for urgency.   Musculoskeletal: Positive for joint pain and neck pain.        Wound pain   Skin: Positive for rash.   Neurological: Positive for focal weakness.        Physical Exam  Temp:  [36.7 °C (98 °F)-36.8 °C (98.2 °F)] 36.7 °C (98 °F)  Pulse:  [66-99] 66  Resp:  [18-19] 18  BP: (106-116)/(64-79) 106/64    Physical Exam   Constitutional: She is oriented to person, place, and time. No distress.   HENT:   Head: Normocephalic and atraumatic.   Eyes: Conjunctivae are normal. Right eye exhibits no discharge.   Neck:   C-collar   Cardiovascular: Normal rate and regular rhythm.    Pulmonary/Chest: Effort normal and breath sounds normal. No respiratory distress. She has no wheezes.   Abdominal: Soft. Bowel sounds are normal. She exhibits no distension. There is no tenderness. There is no rebound and no guarding.   Musculoskeletal: She exhibits edema.   2-3+ edema LLE, 2+ RLE   Neurological: She is alert and oriented to person, place, and time.   Skin: Skin is warm. She is not diaphoretic.   Vitals reviewed.      Fluids    Intake/Output Summary (Last 24 hours) at 12/19/18 1120  Last data filed at 12/18/18 2126   Gross per 24 hour   Intake              400 ml   Output                0 ml   Net               400 ml       Laboratory                        Assessment/Plan  * Cervical myelopathy (HCC)   Assessment & Plan    S/P cervical laminectomy and fusion  Cervical collar     Anemia   Assessment & Plan    Fe and Vit C supplementation started  Recommend outpt colonoscopy due to iron deficiency anemia     Rash   Assessment & Plan    Left buttock vesicular lesions concerning for Herpes Zoster vs Herpes Simplex  Completed 7 day course of Valtrex     Vitamin D deficiency   Assessment & Plan    Vit D level 14 12/12/2018  On supplementation     Edema   Assessment & Plan    Pt reports that at home she uses SCD's  Echo shows EF 60%  BLE Venous Duplex negative for DVT  Continue Lasix as tolerated by BP and renal function     Essential hypertension- (present on admission)   Assessment & Plan    Presently controlled with Lasix  Continue to monitor BP trends     History of gastric bypass- (present on admission)   Assessment & Plan    Monitor for electrolyte disturbances  Likely cause of B12 and D deficiency     Full Code

## 2018-12-20 PROCEDURE — A9270 NON-COVERED ITEM OR SERVICE: HCPCS | Performed by: PHYSICAL MEDICINE & REHABILITATION

## 2018-12-20 PROCEDURE — 97110 THERAPEUTIC EXERCISES: CPT

## 2018-12-20 PROCEDURE — 99232 SBSQ HOSP IP/OBS MODERATE 35: CPT | Performed by: PHYSICAL MEDICINE & REHABILITATION

## 2018-12-20 PROCEDURE — 97530 THERAPEUTIC ACTIVITIES: CPT

## 2018-12-20 PROCEDURE — 97535 SELF CARE MNGMENT TRAINING: CPT

## 2018-12-20 PROCEDURE — A9270 NON-COVERED ITEM OR SERVICE: HCPCS | Performed by: HOSPITALIST

## 2018-12-20 PROCEDURE — 700102 HCHG RX REV CODE 250 W/ 637 OVERRIDE(OP): Performed by: PHYSICAL MEDICINE & REHABILITATION

## 2018-12-20 PROCEDURE — 700112 HCHG RX REV CODE 229: Performed by: PHYSICAL MEDICINE & REHABILITATION

## 2018-12-20 PROCEDURE — 700101 HCHG RX REV CODE 250: Performed by: PHYSICAL MEDICINE & REHABILITATION

## 2018-12-20 PROCEDURE — 700111 HCHG RX REV CODE 636 W/ 250 OVERRIDE (IP): Performed by: PHYSICAL MEDICINE & REHABILITATION

## 2018-12-20 PROCEDURE — 770010 HCHG ROOM/CARE - REHAB SEMI PRIVAT*

## 2018-12-20 PROCEDURE — 99232 SBSQ HOSP IP/OBS MODERATE 35: CPT | Performed by: HOSPITALIST

## 2018-12-20 PROCEDURE — 97116 GAIT TRAINING THERAPY: CPT

## 2018-12-20 PROCEDURE — 700102 HCHG RX REV CODE 250 W/ 637 OVERRIDE(OP): Performed by: HOSPITALIST

## 2018-12-20 RX ADMIN — METHOCARBAMOL 1000 MG: 500 TABLET ORAL at 15:24

## 2018-12-20 RX ADMIN — GABAPENTIN 300 MG: 300 CAPSULE ORAL at 15:24

## 2018-12-20 RX ADMIN — ACETAMINOPHEN 1000 MG: 500 TABLET, FILM COATED ORAL at 06:08

## 2018-12-20 RX ADMIN — METHOCARBAMOL 1000 MG: 500 TABLET ORAL at 21:51

## 2018-12-20 RX ADMIN — FERROUS SULFATE TAB 325 MG (65 MG ELEMENTAL FE) 325 MG: 325 (65 FE) TAB at 09:45

## 2018-12-20 RX ADMIN — CALCIUM CARBONATE (ANTACID) CHEW TAB 500 MG 500 MG: 500 CHEW TAB at 21:51

## 2018-12-20 RX ADMIN — Medication 60 ML: at 09:47

## 2018-12-20 RX ADMIN — POTASSIUM CHLORIDE 20 MEQ: 1500 TABLET, EXTENDED RELEASE ORAL at 09:44

## 2018-12-20 RX ADMIN — Medication 60 ML: at 21:52

## 2018-12-20 RX ADMIN — VITAMIN D, TAB 1000IU (100/BT) 5000 UNITS: 25 TAB at 09:44

## 2018-12-20 RX ADMIN — GABAPENTIN 300 MG: 300 CAPSULE ORAL at 09:45

## 2018-12-20 RX ADMIN — CYANOCOBALAMIN TAB 1000 MCG 1000 MCG: 1000 TAB at 09:45

## 2018-12-20 RX ADMIN — CALCIUM CARBONATE (ANTACID) CHEW TAB 500 MG 500 MG: 500 CHEW TAB at 09:45

## 2018-12-20 RX ADMIN — MORPHINE SULFATE 30 MG: 30 TABLET, EXTENDED RELEASE ORAL at 06:08

## 2018-12-20 RX ADMIN — ACETAMINOPHEN 1000 MG: 500 TABLET, FILM COATED ORAL at 00:52

## 2018-12-20 RX ADMIN — GABAPENTIN 300 MG: 300 CAPSULE ORAL at 21:52

## 2018-12-20 RX ADMIN — ALPRAZOLAM 0.5 MG: 0.5 TABLET ORAL at 16:31

## 2018-12-20 RX ADMIN — SENNOSIDES AND DOCUSATE SODIUM 1 TABLET: 8.6; 5 TABLET ORAL at 09:45

## 2018-12-20 RX ADMIN — ACETAMINOPHEN 1000 MG: 500 TABLET, FILM COATED ORAL at 12:26

## 2018-12-20 RX ADMIN — OXYCODONE HYDROCHLORIDE 10 MG: 10 TABLET ORAL at 21:52

## 2018-12-20 RX ADMIN — OXYCODONE HYDROCHLORIDE AND ACETAMINOPHEN 500 MG: 500 TABLET ORAL at 09:45

## 2018-12-20 RX ADMIN — METHOCARBAMOL 1000 MG: 500 TABLET ORAL at 06:08

## 2018-12-20 RX ADMIN — OXYCODONE HYDROCHLORIDE 10 MG: 10 TABLET ORAL at 07:08

## 2018-12-20 RX ADMIN — OXYCODONE HYDROCHLORIDE 10 MG: 10 TABLET ORAL at 09:52

## 2018-12-20 RX ADMIN — DOCUSATE SODIUM 100 MG: 100 CAPSULE, LIQUID FILLED ORAL at 09:45

## 2018-12-20 RX ADMIN — OXYCODONE HYDROCHLORIDE 10 MG: 10 TABLET ORAL at 16:30

## 2018-12-20 RX ADMIN — MORPHINE SULFATE 30 MG: 30 TABLET, EXTENDED RELEASE ORAL at 18:15

## 2018-12-20 RX ADMIN — FUROSEMIDE 40 MG: 40 TABLET ORAL at 09:45

## 2018-12-20 RX ADMIN — ENOXAPARIN SODIUM 40 MG: 100 INJECTION SUBCUTANEOUS at 09:46

## 2018-12-20 RX ADMIN — ACETAMINOPHEN 1000 MG: 500 TABLET, FILM COATED ORAL at 18:15

## 2018-12-20 RX ADMIN — SENNOSIDES AND DOCUSATE SODIUM 1 TABLET: 8.6; 5 TABLET ORAL at 22:02

## 2018-12-20 RX ADMIN — ALPRAZOLAM 0.5 MG: 0.5 TABLET ORAL at 21:52

## 2018-12-20 RX ADMIN — METHOCARBAMOL 1000 MG: 500 TABLET ORAL at 12:25

## 2018-12-20 RX ADMIN — OMEPRAZOLE 20 MG: 20 CAPSULE, DELAYED RELEASE ORAL at 09:45

## 2018-12-20 RX ADMIN — DOCUSATE SODIUM 100 MG: 100 CAPSULE, LIQUID FILLED ORAL at 21:52

## 2018-12-20 RX ADMIN — OXYCODONE HYDROCHLORIDE 10 MG: 10 TABLET ORAL at 13:32

## 2018-12-20 ASSESSMENT — ENCOUNTER SYMPTOMS
BLURRED VISION: 0
WEAKNESS: 1
TREMORS: 0
COUGH: 0
PHOTOPHOBIA: 0
VOMITING: 0
BLOOD IN STOOL: 0
HEADACHES: 0
STRIDOR: 0
ORTHOPNEA: 0
HEARTBURN: 0
PND: 0
TINGLING: 0
FEVER: 0
CONSTIPATION: 0
NAUSEA: 0
CHILLS: 0
DOUBLE VISION: 0
DIZZINESS: 0
SHORTNESS OF BREATH: 0
PALPITATIONS: 0
MEMORY LOSS: 0
NERVOUS/ANXIOUS: 0
DEPRESSION: 0
EYE PAIN: 0
BACK PAIN: 0
ABDOMINAL PAIN: 0
HEMOPTYSIS: 0
CLAUDICATION: 0
FOCAL WEAKNESS: 1
SPEECH CHANGE: 0
SPUTUM PRODUCTION: 0
SORE THROAT: 0
MYALGIAS: 1
NECK PAIN: 1
SENSORY CHANGE: 0

## 2018-12-20 ASSESSMENT — PAIN SCALES - GENERAL: PAINLEVEL_OUTOF10: 6

## 2018-12-20 NOTE — DISCHARGE PLANNING
Case Management:  Patient has been accepted at [facility].  They are working on insurance auth.  Faxed additional paperwork for her catastrophic leave.  Will follow.

## 2018-12-21 PROCEDURE — A9270 NON-COVERED ITEM OR SERVICE: HCPCS | Performed by: PHYSICAL MEDICINE & REHABILITATION

## 2018-12-21 PROCEDURE — 700101 HCHG RX REV CODE 250: Performed by: PHYSICAL MEDICINE & REHABILITATION

## 2018-12-21 PROCEDURE — 700102 HCHG RX REV CODE 250 W/ 637 OVERRIDE(OP): Performed by: PHYSICAL MEDICINE & REHABILITATION

## 2018-12-21 PROCEDURE — 700112 HCHG RX REV CODE 229: Performed by: PHYSICAL MEDICINE & REHABILITATION

## 2018-12-21 PROCEDURE — 97110 THERAPEUTIC EXERCISES: CPT

## 2018-12-21 PROCEDURE — 770010 HCHG ROOM/CARE - REHAB SEMI PRIVAT*

## 2018-12-21 PROCEDURE — 97530 THERAPEUTIC ACTIVITIES: CPT

## 2018-12-21 PROCEDURE — 97535 SELF CARE MNGMENT TRAINING: CPT

## 2018-12-21 PROCEDURE — 700102 HCHG RX REV CODE 250 W/ 637 OVERRIDE(OP): Performed by: HOSPITALIST

## 2018-12-21 PROCEDURE — 97116 GAIT TRAINING THERAPY: CPT

## 2018-12-21 PROCEDURE — 700111 HCHG RX REV CODE 636 W/ 250 OVERRIDE (IP): Performed by: PHYSICAL MEDICINE & REHABILITATION

## 2018-12-21 PROCEDURE — A9270 NON-COVERED ITEM OR SERVICE: HCPCS | Performed by: HOSPITALIST

## 2018-12-21 PROCEDURE — 99232 SBSQ HOSP IP/OBS MODERATE 35: CPT | Performed by: PHYSICAL MEDICINE & REHABILITATION

## 2018-12-21 PROCEDURE — 99232 SBSQ HOSP IP/OBS MODERATE 35: CPT | Performed by: HOSPITALIST

## 2018-12-21 RX ORDER — METHOCARBAMOL 500 MG/1
1000 TABLET, FILM COATED ORAL 4 TIMES DAILY
Status: DISCONTINUED | OUTPATIENT
Start: 2018-12-21 | End: 2018-12-26 | Stop reason: HOSPADM

## 2018-12-21 RX ADMIN — CALCIUM CARBONATE (ANTACID) CHEW TAB 500 MG 500 MG: 500 CHEW TAB at 20:48

## 2018-12-21 RX ADMIN — GABAPENTIN 300 MG: 300 CAPSULE ORAL at 20:48

## 2018-12-21 RX ADMIN — FERROUS SULFATE TAB 325 MG (65 MG ELEMENTAL FE) 325 MG: 325 (65 FE) TAB at 08:13

## 2018-12-21 RX ADMIN — VITAMIN D, TAB 1000IU (100/BT) 5000 UNITS: 25 TAB at 08:14

## 2018-12-21 RX ADMIN — GABAPENTIN 300 MG: 300 CAPSULE ORAL at 17:36

## 2018-12-21 RX ADMIN — CALCIUM CARBONATE (ANTACID) CHEW TAB 500 MG 500 MG: 500 CHEW TAB at 08:12

## 2018-12-21 RX ADMIN — OXYCODONE HYDROCHLORIDE 10 MG: 10 TABLET ORAL at 17:36

## 2018-12-21 RX ADMIN — METHOCARBAMOL 1000 MG: 500 TABLET ORAL at 20:48

## 2018-12-21 RX ADMIN — DOCUSATE SODIUM 100 MG: 100 CAPSULE, LIQUID FILLED ORAL at 08:13

## 2018-12-21 RX ADMIN — ALPRAZOLAM 0.5 MG: 0.5 TABLET ORAL at 20:48

## 2018-12-21 RX ADMIN — DOCUSATE SODIUM 100 MG: 100 CAPSULE, LIQUID FILLED ORAL at 20:48

## 2018-12-21 RX ADMIN — POTASSIUM CHLORIDE 20 MEQ: 1500 TABLET, EXTENDED RELEASE ORAL at 08:13

## 2018-12-21 RX ADMIN — ENOXAPARIN SODIUM 40 MG: 100 INJECTION SUBCUTANEOUS at 08:14

## 2018-12-21 RX ADMIN — MORPHINE SULFATE 30 MG: 30 TABLET, EXTENDED RELEASE ORAL at 17:30

## 2018-12-21 RX ADMIN — SENNOSIDES AND DOCUSATE SODIUM 1 TABLET: 8.6; 5 TABLET ORAL at 08:14

## 2018-12-21 RX ADMIN — OMEPRAZOLE 20 MG: 20 CAPSULE, DELAYED RELEASE ORAL at 08:13

## 2018-12-21 RX ADMIN — OXYCODONE HYDROCHLORIDE 10 MG: 10 TABLET ORAL at 13:13

## 2018-12-21 RX ADMIN — GABAPENTIN 300 MG: 300 CAPSULE ORAL at 08:14

## 2018-12-21 RX ADMIN — FUROSEMIDE 40 MG: 40 TABLET ORAL at 08:13

## 2018-12-21 RX ADMIN — ACETAMINOPHEN 1000 MG: 500 TABLET, FILM COATED ORAL at 05:52

## 2018-12-21 RX ADMIN — CYANOCOBALAMIN TAB 1000 MCG 1000 MCG: 1000 TAB at 08:14

## 2018-12-21 RX ADMIN — SENNOSIDES AND DOCUSATE SODIUM 1 TABLET: 8.6; 5 TABLET ORAL at 20:48

## 2018-12-21 RX ADMIN — Medication 60 ML: at 08:15

## 2018-12-21 RX ADMIN — METHOCARBAMOL 1000 MG: 500 TABLET ORAL at 13:13

## 2018-12-21 RX ADMIN — OXYCODONE HYDROCHLORIDE AND ACETAMINOPHEN 500 MG: 500 TABLET ORAL at 08:14

## 2018-12-21 RX ADMIN — OXYCODONE HYDROCHLORIDE 10 MG: 10 TABLET ORAL at 05:52

## 2018-12-21 RX ADMIN — METHOCARBAMOL 1000 MG: 500 TABLET ORAL at 17:29

## 2018-12-21 RX ADMIN — MORPHINE SULFATE 30 MG: 30 TABLET, EXTENDED RELEASE ORAL at 05:52

## 2018-12-21 ASSESSMENT — ENCOUNTER SYMPTOMS
DIZZINESS: 0
NECK PAIN: 1
WEAKNESS: 1
ABDOMINAL PAIN: 0
SPUTUM PRODUCTION: 0
HEADACHES: 0
STRIDOR: 0
NAUSEA: 0
MEMORY LOSS: 0
ORTHOPNEA: 0
HEARTBURN: 0
CHILLS: 0
BLOOD IN STOOL: 0
TINGLING: 0
CLAUDICATION: 0
COUGH: 0
SORE THROAT: 0
EYE PAIN: 0
CONSTIPATION: 0
NERVOUS/ANXIOUS: 0
SHORTNESS OF BREATH: 0
SPEECH CHANGE: 0
DEPRESSION: 0
FEVER: 0
VOMITING: 0
PND: 0
FOCAL WEAKNESS: 1
DOUBLE VISION: 0
SENSORY CHANGE: 0
HEMOPTYSIS: 0
MYALGIAS: 1
PHOTOPHOBIA: 0
TREMORS: 0
BLURRED VISION: 0
BACK PAIN: 0
PALPITATIONS: 0

## 2018-12-21 ASSESSMENT — PAIN SCALES - GENERAL
PAINLEVEL_OUTOF10: 10
PAINLEVEL_OUTOF10: ASSUMED PAIN PRESENT
PAINLEVEL_OUTOF10: 0
PAINLEVEL_OUTOF10: ASSUMED PAIN PRESENT
PAINLEVEL_OUTOF10: ASSUMED PAIN PRESENT
PAINLEVEL_OUTOF10: 10

## 2018-12-21 NOTE — DISCHARGE PLANNING
Case Management:  Patient wants to re visit referral to [facility] and has had contact with them.  Per patient, they may accept her if she can work out her payment arrangement.  I have contacted them and per their request sent a second referral.  They are in the process of reviewing this.  I will await their determination and then call [facility] for an update.  If accepted by [facility], I will see what needs to be done to change pending auth to [facility].  Awaiting call back.  Patient continues to fixate on all of her disability paperwork.  She has asked for assistance to dictate a letter to appeal disability on a prior surgery.  I have let her know that I cannot assist with this since it was a prior medical issue and hospitalization.  Will follow.

## 2018-12-21 NOTE — PROGRESS NOTES
Hospital Medicine Daily Progress Note        Chief Complaint  Edema    Interval Problem Update  12/17: Leg swelling better.  12/18: Waiting to go to the bathroom.   12/19: Blood pressure unremarkable.  Completing valacyclovir.  Patient is asking for small SCDs as well as concurrent dosing of oxycodone and MS Contin which she tolerated this morning.  Discussed with rehab Dr. Kimble.    12/20  No acute issues overnight patient restless otherwise comfortable watching TV.  Was asking for smaller SCDs otherwise denies having any chest pain shortness of breath or nausea vomiting. Her overall pain is well controlled on current regimen.  Continue Lasix for diuresis  Continue rehab per Dr. Harvey    Review of Systems  Review of Systems   Constitutional: Positive for malaise/fatigue. Negative for chills and fever.   HENT: Negative for congestion, hearing loss, sore throat and tinnitus.    Eyes: Negative for blurred vision, double vision, photophobia and pain.   Respiratory: Negative for cough, hemoptysis, sputum production, shortness of breath and stridor.    Cardiovascular: Positive for leg swelling (bilatereal improving). Negative for chest pain, palpitations, orthopnea, claudication and PND.   Gastrointestinal: Negative for abdominal pain, blood in stool, constipation, heartburn, melena, nausea and vomiting.   Genitourinary: Negative for dysuria, frequency and urgency.   Musculoskeletal: Positive for joint pain, myalgias and neck pain. Negative for back pain.   Skin: Negative for rash.   Neurological: Positive for focal weakness and weakness. Negative for dizziness, tingling, tremors, sensory change, speech change and headaches.   Psychiatric/Behavioral: Negative for depression, memory loss and suicidal ideas. The patient is not nervous/anxious.         Physical Exam  Temp:  [35.9 °C (96.6 °F)-36.9 °C (98.5 °F)] 35.9 °C (96.6 °F)  Pulse:  [] 108  Resp:  [18] 18  BP: (103-134)/(68-89) 103/68    Physical Exam    Constitutional: She is oriented to person, place, and time. She appears well-developed and well-nourished. No distress.   HENT:   Head: Normocephalic and atraumatic.   Mouth/Throat: No oropharyngeal exudate.   Eyes: Pupils are equal, round, and reactive to light. Conjunctivae are normal. Right eye exhibits no discharge. No scleral icterus.   Neck: Neck supple. No JVD present. No thyromegaly present.   Cervical collar in place   Cardiovascular: Normal rate, regular rhythm and intact distal pulses.    No murmur heard.  Pulmonary/Chest: Effort normal and breath sounds normal. No stridor. No respiratory distress. She has no wheezes. She has no rales.   Abdominal: Soft. Bowel sounds are normal. She exhibits no distension. There is no tenderness. There is no rebound and no guarding.   Musculoskeletal: Normal range of motion. She exhibits edema.   +2 pitting edema bilateral lower extremities   Neurological: She is alert and oriented to person, place, and time. No cranial nerve deficit.   Skin: Skin is warm. She is not diaphoretic. No erythema.   Psychiatric: She has a normal mood and affect. Her behavior is normal. Thought content normal.   Vitals reviewed.      Fluids    Intake/Output Summary (Last 24 hours) at 12/20/18 1813  Last data filed at 12/19/18 2130   Gross per 24 hour   Intake              560 ml   Output              300 ml   Net              260 ml       Laboratory                        Assessment/Plan  * Cervical myelopathy (HCC)   Assessment & Plan    S/P cervical laminectomy and fusion  Maintain Cervical collar     Anemia   Assessment & Plan    Iron deficient  Resume oral iron supplementation  Upon discharge recommend outpatient GI appointment for diagnostic colonoscopy       Rash   Assessment & Plan    Completed 7 days of Valtrex for possible herpes zoster rash     Vitamin D deficiency   Assessment & Plan    Continue vitamin D supplementation     Edema   Assessment & Plan    Her echocardiogram showed  preserved LVEF of 60%  Lower extremity duplex was negative for DVTs  Resume Lasix, measure urine output       Essential hypertension- (present on admission)   Assessment & Plan    Control patient is only on Lasix which we will continue     History of gastric bypass- (present on admission)   Assessment & Plan    Resume as needed Tums, omeprazole for GI prophylaxis       Patient's CODE STATUS is full code

## 2018-12-21 NOTE — PROGRESS NOTES
"Rehab Progress Note     Encounter date: 12/20/2018  Today I met with the patient face to face in her room    Chief Complaint:  Cervical myelopathy (HCC) , improved strength    Interval Events (subjective)  Karine Ovalle is doing well today.  She denies any fevers, chills, headache, dizziness, chest pain, or shortness of breath.  She continues to report success with right upper limb strengthening.  She was will to do antigravity exercises in bed with occupational therapy today.  She reports that her pain is better controlled today.  She denies any other concerns.    Objective:  VITAL SIGNS: /68   Pulse (!) 108   Temp 35.9 °C (96.6 °F) (Temporal)   Resp 18   Ht 1.6 m (5' 3\")   Wt 84.4 kg (186 lb)   LMP  (LMP Unknown)   SpO2 98%   Breastfeeding? No   BMI 32.95 kg/m²     No results found for this or any previous visit (from the past 72 hour(s)).    Current Facility-Administered Medications   Medication Frequency   • methocarbamol (ROBAXIN) tablet 1,000 mg 4X/DAY   • ferrous sulfate tablet 325 mg QDAY with Breakfast   • ascorbic acid tablet 500 mg QDAY with Breakfast   • morphine ER (MS CONTIN) tablet 30 mg BID   • neomycin sulf/polymyx B sulf/HC soln (CORTISPORIN HC SOL) 3.5-60785-2 otic solution 4 Drop PRN   • bisacodyl (DULCOLAX) suppository 10 mg DAILY   • polyethylene glycol/lytes (MIRALAX) PACKET 1 Packet QDAY PRN   • magnesium hydroxide (MILK OF MAGNESIA) suspension 30 mL QDAY PRN   • Respiratory Care per Protocol Continuous RT   • Pharmacy Consult Request ...Pain Management Review 1 Each PRN   • acetaminophen (TYLENOL) tablet 1,000 mg Q6HRS   • oxyCODONE immediate-release (ROXICODONE) tablet 5 mg Q3HRS PRN   • oxyCODONE immediate release (ROXICODONE) tablet 10 mg Q3HRS PRN   • hydrALAZINE (APRESOLINE) tablet 25 mg Q8HRS PRN   • artificial tears 1.4 % ophthalmic solution 1 Drop PRN   • benzocaine-menthol (CEPACOL) lozenge 1 Lozenge Q2HRS PRN   • mag hydrox-al hydrox-simeth (MAALOX PLUS ES or MYLANTA " DS) suspension 20 mL Q2HRS PRN   • ondansetron (ZOFRAN ODT) dispertab 4 mg 4X/DAY PRN    Or   • ondansetron (ZOFRAN) syringe/vial injection 4 mg 4X/DAY PRN   • traZODone (DESYREL) tablet 50 mg QHS PRN   • sodium chloride (OCEAN) 0.65 % nasal spray 2 Spray PRN   • enoxaparin (LOVENOX) inj 40 mg DAILY   • ALPRAZolam (XANAX) tablet 0.5 mg TID PRN   • calcium carbonate (TUMS) chewable tab 500 mg BID   • docusate sodium (COLACE) capsule 100 mg BID   • promethazine (PHENERGAN) tablet 12.5-25 mg Q4HRS PRN   • vitamin D (cholecalciferol) tablet 5,000 Units DAILY   • cyanocobalamin (VITAMIN B12) tablet 1,000 mcg DAILY   • furosemide (LASIX) tablet 40 mg DAILY   • gabapentin (NEURONTIN) capsule 300 mg TID   • potassium chloride SA (Kdur) tablet 20 mEq DAILY   • normal saline PF 60 mL Q12HRS   • senna-docusate (PERICOLACE or SENOKOT S) 8.6-50 MG per tablet 1 Tab BID   • omeprazole (PRILOSEC) capsule 20 mg DAILY       Exam Date: 12/20/2018    General:  Awake, alert, oriented, no acute distress  HEENT:  Wearing Aspen cervical collar  Cardiac: regular rate and rhythm  Lungs: clear to auscultation bilaterally.   Abdomen: soft; non tender, non distended, bowel sounds present and normoactive  Extremities: Stable 2+ ankle and foot edema  Neuro: There has been interval improvement in her manual muscle testing.  She demonstrates 3/5 range of motion in the right elbow flexor in a supine position in bed today.  Finger flexors remain 4/5.      Orders Placed This Encounter   Procedures   • Diet Order Regular (all meats chopped, sandwiches cut in 1/4)     Standing Status:   Standing     Number of Occurrences:   1     Order Specific Question:   Diet:     Answer:   Regular [1]     Comments:   all meats chopped, sandwiches cut in 1/4     Order Specific Question:   Consistency/Fluid modifications:     Answer:   Thin Liquids [3]       Assessment:  Active Hospital Problems    Diagnosis   • *Cervical myelopathy (HCC)   • Edema   • Vitamin D  deficiency   • Rash   • Anemia   • Left hip pain   • Obesity (BMI 30-39.9)   • Primary osteoarthritis of left hip- dr nowak; left THR tbd feb, 2019   • Essential hypertension   • Vitamin B 12 deficiency   • History of gastric bypass       Medical Decision Making and Plan:  Karine Ovalle is a 53-year-old female admitted on December 11 for cervical myelopathy    Cervical Myelopathy/C4 AIS C nontraumatic SCI  Status post 2 stage cervical decompression by Dr. Mendoza with ACDF C3-6 on 12/5/18 and C3-C5 laminectomies with C3-T1 posterior fusion on 12/7/18   Continue comprehensive rehabilitation  Continue cervical collar at all times with brief removal for shower and meals    Continuing to work on conference of therapy while awaiting skilled nursing facility placement    Chronic pain  Postoperative pain  Tylenol 1000 mg every 6 hours  Gabapentin 300 mg 3 times a day  Robaxin 1000 mg 4 times a day  MS Contin 30 mg twice a day  Oxycodone 5-10 mg every 3 hours as needed    Patient did not tolerate oxycodone weaning  She will need to follow-up with her outpatient pain provider for discharge prescriptions    She has utilized 50 mg of oxycodone in the last 24 hours     Dysphagia --resolved  Speech and language pathology advanced diet to regular with thins     Neurogenic bladder  History of incontinence and numbness prior to surgery  Ongoing incontinence    Timed voids to minimize incontinent episodes  Urgency likely worsened by Lasix, but she has significant lower limb edema    Reinforced timed voiding to prevent incontinent episodes     Neurogenic Bowel  Opioid-induced constipation  Dulcolax suppository daily  Colace 100 mg twice a day  Milk of magnesia as needed  MiraLAX as needed  Milagro-Colace 1 tab twice daily    Lower limb edema  Hypokalemia  Appreciate hospitalist assistance  Lasix 40 mg daily  Potassium chloride 20 mEq daily    Lower limb edema gradually improving    Anemia   Continue B12 and iron  Hemoglobin of 9.0 on  December 16  Continue to monitor    Internal medicine has recommended outpatient colonoscopy to follow-up for iron deficiency anemia     Left buttock lesion  This was present on admission  Hospitalist believes likely herpes simplex versus HPV  Valacyclovir until December 19     Vitamin B12 deficiency  Vitamin D deficiency  Continue B12 supplementation  Vitamin D was 14 on admission, so continue supplementation with 5000 units daily     DVT prophylaxis  40 mg Lovenox daily     Estimated discharge: She will need skilled nursing due to slow progress, lack of family support      Total time:  25 minutes.  I spent greater than 50% of the time for patient care, counseling, and coordination on this date, including unit/floor time, and face-to-face time with the patient as per interval events and assessment and plan above. Topics discussed included functional progress, improvement in right upper limb strength, opioid and pain medication regimen.  Discussed with nursing and case management        Brice Harvey M.D.  12/20/2018

## 2018-12-21 NOTE — PROGRESS NOTES
Hospital Medicine Daily Progress Note        Chief Complaint  Edema    Interval Problem Update  12/17: Leg swelling better.  12/18: Waiting to go to the bathroom.   12/19: Blood pressure unremarkable.  Completing valacyclovir.  Patient is asking for small SCDs as well as concurrent dosing of oxycodone and MS Contin which she tolerated this morning.  Discussed with rehab Dr. Kimble.    12/20  No acute issues overnight patient restless otherwise comfortable watching TV.  Was asking for smaller SCDs otherwise denies having any chest pain shortness of breath or nausea vomiting. Her overall pain is well controlled on current regimen.  Continue Lasix for diuresis  Continue rehab per Dr. Harvey    12/21  Overall in good spirits, was discussing about her SCDs not fitting perfectly, reassurance was provided. Progressing well overall  Continue with exercises.   Resume lasix, note borderline BP, however patient asymptomatic.      Review of Systems  Review of Systems   Constitutional: Positive for malaise/fatigue. Negative for chills and fever.   HENT: Negative for congestion, hearing loss, sore throat and tinnitus.    Eyes: Negative for blurred vision, double vision, photophobia and pain.   Respiratory: Negative for cough, hemoptysis, sputum production, shortness of breath and stridor.    Cardiovascular: Positive for leg swelling (bilatereal improving gradually). Negative for chest pain, palpitations, orthopnea, claudication and PND.   Gastrointestinal: Negative for abdominal pain, blood in stool, constipation, heartburn, melena, nausea and vomiting.   Genitourinary: Negative for dysuria, frequency and urgency.   Musculoskeletal: Positive for joint pain, myalgias (overall less) and neck pain. Negative for back pain.   Skin: Negative for rash.   Neurological: Positive for focal weakness and weakness. Negative for dizziness, tingling, tremors, sensory change, speech change and headaches.   Psychiatric/Behavioral: Negative for  depression, memory loss and suicidal ideas. The patient is not nervous/anxious.         Physical Exam  Temp:  [36.4 °C (97.5 °F)] 36.4 °C (97.5 °F)  Pulse:  [] 97  Resp:  [18-20] 20  BP: ()/(59-69) 101/68    Physical Exam   Constitutional: She is oriented to person, place, and time. She appears well-developed and well-nourished. No distress.   HENT:   Head: Normocephalic and atraumatic.   Mouth/Throat: No oropharyngeal exudate.   Eyes: Pupils are equal, round, and reactive to light. Conjunctivae are normal. Right eye exhibits no discharge. No scleral icterus.   Neck: Neck supple. No JVD present. No thyromegaly present.   Cervical collar in place   Cardiovascular: Normal rate, regular rhythm and intact distal pulses.    No murmur heard.  Pulmonary/Chest: Effort normal and breath sounds normal. No stridor. No respiratory distress. She has no wheezes. She has no rales.   Abdominal: Soft. Bowel sounds are normal. She exhibits no distension. There is no tenderness. There is no rebound and no guarding.   Musculoskeletal: Normal range of motion. She exhibits edema.   +2 pitting edema bilateral lower extremities, no significant noticeable improvement, SCDs in place    Neurological: She is alert and oriented to person, place, and time. No cranial nerve deficit.   Skin: Skin is warm. She is not diaphoretic. No erythema.   Psychiatric: She has a normal mood and affect. Her behavior is normal. Thought content normal.   Vitals reviewed.      Fluids    Intake/Output Summary (Last 24 hours) at 12/21/18 0841  Last data filed at 12/20/18 2300   Gross per 24 hour   Intake                0 ml   Output                1 ml   Net               -1 ml       Laboratory                        Assessment/Plan  * Cervical myelopathy (HCC)   Assessment & Plan    S/P cervical laminectomy and fusion  Maintain Cervical collar     Anemia   Assessment & Plan    Iron deficient  Resume oral iron supplementation  Upon discharge recommend  outpatient GI appointment for diagnostic colonoscopy       Rash   Assessment & Plan    Completed 7 days of Valtrex for possible herpes zoster rash     Vitamin D deficiency   Assessment & Plan    Continue vitamin D supplementation     Edema   Assessment & Plan    Her echocardiogram showed preserved LVEF of 60%  Lower extremity duplex was negative for DVTs  Resume Lasix, measure urine output  Monitor BP, was borderline but patient asymptomatic        Essential hypertension- (present on admission)   Assessment & Plan    Control patient is only on Lasix which we will continue     History of gastric bypass- (present on admission)   Assessment & Plan    Resume as needed Tums, omeprazole for GI prophylaxis         Patient's CODE STATUS is full code

## 2018-12-21 NOTE — PROGRESS NOTES
Patient is alert uses the call light when assistance is needed and has not attempted to self-transfer so far this shift. Patient has pain that she states is never completely controlled despite interventions. Patient has been up participating in therapies.

## 2018-12-22 PROCEDURE — 700102 HCHG RX REV CODE 250 W/ 637 OVERRIDE(OP): Performed by: PHYSICAL MEDICINE & REHABILITATION

## 2018-12-22 PROCEDURE — 97530 THERAPEUTIC ACTIVITIES: CPT

## 2018-12-22 PROCEDURE — 700111 HCHG RX REV CODE 636 W/ 250 OVERRIDE (IP): Performed by: PHYSICAL MEDICINE & REHABILITATION

## 2018-12-22 PROCEDURE — A9270 NON-COVERED ITEM OR SERVICE: HCPCS | Performed by: PHYSICAL MEDICINE & REHABILITATION

## 2018-12-22 PROCEDURE — 97535 SELF CARE MNGMENT TRAINING: CPT

## 2018-12-22 PROCEDURE — 700102 HCHG RX REV CODE 250 W/ 637 OVERRIDE(OP): Performed by: HOSPITALIST

## 2018-12-22 PROCEDURE — 99232 SBSQ HOSP IP/OBS MODERATE 35: CPT | Performed by: HOSPITALIST

## 2018-12-22 PROCEDURE — A9270 NON-COVERED ITEM OR SERVICE: HCPCS | Performed by: HOSPITALIST

## 2018-12-22 PROCEDURE — 700112 HCHG RX REV CODE 229: Performed by: PHYSICAL MEDICINE & REHABILITATION

## 2018-12-22 PROCEDURE — 97116 GAIT TRAINING THERAPY: CPT

## 2018-12-22 PROCEDURE — 770010 HCHG ROOM/CARE - REHAB SEMI PRIVAT*

## 2018-12-22 PROCEDURE — 97110 THERAPEUTIC EXERCISES: CPT

## 2018-12-22 RX ADMIN — OXYCODONE HYDROCHLORIDE 10 MG: 10 TABLET ORAL at 17:03

## 2018-12-22 RX ADMIN — GABAPENTIN 300 MG: 300 CAPSULE ORAL at 08:50

## 2018-12-22 RX ADMIN — CYANOCOBALAMIN TAB 1000 MCG 1000 MCG: 1000 TAB at 08:50

## 2018-12-22 RX ADMIN — CALCIUM CARBONATE (ANTACID) CHEW TAB 500 MG 500 MG: 500 CHEW TAB at 08:51

## 2018-12-22 RX ADMIN — OXYCODONE HYDROCHLORIDE 10 MG: 10 TABLET ORAL at 09:59

## 2018-12-22 RX ADMIN — VITAMIN D, TAB 1000IU (100/BT) 5000 UNITS: 25 TAB at 08:49

## 2018-12-22 RX ADMIN — POTASSIUM CHLORIDE 20 MEQ: 1500 TABLET, EXTENDED RELEASE ORAL at 08:50

## 2018-12-22 RX ADMIN — METHOCARBAMOL 1000 MG: 500 TABLET ORAL at 21:16

## 2018-12-22 RX ADMIN — FERROUS SULFATE TAB 325 MG (65 MG ELEMENTAL FE) 325 MG: 325 (65 FE) TAB at 08:51

## 2018-12-22 RX ADMIN — GABAPENTIN 300 MG: 300 CAPSULE ORAL at 13:55

## 2018-12-22 RX ADMIN — CALCIUM CARBONATE (ANTACID) CHEW TAB 500 MG 500 MG: 500 CHEW TAB at 21:16

## 2018-12-22 RX ADMIN — DOCUSATE SODIUM 100 MG: 100 CAPSULE, LIQUID FILLED ORAL at 08:50

## 2018-12-22 RX ADMIN — ENOXAPARIN SODIUM 40 MG: 100 INJECTION SUBCUTANEOUS at 08:50

## 2018-12-22 RX ADMIN — SENNOSIDES AND DOCUSATE SODIUM 1 TABLET: 8.6; 5 TABLET ORAL at 08:50

## 2018-12-22 RX ADMIN — METHOCARBAMOL 1000 MG: 500 TABLET ORAL at 17:03

## 2018-12-22 RX ADMIN — MORPHINE SULFATE 30 MG: 30 TABLET, EXTENDED RELEASE ORAL at 17:03

## 2018-12-22 RX ADMIN — OXYCODONE HYDROCHLORIDE AND ACETAMINOPHEN 500 MG: 500 TABLET ORAL at 08:50

## 2018-12-22 RX ADMIN — OXYCODONE HYDROCHLORIDE 10 MG: 10 TABLET ORAL at 13:55

## 2018-12-22 RX ADMIN — ALPRAZOLAM 0.5 MG: 0.5 TABLET ORAL at 21:22

## 2018-12-22 RX ADMIN — FUROSEMIDE 40 MG: 40 TABLET ORAL at 08:50

## 2018-12-22 RX ADMIN — GABAPENTIN 300 MG: 300 CAPSULE ORAL at 21:16

## 2018-12-22 RX ADMIN — MORPHINE SULFATE 30 MG: 30 TABLET, EXTENDED RELEASE ORAL at 05:44

## 2018-12-22 RX ADMIN — OMEPRAZOLE 20 MG: 20 CAPSULE, DELAYED RELEASE ORAL at 08:50

## 2018-12-22 RX ADMIN — DOCUSATE SODIUM 100 MG: 100 CAPSULE, LIQUID FILLED ORAL at 21:16

## 2018-12-22 RX ADMIN — METHOCARBAMOL 1000 MG: 500 TABLET ORAL at 13:55

## 2018-12-22 RX ADMIN — METHOCARBAMOL 1000 MG: 500 TABLET ORAL at 08:49

## 2018-12-22 RX ADMIN — SENNOSIDES AND DOCUSATE SODIUM 1 TABLET: 8.6; 5 TABLET ORAL at 21:16

## 2018-12-22 RX ADMIN — OXYCODONE HYDROCHLORIDE 10 MG: 10 TABLET ORAL at 05:44

## 2018-12-22 ASSESSMENT — PAIN SCALES - GENERAL
PAINLEVEL_OUTOF10: 10
PAINLEVEL_OUTOF10: 10
PAINLEVEL_OUTOF10: ASSUMED PAIN PRESENT
PAINLEVEL_OUTOF10: 9
PAINLEVEL_OUTOF10: 10

## 2018-12-22 ASSESSMENT — ENCOUNTER SYMPTOMS
ORTHOPNEA: 0
FOCAL WEAKNESS: 1
SENSORY CHANGE: 0
DEPRESSION: 0
BACK PAIN: 0
CONSTIPATION: 0
PALPITATIONS: 0
CHILLS: 0
NERVOUS/ANXIOUS: 0
PND: 0
COUGH: 0
ABDOMINAL PAIN: 0
MYALGIAS: 1
BLOOD IN STOOL: 0
FEVER: 0
MEMORY LOSS: 0
VOMITING: 0
BLURRED VISION: 0
HEARTBURN: 0
HEMOPTYSIS: 0
STRIDOR: 0
SPUTUM PRODUCTION: 0
NAUSEA: 0
WEAKNESS: 1
HEADACHES: 0
SPEECH CHANGE: 0
SHORTNESS OF BREATH: 0
TINGLING: 0
PHOTOPHOBIA: 0
DIZZINESS: 0
TREMORS: 0
DOUBLE VISION: 0
EYE PAIN: 0
NECK PAIN: 1
CLAUDICATION: 0
SORE THROAT: 0

## 2018-12-22 ASSESSMENT — GAIT ASSESSMENTS
GAIT LEVEL OF ASSIST: STAND BY ASSIST
DEVIATION: ANTALGIC;STEP TO;BRADYKINETIC;DECREASED HEEL STRIKE;DECREASED TOE OFF
DISTANCE (FEET): 24
ASSISTIVE DEVICE: PARALLEL BARS

## 2018-12-22 NOTE — PROGRESS NOTES
"Rehab Progress Note     Encounter date: 12/21/2018  Today I met with the patient face to face in PT    Chief Complaint:  Cervical myelopathy (HCC) , central line    Interval Events (subjective)  Karine Ovalle continues to make progress gradually.  She denies any fevers, chills, headache, dizziness, chest pain, shortness of breath.  She continues to be frustrated with pain medication scheduling.  I encouraged her to discuss this with her nurse and be proactive.  We discussed the inability to escalate pain medications further due to high doses already.  We discussed discontinuation of her central line today, and she was hesitant to do this.  I explained the infection risk and the need to remove this as soon as possible.      Objective:  VITAL SIGNS: /56   Pulse 84   Temp 36.5 °C (97.7 °F) (Oral)   Resp 17   Ht 1.6 m (5' 3\")   Wt 84.4 kg (186 lb)   LMP  (LMP Unknown)   SpO2 97%   Breastfeeding? No   BMI 32.95 kg/m²     No results found for this or any previous visit (from the past 72 hour(s)).    Current Facility-Administered Medications   Medication Frequency   • methocarbamol (ROBAXIN) tablet 1,000 mg 4X/DAY   • ferrous sulfate tablet 325 mg QDAY with Breakfast   • ascorbic acid tablet 500 mg QDAY with Breakfast   • morphine ER (MS CONTIN) tablet 30 mg BID   • neomycin sulf/polymyx B sulf/HC soln (CORTISPORIN HC SOL) 3.5-53530-9 otic solution 4 Drop PRN   • bisacodyl (DULCOLAX) suppository 10 mg DAILY   • polyethylene glycol/lytes (MIRALAX) PACKET 1 Packet QDAY PRN   • magnesium hydroxide (MILK OF MAGNESIA) suspension 30 mL QDAY PRN   • Respiratory Care per Protocol Continuous RT   • Pharmacy Consult Request ...Pain Management Review 1 Each PRN   • oxyCODONE immediate-release (ROXICODONE) tablet 5 mg Q3HRS PRN   • oxyCODONE immediate release (ROXICODONE) tablet 10 mg Q3HRS PRN   • hydrALAZINE (APRESOLINE) tablet 25 mg Q8HRS PRN   • artificial tears 1.4 % ophthalmic solution 1 Drop PRN   • " benzocaine-menthol (CEPACOL) lozenge 1 Lozenge Q2HRS PRN   • mag hydrox-al hydrox-simeth (MAALOX PLUS ES or MYLANTA DS) suspension 20 mL Q2HRS PRN   • ondansetron (ZOFRAN ODT) dispertab 4 mg 4X/DAY PRN    Or   • ondansetron (ZOFRAN) syringe/vial injection 4 mg 4X/DAY PRN   • traZODone (DESYREL) tablet 50 mg QHS PRN   • sodium chloride (OCEAN) 0.65 % nasal spray 2 Spray PRN   • enoxaparin (LOVENOX) inj 40 mg DAILY   • ALPRAZolam (XANAX) tablet 0.5 mg TID PRN   • calcium carbonate (TUMS) chewable tab 500 mg BID   • docusate sodium (COLACE) capsule 100 mg BID   • promethazine (PHENERGAN) tablet 12.5-25 mg Q4HRS PRN   • vitamin D (cholecalciferol) tablet 5,000 Units DAILY   • cyanocobalamin (VITAMIN B12) tablet 1,000 mcg DAILY   • furosemide (LASIX) tablet 40 mg DAILY   • gabapentin (NEURONTIN) capsule 300 mg TID   • potassium chloride SA (Kdur) tablet 20 mEq DAILY   • normal saline PF 60 mL Q12HRS   • senna-docusate (PERICOLACE or SENOKOT S) 8.6-50 MG per tablet 1 Tab BID   • omeprazole (PRILOSEC) capsule 20 mg DAILY       Exam Date: 12/21/2018    General:  Awake, alert, oriented, no acute distress  HEENT:  Wearing Aspen cervical collar.  Central line in place in the left  Cardiac: regular rate and rhythm  Lungs: clear to auscultation bilaterally.   Abdomen: soft; non tender, non distended, bowel sounds present and normoactive  Extremities: No edema in the bilateral lower limbs  Neuro:   There continues to be variability in manual muscle testing in the right upper limb.  Intermittently, she is able to achieve 3/5 strength in the right elbow flexor and finger flexion remains 4/5        Orders Placed This Encounter   Procedures   • Diet Order Regular (all meats chopped, sandwiches cut in 1/4)     Standing Status:   Standing     Number of Occurrences:   1     Order Specific Question:   Diet:     Answer:   Regular [1]     Comments:   all meats chopped, sandwiches cut in 1/4     Order Specific Question:   Consistency/Fluid  modifications:     Answer:   Thin Liquids [3]       Assessment:  Active Hospital Problems    Diagnosis   • *Cervical myelopathy (HCC)   • Edema   • Vitamin D deficiency   • Rash   • Anemia   • Left hip pain   • Obesity (BMI 30-39.9)   • Primary osteoarthritis of left hip- dr nowak; left THR tbd feb, 2019   • Essential hypertension   • Vitamin B 12 deficiency   • History of gastric bypass       Medical Decision Making and Plan:  Karine Ovalle is a 53-year-old female admitted on December 11 for cervical myelopathy    Cervical Myelopathy/C4 AIS C nontraumatic SCI  Status post 2 stage cervical decompression by Dr. Mendoza with ACDF C3-6 on 12/5/18 and C3-C5 laminectomies with C3-T1 posterior fusion on 12/7/18   Continue comprehensive rehabilitation  Continue cervical collar at all times with brief removal for shower and meals    Continuing to work on conference of therapy while awaiting skilled nursing facility placement    Discontinuing central line today    Chronic pain  Postoperative pain  Tylenol 1000 mg every 6 hours  Gabapentin 300 mg 3 times a day  Robaxin 1000 mg 4 times a day  MS Contin 30 mg twice a day  Oxycodone 5-10 mg every 3 hours as needed    Patient did not tolerate oxycodone weaning  She will need to follow-up with her outpatient pain provider for discharge prescriptions    She has utilized 40mg of oxycodone in the last 24 hours     Dysphagia --resolved  Speech and language pathology advanced diet to regular with thins     Neurogenic bladder  History of incontinence and numbness prior to surgery  Ongoing incontinence    Timed voids to minimize incontinent episodes  Urgency likely worsened by Lasix, but she has significant lower limb edema     Neurogenic Bowel  Opioid-induced constipation  Dulcolax suppository daily  Colace 100 mg twice a day  Milk of magnesia as needed  MiraLAX as needed  Milagro-Colace 1 tab twice daily    Lower limb edema  Hypokalemia  Appreciate hospitalist assistance  Lasix 40 mg  daily  Potassium chloride 20 mEq daily    Lower limb edema gradually improving    Anemia   Continue B12 and iron  Hemoglobin of 9.0 on December 16  Continue to monitor    Internal medicine has recommended outpatient colonoscopy to follow-up for iron deficiency anemia     Left buttock lesion  This was present on admission  Hospitalist believes likely herpes simplex versus HPV  Valacyclovir until December 19     Vitamin B12 deficiency  Vitamin D deficiency  Continue B12 supplementation  Vitamin D was 14 on admission, so continue supplementation with 5000 units daily     DVT prophylaxis  40 mg Lovenox daily     Estimated discharge: She will need skilled nursing due to slow progress, lack of family support      Total time:  25 minutes.  I spent greater than 50% of the time for patient care, counseling, and coordination on this date, including unit/floor time, and face-to-face time with the patient as per interval events and assessment and plan above. Topics discussed included functional progress, central line discontinuation, skilled nursing referral.  Discussed with case management.  Discussed with nursing      Brice Harvey M.D.  12/21/2018

## 2018-12-22 NOTE — PROGRESS NOTES
Hospital Medicine Daily Progress Note        Chief Complaint  Edema    Interval Problem Update  12/17: Leg swelling better.  12/18: Waiting to go to the bathroom.   12/19: Blood pressure unremarkable.  Completing valacyclovir.  Patient is asking for small SCDs as well as concurrent dosing of oxycodone and MS Contin which she tolerated this morning.  Discussed with rehab Dr. Kimble.    12/20  No acute issues overnight patient restless otherwise comfortable watching TV.  Was asking for smaller SCDs otherwise denies having any chest pain shortness of breath or nausea vomiting. Her overall pain is well controlled on current regimen.  Continue Lasix for diuresis  Continue rehab per Dr. Harvey    12/21  Overall in good spirits, was discussing about her SCDs not fitting perfectly, reassurance was provided. Progressing well overall  Continue with exercises.   Resume lasix, note borderline BP, however patient asymptomatic.    12/22  In good spirits, no acute complaints, doing well overall. Says her pain is better managed.   Resume lasix despite noted incontinence  Continue with therapy.  Check bmp to make sure electrolytes stable     Review of Systems  Review of Systems   Constitutional: Positive for malaise/fatigue. Negative for chills and fever.   HENT: Negative for congestion, hearing loss, sore throat and tinnitus.    Eyes: Negative for blurred vision, double vision, photophobia and pain.   Respiratory: Negative for cough, hemoptysis, sputum production, shortness of breath and stridor.    Cardiovascular: Positive for leg swelling (slowly improving). Negative for chest pain, palpitations, orthopnea, claudication and PND.   Gastrointestinal: Negative for abdominal pain, blood in stool, constipation, heartburn, melena, nausea and vomiting.   Genitourinary: Negative for dysuria, frequency and urgency.        Incontinence    Musculoskeletal: Positive for joint pain, myalgias (overall less) and neck pain. Negative for back  pain.   Skin: Negative for rash.   Neurological: Positive for focal weakness and weakness. Negative for dizziness, tingling, tremors, sensory change, speech change and headaches.   Psychiatric/Behavioral: Negative for depression, memory loss and suicidal ideas. The patient is not nervous/anxious.         Physical Exam  Temp:  [37 °C (98.6 °F)-37.4 °C (99.3 °F)] 37 °C (98.6 °F)  Pulse:  [78-91] 78  Resp:  [18-20] 20  BP: (139)/(72) 139/72    Physical Exam   Constitutional: She is oriented to person, place, and time. She appears well-developed and well-nourished. No distress.   HENT:   Head: Normocephalic and atraumatic.   Mouth/Throat: No oropharyngeal exudate.   Eyes: Pupils are equal, round, and reactive to light. Conjunctivae are normal. Right eye exhibits no discharge. No scleral icterus.   Neck: Neck supple. No JVD present. No thyromegaly present.   Cervical collar in place   Cardiovascular: Normal rate, regular rhythm and intact distal pulses.    No murmur heard.  Pulmonary/Chest: Effort normal and breath sounds normal. No stridor. No respiratory distress. She has no wheezes. She has no rales.   Abdominal: Soft. Bowel sounds are normal. She exhibits no distension. There is no tenderness. There is no rebound and no guarding.   Musculoskeletal: Normal range of motion. She exhibits edema.   +2 pitting edema bilateral lower extremities, gradual improvement,    Neurological: She is alert and oriented to person, place, and time. No cranial nerve deficit.   Skin: Skin is warm. She is not diaphoretic. No erythema.   Psychiatric: She has a normal mood and affect. Her behavior is normal. Thought content normal.   Vitals reviewed.      Fluids    Intake/Output Summary (Last 24 hours) at 12/22/18 0828  Last data filed at 12/21/18 2048   Gross per 24 hour   Intake             1190 ml   Output                0 ml   Net             1190 ml       Laboratory                        Assessment/Plan  * Cervical myelopathy (HCC)    Assessment & Plan    S/P cervical laminectomy and fusion  Maintain Cervical collar     Anemia   Assessment & Plan    Iron deficient  Resume oral iron supplementation  Upon discharge recommend outpatient GI appointment for diagnostic colonoscopy     Rash   Assessment & Plan    Completed 7 days of Valtrex for possible herpes zoster rash     Vitamin D deficiency   Assessment & Plan    Continue vitamin D supplementation     Edema   Assessment & Plan    Her echocardiogram showed preserved LVEF of 60%  Lower extremity duplex was negative for DVTs  Resume Lasix,  Difficult to monitor urine output since patient has incontinence   Monitor BP, was borderline but patient asymptomatic        Essential hypertension- (present on admission)   Assessment & Plan    Controlled  Only on lasix, will continue.      History of gastric bypass- (present on admission)   Assessment & Plan    Resume as needed Tums, omeprazole for GI prophylaxis         Patient's CODE STATUS is full code

## 2018-12-22 NOTE — PROGRESS NOTES
After multiple attempts, unable to draw blood for ordered labs, Dr SHANIKA reddy, reordered for tomorrow am at 0300.

## 2018-12-23 LAB
ALBUMIN SERPL BCP-MCNC: 3.3 G/DL (ref 3.2–4.9)
ALBUMIN/GLOB SERPL: 1 G/DL
ALP SERPL-CCNC: 106 U/L (ref 30–99)
ALT SERPL-CCNC: 10 U/L (ref 2–50)
ANION GAP SERPL CALC-SCNC: 10 MMOL/L (ref 0–11.9)
AST SERPL-CCNC: 14 U/L (ref 12–45)
BILIRUB SERPL-MCNC: 0.3 MG/DL (ref 0.1–1.5)
BUN SERPL-MCNC: 7 MG/DL (ref 8–22)
CALCIUM SERPL-MCNC: 9.8 MG/DL (ref 8.5–10.5)
CHLORIDE SERPL-SCNC: 107 MMOL/L (ref 96–112)
CO2 SERPL-SCNC: 27 MMOL/L (ref 20–33)
CREAT SERPL-MCNC: 0.57 MG/DL (ref 0.5–1.4)
ERYTHROCYTE [DISTWIDTH] IN BLOOD BY AUTOMATED COUNT: 50.5 FL (ref 35.9–50)
GLOBULIN SER CALC-MCNC: 3.2 G/DL (ref 1.9–3.5)
GLUCOSE SERPL-MCNC: 99 MG/DL (ref 65–99)
HCT VFR BLD AUTO: 30.6 % (ref 37–47)
HGB BLD-MCNC: 10.1 G/DL (ref 12–16)
MCH RBC QN AUTO: 25.3 PG (ref 27–33)
MCHC RBC AUTO-ENTMCNC: 33 G/DL (ref 33.6–35)
MCV RBC AUTO: 76.7 FL (ref 81.4–97.8)
PLATELET # BLD AUTO: 421 K/UL (ref 164–446)
PMV BLD AUTO: 9.8 FL (ref 9–12.9)
POTASSIUM SERPL-SCNC: 3.9 MMOL/L (ref 3.6–5.5)
PROT SERPL-MCNC: 6.5 G/DL (ref 6–8.2)
RBC # BLD AUTO: 3.99 M/UL (ref 4.2–5.4)
SODIUM SERPL-SCNC: 144 MMOL/L (ref 135–145)
WBC # BLD AUTO: 7.8 K/UL (ref 4.8–10.8)

## 2018-12-23 PROCEDURE — 700102 HCHG RX REV CODE 250 W/ 637 OVERRIDE(OP): Performed by: HOSPITALIST

## 2018-12-23 PROCEDURE — 700102 HCHG RX REV CODE 250 W/ 637 OVERRIDE(OP): Performed by: PHYSICAL MEDICINE & REHABILITATION

## 2018-12-23 PROCEDURE — 700111 HCHG RX REV CODE 636 W/ 250 OVERRIDE (IP): Performed by: PHYSICAL MEDICINE & REHABILITATION

## 2018-12-23 PROCEDURE — A9270 NON-COVERED ITEM OR SERVICE: HCPCS | Performed by: PHYSICAL MEDICINE & REHABILITATION

## 2018-12-23 PROCEDURE — A9270 NON-COVERED ITEM OR SERVICE: HCPCS | Performed by: HOSPITALIST

## 2018-12-23 PROCEDURE — 700112 HCHG RX REV CODE 229: Performed by: PHYSICAL MEDICINE & REHABILITATION

## 2018-12-23 PROCEDURE — 85027 COMPLETE CBC AUTOMATED: CPT

## 2018-12-23 PROCEDURE — 36415 COLL VENOUS BLD VENIPUNCTURE: CPT

## 2018-12-23 PROCEDURE — 99232 SBSQ HOSP IP/OBS MODERATE 35: CPT | Performed by: HOSPITALIST

## 2018-12-23 PROCEDURE — 770010 HCHG ROOM/CARE - REHAB SEMI PRIVAT*

## 2018-12-23 PROCEDURE — 80053 COMPREHEN METABOLIC PANEL: CPT

## 2018-12-23 RX ADMIN — GABAPENTIN 300 MG: 300 CAPSULE ORAL at 09:43

## 2018-12-23 RX ADMIN — FERROUS SULFATE TAB 325 MG (65 MG ELEMENTAL FE) 325 MG: 325 (65 FE) TAB at 09:43

## 2018-12-23 RX ADMIN — OMEPRAZOLE 20 MG: 20 CAPSULE, DELAYED RELEASE ORAL at 09:44

## 2018-12-23 RX ADMIN — SENNOSIDES AND DOCUSATE SODIUM 1 TABLET: 8.6; 5 TABLET ORAL at 09:43

## 2018-12-23 RX ADMIN — METHOCARBAMOL 1000 MG: 500 TABLET ORAL at 09:43

## 2018-12-23 RX ADMIN — OXYCODONE HYDROCHLORIDE 10 MG: 10 TABLET ORAL at 06:06

## 2018-12-23 RX ADMIN — METHOCARBAMOL 1000 MG: 500 TABLET ORAL at 19:55

## 2018-12-23 RX ADMIN — GABAPENTIN 300 MG: 300 CAPSULE ORAL at 15:46

## 2018-12-23 RX ADMIN — DOCUSATE SODIUM 100 MG: 100 CAPSULE, LIQUID FILLED ORAL at 09:43

## 2018-12-23 RX ADMIN — POTASSIUM CHLORIDE 20 MEQ: 1500 TABLET, EXTENDED RELEASE ORAL at 09:43

## 2018-12-23 RX ADMIN — CALCIUM CARBONATE (ANTACID) CHEW TAB 500 MG 500 MG: 500 CHEW TAB at 19:55

## 2018-12-23 RX ADMIN — CALCIUM CARBONATE (ANTACID) CHEW TAB 500 MG 500 MG: 500 CHEW TAB at 09:43

## 2018-12-23 RX ADMIN — SENNOSIDES AND DOCUSATE SODIUM 1 TABLET: 8.6; 5 TABLET ORAL at 19:55

## 2018-12-23 RX ADMIN — METHOCARBAMOL 1000 MG: 500 TABLET ORAL at 17:30

## 2018-12-23 RX ADMIN — OXYCODONE HYDROCHLORIDE AND ACETAMINOPHEN 500 MG: 500 TABLET ORAL at 09:43

## 2018-12-23 RX ADMIN — OXYCODONE HYDROCHLORIDE 10 MG: 10 TABLET ORAL at 19:55

## 2018-12-23 RX ADMIN — ALPRAZOLAM 0.5 MG: 0.5 TABLET ORAL at 19:56

## 2018-12-23 RX ADMIN — DOCUSATE SODIUM 100 MG: 100 CAPSULE, LIQUID FILLED ORAL at 19:55

## 2018-12-23 RX ADMIN — GABAPENTIN 300 MG: 300 CAPSULE ORAL at 19:55

## 2018-12-23 RX ADMIN — MORPHINE SULFATE 30 MG: 30 TABLET, EXTENDED RELEASE ORAL at 06:06

## 2018-12-23 RX ADMIN — VITAMIN D, TAB 1000IU (100/BT) 5000 UNITS: 25 TAB at 09:42

## 2018-12-23 RX ADMIN — FUROSEMIDE 40 MG: 40 TABLET ORAL at 09:43

## 2018-12-23 RX ADMIN — METHOCARBAMOL 1000 MG: 500 TABLET ORAL at 13:18

## 2018-12-23 RX ADMIN — OXYCODONE HYDROCHLORIDE 10 MG: 10 TABLET ORAL at 13:18

## 2018-12-23 RX ADMIN — CYANOCOBALAMIN TAB 1000 MCG 1000 MCG: 1000 TAB at 09:43

## 2018-12-23 RX ADMIN — ENOXAPARIN SODIUM 40 MG: 100 INJECTION SUBCUTANEOUS at 09:51

## 2018-12-23 RX ADMIN — MORPHINE SULFATE 30 MG: 30 TABLET, EXTENDED RELEASE ORAL at 17:30

## 2018-12-23 ASSESSMENT — ENCOUNTER SYMPTOMS
MEMORY LOSS: 0
STRIDOR: 0
TREMORS: 0
PALPITATIONS: 0
SENSORY CHANGE: 0
FEVER: 0
SPUTUM PRODUCTION: 0
NECK PAIN: 1
SPEECH CHANGE: 0
ABDOMINAL PAIN: 0
MYALGIAS: 1
NAUSEA: 0
SHORTNESS OF BREATH: 0
DOUBLE VISION: 0
BACK PAIN: 0
CONSTIPATION: 0
SORE THROAT: 0
TINGLING: 0
PHOTOPHOBIA: 0
CHILLS: 0
COUGH: 0
HEMOPTYSIS: 0
BLURRED VISION: 0
EYE PAIN: 0
HEADACHES: 0
FOCAL WEAKNESS: 1
BLOOD IN STOOL: 0
PND: 0
DEPRESSION: 0
ORTHOPNEA: 0
NERVOUS/ANXIOUS: 0
WEAKNESS: 1
HEARTBURN: 0
CLAUDICATION: 0
VOMITING: 0
DIZZINESS: 0

## 2018-12-23 ASSESSMENT — PAIN SCALES - GENERAL
PAINLEVEL_OUTOF10: 8
PAINLEVEL_OUTOF10: 10
PAINLEVEL_OUTOF10: 10
PAINLEVEL_OUTOF10: 9
PAINLEVEL_OUTOF10: 7

## 2018-12-23 NOTE — PROGRESS NOTES
Hospital Medicine Daily Progress Note        Chief Complaint  Edema    Interval Problem Update  12/17: Leg swelling better.  12/18: Waiting to go to the bathroom.   12/19: Blood pressure unremarkable.  Completing valacyclovir.  Patient is asking for small SCDs as well as concurrent dosing of oxycodone and MS Contin which she tolerated this morning.  Discussed with rehab Dr. Kimble.    12/20  No acute issues overnight patient restless otherwise comfortable watching TV.  Was asking for smaller SCDs otherwise denies having any chest pain shortness of breath or nausea vomiting. Her overall pain is well controlled on current regimen.  Continue Lasix for diuresis  Continue rehab per Dr. Harvey    12/21  Overall in good spirits, was discussing about her SCDs not fitting perfectly, reassurance was provided. Progressing well overall  Continue with exercises.   Resume lasix, note borderline BP, however patient asymptomatic.    12/22  In good spirits, no acute complaints, doing well overall. Says her pain is better managed.   Resume lasix despite noted incontinence  Continue with therapy.  Check bmp to make sure electrolytes stable     12/23  Patient was complaining of left hip discomfort, said her therapy is too intensive and her orthopaedic didn't want her to put that much pressure on it. Otherwise Patient denies fevers/chills, chest pain, shortness of breath or nausea/vommiting.   Continue therapy with above consideration.  Anemia has improved increase Hgb.    Review of Systems  Review of Systems   Constitutional: Positive for malaise/fatigue. Negative for chills and fever.   HENT: Negative for congestion, hearing loss, sore throat and tinnitus.    Eyes: Negative for blurred vision, double vision, photophobia and pain.   Respiratory: Negative for cough, hemoptysis, sputum production, shortness of breath and stridor.    Cardiovascular: Positive for leg swelling (improving). Negative for chest pain, palpitations,  orthopnea, claudication and PND.   Gastrointestinal: Negative for abdominal pain, blood in stool, constipation, heartburn, melena, nausea and vomiting.   Genitourinary: Negative for dysuria, frequency and urgency.        Incontinence    Musculoskeletal: Positive for joint pain (left hip), myalgias (more in left hip area) and neck pain. Negative for back pain.   Skin: Negative for rash.   Neurological: Positive for focal weakness and weakness. Negative for dizziness, tingling, tremors, sensory change, speech change and headaches.   Psychiatric/Behavioral: Negative for depression, memory loss and suicidal ideas. The patient is not nervous/anxious.         Physical Exam  Temp:  [36.7 °C (98.1 °F)-37.2 °C (98.9 °F)] 37.2 °C (98.9 °F)  Pulse:  [] 98  Resp:  [18-20] 20  BP: (112-130)/(70-81) 130/81    Physical Exam   Constitutional: She is oriented to person, place, and time. She appears well-developed and well-nourished. No distress.   HENT:   Head: Normocephalic and atraumatic.   Mouth/Throat: No oropharyngeal exudate.   Eyes: Pupils are equal, round, and reactive to light. Conjunctivae are normal. Right eye exhibits no discharge. No scleral icterus.   Neck: Neck supple. No JVD present. No thyromegaly present.   Cervical collar in place   Cardiovascular: Normal rate, regular rhythm and intact distal pulses.    No murmur heard.  Pulmonary/Chest: Effort normal and breath sounds normal. No stridor. No respiratory distress. She has no wheezes. She has no rales.   Abdominal: Soft. Bowel sounds are normal. She exhibits no distension. There is no tenderness. There is no rebound and no guarding.   Musculoskeletal: Normal range of motion. She exhibits edema (+1/2-1 pitting edema b/l L/E).   +2 pitting edema bilateral lower extremities, gradual improvement,    Neurological: She is alert and oriented to person, place, and time. No cranial nerve deficit.   Skin: Skin is warm. She is not diaphoretic. No erythema.    Psychiatric: She has a normal mood and affect. Her behavior is normal. Thought content normal.   Nursing note and vitals reviewed.      Fluids    Intake/Output Summary (Last 24 hours) at 12/23/18 0758  Last data filed at 12/23/18 0606   Gross per 24 hour   Intake              720 ml   Output                0 ml   Net              720 ml       Laboratory  Recent Labs      12/23/18   0630   WBC  7.8   RBC  3.99*   HEMOGLOBIN  10.1*   HEMATOCRIT  30.6*   MCV  76.7*   MCH  25.3*   MCHC  33.0*   RDW  50.5*   PLATELETCT  421   MPV  9.8     Recent Labs      12/23/18   0630   SODIUM  144   POTASSIUM  3.9   CHLORIDE  107   CO2  27   GLUCOSE  99   BUN  7*   CREATININE  0.57   CALCIUM  9.8                   Assessment/Plan  * Cervical myelopathy (HCC)   Assessment & Plan    S/P cervical laminectomy and fusion  Maintain Cervical collar     Anemia   Assessment & Plan    Note Iron deficiency  Continue with PO iron supplementation  Upon discharge recommend outpatient GI appointment for diagnostic colonoscopy     Rash   Assessment & Plan    Completed 7 days of Valtrex for possible herpes zoster rash     Vitamin D deficiency   Assessment & Plan    Continue vitamin D supplementation     Edema   Assessment & Plan    Her echocardiogram showed preserved LVEF of 60%  Lower extremity duplex was negative for DVTs  Resume Lasix at current dose,   Difficult to monitor urine output since patient has incontinence   Blood pressure fluctuating but overall controlled.        Essential hypertension- (present on admission)   Assessment & Plan    Controlled  Only on lasix, continue at current dose      History of gastric bypass- (present on admission)   Assessment & Plan    Resume as needed Tums  Continue with omeprazole for GI prophylaxis         Patient's CODE STATUS is full code    Total time:  33 minutes.  I spent greater than 50% of the time for patient care, counseling, and coordination on this date, including unit/floor time, and  face-to-face time with the patient as per interval events and assessment and plan above

## 2018-12-23 NOTE — PROGRESS NOTES
Patient is AOx4 has good safety awareness and uses the call light when assistance is needed. Patient has pain that she states is moderately controlled. Patient has been up participating in therapies.

## 2018-12-24 PROCEDURE — 99232 SBSQ HOSP IP/OBS MODERATE 35: CPT | Performed by: HOSPITALIST

## 2018-12-24 PROCEDURE — 97110 THERAPEUTIC EXERCISES: CPT

## 2018-12-24 PROCEDURE — 97530 THERAPEUTIC ACTIVITIES: CPT

## 2018-12-24 PROCEDURE — A9270 NON-COVERED ITEM OR SERVICE: HCPCS | Performed by: PHYSICAL MEDICINE & REHABILITATION

## 2018-12-24 PROCEDURE — 700102 HCHG RX REV CODE 250 W/ 637 OVERRIDE(OP): Performed by: PHYSICAL MEDICINE & REHABILITATION

## 2018-12-24 PROCEDURE — A9270 NON-COVERED ITEM OR SERVICE: HCPCS | Performed by: HOSPITALIST

## 2018-12-24 PROCEDURE — 770010 HCHG ROOM/CARE - REHAB SEMI PRIVAT*

## 2018-12-24 PROCEDURE — 700111 HCHG RX REV CODE 636 W/ 250 OVERRIDE (IP): Performed by: PHYSICAL MEDICINE & REHABILITATION

## 2018-12-24 PROCEDURE — 99233 SBSQ HOSP IP/OBS HIGH 50: CPT | Performed by: PHYSICAL MEDICINE & REHABILITATION

## 2018-12-24 PROCEDURE — 97535 SELF CARE MNGMENT TRAINING: CPT

## 2018-12-24 PROCEDURE — 700102 HCHG RX REV CODE 250 W/ 637 OVERRIDE(OP): Performed by: HOSPITALIST

## 2018-12-24 PROCEDURE — 700112 HCHG RX REV CODE 229: Performed by: PHYSICAL MEDICINE & REHABILITATION

## 2018-12-24 RX ORDER — OXYCODONE HYDROCHLORIDE 5 MG/1
5 TABLET ORAL EVERY 4 HOURS PRN
Status: DISCONTINUED | OUTPATIENT
Start: 2018-12-24 | End: 2018-12-26 | Stop reason: HOSPADM

## 2018-12-24 RX ORDER — OXYCODONE HYDROCHLORIDE 10 MG/1
10 TABLET ORAL EVERY 4 HOURS PRN
Status: DISCONTINUED | OUTPATIENT
Start: 2018-12-24 | End: 2018-12-26 | Stop reason: HOSPADM

## 2018-12-24 RX ADMIN — DOCUSATE SODIUM 100 MG: 100 CAPSULE, LIQUID FILLED ORAL at 08:12

## 2018-12-24 RX ADMIN — MORPHINE SULFATE 30 MG: 30 TABLET, EXTENDED RELEASE ORAL at 17:35

## 2018-12-24 RX ADMIN — GABAPENTIN 300 MG: 300 CAPSULE ORAL at 21:41

## 2018-12-24 RX ADMIN — OMEPRAZOLE 20 MG: 20 CAPSULE, DELAYED RELEASE ORAL at 08:12

## 2018-12-24 RX ADMIN — CALCIUM CARBONATE (ANTACID) CHEW TAB 500 MG 500 MG: 500 CHEW TAB at 08:12

## 2018-12-24 RX ADMIN — METHOCARBAMOL 1000 MG: 500 TABLET ORAL at 08:12

## 2018-12-24 RX ADMIN — CYANOCOBALAMIN TAB 1000 MCG 1000 MCG: 1000 TAB at 08:12

## 2018-12-24 RX ADMIN — VITAMIN D, TAB 1000IU (100/BT) 5000 UNITS: 25 TAB at 08:12

## 2018-12-24 RX ADMIN — POTASSIUM CHLORIDE 20 MEQ: 1500 TABLET, EXTENDED RELEASE ORAL at 08:12

## 2018-12-24 RX ADMIN — OXYCODONE HYDROCHLORIDE 10 MG: 10 TABLET ORAL at 11:22

## 2018-12-24 RX ADMIN — ALPRAZOLAM 0.5 MG: 0.5 TABLET ORAL at 21:41

## 2018-12-24 RX ADMIN — SENNOSIDES AND DOCUSATE SODIUM 1 TABLET: 8.6; 5 TABLET ORAL at 08:12

## 2018-12-24 RX ADMIN — FERROUS SULFATE TAB 325 MG (65 MG ELEMENTAL FE) 325 MG: 325 (65 FE) TAB at 08:12

## 2018-12-24 RX ADMIN — GABAPENTIN 300 MG: 300 CAPSULE ORAL at 08:12

## 2018-12-24 RX ADMIN — ENOXAPARIN SODIUM 40 MG: 100 INJECTION SUBCUTANEOUS at 08:13

## 2018-12-24 RX ADMIN — OXYCODONE HYDROCHLORIDE 10 MG: 10 TABLET ORAL at 21:41

## 2018-12-24 RX ADMIN — CALCIUM CARBONATE (ANTACID) CHEW TAB 500 MG 500 MG: 500 CHEW TAB at 21:40

## 2018-12-24 RX ADMIN — SENNOSIDES AND DOCUSATE SODIUM 1 TABLET: 8.6; 5 TABLET ORAL at 21:41

## 2018-12-24 RX ADMIN — GABAPENTIN 300 MG: 300 CAPSULE ORAL at 14:33

## 2018-12-24 RX ADMIN — METHOCARBAMOL 1000 MG: 500 TABLET ORAL at 21:41

## 2018-12-24 RX ADMIN — FUROSEMIDE 40 MG: 40 TABLET ORAL at 08:12

## 2018-12-24 RX ADMIN — MORPHINE SULFATE 30 MG: 30 TABLET, EXTENDED RELEASE ORAL at 05:56

## 2018-12-24 RX ADMIN — OXYCODONE HYDROCHLORIDE AND ACETAMINOPHEN 500 MG: 500 TABLET ORAL at 08:12

## 2018-12-24 RX ADMIN — METHOCARBAMOL 1000 MG: 500 TABLET ORAL at 14:33

## 2018-12-24 RX ADMIN — DOCUSATE SODIUM 100 MG: 100 CAPSULE, LIQUID FILLED ORAL at 21:41

## 2018-12-24 RX ADMIN — OXYCODONE HYDROCHLORIDE 10 MG: 10 TABLET ORAL at 05:56

## 2018-12-24 RX ADMIN — METHOCARBAMOL 1000 MG: 500 TABLET ORAL at 17:35

## 2018-12-24 ASSESSMENT — ENCOUNTER SYMPTOMS
FOCAL WEAKNESS: 1
EYE PAIN: 0
NECK PAIN: 1
SPUTUM PRODUCTION: 0
SPEECH CHANGE: 0
DIZZINESS: 0
CHILLS: 0
TINGLING: 0
VOMITING: 0
SHORTNESS OF BREATH: 0
CONSTIPATION: 0
COUGH: 0
ORTHOPNEA: 0
SORE THROAT: 0
NERVOUS/ANXIOUS: 0
NAUSEA: 0
PHOTOPHOBIA: 0
SENSORY CHANGE: 0
HEADACHES: 0
DEPRESSION: 0
WEAKNESS: 1
STRIDOR: 0
MYALGIAS: 1
TREMORS: 0
ABDOMINAL PAIN: 0
HEMOPTYSIS: 0
CLAUDICATION: 0
FEVER: 0
BLOOD IN STOOL: 0
MEMORY LOSS: 0
HEARTBURN: 0
BLURRED VISION: 0
BACK PAIN: 0
PND: 0
DOUBLE VISION: 0
PALPITATIONS: 0

## 2018-12-24 ASSESSMENT — PAIN SCALES - GENERAL
PAINLEVEL_OUTOF10: ASSUMED PAIN PRESENT
PAINLEVEL_OUTOF10: 8
PAINLEVEL_OUTOF10: 6
PAINLEVEL_OUTOF10: 7
PAINLEVEL_OUTOF10: 8
PAINLEVEL_OUTOF10: 8

## 2018-12-24 NOTE — DISCHARGE PLANNING
Case Management;  Met with patient this am.  [facility] has declined patient after second review.  [facility] is still pending insurance auth.  I have provided update to patient.  She is perseverating on [facility] and the reason they will not accept her.  I have encouraged her to move past this as we have done the second referral to them with the same decline.  Will proceed with [facility] per our discussion when insurance auth obtained.

## 2018-12-24 NOTE — DISCHARGE PLANNING
Case Management;  Met patient and provided latest update on skilled referral.  Insurance not available today, so [facility] still does not have auth for transfer.  Will follow after the holiday.

## 2018-12-24 NOTE — REHAB-CM IDT TEAM NOTE
Case Management    DC Planning  DC destination/dispostion:  Patient lives alone in a single level apartment.     DC Needs:  She has a fww, w/c, spc and tub bench.  She also has sequential compression device. Her significant other is supportive but lives in [place].  He will be out of the country for a few weeks. She has supportive friends.  We have completed paperwork for her disability insurance and this has been faxed. She will likely need transition to skilled and referrals have been made.     Barriers to discharge:   Lives alone.  Patient is very distracted and has poor focus.     Strengths:      Section completed by:  Briana Herzog R.N.

## 2018-12-24 NOTE — REHAB-PHARMACY IDT TEAM NOTE
Pharmacy   Pharmacy2  Antibiotics/Antifungals/Antivirals:  Medication:      Active Orders     None        Route:         n/a  Stop Date:  n/a  Reason:   Antihypertensives/Cardiac:  Medication:    Orders (72h ago through future)    Start     Ordered    12/12/18 0900  furosemide (LASIX) tablet 40 mg  DAILY      12/11/18 1132    12/11/18 1132  hydrALAZINE (APRESOLINE) tablet 25 mg  EVERY 8 HOURS PRN      12/11/18 1133        Patient Vitals for the past 24 hrs:   BP Pulse   12/23/18 1855 113/76 (!) 101   12/23/18 1505 (!) 96/65 90       Anticoagulation:  Medication:  Lovenox  INR:      Other key medications: gabapentin, methocarbamol, omeprazole    Section completed by:  Susana Greer RP

## 2018-12-24 NOTE — REHAB-NURSING IDT TEAM NOTE
Nursing   Nursing  Diet/Nutrition: regular thin liquids, all meats chopped cut sandwiches in 1/4.    Medication Administration:  Crushed most, float those unable to crush, administere in pudding with sprite chaser.  % consumed at meals in last 24 hours:  Consumed % of meals per documentation.  Meal/Snack Consumption for the past 24 hrs:   Oral Nutrition   12/23/18 1900 Dinner;Between % Consumed       Snack schedule:  0600, 2100, 0000.  Supplement:  Other: pudding  Appetite:  Good  Fluid Intake/Output in past 24 hours: In: 240 [P.O.:240]  Out: -   Admit Weight:  Weight: 84.4 kg (186 lb)  Weight Last 7 Days   [83.2 kg (183 lb 6.4 oz)] 83.2 kg (183 lb 6.4 oz) (12/23 1615)    Pain Issues:    Location:  Back;Neck;Hip (12/23 1315)  Right;Left (12/23 1315)         Severity:  Severe   Scheduled pain medications:  gabapentin (NEURONTIN) , oxycodone CR (OXYCONTIN)  and oxycodone (ROXICODONE)      PRN pain medications used in last 24 hours:  oxycodone immediate release (ROXICODONE)    Non Pharmacologic Interventions:  distraction, emotional support, environmental changes and repositioned  Effectiveness of pain management plan:  fair=improved comfort with interventions but does not always meet goal    Bowel:    Bowel Assist Initial Score:  1 - Total Assistance  Bowel Assist Current Score:  4 - Minimal Assistance  Bowl Accidents in last 7 days: 0    Last bowel movement: 12/22/18  Stool Description: Large, Loose, Brown     Usual bowel pattern:  every other day  Scheduled bowel medications:  senna-docusate (PERICOLACE or SENOKOT S)   PRN bowel medications used in last 24 hours:  None  Nursing Interventions:  Scheduled medication, Supervision, Verbal cueing, Positioning on commode/toilet  Effectiveness of bowel program:   good=regular, predictable, controlled emptying of bowel  Bladder:    Bladder Assist Initial Score:  1 - Total Assistance  Bladder Assist Current Score:  2 - Max Assistance  Bladder Accidents in last 7 days:   2  PVR range for past 24-48 hours: -- ()  Intermittent Catheterization:  N/A  Medications affecting bladder:  None    Time void schedule/voiding pattern:  Voiding every 1-2 hours after lasix, approx q 3 hours after lasix wears off.  Interventions:  Increased time, Supervision, Verbal cueing, Brief  Effectiveness of bladder training:  Poor=Continues to have bladder accidents    Su:    Type:     Patient Lines/Drains/Airways Status    Active Catheter     None              Date placed:          Justification:    Diabetes:  Blood Sugar Frequency:  None    Range of BS for last 48 hours:       Scheduled diabetic medications:  None  Sliding scale usage in past 24 hours:  No  Total Short acting insulin in the past 24 hours:  None  Total Long acting insulin in the past 24 hours:  None    Wound:         Patient Lines/Drains/Airways Status    Active Current Wounds     None                   Interventions:   Effectiveness of intervention:  wound is improving     Wound Vac Location:  Not applicable  Dressing last changed:  12/23/2018  Pump Mode Pressure Setting       Description of drainage:  none    Sleep/wake cycle:   Average hours slept:  Sleeps 4-6 hours without waking  Sleep medication usage:  Other PRN trazadone available, has not taken.    Patient/Family Training/Education:  Bladder Management/Training, Bowel Management/Training, Fall Prevention, General Self Care, Medication Management, Pain Management, Safe Transfers and Safety  Discharge Supply Recommendations:Other: extra pads for aspen collar  Strengths: Alert and oriented and Able to follow instructions   Barriers:   Bladder incontinence, Generalized weakness and Limited mobility            Nursing Problems           Problem: Bowel/Gastric:     Goal: Normal bowel function is maintained or improved     Flowsheet:     Taken at 12/23/18 2152    Last BM 12/22/18 by Nidia Christine R.N.    Taken at 12/11/18 1210    Last BM 12/08/18 by Kelsie Tucker R.N.  Comment:  per report                Goal: Will not experience complications related to bowel motility             Problem: Communication     Goal: The ability to communicate needs accurately and effectively will improve             Problem: Discharge Barriers/Planning     Goal: Patient's continuum of care needs will be met             Problem: Infection     Goal: Will remain free from infection             Problem: Knowledge Deficit     Goal: Knowledge of disease process/condition, treatment plan, diagnostic tests, and medications will improve           Goal: Knowledge of the prescribed therapeutic regimen will improve             Problem: Mobility     Goal: Risk for activity intolerance will decrease             Problem: Pain Management     Goal: Pain level will decrease to patient's comfort goal     Flowsheet:     Taken at 12/19/18 0830    Pain Scale 0 - 10  5 by Renu Huizar R.N.    Taken at 12/12/18 1646    Pain Scale 0 - 10  8 by Renu Huizar R.N.    Non Verbal Scale  Calm by Renu Huizar R.N.    Comfort Goal Comfort with Movement;Perform Activity;Sleep Comfortably by Renu Huizar R.N.                  Problem: Respiratory:     Goal: Respiratory status will improve             Problem: Safety     Goal: Will remain free from injury           Goal: Will remain free from falls             Problem: Skin Integrity     Goal: Risk for impaired skin integrity will decrease             Problem: Urinary Elimination:     Goal: Ability to reestablish a normal urinary elimination pattern will improve             Problem: Venous Thromboembolism (VTW)/Deep Vein Thrombosis (DVT) Prevention:     Goal: Patient will participate in Venous Thrombosis (VTE)/Deep Vein Thrombosis (DVT)Prevention Measures                  Long Term Goals:   Other: Pt plans to d/c to a SNF    Section completed by:  Daria Ray R.N.

## 2018-12-24 NOTE — REHAB-OT IDT TEAM NOTE
Occupational Therapy   Activities of Daily Living  Eating Initial:  1 - Total Assistance  Eating Current:  5 - Standby Prompting/Supervision or Set-up   Eating Description:  Set-up of equipment or meal/tube feeding (requires setup to open packages )  Grooming Initial:  0 - Not tested, medical condition  Grooming Current:  5 - Standby Prompting/Supervision or Set-up   Grooming Description:  Increased time, Set-up of equipment (oral care, wash.dry hands)  Bathing Initial:  0 - Not tested, medical condition  Bathing Current:  2 - Max Assistance   Bathing Description:  Tub bench, Long handled bath tool, Hand held shower, Supervision for safety, Increased time, Verbal cueing, Set-up of equipment (assist w/ 7/10 tasks)  Upper Body Dressing Initial:  1 - Total Assistance  Upper Body Dressing Current:  5 - Standby Prompting/Supervision or Set-up   Upper Body Dressing Description:  Increased time, Supervision for safety, Set-up of equipment (setup to don pullover dress, SBA to stand to pull down over hips/LEs)  Lower Body Dressing Initial:  1 - Total Assistance  Lower Body Dressing Current:  2 - Max Assistance   Lower Body Dressing Description:  2 - Max Assistance  Toileting Initial:  1 - Total Assistance  Toileting Current:  3 - Moderate Assistance   Toileting Description:   (steadying assist for clothing management, assistance to complete hygiene with support of FWW)  Toilet Transfer Initial:  1 - Total Assistance  Toilet Transfer Current:  4 - Minimal Assistance   Toilet Transfer Description:  4 - Minimal Assistance  Tub / Shower Transfer Initial:  0 - Not tested,medical condition  Tub / Shower Transfer Current:  5 - Standby Prompting/Supervision or Set-up   Tub / Shower Transfer Description:  Supervision for safety, Verbal cueing, Grab bar  IADL:  Not addressed due to other basic needs required attention   Family training: None  DME/DC Recommendations:  Recommend SNF for longer term rehab and due to requiring extensive  assistance with ADLs, transfers and mobilty and not having much support at home (patient states S.O. Will be going to [place] to care for his mother)     Strengths:  Alert and oriented and Pleasant and cooperative  Barriers:  Decreased endurance, Generalized weakness, Home accessibility, Limited mobility, Pain poorly managed, Poor activity tolerance, Poor balance, Poor carryover of learning, Lack of motivation and Other: poor initiation for requesting help out of bed/getting dressed/eating meals; is frequently in bed, left hip pain, impaired UE use     # of short term goals set= 3    # of short term goals met= 3          Occupational Therapy Goals           Problem: Bathing     Dates: Start: 12/12/18       Goal: STG-Within one week, patient will bathe     Dates: Start: 12/24/18       Description: 1) Individualized Goal: Body with mod A using AE/AD/techniques as needed.  2) Interventions: OT E Stim Attended, OT Self Care/ADL, OT Community Reintegration, OT Manual Ther Technique, OT Neuro Re-Ed/Balance, OT Sensory Int Techniques, OT Therapeutic Activity, OT Aquatic Therapy, OT Evaluation and OT Therapeutic Exercise               Problem: Dressing     Dates: Start: 12/12/18       Goal: STG-Within one week, patient will dress LB     Dates: Start: 12/24/18       Description: 1) Individualized Goal: With mod A using AE/AD/techniques as needed.  2) Interventions: OT E Stim Attended, OT Self Care/ADL, OT Community Reintegration, OT Manual Ther Technique, OT Neuro Re-Ed/Balance, OT Sensory Int Techniques, OT Therapeutic Activity, OT Aquatic Therapy, OT Evaluation and OT Therapeutic Exercise                Problem: OT Long Term Goals     Dates: Start: 12/12/18       Goal: LTG-By discharge, patient will complete basic self care tasks     Dates: Start: 12/12/18       Description: 1) Individualized Goal:  With setup and supervision using AE/AD/techniques as needed.  2) Interventions:  OT E Stim Attended, OT Self Care/ADL, OT  Community Reintegration, OT Manual Ther Technique, OT Neuro Re-Ed/Balance, OT Sensory Int Techniques, OT Therapeutic Activity, OT Aquatic Therapy, OT Evaluation and OT Therapeutic Exercise           Goal: LTG-By discharge, patient will perform bathroom transfers     Dates: Start: 12/12/18       Description: 1) Individualized Goal:  With setup and supervision using AE/AD/techniques as needed.  2) Interventions:  OT E Stim Attended, OT Self Care/ADL, OT Community Reintegration, OT Manual Ther Technique, OT Neuro Re-Ed/Balance, OT Sensory Int Techniques, OT Therapeutic Activity, OT Aquatic Therapy, OT Evaluation and OT Therapeutic Exercise             Problem: Toileting     Dates: Start: 12/24/18       Goal: STG-Within one week, patient will complete toileting tasks     Dates: Start: 12/24/18       Description: 1) Individualized Goal: With CGA using AE/AD/techniques as needed.  2) Interventions: OT E Stim Attended, OT Self Care/ADL, OT Community Reintegration, OT Manual Ther Technique, OT Neuro Re-Ed/Balance, OT Sensory Int Techniques, OT Therapeutic Activity, OT Aquatic Therapy, OT Evaluation and OT Therapeutic Exercise                    Section completed by:  Gunnar Quigley MS,OTR/L

## 2018-12-24 NOTE — PROGRESS NOTES
Hospital Medicine Daily Progress Note        Chief Complaint  Edema    Interval Problem Update  12/17: Leg swelling better.  12/18: Waiting to go to the bathroom.   12/19: Blood pressure unremarkable.  Completing valacyclovir.  Patient is asking for small SCDs as well as concurrent dosing of oxycodone and MS Contin which she tolerated this morning.  Discussed with rehab Dr. Kimble.    12/20  No acute issues overnight patient restless otherwise comfortable watching TV.  Was asking for smaller SCDs otherwise denies having any chest pain shortness of breath or nausea vomiting. Her overall pain is well controlled on current regimen.  Continue Lasix for diuresis  Continue rehab per Dr. Harvey    12/21  Overall in good spirits, was discussing about her SCDs not fitting perfectly, reassurance was provided. Progressing well overall  Continue with exercises.   Resume lasix, note borderline BP, however patient asymptomatic.    12/22  In good spirits, no acute complaints, doing well overall. Says her pain is better managed.   Resume lasix despite noted incontinence  Continue with therapy.  Check bmp to make sure electrolytes stable     12/23  Patient was complaining of left hip discomfort, said her therapy is too intensive and her orthopaedic didn't want her to put that much pressure on it. Otherwise Patient denies fevers/chills, chest pain, shortness of breath or nausea/vommiting.   Continue therapy with above consideration.  Anemia has improved increase Hgb.    12/24  Patient feeling well, reports improvement in lower extremity edema. Left hip pain improving. Patient denies fevers/chills, chest pain, shortness of breath or nausea/vommiting.   Resume lasix.      Review of Systems  Review of Systems   Constitutional: Positive for malaise/fatigue. Negative for chills and fever.   HENT: Negative for congestion, hearing loss, sore throat and tinnitus.    Eyes: Negative for blurred vision, double vision, photophobia and pain.    Respiratory: Negative for cough, hemoptysis, sputum production, shortness of breath and stridor.    Cardiovascular: Positive for leg swelling (improving). Negative for chest pain, palpitations, orthopnea, claudication and PND.   Gastrointestinal: Negative for abdominal pain, blood in stool, constipation, heartburn, melena, nausea and vomiting.   Genitourinary: Negative for dysuria, frequency and urgency.        Incontinence    Musculoskeletal: Positive for joint pain, myalgias and neck pain. Negative for back pain.   Skin: Negative for rash.   Neurological: Positive for focal weakness and weakness. Negative for dizziness, tingling, tremors, sensory change, speech change and headaches.   Psychiatric/Behavioral: Negative for depression, memory loss and suicidal ideas. The patient is not nervous/anxious.         Physical Exam  Temp:  [36.8 °C (98.3 °F)-36.9 °C (98.5 °F)] 36.8 °C (98.3 °F)  Pulse:  [] 101  Resp:  [16-20] 16  BP: ()/(65-76) 113/76    Physical Exam   Constitutional: She is oriented to person, place, and time. She appears well-developed and well-nourished. No distress.   HENT:   Head: Normocephalic and atraumatic.   Mouth/Throat: No oropharyngeal exudate.   Eyes: Pupils are equal, round, and reactive to light. Conjunctivae are normal. Right eye exhibits no discharge. Left eye exhibits no discharge. No scleral icterus.   Neck: Neck supple. No JVD present. No thyromegaly present.   Cervical collar in place   Cardiovascular: Normal rate, regular rhythm, normal heart sounds and intact distal pulses.    No murmur heard.  Pulmonary/Chest: Effort normal and breath sounds normal. No stridor. No respiratory distress. She has no wheezes. She has no rales.   Abdominal: Soft. Bowel sounds are normal. She exhibits no distension. There is no tenderness. There is no rebound and no guarding.   Musculoskeletal: Normal range of motion. She exhibits edema (+1/2 pitting edema b/l L/E).   +2 pitting edema  bilateral lower extremities, gradual improvement,    Neurological: She is alert and oriented to person, place, and time. No cranial nerve deficit.   Skin: Skin is warm. No rash noted. She is not diaphoretic. No erythema. No pallor.   Psychiatric: She has a normal mood and affect. Her behavior is normal. Judgment and thought content normal.   Nursing note and vitals reviewed.      Fluids    Intake/Output Summary (Last 24 hours) at 12/24/18 0742  Last data filed at 12/23/18 1900   Gross per 24 hour   Intake              240 ml   Output                0 ml   Net              240 ml       Laboratory  Recent Labs      12/23/18   0630   WBC  7.8   RBC  3.99*   HEMOGLOBIN  10.1*   HEMATOCRIT  30.6*   MCV  76.7*   MCH  25.3*   MCHC  33.0*   RDW  50.5*   PLATELETCT  421   MPV  9.8     Recent Labs      12/23/18   0630   SODIUM  144   POTASSIUM  3.9   CHLORIDE  107   CO2  27   GLUCOSE  99   BUN  7*   CREATININE  0.57   CALCIUM  9.8                   Assessment/Plan  * Cervical myelopathy (HCC)   Assessment & Plan    S/P cervical laminectomy and fusion  Maintain Cervical collar     Anemia   Assessment & Plan    Continue with PO iron supplementation  Upon discharge recommend outpatient GI appointment for diagnostic colonoscopy       Rash   Assessment & Plan    Completed 7 days of Valtrex for possible herpes zoster rash       Vitamin D deficiency   Assessment & Plan    Continue with vitamin D supplementation     Edema   Assessment & Plan    Her echocardiogram showed preserved LVEF of 60%  Lower extremity duplex was negative for DVTs  Resume Lasix at current dose, improving LE edema overall   Difficult to monitor urine output since patient has incontinence   Blood pressure overall well controlled        Left hip pain- (present on admission)   Assessment & Plan    Continue with gentle therapy.  Pain control      Essential hypertension- (present on admission)   Assessment & Plan    Controlled  Only on lasix, continue at current dose         History of gastric bypass- (present on admission)   Assessment & Plan    Resume as needed Tums  Continue with omeprazole for GI prophylaxis           Patient's CODE STATUS is full code    Total time:  30 minutes.  I spent greater than 50% of the time for patient care, counseling, and coordination on this date, including unit/floor time, and face-to-face time with the patient as per interval events and assessment and plan above

## 2018-12-24 NOTE — PROGRESS NOTES
"Rehab Progress Note     Encounter date: 12/24/2018  Today I met with the patient face to face in her room    Chief Complaint:  Cervical myelopathy (HCC) , complaints regarding hip arthritis pain and bedside commode    Interval Events (subjective)  Karine Ovalle is frustrated that she is not allowed to access the bedside commode.  We had a long and liane discussion about the need to continue working to improve her functional independence.  We discussed that hip surgery will likely be delayed due to her cervical spine surgery, and she will need to be able to transition home from the skilled nursing facility.  Because of this, we are continuing to encourage self-motivation for getting out of bed, staying out of bed, and getting to the bathroom.  I have also continued to encourage her to be proactive with void attempts every 3 hours to try to minimize bladder incontinence.    Objective:  VITAL SIGNS: /76   Pulse (!) 101   Temp 36.8 °C (98.3 °F) (Oral)   Resp 16   Ht 1.6 m (5' 3\")   Wt 83.2 kg (183 lb 6.4 oz)   LMP  (LMP Unknown)   SpO2 97%   Breastfeeding? No   BMI 32.49 kg/m²     Recent Results (from the past 72 hour(s))   CBC WITHOUT DIFFERENTIAL    Collection Time: 12/23/18  6:30 AM   Result Value Ref Range    WBC 7.8 4.8 - 10.8 K/uL    RBC 3.99 (L) 4.20 - 5.40 M/uL    Hemoglobin 10.1 (L) 12.0 - 16.0 g/dL    Hematocrit 30.6 (L) 37.0 - 47.0 %    MCV 76.7 (L) 81.4 - 97.8 fL    MCH 25.3 (L) 27.0 - 33.0 pg    MCHC 33.0 (L) 33.6 - 35.0 g/dL    RDW 50.5 (H) 35.9 - 50.0 fL    Platelet Count 421 164 - 446 K/uL    MPV 9.8 9.0 - 12.9 fL   COMP METABOLIC PANEL    Collection Time: 12/23/18  6:30 AM   Result Value Ref Range    Sodium 144 135 - 145 mmol/L    Potassium 3.9 3.6 - 5.5 mmol/L    Chloride 107 96 - 112 mmol/L    Co2 27 20 - 33 mmol/L    Anion Gap 10.0 0.0 - 11.9    Glucose 99 65 - 99 mg/dL    Bun 7 (L) 8 - 22 mg/dL    Creatinine 0.57 0.50 - 1.40 mg/dL    Calcium 9.8 8.5 - 10.5 mg/dL    AST(SGOT) 14 12 - 45 " U/L    ALT(SGPT) 10 2 - 50 U/L    Alkaline Phosphatase 106 (H) 30 - 99 U/L    Total Bilirubin 0.3 0.1 - 1.5 mg/dL    Albumin 3.3 3.2 - 4.9 g/dL    Total Protein 6.5 6.0 - 8.2 g/dL    Globulin 3.2 1.9 - 3.5 g/dL    A-G Ratio 1.0 g/dL   ESTIMATED GFR    Collection Time: 12/23/18  6:30 AM   Result Value Ref Range    GFR If African American >60 >60 mL/min/1.73 m 2    GFR If Non African American >60 >60 mL/min/1.73 m 2       Current Facility-Administered Medications   Medication Frequency   • methocarbamol (ROBAXIN) tablet 1,000 mg 4X/DAY   • ferrous sulfate tablet 325 mg QDAY with Breakfast   • ascorbic acid tablet 500 mg QDAY with Breakfast   • morphine ER (MS CONTIN) tablet 30 mg BID   • neomycin sulf/polymyx B sulf/HC soln (CORTISPORIN HC SOL) 3.5-42573-2 otic solution 4 Drop PRN   • bisacodyl (DULCOLAX) suppository 10 mg DAILY   • polyethylene glycol/lytes (MIRALAX) PACKET 1 Packet QDAY PRN   • magnesium hydroxide (MILK OF MAGNESIA) suspension 30 mL QDAY PRN   • Respiratory Care per Protocol Continuous RT   • Pharmacy Consult Request ...Pain Management Review 1 Each PRN   • oxyCODONE immediate-release (ROXICODONE) tablet 5 mg Q3HRS PRN   • oxyCODONE immediate release (ROXICODONE) tablet 10 mg Q3HRS PRN   • hydrALAZINE (APRESOLINE) tablet 25 mg Q8HRS PRN   • artificial tears 1.4 % ophthalmic solution 1 Drop PRN   • benzocaine-menthol (CEPACOL) lozenge 1 Lozenge Q2HRS PRN   • mag hydrox-al hydrox-simeth (MAALOX PLUS ES or MYLANTA DS) suspension 20 mL Q2HRS PRN   • ondansetron (ZOFRAN ODT) dispertab 4 mg 4X/DAY PRN    Or   • ondansetron (ZOFRAN) syringe/vial injection 4 mg 4X/DAY PRN   • traZODone (DESYREL) tablet 50 mg QHS PRN   • sodium chloride (OCEAN) 0.65 % nasal spray 2 Spray PRN   • enoxaparin (LOVENOX) inj 40 mg DAILY   • ALPRAZolam (XANAX) tablet 0.5 mg TID PRN   • calcium carbonate (TUMS) chewable tab 500 mg BID   • docusate sodium (COLACE) capsule 100 mg BID   • promethazine (PHENERGAN) tablet 12.5-25 mg  Q4HRS PRN   • vitamin D (cholecalciferol) tablet 5,000 Units DAILY   • cyanocobalamin (VITAMIN B12) tablet 1,000 mcg DAILY   • furosemide (LASIX) tablet 40 mg DAILY   • gabapentin (NEURONTIN) capsule 300 mg TID   • potassium chloride SA (Kdur) tablet 20 mEq DAILY   • senna-docusate (PERICOLACE or SENOKOT S) 8.6-50 MG per tablet 1 Tab BID   • omeprazole (PRILOSEC) capsule 20 mg DAILY       Exam Date: 12/24/2018    General:  Awake, alert, oriented, no acute distress  HEENT:  Wearing Aspen cervical collar  Cardiac: regular rate and rhythm  Lungs: clear to auscultation bilaterally.   Abdomen: soft; non tender, non distended, bowel sounds present and normoactive  Extremities: No edema in the bilateral lower limbs  Neuro:   Some variability again noted with right upper limb manual muscle testing compared to observations during interview.  Right elbow flexor appears to be 3+/5 during conversational use, but with formal manual muscle testing, this was diminished to 2+/5.  Right finger flexion remains 4/5.          Orders Placed This Encounter   Procedures   • Diet Order Regular (all meats chopped, sandwiches cut in 1/4)     Standing Status:   Standing     Number of Occurrences:   1     Order Specific Question:   Diet:     Answer:   Regular [1]     Comments:   all meats chopped, sandwiches cut in 1/4     Order Specific Question:   Consistency/Fluid modifications:     Answer:   Thin Liquids [3]       Assessment:  Active Hospital Problems    Diagnosis   • *Cervical myelopathy (HCC)   • Edema   • Vitamin D deficiency   • Rash   • Anemia   • Left hip pain   • Obesity (BMI 30-39.9)   • Primary osteoarthritis of left hip- dr nowak; left THR tbd feb, 2019   • Essential hypertension   • Vitamin B 12 deficiency   • History of gastric bypass       Medical Decision Making and Plan:  Karine Ovalle is a 53-year-old female admitted on December 11 for cervical myelopathy    Cervical Myelopathy/C4 AIS C nontraumatic SCI  Status post 2 stage  cervical decompression by Dr. Mendoza with ACDF C3-6 on 12/5/18 and C3-C5 laminectomies with C3-T1 posterior fusion on 12/7/18   Continue comprehensive rehabilitation  Continue cervical collar at all times with brief removal for shower and meals    Continue comprehensive therapy well awaiting skilled nursing facility acceptance    Continuing to encourage the patient to be proactive and self initiate    Chronic pain  Postoperative pain  Tylenol 1000 mg every 6 hours  Gabapentin 300 mg 3 times a day  Robaxin 1000 mg 4 times a day  MS Contin 30 mg twice a day  Oxycodone 5-10 mg every 4 hours as needed--due to decreased usage    Patient did not tolerate oxycodone weaning  She will need to follow-up with her outpatient pain provider for discharge prescriptions    She has utilized 30mg of oxycodone in the last 24 hours     Dysphagia --resolved  Speech and language pathology advanced diet to regular with thins     Neurogenic bladder  History of incontinence and numbness prior to surgery  Ongoing incontinence    Continuing to encourage the patient to be proactive and initiate timed voids    Neurogenic Bowel  Opioid-induced constipation  Dulcolax suppository daily  Colace 100 mg twice a day  Milk of magnesia as needed  MiraLAX as needed  Milagro-Colace 1 tab twice daily    Lower limb edema  Hypokalemia  Appreciate hospitalist assistance  Lasix 40 mg daily  Potassium chloride 20 mEq daily    Potassium is stable at 3.9 on December 23    Anemia   Continue B12 and iron  Hemoglobin stable at 10.1 on December 23  Continue to monitor    Internal medicine has recommended outpatient colonoscopy to follow-up for iron deficiency anemia     Left buttock lesion  This was present on admission  Hospitalist believes likely herpes simplex versus HPV  Valacyclovir completed December 19     Vitamin B12 deficiency  Vitamin D deficiency  Continue B12 supplementation  Vitamin D was 14 on admission, so continue supplementation with 5000 units  daily     DVT prophylaxis  40 mg Lovenox daily     Estimated discharge: She will need skilled nursing due to slow progress, lack of family support      IDT Team Conference 12/24/2018  I individually evaluated the patient today.  In addition to my daily follow up visit, I was present for and led the interdisciplinary team conference on 12/24/2018 and agree with the interdisciplinary conference documentation and plan of care.     RN: She is refusing the bowel program and bladder cuing.       PT:  She requires frequent cuing and motivation.  She is making limited functional progress.  Her hip pain is limiting.       OT:  She is making some progress with her right upper limb function.  She has poor initiation and requires cuing in the morning.    Total time:  35 minutes.  I spent greater than 50% of the time for patient care, counseling, and coordination on this date, including unit/floor time, and face-to-face time with the patient as per interval events and assessment and plan above. Topics discussed included liane discussion about motivation, self initiation, and proactive management of bowel and bladder.  We discussed pain, hip surgery, and discharge plans      Brice Harvey M.D.  12/24/2018

## 2018-12-24 NOTE — REHAB-PT IDT TEAM NOTE
Physical Therapy   Mobility  Bed mobility:   3  Bed /Chair/Wheelchair Transfer Initial:  1 - Total Assistance  Bed /Chair/Wheelchair Transfer Current:  3 - Moderate Assistance   Bed/Chair/Wheelchair Transfer Description:  Assist with two limbs, Set-up of equipment, Initial preparation for task, Increased time (Pt. needed mod. assist. with supine in bed to/from sitting in wheelchair with use of FWW.)  Walk Initial:  1 - Total Assistance  Walk Current:  1 - Total Assistance   Walk Description:  Two hand rails, Supervision for safety, Extra time (Pt. amb. in // bars x 24 ft. with SBA with step to gait pattern.)  Wheelchair Initial:  0 - Not tested,medical condition  Wheelchair Current:  2 - Max Assistance   Wheelchair Description:   (85ft x1, L UE, R LE, significantly increased time for performance)  Stairs Initial:  0 - Not tested,medical condition  Stairs Current: 0 - Not tested,medical condition   Stairs Description:    Patient/Family Training/Education:    DME/DC Recommendations:  Sitting HEP, transfer training, bed mobility, pt refused conservative pain management education, pressure relief/skin care, AMB, standing tolerance, spinal precautions  Strengths:  Able to follow instructions and Alert and oriented  Barriers:   Decreased endurance, Home accessibility, Limited mobility, Pain poorly managed, Poor activity tolerance, Poor balance, Poor insight/denial of deficits and Lack of motivation, self limiting, perseverative on L hip pain  # of short term goals set= 5  # of short term goals met=2       Physical Therapy Problems           Problem: Mobility     Dates: Start: 12/12/18       Goal: STG-Within one week, patient will propel wheelchair community     Dates: Start: 12/12/18       Description: 1) Individualized goal: 50ft x 1, SPV  2) Interventions:  PT Group Therapy, PT E Stim Attended, PT Gait Training, PT Therapeutic Exercises, PT Neuro Re-Ed/Balance, PT Therapeutic Activity, PT Manual Therapy and PT  Evaluation.     Note:     Goal Note filed on 12/17/18 0852 by Mindy Stein, PT    Goal: STG-Within one week, patient will propel wheelchair community  Outcome: NOT MET  Pt limited by participation, increased pain                Goal: STG-Within one week, patient will ambulate household distance     Dates: Start: 12/12/18       Description: 1) Individualized goal:  25ft x 1, FWW, CGA  2) Interventions:  PT Group Therapy, PT E Stim Attended, PT Gait Training, PT Therapeutic Exercises, PT Neuro Re-Ed/Balance, PT Therapeutic Activity, PT Manual Therapy and PT Evaluation.     Note:     Goal Note filed on 12/17/18 0852 by Mindy Stein, PT    Goal: STG-Within one week, patient will ambulate household distance  Outcome: NOT MET  Pt limited by pain in hip, fear avoidance                Goal: STG-Within one week, patient will ascend and descend four to six stairs     Dates: Start: 12/12/18       Description: 1) Individualized goal:  3 x 1 stairs with unilateral HR, mod A  2) Interventions:  PT Group Therapy, PT E Stim Attended, PT Gait Training, PT Therapeutic Exercises, PT Neuro Re-Ed/Balance, PT Therapeutic Activity, PT Manual Therapy and PT Evaluation.     Note:     Goal Note filed on 12/17/18 0852 by Mindy Stein, PT    Goal: STG-Within one week, patient will ascend and descend four to six   stairs  Outcome: NOT MET  Pt refused to attempt this week                  Problem: Mobility Transfers     Dates: Start: 12/12/18       Goal: STG-Within one week, patient will perform bed mobility     Dates: Start: 12/12/18       Description: 1) Individualized goal:  [place] x 1 with AD as needed  2) Interventions:  PT Group Therapy, PT E Stim Attended, PT Gait Training, PT Therapeutic Exercises, PT Neuro Re-Ed/Balance, PT Therapeutic Activity, PT Manual Therapy and PT Evaluation.       Note:     Goal Note filed on 12/17/18 0852 by Mindy Stein PT    Goal: STG-Within one week, patient will perform bed mobility  Outcome: NOT  MET  Pt limited by dependency, limited self efficacy, hip pain                Goal: STG-Within one week, patient will transfer bed to chair     Dates: Start: 12/12/18       Description: 1) Individualized goal:  Pt will transfer bed to chair with FWW, SBA  2) Interventions:  PT Group Therapy, PT E Stim Attended, PT Gait Training, PT Therapeutic Exercises, PT Neuro Re-Ed/Balance, PT Therapeutic Activity, PT Manual Therapy and PT Evaluation.       Note:     Goal Note filed on 12/17/18 0852 by Mindy Stein, PT    Goal: STG-Within one week, patient will transfer bed to chair  Outcome: NOT MET  Pt limited by pain, fear avoidance, participation                   Problem: PT-Long Term Goals     Dates: Start: 12/12/18       Goal: LTG-By discharge, patient will ambulate     Dates: Start: 12/12/18       Description: 1) Individualized goal:  150ft x 1, FWW, SPV  2) Interventions:  PT Group Therapy, PT E Stim Attended, PT Gait Training, PT Therapeutic Exercises, PT Neuro Re-Ed/Balance, PT Therapeutic Activity, PT Manual Therapy and PT Evaluation.       Note:     Goal Note filed on 12/21/18 1354 by Mindy Stein, PT    Goal: LTG-By discharge, patient will ambulate  Outcome: NOT MET  Pt refused d/t complaints of R hip pain                Goal: LTG-By discharge, patient will perform home exercise program     Dates: Start: 12/12/18       Description: 1) Individualized goal:Pt will perform HEP for B LE strengthening, mod I  2) Interventions:  PT Group Therapy, PT E Stim Attended, PT Gait Training, PT Therapeutic Exercises, PT Neuro Re-Ed/Balance, PT Therapeutic Activity, PT Manual Therapy and PT Evaluation.       Note:     Goal Note filed on 12/21/18 1354 by Mindy Stein, PT    Goal: LTG-By discharge, patient will perform home exercise program  Outcome: NOT MET  Pt limited by motivation, adherence to program                 Goal: LTG-By discharge, patient will ambulate up/down 4-6 stairs     Dates: Start: 12/12/18        "Description: 1) Individualized goal:  4x1 6\" steps, unilateral HR, SBA  2) Interventions:  PT Group Therapy, PT E Stim Attended, PT Gait Training, PT Therapeutic Exercises, PT Neuro Re-Ed/Balance, PT Therapeutic Activity, PT Manual Therapy and PT Evaluation.       Note:     Goal Note filed on 12/21/18 7700 by Mindy Stein, PT    Goal: LTG-By discharge, patient will ambulate up/down 4-6 stairs  Outcome: NOT MET  Pt limited by R hip pain                Goal: LTG-By discharge, patient will transfer in/out of a car     Dates: Start: 12/12/18       Description: 1) Individualized goal: Pt will transfer in/out of a car with FWW and SBA  2) Interventions:  PT Group Therapy, PT E Stim Attended, PT Gait Training, PT Therapeutic Exercises, PT Neuro Re-Ed/Balance, PT Therapeutic Activity, PT Manual Therapy and PT Evaluation.       Note:     Goal Note filed on 12/21/18 4257 by Mindy Stein, PT    Goal: LTG-By discharge, patient will transfer in/out of a car  Outcome: NOT MET  Pt limited by R hip pain                        Section completed by:  Mindy Stein, PT, DPT     "

## 2018-12-24 NOTE — REHAB-COLLABORATIVE ONGOING IDT TEAM NOTE
Weekly Interdisciplinary Team Conference Note    Weekly Interdisciplinary Team Conference # 2  Date:  12/24/2018    Clinicians present and reporting at team conference include the following:   MD: Brice Harvey MD   RN:  Kelsie Tucker RN   PT:   Mindy Stein, PT, DPT  OT:  Emy Saleh OT, OTR/L   ST:  Not Applicable  CM:  Briana Herzog RN, CCM  REC:  Luis Luque, CTRS  RT:  None  RPh:  Susana Greer, [facility]  Other:   None  All reporting clinicians have a working knowledge of this patient's plan of care.    Targeted DC Date:  TBD     Medical    Patient Active Problem List    Diagnosis Date Noted   • Mild intermittent asthma without complication 04/25/2012     Priority: High   • Edema 12/13/2018   • Vitamin D deficiency 12/13/2018   • Rash 12/13/2018   • Anemia 12/13/2018   • Cervical myelopathy (HCC) 12/11/2018   • Left hip pain 09/18/2018   • Encounter for work capability assessment- FMLA PAPERWORK 08/29/2018   • DDD (degenerative disc disease), cervical- MOD-SEVERE SPINE [facility]- dr brennan; needs laminectomy 12/5/2018 dr brennan 08/09/2018   • Primary osteoarthritis of left hip- dr nowak; left THR tbd feb, 2019 06/07/2018   • Obesity (BMI 30-39.9) 06/07/2018   • DDD (degenerative disc disease), lumbar 04/25/2018   • Uncomplicated opioid dependence (CMS-HCC)- kylah adkins 01/31/2018   • Essential hypertension 01/31/2018   • Venous insufficiency 10/03/2017   • Colon cancer screening- needs 08/09/2017   • Chronic bilateral low back pain with bilateral sciatica- pain mgt 07/26/2017   • Vitamin B 12 deficiency 06/02/2016   • Fibroids 08/13/2013   • History of gastric bypass 04/25/2012     Results     ** No results found for the last 24 hours. **           Nursing  Diet/Nutrition: regular thin liquids, all meats chopped cut sandwiches in 1/4.    Medication Administration:  Crushed most, float those unable to crush, administere in pudding with sprite chaser.  % consumed at meals in last 24 hours:  Consumed % of meals per  documentation.  Meal/Snack Consumption for the past 24 hrs:   Oral Nutrition   12/23/18 1900 Dinner;Between % Consumed       Snack schedule:  0600, 2100, 0000.  Supplement:  Other: pudding  Appetite:  Good  Fluid Intake/Output in past 24 hours: In: 240 [P.O.:240]  Out: -   Admit Weight:  Weight: 84.4 kg (186 lb)  Weight Last 7 Days   [83.2 kg (183 lb 6.4 oz)] 83.2 kg (183 lb 6.4 oz) (12/23 1615)    Pain Issues:    Location:  Back;Neck;Hip (12/23 1315)  Right;Left (12/23 1315)         Severity:  Severe   Scheduled pain medications:  gabapentin (NEURONTIN) , oxycodone CR (OXYCONTIN)  and oxycodone (ROXICODONE)      PRN pain medications used in last 24 hours:  oxycodone immediate release (ROXICODONE)    Non Pharmacologic Interventions:  distraction, emotional support, environmental changes and repositioned  Effectiveness of pain management plan:  fair=improved comfort with interventions but does not always meet goal    Bowel:    Bowel Assist Initial Score:  1 - Total Assistance  Bowel Assist Current Score:  4 - Minimal Assistance  Bowl Accidents in last 7 days: 0    Last bowel movement: 12/22/18  Stool Description: Large, Loose, Brown     Usual bowel pattern:  every other day  Scheduled bowel medications:  senna-docusate (PERICOLACE or SENOKOT S)   PRN bowel medications used in last 24 hours:  None  Nursing Interventions:  Scheduled medication, Supervision, Verbal cueing, Positioning on commode/toilet  Effectiveness of bowel program:   good=regular, predictable, controlled emptying of bowel  Bladder:    Bladder Assist Initial Score:  1 - Total Assistance  Bladder Assist Current Score:  2 - Max Assistance  Bladder Accidents in last 7 days:  2  PVR range for past 24-48 hours: -- ()  Intermittent Catheterization:  N/A  Medications affecting bladder:  None    Time void schedule/voiding pattern:  Voiding every 1-2 hours after lasix, approx q 3 hours after lasix wears off.  Interventions:  Increased time, Supervision,  Verbal cueing, Brief  Effectiveness of bladder training:  Poor=Continues to have bladder accidents    Su:    Type:     Patient Lines/Drains/Airways Status    Active Catheter     None              Date placed:          Justification:    Diabetes:  Blood Sugar Frequency:  None    Range of BS for last 48 hours:       Scheduled diabetic medications:  None  Sliding scale usage in past 24 hours:  No  Total Short acting insulin in the past 24 hours:  None  Total Long acting insulin in the past 24 hours:  None    Wound:         Patient Lines/Drains/Airways Status    Active Current Wounds     None                   Interventions:   Effectiveness of intervention:  wound is improving     Wound Vac Location:  Not applicable  Dressing last changed:  12/23/2018  Pump Mode Pressure Setting       Description of drainage:  none    Sleep/wake cycle:   Average hours slept:  Sleeps 4-6 hours without waking  Sleep medication usage:  Other PRN trazadone available, has not taken.    Patient/Family Training/Education:  Bladder Management/Training, Bowel Management/Training, Fall Prevention, General Self Care, Medication Management, Pain Management, Safe Transfers and Safety  Discharge Supply Recommendations:Other: extra pads for aspen collar  Strengths: Alert and oriented and Able to follow instructions   Barriers:   Bladder incontinence, Generalized weakness and Limited mobility            Nursing Problems           Problem: Bowel/Gastric:     Goal: Normal bowel function is maintained or improved     Flowsheet:     Taken at 12/23/18 2152    Last BM 12/22/18 by Nidia Christine, R.N.    Taken at 12/11/18 1210    Last BM 12/08/18 by Kelsie Tucker RCHRISTOPHER. Comment:  per report                Goal: Will not experience complications related to bowel motility             Problem: Communication     Goal: The ability to communicate needs accurately and effectively will improve             Problem: Discharge Barriers/Planning     Goal:  Patient's continuum of care needs will be met             Problem: Infection     Goal: Will remain free from infection             Problem: Knowledge Deficit     Goal: Knowledge of disease process/condition, treatment plan, diagnostic tests, and medications will improve           Goal: Knowledge of the prescribed therapeutic regimen will improve             Problem: Mobility     Goal: Risk for activity intolerance will decrease             Problem: Pain Management     Goal: Pain level will decrease to patient's comfort goal     Flowsheet:     Taken at 12/19/18 0830    Pain Scale 0 - 10  5 by Renu Huizar R.N.    Taken at 12/12/18 1646    Pain Scale 0 - 10  8 by Renu Huizar R.N.    Non Verbal Scale  Calm by Renu Huizar R.N.    Comfort Goal Comfort with Movement;Perform Activity;Sleep Comfortably by Renu Huizar R.N.                  Problem: Respiratory:     Goal: Respiratory status will improve             Problem: Safety     Goal: Will remain free from injury           Goal: Will remain free from falls             Problem: Skin Integrity     Goal: Risk for impaired skin integrity will decrease             Problem: Urinary Elimination:     Goal: Ability to reestablish a normal urinary elimination pattern will improve             Problem: Venous Thromboembolism (VTW)/Deep Vein Thrombosis (DVT) Prevention:     Goal: Patient will participate in Venous Thrombosis (VTE)/Deep Vein Thrombosis (DVT)Prevention Measures                  Long Term Goals:   Other: Pt plans to d/c to a SNF    Section completed by:  Daria Ray R.N.              Mobility  Bed mobility:   3  Bed /Chair/Wheelchair Transfer Initial:  1 - Total Assistance  Bed /Chair/Wheelchair Transfer Current:  3 - Moderate Assistance   Bed/Chair/Wheelchair Transfer Description:  Assist with two limbs, Set-up of equipment, Initial preparation for task, Increased time (Pt. needed mod. assist. with supine in bed to/from sitting  in wheelchair with use of FWW.)  Walk Initial:  1 - Total Assistance  Walk Current:  1 - Total Assistance   Walk Description:  Two hand rails, Supervision for safety, Extra time (Pt. amb. in // bars x 24 ft. with SBA with step to gait pattern.)  Wheelchair Initial:  0 - Not tested,medical condition  Wheelchair Current:  2 - Max Assistance   Wheelchair Description:   (85ft x1, L UE, R LE, significantly increased time for performance)  Stairs Initial:  0 - Not tested,medical condition  Stairs Current: 0 - Not tested,medical condition   Stairs Description:    Patient/Family Training/Education:    DME/DC Recommendations:  Sitting HEP, transfer training, bed mobility, pt refused conservative pain management education, pressure relief/skin care, AMB, standing tolerance, spinal precautions  Strengths:  Able to follow instructions and Alert and oriented  Barriers:   Decreased endurance, Home accessibility, Limited mobility, Pain poorly managed, Poor activity tolerance, Poor balance, Poor insight/denial of deficits and Lack of motivation, self limiting, perseverative on L hip pain  # of short term goals set= 5  # of short term goals met=2       Physical Therapy Problems           Problem: Mobility     Dates: Start: 12/12/18       Goal: STG-Within one week, patient will propel wheelchair community     Dates: Start: 12/12/18       Description: 1) Individualized goal: 50ft x 1, SPV  2) Interventions:  PT Group Therapy, PT E Stim Attended, PT Gait Training, PT Therapeutic Exercises, PT Neuro Re-Ed/Balance, PT Therapeutic Activity, PT Manual Therapy and PT Evaluation.     Note:     Goal Note filed on 12/17/18 0885 by Mindy Stein, PT    Goal: STG-Within one week, patient will propel wheelchair community  Outcome: NOT MET  Pt limited by participation, increased pain                Goal: STG-Within one week, patient will ambulate household distance     Dates: Start: 12/12/18       Description: 1) Individualized goal:  25ft x 1,  FWW, CGA  2) Interventions:  PT Group Therapy, PT E Stim Attended, PT Gait Training, PT Therapeutic Exercises, PT Neuro Re-Ed/Balance, PT Therapeutic Activity, PT Manual Therapy and PT Evaluation.     Note:     Goal Note filed on 12/17/18 0852 by Mindy Stein, PT    Goal: STG-Within one week, patient will ambulate household distance  Outcome: NOT MET  Pt limited by pain in hip, fear avoidance                Goal: STG-Within one week, patient will ascend and descend four to six stairs     Dates: Start: 12/12/18       Description: 1) Individualized goal:  3 x 1 stairs with unilateral HR, mod A  2) Interventions:  PT Group Therapy, PT E Stim Attended, PT Gait Training, PT Therapeutic Exercises, PT Neuro Re-Ed/Balance, PT Therapeutic Activity, PT Manual Therapy and PT Evaluation.     Note:     Goal Note filed on 12/17/18 0852 by Mindy Stein, PT    Goal: STG-Within one week, patient will ascend and descend four to six   stairs  Outcome: NOT MET  Pt refused to attempt this week                  Problem: Mobility Transfers     Dates: Start: 12/12/18       Goal: STG-Within one week, patient will perform bed mobility     Dates: Start: 12/12/18       Description: 1) Individualized goal:  [place] x 1 with AD as needed  2) Interventions:  PT Group Therapy, PT E Stim Attended, PT Gait Training, PT Therapeutic Exercises, PT Neuro Re-Ed/Balance, PT Therapeutic Activity, PT Manual Therapy and PT Evaluation.       Note:     Goal Note filed on 12/17/18 0852 by Mindy Stein, PT    Goal: STG-Within one week, patient will perform bed mobility  Outcome: NOT MET  Pt limited by dependency, limited self efficacy, hip pain                Goal: STG-Within one week, patient will transfer bed to chair     Dates: Start: 12/12/18       Description: 1) Individualized goal:  Pt will transfer bed to chair with FWW, SBA  2) Interventions:  PT Group Therapy, PT E Stim Attended, PT Gait Training, PT Therapeutic Exercises, PT Neuro Re-Ed/Balance,  "PT Therapeutic Activity, PT Manual Therapy and PT Evaluation.       Note:     Goal Note filed on 12/17/18 0852 by Mindy Stein, PT    Goal: STG-Within one week, patient will transfer bed to chair  Outcome: NOT MET  Pt limited by pain, fear avoidance, participation                   Problem: PT-Long Term Goals     Dates: Start: 12/12/18       Goal: LTG-By discharge, patient will ambulate     Dates: Start: 12/12/18       Description: 1) Individualized goal:  150ft x 1, FWW, SPV  2) Interventions:  PT Group Therapy, PT E Stim Attended, PT Gait Training, PT Therapeutic Exercises, PT Neuro Re-Ed/Balance, PT Therapeutic Activity, PT Manual Therapy and PT Evaluation.       Note:     Goal Note filed on 12/21/18 1359 by Mindy Stein, PT    Goal: LTG-By discharge, patient will ambulate  Outcome: NOT MET  Pt refused d/t complaints of R hip pain                Goal: LTG-By discharge, patient will perform home exercise program     Dates: Start: 12/12/18       Description: 1) Individualized goal:Pt will perform HEP for B LE strengthening, mod I  2) Interventions:  PT Group Therapy, PT E Stim Attended, PT Gait Training, PT Therapeutic Exercises, PT Neuro Re-Ed/Balance, PT Therapeutic Activity, PT Manual Therapy and PT Evaluation.       Note:     Goal Note filed on 12/21/18 1359 by Mindy Stein, PT    Goal: LTG-By discharge, patient will perform home exercise program  Outcome: NOT MET  Pt limited by motivation, adherence to program                 Goal: LTG-By discharge, patient will ambulate up/down 4-6 stairs     Dates: Start: 12/12/18       Description: 1) Individualized goal:  4x1 6\" steps, unilateral HR, SBA  2) Interventions:  PT Group Therapy, PT E Stim Attended, PT Gait Training, PT Therapeutic Exercises, PT Neuro Re-Ed/Balance, PT Therapeutic Activity, PT Manual Therapy and PT Evaluation.       Note:     Goal Note filed on 12/21/18 1355 by Mindy Stein, PT    Goal: LTG-By discharge, patient will ambulate " up/down 4-6 stairs  Outcome: NOT MET  Pt limited by R hip pain                Goal: LTG-By discharge, patient will transfer in/out of a car     Dates: Start: 12/12/18       Description: 1) Individualized goal: Pt will transfer in/out of a car with FWW and SBA  2) Interventions:  PT Group Therapy, PT E Stim Attended, PT Gait Training, PT Therapeutic Exercises, PT Neuro Re-Ed/Balance, PT Therapeutic Activity, PT Manual Therapy and PT Evaluation.       Note:     Goal Note filed on 12/21/18 1354 by Mindy Stein, PT    Goal: LTG-By discharge, patient will transfer in/out of a car  Outcome: NOT MET  Pt limited by R hip pain                        Section completed by:  Mindy Stein, PT, DPT       Activities of Daily Living  Eating Initial:  1 - Total Assistance  Eating Current:  5 - Standby Prompting/Supervision or Set-up   Eating Description:  Set-up of equipment or meal/tube feeding (requires setup to open packages )  Grooming Initial:  0 - Not tested, medical condition  Grooming Current:  5 - Standby Prompting/Supervision or Set-up   Grooming Description:  Increased time, Set-up of equipment (oral care, wash.dry hands)  Bathing Initial:  0 - Not tested, medical condition  Bathing Current:  2 - Max Assistance   Bathing Description:  Tub bench, Long handled bath tool, Hand held shower, Supervision for safety, Increased time, Verbal cueing, Set-up of equipment (assist w/ 7/10 tasks)  Upper Body Dressing Initial:  1 - Total Assistance  Upper Body Dressing Current:  5 - Standby Prompting/Supervision or Set-up   Upper Body Dressing Description:  Increased time, Supervision for safety, Set-up of equipment (setup to don pullover dress, SBA to stand to pull down over hips/LEs)  Lower Body Dressing Initial:  1 - Total Assistance  Lower Body Dressing Current:  2 - Max Assistance   Lower Body Dressing Description:  2 - Max Assistance  Toileting Initial:  1 - Total Assistance  Toileting Current:  3 - Moderate  Assistance   Toileting Description:   (steadying assist for clothing management, assistance to complete hygiene with support of FWW)  Toilet Transfer Initial:  1 - Total Assistance  Toilet Transfer Current:  4 - Minimal Assistance   Toilet Transfer Description:  4 - Minimal Assistance  Tub / Shower Transfer Initial:  0 - Not tested,medical condition  Tub / Shower Transfer Current:  5 - Standby Prompting/Supervision or Set-up   Tub / Shower Transfer Description:  Supervision for safety, Verbal cueing, Grab bar  IADL:  Not addressed due to other basic needs required attention   Family training: None  DME/DC Recommendations:  Recommend SNF for longer term rehab and due to requiring extensive assistance with ADLs, transfers and mobilty and not having much support at home (patient states S.O. Will be going to [place] to care for his mother)     Strengths:  Alert and oriented and Pleasant and cooperative  Barriers:  Decreased endurance, Generalized weakness, Home accessibility, Limited mobility, Pain poorly managed, Poor activity tolerance, Poor balance, Poor carryover of learning, Lack of motivation and Other: poor initiation for requesting help out of bed/getting dressed/eating meals; is frequently in bed, left hip pain, impaired UE use     # of short term goals set= 3    # of short term goals met= 3          Occupational Therapy Goals           Problem: Bathing     Dates: Start: 12/12/18       Goal: STG-Within one week, patient will bathe     Dates: Start: 12/24/18       Description: 1) Individualized Goal: Body with mod A using AE/AD/techniques as needed.  2) Interventions: OT E Stim Attended, OT Self Care/ADL, OT Community Reintegration, OT Manual Ther Technique, OT Neuro Re-Ed/Balance, OT Sensory Int Techniques, OT Therapeutic Activity, OT Aquatic Therapy, OT Evaluation and OT Therapeutic Exercise               Problem: Dressing     Dates: Start: 12/12/18       Goal: STG-Within one week, patient will dress LB      Dates: Start: 12/24/18       Description: 1) Individualized Goal: With mod A using AE/AD/techniques as needed.  2) Interventions: OT E Stim Attended, OT Self Care/ADL, OT Community Reintegration, OT Manual Ther Technique, OT Neuro Re-Ed/Balance, OT Sensory Int Techniques, OT Therapeutic Activity, OT Aquatic Therapy, OT Evaluation and OT Therapeutic Exercise                Problem: OT Long Term Goals     Dates: Start: 12/12/18       Goal: LTG-By discharge, patient will complete basic self care tasks     Dates: Start: 12/12/18       Description: 1) Individualized Goal:  With setup and supervision using AE/AD/techniques as needed.  2) Interventions:  OT E Stim Attended, OT Self Care/ADL, OT Community Reintegration, OT Manual Ther Technique, OT Neuro Re-Ed/Balance, OT Sensory Int Techniques, OT Therapeutic Activity, OT Aquatic Therapy, OT Evaluation and OT Therapeutic Exercise           Goal: LTG-By discharge, patient will perform bathroom transfers     Dates: Start: 12/12/18       Description: 1) Individualized Goal:  With setup and supervision using AE/AD/techniques as needed.  2) Interventions:  OT E Stim Attended, OT Self Care/ADL, OT Community Reintegration, OT Manual Ther Technique, OT Neuro Re-Ed/Balance, OT Sensory Int Techniques, OT Therapeutic Activity, OT Aquatic Therapy, OT Evaluation and OT Therapeutic Exercise             Problem: Toileting     Dates: Start: 12/24/18       Goal: STG-Within one week, patient will complete toileting tasks     Dates: Start: 12/24/18       Description: 1) Individualized Goal: With CGA using AE/AD/techniques as needed.  2) Interventions: OT E Stim Attended, OT Self Care/ADL, OT Community Reintegration, OT Manual Ther Technique, OT Neuro Re-Ed/Balance, OT Sensory Int Techniques, OT Therapeutic Activity, OT Aquatic Therapy, OT Evaluation and OT Therapeutic Exercise                    Section completed by:  Gunnar Quigley MS,OTR/L             REHAB-Pharmacy IDT Team Note by  Anthony Milligan [facility] at 12/14/2018  2:25 PM  Version 1 of 1    Author:  Anthony Milligan RPH Service:  (none) Author Type:  Pharmacist    Filed:  12/14/2018  2:27 PM Date of Service:  12/14/2018  2:25 PM Status:  Signed    :  Anthony Milligan RPH (Pharmacist)         Pharmacy   Pharmacy  Antibiotics/Antifungals/Antivirals:  Medication:      Active Orders     Ordered     Ordering Provider       Wed Dec 12, 2018  5:07 PM    12/12/18 1707  valACYclovir (VALTREX) caplet 1,000 mg  3 TIMES DAILY      Mere Wade M.D.        Route:         po  Stop Date:  12/18/19  Reason: Left buttock vesicular lesions concerning for Herpes Zoster vs Herpes Simplex  Antihypertensives/Cardiac:  Medication:    Orders (72h ago through future)    Start     Ordered    12/12/18 0900  furosemide (LASIX) tablet 40 mg  DAILY      12/11/18 1132    12/11/18 1132  hydrALAZINE (APRESOLINE) tablet 25 mg  EVERY 8 HOURS PRN      12/11/18 1133        Patient Vitals for the past 24 hrs:   BP Pulse   12/14/18 1405 (!) 96/61 (!) 109   12/14/18 0647 120/87 (!) 110   12/13/18 1955 131/87 (!) 113       Anticoagulation:  Medication: Lovenox    Other key medications: A review of the medication list reveals no issues at this time.    Section completed by: Anthony Milligan [facility][[email or web address]]     Attribution Key     AW.1 - Anthony Milligan [facility] on 12/14/2018  2:25 PM                    DC Planning  DC destination/dispostion:  Patient lives alone in a single level apartment.     DC Needs:  She has a fww, w/c, spc and tub bench.  She also has sequential compression device. Her significant other is supportive but lives in [place].   He will be out of the country for a few weeks. She has supportive friends.  We have completed paperwork for her disability insurance and this has been faxed. She will likely need transition to skilled and referrals have been made.     Barriers to discharge:   Lives alone.  Patient is very distracted and has poor focus.     Strengths:      Section  completed by:  Briana Herzog R.N.         Physician Summary  Brice Harvey MD participated and led team conference discussion.

## 2018-12-25 PROCEDURE — A9270 NON-COVERED ITEM OR SERVICE: HCPCS | Performed by: PHYSICAL MEDICINE & REHABILITATION

## 2018-12-25 PROCEDURE — 700102 HCHG RX REV CODE 250 W/ 637 OVERRIDE(OP): Performed by: PHYSICAL MEDICINE & REHABILITATION

## 2018-12-25 PROCEDURE — 770010 HCHG ROOM/CARE - REHAB SEMI PRIVAT*

## 2018-12-25 PROCEDURE — A9270 NON-COVERED ITEM OR SERVICE: HCPCS | Performed by: HOSPITALIST

## 2018-12-25 PROCEDURE — 700112 HCHG RX REV CODE 229: Performed by: PHYSICAL MEDICINE & REHABILITATION

## 2018-12-25 PROCEDURE — 700111 HCHG RX REV CODE 636 W/ 250 OVERRIDE (IP): Performed by: PHYSICAL MEDICINE & REHABILITATION

## 2018-12-25 PROCEDURE — 700102 HCHG RX REV CODE 250 W/ 637 OVERRIDE(OP): Performed by: HOSPITALIST

## 2018-12-25 PROCEDURE — 99232 SBSQ HOSP IP/OBS MODERATE 35: CPT | Performed by: INTERNAL MEDICINE

## 2018-12-25 RX ADMIN — METHOCARBAMOL 1000 MG: 500 TABLET ORAL at 21:03

## 2018-12-25 RX ADMIN — POTASSIUM CHLORIDE 20 MEQ: 1500 TABLET, EXTENDED RELEASE ORAL at 09:17

## 2018-12-25 RX ADMIN — OXYCODONE HYDROCHLORIDE AND ACETAMINOPHEN 500 MG: 500 TABLET ORAL at 09:34

## 2018-12-25 RX ADMIN — CALCIUM CARBONATE (ANTACID) CHEW TAB 500 MG 500 MG: 500 CHEW TAB at 21:03

## 2018-12-25 RX ADMIN — DOCUSATE SODIUM 100 MG: 100 CAPSULE, LIQUID FILLED ORAL at 21:03

## 2018-12-25 RX ADMIN — MORPHINE SULFATE 30 MG: 30 TABLET, EXTENDED RELEASE ORAL at 05:37

## 2018-12-25 RX ADMIN — METHOCARBAMOL 1000 MG: 500 TABLET ORAL at 09:17

## 2018-12-25 RX ADMIN — CALCIUM CARBONATE (ANTACID) CHEW TAB 500 MG 500 MG: 500 CHEW TAB at 09:18

## 2018-12-25 RX ADMIN — MORPHINE SULFATE 30 MG: 30 TABLET, EXTENDED RELEASE ORAL at 17:39

## 2018-12-25 RX ADMIN — SENNOSIDES AND DOCUSATE SODIUM 1 TABLET: 8.6; 5 TABLET ORAL at 21:03

## 2018-12-25 RX ADMIN — CYANOCOBALAMIN TAB 1000 MCG 1000 MCG: 1000 TAB at 09:18

## 2018-12-25 RX ADMIN — GABAPENTIN 300 MG: 300 CAPSULE ORAL at 21:03

## 2018-12-25 RX ADMIN — METHOCARBAMOL 1000 MG: 500 TABLET ORAL at 14:32

## 2018-12-25 RX ADMIN — FERROUS SULFATE TAB 325 MG (65 MG ELEMENTAL FE) 325 MG: 325 (65 FE) TAB at 09:34

## 2018-12-25 RX ADMIN — OXYCODONE HYDROCHLORIDE 10 MG: 10 TABLET ORAL at 21:06

## 2018-12-25 RX ADMIN — FUROSEMIDE 40 MG: 40 TABLET ORAL at 09:18

## 2018-12-25 RX ADMIN — ENOXAPARIN SODIUM 40 MG: 100 INJECTION SUBCUTANEOUS at 09:16

## 2018-12-25 RX ADMIN — OXYCODONE HYDROCHLORIDE 10 MG: 10 TABLET ORAL at 14:33

## 2018-12-25 RX ADMIN — GABAPENTIN 300 MG: 300 CAPSULE ORAL at 14:32

## 2018-12-25 RX ADMIN — OMEPRAZOLE 20 MG: 20 CAPSULE, DELAYED RELEASE ORAL at 09:17

## 2018-12-25 RX ADMIN — GABAPENTIN 300 MG: 300 CAPSULE ORAL at 09:18

## 2018-12-25 RX ADMIN — ALPRAZOLAM 0.5 MG: 0.5 TABLET ORAL at 21:06

## 2018-12-25 RX ADMIN — VITAMIN D, TAB 1000IU (100/BT) 5000 UNITS: 25 TAB at 09:17

## 2018-12-25 RX ADMIN — OXYCODONE HYDROCHLORIDE 10 MG: 10 TABLET ORAL at 05:37

## 2018-12-25 RX ADMIN — METHOCARBAMOL 1000 MG: 500 TABLET ORAL at 17:39

## 2018-12-25 RX ADMIN — SENNOSIDES AND DOCUSATE SODIUM 1 TABLET: 8.6; 5 TABLET ORAL at 09:17

## 2018-12-25 RX ADMIN — DOCUSATE SODIUM 100 MG: 100 CAPSULE, LIQUID FILLED ORAL at 09:18

## 2018-12-25 ASSESSMENT — ENCOUNTER SYMPTOMS
HEADACHES: 0
TREMORS: 0
EYE PAIN: 0
BLOOD IN STOOL: 0
NAUSEA: 0
CHILLS: 0
STRIDOR: 0
BLURRED VISION: 0
ABDOMINAL PAIN: 0
COUGH: 0
PND: 0
SPEECH CHANGE: 0
CONSTIPATION: 0
FOCAL WEAKNESS: 1
SENSORY CHANGE: 0
FEVER: 0
SPUTUM PRODUCTION: 0
NECK PAIN: 1
DEPRESSION: 0
SHORTNESS OF BREATH: 0
HEARTBURN: 0
PALPITATIONS: 0
WEAKNESS: 1
HEMOPTYSIS: 0
CLAUDICATION: 0
PHOTOPHOBIA: 0
BACK PAIN: 0
NERVOUS/ANXIOUS: 0
SORE THROAT: 0
DIZZINESS: 0
MYALGIAS: 1
TINGLING: 0
VOMITING: 0
DOUBLE VISION: 0
ORTHOPNEA: 0
MEMORY LOSS: 0

## 2018-12-25 ASSESSMENT — PAIN SCALES - GENERAL
PAINLEVEL_OUTOF10: 9
PAINLEVEL_OUTOF10: 7
PAINLEVEL_OUTOF10: 10
PAINLEVEL_OUTOF10: 9
PAINLEVEL_OUTOF10: ASSUMED PAIN PRESENT
PAINLEVEL_OUTOF10: ASSUMED PAIN PRESENT

## 2018-12-26 VITALS
TEMPERATURE: 98.1 F | HEIGHT: 63 IN | RESPIRATION RATE: 18 BRPM | BODY MASS INDEX: 32.5 KG/M2 | WEIGHT: 183.4 LBS | SYSTOLIC BLOOD PRESSURE: 100 MMHG | HEART RATE: 100 BPM | OXYGEN SATURATION: 98 % | DIASTOLIC BLOOD PRESSURE: 66 MMHG

## 2018-12-26 PROCEDURE — 700102 HCHG RX REV CODE 250 W/ 637 OVERRIDE(OP): Performed by: PHYSICAL MEDICINE & REHABILITATION

## 2018-12-26 PROCEDURE — A9270 NON-COVERED ITEM OR SERVICE: HCPCS | Performed by: PHYSICAL MEDICINE & REHABILITATION

## 2018-12-26 PROCEDURE — 97530 THERAPEUTIC ACTIVITIES: CPT

## 2018-12-26 PROCEDURE — 700111 HCHG RX REV CODE 636 W/ 250 OVERRIDE (IP): Performed by: PHYSICAL MEDICINE & REHABILITATION

## 2018-12-26 PROCEDURE — 700112 HCHG RX REV CODE 229: Performed by: PHYSICAL MEDICINE & REHABILITATION

## 2018-12-26 PROCEDURE — 97535 SELF CARE MNGMENT TRAINING: CPT

## 2018-12-26 PROCEDURE — 99231 SBSQ HOSP IP/OBS SF/LOW 25: CPT | Performed by: INTERNAL MEDICINE

## 2018-12-26 PROCEDURE — 700102 HCHG RX REV CODE 250 W/ 637 OVERRIDE(OP): Performed by: HOSPITALIST

## 2018-12-26 PROCEDURE — 97110 THERAPEUTIC EXERCISES: CPT

## 2018-12-26 PROCEDURE — 99239 HOSP IP/OBS DSCHRG MGMT >30: CPT | Performed by: PHYSICAL MEDICINE & REHABILITATION

## 2018-12-26 PROCEDURE — A9270 NON-COVERED ITEM OR SERVICE: HCPCS | Performed by: HOSPITALIST

## 2018-12-26 RX ORDER — OXYCODONE HYDROCHLORIDE 10 MG/1
5-10 TABLET ORAL EVERY 4 HOURS PRN
Qty: 9 TAB | Refills: 0 | Status: SHIPPED | OUTPATIENT
Start: 2018-12-26 | End: 2018-12-29

## 2018-12-26 RX ORDER — FERROUS SULFATE 325(65) MG
325 TABLET ORAL
Qty: 30 TAB | Status: ON HOLD
Start: 2018-12-27 | End: 2019-02-28

## 2018-12-26 RX ORDER — METHOCARBAMOL 500 MG/1
1000 TABLET, FILM COATED ORAL 4 TIMES DAILY
Qty: 120 TAB | Status: ON HOLD
Start: 2018-12-26 | End: 2019-02-28

## 2018-12-26 RX ORDER — BISACODYL 10 MG
10 SUPPOSITORY, RECTAL RECTAL DAILY
Refills: 0
Start: 2018-12-26 | End: 2019-02-04

## 2018-12-26 RX ORDER — FUROSEMIDE 40 MG/1
40 TABLET ORAL DAILY
Qty: 30 TAB
Start: 2018-12-27 | End: 2019-01-03 | Stop reason: SDUPTHER

## 2018-12-26 RX ORDER — AMOXICILLIN 250 MG
1 CAPSULE ORAL 2 TIMES DAILY
Qty: 30 TAB | Refills: 0 | Status: SHIPPED | OUTPATIENT
Start: 2018-12-26 | End: 2019-02-04

## 2018-12-26 RX ORDER — ALPRAZOLAM 0.5 MG/1
0.5 TABLET ORAL 3 TIMES DAILY PRN
Qty: 5 TAB | Refills: 0 | Status: SHIPPED | OUTPATIENT
Start: 2018-12-26 | End: 2018-12-29

## 2018-12-26 RX ORDER — OMEPRAZOLE 20 MG/1
20 CAPSULE, DELAYED RELEASE ORAL DAILY
Qty: 30 CAP | Status: ON HOLD
Start: 2018-12-27 | End: 2019-02-28

## 2018-12-26 RX ORDER — ASCORBIC ACID 500 MG
500 TABLET ORAL
Qty: 30 TAB | Refills: 3 | Status: SHIPPED | OUTPATIENT
Start: 2018-12-27 | End: 2021-06-17

## 2018-12-26 RX ORDER — MORPHINE SULFATE 30 MG/1
30 TABLET, FILM COATED, EXTENDED RELEASE ORAL 2 TIMES DAILY
Qty: 6 TAB | Refills: 0 | Status: SHIPPED | OUTPATIENT
Start: 2018-12-26 | End: 2018-12-29

## 2018-12-26 RX ADMIN — FUROSEMIDE 40 MG: 40 TABLET ORAL at 09:10

## 2018-12-26 RX ADMIN — VITAMIN D, TAB 1000IU (100/BT) 5000 UNITS: 25 TAB at 09:10

## 2018-12-26 RX ADMIN — CYANOCOBALAMIN TAB 1000 MCG 1000 MCG: 1000 TAB at 09:10

## 2018-12-26 RX ADMIN — FERROUS SULFATE TAB 325 MG (65 MG ELEMENTAL FE) 325 MG: 325 (65 FE) TAB at 09:10

## 2018-12-26 RX ADMIN — METHOCARBAMOL 1000 MG: 500 TABLET ORAL at 12:28

## 2018-12-26 RX ADMIN — OMEPRAZOLE 20 MG: 20 CAPSULE, DELAYED RELEASE ORAL at 09:10

## 2018-12-26 RX ADMIN — OXYCODONE HYDROCHLORIDE AND ACETAMINOPHEN 500 MG: 500 TABLET ORAL at 09:10

## 2018-12-26 RX ADMIN — CALCIUM CARBONATE (ANTACID) CHEW TAB 500 MG 500 MG: 500 CHEW TAB at 09:11

## 2018-12-26 RX ADMIN — SENNOSIDES AND DOCUSATE SODIUM 1 TABLET: 8.6; 5 TABLET ORAL at 09:10

## 2018-12-26 RX ADMIN — OXYCODONE HYDROCHLORIDE 10 MG: 10 TABLET ORAL at 06:03

## 2018-12-26 RX ADMIN — OXYCODONE HYDROCHLORIDE 10 MG: 10 TABLET ORAL at 15:57

## 2018-12-26 RX ADMIN — GABAPENTIN 300 MG: 300 CAPSULE ORAL at 09:10

## 2018-12-26 RX ADMIN — MORPHINE SULFATE 30 MG: 30 TABLET, EXTENDED RELEASE ORAL at 06:03

## 2018-12-26 RX ADMIN — OXYCODONE HYDROCHLORIDE 10 MG: 10 TABLET ORAL at 12:28

## 2018-12-26 RX ADMIN — METHOCARBAMOL 1000 MG: 500 TABLET ORAL at 09:09

## 2018-12-26 RX ADMIN — POTASSIUM CHLORIDE 20 MEQ: 1500 TABLET, EXTENDED RELEASE ORAL at 09:11

## 2018-12-26 RX ADMIN — ENOXAPARIN SODIUM 40 MG: 100 INJECTION SUBCUTANEOUS at 09:19

## 2018-12-26 RX ADMIN — DOCUSATE SODIUM 100 MG: 100 CAPSULE, LIQUID FILLED ORAL at 09:10

## 2018-12-26 RX ADMIN — GABAPENTIN 300 MG: 300 CAPSULE ORAL at 15:57

## 2018-12-26 ASSESSMENT — ENCOUNTER SYMPTOMS
WEAKNESS: 1
BLOOD IN STOOL: 0
STRIDOR: 0
DEPRESSION: 0
DOUBLE VISION: 0
FOCAL WEAKNESS: 1
COUGH: 0
CONSTIPATION: 0
EYE PAIN: 0
VOMITING: 0
PALPITATIONS: 0
ORTHOPNEA: 0
BLURRED VISION: 0
PHOTOPHOBIA: 0
NERVOUS/ANXIOUS: 0
HEMOPTYSIS: 0
SPUTUM PRODUCTION: 0
CLAUDICATION: 0
BACK PAIN: 0
NECK PAIN: 1
ABDOMINAL PAIN: 0
SHORTNESS OF BREATH: 0
CHILLS: 0
DIZZINESS: 0
MEMORY LOSS: 0
HEADACHES: 0
SENSORY CHANGE: 0
FEVER: 0
HEARTBURN: 0
SORE THROAT: 0
SPEECH CHANGE: 0
MYALGIAS: 1
NAUSEA: 0
TREMORS: 0
PND: 0
TINGLING: 0

## 2018-12-26 ASSESSMENT — PAIN SCALES - GENERAL
PAINLEVEL_OUTOF10: 10
PAINLEVEL_OUTOF10: 9
PAINLEVEL_OUTOF10: 8
PAINLEVEL_OUTOF10: 8

## 2018-12-26 NOTE — PROGRESS NOTES
Hospital Medicine Daily Progress Note        Chief Complaint  Edema    Interval Problem Update  12/17: Leg swelling better.  12/18: Waiting to go to the bathroom.   12/19: Blood pressure unremarkable.  Completing valacyclovir.  Patient is asking for small SCDs as well as concurrent dosing of oxycodone and MS Contin which she tolerated this morning.  Discussed with rehab Dr. Kimble.    12/20  No acute issues overnight patient restless otherwise comfortable watching TV.  Was asking for smaller SCDs otherwise denies having any chest pain shortness of breath or nausea vomiting. Her overall pain is well controlled on current regimen.  Continue Lasix for diuresis  Continue rehab per Dr. Harvey    12/21  Overall in good spirits, was discussing about her SCDs not fitting perfectly, reassurance was provided. Progressing well overall  Continue with exercises.   Resume lasix, note borderline BP, however patient asymptomatic.    12/22  In good spirits, no acute complaints, doing well overall. Says her pain is better managed.   Resume lasix despite noted incontinence  Continue with therapy.  Check bmp to make sure electrolytes stable     12/23  Patient was complaining of left hip discomfort, said her therapy is too intensive and her orthopaedic didn't want her to put that much pressure on it. Otherwise Patient denies fevers/chills, chest pain, shortness of breath or nausea/vommiting.   Continue therapy with above consideration.  Anemia has improved increase Hgb.    12/24  Patient feeling well, reports improvement in lower extremity edema. Left hip pain improving. Patient denies fevers/chills, chest pain, shortness of breath or nausea/vommiting.   Resume lasix.    12/25: No new issues or complaints.  Tolerating Lasix with no complications.      Review of Systems  Review of Systems   Constitutional: Positive for malaise/fatigue. Negative for chills and fever.   HENT: Negative for congestion, hearing loss, sore throat and  tinnitus.    Eyes: Negative for blurred vision, double vision, photophobia and pain.   Respiratory: Negative for cough, hemoptysis, sputum production, shortness of breath and stridor.    Cardiovascular: Positive for leg swelling (improving). Negative for chest pain, palpitations, orthopnea, claudication and PND.   Gastrointestinal: Negative for abdominal pain, blood in stool, constipation, heartburn, melena, nausea and vomiting.   Genitourinary: Negative for dysuria, frequency and urgency.        Incontinence    Musculoskeletal: Positive for joint pain, myalgias and neck pain. Negative for back pain.   Skin: Negative for rash.   Neurological: Positive for focal weakness and weakness. Negative for dizziness, tingling, tremors, sensory change, speech change and headaches.   Psychiatric/Behavioral: Negative for depression, memory loss and suicidal ideas. The patient is not nervous/anxious.         Physical Exam  Temp:  [36.3 °C (97.4 °F)-36.8 °C (98.3 °F)] 36.8 °C (98.3 °F)  Pulse:  [] 92  Resp:  [16-18] 18  BP: (107-126)/(62-78) 126/78    Physical Exam   Constitutional: She is oriented to person, place, and time. She appears well-developed and well-nourished. No distress.   HENT:   Head: Normocephalic and atraumatic.   Mouth/Throat: No oropharyngeal exudate.   Eyes: Pupils are equal, round, and reactive to light. Conjunctivae are normal. Right eye exhibits no discharge. Left eye exhibits no discharge. No scleral icterus.   Neck: Neck supple. No JVD present. No thyromegaly present.   Cervical collar in place   Cardiovascular: Normal rate, regular rhythm, normal heart sounds and intact distal pulses.    No murmur heard.  Pulmonary/Chest: Effort normal and breath sounds normal. No stridor. No respiratory distress. She has no wheezes. She has no rales.   Abdominal: Soft. Bowel sounds are normal. She exhibits no distension. There is no tenderness. There is no rebound and no guarding.   Musculoskeletal: Normal range  of motion. She exhibits edema (+1/2 pitting edema b/l L/E).   +2 pitting edema bilateral lower extremities, gradual improvement,    Neurological: She is alert and oriented to person, place, and time. No cranial nerve deficit.   Skin: Skin is warm. No rash noted. She is not diaphoretic. No erythema. No pallor.   Psychiatric: She has a normal mood and affect. Her behavior is normal. Judgment and thought content normal.   Nursing note and vitals reviewed.      Fluids    Intake/Output Summary (Last 24 hours) at 12/25/18 1601  Last data filed at 12/25/18 0300   Gross per 24 hour   Intake                0 ml   Output              900 ml   Net             -900 ml       Laboratory  Recent Labs      12/23/18   0630   WBC  7.8   RBC  3.99*   HEMOGLOBIN  10.1*   HEMATOCRIT  30.6*   MCV  76.7*   MCH  25.3*   MCHC  33.0*   RDW  50.5*   PLATELETCT  421   MPV  9.8     Recent Labs      12/23/18   0630   SODIUM  144   POTASSIUM  3.9   CHLORIDE  107   CO2  27   GLUCOSE  99   BUN  7*   CREATININE  0.57   CALCIUM  9.8                   Assessment/Plan  * Cervical myelopathy (HCC)   Assessment & Plan    S/P cervical laminectomy and fusion  Maintain Cervical collar     Anemia   Assessment & Plan    Continue with PO iron supplementation  Upon discharge recommend outpatient GI appointment for diagnostic colonoscopy       Rash   Assessment & Plan    Completed 7 days of Valtrex for possible herpes zoster rash       Vitamin D deficiency   Assessment & Plan    Continue with vitamin D supplementation     Edema   Assessment & Plan    Her echocardiogram showed preserved LVEF of 60%  Lower extremity duplex was negative for DVTs  Resume Lasix at current dose, improving LE edema overall   Difficult to monitor urine output since patient has incontinence   Blood pressure overall well controlled        Left hip pain- (present on admission)   Assessment & Plan    Continue with gentle therapy.  Pain control      Essential hypertension- (present on  admission)   Assessment & Plan    Controlled  Only on lasix, continue at current dose        History of gastric bypass- (present on admission)   Assessment & Plan    Resume as needed Tums  Continue with omeprazole for GI prophylaxis           Patient's CODE STATUS is full code    Total time:  30 minutes.  I spent greater than 50% of the time for patient care, counseling, and coordination on this date, including unit/floor time, and face-to-face time with the patient as per interval events and assessment and plan above

## 2018-12-26 NOTE — DISCHARGE SUMMARY
Rehabitation Discharge Summary    Admission Date: 12/11/2018    Discharge Date: 12/26/2018      Attending Provider:  Palomo Harper MD and Brice Harvey MD    Admission Diagnosis:   Active Hospital Problems    Diagnosis   • *Cervical myelopathy (HCC)   • Edema   • Vitamin D deficiency   • Rash   • Anemia   • Left hip pain   • Obesity (BMI 30-39.9)   • Primary osteoarthritis of left hip- dr nowak; left THR tbd feb, 2019   • Essential hypertension   • Vitamin B 12 deficiency   • History of gastric bypass       Discharge Diagnosis:  Active Hospital Problems    Diagnosis   • *Cervical myelopathy (HCC)   • Edema   • Vitamin D deficiency   • Rash   • Anemia   • Left hip pain   • Obesity (BMI 30-39.9)   • Primary osteoarthritis of left hip- dr nowak; left THR tbd feb, 2019   • Essential hypertension   • Vitamin B 12 deficiency   • History of gastric bypass       HPI per H&P by Dr. Harper:  The patient is a 53 y.o. female with a past medical history of HTN, Edema, Hypokalemia, Anemia, and chronic pain including bilateral hips, knees and back who was admitted on 12/5/18 for scheduled cervical intervention. Per patient she was having worsening symptoms into her bilateral arms prior to this procedure with mild weakness in her arms.  She underwent a two stage procedure with Dr Mendoza for her severe cervical spondylolisthesis, stenosis and myelopathy.  She underwent a ACDF C3-6 with C4 and C5 corpectomy and cage on 12/5/18 and C3-C5 laminectomies with C3-T1 posterior fusion on 12/7/18.  Patient tolerated the procedures well but developed weakness after surgery.  Patient did have dysphagia after her procedures and was last evaluated by SLP on 12/10/18 recommending dysphagia III with hin liquid diet.     Patient reports she has constipation/accidents from chronic opiate use. She also has urinary incontinence and frequency prior to this which she reports was due to difficulty getting to the restroom.  She reports normal  sensation when going to the restroom although once she needs to go she cannot stop herself.  She reports her numbness if from her forearms down and she feels now the right arm is a little weaker but also limited by pain. She was recently weaned off of IV pain medications. At baseline she is on MS Contin 30 mg BID and Percocet.  She is followed by outside pain doctor. She reports she is hoping to have a left hip replacement in a few months after healing from the current procedure.  She was previously mostly wheelchair bound with help from friends.  Denies NVD. Denies SOB or cough.   Patient was admitted to [facility] on 12/11/2018.     Hospital Course by Problem List:  Cervical Myelopathy/C4 AIS C nontraumatic SCI  Status post 2 stage cervical decompression by Dr. Mendoza with ACDF C3-6 on 12/5/18 and C3-C5 laminectomies with C3-T1 posterior fusion on 12/7/18   She made very gradual progress with rehabilitation and will need ongoing therapy at a skilled nursing facility.  We had several conversations about the need to push herself to participate with therapies in order to maximize her improvement.  Her right upper limb the exam was somewhat variable, but there was a gradual improvement in right upper limb function noted.  We frequently reiterated the importance of cervical precautions and collar use.     Chronic pain  Postoperative pain  She began refusing Tylenol every 6 hours.  She was continued on gabapentin, Robaxin, MS Contin, and oxycodone.  We discussed that we would not assume further prescriptions for her pain medications.  She was provided with a 3-day supply only to transition to the skilled nursing facility.  No further pain medications will be written by inpatient rehabilitation providers.  She will need to follow-up with her primary care provider or pain management provider after discharge for any further prescriptions.  We continue to encourage her to wean the use of these medications  and discussed the significant risks of continuing them long-term.  We attempted to wean her oxycodone, but she was unwilling to do so.     Dysphagia --resolved  Speech and language pathology advanced diet to regular with thins     Neurogenic bladder  We continue to encourage the patient to be proactive about timed voiding.  She was resistant to this.  We discussed the combination of neurogenic bladder and Lasix.  We discussed the risk for skin breakdown and potential infection or long-term morbidity/mortality.     Neurogenic Bowel  Opioid-induced constipation  We continue to encourage a daily bowel regimen with suppository due to her neurogenic bowel and opioid-induced constipation, but she was not willing to work with this program.  We also will work to schedule toileting, but she continued to have incontinence due to her refusal of medications.     Lower limb edema  Hypokalemia  She will need to continue on her Lasix dosing with potassium supplementation as long as she is on Lasix.  She should follow-up with her primary care provider.     Anemia   Continue B12 and iron  Hemoglobin stable at 10.1 on   Continue to monitor     Internal medicine has recommended outpatient colonoscopy to follow-up for iron deficiency anemia     Left buttock lesion  This was present on admission  Hospitalist believes likely herpes simplex versus HPV  Valacyclovir completed      Vitamin B12 deficiency  Vitamin D deficiency  Continue B12 supplementation  Vitamin D was 14 on admission, so continue supplementation with 5000 units daily       Functional Status at Discharge  Eatin - Standby Prompting/Supervision or Set-up  Eating Description:  Set-up of equipment or meal/tube feeding (requires setup to open packages )  Groomin - Standby Prompting/Supervision or Set-up  Grooming Description:  Increased time, Set-up of equipment (oral care, wash.dry hands)  Bathin - Max Assistance  Bathing Description:  Tub  bench, Long handled bath tool, Hand held shower, Supervision for safety, Increased time, Verbal cueing, Set-up of equipment (assist w/ 7/10 tasks)  Upper Body Dressin - Standby Prompting/Supervision or Set-up  Upper Body Dressing Description:  Increased time, Supervision for safety, Set-up of equipment (setup to don pullover dress, SBA to stand to pull down over hips/LEs)  Lower Body Dressin - Max Assistance  Lower Body Dressing Description:  2 - Max Assistance  Discharge Location : Skilled Nursing Facility  Patient Discharging with Assist of: Other (See Comments)  Level of Supervision Required: Intermittent Supervision  Recommended Equipment for Discharge: None  Recommended Services Upon Discharge: Other (See Comments) (OT at SNF)  Long Term Goals Met: 0  Long Term Goals Not Met: 2  Reason(s) for Goals Not Met: pain, poor motivation for independence, impaired R UE function  Criteria for Termination of Services: Maximum Function Achieved for Inpatient Rehabilitation  Walk:  1 - Total Assistance  Distance Walked:  Walks less than 50 feet  Walk Description:  Two hand rails, Supervision for safety, Extra time (Pt. amb. in // bars x 24 ft. with SBA with step to gait pattern.)  Wheelchair:  2 - Max Assistance  Distance Propelled:  Propels  feet   Wheelchair Description:  Extra time, Supervision for safety (130' x 1 R LE and LUE, SPV)  Stairs 0 - Not tested,medical condition  Stairs Description   Discharge Location: Skilled Nursing Facility  Patient Discharging with Assist of: Other (See Comments) ([facility])  Level of Supervision Required Upon Discharge: Intermittent Supervision  Comprehension Mode:  Both  Comprehension:  6 - Modified Independent  Comprehension Description:  Glasses  Expression Mode:  Both  Expression:  6 - Modified Independent  Expression Description:  Verbal cueing  Social Interaction:  7 - Independent  Social Interaction Description:  Increased time  Problem Solvin - Standby  "Prompting/Supervision or Set-up  Problem Solving Description:  Verbal cueing, Increased time, Therapy schedule  Memory:  5 - Standby Prompting/Supervision or Set-up  Memory Description:  Verbal cueing, Supervision, Therapy schedule       Vital signs:  VITAL SIGNS: BP (!) 92/62   Pulse 95   Temp 36.6 °C (97.9 °F) (Temporal)   Resp 18   Ht 1.6 m (5' 3\")   Wt 83.2 kg (183 lb 6.4 oz)   LMP  (LMP Unknown)   SpO2 96%   Breastfeeding? No   BMI 32.49 kg/m²     Physical Examination 12/26/2018:  General:  Awake, alert, oriented, no acute distress  HEENT: Wearing Aspen cervical collar  Cardiac: regular rate and rhythm  Lungs: clear to auscultation bilaterally.   Abdomen: soft; non tender, non distended, bowel sounds present and normoactive  Extremities: 1-2+ edema in the bilateral lower limbs which is stable  Neuro:   Ongoing variability in right upper limb function.  Right elbow flexor continues to appear to be 3+/5 with observation at baseline uses, but with formal manual muscle testing, this decreases to 2+/5.  Right finger flexion is noted to be 4/5.      Discharge Medication:    I performed a medication reconciliation at discharge and no potential clinically significant medication issues were identified       Medication List      START taking these medications      Instructions   ascorbic acid 500 MG tablet  Start taking on:  12/27/2018  Commonly known as:  VITAMIN C   Take 1 Tab by mouth every morning with breakfast.  Dose:  500 mg     ferrous sulfate 325 (65 Fe) MG tablet  Start taking on:  12/27/2018   Take 1 Tab by mouth every morning with breakfast.  Dose:  325 mg     morphine ER 30 MG Tbcr tablet  Commonly known as:  MS CONTIN  Replaces:  Morphine Sulfate ER 30 MG T12a   Take 1 Tab by mouth 2 times a day for 3 days.  Dose:  30 mg     omeprazole 20 MG delayed-release capsule  Start taking on:  12/27/2018  Commonly known as:  PRILOSEC   Take 1 Cap by mouth every day.  Dose:  20 mg        CHANGE how you take " these medications      Instructions   bisacodyl 10 MG Supp  What changed:  · when to take this  · reasons to take this  · additional instructions  Commonly known as:  DULCOLAX   Insert 1 Suppository in rectum every day.  Dose:  10 mg     Cholecalciferol 5000 units Tabs  Start taking on:  12/27/2018  What changed:  · medication strength  · how much to take   Take 5,000 Units by mouth every day.  Dose:  5000 Units     oxyCODONE immediate release 10 MG immediate release tablet  What changed:  · medication strength  · how much to take  · when to take this  · reasons to take this  · Another medication with the same name was removed. Continue taking this medication, and follow the directions you see here.  Commonly known as:  ROXICODONE   Take 0.5-1 Tabs by mouth every four hours as needed (5 mg moderate, 10 mg severe pain) for up to 3 days.  Dose:  5-10 mg     senna-docusate 8.6-50 MG Tabs  What changed:  · when to take this  · Another medication with the same name was removed. Continue taking this medication, and follow the directions you see here.  Commonly known as:  PERICOLACE or SENOKOT S   Take 1 Tab by mouth 2 Times a Day.  Dose:  1 Tab        CONTINUE taking these medications      Instructions   ALPRAZolam 0.5 MG Tabs  Commonly known as:  XANAX   Take 1 Tab by mouth 3 times a day as needed for Anxiety or Sleep for up to 3 days.  Dose:  0.5 mg     calcium carbonate 500 MG Chew  Commonly known as:  TUMS   Take 1 Tab by mouth 2 Times a Day.  Dose:  500 mg     cyanocobalamin 1000 MCG Tabs  Commonly known as:  VITAMIN B12   Take 1 Tab by mouth every day.  Dose:  1000 mcg     docusate sodium 100 MG Caps   Take 100 mg by mouth 2 Times a Day.  Dose:  100 mg     furosemide 40 MG Tabs  Start taking on:  12/27/2018  Commonly known as:  LASIX   Take 1 Tab by mouth every day.  Dose:  40 mg     gabapentin 300 MG Caps  Commonly known as:  NEURONTIN   Take 1 Cap by mouth 3 times a day.  Dose:  300 mg     methocarbamol 500 MG  Tabs  Commonly known as:  ROBAXIN   Take 2 Tabs by mouth 4 times a day.  Dose:  1000 mg     potassium chloride SA 20 MEQ Tbcr  Commonly known as:  Marquita   Doctor's comments:  REPLACES 10 MEQ DOSE SENT EARLIER TODAY  Take 1 Tab by mouth every day.  Dose:  20 mEq        STOP taking these medications    benzocaine-menthol 15-3.6 MG Lozg  Commonly known as:  CEPACOL     diphenhydrAMINE 25 MG Tabs  Commonly known as:  BENADRYL     enoxaparin 40 MG/0.4ML Soln inj  Commonly known as:  LOVENOX     fleet 7-19 GM/118ML Enem     hydrALAZINE 20 MG/ML Soln  Commonly known as:  APRESOLINE     magnesium hydroxide 400 MG/5ML Susp  Commonly known as:  MILK OF MAGNESIA     Morphine Sulfate ER 30 MG T12a  Replaced by:  morphine ER 30 MG Tbcr tablet     multivitamin Tabs     neomycin sulf/polymyx B sulf/HC soln 3.5-20080-1 Soln  Commonly known as:  CORTISPORIN HC SOL     NS Soln     ondansetron 4 MG Tbdp  Commonly known as:  ZOFRAN ODT     ondansetron 4 MG/2ML Soln injection  Commonly known as:  ZOFRAN     Pharmacy Consult Request     polyethylene glycol/lytes Pack  Commonly known as:  MIRALAX     promethazine 12.5 MG Supp  Commonly known as:  PHENERGAN     promethazine 12.5 MG tablet  Commonly known as:  PHENERGAN        ASK your doctor about these medications      Instructions   prochlorperazine 5 MG/ML injection  Commonly known as:  COMPAZINE   1-2 mL by Intravenous route every four hours as needed (if no relief from ondansetron or promethazine or if not ordered).  Dose:  5-10 mg            Discharge Diet:  Regular with thin liquids    Discharge Activity:  As tolerated    Disposition:  Skilled nursing facility due to lack of family support supporting discharge and slow progress in therapies    Follow-up:  DISCHARGE INSTRUCTIONS:  Follow up with your primary care provider (PCP) within 7-10 days of discharge to review your medications and take over your care.     If you develop chest pain, fever, chills, change in neurologic function  (weakness, sensation changes, vision changes), or other concerning symptoms, seek immediate medical attention or call 911.      Follow up: please see Case Management Discharge Instructions for follow up appointment information, DME information, and other useful discharge planning information.     Do not drive until cleared by your PCP and you are off of all opioids    Condition on Discharge:  Stable     35 minutes was spent on discharging this patient, including face-to-face time, prescription management, and the dictation of this note.    Brice Harvey M.D.  12/26/2018

## 2018-12-26 NOTE — DISCHARGE INSTRUCTIONS
+    [facility] NURSING DISCHARGE INSTRUCTIONS    Blood Pressure: 100/66  Weight: 83.2 kg (183 lb 6.4 oz)  Nursing recommendations for Karine Ovalle at time of discharge are as follows:  Client verbalized understanding of all discharge instructions and prescriptions.     Review all your home medications and newly ordered medications with your doctor and/or pharmacist. Follow medication instructions as directed by your doctor and/or pharmacist.    Pain Management:   Discharge Pain Medication Instructions:  Comfort Goal: Comfort with Movement, Comfort at Rest  Notify your primary care provider if pain is unrelieved with these measures, if the pain is new, or increased in intensity.    Discharge Skin Characteristics: Warm, Dry  Discharge Skin Exam: Clear     Skin / Wound Care Instructions: Please contact your primary care physician for any change in skin integrity.     If You Have Surgical Incisions / Wounds:  Monitor surgical site(s) for signs of increased swelling, redness or symptoms of drainage from the site or fever as this could indicate signs and symptoms of infection. If these symptoms are noted, notifiy your primary care provider.      Discharge Safety Instructions: Should Not Be Left Alone In The House     Discharge Safety Concerns: Balance Problems (Dizziness, Light Headedness), Unsteady Gait, Weakness  The interdisciplinary team has made recommendation that you should not be left alone  in the house due to weakness and unsteady gait  Anti-embolic stockings are not required to increase circulation to the lower extremities.    Discharge Diet: REGULAR     Discharge Liquids: thin  Discharge Bowel Function: Continent  Please contact your primary care physician for any changes in bowel habits.  Discharge Bowel Program:    Discharge Bladder Function: Incontinent  Discharge Urinary Devices: Brief      Nursing Discharge Plan:   Influenza Vaccine Indication: Not indicated: Previously  immunized this influenza season and > 8 years of age    Case Management Discharge Instructions:   Discharge Location:    Agency Name/Address/Phone:    Home Health:    Outpatient Services:    DME Provider/Phone:    Medical Equipment Ordered:    Prescription Faxed to:        Discharge Medication Instructions:  Below are the medications your physician expects you to take upon discharge:  Cervical Radiculopathy  Introduction  Cervical radiculopathy means that a nerve in the neck is pinched or bruised. This can cause pain or loss of feeling (numbness) that runs from your neck to your arm and fingers.  Follow these instructions at home:  Managing pain  · Take over-the-counter and prescription medicines only as told by your doctor.  · If directed, put ice on the injured or painful area.  ¨ Put ice in a plastic bag.  ¨ Place a towel between your skin and the bag.  ¨ Leave the ice on for 20 minutes, 2-3 times per day.  · If ice does not help, you can try using heat. Take a warm shower or warm bath, or use a heat pack as told by your doctor.  · You may try a gentle neck and shoulder massage.  Activity  · Rest as needed. Follow instructions from your doctor about any activities to avoid.  · Do exercises as told by your doctor or physical therapist.  General instructions  · If you were given a soft collar, wear it as told by your doctor.  · Use a flat pillow when you sleep.  · Keep all follow-up visits as told by your doctor. This is important.  Contact a doctor if:  · Your condition does not improve with treatment.  Get help right away if:  · Your pain gets worse and is not controlled with medicine.  · You lose feeling or feel weak in your hand, arm, face, or leg.  · You have a fever.  · You have a stiff neck.  · You cannot control when you poop or pee (have incontinence).  · You have trouble with walking, balance, or talking.  This information is not intended to replace advice given to you by your health care provider. Make  sure you discuss any questions you have with your health care provider.  Document Released: 12/06/2012 Document Revised: 05/25/2017 Document Reviewed: 02/11/2016  © 2017 [employer]  How can pain medicine affect me?  You were prescribed pain medicine. This medicine may:  · Make you tired or sleepy.  · Make you feel dizzy.  · Affect how well you can:  ¨ Drive  ¨ Do certain activities.  Pain medicine may not make all of your pain go away. You should be comfortable enough to:  · Move.  · Breathe.  · Take care of yourself.  How often should I take pain medicine and how much should I take?  · Take pain medicine only as told by your doctor and only as needed for pain.  · You do not need to take pain medicine if you are not having pain, unless your doctor tells you to do that.  · You can take less than the prescribed dose if you find that less medicine helps your pain.  · If you have very bad (severe) pain, call your doctor. Do not take more pills than told by your doctor. Do not take pills more often than told by your doctor.  What should I avoid while I am taking pain medicine?  Follow these instructions after you start taking pain medicine, while you are taking the medicine, and for 8 hours after you stop taking the medicine:  · Do not drive.  · Do not use machinery.  · Do not use power tools.  · Do not sign legal documents.  · Do not drink alcohol.  · Do not take sleeping pills.  · Do not take care of children by yourself.  · Do not do any activities that involve climbing or being in high places.  · Do not go into any body of water unless there is an adult nearby who can watch and help you. This includes:  ¨ Lakes.  ¨ Rivers.  ¨ Oceans.  ¨ Spas.  ¨ Swimming pools.  How can I keep others safe while I am taking pain medicine?  · Store your pain medicine as told by your doctor. Make sure that you keep it where children and pets cannot reach it.  · Do not share your pain medicine with anyone.  · Do not save any leftover  pills. If you have any leftover pain medicine, get rid of it or destroy it as told by your doctor.  What else do I need to know about taking pain medicine?  · Use a poop (stool) softener if you have trouble pooping (constipation) because of your pain medicine. Eating more fruits and vegetables also helps with constipation.  · Write down the times when you take your pain medicine. Look at the times before you take your next dose of medicine.  · Your pain medicine might have acetaminophen in it. Do not take any other acetaminophen while you are taking this medicine. An overdose of acetaminophen can do very bad damage to your liver. If you are taking any medicines in addition to your pain medicine, check the active ingredients on those medicines to see if acetaminophen is listed.  When should I call my doctor?  · Your medicine is not helping the pain.  · You do either of these soon after you take the medicine:  ¨ Throw up (vomit).  ¨ Have watery poop (diarrhea).  · You have new pain in areas that did not hurt before.  · You have an allergic reaction to your medicine. This may include:  ¨ Feeling itchy.  ¨ Swelling.  ¨ Feeling dizzy.  ¨ Getting a new rash.  · You cannot put up with feeling:  ¨ Dizzy.  ¨ Sick to your stomach (nauseous).  When should I call 911 or go to the emergency room?  · You pass out (faint).  · You feel very confused.  · You throw up again and again.  · Your skin or lips turn pale or bluish in color.  · You are:  ¨ Short of breath.  ¨ Breathing much more slowly than usual.  · You have a very bad allergic reaction to your medicine. This includes:  ¨ Developing a swollen tongue.  ¨ Having trouble breathing.  This information is not intended to replace advice given to you by your health care provider. Make sure you discuss any questions you have with your health care provider.  Document Released: 06/05/2009 Document Revised: 08/24/2017 Document Reviewed: 10/22/2015  © 2017  [employer]    Hypertension  Hypertension is another name for high blood pressure. High blood pressure forces your heart to work harder to pump blood. A blood pressure reading has two numbers, which includes a higher number over a lower number (example: 110/72).  Follow these instructions at home:  · Have your blood pressure rechecked by your doctor.  · Only take medicine as told by your doctor. Follow the directions carefully. The medicine does not work as well if you skip doses. Skipping doses also puts you at risk for problems.  · Do not smoke.  · Monitor your blood pressure at home as told by your doctor.  Contact a doctor if:  · You think you are having a reaction to the medicine you are taking.  · You have repeat headaches or feel dizzy.  · You have puffiness (swelling) in your ankles.  · You have trouble with your vision.  Get help right away if:  · You get a very bad headache and are confused.  · You feel weak, numb, or faint.  · You get chest or belly (abdominal) pain.  · You throw up (vomit).  · You cannot breathe very well.  This information is not intended to replace advice given to you by your health care provider. Make sure you discuss any questions you have with your health care provider.  Document Released: 06/05/2009 Document Revised: 05/25/2017 Document Reviewed: 10/10/2014  [employer] Interactive Patient Education © 2017 [employer]

## 2018-12-27 ENCOUNTER — TELEPHONE (OUTPATIENT)
Dept: MEDICAL GROUP | Age: 53
End: 2018-12-27

## 2018-12-27 NOTE — PROGRESS NOTES
Hospital Medicine Daily Progress Note        Chief Complaint  Edema    Interval Problem Update  12/17: Leg swelling better.  12/18: Waiting to go to the bathroom.   12/19: Blood pressure unremarkable.  Completing valacyclovir.  Patient is asking for small SCDs as well as concurrent dosing of oxycodone and MS Contin which she tolerated this morning.  Discussed with rehab Dr. Kimble.    12/20  No acute issues overnight patient restless otherwise comfortable watching TV.  Was asking for smaller SCDs otherwise denies having any chest pain shortness of breath or nausea vomiting. Her overall pain is well controlled on current regimen.  Continue Lasix for diuresis  Continue rehab per Dr. Harvey    12/21  Overall in good spirits, was discussing about her SCDs not fitting perfectly, reassurance was provided. Progressing well overall  Continue with exercises.   Resume lasix, note borderline BP, however patient asymptomatic.    12/22  In good spirits, no acute complaints, doing well overall. Says her pain is better managed.   Resume lasix despite noted incontinence  Continue with therapy.  Check bmp to make sure electrolytes stable     12/23  Patient was complaining of left hip discomfort, said her therapy is too intensive and her orthopaedic didn't want her to put that much pressure on it. Otherwise Patient denies fevers/chills, chest pain, shortness of breath or nausea/vommiting.   Continue therapy with above consideration.  Anemia has improved increase Hgb.    12/24  Patient feeling well, reports improvement in lower extremity edema. Left hip pain improving. Patient denies fevers/chills, chest pain, shortness of breath or nausea/vommiting.   Resume lasix.    12/25: No new issues or complaints.  Tolerating Lasix with no complications.  12/26: No acute issues overnight.  Anticipated discharge today.    Review of Systems  Review of Systems   Constitutional: Positive for malaise/fatigue. Negative for chills and fever.    HENT: Negative for congestion, hearing loss, sore throat and tinnitus.    Eyes: Negative for blurred vision, double vision, photophobia and pain.   Respiratory: Negative for cough, hemoptysis, sputum production, shortness of breath and stridor.    Cardiovascular: Positive for leg swelling (improving). Negative for chest pain, palpitations, orthopnea, claudication and PND.   Gastrointestinal: Negative for abdominal pain, blood in stool, constipation, heartburn, melena, nausea and vomiting.   Genitourinary: Negative for dysuria, frequency and urgency.        Incontinence    Musculoskeletal: Positive for joint pain, myalgias and neck pain. Negative for back pain.   Skin: Negative for rash.   Neurological: Positive for focal weakness and weakness. Negative for dizziness, tingling, tremors, sensory change, speech change and headaches.   Psychiatric/Behavioral: Negative for depression, memory loss and suicidal ideas. The patient is not nervous/anxious.         Physical Exam  Temp:  [36.6 °C (97.9 °F)-37.3 °C (99.1 °F)] 36.7 °C (98.1 °F)  Pulse:  [] 100  Resp:  [18] 18  BP: ()/(62-83) 100/66    Physical Exam   Constitutional: She is oriented to person, place, and time. She appears well-developed and well-nourished. No distress.   HENT:   Head: Normocephalic and atraumatic.   Mouth/Throat: No oropharyngeal exudate.   Eyes: Pupils are equal, round, and reactive to light. Conjunctivae are normal. Right eye exhibits no discharge. Left eye exhibits no discharge. No scleral icterus.   Neck: Neck supple. No JVD present. No thyromegaly present.   Cervical collar in place   Cardiovascular: Normal rate, regular rhythm, normal heart sounds and intact distal pulses.    No murmur heard.  Pulmonary/Chest: Effort normal and breath sounds normal. No stridor. No respiratory distress. She has no wheezes. She has no rales.   Abdominal: Soft. Bowel sounds are normal. She exhibits no distension. There is no tenderness. There is  no rebound and no guarding.   Musculoskeletal: Normal range of motion. She exhibits edema (+1/2 pitting edema b/l L/E).   +2 pitting edema bilateral lower extremities, gradual improvement,    Neurological: She is alert and oriented to person, place, and time. No cranial nerve deficit.   Skin: Skin is warm. No rash noted. She is not diaphoretic. No erythema. No pallor.   Psychiatric: She has a normal mood and affect. Her behavior is normal. Judgment and thought content normal.   Nursing note and vitals reviewed.      Fluids    Intake/Output Summary (Last 24 hours) at 12/26/18 1609  Last data filed at 12/26/18 1200   Gross per 24 hour   Intake              600 ml   Output              700 ml   Net             -100 ml       Laboratory                        Assessment/Plan  * Cervical myelopathy (HCC)   Assessment & Plan    S/P cervical laminectomy and fusion  Maintain Cervical collar     Anemia   Assessment & Plan    Continue with PO iron supplementation  Upon discharge recommend outpatient GI appointment for diagnostic colonoscopy       Rash   Assessment & Plan    Completed 7 days of Valtrex for possible herpes zoster rash       Vitamin D deficiency   Assessment & Plan    Continue with vitamin D supplementation     Edema   Assessment & Plan    Her echocardiogram showed preserved LVEF of 60%  Lower extremity duplex was negative for DVTs  Resume Lasix at current dose, improving LE edema overall   Difficult to monitor urine output since patient has incontinence   Blood pressure overall well controlled        Left hip pain- (present on admission)   Assessment & Plan    Continue with gentle therapy.  Pain control      Essential hypertension- (present on admission)   Assessment & Plan    Controlled  Only on lasix, continue at current dose        History of gastric bypass- (present on admission)   Assessment & Plan    Resume as needed Tums  Continue with omeprazole for GI prophylaxis           Patient's CODE STATUS is full  code    Total time:  30 minutes.  I spent greater than 50% of the time for patient care, counseling, and coordination on this date, including unit/floor time, and face-to-face time with the patient as per interval events and assessment and plan above

## 2018-12-27 NOTE — DISCHARGE PLANNING
Case Management;  Late entry for 12/26/18.  Insurance auth obtained for [facility] and patient accepted for transfer.  Dr. Miranda will be accepting physician.  They scheduled transport  for 4 pm.  Patient, staff and physician updated.  Transfer paperwork complete.  Patient verbalizes agreement with plans and understanding of next steps.  Also, faxed additional disability paperwork completed by patient to [employer] per her request.       CASE MANAGEMENT PLAN OF CARE   Individualized Goals:   1. Patient would like assistance with leave paperwork and insurance policies.  2. I will confirm level of support for transition home.    Outcomes  1. Met: Patient has completed several sets of paperwork for her work disability and these have been faxed in.  2. Met: Patient's significant other provides intermittant support and will be out of the country for several weeks, patient transitioned to skilled rehab, in hopes of increasing independence.

## 2018-12-27 NOTE — PROGRESS NOTES
Patient discharged to [facility] per order.  Discharge instructions reviewed with patient; she verbalize understanding and signed copies placed in chart.  Patient has all belongings; signed copy of form in chart.  Patient left facility at 4:22 PM via [facility] accompanied by rehab staff and transport.  Have enjoyed working with this pleasant patient.

## 2019-01-03 ENCOUNTER — OFFICE VISIT (OUTPATIENT)
Dept: MEDICAL GROUP | Age: 54
End: 2019-01-03
Payer: COMMERCIAL

## 2019-01-03 VITALS
OXYGEN SATURATION: 97 % | TEMPERATURE: 98.3 F | HEART RATE: 96 BPM | HEIGHT: 63 IN | WEIGHT: 185 LBS | BODY MASS INDEX: 32.78 KG/M2 | SYSTOLIC BLOOD PRESSURE: 132 MMHG | DIASTOLIC BLOOD PRESSURE: 72 MMHG

## 2019-01-03 DIAGNOSIS — R60.9 EDEMA, UNSPECIFIED TYPE: ICD-10-CM

## 2019-01-03 DIAGNOSIS — G95.9 CERVICAL MYELOPATHY (HCC): ICD-10-CM

## 2019-01-03 DIAGNOSIS — M16.12 PRIMARY OSTEOARTHRITIS OF LEFT HIP: ICD-10-CM

## 2019-01-03 DIAGNOSIS — Z74.09 IMPAIRED MOBILITY: ICD-10-CM

## 2019-01-03 DIAGNOSIS — M50.30 DDD (DEGENERATIVE DISC DISEASE), CERVICAL: ICD-10-CM

## 2019-01-03 DIAGNOSIS — M54.42 CHRONIC BILATERAL LOW BACK PAIN WITH BILATERAL SCIATICA: ICD-10-CM

## 2019-01-03 DIAGNOSIS — E66.9 OBESITY (BMI 30-39.9): ICD-10-CM

## 2019-01-03 DIAGNOSIS — G89.29 CHRONIC BILATERAL LOW BACK PAIN WITH BILATERAL SCIATICA: ICD-10-CM

## 2019-01-03 DIAGNOSIS — M54.41 CHRONIC BILATERAL LOW BACK PAIN WITH BILATERAL SCIATICA: ICD-10-CM

## 2019-01-03 DIAGNOSIS — N39.46 MIXED STRESS AND URGE URINARY INCONTINENCE: ICD-10-CM

## 2019-01-03 DIAGNOSIS — I10 ESSENTIAL HYPERTENSION: ICD-10-CM

## 2019-01-03 DIAGNOSIS — F51.01 PRIMARY INSOMNIA: ICD-10-CM

## 2019-01-03 DIAGNOSIS — J45.20 MILD INTERMITTENT ASTHMA WITHOUT COMPLICATION: ICD-10-CM

## 2019-01-03 PROBLEM — M25.552 LEFT HIP PAIN: Status: RESOLVED | Noted: 2018-09-18 | Resolved: 2019-01-03

## 2019-01-03 PROBLEM — R21 RASH: Status: RESOLVED | Noted: 2018-12-13 | Resolved: 2019-01-03

## 2019-01-03 PROCEDURE — 99215 OFFICE O/P EST HI 40 MIN: CPT | Performed by: INTERNAL MEDICINE

## 2019-01-03 RX ORDER — ALPRAZOLAM 0.25 MG/1
0.25 TABLET ORAL NIGHTLY PRN
COMMUNITY
End: 2019-01-05 | Stop reason: ALTCHOICE

## 2019-01-03 RX ORDER — FUROSEMIDE 40 MG/1
40 TABLET ORAL DAILY
Qty: 30 TAB | Refills: 5 | Status: ON HOLD | OUTPATIENT
Start: 2019-01-03 | End: 2019-02-28

## 2019-01-03 RX ORDER — GABAPENTIN 300 MG/1
300 CAPSULE ORAL 3 TIMES DAILY
Qty: 90 CAP | Refills: 5 | Status: ON HOLD | OUTPATIENT
Start: 2019-01-03 | End: 2019-02-28

## 2019-01-03 ASSESSMENT — ENCOUNTER SYMPTOMS
GASTROINTESTINAL NEGATIVE: 1
CARDIOVASCULAR NEGATIVE: 1
PSYCHIATRIC NEGATIVE: 1
EYES NEGATIVE: 1
MUSCULOSKELETAL NEGATIVE: 1
CONSTITUTIONAL NEGATIVE: 1
RESPIRATORY NEGATIVE: 1
NEUROLOGICAL NEGATIVE: 1

## 2019-01-03 NOTE — PROGRESS NOTES
Subjective:     Karine Ovalle is a 53 y.o. female who presents with Medication Refill    HPI   The patient presents today for medication refill. On 12/5/18, patient had a cervical disk and fusion anterior procedure involving C4-5, followed by a corpectomy on the same day. On 12/7/18, patient had a cervical fusion posterior procedure involving C3-5 and C3-T1, followed by a cervical laminectomy posterior procedure involving C2-T1. These procedures were performed by Dr. Emre Mendoza. Patient was admitted to the hospital on 12/5/18 and discharged on 12/26/18. She followed up with her pain management doctor and is here today requesting a refill for Gabapentin 300 mg TID and Lasix 40 mg QD. At this time patient presents in a cervical collar and requires a wheelchair for movement. She endorses associated urinary incontinence (can't get out of wheelchair fast enough). She denies any dysuria. She is also requesting a prescription for adult diapers for her incontinence and states she goes through 4 diapers a day.     Patient has her left hip surgery scheduled on 2/6/18.     In addition, the patient is also here for follow up of chronic medical problems listed below. The patient is compliant with their medications and is having no side effects from them.       History of Vitamin B-12 deficiency currently controlled with cyanocobalamin 1000 mcg QD.     History of Vitamin D deficiency currently treated with Vitamin D 5000 units QD. Vitamin D lab drawn on 12/12/18 reported a decreased value of 14.     History of hypertension not currently controlled with any medications. Patient presents today with a BP reading of 132/72.    History of anemia currently controlled with ferrous sulfate 325 mg QAM. Chem panel performed on 12/16/18 reported a decreased iron level of 27, total iron binding of 389, and a decreased % saturation of 7.     History of edema controlled with Lasix 40 mg QD and potassium chloride 20 MEQ QD.     Patient  Active Problem List   Diagnosis   • History of gastric bypass   • Mild intermittent asthma without complication   • Vitamin B 12 deficiency   • Chronic bilateral low back pain with bilateral sciatica- pain mgt; KYLAH ADKINS   • Colon cancer screening- needs   • Venous insufficiency   • Uncomplicated opioid dependence (CMS-HCC)- kylah adkins   • Essential hypertension   • DDD (degenerative disc disease), lumbar   • Primary osteoarthritis of left hip- dr nowak; left THR tbd feb 6, 2019   • Obesity (BMI 30-39.9)   • DDD (degenerative disc disease), cervical- MOD-SEVERE SPINE NV- dr brennan; fusion and laminectomy C3-T1 12/5/2018 dr brennan   • 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 30 Tab 3   • bisacodyl (DULCOLAX) 10 MG Suppos Insert 1 Suppository in rectum every day.  0   • ferrous sulfate 325 (65 Fe) MG tablet Take 1 Tab by mouth every morning with breakfast. 30 Tab    • methocarbamol (ROBAXIN) 500 MG Tab Take 2 Tabs by mouth 4 times a day. 120 Tab    • omeprazole (PRILOSEC) 20 MG delayed-release capsule Take 1 Cap by mouth every day. 30 Cap    • senna-docusate (PERICOLACE OR SENOKOT S) 8.6-50 MG Tab Take 1 Tab by mouth 2 Times a Day. 30 Tab 0   • vitamin D 5000 units Tab Take 5,000 Units by mouth every day. 60 Tab    • docusate sodium 100 MG Cap Take 100 mg by mouth 2 Times a Day. 60 Cap    • calcium carbonate (TUMS) 500 MG Chew Tab Take 1 Tab by mouth 2 Times a Day. 30 Tab 0   • potassium chloride SA (KDUR) 20 MEQ Tab CR Take 1 Tab by mouth every day. 90 Tab 4   • cyanocobalamin (VITAMIN B12) 1000 MCG Tab Take 1 Tab by mouth every day. 90 Tab 4   • furosemide (LASIX) 40 MG Tab Take 1 Tab by  "mouth every day. 30 Tab    • gabapentin (NEURONTIN) 300 MG Cap Take 1 Cap by mouth 3 times a day. 90 Cap 0     No facility-administered medications prior to visit.         No Known Allergies    Review of Systems   Constitutional: Negative.    HENT: Negative.    Eyes: Negative.    Respiratory: Negative.    Cardiovascular: Negative.    Gastrointestinal: Negative.    Genitourinary: Negative for dysuria.        Positive for Urinary Incontinence    Musculoskeletal: Negative.    Skin: Negative.    Neurological: Negative.    Endo/Heme/Allergies: Negative.    Psychiatric/Behavioral: Negative.    All other systems reviewed and are negative.         Objective:     /72 (BP Location: Left arm, Patient Position: Sitting)   Pulse 96   Temp 36.8 °C (98.3 °F) (Temporal)   Ht 1.6 m (5' 3\")   Wt 83.9 kg (185 lb)   LMP  (LMP Unknown)   SpO2 97%   BMI 32.77 kg/m²     Physical Exam   Constitutional: Oriented to person, place, and time. Appears well-developed and well-nourished. No distress.   Head: Normocephalic and atraumatic.   Right Ear: External ear normal.   Left Ear: External ear normal.   Nose: Nose normal.   Mouth/Throat: Oropharynx is clear and moist. No oropharyngeal exudate.   Eyes: Pupils are equal, round, and reactive to light. Conjunctivae and EOM are normal. Right eye exhibits no discharge. Left eye exhibits no discharge. No scleral icterus.   Neck: Normal range of motion. Neck supple. No JVD present. No tracheal deviation present. No thyromegaly present, Cervical collar in place.   Cardiovascular: Normal rate, regular rhythm, normal heart sounds and intact distal pulses.  Exam reveals no gallop and no friction rub.    No murmur heard.  Pulmonary/Chest: Effort normal. No stridor. No respiratory distress. No wheezing or rales. No tenderness.   Abdominal: Soft. Bowel sounds are normal. No distension and no mass. There is no tenderness. There is no rebound and no guarding. No hernia.   Musculoskeletal: Normal " range of motion No edema or tenderness.   Lymphadenopathy: No cervical adenopathy.   Neurological: Alert and oriented to person, place, and time. Normal reflexes. Normal reflexes. No cranial nerve deficit. Normal muscle tone. Coordination normal.   Skin: Skin is warm and dry. No rash noted. Not diaphoretic. No erythema. No pallor.   Psychiatric: Normal mood and affect. Behavior is normal. Judgment and thought content normal.   Nursing note and vitals reviewed.    Admission on 12/11/2018, Discharged on 12/26/2018   Component Date Value   • WBC 12/12/2018 8.4    • RBC 12/12/2018 3.83*   • Hemoglobin 12/12/2018 9.8*   • Hematocrit 12/12/2018 29.8*   • MCV 12/12/2018 77.8*   • MCH 12/12/2018 25.6*   • MCHC 12/12/2018 32.9*   • RDW 12/12/2018 50.1*   • Platelet Count 12/12/2018 338    • MPV 12/12/2018 9.8    • Neutrophils-Polys 12/12/2018 59.00    • Lymphocytes 12/12/2018 24.00    • Monocytes 12/12/2018 9.10    • Eosinophils 12/12/2018 6.90    • Basophils 12/12/2018 0.60    • Immature Granulocytes 12/12/2018 0.40    • Nucleated RBC 12/12/2018 0.00    • Neutrophils (Absolute) 12/12/2018 4.93    • Lymphs (Absolute) 12/12/2018 2.01    • Monos (Absolute) 12/12/2018 0.76    • Eos (Absolute) 12/12/2018 0.58*   • Baso (Absolute) 12/12/2018 0.05    • Immature Granulocytes (a* 12/12/2018 0.03    • NRBC (Absolute) 12/12/2018 0.00    • Sodium 12/12/2018 138    • Potassium 12/12/2018 3.7    • Chloride 12/12/2018 101    • Co2 12/12/2018 31    • Anion Gap 12/12/2018 6.0    • Glucose 12/12/2018 100*   • Bun 12/12/2018 10    • Creatinine 12/12/2018 0.53    • Calcium 12/12/2018 9.0    • AST(SGOT) 12/12/2018 16    • ALT(SGPT) 12/12/2018 10    • Alkaline Phosphatase 12/12/2018 82    • Total Bilirubin 12/12/2018 0.3    • Albumin 12/12/2018 2.9*   • Total Protein 12/12/2018 6.0    • Globulin 12/12/2018 3.1    • A-G Ratio 12/12/2018 0.9    • Glycohemoglobin 12/12/2018 5.9*   • Est Avg Glucose 12/12/2018 123    • TSH 12/12/2018 2.960    •  25-Hydroxy   Vitamin D 25 12/12/2018 14*   • GFR If  12/12/2018 >60    • GFR If Non  Ameri* 12/12/2018 >60    • Eject.Frac. MOD BP 12/13/2018 58.72    • Eject.Frac. MOD 4C 12/13/2018 52.63    • Eject.Frac. MOD 2C 12/13/2018 64.58    • Left Ventrical Ejection * 12/13/2018 60    • WBC 12/16/2018 6.2    • RBC 12/16/2018 3.47*   • Hemoglobin 12/16/2018 9.0*   • Hematocrit 12/16/2018 27.0*   • MCV 12/16/2018 77.8*   • MCH 12/16/2018 25.9*   • MCHC 12/16/2018 33.3*   • RDW 12/16/2018 49.8    • Platelet Count 12/16/2018 385    • MPV 12/16/2018 9.5    • Sodium 12/16/2018 139    • Potassium 12/16/2018 3.9    • Chloride 12/16/2018 104    • Co2 12/16/2018 30    • Glucose 12/16/2018 102*   • Bun 12/16/2018 8    • Creatinine 12/16/2018 0.53    • Calcium 12/16/2018 8.9    • Anion Gap 12/16/2018 5.0    • Iron 12/16/2018 27*   • Total Iron Binding 12/16/2018 389    • % Saturation 12/16/2018 7*   • GFR If  12/16/2018 >60    • GFR If Non  Ameri* 12/16/2018 >60    • WBC 12/23/2018 7.8    • RBC 12/23/2018 3.99*   • Hemoglobin 12/23/2018 10.1*   • Hematocrit 12/23/2018 30.6*   • MCV 12/23/2018 76.7*   • MCH 12/23/2018 25.3*   • MCHC 12/23/2018 33.0*   • RDW 12/23/2018 50.5*   • Platelet Count 12/23/2018 421    • MPV 12/23/2018 9.8    • Sodium 12/23/2018 144    • Potassium 12/23/2018 3.9    • Chloride 12/23/2018 107    • Co2 12/23/2018 27    • Anion Gap 12/23/2018 10.0    • Glucose 12/23/2018 99    • Bun 12/23/2018 7*   • Creatinine 12/23/2018 0.57    • Calcium 12/23/2018 9.8    • AST(SGOT) 12/23/2018 14    • ALT(SGPT) 12/23/2018 10    • Alkaline Phosphatase 12/23/2018 106*   • Total Bilirubin 12/23/2018 0.3    • Albumin 12/23/2018 3.3    • Total Protein 12/23/2018 6.5    • Globulin 12/23/2018 3.2    • A-G Ratio 12/23/2018 1.0    • GFR If  12/23/2018 >60    • GFR If Non  Ameri* 12/23/2018 >60    Admission on 12/05/2018, Discharged on 12/11/2018   Component Date Value    • Sodium 12/08/2018 137    • Potassium 12/08/2018 3.6    • Chloride 12/08/2018 103    • Co2 12/08/2018 29    • Glucose 12/08/2018 119*   • Bun 12/08/2018 7*   • Creatinine 12/08/2018 0.51    • Calcium 12/08/2018 8.6    • Anion Gap 12/08/2018 5.0    • WBC 12/08/2018 13.2*   • RBC 12/08/2018 3.81*   • Hemoglobin 12/08/2018 9.8*   • Hematocrit 12/08/2018 29.9*   • MCV 12/08/2018 78.5*   • MCH 12/08/2018 25.7*   • MCHC 12/08/2018 32.8*   • RDW 12/08/2018 50.8*   • Platelet Count 12/08/2018 336    • MPV 12/08/2018 9.8    • GFR If  12/08/2018 >60    • GFR If Non  Ameri* 12/08/2018 >60    • Sodium 12/09/2018 139    • Potassium 12/09/2018 3.2*   • Chloride 12/09/2018 103    • Co2 12/09/2018 28    • Glucose 12/09/2018 89    • Bun 12/09/2018 7*   • Creatinine 12/09/2018 0.45*   • Calcium 12/09/2018 8.7    • Anion Gap 12/09/2018 8.0    • WBC 12/09/2018 10.7    • RBC 12/09/2018 3.93*   • Hemoglobin 12/09/2018 10.4*   • Hematocrit 12/09/2018 30.3*   • MCV 12/09/2018 77.1*   • MCH 12/09/2018 26.5*   • MCHC 12/09/2018 34.3    • RDW 12/09/2018 49.2    • Platelet Count 12/09/2018 323    • MPV 12/09/2018 9.8    • GFR If  12/09/2018 >60    • GFR If Non  Ameri* 12/09/2018 >60    • WBC 12/10/2018 10.4    • RBC 12/10/2018 3.93*   • Hemoglobin 12/10/2018 10.1*   • Hematocrit 12/10/2018 30.4*   • MCV 12/10/2018 77.4*   • MCH 12/10/2018 25.7*   • MCHC 12/10/2018 33.2*   • RDW 12/10/2018 49.9    • Platelet Count 12/10/2018 329    • MPV 12/10/2018 9.5    • Neutrophils-Polys 12/10/2018 57.30    • Lymphocytes 12/10/2018 29.60    • Monocytes 12/10/2018 10.00    • Eosinophils 12/10/2018 2.20    • Basophils 12/10/2018 0.50    • Immature Granulocytes 12/10/2018 0.40    • Nucleated RBC 12/10/2018 0.00    • Neutrophils (Absolute) 12/10/2018 5.94    • Lymphs (Absolute) 12/10/2018 3.06    • Monos (Absolute) 12/10/2018 1.03*   • Eos (Absolute) 12/10/2018 0.23    • Baso (Absolute) 12/10/2018 0.05    •  Immature Granulocytes (a* 12/10/2018 0.04    • NRBC (Absolute) 12/10/2018 0.00    • Sodium 12/10/2018 137    • Potassium 12/10/2018 3.6    • Chloride 12/10/2018 101    • Co2 12/10/2018 30    • Glucose 12/10/2018 111*   • Bun 12/10/2018 8    • Creatinine 12/10/2018 0.55    • Calcium 12/10/2018 8.7    • Anion Gap 12/10/2018 6.0    • GFR If  12/10/2018 >60    • GFR If Non  Ameri* 12/10/2018 >60    Orders Only on 2018   Component Date Value   • Report 2018                      Value:[facility]    Test Date:  2018  Pt Name:    ANA LAWLER                  Department: [facility]  MRN:        [id number]                      Room:  Gender:     Female                       Technician: [facility]  :        1965                   Requested By:AYESHA HEALY III  Order #:    [id number]                    Reading MD: Raciel Childs MD    Measurements  Intervals                                Axis  Rate:       83                           P:          32  NY:         148                          QRS:        -5  QRSD:       86                           T:          26  QT:         372  QTc:        437    Interpretive Statements  SINUS RHYTHM  CONSIDER LEFT VENTRICULAR HYPERTROPHY  BASELINE WANDER IN LEAD(S) V4  Compared to ECG 2018 17:39:19  No significant changes    Electronically Signed On 2018 21:09:54 PST by Raciel Childs MD     • WBC 2018 8.4    • RBC 2018 4.87    • Hemoglobin 2018 12.7    • Hematocrit 2018 37.6    • MCV 2018 77.2*   • MCH 2018 26.1*   • MCHC 2018 33.8    • RDW 2018 49.4    • Platelet Count 2018 467*   • MPV 2018 9.6    • Neutrophils-Polys 2018 63.20    • Lymphocytes 2018 23.10    • Monocytes 2018 7.90    • Eosinophils 2018 5.00    • Basophils 2018 0.60    • Immature Granulocytes 2018 0.20    • Nucleated RBC 2018 0.00    • Neutrophils (Absolute) 2018  5.32    • Lymphs (Absolute) 12/04/2018 1.95    • Monos (Absolute) 12/04/2018 0.67    • Eos (Absolute) 12/04/2018 0.42    • Baso (Absolute) 12/04/2018 0.05    • Immature Granulocytes (a* 12/04/2018 0.02    • NRBC (Absolute) 12/04/2018 0.00    • Sodium 12/04/2018 139    • Potassium 12/04/2018 3.8    • Chloride 12/04/2018 106    • Co2 12/04/2018 24    • Glucose 12/04/2018 86    • Bun 12/04/2018 12    • Creatinine 12/04/2018 0.61    • Calcium 12/04/2018 9.6    • Anion Gap 12/04/2018 9.0    • APTT 12/04/2018 31.2    • PT 12/04/2018 13.4    • INR 12/04/2018 1.01    • Color 12/04/2018 Yellow    • Character 12/04/2018 Cloudy*   • Specific Gravity 12/04/2018 1.017    • Ph 12/04/2018 6.5    • Glucose 12/04/2018 Negative    • Ketones 12/04/2018 Negative    • Protein 12/04/2018 Negative    • Bilirubin 12/04/2018 Negative    • Urobilinogen, Urine 12/04/2018 0.2    • Nitrite 12/04/2018 Positive*   • Leukocyte Esterase 12/04/2018 Large*   • Occult Blood 12/04/2018 Moderate*   • Micro Urine Req 12/04/2018 Microscopic    • Significant Indicator 12/04/2018 POS*   • Source 12/04/2018 UR    • Urine Culture 12/04/2018 *                    Value:Escherichia coli  >100,000 cfu/mL     • WBC 12/04/2018 Packed*   • RBC 12/04/2018 20-50*   • Bacteria 12/04/2018 Many*   • Epithelial Cells 12/04/2018 Negative    • Hyaline Cast 12/04/2018 0-2    • GFR If  12/04/2018 >60    • GFR If Non  Ameri* 12/04/2018 >60       Lab Results   Component Value Date/Time    HBA1C 5.9 (H) 12/12/2018 06:00 AM    HBA1C 5.3 06/12/2017 09:03 AM     Lab Results   Component Value Date/Time    SODIUM 144 12/23/2018 06:30 AM    POTASSIUM 3.9 12/23/2018 06:30 AM    CHLORIDE 107 12/23/2018 06:30 AM    CO2 27 12/23/2018 06:30 AM    GLUCOSE 99 12/23/2018 06:30 AM    BUN 7 (L) 12/23/2018 06:30 AM    CREATININE 0.57 12/23/2018 06:30 AM    CREATININE 0.88 08/14/2010 12:00 AM    BUNCREATRAT 13 08/14/2010 12:00 AM    GLOMRATE >59 08/14/2010 12:00 AM     ALKPHOSPHAT 106 (H) 12/23/2018 06:30 AM    ASTSGOT 14 12/23/2018 06:30 AM    ALTSGPT 10 12/23/2018 06:30 AM    TBILIRUBIN 0.3 12/23/2018 06:30 AM     Lab Results   Component Value Date/Time    INR 1.01 12/04/2018 11:15 AM    INR 1.04 05/18/2012 12:20 PM     Lab Results   Component Value Date/Time    CHOLSTRLTOT 168 02/22/2018 05:05 PM    LDL 84 02/22/2018 05:05 PM    HDL 67 02/22/2018 05:05 PM    TRIGLYCERIDE 85 02/22/2018 05:05 PM       No results found for: TESTOSTERONE  Lab Results   Component Value Date/Time    TSH 2.110 08/14/2010 12:00 AM     Lab Results   Component Value Date/Time    FREET4 0.72 05/03/2016 07:32 AM     Lab Results   Component Value Date/Time    URICACID 5.6 08/12/2014 04:49 PM     No components found for: VITB12  Lab Results   Component Value Date/Time    25HYDROXY 14 (L) 12/12/2018 06:00 AM    25HYDROXY 33 08/12/2014 04:49 PM          Assessment/Plan:     1. DDD (degenerative disc disease), cervical- MOD-SEVERE SPINE NV- dr brennan; fusion and laminectomy C3-T1 12/5/2018 dr brennan  Patient referred to Home Health.   - gabapentin (NEURONTIN) 300 MG Cap; Take 1 Cap by mouth 3 times a day.  Dispense: 90 Cap; Refill: 5  - REFERRAL TO HOME HEALTH-patient is doing poorly at home due to lack of ongoing support postoperatively.  She is now 1 month post cervical fusion and laminectomy multilevel and is still confined to wheelchair and requiring regular physical therapy.  Postoperatively she went to inpatient rehab for 2 weeks, and was transferred to an ECF as it was felt she was not well enough to manage with outpatient PT and OT.  She therefore will be referred for home health with in-home PT and OT and other supportive measures as deemed necessary by the home health nurse.    He did have a long talk today and does not appear that she and her significant other capable of frequent trips to therapy and would therefore benefit from in-home therapy.    2. Chronic bilateral low back pain with bilateral  sciatica- pain mgt; TERESA ROSA  Patient referred to Home Health.   - gabapentin (NEURONTIN) 300 MG Cap; Take 1 Cap by mouth 3 times a day.  Dispense: 90 Cap; Refill: 5  - REFERRAL TO HOME HEALTH    3. Essential hypertension  Under good control. Continue same regimen. Patient presents today with a BP reading of 132/72.      4. Obesity (BMI 30-39.9)  Patient counseled to exercise, diet, and lose 15 lbs.      5. Mild intermittent asthma without complication  Patient referred to Home Health.   - REFERRAL TO HOME HEALTH    6. Primary insomnia  Under good control. Continue same regimen.   - ALPRAZolam (XANAX) 0.25 MG Tab; Take 0.25 mg by mouth at bedtime as needed for Sleep.    7. Primary osteoarthritis of left hip- dr nowak; left THR tbd feb, 2019  Under good control. Continue same regimen.     8. Mixed stress and urge urinary incontinence  Patient referred to Home Health.   - Misc. Devices Misc; Adult briefs XXL (FEMALE) FOR URINARY INCONTINENCE- ANY BRAND PER PT PREFERENCE OR INSURANC ALLOWANCE; TO CHANGE QID  Dispense: 120 Each; Refill: 5  - REFERRAL TO HOME HEALTH    9. Impaired mobility  Patient referred to Home Health.   - REFERRAL TO HOME HEALTH    10. Cervical myelopathy (HCC)  Patient referred to Home Health.   - gabapentin (NEURONTIN) 300 MG Cap; Take 1 Cap by mouth 3 times a day.  Dispense: 90 Cap; Refill: 5  - REFERRAL TO HOME HEALTH    11. Edema, unspecified type  Under good control. Continue same regimen.   - furosemide (LASIX) 40 MG Tab; Take 1 Tab by mouth every day.  Dispense: 30 Tab; Refill: 5     Follow-up in 2 months.     40 minute face-to-face encounter took place today.  More than half of this time was spent in the coordination of care of the above problems, as well as counseling.    Ramu IRELAND (Scribe), am scribing for, and in the presence of, Micky Rubin M.D.    Electronically signed by: Ramu George (Scribe), 1/3/2019    Micky IRELAND M.D., personally performed the services described  in this documentation, as scribed by Ramu George in my presence, and it is both accurate and complete.

## 2019-01-04 ENCOUNTER — HOME HEALTH ADMISSION (OUTPATIENT)
Dept: HOME HEALTH SERVICES | Facility: HOME HEALTHCARE | Age: 54
End: 2019-01-04
Payer: COMMERCIAL

## 2019-01-05 ENCOUNTER — HOME CARE VISIT (OUTPATIENT)
Dept: HOME HEALTH SERVICES | Facility: HOME HEALTHCARE | Age: 54
End: 2019-01-05
Payer: COMMERCIAL

## 2019-01-05 VITALS
RESPIRATION RATE: 16 BRPM | BODY MASS INDEX: 31.89 KG/M2 | SYSTOLIC BLOOD PRESSURE: 100 MMHG | HEART RATE: 89 BPM | OXYGEN SATURATION: 95 % | WEIGHT: 180 LBS | DIASTOLIC BLOOD PRESSURE: 70 MMHG

## 2019-01-05 PROCEDURE — 665001 SOC-HOME HEALTH

## 2019-01-05 PROCEDURE — G0493 RN CARE EA 15 MIN HH/HOSPICE: HCPCS

## 2019-01-05 SDOH — ECONOMIC STABILITY: HOUSING INSECURITY: UNSAFE COOKING RANGE AREA: 0

## 2019-01-05 SDOH — ECONOMIC STABILITY: HOUSING INSECURITY: UNSAFE APPLIANCES: 0

## 2019-01-05 ASSESSMENT — PATIENT HEALTH QUESTIONNAIRE - PHQ9
2. FEELING DOWN, DEPRESSED, IRRITABLE, OR HOPELESS: 00
1. LITTLE INTEREST OR PLEASURE IN DOING THINGS: 00
CLINICAL INTERPRETATION OF PHQ2 SCORE: 0

## 2019-01-05 ASSESSMENT — ENCOUNTER SYMPTOMS
NAUSEA: PT DENIES ANY NAUSEA AT THIS TIME
DEBILITATING PAIN: 1
VOMITING: PT DENIES ANY EMESIS AT THIS TIME

## 2019-01-05 ASSESSMENT — ACTIVITIES OF DAILY LIVING (ADL): OASIS_M1830: 03

## 2019-01-06 NOTE — PROGRESS NOTES
DATE OF SERVICE:  12/19/2018    This session was devoted almost entirely to talking about the patient's goals   as she anticipates her discharge date.  Some of the challenges the patient   faces were explored.  She was encouraged to raise questions with the medical   staff given her concerns.       ____________________________________     MARIBEL FINK, PHD    JENIFFER / RICHARD    DD:  01/06/2019 10:35:09  DT:  01/06/2019 12:28:33    D#:  [id number]  Job#:  [id number]

## 2019-01-06 NOTE — PROGRESS NOTES
DATE OF SERVICE:  12/18/2018    The patient is doing fairly well at followup.  She remains anxious about her   medical condition and angry that some of her complaints were ignored prior to   her hospitalization by her physicians.  The patient said she is meeting all of   her goals and making good progress.  She understands that she will need   extended care.  The patient's overall adjustment and some of the issues she is   challenged by were talked about at length.       ____________________________________     MARIBEL FINK, PHD    JENIFFER / RICHARD    DD:  01/06/2019 09:47:37  DT:  01/06/2019 12:03:07    D#:  [id number]  Job#:  [id number]

## 2019-01-07 ENCOUNTER — HOME CARE VISIT (OUTPATIENT)
Dept: HOME HEALTH SERVICES | Facility: HOME HEALTHCARE | Age: 54
End: 2019-01-07
Payer: COMMERCIAL

## 2019-01-07 ENCOUNTER — TELEPHONE (OUTPATIENT)
Dept: HEALTH INFORMATION MANAGEMENT | Facility: OTHER | Age: 54
End: 2019-01-07

## 2019-01-07 VITALS
DIASTOLIC BLOOD PRESSURE: 70 MMHG | OXYGEN SATURATION: 97 % | SYSTOLIC BLOOD PRESSURE: 102 MMHG | HEART RATE: 88 BPM | RESPIRATION RATE: 17 BRPM | TEMPERATURE: 98 F

## 2019-01-07 PROCEDURE — G0151 HHCP-SERV OF PT,EA 15 MIN: HCPCS

## 2019-01-07 ASSESSMENT — ENCOUNTER SYMPTOMS: DEBILITATING PAIN: 1

## 2019-01-07 NOTE — TELEPHONE ENCOUNTER
Referral from [facility]. Med review completed. No clinically significant medication related issues noted.

## 2019-01-09 ENCOUNTER — HOME CARE VISIT (OUTPATIENT)
Dept: HOME HEALTH SERVICES | Facility: HOME HEALTHCARE | Age: 54
End: 2019-01-09
Payer: COMMERCIAL

## 2019-01-10 ENCOUNTER — HOME CARE VISIT (OUTPATIENT)
Dept: HOME HEALTH SERVICES | Facility: HOME HEALTHCARE | Age: 54
End: 2019-01-10
Payer: COMMERCIAL

## 2019-01-10 VITALS
RESPIRATION RATE: 16 BRPM | DIASTOLIC BLOOD PRESSURE: 50 MMHG | OXYGEN SATURATION: 97 % | TEMPERATURE: 98 F | SYSTOLIC BLOOD PRESSURE: 90 MMHG | HEART RATE: 88 BPM

## 2019-01-10 VITALS
OXYGEN SATURATION: 97 % | TEMPERATURE: 98 F | HEART RATE: 88 BPM | DIASTOLIC BLOOD PRESSURE: 50 MMHG | RESPIRATION RATE: 18 BRPM | SYSTOLIC BLOOD PRESSURE: 90 MMHG

## 2019-01-10 PROCEDURE — G0152 HHCP-SERV OF OT,EA 15 MIN: HCPCS

## 2019-01-10 PROCEDURE — G0495 RN CARE TRAIN/EDU IN HH: HCPCS

## 2019-01-10 SDOH — ECONOMIC STABILITY: HOUSING INSECURITY: HOME SAFETY: LOW TOILET. PHONE NUMBER FOR PRIMARY OT WRITTEN ON BINDER.

## 2019-01-10 ASSESSMENT — ACTIVITIES OF DAILY LIVING (ADL)
HOUSEKEEPING_ASSISTANCE: 6
DRESSING_LB_EQUIPMENT_NEEDED: HIP KIT
BATHING_ASSISTANCE: 5
LAUNDRY_ASSISTANCE: 6
SHOPPING_ASSISTANCE: 6
EATING_ASSISTANCE: 1
MEAL_PREP_ASSISTANCE: 6
DRESSING_UB_ASSISTANCE: 4
ORAL_CARE_ASSISTANCE: 4
TRANSPORTATION_ASSISTANCE: 6
DRESSING_LB_ASSISTANCE: 6
TOILETING_ASSISTANCE: 5
TELEPHONE_ASSISTANCE: 0
GROOMING_ASSISTANCE: 4

## 2019-01-10 ASSESSMENT — ENCOUNTER SYMPTOMS
VOMITING: DENIES
DEBILITATING PAIN: 1
NAUSEA: DENIES
DEBILITATING PAIN: 1

## 2019-01-11 ENCOUNTER — HOME CARE VISIT (OUTPATIENT)
Dept: HOME HEALTH SERVICES | Facility: HOME HEALTHCARE | Age: 54
End: 2019-01-11
Payer: COMMERCIAL

## 2019-01-15 ENCOUNTER — HOME CARE VISIT (OUTPATIENT)
Dept: HOME HEALTH SERVICES | Facility: HOME HEALTHCARE | Age: 54
End: 2019-01-15
Payer: COMMERCIAL

## 2019-01-15 VITALS
RESPIRATION RATE: 17 BRPM | HEART RATE: 87 BPM | DIASTOLIC BLOOD PRESSURE: 67 MMHG | SYSTOLIC BLOOD PRESSURE: 118 MMHG | OXYGEN SATURATION: 99 % | TEMPERATURE: 97.8 F

## 2019-01-15 PROCEDURE — G0151 HHCP-SERV OF PT,EA 15 MIN: HCPCS

## 2019-01-15 ASSESSMENT — ENCOUNTER SYMPTOMS: DEBILITATING PAIN: 1

## 2019-01-16 ENCOUNTER — APPOINTMENT (OUTPATIENT)
Dept: MEDICAL GROUP | Age: 54
End: 2019-01-16
Payer: COMMERCIAL

## 2019-01-16 ENCOUNTER — HOME CARE VISIT (OUTPATIENT)
Dept: HOME HEALTH SERVICES | Facility: HOME HEALTHCARE | Age: 54
End: 2019-01-16
Payer: COMMERCIAL

## 2019-01-16 ENCOUNTER — HOSPITAL ENCOUNTER (OUTPATIENT)
Facility: MEDICAL CENTER | Age: 54
End: 2019-01-16
Attending: ORTHOPAEDIC SURGERY | Admitting: ORTHOPAEDIC SURGERY
Payer: COMMERCIAL

## 2019-01-16 VITALS
HEART RATE: 85 BPM | SYSTOLIC BLOOD PRESSURE: 110 MMHG | TEMPERATURE: 98.8 F | RESPIRATION RATE: 16 BRPM | DIASTOLIC BLOOD PRESSURE: 60 MMHG | OXYGEN SATURATION: 95 %

## 2019-01-16 PROCEDURE — G0495 RN CARE TRAIN/EDU IN HH: HCPCS

## 2019-01-17 ENCOUNTER — HOSPITAL ENCOUNTER (OUTPATIENT)
Dept: RADIOLOGY | Facility: MEDICAL CENTER | Age: 54
End: 2019-01-17
Attending: NEUROLOGICAL SURGERY
Payer: COMMERCIAL

## 2019-01-17 ENCOUNTER — HOME CARE VISIT (OUTPATIENT)
Dept: HOME HEALTH SERVICES | Facility: HOME HEALTHCARE | Age: 54
End: 2019-01-17
Payer: COMMERCIAL

## 2019-01-17 VITALS
RESPIRATION RATE: 17 BRPM | DIASTOLIC BLOOD PRESSURE: 60 MMHG | OXYGEN SATURATION: 91 % | HEART RATE: 79 BPM | SYSTOLIC BLOOD PRESSURE: 90 MMHG | TEMPERATURE: 99.8 F

## 2019-01-17 DIAGNOSIS — M47.892 OTHER SPONDYLOSIS, CERVICAL REGION: ICD-10-CM

## 2019-01-17 PROCEDURE — G0152 HHCP-SERV OF OT,EA 15 MIN: HCPCS

## 2019-01-17 PROCEDURE — 72040 X-RAY EXAM NECK SPINE 2-3 VW: CPT

## 2019-01-17 ASSESSMENT — ENCOUNTER SYMPTOMS: DEBILITATING PAIN: 1

## 2019-01-18 ENCOUNTER — HOME CARE VISIT (OUTPATIENT)
Dept: HOME HEALTH SERVICES | Facility: HOME HEALTHCARE | Age: 54
End: 2019-01-18
Payer: COMMERCIAL

## 2019-01-21 ENCOUNTER — HOME CARE VISIT (OUTPATIENT)
Dept: HOME HEALTH SERVICES | Facility: HOME HEALTHCARE | Age: 54
End: 2019-01-21
Payer: COMMERCIAL

## 2019-01-21 PROCEDURE — G0156 HHCP-SVS OF AIDE,EA 15 MIN: HCPCS

## 2019-01-22 ENCOUNTER — HOME CARE VISIT (OUTPATIENT)
Dept: HOME HEALTH SERVICES | Facility: HOME HEALTHCARE | Age: 54
End: 2019-01-22
Payer: COMMERCIAL

## 2019-01-22 VITALS
DIASTOLIC BLOOD PRESSURE: 74 MMHG | OXYGEN SATURATION: 98 % | TEMPERATURE: 98.5 F | SYSTOLIC BLOOD PRESSURE: 114 MMHG | HEART RATE: 87 BPM | RESPIRATION RATE: 17 BRPM

## 2019-01-22 PROCEDURE — G0152 HHCP-SERV OF OT,EA 15 MIN: HCPCS

## 2019-01-22 ASSESSMENT — ENCOUNTER SYMPTOMS: DEBILITATING PAIN: 1

## 2019-01-23 ENCOUNTER — HOME CARE VISIT (OUTPATIENT)
Dept: HOME HEALTH SERVICES | Facility: HOME HEALTHCARE | Age: 54
End: 2019-01-23
Payer: COMMERCIAL

## 2019-01-23 VITALS
DIASTOLIC BLOOD PRESSURE: 70 MMHG | HEART RATE: 76 BPM | RESPIRATION RATE: 16 BRPM | TEMPERATURE: 98.5 F | SYSTOLIC BLOOD PRESSURE: 114 MMHG | OXYGEN SATURATION: 94 %

## 2019-01-23 VITALS
DIASTOLIC BLOOD PRESSURE: 60 MMHG | TEMPERATURE: 98 F | SYSTOLIC BLOOD PRESSURE: 100 MMHG | OXYGEN SATURATION: 98 % | HEART RATE: 89 BPM | RESPIRATION RATE: 17 BRPM

## 2019-01-23 PROCEDURE — G0151 HHCP-SERV OF PT,EA 15 MIN: HCPCS

## 2019-01-23 ASSESSMENT — ENCOUNTER SYMPTOMS: DEBILITATING PAIN: 1

## 2019-01-23 ASSESSMENT — ACTIVITIES OF DAILY LIVING (ADL)
OASIS_M1830: 03
HOME_HEALTH_OASIS: 01

## 2019-01-23 ASSESSMENT — PATIENT HEALTH QUESTIONNAIRE - PHQ9: CLINICAL INTERPRETATION OF PHQ2 SCORE: 0

## 2019-01-29 PROCEDURE — G0180 MD CERTIFICATION HHA PATIENT: HCPCS | Performed by: INTERNAL MEDICINE

## 2019-02-04 VITALS — WEIGHT: 196.65 LBS | HEIGHT: 63 IN | BODY MASS INDEX: 34.84 KG/M2

## 2019-02-04 DIAGNOSIS — Z01.812 PRE-OPERATIVE LABORATORY EXAMINATION: ICD-10-CM

## 2019-02-04 DIAGNOSIS — Z01.810 PRE-OPERATIVE CARDIOVASCULAR EXAMINATION: ICD-10-CM

## 2019-02-04 LAB
ANION GAP SERPL CALC-SCNC: 10 MMOL/L (ref 0–11.9)
BUN SERPL-MCNC: 11 MG/DL (ref 8–22)
CALCIUM SERPL-MCNC: 8.4 MG/DL (ref 8.5–10.5)
CHLORIDE SERPL-SCNC: 103 MMOL/L (ref 96–112)
CO2 SERPL-SCNC: 24 MMOL/L (ref 20–33)
CREAT SERPL-MCNC: 0.53 MG/DL (ref 0.5–1.4)
ERYTHROCYTE [DISTWIDTH] IN BLOOD BY AUTOMATED COUNT: 51.7 FL (ref 35.9–50)
GLUCOSE SERPL-MCNC: 73 MG/DL (ref 65–99)
HCT VFR BLD AUTO: 37.1 % (ref 37–47)
HGB BLD-MCNC: 12.1 G/DL (ref 12–16)
MCH RBC QN AUTO: 26.4 PG (ref 27–33)
MCHC RBC AUTO-ENTMCNC: 32.6 G/DL (ref 33.6–35)
MCV RBC AUTO: 80.8 FL (ref 81.4–97.8)
PLATELET # BLD AUTO: 336 K/UL (ref 164–446)
PMV BLD AUTO: 10.7 FL (ref 9–12.9)
POTASSIUM SERPL-SCNC: 4.3 MMOL/L (ref 3.6–5.5)
RBC # BLD AUTO: 4.59 M/UL (ref 4.2–5.4)
SODIUM SERPL-SCNC: 137 MMOL/L (ref 135–145)
WBC # BLD AUTO: 7.3 K/UL (ref 4.8–10.8)

## 2019-02-04 PROCEDURE — 36415 COLL VENOUS BLD VENIPUNCTURE: CPT

## 2019-02-04 PROCEDURE — 80048 BASIC METABOLIC PNL TOTAL CA: CPT

## 2019-02-04 PROCEDURE — 85027 COMPLETE CBC AUTOMATED: CPT

## 2019-02-04 PROCEDURE — 87641 MR-STAPH DNA AMP PROBE: CPT

## 2019-02-04 PROCEDURE — 87640 STAPH A DNA AMP PROBE: CPT

## 2019-02-04 RX ORDER — NALOXONE HYDROCHLORIDE 4 MG/.1ML
SPRAY NASAL
Status: ON HOLD | COMMUNITY
Start: 2018-12-28 | End: 2019-02-28

## 2019-02-04 RX ORDER — METHOCARBAMOL 750 MG/1
TABLET, FILM COATED ORAL
COMMUNITY
Start: 2018-12-28 | End: 2019-02-04

## 2019-02-04 NOTE — DISCHARGE PLANNING
DISCHARGE PLANNING NOTE - TOTAL JOINT     Procedure: Procedure(s):  HIP ARTHROPLASTY TOTAL - MINIMALLY INVASIVE SURGERY ANTEROLATERAL APPROACH  Procedure Date: 2/13/2019  Insurance:  Payor: [employer] / [employer] / Plan: [employer] / [employer] / Product Type: *No Product type* /   Equipment currently available at home? cane, front-wheel walker and wheelchair  Steps into the home? 2  Steps within the home? none  Toilet height? ADA  Type of shower? tub-shower  Who will be with you during your recovery? Spouse-Contreras  Is Outpatient Physical Therapy set up after surgery? No   Did you take the Total Joint Class and where? Yes, at [facility] in 2018 with right hip     Plan: Met with patient and spouse during preadmission appointment.   Reviewed Equipment Resource list and provided a copy to the patient with Care Chest recommendation.  Provided and reviewed Home Safety Checklist.  Quiet Hours information given and reviewed.  There are no identified discharge needs at this time.  Anticipate discharge home with family.  No Dme needed at this time.    FYI:  Patient is expecting to stay minimum of 3-4 days in house and then transfer to rehab (not [facility]) and then home with [facility].

## 2019-02-04 NOTE — OR NURSING
Patient uses wheelchair r/t hip pain; incontinent  Because cannot get up sometimes to get to commode. No signs of UTI.

## 2019-02-05 LAB
SCCMEC + MECA PNL NOSE NAA+PROBE: NEGATIVE
SCCMEC + MECA PNL NOSE NAA+PROBE: NEGATIVE

## 2019-02-08 NOTE — H&P
DATE OF SURGERY:  02/13/2019 at [facility].    CHIEF COMPLAINT:  Left hip pain.    HISTORY OF PRESENT ILLNESS:  The patient is a 53-year-old female who has got   significant osteoarthritis of multiple joints, has now had severe degenerative   changes in her left hip which had left her in severe pain and difficulty   mobilizing in a wheelchair for good amount of the time, with evidence of   severe degenerative changes.  She is coming in today for left total hip   replacement.    PAST MEDICAL HISTORY:  Significant for obesity, severe osteoarthritis in knees   and hips.  Upper GI symptoms.    She denies any history of major cardiopulmonary disease, no diabetes.  No   history of liver or kidney disease.    SOCIAL HISTORY:  She has never smoked.  She does have a spouse or significant   other.    RECENT SURGERIES:  Include a C-spine fusion just a couple of months ago and   right hip replacement done last year.    CURRENT MEDICATIONS:  Include olopatadine ophthalmic solution, potassium,   furosemide, omeprazole, lidocaine patches, vitamin D, pregabalin, metaxalone,   fish oil.  She is also on morphine tablets and oxycodone for pain management.    ALLERGIES:  No known drug allergies.    FAMILY HISTORY:  Noncontributory.    REVIEW OF SYSTEMS:  Negative for CMS criteria with the exception of just a   chronic opiate requiring pain.    PHYSICAL EXAMINATION:  GENERAL:  She is alert, oriented, in mild-to-moderate amount of distress.  VITAL SIGNS:  BMI 35.42.  EXTREMITIES:  Her upper extremities move well with good range of motion there.    Her neck range of motion was somewhat limited with previous cervical spine   fusion.  She has got good range of motion of the right hip and knee.  The left   leg is shortened.  She gets severe pain with attempts at rotation of her left   hip, left knee moves pretty well.  Foot and ankle were neurovascularly   intact.    RADIOGRAPHS:  X-rays AP pelvis and lateral of the left hip shows severe    destruction of the left hip joint looks complete loss of the joint space,   subchondral sclerosis and peripheral osteophytes, subchondral cyst formation   as well as superior migration of the femoral head.    IMPRESSION:  1.  Severe osteoarthritis, left hip.  2.  Obesity.  3.  Opioid requiring pain.  4.  Status post recent cervical spine fusion and previous right total hip.    PLAN:  Left total hip replacement.  The patient understands the risks and   goals of surgery.       ____________________________________     MD DK LUX / NTS    DD:  02/07/2019 18:44:05  DT:  02/07/2019 22:21:08    D#:  [id number]  Job#:  [id number]

## 2019-02-13 ENCOUNTER — TELEPHONE (OUTPATIENT)
Dept: MEDICAL GROUP | Age: 54
End: 2019-02-13

## 2019-02-13 ENCOUNTER — APPOINTMENT (OUTPATIENT)
Dept: RADIOLOGY | Facility: MEDICAL CENTER | Age: 54
DRG: 470 | End: 2019-02-13
Attending: EMERGENCY MEDICINE
Payer: COMMERCIAL

## 2019-02-13 ENCOUNTER — HOSPITAL ENCOUNTER (INPATIENT)
Facility: MEDICAL CENTER | Age: 54
LOS: 2 days | DRG: 470 | End: 2019-02-15
Attending: EMERGENCY MEDICINE | Admitting: ORTHOPAEDIC SURGERY
Payer: COMMERCIAL

## 2019-02-13 ENCOUNTER — APPOINTMENT (OUTPATIENT)
Dept: RADIOLOGY | Facility: MEDICAL CENTER | Age: 54
DRG: 470 | End: 2019-02-13
Attending: STUDENT IN AN ORGANIZED HEALTH CARE EDUCATION/TRAINING PROGRAM
Payer: COMMERCIAL

## 2019-02-13 DIAGNOSIS — Z96.642 STATUS POST LEFT HIP REPLACEMENT: ICD-10-CM

## 2019-02-13 DIAGNOSIS — S72.002A CLOSED FRACTURE OF PROXIMAL END OF LEFT FEMUR, INITIAL ENCOUNTER (HCC): ICD-10-CM

## 2019-02-13 DIAGNOSIS — E66.9 OBESITY (BMI 30-39.9): ICD-10-CM

## 2019-02-13 LAB
ALBUMIN SERPL BCP-MCNC: 3 G/DL (ref 3.2–4.9)
ALBUMIN/GLOB SERPL: 1 G/DL
ALP SERPL-CCNC: 127 U/L (ref 30–99)
ALT SERPL-CCNC: 7 U/L (ref 2–50)
ANION GAP SERPL CALC-SCNC: 7 MMOL/L (ref 0–11.9)
APTT PPP: 31.3 SEC (ref 24.7–36)
AST SERPL-CCNC: 15 U/L (ref 12–45)
BASOPHILS # BLD AUTO: 0.4 % (ref 0–1.8)
BASOPHILS # BLD: 0.03 K/UL (ref 0–0.12)
BILIRUB SERPL-MCNC: 0.4 MG/DL (ref 0.1–1.5)
BUN SERPL-MCNC: 13 MG/DL (ref 8–22)
CALCIUM SERPL-MCNC: 8.7 MG/DL (ref 8.5–10.5)
CHLORIDE SERPL-SCNC: 103 MMOL/L (ref 96–112)
CO2 SERPL-SCNC: 29 MMOL/L (ref 20–33)
CREAT SERPL-MCNC: 0.6 MG/DL (ref 0.5–1.4)
EOSINOPHIL # BLD AUTO: 0.38 K/UL (ref 0–0.51)
EOSINOPHIL NFR BLD: 4.9 % (ref 0–6.9)
ERYTHROCYTE [DISTWIDTH] IN BLOOD BY AUTOMATED COUNT: 48.1 FL (ref 35.9–50)
GLOBULIN SER CALC-MCNC: 3 G/DL (ref 1.9–3.5)
GLUCOSE SERPL-MCNC: 90 MG/DL (ref 65–99)
HCT VFR BLD AUTO: 34.9 % (ref 37–47)
HGB BLD-MCNC: 11.9 G/DL (ref 12–16)
IMM GRANULOCYTES # BLD AUTO: 0.03 K/UL (ref 0–0.11)
IMM GRANULOCYTES NFR BLD AUTO: 0.4 % (ref 0–0.9)
INR PPP: 1 (ref 0.87–1.13)
LYMPHOCYTES # BLD AUTO: 1.93 K/UL (ref 1–4.8)
LYMPHOCYTES NFR BLD: 25 % (ref 22–41)
MCH RBC QN AUTO: 26.6 PG (ref 27–33)
MCHC RBC AUTO-ENTMCNC: 34.1 G/DL (ref 33.6–35)
MCV RBC AUTO: 78.1 FL (ref 81.4–97.8)
MONOCYTES # BLD AUTO: 0.7 K/UL (ref 0–0.85)
MONOCYTES NFR BLD AUTO: 9.1 % (ref 0–13.4)
NEUTROPHILS # BLD AUTO: 4.65 K/UL (ref 2–7.15)
NEUTROPHILS NFR BLD: 60.2 % (ref 44–72)
NRBC # BLD AUTO: 0 K/UL
NRBC BLD-RTO: 0 /100 WBC
PLATELET # BLD AUTO: 347 K/UL (ref 164–446)
PMV BLD AUTO: 9.3 FL (ref 9–12.9)
POTASSIUM SERPL-SCNC: 3.8 MMOL/L (ref 3.6–5.5)
PROT SERPL-MCNC: 6 G/DL (ref 6–8.2)
PROTHROMBIN TIME: 13.3 SEC (ref 12–14.6)
RBC # BLD AUTO: 4.47 M/UL (ref 4.2–5.4)
SODIUM SERPL-SCNC: 139 MMOL/L (ref 135–145)
WBC # BLD AUTO: 7.7 K/UL (ref 4.8–10.8)

## 2019-02-13 PROCEDURE — 700102 HCHG RX REV CODE 250 W/ 637 OVERRIDE(OP): Performed by: ANESTHESIOLOGY

## 2019-02-13 PROCEDURE — 700111 HCHG RX REV CODE 636 W/ 250 OVERRIDE (IP)

## 2019-02-13 PROCEDURE — 73502 X-RAY EXAM HIP UNI 2-3 VIEWS: CPT | Mod: LT

## 2019-02-13 PROCEDURE — 96374 THER/PROPH/DIAG INJ IV PUSH: CPT

## 2019-02-13 PROCEDURE — A9270 NON-COVERED ITEM OR SERVICE: HCPCS

## 2019-02-13 PROCEDURE — 72170 X-RAY EXAM OF PELVIS: CPT

## 2019-02-13 PROCEDURE — 700102 HCHG RX REV CODE 250 W/ 637 OVERRIDE(OP): Performed by: STUDENT IN AN ORGANIZED HEALTH CARE EDUCATION/TRAINING PROGRAM

## 2019-02-13 PROCEDURE — 71045 X-RAY EXAM CHEST 1 VIEW: CPT

## 2019-02-13 PROCEDURE — 700101 HCHG RX REV CODE 250

## 2019-02-13 PROCEDURE — A4338 INDWELLING CATHETER LATEX: HCPCS | Performed by: ORTHOPAEDIC SURGERY

## 2019-02-13 PROCEDURE — A9270 NON-COVERED ITEM OR SERVICE: HCPCS | Performed by: HOSPITALIST

## 2019-02-13 PROCEDURE — 85610 PROTHROMBIN TIME: CPT

## 2019-02-13 PROCEDURE — 110372 HCHG SHELL REV 278: Performed by: ORTHOPAEDIC SURGERY

## 2019-02-13 PROCEDURE — 85730 THROMBOPLASTIN TIME PARTIAL: CPT

## 2019-02-13 PROCEDURE — 160031 HCHG SURGERY MINUTES - 1ST 30 MINS LEVEL 5: Performed by: ORTHOPAEDIC SURGERY

## 2019-02-13 PROCEDURE — 160048 HCHG OR STATISTICAL LEVEL 1-5: Performed by: ORTHOPAEDIC SURGERY

## 2019-02-13 PROCEDURE — 85025 COMPLETE CBC W/AUTO DIFF WBC: CPT

## 2019-02-13 PROCEDURE — 770006 HCHG ROOM/CARE - MED/SURG/GYN SEMI*

## 2019-02-13 PROCEDURE — A9270 NON-COVERED ITEM OR SERVICE: HCPCS | Performed by: STUDENT IN AN ORGANIZED HEALTH CARE EDUCATION/TRAINING PROGRAM

## 2019-02-13 PROCEDURE — 160002 HCHG RECOVERY MINUTES (STAT): Performed by: ORTHOPAEDIC SURGERY

## 2019-02-13 PROCEDURE — 700111 HCHG RX REV CODE 636 W/ 250 OVERRIDE (IP): Performed by: EMERGENCY MEDICINE

## 2019-02-13 PROCEDURE — 502578 HCHG PACK, TOTAL HIP: Performed by: ORTHOPAEDIC SURGERY

## 2019-02-13 PROCEDURE — 700102 HCHG RX REV CODE 250 W/ 637 OVERRIDE(OP)

## 2019-02-13 PROCEDURE — 36415 COLL VENOUS BLD VENIPUNCTURE: CPT

## 2019-02-13 PROCEDURE — 700102 HCHG RX REV CODE 250 W/ 637 OVERRIDE(OP): Performed by: HOSPITALIST

## 2019-02-13 PROCEDURE — 502240 HCHG MISC OR SUPPLY RC 0272: Performed by: ORTHOPAEDIC SURGERY

## 2019-02-13 PROCEDURE — 700111 HCHG RX REV CODE 636 W/ 250 OVERRIDE (IP): Performed by: STUDENT IN AN ORGANIZED HEALTH CARE EDUCATION/TRAINING PROGRAM

## 2019-02-13 PROCEDURE — 502000 HCHG MISC OR IMPLANTS RC 0278: Performed by: ORTHOPAEDIC SURGERY

## 2019-02-13 PROCEDURE — 160035 HCHG PACU - 1ST 60 MINS PHASE I: Performed by: ORTHOPAEDIC SURGERY

## 2019-02-13 PROCEDURE — 80053 COMPREHEN METABOLIC PANEL: CPT

## 2019-02-13 PROCEDURE — 160036 HCHG PACU - EA ADDL 30 MINS PHASE I: Performed by: ORTHOPAEDIC SURGERY

## 2019-02-13 PROCEDURE — 700105 HCHG RX REV CODE 258: Performed by: STUDENT IN AN ORGANIZED HEALTH CARE EDUCATION/TRAINING PROGRAM

## 2019-02-13 PROCEDURE — 700101 HCHG RX REV CODE 250: Performed by: STUDENT IN AN ORGANIZED HEALTH CARE EDUCATION/TRAINING PROGRAM

## 2019-02-13 PROCEDURE — 99291 CRITICAL CARE FIRST HOUR: CPT

## 2019-02-13 PROCEDURE — 700112 HCHG RX REV CODE 229: Performed by: STUDENT IN AN ORGANIZED HEALTH CARE EDUCATION/TRAINING PROGRAM

## 2019-02-13 PROCEDURE — 160009 HCHG ANES TIME/MIN: Performed by: ORTHOPAEDIC SURGERY

## 2019-02-13 PROCEDURE — 501838 HCHG SUTURE GENERAL: Performed by: ORTHOPAEDIC SURGERY

## 2019-02-13 PROCEDURE — 0QU70JZ SUPPLEMENT LEFT UPPER FEMUR WITH SYNTHETIC SUBSTITUTE, OPEN APPROACH: ICD-10-PCS | Performed by: ORTHOPAEDIC SURGERY

## 2019-02-13 PROCEDURE — 0SRB06A REPLACEMENT OF LEFT HIP JOINT WITH OXIDIZED ZIRCONIUM ON POLYETHYLENE SYNTHETIC SUBSTITUTE, UNCEMENTED, OPEN APPROACH: ICD-10-PCS | Performed by: ORTHOPAEDIC SURGERY

## 2019-02-13 PROCEDURE — 96375 TX/PRO/DX INJ NEW DRUG ADDON: CPT

## 2019-02-13 PROCEDURE — 160042 HCHG SURGERY MINUTES - EA ADDL 1 MIN LEVEL 5: Performed by: ORTHOPAEDIC SURGERY

## 2019-02-13 PROCEDURE — A9270 NON-COVERED ITEM OR SERVICE: HCPCS | Performed by: ANESTHESIOLOGY

## 2019-02-13 PROCEDURE — 99223 1ST HOSP IP/OBS HIGH 75: CPT | Performed by: HOSPITALIST

## 2019-02-13 DEVICE — STEM POLAR CEMENTLESS STANDARD TI/HA 3 (1EA): Type: IMPLANTABLE DEVICE | Site: HIP | Status: FUNCTIONAL

## 2019-02-13 DEVICE — IMPLANT R3 0 DEG XLPE ACET LNR 32MM X 48MM (1EA): Type: IMPLANTABLE DEVICE | Site: HIP | Status: FUNCTIONAL

## 2019-02-13 DEVICE — IMPLANTABLE DEVICE: Type: IMPLANTABLE DEVICE | Site: HIP | Status: FUNCTIONAL

## 2019-02-13 DEVICE — IMPLANT REF SPHER HEAD SCREW 30MM (1EA): Type: IMPLANTABLE DEVICE | Site: HIP | Status: FUNCTIONAL

## 2019-02-13 DEVICE — HIP DALL MILES SET 2.0 SLEEVE: Type: IMPLANTABLE DEVICE | Site: HIP | Status: FUNCTIONAL

## 2019-02-13 DEVICE — IMPLANT R3 3 HOLE ACET SHELL 48MM (1EA): Type: IMPLANTABLE DEVICE | Site: HIP | Status: FUNCTIONAL

## 2019-02-13 DEVICE — IMPLANT REF SPHER HEAD SCREW 25MM (1EA): Type: IMPLANTABLE DEVICE | Site: HIP | Status: FUNCTIONAL

## 2019-02-13 RX ORDER — GABAPENTIN 300 MG/1
300 CAPSULE ORAL 3 TIMES DAILY
Status: DISCONTINUED | OUTPATIENT
Start: 2019-02-13 | End: 2019-02-15 | Stop reason: HOSPADM

## 2019-02-13 RX ORDER — PROMETHAZINE HYDROCHLORIDE 25 MG/1
12.5-25 SUPPOSITORY RECTAL EVERY 4 HOURS PRN
Status: DISCONTINUED | OUTPATIENT
Start: 2019-02-13 | End: 2019-02-15 | Stop reason: HOSPADM

## 2019-02-13 RX ORDER — MAGNESIUM HYDROXIDE 1200 MG/15ML
LIQUID ORAL
Status: COMPLETED | OUTPATIENT
Start: 2019-02-13 | End: 2019-02-13

## 2019-02-13 RX ORDER — ONDANSETRON 2 MG/ML
4 INJECTION INTRAMUSCULAR; INTRAVENOUS EVERY 4 HOURS PRN
Status: DISCONTINUED | OUTPATIENT
Start: 2019-02-13 | End: 2019-02-15 | Stop reason: HOSPADM

## 2019-02-13 RX ORDER — MEPERIDINE HYDROCHLORIDE 25 MG/ML
12.5 INJECTION INTRAMUSCULAR; INTRAVENOUS; SUBCUTANEOUS
Status: DISCONTINUED | OUTPATIENT
Start: 2019-02-13 | End: 2019-02-13 | Stop reason: HOSPADM

## 2019-02-13 RX ORDER — METHOCARBAMOL 500 MG/1
1000 TABLET, FILM COATED ORAL 4 TIMES DAILY
Status: DISCONTINUED | OUTPATIENT
Start: 2019-02-13 | End: 2019-02-15 | Stop reason: HOSPADM

## 2019-02-13 RX ORDER — AMOXICILLIN 250 MG
2 CAPSULE ORAL 2 TIMES DAILY
Status: DISCONTINUED | OUTPATIENT
Start: 2019-02-13 | End: 2019-02-15 | Stop reason: HOSPADM

## 2019-02-13 RX ORDER — ZOLPIDEM TARTRATE 5 MG/1
5 TABLET ORAL NIGHTLY PRN
Status: DISCONTINUED | OUTPATIENT
Start: 2019-02-14 | End: 2019-02-15 | Stop reason: HOSPADM

## 2019-02-13 RX ORDER — DIPHENHYDRAMINE HYDROCHLORIDE 50 MG/ML
25 INJECTION INTRAMUSCULAR; INTRAVENOUS EVERY 6 HOURS PRN
Status: DISCONTINUED | OUTPATIENT
Start: 2019-02-13 | End: 2019-02-15 | Stop reason: HOSPADM

## 2019-02-13 RX ORDER — ACETAMINOPHEN 325 MG/1
650 TABLET ORAL EVERY 6 HOURS PRN
Status: DISCONTINUED | OUTPATIENT
Start: 2019-02-13 | End: 2019-02-15 | Stop reason: HOSPADM

## 2019-02-13 RX ORDER — PROMETHAZINE HYDROCHLORIDE 25 MG/1
12.5-25 TABLET ORAL EVERY 4 HOURS PRN
Status: DISCONTINUED | OUTPATIENT
Start: 2019-02-13 | End: 2019-02-15 | Stop reason: HOSPADM

## 2019-02-13 RX ORDER — KETOROLAC TROMETHAMINE 30 MG/ML
INJECTION, SOLUTION INTRAMUSCULAR; INTRAVENOUS
Status: DISCONTINUED | OUTPATIENT
Start: 2019-02-13 | End: 2019-02-13 | Stop reason: HOSPADM

## 2019-02-13 RX ORDER — POLYETHYLENE GLYCOL 3350 17 G/17G
1 POWDER, FOR SOLUTION ORAL
Status: DISCONTINUED | OUTPATIENT
Start: 2019-02-13 | End: 2019-02-15 | Stop reason: HOSPADM

## 2019-02-13 RX ORDER — ONDANSETRON 2 MG/ML
4 INJECTION INTRAMUSCULAR; INTRAVENOUS
Status: DISCONTINUED | OUTPATIENT
Start: 2019-02-13 | End: 2019-02-13 | Stop reason: HOSPADM

## 2019-02-13 RX ORDER — OXYCODONE HCL 5 MG/5 ML
SOLUTION, ORAL ORAL
Status: COMPLETED
Start: 2019-02-13 | End: 2019-02-13

## 2019-02-13 RX ORDER — MORPHINE SULFATE 15 MG/1
30 TABLET, FILM COATED, EXTENDED RELEASE ORAL EVERY 12 HOURS
Status: DISCONTINUED | OUTPATIENT
Start: 2019-02-13 | End: 2019-02-15 | Stop reason: HOSPADM

## 2019-02-13 RX ORDER — HYDROMORPHONE HYDROCHLORIDE 1 MG/ML
0.1 INJECTION, SOLUTION INTRAMUSCULAR; INTRAVENOUS; SUBCUTANEOUS
Status: DISCONTINUED | OUTPATIENT
Start: 2019-02-13 | End: 2019-02-13 | Stop reason: HOSPADM

## 2019-02-13 RX ORDER — SODIUM CHLORIDE 9 MG/ML
INJECTION, SOLUTION INTRAVENOUS CONTINUOUS
Status: DISCONTINUED | OUTPATIENT
Start: 2019-02-13 | End: 2019-02-15 | Stop reason: HOSPADM

## 2019-02-13 RX ORDER — BISACODYL 10 MG
10 SUPPOSITORY, RECTAL RECTAL
Status: DISCONTINUED | OUTPATIENT
Start: 2019-02-13 | End: 2019-02-13

## 2019-02-13 RX ORDER — OXYCODONE HCL 5 MG/5 ML
10 SOLUTION, ORAL ORAL
Status: COMPLETED | OUTPATIENT
Start: 2019-02-13 | End: 2019-02-13

## 2019-02-13 RX ORDER — HALOPERIDOL 5 MG/ML
1 INJECTION INTRAMUSCULAR
Status: DISCONTINUED | OUTPATIENT
Start: 2019-02-13 | End: 2019-02-13 | Stop reason: HOSPADM

## 2019-02-13 RX ORDER — ACETAMINOPHEN 500 MG
1000 TABLET ORAL ONCE
Status: COMPLETED | OUTPATIENT
Start: 2019-02-13 | End: 2019-02-13

## 2019-02-13 RX ORDER — ONDANSETRON 2 MG/ML
4 INJECTION INTRAMUSCULAR; INTRAVENOUS EVERY 4 HOURS PRN
Status: DISCONTINUED | OUTPATIENT
Start: 2019-02-13 | End: 2019-02-13

## 2019-02-13 RX ORDER — HALOPERIDOL 5 MG/ML
1 INJECTION INTRAMUSCULAR EVERY 6 HOURS PRN
Status: DISCONTINUED | OUTPATIENT
Start: 2019-02-13 | End: 2019-02-15 | Stop reason: HOSPADM

## 2019-02-13 RX ORDER — AMOXICILLIN 250 MG
1 CAPSULE ORAL NIGHTLY
Status: DISCONTINUED | OUTPATIENT
Start: 2019-02-13 | End: 2019-02-15 | Stop reason: HOSPADM

## 2019-02-13 RX ORDER — POLYETHYLENE GLYCOL 3350 17 G/17G
1 POWDER, FOR SOLUTION ORAL 2 TIMES DAILY PRN
Status: DISCONTINUED | OUTPATIENT
Start: 2019-02-13 | End: 2019-02-13

## 2019-02-13 RX ORDER — SCOLOPAMINE TRANSDERMAL SYSTEM 1 MG/1
1 PATCH, EXTENDED RELEASE TRANSDERMAL
Status: DISCONTINUED | OUTPATIENT
Start: 2019-02-13 | End: 2019-02-15 | Stop reason: HOSPADM

## 2019-02-13 RX ORDER — BUPIVACAINE HYDROCHLORIDE AND EPINEPHRINE 2.5; 5 MG/ML; UG/ML
INJECTION, SOLUTION INFILTRATION; PERINEURAL
Status: DISCONTINUED | OUTPATIENT
Start: 2019-02-13 | End: 2019-02-13 | Stop reason: HOSPADM

## 2019-02-13 RX ORDER — DIPHENHYDRAMINE HCL 25 MG
25 TABLET ORAL EVERY 6 HOURS PRN
Status: DISCONTINUED | OUTPATIENT
Start: 2019-02-13 | End: 2019-02-15 | Stop reason: HOSPADM

## 2019-02-13 RX ORDER — OXYCODONE HCL 5 MG/5 ML
5 SOLUTION, ORAL ORAL
Status: COMPLETED | OUTPATIENT
Start: 2019-02-13 | End: 2019-02-13

## 2019-02-13 RX ORDER — HYDROMORPHONE HYDROCHLORIDE 1 MG/ML
INJECTION, SOLUTION INTRAMUSCULAR; INTRAVENOUS; SUBCUTANEOUS
Status: COMPLETED
Start: 2019-02-13 | End: 2019-02-13

## 2019-02-13 RX ORDER — CELECOXIB 200 MG/1
200 CAPSULE ORAL ONCE
Status: COMPLETED | OUTPATIENT
Start: 2019-02-13 | End: 2019-02-13

## 2019-02-13 RX ORDER — TRAMADOL HYDROCHLORIDE 50 MG/1
50 TABLET ORAL EVERY 4 HOURS PRN
Status: DISCONTINUED | OUTPATIENT
Start: 2019-02-13 | End: 2019-02-15 | Stop reason: HOSPADM

## 2019-02-13 RX ORDER — CHLORPROMAZINE HYDROCHLORIDE 25 MG/ML
25 INJECTION INTRAMUSCULAR EVERY 6 HOURS PRN
Status: DISCONTINUED | OUTPATIENT
Start: 2019-02-13 | End: 2019-02-15 | Stop reason: HOSPADM

## 2019-02-13 RX ORDER — OMEPRAZOLE 20 MG/1
20 CAPSULE, DELAYED RELEASE ORAL DAILY
Status: DISCONTINUED | OUTPATIENT
Start: 2019-02-14 | End: 2019-02-15 | Stop reason: HOSPADM

## 2019-02-13 RX ORDER — HYDROMORPHONE HYDROCHLORIDE 1 MG/ML
0.4 INJECTION, SOLUTION INTRAMUSCULAR; INTRAVENOUS; SUBCUTANEOUS
Status: DISCONTINUED | OUTPATIENT
Start: 2019-02-13 | End: 2019-02-13 | Stop reason: HOSPADM

## 2019-02-13 RX ORDER — DOCUSATE SODIUM 100 MG/1
100 CAPSULE, LIQUID FILLED ORAL 2 TIMES DAILY
Status: DISCONTINUED | OUTPATIENT
Start: 2019-02-13 | End: 2019-02-15 | Stop reason: HOSPADM

## 2019-02-13 RX ORDER — CHLORPROMAZINE HYDROCHLORIDE 10 MG/1
25 TABLET, FILM COATED ORAL EVERY 6 HOURS PRN
Status: DISCONTINUED | OUTPATIENT
Start: 2019-02-13 | End: 2019-02-15 | Stop reason: HOSPADM

## 2019-02-13 RX ORDER — HYDROMORPHONE HYDROCHLORIDE 1 MG/ML
1 INJECTION, SOLUTION INTRAMUSCULAR; INTRAVENOUS; SUBCUTANEOUS ONCE
Status: COMPLETED | OUTPATIENT
Start: 2019-02-13 | End: 2019-02-13

## 2019-02-13 RX ORDER — BISACODYL 10 MG
10 SUPPOSITORY, RECTAL RECTAL
Status: DISCONTINUED | OUTPATIENT
Start: 2019-02-13 | End: 2019-02-15 | Stop reason: HOSPADM

## 2019-02-13 RX ORDER — AMOXICILLIN 500 MG/1
500 CAPSULE ORAL EVERY 8 HOURS
Status: DISCONTINUED | OUTPATIENT
Start: 2019-02-13 | End: 2019-02-13

## 2019-02-13 RX ORDER — HYDROMORPHONE HYDROCHLORIDE 1 MG/ML
0.2 INJECTION, SOLUTION INTRAMUSCULAR; INTRAVENOUS; SUBCUTANEOUS
Status: DISCONTINUED | OUTPATIENT
Start: 2019-02-13 | End: 2019-02-13 | Stop reason: HOSPADM

## 2019-02-13 RX ORDER — VANCOMYCIN HYDROCHLORIDE 500 MG/10ML
INJECTION, POWDER, LYOPHILIZED, FOR SOLUTION INTRAVENOUS
Status: COMPLETED | OUTPATIENT
Start: 2019-02-13 | End: 2019-02-13

## 2019-02-13 RX ORDER — GABAPENTIN 300 MG/1
300 CAPSULE ORAL ONCE
Status: COMPLETED | OUTPATIENT
Start: 2019-02-13 | End: 2019-02-13

## 2019-02-13 RX ORDER — HYDRALAZINE HYDROCHLORIDE 20 MG/ML
5 INJECTION INTRAMUSCULAR; INTRAVENOUS
Status: DISCONTINUED | OUTPATIENT
Start: 2019-02-13 | End: 2019-02-13 | Stop reason: HOSPADM

## 2019-02-13 RX ORDER — KETOROLAC TROMETHAMINE 30 MG/ML
30 INJECTION, SOLUTION INTRAMUSCULAR; INTRAVENOUS EVERY 6 HOURS
Status: COMPLETED | OUTPATIENT
Start: 2019-02-13 | End: 2019-02-14

## 2019-02-13 RX ORDER — CELECOXIB 200 MG/1
200 CAPSULE ORAL 2 TIMES DAILY WITH MEALS
Status: DISCONTINUED | OUTPATIENT
Start: 2019-02-14 | End: 2019-02-15 | Stop reason: HOSPADM

## 2019-02-13 RX ORDER — SODIUM CHLORIDE 9 MG/ML
INJECTION, SOLUTION INTRAVENOUS
Status: ACTIVE
Start: 2019-02-13 | End: 2019-02-14

## 2019-02-13 RX ORDER — AMOXICILLIN 250 MG
1 CAPSULE ORAL
Status: DISCONTINUED | OUTPATIENT
Start: 2019-02-13 | End: 2019-02-15 | Stop reason: HOSPADM

## 2019-02-13 RX ORDER — CEFAZOLIN SODIUM 2 G/100ML
2 INJECTION, SOLUTION INTRAVENOUS EVERY 8 HOURS
Status: COMPLETED | OUTPATIENT
Start: 2019-02-13 | End: 2019-02-14

## 2019-02-13 RX ORDER — OXYCODONE HYDROCHLORIDE 10 MG/1
10 TABLET ORAL EVERY 4 HOURS PRN
Status: DISCONTINUED | OUTPATIENT
Start: 2019-02-13 | End: 2019-02-14

## 2019-02-13 RX ORDER — ONDANSETRON 4 MG/1
4 TABLET, ORALLY DISINTEGRATING ORAL EVERY 4 HOURS PRN
Status: DISCONTINUED | OUTPATIENT
Start: 2019-02-13 | End: 2019-02-15 | Stop reason: HOSPADM

## 2019-02-13 RX ORDER — DEXAMETHASONE SODIUM PHOSPHATE 4 MG/ML
10 INJECTION, SOLUTION INTRA-ARTICULAR; INTRALESIONAL; INTRAMUSCULAR; INTRAVENOUS; SOFT TISSUE ONCE
Status: COMPLETED | OUTPATIENT
Start: 2019-02-14 | End: 2019-02-14

## 2019-02-13 RX ORDER — MIDAZOLAM HYDROCHLORIDE 1 MG/ML
1 INJECTION INTRAMUSCULAR; INTRAVENOUS
Status: DISCONTINUED | OUTPATIENT
Start: 2019-02-13 | End: 2019-02-13 | Stop reason: HOSPADM

## 2019-02-13 RX ORDER — ONDANSETRON 2 MG/ML
4 INJECTION INTRAMUSCULAR; INTRAVENOUS ONCE
Status: COMPLETED | OUTPATIENT
Start: 2019-02-13 | End: 2019-02-13

## 2019-02-13 RX ORDER — DEXAMETHASONE SODIUM PHOSPHATE 4 MG/ML
4 INJECTION, SOLUTION INTRA-ARTICULAR; INTRALESIONAL; INTRAMUSCULAR; INTRAVENOUS; SOFT TISSUE
Status: DISCONTINUED | OUTPATIENT
Start: 2019-02-13 | End: 2019-02-15 | Stop reason: HOSPADM

## 2019-02-13 RX ORDER — ENEMA 19; 7 G/133ML; G/133ML
1 ENEMA RECTAL
Status: DISCONTINUED | OUTPATIENT
Start: 2019-02-13 | End: 2019-02-15 | Stop reason: HOSPADM

## 2019-02-13 RX ORDER — DEXTROSE, SODIUM CHLORIDE, SODIUM LACTATE, POTASSIUM CHLORIDE, AND CALCIUM CHLORIDE 5; .6; .31; .03; .02 G/100ML; G/100ML; G/100ML; G/100ML; G/100ML
INJECTION, SOLUTION INTRAVENOUS CONTINUOUS
Status: DISPENSED | OUTPATIENT
Start: 2019-02-13 | End: 2019-02-14

## 2019-02-13 RX ORDER — OXYCODONE HCL 20 MG/1
20 TABLET, FILM COATED, EXTENDED RELEASE ORAL
Status: COMPLETED | OUTPATIENT
Start: 2019-02-13 | End: 2019-02-13

## 2019-02-13 RX ORDER — ONDANSETRON 4 MG/1
8 TABLET, ORALLY DISINTEGRATING ORAL EVERY 8 HOURS PRN
Status: DISCONTINUED | OUTPATIENT
Start: 2019-02-13 | End: 2019-02-13

## 2019-02-13 RX ORDER — SODIUM CHLORIDE, SODIUM LACTATE, POTASSIUM CHLORIDE, CALCIUM CHLORIDE 600; 310; 30; 20 MG/100ML; MG/100ML; MG/100ML; MG/100ML
INJECTION, SOLUTION INTRAVENOUS CONTINUOUS
Status: DISCONTINUED | OUTPATIENT
Start: 2019-02-13 | End: 2019-02-13 | Stop reason: HOSPADM

## 2019-02-13 RX ORDER — ACETAMINOPHEN 500 MG
1000 TABLET ORAL EVERY 6 HOURS
Status: DISCONTINUED | OUTPATIENT
Start: 2019-02-13 | End: 2019-02-15 | Stop reason: HOSPADM

## 2019-02-13 RX ORDER — DIPHENHYDRAMINE HYDROCHLORIDE 50 MG/ML
12.5 INJECTION INTRAMUSCULAR; INTRAVENOUS
Status: DISCONTINUED | OUTPATIENT
Start: 2019-02-13 | End: 2019-02-13 | Stop reason: HOSPADM

## 2019-02-13 RX ADMIN — HYDROMORPHONE HYDROCHLORIDE 0.2 MG: 1 INJECTION, SOLUTION INTRAMUSCULAR; INTRAVENOUS; SUBCUTANEOUS at 18:05

## 2019-02-13 RX ADMIN — Medication 10 MG: at 17:45

## 2019-02-13 RX ADMIN — SENNOSIDES AND DOCUSATE SODIUM 1 TABLET: 8.6; 5 TABLET ORAL at 20:30

## 2019-02-13 RX ADMIN — HYDROMORPHONE HYDROCHLORIDE 0.4 MG: 1 INJECTION, SOLUTION INTRAMUSCULAR; INTRAVENOUS; SUBCUTANEOUS at 17:55

## 2019-02-13 RX ADMIN — CELECOXIB 200 MG: 200 CAPSULE ORAL at 14:45

## 2019-02-13 RX ADMIN — CEFAZOLIN SODIUM 2 G: 2 INJECTION, SOLUTION INTRAVENOUS at 20:29

## 2019-02-13 RX ADMIN — METHOCARBAMOL 1000 MG: 500 TABLET, FILM COATED ORAL at 20:29

## 2019-02-13 RX ADMIN — ONDANSETRON 4 MG: 2 INJECTION INTRAMUSCULAR; INTRAVENOUS at 11:49

## 2019-02-13 RX ADMIN — SODIUM CHLORIDE, SODIUM LACTATE, POTASSIUM CHLORIDE, CALCIUM CHLORIDE: 600; 310; 30; 20 INJECTION, SOLUTION INTRAVENOUS at 20:32

## 2019-02-13 RX ADMIN — HYDROMORPHONE HYDROCHLORIDE 0.4 MG: 1 INJECTION, SOLUTION INTRAMUSCULAR; INTRAVENOUS; SUBCUTANEOUS at 17:45

## 2019-02-13 RX ADMIN — HYDROMORPHONE HYDROCHLORIDE 1 MG: 1 INJECTION, SOLUTION INTRAMUSCULAR; INTRAVENOUS; SUBCUTANEOUS at 11:49

## 2019-02-13 RX ADMIN — GABAPENTIN 300 MG: 300 CAPSULE ORAL at 20:31

## 2019-02-13 RX ADMIN — SODIUM CHLORIDE, SODIUM LACTATE, POTASSIUM CHLORIDE, CALCIUM CHLORIDE AND DEXTROSE MONOHYDRATE: 5; 600; 310; 30; 20 INJECTION, SOLUTION INTRAVENOUS at 20:47

## 2019-02-13 RX ADMIN — ACETAMINOPHEN 1000 MG: 500 TABLET, FILM COATED ORAL at 14:44

## 2019-02-13 RX ADMIN — FENTANYL CITRATE 50 MCG: 50 INJECTION, SOLUTION INTRAMUSCULAR; INTRAVENOUS at 17:30

## 2019-02-13 RX ADMIN — OXYCODONE HYDROCHLORIDE 10 MG: 5 SOLUTION ORAL at 17:45

## 2019-02-13 RX ADMIN — KETOROLAC TROMETHAMINE 30 MG: 30 INJECTION, SOLUTION INTRAMUSCULAR; INTRAVENOUS at 20:31

## 2019-02-13 RX ADMIN — OXYCODONE HYDROCHLORIDE 20 MG: 20 TABLET, FILM COATED, EXTENDED RELEASE ORAL at 14:51

## 2019-02-13 RX ADMIN — GABAPENTIN 300 MG: 300 CAPSULE ORAL at 14:44

## 2019-02-13 RX ADMIN — OXYCODONE HYDROCHLORIDE 10 MG: 10 TABLET ORAL at 23:09

## 2019-02-13 RX ADMIN — MORPHINE SULFATE 30 MG: 15 TABLET, EXTENDED RELEASE ORAL at 20:30

## 2019-02-13 RX ADMIN — ACETAMINOPHEN 1000 MG: 325 TABLET, FILM COATED ORAL at 20:30

## 2019-02-13 RX ADMIN — TRANEXAMIC ACID 1000 MG: 100 INJECTION, SOLUTION INTRAVENOUS at 18:21

## 2019-02-13 RX ADMIN — DOCUSATE SODIUM 100 MG: 100 CAPSULE, LIQUID FILLED ORAL at 20:29

## 2019-02-13 ASSESSMENT — LIFESTYLE VARIABLES
HAVE YOU EVER FELT YOU SHOULD CUT DOWN ON YOUR DRINKING: NO
HAVE PEOPLE ANNOYED YOU BY CRITICIZING YOUR DRINKING: NO
AVERAGE NUMBER OF DAYS PER WEEK YOU HAVE A DRINK CONTAINING ALCOHOL: 0
TOTAL SCORE: 0
ON A TYPICAL DAY WHEN YOU DRINK ALCOHOL HOW MANY DRINKS DO YOU HAVE: 1
EVER FELT BAD OR GUILTY ABOUT YOUR DRINKING: NO
EVER_SMOKED: NEVER
ALCOHOL_USE: YES
CONSUMPTION TOTAL: NEGATIVE
HOW MANY TIMES IN THE PAST YEAR HAVE YOU HAD 5 OR MORE DRINKS IN A DAY: 0
EVER HAD A DRINK FIRST THING IN THE MORNING TO STEADY YOUR NERVES TO GET RID OF A HANGOVER: NO

## 2019-02-13 NOTE — PROGRESS NOTES
Patient scheduled for elective left VILMA at [facility] today.  She fell in shower and had to be transported to hospital by ambulance with increased left hip pain.  [facility] ER was on divert so she had to be taken to [facility].  The plan is to proceed with the VILMA at [facility] since that is in that patients best interest at this time.    Of note is she has some swelling and warmth in her left calf that looks like stasis dermatitis.  Not to tender and she has been quite immobile lately.  We will proceed with surgery and put her on prophylactic antibiotics.    Patient has multiple orthopaedic issues and a recent neck surgery and would like to have a consult to the [facility] for postop care.

## 2019-02-13 NOTE — PROGRESS NOTES
Last BM 2/11/19  Last oral intake 2230 last night, states she took sips of water with her medications at 0630 today  Pt attempting to use bed pan

## 2019-02-13 NOTE — PROGRESS NOTES
Phone call placed for / [facility] office at [phone] as per pt request.  Spoke with Khalida.  Pt updated

## 2019-02-13 NOTE — OR NURSING
"As requested by patient, left message with \"jerry\" boyfriend regarding patient having surgery.  "

## 2019-02-13 NOTE — DISCHARGE PLANNING
Met with pt briefly who is in process of being transported to pre op.    Pt did say that she lives with her two dogs who are currently being cared for by her boyfriend. Pt stated that she had recently been discharged from [facility] and wanted to return there after surgery. Advised pt that discharge plan will be finalized post op and that PT will work with her and make recommendations.

## 2019-02-13 NOTE — ED TRIAGE NOTES
Pt BIB EMS from home  Chief Complaint   Patient presents with   • Hip Pain     L hip pain, slipped in shower and lowered herself to ground   EMS unable to obtain IV access  Pt given 5 mg Versed IM PTA  Pt states she was supposed to be a direct admit to [facility] and only wants to see Dr Levine  Pt resting on eliseo, pt in no acute distress, pt provided call light, instructed to call if needing any assistance, instructed not to get up by self, lucinarkamaljit in lowest position.

## 2019-02-13 NOTE — TELEPHONE ENCOUNTER
[employer] requiring provider signature for incontinence supplies.    Awaiting provider completion.

## 2019-02-13 NOTE — ED PROVIDER NOTES
"ED Provider Note    CHIEF COMPLAINT  Chief Complaint   Patient presents with   • Hip Pain     L hip pain, slipped in shower and lowered herself to ground       [facility]  Karine Ovalle is a 53 y.o. female who presents for evaluation of acute left hip pain.  The patient was apparently scheduled for a minimally invasive hip arthroplasty later today with Dr. Levine.  She has had chronic hip pain and had her right hip already replaced with him.  She had an apparent slip in the shower and landed on her left hip.  She was unable to bear weight she denies any injury to the head neck chest or upper extremities.  There is some shortening noted on exam.  Apparently she requested to go to [facility] but they were on divert and she was taken here.  She does not take any blood thinners.  Paramedics had difficulty establishing an IV and she was given intramuscular Versed.  No other symptoms such as numbness weakness or tingling    REVIEW OF SYSTEMS  See HPI for further details.  No numbness weakness tingling fevers chills all other systems are negative.     PAST MEDICAL HISTORY  Past Medical History:   Diagnosis Date   • Pain 02/04/2019    arthritic pain   • Pain 12/04/2018    \"ALL OVER\", 10/10   • Anemia    • Arthritis     osteo/hips and back   • At risk for falls    • Chronic back pain    • Dental disorder     lower denture   • Urinary incontinence     using pads       FAMILY HISTORY  No history of bleeding disorder    SOCIAL HISTORY  Social History     Social History   • Marital status: Single     Spouse name: N/A   • Number of children: N/A   • Years of education: N/A     Social History Main Topics   • Smoking status: Never Smoker   • Smokeless tobacco: Never Used   • Alcohol use Yes      Comment: 3-4 per week   • Drug use: No   • Sexual activity: No     Other Topics Concern   • Not on file     Social History Narrative    ** Merged History Encounter **        Single    SURGICAL HISTORY  Past Surgical History:   Procedure " "Laterality Date   • CERVICAL FUSION POSTERIOR  12/7/2018    Procedure: CERVICAL FUSION POSTERIOR- STAGE #2 C3-5 AND C3-T1;  Surgeon: Emre Mendoza III, M.D.;  Location: SURGERY [facility];  Service: Neurosurgery   • CERVICAL LAMINECTOMY POSTERIOR  12/7/2018    Procedure: CERVICAL LAMINECTOMY POSTERIOR C2-T1;  Surgeon: Emre Mendoza III, M.D.;  Location: SURGERY [facility];  Service: Neurosurgery   • CERVICAL DISK AND FUSION ANTERIOR  12/5/2018    Procedure: CERVICAL DISK AND FUSION ANTERIOR-STAGE #1 C4-5;  Surgeon: Emre Mendoza III, M.D.;  Location: SURGERY [facility];  Service: Neurosurgery   • CORPECTOMY  12/5/2018    Procedure: CORPECTOMY;  Surgeon: Emre Mendoza III, M.D.;  Location: SURGERY [facility];  Service: Neurosurgery   • HIP ARTHROPLASTY TOTAL Right 3/20/2018    Procedure: HIP ARTHROPLASTY TOTAL;  Surgeon: Bryon Levine M.D.;  Location: SURGERY [facility];  Service: Orthopedics   • KNEE ARTHROPLASTY TOTAL Right 10/20/2015    Procedure: KNEE ARTHROPLASTY TOTAL;  Surgeon: Bryon Levine M.D.;  Location: SURGERY [facility];  Service:    • APPENDECTOMY LAPAROSCOPIC  3/21/2013    Performed by Dali Queen M.D. at [facility]   • DEBRIDEMENT  5/17/2012    Performed by BRADLEY DYKES at [facility]   • PLASTIC SURGERY  2004    excess skin on arms and legs removed   • MAMMOPLASTY AUGMENTATION Bilateral 2004    breast implants and \"tummy tuck\"   • GASTRIC BYPASS LAPAROSCOPIC  2002    x 2   • ABDOMINAL EXPLORATION         CURRENT MEDICATIONS  Home Medications     Reviewed by Ayanna Brar R.N. (Registered Nurse) on 02/13/19 at 1112  Med List Status: Partial   Medication Last Dose Status   ascorbic acid (VITAMIN C) 500 MG tablet  Active   calcium carbonate (TUMS) 500 MG Chew Tab  Active   cyanocobalamin (VITAMIN B12) 1000 MCG Tab  Active   docusate sodium 100 MG Cap  Active   ferrous sulfate 325 (65 Fe) MG tablet  Active   furosemide (LASIX) 40 " "MG Tab  Active   gabapentin (NEURONTIN) 300 MG Cap  Active   methocarbamol (ROBAXIN) 500 MG Tab 2/13/2019 Active   Misc. Devices Misc  Active   morphine ER (MS CONTIN) 30 MG Tab CR tablet 2/13/2019 Active   Naproxen Sod-Diphenhydramine (ALEVE PM PO)  Active   NARCAN 4 MG/0.1ML Liquid  Active   omeprazole (PRILOSEC) 20 MG delayed-release capsule  Active   oxyCODONE immediate release (ROXICODONE) 10 MG immediate release tablet 2/13/2019 Active   potassium chloride SA (KDUR) 20 MEQ Tab CR  Active   vitamin D 5000 units Tab  Active                ALLERGIES  No Known Allergies    PHYSICAL EXAM  VITAL SIGNS: /57   Pulse 95   Resp 18   Ht 1.6 m (5' 3\")   Wt 89 kg (196 lb 3.4 oz)   LMP  (LMP Unknown)   SpO2 98%   BMI 34.76 kg/m²  Room air O2: 100    Constitutional: Patient appears uncomfortable  HENT: Normocephalic, Atraumatic, Bilateral external ears normal, Oropharynx moist, No oral exudates, Nose normal.   Eyes: PERRLA, EOMI, Conjunctiva normal, No discharge.   Neck: Normal range of motion, No tenderness, Supple, No stridor.   Cardiovascular: Normal heart rate, Normal rhythm, No murmurs, No rubs, No gallops.   Thorax & Lungs: Normal breath sounds, No respiratory distress, No wheezing, No chest tenderness.   Abdomen: Bowel sounds normal, Soft, No tenderness, No masses, No pulsatile masses.   Skin: Warm, Dry, No erythema, No rash.   Back: No tenderness, No CVA tenderness.   Extremities: Intact distal pulses, No edema, pain with range of motion left hip there does appear to be about 2 inches of shortening with internal rotation   neurologic: Alert & oriented x 3, Normal motor function, Normal sensory function, No focal deficits noted.   Psychiatric: Anxious      RADIOLOGY/PROCEDURES  DX-HIP-COMPLETE - UNILATERAL 2+ LEFT   Final Result      Left femoral head osteonecrosis and increasing obstruction.   Marked arthritic changes left hip joint.   No acute fracture.      DX-CHEST-PORTABLE (1 VIEW)   Final Result    "   No pneumothorax.        Results for orders placed or performed during the hospital encounter of 02/13/19   CBC WITH DIFFERENTIAL   Result Value Ref Range    WBC 7.7 4.8 - 10.8 K/uL    RBC 4.47 4.20 - 5.40 M/uL    Hemoglobin 11.9 (L) 12.0 - 16.0 g/dL    Hematocrit 34.9 (L) 37.0 - 47.0 %    MCV 78.1 (L) 81.4 - 97.8 fL    MCH 26.6 (L) 27.0 - 33.0 pg    MCHC 34.1 33.6 - 35.0 g/dL    RDW 48.1 35.9 - 50.0 fL    Platelet Count 347 164 - 446 K/uL    MPV 9.3 9.0 - 12.9 fL    Neutrophils-Polys 60.20 44.00 - 72.00 %    Lymphocytes 25.00 22.00 - 41.00 %    Monocytes 9.10 0.00 - 13.40 %    Eosinophils 4.90 0.00 - 6.90 %    Basophils 0.40 0.00 - 1.80 %    Immature Granulocytes 0.40 0.00 - 0.90 %    Nucleated RBC 0.00 /100 WBC    Neutrophils (Absolute) 4.65 2.00 - 7.15 K/uL    Lymphs (Absolute) 1.93 1.00 - 4.80 K/uL    Monos (Absolute) 0.70 0.00 - 0.85 K/uL    Eos (Absolute) 0.38 0.00 - 0.51 K/uL    Baso (Absolute) 0.03 0.00 - 0.12 K/uL    Immature Granulocytes (abs) 0.03 0.00 - 0.11 K/uL    NRBC (Absolute) 0.00 K/uL   Comp Metabolic Panel   Result Value Ref Range    Sodium 139 135 - 145 mmol/L    Potassium 3.8 3.6 - 5.5 mmol/L    Chloride 103 96 - 112 mmol/L    Co2 29 20 - 33 mmol/L    Anion Gap 7.0 0.0 - 11.9    Glucose 90 65 - 99 mg/dL    Bun 13 8 - 22 mg/dL    Creatinine 0.60 0.50 - 1.40 mg/dL    Calcium 8.7 8.5 - 10.5 mg/dL    AST(SGOT) 15 12 - 45 U/L    ALT(SGPT) 7 2 - 50 U/L    Alkaline Phosphatase 127 (H) 30 - 99 U/L    Total Bilirubin 0.4 0.1 - 1.5 mg/dL    Albumin 3.0 (L) 3.2 - 4.9 g/dL    Total Protein 6.0 6.0 - 8.2 g/dL    Globulin 3.0 1.9 - 3.5 g/dL    A-G Ratio 1.0 g/dL   Prothrombin Time   Result Value Ref Range    PT 13.3 12.0 - 14.6 sec    INR 1.00 0.87 - 1.13   APTT   Result Value Ref Range    APTT 31.3 24.7 - 36.0 sec   ESTIMATED GFR   Result Value Ref Range    GFR If African American >60 >60 mL/min/1.73 m 2    GFR If Non African American >60 >60 mL/min/1.73 m 2         COURSE & MEDICAL DECISION  MAKING  Pertinent Labs & Imaging studies reviewed. (See chart for details)  An IV was established.  The patient was given nausea and pain medication.  Radiograph does not demonstrate any obvious fracture but the acetabulum appears to be possibly disrupted.  Emergent consultation with Dr. Levine was obtained.  He was expecting the patient down at [facility] today for her elective total hip.  Both the patient and here quite frustrated with the situation.  Dr. Levine will try to get her on the schedule at this facility for left total hip.  I expressed my concerns about possible acetabular fracture, orthopedics said not to proceed with CT scan as they will hopefully get her in the operating room later this afternoon.  She will be admitted to the hospitalist service.  She has been kept nothing by mouth    FINAL IMPRESSION  1.  Acute left hip injury, possible acetabular fracture    Admission         Electronically signed by: Bryon Kern, 2/13/2019 11:40 AM

## 2019-02-14 LAB
ANION GAP SERPL CALC-SCNC: 8 MMOL/L (ref 0–11.9)
BUN SERPL-MCNC: 11 MG/DL (ref 8–22)
CALCIUM SERPL-MCNC: 9.2 MG/DL (ref 8.5–10.5)
CHLORIDE SERPL-SCNC: 105 MMOL/L (ref 96–112)
CO2 SERPL-SCNC: 25 MMOL/L (ref 20–33)
CREAT SERPL-MCNC: 0.61 MG/DL (ref 0.5–1.4)
ERYTHROCYTE [DISTWIDTH] IN BLOOD BY AUTOMATED COUNT: 48.2 FL (ref 35.9–50)
GLUCOSE SERPL-MCNC: 121 MG/DL (ref 65–99)
HCT VFR BLD AUTO: 32.5 % (ref 37–47)
HGB BLD-MCNC: 11.2 G/DL (ref 12–16)
MCH RBC QN AUTO: 27.1 PG (ref 27–33)
MCHC RBC AUTO-ENTMCNC: 34.5 G/DL (ref 33.6–35)
MCV RBC AUTO: 78.5 FL (ref 81.4–97.8)
PLATELET # BLD AUTO: 363 K/UL (ref 164–446)
PMV BLD AUTO: 10.4 FL (ref 9–12.9)
POTASSIUM SERPL-SCNC: 4.2 MMOL/L (ref 3.6–5.5)
RBC # BLD AUTO: 4.14 M/UL (ref 4.2–5.4)
SODIUM SERPL-SCNC: 138 MMOL/L (ref 135–145)
WBC # BLD AUTO: 14.2 K/UL (ref 4.8–10.8)

## 2019-02-14 PROCEDURE — 97535 SELF CARE MNGMENT TRAINING: CPT

## 2019-02-14 PROCEDURE — 700102 HCHG RX REV CODE 250 W/ 637 OVERRIDE(OP): Performed by: HOSPITALIST

## 2019-02-14 PROCEDURE — 700111 HCHG RX REV CODE 636 W/ 250 OVERRIDE (IP): Performed by: STUDENT IN AN ORGANIZED HEALTH CARE EDUCATION/TRAINING PROGRAM

## 2019-02-14 PROCEDURE — 99232 SBSQ HOSP IP/OBS MODERATE 35: CPT | Performed by: HOSPITALIST

## 2019-02-14 PROCEDURE — 700112 HCHG RX REV CODE 229: Performed by: STUDENT IN AN ORGANIZED HEALTH CARE EDUCATION/TRAINING PROGRAM

## 2019-02-14 PROCEDURE — 85027 COMPLETE CBC AUTOMATED: CPT

## 2019-02-14 PROCEDURE — 97166 OT EVAL MOD COMPLEX 45 MIN: CPT

## 2019-02-14 PROCEDURE — 36415 COLL VENOUS BLD VENIPUNCTURE: CPT

## 2019-02-14 PROCEDURE — 700102 HCHG RX REV CODE 250 W/ 637 OVERRIDE(OP): Performed by: STUDENT IN AN ORGANIZED HEALTH CARE EDUCATION/TRAINING PROGRAM

## 2019-02-14 PROCEDURE — 97162 PT EVAL MOD COMPLEX 30 MIN: CPT

## 2019-02-14 PROCEDURE — 80048 BASIC METABOLIC PNL TOTAL CA: CPT

## 2019-02-14 PROCEDURE — A9270 NON-COVERED ITEM OR SERVICE: HCPCS | Performed by: STUDENT IN AN ORGANIZED HEALTH CARE EDUCATION/TRAINING PROGRAM

## 2019-02-14 PROCEDURE — A9270 NON-COVERED ITEM OR SERVICE: HCPCS | Performed by: HOSPITALIST

## 2019-02-14 PROCEDURE — 770006 HCHG ROOM/CARE - MED/SURG/GYN SEMI*

## 2019-02-14 PROCEDURE — 700105 HCHG RX REV CODE 258: Performed by: HOSPITALIST

## 2019-02-14 RX ORDER — OXYCODONE HYDROCHLORIDE 10 MG/1
10 TABLET ORAL
Status: DISCONTINUED | OUTPATIENT
Start: 2019-02-14 | End: 2019-02-15 | Stop reason: HOSPADM

## 2019-02-14 RX ADMIN — KETOROLAC TROMETHAMINE 30 MG: 30 INJECTION, SOLUTION INTRAMUSCULAR; INTRAVENOUS at 15:10

## 2019-02-14 RX ADMIN — TRAMADOL HYDROCHLORIDE 50 MG: 50 TABLET, COATED ORAL at 12:11

## 2019-02-14 RX ADMIN — MORPHINE SULFATE 30 MG: 15 TABLET, EXTENDED RELEASE ORAL at 05:22

## 2019-02-14 RX ADMIN — METHOCARBAMOL 1000 MG: 500 TABLET, FILM COATED ORAL at 12:11

## 2019-02-14 RX ADMIN — KETOROLAC TROMETHAMINE 30 MG: 30 INJECTION, SOLUTION INTRAMUSCULAR; INTRAVENOUS at 02:43

## 2019-02-14 RX ADMIN — ASPIRIN 81 MG: 81 TABLET, COATED ORAL at 05:22

## 2019-02-14 RX ADMIN — OMEPRAZOLE 20 MG: 20 CAPSULE, DELAYED RELEASE ORAL at 05:22

## 2019-02-14 RX ADMIN — DOCUSATE SODIUM 100 MG: 100 CAPSULE, LIQUID FILLED ORAL at 05:22

## 2019-02-14 RX ADMIN — ACETAMINOPHEN 1000 MG: 325 TABLET, FILM COATED ORAL at 08:33

## 2019-02-14 RX ADMIN — GABAPENTIN 300 MG: 300 CAPSULE ORAL at 05:22

## 2019-02-14 RX ADMIN — ACETAMINOPHEN 1000 MG: 325 TABLET, FILM COATED ORAL at 20:08

## 2019-02-14 RX ADMIN — ASPIRIN 81 MG: 81 TABLET, COATED ORAL at 16:46

## 2019-02-14 RX ADMIN — KETOROLAC TROMETHAMINE 30 MG: 30 INJECTION, SOLUTION INTRAMUSCULAR; INTRAVENOUS at 08:33

## 2019-02-14 RX ADMIN — SENNOSIDES AND DOCUSATE SODIUM 2 TABLET: 8.6; 5 TABLET ORAL at 05:22

## 2019-02-14 RX ADMIN — OXYCODONE HYDROCHLORIDE 10 MG: 10 TABLET ORAL at 20:09

## 2019-02-14 RX ADMIN — CEFAZOLIN SODIUM 2 G: 2 INJECTION, SOLUTION INTRAVENOUS at 03:15

## 2019-02-14 RX ADMIN — OXYCODONE HYDROCHLORIDE 10 MG: 10 TABLET ORAL at 08:33

## 2019-02-14 RX ADMIN — SENNOSIDES AND DOCUSATE SODIUM 2 TABLET: 8.6; 5 TABLET ORAL at 16:46

## 2019-02-14 RX ADMIN — GABAPENTIN 300 MG: 300 CAPSULE ORAL at 16:46

## 2019-02-14 RX ADMIN — SODIUM CHLORIDE: 9 INJECTION, SOLUTION INTRAVENOUS at 02:43

## 2019-02-14 RX ADMIN — ACETAMINOPHEN 1000 MG: 325 TABLET, FILM COATED ORAL at 15:10

## 2019-02-14 RX ADMIN — METHOCARBAMOL 1000 MG: 500 TABLET, FILM COATED ORAL at 16:46

## 2019-02-14 RX ADMIN — CELECOXIB 200 MG: 200 CAPSULE ORAL at 20:13

## 2019-02-14 RX ADMIN — METHOCARBAMOL 1000 MG: 500 TABLET, FILM COATED ORAL at 20:08

## 2019-02-14 RX ADMIN — SENNOSIDES AND DOCUSATE SODIUM 1 TABLET: 8.6; 5 TABLET ORAL at 20:09

## 2019-02-14 RX ADMIN — DEXAMETHASONE SODIUM PHOSPHATE 10 MG: 4 INJECTION, SOLUTION INTRAMUSCULAR; INTRAVENOUS at 07:00

## 2019-02-14 RX ADMIN — ACETAMINOPHEN 1000 MG: 325 TABLET, FILM COATED ORAL at 02:43

## 2019-02-14 RX ADMIN — METHOCARBAMOL 1000 MG: 500 TABLET, FILM COATED ORAL at 08:33

## 2019-02-14 RX ADMIN — GABAPENTIN 300 MG: 300 CAPSULE ORAL at 12:11

## 2019-02-14 RX ADMIN — SODIUM CHLORIDE: 9 INJECTION, SOLUTION INTRAVENOUS at 12:19

## 2019-02-14 RX ADMIN — OXYCODONE HYDROCHLORIDE 10 MG: 10 TABLET ORAL at 03:40

## 2019-02-14 RX ADMIN — DOCUSATE SODIUM 100 MG: 100 CAPSULE, LIQUID FILLED ORAL at 16:47

## 2019-02-14 RX ADMIN — MORPHINE SULFATE 30 MG: 15 TABLET, EXTENDED RELEASE ORAL at 16:47

## 2019-02-14 ASSESSMENT — COGNITIVE AND FUNCTIONAL STATUS - GENERAL
DAILY ACTIVITIY SCORE: 14
PERSONAL GROOMING: A LITTLE
TOILETING: A LOT
TOILETING: A LOT
EATING MEALS: A LITTLE
CLIMB 3 TO 5 STEPS WITH RAILING: TOTAL
MOVING TO AND FROM BED TO CHAIR: UNABLE
SUGGESTED CMS G CODE MODIFIER MOBILITY: CM
SUGGESTED CMS G CODE MODIFIER DAILY ACTIVITY: CK
TURNING FROM BACK TO SIDE WHILE IN FLAT BAD: A LITTLE
MOBILITY SCORE: 9
PERSONAL GROOMING: A LITTLE
MOVING FROM LYING ON BACK TO SITTING ON SIDE OF FLAT BED: UNABLE
SUGGESTED CMS G CODE MODIFIER DAILY ACTIVITY: CK
MOVING FROM LYING ON BACK TO SITTING ON SIDE OF FLAT BED: UNABLE
SUGGESTED CMS G CODE MODIFIER MOBILITY: CM
HELP NEEDED FOR BATHING: A LOT
STANDING UP FROM CHAIR USING ARMS: A LOT
CLIMB 3 TO 5 STEPS WITH RAILING: TOTAL
WALKING IN HOSPITAL ROOM: TOTAL
TURNING FROM BACK TO SIDE WHILE IN FLAT BAD: A LITTLE
DRESSING REGULAR LOWER BODY CLOTHING: A LOT
WALKING IN HOSPITAL ROOM: TOTAL
DRESSING REGULAR LOWER BODY CLOTHING: A LOT
STANDING UP FROM CHAIR USING ARMS: TOTAL
MOVING TO AND FROM BED TO CHAIR: UNABLE
MOBILITY SCORE: 8
DRESSING REGULAR UPPER BODY CLOTHING: A LOT
DRESSING REGULAR UPPER BODY CLOTHING: A LOT
HELP NEEDED FOR BATHING: A LOT
DAILY ACTIVITIY SCORE: 15

## 2019-02-14 ASSESSMENT — ENCOUNTER SYMPTOMS
DIARRHEA: 0
PALPITATIONS: 0
WHEEZING: 0
BACK PAIN: 1
SPEECH CHANGE: 0
NAUSEA: 0
WEAKNESS: 0
BLURRED VISION: 0
SHORTNESS OF BREATH: 0
CONSTIPATION: 0
VOMITING: 0
NECK PAIN: 1
NERVOUS/ANXIOUS: 0
LOSS OF CONSCIOUSNESS: 0
HEADACHES: 0
FEVER: 0
STRIDOR: 0
ABDOMINAL PAIN: 0
DIZZINESS: 0
CHILLS: 0
COUGH: 0

## 2019-02-14 ASSESSMENT — COPD QUESTIONNAIRES
DO YOU EVER COUGH UP ANY MUCUS OR PHLEGM?: NO/ONLY WITH OCCASIONAL COLDS OR INFECTIONS
COPD SCREENING SCORE: 1
HAVE YOU SMOKED AT LEAST 100 CIGARETTES IN YOUR ENTIRE LIFE: NO/DON'T KNOW
DURING THE PAST 4 WEEKS HOW MUCH DID YOU FEEL SHORT OF BREATH: NONE/LITTLE OF THE TIME

## 2019-02-14 ASSESSMENT — LIFESTYLE VARIABLES: EVER_SMOKED: NEVER

## 2019-02-14 ASSESSMENT — GAIT ASSESSMENTS
ASSISTIVE DEVICE: FRONT WHEEL WALKER
GAIT LEVEL OF ASSIST: UNABLE TO PARTICIPATE

## 2019-02-14 ASSESSMENT — ACTIVITIES OF DAILY LIVING (ADL): TOILETING: REQUIRES ASSIST

## 2019-02-14 NOTE — ASSESSMENT & PLAN NOTE
Wound care  Pain management  S/p left hip replacement by Dr Levine 2/13/19  PT/OT  Patient hopeful for Rehab  DVT prophylaxis  Initial Ice pack to left hip area  Monitor CBC

## 2019-02-14 NOTE — ASSESSMENT & PLAN NOTE
May need outpatient monitoring of fat soluable vitamins  Watch for liver disease  Several vitamins pending

## 2019-02-14 NOTE — ASSESSMENT & PLAN NOTE
Wound care  Pain management  S/p left hip replacement by Dr Levine 2/13/19  DVT prophylaxis  PT/OT  Patient hopeful for Rehab

## 2019-02-14 NOTE — H&P
Hospital Medicine History & Physical Note    Date of Service  2/13/2019  Note signed:  2/14/2019    Primary Care Physician  Micky Rubin M.D.    Consultants  Orthopedic, Dr Levine    Code Status  FULL    Chief Complaint  Fell today in shower and was suppose to have hip surgery.  Called paramedics    History of Presenting Illness  53 y.o. female with chronic left hip pain and was getting ready for surgery today with Dr Levine but fell/slipped in her shower requiring paramedics bring her to the hospital. She was found to have question of acetabular fracture per ED physician discussion with me.  Dr Levine was at [facility] but patient was transferred to [facility].  The patient is alert and oriented with clear speech. She denies any syncope or LOC in her shower.    Review of Systems  Review of Systems   Constitutional: Negative for fever.   Eyes: Negative for blurred vision.   Respiratory: Negative for shortness of breath and stridor.    Cardiovascular: Positive for leg swelling. Negative for chest pain and palpitations.   Gastrointestinal: Negative for abdominal pain and nausea.   Genitourinary: Negative for dysuria.   Musculoskeletal: Positive for back pain and joint pain.   Neurological: Negative for dizziness, speech change, loss of consciousness and weakness.   Psychiatric/Behavioral: The patient is not nervous/anxious.        Past Medical History   has a past medical history of Anemia; Arthritis; At risk for falls; Chronic back pain; Dental disorder; Pain (12/04/2018); Pain (02/04/2019); and Urinary incontinence. She also has no past medical history of ASTHMA; Breast cancer (HCC); or Diabetes.    Surgical History   has a past surgical history that includes gastric bypass laparoscopic (2002); abdominal exploration; plastic surgery (2004); debridement (5/17/2012); appendectomy laparoscopic (3/21/2013); knee arthroplasty total (Right, 10/20/2015); mammoplasty augmentation (Bilateral, 2004); hip  arthroplasty total (Right, 3/20/2018); cervical disk and fusion anterior (12/5/2018); corpectomy (12/5/2018); cervical fusion posterior (12/7/2018); cervical laminectomy posterior (12/7/2018); and hip arthroplasty total (Left, 2/13/2019).     Family History  family history includes Diabetes in her mother; Heart Disease in her mother.     Social History   reports that she has never smoked. She has never used smokeless tobacco. She reports that she drinks alcohol. She reports that she does not use drugs.    Allergies  No Known Allergies    Medications  Prior to Admission Medications   Prescriptions Last Dose Informant Patient Reported? Taking?   Misc. Devices Misc   No No   Sig: Adult briefs XXL (FEMALE) FOR URINARY INCONTINENCE- ANY BRAND PER PT PREFERENCE OR INSURANC ALLOWANCE; TO CHANGE QID   NARCAN 4 MG/0.1ML Liquid  at unknown  Yes No   Naproxen Sod-Diphenhydramine (ALEVE PM PO) 2/11/2019 at 2200  Yes No   Sig: Take  by mouth as needed.   ascorbic acid (VITAMIN C) 500 MG tablet 1/30/2019 at unknown  No No   Sig: Take 1 Tab by mouth every morning with breakfast.   calcium carbonate (TUMS) 500 MG Chew Tab 2/11/2019 at 0900  No No   Sig: Take 1 Tab by mouth 2 Times a Day.   cyanocobalamin (VITAMIN B12) 1000 MCG Tab 1/30/2019 at unknown Patient No No   Sig: Take 1 Tab by mouth every day.   docusate sodium 100 MG Cap 2/12/2019 at 2200 Patient No No   Sig: Take 100 mg by mouth 2 Times a Day.   Patient not taking: Reported on 1/5/2019   ferrous sulfate 325 (65 Fe) MG tablet 2/6/2019 at 1600  No No   Sig: Take 1 Tab by mouth every morning with breakfast.   furosemide (LASIX) 40 MG Tab 2/12/2019 at 1400  No No   Sig: Take 1 Tab by mouth every day.   gabapentin (NEURONTIN) 300 MG Cap 2/13/2019 at 2000  No No   Sig: Take 1 Cap by mouth 3 times a day.   methocarbamol (ROBAXIN) 500 MG Tab 2/13/2019 at Unknown time  No No   Sig: Take 2 Tabs by mouth 4 times a day.   morphine ER (MS CONTIN) 30 MG Tab CR tablet 2/13/2019 at  Unknown time  Yes No   Sig: Take 30 mg by mouth every 12 hours.   omeprazole (PRILOSEC) 20 MG delayed-release capsule 2/12/2019 at 2200  No No   Sig: Take 1 Cap by mouth every day.   oxyCODONE immediate release (ROXICODONE) 10 MG immediate release tablet 2/13/2019 at Unknown time  Yes No   Sig: Take 10 mg by mouth every four hours as needed for Severe Pain.   potassium chloride SA (KDUR) 20 MEQ Tab CR 2/12/2019 at 1400 Patient No No   Sig: Take 1 Tab by mouth every day.   vitamin D 5000 units Tab 1/30/2019 at unknown  No No   Sig: Take 5,000 Units by mouth every day.      Facility-Administered Medications: None       Physical Exam  Temp:  [36.3 °C (97.4 °F)-37.2 °C (98.9 °F)] 36.4 °C (97.5 °F)  Pulse:  [] 105  Resp:  [15-20] 17  BP: (123-128)/(57-80) 128/77  SpO2:  [96 %-100 %] 100 %    Physical Exam   Constitutional: She is oriented to person, place, and time. She appears well-developed and well-nourished. No distress.   HENT:   Head: Normocephalic and atraumatic.   Nose: Nose normal.   Mouth/Throat: Oropharynx is clear and moist.   Eyes: Conjunctivae and EOM are normal. Right eye exhibits no discharge. Left eye exhibits no discharge. No scleral icterus.   Neck: Normal range of motion. No tracheal deviation present.   Cardiovascular: Normal rate, regular rhythm and normal heart sounds.    No murmur heard.  Pulses:       Radial pulses are 2+ on the right side, and 2+ on the left side.        Dorsalis pedis pulses are 2+ on the right side, and 2+ on the left side.   Pulmonary/Chest: Effort normal and breath sounds normal. No respiratory distress. She has no wheezes.   Abdominal: Soft. Bowel sounds are normal. She exhibits no distension. There is no tenderness.   Musculoskeletal: She exhibits edema.   Neurological: She is alert and oriented to person, place, and time. No cranial nerve deficit.   Skin: Skin is warm. She is not diaphoretic.   Psychiatric: She has a normal mood and affect. Her behavior is normal.  Thought content normal.   Vitals reviewed.      Laboratory:  Recent Labs      02/13/19   1144   WBC  7.7   RBC  4.47   HEMOGLOBIN  11.9*   HEMATOCRIT  34.9*   MCV  78.1*   MCH  26.6*   MCHC  34.1   RDW  48.1   PLATELETCT  347   MPV  9.3     Recent Labs      02/13/19   1144   SODIUM  139   POTASSIUM  3.8   CHLORIDE  103   CO2  29   GLUCOSE  90   BUN  13   CREATININE  0.60   CALCIUM  8.7     Recent Labs      02/13/19   1144   ALTSGPT  7   ASTSGOT  15   ALKPHOSPHAT  127*   TBILIRUBIN  0.4   GLUCOSE  90     Recent Labs      02/13/19   1144   APTT  31.3   INR  1.00             No results for input(s): TROPONINI in the last 72 hours.    Urinalysis:    No results found     Imaging:  DX-PELVIS-1 OR 2 VIEWS   Final Result      Interval left hip arthroplasty when compared to previous exam same day. Air in the soft tissues consistent with history of recent surgery.      DX-HIP-COMPLETE - UNILATERAL 2+ LEFT   Final Result      Left femoral head osteonecrosis and increasing obstruction.   Marked arthritic changes left hip joint.   No acute fracture.      DX-CHEST-PORTABLE (1 VIEW)   Final Result      No pneumothorax.            Assessment/Plan:  I anticipate this patient will require at least two midnights for appropriate medical management, necessitating inpatient admission.    Status post left hip replacement   Assessment & Plan    Wound care  Pain management  S/p left hip replacement by Dr Levine 2/13/19  PT/OT  Patient hopeful for Rehab  DVT prophylaxis  Initial Ice pack to left hip area  Monitor CBC     Obesity (BMI 30-39.9)- (present on admission)   Assessment & Plan    Body mass index is 34.76 kg/m².  Neds discussion on dietary changes     Primary osteoarthritis of left hip- dr levine; left THR tbd feb 6, 2019- (present on admission)   Assessment & Plan    Wound care  Pain management  S/p left hip replacement by Dr Levine 2/13/19  PT/OT  Patient hopeful for Rehab  DVT prophylaxis  Initial Ice pack to left hip  area  Monitor CBC     Essential hypertension- (present on admission)   Assessment & Plan    Monitor vitals     Uncomplicated opioid dependence (CMS-HCC)- kylah adkins- (present on admission)   Assessment & Plan    Pain management for recent left hip surgery     History of gastric bypass- (present on admission)   Assessment & Plan    May need outpatient monitoring of fat soluable vitamins  Watch for liver disease         VTE prophylaxis: SCDs

## 2019-02-14 NOTE — OR SURGEON
Immediate Post OP Note    PreOp Diagnosis: Severe DJD L hip with acute femoral head collapse    PostOp Diagnosis: same with intraoperative calcar fracture    Procedure(s):  Left HIP ARTHROPLASTY TOTAL, Cabling of intra-operative calcar fracture - Wound Class: Clean    Surgeon(s):  WARREN Bojorquez M.D.    Anesthesiologist/Type of Anesthesia:  Anesthesiologist: Elliot Torres M.D./General    Surgical Staff:  Circulator: Dawn Christopher R.N.  Limb Rosado: Rita Carter  Scrub Person: Marcelo Medina    Specimens removed if any:  * No specimens in log *    Estimated Blood Loss: 250    Findings: Severe DJD; femoral head collapse; hemarthrosis    Complications: calcar fracture        2/13/2019 5:12 PM Bryon Levine M.D.

## 2019-02-14 NOTE — CARE PLAN
Problem: Communication  Goal: The ability to communicate needs accurately and effectively will improve    Intervention: Circle patient and significant other/support system to call light to alert staff of needs  Patient oriented to room, bed control and call button use. Utilize bed alarm, bedside table within reach.       Problem: Venous Thromboembolism (VTW)/Deep Vein Thrombosis (DVT) Prevention:  Goal: Patient will participate in Venous Thrombosis (VTE)/Deep Vein Thrombosis (DVT)Prevention Measures    Intervention: Ensure patient wears graduated elastic stockings (SHONNA hose) and/or SCDs, if ordered, when in bed or chair (Remove at least once per shift for skin check)  SCD in use to BLE.       Problem: Pain Management  Goal: Pain level will decrease to patient's comfort goal    Intervention: Educate and implement non-pharmacologic comfort measures. Examples: relaxation, distration, play therapy, activity therapy, massage, etc.  PRN Oxycodone given per MAR for pain to left hip; Routine Toradol and Acetaminophen given with + effect.

## 2019-02-14 NOTE — PROGRESS NOTES
Hospital Medicine Daily Progress Note    Date of Service  2/14/2019    Chief Complaint  53 y.o. female admitted 2/13/2019 with severe DJD L hip with acute femoral head collapse    Interval Problem Update  2/14: Amoxicillin DC'd.  Rehab looking for therapy notes    Disposition  Rehab looking for therapy notes    Review of Systems  Review of Systems   Constitutional: Negative for chills and fever.   Respiratory: Negative for cough, shortness of breath and wheezing.    Cardiovascular: Negative for chest pain.   Gastrointestinal: Negative for constipation, diarrhea, nausea and vomiting.   Genitourinary: Negative for dysuria.   Musculoskeletal: Positive for joint pain and neck pain.   Neurological: Negative for headaches.        Physical Exam  Temp:  [36.3 °C (97.4 °F)-37.2 °C (98.9 °F)] 37.1 °C (98.7 °F)  Pulse:  [] 104  Resp:  [15-20] 16  BP: (104-136)/(64-84) 136/84  SpO2:  [94 %-100 %] 94 %    Physical Exam   Constitutional: She appears well-developed.   HENT:   Head: Normocephalic.   Eyes: Conjunctivae are normal.   Cardiovascular: Normal rate.  Exam reveals no gallop.    Pulmonary/Chest: No respiratory distress. She has no wheezes.   Abdominal: She exhibits no distension. There is no tenderness.   Musculoskeletal: She exhibits tenderness.   Neurological: She is alert.       Fluids    Intake/Output Summary (Last 24 hours) at 02/14/19 1305  Last data filed at 02/13/19 2104   Gross per 24 hour   Intake             1910 ml   Output              150 ml   Net             1760 ml       Laboratory  Recent Labs      02/13/19   1144  02/14/19   0610   WBC  7.7  14.2*   RBC  4.47  4.14*   HEMOGLOBIN  11.9*  11.2*   HEMATOCRIT  34.9*  32.5*   MCV  78.1*  78.5*   MCH  26.6*  27.1   MCHC  34.1  34.5   RDW  48.1  48.2   PLATELETCT  347  363   MPV  9.3  10.4     Recent Labs      02/13/19   1144  02/14/19   0610   SODIUM  139  138   POTASSIUM  3.8  4.2   CHLORIDE  103  105   CO2  29  25   GLUCOSE  90  121*   BUN  13  11    CREATININE  0.60  0.61   CALCIUM  8.7  9.2     Recent Labs      02/13/19   1144   APTT  31.3   INR  1.00               Imaging  DX-PELVIS-1 OR 2 VIEWS   Final Result      Interval left hip arthroplasty when compared to previous exam same day. Air in the soft tissues consistent with history of recent surgery.      DX-HIP-COMPLETE - UNILATERAL 2+ LEFT   Final Result      Left femoral head osteonecrosis and increasing obstruction.   Marked arthritic changes left hip joint.   No acute fracture.      DX-CHEST-PORTABLE (1 VIEW)   Final Result      No pneumothorax.           Assessment/Plan  Status post left hip replacement   Assessment & Plan    Wound care  Pain management  S/p left hip replacement by Dr Levine 2/13/19  PT/OT  Patient hopeful for Rehab  DVT prophylaxis  Initial Ice pack to left hip area  Monitor CBC     Obesity (BMI 30-39.9)- (present on admission)   Assessment & Plan    Body mass index is 34.76 kg/m².  Would benefit from weight loss after recovery     Primary osteoarthritis of left hip- dr levine; left THR tbd feb 6, 2019- (present on admission)   Assessment & Plan    Wound care  Pain management  S/p left hip replacement by Dr Levine 2/13/19  DVT prophylaxis  PT/OT  Patient hopeful for Rehab       Essential hypertension- (present on admission)   Assessment & Plan    Monitor vitals     Uncomplicated opioid dependence (CMS-HCC)- kylah adkins- (present on admission)   Assessment & Plan    Pain management for recent left hip surgery     History of gastric bypass- (present on admission)   Assessment & Plan    May need outpatient monitoring of fat soluable vitamins  Watch for liver disease  Several vitamins pending          VTE prophylaxis: scds

## 2019-02-14 NOTE — PROGRESS NOTES
Lisetteiensusan Thomson updated via his cell phone. PACU RN will alert him when room is assigned.

## 2019-02-14 NOTE — OP REPORT
DATE OF SERVICE:  02/13/2019    PREOPERATIVE DIAGNOSES:  1.  Severe degenerative arthritis, left hip.  2.  Acute femoral head collapse.    POSTOPERATIVE DIAGNOSES:  1.  Severe degenerative arthritis, left hip.  2.  Acute femoral head collapse.  3.  Intraoperative calcar crack.    PROCEDURE:  Left total hip arthroplasty with cabling of calcar.    SURGEON:  Bryon Levine MD    ANESTHESIA:  General.    ANESTHESIOLOGIST:  Elliot Torres MD    ASSISTANT:  Carlito Huber MD    ESTIMATED BLOOD LOSS:  250 mL.    COMPONENTS PLACED:  Smith and Nephew size 50 R3 acetabulum with neutral 32 mm   polyethylene liner, Smith and Nephew size 3 standard offset polar stem with a   32 mm +8 head and 2 Geneva cables in the intertrochanteric region.    FINDINGS:  Severe osteoarthritis, collapse of femoral head, hemarthrosis.    COMPLICATIONS:  Calcar fracture.    SUMMARY:  Patient was brought to the operating room and anesthesia was   administered.  Antibiotics and tranexamic acid were given IV.  Leg lengths   were measured.  She was at least a centimeter short on the left.  She was   placed in the right lateral decubitus position on a well-padded pegboard with   an axillary roll placed.  The left hip and leg prepped and draped in usual   sterile manner.  Leg lengths were measured again in that position and a   timeout was called.    We made an anterolateral incision starting just lateral to the ASIS and   extending out to the front of the trochanter.  Dissection was carried sharply   through skin and subcutaneous tissue.  Fascia opened in line with the   incision.  We did identify the gluteus medius and tensor fascia interval was   pretty scarred down and muscles were quite atrophied, but we were able to   bluntly identify and stop the bleeders and mobilize the superior gluteal nerve   branch proximally.  The capsule was identified.  The margins dissected and a   capsulotomy was made.  There was a large hemarthrosis from the  acute injury.    The subcapital cut was made.  We delivered the proximal femur in the wound and   then made a secondary cut starting at the shoulder.  We had released the   capsule and external rotators and had good access to the femur.  We broached   up to a size 3, which sat just above the osteotomy site, but had good fit and   fill.  No evidence of any crack at that point.    The acetabulum was exposed.  Femoral head excised along with the labrum.  We   reamed down to the inner table and outward until we had good hemispherical   bleeding bone with a size 50.  We did superiorize the cup a little bit for a   better bony contact and fit.  The real 50 was placed.  We had a good pressfit,   2 superior screws with excellent purchase.  No significant osteophytes   removed and the liner was locked into position.    We then went back and trialed with a size 3 body.  With the +4 and +8, we had   good stability.  The real 3 was placed and we were just very gently tapping   the real implant down and noticed a crack in the calcar.  We went back, cabled   the calcar with 2 cables, 1 above and 1 below the lesser trochanter, which   reduced the fracture and allowed us to get a decent pressfit with the same   implant, which of course we tapped very gingerly, but we did still have a good   fit and fill.  We will just plan to keep her touchdown weightbearing.    The final reduction was done with the +8 head, which gave us the most stable   construct and gained some leg length for her.  The wound was soaked with   dilute Betadine for a few minutes and then flushed with saline.  We closed the   capsule with #1 Vicryl.  The fascia was closed with #1 Vicryl.  We placed   powdered vancomycin in the skin and closed the skin with layers of Vicryl and   a running 3-0 Monocryl.  We did inject the region with a solution of local   anesthetic both intraarticularly and then the fascia and capsule.  The patient   was stable during the procedure  and went to recovery room in good condition.       ____________________________________     MD DK LUX / RICHARD    DD:  02/13/2019 17:19:34  DT:  02/13/2019 18:40:17    D#:  [id number]  Job#:  [id number]

## 2019-02-14 NOTE — PROGRESS NOTES
Radiology made aware of post op order @ approx. 17:30. Staff will come to bedside PACU asap. Awaiting arrival.

## 2019-02-14 NOTE — THERAPY
"Physical Therapy Evaluation completed.   Bed Mobility:  Supine to Sit: Maximal Assist  Transfers: Sit to Stand: Maximal Assist  Gait: Level Of Assist: Unable to Participate with Front-Wheel Walker       Plan of Care: Will benefit from Physical Therapy 5 times per week  Discharge Recommendations: Equipment: No Equipment Needed. Post-acute therapy Recommend inpatient transitional care services for continued physical therapy services.      Ms. Ovalle is a 52 y/o female who presents to acute secondary to fall with subsequent VILMA.  Due to intraoperative calcar fracture she is TTWB on L LE. She presents with significant lower extremity weakness, gross motor coordination deficits, and dynamic balance impairments that negatively impact her ability to perform gait, transfers, and bed mobility and prevent her safe discharge home. Pt struggled to follow WB precaution initially, however if she has tactile cue of therapist's foot under her L LE is able to maintain. Once this tactile cue is removed she begins to WB. Able to perform pivot transfers, due to poor ability to maintain WB without extensive assist/cues not appropriate to begin ambulation. Pt is very motivated to participate rehab and has experience with [facility] acute rehab in the past. She is able to participate in and would benefit from intensive 3 hour a day therapy. She has good family support at home and is very motivated to return to her prior level of function. She would benefit from continued acute physical therapy services to improve her mobility.     See \"Rehab Therapy-Acute\" Patient Summary Report for complete documentation.     "

## 2019-02-14 NOTE — DISCHARGE PLANNING
Aware of PMR referrals from Dr. Levine and Dr. Carrillo. GLF s/p left hip arthorplasty.  Would appreciate initial therapy evaluations to assist with determination for post acute needs. No Physiatry consult ordered as yet per protocol. TCC following. Thank you for the referral.

## 2019-02-14 NOTE — PROGRESS NOTES
Patient admitted to [facility] at approximately 2000 via hospital bed. Report received from Sheila PACU Nurse.     · 2 RN skin check complete with VERO Guillen.  · Devices in place: SCD  · Skin assessed under devices: Yes, skin intact.   · Confirmed pressure ulcers found on: pressure injury noted to left buttock  · Wound consult placed and wound reported.  · Bruising noted to abdomen, flaky bilateral foot.  · The following interventions in place: Moisturizer, waffle cushion, mepilex in place

## 2019-02-14 NOTE — PROGRESS NOTES
"S: Seems comfortable today, has been out of bed    O: Talkative as usual.  Left hip wound looks clean and dry.  Swelling and warmth left calf much better    Blood pressure 136/84, pulse (!) 104, temperature 37.1 °C (98.7 °F), temperature source Temporal, resp. rate 16, height 1.6 m (5' 3\"), weight 89 kg (196 lb 3.4 oz), SpO2 94 %, not currently breastfeeding.    Recent Labs      02/13/19   1144  02/14/19   0610   WBC  7.7  14.2*   RBC  4.47  4.14*   HEMOGLOBIN  11.9*  11.2*   HEMATOCRIT  34.9*  32.5*   MCV  78.1*  78.5*   MCH  26.6*  27.1   MCHC  34.1  34.5   RDW  48.1  48.2   PLATELETCT  347  363   MPV  9.3  10.4         Intake/Output Summary (Last 24 hours) at 02/14/19 1228  Last data filed at 02/13/19 2104   Gross per 24 hour   Intake             1910 ml   Output              150 ml   Net             1760 ml         A:  Patient Active Problem List    Diagnosis Date Noted   • Mild intermittent asthma without complication 04/25/2012     Priority: High   • Status post left hip replacement 02/13/2019   • Primary insomnia 01/03/2019   • Mixed stress and urge urinary incontinence 01/03/2019   • Edema 12/13/2018   • Vitamin D deficiency 12/13/2018   • Anemia 12/13/2018   • Cervical myelopathy (HCC) 12/11/2018   • Encounter for work capability assessment- FMLA PAPERWORK 08/29/2018   • DDD (degenerative disc disease), cervical- MOD-SEVERE SPINE NV- dr brennan; fusion and laminectomy C3-T1 12/5/2018 dr bernnan 08/09/2018   • Primary osteoarthritis of left hip- dr nowak; left THR tbd feb 6, 2019 06/07/2018   • Obesity (BMI 30-39.9) 06/07/2018   • DDD (degenerative disc disease), lumbar 04/25/2018   • Uncomplicated opioid dependence (CMS-HCC)- kylah adkins 01/31/2018   • Essential hypertension 01/31/2018   • Venous insufficiency 10/03/2017   • Colon cancer screening- needs 08/09/2017   • Chronic bilateral low back pain with bilateral sciatica- pain mgt; KYLAH ADKINS 07/26/2017   • Vitamin B 12 deficiency 06/02/2016   • History " of gastric bypass 04/25/2012       Stable after L VILMA with wiring of proximal femur    PLAN: Can DC Amoxicillin-- stasis dermatitis looks stable.  Have emphasized need for toe touch weight bearing left leg given tenuous status of implant and fixation.  Have ordered Rehab hospital eval.  Continue present care.

## 2019-02-15 ENCOUNTER — HOSPITAL ENCOUNTER (INPATIENT)
Facility: REHABILITATION | Age: 54
LOS: 14 days | DRG: 560 | End: 2019-03-01
Attending: PHYSICAL MEDICINE & REHABILITATION | Admitting: PHYSICAL MEDICINE & REHABILITATION
Payer: COMMERCIAL

## 2019-02-15 VITALS
TEMPERATURE: 98.2 F | RESPIRATION RATE: 18 BRPM | DIASTOLIC BLOOD PRESSURE: 83 MMHG | WEIGHT: 196.21 LBS | SYSTOLIC BLOOD PRESSURE: 127 MMHG | OXYGEN SATURATION: 100 % | HEIGHT: 63 IN | HEART RATE: 94 BPM | BODY MASS INDEX: 34.77 KG/M2

## 2019-02-15 DIAGNOSIS — F11.20 UNCOMPLICATED OPIOID DEPENDENCE (HCC): ICD-10-CM

## 2019-02-15 DIAGNOSIS — M54.41 CHRONIC BILATERAL LOW BACK PAIN WITH BILATERAL SCIATICA: ICD-10-CM

## 2019-02-15 DIAGNOSIS — G95.9 CERVICAL MYELOPATHY (HCC): ICD-10-CM

## 2019-02-15 DIAGNOSIS — M50.30 DDD (DEGENERATIVE DISC DISEASE), CERVICAL: ICD-10-CM

## 2019-02-15 DIAGNOSIS — M54.42 CHRONIC BILATERAL LOW BACK PAIN WITH BILATERAL SCIATICA: ICD-10-CM

## 2019-02-15 DIAGNOSIS — G89.29 CHRONIC BILATERAL LOW BACK PAIN WITH BILATERAL SCIATICA: ICD-10-CM

## 2019-02-15 DIAGNOSIS — Z96.642 STATUS POST LEFT HIP REPLACEMENT: ICD-10-CM

## 2019-02-15 LAB
25(OH)D3 SERPL-MCNC: 17 NG/ML (ref 30–100)
ALBUMIN SERPL BCP-MCNC: 2.3 G/DL (ref 3.2–4.9)
BUN SERPL-MCNC: 13 MG/DL (ref 8–22)
CALCIUM SERPL-MCNC: 8.2 MG/DL (ref 8.5–10.5)
CHLORIDE SERPL-SCNC: 109 MMOL/L (ref 96–112)
CO2 SERPL-SCNC: 27 MMOL/L (ref 20–33)
CREAT SERPL-MCNC: 0.45 MG/DL (ref 0.5–1.4)
ERYTHROCYTE [DISTWIDTH] IN BLOOD BY AUTOMATED COUNT: 48.1 FL (ref 35.9–50)
ERYTHROCYTE [DISTWIDTH] IN BLOOD BY AUTOMATED COUNT: 49.3 FL (ref 35.9–50)
FERRITIN SERPL-MCNC: 52 NG/ML (ref 10–291)
GLUCOSE SERPL-MCNC: 98 MG/DL (ref 65–99)
HCT VFR BLD AUTO: 22.6 % (ref 37–47)
HCT VFR BLD AUTO: 24.6 % (ref 37–47)
HGB BLD-MCNC: 7.6 G/DL (ref 12–16)
HGB BLD-MCNC: 8.2 G/DL (ref 12–16)
IRON SATN MFR SERPL: 6 % (ref 15–55)
IRON SERPL-MCNC: 15 UG/DL (ref 40–170)
MCH RBC QN AUTO: 26.5 PG (ref 27–33)
MCH RBC QN AUTO: 26.5 PG (ref 27–33)
MCHC RBC AUTO-ENTMCNC: 33.3 G/DL (ref 33.6–35)
MCHC RBC AUTO-ENTMCNC: 33.8 G/DL (ref 33.6–35)
MCV RBC AUTO: 78.4 FL (ref 81.4–97.8)
MCV RBC AUTO: 79.6 FL (ref 81.4–97.8)
PHOSPHATE SERPL-MCNC: 3.2 MG/DL (ref 2.5–4.5)
PLATELET # BLD AUTO: 245 K/UL (ref 164–446)
PLATELET # BLD AUTO: 294 K/UL (ref 164–446)
PMV BLD AUTO: 10 FL (ref 9–12.9)
PMV BLD AUTO: 9.6 FL (ref 9–12.9)
POTASSIUM SERPL-SCNC: 3.7 MMOL/L (ref 3.6–5.5)
RBC # BLD AUTO: 2.83 M/UL (ref 4.2–5.4)
RBC # BLD AUTO: 3.09 M/UL (ref 4.2–5.4)
SODIUM SERPL-SCNC: 140 MMOL/L (ref 135–145)
TIBC SERPL-MCNC: 252 UG/DL (ref 250–450)
VIT B12 SERPL-MCNC: 251 PG/ML (ref 211–911)
WBC # BLD AUTO: 8.1 K/UL (ref 4.8–10.8)
WBC # BLD AUTO: 9.4 K/UL (ref 4.8–10.8)

## 2019-02-15 PROCEDURE — A9270 NON-COVERED ITEM OR SERVICE: HCPCS | Performed by: HOSPITALIST

## 2019-02-15 PROCEDURE — 99239 HOSP IP/OBS DSCHRG MGMT >30: CPT | Performed by: HOSPITALIST

## 2019-02-15 PROCEDURE — 80069 RENAL FUNCTION PANEL: CPT

## 2019-02-15 PROCEDURE — 82607 VITAMIN B-12: CPT

## 2019-02-15 PROCEDURE — 83550 IRON BINDING TEST: CPT

## 2019-02-15 PROCEDURE — A9270 NON-COVERED ITEM OR SERVICE: HCPCS | Performed by: PHYSICAL MEDICINE & REHABILITATION

## 2019-02-15 PROCEDURE — 83540 ASSAY OF IRON: CPT

## 2019-02-15 PROCEDURE — A9270 NON-COVERED ITEM OR SERVICE: HCPCS | Performed by: STUDENT IN AN ORGANIZED HEALTH CARE EDUCATION/TRAINING PROGRAM

## 2019-02-15 PROCEDURE — 700105 HCHG RX REV CODE 258: Performed by: HOSPITALIST

## 2019-02-15 PROCEDURE — 84207 ASSAY OF VITAMIN B-6: CPT

## 2019-02-15 PROCEDURE — 700102 HCHG RX REV CODE 250 W/ 637 OVERRIDE(OP): Performed by: STUDENT IN AN ORGANIZED HEALTH CARE EDUCATION/TRAINING PROGRAM

## 2019-02-15 PROCEDURE — 770010 HCHG ROOM/CARE - REHAB SEMI PRIVAT*

## 2019-02-15 PROCEDURE — 700102 HCHG RX REV CODE 250 W/ 637 OVERRIDE(OP): Performed by: PHYSICAL MEDICINE & REHABILITATION

## 2019-02-15 PROCEDURE — 82728 ASSAY OF FERRITIN: CPT

## 2019-02-15 PROCEDURE — 36415 COLL VENOUS BLD VENIPUNCTURE: CPT

## 2019-02-15 PROCEDURE — 700112 HCHG RX REV CODE 229: Performed by: PHYSICAL MEDICINE & REHABILITATION

## 2019-02-15 PROCEDURE — 700111 HCHG RX REV CODE 636 W/ 250 OVERRIDE (IP): Performed by: HOSPITALIST

## 2019-02-15 PROCEDURE — 99223 1ST HOSP IP/OBS HIGH 75: CPT | Performed by: PHYSICAL MEDICINE & REHABILITATION

## 2019-02-15 PROCEDURE — 82306 VITAMIN D 25 HYDROXY: CPT

## 2019-02-15 PROCEDURE — 85027 COMPLETE CBC AUTOMATED: CPT

## 2019-02-15 PROCEDURE — 700112 HCHG RX REV CODE 229: Performed by: STUDENT IN AN ORGANIZED HEALTH CARE EDUCATION/TRAINING PROGRAM

## 2019-02-15 PROCEDURE — 700102 HCHG RX REV CODE 250 W/ 637 OVERRIDE(OP): Performed by: HOSPITALIST

## 2019-02-15 PROCEDURE — 97530 THERAPEUTIC ACTIVITIES: CPT

## 2019-02-15 PROCEDURE — 84425 ASSAY OF VITAMIN B-1: CPT

## 2019-02-15 RX ORDER — CHOLECALCIFEROL (VITAMIN D3) 125 MCG
1000 CAPSULE ORAL DAILY
Status: DISCONTINUED | OUTPATIENT
Start: 2019-02-16 | End: 2019-02-15 | Stop reason: HOSPADM

## 2019-02-15 RX ORDER — TRAMADOL HYDROCHLORIDE 50 MG/1
50 TABLET ORAL EVERY 4 HOURS PRN
Qty: 30 TAB | Refills: 0 | Status: ON HOLD
Start: 2019-02-15 | End: 2019-02-28

## 2019-02-15 RX ORDER — MORPHINE SULFATE 30 MG/1
30 TABLET, FILM COATED, EXTENDED RELEASE ORAL EVERY 12 HOURS
Status: DISCONTINUED | OUTPATIENT
Start: 2019-02-15 | End: 2019-03-02 | Stop reason: HOSPADM

## 2019-02-15 RX ORDER — OXYCODONE HYDROCHLORIDE 10 MG/1
10 TABLET ORAL
Status: CANCELLED | OUTPATIENT
Start: 2019-02-15

## 2019-02-15 RX ORDER — POLYVINYL ALCOHOL 14 MG/ML
1 SOLUTION/ DROPS OPHTHALMIC PRN
Status: DISCONTINUED | OUTPATIENT
Start: 2019-02-15 | End: 2019-03-02 | Stop reason: HOSPADM

## 2019-02-15 RX ORDER — METHOCARBAMOL 500 MG/1
1000 TABLET, FILM COATED ORAL 4 TIMES DAILY
Status: DISCONTINUED | OUTPATIENT
Start: 2019-02-15 | End: 2019-03-02 | Stop reason: HOSPADM

## 2019-02-15 RX ORDER — TRAMADOL HYDROCHLORIDE 50 MG/1
50 TABLET ORAL EVERY 4 HOURS PRN
Status: CANCELLED | OUTPATIENT
Start: 2019-02-15

## 2019-02-15 RX ORDER — ERGOCALCIFEROL 1.25 MG/1
50000 CAPSULE ORAL
Status: CANCELLED | OUTPATIENT
Start: 2019-02-22 | End: 2019-04-12

## 2019-02-15 RX ORDER — ACETAMINOPHEN 500 MG
1000 TABLET ORAL EVERY 6 HOURS
Status: CANCELLED | OUTPATIENT
Start: 2019-02-15 | End: 2019-02-18

## 2019-02-15 RX ORDER — FERROUS SULFATE 325(65) MG
325 TABLET ORAL
Status: DISCONTINUED | OUTPATIENT
Start: 2019-02-16 | End: 2019-02-22

## 2019-02-15 RX ORDER — HYDRALAZINE HYDROCHLORIDE 25 MG/1
25 TABLET, FILM COATED ORAL EVERY 8 HOURS PRN
Status: DISCONTINUED | OUTPATIENT
Start: 2019-02-15 | End: 2019-03-02 | Stop reason: HOSPADM

## 2019-02-15 RX ORDER — TRAZODONE HYDROCHLORIDE 50 MG/1
50 TABLET ORAL
Status: DISCONTINUED | OUTPATIENT
Start: 2019-02-15 | End: 2019-03-02 | Stop reason: HOSPADM

## 2019-02-15 RX ORDER — GABAPENTIN 300 MG/1
300 CAPSULE ORAL 3 TIMES DAILY
Status: CANCELLED | OUTPATIENT
Start: 2019-02-15

## 2019-02-15 RX ORDER — LANOLIN ALCOHOL/MO/W.PET/CERES
325 CREAM (GRAM) TOPICAL
Start: 2019-02-15 | End: 2019-02-15

## 2019-02-15 RX ORDER — CHOLECALCIFEROL (VITAMIN D3) 125 MCG
1000 CAPSULE ORAL DAILY
Status: CANCELLED | OUTPATIENT
Start: 2019-02-16

## 2019-02-15 RX ORDER — CELECOXIB 200 MG/1
200 CAPSULE ORAL 2 TIMES DAILY WITH MEALS
Qty: 60 CAP | Status: ON HOLD
Start: 2019-02-15 | End: 2019-02-28

## 2019-02-15 RX ORDER — OXYCODONE HYDROCHLORIDE 10 MG/1
10 TABLET ORAL
Status: DISCONTINUED | OUTPATIENT
Start: 2019-02-15 | End: 2019-03-02 | Stop reason: HOSPADM

## 2019-02-15 RX ORDER — ONDANSETRON 4 MG/1
4 TABLET, ORALLY DISINTEGRATING ORAL 4 TIMES DAILY PRN
Status: DISCONTINUED | OUTPATIENT
Start: 2019-02-15 | End: 2019-03-02 | Stop reason: HOSPADM

## 2019-02-15 RX ORDER — ASPIRIN 81 MG/1
81 TABLET ORAL 2 TIMES DAILY
Qty: 30 TAB | Status: ON HOLD
Start: 2019-02-15 | End: 2019-02-28

## 2019-02-15 RX ORDER — FERROUS SULFATE 325(65) MG
325 TABLET ORAL
Status: CANCELLED | OUTPATIENT
Start: 2019-02-16

## 2019-02-15 RX ORDER — ONDANSETRON 2 MG/ML
4 INJECTION INTRAMUSCULAR; INTRAVENOUS 4 TIMES DAILY PRN
Status: DISCONTINUED | OUTPATIENT
Start: 2019-02-15 | End: 2019-03-02 | Stop reason: HOSPADM

## 2019-02-15 RX ORDER — ACETAMINOPHEN 500 MG
1000 TABLET ORAL EVERY 6 HOURS
Status: DISCONTINUED | OUTPATIENT
Start: 2019-02-15 | End: 2019-02-16

## 2019-02-15 RX ORDER — ZOLPIDEM TARTRATE 5 MG/1
5 TABLET ORAL NIGHTLY PRN
Status: DISCONTINUED | OUTPATIENT
Start: 2019-02-15 | End: 2019-03-02 | Stop reason: HOSPADM

## 2019-02-15 RX ORDER — ACETAMINOPHEN 325 MG/1
650 TABLET ORAL EVERY 4 HOURS PRN
Status: DISCONTINUED | OUTPATIENT
Start: 2019-02-15 | End: 2019-02-16

## 2019-02-15 RX ORDER — ZOLPIDEM TARTRATE 5 MG/1
5 TABLET ORAL NIGHTLY PRN
Status: CANCELLED | OUTPATIENT
Start: 2019-02-15

## 2019-02-15 RX ORDER — SCOLOPAMINE TRANSDERMAL SYSTEM 1 MG/1
1 PATCH, EXTENDED RELEASE TRANSDERMAL
Status: DISCONTINUED | OUTPATIENT
Start: 2019-02-15 | End: 2019-03-02 | Stop reason: HOSPADM

## 2019-02-15 RX ORDER — ERGOCALCIFEROL 1.25 MG/1
50000 CAPSULE ORAL
Status: DISCONTINUED | OUTPATIENT
Start: 2019-02-15 | End: 2019-02-15 | Stop reason: HOSPADM

## 2019-02-15 RX ORDER — GABAPENTIN 300 MG/1
300 CAPSULE ORAL 3 TIMES DAILY
Status: DISCONTINUED | OUTPATIENT
Start: 2019-02-15 | End: 2019-03-02 | Stop reason: HOSPADM

## 2019-02-15 RX ORDER — TRAMADOL HYDROCHLORIDE 50 MG/1
50 TABLET ORAL EVERY 4 HOURS PRN
Status: DISCONTINUED | OUTPATIENT
Start: 2019-02-15 | End: 2019-03-02 | Stop reason: HOSPADM

## 2019-02-15 RX ORDER — DOCUSATE SODIUM 100 MG/1
100 CAPSULE, LIQUID FILLED ORAL 2 TIMES DAILY
Status: CANCELLED | OUTPATIENT
Start: 2019-02-15

## 2019-02-15 RX ORDER — ACETAMINOPHEN 500 MG
1000 TABLET ORAL EVERY 6 HOURS
Qty: 24 TAB | Refills: 0 | Status: ON HOLD
Start: 2019-02-15 | End: 2019-02-28

## 2019-02-15 RX ORDER — CYANOCOBALAMIN 1000 UG/ML
1000 INJECTION, SOLUTION INTRAMUSCULAR; SUBCUTANEOUS ONCE
Status: COMPLETED | OUTPATIENT
Start: 2019-02-15 | End: 2019-02-15

## 2019-02-15 RX ORDER — MORPHINE SULFATE 30 MG/1
30 TABLET, FILM COATED, EXTENDED RELEASE ORAL EVERY 12 HOURS
Status: CANCELLED | OUTPATIENT
Start: 2019-02-15

## 2019-02-15 RX ORDER — ECHINACEA PURPUREA EXTRACT 125 MG
2 TABLET ORAL PRN
Status: DISCONTINUED | OUTPATIENT
Start: 2019-02-15 | End: 2019-03-02 | Stop reason: HOSPADM

## 2019-02-15 RX ORDER — ACETAMINOPHEN 325 MG/1
650 TABLET ORAL EVERY 6 HOURS PRN
Status: CANCELLED | OUTPATIENT
Start: 2019-02-15

## 2019-02-15 RX ORDER — ACETAMINOPHEN 325 MG/1
650 TABLET ORAL EVERY 6 HOURS PRN
Status: DISCONTINUED | OUTPATIENT
Start: 2019-02-15 | End: 2019-02-16

## 2019-02-15 RX ORDER — METHOCARBAMOL 500 MG/1
1000 TABLET, FILM COATED ORAL 4 TIMES DAILY
Status: CANCELLED | OUTPATIENT
Start: 2019-02-15

## 2019-02-15 RX ORDER — ERGOCALCIFEROL 1.25 MG/1
50000 CAPSULE ORAL
Status: DISCONTINUED | OUTPATIENT
Start: 2019-02-22 | End: 2019-02-18

## 2019-02-15 RX ORDER — ALUMINA, MAGNESIA, AND SIMETHICONE 2400; 2400; 240 MG/30ML; MG/30ML; MG/30ML
20 SUSPENSION ORAL
Status: DISCONTINUED | OUTPATIENT
Start: 2019-02-15 | End: 2019-03-02 | Stop reason: HOSPADM

## 2019-02-15 RX ORDER — ERGOCALCIFEROL 1.25 MG/1
50000 CAPSULE ORAL
Qty: 30 CAP
Start: 2019-02-22 | End: 2019-04-13

## 2019-02-15 RX ORDER — DOCUSATE SODIUM 100 MG/1
100 CAPSULE, LIQUID FILLED ORAL 2 TIMES DAILY
Status: DISCONTINUED | OUTPATIENT
Start: 2019-02-15 | End: 2019-03-02 | Stop reason: HOSPADM

## 2019-02-15 RX ORDER — SCOLOPAMINE TRANSDERMAL SYSTEM 1 MG/1
1 PATCH, EXTENDED RELEASE TRANSDERMAL
Status: CANCELLED | OUTPATIENT
Start: 2019-02-15

## 2019-02-15 RX ADMIN — CELECOXIB 200 MG: 200 CAPSULE ORAL at 08:37

## 2019-02-15 RX ADMIN — ASPIRIN 81 MG: 81 TABLET, COATED ORAL at 04:56

## 2019-02-15 RX ADMIN — ACETAMINOPHEN 1000 MG: 325 TABLET, FILM COATED ORAL at 08:37

## 2019-02-15 RX ADMIN — CYANOCOBALAMIN 1000 MCG: 1000 INJECTION, SOLUTION INTRAMUSCULAR; SUBCUTANEOUS at 08:37

## 2019-02-15 RX ADMIN — DOCUSATE SODIUM 100 MG: 100 CAPSULE, LIQUID FILLED ORAL at 04:55

## 2019-02-15 RX ADMIN — METHOCARBAMOL 1000 MG: 500 TABLET, FILM COATED ORAL at 08:37

## 2019-02-15 RX ADMIN — OXYCODONE HYDROCHLORIDE 10 MG: 10 TABLET ORAL at 03:39

## 2019-02-15 RX ADMIN — ACETAMINOPHEN 1000 MG: 500 TABLET, FILM COATED ORAL at 21:02

## 2019-02-15 RX ADMIN — GABAPENTIN 300 MG: 300 CAPSULE ORAL at 21:06

## 2019-02-15 RX ADMIN — OXYCODONE HYDROCHLORIDE 10 MG: 10 TABLET ORAL at 14:22

## 2019-02-15 RX ADMIN — ERGOCALCIFEROL 50000 UNITS: 1.25 CAPSULE ORAL at 08:37

## 2019-02-15 RX ADMIN — ACETAMINOPHEN 1000 MG: 325 TABLET, FILM COATED ORAL at 14:22

## 2019-02-15 RX ADMIN — GABAPENTIN 300 MG: 300 CAPSULE ORAL at 13:05

## 2019-02-15 RX ADMIN — MORPHINE SULFATE 30 MG: 30 TABLET, EXTENDED RELEASE ORAL at 21:02

## 2019-02-15 RX ADMIN — METHOCARBAMOL TABLETS 1000 MG: 500 TABLET, COATED ORAL at 17:36

## 2019-02-15 RX ADMIN — OMEPRAZOLE 20 MG: 20 CAPSULE, DELAYED RELEASE ORAL at 04:55

## 2019-02-15 RX ADMIN — SODIUM CHLORIDE: 9 INJECTION, SOLUTION INTRAVENOUS at 03:40

## 2019-02-15 RX ADMIN — ACETAMINOPHEN 1000 MG: 325 TABLET, FILM COATED ORAL at 03:39

## 2019-02-15 RX ADMIN — MORPHINE SULFATE 30 MG: 15 TABLET, EXTENDED RELEASE ORAL at 04:55

## 2019-02-15 RX ADMIN — METHOCARBAMOL 1000 MG: 500 TABLET, FILM COATED ORAL at 13:05

## 2019-02-15 RX ADMIN — ASPIRIN 81 MG: 81 TABLET, COATED ORAL at 17:36

## 2019-02-15 RX ADMIN — SENNOSIDES AND DOCUSATE SODIUM 2 TABLET: 8.6; 5 TABLET ORAL at 04:56

## 2019-02-15 RX ADMIN — DOCUSATE SODIUM 100 MG: 100 CAPSULE, LIQUID FILLED ORAL at 21:07

## 2019-02-15 RX ADMIN — METHOCARBAMOL TABLETS 1000 MG: 500 TABLET, COATED ORAL at 21:06

## 2019-02-15 RX ADMIN — OXYCODONE HYDROCHLORIDE 10 MG: 10 TABLET ORAL at 08:42

## 2019-02-15 RX ADMIN — GABAPENTIN 300 MG: 300 CAPSULE ORAL at 04:56

## 2019-02-15 ASSESSMENT — COGNITIVE AND FUNCTIONAL STATUS - GENERAL
MOBILITY SCORE: 9
MOVING FROM LYING ON BACK TO SITTING ON SIDE OF FLAT BED: UNABLE
CLIMB 3 TO 5 STEPS WITH RAILING: TOTAL
SUGGESTED CMS G CODE MODIFIER MOBILITY: CM
TURNING FROM BACK TO SIDE WHILE IN FLAT BAD: A LITTLE
WALKING IN HOSPITAL ROOM: TOTAL
STANDING UP FROM CHAIR USING ARMS: A LOT
MOVING TO AND FROM BED TO CHAIR: UNABLE

## 2019-02-15 ASSESSMENT — PATIENT HEALTH QUESTIONNAIRE - PHQ9
2. FEELING DOWN, DEPRESSED, IRRITABLE, OR HOPELESS: NOT AT ALL
1. LITTLE INTEREST OR PLEASURE IN DOING THINGS: NOT AT ALL
SUM OF ALL RESPONSES TO PHQ9 QUESTIONS 1 AND 2: 0

## 2019-02-15 ASSESSMENT — GAIT ASSESSMENTS: GAIT LEVEL OF ASSIST: UNABLE TO PARTICIPATE

## 2019-02-15 ASSESSMENT — LIFESTYLE VARIABLES: ALCOHOL_USE: NO

## 2019-02-15 NOTE — DISCHARGE SUMMARY
Discharge Summary    CHIEF COMPLAINT ON ADMISSION  Chief Complaint   Patient presents with   • Hip Pain     L hip pain, slipped in shower and lowered herself to ground       Reason for Admission  EMS     CODE STATUS  Full Code    HPI & HOSPITAL COURSE  53 y.o. female with chronic left hip pain and was getting ready for elective surgery with Dr Levine but fell/slipped in her shower requiring paramedics bring her to the hospital. She was found to have question of acetabular fracture.  She was admitted.  She denied any syncope.  She was taken by ortho for left total hip arthroplasty with cabling of calcar.  Patient is now recovering well after the procedure.  She will be sent to rehab for further therapy.  She was found to have vitamin B12 as well as vitamin D deficiency so she is being repleted with these.  She was also found to have iron deficiency so she will also be sent with iron supplementation.       Therefore, she is discharged in good and stable condition to an inpatient rehabilitation hospital.    The patient met 2-midnight criteria for an inpatient stay at the time of discharge.      FOLLOW UP ITEMS POST DISCHARGE  none    DISCHARGE DIAGNOSES  Active Problems:    History of gastric bypass POA: Yes    Uncomplicated opioid dependence (CMS-[facility])- kylah adkins POA: Yes    Essential hypertension POA: Yes    Primary osteoarthritis of left hip- dr levine; left THR tbd feb 6, 2019 POA: Yes    Obesity (BMI 30-39.9) POA: Yes    Status post left hip replacement POA: Unknown  Resolved Problems:    * No resolved hospital problems. *      FOLLOW UP  Future Appointments  Date Time Provider Department Center   3/4/2019 5:20 PM Micky Rubin M.D. [facility]     No follow-up provider specified.    MEDICATIONS ON DISCHARGE     Medication List      START taking these medications      Instructions   acetaminophen 500 MG Tabs  Commonly known as:  TYLENOL   Take 2 Tabs by mouth every 6 hours for 3 days.  Dose:  1000 mg      aspirin 81 MG EC tablet   Take 1 Tab by mouth 2 times a day.  Dose:  81 mg     benzocaine-menthol 15-3.6 MG Lozg  Commonly known as:  CEPACOL   Spray 1 Lozenge in mouth/throat every 2 hours as needed (Sore throat).  Dose:  1 Lozenge     celecoxib 200 MG Caps  Commonly known as:  CELEBREX   Take 1 Cap by mouth 2 times a day, with meals for 3 days.  Dose:  200 mg     tramadol 50 MG Tabs  Commonly known as:  ULTRAM   Take 1 Tab by mouth every four hours as needed (Moderate Pain (NRS Pain Scale 4-6; CPOT Pain Scale 3-5) if opiates not ordered or tolerated) for up to 5 days.  Dose:  50 mg     vitamin D (Ergocalciferol) 42280 units Caps capsule  Start taking on:  2/22/2019  Commonly known as:  DRISDOL   Take 1 Cap by mouth every 7 days for 8 doses.  Dose:  61041 Units        CONTINUE taking these medications      Instructions   ALEVE PM PO   Take  by mouth as needed.     ascorbic acid 500 MG tablet  Commonly known as:  VITAMIN C   Take 1 Tab by mouth every morning with breakfast.  Dose:  500 mg     calcium carbonate 500 MG Chew  Commonly known as:  TUMS   Take 1 Tab by mouth 2 Times a Day.  Dose:  500 mg     cyanocobalamin 1000 MCG Tabs  Commonly known as:  VITAMIN B12   Take 1 Tab by mouth every day.  Dose:  1000 mcg     docusate sodium 100 MG Caps   Take 100 mg by mouth 2 Times a Day.  Dose:  100 mg     ferrous sulfate 325 (65 Fe) MG tablet   Take 1 Tab by mouth every morning with breakfast.  Dose:  325 mg     furosemide 40 MG Tabs  Commonly known as:  LASIX   Take 1 Tab by mouth every day.  Dose:  40 mg     gabapentin 300 MG Caps  Commonly known as:  NEURONTIN   Take 1 Cap by mouth 3 times a day.  Dose:  300 mg     methocarbamol 500 MG Tabs  Commonly known as:  ROBAXIN   Take 2 Tabs by mouth 4 times a day.  Dose:  1000 mg     Misc. Devices Misc   Adult briefs XXL (FEMALE) FOR URINARY INCONTINENCE- ANY BRAND PER PT PREFERENCE OR INSURANC ALLOWANCE; TO CHANGE QID     morphine ER 30 MG Tbcr tablet  Commonly known as:   MS CONTIN   Take 30 mg by mouth every 12 hours.  Dose:  30 mg     NARCAN 4 MG/0.1ML Liqd  Generic drug:  Naloxone HCl      omeprazole 20 MG delayed-release capsule  Commonly known as:  PRILOSEC   Take 1 Cap by mouth every day.  Dose:  20 mg     oxyCODONE immediate release 10 MG immediate release tablet  Commonly known as:  ROXICODONE   Take 10 mg by mouth every four hours as needed for Severe Pain.  Dose:  10 mg     potassium chloride SA 20 MEQ Tbcr  Commonly known as:  Robelur   Doctor's comments:  REPLACES 10 MEQ DOSE SENT EARLIER TODAY  Take 1 Tab by mouth every day.  Dose:  20 mEq        STOP taking these medications    Cholecalciferol 5000 units Tabs            Allergies  No Known Allergies    DIET  Orders Placed This Encounter   Procedures   • Diet Order Regular     Standing Status:   Standing     Number of Occurrences:   1     Order Specific Question:   Diet:     Answer:   Regular [1]       ACTIVITY  As tolerated and directed by rehab.  Toe touch LEFT leg    LINES, DRAINS, AND WOUNDS  This is an automated list. Peripheral IVs will be removed prior to discharge.  Peripheral IV 02/13/19 20 G Left Hand (Active)   Site Assessment Clean;Dry;Intact 2/14/2019  8:00 PM   Dressing Type Occlusive;Transparent 2/14/2019  8:00 PM   Line Status Scrubbed the hub prior to access;Flushed;Blood return noted;Infusing 2/14/2019  8:00 PM   Dressing Status Clean;Dry;Intact 2/14/2019  8:00 PM   Dressing Intervention N/A 2/14/2019  8:00 PM   Date Primary Tubing Changed 02/12/19 2/14/2019  8:00 PM   Date Secondary Tubing Changed 02/12/19 2/13/2019  8:00 PM   NEXT Primary Tubing Change  02/16/19 2/14/2019  8:00 PM   NEXT Secondary Tubing Change  02/13/19 2/13/2019  8:00 PM   Infiltration Grading ([facility]) 0 2/14/2019  8:00 PM   Phlebitis Scale ([facility] Only) 0 2/14/2019  8:00 PM       Wound 01/05/19 Incision Neck surgical incision to back of neck (Active)       Peripheral IV 02/13/19 20 G Left Hand (Active)   Site Assessment  Clean;Dry;Intact 2/14/2019  8:00 PM   Dressing Type Occlusive;Transparent 2/14/2019  8:00 PM   Line Status Scrubbed the hub prior to access;Flushed;Blood return noted;Infusing 2/14/2019  8:00 PM   Dressing Status Clean;Dry;Intact 2/14/2019  8:00 PM   Dressing Intervention N/A 2/14/2019  8:00 PM   Date Primary Tubing Changed 02/12/19 2/14/2019  8:00 PM   Date Secondary Tubing Changed 02/12/19 2/13/2019  8:00 PM   NEXT Primary Tubing Change  02/16/19 2/14/2019  8:00 PM   NEXT Secondary Tubing Change  02/13/19 2/13/2019  8:00 PM   Infiltration Grading ([facility], [facility]) 0 2/14/2019  8:00 PM   Phlebitis Scale ([facility] Only) 0 2/14/2019  8:00 PM               MENTAL STATUS ON TRANSFER  Level of Consciousness: Alert  Orientation : Oriented x 4  Speech: Speech Clear    CONSULTATIONS  Rehab Dr. Harper    PROCEDURES  POSTOPERATIVE DIAGNOSES:  1.  Severe degenerative arthritis, left hip.  2.  Acute femoral head collapse.  3.  Intraoperative calcar crack.   PROCEDURE:  Left total hip arthroplasty with cabling of calcar.    LABORATORY  Lab Results   Component Value Date    SODIUM 140 02/15/2019    POTASSIUM 3.7 02/15/2019    CHLORIDE 109 02/15/2019    CO2 27 02/15/2019    GLUCOSE 98 02/15/2019    BUN 13 02/15/2019    CREATININE 0.45 (L) 02/15/2019    CREATININE 0.88 08/14/2010    GLOMRATE >59 08/14/2010        Lab Results   Component Value Date    WBC 9.4 02/15/2019    WBC 8.2 08/14/2010    HEMOGLOBIN 8.2 (L) 02/15/2019    HEMATOCRIT 24.6 (L) 02/15/2019    PLATELETCT 294 02/15/2019        Total time of the discharge process exceeds 46 minutes.

## 2019-02-15 NOTE — DISCHARGE PLANNING
Dr. Harper is recommending RIRF.  Submitted to insurance. Msg placed to Dr. White-seeking medical clearance.

## 2019-02-15 NOTE — THERAPY
"Physical Therapy Treatment completed.   Bed Mobility:  Supine to Sit: Moderate Assist  Transfers: Sit to Stand: Moderate Assist  Gait: Level Of Assist: Unable to Participate   Plan of Care: Will benefit from Physical Therapy 5 times per week  Discharge Recommendations: Equipment: Will Continue to Assess for Equipment Needs. Post-acute therapy Discharge to a transitional care facility for continued skilled therapy services.     See \"Rehab Therapy-Acute\" Patient Summary Report for complete documentation.     Pt pleasant and agreeable to therapy session. Pt required modA to get to EOB with assist for BLE. Pt requesting use of BSC. Performed stand pivot with FWW and maxA. Cues to maintain TTWB which she did well the first time. Transfer back from bsc to bed she did not maintain TTWB And required max cues. Pt seemed to be distracted and not always receptive to therapist education and cues. Pt practiced 4 sit to stands throughout tx session with Hilary, second person for safety. Pt did improve with maintaining ttwb during transfers. Pt did improve during tx session and able to maintain TTWB 50% of time but required max cues as reminders. Pt unable to take full steps at this time but was able to shimmy/slide RLE along side of bed for 2 steps. Pt will benefit from continued acute skilled thearpy at this time to progress mobility. Will conitnue to follow while in house.   "

## 2019-02-15 NOTE — CARE PLAN
Problem: Venous Thromboembolism (VTW)/Deep Vein Thrombosis (DVT) Prevention:  Goal: Patient will participate in Venous Thrombosis (VTE)/Deep Vein Thrombosis (DVT)Prevention Measures    Intervention: Assess and monitor for anticoagulation complications  Continues on ASA, no visible s/s of active bleeding noted.  Intervention: Ensure patient wears graduated elastic stockings (SHONNA hose) and/or SCDs, if ordered, when in bed or chair (Remove at least once per shift for skin check)  SCD in use to BLE      Problem: Pain Management  Goal: Pain level will decrease to patient's comfort goal    Intervention: Follow pain managment plan developed in collaboration with patient and Interdisciplinary Team  PRN Oxycodone given per MAR with + effect. Other routine meds given as ordered. Polar ice in place.

## 2019-02-15 NOTE — PREADMISSION SCREENING NOTE
"  Pre-Admission Screening Form    Patient Information:   Name: Karine Ovalle     MRN: [id number]       : 1965      Age: 53 y.o.   Gender: female      Race: Black or  [2]       Marital Status: Single [1]  Family Contact: Berto Ruiz,Gaudencio Carrillo,Jae Swann        Relationship: Other [10]  Son [15]  Friend [5]  Other [10]  Home Phone: [phone]           Cell Phone: [phone]    Advanced Directives: None  Code Status:  FULL  Current Attending Provider: Ramu White M.D.  Referring Physician: Dr. Levine   Physiatrist Consult: Dr. Harper    Referral Date: 19  Primary Payor Source:  [employer] / [employer]  Secondary Payor Source:      Medical Information:   Date of Admission to Acute Care Sett[place]2019  Room Number: T322/00  Rehabilitation Diagnosis: 08.11 Unilateral Hip Fracture  Immunization History   Administered Date(s) Administered   • Influenza Vaccine Quad Inj (Pf) 2018   • Pneumococcal polysaccharide vaccine (PPSV-23) 2018   • Tdap Vaccine 2012     No Known Allergies  Past Medical History:   Diagnosis Date   • Anemia    • Arthritis     osteo/hips and back   • At risk for falls    • Chronic back pain    • Dental disorder     lower denture   • Pain 2018    \"ALL OVER\", 10/10   • Pain 2019    arthritic pain   • Urinary incontinence     using pads     Past Surgical History:   Procedure Laterality Date   • HIP ARTHROPLASTY TOTAL Left 2019    Procedure: HIP ARTHROPLASTY TOTAL, Cabling of intra-operative calcar fracture;  Surgeon: Bryon Levine M.D.;  Location: SURGERY [facility];  Service: Orthopedics   • CERVICAL FUSION POSTERIOR  2018    Procedure: CERVICAL FUSION POSTERIOR- STAGE #2 C3-5 AND C3-T1;  Surgeon: Emre Mendoza III, M.D.;  Location: SURGERY [facility];  Service: Neurosurgery   • CERVICAL LAMINECTOMY POSTERIOR  2018 " "   Procedure: CERVICAL LAMINECTOMY POSTERIOR C2-T1;  Surgeon: Emre Mendoza III, M.D.;  Location: SURGERY [facility];  Service: Neurosurgery   • CERVICAL DISK AND FUSION ANTERIOR  12/5/2018    Procedure: CERVICAL DISK AND FUSION ANTERIOR-STAGE #1 C4-5;  Surgeon: Emre Mendoza III, M.D.;  Location: SURGERY [facility];  Service: Neurosurgery   • CORPECTOMY  12/5/2018    Procedure: CORPECTOMY;  Surgeon: Emre Mendoza III, M.D.;  Location: SURGERY [facility];  Service: Neurosurgery   • HIP ARTHROPLASTY TOTAL Right 3/20/2018    Procedure: HIP ARTHROPLASTY TOTAL;  Surgeon: Bryon Levine M.D.;  Location: SURGERY [facility];  Service: Orthopedics   • KNEE ARTHROPLASTY TOTAL Right 10/20/2015    Procedure: KNEE ARTHROPLASTY TOTAL;  Surgeon: Bryon Levine M.D.;  Location: SURGERY [facility];  Service:    • APPENDECTOMY LAPAROSCOPIC  3/21/2013    Performed by Dali Queen M.D. at [facility]   • DEBRIDEMENT  5/17/2012    Performed by BRADLEY DYKES at SURGERY [facility]   • PLASTIC SURGERY  2004    excess skin on arms and legs removed   • MAMMOPLASTY AUGMENTATION Bilateral 2004    breast implants and \"tummy tuck\"   • GASTRIC BYPASS LAPAROSCOPIC  2002    x 2   • ABDOMINAL EXPLORATION         History Leading to Admission, Conditions that Caused the Need for Rehab (CMS):     Deangelo Carrillo D.O. Physician [facility] Medicine  H&P Date of Service: 2/13/2019  2:00 PM         []Hide copied text  [facility] Medicine History & Physical Note     Date of Service  2/13/2019  Note signed:  2/14/2019     Primary Care Physician  Micky Rubin M.D.     Consultants  Orthopedic, Dr Levine     Code Status  FULL     Chief Complaint  Fell today in shower and was suppose to have hip surgery.  Called paramedics     History of Presenting Illness  53 y.o. female with chronic left hip pain and was getting ready for surgery today with Dr Levine but fell/slipped in her shower requiring " paramedics bring her to the hospital. She was found to have question of acetabular fracture per ED physician discussion with me.  Dr Levine was at [facility] but patient was transferred to [facility].  The patient is alert and oriented with clear speech. She denies any syncope or LOC in her shower.     Review of Systems  Review of Systems   Constitutional: Negative for fever.   Eyes: Negative for blurred vision.   Respiratory: Negative for shortness of breath and stridor.    Cardiovascular: Positive for leg swelling. Negative for chest pain and palpitations.   Gastrointestinal: Negative for abdominal pain and nausea.   Genitourinary: Negative for dysuria.   Musculoskeletal: Positive for back pain and joint pain.   Neurological: Negative for dizziness, speech change, loss of consciousness and weakness.   Psychiatric/Behavioral: The patient is not nervous/anxious.          Past Medical History   has a past medical history of Anemia; Arthritis; At risk for falls; Chronic back pain; Dental disorder; Pain (12/04/2018); Pain (02/04/2019); and Urinary incontinence. She also has no past medical history of ASTHMA; Breast cancer (HCC); or Diabetes.     Surgical History   has a past surgical history that includes gastric bypass laparoscopic (2002); abdominal exploration; plastic surgery (2004); debridement (5/17/2012); appendectomy laparoscopic (3/21/2013); knee arthroplasty total (Right, 10/20/2015); mammoplasty augmentation (Bilateral, 2004); hip arthroplasty total (Right, 3/20/2018); cervical disk and fusion anterior (12/5/2018); corpectomy (12/5/2018); cervical fusion posterior (12/7/2018); cervical laminectomy posterior (12/7/2018); and hip arthroplasty total (Left, 2/13/2019).      Family History  family history includes Diabetes in her mother; Heart Disease in her mother.      Social History   reports that she has never smoked. She has never used smokeless tobacco. She reports that she drinks alcohol.  She reports that she does not use drugs.     Allergies  No Known Allergies     Medications  Prior to Admission Medications   Prescriptions Last Dose Informant Patient Reported? Taking?   Misc. Devices Misc     No No   Sig: Adult briefs XXL (FEMALE) FOR URINARY INCONTINENCE- ANY BRAND PER PT PREFERENCE OR INSURANC ALLOWANCE; TO CHANGE QID   NARCAN 4 MG/0.1ML Liquid  at unknown   Yes No   Naproxen Sod-Diphenhydramine (ALEVE PM PO) 2/11/2019 at 2200   Yes No   Sig: Take  by mouth as needed.   ascorbic acid (VITAMIN C) 500 MG tablet 1/30/2019 at unknown   No No   Sig: Take 1 Tab by mouth every morning with breakfast.   calcium carbonate (TUMS) 500 MG Chew Tab 2/11/2019 at 0900   No No   Sig: Take 1 Tab by mouth 2 Times a Day.   cyanocobalamin (VITAMIN B12) 1000 MCG Tab 1/30/2019 at unknown Patient No No   Sig: Take 1 Tab by mouth every day.   docusate sodium 100 MG Cap 2/12/2019 at 2200 Patient No No   Sig: Take 100 mg by mouth 2 Times a Day.   Patient not taking: Reported on 1/5/2019   ferrous sulfate 325 (65 Fe) MG tablet 2/6/2019 at 1600   No No   Sig: Take 1 Tab by mouth every morning with breakfast.   furosemide (LASIX) 40 MG Tab 2/12/2019 at 1400   No No   Sig: Take 1 Tab by mouth every day.   gabapentin (NEURONTIN) 300 MG Cap 2/13/2019 at 2000   No No   Sig: Take 1 Cap by mouth 3 times a day.   methocarbamol (ROBAXIN) 500 MG Tab 2/13/2019 at Unknown time   No No   Sig: Take 2 Tabs by mouth 4 times a day.   morphine ER (MS CONTIN) 30 MG Tab CR tablet 2/13/2019 at Unknown time   Yes No   Sig: Take 30 mg by mouth every 12 hours.   omeprazole (PRILOSEC) 20 MG delayed-release capsule 2/12/2019 at 2200   No No   Sig: Take 1 Cap by mouth every day.   oxyCODONE immediate release (ROXICODONE) 10 MG immediate release tablet 2/13/2019 at Unknown time   Yes No   Sig: Take 10 mg by mouth every four hours as needed for Severe Pain.   potassium chloride SA (KDUR) 20 MEQ Tab CR 2/12/2019 at 1400 Patient No No   Sig: Take 1 Tab  by mouth every day.   vitamin D 5000 units Tab 1/30/2019 at unknown   No No   Sig: Take 5,000 Units by mouth every day.      Facility-Administered Medications: None         Physical Exam  Temp:  [36.3 °C (97.4 °F)-37.2 °C (98.9 °F)] 36.4 °C (97.5 °F)  Pulse:  [] 105  Resp:  [15-20] 17  BP: (123-128)/(57-80) 128/77  SpO2:  [96 %-100 %] 100 %     Physical Exam   Constitutional: She is oriented to person, place, and time. She appears well-developed and well-nourished. No distress.   HENT:   Head: Normocephalic and atraumatic.   Nose: Nose normal.   Mouth/Throat: Oropharynx is clear and moist.   Eyes: Conjunctivae and EOM are normal. Right eye exhibits no discharge. Left eye exhibits no discharge. No scleral icterus.   Neck: Normal range of motion. No tracheal deviation present.   Cardiovascular: Normal rate, regular rhythm and normal heart sounds.    No murmur heard.  Pulses:       Radial pulses are 2+ on the right side, and 2+ on the left side.        Dorsalis pedis pulses are 2+ on the right side, and 2+ on the left side.   Pulmonary/Chest: Effort normal and breath sounds normal. No respiratory distress. She has no wheezes.   Abdominal: Soft. Bowel sounds are normal. She exhibits no distension. There is no tenderness.   Musculoskeletal: She exhibits edema.   Neurological: She is alert and oriented to person, place, and time. No cranial nerve deficit.   Skin: Skin is warm. She is not diaphoretic.   Psychiatric: She has a normal mood and affect. Her behavior is normal. Thought content normal.   Vitals reviewed.        Laboratory:      Recent Labs      02/13/19   1144   WBC  7.7   RBC  4.47   HEMOGLOBIN  11.9*   HEMATOCRIT  34.9*   MCV  78.1*   MCH  26.6*   MCHC  34.1   RDW  48.1   PLATELETCT  347   MPV  9.3          Recent Labs      02/13/19   1144   SODIUM  139   POTASSIUM  3.8   CHLORIDE  103   CO2  29   GLUCOSE  90   BUN  13   CREATININE  0.60   CALCIUM  8.7          Recent Labs      02/13/19   1144   ALTSGPT   7   ASTSGOT  15   ALKPHOSPHAT  127*   TBILIRUBIN  0.4   GLUCOSE  90          Recent Labs      02/13/19   1144   APTT  31.3   INR  1.00              No results for input(s): TROPONINI in the last 72 hours.     Urinalysis:    No results found      Imaging:  DX-PELVIS-1 OR 2 VIEWS   Final Result       Interval left hip arthroplasty when compared to previous exam same day. Air in the soft tissues consistent with history of recent surgery.       DX-HIP-COMPLETE - UNILATERAL 2+ LEFT   Final Result       Left femoral head osteonecrosis and increasing obstruction.   Marked arthritic changes left hip joint.   No acute fracture.       DX-CHEST-PORTABLE (1 VIEW)   Final Result       No pneumothorax.                Assessment/Plan:  I anticipate this patient will require at least two midnights for appropriate medical management, necessitating inpatient admission.         Status post left hip replacement   Assessment & Plan     Wound care  Pain management  S/p left hip replacement by Dr Levine 2/13/19  PT/OT  Patient hopeful for Rehab  DVT prophylaxis  Initial Ice pack to left hip area  Monitor CBC      Obesity (BMI 30-39.9)- (present on admission)   Assessment & Plan     Body mass index is 34.76 kg/m².  Neds discussion on dietary changes      Primary osteoarthritis of left hip- dr levine; left THR tbd feb 6, 2019- (present on admission)   Assessment & Plan     Wound care  Pain management  S/p left hip replacement by Dr Leivne 2/13/19  PT/OT  Patient hopeful for Rehab  DVT prophylaxis  Initial Ice pack to left hip area  Monitor CBC      Essential hypertension- (present on admission)   Assessment & Plan     Monitor vitals      Uncomplicated opioid dependence (CMS-HCC)- kylah adkins- (present on admission)   Assessment & Plan     Pain management for recent left hip surgery      History of gastric bypass- (present on admission)   Assessment & Plan     May need outpatient monitoring of fat soluable vitamins  Watch for liver  "disease            VTE prophylaxis: SCDs        Erendira Harper M.D. Physician Addendum Physical Medicine & Rehab  Consults Date of Service: 2/15/2019 10:11 AM   Consult Orders:   IP CONSULT FOR PHYSIATRY [[id number]] ordered by Deangelo Carrillo D.O. at 02/15/19 0925      Expand All Collapse All    []Hide copied text  []Hover for attribution information  Medical chart review completed.      Patient is a 53 y.o. year-old female with a past medical history significant for Arthritis, urinary incontinence and hx of gastric bypass admitted to [facility] on 2/13/2019 10:59 AM with fall. Per report patient was preparing to have left hip surgery when she fell in the shower.  Per ED report there was no head strike or LOC. Patient was admitted with questionable acetabular fracture.  Patient was taken for left hip arthroplasty with Dr. Levine on 2/13/19.  Patient tolerated procedure well.  Her Hemoglobin has dropped from 11.9 to 7.6 on 2/15/19.  Per report patient is TTWB on left leg for 6 weeks. At baseline patient is on Morphine ER and Oxycodone, currently not on IV pain medications.      Patient lives in a 1 story home with 2 steps to enter; lives alone. Previously requiring intermittent help for physical tasks. Patient was evaluated by PT on 2/14/19 and was maxA for transfers and unable to participate in gait.  Patient has not been evaluated by OT. Patient was previously at [facility] for cervical spine fusion in December 2018.        PMH:  Past Medical History        Past Medical History:   Diagnosis Date   • Anemia     • Arthritis       osteo/hips and back   • At risk for falls     • Chronic back pain     • Dental disorder       lower denture   • Pain 12/04/2018     \"ALL OVER\", 10/10   • Pain 02/04/2019     arthritic pain   • Urinary incontinence       using pads            PSH:  Past Surgical History         Past Surgical History:   Procedure Laterality Date   • HIP ARTHROPLASTY TOTAL Left 2/13/2019     " "Procedure: HIP ARTHROPLASTY TOTAL, Cabling of intra-operative calcar fracture;  Surgeon: Bryon Levine M.D.;  Location: SURGERY [facility];  Service: Orthopedics   • CERVICAL FUSION POSTERIOR   12/7/2018     Procedure: CERVICAL FUSION POSTERIOR- STAGE #2 C3-5 AND C3-T1;  Surgeon: Emre Mendoza III, M.D.;  Location: SURGERY [facility];  Service: Neurosurgery   • CERVICAL LAMINECTOMY POSTERIOR   12/7/2018     Procedure: CERVICAL LAMINECTOMY POSTERIOR C2-T1;  Surgeon: Emre Mendoza III, M.D.;  Location: SURGERY [facility];  Service: Neurosurgery   • CERVICAL DISK AND FUSION ANTERIOR   12/5/2018     Procedure: CERVICAL DISK AND FUSION ANTERIOR-STAGE #1 C4-5;  Surgeon: Emre Mendoza III, M.D.;  Location: [facility];  Service: Neurosurgery   • CORPECTOMY   12/5/2018     Procedure: CORPECTOMY;  Surgeon: Emre Mendoza III, M.D.;  Location: [facility];  Service: Neurosurgery   • HIP ARTHROPLASTY TOTAL Right 3/20/2018     Procedure: HIP ARTHROPLASTY TOTAL;  Surgeon: Bryon Levine M.D.;  Location: [facility];  Service: Orthopedics   • KNEE ARTHROPLASTY TOTAL Right 10/20/2015     Procedure: KNEE ARTHROPLASTY TOTAL;  Surgeon: Bryon Levine M.D.;  Location: [facility];  Service:    • APPENDECTOMY LAPAROSCOPIC   3/21/2013     Performed by Dali Queen M.D. at [facility]   • DEBRIDEMENT   5/17/2012     Performed by BRADLEY DYKES at [facility]   • PLASTIC SURGERY   2004     excess skin on arms and legs removed   • MAMMOPLASTY AUGMENTATION Bilateral 2004     breast implants and \"tummy tuck\"   • GASTRIC BYPASS LAPAROSCOPIC   2002     x 2   • ABDOMINAL EXPLORATION                FAMILY HISTORY:  Family History         Family History   Problem Relation Age of Onset   • Diabetes Mother     • Heart Disease Mother              MEDICATIONS:       Current Facility-Administered Medications   Medication Dose   • vitamin D " (Ergocalciferol) (DRISDOL) capsule 50,000 Units  50,000 Units   • [START ON 2/16/2019] cyanocobalamin (VITAMIN B-12) tablet 1,000 mcg  1,000 mcg   • oxyCODONE immediate release (ROXICODONE) tablet 10 mg  10 mg   • gabapentin (NEURONTIN) capsule 300 mg  300 mg   • methocarbamol (ROBAXIN) tablet 1,000 mg  1,000 mg   • morphine ER (MS CONTIN) tablet 30 mg  30 mg   • omeprazole (PRILOSEC) capsule 20 mg  20 mg   • senna-docusate (PERICOLACE or SENOKOT S) 8.6-50 MG per tablet 2 Tab  2 Tab     And   • polyethylene glycol/lytes (MIRALAX) PACKET 1 Packet  1 Packet     And   • magnesium hydroxide (MILK OF MAGNESIA) suspension 30 mL  30 mL     And   • bisacodyl (DULCOLAX) suppository 10 mg  10 mg   • NS infusion     • ondansetron (ZOFRAN) syringe/vial injection 4 mg  4 mg   • ondansetron (ZOFRAN ODT) dispertab 4 mg  4 mg   • promethazine (PHENERGAN) tablet 12.5-25 mg  12.5-25 mg   • promethazine (PHENERGAN) suppository 12.5-25 mg  12.5-25 mg   • prochlorperazine (COMPAZINE) injection 5-10 mg  5-10 mg   • acetaminophen (TYLENOL) tablet 650 mg  650 mg   • Pharmacy Consult Request ...Pain Management Review 1 Each  1 Each   • acetaminophen (TYLENOL) tablet 1,000 mg  1,000 mg   • celecoxib (CELEBREX) capsule 200 mg  200 mg   • dexamethasone (DECADRON) injection 4 mg  4 mg   • diphenhydrAMINE (BENADRYL) injection 25 mg  25 mg   • haloperidol lactate (HALDOL) injection 1 mg  1 mg   • scopolamine (TRANSDERM-SCOP) patch 1 Patch  1 Patch   • zolpidem (AMBIEN) tablet 5 mg  5 mg   • tramadol (ULTRAM) 50 MG tablet 50 mg  50 mg   • docusate sodium (COLACE) capsule 100 mg  100 mg   • senna-docusate (PERICOLACE or SENOKOT S) 8.6-50 MG per tablet 1 Tab  1 Tab   • senna-docusate (PERICOLACE or SENOKOT S) 8.6-50 MG per tablet 1 Tab  1 Tab   • fleet enema 133 mL  1 Each   • diphenhydrAMINE (BENADRYL) tablet/capsule 25 mg  25 mg     Or   • diphenhydrAMINE (BENADRYL) injection 25 mg  25 mg   • chlorproMAZINE (THORAZINE) tablet 25 mg  25 mg     Or    • chlorproMAZINE (THORAZINE) injection 25 mg  25 mg   • benzocaine-menthol (CEPACOL) lozenge 1 Lozenge  1 Lozenge   • Respiratory Care per Protocol     • aspirin EC (ECOTRIN) tablet 81 mg  81 mg         ALLERGIES:  Patient has no known allergies.     PSYCHOSOCIAL HISTORY:  Living Site:  Home  Living With:  self  Caregiver's availability:  Not Applicable  Number of stairs:  2  Substance use history:  Unknown        The patient presents  with cognitive deficits and functional deficits in mobility/self-cares, and Moderate  de-conditioning. Pre-morbidly, this patient lived in a single level home with Two steps to enter,alone and able to care for self  The patient was evaluated by acute care Physical Therapy and Occupational Therapy; currently requiring maximal assistance for mobility and maximal assistance for ADLs. The patient's current diet is Regular with Thin liquids.      Patient currently s/p L VILMA on 2/13/19. Will need to be evaluated by OT. If patient has at least CGA/Edmond needs for OT then the patient is   a Good candidate for an acute inpatient rehabilitation program with a coordinated program of care at an intensity and frequency not available at a lower level of care.      Note: This recommendation requires that patient has at least CGA/Minimal Assistance needs in at least two therapy disciplines.  If patient progresses to no longer need CGA/Edmond with at least two therapy disciplines they may be more appropriate for Skilled nursing facility versus home with home health.       This recommendation is substantiated by the patient's current medical condition with intervention and assessment of medical issues requiring an acute level of care for patient's safety and maximum outcome. A coordinated program of care will be provided by an interdisciplinary team including physical therapy, occupational therapy, physiatry, rehab nursing and rehab psychology. Rehab goals include improved  mobility, self-care  management, strength and conditioning/endurance, pain management, bowel and bladder management, mood and affect, and safety with independent home management including caregiver training. Estimated length of stay is approximately 10-14 days. Rehab potential: Good. Disposition: to pre-morbid independent living setting with supportive care of patient's family and community resources. We will continue to follow with you in anticipation of discharge to acute inpatient rehabilitation when medically stable to do so at the discretion of the attending physician. Thank you for allowing us to participate in this patient's care. Please call with any questions regarding this recommendation.     Erendira Harper M.D.     Addendum: Patient evaluated by OT but without a note in the chart. Per flowsheet patient is min-maxA for ADLs. Patient currently meets criteria. Will ask team to submit for auth.                 DATE OF SERVICE:  02/13/2019     PREOPERATIVE DIAGNOSES:  1.  Severe degenerative arthritis, left hip.  2.  Acute femoral head collapse.     POSTOPERATIVE DIAGNOSES:  1.  Severe degenerative arthritis, left hip.  2.  Acute femoral head collapse.  3.  Intraoperative calcar crack.     PROCEDURE:  Left total hip arthroplasty with cabling of calcar.     SURGEON:  Bryon Levine MD     ANESTHESIA:  General.     ANESTHESIOLOGIST:  Elliot Torres MD     ASSISTANT:  Carlito Huber MD     ESTIMATED BLOOD LOSS:  250 mL.     COMPONENTS PLACED:  Smith and Nephew size 50 R3 acetabulum with neutral 32 mm   polyethylene liner, Smith and Nephew size 3 standard offset polar stem with a   32 mm +8 head and 2 Sumner cables in the intertrochanteric region.     FINDINGS:  Severe osteoarthritis, collapse of femoral head, hemarthrosis.     COMPLICATIONS:  Calcar fracture.     SUMMARY:  Patient was brought to the operating room and anesthesia was   administered.  Antibiotics and tranexamic acid were given IV.  Leg lengths   were  "measured.  She was at least a centimeter short on the left.  She was   placed in the right lateral decubitus position on a well-padded pegboard with   an axillary roll placed.  The left hip and leg prepped and draped in usual   sterile manner.  Leg lengths were measured again in that position and a   timeout was called.    Co-morbidities: See PMH  Potential Risk - Complications: Contractures, Deep Vein Thrombosis, Incontinence, Malnutrition, Pain, Pneumonia, Pressure Ulcer and Urinary Tract Infection  Level of Risk: High    Ongoing Medical Management Needed (Medical/Nursing Needs):   Patient Active Problem List    Diagnosis Date Noted   • Mild intermittent asthma without complication 04/25/2012     Priority: High   • Status post left hip replacement 02/13/2019   • Primary insomnia 01/03/2019   • Mixed stress and urge urinary incontinence 01/03/2019   • Edema 12/13/2018   • Vitamin D deficiency 12/13/2018   • Anemia 12/13/2018   • Cervical myelopathy (HCC) 12/11/2018   • Encounter for work capability assessment- FMLA PAPERWORK 08/29/2018   • DDD (degenerative disc disease), cervical- MOD-SEVERE SPINE NV- dr brennan; fusion and laminectomy C3-T1 12/5/2018 dr brennan 08/09/2018   • Primary osteoarthritis of left hip- dr nowak; left THR tbd feb 6, 2019 06/07/2018   • Obesity (BMI 30-39.9) 06/07/2018   • DDD (degenerative disc disease), lumbar 04/25/2018   • Uncomplicated opioid dependence (CMS-HCC)- kylah adkins 01/31/2018   • Essential hypertension 01/31/2018   • Venous insufficiency 10/03/2017   • Colon cancer screening- needs 08/09/2017   • Chronic bilateral low back pain with bilateral sciatica- pain mgt; KYLAH ADKINS 07/26/2017   • Vitamin B 12 deficiency 06/02/2016   • History of gastric bypass 04/25/2012     A & O    Current Vital Signs:   Temperature: 36.8 °C (98.3 °F) Pulse: 99 Respiration: 12 Blood Pressure: (!) 73/48  Weight: 89 kg (196 lb 3.4 oz) Height: 160 cm (5' 3\")  Pulse Oximetry: 97 % O2 (LPM): 1  "     Completed Laboratory Reports:  Recent Labs      19   1144  19   0610  02/15/19   0330   WBC  7.7  14.2*  8.1   HEMOGLOBIN  11.9*  11.2*  7.6*   HEMATOCRIT  34.9*  32.5*  22.6*   PLATELETCT  347  363  245   SODIUM  139  138  140   POTASSIUM  3.8  4.2  3.7   BUN  13  11  13   CREATININE  0.60  0.61  0.45*   ALBUMIN  3.0*   --   2.3*   GLUCOSE  90  121*  98   INR  1.00   --    --      Additional Labs: Not Applicable    Prior Living Situation:   Housing / Facility: 1 Story Apartment / Condo  Steps Into Home: 2  Steps In Home: 0  Lives with - Patient's Self Care Capacity: Significant Other  Equipment Owned: Wheelchair, Front-Wheel Walker    Prior Level of Function / Living Situation:   Physical Therapy: Prior Services: Continuous (24 Hour) Care Giving Family  Housing / Facility: 1 Story Apartment / Condo  Steps Into Home: 2  Steps In Home: 0  Bathroom Set up: Bathtub / Shower Combination  Equipment Owned: Wheelchair, Front-Wheel Walker  Lives with - Patient's Self Care Capacity: Significant Other  Bed Mobility: Required Assist  Transfer Status: Required Assist  Ambulation: Dependent  Distance Ambulation (Feet):  (w/c bound)  Assistive Devices Used: Wheelchair  Wheelchair: Independent  Current Level of Function:   Level Of Assist: Unable to Participate  Assistive Device: Front Wheel Walker  Weight Bearing Status: TTWB L LE  Supine to Sit: Maximal Assist  Sit to Supine: Moderate Assist  Scooting: Maximal Assist  Rolling: Maximum Assist to Lt., Maximal Assist to Rt.  Sit to Stand: Maximal Assist  Bed, Chair, Wheelchair Transfer: Maximal Assist  Toilet Transfers: Maximal Assist  Transfer Method: Stand Pivot (using FWW with cues for TTWB)  Sitting Edge of Bed: 30 min  Standin sec x 4  Occupational Therapy:   Self Feeding: Independent  Grooming / Hygiene: Requires Assist  Bathing: Requires Assist  Dressing: Requires Assist  Toileting: Requires Assist  Medication Management: Independent  Laundry: Requires  Assist  Kitchen Mobility: Requires Assist  Finances: Requires Assist  Home Management: Requires Assist  Shopping: Requires Assist  Prior Level Of Mobility: Supervision With Device in Home, Supervision With Device in Community  Driving / Transportation: Relatives / Others Provide Transportation  Prior Services: Continuous (24 Hour) Care Giving Family  Housing / Facility: 1 Story Apartment / Condo  Current Level of Function:   Eating: Supervision  Bathing: Maximal Assist  Upper Body Dressing: Minimal Assist  Lower Body Dressing: Maximal Assist  Toileting: Maximal Assist  Speech Language Pathology:      Rehabilitation Prognosis/Potential: Good  Estimated Length of Stay: 10-14 days    Nursing:   Orientation : Oriented x 4  Continent    Scope/Intensity of Services Recommended:  Physical Therapy: 1.5 hr / day  5 days / week. Therapeutic Interventions Required: Maximize Endurance, Mobility, Strength and Safety  Occupational Therapy: 1.5 hr / day 5 days / week. Therapeutic Interventions Required: Maximize Self Care, ADLs, IADLs and Energy Conservation  Rehabilitation Nursin/7. Therapeutic Interventions Required: Monitor Pain, Skin, Wound(s), Vital Signs, Intake and Output, Labs, Safety and Family Training  Rehabilitation Physician: 3 - 5 days / week. Therapeutic Interventions Required: Medical Management  Respiratory Care: Pulmonary Toileting. Therapeutic Interventions Required: Pulmonary Toileting and O2 Weaning    Rehabilitation Goals and Plan (Expected frequency & duration of treatment in the IRF):   Return to the Community, Modified Independent Level of Care and Outpatient Support  Anticipated Date of Rehabilitation Admission: 02-15-19  Patient/Family oriented IRF level of care/facility/plan: Yes  Patient/Family willing to participate in IRF care/facility/plan: Yes  Patient able to tolerate IRF level of care proposed: Yes  Patient has potential to benefit IRF level of care proposed: Yes  Comments: Not  Applicable    Special Needs or Precautions - Medical Necessity:  Safety Concerns/Precautions:  Fall Risk / High Risk for Falls, Balance and Bed / Chair Alarm  Complex Wound Care: Surgical  Pain Management  IV Site: Peripheral  Requires Oxygen  TTWB LLE x6 weeks  Current Medications:    Current Facility-Administered Medications Ordered in Epic   Medication Dose Route Frequency Provider Last Rate Last Dose   • vitamin D (Ergocalciferol) (DRISDOL) capsule 50,000 Units  50,000 Units Oral Q7 DAYS Ramu White M.D.   50,000 Units at 02/15/19 0837   • [START ON 2/16/2019] cyanocobalamin (VITAMIN B-12) tablet 1,000 mcg  1,000 mcg Oral DAILY Ramu White M.D.       • oxyCODONE immediate release (ROXICODONE) tablet 10 mg  10 mg Oral Q3HRS PRN Ramu White M.D.   10 mg at 02/15/19 0842   • gabapentin (NEURONTIN) capsule 300 mg  300 mg Oral TID SHEFALI Carrasquillo.O.   300 mg at 02/15/19 0456   • methocarbamol (ROBAXIN) tablet 1,000 mg  1,000 mg Oral 4X/DAY Deangelo Carrillo D.O.   1,000 mg at 02/15/19 0837   • morphine ER (MS CONTIN) tablet 30 mg  30 mg Oral Q12HRS SHEFALI Carrasquillo.O.   30 mg at 02/15/19 0455   • omeprazole (PRILOSEC) capsule 20 mg  20 mg Oral DAILY SHEFALI Carrasquillo.O.   20 mg at 02/15/19 0455   • senna-docusate (PERICOLACE or SENOKOT S) 8.6-50 MG per tablet 2 Tab  2 Tab Oral BID SHEFALI Carrasquillo.O.   2 Tab at 02/15/19 0456    And   • polyethylene glycol/lytes (MIRALAX) PACKET 1 Packet  1 Packet Oral QDAY PRN Deangelo Carrillo D.O.        And   • magnesium hydroxide (MILK OF MAGNESIA) suspension 30 mL  30 mL Oral QDAY PRN SOLA CarrasquilloODustin        And   • bisacodyl (DULCOLAX) suppository 10 mg  10 mg Rectal QDAY PRN SOLA CarrasquilloODustin       • NS infusion   Intravenous Continuous SOLA CarrasquilloO. 75 mL/hr at 02/15/19 0340     • ondansetron (ZOFRAN) syringe/vial injection 4 mg  4 mg Intravenous Q4HRS PRN Deangelo Carrillo D.O.       • ondansetron (ZOFRAN ODT) dispertab 4 mg  4 mg Oral Q4HRS PRN Deangelo Carrillo,  D.O.       • promethazine (PHENERGAN) tablet 12.5-25 mg  12.5-25 mg Oral Q4HRS PRN SOLA CarrasquilloO.       • promethazine (PHENERGAN) suppository 12.5-25 mg  12.5-25 mg Rectal Q4HRS PRN SHEFALI Carrasquillo.O.       • prochlorperazine (COMPAZINE) injection 5-10 mg  5-10 mg Intravenous Q4HRS PRN SHEFALI Carrasquillo.O.       • acetaminophen (TYLENOL) tablet 650 mg  650 mg Oral Q6HRS PRN SHEFALI Carrasquillo.O.       • Pharmacy Consult Request ...Pain Management Review 1 Each  1 Each Other PHARMACY TO DOSE Carlito Huber M.D.       • acetaminophen (TYLENOL) tablet 1,000 mg  1,000 mg Oral Q6HRS aCrlito Huber M.D.   1,000 mg at 02/15/19 0837   • celecoxib (CELEBREX) capsule 200 mg  200 mg Oral BID WITH MEALS Carlito Huber M.D.   200 mg at 02/15/19 0837   • dexamethasone (DECADRON) injection 4 mg  4 mg Intravenous Once PRN Carlito Huber M.D.       • diphenhydrAMINE (BENADRYL) injection 25 mg  25 mg Intravenous Q6HRS PRN Carlito Huber M.D.       • haloperidol lactate (HALDOL) injection 1 mg  1 mg Intravenous Q6HRS PRN Carlito Huber M.D.       • scopolamine (TRANSDERM-SCOP) patch 1 Patch  1 Patch Transdermal Q72HRS PRN Carlito Huber M.D.       • zolpidem (AMBIEN) tablet 5 mg  5 mg Oral HS PRN Carlito Huber M.D.       • tramadol (ULTRAM) 50 MG tablet 50 mg  50 mg Oral Q4HRS PRN Carlito Huber M.D.   50 mg at 02/14/19 1211   • docusate sodium (COLACE) capsule 100 mg  100 mg Oral BID Carlito Huber M.D.   100 mg at 02/15/19 0455   • senna-docusate (PERICOLACE or SENOKOT S) 8.6-50 MG per tablet 1 Tab  1 Tab Oral Nightly Carlito Huber M.D.   1 Tab at 02/14/19 2009   • senna-docusate (PERICOLACE or SENOKOT S) 8.6-50 MG per tablet 1 Tab  1 Tab Oral Q24HRS PRN Carlito Huber M.D.       • fleet enema 133 mL  1 Each Rectal Once PRN Carlito Huber M.D.       • diphenhydrAMINE (BENADRYL) tablet/capsule 25 mg  25 mg Oral Q6HRS PRN Carlito Huber M.D.         Or   • diphenhydrAMINE (BENADRYL) injection 25 mg  25 mg Intravenous Q6HRS PRN Carlito Huber M.D.       • chlorproMAZINE (THORAZINE) tablet 25 mg  25 mg Oral Q6HRS PRN Carlito Huber M.D.        Or   • chlorproMAZINE (THORAZINE) injection 25 mg  25 mg Intramuscular Q6HRS PRN Carlito Huber M.D.       • benzocaine-menthol (CEPACOL) lozenge 1 Lozenge  1 Lozenge Mouth/Throat Q2HRS PRN Carlito Huber M.D.       • Respiratory Care per Protocol   Nebulization Continuous RT Carlito Huber M.D.       • aspirin EC (ECOTRIN) tablet 81 mg  81 mg Oral BID Carlito Huber M.D.   81 mg at 02/15/19 0456     No current Epic-ordered outpatient prescriptions on file.     Diet:   DIET ORDERS (Through next 24h)    Start     Ordered    02/13/19 1950  Diet Order Regular  ALL MEALS     Question:  Diet:  Answer:  Regular    02/13/19 1949          Anticipated Discharge Destination / Patient/Family Goal:  Destination: Home with Assistance Support System: S. O.  Anticipated home health services: OT and PT  Previously used HH service/ provider: Not Applicable  Anticipated DME Needs: [employer]  Outpatient Services: OT and PT  Alternative resources to address additional identified needs:     Pre-Screen Completed: 2/15/2019 11:16 AM Luc Ravi L.P.N.

## 2019-02-15 NOTE — CONSULTS
"Medical chart review completed.     Patient is a 53 y.o. year-old female with a past medical history significant for Arthritis, urinary incontinence and hx of gastric bypass admitted to [facility] on 2/13/2019 10:59 AM with fall. Per report patient was preparing to have left hip surgery when she fell in the shower.  Per ED report there was no head strike or LOC. Patient was admitted with questionable acetabular fracture.  Patient was taken for left hip arthroplasty with Dr. Levine on 2/13/19.  Patient tolerated procedure well.  Her Hemoglobin has dropped from 11.9 to 7.6 on 2/15/19.  Per report patient is TTWB on left leg for 6 weeks. At baseline patient is on Morphine ER and Oxycodone, currently not on IV pain medications.     Patient lives in a 1 story home with 2 steps to enter; lives alone. Previously requiring intermittent help for physical tasks. Patient was evaluated by PT on 2/14/19 and was maxA for transfers and unable to participate in gait.  Patient has not been evaluated by OT. Patient was previously at [facility] for cervical spine fusion in December 2018.      PMH:  Past Medical History:   Diagnosis Date   • Anemia    • Arthritis     osteo/hips and back   • At risk for falls    • Chronic back pain    • Dental disorder     lower denture   • Pain 12/04/2018    \"ALL OVER\", 10/10   • Pain 02/04/2019    arthritic pain   • Urinary incontinence     using pads       PSH:  Past Surgical History:   Procedure Laterality Date   • HIP ARTHROPLASTY TOTAL Left 2/13/2019    Procedure: HIP ARTHROPLASTY TOTAL, Cabling of intra-operative calcar fracture;  Surgeon: Bryon Levine M.D.;  Location: SURGERY [facility];  Service: Orthopedics   • CERVICAL FUSION POSTERIOR  12/7/2018    Procedure: CERVICAL FUSION POSTERIOR- STAGE #2 C3-5 AND C3-T1;  Surgeon: Emre Mendoza III, M.D.;  Location: SURGERY [facility];  Service: Neurosurgery   • CERVICAL LAMINECTOMY POSTERIOR  12/7/2018    Procedure: CERVICAL " "LAMINECTOMY POSTERIOR C2-T1;  Surgeon: Emre Mendoza III, M.D.;  Location: SURGERY [facility];  Service: Neurosurgery   • CERVICAL DISK AND FUSION ANTERIOR  12/5/2018    Procedure: CERVICAL DISK AND FUSION ANTERIOR-STAGE #1 C4-5;  Surgeon: Emre Mendoza III, M.D.;  Location: SURGERY [facility];  Service: Neurosurgery   • CORPECTOMY  12/5/2018    Procedure: CORPECTOMY;  Surgeon: Emre Mendoza III, M.D.;  Location: SURGERY [facility];  Service: Neurosurgery   • HIP ARTHROPLASTY TOTAL Right 3/20/2018    Procedure: HIP ARTHROPLASTY TOTAL;  Surgeon: Bryon Levine M.D.;  Location: SURGERY [facility];  Service: Orthopedics   • KNEE ARTHROPLASTY TOTAL Right 10/20/2015    Procedure: KNEE ARTHROPLASTY TOTAL;  Surgeon: Bryon Levine M.D.;  Location: SURGERY [facility];  Service:    • APPENDECTOMY LAPAROSCOPIC  3/21/2013    Performed by Dali Queen M.D. at [facility]   • DEBRIDEMENT  5/17/2012    Performed by BRADLEY DYKES at SURGERY [facility]   • PLASTIC SURGERY  2004    excess skin on arms and legs removed   • MAMMOPLASTY AUGMENTATION Bilateral 2004    breast implants and \"tummy tuck\"   • GASTRIC BYPASS LAPAROSCOPIC  2002    x 2   • ABDOMINAL EXPLORATION         FAMILY HISTORY:  Family History   Problem Relation Age of Onset   • Diabetes Mother    • Heart Disease Mother        MEDICATIONS:  Current Facility-Administered Medications   Medication Dose   • vitamin D (Ergocalciferol) (DRISDOL) capsule 50,000 Units  50,000 Units   • [START ON 2/16/2019] cyanocobalamin (VITAMIN B-12) tablet 1,000 mcg  1,000 mcg   • oxyCODONE immediate release (ROXICODONE) tablet 10 mg  10 mg   • gabapentin (NEURONTIN) capsule 300 mg  300 mg   • methocarbamol (ROBAXIN) tablet 1,000 mg  1,000 mg   • morphine ER (MS CONTIN) tablet 30 mg  30 mg   • omeprazole (PRILOSEC) capsule 20 mg  20 mg   • senna-docusate (PERICOLACE or SENOKOT S) 8.6-50 MG per tablet 2 Tab  2 Tab    And   • polyethylene " glycol/lytes (MIRALAX) PACKET 1 Packet  1 Packet    And   • magnesium hydroxide (MILK OF MAGNESIA) suspension 30 mL  30 mL    And   • bisacodyl (DULCOLAX) suppository 10 mg  10 mg   • NS infusion     • ondansetron (ZOFRAN) syringe/vial injection 4 mg  4 mg   • ondansetron (ZOFRAN ODT) dispertab 4 mg  4 mg   • promethazine (PHENERGAN) tablet 12.5-25 mg  12.5-25 mg   • promethazine (PHENERGAN) suppository 12.5-25 mg  12.5-25 mg   • prochlorperazine (COMPAZINE) injection 5-10 mg  5-10 mg   • acetaminophen (TYLENOL) tablet 650 mg  650 mg   • Pharmacy Consult Request ...Pain Management Review 1 Each  1 Each   • acetaminophen (TYLENOL) tablet 1,000 mg  1,000 mg   • celecoxib (CELEBREX) capsule 200 mg  200 mg   • dexamethasone (DECADRON) injection 4 mg  4 mg   • diphenhydrAMINE (BENADRYL) injection 25 mg  25 mg   • haloperidol lactate (HALDOL) injection 1 mg  1 mg   • scopolamine (TRANSDERM-SCOP) patch 1 Patch  1 Patch   • zolpidem (AMBIEN) tablet 5 mg  5 mg   • tramadol (ULTRAM) 50 MG tablet 50 mg  50 mg   • docusate sodium (COLACE) capsule 100 mg  100 mg   • senna-docusate (PERICOLACE or SENOKOT S) 8.6-50 MG per tablet 1 Tab  1 Tab   • senna-docusate (PERICOLACE or SENOKOT S) 8.6-50 MG per tablet 1 Tab  1 Tab   • fleet enema 133 mL  1 Each   • diphenhydrAMINE (BENADRYL) tablet/capsule 25 mg  25 mg    Or   • diphenhydrAMINE (BENADRYL) injection 25 mg  25 mg   • chlorproMAZINE (THORAZINE) tablet 25 mg  25 mg    Or   • chlorproMAZINE (THORAZINE) injection 25 mg  25 mg   • benzocaine-menthol (CEPACOL) lozenge 1 Lozenge  1 Lozenge   • Respiratory Care per Protocol     • aspirin EC (ECOTRIN) tablet 81 mg  81 mg       ALLERGIES:  Patient has no known allergies.    PSYCHOSOCIAL HISTORY:  Living Site:  Home  Living With:  self  Caregiver's availability:  Not Applicable  Number of stairs:  2  Substance use history:  Unknown      The patient presents  with cognitive deficits and functional deficits in mobility/self-cares, and  Moderate  de-conditioning. Pre-morbidly, this patient lived in a single level home with Two steps to enter,alone and able to care for self  The patient was evaluated by acute care Physical Therapy and Occupational Therapy; currently requiring maximal assistance for mobility and maximal assistance for ADLs. The patient's current diet is Regular with Thin liquids.     Patient currently s/p L VILMA on 2/13/19. Will need to be evaluated by OT. If patient has at least CGA/Edmond needs for OT then the patient is   a Good candidate for an acute inpatient rehabilitation program with a coordinated program of care at an intensity and frequency not available at a lower level of care.     Note: This recommendation requires that patient has at least CGA/Minimal Assistance needs in at least two therapy disciplines.  If patient progresses to no longer need CGA/Edmond with at least two therapy disciplines they may be more appropriate for Skilled nursing facility versus home with home health.      This recommendation is substantiated by the patient's current medical condition with intervention and assessment of medical issues requiring an acute level of care for patient's safety and maximum outcome. A coordinated program of care will be provided by an interdisciplinary team including physical therapy, occupational therapy, physiatry, rehab nursing and rehab psychology. Rehab goals include improved  mobility, self-care management, strength and conditioning/endurance, pain management, bowel and bladder management, mood and affect, and safety with independent home management including caregiver training. Estimated length of stay is approximately 10-14 days. Rehab potential: Good. Disposition: to pre-morbid independent living setting with supportive care of patient's family and community resources. We will continue to follow with you in anticipation of discharge to acute inpatient rehabilitation when medically stable to do so at the  discretion of the attending physician. Thank you for allowing us to participate in this patient's care. Please call with any questions regarding this recommendation.    Erendira Harper M.D.    Addendum: Patient evaluated by OT but without a note in the chart. Per flowsheet patient is min-maxA for ADLs. Patient currently meets criteria. Will ask team to submit for auth.

## 2019-02-15 NOTE — PROGRESS NOTES
Patient discharged to [facility] rehab. Report called to Kimberlyn. All belongings sent with patient.

## 2019-02-15 NOTE — DISCHARGE INSTRUCTIONS
Discharge Instructions    *Follow up with Dr. Levine at scheduled appointment  *Weight bearing: Toe Touch Weight Bearing                 *Activity as tolerated  *Use assistive device for all activity  *Continue exercises provided by physical therapy  *Elevate leg as needed  *Ice as needed (20 minutes every 1-2 hours)  *Keep dressing in place until follow up with doctor  *Ok to shower with waterproof dressing in place  *No soaking of the incision; no baths, hot tubs, or swimming until cleared by doctor  * Aspirin 81 mg Twice a day for blood clot prevention          *Take medications as prescribed by doctor  *Call doctor’s office with any questions or concerns     Discharged to other by [facility] with escort. Discharged via wheelchair, hospital escort: Yes.  Special equipment needed: Walker and Wheelchair    Be sure to schedule a follow-up appointment with your primary care doctor or any specialists as instructed.     Discharge Plan:   Diet Plan: Discussed  Activity Level: Discussed  Confirmed Follow up Appointment: Patient to Call and Schedule Appointment  Confirmed Symptoms Management: Discussed  Medication Reconciliation Updated: Yes  Influenza Vaccine Indication: Patient Refuses (vaccination received in 2018)    I understand that a diet low in cholesterol, fat, and sodium is recommended for good health. Unless I have been given specific instructions below for another diet, I accept this instruction as my diet prescription.   Other diet: Regular Diet    Special Instructions: Discharge instructions for the Orthopedic Patient    Follow up with Primary Care Physician within 2 weeks of discharge to home, regarding:  Review of medications and diagnostic testing.  Surveillance for medical complications.  Workup and treatment of osteoporosis, if appropriate.     -Is this a Joint Replacement patient? Yes   Total Joint Hip Replacement Discharge Instructions    Pain  - The goal is to slowly wean off the prescription pain  medicine.  - Ice can be used for pain control.  20 minutes at a time is recommended, and never directly against your skin or incision.  - Most patients are off the pain pills by 3 weeks; others may require a low level of pain medications for many months. If your pain continues to be severe, follow up with your physician.  Infection  Deep hip joint infections that require removal of the prostheses occur in less than 0.1% of patients. Lesser infections in the skin (cellulites) are more common and much more easily treated.  - Keep the incision as clean and dry as possible.  - Always wash your hands before touching your incision.  - Skin infections tend to develop around 7-10 days after surgery, most can be treated with oral antibiotics.  - Dental Care should be delayed for 3 months after surgery, your surgeon recommends taking a dose of antibiotics 1 hour prior to any dental procedure.  After 2 years, most surgeons recommend antibiotics only before an extensive procedure.  Ask your surgeon what he recommends.  - Signs and symptoms of infection can include:  low grade fever, redness, pain, swelling and drainage from your incision.  Notify your surgeon immediately if you develop any of these symptoms.  Post op Disturbances  - Bowel habits - constipation is extremely common and is caused by a combination of anesthesia, lack of mobility and pain medicine.  Use stool softeners or laxatives if necessary. It is important not to ignore this problem, as bowel obstructions can be a serious complication after joint replacement surgery.  - Mood/Energy Level - Many patients experience a lack of energy and endurance for up to 2-3 months after surgery.  Some may also feel down and can even become depressed.  This is likely due to the postoperative anemia, change in activity level, lack of sleep, pain medicine and just the emotional reaction to the surgery itself that is a big disruption in a person’s life.  This usually passes.  If  symptoms persist, follow up with your primary physician.  - Returning to work - Your surgeon will give you more specific instructions.  Generally, if you work a sedentary job requiring little standing or walking, most patients may return within 2-6 weeks.  Manual labor jobs involving walking, lifting and standing may take 3-4 months.  Your surgeon’s office can provide a release to part-time or light duty work early on in your recovery and progress you to full duty as able.  - Driving - You can begin driving an automatic shift car in 4 to 8 weeks, provided you are no longer taking narcotic pain medication. If you have a stick-shift car and your right hip was replaced, do not begin driving until your doctor says you can.   - Avoiding falls -  throw rugs and tack down loose carpeting.  Be aware of floor hazards such as pets, small objects or uneven surfaces.   -  Airport Metal Detectors - The sensitivity of metal detectors varies and it is likely that your prosthesis will cause an alarm. Inform the  that you have an artificial joint.  Diet  - Resume your normal diet as tolerated.  - It is important to achieve a healthy nutritional status by eating a well balanced diet on a regular basis.  - Your physician may recommend that you take iron and vitamin supplements.   - Continue to drink plenty of fluids.  Shower/Bathing  - You may shower as soon as you get home from the hospital unless otherwise instructed.  - Keep your incision out of water.  To keep the incision dry when showering, cover it with a plastic bag or plastic wrap.  - Pat incision dry if it gets wet.  Don’t rub.  - Do not submerge in a bath until staples are out and the incision is completely healed. (Approximately 6-8 weeks after surgery).  Dressing Change:  Procedure (if recommended by your physician)  - Wash hands.  - Open all dressing change materials.  - Remove old dressing and discard.  - Inspect incision for redness, increase in  clear drainage, yellow/green drainage, odor and surrounding skin hot to touch.  -  ABD (large gauze) pad by one corner and lay over the incision.  Be careful not to touch the inside of the dressing that will lay over the incision.  - Secure in place as instructed (Ace wrap or tape).    Swelling/Bruising  - Swelling is normal after hip replacement and can involve the thigh, knee, calf and foot.  - Swelling can last from 3-6 months.  - Elevate your leg higher than your heart while reclining.  The first week you are home you should elevate your leg an equal amount of time, as you are active.    - Anti-inflammatory pills can be taken once you have stopped the blood thinners.  - The swelling is usually worse after you go home since you are upright for longer periods of time.  - Bruising is common and can involve the entire leg including the thigh, calf and even foot.  Bruising often does not appear until after you arrive home and it can be quite dramatic- purple, black, green.  The bruising you can see is not usually concerning and will subside without any treatment.      Blood Clot Prevention  Blood clots in the legs and the less common, but frightening, clots that travel to the lungs are a real focus of our preventative. Most patients are at standard risk for them, but those patients who are at higher risk include people who have had previous clots, a family history of clotting, smoking, diabetes, obesity, advanced age, use of estrogen and a sedentary lifestyle.    - Signs of blood clots in legs - Swelling in thigh, calf or ankle that does not go down with elevation.  Pain, heat and tenderness in calf, back of calf or groin area.  NOTE: blood clots can occur in either leg.  - You have been receiving anticoagulant therapy (blood thinners) in the hospital and you may be instructed to continue at home depending on your risk factors.  - Your risk for developing a clot continues for up to 2-3 months after surgery.   You should avoid prolonged sitting and dehydration during that time (long air trips and car trips).  If you do take a trip during this time, please get up and move around every 1- 1.5 hours.  - If you are prescribed blood thinning medication for home, follow instructions as directed. (Handouts provided if applicable).      Activity    Once you get home, you should stay active. The key is not to overdo it! While you can expect some good days and some bad days, you should notice a gradual improvement over time you should notice a gradual improvement and a gradual increase in your endurance over the next 6 to 12 months.    - Weight Bearing - If you have undergone cemented or hybrid hip replacement, you can put some weight on the leg immediately using a cane or walker, and you should continue to use some support for 4 to 6 weeks to help the muscles recover.   - Sleeping Positions - Sleep on your back with your legs slightly apart or on your side with a regular pillow between your knees. Be sure to use the pillow for at least 6 weeks, or until your doctor says you can do without it. Sleeping on your stomach should be all right  - Sitting - For at least the first 3 months, sit only in chairs that have arms. Do not sit on low chairs, low stools, or reclining chairs. Do not cross your legs at the knees. The physical therapist will show you how to sit and stand from a chair, keeping your affected leg out in front of you. Get up and move around on a regular basis--at least once every hour.  - Walking - Walk as much as you like once your doctor gives you the go-ahead, but remember that walking is no substitute for your prescribed exercises. Walking with a pair of trekking poles is helpful and adds as much as 40% to the exercise you get when you walk  - Therapy may be needed in some cases, to strengthen your muscles and improve your gait (walking pattern).  This decision will be made at your post-operative appointment.  Follow  your therapist recommended post-operative exercises (handout provided by Therapist).  - Swimming is also recommended; you can begin as soon as the sutures have been removed and the wound is healed, approximately 6 to 8 weeks after surgery. Using a pair of training fins may make swimming a more enjoyable and effective exercise.  - Other activities - Lower impact activities are preferred.  If you have specific questions, consult your Surgeon.    - Sexual activity - Your surgeon can tell you when it should be safe to resume sexual activity.      When to Call the Doctor   Call the physician if:   - Fever over 100.5? F  - Increased pain, drainage, redness, odor or heat around the incision area  - Shaking chills  - Increased knee pain with activity and rest  - Increased pain in calf, tenderness or redness above or below the knee  - Increased swelling of calf, ankle, foot  - Sudden increased shortness of breath, sudden onset of chest pain, localized chest pain with coughing  - Incision opening  Or, if there are any questions or concerns about medications or care.       -Is this patient being discharged with medication to prevent blood clots?  Yes, Aspirin Aspirin, ASA oral tablets  What is this medicine?  ASPIRIN (AS pir in) is a pain reliever. It is used to treat mild pain and fever. This medicine is also used as directed by a doctor to prevent and to treat heart attacks, to prevent strokes, and to treat arthritis or inflammation.  This medicine may be used for other purposes; ask your health care provider or pharmacist if you have questions.  COMMON BRAND NAME(S): Aspir-Low, Aspir-Karolina, Aspirtab, Yin Advanced Aspirin, Yin Aspirin, Yin Aspirin Extra Strength, Yin Aspirin Plus, Yin Extra Strength, Yin Extra Strength Plus, Yin Genuine Aspirin, Yin Womens Aspirin, Bufferin, Bufferin Extra Strength, Bufferin Low Dose  What should I tell my health care provider before I take this medicine?  They need to know if  you have any of these conditions:  -anemia  -asthma  -bleeding problems  -child with chickenpox, the flu, or other viral infection  -diabetes  -gout  -if you frequently drink alcohol containing drinks  -kidney disease  -liver disease  -low level of vitamin K  -lupus  -smoke tobacco  -stomach ulcers or other problems  -an unusual or allergic reaction to aspirin, tartrazine dye, other medicines, dyes, or preservatives  -pregnant or trying to get pregnant  -breast-feeding  How should I use this medicine?  Take this medicine by mouth with a glass of water. Follow the directions on the package or prescription label. You can take this medicine with or without food. If it upsets your stomach, take it with food. Do not take your medicine more often than directed.  Talk to your pediatrician regarding the use of this medicine in children. While this drug may be prescribed for children as young as 12 years of age for selected conditions, precautions do apply. Children and teenagers should not use this medicine to treat chicken pox or flu symptoms unless directed by a doctor.  Patients over 65 years old may have a stronger reaction and need a smaller dose.  Overdosage: If you think you have taken too much of this medicine contact a poison control center or emergency room at once.  NOTE: This medicine is only for you. Do not share this medicine with others.  What if I miss a dose?  If you are taking this medicine on a regular schedule and miss a dose, take it as soon as you can. If it is almost time for your next dose, take only that dose. Do not take double or extra doses.  What may interact with this medicine?  Do not take this medicine with any of the following medications:  -cidofovir  -ketorolac  -probenecid  This medicine may also interact with the following medications:  -alcohol  -alendronate  -bismuth subsalicylate  -flavocoxid  -herbal supplements like feverfew, garlic, haider, ginkgo biloba, horse chestnut  -medicines  for diabetes or glaucoma like acetazolamide, methazolamide  -medicines for gout  -medicines that treat or prevent blood clots like enoxaparin, heparin, ticlopidine, warfarin  -other aspirin and aspirin-like medicines  -NSAIDs, medicines for pain and inflammation, like ibuprofen or naproxen  -pemetrexed  -sulfinpyrazone  -varicella live vaccine  This list may not describe all possible interactions. Give your health care provider a list of all the medicines, herbs, non-prescription drugs, or dietary supplements you use. Also tell them if you smoke, drink alcohol, or use illegal drugs. Some items may interact with your medicine.  What should I watch for while using this medicine?  If you are treating yourself for pain, tell your doctor or health care professional if the pain lasts more than 10 days, if it gets worse, or if there is a new or different kind of pain. Tell your doctor if you see redness or swelling. Also, check with your doctor if you have a fever that lasts for more than 3 days. Only take this medicine to prevent heart attacks or blood clotting if prescribed by your doctor or health care professional.  Do not take aspirin or aspirin-like medicines with this medicine. Too much aspirin can be dangerous. Always read the labels carefully.  This medicine can irritate your stomach or cause bleeding problems. Do not smoke cigarettes or drink alcohol while taking this medicine. Do not lie down for 30 minutes after taking this medicine to prevent irritation to your throat.  If you are scheduled for any medical or dental procedure, tell your healthcare provider that you are taking this medicine. You may need to stop taking this medicine before the procedure.  This medicine may be used to treat migraines. If you take migraine medicines for 10 or more days a month, your migraines may get worse. Keep a diary of headache days and medicine use. Contact your healthcare professional if your migraine attacks occur more  frequently.  What side effects may I notice from receiving this medicine?  Side effects that you should report to your doctor or health care professional as soon as possible:  -allergic reactions like skin rash, itching or hives, swelling of the face, lips, or tongue  -breathing problems  -changes in hearing, ringing in the ears  -confusion  -general ill feeling or flu-like symptoms  -pain on swallowing  -redness, blistering, peeling or loosening of the skin, including inside the mouth or nose  -signs and symptoms of bleeding such as bloody or black, tarry stools; red or dark-brown urine; spitting up blood or brown material that looks like coffee grounds; red spots on the skin; unusual bruising or bleeding from the eye, gums, or nose  -trouble passing urine or change in the amount of urine  -unusually weak or tired  -yellowing of the eyes or skin  Side effects that usually do not require medical attention (report to your doctor or health care professional if they continue or are bothersome):  -diarrhea or constipation  -headache  -nausea, vomiting  -stomach gas, heartburn  This list may not describe all possible side effects. Call your doctor for medical advice about side effects. You may report side effects to FDA at 7-303-FDA-1707.  Where should I keep my medicine?  Keep out of the reach of children.  Store at room temperature between 15 and 30 degrees C (59 and 86 degrees F). Protect from heat and moisture. Do not use this medicine if it has a strong vinegar smell. Throw away any unused medicine after the expiration date.  NOTE: This sheet is a summary. It may not cover all possible information. If you have questions about this medicine, talk to your doctor, pharmacist, or health care provider.  © 2018 [employer]vier/Gold Standard (2014-08-19 11:30:31)      · Is patient discharged on Warfarin / Coumadin?   No     Depression / Suicide Risk    As you are discharged from this [facility] facility, it is important to  learn how to keep safe from harming yourself.    Recognize the warning signs:  · Abrupt changes in personality, positive or negative- including increase in energy   · Giving away possessions  · Change in eating patterns- significant weight changes-  positive or negative  · Change in sleeping patterns- unable to sleep or sleeping all the time   · Unwillingness or inability to communicate  · Depression  · Unusual sadness, discouragement and loneliness  · Talk of wanting to die  · Neglect of personal appearance   · Rebelliousness- reckless behavior  · Withdrawal from people/activities they love  · Confusion- inability to concentrate     If you or a loved one observes any of these behaviors or has concerns about self-harm, here's what you can do:  · Talk about it- your feelings and reasons for harming yourself  · Remove any means that you might use to hurt yourself (examples: pills, rope, extension cords, firearm)  · Get professional help from the community (Mental Health, Substance Abuse, psychological counseling)  · Do not be alone:Call your Safe Contact- someone whom you trust who will be there for you.  · Call your local CRISIS HOTLINE 601-3250 or 743-820-7134  · Call your local Children's Mobile Crisis Response Team [place] (893) 676-2168 or www.Vig[employer]  · Call the toll free National Suicide Prevention Hotlines   · National Suicide Prevention Lifeline 203-079-OFKG (7334)  · National Hope Line Network 800-SUICIDE (411-4720)

## 2019-02-15 NOTE — PROGRESS NOTES
"Ortho:  POD# 2    S: Improving slowly, pain well-controlled this am    O: Prevena skin vac clean and dry, functioning properly, calf and thigh soft with stable distal edema, distal CMS intact    Blood pressure 103/59, pulse 97, temperature 36.9 °C (98.5 °F), temperature source Temporal, resp. rate 18, height 1.6 m (5' 3\"), weight 89 kg (196 lb 3.4 oz), SpO2 97 %, not currently breastfeeding.    Recent Labs      02/13/19   1144  02/14/19   0610  02/15/19   0330   WBC  7.7  14.2*  8.1   RBC  4.47  4.14*  2.83*   HEMOGLOBIN  11.9*  11.2*  7.6*   HEMATOCRIT  34.9*  32.5*  22.6*   MCV  78.1*  78.5*  78.4*   MCH  26.6*  27.1  26.5*   MCHC  34.1  34.5  33.8   RDW  48.1  48.2  48.1   PLATELETCT  347  363  245   MPV  9.3  10.4  9.6         Intake/Output Summary (Last 24 hours) at 02/15/19 0734  Last data filed at 02/14/19 2009   Gross per 24 hour   Intake              120 ml   Output                0 ml   Net              120 ml         A:  Patient Active Problem List    Diagnosis Date Noted   • Mild intermittent asthma without complication 04/25/2012     Priority: High   • Status post left hip replacement 02/13/2019   • Primary insomnia 01/03/2019   • Mixed stress and urge urinary incontinence 01/03/2019   • Edema 12/13/2018   • Vitamin D deficiency 12/13/2018   • Anemia 12/13/2018   • Cervical myelopathy (HCC) 12/11/2018   • Encounter for work capability assessment- FMLA PAPERWORK 08/29/2018   • DDD (degenerative disc disease), cervical- MOD-SEVERE SPINE [place]- dr brennan; fusion and laminectomy C3-T1 12/5/2018 dr brennan 08/09/2018   • Primary osteoarthritis of left hip- dr nowak; left THR tbd feb 6, 2019 06/07/2018   • Obesity (BMI 30-39.9) 06/07/2018   • DDD (degenerative disc disease), lumbar 04/25/2018   • Uncomplicated opioid dependence (CMS-HCC)- kylah adkins 01/31/2018   • Essential hypertension 01/31/2018   • Venous insufficiency 10/03/2017   • Colon cancer screening- needs 08/09/2017   • Chronic bilateral low back pain " with bilateral sciatica- pain mgt; TERESA ROSA 07/26/2017   • Vitamin B 12 deficiency 06/02/2016   • History of gastric bypass 04/25/2012       Improving slowly s/p left VILMA and cabling of calcar fx    PLAN: Continue PT/OT, weight bearing will be toe-touch to left leg for 6 weeks.  No post op abx needed.  Plan is for transfer to rehab facility 2/16/19, d/c will be coordinated by Hospitalist which is greatly appreciated.

## 2019-02-15 NOTE — DISCHARGE PLANNING
Dr. White has medically cleared.  Insurance has authorized.  Transport has been arranged for 1500.  Dr. White is aware.  Msg placed to [place]  via TT.

## 2019-02-16 LAB
ALBUMIN SERPL BCP-MCNC: 2.4 G/DL (ref 3.2–4.9)
ALBUMIN/GLOB SERPL: 1 G/DL
ALP SERPL-CCNC: 94 U/L (ref 30–99)
ALT SERPL-CCNC: 6 U/L (ref 2–50)
ANION GAP SERPL CALC-SCNC: 4 MMOL/L (ref 0–11.9)
AST SERPL-CCNC: 14 U/L (ref 12–45)
BASOPHILS # BLD AUTO: 0.5 % (ref 0–1.8)
BASOPHILS # BLD: 0.04 K/UL (ref 0–0.12)
BILIRUB SERPL-MCNC: 0.3 MG/DL (ref 0.1–1.5)
BUN SERPL-MCNC: 12 MG/DL (ref 8–22)
CALCIUM SERPL-MCNC: 8.4 MG/DL (ref 8.5–10.5)
CHLORIDE SERPL-SCNC: 108 MMOL/L (ref 96–112)
CO2 SERPL-SCNC: 27 MMOL/L (ref 20–33)
CREAT SERPL-MCNC: 0.39 MG/DL (ref 0.5–1.4)
EOSINOPHIL # BLD AUTO: 0.48 K/UL (ref 0–0.51)
EOSINOPHIL NFR BLD: 5.5 % (ref 0–6.9)
ERYTHROCYTE [DISTWIDTH] IN BLOOD BY AUTOMATED COUNT: 49.9 FL (ref 35.9–50)
EST. AVERAGE GLUCOSE BLD GHB EST-MCNC: 97 MG/DL
GLOBULIN SER CALC-MCNC: 2.5 G/DL (ref 1.9–3.5)
GLUCOSE SERPL-MCNC: 105 MG/DL (ref 65–99)
HBA1C MFR BLD: 5 % (ref 0–5.6)
HCT VFR BLD AUTO: 24 % (ref 37–47)
HGB BLD-MCNC: 7.9 G/DL (ref 12–16)
IMM GRANULOCYTES # BLD AUTO: 0.02 K/UL (ref 0–0.11)
IMM GRANULOCYTES NFR BLD AUTO: 0.2 % (ref 0–0.9)
LYMPHOCYTES # BLD AUTO: 2.69 K/UL (ref 1–4.8)
LYMPHOCYTES NFR BLD: 31 % (ref 22–41)
MCH RBC QN AUTO: 26.5 PG (ref 27–33)
MCHC RBC AUTO-ENTMCNC: 32.9 G/DL (ref 33.6–35)
MCV RBC AUTO: 80.5 FL (ref 81.4–97.8)
MONOCYTES # BLD AUTO: 0.8 K/UL (ref 0–0.85)
MONOCYTES NFR BLD AUTO: 9.2 % (ref 0–13.4)
NEUTROPHILS # BLD AUTO: 4.65 K/UL (ref 2–7.15)
NEUTROPHILS NFR BLD: 53.6 % (ref 44–72)
NRBC # BLD AUTO: 0 K/UL
NRBC BLD-RTO: 0 /100 WBC
PLATELET # BLD AUTO: 282 K/UL (ref 164–446)
PMV BLD AUTO: 10.1 FL (ref 9–12.9)
POTASSIUM SERPL-SCNC: 3.6 MMOL/L (ref 3.6–5.5)
PROT SERPL-MCNC: 4.9 G/DL (ref 6–8.2)
RBC # BLD AUTO: 2.98 M/UL (ref 4.2–5.4)
SODIUM SERPL-SCNC: 139 MMOL/L (ref 135–145)
TSH SERPL DL<=0.005 MIU/L-ACNC: 2.2 UIU/ML (ref 0.38–5.33)
WBC # BLD AUTO: 8.7 K/UL (ref 4.8–10.8)

## 2019-02-16 PROCEDURE — 97166 OT EVAL MOD COMPLEX 45 MIN: CPT

## 2019-02-16 PROCEDURE — 85025 COMPLETE CBC W/AUTO DIFF WBC: CPT

## 2019-02-16 PROCEDURE — A9270 NON-COVERED ITEM OR SERVICE: HCPCS | Performed by: PHYSICAL MEDICINE & REHABILITATION

## 2019-02-16 PROCEDURE — 97110 THERAPEUTIC EXERCISES: CPT

## 2019-02-16 PROCEDURE — 700102 HCHG RX REV CODE 250 W/ 637 OVERRIDE(OP): Performed by: HOSPITALIST

## 2019-02-16 PROCEDURE — 84443 ASSAY THYROID STIM HORMONE: CPT

## 2019-02-16 PROCEDURE — 97530 THERAPEUTIC ACTIVITIES: CPT

## 2019-02-16 PROCEDURE — 80053 COMPREHEN METABOLIC PANEL: CPT

## 2019-02-16 PROCEDURE — 83036 HEMOGLOBIN GLYCOSYLATED A1C: CPT

## 2019-02-16 PROCEDURE — 97162 PT EVAL MOD COMPLEX 30 MIN: CPT

## 2019-02-16 PROCEDURE — 700102 HCHG RX REV CODE 250 W/ 637 OVERRIDE(OP): Performed by: PHYSICAL MEDICINE & REHABILITATION

## 2019-02-16 PROCEDURE — 770010 HCHG ROOM/CARE - REHAB SEMI PRIVAT*

## 2019-02-16 PROCEDURE — A9270 NON-COVERED ITEM OR SERVICE: HCPCS | Performed by: HOSPITALIST

## 2019-02-16 PROCEDURE — 36415 COLL VENOUS BLD VENIPUNCTURE: CPT

## 2019-02-16 PROCEDURE — 700112 HCHG RX REV CODE 229: Performed by: PHYSICAL MEDICINE & REHABILITATION

## 2019-02-16 RX ORDER — ACETAMINOPHEN 500 MG
1000 TABLET ORAL 3 TIMES DAILY
Status: DISCONTINUED | OUTPATIENT
Start: 2019-02-16 | End: 2019-03-02 | Stop reason: HOSPADM

## 2019-02-16 RX ADMIN — GABAPENTIN 300 MG: 300 CAPSULE ORAL at 15:19

## 2019-02-16 RX ADMIN — DOCUSATE SODIUM 100 MG: 100 CAPSULE, LIQUID FILLED ORAL at 08:33

## 2019-02-16 RX ADMIN — ACETAMINOPHEN 1000 MG: 500 TABLET, FILM COATED ORAL at 03:34

## 2019-02-16 RX ADMIN — MORPHINE SULFATE 30 MG: 30 TABLET, EXTENDED RELEASE ORAL at 08:33

## 2019-02-16 RX ADMIN — GABAPENTIN 300 MG: 300 CAPSULE ORAL at 08:33

## 2019-02-16 RX ADMIN — OXYCODONE HYDROCHLORIDE 10 MG: 10 TABLET ORAL at 07:29

## 2019-02-16 RX ADMIN — METHOCARBAMOL TABLETS 1000 MG: 500 TABLET, COATED ORAL at 17:42

## 2019-02-16 RX ADMIN — MORPHINE SULFATE 30 MG: 30 TABLET, EXTENDED RELEASE ORAL at 21:21

## 2019-02-16 RX ADMIN — FERROUS SULFATE TAB 325 MG (65 MG ELEMENTAL FE) 325 MG: 325 (65 FE) TAB at 08:34

## 2019-02-16 RX ADMIN — ACETAMINOPHEN 1000 MG: 500 TABLET, FILM COATED ORAL at 15:19

## 2019-02-16 RX ADMIN — ACETAMINOPHEN 1000 MG: 500 TABLET, FILM COATED ORAL at 08:33

## 2019-02-16 RX ADMIN — METHOCARBAMOL TABLETS 1000 MG: 500 TABLET, COATED ORAL at 11:47

## 2019-02-16 RX ADMIN — OXYCODONE HYDROCHLORIDE 10 MG: 10 TABLET ORAL at 11:47

## 2019-02-16 RX ADMIN — GABAPENTIN 300 MG: 300 CAPSULE ORAL at 21:23

## 2019-02-16 RX ADMIN — OXYCODONE HYDROCHLORIDE 10 MG: 10 TABLET ORAL at 03:38

## 2019-02-16 RX ADMIN — TRAZODONE HYDROCHLORIDE 50 MG: 50 TABLET ORAL at 21:23

## 2019-02-16 RX ADMIN — METHOCARBAMOL TABLETS 1000 MG: 500 TABLET, COATED ORAL at 21:22

## 2019-02-16 RX ADMIN — ASPIRIN 81 MG: 81 TABLET, COATED ORAL at 17:42

## 2019-02-16 RX ADMIN — METHOCARBAMOL TABLETS 1000 MG: 500 TABLET, COATED ORAL at 08:33

## 2019-02-16 RX ADMIN — CYANOCOBALAMIN TAB 1000 MCG 1000 MCG: 1000 TAB at 08:33

## 2019-02-16 RX ADMIN — ACETAMINOPHEN 1000 MG: 500 TABLET, FILM COATED ORAL at 21:21

## 2019-02-16 RX ADMIN — ASPIRIN 81 MG: 81 TABLET, COATED ORAL at 05:36

## 2019-02-16 RX ADMIN — DOCUSATE SODIUM 100 MG: 100 CAPSULE, LIQUID FILLED ORAL at 21:22

## 2019-02-16 ASSESSMENT — GAIT ASSESSMENTS
GAIT LEVEL OF ASSIST: CONTACT GUARD ASSIST
DISTANCE (FEET): 10
ASSISTIVE DEVICE: PARALLEL BARS

## 2019-02-16 ASSESSMENT — BRIEF INTERVIEW FOR MENTAL STATUS (BIMS)
ASKED TO RECALL BLUE: YES, NO CUE REQUIRED
ASKED TO RECALL BED: YES, NO CUE REQUIRED
BIMS SUMMARY SCORE: 15
WHAT YEAR IS IT: CORRECT
WHAT DAY OF THE WEEK IS IT: CORRECT
WHAT MONTH IS IT: ACCURATE WITHIN 5 DAYS
ASKED TO RECALL SOCK: YES, NO CUE REQUIRED
INITIAL REPETITION OF BED BLUE SOCK - FIRST ATTEMPT: 3

## 2019-02-16 ASSESSMENT — ACTIVITIES OF DAILY LIVING (ADL): TOILETING: REQUIRES ASSIST

## 2019-02-16 NOTE — PROGRESS NOTES
Patient admitted to facility at 1530 via w/c; accompanied by hospital transport.  Patient assisted to room and positioned in bed for comfort and safety; call light within reach.  Patient assisted with stowing belongings and oriented to room and facility.  Admission assessment performed and documented in computer. Patient received and reviewed education binder. Admission paperwork completed; signed copies placed in chart.  Will continue to monitor.

## 2019-02-16 NOTE — CARE PLAN
Problem: Bowel/Gastric:  Goal: Normal bowel function is maintained or improved  Outcome: PROGRESSING AS EXPECTED  Patient having regular bowel movements; last BM 2/15/19.  Denies s/s constipation; bowel meds available if needed.     Problem: Mobility  Goal: Risk for activity intolerance will decrease  Outcome: PROGRESSING AS EXPECTED  Patient has right sided weakness from previous neck surgery and new hip replacement, but is able to turn and reposition self.

## 2019-02-16 NOTE — THERAPY
Occupational Therapy Initial Plan of Care Note    1) Assessment:  Patient is 53 y.o. female with a diagnosis of Unilateral Hip Fracture.  Additional factors influencing patient status / progress (ie: cognitive factors, co-morbidities, social support, etc): hx of cervical spine fusion, arthritis, gastric bypass surgery, and urinary incontinence.  Long term and short term goals have been discussed with patient and they are in agreement.  2) Plan:  Recommend Occupational Therapy  minutes per day 5-7 days per week for 10-14 days for the following treatments:  OT E Stim Attended, OT Self Care/ADL, OT Community Reintegration, OT Manual Ther Technique, OT Neuro Re-Ed/Balance, OT Therapeutic Activity, OT Evaluation and OT Therapeutic Exercise.  3) Goals:  Please refer to care plan for goals.

## 2019-02-16 NOTE — PROGRESS NOTES
Received patient during shift change, report rec'd from day shift RN. Resting in bed, VS stable on room air. Wound vac in place to L Hip Sx site, c/d/i. Per report continent of B&[facility], Max assist for transfers. A&O x 4, able to make needs known. Bed in low position, call light within reach.

## 2019-02-16 NOTE — CARE PLAN
Problem: Safety  Goal: Will remain free from injury  Encourage to use call light for assist to toilet/transfer. Using call light for assist. Max assist for transfers. Bed in low position, call light within reach.    Problem: Pain Management  Goal: Pain level will decrease to patient's comfort goal  Scheduled pain med administered at HS, good effect. Upon reassess, resting in bed, eyes closed, resp even, unlabored.

## 2019-02-16 NOTE — PROGRESS NOTES
Two RN skin check with Mathew, small wound on left buttock noted and pictures taken, unable to take pic of Left hip d/t wound vac in place.

## 2019-02-16 NOTE — H&P
REHABILITATION HISTORY AND PHYSICAL/POST ADMISSION PHYSICAL EVALUATION    Date of Admission: 2/15/2019  4:24 PM  Karine Ovalle  RH21/02    [facility] Code / Diagnosis to Support: 08.11 Unilateral Hip Fracture  Etiologic Diagnosis: Status post left hip replacement    CC: pain, left hip pain    HPI:   Patient is a 53 y.o. year-old female with a past medical history significant for Arthritis, urinary incontinence and hx of gastric bypass admitted to [facility] on 2/13/2019 10:59 AM with fall. Per report patient was preparing to have left hip surgery when she fell in the shower. Per ED report there was no head strike or LOC. Patient was admitted with questionable acetabular fracture. Patient was taken for left hip arthroplasty with Dr. Levine on 2/13/19. Patient tolerated procedure well. Her Hemoglobin has dropped from 11.9 to 7.6 on 2/15/19. Per report patient is TTWB on left leg for 6 weeks. At baseline patient is on Morphine ER and Oxycodone, currently not on IV pain medications.      Patient lives in a 1 story home with 2 steps to enter; lives alone. Previously requiring intermittent help for physical tasks. Patient was evaluated by PT on 2/14/19 and was maxA for transfers and unable to participate in gait. Patient has not been evaluated by OT. Patient was previously at [facility] for cervical spine fusion in December 2018.    Since my consult recheck of Hemoglobin to 8.2 and iron panel showed very low iron.     Patient tolerated transfer over. She reports her pain is controlled because she just received pain medications prior to transfer. She reports she is doing well. Denies NVD. Denies numbness or sensation loss in LLE. Denies new weakness besides pain limited. She has a pair of SCDs.  Patient denies SOB or cough.     REVIEW OF SYSTEMS:     Comprehensive 14 point ROS was reviewed and all were negative except as noted elsewhere in this document.     PMH:  Past Medical History:   Diagnosis Date   • Anemia    •  "Arthritis     osteo/hips and back   • At risk for falls    • Chronic back pain    • Dental disorder     lower denture   • Pain 12/04/2018    \"ALL OVER\", 10/10   • Pain 02/04/2019    arthritic pain   • Urinary incontinence     using pads       PSH:  Past Surgical History:   Procedure Laterality Date   • HIP ARTHROPLASTY TOTAL Left 2/13/2019    Procedure: HIP ARTHROPLASTY TOTAL, Cabling of intra-operative calcar fracture;  Surgeon: Bryon Levine M.D.;  Location: [facility];  Service: Orthopedics   • CERVICAL FUSION POSTERIOR  12/7/2018    Procedure: CERVICAL FUSION POSTERIOR- STAGE #2 C3-5 AND C3-T1;  Surgeon: Emre Mendoza III, M.D.;  Location: [facility];  Service: Neurosurgery   • CERVICAL LAMINECTOMY POSTERIOR  12/7/2018    Procedure: CERVICAL LAMINECTOMY POSTERIOR C2-T1;  Surgeon: Emre Mendoza III, M.D.;  Location: [facility];  Service: Neurosurgery   • CERVICAL DISK AND FUSION ANTERIOR  12/5/2018    Procedure: CERVICAL DISK AND FUSION ANTERIOR-STAGE #1 C4-5;  Surgeon: Emre Mendoza III, M.D.;  Location: [facility];  Service: Neurosurgery   • CORPECTOMY  12/5/2018    Procedure: CORPECTOMY;  Surgeon: Emre Mendoza III, M.D.;  Location: [facility];  Service: Neurosurgery   • HIP ARTHROPLASTY TOTAL Right 3/20/2018    Procedure: HIP ARTHROPLASTY TOTAL;  Surgeon: Bryon Levine M.D.;  Location: [facility];  Service: Orthopedics   • KNEE ARTHROPLASTY TOTAL Right 10/20/2015    Procedure: KNEE ARTHROPLASTY TOTAL;  Surgeon: Bryon Levine M.D.;  Location: [facility];  Service:    • APPENDECTOMY LAPAROSCOPIC  3/21/2013    Performed by Dali Queen M.D. at [facility]   • DEBRIDEMENT  5/17/2012    Performed by BRADLEY DYKES at [facility]   • PLASTIC SURGERY  2004    excess skin on arms and legs removed   • MAMMOPLASTY AUGMENTATION Bilateral 2004    breast implants and \"tummy tuck\"   • GASTRIC " BYPASS LAPAROSCOPIC  2002    x 2   • ABDOMINAL EXPLORATION         FAMILY HISTORY:  Family History   Problem Relation Age of Onset   • Diabetes Mother    • Heart Disease Mother          MEDICATIONS:  Current Facility-Administered Medications   Medication Dose   • Respiratory Care per Protocol     • Pharmacy Consult Request ...Pain Management Review 1 Each  1 Each   • hydrALAZINE (APRESOLINE) tablet 25 mg  25 mg   • acetaminophen (TYLENOL) tablet 650 mg  650 mg   • artificial tears 1.4 % ophthalmic solution 1 Drop  1 Drop   • benzocaine-menthol (CEPACOL) lozenge 1 Lozenge  1 Lozenge   • mag hydrox-al hydrox-simeth (MAALOX PLUS ES or MYLANTA DS) suspension 20 mL  20 mL   • ondansetron (ZOFRAN ODT) dispertab 4 mg  4 mg    Or   • ondansetron (ZOFRAN) syringe/vial injection 4 mg  4 mg   • traZODone (DESYREL) tablet 50 mg  50 mg   • sodium chloride (OCEAN) 0.65 % nasal spray 2 Spray  2 Spray   • acetaminophen (TYLENOL) tablet 650 mg  650 mg   • aspirin EC (ECOTRIN) tablet 81 mg  81 mg   • docusate sodium (COLACE) capsule 100 mg  100 mg   • scopolamine (TRANSDERM-SCOP) patch 1 Patch  1 Patch   • tramadol (ULTRAM) 50 MG tablet 50 mg  50 mg   • zolpidem (AMBIEN) tablet 5 mg  5 mg   • acetaminophen (TYLENOL) tablet 1,000 mg  1,000 mg   • [START ON 2/16/2019] cyanocobalamin (VITAMIN B12) tablet 1,000 mcg  1,000 mcg   • gabapentin (NEURONTIN) capsule 300 mg  300 mg   • methocarbamol (ROBAXIN) tablet 1,000 mg  1,000 mg   • morphine ER (MS CONTIN) tablet 30 mg  30 mg   • oxyCODONE immediate release (ROXICODONE) tablet 10 mg  10 mg   • [START ON 2/22/2019] vitamin D (Ergocalciferol) (DRISDOL) capsule 50,000 Units  50,000 Units   • [START ON 2/16/2019] ferrous sulfate tablet 325 mg  325 mg       ALLERGIES:  Patient has no known allergies.    PSYCHOSOCIAL HISTORY:  Housing / Facility: 1 Story Apartment / Condo  Steps Into Home: 2  Steps In Home: 0  Lives with - Patient's Self Care Capacity: Significant Other  Equipment Owned:  Wheelchair, Front-Wheel Walker     Prior Level of Function / Living Situation:   Physical Therapy: Prior Services: Continuous (24 Hour) Care Giving Family  Housing / Facility: 1 Story Apartment / Condo  Steps Into Home: 2  Steps In Home: 0  Bathroom Set up: Bathtub / Shower Combination  Equipment Owned: Wheelchair, Front-Wheel Walker  Lives with - Patient's Self Care Capacity: Significant Other  Bed Mobility: Required Assist  Transfer Status: Required Assist  Ambulation: Dependent  Distance Ambulation (Feet):  (w/c bound)  Assistive Devices Used: Wheelchair  Wheelchair: Independent  Current Level of Function:   Level Of Assist: Unable to Participate  Assistive Device: Front Wheel Walker  Weight Bearing Status: TTWB L LE  Supine to Sit: Maximal Assist  Sit to Supine: Moderate Assist  Scooting: Maximal Assist  Rolling: Maximum Assist to Lt., Maximal Assist to Rt.  Sit to Stand: Maximal Assist  Bed, Chair, Wheelchair Transfer: Maximal Assist  Toilet Transfers: Maximal Assist  Transfer Method: Stand Pivot (using FWW with cues for TTWB)  Sitting Edge of Bed: 30 min  Standin sec x 4  Occupational Therapy:   Self Feeding: Independent  Grooming / Hygiene: Requires Assist  Bathing: Requires Assist  Dressing: Requires Assist  Toileting: Requires Assist  Medication Management: Independent  Laundry: Requires Assist  Kitchen Mobility: Requires Assist  Finances: Requires Assist  Home Management: Requires Assist  Shopping: Requires Assist  Prior Level Of Mobility: Supervision With Device in Home, Supervision With Device in Community  Driving / Transportation: Relatives / Others Provide Transportation  Prior Services: Continuous (24 Hour) Care Giving Family  Housing / Facility: 1 Story Apartment / Condo  Current Level of Function:   Eating: Supervision  Bathing: Maximal Assist  Upper Body Dressing: Minimal Assist  Lower Body Dressing: Maximal Assist  Toileting: Maximal Assist  Speech Language Pathology:      Rehabilitation  "Prognosis/Potential: Good  Estimated Length of Stay: 10-14 days     Nursing:   Orientation : Oriented x 4  Continent    CURRENT LEVEL OF FUNCTION:   Same as level of function prior to admission to [facility]    PHYSICAL EXAM:     VITAL SIGNS:   height is 1.6 m (5' 3\") and weight is 87.5 kg (192 lb 14.4 oz). Her oral temperature is 36.6 °C (97.9 °F). Her blood pressure is 109/59 and her pulse is 75. Her respiration is 18 and oxygen saturation is 93%.     GENERAL: No apparent distress  HEENT: Normocephalic/atraumatic, EOMI and PERRL  CARDIAC: Regular rate and rhythm, normal S1, S2   LUNGS: Clear to auscultation   ABDOMINAL: bowel sounds present, soft, nontender and nondistended    EXTREMITIES: no contractures, spasticity, or edema.  LLE with prevana on hip. Pulses intact bilaterally  NEURO:  Mental status:  A&Ox4 (person, place, date, situation) answers questions appropriately follows commands  Speech: fluent, no aphasia or dysarthria  CRANIAL NERVES: CN 2-12 intact  Motor:  UE exam deferred  RLE 4/5, LLE 3/5 due to pain proximally and 4/5 distal   Sensory: intact to light touch through out including LLE    DTRs: 2+ in bilateral  patellar tendons    RADIOLOGY:     XR L hip  Impression       Left femoral head osteonecrosis and increasing obstruction.  Marked arthritic changes left hip joint.  No acute fracture.     CXR 2/13/19  Impression       No pneumothorax.         LABS:  Recent Labs      02/13/19   1144  02/14/19   0610  02/15/19   0330   SODIUM  139  138  140   POTASSIUM  3.8  4.2  3.7   CHLORIDE  103  105  109   CO2  29  25  27   GLUCOSE  90  121*  98   BUN  13  11  13   CREATININE  0.60  0.61  0.45*   CALCIUM  8.7  9.2  8.2*     Recent Labs      02/14/19   0610  02/15/19   0330  02/15/19   1323   WBC  14.2*  8.1  9.4   RBC  4.14*  2.83*  3.09*   HEMOGLOBIN  11.2*  7.6*  8.2*   HEMATOCRIT  32.5*  22.6*  24.6*   MCV  78.5*  78.4*  79.6*   MCH  27.1  26.5*  26.5*   MCHC  34.5  33.8  33.3*   RDW "  48.2  48.1  49.3   PLATELETCT  363  245  294   MPV  10.4  9.6  10.0     Recent Labs      02/13/19   1144   APTT  31.3   INR  1.00         PRIMARY REHAB DIAGNOSIS:    This patient is a 53 y.o. female admitted for acute inpatient rehabilitation with Status post left hip replacement.    IMPAIRMENTS:   ADLs/IADLs  Mobility    SECONDARY DIAGNOSIS/MEDICAL CO-MORBIDITIES AFFECTING FUNCTION:  arthritis, urinary incontinence and hx of gastric bypass  Anemia, iron deficient  Vitamin D and B12 deficit    RELEVANT CHANGES SINCE PREADMISSION EVALUATION:    Status unchanged    The patient's rehabilitation potential is Good  The patient's medical prognosis is good    PLAN:   Discussion and Recommendations:   1. The patient requires an acute inpatient rehabilitation program with a coordinated program of care at an intensity and frequency not available at a lower level of care. This recommendation is substantiated by the patient's medical physicians who recommend that the patient's intervention and assessment of medical issues needs to be done at an acute level of care for patient's safety and maximum outcome.   2. A coordinated program of care will be supplied by an interdisciplinary team of physical therapy, occupational therapy, rehab physician, rehab nursing, and, if needed, speech therapy and rehab psychology. Rehab team presents a patient-specific rehabilitation and education program concentrating on prevention of future problems related to accessibility, mobility, skin, bowel, bladder, sexuality, and psychosocial and medical/surgical problems.   3. Need for Rehabilitation Physician: The rehab physician will be evaluating the patient on a multi-weekly basis to help coordinate the program of care. The rehab physician communicates between medical physicians, therapists, and nurses to maximize the patient's potential outcome. Specific areas in which the rehab physician will be providing daily assessment include the following:    A. Assessing the patient's heart rate and blood pressure response (vitals monitoring) to activity and making adjustments in medications or conservative measures as needed.   B. The rehab physician will be assessing the frequency at which the program can be increased to allow the patient to reach optimal functional outcome.   C. The rehab physician will also provide assessments in daily skin care, especially in light of patient's impairments in mobility.   D. The rehab physician will provide special expertise in understanding how to work with functional impairment and recommend appropriate interventions, compensatory techniques, and education that will facilitate the patient's outcome.   4. Rehab R.N.   The rehab RN will be working with patient to carry over in room mobility and activities of daily living when the patient is not in 3 hours of skilled therapy. Rehab nursing will be working in conjunction with rehab physician to address all the medical issues above and continue to assess laboratory work and discuss abnormalities with the treating physicians, assess vitals, and response to activity, and discuss and report abnormalities with the rehab physician. Rehab RN will also continue daily skin care, supervise bladder/bowel program, instruct in medication administration, and ensure patient safety.   5. Rehab Therapy: Therapies to treat at intensity and frequency of (may change after completion of evaluation by all therapeutic disciplines):       PT:  Physical therapy to address mobility, transfer, gait training and evaluation for adaptive equipment needs 1 and 1/2 hour/day at least 5 days/week for the duration of the ELOS (see below)       OT:  Occupational therapy to address ADLs, self-care, home management training, functional mobility/transfers and assistive device evaluation, and community re-integration 1 and 1/2 hour/day at least 5 days/week for the duration of the ELOS (see below).     Medical management /  Rehabilitation Issues/ Adverse Potential as part of rehabilitation plan     REHABILITATION ISSUES/ADVERSE POTENTIAL:  1.  L Hip fracture - s/p fall morning prior to scheduled VILMA. Patient now s/p VILMA with Dr. Levine on 2/13/19. Patient demonstrates functional deficits in strength, balance, coordination, and ADL's. Patient is admitted to [facility] for comprehensive rehabilitation therapy as described below.   Rehabilitation nursing monitors bowel and bladder control, educates on medication administration, co-morbidities and monitors patient safety.    2.  Neurostimulants: None at this time but continue to assess daily for need to initiate should status change.    3.  DVT prophylaxis:  Patient is on SCDs for anticoagulation upon transfer. Encourage OOB. Monitor daily for signs and symptoms of DVT including but not limited to swelling and pain to prevent the development of DVT that may interfere with therapies.    4.  GI prophylaxis:  On PO diet.    5.  Pain: No issues with pain currently / Controlled with APAP/Oxycodone/Morphine ER/Gabapentin    6.  Nutrition/Dysphagia: Dietician monitors nutrient intake, recommend supplements prn and provide nutrition education to pt/family to promote optimal nutrition for wound healing/recovery.     7.  Bladder/bowel:  Start bowel and bladder program as described below, to prevent constipation, urinary retention (which may lead to UTI), and urinary incontinence (which will impact upon pt's functional independence).   - Post void bladder scans, I&O cath for PVRs >400  - up to commode after meal     8.  Skin/dermal ulcer prophylaxis: Monitor for new skin conditions with q.2 h. turns as required to prevent the development of skin breakdown.     9.  Cognition/Behavior:  Psychologist Dr. Ervin provides adjustment counseling to illness and psychosocial barriers that may be potential barriers to rehabilitation.     10. Respiratory therapy: RT performs O2 management  prn, breathing retraining, pulmonary hygiene and bronchospasm management prn to optimize participation in therapies.     MEDICAL CO-MORBIDITIES/ADVERSE POTENTIAL AFFECTING FUNCTION:  Left hip fracture - s/p Left hip VILMA with Dr. Levine on 2/13/19. Was already scheduled for hip replacement when fall occurred. Recently at [facility] for rehab for cervical surgery with C4 AIS C injury.   -TTWB on LLE  -PT and OT for mobility and ADLs    Pain - Patient on scheduled Tylenol, Morphine ER 15 mg BID, and Gabapentin. Also on Robaxin.     Edema - Previously on Lasix, will monitor    Vitamin Deficiency - s/p gastric bypass. On B12 1000 mcg and Vitamin D 40328 weekly on transfer.    Neurogenic bladder - Patient previously on Lasix for swelling and had incontinence. Will monitor and perform timed voiding if appropriate.     Obesity - Patient with BMI of 35 on admission. Will monitor weights.     Anemia - up to 8.2 prior to admission. Was prior down to 7.6.  Check admission CBC    DVT ppx - Patient on SCDs at [facility] concern for bleeding with Hgb down to 7.6. Patient brought in her own SCDs and prefers to use them.     I personally performed a complete drug regimen review and no potential clinically significant medication issues were identified.     Pt was seen today for 80 min, and entire time spent in face-to-face contact was >50% in counseling and coordination of care as detailed in A/P above.        GOALS/EXPECTED LEVEL OF FUNCTION BASED ON CURRENT MEDICAL AND FUNCTIONAL STATUS (may change based on patient's medical status and rate of impairment recovery):  Transfers:   Modified Independent  Mobility/Gait:   Modified Independent  ADL's:   Modified Independent    DISPOSITION: Discharge to pre-morbid independent living setting with the supportive care of patient's family.    ELOS: 10-14 days

## 2019-02-16 NOTE — PROGRESS NOTES
Received shift report and assumed care of patient.  Patient awake, calm and stable, currently positioned in bed for comfort and safety; call light within reach.  9/10 hip and neck pain at this time, see mar.  Will continue to monitor.

## 2019-02-16 NOTE — PROGRESS NOTES
PM&R Note:    Reviewed admission labs, note hematocrit 24, stable, on iron.  Labs otherwise relatively unremarkable.  A1C pending.  Monitor

## 2019-02-16 NOTE — THERAPY
Physical Therapy Initial Plan of Care Note    1) Assessment: Patient is 53 y.o. female with a diagnosis of left hip fracture and left total hip arthroplasty.  Additional factors influencing patient status / progress (ie: cognitive factors, co-morbidities, social support, etc): TTWB LLE, Left hip fracture with total hip arthroplasty left posterior hip precautions, arthritis, fall risk, impaired bed mobility, transfers, gait, balance.  Long term and short term goals have been discussed with patient and they are in agreement.  2) Plan: Recommend Physical Therapy  minutes per day 5-7 days per week for 2 weeks for the following treatments:  PT Gait Training, PT Therapeutic Exercises, PT Therapeutic Activity and PT Evaluation.  3) Goals:  Please refer to care plan for goals.

## 2019-02-17 PROCEDURE — 700102 HCHG RX REV CODE 250 W/ 637 OVERRIDE(OP): Performed by: PHYSICAL MEDICINE & REHABILITATION

## 2019-02-17 PROCEDURE — 700102 HCHG RX REV CODE 250 W/ 637 OVERRIDE(OP): Performed by: HOSPITALIST

## 2019-02-17 PROCEDURE — A9270 NON-COVERED ITEM OR SERVICE: HCPCS | Performed by: PHYSICAL MEDICINE & REHABILITATION

## 2019-02-17 PROCEDURE — 700112 HCHG RX REV CODE 229: Performed by: PHYSICAL MEDICINE & REHABILITATION

## 2019-02-17 PROCEDURE — 770010 HCHG ROOM/CARE - REHAB SEMI PRIVAT*

## 2019-02-17 PROCEDURE — A9270 NON-COVERED ITEM OR SERVICE: HCPCS | Performed by: HOSPITALIST

## 2019-02-17 RX ADMIN — ASPIRIN 81 MG: 81 TABLET, COATED ORAL at 05:50

## 2019-02-17 RX ADMIN — MORPHINE SULFATE 30 MG: 30 TABLET, EXTENDED RELEASE ORAL at 20:49

## 2019-02-17 RX ADMIN — OXYCODONE HYDROCHLORIDE 10 MG: 10 TABLET ORAL at 08:45

## 2019-02-17 RX ADMIN — FERROUS SULFATE TAB 325 MG (65 MG ELEMENTAL FE) 325 MG: 325 (65 FE) TAB at 08:49

## 2019-02-17 RX ADMIN — METHOCARBAMOL TABLETS 1000 MG: 500 TABLET, COATED ORAL at 08:48

## 2019-02-17 RX ADMIN — ASPIRIN 81 MG: 81 TABLET, COATED ORAL at 17:53

## 2019-02-17 RX ADMIN — METHOCARBAMOL TABLETS 1000 MG: 500 TABLET, COATED ORAL at 17:53

## 2019-02-17 RX ADMIN — ZOLPIDEM TARTRATE 5 MG: 5 TABLET ORAL at 20:49

## 2019-02-17 RX ADMIN — ACETAMINOPHEN 1000 MG: 500 TABLET, FILM COATED ORAL at 16:26

## 2019-02-17 RX ADMIN — MORPHINE SULFATE 30 MG: 30 TABLET, EXTENDED RELEASE ORAL at 08:48

## 2019-02-17 RX ADMIN — GABAPENTIN 300 MG: 300 CAPSULE ORAL at 16:26

## 2019-02-17 RX ADMIN — ACETAMINOPHEN 1000 MG: 500 TABLET, FILM COATED ORAL at 08:49

## 2019-02-17 RX ADMIN — GABAPENTIN 300 MG: 300 CAPSULE ORAL at 08:48

## 2019-02-17 RX ADMIN — CYANOCOBALAMIN TAB 1000 MCG 1000 MCG: 1000 TAB at 08:49

## 2019-02-17 RX ADMIN — METHOCARBAMOL TABLETS 1000 MG: 500 TABLET, COATED ORAL at 13:59

## 2019-02-17 RX ADMIN — GABAPENTIN 300 MG: 300 CAPSULE ORAL at 20:49

## 2019-02-17 RX ADMIN — ACETAMINOPHEN 1000 MG: 500 TABLET, FILM COATED ORAL at 20:49

## 2019-02-17 RX ADMIN — OXYCODONE HYDROCHLORIDE 10 MG: 10 TABLET ORAL at 20:49

## 2019-02-17 RX ADMIN — DOCUSATE SODIUM 100 MG: 100 CAPSULE, LIQUID FILLED ORAL at 20:49

## 2019-02-17 RX ADMIN — METHOCARBAMOL TABLETS 1000 MG: 500 TABLET, COATED ORAL at 20:49

## 2019-02-17 RX ADMIN — DOCUSATE SODIUM 100 MG: 100 CAPSULE, LIQUID FILLED ORAL at 08:49

## 2019-02-17 ASSESSMENT — PATIENT HEALTH QUESTIONNAIRE - PHQ9
SUM OF ALL RESPONSES TO PHQ9 QUESTIONS 1 AND 2: 0
1. LITTLE INTEREST OR PLEASURE IN DOING THINGS: NOT AT ALL
2. FEELING DOWN, DEPRESSED, IRRITABLE, OR HOPELESS: NOT AT ALL

## 2019-02-17 NOTE — PROGRESS NOTES
Received patient during shift change, report rec'd from day shift RN. Resting in bed, VS stable on room air. Continent of B&B, mod assist for transfers. A&O x 4, able to make needs known. Bed in low position, call light within reach.

## 2019-02-17 NOTE — CARE PLAN
Problem: Safety  Goal: Will remain free from injury  Encourage to use call light for assist to toilet/transfer. Using call light for assist, bed in low position, call light within reach.    Problem: Skin Integrity  Goal: Risk for impaired skin integrity will decrease  Wound vac in place to L hip sx site. C/D/I. No s/s of infection, VS stable, afebrile.

## 2019-02-17 NOTE — CARE PLAN
Problem: Infection  Goal: Will remain free from infection  Pt is able to verbalize s/s of infection: redness of area, fever, pain. Shows no s/s of infection, will continue to monitor and reinforce education as needed

## 2019-02-17 NOTE — CARE PLAN
Problem: Safety  Goal: Will remain free from injury  Outcome: PROGRESSING AS EXPECTED  Patient demonstrates good safety technique this shift.  Asks for assistance when needed and does not attempt self transfer.  Able to verbalize needs.     Problem: Infection  Goal: Will remain free from infection  Outcome: PROGRESSING AS EXPECTED  Patient remains free from s/s infection; afebrile. Patient's skin remains intact and free from new or accidental injury this shift.

## 2019-02-18 PROCEDURE — A9270 NON-COVERED ITEM OR SERVICE: HCPCS | Performed by: PHYSICAL MEDICINE & REHABILITATION

## 2019-02-18 PROCEDURE — 99232 SBSQ HOSP IP/OBS MODERATE 35: CPT | Performed by: PHYSICAL MEDICINE & REHABILITATION

## 2019-02-18 PROCEDURE — 770010 HCHG ROOM/CARE - REHAB SEMI PRIVAT*

## 2019-02-18 PROCEDURE — 97110 THERAPEUTIC EXERCISES: CPT

## 2019-02-18 PROCEDURE — 700102 HCHG RX REV CODE 250 W/ 637 OVERRIDE(OP): Performed by: HOSPITALIST

## 2019-02-18 PROCEDURE — A9270 NON-COVERED ITEM OR SERVICE: HCPCS | Performed by: HOSPITALIST

## 2019-02-18 PROCEDURE — 97530 THERAPEUTIC ACTIVITIES: CPT

## 2019-02-18 PROCEDURE — 700102 HCHG RX REV CODE 250 W/ 637 OVERRIDE(OP): Performed by: PHYSICAL MEDICINE & REHABILITATION

## 2019-02-18 PROCEDURE — 700112 HCHG RX REV CODE 229: Performed by: PHYSICAL MEDICINE & REHABILITATION

## 2019-02-18 PROCEDURE — 97535 SELF CARE MNGMENT TRAINING: CPT

## 2019-02-18 RX ORDER — POTASSIUM CHLORIDE 20 MEQ/1
20 TABLET, EXTENDED RELEASE ORAL DAILY
Status: DISCONTINUED | OUTPATIENT
Start: 2019-02-19 | End: 2019-02-20

## 2019-02-18 RX ORDER — FUROSEMIDE 40 MG/1
40 TABLET ORAL DAILY
Status: DISCONTINUED | OUTPATIENT
Start: 2019-02-18 | End: 2019-02-20

## 2019-02-18 RX ADMIN — DOCUSATE SODIUM 100 MG: 100 CAPSULE, LIQUID FILLED ORAL at 20:47

## 2019-02-18 RX ADMIN — CYANOCOBALAMIN TAB 1000 MCG 1000 MCG: 1000 TAB at 08:55

## 2019-02-18 RX ADMIN — OXYCODONE HYDROCHLORIDE 10 MG: 10 TABLET ORAL at 06:00

## 2019-02-18 RX ADMIN — GABAPENTIN 300 MG: 300 CAPSULE ORAL at 08:55

## 2019-02-18 RX ADMIN — METHOCARBAMOL TABLETS 1000 MG: 500 TABLET, COATED ORAL at 18:08

## 2019-02-18 RX ADMIN — GABAPENTIN 300 MG: 300 CAPSULE ORAL at 15:05

## 2019-02-18 RX ADMIN — GABAPENTIN 300 MG: 300 CAPSULE ORAL at 20:47

## 2019-02-18 RX ADMIN — METHOCARBAMOL TABLETS 1000 MG: 500 TABLET, COATED ORAL at 13:16

## 2019-02-18 RX ADMIN — DOCUSATE SODIUM 100 MG: 100 CAPSULE, LIQUID FILLED ORAL at 08:55

## 2019-02-18 RX ADMIN — ZOLPIDEM TARTRATE 5 MG: 5 TABLET ORAL at 20:47

## 2019-02-18 RX ADMIN — FUROSEMIDE 40 MG: 40 TABLET ORAL at 11:39

## 2019-02-18 RX ADMIN — FERROUS SULFATE TAB 325 MG (65 MG ELEMENTAL FE) 325 MG: 325 (65 FE) TAB at 08:55

## 2019-02-18 RX ADMIN — METHOCARBAMOL TABLETS 1000 MG: 500 TABLET, COATED ORAL at 20:47

## 2019-02-18 RX ADMIN — ACETAMINOPHEN 1000 MG: 500 TABLET, FILM COATED ORAL at 15:05

## 2019-02-18 RX ADMIN — ASPIRIN 81 MG: 81 TABLET, COATED ORAL at 20:47

## 2019-02-18 RX ADMIN — MORPHINE SULFATE 30 MG: 30 TABLET, EXTENDED RELEASE ORAL at 20:47

## 2019-02-18 RX ADMIN — ACETAMINOPHEN 1000 MG: 500 TABLET, FILM COATED ORAL at 20:47

## 2019-02-18 RX ADMIN — OXYCODONE HYDROCHLORIDE 10 MG: 10 TABLET ORAL at 13:17

## 2019-02-18 RX ADMIN — MORPHINE SULFATE 30 MG: 30 TABLET, EXTENDED RELEASE ORAL at 08:55

## 2019-02-18 RX ADMIN — OXYCODONE HYDROCHLORIDE 10 MG: 10 TABLET ORAL at 20:47

## 2019-02-18 RX ADMIN — OXYCODONE HYDROCHLORIDE 10 MG: 10 TABLET ORAL at 08:55

## 2019-02-18 RX ADMIN — ACETAMINOPHEN 1000 MG: 500 TABLET, FILM COATED ORAL at 08:55

## 2019-02-18 RX ADMIN — ASPIRIN 81 MG: 81 TABLET, COATED ORAL at 06:00

## 2019-02-18 RX ADMIN — METHOCARBAMOL TABLETS 1000 MG: 500 TABLET, COATED ORAL at 08:55

## 2019-02-18 ASSESSMENT — PATIENT HEALTH QUESTIONNAIRE - PHQ9
1. LITTLE INTEREST OR PLEASURE IN DOING THINGS: NOT AT ALL
2. FEELING DOWN, DEPRESSED, IRRITABLE, OR HOPELESS: NOT AT ALL
SUM OF ALL RESPONSES TO PHQ9 QUESTIONS 1 AND 2: 0

## 2019-02-18 NOTE — PROGRESS NOTES
"Rehab Progress Note     Encounter Date: 2/18/2019    CC: left hip fracture; chronic pain    Interval Events (Subjective)  Patient reports she is doing well. She denies NVD. Denies SOB or cough. Her primary complaint is swelling into her right hand. Discussed will restart on Lasix, patient in agreement. Otherwise she reports therapy is going well.     Objective:  VITAL SIGNS: /72   Pulse 87   Temp 37.1 °C (98.7 °F) (Oral)   Resp 18   Ht 1.6 m (5' 3\")   Wt 87.5 kg (192 lb 14.4 oz)   LMP  (LMP Unknown)   SpO2 94%   BMI 34.17 kg/m²   Gen: NAD  Psych: Mood and affect appropriate  CV: RRR, no edema  Resp: CTAB, no upper airway sounds  Abd: NTND  Neuro: AOx4, able to  parallel bars and maintain left LE precautions with cueing     Recent Results (from the past 72 hour(s))   IRON/TOTAL IRON BIND    Collection Time: 02/15/19  1:23 PM   Result Value Ref Range    Iron 15 (L) 40 - 170 ug/dL    Total Iron Binding 252 250 - 450 ug/dL    % Saturation 6 (L) 15 - 55 %   FERRITIN    Collection Time: 02/15/19  1:23 PM   Result Value Ref Range    Ferritin 52.0 10.0 - 291.0 ng/mL   CBC WITHOUT DIFFERENTIAL    Collection Time: 02/15/19  1:23 PM   Result Value Ref Range    WBC 9.4 4.8 - 10.8 K/uL    RBC 3.09 (L) 4.20 - 5.40 M/uL    Hemoglobin 8.2 (L) 12.0 - 16.0 g/dL    Hematocrit 24.6 (L) 37.0 - 47.0 %    MCV 79.6 (L) 81.4 - 97.8 fL    MCH 26.5 (L) 27.0 - 33.0 pg    MCHC 33.3 (L) 33.6 - 35.0 g/dL    RDW 49.3 35.9 - 50.0 fL    Platelet Count 294 164 - 446 K/uL    MPV 10.0 9.0 - 12.9 fL   CBC with Differential    Collection Time: 02/16/19  5:52 AM   Result Value Ref Range    WBC 8.7 4.8 - 10.8 K/uL    RBC 2.98 (L) 4.20 - 5.40 M/uL    Hemoglobin 7.9 (L) 12.0 - 16.0 g/dL    Hematocrit 24.0 (L) 37.0 - 47.0 %    MCV 80.5 (L) 81.4 - 97.8 fL    MCH 26.5 (L) 27.0 - 33.0 pg    MCHC 32.9 (L) 33.6 - 35.0 g/dL    RDW 49.9 35.9 - 50.0 fL    Platelet Count 282 164 - 446 K/uL    MPV 10.1 9.0 - 12.9 fL    Neutrophils-Polys 53.60 " 44.00 - 72.00 %    Lymphocytes 31.00 22.00 - 41.00 %    Monocytes 9.20 0.00 - 13.40 %    Eosinophils 5.50 0.00 - 6.90 %    Basophils 0.50 0.00 - 1.80 %    Immature Granulocytes 0.20 0.00 - 0.90 %    Nucleated RBC 0.00 /100 WBC    Neutrophils (Absolute) 4.65 2.00 - 7.15 K/uL    Lymphs (Absolute) 2.69 1.00 - 4.80 K/uL    Monos (Absolute) 0.80 0.00 - 0.85 K/uL    Eos (Absolute) 0.48 0.00 - 0.51 K/uL    Baso (Absolute) 0.04 0.00 - 0.12 K/uL    Immature Granulocytes (abs) 0.02 0.00 - 0.11 K/uL    NRBC (Absolute) 0.00 K/uL   Comp Metabolic Panel (CMP)    Collection Time: 02/16/19  5:52 AM   Result Value Ref Range    Sodium 139 135 - 145 mmol/L    Potassium 3.6 3.6 - 5.5 mmol/L    Chloride 108 96 - 112 mmol/L    Co2 27 20 - 33 mmol/L    Anion Gap 4.0 0.0 - 11.9    Glucose 105 (H) 65 - 99 mg/dL    Bun 12 8 - 22 mg/dL    Creatinine 0.39 (L) 0.50 - 1.40 mg/dL    Calcium 8.4 (L) 8.5 - 10.5 mg/dL    AST(SGOT) 14 12 - 45 U/L    ALT(SGPT) 6 2 - 50 U/L    Alkaline Phosphatase 94 30 - 99 U/L    Total Bilirubin 0.3 0.1 - 1.5 mg/dL    Albumin 2.4 (L) 3.2 - 4.9 g/dL    Total Protein 4.9 (L) 6.0 - 8.2 g/dL    Globulin 2.5 1.9 - 3.5 g/dL    A-G Ratio 1.0 g/dL   HEMOGLOBIN A1C    Collection Time: 02/16/19  5:52 AM   Result Value Ref Range    Glycohemoglobin 5.0 0.0 - 5.6 %    Est Avg Glucose 97 mg/dL   TSH with Reflex to FT4    Collection Time: 02/16/19  5:52 AM   Result Value Ref Range    TSH 2.200 0.380 - 5.330 uIU/mL   ESTIMATED GFR    Collection Time: 02/16/19  5:52 AM   Result Value Ref Range    GFR If African American >60 >60 mL/min/1.73 m 2    GFR If Non African American >60 >60 mL/min/1.73 m 2       Current Facility-Administered Medications   Medication Frequency   • aspirin EC (ECOTRIN) tablet 81 mg BID   • furosemide (LASIX) tablet 40 mg DAILY   • [START ON 2/19/2019] potassium chloride SA (Kdur) tablet 20 mEq DAILY   • acetaminophen (TYLENOL) tablet 1,000 mg TID   • Respiratory Care per Protocol Continuous RT   • Pharmacy  Consult Request ...Pain Management Review 1 Each PHARMACY TO DOSE   • hydrALAZINE (APRESOLINE) tablet 25 mg Q8HRS PRN   • artificial tears 1.4 % ophthalmic solution 1 Drop PRN   • benzocaine-menthol (CEPACOL) lozenge 1 Lozenge Q2HRS PRN   • mag hydrox-al hydrox-simeth (MAALOX PLUS ES or MYLANTA DS) suspension 20 mL Q2HRS PRN   • ondansetron (ZOFRAN ODT) dispertab 4 mg 4X/DAY PRN    Or   • ondansetron (ZOFRAN) syringe/vial injection 4 mg 4X/DAY PRN   • traZODone (DESYREL) tablet 50 mg QHS PRN   • sodium chloride (OCEAN) 0.65 % nasal spray 2 Spray PRN   • docusate sodium (COLACE) capsule 100 mg BID   • scopolamine (TRANSDERM-SCOP) patch 1 Patch Q72HRS PRN   • tramadol (ULTRAM) 50 MG tablet 50 mg Q4HRS PRN   • zolpidem (AMBIEN) tablet 5 mg HS PRN   • cyanocobalamin (VITAMIN B12) tablet 1,000 mcg DAILY   • gabapentin (NEURONTIN) capsule 300 mg TID   • methocarbamol (ROBAXIN) tablet 1,000 mg 4X/DAY   • morphine ER (MS CONTIN) tablet 30 mg Q12HRS   • oxyCODONE immediate release (ROXICODONE) tablet 10 mg Q3HRS PRN   • [START ON 2/22/2019] vitamin D (Ergocalciferol) (DRISDOL) capsule 50,000 Units Q7 DAYS   • ferrous sulfate tablet 325 mg QDAY with Breakfast       Orders Placed This Encounter   Procedures   • Diet Order Regular     Standing Status:   Standing     Number of Occurrences:   1     Order Specific Question:   Diet:     Answer:   Regular [1]       Assessment:  Active Hospital Problems    Diagnosis   • *Status post left hip replacement   • Primary insomnia   • Edema   • Vitamin D deficiency   • DDD (degenerative disc disease), cervical- MOD-SEVERE SPINE NV- dr brennan; fusion and laminectomy C3-T1 12/5/2018 dr brennan   • Vitamin B 12 deficiency       Medical Decision Making and Plan:  Left hip fracture - s/p Left hip VILMA with Dr. Levine on 2/13/19. Was already scheduled for hip replacement when fall occurred. Recently at [facility] for rehab for cervical surgery with C4 AIS C injury.   -TTWB on LLE  -PT and OT for mobility  and ADLs     Pain - Patient on scheduled Tylenol, Morphine ER 15 mg BID, and Gabapentin. Also on Robaxin.      Edema - Previously on Lasix, will monitor  -Restart Lasix 40 mg daily, add K supplement.      Vitamin Deficiency - s/p gastric bypass. On B12 1000 mcg and Vitamin D 78398 weekly on transfer. Switch to cholecalciferol for better absorption.      Neurogenic bladder - Patient previously on Lasix for swelling and had incontinence. Will monitor and perform timed voiding if appropriate.      Obesity - Patient with BMI of 35 on admission. Will monitor weights.      Anemia - up to 8.2 prior to admission. 7.9 on admission. Continue to monitor      DVT ppx - Patient on SCDs at [facility] concern for bleeding with Hgb down to 7.6. Patient brought in her own SCDs and prefers to use them.     Total time:  25 minutes.  I spent greater than 50% of the time for patient care and coordination on this date, including unit/floor time, and face-to-face time with the patient as per assessment and plan above.  Discussion included anemia, edema and restarting lasix.     Erendira Harper M.D.

## 2019-02-18 NOTE — IDT DISCHARGE PLANNING
CASE MANAGEMENT INITIAL ASSESSMENT    Admit Date:  2/15/2019     Patient is well known to me from prior admission.  Patient is a  53 y.o. female transferred from [facility] following hip replacement surgery performed by Dr. Levine.  She has some support at home from her boyfriend and friends.  Case Management will follow to assist with plans.     Diagnosis: unilateral hip fracture  S/p left hip fracture    Co-morbidities:   Patient Active Problem List    Diagnosis Date Noted   • Mild intermittent asthma without complication 04/25/2012     Priority: High   • Status post left hip replacement 02/13/2019   • Primary insomnia 01/03/2019   • Mixed stress and urge urinary incontinence 01/03/2019   • Edema 12/13/2018   • Vitamin D deficiency 12/13/2018   • Anemia 12/13/2018   • Cervical myelopathy (HCC) 12/11/2018   • Encounter for work capability assessment- FMLA PAPERWORK 08/29/2018   • DDD (degenerative disc disease), cervical- MOD-SEVERE SPINE NV- dr brennan; fusion and laminectomy C3-T1 12/5/2018 dr brennan 08/09/2018   • Primary osteoarthritis of left hip- dr levine; left THR tbd feb 6, 2019 06/07/2018   • Obesity (BMI 30-39.9) 06/07/2018   • DDD (degenerative disc disease), lumbar 04/25/2018   • Uncomplicated opioid dependence (CMS-HCC)- kylah adkins 01/31/2018   • Essential hypertension 01/31/2018   • Venous insufficiency 10/03/2017   • Colon cancer screening- needs 08/09/2017   • Chronic bilateral low back pain with bilateral sciatica- pain mgt; KYLAH ADKINS 07/26/2017   • Vitamin B 12 deficiency 06/02/2016   • History of gastric bypass 04/25/2012     Prior Living Situation:  Housing / Facility: 1 Story Apartment / Condo  Lives with - Patient's Self Care Capacity: Alone and Unable to Care For Self    Prior Level of Function:  Medication Management: Independent  Finances: Requires Assist  Home Management: Requires Assist  Shopping: Requires Assist  Prior Level Of Mobility: Uses Wheel Chair for in Home Mobility, Supervision  With Device in Home, Independent With Device in Home  Driving / Transportation: Relatives / Others Provide Transportation    Support Systems:  Primary Boyfriend is Berto Ruiz at [phone]   Other support systems: son is Gaudencio Keys in [place] at [phone]     Previous Services Utilized:   Equipment Owned: Front-Wheel Walker, Single Point Cane, Wheelchair, Tub Transfer Bench  Prior Services: Skilled Home Health Services    Other Information:  Occupation (Pre-Hospital Vocational): Employed Full Time (Works for the state)  Primary Payor Source: Private Insurance  Primary Care Practitioner : Dr. Micky Rubin  Other MDs: Dr. Levine for orthopaedics    Additional Case Management Questions:  Have you ever received case management services for yourself or a family member? I followed patient in her last rehab admission in December.    Do you feel you have and an understanding of what services  provide?  Unable to determine at this time    Do you have any additional questions regarding case management?   Unable to determine at this time.        CASE MANAGEMENT PLAN OF CARE   Individualized Goals:   1. Case Management will confirm level of support for home.  2. Will follow for restart of home health.    Barriers:   1. Lives alone  2. Limited wt bearing.    Patient / Family Goal:  Patient / Family Goal: Patient lives alone.  Her boyfriend lives in [place].  She has supportive friends.  Patient was in skilled facililty following her last rehab stay.  She has used [facility] in the past.  She has needed dme items at home including a fww and w/c.    Plan:  1. Continue to follow patient through hospitalization and provide discharge planning in collaboration with patient, family, physicians and ancillary services.     2. Utilize community resources to ensure a safe discharge.

## 2019-02-18 NOTE — REHAB-DIETARY IDT TEAM NOTE
"Dietary   Nutrition       Dietary Problems (Active)            There are no active problems.        Nutrition services: Day 3 of admit.  Karine Ovalle is a 53 y.o. female with admitting DX of unilateral hip fracture (L).     Consult received for supplements and pt with noted wound.     Spoke with pt in room. Pt happy with the meals. Notes no current updates to preferences at this time. Regarding recent wt fluctuations, pt unaware, though stated she hopes she is losing wt.     Assessment:  Height: 160 cm (5' 3\")  Weight: 87.5 kg (192 lb 14.4 oz)  Body mass index is 34.17 kg/m².  Diet/Intake: Regular % x 7 and <50% x 1    Evaluation:   Meds: Vit B12 1000mcg, Colace 100mg BID, Ferrous Sulfate 325mg QAM, Vit D 70101 q 7 days, robaxin, has PRN oxycodone 10mg q3 hours (currently utilizing)  Skin: Pt with wound vac to L hip and noted wound to L buttock and reviewed with nursing. No WT consult at this time.   GI/: Last BM 2/15 and has scheduled colace   Labs: Hgb 7.9 Glu 105 Ca 8.4 A1C 5.0%   Pt with hx of gastric bypass.     Malnutrition Risk: No indication for malnutrition risk at this time.     Recommendations/Plan:  1. Diet as order. Current PO intake adequate.   2. Supplements not indicated at this time given adequate PO.   3. Encourage intake of 50% or greater.   4. Document intake of all meals  as % taken in ADL's to provide interdisciplinary communication across all shifts.   5. Monitor weight.  6. Nutrition rep will continue to see patient for ongoing meal and snack preferences.     RD to monitor per protocol. PO intake adequate and no supplements indicated at this time. RD available PRN.     Section completed by:  Ofelia Metcalf R.D.     "

## 2019-02-18 NOTE — CARE PLAN
Problem: Infection  Goal: Will remain free from infection  Outcome: PROGRESSING AS EXPECTED  Pt is able to verbalize s/s of infection: redness of area, fever, pain.

## 2019-02-18 NOTE — REHAB-PHARMACY IDT TEAM NOTE
Pharmacy   Pharmacy  Antibiotics/Antifungals/Antivirals:  Medication:      Active Orders     None        Route:        NA  Stop Date:  NA  Reason:      NA  Antihypertensives/Cardiac:  Medication:    Orders (72h ago through future)    Start     Ordered    02/18/19 1130  furosemide (LASIX) tablet 40 mg  DAILY      02/18/19 1111    02/15/19 1554  hydrALAZINE (APRESOLINE) tablet 25 mg  EVERY 8 HOURS PRN      02/15/19 1554        Patient Vitals for the past 24 hrs:   BP Pulse   02/18/19 0653 111/72 87   02/17/19 2049 - 100   02/17/19 1925 130/70 (!) 115   02/17/19 1400 145/83 94       Anticoagulation:  Medication: Aspirin    Other key medications: A review of the medication list reveals no issues at this time.    Section completed by: Anthony Milligan [facility]

## 2019-02-18 NOTE — PROGRESS NOTES
Received shift report and assumed care of patient.  Patient resting in bed with eyes closed, resp even and unlabored, calm and stable, currently positioned in bed for comfort and safety; call light within reach.  Shows no s/s  pain or discomfort at this time.  Will continue to monitor.

## 2019-02-18 NOTE — CARE PLAN
Problem: Communication  Goal: The ability to communicate needs accurately and effectively will improve  Makes needs known to staff.  Encouraged to use call light for staff assist.    Problem: Safety  Goal: Will remain free from falls  Call light kept within reach and encouraged to use for assistance and with toileting needs. Encouraged to call staff and to wait for staff before transfers.    Problem: Pain Management  Goal: Pain level will decrease to patient's comfort goal  Complains of pain to left neck and left hip medicated with scheduled MS Contin, Tylenol, Gabapentin and Robaxin, as well as prn Oxycodone.  Has + relief.  See MAR and doc flow sheet.  Will continue to monitor patient.

## 2019-02-18 NOTE — PROGRESS NOTES
Received bedside shift report from Ta ERICKSON RN regarding patient and assumed care. Patient awake, calm and stable, currently positioned in bed for comfort and safety; call light within reach. Denies pain or discomfort at this time. Will continue to monitor.

## 2019-02-19 LAB
VIT B1 BLD-MCNC: 61 NMOL/L (ref 70–180)
VIT B6 SERPL-MCNC: <5 NMOL/L (ref 20–125)

## 2019-02-19 PROCEDURE — 97110 THERAPEUTIC EXERCISES: CPT

## 2019-02-19 PROCEDURE — 99233 SBSQ HOSP IP/OBS HIGH 50: CPT | Performed by: PHYSICAL MEDICINE & REHABILITATION

## 2019-02-19 PROCEDURE — 700102 HCHG RX REV CODE 250 W/ 637 OVERRIDE(OP): Performed by: PHYSICAL MEDICINE & REHABILITATION

## 2019-02-19 PROCEDURE — 700112 HCHG RX REV CODE 229: Performed by: PHYSICAL MEDICINE & REHABILITATION

## 2019-02-19 PROCEDURE — 700102 HCHG RX REV CODE 250 W/ 637 OVERRIDE(OP): Performed by: HOSPITALIST

## 2019-02-19 PROCEDURE — 770010 HCHG ROOM/CARE - REHAB SEMI PRIVAT*

## 2019-02-19 PROCEDURE — 97535 SELF CARE MNGMENT TRAINING: CPT

## 2019-02-19 PROCEDURE — A9270 NON-COVERED ITEM OR SERVICE: HCPCS | Performed by: HOSPITALIST

## 2019-02-19 PROCEDURE — A9270 NON-COVERED ITEM OR SERVICE: HCPCS | Performed by: PHYSICAL MEDICINE & REHABILITATION

## 2019-02-19 PROCEDURE — 97530 THERAPEUTIC ACTIVITIES: CPT

## 2019-02-19 RX ADMIN — FERROUS SULFATE TAB 325 MG (65 MG ELEMENTAL FE) 325 MG: 325 (65 FE) TAB at 08:40

## 2019-02-19 RX ADMIN — ACETAMINOPHEN 1000 MG: 500 TABLET, FILM COATED ORAL at 15:06

## 2019-02-19 RX ADMIN — ASPIRIN 81 MG: 81 TABLET, COATED ORAL at 21:12

## 2019-02-19 RX ADMIN — METHOCARBAMOL TABLETS 1000 MG: 500 TABLET, COATED ORAL at 17:55

## 2019-02-19 RX ADMIN — ACETAMINOPHEN 1000 MG: 500 TABLET, FILM COATED ORAL at 08:39

## 2019-02-19 RX ADMIN — ACETAMINOPHEN 1000 MG: 500 TABLET, FILM COATED ORAL at 21:12

## 2019-02-19 RX ADMIN — ZOLPIDEM TARTRATE 5 MG: 5 TABLET ORAL at 00:08

## 2019-02-19 RX ADMIN — GABAPENTIN 300 MG: 300 CAPSULE ORAL at 15:06

## 2019-02-19 RX ADMIN — POTASSIUM CHLORIDE 20 MEQ: 1500 TABLET, EXTENDED RELEASE ORAL at 08:39

## 2019-02-19 RX ADMIN — MORPHINE SULFATE 30 MG: 30 TABLET, EXTENDED RELEASE ORAL at 21:12

## 2019-02-19 RX ADMIN — OXYCODONE HYDROCHLORIDE 10 MG: 10 TABLET ORAL at 06:07

## 2019-02-19 RX ADMIN — METHOCARBAMOL TABLETS 1000 MG: 500 TABLET, COATED ORAL at 21:12

## 2019-02-19 RX ADMIN — GABAPENTIN 300 MG: 300 CAPSULE ORAL at 08:40

## 2019-02-19 RX ADMIN — FUROSEMIDE 40 MG: 40 TABLET ORAL at 08:40

## 2019-02-19 RX ADMIN — OXYCODONE HYDROCHLORIDE 10 MG: 10 TABLET ORAL at 21:12

## 2019-02-19 RX ADMIN — CYANOCOBALAMIN TAB 1000 MCG 1000 MCG: 1000 TAB at 08:39

## 2019-02-19 RX ADMIN — VITAMIN D, TAB 1000IU (100/BT) 2000 UNITS: 25 TAB at 08:40

## 2019-02-19 RX ADMIN — METHOCARBAMOL TABLETS 1000 MG: 500 TABLET, COATED ORAL at 08:39

## 2019-02-19 RX ADMIN — OXYCODONE HYDROCHLORIDE 10 MG: 10 TABLET ORAL at 18:05

## 2019-02-19 RX ADMIN — METHOCARBAMOL TABLETS 1000 MG: 500 TABLET, COATED ORAL at 13:31

## 2019-02-19 RX ADMIN — ASPIRIN 81 MG: 81 TABLET, COATED ORAL at 08:39

## 2019-02-19 RX ADMIN — ZOLPIDEM TARTRATE 5 MG: 5 TABLET ORAL at 21:11

## 2019-02-19 RX ADMIN — DOCUSATE SODIUM 100 MG: 100 CAPSULE, LIQUID FILLED ORAL at 08:40

## 2019-02-19 RX ADMIN — OXYCODONE HYDROCHLORIDE 10 MG: 10 TABLET ORAL at 13:31

## 2019-02-19 RX ADMIN — MORPHINE SULFATE 30 MG: 30 TABLET, EXTENDED RELEASE ORAL at 08:40

## 2019-02-19 RX ADMIN — GABAPENTIN 300 MG: 300 CAPSULE ORAL at 21:12

## 2019-02-19 RX ADMIN — DOCUSATE SODIUM 100 MG: 100 CAPSULE, LIQUID FILLED ORAL at 21:12

## 2019-02-19 NOTE — REHAB-COLLABORATIVE ONGOING IDT TEAM NOTE
Weekly Interdisciplinary Team Conference Note    Weekly Interdisciplinary Team Conference # 1  Date:  2/19/2019    Clinicians present and reporting at team conference include the following:   MD: MARIANA Harper MD    RN:  Ta Parisi RN    PT:   Yareli Shepard, PT, DPT  OT:  Femi Garner MS OTR/L    ST:  Not Applicable.  Not following  CM:  LONDON HessW, DECLAN  REC:  Not Applicable  RT:  Not Applicable  RPh:  Not Applicable  Other:   None  All reporting clinicians have a working knowledge of this patient's plan of care.    Targeted DC Date:   3/1/2019 Friday.      Medical    Patient Active Problem List    Diagnosis Date Noted   • Mild intermittent asthma without complication 04/25/2012     Priority: High   • Status post left hip replacement 02/13/2019   • Primary insomnia 01/03/2019   • Mixed stress and urge urinary incontinence 01/03/2019   • Edema 12/13/2018   • Vitamin D deficiency 12/13/2018   • Anemia 12/13/2018   • Cervical myelopathy (HCC) 12/11/2018   • Encounter for work capability assessment- FMLA PAPERWORK 08/29/2018   • DDD (degenerative disc disease), cervical- [facility]- dr brennan; fusion and laminectomy C3-T1 12/5/2018 dr brennan 08/09/2018   • Primary osteoarthritis of left hip- dr nowak; left THR tbd feb 6, 2019 06/07/2018   • Obesity (BMI 30-39.9) 06/07/2018   • DDD (degenerative disc disease), lumbar 04/25/2018   • Uncomplicated opioid dependence (CMS-HCC)- teresa adkins 01/31/2018   • Essential hypertension 01/31/2018   • Venous insufficiency 10/03/2017   • Colon cancer screening- needs 08/09/2017   • Chronic bilateral low back pain with bilateral sciatica- pain mgt; TERESA ADKINS 07/26/2017   • Vitamin B 12 deficiency 06/02/2016   • History of gastric bypass 04/25/2012     Results     ** No results found for the last 24 hours. **           Nursing  Diet/Nutrition:  Regular and Thin Liquids  Medication Administration:  Whole with Liquid Wash  % consumed at meals in last 24 hours:   Consumed % of meals per documentation.  Meal/Snack Consumption for the past 24 hrs:   Oral Nutrition   02/18/19 0900 Breakfast;Between % Consumed       Snack schedule:  HS  Supplement:  Other: N/A  Appetite:  Good  Fluid Intake/Output in past 24 hours: In: 220 [P.O.:220]  Out: 900 Pt is also incontinent of urine.  Admit Weight:  Weight: 87.5 kg (192 lb 14.4 oz)  Weight Last 7 Days   [87.5 kg (192 lb 14.4 oz)] 87.5 kg (192 lb 14.4 oz) (02/15 1600)    Pain Issues:    Location:  Neck;Hip (02/18 2047)  Left (02/18 2047)         Severity:  Severe   Scheduled pain medications:  gabapentin (NEURONTIN)  and morphine SR (MS CONTIN)  Robaxin and Tylenol    PRN pain medications used in last 24 hours:  oxycodone immediate release (ROXICODONE)    Non Pharmacologic Interventions:  rest  Effectiveness of pain management plan:  good=patient states acceptable comfort after interventions    Bowel:    Bowel Assist Initial Score:  1 - Total Assistance  Bowel Assist Current Score:  1 - Total Assistance  Bowl Accidents in last 7 days:     Last bowel movement: 02/15/19        Usual bowel pattern:  daily  Scheduled bowel medications:  docusate sodium (COLACE)  PRN bowel medications used in last 24 hours:  None  Nursing Interventions:  Increased time, Scheduled medication, Positioning on commode/toilet, Brief, Supervision, Verbal cueing, Set-up, Emptying of device  Effectiveness of bowel program:   good=regular, predictable, controlled emptying of bowel  Bladder:    Bladder Assist Initial Score:  1 - Total Assistance  Bladder Assist Current Score:  1 - Total Assistance  Bladder Accidents in last 7 days:     PVR range for past 24-48 hours: -- ()  Intermittent Catheterization:  N/A  Medications affecting bladder:  None    Time void schedule/voiding pattern:  Voiding every 2-4 hours  Interventions:  Increased time, Verbal cueing, Urinal, Time void patient initiated, Brief  Effectiveness of bladder training:  Fair=occasional  unpredictable, incontinent emptying of bladder (< 1 time/day)    Wound:         Patient Lines/Drains/Airways Status    Active Current Wounds     Name: Placement date: Placement time: Site: Days:    S/P Left Hip Arthroplasty  Pressure Injury Left Buttock 02/13/19 02/13/19  1557   2000    44                   Interventions:  Woundvac in place to left hip.  Left Buttocks with Mepilex.  Effectiveness of intervention:  wound is improving     Wound Vac Location:  Left hip.  Dressing last changed:  Not applicable  Pump Mode Pressure Setting       Description of drainage:  Not applicable    Sleep/wake cycle:   Average hours slept:  Sleeps 3-4 hours without waking  Sleep medication usage:  zolpidem (AMBIEN) tablet 5 mg nightly    Patient/Family Training/Education:  Bladder Management/Training, Fall Prevention, General Self Care, Medication Management, Pain Management, Safe Transfers, Safety, Skin Care and Wound Care  Discharge Supply Recommendations:  Wound Care Supplies  Strengths: Alert and oriented, Pleasant and cooperative and Effective communication skills   Barriers:   Bladder incontinence, Generalized weakness and Limited mobility            Nursing Problems           Problem: Bowel/Gastric:     Goal: Normal bowel function is maintained or improved           Goal: Will not experience complications related to bowel motility             Problem: Communication     Goal: The ability to communicate needs accurately and effectively will improve             Problem: Discharge Barriers/Planning     Goal: Patient's continuum of care needs will be met             Problem: Infection     Goal: Will remain free from infection             Problem: Knowledge Deficit     Goal: Knowledge of disease process/condition, treatment plan, diagnostic tests, and medications will improve           Goal: Knowledge of the prescribed therapeutic regimen will improve             Problem: Mobility     Goal: Risk for activity intolerance will  decrease             Problem: Pain Management     Goal: Pain level will decrease to patient's comfort goal     Flowsheet:     Taken at 02/16/19 0217    Pain Rating Scale (NPRS) Can't bear the pain unable to do anything by Kerwin Encinas R.N.    Non Verbal Scale  Calm by Kerwin Encinas R.N.    Comfort Goal Comfort at Rest;Sleep Comfortably by Kerwin Encinas R.N.                  Problem: Safety     Goal: Will remain free from injury           Goal: Will remain free from falls             Problem: Skin Integrity     Goal: Risk for impaired skin integrity will decrease             Problem: Venous Thromboembolism (VTW)/Deep Vein Thrombosis (DVT) Prevention:     Goal: Patient will participate in Venous Thrombosis (VTE)/Deep Vein Thrombosis (DVT)Prevention Measures                  Long Term Goals:   At discharge patient will be able to function safely at home and in the community with support.    Section completed by:  Kimberly Moya L.P.N.2              Mobility  Bed mobility:   Mod A for B LEs  Bed /Chair/Wheelchair Transfer Initial:  3 - Moderate Assistance  Bed /Chair/Wheelchair Transfer Current:  3 - Moderate Assistance   Bed/Chair/Wheelchair Transfer Description:  Adaptive equipment, Increased time, Supervision for safety, Verbal cueing, Assist with one limb, Set-up of equipment  Walk Initial:  1 - Total Assistance  Walk Current:  1 - Total Assistance   Walk Description:  Assist device/equipment, Extra time, Two hand rails, Verbal cueing, Safety concerns, Supervision for safety, Requires wheelchair follow  Wheelchair Initial:  2 - Max Assistance  Wheelchair Current:  2 - Max Assistance   Wheelchair Description:  Extra time, Verbal cueing, Supervision for safety, Requires incidental assist  Stairs Initial:  0 - Not tested,medical condition  Stairs Current: 0 - Not tested,medical condition   Stairs Description:    Patient/Family Training/Education:  Ongoing to include TTWB edu, post hip prec edu,  mobility training  DME/DC Recommendations:  Manual wc, ramp to enter home, HH PT, FWW. AD to include leg , grab bars.  Strengths:  Willingly participates in therapeutic activities  Barriers:   Other: TTWB L LE, anxious/perseverative behavior, post hip prec, body habitus  # of short term goals set= 3  # of short term goals met=1/3       Physical Therapy Problems           Problem: Mobility     Dates: Start: 02/16/19       Goal: STG-Within one week, patient will     Dates: Start: 02/16/19       Description: 1) Individualized goal: Gait FWW cga, 55 ft 1 week  2) Interventions:  PT Gait Training, PT Therapeutic Exercises and PT Therapeutic Activity               Problem: PT-Long Term Goals     Dates: Start: 02/16/19       Goal: LTG-By discharge, patient will ambulate     Dates: Start: 02/16/19       Description: 1) Individualized goal: Gait fww spv 200 ft at discharge  2) Interventions:  PT Gait Training, PT Therapeutic Exercises and PT Therapeutic Activity             Goal: LTG-By discharge, patient will transfer one surface to another     Dates: Start: 02/16/19       Description: 1) Individualized goal: Transfer one surface to another fww supervision at discharge  2) Interventions:  PT Gait Training, PT Therapeutic Exercises and PT Therapeutic Activity             Goal: LTG-By discharge, patient will transfer in/out of a car     Dates: Start: 02/16/19       Description: 1) Individualized goal: Car transfer fww supervision at discharge  2) Interventions:  PT Gait Training, PT Therapeutic Exercises and PT Therapeutic Activity                     Section completed by:  Yareli Shepard DPT       Activities of Daily Living  Eating Initial:  7 - Independent  Eating Current:  5 - Standby Prompting/Supervision or Set-up   Eating Description:   (set up (cutting) and extra time)  Grooming Initial:  4 - Minimal Assistance  Grooming Current:  5 - Standby Prompting/Supervision or Set-up   Grooming Description:   (oral care  )  Bathing Initial:  3 - Moderate Assistance  Bathing Current:  3 - Moderate Assistance   Bathing Description:  Grab bar, Tub bench, Long handled bath tool, Hand held shower  Upper Body Dressing Initial:  3 - Moderate Assistance  Upper Body Dressing Current:  3 - Moderate Assistance   Upper Body Dressing Description:   (assist with pulling over head and down back   chamosoil and pull over shirt )  Lower Body Dressing Initial:  2 - Max Assistance  Lower Body Dressing Current:  1 - Total Assistance   Lower Body Dressing Description:  1 - Total Assistance  Toileting Initial:  1 - Total Assistance  Toileting Current:  2 - Max Assistance   Toileting Description:   (total assist for hygiene, CGA during clothing management with support of FWW)  Toilet Transfer Initial:  3 - Moderate Assistance  Toilet Transfer Current:  4 - Minimal Assistance   Toilet Transfer Description:  4 - Minimal Assistance  Tub / Shower Transfer Initial:  4 - Minimal Assistance  Tub / Shower Transfer Current:  4 - Minimal Assistance   Tub / Shower Transfer Description:  Grab bar ( TTWB left LE)  IADL: not yet addressed   Family Training/Education:  Not yet addressed   DME/DC Recommendations:  Hip kit   BSC over toilet  Shower bench     Strengths:  Able to follow instructions, Effective communication skills, Making steady progress towards goals, Pleasant and cooperative and Willingly participates in therapeutic activities  Barriers:  Decreased endurance, Generalized weakness, Limited mobility, Poor activity tolerance, Poor balance and Other: decreased right UE functional use,   self distracting with conversation , technology  etc.      # of short term goals set= 9    # of short term goals met=0          Occupational Therapy Goals           Problem: Bathing     Dates: Start: 02/16/19       Goal: STG-Within one week, patient will bathe     Dates: Start: 02/16/19   Expected End: 02/23/19       Description: 1) Individualized Goal: at a level of Min A on  shower bench.  2) Interventions:  OT Self Care/ADL, OT Manual Ther Technique, OT Neuro Re-Ed/Balance, OT Therapeutic Activity, OT Evaluation and OT Therapeutic Exercise       Note:     Goal Note filed on 02/19/19 1309 by Isaiah Montalvo, C.O.T.A.    Goal: STG-Within one week, patient will bathe  Outcome: NOT MET  Requires Mod assist    limited by  Limited right UE function  Post . Hip precautions  Decreased   strength/endurance    continue goal                   Problem: Dressing     Dates: Start: 02/16/19       Goal: STG-Within one week, patient will dress UB     Dates: Start: 02/16/19   Expected End: 02/23/19       Description: 1) Individualized Goal:  At a level of CGA.   2) Interventions:  OT Self Care/ADL, OT Manual Ther Technique, OT Neuro Re-Ed/Balance, OT Therapeutic Activity, OT Evaluation and OT Therapeutic Exercise       Note:     Goal Note filed on 02/19/19 1309 by Isaiah Montalvo, C.O.T.A.    Goal: STG-Within one week, patient will dress UB  Outcome: NOT MET   Mod assist   limited by  Limited right UE function  Post . Hip precautions  Decreased   strength/endurance    continue goal                   Goal: STG-Within one week, patient will dress LB     Dates: Start: 02/16/19   Expected End: 02/23/19       Description: 1) Individualized Goal:  At a level of Mod A with use of AE.   2) Interventions:  OT Self Care/ADL, OT Manual Ther Technique, OT Therapeutic Activity, OT Evaluation and OT Therapeutic Exercise       Note:     Goal Note filed on 02/19/19 1309 by Isaiah Montalvo, C.O.T.A.    Goal: STG-Within one week, patient will dress LB  Outcome: NOT MET   Total assist   limited by  Limited right UE function  Post . Hip precautions  Decreased   strength/endurance    continue goal                   Goal: STG-Within one week, patient will retrieve clothing     Dates: Start: 02/16/19   Expected End: 02/23/19       Description: 1) Individualized Goal:  At a level of SBA.   2) Interventions:  OT E Stim Attended,  OT Self Care/ADL, OT Manual Ther Technique, OT Neuro Re-Ed/Balance, OT Therapeutic Activity, OT Evaluation and OT Therapeutic Exercise       Note:     Goal Note filed on 02/19/19 1309 by Isaiah Montalvo, C.O.T.A.    Goal: STG-Within one week, patient will retrieve clothing  Outcome: NOT MET   Staff retrieves clothing   limited by  Limited right UE function  Post . Hip precautions  Decreased   strength/endurance    continue goal                     Problem: Functional Transfers     Dates: Start: 02/16/19       Goal: STG-Within one week, patient will transfer to toilet     Dates: Start: 02/16/19   Expected End: 02/23/19       Description: 1) Individualized Goal:  At a level of CGA.   2) Interventions:  OT Self Care/ADL, OT Manual Ther Technique, OT Neuro Re-Ed/Balance, OT Therapeutic Activity, OT Evaluation and OT Therapeutic Exercise       Note:     Goal Note filed on 02/19/19 1309 by Isaiah Montalvo, C.O.T.A.    Goal: STG-Within one week, patient will transfer to toilet  Outcome: NOT MET    Min assist   limited by  Limited right UE function  Post . Hip precautions  Decreased   strength/endurance    continue goal                   Goal: STG-Within one week, patient will transfer to tub/shower     Dates: Start: 02/16/19   Expected End: 02/23/19       Description: 1) Individualized Goal:  At a level of SBA.   2) Interventions:  OT Self Care/ADL, OT Community Reintegration, OT Neuro Re-Ed/Balance, OT Therapeutic Activity and OT Therapeutic Exercise       Note:     Goal Note filed on 02/19/19 1302 by Isaiah Montalvo, C.O.T.A.    Goal: STG-Within one week, patient will transfer to tub/shower  Outcome: NOT MET  Min/ mod assist   limited by  Limited right UE function  Post . Hip precautions  Decreased   strength/endurance    continue goal                     Problem: Grooming     Dates: Start: 02/16/19       Goal: STG-Within one week, patient will complete grooming     Dates: Start: 02/16/19   Expected End: 02/23/19        Description: 1) Individualized Goal:  At sinkside at a level of SBA assist.   2) Interventions:  OT E Stim Attended, OT Self Care/ADL, OT Manual Ther Technique, OT Neuro Re-Ed/Balance, OT Therapeutic Activity, OT Evaluation and OT Therapeutic Exercise       Note:     Goal Note filed on 02/19/19 1309 by Isaiah Montalvo, C.O.T.A.    Goal: STG-Within one week, patient will complete grooming  Outcome: NOT MET  Min assist   ( hair care)                   Problem: IADL's     Dates: Start: 02/16/19       Goal: STG-Within one week, patient will access kitchen area     Dates: Start: 02/16/19   Expected End: 02/23/19       Description: 1) Individualized Goal: at a level of CGA.   2) Interventions:  OT Self Care/ADL, OT Community Reintegration, OT Manual Ther Technique, OT Neuro Re-Ed/Balance, OT Therapeutic Activity and OT Therapeutic Exercise       Note:     Goal Note filed on 02/19/19 1309 by Isaiah Montalvo, C.O.T.A.    Goal: STG-Within one week, patient will access kitchen area  Outcome: NOT MET  Not yet addressed                     Problem: OT Long Term Goals     Dates: Start: 02/16/19       Goal: LTG-By discharge, patient will complete basic self care tasks     Dates: Start: 02/16/19   Expected End: 03/02/19       Description: 1) Individualized Goal:  At a level of Min A.   2) Interventions:  OT Self Care/ADL, OT Neuro Re-Ed/Balance, OT Therapeutic Activity, OT Evaluation and OT Therapeutic Exercise             Goal: LTG-By discharge, patient will perform bathroom transfers     Dates: Start: 02/16/19   Expected End: 03/02/19       Description: 1) Individualized Goal:  At a level of Mod I.   2) Interventions:  OT E Stim Attended, OT Group Therapy, OT Self Care/ADL, OT Community Reintegration, OT Neuro Re-Ed/Balance, OT Therapeutic Activity, OT Evaluation and OT Therapeutic Exercise             Goal: LTG-By discharge, patient will complete basic home management     Dates: Start: 02/16/19   Expected End: 03/02/19        "Description: 1) Individualized Goal:  At a level of Min A.   2) Interventions:  OT Self Care/ADL, OT Community Reintegration, OT Manual Ther Technique, OT Neuro Re-Ed/Balance, and OT Therapeutic Activity.                  Problem: Toileting     Dates: Start: 02/16/19       Goal: STG-Within one week, patient will complete toileting tasks     Dates: Start: 02/16/19   Expected End: 02/23/19       Description: 1) Individualized Goal:  At a level of CGA.   2) Interventions:  OT Self Care/ADL, OT Community Reintegration, OT Manual Ther Technique, OT Neuro Re-Ed/Balance, OT Therapeutic Activity and OT Therapeutic Exercise       Note:     Goal Note filed on 02/19/19 5494 by Isaiah Montalvo C.O.T.A.    Goal: STG-Within one week, patient will complete toileting tasks  Outcome: NOT MET   Max assist   limited by  Limited right UE function  Post . Hip precautions  Decreased   strength/endurance    continue goal                         Section completed by:  Isaiah Montalvo, C.O.T.A.             Nutrition       Dietary Problems (Active)            There are no active problems.        Nutrition services: Day 3 of admit.  Karine Ovalle is a 53 y.o. female with admitting DX of unilateral hip fracture (L).     Consult received for supplements and pt with noted wound.     Spoke with pt in room. Pt happy with the meals. Notes no current updates to preferences at this time. Regarding recent wt fluctuations, pt unaware, though stated she hopes she is losing wt.     Assessment:  Height: 160 cm (5' 3\")  Weight: 87.5 kg (192 lb 14.4 oz)  Body mass index is 34.17 kg/m².  Diet/Intake: Regular % x 7 and <50% x 1    Evaluation:   Meds: Vit B12 1000mcg, Colace 100mg BID, Ferrous Sulfate 325mg QAM, Vit D 69270 q 7 days, robaxin, has PRN oxycodone 10mg q3 hours (currently utilizing)  Skin: Pt with wound vac to L hip and noted wound to L buttock and reviewed with nursing. No WT consult at this time.   GI/: Last BM 2/15 and has " scheduled colace   Labs: Hgb 7.9 Glu 105 Ca 8.4 A1C 5.0%   Pt with hx of gastric bypass.     Malnutrition Risk: No indication for malnutrition risk at this time.     Recommendations/Plan:  1. Diet as order. Current PO intake adequate.   2. Supplements not indicated at this time given adequate PO.   3. Encourage intake of 50% or greater.   4. Document intake of all meals  as % taken in ADL's to provide interdisciplinary communication across all shifts.   5. Monitor weight.  6. Nutrition rep will continue to see patient for ongoing meal and snack preferences.     RD to monitor per protocol. PO intake adequate and no supplements indicated at this time. RD available PRN.     Section completed by:  Ofelia Metcalf R.D.    REHAB-Pharmacy IDT Team Note by Anthony Milligan RPH at 2/18/2019  1:28 PM  Version 1 of 1    Author:  Anthony Milligan RPH Service:  (none) Author Type:  Pharmacist    Filed:  2/18/2019  1:56 PM Date of Service:  2/18/2019  1:28 PM Status:  Signed    :  Anthony Milligan RPH (Pharmacist)         Pharmacy   Pharmacy  Antibiotics/Antifungals/Antivirals:  Medication:      Active Orders     None        Route:        NA  Stop Date:  NA  Reason:      NA  Antihypertensives/Cardiac:  Medication:    Orders (72h ago through future)    Start     Ordered    02/18/19 1130  furosemide (LASIX) tablet 40 mg  DAILY      02/18/19 1111    02/15/19 1554  hydrALAZINE (APRESOLINE) tablet 25 mg  EVERY 8 HOURS PRN      02/15/19 1554        Patient Vitals for the past 24 hrs:   BP Pulse   02/18/19 0653 111/72 87   02/17/19 2049 - 100   02/17/19 1925 130/70 (!) 115   02/17/19 1400 145/83 94       Anticoagulation:  Medication: Aspirin    Other key medications: A review of the medication list reveals no issues at this time.    Section completed by: Anthony Milligan RPh[AW.1]     Attribution Key     AW.1 - Anthony Milligan RPH on 2/18/2019  1:28 PM                    DC Planning  DC destination/dispostion:  Return home  where she lives alone; 1st floor apartment/bath shower combo w/ grab bars; 1 step from car port to sidewalk; 1 small step into her apartment/  PT has all dme at home.  Has used [facility] in the past.     Referrals:  None at this time.     DC Needs:  Follow up with PCP, Dr. Abner Brown; anticipate home health.     Barriers to discharge:   TTWB LLE; lives alone; son lives in [place]; functional status.       Strengths:  Alert/oriented/ self directs her care;    Section completed by:  GERMAN Kovacs< [facility]     Case Management Summary:  Pt can be self limiting at times; eli lee min assist w/ toileting, wound care consult; gait in parallel  bars; total assist w/ LE dressing; dc next Friday 3/1/2019 with home health for PT/OT/RN .        Physician Summary  MARIANA Harper MD  participated and led team conference discussion.

## 2019-02-19 NOTE — PROGRESS NOTES
"Rehab Progress Note     Encounter Date: 2/19/2019    CC: left hip fracture; chronic pain    Interval Events (Subjective)  Patient sitting up in room. She reports she has a lot of questions/requests from CM.  Discussed with her that CM would be by sometime today if possible or tomorrow.  Discussed will have IDT today and patient is adamant she will need 2 weeks of rehabilitation.  Discussed would depend on findings in the IDT meeting and how therapy felt she was progressing.  Patient otherwise report pain is controlled.     IDT Team Meeting 2/19/2019    IErendira M.D., was present and led the interdisciplinary team conference on 2/19/2019.  I led the IDT conference and agree with the IDT conference documentation and plan of care as noted below.     RN:  Diet Regular   % Meal     Pain    Sleep    Bowel Continent   Bladder incontinent; wants to use female urinal   In's & Out's    Wound consult    PT: 1 of 3 goals  Bed Mobility    Transfers    Mobility maxA   Stairs    Self limiting behaviors  Walking in parallel bars   Limited by precautions    OT:  Eating    Grooming    Bathing    UB Dressing    LB Dressing totA   Toileting    Shower & Transfer Took 75 minutes; distractions   Met 0 of 3 goals    SLP:  Not following    CM:  Continues to work on disposition and DME needs.      DC/Disposition:  3/1/19    Objective:  VITAL SIGNS: /78   Pulse 91   Temp 36.6 °C (97.9 °F) (Temporal)   Resp 18   Ht 1.6 m (5' 3\")   Wt 87.5 kg (192 lb 14.4 oz)   LMP  (LMP Unknown)   SpO2 96%   BMI 34.17 kg/m²   Gen: NAD  Psych: Mood and affect appropriate  CV: RRR, 2+ in BLE and right hand 1+  Resp: CTAB, no upper airway sounds  Abd: NTND  Neuro: AOx4, following simple commands    No results found for this or any previous visit (from the past 72 hour(s)).    Current Facility-Administered Medications   Medication Frequency   • aspirin EC (ECOTRIN) tablet 81 mg BID   • furosemide (LASIX) tablet 40 mg DAILY   • " potassium chloride SA (Kdur) tablet 20 mEq DAILY   • vitamin D (cholecalciferol) tablet 2,000 Units DAILY   • acetaminophen (TYLENOL) tablet 1,000 mg TID   • Respiratory Care per Protocol Continuous RT   • Pharmacy Consult Request ...Pain Management Review 1 Each PHARMACY TO DOSE   • hydrALAZINE (APRESOLINE) tablet 25 mg Q8HRS PRN   • artificial tears 1.4 % ophthalmic solution 1 Drop PRN   • benzocaine-menthol (CEPACOL) lozenge 1 Lozenge Q2HRS PRN   • mag hydrox-al hydrox-simeth (MAALOX PLUS ES or MYLANTA DS) suspension 20 mL Q2HRS PRN   • ondansetron (ZOFRAN ODT) dispertab 4 mg 4X/DAY PRN    Or   • ondansetron (ZOFRAN) syringe/vial injection 4 mg 4X/DAY PRN   • traZODone (DESYREL) tablet 50 mg QHS PRN   • sodium chloride (OCEAN) 0.65 % nasal spray 2 Spray PRN   • docusate sodium (COLACE) capsule 100 mg BID   • scopolamine (TRANSDERM-SCOP) patch 1 Patch Q72HRS PRN   • tramadol (ULTRAM) 50 MG tablet 50 mg Q4HRS PRN   • zolpidem (AMBIEN) tablet 5 mg HS PRN   • cyanocobalamin (VITAMIN B12) tablet 1,000 mcg DAILY   • gabapentin (NEURONTIN) capsule 300 mg TID   • methocarbamol (ROBAXIN) tablet 1,000 mg 4X/DAY   • morphine ER (MS CONTIN) tablet 30 mg Q12HRS   • oxyCODONE immediate release (ROXICODONE) tablet 10 mg Q3HRS PRN   • ferrous sulfate tablet 325 mg QDAY with Breakfast       Orders Placed This Encounter   Procedures   • Diet Order Regular     Standing Status:   Standing     Number of Occurrences:   1     Order Specific Question:   Diet:     Answer:   Regular [1]       Assessment:  Active Hospital Problems    Diagnosis   • *Status post left hip replacement   • Primary insomnia   • Edema   • Vitamin D deficiency   • DDD (degenerative disc disease), cervical- MOD-SEVERE SPINE [facility]- dr brennan; fusion and laminectomy C3-T1 12/5/2018 dr brennan   • Vitamin B 12 deficiency       Medical Decision Making and Plan:  Left hip fracture - s/p Left hip VILMA with Dr. Levine on 2/13/19. Was already scheduled for hip replacement when  fall occurred. Recently at [facility] for rehab for cervical surgery with C4 AIS C injury.   -TTWB on LLE  -PT and OT for mobility and ADLs     Pain - Patient on scheduled Tylenol, Morphine ER 15 mg BID, and Gabapentin. Also on Robaxin.      Edema - Previously on Lasix, will monitor  -Restart Lasix 40 mg daily, add K supplement. Improvement in hand edema     Vitamin Deficiency - s/p gastric bypass. On B12 1000 mcg and Vitamin D 87860 weekly on transfer. Switch to cholecalciferol for better absorption.      Neurogenic bladder - Patient previously on Lasix for swelling and had incontinence. Will monitor and perform timed voiding if appropriate.       Obesity - Patient with BMI of 35 on admission. Will monitor weights.      Anemia - up to 8.2 prior to admission. 7.9 on admission. Continue to monitor - recheck on 2/20/19     DVT ppx - Patient on SCDs at [facility] concern for bleeding with Hgb down to 7.6. Patient brought in her own SCDs and prefers to use them.     Dispo - Patient to discharge home on 3/1/19. Patient working on getting short term disability paperwork completed. Discussed will need to request medical records.     Total time:  35 minutes.  I spent greater than 50% of the time for patient care, counseling, and coordination on this date, including unit/floor time, and face-to-face time with the patient as per interval events and assessment and plan above. Topics discussed included discharge planning, continue Lasix for swelling and completed paperwork and letter for disability. Recheck CBC in AM. Patient was discussed separately in IDT today; please see details above.    Erendira Harper M.D.

## 2019-02-19 NOTE — DISCHARGE PLANNING
Case Managemt.   I met with pt introducing myself as her  during stay @ Rehab.     She states she need to file an [employer] for her short term disability, and has provided me w/ information for the claim.  Will review and assist as I can.  As requested, I have provided her with the billing number for [facility] indicating she needs to submit billing info to [employer].       PT has all dme at home; grab bars installed in bathroom; lives alone-So able to assist w/ transition home; has had home health in the past.   PT's reports her phone has been turned off, and she has an appt that she is able to receive texts/calls on .  New     Plan:    IDT today.

## 2019-02-19 NOTE — REHAB-OT IDT TEAM NOTE
Occupational Therapy   Activities of Daily Living  Eating Initial:  7 - Independent  Eating Current:  5 - Standby Prompting/Supervision or Set-up   Eating Description:   (set up (cutting) and extra time)  Grooming Initial:  4 - Minimal Assistance  Grooming Current:  5 - Standby Prompting/Supervision or Set-up   Grooming Description:   (oral care )  Bathing Initial:  3 - Moderate Assistance  Bathing Current:  3 - Moderate Assistance   Bathing Description:  Grab bar, Tub bench, Long handled bath tool, Hand held shower  Upper Body Dressing Initial:  3 - Moderate Assistance  Upper Body Dressing Current:  3 - Moderate Assistance   Upper Body Dressing Description:   (assist with pulling over head and down back   chamosoil and pull over shirt )  Lower Body Dressing Initial:  2 - Max Assistance  Lower Body Dressing Current:  1 - Total Assistance   Lower Body Dressing Description:  1 - Total Assistance  Toileting Initial:  1 - Total Assistance  Toileting Current:  2 - Max Assistance   Toileting Description:   (total assist for hygiene, CGA during clothing management with support of FWW)  Toilet Transfer Initial:  3 - Moderate Assistance  Toilet Transfer Current:  4 - Minimal Assistance   Toilet Transfer Description:  4 - Minimal Assistance  Tub / Shower Transfer Initial:  4 - Minimal Assistance  Tub / Shower Transfer Current:  4 - Minimal Assistance   Tub / Shower Transfer Description:  Grab bar ( TTWB left LE)  IADL: not yet addressed   Family Training/Education:  Not yet addressed   DME/DC Recommendations:  Hip kit   BSC over toilet  Shower bench     Strengths:  Able to follow instructions, Effective communication skills, Making steady progress towards goals, Pleasant and cooperative and Willingly participates in therapeutic activities  Barriers:  Decreased endurance, Generalized weakness, Limited mobility, Poor activity tolerance, Poor balance and Other: decreased right UE functional use,   self distracting with  conversation , technology  etc.      # of short term goals set= 9    # of short term goals met=0          Occupational Therapy Goals           Problem: Bathing     Dates: Start: 02/16/19       Goal: STG-Within one week, patient will bathe     Dates: Start: 02/16/19   Expected End: 02/23/19       Description: 1) Individualized Goal: at a level of Min A on shower bench.  2) Interventions:  OT Self Care/ADL, OT Manual Ther Technique, OT Neuro Re-Ed/Balance, OT Therapeutic Activity, OT Evaluation and OT Therapeutic Exercise       Note:     Goal Note filed on 02/19/19 1309 by Isaiah Montalvo, C.O.T.A.    Goal: STG-Within one week, patient will bathe  Outcome: NOT MET  Requires Mod assist    limited by  Limited right UE function  Post . Hip precautions  Decreased   strength/endurance    continue goal                   Problem: Dressing     Dates: Start: 02/16/19       Goal: STG-Within one week, patient will dress UB     Dates: Start: 02/16/19   Expected End: 02/23/19       Description: 1) Individualized Goal:  At a level of CGA.   2) Interventions:  OT Self Care/ADL, OT Manual Ther Technique, OT Neuro Re-Ed/Balance, OT Therapeutic Activity, OT Evaluation and OT Therapeutic Exercise       Note:     Goal Note filed on 02/19/19 1309 by Isaiah Montalvo, C.O.T.A.    Goal: STG-Within one week, patient will dress UB  Outcome: NOT MET   Mod assist   limited by  Limited right UE function  Post . Hip precautions  Decreased   strength/endurance    continue goal                   Goal: STG-Within one week, patient will dress LB     Dates: Start: 02/16/19   Expected End: 02/23/19       Description: 1) Individualized Goal:  At a level of Mod A with use of AE.   2) Interventions:  OT Self Care/ADL, OT Manual Ther Technique, OT Therapeutic Activity, OT Evaluation and OT Therapeutic Exercise       Note:     Goal Note filed on 02/19/19 1309 by Isaiah Montalvo, C.O.T.A.    Goal: STG-Within one week, patient will dress LB  Outcome: NOT MET    Total assist   limited by  Limited right UE function  Post . Hip precautions  Decreased   strength/endurance    continue goal                   Goal: STG-Within one week, patient will retrieve clothing     Dates: Start: 02/16/19   Expected End: 02/23/19       Description: 1) Individualized Goal:  At a level of SBA.   2) Interventions:  OT E Stim Attended, OT Self Care/ADL, OT Manual Ther Technique, OT Neuro Re-Ed/Balance, OT Therapeutic Activity, OT Evaluation and OT Therapeutic Exercise       Note:     Goal Note filed on 02/19/19 1309 by Isaiah Montalvo, C.O.T.A.    Goal: STG-Within one week, patient will retrieve clothing  Outcome: NOT MET   Staff retrieves clothing   limited by  Limited right UE function  Post . Hip precautions  Decreased   strength/endurance    continue goal                     Problem: Functional Transfers     Dates: Start: 02/16/19       Goal: STG-Within one week, patient will transfer to toilet     Dates: Start: 02/16/19   Expected End: 02/23/19       Description: 1) Individualized Goal:  At a level of CGA.   2) Interventions:  OT Self Care/ADL, OT Manual Ther Technique, OT Neuro Re-Ed/Balance, OT Therapeutic Activity, OT Evaluation and OT Therapeutic Exercise       Note:     Goal Note filed on 02/19/19 1309 by Isaiah Montalvo, C.O.T.A.    Goal: STG-Within one week, patient will transfer to toilet  Outcome: NOT MET    Min assist   limited by  Limited right UE function  Post . Hip precautions  Decreased   strength/endurance    continue goal                   Goal: STG-Within one week, patient will transfer to tub/shower     Dates: Start: 02/16/19   Expected End: 02/23/19       Description: 1) Individualized Goal:  At a level of SBA.   2) Interventions:  OT Self Care/ADL, OT Community Reintegration, OT Neuro Re-Ed/Balance, OT Therapeutic Activity and OT Therapeutic Exercise       Note:     Goal Note filed on 02/19/19 1309 by Isaiah Montalvo, C.O.T.A.    Goal: STG-Within one week, patient will  transfer to tub/shower  Outcome: NOT MET  Min/ mod assist   limited by  Limited right UE function  Post . Hip precautions  Decreased   strength/endurance    continue goal                     Problem: Grooming     Dates: Start: 02/16/19       Goal: STG-Within one week, patient will complete grooming     Dates: Start: 02/16/19   Expected End: 02/23/19       Description: 1) Individualized Goal:  At sinkside at a level of SBA assist.   2) Interventions:  OT E Stim Attended, OT Self Care/ADL, OT Manual Ther Technique, OT Neuro Re-Ed/Balance, OT Therapeutic Activity, OT Evaluation and OT Therapeutic Exercise       Note:     Goal Note filed on 02/19/19 1309 by Isaiah Montalvo, C.O.T.A.    Goal: STG-Within one week, patient will complete grooming  Outcome: NOT MET  Min assist   ( hair care)                   Problem: IADL's     Dates: Start: 02/16/19       Goal: STG-Within one week, patient will access kitchen area     Dates: Start: 02/16/19   Expected End: 02/23/19       Description: 1) Individualized Goal: at a level of CGA.   2) Interventions:  OT Self Care/ADL, OT Community Reintegration, OT Manual Ther Technique, OT Neuro Re-Ed/Balance, OT Therapeutic Activity and OT Therapeutic Exercise       Note:     Goal Note filed on 02/19/19 1309 by Isaiah Montalvo, C.O.T.A.    Goal: STG-Within one week, patient will access kitchen area  Outcome: NOT MET  Not yet addressed                     Problem: OT Long Term Goals     Dates: Start: 02/16/19       Goal: LTG-By discharge, patient will complete basic self care tasks     Dates: Start: 02/16/19   Expected End: 03/02/19       Description: 1) Individualized Goal:  At a level of Min A.   2) Interventions:  OT Self Care/ADL, OT Neuro Re-Ed/Balance, OT Therapeutic Activity, OT Evaluation and OT Therapeutic Exercise             Goal: LTG-By discharge, patient will perform bathroom transfers     Dates: Start: 02/16/19   Expected End: 03/02/19       Description: 1) Individualized Goal:   At a level of Mod I.   2) Interventions:  OT E Stim Attended, OT Group Therapy, OT Self Care/ADL, OT Community Reintegration, OT Neuro Re-Ed/Balance, OT Therapeutic Activity, OT Evaluation and OT Therapeutic Exercise             Goal: LTG-By discharge, patient will complete basic home management     Dates: Start: 02/16/19   Expected End: 03/02/19       Description: 1) Individualized Goal:  At a level of Min A.   2) Interventions:  OT Self Care/ADL, OT Community Reintegration, OT Manual Ther Technique, OT Neuro Re-Ed/Balance, and OT Therapeutic Activity.                  Problem: Toileting     Dates: Start: 02/16/19       Goal: STG-Within one week, patient will complete toileting tasks     Dates: Start: 02/16/19   Expected End: 02/23/19       Description: 1) Individualized Goal:  At a level of CGA.   2) Interventions:  OT Self Care/ADL, OT Community Reintegration, OT Manual Ther Technique, OT Neuro Re-Ed/Balance, OT Therapeutic Activity and OT Therapeutic Exercise       Note:     Goal Note filed on 02/19/19 1755 by Isaiah Montalvo, C.O.T.A.    Goal: STG-Within one week, patient will complete toileting tasks  Outcome: NOT MET   Max assist   limited by  Limited right UE function  Post . Hip precautions  Decreased   strength/endurance    continue goal                         Section completed by:  Isaiah Montalvo, C.O.T.A./Gunnar Quigley M.S., OT/ANAT

## 2019-02-19 NOTE — CARE PLAN
Problem: Safety  Goal: Will remain free from injury  Outcome: PROGRESSING AS EXPECTED  Pt uses call light consistently and appropriately. Waits for assistance does not attempt self transfer this shift. Able to verbalize needs.

## 2019-02-19 NOTE — CARE PLAN
Problem: Mobility  Goal: STG-Within one week, patient will  1) Individualized goal: Gait FWW cga, 55 ft 1 week  2) Interventions:  PT Gait Training, PT Therapeutic Exercises and PT Therapeutic Activity     Outcome: NOT MET      Problem: Mobility Transfers  Goal: STG-Within one week, patient will transfer bed to chair  1) Individualized goal: Transfer bed to/from chair fww cga 1 week  2) Interventions:  PT Gait Training, PT Therapeutic Exercises and PT Therapeutic Activity     Outcome: MET Date Met: 02/19/19    Goal: STG-Within one week, patient will move supine to sit  1) Individualized goal: Transfer supine to/from sit cga 1 week  2) Interventions:  PT Gait Training, PT Therapeutic Exercises and PT Therapeutic Activity     Outcome: MET Date Met: 02/19/19  Mod A for B LE management

## 2019-02-19 NOTE — DISCHARGE PLANNING
Case Management/   Met with pt provided update from IDT and dc date planned for 3/1 w/ home health for RN/PT/OT plus follow up with PCP/Dr. Chavez.   PT in agreement.     Provided her letter to provide to her insurance company for her short term disability.   Advised her she will need to contact medical records in order for release of additional medical records to include dc summary/h&p.   She needed additional forms copied which I did and gave back to her.   Pt states her niece will assisting with obtaining medical records for her [employer] claim.

## 2019-02-19 NOTE — CARE PLAN
Problem: Safety  Goal: Will remain free from falls  Reviewed fall and safety precautions with patient and reminded patient to call for assistance before getting out of bed. Patient verbalized understanding and has not attempted self transfer this shift.

## 2019-02-19 NOTE — REHAB-NURSING IDT TEAM NOTE
Nursing   Nursing  Diet/Nutrition:  Regular and Thin Liquids  Medication Administration:  Whole with Liquid Wash  % consumed at meals in last 24 hours:  Consumed % of meals per documentation.  Meal/Snack Consumption for the past 24 hrs:   Oral Nutrition   02/18/19 0900 Breakfast;Between % Consumed       Snack schedule:  HS  Supplement:  Other: N/A  Appetite:  Good  Fluid Intake/Output in past 24 hours: In: 220 [P.O.:220]  Out: 900 Pt is also incontinent of urine.  Admit Weight:  Weight: 87.5 kg (192 lb 14.4 oz)  Weight Last 7 Days   [87.5 kg (192 lb 14.4 oz)] 87.5 kg (192 lb 14.4 oz) (02/15 1600)    Pain Issues:    Location:  Neck;Hip (02/18 2047)  Left (02/18 2047)         Severity:  Severe   Scheduled pain medications:  gabapentin (NEURONTIN)  and morphine SR (MS CONTIN)  Robaxin and Tylenol    PRN pain medications used in last 24 hours:  oxycodone immediate release (ROXICODONE)    Non Pharmacologic Interventions:  rest  Effectiveness of pain management plan:  good=patient states acceptable comfort after interventions    Bowel:    Bowel Assist Initial Score:  1 - Total Assistance  Bowel Assist Current Score:  1 - Total Assistance  Bowl Accidents in last 7 days:     Last bowel movement: 02/15/19        Usual bowel pattern:  daily  Scheduled bowel medications:  docusate sodium (COLACE)  PRN bowel medications used in last 24 hours:  None  Nursing Interventions:  Increased time, Scheduled medication, Positioning on commode/toilet, Brief, Supervision, Verbal cueing, Set-up, Emptying of device  Effectiveness of bowel program:   good=regular, predictable, controlled emptying of bowel  Bladder:    Bladder Assist Initial Score:  1 - Total Assistance  Bladder Assist Current Score:  1 - Total Assistance  Bladder Accidents in last 7 days:     PVR range for past 24-48 hours: -- ()  Intermittent Catheterization:  N/A  Medications affecting bladder:  None    Time void schedule/voiding pattern:  Voiding every 2-4  hours  Interventions:  Increased time, Verbal cueing, Urinal, Time void patient initiated, Brief  Effectiveness of bladder training:  Fair=occasional unpredictable, incontinent emptying of bladder (< 1 time/day)    Wound:         Patient Lines/Drains/Airways Status    Active Current Wounds     Name: Placement date: Placement time: Site: Days:    S/P Left Hip Arthroplasty  Pressure Injury Left Buttock 02/13/19 02/13/19  1557   2000    44                   Interventions:  Woundvac in place to left hip.  Left Buttocks with Mepilex.  Effectiveness of intervention:  wound is improving     Wound Vac Location:  Left hip.  Dressing last changed:  Not applicable  Pump Mode Pressure Setting       Description of drainage:  Not applicable    Sleep/wake cycle:   Average hours slept:  Sleeps 3-4 hours without waking  Sleep medication usage:  zolpidem (AMBIEN) tablet 5 mg nightly    Patient/Family Training/Education:  Bladder Management/Training, Fall Prevention, General Self Care, Medication Management, Pain Management, Safe Transfers, Safety, Skin Care and Wound Care  Discharge Supply Recommendations:  Wound Care Supplies  Strengths: Alert and oriented, Pleasant and cooperative and Effective communication skills   Barriers:   Bladder incontinence, Generalized weakness and Limited mobility            Nursing Problems           Problem: Bowel/Gastric:     Goal: Normal bowel function is maintained or improved           Goal: Will not experience complications related to bowel motility             Problem: Communication     Goal: The ability to communicate needs accurately and effectively will improve             Problem: Discharge Barriers/Planning     Goal: Patient's continuum of care needs will be met             Problem: Infection     Goal: Will remain free from infection             Problem: Knowledge Deficit     Goal: Knowledge of disease process/condition, treatment plan, diagnostic tests, and medications will improve            Goal: Knowledge of the prescribed therapeutic regimen will improve             Problem: Mobility     Goal: Risk for activity intolerance will decrease             Problem: Pain Management     Goal: Pain level will decrease to patient's comfort goal     Flowsheet:     Taken at 02/16/19 0217    Pain Rating Scale (NPRS) Can't bear the pain unable to do anything by Kerwin Encinas R.N.    Non Verbal Scale  Calm by Kerwin Encinas R.N.    Comfort Goal Comfort at Rest;Sleep Comfortably by Kerwin Encinas R.N.                  Problem: Safety     Goal: Will remain free from injury           Goal: Will remain free from falls             Problem: Skin Integrity     Goal: Risk for impaired skin integrity will decrease             Problem: Venous Thromboembolism (VTW)/Deep Vein Thrombosis (DVT) Prevention:     Goal: Patient will participate in Venous Thrombosis (VTE)/Deep Vein Thrombosis (DVT)Prevention Measures                  Long Term Goals:   At discharge patient will be able to function safely at home and in the community with support.    Section completed by:  Kimberly Moya L.P.N.2

## 2019-02-19 NOTE — REHAB-PT IDT TEAM NOTE
Physical Therapy   Mobility  Bed mobility:   Mod A for B LEs  Bed /Chair/Wheelchair Transfer Initial:  3 - Moderate Assistance  Bed /Chair/Wheelchair Transfer Current:  3 - Moderate Assistance   Bed/Chair/Wheelchair Transfer Description:  Adaptive equipment, Increased time, Supervision for safety, Verbal cueing, Assist with one limb, Set-up of equipment  Walk Initial:  1 - Total Assistance  Walk Current:  1 - Total Assistance   Walk Description:  Assist device/equipment, Extra time, Two hand rails, Verbal cueing, Safety concerns, Supervision for safety, Requires wheelchair follow  Wheelchair Initial:  2 - Max Assistance  Wheelchair Current:  2 - Max Assistance   Wheelchair Description:  Extra time, Verbal cueing, Supervision for safety, Requires incidental assist  Stairs Initial:  0 - Not tested,medical condition  Stairs Current: 0 - Not tested,medical condition   Stairs Description:    Patient/Family Training/Education:  Ongoing to include TTWB edu, post hip prec edu, mobility training  DME/DC Recommendations:  Manual wc, ramp to enter home, HH PT, FWW. AD to include leg , grab bars.  Strengths:  Willingly participates in therapeutic activities  Barriers:   Other: TTWB L LE, anxious/perseverative behavior, post hip prec, body habitus  # of short term goals set= 3  # of short term goals met=1/3       Physical Therapy Problems           Problem: Mobility     Dates: Start: 02/16/19       Goal: STG-Within one week, patient will     Dates: Start: 02/16/19       Description: 1) Individualized goal: Gait FWW cga, 55 ft 1 week  2) Interventions:  PT Gait Training, PT Therapeutic Exercises and PT Therapeutic Activity               Problem: PT-Long Term Goals     Dates: Start: 02/16/19       Goal: LTG-By discharge, patient will ambulate     Dates: Start: 02/16/19       Description: 1) Individualized goal: Gait fww spv 200 ft at discharge  2) Interventions:  PT Gait Training, PT Therapeutic Exercises and PT Therapeutic  Activity             Goal: LTG-By discharge, patient will transfer one surface to another     Dates: Start: 02/16/19       Description: 1) Individualized goal: Transfer one surface to another fww supervision at discharge  2) Interventions:  PT Gait Training, PT Therapeutic Exercises and PT Therapeutic Activity             Goal: LTG-By discharge, patient will transfer in/out of a car     Dates: Start: 02/16/19       Description: 1) Individualized goal: Car transfer fww supervision at discharge  2) Interventions:  PT Gait Training, PT Therapeutic Exercises and PT Therapeutic Activity                     Section completed by:  Yareli Shepard DPT

## 2019-02-19 NOTE — CARE PLAN
Problem: Grooming  Goal: STG-Within one week, patient will complete grooming  1) Individualized Goal:  At sinkside at a level of SBA assist.   2) Interventions:  OT E Stim Attended, OT Self Care/ADL, OT Manual Ther Technique, OT Neuro Re-Ed/Balance, OT Therapeutic Activity, OT Evaluation and OT Therapeutic Exercise     Outcome: NOT MET  Min assist   ( hair care)     Problem: Bathing  Goal: STG-Within one week, patient will bathe  1) Individualized Goal: at a level of Min A on shower bench.  2) Interventions:  OT Self Care/ADL, OT Manual Ther Technique, OT Neuro Re-Ed/Balance, OT Therapeutic Activity, OT Evaluation and OT Therapeutic Exercise     Outcome: NOT MET  Requires Mod assist    limited by  Limited right UE function  Post . Hip precautions  Decreased strength/endurance    continue goal     Problem: Dressing  Goal: STG-Within one week, patient will dress UB  1) Individualized Goal:  At a level of CGA.   2) Interventions:  OT Self Care/ADL, OT Manual Ther Technique, OT Neuro Re-Ed/Balance, OT Therapeutic Activity, OT Evaluation and OT Therapeutic Exercise     Outcome: NOT MET   Mod assist   limited by  Limited right UE function  Post . Hip precautions  Decreased strength/endurance    continue goal     Goal: STG-Within one week, patient will dress LB  1) Individualized Goal:  At a level of Mod A with use of AE.   2) Interventions:  OT Self Care/ADL, OT Manual Ther Technique, OT Therapeutic Activity, OT Evaluation and OT Therapeutic Exercise     Outcome: NOT MET   Total assist   limited by  Limited right UE function  Post . Hip precautions  Decreased strength/endurance    continue goal     Goal: STG-Within one week, patient will retrieve clothing  1) Individualized Goal:  At a level of SBA.   2) Interventions:  OT E Stim Attended, OT Self Care/ADL, OT Manual Ther Technique, OT Neuro Re-Ed/Balance, OT Therapeutic Activity, OT Evaluation and OT Therapeutic Exercise     Outcome: NOT MET   Staff retrieves clothing    limited by  Limited right UE function  Post . Hip precautions  Decreased strength/endurance    continue goal       Problem: Toileting  Goal: STG-Within one week, patient will complete toileting tasks  1) Individualized Goal:  At a level of CGA.   2) Interventions:  OT Self Care/ADL, OT Community Reintegration, OT Manual Ther Technique, OT Neuro Re-Ed/Balance, OT Therapeutic Activity and OT Therapeutic Exercise     Outcome: NOT MET   Max assist   limited by  Limited right UE function  Post . Hip precautions  Decreased strength/endurance    continue goal       Problem: Functional Transfers  Goal: STG-Within one week, patient will transfer to toilet  1) Individualized Goal:  At a level of CGA.   2) Interventions:  OT Self Care/ADL, OT Manual Ther Technique, OT Neuro Re-Ed/Balance, OT Therapeutic Activity, OT Evaluation and OT Therapeutic Exercise     Outcome: NOT MET    Min assist   limited by  Limited right UE function  Post . Hip precautions  Decreased strength/endurance    continue goal     Goal: STG-Within one week, patient will transfer to tub/shower  1) Individualized Goal:  At a level of SBA.   2) Interventions:  OT Self Care/ADL, OT Community Reintegration, OT Neuro Re-Ed/Balance, OT Therapeutic Activity and OT Therapeutic Exercise     Outcome: NOT MET  Min/ mod assist   limited by  Limited right UE function  Post . Hip precautions  Decreased strength/endurance    continue goal       Problem: IADL's  Goal: STG-Within one week, patient will access kitchen area  1) Individualized Goal: at a level of CGA.   2) Interventions:  OT Self Care/ADL, OT Community Reintegration, OT Manual Ther Technique, OT Neuro Re-Ed/Balance, OT Therapeutic Activity and OT Therapeutic Exercise     Outcome: NOT MET  Not yet addressed

## 2019-02-19 NOTE — REHAB-CM IDT TEAM NOTE
Case Management    DC Planning  DC destination/dispostion:  Return home where she lives alone; 1st floor apartment/bath shower combo w/ grab bars; 1 step from car port to sidewalk; 1 small step into her apartment/  PT has all dme at home.  Has used [facility] in the past.     Referrals:  None at this time.     DC Needs:  Follow up with PCP, Dr. Abner Brown; anticipate home health.     Barriers to discharge:   TTWB LLE; lives alone; son lives in [place]; functional status.       Strengths:  Alert/oriented/ self directs her care;    Section completed by:  GERMAN Kovacs< CCM

## 2019-02-19 NOTE — DISCHARGE PLANNING
Case Management.   Requested letter provided to pt confirming her hospitalization.   Provided her w/ billing contact @ [facility]  Billing as she reports she needs to get a copy to submit to insurance co w/ her short term disability.

## 2019-02-20 LAB
BASOPHILS # BLD AUTO: 0.7 % (ref 0–1.8)
BASOPHILS # BLD: 0.05 K/UL (ref 0–0.12)
EOSINOPHIL # BLD AUTO: 0.4 K/UL (ref 0–0.51)
EOSINOPHIL NFR BLD: 5.6 % (ref 0–6.9)
ERYTHROCYTE [DISTWIDTH] IN BLOOD BY AUTOMATED COUNT: 49.1 FL (ref 35.9–50)
HCT VFR BLD AUTO: 23.2 % (ref 37–47)
HGB BLD-MCNC: 7.8 G/DL (ref 12–16)
IMM GRANULOCYTES # BLD AUTO: 0.03 K/UL (ref 0–0.11)
IMM GRANULOCYTES NFR BLD AUTO: 0.4 % (ref 0–0.9)
LYMPHOCYTES # BLD AUTO: 2.08 K/UL (ref 1–4.8)
LYMPHOCYTES NFR BLD: 29.4 % (ref 22–41)
MCH RBC QN AUTO: 26.6 PG (ref 27–33)
MCHC RBC AUTO-ENTMCNC: 33.6 G/DL (ref 33.6–35)
MCV RBC AUTO: 79.2 FL (ref 81.4–97.8)
MONOCYTES # BLD AUTO: 0.64 K/UL (ref 0–0.85)
MONOCYTES NFR BLD AUTO: 9 % (ref 0–13.4)
NEUTROPHILS # BLD AUTO: 3.88 K/UL (ref 2–7.15)
NEUTROPHILS NFR BLD: 54.9 % (ref 44–72)
NRBC # BLD AUTO: 0 K/UL
NRBC BLD-RTO: 0 /100 WBC
PLATELET # BLD AUTO: 389 K/UL (ref 164–446)
PMV BLD AUTO: 9.4 FL (ref 9–12.9)
RBC # BLD AUTO: 2.93 M/UL (ref 4.2–5.4)
WBC # BLD AUTO: 7.1 K/UL (ref 4.8–10.8)

## 2019-02-20 PROCEDURE — 770010 HCHG ROOM/CARE - REHAB SEMI PRIVAT*

## 2019-02-20 PROCEDURE — 97116 GAIT TRAINING THERAPY: CPT

## 2019-02-20 PROCEDURE — 700102 HCHG RX REV CODE 250 W/ 637 OVERRIDE(OP): Performed by: PHYSICAL MEDICINE & REHABILITATION

## 2019-02-20 PROCEDURE — 99232 SBSQ HOSP IP/OBS MODERATE 35: CPT | Performed by: PHYSICAL MEDICINE & REHABILITATION

## 2019-02-20 PROCEDURE — 700112 HCHG RX REV CODE 229: Performed by: PHYSICAL MEDICINE & REHABILITATION

## 2019-02-20 PROCEDURE — 97530 THERAPEUTIC ACTIVITIES: CPT

## 2019-02-20 PROCEDURE — A9270 NON-COVERED ITEM OR SERVICE: HCPCS | Performed by: PHYSICAL MEDICINE & REHABILITATION

## 2019-02-20 PROCEDURE — 36415 COLL VENOUS BLD VENIPUNCTURE: CPT

## 2019-02-20 PROCEDURE — 700102 HCHG RX REV CODE 250 W/ 637 OVERRIDE(OP): Performed by: HOSPITALIST

## 2019-02-20 PROCEDURE — 85025 COMPLETE CBC W/AUTO DIFF WBC: CPT

## 2019-02-20 PROCEDURE — 97535 SELF CARE MNGMENT TRAINING: CPT

## 2019-02-20 PROCEDURE — 97110 THERAPEUTIC EXERCISES: CPT

## 2019-02-20 PROCEDURE — A9270 NON-COVERED ITEM OR SERVICE: HCPCS | Performed by: HOSPITALIST

## 2019-02-20 RX ORDER — BISACODYL 10 MG
10 SUPPOSITORY, RECTAL RECTAL
Status: DISCONTINUED | OUTPATIENT
Start: 2019-02-20 | End: 2019-03-02 | Stop reason: HOSPADM

## 2019-02-20 RX ORDER — AMOXICILLIN 250 MG
1 CAPSULE ORAL 2 TIMES DAILY PRN
Status: DISCONTINUED | OUTPATIENT
Start: 2019-02-20 | End: 2019-03-02 | Stop reason: HOSPADM

## 2019-02-20 RX ORDER — POLYETHYLENE GLYCOL 3350 17 G/17G
1 POWDER, FOR SOLUTION ORAL
Status: DISCONTINUED | OUTPATIENT
Start: 2019-02-20 | End: 2019-03-02 | Stop reason: HOSPADM

## 2019-02-20 RX ADMIN — POTASSIUM CHLORIDE 20 MEQ: 1500 TABLET, EXTENDED RELEASE ORAL at 09:15

## 2019-02-20 RX ADMIN — MORPHINE SULFATE 30 MG: 30 TABLET, EXTENDED RELEASE ORAL at 21:06

## 2019-02-20 RX ADMIN — FUROSEMIDE 40 MG: 40 TABLET ORAL at 09:16

## 2019-02-20 RX ADMIN — DOCUSATE SODIUM 100 MG: 100 CAPSULE, LIQUID FILLED ORAL at 09:15

## 2019-02-20 RX ADMIN — VITAMIN D, TAB 1000IU (100/BT) 2000 UNITS: 25 TAB at 09:15

## 2019-02-20 RX ADMIN — MORPHINE SULFATE 30 MG: 30 TABLET, EXTENDED RELEASE ORAL at 09:16

## 2019-02-20 RX ADMIN — ASPIRIN 81 MG: 81 TABLET, COATED ORAL at 21:06

## 2019-02-20 RX ADMIN — DOCUSATE SODIUM 100 MG: 100 CAPSULE, LIQUID FILLED ORAL at 21:05

## 2019-02-20 RX ADMIN — METHOCARBAMOL TABLETS 1000 MG: 500 TABLET, COATED ORAL at 21:05

## 2019-02-20 RX ADMIN — GABAPENTIN 300 MG: 300 CAPSULE ORAL at 21:06

## 2019-02-20 RX ADMIN — GABAPENTIN 300 MG: 300 CAPSULE ORAL at 09:16

## 2019-02-20 RX ADMIN — OXYCODONE HYDROCHLORIDE 10 MG: 10 TABLET ORAL at 14:40

## 2019-02-20 RX ADMIN — GABAPENTIN 300 MG: 300 CAPSULE ORAL at 14:40

## 2019-02-20 RX ADMIN — ACETAMINOPHEN 1000 MG: 500 TABLET, FILM COATED ORAL at 14:40

## 2019-02-20 RX ADMIN — CYANOCOBALAMIN TAB 1000 MCG 1000 MCG: 1000 TAB at 09:16

## 2019-02-20 RX ADMIN — ACETAMINOPHEN 1000 MG: 500 TABLET, FILM COATED ORAL at 21:05

## 2019-02-20 RX ADMIN — POLYETHYLENE GLYCOL 3350 1 PACKET: 17 POWDER, FOR SOLUTION ORAL at 14:41

## 2019-02-20 RX ADMIN — FERROUS SULFATE TAB 325 MG (65 MG ELEMENTAL FE) 325 MG: 325 (65 FE) TAB at 09:16

## 2019-02-20 RX ADMIN — OXYCODONE HYDROCHLORIDE 10 MG: 10 TABLET ORAL at 19:23

## 2019-02-20 RX ADMIN — ASPIRIN 81 MG: 81 TABLET, COATED ORAL at 09:16

## 2019-02-20 RX ADMIN — METHOCARBAMOL TABLETS 1000 MG: 500 TABLET, COATED ORAL at 09:16

## 2019-02-20 RX ADMIN — METHOCARBAMOL TABLETS 1000 MG: 500 TABLET, COATED ORAL at 13:25

## 2019-02-20 RX ADMIN — METHOCARBAMOL TABLETS 1000 MG: 500 TABLET, COATED ORAL at 17:16

## 2019-02-20 RX ADMIN — ACETAMINOPHEN 1000 MG: 500 TABLET, FILM COATED ORAL at 09:16

## 2019-02-20 ASSESSMENT — GAIT ASSESSMENTS
GAIT LEVEL OF ASSIST: CONTACT GUARD ASSIST
DEVIATION: OTHER (COMMENT);ANTALGIC
ASSISTIVE DEVICE: PARALLEL BARS
DISTANCE (FEET): 10

## 2019-02-20 NOTE — CARE PLAN
Problem: Communication  Goal: The ability to communicate needs accurately and effectively will improve  Outcome: PROGRESSING AS EXPECTED  Plan of care for the day discussed this morning with pt. All questions and concerns answered at this time. Pt reports sleeping well last night. Will continue to monitor    Intervention: Educate patient and significant other/support system about the plan of care, procedures, treatments, medications and allow for questions  Pt has been manipulative with staff time. Pt informed that she will need to be up for therapies. Pt attempting to negotiate getting out of bed and doing therapy after her pain meds. Pt given all meds including pain meds with pt up in chair

## 2019-02-20 NOTE — PROGRESS NOTES
"Rehab Progress Note     Encounter Date: 2/20/2019    CC: left hip fracture; chronic pain    Interval Events (Subjective)  Patient sitting up in room. She reports she is doing well. Continues to have frequency and poor ability to transfer to toilet.  Discussed would fill out additional paperwork but she will need to request operative reports from main. Patient with constipation added additional PRN medications and RN to encourage patient.     IDT Team Meeting 2/19/2019  DC/Disposition:  3/1/19    Objective:  VITAL SIGNS: /58   Pulse 91   Temp 36.6 °C (97.9 °F) (Oral)   Resp 18   Ht 1.6 m (5' 3\")   Wt 87.5 kg (192 lb 14.4 oz)   LMP  (LMP Unknown)   SpO2 96%   BMI 34.17 kg/m²   Gen: NAD  Psych: Mood and affect appropriate  CV: RRR, 1+ edema in BLE vs body habitus  Resp: CTAB, no upper airway sounds  Abd: NTND  Neuro: AOx4, pushing wheelchair in room    Recent Results (from the past 72 hour(s))   CBC WITH DIFFERENTIAL    Collection Time: 02/20/19  5:53 AM   Result Value Ref Range    WBC 7.1 4.8 - 10.8 K/uL    RBC 2.93 (L) 4.20 - 5.40 M/uL    Hemoglobin 7.8 (L) 12.0 - 16.0 g/dL    Hematocrit 23.2 (L) 37.0 - 47.0 %    MCV 79.2 (L) 81.4 - 97.8 fL    MCH 26.6 (L) 27.0 - 33.0 pg    MCHC 33.6 33.6 - 35.0 g/dL    RDW 49.1 35.9 - 50.0 fL    Platelet Count 389 164 - 446 K/uL    MPV 9.4 9.0 - 12.9 fL    Neutrophils-Polys 54.90 44.00 - 72.00 %    Lymphocytes 29.40 22.00 - 41.00 %    Monocytes 9.00 0.00 - 13.40 %    Eosinophils 5.60 0.00 - 6.90 %    Basophils 0.70 0.00 - 1.80 %    Immature Granulocytes 0.40 0.00 - 0.90 %    Nucleated RBC 0.00 /100 WBC    Neutrophils (Absolute) 3.88 2.00 - 7.15 K/uL    Lymphs (Absolute) 2.08 1.00 - 4.80 K/uL    Monos (Absolute) 0.64 0.00 - 0.85 K/uL    Eos (Absolute) 0.40 0.00 - 0.51 K/uL    Baso (Absolute) 0.05 0.00 - 0.12 K/uL    Immature Granulocytes (abs) 0.03 0.00 - 0.11 K/uL    NRBC (Absolute) 0.00 K/uL       Current Facility-Administered Medications   Medication Frequency   • " senna-docusate (PERICOLACE or SENOKOT S) 8.6-50 MG per tablet 1 Tab BID PRN   • polyethylene glycol/lytes (MIRALAX) PACKET 1 Packet QDAY PRN   • magnesium hydroxide (MILK OF MAGNESIA) suspension 30 mL QDAY PRN   • bisacodyl (DULCOLAX) suppository 10 mg QDAY PRN   • aspirin EC (ECOTRIN) tablet 81 mg BID   • furosemide (LASIX) tablet 40 mg DAILY   • potassium chloride SA (Kdur) tablet 20 mEq DAILY   • vitamin D (cholecalciferol) tablet 2,000 Units DAILY   • acetaminophen (TYLENOL) tablet 1,000 mg TID   • Respiratory Care per Protocol Continuous RT   • Pharmacy Consult Request ...Pain Management Review 1 Each PHARMACY TO DOSE   • hydrALAZINE (APRESOLINE) tablet 25 mg Q8HRS PRN   • artificial tears 1.4 % ophthalmic solution 1 Drop PRN   • benzocaine-menthol (CEPACOL) lozenge 1 Lozenge Q2HRS PRN   • mag hydrox-al hydrox-simeth (MAALOX PLUS ES or MYLANTA DS) suspension 20 mL Q2HRS PRN   • ondansetron (ZOFRAN ODT) dispertab 4 mg 4X/DAY PRN    Or   • ondansetron (ZOFRAN) syringe/vial injection 4 mg 4X/DAY PRN   • traZODone (DESYREL) tablet 50 mg QHS PRN   • sodium chloride (OCEAN) 0.65 % nasal spray 2 Spray PRN   • docusate sodium (COLACE) capsule 100 mg BID   • scopolamine (TRANSDERM-SCOP) patch 1 Patch Q72HRS PRN   • tramadol (ULTRAM) 50 MG tablet 50 mg Q4HRS PRN   • zolpidem (AMBIEN) tablet 5 mg HS PRN   • cyanocobalamin (VITAMIN B12) tablet 1,000 mcg DAILY   • gabapentin (NEURONTIN) capsule 300 mg TID   • methocarbamol (ROBAXIN) tablet 1,000 mg 4X/DAY   • morphine ER (MS CONTIN) tablet 30 mg Q12HRS   • oxyCODONE immediate release (ROXICODONE) tablet 10 mg Q3HRS PRN   • ferrous sulfate tablet 325 mg QDAY with Breakfast       Orders Placed This Encounter   Procedures   • Diet Order Regular     Standing Status:   Standing     Number of Occurrences:   1     Order Specific Question:   Diet:     Answer:   Regular [1]       Assessment:  Active Hospital Problems    Diagnosis   • *Status post left hip replacement   • Primary  insomnia   • Edema   • Vitamin D deficiency   • DDD (degenerative disc disease), cervical- MOD-SEVERE SPINE [facility]- dr brennan; fusion and laminectomy C3-T1 12/5/2018 dr brennna   • Vitamin B 12 deficiency       Medical Decision Making and Plan:  Left hip fracture - s/p Left hip VILMA with Dr. Levine on 2/13/19. Was already scheduled for hip replacement when fall occurred. Recently at [facility] for rehab for cervical surgery with C4 AIS C injury.   -TTWB on LLE  -PT and OT for mobility and ADLs     Pain - Patient on scheduled Tylenol, Morphine ER 15 mg BID, and Gabapentin. Also on Robaxin.      Edema - Previously on Lasix, will monitor  -Restart Lasix 40 mg daily, add K supplement. Improvement in hand edema, discontinue Lasix and K supplement     Vitamin Deficiency - s/p gastric bypass. On B12 1000 mcg and Vitamin D 10548 weekly on transfer. Switch to cholecalciferol for better absorption.      Neurogenic bladder - Patient previously on Lasix for swelling and had incontinence. Will monitor and perform timed voiding if appropriate.       Obesity - Patient with BMI of 35 on admission. Will monitor weights.      Anemia - up to 8.2 prior to admission. 7.9 on admission. Continue to monitor - recheck on 2/20/19     GI Ppx - Patient with constipation, added MoM and Miralax PRN    DVT ppx - Patient on SCDs at [facility] concern for bleeding with Hgb down to 7.6. Patient brought in her own SCDs and prefers to use them.     Dispo - Patient to discharge home on 3/1/19. Patient working on getting short term disability paperwork completed. Discussed will need to request medical records.     Total time:  30 minutes.  I spent greater than 50% of the time for patient care, counseling, and coordination on this date, including unit/floor time, and face-to-face time with the patient as per interval events and assessment and plan above. Topics discussed included discharge planning, constipation with PRN medications and discontinue Lasix/K.    Erendira Culver  AGUILA Harper.
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
## 2019-02-21 ENCOUNTER — HOME HEALTH ADMISSION (OUTPATIENT)
Dept: HOME HEALTH SERVICES | Facility: HOME HEALTHCARE | Age: 54
End: 2019-02-21
Payer: COMMERCIAL

## 2019-02-21 PROCEDURE — 97530 THERAPEUTIC ACTIVITIES: CPT

## 2019-02-21 PROCEDURE — A9270 NON-COVERED ITEM OR SERVICE: HCPCS | Performed by: PHYSICAL MEDICINE & REHABILITATION

## 2019-02-21 PROCEDURE — 700102 HCHG RX REV CODE 250 W/ 637 OVERRIDE(OP): Performed by: HOSPITALIST

## 2019-02-21 PROCEDURE — 99232 SBSQ HOSP IP/OBS MODERATE 35: CPT | Performed by: PHYSICAL MEDICINE & REHABILITATION

## 2019-02-21 PROCEDURE — A9270 NON-COVERED ITEM OR SERVICE: HCPCS | Performed by: HOSPITALIST

## 2019-02-21 PROCEDURE — 770010 HCHG ROOM/CARE - REHAB SEMI PRIVAT*

## 2019-02-21 PROCEDURE — 97110 THERAPEUTIC EXERCISES: CPT

## 2019-02-21 PROCEDURE — 700102 HCHG RX REV CODE 250 W/ 637 OVERRIDE(OP): Performed by: PHYSICAL MEDICINE & REHABILITATION

## 2019-02-21 PROCEDURE — 700112 HCHG RX REV CODE 229: Performed by: PHYSICAL MEDICINE & REHABILITATION

## 2019-02-21 PROCEDURE — 97535 SELF CARE MNGMENT TRAINING: CPT

## 2019-02-21 RX ADMIN — METHOCARBAMOL TABLETS 1000 MG: 500 TABLET, COATED ORAL at 16:10

## 2019-02-21 RX ADMIN — SENNOSIDES AND DOCUSATE SODIUM 1 TABLET: 8.6; 5 TABLET ORAL at 21:30

## 2019-02-21 RX ADMIN — VITAMIN D, TAB 1000IU (100/BT) 2000 UNITS: 25 TAB at 09:02

## 2019-02-21 RX ADMIN — OXYCODONE HYDROCHLORIDE 10 MG: 10 TABLET ORAL at 06:06

## 2019-02-21 RX ADMIN — MORPHINE SULFATE 30 MG: 30 TABLET, EXTENDED RELEASE ORAL at 09:02

## 2019-02-21 RX ADMIN — ACETAMINOPHEN 1000 MG: 500 TABLET, FILM COATED ORAL at 09:02

## 2019-02-21 RX ADMIN — METHOCARBAMOL TABLETS 1000 MG: 500 TABLET, COATED ORAL at 09:02

## 2019-02-21 RX ADMIN — ACETAMINOPHEN 1000 MG: 500 TABLET, FILM COATED ORAL at 14:15

## 2019-02-21 RX ADMIN — CYANOCOBALAMIN TAB 1000 MCG 1000 MCG: 1000 TAB at 09:02

## 2019-02-21 RX ADMIN — SENNOSIDES AND DOCUSATE SODIUM 1 TABLET: 8.6; 5 TABLET ORAL at 09:05

## 2019-02-21 RX ADMIN — GABAPENTIN 300 MG: 300 CAPSULE ORAL at 21:25

## 2019-02-21 RX ADMIN — OXYCODONE HYDROCHLORIDE 10 MG: 10 TABLET ORAL at 21:40

## 2019-02-21 RX ADMIN — OXYCODONE HYDROCHLORIDE 10 MG: 10 TABLET ORAL at 12:31

## 2019-02-21 RX ADMIN — METHOCARBAMOL TABLETS 1000 MG: 500 TABLET, COATED ORAL at 21:10

## 2019-02-21 RX ADMIN — ASPIRIN 81 MG: 81 TABLET, COATED ORAL at 09:02

## 2019-02-21 RX ADMIN — ACETAMINOPHEN 1000 MG: 500 TABLET, FILM COATED ORAL at 21:25

## 2019-02-21 RX ADMIN — METHOCARBAMOL TABLETS 1000 MG: 500 TABLET, COATED ORAL at 12:29

## 2019-02-21 RX ADMIN — MORPHINE SULFATE 30 MG: 30 TABLET, EXTENDED RELEASE ORAL at 21:10

## 2019-02-21 RX ADMIN — FERROUS SULFATE TAB 325 MG (65 MG ELEMENTAL FE) 325 MG: 325 (65 FE) TAB at 09:02

## 2019-02-21 RX ADMIN — ASPIRIN 81 MG: 81 TABLET, COATED ORAL at 21:25

## 2019-02-21 RX ADMIN — GABAPENTIN 300 MG: 300 CAPSULE ORAL at 09:02

## 2019-02-21 RX ADMIN — DOCUSATE SODIUM 100 MG: 100 CAPSULE, LIQUID FILLED ORAL at 09:02

## 2019-02-21 RX ADMIN — DOCUSATE SODIUM 100 MG: 100 CAPSULE, LIQUID FILLED ORAL at 21:25

## 2019-02-21 RX ADMIN — GABAPENTIN 300 MG: 300 CAPSULE ORAL at 14:15

## 2019-02-21 NOTE — DISCHARGE PLANNING
ATTN: Case Management  RE: Referral for Home Health    As of 2/21/2019, we have accepted the Home Health referral for the patient listed above.    A [facility] clinician will be out to see the patient within 48 hours. If you have any questions or concerns regarding the patient’s transition to Home Health, please do not hesitate to contact us at [phone].      We look forward to collaborating with you,  [facility] Home Health Team

## 2019-02-21 NOTE — PROGRESS NOTES
Received shift report and assumed care of patient. Patient awake and resting in wheelchair with visitors at bedside. All needs met at this time. Call light and belongings within reach.

## 2019-02-21 NOTE — CARE PLAN
Problem: Safety  Goal: Will remain free from injury  Patient educated on the importance of using call light for assistance, patient verbalized understanding. Patient uses call light appropriately, does not attempt to self transfer. Call light and belongings within reach, bed in lowest and locked position, bed rails up x3 for safety, and non skid socks on. Hourly rounding in place.    Problem: Pain Management  Goal: Pain level will decrease to patient's comfort goal  Patient complains of pain to neck, back and hips. Patient given scheduled and prn medication this shift, see MAR. Patient now in bed and positioned for comfort, will continue to assess and monitor.

## 2019-02-22 PROCEDURE — A9270 NON-COVERED ITEM OR SERVICE: HCPCS | Performed by: PHYSICAL MEDICINE & REHABILITATION

## 2019-02-22 PROCEDURE — 700102 HCHG RX REV CODE 250 W/ 637 OVERRIDE(OP): Performed by: PHYSICAL MEDICINE & REHABILITATION

## 2019-02-22 PROCEDURE — 97116 GAIT TRAINING THERAPY: CPT

## 2019-02-22 PROCEDURE — 97110 THERAPEUTIC EXERCISES: CPT

## 2019-02-22 PROCEDURE — 97530 THERAPEUTIC ACTIVITIES: CPT

## 2019-02-22 PROCEDURE — 700112 HCHG RX REV CODE 229: Performed by: PHYSICAL MEDICINE & REHABILITATION

## 2019-02-22 PROCEDURE — 99232 SBSQ HOSP IP/OBS MODERATE 35: CPT | Performed by: PHYSICAL MEDICINE & REHABILITATION

## 2019-02-22 PROCEDURE — 700102 HCHG RX REV CODE 250 W/ 637 OVERRIDE(OP): Performed by: HOSPITALIST

## 2019-02-22 PROCEDURE — 97535 SELF CARE MNGMENT TRAINING: CPT

## 2019-02-22 PROCEDURE — 770010 HCHG ROOM/CARE - REHAB SEMI PRIVAT*

## 2019-02-22 PROCEDURE — A9270 NON-COVERED ITEM OR SERVICE: HCPCS | Performed by: HOSPITALIST

## 2019-02-22 RX ORDER — ASCORBIC ACID 500 MG
500 TABLET ORAL DAILY
Status: DISCONTINUED | OUTPATIENT
Start: 2019-02-22 | End: 2019-03-02 | Stop reason: HOSPADM

## 2019-02-22 RX ORDER — FERROUS SULFATE 325(65) MG
325 TABLET ORAL 2 TIMES DAILY WITH MEALS
Status: DISCONTINUED | OUTPATIENT
Start: 2019-02-22 | End: 2019-03-02 | Stop reason: HOSPADM

## 2019-02-22 RX ORDER — CALCIUM CARBONATE 500 MG/1
500 TABLET, CHEWABLE ORAL 2 TIMES DAILY PRN
Status: DISCONTINUED | OUTPATIENT
Start: 2019-02-22 | End: 2019-03-02 | Stop reason: HOSPADM

## 2019-02-22 RX ADMIN — OXYCODONE HYDROCHLORIDE AND ACETAMINOPHEN 500 MG: 500 TABLET ORAL at 14:37

## 2019-02-22 RX ADMIN — OXYCODONE HYDROCHLORIDE 10 MG: 10 TABLET ORAL at 09:08

## 2019-02-22 RX ADMIN — DOCUSATE SODIUM 100 MG: 100 CAPSULE, LIQUID FILLED ORAL at 22:06

## 2019-02-22 RX ADMIN — METHOCARBAMOL TABLETS 1000 MG: 500 TABLET, COATED ORAL at 08:25

## 2019-02-22 RX ADMIN — FERROUS SULFATE TAB 325 MG (65 MG ELEMENTAL FE) 325 MG: 325 (65 FE) TAB at 08:25

## 2019-02-22 RX ADMIN — CYANOCOBALAMIN TAB 1000 MCG 1000 MCG: 1000 TAB at 08:26

## 2019-02-22 RX ADMIN — ACETAMINOPHEN 1000 MG: 500 TABLET, FILM COATED ORAL at 08:25

## 2019-02-22 RX ADMIN — OXYCODONE HYDROCHLORIDE 10 MG: 10 TABLET ORAL at 17:38

## 2019-02-22 RX ADMIN — ASPIRIN 81 MG: 81 TABLET, COATED ORAL at 08:25

## 2019-02-22 RX ADMIN — GABAPENTIN 300 MG: 300 CAPSULE ORAL at 22:06

## 2019-02-22 RX ADMIN — MORPHINE SULFATE 30 MG: 30 TABLET, EXTENDED RELEASE ORAL at 08:26

## 2019-02-22 RX ADMIN — ASPIRIN 81 MG: 81 TABLET, COATED ORAL at 22:07

## 2019-02-22 RX ADMIN — METHOCARBAMOL TABLETS 1000 MG: 500 TABLET, COATED ORAL at 12:06

## 2019-02-22 RX ADMIN — ACETAMINOPHEN 1000 MG: 500 TABLET, FILM COATED ORAL at 14:36

## 2019-02-22 RX ADMIN — ZOLPIDEM TARTRATE 5 MG: 5 TABLET ORAL at 22:07

## 2019-02-22 RX ADMIN — ACETAMINOPHEN 1000 MG: 500 TABLET, FILM COATED ORAL at 22:06

## 2019-02-22 RX ADMIN — MORPHINE SULFATE 30 MG: 30 TABLET, EXTENDED RELEASE ORAL at 22:06

## 2019-02-22 RX ADMIN — GABAPENTIN 300 MG: 300 CAPSULE ORAL at 14:36

## 2019-02-22 RX ADMIN — METHOCARBAMOL TABLETS 1000 MG: 500 TABLET, COATED ORAL at 22:05

## 2019-02-22 RX ADMIN — VITAMIN D, TAB 1000IU (100/BT) 2000 UNITS: 25 TAB at 08:25

## 2019-02-22 RX ADMIN — METHOCARBAMOL TABLETS 1000 MG: 500 TABLET, COATED ORAL at 17:38

## 2019-02-22 RX ADMIN — FERROUS SULFATE TAB 325 MG (65 MG ELEMENTAL FE) 325 MG: 325 (65 FE) TAB at 17:38

## 2019-02-22 RX ADMIN — DOCUSATE SODIUM 100 MG: 100 CAPSULE, LIQUID FILLED ORAL at 08:26

## 2019-02-22 RX ADMIN — GABAPENTIN 300 MG: 300 CAPSULE ORAL at 08:25

## 2019-02-22 RX ADMIN — OXYCODONE HYDROCHLORIDE 10 MG: 10 TABLET ORAL at 12:06

## 2019-02-22 ASSESSMENT — GAIT ASSESSMENTS
GAIT LEVEL OF ASSIST: CONTACT GUARD ASSIST
ASSISTIVE DEVICE: PARALLEL BARS
DISTANCE (FEET): 10
DEVIATION: ANTALGIC;BRADYKINETIC;DECREASED HEEL STRIKE;DECREASED TOE OFF;OTHER (COMMENT)

## 2019-02-22 NOTE — CARE PLAN
"Problem: Safety  Goal: Will remain free from injury  Outcome: PROGRESSING AS EXPECTED  Patient use call light for assistance. Remains free from injury.     Problem: Skin Integrity  Goal: Risk for impaired skin integrity will decrease  Outcome: PROGRESSING SLOWER THAN EXPECTED  Patient has Prevnar wound vac at left hip. Battery was out already per report. Patient refused to have staff to remove the dressing and stated \"my Surgeon Dr Levine said not to remove the wound vac until seen by him.\" Her followup appointment with Dr Levine is on the 26th. TIMO Samaniego notified and will verify with her surgeon.      "
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
## 2019-02-22 NOTE — CARE PLAN
Problem: Bowel/Gastric:  Goal: Normal bowel function is maintained or improved  Patient reports she has not had a bowel movement since 2/15/19 despite documentation that says otherwise. Patient requesting PRN suppository. Patient up to bathroom and had large BM without suppository and then refused it. Patient requested PRN senna instead, medication given. Patient encouraged to stay hydrated and educated on opioid induced constipation, will continue to reinforce education.     Problem: Urinary Elimination:  Goal: Ability to reestablish a normal urinary elimination pattern will improve  Patient voiding clear, yellow urine. Denies burning or stinging with urination. PO fluids available at bedside.

## 2019-02-22 NOTE — PROGRESS NOTES
Received shift report and assumed care of patient. Patient awake and resting in bed. All needs met at this time. Call light and belongings within reach.    Pt is in no current distress. Awaiting CT results. Family at bedside. Comfort and safety provided.
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
## 2019-02-22 NOTE — DISCHARGE PLANNING
Met w/ patient who has additional questions about billing statement/ charges from [facility].   I informed her if she needed additional information, she would need to call billing dept to clarify.   She has number.     Release of information signed and faxed to Medical Records yesterday afternoon.   Pt eager to get records as she is in process of filing claim with [employer].

## 2019-02-23 PROCEDURE — 700102 HCHG RX REV CODE 250 W/ 637 OVERRIDE(OP): Performed by: PHYSICAL MEDICINE & REHABILITATION

## 2019-02-23 PROCEDURE — 770010 HCHG ROOM/CARE - REHAB SEMI PRIVAT*

## 2019-02-23 PROCEDURE — A9270 NON-COVERED ITEM OR SERVICE: HCPCS | Performed by: PHYSICAL MEDICINE & REHABILITATION

## 2019-02-23 PROCEDURE — 700112 HCHG RX REV CODE 229: Performed by: PHYSICAL MEDICINE & REHABILITATION

## 2019-02-23 RX ADMIN — ASPIRIN 81 MG: 81 TABLET, COATED ORAL at 08:40

## 2019-02-23 RX ADMIN — GABAPENTIN 300 MG: 300 CAPSULE ORAL at 14:35

## 2019-02-23 RX ADMIN — DOCUSATE SODIUM 100 MG: 100 CAPSULE, LIQUID FILLED ORAL at 22:22

## 2019-02-23 RX ADMIN — METHOCARBAMOL TABLETS 1000 MG: 500 TABLET, COATED ORAL at 22:22

## 2019-02-23 RX ADMIN — OXYCODONE HYDROCHLORIDE 10 MG: 10 TABLET ORAL at 14:35

## 2019-02-23 RX ADMIN — GABAPENTIN 300 MG: 300 CAPSULE ORAL at 22:22

## 2019-02-23 RX ADMIN — DOCUSATE SODIUM 100 MG: 100 CAPSULE, LIQUID FILLED ORAL at 08:40

## 2019-02-23 RX ADMIN — ACETAMINOPHEN 1000 MG: 500 TABLET, FILM COATED ORAL at 21:31

## 2019-02-23 RX ADMIN — CALCIUM CARBONATE (ANTACID) CHEW TAB 500 MG 500 MG: 500 CHEW TAB at 12:56

## 2019-02-23 RX ADMIN — OXYCODONE HYDROCHLORIDE 10 MG: 10 TABLET ORAL at 06:22

## 2019-02-23 RX ADMIN — ACETAMINOPHEN 1000 MG: 500 TABLET, FILM COATED ORAL at 08:40

## 2019-02-23 RX ADMIN — FERROUS SULFATE TAB 325 MG (65 MG ELEMENTAL FE) 325 MG: 325 (65 FE) TAB at 17:56

## 2019-02-23 RX ADMIN — MORPHINE SULFATE 30 MG: 30 TABLET, EXTENDED RELEASE ORAL at 08:40

## 2019-02-23 RX ADMIN — OXYCODONE HYDROCHLORIDE AND ACETAMINOPHEN 500 MG: 500 TABLET ORAL at 08:40

## 2019-02-23 RX ADMIN — ZOLPIDEM TARTRATE 5 MG: 5 TABLET ORAL at 22:26

## 2019-02-23 RX ADMIN — ACETAMINOPHEN 1000 MG: 500 TABLET, FILM COATED ORAL at 14:35

## 2019-02-23 RX ADMIN — METHOCARBAMOL TABLETS 1000 MG: 500 TABLET, COATED ORAL at 08:39

## 2019-02-23 RX ADMIN — METHOCARBAMOL TABLETS 1000 MG: 500 TABLET, COATED ORAL at 17:56

## 2019-02-23 RX ADMIN — MORPHINE SULFATE 30 MG: 30 TABLET, EXTENDED RELEASE ORAL at 21:31

## 2019-02-23 RX ADMIN — VITAMIN D, TAB 1000IU (100/BT) 2000 UNITS: 25 TAB at 08:40

## 2019-02-23 RX ADMIN — METHOCARBAMOL TABLETS 1000 MG: 500 TABLET, COATED ORAL at 12:56

## 2019-02-23 RX ADMIN — FERROUS SULFATE TAB 325 MG (65 MG ELEMENTAL FE) 325 MG: 325 (65 FE) TAB at 08:40

## 2019-02-23 RX ADMIN — ASPIRIN 81 MG: 81 TABLET, COATED ORAL at 22:22

## 2019-02-23 RX ADMIN — CALCIUM CARBONATE (ANTACID) CHEW TAB 500 MG 500 MG: 500 CHEW TAB at 08:49

## 2019-02-23 RX ADMIN — GABAPENTIN 300 MG: 300 CAPSULE ORAL at 08:40

## 2019-02-23 RX ADMIN — CYANOCOBALAMIN TAB 1000 MCG 1000 MCG: 1000 TAB at 08:40

## 2019-02-23 NOTE — CARE PLAN
Problem: Safety  Goal: Will remain free from injury  Uses call light for assist to toilet/transfer. Bed in low position, call light within reach.    Problem: Pain Management  Goal: Pain level will decrease to patient's comfort goal  Scheduled meds administered at HS for pain. Good effect. Upon reassess, resting in bed, eyes closed, resp even, unlabored.

## 2019-02-23 NOTE — PROGRESS NOTES
Received patient during shift change, report rec'd from day shift RN. Resting in bed, VS stable on room air. Continent of B&B, max assist for transfers. A&O x 4, able to make needs known. Bed in low position, call light within reach.

## 2019-02-23 NOTE — PROGRESS NOTES
Prevena pump battery dead. I called Dr Levine's office as pt stated that dsg is to be removed by Dr Levine only even if pump no longer working. Has appt 2/26 with Abner

## 2019-02-24 PROCEDURE — 770010 HCHG ROOM/CARE - REHAB SEMI PRIVAT*

## 2019-02-24 PROCEDURE — 700102 HCHG RX REV CODE 250 W/ 637 OVERRIDE(OP): Performed by: PHYSICAL MEDICINE & REHABILITATION

## 2019-02-24 PROCEDURE — A9270 NON-COVERED ITEM OR SERVICE: HCPCS | Performed by: PHYSICAL MEDICINE & REHABILITATION

## 2019-02-24 PROCEDURE — 700112 HCHG RX REV CODE 229: Performed by: PHYSICAL MEDICINE & REHABILITATION

## 2019-02-24 RX ADMIN — METHOCARBAMOL TABLETS 1000 MG: 500 TABLET, COATED ORAL at 21:49

## 2019-02-24 RX ADMIN — METHOCARBAMOL TABLETS 1000 MG: 500 TABLET, COATED ORAL at 09:27

## 2019-02-24 RX ADMIN — GABAPENTIN 300 MG: 300 CAPSULE ORAL at 15:05

## 2019-02-24 RX ADMIN — CALCIUM CARBONATE (ANTACID) CHEW TAB 500 MG 500 MG: 500 CHEW TAB at 18:35

## 2019-02-24 RX ADMIN — GABAPENTIN 300 MG: 300 CAPSULE ORAL at 09:27

## 2019-02-24 RX ADMIN — OXYCODONE HYDROCHLORIDE 10 MG: 10 TABLET ORAL at 20:08

## 2019-02-24 RX ADMIN — METHOCARBAMOL TABLETS 1000 MG: 500 TABLET, COATED ORAL at 14:00

## 2019-02-24 RX ADMIN — VITAMIN D, TAB 1000IU (100/BT) 2000 UNITS: 25 TAB at 09:28

## 2019-02-24 RX ADMIN — GABAPENTIN 300 MG: 300 CAPSULE ORAL at 21:48

## 2019-02-24 RX ADMIN — ACETAMINOPHEN 1000 MG: 500 TABLET, FILM COATED ORAL at 09:26

## 2019-02-24 RX ADMIN — ACETAMINOPHEN 1000 MG: 500 TABLET, FILM COATED ORAL at 21:47

## 2019-02-24 RX ADMIN — FERROUS SULFATE TAB 325 MG (65 MG ELEMENTAL FE) 325 MG: 325 (65 FE) TAB at 09:26

## 2019-02-24 RX ADMIN — CYANOCOBALAMIN TAB 1000 MCG 1000 MCG: 1000 TAB at 09:28

## 2019-02-24 RX ADMIN — ACETAMINOPHEN 1000 MG: 500 TABLET, FILM COATED ORAL at 15:05

## 2019-02-24 RX ADMIN — POLYETHYLENE GLYCOL 3350 1 PACKET: 17 POWDER, FOR SOLUTION ORAL at 05:45

## 2019-02-24 RX ADMIN — DOCUSATE SODIUM 100 MG: 100 CAPSULE, LIQUID FILLED ORAL at 21:48

## 2019-02-24 RX ADMIN — METHOCARBAMOL TABLETS 1000 MG: 500 TABLET, COATED ORAL at 18:26

## 2019-02-24 RX ADMIN — OXYCODONE HYDROCHLORIDE AND ACETAMINOPHEN 500 MG: 500 TABLET ORAL at 09:28

## 2019-02-24 RX ADMIN — ASPIRIN 81 MG: 81 TABLET, COATED ORAL at 09:28

## 2019-02-24 RX ADMIN — ASPIRIN 81 MG: 81 TABLET, COATED ORAL at 21:48

## 2019-02-24 RX ADMIN — FERROUS SULFATE TAB 325 MG (65 MG ELEMENTAL FE) 325 MG: 325 (65 FE) TAB at 18:25

## 2019-02-24 RX ADMIN — MORPHINE SULFATE 30 MG: 30 TABLET, EXTENDED RELEASE ORAL at 09:26

## 2019-02-24 RX ADMIN — ZOLPIDEM TARTRATE 5 MG: 5 TABLET ORAL at 21:50

## 2019-02-24 RX ADMIN — OXYCODONE HYDROCHLORIDE 10 MG: 10 TABLET ORAL at 09:40

## 2019-02-24 RX ADMIN — MORPHINE SULFATE 30 MG: 30 TABLET, EXTENDED RELEASE ORAL at 21:48

## 2019-02-24 NOTE — PROGRESS NOTES
Received patient during shift change, report rec'd from day shift RN. Sitting up in w/c, VS stable on room air. Continent of B&B, max assist for transfers. A&O x 4, able to make needs known. Call light within reach.

## 2019-02-24 NOTE — CARE PLAN
Problem: Safety  Goal: Will remain free from injury  Outcome: PROGRESSING AS EXPECTED  Patient remains free from injury. Use call light for assistance.     Problem: Pain Management  Goal: Pain level will decrease to patient's comfort goal  Outcome: PROGRESSING AS EXPECTED  Patient c/o back pain. Medicated her with oxycodone for pain. Repositioned for comfort. Will continue to monitor.

## 2019-02-24 NOTE — CARE PLAN
Problem: Safety  Goal: Will remain free from injury  Transferred from w/c to toilet, back to bed w/assist x1. Tolerated well. Using call light for assist. Bed in low position, call light within reach.    Problem: Pain Management  Goal: Pain level will decrease to patient's comfort goal  Scheduled pain med administered at HS. Good effect. Upon reassess, resting in bed, eyes closed, resp even, unlabored.

## 2019-02-24 NOTE — PROGRESS NOTES
Patient states she was up to the bed side commode, being helped back to bed by the CNA and using her walker, she lost her balance and was eased over onto the bed by the CNA.  Patient examined by this nurse, charge nurse Rizwana MARTINO RN and Dr. Lee notified.  No injury noted.

## 2019-02-24 NOTE — PROGRESS NOTES
DATE OF SERVICE:  02/20/2019    The patient reports she is settling into her rehab.  She says she is doing   very well.  She says she is gaining strength slowly.  She said she believes   she is much safer with her transfers.  The patient talked about her short and   long-term goals and some of her fears/concerns.       ____________________________________     MARIBEL FINK, PHD    JENIFFER / RICHARD    DD:  02/24/2019 15:09:42  DT:  02/24/2019 15:50:42    D#:  [id number]  Job#:  [id number]

## 2019-02-25 LAB
BASOPHILS # BLD AUTO: 0.7 % (ref 0–1.8)
BASOPHILS # BLD: 0.05 K/UL (ref 0–0.12)
EOSINOPHIL # BLD AUTO: 0.36 K/UL (ref 0–0.51)
EOSINOPHIL NFR BLD: 5 % (ref 0–6.9)
ERYTHROCYTE [DISTWIDTH] IN BLOOD BY AUTOMATED COUNT: 54.9 FL (ref 35.9–50)
HCT VFR BLD AUTO: 27.8 % (ref 37–47)
HGB BLD-MCNC: 9 G/DL (ref 12–16)
HGB RETIC QN AUTO: 29.6 PG/CELL (ref 29–35)
IMM GRANULOCYTES # BLD AUTO: 0.03 K/UL (ref 0–0.11)
IMM GRANULOCYTES NFR BLD AUTO: 0.4 % (ref 0–0.9)
IMM RETICS NFR: 33.4 % (ref 9.3–17.4)
IRON SATN MFR SERPL: 11 % (ref 15–55)
IRON SERPL-MCNC: 31 UG/DL (ref 40–170)
LYMPHOCYTES # BLD AUTO: 2.56 K/UL (ref 1–4.8)
LYMPHOCYTES NFR BLD: 35.6 % (ref 22–41)
MCH RBC QN AUTO: 26.5 PG (ref 27–33)
MCHC RBC AUTO-ENTMCNC: 32.4 G/DL (ref 33.6–35)
MCV RBC AUTO: 82 FL (ref 81.4–97.8)
MONOCYTES # BLD AUTO: 0.59 K/UL (ref 0–0.85)
MONOCYTES NFR BLD AUTO: 8.2 % (ref 0–13.4)
NEUTROPHILS # BLD AUTO: 3.6 K/UL (ref 2–7.15)
NEUTROPHILS NFR BLD: 50.1 % (ref 44–72)
NRBC # BLD AUTO: 0 K/UL
NRBC BLD-RTO: 0 /100 WBC
PLATELET # BLD AUTO: 492 K/UL (ref 164–446)
PMV BLD AUTO: 9.2 FL (ref 9–12.9)
RBC # BLD AUTO: 3.39 M/UL (ref 4.2–5.4)
RETICS # AUTO: 0.09 M/UL (ref 0.04–0.06)
RETICS/RBC NFR: 2.7 % (ref 0.8–2.1)
TIBC SERPL-MCNC: 283 UG/DL (ref 250–450)
WBC # BLD AUTO: 7.2 K/UL (ref 4.8–10.8)

## 2019-02-25 PROCEDURE — 99232 SBSQ HOSP IP/OBS MODERATE 35: CPT | Performed by: PHYSICAL MEDICINE & REHABILITATION

## 2019-02-25 PROCEDURE — A9270 NON-COVERED ITEM OR SERVICE: HCPCS | Performed by: PHYSICAL MEDICINE & REHABILITATION

## 2019-02-25 PROCEDURE — 97530 THERAPEUTIC ACTIVITIES: CPT

## 2019-02-25 PROCEDURE — 700102 HCHG RX REV CODE 250 W/ 637 OVERRIDE(OP): Performed by: PHYSICAL MEDICINE & REHABILITATION

## 2019-02-25 PROCEDURE — 83550 IRON BINDING TEST: CPT

## 2019-02-25 PROCEDURE — 83540 ASSAY OF IRON: CPT

## 2019-02-25 PROCEDURE — 85025 COMPLETE CBC W/AUTO DIFF WBC: CPT

## 2019-02-25 PROCEDURE — 97535 SELF CARE MNGMENT TRAINING: CPT

## 2019-02-25 PROCEDURE — 85046 RETICYTE/HGB CONCENTRATE: CPT

## 2019-02-25 PROCEDURE — 97116 GAIT TRAINING THERAPY: CPT

## 2019-02-25 PROCEDURE — 770010 HCHG ROOM/CARE - REHAB SEMI PRIVAT*

## 2019-02-25 PROCEDURE — 97112 NEUROMUSCULAR REEDUCATION: CPT

## 2019-02-25 PROCEDURE — 36415 COLL VENOUS BLD VENIPUNCTURE: CPT

## 2019-02-25 PROCEDURE — 700112 HCHG RX REV CODE 229: Performed by: PHYSICAL MEDICINE & REHABILITATION

## 2019-02-25 PROCEDURE — 97110 THERAPEUTIC EXERCISES: CPT

## 2019-02-25 RX ADMIN — GABAPENTIN 300 MG: 300 CAPSULE ORAL at 21:16

## 2019-02-25 RX ADMIN — METHOCARBAMOL TABLETS 1000 MG: 500 TABLET, COATED ORAL at 14:19

## 2019-02-25 RX ADMIN — METHOCARBAMOL TABLETS 1000 MG: 500 TABLET, COATED ORAL at 17:35

## 2019-02-25 RX ADMIN — OXYCODONE HYDROCHLORIDE AND ACETAMINOPHEN 500 MG: 500 TABLET ORAL at 08:29

## 2019-02-25 RX ADMIN — ACETAMINOPHEN 1000 MG: 500 TABLET, FILM COATED ORAL at 08:29

## 2019-02-25 RX ADMIN — DOCUSATE SODIUM 100 MG: 100 CAPSULE, LIQUID FILLED ORAL at 08:30

## 2019-02-25 RX ADMIN — FERROUS SULFATE TAB 325 MG (65 MG ELEMENTAL FE) 325 MG: 325 (65 FE) TAB at 08:30

## 2019-02-25 RX ADMIN — GABAPENTIN 300 MG: 300 CAPSULE ORAL at 14:19

## 2019-02-25 RX ADMIN — OXYCODONE HYDROCHLORIDE 10 MG: 10 TABLET ORAL at 13:33

## 2019-02-25 RX ADMIN — MORPHINE SULFATE 30 MG: 30 TABLET, EXTENDED RELEASE ORAL at 21:16

## 2019-02-25 RX ADMIN — ASPIRIN 81 MG: 81 TABLET, COATED ORAL at 08:30

## 2019-02-25 RX ADMIN — DOCUSATE SODIUM 100 MG: 100 CAPSULE, LIQUID FILLED ORAL at 21:16

## 2019-02-25 RX ADMIN — OXYCODONE HYDROCHLORIDE 10 MG: 10 TABLET ORAL at 06:29

## 2019-02-25 RX ADMIN — ACETAMINOPHEN 1000 MG: 500 TABLET, FILM COATED ORAL at 14:19

## 2019-02-25 RX ADMIN — VITAMIN D, TAB 1000IU (100/BT) 2000 UNITS: 25 TAB at 08:29

## 2019-02-25 RX ADMIN — OXYCODONE HYDROCHLORIDE 10 MG: 10 TABLET ORAL at 21:18

## 2019-02-25 RX ADMIN — ZOLPIDEM TARTRATE 5 MG: 5 TABLET ORAL at 22:52

## 2019-02-25 RX ADMIN — CALCIUM CARBONATE (ANTACID) CHEW TAB 500 MG 500 MG: 500 CHEW TAB at 18:41

## 2019-02-25 RX ADMIN — METHOCARBAMOL TABLETS 1000 MG: 500 TABLET, COATED ORAL at 21:16

## 2019-02-25 RX ADMIN — GABAPENTIN 300 MG: 300 CAPSULE ORAL at 08:29

## 2019-02-25 RX ADMIN — METHOCARBAMOL TABLETS 1000 MG: 500 TABLET, COATED ORAL at 08:29

## 2019-02-25 RX ADMIN — FERROUS SULFATE TAB 325 MG (65 MG ELEMENTAL FE) 325 MG: 325 (65 FE) TAB at 17:35

## 2019-02-25 RX ADMIN — ACETAMINOPHEN 1000 MG: 500 TABLET, FILM COATED ORAL at 21:16

## 2019-02-25 RX ADMIN — MORPHINE SULFATE 30 MG: 30 TABLET, EXTENDED RELEASE ORAL at 08:30

## 2019-02-25 RX ADMIN — CYANOCOBALAMIN TAB 1000 MCG 1000 MCG: 1000 TAB at 08:30

## 2019-02-25 RX ADMIN — ASPIRIN 81 MG: 81 TABLET, COATED ORAL at 21:16

## 2019-02-25 ASSESSMENT — GAIT ASSESSMENTS
GAIT LEVEL OF ASSIST: CONTACT GUARD ASSIST
DISTANCE (FEET): 100
ASSISTIVE DEVICE: FRONT WHEEL WALKER

## 2019-02-25 NOTE — REHAB-OT IDT TEAM NOTE
Occupational Therapy   Activities of Daily Living  Eating Initial:  7 - Independent  Eating Current:  5 - Standby Prompting/Supervision or Set-up   Eating Description:   (setup of food tray  opening packages and containers)  Grooming Initial:  4 - Minimal Assistance  Grooming Current:  5 - Standby Prompting/Supervision or Set-up   Grooming Description:   (oral care )  Bathing Initial:  3 - Moderate Assistance  Bathing Current:  4 - Minimal Assistance   Bathing Description:  Grab bar, Tub bench, Hand held shower, Long handled bath tool ( Therapist washed/ rinsed buttocks    provided assist for quality using long sponge for feet.  patient washed left UE using long sponge. )  Upper Body Dressing Initial:  3 - Moderate Assistance  Upper Body Dressing Current:  4 - Minimal Assistance   Upper Body Dressing Description:  Requires minimal contact (Min A to pull shirt down with VCing for orientation of shirt and sequencing of steps)  Lower Body Dressing Initial:  2 - Max Assistance  Lower Body Dressing Current:2  Max to   1 - Total Assistance   Lower Body Dressing Description:  1 - Total Assistance  Toileting Initial:  1 - Total Assistance  Toileting Current:  1 - Total Assistance   Toileting Description:  Grab bar, Increased time, Verbal cueing, Initial preparation for task  Toilet Transfer Initial:  3 - Moderate Assistance  Toilet Transfer Current:  5 - Standby Prompting/Supervision or Set-up   Toilet Transfer Description:  5 - Standby Prompting/Supervision or Set-up  Tub / Shower Transfer Initial:  4 - Minimal Assistance  Tub / Shower Transfer Current:  5 - Standby Prompting/Supervision or Set-up   Tub / Shower Transfer Description:  Grab bar ( SBA )  IADL: not yet addressed   Family Training/Education:  Initiated with BF observing LB dressing and tub  Bench transfer   DME/DC Recommendations: tub bench  Grab bars.  Hip kit    Strengths:  Able to follow instructions, Effective communication skills, Making steady progress  towards goals, Manages pain appropriately, Motivated for self care and independence, Pleasant and cooperative, Supportive family and Willingly participates in therapeutic activities  Barriers:  Decreased endurance, Generalized weakness and Poor activity tolerance  Self limiting      # of short term goals set=9  # of short term goals met= 4       Occupational Therapy Goals           Problem: Dressing     Dates: Start: 02/16/19       Goal: STG-Within one week, patient will dress UB     Dates: Start: 02/16/19   Expected End: 02/23/19       Description: 1) Individualized Goal:  At a level of CGA.   2) Interventions:  OT Self Care/ADL, OT Manual Ther Technique, OT Neuro Re-Ed/Balance, OT Therapeutic Activity, OT Evaluation and OT Therapeutic Exercise       Note:     Goal Note filed on 02/25/19 1458 by Isaiah Montalvo, C.O.T.A.    Goal: STG-Within one week, patient will dress UB  Outcome: NOT MET  Continued min assist   Limited by decreased right UE function                  Goal: STG-Within one week, patient will dress LB     Dates: Start: 02/16/19   Expected End: 02/23/19       Description: 1) Individualized Goal:  At a level of Mod A with use of AE.   2) Interventions:  OT Self Care/ADL, OT Manual Ther Technique, OT Therapeutic Activity, OT Evaluation and OT Therapeutic Exercise       Note:     Goal Note filed on 02/25/19 1458 by Isaiah Montalvo, C.O.T.A.    Goal: STG-Within one week, patient will dress LB  Outcome: NOT MET   Continued max assist    limited by pain , self limiting behavior. Easily distracted                   Goal: STG-Within one week, patient will retrieve clothing     Dates: Start: 02/16/19   Expected End: 02/23/19       Description: 1) Individualized Goal:  At a level of SBA.   2) Interventions:  OT E Stim Attended, OT Self Care/ADL, OT Manual Ther Technique, OT Neuro Re-Ed/Balance, OT Therapeutic Activity, OT Evaluation and OT Therapeutic Exercise       Note:     Goal Note filed on 02/25/19 1458 by  Isaiah Montalvo, C.O.T.A.    Goal: STG-Within one week, patient will retrieve clothing  Outcome: NOT MET  Not addressed                   Problem: IADL's     Dates: Start: 02/16/19       Goal: STG-Within one week, patient will access kitchen area     Dates: Start: 02/16/19   Expected End: 02/23/19       Description: 1) Individualized Goal: at a level of CGA.   2) Interventions:  OT Self Care/ADL, OT Community Reintegration, OT Manual Ther Technique, OT Neuro Re-Ed/Balance, OT Therapeutic Activity and OT Therapeutic Exercise       Note:     Goal Note filed on 02/25/19 1458 by Isaiah Montalvo, C.O.T.A.    Goal: STG-Within one week, patient will access kitchen area  Outcome: NOT MET   Not addressed                     Problem: OT Long Term Goals     Dates: Start: 02/16/19       Goal: LTG-By discharge, patient will complete basic self care tasks     Dates: Start: 02/16/19   Expected End: 03/02/19       Description: 1) Individualized Goal:  At a level of Min A.   2) Interventions:  OT Self Care/ADL, OT Neuro Re-Ed/Balance, OT Therapeutic Activity, OT Evaluation and OT Therapeutic Exercise             Goal: LTG-By discharge, patient will perform bathroom transfers     Dates: Start: 02/16/19   Expected End: 03/02/19       Description: 1) Individualized Goal:  At a level of Mod I.   2) Interventions:  OT E Stim Attended, OT Group Therapy, OT Self Care/ADL, OT Community Reintegration, OT Neuro Re-Ed/Balance, OT Therapeutic Activity, OT Evaluation and OT Therapeutic Exercise             Goal: LTG-By discharge, patient will complete basic home management     Dates: Start: 02/16/19   Expected End: 03/02/19       Description: 1) Individualized Goal:  At a level of Min A.   2) Interventions:  OT Self Care/ADL, OT Community Reintegration, OT Manual Ther Technique, OT Neuro Re-Ed/Balance, and OT Therapeutic Activity.                  Problem: Toileting     Dates: Start: 02/16/19       Goal: STG-Within one week, patient will complete  toileting tasks     Dates: Start: 02/16/19   Expected End: 02/23/19       Description: 1) Individualized Goal:  At a level of CGA.   2) Interventions:  OT Self Care/ADL, OT Community Reintegration, OT Manual Ther Technique, OT Neuro Re-Ed/Balance, OT Therapeutic Activity and OT Therapeutic Exercise       Note:     Goal Note filed on 02/25/19 1458 by ROMANA Cobb.O.T.ADustin    Goal: STG-Within one week, patient will complete toileting tasks  Outcome: NOT MET   Continued  Max to total assist    limited by pain , self limiting behavior.                      Section completed by:  ROMANA Cobb.O.T.ADustin/Gunnar Quigley M.S., OT/ANAT

## 2019-02-25 NOTE — CARE PLAN
Problem: Mobility Transfers  Goal: STG-Within one week, patient will transfer bed to chair  1) Individualized goal:  Patient will transfer SBA with FWW consistently for STS/SPT, bed mob SBA  2) Interventions:  PT Group Therapy, PT E Stim Attended, PT Gait Training, PT Therapeutic Exercises, PT Neuro Re-Ed/Balance, PT Aquatic Therapy, PT Therapeutic Activity and PT Manual Therapy    Outcome: NOT MET  CGA transfers with FWW, min A bed mobility with leg

## 2019-02-25 NOTE — PROGRESS NOTES
Received patient during shift change, report rec'd from day shift RN. Sitting up in w/c, VS stable on room air. Continent of B&B, max assist w/TTWB for transfers. A&O x 4, able to make needs known. Bed in low position, call light within reach.

## 2019-02-25 NOTE — CARE PLAN
Problem: Grooming  Goal: STG-Within one week, patient will complete grooming  1) Individualized Goal:  At sinkside at a level of SBA assist.   2) Interventions:  OT E Stim Attended, OT Self Care/ADL, OT Manual Ther Technique, OT Neuro Re-Ed/Balance, OT Therapeutic Activity, OT Evaluation and OT Therapeutic Exercise     Outcome: MET Date Met: 02/25/19      Problem: Bathing  Goal: STG-Within one week, patient will bathe  1) Individualized Goal: at a level of Min A on shower bench.  2) Interventions:  OT Self Care/ADL, OT Manual Ther Technique, OT Neuro Re-Ed/Balance, OT Therapeutic Activity, OT Evaluation and OT Therapeutic Exercise     Outcome: MET Date Met: 02/25/19      Problem: Dressing  Goal: STG-Within one week, patient will dress UB  1) Individualized Goal:  At a level of CGA.   2) Interventions:  OT Self Care/ADL, OT Manual Ther Technique, OT Neuro Re-Ed/Balance, OT Therapeutic Activity, OT Evaluation and OT Therapeutic Exercise     Outcome: NOT MET  Continued min assist   Limited by decreased right UE function    Goal: STG-Within one week, patient will dress LB  1) Individualized Goal:  At a level of Mod A with use of AE.   2) Interventions:  OT Self Care/ADL, OT Manual Ther Technique, OT Therapeutic Activity, OT Evaluation and OT Therapeutic Exercise     Outcome: NOT MET   Continued max assist    limited by pain , self limiting behavior. Easily distracted     Goal: STG-Within one week, patient will retrieve clothing  1) Individualized Goal:  At a level of SBA.   2) Interventions:  OT E Stim Attended, OT Self Care/ADL, OT Manual Ther Technique, OT Neuro Re-Ed/Balance, OT Therapeutic Activity, OT Evaluation and OT Therapeutic Exercise     Outcome: NOT MET  Not addressed     Problem: Toileting  Goal: STG-Within one week, patient will complete toileting tasks  1) Individualized Goal:  At a level of CGA.   2) Interventions:  OT Self Care/ADL, OT Community Reintegration, OT Manual Ther Technique, OT Neuro  Re-Ed/Balance, OT Therapeutic Activity and OT Therapeutic Exercise     Outcome: NOT MET   Continued  Max to total assist    limited by pain , self limiting behavior.    Problem: Functional Transfers  Goal: STG-Within one week, patient will transfer to toilet  1) Individualized Goal:  At a level of CGA.   2) Interventions:  OT Self Care/ADL, OT Manual Ther Technique, OT Neuro Re-Ed/Balance, OT Therapeutic Activity, OT Evaluation and OT Therapeutic Exercise     Outcome: MET Date Met: 02/25/19   SBA   Goal: STG-Within one week, patient will transfer to tub/shower  1) Individualized Goal:  At a level of SBA.   2) Interventions:  OT Self Care/ADL, OT Community Reintegration, OT Neuro Re-Ed/Balance, OT Therapeutic Activity and OT Therapeutic Exercise     Outcome: MET Date Met: 02/25/19   SBA     Problem: IADL's  Goal: STG-Within one week, patient will access kitchen area  1) Individualized Goal: at a level of CGA.   2) Interventions:  OT Self Care/ADL, OT Community Reintegration, OT Manual Ther Technique, OT Neuro Re-Ed/Balance, OT Therapeutic Activity and OT Therapeutic Exercise     Outcome: NOT MET   Not addressed

## 2019-02-25 NOTE — CARE PLAN
Problem: Mobility  Goal: STG-Within one week, patient will  1) Individualized goal: Gait FWW cga, 55 ft 1 week  2) Interventions:  PT Gait Training, PT Therapeutic Exercises and PT Therapeutic Activity     Outcome: MET Date Met: 02/25/19

## 2019-02-25 NOTE — CARE PLAN
Problem: Communication  Goal: The ability to communicate needs accurately and effectively will improve  Patient alert and oriented x 4,    Problem: Pain Management  Goal: Pain level will decrease to patient's comfort goal   02/24/19 0940   OTHER   Pain Rating Scale (NPRS) 6   Comfort Goal Comfort at Rest;Stay Alert;Perform Activity;Comfort with Movement   0940 Patient complained of lower back pain and right arm pain.  Roxicodone 10 mg given per prn order.   1100 Patient is resting in bed.  No complaint of pain at this time.

## 2019-02-25 NOTE — CONSULTS
DATE OF SERVICE:  02/19/2019    BEHAVIORAL MEDICINE EVALUATION    BRIEF HISTORY OF PRESENTING COMPLAINTS:  The patient is a 53-year-old single    female who is referred for a behavioral medicine evaluation   by Dr. Harper.  The patient was transferred to rehab from acute where she   was treated for a unilateral hip fracture.  The patient apparently fell in the   shower.  She was admitted for questionable acetabular fracture.  She   underwent a left hip arthroplasty.  She tolerated the procedure well.  The   patient was stabilized acutely.  She was then sent to rehab to address her   general debility.    PAST MEDICAL HISTORY:  Significant for anemia, arthritis, chronic back pain,   dental disorder, diffuse body pain, arthritic pain, and urinary incontinence.    PSYCHOLOGICAL STATUS:  MENTAL STATUS EXAMINATION:  The patient is a well-nourished overweight female   of medium stature who appeared her stated age of 53.  At presentation, the   patient was alert.  She was sitting in a wheelchair next to her bed when   approached.  The patient oriented well to my presence.  The patient was kempt   in appearance.  She was dressed in hospital garb.  Her manner of presentation   was cooperative and friendly.    The patient was well oriented to time, place, and person.  Her language was   logical and goal oriented, and her speech was normal for rate and rhythm.  The   patient's concentration and memory functioning appeared intact.    The patient's affect was slightly constricted, stable, and mildly intense.    She related well.  Her mood appeared somewhat anxious, but appropriate to the   context.    There was no evidence of delusional or perceptual disturbance.  Also, the   patient showed no unusual pain or motor behavior during the interview.    SPECIFIC BEHAVIORAL COMPLAINTS:  The patient denied any clinically significant   symptoms of a negative mood.  She reported no strong feelings of depression,    anxiety, or anger.    The patient reported her pain remains at severe levels.  She rated her average   pain intensity as an 8-9/10.  The patient reported that she hurts all over.    The patient also reported some restless sleep.  Her energy or appetite are   diminished.    The patient reported no interpersonal discord or discomfort or any significant   family disharmony.  She also reported no problems managing her day-to-day   stressors prior to experiencing significant back problems.    The patient also reported no ETOH or other drug abuse or any use of tobacco.    PSYCHIATRIC HISTORY:  The patient reported a remote history of seeking out   counseling for a short period of time.  She stated this is the only mental   health contact she had.  When this occurred, she said she sought treatment to   help her adjust to various stressors.    PSYCHOMETRIC TESTING:  The patient was administered 2 psychometric tests and 1   screening instrument.  The PS/PC-R revealed no clinically significant   symptoms of a negative mood.  Her CDR survey showed no problems with level of   consciousness, attention, thinking, perception, speech or memory.  She did   report a loss of vigor and sleep disturbance.  She also reported problems   carrying out her ADLs.  Moreover, the patient admitted to significant and   severe pain.  She denied any problems with her appetite.    The patient was screened for any risk of suicide based on history of suicide   attempts, acute suicidal ideation, severe hopelessness, attraction to death,   family history of suicide and acute overuse of alcohol.  The patient's risk is   considered low.    SOCIAL HISTORY:  The patient was working full time for the [employer] at the time of her initial hospitalization for back problems.  She   is single.  She was living alone in [place], [place].  The patient has some   supportive family/friends who lives nearby.    IMPRESSION:  Adjustment disorder with  anxious mood.    RECOMMENDATIONS:  The patient will be followed for status and supportive care.       ____________________________________     MARIBEL FINK, PHD    JENIFFER / RICHARD    DD:  02/24/2019 15:45:39  DT:  02/24/2019 15:59:51    D#:  [id number]  Job#:  [id number]

## 2019-02-25 NOTE — CARE PLAN
Problem: Pain Management  Goal: Pain level will decrease to patient's comfort goal  Pt request for PRN pain med prior to HS meds r/t chronic back, increased left hip pain. No apparent abnormalities observed upon visual inspection of L hip while pt in bed. Wound vac dressing in place, per MD order to retain until upcoming f/u appt. No discoloration noted, no c/o pain w/palpation to left hip area. Reported some relief after PRN med, from 9/10 to 7/10. After HS meds administered, pt reassessed, resting in bed, eyes closed, resp even, unlabored.     Problem: Urinary Elimination:  Goal: Ability to reestablish a normal urinary elimination pattern will improve  Pt request to use female urinal while in bed. Tolerated well, 400ml yellow UOP. Assisted w/pericare, repositioned for comfort.

## 2019-02-25 NOTE — REHAB-PT IDT TEAM NOTE
Physical Therapy   Mobility  Bed mobility:  Min A with leg   Bed /Chair/Wheelchair Transfer Initial:  3 - Moderate Assistance  Bed /Chair/Wheelchair Transfer Current:  4 - Minimal Assistance   Bed/Chair/Wheelchair Transfer Description:  Adaptive equipment (Leg  and bed control - min A bed mob, CGA SPT FWW)  Walk Initial:  1 - Total Assistance  Walk Current:  2 - Max Assistance   Walk Description:  Walker, Assist device/equipment, Extra time (FWW 20 ft, 80 ft, + 100 ft indoors TTWB)  Wheelchair Initial:  2 - Max Assistance  Wheelchair Current:  5 - Standby Prompting/Supervision or Set-up   Wheelchair Description:   (261 ft indoors in 21 minutes, SPV, min A vc, with tubing to R wheel)  Stairs Initial:  0 - Not tested,medical condition  Stairs Current: 0 - Not tested,unsafe activity   Stairs Description:    Patient/Family Training/Education: Patient and sig other educated on TTWB, safety with mobility, fall prevention   DME/DC Recommendations: Anticipate home health; patient has FWW, wc  Strengths:  Other: Able to maintain TTWB, supportive sig other  Barriers:   Decreased endurance, Generalized weakness, Limited mobility, Poor activity tolerance, Poor balance and Other: Impaired PLOF, hx of falls  # of short term goals set= 2  # of short term goals met= 1       Physical Therapy Problems           Problem: Mobility     Dates: Start: 02/16/19       Goal: STG-Within one week, patient will ambulate household distance     Dates: Start: 02/25/19       Description: 1) Individualized goal:  Amb >50 ft x 4 indoors with FWW SBA  2) Interventions:  PT Group Therapy, PT E Stim Attended, PT Gait Training, PT Therapeutic Exercises, PT Neuro Re-Ed/Balance, PT Aquatic Therapy, PT Therapeutic Activity, PT Manual Therapy and PT Evaluation               Problem: Mobility Transfers     Dates: Start: 02/16/19       Goal: STG-Within one week, patient will transfer bed to chair     Dates: Start: 02/25/19       Description: 1)  Individualized goal:  Patient will transfer SBA with FWW consistently for STS/SPT, bed mob SBA  2) Interventions:  PT Group Therapy, PT E Stim Attended, PT Gait Training, PT Therapeutic Exercises, PT Neuro Re-Ed/Balance, PT Aquatic Therapy, PT Therapeutic Activity and PT Manual Therapy       Note:     Goal Note filed on 02/25/19 1557 by Nellie Lepe, DPT    Goal: STG-Within one week, patient will transfer bed to chair  Outcome: NOT MET  CGA transfers with FWW, min A bed mobility with leg                     Problem: PT-Long Term Goals     Dates: Start: 02/16/19       Goal: LTG-By discharge, patient will ambulate     Dates: Start: 02/16/19       Description: 1) Individualized goal: Gait fww spv 200 ft at discharge  2) Interventions:  PT Gait Training, PT Therapeutic Exercises and PT Therapeutic Activity             Goal: LTG-By discharge, patient will transfer one surface to another     Dates: Start: 02/16/19       Description: 1) Individualized goal: Transfer one surface to another fww supervision at discharge  2) Interventions:  PT Gait Training, PT Therapeutic Exercises and PT Therapeutic Activity             Goal: LTG-By discharge, patient will transfer in/out of a car     Dates: Start: 02/16/19       Description: 1) Individualized goal: Car transfer fww supervision at discharge  2) Interventions:  PT Gait Training, PT Therapeutic Exercises and PT Therapeutic Activity                     Section completed by:  Nellie Lepe PT, DPT

## 2019-02-26 PROCEDURE — 700112 HCHG RX REV CODE 229: Performed by: PHYSICAL MEDICINE & REHABILITATION

## 2019-02-26 PROCEDURE — A9270 NON-COVERED ITEM OR SERVICE: HCPCS | Performed by: PHYSICAL MEDICINE & REHABILITATION

## 2019-02-26 PROCEDURE — 97110 THERAPEUTIC EXERCISES: CPT

## 2019-02-26 PROCEDURE — 700102 HCHG RX REV CODE 250 W/ 637 OVERRIDE(OP): Performed by: PHYSICAL MEDICINE & REHABILITATION

## 2019-02-26 PROCEDURE — 97530 THERAPEUTIC ACTIVITIES: CPT

## 2019-02-26 PROCEDURE — 770010 HCHG ROOM/CARE - REHAB SEMI PRIVAT*

## 2019-02-26 PROCEDURE — 99233 SBSQ HOSP IP/OBS HIGH 50: CPT | Performed by: PHYSICAL MEDICINE & REHABILITATION

## 2019-02-26 PROCEDURE — 97112 NEUROMUSCULAR REEDUCATION: CPT

## 2019-02-26 PROCEDURE — 97535 SELF CARE MNGMENT TRAINING: CPT

## 2019-02-26 RX ADMIN — OXYCODONE HYDROCHLORIDE 10 MG: 10 TABLET ORAL at 07:44

## 2019-02-26 RX ADMIN — MORPHINE SULFATE 30 MG: 30 TABLET, EXTENDED RELEASE ORAL at 19:53

## 2019-02-26 RX ADMIN — METHOCARBAMOL TABLETS 1000 MG: 500 TABLET, COATED ORAL at 13:32

## 2019-02-26 RX ADMIN — ACETAMINOPHEN 1000 MG: 500 TABLET, FILM COATED ORAL at 19:53

## 2019-02-26 RX ADMIN — CALCIUM CARBONATE (ANTACID) CHEW TAB 500 MG 500 MG: 500 CHEW TAB at 09:08

## 2019-02-26 RX ADMIN — ACETAMINOPHEN 1000 MG: 500 TABLET, FILM COATED ORAL at 16:30

## 2019-02-26 RX ADMIN — ASPIRIN 81 MG: 81 TABLET, COATED ORAL at 08:17

## 2019-02-26 RX ADMIN — ASPIRIN 81 MG: 81 TABLET, COATED ORAL at 19:53

## 2019-02-26 RX ADMIN — GABAPENTIN 300 MG: 300 CAPSULE ORAL at 08:16

## 2019-02-26 RX ADMIN — METHOCARBAMOL TABLETS 1000 MG: 500 TABLET, COATED ORAL at 16:30

## 2019-02-26 RX ADMIN — GABAPENTIN 300 MG: 300 CAPSULE ORAL at 19:53

## 2019-02-26 RX ADMIN — METHOCARBAMOL TABLETS 1000 MG: 500 TABLET, COATED ORAL at 08:16

## 2019-02-26 RX ADMIN — ZOLPIDEM TARTRATE 5 MG: 5 TABLET ORAL at 22:05

## 2019-02-26 RX ADMIN — FERROUS SULFATE TAB 325 MG (65 MG ELEMENTAL FE) 325 MG: 325 (65 FE) TAB at 08:17

## 2019-02-26 RX ADMIN — OXYCODONE HYDROCHLORIDE 10 MG: 10 TABLET ORAL at 13:32

## 2019-02-26 RX ADMIN — DOCUSATE SODIUM 100 MG: 100 CAPSULE, LIQUID FILLED ORAL at 19:53

## 2019-02-26 RX ADMIN — GABAPENTIN 300 MG: 300 CAPSULE ORAL at 16:30

## 2019-02-26 RX ADMIN — OXYCODONE HYDROCHLORIDE AND ACETAMINOPHEN 500 MG: 500 TABLET ORAL at 08:17

## 2019-02-26 RX ADMIN — OXYCODONE HYDROCHLORIDE 10 MG: 10 TABLET ORAL at 19:58

## 2019-02-26 RX ADMIN — METHOCARBAMOL TABLETS 1000 MG: 500 TABLET, COATED ORAL at 19:53

## 2019-02-26 RX ADMIN — VITAMIN D, TAB 1000IU (100/BT) 2000 UNITS: 25 TAB at 08:16

## 2019-02-26 RX ADMIN — FERROUS SULFATE TAB 325 MG (65 MG ELEMENTAL FE) 325 MG: 325 (65 FE) TAB at 17:30

## 2019-02-26 RX ADMIN — MORPHINE SULFATE 30 MG: 30 TABLET, EXTENDED RELEASE ORAL at 08:16

## 2019-02-26 RX ADMIN — CYANOCOBALAMIN TAB 1000 MCG 1000 MCG: 1000 TAB at 08:17

## 2019-02-26 RX ADMIN — DOCUSATE SODIUM 100 MG: 100 CAPSULE, LIQUID FILLED ORAL at 08:16

## 2019-02-26 RX ADMIN — ACETAMINOPHEN 1000 MG: 500 TABLET, FILM COATED ORAL at 08:16

## 2019-02-26 ASSESSMENT — GAIT ASSESSMENTS
DISTANCE (FEET): 20
DEVIATION: ANTALGIC;DECREASED BASE OF SUPPORT;BRADYKINETIC;DECREASED HEEL STRIKE
ASSISTIVE DEVICE: FRONT WHEEL WALKER
GAIT LEVEL OF ASSIST: CONTACT GUARD ASSIST

## 2019-02-26 NOTE — REHAB-PHARMACY IDT TEAM NOTE
Pharmacy   Pharmacy  Antibiotics/Antifungals/Antivirals:  Medication:      Active Orders     None        Route:         n/a  Stop Date:  n/a  Reason:   Antihypertensives/Cardiac:  Medication:    Orders (72h ago through future)    Start     Ordered    02/15/19 1554  hydrALAZINE (APRESOLINE) tablet 25 mg  EVERY 8 HOURS PRN      02/15/19 1554        Patient Vitals for the past 24 hrs:   BP Pulse   02/25/19 1500 113/72 (!) 106   02/25/19 0700 (!) 98/50 87   02/24/19 1900 126/66 (!) 103       Anticoagulation:  Medication:  Aspirin,   INR:      Other key medications: gabapentin, methocarbamol  A review of the medication list reveals no issues at this time.  Section completed by:  Susana Greer RPH

## 2019-02-26 NOTE — CARE PLAN
Problem: Urinary Elimination:  Goal: Ability to reestablish a normal urinary elimination pattern will improve  Outcome: PROGRESSING SLOWER THAN EXPECTED  Pt requests to use female urinal at night and is a max assist.

## 2019-02-26 NOTE — DISCHARGE PLANNING
"Case lucia.  · Late entry from 2/25.    I again met with pt explaining to her I cannot access her medical records and informed her we have gone through the proper channels to obtain records.  Medical records has her release of information form and has 30 days to process request.  I spoke with medical records on Friday requesting records be \"expedited\".  Encouraged pt to call to confirms status of request.      I met with pt confirming referral for [facility] has been placed, and they have accepted referral.     Pt confirms she has a fww/wc @ home.   SO will pick her up on Friday after work---closer to 530pm.    [facility]    Pt has appt w/ Dr. Levine Ortho tomorrow @ 2:15pm.   Pt is requesting transport stop by Medical Records Dept / [place] in order for her to  her records. Informed her I would need to confirm with MD and transport availability before this can be confirmed.     · 2/26/2019  Message left from pt; she has contacted supervisor from Medical Records to make arrangements for obtainig medical records.  Pt reports she no longer needs to go to [place] office.         "

## 2019-02-26 NOTE — CARE PLAN
Problem: Skin Integrity  Goal: Risk for impaired skin integrity will decrease    Intervention: Assess and monitor skin integrity, appearance and/or temperature  Patient coccyx wound dressing changed and redressed per order, wound appears to be healing. Will continue to monitor and assess.      Problem: Pain Management  Goal: Pain level will decrease to patient's comfort goal    Intervention: Educate and implement non-pharmacologic comfort measures. Examples: relaxation, distration, play therapy, activity therapy, massage, etc.  Patient able to verbalize pain level and verbalize an acceptable level of pain.

## 2019-02-26 NOTE — CARE PLAN
Problem: Safety  Goal: Will remain free from falls    Intervention: Assess risk factors for falls  Pt uses call light consistently and appropriately. Waits for assistance does not attempt self transfer this shift. Able to verbalize needs.      Problem: Pain Management  Goal: Pain level will decrease to patient's comfort goal    Intervention: Follow pain managment plan developed in collaboration with patient and Interdisciplinary Team  Patient able to verbalize pain level and verbalize an acceptable level of pain.

## 2019-02-26 NOTE — CARE PLAN
Problem: Communication  Goal: The ability to communicate needs accurately and effectively will improve  Outcome: PROGRESSING AS EXPECTED  Pt is able to effectively communicate her needs to staff.    Problem: Safety  Goal: Will remain free from injury  Outcome: PROGRESSING AS EXPECTED  Pt uses call light consistently for staff assistance. Pt has good safety awareness, no impulsivity observed.    Problem: Bowel/Gastric:  Goal: Normal bowel function is maintained or improved  Outcome: PROGRESSING SLOWER THAN EXPECTED  Pt is continent of bowel with last BM 2/24. Will offer PRN in AM if no BM overnight.
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
## 2019-02-26 NOTE — PROGRESS NOTES
Patient is AOx4 uses the call light when assistance is needed and has not attempted to self-transfer so far this shift. Patient has pain that she states is controlled at intervals with scheduled and prn pain management. Patient has been up participating in therapies.

## 2019-02-26 NOTE — PROGRESS NOTES
Patient is alert, uses the call light when assistance is needed and has not attempted to self-transfer so far today.patient has been up participating in therapies.

## 2019-02-26 NOTE — REHAB-COLLABORATIVE ONGOING IDT TEAM NOTE
Weekly Interdisciplinary Team Conference Note    Weekly Interdisciplinary Team Conference # 2  Date:  2/26/2019    Clinicians present and reporting at team conference include the following:   MD: Yanira Yen MD   RN:  Rika Whelan RN   PT:   Nellie Lepe, PT, DPT  OT:  Femi Garner MS OTR/L  and GOLD Romero   ST:  None  CM:  Yareli Rico, LONDONW, DECLAN  REC:  Not Applicable  RT:  Not Applicable  RPh:  Susana Greer [facility]  Other:   None  All reporting clinicians have a working knowledge of this patient's plan of care.    Targeted DC Date:  Friday 3/1/2019     Medical    Patient Active Problem List    Diagnosis Date Noted   • Mild intermittent asthma without complication 04/25/2012     Priority: High   • Status post left hip replacement 02/13/2019   • Primary insomnia 01/03/2019   • Mixed stress and urge urinary incontinence 01/03/2019   • Edema 12/13/2018   • Vitamin D deficiency 12/13/2018   • Anemia 12/13/2018   • Cervical myelopathy (HCC) 12/11/2018   • Encounter for work capability assessment- FMLA PAPERWORK 08/29/2018   • DDD (degenerative disc disease), cervical- MOD-[facility] SPINE [facility]- dr brennan; fusion and laminectomy C3-T1 12/5/2018 dr brennan 08/09/2018   • Primary osteoarthritis of left hip- dr nowak; left THR tbd feb 6, 2019 06/07/2018   • Obesity (BMI 30-39.9) 06/07/2018   • DDD (degenerative disc disease), lumbar 04/25/2018   • Uncomplicated opioid dependence (CMS-HCC)- teresa adkins 01/31/2018   • Essential hypertension 01/31/2018   • Venous insufficiency 10/03/2017   • Colon cancer screening- needs 08/09/2017   • Chronic bilateral low back pain with bilateral sciatica- pain mgt; TERESA ADKINS 07/26/2017   • Vitamin B 12 deficiency 06/02/2016   • History of gastric bypass 04/25/2012     Results     ** No results found for the last 24 hours. **           Nursing  Diet/Nutrition:  Regular and Thin Liquids  Medication Administration:  Whole with Liquid Wash  % consumed at meals in last 24 hours:   Consumed % of meals per documentation.  Meal/Snack Consumption for the past 24 hrs:   Oral Nutrition   02/25/19 1232 Lunch;Between % Consumed   02/25/19 0900 Breakfast;Between % Consumed       Snack schedule:  None  Appetite:  Good  Admit Weight:  Weight: 87.5 kg (192 lb 14.4 oz)  Weight Last 7 Days   [84.4 kg (186 lb 1.6 oz)] 84.4 kg (186 lb 1.6 oz) (02/24 1620)    Pain Issues:    Location:  Back (02/25 2252)  Lower (02/25 2252)         Severity:  Severe  8-9/10  Scheduled pain medications:  gabapentin (NEURONTIN)  and morphine SR (MS CONTIN)  + Robaxin + Tylenol  PRN pain medications used in last 24 hours:  oxycodone immediate release (ROXICODONE)    Non Pharmacologic Interventions:  warm blanket, distraction, emotional support, relaxation, repositioned and rest  Effectiveness of pain management plan:  fair=improved comfort with interventions but does not always meet goal    Bowel:    Bowel Assist Initial Score:  1 - Total Assistance  Bowel Assist Current Score:  2 - Max Assistance  Bowl Accidents in last 7 days:     Last bowel movement: 02/24/19  Stool Description: Large, Brown, Hard     Usual bowel pattern:  every other day  Scheduled bowel medications:  docusate sodium (COLACE)  PRN bowel medications used in last 24 hours:  None  Nursing Interventions:  Increased time, PRN medication, Scheduled medication, Supervision, Set-up, Positioning on commode/toilet, Verbal cueing, Brief  Effectiveness of bowel program:   fair=sometimes needs prn bowel meds for constipation     Bladder:    Bladder Assist Initial Score:  1 - Total Assistance  Bladder Assist Current Score:  2 - Max Assistance  Bladder Accidents in last 7 days:  0  Medications affecting bladder:  None    Time void schedule/voiding pattern:  Voiding every 2-4 hours  Interventions:  Increased time, Urinal, Supervision, Verbal cueing, Emptying of device, Time void patient initiated (staff positions female urinal and holds int there for pt to  void then staff empties urinal. )  Effectiveness of bladder training:  Good=regular, predictable, emptying of bladder, patient initiates time voiding    Wound:     Patient Lines/Drains/Airways Status    Active Current Wounds     Name: Placement date: Placement time: Site: Days:    Wound 02/15/19 Partial Thickness Wound Buttocks Left partial thickness wound 02/15/19         11                       Interventions:    EVERY SEVENTY-TWO HOURS     Comments: L buttock: Clean with NS, dry. Apply piece of hydrocolloid. Cover with Mepilex. Nsg to change every 3 days and PRN saturation or        Effectiveness of intervention:  wound is improving     Wound Vac Location:  left hip  Dressing last changed:  Although vac no longer active, dressing to remain in place until doctor appointment 2/26 per Dr. Levine's office.  Pump Mode Pressure Setting       Description of drainage:  Not active    Sleep/wake cycle:   Average hours slept:  Sleeps 4-6 hours without waking  Sleep medication usage:  zolpidem (AMBIEN) tablet 5 mg nightly PRN    Patient/Family Training/Education:  Bladder Management/Training, Fall Prevention, General Self Care, Medication Management, Pain Management, Safe Transfers, Safety, Skin Care and Wound Care  Discharge Supply Recommendations:  Wound Care Supplies  Strengths: Alert and oriented, Willingly participates in therapeutic activities, Able to follow instructions and Pleasant and cooperative   Barriers:   Constipation, Fatigue, Generalized weakness, Limited mobility and Pain poorly managed            Nursing Problems           Problem: Bowel/Gastric:     Goal: Normal bowel function is maintained or improved           Goal: Will not experience complications related to bowel motility             Problem: Communication     Goal: The ability to communicate needs accurately and effectively will improve             Problem: Discharge Barriers/Planning     Goal: Patient's continuum of care needs will be met              Problem: Infection     Goal: Will remain free from infection             Problem: Knowledge Deficit     Goal: Knowledge of disease process/condition, treatment plan, diagnostic tests, and medications will improve           Goal: Knowledge of the prescribed therapeutic regimen will improve             Problem: Mobility     Goal: Risk for activity intolerance will decrease             Problem: Pain Management     Goal: Pain level will decrease to patient's comfort goal     Flowsheet:     Taken at 02/25/19 0229    Pain Rating Scale (NPRS) Focus of attention, prevents doing daily activities by Kerwin Encinas R.N.    Non Verbal Scale  Calm by Kerwin Encinas R.N.    Comfort Goal Comfort at Rest;Sleep Comfortably by Kerwin Encinas R.N.    Taken at 02/24/19 0990    Comfort Goal Comfort at Rest;Stay Alert;Perform Activity;Comfort with Movement by JUDY LarsonPIRVIN    Pain Rating Scale (NPRS) Hard to ignore, avoid usual activities by JUDY LarsonPIRVIN                  Problem: Psychosocial Needs:     Goal: Level of anxiety will decrease             Problem: Safety     Goal: Will remain free from injury           Goal: Will remain free from falls             Problem: Skin Integrity     Goal: Risk for impaired skin integrity will decrease             Problem: Urinary Elimination:     Goal: Ability to reestablish a normal urinary elimination pattern will improve             Problem: Venous Thromboembolism (VTW)/Deep Vein Thrombosis (DVT) Prevention:     Goal: Patient will participate in Venous Thrombosis (VTE)/Deep Vein Thrombosis (DVT)Prevention Measures                  Long Term Goals:   At discharge patient will be able to function safely at home and in the community with support.    Section completed by:  Yakov Zaarte R.N.  Read and reviewed by Rika Whelan R.N.            Mobility  Bed mobility:  Min A with leg   Bed /Chair/Wheelchair Transfer Initial:  3 - Moderate  Assistance  Bed /Chair/Wheelchair Transfer Current:  4 - Minimal Assistance   Bed/Chair/Wheelchair Transfer Description:  Adaptive equipment (Leg  and bed control - min A bed mob, CGA SPT FWW)  Walk Initial:  1 - Total Assistance  Walk Current:  2 - Max Assistance   Walk Description:  Walker, Assist device/equipment, Extra time (FWW 20 ft, 80 ft, + 100 ft indoors TTWB)  Wheelchair Initial:  2 - Max Assistance  Wheelchair Current:  5 - Standby Prompting/Supervision or Set-up   Wheelchair Description:   (261 ft indoors in 21 minutes, SPV, min A vc, with tubing to R wheel)  Stairs Initial:  0 - Not tested,medical condition  Stairs Current: 0 - Not tested,unsafe activity   Stairs Description:    Patient/Family Training/Education: Patient and sig other educated on TTWB, safety with mobility, fall prevention   DME/DC Recommendations: Anticipate home health; patient has FWW, wc  Strengths:  Other: Able to maintain TTWB, supportive sig other  Barriers:   Decreased endurance, Generalized weakness, Limited mobility, Poor activity tolerance, Poor balance and Other: Impaired PLOF, hx of falls  # of short term goals set= 2  # of short term goals met= 1       Physical Therapy Problems           Problem: Mobility     Dates: Start: 02/16/19       Goal: STG-Within one week, patient will ambulate household distance     Dates: Start: 02/25/19       Description: 1) Individualized goal:  Amb >50 ft x 4 indoors with FWW SBA  2) Interventions:  PT Group Therapy, PT E Stim Attended, PT Gait Training, PT Therapeutic Exercises, PT Neuro Re-Ed/Balance, PT Aquatic Therapy, PT Therapeutic Activity, PT Manual Therapy and PT Evaluation               Problem: Mobility Transfers     Dates: Start: 02/16/19       Goal: STG-Within one week, patient will transfer bed to chair     Dates: Start: 02/25/19       Description: 1) Individualized goal:  Patient will transfer SBA with FWW consistently for STS/SPT, bed mob SBA  2) Interventions:  PT Group  Therapy, PT E Stim Attended, PT Gait Training, PT Therapeutic Exercises, PT Neuro Re-Ed/Balance, PT Aquatic Therapy, PT Therapeutic Activity and PT Manual Therapy       Note:     Goal Note filed on 02/25/19 1557 by Nellie Lepe, DPT    Goal: STG-Within one week, patient will transfer bed to chair  Outcome: NOT MET  CGA transfers with FWW, min A bed mobility with leg                     Problem: PT-Long Term Goals     Dates: Start: 02/16/19       Goal: LTG-By discharge, patient will ambulate     Dates: Start: 02/16/19       Description: 1) Individualized goal: Gait fww spv 200 ft at discharge  2) Interventions:  PT Gait Training, PT Therapeutic Exercises and PT Therapeutic Activity             Goal: LTG-By discharge, patient will transfer one surface to another     Dates: Start: 02/16/19       Description: 1) Individualized goal: Transfer one surface to another fww supervision at discharge  2) Interventions:  PT Gait Training, PT Therapeutic Exercises and PT Therapeutic Activity             Goal: LTG-By discharge, patient will transfer in/out of a car     Dates: Start: 02/16/19       Description: 1) Individualized goal: Car transfer fww supervision at discharge  2) Interventions:  PT Gait Training, PT Therapeutic Exercises and PT Therapeutic Activity                     Section completed by:  Nellie Lepe PT, DPT        Activities of Daily Living  Eating Initial:  7 - Independent  Eating Current:  5 - Standby Prompting/Supervision or Set-up   Eating Description:   (setup of food tray  opening packages and containers)  Grooming Initial:  4 - Minimal Assistance  Grooming Current:  5 - Standby Prompting/Supervision or Set-up   Grooming Description:   (oral care )  Bathing Initial:  3 - Moderate Assistance  Bathing Current:  4 - Minimal Assistance   Bathing Description:  Grab bar, Tub bench, Hand held shower, Long handled bath tool ( Therapist washed/ rinsed buttocks    provided assist for quality using long  sponge for feet.  patient washed left UE using long sponge. )  Upper Body Dressing Initial:  3 - Moderate Assistance  Upper Body Dressing Current:  4 - Minimal Assistance   Upper Body Dressing Description:  Requires minimal contact (Min A to pull shirt down with VCing for orientation of shirt and sequencing of steps)  Lower Body Dressing Initial:  2 - Max Assistance  Lower Body Dressing Current:2  Max to   1 - Total Assistance   Lower Body Dressing Description:  1 - Total Assistance  Toileting Initial:  1 - Total Assistance  Toileting Current:  1 - Total Assistance   Toileting Description:  Grab bar, Increased time, Verbal cueing, Initial preparation for task  Toilet Transfer Initial:  3 - Moderate Assistance  Toilet Transfer Current:  5 - Standby Prompting/Supervision or Set-up   Toilet Transfer Description:  5 - Standby Prompting/Supervision or Set-up  Tub / Shower Transfer Initial:  4 - Minimal Assistance  Tub / Shower Transfer Current:  5 - Standby Prompting/Supervision or Set-up   Tub / Shower Transfer Description:  Grab bar ( SBA )  IADL: not yet addressed   Family Training/Education:  Initiated with BF observing LB dressing and tub  Bench transfer   DME/DC Recommendations: tub bench  Grab bars.  Hip kit    Strengths:  Able to follow instructions, Effective communication skills, Making steady progress towards goals, Manages pain appropriately, Motivated for self care and independence, Pleasant and cooperative, Supportive family and Willingly participates in therapeutic activities  Barriers:  Decreased endurance, Generalized weakness and Poor activity tolerance  Self limiting      # of short term goals set=9  # of short term goals met= 4       Occupational Therapy Goals           Problem: Dressing     Dates: Start: 02/16/19       Goal: STG-Within one week, patient will dress UB     Dates: Start: 02/16/19   Expected End: 02/23/19       Description: 1) Individualized Goal:  At a level of CGA.   2) Interventions:   OT Self Care/ADL, OT Manual Ther Technique, OT Neuro Re-Ed/Balance, OT Therapeutic Activity, OT Evaluation and OT Therapeutic Exercise       Note:     Goal Note filed on 02/25/19 1458 by Isaiah Montalvo, C.O.T.A.    Goal: STG-Within one week, patient will dress UB  Outcome: NOT MET  Continued min assist   Limited by decreased right UE function                  Goal: STG-Within one week, patient will dress LB     Dates: Start: 02/16/19   Expected End: 02/23/19       Description: 1) Individualized Goal:  At a level of Mod A with use of AE.   2) Interventions:  OT Self Care/ADL, OT Manual Ther Technique, OT Therapeutic Activity, OT Evaluation and OT Therapeutic Exercise       Note:     Goal Note filed on 02/25/19 1458 by Isaiah Montalvo, C.O.T.A.    Goal: STG-Within one week, patient will dress LB  Outcome: NOT MET   Continued max assist    limited by pain , self limiting behavior. Easily distracted                   Goal: STG-Within one week, patient will retrieve clothing     Dates: Start: 02/16/19   Expected End: 02/23/19       Description: 1) Individualized Goal:  At a level of SBA.   2) Interventions:  OT E Stim Attended, OT Self Care/ADL, OT Manual Ther Technique, OT Neuro Re-Ed/Balance, OT Therapeutic Activity, OT Evaluation and OT Therapeutic Exercise       Note:     Goal Note filed on 02/25/19 1458 by Isaiah Montalvo, C.O.T.A.    Goal: STG-Within one week, patient will retrieve clothing  Outcome: NOT MET  Not addressed                   Problem: IADL's     Dates: Start: 02/16/19       Goal: STG-Within one week, patient will access kitchen area     Dates: Start: 02/16/19   Expected End: 02/23/19       Description: 1) Individualized Goal: at a level of CGA.   2) Interventions:  OT Self Care/ADL, OT Community Reintegration, OT Manual Ther Technique, OT Neuro Re-Ed/Balance, OT Therapeutic Activity and OT Therapeutic Exercise       Note:     Goal Note filed on 02/25/19 1458 by Isaiah Montalvo, C.O.T.A.    Goal:  STG-Within one week, patient will access kitchen area  Outcome: NOT MET   Not addressed                     Problem: OT Long Term Goals     Dates: Start: 02/16/19       Goal: LTG-By discharge, patient will complete basic self care tasks     Dates: Start: 02/16/19   Expected End: 03/02/19       Description: 1) Individualized Goal:  At a level of Min A.   2) Interventions:  OT Self Care/ADL, OT Neuro Re-Ed/Balance, OT Therapeutic Activity, OT Evaluation and OT Therapeutic Exercise             Goal: LTG-By discharge, patient will perform bathroom transfers     Dates: Start: 02/16/19   Expected End: 03/02/19       Description: 1) Individualized Goal:  At a level of Mod I.   2) Interventions:  OT E Stim Attended, OT Group Therapy, OT Self Care/ADL, OT Community Reintegration, OT Neuro Re-Ed/Balance, OT Therapeutic Activity, OT Evaluation and OT Therapeutic Exercise             Goal: LTG-By discharge, patient will complete basic home management     Dates: Start: 02/16/19   Expected End: 03/02/19       Description: 1) Individualized Goal:  At a level of Min A.   2) Interventions:  OT Self Care/ADL, OT Community Reintegration, OT Manual Ther Technique, OT Neuro Re-Ed/Balance, and OT Therapeutic Activity.                  Problem: Toileting     Dates: Start: 02/16/19       Goal: STG-Within one week, patient will complete toileting tasks     Dates: Start: 02/16/19   Expected End: 02/23/19       Description: 1) Individualized Goal:  At a level of CGA.   2) Interventions:  OT Self Care/ADL, OT Community Reintegration, OT Manual Ther Technique, OT Neuro Re-Ed/Balance, OT Therapeutic Activity and OT Therapeutic Exercise       Note:     Goal Note filed on 02/25/19 9128 by Isaiah Montalvo, C.O.T.ADustin    Goal: STG-Within one week, patient will complete toileting tasks  Outcome: NOT MET   Continued  Max to total assist    limited by pain , self limiting behavior.                      Section completed by:  ROMANA Cobb.O.T.ADustin/Gunnar  "ANTONIO Quigley, OT/L             Nutrition       Dietary Problems (Active)            There are no active problems.        Nutrition services: Day 3 of admit.  Karine Ovalle is a 53 y.o. female with admitting DX of unilateral hip fracture (L).     Consult received for supplements and pt with noted wound.     Spoke with pt in room. Pt happy with the meals. Notes no current updates to preferences at this time. Regarding recent wt fluctuations, pt unaware, though stated she hopes she is losing wt.     Assessment:  Height: 160 cm (5' 3\")  Weight: 87.5 kg (192 lb 14.4 oz)  Body mass index is 34.17 kg/m².  Diet/Intake: Regular % x 7 and <50% x 1    Evaluation:   Meds: Vit B12 1000mcg, Colace 100mg BID, Ferrous Sulfate 325mg QAM, Vit D 54879 q 7 days, robaxin, has PRN oxycodone 10mg q3 hours (currently utilizing)  Skin: Pt with wound vac to L hip and noted wound to L buttock and reviewed with nursing. No WT consult at this time.   GI/: Last BM 2/15 and has scheduled colace   Labs: Hgb 7.9 Glu 105 Ca 8.4 A1C 5.0%   Pt with hx of gastric bypass.     Malnutrition Risk: No indication for malnutrition risk at this time.     Recommendations/Plan:  1. Diet as order. Current PO intake adequate.   2. Supplements not indicated at this time given adequate PO.   3. Encourage intake of 50% or greater.   4. Document intake of all meals  as % taken in ADL's to provide interdisciplinary communication across all shifts.   5. Monitor weight.  6. Nutrition rep will continue to see patient for ongoing meal and snack preferences.     RD to monitor per protocol. PO intake adequate and no supplements indicated at this time. RD available PRN.     Section completed by:  Ofelia Metcalf R.D.    REHAB-Pharmacy IDT Team Note by Susana Greer RPH at 2/25/2019  4:29 PM  Version 1 of 1    Author:  Susana Greer RPH Service:  (none) Author Type:  Pharmacist    Filed:  2/25/2019  4:33 PM Date of Service:  2/25/2019  4:29 PM " Status:  Signed    :  Susana Greer RPH (Pharmacist)         Pharmacy   Pharmacy  Antibiotics/Antifungals/Antivirals:  Medication:      Active Orders     None        Route:         n/a  Stop Date:  n/a  Reason:   Antihypertensives/Cardiac:  Medication:    Orders (72h ago through future)    Start     Ordered    02/15/19 1554  hydrALAZINE (APRESOLINE) tablet 25 mg  EVERY 8 HOURS PRN      02/15/19 1554        Patient Vitals for the past 24 hrs:   BP Pulse   02/25/19 1500 113/72 (!) 106   02/25/19 0700 (!) 98/50 87   02/24/19 1900 126/66 (!) 103       Anticoagulation:  Medication:  Aspirin,   INR:      Other key medications: gabapentin, methocarbamol  A review of the medication list reveals no issues at this time.  Section completed by:  Susana Greer RPH[TK.1]        Attribution Key     TK.1 - Susana Greer RPH on 2/25/2019  4:29 PM                    DC Planning  DC destination/dispostion:  Home ([facility] apartment)  where she lives alone; pt has fww/wc/spc/ tub transfer bench @ home.    So will provided transporation home on 3/1.  Pt obtained medical records as she requested for her insurance claim.     Referrals:  [facility] has accepted pt. Follow up appt made with PCP/ Dr. Abner Brown.      DC Needs:  As listed above.     Barriers to discharge: None        Strengths: Alert/oriented; self advocate; cooperative, support from her SO with transition home.      Section completed by:  GERMAN Kovacs, CCM     Summary:  Self limiting at times.  Physician Summary  MARIANA Harper MD  participated and led team conference discussion.

## 2019-02-26 NOTE — REHAB-CM IDT TEAM NOTE
Case Management    DC Planning  DC destination/dispostion:  Home ([facility] apartment)  where she lives alone; pt has fww/wc/spc/ tub transfer bench @ home.    So will provided transporation home on 3/1.  Pt obtained medical records as she requested for her insurance claim.     Ref[facility]  [facility] Follow up appt made with PCP/ Dr. Abner Brown.      DC Needs:  As listed above.     Barriers to discharge: None        Strengths: Alert/oriented; self advocate; cooperative, support from her SO with transition home.      Section completed by:  GERMAN Kovacs, CCM

## 2019-02-26 NOTE — REHAB-NURSING IDT TEAM NOTE
Nursing   Nursing  Diet/Nutrition:  Regular and Thin Liquids  Medication Administration:  Whole with Liquid Wash  % consumed at meals in last 24 hours:  Consumed % of meals per documentation.  Meal/Snack Consumption for the past 24 hrs:   Oral Nutrition   02/25/19 1232 Lunch;Between % Consumed   02/25/19 0900 Breakfast;Between % Consumed       Snack schedule:  None  Appetite:  Good  Admit Weight:  Weight: 87.5 kg (192 lb 14.4 oz)  Weight Last 7 Days   [84.4 kg (186 lb 1.6 oz)] 84.4 kg (186 lb 1.6 oz) (02/24 1620)    Pain Issues:    Location:  Back (02/25 2252)  Lower (02/25 2252)         Severity:  Severe  8-9/10  Scheduled pain medications:  gabapentin (NEURONTIN)  and morphine SR (MS CONTIN)  + Robaxin + Tylenol  PRN pain medications used in last 24 hours:  oxycodone immediate release (ROXICODONE)    Non Pharmacologic Interventions:  warm blanket, distraction, emotional support, relaxation, repositioned and rest  Effectiveness of pain management plan:  fair=improved comfort with interventions but does not always meet goal    Bowel:    Bowel Assist Initial Score:  1 - Total Assistance  Bowel Assist Current Score:  2 - Max Assistance  Bowl Accidents in last 7 days:     Last bowel movement: 02/24/19  Stool Description: Large, Brown, Hard     Usual bowel pattern:  every other day  Scheduled bowel medications:  docusate sodium (COLACE)  PRN bowel medications used in last 24 hours:  None  Nursing Interventions:  Increased time, PRN medication, Scheduled medication, Supervision, Set-up, Positioning on commode/toilet, Verbal cueing, Brief  Effectiveness of bowel program:   fair=sometimes needs prn bowel meds for constipation     Bladder:    Bladder Assist Initial Score:  1 - Total Assistance  Bladder Assist Current Score:  2 - Max Assistance  Bladder Accidents in last 7 days:  0  Medications affecting bladder:  None    Time void schedule/voiding pattern:  Voiding every 2-4 hours  Interventions:   Increased time, Urinal, Supervision, Verbal cueing, Emptying of device, Time void patient initiated (staff positions female urinal and holds int there for pt to void then staff empties urinal. )  Effectiveness of bladder training:  Good=regular, predictable, emptying of bladder, patient initiates time voiding    Wound:     Patient Lines/Drains/Airways Status    Active Current Wounds     Name: Placement date: Placement time: Site: Days:    Wound 02/15/19 Partial Thickness Wound Buttocks Left partial thickness wound 02/15/19         11                       Interventions:    EVERY SEVENTY-TWO HOURS     Comments: L buttock: Clean with NS, dry. Apply piece of hydrocolloid. Cover with Mepilex. Nsg to change every 3 days and PRN saturation or        Effectiveness of intervention:  wound is improving     Wound Vac Location:  left hip  Dressing last changed:  Although vac no longer active, dressing to remain in place until doctor appointment 2/26 per Dr. Levine's office.  Pump Mode Pressure Setting       Description of drainage:  Not active    Sleep/wake cycle:   Average hours slept:  Sleeps 4-6 hours without waking  Sleep medication usage:  zolpidem (AMBIEN) tablet 5 mg nightly PRN    Patient/Family Training/Education:  Bladder Management/Training, Fall Prevention, General Self Care, Medication Management, Pain Management, Safe Transfers, Safety, Skin Care and Wound Care  Discharge Supply Recommendations:  Wound Care Supplies  Strengths: Alert and oriented, Willingly participates in therapeutic activities, Able to follow instructions and Pleasant and cooperative   Barriers:   Constipation, Fatigue, Generalized weakness, Limited mobility and Pain poorly managed            Nursing Problems           Problem: Bowel/Gastric:     Goal: Normal bowel function is maintained or improved           Goal: Will not experience complications related to bowel motility             Problem: Communication     Goal: The ability to communicate  needs accurately and effectively will improve             Problem: Discharge Barriers/Planning     Goal: Patient's continuum of care needs will be met             Problem: Infection     Goal: Will remain free from infection             Problem: Knowledge Deficit     Goal: Knowledge of disease process/condition, treatment plan, diagnostic tests, and medications will improve           Goal: Knowledge of the prescribed therapeutic regimen will improve             Problem: Mobility     Goal: Risk for activity intolerance will decrease             Problem: Pain Management     Goal: Pain level will decrease to patient's comfort goal     Flowsheet:     Taken at 02/25/19 0229    Pain Rating Scale (NPRS) Focus of attention, prevents doing daily activities by Kerwin Encinas R.N.    Non Verbal Scale  Calm by Kerwin Encinas R.N.    Comfort Goal Comfort at Rest;Sleep Comfortably by Kerwin Encinas R.N.    Taken at 02/24/19 0940    Comfort Goal Comfort at Rest;Stay Alert;Perform Activity;Comfort with Movement by JUDY LarsonPIRVIN    Pain Rating Scale (NPRS) Hard to ignore, avoid usual activities by JUDY LarsonP.N.                  Problem: Psychosocial Needs:     Goal: Level of anxiety will decrease             Problem: Safety     Goal: Will remain free from injury           Goal: Will remain free from falls             Problem: Skin Integrity     Goal: Risk for impaired skin integrity will decrease             Problem: Urinary Elimination:     Goal: Ability to reestablish a normal urinary elimination pattern will improve             Problem: Venous Thromboembolism (VTW)/Deep Vein Thrombosis (DVT) Prevention:     Goal: Patient will participate in Venous Thrombosis (VTE)/Deep Vein Thrombosis (DVT)Prevention Measures                  Long Term Goals:   At discharge patient will be able to function safely at home and in the community with support.    Section completed by:  Yakov Zarate R.N.   Read and reviewed by Rika Whelan R.N.

## 2019-02-27 PROCEDURE — 97110 THERAPEUTIC EXERCISES: CPT

## 2019-02-27 PROCEDURE — 700112 HCHG RX REV CODE 229: Performed by: PHYSICAL MEDICINE & REHABILITATION

## 2019-02-27 PROCEDURE — A9270 NON-COVERED ITEM OR SERVICE: HCPCS | Performed by: PHYSICAL MEDICINE & REHABILITATION

## 2019-02-27 PROCEDURE — 97116 GAIT TRAINING THERAPY: CPT

## 2019-02-27 PROCEDURE — 97530 THERAPEUTIC ACTIVITIES: CPT

## 2019-02-27 PROCEDURE — 770010 HCHG ROOM/CARE - REHAB SEMI PRIVAT*

## 2019-02-27 PROCEDURE — 700102 HCHG RX REV CODE 250 W/ 637 OVERRIDE(OP): Performed by: PHYSICAL MEDICINE & REHABILITATION

## 2019-02-27 PROCEDURE — 97535 SELF CARE MNGMENT TRAINING: CPT

## 2019-02-27 PROCEDURE — 99231 SBSQ HOSP IP/OBS SF/LOW 25: CPT | Performed by: PHYSICAL MEDICINE & REHABILITATION

## 2019-02-27 RX ADMIN — MORPHINE SULFATE 30 MG: 30 TABLET, EXTENDED RELEASE ORAL at 08:23

## 2019-02-27 RX ADMIN — OXYCODONE HYDROCHLORIDE 10 MG: 10 TABLET ORAL at 21:04

## 2019-02-27 RX ADMIN — OXYCODONE HYDROCHLORIDE 10 MG: 10 TABLET ORAL at 12:04

## 2019-02-27 RX ADMIN — ZOLPIDEM TARTRATE 5 MG: 5 TABLET ORAL at 23:27

## 2019-02-27 RX ADMIN — DOCUSATE SODIUM 100 MG: 100 CAPSULE, LIQUID FILLED ORAL at 08:23

## 2019-02-27 RX ADMIN — OXYCODONE HYDROCHLORIDE 10 MG: 10 TABLET ORAL at 17:26

## 2019-02-27 RX ADMIN — ACETAMINOPHEN 1000 MG: 500 TABLET, FILM COATED ORAL at 14:09

## 2019-02-27 RX ADMIN — OXYCODONE HYDROCHLORIDE AND ACETAMINOPHEN 500 MG: 500 TABLET ORAL at 08:23

## 2019-02-27 RX ADMIN — GABAPENTIN 300 MG: 300 CAPSULE ORAL at 08:23

## 2019-02-27 RX ADMIN — ACETAMINOPHEN 1000 MG: 500 TABLET, FILM COATED ORAL at 21:00

## 2019-02-27 RX ADMIN — METHOCARBAMOL TABLETS 1000 MG: 500 TABLET, COATED ORAL at 12:04

## 2019-02-27 RX ADMIN — GABAPENTIN 300 MG: 300 CAPSULE ORAL at 21:00

## 2019-02-27 RX ADMIN — GABAPENTIN 300 MG: 300 CAPSULE ORAL at 14:09

## 2019-02-27 RX ADMIN — ASPIRIN 81 MG: 81 TABLET, COATED ORAL at 21:00

## 2019-02-27 RX ADMIN — DOCUSATE SODIUM 100 MG: 100 CAPSULE, LIQUID FILLED ORAL at 21:00

## 2019-02-27 RX ADMIN — FERROUS SULFATE TAB 325 MG (65 MG ELEMENTAL FE) 325 MG: 325 (65 FE) TAB at 08:23

## 2019-02-27 RX ADMIN — ACETAMINOPHEN 1000 MG: 500 TABLET, FILM COATED ORAL at 08:23

## 2019-02-27 RX ADMIN — METHOCARBAMOL TABLETS 1000 MG: 500 TABLET, COATED ORAL at 17:26

## 2019-02-27 RX ADMIN — MORPHINE SULFATE 30 MG: 30 TABLET, EXTENDED RELEASE ORAL at 21:00

## 2019-02-27 RX ADMIN — FERROUS SULFATE TAB 325 MG (65 MG ELEMENTAL FE) 325 MG: 325 (65 FE) TAB at 17:26

## 2019-02-27 RX ADMIN — VITAMIN D, TAB 1000IU (100/BT) 2000 UNITS: 25 TAB at 08:23

## 2019-02-27 RX ADMIN — CYANOCOBALAMIN TAB 1000 MCG 1000 MCG: 1000 TAB at 08:23

## 2019-02-27 RX ADMIN — METHOCARBAMOL TABLETS 1000 MG: 500 TABLET, COATED ORAL at 21:00

## 2019-02-27 RX ADMIN — ASPIRIN 81 MG: 81 TABLET, COATED ORAL at 08:23

## 2019-02-27 RX ADMIN — METHOCARBAMOL TABLETS 1000 MG: 500 TABLET, COATED ORAL at 08:22

## 2019-02-27 ASSESSMENT — GAIT ASSESSMENTS
DISTANCE (FEET): 40
ASSISTIVE DEVICE: FRONT WHEEL WALKER
DEVIATION: ANTALGIC;DECREASED HEEL STRIKE;DECREASED TOE OFF
GAIT LEVEL OF ASSIST: CONTACT GUARD ASSIST

## 2019-02-27 NOTE — DISCHARGE PLANNING
Case management/   Met with pt who is happy she has her medical records.   Confirmed dc date again for Friday 3/1, and confirmed home health is all arranged.   Pt saw dr eliu colon yesterday and has follow up on 3/19 @ 1:15pm.   She has all dme at home.   So to provide transport home.

## 2019-02-27 NOTE — PROGRESS NOTES
Patient returned from appointment with Dr Levine. Wound vac is dc'd. Incision site is pink, approximated and appears to be healing well. Will continue to monitor.
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
## 2019-02-27 NOTE — CARE PLAN
Problem: Communication  Goal: The ability to communicate needs accurately and effectively will improve  Outcome: MET Date Met: 02/27/19  Pt is able to effectively communicate her needs to staff.    Problem: Safety  Goal: Will remain free from injury  Outcome: MET Date Met: 02/27/19  Pt uses call light consistently for staff assistance. Pt has good safety awareness, no impulsivity observed.    Problem: Infection  Goal: Will remain free from infection  Outcome: MET Date Met: 02/27/19  No s/s of infection present    Problem: Bowel/Gastric:  Goal: Normal bowel function is maintained or improved  Outcome: MET Date Met: 02/27/19  Pt is continent of bowel and reports having BMs at least every other day. Last BM 2/25 per pt.    Problem: Respiratory:  Goal: Respiratory status will improve  Outcome: MET Date Met: 02/27/19  Pt is on room air and respirations are WNL.

## 2019-02-28 PROCEDURE — 97530 THERAPEUTIC ACTIVITIES: CPT

## 2019-02-28 PROCEDURE — 770010 HCHG ROOM/CARE - REHAB SEMI PRIVAT*

## 2019-02-28 PROCEDURE — 97110 THERAPEUTIC EXERCISES: CPT

## 2019-02-28 PROCEDURE — A9270 NON-COVERED ITEM OR SERVICE: HCPCS | Performed by: PHYSICAL MEDICINE & REHABILITATION

## 2019-02-28 PROCEDURE — 99233 SBSQ HOSP IP/OBS HIGH 50: CPT | Performed by: PHYSICAL MEDICINE & REHABILITATION

## 2019-02-28 PROCEDURE — 700102 HCHG RX REV CODE 250 W/ 637 OVERRIDE(OP): Performed by: PHYSICAL MEDICINE & REHABILITATION

## 2019-02-28 PROCEDURE — 97116 GAIT TRAINING THERAPY: CPT

## 2019-02-28 PROCEDURE — 700112 HCHG RX REV CODE 229: Performed by: PHYSICAL MEDICINE & REHABILITATION

## 2019-02-28 RX ORDER — FERROUS SULFATE 325(65) MG
325 TABLET ORAL
Qty: 30 TAB | Refills: 1 | Status: SHIPPED | OUTPATIENT
Start: 2019-02-28 | End: 2019-03-01

## 2019-02-28 RX ORDER — METHOCARBAMOL 500 MG/1
1000 TABLET, FILM COATED ORAL 4 TIMES DAILY
Qty: 120 TAB | Refills: 0 | Status: SHIPPED | OUTPATIENT
Start: 2019-02-28 | End: 2019-03-01

## 2019-02-28 RX ORDER — GABAPENTIN 300 MG/1
300 CAPSULE ORAL 3 TIMES DAILY
Qty: 90 CAP | Refills: 5 | Status: SHIPPED | OUTPATIENT
Start: 2019-02-28 | End: 2019-03-01

## 2019-02-28 RX ORDER — OXYCODONE HYDROCHLORIDE 10 MG/1
10 TABLET ORAL EVERY 8 HOURS PRN
Qty: 42 TAB | Refills: 0 | Status: SHIPPED | OUTPATIENT
Start: 2019-02-28 | End: 2019-03-14

## 2019-02-28 RX ORDER — ASPIRIN 81 MG/1
81 TABLET ORAL 2 TIMES DAILY
Qty: 60 TAB | Refills: 0 | Status: SHIPPED | OUTPATIENT
Start: 2019-02-28 | End: 2019-03-01

## 2019-02-28 RX ORDER — MORPHINE SULFATE 30 MG/1
30 TABLET, FILM COATED, EXTENDED RELEASE ORAL EVERY 12 HOURS
Qty: 28 TAB | Refills: 0 | Status: SHIPPED | OUTPATIENT
Start: 2019-02-28 | End: 2019-03-14

## 2019-02-28 RX ADMIN — ASPIRIN 81 MG: 81 TABLET, COATED ORAL at 08:33

## 2019-02-28 RX ADMIN — GABAPENTIN 300 MG: 300 CAPSULE ORAL at 08:34

## 2019-02-28 RX ADMIN — METHOCARBAMOL TABLETS 1000 MG: 500 TABLET, COATED ORAL at 21:37

## 2019-02-28 RX ADMIN — VITAMIN D, TAB 1000IU (100/BT) 2000 UNITS: 25 TAB at 08:33

## 2019-02-28 RX ADMIN — MORPHINE SULFATE 30 MG: 30 TABLET, EXTENDED RELEASE ORAL at 08:34

## 2019-02-28 RX ADMIN — OXYCODONE HYDROCHLORIDE 10 MG: 10 TABLET ORAL at 13:19

## 2019-02-28 RX ADMIN — CYANOCOBALAMIN TAB 1000 MCG 1000 MCG: 1000 TAB at 08:34

## 2019-02-28 RX ADMIN — METHOCARBAMOL TABLETS 1000 MG: 500 TABLET, COATED ORAL at 13:19

## 2019-02-28 RX ADMIN — OXYCODONE HYDROCHLORIDE 10 MG: 10 TABLET ORAL at 04:49

## 2019-02-28 RX ADMIN — MORPHINE SULFATE 30 MG: 30 TABLET, EXTENDED RELEASE ORAL at 21:34

## 2019-02-28 RX ADMIN — OXYCODONE HYDROCHLORIDE 10 MG: 10 TABLET ORAL at 17:22

## 2019-02-28 RX ADMIN — FERROUS SULFATE TAB 325 MG (65 MG ELEMENTAL FE) 325 MG: 325 (65 FE) TAB at 17:22

## 2019-02-28 RX ADMIN — DOCUSATE SODIUM 100 MG: 100 CAPSULE, LIQUID FILLED ORAL at 08:34

## 2019-02-28 RX ADMIN — FERROUS SULFATE TAB 325 MG (65 MG ELEMENTAL FE) 325 MG: 325 (65 FE) TAB at 08:34

## 2019-02-28 RX ADMIN — METHOCARBAMOL TABLETS 1000 MG: 500 TABLET, COATED ORAL at 17:22

## 2019-02-28 RX ADMIN — ACETAMINOPHEN 1000 MG: 500 TABLET, FILM COATED ORAL at 21:33

## 2019-02-28 RX ADMIN — OXYCODONE HYDROCHLORIDE AND ACETAMINOPHEN 500 MG: 500 TABLET ORAL at 08:34

## 2019-02-28 RX ADMIN — ACETAMINOPHEN 1000 MG: 500 TABLET, FILM COATED ORAL at 08:34

## 2019-02-28 RX ADMIN — ASPIRIN 81 MG: 81 TABLET, COATED ORAL at 21:36

## 2019-02-28 RX ADMIN — ACETAMINOPHEN 1000 MG: 500 TABLET, FILM COATED ORAL at 14:32

## 2019-02-28 RX ADMIN — GABAPENTIN 300 MG: 300 CAPSULE ORAL at 14:32

## 2019-02-28 RX ADMIN — METHOCARBAMOL TABLETS 1000 MG: 500 TABLET, COATED ORAL at 08:33

## 2019-02-28 RX ADMIN — DOCUSATE SODIUM 100 MG: 100 CAPSULE, LIQUID FILLED ORAL at 21:36

## 2019-02-28 RX ADMIN — GABAPENTIN 300 MG: 300 CAPSULE ORAL at 21:36

## 2019-02-28 NOTE — CARE PLAN
Problem: Skin Integrity  Goal: Risk for impaired skin integrity will decrease  Outcome: PROGRESSING AS EXPECTED      Problem: Pain Management  Goal: Pain level will decrease to patient's comfort goal  Outcome: PROGRESSING AS EXPECTED  Focused the day on pain management and using nonpharmacologic interventions to help manage pain in conjunction with scheduled morphine and prn víctor

## 2019-02-28 NOTE — DISCHARGE PLANNING
Case management Summary:   Met with patient prior to discharge.   Reviewed all follow up appointments made w/ PCP and Dr. Levine.   Referral made to [facility],  and they are have accepted referral and are ready to follow.    PT has fww,wc, tub transfer bench @ home.   Pharmacy=[facility]  SO will provide transportation home for pt. He plans on picking pt up after 5pm when he gets off work.       During hospitalization, I have provided support and education and have been available for questions and information during hours of operation, communicated with therapy team and MD along with providing links/resources  to outside services.    Patient verbalizes agreement with all plans and has an understanding of the next steps within the post acute services.     Individualized Goals:   1. Case Management will confirm level of support for home.  2. Will follow for restart of home health.    Outcome:   1. Met.  Pt lives alone; SO is able to assist w/ transition home.   2. Met.  Referral made to [facility]

## 2019-02-28 NOTE — CARE PLAN
Problem: Skin Integrity  Goal: Risk for impaired skin integrity will decrease  Outcome: PROGRESSING AS EXPECTED  Sacral dressing change q72hrs, healing well    Problem: Mobility  Goal: Risk for activity intolerance will decrease  Outcome: PROGRESSING AS EXPECTED  Pt self limiting in not attempting to transfer to toilet and just wants to use a urinal in bed and is insisting that her boyfriend will be helping her when she goes home

## 2019-02-28 NOTE — CARE PLAN
Problem: Bowel/Gastric:  Goal: Will not experience complications related to bowel motility  Outcome: PROGRESSING AS EXPECTED  Pt is continent of bowel and reports last BM 2/27/19.    Problem: Pain Management  Goal: Pain level will decrease to patient's comfort goal  Outcome: PROGRESSING SLOWER THAN EXPECTED  Pt receives scheduled MS Contin + Robaxin + Tylenol + Gabapentin and PRN Roxicodone at [facility] for lower back and hip pain that is usually 8-9/10 in intensity regardless of intervention. Pt also uses cold packs and is repositioned in bed for comfort.    Problem: Urinary Elimination:  Goal: Ability to reestablish a normal urinary elimination pattern will improve  Outcome: NOT MET  Pt insists on using female urinal in bed. Staff tried explaining to pt that she is going home in a couple days (Friday) and she needs to start using the toilet at nighttime as this is what she will be going home to. Pt explains that her boyfriend will be assisting her with using the urinal when they go home and that he has been trained to do this already. Pt also says she wont use the bathroom after her roommate uses it because it smells. Staff sprayed air freshener in restroom then offered again to take pt to restroom. Pt still insists to get into bed from wheelchair to use female urinal. Staff assist x 2 to accomplish this.
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
## 2019-03-01 VITALS
RESPIRATION RATE: 18 BRPM | BODY MASS INDEX: 32.97 KG/M2 | OXYGEN SATURATION: 96 % | HEIGHT: 63 IN | DIASTOLIC BLOOD PRESSURE: 56 MMHG | HEART RATE: 71 BPM | SYSTOLIC BLOOD PRESSURE: 104 MMHG | TEMPERATURE: 98.5 F | WEIGHT: 186.1 LBS

## 2019-03-01 PROCEDURE — 700102 HCHG RX REV CODE 250 W/ 637 OVERRIDE(OP): Performed by: PHYSICAL MEDICINE & REHABILITATION

## 2019-03-01 PROCEDURE — A9270 NON-COVERED ITEM OR SERVICE: HCPCS | Performed by: PHYSICAL MEDICINE & REHABILITATION

## 2019-03-01 PROCEDURE — 97530 THERAPEUTIC ACTIVITIES: CPT

## 2019-03-01 PROCEDURE — 99239 HOSP IP/OBS DSCHRG MGMT >30: CPT | Performed by: PHYSICAL MEDICINE & REHABILITATION

## 2019-03-01 PROCEDURE — 97110 THERAPEUTIC EXERCISES: CPT

## 2019-03-01 PROCEDURE — 97116 GAIT TRAINING THERAPY: CPT

## 2019-03-01 PROCEDURE — 700112 HCHG RX REV CODE 229: Performed by: PHYSICAL MEDICINE & REHABILITATION

## 2019-03-01 RX ORDER — FERROUS SULFATE 325(65) MG
325 TABLET ORAL
Qty: 30 TAB | Refills: 1 | Status: SHIPPED | OUTPATIENT
Start: 2019-03-01

## 2019-03-01 RX ORDER — METHOCARBAMOL 500 MG/1
1000 TABLET, FILM COATED ORAL 4 TIMES DAILY
Qty: 120 TAB | Refills: 0 | Status: SHIPPED | OUTPATIENT
Start: 2019-03-01 | End: 2019-03-29 | Stop reason: SDUPTHER

## 2019-03-01 RX ORDER — ASPIRIN 81 MG/1
81 TABLET ORAL 2 TIMES DAILY
Qty: 60 TAB | Refills: 0 | Status: SHIPPED | OUTPATIENT
Start: 2019-03-01 | End: 2020-01-16

## 2019-03-01 RX ORDER — GABAPENTIN 300 MG/1
300 CAPSULE ORAL 3 TIMES DAILY
Qty: 90 CAP | Refills: 5 | Status: SHIPPED | OUTPATIENT
Start: 2019-03-01 | End: 2019-09-11

## 2019-03-01 RX ADMIN — OXYCODONE HYDROCHLORIDE 10 MG: 10 TABLET ORAL at 12:55

## 2019-03-01 RX ADMIN — ACETAMINOPHEN 1000 MG: 500 TABLET, FILM COATED ORAL at 08:18

## 2019-03-01 RX ADMIN — CYANOCOBALAMIN TAB 1000 MCG 1000 MCG: 1000 TAB at 08:19

## 2019-03-01 RX ADMIN — FERROUS SULFATE TAB 325 MG (65 MG ELEMENTAL FE) 325 MG: 325 (65 FE) TAB at 08:19

## 2019-03-01 RX ADMIN — FERROUS SULFATE TAB 325 MG (65 MG ELEMENTAL FE) 325 MG: 325 (65 FE) TAB at 16:55

## 2019-03-01 RX ADMIN — DOCUSATE SODIUM 100 MG: 100 CAPSULE, LIQUID FILLED ORAL at 08:19

## 2019-03-01 RX ADMIN — OXYCODONE HYDROCHLORIDE 10 MG: 10 TABLET ORAL at 16:55

## 2019-03-01 RX ADMIN — METHOCARBAMOL TABLETS 1000 MG: 500 TABLET, COATED ORAL at 08:19

## 2019-03-01 RX ADMIN — ACETAMINOPHEN 1000 MG: 500 TABLET, FILM COATED ORAL at 14:05

## 2019-03-01 RX ADMIN — METHOCARBAMOL TABLETS 1000 MG: 500 TABLET, COATED ORAL at 16:55

## 2019-03-01 RX ADMIN — GABAPENTIN 300 MG: 300 CAPSULE ORAL at 08:19

## 2019-03-01 RX ADMIN — OXYCODONE HYDROCHLORIDE 10 MG: 10 TABLET ORAL at 05:59

## 2019-03-01 RX ADMIN — MORPHINE SULFATE 30 MG: 30 TABLET, EXTENDED RELEASE ORAL at 08:19

## 2019-03-01 RX ADMIN — ASPIRIN 81 MG: 81 TABLET, COATED ORAL at 08:19

## 2019-03-01 RX ADMIN — OXYCODONE HYDROCHLORIDE AND ACETAMINOPHEN 500 MG: 500 TABLET ORAL at 08:19

## 2019-03-01 RX ADMIN — GABAPENTIN 300 MG: 300 CAPSULE ORAL at 14:05

## 2019-03-01 RX ADMIN — VITAMIN D, TAB 1000IU (100/BT) 2000 UNITS: 25 TAB at 08:19

## 2019-03-01 RX ADMIN — METHOCARBAMOL TABLETS 1000 MG: 500 TABLET, COATED ORAL at 12:55

## 2019-03-01 ASSESSMENT — ACTIVITIES OF DAILY LIVING (ADL)
TOILET_TRANSFER_LEVEL_OF_ASSIST: ABLE TO COMPLETE TOILET TRANSFER WITHOUT ASSIST
TOILETING_LEVEL_OF_ASSIST: ABLE TO COMPLETE TOILETING WITHOUT ASSIST
SHOWER_TRANSFER_LEVEL_OF_ASSIST: REQUIRES SUPERVISION WITH SHOWER TRANSFER

## 2019-03-01 ASSESSMENT — GAIT ASSESSMENTS
ASSISTIVE DEVICE: FRONT WHEEL WALKER
GAIT LEVEL OF ASSIST: CONTACT GUARD ASSIST
DISTANCE (FEET): 80
DEVIATION: ANTALGIC;BRADYKINETIC

## 2019-03-01 NOTE — CARE PLAN
Problem: Venous Thromboembolism (VTW)/Deep Vein Thrombosis (DVT) Prevention:  Goal: Patient will participate in Venous Thrombosis (VTE)/Deep Vein Thrombosis (DVT)Prevention Measures  SCD's in place at HS. No s/s of discomfort/distress.    Problem: Urinary Elimination:  Goal: Ability to reestablish a normal urinary elimination pattern will improve  Uses urinal at night after going to bed. Tolerates well. UOP documented.

## 2019-03-01 NOTE — CARE PLAN
Problem: Mobility  Goal: Risk for activity intolerance will decrease  Outcome: PROGRESSING AS EXPECTED  Pt still refusing to get out of bed to transfer to toilet when needing to void. Pt wants female urinal in bed    Problem: Pain Management  Goal: Pain level will decrease to patient's comfort goal  Outcome: PROGRESSING AS EXPECTED  Pt medicated for pain, educated about side effects of narcotics
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
## 2019-03-01 NOTE — CARE PLAN
Problem: IADL's  Goal: STG-Within one week, patient will access kitchen area  1) Individualized Goal: at a level of CGA.   2) Interventions:  OT Self Care/ADL, OT Community Reintegration, OT Manual Ther Technique, OT Neuro Re-Ed/Balance, OT Therapeutic Activity and OT Therapeutic Exercise     Outcome: NOT MET  Unable to address on day of d/c per patient request due to staff scheduling issues.       Problem: OT Long Term Goals  Goal: LTG-By discharge, patient will complete basic self care tasks  1) Individualized Goal:  At a level of Min A.   2) Interventions:  OT Self Care/ADL, OT Neuro Re-Ed/Balance, OT Therapeutic Activity, OT Evaluation and OT Therapeutic Exercise     Outcome: MET Date Met: 03/01/19   Completing tasks with Min assist and mod encouragement to perform tasks with out first demanding assist     Goal: LTG-By discharge, patient will perform bathroom transfers  1) Individualized Goal:  At a level of Mod I.   2) Interventions:  OT E Stim Attended, OT Group Therapy, OT Self Care/ADL, OT Community Reintegration, OT Neuro Re-Ed/Balance, OT Therapeutic Activity, OT Evaluation and OT Therapeutic Exercise     Outcome: MET Date Met: 03/01/19   Completes Toilet and shower transfer  Mod I with FWW and grab bar    Goal: LTG-By discharge, patient will complete basic home management  1) Individualized Goal:  At a level of Min A.   2) Interventions:  OT Self Care/ADL, OT Community Reintegration, OT Manual Ther Technique, OT Neuro Re-Ed/Balance, and OT Therapeutic Activity.        Outcome: NOT MET  Unable to address on day of d/c  Per patient request due to staffing issues

## 2019-03-01 NOTE — DISCHARGE PLANNING
RE;  disability paperwork that was initiated in Nov 2018.  Discussed w/ Dr. Harper yesterday.    I informed pt neurosurgery would need to complete form as they initiated it.    PT requested I fax it to [facility] Attn:  Isaiah MEDINA for Dr. Mendoza which I did today. Original paperwork returned to patient.

## 2019-03-01 NOTE — DISCHARGE PLANNING
Case management Summary:   I met with pt today.  I gave her back paperwork from her [facility] [employer] date 1/8/2019. I have advised her if this form needs to be completed, her MD who completed her neck surgery will need to complete.    Reviewed all follow up appointments to include PCP and Ortho follow up.   Referral made to [facility] and they are have accepted referral and are ready to follow.    NO dme ordered as pt has a fww, wc, and at tub/transfer bench @ home.  SO will provide transportation home after 5pm today.    Pt uses [facility] @ [employer].     During hospitalization, I have provided support and education and have been available for questions and information during hours of operation, communicated with therapy team and MD along with providing links/resources  to outside services.    Patient verbalizes agreement with all plans and has an understanding of the next steps within the post acute services.     Individualized Goals:   1. Case Management will confirm level of support for home.  2. Will follow for restart of home health.    Outcome:   1.  Met.  PT will return home where she lives alone; SO to assist w /transition.   2.  Met.  [facility] has accepted referral and will follow.     No further intervention.

## 2019-03-02 NOTE — PROGRESS NOTES
Pt discharged to family member for transport home in family vehicle. All belongings taken w/pt. A&O x 4, able to stand, pivot to transfer from w/c to car. Tolerated well.

## 2019-03-02 NOTE — CARE PLAN
Problem: Mobility  Goal: STG-Within one week, patient will ambulate household distance  1) Individualized goal:  Amb >50 ft x 4 indoors with FWW SBA  2) Interventions:  PT Group Therapy, PT E Stim Attended, PT Gait Training, PT Therapeutic Exercises, PT Neuro Re-Ed/Balance, PT Aquatic Therapy, PT Therapeutic Activity, PT Manual Therapy and PT Evaluation     Outcome: MET Date Met: 03/01/19      Problem: Mobility Transfers  Goal: STG-Within one week, patient will transfer bed to chair  1) Individualized goal:  Patient will transfer SBA with FWW consistently for STS/SPT, bed mob SBA  2) Interventions:  PT Group Therapy, PT E Stim Attended, PT Gait Training, PT Therapeutic Exercises, PT Neuro Re-Ed/Balance, PT Aquatic Therapy, PT Therapeutic Activity and PT Manual Therapy     Outcome: DISCHARGED-GOAL NOT MET Date Met: 03/01/19      Problem: PT-Long Term Goals  Goal: LTG-By discharge, patient will ambulate  1) Individualized goal: Gait fww spv 200 ft at discharge  2) Interventions:  PT Gait Training, PT Therapeutic Exercises and PT Therapeutic Activity     Outcome: DISCHARGED-GOAL NOT MET Date Met: 03/01/19    Goal: LTG-By discharge, patient will transfer one surface to another  1) Individualized goal: Transfer one surface to another fww supervision at discharge  2) Interventions:  PT Gait Training, PT Therapeutic Exercises and PT Therapeutic Activity     Outcome: MET Date Met: 03/01/19    Goal: LTG-By discharge, patient will transfer in/out of a car  1) Individualized goal: Car transfer fww supervision at discharge  2) Interventions:  PT Gait Training, PT Therapeutic Exercises and PT Therapeutic Activity     Outcome: DISCHARGED-GOAL NOT MET Date Met: 03/01/19

## 2019-03-02 NOTE — DISCHARGE INSTRUCTIONS
[facility] NURSING DISCHARGE INSTRUCTIONS    Blood Pressure:   Weight: 84.4 kg (186 lb 1.6 oz)  Nursing recommendations for Karine Ovalle at time of discharge are as follows:  Client and Family Member verbalized understanding of all discharge instructions and prescriptions.     Review all your home medications and newly ordered medications with your doctor and/or pharmacist. Follow medication instructions as directed by your doctor and/or pharmacist.    Pain Management:   Discharge Pain Medication Instructions:  Comfort Goal: Perform Activity, Comfort at Rest, Sleep Comfortably  Notify your primary care provider if pain is unrelieved with these measures, if the pain is new, or increased in intensity.    Discharge Skin Characteristics:    Discharge Skin Exam:  Alternated pressure while I bed and in chair to prevent pressure injury every 15min for at least 30 seconds  Skin / Wound Care Instructions: Please contact your primary care physician for any change in skin integrity.     If You Have Surgical Incisions / Wounds:  Monitor surgical site(s) for signs of increased swelling, redness or symptoms of drainage from the site or fever as this could indicate signs and symptoms of infection. If these symptoms are noted, notifiy your primary care provider.      Discharge Safety Instructions: Should Have ADULT SUPERVISION     Discharge Safety Concerns: Balance Problems (Dizziness, Light Headedness), Unsteady Gait, Weakness, History Of Falls  The interdisciplinary team has made recommendation that you should have adult supervision in the house due to balance problem, history of falls, weakness and unsteady gait  Anti-embolic stockings are required during the day and off at night to increase circulation to the lower extremities.      Fall Prevention in Hospitals, Adult  WHAT ARE SOME SAFETY TIPS FOR PREVENTING FALLS?  If you or a loved one has to stay in the hospital, talk with the health care  providers about the risk of falling. Find out which medicines or treatments can cause dizziness or affect balance. Make a plan with the health care providers to prevent falls. The plan may include these points:  · Ask for help moving around, especially after surgery or when feeling unwell.  · Have support available when getting up and moving around.  · Wear nonskid footwear.  · Use the safety straps on wheelchairs.  · Ask for help to get objects that are out of reach.  · Wear eyeglasses.  · Remove all clutter from the floor and the sides of the bed.  · Keep equipment and wires securely out of the way.  · Keep the bed locked in the low position.  · Keep the side rails up on the bed.  · Keep the nurse call button within reach.  · Keep the door open when no one else is in the room.  · Have someone stay in the hospital with you or your loved one.  · Ask for a bed alarm if you are not able to stay with your loved one who is at risk for getting up without help.  · Ask if sleeping pills or other medicines that alter mental status are necessary.  WHAT INCREASES THE RISK FOR FALLS?  Certain conditions and treatments may increase a patient's risk for falls in a hospital. These include:  · Being in an unfamiliar environment.  · Being on bed rest.  · Having surgery.  · Taking certain medicines, such as sleeping pills.  · Having tubes in place, such as IV lines or catheters.  Additional risk factors for falls in a hospital include:  · Having vision problems.  · Having a change in thinking, feeling, or behavior (altered mental status).  · Having trouble with balance.  · Needing to use the toilet frequently.  · Having fallen in the past three months.  · Having low blood pressure when standing up quickly (orthostatic hypotension).  WHAT DOES THE HOSPITAL STAFF DO TO HELP PREVENT ME OR MY LOVED ONE FROM FALLING?  Hospitals have systems in place to prevent falls and accidents. Talk with the hospital staff about:  · Doing an  assessment to discuss fall risks and create a personalized plan to prevent falls.  · Checking in regularly to see if help is needed for moving around and to assess any changes in fall risk.  · Knowing where the nurse call button is and how to use it. Use this to call a nursing care provider any time.  · Keeping personal items within reach. This includes eyeglasses, phones, and other electronic devices.  · Following general safety guidelines when moving around.  · Keeping the area around the bed free from clutter.  · Removing unnecessary equipment or tubes to reduce the risk of tripping.  · Using safety equipment, such as:  ¨ Walkers, crutches, and other walking devices for support.  ¨ Safety rails on the bed.  ¨ Safety straps in the bed.  ¨ A bed that can be lowered and locked to prevent movement.  ¨ Handrails in the bathroom.  ¨ Nonskid socks and shoes.  ¨ Locking mechanisms to secure equipment in place.  ¨ Lifting and transfer equipment.  WHAT CAN I DO TO HELP PREVENT A FALL?  · Talk with health care providers about fall prevention.  · Have a personalized fall prevention plan in place.  · Do not try to move around if you feel off balance or ill.  · Change position slowly.  · Sit on the side of the bed before standing up.  · Sit down and call for help if you feel dizzy or unsteady when standing.  · Keep the hospital room clear of clutter.  WHEN SHOULD I ASK FOR HELP?  Ask for help whenever you:  · Are not sure if you are able to move around safely.  · Feel dizzy or unsteady.  · Are not comfortable helping your loved one move around or use the bathroom.  If you or a loved one falls, tell the hospital staff. This is important.  This information is not intended to replace advice given to you by your health care provider. Make sure you discuss any questions you have with your health care provider.  Document Released: 12/15/2001 Document Revised: 05/16/2017 Document Reviewed: 09/29/2016  [employer] Interactive Patient  Education © 2017 [employer]      Discharge Diet: Regular     Discharge Liquids: Thin  Discharge Bowel Function: Continent  Please contact your primary care physician for any changes in bowel habits.  Discharge Bowel Program:    Discharge Bladder Function: Continent  Discharge Urinary Devices: None      Nursing Discharge Plan:   Influenza Vaccine Indication: Patient Refuses, Not indicated: Previously immunized this influenza season and > 8 years of age    Suicidal Feelings: How to Help Yourself  Suicide is the taking of one's own life. If you feel as though life is getting too tough to handle and are thinking about suicide, get help right away. To get help:  · Call your local emergency services (911 in the [place]  · Call a suicide hotline to speak with a trained counselor who understands how you are feeling. The following is a list of suicide hotlines in the [place]. For a list of hotlines in [place], visit www.suicide.org/hotlines/international/ylpebn-wnhhiht-rcypknna.html.  ¨ 1-253-831-TALK (1-687.817.3213).  ¨ 6-343-QKSCDNS (1-859.518.3796).  ¨ 1-247.329.7201. This is a hotline for Lao speakers.  ¨ 7-690-699-4TTY (1-518.790.5660). This is a hotline for TTY users.  ¨ 6-788-6-U-KERRY (1-239.529.7327). This is a hotline for lesbian, young, bisexual, transgender, or questioning youth.  · Contact a crisis center or a local suicide prevention center. To find a crisis center or suicide prevention center:  ¨ Call your local hospital, clinic, community service organization, mental health center, social service provider, or health department. Ask for assistance in connecting to a crisis center.  ¨ Visit www.suicidepreventionlifeline.org/getinvolved/ for a list of crisis centers in the [place], or visit www.suicideprevention.ca/kgmgsvoo-jzjpq-wyeecvj/find-a-crisis-centre for a list of centers in [place].  · Visit the following websites:  ¨ National Suicide Prevention Lifeline:  www.suicidepreventionlifeline.org  ¨ Hopeline: www.hopeline.com  ¨ American Foundation for Suicide Prevention: www.afsp.org  ¨ The [place] Project (for lesbian, young, bisexual, transgender, or questioning youth): www.thetrevorproject.org  How can I help myself feel better?  · Promise yourself that you will not do anything drastic when you have suicidal feelings. Remember, there is hope. Many people have gotten through suicidal thoughts and feelings, and you will, too. You may have gotten through them before, and this proves that you can get through them again.  · Let family, friends, teachers, or counselors know how you are feeling. Try not to isolate yourself from those who care about you. Remember, they will want to help you. Talk with someone every day, even if you do not feel sociable. Face-to-face conversation is best.  · Call a mental health professional and see one regularly.  · Visit your primary health care provider every year.  · Eat a well-balanced diet, and space your meals so you eat regularly.  · Get plenty of rest.  · Avoid alcohol and drugs, and remove them from your home. They will only make you feel worse.  · If you are thinking of taking a lot of medicine, give your medicine to someone who can give it to you one day at a time. If you are on antidepressants and are concerned you will overdose, let your health care provider know so he or she can give you safer medicines. Ask your mental health professional about the possible side effects of any medicines you are taking.  · Remove weapons, poisons, knives, and anything else that could harm you from your home.  · Try to stick to routines. Follow a schedule every day. Put self-care on your schedule.  · Make a list of realistic goals, and cross them off when you achieve them. Accomplishments give a sense of worth.  · Wait until you are feeling better before doing the things you find difficult or unpleasant.  · Exercise if you are able. You will feel  better if you exercise for even a half hour each day.  · Go out in the sun or into nature. This will help you recover from depression faster. If you have a favorite place to walk, go there.  · Do the things that have always given you pleasure. Play your favorite music, read a good book, paint a picture, play your favorite instrument, or do anything else that takes your mind off your depression if it is safe to do.  · Keep your living space well lit.  · When you are feeling well, write yourself a letter about tips and support that you can read when you are not feeling well.  · Remember that life’s difficulties can be sorted out with help. Conditions can be treated. You can work on thoughts and strategies that serve you well.  This information is not intended to replace advice given to you by your health care provider. Make sure you discuss any questions you have with your health care provider.  Document Released: 06/23/2004 Document Revised: 08/16/2017 Document Reviewed: 04/14/2015  [employer] Interactive Patient Education © 2017 [employer]      Case Management Discharge Instructions:   Discharge Location: Home with Home Health  Agency Name/Address/Phone: [facility] .  They will call you to schedule home visits with the following:     Home Health: Registered Nurse, Physical Therapist, Occupational Therapist  Outpatient Services:    DME Provider/Phone: None ordered; pt has a front wheeeled walker, single point cane, wheelchair, and tub transfer bench plus grab bars in bathtub.   Medical Equipment Ordered:    Prescription Faxed to:        Discharge Medication Instructions:  Below are the medications your physician expects you to take upon discharge:      Occupational Therapy Discharge Instructions for Karine Ovalle    3/1/2019    Level of Assist Required for Eating: Able to Complete Eating without Assist  Level of Assist Required for Grooming: Able to Complete Grooming without Assist  Level of  Assist Required for Dressing: Requires Physical Assist with Dressing  Level of Assist Required for Toileting: Able to Complete Toileting without Assist  Level of Assist Required for Toilet Transfer: Able to Complete Toilet Transfer without Assist  Equipment for Toilet Transfer: Commode over Toilet, Other (FWW)  Level of Assist Required for Bathing: Requires Supervision with Bathing  Equipment for Bathing: Tub Transfer Bench, Hand Held Shower Head, Long Handled Sponge  Level of Assist Required for Shower Transfer: Requires Supervision with Shower Transfer Have some one with you the first few times getting in /out of the  Tub using the bench      Level of Assist Required for Home Mgmt: Requires Physical Assist with Home Management  Level of Assist Required for Meal Prep: Requires Physical Assist with Meal Preparation  Driving: Please Contact Physician Prior to Driving    Best of gabby Milton  I enjoyed working with you!!    Isaiah WHITING    Physical Therapy Discharge Instructions for Karine Ovalle    3/1/2019    Weight Bearing Status - Patient Should: Bear Toe-Touch Weight Left Leg  Level of Assist Required for Ambulation: Supervision on Flat Surfaces, Should Not Attempt Curbs at This Time, Should Not Attempt Stairs at This Time  Distance Patient May Ambulate: as tolerated up to ~ 100 ft  Device Recommended for Ambulation: Front-Wheeled Walker  Level of Assist Required to Propel Wheelchair: Supervision  Level of Assist Required for Transfers: Supervision  Device Recommended for Transfers: Front-Wheeled Walker

## 2019-03-02 NOTE — CARE PLAN
Problem: Venous Thromboembolism (VTW)/Deep Vein Thrombosis (DVT) Prevention:  Goal: Patient will participate in Venous Thrombosis (VTE)/Deep Vein Thrombosis (DVT)Prevention Measures  Outcome: MET Date Met: 03/01/19

## 2019-03-02 NOTE — DISCHARGE PLANNING
Met w/ pt again.   Pt reports Dr. Mendoza's MA advised pt to have Ortho MD complete follow up.   I faxed disability report to [employer] @ [phone].  No further intervention.

## 2019-03-04 ENCOUNTER — APPOINTMENT (OUTPATIENT)
Dept: MEDICAL GROUP | Age: 54
End: 2019-03-04
Payer: COMMERCIAL

## 2019-03-04 NOTE — PROGRESS NOTES
DATE OF SERVICE:  02/26/2019    The patient was seen for a followup visit.  The patient's mood remains upbeat   and positive.  The patient said she is doing well in her therapies.  She says   she is improving in strength and endurance.  The patient says she is getting   very good with her transfers.  The patient talked about some of her plans for   discharge.       ____________________________________     MARIBEL FINK, PHD    JENIFFER / RICHARD    DD:  03/03/2019 14:59:16  DT:  03/03/2019 16:42:39    D#:  [id number]  Job#:  [id number]

## 2019-03-04 NOTE — PROGRESS NOTES
DATE OF SERVICE:  02/27/2019    The patient continues to do well at followup.  Her mood is upbeat.  She talked   about what she hopes to accomplish while in her rehab.  Her short and   long-term goals were discussed.  The patient also had some questions about the   topic she would like to raise with her neurosurgeon at her next followup   visit.       ____________________________________     MARIBEL FINK, PHD    JENIFFER / RICHARD    DD:  03/03/2019 15:27:05  DT:  03/03/2019 16:52:14    D#:  [id number]  Job#:  [id number]

## 2019-03-04 NOTE — PROGRESS NOTES
DATE OF SERVICE:  02/28/2019    No significant changes noted in the patient's condition since she was last   seen.  She continues to do well.  Her mood is upbeat and positive.  Patient's   confidence is good for being able to successfully return home.       ____________________________________     MARIBEL FINK, PHD    JENIFFER / RICHARD    DD:  03/03/2019 15:46:13  DT:  03/03/2019 16:57:24    D#:  [id number]  Job#:  [id number]

## 2019-03-05 ENCOUNTER — HOSPITAL ENCOUNTER (OUTPATIENT)
Dept: RADIOLOGY | Facility: MEDICAL CENTER | Age: 54
End: 2019-03-05
Attending: PHYSICIAN ASSISTANT
Payer: COMMERCIAL

## 2019-03-05 DIAGNOSIS — M25.511 RIGHT SHOULDER PAIN, UNSPECIFIED CHRONICITY: ICD-10-CM

## 2019-03-05 DIAGNOSIS — M54.2 CERVICALGIA: ICD-10-CM

## 2019-03-05 PROCEDURE — 73030 X-RAY EXAM OF SHOULDER: CPT | Mod: RT

## 2019-03-05 PROCEDURE — 72040 X-RAY EXAM NECK SPINE 2-3 VW: CPT

## 2019-03-06 NOTE — ED NOTES
[address] [facility] @ [facility] VISIT NOTE    1320 Patient arrives for Remicade [id number] without acute problems. Please see connect care for complete assessment and education provided. Vital signs stable throughout and prior to discharge, Pt. Tolerated treatment well and discharged without incident. Patient/parent is aware of next [facility] appointment on 5/1/2019. Appointment card given to patient/parents. Medications Verified by Hung Hardwick RN & Kyler Aguilar via [employer]:  1. Remicade 200 mg    VITAL SIGNS   Patient Vitals for the past 12 hrs:   Temp Pulse Resp BP SpO2   03/06/19 1629 97.9 °F (36.6 °C) 84 16 101/66 --   03/06/19 1553 -- 112 -- 91/53 --   03/06/19 1523 -- 88 -- 95/59 --   03/06/19 1453 -- 85 -- 100/59 --   03/06/19 1438 -- 94 -- 95/65 --   03/06/19 1423 -- 84 -- 97/59 --   03/06/19 1321 97.4 °F (36.3 °C) 98 16 103/68 99 %         LAB WORK Lab results pending, please see Connect Care for results. Recent Results (from the past 12 hour(s))   VITAMIN D, 25 HYDROXY    Collection Time: 03/06/19  1:24 PM   Result Value Ref Range    Vitamin D 25-Hydroxy 28.6 (L) 30 - 100 ng/mL   CBC WITH AUTOMATED DIFF    Collection Time: 03/06/19  1:24 PM   Result Value Ref Range    WBC 6.3 4.3 - 11.4 K/uL    RBC 4.85 3.90 - 4.95 M/uL    HGB 13.6 (H) 10.6 - 13.2 g/dL    HCT 40.9 (H) 32.4 - 39.5 %    MCV 84.3 75.9 - 87.6 FL    MCH 28.0 24.8 - 29.5 PG    MCHC 33.3 31.8 - 34.6 g/dL    RDW 13.2 12.2 - 14.4 %    PLATELET 92 (L) 162 - 367 K/uL    MPV 10.5 9.3 - 11.3 FL    NRBC 0.0 0  WBC    ABSOLUTE NRBC 0.00 (L) 0.03 - 0.15 K/uL    NEUTROPHILS 40 30 - 71 %    LYMPHOCYTES 46 17 - 58 %    MONOCYTES 9 4 - 11 %    EOSINOPHILS 5 (H) 0 - 4 %    BASOPHILS 1 0 - 1 %    IMMATURE GRANULOCYTES 0 0.0 - 0.3 %    ABS. NEUTROPHILS 2.5 1.6 - 7.9 K/UL    ABS. LYMPHOCYTES 2.9 1.2 - 4.3 K/UL    ABS. MONOCYTES 0.5 0.2 - 0.8 K/UL    ABS. EOSINOPHILS 0.3 0.0 - 0.5 K/UL    ABS. BASOPHILS 0.0 0.0 - 0.1 K/UL    ABS. Break RN:   Discharge instructions given to patient. Education provided on diagnosis, treatment, follow up, and prescriptions provided. Pt verbalized understanding. All questions answered. IV removed. Pt taken to vehicle in wheelchair by ER tech.    IMM. [place] Serum. 0.0 0.00 - 0.04 K/UL    DF AUTOMATED     C REACTIVE PROTEIN, QT    Collection Time: 03/06/19  1:24 PM   Result Value Ref Range    C-Reactive protein <0.29 0.00 - 2.49 mg/dL   METABOLIC PANEL, COMPREHENSIVE    Collection Time: 03/06/19  1:24 PM   Result Value Ref Range    Sodium 143 (H) 132 - 141 mmol/L    Potassium 3.6 3.5 - 5.1 mmol/L    Chloride 115 (H) 97 - 108 mmol/L    CO2 18 18 - 29 mmol/L    Anion gap 10 5 - 15 mmol/L    Glucose 75 54 - 117 mg/dL    BUN 9 6 - 20 MG/DL    Creatinine 0.34 0.30 - 0.90 MG/DL    BUN/Creatinine ratio 26 (H) 12 - 20      GFR est AA Cannot be calculated >60 ml/min/1.73m2    GFR est non-AA Cannot be calculated >60 ml/min/1.73m2    Calcium 7.4 (L) 8.8 - 10.8 MG/DL    Bilirubin, total 0.2 0.2 - 1.0 MG/DL    ALT (SGPT) 30 12 - 78 U/L    AST (SGOT) 32 10 - 40 U/L    Alk.  phosphatase 155 100 - 440 U/L    Protein, total 5.8 (L) 6.0 - 8.0 g/dL    Albumin 3.2 3.2 - 5.5 g/dL    Globulin 2.6 2.0 - 4.0 g/dL    A-G Ratio 1.2 1.1 - 2.2     FERRITIN    Collection Time: 03/06/19  2:04 PM   Result Value Ref Range    Ferritin 17 7 - 140 NG/ML

## 2019-03-07 ENCOUNTER — HOME CARE VISIT (OUTPATIENT)
Dept: HOME HEALTH SERVICES | Facility: HOME HEALTHCARE | Age: 54
End: 2019-03-07
Payer: COMMERCIAL

## 2019-03-07 VITALS
OXYGEN SATURATION: 94 % | BODY MASS INDEX: 34.98 KG/M2 | WEIGHT: 197.4 LBS | SYSTOLIC BLOOD PRESSURE: 122 MMHG | TEMPERATURE: 98 F | DIASTOLIC BLOOD PRESSURE: 78 MMHG | RESPIRATION RATE: 18 BRPM | HEIGHT: 63 IN | HEART RATE: 94 BPM

## 2019-03-07 PROCEDURE — G0493 RN CARE EA 15 MIN HH/HOSPICE: HCPCS

## 2019-03-07 PROCEDURE — 665001 SOC-HOME HEALTH

## 2019-03-07 ASSESSMENT — PATIENT HEALTH QUESTIONNAIRE - PHQ9
CLINICAL INTERPRETATION OF PHQ2 SCORE: 0
1. LITTLE INTEREST OR PLEASURE IN DOING THINGS: 00
2. FEELING DOWN, DEPRESSED, IRRITABLE, OR HOPELESS: 00

## 2019-03-07 ASSESSMENT — ACTIVITIES OF DAILY LIVING (ADL): OASIS_M1830: 03

## 2019-03-08 ENCOUNTER — HOME CARE VISIT (OUTPATIENT)
Dept: HOME HEALTH SERVICES | Facility: HOME HEALTHCARE | Age: 54
End: 2019-03-08
Payer: COMMERCIAL

## 2019-03-08 ENCOUNTER — TELEPHONE (OUTPATIENT)
Dept: HEALTH INFORMATION MANAGEMENT | Facility: OTHER | Age: 54
End: 2019-03-08

## 2019-03-08 VITALS
HEART RATE: 89 BPM | TEMPERATURE: 98.8 F | RESPIRATION RATE: 18 BRPM | DIASTOLIC BLOOD PRESSURE: 64 MMHG | SYSTOLIC BLOOD PRESSURE: 100 MMHG | OXYGEN SATURATION: 99 %

## 2019-03-08 PROCEDURE — G0151 HHCP-SERV OF PT,EA 15 MIN: HCPCS

## 2019-03-08 ASSESSMENT — ACTIVITIES OF DAILY LIVING (ADL)
ADLS_COMMENTS: <!--EPICS-->SEE OT EVAL<!--EPICE-->
IADLS_COMMENTS: <!--EPICS-->SEE OT EVAL<!--EPICE-->

## 2019-03-08 NOTE — TELEPHONE ENCOUNTER
Referral from [facility]. Med review completed. No clinically significant medication related issues noted.

## 2019-03-11 ENCOUNTER — HOME CARE VISIT (OUTPATIENT)
Dept: HOME HEALTH SERVICES | Facility: HOME HEALTHCARE | Age: 54
End: 2019-03-11
Payer: COMMERCIAL

## 2019-03-11 VITALS
SYSTOLIC BLOOD PRESSURE: 110 MMHG | HEART RATE: 91 BPM | RESPIRATION RATE: 16 BRPM | TEMPERATURE: 98.7 F | OXYGEN SATURATION: 99 % | DIASTOLIC BLOOD PRESSURE: 70 MMHG

## 2019-03-11 PROCEDURE — G0495 RN CARE TRAIN/EDU IN HH: HCPCS

## 2019-03-11 ASSESSMENT — ENCOUNTER SYMPTOMS
VOMITING: DENIES
NAUSEA: DENIES

## 2019-03-12 ENCOUNTER — HOME CARE VISIT (OUTPATIENT)
Dept: HOME HEALTH SERVICES | Facility: HOME HEALTHCARE | Age: 54
End: 2019-03-12
Payer: COMMERCIAL

## 2019-03-12 VITALS
DIASTOLIC BLOOD PRESSURE: 70 MMHG | OXYGEN SATURATION: 98 % | TEMPERATURE: 97.9 F | SYSTOLIC BLOOD PRESSURE: 100 MMHG | HEART RATE: 92 BPM | RESPIRATION RATE: 16 BRPM

## 2019-03-12 VITALS
RESPIRATION RATE: 16 BRPM | OXYGEN SATURATION: 98 % | HEART RATE: 92 BPM | SYSTOLIC BLOOD PRESSURE: 100 MMHG | TEMPERATURE: 97.9 F | DIASTOLIC BLOOD PRESSURE: 70 MMHG

## 2019-03-12 PROCEDURE — G0152 HHCP-SERV OF OT,EA 15 MIN: HCPCS

## 2019-03-12 PROCEDURE — G0151 HHCP-SERV OF PT,EA 15 MIN: HCPCS

## 2019-03-12 SDOH — ECONOMIC STABILITY: HOUSING INSECURITY: HOME SAFETY: PT HAS 3 SMALL DOGS THAT ARE FAIRLY WELL CONTROLLED USING SMALL AIR HORN.

## 2019-03-12 ASSESSMENT — ACTIVITIES OF DAILY LIVING (ADL)
HOUSEKEEPING_ASSISTANCE: 6
DRESSING_LB_EQUIPMENT_USED: REACHER
TOILETING_EQUIPMENT_USED: ELEVATED TOILET
LAUNDRY_ASSISTANCE: 6
DRESSING_UB_ASSISTANCE: 1
TOILETING_ASSISTANCE: 2
GROOMING_ASSISTANCE: 0
SHOPPING_ASSISTANCE: 6
TRANSPORTATION_ASSISTANCE: 6
ORAL_CARE_ASSISTANCE: 0
BATHING_ASSISTIVE_EQUIPMENT_NEEDED: TUB TRANSFER BENCH
MEAL_PREP_ASSISTANCE: 5
EATING_ASSISTANCE: 0
TELEPHONE_ASSISTANCE: 0
DRESSING_LB_ASSISTANCE: 5
GROOMING_EQUIPMENT_USED: SEATED
BATHING_ASSISTANCE: 5

## 2019-03-12 ASSESSMENT — ENCOUNTER SYMPTOMS: MENTAL STATUS CHANGE: 0

## 2019-03-13 ENCOUNTER — APPOINTMENT (OUTPATIENT)
Dept: MEDICAL GROUP | Age: 54
End: 2019-03-13
Payer: COMMERCIAL

## 2019-03-13 ASSESSMENT — ENCOUNTER SYMPTOMS
DEBILITATING PAIN: 1
MENTAL STATUS CHANGE: 0

## 2019-03-14 ENCOUNTER — HOME CARE VISIT (OUTPATIENT)
Dept: HOME HEALTH SERVICES | Facility: HOME HEALTHCARE | Age: 54
End: 2019-03-14
Payer: COMMERCIAL

## 2019-03-14 VITALS
TEMPERATURE: 97.6 F | DIASTOLIC BLOOD PRESSURE: 68 MMHG | SYSTOLIC BLOOD PRESSURE: 108 MMHG | HEART RATE: 98 BPM | OXYGEN SATURATION: 96 % | RESPIRATION RATE: 16 BRPM

## 2019-03-14 VITALS
RESPIRATION RATE: 16 BRPM | SYSTOLIC BLOOD PRESSURE: 108 MMHG | HEART RATE: 98 BPM | DIASTOLIC BLOOD PRESSURE: 68 MMHG | OXYGEN SATURATION: 96 % | TEMPERATURE: 97.6 F

## 2019-03-14 PROCEDURE — G0151 HHCP-SERV OF PT,EA 15 MIN: HCPCS

## 2019-03-14 PROCEDURE — G0299 HHS/HOSPICE OF RN EA 15 MIN: HCPCS

## 2019-03-14 ASSESSMENT — ENCOUNTER SYMPTOMS
DEBILITATING PAIN: 1
VOMITING: DENIES
NAUSEA: DENIES

## 2019-03-15 ENCOUNTER — OFFICE VISIT (OUTPATIENT)
Dept: MEDICAL GROUP | Age: 54
End: 2019-03-15
Payer: COMMERCIAL

## 2019-03-15 VITALS
TEMPERATURE: 97.6 F | RESPIRATION RATE: 20 BRPM | OXYGEN SATURATION: 98 % | SYSTOLIC BLOOD PRESSURE: 116 MMHG | BODY MASS INDEX: 33.66 KG/M2 | HEART RATE: 89 BPM | DIASTOLIC BLOOD PRESSURE: 80 MMHG | WEIGHT: 190 LBS | HEIGHT: 63 IN

## 2019-03-15 DIAGNOSIS — M16.12 PRIMARY OSTEOARTHRITIS OF LEFT HIP: ICD-10-CM

## 2019-03-15 DIAGNOSIS — M50.30 DDD (DEGENERATIVE DISC DISEASE), CERVICAL: ICD-10-CM

## 2019-03-15 DIAGNOSIS — Z00.8 ENCOUNTER FOR WORK CAPABILITY ASSESSMENT: ICD-10-CM

## 2019-03-15 DIAGNOSIS — M51.36 DDD (DEGENERATIVE DISC DISEASE), LUMBAR: ICD-10-CM

## 2019-03-15 PROCEDURE — 99214 OFFICE O/P EST MOD 30 MIN: CPT | Performed by: INTERNAL MEDICINE

## 2019-03-15 PROCEDURE — 7101 PR PHYSICAL: Performed by: INTERNAL MEDICINE

## 2019-03-15 RX ORDER — MORPHINE SULFATE 15 MG/1
TABLET, FILM COATED, EXTENDED RELEASE ORAL
Refills: 0 | COMMUNITY
Start: 2019-01-27 | End: 2019-08-15

## 2019-03-15 RX ORDER — OXYCODONE HCL 10 MG/1
10 TABLET, FILM COATED, EXTENDED RELEASE ORAL EVERY 12 HOURS
COMMUNITY
End: 2019-08-15

## 2019-03-15 ASSESSMENT — ENCOUNTER SYMPTOMS
NEUROLOGICAL NEGATIVE: 1
CARDIOVASCULAR NEGATIVE: 1
CONSTITUTIONAL NEGATIVE: 1
GASTROINTESTINAL NEGATIVE: 1
RESPIRATORY NEGATIVE: 1
PSYCHIATRIC NEGATIVE: 1
NECK PAIN: 0
EYES NEGATIVE: 1
BACK PAIN: 0

## 2019-03-15 ASSESSMENT — PATIENT HEALTH QUESTIONNAIRE - PHQ9: CLINICAL INTERPRETATION OF PHQ2 SCORE: 0

## 2019-03-15 NOTE — PROGRESS NOTES
Subjective:     Karine Ovalle is a 53 y.o. female who presents with Follow-Up (Surgical follow up. Disability forms)    HPI  The patient presents today for a surgical follow-up evaluation. Patient was evaluated and admitted to [facility] on 2/13/19 secondary to a fall in the shower. Negative for LOC. Patient underwent left hip arthroplasty with Dr. Levine on the same down and tolerate the procedure well. She was evaluated by PT on 2/14/19 and was unable to participate in gait. Patient was then admitted to [facility] on 2/15/19 for acute inpatient rehabilitation until 3/1/19 when she was discharged with good improvement in mobility and ADLs. She was prescribed a 2 week prescription for Morphine and Oxycodone and told to follow up with a pain specialist. At this time patient reports significant improvement with her left hip pain. Patient had short term disability paperwork completed prior to her discharge, but she is requesting an extension on her disability until April 8th, 2019. She is scheduled to follow up with Dr. Levine on 3/19/19 for a weight-bearing evaluation. Patient denies any active neck or back pain at this time and reports she is capable of returning to work once her hip heals. Patient continues to follow up with pain management for pain medication, attend PT, and attend OT.    Patient Active Problem List   Diagnosis   • History of gastric bypass   • Mild intermittent asthma without complication   • Vitamin B 12 deficiency   • Chronic bilateral low back pain with bilateral sciatica- pain mgt; KYLAH ADKINS   • Colon cancer screening- needs   • Venous insufficiency   • Uncomplicated opioid dependence (CMS-HCC)- kylah adkins   • Essential hypertension   • DDD (degenerative disc disease), lumbar   • Primary osteoarthritis of left hip- dr levine; left THR tbd feb 6, 2019   • Obesity (BMI 30-39.9)   • DDD (degenerative disc disease), cervical- MOD-SEVERE SPINE NV- dr brennan;  fusion and laminectomy C3-T1 12/5/2018 dr brennan   • Encounter for work capability assessment- FMLA PAPERWORK   • Cervical myelopathy (HCC)   • Edema   • Vitamin D deficiency   • Anemia   • Primary insomnia   • Mixed stress and urge urinary incontinence   • Status post left hip replacement       Outpatient Medications Prior to Visit   Medication Sig Dispense Refill   • Esomeprazole Magnesium (NEXIUM 24HR PO) Take 20 mg by mouth 1 time daily as needed (heartburn).     • furosemide (LASIX) 40 MG Tab Take 40 mg by mouth every day. May hold dose if no ankle swelling.     • senna-docusate (PERICOLACE OR SENOKOT S) 8.6-50 MG Tab Take 1 Tab by mouth 2 times a day.     • Potassium Chloride (KLOR-CON PO) Take 20 mEq by mouth every day. May hold if not taking Lasix.     • polyethylene glycol 3350 (MIRALAX) Powder Take 17 g by mouth every day.     • Omega-3 Fatty Acids (FISH OIL) 1000 MG Cap capsule Take 1,000 mg by mouth every day.     • Multiple Vitamins-Minerals (ONE-A-DAY WOMENS 50 PLUS PO) Take 1 tablet by mouth every day.     • ferrous sulfate 325 (65 Fe) MG tablet Take 1 Tab by mouth every morning with breakfast. 30 Tab 1   • aspirin 81 MG EC tablet Take 1 Tab by mouth 2 times a day. 60 Tab 0   • gabapentin (NEURONTIN) 300 MG Cap Take 1 Cap by mouth 3 times a day. 90 Cap 5   • methocarbamol (ROBAXIN) 500 MG Tab Take 2 Tabs by mouth 4 times a day. 120 Tab 0   • vitamin D, Ergocalciferol, (DRISDOL) 52647 units Cap capsule Take 1 Cap by mouth every 7 days for 8 doses. 30 Cap    • Misc. Devices Misc Adult briefs XXL (FEMALE) FOR URINARY INCONTINENCE- ANY BRAND PER PT PREFERENCE OR INSURANC ALLOWANCE; TO CHANGE  Each 5   • ascorbic acid (VITAMIN C) 500 MG tablet Take 1 Tab by mouth every morning with breakfast. 30 Tab 3   • cyanocobalamin (VITAMIN B12) 1000 MCG Tab Take 1 Tab by mouth every day. 90 Tab 4   • Calcium Carbonate-Vitamin D (CALCIUM 600 + D) 600-400 MG-UNIT Tab Take 1 Tab by mouth 3 times a day.     •  "non-formulary med Take 3 Tabs by mouth 2 Times a Day. Collagen Advanced Formula       No facility-administered medications prior to visit.         No Known Allergies    Review of Systems   Constitutional: Negative.    HENT: Negative.    Eyes: Negative.    Respiratory: Negative.    Cardiovascular: Negative.    Gastrointestinal: Negative.    Genitourinary: Negative.    Musculoskeletal: Positive for joint pain (Left Hip). Negative for back pain and neck pain.   Skin: Negative.    Neurological: Negative.    Endo/Heme/Allergies: Negative.    Psychiatric/Behavioral: Negative.    All other systems reviewed and are negative.       Objective:     /80   Pulse 89   Temp 36.4 °C (97.6 °F)   Resp 20   Ht 1.6 m (5' 3\")   Wt 86.2 kg (190 lb)   LMP  (LMP Unknown)   SpO2 98%   BMI 33.66 kg/m²     Physical Exam   Constitutional: Oriented to person, place, and time. Appears well-developed and well-nourished. No distress.   Head: Normocephalic and atraumatic.   Right Ear: External ear normal.   Left Ear: External ear normal.   Nose: Nose normal.   Mouth/Throat: Oropharynx is clear and moist. No oropharyngeal exudate.   Eyes: Pupils are equal, round, and reactive to light. Conjunctivae and EOM are normal. Right eye exhibits no discharge. Left eye exhibits no discharge. No scleral icterus.   Neck: Normal range of motion. Neck supple. No JVD present. No tracheal deviation present. No thyromegaly present.   Cardiovascular: Normal rate, regular rhythm, normal heart sounds and intact distal pulses.  Exam reveals no gallop and no friction rub.    No murmur heard.  Pulmonary/Chest: Effort normal. No stridor. No respiratory distress. No wheezing or rales. No tenderness.   Abdominal: Soft. Bowel sounds are normal. No distension and no mass. There is no tenderness. There is no rebound and no guarding. No hernia.   Musculoskeletal: Normal range of motion No edema or tenderness.   Lymphadenopathy: No cervical adenopathy. "   Neurological: Alert and oriented to person, place, and time. Normal reflexes. Normal reflexes. No cranial nerve deficit. Normal muscle tone. Coordination normal.   Skin: Skin is warm and dry. No rash noted. Not diaphoretic. No erythema. No pallor.   Psychiatric: Normal mood and affect. Behavior is normal. Judgment and thought content normal.   Nursing note and vitals reviewed.    Admission on 02/15/2019, Discharged on 03/01/2019   Component Date Value   • WBC 02/16/2019 8.7    • RBC 02/16/2019 2.98*   • Hemoglobin 02/16/2019 7.9*   • Hematocrit 02/16/2019 24.0*   • MCV 02/16/2019 80.5*   • MCH 02/16/2019 26.5*   • MCHC 02/16/2019 32.9*   • RDW 02/16/2019 49.9    • Platelet Count 02/16/2019 282    • MPV 02/16/2019 10.1    • Neutrophils-Polys 02/16/2019 53.60    • Lymphocytes 02/16/2019 31.00    • Monocytes 02/16/2019 9.20    • Eosinophils 02/16/2019 5.50    • Basophils 02/16/2019 0.50    • Immature Granulocytes 02/16/2019 0.20    • Nucleated RBC 02/16/2019 0.00    • Neutrophils (Absolute) 02/16/2019 4.65    • Lymphs (Absolute) 02/16/2019 2.69    • Monos (Absolute) 02/16/2019 0.80    • Eos (Absolute) 02/16/2019 0.48    • Baso (Absolute) 02/16/2019 0.04    • Immature Granulocytes (a* 02/16/2019 0.02    • NRBC (Absolute) 02/16/2019 0.00    • Sodium 02/16/2019 139    • Potassium 02/16/2019 3.6    • Chloride 02/16/2019 108    • Co2 02/16/2019 27    • Anion Gap 02/16/2019 4.0    • Glucose 02/16/2019 105*   • Bun 02/16/2019 12    • Creatinine 02/16/2019 0.39*   • Calcium 02/16/2019 8.4*   • AST(SGOT) 02/16/2019 14    • ALT(SGPT) 02/16/2019 6    • Alkaline Phosphatase 02/16/2019 94    • Total Bilirubin 02/16/2019 0.3    • Albumin 02/16/2019 2.4*   • Total Protein 02/16/2019 4.9*   • Globulin 02/16/2019 2.5    • A-G Ratio 02/16/2019 1.0    • Glycohemoglobin 02/16/2019 5.0    • Est Avg Glucose 02/16/2019 97    • TSH 02/16/2019 2.200    • GFR If  02/16/2019 >60    • GFR If Non  [employer]* 02/16/2019 >60     • WBC 02/20/2019 7.1    • RBC 02/20/2019 2.93*   • Hemoglobin 02/20/2019 7.8*   • Hematocrit 02/20/2019 23.2*   • MCV 02/20/2019 79.2*   • MCH 02/20/2019 26.6*   • MCHC 02/20/2019 33.6    • RDW 02/20/2019 49.1    • Platelet Count 02/20/2019 389    • MPV 02/20/2019 9.4    • Neutrophils-Polys 02/20/2019 54.90    • Lymphocytes 02/20/2019 29.40    • Monocytes 02/20/2019 9.00    • Eosinophils 02/20/2019 5.60    • Basophils 02/20/2019 0.70    • Immature Granulocytes 02/20/2019 0.40    • Nucleated RBC 02/20/2019 0.00    • Neutrophils (Absolute) 02/20/2019 3.88    • Lymphs (Absolute) 02/20/2019 2.08    • Monos (Absolute) 02/20/2019 0.64    • Eos (Absolute) 02/20/2019 0.40    • Baso (Absolute) 02/20/2019 0.05    • Immature Granulocytes (a* 02/20/2019 0.03    • NRBC (Absolute) 02/20/2019 0.00    • WBC 02/25/2019 7.2    • RBC 02/25/2019 3.39*   • Hemoglobin 02/25/2019 9.0*   • Hematocrit 02/25/2019 27.8*   • MCV 02/25/2019 82.0    • MCH 02/25/2019 26.5*   • MCHC 02/25/2019 32.4*   • RDW 02/25/2019 54.9*   • Platelet Count 02/25/2019 492*   • MPV 02/25/2019 9.2    • Neutrophils-Polys 02/25/2019 50.10    • Lymphocytes 02/25/2019 35.60    • Monocytes 02/25/2019 8.20    • Eosinophils 02/25/2019 5.00    • Basophils 02/25/2019 0.70    • Immature Granulocytes 02/25/2019 0.40    • Nucleated RBC 02/25/2019 0.00    • Neutrophils (Absolute) 02/25/2019 3.60    • Lymphs (Absolute) 02/25/2019 2.56    • Monos (Absolute) 02/25/2019 0.59    • Eos (Absolute) 02/25/2019 0.36    • Baso (Absolute) 02/25/2019 0.05    • Immature Granulocytes (a* 02/25/2019 0.03    • NRBC (Absolute) 02/25/2019 0.00    • Reticulocyte Count 02/25/2019 2.7*   • Retic, Absolute 02/25/2019 0.09*   • Imm. Reticulocyte Fracti* 02/25/2019 33.4*   • Retic Hgb Equivalent 02/25/2019 29.6    • Iron 02/25/2019 31*   • Total Iron Binding 02/25/2019 283    • % Saturation 02/25/2019 11*      Lab Results   Component Value Date/Time    HBA1C 5.0 02/16/2019 05:52 AM    HBA1C 5.9 (H)  12/12/2018 06:00 AM     Lab Results   Component Value Date/Time    SODIUM 139 02/16/2019 05:52 AM    POTASSIUM 3.6 02/16/2019 05:52 AM    CHLORIDE 108 02/16/2019 05:52 AM    CO2 27 02/16/2019 05:52 AM    GLUCOSE 105 (H) 02/16/2019 05:52 AM    BUN 12 02/16/2019 05:52 AM    CREATININE 0.39 (L) 02/16/2019 05:52 AM    CREATININE 0.88 08/14/2010 12:00 AM    BUNCREATRAT 13 08/14/2010 12:00 AM    GLOMRATE >59 08/14/2010 12:00 AM    ALKPHOSPHAT 94 02/16/2019 05:52 AM    ASTSGOT 14 02/16/2019 05:52 AM    ALTSGPT 6 02/16/2019 05:52 AM    TBILIRUBIN 0.3 02/16/2019 05:52 AM     Lab Results   Component Value Date/Time    INR 1.00 02/13/2019 11:44 AM    INR 1.01 12/04/2018 11:15 AM    INR 1.04 05/18/2012 12:20 PM     Lab Results   Component Value Date/Time    CHOLSTRLTOT 168 02/22/2018 05:05 PM    LDL 84 02/22/2018 05:05 PM    HDL 67 02/22/2018 05:05 PM    TRIGLYCERIDE 85 02/22/2018 05:05 PM       No results found for: TESTOSTERONE  Lab Results   Component Value Date/Time    TSH 2.110 08/14/2010 12:00 AM     Lab Results   Component Value Date/Time    FREET4 0.72 05/03/2016 07:32 AM     Lab Results   Component Value Date/Time    URICACID 5.6 08/12/2014 04:49 PM     No components found for: VITB12  Lab Results   Component Value Date/Time    25HYDROXY 17 (L) 02/15/2019 03:30 AM    25HYDROXY 14 (L) 12/12/2018 06:00 AM        Assessment/Plan:     1. Primary osteoarthritis of left hip- dr levine; left THR done feb 13, 2019- disabled til 4/8/19  Under good control. Continue same regimen. Patient will continue to follow up with Dr. Levine and Pain Management.     2. DDD (degenerative disc disease), cervical- MOD-SEVERE SPINE NV- dr brennan; fusion and laminectomy C3-T1 12/5/2018 dr brennan  Patient denies any back pain at this time and reports it is not interfering with her ADL or work capability.     3. Encounter for work capability assessment- FMLA PAPERWORK  [place] for [place] to last until 4/8/19.        Patient is  doing well 1 month status post left total hip replacement but still in a wheelchair.  She is only doing toe touching at this time.  She says her orthopedic surgeon will clear her for regard to her hip 1 month from now and she will return to work full-time with no restrictions on 4/8/2019.    I asked her if she will be able to work full-time without restrictions just in spite of her chronic back and neck problems for which she sees pain management for her DDD of both cervical and lumbar spine.  She says she will be able to and plans on doing this.  However she will continue with her pain management and narcotic therapy from them with monthly visits.  It is yet to determine whether or not she will be able to return full time with these limitations in mind with regard to her back.    However her left hip should be fully recovered 1 month from canal and paperwork was signed for her to return to work at that time on 4/8/2019.            Follow-up in 1 month.     40 minute face-to-face encounter took place today. More than half of this time was spent in the coordination of care of the above problems, as well as counseling.    IRamu (Jenniferibkevin), am scribing for, and in the presence of, Micky Rubin M.D.    Electronically signed by: Ramu George (Ludy), 3/15/2019    IMicky M.D., personally performed the services described in this documentation, as scribed by Ramu George in my presence, and it is both accurate and complete.

## 2019-03-18 ENCOUNTER — HOME CARE VISIT (OUTPATIENT)
Dept: HOME HEALTH SERVICES | Facility: HOME HEALTHCARE | Age: 54
End: 2019-03-18
Payer: COMMERCIAL

## 2019-03-18 VITALS
RESPIRATION RATE: 17 BRPM | TEMPERATURE: 99.9 F | HEART RATE: 99 BPM | OXYGEN SATURATION: 98 % | DIASTOLIC BLOOD PRESSURE: 58 MMHG | SYSTOLIC BLOOD PRESSURE: 108 MMHG

## 2019-03-18 PROCEDURE — G0152 HHCP-SERV OF OT,EA 15 MIN: HCPCS

## 2019-03-18 ASSESSMENT — ENCOUNTER SYMPTOMS: DEBILITATING PAIN: 1

## 2019-03-19 ENCOUNTER — HOME CARE VISIT (OUTPATIENT)
Dept: HOME HEALTH SERVICES | Facility: HOME HEALTHCARE | Age: 54
End: 2019-03-19
Payer: COMMERCIAL

## 2019-03-19 PROCEDURE — G0156 HHCP-SVS OF AIDE,EA 15 MIN: HCPCS

## 2019-03-20 ENCOUNTER — HOME CARE VISIT (OUTPATIENT)
Dept: HOME HEALTH SERVICES | Facility: HOME HEALTHCARE | Age: 54
End: 2019-03-20
Payer: COMMERCIAL

## 2019-03-20 VITALS
OXYGEN SATURATION: 97 % | SYSTOLIC BLOOD PRESSURE: 100 MMHG | DIASTOLIC BLOOD PRESSURE: 60 MMHG | HEART RATE: 98 BPM | TEMPERATURE: 97.9 F | RESPIRATION RATE: 16 BRPM

## 2019-03-20 PROCEDURE — G0151 HHCP-SERV OF PT,EA 15 MIN: HCPCS

## 2019-03-20 ASSESSMENT — ENCOUNTER SYMPTOMS: DEBILITATING PAIN: 1

## 2019-03-21 ENCOUNTER — HOME CARE VISIT (OUTPATIENT)
Dept: HOME HEALTH SERVICES | Facility: HOME HEALTHCARE | Age: 54
End: 2019-03-21
Payer: COMMERCIAL

## 2019-03-21 ENCOUNTER — HOME CARE VISIT (OUTPATIENT)
Dept: HOME HEALTH SERVICES | Facility: HOME HEALTHCARE | Age: 54
End: 2019-03-21

## 2019-03-21 VITALS
HEART RATE: 90 BPM | DIASTOLIC BLOOD PRESSURE: 60 MMHG | TEMPERATURE: 98 F | RESPIRATION RATE: 18 BRPM | OXYGEN SATURATION: 96 % | SYSTOLIC BLOOD PRESSURE: 100 MMHG

## 2019-03-21 VITALS
HEART RATE: 90 BPM | TEMPERATURE: 98 F | RESPIRATION RATE: 18 BRPM | OXYGEN SATURATION: 96 % | SYSTOLIC BLOOD PRESSURE: 110 MMHG | DIASTOLIC BLOOD PRESSURE: 60 MMHG

## 2019-03-21 VITALS
WEIGHT: 197 LBS | SYSTOLIC BLOOD PRESSURE: 118 MMHG | RESPIRATION RATE: 18 BRPM | TEMPERATURE: 98.2 F | OXYGEN SATURATION: 99 % | BODY MASS INDEX: 34.9 KG/M2 | DIASTOLIC BLOOD PRESSURE: 80 MMHG | HEART RATE: 99 BPM

## 2019-03-21 PROCEDURE — G0152 HHCP-SERV OF OT,EA 15 MIN: HCPCS

## 2019-03-21 PROCEDURE — G0495 RN CARE TRAIN/EDU IN HH: HCPCS

## 2019-03-22 ENCOUNTER — HOME CARE VISIT (OUTPATIENT)
Dept: HOME HEALTH SERVICES | Facility: HOME HEALTHCARE | Age: 54
End: 2019-03-22
Payer: COMMERCIAL

## 2019-03-22 VITALS
DIASTOLIC BLOOD PRESSURE: 60 MMHG | OXYGEN SATURATION: 98 % | RESPIRATION RATE: 18 BRPM | HEART RATE: 98 BPM | TEMPERATURE: 97.5 F | SYSTOLIC BLOOD PRESSURE: 110 MMHG

## 2019-03-22 PROCEDURE — G0180 MD CERTIFICATION HHA PATIENT: HCPCS | Performed by: INTERNAL MEDICINE

## 2019-03-22 PROCEDURE — G0151 HHCP-SERV OF PT,EA 15 MIN: HCPCS

## 2019-03-22 ASSESSMENT — PATIENT HEALTH QUESTIONNAIRE - PHQ9: CLINICAL INTERPRETATION OF PHQ2 SCORE: 0

## 2019-03-22 ASSESSMENT — ENCOUNTER SYMPTOMS: DEBILITATING PAIN: 1

## 2019-03-22 ASSESSMENT — ACTIVITIES OF DAILY LIVING (ADL): HOME_HEALTH_OASIS: 01

## 2019-03-24 VITALS
HEART RATE: 90 BPM | SYSTOLIC BLOOD PRESSURE: 110 MMHG | OXYGEN SATURATION: 96 % | DIASTOLIC BLOOD PRESSURE: 60 MMHG | TEMPERATURE: 98.1 F | RESPIRATION RATE: 18 BRPM

## 2019-03-24 ASSESSMENT — ACTIVITIES OF DAILY LIVING (ADL)
ORAL_CARE_ASSISTANCE: 0
TOILETING_ASSISTANCE: 4
BATHING_ASSISTANCE: 4
EATING_ASSISTANCE: 0
HOUSEKEEPING_ASSISTANCE: 5
MEAL_PREP_ASSISTANCE: 4
LAUNDRY_ASSISTANCE: 5
SHOPPING_ASSISTANCE: 6
DRESSING_UB_ASSISTANCE: 4
TRANSPORTATION_ASSISTANCE: 6
TELEPHONE_ASSISTANCE: 0
GROOMING_ASSISTANCE: 4
DRESSING_LB_ASSISTANCE: 4

## 2019-03-25 ASSESSMENT — ACTIVITIES OF DAILY LIVING (ADL): OASIS_M1830: 01

## 2019-04-01 RX ORDER — METHOCARBAMOL 500 MG/1
1000 TABLET, FILM COATED ORAL 4 TIMES DAILY
Qty: 120 TAB | Refills: 0 | Status: SHIPPED | OUTPATIENT
Start: 2019-04-01 | End: 2019-05-28 | Stop reason: SDUPTHER

## 2019-04-01 NOTE — TELEPHONE ENCOUNTER
Was the patient seen in the last year in this department? No     Does patient have an active prescription for medications requested? Yes    Received Request Via: Pharmacy     Please refill per Dr. Harper. Patient is no longer under providers care, thank you.

## 2019-04-17 ENCOUNTER — OFFICE VISIT (OUTPATIENT)
Dept: URGENT CARE | Facility: CLINIC | Age: 54
End: 2019-04-17
Payer: COMMERCIAL

## 2019-04-17 ENCOUNTER — APPOINTMENT (OUTPATIENT)
Dept: RADIOLOGY | Facility: IMAGING CENTER | Age: 54
End: 2019-04-17
Attending: NURSE PRACTITIONER
Payer: COMMERCIAL

## 2019-04-17 VITALS
HEART RATE: 100 BPM | DIASTOLIC BLOOD PRESSURE: 70 MMHG | TEMPERATURE: 98.3 F | OXYGEN SATURATION: 98 % | WEIGHT: 197 LBS | HEIGHT: 63 IN | BODY MASS INDEX: 34.91 KG/M2 | SYSTOLIC BLOOD PRESSURE: 110 MMHG

## 2019-04-17 DIAGNOSIS — J01.90 ACUTE NON-RECURRENT SINUSITIS, UNSPECIFIED LOCATION: ICD-10-CM

## 2019-04-17 DIAGNOSIS — M25.452 HIP SWELLING, LEFT: ICD-10-CM

## 2019-04-17 DIAGNOSIS — Z96.642 STATUS POST LEFT HIP REPLACEMENT: ICD-10-CM

## 2019-04-17 DIAGNOSIS — M21.952: ICD-10-CM

## 2019-04-17 PROCEDURE — 99214 OFFICE O/P EST MOD 30 MIN: CPT | Performed by: NURSE PRACTITIONER

## 2019-04-17 PROCEDURE — 72170 X-RAY EXAM OF PELVIS: CPT | Mod: TC,FY | Performed by: NURSE PRACTITIONER

## 2019-04-17 RX ORDER — AMOXICILLIN AND CLAVULANATE POTASSIUM 875; 125 MG/1; MG/1
1 TABLET, FILM COATED ORAL 2 TIMES DAILY
Qty: 20 TAB | Refills: 0 | Status: SHIPPED | OUTPATIENT
Start: 2019-04-17 | End: 2019-04-27

## 2019-04-17 ASSESSMENT — ENCOUNTER SYMPTOMS
COUGH: 1
FEVER: 0
CARDIOVASCULAR NEGATIVE: 1
SHORTNESS OF BREATH: 0
SORE THROAT: 1
CONSTITUTIONAL NEGATIVE: 1
SPUTUM PRODUCTION: 1
SINUS PAIN: 1
NEUROLOGICAL NEGATIVE: 1

## 2019-04-18 NOTE — PROGRESS NOTES
Subjective:     Karine Ovalle is a 53 y.o. female who presents for Hip Pain (sx 2/19, new hip, cable inserted in surgery, worried cable may have come loose and now there is a lump) and Cough (nasal drainage, productive cough, )       Other   Associated symptoms include congestion, coughing and a sore throat. Pertinent negatives include no chest pain, fever or rash.     Patient had a total left hip replacement about 2 months ago. She states that about 3 weeks ago she noticed a soft lump at her left hip. She states she is concerned that it could be due to the hardware or wires which were placed. She denies pain, warmth, tenderness, or redness to the area. She has a follow-up appointment with her surgeon in about 1 month from today.    Patient also complaining of nasal congestion with drainage, sinus pain, sore throat, and a productive cough for 2 weeks which has been getting worse. She states that her neighbors have been smoking and the smoke makes its way into her home and aggravates her symptoms.    PMH:  has a past medical history of Anemia; Arthritis; At risk for falls; Chronic back pain; Dental disorder; Pain (12/04/2018); Pain (02/04/2019); and Urinary incontinence. She also has no past medical history of ASTHMA; Breast cancer (HCC); or Diabetes.    MEDS:   Current Outpatient Prescriptions:   •  amoxicillin-clavulanate (AUGMENTIN) 875-125 MG Tab, Take 1 Tab by mouth 2 times a day for 10 days., Disp: 20 Tab, Rfl: 0  •  methocarbamol (ROBAXIN) 500 MG Tab, TAKE 2 TABS BY MOUTH 4 TIMES A DAY., Disp: 120 Tab, Rfl: 0  •  morphine ER (MS CONTIN) 15 MG Tab CR tablet, TAKE 1 TABLET BY MOUTH EVERY 12 HOURS DO NOT DRIVE OR DRINK ALCOHOL WHILE TAKING MAY CAUSE SEDATION, Disp: , Rfl: 0  •  oxyCODONE CR (OXYCONTIN) 10 MG Tablet Extended Release 12 hour Abuse-Deterrent, Take 10 mg by mouth every 12 hours., Disp: , Rfl:   •  Esomeprazole Magnesium (NEXIUM 24HR PO), Take 20 mg by mouth 1 time daily as needed (heartburn).,  Disp: , Rfl:   •  furosemide (LASIX) 40 MG Tab, Take 40 mg by mouth every day. May hold dose if no ankle swelling., Disp: , Rfl:   •  senna-docusate (PERICOLACE OR SENOKOT S) 8.6-50 MG Tab, Take 1 Tab by mouth 2 times a day., Disp: , Rfl:   •  Potassium Chloride (KLOR-CON PO), Take 20 mEq by mouth every day. May hold if not taking Lasix., Disp: , Rfl:   •  polyethylene glycol 3350 (MIRALAX) Powder, Take 17 g by mouth every day., Disp: , Rfl:   •  Calcium Carbonate-Vitamin D (CALCIUM 600 + D) 600-400 MG-UNIT Tab, Take 1 Tab by mouth 3 times a day., Disp: , Rfl:   •  Omega-3 Fatty Acids (FISH OIL) 1000 MG Cap capsule, Take 1,000 mg by mouth every day., Disp: , Rfl:   •  Multiple Vitamins-Minerals (ONE-A-DAY WOMENS 50 PLUS PO), Take 1 tablet by mouth every day., Disp: , Rfl:   •  non-formulary med, Take 3 Tabs by mouth 2 Times a Day. Collagen Advanced Formula, Disp: , Rfl:   •  ferrous sulfate 325 (65 Fe) MG tablet, Take 1 Tab by mouth every morning with breakfast., Disp: 30 Tab, Rfl: 1  •  gabapentin (NEURONTIN) 300 MG Cap, Take 1 Cap by mouth 3 times a day., Disp: 90 Cap, Rfl: 5  •  Misc. Devices Misc, Adult briefs XXL (FEMALE) FOR URINARY INCONTINENCE- ANY BRAND PER PT PREFERENCE OR INSURANC ALLOWANCE; TO CHANGE QID, Disp: 120 Each, Rfl: 5  •  ascorbic acid (VITAMIN C) 500 MG tablet, Take 1 Tab by mouth every morning with breakfast., Disp: 30 Tab, Rfl: 3  •  cyanocobalamin (VITAMIN B12) 1000 MCG Tab, Take 1 Tab by mouth every day., Disp: 90 Tab, Rfl: 4  •  aspirin 81 MG EC tablet, Take 1 Tab by mouth 2 times a day. (Patient not taking: Reported on 4/17/2019), Disp: 60 Tab, Rfl: 0    ALLERGIES: No Known Allergies    SURGHX:   Past Surgical History:   Procedure Laterality Date   • HIP ARTHROPLASTY TOTAL Left 2/13/2019    Procedure: HIP ARTHROPLASTY TOTAL, Cabling of intra-operative calcar fracture;  Surgeon: Bryon Levine M.D.;  Location: SURGERY [facility];  Service: Orthopedics   • CERVICAL FUSION POSTERIOR   "12/7/2018    Procedure: CERVICAL FUSION POSTERIOR- STAGE #2 C3-5 AND C3-T1;  Surgeon: Emre Mendoza III, M.D.;  Location: SURGERY [facility];  Service: Neurosurgery   • CERVICAL LAMINECTOMY POSTERIOR  12/7/2018    Procedure: CERVICAL LAMINECTOMY POSTERIOR C2-T1;  Surgeon: Emre Mendoza III, M.D.;  Location: SURGERY [facility];  Service: Neurosurgery   • CERVICAL DISK AND FUSION ANTERIOR  12/5/2018    Procedure: CERVICAL DISK AND FUSION ANTERIOR-STAGE #1 C4-5;  Surgeon: Emre Mendoza III, M.D.;  Location: SURGERY [facility];  Service: Neurosurgery   • CORPECTOMY  12/5/2018    Procedure: CORPECTOMY;  Surgeon: Emre Mendoza III, M.D.;  Location: SURGERY [facility];  Service: Neurosurgery   • HIP ARTHROPLASTY TOTAL Right 3/20/2018    Procedure: HIP ARTHROPLASTY TOTAL;  Surgeon: Bryon Levine M.D.;  Location: SURGERY [facility];  Service: Orthopedics   • KNEE ARTHROPLASTY TOTAL Right 10/20/2015    Procedure: KNEE ARTHROPLASTY TOTAL;  Surgeon: Bryon Levine M.D.;  Location: SURGERY [facility];  Service:    • APPENDECTOMY LAPAROSCOPIC  3/21/2013    Performed by Dali Queen M.D. at [facility]   • DEBRIDEMENT  5/17/2012    Performed by BRADLEY DYKES at [facility]   • PLASTIC SURGERY  2004    excess skin on arms and legs removed   • MAMMOPLASTY AUGMENTATION Bilateral 2004    breast implants and \"tummy tuck\"   • GASTRIC BYPASS LAPAROSCOPIC  2002    x 2   • ABDOMINAL EXPLORATION       SOCHX:  reports that she has never smoked. She has never used smokeless tobacco. She reports that she drinks alcohol. She reports that she does not use drugs.     FH: Reviewed with patient, not pertinent to this visit.     Review of Systems   Constitutional: Negative.  Negative for fever and malaise/fatigue.   HENT: Positive for congestion, sinus pain and sore throat. Negative for ear pain.    Respiratory: Positive for cough and sputum production. Negative for shortness of " "breath.    Cardiovascular: Negative.  Negative for chest pain.   Musculoskeletal:        Localized swelling of left hip   Skin: Negative.  Negative for rash.   Neurological: Negative.    All other systems reviewed and are negative.    Objective:     /70 (BP Location: Left arm, Patient Position: Sitting, BP Cuff Size: Large adult)   Pulse 100   Temp 36.8 °C (98.3 °F)   Ht 1.6 m (5' 3\")   Wt 89.4 kg (197 lb)   LMP  (LMP Unknown)   SpO2 98%   BMI 34.90 kg/m²     Physical Exam   Constitutional: She is oriented to person, place, and time. She appears well-developed and well-nourished. She is cooperative.  Non-toxic appearance. No distress.   HENT:   Head: Normocephalic and atraumatic.   Right Ear: External ear normal.   Left Ear: External ear normal.   Nose: Mucosal edema and rhinorrhea present. Right sinus exhibits maxillary sinus tenderness. Left sinus exhibits maxillary sinus tenderness.   Mouth/Throat: Uvula is midline, oropharynx is clear and moist and mucous membranes are normal.   Eyes: Pupils are equal, round, and reactive to light. Conjunctivae and EOM are normal.   Neck: Normal range of motion.   Cardiovascular: Normal rate, regular rhythm, normal heart sounds and normal pulses.    Pulmonary/Chest: Effort normal. No respiratory distress. She has no decreased breath sounds. She has rhonchi.   Abdominal: Bowel sounds are normal.   Musculoskeletal: Normal range of motion. She exhibits no deformity.        Left hip: She exhibits normal range of motion and normal strength.        Legs:  Lymphadenopathy:     She has no cervical adenopathy.   Neurological: She is alert and oriented to person, place, and time. She has normal strength. No sensory deficit.   Skin: Skin is warm, dry and intact. Capillary refill takes less than 2 seconds. No erythema.   Psychiatric: She has a normal mood and affect. Her behavior is normal.   Vitals reviewed.    X-ray of pelvis:    Narrative     4/17/2019 6:47 " PM    HISTORY/REASON FOR EXAM:  Left pelvic pain with palpable lesion.    TECHNIQUE/EXAM DESCRIPTION AND NUMBER OF VIEWS:  1 view(s) of the pelvis.    COMPARISON:  2/13/2019    FINDINGS:  AP view the pelvis demonstrates bilateral hip arthroplasties. Appearances unchanged from the prior exam. No periprosthetic fracture is identified. There are cerclage wires around the femoral component of the left hip arthroplasty. Soft tissues are   grossly unremarkable. Clips project over the pelvis. Degenerative changes are in the lower lumbar spine.     Impression     1.  Status post bilateral hip arthroplasties.    2.  No acute findings identified.     Reading Provider Reading Date   Tiny Fontenot M.D.   Apr 17, 2019   Signing Provider Signing Date Signing Time   Tiny Fontenot M.D. Apr 17, 2019  7:27 PM        Assessment/Plan:     1. Hip swelling, left  - DX-PELVIS-1 OR 2 VIEWS; Future  - US-EXTREMITY NON VASCULAR UNILATERAL LEFT; Future    2. Status post left hip replacement    3. Acute non-recurrent sinusitis, unspecified location  - amoxicillin-clavulanate (AUGMENTIN) 875-125 MG Tab; Take 1 Tab by mouth 2 times a day for 10 days.  Dispense: 20 Tab; Refill: 0    X-ray of pelvis unremarkable for acute process. Left hip without erythema, excessive warmth, or tenderness. She has a follow-up appointment with her surgeon in about 1 month from today and was advised close monitoring until her appointment. However, she continues to verbalize worry about the localized area of swelling in her left hip and is requesting further evaluation. Ultrasound ordered for further evaluation of localized soft tissue swelling.    Patient reporting nasal congestion, sinus congestion and pain, nasal discharge, postnasal drip, sore throat, and productive cough for 2 weeks which is getting worse. Discussed supportive measures including increasing fluids and rest as well as OTC symptom management. Patient is worried about a secondary bacterial  component to her symptoms and is inquiring about an antibiotic at this time. Rx sent electronically for Augmentin which she has tolerated and responded well to in the past.    Patient advised to: Return for 1) Symptoms don't improve or worsen, or go to ER, 2) Follow up with primary care in 7-10 days.    Differential diagnosis, natural history, supportive care, and indications for immediate follow-up discussed. All questions answered. Patient agrees with the plan of care.

## 2019-05-02 ENCOUNTER — HOSPITAL ENCOUNTER (OUTPATIENT)
Dept: PAIN MANAGEMENT | Facility: REHABILITATION | Age: 54
End: 2019-05-02
Attending: PHYSICAL MEDICINE & REHABILITATION
Payer: COMMERCIAL

## 2019-05-02 ENCOUNTER — HOSPITAL ENCOUNTER (OUTPATIENT)
Dept: RADIOLOGY | Facility: REHABILITATION | Age: 54
End: 2019-05-02
Attending: PHYSICAL MEDICINE & REHABILITATION

## 2019-05-02 VITALS
DIASTOLIC BLOOD PRESSURE: 88 MMHG | SYSTOLIC BLOOD PRESSURE: 127 MMHG | RESPIRATION RATE: 17 BRPM | TEMPERATURE: 98.1 F | OXYGEN SATURATION: 98 % | HEIGHT: 63 IN | BODY MASS INDEX: 37.5 KG/M2 | HEART RATE: 105 BPM | WEIGHT: 211.64 LBS

## 2019-05-02 PROCEDURE — 700111 HCHG RX REV CODE 636 W/ 250 OVERRIDE (IP)

## 2019-05-02 PROCEDURE — 64640 INJECTION TREATMENT OF NERVE: CPT

## 2019-05-02 PROCEDURE — 99153 MOD SED SAME PHYS/QHP EA: CPT

## 2019-05-02 PROCEDURE — 64636 DESTROY L/S FACET JNT ADDL: CPT

## 2019-05-02 PROCEDURE — 99152 MOD SED SAME PHYS/QHP 5/>YRS: CPT

## 2019-05-02 PROCEDURE — 64635 DESTROY LUMB/SAC FACET JNT: CPT

## 2019-05-02 RX ORDER — LIDOCAINE HYDROCHLORIDE 10 MG/ML
INJECTION, SOLUTION EPIDURAL; INFILTRATION; INTRACAUDAL; PERINEURAL
Status: COMPLETED
Start: 2019-05-02 | End: 2019-05-02

## 2019-05-02 RX ORDER — ROPIVACAINE HYDROCHLORIDE 5 MG/ML
INJECTION, SOLUTION EPIDURAL; INFILTRATION; PERINEURAL
Status: COMPLETED
Start: 2019-05-02 | End: 2019-05-02

## 2019-05-02 RX ORDER — MIDAZOLAM HYDROCHLORIDE 1 MG/ML
INJECTION INTRAMUSCULAR; INTRAVENOUS
Status: COMPLETED
Start: 2019-05-02 | End: 2019-05-02

## 2019-05-02 RX ADMIN — ROPIVACAINE HYDROCHLORIDE 10 ML: 5 INJECTION, SOLUTION EPIDURAL; INFILTRATION; PERINEURAL at 14:00

## 2019-05-02 RX ADMIN — FENTANYL CITRATE 50 MCG: 50 INJECTION, SOLUTION INTRAMUSCULAR; INTRAVENOUS at 13:53

## 2019-05-02 RX ADMIN — LIDOCAINE HYDROCHLORIDE 30 ML: 10 INJECTION, SOLUTION EPIDURAL; INFILTRATION; INTRACAUDAL; PERINEURAL at 14:00

## 2019-05-02 RX ADMIN — FENTANYL CITRATE 50 MCG: 50 INJECTION, SOLUTION INTRAMUSCULAR; INTRAVENOUS at 14:05

## 2019-05-02 RX ADMIN — MIDAZOLAM HYDROCHLORIDE 1 MG: 1 INJECTION, SOLUTION INTRAMUSCULAR; INTRAVENOUS at 13:52

## 2019-05-02 RX ADMIN — MIDAZOLAM HYDROCHLORIDE 1 MG: 1 INJECTION, SOLUTION INTRAMUSCULAR; INTRAVENOUS at 14:04

## 2019-05-02 NOTE — NON-PROVIDER
1307 PM.  Denied taking any blood thinners and  any anti- inflammatories medications. Home care education and verbal instruction given to patient and verbalized understanding.Patient had a  ( [place] / boyfriend ).Stop bang score # 1 .   Dr. Nguyen made aware and assessed patient. . Hand off reported to PATO Keys RN.

## 2019-05-02 NOTE — NON-PROVIDER
1425 PM . Tolerated fluids well.  Ice pack applied to affected area. Patient able to  move all extremities without difficulty voluntarily and on command. Reviewed home care instructions and understood by patient.

## 2019-05-02 NOTE — NON-PROVIDER
Dr Nguyen made aware of location of metal implants: bilateral hips, right knee and neck regions: okay to to place grounding pad on right flank region

## 2019-05-28 RX ORDER — METHOCARBAMOL 500 MG/1
1000 TABLET, FILM COATED ORAL 4 TIMES DAILY
Qty: 236 TAB | Refills: 4 | Status: SHIPPED | OUTPATIENT
Start: 2019-05-28 | End: 2019-09-26 | Stop reason: SDUPTHER

## 2019-06-04 ENCOUNTER — OFFICE VISIT (OUTPATIENT)
Dept: MEDICAL GROUP | Age: 54
End: 2019-06-04
Payer: COMMERCIAL

## 2019-06-04 VITALS
WEIGHT: 215 LBS | OXYGEN SATURATION: 96 % | DIASTOLIC BLOOD PRESSURE: 76 MMHG | HEIGHT: 63 IN | HEART RATE: 95 BPM | TEMPERATURE: 98.9 F | SYSTOLIC BLOOD PRESSURE: 130 MMHG | BODY MASS INDEX: 38.09 KG/M2

## 2019-06-04 DIAGNOSIS — E66.9 OBESITY (BMI 30-39.9): ICD-10-CM

## 2019-06-04 DIAGNOSIS — R60.9 EDEMA, UNSPECIFIED TYPE: ICD-10-CM

## 2019-06-04 DIAGNOSIS — M16.0 PRIMARY OSTEOARTHRITIS OF BOTH HIPS: ICD-10-CM

## 2019-06-04 DIAGNOSIS — M17.0 PRIMARY OSTEOARTHRITIS OF BOTH KNEES: ICD-10-CM

## 2019-06-04 DIAGNOSIS — Z02.9 ADMINISTRATIVE ENCOUNTER: ICD-10-CM

## 2019-06-04 DIAGNOSIS — I10 ESSENTIAL HYPERTENSION: ICD-10-CM

## 2019-06-04 PROBLEM — Z96.643 S/P HIP REPLACEMENT, BILATERAL: Status: ACTIVE | Noted: 2019-02-13

## 2019-06-04 PROCEDURE — 99214 OFFICE O/P EST MOD 30 MIN: CPT | Performed by: INTERNAL MEDICINE

## 2019-06-04 RX ORDER — PHENTERMINE HYDROCHLORIDE 37.5 MG/1
37.5 CAPSULE ORAL EVERY MORNING
Qty: 90 CAP | Refills: 0 | Status: SHIPPED | OUTPATIENT
Start: 2019-06-04 | End: 2019-12-11 | Stop reason: SDUPTHER

## 2019-06-04 RX ORDER — FUROSEMIDE 40 MG/1
40 TABLET ORAL DAILY
Qty: 90 TAB | Refills: 4 | Status: SHIPPED | OUTPATIENT
Start: 2019-06-04 | End: 2021-12-31

## 2019-06-04 ASSESSMENT — ENCOUNTER SYMPTOMS
MYALGIAS: 0
CONSTITUTIONAL NEGATIVE: 1
RESPIRATORY NEGATIVE: 1
NECK PAIN: 0
FALLS: 0
EYES NEGATIVE: 1
PSYCHIATRIC NEGATIVE: 1
BACK PAIN: 0
GASTROINTESTINAL NEGATIVE: 1
NEUROLOGICAL NEGATIVE: 1
CARDIOVASCULAR NEGATIVE: 1

## 2019-06-04 NOTE — PROGRESS NOTES
Subjective:      Karine Ovalle is a 54 y.o. female who presents with Follow-Up and Medication Refill (phentermine)        HPI    The patient is here for followup of chronic medical problems listed below. The patient is compliant with medications and having no side effects from them. Denies chest pain, abdominal pain, dyspnea, myalgias, or cough.    Administrative encounter- RTC ACCESS/ADA PARATRANSIT ELIGIBILITY  Patient requesting paperwork be filled out for RTC transportation access given her impaired mobility.    Obesity (BMI 30-39.9)  Patient was previously taking phentermine and is requesting a prescription to be placed on it again. BMI is 34.9.    Primary osteoarthritis of both knees- sp right TKR 2015; left TKR tbd 2020- dr levine  Patient had a right knee replacement performed in 2015 by Dr. Levine. She has plans to have a left knee replacement in 2020 also performed by Dr. Levine.    Primary osteoarthritis of both hips- dr levine; left THR done feb 6, 2019; right THR 3/2018  Patient had a right hip replacement performed in March 2018, and a  left hip replacement in February 2019. Both surgeries were performed by Dr. Levine.    Hypertension  Patient is taking Potassium Chloride 20 mEq, which she is tolerating well without side effects. Pressures today are 110/70.     Edema  Patient is taking Lasix for treatment of her edema which is thought to be secondary to water retention. She is tolerating this well without side effects.       Patient Active Problem List   Diagnosis   • History of gastric bypass   • Mild intermittent asthma without complication   • Vitamin B 12 deficiency   • Chronic bilateral low back pain with bilateral sciatica- pain mgt;    spine nv   • Colon cancer screening- needs   • Venous insufficiency   • Uncomplicated opioid dependence (CMS-HCC)- spine nv   • Essential hypertension   • DDD (degenerative disc disease), lumbar   • Primary osteoarthritis of both hips- dr levine; left THR  done feb 6, 2019; right THR 3/2018   • Obesity (BMI 30-39.9)   • DDD (degenerative disc disease), cervical- MOD-SEVERE SPINE NV- dr brennan; fusion and laminectomy C3-T1 12/5/2018 dr brennan   • Encounter for work capability assessment- FMLA PAPERWORK   • Cervical myelopathy (HCC)   • Edema   • Vitamin D deficiency   • Anemia   • Primary insomnia   • Mixed stress and urge urinary incontinence   • S/P hip replacement, bilateral   • Primary osteoarthritis of both knees- sp right TKR 2015; left TKR tbd 2020- dr nowak       Outpatient Medications Prior to Visit   Medication Sig Dispense Refill   • methocarbamol (ROBAXIN) 500 MG Tab Take 2 Tabs by mouth 4 times a day. 236 Tab 4   • oxyCODONE CR (OXYCONTIN) 10 MG Tablet Extended Release 12 hour Abuse-Deterrent Take 10 mg by mouth every 12 hours.     • Esomeprazole Magnesium (NEXIUM 24HR PO) Take 20 mg by mouth 1 time daily as needed (heartburn).     • furosemide (LASIX) 40 MG Tab Take 40 mg by mouth every day. May hold dose if no ankle swelling.     • senna-docusate (PERICOLACE OR SENOKOT S) 8.6-50 MG Tab Take 1 Tab by mouth 2 times a day.     • Potassium Chloride (KLOR-CON PO) Take 20 mEq by mouth every day. May hold if not taking Lasix.     • polyethylene glycol 3350 (MIRALAX) Powder Take 17 g by mouth every day.     • Calcium Carbonate-Vitamin D (CALCIUM 600 + D) 600-400 MG-UNIT Tab Take 1 Tab by mouth 3 times a day.     • Omega-3 Fatty Acids (FISH OIL) 1000 MG Cap capsule Take 1,000 mg by mouth every day.     • Multiple Vitamins-Minerals (ONE-A-DAY WOMENS 50 PLUS PO) Take 1 tablet by mouth every day.     • non-formulary med Take 3 Tabs by mouth 2 Times a Day. Collagen Advanced Formula     • ferrous sulfate 325 (65 Fe) MG tablet Take 1 Tab by mouth every morning with breakfast. 30 Tab 1   • gabapentin (NEURONTIN) 300 MG Cap Take 1 Cap by mouth 3 times a day. 90 Cap 5   • ascorbic acid (VITAMIN C) 500 MG tablet Take 1 Tab by mouth every morning with breakfast. 30 Tab 3   •  cyanocobalamin (VITAMIN B12) 1000 MCG Tab Take 1 Tab by mouth every day. 90 Tab 4   • morphine ER (MS CONTIN) 15 MG Tab CR tablet TAKE 1 TABLET BY MOUTH EVERY 12 HOURS DO NOT DRIVE OR DRINK ALCOHOL WHILE TAKING MAY CAUSE SEDATION  0   • aspirin 81 MG EC tablet Take 1 Tab by mouth 2 times a day. (Patient not taking: Reported on 6/4/2019) 60 Tab 0   • Misc. Devices Misc Adult briefs XXL (FEMALE) FOR URINARY INCONTINENCE- ANY BRAND PER PT PREFERENCE OR INSURANC ALLOWANCE; TO CHANGE  Each 5     No facility-administered medications prior to visit.         No Known Allergies    Review of Systems   Constitutional: Negative.    HENT: Negative.    Eyes: Negative.    Respiratory: Negative.    Cardiovascular: Negative.    Gastrointestinal: Negative.    Genitourinary: Negative.    Musculoskeletal: Positive for joint pain. Negative for back pain, falls, myalgias and neck pain.   Skin: Negative.    Neurological: Negative.    Endo/Heme/Allergies: Negative.    Psychiatric/Behavioral: Negative.    All other systems reviewed and are negative.       Objective:     LMP  (LMP Unknown)     Physical Exam   Constitutional: Oriented to person, place, and time. Appears well-developed. No distress.   Head: Normocephalic and atraumatic.   Right Ear: External ear normal.   Left Ear: External ear normal.   Nose: Nose normal.   Mouth/Throat: Oropharynx is clear and moist. No oropharyngeal exudate.   Eyes: Pupils are equal, round, and reactive to light. Conjunctivae and EOM are normal. Right eye exhibits no discharge. Left eye exhibits no discharge. No scleral icterus.   Neck: Normal range of motion. Neck supple. No JVD present. No tracheal deviation present. No thyromegaly present.   Cardiovascular: Normal rate, regular rhythm, normal heart sounds and intact distal pulses.  Exam reveals no gallop and no friction rub.    No murmur heard.  Pulmonary/Chest: Effort normal. No stridor. No respiratory distress. No wheezing or rales. No  tenderness.   Abdominal: Soft. Bowel sounds are normal. No distension and no mass. There is no tenderness. There is no rebound and no guarding. No hernia.   Musculoskeletal: Normal range of motion No edema or tenderness.   Lymphadenopathy: No cervical adenopathy.   Neurological: Alert and oriented to person, place, and time. Normal reflexes. Normal reflexes. No cranial nerve deficit. Normal muscle tone. Coordination normal.   Skin: Skin is warm and dry. No rash noted. Not diaphoretic. No erythema. No pallor.   Psychiatric: Normal mood and affect. Behavior is normal. Judgment and thought content normal.   Nursing note and vitals reviewed.      Lab Results   Component Value Date/Time    HBA1C 5.0 02/16/2019 05:52 AM    HBA1C 5.9 (H) 12/12/2018 06:00 AM     Lab Results   Component Value Date/Time    SODIUM 139 02/16/2019 05:52 AM    POTASSIUM 3.6 02/16/2019 05:52 AM    CHLORIDE 108 02/16/2019 05:52 AM    CO2 27 02/16/2019 05:52 AM    GLUCOSE 105 (H) 02/16/2019 05:52 AM    BUN 12 02/16/2019 05:52 AM    CREATININE 0.39 (L) 02/16/2019 05:52 AM    CREATININE 0.88 08/14/2010 12:00 AM    BUNCREATRAT 13 08/14/2010 12:00 AM    GLOMRATE >59 08/14/2010 12:00 AM    ALKPHOSPHAT 94 02/16/2019 05:52 AM    ASTSGOT 14 02/16/2019 05:52 AM    ALTSGPT 6 02/16/2019 05:52 AM    TBILIRUBIN 0.3 02/16/2019 05:52 AM     Lab Results   Component Value Date/Time    INR 1.00 02/13/2019 11:44 AM    INR 1.01 12/04/2018 11:15 AM    INR 1.04 05/18/2012 12:20 PM     Lab Results   Component Value Date/Time    CHOLSTRLTOT 168 02/22/2018 05:05 PM    LDL 84 02/22/2018 05:05 PM    HDL 67 02/22/2018 05:05 PM    TRIGLYCERIDE 85 02/22/2018 05:05 PM       No results found for: TESTOSTERONE  Lab Results   Component Value Date/Time    TSH 2.110 08/14/2010 12:00 AM     Lab Results   Component Value Date/Time    FREET4 0.72 05/03/2016 07:32 AM     Lab Results   Component Value Date/Time    URICACID 5.6 08/12/2014 04:49 PM     No components found for: VITB12  Lab  Results   Component Value Date/Time    25HYDROXY 17 (L) 02/15/2019 03:30 AM    25HYDROXY 14 (L) 12/12/2018 06:00 AM          Assessment/Plan:     1. Administrative encounter- RTC ACCESS/ADA PARATRANSIT ELIGIBILITY  - Patient's RTC access paperwork filled out and returned.    2. Obesity (BMI 30-39.9)  - Patient will resume phentermine  diet/exercise/lose 15 lbs.; patient counseled   - phentermine 37.5 MG capsule; Take 1 Cap by mouth every morning for 90 days.  Dispense: 90 Cap; Refill: 0    3. Primary osteoarthritis of both knees- sp right TKR 2015; left TKR tbd 2020- dr levine  - Patient is s/p right TKR and is planning to have a left TKR performed in 2020. No treatment necessary at this time.    4. Primary osteoarthritis of both hips- dr levine; left THR done feb 6, 2019; right THR 3/2018  - Patient has completed bilateral total hip replacements both performed by Dr. Levine. No treatment necessary at this time.    5. Essential hypertension  - Under good control. Continue same regimen of potassium chloride.    6. Edema, unspecified type  - Under control. Continue same regimen of furosemide.  - furosemide (LASIX) 40 MG Tab; Take 1 Tab by mouth every day.  Dispense: 90 Tab; Refill: 4      40 minute face-to-face encounter took place today.  More than half of this time was spent in the coordination of care of the above problems, as well as counseling.     Roberto Carlos IRELAND (Ludy), am scribing for, and in the presence of, Micky Rubin M.D..    Electronically signed by: Roberto Carlos Shepard (Ludy), 6/4/2019    Micky IRELAND M.D., personally performed the services described in this documentation, as scribed by Roberto Carlos Shepard in my presence, and it is both accurate and complete.

## 2019-06-30 DIAGNOSIS — E87.6 HYPOKALEMIA: ICD-10-CM

## 2019-07-01 RX ORDER — POTASSIUM CHLORIDE 20 MEQ/1
TABLET, EXTENDED RELEASE ORAL
Qty: 100 TAB | Refills: 1 | Status: SHIPPED | OUTPATIENT
Start: 2019-07-01 | End: 2020-08-03

## 2019-08-15 ENCOUNTER — OFFICE VISIT (OUTPATIENT)
Dept: MEDICAL GROUP | Age: 54
End: 2019-08-15
Payer: COMMERCIAL

## 2019-08-15 VITALS
WEIGHT: 223.4 LBS | OXYGEN SATURATION: 95 % | SYSTOLIC BLOOD PRESSURE: 110 MMHG | HEART RATE: 104 BPM | BODY MASS INDEX: 39.57 KG/M2 | TEMPERATURE: 99.8 F | DIASTOLIC BLOOD PRESSURE: 70 MMHG

## 2019-08-15 DIAGNOSIS — M25.552 LEFT HIP PAIN: ICD-10-CM

## 2019-08-15 PROCEDURE — 99214 OFFICE O/P EST MOD 30 MIN: CPT | Performed by: INTERNAL MEDICINE

## 2019-08-15 RX ORDER — OXYCODONE AND ACETAMINOPHEN 10; 325 MG/1; MG/1
1 TABLET ORAL EVERY 6 HOURS PRN
Refills: 0 | COMMUNITY
Start: 2019-06-10

## 2019-08-15 RX ORDER — MORPHINE SULFATE 30 MG/1
30 TABLET, FILM COATED, EXTENDED RELEASE ORAL
Refills: 0 | COMMUNITY
Start: 2019-06-10 | End: 2020-01-16

## 2019-08-15 ASSESSMENT — PAIN SCALES - GENERAL: PAINLEVEL: 9=SEVERE PAIN

## 2019-08-15 NOTE — LETTER
August 15, 2019         Patient: Karine Ovalle   YOB: 1965   Date of Visit: 8/15/2019           To Whom it May Concern:    Karine Ovalle was seen in my clinic on 8/15/2019. Please excuse her from work today. She may return to work on 8/16/19.    If you have any questions or concerns, please don't hesitate to call.        Sincerely,           Shannan Rollins M.D.  Electronically Signed

## 2019-08-16 NOTE — PROGRESS NOTES
Subjective:   Karine Ovalle is a 54 y.o. female here today for evaluation and management of:    Left hip pain  Patient of Dr. Rubin's. She presents today with complaints of persistent 8-9/10 left hip pain onset 1.5 months ago. Patient had a total left hip replacement on 2/13/19 by Dr. Levine with [facility]. Since surgery, she complains of recurrent left hip pain as well as a lump to her left hip which she is concerned is a fluid pocket. She has been seen in the past for this hip pain by Dr. Rubin in June as well as at Urgent Care in April 2019. An order was placed for an ultrasound in April after she had x-ray's done which were unremarkable. Patient does not recall order for ultrasound and did not have this done. Per patient, her pain is triggered after doing activities she is not supposed to be doing. 1.5 months ago she states she was trying to get into a truck that she was unable to get into. She tried lifting her leg which triggered her current hip pain. She is using a cane to ambulate. She is requesting referral to see physical therapy and would like to have more imaging done. She had gentle massaging to her hip in the past with complete resolution in symptoms the following day. She has not tried gentle massage herself but has been taking pain medications for her pain as prescribed by her pain specialist after surgery. Prior to taking pain medication her pain is a 8-9/10 but improved after pain medication. Denies nausea, vomiting, chest pain, constipation, diarrhea, or shortness of breath.       Current medicines (including changes today)  Current Outpatient Medications   Medication Sig Dispense Refill   • morphine ER (MS CONTIN) 30 MG Tab CR tablet Take 30 mg by mouth.  0   • oxyCODONE-acetaminophen (PERCOCET-10)  MG Tab Take 1 Tab by mouth.  0   • potassium chloride SA (KDUR) 20 MEQ Tab CR TAKE 1 TABLET BY MOUTH ONCE DAILY 100 Tab 1   • furosemide (LASIX) 40 MG Tab Take 1 Tab by mouth every day. 90 Tab 4    • phentermine 37.5 MG capsule Take 1 Cap by mouth every morning for 90 days. 90 Cap 0   • methocarbamol (ROBAXIN) 500 MG Tab Take 2 Tabs by mouth 4 times a day. 236 Tab 4   • Esomeprazole Magnesium (NEXIUM 24HR PO) Take 20 mg by mouth 1 time daily as needed (heartburn).     • senna-docusate (PERICOLACE OR SENOKOT S) 8.6-50 MG Tab Take 1 Tab by mouth 2 times a day.     • polyethylene glycol 3350 (MIRALAX) Powder Take 17 g by mouth every day.     • Calcium Carbonate-Vitamin D (CALCIUM 600 + D) 600-400 MG-UNIT Tab Take 1 Tab by mouth 3 times a day.     • Omega-3 Fatty Acids (FISH OIL) 1000 MG Cap capsule Take 1,000 mg by mouth every day.     • Multiple Vitamins-Minerals (ONE-A-DAY WOMENS 50 PLUS PO) Take 1 tablet by mouth every day.     • non-formulary med Take 3 Tabs by mouth 2 Times a Day. Collagen Advanced Formula     • ferrous sulfate 325 (65 Fe) MG tablet Take 1 Tab by mouth every morning with breakfast. 30 Tab 1   • aspirin 81 MG EC tablet Take 1 Tab by mouth 2 times a day. 60 Tab 0   • gabapentin (NEURONTIN) 300 MG Cap Take 1 Cap by mouth 3 times a day. 90 Cap 5   • Misc. Devices Misc Adult briefs XXL (FEMALE) FOR URINARY INCONTINENCE- ANY BRAND PER PT PREFERENCE OR INSURANC ALLOWANCE; TO CHANGE  Each 5   • ascorbic acid (VITAMIN C) 500 MG tablet Take 1 Tab by mouth every morning with breakfast. 30 Tab 3   • cyanocobalamin (VITAMIN B12) 1000 MCG Tab Take 1 Tab by mouth every day. 90 Tab 4     No current facility-administered medications for this visit.      She  has a past medical history of Anemia, Arthritis, At risk for falls, Chronic back pain, Dental disorder, Pain (12/04/2018), Pain (02/04/2019), and Urinary incontinence. She also has no past medical history of ASTHMA, Breast cancer (HCC), or Diabetes.    ROS   Positive for left hip pain and lower back pain.  No chest pain, no shortness of breath, no abdominal pain.       Objective:     /70 (BP Location: Right arm, Patient Position: Sitting,  BP Cuff Size: Adult long)   Pulse (!) 104   Temp 37.7 °C (99.8 °F) (Temporal)   Wt 101.3 kg (223 lb 6.4 oz)   SpO2 95%  Body mass index is 39.57 kg/m².     Physical Exam:  General: Alert, oriented and no acute distress.  Eye contact is good, speech goal directed, affect calm  HEENT: conjunctiva non-injected, sclera non-icteric.  Oral mucous membranes pink and moist with no lesions.  Pinna normal.  Lungs: Normal respiratory effort, clear to auscultation bilaterally with good excursion.  CV: regular rate and rhythm. No murmurs.   Abdomen: soft, non distended, nontender, Bowel sound normal.  Ext: no edema, color normal, vascularity normal, temperature normal  Musculoskeletal: Tender on the left hip and palpable swelling on the left hip that patient is concerned about. Firm in consistency on the swelling. Tender on palpation to the swelling.    Assessment and Plan:   The following treatment plan was discussed; Patient will keep follow up appointment with Dr. Rubin on 9/10.      1. Left hip pain  - Patient is here for recurrent left hip pain and concerning of fluid pocket on left hip. Discussed to have left hip x-ray, follow with orthopedist and referral to physical therapy. She is agreeable with this plans.   - Recommended patient to apply ice to the area and try gentle massage.   - She can continue taking pain medication as prescribed by pain specialist.   - Advised not to cross her legs and to sit straight. Try and avoid external rotation to prevent dislocation.   - Advised patient to [facility] if symptoms do not improve.  Referral to [facility] was placed as well.  - DX-HIP-COMPLETE - UNILATERAL 2+ LEFT; Future  - REFERRAL TO PHYSICAL THERAPY Reason for Therapy: Eval/Treat/Report  - REFERRAL TO ORTHOPEDICS    Patient already has a regular follow-up appointment with Dr. Rubin, PCP on 9/10/2019.    Followup: Return in about 26 days (around 9/10/2019), or if symptoms worsen or  fail to improve, for Left hip pain, anemia, hypertension, obesity, vitamin D insufficiency.      Please note that this dictation was created using voice recognition software. I have made every reasonable attempt to correct obvious errors, but I expect that there may have unintended errors in text, spelling, punctuation, or grammar that I did not discover.    I, Kari Goodwin (Ludy), am scribing for, and in the presence of, Shannan Rollins M.D..    Electronically signed by: Kari Goodwin (Ludy), 8/15/2019    I, Shannan Rollins M.D., personally performed the services described in this documentation, as scribed by Kari Goodwin in my presence, and it is both accurate and complete.

## 2019-09-11 ENCOUNTER — OFFICE VISIT (OUTPATIENT)
Dept: MEDICAL GROUP | Age: 54
End: 2019-09-11
Payer: COMMERCIAL

## 2019-09-11 VITALS — BODY MASS INDEX: 39.57 KG/M2 | HEIGHT: 63 IN

## 2019-09-11 DIAGNOSIS — E55.9 VITAMIN D DEFICIENCY: ICD-10-CM

## 2019-09-11 DIAGNOSIS — E66.9 OBESITY (BMI 30-39.9): ICD-10-CM

## 2019-09-11 DIAGNOSIS — Z23 NEED FOR VACCINATION: ICD-10-CM

## 2019-09-11 DIAGNOSIS — D50.0 IRON DEFICIENCY ANEMIA DUE TO CHRONIC BLOOD LOSS: ICD-10-CM

## 2019-09-11 DIAGNOSIS — Z12.39 BREAST CANCER SCREENING: ICD-10-CM

## 2019-09-11 DIAGNOSIS — Z12.11 SCREEN FOR COLON CANCER: ICD-10-CM

## 2019-09-11 DIAGNOSIS — M54.41 CHRONIC BILATERAL LOW BACK PAIN WITH BILATERAL SCIATICA: ICD-10-CM

## 2019-09-11 DIAGNOSIS — Z00.00 HEALTHCARE MAINTENANCE: ICD-10-CM

## 2019-09-11 DIAGNOSIS — Z11.59 NEED FOR HEPATITIS C SCREENING TEST: ICD-10-CM

## 2019-09-11 DIAGNOSIS — M17.0 PRIMARY OSTEOARTHRITIS OF BOTH KNEES: ICD-10-CM

## 2019-09-11 DIAGNOSIS — I10 ESSENTIAL HYPERTENSION: ICD-10-CM

## 2019-09-11 DIAGNOSIS — E53.8 VITAMIN B 12 DEFICIENCY: ICD-10-CM

## 2019-09-11 DIAGNOSIS — M54.42 CHRONIC BILATERAL LOW BACK PAIN WITH BILATERAL SCIATICA: ICD-10-CM

## 2019-09-11 DIAGNOSIS — G89.29 CHRONIC BILATERAL LOW BACK PAIN WITH BILATERAL SCIATICA: ICD-10-CM

## 2019-09-11 PROCEDURE — 90471 IMMUNIZATION ADMIN: CPT | Performed by: INTERNAL MEDICINE

## 2019-09-11 PROCEDURE — 99214 OFFICE O/P EST MOD 30 MIN: CPT | Mod: 25 | Performed by: INTERNAL MEDICINE

## 2019-09-11 PROCEDURE — 90746 HEPB VACCINE 3 DOSE ADULT IM: CPT | Performed by: INTERNAL MEDICINE

## 2019-09-11 RX ORDER — PHENTERMINE HYDROCHLORIDE 37.5 MG/1
37.5 CAPSULE ORAL EVERY MORNING
Qty: 90 CAP | Refills: 0 | Status: SHIPPED | OUTPATIENT
Start: 2019-09-11 | End: 2019-12-10

## 2019-09-11 RX ORDER — GABAPENTIN 600 MG/1
600 TABLET ORAL 3 TIMES DAILY
Qty: 90 TAB | Refills: 3 | Status: SHIPPED | OUTPATIENT
Start: 2019-09-11 | End: 2019-09-26 | Stop reason: SDUPTHER

## 2019-09-11 ASSESSMENT — ENCOUNTER SYMPTOMS
NEUROLOGICAL NEGATIVE: 1
CONSTITUTIONAL NEGATIVE: 1
PSYCHIATRIC NEGATIVE: 1
GASTROINTESTINAL NEGATIVE: 1
BACK PAIN: 1
RESPIRATORY NEGATIVE: 1
CARDIOVASCULAR NEGATIVE: 1
EYES NEGATIVE: 1

## 2019-09-11 NOTE — PROGRESS NOTES
Subjective:      Karine Ovalle is a 54 y.o. female who presents with Medication Management (phentermine refill, and gabapentin dosage question)        HPI    The patient is here for followup of chronic medical problems listed below. The patient is compliant with medications and having no side effects from them. Denies chest pain, abdominal pain, dyspnea, myalgias, or cough.    Obesity (BMI 30-39.9)  Patient was previously taking phentermine and is requesting a prescription to be placed on it again. BMI is 39.5.     Chronic bilateral low back pain with bilateral sciatica  Following left THR surgery February 2019 she has been having low back nerve pain. Patient has been taking 3 gabapentin 300mg twice a day which has not been resolving her pain. She is followed by [facility] for pain management.    Vitamin B 12 deficiency  She is still taking B 12 supplements for history of deficiency. Vitamatin B12 was slightly deficient February 2019 at 251, secondary to gastric bypass surgery.    Vitamin D deficiency  Vitamin D was additionally deficient in February 2019 at 17.    Essential Hypertension  Patient is taking Potassium Chloride 20 mEq, which she is tolerating well without side effects. Blood pressure today within normal limits.    Screen for colon cancer  Patient had recently sent this screening in, however there are no results in Epic.       Patient Active Problem List   Diagnosis   • History of gastric bypass   • Mild intermittent asthma without complication   • Vitamin B 12 deficiency   • Chronic bilateral low back pain with bilateral sciatica- pain mgt;    spine [facility]   • Colon cancer screening- needs   • Venous insufficiency   • Uncomplicated opioid dependence (CMS-HCC)- [facility]   • Essential hypertension   • DDD (degenerative disc disease), lumbar   • Primary osteoarthritis of both hips- dr nowak; left THR done feb 6, 2019; right THR 3/2018   • Obesity (BMI 30-39.9)   • DDD (degenerative disc disease),  cervical- MOD-SEVERE SPINE NV- dr brennan; fusion and laminectomy C3-T1 12/5/2018 dr brennan   • Encounter for work capability assessment- FMLA PAPERWORK   • Left hip pain   • Cervical myelopathy (HCC)   • Edema   • Vitamin D deficiency   • Anemia   • Primary insomnia   • Mixed stress and urge urinary incontinence   • S/P hip replacement, bilateral   • Primary osteoarthritis of both knees- sp right TKR 2015; left TKR tbd 2020- dr nowak   • Administrative encounter- RTC ACCESS/ADA PARATRANSIT ELIGIBILITY       Outpatient Medications Prior to Visit   Medication Sig Dispense Refill   • morphine ER (MS CONTIN) 30 MG Tab CR tablet Take 30 mg by mouth.  0   • oxyCODONE-acetaminophen (PERCOCET-10)  MG Tab Take 1 Tab by mouth.  0   • potassium chloride SA (KDUR) 20 MEQ Tab CR TAKE 1 TABLET BY MOUTH ONCE DAILY 100 Tab 1   • furosemide (LASIX) 40 MG Tab Take 1 Tab by mouth every day. 90 Tab 4   • methocarbamol (ROBAXIN) 500 MG Tab Take 2 Tabs by mouth 4 times a day. 236 Tab 4   • Esomeprazole Magnesium (NEXIUM 24HR PO) Take 20 mg by mouth 1 time daily as needed (heartburn).     • Calcium Carbonate-Vitamin D (CALCIUM 600 + D) 600-400 MG-UNIT Tab Take 1 Tab by mouth 3 times a day.     • Omega-3 Fatty Acids (FISH OIL) 1000 MG Cap capsule Take 1,000 mg by mouth every day.     • Multiple Vitamins-Minerals (ONE-A-DAY WOMENS 50 PLUS PO) Take 1 tablet by mouth every day.     • ferrous sulfate 325 (65 Fe) MG tablet Take 1 Tab by mouth every morning with breakfast. 30 Tab 1   • Misc. Devices Misc Adult briefs XXL (FEMALE) FOR URINARY INCONTINENCE- ANY BRAND PER PT PREFERENCE OR INSURANC ALLOWANCE; TO CHANGE  Each 5   • ascorbic acid (VITAMIN C) 500 MG tablet Take 1 Tab by mouth every morning with breakfast. 30 Tab 3   • cyanocobalamin (VITAMIN B12) 1000 MCG Tab Take 1 Tab by mouth every day. 90 Tab 4   • senna-docusate (PERICOLACE OR SENOKOT S) 8.6-50 MG Tab Take 1 Tab by mouth 2 times a day.     • polyethylene glycol 3350  "(MIRALAX) Powder Take 17 g by mouth every day.     • non-formulary med Take 3 Tabs by mouth 2 Times a Day. Collagen Advanced Formula     • aspirin 81 MG EC tablet Take 1 Tab by mouth 2 times a day. (Patient not taking: Reported on 9/11/2019) 60 Tab 0   • gabapentin (NEURONTIN) 300 MG Cap Take 1 Cap by mouth 3 times a day. 90 Cap 5     No facility-administered medications prior to visit.         No Known Allergies    Review of Systems   Constitutional: Negative.    HENT: Negative.    Eyes: Negative.    Respiratory: Negative.    Cardiovascular: Negative.    Gastrointestinal: Negative.    Genitourinary: Negative.    Musculoskeletal: Positive for back pain.   Skin: Negative.    Neurological: Negative.    Endo/Heme/Allergies: Negative.    Psychiatric/Behavioral: Negative.             Objective:     Ht 1.6 m (5' 3\")   LMP  (LMP Unknown)   BMI 39.57 kg/m²     Physical Exam   Constitutional: Oriented to person, place, and time. Appears well-developed and well-nourished. No distress.   Head: Normocephalic and atraumatic.   Right Ear: External ear normal.   Left Ear: External ear normal.   Nose: Nose normal.   Mouth/Throat: Oropharynx is clear and moist. No oropharyngeal exudate.   Eyes: Pupils are equal, round, and reactive to light. Conjunctivae and EOM are normal. Right eye exhibits no discharge. Left eye exhibits no discharge. No scleral icterus.   Neck: Normal range of motion. Neck supple. No JVD present. No tracheal deviation present. No thyromegaly present.   Cardiovascular: Normal rate, regular rhythm, normal heart sounds and intact distal pulses.  Exam reveals no gallop and no friction rub.    No murmur heard.  Pulmonary/Chest: Effort normal. No stridor. No respiratory distress. No wheezing or rales. No tenderness.   Abdominal: Soft. Bowel sounds are normal. No distension and no mass. There is no tenderness. There is no rebound and no guarding. No hernia.   Musculoskeletal: Normal range of motion No edema or " tenderness.   Lymphadenopathy: No cervical adenopathy.   Neurological: Alert and oriented to person, place, and time. Normal reflexes. Normal reflexes. No cranial nerve deficit. Normal muscle tone. Coordination normal.   Skin: Skin is warm and dry. No rash noted. Not diaphoretic. No erythema. No pallor.   Psychiatric: Normal mood and affect. Behavior is normal. Judgment and thought content normal.   Nursing note and vitals reviewed.      Lab Results   Component Value Date/Time    HBA1C 5.0 02/16/2019 05:52 AM    HBA1C 5.9 (H) 12/12/2018 06:00 AM     Lab Results   Component Value Date/Time    SODIUM 139 02/16/2019 05:52 AM    POTASSIUM 3.6 02/16/2019 05:52 AM    CHLORIDE 108 02/16/2019 05:52 AM    CO2 27 02/16/2019 05:52 AM    GLUCOSE 105 (H) 02/16/2019 05:52 AM    BUN 12 02/16/2019 05:52 AM    CREATININE 0.39 (L) 02/16/2019 05:52 AM    CREATININE 0.88 08/14/2010 12:00 AM    BUNCREATRAT 13 08/14/2010 12:00 AM    GLOMRATE >59 08/14/2010 12:00 AM    ALKPHOSPHAT 94 02/16/2019 05:52 AM    ASTSGOT 14 02/16/2019 05:52 AM    ALTSGPT 6 02/16/2019 05:52 AM    TBILIRUBIN 0.3 02/16/2019 05:52 AM     Lab Results   Component Value Date/Time    INR 1.00 02/13/2019 11:44 AM    INR 1.01 12/04/2018 11:15 AM    INR 1.04 05/18/2012 12:20 PM     Lab Results   Component Value Date/Time    CHOLSTRLTOT 168 02/22/2018 05:05 PM    LDL 84 02/22/2018 05:05 PM    HDL 67 02/22/2018 05:05 PM    TRIGLYCERIDE 85 02/22/2018 05:05 PM       No results found for: TESTOSTERONE  Lab Results   Component Value Date/Time    TSH 2.110 08/14/2010 12:00 AM     Lab Results   Component Value Date/Time    FREET4 0.72 05/03/2016 07:32 AM     Lab Results   Component Value Date/Time    URICACID 5.6 08/12/2014 04:49 PM     No components found for: VITB12  Lab Results   Component Value Date/Time    25HYDROXY 17 (L) 02/15/2019 03:30 AM    25HYDROXY 14 (L) 12/12/2018 06:00 AM          Assessment/Plan:       1. Breast cancer screening  Patient will obtain mammogram as  instructed.    - MA-SCREEN MAMMO W/CAD-BILAT; Future    2. Obesity (BMI 30-39.9)  Patient will be placed on phentermine. diet/exercise/lose 15 lbs.; patient counseled    - phentermine 37.5 MG capsule; Take 1 Cap by mouth every morning for 90 days.  Dispense: 90 Cap; Refill: 0    3. Chronic bilateral low back pain with bilateral sciatica- pain mgt;    spine [facility]  Gabapentin medication doseage increased to 600mg for her back pain.    - gabapentin (NEURONTIN) 600 MG tablet; Take 1 Tab by mouth 3 times a day.  Dispense: 90 Tab; Refill: 3    4. Essential hypertension  Under good control. Continue same regimen. Will obtain labs.    - TSH; Future  - Comp Metabolic Panel; Future  - Lipid Profile; Future    5. Iron deficiency anemia due to chronic blood loss  Patient lab work in February showed anemia with low RBC, hemoglobin and hematocrit. Will continue to evaluate.    - CBC WITH DIFFERENTIAL; Future    6. Vitamin B 12 deficiency  February 2019 showed B12 at 251. She is taking supplements and will continue to monitor.    - VITAMIN B12; Future  - FOLATE; Future    7. Vitamin D deficiency   Vitamin D deficiency from February 2019 at 17. Will evaluate with new labs.    - VITAMIN D,25 HYDROXY; Future    8. Screen for colon cancer  Last cologuard never resulted in Epic, ordered repeat screen for colon cancer.    - COLOGUARD (FIT DNA)    9. Healthcare maintenance  Patient will follow up for next appointment for physical exam and blood labs review.    - TSH; Future  - Comp Metabolic Panel; Future  - Lipid Profile; Future  - CBC WITH DIFFERENTIAL; Future    10. Primary osteoarthritis of both knees- sp right TKR 2015; left TKR tbd 2020- dr levine  Under good control. Followed by Dr. Levine.    11. Need for hepatitis C screening test  Will evaluate for hep C with blood screening.    - Hep C Virus Antibody; Future    12. Need for vaccination  Hep B Vaccine was administered today without adverse event. Received information to obtain  singrix vaccination at pharmacy.    - Zoster Vac Recomb Adjuvanted (SHINGRIX) 50 MCG/0.5ML Recon Susp; 0.5 mL by Intramuscular route Once for 1 dose.  Dispense: 0.5 mL; Refill: 0  - Hepatitis B Vaccine Adult 20+           40 minute face-to-face encounter took place today.  More than half of this time was spent in the coordination of care of the above problems, as well as counseling.     IBryn (Scribe), am scribing for, and in the presence of, Micky Rubin M.D..    Electronically signed by: Bryn Booth (Scribe), 9/11/2019    I, Micky Rubin M.D., personally performed the services described in this documentation, as scribed by Bryn Booth in my presence, and it is both accurate and complete.

## 2019-09-11 NOTE — LETTER
September 11, 2019         Patient: Karine Ovalle   YOB: 1965   Date of Visit: 9/11/2019           To Whom it May Concern:    Karine Ovalle was seen in my clinic on 9/11/2019. She may return to work on 09/12/2019.    If you have any questions or concerns, please don't hesitate to call.        Sincerely,           Micky Rubin M.D.  Electronically Signed

## 2019-09-16 ENCOUNTER — APPOINTMENT (OUTPATIENT)
Dept: PHYSICAL THERAPY | Facility: MEDICAL CENTER | Age: 54
End: 2019-09-16
Attending: INTERNAL MEDICINE
Payer: COMMERCIAL

## 2019-09-18 ENCOUNTER — HOSPITAL ENCOUNTER (OUTPATIENT)
Dept: RADIOLOGY | Facility: MEDICAL CENTER | Age: 54
End: 2019-09-18
Attending: INTERNAL MEDICINE
Payer: COMMERCIAL

## 2019-09-18 ENCOUNTER — APPOINTMENT (OUTPATIENT)
Dept: PHYSICAL THERAPY | Facility: MEDICAL CENTER | Age: 54
End: 2019-09-18
Attending: INTERNAL MEDICINE
Payer: COMMERCIAL

## 2019-09-18 DIAGNOSIS — M25.552 LEFT HIP PAIN: ICD-10-CM

## 2019-09-18 PROCEDURE — 73502 X-RAY EXAM HIP UNI 2-3 VIEWS: CPT | Mod: LT

## 2019-09-26 ENCOUNTER — OFFICE VISIT (OUTPATIENT)
Dept: MEDICAL GROUP | Age: 54
End: 2019-09-26
Payer: COMMERCIAL

## 2019-09-26 ENCOUNTER — HOSPITAL ENCOUNTER (OUTPATIENT)
Dept: LAB | Facility: MEDICAL CENTER | Age: 54
End: 2019-09-26
Attending: INTERNAL MEDICINE
Payer: COMMERCIAL

## 2019-09-26 VITALS
BODY MASS INDEX: 41.39 KG/M2 | TEMPERATURE: 97.6 F | SYSTOLIC BLOOD PRESSURE: 124 MMHG | WEIGHT: 233.6 LBS | HEART RATE: 102 BPM | DIASTOLIC BLOOD PRESSURE: 66 MMHG | OXYGEN SATURATION: 95 % | HEIGHT: 63 IN

## 2019-09-26 DIAGNOSIS — E53.8 VITAMIN B 12 DEFICIENCY: ICD-10-CM

## 2019-09-26 DIAGNOSIS — M54.42 CHRONIC BILATERAL LOW BACK PAIN WITH BILATERAL SCIATICA: ICD-10-CM

## 2019-09-26 DIAGNOSIS — M50.30 DDD (DEGENERATIVE DISC DISEASE), CERVICAL: ICD-10-CM

## 2019-09-26 DIAGNOSIS — Z23 NEED FOR VACCINATION: ICD-10-CM

## 2019-09-26 DIAGNOSIS — J45.20 MILD INTERMITTENT ASTHMA WITHOUT COMPLICATION: ICD-10-CM

## 2019-09-26 DIAGNOSIS — M17.0 PRIMARY OSTEOARTHRITIS OF BOTH KNEES: ICD-10-CM

## 2019-09-26 DIAGNOSIS — E66.9 OBESITY (BMI 30-39.9): ICD-10-CM

## 2019-09-26 DIAGNOSIS — M51.36 DDD (DEGENERATIVE DISC DISEASE), LUMBAR: ICD-10-CM

## 2019-09-26 DIAGNOSIS — E55.9 VITAMIN D DEFICIENCY: ICD-10-CM

## 2019-09-26 DIAGNOSIS — D50.0 IRON DEFICIENCY ANEMIA DUE TO CHRONIC BLOOD LOSS: ICD-10-CM

## 2019-09-26 DIAGNOSIS — M54.41 CHRONIC BILATERAL LOW BACK PAIN WITH BILATERAL SCIATICA: ICD-10-CM

## 2019-09-26 DIAGNOSIS — D50.9 MICROCYTIC ANEMIA: ICD-10-CM

## 2019-09-26 DIAGNOSIS — Z11.59 NEED FOR HEPATITIS C SCREENING TEST: ICD-10-CM

## 2019-09-26 DIAGNOSIS — Z00.00 HEALTHCARE MAINTENANCE: ICD-10-CM

## 2019-09-26 DIAGNOSIS — G89.29 CHRONIC BILATERAL LOW BACK PAIN WITH BILATERAL SCIATICA: ICD-10-CM

## 2019-09-26 DIAGNOSIS — I10 ESSENTIAL HYPERTENSION: ICD-10-CM

## 2019-09-26 PROCEDURE — 99214 OFFICE O/P EST MOD 30 MIN: CPT | Performed by: INTERNAL MEDICINE

## 2019-09-26 RX ORDER — METHOCARBAMOL 500 MG/1
1000 TABLET, FILM COATED ORAL 4 TIMES DAILY
Qty: 236 TAB | Refills: 4 | Status: SHIPPED | OUTPATIENT
Start: 2019-09-26 | End: 2020-06-29 | Stop reason: SDUPTHER

## 2019-09-26 RX ORDER — GABAPENTIN 600 MG/1
600 TABLET ORAL 3 TIMES DAILY
Qty: 90 TAB | Refills: 3 | Status: SHIPPED | OUTPATIENT
Start: 2019-09-26 | End: 2020-10-01

## 2019-09-26 RX ORDER — GABAPENTIN 600 MG/1
600 TABLET ORAL 3 TIMES DAILY
Qty: 90 TAB | Refills: 3 | Status: SHIPPED | OUTPATIENT
Start: 2019-09-26 | End: 2019-09-26 | Stop reason: SDUPTHER

## 2019-09-26 ASSESSMENT — ENCOUNTER SYMPTOMS
RESPIRATORY NEGATIVE: 1
CONSTITUTIONAL NEGATIVE: 1
NEUROLOGICAL NEGATIVE: 1
EYES NEGATIVE: 1
CARDIOVASCULAR NEGATIVE: 1
PSYCHIATRIC NEGATIVE: 1
GASTROINTESTINAL NEGATIVE: 1
BACK PAIN: 1

## 2019-09-26 NOTE — PROGRESS NOTES
Subjective:      Karine Ovalle is a 54 y.o. female who presents with Pain (pain management, meds thrown in trash )        HPI    The patient is here for followup of chronic medical problems listed below. The patient is compliant with medications and having no side effects from them. Denies chest pain, abdominal pain, dyspnea, myalgias, or cough.    Chronic bilateral low back pain with bilateral sciatica- pain mgt;    spine [facility]  Following left THR surgery February 2019 she has been having low back nerve pain. During our last visit we increased to her gabapentin to 600mg TID for her back pain. The patient reports she was recently staying in the [place] and her percocet, oxycodone, and gabapentin were thrown away. She contacted her pain management physician at [facility] who agreed to refill her percocet and oxycodone. She is requesting a refill of her gabapentin. The patient recently fell in the shower and injured her ankle. It was swollen and painful at onset, but this has resolved since then. She denies any other injuries.    Primary osteoarthritis of both knees- sp right TKR 2015; left TKR tbd 2020- dr levine  Patient underwent a right TKR in 2015 and is currently in consultation with Dr. Levine, Orthopedics, or a left TKR to be schedule next year.    Obesity (BMI 30-39.9)  During our last visit I refilled her prescription for phentermine. She denies any side effects. BMI is 41.38.    Mild intermittent asthma without complication  Symptoms are well controlled. No recent flare ups.    Vitamin B 12 deficiency  She is still taking B 12 supplements for history of deficiency. Vitamin B12 was slightly deficient February 2019 at 251, secondary to gastric bypass surgery    Vitamin D deficiency  Vitamin D was additionally deficient in February 2019 at 17.    Essential hypertension  Patient is taking Potassium Chloride 20 mEq, which she is tolerating well without side effects. Blood pressure today is 124/66.    DDD  (degenerative disc disease), cervical- MOD-SEVERE SPINE NV- dr mendoza; fusion and laminectomy C3-T1 12/5/2018 dr mendoza  The patient's symptoms are under control. She continues follow up with Dr. Mendoza. She is requesting a refill of her methocarbamol.    Patient Active Problem List   Diagnosis   • History of gastric bypass   • Mild intermittent asthma without complication   • Vitamin B 12 deficiency   • Chronic bilateral low back pain with bilateral sciatica- pain mgt;    spine nv   • Colon cancer screening- needs   • Venous insufficiency   • Uncomplicated opioid dependence (CMS-HCC)- spine [facility]   • Essential hypertension   • DDD (degenerative disc disease), lumbar   • Primary osteoarthritis of both hips- dr nowak; left THR done feb 6, 2019; right THR 3/2018   • Obesity (BMI 30-39.9)   • DDD (degenerative disc disease), cervical- MOD-SEVERE SPINE NV- dr mendoza; fusion and laminectomy C3-T1 12/5/2018 dr mendoza   • Encounter for work capability assessment- FMLA PAPERWORK   • Left hip pain   • Cervical myelopathy (HCC)   • Edema   • Vitamin D deficiency   • Microcytic anemia- postop THR 2/2019   • Primary insomnia   • Mixed stress and urge urinary incontinence   • S/P hip replacement, bilateral   • Primary osteoarthritis of both knees- sp right TKR 2015; left TKR tbd 2020- dr nowak   • Administrative encounter- RTC ACCESS/ADA PARATRANSIT ELIGIBILITY       Outpatient Medications Prior to Visit   Medication Sig Dispense Refill   • phentermine 37.5 MG capsule Take 1 Cap by mouth every morning for 90 days. 90 Cap 0   • morphine ER (MS CONTIN) 30 MG Tab CR tablet Take 30 mg by mouth.  0   • oxyCODONE-acetaminophen (PERCOCET-10)  MG Tab Take 1 Tab by mouth.  0   • potassium chloride SA (KDUR) 20 MEQ Tab CR TAKE 1 TABLET BY MOUTH ONCE DAILY 100 Tab 1   • furosemide (LASIX) 40 MG Tab Take 1 Tab by mouth every day. 90 Tab 4   • Esomeprazole Magnesium (NEXIUM 24HR PO) Take 20 mg by mouth 1 time daily as needed (heartburn).    "  • senna-docusate (PERICOLACE OR SENOKOT S) 8.6-50 MG Tab Take 1 Tab by mouth 2 times a day.     • polyethylene glycol 3350 (MIRALAX) Powder Take 17 g by mouth every day.     • Calcium Carbonate-Vitamin D (CALCIUM 600 + D) 600-400 MG-UNIT Tab Take 1 Tab by mouth 3 times a day.     • Omega-3 Fatty Acids (FISH OIL) 1000 MG Cap capsule Take 1,000 mg by mouth every day.     • Multiple Vitamins-Minerals (ONE-A-DAY WOMENS 50 PLUS PO) Take 1 tablet by mouth every day.     • non-formulary med Take 3 Tabs by mouth 2 Times a Day. Collagen Advanced Formula     • ferrous sulfate 325 (65 Fe) MG tablet Take 1 Tab by mouth every morning with breakfast. 30 Tab 1   • aspirin 81 MG EC tablet Take 1 Tab by mouth 2 times a day. 60 Tab 0   • Misc. Devices Misc Adult briefs XXL (FEMALE) FOR URINARY INCONTINENCE- ANY BRAND PER PT PREFERENCE OR INSURANC ALLOWANCE; TO CHANGE  Each 5   • ascorbic acid (VITAMIN C) 500 MG tablet Take 1 Tab by mouth every morning with breakfast. 30 Tab 3   • cyanocobalamin (VITAMIN B12) 1000 MCG Tab Take 1 Tab by mouth every day. 90 Tab 4   • gabapentin (NEURONTIN) 600 MG tablet Take 1 Tab by mouth 3 times a day. 90 Tab 3   • methocarbamol (ROBAXIN) 500 MG Tab Take 2 Tabs by mouth 4 times a day. 236 Tab 4     No facility-administered medications prior to visit.         No Known Allergies    Review of Systems   Constitutional: Negative.    HENT: Negative.    Eyes: Negative.    Respiratory: Negative.    Cardiovascular: Negative.    Gastrointestinal: Negative.    Genitourinary: Negative.    Musculoskeletal: Positive for back pain.        Positive for ankle pain and swelling (resolved)   Skin: Negative.    Neurological: Negative.    Endo/Heme/Allergies: Negative.    Psychiatric/Behavioral: Negative.    All other systems reviewed and are negative.       Objective:     /66   Pulse (!) 102   Temp 36.4 °C (97.6 °F) (Temporal)   Ht 1.6 m (5' 3\")   Wt 106 kg (233 lb 9.6 oz)   LMP  (LMP Unknown)   SpO2 " 95%   BMI 41.38 kg/m²     Physical Exam   Constitutional: Oriented to person, place, and time. Appears well-developed and well-nourished. No distress. Ambulates with a cane.  Head: Normocephalic and atraumatic.   Right Ear: External ear normal.   Left Ear: External ear normal.   Nose: Nose normal.   Mouth/Throat: Oropharynx is clear and moist. No oropharyngeal exudate.   Eyes: Pupils are equal, round, and reactive to light. Conjunctivae and EOM are normal. Right eye exhibits no discharge. Left eye exhibits no discharge. No scleral icterus.   Neck: Normal range of motion. Neck supple. No JVD present. No tracheal deviation present. No thyromegaly present.   Cardiovascular: Normal rate, regular rhythm, normal heart sounds and intact distal pulses.  Exam reveals no gallop and no friction rub.    No murmur heard.  Pulmonary/Chest: Effort normal. No stridor. No respiratory distress. No wheezing or rales. No tenderness.   Musculoskeletal: Normal range of motion No edema or tenderness.   Lymphadenopathy: No cervical adenopathy.   Neurological: Alert and oriented to person, place, and time. Normal reflexes. Normal reflexes. No cranial nerve deficit. Normal muscle tone. Coordination normal.   Skin: Skin is warm and dry. No rash noted. Not diaphoretic. No erythema. No pallor.   Psychiatric: Normal mood and affect. Behavior is normal. Judgment and thought content normal.   Nursing note and vitals reviewed.      Lab Results   Component Value Date/Time    HBA1C 5.0 02/16/2019 05:52 AM    HBA1C 5.9 (H) 12/12/2018 06:00 AM     Lab Results   Component Value Date/Time    SODIUM 139 02/16/2019 05:52 AM    POTASSIUM 3.6 02/16/2019 05:52 AM    CHLORIDE 108 02/16/2019 05:52 AM    CO2 27 02/16/2019 05:52 AM    GLUCOSE 105 (H) 02/16/2019 05:52 AM    BUN 12 02/16/2019 05:52 AM    CREATININE 0.39 (L) 02/16/2019 05:52 AM    CREATININE 0.88 08/14/2010 12:00 AM    BUNCREATRAT 13 08/14/2010 12:00 AM    GLOMRATE >59 08/14/2010 12:00 AM     ALKPHOSPHAT 94 02/16/2019 05:52 AM    ASTSGOT 14 02/16/2019 05:52 AM    ALTSGPT 6 02/16/2019 05:52 AM    TBILIRUBIN 0.3 02/16/2019 05:52 AM     Lab Results   Component Value Date/Time    INR 1.00 02/13/2019 11:44 AM    INR 1.01 12/04/2018 11:15 AM    INR 1.04 05/18/2012 12:20 PM     Lab Results   Component Value Date/Time    CHOLSTRLTOT 168 02/22/2018 05:05 PM    LDL 84 02/22/2018 05:05 PM    HDL 67 02/22/2018 05:05 PM    TRIGLYCERIDE 85 02/22/2018 05:05 PM       No results found for: TESTOSTERONE  Lab Results   Component Value Date/Time    TSH 2.110 08/14/2010 12:00 AM     Lab Results   Component Value Date/Time    FREET4 0.72 05/03/2016 07:32 AM     Lab Results   Component Value Date/Time    URICACID 5.6 08/12/2014 04:49 PM     No components found for: VITB12  Lab Results   Component Value Date/Time    25HYDROXY 17 (L) 02/15/2019 03:30 AM    25HYDROXY 14 (L) 12/12/2018 06:00 AM          Assessment/Plan:     1. Chronic bilateral low back pain with bilateral sciatica- pain mgt;    spine nv  Patient is provided with a refill of her gabapentin. Under good control. Continue same regimen.  - gabapentin (NEURONTIN) 600 MG tablet; Take 1 Tab by mouth 3 times a day.  Dispense: 90 Tab; Refill: 3    2. Primary osteoarthritis of both knees- sp right TKR 2015; left TKR tbd 2020- dr nowak  Pain under good control. Continue follow up with Orthopedics and pain management. Follow up for left TKR as scheduled.    3. Obesity (BMI 30-39.9)  Under good control. Continue same phentermine regimen.  diet/exercise/lose 15 lbs.; patient counseled    4. Mild intermittent asthma without complication  Under good control. Continue same regimen.    5. Essential hypertension  Under good control. Continue same regimen.    6. Vitamin D deficiency  Vitamin D was deficient in February 2019 at 17. Recheck before next visit.    7. Vitamin B 12 deficiency  Vitamin B12 was slightly deficient February 2019 at 251, secondary to gastric bypass  surgery.    8. DDD (degenerative disc disease), lumbar  Under good control. Continue same regimen. Provided methocarbamol refill.  - methocarbamol (ROBAXIN) 500 MG Tab; Take 2 Tabs by mouth 4 times a day.  Dispense: 236 Tab; Refill: 4    9. DDD (degenerative disc disease), cervical- MOD-SEVERE SPINE NV- dr brennan; fusion and laminectomy C3-T1 12/5/2018 dr brennan  Under good control. Continue same regimen. Provided methocarbamol refill.  - methocarbamol (ROBAXIN) 500 MG Tab; Take 2 Tabs by mouth 4 times a day.  Dispense: 236 Tab; Refill: 4    10. Microcytic anemia  Stable. Continue to monitor.    11. Need for vaccination  Health Maintenance reviewed. Patient is due for her influenza vaccine and Shingrex vaccine. She refused her influenza and will receive her Shingrex.  - Zoster Vac Recomb Adjuvanted (SHINGRIX) 50 MCG/0.5ML Recon Susp; 0.5 mL by Intramuscular route Once for 1 dose.  Dispense: 0.5 mL; Refill: 0        30 minute face-to-face encounter took place today.  More than half of this time was spent in the coordination of care of the above problems, as well as counseling.     IYakov (Scribe), am scribing for, and in the presence of, Micky Rubin M.D..    Electronically signed by: Yakov Welch (Scribe), 9/26/2019    Micky IRELAND M.D., personally performed the services described in this documentation, as scribed by Yakov Welch in my presence, and it is both accurate and complete.

## 2019-10-07 ENCOUNTER — PHYSICAL THERAPY (OUTPATIENT)
Dept: PHYSICAL THERAPY | Facility: MEDICAL CENTER | Age: 54
End: 2019-10-07
Attending: INTERNAL MEDICINE
Payer: COMMERCIAL

## 2019-10-07 DIAGNOSIS — M25.552 LEFT HIP PAIN: ICD-10-CM

## 2019-10-07 PROCEDURE — 97161 PT EVAL LOW COMPLEX 20 MIN: CPT

## 2019-10-07 ASSESSMENT — ENCOUNTER SYMPTOMS
QUALITY: RADIATING
PAIN SCALE AT LOWEST: 7
QUALITY: ACHING
QUALITY: STABBING
PAIN TIMING: CONSTANT
PAIN SCALE: 10

## 2019-10-07 NOTE — OP THERAPY EVALUATION
Outpatient Physical Therapy  INITIAL EVALUATION    [facility] Outpatient Physical Therapy  [address]  [place] [address]  Phone:  [phone]  Fax:  [phone]    Date of Evaluation: 10/07/2019    Patient: Karine Ovalle  YOB: 1965  MRN: [id number]     Referring Provider: [address]  [place] [address]   Referring Diagnosis Pain in left hip [M25.552]     Time Calculation  Start time: 415  Stop time: 515 Time Calculation (min): 60 minutes       Physical Therapy Occurrence Codes    Date of onset of impairment:  19   Date physical therapy care plan established or reviewed:  10/7/19   Date physical therapy treatment started:  10/7/19          Chief Complaint: Hip Injury and Hip Problem    Visit Diagnoses     ICD-10-CM   1. Left hip pain M25.552         Subjective   History of Present Illness:     Date of onset:  2019    Date of surgery:  2/15/2019    History of chief complaint:  Pt had a fall on  and then on 2/15 had left VILMA. Pt had some rehab and home health. She returned to work in april. She hasn't done much with her HEP. She was supposed to start outpatient therapy in april but went back to work and is now just coming in for PT>  Pt has been having edema in her legs and using sequential pump at home. Pt needs left knee replaced but needs to wait awhile until she gets stronger. Pt has significant edema in both LE's.     Hx of right TKA, c3-t1 fusion done 2018.    Pain:     Current pain rating:  10    At best pain ratin    Location:  General diffuse left lateral hip aching.     Quality:  Aching, stabbing and radiating    Pain timing:  Constant    Aggravating factors:  Standing and walking > 10 minutes. Getting in and out of a chair.     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     Social Support:     Lives in:  Apartment    Lives with:   "Significant other    Hand Dominance:     Hand dominance:  Right    Treatments:     Treatments to date:  Rehab hospital and Home health care.     Patient Goals:     Patient goals for therapy:  Strengthen UE's and LE's. Decrease edema in LE's.       Past Medical History:   Diagnosis Date   • Anemia    • Arthritis     osteo/hips and back   • At risk for falls    • Chronic back pain    • Dental disorder     lower denture   • Pain 12/04/2018    \"ALL OVER\", 10/10   • Pain 02/04/2019    arthritic pain   • Urinary incontinence     using pads     Past Surgical History:   Procedure Laterality Date   • HIP ARTHROPLASTY TOTAL Left 2/13/2019    Procedure: HIP ARTHROPLASTY TOTAL, Cabling of intra-operative calcar fracture;  Surgeon: Bryon Levine M.D.;  Location: [facility];  Service: Orthopedics   • CERVICAL FUSION POSTERIOR  12/7/2018    Procedure: CERVICAL FUSION POSTERIOR- STAGE #2 C3-5 AND C3-T1;  Surgeon: Emre Mendoza III, M.D.;  Location: [facility];  Service: Neurosurgery   • CERVICAL LAMINECTOMY POSTERIOR  12/7/2018    Procedure: CERVICAL LAMINECTOMY POSTERIOR C2-T1;  Surgeon: Emre Mendoza III, M.D.;  Location: [facility];  Service: Neurosurgery   • CERVICAL DISK AND FUSION ANTERIOR  12/5/2018    Procedure: CERVICAL DISK AND FUSION ANTERIOR-STAGE #1 C4-5;  Surgeon: Emre Mendoza III, M.D.;  Location: [facility];  Service: Neurosurgery   • CORPECTOMY  12/5/2018    Procedure: CORPECTOMY;  Surgeon: Emre Mendoza III, M.D.;  Location: [facility];  Service: Neurosurgery   • HIP ARTHROPLASTY TOTAL Right 3/20/2018    Procedure: HIP ARTHROPLASTY TOTAL;  Surgeon: Bryon Levine M.D.;  Location: [facility];  Service: Orthopedics   • KNEE ARTHROPLASTY TOTAL Right 10/20/2015    Procedure: KNEE ARTHROPLASTY TOTAL;  Surgeon: Bryon Levine M.D.;  Location: [facility];  Service:    • APPENDECTOMY LAPAROSCOPIC  3/21/2013    Performed by " "Dali Queen M.D. at SURGERY [facility] ORS   • DEBRIDEMENT  5/17/2012    Performed by BRADLEY DYKES at SURGERY [facility] ORS   • PLASTIC SURGERY  2004    excess skin on arms and legs removed   • MAMMOPLASTY AUGMENTATION Bilateral 2004    breast implants and \"tummy tuck\"   • GASTRIC BYPASS LAPAROSCOPIC  2002    x 2   • ABDOMINAL EXPLORATION         Precautions:       Objective   Observation and functional movement:  Pt is ambulating with single point cane and antalgic gait. She has significant edema in both LE\"s.     Range of motion and strength:    pt's mobility is compromised by weight of her LE's. MMT: grossly 3-/5 in LE's with pain c/o on left side.     Sensation and reflexes:     Sensation is intact.      Reflexes are normal and symmetrical.    Palpation and joint mobility:     Pitting edema bilateral LE's.     Special tests:      Pt unable to stand on one leg and balance secondary to pain and weakness.             Therapeutic Exercises (CPT 97548):     1. Pt started on elevated sit to stand, supine marching, hip abduction in standing.       Therapeutic Exercise Summary: Access Code: [email or web address]   URL: [email or web address]   Date: 10/07/2019   Prepared by: Jacqueline Hansen     Exercises  · Supine March - 10 reps - 3 sets - 1x daily - 7x weekly  · Sit to Stand - 10 reps - 3 sets - 1x daily - 7x weekly  · Standing Hip Abduction with Counter Support - 10 reps - 3 sets - 1x daily - 7x weekly        Time-based treatments/modalities:          Assessment, Response and Plan:   Impairments: abnormal ADL function, abnormal gait, impaired physical strength, lacks appropriate home exercise program and pain with function    Assessment details:  Ms. Ovalle is a 54 year old female with hx of left VILMA on 2-15-19 with residual pain and weakness that is persisting. Pt has not been compliant with HEP and she's limited secondary to extreme edema in her LE's. Pt would benefit from lymph edema treatment. We will try some " PT to see if we can improve her mobility and strength with edema present.   Barriers to therapy:  Comorbidities  Prognosis: fair    Goals:   Short Term Goals:   Pt will be compliant with daily HEP.  Pt will increase the time she is using pump at home for edema.  Pt will increase her daily steps.   Short term goal time span:  2-4 weeks      Long Term Goals:    Pt will increase LE strength to 4/5.  Pt will be able to stand and walk for 10-20 minutes.   Pt will be able to get out of a normal chair without UE help.   Long term goal time span:  6-8 weeks    Plan:   Therapy options:  Physical therapy treatment to continue  Planned therapy interventions:  Gait Training (CPT 02135), Manual Therapy (CPT 02869), Neuromuscular Re-education (CPT 36380) and Therapeutic Exercise (CPT 98337)  Frequency:  2x week  Duration in weeks:  8  Discussed with:  Patient        Referring provider co-signature:  I have reviewed this plan of care and my co-signature certifies the need for services.  Certification Dates:   From 10/07/19     To 12-7-19    Physician Signature: ________________________________ Date: ______________

## 2019-10-16 ENCOUNTER — TELEPHONE (OUTPATIENT)
Dept: PHYSICAL THERAPY | Facility: MEDICAL CENTER | Age: 54
End: 2019-10-16

## 2019-10-16 ENCOUNTER — PHYSICAL THERAPY (OUTPATIENT)
Dept: PHYSICAL THERAPY | Facility: MEDICAL CENTER | Age: 54
End: 2019-10-16
Attending: INTERNAL MEDICINE
Payer: COMMERCIAL

## 2019-10-16 DIAGNOSIS — M25.552 LEFT HIP PAIN: ICD-10-CM

## 2019-10-16 PROCEDURE — 97110 THERAPEUTIC EXERCISES: CPT

## 2019-10-16 NOTE — OP THERAPY DISCHARGE SUMMARY
Dr. Rubin,   I think Karine will be somewhat limited with PT because of her LE edema. I think she would benefit from Lymphedema treatment. We don't have any specialist at [facility] that does that, so she may have to go to [place] or [facility].

## 2019-10-16 NOTE — OP THERAPY DAILY TREATMENT
Outpatient Physical Therapy  DAILY TREATMENT     [facility] Outpatient Physical Therapy  [address]  [place] [address]  Phone:  [phone]  Fax:  [phone]    Date: 10/16/2019    Patient: Karine Ovalle  YOB: 1965  MRN: [id number]     Time Calculation  Start time: 0415  Stop time: 0450 Time Calculation (min): 35 minutes       Chief Complaint: hip pain  Visit #: 2    SUBJECTIVE:  Pt has been using her sequential pumps for more than one hour.     OBJECTIVE:            Therapeutic Exercises (CPT 98939):     1. Nustep L2 , 12 minutes    2. Sit to stand     3. Hip abduction in standing in parallel bars    4. Calf stretching on slant board    5. Seated hip flexion and knee extension      Time-based treatments/modalities:  Therapeutic exercise minutes (CPT 04512): 35 minutes           ASSESSMENT:   Pt limited with ther ex and needs cuing for good form.     PLAN/RECOMMENDATIONS:   Plan for treatment: therapy treatment to continue next visit.  Planned interventions for next visit: gait training (CPT 10765), massage (CPT 88221) and therapeutic exercise (CPT 46743).

## 2019-10-18 ENCOUNTER — APPOINTMENT (OUTPATIENT)
Dept: PHYSICAL THERAPY | Facility: MEDICAL CENTER | Age: 54
End: 2019-10-18
Attending: INTERNAL MEDICINE
Payer: COMMERCIAL

## 2019-10-22 ENCOUNTER — APPOINTMENT (OUTPATIENT)
Dept: PHYSICAL THERAPY | Facility: MEDICAL CENTER | Age: 54
End: 2019-10-22
Attending: INTERNAL MEDICINE
Payer: COMMERCIAL

## 2019-10-25 ENCOUNTER — APPOINTMENT (OUTPATIENT)
Dept: PHYSICAL THERAPY | Facility: MEDICAL CENTER | Age: 54
End: 2019-10-25
Attending: INTERNAL MEDICINE
Payer: COMMERCIAL

## 2019-10-29 ENCOUNTER — APPOINTMENT (OUTPATIENT)
Dept: PHYSICAL THERAPY | Facility: MEDICAL CENTER | Age: 54
End: 2019-10-29
Attending: INTERNAL MEDICINE
Payer: COMMERCIAL

## 2019-11-01 ENCOUNTER — APPOINTMENT (OUTPATIENT)
Dept: PHYSICAL THERAPY | Facility: MEDICAL CENTER | Age: 54
End: 2019-11-01
Attending: INTERNAL MEDICINE
Payer: COMMERCIAL

## 2019-11-27 ENCOUNTER — HOSPITAL ENCOUNTER (OUTPATIENT)
Dept: RADIOLOGY | Facility: MEDICAL CENTER | Age: 54
End: 2019-11-27
Attending: NEUROLOGICAL SURGERY
Payer: COMMERCIAL

## 2019-11-27 DIAGNOSIS — M54.2 CERVICALGIA: ICD-10-CM

## 2019-11-27 PROCEDURE — 72050 X-RAY EXAM NECK SPINE 4/5VWS: CPT

## 2019-12-03 ENCOUNTER — HOSPITAL ENCOUNTER (OUTPATIENT)
Dept: RADIOLOGY | Facility: MEDICAL CENTER | Age: 54
End: 2019-12-03
Attending: NEUROLOGICAL SURGERY
Payer: COMMERCIAL

## 2019-12-03 DIAGNOSIS — M54.2 CERVICALGIA: ICD-10-CM

## 2019-12-03 PROCEDURE — 72125 CT NECK SPINE W/O DYE: CPT

## 2019-12-11 ENCOUNTER — OFFICE VISIT (OUTPATIENT)
Dept: MEDICAL GROUP | Age: 54
End: 2019-12-11
Payer: COMMERCIAL

## 2019-12-11 VITALS
SYSTOLIC BLOOD PRESSURE: 118 MMHG | HEART RATE: 80 BPM | OXYGEN SATURATION: 96 % | TEMPERATURE: 98.9 F | DIASTOLIC BLOOD PRESSURE: 76 MMHG | WEIGHT: 240.8 LBS | BODY MASS INDEX: 42.66 KG/M2 | HEIGHT: 63 IN

## 2019-12-11 DIAGNOSIS — E66.9 OBESITY (BMI 30-39.9): ICD-10-CM

## 2019-12-11 DIAGNOSIS — I10 ESSENTIAL HYPERTENSION: ICD-10-CM

## 2019-12-11 DIAGNOSIS — M54.41 CHRONIC BILATERAL LOW BACK PAIN WITH BILATERAL SCIATICA: ICD-10-CM

## 2019-12-11 DIAGNOSIS — Z23 NEED FOR VACCINATION: ICD-10-CM

## 2019-12-11 DIAGNOSIS — M54.42 CHRONIC BILATERAL LOW BACK PAIN WITH BILATERAL SCIATICA: ICD-10-CM

## 2019-12-11 DIAGNOSIS — G89.29 CHRONIC BILATERAL LOW BACK PAIN WITH BILATERAL SCIATICA: ICD-10-CM

## 2019-12-11 DIAGNOSIS — D50.9 MICROCYTIC ANEMIA: ICD-10-CM

## 2019-12-11 DIAGNOSIS — M17.0 PRIMARY OSTEOARTHRITIS OF BOTH KNEES: ICD-10-CM

## 2019-12-11 DIAGNOSIS — R73.01 IFG (IMPAIRED FASTING GLUCOSE): ICD-10-CM

## 2019-12-11 DIAGNOSIS — J45.20 MILD INTERMITTENT ASTHMA WITHOUT COMPLICATION: ICD-10-CM

## 2019-12-11 DIAGNOSIS — Z11.59 NEED FOR HEPATITIS C SCREENING TEST: ICD-10-CM

## 2019-12-11 DIAGNOSIS — Z00.00 HEALTHCARE MAINTENANCE: ICD-10-CM

## 2019-12-11 PROCEDURE — 90472 IMMUNIZATION ADMIN EACH ADD: CPT | Performed by: INTERNAL MEDICINE

## 2019-12-11 PROCEDURE — 90746 HEPB VACCINE 3 DOSE ADULT IM: CPT | Performed by: INTERNAL MEDICINE

## 2019-12-11 PROCEDURE — 90471 IMMUNIZATION ADMIN: CPT | Performed by: INTERNAL MEDICINE

## 2019-12-11 PROCEDURE — 90686 IIV4 VACC NO PRSV 0.5 ML IM: CPT | Performed by: INTERNAL MEDICINE

## 2019-12-11 PROCEDURE — 99214 OFFICE O/P EST MOD 30 MIN: CPT | Mod: 25 | Performed by: INTERNAL MEDICINE

## 2019-12-11 RX ORDER — PHENTERMINE HYDROCHLORIDE 37.5 MG/1
37.5 CAPSULE ORAL EVERY MORNING
Qty: 90 CAP | Refills: 1 | Status: SHIPPED | OUTPATIENT
Start: 2019-12-11 | End: 2020-03-10

## 2019-12-11 ASSESSMENT — ENCOUNTER SYMPTOMS
CARDIOVASCULAR NEGATIVE: 1
GASTROINTESTINAL NEGATIVE: 1
CONSTITUTIONAL NEGATIVE: 1
MUSCULOSKELETAL NEGATIVE: 1
RESPIRATORY NEGATIVE: 1
EYES NEGATIVE: 1
NEUROLOGICAL NEGATIVE: 1
PSYCHIATRIC NEGATIVE: 1

## 2019-12-12 NOTE — PROGRESS NOTES
Subjective:      Karine Ovalle is a 54 y.o. female who presents with Weight Check (Phentermine med follow up)        HPI    The patient is here for followup of chronic medical problems listed below. The patient is compliant with medications and having no side effects from them. Denies chest pain, abdominal pain, dyspnea, myalgias, or cough.    1. Obesity (BMI 30-39.9)  The patient is currently taking phentermine 37.5mg once daily. She states that she was losing weight, but has recently gained weight. She attributes this to retaining water.     2. Essential hypertension  Patient has a history of chronic hypertension and is not currently taking medication. No medication side effects were reported. She reportedly monitors her blood pressure regularly at home. Blood pressure is stable today at 118/76.  She reports following a low sodium diet and denies any related complaints including chronic cough, chest pain, headaches or dizziness.     3. Microcytic anemia- postop THR 2/2019  The patient is currently being followed for postoperative microcytic anemia.    4. Mild intermittent asthma without complication  The patient is currently being followed for mild intermittent asthma without complication. She is not currently prescribed medication.     5. Primary osteoarthritis of both knees- sp right TKR 2015; left TKR tbd 2020- dr nowak  The patient states that she is going to have to delay her surgery until she has more medical leave time for work. She is considering this coming summer.     6. Chronic bilateral low back pain with bilateral sciatica- pain mgt;    spine nv  The patient states that she recently had a nerve study and Spine [facility]. She states that she had a C5 pinched nerve. Currently she is taking Percocet 10-325mg, MS Contin 30mg tab, Robaxin 500mg BID, and Neurontin 600mg.     7. Obesity (BMI 30-39.9)- PHENTERMINE RX  The patient is currently taking phentermine 37.5mg once daily. She states that she was  losing weight, but has recently gained weight. She attributes this to retaining water.     8. Need for hepatitis C screening test  The patient is within the age range for recommended hepatitis C screening.     9. Need for vaccination  The patient is due for the Flu and Hep B vaccines. She states that she would like to recieve them in the clinic today.     10. IFG (impaired fasting glucose)  Chronic history of impaired fasting glucose. Glucose is currently managed via diet modification and exercise.     11. Healthcare maintenance  The patient is in need of regular healthcare screening and maintenance.       Patient Active Problem List   Diagnosis   • History of gastric bypass   • Mild intermittent asthma without complication   • Vitamin B 12 deficiency   • Chronic bilateral low back pain with bilateral sciatica- pain mgt;    spine nv   • Colon cancer screening- needs   • Venous insufficiency   • Uncomplicated opioid dependence (CMS-HCC)- spine [place]   • Essential hypertension   • DDD (degenerative disc disease), lumbar   • Primary osteoarthritis of both hips- dr nowak; left THR done feb 6, 2019; right THR 3/2018   • Obesity (BMI 30-39.9)   • DDD (degenerative disc disease), cervical- MOD-SEVERE SPINE [place]- dr brennan; fusion and laminectomy C3-T1 12/5/2018 dr brennan   • 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 90 Tab 3   • methocarbamol (ROBAXIN) 500 MG Tab Take 2 Tabs by mouth 4  times a day. 236 Tab 4   • morphine ER (MS CONTIN) 30 MG Tab CR tablet Take 30 mg by mouth.  0   • oxyCODONE-acetaminophen (PERCOCET-10)  MG Tab Take 1 Tab by mouth.  0   • potassium chloride SA (KDUR) 20 MEQ Tab CR TAKE 1 TABLET BY MOUTH ONCE DAILY 100 Tab 1   • furosemide (LASIX) 40 MG Tab Take 1 Tab by mouth every day. 90 Tab 4   • Esomeprazole Magnesium (NEXIUM 24HR PO) Take 20 mg by mouth 1 time daily as needed (heartburn).     • senna-docusate (PERICOLACE OR SENOKOT S) 8.6-50 MG Tab Take 1 Tab by mouth 2 times a day.     • polyethylene glycol 3350 (MIRALAX) Powder Take 17 g by mouth every day.     • Calcium Carbonate-Vitamin D (CALCIUM 600 + D) 600-400 MG-UNIT Tab Take 1 Tab by mouth 3 times a day.     • Omega-3 Fatty Acids (FISH OIL) 1000 MG Cap capsule Take 1,000 mg by mouth every day.     • Multiple Vitamins-Minerals (ONE-A-DAY WOMENS 50 PLUS PO) Take 1 tablet by mouth every day.     • non-formulary med Take 3 Tabs by mouth 2 Times a Day. Collagen Advanced Formula     • ferrous sulfate 325 (65 Fe) MG tablet Take 1 Tab by mouth every morning with breakfast. 30 Tab 1   • aspirin 81 MG EC tablet Take 1 Tab by mouth 2 times a day. 60 Tab 0   • Misc. Devices Misc Adult briefs XXL (FEMALE) FOR URINARY INCONTINENCE- ANY BRAND PER PT PREFERENCE OR INSURANC ALLOWANCE; TO CHANGE  Each 5   • cyanocobalamin (VITAMIN B12) 1000 MCG Tab Take 1 Tab by mouth every day. 90 Tab 4   • ascorbic acid (VITAMIN C) 500 MG tablet Take 1 Tab by mouth every morning with breakfast. 30 Tab 3     No facility-administered medications prior to visit.         No Known Allergies    Review of Systems   Constitutional: Negative.    HENT: Negative.    Eyes: Negative.    Respiratory: Negative.    Cardiovascular: Negative.    Gastrointestinal: Negative.    Genitourinary: Negative.    Musculoskeletal: Negative.    Skin: Negative.    Neurological: Negative.    Endo/Heme/Allergies: Negative.    Psychiatric/Behavioral: Negative.    All  "other systems reviewed and are negative.           Objective:     /76 (BP Location: Left arm, Patient Position: Sitting, BP Cuff Size: Adult)   Pulse 80   Temp 37.2 °C (98.9 °F) (Temporal)   Ht 1.6 m (5' 3\")   Wt 109.2 kg (240 lb 12.8 oz)   LMP  (LMP Unknown)   SpO2 96%   Breastfeeding? No   BMI 42.66 kg/m²     Physical Exam   Constitutional: Oriented to person, place, and time. Appears well-developed and well-nourished. No distress.   Head: Normocephalic and atraumatic.   Right Ear: External ear normal.   Left Ear: External ear normal.   Nose: Nose normal.   Mouth/Throat: Oropharynx is clear and moist. No oropharyngeal exudate.   Eyes: Pupils are equal, round, and reactive to light. Conjunctivae and EOM are normal. Right eye exhibits no discharge. Left eye exhibits no discharge. No scleral icterus.   Neck: Normal range of motion. Neck supple. No JVD present. No tracheal deviation present. No thyromegaly present.   Cardiovascular: Normal rate, regular rhythm, normal heart sounds and intact distal pulses.  Exam reveals no gallop and no friction rub.    No murmur heard.  Pulmonary/Chest: Effort normal. No stridor. No respiratory distress. No wheezing or rales. No tenderness.   Abdominal: Soft. Bowel sounds are normal. No distension and no mass. There is no tenderness. There is no rebound and no guarding. No hernia.   Musculoskeletal: Normal range of motion No edema or tenderness.   Lymphadenopathy: No cervical adenopathy.   Neurological: Alert and oriented to person, place, and time. Normal reflexes. Normal reflexes. No cranial nerve deficit. Normal muscle tone. Coordination normal.   Skin: Skin is warm and dry. No rash noted. Not diaphoretic. No erythema. No pallor.   Psychiatric: Normal mood and affect. Behavior is normal. Judgment and thought content normal.   Nursing note and vitals reviewed.      Lab Results   Component Value Date/Time    HBA1C 5.0 02/16/2019 05:52 AM    HBA1C 5.9 (H) 12/12/2018 " 06:00 AM     Lab Results   Component Value Date/Time    SODIUM 139 02/16/2019 05:52 AM    POTASSIUM 3.6 02/16/2019 05:52 AM    CHLORIDE 108 02/16/2019 05:52 AM    CO2 27 02/16/2019 05:52 AM    GLUCOSE 105 (H) 02/16/2019 05:52 AM    BUN 12 02/16/2019 05:52 AM    CREATININE 0.39 (L) 02/16/2019 05:52 AM    CREATININE 0.88 08/14/2010 12:00 AM    BUNCREATRAT 13 08/14/2010 12:00 AM    GLOMRATE >59 08/14/2010 12:00 AM    ALKPHOSPHAT 94 02/16/2019 05:52 AM    ASTSGOT 14 02/16/2019 05:52 AM    ALTSGPT 6 02/16/2019 05:52 AM    TBILIRUBIN 0.3 02/16/2019 05:52 AM     Lab Results   Component Value Date/Time    INR 1.00 02/13/2019 11:44 AM    INR 1.01 12/04/2018 11:15 AM    INR 1.04 05/18/2012 12:20 PM     Lab Results   Component Value Date/Time    CHOLSTRLTOT 168 02/22/2018 05:05 PM    LDL 84 02/22/2018 05:05 PM    HDL 67 02/22/2018 05:05 PM    TRIGLYCERIDE 85 02/22/2018 05:05 PM       No results found for: TESTOSTERONE  Lab Results   Component Value Date/Time    TSH 2.110 08/14/2010 12:00 AM     Lab Results   Component Value Date/Time    FREET4 0.72 05/03/2016 07:32 AM     Lab Results   Component Value Date/Time    URICACID 5.6 08/12/2014 04:49 PM     No components found for: VITB12  Lab Results   Component Value Date/Time    25HYDROXY 17 (L) 02/15/2019 03:30 AM    25HYDROXY 14 (L) 12/12/2018 06:00 AM          Assessment/Plan:     1. Obesity (BMI 30-39.9)  The patient will continue to manage her Obesity with phentermine 37.5mg once daily along with dietary and exercise changes. She denies complications with the medication at this time.   - phentermine 37.5 MG capsule; Take 1 Cap by mouth every morning for 90 days.  Dispense: 90 Cap; Refill: 1    2. Essential hypertension  Chronic, stable history. Under good control via her own efforts. Blood pressure today was 118/76. Patient was asked to continue monitoring her blood pressure at home. She was encouraged to follow a low sodium diet.   - TSH; Future  - Comp Metabolic Panel;  Future    3. Microcytic anemia- postop THR 2019  The patient was informed that we will continue to monitor he post operative microcytic anemia. I informed her that labs to evaluate have been ordered.   - CBC WITH DIFFERENTIAL; Future  - VITAMIN B12; Future  - FOLATE; Future  - IRON/TOTAL IRON BIND; Future  - FERRITIN; Future    4. Mild intermittent asthma without complication  Under good control.     5. Primary osteoarthritis of both knees- sp right TKR ; left TKR tbd - dr nowak  Under good control. Continue same regimen.    6. Chronic bilateral low back pain with bilateral sciatica- pain mgt;    spine nv  Under good control. Continue same regimen.    7. Obesity (BMI 30-39.9)- PHENTERMINE RX  Under good control. Continue same regimen.  - phentermine 37.5 MG capsule; Take 1 Cap by mouth every morning for 90 days.  Dispense: 90 Cap; Refill: 1    8. Need for hepatitis C screening test  The patient is due for hepatitis C screening.   - HCV Scrn ( 3915-4217 1xLife); Future    9. Need for vaccination  The patient received these vaccines in the clinic today.   - Influenza Vaccine Quad Injection (PF)  - Hepatitis B Vaccine Adult 20+    10. IFG (impaired fasting glucose)  We will continue to monitor the patient's impaired fasting glucose. I informed her that labs have been ordered.   - HEMOGLOBIN A1C; Future    11. Healthcare maintenance  Routine healthcare maintenance labs have been ordered. The patient is understanding and is consenting to completing these prior to her next visit.   - Comp Metabolic Panel; Future  - CBC WITH DIFFERENTIAL; Future  - Lipid Profile; Future          Pineda IRELAND (Ludy), am scribing for, and in the presence of, Micky Rubin M.D..    Electronically signed by: Pineda Johnson (Ludy), 2019    IMicky M.D., personally performed the services described in this documentation, as scribed by Pineda Johnson in my presence, and it is both accurate and complete.

## 2019-12-30 ENCOUNTER — APPOINTMENT (OUTPATIENT)
Dept: RADIOLOGY | Facility: IMAGING CENTER | Age: 54
End: 2019-12-30
Attending: PHYSICIAN ASSISTANT
Payer: COMMERCIAL

## 2019-12-30 ENCOUNTER — OFFICE VISIT (OUTPATIENT)
Dept: URGENT CARE | Facility: CLINIC | Age: 54
End: 2019-12-30
Payer: COMMERCIAL

## 2019-12-30 VITALS
OXYGEN SATURATION: 92 % | BODY MASS INDEX: 43.4 KG/M2 | DIASTOLIC BLOOD PRESSURE: 70 MMHG | RESPIRATION RATE: 22 BRPM | WEIGHT: 245 LBS | HEART RATE: 78 BPM | TEMPERATURE: 98.5 F | SYSTOLIC BLOOD PRESSURE: 118 MMHG

## 2019-12-30 DIAGNOSIS — J22 LOWER RESPIRATORY INFECTION: ICD-10-CM

## 2019-12-30 DIAGNOSIS — R05.9 COUGH: ICD-10-CM

## 2019-12-30 DIAGNOSIS — R04.2 HEMOPTYSIS: ICD-10-CM

## 2019-12-30 PROCEDURE — 71046 X-RAY EXAM CHEST 2 VIEWS: CPT | Mod: TC | Performed by: PHYSICIAN ASSISTANT

## 2019-12-30 PROCEDURE — 99214 OFFICE O/P EST MOD 30 MIN: CPT | Performed by: PHYSICIAN ASSISTANT

## 2019-12-30 RX ORDER — BENZONATATE 200 MG/1
200 CAPSULE ORAL 3 TIMES DAILY PRN
Qty: 30 CAP | Refills: 0 | Status: SHIPPED | OUTPATIENT
Start: 2019-12-30 | End: 2020-06-10

## 2019-12-30 RX ORDER — DOXYCYCLINE HYCLATE 100 MG/1
100 CAPSULE ORAL 2 TIMES DAILY
Qty: 14 EACH | Refills: 0 | Status: SHIPPED | OUTPATIENT
Start: 2019-12-30 | End: 2020-01-06

## 2019-12-30 ASSESSMENT — ENCOUNTER SYMPTOMS
WHEEZING: 0
SPUTUM PRODUCTION: 1
HEMOPTYSIS: 1
CHILLS: 0
FEVER: 0
PALPITATIONS: 0
SORE THROAT: 1
SHORTNESS OF BREATH: 0
COUGH: 1

## 2019-12-30 NOTE — PROGRESS NOTES
Subjective:      Karine Ovalle is a 54 y.o. female who presents with Sinus Problem (Over 4 wks a lot mucus, today with blood on it .)            Cough   This is a new problem. Episode onset: 4 weeks ago. The problem has been gradually worsening. The problem occurs constantly. The cough is productive of bloody sputum. Associated symptoms include hemoptysis and a sore throat. Pertinent negatives include no chest pain, chills, ear pain, fever, shortness of breath or wheezing. Nothing aggravates the symptoms. She has tried OTC cough suppressant for the symptoms. The treatment provided no relief.       Review of Systems   Constitutional: Positive for malaise/fatigue. Negative for chills and fever.   HENT: Positive for congestion and sore throat. Negative for ear pain.    Respiratory: Positive for cough, hemoptysis and sputum production. Negative for shortness of breath and wheezing.    Cardiovascular: Negative for chest pain and palpitations.   All other systems reviewed and are negative.    PMH:  has a past medical history of Anemia, Arthritis, At risk for falls, Chronic back pain, Dental disorder, Pain (12/04/2018), Pain (02/04/2019), and Urinary incontinence. She also has no past medical history of ASTHMA, Breast cancer (HCC), or Diabetes.  MEDS:   Current Outpatient Medications:   •  phentermine 37.5 MG capsule, Take 1 Cap by mouth every morning for 90 days., Disp: 90 Cap, Rfl: 1  •  gabapentin (NEURONTIN) 600 MG tablet, Take 1 Tab by mouth 3 times a day., Disp: 90 Tab, Rfl: 3  •  methocarbamol (ROBAXIN) 500 MG Tab, Take 2 Tabs by mouth 4 times a day., Disp: 236 Tab, Rfl: 4  •  morphine ER (MS CONTIN) 30 MG Tab CR tablet, Take 30 mg by mouth., Disp: , Rfl: 0  •  oxyCODONE-acetaminophen (PERCOCET-10)  MG Tab, Take 1 Tab by mouth., Disp: , Rfl: 0  •  potassium chloride SA (KDUR) 20 MEQ Tab CR, TAKE 1 TABLET BY MOUTH ONCE DAILY, Disp: 100 Tab, Rfl: 1  •  furosemide (LASIX) 40 MG Tab, Take 1 Tab by mouth  every day., Disp: 90 Tab, Rfl: 4  •  senna-docusate (PERICOLACE OR SENOKOT S) 8.6-50 MG Tab, Take 1 Tab by mouth 2 times a day., Disp: , Rfl:   •  polyethylene glycol 3350 (MIRALAX) Powder, Take 17 g by mouth every day., Disp: , Rfl:   •  Esomeprazole Magnesium (NEXIUM 24HR PO), Take 20 mg by mouth 1 time daily as needed (heartburn)., Disp: , Rfl:   •  Calcium Carbonate-Vitamin D (CALCIUM 600 + D) 600-400 MG-UNIT Tab, Take 1 Tab by mouth 3 times a day., Disp: , Rfl:   •  Omega-3 Fatty Acids (FISH OIL) 1000 MG Cap capsule, Take 1,000 mg by mouth every day., Disp: , Rfl:   •  Multiple Vitamins-Minerals (ONE-A-DAY WOMENS 50 PLUS PO), Take 1 tablet by mouth every day., Disp: , Rfl:   •  non-formulary med, Take 3 Tabs by mouth 2 Times a Day. Collagen Advanced Formula, Disp: , Rfl:   •  ferrous sulfate 325 (65 Fe) MG tablet, Take 1 Tab by mouth every morning with breakfast., Disp: 30 Tab, Rfl: 1  •  aspirin 81 MG EC tablet, Take 1 Tab by mouth 2 times a day. (Patient not taking: Reported on 12/30/2019), Disp: 60 Tab, Rfl: 0  •  Misc. Devices Misc, Adult briefs XXL (FEMALE) FOR URINARY INCONTINENCE- ANY BRAND PER PT PREFERENCE OR INSURANC ALLOWANCE; TO CHANGE QID, Disp: 120 Each, Rfl: 5  •  ascorbic acid (VITAMIN C) 500 MG tablet, Take 1 Tab by mouth every morning with breakfast., Disp: 30 Tab, Rfl: 3  •  cyanocobalamin (VITAMIN B12) 1000 MCG Tab, Take 1 Tab by mouth every day., Disp: 90 Tab, Rfl: 4  ALLERGIES: No Known Allergies  SURGHX:   Past Surgical History:   Procedure Laterality Date   • HIP ARTHROPLASTY TOTAL Left 2/13/2019    Procedure: HIP ARTHROPLASTY TOTAL, Cabling of intra-operative calcar fracture;  Surgeon: Bryon Levine M.D.;  Location: SURGERY [facility];  Service: Orthopedics   • CERVICAL FUSION POSTERIOR  12/7/2018    Procedure: CERVICAL FUSION POSTERIOR- STAGE #2 C3-5 AND C3-T1;  Surgeon: Emre Mendoza III, M.D.;  Location: SURGERY [facility];  Service: Neurosurgery   • CERVICAL LAMINECTOMY  "POSTERIOR  12/7/2018    Procedure: CERVICAL LAMINECTOMY POSTERIOR C2-T1;  Surgeon: Emre Mendoza III, M.D.;  Location: SURGERY [facility];  Service: Neurosurgery   • CERVICAL DISK AND FUSION ANTERIOR  12/5/2018    Procedure: CERVICAL DISK AND FUSION ANTERIOR-STAGE #1 C4-5;  Surgeon: Emre Mendoza III, M.D.;  Location: SURGERY [facility];  Service: Neurosurgery   • CORPECTOMY  12/5/2018    Procedure: CORPECTOMY;  Surgeon: Emre Mendoza III, M.D.;  Location: SURGERY [facility];  Service: Neurosurgery   • HIP ARTHROPLASTY TOTAL Right 3/20/2018    Procedure: HIP ARTHROPLASTY TOTAL;  Surgeon: Bryon Levine M.D.;  Location: SURGERY [facility];  Service: Orthopedics   • KNEE ARTHROPLASTY TOTAL Right 10/20/2015    Procedure: KNEE ARTHROPLASTY TOTAL;  Surgeon: Bryon Levine M.D.;  Location: SURGERY [facility];  Service:    • APPENDECTOMY LAPAROSCOPIC  3/21/2013    Performed by Dali Queen M.D. at [facility]   • DEBRIDEMENT  5/17/2012    Performed by BRADLEY DYKES at SURGERY [facility]   • PLASTIC SURGERY  2004    excess skin on arms and legs removed   • MAMMOPLASTY AUGMENTATION Bilateral 2004    breast implants and \"tummy tuck\"   • GASTRIC BYPASS LAPAROSCOPIC  2002    x 2   • ABDOMINAL EXPLORATION       SOCHX:  reports that she has never smoked. She has never used smokeless tobacco. She reports previous alcohol use. She reports that she does not use drugs.  FH: Family history was reviewed, no pertinent findings to report  Medications, Allergies, and current problem list reviewed today in Epic       Objective:     Blood Pressure 118/70   Pulse 78   Temperature 36.9 °C (98.5 °F) (Temporal)   Respiration (Abnormal) 22   Weight 111.1 kg (245 lb)   Last Menstrual Period  (LMP Unknown)   Oxygen Saturation 92%   Body Mass Index 43.40 kg/m²      Physical Exam  Vitals signs reviewed.   Constitutional:       General: She is not in acute distress.     Appearance: She is " well-developed. She is not ill-appearing or toxic-appearing.      Interventions: She is not intubated.  HENT:      Head: Normocephalic and atraumatic.      Right Ear: Hearing, tympanic membrane, ear canal and external ear normal.      Left Ear: Hearing, tympanic membrane, ear canal and external ear normal.      Nose: Nose normal.      Mouth/Throat:      Pharynx: Uvula midline.   Eyes:      General: Lids are normal.      Conjunctiva/sclera: Conjunctivae normal.   Neck:      Musculoskeletal: Full passive range of motion without pain, normal range of motion and neck supple.   Cardiovascular:      Rate and Rhythm: Regular rhythm.      Heart sounds: Normal heart sounds, S1 normal and S2 normal. No murmur. No friction rub. No gallop.    Pulmonary:      Effort: Pulmonary effort is normal. No tachypnea, bradypnea, accessory muscle usage or respiratory distress. She is not intubated.      Breath sounds: Normal breath sounds. No stridor. No decreased breath sounds, wheezing, rhonchi or rales.   Chest:      Chest wall: No tenderness.   Musculoskeletal: Normal range of motion.   Skin:     General: Skin is warm and dry.   Neurological:      Mental Status: She is alert and oriented to person, place, and time.   Psychiatric:         Speech: Speech normal.         Behavior: Behavior normal.         Thought Content: Thought content normal.         Judgment: Judgment normal.               Narrative & Impression        12/30/2019 12:09 PM     HISTORY/REASON FOR EXAM:  Cough.        TECHNIQUE/EXAM DESCRIPTION AND NUMBER OF VIEWS:  Two views of the chest.     COMPARISON:  February 13, 2019     FINDINGS:  The heart is normal in size.  No pulmonary infiltrates or consolidations are noted.  No pleural effusions are appreciated.        IMPRESSION:     No active disease.          Assessment/Plan:   Patient is a 54-year-old female who presents for evaluation of cough for 4 weeks.  She states that cough is been productive and recently has  changed into bloody sputum.  She denies any chest pain or shortness of breath.  Vital signs within normal limits.  Lungs are clear to auscultation.  She is at low risk for PE per Wells criteria.  Chest x-ray is normal.  We will treat for lower respiratory infection due to length of time of symptoms.    1. Hemoptysis    - DX-CHEST-2 VIEWS; Future    2. Lower respiratory infection    Differential diagnosis, natural history, supportive care discussed. Follow-up with primary care provider within 7-10 days, emergency room precautions discussed.  Patient and/or family appears understanding of information.  Handout and review of patients diagnosis and treatment was discussed extensively.

## 2019-12-30 NOTE — PATIENT INSTRUCTIONS
Hemoptysis  Hemoptysis is coughing up blood. It can be mild or serious. With mild hemoptysis, you may cough up blood-streaked saliva and mucus (sputum) when you have an infection in your nose, throat, or lungs (respiratory tract). Coughing up 1-2 cups (240-480 mL) of blood within 24 hours (massive hemoptysis) is a medical emergency.  The most common cause of hemoptysis is a respiratory tract infection, such as bronchitis or pneumonia. Other common causes include:  · A lung tumor or upper airway tumor.  · A medical condition that damages the large air passageways (bronchiectasis).  · A blood clot in the lungs (pulmonary embolism).  · A medical condition that keeps your blood from clotting normally.  · Breathing in (inhaling) a small foreign object.  Sometimes the cause is not known. Hemoptysis can be a sign of a minor or serious medical condition, so it is important to see your health care provider.  Follow these instructions at home:  · Watch your condition for any changes.  · Take over-the-counter and prescription medicines only as told by your health care provider.  · If you were prescribed an antibiotic medicine, take it as told by your health care provider. Do not stop taking the antibiotic even if you start to feel better.  · Return to your normal activities as told by your health care provider. Ask your health care provider what activities are safe for you.  · Do not use any products that contain nicotine or tobacco, such as cigarettes and e-cigarettes. If you need help quitting, ask your health care provider.  · Keep all follow-up visits as told by your health care provider. This is important.  Contact a health care provider if:  · You have a fever.  · You cough up blood-streaked (blood-tinged) sputum.  Get help right away if:  · You cough up fresh blood or blood clots.  · You have trouble breathing.  · You have chest pain.  This information is not intended to replace advice given to you by your health care  provider. Make sure you discuss any questions you have with your health care provider.  Document Released: 02/26/2003 Document Revised: 09/15/2017 Document Reviewed: 09/15/2017  Elsevier Interactive Patient Education © 2017 [employer]

## 2019-12-30 NOTE — LETTER
December 30, 2019         Patient: Karine Ovalle   YOB: 1965   Date of Visit: 12/30/2019           To Whom it May Concern:    Karine Ovalle was seen in my clinic on 12/30/2019. Please excuse her from work on this day.  If you have any questions or concerns, please don't hesitate to call.        Sincerely,           Steve Hurst P.A.-C.  Electronically Signed

## 2020-01-02 ENCOUNTER — HOSPITAL ENCOUNTER (EMERGENCY)
Facility: MEDICAL CENTER | Age: 55
End: 2020-01-02
Attending: EMERGENCY MEDICINE
Payer: COMMERCIAL

## 2020-01-02 ENCOUNTER — APPOINTMENT (OUTPATIENT)
Dept: RADIOLOGY | Facility: MEDICAL CENTER | Age: 55
End: 2020-01-02
Attending: EMERGENCY MEDICINE
Payer: COMMERCIAL

## 2020-01-02 VITALS
RESPIRATION RATE: 18 BRPM | DIASTOLIC BLOOD PRESSURE: 89 MMHG | SYSTOLIC BLOOD PRESSURE: 148 MMHG | TEMPERATURE: 97.8 F | OXYGEN SATURATION: 99 % | HEART RATE: 97 BPM

## 2020-01-02 VITALS
HEIGHT: 63 IN | TEMPERATURE: 98.9 F | WEIGHT: 272.49 LBS | SYSTOLIC BLOOD PRESSURE: 142 MMHG | DIASTOLIC BLOOD PRESSURE: 64 MMHG | BODY MASS INDEX: 48.28 KG/M2 | HEART RATE: 90 BPM | RESPIRATION RATE: 18 BRPM

## 2020-01-02 DIAGNOSIS — M25.511 ACUTE PAIN OF RIGHT SHOULDER: ICD-10-CM
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
## 2020-01-02 DIAGNOSIS — M25.551 PAIN OF RIGHT HIP JOINT: ICD-10-CM

## 2020-01-02 PROCEDURE — 73030 X-RAY EXAM OF SHOULDER: CPT | Mod: RT

## 2020-01-02 PROCEDURE — 99284 EMERGENCY DEPT VISIT MOD MDM: CPT

## 2020-01-02 PROCEDURE — 73502 X-RAY EXAM HIP UNI 2-3 VIEWS: CPT | Mod: RT

## 2020-01-02 NOTE — ED PROVIDER NOTES
ED Provider Note    CHIEF COMPLAINT  Chief Complaint   Patient presents with   • Fall     R hip pain after fall  Had both hips replaced in the past       HPI  Karine Ovalle is a 54 y.o. female who presents with right hip over left hip pain after falling in the shower this morning she has been able to ambulate with her cane.  She was mostly concerned because she has had both hips replaced and want to make sure she did not have any dislocation or fracture.    REVIEW OF SYSTEMS  Positive for hip pain, negative for other injuries.     PAST MEDICAL HISTORY   has a past medical history of Anemia, Arthritis, At risk for falls, Chronic back pain, Dental disorder, Pain (12/04/2018), Pain (02/04/2019), and Urinary incontinence.    SOCIAL HISTORY  Social History     Tobacco Use   • Smoking status: Never Smoker   • Smokeless tobacco: Never Used   Substance and Sexual Activity   • Alcohol use: Yes     Comment: 3-4 per week; pt stops alcohol use 1-week ago   • Drug use: No     Frequency: 4.0 times per week   • Sexual activity: Never       SURGICAL HISTORY   has a past surgical history that includes gastric bypass laparoscopic (2002); abdominal exploration; plastic surgery (2004); debridement (5/17/2012); appendectomy laparoscopic (3/21/2013); knee arthroplasty total (Right, 10/20/2015); mammoplasty augmentation (Bilateral, 2004); hip arthroplasty total (Right, 3/20/2018); cervical disk and fusion anterior (12/5/2018); corpectomy (12/5/2018); cervical fusion posterior (12/7/2018); cervical laminectomy posterior (12/7/2018); and hip arthroplasty total (Left, 2/13/2019).    CURRENT MEDICATIONS  Home Medications    **Home medications have not yet been reviewed for this encounter**         ALLERGIES  No Known Allergies    PHYSICAL EXAM  VITAL SIGNS: /94   Pulse (!) 108   Temp 36.6 °C (97.8 °F) (Temporal)   Resp 18   LMP  (LMP Unknown)   SpO2 99%   Constitutional: Alert in no apparent distress.  HENT: Normocephalic,  Atraumatic, Bilateral external ears normal. Nose normal.   Eyes: Pupils are equal and reactive. Conjunctiva normal, non-icteric.    Lungs: Nonlabored respirations  Skin: Warm, Dry, No erythema, No rash.   Neurologic: Alert, Grossly non-focal.   Psychiatric: Affect normal, Judgment normal, Mood normal, Appears appropriate and not intoxicated.   Extremities: Intact distal pulses, No edema, No tenderness bilateral lower extremity edema no obvious hip deformities no obvious tenderness to palpation    DIAGNOSTIC STUDIES / PROCEDURES  DX-HIP-COMPLETE - UNILATERAL 2+ RIGHT   Final Result         No definite fracture identified.            COURSE & MEDICAL DECISION MAKING  Pertinent Labs & Imaging studies reviewed. (See chart for details)  This is a 54-year-old female that presents with hip pain after fall in the bathtub earlier today.  She is able to ambulate with her cane.  There is no obvious fracture dislocation on x-ray.  Patient was reassured and she will be discharged.     The patient will return for new or worsening symptoms and is stable at the time of discharge. Patient was given return precautions. Patient and/or family member verbalizes understanding and will comply.    DISPOSITION:  Patient will be discharged home in stable condition.    FOLLOW UP:  Micky Rubin M.D.  [address] [id number] [phone]      As needed, Please follow-up your blood pressure was elevated    [facility], Emergency Dept  [address]  [place] [address] [phone]    Worsening pain inability to walk or other concerns.        OUTPATIENT MEDICATIONS:  New Prescriptions    No medications on file       Your blood pressure is elevated here in the emergency department. Please monitor your blood pressure over the next several days. If your blood pressure continues to be 120/80 or higher please contact your physician for blood pressure management.       FINAL IMPRESSION  1. Fall,  initial encounter     2. Pain of right hip joint         2.   3.     This dictation has been creating using voice recognition software. The accuracy of the dictation is limited the abilities of the software.  I expect there may be some errors of grammar and possibly content. I made every attempt to manually correct the errors within my dictation. However errors related to this voice recognition software may still exist and should be interpreted within the appropriate context.        The note accurately reflects work and decisions made by me.  Crystal Wolf  1/2/2020  3:47 PM

## 2020-01-03 NOTE — ED TRIAGE NOTES
Pt was at [facility] after being discharged from here earlier, pt states she had one drink at the [facility] and sustained a ground level fall, denies any LOC or hitting head. Pt endorses bilat shoulder pain, back pain, and bilat knee pain, no obvious trauma at time of triage noted.

## 2020-01-03 NOTE — ED PROVIDER NOTES
"ED Provider Note    CHIEF COMPLAINT  Chief Complaint   Patient presents with   • Fall       HPI  Karine Ovalle is a 54 y.o. female who presents with a fall today that is mechanical in her right shoulder.  She denies any numbness or tingling.  She denies hitting her head.  She said it hurts to raise her arm over her right head.  She has no fevers or chills.  There is no chest pain.    REVIEW OF SYSTEMS  See HPI for further details. All other systems are negative.     PAST MEDICAL HISTORY   has a past medical history of Anemia, Arthritis, At risk for falls, Chronic back pain, Dental disorder, Pain (12/04/2018), Pain (02/04/2019), and Urinary incontinence.    SOCIAL HISTORY  Social History     Tobacco Use   • Smoking status: Never Smoker   • Smokeless tobacco: Never Used   Substance and Sexual Activity   • Alcohol use: Yes     Comment: 3-4 per week; pt stops alcohol use 1-week ago   • Drug use: No     Frequency: 4.0 times per week   • Sexual activity: Never       SURGICAL HISTORY   has a past surgical history that includes gastric bypass laparoscopic (2002); abdominal exploration; plastic surgery (2004); debridement (5/17/2012); appendectomy laparoscopic (3/21/2013); knee arthroplasty total (Right, 10/20/2015); mammoplasty augmentation (Bilateral, 2004); hip arthroplasty total (Right, 3/20/2018); cervical disk and fusion anterior (12/5/2018); corpectomy (12/5/2018); cervical fusion posterior (12/7/2018); cervical laminectomy posterior (12/7/2018); and hip arthroplasty total (Left, 2/13/2019).    CURRENT MEDICATIONS  Home Medications    **Home medications have not yet been reviewed for this encounter**         ALLERGIES  No Known Allergies    PHYSICAL EXAM  VITAL SIGNS: /64   Pulse 90   Temp 37.2 °C (98.9 °F) (Temporal)   Resp 18   Ht 1.6 m (5' 3\")   Wt 123.6 kg (272 lb 7.8 oz)   LMP  (LMP Unknown)   BMI 48.27 kg/m²  @ROBERT[[id number]::@  Pulse ox interpretation: I interpret this pulse ox as " normal.  Constitutional: Alert in no apparent distress.  HENT: Normocephalic, Atraumatic, Bilateral external ears normal. Nose normal.   Eyes: Pupils are equal and reactive. Conjunctiva normal, non-icteric.   Heart: Regular rate and rythm, no murmurs.    Lungs: Clear to auscultation bilaterally.  Skin: Warm, Dry, No erythema, No rash.   Neurologic: Alert, Grossly non-focal.   Psychiatric: Affect normal, Judgment normal, Mood normal, Appears appropriate and not intoxicated.   MSK: There is mild tenderness palpation of the patient's right shoulder.  There is pain with ranging the arm over the head.  There is no clavicular tenderness.      COURSE & MEDICAL DECISION MAKING  Pertinent Labs & Imaging studies reviewed. (See chart for details)    54-year-old female who presents with right shoulder pain.  At this time we will get an x-ray of the right shoulder.  She is neurovascular intact distal to this.  There is no other evidence of trauma.    DX-SHOULDER 2+ RIGHT   Final Result      Negative 2 views right shoulder      Suboptimal positioning as detailed above          Negative x-ray of the patient's right shoulder.  I will discharge her home with NSAIDs and strict return precautions.      The patient will not drink alcohol nor drive with prescribed medications. The patient will return for worsening symptoms and is stable at the time of discharge. The patient verbalizes understanding and will comply.    FINAL IMPRESSION  1. Fall, initial encounter    2. Acute pain of right shoulder              Electronically signed by: Deangelo Vu, 1/2/2020 9:47 PM

## 2020-01-15 ENCOUNTER — OFFICE VISIT (OUTPATIENT)
Dept: URGENT CARE | Facility: CLINIC | Age: 55
End: 2020-01-15
Payer: COMMERCIAL

## 2020-01-15 VITALS
OXYGEN SATURATION: 99 % | BODY MASS INDEX: 43.05 KG/M2 | HEIGHT: 63 IN | HEART RATE: 92 BPM | SYSTOLIC BLOOD PRESSURE: 108 MMHG | DIASTOLIC BLOOD PRESSURE: 72 MMHG | WEIGHT: 243 LBS | TEMPERATURE: 98.7 F | RESPIRATION RATE: 18 BRPM

## 2020-01-15 DIAGNOSIS — R09.81 CONGESTION OF NASAL SINUS: ICD-10-CM

## 2020-01-15 DIAGNOSIS — R06.2 WHEEZING: ICD-10-CM

## 2020-01-15 PROCEDURE — 99214 OFFICE O/P EST MOD 30 MIN: CPT | Performed by: PHYSICIAN ASSISTANT

## 2020-01-15 RX ORDER — FLUTICASONE PROPIONATE 50 MCG
1 SPRAY, SUSPENSION (ML) NASAL DAILY
Qty: 16 G | Refills: 0 | Status: SHIPPED
Start: 2020-01-15 | End: 2020-01-16

## 2020-01-15 RX ORDER — IPRATROPIUM BROMIDE AND ALBUTEROL SULFATE 2.5; .5 MG/3ML; MG/3ML
3 SOLUTION RESPIRATORY (INHALATION) ONCE
Status: COMPLETED | OUTPATIENT
Start: 2020-01-15 | End: 2020-01-15

## 2020-01-15 RX ADMIN — IPRATROPIUM BROMIDE AND ALBUTEROL SULFATE 3 ML: 2.5; .5 SOLUTION RESPIRATORY (INHALATION) at 19:15

## 2020-01-15 NOTE — LETTER
January 15, 2020         Patient: Karine Ovalle   YOB: 1965   Date of Visit: 1/15/2020           To Whom it May Concern:    Karine Ovalle was seen in my clinic on 1/15/2020. Please excuse this patient from work today due to recent ailment.   If you have any questions or concerns, please don't hesitate to call.        Sincerely,           Benjamín Ding P.A.-C.  Electronically Signed

## 2020-01-16 ENCOUNTER — HOSPITAL ENCOUNTER (EMERGENCY)
Facility: MEDICAL CENTER | Age: 55
End: 2020-01-16
Attending: EMERGENCY MEDICINE
Payer: COMMERCIAL

## 2020-01-16 VITALS
OXYGEN SATURATION: 96 % | DIASTOLIC BLOOD PRESSURE: 92 MMHG | BODY MASS INDEX: 42.73 KG/M2 | HEART RATE: 105 BPM | WEIGHT: 241.18 LBS | SYSTOLIC BLOOD PRESSURE: 145 MMHG | TEMPERATURE: 97.6 F | HEIGHT: 63 IN | RESPIRATION RATE: 16 BRPM

## 2020-01-16 DIAGNOSIS — R05.9 COUGH: ICD-10-CM

## 2020-01-16 DIAGNOSIS — J01.10 ACUTE NON-RECURRENT FRONTAL SINUSITIS: ICD-10-CM

## 2020-01-16 PROCEDURE — 99283 EMERGENCY DEPT VISIT LOW MDM: CPT

## 2020-01-16 RX ORDER — MORPHINE SULFATE 15 MG/1
15 TABLET, FILM COATED, EXTENDED RELEASE ORAL EVERY 12 HOURS
COMMUNITY
Start: 2019-12-26 | End: 2022-08-29

## 2020-01-16 RX ORDER — AZITHROMYCIN 250 MG/1
TABLET, FILM COATED ORAL
Qty: 6 TAB | Refills: 0 | Status: SHIPPED | OUTPATIENT
Start: 2020-01-16 | End: 2020-05-21

## 2020-01-16 RX ORDER — BENZONATATE 100 MG/1
100 CAPSULE ORAL 3 TIMES DAILY PRN
Qty: 30 CAP | Refills: 0 | Status: SHIPPED | OUTPATIENT
Start: 2020-01-16 | End: 2020-06-10

## 2020-01-16 RX ORDER — GABAPENTIN 300 MG/1
300 CAPSULE ORAL
Refills: 5 | COMMUNITY
Start: 2019-10-25 | End: 2020-01-16

## 2020-01-16 ASSESSMENT — ENCOUNTER SYMPTOMS
EYE REDNESS: 0
EYE DISCHARGE: 0
HEMOPTYSIS: 0
SORE THROAT: 0
COUGH: 0
FEVER: 0
SPUTUM PRODUCTION: 0
SINUS PAIN: 0
SHORTNESS OF BREATH: 0
WHEEZING: 1

## 2020-01-16 NOTE — PROGRESS NOTES
"Subjective:      Karine Ovalle is a 54 y.o. female who presents with Wheezing (x3 weeks)          Patient is a 54-year-old female who presents to urgent care with intermittent wheezing over the last 2 to 3 weeks.  Patient previously had a evaluation on 12-30 when she started on doxycycline along with benzonatate for recent sinus concern and coughing.  She does report that her cough is significantly improved along with mucus production.  She does still have some drainage in her throat but \"nothing like before \". She then developed some wheezing intermittently- worse the last few days. She denies hx of asthma- however this was noted on her chart in the past.       Wheezing    This is a new problem. Episode onset: 2-3 weeks ago. The problem occurs intermittently. The problem has been waxing and waning. Pertinent negatives include no coughing, fever, hemoptysis, rash, shortness of breath, sore throat or sputum production. Nothing aggravates the symptoms. She has tried OTC cough suppressant for the symptoms. The treatment provided mild relief. Documentation of asthma to chart.        Review of Systems   Constitutional: Negative for fever.   HENT: Positive for congestion. Negative for sinus pain and sore throat.    Eyes: Negative for discharge and redness.   Respiratory: Positive for wheezing. Negative for cough, hemoptysis, sputum production and shortness of breath.    Musculoskeletal:        Hx of chronic neck, hip, and back pain.      Skin: Negative for rash.   All other systems reviewed and are negative.         Objective:     /72 (BP Location: Right arm, Patient Position: Sitting, BP Cuff Size: Adult long)   Pulse 92   Temp 37.1 °C (98.7 °F) (Temporal)   Resp 18   Ht 1.6 m (5' 3\")   Wt 110.2 kg (243 lb)   LMP  (LMP Unknown)   SpO2 99%   BMI 43.05 kg/m²    PMH:  has a past medical history of Anemia, Arthritis, At risk for falls, Chronic back pain, Dental disorder, Pain (12/04/2018), Pain " "(02/04/2019), and Urinary incontinence. She also has no past medical history of ASTHMA, Breast cancer (HCC), or Diabetes.  MEDS: Reviewed .   ALLERGIES: No Known Allergies  SURGHX:   Past Surgical History:   Procedure Laterality Date   • HIP ARTHROPLASTY TOTAL Left 2/13/2019    Procedure: HIP ARTHROPLASTY TOTAL, Cabling of intra-operative calcar fracture;  Surgeon: Bryon Levine M.D.;  Location: [facility];  Service: Orthopedics   • CERVICAL FUSION POSTERIOR  12/7/2018    Procedure: CERVICAL FUSION POSTERIOR- STAGE #2 C3-5 AND C3-T1;  Surgeon: Emre Mendoza III, M.D.;  Location: [facility];  Service: Neurosurgery   • CERVICAL LAMINECTOMY POSTERIOR  12/7/2018    Procedure: CERVICAL LAMINECTOMY POSTERIOR C2-T1;  Surgeon: Emre Mendoza III, M.D.;  Location: [facility];  Service: Neurosurgery   • CERVICAL DISK AND FUSION ANTERIOR  12/5/2018    Procedure: CERVICAL DISK AND FUSION ANTERIOR-STAGE #1 C4-5;  Surgeon: Emre Mendoza III, M.D.;  Location: [facility];  Service: Neurosurgery   • CORPECTOMY  12/5/2018    Procedure: CORPECTOMY;  Surgeon: Emre Mendoza III, M.D.;  Location: [facility];  Service: Neurosurgery   • HIP ARTHROPLASTY TOTAL Right 3/20/2018    Procedure: HIP ARTHROPLASTY TOTAL;  Surgeon: Bryon Levine M.D.;  Location: [facility];  Service: Orthopedics   • KNEE ARTHROPLASTY TOTAL Right 10/20/2015    Procedure: KNEE ARTHROPLASTY TOTAL;  Surgeon: Bryon Levine M.D.;  Location: [facility];  Service:    • APPENDECTOMY LAPAROSCOPIC  3/21/2013    Performed by Dali Queen M.D. at [facility]   • DEBRIDEMENT  5/17/2012    Performed by BRADLEY DYKES at [facility]   • PLASTIC SURGERY  2004    excess skin on arms and legs removed   • MAMMOPLASTY AUGMENTATION Bilateral 2004    breast implants and \"tummy tuck\"   • GASTRIC BYPASS LAPAROSCOPIC  2002    x 2   • ABDOMINAL EXPLORATION   "     SOCHX:  reports that she has never smoked. She has never used smokeless tobacco. She reports current alcohol use. She reports that she does not use drugs.  FH: Family history was reviewed, no pertinent findings to report    Physical Exam  Vitals signs reviewed.   Constitutional:       General: She is not in acute distress.     Appearance: She is well-developed.   HENT:      Head: Normocephalic and atraumatic.      Nose: Congestion present.      Mouth/Throat:      Comments: Pos PND.   Eyes:      Conjunctiva/sclera: Conjunctivae normal.      Pupils: Pupils are equal, round, and reactive to light.   Neck:      Musculoskeletal: Normal range of motion and neck supple.      Trachea: No tracheal deviation.   Cardiovascular:      Rate and Rhythm: Normal rate and regular rhythm.   Pulmonary:      Effort: Pulmonary effort is normal. No respiratory distress.      Comments: Minimal upper lung field exp. Wheezing- resolved after breathing tx.   Skin:     General: Skin is warm.      Findings: No rash.   Neurological:      Mental Status: She is alert and oriented to person, place, and time.      Gait: Gait abnormal.      Comments: Walks with walker.    Psychiatric:         Behavior: Behavior normal.         Thought Content: Thought content normal.         Judgment: Judgment normal.                 Assessment/Plan:       1. Wheezing  - ipratropium-albuterol (DUONEB) nebulizer solution  - fluticasone (FLONASE) 50 MCG/ACT nasal spray; Spray 1 Spray in nose every day.  Dispense: 16 g; Refill: 0  - Albuterol Sulfate (PROAIR RESPICLICK) 108 (90 Base) MCG/ACT AEROSOL POWDER, BREATH ACTIVATED; Inhale 1 Puff by mouth every 6 hours as needed (Wheezing).  Dispense: 1 Each; Refill: 0    2. Congestion of nasal sinus  - fluticasone (FLONASE) 50 MCG/ACT nasal spray; Spray 1 Spray in nose every day.  Dispense: 16 g; Refill: 0    Recheck after breathing tx today-pulse ox increased to 99% on room air.  Patient reports significant subjective  "improvement and feels \"refreshed \".  Suspect post viral inflammation-we will start the patient on the above.  Patient and/or guardian given precautionary s/sx that mandate immediate follow up and evaluation in the ED. Advised of risks of not doing so.  Side effects of the above medications discussed.   DDX, Supportive care, and indications for immediate follow-up discussed with patient.    Instructed to return to clinic or nearest emergency department if we are not available for any change in condition, further concerns, or worsening of symptoms.    The patient and/or guardian demonstrated a good understanding and agreed with the treatment plan.    Please note that this dictation was created using voice recognition software. I have made every reasonable attempt to correct obvious errors, but I expect that there are errors of grammar and possibly content that I did not discover before finalizing the note.      "

## 2020-01-17 NOTE — ED NOTES
Discharge instructions provided.  Pt verbalized the understanding of discharge instructions to follow up with PCP and to return to ER if condition worsens.  Pt being wheelchaired out of ER without difficulty with tech. Scripts given

## 2020-01-17 NOTE — ED TRIAGE NOTES
Pt to triage in wheelchair  Chief Complaint   Patient presents with   • Cough   • Congestion   • Wheezing   • Sinus Pain   states she does not have tessalon pearls at home, no relief with albuterol inhaler   Seen at  for same yesterday  Pt A & 0 x 4, speech clear, ambulates well  Pt asked to wait in lobby, pt updated on triage process and pt asked to inform RN of any changes.

## 2020-01-17 NOTE — ED PROVIDER NOTES
"ED Provider Note    CHIEF COMPLAINT  Chief Complaint   Patient presents with   • Cough   • Congestion   • Wheezing   • Sinus Pain       HPI  Karine Ovalle is a 54 y.o. female who presents with multiple complaints.  She has been sick over the past couple of days.  She has had a cough which is been somewhat productive.  She is also had some wheezing and is complaining of sinus pain and drainage.  She was seen at urgent care and provided an inhaler and a nose spray.  She states they do not seem to be helping at this point.    REVIEW OF SYSTEMS  See HPI for further details. All other systems negative.    PAST MEDICAL HISTORY  Past Medical History:   Diagnosis Date   • Anemia    • Arthritis     osteo/hips and back   • At risk for falls    • Chronic back pain    • Dental disorder     lower denture   • Pain 12/04/2018    \"ALL OVER\", 10/10   • Pain 02/04/2019    arthritic pain   • Urinary incontinence     using pads       FAMILY HISTORY  Family History   Problem Relation Age of Onset   • Diabetes Mother    • Heart Disease Mother        SOCIAL HISTORY  Social History     Socioeconomic History   • Marital status: Single     Spouse name: Not on file   • Number of children: Not on file   • Years of education: Not on file   • Highest education level: Not on file   Occupational History   • Not on file   Social Needs   • Financial resource strain: Not on file   • Food insecurity:     Worry: Not on file     Inability: Not on file   • Transportation needs:     Medical: Not on file     Non-medical: Not on file   Tobacco Use   • Smoking status: Never Smoker   • Smokeless tobacco: Never Used   Substance and Sexual Activity   • Alcohol use: Yes     Comment: 3-4 per week; pt stops alcohol use 1-week ago   • Drug use: No     Frequency: 4.0 times per week   • Sexual activity: Never   Lifestyle   • Physical activity:     Days per week: Not on file     Minutes per session: Not on file   • Stress: Not on file   Relationships   • Social " connections:     Talks on phone: Not on file     Gets together: Not on file     Attends Religion service: Not on file     Active member of club or organization: Not on file     Attends meetings of clubs or organizations: Not on file     Relationship status: Not on file   • Intimate partner violence:     Fear of current or ex partner: Not on file     Emotionally abused: Not on file     Physically abused: Not on file     Forced sexual activity: Not on file   Other Topics Concern   • Not on file   Social History Narrative    ** Merged History Encounter **            SURGICAL HISTORY  Past Surgical History:   Procedure Laterality Date   • HIP ARTHROPLASTY TOTAL Left 2/13/2019    Procedure: HIP ARTHROPLASTY TOTAL, Cabling of intra-operative calcar fracture;  Surgeon: Bryon Levine M.D.;  Location: [facility];  Service: Orthopedics   • CERVICAL FUSION POSTERIOR  12/7/2018    Procedure: CERVICAL FUSION POSTERIOR- STAGE #2 C3-5 AND C3-T1;  Surgeon: Emre Mendoza III, M.D.;  Location: [facility];  Service: Neurosurgery   • CERVICAL LAMINECTOMY POSTERIOR  12/7/2018    Procedure: CERVICAL LAMINECTOMY POSTERIOR C2-T1;  Surgeon: Emre Mendoza III, M.D.;  Location: [facility];  Service: Neurosurgery   • CERVICAL DISK AND FUSION ANTERIOR  12/5/2018    Procedure: CERVICAL DISK AND FUSION ANTERIOR-STAGE #1 C4-5;  Surgeon: Emre Mendoza III, M.D.;  Location: [facility];  Service: Neurosurgery   • CORPECTOMY  12/5/2018    Procedure: CORPECTOMY;  Surgeon: Emre Mendoza III, M.D.;  Location: [facility];  Service: Neurosurgery   • HIP ARTHROPLASTY TOTAL Right 3/20/2018    Procedure: HIP ARTHROPLASTY TOTAL;  Surgeon: Bryon Levine M.D.;  Location: [facility];  Service: Orthopedics   • KNEE ARTHROPLASTY TOTAL Right 10/20/2015    Procedure: KNEE ARTHROPLASTY TOTAL;  Surgeon: Bryon Levine M.D.;  Location: [facility];  Service:    •  "APPENDECTOMY LAPAROSCOPIC  3/21/2013    Performed by Dali uQeen M.D. at [facility]   • DEBRIDEMENT  5/17/2012    Performed by BRADLEY DYKES at SURGERY [facility]   • PLASTIC SURGERY  2004    excess skin on arms and legs removed   • MAMMOPLASTY AUGMENTATION Bilateral 2004    breast implants and \"tummy tuck\"   • GASTRIC BYPASS LAPAROSCOPIC  2002    x 2   • ABDOMINAL EXPLORATION         CURRENT MEDICATIONS  Home Medications     Reviewed by Ayanna Brar R.N. (Registered Nurse) on 01/16/20 at 1927  Med List Status: Complete   Medication Last Dose Status   Albuterol Sulfate (PROAIR RESPICLICK) 108 (90 Base) MCG/ACT AEROSOL POWDER, BREATH ACTIVATED 1/16/2020 Active   ascorbic acid (VITAMIN C) 500 MG tablet 1/14/2020 Active   benzonatate (TESSALON) 200 MG capsule not taking Active   Calcium Carbonate-Vitamin D (CALCIUM 600 + D) 600-400 MG-UNIT Tab 1/14/2020 Active   cyanocobalamin (VITAMIN B12) 1000 MCG Tab 1/14/2020 Active   ferrous sulfate 325 (65 Fe) MG tablet 1/15/2020 Active   furosemide (LASIX) 40 MG Tab 1/9/2020 Active   gabapentin (NEURONTIN) 600 MG tablet 1/16/2020 Active   methocarbamol (ROBAXIN) 500 MG Tab 1/16/2020 Active   Misc. Devices Misc  Active   morphine ER (MS CONTIN) 15 MG Tab CR tablet 1/16/2020 Active   Multiple Vitamins-Minerals (ONE-A-DAY WOMENS 50 PLUS PO)  Active   non-formulary med  Active   Omega-3 Fatty Acids (FISH OIL) 1000 MG Cap capsule 1/15/2020 Active   oxyCODONE-acetaminophen (PERCOCET-10)  MG Tab 1/16/2020 Active   phentermine 37.5 MG capsule 1/15/2020 Active   potassium chloride SA (KDUR) 20 MEQ Tab CR 1/9/2020 Active                ALLERGIES  No Known Allergies    PHYSICAL EXAM  VITAL SIGNS: /92   Pulse (!) 105   Temp 36.4 °C (97.6 °F) (Temporal)   Resp 16   Ht 1.6 m (5' 3\")   Wt 109.4 kg (241 lb 2.9 oz)   LMP  (LMP Unknown)   SpO2 96%   BMI 42.72 kg/m²   Constitutional: Well developed, Well nourished, No acute distress, Non-toxic " appearance.   HENT: Normocephalic, Atraumatic, Oropharynx moist and without erythema, edema, or exudates.  She does have tenderness to percussion of her frontal sinuses.  Eyes:  EOMI, Conjunctiva normal, No discharge.   Cardiovascular: Normal heart rate, Normal rhythm, No murmurs, No rubs, No gallops.   Thorax & Lungs: Clear to auscultation without wheezes, rales, or rhonchi.   Skin: Warm, Dry.  Musculoskeletal: Good range of motion in all major joints.  Neurologic: Awake and alert, No focal deficits noted.       COURSE & MEDICAL DECISION MAKING  Pertinent Labs & Imaging studies reviewed. (See chart for details)  Is a 54-year-old here for evaluation of cough, wheezing, and sinus pain.  She is afebrile on arrival.  Her breath sounds are clear and full at this point.  She does have tenderness to percussion of her frontal sinuses.  At this point I believe she does have a sinusitis.  I will start her on azithromycin.  I will provide her a prescription for Tessalon Perles for her cough.  She is instructed to continue the use of her inhaler.  I will have her follow-up with her primary care provider.  She is given a discharge instruction sheet on sinusitis.    FINAL IMPRESSION  1.  Sinusitis  2.  Cough  3.         Electronically signed by: Abdirashid Fuchs M.D., 1/16/2020 7:59 PM

## 2020-03-09 ENCOUNTER — APPOINTMENT (OUTPATIENT)
Dept: RADIOLOGY | Facility: IMAGING CENTER | Age: 55
End: 2020-03-09
Attending: PHYSICIAN ASSISTANT
Payer: COMMERCIAL

## 2020-03-09 ENCOUNTER — OFFICE VISIT (OUTPATIENT)
Dept: URGENT CARE | Facility: CLINIC | Age: 55
End: 2020-03-09
Payer: COMMERCIAL

## 2020-03-09 VITALS
RESPIRATION RATE: 16 BRPM | BODY MASS INDEX: 43.59 KG/M2 | HEIGHT: 63 IN | WEIGHT: 246 LBS | OXYGEN SATURATION: 94 % | HEART RATE: 106 BPM | TEMPERATURE: 98.1 F | SYSTOLIC BLOOD PRESSURE: 118 MMHG | DIASTOLIC BLOOD PRESSURE: 78 MMHG

## 2020-03-09 DIAGNOSIS — Z96.643 HISTORY OF TOTAL REPLACEMENT OF BOTH HIP JOINTS: ICD-10-CM

## 2020-03-09 DIAGNOSIS — J45.21 MILD INTERMITTENT ASTHMA WITH EXACERBATION: ICD-10-CM

## 2020-03-09 DIAGNOSIS — M25.551 RIGHT HIP PAIN: Primary | ICD-10-CM

## 2020-03-09 DIAGNOSIS — M25.551 RIGHT HIP PAIN: ICD-10-CM

## 2020-03-09 PROCEDURE — 99214 OFFICE O/P EST MOD 30 MIN: CPT | Performed by: PHYSICIAN ASSISTANT

## 2020-03-09 PROCEDURE — 73502 X-RAY EXAM HIP UNI 2-3 VIEWS: CPT | Mod: TC,RT | Performed by: PHYSICIAN ASSISTANT

## 2020-03-09 ASSESSMENT — FIBROSIS 4 INDEX: FIB4 SCORE: 0.63

## 2020-03-09 NOTE — PROGRESS NOTES
"Subjective:      Pt is a 54 y.o. female who presents with Hip Pain (right)            HPI  This is a new problem. Pt notes recently fell 2 days ago after taking a sleeping pill at home and injured right hip and notes in 2017 B/L THRs. Pt notes right hip loss of ROM and minor pain. Pt also notes asthma exacerbation recently with PROAIR inhaler only minorly helping. Pt has not taken any Rx medications for this condition. Pt states the pain is a 5/10, aching in nature and worse at night. Pt denies CP, SOB, NVD, paresthesias, headaches, dizziness, change in vision, hives, or other joint pain. The pt's medication list, problem list, and allergies have been evaluated and reviewed during today's visit.    PMH:  Past Medical History:   Diagnosis Date   • Anemia    • Arthritis     osteo/hips and back   • At risk for falls    • Chronic back pain    • Dental disorder     lower denture   • Pain 12/04/2018    \"ALL OVER\", 10/10   • Pain 02/04/2019    arthritic pain   • Urinary incontinence     using pads       PSH:  Past Surgical History:   Procedure Laterality Date   • HIP ARTHROPLASTY TOTAL Left 2/13/2019    Procedure: HIP ARTHROPLASTY TOTAL, Cabling of intra-operative calcar fracture;  Surgeon: Bryon Levine M.D.;  Location: [facility];  Service: Orthopedics   • CERVICAL FUSION POSTERIOR  12/7/2018    Procedure: CERVICAL FUSION POSTERIOR- STAGE #2 C3-5 AND C3-T1;  Surgeon: Emre Mendoza III, M.D.;  Location: [facility];  Service: Neurosurgery   • CERVICAL LAMINECTOMY POSTERIOR  12/7/2018    Procedure: CERVICAL LAMINECTOMY POSTERIOR C2-T1;  Surgeon: Emre Mendoza III, M.D.;  Location: [facility];  Service: Neurosurgery   • CERVICAL DISK AND FUSION ANTERIOR  12/5/2018    Procedure: CERVICAL DISK AND FUSION ANTERIOR-STAGE #1 C4-5;  Surgeon: Emre Mendoza III, M.D.;  Location: [facility];  Service: Neurosurgery   • CORPECTOMY  12/5/2018    Procedure: CORPECTOMY;  Surgeon: " "Emre Mendoza III, M.D.;  Location: SUR[facility]ERY [facility];  Service: Neurosurgery   • HIP ARTHROPLASTY TOTAL Right 3/20/2018    Procedure: HIP ARTHROPLASTY TOTAL;  Surgeon: Bryon Levine M.D.;  Location: SURGERY [facility];  Service: Orthopedics   • KNEE ARTHROPLASTY TOTAL Right 10/20/2015    Procedure: KNEE ARTHROPLASTY TOTAL;  Surgeon: Bryon Levine M.D.;  Location: SURGERY [facility];  Service:    • APPENDECTOMY LAPAROSCOPIC  3/21/2013    Performed by Dali Queen M.D. at SURGERY [facility]   • DEBRIDEMENT  2012    Performed by BRADLEY DYKES at SURGERY [facility]   • PLASTIC SURGERY      excess skin on arms and legs removed   • MAMMOPLASTY AUGMENTATION Bilateral     breast implants and \"tummy tuck\"   • GASTRIC BYPASS LAPAROSCOPIC  2002    x 2   • ABDOMINAL EXPLORATION         Fam Hx:    family history includes Diabetes in her mother; Heart Disease in her mother.  Family Status   Relation Name Status   • Mo     • Fa         Soc HX:  Social History     Socioeconomic History   • Marital status: Single     Spouse name: Not on file   • Number of children: Not on file   • Years of education: Not on file   • Highest education level: Not on file   Occupational History   • Not on file   Social Needs   • Financial resource strain: Not on file   • Food insecurity     Worry: Not on file     Inability: Not on file   • Transportation needs     Medical: Not on file     Non-medical: Not on file   Tobacco Use   • Smoking status: Never Smoker   • Smokeless tobacco: Never Used   Substance and Sexual Activity   • Alcohol use: Yes     Comment: 3-4 per week; pt stops alcohol use 1-week ago   • Drug use: No     Frequency: 4.0 times per week   • Sexual activity: Never   Lifestyle   • Physical activity     Days per week: Not on file     Minutes per session: Not on file   • Stress: Not on file   Relationships   • Social connections     Talks on phone: Not on file     Gets " together: Not on file     Attends Confucianist service: Not on file     Active member of club or organization: Not on file     Attends meetings of clubs or organizations: Not on file     Relationship status: Not on file   • Intimate partner violence     Fear of current or ex partner: Not on file     Emotionally abused: Not on file     Physically abused: Not on file     Forced sexual activity: Not on file   Other Topics Concern   • Not on file   Social History Narrative    ** Merged History Encounter **              Medications:    Current Outpatient Medications:   •  morphine ER (MS CONTIN) 15 MG Tab CR tablet, , Disp: , Rfl:   •  azithromycin (ZITHROMAX Z-THERESA) 250 MG Tab, Take 2 tablets orally on day 1. Take one tablet a day for the next 4 days., Disp: 6 Tab, Rfl: 0  •  benzonatate (TESSALON PERLES) 100 MG Cap, Take 1 Cap by mouth 3 times a day as needed for Cough., Disp: 30 Cap, Rfl: 0  •  Albuterol Sulfate (PROAIR RESPICLICK) 108 (90 Base) MCG/ACT AEROSOL POWDER, BREATH ACTIVATED, Inhale 1 Puff by mouth every 6 hours as needed (Wheezing)., Disp: 1 Each, Rfl: 0  •  benzonatate (TESSALON) 200 MG capsule, Take 1 Cap by mouth 3 times a day as needed for Cough., Disp: 30 Cap, Rfl: 0  •  phentermine 37.5 MG capsule, Take 1 Cap by mouth every morning for 90 days., Disp: 90 Cap, Rfl: 1  •  gabapentin (NEURONTIN) 600 MG tablet, Take 1 Tab by mouth 3 times a day., Disp: 90 Tab, Rfl: 3  •  methocarbamol (ROBAXIN) 500 MG Tab, Take 2 Tabs by mouth 4 times a day., Disp: 236 Tab, Rfl: 4  •  oxyCODONE-acetaminophen (PERCOCET-10)  MG Tab, Take 1 Tab by mouth., Disp: , Rfl: 0  •  potassium chloride SA (KDUR) 20 MEQ Tab CR, TAKE 1 TABLET BY MOUTH ONCE DAILY, Disp: 100 Tab, Rfl: 1  •  furosemide (LASIX) 40 MG Tab, Take 1 Tab by mouth every day., Disp: 90 Tab, Rfl: 4  •  Calcium Carbonate-Vitamin D (CALCIUM 600 + D) 600-400 MG-UNIT Tab, Take 1 Tab by mouth 3 times a day., Disp: , Rfl:   •  Omega-3 Fatty Acids (FISH OIL) 1000 MG  "Cap capsule, Take 1,000 mg by mouth every day., Disp: , Rfl:   •  Multiple Vitamins-Minerals (ONE-A-DAY WOMENS 50 PLUS PO), Take 1 tablet by mouth every day., Disp: , Rfl:   •  non-formulary med, Take 3 Tabs by mouth 2 Times a Day. Collagen Advanced Formula, Disp: , Rfl:   •  ferrous sulfate 325 (65 Fe) MG tablet, Take 1 Tab by mouth every morning with breakfast., Disp: 30 Tab, Rfl: 1  •  Misc. Devices Misc, Adult briefs XXL (FEMALE) FOR URINARY INCONTINENCE- ANY BRAND PER PT PREFERENCE OR INSURANC ALLOWANCE; TO CHANGE QID, Disp: 120 Each, Rfl: 5  •  ascorbic acid (VITAMIN C) 500 MG tablet, Take 1 Tab by mouth every morning with breakfast. (Patient not taking: Reported on 1/15/2020), Disp: 30 Tab, Rfl: 3  •  cyanocobalamin (VITAMIN B12) 1000 MCG Tab, Take 1 Tab by mouth every day., Disp: 90 Tab, Rfl: 4      Allergies:  Patient has no known allergies.    ROS  Constitutional: Negative for fever, chills and malaise/fatigue.   HENT: Negative for congestion and sore throat.    Eyes: Negative for blurred vision, double vision and photophobia.   Respiratory: Negative for cough and +asthmatic shortness of breath.  Cardiovascular: Negative for chest pain and palpitations.   Gastrointestinal: Negative for heartburn, nausea, vomiting, abdominal pain, diarrhea and constipation.   Genitourinary: Negative for dysuria and flank pain.   Musculoskeletal: POS for right hip joint pain and myalgias.   Skin: Negative for itching and rash.   Neurological: Negative for dizziness, tingling and headaches.   Endo/Heme/Allergies: Does not bruise/bleed easily.   Psychiatric/Behavioral: Negative for depression. The patient is not nervous/anxious.           Objective:     /78 (BP Location: Right arm, Patient Position: Sitting)   Pulse (!) 106   Temp 36.7 °C (98.1 °F)   Resp 16   Ht 1.6 m (5' 3\")   Wt 111.6 kg (246 lb)   LMP  (LMP Unknown)   SpO2 94%   BMI 43.58 kg/m²      Physical Exam  Musculoskeletal:      Right hip: She exhibits " decreased range of motion, decreased strength and tenderness. She exhibits no bony tenderness, no swelling, no crepitus, no deformity and no laceration.        Legs:            Constitutional: PT is oriented to person, place, and time. PT appears well-developed and well-nourished. No distress.   HENT:   Head: Normocephalic and atraumatic.   Mouth/Throat: Oropharynx is clear and moist. No oropharyngeal exudate.   Eyes: Conjunctivae normal and EOM are normal. Pupils are equal, round, and reactive to light.   Neck: Normal range of motion. Neck supple. No thyromegaly present.   Cardiovascular: Normal rate, regular rhythm, normal heart sounds and intact distal pulses.  Exam reveals no gallop and no friction rub.    No murmur heard.  Pulmonary/Chest: Effort normal and breath sounds normal. No respiratory distress. PT has mild bibasilar wheezes. PT has no rales. Pt exhibits no tenderness.   Abdominal: Soft. Bowel sounds are normal. PT exhibits no distension and no mass. There is no tenderness. There is no rebound and no guarding.   Neurological: PT is alert and oriented to person, place, and time. PT has normal reflexes. No cranial nerve deficit.   Skin: Skin is warm and dry. No rash noted. PT is not diaphoretic. No erythema.       Psychiatric: PT has a normal mood and affect. PT behavior is normal. Judgment and thought content normal.     RADS:    DX-HIP-COMPLETE - UNILATERAL 2+ RIGHT   Order: [id number]   Status:  Final result   Visible to patient:  No (Not Released)   Next appt:  06/10/2020 at 03:[facility] (Micky Rubin M.D.)   Dx:  Right hip pain   Details     Reading Physician Reading Date Result Priority   Eris Apodaca M.D.  [phone] 3/9/2020       Narrative & Impression        3/9/2020 11:59 AM     HISTORY/REASON FOR EXAM:  Pain after trauma.     TECHNIQUE/EXAM DESCRIPTION AND NUMBER OF VIEWS:  2 views of the RIGHT hip.     COMPARISON: 1/2/2020     FINDINGS:     BONE MINERALIZATION:  Normal.  JOINTS: Bilateral total hip arthroplasties. One of the acetabular screws in the right extends into the soft tissues. One of the left acetabular component screws also breaches the cortex of the left ilium. This is stable since prior study.  FRACTURE: None.  DISLOCATION: None.  SOFT TISSUES: No mass. Surgical clips projecting over the abdomen/pelvis. Bowel staple lines in the right lower abdomen.     IMPRESSION:        1. No acute fracture or dislocation.  2. No change in bilateral hip arthroplasties.            Last Resulted: 03/09/20 12:25 PM                  Assessment/Plan:       1. Right hip pain    - DX-HIP-COMPLETE - UNILATERAL 2+ RIGHT; Future    2. History of total replacement of both hip joints      3. Mild intermittent asthma with exacerbation    Continue PROAIR inhaler for asthma  RICE therapy discussed  Gentle ROM exercises discussed  WBAT RLE  Ice/heat therapy discussed  OTC ibuprofen for pain control  Rest, fluids encouraged.  AVS with medical info given.  Pt was in full understanding and agreement with the plan.  Follow-up as needed if symptoms worsen or fail to improve.

## 2020-03-09 NOTE — PATIENT INSTRUCTIONS
Hip Pain  Your hip is the joint between your upper legs and your lower pelvis. The bones, cartilage, tendons, and muscles of your hip joint perform a lot of work each day supporting your body weight and allowing you to move around.  Hip pain can range from a minor ache to severe pain in one or both of your hips. Pain may be felt on the inside of the hip joint near the groin, or the outside near the buttocks and upper thigh. You may have swelling or stiffness as well.  Follow these instructions at home:  · Take medicines only as directed by your health care provider.  · Apply ice to the injured area:  ¨ Put ice in a plastic bag.  ¨ Place a towel between your skin and the bag.  ¨ Leave the ice on for 15-20 minutes at a time, 3-4 times a day.  · Keep your leg raised (elevated) when possible to lessen swelling.  · Avoid activities that cause pain.  · Follow specific exercises as directed by your health care provider.  · Sleep with a pillow between your legs on your most comfortable side.  · Record how often you have hip pain, the location of the pain, and what it feels like.  Contact a health care provider if:  · You are unable to put weight on your leg.  · Your hip is red or swollen or very tender to touch.  · Your pain or swelling continues or worsens after 1 week.  · You have increasing difficulty walking.  · You have a fever.  Get help right away if:  · You have fallen.  · You have a sudden increase in pain and swelling in your hip.  This information is not intended to replace advice given to you by your health care provider. Make sure you discuss any questions you have with your health care provider.  Document Released: 06/07/2011 Document Revised: 05/25/2017 Document Reviewed: 08/14/2014  [employer] Interactive Patient Education © 2017 [employer]

## 2020-03-09 NOTE — LETTER
March 9, 2020       Patient: Karine Ovalle   YOB: 1965   Date of Visit: 3/9/2020         To Whom It May Concern:    It is my medical opinion that Karine Ovalle may be excused from work for the date of 3/9/20.      If you have any questions or concerns, please don't hesitate to call [phone]          Sincerely,          Dejon Valentino P.A.-C.  Electronically Signed

## 2020-05-21 ENCOUNTER — HOSPITAL ENCOUNTER (OUTPATIENT)
Facility: MEDICAL CENTER | Age: 55
End: 2020-05-21
Attending: NURSE PRACTITIONER
Payer: COMMERCIAL

## 2020-05-21 ENCOUNTER — OFFICE VISIT (OUTPATIENT)
Dept: URGENT CARE | Facility: CLINIC | Age: 55
End: 2020-05-21
Payer: COMMERCIAL

## 2020-05-21 VITALS
SYSTOLIC BLOOD PRESSURE: 112 MMHG | WEIGHT: 242 LBS | DIASTOLIC BLOOD PRESSURE: 80 MMHG | TEMPERATURE: 98.6 F | OXYGEN SATURATION: 98 % | BODY MASS INDEX: 42.88 KG/M2 | RESPIRATION RATE: 22 BRPM | HEART RATE: 108 BPM | HEIGHT: 63 IN

## 2020-05-21 DIAGNOSIS — L08.9 WOUND INFECTION: ICD-10-CM

## 2020-05-21 DIAGNOSIS — T14.8XXA WOUND INFECTION: ICD-10-CM

## 2020-05-21 PROCEDURE — 87070 CULTURE OTHR SPECIMN AEROBIC: CPT

## 2020-05-21 PROCEDURE — 90715 TDAP VACCINE 7 YRS/> IM: CPT | Performed by: NURSE PRACTITIONER

## 2020-05-21 PROCEDURE — 99214 OFFICE O/P EST MOD 30 MIN: CPT | Mod: 25 | Performed by: NURSE PRACTITIONER

## 2020-05-21 PROCEDURE — 90471 IMMUNIZATION ADMIN: CPT | Performed by: NURSE PRACTITIONER

## 2020-05-21 PROCEDURE — 87205 SMEAR GRAM STAIN: CPT

## 2020-05-21 PROCEDURE — 87077 CULTURE AEROBIC IDENTIFY: CPT

## 2020-05-21 PROCEDURE — 87186 SC STD MICRODIL/AGAR DIL: CPT

## 2020-05-21 RX ORDER — CEPHALEXIN 500 MG/1
500 CAPSULE ORAL 4 TIMES DAILY
Status: SHIPPED | COMMUNITY
Start: 2020-05-19 | End: 2020-05-21

## 2020-05-21 RX ORDER — SULFAMETHOXAZOLE AND TRIMETHOPRIM 800; 160 MG/1; MG/1
1 TABLET ORAL 2 TIMES DAILY
Qty: 20 TAB | Refills: 0 | Status: SHIPPED
Start: 2020-05-21 | End: 2020-05-21

## 2020-05-21 RX ORDER — SULFAMETHOXAZOLE AND TRIMETHOPRIM 800; 160 MG/1; MG/1
1 TABLET ORAL 2 TIMES DAILY
Qty: 20 TAB | Refills: 0 | Status: SHIPPED | OUTPATIENT
Start: 2020-05-21 | End: 2020-06-10

## 2020-05-21 ASSESSMENT — ENCOUNTER SYMPTOMS
HEARTBURN: 0
CHILLS: 0
MEMORY LOSS: 0
PALPITATIONS: 0
COUGH: 0
SHORTNESS OF BREATH: 0
DIARRHEA: 0
VOMITING: 0
FEVER: 0
CONSTIPATION: 0
ORTHOPNEA: 0
WHEEZING: 0
NAUSEA: 0
TINGLING: 0
DIZZINESS: 0
MYALGIAS: 0
SORE THROAT: 0
HEADACHES: 0
BACK PAIN: 0

## 2020-05-21 ASSESSMENT — FIBROSIS 4 INDEX: FIB4 SCORE: 0.64

## 2020-05-21 NOTE — PROGRESS NOTES
"Subjective:      Karine Ovalle is a 55 y.o. female who presents with Puncture Wound (right lower leg, x2 days )            Patient presents to  with c/o wound infection after she punctured her leg on vacuum  2 days ago.   She has hx of chronic lymphedema and is seen by vascular and lymphedema providers. Those records are not available today. Patient reports she has been having significant drainage out of the puncture wound. She denies pain, fever, chills.  She reports her vein MD told her to massage the drainage and started her on Keflex which has not improved her symptoms.  She does have hx of right knee replacement.     Puncture Wound    The incident occurred 2 days ago. The laceration is located on the right leg. Size: 0.5 cm. Injury mechanism: Vaccum . The pain is at a severity of 0/10. The patient is experiencing no pain. She reports no foreign bodies present. Her tetanus status is out of date.       Review of Systems   Constitutional: Negative for chills and fever.   HENT: Negative for ear pain and sore throat.    Respiratory: Negative for cough, shortness of breath and wheezing.    Cardiovascular: Negative for chest pain, palpitations, orthopnea and leg swelling.   Gastrointestinal: Negative for constipation, diarrhea, heartburn, nausea and vomiting.   Musculoskeletal: Negative for back pain, joint pain and myalgias.   Skin: Negative for rash.        Puncture wound: RLE   Neurological: Negative for dizziness, tingling and headaches.   Psychiatric/Behavioral: Negative for memory loss and suicidal ideas.   All other systems reviewed and are negative.         Objective:     /80   Pulse (!) 108   Temp 37 °C (98.6 °F) (Temporal)   Resp (!) 22   Ht 1.6 m (5' 3\")   Wt 109.8 kg (242 lb)   LMP  (LMP Unknown)   SpO2 98%   BMI 42.87 kg/m²      Physical Exam  Vitals signs reviewed.   Constitutional:       General: She is not in acute distress.     Appearance: She is obese. She is not " ill-appearing or diaphoretic.   HENT:      Head: Normocephalic and atraumatic.      Right Ear: External ear normal.      Left Ear: External ear normal.   Eyes:      Pupils: Pupils are equal, round, and reactive to light.   Neck:      Musculoskeletal: Normal range of motion and neck supple.   Cardiovascular:      Rate and Rhythm: Normal rate and regular rhythm.      Pulses: Normal pulses.      Heart sounds: No friction rub. No gallop.    Pulmonary:      Effort: Pulmonary effort is normal. No respiratory distress.      Breath sounds: Normal breath sounds.   Abdominal:      General: Bowel sounds are normal. There is no distension.      Palpations: Abdomen is soft. There is no mass.   Musculoskeletal: Normal range of motion.         General: No tenderness.      Right lower leg: She exhibits swelling. She exhibits no tenderness and no bony tenderness. Edema present.      Left lower leg: Edema present.        Legs:       Comments: RLE:  There is chronic lymphedema. Approx: 0.5 cm puncture wound on medial calf with surrounding erythema and warmth.  There is significant amount of serous drainage.  Non tender to palpation.    Skin:     General: Skin is warm and dry.      Findings: No erythema.   Neurological:      Mental Status: She is alert and oriented to person, place, and time.   Psychiatric:         Mood and Affect: Mood normal.         Behavior: Behavior normal.                 Assessment/Plan:       1. Wound infection  Due to TKA hardware in the knee and no improvement on keflex, we will start bactrim at this time. Patient verbalized understanding. She will f/u with vein specialist as scheduled. Return precautions and wound care provided.      - Tdap Vaccine =>8YO IM  - CULTURE WOUND W/ GRAM STAIN  - sulfamethoxazole-trimethoprim (BACTRIM DS) 800-160 MG tablet; Take 1 Tab by mouth 2 times a day.  Dispense: 20 Tab; Refill: 0      Instructed to return to  or nearest emergency department if symptoms fail to improve,  for any change in condition, further concerns, or new concerning symptoms.  Patient states understanding of the plan of care and discharge instructions.

## 2020-05-21 NOTE — LETTER
May 21, 2020         Patient: Karine Ovalle   YOB: 1965   Date of Visit: 5/21/2020           To Whom it May Concern:    Karine Ovalle was seen in my clinic on 5/21/2020. She may return to work on 5/22/2020.    If you have any questions or concerns, please don't hesitate to call.        Sincerely,           ANNA Olivo.  Electronically Signed

## 2020-05-22 LAB
GRAM STN SPEC: NORMAL
SIGNIFICANT IND 70042: NORMAL
SITE SITE: NORMAL
SOURCE SOURCE: NORMAL

## 2020-05-24 LAB
BACTERIA WND AEROBE CULT: ABNORMAL
BACTERIA WND AEROBE CULT: ABNORMAL
GRAM STN SPEC: ABNORMAL
SIGNIFICANT IND 70042: ABNORMAL
SITE SITE: ABNORMAL
SOURCE SOURCE: ABNORMAL

## 2020-05-25 DIAGNOSIS — L08.9 WOUND INFECTION: ICD-10-CM

## 2020-05-25 DIAGNOSIS — T14.8XXA WOUND INFECTION: ICD-10-CM

## 2020-05-25 RX ORDER — CIPROFLOXACIN 500 MG/1
500 TABLET, FILM COATED ORAL 2 TIMES DAILY
Qty: 10 TAB | Refills: 0 | Status: SHIPPED | OUTPATIENT
Start: 2020-05-25 | End: 2020-05-30

## 2020-05-26 NOTE — RESULT ENCOUNTER NOTE
Can you please try to reach this patient.I tried twice to call this patient about her culture results and see how she was doing but got disconnected.       Her culture shows pseudomonas which requires Cipro and I have sent that to her pharmacy.  She should f/u with her vascular and lymphedema providers about this as well.     Thanks Araseli

## 2020-06-03 ENCOUNTER — TELEPHONE (OUTPATIENT)
Dept: URGENT CARE | Facility: CLINIC | Age: 55
End: 2020-06-03

## 2020-06-03 NOTE — TELEPHONE ENCOUNTER
I left a message asking to return my call regarding a recent visit, to Carmella at [facility] #[phone].

## 2020-06-03 NOTE — TELEPHONE ENCOUNTER
----- Message from ADITYA Olivo sent at 5/26/2020  1:44 PM PDT -----  Can you please try to reach this patient.I tried twice to call this patient about her culture results and see how she was doing but got disconnected.       Her culture shows pseudomonas which requires Cipro and I have sent that to her pharmacy.  She should f/u with her vascular and lymphedema providers about this as well.     Thanks Araseli

## 2020-06-10 ENCOUNTER — OFFICE VISIT (OUTPATIENT)
Dept: MEDICAL GROUP | Age: 55
End: 2020-06-10
Payer: COMMERCIAL

## 2020-06-10 VITALS
DIASTOLIC BLOOD PRESSURE: 82 MMHG | SYSTOLIC BLOOD PRESSURE: 124 MMHG | BODY MASS INDEX: 42.84 KG/M2 | OXYGEN SATURATION: 98 % | HEIGHT: 63 IN | HEART RATE: 100 BPM | TEMPERATURE: 98.9 F | WEIGHT: 241.8 LBS

## 2020-06-10 DIAGNOSIS — M54.42 CHRONIC BILATERAL LOW BACK PAIN WITH BILATERAL SCIATICA: ICD-10-CM

## 2020-06-10 DIAGNOSIS — R29.898 MUSCULAR DECONDITIONING: ICD-10-CM

## 2020-06-10 DIAGNOSIS — L97.911: ICD-10-CM

## 2020-06-10 DIAGNOSIS — Z00.00 HEALTHCARE MAINTENANCE: ICD-10-CM

## 2020-06-10 DIAGNOSIS — Z12.31 ENCOUNTER FOR SCREENING MAMMOGRAM FOR BREAST CANCER: ICD-10-CM

## 2020-06-10 DIAGNOSIS — E66.01 OBESITY, MORBID, BMI 40.0-49.9 (HCC): ICD-10-CM

## 2020-06-10 DIAGNOSIS — E61.1 IRON DEFICIENCY: ICD-10-CM

## 2020-06-10 DIAGNOSIS — E55.9 VITAMIN D DEFICIENCY: ICD-10-CM

## 2020-06-10 DIAGNOSIS — G89.29 CHRONIC BILATERAL LOW BACK PAIN WITH BILATERAL SCIATICA: ICD-10-CM

## 2020-06-10 DIAGNOSIS — R73.01 IFG (IMPAIRED FASTING GLUCOSE): ICD-10-CM

## 2020-06-10 DIAGNOSIS — Z12.11 SCREENING FOR COLORECTAL CANCER: ICD-10-CM

## 2020-06-10 DIAGNOSIS — M54.41 CHRONIC BILATERAL LOW BACK PAIN WITH BILATERAL SCIATICA: ICD-10-CM

## 2020-06-10 DIAGNOSIS — I10 ESSENTIAL HYPERTENSION: ICD-10-CM

## 2020-06-10 DIAGNOSIS — Z12.12 SCREENING FOR COLORECTAL CANCER: ICD-10-CM

## 2020-06-10 DIAGNOSIS — E53.8 VITAMIN B 12 DEFICIENCY: ICD-10-CM

## 2020-06-10 PROBLEM — E66.9 OBESITY (BMI 30-39.9): Status: RESOLVED | Noted: 2018-06-07 | Resolved: 2020-06-10

## 2020-06-10 PROCEDURE — 99214 OFFICE O/P EST MOD 30 MIN: CPT | Performed by: INTERNAL MEDICINE

## 2020-06-10 RX ORDER — GABAPENTIN 600 MG/1
1200 TABLET ORAL 2 TIMES DAILY
Qty: 120 TAB | Refills: 5 | Status: SHIPPED | OUTPATIENT
Start: 2020-06-10 | End: 2020-08-20

## 2020-06-10 RX ORDER — PHENTERMINE HYDROCHLORIDE 37.5 MG/1
37.5 CAPSULE ORAL EVERY MORNING
Qty: 90 CAP | Refills: 0 | Status: SHIPPED | OUTPATIENT
Start: 2020-06-10 | End: 2020-08-20 | Stop reason: SDUPTHER

## 2020-06-10 ASSESSMENT — ENCOUNTER SYMPTOMS
NEUROLOGICAL NEGATIVE: 1
EYES NEGATIVE: 1
RESPIRATORY NEGATIVE: 1
PSYCHIATRIC NEGATIVE: 1
CARDIOVASCULAR NEGATIVE: 1
CONSTITUTIONAL NEGATIVE: 1
MUSCULOSKELETAL NEGATIVE: 1
GASTROINTESTINAL NEGATIVE: 1

## 2020-06-10 ASSESSMENT — PATIENT HEALTH QUESTIONNAIRE - PHQ9: CLINICAL INTERPRETATION OF PHQ2 SCORE: 0

## 2020-06-10 ASSESSMENT — FIBROSIS 4 INDEX: FIB4 SCORE: 0.64

## 2020-06-11 NOTE — PROGRESS NOTES
Subjective:      Karine Ovalle is a 55 y.o. female who presents with Follow-Up; Obesity; and Wound Check (urgent care 5/21/2020)  The patient is here for followup of chronic medical problems listed below. The patient is compliant with medications and having no side effects from them. Denies chest pain, abdominal pain, dyspnea, myalgias, or cough.   Patient Active Problem List    Diagnosis Date Noted   • Mild intermittent asthma without complication 04/25/2012     Priority: High   • Primary osteoarthritis of both knees- sp right TKR 2015; left TKR tbd 2020- dr nowak 06/04/2019   • Administrative encounter- RTC ACCESS/ADA PARATRANSIT ELIGIBILITY 06/04/2019   • S/P hip replacement, bilateral 02/13/2019   • Primary insomnia 01/03/2019   • Mixed stress and urge urinary incontinence 01/03/2019   • Edema 12/13/2018   • Vitamin D deficiency 12/13/2018   • Microcytic anemia- postop THR 2/2019 12/13/2018   • Cervical myelopathy (HCC) 12/11/2018   • Left hip pain 09/18/2018   • Encounter for work capability assessment- FMLA PAPERWORK 08/29/2018   • DDD (degenerative disc disease), cervical- MOD-SEVERE SPINE NV- dr brennan; fusion and laminectomy C3-T1 12/5/2018 dr brennan 08/09/2018   • Primary osteoarthritis of both hips- dr nowak; left THR done feb 6, 2019; right THR 3/2018 06/07/2018   • DDD (degenerative disc disease), lumbar 04/25/2018   • Uncomplicated opioid dependence (CMS-[facility])- spine nv 01/31/2018   • Essential hypertension 01/31/2018   • Venous insufficiency 10/03/2017   • Colon cancer screening- needs 08/09/2017   • Chronic bilateral low back pain with bilateral sciatica- pain mgt;    spine nv 07/26/2017   • Obesity, morbid, BMI 40.0-49.9 (CMS-[facility])- s/p gastric bypass 10/21/2016   • Vitamin B 12 deficiency 06/02/2016   • History of gastric bypass 04/25/2012     ,No Known Allergies  .  Outpatient Medications Prior to Visit   Medication Sig Dispense Refill   • morphine ER (MS CONTIN) 15 MG Tab CR tablet      •  Albuterol Sulfate (PROAIR RESPICLICK) 108 (90 Base) MCG/ACT AEROSOL POWDER, BREATH ACTIVATED Inhale 1 Puff by mouth every 6 hours as needed (Wheezing). 1 Each 0   • gabapentin (NEURONTIN) 600 MG tablet Take 1 Tab by mouth 3 times a day. 90 Tab 3   • methocarbamol (ROBAXIN) 500 MG Tab Take 2 Tabs by mouth 4 times a day. 236 Tab 4   • oxyCODONE-acetaminophen (PERCOCET-10)  MG Tab Take 1 Tab by mouth.  0   • potassium chloride SA (KDUR) 20 MEQ Tab CR TAKE 1 TABLET BY MOUTH ONCE DAILY 100 Tab 1   • furosemide (LASIX) 40 MG Tab Take 1 Tab by mouth every day. 90 Tab 4   • Calcium Carbonate-Vitamin D (CALCIUM 600 + D) 600-400 MG-UNIT Tab Take 1 Tab by mouth 3 times a day.     • Omega-3 Fatty Acids (FISH OIL) 1000 MG Cap capsule Take 1,000 mg by mouth every day.     • Multiple Vitamins-Minerals (ONE-A-DAY WOMENS 50 PLUS PO) Take 1 tablet by mouth every day.     • non-formulary med Take 3 Tabs by mouth 2 Times a Day. Collagen Advanced Formula     • ferrous sulfate 325 (65 Fe) MG tablet Take 1 Tab by mouth every morning with breakfast. 30 Tab 1   • Misc. Devices Misc Adult briefs XXL (FEMALE) FOR URINARY INCONTINENCE- ANY BRAND PER PT PREFERENCE OR INSURANC ALLOWANCE; TO CHANGE  Each 5   • ascorbic acid (VITAMIN C) 500 MG tablet Take 1 Tab by mouth every morning with breakfast. 30 Tab 3   • cyanocobalamin (VITAMIN B12) 1000 MCG Tab Take 1 Tab by mouth every day. 90 Tab 4   • sulfamethoxazole-trimethoprim (BACTRIM DS) 800-160 MG tablet Take 1 Tab by mouth 2 times a day. (Patient not taking: Reported on 6/10/2020) 20 Tab 0   • benzonatate (TESSALON PERLES) 100 MG Cap Take 1 Cap by mouth 3 times a day as needed for Cough. (Patient not taking: Reported on 6/10/2020) 30 Cap 0   • benzonatate (TESSALON) 200 MG capsule Take 1 Cap by mouth 3 times a day as needed for Cough. (Patient not taking: Reported on 6/10/2020) 30 Cap 0     No facility-administered medications prior to visit.      Hospital Outpatient Visit on  05/21/2020   Component Date Value   • Significant Indicator 05/21/2020 POS*   • Source 05/21/2020 WND    • Site 05/21/2020 Right Leg    • Culture Result 05/21/2020 -*   • Gram Stain Result 05/21/2020 No organisms seen.    • Culture Result 05/21/2020 *                    Value:Pseudomonas aeruginosa  Moderate growth  P.aeruginosa may develop resistance during prolonged therapy  with all antibiotics. Isolates that are initially susceptible  may become resistant within three to four days after  initiation of therapy. Testing of repeat isolates may be  warranted.     • Significant Indicator 05/21/2020 .    • Source 05/21/2020 WND    • Site 05/21/2020 Right Leg    • Gram Stain Result 05/21/2020 No organisms seen.       Lab Results   Component Value Date/Time    HBA1C 5.0 02/16/2019 05:52 AM    HBA1C 5.9 (H) 12/12/2018 06:00 AM     Lab Results   Component Value Date/Time    SODIUM 139 02/16/2019 05:52 AM    POTASSIUM 3.6 02/16/2019 05:52 AM    CHLORIDE 108 02/16/2019 05:52 AM    CO2 27 02/16/2019 05:52 AM    GLUCOSE 105 (H) 02/16/2019 05:52 AM    BUN 12 02/16/2019 05:52 AM    CREATININE 0.39 (L) 02/16/2019 05:52 AM    CREATININE 0.88 08/14/2010 12:00 AM    BUNCREATRAT 13 08/14/2010 12:00 AM    GLOMRATE >59 08/14/2010 12:00 AM    ALKPHOSPHAT 94 02/16/2019 05:52 AM    ASTSGOT 14 02/16/2019 05:52 AM    ALTSGPT 6 02/16/2019 05:52 AM    TBILIRUBIN 0.3 02/16/2019 05:52 AM     Lab Results   Component Value Date/Time    INR 1.00 02/13/2019 11:44 AM    INR 1.01 12/04/2018 11:15 AM    INR 1.04 05/18/2012 12:20 PM     Lab Results   Component Value Date/Time    CHOLSTRLTOT 168 02/22/2018 05:05 PM    LDL 84 02/22/2018 05:05 PM    HDL 67 02/22/2018 05:05 PM    TRIGLYCERIDE 85 02/22/2018 05:05 PM       No results found for: TESTOSTERONE  Lab Results   Component Value Date/Time    TSH 2.110 08/14/2010 12:00 AM     Lab Results   Component Value Date/Time    FREET4 0.72 05/03/2016 07:32 AM     Lab Results   Component Value Date/Time     "URICACID 5.6 08/12/2014 04:49 PM     No components found for: VITB12  Lab Results   Component Value Date/Time    25HYDROXY 17 (L) 02/15/2019 03:30 AM    25HYDROXY 14 (L) 12/12/2018 06:00 AM               HPI    Review of Systems   Constitutional: Negative.    HENT: Negative.    Eyes: Negative.    Respiratory: Negative.    Cardiovascular: Negative.    Gastrointestinal: Negative.    Genitourinary: Negative.    Musculoskeletal: Negative.    Skin: Negative.    Neurological: Negative.    Endo/Heme/Allergies: Negative.    Psychiatric/Behavioral: Negative.           Objective:     /82 (BP Location: Left arm, Patient Position: Sitting, BP Cuff Size: Adult)   Pulse 100   Temp 37.2 °C (98.9 °F) (Temporal)   Ht 1.6 m (5' 3\")   Wt 109.7 kg (241 lb 12.8 oz)   LMP  (LMP Unknown)   SpO2 98%   BMI 42.83 kg/m²       Physical Exam  Vitals signs reviewed.   Constitutional:       General: She is not in acute distress.     Appearance: She is well-developed. She is not diaphoretic.   HENT:      Head: Normocephalic and atraumatic.      Right Ear: External ear normal.      Left Ear: External ear normal.      Nose: Nose normal.      Mouth/Throat:      Pharynx: No oropharyngeal exudate.   Eyes:      General: No scleral icterus.        Right eye: No discharge.         Left eye: No discharge.      Conjunctiva/sclera: Conjunctivae normal.      Pupils: Pupils are equal, round, and reactive to light.   Neck:      Musculoskeletal: Normal range of motion and neck supple.      Thyroid: No thyromegaly.      Vascular: No JVD.      Trachea: No tracheal deviation.   Cardiovascular:      Rate and Rhythm: Normal rate and regular rhythm.      Heart sounds: Normal heart sounds. No murmur. No friction rub. No gallop.    Pulmonary:      Effort: Pulmonary effort is normal. No respiratory distress.      Breath sounds: Normal breath sounds. No stridor. No wheezing or rales.   Chest:      Chest wall: No tenderness.   Abdominal:      General: Bowel " sounds are normal. There is no distension.      Palpations: Abdomen is soft. There is no mass.      Tenderness: There is no abdominal tenderness. There is no guarding or rebound.   Musculoskeletal: Normal range of motion.         General: No tenderness.   Lymphadenopathy:      Cervical: No cervical adenopathy.   Skin:     General: Skin is warm and dry.      Coloration: Skin is not pale.      Findings: No erythema or rash.   Neurological:      Mental Status: She is alert and oriented to person, place, and time.      Cranial Nerves: No cranial nerve deficit.      Motor: No abnormal muscle tone.      Coordination: Coordination normal.      Deep Tendon Reflexes: Reflexes are normal and symmetric. Reflexes normal.   Psychiatric:         Behavior: Behavior normal.         Thought Content: Thought content normal.         Judgment: Judgment normal.                 Assessment/Plan:       1. Healthcare maintenance     - TSH; Future  - Comp Metabolic Panel; Future  - Lipid Profile; Future  - CBC WITH DIFFERENTIAL; Future    2. Vasculitic ulcer of lower extremity, limited to breakdown of skin, right (HCC)     - REFERRAL TO WOUND CLINIC  - REFERRAL TO GENERAL SURGERY    3. Obesity, morbid, BMI 40.0-49.9 (CMS-[facility])- s/p gastric bypass    diet/exercise/lose 15 lbs.; patient counseled    - phentermine 37.5 MG capsule; Take 1 Cap by mouth every morning for 90 days.  Dispense: 90 Cap; Refill: 0  - TSH; Future    4. Chronic bilateral low back pain with bilateral sciatica- pain mgt;    spine nv-not at goal.  Increase gabapentin to 600 mg 4 times daily (1200 mg twice daily)     - gabapentin (NEURONTIN) 600 MG tablet; Take 2 Tabs by mouth 2 times a day.  Dispense: 120 Tab; Refill: 5    5. Vitamin B 12 deficiency  Needs recheck.  Labs ordered continue 1000  micrograms vitamin B12 daily  - VITAMIN B12; Future  - FOLATE; Future    6. Essential hypertension   Under good control. Continue same regimen.    - Comp Metabolic Panel; Future  -  Lipid Profile; Future  - CBC WITH DIFFERENTIAL; Future    7. IFG (impaired fasting glucose)-rule out development of true diabetes.  Check A1c.   diet/exercise/lose 15 lbs.; patient counseled         - HEMOGLOBIN A1C; Future    8. Iron deficiency  Needs follow-up.  Check iron studies.  Begin iron supplements.  325 mg iron sulfate daily.  - IRON/TOTAL IRON BIND; Future  - FERRITIN; Future    9. Vitamin D deficiency continue 1200 units of vitamin D3 daily.  Check level     - VITAMIN D,25 HYDROXY; Future    10. Muscular deconditioning     - REFERRAL TO PHYSICAL THERAPY Reason for Therapy: Eval/Treat/Report    11. Encounter for screening mammogram for breast cancer     - MA-SCREENING MAMMO BILAT W/CAD; Future    12. Screening for colorectal cancer     - REFERRAL TO GI FOR COLONOSCOPY  - COLOGUARD (FIT DNA)

## 2020-06-29 ENCOUNTER — PATIENT MESSAGE (OUTPATIENT)
Dept: MEDICAL GROUP | Age: 55
End: 2020-06-29

## 2020-06-29 DIAGNOSIS — M51.36 DDD (DEGENERATIVE DISC DISEASE), LUMBAR: ICD-10-CM

## 2020-06-29 DIAGNOSIS — M50.30 DDD (DEGENERATIVE DISC DISEASE), CERVICAL: ICD-10-CM

## 2020-06-29 RX ORDER — METHOCARBAMOL 500 MG/1
1000 TABLET, FILM COATED ORAL 4 TIMES DAILY
Qty: 236 TAB | Refills: 4 | Status: SHIPPED | OUTPATIENT
Start: 2020-06-29 | End: 2020-12-28 | Stop reason: SDUPTHER

## 2020-07-23 ENCOUNTER — OFFICE VISIT (OUTPATIENT)
Dept: MEDICAL GROUP | Age: 55
End: 2020-07-23
Payer: COMMERCIAL

## 2020-07-23 VITALS
TEMPERATURE: 97.8 F | WEIGHT: 250.6 LBS | SYSTOLIC BLOOD PRESSURE: 136 MMHG | DIASTOLIC BLOOD PRESSURE: 88 MMHG | BODY MASS INDEX: 44.4 KG/M2 | OXYGEN SATURATION: 98 % | HEART RATE: 101 BPM | HEIGHT: 63 IN

## 2020-07-23 DIAGNOSIS — E66.01 OBESITY, MORBID, BMI 40.0-49.9 (HCC): ICD-10-CM

## 2020-07-23 DIAGNOSIS — J45.20 MILD INTERMITTENT ASTHMA WITHOUT COMPLICATION: ICD-10-CM

## 2020-07-23 DIAGNOSIS — E53.8 VITAMIN B 12 DEFICIENCY: ICD-10-CM

## 2020-07-23 DIAGNOSIS — I10 ESSENTIAL HYPERTENSION: ICD-10-CM

## 2020-07-23 DIAGNOSIS — E55.9 VITAMIN D DEFICIENCY: ICD-10-CM

## 2020-07-23 DIAGNOSIS — Z00.8 ENCOUNTER FOR WORK CAPABILITY ASSESSMENT: ICD-10-CM

## 2020-07-23 PROCEDURE — 99214 OFFICE O/P EST MOD 30 MIN: CPT | Performed by: INTERNAL MEDICINE

## 2020-07-23 ASSESSMENT — ENCOUNTER SYMPTOMS
GASTROINTESTINAL NEGATIVE: 1
RESPIRATORY NEGATIVE: 1
CONSTITUTIONAL NEGATIVE: 1
CARDIOVASCULAR NEGATIVE: 1
NEUROLOGICAL NEGATIVE: 1
PSYCHIATRIC NEGATIVE: 1
EYES NEGATIVE: 1
MUSCULOSKELETAL NEGATIVE: 1

## 2020-07-23 ASSESSMENT — FIBROSIS 4 INDEX: FIB4 SCORE: 0.64

## 2020-07-23 NOTE — PROGRESS NOTES
Subjective:      Karine Ovalle is a 55 y.o. female who presents with Paperwork (FMLA)  The patient is here for followup of chronic medical problems listed below. The patient is compliant with medications and having no side effects from them. Denies chest pain, abdominal pain, dyspnea, myalgias, or cough.   Patient Active Problem List    Diagnosis Date Noted   • Mild intermittent asthma without complication 04/25/2012     Priority: High   • Primary osteoarthritis of both knees- sp right TKR 2015; left TKR tbd 2020- dr nowak 06/04/2019   • Administrative encounter- RTC ACCESS/ADA PARATRANSIT ELIGIBILITY 06/04/2019   • S/P hip replacement, bilateral 02/13/2019   • Primary insomnia 01/03/2019   • Mixed stress and urge urinary incontinence 01/03/2019   • Edema 12/13/2018   • Vitamin D deficiency 12/13/2018   • Microcytic anemia- postop THR 2/2019 12/13/2018   • Cervical myelopathy (HCC) 12/11/2018   • Left hip pain 09/18/2018   • Encounter for work capability assessment- FMLA PAPERWORK 08/29/2018   • DDD (degenerative disc disease), cervical- MOD-SEVERE SPINE NV- dr brennan; fusion and laminectomy C3-T1 12/5/2018 dr brennan 08/09/2018   • Primary osteoarthritis of both hips- dr nowak; left THR done feb 6, 2019; right THR 3/2018 06/07/2018   • DDD (degenerative disc disease), lumbar 04/25/2018   • Uncomplicated opioid dependence (CMS-[facility])- spine nv 01/31/2018   • Essential hypertension 01/31/2018   • Venous insufficiency 10/03/2017   • Colon cancer screening- needs 08/09/2017   • Chronic bilateral low back pain with bilateral sciatica- pain mgt;    spine nv 07/26/2017   • Obesity, morbid, BMI 40.0-49.9 (CMS-[facility])- s/p gastric bypass 10/21/2016   • Vitamin B 12 deficiency 06/02/2016   • History of gastric bypass 04/25/2012     No Known Allergies  Outpatient Medications Prior to Visit   Medication Sig Dispense Refill   • methocarbamol (ROBAXIN) 500 MG Tab Take 2 Tabs by mouth 4 times a day. 236 Tab 4   • phentermine 37.5  MG capsule Take 1 Cap by mouth every morning for 90 days. 90 Cap 0   • gabapentin (NEURONTIN) 600 MG tablet Take 2 Tabs by mouth 2 times a day. 120 Tab 5   • morphine ER (MS CONTIN) 15 MG Tab CR tablet      • Albuterol Sulfate (PROAIR RESPICLICK) 108 (90 Base) MCG/ACT AEROSOL POWDER, BREATH ACTIVATED Inhale 1 Puff by mouth every 6 hours as needed (Wheezing). 1 Each 0   • gabapentin (NEURONTIN) 600 MG tablet Take 1 Tab by mouth 3 times a day. 90 Tab 3   • oxyCODONE-acetaminophen (PERCOCET-10)  MG Tab Take 1 Tab by mouth.  0   • potassium chloride SA (KDUR) 20 MEQ Tab CR TAKE 1 TABLET BY MOUTH ONCE DAILY 100 Tab 1   • furosemide (LASIX) 40 MG Tab Take 1 Tab by mouth every day. 90 Tab 4   • Calcium Carbonate-Vitamin D (CALCIUM 600 + D) 600-400 MG-UNIT Tab Take 1 Tab by mouth 3 times a day.     • Omega-3 Fatty Acids (FISH OIL) 1000 MG Cap capsule Take 1,000 mg by mouth every day.     • Multiple Vitamins-Minerals (ONE-A-DAY WOMENS 50 PLUS PO) Take 1 tablet by mouth every day.     • ferrous sulfate 325 (65 Fe) MG tablet Take 1 Tab by mouth every morning with breakfast. 30 Tab 1   • Misc. Devices Misc Adult briefs XXL (FEMALE) FOR URINARY INCONTINENCE- ANY BRAND PER PT PREFERENCE OR INSURANC ALLOWANCE; TO CHANGE  Each 5   • ascorbic acid (VITAMIN C) 500 MG tablet Take 1 Tab by mouth every morning with breakfast. 30 Tab 3   • cyanocobalamin (VITAMIN B12) 1000 MCG Tab Take 1 Tab by mouth every day. 90 Tab 4   • non-formulary med Take 3 Tabs by mouth 2 Times a Day. Collagen Advanced Formula       No facility-administered medications prior to visit.      No visits with results within 1 Month(s) from this visit.   Latest known visit with results is:   Hospital Outpatient Visit on 05/21/2020   Component Date Value   • Significant Indicator 05/21/2020 POS*   • Source 05/21/2020 WND    • Site 05/21/2020 Right Leg    • Culture Result 05/21/2020 -*   • Gram Stain Result 05/21/2020 No organisms seen.    • Culture Result  05/21/2020 *                    Value:Pseudomonas aeruginosa  Moderate growth  P.aeruginosa may develop resistance during prolonged therapy  with all antibiotics. Isolates that are initially susceptible  may become resistant within three to four days after  initiation of therapy. Testing of repeat isolates may be  warranted.     • Significant Indicator 05/21/2020 .    • Source 05/21/2020 WND    • Site 05/21/2020 Right Leg    • Gram Stain Result 05/21/2020 No organisms seen.       Lab Results   Component Value Date/Time    HBA1C 5.0 02/16/2019 05:52 AM    HBA1C 5.9 (H) 12/12/2018 06:00 AM     Lab Results   Component Value Date/Time    SODIUM 139 02/16/2019 05:52 AM    POTASSIUM 3.6 02/16/2019 05:52 AM    CHLORIDE 108 02/16/2019 05:52 AM    CO2 27 02/16/2019 05:52 AM    GLUCOSE 105 (H) 02/16/2019 05:52 AM    BUN 12 02/16/2019 05:52 AM    CREATININE 0.39 (L) 02/16/2019 05:52 AM    CREATININE 0.88 08/14/2010 12:00 AM    BUNCREATRAT 13 08/14/2010 12:00 AM    GLOMRATE >59 08/14/2010 12:00 AM    ALKPHOSPHAT 94 02/16/2019 05:52 AM    ASTSGOT 14 02/16/2019 05:52 AM    ALTSGPT 6 02/16/2019 05:52 AM    TBILIRUBIN 0.3 02/16/2019 05:52 AM     Lab Results   Component Value Date/Time    INR 1.00 02/13/2019 11:44 AM    INR 1.01 12/04/2018 11:15 AM    INR 1.04 05/18/2012 12:20 PM     Lab Results   Component Value Date/Time    CHOLSTRLTOT 168 02/22/2018 05:05 PM    LDL 84 02/22/2018 05:05 PM    HDL 67 02/22/2018 05:05 PM    TRIGLYCERIDE 85 02/22/2018 05:05 PM       No results found for: TESTOSTERONE  Lab Results   Component Value Date/Time    TSH 2.110 08/14/2010 12:00 AM     Lab Results   Component Value Date/Time    FREET4 0.72 05/03/2016 07:32 AM     Lab Results   Component Value Date/Time    URICACID 5.6 08/12/2014 04:49 PM     No components found for: VITB12  Lab Results   Component Value Date/Time    25HYDROXY 17 (L) 02/15/2019 03:30 AM    25HYDROXY 14 (L) 12/12/2018 06:00 AM               HPI    Review of Systems  "  Constitutional: Negative.    HENT: Negative.    Eyes: Negative.    Respiratory: Negative.    Cardiovascular: Negative.    Gastrointestinal: Negative.    Genitourinary: Negative.    Musculoskeletal: Negative.    Skin: Negative.    Neurological: Negative.    Endo/Heme/Allergies: Negative.    Psychiatric/Behavioral: Negative.           Objective:     /88 (BP Location: Right arm, Patient Position: Sitting, BP Cuff Size: Adult)   Pulse (!) 101   Temp 36.6 °C (97.8 °F) (Temporal)   Ht 1.6 m (5' 3\")   Wt 113.7 kg (250 lb 9.6 oz)   LMP  (LMP Unknown)   SpO2 98%   BMI 44.39 kg/m²      Physical Exam  Vitals signs and nursing note reviewed.   Constitutional:       General: She is not in acute distress.     Appearance: She is well-developed. She is not diaphoretic.   HENT:      Head: Normocephalic and atraumatic.      Right Ear: External ear normal.      Left Ear: External ear normal.      Nose: Nose normal.      Mouth/Throat:      Pharynx: No oropharyngeal exudate.   Eyes:      General: No scleral icterus.        Right eye: No discharge.         Left eye: No discharge.      Conjunctiva/sclera: Conjunctivae normal.      Pupils: Pupils are equal, round, and reactive to light.   Neck:      Musculoskeletal: Normal range of motion and neck supple.      Thyroid: No thyromegaly.      Vascular: No JVD.      Trachea: No tracheal deviation.   Cardiovascular:      Rate and Rhythm: Normal rate and regular rhythm.      Heart sounds: Normal heart sounds. No murmur. No friction rub. No gallop.    Pulmonary:      Effort: Pulmonary effort is normal. No respiratory distress.      Breath sounds: Normal breath sounds. No stridor. No wheezing or rales.   Chest:      Chest wall: No tenderness.   Abdominal:      General: Bowel sounds are normal. There is no distension.      Palpations: Abdomen is soft. There is no mass.      Tenderness: There is no abdominal tenderness. There is no guarding or rebound.      Hernia: No hernia is " present.   Musculoskeletal: Normal range of motion.         General: No tenderness.   Lymphadenopathy:      Cervical: No cervical adenopathy.   Skin:     General: Skin is warm and dry.      Coloration: Skin is not pale.      Findings: No erythema or rash.   Neurological:      Mental Status: She is alert and oriented to person, place, and time.      Cranial Nerves: No cranial nerve deficit.      Motor: No abnormal muscle tone.      Coordination: Coordination normal.      Deep Tendon Reflexes: Reflexes are normal and symmetric. Reflexes normal.   Psychiatric:         Behavior: Behavior normal.         Thought Content: Thought content normal.         Judgment: Judgment normal.                  Assessment/Plan:       1. Encounter for work capability assessment- FMLA forms          Paperwork completed.  Patient currently working 10-hour days 4 days a week, but needs frequent time off due to flareups of her joint and muscle pains from her degenerative arthritis and DDD and diffuse myalgias.  She does not have polymyalgi rheumatica as her sed rate is normal.      Patient and I had a long discussion regarding her future disability needs and it is determined that she will need to be off at least 1 day (10 hours) a week, 52 weeks a year.  Was also determined that she may need to be 30-60 minutes late on a daily basis due to occasional flareups of her myalgias and arthralgias.  It was also determined and agreed upon that she is a candidate to work from home remotely if necessary, or as needed.    2. Obesity, morbid, BMI 40.0-49.9 (CMS-[facility])- s/p gastric bypass      diet/exercise/lose 15 lbs.; patient counseled      3. Essential hypertension   Under good control. Continue same regimen.      4. Mild intermittent asthma without complication     Under good control. Continue same regimen.    5. Vitamin B 12 deficiency     Under good control. Continue same regimen.    6. Vitamin D deficiency     Under good control. Continue same  regimen.

## 2020-08-05 ENCOUNTER — OFFICE VISIT (OUTPATIENT)
Dept: URGENT CARE | Facility: CLINIC | Age: 55
End: 2020-08-05
Payer: COMMERCIAL

## 2020-08-05 VITALS
WEIGHT: 250 LBS | HEIGHT: 63 IN | SYSTOLIC BLOOD PRESSURE: 126 MMHG | OXYGEN SATURATION: 100 % | BODY MASS INDEX: 44.3 KG/M2 | DIASTOLIC BLOOD PRESSURE: 76 MMHG | HEART RATE: 97 BPM | TEMPERATURE: 97.6 F | RESPIRATION RATE: 14 BRPM

## 2020-08-05 DIAGNOSIS — M54.50 LUMBAR PAIN: ICD-10-CM

## 2020-08-05 PROCEDURE — 99214 OFFICE O/P EST MOD 30 MIN: CPT | Performed by: NURSE PRACTITIONER

## 2020-08-05 RX ORDER — KETOROLAC TROMETHAMINE 30 MG/ML
60 INJECTION, SOLUTION INTRAMUSCULAR; INTRAVENOUS ONCE
Status: COMPLETED | OUTPATIENT
Start: 2020-08-05 | End: 2020-08-05

## 2020-08-05 RX ADMIN — KETOROLAC TROMETHAMINE 60 MG: 30 INJECTION, SOLUTION INTRAMUSCULAR; INTRAVENOUS at 10:00

## 2020-08-05 ASSESSMENT — ENCOUNTER SYMPTOMS
WEAKNESS: 0
NUMBNESS: 0
HIP PAIN: 1
FEVER: 0
BACK PAIN: 1

## 2020-08-05 ASSESSMENT — FIBROSIS 4 INDEX: FIB4 SCORE: 0.64

## 2020-08-05 NOTE — LETTER
August 6, 2020         Patient: Karine Ovalle   YOB: 1965   Date of Visit: 8/5/2020           To Whom it May Concern:    Karine Ovalle was seen in my clinic on 8/5/2020. She may return to work on 8/6/2020.    If you have any questions or concerns, please don't hesitate to call.        Sincerely,           ANNA Connor.  Electronically Signed

## 2020-08-05 NOTE — LETTER
August 6, 2020         Patient: Karine Ovalle   YOB: 1965   Date of Visit: 8/5/2020           To Whom it May Concern:    Karine Ovalle was seen in my clinic on 8/5/2020. She may return to work on 08/07/2020.    If you have any questions or concerns, please don't hesitate to call.        Sincerely,           ANNA Connor.  Electronically Signed

## 2020-08-05 NOTE — PROGRESS NOTES
"  Subjective:     Karine Ovalle is a 55 y.o. female who presents for Hip Pain (right side)      Woke up with lower right lumbar pain. Has Ice pack in place. Pinching pain. No pain in to leg or hip. Pain 10/10. Took percocet and morphine. States she needs new mattress. No strenuous activity or possible injury. Requesting toradol shot. No hx of renal disease.          Hip Pain  This is a new problem. The current episode started today. The problem occurs constantly. The problem has been gradually worsening. Pertinent negatives include no fever, numbness, rash or weakness. The symptoms are aggravated by walking. She has tried ice for the symptoms. The treatment provided mild relief.       Past Medical History:   Diagnosis Date   • Anemia    • Arthritis     osteo/hips and back   • At risk for falls    • Chronic back pain    • Dental disorder     lower denture   • Pain 12/04/2018    \"ALL OVER\", 10/10   • Pain 02/04/2019    arthritic pain   • Urinary incontinence     using pads       Past Surgical History:   Procedure Laterality Date   • HIP ARTHROPLASTY TOTAL Left 2/13/2019    Procedure: HIP ARTHROPLASTY TOTAL, Cabling of intra-operative calcar fracture;  Surgeon: Bryon Levine M.D.;  Location: [facility];  Service: Orthopedics   • CERVICAL FUSION POSTERIOR  12/7/2018    Procedure: CERVICAL FUSION POSTERIOR- STAGE #2 C3-5 AND C3-T1;  Surgeon: Emre Mendoza III, M.D.;  Location: [facility];  Service: Neurosurgery   • CERVICAL LAMINECTOMY POSTERIOR  12/7/2018    Procedure: CERVICAL LAMINECTOMY POSTERIOR C2-T1;  Surgeon: Emre Mendoza III, M.D.;  Location: [facility];  Service: Neurosurgery   • CERVICAL DISK AND FUSION ANTERIOR  12/5/2018    Procedure: CERVICAL DISK AND FUSION ANTERIOR-STAGE #1 C4-5;  Surgeon: Emre Mendoza III, M.D.;  Location: [facility];  Service: Neurosurgery   • CORPECTOMY  12/5/2018    Procedure: CORPECTOMY;  Surgeon: Emre Mendoza III, " "M.D.;  Location: SURGERY [facility];  Service: Neurosurgery   • HIP ARTHROPLASTY TOTAL Right 3/20/2018    Procedure: HIP ARTHROPLASTY TOTAL;  Surgeon: Bryon Levine M.D.;  Location: SURGERY [facility];  Service: Orthopedics   • KNEE ARTHROPLASTY TOTAL Right 10/20/2015    Procedure: KNEE ARTHROPLASTY TOTAL;  Surgeon: Bryon Levine M.D.;  Location: SURGERY [facility];  Service:    • APPENDECTOMY LAPAROSCOPIC  3/21/2013    Performed by Dali Queen M.D. at SURGERY [facility]   • DEBRIDEMENT  5/17/2012    Performed by BRADLEY DYKES at SURGERY [facility]   • PLASTIC SURGERY  2004    excess skin on arms and legs removed   • MAMMOPLASTY AUGMENTATION Bilateral 2004    breast implants and \"tummy tuck\"   • GASTRIC BYPASS LAPAROSCOPIC  2002    x 2   • ABDOMINAL EXPLORATION         Social History     Socioeconomic History   • Marital status: Single     Spouse name: Not on file   • Number of children: Not on file   • Years of education: Not on file   • Highest education level: Not on file   Occupational History   • Not on file   Social Needs   • Financial resource strain: Not on file   • Food insecurity     Worry: Not on file     Inability: Not on file   • Transportation needs     Medical: Not on file     Non-medical: Not on file   Tobacco Use   • Smoking status: Never Smoker   • Smokeless tobacco: Never Used   Substance and Sexual Activity   • Alcohol use: Yes     Comment: 3-4 per week; pt stops alcohol use 1-week ago   • Drug use: No     Frequency: 4.0 times per week   • Sexual activity: Never   Lifestyle   • Physical activity     Days per week: Not on file     Minutes per session: Not on file   • Stress: Not on file   Relationships   • Social connections     Talks on phone: Not on file     Gets together: Not on file     Attends [employer] service: Not on file     Active member of club or organization: Not on file     Attends meetings of clubs or organizations: Not on file     Relationship " "status: Not on file   • Intimate partner violence     Fear of current or ex partner: Not on file     Emotionally abused: Not on file     Physically abused: Not on file     Forced sexual activity: Not on file   Other Topics Concern   • Not on file   Social History Narrative    ** Merged History Encounter **             Family History   Problem Relation Age of Onset   • Diabetes Mother    • Heart Disease Mother         No Known Allergies    Review of Systems   Constitutional: Negative for fever.   Musculoskeletal: Positive for back pain.   Skin: Negative for rash.   Neurological: Negative for weakness and numbness.   All other systems reviewed and are negative.       Objective:   /76 (BP Location: Right arm, Patient Position: Sitting)   Pulse 97   Temp 36.4 °C (97.6 °F)   Resp 14   Ht 1.6 m (5' 3\")   Wt 113.4 kg (250 lb)   LMP  (LMP Unknown)   SpO2 100%   BMI 44.29 kg/m²     Physical Exam  Vitals signs reviewed.   Constitutional:       General: She is not in acute distress.     Appearance: She is well-developed.   HENT:      Head: Normocephalic and atraumatic.      Right Ear: External ear normal.      Left Ear: External ear normal.      Nose: Nose normal.   Eyes:      Conjunctiva/sclera: Conjunctivae normal.   Neck:      Musculoskeletal: Normal range of motion.   Cardiovascular:      Rate and Rhythm: Normal rate.   Pulmonary:      Effort: Pulmonary effort is normal.   Musculoskeletal:         General: Tenderness present.      Lumbar back: She exhibits tenderness. She exhibits no bony tenderness, no swelling, no edema and no deformity.      Comments: Right lumbar paraspinal tenderness to palpation. Grimacing with ROM.   Skin:     General: Skin is warm and dry.      Findings: No rash.   Neurological:      General: No focal deficit present.      Mental Status: She is alert and oriented to person, place, and time.      GCS: GCS eye subscore is 4. GCS verbal subscore is 5. GCS motor subscore is 6.      " Comments: Steady gait with support of cane.    Psychiatric:         Mood and Affect: Mood normal.         Speech: Speech normal.         Behavior: Behavior normal.         Thought Content: Thought content normal.         Judgment: Judgment normal.         Assessment/Plan:   1. Lumbar pain  - ketorolac (TORADOL) injection 60 mg  -Can take OTC Tylenol or Ibuprofen as directed for pain.   -Temperature Therapy: Heat or ice, whichever feels better.  -Gentle ROM back stretches and exercises.   -Resume activity as tolerated.    Follow up with your primary care doctor. Follow up for persistent, new, or worsening pain. Emergently for weakness, loss of bowel or bladder, groin pain or numbness, fevers.    Differential diagnosis, natural history, supportive care, and indications for immediate follow-up discussed.

## 2020-08-05 NOTE — PATIENT INSTRUCTIONS
Acute Back Pain, Adult  Acute back pain is sudden and usually short-lived. It is often caused by an injury to the muscles and tissues in the back. The injury may result from:  · A muscle or ligament getting overstretched or torn (strained). Ligaments are tissues that connect bones to each other. Lifting something improperly can cause a back strain.  · Wear and tear (degeneration) of the spinal disks. Spinal disks are circular tissue that provides cushioning between the bones of the spine (vertebrae).  · Twisting motions, such as while playing sports or doing yard work.  · A hit to the back.  · Arthritis.  You may have a physical exam, lab tests, and imaging tests to find the cause of your pain. Acute back pain usually goes away with rest and home care.  Follow these instructions at home:  Managing pain, stiffness, and swelling  · Take over-the-counter and prescription medicines only as told by your health care provider.  · Your health care provider may recommend applying ice during the first 24-48 hours after your pain starts. To do this:  ? Put ice in a plastic bag.  ? Place a towel between your skin and the bag.  ? Leave the ice on for 20 minutes, 2-3 times a day.  · If directed, apply heat to the affected area as often as told by your health care provider. Use the heat source that your health care provider recommends, such as a moist heat pack or a heating pad.  ? Place a towel between your skin and the heat source.  ? Leave the heat on for 20-30 minutes.  ? Remove the heat if your skin turns bright red. This is especially important if you are unable to feel pain, heat, or cold. You have a greater risk of getting burned.  Activity    · Do not stay in bed. Staying in bed for more than 1-2 days can delay your recovery.  · Sit up and stand up straight. Avoid leaning forward when you sit, or hunching over when you stand.  ? If you work at a desk, sit close to it so you do not need to lean over. Keep your chin tucked  "in. Keep your neck drawn back, and keep your elbows bent at a right angle. Your arms should look like the letter \"L.\"  ? Sit high and close to the steering wheel when you drive. Add lower back (lumbar) support to your car seat, if needed.  · Take short walks on even surfaces as soon as you are able. Try to increase the length of time you walk each day.  · Do not sit, drive, or  one place for more than 30 minutes at a time. Sitting or standing for long periods of time can put stress on your back.  · Do not drive or use heavy machinery while taking prescription pain medicine.  · Use proper lifting techniques. When you bend and lift, use positions that put less stress on your back:  ? Bend your knees.  ? Keep the load close to your body.  ? Avoid twisting.  · Exercise regularly as told by your health care provider. Exercising helps your back heal faster and helps prevent back injuries by keeping muscles strong and flexible.  · Work with a physical therapist to make a safe exercise program, as recommended by your health care provider. Do any exercises as told by your physical therapist.  Lifestyle  · Maintain a healthy weight. Extra weight puts stress on your back and makes it difficult to have good posture.  · Avoid activities or situations that make you feel anxious or stressed. Stress and anxiety increase muscle tension and can make back pain worse. Learn ways to manage anxiety and stress, such as through exercise.  General instructions  · Sleep on a firm mattress in a comfortable position. Try lying on your side with your knees slightly bent. If you lie on your back, put a pillow under your knees.  · Follow your treatment plan as told by your health care provider. This may include:  ? Cognitive or behavioral therapy.  ? Acupuncture or massage therapy.  ? Meditation or yoga.  Contact a health care provider if:  · You have pain that is not relieved with rest or medicine.  · You have increasing pain going down " into your legs or buttocks.  · Your pain does not improve after 2 weeks.  · You have pain at night.  · You lose weight without trying.  · You have a fever or chills.  Get help right away if:  · You develop new bowel or bladder control problems.  · You have unusual weakness or numbness in your arms or legs.  · You develop nausea or vomiting.  · You develop abdominal pain.  · You feel faint.  Summary  · Acute back pain is sudden and usually short-lived.  · Use proper lifting techniques. When you bend and lift, use positions that put less stress on your back.  · Take over-the-counter and prescription medicines and apply heat or ice as directed by your health care provider.  This information is not intended to replace advice given to you by your health care provider. Make sure you discuss any questions you have with your health care provider.  Document Released: 12/18/2006 Document Revised: 04/07/2020 Document Reviewed: 08/01/2018  Elsevier Patient Education © 2020 [employer]  Sciatica    Sciatica is pain, numbness, weakness, or tingling along the path of the sciatic nerve. The sciatic nerve starts in the lower back and runs down the back of each leg. The nerve controls the muscles in the lower leg and in the back of the knee. It also provides feeling (sensation) to the back of the thigh, the lower leg, and the sole of the foot. Sciatica is a symptom of another medical condition that pinches or puts pressure on the sciatic nerve.  Sciatica most often only affects one side of the body. Sciatica usually goes away on its own or with treatment. In some cases, sciatica may come back (recur).  What are the causes?  This condition is caused by pressure on the sciatic nerve or pinching of the nerve. This may be the result of:  · A disk in between the bones of the spine bulging out too far (herniated disk).  · Age-related changes in the spinal disks.  · A pain disorder that affects a muscle in the buttock.  · Extra bone  growth near the sciatic nerve.  · A break (fracture) of the pelvis.  · Pregnancy.  · Tumor. This is rare.  What increases the risk?  The following factors may make you more likely to develop this condition:  · Playing sports that place pressure or stress on the spine.  · Having poor strength and flexibility.  · A history of back injury or surgery.  · Sitting for long periods of time.  · Doing activities that involve repetitive bending or lifting.  · Obesity.  What are the signs or symptoms?  Symptoms can vary from mild to very severe, and they may include:  · Any of these problems in the lower back, leg, hip, or buttock:  ? Mild tingling, numbness, or dull aches.  ? Burning sensations.  ? Sharp pains.  · Numbness in the back of the calf or the sole of the foot.  · Leg weakness.  · Severe back pain that makes movement difficult.  Symptoms may get worse when you cough, sneeze, or laugh, or when you sit or stand for long periods of time.  How is this diagnosed?  This condition may be diagnosed based on:  · Your symptoms and medical history.  · A physical exam.  · Blood tests.  · Imaging tests, such as:  ? X-rays.  ? MRI.  ? CT scan.  How is this treated?  In many cases, this condition improves on its own without treatment. However, treatment may include:  · Reducing or modifying physical activity.  · Exercising and stretching.  · Icing and applying heat to the affected area.  · Medicines that help to:  ? Relieve pain and swelling.  ? Relax your muscles.  · Injections of medicines that help to relieve pain, irritation, and inflammation around the sciatic nerve (steroids).  · Surgery.  Follow these instructions at home:  Medicines  · Take over-the-counter and prescription medicines only as told by your health care provider.  · Ask your health care provider if the medicine prescribed to you:  ? Requires you to avoid driving or using heavy machinery.  ? Can cause constipation. You may need to take these actions to prevent  or treat constipation:  § Drink enough fluid to keep your urine pale yellow.  § Take over-the-counter or prescription medicines.  § Eat foods that are high in fiber, such as beans, whole grains, and fresh fruits and vegetables.  § Limit foods that are high in fat and processed sugars, such as fried or sweet foods.  Managing pain         · If directed, put ice on the affected area.  ? Put ice in a plastic bag.  ? Place a towel between your skin and the bag.  ? Leave the ice on for 20 minutes, 2-3 times a day.  · If directed, apply heat to the affected area. Use the heat source that your health care provider recommends, such as a moist heat pack or a heating pad.  ? Place a towel between your skin and the heat source.  ? Leave the heat on for 20-30 minutes.  ? Remove the heat if your skin turns bright red. This is especially important if you are unable to feel pain, heat, or cold. You may have a greater risk of getting burned.  Activity    · Return to your normal activities as told by your health care provider. Ask your health care provider what activities are safe for you.  · Avoid activities that make your symptoms worse.  · Take brief periods of rest throughout the day.  ? When you rest for longer periods, mix in some mild activity or stretching between periods of rest. This will help to prevent stiffness and pain.  ? Avoid sitting for long periods of time without moving. Get up and move around at least one time each hour.  · Exercise and stretch regularly, as told by your health care provider.  · Do not lift anything that is heavier than 10 lb (4.5 kg) while you have symptoms of sciatica. When you do not have symptoms, you should still avoid heavy lifting, especially repetitive heavy lifting.  · When you lift objects, always use proper lifting technique, which includes:  ? Bending your knees.  ? Keeping the load close to your body.  ? Avoiding twisting.  General instructions  · Maintain a healthy weight. Excess  weight puts extra stress on your back.  · Wear supportive, comfortable shoes. Avoid wearing high heels.  · Avoid sleeping on a mattress that is too soft or too hard. A mattress that is firm enough to support your back when you sleep may help to reduce your pain.  · Keep all follow-up visits as told by your health care provider. This is important.  Contact a health care provider if:  · You have pain that:  ? Wakes you up when you are sleeping.  ? Gets worse when you lie down.  ? Is worse than you have experienced in the past.  ? Lasts longer than 4 weeks.  · You have an unexplained weight loss.  Get help right away if:  · You are not able to control when you urinate or have bowel movements (incontinence).  · You have:  ? Weakness in your lower back, pelvis, buttocks, or legs that gets worse.  ? Redness or swelling of your back.  ? A burning sensation when you urinate.  Summary  · Sciatica is pain, numbness, weakness, or tingling along the path of the sciatic nerve.  · This condition is caused by pressure on the sciatic nerve or pinching of the nerve.  · Sciatica can cause pain, numbness, or tingling in the lower back, legs, hips, and buttocks.  · Treatment often includes rest, exercise, medicines, and applying ice or heat.  This information is not intended to replace advice given to you by your health care provider. Make sure you discuss any questions you have with your health care provider.  Document Released: 12/12/2002 Document Revised: 01/06/2020 Document Reviewed: 01/06/2020  Elsevier Patient Education © 2020 [employer]

## 2020-08-06 NOTE — PROGRESS NOTES
[facility]ENC[facility]PARTMENT ENCOUNTER    Basic Information  Patient: Isa Zuniga Age: 19 year old Sex: female  MRN: [id number] Encounter Date: 8/5/2020, 8:06 PM  PCP: Christopher Omer MD    Chief Complaint  Chief Complaint   Patient presents with   • Dizziness       History of Present Illness    19 yr female presents to ER after passing out while at work; pt states she had eaten today an hour prior to this episode.  She states this is never happened to her in the past.  Does admit to very painful menstrual cramps that started today.  Any recent travel.  She is on birth control pills.  Denies smoking.  She denies any aura prior to this happening.  She denies any bowel or urine incontinence.  states now she is feeling much better.  Denies any exposure to COVID.      I have reviewed the non physician practitioners documentation, personally taken the patient's history, performed an exam and agree with the physical findings, diagnosis and management plan.      Orders  Medications   sodium chloride (NORMAL SALINE) 0.9 % bolus 1,000 mL (0 mLs Intravenous Completed 8/5/20 2104)         Laboratory and Radiology Results    Results for orders placed or performed during the hospital encounter of 08/05/20   Urine Pregnancy Test Clinitek Status (POC)   Result Value Ref Range    HCG Point of Care NEGATIVE NEGATIVE   CBC with Automated Differential   Result Value Ref Range    WBC 8.6 4.2 - 11.0 K/mcL    RBC 4.75 4.00 - 5.20 mil/mcL    HGB 14.0 12.0 - 15.5 g/dL    HCT 42.2 36.0 - 46.5 %    MCV 88.8 78.0 - 100.0 fl    MCH 29.5 26.0 - 34.0 pg    MCHC 33.2 32.0 - 36.5 g/dL    RDW-CV 12.4 11.0 - 15.0 %     140 - 450 K/mcL    NRBC 0 0 /100 WBC    DIFF TYPE AUTOMATED DIFFERENTIAL     Neutrophil 58 %    LYMPH 33 %    MONO 6 %    EOSIN 2 %    BASO 1 %    Percent Immature Granuloctyes 0 %    Absolute Neutrophil 5.0 1.8 - 8.0 K/mcL    Absolute Lymph 2.8 1.2 - 5.2 K/mcL    Absolute Mono 0.5 0.3 - 0.9 K/mcL    Absolute  Pts heart rate ranging from 115-145. On call provider notified of this change. Provider was informed of potassium level of 3.2. New orders for 1 time dose of potassium 20mEq was placed. Monitoring pt closely.   Eos 0.2 0.1 - 0.5 K/mcL    Absolute Baso 0.1 0.0 - 0.3 K/mcL    Absolute Immature Granulocytes 0.0 0 - 0.2 K/mcl   Comprehensive Metabolic Panel   Result Value Ref Range    Sodium 141 135 - 145 mmol/L    Potassium 3.7 3.4 - 5.1 mmol/L    Chloride 112 (H) 98 - 107 mmol/L    Carbon Dioxide 19 (L) 21 - 32 mmol/L    Anion Gap 14 10 - 20 mmol/L    Glucose 88 65 - 99 mg/dL    BUN 13 6 - 20 mg/dL    Creatinine 0.91 0.51 - 0.95 mg/dL    GFR Estimate,  >90     GFR Estimate, Non African American >90     BUN/Creatinine Ratio 14 7 - 25    CALCIUM 8.9 8.4 - 10.2 mg/dL    TOTAL BILIRUBIN 0.5 0.2 - 1.0 mg/dL    AST/SGOT 9 <38 Units/L    ALT/SGPT 14 <64 Units/L    ALK PHOSPHATASE 57 42 - 110 Units/L    TOTAL PROTEIN 8.2 6.4 - 8.2 g/dL    Albumin 4.5 3.6 - 5.1 g/dL    GLOBULIN 3.7 2.0 - 4.0 g/dL    A/G Ratio, Serum 1.2 1.0 - 2.4   Troponin I Ultra Sensitive   Result Value Ref Range    TROPONIN I <0.02 <0.05 ng/mL   Thyroid Stimulating Hormone   Result Value Ref Range    TSH 0.913 0.463 - 4.130 mcUnits/mL   D Dimer, Quantitative   Result Value Ref Range    D Dimer Quantitative 0.24 <0.57 mg/L (FEU)       CT HEAD WO CONTRAST   Final Result      Normal noncontrast CT scan of the brain.        Electronically Signed by: SAAD BISHOP M.D.    Signed on: 8/5/2020 8:39 PM          XR CHEST PA OR AP 1 VIEW   Final Result   FINDINGS/IMPRESSION:        No focal consolidation, pneumothorax or pleural effusion.      Normal cardiac size.      Electronically Signed by: SAAD BISHOP M.D.    Signed on: 8/5/2020 8:16 PM                Physical Exam  ED Triage Vitals [08/05/20 1937]   /84   Heart Rate 64   Resp (!) 23   Temp 99.1 °F (37.3 °C)   SpO2 100 %     General:  No acute distress. Alert.  Skin:  Warm. Dry. Intact.  Head:  Normocephalic. Atraumatic.  Eye: EOMI. Normal conjunctiva.  ENMT:  Oral mucosa moist.   Cardiovascular:  Regular rate and rhythm.  Respiratory:   Respirations are non-labored.  Extremities:   No cyanosis, no edema.  Neurological:  Alert; No focal neurological deficit observed. Normal speech.   Psychiatric: Cooperative. Appropriate mood and affect.        ED Course  I participated in the following activities of this patients care: the medical history, the physical exam, medical decision making.   I personally performed: supervision of the patient's care.   The case was discussed with: the non physician practitioners, Midlevel Provider.   Evaluation and management service: I agree with the evaluation and management decisions made in this patient's care.   Results interpretation: I agree with the study interpretation in this patient's care, History, physical, EMR documentation completed by Midlevel Provider has been reviewed and agree.     Vitals:    08/05/20 2105 08/05/20 2147 08/05/20 2152 08/05/20 2157   BP:  123/80 138/89 (!) 131/94   Pulse: (!) 49 (!) 55 63 61   Resp: 15 14 14 14   Temp:       TempSrc:       SpO2: 100%      Weight:       Height:           MEDICAL DECISION MAKING:    EKG: sinus bradycardia rate of 55 without acute changes.       Impression and Plan    Diagnosis:  1. Syncope, unspecified syncope type        Condition:  STABLE    Disposition:  Discharge 8/5/2020 10:29 PM  Isa Zuniga discharge to home/self care.        Follow Up:  Christopher Omer MD  [address] [address] [phone]    In 2 days         Discharge Instructions were provided    Counseled:  Patient, Regarding diagnosis, Regarding diagnostic results, Regarding treatment, and Patient understood    DISCUSSION OF PLAN AND IMPORTANCE OF FOLLOW-UP CARE:  The plan was discussed verbally and key components were summarized in the discharge instructions. All questions were answered. In discussing management and follow-up, they are advised that the plan is based only on information that was available at the time of emergency department evaluation. Additional testing and treatment may be  necessary, and outpatient evaluation is frequently required to ensure complete care. At the same time, there is always potential for symptoms to recur or worsen, or for additional signs or symptoms to develop that could substantially affect the treatment plan. If any new or uncontrolled symptoms occur, or if there are any other concerns, they are advised to seek medical evaluation immediately.               Narcisa Sim, DO  08/05/20 [id number]

## 2020-08-19 ENCOUNTER — PATIENT MESSAGE (OUTPATIENT)
Dept: MEDICAL GROUP | Age: 55
End: 2020-08-19

## 2020-08-19 DIAGNOSIS — R06.2 WHEEZING: ICD-10-CM

## 2020-08-19 RX ORDER — ALBUTEROL SULFATE 90 UG/1
1 POWDER, METERED RESPIRATORY (INHALATION) EVERY 6 HOURS PRN
Qty: 1 EACH | Refills: 0 | Status: SHIPPED | OUTPATIENT
Start: 2020-08-19 | End: 2020-12-07 | Stop reason: SDUPTHER

## 2020-08-20 ENCOUNTER — TELEMEDICINE (OUTPATIENT)
Dept: MEDICAL GROUP | Age: 55
End: 2020-08-20
Payer: COMMERCIAL

## 2020-08-20 VITALS
WEIGHT: 240 LBS | RESPIRATION RATE: 12 BRPM | HEART RATE: 80 BPM | TEMPERATURE: 98.6 F | HEIGHT: 63 IN | BODY MASS INDEX: 42.52 KG/M2

## 2020-08-20 DIAGNOSIS — M54.41 CHRONIC BILATERAL LOW BACK PAIN WITH BILATERAL SCIATICA: ICD-10-CM

## 2020-08-20 DIAGNOSIS — G89.29 CHRONIC BILATERAL LOW BACK PAIN WITH BILATERAL SCIATICA: ICD-10-CM

## 2020-08-20 DIAGNOSIS — E66.01 OBESITY, MORBID, BMI 40.0-49.9 (HCC): ICD-10-CM

## 2020-08-20 DIAGNOSIS — I10 ESSENTIAL HYPERTENSION: ICD-10-CM

## 2020-08-20 DIAGNOSIS — M54.42 CHRONIC BILATERAL LOW BACK PAIN WITH BILATERAL SCIATICA: ICD-10-CM

## 2020-08-20 PROCEDURE — 99214 OFFICE O/P EST MOD 30 MIN: CPT | Mod: 95,CR | Performed by: INTERNAL MEDICINE

## 2020-08-20 RX ORDER — PHENTERMINE HYDROCHLORIDE 37.5 MG/1
37.5 CAPSULE ORAL EVERY MORNING
Qty: 90 CAP | Refills: 0 | Status: SHIPPED | OUTPATIENT
Start: 2020-08-20 | End: 2020-11-18

## 2020-08-20 ASSESSMENT — FIBROSIS 4 INDEX: FIB4 SCORE: 0.64

## 2020-08-20 NOTE — PROGRESS NOTES
Virtual Visit: Established Patient   This visit was conducted via Zoom  using secure and encrypted videoconferencing technology. The patient was in a private location in the state of [place].    The patient's identity was confirmed and verbal consent was obtained for this virtual visit.    Subjective:   CC:   Chief Complaint   Patient presents with   • Medication Refill     phentermine 37.5 MG capsule        Karine Ovalle is a 55 y.o. female presenting for evaluation and management of:    Medication management and lab review    ROS    Denies any recent fevers or chills. No nausea or vomiting. No chest pains or shortness of breath.     No Known Allergies    Current medicines (including changes today)  Current Outpatient Medications   Medication Sig Dispense Refill   • phentermine 37.5 MG capsule Take 1 Cap by mouth every morning for 90 days. 90 Cap 0   • Albuterol Sulfate (PROAIR RESPICLICK) 108 (90 Base) MCG/ACT AEROSOL POWDER, BREATH ACTIVATED Inhale 1 Puff by mouth every 6 hours as needed (Wheezing). 1 Each 0   • potassium chloride SA (KDUR) 20 MEQ Tab CR Take 1 tablet by mouth once daily 90 Tab 0   • methocarbamol (ROBAXIN) 500 MG Tab Take 2 Tabs by mouth 4 times a day. 236 Tab 4   • morphine ER (MS CONTIN) 15 MG Tab CR tablet      • gabapentin (NEURONTIN) 600 MG tablet Take 1 Tab by mouth 3 times a day. 90 Tab 3   • oxyCODONE-acetaminophen (PERCOCET-10)  MG Tab Take 1 Tab by mouth.  0   • furosemide (LASIX) 40 MG Tab Take 1 Tab by mouth every day. 90 Tab 4   • Calcium Carbonate-Vitamin D (CALCIUM 600 + D) 600-400 MG-UNIT Tab Take 1 Tab by mouth 3 times a day.     • Omega-3 Fatty Acids (FISH OIL) 1000 MG Cap capsule Take 1,000 mg by mouth every day.     • Multiple Vitamins-Minerals (ONE-A-DAY WOMENS 50 PLUS PO) Take 1 tablet by mouth every day.     • non-formulary med Take 3 Tabs by mouth 2 Times a Day. Collagen Advanced Formula     • ferrous sulfate 325 (65 Fe) MG tablet Take 1 Tab by mouth every  morning with breakfast. 30 Tab 1   • Misc. Devices Misc Adult briefs XXL (FEMALE) FOR URINARY INCONTINENCE- ANY BRAND PER PT PREFERENCE OR INSURANC ALLOWANCE; TO CHANGE  Each 5   • ascorbic acid (VITAMIN C) 500 MG tablet Take 1 Tab by mouth every morning with breakfast. 30 Tab 3   • cyanocobalamin (VITAMIN B12) 1000 MCG Tab Take 1 Tab by mouth every day. 90 Tab 4     No current facility-administered medications for this visit.        Patient Active Problem List    Diagnosis Date Noted   • Mild intermittent asthma without complication 04/25/2012     Priority: High   • Primary osteoarthritis of both knees- sp right TKR 2015; left TKR tbd 2020- dr nowak 06/04/2019   • Administrative encounter- RTC ACCESS/ADA PARATRANSIT ELIGIBILITY 06/04/2019   • S/P hip replacement, bilateral 02/13/2019   • Primary insomnia 01/03/2019   • Mixed stress and urge urinary incontinence 01/03/2019   • Edema 12/13/2018   • Vitamin D deficiency 12/13/2018   • Microcytic anemia- postop THR 2/2019 12/13/2018   • Cervical myelopathy (HCC) 12/11/2018   • Left hip pain 09/18/2018   • Encounter for work capability assessment- FMLA PAPERWORK 08/29/2018   • DDD (degenerative disc disease), cervical- MOD-[facility] SPINE [place]- dr brennan; fusion and laminectomy C3-T1 12/5/2018 dr brennan 08/09/2018   • Primary osteoarthritis of both hips- dr nowak; left THR done feb 6, 2019; right THR 3/2018 06/07/2018   • DDD (degenerative disc disease), lumbar 04/25/2018   • Uncomplicated opioid dependence (CMS-[facility])- spine nv 01/31/2018   • Essential hypertension 01/31/2018   • Venous insufficiency 10/03/2017   • Colon cancer screening- needs 08/09/2017   • Chronic bilateral low back pain with bilateral sciatica- pain mgt;    spine nv 07/26/2017   • Obesity, morbid, BMI 40.0-49.9 (CMS-[facility])- s/p gastric bypass 10/21/2016   • Vitamin B 12 deficiency 06/02/2016   • History of gastric bypass 04/25/2012       Family History   Problem Relation Age of Onset   • Diabetes  "Mother    • Heart Disease Mother        She  has a past medical history of Anemia, Arthritis, At risk for falls, Chronic back pain, Dental disorder, Pain (12/04/2018), Pain (02/04/2019), and Urinary incontinence. She also has no past medical history of ASTHMA, Breast cancer (HCC), or Diabetes.  She  has a past surgical history that includes gastric bypass laparoscopic (2002); abdominal exploration; plastic surgery (2004); debridement (5/17/2012); appendectomy laparoscopic (3/21/2013); knee arthroplasty total (Right, 10/20/2015); mammoplasty augmentation (Bilateral, 2004); hip arthroplasty total (Right, 3/20/2018); cervical disk and fusion anterior (12/5/2018); corpectomy (12/5/2018); cervical fusion posterior (12/7/2018); cervical laminectomy posterior (12/7/2018); and hip arthroplasty total (Left, 2/13/2019).       Objective:   Pulse 80   Temp 37 °C (98.6 °F)   Resp 12   Ht 1.6 m (5' 3\")   Wt 108.9 kg (240 lb)   LMP  (LMP Unknown)   BMI 42.51 kg/m²     Physical Exam:Pulse 80   Temp 37 °C (98.6 °F)   Resp 12   Ht 1.6 m (5' 3\")   Wt 108.9 kg (240 lb)   LMP  (LMP Unknown)   BMI 42.51 kg/m²     Constitutional: Alert, no distress, well-groomed.  Skin: No rashes in visible areas.  Eye: Round. Conjunctiva clear, lids normal. No icterus.   ENMT: Lips pink without lesions, good dentition, moist mucous membranes. Phonation normal.  Neck: No masses, no thyromegaly. Moves freely without pain.  Respiratory: Unlabored respiratory effort, no cough or audible wheeze  Psych: Alert and oriented x3, normal affect and mood.   No visits with results within 1 Month(s) from this visit.   Latest known visit with results is:   Hospital Outpatient Visit on 05/21/2020   Component Date Value   • Significant Indicator 05/21/2020 POS*   • Source 05/21/2020 WND    • Site 05/21/2020 Right Leg    • Culture Result 05/21/2020 -*   • Gram Stain Result 05/21/2020 No organisms seen.    • Culture Result 05/21/2020 *                    " Value:Pseudomonas aeruginosa  Moderate growth  P.aeruginosa may develop resistance during prolonged therapy  with all antibiotics. Isolates that are initially susceptible  may become resistant within three to four days after  initiation of therapy. Testing of repeat isolates may be  warranted.     • Significant Indicator 05/21/2020 .    • Source 05/21/2020 WND    • Site 05/21/2020 Right Leg    • Gram Stain Result 05/21/2020 No organisms seen.       Lab Results   Component Value Date/Time    HBA1C 5.0 02/16/2019 05:52 AM    HBA1C 5.9 (H) 12/12/2018 06:00 AM     Lab Results   Component Value Date/Time    SODIUM 139 02/16/2019 05:52 AM    POTASSIUM 3.6 02/16/2019 05:52 AM    CHLORIDE 108 02/16/2019 05:52 AM    CO2 27 02/16/2019 05:52 AM    GLUCOSE 105 (H) 02/16/2019 05:52 AM    BUN 12 02/16/2019 05:52 AM    CREATININE 0.39 (L) 02/16/2019 05:52 AM    CREATININE 0.88 08/14/2010 12:00 AM    BUNCREATRAT 13 08/14/2010 12:00 AM    GLOMRATE >59 08/14/2010 12:00 AM    ALKPHOSPHAT 94 02/16/2019 05:52 AM    ASTSGOT 14 02/16/2019 05:52 AM    ALTSGPT 6 02/16/2019 05:52 AM    TBILIRUBIN 0.3 02/16/2019 05:52 AM     Lab Results   Component Value Date/Time    INR 1.00 02/13/2019 11:44 AM    INR 1.01 12/04/2018 11:15 AM    INR 1.04 05/18/2012 12:20 PM     Lab Results   Component Value Date/Time    CHOLSTRLTOT 168 02/22/2018 05:05 PM    LDL 84 02/22/2018 05:05 PM    HDL 67 02/22/2018 05:05 PM    TRIGLYCERIDE 85 02/22/2018 05:05 PM       No results found for: TESTOSTERONE  Lab Results   Component Value Date/Time    TSH 2.110 08/14/2010 12:00 AM     Lab Results   Component Value Date/Time    FREET4 0.72 05/03/2016 07:32 AM     Lab Results   Component Value Date/Time    URICACID 5.6 08/12/2014 04:49 PM     No components found for: VITB12  Lab Results   Component Value Date/Time    25HYDROXY 17 (L) 02/15/2019 03:30 AM    25HYDROXY 14 (L) 12/12/2018 06:00 AM     Outpatient Medications Prior to Visit   Medication Sig Dispense Refill   •  Albuterol Sulfate (PROAIR RESPICLICK) 108 (90 Base) MCG/ACT AEROSOL POWDER, BREATH ACTIVATED Inhale 1 Puff by mouth every 6 hours as needed (Wheezing). 1 Each 0   • potassium chloride SA (KDUR) 20 MEQ Tab CR Take 1 tablet by mouth once daily 90 Tab 0   • methocarbamol (ROBAXIN) 500 MG Tab Take 2 Tabs by mouth 4 times a day. 236 Tab 4   • morphine ER (MS CONTIN) 15 MG Tab CR tablet      • gabapentin (NEURONTIN) 600 MG tablet Take 1 Tab by mouth 3 times a day. 90 Tab 3   • oxyCODONE-acetaminophen (PERCOCET-10)  MG Tab Take 1 Tab by mouth.  0   • furosemide (LASIX) 40 MG Tab Take 1 Tab by mouth every day. 90 Tab 4   • Calcium Carbonate-Vitamin D (CALCIUM 600 + D) 600-400 MG-UNIT Tab Take 1 Tab by mouth 3 times a day.     • Omega-3 Fatty Acids (FISH OIL) 1000 MG Cap capsule Take 1,000 mg by mouth every day.     • Multiple Vitamins-Minerals (ONE-A-DAY WOMENS 50 PLUS PO) Take 1 tablet by mouth every day.     • non-formulary med Take 3 Tabs by mouth 2 Times a Day. Collagen Advanced Formula     • ferrous sulfate 325 (65 Fe) MG tablet Take 1 Tab by mouth every morning with breakfast. 30 Tab 1   • Misc. Devices Misc Adult briefs XXL (FEMALE) FOR URINARY INCONTINENCE- ANY BRAND PER PT PREFERENCE OR INSURANC ALLOWANCE; TO CHANGE  Each 5   • ascorbic acid (VITAMIN C) 500 MG tablet Take 1 Tab by mouth every morning with breakfast. 30 Tab 3   • cyanocobalamin (VITAMIN B12) 1000 MCG Tab Take 1 Tab by mouth every day. 90 Tab 4   • phentermine 37.5 MG capsule Take 1 Cap by mouth every morning for 90 days. 90 Cap 0   • gabapentin (NEURONTIN) 600 MG tablet Take 2 Tabs by mouth 2 times a day. 120 Tab 5     No facility-administered medications prior to visit.      No Known Allergies              Assessment and Plan:   The following treatment plan was discussed:     1. Obesity, morbid, BMI 40.0-49.9 (CMS-[facility])- s/p gastric bypass-doing much better with 10 pound weight reduction.  Continue phentermine recheck in 3 months for  refills.  - phentermine 37.5 MG capsule; Take 1 Cap by mouth every morning for 90 days.  Dispense: 90 Cap; Refill: 0    2. Essential hypertension  Good control.  Continue same regimen.  Diet exercise low-salt diet and avoid alcohol  3. Chronic bilateral low back pain with bilateral sciatica- pain mgt;    spine nv  Good control.  Continue pain management follow-up.      Follow-up: No follow-ups on file.

## 2020-12-07 ENCOUNTER — TELEMEDICINE (OUTPATIENT)
Dept: MEDICAL GROUP | Age: 55
End: 2020-12-07
Payer: COMMERCIAL

## 2020-12-07 VITALS — HEIGHT: 63 IN | WEIGHT: 217 LBS | BODY MASS INDEX: 38.45 KG/M2

## 2020-12-07 DIAGNOSIS — E66.9 OBESITY (BMI 30-39.9): Primary | ICD-10-CM

## 2020-12-07 DIAGNOSIS — E87.6 HYPOKALEMIA: ICD-10-CM

## 2020-12-07 DIAGNOSIS — Z12.31 ENCOUNTER FOR SCREENING MAMMOGRAM FOR BREAST CANCER: ICD-10-CM

## 2020-12-07 DIAGNOSIS — J45.20 MILD INTERMITTENT ASTHMA WITHOUT COMPLICATION: ICD-10-CM

## 2020-12-07 DIAGNOSIS — I87.2 VENOUS INSUFFICIENCY OF BOTH LOWER EXTREMITIES: ICD-10-CM

## 2020-12-07 PROBLEM — Z12.11 COLON CANCER SCREENING: Status: RESOLVED | Noted: 2017-08-09 | Resolved: 2020-12-07

## 2020-12-07 PROBLEM — Z00.8 ENCOUNTER FOR WORK CAPABILITY ASSESSMENT: Status: RESOLVED | Noted: 2018-08-29 | Resolved: 2020-12-07

## 2020-12-07 PROCEDURE — 99214 OFFICE O/P EST MOD 30 MIN: CPT | Mod: 95,CR | Performed by: FAMILY MEDICINE

## 2020-12-07 RX ORDER — POTASSIUM CHLORIDE 20 MEQ/1
20 TABLET, EXTENDED RELEASE ORAL DAILY
Qty: 90 TAB | Refills: 0 | Status: SHIPPED | OUTPATIENT
Start: 2020-12-07 | End: 2021-02-17 | Stop reason: SDUPTHER

## 2020-12-07 RX ORDER — ALBUTEROL SULFATE 90 UG/1
1 POWDER, METERED RESPIRATORY (INHALATION) EVERY 6 HOURS PRN
Qty: 1 EACH | Refills: 5 | Status: SHIPPED | OUTPATIENT
Start: 2020-12-07 | End: 2021-02-17 | Stop reason: SDUPTHER

## 2020-12-07 RX ORDER — PHENTERMINE HYDROCHLORIDE 37.5 MG/1
37.5 CAPSULE ORAL EVERY MORNING
Qty: 60 CAP | Refills: 0 | Status: SHIPPED | OUTPATIENT
Start: 2020-12-07 | End: 2021-02-10 | Stop reason: SDUPTHER

## 2020-12-07 RX ORDER — PHENTERMINE HYDROCHLORIDE 37.5 MG/1
37.5 CAPSULE ORAL EVERY MORNING
Qty: 60 CAP | Refills: 0 | Status: SHIPPED | OUTPATIENT
Start: 2020-12-07 | End: 2020-12-07 | Stop reason: SDUPTHER

## 2020-12-07 ASSESSMENT — FIBROSIS 4 INDEX: FIB4 SCORE: 0.64

## 2020-12-07 NOTE — PROGRESS NOTES
Virtual Visit: Established Patient   This visit was conducted via Zoom using secure and encrypted videoconferencing technology. The patient was in a private location in the state of [place].    The patient's identity was confirmed and verbal consent was obtained for this virtual visit.    Subjective:   CC: weight loss     This patient is new to me.     Karine Ovalle is a 55 y.o. female with hx of morbid obesity.  Patient has been taking phentermine 37.5 milligrams daily for weight loss managed by PCP.  Unfortunately, patient is not able to follow-up with PCP today as he is out of the office.  Patient tolerated medication well, no side effect reported.  Negative history of drugs, alcohol, tobacco abuse.  Patient does have chronic pain syndrome and is on chronic morphine and Percocet managed by pain management.  Patient lost approximately 33 pounds since August 2020.  Patient is pleased with the outcome of the treatment.  She would like to continue to take phentermine for weight loss at this time.    Patient also has history of mild intermittent asthma.  She is taking albuterol as needed.  She tolerated medication well, no side effect reported.  No acute respiratory symptoms today.  Patient declined influenza vaccine.  She states that she gets really sick with influenza vaccine.    Patient has chronic pedal edema from venous insufficiency.  She was advised to take Lasix 40 mg with potassium supplement as needed for her symptoms.  Patient states that the medication is helping with her symptoms.     ROS   See HPI  Constitutional: Negative for fever, chills and malaise/fatigue.   HENT: Negative for congestion.    Eyes: Negative for pain.   Respiratory: Negative for cough and shortness of breath.    Cardiovascular: Negative for leg swelling.   Gastrointestinal: Negative for nausea, vomiting, abdominal pain and diarrhea.   Genitourinary: Negative for dysuria and hematuria.   Skin: Negative for rash.   Neurological:  Negative for dizziness, focal weakness and headaches.   Endo/Heme/Allergies: Does not bruise/bleed easily.   Psychiatric/Behavioral: Negative for depression.  The patient is not nervous/anxious.     No Known Allergies    Current medicines (including changes today)  Current Outpatient Medications   Medication Sig Dispense Refill   • Albuterol Sulfate (PROAIR RESPICLICK) 108 (90 Base) MCG/ACT AEROSOL POWDER, BREATH ACTIVATED Inhale 1 Puff every 6 hours as needed (Wheezing). 1 Each 5   • potassium chloride SA (KDUR) 20 MEQ Tab CR Take 1 Tab by mouth every day. 90 Tab 0   • phentermine 37.5 MG capsule Take 1 Cap by mouth every morning for 60 days. 60 Cap 0   • gabapentin (NEURONTIN) 600 MG tablet Take 1 Tab by mouth 3 times a day for 90 days. 270 Tab 3   • methocarbamol (ROBAXIN) 500 MG Tab Take 2 Tabs by mouth 4 times a day. 236 Tab 4   • morphine ER (MS CONTIN) 15 MG Tab CR tablet      • oxyCODONE-acetaminophen (PERCOCET-10)  MG Tab Take 1 Tab by mouth.  0   • furosemide (LASIX) 40 MG Tab Take 1 Tab by mouth every day. 90 Tab 4   • Calcium Carbonate-Vitamin D (CALCIUM 600 + D) 600-400 MG-UNIT Tab Take 1 Tab by mouth 3 times a day.     • Omega-3 Fatty Acids (FISH OIL) 1000 MG Cap capsule Take 1,000 mg by mouth every day.     • Multiple Vitamins-Minerals (ONE-A-DAY WOMENS 50 PLUS PO) Take 1 tablet by mouth every day.     • non-formulary med Take 3 Tabs by mouth 2 Times a Day. Collagen Advanced Formula     • ferrous sulfate 325 (65 Fe) MG tablet Take 1 Tab by mouth every morning with breakfast. 30 Tab 1   • ascorbic acid (VITAMIN C) 500 MG tablet Take 1 Tab by mouth every morning with breakfast. 30 Tab 3   • cyanocobalamin (VITAMIN B12) 1000 MCG Tab Take 1 Tab by mouth every day. 90 Tab 4   • Misc. Devices Misc Adult briefs XXL (FEMALE) FOR URINARY INCONTINENCE- ANY BRAND PER PT PREFERENCE OR INSURANC ALLOWANCE; TO CHANGE QID (Patient not taking: Reported on 12/7/2020) 120 Each 5     No current  facility-administered medications for this visit.        Patient Active Problem List    Diagnosis Date Noted   • Mild intermittent asthma without complication 04/25/2012     Priority: High   • Primary osteoarthritis of both knees- sp right TKR 2015; left TKR tbd 2020- dr nowak 06/04/2019   • Administrative encounter- RTC ACCESS/ADA PARATRANSIT ELIGIBILITY 06/04/2019   • S/P hip replacement, bilateral 02/13/2019   • Primary insomnia 01/03/2019   • Mixed stress and urge urinary incontinence 01/03/2019   • Edema 12/13/2018   • Vitamin D deficiency 12/13/2018   • Microcytic anemia- postop THR 2/2019 12/13/2018   • Cervical myelopathy (HCC) 12/11/2018   • Left hip pain 09/18/2018   • DDD (degenerative disc disease), cervical- MOD-SEVERE SPINE NV- dr brennan; fusion and laminectomy C3-T1 12/5/2018 dr brennan 08/09/2018   • Primary osteoarthritis of both hips- dr nowak; left THR done feb 6, 2019; right THR 3/2018 06/07/2018   • Obesity (BMI 30-39.9) 06/07/2018   • DDD (degenerative disc disease), lumbar 04/25/2018   • Uncomplicated opioid dependence (CMS-HCC)- spine nv 01/31/2018   • Essential hypertension 01/31/2018   • Venous insufficiency 10/03/2017   • Chronic bilateral low back pain with bilateral sciatica- pain mgt;    spine nv 07/26/2017   • Vitamin B 12 deficiency 06/02/2016   • History of gastric bypass 04/25/2012       Family History   Problem Relation Age of Onset   • Diabetes Mother    • Heart Disease Mother        She  has a past medical history of Anemia, Arthritis, At risk for falls, Chronic back pain, Dental disorder, Pain (12/04/2018), Pain (02/04/2019), and Urinary incontinence. She also has no past medical history of ASTHMA, Breast cancer (HCC), or Diabetes.  She  has a past surgical history that includes gastric bypass laparoscopic (2002); abdominal exploration; plastic surgery (2004); debridement (5/17/2012); appendectomy laparoscopic (3/21/2013); knee arthroplasty total (Right, 10/20/2015); mammoplasty  "augmentation (Bilateral, 2004); hip arthroplasty total (Right, 3/20/2018); cervical disk and fusion anterior (12/5/2018); corpectomy (12/5/2018); cervical fusion posterior (12/7/2018); cervical laminectomy posterior (12/7/2018); and hip arthroplasty total (Left, 2/13/2019).       Objective:   Ht 1.6 m (5' 3\")   Wt 98.4 kg (217 lb) Comment: pt stated  LMP  (LMP Unknown)   BMI 38.44 kg/m²     Physical Exam:  Constitutional: Alert, no distress, well-groomed.  Skin: No rashes in visible areas.  Eye: Round. Conjunctiva clear, lids normal. No icterus.   ENMT: Lips pink without lesions, good dentition, moist mucous membranes. Phonation normal.  Neck: No masses, no thyromegaly. Moves freely without pain.  Respiratory: Unlabored respiratory effort, no cough or audible wheeze  Psych: Alert and oriented x3, normal affect and mood.       Assessment and Plan:   The following treatment plan was discussed:     1. Encounter for screening mammogram for breast cancer  - MA-SCREENING MAMMO BILAT W/CAD; Future    2. Obesity (BMI 30-39.9)  - Patient identified as having weight management issue.  Appropriate orders and counseling given.  - phentermine 37.5 MG capsule; Take 1 Cap by mouth every morning for 60 days.  Dispense: 60 Cap; Refill: 0  - Risks, benefits, side effects, as well as potential health complications associated with Phentermine discussed with patient. Appropriate counseling provided.    - regular exercises, dietary modification  - f/u with PCP for med refill     3. Venous insufficiency of both lower extremities  4. Hypokalemia  Chronic, controlled with Lasix 40 mg qd & K+ supplement, no s/e reported, will continue.    - potassium chloride SA (KDUR) 20 MEQ Tab CR; Take 1 Tab by mouth every day.  Dispense: 90 Tab; Refill: 0  - cont Lasix 40 mg qd prn for pedal edema  - graduated compression stockings  - legs elevation at rest  - low salt diet  - weight loss, regular exercise as tolerated  - avoid prolonged standing or " walking    5. Mild intermittent asthma without complication  - Albuterol Sulfate (PROAIR RESPICLICK) 108 (90 Base) MCG/ACT AEROSOL POWDER, BREATH ACTIVATED; Inhale 1 Puff every 6 hours as needed (Wheezing).  Dispense: 1 Each; Refill: 5       Follow-up: Return for follow up with PCP.

## 2020-12-08 ENCOUNTER — TELEPHONE (OUTPATIENT)
Dept: MEDICAL GROUP | Age: 55
End: 2020-12-08

## 2020-12-08 NOTE — TELEPHONE ENCOUNTER
DOCUMENTATION OF PAR STATUS:    1. Name of Medication & Dose:phentermine 37.5 MG capsule    2. Name of Prescription Coverage Company & phone #: [employer]    3. Date Prior Auth Submitted: 12/8/2020    4. What information was given to obtain insurance decision? icd-10    5. Prior Auth Status? Pending ([id number])    6. Patient Notified: no

## 2020-12-10 NOTE — LETTER
Medication(s) Requested: Vyvanse 20 mg  Last office visit: 11/11/20  Last refill: 11/10/20  Last dispensed: 11/11/20  Is the patient due for refill of this medication(s): Yes  PDMP review: Criteria met. Forwarded to Physician/ADELE for signature.      September 25, 2018         Patient: Karine Ovalle   YOB: 1965   Date of Visit: 9/25/2018           To Whom it May Concern:    Karine Ovalle was seen in my clinic on 9/25/2018. Please excuse her from work today.        Sincerely,           ANNA Zacarias.  Electronically Signed

## 2020-12-28 DIAGNOSIS — M50.30 DDD (DEGENERATIVE DISC DISEASE), CERVICAL: ICD-10-CM

## 2020-12-28 DIAGNOSIS — M51.36 DDD (DEGENERATIVE DISC DISEASE), LUMBAR: ICD-10-CM

## 2020-12-28 RX ORDER — METHOCARBAMOL 500 MG/1
1000 TABLET, FILM COATED ORAL 4 TIMES DAILY
Qty: 120 TAB | Refills: 0 | Status: SHIPPED | OUTPATIENT
Start: 2020-12-28 | End: 2021-03-14 | Stop reason: SDUPTHER

## 2021-01-25 ENCOUNTER — PATIENT MESSAGE (OUTPATIENT)
Dept: MEDICAL GROUP | Age: 56
End: 2021-01-25

## 2021-01-25 DIAGNOSIS — M51.36 DDD (DEGENERATIVE DISC DISEASE), LUMBAR: ICD-10-CM

## 2021-01-25 DIAGNOSIS — E66.9 OBESITY (BMI 30-39.9): ICD-10-CM

## 2021-01-25 DIAGNOSIS — M50.30 DDD (DEGENERATIVE DISC DISEASE), CERVICAL: ICD-10-CM

## 2021-01-25 RX ORDER — METHOCARBAMOL 500 MG/1
1000 TABLET, FILM COATED ORAL 4 TIMES DAILY
Qty: 236 TAB | Refills: 0 | Status: SHIPPED | OUTPATIENT
Start: 2021-01-25 | End: 2021-06-23 | Stop reason: SDUPTHER

## 2021-01-26 RX ORDER — PHENTERMINE HYDROCHLORIDE 37.5 MG/1
37.5 CAPSULE ORAL EVERY MORNING
Qty: 30 CAP | Refills: 1 | OUTPATIENT
Start: 2021-01-26 | End: 2021-03-27

## 2021-02-10 DIAGNOSIS — E66.9 OBESITY (BMI 30-39.9): ICD-10-CM

## 2021-02-10 RX ORDER — PHENTERMINE HYDROCHLORIDE 37.5 MG/1
37.5 CAPSULE ORAL EVERY MORNING
Qty: 90 CAPSULE | Refills: 1 | Status: SHIPPED | OUTPATIENT
Start: 2021-02-10 | End: 2021-05-11

## 2021-02-17 ENCOUNTER — OFFICE VISIT (OUTPATIENT)
Dept: MEDICAL GROUP | Age: 56
End: 2021-02-17
Payer: COMMERCIAL

## 2021-02-17 VITALS
BODY MASS INDEX: 42.17 KG/M2 | HEIGHT: 63 IN | SYSTOLIC BLOOD PRESSURE: 136 MMHG | HEART RATE: 91 BPM | TEMPERATURE: 99.1 F | DIASTOLIC BLOOD PRESSURE: 90 MMHG | WEIGHT: 238 LBS

## 2021-02-17 DIAGNOSIS — J45.20 MILD INTERMITTENT ASTHMA WITHOUT COMPLICATION: ICD-10-CM

## 2021-02-17 DIAGNOSIS — R73.01 IFG (IMPAIRED FASTING GLUCOSE): ICD-10-CM

## 2021-02-17 DIAGNOSIS — E55.9 VITAMIN D DEFICIENCY: ICD-10-CM

## 2021-02-17 DIAGNOSIS — D50.9 MICROCYTIC ANEMIA: ICD-10-CM

## 2021-02-17 DIAGNOSIS — M25.551 CHRONIC RIGHT HIP PAIN: ICD-10-CM

## 2021-02-17 DIAGNOSIS — E66.01 OBESITY, MORBID, BMI 40.0-49.9 (HCC): ICD-10-CM

## 2021-02-17 DIAGNOSIS — Z12.12 SCREENING FOR COLORECTAL CANCER: ICD-10-CM

## 2021-02-17 DIAGNOSIS — M54.42 CHRONIC BILATERAL LOW BACK PAIN WITH BILATERAL SCIATICA: ICD-10-CM

## 2021-02-17 DIAGNOSIS — R19.7 ACUTE DIARRHEA: ICD-10-CM

## 2021-02-17 DIAGNOSIS — M54.41 CHRONIC BILATERAL LOW BACK PAIN WITH BILATERAL SCIATICA: ICD-10-CM

## 2021-02-17 DIAGNOSIS — G89.29 CHRONIC RIGHT HIP PAIN: ICD-10-CM

## 2021-02-17 DIAGNOSIS — I89.0 LYMPHEDEMA: ICD-10-CM

## 2021-02-17 DIAGNOSIS — G89.29 CHRONIC BILATERAL LOW BACK PAIN WITH BILATERAL SCIATICA: ICD-10-CM

## 2021-02-17 DIAGNOSIS — Z12.31 ENCOUNTER FOR SCREENING MAMMOGRAM FOR MALIGNANT NEOPLASM OF BREAST: ICD-10-CM

## 2021-02-17 DIAGNOSIS — E78.2 MIXED HYPERLIPIDEMIA: ICD-10-CM

## 2021-02-17 DIAGNOSIS — Z00.00 HEALTHCARE MAINTENANCE: ICD-10-CM

## 2021-02-17 DIAGNOSIS — E87.6 HYPOKALEMIA: ICD-10-CM

## 2021-02-17 DIAGNOSIS — Z12.11 SCREENING FOR COLORECTAL CANCER: ICD-10-CM

## 2021-02-17 DIAGNOSIS — E53.8 VITAMIN B 12 DEFICIENCY: ICD-10-CM

## 2021-02-17 PROCEDURE — 99214 OFFICE O/P EST MOD 30 MIN: CPT | Performed by: INTERNAL MEDICINE

## 2021-02-17 RX ORDER — POTASSIUM CHLORIDE 20 MEQ/1
20 TABLET, EXTENDED RELEASE ORAL DAILY
Qty: 90 TABLET | Refills: 0 | Status: SHIPPED | OUTPATIENT
Start: 2021-02-17 | End: 2021-06-01

## 2021-02-17 RX ORDER — ALBUTEROL SULFATE 90 UG/1
1 POWDER, METERED RESPIRATORY (INHALATION) EVERY 6 HOURS PRN
Qty: 1 EACH | Refills: 5 | Status: SHIPPED | OUTPATIENT
Start: 2021-02-17 | End: 2021-04-07 | Stop reason: SDUPTHER

## 2021-02-17 RX ORDER — GABAPENTIN 600 MG/1
600 TABLET ORAL 4 TIMES DAILY
Qty: 360 TABLET | Refills: 4 | Status: SHIPPED | OUTPATIENT
Start: 2021-02-17 | End: 2021-12-23

## 2021-02-17 RX ORDER — DIAZEPAM 5 MG/1
TABLET ORAL
COMMUNITY
Start: 2020-12-13 | End: 2021-06-17

## 2021-02-17 ASSESSMENT — PATIENT HEALTH QUESTIONNAIRE - PHQ9: CLINICAL INTERPRETATION OF PHQ2 SCORE: 0

## 2021-02-18 ASSESSMENT — ENCOUNTER SYMPTOMS
CARDIOVASCULAR NEGATIVE: 1
RESPIRATORY NEGATIVE: 1
MUSCULOSKELETAL NEGATIVE: 1
NEUROLOGICAL NEGATIVE: 1
GASTROINTESTINAL NEGATIVE: 1
PSYCHIATRIC NEGATIVE: 1
CONSTITUTIONAL NEGATIVE: 1
EYES NEGATIVE: 1

## 2021-02-19 NOTE — PROGRESS NOTES
Subjective:      Karine Ovalle is a 55 y.o. female who presents with Hip Pain (right x5 mo )  The patient is here for followup of chronic medical problems listed below. The patient is compliant with medications and having no side effects from them. Denies chest pain, abdominal pain, dyspnea, myalgias, or cough.   Patient Active Problem List    Diagnosis Date Noted   • Mild intermittent asthma without complication 04/25/2012     Priority: High   • Primary osteoarthritis of both knees- sp right TKR 2015; left TKR tbd 2020- dr nowak 06/04/2019   • Administrative encounter- RTC ACCESS/ADA PARATRANSIT ELIGIBILITY 06/04/2019   • S/P hip replacement, bilateral 02/13/2019   • Primary insomnia 01/03/2019   • Mixed stress and urge urinary incontinence 01/03/2019   • Edema 12/13/2018   • Vitamin D deficiency 12/13/2018   • Microcytic anemia- postop THR 2/2019 12/13/2018   • Cervical myelopathy (HCC) 12/11/2018   • Left hip pain 09/18/2018   • DDD (degenerative disc disease), cervical- MOD-SEVERE SPINE NV- dr brennan; fusion and laminectomy C3-T1 12/5/2018 dr brennan 08/09/2018   • Primary osteoarthritis of both hips- dr nowak; left THR done feb 6, 2019; right THR 3/2018 06/07/2018   • Obesity (BMI 30-39.9) 06/07/2018   • DDD (degenerative disc disease), lumbar 04/25/2018   • Uncomplicated opioid dependence (CMS-HCC)- spine nv 01/31/2018   • Essential hypertension 01/31/2018   • Venous insufficiency 10/03/2017   • Chronic bilateral low back pain with bilateral sciatica- pain mgt;    spine nv 07/26/2017   • Vitamin B 12 deficiency 06/02/2016   • History of gastric bypass 04/25/2012     No Known Allergies  Outpatient Medications Prior to Visit   Medication Sig Dispense Refill   • phentermine 37.5 MG capsule Take 1 capsule by mouth every morning for 90 days. 90 capsule 1   • methocarbamol (ROBAXIN) 500 MG Tab Take 2 Tabs by mouth 4 times a day. 236 Tab 0   • methocarbamol (ROBAXIN) 500 MG Tab Take 2 Tabs by mouth 4 times a  day. 120 Tab 0   • morphine ER (MS CONTIN) 15 MG Tab CR tablet      • oxyCODONE-acetaminophen (PERCOCET-10)  MG Tab Take 1 Tab by mouth.  0   • furosemide (LASIX) 40 MG Tab Take 1 Tab by mouth every day. 90 Tab 4   • Calcium Carbonate-Vitamin D (CALCIUM 600 + D) 600-400 MG-UNIT Tab Take 1 Tab by mouth 3 times a day.     • Omega-3 Fatty Acids (FISH OIL) 1000 MG Cap capsule Take 1,000 mg by mouth every day.     • Multiple Vitamins-Minerals (ONE-A-DAY WOMENS 50 PLUS PO) Take 1 tablet by mouth every day.     • non-formulary med Take 3 Tabs by mouth 2 Times a Day. Collagen Advanced Formula     • ferrous sulfate 325 (65 Fe) MG tablet Take 1 Tab by mouth every morning with breakfast. 30 Tab 1   • ascorbic acid (VITAMIN C) 500 MG tablet Take 1 Tab by mouth every morning with breakfast. 30 Tab 3   • cyanocobalamin (VITAMIN B12) 1000 MCG Tab Take 1 Tab by mouth every day. 90 Tab 4   • diazePAM (VALIUM) 5 MG Tab TAKE 1 TABLET BY MOUTH ONCE 90 MINUTES PRIOR TO PROCEDURE MAY TAKE AN ADDITONAL TABLET 45 MINUTES PRIOR TO PROCEDURE IF STILL FEELING ANXIOU     • Albuterol Sulfate (PROAIR RESPICLICK) 108 (90 Base) MCG/ACT AEROSOL POWDER, BREATH ACTIVATED Inhale 1 Puff every 6 hours as needed (Wheezing). 1 Each 5   • potassium chloride SA (KDUR) 20 MEQ Tab CR Take 1 Tab by mouth every day. 90 Tab 0   • Misc. Devices Misc Adult briefs XXL (FEMALE) FOR URINARY INCONTINENCE- ANY BRAND PER PT PREFERENCE OR INSURANC ALLOWANCE; TO CHANGE QID (Patient not taking: Reported on 12/7/2020) 120 Each 5     No facility-administered medications prior to visit.               HPI    Review of Systems   Constitutional: Negative.    HENT: Negative.    Eyes: Negative.    Respiratory: Negative.    Cardiovascular: Negative.    Gastrointestinal: Negative.    Genitourinary: Negative.    Musculoskeletal: Negative.    Skin: Negative.    Neurological: Negative.    Endo/Heme/Allergies: Negative.    Psychiatric/Behavioral: Negative.        No visits with  results within 1 Month(s) from this visit.   Latest known visit with results is:   Hospital Outpatient Visit on 05/21/2020   Component Date Value   • Significant Indicator 05/21/2020 POS*   • Source 05/21/2020 WND    • Site 05/21/2020 Right Leg    • Culture Result 05/21/2020 -*   • Gram Stain Result 05/21/2020 No organisms seen.    • Culture Result 05/21/2020 *                    Value:Pseudomonas aeruginosa  Moderate growth  P.aeruginosa may develop resistance during prolonged therapy  with all antibiotics. Isolates that are initially susceptible  may become resistant within three to four days after  initiation of therapy. Testing of repeat isolates may be  warranted.     • Significant Indicator 05/21/2020 .    • Source 05/21/2020 WND    • Site 05/21/2020 Right Leg    • Gram Stain Result 05/21/2020 No organisms seen.     '  Lab Results   Component Value Date/Time    HBA1C 5.0 02/16/2019 05:52 AM    HBA1C 5.9 (H) 12/12/2018 06:00 AM     Lab Results   Component Value Date/Time    SODIUM 139 02/16/2019 05:52 AM    POTASSIUM 3.6 02/16/2019 05:52 AM    CHLORIDE 108 02/16/2019 05:52 AM    CO2 27 02/16/2019 05:52 AM    GLUCOSE 105 (H) 02/16/2019 05:52 AM    BUN 12 02/16/2019 05:52 AM    CREATININE 0.39 (L) 02/16/2019 05:52 AM    CREATININE 0.88 08/14/2010 12:00 AM    BUNCREATRAT 13 08/14/2010 12:00 AM    GLOMRATE >59 08/14/2010 12:00 AM    ALKPHOSPHAT 94 02/16/2019 05:52 AM    ASTSGOT 14 02/16/2019 05:52 AM    ALTSGPT 6 02/16/2019 05:52 AM    TBILIRUBIN 0.3 02/16/2019 05:52 AM     Lab Results   Component Value Date/Time    INR 1.00 02/13/2019 11:44 AM    INR 1.01 12/04/2018 11:15 AM    INR 1.04 05/18/2012 12:20 PM     Lab Results   Component Value Date/Time    CHOLSTRLTOT 168 02/22/2018 05:05 PM    LDL 84 02/22/2018 05:05 PM    HDL 67 02/22/2018 05:05 PM    TRIGLYCERIDE 85 02/22/2018 05:05 PM       No results found for: TESTOSTERONE  Lab Results   Component Value Date/Time    TSH 2.110 08/14/2010 12:00 AM     Lab Results  "  Component Value Date/Time    FREET4 0.72 05/03/2016 07:32 AM     Lab Results   Component Value Date/Time    URICACID 5.6 08/12/2014 04:49 PM     No components found for: VITB12  Lab Results   Component Value Date/Time    25HYDROXY 17 (L) 02/15/2019 03:30 AM    25HYDROXY 14 (L) 12/12/2018 06:00 AM        Objective:     /90 (BP Location: Left arm, Patient Position: Sitting, BP Cuff Size: Adult)   Pulse 91   Temp 37.3 °C (99.1 °F) (Temporal)   Ht 1.6 m (5' 3\")   Wt 108 kg (238 lb)   LMP  (LMP Unknown)   BMI 42.16 kg/m²      Physical Exam  Vitals reviewed.   Constitutional:       General: She is not in acute distress.     Appearance: She is well-developed. She is not diaphoretic.   HENT:      Head: Normocephalic and atraumatic.      Right Ear: External ear normal.      Left Ear: External ear normal.      Nose: Nose normal.      Mouth/Throat:      Pharynx: No oropharyngeal exudate.   Eyes:      General: No scleral icterus.        Right eye: No discharge.         Left eye: No discharge.      Conjunctiva/sclera: Conjunctivae normal.      Pupils: Pupils are equal, round, and reactive to light.   Neck:      Thyroid: No thyromegaly.      Vascular: No JVD.      Trachea: No tracheal deviation.   Cardiovascular:      Rate and Rhythm: Normal rate and regular rhythm.      Heart sounds: Normal heart sounds. No murmur. No friction rub. No gallop.    Pulmonary:      Effort: Pulmonary effort is normal. No respiratory distress.      Breath sounds: Normal breath sounds. No stridor. No wheezing or rales.   Chest:      Chest wall: No tenderness.   Abdominal:      General: Bowel sounds are normal. There is no distension.      Palpations: Abdomen is soft. There is no mass.      Tenderness: There is no abdominal tenderness. There is no guarding or rebound.   Musculoskeletal:         General: No tenderness. Normal range of motion.      Cervical back: Normal range of motion and neck supple.   Lymphadenopathy:      Cervical: No " cervical adenopathy.   Skin:     General: Skin is warm and dry.      Coloration: Skin is not pale.      Findings: No erythema or rash.   Neurological:      Mental Status: She is alert and oriented to person, place, and time.      Cranial Nerves: No cranial nerve deficit.      Motor: No abnormal muscle tone.      Coordination: Coordination normal.      Deep Tendon Reflexes: Reflexes are normal and symmetric. Reflexes normal.   Psychiatric:         Behavior: Behavior normal.         Thought Content: Thought content normal.         Judgment: Judgment normal.                 Assessment/Plan:        1. Healthcare maintenance     - REFERRAL TO GYNECOLOGY  - TSH; Future  - Comp Metabolic Panel; Future  - Lipid Profile; Future  - CBC WITH DIFFERENTIAL; Future    2. Chronic right hip pain      Under good control. Continue same regimen  - DX-HIP-COMPLETE - UNILATERAL 2+ RIGHT; Future    3. Screening for colorectal cancer     - REFERRAL TO GI FOR COLONOSCOPY      4. Acute diarrhea -New  This is a new problem, but probably functional or related diet.  Rule out intestinal infection    - Complete O&P; Future  - CULTURE STOOL; Future  - Cdiff By PCR Rflx Toxin; Future    5. Lymphedema      Under good control. Continue same regimen  - REFERRAL TO LYMPHEDEMA-PHYSICAL THERAPY  - REFERRAL TO VASCULAR MEDICINE    6. Encounter for screening mammogram for malignant neoplasm of breast      Under good control. Continue same regimen  - MA-SCREENING MAMMO BILAT W/CAD; Future    7. Obesity, morbid, BMI 40.0-49.9 (HCC)     diet/exercise/lose 15 lbs.; patient counseled    8. Vitamin B 12 deficiency   Under good control. Continue same regimen     - VITAMIN B12; Future    9. Vitamin D deficiency   Under good control. Continue same regimen     - VITAMIN D,25 HYDROXY; Future    10. IFG (impaired fasting glucose)       Under good control. Continue same regimen  - HEMOGLOBIN A1C; Future    11. Microcytic anemia   Under good control. Continue same  regimen     - CBC WITH DIFFERENTIAL; Future  - IRON/TOTAL IRON BIND; Future  - FERRITIN; Future  - VITAMIN B12; Future  - FOLATE; Future     Under good control. Continue same regimen  12. Chronic bilateral low back pain with bilateral sciatica          Under good control. Continue same regimen  - gabapentin (NEURONTIN) 600 MG tablet; Take 1 tablet by mouth 4 times a day.  Dispense: 360 tablet; Refill: 4    13. Mild intermittent asthma without complication       Under good control. Continue same regimen  - Albuterol Sulfate (PROAIR RESPICLICK) 108 (90 Base) MCG/ACT AEROSOL POWDER, BREATH ACTIVATED; Inhale 1 Puff every 6 hours as needed (Wheezing).  Dispense: 1 Each; Refill: 5    14. Mixed hyperlipidemia   Under good control. Continue same regimen     - TSH; Future  - Comp Metabolic Panel; Future  - Lipid Profile; Future  - CBC WITH DIFFERENTIAL; Future    15. Hypokalemia     Under good control. Continue same regimen  - potassium chloride SA (KDUR) 20 MEQ Tab CR; Take 1 tablet by mouth every day.  Dispense: 90 tablet; Refill: 0    16. Chronic bilateral low back pain with bilateral sciatica- pain mgt;    spine nv      Under good control. Continue same regimen- gabapentin (NEURONTIN) 600 MG tablet; Take 1 tablet by mouth 4 times a day.  Dispense: 360 tablet; Refill: 4

## 2021-02-23 ENCOUNTER — TELEPHONE (OUTPATIENT)
Dept: VASCULAR LAB | Facility: MEDICAL CENTER | Age: 56
End: 2021-02-23

## 2021-02-24 ENCOUNTER — APPOINTMENT (OUTPATIENT)
Dept: MEDICAL GROUP | Age: 56
End: 2021-02-24
Payer: COMMERCIAL

## 2021-03-10 NOTE — ASSESSMENT & PLAN NOTE
Resume as needed Tums  Continue with omeprazole for GI prophylaxis     Patient is not pregnant (male or female)

## 2021-03-14 DIAGNOSIS — M51.36 DDD (DEGENERATIVE DISC DISEASE), LUMBAR: ICD-10-CM

## 2021-03-14 DIAGNOSIS — M50.30 DDD (DEGENERATIVE DISC DISEASE), CERVICAL: ICD-10-CM

## 2021-03-14 RX ORDER — METHOCARBAMOL 500 MG/1
1000 TABLET, FILM COATED ORAL 4 TIMES DAILY
Qty: 120 TABLET | Refills: 0 | Status: SHIPPED | OUTPATIENT
Start: 2021-03-14 | End: 2021-06-17

## 2021-03-15 DIAGNOSIS — Z23 NEED FOR VACCINATION: ICD-10-CM

## 2021-04-07 ENCOUNTER — OFFICE VISIT (OUTPATIENT)
Dept: URGENT CARE | Facility: CLINIC | Age: 56
End: 2021-04-07
Payer: COMMERCIAL

## 2021-04-07 ENCOUNTER — TELEPHONE (OUTPATIENT)
Dept: MEDICAL GROUP | Age: 56
End: 2021-04-07

## 2021-04-07 VITALS
RESPIRATION RATE: 18 BRPM | BODY MASS INDEX: 41.18 KG/M2 | TEMPERATURE: 97.4 F | OXYGEN SATURATION: 99 % | WEIGHT: 232.4 LBS | DIASTOLIC BLOOD PRESSURE: 60 MMHG | SYSTOLIC BLOOD PRESSURE: 124 MMHG | HEART RATE: 114 BPM | HEIGHT: 63 IN

## 2021-04-07 DIAGNOSIS — J45.20 MILD INTERMITTENT ASTHMA WITHOUT COMPLICATION: ICD-10-CM

## 2021-04-07 PROCEDURE — 99214 OFFICE O/P EST MOD 30 MIN: CPT | Performed by: PHYSICIAN ASSISTANT

## 2021-04-07 RX ORDER — ALBUTEROL SULFATE 90 UG/1
1 POWDER, METERED RESPIRATORY (INHALATION) EVERY 6 HOURS PRN
Qty: 1 EACH | Refills: 5 | Status: SHIPPED | OUTPATIENT
Start: 2021-04-07 | End: 2022-06-01

## 2021-04-07 ASSESSMENT — ENCOUNTER SYMPTOMS
SHORTNESS OF BREATH: 1
COUGH: 1
SORE THROAT: 0
CHILLS: 0
SPUTUM PRODUCTION: 0
WHEEZING: 1
HEMOPTYSIS: 0
PALPITATIONS: 0
FEVER: 0

## 2021-04-07 NOTE — TELEPHONE ENCOUNTER
1. Caller Name: Karine Ovalle                          Call Back Number: [phone]      How would the patient prefer to be contacted with a response: Phone call OK to leave a detailed message    Patient went to Urgent Care and was unable to get a Nebulizer order from them due to insurance issue. Patient would like to know if Dr. Rubin is willing to order her a nebulizer. Please advise

## 2021-04-07 NOTE — PROGRESS NOTES
Subjective:   Karine Ovalle is a 55 y.o. female who presents for Abdominal Pain (exposure to cigarette smoke at work; inhaler is hurting stomach )      Asthma  She complains of cough, shortness of breath and wheezing. There is no hemoptysis or sputum production. This is a chronic problem. The current episode started in the past 7 days. The problem has been waxing and waning. Pertinent negatives include no chest pain, ear pain, fever, malaise/fatigue or sore throat. Her symptoms are aggravated by exposure to smoke. Her past medical history is significant for asthma.       Review of Systems   Constitutional: Negative for chills, fever and malaise/fatigue.   HENT: Negative for congestion, ear pain and sore throat.    Respiratory: Positive for cough, shortness of breath and wheezing. Negative for hemoptysis and sputum production.    Cardiovascular: Negative for chest pain and palpitations.   All other systems reviewed and are negative.      Medications:    • ascorbic acid  • Calcium Carbonate-Vitamin D Tabs  • cyanocobalamin Tabs  • diazePAM Tabs  • ferrous sulfate  • fish oil Caps  • furosemide Tabs  • gabapentin  • methocarbamol Tabs  • Misc. Devices Misc  • morphine ER Tbcr  • non-formulary med  • ONE-A-DAY WOMENS 50 PLUS PO  • oxyCODONE-acetaminophen Tabs  • phentermine  • potassium chloride SA Tbcr  • ProAir RespiClick Aepb    Allergies: Patient has no known allergies.    Problem List: Karine Ovalle has History of gastric bypass; Mild intermittent asthma without complication; Vitamin B 12 deficiency; Chronic bilateral low back pain with bilateral sciatica- pain mgt;    spine nv; Venous insufficiency; Uncomplicated opioid dependence (CMS-[facility])- spine nv; Essential hypertension; DDD (degenerative disc disease), lumbar; Primary osteoarthritis of both hips- dr nowak; left THR done feb 6, 2019; right THR 3/2018; Obesity (BMI 30-39.9); DDD (degenerative disc disease), cervical- MOD-SEVERE SPINE NV- dr brennan;  fusion and laminectomy C3-T1 12/5/2018 dr brennan; Left hip pain; Cervical myelopathy (HCC); Edema; Vitamin D deficiency; Microcytic anemia- postop THR 2/2019; Primary insomnia; Mixed stress and urge urinary incontinence; S/P hip replacement, bilateral; Primary osteoarthritis of both knees- sp right TKR 2015; left TKR tbd 2020- dr levine; and Administrative encounter- RTC ACCESS/ADA PARATRANSIT ELIGIBILITY on their problem list.    Surgical History:  Past Surgical History:   Procedure Laterality Date   • HIP ARTHROPLASTY TOTAL Left 2/13/2019    Procedure: HIP ARTHROPLASTY TOTAL, Cabling of intra-operative calcar fracture;  Surgeon: Bryon Levine M.D.;  Location: [facility];  Service: Orthopedics   • CERVICAL FUSION POSTERIOR  12/7/2018    Procedure: CERVICAL FUSION POSTERIOR- STAGE #2 C3-5 AND C3-T1;  Surgeon: Emre Brennan III, M.D.;  Location: [facility];  Service: Neurosurgery   • CERVICAL LAMINECTOMY POSTERIOR  12/7/2018    Procedure: CERVICAL LAMINECTOMY POSTERIOR C2-T1;  Surgeon: Emre Brennan III, M.D.;  Location: [facility];  Service: Neurosurgery   • CERVICAL DISK AND FUSION ANTERIOR  12/5/2018    Procedure: CERVICAL DISK AND FUSION ANTERIOR-STAGE #1 C4-5;  Surgeon: Emre Brennan III, M.D.;  Location: [facility];  Service: Neurosurgery   • CORPECTOMY  12/5/2018    Procedure: CORPECTOMY;  Surgeon: Emre Brennan III, M.D.;  Location: [facility];  Service: Neurosurgery   • HIP ARTHROPLASTY TOTAL Right 3/20/2018    Procedure: HIP ARTHROPLASTY TOTAL;  Surgeon: Bryon Levine M.D.;  Location: [facility];  Service: Orthopedics   • KNEE ARTHROPLASTY TOTAL Right 10/20/2015    Procedure: KNEE ARTHROPLASTY TOTAL;  Surgeon: Bryon Levine M.D.;  Location: [facility];  Service:    • APPENDECTOMY LAPAROSCOPIC  3/21/2013    Performed by Dali Queen M.D. at [facility]   • DEBRIDEMENT  5/17/2012    Performed by  "BRADLEY DYKES at SURGERY [facility] ORS   • PLASTIC SURGERY  2004    excess skin on arms and legs removed   • MAMMOPLASTY AUGMENTATION Bilateral 2004    breast implants and \"tummy tuck\"   • GASTRIC BYPASS LAPAROSCOPIC  2002    x 2   • ABDOMINAL EXPLORATION         Past Social Hx: Karine Ovalle  reports that she has never smoked. She has never used smokeless tobacco. She reports previous alcohol use. She reports that she does not use drugs.     Past Family Hx:  Karine Ovalle family history includes Diabetes in her mother; Heart Disease in her mother.     Problem list, medications, and allergies reviewed by myself today in Epic.     Objective:     Blood Pressure 124/60 (BP Location: Left arm, Patient Position: Sitting)   Pulse (Abnormal) 114   Temperature 36.3 °C (97.4 °F) (Temporal)   Respiration 18   Height 1.6 m (5' 3\")   Weight 105 kg (232 lb 6.4 oz)   Last Menstrual Period  (LMP Unknown)   Oxygen Saturation 99%   Body Mass Index 41.17 kg/m²     Physical Exam  Vitals reviewed.   Constitutional:       General: She is not in acute distress.     Appearance: She is well-developed. She is not ill-appearing or toxic-appearing.      Interventions: She is not intubated.  HENT:      Head: Normocephalic and atraumatic.      Right Ear: Hearing, tympanic membrane, ear canal and external ear normal.      Left Ear: Hearing, tympanic membrane, ear canal and external ear normal.      Nose: Nose normal.      Mouth/Throat:      Pharynx: Uvula midline.   Eyes:      General: Lids are normal.      Conjunctiva/sclera: Conjunctivae normal.   Cardiovascular:      Rate and Rhythm: Regular rhythm. Tachycardia present.      Heart sounds: Normal heart sounds, S1 normal and S2 normal. No murmur. No friction rub. No gallop.    Pulmonary:      Effort: Pulmonary effort is normal. No tachypnea, bradypnea, accessory muscle usage or respiratory distress. She is not intubated.      Breath sounds: Normal breath sounds. No " decreased breath sounds, wheezing, rhonchi or rales.   Chest:      Chest wall: No tenderness.   Musculoskeletal:         General: Normal range of motion.      Cervical back: Full passive range of motion without pain, normal range of motion and neck supple.   Skin:     General: Skin is warm and dry.   Neurological:      Mental Status: She is alert and oriented to person, place, and time.   Psychiatric:         Speech: Speech normal.         Behavior: Behavior normal.         Thought Content: Thought content normal.         Judgment: Judgment normal.         Assessment/Plan:     Medical Decision Making/Comments     Patient is a 55 yr old female who presents for evaluation of shortness of breath.  Pt states she has asthma and cigarette smoke triggers exacerbations.  Her current inhaler of epinephrine does not help.  Pt denies red flag symptoms fever, chest pain, orthopnea, pleuritic pain, edema, SOB w/ exertion.  No PMH of anaphylaxis, diabetes, cardiac, lung, or neuromuscular disease.  Vital signs are within normal limits.  Pt does not appear in respiratory distress and is speaking in full sentences. There is no tripoding, stridor, work/noise of breathing.  Upper airway is open and clear with no JVD.  RRR without murmurs with auscultation of heart.  Clear lung sounds.  No signs of cyanosis, strong capillary refill and extremity pulses.  No pitting edema.  EKG shows normal sinus rhythm.  Chest X-Ray is normal.  Wells Criteria for PE: low risk 0 points.  PERC NEG.  Pt does not meet SIRS criteria (limited).  Based on history, exam and limited diagnostics pt is unlikely having an emergent event.    Diagnosis differential includes but not limited to: Bronchospasm, Asthma/COPD/CHF exacerbation, Pneumonia.    PE, Angioedema, Epiglottitis, Anaphylaxis, Pneumothorax, Interstitial lung disease, Cardiac arrhythmia, ACS, Pericardial effusion, Anemia, Sepsis, DKA, Neuromuscular weakness, Toxicology etiology.         Diagnosis and  associated orders     1. Mild intermittent asthma without complication  Albuterol Sulfate (PROAIR RESPICLICK) 108 (90 Base) MCG/ACT AEROSOL POWDER, BREATH ACTIVATED            Billing note: at least 30 minutes was allotted and spent for face-to-face care with the patient as well as coordination of care such as preparing for the visit, obtaining/reviewing history, reconciling outside information, performing an exam/evaluation, reviewing unique external notes and test reuslts, ordering/interpreting diagnostics, ordering/administering treatments, re-evaluating the patient, collaborating/communicating with other healthcare professionals, developing a plan of care, counseling/educating the patient/caregivers/family members, updating the medical record, and ensuring accurate documentation.     Differential diagnosis, natural history, supportive care, and indications for immediate follow-up discussed.    Advised the patient to follow-up with the primary care physician for recheck, reevaluation, and consideration of further management.    Please note that this dictation was created using voice recognition software. I have made a reasonable attempt to correct obvious errors, but I expect that there are errors of grammar and possibly content that I did not discover before finalizing the note.

## 2021-04-13 ENCOUNTER — TELEMEDICINE (OUTPATIENT)
Dept: MEDICAL GROUP | Age: 56
End: 2021-04-13
Payer: COMMERCIAL

## 2021-04-13 VITALS — WEIGHT: 214 LBS | BODY MASS INDEX: 37.92 KG/M2 | HEIGHT: 63 IN

## 2021-04-13 DIAGNOSIS — J45.20 MILD INTERMITTENT ASTHMA WITHOUT COMPLICATION: ICD-10-CM

## 2021-04-13 DIAGNOSIS — J45.21 MILD INTERMITTENT ASTHMA WITH ACUTE EXACERBATION: ICD-10-CM

## 2021-04-13 PROCEDURE — 99214 OFFICE O/P EST MOD 30 MIN: CPT | Mod: 95,CR | Performed by: INTERNAL MEDICINE

## 2021-04-13 RX ORDER — FLUTICASONE PROPIONATE 44 MCG
2 AEROSOL WITH ADAPTER (GRAM) INHALATION 2 TIMES DAILY
Qty: 1 EACH | Refills: 11 | Status: SHIPPED | OUTPATIENT
Start: 2021-04-13 | End: 2021-12-31

## 2021-04-13 RX ORDER — IPRATROPIUM BROMIDE AND ALBUTEROL SULFATE 2.5; .5 MG/3ML; MG/3ML
3 SOLUTION RESPIRATORY (INHALATION) EVERY 6 HOURS PRN
Qty: 120 EACH | Refills: 11 | Status: SHIPPED | OUTPATIENT
Start: 2021-04-13 | End: 2022-06-01

## 2021-04-13 NOTE — PROGRESS NOTES
Virtual Visit: Established Patient   This visit was conducted via Zoom using secure and encrypted videoconferencing technology. The patient was in a private location in the state of [place].    The patient's identity was confirmed and verbal consent was obtained for this virtual visit.    Subjective:   CC:   Chief Complaint   Patient presents with   • Orders Needed     nebulizer        Karine Ovalle is a 55 y.o. female presenting for evaluation and management of:  Acute exacerbation of mild intermittent asthma.  She is having asthma attacks every 2 to 4 weeks and last for 3 to 4 days up to 2 weeks at a time.  Sometimes she can go 2 or 3 months without an attack.  Because of this she will need a maintenance inhaler and this was discussed at length with the patient.  She will use Flovent or what ever is preferred by insurance company at the least expensive cost.  NPPB machine will be prescribed today to be provided by her DME company of choice, which she believes is [employer].  Order will be faxed to them.  DuoNeb solution will be prescribed and sent to her pharmacy [facility].       ROS    Denies any recent fevers or chills. No nausea or vomiting. No chest pains or shortness of breath.     No Known Allergies    Current medicines (including changes today)  Current Outpatient Medications   Medication Sig Dispense Refill   • fluticasone (FLOVENT HFA) 44 MCG/ACT Aerosol Inhale 2 Puffs 2 times a day. Everyday maintenance steroid inhaler 1 Each 11   • ipratropium-albuterol (DUONEB) 0.5-2.5 (3) MG/3ML nebulizer solution Take 3 mL by nebulization every 6 hours as needed for Shortness of Breath. 120 Each 11   • Misc. Devices Misc NPPB machine for mild intermittent asthma with acute exacerbation to be used every 4 hours as needed acute wheezing with DuoNeb solution as provided by pharmacy, and machine provided by [employer] 1 Each 1   • Albuterol Sulfate (PROAIR RESPICLICK) 108 (90 Base) MCG/ACT  AEROSOL POWDER, BREATH ACTIVATED Inhale 1 Puff every 6 hours as needed (Wheezing). 1 Each 5   • methocarbamol (ROBAXIN) 500 MG Tab Take 1 tablet by mouth 4 times a day as needed (muscle spasms). 120 tablet 5   • methocarbamol (ROBAXIN) 500 MG Tab Take 2 Tablets by mouth 4 times a day. 120 tablet 0   • diazePAM (VALIUM) 5 MG Tab TAKE 1 TABLET BY MOUTH ONCE 90 MINUTES PRIOR TO PROCEDURE MAY TAKE AN ADDITONAL TABLET 45 MINUTES PRIOR TO PROCEDURE IF STILL FEELING ANXIOU     • potassium chloride SA (KDUR) 20 MEQ Tab CR Take 1 tablet by mouth every day. 90 tablet 0   • gabapentin (NEURONTIN) 600 MG tablet Take 1 tablet by mouth 4 times a day. 360 tablet 4   • phentermine 37.5 MG capsule Take 1 capsule by mouth every morning for 90 days. 90 capsule 1   • methocarbamol (ROBAXIN) 500 MG Tab Take 2 Tabs by mouth 4 times a day. 236 Tab 0   • morphine ER (MS CONTIN) 15 MG Tab CR tablet      • oxyCODONE-acetaminophen (PERCOCET-10)  MG Tab Take 1 Tab by mouth.  0   • furosemide (LASIX) 40 MG Tab Take 1 Tab by mouth every day. 90 Tab 4   • Calcium Carbonate-Vitamin D (CALCIUM 600 + D) 600-400 MG-UNIT Tab Take 1 Tab by mouth 3 times a day.     • Omega-3 Fatty Acids (FISH OIL) 1000 MG Cap capsule Take 1,000 mg by mouth every day.     • Multiple Vitamins-Minerals (ONE-A-DAY WOMENS 50 PLUS PO) Take 1 tablet by mouth every day.     • non-formulary med Take 3 Tabs by mouth 2 Times a Day. Collagen Advanced Formula     • ferrous sulfate 325 (65 Fe) MG tablet Take 1 Tab by mouth every morning with breakfast. 30 Tab 1   • ascorbic acid (VITAMIN C) 500 MG tablet Take 1 Tab by mouth every morning with breakfast. 30 Tab 3   • cyanocobalamin (VITAMIN B12) 1000 MCG Tab Take 1 Tab by mouth every day. 90 Tab 4   • Misc. Devices Misc Adult briefs XXL (FEMALE) FOR URINARY INCONTINENCE- ANY BRAND PER PT PREFERENCE OR INSURANC ALLOWANCE; TO CHANGE QID (Patient not taking: Reported on 12/7/2020) 120 Each 5     No current facility-administered  medications for this visit.       Patient Active Problem List    Diagnosis Date Noted   • Mild intermittent asthma with acute exacerbation 04/25/2012     Priority: High   • Primary osteoarthritis of both knees- sp right TKR 2015; left TKR tbd 2020- dr nowak 06/04/2019   • Administrative encounter- RTC ACCESS/ADA PARATRANSIT ELIGIBILITY 06/04/2019   • S/P hip replacement, bilateral 02/13/2019   • Primary insomnia 01/03/2019   • Mixed stress and urge urinary incontinence 01/03/2019   • Edema 12/13/2018   • Vitamin D deficiency 12/13/2018   • Microcytic anemia- postop THR 2/2019 12/13/2018   • Cervical myelopathy (HCC) 12/11/2018   • Left hip pain 09/18/2018   • DDD (degenerative disc disease), cervical- MOD-SEVERE SPINE NV- dr brennan; fusion and laminectomy C3-T1 12/5/2018 dr brennan 08/09/2018   • Primary osteoarthritis of both hips- dr nowak; left THR done feb 6, 2019; right THR 3/2018 06/07/2018   • Obesity (BMI 30-39.9) 06/07/2018   • DDD (degenerative disc disease), lumbar 04/25/2018   • Uncomplicated opioid dependence (CMS-HCC)- spine nv 01/31/2018   • Essential hypertension 01/31/2018   • Venous insufficiency 10/03/2017   • Chronic bilateral low back pain with bilateral sciatica- pain mgt;    spine nv 07/26/2017   • Vitamin B 12 deficiency 06/02/2016   • History of gastric bypass 04/25/2012       Family History   Problem Relation Age of Onset   • Diabetes Mother    • Heart Disease Mother        She  has a past medical history of Anemia, Arthritis, At risk for falls, Chronic back pain, Dental disorder, Pain (12/04/2018), Pain (02/04/2019), and Urinary incontinence. She also has no past medical history of ASTHMA, Breast cancer (HCC), or Diabetes.  She  has a past surgical history that includes gastric bypass laparoscopic (2002); abdominal exploration; plastic surgery (2004); debridement (5/17/2012); appendectomy laparoscopic (3/21/2013); knee arthroplasty total (Right, 10/20/2015); mammoplasty augmentation  "(Bilateral, 2004); hip arthroplasty total (Right, 3/20/2018); cervical disk and fusion anterior (12/5/2018); corpectomy (12/5/2018); cervical fusion posterior (12/7/2018); cervical laminectomy posterior (12/7/2018); and hip arthroplasty total (Left, 2/13/2019).       Objective:   Ht 1.6 m (5' 3\") Comment: Pt. stated  Wt 97.1 kg (214 lb) Comment: Pt. stated  LMP  (LMP Unknown)   BMI 37.91 kg/m²     Physical Exam:     Constitutional: Alert, no distress, well-groomed.  Skin: No rashes in visible areas.  Eye: Round. Conjunctiva clear, lids normal. No icterus.   ENMT: Lips pink without lesions, good dentition, moist mucous membranes. Phonation normal.  Neck: No masses, no thyromegaly. Moves freely without pain.  Respiratory: Unlabored respiratory effort, no cough or audible wheeze  Psych: Alert and oriented x3, normal affect and mood.       Assessment and Plan:   The following treatment plan was discussed:     1. Mild intermittent asthma without complication  - fluticasone (FLOVENT HFA) 44 MCG/ACT Aerosol; Inhale 2 Puffs 2 times a day. Everyday maintenance steroid inhaler  Dispense: 1 Each; Refill: 11    2. Mild intermittent asthma with acute exacerbation  - ipratropium-albuterol (DUONEB) 0.5-2.5 (3) MG/3ML nebulizer solution; Take 3 mL by nebulization every 6 hours as needed for Shortness of Breath.  Dispense: 120 Each; Refill: 11  - Misc. Devices Misc; NPPB machine for mild intermittent asthma with acute exacerbation to be used every 4 hours as needed acute wheezing with DuoNeb solution as provided by pharmacy, and machine provided by [employer]  Dispense: 1 Each; Refill: 1        Follow-up: No follow-ups on file.         " patient

## 2021-05-29 DIAGNOSIS — E87.6 HYPOKALEMIA: Primary | ICD-10-CM

## 2021-06-01 RX ORDER — POTASSIUM CHLORIDE 20 MEQ/1
TABLET, EXTENDED RELEASE ORAL
Qty: 90 TABLET | Refills: 0 | Status: SHIPPED | OUTPATIENT
Start: 2021-06-01 | End: 2021-10-05 | Stop reason: SDUPTHER

## 2021-06-04 ENCOUNTER — HOSPITAL ENCOUNTER (OUTPATIENT)
Dept: RADIOLOGY | Facility: MEDICAL CENTER | Age: 56
End: 2021-06-04
Attending: PHYSICIAN ASSISTANT
Payer: COMMERCIAL

## 2021-06-04 DIAGNOSIS — I82.403 DEEP VEIN THROMBOSIS (DVT) OF BOTH LOWER EXTREMITIES, UNSPECIFIED CHRONICITY, UNSPECIFIED VEIN (HCC): ICD-10-CM

## 2021-06-04 DIAGNOSIS — M79.604 BILATERAL LEG PAIN: ICD-10-CM

## 2021-06-04 DIAGNOSIS — M79.605 BILATERAL LEG PAIN: ICD-10-CM

## 2021-06-04 PROCEDURE — 93971 EXTREMITY STUDY: CPT | Mod: LT

## 2021-06-17 ENCOUNTER — OFFICE VISIT (OUTPATIENT)
Dept: MEDICAL GROUP | Age: 56
End: 2021-06-17
Payer: COMMERCIAL

## 2021-06-17 VITALS
DIASTOLIC BLOOD PRESSURE: 80 MMHG | BODY MASS INDEX: 40.79 KG/M2 | SYSTOLIC BLOOD PRESSURE: 140 MMHG | WEIGHT: 230.2 LBS | HEART RATE: 94 BPM | OXYGEN SATURATION: 94 % | TEMPERATURE: 99.8 F | HEIGHT: 63 IN

## 2021-06-17 DIAGNOSIS — M51.36 DDD (DEGENERATIVE DISC DISEASE), LUMBAR: ICD-10-CM

## 2021-06-17 DIAGNOSIS — Z02.9 ADMINISTRATIVE ENCOUNTER: ICD-10-CM

## 2021-06-17 DIAGNOSIS — M15.3 POST-TRAUMATIC OSTEOARTHRITIS OF MULTIPLE JOINTS: ICD-10-CM

## 2021-06-17 PROCEDURE — 99212 OFFICE O/P EST SF 10 MIN: CPT | Performed by: INTERNAL MEDICINE

## 2021-06-17 RX ORDER — METHOCARBAMOL 750 MG/1
750 TABLET, FILM COATED ORAL EVERY 6 HOURS PRN
COMMUNITY
Start: 2021-05-23 | End: 2021-06-17

## 2021-06-17 RX ORDER — CEPHALEXIN 500 MG/1
CAPSULE ORAL
COMMUNITY
Start: 2021-06-04 | End: 2021-06-17

## 2021-06-17 ASSESSMENT — ENCOUNTER SYMPTOMS
CONSTITUTIONAL NEGATIVE: 1
NECK PAIN: 1
GASTROINTESTINAL NEGATIVE: 1
MYALGIAS: 1
NEUROLOGICAL NEGATIVE: 1
PSYCHIATRIC NEGATIVE: 1
CARDIOVASCULAR NEGATIVE: 1
EYES NEGATIVE: 1
BACK PAIN: 1
RESPIRATORY NEGATIVE: 1

## 2021-06-18 NOTE — PROGRESS NOTES
Subjective:      Karine Ovalle is a 56 y.o. female who presents with Paperwork (FMLA)  The patient needs her annual paperwork filled out for FMLA for workplace accommodations due to the anticipated need for frequent days off throughout the upcoming year.  This needs to be filled out every June and she is about to run out of the current accommodation FMLA coverage on 6/19/2021.  She will ask for an extension of 1 to 2 weeks for us to finish the paperwork for the coming 12 months effective June 20, 2021 through June 19, 2022.    She says for the past year she has been a lot of up to 480 hours of time off work for FMLA purposes for medical leave, and has used a little over 300 or so this past 12 months.    The anticipated reasons are her polyarthralgias and DJD of multiple joints and degenerative disc disease of the lumbar thoracic and cervical spine.  She is status post bilateral hip replacements and right total knee replacement by Dr. Levine.  She has 1 more joint replacement surgery, the left total knee replacement to be done either later this year or the beginning of next year.  Her spinal surgeries have been completed as well.  Nevertheless she continues to have chronic recurrent back pain and needs frequent doctor visits and time off work for these flareups.  What had been authorized the past is well documented in his and the attached paperwork.  We will reapproved for the upcoming year the same accommodations.  Please refer to this junior from last year dated 7/29/2020.  .        HPI    Review of Systems   Constitutional: Negative.    HENT: Negative.    Eyes: Negative.    Respiratory: Negative.    Cardiovascular: Negative.    Gastrointestinal: Negative.    Genitourinary: Negative.    Musculoskeletal: Positive for back pain, joint pain, myalgias and neck pain.   Skin: Negative.    Neurological: Negative.    Endo/Heme/Allergies: Negative.    Psychiatric/Behavioral: Negative.        No Known  Allergies  Outpatient Medications Prior to Visit   Medication Sig Dispense Refill   • potassium chloride SA (KDUR) 20 MEQ Tab CR Take 1 tablet by mouth once daily 90 tablet 0   • fluticasone (FLOVENT HFA) 44 MCG/ACT Aerosol Inhale 2 Puffs 2 times a day. Everyday maintenance steroid inhaler 1 Each 11   • ipratropium-albuterol (DUONEB) 0.5-2.5 (3) MG/3ML nebulizer solution Take 3 mL by nebulization every 6 hours as needed for Shortness of Breath. 120 Each 11   • Misc. Devices Misc NPPB machine for mild intermittent asthma with acute exacerbation to be used every 4 hours as needed acute wheezing with DuoNeb solution as provided by pharmacy, and machine provided by [employer] 1 Each 1   • Albuterol Sulfate (PROAIR RESPICLICK) 108 (90 Base) MCG/ACT AEROSOL POWDER, BREATH ACTIVATED Inhale 1 Puff every 6 hours as needed (Wheezing). 1 Each 5   • gabapentin (NEURONTIN) 600 MG tablet Take 1 tablet by mouth 4 times a day. 360 tablet 4   • methocarbamol (ROBAXIN) 500 MG Tab Take 2 Tabs by mouth 4 times a day. 236 Tab 0   • morphine ER (MS CONTIN) 15 MG Tab CR tablet      • oxyCODONE-acetaminophen (PERCOCET-10)  MG Tab Take 1 Tab by mouth.  0   • furosemide (LASIX) 40 MG Tab Take 1 Tab by mouth every day. 90 Tab 4   • Calcium Carbonate-Vitamin D (CALCIUM 600 + D) 600-400 MG-UNIT Tab Take 1 Tab by mouth 3 times a day.     • Omega-3 Fatty Acids (FISH OIL) 1000 MG Cap capsule Take 1,000 mg by mouth every day.     • Multiple Vitamins-Minerals (ONE-A-DAY WOMENS 50 PLUS PO) Take 1 tablet by mouth every day.     • ferrous sulfate 325 (65 Fe) MG tablet Take 1 Tab by mouth every morning with breakfast. 30 Tab 1   • cyanocobalamin (VITAMIN B12) 1000 MCG Tab Take 1 Tab by mouth every day. 90 Tab 4   • cephALEXin (KEFLEX) 500 MG Cap TAKE 1 CAPSULE BY MOUTH FOUR TIMES DAILY FOR 7 DAYS (Patient not taking: Reported on 6/17/2021)     • methocarbamol (ROBAXIN) 750 MG Tab Take 750 mg by mouth every 6 hours as needed.  "AS NEEDED FOR MUSCLE SPASM     • methocarbamol (ROBAXIN) 500 MG Tab Take 1 tablet by mouth 4 times a day as needed (muscle spasms). (Patient not taking: Reported on 6/17/2021) 120 tablet 5   • methocarbamol (ROBAXIN) 500 MG Tab Take 2 Tablets by mouth 4 times a day. 120 tablet 0   • diazePAM (VALIUM) 5 MG Tab TAKE 1 TABLET BY MOUTH ONCE 90 MINUTES PRIOR TO PROCEDURE MAY TAKE AN ADDITONAL TABLET 45 MINUTES PRIOR TO PROCEDURE IF STILL FEELING ANXIOU (Patient not taking: Reported on 6/17/2021)     • non-formulary med Take 3 Tabs by mouth 2 Times a Day. Collagen Advanced Formula (Patient not taking: Reported on 6/17/2021)     • Misc. Devices Misc Adult briefs XXL (FEMALE) FOR URINARY INCONTINENCE- ANY BRAND PER PT PREFERENCE OR INSURANC ALLOWANCE; TO CHANGE QID (Patient not taking: Reported on 12/7/2020) 120 Each 5   • ascorbic acid (VITAMIN C) 500 MG tablet Take 1 Tab by mouth every morning with breakfast. 30 Tab 3     No facility-administered medications prior to visit.        Objective:     /80 (BP Location: Left arm, Patient Position: Sitting, BP Cuff Size: Adult)   Pulse 94   Temp 37.7 °C (99.8 °F) (Temporal)   Ht 1.6 m (5' 3\")   Wt 104 kg (230 lb 3.2 oz)   LMP  (LMP Unknown)   SpO2 94%   BMI 40.78 kg/m²      Physical Exam  Vitals reviewed.   Constitutional:       General: She is not in acute distress.     Appearance: She is well-developed. She is not diaphoretic.   HENT:      Head: Normocephalic and atraumatic.      Right Ear: External ear normal.      Left Ear: External ear normal.      Nose: Nose normal.      Mouth/Throat:      Pharynx: No oropharyngeal exudate.   Eyes:      General: No scleral icterus.        Right eye: No discharge.         Left eye: No discharge.      Conjunctiva/sclera: Conjunctivae normal.      Pupils: Pupils are equal, round, and reactive to light.   Neck:      Thyroid: No thyromegaly.      Vascular: No JVD.      Trachea: No tracheal deviation.   Cardiovascular:      Rate and " Rhythm: Normal rate and regular rhythm.      Heart sounds: Normal heart sounds. No murmur heard.   No friction rub. No gallop.    Pulmonary:      Effort: Pulmonary effort is normal. No respiratory distress.      Breath sounds: Normal breath sounds. No stridor. No wheezing or rales.   Chest:      Chest wall: No tenderness.   Abdominal:      General: Bowel sounds are normal. There is no distension.      Palpations: Abdomen is soft. There is no mass.      Tenderness: There is no abdominal tenderness. There is no guarding or rebound.   Musculoskeletal:         General: No tenderness. Normal range of motion.      Cervical back: Normal range of motion and neck supple.   Lymphadenopathy:      Cervical: No cervical adenopathy.   Skin:     General: Skin is warm and dry.      Coloration: Skin is not pale.      Findings: No erythema or rash.   Neurological:      Mental Status: She is alert and oriented to person, place, and time.      Cranial Nerves: No cranial nerve deficit.      Motor: No abnormal muscle tone.      Coordination: Coordination normal.      Deep Tendon Reflexes: Reflexes are normal and symmetric. Reflexes normal.   Psychiatric:         Behavior: Behavior normal.         Thought Content: Thought content normal.         Judgment: Judgment normal.             No visits with results within 1 Month(s) from this visit.   Latest known visit with results is:   Hospital Outpatient Visit on 05/21/2020   Component Date Value   • Significant Indicator 05/21/2020 POS*   • Source 05/21/2020 WND    • Site 05/21/2020 Right Leg    • Culture Result 05/21/2020 -*   • Gram Stain Result 05/21/2020 No organisms seen.    • Culture Result 05/21/2020 *                    Value:Pseudomonas aeruginosa  Moderate growth  P.aeruginosa may develop resistance during prolonged therapy  with all antibiotics. Isolates that are initially susceptible  may become resistant within three to four days after  initiation of therapy. Testing of repeat  isolates may be  warranted.     • Significant Indicator 05/21/2020 .    • Source 05/21/2020 WND    • Site 05/21/2020 Right Leg    • Gram Stain Result 05/21/2020 No organisms seen.       Lab Results   Component Value Date/Time    HBA1C 5.0 02/16/2019 05:52 AM    HBA1C 5.9 (H) 12/12/2018 06:00 AM     Lab Results   Component Value Date/Time    SODIUM 139 02/16/2019 05:52 AM    POTASSIUM 3.6 02/16/2019 05:52 AM    CHLORIDE 108 02/16/2019 05:52 AM    CO2 27 02/16/2019 05:52 AM    GLUCOSE 105 (H) 02/16/2019 05:52 AM    BUN 12 02/16/2019 05:52 AM    CREATININE 0.39 (L) 02/16/2019 05:52 AM    CREATININE 0.88 08/14/2010 12:00 AM    BUNCREATRAT 13 08/14/2010 12:00 AM    GLOMRATE >59 08/14/2010 12:00 AM    ALKPHOSPHAT 94 02/16/2019 05:52 AM    ASTSGOT 14 02/16/2019 05:52 AM    ALTSGPT 6 02/16/2019 05:52 AM    TBILIRUBIN 0.3 02/16/2019 05:52 AM     Lab Results   Component Value Date/Time    INR 1.00 02/13/2019 11:44 AM    INR 1.01 12/04/2018 11:15 AM    INR 1.04 05/18/2012 12:20 PM     Lab Results   Component Value Date/Time    CHOLSTRLTOT 168 02/22/2018 05:05 PM    LDL 84 02/22/2018 05:05 PM    HDL 67 02/22/2018 05:05 PM    TRIGLYCERIDE 85 02/22/2018 05:05 PM       No results found for: TESTOSTERONE  Lab Results   Component Value Date/Time    TSH 2.110 08/14/2010 12:00 AM     Lab Results   Component Value Date/Time    FREET4 0.72 05/03/2016 07:32 AM     Lab Results   Component Value Date/Time    URICACID 5.6 08/12/2014 04:49 PM     No components found for: VITB12  Lab Results   Component Value Date/Time    25HYDROXY 17 (L) 02/15/2019 03:30 AM    25HYDROXY 14 (L) 12/12/2018 06:00 AM                 Assessment/Plan:        1. Administrative encounter- FMLA paper work - [employer] accommadations for frequent missed days work due to DDD and DJD- [employer]  Paperwork to be filled out this coming week and hopefully by tomorrow.  Once done we will fax the appropriate authorities.    2. DDD (degenerative disc disease), lumbar  This  problem is stable and under treatment but will have frequent flareups as described in the note for FMLA    3. Post-traumatic osteoarthritis of multiple joints     This is stable except for her left knee which requires total knee replacement to be done later this year or next.  Other joints are stable.  Including both hips and right knee.  Status post replacements of all those 3 joints.    Recheck in 3 months for continued follow-up of her disabilities.  However the patient does continue to work with workplace accommodations in accordance with these disabilities.

## 2021-06-23 ENCOUNTER — OFFICE VISIT (OUTPATIENT)
Dept: MEDICAL GROUP | Age: 56
End: 2021-06-23
Payer: COMMERCIAL

## 2021-06-23 VITALS
HEIGHT: 60 IN | OXYGEN SATURATION: 96 % | BODY MASS INDEX: 45.16 KG/M2 | TEMPERATURE: 98.8 F | DIASTOLIC BLOOD PRESSURE: 80 MMHG | SYSTOLIC BLOOD PRESSURE: 130 MMHG | WEIGHT: 230 LBS | HEART RATE: 103 BPM

## 2021-06-23 DIAGNOSIS — R06.2 WHEEZING: ICD-10-CM

## 2021-06-23 DIAGNOSIS — M50.30 DDD (DEGENERATIVE DISC DISEASE), CERVICAL: ICD-10-CM

## 2021-06-23 DIAGNOSIS — Z02.9 ADMINISTRATIVE ENCOUNTER: ICD-10-CM

## 2021-06-23 DIAGNOSIS — G89.4 CHRONIC PAIN SYNDROME: ICD-10-CM

## 2021-06-23 DIAGNOSIS — J44.89 COPD WITH ASTHMA (HCC): ICD-10-CM

## 2021-06-23 DIAGNOSIS — M51.36 DDD (DEGENERATIVE DISC DISEASE), LUMBAR: ICD-10-CM

## 2021-06-23 PROCEDURE — 99214 OFFICE O/P EST MOD 30 MIN: CPT | Performed by: INTERNAL MEDICINE

## 2021-06-23 RX ORDER — METHOCARBAMOL 500 MG/1
1000 TABLET, FILM COATED ORAL 4 TIMES DAILY
Qty: 236 TABLET | Refills: 0 | Status: SHIPPED | OUTPATIENT
Start: 2021-06-23 | End: 2021-08-27 | Stop reason: SDUPTHER

## 2021-06-23 RX ORDER — IPRATROPIUM BROMIDE AND ALBUTEROL SULFATE 2.5; .5 MG/3ML; MG/3ML
3 SOLUTION RESPIRATORY (INHALATION) 4 TIMES DAILY
Qty: 120 ML | Refills: 3 | Status: SHIPPED | OUTPATIENT
Start: 2021-06-23 | End: 2021-07-26

## 2021-06-23 ASSESSMENT — ENCOUNTER SYMPTOMS
CONSTITUTIONAL NEGATIVE: 1
WHEEZING: 1
GASTROINTESTINAL NEGATIVE: 1
NEUROLOGICAL NEGATIVE: 1
PSYCHIATRIC NEGATIVE: 1
BACK PAIN: 1
EYES NEGATIVE: 1
SHORTNESS OF BREATH: 1

## 2021-06-24 NOTE — PROGRESS NOTES
Subjective:      Karine Ovalle is a 56 y.o. female who presents with Paperwork (1 wk F/V ) and Medication Management (would like generic of ipratropium-albuterol (DUONEB) 0.5-2.5 (3) MG/3ML nebulizer solution )            HPI    Review of Systems   Constitutional: Negative.    HENT: Negative.    Eyes: Negative.    Respiratory: Positive for shortness of breath and wheezing.    Cardiovascular: Positive for leg swelling.   Gastrointestinal: Negative.    Genitourinary: Negative.    Musculoskeletal: Positive for back pain and joint pain.   Skin: Negative.    Neurological: Negative.    Endo/Heme/Allergies: Negative.    Psychiatric/Behavioral: Negative.           Objective:     /80 (BP Location: Left arm, Patient Position: Sitting, BP Cuff Size: Adult)   Pulse (!) 103   Temp 37.1 °C (98.8 °F) (Temporal)   Ht 1.524 m (5')   Wt 104 kg (230 lb)   LMP  (LMP Unknown)   SpO2 96%   BMI 44.92 kg/m²      Physical Exam  Vitals reviewed.   Constitutional:       General: She is not in acute distress.     Appearance: She is well-developed. She is not diaphoretic.   HENT:      Head: Normocephalic and atraumatic.      Right Ear: External ear normal.      Left Ear: External ear normal.      Nose: Nose normal.      Mouth/Throat:      Pharynx: No oropharyngeal exudate.   Eyes:      General: No scleral icterus.        Right eye: No discharge.         Left eye: No discharge.      Conjunctiva/sclera: Conjunctivae normal.      Pupils: Pupils are equal, round, and reactive to light.   Neck:      Thyroid: No thyromegaly.      Vascular: No JVD.      Trachea: No tracheal deviation.   Cardiovascular:      Rate and Rhythm: Normal rate and regular rhythm.      Heart sounds: Normal heart sounds. No murmur heard.   No friction rub. No gallop.    Pulmonary:      Effort: Pulmonary effort is normal. No respiratory distress.      Breath sounds: No stridor. Wheezing present. No rales.   Chest:      Chest wall: No tenderness.   Abdominal:       General: Bowel sounds are normal. There is no distension.      Palpations: Abdomen is soft. There is no mass.      Tenderness: There is no abdominal tenderness. There is no guarding or rebound.   Musculoskeletal:         General: Tenderness present. Normal range of motion.      Cervical back: Normal range of motion and neck supple.   Lymphadenopathy:      Cervical: No cervical adenopathy.   Skin:     General: Skin is warm and dry.      Coloration: Skin is not pale.      Findings: No erythema or rash.   Neurological:      Mental Status: She is alert and oriented to person, place, and time.      Cranial Nerves: No cranial nerve deficit.      Motor: No abnormal muscle tone.      Coordination: Coordination normal.      Deep Tendon Reflexes: Reflexes are normal and symmetric. Reflexes normal.   Psychiatric:         Behavior: Behavior normal.         Thought Content: Thought content normal.         Judgment: Judgment normal.                        Assessment/Plan:        1. Administrative [id number] [facility] paperwork for workplace accommodation for chronic DDD and DJD hips and shoulders bilaterally status post multiple joint replacements  Paperwork still not completed.  There is still some odds and ends that need corrections according to the patient.  She was advised to come back in 3 days on Friday, June 25 to meet with VERO Khan to complete the forms.  This will be a nonprovider visit.    2. Chronic pain syndrome  Continue current regimen under good control.    3. DDD (degenerative disc disease), lumbar  Continue current regimen.  Refill muscle accidents.  - methocarbamol (ROBAXIN) 500 MG Tab; Take 2 Tablets by mouth 4 times a day.  Dispense: 236 tablet; Refill: 0    4. DDD (degenerative disc disease), cervical- [facility]- dr brennan; fusion and laminectomy C3-T1 12/5/2018 dr brennan       As above  - methocarbamol (ROBAXIN) 500 MG Tab; Take 2 Tablets by mouth 4 times a day.  Dispense: 236 tablet;  Refill: 0    5. Wheezing  Continue current regimen.  Stable at this time  - ipratropium-albuterol (DUONEB) 0.5-2.5 (3) MG/3ML nebulizer solution; Take 3 mL by nebulization 4 times a day.  Dispense: 120 mL; Refill: 3  - Misc. Devices Misc; NPPB machine for 4 times daily   NPPB treatments with generic DuoNeb  Dispense: 1 Each; Refill: 1    6. COPD with asthma (HCC)  Stable continue current regimen  - ipratropium-albuterol (DUONEB) 0.5-2.5 (3) MG/3ML nebulizer solution; Take 3 mL by nebulization 4 times a day.  Dispense: 120 mL; Refill: 3  - Misc. Devices Misc; NPPB machine for 4 times daily   NPPB treatments with generic DuoNeb  Dispense: 1 Each; Refill: 1

## 2021-07-12 ENCOUNTER — APPOINTMENT (OUTPATIENT)
Dept: RADIOLOGY | Facility: MEDICAL CENTER | Age: 56
End: 2021-07-12
Attending: INTERNAL MEDICINE
Payer: COMMERCIAL

## 2021-07-12 ENCOUNTER — PATIENT MESSAGE (OUTPATIENT)
Dept: MEDICAL GROUP | Age: 56
End: 2021-07-12

## 2021-07-14 ENCOUNTER — TELEPHONE (OUTPATIENT)
Dept: MEDICAL GROUP | Age: 56
End: 2021-07-14

## 2021-07-14 NOTE — TELEPHONE ENCOUNTER
Phone Number Called: [phone] (home) [phone] (work)    Call outcome: Did not leave a detailed message. Requested patient to call back.    Message: lvm in regards to patinet no show on 07/12/2021 and to reschedule appointment

## 2021-07-21 ENCOUNTER — OFFICE VISIT (OUTPATIENT)
Dept: URGENT CARE | Facility: CLINIC | Age: 56
End: 2021-07-21
Payer: COMMERCIAL

## 2021-07-21 ENCOUNTER — APPOINTMENT (OUTPATIENT)
Dept: RADIOLOGY | Facility: IMAGING CENTER | Age: 56
End: 2021-07-21
Attending: PHYSICIAN ASSISTANT
Payer: COMMERCIAL

## 2021-07-21 VITALS
DIASTOLIC BLOOD PRESSURE: 84 MMHG | HEIGHT: 63 IN | SYSTOLIC BLOOD PRESSURE: 136 MMHG | TEMPERATURE: 98.3 F | OXYGEN SATURATION: 96 % | HEART RATE: 111 BPM | RESPIRATION RATE: 16 BRPM | WEIGHT: 230 LBS | BODY MASS INDEX: 40.75 KG/M2

## 2021-07-21 DIAGNOSIS — M25.551 RIGHT HIP PAIN: ICD-10-CM

## 2021-07-21 DIAGNOSIS — Z96.641 STATUS POST RIGHT HIP REPLACEMENT: ICD-10-CM
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
## 2021-07-21 DIAGNOSIS — F11.90 CHRONIC NARCOTIC USE: ICD-10-CM

## 2021-07-21 PROCEDURE — 73501 X-RAY EXAM HIP UNI 1 VIEW: CPT | Mod: TC,FY,RT | Performed by: PHYSICIAN ASSISTANT

## 2021-07-21 PROCEDURE — 99214 OFFICE O/P EST MOD 30 MIN: CPT | Performed by: PHYSICIAN ASSISTANT

## 2021-07-21 RX ORDER — KETOROLAC TROMETHAMINE 30 MG/ML
15 INJECTION, SOLUTION INTRAMUSCULAR; INTRAVENOUS ONCE
Status: COMPLETED | OUTPATIENT
Start: 2021-07-21 | End: 2021-07-21

## 2021-07-21 RX ADMIN — KETOROLAC TROMETHAMINE 15 MG: 30 INJECTION, SOLUTION INTRAMUSCULAR; INTRAVENOUS at 19:37

## 2021-07-21 ASSESSMENT — ENCOUNTER SYMPTOMS
LOSS OF CONSCIOUSNESS: 0
FALLS: 1

## 2021-07-21 NOTE — LETTER
July 21, 2021    To Whom It May Concern:         This is confirmation that Karine Ovalle was seen and treated by Judith Oquendo P.A.-C. on 7/21/21. She may return to work on Friday 7/23/21.         If you have any questions please do not hesitate to call me at the phone number listed below.    Sincerely,           Judith Oquendo P.A.-C.  [phone]

## 2021-07-22 NOTE — PROGRESS NOTES
"Subjective:   Karine Ovalle  is a 56 y.o. female who presents for Fall (occurred last night, fell onto R side, R hip pain, R shoulder pain)      Patient identity confirmed using two patient identifiers of last name and date of birth.    Patient presents to urgent care following ground-level fall that occurred last evening.  Patient was walking in her home when she tripped over her 2 small dogs causing her to fall onto the right side.  She reports that she bruised her right arm but her arm is not painful.  However, she did land directly onto her right hip.  Since that time, she has been having pain in the right buttock region with limited range of motion.  Patient does have history of hip replacement on the right hip.  Patient did not strike her head or have loss consciousness.      Fall  Pertinent negatives include no loss of consciousness.     Review of Systems   Musculoskeletal: Positive for falls and joint pain.   Neurological: Negative for loss of consciousness.   All other systems reviewed and are negative.    No Known Allergies  Reviewed past medical, surgical , social and family history.  Reviewed prescription and over-the-counter medications with patient and electronic health record today.     Objective:   /84 (BP Location: Left arm, Patient Position: Sitting, BP Cuff Size: Adult)   Pulse (!) 111   Temp 36.8 °C (98.3 °F) (Temporal)   Resp 16   Ht 1.6 m (5' 3\")   Wt 104 kg (230 lb)   LMP  (LMP Unknown)   SpO2 96%   BMI 40.74 kg/m²   Physical Exam  Vitals reviewed.   Constitutional:       General: She is not in acute distress.     Appearance: She is well-developed. She is not ill-appearing or toxic-appearing.   HENT:      Head: Normocephalic and atraumatic.      Right Ear: External ear normal.      Left Ear: External ear normal.      Mouth/Throat:      Lips: Pink. No lesions.      Pharynx: Uvula midline.   Eyes:      General: Lids are normal.      Extraocular Movements: Extraocular " movements intact.      Conjunctiva/sclera: Conjunctivae normal.      Pupils: Pupils are equal, round, and reactive to light.   Cardiovascular:      Rate and Rhythm: Normal rate and regular rhythm.      Heart sounds: Normal heart sounds. No murmur heard.   No friction rub. No gallop.    Pulmonary:      Effort: Pulmonary effort is normal. No respiratory distress.      Breath sounds: Normal breath sounds.   Abdominal:      General: Bowel sounds are normal. There is no distension.      Palpations: Abdomen is soft. There is no mass.      Tenderness: There is no abdominal tenderness. There is no guarding or rebound.   Musculoskeletal:         General: No deformity.      Cervical back: Normal range of motion and neck supple.      Lumbar back: Tenderness present. No bony tenderness.        Back:       Right hip: Tenderness and bony tenderness present. Decreased range of motion.        Legs:    Lymphadenopathy:      Head:      Right side of head: No submental, submandibular or tonsillar adenopathy.      Left side of head: No submental, submandibular or tonsillar adenopathy.      Cervical: No cervical adenopathy.      Upper Body:      Right upper body: No supraclavicular adenopathy.      Left upper body: No supraclavicular adenopathy.   Skin:     General: Skin is warm and dry.      Findings: No rash.   Neurological:      Mental Status: She is alert and oriented to person, place, and time.      Cranial Nerves: Cranial nerves are intact. No cranial nerve deficit.      Sensory: Sensation is intact. No sensory deficit.      Motor: Motor function is intact.      Coordination: Coordination is intact. Coordination normal.      Gait: Gait is intact.   Psychiatric:         Attention and Perception: Attention normal.         Mood and Affect: Mood and affect normal.         Speech: Speech normal.         Behavior: Behavior normal. Behavior is cooperative.         Thought Content: Thought content normal.         Judgment: Judgment  normal.           Assessment/Plan:   1. Fall from ground level  - DX-HIP-UNILATERAL-WITH PELVIS-1 VIEW RIGHT; Future  - ketorolac (TORADOL) injection 15 mg    2. Right hip pain  - DX-HIP-UNILATERAL-WITH PELVIS-1 VIEW RIGHT; Future  - ketorolac (TORADOL) injection 15 mg    3. Status post right hip replacement  - DX-HIP-UNILATERAL-WITH PELVIS-1 VIEW RIGHT; Future  - ketorolac (TORADOL) injection 15 mg    4. Chronic narcotic use  - ketorolac (TORADOL) injection 15 mg    Xray is obtained, read by radiologist and reviewed by myself with findings as follows:      RADIOLOGY RESULTS   DX-HIP-UNILATERAL-WITH PELVIS-1 VIEW RIGHT    Result Date: 7/21/2021 7/21/2021 7:16 PM HISTORY/REASON FOR EXAM:  Pelvic/Hip Pain Following Trauma. TECHNIQUE/EXAM DESCRIPTION AND NUMBER OF VIEWS:  2 views of the RIGHT hip. COMPARISON: None FINDINGS: BONE MINERALIZATION: Normal. JOINTS: Bilateral hip arthroplasties are well seated. FRACTURE: None. DISLOCATION: None. SOFT TISSUES: No mass. Pelvic surgical clips.     No acute osseous abnormality. Bilateral hip arthroplasties are well seated.           Patient is given Toradol in clinic as above.  Avoid NSAIDs today.  Patient already has prescription for which she is on chronically.  No additional pain medication at this time.  Follow-up with primary care.  Work note provided.  Differential diagnosis, natural history, supportive care, and indications for immediate follow-up discussed.     Red flag warning symptoms and strict ER/follow-up precautions given.  The patient demonstrated a good understanding and agreed with the treatment plan.    Upon entering exam room I ensured patient was wearing a mask.  This provider wore appropriate PPE throughout entire visit.  Patient wore mask entire visit except for a brief period while examining oropharynx.    Please note that this note was created using voice recognition speech to text software. Every effort has been made to correct obvious errors.  However,  I expect there are errors of grammar and possibly context that were not discovered prior to finalizing the note  S. ROMA Oquendo

## 2021-07-26 ENCOUNTER — OFFICE VISIT (OUTPATIENT)
Dept: MEDICAL GROUP | Age: 56
End: 2021-07-26
Payer: COMMERCIAL

## 2021-07-26 ENCOUNTER — HOSPITAL ENCOUNTER (OUTPATIENT)
Dept: LAB | Facility: MEDICAL CENTER | Age: 56
End: 2021-07-26
Attending: INTERNAL MEDICINE
Payer: COMMERCIAL

## 2021-07-26 VITALS
HEIGHT: 60 IN | BODY MASS INDEX: 42.8 KG/M2 | WEIGHT: 218 LBS | DIASTOLIC BLOOD PRESSURE: 88 MMHG | HEART RATE: 92 BPM | OXYGEN SATURATION: 93 % | TEMPERATURE: 99.1 F | SYSTOLIC BLOOD PRESSURE: 144 MMHG

## 2021-07-26 DIAGNOSIS — Z12.11 SCREEN FOR COLON CANCER: ICD-10-CM

## 2021-07-26 DIAGNOSIS — E78.5 DYSLIPIDEMIA: ICD-10-CM

## 2021-07-26 DIAGNOSIS — Z00.00 HEALTHCARE MAINTENANCE: ICD-10-CM

## 2021-07-26 DIAGNOSIS — D50.8 OTHER IRON DEFICIENCY ANEMIA: ICD-10-CM

## 2021-07-26 DIAGNOSIS — E55.9 VITAMIN D DEFICIENCY: ICD-10-CM

## 2021-07-26 DIAGNOSIS — M50.30 DDD (DEGENERATIVE DISC DISEASE), CERVICAL: ICD-10-CM

## 2021-07-26 DIAGNOSIS — E66.9 OBESITY (BMI 30-39.9): ICD-10-CM

## 2021-07-26 DIAGNOSIS — Z12.31 ENCOUNTER FOR SCREENING MAMMOGRAM FOR BREAST CANCER: ICD-10-CM

## 2021-07-26 DIAGNOSIS — M51.36 DDD (DEGENERATIVE DISC DISEASE), LUMBAR: ICD-10-CM

## 2021-07-26 DIAGNOSIS — J45.21 MILD INTERMITTENT ASTHMA WITH ACUTE EXACERBATION: ICD-10-CM

## 2021-07-26 DIAGNOSIS — I10 ESSENTIAL HYPERTENSION: ICD-10-CM

## 2021-07-26 DIAGNOSIS — R73.01 IFG (IMPAIRED FASTING GLUCOSE): ICD-10-CM

## 2021-07-26 DIAGNOSIS — J44.89 COPD WITH ASTHMA (HCC): ICD-10-CM

## 2021-07-26 DIAGNOSIS — F11.20 UNCOMPLICATED OPIOID DEPENDENCE (HCC): ICD-10-CM

## 2021-07-26 DIAGNOSIS — G89.4 CHRONIC PAIN SYNDROME: ICD-10-CM

## 2021-07-26 LAB
25(OH)D3 SERPL-MCNC: 50 NG/ML (ref 30–100)
ALBUMIN SERPL BCP-MCNC: 3.7 G/DL (ref 3.2–4.9)
ALBUMIN/GLOB SERPL: 1.1 G/DL
ALP SERPL-CCNC: 105 U/L (ref 30–99)
ALT SERPL-CCNC: 22 U/L (ref 2–50)
ANION GAP SERPL CALC-SCNC: 7 MMOL/L (ref 7–16)
AST SERPL-CCNC: 27 U/L (ref 12–45)
BASOPHILS # BLD AUTO: 0.5 % (ref 0–1.8)
BASOPHILS # BLD: 0.04 K/UL (ref 0–0.12)
BILIRUB SERPL-MCNC: 0.5 MG/DL (ref 0.1–1.5)
BUN SERPL-MCNC: 14 MG/DL (ref 8–22)
CALCIUM SERPL-MCNC: 9 MG/DL (ref 8.4–10.2)
CHLORIDE SERPL-SCNC: 106 MMOL/L (ref 96–112)
CHOLEST SERPL-MCNC: 147 MG/DL (ref 100–199)
CO2 SERPL-SCNC: 25 MMOL/L (ref 20–33)
CREAT SERPL-MCNC: 0.58 MG/DL (ref 0.5–1.4)
EOSINOPHIL # BLD AUTO: 0.34 K/UL (ref 0–0.51)
EOSINOPHIL NFR BLD: 4.1 % (ref 0–6.9)
ERYTHROCYTE [DISTWIDTH] IN BLOOD BY AUTOMATED COUNT: 44.8 FL (ref 35.9–50)
EST. AVERAGE GLUCOSE BLD GHB EST-MCNC: 100 MG/DL
FASTING STATUS PATIENT QL REPORTED: NORMAL
FERRITIN SERPL-MCNC: 124 NG/ML (ref 10–291)
GLOBULIN SER CALC-MCNC: 3.3 G/DL (ref 1.9–3.5)
GLUCOSE SERPL-MCNC: 96 MG/DL (ref 65–99)
HBA1C MFR BLD: 5.1 % (ref 4–5.6)
HCT VFR BLD AUTO: 37.9 % (ref 37–47)
HDLC SERPL-MCNC: 75 MG/DL
HGB BLD-MCNC: 12.9 G/DL (ref 12–16)
IMM GRANULOCYTES # BLD AUTO: 0.03 K/UL (ref 0–0.11)
IMM GRANULOCYTES NFR BLD AUTO: 0.4 % (ref 0–0.9)
IRON SATN MFR SERPL: 44 % (ref 15–55)
IRON SERPL-MCNC: 126 UG/DL (ref 40–170)
LDLC SERPL CALC-MCNC: 63 MG/DL
LYMPHOCYTES # BLD AUTO: 2.53 K/UL (ref 1–4.8)
LYMPHOCYTES NFR BLD: 30.2 % (ref 22–41)
MCH RBC QN AUTO: 30 PG (ref 27–33)
MCHC RBC AUTO-ENTMCNC: 34 G/DL (ref 33.6–35)
MCV RBC AUTO: 88.1 FL (ref 81.4–97.8)
MONOCYTES # BLD AUTO: 0.53 K/UL (ref 0–0.85)
MONOCYTES NFR BLD AUTO: 6.3 % (ref 0–13.4)
NEUTROPHILS # BLD AUTO: 4.92 K/UL (ref 2–7.15)
NEUTROPHILS NFR BLD: 58.5 % (ref 44–72)
NRBC # BLD AUTO: 0 K/UL
NRBC BLD-RTO: 0 /100 WBC
PLATELET # BLD AUTO: 224 K/UL (ref 164–446)
PMV BLD AUTO: 9.5 FL (ref 9–12.9)
POTASSIUM SERPL-SCNC: 3.9 MMOL/L (ref 3.6–5.5)
PROT SERPL-MCNC: 7 G/DL (ref 6–8.2)
RBC # BLD AUTO: 4.3 M/UL (ref 4.2–5.4)
SODIUM SERPL-SCNC: 138 MMOL/L (ref 135–145)
TIBC SERPL-MCNC: 289 UG/DL (ref 250–450)
TRIGL SERPL-MCNC: 44 MG/DL (ref 0–149)
TSH SERPL DL<=0.005 MIU/L-ACNC: 1.38 UIU/ML (ref 0.38–5.33)
UIBC SERPL-MCNC: 163 UG/DL (ref 110–370)
WBC # BLD AUTO: 8.4 K/UL (ref 4.8–10.8)

## 2021-07-26 PROCEDURE — 84443 ASSAY THYROID STIM HORMONE: CPT

## 2021-07-26 PROCEDURE — 99214 OFFICE O/P EST MOD 30 MIN: CPT | Performed by: INTERNAL MEDICINE

## 2021-07-26 PROCEDURE — 82728 ASSAY OF FERRITIN: CPT

## 2021-07-26 PROCEDURE — 83036 HEMOGLOBIN GLYCOSYLATED A1C: CPT

## 2021-07-26 PROCEDURE — 83550 IRON BINDING TEST: CPT

## 2021-07-26 PROCEDURE — 80053 COMPREHEN METABOLIC PANEL: CPT

## 2021-07-26 PROCEDURE — 36415 COLL VENOUS BLD VENIPUNCTURE: CPT

## 2021-07-26 PROCEDURE — 85025 COMPLETE CBC W/AUTO DIFF WBC: CPT

## 2021-07-26 PROCEDURE — 83540 ASSAY OF IRON: CPT

## 2021-07-26 PROCEDURE — 82306 VITAMIN D 25 HYDROXY: CPT

## 2021-07-26 PROCEDURE — 80061 LIPID PANEL: CPT

## 2021-07-26 RX ORDER — METHOCARBAMOL 500 MG/1
1000 TABLET, FILM COATED ORAL 4 TIMES DAILY
Qty: 120 TABLET | Refills: 0 | Status: SHIPPED | OUTPATIENT
Start: 2021-07-26 | End: 2021-08-13

## 2021-07-26 RX ORDER — PHENTERMINE HYDROCHLORIDE 37.5 MG/1
37.5 CAPSULE ORAL EVERY MORNING
Qty: 90 CAPSULE | Refills: 1 | Status: SHIPPED | OUTPATIENT
Start: 2021-07-26 | End: 2021-10-25 | Stop reason: SDUPTHER

## 2021-07-26 ASSESSMENT — ENCOUNTER SYMPTOMS
CONSTITUTIONAL NEGATIVE: 1
NEUROLOGICAL NEGATIVE: 1
RESPIRATORY NEGATIVE: 1
CARDIOVASCULAR NEGATIVE: 1
EYES NEGATIVE: 1
GASTROINTESTINAL NEGATIVE: 1
PSYCHIATRIC NEGATIVE: 1
MUSCULOSKELETAL NEGATIVE: 1

## 2021-07-26 NOTE — PROGRESS NOTES
Subjective:      Karine Ovalle is a 56 y.o. female who presents with Annual Exam (needs labs order), Paperwork (needs letter for severe allergic reaction to cigarette smoke), and Referral Needed (pulmonary)  The patient is here for followup of chronic medical problems listed below. The patient is compliant with medications and having no side effects from them. Denies chest pain, abdominal pain, dyspnea, myalgias, or cough.   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 for workplace accommodation for chronic DDD and DJD hips and shoulders bilaterally status post multiple joint replacements 06/23/2021   • Primary osteoarthritis of both knees- sp right TKR 2015; left TKR tbd 2020- dr nowak 06/04/2019   • S/P hip replacement, bilateral 02/13/2019   • Primary insomnia 01/03/2019   • Mixed stress and urge urinary incontinence 01/03/2019   • Edema 12/13/2018   • Vitamin D deficiency 12/13/2018   • Microcytic anemia- postop THR 2/2019 12/13/2018   • Cervical myelopathy (HCC) 12/11/2018   • Left hip pain 09/18/2018   • DDD (degenerative disc disease), cervical- MOD-SEVERE SPINE NV- dr brennan; fusion and laminectomy C3-T1 12/5/2018 dr brennan 08/09/2018   • Primary osteoarthritis of both hips- dr nowak; left THR done feb 6, 2019; right THR 3/2018 06/07/2018   • Obesity (BMI 30-39.9) 06/07/2018   • DDD (degenerative disc disease), lumbar 04/25/2018   • Uncomplicated opioid dependence (CMS-HCC)- spine nv 01/31/2018   • Essential hypertension 01/31/2018   • Venous insufficiency 10/03/2017   • Chronic bilateral low back pain with bilateral sciatica- pain mgt;    spine nv 07/26/2017   • Chronic pain syndrome- nv adv pain, dr sanders 06/08/2017   • Vitamin B 12 deficiency 06/02/2016   • History of gastric bypass 04/25/2012   • Mild intermittent asthma with acute exacerbation 04/25/2012     Allergies   Allergen Reactions   • Tobacco [[place] Tabacum] Shortness of  Breath     Cigarette smoke causes SOB, rispatory issues     Outpatient Medications Prior to Visit   Medication Sig Dispense Refill   • albuterol 108 (90 Base) MCG/ACT Aero Soln inhalation aerosol Inhale 2 Puffs every four hours as needed for Shortness of Breath for up to 30 days. 18 g 11   • methocarbamol (ROBAXIN) 500 MG Tab Take 2 Tablets by mouth 4 times a day. 236 tablet 0   • Misc. Devices Misc NPPB machine for 4 times daily   NPPB treatments with generic DuoNeb 1 Each 1   • potassium chloride SA (KDUR) 20 MEQ Tab CR Take 1 tablet by mouth once daily 90 tablet 0   • ipratropium-albuterol (DUONEB) 0.5-2.5 (3) MG/3ML nebulizer solution Take 3 mL by nebulization every 6 hours as needed for Shortness of Breath. 120 Each 11   • Misc. Devices Misc NPPB machine for mild intermittent asthma with acute exacerbation to be used every 4 hours as needed acute wheezing with DuoNeb solution as provided by pharmacy, and machine provided by [employer] 1 Each 1   • Albuterol Sulfate (PROAIR RESPICLICK) 108 (90 Base) MCG/ACT AEROSOL POWDER, BREATH ACTIVATED Inhale 1 Puff every 6 hours as needed (Wheezing). 1 Each 5   • gabapentin (NEURONTIN) 600 MG tablet Take 1 tablet by mouth 4 times a day. 360 tablet 4   • morphine ER (MS CONTIN) 15 MG Tab CR tablet      • oxyCODONE-acetaminophen (PERCOCET-10)  MG Tab Take 1 Tab by mouth.  0   • furosemide (LASIX) 40 MG Tab Take 1 Tab by mouth every day. 90 Tab 4   • Calcium Carbonate-Vitamin D (CALCIUM 600 + D) 600-400 MG-UNIT Tab Take 1 Tab by mouth 3 times a day.     • Omega-3 Fatty Acids (FISH OIL) 1000 MG Cap capsule Take 1,000 mg by mouth every day.     • Multiple Vitamins-Minerals (ONE-A-DAY WOMENS 50 PLUS PO) Take 1 tablet by mouth every day.     • ferrous sulfate 325 (65 Fe) MG tablet Take 1 Tab by mouth every morning with breakfast. 30 Tab 1   • cyanocobalamin (VITAMIN B12) 1000 MCG Tab Take 1 Tab by mouth every day. 90 Tab 4   • ipratropium-albuterol  (DUONEB) 0.5-2.5 (3) MG/3ML nebulizer solution Take 3 mL by nebulization 4 times a day. 120 mL 3   • fluticasone (FLOVENT HFA) 44 MCG/ACT Aerosol Inhale 2 Puffs 2 times a day. Everyday maintenance steroid inhaler 1 Each 11     No facility-administered medications prior to visit.     No visits with results within 1 Month(s) from this visit.   Latest known visit with results is:   Hospital Outpatient Visit on 05/21/2020   Component Date Value   • Significant Indicator 05/21/2020 POS*   • Source 05/21/2020 WND    • Site 05/21/2020 Right Leg    • Culture Result 05/21/2020 -*   • Gram Stain Result 05/21/2020 No organisms seen.    • Culture Result 05/21/2020 *                    Value:Pseudomonas aeruginosa  Moderate growth  P.aeruginosa may develop resistance during prolonged therapy  with all antibiotics. Isolates that are initially susceptible  may become resistant within three to four days after  initiation of therapy. Testing of repeat isolates may be  warranted.     • Significant Indicator 05/21/2020 .    • Source 05/21/2020 WND    • Site 05/21/2020 Right Leg    • Gram Stain Result 05/21/2020 No organisms seen.       Lab Results   Component Value Date/Time    HBA1C 5.0 02/16/2019 05:52 AM    HBA1C 5.9 (H) 12/12/2018 06:00 AM     Lab Results   Component Value Date/Time    SODIUM 138 07/26/2021 11:14 AM    POTASSIUM 3.9 07/26/2021 11:14 AM    CHLORIDE 106 07/26/2021 11:14 AM    CO2 25 07/26/2021 11:14 AM    GLUCOSE 96 07/26/2021 11:14 AM    BUN 14 07/26/2021 11:14 AM    CREATININE 0.58 07/26/2021 11:14 AM    CREATININE 0.88 08/14/2010 12:00 AM    BUNCREATRAT 13 08/14/2010 12:00 AM    GLOMRATE >59 08/14/2010 12:00 AM    ALKPHOSPHAT 105 (H) 07/26/2021 11:14 AM    ASTSGOT 27 07/26/2021 11:14 AM    ALTSGPT 22 07/26/2021 11:14 AM    TBILIRUBIN 0.5 07/26/2021 11:14 AM     Lab Results   Component Value Date/Time    INR 1.00 02/13/2019 11:44 AM    INR 1.01 12/04/2018 11:15 AM    INR 1.04 05/18/2012 12:20 PM     Lab Results    Component Value Date/Time    CHOLSTRLTOT 147 07/26/2021 11:14 AM    LDL 63 07/26/2021 11:14 AM    HDL 75 07/26/2021 11:14 AM    TRIGLYCERIDE 44 07/26/2021 11:14 AM       No results found for: TESTOSTERONE  Lab Results   Component Value Date/Time    TSH 2.110 08/14/2010 12:00 AM     Lab Results   Component Value Date/Time    FREET4 0.72 05/03/2016 07:32 AM     Lab Results   Component Value Date/Time    URICACID 5.6 08/12/2014 04:49 PM     No components found for: VITB12  Lab Results   Component Value Date/Time    25HYDROXY 17 (L) 02/15/2019 03:30 AM    25HYDROXY 14 (L) 12/12/2018 06:00 AM               HPI    Review of Systems   Constitutional: Negative.    HENT: Negative.    Eyes: Negative.    Respiratory: Negative.    Cardiovascular: Negative.    Gastrointestinal: Negative.    Genitourinary: Negative.    Musculoskeletal: Negative.    Skin: Negative.    Neurological: Negative.    Endo/Heme/Allergies: Negative.    Psychiatric/Behavioral: Negative.           Objective:     /88 (BP Location: Left arm, Patient Position: Sitting, BP Cuff Size: Large adult)   Pulse 92   Temp 37.3 °C (99.1 °F)   Ht 1.524 m (5')   Wt 98.9 kg (218 lb)   LMP  (LMP Unknown)   SpO2 93%   BMI 42.58 kg/m²      Physical Exam  Vitals reviewed.   Constitutional:       General: She is not in acute distress.     Appearance: She is well-developed. She is not diaphoretic.   HENT:      Head: Normocephalic and atraumatic.      Right Ear: External ear normal.      Left Ear: External ear normal.      Nose: Nose normal.      Mouth/Throat:      Pharynx: No oropharyngeal exudate.   Eyes:      General: No scleral icterus.        Right eye: No discharge.         Left eye: No discharge.      Conjunctiva/sclera: Conjunctivae normal.      Pupils: Pupils are equal, round, and reactive to light.   Neck:      Thyroid: No thyromegaly.      Vascular: No JVD.      Trachea: No tracheal deviation.   Cardiovascular:      Rate and Rhythm: Normal rate and  regular rhythm.      Heart sounds: Normal heart sounds. No murmur heard.   No friction rub. No gallop.    Pulmonary:      Effort: Pulmonary effort is normal. No respiratory distress.      Breath sounds: Normal breath sounds. No stridor. No wheezing or rales.   Chest:      Chest wall: No tenderness.   Abdominal:      General: Bowel sounds are normal. There is no distension.      Palpations: Abdomen is soft. There is no mass.      Tenderness: There is no abdominal tenderness. There is no guarding or rebound.   Musculoskeletal:         General: No tenderness. Normal range of motion.      Cervical back: Normal range of motion and neck supple.   Lymphadenopathy:      Cervical: No cervical adenopathy.   Skin:     General: Skin is warm and dry.      Coloration: Skin is not pale.      Findings: No erythema or rash.   Neurological:      Mental Status: She is alert and oriented to person, place, and time.      Cranial Nerves: No cranial nerve deficit.      Motor: No abnormal muscle tone.      Coordination: Coordination normal.      Deep Tendon Reflexes: Reflexes are normal and symmetric. Reflexes normal.   Psychiatric:         Behavior: Behavior normal.         Thought Content: Thought content normal.         Judgment: Judgment normal.                   No visits with results within 1 Month(s) from this visit.   Latest known visit with results is:   Hospital Outpatient Visit on 05/21/2020   Component Date Value   • Significant Indicator 05/21/2020 POS*   • Source 05/21/2020 WND    • Site 05/21/2020 Right Leg    • Culture Result 05/21/2020 -*   • Gram Stain Result 05/21/2020 No organisms seen.    • Culture Result 05/21/2020 *                    Value:Pseudomonas aeruginosa  Moderate growth  P.aeruginosa may develop resistance during prolonged therapy  with all antibiotics. Isolates that are initially susceptible  may become resistant within three to four days after  initiation of therapy. Testing of repeat isolates may  be  warranted.     • Significant Indicator 05/21/2020 .    • Source 05/21/2020 WND    • Site 05/21/2020 Right Leg    • Gram Stain Result 05/21/2020 No organisms seen.       Lab Results   Component Value Date/Time    HBA1C 5.0 02/16/2019 05:52 AM    HBA1C 5.9 (H) 12/12/2018 06:00 AM     Lab Results   Component Value Date/Time    SODIUM 138 07/26/2021 11:14 AM    POTASSIUM 3.9 07/26/2021 11:14 AM    CHLORIDE 106 07/26/2021 11:14 AM    CO2 25 07/26/2021 11:14 AM    GLUCOSE 96 07/26/2021 11:14 AM    BUN 14 07/26/2021 11:14 AM    CREATININE 0.58 07/26/2021 11:14 AM    CREATININE 0.88 08/14/2010 12:00 AM    BUNCREATRAT 13 08/14/2010 12:00 AM    GLOMRATE >59 08/14/2010 12:00 AM    ALKPHOSPHAT 105 (H) 07/26/2021 11:14 AM    ASTSGOT 27 07/26/2021 11:14 AM    ALTSGPT 22 07/26/2021 11:14 AM    TBILIRUBIN 0.5 07/26/2021 11:14 AM     Lab Results   Component Value Date/Time    INR 1.00 02/13/2019 11:44 AM    INR 1.01 12/04/2018 11:15 AM    INR 1.04 05/18/2012 12:20 PM     Lab Results   Component Value Date/Time    CHOLSTRLTOT 147 07/26/2021 11:14 AM    LDL 63 07/26/2021 11:14 AM    HDL 75 07/26/2021 11:14 AM    TRIGLYCERIDE 44 07/26/2021 11:14 AM       No results found for: TESTOSTERONE  Lab Results   Component Value Date/Time    TSH 2.110 08/14/2010 12:00 AM     Lab Results   Component Value Date/Time    FREET4 0.72 05/03/2016 07:32 AM     Lab Results   Component Value Date/Time    URICACID 5.6 08/12/2014 04:49 PM     No components found for: VITB12  Lab Results   Component Value Date/Time    25HYDROXY 17 (L) 02/15/2019 03:30 AM    25HYDROXY 14 (L) 12/12/2018 06:00 AM        Assessment/Plan:        1. Encounter for screening mammogram for breast cancer     - MA-SCREENING MAMMO BILAT W/CAD; Future    2. Screen for colon cancer     - REFERRAL TO GI FOR COLONOSCOPY    3. COPD with asthma (HCC)   Under good control. Continue same regimen.    - REFERRAL TO PULMONARY AND SLEEP MEDICINE    4. DDD (degenerative disc disease), lumbar    npt at goal - resume :  - methocarbamol (ROBAXIN) 500 MG Tab; Take 2 Tablets by mouth 4 times a day.  Dispense: 120 tablet; Refill: 0    5. DDD (degenerative disc disease), cervical- MOD-SEVERE SPINE NV- dr brennan; fusion and laminectomy C3-T1 12/5/2018 dr brennan   above  - methocarbamol (ROBAXIN) 500 MG Tab; Take 2 Tablets by mouth 4 times a day.  Dispense: 120 tablet; Refill: 0    6. Obesity (BMI 30-39.9)   not at goal- rx:  - phentermine 37.5 MG capsule; Take 1 capsule by mouth every morning for 90 days.  Dispense: 90 capsule; Refill: 1   diet/exercise/lose 15 lbs.; patient counseled    7. Vitamin D deficiency   Under good control. Continue same regimen.  - VITAMIN D,25 HYDROXY; Future    8. Essential hypertension    Under good control. Continue same regimen.    9. Mild intermittent asthma with acute exacerbation    Under good control. Continue same regimen.  10. Uncomplicated opioid dependence (CMS-HCC)- spine nv     Under good control. Continue same regimen.  11. Chronic pain syndrome- nv adv pain, dr sanders      Under good control. Continue same regimen.  12. Healthcare maintenance    Under good control. Continue same regimen.  - TSH; Future  - Comp Metabolic Panel; Future  - Lipid Profile; Future  - CBC WITH DIFFERENTIAL; Future    13. IFG (impaired fasting glucose)     Under good control. Continue same regimen.- HEMOGLOBIN A1C; Future    14. Dyslipidemia Under good control. Continue same regimen.     Under good control. Continue same regimen.- TSH; Future    15. Other iron deficiency anemia   resolved- d/t surgery  - IRON/TOTAL IRON BIND; Future  - FERRITIN; Future

## 2021-07-26 NOTE — LETTER
July 26, 2021        Karine Ovalle  [address]  [address]  [facility] [address]        Dear Karine and to whom it may concern:     This  Patient is under  My care and her exposure to smoke from neighbor in her apartment complex is adversely her health and lungs since she has COPD and asthma    If you have any questions or concerns, please don't hesitate to call.        Sincerely,        Micky Rubin M.D.    Electronically Signed

## 2021-08-09 ENCOUNTER — OFFICE VISIT (OUTPATIENT)
Dept: MEDICAL GROUP | Age: 56
End: 2021-08-09

## 2021-08-09 VITALS
TEMPERATURE: 98.4 F | WEIGHT: 227 LBS | OXYGEN SATURATION: 93 % | HEART RATE: 91 BPM | DIASTOLIC BLOOD PRESSURE: 80 MMHG | HEIGHT: 60 IN | BODY MASS INDEX: 44.57 KG/M2 | SYSTOLIC BLOOD PRESSURE: 130 MMHG

## 2021-08-09 DIAGNOSIS — I10 ESSENTIAL HYPERTENSION: ICD-10-CM

## 2021-08-09 DIAGNOSIS — I83.891 VARICOSE VEINS OF RIGHT LEG WITH EDEMA: ICD-10-CM

## 2021-08-09 DIAGNOSIS — E55.9 VITAMIN D DEFICIENCY: ICD-10-CM

## 2021-08-09 DIAGNOSIS — M17.0 PRIMARY OSTEOARTHRITIS OF BOTH KNEES: ICD-10-CM

## 2021-08-09 DIAGNOSIS — E53.8 VITAMIN B 12 DEFICIENCY: ICD-10-CM

## 2021-08-09 DIAGNOSIS — Z02.9 ADMINISTRATIVE ENCOUNTER: ICD-10-CM

## 2021-08-09 PROCEDURE — 99214 OFFICE O/P EST MOD 30 MIN: CPT | Performed by: INTERNAL MEDICINE

## 2021-08-09 ASSESSMENT — ENCOUNTER SYMPTOMS
GASTROINTESTINAL NEGATIVE: 1
PSYCHIATRIC NEGATIVE: 1
EYES NEGATIVE: 1
RESPIRATORY NEGATIVE: 1
CONSTITUTIONAL NEGATIVE: 1
NEUROLOGICAL NEGATIVE: 1
MUSCULOSKELETAL NEGATIVE: 1

## 2021-08-09 ASSESSMENT — FIBROSIS 4 INDEX: FIB4 SCORE: 1.439104835400370432

## 2021-08-09 NOTE — Clinical Note
Please advise it will need to redo patient's FMLA current paperwork to allow her to extra flareups of 10 hours/day for 6 months.  To accommodate recurrent swelling in her right leg.  She will be back in 2 weeks to modify amend or make any changes she may wish to do.  I explained that the paperwork will not be done until you get back next week.

## 2021-08-09 NOTE — PROGRESS NOTES
Subjective:      Karine Ovalle is a 56 y.o. female who presents with Lab Results and Follow-Up (2 week FV )  The patient is here for follow-up and review of her recent lab work all of which looks very good for her.  She is doing relatively well except for a flareup of swelling in her right lower leg.  She is status post right total knee replacement few years ago.  There has been some mild discomfort in the right lower leg.  She has been trying to elevate it and keep moist heat on it.  Swelling is occurred over the last few weeks.  No recent trauma.  Is been no chest pain or dyspnea.    She wants more time off to accommodate this and for future flareups and tells me that she needs to redo her FMLA paperwork to allow her to have an extra 1 day (10 hours) off per week up to 2 episodes over the next 6 months, or as further advised by her or her employer so that she still flies for full-time employment.    I explained that our office RN is out for the weekend and will be able to redo the FMLA paperwork in accordance with patient's needs, desires, and workplace accommodations off for FMLA.          HPI    Review of Systems   Constitutional: Negative.    HENT: Negative.    Eyes: Negative.    Respiratory: Negative.    Cardiovascular: Positive for leg swelling.   Gastrointestinal: Negative.    Genitourinary: Negative.    Musculoskeletal: Negative.    Skin: Negative.    Neurological: Negative.    Endo/Heme/Allergies: Negative.    Psychiatric/Behavioral: Negative.           Objective:     /80 (BP Location: Left arm, Patient Position: Sitting, BP Cuff Size: Adult)   Pulse 91   Temp 36.9 °C (98.4 °F) (Temporal)   Ht 1.524 m (5')   Wt 103 kg (227 lb)   LMP  (LMP Unknown)   SpO2 93%   BMI 44.33 kg/m²      Physical Exam  Vitals reviewed.   Constitutional:       General: She is not in acute distress.     Appearance: She is well-developed. She is not diaphoretic.   HENT:      Head: Normocephalic and atraumatic.       Right Ear: External ear normal.      Left Ear: External ear normal.      Nose: Nose normal.      Mouth/Throat:      Pharynx: No oropharyngeal exudate.   Eyes:      General: No scleral icterus.        Right eye: No discharge.         Left eye: No discharge.      Conjunctiva/sclera: Conjunctivae normal.      Pupils: Pupils are equal, round, and reactive to light.   Neck:      Thyroid: No thyromegaly.      Vascular: No JVD.      Trachea: No tracheal deviation.   Cardiovascular:      Rate and Rhythm: Normal rate and regular rhythm.      Heart sounds: Normal heart sounds. No murmur heard.   No friction rub. No gallop.    Pulmonary:      Effort: Pulmonary effort is normal. No respiratory distress.      Breath sounds: Normal breath sounds. No stridor. No wheezing or rales.   Chest:      Chest wall: No tenderness.   Abdominal:      General: Bowel sounds are normal. There is no distension.      Palpations: Abdomen is soft. There is no mass.      Tenderness: There is no abdominal tenderness. There is no guarding or rebound.   Musculoskeletal:         General: Swelling and tenderness present. Normal range of motion.      Cervical back: Normal range of motion and neck supple.      Comments: There is diffuse 2-3+ edema the right lower leg with some mild calf tenderness.   Lymphadenopathy:      Cervical: No cervical adenopathy.   Skin:     General: Skin is warm and dry.      Coloration: Skin is not pale.      Findings: No erythema or rash.   Neurological:      Mental Status: She is alert and oriented to person, place, and time.      Cranial Nerves: No cranial nerve deficit.      Motor: No abnormal muscle tone.      Coordination: Coordination normal.      Deep Tendon Reflexes: Reflexes are normal and symmetric. Reflexes normal.   Psychiatric:         Behavior: Behavior normal.         Thought Content: Thought content normal.         Judgment: Judgment normal.       Hospital Outpatient Visit on 07/26/2021   Component Date Value    • Ferritin 07/26/2021 124.0    • Iron 07/26/2021 126    • Total Iron Binding 07/26/2021 289    • Unsat Iron Binding 07/26/2021 163    • % Saturation 07/26/2021 44    • Glycohemoglobin 07/26/2021 5.1    • Est Avg Glucose 07/26/2021 100    • 25-Hydroxy   Vitamin D 25 07/26/2021 50    • WBC 07/26/2021 8.4    • RBC 07/26/2021 4.30    • Hemoglobin 07/26/2021 12.9    • Hematocrit 07/26/2021 37.9    • MCV 07/26/2021 88.1    • MCH 07/26/2021 30.0    • MCHC 07/26/2021 34.0    • RDW 07/26/2021 44.8    • Platelet Count 07/26/2021 224    • MPV 07/26/2021 9.5    • Neutrophils-Polys 07/26/2021 58.50    • Lymphocytes 07/26/2021 30.20    • Monocytes 07/26/2021 6.30    • Eosinophils 07/26/2021 4.10    • Basophils 07/26/2021 0.50    • Immature Granulocytes 07/26/2021 0.40    • Nucleated RBC 07/26/2021 0.00    • Neutrophils (Absolute) 07/26/2021 4.92    • Lymphs (Absolute) 07/26/2021 2.53    • Monos (Absolute) 07/26/2021 0.53    • Eos (Absolute) 07/26/2021 0.34    • Baso (Absolute) 07/26/2021 0.04    • Immature Granulocytes (a* 07/26/2021 0.03    • NRBC (Absolute) 07/26/2021 0.00    • Cholesterol,Tot 07/26/2021 147    • Triglycerides 07/26/2021 44    • HDL 07/26/2021 75    • LDL 07/26/2021 63    • Sodium 07/26/2021 138    • Potassium 07/26/2021 3.9    • Chloride 07/26/2021 106    • Co2 07/26/2021 25    • Anion Gap 07/26/2021 7.0    • Glucose 07/26/2021 96    • Bun 07/26/2021 14    • Creatinine 07/26/2021 0.58    • Calcium 07/26/2021 9.0    • AST(SGOT) 07/26/2021 27    • ALT(SGPT) 07/26/2021 22    • Alkaline Phosphatase 07/26/2021 105*   • Total Bilirubin 07/26/2021 0.5    • Albumin 07/26/2021 3.7    • Total Protein 07/26/2021 7.0    • Globulin 07/26/2021 3.3    • A-G Ratio 07/26/2021 1.1    • TSH 07/26/2021 1.380    • Fasting Status 07/26/2021 Fasting    • GFR If  07/26/2021 >60    • GFR If Non  [employer]* 07/26/2021 >60       Lab Results   Component Value Date/Time    HBA1C 5.1 07/26/2021 11:14 AM    HBA1C 5.0  02/16/2019 05:52 AM     Lab Results   Component Value Date/Time    SODIUM 138 07/26/2021 11:14 AM    POTASSIUM 3.9 07/26/2021 11:14 AM    CHLORIDE 106 07/26/2021 11:14 AM    CO2 25 07/26/2021 11:14 AM    GLUCOSE 96 07/26/2021 11:14 AM    BUN 14 07/26/2021 11:14 AM    CREATININE 0.58 07/26/2021 11:14 AM    CREATININE 0.88 08/14/2010 12:00 AM    BUNCREATRAT 13 08/14/2010 12:00 AM    GLOMRATE >59 08/14/2010 12:00 AM    ALKPHOSPHAT 105 (H) 07/26/2021 11:14 AM    ASTSGOT 27 07/26/2021 11:14 AM    ALTSGPT 22 07/26/2021 11:14 AM    TBILIRUBIN 0.5 07/26/2021 11:14 AM     Lab Results   Component Value Date/Time    INR 1.00 02/13/2019 11:44 AM    INR 1.01 12/04/2018 11:15 AM    INR 1.04 05/18/2012 12:20 PM     Lab Results   Component Value Date/Time    CHOLSTRLTOT 147 07/26/2021 11:14 AM    LDL 63 07/26/2021 11:14 AM    HDL 75 07/26/2021 11:14 AM    TRIGLYCERIDE 44 07/26/2021 11:14 AM       No results found for: TESTOSTERONE  Lab Results   Component Value Date/Time    TSH 2.110 08/14/2010 12:00 AM     Lab Results   Component Value Date/Time    FREET4 0.72 05/03/2016 07:32 AM     Lab Results   Component Value Date/Time    URICACID 5.6 08/12/2014 04:49 PM     No components found for: VITB12  Lab Results   Component Value Date/Time    25HYDROXY 50 07/26/2021 11:14 AM    25HYDROXY 17 (L) 02/15/2019 03:30 AM                       Assessment/Plan:        1. Administrative [id number] [facility] paperwork for workplace accommodation for chronic DDD and DJD hips and shoulders bilaterally status post multiple joint replacements  Patient requests renewal of her current [facility] paperwork to give her an extra 10 hours/week off up to 2 episodes per flareup over the next 6 months.    2. Essential hypertension  Good control continue current regimen    3. Vitamin D deficiency  Good control continue same regimen  4. Vitamin B 12 deficiency     Good control continue same regimen  5. Primary osteoarthritis of both knees- sp right TKR 2015; left  TKR tbd 2020- dr nowak  Stable at this time.    6. Varicose veins of right leg with edema     Worsening swelling in the right leg.  Rule out DVT.  This may be secondary to her chronic osteoarthritic changes status post right knee total replacement years ago.     Doppler ultrasound ordered.  Call results.  Recheck in 2 weeks.

## 2021-08-13 DIAGNOSIS — M50.30 DDD (DEGENERATIVE DISC DISEASE), CERVICAL: ICD-10-CM

## 2021-08-13 DIAGNOSIS — M51.36 DDD (DEGENERATIVE DISC DISEASE), LUMBAR: ICD-10-CM

## 2021-08-13 RX ORDER — METHOCARBAMOL 500 MG/1
TABLET, FILM COATED ORAL
Qty: 120 TABLET | Refills: 0 | Status: SHIPPED | OUTPATIENT
Start: 2021-08-13 | End: 2021-12-30

## 2021-08-16 ENCOUNTER — HOSPITAL ENCOUNTER (OUTPATIENT)
Dept: RADIOLOGY | Facility: MEDICAL CENTER | Age: 56
End: 2021-08-16
Attending: INTERNAL MEDICINE
Payer: COMMERCIAL

## 2021-08-16 DIAGNOSIS — Z12.31 ENCOUNTER FOR SCREENING MAMMOGRAM FOR MALIGNANT NEOPLASM OF BREAST: ICD-10-CM

## 2021-08-16 PROCEDURE — 77063 BREAST TOMOSYNTHESIS BI: CPT

## 2021-08-17 ENCOUNTER — OFFICE VISIT (OUTPATIENT)
Dept: VASCULAR LAB | Facility: MEDICAL CENTER | Age: 56
End: 2021-08-17
Attending: INTERNAL MEDICINE
Payer: COMMERCIAL

## 2021-08-17 DIAGNOSIS — I89.0 LYMPHEDEMA: ICD-10-CM

## 2021-08-17 DIAGNOSIS — I87.2 VENOUS INSUFFICIENCY: ICD-10-CM

## 2021-08-17 PROCEDURE — 99214 OFFICE O/P EST MOD 30 MIN: CPT | Performed by: INTERNAL MEDICINE

## 2021-08-17 NOTE — PROGRESS NOTES
Follow Up VASCULAR VISIT  Subjective:   Karine Ovalle is a 56 y.o. female who presents today 8-17-21 for vascular follow-up    This visit was conducted via Zoom video call using secure and encrypted video conferencing technology due to covid restrictions.   The patient was in a private location in the [facility].    The patient's identity was confirmed and verbal consent was obtained for this virtual visit.    HPI:    Patient here for follow-up of lymphedema and chronic venous insufficiency  We spent quite a bit of my time working out the video connection trying to get the most recent history of her vein disease straight.  Is been was 4 years since she has been seen here.  In the meantime she has been treated by physical therapy at [facility].  She uses bilateral leg pumps every day.  She also had a venous procedure done on both legs at [facility] [place].  She is gone back to [facility] [place] and they told her there is nothing more that they can do about her chronic leg swelling.  She finds that her swelling is worse on the right than on the left.  She did have hip surgery about 2 years ago.  She has never had a blood clot.  She is not on blood thinners.  She is scheduled for right lower extremity venous duplex.  She has no skin breakdown.  She has no difficulty breathing and no known heart failure has been diagnosed since we last spoke.    Social History     Tobacco Use   Smoking Status Never Smoker   Smokeless Tobacco Never Used     Social History     Tobacco Use   • Smoking status: Never Smoker   • Smokeless tobacco: Never Used   Vaping Use   • Vaping Use: Never used   Substance Use Topics   • Alcohol use: Not Currently     Comment: 3-4 per week; pt stops alcohol use 1-week ago   • Drug use: No     Frequency: 4.0 times per week     DIET AND EXERCISE:  Weight Change: none  Diet: common adult  Exercise: no regular exercise program        Objective:     There were no vitals filed for this visit.   There is no  height or weight on file to calculate BMI.  Physical Exam  Constitutional:       Appearance: Normal appearance. She is well-developed. She is not toxic-appearing or diaphoretic.   HENT:      Head: Normocephalic and atraumatic.   Eyes:      General: No scleral icterus.     Extraocular Movements: Extraocular movements intact.      Conjunctiva/sclera: Conjunctivae normal.   Neck:      Thyroid: No thyromegaly.      Vascular: No JVD.   Pulmonary:      Effort: Pulmonary effort is normal. No respiratory distress.   Musculoskeletal:      Comments: Severe coretta leg swelling & lymphedema     Skin:     Coloration: Skin is not pale.   Neurological:      General: No focal deficit present.      Mental Status: She is alert and oriented to person, place, and time.      Cranial Nerves: No cranial nerve deficit.      Coordination: Coordination normal.   Psychiatric:         Mood and Affect: Mood normal.         Behavior: Behavior normal.       Lab Results   Component Value Date    CHOLSTRLTOT 147 07/26/2021    LDL 63 07/26/2021    HDL 75 07/26/2021    TRIGLYCERIDE 44 07/26/2021         Lab Results   Component Value Date    HBA1C 5.1 07/26/2021      Lab Results   Component Value Date    SODIUM 138 07/26/2021    POTASSIUM 3.9 07/26/2021    CHLORIDE 106 07/26/2021    CO2 25 07/26/2021    GLUCOSE 96 07/26/2021    BUN 14 07/26/2021    CREATININE 0.58 07/26/2021    IFAFRICA >60 07/26/2021    IFNOTAFR >60 07/26/2021        Lab Results   Component Value Date    WBC 8.4 07/26/2021    RBC 4.30 07/26/2021    HEMOGLOBIN 12.9 07/26/2021    HEMATOCRIT 37.9 07/26/2021    MCV 88.1 07/26/2021    MCH 30.0 07/26/2021    MCHC 34.0 07/26/2021    MPV 9.5 07/26/2021      Multiple imaging studies available in EMR and reviewed with patient at todays visit     Medical Decision Making:  Today's Assessment / Status / Plan:     1. Venous insufficiency     2. Lymphedema       1. Chronic Venous Insufficiency/Lymphedema -longstanding issue.  No known history of  thrombotic disease.  Definitely does have history of venous reflux and she suspects that perhaps some of her worsening lymphedema in the right leg may be postsurgical.  She has had a venous intervention in the preceding few years bilaterally without much more improvement.  Actually sounds like her symptoms are under pretty decent control using lymphedema pumps.  Unclear  volume status based upon the limitations of video visit  -Await right lower extremity venous duplex to rule out DVT  -Continue lymphedema pumps  -Continue as much walking as possible  -Continue elevation  -Continue current dose of loop diuretic pending further evaluation  -May need echocardiogram and BNP  -We will get records from [facility] to review  -Will likely need a repeat complete reflux study  -Continue to follow-up with the lymphedema program at [facility]    Instructed to follow-up with PCP for remainder of adult medical needs: yes  We will partner with other providers in the management of established vascular disease and cardiometabolic risk factors.    Studies to Be Obtained: Await right extremity venous duplex already scheduled  Labs to Be Obtained: None    Follow up in: 3 weeks after ultrasound    Time: 30-39min - chart review/prep, review of other providers' records, imaging/lab review, face-to-face time for history/examination, ordering, prescribing,  review of results/meds/ treatment plan with patient/family/caregiver, documentation in EMR, care coordination (as needed)    Michael J Bloch, M.D.     Cc:   JENNIFER Rubin  [facility]

## 2021-08-23 ENCOUNTER — HOSPITAL ENCOUNTER (OUTPATIENT)
Dept: RADIOLOGY | Facility: MEDICAL CENTER | Age: 56
End: 2021-08-23
Attending: INTERNAL MEDICINE
Payer: COMMERCIAL

## 2021-08-23 DIAGNOSIS — I83.891 VARICOSE VEINS OF RIGHT LEG WITH EDEMA: ICD-10-CM

## 2021-08-23 PROCEDURE — 93971 EXTREMITY STUDY: CPT | Mod: 26,RT | Performed by: INTERNAL MEDICINE

## 2021-08-23 PROCEDURE — 93971 EXTREMITY STUDY: CPT | Mod: RT

## 2021-08-27 ENCOUNTER — PATIENT MESSAGE (OUTPATIENT)
Dept: MEDICAL GROUP | Age: 56
End: 2021-08-27

## 2021-08-27 DIAGNOSIS — M50.30 DDD (DEGENERATIVE DISC DISEASE), CERVICAL: ICD-10-CM

## 2021-08-27 DIAGNOSIS — G89.29 CHRONIC BILATERAL LOW BACK PAIN WITH BILATERAL SCIATICA: ICD-10-CM

## 2021-08-27 DIAGNOSIS — M51.36 DDD (DEGENERATIVE DISC DISEASE), LUMBAR: ICD-10-CM

## 2021-08-27 DIAGNOSIS — M54.41 CHRONIC BILATERAL LOW BACK PAIN WITH BILATERAL SCIATICA: ICD-10-CM

## 2021-08-27 DIAGNOSIS — M54.42 CHRONIC BILATERAL LOW BACK PAIN WITH BILATERAL SCIATICA: ICD-10-CM

## 2021-08-27 NOTE — PATIENT COMMUNICATION
Received request via: Patient    Was the patient seen in the last year in this department? Yes    Does the patient have an active prescription (recently filled or refills available) for medication(s) requested? Yes, patient insurance needs a script for 256 instead of the 120     Patient message:  Please  ,  Could you please authorize my recent request for the muscle relaxer...Mataxolot. You use to prescribe me in the amount of 256. But since your last FMLA, some of the Dr's reduced the amount. It cost me more money via my health insurance with the [facility].  At current you are prescribing me #120 per bottle. You used prescribe  me #256 per bottle. I'm in need of this medication  due to my post spinal cord neck surgery. My neck is really tightly up and it also relieves my arthritis.  [facility] pharmacy has also faxed to you my request to refill as of today. I really need ASAP for my spinal cord area and fingers all caused via the spinal cord surgery.       Important Request!  8/27/2021 2:33 PM                  [facility] [place]

## 2021-08-30 ENCOUNTER — DOCUMENTATION (OUTPATIENT)
Dept: VASCULAR LAB | Facility: MEDICAL CENTER | Age: 56
End: 2021-08-30

## 2021-08-30 RX ORDER — METAXALONE 800 MG/1
TABLET ORAL
Qty: 90 TABLET | Refills: 0 | Status: SHIPPED | OUTPATIENT
Start: 2021-08-30 | End: 2021-12-30

## 2021-08-30 RX ORDER — METHOCARBAMOL 500 MG/1
1000 TABLET, FILM COATED ORAL 4 TIMES DAILY
Qty: 236 TABLET | Refills: 0 | Status: SHIPPED | OUTPATIENT
Start: 2021-08-30 | End: 2021-10-05

## 2021-08-31 NOTE — PROGRESS NOTES
Per our MA, patient called without rectal after 5 PM.  Is our staff in order to be here I asked that she be rescheduled.    Michael Bloch, MD  Vascular Medicine

## 2021-09-20 ENCOUNTER — APPOINTMENT (OUTPATIENT)
Dept: VASCULAR LAB | Facility: MEDICAL CENTER | Age: 56
End: 2021-09-20
Payer: COMMERCIAL

## 2021-09-29 ENCOUNTER — PATIENT MESSAGE (OUTPATIENT)
Dept: MEDICAL GROUP | Age: 56
End: 2021-09-29

## 2021-09-29 DIAGNOSIS — E66.9 OBESITY (BMI 30-39.9): ICD-10-CM

## 2021-09-29 NOTE — LETTER
September 30, 2021        Karine Fox Vasquez        To Whom it May Concern:     Karine has been under my care. She is unable to sit or stand for prolonged periods of time. She is unable to participate in jury duty due to her chronic medical conditions, please excuse her.               Please let my office know if you have any questions.             Thank you,             Micky Rubin MD

## 2021-10-04 DIAGNOSIS — E87.6 HYPOKALEMIA: ICD-10-CM

## 2021-10-04 DIAGNOSIS — M51.36 DDD (DEGENERATIVE DISC DISEASE), LUMBAR: ICD-10-CM

## 2021-10-04 DIAGNOSIS — M50.30 DDD (DEGENERATIVE DISC DISEASE), CERVICAL: ICD-10-CM

## 2021-10-05 RX ORDER — METHOCARBAMOL 500 MG/1
TABLET, FILM COATED ORAL
Qty: 236 TABLET | Refills: 3 | Status: SHIPPED | OUTPATIENT
Start: 2021-10-05 | End: 2021-11-30

## 2021-10-05 RX ORDER — POTASSIUM CHLORIDE 20 MEQ/1
20 TABLET, EXTENDED RELEASE ORAL DAILY
Qty: 90 TABLET | Refills: 3 | Status: SHIPPED | OUTPATIENT
Start: 2021-10-05 | End: 2021-12-31

## 2021-10-27 RX ORDER — PHENTERMINE HYDROCHLORIDE 37.5 MG/1
37.5 CAPSULE ORAL EVERY MORNING
Qty: 30 CAPSULE | Refills: 0 | Status: SHIPPED | OUTPATIENT
Start: 2021-10-27 | End: 2021-11-30 | Stop reason: SDUPTHER

## 2021-11-08 ENCOUNTER — DOCUMENTATION (OUTPATIENT)
Dept: VASCULAR LAB | Facility: MEDICAL CENTER | Age: 56
End: 2021-11-08

## 2021-11-08 ENCOUNTER — TELEPHONE (OUTPATIENT)
Dept: VASCULAR LAB | Facility: MEDICAL CENTER | Age: 56
End: 2021-11-08

## 2021-11-08 NOTE — PROGRESS NOTES
Pt was a no show for her/his vascular visit today. Tasked for MA to call and reschedule. ЮЛИЯ Martin.

## 2021-11-09 NOTE — TELEPHONE ENCOUNTER
Called pt to reschedule appt. She was unable to schedule in that moment but said she would call back to reschedule.   ----- Message from JUNE Martin sent at 11/8/2021  9:22 AM PST -----  Regarding: NO SHOW  Pt was a no show for her/his vascular visit today. Please try to call and reschedule. Thanks, JUNE Martin

## 2021-11-30 ENCOUNTER — OFFICE VISIT (OUTPATIENT)
Dept: MEDICAL GROUP | Age: 56
End: 2021-11-30
Payer: COMMERCIAL

## 2021-11-30 VITALS
WEIGHT: 227.8 LBS | RESPIRATION RATE: 18 BRPM | HEIGHT: 60 IN | DIASTOLIC BLOOD PRESSURE: 98 MMHG | OXYGEN SATURATION: 96 % | HEART RATE: 95 BPM | SYSTOLIC BLOOD PRESSURE: 140 MMHG | BODY MASS INDEX: 44.72 KG/M2 | TEMPERATURE: 97.4 F

## 2021-11-30 DIAGNOSIS — E66.9 OBESITY (BMI 30-39.9): ICD-10-CM

## 2021-11-30 DIAGNOSIS — N39.3 SUI (STRESS URINARY INCONTINENCE, FEMALE): ICD-10-CM

## 2021-11-30 DIAGNOSIS — E66.01 MORBID OBESITY WITH BMI OF 40.0-44.9, ADULT (HCC): ICD-10-CM

## 2021-11-30 PROCEDURE — 99214 OFFICE O/P EST MOD 30 MIN: CPT | Performed by: INTERNAL MEDICINE

## 2021-11-30 RX ORDER — PHENTERMINE HYDROCHLORIDE 37.5 MG/1
37.5 CAPSULE ORAL EVERY MORNING
Qty: 90 CAPSULE | Refills: 1 | Status: SHIPPED | OUTPATIENT
Start: 2021-11-30 | End: 2022-02-28

## 2021-11-30 ASSESSMENT — FIBROSIS 4 INDEX: FIB4 SCORE: 1.439104835400370432

## 2021-11-30 ASSESSMENT — ENCOUNTER SYMPTOMS
RESPIRATORY NEGATIVE: 1
CONSTITUTIONAL NEGATIVE: 1
CARDIOVASCULAR NEGATIVE: 1
EYES NEGATIVE: 1
MUSCULOSKELETAL NEGATIVE: 1
PSYCHIATRIC NEGATIVE: 1
GASTROINTESTINAL NEGATIVE: 1
NEUROLOGICAL NEGATIVE: 1

## 2021-11-30 NOTE — PROGRESS NOTES
Subjective     Karine Ovalle is a 56 y.o. female who presents with Follow-Up (weight loss management) and Enuresis (referral to uro/gyn)  The patient is here for followup of chronic medical problems listed below. The patient is compliant with medications and having no side effects from them. Denies chest pain, abdominal pain, dyspnea, myalgias, or cough.   .pro  Patient Active Problem List    Diagnosis Date Noted   • Varicose veins of right leg with edema 08/09/2021   • Administrative [id number] [place] paperwork for workplace accommodation for chronic DDD and DJD hips and shoulders bilaterally status post multiple joint replacements 06/23/2021   • Primary osteoarthritis of both knees- sp right TKR 2015; left TKR tbd 2021 or 2022- dr nowak 06/04/2019   • S/P hip replacement, bilateral 02/13/2019   • Primary insomnia 01/03/2019   • Mixed stress and urge urinary incontinence 01/03/2019   • Edema 12/13/2018   • Vitamin D deficiency 12/13/2018   • Microcytic anemia- postop THR 2/2019 12/13/2018   • Cervical myelopathy (HCC) 12/11/2018   • Left hip pain 09/18/2018   • DDD (degenerative disc disease), cervical- MOD-SEVERE SPINE NV- dr brennan; fusion and laminectomy C3-T1 12/5/2018 dr brennan 08/09/2018   • Primary osteoarthritis of both hips- dr nowak; left THR done feb 6, 2019; right THR 3/2018 06/07/2018   • Morbid obesity with BMI of 40.0-44.9, adult ([facility]) 06/07/2018   • DDD (degenerative disc disease), lumbar 04/25/2018   • Uncomplicated opioid dependence (CMS-HCC)- spine nv 01/31/2018   • Essential hypertension 01/31/2018   • Lymphedema 10/17/2017   • Venous insufficiency 10/03/2017   • Chronic bilateral low back pain with bilateral sciatica- pain mgt;    spine nv 07/26/2017   • Chronic pain syndrome- nv adv pain, dr sanders 06/08/2017   • Vitamin B 12 deficiency 06/02/2016   • History of gastric bypass 04/25/2012   • Mild intermittent asthma with acute exacerbation 04/25/2012     ,  Allergies   Allergen  Reactions   • Tobacco [Nicotiana Tabacum] Shortness of Breath     Cigarette smoke causes SOB, rispatory issues     Outpatient Medications Prior to Visit   Medication Sig Dispense Refill   • potassium chloride SA (KDUR) 20 MEQ Tab CR Take 1 Tablet by mouth every day. 90 Tablet 3   • metaxalone (SKELAXIN) 800 MG Tab TAKE 1 TABLET BY MOUTH THREE TIMES DAILY 90 Tablet 0   • methocarbamol (ROBAXIN) 500 MG Tab TAKE 2 TABLETS BY MOUTH 4 TIMES DAILY 120 Tablet 0   • Misc. Devices Misc NPPB machine for 4 times daily   NPPB treatments with generic DuoNeb 1 Each 1   • fluticasone (FLOVENT HFA) 44 MCG/ACT Aerosol Inhale 2 Puffs 2 times a day. Everyday maintenance steroid inhaler 1 Each 11   • ipratropium-albuterol (DUONEB) 0.5-2.5 (3) MG/3ML nebulizer solution Take 3 mL by nebulization every 6 hours as needed for Shortness of Breath. 120 Each 11   • Misc. Devices Misc NPPB machine for mild intermittent asthma with acute exacerbation to be used every 4 hours as needed acute wheezing with DuoNeb solution as provided by pharmacy, and machine provided by [place] 1 Each 1   • Albuterol Sulfate (PROAIR RESPICLICK) 108 (90 Base) MCG/ACT AEROSOL POWDER, BREATH ACTIVATED Inhale 1 Puff every 6 hours as needed (Wheezing). 1 Each 5   • gabapentin (NEURONTIN) 600 MG tablet Take 1 tablet by mouth 4 times a day. 360 tablet 4   • morphine ER (MS CONTIN) 15 MG Tab CR tablet      • oxyCODONE-acetaminophen (PERCOCET-10)  MG Tab Take 1 Tab by mouth.  0   • furosemide (LASIX) 40 MG Tab Take 1 Tab by mouth every day. 90 Tab 4   • Calcium Carbonate-Vitamin D (CALCIUM 600 + D) 600-400 MG-UNIT Tab Take 1 Tab by mouth 3 times a day.     • Omega-3 Fatty Acids (FISH OIL) 1000 MG Cap capsule Take 1,000 mg by mouth every day.     • Multiple Vitamins-Minerals (ONE-A-DAY WOMENS 50 PLUS PO) Take 1 tablet by mouth every day.     • ferrous sulfate 325 (65 Fe) MG tablet Take 1 Tab by mouth every morning with breakfast. 30 Tab 1   •  cyanocobalamin (VITAMIN B12) 1000 MCG Tab Take 1 Tab by mouth every day. 90 Tab 4   • phentermine 37.5 MG capsule Take 1 Capsule by mouth every morning for 90 days. 30 Capsule 0   • methocarbamol (ROBAXIN) 500 MG Tab TAKE 2 TABLETS BY MOUTH 4 TIMES DAILY (Patient not taking: Reported on 11/30/2021) 236 Tablet 3     No facility-administered medications prior to visit.               HPI    Review of Systems   Constitutional: Negative.    HENT: Negative.    Eyes: Negative.    Respiratory: Negative.    Cardiovascular: Negative.    Gastrointestinal: Negative.    Genitourinary: Negative.    Musculoskeletal: Negative.    Skin: Negative.    Neurological: Negative.    Endo/Heme/Allergies: Negative.    Psychiatric/Behavioral: Negative.               Objective     /98 (BP Location: Left arm, Patient Position: Sitting, BP Cuff Size: Large adult long)   Pulse 95   Temp 36.3 °C (97.4 °F) (Temporal)   Resp 18   Ht 1.524 m (5')   Wt 103 kg (227 lb 12.8 oz)   LMP  (LMP Unknown)   SpO2 96%   BMI 44.49 kg/m²      Physical Exam  Vitals reviewed.   Constitutional:       General: She is not in acute distress.     Appearance: She is well-developed. She is not diaphoretic.   HENT:      Head: Normocephalic and atraumatic.      Right Ear: External ear normal.      Left Ear: External ear normal.      Nose: Nose normal.      Mouth/Throat:      Pharynx: No oropharyngeal exudate.   Eyes:      General: No scleral icterus.        Right eye: No discharge.         Left eye: No discharge.      Conjunctiva/sclera: Conjunctivae normal.      Pupils: Pupils are equal, round, and reactive to light.   Neck:      Thyroid: No thyromegaly.      Vascular: No JVD.      Trachea: No tracheal deviation.   Cardiovascular:      Rate and Rhythm: Normal rate and regular rhythm.      Heart sounds: Normal heart sounds. No murmur heard.  No friction rub. No gallop.    Pulmonary:      Effort: Pulmonary effort is normal. No respiratory distress.      Breath  sounds: Normal breath sounds. No stridor. No wheezing or rales.   Chest:      Chest wall: No tenderness.   Abdominal:      General: Bowel sounds are normal. There is no distension.      Palpations: Abdomen is soft. There is no mass.      Tenderness: There is no abdominal tenderness. There is no guarding or rebound.   Musculoskeletal:         General: No tenderness. Normal range of motion.      Cervical back: Normal range of motion and neck supple.   Lymphadenopathy:      Cervical: No cervical adenopathy.   Skin:     General: Skin is warm and dry.      Coloration: Skin is not pale.      Findings: No erythema or rash.   Neurological:      Mental Status: She is alert and oriented to person, place, and time.      Cranial Nerves: No cranial nerve deficit.      Motor: No abnormal muscle tone.      Coordination: Coordination normal.      Deep Tendon Reflexes: Reflexes are normal and symmetric. Reflexes normal.   Psychiatric:         Behavior: Behavior normal.         Thought Content: Thought content normal.         Judgment: Judgment normal.                             Assessment & Plan            2. DAKOTA (stress urinary incontinence, female)      - Referral to Urogynecology  - Referral to Physical Therapy    3. Morbid obesity with BMI of 40.0-44.9, adult ([facility])         - phentermine 37.5 MG capsule; Take 1 Capsule by mouth every morning for 90 days.  Dispense: 90 Capsule; Refill: 1

## 2021-12-23 DIAGNOSIS — M54.41 CHRONIC BILATERAL LOW BACK PAIN WITH BILATERAL SCIATICA: ICD-10-CM

## 2021-12-23 DIAGNOSIS — G89.29 CHRONIC BILATERAL LOW BACK PAIN WITH BILATERAL SCIATICA: ICD-10-CM

## 2021-12-23 DIAGNOSIS — M54.42 CHRONIC BILATERAL LOW BACK PAIN WITH BILATERAL SCIATICA: ICD-10-CM

## 2021-12-23 RX ORDER — GABAPENTIN 600 MG/1
TABLET ORAL
Qty: 360 TABLET | Refills: 0 | Status: SHIPPED | OUTPATIENT
Start: 2021-12-23 | End: 2022-04-25

## 2021-12-30 ENCOUNTER — HOSPITAL ENCOUNTER (OUTPATIENT)
Facility: MEDICAL CENTER | Age: 56
End: 2022-01-02
Attending: EMERGENCY MEDICINE | Admitting: STUDENT IN AN ORGANIZED HEALTH CARE EDUCATION/TRAINING PROGRAM
Payer: COMMERCIAL

## 2021-12-30 DIAGNOSIS — J45.20 MILD INTERMITTENT ASTHMA WITHOUT COMPLICATION: ICD-10-CM

## 2021-12-30 DIAGNOSIS — M54.41 CHRONIC BILATERAL LOW BACK PAIN WITH BILATERAL SCIATICA: ICD-10-CM

## 2021-12-30 DIAGNOSIS — L03.115 CELLULITIS OF LEG, RIGHT: ICD-10-CM

## 2021-12-30 DIAGNOSIS — I89.0 LYMPHEDEMA: ICD-10-CM

## 2021-12-30 DIAGNOSIS — M50.30 DDD (DEGENERATIVE DISC DISEASE), CERVICAL: ICD-10-CM

## 2021-12-30 DIAGNOSIS — G89.29 CHRONIC BILATERAL LOW BACK PAIN WITH BILATERAL SCIATICA: ICD-10-CM

## 2021-12-30 DIAGNOSIS — L03.115 CELLULITIS OF RIGHT LOWER EXTREMITY: ICD-10-CM

## 2021-12-30 DIAGNOSIS — M51.36 DDD (DEGENERATIVE DISC DISEASE), LUMBAR: ICD-10-CM

## 2021-12-30 DIAGNOSIS — R60.9 EDEMA, UNSPECIFIED TYPE: ICD-10-CM

## 2021-12-30 DIAGNOSIS — M54.42 CHRONIC BILATERAL LOW BACK PAIN WITH BILATERAL SCIATICA: ICD-10-CM

## 2021-12-30 PROCEDURE — 99285 EMERGENCY DEPT VISIT HI MDM: CPT

## 2021-12-30 PROCEDURE — 99215 OFFICE O/P EST HI 40 MIN: CPT | Performed by: STUDENT IN AN ORGANIZED HEALTH CARE EDUCATION/TRAINING PROGRAM

## 2021-12-30 RX ORDER — LABETALOL HYDROCHLORIDE 5 MG/ML
10 INJECTION, SOLUTION INTRAVENOUS EVERY 4 HOURS PRN
Status: DISCONTINUED | OUTPATIENT
Start: 2021-12-30 | End: 2022-01-02 | Stop reason: HOSPADM

## 2021-12-30 RX ORDER — AMOXICILLIN 250 MG
2 CAPSULE ORAL 2 TIMES DAILY
Status: DISCONTINUED | OUTPATIENT
Start: 2021-12-31 | End: 2022-01-02 | Stop reason: HOSPADM

## 2021-12-30 RX ORDER — FLUTICASONE PROPIONATE 44 UG/1
2 AEROSOL, METERED RESPIRATORY (INHALATION)
Status: DISCONTINUED | OUTPATIENT
Start: 2021-12-31 | End: 2022-01-02 | Stop reason: HOSPADM

## 2021-12-30 RX ORDER — BISACODYL 10 MG
10 SUPPOSITORY, RECTAL RECTAL
Status: DISCONTINUED | OUTPATIENT
Start: 2021-12-30 | End: 2022-01-02 | Stop reason: HOSPADM

## 2021-12-30 RX ORDER — PROMETHAZINE HYDROCHLORIDE 25 MG/1
12.5-25 SUPPOSITORY RECTAL EVERY 4 HOURS PRN
Status: DISCONTINUED | OUTPATIENT
Start: 2021-12-30 | End: 2022-01-02 | Stop reason: HOSPADM

## 2021-12-30 RX ORDER — METHOCARBAMOL 500 MG/1
TABLET, FILM COATED ORAL
Qty: 236 TABLET | Refills: 0 | Status: SHIPPED | OUTPATIENT
Start: 2021-12-30 | End: 2022-02-01

## 2021-12-30 RX ORDER — PROCHLORPERAZINE EDISYLATE 5 MG/ML
5-10 INJECTION INTRAMUSCULAR; INTRAVENOUS EVERY 4 HOURS PRN
Status: DISCONTINUED | OUTPATIENT
Start: 2021-12-30 | End: 2022-01-02 | Stop reason: HOSPADM

## 2021-12-30 RX ORDER — POLYETHYLENE GLYCOL 3350 17 G/17G
1 POWDER, FOR SOLUTION ORAL
Status: DISCONTINUED | OUTPATIENT
Start: 2021-12-30 | End: 2022-01-02 | Stop reason: HOSPADM

## 2021-12-30 RX ORDER — POTASSIUM CHLORIDE 20 MEQ/1
20 TABLET, EXTENDED RELEASE ORAL
Status: DISCONTINUED | OUTPATIENT
Start: 2021-12-31 | End: 2022-01-01

## 2021-12-30 RX ORDER — ONDANSETRON 4 MG/1
4 TABLET, ORALLY DISINTEGRATING ORAL EVERY 4 HOURS PRN
Status: DISCONTINUED | OUTPATIENT
Start: 2021-12-30 | End: 2022-01-02 | Stop reason: HOSPADM

## 2021-12-30 RX ORDER — ACETAMINOPHEN 325 MG/1
650 TABLET ORAL EVERY 6 HOURS PRN
Status: DISCONTINUED | OUTPATIENT
Start: 2021-12-30 | End: 2022-01-02 | Stop reason: HOSPADM

## 2021-12-30 RX ORDER — IPRATROPIUM BROMIDE AND ALBUTEROL SULFATE 2.5; .5 MG/3ML; MG/3ML
3 SOLUTION RESPIRATORY (INHALATION)
Status: DISCONTINUED | OUTPATIENT
Start: 2021-12-31 | End: 2022-01-02 | Stop reason: HOSPADM

## 2021-12-30 RX ORDER — METAXALONE 800 MG/1
800 TABLET ORAL 3 TIMES DAILY
Status: DISCONTINUED | OUTPATIENT
Start: 2021-12-31 | End: 2022-01-02 | Stop reason: HOSPADM

## 2021-12-30 RX ORDER — GABAPENTIN 300 MG/1
600 CAPSULE ORAL 3 TIMES DAILY
Status: DISCONTINUED | OUTPATIENT
Start: 2021-12-31 | End: 2022-01-02 | Stop reason: HOSPADM

## 2021-12-30 RX ORDER — FUROSEMIDE 40 MG/1
40 TABLET ORAL
Status: DISCONTINUED | OUTPATIENT
Start: 2021-12-31 | End: 2021-12-31

## 2021-12-30 RX ORDER — ONDANSETRON 2 MG/ML
4 INJECTION INTRAMUSCULAR; INTRAVENOUS EVERY 4 HOURS PRN
Status: DISCONTINUED | OUTPATIENT
Start: 2021-12-30 | End: 2022-01-02 | Stop reason: HOSPADM

## 2021-12-30 RX ORDER — PROMETHAZINE HYDROCHLORIDE 25 MG/1
12.5-25 TABLET ORAL EVERY 4 HOURS PRN
Status: DISCONTINUED | OUTPATIENT
Start: 2021-12-30 | End: 2022-01-02 | Stop reason: HOSPADM

## 2021-12-30 RX ORDER — METAXALONE 800 MG/1
TABLET ORAL
Qty: 90 TABLET | Refills: 0 | Status: SHIPPED | OUTPATIENT
Start: 2021-12-30 | End: 2022-02-28

## 2021-12-30 ASSESSMENT — FIBROSIS 4 INDEX: FIB4 SCORE: 1.439104835400370432

## 2021-12-31 ENCOUNTER — APPOINTMENT (OUTPATIENT)
Dept: RADIOLOGY | Facility: MEDICAL CENTER | Age: 56
End: 2021-12-31
Attending: STUDENT IN AN ORGANIZED HEALTH CARE EDUCATION/TRAINING PROGRAM
Payer: COMMERCIAL

## 2021-12-31 ENCOUNTER — APPOINTMENT (OUTPATIENT)
Dept: RADIOLOGY | Facility: MEDICAL CENTER | Age: 56
End: 2021-12-31
Attending: NURSE PRACTITIONER
Payer: COMMERCIAL

## 2021-12-31 PROBLEM — L03.90 CELLULITIS: Status: ACTIVE | Noted: 2021-12-31

## 2021-12-31 LAB
ANION GAP SERPL CALC-SCNC: 10 MMOL/L (ref 7–16)
BASOPHILS # BLD AUTO: 0.5 % (ref 0–1.8)
BASOPHILS # BLD: 0.04 K/UL (ref 0–0.12)
BUN SERPL-MCNC: 12 MG/DL (ref 8–22)
CALCIUM SERPL-MCNC: 9.5 MG/DL (ref 8.4–10.2)
CHLORIDE SERPL-SCNC: 100 MMOL/L (ref 96–112)
CO2 SERPL-SCNC: 27 MMOL/L (ref 20–33)
CREAT SERPL-MCNC: 0.61 MG/DL (ref 0.5–1.4)
CRP SERPL HS-MCNC: 2.56 MG/DL (ref 0–0.75)
EOSINOPHIL # BLD AUTO: 0.35 K/UL (ref 0–0.51)
EOSINOPHIL NFR BLD: 4.6 % (ref 0–6.9)
ERYTHROCYTE [DISTWIDTH] IN BLOOD BY AUTOMATED COUNT: 43.3 FL (ref 35.9–50)
ERYTHROCYTE [SEDIMENTATION RATE] IN BLOOD BY WESTERGREN METHOD: 32 MM/HOUR (ref 0–25)
GLUCOSE SERPL-MCNC: 95 MG/DL (ref 65–99)
HCT VFR BLD AUTO: 40.3 % (ref 37–47)
HGB BLD-MCNC: 13.3 G/DL (ref 12–16)
IMM GRANULOCYTES # BLD AUTO: 0.02 K/UL (ref 0–0.11)
IMM GRANULOCYTES NFR BLD AUTO: 0.3 % (ref 0–0.9)
LYMPHOCYTES # BLD AUTO: 2.15 K/UL (ref 1–4.8)
LYMPHOCYTES NFR BLD: 28.3 % (ref 22–41)
MAGNESIUM SERPL-MCNC: 2.2 MG/DL (ref 1.5–2.5)
MCH RBC QN AUTO: 28.9 PG (ref 27–33)
MCHC RBC AUTO-ENTMCNC: 33 G/DL (ref 33.6–35)
MCV RBC AUTO: 87.4 FL (ref 81.4–97.8)
MONOCYTES # BLD AUTO: 0.61 K/UL (ref 0–0.85)
MONOCYTES NFR BLD AUTO: 8 % (ref 0–13.4)
NEUTROPHILS # BLD AUTO: 4.42 K/UL (ref 2–7.15)
NEUTROPHILS NFR BLD: 58.3 % (ref 44–72)
NRBC # BLD AUTO: 0 K/UL
NRBC BLD-RTO: 0 /100 WBC
PHOSPHATE SERPL-MCNC: 3.5 MG/DL (ref 2.5–4.5)
PLATELET # BLD AUTO: 288 K/UL (ref 164–446)
PMV BLD AUTO: 9.7 FL (ref 9–12.9)
POTASSIUM SERPL-SCNC: 3.9 MMOL/L (ref 3.6–5.5)
RBC # BLD AUTO: 4.61 M/UL (ref 4.2–5.4)
SODIUM SERPL-SCNC: 137 MMOL/L (ref 135–145)
WBC # BLD AUTO: 7.6 K/UL (ref 4.8–10.8)

## 2021-12-31 PROCEDURE — 700111 HCHG RX REV CODE 636 W/ 250 OVERRIDE (IP): Performed by: NURSE PRACTITIONER

## 2021-12-31 PROCEDURE — 96375 TX/PRO/DX INJ NEW DRUG ADDON: CPT

## 2021-12-31 PROCEDURE — 73590 X-RAY EXAM OF LOWER LEG: CPT | Mod: RT

## 2021-12-31 PROCEDURE — 96367 TX/PROPH/DG ADDL SEQ IV INF: CPT

## 2021-12-31 PROCEDURE — 76937 US GUIDE VASCULAR ACCESS: CPT

## 2021-12-31 PROCEDURE — 99225 PR SUBSEQUENT OBSERVATION CARE,LEVEL II: CPT | Performed by: INTERNAL MEDICINE

## 2021-12-31 PROCEDURE — 96365 THER/PROPH/DIAG IV INF INIT: CPT

## 2021-12-31 PROCEDURE — 85652 RBC SED RATE AUTOMATED: CPT

## 2021-12-31 PROCEDURE — 700102 HCHG RX REV CODE 250 W/ 637 OVERRIDE(OP): Performed by: HOSPITALIST

## 2021-12-31 PROCEDURE — 80048 BASIC METABOLIC PNL TOTAL CA: CPT

## 2021-12-31 PROCEDURE — 700105 HCHG RX REV CODE 258: Performed by: EMERGENCY MEDICINE

## 2021-12-31 PROCEDURE — G0378 HOSPITAL OBSERVATION PER HR: HCPCS

## 2021-12-31 PROCEDURE — 700111 HCHG RX REV CODE 636 W/ 250 OVERRIDE (IP): Performed by: EMERGENCY MEDICINE

## 2021-12-31 PROCEDURE — 84100 ASSAY OF PHOSPHORUS: CPT

## 2021-12-31 PROCEDURE — 85025 COMPLETE CBC W/AUTO DIFF WBC: CPT

## 2021-12-31 PROCEDURE — A9270 NON-COVERED ITEM OR SERVICE: HCPCS | Performed by: HOSPITALIST

## 2021-12-31 PROCEDURE — 83735 ASSAY OF MAGNESIUM: CPT

## 2021-12-31 PROCEDURE — 700101 HCHG RX REV CODE 250: Performed by: EMERGENCY MEDICINE

## 2021-12-31 PROCEDURE — 700105 HCHG RX REV CODE 258: Performed by: NURSE PRACTITIONER

## 2021-12-31 PROCEDURE — 87040 BLOOD CULTURE FOR BACTERIA: CPT | Mod: 91

## 2021-12-31 PROCEDURE — 94640 AIRWAY INHALATION TREATMENT: CPT

## 2021-12-31 PROCEDURE — 86140 C-REACTIVE PROTEIN: CPT

## 2021-12-31 PROCEDURE — 700102 HCHG RX REV CODE 250 W/ 637 OVERRIDE(OP): Performed by: STUDENT IN AN ORGANIZED HEALTH CARE EDUCATION/TRAINING PROGRAM

## 2021-12-31 PROCEDURE — A9270 NON-COVERED ITEM OR SERVICE: HCPCS | Performed by: STUDENT IN AN ORGANIZED HEALTH CARE EDUCATION/TRAINING PROGRAM

## 2021-12-31 RX ORDER — FUROSEMIDE 40 MG/1
40 TABLET ORAL
Status: DISCONTINUED | OUTPATIENT
Start: 2021-12-31 | End: 2022-01-02 | Stop reason: HOSPADM

## 2021-12-31 RX ORDER — POTASSIUM CHLORIDE 20 MEQ/1
20 TABLET, EXTENDED RELEASE ORAL DAILY
COMMUNITY
End: 2022-04-03

## 2021-12-31 RX ORDER — MORPHINE SULFATE 15 MG/1
15 TABLET, FILM COATED, EXTENDED RELEASE ORAL EVERY 12 HOURS
Status: DISCONTINUED | OUTPATIENT
Start: 2021-12-31 | End: 2022-01-02 | Stop reason: HOSPADM

## 2021-12-31 RX ORDER — HYDROCODONE BITARTRATE AND ACETAMINOPHEN 10; 325 MG/1; MG/1
1 TABLET ORAL EVERY 6 HOURS PRN
Status: DISCONTINUED | OUTPATIENT
Start: 2021-12-31 | End: 2022-01-01

## 2021-12-31 RX ORDER — METAXALONE 800 MG/1
TABLET ORAL
Status: ACTIVE
Start: 2021-12-31 | End: 2021-12-31

## 2021-12-31 RX ORDER — CEFDINIR 300 MG/1
300 CAPSULE ORAL EVERY 12 HOURS
Status: DISCONTINUED | OUTPATIENT
Start: 2021-12-31 | End: 2021-12-31

## 2021-12-31 RX ADMIN — MORPHINE SULFATE 15 MG: 15 TABLET, FILM COATED, EXTENDED RELEASE ORAL at 19:05

## 2021-12-31 RX ADMIN — AMPICILLIN SODIUM AND SULBACTAM SODIUM 1.5 G: 1; .5 INJECTION, POWDER, FOR SOLUTION INTRAMUSCULAR; INTRAVENOUS at 23:05

## 2021-12-31 RX ADMIN — METAXALONE 800 MG: 800 TABLET ORAL at 12:25

## 2021-12-31 RX ADMIN — METAXALONE 800 MG: 800 TABLET ORAL at 17:28

## 2021-12-31 RX ADMIN — CEFDINIR 300 MG: 300 CAPSULE ORAL at 04:08

## 2021-12-31 RX ADMIN — AMPICILLIN SODIUM AND SULBACTAM SODIUM 1.5 G: 1; .5 INJECTION, POWDER, FOR SOLUTION INTRAMUSCULAR; INTRAVENOUS at 17:28

## 2021-12-31 RX ADMIN — GABAPENTIN 600 MG: 300 CAPSULE ORAL at 05:40

## 2021-12-31 RX ADMIN — FLUTICASONE PROPIONATE 88 MCG: 44 AEROSOL, METERED RESPIRATORY (INHALATION) at 20:18

## 2021-12-31 RX ADMIN — HYDROCODONE BITARTRATE AND ACETAMINOPHEN 1 TABLET: 10; 325 TABLET ORAL at 04:08

## 2021-12-31 RX ADMIN — GABAPENTIN 600 MG: 300 CAPSULE ORAL at 12:25

## 2021-12-31 RX ADMIN — METAXALONE 800 MG: 800 TABLET ORAL at 05:51

## 2021-12-31 RX ADMIN — AMPICILLIN SODIUM AND SULBACTAM SODIUM 1.5 G: 1; .5 INJECTION, POWDER, FOR SOLUTION INTRAMUSCULAR; INTRAVENOUS at 12:24

## 2021-12-31 RX ADMIN — HYDROCODONE BITARTRATE AND ACETAMINOPHEN 1 TABLET: 10; 325 TABLET ORAL at 14:19

## 2021-12-31 RX ADMIN — GABAPENTIN 600 MG: 300 CAPSULE ORAL at 17:28

## 2021-12-31 RX ADMIN — VANCOMYCIN HYDROCHLORIDE 2500 MG: 500 INJECTION, POWDER, LYOPHILIZED, FOR SOLUTION INTRAVENOUS at 01:58

## 2021-12-31 ASSESSMENT — ENCOUNTER SYMPTOMS
FLANK PAIN: 0
DIARRHEA: 0
DIZZINESS: 0
PHOTOPHOBIA: 0
MYALGIAS: 1
VOMITING: 0
BACK PAIN: 0
FEVER: 0
ABDOMINAL PAIN: 0
HEADACHES: 0
DEPRESSION: 0
ORTHOPNEA: 0
EYE PAIN: 0
WEAKNESS: 0
FALLS: 0
FOCAL WEAKNESS: 0
NERVOUS/ANXIOUS: 0
PALPITATIONS: 0
SHORTNESS OF BREATH: 0
COUGH: 0
SPEECH CHANGE: 0
NAUSEA: 0
SORE THROAT: 0
SENSORY CHANGE: 0
CHILLS: 0
HALLUCINATIONS: 0

## 2021-12-31 ASSESSMENT — COGNITIVE AND FUNCTIONAL STATUS - GENERAL
WALKING IN HOSPITAL ROOM: A LITTLE
CLIMB 3 TO 5 STEPS WITH RAILING: A LOT
STANDING UP FROM CHAIR USING ARMS: A LITTLE
MOBILITY SCORE: 19
DAILY ACTIVITIY SCORE: 20
MOVING FROM LYING ON BACK TO SITTING ON SIDE OF FLAT BED: A LITTLE
DRESSING REGULAR UPPER BODY CLOTHING: A LITTLE
SUGGESTED CMS G CODE MODIFIER MOBILITY: CK
SUGGESTED CMS G CODE MODIFIER DAILY ACTIVITY: CJ
HELP NEEDED FOR BATHING: A LITTLE
DRESSING REGULAR LOWER BODY CLOTHING: A LITTLE
TOILETING: A LITTLE

## 2021-12-31 ASSESSMENT — LIFESTYLE VARIABLES
EVER HAD A DRINK FIRST THING IN THE MORNING TO STEADY YOUR NERVES TO GET RID OF A HANGOVER: NO
HAVE PEOPLE ANNOYED YOU BY CRITICIZING YOUR DRINKING: NO
EVER FELT BAD OR GUILTY ABOUT YOUR DRINKING: NO
HOW MANY TIMES IN THE PAST YEAR HAVE YOU HAD 5 OR MORE DRINKS IN A DAY: 0
TOTAL SCORE: 0
ALCOHOL_USE: NO
CONSUMPTION TOTAL: NEGATIVE
AVERAGE NUMBER OF DAYS PER WEEK YOU HAVE A DRINK CONTAINING ALCOHOL: 0
HAVE YOU EVER FELT YOU SHOULD CUT DOWN ON YOUR DRINKING: NO
ON A TYPICAL DAY WHEN YOU DRINK ALCOHOL HOW MANY DRINKS DO YOU HAVE: 0
TOTAL SCORE: 0
TOTAL SCORE: 0

## 2021-12-31 ASSESSMENT — FIBROSIS 4 INDEX: FIB4 SCORE: 1.11930376086695478

## 2021-12-31 ASSESSMENT — PAIN DESCRIPTION - PAIN TYPE
TYPE: CHRONIC PAIN
TYPE: ACUTE PAIN
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
## 2021-12-31 NOTE — ASSESSMENT & PLAN NOTE
Does not appear to be on medications other than Lasix.  Initially hypertensive on arrival but blood pressure has normalized to SBP<140.  IV antihypertensives with parameters, she may need p.o. regimen initiated if she remains hypertensive

## 2021-12-31 NOTE — PROGRESS NOTES
"  Midline Instructions    How to Care for your Midline   · Do not take a bath, swim, or use hot tubs when you have a midline.   · Cover midline with clear plastic wrap and tape to keep it dry while showering.   · Check the midline insertion site daily for leakage, redness, swelling, or pain.   · Flush the midline as directed by your health care provider. Let your health care provider know right away if the midine is difficult to flush or does not flush. Do not use force to flush the midline.   · Do not use a syringe that is less than 10 mL to flush the midline.   · Avoid blood pressure checks on the arm with the midline. Do not take the midline out yourself.   · Only a trained clinical professional should remove the midline.   · Make sure you or anyone who access your midline washes their hands before using the line.   · Make sure the hub of the line is \"scrubbed\" prior to using the line.    Dressing Changes  · Change the midline dressing as instructed by your health care provider.   · Change your midline dressing if it becomes loose or wet.    When to seek medical attention   · Midline is accidentally pulled all the way out.  · There is any type of drainage, redness, or swelling where the midline enters the skin.   · Noticeable increase in arm circumference due to swelling of arm. You cannot flush the midline, it is difficult to flush, or the midline leaks around the insertion site when it is flushed.   · You notice a hole or tear in the midline. You develop chills or a fever.    "

## 2021-12-31 NOTE — ASSESSMENT & PLAN NOTE
Present on arrival.  Managed by [facility] outpatient.  Continue metaxalone and gabapentin per home regimen.  Patient reports pruritus with hydrocodone administration.  Discontinue Norco and resumed Percocet per home regimen

## 2021-12-31 NOTE — ASSESSMENT & PLAN NOTE
Present on arrival.  Continue Lasix IV.  Renal function WNL  Na 140, K 4.2  Patient established outpatient with lymphedema [id number] outpatient
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
## 2021-12-31 NOTE — ED NOTES
Med Rec completed per patient   Allergies reviewed  No ORAL antibiotics in last 30 days    Patient takes both Robaxin and Skelaxin
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
## 2021-12-31 NOTE — PROGRESS NOTES
Vascular Access Team    Date of Insertion: 12/31/2021  Arm Circumference: 38 cm  Internal length: 11 cm  External Length: 0 cm  Vein Occupancy: 32 %  Reason for Midline: ABX > 7 days  Labs within procedure parameters.    Order confirmed. Risks and benefits of procedure explained to patient and education regarding line associated bloodstream infections provided. Questions answered. Vessel patency confirmed using ultrasound. 3 mL of 1% lidocaine injected intradermally, 21 gauge microintroducer needle and modified Seldinger technique used. Catheter cut to 11 cm. 4 Fr, single lumen Power Midline placed in Brachial vein after one attempt(s).  Secured at -0- cm marker. Each lumen flushed without resistance with 10 mL 0.9% normal saline. Midline secured with StatLock. Biopatch and Tegaderm placed over insertion site.     Midline placement is confirmed by nurse using ultrasound and ability to flush and draw blood. No X-ray is needed for placement confirmation.  Midline is appropriate for use at this time.  Procedure tolerated well.  Patient condition relayed to unit RN or ordering physician via this post procedure note in the EMR.     Ultrasound images uploaded to PACS and viewable in the EMR -yes  Ultrasound images printed and placed in paper chart - no     BARD Power Midline Lot #: [id number]   Expiration Date: 01/31/2022
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
## 2022-01-01 LAB
ANION GAP SERPL CALC-SCNC: 7 MMOL/L (ref 7–16)
BUN SERPL-MCNC: 9 MG/DL (ref 8–22)
CALCIUM SERPL-MCNC: 8.7 MG/DL (ref 8.4–10.2)
CHLORIDE SERPL-SCNC: 107 MMOL/L (ref 96–112)
CO2 SERPL-SCNC: 26 MMOL/L (ref 20–33)
CREAT SERPL-MCNC: 0.47 MG/DL (ref 0.5–1.4)
ERYTHROCYTE [DISTWIDTH] IN BLOOD BY AUTOMATED COUNT: 43.5 FL (ref 35.9–50)
GLUCOSE SERPL-MCNC: 88 MG/DL (ref 65–99)
HCT VFR BLD AUTO: 35.9 % (ref 37–47)
HGB BLD-MCNC: 12.2 G/DL (ref 12–16)
MCH RBC QN AUTO: 29.5 PG (ref 27–33)
MCHC RBC AUTO-ENTMCNC: 34 G/DL (ref 33.6–35)
MCV RBC AUTO: 86.9 FL (ref 81.4–97.8)
PLATELET # BLD AUTO: 270 K/UL (ref 164–446)
PMV BLD AUTO: 10.1 FL (ref 9–12.9)
POTASSIUM SERPL-SCNC: 4.2 MMOL/L (ref 3.6–5.5)
RBC # BLD AUTO: 4.13 M/UL (ref 4.2–5.4)
SODIUM SERPL-SCNC: 140 MMOL/L (ref 135–145)
WBC # BLD AUTO: 6.4 K/UL (ref 4.8–10.8)

## 2022-01-01 PROCEDURE — 80048 BASIC METABOLIC PNL TOTAL CA: CPT

## 2022-01-01 PROCEDURE — A9270 NON-COVERED ITEM OR SERVICE: HCPCS | Performed by: HOSPITALIST

## 2022-01-01 PROCEDURE — 700102 HCHG RX REV CODE 250 W/ 637 OVERRIDE(OP): Performed by: STUDENT IN AN ORGANIZED HEALTH CARE EDUCATION/TRAINING PROGRAM

## 2022-01-01 PROCEDURE — 700102 HCHG RX REV CODE 250 W/ 637 OVERRIDE(OP): Performed by: HOSPITALIST

## 2022-01-01 PROCEDURE — A9270 NON-COVERED ITEM OR SERVICE: HCPCS | Performed by: NURSE PRACTITIONER

## 2022-01-01 PROCEDURE — 96372 THER/PROPH/DIAG INJ SC/IM: CPT

## 2022-01-01 PROCEDURE — 85027 COMPLETE CBC AUTOMATED: CPT

## 2022-01-01 PROCEDURE — 94640 AIRWAY INHALATION TREATMENT: CPT

## 2022-01-01 PROCEDURE — 99225 PR SUBSEQUENT OBSERVATION CARE,LEVEL II: CPT | Performed by: INTERNAL MEDICINE

## 2022-01-01 PROCEDURE — 96366 THER/PROPH/DIAG IV INF ADDON: CPT

## 2022-01-01 PROCEDURE — 94760 N-INVAS EAR/PLS OXIMETRY 1: CPT

## 2022-01-01 PROCEDURE — G0378 HOSPITAL OBSERVATION PER HR: HCPCS

## 2022-01-01 PROCEDURE — 700111 HCHG RX REV CODE 636 W/ 250 OVERRIDE (IP): Performed by: STUDENT IN AN ORGANIZED HEALTH CARE EDUCATION/TRAINING PROGRAM

## 2022-01-01 PROCEDURE — A9270 NON-COVERED ITEM OR SERVICE: HCPCS | Performed by: STUDENT IN AN ORGANIZED HEALTH CARE EDUCATION/TRAINING PROGRAM

## 2022-01-01 PROCEDURE — 700111 HCHG RX REV CODE 636 W/ 250 OVERRIDE (IP): Performed by: NURSE PRACTITIONER

## 2022-01-01 PROCEDURE — 700105 HCHG RX REV CODE 258: Performed by: NURSE PRACTITIONER

## 2022-01-01 PROCEDURE — 36415 COLL VENOUS BLD VENIPUNCTURE: CPT

## 2022-01-01 PROCEDURE — 700102 HCHG RX REV CODE 250 W/ 637 OVERRIDE(OP): Performed by: NURSE PRACTITIONER

## 2022-01-01 RX ORDER — OXYCODONE AND ACETAMINOPHEN 10; 325 MG/1; MG/1
1 TABLET ORAL EVERY 6 HOURS PRN
Status: DISCONTINUED | OUTPATIENT
Start: 2022-01-01 | End: 2022-01-02 | Stop reason: HOSPADM

## 2022-01-01 RX ADMIN — AMPICILLIN SODIUM AND SULBACTAM SODIUM 1.5 G: 1; .5 INJECTION, POWDER, FOR SOLUTION INTRAMUSCULAR; INTRAVENOUS at 18:01

## 2022-01-01 RX ADMIN — FUROSEMIDE 40 MG: 40 TABLET ORAL at 05:47

## 2022-01-01 RX ADMIN — METAXALONE 800 MG: 800 TABLET ORAL at 11:51

## 2022-01-01 RX ADMIN — GABAPENTIN 600 MG: 300 CAPSULE ORAL at 11:51

## 2022-01-01 RX ADMIN — GABAPENTIN 600 MG: 300 CAPSULE ORAL at 18:01

## 2022-01-01 RX ADMIN — GABAPENTIN 600 MG: 300 CAPSULE ORAL at 05:46

## 2022-01-01 RX ADMIN — HYDROCODONE BITARTRATE AND ACETAMINOPHEN 1 TABLET: 10; 325 TABLET ORAL at 08:14

## 2022-01-01 RX ADMIN — OXYCODONE AND ACETAMINOPHEN 1 TABLET: 10; 325 TABLET ORAL at 14:23

## 2022-01-01 RX ADMIN — METAXALONE 800 MG: 800 TABLET ORAL at 18:01

## 2022-01-01 RX ADMIN — FLUTICASONE PROPIONATE 88 MCG: 44 AEROSOL, METERED RESPIRATORY (INHALATION) at 08:18

## 2022-01-01 RX ADMIN — MORPHINE SULFATE 15 MG: 15 TABLET, FILM COATED, EXTENDED RELEASE ORAL at 18:05

## 2022-01-01 RX ADMIN — ENOXAPARIN SODIUM 40 MG: 40 INJECTION SUBCUTANEOUS at 05:45

## 2022-01-01 RX ADMIN — AMPICILLIN SODIUM AND SULBACTAM SODIUM 1.5 G: 1; .5 INJECTION, POWDER, FOR SOLUTION INTRAMUSCULAR; INTRAVENOUS at 23:45

## 2022-01-01 RX ADMIN — MORPHINE SULFATE 15 MG: 15 TABLET, FILM COATED, EXTENDED RELEASE ORAL at 05:47

## 2022-01-01 RX ADMIN — METAXALONE 800 MG: 800 TABLET ORAL at 05:48

## 2022-01-01 RX ADMIN — AMPICILLIN SODIUM AND SULBACTAM SODIUM 1.5 G: 1; .5 INJECTION, POWDER, FOR SOLUTION INTRAMUSCULAR; INTRAVENOUS at 05:45

## 2022-01-01 RX ADMIN — FLUTICASONE PROPIONATE 88 MCG: 44 AEROSOL, METERED RESPIRATORY (INHALATION) at 21:01

## 2022-01-01 RX ADMIN — AMPICILLIN SODIUM AND SULBACTAM SODIUM 1.5 G: 1; .5 INJECTION, POWDER, FOR SOLUTION INTRAMUSCULAR; INTRAVENOUS at 11:51

## 2022-01-01 RX ADMIN — SENNOSIDES AND DOCUSATE SODIUM 2 TABLET: 8.6; 5 TABLET ORAL at 05:46

## 2022-01-01 ASSESSMENT — ENCOUNTER SYMPTOMS
SENSORY CHANGE: 0
FOCAL WEAKNESS: 0
DEPRESSION: 0
FALLS: 0
ABDOMINAL PAIN: 0
NAUSEA: 0
PALPITATIONS: 0
VOMITING: 0
BACK PAIN: 1
DIARRHEA: 0
CONSTIPATION: 0
WEAKNESS: 0
DIZZINESS: 0
COUGH: 0
SHORTNESS OF BREATH: 0
FLANK PAIN: 0
FEVER: 0
NERVOUS/ANXIOUS: 0
TINGLING: 0
ORTHOPNEA: 0
HEADACHES: 0
CHILLS: 0
SORE THROAT: 0
NECK PAIN: 1

## 2022-01-01 ASSESSMENT — PAIN DESCRIPTION - PAIN TYPE
TYPE: ACUTE PAIN
TYPE: ACUTE PAIN

## 2022-01-01 ASSESSMENT — LIFESTYLE VARIABLES: SUBSTANCE_ABUSE: 0

## 2022-01-01 NOTE — PROGRESS NOTES
Pt arrived to floor via gurney. Ambulated from gurney to bed with cane x1 assist. Complaints of pain to RLE, medicated per MAR. RLE cellulitis noted, open area covered with foam dressing. Midline to RUE in place. Pt is incontinent, female wick initiated. All needs met at this time. Continue to monitor

## 2022-01-01 NOTE — PROGRESS NOTES
4 Eyes Skin Assessment Completed by VERO Urena and VERO Baptiste.    Head WDL  Ears WDL  Nose WDL  Mouth WDL  Neck WDL  Breast/Chest Incision  Shoulder Blades WDL  Spine Incision  (R) Arm/Elbow/Hand WDL  (L) Arm/Elbow/Hand WDL  Abdomen WDL  Groin WDL  Scrotum/Coccyx/Buttocks WDL  (R) Leg Redness, Swelling and Edema  (L) Leg WDL  (R) Heel/Foot/Toe Swelling  (L) Heel/Foot/Toe WDL          Devices In Places Central Line      Interventions In Place Pillows, Barrier Cream and Pressure Redistribution Mattress    Possible Skin Injury Yes    Pictures Uploaded Into Epic Yes  Wound Consult Placed Yes    Pt has open cellulitis to RLE, covered with foam dressing. Skin is intact otherwise aside from old scarring

## 2022-01-01 NOTE — CARE PLAN
The patient is Stable - Low risk of patient condition declining or worsening    Shift Goals  Clinical Goals: Pain control, IV abx, and monitor for signs and symptoms of inf  Patient Goals: Pain control  Family Goals:  (None present at this time)    Progress made toward(s) clinical / shift goals: Patient was able to sleep throughout a majority of the night, requesting oxycodone this AM to e added to Morphine ER. Pt stated that she normally takes oxycodone along with morphine ER at home.     Patient is not progressing towards the following goals: N/A
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
## 2022-01-01 NOTE — PROGRESS NOTES
Received report from day shift RN. Assumed care of patient. Patient is currently AOx4, reporting 10/10 pain, pt reported that she takes the morphine ER at home, refusing Norco at this time. Pt requesting something to eat at this time as well, food / snacks provided. POC reviewed with patient and IV abx in use at this time, pt verbalized understanding. Dressing CDI to RLE shin. Bed locked and in lowest position, call light and belongings within reach. Chart check complete.

## 2022-01-01 NOTE — FLOWSHEET NOTE
01/01/22 0800   Vital Signs   Pulse 90   Respiration 16   Pulse Oximetry 97 %   $ Pulse Oximetry (Spot Check) Yes   Respiratory Assessment   Level of Consciousness Alert   Chest Exam   Work Of Breathing / Effort Within Normal Limits   Breath Sounds   RUL Breath Sounds Clear   RML Breath Sounds Clear   RLL Breath Sounds Diminished   ZORAN Breath Sounds Clear   LLL Breath Sounds Diminished   Secretions   Cough Dry;Non Productive   Oxygen   O2 Delivery Device Room air w/o2 available
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
## 2022-01-01 NOTE — CARE PLAN
Problem: Knowledge Deficit - Standard  Goal: Patient and family/care givers will demonstrate understanding of plan of care, disease process/condition, diagnostic tests and medications  Outcome: Progressing     Problem: Pain - Standard  Goal: Alleviation of pain or a reduction in pain to the patient’s comfort goal  Outcome: Progressing     Problem: Skin Integrity  Goal: Skin integrity is maintained or improved  Outcome: Progressing     Problem: Fall Risk  Goal: Patient will remain free from falls  Outcome: Progressing       The patient is Stable - Low risk of patient condition declining or worsening    Shift Goals  Clinical Goals: Pain management, ROM, mobility  Patient Goals: pain management  Family Goals:  (None present at this time)    Progress made toward(s) clinical / shift goals:  New pain management orders received.     Patient is not progressing towards the following goals: Pt resting in bed, encourage ambulation or ROM, pt declined. Will need possible PT eval

## 2022-01-02 VITALS
SYSTOLIC BLOOD PRESSURE: 123 MMHG | WEIGHT: 217.81 LBS | RESPIRATION RATE: 18 BRPM | HEIGHT: 63 IN | BODY MASS INDEX: 38.59 KG/M2 | HEART RATE: 85 BPM | OXYGEN SATURATION: 97 % | TEMPERATURE: 97.7 F | DIASTOLIC BLOOD PRESSURE: 71 MMHG

## 2022-01-02 LAB
ANION GAP SERPL CALC-SCNC: 12 MMOL/L (ref 7–16)
BUN SERPL-MCNC: 12 MG/DL (ref 8–22)
CALCIUM SERPL-MCNC: 8.9 MG/DL (ref 8.4–10.2)
CHLORIDE SERPL-SCNC: 104 MMOL/L (ref 96–112)
CO2 SERPL-SCNC: 25 MMOL/L (ref 20–33)
CREAT SERPL-MCNC: 0.57 MG/DL (ref 0.5–1.4)
ERYTHROCYTE [DISTWIDTH] IN BLOOD BY AUTOMATED COUNT: 43.8 FL (ref 35.9–50)
GLUCOSE SERPL-MCNC: 84 MG/DL (ref 65–99)
HCT VFR BLD AUTO: 39.2 % (ref 37–47)
HGB BLD-MCNC: 13.1 G/DL (ref 12–16)
MCH RBC QN AUTO: 29.2 PG (ref 27–33)
MCHC RBC AUTO-ENTMCNC: 33.4 G/DL (ref 33.6–35)
MCV RBC AUTO: 87.5 FL (ref 81.4–97.8)
PLATELET # BLD AUTO: 294 K/UL (ref 164–446)
PMV BLD AUTO: 9.9 FL (ref 9–12.9)
POTASSIUM SERPL-SCNC: 4.1 MMOL/L (ref 3.6–5.5)
RBC # BLD AUTO: 4.48 M/UL (ref 4.2–5.4)
SODIUM SERPL-SCNC: 141 MMOL/L (ref 135–145)
WBC # BLD AUTO: 7.2 K/UL (ref 4.8–10.8)

## 2022-01-02 PROCEDURE — G0378 HOSPITAL OBSERVATION PER HR: HCPCS

## 2022-01-02 PROCEDURE — 700102 HCHG RX REV CODE 250 W/ 637 OVERRIDE(OP): Performed by: NURSE PRACTITIONER

## 2022-01-02 PROCEDURE — 85027 COMPLETE CBC AUTOMATED: CPT

## 2022-01-02 PROCEDURE — A9270 NON-COVERED ITEM OR SERVICE: HCPCS | Performed by: HOSPITALIST

## 2022-01-02 PROCEDURE — 36415 COLL VENOUS BLD VENIPUNCTURE: CPT

## 2022-01-02 PROCEDURE — A9270 NON-COVERED ITEM OR SERVICE: HCPCS | Performed by: NURSE PRACTITIONER

## 2022-01-02 PROCEDURE — A9270 NON-COVERED ITEM OR SERVICE: HCPCS | Performed by: STUDENT IN AN ORGANIZED HEALTH CARE EDUCATION/TRAINING PROGRAM

## 2022-01-02 PROCEDURE — 700111 HCHG RX REV CODE 636 W/ 250 OVERRIDE (IP): Performed by: NURSE PRACTITIONER

## 2022-01-02 PROCEDURE — 700111 HCHG RX REV CODE 636 W/ 250 OVERRIDE (IP): Performed by: STUDENT IN AN ORGANIZED HEALTH CARE EDUCATION/TRAINING PROGRAM

## 2022-01-02 PROCEDURE — 94760 N-INVAS EAR/PLS OXIMETRY 1: CPT

## 2022-01-02 PROCEDURE — 99217 PR OBSERVATION CARE DISCHARGE: CPT | Performed by: INTERNAL MEDICINE

## 2022-01-02 PROCEDURE — 96372 THER/PROPH/DIAG INJ SC/IM: CPT

## 2022-01-02 PROCEDURE — 700102 HCHG RX REV CODE 250 W/ 637 OVERRIDE(OP): Performed by: HOSPITALIST

## 2022-01-02 PROCEDURE — 700102 HCHG RX REV CODE 250 W/ 637 OVERRIDE(OP): Performed by: STUDENT IN AN ORGANIZED HEALTH CARE EDUCATION/TRAINING PROGRAM

## 2022-01-02 PROCEDURE — 80048 BASIC METABOLIC PNL TOTAL CA: CPT

## 2022-01-02 PROCEDURE — 96366 THER/PROPH/DIAG IV INF ADDON: CPT

## 2022-01-02 PROCEDURE — 94640 AIRWAY INHALATION TREATMENT: CPT

## 2022-01-02 PROCEDURE — 97597 DBRDMT OPN WND 1ST 20 CM/<: CPT

## 2022-01-02 PROCEDURE — 700105 HCHG RX REV CODE 258: Performed by: NURSE PRACTITIONER

## 2022-01-02 RX ORDER — FLUTICASONE PROPIONATE 44 MCG
2 AEROSOL WITH ADAPTER (GRAM) INHALATION 2 TIMES DAILY
Qty: 1 EACH | Refills: 11 | Status: SHIPPED | OUTPATIENT
Start: 2022-01-02 | End: 2022-08-29 | Stop reason: SDUPTHER

## 2022-01-02 RX ORDER — FUROSEMIDE 40 MG/1
40 TABLET ORAL DAILY
Qty: 90 TABLET | Refills: 4 | Status: SHIPPED | OUTPATIENT
Start: 2022-01-02 | End: 2022-02-28 | Stop reason: SDUPTHER

## 2022-01-02 RX ORDER — AMOXICILLIN AND CLAVULANATE POTASSIUM 875; 125 MG/1; MG/1
1 TABLET, FILM COATED ORAL 2 TIMES DAILY
Qty: 7 TABLET | Refills: 0 | Status: SHIPPED | OUTPATIENT
Start: 2022-01-02 | End: 2022-01-06

## 2022-01-02 RX ADMIN — FLUTICASONE PROPIONATE 88 MCG: 44 AEROSOL, METERED RESPIRATORY (INHALATION) at 06:56

## 2022-01-02 RX ADMIN — GABAPENTIN 600 MG: 300 CAPSULE ORAL at 11:13

## 2022-01-02 RX ADMIN — METAXALONE 800 MG: 800 TABLET ORAL at 11:13

## 2022-01-02 RX ADMIN — SENNOSIDES AND DOCUSATE SODIUM 2 TABLET: 8.6; 5 TABLET ORAL at 06:12

## 2022-01-02 RX ADMIN — METAXALONE 800 MG: 800 TABLET ORAL at 06:11

## 2022-01-02 RX ADMIN — AMPICILLIN SODIUM AND SULBACTAM SODIUM 1.5 G: 1; .5 INJECTION, POWDER, FOR SOLUTION INTRAMUSCULAR; INTRAVENOUS at 06:05

## 2022-01-02 RX ADMIN — AMPICILLIN SODIUM AND SULBACTAM SODIUM 1.5 G: 1; .5 INJECTION, POWDER, FOR SOLUTION INTRAMUSCULAR; INTRAVENOUS at 11:14

## 2022-01-02 RX ADMIN — GABAPENTIN 600 MG: 300 CAPSULE ORAL at 06:11

## 2022-01-02 RX ADMIN — ENOXAPARIN SODIUM 40 MG: 40 INJECTION SUBCUTANEOUS at 06:14

## 2022-01-02 RX ADMIN — MORPHINE SULFATE 15 MG: 15 TABLET, FILM COATED, EXTENDED RELEASE ORAL at 06:10

## 2022-01-02 RX ADMIN — OXYCODONE AND ACETAMINOPHEN 1 TABLET: 10; 325 TABLET ORAL at 16:08

## 2022-01-02 RX ADMIN — OXYCODONE AND ACETAMINOPHEN 1 TABLET: 10; 325 TABLET ORAL at 10:02

## 2022-01-02 RX ADMIN — FUROSEMIDE 40 MG: 40 TABLET ORAL at 06:11

## 2022-01-02 ASSESSMENT — PAIN DESCRIPTION - PAIN TYPE
TYPE: CHRONIC PAIN
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
## 2022-01-02 NOTE — PROGRESS NOTES
Received report from night shift RN, assumed care of pt. RODRIGO Tomas. Pt states pain 8/10 at this time. Denies nausea or SOB. Updated on plan of care for the day and answered any questions. Safety precautions in place, pt educated to call for assistance.

## 2022-01-02 NOTE — PROGRESS NOTES
Pt AOX4, able to make needs known. Purewick in place. Pt remains on IV abx r/t cellulitis RLE. Dressing in place and CDI. Complaints of pain, medicated per MAR. All needs met, continue to monitor

## 2022-01-02 NOTE — PROGRESS NOTES
Received report from day shift RN. Assumed care of patient. Patient currently resting at this time, no signs of distress. Purewick in place. Will discuss POC with patient when awake. Bed locked and in lowest position, call light and belongings within reach. Chart check complete.
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
## 2022-01-02 NOTE — CARE PLAN
The patient is Watcher - Medium risk of patient condition declining or worsening    Shift Goals  Clinical Goals: Pain management, ROM, and increase mobility   Patient Goals: Pain mangement  Family Goals:  (None present at this time)    Progress made toward(s) clinical / shift goals:     Patient is not progressing towards the following goals: N/A

## 2022-01-02 NOTE — DISCHARGE PLANNING
Anticipated Discharge Disposition: Home with outpatient wound care.    Action: Medical provider reported that patient is medically clear to discharge home with outpatient wound care. LSW called [facility] ([id number]) to schedule appointment. Office closed and voicemail left with scheduling. Medical provider reporting patient to discharge without confirmation of appointment as long as CM follows up with wound clinic on Monday.    Barriers to Discharge: Arrangement of outpatient wound care.    Plan: Home with outpatient wound care; CM to call [facility] on Monday to verify first appointment to be scheduled with patient.

## 2022-01-02 NOTE — DISCHARGE INSTRUCTIONS
Discharge Instructions    Discharged to home by car with friend. Discharged via wheelchair, hospital escort: Yes.  Special equipment needed: Not Applicable    Be sure to schedule a follow-up appointment with your primary care doctor or any specialists as instructed.     Discharge Plan:   Diet Plan: Discussed  Activity Level: Discussed  Confirmed Follow up Appointment: Patient to Call and Schedule Appointment  Confirmed Symptoms Management: Discussed  Medication Reconciliation Updated: Yes  Influenza Vaccine Indication: Patient Refuses    I understand that a diet low in cholesterol, fat, and sodium is recommended for good health. Unless I have been given specific instructions below for another diet, I accept this instruction as my diet prescription.   Other diet: Resume home diet     Special Instructions: None    · Is patient discharged on Warfarin / Coumadin?   No     Depression / Suicide Risk    As [facility] are [facility], it is important to learn how to keep safe from harming yourself.    Recognize the warning signs:  · Abrupt changes in personality, positive or negative- including increase in energy   · Giving away possessions  · Change in eating patterns- significant weight changes-  positive or negative  · Change in sleeping patterns- unable to sleep or sleeping all the time   · Unwillingness or inability to communicate  · Depression  · Unusual sadness, discouragement and loneliness  · Talk of wanting to die  · Neglect of personal appearance   · Rebelliousness- reckless behavior  · Withdrawal from people/activities they love  · Confusion- inability to concentrate     If you or a loved one observes any of these behaviors or has concerns about self-harm, here's what you can do:  · Talk about it- your feelings and reasons for harming yourself  · Remove any means that you might use to hurt yourself (examples: pills, rope, extension cords, firearm)  · Get professional help from the  community (Mental Health, Substance Abuse, psychological counseling)  · Do not be alone:Call your Safe Contact- someone whom you trust who will be there for you.  · Call your local CRISIS HOTLINE 964-0837 or 143-048-4431  · Call your local Children's Mobile Crisis Response Team [place] (866) 439-4500 or www.RawFlow  · Call the toll free National Suicide Prevention Hotlines   · National Suicide Prevention Lifeline 425-181-RRJN (5507)  · National Hope Line Network 800-SUICIDE (151-6772)

## 2022-01-05 LAB
BACTERIA BLD CULT: NORMAL
BACTERIA BLD CULT: NORMAL
SIGNIFICANT IND 70042: NORMAL
SIGNIFICANT IND 70042: NORMAL
SITE SITE: NORMAL
SITE SITE: NORMAL
SOURCE SOURCE: NORMAL
SOURCE SOURCE: NORMAL

## 2022-01-10 ENCOUNTER — APPOINTMENT (OUTPATIENT)
Dept: PHYSICAL THERAPY | Facility: REHABILITATION | Age: 57
End: 2022-01-10
Attending: NURSE PRACTITIONER
Payer: COMMERCIAL

## 2022-01-10 NOTE — OP THERAPY EVALUATION
"  Outpatient Physical Therapy  LYMPHEDEMA THERAPY INITIAL EVALUATION    [facility] [place] [address]  [address]  [place] [address]  Phone:  [phone]  Fax:  [phone]    Date of Evaluation: 01/10/2022    Patient: Karine Ovalle  YOB: 1965  MRN: [id number]     Referring Provider: ADITYA Talbot  [address] [place] [address]   Referring Diagnosis Lymphedema [I89.0]     Time Calculation                       Chief Complaint: No chief complaint on file.    {No diagnosis found. (Refresh or delete this SmartLink)}    Subjective    Past Medical History:   Diagnosis Date   • Administrative encounter- RTC ACCESS/ADA PARATRANSIT ELIGIBILITY 6/4/2019   • Anemia    • Arthritis     osteo/hips and back   • At risk for falls    • Chronic back pain    • Dental disorder     lower denture   • Pain 12/04/2018    \"ALL OVER\", 10/10   • Pain 02/04/2019    arthritic pain   • Urinary incontinence     using pads     Past Surgical History:   Procedure Laterality Date   • HIP ARTHROPLASTY TOTAL Left 2/13/2019    Procedure: HIP ARTHROPLASTY TOTAL, Cabling of intra-operative calcar fracture;  Surgeon: Bryon Levine M.D.;  Location: [facility];  Service: Orthopedics   • CERVICAL FUSION POSTERIOR  12/7/2018    Procedure: CERVICAL FUSION POSTERIOR- STAGE #2 C3-5 AND C3-T1;  Surgeon: Emre Mendoza III, M.D.;  Location: [facility];  Service: Neurosurgery   • CERVICAL LAMINECTOMY POSTERIOR  12/7/2018    Procedure: CERVICAL LAMINECTOMY POSTERIOR C2-T1;  Surgeon: Emre Mendoza III, M.D.;  Location: [facility];  Service: Neurosurgery   • CERVICAL DISK AND FUSION ANTERIOR  12/5/2018    Procedure: CERVICAL DISK AND FUSION ANTERIOR-STAGE #1 C4-5;  Surgeon: Emre Mendoza III, M.D.;  Location: [facility];  Service: Neurosurgery   • CORPECTOMY  12/5/2018    Procedure: CORPECTOMY;  Surgeon: Emre Mendoza III, M.D.;  Location: " [facility];  Service: Neurosurgery   • HIP ARTHROPLASTY TOTAL Right 3/20/2018    Procedure: HIP ARTHROPLASTY TOTAL;  Surgeon: Bryon Levine M.D.;  Location: [facility];  Service: Orthopedics   • KNEE ARTHROPLASTY TOTAL Right 10/20/2015    Procedure: KNEE ARTHROPLASTY TOTAL;  Surgeon: Bryon Levine M.D.;  Location: SURGERY [facility];  Service:    • APPENDECTOMY LAPAROSCOPIC  3/21/2013    Performed by Dali Queen M.D. at SURGERY [facility]   • DEBRIDEMENT  5/17/2012    Performed by BRADLEY DYKES at SURGERY [facility]   • PLASTIC SURGERY  2004    excess skin on arms and legs removed   • MAMMOPLASTY AUGMENTATION Bilateral 2004    breast implants and \"tummy tuck\"   • GASTRIC BYPASS LAPAROSCOPIC  2002    x 2   • ABDOMINAL EXPLORATION     • OTHER      breast augmentation 2004   • OTHER      Gastric bypass 2002     Social History     Tobacco Use   • Smoking status: Never Smoker   • Smokeless tobacco: Never Used   Substance Use Topics   • Alcohol use: Not Currently     Comment: 3-4 per week; pt stops alcohol use 1-week ago     Family and Occupational History     Socioeconomic History   • Marital status: Single     Spouse name: Not on file   • Number of children: Not on file   • Years of education: Not on file   • Highest education level: Not on file   Occupational History   • Not on file       Lymphedema Objective    Exercises/Treatment  Time-based treatments/modalities:           LYMPHEDEMA ASSESSMENT AND PLAN    Functional Assessment Used        Referring provider co-signature:  I have reviewed this plan of care and my co-signature certifies the need for services.    Certification Period: 01/10/2022 to  03/07/22    Physician Signature: ________________________________ Date: ______________        "

## 2022-01-11 ENCOUNTER — NON-PROVIDER VISIT (OUTPATIENT)
Dept: WOUND CARE | Facility: MEDICAL CENTER | Age: 57
End: 2022-01-11
Attending: NURSE PRACTITIONER
Payer: COMMERCIAL

## 2022-01-11 DIAGNOSIS — I87.2 VENOUS INSUFFICIENCY: ICD-10-CM

## 2022-01-11 PROCEDURE — 97597 DBRDMT OPN WND 1ST 20 CM/<: CPT

## 2022-01-11 PROCEDURE — 97598 DBRDMT OPN WND ADDL 20CM/<: CPT

## 2022-01-11 PROCEDURE — 16020 DRESS/DEBRID P-THICK BURN S: CPT

## 2022-01-11 PROCEDURE — 99211 OFF/OP EST MAY X REQ PHY/QHP: CPT

## 2022-01-11 ASSESSMENT — PAIN SCALES - GENERAL: PAINLEVEL: 3=SLIGHT PAIN

## 2022-01-11 NOTE — PATIENT INSTRUCTIONS
-Keep your wound dressing clean, dry, and intact.    -Change your dressing if it becomes soiled, soaked, or falls off.    -Should you experience any significant changes in your wound(s), such as infection (redness, swelling, localized heat, increased pain, fever > 101 F, chills) or have any questions regarding your home care instructions, please contact the wound center at [phone]. If after hours, contact your primary care physician or go to the hospital emergency room.

## 2022-01-13 NOTE — PROCEDURES
2 % Lidocaine with 5 minute dwell time.  CSWD using currette  to remove approx. ~28cm2 of nonviable tissue and slough  from  wound bed.   Pt tolerated well.

## 2022-01-17 ENCOUNTER — APPOINTMENT (OUTPATIENT)
Dept: PHYSICAL THERAPY | Facility: REHABILITATION | Age: 57
End: 2022-01-17
Attending: NURSE PRACTITIONER
Payer: COMMERCIAL

## 2022-01-17 ENCOUNTER — OFFICE VISIT (OUTPATIENT)
Dept: WOUND CARE | Facility: MEDICAL CENTER | Age: 57
End: 2022-01-17
Attending: NURSE PRACTITIONER
Payer: COMMERCIAL

## 2022-01-17 VITALS
DIASTOLIC BLOOD PRESSURE: 88 MMHG | RESPIRATION RATE: 16 BRPM | HEART RATE: 91 BPM | SYSTOLIC BLOOD PRESSURE: 143 MMHG | OXYGEN SATURATION: 96 % | TEMPERATURE: 97.5 F

## 2022-01-17 DIAGNOSIS — S81.801A OPEN WOUND OF RIGHT LOWER EXTREMITY, INITIAL ENCOUNTER: Primary | ICD-10-CM

## 2022-01-17 PROCEDURE — 11042 DBRDMT SUBQ TIS 1ST 20SQCM/<: CPT | Performed by: NURSE PRACTITIONER

## 2022-01-17 PROCEDURE — 16020 DRESS/DEBRID P-THICK BURN S: CPT | Performed by: NURSE PRACTITIONER

## 2022-01-17 PROCEDURE — 11045 DBRDMT SUBQ TISS EACH ADDL: CPT

## 2022-01-17 PROCEDURE — 11042 DBRDMT SUBQ TIS 1ST 20SQCM/<: CPT

## 2022-01-17 PROCEDURE — 99214 OFFICE O/P EST MOD 30 MIN: CPT

## 2022-01-17 PROCEDURE — 11045 DBRDMT SUBQ TISS EACH ADDL: CPT | Performed by: NURSE PRACTITIONER

## 2022-01-17 ASSESSMENT — ENCOUNTER SYMPTOMS
WHEEZING: 0
NAUSEA: 0
DIZZINESS: 0
ROS SKIN COMMENTS: FULL-THICKNESS WOUND RIGHT ANTERIOR LOWER EXTREMITY
HEADACHES: 0
VOMITING: 0
PALPITATIONS: 0
DIARRHEA: 0
DOUBLE VISION: 0
COUGH: 0
CHILLS: 0
BLURRED VISION: 0
CONSTIPATION: 0
SHORTNESS OF BREATH: 0
FEVER: 0

## 2022-01-17 ASSESSMENT — PAIN SCALES - GENERAL: PAINLEVEL: 9=SEVERE PAIN

## 2022-01-17 NOTE — PATIENT INSTRUCTIONS
-Keep your wound dressing clean, dry, and intact.    -Change your dressing if it becomes soiled, soaked, or falls off.    --Should you experience any significant changes in your wound(s), such as infection (redness, swelling, localized heat, increased pain, fever > 101 F, chills) or have any questions regarding your home care instructions, please contact the wound center at [phone]. If after hours, contact your primary care physician or go to the hospital emergency room.

## 2022-01-17 NOTE — PROGRESS NOTES
"Provider Encounter- Full Thickness wound    HISTORY OF PRESENT ILLNESS  Wound History:   START OF CARE IN CLINIC: 1/11/2020    REFERRING PROVIDER: LEORA Vaz   WOUND- Full Thickness Wound   LOCATION: Right anterior lower extremity   HISTORY:Karine is a 56-year-old female who presented to [facility] on 12/30/2021.  During that admission she reports that she burned her right anterior shin on a space heater.  The burn developed into a blister that ruptured the following day.  She originally went to urgent care for treatment she states that at the urgent care they told her her leg required an ultrasound to assess for possible abscess.  She then contacted her primary care physician who directed her to the emergency department for possible debridement and IV antibiotics.  She was subsequently admitted for a IV antibiotic treatment she was given Unasyn through the midline.  An x-ray was performed which was negative for any osseous abnormalities.   She was seen by the inpatient wound care team who recommended referral to [facility] as an outpatient. Of note patient has chronic lymphedema which contributes to her right lower extremity edema.  Additionally, the patient is seen by [facility] [place] for pain management.      Past Medical History:   Diagnosis Date   • Administrative encounter- RTC ACCESS/ADA PARATRANSIT ELIGIBILITY 6/4/2019   • Anemia    • Arthritis     osteo/hips and back   • At risk for falls    • Chronic back pain    • Dental disorder     lower denture   • Pain 12/04/2018    \"ALL OVER\", 10/10   • Pain 02/04/2019    arthritic pain   • Urinary incontinence     using pads         TOBACCO USE: Patient denies history of tobacco or smokeless tobacco use.      Patient's problem list, allergies, and current medications reviewed and updated in Epic    Interval History:  1/17/2022: Clinic visit with LEORA Trivedi.  Remainder of necrotic eschar tissue removed in clinic today through debridement with " a curette.  Patient was able to tolerate the procedure no complaints of discomfort.  Discussed with the patient proper wound management and when to change the dressing.  Patient to return to the clinic in 1 week for follow-up assessment.      REVIEW OF SYSTEMS:   Review of Systems   Constitutional: Negative for chills and fever.   HENT: Negative for hearing loss.    Eyes: Negative for blurred vision and double vision.   Respiratory: Negative for cough, shortness of breath and wheezing.    Cardiovascular: Positive for leg swelling. Negative for chest pain and palpitations.   Gastrointestinal: Negative for constipation, diarrhea, nausea and vomiting.   Skin:        Full-thickness wound right anterior lower extremity   Neurological: Negative for dizziness and headaches.       PHYSICAL EXAMINATION:   /88   Pulse 91   Temp 36.4 °C (97.5 °F) (Temporal)   Resp 16   LMP  (LMP Unknown)   SpO2 96%     Physical Exam  Constitutional:       Appearance: Normal appearance. She is obese.   HENT:      Head: Normocephalic.   Eyes:      Pupils: Pupils are equal, round, and reactive to light.   Cardiovascular:      Rate and Rhythm: Normal rate.      Pulses: Normal pulses.   Pulmonary:      Effort: Pulmonary effort is normal. No respiratory distress.      Breath sounds: No wheezing.   Musculoskeletal:         General: Swelling present.      Right lower leg: Edema present.      Left lower leg: Edema present.      Comments: 3+ edema to the right lower extremity right greater than left left 2+   Neurological:      General: No focal deficit present.      Mental Status: She is alert and oriented to person, place, and time.   Psychiatric:         Mood and Affect: Mood normal.         WOUND ASSESSMENT  Wound 12/26/21 Burn Leg Lower;Anterior Right (Active)   Number of days: 22       Wound 01/11/22 Right Anterior LE (Active)   Wound Image    01/17/22 0900   Site Assessment Red;Yellow;Boggy;Slough 01/17/22 0900   Periwound Assessment  Pink;Maceration 01/17/22 0900   Margins Attached edges 01/17/22 0900   Drainage Amount Large 01/17/22 0900   Drainage Description Serosanguineous 01/17/22 0900   Treatments Cleansed;Topical Lidocaine;Provider debridement 01/17/22 0900   Wound Cleansing Puracyn [place]enton 01/17/22 0900   Periwound Protectant Skin Protectant Wipes to Periwound;Barrier Paste 01/17/22 0900   Dressing Cleansing/Solutions Not Applicable 01/17/22 0900   Dressing Options Hydrofiber Silver;Silicone Adhesive Foam;Tubigrip 01/17/22 0900   Dressing Changed Changed 01/17/22 0900   Dressing Status Clean;Dry;Intact 01/17/22 0900   Dressing Change/Treatment Frequency Every 72 hrs, and As Needed 01/17/22 0900   Non-staged Wound Description Full thickness 01/17/22 0900   Wound Length (cm) 7.2 cm 01/17/22 0900   Wound Width (cm) 3.9 cm 01/17/22 0900   Wound Depth (cm) 0.2 cm 01/17/22 0900   Wound Surface Area (cm^2) 28.08 cm^2 01/17/22 0900   Wound Volume (cm^3) 5.616 cm^3 01/17/22 0900   Post-Procedure Length (cm) 7.4 cm 01/17/22 0900   Post-Procedure Width (cm) 4.2 cm 01/17/22 0900   Post-Procedure Depth (cm) 0.3 cm 01/17/22 0900   Post-Procedure Surface Area (cm^2) 31.08 cm^2 01/17/22 0900   Post-Procedure Volume (cm^3) 9.324 cm^3 01/17/22 0900   Wound Healing % 0 01/17/22 0900   Tunneling (cm) 0 cm 01/17/22 0900   Undermining (cm) 0 cm 01/17/22 0900   Wound Odor None 01/17/22 0900   Exposed Structures None 01/17/22 0900   Number of days: 6       Wound 01/11/22 Right Anteromedial LE (Active)   Wound Image    01/17/22 0900   Site Assessment Red;Yellow 01/17/22 0900   Periwound Assessment Clean;Dry;Intact 01/17/22 0900   Margins Attached edges 01/17/22 0900   Drainage Amount Moderate 01/17/22 0900   Drainage Description Serosanguineous 01/17/22 0900   Treatments Cleansed;Topical Lidocaine;Provider debridement 01/17/22 0900   Wound Cleansing Puracyn Allenton 01/17/22 0900   Periwound Protectant Skin Protectant Wipes to Periwound;Barrier Paste 01/17/22 0900    Dressing Cleansing/Solutions Not Applicable 01/17/22 0900   Dressing Options Hydrofiber Silver;Silicone Adhesive Foam;Tubigrip 01/17/22 0900   Dressing Changed Changed 01/17/22 0900   Dressing Status Clean;Dry;Intact 01/17/22 0900   Dressing Change/Treatment Frequency Every 72 hrs, and As Needed 01/17/22 0900   Non-staged Wound Description Full thickness 01/17/22 0900   Wound Length (cm) 1.2 cm 01/17/22 0900   Wound Width (cm) 1 cm 01/17/22 0900   Wound Depth (cm) 0.1 cm 01/17/22 0900   Wound Surface Area (cm^2) 1.2 cm^2 01/17/22 0900   Wound Volume (cm^3) 0.12 cm^3 01/17/22 0900   Post-Procedure Length (cm) 1.2 cm 01/17/22 0900   Post-Procedure Width (cm) 1.1 cm 01/17/22 0900   Post-Procedure Depth (cm) 0.1 cm 01/17/22 0900   Post-Procedure Surface Area (cm^2) 1.32 cm^2 01/17/22 0900   Post-Procedure Volume (cm^3) 0.132 cm^3 01/17/22 0900   Wound Healing % 16 01/17/22 0900   Tunneling (cm) 0 cm 01/17/22 0900   Undermining (cm) 0 cm 01/17/22 0900   Wound Odor None 01/17/22 0900   Exposed Structures None 01/17/22 0900   Number of days: 6       PROCEDURE:   -2% viscous lidocaine applied topically to wound bed for approximately 5 minutes prior to debridement  -Curette used to debride wound bed.  Excisional debridement was performed to remove devitalized tissue until healthy, bleeding tissue was visualized.   Entire surface of wound, 32.40 cm2 debrided.  Tissue debrided into the subcutaneous layer.    -Bleeding controlled with manual pressure.    -Wound care completed by wound RN, refer to flowsheet  -Patient tolerated the procedure well, without c/o pain or discomfort.       Pertinent Labs and Diagnostics:    Labs:     A1c:   Lab Results   Component Value Date/Time    HBA1C 5.1 07/26/2021 11:14 AM          IMAGING: No results found.    VASCULAR STUDIES: No results found.    LAST  WOUND CULTURE:   Lab Results   Component Value Date/Time    CULTRSULT No growth after 5 days of incubation. 12/31/2021 12:50 AM               ASSESSMENT AND PLAN:   1/17/2020  1. Open wound of right lower extremity, initial encounter  -Excisional debridement of wound in clinic today, medically necessary to promote wound healing.  -Patient to return to clinic weekly for assessment and debridement  -Patient to change dressing 1-2 times per week in between clinic visits or as needed for strikethrough on the dressing  -Patient has as needed Lasix instructed to take to reduce 3+ edema to right lower extremity.  Patient to wear 2 Tubigrip's throughout the day may remove at night when recumbent.  -She was instructed to elevate her leg for approximately 45 minutes 3 times a day to help decrease bilateral lower extremity edema.  -No signs or symptoms of infection this clinic visit.  Patient has completed a course of oral antibiotics.   Wound care: Sink patient's, Hydrofiber silver x2, silicone adhesive foam, 2 Tubigrip            PATIENT EDUCATION  - Importance of adequate nutrition for wound healing  -Advised to go to ER for any increased redness, swelling, drainage, or odor, or if patient develops fever, chills, nausea or vomiting.     30 min spent face to face with patient, >50% of time spent counseling, coordinating care, reviewing records, discussing POC, educating patient regarding wound healing and progression.  This time was spent in excess to procedure time.       Please note that this note may have been created using voice recognition software. I have worked with technical experts from [employer] to optimize the interface.  I have made every reasonable attempt to correct obvious errors, but there may be errors of grammar and possibly content that I did not discover before finalizing the note.

## 2022-01-24 ENCOUNTER — APPOINTMENT (OUTPATIENT)
Dept: PHYSICAL THERAPY | Facility: REHABILITATION | Age: 57
End: 2022-01-24
Attending: NURSE PRACTITIONER
Payer: COMMERCIAL

## 2022-01-24 ENCOUNTER — OFFICE VISIT (OUTPATIENT)
Dept: WOUND CARE | Facility: MEDICAL CENTER | Age: 57
End: 2022-01-24
Attending: NURSE PRACTITIONER
Payer: COMMERCIAL

## 2022-01-24 VITALS
TEMPERATURE: 97.5 F | OXYGEN SATURATION: 90 % | SYSTOLIC BLOOD PRESSURE: 159 MMHG | DIASTOLIC BLOOD PRESSURE: 93 MMHG | HEART RATE: 95 BPM | RESPIRATION RATE: 18 BRPM

## 2022-01-24 DIAGNOSIS — I89.0 LYMPHEDEMA: ICD-10-CM

## 2022-01-24 DIAGNOSIS — S81.801A OPEN WOUND OF RIGHT LOWER EXTREMITY, INITIAL ENCOUNTER: Primary | ICD-10-CM

## 2022-01-24 PROCEDURE — 11042 DBRDMT SUBQ TIS 1ST 20SQCM/<: CPT | Performed by: NURSE PRACTITIONER

## 2022-01-24 PROCEDURE — 16020 DRESS/DEBRID P-THICK BURN S: CPT | Performed by: NURSE PRACTITIONER

## 2022-01-24 PROCEDURE — 11042 DBRDMT SUBQ TIS 1ST 20SQCM/<: CPT

## 2022-01-24 PROCEDURE — 99213 OFFICE O/P EST LOW 20 MIN: CPT

## 2022-01-24 PROCEDURE — 11045 DBRDMT SUBQ TISS EACH ADDL: CPT | Performed by: NURSE PRACTITIONER

## 2022-01-24 PROCEDURE — 99213 OFFICE O/P EST LOW 20 MIN: CPT | Mod: 25 | Performed by: NURSE PRACTITIONER

## 2022-01-24 PROCEDURE — 11045 DBRDMT SUBQ TISS EACH ADDL: CPT

## 2022-01-24 ASSESSMENT — ENCOUNTER SYMPTOMS
WHEEZING: 0
NAUSEA: 0
CONSTIPATION: 0
DIZZINESS: 0
VOMITING: 0
DOUBLE VISION: 0
PALPITATIONS: 0
CHILLS: 0
HEADACHES: 0
COUGH: 0
SHORTNESS OF BREATH: 0
BLURRED VISION: 0
FEVER: 0
DIARRHEA: 0
ROS SKIN COMMENTS: FULL-THICKNESS WOUND RIGHT ANTERIOR LOWER EXTREMITY

## 2022-01-24 NOTE — PROGRESS NOTES
"Provider Encounter- Full Thickness wound    HISTORY OF PRESENT ILLNESS  Wound History:   START OF CARE IN CLINIC: 1/11/2020    REFERRING PROVIDER: LEORA Vaz   WOUND- Full Thickness Wound   LOCATION: Right anterior lower extremity   HISTORY:Karine is a 56-year-old female who presented to [facility] on 12/30/2021.  During that admission she reports that she burned her right anterior shin on a space heater.  The burn developed into a blister that ruptured the following day.  She originally went to urgent care for treatment she states that at the urgent care they told her her leg required an ultrasound to assess for possible abscess.  She then contacted her primary care physician who directed her to the emergency department for possible debridement and IV antibiotics.  She was subsequently admitted for a IV antibiotic treatment she was given Unasyn through the midline.  An x-ray was performed which was negative for any osseous abnormalities.   She was seen by the inpatient wound care team who recommended referral to [facility] as an outpatient. Of note patient has chronic lymphedema which contributes to her right lower extremity edema.  Additionally, the patient is seen by [facility] [place] for pain management.      Past Medical History:   Diagnosis Date   • Administrative encounter- RTC ACCESS/ADA PARATRANSIT ELIGIBILITY 6/4/2019   • Anemia    • Arthritis     osteo/hips and back   • At risk for falls    • Chronic back pain    • Dental disorder     lower denture   • Pain 12/04/2018    \"ALL OVER\", 10/10   • Pain 02/04/2019    arthritic pain   • Urinary incontinence     using pads         TOBACCO USE: Patient denies history of tobacco or smokeless tobacco use.      Patient's problem list, allergies, and current medications reviewed and updated in Epic    Interval History:  1/17/2022: Clinic visit with LEORA Trivedi.  Remainder of necrotic eschar tissue removed in clinic today through debridement with " a curette.  Patient was able to tolerate the procedure no complaints of discomfort.  Discussed with the patient proper wound management and when to change the dressing.  Patient to return to the clinic in 1 week for follow-up assessment.    1/24/2022: Clinic visit with LEORA Trivedi. Patient instructed to take her diuretic as prescribed.  Patient reports that she is not taking her diuretic daily.  She has increased bilateral lower extremity edema which contributes to delay in her right anterior lower extremity ulcers.  Patient also instructed that she should elevate her leg 3 times a day for 45 minutes above the level of her heart.  Furthermore, patient was instructed that she should wear 2 layer compression to help mobilize right lower extremity edema.  Patient has known lymphedema further contributing to her bilateral extremity edema.  Discussed with the patient increasing to a 2 layer compression wrap however at this time the patient has an excessive amount of drainage coming from the wound bed and would need to come to the clinic twice weekly which she is unable to do at this time due to her work schedule.      REVIEW OF SYSTEMS:   Review of Systems   Constitutional: Negative for chills and fever.   HENT: Negative for hearing loss.    Eyes: Negative for blurred vision and double vision.   Respiratory: Negative for cough, shortness of breath and wheezing.    Cardiovascular: Positive for leg swelling. Negative for chest pain and palpitations.   Gastrointestinal: Negative for constipation, diarrhea, nausea and vomiting.   Skin:        Full-thickness wound right anterior lower extremity   Neurological: Negative for dizziness and headaches.       PHYSICAL EXAMINATION:   /93 (BP Location: Right arm, Patient Position: Sitting)   Pulse 95   Temp 36.4 °C (97.5 °F) (Temporal)   Resp 18   LMP  (LMP Unknown)   SpO2 90%     Physical Exam  Constitutional:       Appearance: Normal appearance. She is obese.    HENT:      Head: Normocephalic.   Eyes:      Pupils: Pupils are equal, round, and reactive to light.   Cardiovascular:      Rate and Rhythm: Normal rate.      Pulses: Normal pulses.   Pulmonary:      Effort: Pulmonary effort is normal. No respiratory distress.      Breath sounds: No wheezing.   Musculoskeletal:         General: Swelling present.      Right lower leg: Edema present.      Left lower leg: Edema present.      Comments: 3+ edema to the right lower extremity right greater than left left 2+   Neurological:      General: No focal deficit present.      Mental Status: She is alert and oriented to person, place, and time.   Psychiatric:         Mood and Affect: Mood normal.         WOUND ASSESSMENT  Wound 12/26/21 Burn Leg Lower;Anterior Right (Active)   Number of days: 29       Wound 01/11/22 Right Anterior LE (Active)   Wound Image    01/24/22 0815   Site Assessment Red;Yellow;Slough 01/24/22 0815   Periwound Assessment Scar tissue;Edema 01/24/22 0815   Margins Attached edges 01/24/22 0815   Drainage Amount Large 01/24/22 0815   Drainage Description Serosanguineous 01/24/22 0815   Treatments Cleansed;Topical Lidocaine;Provider debridement;Site care 01/24/22 0815   Wound Cleansing Puracyn Spray 01/24/22 0815   Periwound Protectant Skin Protectant Wipes to Periwound;Barrier Paste 01/24/22 0815   Dressing Cleansing/Solutions Not Applicable 01/24/22 0815   Dressing Options Hydrofiber Silver;Silicone Adhesive Foam;Tubigrip 01/24/22 0815   Dressing Changed Changed 01/17/22 0900   Dressing Status Clean;Dry;Intact 01/17/22 0900   Dressing Change/Treatment Frequency Every 72 hrs, and As Needed 01/17/22 0900   Non-staged Wound Description Full thickness 01/24/22 0815   Wound Length (cm) 7 cm 01/24/22 0815   Wound Width (cm) 4 cm 01/24/22 0815   Wound Depth (cm) 1.3 cm 01/24/22 0815   Wound Surface Area (cm^2) 28 cm^2 01/24/22 0815   Wound Volume (cm^3) 36.4 cm^3 01/24/22 0815   Post-Procedure Length (cm) 7.2 cm  01/24/22 0815   Post-Procedure Width (cm) 4.5 cm 01/24/22 0815   Post-Procedure Depth (cm) 1.3 cm 01/24/22 0815   Post-Procedure Surface Area (cm^2) 32.4 cm^2 01/24/22 0815   Post-Procedure Volume (cm^3) 42.12 cm^3 01/24/22 0815   Wound Healing % -548 01/24/22 0815   Wound Bed Granulation (%) 70 % 01/24/22 0815   Wound Bed Slough (%) 30 % 01/24/22 0815   Tunneling (cm) 0 cm 01/24/22 0815   Undermining (cm) 0 cm 01/24/22 0815   Wound Odor None 01/24/22 0815   Exposed Structures BRIGIDA 01/24/22 0815   Number of days: 13       Wound 01/11/22 Right Anteromedial LE (Active)   Wound Image    01/24/22 0815   Site Assessment Red;Yellow 01/24/22 0815   Periwound Assessment Scar tissue;Edema 01/24/22 0815   Margins Attached edges 01/24/22 0815   Drainage Amount Moderate 01/24/22 0815   Drainage Description Serosanguineous 01/24/22 0815   Treatments Cleansed;Topical Lidocaine;Provider debridement;Site care 01/24/22 0815   Wound Cleansing Puracyn Spray 01/24/22 0815   Periwound Protectant Skin Protectant Wipes to Periwound;Barrier Paste 01/24/22 0815   Dressing Cleansing/Solutions Not Applicable 01/24/22 0815   Dressing Options Hydrofiber Silver;Silicone Adhesive Foam;Tubigrip 01/24/22 0815   Dressing Changed Changed 01/17/22 0900   Dressing Status Clean;Dry;Intact 01/17/22 0900   Dressing Change/Treatment Frequency Every 72 hrs, and As Needed 01/17/22 0900   Non-staged Wound Description Full thickness 01/24/22 0815   Wound Length (cm) 1 cm 01/24/22 0815   Wound Width (cm) 1.1 cm 01/24/22 0815   Wound Depth (cm) 0.1 cm 01/24/22 0815   Wound Surface Area (cm^2) 1.1 cm^2 01/24/22 0815   Wound Volume (cm^3) 0.11 cm^3 01/24/22 0815   Post-Procedure Length (cm) 1.1 cm 01/24/22 0815   Post-Procedure Width (cm) 1.1 cm 01/24/22 0815   Post-Procedure Depth (cm) 0.1 cm 01/24/22 0815   Post-Procedure Surface Area (cm^2) 1.21 cm^2 01/24/22 0815   Post-Procedure Volume (cm^3) 0.121 cm^3 01/24/22 0815   Wound Healing % 23 01/24/22 0815   Wound  Bed Granulation (%) 80 % 01/24/22 0815   Wound Bed Slough (%) 20 % 01/24/22 0815   Tunneling (cm) 0 cm 01/24/22 0815   Undermining (cm) 0 cm 01/24/22 0815   Wound Odor None 01/24/22 0815   Exposed Structures None 01/24/22 0815   Number of days: 13       PROCEDURE:   -2% viscous lidocaine applied topically to wound bed for approximately 5 minutes prior to debridement  -Curette used to debride wound bed.  Excisional debridement was performed to remove devitalized tissue until healthy, bleeding tissue was visualized.   Entire surface of wound, 33.61 cm2 debrided.  Tissue debrided into the subcutaneous layer.    -Bleeding controlled with manual pressure.    -Wound care completed by wound RN, refer to flowsheet  -Patient tolerated the procedure well, without c/o pain or discomfort.       Pertinent Labs and Diagnostics:    Labs:     A1c:   Lab Results   Component Value Date/Time    HBA1C 5.1 07/26/2021 11:14 AM          IMAGING: No results found.    VASCULAR STUDIES: No results found.    LAST  WOUND CULTURE:   Lab Results   Component Value Date/Time    CULTRSULT No growth after 5 days of incubation. 12/31/2021 12:50 AM              ASSESSMENT AND PLAN:   1/24/2020  1. Open wound of right lower extremity, initial encounter  -Excisional debridement of wound in clinic today, medically necessary to promote wound healing.  -Patient to return to clinic weekly for assessment and debridement  -Patient to change dressing 1-2 times per week in between clinic visits or as needed for strikethrough on the dressing  -Patient instructed that she should take her diuretic daily as prescribed.  Patient reports that she has been taking her diuretic every other day.  -She was instructed to elevate her leg for approximately 45 minutes 3 times a day to help decrease bilateral lower extremity edema.  -No signs or symptoms of infection this clinic visit.     Wound care:  Hydrofiber silver x2, silicone adhesive foam, 2 Tubigrip    2.   Lymphedema  Comments: Patient with a known history of lymphedema treatment to bilateral lower extremity edema.  -Patient has her own lymphedema pumps patient instructed that she should try to use her pumps unless the pressure from the pumps causes her pain.  -Consider sending the patient to lymphedema specialist if we cannot reduce her right lower extremity now with the use of compression wraps.      PATIENT EDUCATION  - Importance of adequate nutrition for wound healing  -Advised to go to ER for any increased redness, swelling, drainage, or odor, or if patient develops fever, chills, nausea or vomiting.     20 min spent face to face with patient, >50% of time spent counseling, coordinating care, reviewing records, discussing POC, educating patient regarding wound healing and progression.  This time was spent in excess to procedure time.       Please note that this note may have been created using voice recognition software. I have worked with technical experts from [email or web address] to optimize the interface.  I have made every reasonable attempt to correct obvious errors, but there may be errors of grammar and possibly content that I did not discover before finalizing the note.

## 2022-01-24 NOTE — PATIENT INSTRUCTIONS
Avoid prolonged standing or sitting without elevating your legs.  - Apply tubigrip to your legs ending 2 fingers below back of knee without wrinkles.     Should you experience any significant changes in your wound(s), such as infection (redness, swelling, localized heat, increased pain, fever > 101 F, chills) or have any questions regarding your home care instructions, please contact the wound center at [phone]. If after hours, contact your primary care physician or go to the hospital emergency room.   Keep dressing clean, dry and covered while bathing. Only change dressing if it becomes over saturated, soiled or falls off.

## 2022-01-31 ENCOUNTER — NON-PROVIDER VISIT (OUTPATIENT)
Dept: WOUND CARE | Facility: MEDICAL CENTER | Age: 57
End: 2022-01-31
Attending: NURSE PRACTITIONER
Payer: COMMERCIAL

## 2022-01-31 ENCOUNTER — APPOINTMENT (OUTPATIENT)
Dept: PHYSICAL THERAPY | Facility: REHABILITATION | Age: 57
End: 2022-01-31
Attending: NURSE PRACTITIONER
Payer: COMMERCIAL

## 2022-01-31 DIAGNOSIS — I87.2 VENOUS INSUFFICIENCY: ICD-10-CM

## 2022-01-31 DIAGNOSIS — I83.891 VARICOSE VEINS OF RIGHT LEG WITH EDEMA: ICD-10-CM

## 2022-01-31 PROCEDURE — 97602 WOUND(S) CARE NON-SELECTIVE: CPT

## 2022-01-31 PROCEDURE — 16020 DRESS/DEBRID P-THICK BURN S: CPT

## 2022-01-31 NOTE — PATIENT INSTRUCTIONS
Avoid prolonged standing or sitting without elevating your legs.  - Apply tubigrip to your legs ending 2 fingers below back of knee without wrinkles.     Should you experience any significant changes in your wound(s), such as infection (redness, swelling, localized heat, increased pain, fever > 101 F, chills) or have any questions regarding your home care instructions, please contact the wound center at [phone]. If after hours, contact your primary care physician or go to the hospital emergency room.   Keep dressing clean, dry and covered while bathing. Only change dressing if it becomes over saturated, soiled or falls off or 1-2x per day as instructed.  Buy & apply zinc baby butt cream/desitin around wound site on intact skin.  Your supplies are ordered from [employer].

## 2022-02-01 DIAGNOSIS — M50.30 DDD (DEGENERATIVE DISC DISEASE), CERVICAL: ICD-10-CM

## 2022-02-01 DIAGNOSIS — M51.36 DDD (DEGENERATIVE DISC DISEASE), LUMBAR: ICD-10-CM

## 2022-02-01 RX ORDER — METHOCARBAMOL 500 MG/1
TABLET, FILM COATED ORAL
Qty: 236 TABLET | Refills: 0 | Status: SHIPPED | OUTPATIENT
Start: 2022-02-01 | End: 2022-02-28 | Stop reason: SDUPTHER
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
## 2022-02-01 NOTE — PROCEDURES
Non selective debridement to lower leg wounds with moist gauze to remove non-viable slough & biofilm layer.

## 2022-02-07 ENCOUNTER — HOSPITAL ENCOUNTER (OUTPATIENT)
Facility: MEDICAL CENTER | Age: 57
End: 2022-02-07
Attending: NURSE PRACTITIONER

## 2022-02-07 ENCOUNTER — OFFICE VISIT (OUTPATIENT)
Dept: WOUND CARE | Facility: MEDICAL CENTER | Age: 57
End: 2022-02-07
Attending: NURSE PRACTITIONER
Payer: COMMERCIAL

## 2022-02-07 VITALS
OXYGEN SATURATION: 97 % | RESPIRATION RATE: 18 BRPM | TEMPERATURE: 97.5 F | HEART RATE: 95 BPM | DIASTOLIC BLOOD PRESSURE: 101 MMHG | SYSTOLIC BLOOD PRESSURE: 154 MMHG

## 2022-02-07 DIAGNOSIS — I89.0 LYMPHEDEMA: ICD-10-CM

## 2022-02-07 DIAGNOSIS — S81.801A OPEN WOUND OF RIGHT LOWER EXTREMITY, INITIAL ENCOUNTER: Primary | ICD-10-CM

## 2022-02-07 LAB
GRAM STN SPEC: NORMAL
SIGNIFICANT IND 70042: NORMAL
SITE SITE: NORMAL
SOURCE SOURCE: NORMAL

## 2022-02-07 PROCEDURE — 99213 OFFICE O/P EST LOW 20 MIN: CPT | Mod: 25 | Performed by: NURSE PRACTITIONER

## 2022-02-07 PROCEDURE — 87205 SMEAR GRAM STAIN: CPT

## 2022-02-07 PROCEDURE — 99213 OFFICE O/P EST LOW 20 MIN: CPT

## 2022-02-07 PROCEDURE — 11042 DBRDMT SUBQ TIS 1ST 20SQCM/<: CPT | Performed by: NURSE PRACTITIONER

## 2022-02-07 PROCEDURE — 87077 CULTURE AEROBIC IDENTIFY: CPT | Mod: 91

## 2022-02-07 PROCEDURE — 11042 DBRDMT SUBQ TIS 1ST 20SQCM/<: CPT

## 2022-02-07 PROCEDURE — 11045 DBRDMT SUBQ TISS EACH ADDL: CPT | Performed by: NURSE PRACTITIONER

## 2022-02-07 PROCEDURE — 11045 DBRDMT SUBQ TISS EACH ADDL: CPT

## 2022-02-07 PROCEDURE — 87186 SC STD MICRODIL/AGAR DIL: CPT | Mod: 91

## 2022-02-07 PROCEDURE — 87070 CULTURE OTHR SPECIMN AEROBIC: CPT

## 2022-02-07 ASSESSMENT — ENCOUNTER SYMPTOMS
BLURRED VISION: 0
COUGH: 0
WHEEZING: 0
CHILLS: 0
PALPITATIONS: 0
HEADACHES: 0
DIZZINESS: 0
ROS SKIN COMMENTS: FULL-THICKNESS WOUND RIGHT ANTERIOR LOWER EXTREMITY
DIARRHEA: 0
NAUSEA: 0
FEVER: 0
SHORTNESS OF BREATH: 0
DOUBLE VISION: 0
VOMITING: 0
CONSTIPATION: 0
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
## 2022-02-08 ENCOUNTER — TELEPHONE (OUTPATIENT)
Dept: WOUND CARE | Facility: MEDICAL CENTER | Age: 57
End: 2022-02-08

## 2022-02-08 NOTE — TELEPHONE ENCOUNTER
Karine returned call, inquiring whether she will be able to make 3 times a week appointments. She states next week she has mandatory overtime at work and will be unable to come except for Monday but should be able to come 3 x a week starting 2/21/22. PARs to confirm appointment times with patient.

## 2022-02-08 NOTE — NON-PROVIDER
Left a Voice message to have Pt return call regarding her wound vac and cancelling all her appointments for text week.

## 2022-02-09 LAB
BACTERIA WND AEROBE CULT: ABNORMAL
GRAM STN SPEC: ABNORMAL
SIGNIFICANT IND 70042: ABNORMAL
SITE SITE: ABNORMAL
SOURCE SOURCE: ABNORMAL

## 2022-02-14 ENCOUNTER — OFFICE VISIT (OUTPATIENT)
Dept: WOUND CARE | Facility: MEDICAL CENTER | Age: 57
End: 2022-02-14
Attending: NURSE PRACTITIONER
Payer: COMMERCIAL

## 2022-02-14 VITALS
RESPIRATION RATE: 18 BRPM | OXYGEN SATURATION: 94 % | SYSTOLIC BLOOD PRESSURE: 153 MMHG | DIASTOLIC BLOOD PRESSURE: 93 MMHG | TEMPERATURE: 97.2 F | HEART RATE: 99 BPM

## 2022-02-14 DIAGNOSIS — I89.0 LYMPHEDEMA: ICD-10-CM

## 2022-02-14 DIAGNOSIS — S81.801D OPEN WOUND OF RIGHT LOWER EXTREMITY, SUBSEQUENT ENCOUNTER: ICD-10-CM

## 2022-02-14 DIAGNOSIS — R89.5 POSITIVE CULTURE FINDINGS IN WOUND: ICD-10-CM

## 2022-02-14 PROCEDURE — 11042 DBRDMT SUBQ TIS 1ST 20SQCM/<: CPT | Performed by: STUDENT IN AN ORGANIZED HEALTH CARE EDUCATION/TRAINING PROGRAM

## 2022-02-14 PROCEDURE — 99213 OFFICE O/P EST LOW 20 MIN: CPT | Mod: 25 | Performed by: STUDENT IN AN ORGANIZED HEALTH CARE EDUCATION/TRAINING PROGRAM

## 2022-02-14 PROCEDURE — 11042 DBRDMT SUBQ TIS 1ST 20SQCM/<: CPT

## 2022-02-14 PROCEDURE — 11045 DBRDMT SUBQ TISS EACH ADDL: CPT

## 2022-02-14 PROCEDURE — 99213 OFFICE O/P EST LOW 20 MIN: CPT

## 2022-02-14 PROCEDURE — 11045 DBRDMT SUBQ TISS EACH ADDL: CPT | Performed by: STUDENT IN AN ORGANIZED HEALTH CARE EDUCATION/TRAINING PROGRAM

## 2022-02-14 RX ORDER — ALBUTEROL SULFATE 90 UG/1
1 AEROSOL, METERED RESPIRATORY (INHALATION)
COMMUNITY
Start: 2022-02-01 | End: 2022-04-03

## 2022-02-14 ASSESSMENT — ENCOUNTER SYMPTOMS
DIZZINESS: 0
DIARRHEA: 0
FEVER: 0
NAUSEA: 0
BLURRED VISION: 0
SHORTNESS OF BREATH: 0
CHILLS: 0
DOUBLE VISION: 0
COUGH: 0
HEADACHES: 0
ROS SKIN COMMENTS: FULL-THICKNESS WOUND RIGHT ANTERIOR LOWER EXTREMITY
CONSTIPATION: 0
WHEEZING: 0
VOMITING: 0
PALPITATIONS: 0
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
## 2022-02-14 NOTE — PATIENT INSTRUCTIONS
-Keep your wound dressing clean, dry, and intact.    -Change your dressing if it becomes soiled, soaked, or falls off.    -Remove your compression wrap if you have severe pain, severe swelling, numbness, color change, or temperature change in your toes. If you need to remove your compression wrap, do so by unrolling it. Do not cut the compression wrap off to prevent cutting yourself on accident.    --Should you experience any significant changes in your wound(s), such as infection (redness, swelling, localized heat, increased pain, fever > 101 F, chills) or have any questions regarding your home care instructions, please contact the wound center at [phone]. If after hours, contact your primary care physician or go to the hospital emergency room.

## 2022-02-21 ENCOUNTER — OFFICE VISIT (OUTPATIENT)
Dept: WOUND CARE | Facility: MEDICAL CENTER | Age: 57
End: 2022-02-21
Attending: NURSE PRACTITIONER
Payer: COMMERCIAL

## 2022-02-21 VITALS
DIASTOLIC BLOOD PRESSURE: 70 MMHG | SYSTOLIC BLOOD PRESSURE: 133 MMHG | RESPIRATION RATE: 18 BRPM | TEMPERATURE: 98.7 F | OXYGEN SATURATION: 89 % | HEART RATE: 100 BPM

## 2022-02-21 DIAGNOSIS — S81.801D OPEN WOUND OF RIGHT LOWER EXTREMITY, SUBSEQUENT ENCOUNTER: Primary | ICD-10-CM

## 2022-02-21 DIAGNOSIS — R89.5 POSITIVE CULTURE FINDINGS IN WOUND: ICD-10-CM

## 2022-02-21 DIAGNOSIS — I89.0 LYMPHEDEMA: ICD-10-CM

## 2022-02-21 PROCEDURE — 11045 DBRDMT SUBQ TISS EACH ADDL: CPT

## 2022-02-21 PROCEDURE — 11042 DBRDMT SUBQ TIS 1ST 20SQCM/<: CPT

## 2022-02-21 PROCEDURE — 99213 OFFICE O/P EST LOW 20 MIN: CPT | Mod: 25 | Performed by: NURSE PRACTITIONER

## 2022-02-21 PROCEDURE — 99213 OFFICE O/P EST LOW 20 MIN: CPT

## 2022-02-21 PROCEDURE — 11042 DBRDMT SUBQ TIS 1ST 20SQCM/<: CPT | Performed by: NURSE PRACTITIONER

## 2022-02-21 PROCEDURE — 11045 DBRDMT SUBQ TISS EACH ADDL: CPT | Performed by: NURSE PRACTITIONER

## 2022-02-21 ASSESSMENT — ENCOUNTER SYMPTOMS
CONSTIPATION: 0
BLURRED VISION: 0
FEVER: 0
DOUBLE VISION: 0
HEADACHES: 0
SHORTNESS OF BREATH: 0
CHILLS: 0
NAUSEA: 0
COUGH: 0
VOMITING: 0
WHEEZING: 0
ROS SKIN COMMENTS: FULL-THICKNESS WOUND RIGHT ANTERIOR LOWER EXTREMITY
DIARRHEA: 0
DIZZINESS: 0
PALPITATIONS: 0

## 2022-02-21 NOTE — PATIENT INSTRUCTIONS
-Keep your wound dressing clean, dry, and intact.    -Remove your compression wrap if you have severe pain, severe swelling, numbness, color change, or temperature change in your toes. If you need to remove your compression wrap, do so by unrolling it. Do not cut the compression wrap off to prevent cutting yourself on accident.    -Should you experience any significant changes in your wound(s), such as infection (redness, swelling, localized heat, increased pain, fever > 101 F, chills) or have any questions regarding your home care instructions, please contact the wound center at [phone]. If after hours, contact your primary care physician or go to the hospital emergency room.
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
## 2022-02-28 ENCOUNTER — OFFICE VISIT (OUTPATIENT)
Dept: MEDICAL GROUP | Age: 57
End: 2022-02-28

## 2022-02-28 ENCOUNTER — OFFICE VISIT (OUTPATIENT)
Dept: WOUND CARE | Facility: MEDICAL CENTER | Age: 57
End: 2022-02-28
Attending: NURSE PRACTITIONER
Payer: COMMERCIAL

## 2022-02-28 VITALS
DIASTOLIC BLOOD PRESSURE: 74 MMHG | OXYGEN SATURATION: 97 % | HEART RATE: 94 BPM | HEIGHT: 60 IN | SYSTOLIC BLOOD PRESSURE: 130 MMHG | TEMPERATURE: 98.5 F | WEIGHT: 228 LBS | BODY MASS INDEX: 44.76 KG/M2

## 2022-02-28 VITALS
RESPIRATION RATE: 18 BRPM | OXYGEN SATURATION: 93 % | HEART RATE: 109 BPM | SYSTOLIC BLOOD PRESSURE: 127 MMHG | TEMPERATURE: 97 F | DIASTOLIC BLOOD PRESSURE: 74 MMHG

## 2022-02-28 DIAGNOSIS — I89.0 LYMPHEDEMA: ICD-10-CM

## 2022-02-28 DIAGNOSIS — E87.6 HYPOKALEMIA: ICD-10-CM

## 2022-02-28 DIAGNOSIS — M50.30 DDD (DEGENERATIVE DISC DISEASE), CERVICAL: ICD-10-CM

## 2022-02-28 DIAGNOSIS — M51.36 DDD (DEGENERATIVE DISC DISEASE), LUMBAR: ICD-10-CM

## 2022-02-28 DIAGNOSIS — E66.9 OBESITY (BMI 30-39.9): ICD-10-CM

## 2022-02-28 DIAGNOSIS — Z12.12 SCREENING FOR COLORECTAL CANCER: ICD-10-CM

## 2022-02-28 DIAGNOSIS — R89.5 POSITIVE CULTURE FINDINGS IN WOUND: ICD-10-CM

## 2022-02-28 DIAGNOSIS — Z12.11 SCREENING FOR COLORECTAL CANCER: ICD-10-CM

## 2022-02-28 DIAGNOSIS — S81.801D OPEN WOUND OF RIGHT LOWER EXTREMITY, SUBSEQUENT ENCOUNTER: Primary | ICD-10-CM

## 2022-02-28 DIAGNOSIS — R60.9 EDEMA, UNSPECIFIED TYPE: ICD-10-CM

## 2022-02-28 PROCEDURE — 11045 DBRDMT SUBQ TISS EACH ADDL: CPT

## 2022-02-28 PROCEDURE — 99213 OFFICE O/P EST LOW 20 MIN: CPT

## 2022-02-28 PROCEDURE — 11042 DBRDMT SUBQ TIS 1ST 20SQCM/<: CPT

## 2022-02-28 PROCEDURE — 99213 OFFICE O/P EST LOW 20 MIN: CPT | Mod: 25 | Performed by: NURSE PRACTITIONER

## 2022-02-28 PROCEDURE — 11045 DBRDMT SUBQ TISS EACH ADDL: CPT | Performed by: NURSE PRACTITIONER

## 2022-02-28 PROCEDURE — 11042 DBRDMT SUBQ TIS 1ST 20SQCM/<: CPT | Performed by: NURSE PRACTITIONER

## 2022-02-28 PROCEDURE — 99214 OFFICE O/P EST MOD 30 MIN: CPT | Performed by: INTERNAL MEDICINE

## 2022-02-28 PROCEDURE — 97605 NEG PRS WND THER DME<=50SQCM: CPT

## 2022-02-28 RX ORDER — METHOCARBAMOL 500 MG/1
1000 TABLET, FILM COATED ORAL 4 TIMES DAILY PRN
Qty: 240 TABLET | Refills: 5 | Status: SHIPPED | OUTPATIENT
Start: 2022-02-28 | End: 2022-04-25 | Stop reason: SDUPTHER

## 2022-02-28 RX ORDER — FUROSEMIDE 40 MG/1
40 TABLET ORAL DAILY
Qty: 90 TABLET | Refills: 4 | Status: SHIPPED | OUTPATIENT
Start: 2022-02-28 | End: 2022-10-29 | Stop reason: SDUPTHER

## 2022-02-28 RX ORDER — PHENTERMINE HYDROCHLORIDE 37.5 MG/1
37.5 CAPSULE ORAL EVERY MORNING
Qty: 90 CAPSULE | Refills: 0 | Status: ON HOLD | OUTPATIENT
Start: 2022-05-28 | End: 2022-04-11

## 2022-02-28 RX ORDER — PHENTERMINE HYDROCHLORIDE 37.5 MG/1
37.5 CAPSULE ORAL EVERY MORNING
Qty: 90 CAPSULE | Refills: 0 | Status: SHIPPED | OUTPATIENT
Start: 2022-02-28 | End: 2022-05-31

## 2022-02-28 RX ORDER — POTASSIUM CHLORIDE 20 MEQ/1
20 TABLET, EXTENDED RELEASE ORAL DAILY
Qty: 90 TABLET | Refills: 4 | Status: SHIPPED | OUTPATIENT
Start: 2022-02-28 | End: 2022-08-29

## 2022-02-28 ASSESSMENT — ENCOUNTER SYMPTOMS
NEUROLOGICAL NEGATIVE: 1
GASTROINTESTINAL NEGATIVE: 1
EYES NEGATIVE: 1
RESPIRATORY NEGATIVE: 1
CONSTIPATION: 0
PSYCHIATRIC NEGATIVE: 1
DIZZINESS: 0
VOMITING: 0
ROS SKIN COMMENTS: FULL-THICKNESS WOUND RIGHT ANTERIOR LOWER EXTREMITY
PALPITATIONS: 0
CONSTITUTIONAL NEGATIVE: 1
BLURRED VISION: 0
COUGH: 0
NAUSEA: 0
FEVER: 0
MUSCULOSKELETAL NEGATIVE: 1
DOUBLE VISION: 0
WHEEZING: 0
DIARRHEA: 0
SHORTNESS OF BREATH: 0
CHILLS: 0
CARDIOVASCULAR NEGATIVE: 1
HEADACHES: 0

## 2022-02-28 ASSESSMENT — PATIENT HEALTH QUESTIONNAIRE - PHQ9: CLINICAL INTERPRETATION OF PHQ2 SCORE: 0

## 2022-02-28 ASSESSMENT — FIBROSIS 4 INDEX: FIB4 SCORE: 1.096460826971710805

## 2022-02-28 NOTE — NON-PROVIDER
Wound Vac initial application.  Detailed instructions given both verbally and in writing and pt states understanding.  We discussed importance of changing vac 3 times per week and being on time to appointments.
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
## 2022-02-28 NOTE — PROGRESS NOTES
Subjective     Karine Ovalle is a 56 y.o. female who presents with Medication Refill (Phentermine refill )  The patient is here for followup of chronic medical problems listed below. The patient is compliant with medications and having no side effects from them. Denies chest pain, abdominal pain, dyspnea, myalgias, or cough.   Patient Active Problem List    Diagnosis Date Noted   • Cellulitis 12/31/2021   • Varicose veins of right leg with edema 08/09/2021   • Administrative [id number] [facility] paperwork for workplace accommodation for chronic DDD and DJD hips and shoulders bilaterally status post multiple joint replacements 06/23/2021   • Primary osteoarthritis of both knees- sp right TKR 2015; left TKR tbd 2021 or 2022- dr nowak 06/04/2019   • S/P hip replacement, bilateral 02/13/2019   • Primary insomnia 01/03/2019   • Mixed stress and urge urinary incontinence 01/03/2019   • Edema 12/13/2018   • Vitamin D deficiency 12/13/2018   • Microcytic anemia- postop THR 2/2019 12/13/2018   • Cervical myelopathy (HCC) 12/11/2018   • Left hip pain 09/18/2018   • DDD (degenerative disc disease), cervical- [facility]- dr brennan; fusion and laminectomy C3-T1 12/5/2018 dr brennan 08/09/2018   • Primary osteoarthritis of both hips- dr nowak; left THR done feb 6, 2019; right THR 3/2018 06/07/2018   • Morbid obesity with BMI of 40.0-44.9, adult ([facility]) 06/07/2018   • DDD (degenerative disc disease), lumbar 04/25/2018   • Uncomplicated opioid dependence (CMS-HCC)- [facility] 01/31/2018   • Essential hypertension 01/31/2018   • Lymphedema 10/17/2017   • Venous insufficiency 10/03/2017   • Chronic bilateral low back pain with bilateral sciatica- pain mgt;    [facility] 07/26/2017   • Chronic pain syndrome- [place] adv pain, dr sanders 06/08/2017   • Obesity (BMI 30-39.9) 12/02/2016   • Vitamin B 12 deficiency 06/02/2016   • History of gastric bypass 04/25/2012   • Mild intermittent asthma with acute exacerbation 04/25/2012      Tobacco [nicotiana tabacum]  Outpatient Medications Prior to Visit   Medication Sig Dispense Refill   • albuterol 108 (90 Base) MCG/ACT Aero Soln inhalation aerosol Inhale 1 Puff 1 time a day as needed.     • fluticasone (FLOVENT HFA) 44 MCG/ACT Aerosol Inhale 2 Puffs 2 times a day. Everyday maintenance steroid inhaler 1 Each 11   • potassium chloride SA (KDUR) 20 MEQ Tab CR Take 20 mEq by mouth 1 time a day as needed (Takes for leg cramps).     • VITAMIN D PO Take 1 Tablet by mouth every day.     • gabapentin (NEURONTIN) 600 MG tablet Take 1 tablet by mouth 4 times daily 360 Tablet 0   • phentermine 37.5 MG capsule Take 1 Capsule by mouth every morning for 90 days. 90 Capsule 1   • ipratropium-albuterol (DUONEB) 0.5-2.5 (3) MG/3ML nebulizer solution Take 3 mL by nebulization every 6 hours as needed for Shortness of Breath. 120 Each 11   • Albuterol Sulfate (PROAIR RESPICLICK) 108 (90 Base) MCG/ACT AEROSOL POWDER, BREATH ACTIVATED Inhale 1 Puff every 6 hours as needed (Wheezing). 1 Each 5   • morphine ER (MS CONTIN) 15 MG Tab CR tablet Take 15 mg by mouth every 12 hours.     • oxyCODONE-acetaminophen (PERCOCET-10)  MG Tab Take 1 Tablet by mouth every 6 hours as needed.  0   • Calcium Carbonate-Vitamin D 600-400 MG-UNIT Tab Take 1 Tablet by mouth every day. Indications: Low Amount of Calcium in the Blood     • Multiple Vitamins-Minerals (ONE-A-DAY WOMENS 50 PLUS PO) Take 1 tablet by mouth every day.     • ferrous sulfate 325 (65 Fe) MG tablet Take 1 Tab by mouth every morning with breakfast. 30 Tab 1   • methocarbamol (ROBAXIN) 500 MG Tab TAKE 2 TABLETS BY MOUTH 4 TIMES DAILY. 236 Tablet 0   • furosemide (LASIX) 40 MG Tab Take 1 Tablet by mouth every day. Indications: Edema 90 Tablet 4   • metaxalone (SKELAXIN) 800 MG Tab TAKE 1 TABLET BY MOUTH THREE TIMES DAILY (Patient taking differently: Take 800 mg by mouth 3 times a day.) 90 Tablet 0     No facility-administered medications prior to visit.                HPI    Review of Systems   Constitutional: Negative.    HENT: Negative.    Eyes: Negative.    Respiratory: Negative.    Cardiovascular: Negative.    Gastrointestinal: Negative.    Genitourinary: Negative.    Musculoskeletal: Negative.    Skin: Negative.    Neurological: Negative.    Endo/Heme/Allergies: Negative.    Psychiatric/Behavioral: Negative.               Objective     /74 (BP Location: Left arm, Patient Position: Sitting, BP Cuff Size: Adult)   Pulse 94   Temp 36.9 °C (98.5 °F) (Temporal)   Ht 1.524 m (5')   Wt 103 kg (228 lb)   LMP  (LMP Unknown)   SpO2 97%   BMI 44.53 kg/m²      Physical Exam  Vitals reviewed.   Constitutional:       General: She is not in acute distress.     Appearance: She is well-developed. She is not diaphoretic.   HENT:      Head: Normocephalic and atraumatic.      Right Ear: External ear normal.      Left Ear: External ear normal.      Nose: Nose normal.      Mouth/Throat:      Pharynx: No oropharyngeal exudate.   Eyes:      General: No scleral icterus.        Right eye: No discharge.         Left eye: No discharge.      Conjunctiva/sclera: Conjunctivae normal.      Pupils: Pupils are equal, round, and reactive to light.   Neck:      Thyroid: No thyromegaly.      Vascular: No JVD.      Trachea: No tracheal deviation.   Cardiovascular:      Rate and Rhythm: Normal rate and regular rhythm.      Heart sounds: Normal heart sounds. No murmur heard.    No friction rub. No gallop.   Pulmonary:      Effort: Pulmonary effort is normal. No respiratory distress.      Breath sounds: Normal breath sounds. No stridor. No wheezing or rales.   Chest:      Chest wall: No tenderness.   Abdominal:      General: Bowel sounds are normal. There is no distension.      Palpations: Abdomen is soft. There is no mass.      Tenderness: There is no abdominal tenderness. There is no guarding or rebound.   Musculoskeletal:         General: No tenderness. Normal range of motion.      Cervical  back: Normal range of motion and neck supple.   Lymphadenopathy:      Cervical: No cervical adenopathy.   Skin:     General: Skin is warm and dry.      Coloration: Skin is not pale.      Findings: No erythema or rash.   Neurological:      Mental Status: She is alert and oriented to person, place, and time.      Cranial Nerves: No cranial nerve deficit.      Motor: No abnormal muscle tone.      Coordination: Coordination normal.      Deep Tendon Reflexes: Reflexes are normal and symmetric. Reflexes normal.   Psychiatric:         Behavior: Behavior normal.         Thought Content: Thought content normal.         Judgment: Judgment normal.                             Assessment & Plan        1. Obesity (BMI 30-39.9)     Under good control. Continue same regimen.]  - phentermine 37.5 MG capsule; Take 1 Capsule by mouth every morning for 90 days.  Dispense: 90 Capsule; Refill: 0  - phentermine 37.5 MG capsule; Take 1 Capsule by mouth every morning for 90 days.  Dispense: 90 Capsule; Refill: 0    2. Edema, unspecified type   Under good control. Continue same regimen.    - furosemide (LASIX) 40 MG Tab; Take 1 Tablet by mouth every day. Indications: Edema  Dispense: 90 Tablet; Refill: 4    3. Hypokalemia   Under good control. Continue same regimen.    - potassium chloride SA (KDUR) 20 MEQ Tab CR; Take 1 Tablet by mouth every day.  Dispense: 90 Tablet; Refill: 4    4. DDD (degenerative disc disease), lumbar  Under good control. Continue same regimen.     - methocarbamol (ROBAXIN) 500 MG Tab; Take 2 Tablets by mouth 4 times a day as needed.  Dispense: 240 Tablet; Refill: 5    5. DDD (degenerative disc disease), cervical- [facility]- dr brennan; fusion and laminectomy C3-T1 12/5/2018 dr brennan  Under good control. Continue same regimen.     - methocarbamol (ROBAXIN) 500 MG Tab; Take 2 Tablets by mouth 4 times a day as needed.  Dispense: 240 Tablet; Refill: 5    6. Screening for colorectal cancer        - Referral to GI  for Colonoscopy

## 2022-03-02 ENCOUNTER — NON-PROVIDER VISIT (OUTPATIENT)
Dept: WOUND CARE | Facility: MEDICAL CENTER | Age: 57
End: 2022-03-02
Attending: NURSE PRACTITIONER
Payer: COMMERCIAL

## 2022-03-02 DIAGNOSIS — L03.115 CELLULITIS OF RIGHT LOWER EXTREMITY: ICD-10-CM

## 2022-03-02 PROCEDURE — 16020 DRESS/DEBRID P-THICK BURN S: CPT | Performed by: NURSE PRACTITIONER

## 2022-03-02 PROCEDURE — 97602 WOUND(S) CARE NON-SELECTIVE: CPT

## 2022-03-02 NOTE — PROCEDURES
Non-selective debridement of Right lower extremity wound use sterile gauze and purasyn spray to remove biofilm layer from from wound bed.  Wound vac reapplied, no leaks noted and 2 layer compression wrap placed.    Pt tolerated well.

## 2022-03-02 NOTE — PATIENT INSTRUCTIONS
-Keep your wound dressing clean, dry, and intact.    -Change your dressing if it becomes soiled, soaked, or falls off.    -Remove your compression wrap if you have severe pain, severe swelling, numbness, color change, or temperature change in your toes. If you need to remove your compression wrap, do so by unrolling it. Do not cut the compression wrap off to prevent cutting yourself on accident.    -Wound vac may not have any drainage in tube or cannister & it will still be working.   Change cannister if it does become full by pressing tab on side of machine to remove canister and snap on new one. Full canister can be thrown in the trash. If cannister fills with bright red blood - go to ER. Dressing will be changed every MWF at the wound clini.  If you are having issues with your wound VAC, please consider patching leaks, changing the canister, or calling [phone] for troubleshooting. If the wound VAC has been off or un-operational for over 2 hours, call wound care center to inform them and remove all dressings including black foam and replace with normal saline damp gauze.       -Should you experience any significant changes in your wound(s), such as infection (redness, swelling, localized heat, increased pain, fever > 101 F, chills) or have any questions regarding your home care instructions, please contact the wound center at [phone]. If after hours, contact your primary care physician or go to the hospital emergency room.

## 2022-03-02 NOTE — PROGRESS NOTES
I saw the patient earlier this week for debridement thus did not need to be seen today by a provider.  Her wound VAC was changed in clinic by wound care RN.  Patient to return on Friday for another wound VAC dressing change.

## 2022-03-04 ENCOUNTER — NON-PROVIDER VISIT (OUTPATIENT)
Dept: WOUND CARE | Facility: MEDICAL CENTER | Age: 57
End: 2022-03-04
Attending: NURSE PRACTITIONER
Payer: COMMERCIAL

## 2022-03-04 PROCEDURE — 97605 NEG PRS WND THER DME<=50SQCM: CPT

## 2022-03-04 NOTE — PATIENT INSTRUCTIONS
-Keep your wound dressing clean, dry, and intact.    -Remove your compression wrap if you have severe pain, severe swelling, numbness, color change, or temperature change in your toes. If you need to remove your compression wrap, do so by unrolling it. Do not cut the compression wrap off to prevent cutting yourself on accident.    -Wound vac may not have any drainage in tube or cannister & it will still be working.   Change cannister if it does become full by pressing tab on side of machine to remove canister and snap on new one. Full canister can be thrown in the trash. If cannister fills with bright red blood - go to ER. Dressing will be changed every MWF at the wound clini.  If you are having issues with your wound VAC, please consider patching leaks, changing the canister, or calling [phone] for troubleshooting. If the wound VAC has been off or un-operational for over 2 hours, call wound care center to inform them and remove all dressings including black foam and replace with normal saline damp gauze.     -Should you experience any significant changes in your wound(s), such as infection (redness, swelling, localized heat, increased pain, fever > 101 F, chills) or have any questions regarding your home care instructions, please contact the wound center at [phone]. If after hours, contact your primary care physician or go to the hospital emergency room.

## 2022-03-04 NOTE — PROCEDURES
2% viscous lidocaine applied topically to wound bed for approximately 5 minutes prior to NPWT dressing application. NPWT < 50 sq cm dressing changed this visit using 1 pieces of black foam. Black foam placed to wound bed and used for tracpad. Pump set to 125 mmhg,  continuous suction. No leaks detected. 2 layer compression wrap applied to RLE, refer to flow sheet for other wound care details. Patient tolerated the procedure well.

## 2022-03-07 ENCOUNTER — OFFICE VISIT (OUTPATIENT)
Dept: WOUND CARE | Facility: MEDICAL CENTER | Age: 57
End: 2022-03-07
Attending: NURSE PRACTITIONER
Payer: COMMERCIAL

## 2022-03-07 VITALS
RESPIRATION RATE: 20 BRPM | SYSTOLIC BLOOD PRESSURE: 126 MMHG | TEMPERATURE: 97 F | HEART RATE: 88 BPM | OXYGEN SATURATION: 90 % | DIASTOLIC BLOOD PRESSURE: 74 MMHG

## 2022-03-07 DIAGNOSIS — S81.801D OPEN WOUND OF RIGHT LOWER EXTREMITY, SUBSEQUENT ENCOUNTER: Primary | ICD-10-CM

## 2022-03-07 DIAGNOSIS — R89.5 POSITIVE CULTURE FINDINGS IN WOUND: ICD-10-CM

## 2022-03-07 DIAGNOSIS — I89.0 LYMPHEDEMA: ICD-10-CM

## 2022-03-07 PROCEDURE — 16020 DRESS/DEBRID P-THICK BURN S: CPT | Performed by: NURSE PRACTITIONER

## 2022-03-07 PROCEDURE — 11045 DBRDMT SUBQ TISS EACH ADDL: CPT | Performed by: NURSE PRACTITIONER

## 2022-03-07 PROCEDURE — 99213 OFFICE O/P EST LOW 20 MIN: CPT | Mod: 25 | Performed by: NURSE PRACTITIONER

## 2022-03-07 PROCEDURE — 11042 DBRDMT SUBQ TIS 1ST 20SQCM/<: CPT

## 2022-03-07 PROCEDURE — 99213 OFFICE O/P EST LOW 20 MIN: CPT

## 2022-03-07 PROCEDURE — 97605 NEG PRS WND THER DME<=50SQCM: CPT

## 2022-03-07 PROCEDURE — 11042 DBRDMT SUBQ TIS 1ST 20SQCM/<: CPT | Performed by: NURSE PRACTITIONER

## 2022-03-07 PROCEDURE — 11045 DBRDMT SUBQ TISS EACH ADDL: CPT

## 2022-03-07 ASSESSMENT — ENCOUNTER SYMPTOMS
NAUSEA: 0
DIZZINESS: 0
DOUBLE VISION: 0
VOMITING: 0
DIARRHEA: 0
CONSTIPATION: 0
COUGH: 0
HEADACHES: 0
FEVER: 0
ROS SKIN COMMENTS: FULL-THICKNESS WOUND RIGHT ANTERIOR LOWER EXTREMITY
SHORTNESS OF BREATH: 0
PALPITATIONS: 0
BLURRED VISION: 0
CHILLS: 0
WHEEZING: 0

## 2022-03-07 NOTE — PATIENT INSTRUCTIONS
Wound VAC at 125mmHg continuous with 2 black foam. Dressing will be changed every M TH at the wound clinic or with your home health nurse.  If you are having issues with your wound VAC, please consider patching leaks, changing the canister, or calling [phone] for troubleshooting. If the wound VAC has been off or un-operational for over 2 hours, call wound care center to inform them and remove all dressings including black foam and replace with normal saline damp gauze.     Avoid prolonged standing or sitting without elevating your legs.  - Multilayer compression wrap to left leg. Do not get wet and keep on for the week. Only remove if temperature or sensation changes.   If compression needs to be removed, un-wrap it do not cut it off.     Should you experience any significant changes in your wound(s), such as infection (redness, swelling, localized heat, increased pain, fever > 101 F, chills) or have any questions regarding your home care instructions, please contact the wound center at [phone]. If after hours, contact your primary care physician or go to the hospital emergency room.   Keep dressing clean, dry and covered while bathing. Only change dressing if it becomes over saturated, soiled or falls off.
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
## 2022-03-10 ENCOUNTER — OFFICE VISIT (OUTPATIENT)
Dept: WOUND CARE | Facility: MEDICAL CENTER | Age: 57
End: 2022-03-10
Attending: NURSE PRACTITIONER
Payer: COMMERCIAL

## 2022-03-10 DIAGNOSIS — S81.801D OPEN WOUND OF RIGHT LOWER EXTREMITY, SUBSEQUENT ENCOUNTER: ICD-10-CM

## 2022-03-10 PROCEDURE — 97602 WOUND(S) CARE NON-SELECTIVE: CPT

## 2022-03-10 PROCEDURE — 16020 DRESS/DEBRID P-THICK BURN S: CPT | Performed by: NURSE PRACTITIONER

## 2022-03-10 NOTE — PATIENT INSTRUCTIONS
Wound VAC at 125mmHg continuous or intermittent with black oam. Dressing will be changed every MWF at the wound clinic or with your home health nurse.  If you are having issues with your wound VAC, please consider patching leaks, changing the canister, or calling [phone] for troubleshooting. If the wound VAC has been off or un-operational for over 2 hours, call wound care center to inform them and remove all dressings including black foam and replace with normal saline damp gauze.     Should you experience any significant changes in your wound(s), such as infection (redness, swelling, localized heat, increased pain, fever > 101 F, chills) or have any questions regarding your home care instructions, please contact the wound center at [phone]. If after hours, contact your primary care physician or go to the hospital emergency room.   Keep dressing clean, dry and cover when bathing. Only change dressing if it's over saturated, soiled or falls off.

## 2022-03-10 NOTE — PROCEDURES
Nonselective debridement with puracyn and gauze to remove slough from wound beds. Right anterior wound vac applied with 2 pieces of black foam at 125mmhg continuous. No leaks notes and tolerated well.

## 2022-03-14 ENCOUNTER — NON-PROVIDER VISIT (OUTPATIENT)
Dept: WOUND CARE | Facility: MEDICAL CENTER | Age: 57
End: 2022-03-14
Attending: NURSE PRACTITIONER
Payer: COMMERCIAL

## 2022-03-14 DIAGNOSIS — L03.115 CELLULITIS OF RIGHT LOWER EXTREMITY: ICD-10-CM

## 2022-03-14 PROCEDURE — 16020 DRESS/DEBRID P-THICK BURN S: CPT | Performed by: NURSE PRACTITIONER

## 2022-03-14 PROCEDURE — 97602 WOUND(S) CARE NON-SELECTIVE: CPT

## 2022-03-14 NOTE — PATIENT INSTRUCTIONS
-Keep your wound dressing clean, dry, and intact.    -Remove your compression wrap if you have severe pain, severe swelling, numbness, color change, or temperature change in your toes. If you need to remove your compression wrap, do so by unrolling it. Do not cut the compression wrap off to prevent cutting yourself on accident.    -Wound vac may not have any drainage in tube or cannister & it will still be working.   Change cannister if it does become full by pressing tab on side of machine to remove canister and snap on new one. Full canister can be thrown in the trash. If cannister fills with bright red blood - go to ER. Dressing will be changed every MWF at the wound clini.  If you are having issues with your wound VAC, please consider patching leaks, changing the canister, or calling [phone] for troubleshooting. If the wound VAC has been off or un-operational for over 2 hours, call wound care center to inform them and remove all dressings including black foam and replace with normal saline damp gauze.     -Should you experience any significant changes in your wound(s), such as infection (redness, swelling, localized heat, increased pain, fever > 101 F, chills) or have any questions regarding your home care instructions, please contact the wound center at [phone]. If after hours, contact your primary care physician or go to the hospital emergency room.

## 2022-03-14 NOTE — PROCEDURES
Nonselective debridement with puracyn and gauze to remove slough from wound beds, patient tolerated well. Right anterior wound vac applied with 1 piece of black foam at 125 mm Hg continuous. No leaks notes and tolerated well.   2 layer compression wrap applied.

## 2022-03-17 ENCOUNTER — OFFICE VISIT (OUTPATIENT)
Dept: WOUND CARE | Facility: MEDICAL CENTER | Age: 57
End: 2022-03-17
Attending: NURSE PRACTITIONER
Payer: COMMERCIAL

## 2022-03-17 VITALS
TEMPERATURE: 97.9 F | DIASTOLIC BLOOD PRESSURE: 96 MMHG | SYSTOLIC BLOOD PRESSURE: 138 MMHG | RESPIRATION RATE: 18 BRPM | HEART RATE: 96 BPM | OXYGEN SATURATION: 90 %

## 2022-03-17 DIAGNOSIS — R89.5 POSITIVE CULTURE FINDINGS IN WOUND: ICD-10-CM

## 2022-03-17 DIAGNOSIS — I89.0 LYMPHEDEMA: ICD-10-CM

## 2022-03-17 DIAGNOSIS — T24.331D FULL THICKNESS BURN OF RIGHT LOWER LEG, SUBSEQUENT ENCOUNTER: ICD-10-CM

## 2022-03-17 PROCEDURE — 11042 DBRDMT SUBQ TIS 1ST 20SQCM/<: CPT

## 2022-03-17 PROCEDURE — 99213 OFFICE O/P EST LOW 20 MIN: CPT

## 2022-03-17 PROCEDURE — 16020 DRESS/DEBRID P-THICK BURN S: CPT

## 2022-03-17 PROCEDURE — 16020 DRESS/DEBRID P-THICK BURN S: CPT | Performed by: NURSE PRACTITIONER

## 2022-03-17 PROCEDURE — 11045 DBRDMT SUBQ TISS EACH ADDL: CPT

## 2022-03-17 PROCEDURE — 99213 OFFICE O/P EST LOW 20 MIN: CPT | Mod: 25 | Performed by: NURSE PRACTITIONER

## 2022-03-17 ASSESSMENT — ENCOUNTER SYMPTOMS
CHILLS: 0
PALPITATIONS: 0
DIZZINESS: 0
COUGH: 0
NAUSEA: 0
DOUBLE VISION: 0
DIARRHEA: 0
WHEEZING: 0
CONSTIPATION: 0
VOMITING: 0
FEVER: 0
ROS SKIN COMMENTS: FULL-THICKNESS WOUND RIGHT ANTERIOR LOWER EXTREMITY
BLURRED VISION: 0
HEADACHES: 0
SHORTNESS OF BREATH: 0
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
## 2022-03-17 NOTE — PATIENT INSTRUCTIONS
-Keep your wound dressing clean, dry, and intact.    -Change your dressing if it becomes soiled, soaked, or falls off.    -Remove your compression wrap if you have severe pain, severe swelling, numbness, color change, or temperature change in your toes. If you need to remove your compression wrap, do so by unrolling it. Do not cut the compression wrap off to prevent cutting yourself on accident.    -Should you experience any significant changes in your wound(s), such as infection (redness, swelling, localized heat, increased pain, fever > 101 F, chills) or have any questions regarding your home care instructions, please contact the wound center at [phone]. If after hours, contact your primary care physician or go to the hospital emergency room.

## 2022-03-21 ENCOUNTER — NON-PROVIDER VISIT (OUTPATIENT)
Dept: WOUND CARE | Facility: MEDICAL CENTER | Age: 57
End: 2022-03-21
Attending: NURSE PRACTITIONER
Payer: COMMERCIAL

## 2022-03-21 VITALS
OXYGEN SATURATION: 95 % | DIASTOLIC BLOOD PRESSURE: 53 MMHG | HEART RATE: 105 BPM | TEMPERATURE: 97.3 F | RESPIRATION RATE: 19 BRPM | SYSTOLIC BLOOD PRESSURE: 114 MMHG

## 2022-03-21 DIAGNOSIS — L03.115 CELLULITIS OF RIGHT LOWER EXTREMITY: ICD-10-CM

## 2022-03-21 PROCEDURE — 97597 DBRDMT OPN WND 1ST 20 CM/<: CPT

## 2022-03-21 PROCEDURE — 16020 DRESS/DEBRID P-THICK BURN S: CPT | Performed by: NURSE PRACTITIONER

## 2022-03-21 NOTE — PATIENT INSTRUCTIONS
Wound VAC at 125mmHg intermittent with 2 black foam. Dressing will be changed every MWF at the wound clinic or with your home health nurse.  If you are having issues with your wound VAC, please consider patching leaks, changing the canister, or calling [phone] for troubleshooting. If the wound VAC has been off or un-operational for over 2 hours, call wound care center to inform them and remove all dressings including black foam and replace with normal saline damp gauze.     Should you experience any significant changes in your wound(s), such as infection (redness, swelling, localized heat, increased pain, fever > 101 F, chills) or have any questions regarding your home care instructions, please contact the wound center at [phone]. If after hours, contact your primary care physician or go to the hospital emergency room.   Keep dressing clean, dry and cover when bathing. Only change dressing if it's over saturated, soiled or falls off.

## 2022-03-21 NOTE — WOUND TEAM
CSWD using curette to remove ~7 cm2 of slough and biofilm.  Pt tolerated well.  No s/s of complications noted. Placed 2 total pieces of black foam.  NPWT @125mmHg intermittent with no leaks detected. Dr. Galeana approved the change of setting to intermittent.  No s/s of complications noted.  Pt tolerated dressing change well.

## 2022-03-22 ENCOUNTER — TELEPHONE (OUTPATIENT)
Dept: WOUND CARE | Facility: MEDICAL CENTER | Age: 57
End: 2022-03-22
Payer: COMMERCIAL

## 2022-03-22 NOTE — TELEPHONE ENCOUNTER
Wound vac is loud and wanted to know if there is a way to quiet it down. It was set at intermittent yesterday at appointment and she said it cycles and gets loud. When operating she confirmed at 125mmhg. Advised to put in drawer or under a pillow.  Karine agreed to have it evaluated at visit on Thursday with next visit.

## 2022-03-24 ENCOUNTER — OFFICE VISIT (OUTPATIENT)
Dept: WOUND CARE | Facility: MEDICAL CENTER | Age: 57
End: 2022-03-24
Attending: NURSE PRACTITIONER
Payer: COMMERCIAL

## 2022-03-24 VITALS
RESPIRATION RATE: 18 BRPM | DIASTOLIC BLOOD PRESSURE: 65 MMHG | OXYGEN SATURATION: 95 % | TEMPERATURE: 98.6 F | SYSTOLIC BLOOD PRESSURE: 120 MMHG | HEART RATE: 106 BPM

## 2022-03-24 DIAGNOSIS — T24.331D FULL THICKNESS BURN OF RIGHT LOWER LEG, SUBSEQUENT ENCOUNTER: ICD-10-CM

## 2022-03-24 DIAGNOSIS — R89.5 POSITIVE CULTURE FINDINGS IN WOUND: ICD-10-CM

## 2022-03-24 DIAGNOSIS — I89.0 LYMPHEDEMA: ICD-10-CM

## 2022-03-24 PROCEDURE — 99214 OFFICE O/P EST MOD 30 MIN: CPT

## 2022-03-24 PROCEDURE — 16020 DRESS/DEBRID P-THICK BURN S: CPT

## 2022-03-24 PROCEDURE — 11042 DBRDMT SUBQ TIS 1ST 20SQCM/<: CPT

## 2022-03-24 PROCEDURE — 16020 DRESS/DEBRID P-THICK BURN S: CPT | Performed by: NURSE PRACTITIONER

## 2022-03-24 PROCEDURE — 11045 DBRDMT SUBQ TISS EACH ADDL: CPT

## 2022-03-24 PROCEDURE — 97605 NEG PRS WND THER DME<=50SQCM: CPT

## 2022-03-24 PROCEDURE — 99213 OFFICE O/P EST LOW 20 MIN: CPT | Mod: 25 | Performed by: NURSE PRACTITIONER

## 2022-03-24 ASSESSMENT — ENCOUNTER SYMPTOMS
PALPITATIONS: 0
CHILLS: 0
VOMITING: 0
BLURRED VISION: 0
DOUBLE VISION: 0
CONSTIPATION: 0
HEADACHES: 0
ROS SKIN COMMENTS: FULL-THICKNESS WOUND RIGHT ANTERIOR LOWER EXTREMITY
NAUSEA: 0
FEVER: 0
SHORTNESS OF BREATH: 0
WHEEZING: 0
DIARRHEA: 0
DIZZINESS: 0
COUGH: 0
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
## 2022-03-24 NOTE — PATIENT INSTRUCTIONS
-Keep your wound dressing clean, dry, and intact.    -Change your dressing if it becomes soiled, soaked, or falls off.    -Remove your compression wrap if you have severe pain, severe swelling, numbness, color change, or temperature change in your toes. If you need to remove your compression wrap, do so by unrolling it. Do not cut the compression wrap off to prevent cutting yourself on accident.    -Wound vac may not have any drainage in tube or cannister & it will still be working.   Change cannister if it does become full by pressing tab on side of machine to remove canister and snap on new one. Full canister can be thrown in the trash. If cannister fills with bright red blood - go to ER. Dressing will be changed every MWF at the wound clini.  If you are having issues with your wound VAC, please consider patching leaks, changing the canister, or calling [phone] for troubleshooting. If the wound VAC has been off or un-operational for over 2 hours, call wound care center to inform them and remove all dressings including black foam and replace with normal saline damp gauze.     -Should you experience any significant changes in your wound(s), such as infection (redness, swelling, localized heat, increased pain, fever > 101 F, chills) or have any questions regarding your home care instructions, please contact the wound center at [phone]. If after hours, contact your primary care physician or go to the hospital emergency room.

## 2022-03-28 ENCOUNTER — NON-PROVIDER VISIT (OUTPATIENT)
Dept: WOUND CARE | Facility: MEDICAL CENTER | Age: 57
End: 2022-03-28
Attending: NURSE PRACTITIONER
Payer: COMMERCIAL

## 2022-03-28 ENCOUNTER — PHYSICAL THERAPY (OUTPATIENT)
Dept: PHYSICAL THERAPY | Facility: REHABILITATION | Age: 57
End: 2022-03-28
Attending: NURSE PRACTITIONER
Payer: COMMERCIAL

## 2022-03-28 DIAGNOSIS — L03.115 CELLULITIS OF RIGHT LOWER EXTREMITY: ICD-10-CM

## 2022-03-28 DIAGNOSIS — I89.0 LYMPHEDEMA OF EXTREMITY: ICD-10-CM

## 2022-03-28 PROCEDURE — 97605 NEG PRS WND THER DME<=50SQCM: CPT | Performed by: NURSE PRACTITIONER

## 2022-03-28 PROCEDURE — 97605 NEG PRS WND THER DME<=50SQCM: CPT

## 2022-03-28 PROCEDURE — 97161 PT EVAL LOW COMPLEX 20 MIN: CPT

## 2022-03-28 PROCEDURE — 97535 SELF CARE MNGMENT TRAINING: CPT

## 2022-03-28 NOTE — OP THERAPY EVALUATION
"  Outpatient Physical Therapy  LYMPHEDEMA THERAPY INITIAL EVALUATION    [facility] Physical Therapy [place] [address] [facility]  [address]  [place] [address]  Phone:  [phone]  Fax:  [phone]    Date of Evaluation: 03/28/2022    Patient: Karine Ovalle  YOB: 1965  MRN: [id number]     Referring Provider: ADITYA Talbot  [address] [facility]   [place] [id number]   Referring Diagnosis Lymphedema [I89.0]     Time Calculation    Start time: 1117  Stop time: 1212 Time Calculation (min): 55 minutes             Chief Complaint: Lipolymphedema    Visit Diagnoses     ICD-10-CM   1. Lymphedema of extremity  I89.0       Subjective:   History of Present Illness:     Mechanism of injury:  Pt reports she got a wound to R LE December 28th of last year (2021) from her space heater. Had a clear blister that popped and became infected. She was ultimately hospitalized for antibiotics for her R leg wound. She has been seeing advanced wound care for treatment. Last week she felt she was wrapped too tightly and proximal R thigh swelled. Today she arrives with a wound vac to R LE.     Pt reporting she had a VILMA to L hip in 2019 and noticed swelling after that. She reported she went to [facility] for lymphedema therapy. She has a pump but has not been using it due to her wound.     Pt also reports she has a family hx of women having \"big legs\". She presents today with ill-defined ankles but reasonably sized feet, without swelling.   Patient Goals:     Patient goals for therapy:  Decreased edema      Past Medical History:   Diagnosis Date   • Administrative encounter- RTC ACCESS/ADA PARATRANSIT ELIGIBILITY 6/4/2019   • Anemia    • Arthritis     osteo/hips and back   • At risk for falls    • Chronic back pain    • Dental disorder     lower denture   • Pain 12/04/2018    \"ALL OVER\", 10/10   • Pain 02/04/2019    arthritic pain   • Urinary incontinence     using pads     Past Surgical History:   Procedure " "Laterality Date   • HIP ARTHROPLASTY TOTAL Left 2/13/2019    Procedure: HIP ARTHROPLASTY TOTAL, Cabling of intra-operative calcar fracture;  Surgeon: Bryon Levien M.D.;  Location: SURGERY [facility];  Service: Orthopedics   • CERVICAL FUSION POSTERIOR  12/7/2018    Procedure: CERVICAL FUSION POSTERIOR- STAGE #2 C3-5 AND C3-T1;  Surgeon: Emre Mendoza III, M.D.;  Location: SURGERY [facility];  Service: Neurosurgery   • CERVICAL LAMINECTOMY POSTERIOR  12/7/2018    Procedure: CERVICAL LAMINECTOMY POSTERIOR C2-T1;  Surgeon: Emre Mendoza III, M.D.;  Location: SURGERY [facility];  Service: Neurosurgery   • CERVICAL DISK AND FUSION ANTERIOR  12/5/2018    Procedure: CERVICAL DISK AND FUSION ANTERIOR-STAGE #1 C4-5;  Surgeon: Emre Mendoza III, M.D.;  Location: [facility];  Service: Neurosurgery   • CORPECTOMY  12/5/2018    Procedure: CORPECTOMY;  Surgeon: Emre Mendoza III, M.D.;  Location: [facility];  Service: Neurosurgery   • HIP ARTHROPLASTY TOTAL Right 3/20/2018    Procedure: HIP ARTHROPLASTY TOTAL;  Surgeon: Bryon Levine M.D.;  Location: SURGERY [facility];  Service: Orthopedics   • KNEE ARTHROPLASTY TOTAL Right 10/20/2015    Procedure: KNEE ARTHROPLASTY TOTAL;  Surgeon: Bryon Levine M.D.;  Location: SURGERY [facility];  Service:    • APPENDECTOMY LAPAROSCOPIC  3/21/2013    Performed by Dali Queen M.D. at [facility]   • DEBRIDEMENT  5/17/2012    Performed by BRADLEY DYKES at [facility]   • PLASTIC SURGERY  2004    excess skin on arms and legs removed   • MAMMOPLASTY AUGMENTATION Bilateral 2004    breast implants and \"tummy tuck\"   • GASTRIC BYPASS LAPAROSCOPIC  2002    x 2   • ABDOMINAL EXPLORATION     • OTHER      breast augmentation 2004   • OTHER      Gastric bypass 2002     Social History     Tobacco Use   • Smoking status: Never Smoker   • Smokeless tobacco: Never Used   Substance Use Topics   • Alcohol use: Not " "Currently     Comment: 3-4 per week; pt stops alcohol use 1-week ago     Family and Occupational History     Socioeconomic History   • Marital status: Single     Spouse name: Not on file   • Number of children: Not on file   • Years of education: Not on file   • Highest education level: Not on file   Occupational History   • Not on file       Lymphedema Objective    Visit type   Lipolymphedema      Skin Appearance    Wound vac to R LE with 4-layer over .  Left Lower Extremity Circumferential Measurements  Waist: cm  Hip: cm  Scrotum: cm  Ground/Upper Thigh: cm  Mid Thigh: 64.3 cm  Knee: 49.7 cm  Upper Calf: 52.2 cm  Mid Calf: 42.5 cm  Ankle: 28.8 cm  Heel to Foot: 23 cm  Total: 260.5 cm    Right Lower Extremity Circumferential Measurements  Waist: cm  Hip: cm  Scrotum: cm  Ground/Upper Thigh: cm  Mid Thigh: 64 cm  Knee: 57 cm  Upper Calf: 57.7 cm  Mid Calf: 45.5 cm  Ankle: 33.1 cm  Heel to Foot: 23.1 cm  Total: 280.4 cm    Lymphedema Stage  Stage 2 Lymphedema          Therapeutic Treatments and Modalities:     1. Functional Training, Self Care (CPT 37993)    Therapeutic Treatment and Modalities Summary: Educated patient on pathogenesis of lymphedema. Distributed brochure. Patient has also been educated on skin care precautions including hygiene, lotions with pH 5-5.5, and avoidance of lacerations/mosquito bites/heat. Also educated pt on signs/symptoms of cellulitis.      Patient was invited to return for in clinic, skilled physical therapist administered MLD for which patient will set an additional appointment.       Following discussion re: the need for external compression and education in compression garment options.  Patient was educated in its optimal use (all day, every day, especially during higher risk activities as discussed, remove at night).  Lymphedema risk factors were discussed with a lymphedema \"Do's and Don'ts\" handout provided.  Patient verbalized understanding to all education today.    Time-based " "treatments/modalities:    Physical Therapy Timed Treatment Charges  Functional training, self care minutes (CPT 71143): 25 minutes      Assessment and Plan:   Functional Impairments: impaired functional mobility, lacks appropriate home exercise program and swelling    Assessment details:  Pt is a 57yo F with hx of R LE swelling that she feels began in 2019. She has since developed a wound to distal R LE and is being treated for that. Visually, pt demonstrates a small waist and feet as compared to legs along with a family hx of women with \"big legs\" (per pt). Suspect pt with symptoms of lipedema as well. 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  Lymphedema is characterized by high protein edema in the interstitial tissues causing damage to the lymph transport system and resulting in decreased transport capacity of the lymphatic system.  Patient has tried elevation, self OTC compression garments, diuresis and a low sodium diet, all of which were unsuccessful at treating their swelling.  Skilled lymphedema treatment is unable to be carried out by the patient and unskilled caregivers. Services will be provided by a certified lymphedema therapist.  Complete decongestive therapy is considered the gold standard of medical practice for lymphedema treatment and has proven to be effective for reduction in limb volume size and decrease risk of complications secondary to swelling (such as wounds and infections).    Patient to be seen until decongestion occurs. Physical therapy interventions to include manual lymphatic drainage, compression bandaging, skin care education, wound care if applicable, therapeutic exercises, custom garment fitting and lymphedema education to improve skin integrity, decrease lymphedema swelling, improve joint arthrokinematics and range of motion and improve quality of life.    Spontaneous recovery is not expected without skilled completed decongestive " lymphedema therapy.    The patient was informed of evaluation findings and intervention plan and agrees to participate in the plan as outlined.  Prognosis: fair      Goals:   Short Term Goals:  1. Pt will achieve a total limb circumference reduction of 5cm in order to decrease risk for infection and progression of disease process.  2. Pt will be independent with self-MLD to facilitate fluid migration to healthy tissues and lymph nodes.   3. Pt will consistently perform exercises with bandages on to facilitate muscle pump of fluid from affected extremity.  Short term goal time span:  2-4 weeks    Long Term Goals:  1. Pt will have a reduction in total limb circumference of 10cm in order to decrease risk for infection and progression of disease process.   2. Patient will be independent with maintenance phase management of lymphedema including: compression garments, skin care education, risk reduction strategies, manual lymphatic drainage, and therapeutic exercise.  Long term goal time span:  4-6 weeks    Plan:  Therapy options:  Physical therapy treatment to continue  Planned therapy interventions:  Caregiver education, compression bandaging, compression stocking, decongestive exercises, home exercise program, intermittent compression, manual lymph drainage, myofascial release techniques, orthotic measurements/fitting, patient education, postural exercises, range of motion exercises, referral for compression garment & instructions for don/doffing, self-care/training (CPT 15751), sequential compression pump, short stretch bandages, soft tissue manual techniques (CPT 29871), skin/wound care, strengthening exercises and Velcro wraps  Planned education:  Functional anatomy and physiology of the lymphatic system, pathophysiology of lymphedema, lymphedema precautions, lymphedema exercise, proper skin care/nutrition, compression bandaging, self massage, infection prevention, scar tissue management, activity guidelines,  dietary guidelines, home pump use, skin care guidelines, bandage removal and long term self-management of lymphedema  Frequency:  2x week  Duration in weeks:  8  Discussed with:  Patient      Functional Assessment Used    LLIS 28%    Referring provider co-signature:  I have reviewed this plan of care and my co-signature certifies the need for services.    Certification Period: 03/28/2022 to  05/23/22    Physician Signature: ________________________________ Date: ______________

## 2022-03-28 NOTE — PATIENT INSTRUCTIONS
Wound VAC at 125mmHg continuous with black foam. Dressing will be changed every M/Th at the wound clinic.  If you are having issues with your wound VAC, please consider patching leaks, changing the canister, or calling [phone] for troubleshooting. If the wound VAC has been off or un-operational for over 2 hours, call wound care center to inform them and remove all dressings including black foam and replace with normal saline damp gauze to be changed by you daily.     Should you experience any significant changes in your wound(s), such as infection (redness, swelling, localized heat, increased pain, fever > 101 F, chills) or have any questions regarding your home care instructions, please contact the wound center at [phone]. If after hours, contact your primary care physician or go to the hospital emergency room.   Keep dressing clean, dry and cover when bathing. Only change dressing if it's over saturated, soiled or falls off.

## 2022-03-28 NOTE — PROCEDURES
Applied NPWT to lower leg anterior wound with keeping hose on outside of 2 layer wrap; no leaks noted and sealed to 125 mmHg.

## 2022-03-28 NOTE — NON-PROVIDER
Patient arrive to appointment with no compression on right leg and black foam still in wound with wound vac not attached; patient report it had been alarming since Saturday (so non-operational for 2 days). Provided education to patient and discussed at length with patient rationale for troubleshooting alarming VAC, how to change cannister, what to do, and when to remove all black foam replacing with damp gauze, as well as when to seek urgent/emergent care; patient can teach back education. Patient reported she had called I but did not want to go to the Emergency or Urgent care due to the cost.

## 2022-04-03 ENCOUNTER — APPOINTMENT (OUTPATIENT)
Dept: RADIOLOGY | Facility: MEDICAL CENTER | Age: 57
DRG: 871 | End: 2022-04-03
Attending: EMERGENCY MEDICINE
Payer: COMMERCIAL

## 2022-04-03 ENCOUNTER — APPOINTMENT (OUTPATIENT)
Dept: RADIOLOGY | Facility: MEDICAL CENTER | Age: 57
DRG: 871 | End: 2022-04-03
Attending: HOSPITALIST
Payer: COMMERCIAL

## 2022-04-03 ENCOUNTER — HOSPITAL ENCOUNTER (INPATIENT)
Facility: MEDICAL CENTER | Age: 57
LOS: 9 days | DRG: 871 | End: 2022-04-12
Attending: EMERGENCY MEDICINE | Admitting: HOSPITALIST
Payer: COMMERCIAL

## 2022-04-03 DIAGNOSIS — S81.801D OPEN WOUND OF RIGHT LOWER EXTREMITY, SUBSEQUENT ENCOUNTER: ICD-10-CM

## 2022-04-03 DIAGNOSIS — L02.91 ABSCESS: ICD-10-CM

## 2022-04-03 DIAGNOSIS — A41.9 SEPSIS WITHOUT ACUTE ORGAN DYSFUNCTION, DUE TO UNSPECIFIED ORGANISM (HCC): ICD-10-CM

## 2022-04-03 DIAGNOSIS — L03.115 CELLULITIS OF RIGHT LOWER EXTREMITY: ICD-10-CM

## 2022-04-03 PROBLEM — D72.819 LEUKOCYTOPENIA: Status: ACTIVE | Noted: 2022-04-03

## 2022-04-03 PROBLEM — D72.829 LEUKOCYTOSIS: Status: ACTIVE | Noted: 2022-04-03

## 2022-04-03 LAB
ALBUMIN SERPL BCP-MCNC: 2.2 G/DL (ref 3.2–4.9)
ALBUMIN/GLOB SERPL: 0.5 G/DL
ALP SERPL-CCNC: 272 U/L (ref 30–99)
ALT SERPL-CCNC: 17 U/L (ref 2–50)
ANION GAP SERPL CALC-SCNC: 9 MMOL/L (ref 7–16)
AST SERPL-CCNC: 30 U/L (ref 12–45)
BASOPHILS # BLD AUTO: 0 % (ref 0–1.8)
BASOPHILS # BLD: 0 K/UL (ref 0–0.12)
BILIRUB SERPL-MCNC: 1.9 MG/DL (ref 0.1–1.5)
BUN SERPL-MCNC: 21 MG/DL (ref 8–22)
CALCIUM SERPL-MCNC: 8.2 MG/DL (ref 8.4–10.2)
CHLORIDE SERPL-SCNC: 93 MMOL/L (ref 96–112)
CO2 SERPL-SCNC: 25 MMOL/L (ref 20–33)
CREAT SERPL-MCNC: 0.49 MG/DL (ref 0.5–1.4)
CRP SERPL HS-MCNC: 52.09 MG/DL (ref 0–0.75)
EOSINOPHIL # BLD AUTO: 0.52 K/UL (ref 0–0.51)
EOSINOPHIL NFR BLD: 1 % (ref 0–6.9)
ERYTHROCYTE [DISTWIDTH] IN BLOOD BY AUTOMATED COUNT: 39.6 FL (ref 35.9–50)
GFR SERPLBLD CREATININE-BSD FMLA CKD-EPI: 110 ML/MIN/1.73 M 2
GLOBULIN SER CALC-MCNC: 4.6 G/DL (ref 1.9–3.5)
GLUCOSE SERPL-MCNC: 111 MG/DL (ref 65–99)
HCT VFR BLD AUTO: 33.8 % (ref 37–47)
HGB BLD-MCNC: 12 G/DL (ref 12–16)
HYPOCHROMIA BLD QL SMEAR: ABNORMAL
LACTATE BLD-SCNC: 2.5 MMOL/L (ref 0.5–2)
LACTATE BLD-SCNC: 3.7 MMOL/L (ref 0.5–2)
LYMPHOCYTES # BLD AUTO: 3.63 K/UL (ref 1–4.8)
LYMPHOCYTES NFR BLD: 7 % (ref 22–41)
MANUAL DIFF BLD: NORMAL
MCH RBC QN AUTO: 27.8 PG (ref 27–33)
MCHC RBC AUTO-ENTMCNC: 35.5 G/DL (ref 33.6–35)
MCV RBC AUTO: 78.2 FL (ref 81.4–97.8)
METAMYELOCYTES NFR BLD MANUAL: 1 %
MONOCYTES # BLD AUTO: 0.52 K/UL (ref 0–0.85)
MONOCYTES NFR BLD AUTO: 1 % (ref 0–13.4)
NEUTROPHILS # BLD AUTO: 46.62 K/UL (ref 2–7.15)
NEUTROPHILS NFR BLD: 83 % (ref 44–72)
NEUTS BAND NFR BLD MANUAL: 7 % (ref 0–10)
NRBC # BLD AUTO: 0.02 K/UL
NRBC BLD-RTO: 0 /100 WBC
PLATELET # BLD AUTO: 355 K/UL (ref 164–446)
PLATELET BLD QL SMEAR: NORMAL
PMV BLD AUTO: 9.4 FL (ref 9–12.9)
POLYCHROMASIA BLD QL SMEAR: NORMAL
POTASSIUM SERPL-SCNC: 4.9 MMOL/L (ref 3.6–5.5)
PROT SERPL-MCNC: 6.8 G/DL (ref 6–8.2)
RBC # BLD AUTO: 4.32 M/UL (ref 4.2–5.4)
RBC BLD AUTO: PRESENT
SODIUM SERPL-SCNC: 127 MMOL/L (ref 135–145)
TARGETS BLD QL SMEAR: NORMAL
TOXIC GRANULES BLD QL SMEAR: NORMAL
VARIANT LYMPHS BLD QL SMEAR: NORMAL
WBC # BLD AUTO: 51.8 K/UL (ref 4.8–10.8)

## 2022-04-03 PROCEDURE — 87205 SMEAR GRAM STAIN: CPT

## 2022-04-03 PROCEDURE — 87641 MR-STAPH DNA AMP PROBE: CPT

## 2022-04-03 PROCEDURE — 700101 HCHG RX REV CODE 250: Performed by: EMERGENCY MEDICINE

## 2022-04-03 PROCEDURE — 85007 BL SMEAR W/DIFF WBC COUNT: CPT

## 2022-04-03 PROCEDURE — 86140 C-REACTIVE PROTEIN: CPT

## 2022-04-03 PROCEDURE — 87070 CULTURE OTHR SPECIMN AEROBIC: CPT

## 2022-04-03 PROCEDURE — C1751 CATH, INF, PER/CENT/MIDLINE: HCPCS

## 2022-04-03 PROCEDURE — 700105 HCHG RX REV CODE 258: Performed by: EMERGENCY MEDICINE

## 2022-04-03 PROCEDURE — 71045 X-RAY EXAM CHEST 1 VIEW: CPT

## 2022-04-03 PROCEDURE — 87040 BLOOD CULTURE FOR BACTERIA: CPT

## 2022-04-03 PROCEDURE — 99223 1ST HOSP IP/OBS HIGH 75: CPT | Performed by: HOSPITALIST

## 2022-04-03 PROCEDURE — 85025 COMPLETE CBC W/AUTO DIFF WBC: CPT

## 2022-04-03 PROCEDURE — 87186 SC STD MICRODIL/AGAR DIL: CPT

## 2022-04-03 PROCEDURE — 700111 HCHG RX REV CODE 636 W/ 250 OVERRIDE (IP): Performed by: EMERGENCY MEDICINE

## 2022-04-03 PROCEDURE — 02HV33Z INSERTION OF INFUSION DEVICE INTO SUPERIOR VENA CAVA, PERCUTANEOUS APPROACH: ICD-10-PCS | Performed by: EMERGENCY MEDICINE

## 2022-04-03 PROCEDURE — 80053 COMPREHEN METABOLIC PANEL: CPT

## 2022-04-03 PROCEDURE — 700117 HCHG RX CONTRAST REV CODE 255: Performed by: EMERGENCY MEDICINE

## 2022-04-03 PROCEDURE — 96365 THER/PROPH/DIAG IV INF INIT: CPT

## 2022-04-03 PROCEDURE — 83605 ASSAY OF LACTIC ACID: CPT | Mod: 91

## 2022-04-03 PROCEDURE — 73701 CT LOWER EXTREMITY W/DYE: CPT | Mod: RT

## 2022-04-03 PROCEDURE — 700111 HCHG RX REV CODE 636 W/ 250 OVERRIDE (IP): Performed by: HOSPITALIST

## 2022-04-03 PROCEDURE — 36415 COLL VENOUS BLD VENIPUNCTURE: CPT

## 2022-04-03 PROCEDURE — 99285 EMERGENCY DEPT VISIT HI MDM: CPT

## 2022-04-03 PROCEDURE — 700105 HCHG RX REV CODE 258: Performed by: HOSPITALIST

## 2022-04-03 PROCEDURE — 93971 EXTREMITY STUDY: CPT | Mod: RT

## 2022-04-03 PROCEDURE — A9270 NON-COVERED ITEM OR SERVICE: HCPCS | Performed by: HOSPITALIST

## 2022-04-03 PROCEDURE — 700102 HCHG RX REV CODE 250 W/ 637 OVERRIDE(OP): Performed by: HOSPITALIST

## 2022-04-03 PROCEDURE — 96367 TX/PROPH/DG ADDL SEQ IV INF: CPT

## 2022-04-03 PROCEDURE — 36556 INSERT NON-TUNNEL CV CATH: CPT

## 2022-04-03 PROCEDURE — 96366 THER/PROPH/DIAG IV INF ADDON: CPT

## 2022-04-03 PROCEDURE — 87077 CULTURE AEROBIC IDENTIFY: CPT | Mod: 91

## 2022-04-03 PROCEDURE — 80503 PATH CLIN CONSLTJ SF 5-20: CPT

## 2022-04-03 PROCEDURE — 770001 HCHG ROOM/CARE - MED/SURG/GYN PRIV*

## 2022-04-03 PROCEDURE — 73590 X-RAY EXAM OF LOWER LEG: CPT | Mod: RT

## 2022-04-03 RX ORDER — OXYCODONE HYDROCHLORIDE 5 MG/1
2.5 TABLET ORAL
Status: DISCONTINUED | OUTPATIENT
Start: 2022-04-03 | End: 2022-04-06

## 2022-04-03 RX ORDER — SODIUM CHLORIDE, SODIUM LACTATE, POTASSIUM CHLORIDE, AND CALCIUM CHLORIDE .6; .31; .03; .02 G/100ML; G/100ML; G/100ML; G/100ML
500 INJECTION, SOLUTION INTRAVENOUS
Status: DISCONTINUED | OUTPATIENT
Start: 2022-04-03 | End: 2022-04-12 | Stop reason: HOSPADM

## 2022-04-03 RX ORDER — ALBUTEROL SULFATE 90 UG/1
1 AEROSOL, METERED RESPIRATORY (INHALATION)
Status: DISCONTINUED | OUTPATIENT
Start: 2022-04-03 | End: 2022-04-12 | Stop reason: HOSPADM

## 2022-04-03 RX ORDER — POLYETHYLENE GLYCOL 3350 17 G/17G
1 POWDER, FOR SOLUTION ORAL
Status: DISCONTINUED | OUTPATIENT
Start: 2022-04-03 | End: 2022-04-12 | Stop reason: HOSPADM

## 2022-04-03 RX ORDER — METHOCARBAMOL 500 MG/1
1000 TABLET, FILM COATED ORAL 4 TIMES DAILY PRN
Status: DISCONTINUED | OUTPATIENT
Start: 2022-04-03 | End: 2022-04-12 | Stop reason: HOSPADM

## 2022-04-03 RX ORDER — SODIUM CHLORIDE 9 MG/ML
INJECTION, SOLUTION INTRAVENOUS CONTINUOUS
Status: DISCONTINUED | OUTPATIENT
Start: 2022-04-03 | End: 2022-04-06

## 2022-04-03 RX ORDER — COVID-19 ANTIGEN TEST
660 KIT MISCELLANEOUS 2 TIMES DAILY PRN
Status: ON HOLD | COMMUNITY
End: 2022-06-17

## 2022-04-03 RX ORDER — OXYCODONE HYDROCHLORIDE 5 MG/1
5 TABLET ORAL
Status: DISCONTINUED | OUTPATIENT
Start: 2022-04-03 | End: 2022-04-06

## 2022-04-03 RX ORDER — BISACODYL 10 MG
10 SUPPOSITORY, RECTAL RECTAL
Status: DISCONTINUED | OUTPATIENT
Start: 2022-04-03 | End: 2022-04-12 | Stop reason: HOSPADM

## 2022-04-03 RX ORDER — SODIUM CHLORIDE, SODIUM LACTATE, POTASSIUM CHLORIDE, AND CALCIUM CHLORIDE .6; .31; .03; .02 G/100ML; G/100ML; G/100ML; G/100ML
30 INJECTION, SOLUTION INTRAVENOUS ONCE
Status: COMPLETED | OUTPATIENT
Start: 2022-04-03 | End: 2022-04-03

## 2022-04-03 RX ORDER — AMOXICILLIN 250 MG
2 CAPSULE ORAL 2 TIMES DAILY
Status: DISCONTINUED | OUTPATIENT
Start: 2022-04-04 | End: 2022-04-12 | Stop reason: HOSPADM

## 2022-04-03 RX ORDER — IPRATROPIUM BROMIDE AND ALBUTEROL SULFATE 2.5; .5 MG/3ML; MG/3ML
3 SOLUTION RESPIRATORY (INHALATION)
Status: DISCONTINUED | OUTPATIENT
Start: 2022-04-03 | End: 2022-04-12 | Stop reason: HOSPADM

## 2022-04-03 RX ORDER — FLUTICASONE PROPIONATE 44 UG/1
2 AEROSOL, METERED RESPIRATORY (INHALATION)
Status: DISCONTINUED | OUTPATIENT
Start: 2022-04-04 | End: 2022-04-12 | Stop reason: HOSPADM

## 2022-04-03 RX ORDER — ACETAMINOPHEN 325 MG/1
650 TABLET ORAL EVERY 6 HOURS PRN
Status: DISCONTINUED | OUTPATIENT
Start: 2022-04-03 | End: 2022-04-12 | Stop reason: HOSPADM

## 2022-04-03 RX ORDER — OXYCODONE AND ACETAMINOPHEN 10; 325 MG/1; MG/1
1 TABLET ORAL 4 TIMES DAILY PRN
Status: DISCONTINUED | OUTPATIENT
Start: 2022-04-03 | End: 2022-04-03

## 2022-04-03 RX ORDER — GABAPENTIN 300 MG/1
600 CAPSULE ORAL 4 TIMES DAILY
Status: DISCONTINUED | OUTPATIENT
Start: 2022-04-03 | End: 2022-04-12 | Stop reason: HOSPADM

## 2022-04-03 RX ORDER — HYDROMORPHONE HYDROCHLORIDE 1 MG/ML
0.25 INJECTION, SOLUTION INTRAMUSCULAR; INTRAVENOUS; SUBCUTANEOUS
Status: DISCONTINUED | OUTPATIENT
Start: 2022-04-03 | End: 2022-04-12 | Stop reason: HOSPADM

## 2022-04-03 RX ORDER — HEPARIN SODIUM 5000 [USP'U]/ML
5000 INJECTION, SOLUTION INTRAVENOUS; SUBCUTANEOUS EVERY 8 HOURS
Status: DISCONTINUED | OUTPATIENT
Start: 2022-04-04 | End: 2022-04-12 | Stop reason: HOSPADM

## 2022-04-03 RX ORDER — SODIUM CHLORIDE, SODIUM LACTATE, POTASSIUM CHLORIDE, AND CALCIUM CHLORIDE .6; .31; .03; .02 G/100ML; G/100ML; G/100ML; G/100ML
30 INJECTION, SOLUTION INTRAVENOUS
Status: DISCONTINUED | OUTPATIENT
Start: 2022-04-03 | End: 2022-04-03

## 2022-04-03 RX ORDER — MORPHINE SULFATE 15 MG/1
15 TABLET, FILM COATED, EXTENDED RELEASE ORAL EVERY 12 HOURS
Status: DISCONTINUED | OUTPATIENT
Start: 2022-04-03 | End: 2022-04-12 | Stop reason: HOSPADM

## 2022-04-03 RX ADMIN — PIPERACILLIN AND TAZOBACTAM 4.5 G: 4; .5 INJECTION, POWDER, LYOPHILIZED, FOR SOLUTION INTRAVENOUS; PARENTERAL at 22:07

## 2022-04-03 RX ADMIN — SODIUM CHLORIDE: 9 INJECTION, SOLUTION INTRAVENOUS at 22:06

## 2022-04-03 RX ADMIN — PIPERACILLIN AND TAZOBACTAM 4.5 G: 4; .5 INJECTION, POWDER, LYOPHILIZED, FOR SOLUTION INTRAVENOUS; PARENTERAL at 17:55

## 2022-04-03 RX ADMIN — VANCOMYCIN HYDROCHLORIDE 2500 MG: 500 INJECTION, POWDER, LYOPHILIZED, FOR SOLUTION INTRAVENOUS at 19:18

## 2022-04-03 RX ADMIN — IOHEXOL 100 ML: 350 INJECTION, SOLUTION INTRAVENOUS at 19:06

## 2022-04-03 RX ADMIN — MORPHINE SULFATE 15 MG: 15 TABLET, FILM COATED, EXTENDED RELEASE ORAL at 22:07

## 2022-04-03 RX ADMIN — SODIUM CHLORIDE, POTASSIUM CHLORIDE, SODIUM LACTATE AND CALCIUM CHLORIDE 1365 ML: 600; 310; 30; 20 INJECTION, SOLUTION INTRAVENOUS at 16:52

## 2022-04-03 RX ADMIN — OXYCODONE HYDROCHLORIDE 5 MG: 5 TABLET ORAL at 22:11

## 2022-04-03 ASSESSMENT — LIFESTYLE VARIABLES
TOTAL SCORE: 0
CONSUMPTION TOTAL: NEGATIVE
TOTAL SCORE: 0
HAVE YOU EVER FELT YOU SHOULD CUT DOWN ON YOUR DRINKING: NO
AVERAGE NUMBER OF DAYS PER WEEK YOU HAVE A DRINK CONTAINING ALCOHOL: 1
EVER HAD A DRINK FIRST THING IN THE MORNING TO STEADY YOUR NERVES TO GET RID OF A HANGOVER: NO
TOTAL SCORE: 0
ON A TYPICAL DAY WHEN YOU DRINK ALCOHOL HOW MANY DRINKS DO YOU HAVE: 1
ALCOHOL_USE: YES
HAVE PEOPLE ANNOYED YOU BY CRITICIZING YOUR DRINKING: NO
EVER FELT BAD OR GUILTY ABOUT YOUR DRINKING: NO
HOW MANY TIMES IN THE PAST YEAR HAVE YOU HAD 5 OR MORE DRINKS IN A DAY: 0

## 2022-04-03 ASSESSMENT — COGNITIVE AND FUNCTIONAL STATUS - GENERAL
DAILY ACTIVITIY SCORE: 19
HELP NEEDED FOR BATHING: A LITTLE
MOVING FROM LYING ON BACK TO SITTING ON SIDE OF FLAT BED: A LOT
MOVING TO AND FROM BED TO CHAIR: A LOT
MOBILITY SCORE: 13
DRESSING REGULAR UPPER BODY CLOTHING: A LITTLE
PERSONAL GROOMING: A LITTLE
TOILETING: A LITTLE
WALKING IN HOSPITAL ROOM: A LOT
DRESSING REGULAR LOWER BODY CLOTHING: A LITTLE
STANDING UP FROM CHAIR USING ARMS: A LOT
CLIMB 3 TO 5 STEPS WITH RAILING: A LOT
SUGGESTED CMS G CODE MODIFIER MOBILITY: CL
TURNING FROM BACK TO SIDE WHILE IN FLAT BAD: A LITTLE
SUGGESTED CMS G CODE MODIFIER DAILY ACTIVITY: CK

## 2022-04-03 ASSESSMENT — ENCOUNTER SYMPTOMS
SHORTNESS OF BREATH: 0
MYALGIAS: 0
COUGH: 0
VOMITING: 0
FEVER: 0
STRIDOR: 0
FOCAL WEAKNESS: 0
BRUISES/BLEEDS EASILY: 0
EYE REDNESS: 0
FLANK PAIN: 0
ABDOMINAL PAIN: 0
CHILLS: 1
EYE DISCHARGE: 0
NERVOUS/ANXIOUS: 0

## 2022-04-03 ASSESSMENT — FIBROSIS 4 INDEX
FIB4 SCORE: 1.15
FIB4 SCORE: 1.096460826971710805

## 2022-04-03 ASSESSMENT — PAIN DESCRIPTION - PAIN TYPE: TYPE: CHRONIC PAIN;ACUTE PAIN

## 2022-04-03 NOTE — ED TRIAGE NOTES
Pt BIB [facility]  C/o pain to R lower leg  Has chronic infection/wound to R lower leg that as been on going since 12/30/2021 for injury to leg by space heater   Has gotten worse   Today her little dog stepped on her hurt causing increased pain    States last wound care evaluation was this last Tuesday  Wound vac in placed  Foul smelling odor noted coming form said area

## 2022-04-03 NOTE — ED PROVIDER NOTES
"ED Provider Note    CHIEF COMPLAINT  Chief Complaint   Patient presents with   • Leg Pain     R LOWER LEG  HAS WOUND ON IT W/ WOUND VAC IN PLACE  DOG STEPPED ON SAME LEG  NOW HAVING PAIN       HPI  Karine Ovalle is a 56 y.o. female who presents with right lower leg pain and swelling.  The patient states that she burned her anterior aspect of the right leg at the end of December.  Subsequently she has had an open wound followed by wound care.  She was seen by wound care on 24 March and I reviewed the note and the wound care nurse noted the patient was noncompliant did remove the dressing.  She did have an increase in interval size of her ulceration from prior.  She is not currently on antibiotics.  She states she has not had any associated fevers.  She states that her dog stepped on her leg and she had increased pain since that time.  She has had increased swelling as well.  She also has a history of lymphedema.  She is on chronic pain medication for chronic back pain.    REVIEW OF SYSTEMS  See HPI for further details. All other systems are negative.     PAST MEDICAL HISTORY  Past Medical History:   Diagnosis Date   • Administrative encounter- RTC ACCESS/ADA PARATRANSIT ELIGIBILITY 6/4/2019   • Pain 02/04/2019    arthritic pain   • Pain 12/04/2018    \"ALL OVER\", 10/10   • Anemia    • Arthritis     osteo/hips and back   • At risk for falls    • Chronic back pain    • Dental disorder     lower denture   • Urinary incontinence     using pads       FAMILY HISTORY  [unfilled]    SOCIAL HISTORY  Social History     Socioeconomic History   • Marital status: Single   Tobacco Use   • Smoking status: Never Smoker   • Smokeless tobacco: Never Used   Vaping Use   • Vaping Use: Never used   Substance and Sexual Activity   • Alcohol use: Not Currently     Comment: 3-4 per week; pt stops alcohol use 1-week ago   • Drug use: No     Frequency: 4.0 times per week   • Sexual activity: Never   Social History Narrative    ** Merged " "History Encounter **            SURGICAL HISTORY  Past Surgical History:   Procedure Laterality Date   • HIP ARTHROPLASTY TOTAL Left 2/13/2019    Procedure: HIP ARTHROPLASTY TOTAL, Cabling of intra-operative calcar fracture;  Surgeon: Bryon Levine M.D.;  Location: [facility];  Service: Orthopedics   • CERVICAL FUSION POSTERIOR  12/7/2018    Procedure: CERVICAL FUSION POSTERIOR- STAGE #2 C3-5 AND C3-T1;  Surgeon: Emre Mnedoza III, M.D.;  Location: SURGERY [facility];  Service: Neurosurgery   • CERVICAL LAMINECTOMY POSTERIOR  12/7/2018    Procedure: CERVICAL LAMINECTOMY POSTERIOR C2-T1;  Surgeon: Emre Mendoza III, M.D.;  Location: SURGERY [facility];  Service: Neurosurgery   • CERVICAL DISK AND FUSION ANTERIOR  12/5/2018    Procedure: CERVICAL DISK AND FUSION ANTERIOR-STAGE #1 C4-5;  Surgeon: Emre Mendoza III, M.D.;  Location: [facility];  Service: Neurosurgery   • CORPECTOMY  12/5/2018    Procedure: CORPECTOMY;  Surgeon: Emre Mendoza III, M.D.;  Location: [facility];  Service: Neurosurgery   • HIP ARTHROPLASTY TOTAL Right 3/20/2018    Procedure: HIP ARTHROPLASTY TOTAL;  Surgeon: Bryon Levine M.D.;  Location: [facility];  Service: Orthopedics   • KNEE ARTHROPLASTY TOTAL Right 10/20/2015    Procedure: KNEE ARTHROPLASTY TOTAL;  Surgeon: Bryon Levine M.D.;  Location: [facility];  Service:    • APPENDECTOMY LAPAROSCOPIC  3/21/2013    Performed by Dali Queen M.D. at [facility]   • DEBRIDEMENT  5/17/2012    Performed by BRADLEY DYKES at [facility]   • PLASTIC SURGERY  2004    excess skin on arms and legs removed   • MAMMOPLASTY AUGMENTATION Bilateral 2004    breast implants and \"tummy tuck\"   • GASTRIC BYPASS LAPAROSCOPIC  2002    x 2   • ABDOMINAL EXPLORATION     • OTHER      breast augmentation 2004   • OTHER      Gastric bypass 2002       CURRENT MEDICATIONS  Home Medications    **Home medications " have not yet been reviewed for this encounter**         ALLERGIES  Allergies   Allergen Reactions   • Tobacco [Nicotiana Tabacum] Shortness of Breath     Cigarette smoke causes SOB, rispatory issues       PHYSICAL EXAM  VITAL SIGNS: BP (!) 99/65   Pulse (!) 104   Temp 37 °C (98.6 °F) (Temporal)   Resp 16   Ht 1.524 m (5')   Wt 104 kg (230 lb)   LMP  (LMP Unknown)   SpO2 90%   BMI 44.92 kg/m²       Constitutional: Chronically ill in appearance  HENT: Normocephalic, Atraumatic, Bilateral external ears normal, Oropharynx moist, No oral exudates, Nose normal.   Eyes: PERRLA, EOMI, Conjunctiva normal, No discharge.   Neck: Normal range of motion, No tenderness, Supple, No stridor.   Lymphatic: No lymphadenopathy noted.   Cardiovascular: Tachycardic heart rate, Normal rhythm, No murmurs, No rubs, No gallops.   Thorax & Lungs: Normal breath sounds, No respiratory distress, No wheezing, No chest tenderness.   Abdomen: Bowel sounds normal, Soft, No tenderness, No masses, No pulsatile masses.   Skin: Wound VAC in place to the right lower extremity  Back: No tenderness, No CVA tenderness.   Extremities: The wound VAC in place described above, erythema and induration extending up to the thigh laterally on the right intact distal pulses, diffuse right lower extremity edema, No tenderness, No cyanosis, No clubbing.    Neurologic: Alert & oriented x 3, Normal motor function, Normal sensory function, No focal deficits noted.   Psychiatric: Affect normal, Judgment normal, Mood normal.     Results for orders placed or performed during the hospital encounter of 04/03/22   CBC With Differential   Result Value Ref Range    WBC 51.8 (HH) 4.8 - 10.8 K/uL    RBC 4.32 4.20 - 5.40 M/uL    Hemoglobin 12.0 12.0 - 16.0 g/dL    Hematocrit 33.8 (L) 37.0 - 47.0 %    MCV 78.2 (L) 81.4 - 97.8 fL    MCH 27.8 27.0 - 33.0 pg    MCHC 35.5 (H) 33.6 - 35.0 g/dL    RDW 39.6 35.9 - 50.0 fL    Platelet Count 355 164 - 446 K/uL    MPV 9.4 9.0 - 12.9  fL    Nucleated RBC 0.00 /100 WBC    NRBC (Absolute) 0.02 K/uL   Comp Metabolic Panel   Result Value Ref Range    Sodium 127 (L) 135 - 145 mmol/L    Potassium 4.9 3.6 - 5.5 mmol/L    Chloride 93 (L) 96 - 112 mmol/L    Co2 25 20 - 33 mmol/L    Anion Gap 9.0 7.0 - 16.0    Glucose 111 (H) 65 - 99 mg/dL    Bun 21 8 - 22 mg/dL    Creatinine 0.49 (L) 0.50 - 1.40 mg/dL    Calcium 8.2 (L) 8.4 - 10.2 mg/dL    AST(SGOT) 30 12 - 45 U/L    ALT(SGPT) 17 2 - 50 U/L    Alkaline Phosphatase 272 (H) 30 - 99 U/L    Total Bilirubin 1.9 (H) 0.1 - 1.5 mg/dL    Albumin 2.2 (L) 3.2 - 4.9 g/dL    Total Protein 6.8 6.0 - 8.2 g/dL    Globulin 4.6 (H) 1.9 - 3.5 g/dL    A-G Ratio 0.5 g/dL   C Reactive Protein Quantitative (Non-Cardiac)   Result Value Ref Range    Stat C-Reactive Protein 52.09 (H) 0.00 - 0.75 mg/dL   LACTIC ACID   Result Value Ref Range    Lactic Acid 2.5 (H) 0.5 - 2.0 mmol/L   ESTIMATED GFR   Result Value Ref Range    GFR (CKD-EPI) 110 >60 mL/min/1.73 m 2     *Note: Due to a large number of results and/or encounters for the requested time period, some results have not been displayed. A complete set of results can be found in Results Review.         RADIOLOGY/PROCEDURES  DX-TIBIA AND FIBULA RIGHT   Final Result      1.  Large ulcer/wound in the anterior mid right leg.   2.  No acute osseous abnormality.      US-EXTREMITY VENOUS LOWER UNILAT RIGHT   Final Result      1.  No evidence of Right  lower extremity deep venous thrombosis.      2.  Exam limited due to extensive subcutaneous edema.            COURSE & MEDICAL DECISION MAKING  Pertinent Labs & Imaging studies reviewed. (See chart for details)  This a 56-year-old female who presents the emergency room with right leg pain.  The patient's leg does acutely appear infected.  I did remove the wound VAC and the patient does have purulent drainage from another wound that is lateral to the large wound on the right leg.  The patient has a very concerning leukocytosis.  She also  presented hypotensive and tachycardic consistent with sepsis.  Therefore she was treated aggressively with IV fluids and her hemodynamics have improved.  She received vancomycin for antibiotic coverage.  I do have a Gram stain and culture pending.  The patient's lactic acid is mildly elevated.  Second lactic acid is currently pending.  The patient does appear better at the time of admission.    FINAL IMPRESSION  1.  Open wound right lower extremity with secondary abscess formation  2.  Rule out sepsis  3.  Critical care time 30 minutes    Disposition  The patient will be admitted in guarded condition         Electronically signed by: Jatin Arguelles M.D., 4/3/2022 4:26 PM

## 2022-04-04 ENCOUNTER — NON-PROVIDER VISIT (OUTPATIENT)
Dept: WOUND CARE | Facility: MEDICAL CENTER | Age: 57
End: 2022-04-04
Attending: NURSE PRACTITIONER
Payer: COMMERCIAL

## 2022-04-04 ENCOUNTER — APPOINTMENT (OUTPATIENT)
Dept: PHYSICAL THERAPY | Facility: REHABILITATION | Age: 57
End: 2022-04-04
Attending: NURSE PRACTITIONER
Payer: COMMERCIAL

## 2022-04-04 DIAGNOSIS — L03.115 CELLULITIS OF RIGHT LOWER EXTREMITY: ICD-10-CM

## 2022-04-04 PROBLEM — S81.801A OPEN WOUND OF RIGHT LOWER EXTREMITY: Status: ACTIVE | Noted: 2022-04-04

## 2022-04-04 LAB
ALBUMIN SERPL BCP-MCNC: 2 G/DL (ref 3.2–4.9)
ALBUMIN/GLOB SERPL: 0.5 G/DL
ALP SERPL-CCNC: 321 U/L (ref 30–99)
ALT SERPL-CCNC: 15 U/L (ref 2–50)
ANION GAP SERPL CALC-SCNC: 9 MMOL/L (ref 7–16)
AST SERPL-CCNC: 25 U/L (ref 12–45)
BASOPHILS # BLD AUTO: 0 % (ref 0–1.8)
BASOPHILS # BLD: 0 K/UL (ref 0–0.12)
BILIRUB SERPL-MCNC: 2.2 MG/DL (ref 0.1–1.5)
BUN SERPL-MCNC: 13 MG/DL (ref 8–22)
CALCIUM SERPL-MCNC: 7.8 MG/DL (ref 8.4–10.2)
CHLORIDE SERPL-SCNC: 96 MMOL/L (ref 96–112)
CO2 SERPL-SCNC: 24 MMOL/L (ref 20–33)
CREAT SERPL-MCNC: 0.41 MG/DL (ref 0.5–1.4)
EOSINOPHIL # BLD AUTO: 0.48 K/UL (ref 0–0.51)
EOSINOPHIL NFR BLD: 1 % (ref 0–6.9)
ERYTHROCYTE [DISTWIDTH] IN BLOOD BY AUTOMATED COUNT: 39.1 FL (ref 35.9–50)
GFR SERPLBLD CREATININE-BSD FMLA CKD-EPI: 115 ML/MIN/1.73 M 2
GLOBULIN SER CALC-MCNC: 4.3 G/DL (ref 1.9–3.5)
GLUCOSE SERPL-MCNC: 88 MG/DL (ref 65–99)
GRAM STN SPEC: NORMAL
HCT VFR BLD AUTO: 31.6 % (ref 37–47)
HGB BLD-MCNC: 11.2 G/DL (ref 12–16)
HYPOCHROMIA BLD QL SMEAR: ABNORMAL
LACTATE BLD-SCNC: 1.2 MMOL/L (ref 0.5–2)
LACTATE BLD-SCNC: 1.3 MMOL/L (ref 0.5–2)
LACTATE BLD-SCNC: 5.5 MMOL/L (ref 0.5–2)
LYMPHOCYTES # BLD AUTO: 3.35 K/UL (ref 1–4.8)
LYMPHOCYTES NFR BLD: 7 % (ref 22–41)
MAGNESIUM SERPL-MCNC: 2.5 MG/DL (ref 1.5–2.5)
MANUAL DIFF BLD: NORMAL
MCH RBC QN AUTO: 27.5 PG (ref 27–33)
MCHC RBC AUTO-ENTMCNC: 35.4 G/DL (ref 33.6–35)
MCV RBC AUTO: 77.6 FL (ref 81.4–97.8)
METAMYELOCYTES NFR BLD MANUAL: 1 %
MONOCYTES # BLD AUTO: 1.43 K/UL (ref 0–0.85)
MONOCYTES NFR BLD AUTO: 3 % (ref 0–13.4)
MYELOCYTES NFR BLD MANUAL: 1 %
NEUTROPHILS # BLD AUTO: 41.59 K/UL (ref 2–7.15)
NEUTROPHILS NFR BLD: 79 % (ref 44–72)
NEUTS BAND NFR BLD MANUAL: 8 % (ref 0–10)
NRBC # BLD AUTO: 0 K/UL
NRBC BLD-RTO: 0 /100 WBC
PATH REV: NORMAL
PATH REV: NORMAL
PLATELET # BLD AUTO: 357 K/UL (ref 164–446)
PLATELET BLD QL SMEAR: NORMAL
PMV BLD AUTO: 9.3 FL (ref 9–12.9)
POLYCHROMASIA BLD QL SMEAR: NORMAL
POTASSIUM SERPL-SCNC: 4.1 MMOL/L (ref 3.6–5.5)
PROT SERPL-MCNC: 6.3 G/DL (ref 6–8.2)
RBC # BLD AUTO: 4.07 M/UL (ref 4.2–5.4)
RBC BLD AUTO: PRESENT
SCCMEC + MECA PNL NOSE NAA+PROBE: NEGATIVE
SIGNIFICANT IND 70042: NORMAL
SITE SITE: NORMAL
SODIUM SERPL-SCNC: 129 MMOL/L (ref 135–145)
SOURCE SOURCE: NORMAL
TARGETS BLD QL SMEAR: NORMAL
TOXIC GRANULES BLD QL SMEAR: NORMAL
VARIANT LYMPHS BLD QL SMEAR: NORMAL
WBC # BLD AUTO: 47.8 K/UL (ref 4.8–10.8)

## 2022-04-04 PROCEDURE — 700111 HCHG RX REV CODE 636 W/ 250 OVERRIDE (IP): Performed by: HOSPITALIST

## 2022-04-04 PROCEDURE — 700101 HCHG RX REV CODE 250: Performed by: HOSPITALIST

## 2022-04-04 PROCEDURE — A9270 NON-COVERED ITEM OR SERVICE: HCPCS | Performed by: HOSPITALIST

## 2022-04-04 PROCEDURE — 94640 AIRWAY INHALATION TREATMENT: CPT

## 2022-04-04 PROCEDURE — 83605 ASSAY OF LACTIC ACID: CPT | Mod: 91

## 2022-04-04 PROCEDURE — A9270 NON-COVERED ITEM OR SERVICE: HCPCS | Performed by: STUDENT IN AN ORGANIZED HEALTH CARE EDUCATION/TRAINING PROGRAM

## 2022-04-04 PROCEDURE — 700105 HCHG RX REV CODE 258: Performed by: INTERNAL MEDICINE

## 2022-04-04 PROCEDURE — 97602 WOUND(S) CARE NON-SELECTIVE: CPT

## 2022-04-04 PROCEDURE — 99233 SBSQ HOSP IP/OBS HIGH 50: CPT | Performed by: STUDENT IN AN ORGANIZED HEALTH CARE EDUCATION/TRAINING PROGRAM

## 2022-04-04 PROCEDURE — 85025 COMPLETE CBC W/AUTO DIFF WBC: CPT

## 2022-04-04 PROCEDURE — 83735 ASSAY OF MAGNESIUM: CPT

## 2022-04-04 PROCEDURE — 85007 BL SMEAR W/DIFF WBC COUNT: CPT

## 2022-04-04 PROCEDURE — 94760 N-INVAS EAR/PLS OXIMETRY 1: CPT

## 2022-04-04 PROCEDURE — 700105 HCHG RX REV CODE 258: Performed by: HOSPITALIST

## 2022-04-04 PROCEDURE — 700102 HCHG RX REV CODE 250 W/ 637 OVERRIDE(OP): Performed by: HOSPITALIST

## 2022-04-04 PROCEDURE — 700101 HCHG RX REV CODE 250: Performed by: STUDENT IN AN ORGANIZED HEALTH CARE EDUCATION/TRAINING PROGRAM

## 2022-04-04 PROCEDURE — 80053 COMPREHEN METABOLIC PANEL: CPT

## 2022-04-04 PROCEDURE — 700102 HCHG RX REV CODE 250 W/ 637 OVERRIDE(OP): Performed by: STUDENT IN AN ORGANIZED HEALTH CARE EDUCATION/TRAINING PROGRAM

## 2022-04-04 PROCEDURE — 770006 HCHG ROOM/CARE - MED/SURG/GYN SEMI*

## 2022-04-04 RX ORDER — METHOCARBAMOL 500 MG/1
500 TABLET, FILM COATED ORAL TWICE DAILY
Status: DISCONTINUED | OUTPATIENT
Start: 2022-04-04 | End: 2022-04-12 | Stop reason: HOSPADM

## 2022-04-04 RX ORDER — LIDOCAINE HYDROCHLORIDE 20 MG/ML
20 INJECTION, SOLUTION INFILTRATION; PERINEURAL
Status: DISCONTINUED | OUTPATIENT
Start: 2022-04-04 | End: 2022-04-12 | Stop reason: HOSPADM

## 2022-04-04 RX ORDER — SODIUM CHLORIDE 9 MG/ML
1000 INJECTION, SOLUTION INTRAVENOUS ONCE
Status: COMPLETED | OUTPATIENT
Start: 2022-04-04 | End: 2022-04-04

## 2022-04-04 RX ORDER — SODIUM HYPOCHLORITE 1.25 MG/ML
SOLUTION TOPICAL 2 TIMES DAILY
Status: DISCONTINUED | OUTPATIENT
Start: 2022-04-04 | End: 2022-04-04

## 2022-04-04 RX ORDER — SODIUM HYPOCHLORITE 1.25 MG/ML
SOLUTION TOPICAL 2 TIMES DAILY
Status: DISCONTINUED | OUTPATIENT
Start: 2022-04-04 | End: 2022-04-11 | Stop reason: ALTCHOICE

## 2022-04-04 RX ORDER — LIDOCAINE HYDROCHLORIDE 40 MG/ML
SOLUTION TOPICAL
Status: DISCONTINUED | OUTPATIENT
Start: 2022-04-04 | End: 2022-04-12 | Stop reason: HOSPADM

## 2022-04-04 RX ADMIN — MORPHINE SULFATE 15 MG: 15 TABLET, FILM COATED, EXTENDED RELEASE ORAL at 08:19

## 2022-04-04 RX ADMIN — MORPHINE SULFATE 15 MG: 15 TABLET, FILM COATED, EXTENDED RELEASE ORAL at 17:41

## 2022-04-04 RX ADMIN — HEPARIN SODIUM 5000 UNITS: 5000 INJECTION INTRAVENOUS; SUBCUTANEOUS at 14:05

## 2022-04-04 RX ADMIN — VANCOMYCIN HYDROCHLORIDE 1250 MG: 500 INJECTION, POWDER, LYOPHILIZED, FOR SOLUTION INTRAVENOUS at 08:13

## 2022-04-04 RX ADMIN — PIPERACILLIN AND TAZOBACTAM 4.5 G: 4; .5 INJECTION, POWDER, LYOPHILIZED, FOR SOLUTION INTRAVENOUS; PARENTERAL at 05:22

## 2022-04-04 RX ADMIN — GABAPENTIN 600 MG: 300 CAPSULE ORAL at 05:22

## 2022-04-04 RX ADMIN — FLUTICASONE PROPIONATE 88 MCG: 44 AEROSOL, METERED RESPIRATORY (INHALATION) at 07:45

## 2022-04-04 RX ADMIN — GABAPENTIN 600 MG: 300 CAPSULE ORAL at 17:41

## 2022-04-04 RX ADMIN — SODIUM HYPOCHLORITE 1 ML: 1.25 SOLUTION TOPICAL at 09:40

## 2022-04-04 RX ADMIN — GABAPENTIN 600 MG: 300 CAPSULE ORAL at 12:38

## 2022-04-04 RX ADMIN — SODIUM CHLORIDE 1000 ML: 9 INJECTION, SOLUTION INTRAVENOUS at 00:30

## 2022-04-04 RX ADMIN — Medication 1 TABLET: at 05:22

## 2022-04-04 RX ADMIN — PIPERACILLIN AND TAZOBACTAM 4.5 G: 4; .5 INJECTION, POWDER, LYOPHILIZED, FOR SOLUTION INTRAVENOUS; PARENTERAL at 21:02

## 2022-04-04 RX ADMIN — SODIUM CHLORIDE: 9 INJECTION, SOLUTION INTRAVENOUS at 17:47

## 2022-04-04 RX ADMIN — FLUTICASONE PROPIONATE 88 MCG: 44 AEROSOL, METERED RESPIRATORY (INHALATION) at 20:02

## 2022-04-04 RX ADMIN — OXYCODONE HYDROCHLORIDE 5 MG: 5 TABLET ORAL at 17:42

## 2022-04-04 RX ADMIN — OXYCODONE HYDROCHLORIDE 5 MG: 5 TABLET ORAL at 12:49

## 2022-04-04 RX ADMIN — OXYCODONE HYDROCHLORIDE 5 MG: 5 TABLET ORAL at 21:01

## 2022-04-04 RX ADMIN — METHOCARBAMOL 500 MG: 500 TABLET ORAL at 14:07

## 2022-04-04 RX ADMIN — GABAPENTIN 600 MG: 300 CAPSULE ORAL at 21:01

## 2022-04-04 RX ADMIN — OXYCODONE HYDROCHLORIDE 5 MG: 5 TABLET ORAL at 02:27

## 2022-04-04 RX ADMIN — SODIUM CHLORIDE: 9 INJECTION, SOLUTION INTRAVENOUS at 05:23

## 2022-04-04 RX ADMIN — HEPARIN SODIUM 5000 UNITS: 5000 INJECTION INTRAVENOUS; SUBCUTANEOUS at 21:02

## 2022-04-04 RX ADMIN — PIPERACILLIN AND TAZOBACTAM 4.5 G: 4; .5 INJECTION, POWDER, LYOPHILIZED, FOR SOLUTION INTRAVENOUS; PARENTERAL at 12:38

## 2022-04-04 RX ADMIN — OXYCODONE HYDROCHLORIDE 5 MG: 5 TABLET ORAL at 08:10

## 2022-04-04 RX ADMIN — SODIUM HYPOCHLORITE 15 ML: 1.25 SOLUTION TOPICAL at 19:25

## 2022-04-04 ASSESSMENT — ENCOUNTER SYMPTOMS
EYE PAIN: 0
DIARRHEA: 0
SHORTNESS OF BREATH: 0
BACK PAIN: 1
MYALGIAS: 1
VOMITING: 0
WHEEZING: 0
CHILLS: 0
FEVER: 0
SORE THROAT: 0
ABDOMINAL PAIN: 0
NAUSEA: 0
HEADACHES: 0

## 2022-04-04 ASSESSMENT — PAIN DESCRIPTION - PAIN TYPE
TYPE: ACUTE PAIN

## 2022-04-04 NOTE — ASSESSMENT & PLAN NOTE
she burned her anterior aspect of the right leg at the end of December.  Subsequently she has had an open wound followed by wound care.   Noticed worsening wound per wound care  Previous wound culture 2/2022 positive for Pseudomonas aeruginosa, MSSA, Morganella morganii     Continue Zosyn  wound care consulted  Discussed with ID, continue zosyn for now, f/u wound cx  Ortho consulted, no need for surgical debridement

## 2022-04-04 NOTE — DOCUMENTATION QUERY
[facility]                                                                       Query Response Note      PATIENT:               ANA LAWLER  ACCT #:                  [id number]  MRN:                     [id number]  :                      1965  ADMIT DATE:       4/3/2022 3:56 PM  DISCH DATE:          RESPONDING  PROVIDER #:        [id number]           QUERY TEXT:    BMI 44.92 is documented in the Medical Record.  Can a diagnosis be provided to support this finding?    NOTE:  If an appropriate response is not listed below, please respond with a new note.    The patient's Clinical Indicators include:  - Findings:  BMI 44.92 per medical record     - Treatments:  daily weight, regular diet, I&O     - Risk factors:  cellulitis, sepsis, chronic pain, noncompliance, dorsalgia     Thank You,  Aleta Felipe RN  Clinical Documentation   [email or web address]  Connect via [email or web address] Messenger  Options provided:   -- Obese   -- Morbidly obese   -- Overweight   -- Findings of no clinical significance   -- Unable to determine      Query created by: Aleta Felipe on 2022 10:07 AM    RESPONSE TEXT:    Obese          Electronically signed by:  ABDI HERNANDEZ MD 2022 11:34 AM

## 2022-04-04 NOTE — PROGRESS NOTES
Received patient from Night shift RN . Patient is awake and alert. Complains of pain to right lower leg, due prn pain medication given as ordered. Swollen legs and arms noted.  Fall precaution observed, kept bed in lowest position and call light within reach.

## 2022-04-04 NOTE — RESPIRATORY CARE
"   COPD EDUCATION by COPD CLINICAL EDUCATOR  4/4/2022 at 9:08 AM by Flori Hammond, RRT     Patient reviewed by COPD education team. Patient does not have a history or diagnosis of COPD and is a non-smoker.  Therefore, patient does not qualify for the COPD program.    COPD Screen       COPD Assessment  COPD Clinical Specialists ONLY  COPD Education Initiated: No--Quick Screen (Pt has no hx dx COPD, is a long time NEVER Smoker, hx Asthma)  Is this a COPD exacerbation patient?: No    PFT Results    No results found for: PFT    Meds to Beds  Would the patient like to opt in for Bedside Medication Delivery at Discharge?: No     MY COPD ACTION PLAN     It is recommended that patients and physicians /healthcare providers complete this action plan together. This plan should be discussed at each physician visit and updated as needed.    The green, yellow and red zones show groups of symptoms of COPD. This list of symptoms is not comprehensive, and you may experience other symptoms. In the \"Actions\" column, your healthcare provider has recommended actions for you to take based on your symptoms.    Patient Name: Karine Ovalle   YOB: 1965   Last Updated on:     Green Zone:  I am doing well today Actions   •  Usual activitiy and exercise level •  Take daily medications   •  Usual amounts of cough and phlegm/mucus •  Use oxygen as prescribed   •  Sleep well at night •  Continue regular exercise/diet plan   •  Appetite is good •  At all times avoid cigarette smoke, inhaled irritants     Daily Medications (these medications are taken every day):                Yellow Zone:  I am having a bad day or a COPD flare Actions   •  More breathless than usual •  Continue daily medications   •  I have less energy for my daily activities •  Use quick relief inhaler as ordered   •  Increased or thicker phlegm/mucus •  Use oxygen as prescribed   •  Using quick relief inhaler/nebulizer more often •  Get plenty of rest " "  •  Swelling of ankles more than usual •  Use pursed lip breathing   •  More coughing than usual •  At all times avoid cigarette smoke, inhaled irritants   •  I feel like I have a \"chest cold\"   •  Poor sleep and my symptoms woke me up   •  My appetite is not good   •  My medicine is not helping    •  Call provider immediately if symptoms don’t improve     Continue daily medications, add rescue medications:               Medications to be used during a flare up, (as Discussed with Provider):              Red Zone:  I need urgent medical care Actions   •  Severe shortness of breath even at rest •  Call 911 or seek medical care immediately   •  Not able to do any activity because of breathing    •  Fever or shaking chills    •  Feeling confused or very drowsy     •  Chest pains    •  Coughing up blood              "

## 2022-04-04 NOTE — ASSESSMENT & PLAN NOTE
This is Sepsis Present on admission  SIRS criteria identified on my evaluation include: Tachycardia, with heart rate greater than 90 BPM and Leukocytosis, with WBC greater than 12,000  Source is cellulitis  Sepsis protocol initiated  Fluid resuscitation ordered per protocol  Crystalloid Fluid Administration: Fluid resuscitation ordered per standard protocol - 30 mL/kg per current or ideal body weight  IV antibiotics as appropriate for source of sepsis  Reassessment: I have reassessed the patient's hemodynamic status    Resolved

## 2022-04-04 NOTE — ASSESSMENT & PLAN NOTE
Marked leukocytosis.  Likely secondary to cellulitis and wound infection  Per ID and ortho likely wound not causing significant leukocytosis   Blood culture, wound culture pending  HIV and procal pending   Consider oncology consult if not improving    CT scan of the right lower extremity negative for abscess and osteomyelitis  Continue Zosyn    4/8 white count is downtrending.  Procalcitonin continues to be elevated.  Wound reevaluated does not appear to have underlying abscess.  For now continue antibiotics and trend WBC.     4/10: WBC improving off antibiotics.  Repeat CT did not show an abscess
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
## 2022-04-04 NOTE — ED PROVIDER NOTES
ED Provider Note    Central Line Placement Procedure Note  Indication: vascular access    Consent: The patient was counseled regarding the procedure, its indications, risks, potential complications and alternatives, and any questions were answered. Consent was obtained to proceed.    Procedure: The patient was positioned appropriately and the skin over the right internal jugular vein was prepped with betadine and draped in a sterile fashion. Local anesthesia was obtained by infiltration using 1% Lidocaine without epinephrine.  A large bore needle was used to identify the vein.  A guide wire was then inserted into the vein through the needle. A triple lumen catheter was then inserted into the vessel over the guide wire using the Seldinger technique.  All ports showed good, free flowing blood return and were flushed with saline solution.  The catheter was then securely fastened to the skin with sutures and covered with a sterile dressing.  A post procedure X-ray was ordered and showed good line position.    The patient tolerated the procedure well.    Complications: None

## 2022-04-04 NOTE — PROGRESS NOTES
Critical lactic acid of 5.5 reported to on-call Hospitalist. Received new orders. Will closely monitor patient.

## 2022-04-04 NOTE — PROGRESS NOTES
4 Eyes Skin Assessment Completed    Head WDL   Face Redness, Swelling, Shiny and Edema  Ears WDL  Nose WDL  Mouth WDL  Neck WDL  Breast/Chest WDL  Shoulder Blades WDL  Spine WDL  (R) Arm/Elbow/Hand Edema  (L) Arm/Elbow/Hand Edema  Abdomen WDL  Groin WDL  Scrotum/Coccyx/Buttocks WDL  (R) Leg Redness, Swelling, Shiny and Edema  (L) Leg Swelling and Edema  (R) Heel/Foot/Toe Redness, Swelling, Shiny and Edema  (L) Heel/Foot/Toe Edema          Devices In Places Central Line      Interventions In Place Pillows and Pressure Redistribution Mattress    Possible Skin Injury Yes    Pictures Uploaded Into Epic No, needs to be completed  Wound Consult Placed Yes  RN Wound Prevention Protocol Ordered No

## 2022-04-04 NOTE — PROGRESS NOTES
I was paged to evaluate this 56 years old female with a past medical history of bilateral lower extremity lymphedema, mild intermittent asthma with chronic pain who presented 4/3/2022 with worsening right lower extremity pain and swelling.    Patient has massive swelling of her right lower extremity, with sepsis WBC 51.8, , CRP 52, Lactic acidosis     Has a Large ulcer/wound in the anterior mid right leg.  Will check right lower extremity CAT scan to rule out underlying abscess that may need urgent drainage.

## 2022-04-04 NOTE — ASSESSMENT & PLAN NOTE
Extensive edema of the right lower extremity, will check CT scan to rule out underlying abscess  Consider restarting home diuretics when blood pressure is more stable

## 2022-04-04 NOTE — ASSESSMENT & PLAN NOTE
Not currently on scheduled medications.  I will start as needed hydralazine labetalol for extreme hypertension

## 2022-04-04 NOTE — CARE PLAN
The patient is Stable - Low risk of patient condition declining or worsening    Shift Goals  Clinical Goals: patient will have mild pain to RLE  Patient Goals: pain mngt    Progress made toward(s) clinical / shift goals:  Still patient ask for pain medication to RLE,pain medication given as ordered PRN. Currently,patient is comfortably sleeping.    Patient is not progressing towards the following goals:    Problem: Pain - Standard  Goal: Alleviation of pain or a reduction in pain to the patient’s comfort goal  Outcome: Progressing     Problem: Knowledge Deficit - Standard  Goal: Patient and family/care givers will demonstrate understanding of plan of care, disease process/condition, diagnostic tests and medications  Outcome: Progressing     Problem: Urinary - Renal Perfusion  Goal: Ability to achieve and maintain adequate renal perfusion and functioning will improve  Outcome: Progressing     Problem: Mechanical Ventilation  Goal: Patient will be able to express needs and understand communication  Outcome: Progressing     Problem: Physical Regulation  Goal: Diagnostic test results will improve  Outcome: Progressing     Problem: Physical Regulation  Goal: Signs and symptoms of infection will decrease  Outcome: Progressing     Problem: Fall Risk  Goal: Patient will remain free from falls  Outcome: Progressing     Problem: Skin Integrity  Goal: Skin integrity is maintained or improved  Outcome: Progressing

## 2022-04-04 NOTE — ED NOTES
Assumed patient care while the primary is on lunch break.     Patient currently in CT Scan department

## 2022-04-04 NOTE — NON-PROVIDER
Patient currently in hospital; admitted 4/3/22 due to leg pain after patient's dog stepped on right lower leg. Patient admitted with sepsis, cellulitis, HTN, Edema, Leukocytosis, hyponatremia.
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
## 2022-04-04 NOTE — H&P
Hospital Medicine History & Physical Note    Date of Service  4/3/2022    Primary Care Physician  Micky Rubin M.D.    Consultants  None     Code Status  Full Code    Chief Complaint  Chief Complaint   Patient presents with   • Leg Pain     R LOWER LEG  HAS WOUND ON IT W/ WOUND VAC IN PLACE  DOG STEPPED ON SAME LEG  NOW HAVING PAIN     History of Presenting Illness  Karine Ovalle is a 56 y.o. female with a past medical history of bilateral lower extremity lymphedema, mild intermittent asthma with chronic pain who presented 4/3/2022 with worsening right lower extremity pain and swelling.  Patient reports that the pain is severe rated as 10 out of 10.  No radiation to other places.  Pain is worse with attempting to move her extremity.  She denies having fevers but reports having intermittent chills.  Denies cough and shortness of breath cough.      I discussed the plan of care with emergency department physician, the patient    Review of Systems  Review of Systems   Constitutional: Positive for chills and malaise/fatigue. Negative for fever.   Eyes: Negative for discharge and redness.   Respiratory: Negative for cough, shortness of breath and stridor.    Cardiovascular: Negative for chest pain and leg swelling.   Gastrointestinal: Negative for abdominal pain and vomiting.   Genitourinary: Negative for flank pain.   Musculoskeletal: Negative for myalgias.        Right lower extremity pain and swelling   Skin: Negative.    Neurological: Negative for focal weakness.   Endo/Heme/Allergies: Does not bruise/bleed easily.   Psychiatric/Behavioral: The patient is not nervous/anxious.      Past Medical History   has a past medical history of Administrative encounter- RTC ACCESS/ADA PARATRANSIT ELIGIBILITY (6/4/2019), Anemia, Arthritis, At risk for falls, Chronic back pain, Dental disorder, Pain (12/04/2018), Pain (02/04/2019), and Urinary incontinence.    Surgical History   has a past surgical history that includes  gastric bypass laparoscopic (2002); abdominal exploration; plastic surgery (2004); debridement (5/17/2012); appendectomy laparoscopic (3/21/2013); knee arthroplasty total (Right, 10/20/2015); mammoplasty augmentation (Bilateral, 2004); hip arthroplasty total (Right, 3/20/2018); cervical disk and fusion anterior (12/5/2018); corpectomy (12/5/2018); cervical fusion posterior (12/7/2018); cervical laminectomy posterior (12/7/2018); hip arthroplasty total (Left, 2/13/2019); other; and other.     Family History  family history includes Diabetes in her mother; Heart Disease in her mother.      Social History   reports that she has never smoked. She has never used smokeless tobacco. She reports previous alcohol use. She reports that she does not use drugs.    Allergies  Allergies   Allergen Reactions   • Tobacco [Nicot[place] Tabacum] Shortness of Breath     Cigarette smoke causes SOB, rispatory issues     Medications  Prior to Admission Medications   Prescriptions Last Dose Informant Patient Reported? Taking?   Albuterol Sulfate (PROAIR RESPICLICK) 108 (90 Base) MCG/ACT AEROSOL POWDER, BREATH ACTIVATED 4/3/2022 at 1545 Patient No No   Sig: Inhale 1 Puff every 6 hours as needed (Wheezing).   CALCIUM CARBONATE-VITAMIN D PO 4/3/2022 at 0800 Patient Yes No   Sig: Take 1 Tablet by mouth every day. Indications: Low Amount of Calcium in the Blood   Multiple Vitamins-Minerals (ONE-A-DAY WOMENS 50 PLUS PO) 4/3/2022 at 0800 Patient Yes No   Sig: Take 1 tablet by mouth every day.   Naproxen Sodium (ALEVE) 220 MG Cap 4/2/2022 at 2100 Patient Yes Yes   Sig: Take 660 mg by mouth 2 times a day as needed (For pain).   VITAMIN D PO 4/3/2022 at 0800 Patient Yes No   Sig: Take 1 Capsule by mouth every day.   ferrous sulfate 325 (65 Fe) MG tablet 4/3/2022 at 0800 Patient No No   Sig: Take 1 Tab by mouth every morning with breakfast.   fluticasone (FLOVENT HFA) 44 MCG/ACT Aerosol 4/3/2022 at 0800 Patient No No   Sig: Inhale 2 Puffs 2 times a  day. Everyday maintenance steroid inhaler   furosemide (LASIX) 40 MG Tab 4/1/2022 at AM Patient No No   Sig: Take 1 Tablet by mouth every day. Indications: Edema   gabapentin (NEURONTIN) 600 MG tablet 4/3/2022 at 0345 Patient No No   Sig: Take 1 tablet by mouth 4 times daily   Patient taking differently: Take 600 mg by mouth 4 times a day.   ipratropium-albuterol (DUONEB) 0.5-2.5 (3) MG/3ML nebulizer solution 4/2/2022 at 2100 Patient No No   Sig: Take 3 mL by nebulization every 6 hours as needed for Shortness of Breath.   methocarbamol (ROBAXIN) 500 MG Tab 4/3/2022 at 1545 Patient No No   Sig: Take 2 Tablets by mouth 4 times a day as needed.   Patient taking differently: Take 1,000 mg by mouth 4 times a day as needed. Indications: Musculoskeletal Pain   morphine ER (MS CONTIN) 15 MG Tab CR tablet 4/3/2022 at 1000 Patient Yes No   Sig: Take 15 mg by mouth every 12 hours.   oxyCODONE-acetaminophen (PERCOCET-10)  MG Tab 4/3/2022 at 1400 Patient Yes No   Sig: Take 1 Tablet by mouth 4 times a day.   phentermine 37.5 MG capsule 4/3/2022 at 1000 Patient No No   Sig: Take 1 Capsule by mouth every morning for 90 days.   phentermine 37.5 MG capsule DUPLICATE Patient No No   Sig: Take 1 Capsule by mouth every morning for 90 days.   potassium chloride SA (KDUR) 20 MEQ Tab CR 4/1/2022 at AM Patient No No   Sig: Take 1 Tablet by mouth every day.      Facility-Administered Medications: None     Physical Exam  Temp:  [36.9 °C (98.4 °F)-37 °C (98.6 °F)] 36.9 °C (98.4 °F)  Pulse:  [104-111] 111  Resp:  [16-20] 20  BP: ()/(62-80) 123/80  SpO2:  [90 %-93 %] 92 %  Blood Pressure: (!) 99/65   Temperature: 37 °C (98.6 °F)   Pulse: (!) 104   Respiration: 16   Pulse Oximetry: 90 %     Physical Exam  Constitutional:       General: She is not in acute distress.  HENT:      Head: Normocephalic and atraumatic.      Right Ear: External ear normal.      Left Ear: External ear normal.      Nose: No congestion or rhinorrhea.       Mouth/Throat:      Mouth: Mucous membranes are moist.      Pharynx: No oropharyngeal exudate or posterior oropharyngeal erythema.   Eyes:      General: No scleral icterus.        Right eye: No discharge.         Left eye: No discharge.      Conjunctiva/sclera: Conjunctivae normal.      Pupils: Pupils are equal, round, and reactive to light.   Cardiovascular:      Heart sounds:     No friction rub. No gallop.   Pulmonary:      Effort: Pulmonary effort is normal.   Abdominal:      General: Abdomen is flat. There is no distension.      Tenderness: There is no guarding.   Musculoskeletal:         General: Swelling, tenderness, deformity and signs of injury present.      Cervical back: Neck supple. No rigidity. No muscular tenderness.      Right lower leg: No edema.      Left lower leg: No edema.      Comments: Right mid thigh deep wound.  Edema erythema, and tenderness of the right lower extremity   Skin:     Capillary Refill: Capillary refill takes 2 to 3 seconds.      Coloration: Skin is not jaundiced or pale.      Findings: No bruising or erythema.   Neurological:      Mental Status: She is alert and oriented to person, place, and time.   Psychiatric:         Mood and Affect: Mood normal.         Judgment: Judgment normal.       Laboratory:  Recent Labs     04/03/22  1642   WBC 51.8*   RBC 4.32   HEMOGLOBIN 12.0   HEMATOCRIT 33.8*   MCV 78.2*   MCH 27.8   MCHC 35.5*   RDW 39.6   PLATELETCT 355   MPV 9.4     Recent Labs     04/03/22  1642   SODIUM 127*   POTASSIUM 4.9   CHLORIDE 93*   CO2 25   GLUCOSE 111*   BUN 21   CREATININE 0.49*   CALCIUM 8.2*     Recent Labs     04/03/22  1642   ALTSGPT 17   ASTSGOT 30   ALKPHOSPHAT 272*   TBILIRUBIN 1.9*   GLUCOSE 111*         No results for input(s): NTPROBNP in the last 72 hours.      No results for input(s): TROPONINT in the last 72 hours.    Imaging:  CT-EXTREMITY, LOWER WITH RIGHT   Final Result      1.  There is subcutaneous edema in the right lower leg with a large  anterior lower leg ulcer/wound defect.   2.  There is no subcutaneous fluid collection to suggest abscess.   3.  There is no CT evidence of osteomyelitis.      DX-TIBIA AND FIBULA RIGHT   Final Result      1.  Large ulcer/wound in the anterior mid right leg.   2.  No acute osseous abnormality.      US-EXTREMITY VENOUS LOWER UNILAT RIGHT   Final Result      1.  No evidence of Right  lower extremity deep venous thrombosis.      2.  Exam limited due to extensive subcutaneous edema.        Assessment/Plan:  I anticipate this patient will require at least two midnights for appropriate medical management, necessitating inpatient admission.    * Sepsis (HCC)- (present on admission)  Assessment & Plan  This is Sepsis Present on admission  SIRS criteria identified on my evaluation include: Tachycardia, with heart rate greater than 90 BPM and Leukocytosis, with WBC greater than 12,000  Source is cellulitis  Sepsis protocol initiated  Fluid resuscitation ordered per protocol  Crystalloid Fluid Administration: Fluid resuscitation ordered per standard protocol - 30 mL/kg per current or ideal body weight  IV antibiotics as appropriate for source of sepsis  Reassessment: I have reassessed the patient's hemodynamic status    Cellulitis- (present on admission)  Assessment & Plan  Started on Zosyn and vancomycin in the emergency room, continue for now follow cultures and sensitivities    Leukocytosis- (present on admission)  Assessment & Plan  Marked leukocytosis.  Likely secondary to cellulitis.  We will check CT scan of the right lower extremity to rule out underlying abscess.  Continue to monitor if persists despite antibiotic therapy will need further diagnostic testing to rule out blood malignancies    Edema- (present on admission)  Assessment & Plan  Extensive edema of the right lower extremity, will check CT scan to rule out underlying abscess  Consider restarting home diuretics when blood pressure is more stable    Essential  hypertension- (present on admission)  Assessment & Plan  Not currently on scheduled medications.  I will start as needed hydralazine labetalol for extreme hypertension    Hyponatremia- (present on admission)  Assessment & Plan  Hypovolemic hyponatremia  I expect Na to improve with IVF     VTE prophylaxis: SCDs/TEDs and heparin ppx

## 2022-04-04 NOTE — DIETARY
NUTRITION SERVICES: BMI - Pt with BMI >40 (=Body mass index is 44.55 kg/m².), Class III obesity. Weight loss counseling not appropriate in acute care setting. RECOMMEND - If appropriate at DC please refer to outpatient nutrition services for weight management.

## 2022-04-04 NOTE — PROGRESS NOTES
Hospital Medicine Daily Progress Note    Date of Service  4/4/2022    Chief Complaint  Karine Ovalle is a 56 y.o. female admitted 4/3/2022 with worsening wound    Hospital Course  Karine Ovalle is a 56 y.o. female with a past medical history of bilateral lower extremity lymphedema, mild intermittent asthma with chronic back pain who presented 4/3/2022 with worsening right lower extremity pain and swelling.  The patient states that she burned her anterior aspect of the right leg at the end of December.  Subsequently she has had an open wound followed by wound care.  She was seen by wound care on 24 March and I reviewed the note and the wound care nurse noted the patient was noncompliant did remove the dressing.  She did have an increase in interval size of her ulceration from prior.  She is not currently on antibiotics.  She states she has not had any associated fevers.  She states that her dog stepped on her leg and she had increased pain since that time.  She has had increased swelling as well.        Interval Problem Update  Patient is seen and examined at the bedside.  She is a drowsy, but is able to stay awake to answer questions.  AO x4.  She reported severe pain of RLE.  On Dilaudid as needed.      MRSA screen negative, discontinued vancomycin  Previous wound culture 2/2022 positive for Pseudomonas aeruginosa, MSSA, Morganella morganii, continue Zosyn  Bladder culture, wound culture pending    Leukocytosis with WBC 15, trending down to 47. Denies diarrhea, dysuria  Sodium 129  Leukocytosis resolved  CT RLE negative for abscess or osteomyelitis      I have personally seen and examined the patient at bedside. I discussed the plan of care with patient, bedside RN, charge RN and .    Consultants/Specialty      Code Status  Full Code    Disposition  Patient is not medically cleared for discharge.   Anticipate discharge to PT OT evaluation.  I have placed the appropriate orders for  post-discharge needs.    Review of Systems  Review of Systems   Constitutional: Positive for malaise/fatigue. Negative for chills and fever.   HENT: Negative for sore throat.    Eyes: Negative for pain.   Respiratory: Negative for shortness of breath and wheezing.    Cardiovascular: Positive for leg swelling. Negative for chest pain.   Gastrointestinal: Negative for abdominal pain, diarrhea, nausea and vomiting.   Genitourinary: Negative for dysuria, frequency and urgency.   Musculoskeletal: Positive for back pain and myalgias.   Neurological: Negative for headaches.        Physical Exam  Temp:  [36.8 °C (98.2 °F)-37.4 °C (99.3 °F)] 37.4 °C (99.3 °F)  Pulse:  [100-117] 115  Resp:  [16-20] 19  BP: ()/(62-80) 112/78  SpO2:  [90 %-99 %] 94 %    Physical Exam  Constitutional:       Appearance: She is obese. She is ill-appearing.   HENT:      Head: Normocephalic and atraumatic.      Nose: Nose normal.      Mouth/Throat:      Mouth: Mucous membranes are moist.   Eyes:      Extraocular Movements: Extraocular movements intact.      Conjunctiva/sclera: Conjunctivae normal.   Cardiovascular:      Rate and Rhythm: Normal rate and regular rhythm.      Pulses: Normal pulses.      Heart sounds: Normal heart sounds.   Pulmonary:      Effort: Pulmonary effort is normal.   Abdominal:      General: Bowel sounds are normal.      Palpations: Abdomen is soft.      Tenderness: There is no abdominal tenderness. There is no guarding.   Musculoskeletal:         General: Tenderness present.      Cervical back: Normal range of motion.      Right lower leg: Edema present.      Left lower leg: Edema present.      Comments: Right lower extremity erythema, swelling, tender, clean dressing in place   Skin:     General: Skin is warm.   Neurological:      Mental Status: She is alert and oriented to person, place, and time. Mental status is at baseline.         Fluids    Intake/Output Summary (Last 24 hours) at 4/4/2022 1541  Last data filed at  4/4/2022 0523  Gross per 24 hour   Intake 340 ml   Output 600 ml   Net -260 ml       Laboratory  Recent Labs     04/03/22  1642 04/04/22  0300   WBC 51.8* 47.8*   RBC 4.32 4.07*   HEMOGLOBIN 12.0 11.2*   HEMATOCRIT 33.8* 31.6*   MCV 78.2* 77.6*   MCH 27.8 27.5   MCHC 35.5* 35.4*   RDW 39.6 39.1   PLATELETCT 355 357   MPV 9.4 9.3     Recent Labs     04/03/22  1642 04/04/22  0300   SODIUM 127* 129*   POTASSIUM 4.9 4.1   CHLORIDE 93* 96   CO2 25 24   GLUCOSE 111* 88   BUN 21 13   CREATININE 0.49* 0.41*   CALCIUM 8.2* 7.8*                   Imaging  DX-CHEST-PORTABLE (1 VIEW)   Final Result         1.  No acute cardiopulmonary disease.      CT-EXTREMITY, LOWER WITH RIGHT   Final Result      1.  There is subcutaneous edema in the right lower leg with a large anterior lower leg ulcer/wound defect.   2.  There is no subcutaneous fluid collection to suggest abscess.   3.  There is no CT evidence of osteomyelitis.      DX-TIBIA AND FIBULA RIGHT   Final Result      1.  Large ulcer/wound in the anterior mid right leg.   2.  No acute osseous abnormality.      US-EXTREMITY VENOUS LOWER UNILAT RIGHT   Final Result      1.  No evidence of Right  lower extremity deep venous thrombosis.      2.  Exam limited due to extensive subcutaneous edema.           Assessment/Plan  * Sepsis (HCC)- (present on admission)  Assessment & Plan  This is Sepsis Present on admission  SIRS criteria identified on my evaluation include: Tachycardia, with heart rate greater than 90 BPM and Leukocytosis, with WBC greater than 12,000  Source is cellulitis  Sepsis protocol initiated  Fluid resuscitation ordered per protocol  Crystalloid Fluid Administration: Fluid resuscitation ordered per standard protocol - 30 mL/kg per current or ideal body weight  IV antibiotics as appropriate for source of sepsis  Reassessment: I have reassessed the patient's hemodynamic status    Open wound of right lower extremity  Assessment & Plan  she burned her anterior aspect of  the right leg at the end of December.  Subsequently she has had an open wound followed by wound care.   Noticed worsening wound per wound care  Previous wound culture 2/2022 positive for Pseudomonas aeruginosa, MSSA, Morganella morganii     Continue Zosyn  wound care consulted    Leukocytosis- (present on admission)  Assessment & Plan  Marked leukocytosis.  Likely secondary to cellulitis and wound infection  Bladder culture, wound culture pending    CT scan of the right lower extremity negative for abscess and osteomyelitis  Continue Zosyn    Cellulitis- (present on admission)  Assessment & Plan  Started on Zosyn and vancomycin in the emergency room  MRSA screen negative, discontinue Vanco  Previous wound culture 2/2022 positive for Pseudomonas aeruginosa, MSSA, Morganella morganii     Continue Zosyn        Edema- (present on admission)  Assessment & Plan  Extensive edema of the right lower extremity, will check CT scan to rule out underlying abscess  Consider restarting home diuretics when blood pressure is more stable    Morbid obesity with BMI of 40.0-44.9, adult (HCC)- (present on admission)  Assessment & Plan  BMI 44    Essential hypertension- (present on admission)  Assessment & Plan  Not currently on scheduled medications.  I will start as needed hydralazine labetalol for extreme hypertension    Hyponatremia- (present on admission)  Assessment & Plan  Hypovolemic hyponatremia  I expect Na to improve with IVF        VTE prophylaxis: SCDs/TEDs and heparin ppx    I have performed a physical exam and reviewed and updated ROS and Plan today (4/4/2022). In review of yesterday's note (4/3/2022), there are no changes except as documented above.

## 2022-04-04 NOTE — CARE PLAN
The patient is Stable - Low risk of patient condition declining or worsening         Progress made toward(s) clinical / shift goals:    Problem: Knowledge Deficit - Standard  Goal: Patient and family/care givers will demonstrate understanding of plan of care, disease process/condition, diagnostic tests and medications  Outcome: Progressing     Problem: Pain - Standard  Goal: Alleviation of pain or a reduction in pain to the patient’s comfort goal  Outcome: Progressing     Problem: Hemodynamics  Goal: Patient's hemodynamics, fluid balance and neurologic status will be stable or improve  Outcome: Progressing     Problem: Fluid Volume  Goal: Fluid volume balance will be maintained  Outcome: Progressing     Problem: Urinary - Renal Perfusion  Goal: Ability to achieve and maintain adequate renal perfusion and functioning will improve  Outcome: Progressing     Problem: Respiratory  Goal: Patient will achieve/maintain optimum respiratory ventilation and gas exchange  Outcome: Progressing     Problem: Mechanical Ventilation  Goal: Safe management of artificial airway and ventilation  Outcome: Progressing  Goal: Successful weaning off mechanical ventilator, spontaneously maintains adequate gas exchange  Outcome: Progressing  Goal: Patient will be able to express needs and understand communication  Outcome: Progressing     Problem: Fall Risk  Goal: Patient will remain free from falls  Outcome: Progressing     Problem: Physical Regulation  Goal: Diagnostic test results will improve  Outcome: Progressing  Goal: Signs and symptoms of infection will decrease  Outcome: Progressing       Patient is not progressing towards the following goals:      Problem: Skin Integrity  Goal: Skin integrity is maintained or improved  Outcome: Not Progressing

## 2022-04-04 NOTE — ED NOTES
Called and informed [facility] of delay in  Transporting pt to floor  Waiting on XR for central line placement verification and will start fluids and IV med

## 2022-04-04 NOTE — ASSESSMENT & PLAN NOTE
Started on Zosyn and vancomycin in the emergency room  MRSA screen negative, discontinue Vanco  Wound culture positive for E Coli, M Morganii, Strep Pyogenes  Continue Zosyn  Repeat CT leg

## 2022-04-04 NOTE — ED NOTES
Vancomycin started and infusing as ordered.    MRSA swab collected and  at bedside for the repeat lactic acid to be drawn.

## 2022-04-04 NOTE — ED NOTES
Admission MD at  for evaluation and order placement for admission   2nd bld cultures drawn by lab

## 2022-04-05 ENCOUNTER — APPOINTMENT (OUTPATIENT)
Dept: RADIOLOGY | Facility: MEDICAL CENTER | Age: 57
DRG: 871 | End: 2022-04-05
Attending: INTERNAL MEDICINE
Payer: COMMERCIAL

## 2022-04-05 PROBLEM — R60.0 LEG EDEMA: Status: ACTIVE | Noted: 2021-08-09

## 2022-04-05 LAB
ALBUMIN SERPL BCP-MCNC: 1.8 G/DL (ref 3.2–4.9)
ALBUMIN/GLOB SERPL: 0.4 G/DL
ALP SERPL-CCNC: 284 U/L (ref 30–99)
ALT SERPL-CCNC: 13 U/L (ref 2–50)
ANION GAP SERPL CALC-SCNC: 9 MMOL/L (ref 7–16)
AST SERPL-CCNC: 20 U/L (ref 12–45)
BASOPHILS # BLD AUTO: 0 % (ref 0–1.8)
BASOPHILS # BLD: 0 K/UL (ref 0–0.12)
BILIRUB SERPL-MCNC: 1.5 MG/DL (ref 0.1–1.5)
BUN SERPL-MCNC: 9 MG/DL (ref 8–22)
CALCIUM SERPL-MCNC: 7.8 MG/DL (ref 8.4–10.2)
CHLORIDE SERPL-SCNC: 97 MMOL/L (ref 96–112)
CO2 SERPL-SCNC: 24 MMOL/L (ref 20–33)
CREAT SERPL-MCNC: 0.37 MG/DL (ref 0.5–1.4)
EOSINOPHIL # BLD AUTO: 0.48 K/UL (ref 0–0.51)
EOSINOPHIL NFR BLD: 1 % (ref 0–6.9)
ERYTHROCYTE [DISTWIDTH] IN BLOOD BY AUTOMATED COUNT: 39.4 FL (ref 35.9–50)
GFR SERPLBLD CREATININE-BSD FMLA CKD-EPI: 118 ML/MIN/1.73 M 2
GLOBULIN SER CALC-MCNC: 4.5 G/DL (ref 1.9–3.5)
GLUCOSE SERPL-MCNC: 78 MG/DL (ref 65–99)
HCT VFR BLD AUTO: 29.2 % (ref 37–47)
HGB BLD-MCNC: 10.4 G/DL (ref 12–16)
HYPOCHROMIA BLD QL SMEAR: ABNORMAL
LYMPHOCYTES # BLD AUTO: 2.85 K/UL (ref 1–4.8)
LYMPHOCYTES NFR BLD: 6 % (ref 22–41)
MANUAL DIFF BLD: NORMAL
MCH RBC QN AUTO: 27.9 PG (ref 27–33)
MCHC RBC AUTO-ENTMCNC: 35.6 G/DL (ref 33.6–35)
MCV RBC AUTO: 78.3 FL (ref 81.4–97.8)
METAMYELOCYTES NFR BLD MANUAL: 2 %
MONOCYTES # BLD AUTO: 1.9 K/UL (ref 0–0.85)
MONOCYTES NFR BLD AUTO: 4 % (ref 0–13.4)
MYELOCYTES NFR BLD MANUAL: 1 %
NEUTROPHILS # BLD AUTO: 40.85 K/UL (ref 2–7.15)
NEUTROPHILS NFR BLD: 79 % (ref 44–72)
NEUTS BAND NFR BLD MANUAL: 7 % (ref 0–10)
NRBC # BLD AUTO: 0 K/UL
NRBC BLD-RTO: 0 /100 WBC
PLATELET # BLD AUTO: 352 K/UL (ref 164–446)
PLATELET BLD QL SMEAR: NORMAL
PMV BLD AUTO: 9.5 FL (ref 9–12.9)
POLYCHROMASIA BLD QL SMEAR: NORMAL
POTASSIUM SERPL-SCNC: 4.2 MMOL/L (ref 3.6–5.5)
PROT SERPL-MCNC: 6.3 G/DL (ref 6–8.2)
RBC # BLD AUTO: 3.73 M/UL (ref 4.2–5.4)
RBC BLD AUTO: PRESENT
SODIUM SERPL-SCNC: 130 MMOL/L (ref 135–145)
TARGETS BLD QL SMEAR: NORMAL
TOXIC GRANULES BLD QL SMEAR: NORMAL
VARIANT LYMPHS BLD QL SMEAR: NORMAL
WBC # BLD AUTO: 47.5 K/UL (ref 4.8–10.8)

## 2022-04-05 PROCEDURE — 94760 N-INVAS EAR/PLS OXIMETRY 1: CPT

## 2022-04-05 PROCEDURE — A9270 NON-COVERED ITEM OR SERVICE: HCPCS | Performed by: STUDENT IN AN ORGANIZED HEALTH CARE EDUCATION/TRAINING PROGRAM

## 2022-04-05 PROCEDURE — 85007 BL SMEAR W/DIFF WBC COUNT: CPT

## 2022-04-05 PROCEDURE — 80053 COMPREHEN METABOLIC PANEL: CPT

## 2022-04-05 PROCEDURE — 770006 HCHG ROOM/CARE - MED/SURG/GYN SEMI*

## 2022-04-05 PROCEDURE — 93922 UPR/L XTREMITY ART 2 LEVELS: CPT | Mod: 52,RT

## 2022-04-05 PROCEDURE — 97163 PT EVAL HIGH COMPLEX 45 MIN: CPT

## 2022-04-05 PROCEDURE — 94640 AIRWAY INHALATION TREATMENT: CPT

## 2022-04-05 PROCEDURE — 700101 HCHG RX REV CODE 250: Performed by: STUDENT IN AN ORGANIZED HEALTH CARE EDUCATION/TRAINING PROGRAM

## 2022-04-05 PROCEDURE — 85025 COMPLETE CBC W/AUTO DIFF WBC: CPT

## 2022-04-05 PROCEDURE — 99233 SBSQ HOSP IP/OBS HIGH 50: CPT | Performed by: INTERNAL MEDICINE

## 2022-04-05 PROCEDURE — 97166 OT EVAL MOD COMPLEX 45 MIN: CPT

## 2022-04-05 PROCEDURE — 700111 HCHG RX REV CODE 636 W/ 250 OVERRIDE (IP): Performed by: HOSPITALIST

## 2022-04-05 PROCEDURE — 93922 UPR/L XTREMITY ART 2 LEVELS: CPT | Mod: 26,52 | Performed by: INTERNAL MEDICINE

## 2022-04-05 PROCEDURE — A9270 NON-COVERED ITEM OR SERVICE: HCPCS | Performed by: HOSPITALIST

## 2022-04-05 PROCEDURE — 700111 HCHG RX REV CODE 636 W/ 250 OVERRIDE (IP): Performed by: INTERNAL MEDICINE

## 2022-04-05 PROCEDURE — 700105 HCHG RX REV CODE 258: Performed by: HOSPITALIST

## 2022-04-05 PROCEDURE — 700102 HCHG RX REV CODE 250 W/ 637 OVERRIDE(OP): Performed by: HOSPITALIST

## 2022-04-05 PROCEDURE — 700102 HCHG RX REV CODE 250 W/ 637 OVERRIDE(OP): Performed by: STUDENT IN AN ORGANIZED HEALTH CARE EDUCATION/TRAINING PROGRAM

## 2022-04-05 RX ORDER — FUROSEMIDE 10 MG/ML
40 INJECTION INTRAMUSCULAR; INTRAVENOUS
Status: DISCONTINUED | OUTPATIENT
Start: 2022-04-05 | End: 2022-04-07

## 2022-04-05 RX ADMIN — FUROSEMIDE 40 MG: 10 INJECTION, SOLUTION INTRAMUSCULAR; INTRAVENOUS at 14:35

## 2022-04-05 RX ADMIN — SODIUM CHLORIDE: 9 INJECTION, SOLUTION INTRAVENOUS at 21:45

## 2022-04-05 RX ADMIN — HEPARIN SODIUM 5000 UNITS: 5000 INJECTION INTRAVENOUS; SUBCUTANEOUS at 21:48

## 2022-04-05 RX ADMIN — MORPHINE SULFATE 15 MG: 15 TABLET, FILM COATED, EXTENDED RELEASE ORAL at 17:05

## 2022-04-05 RX ADMIN — SODIUM HYPOCHLORITE 1 ML: 1.25 SOLUTION TOPICAL at 05:23

## 2022-04-05 RX ADMIN — Medication 1 TABLET: at 05:22

## 2022-04-05 RX ADMIN — OXYCODONE HYDROCHLORIDE 5 MG: 5 TABLET ORAL at 18:16

## 2022-04-05 RX ADMIN — SODIUM HYPOCHLORITE 1 ML: 1.25 SOLUTION TOPICAL at 16:56

## 2022-04-05 RX ADMIN — PIPERACILLIN AND TAZOBACTAM 4.5 G: 4; .5 INJECTION, POWDER, LYOPHILIZED, FOR SOLUTION INTRAVENOUS; PARENTERAL at 21:45

## 2022-04-05 RX ADMIN — ACETAMINOPHEN 650 MG: 325 TABLET, FILM COATED ORAL at 11:33

## 2022-04-05 RX ADMIN — HEPARIN SODIUM 5000 UNITS: 5000 INJECTION INTRAVENOUS; SUBCUTANEOUS at 05:23

## 2022-04-05 RX ADMIN — PIPERACILLIN AND TAZOBACTAM 4.5 G: 4; .5 INJECTION, POWDER, LYOPHILIZED, FOR SOLUTION INTRAVENOUS; PARENTERAL at 05:20

## 2022-04-05 RX ADMIN — FLUTICASONE PROPIONATE 88 MCG: 44 AEROSOL, METERED RESPIRATORY (INHALATION) at 20:24

## 2022-04-05 RX ADMIN — METHOCARBAMOL 500 MG: 500 TABLET ORAL at 17:05

## 2022-04-05 RX ADMIN — HEPARIN SODIUM 5000 UNITS: 5000 INJECTION INTRAVENOUS; SUBCUTANEOUS at 12:54

## 2022-04-05 RX ADMIN — SODIUM CHLORIDE: 9 INJECTION, SOLUTION INTRAVENOUS at 13:49

## 2022-04-05 RX ADMIN — PIPERACILLIN AND TAZOBACTAM 4.5 G: 4; .5 INJECTION, POWDER, LYOPHILIZED, FOR SOLUTION INTRAVENOUS; PARENTERAL at 12:19

## 2022-04-05 RX ADMIN — MORPHINE SULFATE 15 MG: 15 TABLET, FILM COATED, EXTENDED RELEASE ORAL at 05:22

## 2022-04-05 RX ADMIN — FLUTICASONE PROPIONATE 88 MCG: 44 AEROSOL, METERED RESPIRATORY (INHALATION) at 07:58

## 2022-04-05 RX ADMIN — METHOCARBAMOL 500 MG: 500 TABLET ORAL at 05:22

## 2022-04-05 RX ADMIN — GABAPENTIN 600 MG: 300 CAPSULE ORAL at 05:22

## 2022-04-05 RX ADMIN — SENNOSIDES AND DOCUSATE SODIUM 2 TABLET: 50; 8.6 TABLET ORAL at 05:23

## 2022-04-05 RX ADMIN — GABAPENTIN 600 MG: 300 CAPSULE ORAL at 17:04

## 2022-04-05 RX ADMIN — OXYCODONE HYDROCHLORIDE 5 MG: 5 TABLET ORAL at 11:32

## 2022-04-05 RX ADMIN — GABAPENTIN 600 MG: 300 CAPSULE ORAL at 11:25

## 2022-04-05 ASSESSMENT — COGNITIVE AND FUNCTIONAL STATUS - GENERAL
MOVING TO AND FROM BED TO CHAIR: UNABLE
DAILY ACTIVITIY SCORE: 13
PERSONAL GROOMING: A LITTLE
SUGGESTED CMS G CODE MODIFIER MOBILITY: CN
DRESSING REGULAR UPPER BODY CLOTHING: A LITTLE
CLIMB 3 TO 5 STEPS WITH RAILING: TOTAL
MOVING FROM LYING ON BACK TO SITTING ON SIDE OF FLAT BED: UNABLE
WALKING IN HOSPITAL ROOM: TOTAL
TOILETING: TOTAL
MOBILITY SCORE: 6
STANDING UP FROM CHAIR USING ARMS: TOTAL
HELP NEEDED FOR BATHING: TOTAL
SUGGESTED CMS G CODE MODIFIER DAILY ACTIVITY: CL
TURNING FROM BACK TO SIDE WHILE IN FLAT BAD: UNABLE
DRESSING REGULAR LOWER BODY CLOTHING: TOTAL

## 2022-04-05 ASSESSMENT — ENCOUNTER SYMPTOMS
ABDOMINAL PAIN: 0
HEADACHES: 0
SHORTNESS OF BREATH: 0
EYE PAIN: 0
DIARRHEA: 0
VOMITING: 0
FEVER: 0
SORE THROAT: 0
MYALGIAS: 1
NAUSEA: 0
WHEEZING: 0
BACK PAIN: 1
CHILLS: 0

## 2022-04-05 ASSESSMENT — GAIT ASSESSMENTS: GAIT LEVEL OF ASSIST: UNABLE TO PARTICIPATE

## 2022-04-05 ASSESSMENT — PAIN DESCRIPTION - PAIN TYPE
TYPE: ACUTE PAIN

## 2022-04-05 ASSESSMENT — ACTIVITIES OF DAILY LIVING (ADL): TOILETING: INDEPENDENT

## 2022-04-05 NOTE — CARE PLAN
The patient is Stable - Low risk of patient condition declining or worsening    Shift Goals  Clinical Goals: patient will have minimal pain only  Patient Goals: rest comfortbaly    Progress made toward(s) clinical / shift goals:  Patient still complains of pain to neck and RLE, pain medication given as ordered. Per patient, she has difficulty of swallowing, SLP eval ordered. To follow up....    Patient is not progressing towards the following goals:    Problem: Pain - Standard  Goal: Alleviation of pain or a reduction in pain to the patient’s comfort goal  Outcome: Progressing     Problem: Knowledge Deficit - Standard  Goal: Patient and family/care givers will demonstrate understanding of plan of care, disease process/condition, diagnostic tests and medications  Outcome: Progressing     Problem: Respiratory  Goal: Patient will achieve/maintain optimum respiratory ventilation and gas exchange  Outcome: Progressing     Problem: Urinary - Renal Perfusion  Goal: Ability to achieve and maintain adequate renal perfusion and functioning will improve  Outcome: Progressing       Problem: Physical Regulation  Goal: Diagnostic test results will improve  Outcome: Progressing     Problem: Physical Regulation  Goal: Signs and symptoms of infection will decrease  Outcome: Progressing     Problem: Fall Risk  Goal: Patient will remain free from falls  Outcome: Progressing     Problem: Skin Integrity  Goal: Skin integrity is maintained or improved  Outcome: Progressing

## 2022-04-05 NOTE — PROGRESS NOTES
Lab called( Cierra Grayson) , infroming wound c/s result Group A Strep.Read back to Cierra Grayson. MD made aware. Awaiting in the system to be entered.

## 2022-04-05 NOTE — PROGRESS NOTES
Received patient from Night shift RN . Patient is awake and alert. Comfortably eating her breakfast. Complains of pain to right neck where CVP was inserted, rest promoted. Swollen legs and arms still present. Dressing to RLE in placed, dry and intact.  Fall precaution observed, kept bed in lowest position and call light within reach.

## 2022-04-05 NOTE — CARE PLAN
The patient is Stable - Low risk of patient condition declining or worsening    Shift Goals  Clinical Goals: pt will remain at stated comfort goal of 3/10 pain.  Patient Goals: sleep comfortably    Progress made toward(s) clinical / shift goals:        Problem: Pain - Standard  Goal: Alleviation of pain or a reduction in pain to the patient’s comfort goal  Outcome: Progressing     Problem: Knowledge Deficit - Standard  Goal: Patient and family/care givers will demonstrate understanding of plan of care, disease process/condition, diagnostic tests and medications  Outcome: Progressing     Problem: Hemodynamics  Goal: Patient's hemodynamics, fluid balance and neurologic status will be stable or improve  Outcome: Progressing     Problem: Physical Regulation  Goal: Diagnostic test results will improve  Outcome: Progressing  Goal: Signs and symptoms of infection will decrease  Outcome: Progressing     Problem: Fall Risk  Goal: Patient will remain free from falls  Outcome: Progressing     Problem: Skin Integrity  Goal: Skin integrity is maintained or improved  Outcome: Progressing       Patient is not progressing towards the following goals:

## 2022-04-05 NOTE — PROGRESS NOTES
Pt is awake in bed. VSS. Pt c/o 7/10 pain in RLE. No n/v. Medicated per MAR. Wound care and RN changed dressing on R shin. No other needs at this time. Fall precautions in place.

## 2022-04-05 NOTE — WOUND TEAM
"[facility]  Inpatient Services  Initial Wound and Skin Care Evaluation    Admission Date: 4/3/2022     Last order of IP CONSULT TO WOUND CARE was found on 4/3/2022 from Hospital Encounter on 4/3/2022     HPI, PMH, SH: Reviewed    Past Surgical History:   Procedure Laterality Date   • HIP ARTHROPLASTY TOTAL Left 2/13/2019    Procedure: HIP ARTHROPLASTY TOTAL, Cabling of intra-operative calcar fracture;  Surgeon: Bryon Levine M.D.;  Location: [facility];  Service: Orthopedics   • CERVICAL FUSION POSTERIOR  12/7/2018    Procedure: CERVICAL FUSION POSTERIOR- STAGE #2 C3-5 AND C3-T1;  Surgeon: Emre Mendoza III, M.D.;  Location: [facility];  Service: Neurosurgery   • CERVICAL LAMINECTOMY POSTERIOR  12/7/2018    Procedure: CERVICAL LAMINECTOMY POSTERIOR C2-T1;  Surgeon: Emre Mendoza III, M.D.;  Location: [facility];  Service: Neurosurgery   • CERVICAL DISK AND FUSION ANTERIOR  12/5/2018    Procedure: CERVICAL DISK AND FUSION ANTERIOR-STAGE #1 C4-5;  Surgeon: Emre Mendoza III, M.D.;  Location: [facility];  Service: Neurosurgery   • CORPECTOMY  12/5/2018    Procedure: CORPECTOMY;  Surgeon: Emre Mendoza III, M.D.;  Location: [facility];  Service: Neurosurgery   • HIP ARTHROPLASTY TOTAL Right 3/20/2018    Procedure: HIP ARTHROPLASTY TOTAL;  Surgeon: Bryon Levine M.D.;  Location: [facility];  Service: Orthopedics   • KNEE ARTHROPLASTY TOTAL Right 10/20/2015    Procedure: KNEE ARTHROPLASTY TOTAL;  Surgeon: Bryon Levine M.D.;  Location: [facility];  Service:    • APPENDECTOMY LAPAROSCOPIC  3/21/2013    Performed by Dali Queen M.D. at [facility]   • DEBRIDEMENT  5/17/2012    Performed by BRADLEY DYKES at [facility]   • PLASTIC SURGERY  2004    excess skin on arms and legs removed   • MAMMOPLASTY AUGMENTATION Bilateral 2004    breast implants and \"tummy tuck\"   • GASTRIC " "BYPASS LAPAROSCOPIC  2002    x 2   • ABDOMINAL EXPLORATION     • OTHER      breast augmentation 2004   • OTHER      Gastric bypass 2002     Social History     Tobacco Use   • Smoking status: Never Smoker   • Smokeless tobacco: Never Used   Substance Use Topics   • Alcohol use: Not Currently     Comment: 3-4 per week; pt stops alcohol use 1-week ago     Chief Complaint   Patient presents with   • Leg Pain     R LOWER LEG  HAS WOUND ON IT W/ WOUND VAC IN PLACE  DOG STEPPED ON SAME LEG  NOW HAVING PAIN     Diagnosis: Cellulitis [L03.90]    Unit where seen by Wound Team: 2211/01     WOUND CONSULT/FOLLOW UP RELATED TO:  RLE     WOUND HISTORY:  Per H&P: \"Karine Ovalle is a 56 y.o. female with a past medical history of bilateral lower extremity lymphedema, mild intermittent asthma with chronic pain who presented 4/3/2022 with worsening right lower extremity pain and swelling.  Patient reports that the pain is severe rated as 10 out of 10.  No radiation to other places.  Pain is worse with attempting to move her extremity.  She denies having fevers but reports having intermittent chills.  Denies cough and shortness of breath cough.\"    WOUND ASSESSMENT/LDA  Wound 01/11/22 Right Anterior LE (Active)         Site Assessment Yellow;Tan;Brown;Red    Periwound Assessment Edema    Margins Defined edges    Closure Secondary intention    Drainage Amount Scant    Drainage Description Serous    Treatments Cleansed    Wound Cleansing Normal Saline Irrigation    Periwound Protectant Barrier Paste    Dressing Cleansing/Solutions 1/4 Strength Dakin's Solution    Dressing Options Moist Roll Gauze;Absorbent Abdominal Pad;Dry Roll Gauze    Dressing Changed Changed    Dressing Status Intact    Dressing Change/Treatment Frequency Every Shift, and As Needed    NEXT Dressing Change/Treatment Date 04/05/22    NEXT Weekly Photo (Inpatient Only) 04/11/22    Non-staged Wound Description Full thickness 04/04/22 1900   Wound Length (cm) 8.5 cm " lateral 2.7 04/04/22 1900   Wound Width (cm) 5.8 cm lateral 2.5 04/04/22 1900   Wound Surface Area (cm^2) 49.3 cm^2 04/04/22 1900   Shape irregular    Wound Odor Mild    Pulses Thready    Exposed Structures BRIGIDA    Number of days: 83          04/04/22 1900          Wound 01/11/22 Right Anteromedial LE (Active)   Site Assessment Yellow;Light Purple    Periwound Assessment Edema    Margins Defined edges    Closure Secondary intention    Drainage Amount None    Treatments Cleansed    Wound Cleansing Normal Saline Irrigation    Periwound Protectant Barrier Paste    Dressing Cleansing/Solutions 1/4 Strength Dakin's Solution    Dressing Options Moist Roll Gauze;Absorbent Abdominal Pad;Dry Roll Gauze    Dressing Changed Changed    Dressing Status Intact    Dressing Change/Treatment Frequency Every Shift, and As Needed    NEXT Dressing Change/Treatment Date 04/05/22    NEXT Weekly Photo (Inpatient Only) 04/11/22    Non-staged Wound Description Full thickness 04/04/22 1900   Wound Length (cm) 1.5 cm 04/04/22 1900   Wound Width (cm) 1.5 cm 04/04/22 1900   Wound Surface Area (cm^2) 2.25 cm^2 04/04/22 1900   Shape circular    Wound Odor None    Pulses Thready;DP    Exposed Structures BRIGIDA    Number of days: 83        Vascular:    EVELYNE:   No results found.    Lab Values:    Lab Results   Component Value Date/Time    WBC 47.8 (HH) 04/04/2022 03:00 AM    WBC 8.2 08/14/2010 12:00 AM    RBC 4.07 (L) 04/04/2022 03:00 AM    RBC 4.75 08/14/2010 12:00 AM    HEMOGLOBIN 11.2 (L) 04/04/2022 03:00 AM    HEMATOCRIT 31.6 (L) 04/04/2022 03:00 AM    CREACTPROT 52.09 (H) 04/03/2022 04:42 PM    SEDRATEWES 32 (H) 12/31/2021 12:50 AM    HBA1C 5.1 07/26/2021 11:14 AM        Culture Results show:  No results found for this or any previous visit (from the past 720 hour(s)).    Pain Level/Medicated:  Premedicated by primary RN to good effect       INTERVENTIONS BY WOUND TEAM:  Chart and images reviewed. Discussed with bedside RN. All areas of concern  (based on picture review, LDA review and discussion with bedside RN) have been thoroughly assessed. Documentation of areas based on significant findings. This RN in to assess patient. Performed standard wound care which includes appropriate positioning, dressing removal and non-selective debridement. Pictures and measurements obtained weekly if/when required.  Preparation for Dressing removal: Dressing soaked with NS  Non-selectively Debrided with:  NS and gauze.  Sharp debridement: NA  Milagro wound: Cleansed with NS, Prepped with barrier paste  Primary Dressin/4 strength Dakins moistened roll gauze  Secondary (Outer) Dressing: abd pad secured with roll gauze    Interdisciplinary consultation: Patient, Bedside RN     EVALUATION / RATIONALE FOR TREATMENT:  Most Recent Date:  : Pt's wounds have odor and appear worse than picture last taken at outpt wound clinic 3/24. There is yellow and brown/tan tissue to wound bed,  red edges. Dakin's Solution® Quarter Strength is a broad-spectrum antimicrobial cleanser that is gentle to the skin. Effective against MRSA, VRE, other bacteria, viruses, molds, fungi and yeast. Also used for odor control. It helps debride chemically and mechanically with drsg removal.    There is a wound to her medial posterior ankle with scant bleeding. Unable to fully assess r/t  inability to raise leg to visualize wound. Applied heel mepilex and RN to apply heel float boot.     Goals: Steady decrease in non-viable tissue in wound bed weekly.    WOUND TEAM PLAN OF CARE ([X] for frequency of wound follow up,):   Nursing to follow orders written for wound care. Contact wound team if area fails to progress, deteriorates or with any questions/concerns  Dressing changes by wound team:                   Follow up 3 times weekly:                NPWT change 3 times weekly:     Follow up 1-2 times weekly:  x    Follow up Bi-Monthly:                   Follow up as needed:     Other (explain):     NURSING  PLAN OF CARE ORDERS (X):  Dressing changes: See Dressing Care orders: x  Skin care: See Skin Care orders:   RN Prevention Protocol: x  Rectal tube care: See Rectal Tube Care orders:   Other orders:    RSKIN:   CURRENTLY IN PLACE (X), APPLIED THIS VISIT (A), ORDERED (O):   Q shift Korey:  X  Q shift pressure point assessments:  X    Surface/Positioning   Pressure redistribution mattress   x         Low Airloss          Bariatric foam      Bariatric KRISTOPHER     Waffle cushion        Waffle Overlay          Reposition q 2 hours   x   TAPs Turning system     Z Chandra Pillow     Offloading/Redistribution  Sacral Mepilex (Silicone dressing)     Heel Mepilex (Silicone dressing)         Heel float boots (Prevalon boot)  o           Float Heels off Bed with Pillows           Respiratory RA  Silicone O2 tubing         Gray Foam Ear protectors     Cannula fixation Device (Tender )          High flow offloading Clip    Elastic head band offloading device      Anchorfast                                                         Trach with Optifoam split foam             Containment/Moisture Prevention     Rectal tube or BMS    Purwick/Condom Cath        Su Catheter    Barrier wipes           Barrier paste       Antifungal tx      Interdry        Mobilization not assessed      Up to chair        Ambulate      PT/OT      Nutrition       Dietician        Diabetes Education      PO  x   TF     TPN     NPO   # days     Other        Anticipated discharge plans:   LTACH:        SNF/Rehab:                  Home Health Care:           Outpatient Wound Center: referral placed           Self/Family Care:        Other:           Vac Discharge Needs:  Already has home machine in room   Not Applicable Pt not on a wound vac:   Not until wound bed more viable    Regular Vac while inpatient, alternative dressing at DC:        Regular Vac in use and continued at DC:         Reg. Vac w/ Skin Sub/Biologic in use. Will need to be changed 2x wkly:       Veraflo Vac while inpatient, ok to transition to Regular Vac on Discharge:           Veraflo Vac while inpatient, will need to remain on Veraflo Vac upon discharge:

## 2022-04-05 NOTE — THERAPY
Physical Therapy   Initial Evaluation     Patient Name: Karine Ovalle  Age:  56 y.o., Sex:  female  Medical Record #: [id number]  Today's Date: 4/5/2022     Precautions  Precautions: (P) Fall Risk  Comments: (P) B LE edema    Assessment  Patient is 56 y.o. female who was recently admitted for R LE wound and B LE edema. Pt is also present for medical management of sepsis. Pt presented to PT with impaired balance, impaired gait, weakness, pain, and dec activity tolerance. Pt was primarily limited by pain and required Max A x 2 for bed mobility and partial standing. Pt's current impairments are limiting her ability to safely perform functional mobility at her PLOF. Pt was assisted back to bed and provided with new linen. Pt was able to participate in rolling in bed with cues for hand placement to assist with rolling. Pt was positioned in supine with assistance in positioning of R LE for elevation to assist with dec edema. Recommend post-acute placement for continued physical therapy services prior to discharge home.       Plan    Recommend Physical Therapy 3 times per week until therapy goals are met for the following treatments:  Bed Mobility, Community Re-integration, Equipment, Gait Training, Manual Therapy, Neuro Re-Education / Balance, Self Care/Home Evaluation, Therapeutic Activities, and Therapeutic Exercises    DC Equipment Recommendations: (P) Unable to determine at this time  Discharge Recommendations: (P) Recommend post-acute placement for additional physical therapy services prior to discharge home     Objective       04/05/22 1350   Initial Contact Note    Initial Contact Note Order Received and Verified, Physical Therapy Evaluation in Progress with Full Report to Follow.   Precautions   Precautions Fall Risk   Comments B LE edema   Pain 0 - 10 Group   Location Leg   Location Orientation Right   Description Aching   Therapist Pain Assessment Prior to Activity;During Activity;Post Activity;Nurse  Notified  (c/o moderate pain of R LE, specifically during bed mobility)   Prior Living Situation   Housing / Facility 1 Story Apartment / Condo   Steps Into Home 0   Steps In Home 0   Equipment Owned Single Point Cane   Lives with - Patient's Self Care Capacity Friends   Comments pt states she lives with a friend who can assist with transportation and intermittent assist with ADL's.   Prior Level of Functional Mobility   Bed Mobility Independent   Transfer Status Independent   Ambulation Independent   Distance Ambulation (Feet)   (limited community)   Assistive Devices Used Single Point Cane   Stairs Independent   Comments pt reports of an IPLOF; primiarly uses SPC   Cognition    Cognition / Consciousness X   Level of Consciousness Alert   Attention Impaired   Comments tangential throughout session; hyperverbose and difficulty maintaining attention during tasks   Passive ROM Lower Body   Passive ROM Lower Body X   Comments limited in B LE due to edema   Active ROM Lower Body    Active ROM Lower Body  X   Comments limited due to pain and edema in B LE; unable to demo functinal AROM for bed mobility; difficult to flex B knees   Strength Lower Body   Lower Body Strength  X   Gross Strength Generalized Weakness, Equal Bilaterally   Comments presents with gross strength of 2/5 in B LE ; non functional for standing at this time; weaker on R LE due to pain   Sensation Lower Body   Lower Extremity Sensation   WDL   Lower Body Muscle Tone   Lower Body Muscle Tone  WDL   Strength Upper Body   Upper Body Strength  WDL   Upper Body Muscle Tone   Upper Body Muscle Tone  WDL   Neurological Concerns   Neurological Concerns No   Coordination Upper Body   Coordination WDL   Coordination Lower Body    Coordination Lower Body  Not Tested   Balance Assessment   Sitting Balance (Static) Fair   Sitting Balance (Dynamic) Fair -   Standing Balance (Static) Dependent   Standing Balance (Dynamic) Dependent   Weight Shift Sitting Poor    Weight Shift Standing Absent   Comments able to sit self supported at EOB; requires Max A for standing balance, only able to demonstrate partial standing   Gait Analysis   Gait Level Of Assist Unable to Participate   Weight Bearing Status fwb   Bed Mobility    Supine to Sit Maximal Assist  (x2)   Sit to Supine Maximal Assist  (x2)   Scooting Maximal Assist  (x2)   Rolling Maximal Assist to Rt.;Maximum Assist to Lt.   Comments HOB elevated and rails up   Functional Mobility   Sit to Stand Maximal Assist  (x2)   Mobility EOB, sit<>stand   Comments cues for flexing B LE prior to standing   How much difficulty does the patient currently have...   Turning over in bed (including adjusting bedclothes, sheets and blankets)? 1   Sitting down on and standing up from a chair with arms (e.g., wheelchair, bedside commode, etc.) 1   Moving from lying on back to sitting on the side of the bed? 1   How much help from another person does the patient currently need...   Moving to and from a bed to a chair (including a wheelchair)? 1   Need to walk in a hospital room? 1   Climbing 3-5 steps with a railing? 1   6 clicks Mobility Score 6   Activity Tolerance   Sitting in Chair NT   Sitting Edge of Bed 10 mins   Standing 10 seconds   Comments limited due to pain and weakness   Edema / Skin Assessment   Edema / Skin  X   Comments B LE edema   Patient / Family Goals    Patient / Family Goal #1 none stated   Short Term Goals    Short Term Goal # 1 pt will go supine<>sit w/hob elevated and rails up w/Min A in 6tx   Short Term Goal # 2 pt will go sit<>stand w/fww w/Min A in 6tx   Short Term Goal # 3 pt will transfer bed<>chair w/fww w/Mod A in 6tx   Education Group   Education Provided Role of Physical Therapist   Role of Physical Therapist Patient Response Patient;Acceptance;Explanation;Demonstration;Verbal Demonstration;Action Demonstration;Reinforcement Needed   Problem List    Problems Pain;Impaired Bed Mobility;Impaired  Transfers;Impaired Ambulation;Functional Strength Deficit;Functional ROM Deficit;Impaired Balance;Decreased Activity Tolerance   Anticipated Discharge Equipment and Recommendations   DC Equipment Recommendations Unable to determine at this time   Discharge Recommendations Recommend post-acute placement for additional physical therapy services prior to discharge home   Interdisciplinary Plan of Care Collaboration   IDT Collaboration with  Nursing   Patient Position at End of Therapy In Bed;Call Light within Reach;Tray Table within Reach;Phone within Reach   Collaboration Comments aware of visit and recs   Session Information   Date / Session Number  4/5-1 (1/3, 4/11)
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
## 2022-04-06 LAB
ANION GAP SERPL CALC-SCNC: 8 MMOL/L (ref 7–16)
BACTERIA WND AEROBE CULT: ABNORMAL
BASOPHILS # BLD AUTO: 0 % (ref 0–1.8)
BASOPHILS # BLD: 0 K/UL (ref 0–0.12)
BUN SERPL-MCNC: 9 MG/DL (ref 8–22)
CALCIUM SERPL-MCNC: 7.6 MG/DL (ref 8.4–10.2)
CHLORIDE SERPL-SCNC: 97 MMOL/L (ref 96–112)
CO2 SERPL-SCNC: 24 MMOL/L (ref 20–33)
CREAT SERPL-MCNC: 0.53 MG/DL (ref 0.5–1.4)
CRP SERPL HS-MCNC: 26 MG/DL (ref 0–0.75)
EOSINOPHIL # BLD AUTO: 0.42 K/UL (ref 0–0.51)
EOSINOPHIL NFR BLD: 1 % (ref 0–6.9)
ERYTHROCYTE [DISTWIDTH] IN BLOOD BY AUTOMATED COUNT: 39.1 FL (ref 35.9–50)
ERYTHROCYTE [SEDIMENTATION RATE] IN BLOOD BY WESTERGREN METHOD: >140 MM/HOUR (ref 0–25)
GFR SERPLBLD CREATININE-BSD FMLA CKD-EPI: 108 ML/MIN/1.73 M 2
GLUCOSE SERPL-MCNC: 122 MG/DL (ref 65–99)
GRAM STN SPEC: ABNORMAL
HCT VFR BLD AUTO: 28.3 % (ref 37–47)
HGB BLD-MCNC: 9.9 G/DL (ref 12–16)
LYMPHOCYTES # BLD AUTO: 4.15 K/UL (ref 1–4.8)
LYMPHOCYTES NFR BLD: 10 % (ref 22–41)
MANUAL DIFF BLD: NORMAL
MCH RBC QN AUTO: 27.4 PG (ref 27–33)
MCHC RBC AUTO-ENTMCNC: 35 G/DL (ref 33.6–35)
MCV RBC AUTO: 78.4 FL (ref 81.4–97.8)
MONOCYTES # BLD AUTO: 1.25 K/UL (ref 0–0.85)
MONOCYTES NFR BLD AUTO: 3 % (ref 0–13.4)
NEUTROPHILS # BLD AUTO: 35.69 K/UL (ref 2–7.15)
NEUTROPHILS NFR BLD: 77 % (ref 44–72)
NEUTS BAND NFR BLD MANUAL: 9 % (ref 0–10)
NRBC # BLD AUTO: 0 K/UL
NRBC BLD-RTO: 0 /100 WBC
PLATELET # BLD AUTO: 348 K/UL (ref 164–446)
PLATELET BLD QL SMEAR: NORMAL
PMV BLD AUTO: 9.5 FL (ref 9–12.9)
POLYCHROMASIA BLD QL SMEAR: NORMAL
POTASSIUM SERPL-SCNC: 4.2 MMOL/L (ref 3.6–5.5)
PREALB SERPL-MCNC: 4 MG/DL (ref 18–38)
RBC # BLD AUTO: 3.61 M/UL (ref 4.2–5.4)
RBC BLD AUTO: PRESENT
SIGNIFICANT IND 70042: ABNORMAL
SITE SITE: ABNORMAL
SODIUM SERPL-SCNC: 129 MMOL/L (ref 135–145)
SOURCE SOURCE: ABNORMAL
WBC # BLD AUTO: 41.5 K/UL (ref 4.8–10.8)

## 2022-04-06 PROCEDURE — 85025 COMPLETE CBC W/AUTO DIFF WBC: CPT

## 2022-04-06 PROCEDURE — 700102 HCHG RX REV CODE 250 W/ 637 OVERRIDE(OP): Performed by: HOSPITALIST

## 2022-04-06 PROCEDURE — A9270 NON-COVERED ITEM OR SERVICE: HCPCS | Performed by: INTERNAL MEDICINE

## 2022-04-06 PROCEDURE — 700102 HCHG RX REV CODE 250 W/ 637 OVERRIDE(OP): Performed by: INTERNAL MEDICINE

## 2022-04-06 PROCEDURE — 84134 ASSAY OF PREALBUMIN: CPT

## 2022-04-06 PROCEDURE — 85007 BL SMEAR W/DIFF WBC COUNT: CPT

## 2022-04-06 PROCEDURE — 770006 HCHG ROOM/CARE - MED/SURG/GYN SEMI*

## 2022-04-06 PROCEDURE — 700102 HCHG RX REV CODE 250 W/ 637 OVERRIDE(OP): Performed by: STUDENT IN AN ORGANIZED HEALTH CARE EDUCATION/TRAINING PROGRAM

## 2022-04-06 PROCEDURE — 94640 AIRWAY INHALATION TREATMENT: CPT

## 2022-04-06 PROCEDURE — 700111 HCHG RX REV CODE 636 W/ 250 OVERRIDE (IP): Performed by: HOSPITALIST

## 2022-04-06 PROCEDURE — A9270 NON-COVERED ITEM OR SERVICE: HCPCS | Performed by: STUDENT IN AN ORGANIZED HEALTH CARE EDUCATION/TRAINING PROGRAM

## 2022-04-06 PROCEDURE — 99233 SBSQ HOSP IP/OBS HIGH 50: CPT | Performed by: INTERNAL MEDICINE

## 2022-04-06 PROCEDURE — 700111 HCHG RX REV CODE 636 W/ 250 OVERRIDE (IP): Performed by: INTERNAL MEDICINE

## 2022-04-06 PROCEDURE — 94760 N-INVAS EAR/PLS OXIMETRY 1: CPT

## 2022-04-06 PROCEDURE — 85652 RBC SED RATE AUTOMATED: CPT

## 2022-04-06 PROCEDURE — 99254 IP/OBS CNSLTJ NEW/EST MOD 60: CPT | Performed by: ORTHOPAEDIC SURGERY

## 2022-04-06 PROCEDURE — 700105 HCHG RX REV CODE 258: Performed by: HOSPITALIST

## 2022-04-06 PROCEDURE — A9270 NON-COVERED ITEM OR SERVICE: HCPCS | Performed by: HOSPITALIST

## 2022-04-06 PROCEDURE — 80048 BASIC METABOLIC PNL TOTAL CA: CPT

## 2022-04-06 PROCEDURE — 86140 C-REACTIVE PROTEIN: CPT

## 2022-04-06 RX ORDER — OXYCODONE AND ACETAMINOPHEN 10; 325 MG/1; MG/1
1 TABLET ORAL EVERY 4 HOURS PRN
Status: DISCONTINUED | OUTPATIENT
Start: 2022-04-06 | End: 2022-04-12 | Stop reason: HOSPADM

## 2022-04-06 RX ORDER — OXYCODONE HYDROCHLORIDE AND ACETAMINOPHEN 5; 325 MG/1; MG/1
1 TABLET ORAL EVERY 4 HOURS PRN
Status: DISCONTINUED | OUTPATIENT
Start: 2022-04-06 | End: 2022-04-12 | Stop reason: HOSPADM

## 2022-04-06 RX ADMIN — OXYCODONE HYDROCHLORIDE 5 MG: 5 TABLET ORAL at 01:17

## 2022-04-06 RX ADMIN — MORPHINE SULFATE 15 MG: 15 TABLET, FILM COATED, EXTENDED RELEASE ORAL at 18:29

## 2022-04-06 RX ADMIN — SODIUM HYPOCHLORITE 1 ML: 1.25 SOLUTION TOPICAL at 06:00

## 2022-04-06 RX ADMIN — HEPARIN SODIUM 5000 UNITS: 5000 INJECTION INTRAVENOUS; SUBCUTANEOUS at 13:16

## 2022-04-06 RX ADMIN — FLUTICASONE PROPIONATE 88 MCG: 44 AEROSOL, METERED RESPIRATORY (INHALATION) at 18:56

## 2022-04-06 RX ADMIN — HEPARIN SODIUM 5000 UNITS: 5000 INJECTION INTRAVENOUS; SUBCUTANEOUS at 06:00

## 2022-04-06 RX ADMIN — PIPERACILLIN AND TAZOBACTAM 4.5 G: 4; .5 INJECTION, POWDER, LYOPHILIZED, FOR SOLUTION INTRAVENOUS; PARENTERAL at 06:00

## 2022-04-06 RX ADMIN — GABAPENTIN 600 MG: 300 CAPSULE ORAL at 05:59

## 2022-04-06 RX ADMIN — METHOCARBAMOL 500 MG: 500 TABLET ORAL at 18:29

## 2022-04-06 RX ADMIN — MORPHINE SULFATE 15 MG: 15 TABLET, FILM COATED, EXTENDED RELEASE ORAL at 05:59

## 2022-04-06 RX ADMIN — OXYCODONE AND ACETAMINOPHEN 1 TABLET: 10; 325 TABLET ORAL at 21:47

## 2022-04-06 RX ADMIN — Medication 1 TABLET: at 05:59

## 2022-04-06 RX ADMIN — PIPERACILLIN AND TAZOBACTAM 4.5 G: 4; .5 INJECTION, POWDER, LYOPHILIZED, FOR SOLUTION INTRAVENOUS; PARENTERAL at 13:16

## 2022-04-06 RX ADMIN — SENNOSIDES AND DOCUSATE SODIUM 2 TABLET: 50; 8.6 TABLET ORAL at 05:59

## 2022-04-06 RX ADMIN — FLUTICASONE PROPIONATE 88 MCG: 44 AEROSOL, METERED RESPIRATORY (INHALATION) at 07:47

## 2022-04-06 RX ADMIN — METHOCARBAMOL 500 MG: 500 TABLET ORAL at 05:59

## 2022-04-06 RX ADMIN — FUROSEMIDE 40 MG: 10 INJECTION, SOLUTION INTRAMUSCULAR; INTRAVENOUS at 05:59

## 2022-04-06 RX ADMIN — GABAPENTIN 600 MG: 300 CAPSULE ORAL at 21:47

## 2022-04-06 RX ADMIN — OXYCODONE AND ACETAMINOPHEN 1 TABLET: 10; 325 TABLET ORAL at 13:25

## 2022-04-06 RX ADMIN — SODIUM HYPOCHLORITE 1 ML: 1.25 SOLUTION TOPICAL at 18:29

## 2022-04-06 RX ADMIN — GABAPENTIN 600 MG: 300 CAPSULE ORAL at 18:29

## 2022-04-06 RX ADMIN — HEPARIN SODIUM 5000 UNITS: 5000 INJECTION INTRAVENOUS; SUBCUTANEOUS at 21:48

## 2022-04-06 RX ADMIN — PIPERACILLIN AND TAZOBACTAM 4.5 G: 4; .5 INJECTION, POWDER, LYOPHILIZED, FOR SOLUTION INTRAVENOUS; PARENTERAL at 21:48

## 2022-04-06 ASSESSMENT — ENCOUNTER SYMPTOMS
BACK PAIN: 1
HEADACHES: 0
ABDOMINAL PAIN: 0
NAUSEA: 0
VOMITING: 0
CHILLS: 0
WHEEZING: 0
FEVER: 0
MYALGIAS: 1
DIARRHEA: 0
SHORTNESS OF BREATH: 0

## 2022-04-06 ASSESSMENT — PAIN DESCRIPTION - PAIN TYPE
TYPE: ACUTE PAIN;CHRONIC PAIN
TYPE: ACUTE PAIN
TYPE: ACUTE PAIN;CHRONIC PAIN

## 2022-04-06 NOTE — DISCHARGE PLANNING
Anticipated Discharge Disposition: SNF    Action:     1020: LSW met with pt at bedside. Pt states there may be surgical intervention for her leg wound. LSW introduced SNF placement. Pt states she is not wanting to go to SNF. LSW left SNF list at bedside.    1300: LSW met with pt at bedside to discuss SNF again, pt still stating she does not prefer SNF placement.     Barriers to Discharge: SNF choice, POC/GOC    Plan: LSW to follow and assist as needed.
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
## 2022-04-06 NOTE — PROGRESS NOTES
Pt is asleep in bed. Awakes to voice. VSS. Pt c/o 4/10 pain in RLE. Declines pain interventions at this time. Dressings to RLE are clean, dry, and intact. Changed dressings per order. Pt is requesting to rest. Fall precautions in place.

## 2022-04-06 NOTE — DISCHARGE PLANNING
[facility] Transitional Care Coordination    Referral from:  Dr. Mendoza    Insurance Provider on Facesheet: [employer]    Potential Rehab Diagnosis: TBD    Chart review indicates patient may have on going medical management and may have therapy needs to possibly meet inpatient rehab facility criteria with the goal of returning to community.    D/C support: TBD     Physiatry consultation forwarded per protocol.      Thank you for the referral.

## 2022-04-06 NOTE — DISCHARGE PLANNING
Anticipated Discharge Disposition: Anticipate SNF vs Rehab     Action: Pt A&Ox4, on 1L O2, 6 clicks scores of 13/6. [facility] following.      Barriers to Discharge: medical clearance, placement     Plan:  f/u with medical team to discuss DC needs and barriers

## 2022-04-06 NOTE — THERAPY
Missed Therapy     Patient Name: Karine Ovalle  Age:  56 y.o., Sex:  female  Medical Record #: [id number]  Today's Date: 4/6/2022    Discussed missed therapy with RN       04/06/22 1014   Treatment Variance   Reason For Missed Therapy Medical - Other (Please Comment)   Total Time Spent   Total Time Spent (Mins) 3   Initial Contact Note    Initial Contact Note  Order Received and Verified, Speech Therapy Evaluation in Progress with Full Report to Follow.   Interdisciplinary Plan of Care Collaboration   IDT Collaboration with  Nursing   Collaboration Comments Order received for a clinical swallow evaluation. Per RN, pt is currently NPO for procedure so will follow for swallow evaluation as appropriate.

## 2022-04-06 NOTE — PROGRESS NOTES
Hospital Medicine Daily Progress Note    Date of Service  4/6/2022    Chief Complaint  Karine Ovalle is a 56 y.o. female admitted 4/3/2022 with worsening wound    Hospital Course  Karine Ovalle is a 56 y.o. female with a past medical history of bilateral lower extremity lymphedema, mild intermittent asthma with chronic back pain who presented 4/3/2022 with worsening right lower extremity pain and swelling.  The patient states that she burned her anterior aspect of the right leg at the end of December.  Subsequently she has had an open wound followed by wound care.  She was seen by wound care on 24 March and I reviewed the note and the wound care nurse noted the patient was noncompliant did remove the dressing.  She did have an increase in interval size of her ulceration from prior.  She is not currently on antibiotics.  She states she has not had any associated fevers.  She states that her dog stepped on her leg and she had increased pain since that time.  She has had increased swelling as well.        Interval Problem Update  4/4 Patient is seen and examined at the bedside.  She is a drowsy, but is able to stay awake to answer questions.  AO x4.  She reported severe pain of RLE.  On Dilaudid as needed.    MRSA screen negative, discontinued vancomycin  Previous wound culture 2/2022 positive for Pseudomonas aeruginosa, MSSA, Morganella morganii, continue Zosyn  Bladder culture, wound culture pending  Leukocytosis with WBC 15, trending down to 47. Denies diarrhea, dysuria  Sodium 129  Leukocytosis resolved  CT RLE negative for abscess or osteomyelitis    4/5 Patient continues to be tachycardic spiked a fever this afternoon.  Discussed case with infectious disease who recommended to continue Zosyn 4.5 g for now and to wait for repeat cultures.  Discussed with wound care who recommended EVELYNE/arterial ultrasound, they do not think patient needs surgical debridement at this point. Patient's main complaint is  her swollen legs she reports last time she was admitted she was placed on 2 g sodium restriction diet and IV Lasix with significant improvement. Also noted that patient with significant coughing while trying to eat lunch, she reports she has had difficulty since she had neck surgery but does ok when she eats slow, SLP monicaal ordered.     4/6 Wound culture growing group A strep, E. Coli, morganella morganii, pending sensitivities. Discussed with ID for now continue zosyn. Persistent wbc 41. Consulted orthopedic surgery to see if wound need debridement given continued leukocytosis and fever yesterday, spoke with Dr. Kellogg did not think patient needed debridement. EVELYNE no indicative of significant stenosis, arterial US not performed. After discussion with ID and ortho likely something else causing leukocytosis, HIV and procal pending. May need oncology and possible bone marrow bx if does not improve, so far bcx negative. Patient continues to have severe pain in her leg, increased pain regimen dosing.    I have personally seen and examined the patient at bedside. I discussed the plan of care with patient, bedside RN, charge RN,  and infectious disease and orthopedics.    Consultants/Specialty  Orthopedic surgery    Code Status  Full Code    Disposition  Patient is not medically cleared for discharge.   Anticipate discharge to to skilled nursing facility.  I have placed the appropriate orders for post-discharge needs.    Review of Systems  Review of Systems   Constitutional: Positive for malaise/fatigue. Negative for chills and fever.   Respiratory: Negative for shortness of breath and wheezing.    Cardiovascular: Positive for leg swelling. Negative for chest pain.   Gastrointestinal: Negative for abdominal pain, diarrhea, nausea and vomiting.   Genitourinary: Negative for dysuria, frequency and urgency.   Musculoskeletal: Positive for back pain and myalgias.   Neurological: Negative for headaches.         Physical Exam  Temp:  [36.6 °C (97.9 °F)-37.7 °C (99.9 °F)] 37.7 °C (99.9 °F)  Pulse:  [] 110  Resp:  [17-19] 17  BP: (115-131)/(69-76) 123/76  SpO2:  [94 %-99 %] 99 %    Physical Exam  Constitutional:       General: She is not in acute distress.     Appearance: She is obese. She is ill-appearing.   HENT:      Head: Normocephalic and atraumatic.   Eyes:      Conjunctiva/sclera: Conjunctivae normal.   Cardiovascular:      Rate and Rhythm: Normal rate and regular rhythm.      Pulses: Normal pulses.      Heart sounds: Normal heart sounds.   Pulmonary:      Effort: Pulmonary effort is normal.   Abdominal:      General: Bowel sounds are normal.      Palpations: Abdomen is soft.      Tenderness: There is no abdominal tenderness. There is no guarding.   Musculoskeletal:         General: Tenderness present.      Cervical back: Normal range of motion.      Right lower leg: Edema present.      Left lower leg: Edema present.      Comments: Right lower extremity erythema, swelling, tender, clean dressing in place   Skin:     General: Skin is warm.   Neurological:      Mental Status: She is alert and oriented to person, place, and time. Mental status is at baseline.         Fluids    Intake/Output Summary (Last 24 hours) at 4/6/2022 1501  Last data filed at 4/6/2022 0908  Gross per 24 hour   Intake 460 ml   Output 3600 ml   Net -3140 ml       Laboratory  Recent Labs     04/04/22  0300 04/05/22  0408 04/06/22  0230   WBC 47.8* 47.5* 41.5*   RBC 4.07* 3.73* 3.61*   HEMOGLOBIN 11.2* 10.4* 9.9*   HEMATOCRIT 31.6* 29.2* 28.3*   MCV 77.6* 78.3* 78.4*   MCH 27.5 27.9 27.4   MCHC 35.4* 35.6* 35.0   RDW 39.1 39.4 39.1   PLATELETCT 357 352 348   MPV 9.3 9.5 9.5     Recent Labs     04/04/22  0300 04/05/22  0408 04/06/22  0230   SODIUM 129* 130* 129*   POTASSIUM 4.1 4.2 4.2   CHLORIDE 96 97 97   CO2 24 24 24   GLUCOSE 88 78 122*   BUN 13 9 9   CREATININE 0.41* 0.37* 0.53   CALCIUM 7.8* 7.8* 7.6*                   Imaging  US-EVELYNE  SINGLE LEVEL UNILAT RIGHT   Final Result      DX-CHEST-PORTABLE (1 VIEW)   Final Result         1.  No acute cardiopulmonary disease.      CT-EXTREMITY, LOWER WITH RIGHT   Final Result      1.  There is subcutaneous edema in the right lower leg with a large anterior lower leg ulcer/wound defect.   2.  There is no subcutaneous fluid collection to suggest abscess.   3.  There is no CT evidence of osteomyelitis.      DX-TIBIA AND FIBULA RIGHT   Final Result      1.  Large ulcer/wound in the anterior mid right leg.   2.  No acute osseous abnormality.      US-EXTREMITY VENOUS LOWER UNILAT RIGHT   Final Result      1.  No evidence of Right  lower extremity deep venous thrombosis.      2.  Exam limited due to extensive subcutaneous edema.           Assessment/Plan  * Sepsis (HCC)- (present on admission)  Assessment & Plan  This is Sepsis Present on admission  SIRS criteria identified on my evaluation include: Tachycardia, with heart rate greater than 90 BPM and Leukocytosis, with WBC greater than 12,000  Source is cellulitis  Sepsis protocol initiated  Fluid resuscitation ordered per protocol  Crystalloid Fluid Administration: Fluid resuscitation ordered per standard protocol - 30 mL/kg per current or ideal body weight  IV antibiotics as appropriate for source of sepsis  Reassessment: I have reassessed the patient's hemodynamic status    Open wound of right lower extremity  Assessment & Plan  she burned her anterior aspect of the right leg at the end of December.  Subsequently she has had an open wound followed by wound care.   Noticed worsening wound per wound care  Previous wound culture 2/2022 positive for Pseudomonas aeruginosa, MSSA, Morganella morganii     Continue Zosyn  wound care consulted  Discussed with ID, continue zosyn for now, f/u wound cx  Ortho consulted, no need for surgical debridement     Leukocytosis- (present on admission)  Assessment & Plan  Marked leukocytosis.  Likely secondary to cellulitis and  wound infection  Per ID and ortho likely wound not causing significant leukocytosis   Blood culture, wound culture pending  HIV and procal pending   Consider oncology consult if not improving    CT scan of the right lower extremity negative for abscess and osteomyelitis  Continue Zosyn    Cellulitis- (present on admission)  Assessment & Plan  Started on Zosyn and vancomycin in the emergency room  MRSA screen negative, discontinue Vanco  Previous wound culture 2/2022 positive for Pseudomonas aeruginosa, MSSA, Morganella morganii     Continue Zosyn        Leg edema- (present on admission)  Assessment & Plan  R>L  Start IV lasix 40 mg   Diet- 2 g sodium restriction  Venous US negative for DVT    Edema- (present on admission)  Assessment & Plan  Extensive edema of the right lower extremity, will check CT scan to rule out underlying abscess  Consider restarting home diuretics when blood pressure is more stable    Morbid obesity with BMI of 40.0-44.9, adult (HCC)- (present on admission)  Assessment & Plan  BMI 44    Essential hypertension- (present on admission)  Assessment & Plan  Not currently on scheduled medications.  I will start as needed hydralazine labetalol for extreme hypertension    Hyponatremia- (present on admission)  Assessment & Plan  Hypovolemic hyponatremia  I expect Na to improve with IVF        VTE prophylaxis: SCDs/TEDs and heparin ppx    I have performed a physical exam and reviewed and updated ROS and Plan today (4/6/2022). In review of yesterday's note (4/5/2022), there are no changes except as documented above.

## 2022-04-06 NOTE — CONSULTS
"[facility] ORTHO TRAUMA    Ortho Trauma Consult Note    Assessment & Plan:  1) 56 y.o. malnourished, morbidly obese female with bilateral LE lymphedema and right anterior leg chronic wound.     No surgical intervention planned at this time. I am unimpressed by the extent of the wound and the mismatch of the patient's elevated WBC. Patient's WBC is not due solely to the right leg ulceration.  Recommed Santyl application three times daily to right leg wound under wet to dry dressings.   If wound does not improve with appropriate conservative care we would then consider surgical debridement.  No petra involvement noted with any imaging. This is purely a soft tissue issue. Plastic surgery may need to be involved at some point in time in the future for possible skin grafting after her infection resolves.  WB Status: WBAT RLE  DVT PPx: lovenox, SCDs  Continue IV antibiotic at direction of Infectious Disease.  Pain control per primary team.    Patient is in agreement with the above-mentioned plan; all questions were answered to their apparent satisfaction.    Subjective     Chief Complaint: right leg ulcer    History of Present Illness:  Karine Ovalle is a 56 y.o. malnourished, morbidly obese female with bilateral LE lymphedema and right anterior leg chronic wound.   We were consulted to evaluate the wound. Denies numbness/tingling in the extremity. Has no other questions or concerns.      Past Medical History:   Diagnosis Date   • Administrative encounter- RTC ACCESS/ADA PARATRANSIT ELIGIBILITY 6/4/2019   • Anemia    • Arthritis     osteo/hips and back   • At risk for falls    • Chronic back pain    • Dental disorder     lower denture   • Pain 12/04/2018    \"ALL OVER\", 10/10   • Pain 02/04/2019    arthritic pain   • Urinary incontinence     using pads       Past Surgical History:   Procedure Laterality Date   • HIP ARTHROPLASTY TOTAL Left 2/13/2019    Procedure: HIP ARTHROPLASTY TOTAL, Cabling of intra-operative calcar " "fracture;  Surgeon: Bryon Levine M.D.;  Location: SURGERY [facility];  Service: Orthopedics   • CERVICAL FUSION POSTERIOR  12/7/2018    Procedure: CERVICAL FUSION POSTERIOR- STAGE #2 C3-5 AND C3-T1;  Surgeon: Emre Mendoza III, M.D.;  Location: SURGERY [facility];  Service: Neurosurgery   • CERVICAL LAMINECTOMY POSTERIOR  12/7/2018    Procedure: CERVICAL LAMINECTOMY POSTERIOR C2-T1;  Surgeon: Emre Mendoza III, M.D.;  Location: SURGERY [facility];  Service: Neurosurgery   • CERVICAL DISK AND FUSION ANTERIOR  12/5/2018    Procedure: CERVICAL DISK AND FUSION ANTERIOR-STAGE #1 C4-5;  Surgeon: Emre Mendoza III, M.D.;  Location: SURGERY [facility];  Service: Neurosurgery   • CORPECTOMY  12/5/2018    Procedure: CORPECTOMY;  Surgeon: Emre Mendoza III, M.D.;  Location: SURGERY [facility];  Service: Neurosurgery   • HIP ARTHROPLASTY TOTAL Right 3/20/2018    Procedure: HIP ARTHROPLASTY TOTAL;  Surgeon: Bryon Levine M.D.;  Location: [facility];  Service: Orthopedics   • KNEE ARTHROPLASTY TOTAL Right 10/20/2015    Procedure: KNEE ARTHROPLASTY TOTAL;  Surgeon: Bryon Levine M.D.;  Location: [facility];  Service:    • APPENDECTOMY LAPAROSCOPIC  3/21/2013    Performed by Dali Queen M.D. at [facility]   • DEBRIDEMENT  5/17/2012    Performed by BRADLEY DYKES at [facility]   • PLASTIC SURGERY  2004    excess skin on arms and legs removed   • MAMMOPLASTY AUGMENTATION Bilateral 2004    breast implants and \"tummy tuck\"   • GASTRIC BYPASS LAPAROSCOPIC  2002    x 2   • ABDOMINAL EXPLORATION     • OTHER      breast augmentation 2004   • OTHER      Gastric bypass 2002       Medications  No current facility-administered medications on file prior to encounter.     Current Outpatient Medications on File Prior to Encounter   Medication Sig Dispense Refill   • Naproxen Sodium (ALEVE) 220 MG Cap Take 660 mg by mouth 2 times a day as needed (For " pain).     • phentermine 37.5 MG capsule Take 1 Capsule by mouth every morning for 90 days. 90 Capsule 0   • [START ON 5/28/2022] phentermine 37.5 MG capsule Take 1 Capsule by mouth every morning for 90 days. 90 Capsule 0   • furosemide (LASIX) 40 MG Tab Take 1 Tablet by mouth every day. Indications: Edema 90 Tablet 4   • potassium chloride SA (KDUR) 20 MEQ Tab CR Take 1 Tablet by mouth every day. 90 Tablet 4   • methocarbamol (ROBAXIN) 500 MG Tab Take 2 Tablets by mouth 4 times a day as needed. (Patient taking differently: Take 1,000 mg by mouth 4 times a day as needed. Indications: Musculoskeletal Pain) 240 Tablet 5   • fluticasone (FLOVENT HFA) 44 MCG/ACT Aerosol Inhale 2 Puffs 2 times a day. Everyday maintenance steroid inhaler 1 Each 11   • VITAMIN D PO Take 1 Capsule by mouth every day.     • gabapentin (NEURONTIN) 600 MG tablet Take 1 tablet by mouth 4 times daily (Patient taking differently: Take 600 mg by mouth 4 times a day.) 360 Tablet 0   • ipratropium-albuterol (DUONEB) 0.5-2.5 (3) MG/3ML nebulizer solution Take 3 mL by nebulization every 6 hours as needed for Shortness of Breath. 120 Each 11   • Albuterol Sulfate (PROAIR RESPICLICK) 108 (90 Base) MCG/ACT AEROSOL POWDER, BREATH ACTIVATED Inhale 1 Puff every 6 hours as needed (Wheezing). 1 Each 5   • morphine ER (MS CONTIN) 15 MG Tab CR tablet Take 15 mg by mouth every 12 hours.     • oxyCODONE-acetaminophen (PERCOCET-10)  MG Tab Take 1 Tablet by mouth 4 times a day.  0   • CALCIUM CARBONATE-VITAMIN D PO Take 1 Tablet by mouth every day. Indications: Low Amount of Calcium in the Blood     • Multiple Vitamins-Minerals (ONE-A-DAY WOMENS 50 PLUS PO) Take 1 tablet by mouth every day.     • ferrous sulfate 325 (65 Fe) MG tablet Take 1 Tab by mouth every morning with breakfast. 30 Tab 1       Allergies  Tobacco [nicotiana tabacum]    ROS  Negative otherwise than mentioned in HPI.    Family History   Problem Relation Age of Onset   • Diabetes Mother    •  Heart Disease Mother        Social History     Socioeconomic History   • Marital status: Single   Tobacco Use   • Smoking status: Never Smoker   • Smokeless tobacco: Never Used   Vaping Use   • Vaping Use: Never used   Substance and Sexual Activity   • Alcohol use: Not Currently     Comment: 3-4 per week; pt stops alcohol use 1-week ago   • Drug use: No     Frequency: 4.0 times per week   • Sexual activity: Never   Social History Narrative    ** Merged History Encounter **            Objective     Current Vital Signs:  /76   Pulse (!) 110   Temp 37.7 °C (99.9 °F) (Axillary)   Resp 17   Ht 1.524 m (5')   Wt 103 kg (228 lb 1.6 oz)   LMP  (LMP Unknown)   SpO2 99%   BMI 44.55 kg/m²     Physical Exam:  General: Awake, appropriate, cooperative, and in no acute distress  HEENT: Normocephalic, atraumatic  CV: Equal peripheral pulses  Resp: Equal chest rise bilaterally  GI: Abdomen soft, nontender, no rebound, no guarding  Neuro: Cranial nerves II-XII grossly intact  Psych: Alert and oriented x3  Extremities:   RLE: ulcerations over the right anterior leg demonstrate fibrinous tissue mixed with granulation tissue      Data Review:   Recent Labs     04/04/22  0300 04/05/22  0408 04/06/22  0230   WBC 47.8* 47.5* 41.5*   RBC 4.07* 3.73* 3.61*   HEMOGLOBIN 11.2* 10.4* 9.9*   HEMATOCRIT 31.6* 29.2* 28.3*   MCV 77.6* 78.3* 78.4*   MCH 27.5 27.9 27.4   MCHC 35.4* 35.6* 35.0   RDW 39.1 39.4 39.1   PLATELETCT 357 352 348   MPV 9.3 9.5 9.5     Recent Labs     04/04/22  0300 04/05/22  0408 04/06/22  0230   SODIUM 129* 130* 129*   POTASSIUM 4.1 4.2 4.2   CHLORIDE 96 97 97   CO2 24 24 24   GLUCOSE 88 78 122*   BUN 13 9 9           Imaging: radiographs and CT demonstrate no petra involvement and no identifiable abscess formation.    Sascha Kellogg M.D.  Date: 4/6/2022  Time: 10:35 AM

## 2022-04-06 NOTE — CARE PLAN
The patient is Stable - Low risk of patient condition declining or worsening    Shift Goals  Clinical Goals: patient will only complains of mild pain  Patient Goals: rest comfortably    Progress made toward(s) clinical / shift goals:  Patient is still having severe pain to RLE.Received pain medication as ordered.    Patient is not progressing towards the following goals:      Problem: Pain - Standard  Goal: Alleviation of pain or a reduction in pain to the patient’s comfort goal  Outcome: Progressing     Problem: Knowledge Deficit - Standard  Goal: Patient and family/care givers will demonstrate understanding of plan of care, disease process/condition, diagnostic tests and medications  Outcome: Progressing     Problem: Mechanical Ventilation  Goal: Patient will be able to express needs and understand communication  Outcome: Progressing     Problem: Physical Regulation  Goal: Diagnostic test results will improve  Outcome: Progressing     Problem: Fall Risk  Goal: Patient will remain free from falls  Outcome: Progressing     Problem: Physical Regulation  Goal: Signs and symptoms of infection will decrease  Outcome: Progressing     Problem: Fall Risk  Goal: Patient will remain free from falls  Outcome: Progressing     Problem: Skin Integrity  Goal: Skin integrity is maintained or improved  Outcome: Progressing

## 2022-04-06 NOTE — PROGRESS NOTES
Received patient from Night shift RN . Patient is awake and alert.  rest promoted. Swollen legs and arms still present. Dressing to RLE in placed, dry and intact. Overlay mattress in placed.  Fall precaution observed, kept bed in lowest position and call light within reach.Kept patient strict NPO as ordered, made patient aware

## 2022-04-07 LAB
ALBUMIN SERPL BCP-MCNC: 1.8 G/DL (ref 3.2–4.9)
ALBUMIN/GLOB SERPL: 0.4 G/DL
ALP SERPL-CCNC: 287 U/L (ref 30–99)
ALT SERPL-CCNC: 12 U/L (ref 2–50)
ANION GAP SERPL CALC-SCNC: 8 MMOL/L (ref 7–16)
AST SERPL-CCNC: 17 U/L (ref 12–45)
BASOPHILS # BLD AUTO: 0.5 % (ref 0–1.8)
BASOPHILS # BLD: 0.19 K/UL (ref 0–0.12)
BILIRUB SERPL-MCNC: 0.9 MG/DL (ref 0.1–1.5)
BUN SERPL-MCNC: 9 MG/DL (ref 8–22)
CALCIUM SERPL-MCNC: 7.9 MG/DL (ref 8.4–10.2)
CHLORIDE SERPL-SCNC: 98 MMOL/L (ref 96–112)
CO2 SERPL-SCNC: 25 MMOL/L (ref 20–33)
CREAT SERPL-MCNC: 0.47 MG/DL (ref 0.5–1.4)
EOSINOPHIL # BLD AUTO: 0.28 K/UL (ref 0–0.51)
EOSINOPHIL NFR BLD: 0.8 % (ref 0–6.9)
ERYTHROCYTE [DISTWIDTH] IN BLOOD BY AUTOMATED COUNT: 39.9 FL (ref 35.9–50)
GFR SERPLBLD CREATININE-BSD FMLA CKD-EPI: 111 ML/MIN/1.73 M 2
GLOBULIN SER CALC-MCNC: 5.1 G/DL (ref 1.9–3.5)
GLUCOSE SERPL-MCNC: 102 MG/DL (ref 65–99)
HCT VFR BLD AUTO: 27.1 % (ref 37–47)
HGB BLD-MCNC: 9.6 G/DL (ref 12–16)
HIV 1+2 AB+HIV1 P24 AG SERPL QL IA: NORMAL
IMM GRANULOCYTES # BLD AUTO: 1.68 K/UL (ref 0–0.11)
IMM GRANULOCYTES NFR BLD AUTO: 4.8 % (ref 0–0.9)
LYMPHOCYTES # BLD AUTO: 3.13 K/UL (ref 1–4.8)
LYMPHOCYTES NFR BLD: 9 % (ref 22–41)
MCH RBC QN AUTO: 28.2 PG (ref 27–33)
MCHC RBC AUTO-ENTMCNC: 35.4 G/DL (ref 33.6–35)
MCV RBC AUTO: 79.7 FL (ref 81.4–97.8)
MONOCYTES # BLD AUTO: 1.01 K/UL (ref 0–0.85)
MONOCYTES NFR BLD AUTO: 2.9 % (ref 0–13.4)
NEUTROPHILS # BLD AUTO: 28.65 K/UL (ref 2–7.15)
NEUTROPHILS NFR BLD: 82 % (ref 44–72)
NRBC # BLD AUTO: 0 K/UL
NRBC BLD-RTO: 0 /100 WBC
PLATELET # BLD AUTO: 311 K/UL (ref 164–446)
PMV BLD AUTO: 9.6 FL (ref 9–12.9)
POTASSIUM SERPL-SCNC: 3.8 MMOL/L (ref 3.6–5.5)
PROCALCITONIN SERPL-MCNC: 1.55 NG/ML
PROT SERPL-MCNC: 6.9 G/DL (ref 6–8.2)
RBC # BLD AUTO: 3.4 M/UL (ref 4.2–5.4)
SODIUM SERPL-SCNC: 131 MMOL/L (ref 135–145)
VIT B12 SERPL-MCNC: 2351 PG/ML (ref 211–911)
WBC # BLD AUTO: 34.9 K/UL (ref 4.8–10.8)

## 2022-04-07 PROCEDURE — A9270 NON-COVERED ITEM OR SERVICE: HCPCS | Performed by: STUDENT IN AN ORGANIZED HEALTH CARE EDUCATION/TRAINING PROGRAM

## 2022-04-07 PROCEDURE — 87389 HIV-1 AG W/HIV-1&-2 AB AG IA: CPT

## 2022-04-07 PROCEDURE — 99232 SBSQ HOSP IP/OBS MODERATE 35: CPT | Performed by: INTERNAL MEDICINE

## 2022-04-07 PROCEDURE — 85025 COMPLETE CBC W/AUTO DIFF WBC: CPT

## 2022-04-07 PROCEDURE — 700102 HCHG RX REV CODE 250 W/ 637 OVERRIDE(OP): Performed by: INTERNAL MEDICINE

## 2022-04-07 PROCEDURE — 770006 HCHG ROOM/CARE - MED/SURG/GYN SEMI*

## 2022-04-07 PROCEDURE — 700102 HCHG RX REV CODE 250 W/ 637 OVERRIDE(OP): Performed by: STUDENT IN AN ORGANIZED HEALTH CARE EDUCATION/TRAINING PROGRAM

## 2022-04-07 PROCEDURE — 700111 HCHG RX REV CODE 636 W/ 250 OVERRIDE (IP): Performed by: HOSPITALIST

## 2022-04-07 PROCEDURE — A9270 NON-COVERED ITEM OR SERVICE: HCPCS | Performed by: INTERNAL MEDICINE

## 2022-04-07 PROCEDURE — 92610 EVALUATE SWALLOWING FUNCTION: CPT

## 2022-04-07 PROCEDURE — 80053 COMPREHEN METABOLIC PANEL: CPT

## 2022-04-07 PROCEDURE — 700102 HCHG RX REV CODE 250 W/ 637 OVERRIDE(OP): Performed by: HOSPITALIST

## 2022-04-07 PROCEDURE — 700111 HCHG RX REV CODE 636 W/ 250 OVERRIDE (IP): Performed by: INTERNAL MEDICINE

## 2022-04-07 PROCEDURE — 82607 VITAMIN B-12: CPT

## 2022-04-07 PROCEDURE — 84145 PROCALCITONIN (PCT): CPT

## 2022-04-07 PROCEDURE — 700105 HCHG RX REV CODE 258: Performed by: HOSPITALIST

## 2022-04-07 PROCEDURE — A9270 NON-COVERED ITEM OR SERVICE: HCPCS | Performed by: HOSPITALIST

## 2022-04-07 PROCEDURE — 94640 AIRWAY INHALATION TREATMENT: CPT

## 2022-04-07 PROCEDURE — 84425 ASSAY OF VITAMIN B-1: CPT

## 2022-04-07 RX ORDER — FUROSEMIDE 10 MG/ML
40 INJECTION INTRAMUSCULAR; INTRAVENOUS
Status: DISCONTINUED | OUTPATIENT
Start: 2022-04-07 | End: 2022-04-12 | Stop reason: HOSPADM

## 2022-04-07 RX ADMIN — HEPARIN SODIUM 5000 UNITS: 5000 INJECTION INTRAVENOUS; SUBCUTANEOUS at 21:36

## 2022-04-07 RX ADMIN — FUROSEMIDE 40 MG: 10 INJECTION, SOLUTION INTRAMUSCULAR; INTRAVENOUS at 17:51

## 2022-04-07 RX ADMIN — PIPERACILLIN AND TAZOBACTAM 4.5 G: 4; .5 INJECTION, POWDER, LYOPHILIZED, FOR SOLUTION INTRAVENOUS; PARENTERAL at 05:46

## 2022-04-07 RX ADMIN — PIPERACILLIN AND TAZOBACTAM 4.5 G: 4; .5 INJECTION, POWDER, LYOPHILIZED, FOR SOLUTION INTRAVENOUS; PARENTERAL at 12:30

## 2022-04-07 RX ADMIN — HEPARIN SODIUM 5000 UNITS: 5000 INJECTION INTRAVENOUS; SUBCUTANEOUS at 05:54

## 2022-04-07 RX ADMIN — GABAPENTIN 600 MG: 300 CAPSULE ORAL at 12:30

## 2022-04-07 RX ADMIN — GABAPENTIN 600 MG: 300 CAPSULE ORAL at 21:36

## 2022-04-07 RX ADMIN — OXYCODONE AND ACETAMINOPHEN 1 TABLET: 10; 325 TABLET ORAL at 21:46

## 2022-04-07 RX ADMIN — MORPHINE SULFATE 15 MG: 15 TABLET, FILM COATED, EXTENDED RELEASE ORAL at 05:47

## 2022-04-07 RX ADMIN — METHOCARBAMOL 500 MG: 500 TABLET ORAL at 05:47

## 2022-04-07 RX ADMIN — FLUTICASONE PROPIONATE 88 MCG: 44 AEROSOL, METERED RESPIRATORY (INHALATION) at 19:06

## 2022-04-07 RX ADMIN — OXYCODONE AND ACETAMINOPHEN 1 TABLET: 10; 325 TABLET ORAL at 14:19

## 2022-04-07 RX ADMIN — SODIUM HYPOCHLORITE 1 ML: 1.25 SOLUTION TOPICAL at 07:41

## 2022-04-07 RX ADMIN — PIPERACILLIN AND TAZOBACTAM 4.5 G: 4; .5 INJECTION, POWDER, LYOPHILIZED, FOR SOLUTION INTRAVENOUS; PARENTERAL at 21:36

## 2022-04-07 RX ADMIN — SENNOSIDES AND DOCUSATE SODIUM 2 TABLET: 50; 8.6 TABLET ORAL at 05:48

## 2022-04-07 RX ADMIN — MORPHINE SULFATE 15 MG: 15 TABLET, FILM COATED, EXTENDED RELEASE ORAL at 17:50

## 2022-04-07 RX ADMIN — Medication 1 TABLET: at 05:47

## 2022-04-07 RX ADMIN — FLUTICASONE PROPIONATE 88 MCG: 44 AEROSOL, METERED RESPIRATORY (INHALATION) at 07:50

## 2022-04-07 RX ADMIN — METHOCARBAMOL 500 MG: 500 TABLET ORAL at 17:50

## 2022-04-07 RX ADMIN — HEPARIN SODIUM 5000 UNITS: 5000 INJECTION INTRAVENOUS; SUBCUTANEOUS at 14:19

## 2022-04-07 RX ADMIN — GABAPENTIN 600 MG: 300 CAPSULE ORAL at 17:50

## 2022-04-07 RX ADMIN — OXYCODONE AND ACETAMINOPHEN 1 TABLET: 10; 325 TABLET ORAL at 09:43

## 2022-04-07 RX ADMIN — FUROSEMIDE 40 MG: 10 INJECTION, SOLUTION INTRAMUSCULAR; INTRAVENOUS at 05:54

## 2022-04-07 RX ADMIN — GABAPENTIN 600 MG: 300 CAPSULE ORAL at 05:47

## 2022-04-07 ASSESSMENT — ENCOUNTER SYMPTOMS
WHEEZING: 0
SHORTNESS OF BREATH: 0
BACK PAIN: 1
NAUSEA: 0
MYALGIAS: 1
ABDOMINAL PAIN: 0
FEVER: 0
HEADACHES: 0
DIARRHEA: 0
VOMITING: 0
CHILLS: 0

## 2022-04-07 ASSESSMENT — PATIENT HEALTH QUESTIONNAIRE - PHQ9
1. LITTLE INTEREST OR PLEASURE IN DOING THINGS: NOT AT ALL
SUM OF ALL RESPONSES TO PHQ9 QUESTIONS 1 AND 2: 0
2. FEELING DOWN, DEPRESSED, IRRITABLE, OR HOPELESS: NOT AT ALL

## 2022-04-07 ASSESSMENT — PAIN DESCRIPTION - PAIN TYPE
TYPE: ACUTE PAIN
TYPE: ACUTE PAIN
TYPE: ACUTE PAIN;CHRONIC PAIN

## 2022-04-07 NOTE — PROGRESS NOTES
Hospital Medicine Daily Progress Note    Date of Service  4/7/2022    Chief Complaint  Karine Ovalle is a 56 y.o. female admitted 4/3/2022 with worsening wound    Hospital Course  Karine Ovalle is a 56 y.o. female with a past medical history of bilateral lower extremity lymphedema, mild intermittent asthma with chronic back pain who presented 4/3/2022 with worsening right lower extremity pain and swelling.  The patient states that she burned her anterior aspect of the right leg at the end of December.  Subsequently she has had an open wound followed by wound care.  She was seen by wound care on 24 March and I reviewed the note and the wound care nurse noted the patient was noncompliant did remove the dressing.  She did have an increase in interval size of her ulceration from prior.  She is not currently on antibiotics.  She states she has not had any associated fevers.  She states that her dog stepped on her leg and she had increased pain since that time.  She has had increased swelling as well.        Interval Problem Update  4/4 Patient is seen and examined at the bedside.  She is a drowsy, but is able to stay awake to answer questions.  AO x4.  She reported severe pain of RLE.  On Dilaudid as needed.    MRSA screen negative, discontinued vancomycin  Previous wound culture 2/2022 positive for Pseudomonas aeruginosa, MSSA, Morganella morganii, continue Zosyn  Bladder culture, wound culture pending  Leukocytosis with WBC 15, trending down to 47. Denies diarrhea, dysuria  Sodium 129  Leukocytosis resolved  CT RLE negative for abscess or osteomyelitis    4/5 Patient continues to be tachycardic spiked a fever this afternoon.  Discussed case with infectious disease who recommended to continue Zosyn 4.5 g for now and to wait for repeat cultures.  Discussed with wound care who recommended EVELYNE/arterial ultrasound, they do not think patient needs surgical debridement at this point. Patient's main complaint is  her swollen legs she reports last time she was admitted she was placed on 2 g sodium restriction diet and IV Lasix with significant improvement. Also noted that patient with significant coughing while trying to eat lunch, she reports she has had difficulty since she had neck surgery but does ok when she eats slow, SLP eval ordered.     4/6 Wound culture growing group A strep, E. Coli, morganella morganii, pending sensitivities. Discussed with ID for now continue zosyn. Persistent wbc 41. Consulted orthopedic surgery to see if wound need debridement given continued leukocytosis and fever yesterday, spoke with Dr. Kellogg did not think patient needed debridement. EVELYNE no indicative of significant stenosis, arterial US not performed. After discussion with ID and ortho likely something else causing leukocytosis, HIV and procal pending. May need oncology and possible bone marrow bx if does not improve, so far bcx negative. Patient continues to have severe pain in her leg, increased pain regimen dosing.    4/7  Patient appears to be pleasantly confused.  WBC down to 34, afebrile  Urine output insufficient on IV Lasix  SLP recommendations noted      I have personally seen and examined the patient at bedside. I discussed the plan of care with patient, bedside RN, charge RN,  and infectious disease and orthopedics.    Consultants/Specialty  Orthopedic surgery    Code Status  Full Code    Disposition  Patient is not medically cleared for discharge.   Anticipate discharge to to skilled nursing facility.  I have placed the appropriate orders for post-discharge needs.    Review of Systems  Review of Systems   Constitutional: Positive for malaise/fatigue. Negative for chills and fever.   Respiratory: Negative for shortness of breath and wheezing.    Cardiovascular: Positive for leg swelling. Negative for chest pain.   Gastrointestinal: Negative for abdominal pain, diarrhea, nausea and vomiting.   Genitourinary:  Negative for dysuria, frequency and urgency.   Musculoskeletal: Positive for back pain and myalgias.   Neurological: Negative for headaches. Speech change:      Psychiatric/Behavioral: Suicidal ideas:           Physical Exam  Temp:  [36.7 °C (98 °F)-36.8 °C (98.2 °F)] 36.7 °C (98 °F)  Pulse:  [] 98  Resp:  [18] 18  BP: (108-129)/(56-82) 108/56  SpO2:  [90 %-99 %] 90 %    Physical Exam  Constitutional:       General: She is not in acute distress.     Appearance: She is obese. She is ill-appearing.   HENT:      Head: Normocephalic and atraumatic.   Eyes:      Conjunctiva/sclera: Conjunctivae normal.   Cardiovascular:      Rate and Rhythm: Normal rate and regular rhythm.      Pulses: Normal pulses.      Heart sounds: Normal heart sounds.   Pulmonary:      Effort: Pulmonary effort is normal.   Abdominal:      General: Bowel sounds are normal.      Palpations: Abdomen is soft.      Tenderness: There is no abdominal tenderness. There is no guarding.   Musculoskeletal:         General: Tenderness present.      Cervical back: Normal range of motion.      Right lower leg: Edema present.      Left lower leg: Edema present.      Comments: Right lower extremity erythema, swelling, tender, clean dressing in place   Skin:     General: Skin is warm.   Neurological:      Mental Status: She is alert and oriented to person, place, and time. Mental status is at baseline.         Fluids    Intake/Output Summary (Last 24 hours) at 4/7/2022 1233  Last data filed at 4/7/2022 0600  Gross per 24 hour   Intake 480 ml   Output 2600 ml   Net -2120 ml       Laboratory  Recent Labs     04/05/22  0408 04/06/22  0230 04/07/22  0230   WBC 47.5* 41.5* 34.9*   RBC 3.73* 3.61* 3.40*   HEMOGLOBIN 10.4* 9.9* 9.6*   HEMATOCRIT 29.2* 28.3* 27.1*   MCV 78.3* 78.4* 79.7*   MCH 27.9 27.4 28.2   MCHC 35.6* 35.0 35.4*   RDW 39.4 39.1 39.9   PLATELETCT 352 348 311   MPV 9.5 9.5 9.6     Recent Labs     04/05/22  0408 04/06/22  0230 04/07/22  0230   SODIUM  130* 129* 131*   POTASSIUM 4.2 4.2 3.8   CHLORIDE 97 97 98   CO2 24 24 25   GLUCOSE 78 122* 102*   BUN 9 9 9   CREATININE 0.37* 0.53 0.47*   CALCIUM 7.8* 7.6* 7.9*                   Imaging  US-EVELYNE SINGLE LEVEL UNILAT RIGHT   Final Result      DX-CHEST-PORTABLE (1 VIEW)   Final Result         1.  No acute cardiopulmonary disease.      CT-EXTREMITY, LOWER WITH RIGHT   Final Result      1.  There is subcutaneous edema in the right lower leg with a large anterior lower leg ulcer/wound defect.   2.  There is no subcutaneous fluid collection to suggest abscess.   3.  There is no CT evidence of osteomyelitis.      DX-TIBIA AND FIBULA RIGHT   Final Result      1.  Large ulcer/wound in the anterior mid right leg.   2.  No acute osseous abnormality.      US-EXTREMITY VENOUS LOWER UNILAT RIGHT   Final Result      1.  No evidence of Right  lower extremity deep venous thrombosis.      2.  Exam limited due to extensive subcutaneous edema.           Assessment/Plan  * Sepsis (HCC)- (present on admission)  Assessment & Plan  This is Sepsis Present on admission  SIRS criteria identified on my evaluation include: Tachycardia, with heart rate greater than 90 BPM and Leukocytosis, with WBC greater than 12,000  Source is cellulitis  Sepsis protocol initiated  Fluid resuscitation ordered per protocol  Crystalloid Fluid Administration: Fluid resuscitation ordered per standard protocol - 30 mL/kg per current or ideal body weight  IV antibiotics as appropriate for source of sepsis  Reassessment: I have reassessed the patient's hemodynamic status    Resolved    Open wound of right lower extremity  Assessment & Plan  she burned her anterior aspect of the right leg at the end of December.  Subsequently she has had an open wound followed by wound care.   Noticed worsening wound per wound care  Previous wound culture 2/2022 positive for Pseudomonas aeruginosa, MSSA, Morganella morganii     Continue Zosyn  wound care consulted  Discussed with  ID, continue zosyn for now, f/u wound cx  Ortho consulted, no need for surgical debridement     Leukocytosis- (present on admission)  Assessment & Plan  Marked leukocytosis.  Likely secondary to cellulitis and wound infection  Per ID and ortho likely wound not causing significant leukocytosis   Blood culture, wound culture pending  HIV and procal pending   Consider oncology consult if not improving    CT scan of the right lower extremity negative for abscess and osteomyelitis  Continue Zosyn    4/7 WBC is trending down    Cellulitis- (present on admission)  Assessment & Plan  Started on Zosyn and vancomycin in the emergency room  MRSA screen negative, discontinue Vanco  Previous wound culture 2/2022 positive for Pseudomonas aeruginosa, MSSA, Morganella morganii     Continue Zosyn        Leg edema- (present on admission)  Assessment & Plan  R>L  Start IV lasix 40 mg   Diet- 2 g sodium restriction  Venous US negative for DVT    Edema- (present on admission)  Assessment & Plan  Extensive edema of the right lower extremity, will check CT scan to rule out underlying abscess  Consider restarting home diuretics when blood pressure is more stable    Morbid obesity with BMI of 40.0-44.9, adult (HCC)- (present on admission)  Assessment & Plan  BMI 44    Essential hypertension- (present on admission)  Assessment & Plan  Not currently on scheduled medications.  I will start as needed hydralazine labetalol for extreme hypertension    Hyponatremia- (present on admission)  Assessment & Plan  Hypovolemic hyponatremia  I expect Na to improve with IVF        VTE prophylaxis: SCDs/TEDs and heparin ppx    I have performed a physical exam and reviewed and updated ROS and Plan today (4/7/2022). In review of yesterday's note (4/6/2022), there are no changes except as documented above.

## 2022-04-07 NOTE — THERAPY
"Speech Language Pathology   Clinical Swallow Evaluation     Patient Name: Karine Ovalle  AGE:  56 y.o., SEX:  female  Medical Record #: [id number]  Today's Date: 4/7/2022     Precautions  Precautions: Fall Risk  Comments: B LE edema    HPI:  55 y/o admitted on 4/3 for R lower extremity pain. Dx chest found \"No acute cardiopulmonary disease.\" Last seen by SLP in 2018 where a dysphagia III/thin liquid diet was recommended.    PMHx:  bilateral lower extremity lyphedema, mild intermittent asthma, chronic pain      Level of Consciousness: Alert  Affect/Behavior: Appropriate  Follows Directives: Yes  Hearing: Functional hearing  Vision: Functional vision      Prior Living Situation & Level of Function:  Pt endorsed she lives at home with her significant other. She endorsed having an ACDF procedure 3 years ago and mild dysphagia s/p surgery. She reported difficulty when eating spaghetti the other day when feeling \"rushed\" but denied difficulty swallowing when she took her time.      Oral Mechanism Evaluation  Facial Symmetry: Equal  Facial Sensation: Equal  Labial Observations: WFL  Lingual Observations: Midline  Dentition: Scattered dentition  Comments: missing all bottom teeth, partial top      Voice  Quality: WFL  Resonance: WFL  Intensity: Appropriate  Cough: did not assess  Comments:      Current Method of Nutrition   Oral diet (regular/thin liquid textures)       Assessment  Positioning: Solano's (60-90 degrees)  Bolus Administration: Patient  Oxygen Requirements: 1 L Nasal Cannula  Factor(s) Affecting Performance: None    Swallowing Trials  Ice: Not tested  Thin Liquid (TN0): WFL  Mildly Thick Liquid (MT2): Not tested  Liquidised (LQ3): Not tested  Pureed (PU4): Not tested  Minced & Moist (MM5): Not tested  Soft & Bite Sized (SB6): WFL  Easy to Chew (EC7): Not tested  Regular (RG7): WFL    Comments:  No overt s/sx of aspiration with trials of her regular/thin liquid breakfast. She had difficulty biting into " English muffin but denied difficulty with mastication. Timely mastication with further trials and vocal quality clear following the swallow. She independently fed self but required assistance opening containers. Pt requesting meats chopped.       Clinical Impressions  Pt is presenting with a functional oropharyngeal with implementation of swallowing compensatory strategies (small bits/sips, up at 90* during meals, slow rate, chew adequately).       Recommendations  1.  Continue regular/thin liquid diet with chopped meats  2.  Instrumentation: None indicated at this time  3.  Swallowing Instructions & Precautions:   Supervision: Independent  Positioning: Fully upright and midline during oral intake  Medication: Whole with liquid  Strategies: Small bites/sips, sit up at 90* during meals  Oral Care: BID      Plan    Recommend Speech Therapy 2 times per week until therapy goals are met for the following treatments:  Dysphagia Training and Patient / Family / Caregiver Education.    Discharge Recommendations: (P) Anticipate that the patient will have no further speech therapy needs after discharge from the hospital       Objective       04/07/22 0907   Precautions   Precautions Fall Risk   Comments B LE edema   Vitals   O2 (LPM) 1   O2 Delivery Device Silicone Nasal Cannula   Pain 0 - 10 Group   Therapist Pain Assessment Post Activity Pain Same as Prior to Activity;Nurse Notified;0   Prior Living Situation   Housing / Facility 1 Story Apartment / Condo   Lives with - Patient's Self Care Capacity Friends   Prior Level Of Function   Communication Within Functional Limits   Swallow Impaired   Dentition Poor Quality    Hearing Within Functional Limits for Evaluation   Vision Within Functional Limits for Evaluation   Patient's Primary Language English   Occupation (Pre-Hospital Vocational) Employed Full Time;Requires Sitting, Desk, Computer Tasks   Short Term Goals   Short Term Goal # 1 Pt will consume a regular/thin liquid  diet with no overt s/sx of aspiration   Education Group   Education Provided Dysphagia   Dysphagia Patient Response Patient;Acceptance;Explanation;Verbal Demonstration   Anticipated Discharge Needs   Discharge Recommendations Anticipate that the patient will have no further speech therapy needs after discharge from the hospital   Therapy Recommendations Upon DC Not Indicated   Interdisciplinary Plan of Care Collaboration   IDT Collaboration with  Nursing   Patient Position at End of Therapy Seated;In Bed;Call Light within Reach;Tray Table within Reach;Phone within Reach

## 2022-04-07 NOTE — DISCHARGE PLANNING
Please review the consult from Dr. Rock regarding post acute recommendations.  [facility] will no longer follow.  Please reach out to myself once barriers have been extinguished or with any questions.

## 2022-04-07 NOTE — PROGRESS NOTES
Received report from day shift RN. Greeted patient and tended to immediate needs and informed patient I would be back within the next hour. Reviewed pt's chart to include medications due, lab values for the day and upcoming due times, and orders. Pt able to situate self in bed to keep pressure off bottom; right leg elevated; pain meds given per orders and reassessed. Bed in low locked position; with x2 side rails up, non-slip socks in place before ambulating; call bell and personal items within reach of patient. Reminded patient to use call bell before getting out of bed.      0300: Gwen from Lab called with critical result of 34.9 WBC at 0251. Critical lab result read back to Gwen in Lab.   Dr. Graff notified of critical lab result at 0328 (on phone with Dr. Graff regarding another critical patient). No further orders.     0800: Wound care completed at end of shift; pt tolerated well.

## 2022-04-07 NOTE — CARE PLAN
Problem: Respiratory  Goal: Patient will achieve/maintain optimum respiratory ventilation and gas exchange  Description: Target End Date:  Prior to discharge or change in level of care    Document on Assessment flowsheet    1.  Assess and monitor rate, rhythm, depth and effort of respiration  2.  Breath sounds assessed qshift and/or as needed  3.  Assess O2 saturation, administer/titrate oxygen as ordered  4.  Position patient for maximum ventilatory efficiency  5.  Turn, cough, and deep breath with splinting to improve effectiveness  6.  Collaborate with RT to administer medication/treatments per order  7.  Encourage use of incentive spirometer and encourage patient to cough after use and utilize splinting techniques if applicable  8.  Airway suctioning  9.  Monitor sputum production for changes in color, consistency and frequency  10. Perform frequent oral hygiene  11. Alternate physical activity with rest periods  4/7/2022 0300 by Arron Edward, RRT  Outcome: Progressing  4/7/2022 0259 by Arron Edward, RRT  Outcome: Progressing     Problem: Bronchoconstriction  Goal: Improve in air movement and diminished wheezing  Description: Target End Date:  2 to 3 days    1.  Implement inhaled treatments  2.  Evaluate and manage medication effects  Outcome: Progressing

## 2022-04-07 NOTE — CARE PLAN
The patient is Watcher - Medium risk of patient condition declining or worsening    Shift Goals  Clinical Goals: pain mgmnt; AM labs; IV ABX  Patient Goals: rest comfortably    Progress made toward(s) clinical / shift goals:    Problem: Knowledge Deficit - Standard  Goal: Patient and family/care givers will demonstrate understanding of plan of care, disease process/condition, diagnostic tests and medications  Outcome: Progressing  Note: Updated patient on plan of care. Encouraged to ask questions and voice concerns. Answered all questions regarding care. Agrees with plan of care.      Problem: Hemodynamics  Goal: Patient's hemodynamics, fluid balance and neurologic status will be stable or improve  Outcome: Progressing       Patient is not progressing towards the following goals:      Problem: Pain - Standard  Goal: Alleviation of pain or a reduction in pain to the patient’s comfort goal  Outcome: Not Progressing  Note: Assessed patient's pain every time entering room; medicated per MAR  in order to reach patient's comfort goal and reassessed within time frame; pt sees pain mgmnt doctor; not on current meds; Will continue to monitor and assess.

## 2022-04-08 LAB
ALBUMIN SERPL BCP-MCNC: 1.9 G/DL (ref 3.2–4.9)
ALBUMIN/GLOB SERPL: 0.4 G/DL
ALP SERPL-CCNC: 341 U/L (ref 30–99)
ALT SERPL-CCNC: 14 U/L (ref 2–50)
ANION GAP SERPL CALC-SCNC: 6 MMOL/L (ref 7–16)
AST SERPL-CCNC: 28 U/L (ref 12–45)
BACTERIA BLD CULT: NORMAL
BACTERIA BLD CULT: NORMAL
BASOPHILS # BLD AUTO: 0.4 % (ref 0–1.8)
BASOPHILS # BLD: 0.11 K/UL (ref 0–0.12)
BILIRUB SERPL-MCNC: 0.8 MG/DL (ref 0.1–1.5)
BUN SERPL-MCNC: 8 MG/DL (ref 8–22)
CALCIUM SERPL-MCNC: 7.9 MG/DL (ref 8.4–10.2)
CHLORIDE SERPL-SCNC: 92 MMOL/L (ref 96–112)
CO2 SERPL-SCNC: 30 MMOL/L (ref 20–33)
CREAT SERPL-MCNC: 0.47 MG/DL (ref 0.5–1.4)
EOSINOPHIL # BLD AUTO: 0.11 K/UL (ref 0–0.51)
EOSINOPHIL NFR BLD: 0.4 % (ref 0–6.9)
ERYTHROCYTE [DISTWIDTH] IN BLOOD BY AUTOMATED COUNT: 40 FL (ref 35.9–50)
GFR SERPLBLD CREATININE-BSD FMLA CKD-EPI: 111 ML/MIN/1.73 M 2
GLOBULIN SER CALC-MCNC: 5.4 G/DL (ref 1.9–3.5)
GLUCOSE SERPL-MCNC: 92 MG/DL (ref 65–99)
HCT VFR BLD AUTO: 27.4 % (ref 37–47)
HGB BLD-MCNC: 9.5 G/DL (ref 12–16)
IMM GRANULOCYTES # BLD AUTO: 1.01 K/UL (ref 0–0.11)
IMM GRANULOCYTES NFR BLD AUTO: 3.5 % (ref 0–0.9)
LYMPHOCYTES # BLD AUTO: 3.35 K/UL (ref 1–4.8)
LYMPHOCYTES NFR BLD: 11.5 % (ref 22–41)
MCH RBC QN AUTO: 27.5 PG (ref 27–33)
MCHC RBC AUTO-ENTMCNC: 34.7 G/DL (ref 33.6–35)
MCV RBC AUTO: 79.2 FL (ref 81.4–97.8)
MONOCYTES # BLD AUTO: 1.21 K/UL (ref 0–0.85)
MONOCYTES NFR BLD AUTO: 4.1 % (ref 0–13.4)
NEUTROPHILS # BLD AUTO: 23.41 K/UL (ref 2–7.15)
NEUTROPHILS NFR BLD: 80.1 % (ref 44–72)
NRBC # BLD AUTO: 0 K/UL
NRBC BLD-RTO: 0 /100 WBC
PLATELET # BLD AUTO: 387 K/UL (ref 164–446)
PMV BLD AUTO: 9.8 FL (ref 9–12.9)
POTASSIUM SERPL-SCNC: 4 MMOL/L (ref 3.6–5.5)
PROT SERPL-MCNC: 7.3 G/DL (ref 6–8.2)
RBC # BLD AUTO: 3.46 M/UL (ref 4.2–5.4)
SIGNIFICANT IND 70042: NORMAL
SIGNIFICANT IND 70042: NORMAL
SITE SITE: NORMAL
SITE SITE: NORMAL
SODIUM SERPL-SCNC: 128 MMOL/L (ref 135–145)
SOURCE SOURCE: NORMAL
SOURCE SOURCE: NORMAL
WBC # BLD AUTO: 29.2 K/UL (ref 4.8–10.8)

## 2022-04-08 PROCEDURE — A9270 NON-COVERED ITEM OR SERVICE: HCPCS | Performed by: STUDENT IN AN ORGANIZED HEALTH CARE EDUCATION/TRAINING PROGRAM

## 2022-04-08 PROCEDURE — A9270 NON-COVERED ITEM OR SERVICE: HCPCS | Performed by: INTERNAL MEDICINE

## 2022-04-08 PROCEDURE — 700111 HCHG RX REV CODE 636 W/ 250 OVERRIDE (IP): Performed by: INTERNAL MEDICINE

## 2022-04-08 PROCEDURE — 700105 HCHG RX REV CODE 258: Performed by: HOSPITALIST

## 2022-04-08 PROCEDURE — 700102 HCHG RX REV CODE 250 W/ 637 OVERRIDE(OP): Performed by: HOSPITALIST

## 2022-04-08 PROCEDURE — 80053 COMPREHEN METABOLIC PANEL: CPT

## 2022-04-08 PROCEDURE — 85025 COMPLETE CBC W/AUTO DIFF WBC: CPT

## 2022-04-08 PROCEDURE — 94640 AIRWAY INHALATION TREATMENT: CPT

## 2022-04-08 PROCEDURE — 700111 HCHG RX REV CODE 636 W/ 250 OVERRIDE (IP): Performed by: HOSPITALIST

## 2022-04-08 PROCEDURE — 700102 HCHG RX REV CODE 250 W/ 637 OVERRIDE(OP): Performed by: INTERNAL MEDICINE

## 2022-04-08 PROCEDURE — A9270 NON-COVERED ITEM OR SERVICE: HCPCS | Performed by: HOSPITALIST

## 2022-04-08 PROCEDURE — 770006 HCHG ROOM/CARE - MED/SURG/GYN SEMI*

## 2022-04-08 PROCEDURE — 94760 N-INVAS EAR/PLS OXIMETRY 1: CPT

## 2022-04-08 PROCEDURE — 700102 HCHG RX REV CODE 250 W/ 637 OVERRIDE(OP): Performed by: STUDENT IN AN ORGANIZED HEALTH CARE EDUCATION/TRAINING PROGRAM

## 2022-04-08 PROCEDURE — 92526 ORAL FUNCTION THERAPY: CPT

## 2022-04-08 PROCEDURE — 99232 SBSQ HOSP IP/OBS MODERATE 35: CPT | Performed by: STUDENT IN AN ORGANIZED HEALTH CARE EDUCATION/TRAINING PROGRAM

## 2022-04-08 RX ADMIN — METHOCARBAMOL 500 MG: 500 TABLET ORAL at 17:15

## 2022-04-08 RX ADMIN — SENNOSIDES AND DOCUSATE SODIUM 2 TABLET: 50; 8.6 TABLET ORAL at 17:12

## 2022-04-08 RX ADMIN — FUROSEMIDE 40 MG: 10 INJECTION, SOLUTION INTRAMUSCULAR; INTRAVENOUS at 05:11

## 2022-04-08 RX ADMIN — MORPHINE SULFATE 15 MG: 15 TABLET, FILM COATED, EXTENDED RELEASE ORAL at 05:11

## 2022-04-08 RX ADMIN — HYDROMORPHONE HYDROCHLORIDE 0.25 MG: 1 INJECTION, SOLUTION INTRAMUSCULAR; INTRAVENOUS; SUBCUTANEOUS at 23:37

## 2022-04-08 RX ADMIN — HEPARIN SODIUM 5000 UNITS: 5000 INJECTION INTRAVENOUS; SUBCUTANEOUS at 22:06

## 2022-04-08 RX ADMIN — SODIUM HYPOCHLORITE 1 ML: 1.25 SOLUTION TOPICAL at 06:00

## 2022-04-08 RX ADMIN — OXYCODONE AND ACETAMINOPHEN 1 TABLET: 10; 325 TABLET ORAL at 08:11

## 2022-04-08 RX ADMIN — GABAPENTIN 600 MG: 300 CAPSULE ORAL at 05:11

## 2022-04-08 RX ADMIN — FLUTICASONE PROPIONATE 88 MCG: 44 AEROSOL, METERED RESPIRATORY (INHALATION) at 19:03

## 2022-04-08 RX ADMIN — FLUTICASONE PROPIONATE 88 MCG: 44 AEROSOL, METERED RESPIRATORY (INHALATION) at 07:16

## 2022-04-08 RX ADMIN — HYDROMORPHONE HYDROCHLORIDE 0.25 MG: 1 INJECTION, SOLUTION INTRAMUSCULAR; INTRAVENOUS; SUBCUTANEOUS at 06:38

## 2022-04-08 RX ADMIN — HYDROMORPHONE HYDROCHLORIDE 0.25 MG: 1 INJECTION, SOLUTION INTRAMUSCULAR; INTRAVENOUS; SUBCUTANEOUS at 23:14

## 2022-04-08 RX ADMIN — HEPARIN SODIUM 5000 UNITS: 5000 INJECTION INTRAVENOUS; SUBCUTANEOUS at 13:38

## 2022-04-08 RX ADMIN — GABAPENTIN 600 MG: 300 CAPSULE ORAL at 17:12

## 2022-04-08 RX ADMIN — MORPHINE SULFATE 15 MG: 15 TABLET, FILM COATED, EXTENDED RELEASE ORAL at 17:12

## 2022-04-08 RX ADMIN — GABAPENTIN 600 MG: 300 CAPSULE ORAL at 13:36

## 2022-04-08 RX ADMIN — PIPERACILLIN AND TAZOBACTAM 4.5 G: 4; .5 INJECTION, POWDER, LYOPHILIZED, FOR SOLUTION INTRAVENOUS; PARENTERAL at 13:37

## 2022-04-08 RX ADMIN — OXYCODONE AND ACETAMINOPHEN 1 TABLET: 5; 325 TABLET ORAL at 14:09

## 2022-04-08 RX ADMIN — FUROSEMIDE 40 MG: 10 INJECTION, SOLUTION INTRAMUSCULAR; INTRAVENOUS at 17:15

## 2022-04-08 RX ADMIN — HEPARIN SODIUM 5000 UNITS: 5000 INJECTION INTRAVENOUS; SUBCUTANEOUS at 05:10

## 2022-04-08 RX ADMIN — METHOCARBAMOL 500 MG: 500 TABLET ORAL at 05:11

## 2022-04-08 RX ADMIN — GABAPENTIN 600 MG: 300 CAPSULE ORAL at 22:06

## 2022-04-08 RX ADMIN — Medication 1 TABLET: at 05:11

## 2022-04-08 RX ADMIN — PIPERACILLIN AND TAZOBACTAM 4.5 G: 4; .5 INJECTION, POWDER, LYOPHILIZED, FOR SOLUTION INTRAVENOUS; PARENTERAL at 05:09

## 2022-04-08 RX ADMIN — OXYCODONE AND ACETAMINOPHEN 1 TABLET: 10; 325 TABLET ORAL at 22:05

## 2022-04-08 ASSESSMENT — ENCOUNTER SYMPTOMS
SHORTNESS OF BREATH: 0
DIARRHEA: 0
MYALGIAS: 1
WHEEZING: 0
HEADACHES: 0
VOMITING: 0
BACK PAIN: 1
FEVER: 0
ABDOMINAL PAIN: 0
NECK PAIN: 0
NAUSEA: 0
CHILLS: 0

## 2022-04-08 ASSESSMENT — PAIN DESCRIPTION - PAIN TYPE
TYPE: ACUTE PAIN

## 2022-04-08 ASSESSMENT — FIBROSIS 4 INDEX: FIB4 SCORE: 1.08

## 2022-04-08 NOTE — CARE PLAN
The patient is Stable - Low risk of patient condition declining or worsening    Shift Goals  Clinical Goals: control pain  Patient Goals: control pain, sleep    Progress made toward(s) clinical / shift goals:        Problem: Pain - Standard  Goal: Alleviation of pain or a reduction in pain to the patient’s comfort goal  Outcome: Progressing     Problem: Knowledge Deficit - Standard  Goal: Patient and family/care givers will demonstrate understanding of plan of care, disease process/condition, diagnostic tests and medications  Outcome: Progressing     Problem: Physical Regulation  Goal: Signs and symptoms of infection will decrease  Outcome: Progressing       Patient is not progressing towards the following goals:

## 2022-04-08 NOTE — DISCHARGE PLANNING
Anticipated Discharge Disposition: Anticipate Home with HH     Action: Spoke to pt at bedside. Pt continues to refuse SNF.     Barriers to Discharge: medical clearance, placement refused by pt     Plan:  f/u with medical team to discuss DC needs/barriers

## 2022-04-08 NOTE — DISCHARGE PLANNING
Received Choice form at [phone]  Agency/Facility Name: [facility]  Referral sent per Choice form @ [id number]      unable to assess

## 2022-04-08 NOTE — PROGRESS NOTES
Hospital Medicine Daily Progress Note    Date of Service  4/8/2022    Chief Complaint  Karine Ovalle is a 56 y.o. female admitted 4/3/2022 with worsening wound    Hospital Course  Karine Ovalle is a 56 y.o. female with a past medical history of bilateral lower extremity lymphedema, mild intermittent asthma with chronic back pain who presented 4/3/2022 with worsening right lower extremity pain and swelling.  The patient states that she burned her anterior aspect of the right leg at the end of December.  Subsequently she has had an open wound followed by wound care.  She was seen by wound care on 24 March and I reviewed the note and the wound care nurse noted the patient was noncompliant did remove the dressing.  She did have an increase in interval size of her ulceration from prior.  She is not currently on antibiotics.  She states she has not had any associated fevers.  She states that her dog stepped on her leg and she had increased pain since that time.  She has had increased swelling as well.        Interval Problem Update  4/4 Patient is seen and examined at the bedside.  She is a drowsy, but is able to stay awake to answer questions.  AO x4.  She reported severe pain of RLE.  On Dilaudid as needed.    MRSA screen negative, discontinued vancomycin  Previous wound culture 2/2022 positive for Pseudomonas aeruginosa, MSSA, Morganella morganii, continue Zosyn  Bladder culture, wound culture pending  Leukocytosis with WBC 15, trending down to 47. Denies diarrhea, dysuria  Sodium 129  Leukocytosis resolved  CT RLE negative for abscess or osteomyelitis    4/5 Patient continues to be tachycardic spiked a fever this afternoon.  Discussed case with infectious disease who recommended to continue Zosyn 4.5 g for now and to wait for repeat cultures.  Discussed with wound care who recommended EVELYNE/arterial ultrasound, they do not think patient needs surgical debridement at this point. Patient's main complaint is  her swollen legs she reports last time she was admitted she was placed on 2 g sodium restriction diet and IV Lasix with significant improvement. Also noted that patient with significant coughing while trying to eat lunch, she reports she has had difficulty since she had neck surgery but does ok when she eats slow, SLP eval ordered.     4/6 Wound culture growing group A strep, E. Coli, morganella morganii, pending sensitivities. Discussed with ID for now continue zosyn. Persistent wbc 41. Consulted orthopedic surgery to see if wound need debridement given continued leukocytosis and fever yesterday, spoke with Dr. Kellogg did not think patient needed debridement. EVELYNE no indicative of significant stenosis, arterial US not performed. After discussion with ID and ortho likely something else causing leukocytosis, HIV and procal pending. May need oncology and possible bone marrow bx if does not improve, so far bcx negative. Patient continues to have severe pain in her leg, increased pain regimen dosing.    4/7  Patient appears to be pleasantly confused.  WBC down to 34, afebrile  Urine output insufficient on IV Lasix  SLP recommendations noted    4/8: Patient complaining of leg pain reevaluated the wound.  We will continue antibiotics.  White count is downtrending.  Her cultures have been growing E. coli, Morganella morganii and strep pyogenes.  Home health ordered.  Patient denies diarrhea do not suspect C. difficile.      I have personally seen and examined the patient at bedside. I discussed the plan of care with patient, bedside RN, charge RN,  and infectious disease and orthopedics.    Consultants/Specialty  Orthopedic surgery    Code Status  Full Code    Disposition  Patient is not medically cleared for discharge.   Anticipate discharge to to skilled nursing facility.  I have placed the appropriate orders for post-discharge needs.    Review of Systems  Review of Systems   Constitutional: Positive for  malaise/fatigue. Negative for chills and fever.   Respiratory: Negative for shortness of breath and wheezing.    Cardiovascular: Positive for leg swelling. Negative for chest pain.   Gastrointestinal: Negative for abdominal pain, diarrhea, nausea and vomiting.   Genitourinary: Negative for dysuria, frequency and urgency.   Musculoskeletal: Positive for back pain and myalgias. Negative for neck pain.   Neurological: Negative for headaches. Speech change:      Psychiatric/Behavioral: Suicidal ideas:      All other systems reviewed and are negative.       Physical Exam  Temp:  [36.6 °C (97.9 °F)-37.7 °C (99.8 °F)] 36.9 °C (98.5 °F)  Pulse:  [] 105  Resp:  [16-20] 16  BP: ()/(51-77) 99/54  SpO2:  [90 %-97 %] 90 %    Physical Exam  Vitals and nursing note reviewed.   Constitutional:       General: She is not in acute distress.     Appearance: She is obese. She is ill-appearing.   HENT:      Head: Normocephalic and atraumatic.   Eyes:      General: No scleral icterus.        Right eye: No discharge.         Left eye: No discharge.   Cardiovascular:      Rate and Rhythm: Normal rate and regular rhythm.      Heart sounds: Normal heart sounds.   Pulmonary:      Effort: Pulmonary effort is normal. No respiratory distress.      Breath sounds: Normal breath sounds.   Abdominal:      General: Bowel sounds are normal.      Palpations: Abdomen is soft.      Tenderness: There is no abdominal tenderness. There is no guarding.   Musculoskeletal:         General: Tenderness present.      Cervical back: Normal range of motion.      Right lower leg: Edema present.      Left lower leg: Edema present.      Comments: Right lower extremity erythema, swelling, tender, clean dressing in place   Skin:     General: Skin is warm and dry.      Coloration: Skin is not jaundiced.   Neurological:      Mental Status: She is alert and oriented to person, place, and time. Mental status is at baseline.   Psychiatric:         Mood and Affect:  Mood normal.         Fluids    Intake/Output Summary (Last 24 hours) at 4/8/2022 1239  Last data filed at 4/8/2022 0916  Gross per 24 hour   Intake 120 ml   Output 4000 ml   Net -3880 ml       Laboratory  Recent Labs     04/06/22 0230 04/07/22 0230 04/08/22  0310   WBC 41.5* 34.9* 29.2*   RBC 3.61* 3.40* 3.46*   HEMOGLOBIN 9.9* 9.6* 9.5*   HEMATOCRIT 28.3* 27.1* 27.4*   MCV 78.4* 79.7* 79.2*   MCH 27.4 28.2 27.5   MCHC 35.0 35.4* 34.7   RDW 39.1 39.9 40.0   PLATELETCT 348 311 387   MPV 9.5 9.6 9.8     Recent Labs     04/06/22 0230 04/07/22 0230 04/08/22  0310   SODIUM 129* 131* 128*   POTASSIUM 4.2 3.8 4.0   CHLORIDE 97 98 92*   CO2 24 25 30   GLUCOSE 122* 102* 92   BUN 9 9 8   CREATININE 0.53 0.47* 0.47*   CALCIUM 7.6* 7.9* 7.9*                   Imaging  US-EVELYNE SINGLE LEVEL UNILAT RIGHT   Final Result      DX-CHEST-PORTABLE (1 VIEW)   Final Result         1.  No acute cardiopulmonary disease.      CT-EXTREMITY, LOWER WITH RIGHT   Final Result      1.  There is subcutaneous edema in the right lower leg with a large anterior lower leg ulcer/wound defect.   2.  There is no subcutaneous fluid collection to suggest abscess.   3.  There is no CT evidence of osteomyelitis.      DX-TIBIA AND FIBULA RIGHT   Final Result      1.  Large ulcer/wound in the anterior mid right leg.   2.  No acute osseous abnormality.      US-EXTREMITY VENOUS LOWER UNILAT RIGHT   Final Result      1.  No evidence of Right  lower extremity deep venous thrombosis.      2.  Exam limited due to extensive subcutaneous edema.           Assessment/Plan  * Sepsis (HCC)- (present on admission)  Assessment & Plan  This is Sepsis Present on admission  SIRS criteria identified on my evaluation include: Tachycardia, with heart rate greater than 90 BPM and Leukocytosis, with WBC greater than 12,000  Source is cellulitis  Sepsis protocol initiated  Fluid resuscitation ordered per protocol  Crystalloid Fluid Administration: Fluid resuscitation ordered per  standard protocol - 30 mL/kg per current or ideal body weight  IV antibiotics as appropriate for source of sepsis  Reassessment: I have reassessed the patient's hemodynamic status    Resolved    Open wound of right lower extremity  Assessment & Plan  she burned her anterior aspect of the right leg at the end of December.  Subsequently she has had an open wound followed by wound care.   Noticed worsening wound per wound care  Previous wound culture 2/2022 positive for Pseudomonas aeruginosa, MSSA, Morganella morganii     Continue Zosyn  wound care consulted  Discussed with ID, continue zosyn for now, f/u wound cx  Ortho consulted, no need for surgical debridement     Leukocytosis- (present on admission)  Assessment & Plan  Marked leukocytosis.  Likely secondary to cellulitis and wound infection  Per ID and ortho likely wound not causing significant leukocytosis   Blood culture, wound culture pending  HIV and procal pending   Consider oncology consult if not improving    CT scan of the right lower extremity negative for abscess and osteomyelitis  Continue Zosyn    4/8 white count is downtrending.  Procalcitonin continues to be elevated.  Wound reevaluated does not appear to have underlying abscess.  For now continue antibiotics and trend WBC.     Cellulitis- (present on admission)  Assessment & Plan  Started on Zosyn and vancomycin in the emergency room  MRSA screen negative, discontinue Vanco  Wound culture positive for E Coli, M Morganii, Strep Pyogenes  Continue Zosyn        Leg edema- (present on admission)  Assessment & Plan  R>L  Start IV lasix 40 mg   Diet- 2 g sodium restriction  Venous US negative for DVT    Edema- (present on admission)  Assessment & Plan  Extensive edema of the right lower extremity, will check CT scan to rule out underlying abscess  Consider restarting home diuretics when blood pressure is more stable    Morbid obesity with BMI of 40.0-44.9, adult (HCC)- (present on admission)  Assessment  & Plan  BMI 44    Essential hypertension- (present on admission)  Assessment & Plan  Not currently on scheduled medications.  I will start as needed hydralazine labetalol for extreme hypertension    Hyponatremia- (present on admission)  Assessment & Plan  Hypovolemic hyponatremia  I expect Na to improve with IVF        VTE prophylaxis: SCDs/TEDs and heparin ppx    I have performed a physical exam and reviewed and updated ROS and Plan today (4/8/2022). In review of yesterday's note (4/7/2022), there are no changes except as documented above.

## 2022-04-08 NOTE — WOUND TEAM
"[facility]  Inpatient Services  Initial Wound and Skin Care Evaluation    Admission Date: 4/3/2022     Last order of IP CONSULT TO WOUND CARE was found on 4/3/2022 from Hospital Encounter on 4/3/2022     HPI, PMH, SH: Reviewed    Past Surgical History:   Procedure Laterality Date   • HIP ARTHROPLASTY TOTAL Left 2/13/2019    Procedure: HIP ARTHROPLASTY TOTAL, Cabling of intra-operative calcar fracture;  Surgeon: Bryon Levine M.D.;  Location: [facility];  Service: Orthopedics   • CERVICAL FUSION POSTERIOR  12/7/2018    Procedure: CERVICAL FUSION POSTERIOR- STAGE #2 C3-5 AND C3-T1;  Surgeon: Emre Mendoza III, M.D.;  Location: [facility];  Service: Neurosurgery   • CERVICAL LAMINECTOMY POSTERIOR  12/7/2018    Procedure: CERVICAL LAMINECTOMY POSTERIOR C2-T1;  Surgeon: Emre Mendoza III, M.D.;  Location: [facility];  Service: Neurosurgery   • CERVICAL DISK AND FUSION ANTERIOR  12/5/2018    Procedure: CERVICAL DISK AND FUSION ANTERIOR-STAGE #1 C4-5;  Surgeon: Emre Mendoza III, M.D.;  Location: [facility];  Service: Neurosurgery   • CORPECTOMY  12/5/2018    Procedure: CORPECTOMY;  Surgeon: Emre Mendoza III, M.D.;  Location: [facility];  Service: Neurosurgery   • HIP ARTHROPLASTY TOTAL Right 3/20/2018    Procedure: HIP ARTHROPLASTY TOTAL;  Surgeon: Bryon Levine M.D.;  Location: [facility];  Service: Orthopedics   • KNEE ARTHROPLASTY TOTAL Right 10/20/2015    Procedure: KNEE ARTHROPLASTY TOTAL;  Surgeon: Bryon Levine M.D.;  Location: SURGERY [facility];  Service:    • APPENDECTOMY LAPAROSCOPIC  3/21/2013    Performed by Dali Queen M.D. at [facility]   • DEBRIDEMENT  5/17/2012    Performed by BRADLEY DYKES at [facility]   • PLASTIC SURGERY  2004    excess skin on arms and legs removed   • MAMMOPLASTY AUGMENTATION Bilateral 2004    breast implants and \"tummy tuck\"   • GASTRIC " "BYPASS LAPAROSCOPIC  2002    x 2   • ABDOMINAL EXPLORATION     • OTHER      breast augmentation 2004   • OTHER      Gastric bypass 2002     Social History     Tobacco Use   • Smoking status: Never Smoker   • Smokeless tobacco: Never Used   Substance Use Topics   • Alcohol use: Not Currently     Comment: 3-4 per week; pt stops alcohol use 1-week ago     Chief Complaint   Patient presents with   • Leg Pain     R LOWER LEG  HAS WOUND ON IT W/ WOUND VAC IN PLACE  DOG STEPPED ON SAME LEG  NOW HAVING PAIN     Diagnosis: Cellulitis [L03.90]    Unit where seen by Wound Team: 2211/01     WOUND CONSULT/FOLLOW UP RELATED TO:  RLE     WOUND HISTORY:  Per H&P: \"Karine Ovalle is a 56 y.o. female with a past medical history of bilateral lower extremity lymphedema, mild intermittent asthma with chronic pain who presented 4/3/2022 with worsening right lower extremity pain and swelling.  Patient reports that the pain is severe rated as 10 out of 10.  No radiation to other places.  Pain is worse with attempting to move her extremity.  She denies having fevers but reports having intermittent chills.  Denies cough and shortness of breath cough.\"    WOUND ASSESSMENT/LDA                       Wound 01/11/22 Right Anteromedial LE (Active)   Site Assessment Yellow;Light Purple    Periwound Assessment Edema    Margins Defined edges    Closure Secondary intention    Drainage Amount None    Treatments Cleansed    Wound Cleansing Normal Saline Irrigation    Periwound Protectant Barrier Paste    Dressing Cleansing/Solutions 1/4 Strength Dakin's Solution    Dressing Options Moist Roll Gauze;Absorbent Abdominal Pad;Dry Roll Gauze    Dressing Changed Changed    Dressing Status Intact    Dressing Change/Treatment Frequency Every Shift, and As Needed    NEXT Dressing Change/Treatment Date 04/08/22    NEXT Weekly Photo (Inpatient Only) 04/11/22    Non-staged Wound Description Full thickness 04/04/22 1900   Wound Length (cm) 1.5 cm 04/04/22 1900 "   Wound Width (cm) 1.5 cm 04/04/22 1900   Wound Surface Area (cm^2) 2.25 cm^2 04/04/22 1900   Shape circular    Wound Odor None    Pulses 2+; DP    Exposed Structures BRIGIDA    Number of days: 83     Vascular:    EVELYNE:   No results found.    Lab Values:    Lab Results   Component Value Date/Time    WBC 29.2 (H) 04/08/2022 03:10 AM    WBC 8.2 08/14/2010 12:00 AM    RBC 3.46 (L) 04/08/2022 03:10 AM    RBC 4.75 08/14/2010 12:00 AM    HEMOGLOBIN 9.5 (L) 04/08/2022 03:10 AM    HEMATOCRIT 27.4 (L) 04/08/2022 03:10 AM    CREACTPROT 26.00 (H) 04/06/2022 02:30 AM    SEDRATEWES >140 (H) 04/06/2022 02:30 AM    HBA1C 5.1 07/26/2021 11:14 AM        Culture Results show:  Recent Results (from the past 720 hour(s))   CULTURE WOUND W/ GRAM STAIN    Collection Time: 04/03/22  5:00 PM    Specimen: Right Leg; Wound   Result Value Ref Range    Significant Indicator POS (POS)     Source WND     Site RIGHT LEG     Culture Result - (A)     Gram Stain Result       Few WBCs.  Many Gram positive cocci.  Many Gram negative rods.  Few Gram positive rods.      Culture Result Streptococcus pyogenes (Group A)  Heavy growth   (A)     Culture Result Escherichia coli  Heavy growth   (A)     Culture Result Morganella morganii  Heavy growth   (A)        Susceptibility    Escherichia coli - JAKOB     Ampicillin >16 Resistant mcg/mL     Ceftriaxone <=1 Sensitive mcg/mL     Cefazolin 4 Intermediate mcg/mL     Ciprofloxacin <=0.25 Sensitive mcg/mL     Cefepime <=2 Sensitive mcg/mL     Cefuroxime <=4 Sensitive mcg/mL     Ampicillin/sulbactam 16/8 Intermediate mcg/mL     Ertapenem <=0.5 Sensitive mcg/mL     Gentamicin >8 Resistant mcg/mL     Minocycline <=4 Sensitive mcg/mL     Moxifloxacin <=2 Sensitive mcg/mL     Pip/Tazobactam <=8 Sensitive mcg/mL     Trimeth/Sulfa >2/38 Resistant mcg/mL     Tigecycline <=2 Sensitive mcg/mL     Tobramycin 4 Sensitive mcg/mL    Morganella morganii - JAKOB     Ampicillin >16 Resistant mcg/mL     Ceftriaxone <=1 Sensitive mcg/mL      Cefazolin >16 Resistant mcg/mL     Ciprofloxacin <=0.25 Sensitive mcg/mL     Cefepime <=2 Sensitive mcg/mL     Cefuroxime >16 Resistant mcg/mL     Ampicillin/sulbactam 16/8 Intermediate mcg/mL     Ertapenem <=0.5 Sensitive mcg/mL     Gentamicin <=2 Sensitive mcg/mL     Minocycline <=4 Sensitive mcg/mL     Moxifloxacin <=2 Sensitive mcg/mL     Pip/Tazobactam <=8 Sensitive mcg/mL     Trimeth/Sulfa <=0.5/9.5 Sensitive mcg/mL     Tigecycline <=2 Resistant mcg/mL     Tobramycin <=2 Sensitive mcg/mL       Pain Level/Medicated:  Premedicated by primary RN to good effect       INTERVENTIONS BY WOUND TEAM:  Chart and images reviewed. Discussed with bedside RN. All areas of concern (based on picture review, LDA review and discussion with bedside RN) have been thoroughly assessed. Documentation of areas based on significant findings. This RN in to assess patient. Performed standard wound care which includes appropriate positioning, dressing removal and non-selective debridement. Pictures and measurements obtained weekly if/when required.  Preparation for Dressing removal: Dressing soaked with NS  Non-selectively Debrided with:  NS and gauze.  Sharp debridement: NA  Milagro wound: Cleansed with NS, Prepped with barrier paste  Primary Dressin/4 strength Dakins moistened roll gauze  Secondary (Outer) Dressing: abd pad secured with roll gauze    Interdisciplinary consultation: Patient, Bedside RN     EVALUATION / RATIONALE FOR TREATMENT:  Most Recent Date:   Assessed pt d/t to concern for worsening infection. Site appears similar to previous assessment on . Recommend continuation of Dakins dressings qshift. Wound team to continue following and assess progression and possible reapplication of wound vac prior to DC. May need veraflo if slough remains adhered.    : Pt's wounds have odor and appear worse than picture last taken at outpt wound clinic 3/24. There is yellow and brown/tan tissue to wound bed,  red edges.  Dakin's Solution® Quarter Strength is a broad-spectrum antimicrobial cleanser that is gentle to the skin. Effective against MRSA, VRE, other bacteria, viruses, molds, fungi and yeast. Also used for odor control. It helps debride chemically and mechanically with drsg removal.    There is a wound to her medial posterior ankle with scant bleeding. Unable to fully assess r/t  inability to raise leg to visualize wound. Applied heel mepilex and RN to apply heel float boot.     Goals: Steady decrease in non-viable tissue in wound bed weekly.    WOUND TEAM PLAN OF CARE ([X] for frequency of wound follow up,):   Nursing to follow orders written for wound care. Contact wound team if area fails to progress, deteriorates or with any questions/concerns  Dressing changes by wound team:                   Follow up 3 times weekly:                NPWT change 3 times weekly:     Follow up 1-2 times weekly:  x    Follow up Bi-Monthly:                   Follow up as needed:     Other (explain):     NURSING PLAN OF CARE ORDERS (X):  Dressing changes: See Dressing Care orders: x  Skin care: See Skin Care orders:   RN Prevention Protocol: x  Rectal tube care: See Rectal Tube Care orders:   Other orders:           Anticipated discharge plans:   LTACH:        SNF/Rehab:                  Home Health Care:           Outpatient Wound Center: referral placed           Self/Family Care:        Other:           Vac Discharge Needs:  Already has home machine in room   Not Applicable Pt not on a wound vac:   Not until wound bed more viable    Regular Vac while inpatient, alternative dressing at DC:        Regular Vac in use and continued at DC:         Reg. Vac w/ Skin Sub/Biologic in use. Will need to be changed 2x wkly:      Veraflo Vac while inpatient, ok to transition to Regular Vac on Discharge:           Veraflo Vac while inpatient, will need to remain on Veraflo Vac upon discharge:

## 2022-04-08 NOTE — PROGRESS NOTES
Received report from night shift RN. Assumed care of patient at 0715. Pt awake and alert. Assessment completed. VSS. Pt sleeping in bed Pt updated on plan of care for the day. All questions answered. No other needs at this time. Call light and belongings within reach, bed locked and in lowest position. Pt educated to call for assistance.

## 2022-04-08 NOTE — FACE TO FACE
Face to Face Supporting Documentation - Home Health    The encounter with this patient was in whole or in part the primary reason for home health admission.    Date of encounter:   Patient:                    MRN:                       YOB: 2022  Karine Ovalle  [id number]  1965     Home health to see patient for:  Skilled Nursing care for assessment, interventions & education, Physical Therapy evaluation and treatment and Occupational therapy evaluation and treatment    Skilled need for:  Exacerbation of Chronic Disease State cellulitis    Skilled nursing interventions to include:  Wound Care    Homebound status evidenced by:  Needs the assistance of another person in order to leave the home. Leaving home requires a considerable and taxing effort. There is a normal inability to leave the home.    Community Physician to provide follow up care: Micky Rubin M.D.     Optional Interventions? No      I certify the face to face encounter for this home health care referral meets the CMS requirements and the encounter/clinical assessment with the patient was, in whole, or in part, for the medical condition(s) listed above, which is the primary reason for home health care. Based on my clinical findings: the service(s) are medically necessary, support the need for home health care, and the homebound criteria are met.  I certify that this patient has had a face to face encounter by myself.  Efrain Braswell D.O. - NPI: [id number]

## 2022-04-08 NOTE — CARE PLAN
The patient is Stable - Low risk of patient condition declining or worsening    Shift Goals  Clinical Goals: Patient pain will be controlled this shift.  Patient Goals: Rest and omfort    Progress made toward(s) clinical / shift goals:  Patient has been given medication per order. Her pain only goes down to 8/10 after pain medication. She has been resting most of the shift.       Problem: Knowledge Deficit - Standard  Goal: Patient and family/care givers will demonstrate understanding of plan of care, disease process/condition, diagnostic tests and medications  Outcome: Progressing     Problem: Pain - Standard  Goal: Alleviation of pain or a reduction in pain to the patient’s comfort goal  Outcome: Progressing     Problem: Hemodynamics  Goal: Patient's hemodynamics, fluid balance and neurologic status will be stable or improve  Outcome: Progressing     Problem: Urinary - Renal Perfusion  Goal: Ability to achieve and maintain adequate renal perfusion and functioning will improve  Outcome: Progressing     Problem: Physical Regulation  Goal: Signs and symptoms of infection will decrease  Outcome: Progressing     Problem: Skin Integrity  Goal: Skin integrity is maintained or improved  Outcome: Progressing

## 2022-04-08 NOTE — THERAPY
"Speech Language Pathology  Daily Treatment     Patient Name: Karine Ovalle  Age:  56 y.o., Sex:  female  Medical Record #: [id number]  Today's Date: 4/8/2022     Precautions  Precautions: Fall Risk  Comments: B LE edema    Assessment    Pt seen on this date for dysphagia therapy. Initially tried to see pt earlier in morning, however, was in significant amount of pain. Re-educated pt on swallowing precautions (small bits/sips, up at 90* during meals, slow rate, chew adequately) and she verbalized understanding. No overt s/sx of aspiration appreciated with trials of her regular/thin liquid breakfast. Mastication and swallow trigger were timely and vocal quality was clear following the swallow. Pt reiterated that the only difficulty swallowing she has is when she eats quickly so discussed slowing down during meals and taking breaks as needed. At this time, recommend continuation of regular/thin liquid diet with implementation of strategies. No further acute SLP services recommended but please re-consult with any s/sx of aspiration or difficulty during meals.    Plan    1) continue regular/thin liquid diet with strategies  2) no further acute SLP needs but please re-consult with any s/sx of aspiration or difficulty    Continue current treatment plan.    Discharge Recommendations: (P) Anticipate that the patient will have no further speech therapy needs after discharge from the hospital         Objective       04/08/22 0916   Precautions   Precautions Fall Risk   Comments B LE edema   Vitals   O2 (LPM) 0   O2 Delivery Device None - Room Air   Pain 0 - 10 Group   Therapist Pain Assessment Post Activity Pain Same as Prior to Activity;Nurse Notified;0   Patient / Family Goals   Patient / Family Goal #1 \"Sometimes food gets stuck in my throat\"   Goal #1 Outcome Goal met   Short Term Goals   Short Term Goal # 1 Pt will consume a regular/thin liquid diet with no overt s/sx of aspiration   Goal Outcome # 1 Goal met "   Education Group   Education Provided Dysphagia   Dysphagia Patient Response Patient;Acceptance;Verbal Demonstration;Reinforcement Needed   Anticipated Discharge Needs   Discharge Recommendations Anticipate that the patient will have no further speech therapy needs after discharge from the hospital   Therapy Recommendations Upon DC Not Indicated   Interdisciplinary Plan of Care Collaboration   IDT Collaboration with  Nursing   Patient Position at End of Therapy Seated;In Bed;Call Light within Reach;Tray Table within Reach;Phone within Reach

## 2022-04-09 ENCOUNTER — APPOINTMENT (OUTPATIENT)
Dept: RADIOLOGY | Facility: MEDICAL CENTER | Age: 57
DRG: 871 | End: 2022-04-09
Attending: STUDENT IN AN ORGANIZED HEALTH CARE EDUCATION/TRAINING PROGRAM
Payer: COMMERCIAL

## 2022-04-09 LAB
ANION GAP SERPL CALC-SCNC: 6 MMOL/L (ref 7–16)
BASOPHILS # BLD AUTO: 0.5 % (ref 0–1.8)
BASOPHILS # BLD: 0.14 K/UL (ref 0–0.12)
BUN SERPL-MCNC: 9 MG/DL (ref 8–22)
CALCIUM SERPL-MCNC: 7.8 MG/DL (ref 8.4–10.2)
CHLORIDE SERPL-SCNC: 94 MMOL/L (ref 96–112)
CO2 SERPL-SCNC: 30 MMOL/L (ref 20–33)
CREAT SERPL-MCNC: 0.48 MG/DL (ref 0.5–1.4)
EOSINOPHIL # BLD AUTO: 0.07 K/UL (ref 0–0.51)
EOSINOPHIL NFR BLD: 0.2 % (ref 0–6.9)
ERYTHROCYTE [DISTWIDTH] IN BLOOD BY AUTOMATED COUNT: 40.6 FL (ref 35.9–50)
GFR SERPLBLD CREATININE-BSD FMLA CKD-EPI: 110 ML/MIN/1.73 M 2
GLUCOSE SERPL-MCNC: 143 MG/DL (ref 65–99)
HCT VFR BLD AUTO: 27.1 % (ref 37–47)
HGB BLD-MCNC: 9.4 G/DL (ref 12–16)
IMM GRANULOCYTES # BLD AUTO: 1.12 K/UL (ref 0–0.11)
IMM GRANULOCYTES NFR BLD AUTO: 3.8 % (ref 0–0.9)
LYMPHOCYTES # BLD AUTO: 2.56 K/UL (ref 1–4.8)
LYMPHOCYTES NFR BLD: 8.7 % (ref 22–41)
MCH RBC QN AUTO: 27.7 PG (ref 27–33)
MCHC RBC AUTO-ENTMCNC: 34.7 G/DL (ref 33.6–35)
MCV RBC AUTO: 79.9 FL (ref 81.4–97.8)
MONOCYTES # BLD AUTO: 1.31 K/UL (ref 0–0.85)
MONOCYTES NFR BLD AUTO: 4.4 % (ref 0–13.4)
NEUTROPHILS # BLD AUTO: 24.24 K/UL (ref 2–7.15)
NEUTROPHILS NFR BLD: 82.4 % (ref 44–72)
NRBC # BLD AUTO: 0 K/UL
NRBC BLD-RTO: 0 /100 WBC
PLATELET # BLD AUTO: 426 K/UL (ref 164–446)
PMV BLD AUTO: 9.5 FL (ref 9–12.9)
POTASSIUM SERPL-SCNC: 3.5 MMOL/L (ref 3.6–5.5)
PROCALCITONIN SERPL-MCNC: 0.94 NG/ML
RBC # BLD AUTO: 3.39 M/UL (ref 4.2–5.4)
SODIUM SERPL-SCNC: 130 MMOL/L (ref 135–145)
WBC # BLD AUTO: 29.4 K/UL (ref 4.8–10.8)

## 2022-04-09 PROCEDURE — 700102 HCHG RX REV CODE 250 W/ 637 OVERRIDE(OP): Performed by: INTERNAL MEDICINE

## 2022-04-09 PROCEDURE — 80048 BASIC METABOLIC PNL TOTAL CA: CPT

## 2022-04-09 PROCEDURE — A9270 NON-COVERED ITEM OR SERVICE: HCPCS | Performed by: STUDENT IN AN ORGANIZED HEALTH CARE EDUCATION/TRAINING PROGRAM

## 2022-04-09 PROCEDURE — 770006 HCHG ROOM/CARE - MED/SURG/GYN SEMI*

## 2022-04-09 PROCEDURE — 94640 AIRWAY INHALATION TREATMENT: CPT

## 2022-04-09 PROCEDURE — 73701 CT LOWER EXTREMITY W/DYE: CPT | Mod: RT

## 2022-04-09 PROCEDURE — 85025 COMPLETE CBC W/AUTO DIFF WBC: CPT

## 2022-04-09 PROCEDURE — 700102 HCHG RX REV CODE 250 W/ 637 OVERRIDE(OP): Performed by: HOSPITALIST

## 2022-04-09 PROCEDURE — A9270 NON-COVERED ITEM OR SERVICE: HCPCS | Performed by: HOSPITALIST

## 2022-04-09 PROCEDURE — 84145 PROCALCITONIN (PCT): CPT

## 2022-04-09 PROCEDURE — 94760 N-INVAS EAR/PLS OXIMETRY 1: CPT

## 2022-04-09 PROCEDURE — 700102 HCHG RX REV CODE 250 W/ 637 OVERRIDE(OP): Performed by: STUDENT IN AN ORGANIZED HEALTH CARE EDUCATION/TRAINING PROGRAM

## 2022-04-09 PROCEDURE — 99232 SBSQ HOSP IP/OBS MODERATE 35: CPT | Performed by: STUDENT IN AN ORGANIZED HEALTH CARE EDUCATION/TRAINING PROGRAM

## 2022-04-09 PROCEDURE — 700111 HCHG RX REV CODE 636 W/ 250 OVERRIDE (IP): Performed by: HOSPITALIST

## 2022-04-09 PROCEDURE — A9270 NON-COVERED ITEM OR SERVICE: HCPCS | Performed by: INTERNAL MEDICINE

## 2022-04-09 PROCEDURE — 700117 HCHG RX CONTRAST REV CODE 255: Performed by: STUDENT IN AN ORGANIZED HEALTH CARE EDUCATION/TRAINING PROGRAM

## 2022-04-09 PROCEDURE — 700111 HCHG RX REV CODE 636 W/ 250 OVERRIDE (IP): Performed by: INTERNAL MEDICINE

## 2022-04-09 RX ADMIN — SODIUM HYPOCHLORITE 1 ML: 1.25 SOLUTION TOPICAL at 05:44

## 2022-04-09 RX ADMIN — GABAPENTIN 600 MG: 300 CAPSULE ORAL at 05:44

## 2022-04-09 RX ADMIN — HYDROMORPHONE HYDROCHLORIDE 0.25 MG: 1 INJECTION, SOLUTION INTRAMUSCULAR; INTRAVENOUS; SUBCUTANEOUS at 04:02

## 2022-04-09 RX ADMIN — METHOCARBAMOL 500 MG: 500 TABLET ORAL at 05:44

## 2022-04-09 RX ADMIN — HEPARIN SODIUM 5000 UNITS: 5000 INJECTION INTRAVENOUS; SUBCUTANEOUS at 14:43

## 2022-04-09 RX ADMIN — GABAPENTIN 600 MG: 300 CAPSULE ORAL at 21:21

## 2022-04-09 RX ADMIN — OXYCODONE AND ACETAMINOPHEN 1 TABLET: 10; 325 TABLET ORAL at 21:43

## 2022-04-09 RX ADMIN — FUROSEMIDE 40 MG: 10 INJECTION, SOLUTION INTRAMUSCULAR; INTRAVENOUS at 16:11

## 2022-04-09 RX ADMIN — GABAPENTIN 600 MG: 300 CAPSULE ORAL at 12:29

## 2022-04-09 RX ADMIN — HYDROMORPHONE HYDROCHLORIDE 0.25 MG: 1 INJECTION, SOLUTION INTRAMUSCULAR; INTRAVENOUS; SUBCUTANEOUS at 04:30

## 2022-04-09 RX ADMIN — OXYCODONE AND ACETAMINOPHEN 1 TABLET: 10; 325 TABLET ORAL at 11:51

## 2022-04-09 RX ADMIN — HEPARIN SODIUM 5000 UNITS: 5000 INJECTION INTRAVENOUS; SUBCUTANEOUS at 21:22

## 2022-04-09 RX ADMIN — GABAPENTIN 600 MG: 300 CAPSULE ORAL at 17:53

## 2022-04-09 RX ADMIN — OXYCODONE AND ACETAMINOPHEN 1 TABLET: 10; 325 TABLET ORAL at 16:11

## 2022-04-09 RX ADMIN — SENNOSIDES AND DOCUSATE SODIUM 2 TABLET: 50; 8.6 TABLET ORAL at 05:44

## 2022-04-09 RX ADMIN — MORPHINE SULFATE 15 MG: 15 TABLET, FILM COATED, EXTENDED RELEASE ORAL at 05:44

## 2022-04-09 RX ADMIN — FLUTICASONE PROPIONATE 88 MCG: 44 AEROSOL, METERED RESPIRATORY (INHALATION) at 08:47

## 2022-04-09 RX ADMIN — IOHEXOL 100 ML: 350 INJECTION, SOLUTION INTRAVENOUS at 17:46

## 2022-04-09 RX ADMIN — METHOCARBAMOL 500 MG: 500 TABLET ORAL at 17:52

## 2022-04-09 RX ADMIN — HEPARIN SODIUM 5000 UNITS: 5000 INJECTION INTRAVENOUS; SUBCUTANEOUS at 05:45

## 2022-04-09 RX ADMIN — MORPHINE SULFATE 15 MG: 15 TABLET, FILM COATED, EXTENDED RELEASE ORAL at 17:53

## 2022-04-09 RX ADMIN — OXYCODONE AND ACETAMINOPHEN 1 TABLET: 10; 325 TABLET ORAL at 02:59

## 2022-04-09 RX ADMIN — SENNOSIDES AND DOCUSATE SODIUM 2 TABLET: 50; 8.6 TABLET ORAL at 17:53

## 2022-04-09 RX ADMIN — FLUTICASONE PROPIONATE 88 MCG: 44 AEROSOL, METERED RESPIRATORY (INHALATION) at 19:12

## 2022-04-09 RX ADMIN — SODIUM HYPOCHLORITE 1 ML: 1.25 SOLUTION TOPICAL at 18:33

## 2022-04-09 RX ADMIN — Medication 1 TABLET: at 05:46

## 2022-04-09 RX ADMIN — FUROSEMIDE 40 MG: 10 INJECTION, SOLUTION INTRAMUSCULAR; INTRAVENOUS at 05:45

## 2022-04-09 ASSESSMENT — PAIN DESCRIPTION - PAIN TYPE
TYPE: ACUTE PAIN

## 2022-04-09 ASSESSMENT — ENCOUNTER SYMPTOMS
HEADACHES: 0
CHILLS: 0
ABDOMINAL PAIN: 0
NAUSEA: 0
WHEEZING: 0
BACK PAIN: 1
SHORTNESS OF BREATH: 0
NECK PAIN: 0
CONSTIPATION: 0
DIARRHEA: 0
FEVER: 0
VOMITING: 0
MYALGIAS: 1

## 2022-04-09 NOTE — PROGRESS NOTES
Received bedside patient report from day shift RN. Patient resting in bed. Patient has pain in right leg. Patient refuses turns, patient educated. Questions and concerns addressed. Safety precautions in place.

## 2022-04-09 NOTE — PROGRESS NOTES
Hospital Medicine Daily Progress Note    Date of Service  4/9/2022    Chief Complaint  Karine Ovalle is a 56 y.o. female admitted 4/3/2022 with worsening wound    Hospital Course  Karine Ovalle is a 56 y.o. female with a past medical history of bilateral lower extremity lymphedema, mild intermittent asthma with chronic back pain who presented 4/3/2022 with worsening right lower extremity pain and swelling.  The patient states that she burned her anterior aspect of the right leg at the end of December.  Subsequently she has had an open wound followed by wound care.  She was seen by wound care on 24 March and I reviewed the note and the wound care nurse noted the patient was noncompliant did remove the dressing.  She did have an increase in interval size of her ulceration from prior.  She is not currently on antibiotics.  She states she has not had any associated fevers.  She states that her dog stepped on her leg and she had increased pain since that time.  She has had increased swelling as well.        Interval Problem Update  4/4 Patient is seen and examined at the bedside.  She is a drowsy, but is able to stay awake to answer questions.  AO x4.  She reported severe pain of RLE.  On Dilaudid as needed.    MRSA screen negative, discontinued vancomycin  Previous wound culture 2/2022 positive for Pseudomonas aeruginosa, MSSA, Morganella morganii, continue Zosyn  Bladder culture, wound culture pending  Leukocytosis with WBC 15, trending down to 47. Denies diarrhea, dysuria  Sodium 129  Leukocytosis resolved  CT RLE negative for abscess or osteomyelitis    4/5 Patient continues to be tachycardic spiked a fever this afternoon.  Discussed case with infectious disease who recommended to continue Zosyn 4.5 g for now and to wait for repeat cultures.  Discussed with wound care who recommended EVELYNE/arterial ultrasound, they do not think patient needs surgical debridement at this point. Patient's main complaint is  her swollen legs she reports last time she was admitted she was placed on 2 g sodium restriction diet and IV Lasix with significant improvement. Also noted that patient with significant coughing while trying to eat lunch, she reports she has had difficulty since she had neck surgery but does ok when she eats slow, SLP eval ordered.     4/6 Wound culture growing group A strep, E. Coli, morganella morganii, pending sensitivities. Discussed with ID for now continue zosyn. Persistent wbc 41. Consulted orthopedic surgery to see if wound need debridement given continued leukocytosis and fever yesterday, spoke with Dr. Kellogg did not think patient needed debridement. EVELYNE no indicative of significant stenosis, arterial US not performed. After discussion with ID and ortho likely something else causing leukocytosis, HIV and procal pending. May need oncology and possible bone marrow bx if does not improve, so far bcx negative. Patient continues to have severe pain in her leg, increased pain regimen dosing.    4/7  Patient appears to be pleasantly confused.  WBC down to 34, afebrile  Urine output insufficient on IV Lasix  SLP recommendations noted    4/8: Patient complaining of leg pain reevaluated the wound.  We will continue antibiotics.  White count is downtrending.  Her cultures have been growing E. coli, Morganella morganii and strep pyogenes.  Home health ordered.  Patient denies diarrhea do not suspect C. Difficile.    4/9: Patient continues to endorse leg pain and swelling.  White count is not improved since yesterday.  Will order repeat CT to evaluate.    I have personally seen and examined the patient at bedside. I discussed the plan of care with patient, bedside RN, charge RN,  and infectious disease and orthopedics.    Consultants/Specialty  Orthopedic surgery    Code Status  Full Code    Disposition  Patient is not medically cleared for discharge.   Anticipate discharge to to skilled nursing  facility.  I have placed the appropriate orders for post-discharge needs.    Review of Systems  Review of Systems   Constitutional: Positive for malaise/fatigue. Negative for chills and fever.   Respiratory: Negative for shortness of breath and wheezing.    Cardiovascular: Positive for leg swelling. Negative for chest pain.   Gastrointestinal: Negative for abdominal pain, constipation, diarrhea, nausea and vomiting.   Genitourinary: Negative for dysuria, frequency and urgency.   Musculoskeletal: Positive for back pain and myalgias. Negative for neck pain.   Neurological: Negative for headaches. Speech change:      Psychiatric/Behavioral: Suicidal ideas:      All other systems reviewed and are negative.       Physical Exam  Temp:  [36.4 °C (97.6 °F)-36.8 °C (98.3 °F)] 36.7 °C (98 °F)  Pulse:  [] 102  Resp:  [16-18] 18  BP: ()/(67-80) 98/67  SpO2:  [90 %-95 %] 92 %    Physical Exam  Vitals and nursing note reviewed.   Constitutional:       General: She is not in acute distress.     Appearance: She is obese. She is ill-appearing.   HENT:      Head: Normocephalic and atraumatic.   Eyes:      General: No scleral icterus.        Right eye: No discharge.         Left eye: No discharge.   Cardiovascular:      Rate and Rhythm: Normal rate and regular rhythm.      Heart sounds: Normal heart sounds.   Pulmonary:      Effort: Pulmonary effort is normal. No respiratory distress.      Breath sounds: Normal breath sounds.   Abdominal:      General: Bowel sounds are normal. There is no distension.      Palpations: Abdomen is soft.      Tenderness: There is no abdominal tenderness. There is no guarding.   Musculoskeletal:         General: Tenderness present.      Cervical back: Normal range of motion.      Right lower leg: Edema present.      Left lower leg: Edema present.      Comments: Right lower extremity erythema, swelling, tender, clean dressing in place   Skin:     General: Skin is warm and dry.      Coloration:  Skin is not jaundiced.   Neurological:      Mental Status: She is alert and oriented to person, place, and time. Mental status is at baseline.   Psychiatric:         Mood and Affect: Mood normal.         Thought Content: Thought content normal.         Fluids    Intake/Output Summary (Last 24 hours) at 4/9/2022 1311  Last data filed at 4/9/2022 1000  Gross per 24 hour   Intake 970 ml   Output 5300 ml   Net -4330 ml       Laboratory  Recent Labs     04/07/22 0230 04/08/22 0310 04/09/22  0519   WBC 34.9* 29.2* 29.4*   RBC 3.40* 3.46* 3.39*   HEMOGLOBIN 9.6* 9.5* 9.4*   HEMATOCRIT 27.1* 27.4* 27.1*   MCV 79.7* 79.2* 79.9*   MCH 28.2 27.5 27.7   MCHC 35.4* 34.7 34.7   RDW 39.9 40.0 40.6   PLATELETCT 311 387 426   MPV 9.6 9.8 9.5     Recent Labs     04/07/22 0230 04/08/22 0310 04/09/22  0519   SODIUM 131* 128* 130*   POTASSIUM 3.8 4.0 3.5*   CHLORIDE 98 92* 94*   CO2 25 30 30   GLUCOSE 102* 92 143*   BUN 9 8 9   CREATININE 0.47* 0.47* 0.48*   CALCIUM 7.9* 7.9* 7.8*                   Imaging  US-EVELYNE SINGLE LEVEL UNILAT RIGHT   Final Result      DX-CHEST-PORTABLE (1 VIEW)   Final Result         1.  No acute cardiopulmonary disease.      CT-EXTREMITY, LOWER WITH RIGHT   Final Result      1.  There is subcutaneous edema in the right lower leg with a large anterior lower leg ulcer/wound defect.   2.  There is no subcutaneous fluid collection to suggest abscess.   3.  There is no CT evidence of osteomyelitis.      DX-TIBIA AND FIBULA RIGHT   Final Result      1.  Large ulcer/wound in the anterior mid right leg.   2.  No acute osseous abnormality.      US-EXTREMITY VENOUS LOWER UNILAT RIGHT   Final Result      1.  No evidence of Right  lower extremity deep venous thrombosis.      2.  Exam limited due to extensive subcutaneous edema.      CT-EXTREMITY, LOWER WITH RIGHT    (Results Pending)        Assessment/Plan  * Sepsis (HCC)- (present on admission)  Assessment & Plan  This is Sepsis Present on admission  SIRS criteria  identified on my evaluation include: Tachycardia, with heart rate greater than 90 BPM and Leukocytosis, with WBC greater than 12,000  Source is cellulitis  Sepsis protocol initiated  Fluid resuscitation ordered per protocol  Crystalloid Fluid Administration: Fluid resuscitation ordered per standard protocol - 30 mL/kg per current or ideal body weight  IV antibiotics as appropriate for source of sepsis  Reassessment: I have reassessed the patient's hemodynamic status    Resolved    Open wound of right lower extremity  Assessment & Plan  she burned her anterior aspect of the right leg at the end of December.  Subsequently she has had an open wound followed by wound care.   Noticed worsening wound per wound care  Previous wound culture 2/2022 positive for Pseudomonas aeruginosa, MSSA, Morganella morganii     Continue Zosyn  wound care consulted  Discussed with ID, continue zosyn for now, f/u wound cx  Ortho consulted, no need for surgical debridement     Leukocytosis- (present on admission)  Assessment & Plan  Marked leukocytosis.  Likely secondary to cellulitis and wound infection  Per ID and ortho likely wound not causing significant leukocytosis   Blood culture, wound culture pending  HIV and procal pending   Consider oncology consult if not improving    CT scan of the right lower extremity negative for abscess and osteomyelitis  Continue Zosyn    4/8 white count is downtrending.  Procalcitonin continues to be elevated.  Wound reevaluated does not appear to have underlying abscess.  For now continue antibiotics and trend WBC.     4/9: WBC persistently elevated, repeat CT leg ordered    Cellulitis- (present on admission)  Assessment & Plan  Started on Zosyn and vancomycin in the emergency room  MRSA screen negative, discontinue Vanco  Wound culture positive for E Coli, M Morganii, Strep Pyogenes  Continue Zosyn  Repeat CT leg        Leg edema- (present on admission)  Assessment & Plan  R>L  Start IV lasix 40 mg    Diet- 2 g sodium restriction  Venous US negative for DVT    Edema- (present on admission)  Assessment & Plan  Extensive edema of the right lower extremity, will check CT scan to rule out underlying abscess  Consider restarting home diuretics when blood pressure is more stable    Morbid obesity with BMI of 40.0-44.9, adult (HCC)- (present on admission)  Assessment & Plan  BMI 44    Essential hypertension- (present on admission)  Assessment & Plan  Not currently on scheduled medications.  I will start as needed hydralazine labetalol for extreme hypertension    Hyponatremia- (present on admission)  Assessment & Plan  Hypovolemic hyponatremia  I expect Na to improve with IVF        VTE prophylaxis: SCDs/TEDs and heparin ppx    I have performed a physical exam and reviewed and updated ROS and Plan today (4/9/2022). In review of yesterday's note (4/8/2022), there are no changes except as documented above.

## 2022-04-09 NOTE — CARE PLAN
The patient is Stable - Low risk of patient condition declining or worsening    Shift Goals  Clinical Goals: Pain control  Patient Goals: Pain control    Progress made toward(s) clinical / shift goals:  Pain reduced with use of PRN medications.     Problem: Pain - Standard  Goal: Alleviation of pain or a reduction in pain to the patient’s comfort goal  Outcome: Progressing     Problem: Knowledge Deficit - Standard  Goal: Patient and family/care givers will demonstrate understanding of plan of care, disease process/condition, diagnostic tests and medications  Outcome: Progressing     Problem: Fall Risk  Goal: Patient will remain free from falls  Outcome: Progressing       Patient is not progressing towards the following goals:

## 2022-04-09 NOTE — CARE PLAN
The patient is Stable - Low risk of patient condition declining or worsening    Shift Goals  Clinical Goals: Pain control 5/10 this shift  Patient Goals: Pain control, rest    Progress made toward(s) clinical / shift goals:  Patient medicated per MAR. Patient verbalizes understanding of care. Safety measures in place.      Patient is not progressing towards the following goals: N/A      Problem: Pain - Standard  Goal: Alleviation of pain or a reduction in pain to the patient’s comfort goal  Outcome: Progressing     Problem: Knowledge Deficit - Standard  Goal: Patient and family/care givers will demonstrate understanding of plan of care, disease process/condition, diagnostic tests and medications  Outcome: Progressing     Problem: Fall Risk  Goal: Patient will remain free from falls  Outcome: Progressing     Problem: Skin Integrity  Goal: Skin integrity is maintained or improved  Outcome: Progressing

## 2022-04-09 NOTE — PROGRESS NOTES
0710 Received report from night RN, assumed care. POC discussed.   A&Ox4, reported 8/10 pain. At 0830 pt declined pain medication and want to sleep. in  Bed in lowest position with call light and belongings within reach.

## 2022-04-10 LAB
ANION GAP SERPL CALC-SCNC: 7 MMOL/L (ref 7–16)
BASOPHILS # BLD AUTO: 0.4 % (ref 0–1.8)
BASOPHILS # BLD: 0.11 K/UL (ref 0–0.12)
BUN SERPL-MCNC: 9 MG/DL (ref 8–22)
CALCIUM SERPL-MCNC: 7.9 MG/DL (ref 8.4–10.2)
CHLORIDE SERPL-SCNC: 93 MMOL/L (ref 96–112)
CO2 SERPL-SCNC: 30 MMOL/L (ref 20–33)
CREAT SERPL-MCNC: 0.46 MG/DL (ref 0.5–1.4)
EOSINOPHIL # BLD AUTO: 0.05 K/UL (ref 0–0.51)
EOSINOPHIL NFR BLD: 0.2 % (ref 0–6.9)
ERYTHROCYTE [DISTWIDTH] IN BLOOD BY AUTOMATED COUNT: 40.1 FL (ref 35.9–50)
GFR SERPLBLD CREATININE-BSD FMLA CKD-EPI: 112 ML/MIN/1.73 M 2
GLUCOSE SERPL-MCNC: 118 MG/DL (ref 65–99)
HCT VFR BLD AUTO: 26.6 % (ref 37–47)
HGB BLD-MCNC: 9.1 G/DL (ref 12–16)
IMM GRANULOCYTES # BLD AUTO: 0.55 K/UL (ref 0–0.11)
IMM GRANULOCYTES NFR BLD AUTO: 2.1 % (ref 0–0.9)
LYMPHOCYTES # BLD AUTO: 2.31 K/UL (ref 1–4.8)
LYMPHOCYTES NFR BLD: 9 % (ref 22–41)
MCH RBC QN AUTO: 27.2 PG (ref 27–33)
MCHC RBC AUTO-ENTMCNC: 34.2 G/DL (ref 33.6–35)
MCV RBC AUTO: 79.4 FL (ref 81.4–97.8)
MONOCYTES # BLD AUTO: 1.11 K/UL (ref 0–0.85)
MONOCYTES NFR BLD AUTO: 4.3 % (ref 0–13.4)
NEUTROPHILS # BLD AUTO: 21.61 K/UL (ref 2–7.15)
NEUTROPHILS NFR BLD: 84 % (ref 44–72)
NRBC # BLD AUTO: 0 K/UL
NRBC BLD-RTO: 0 /100 WBC
PLATELET # BLD AUTO: 473 K/UL (ref 164–446)
PMV BLD AUTO: 9.5 FL (ref 9–12.9)
POTASSIUM SERPL-SCNC: 3.6 MMOL/L (ref 3.6–5.5)
RBC # BLD AUTO: 3.35 M/UL (ref 4.2–5.4)
SODIUM SERPL-SCNC: 130 MMOL/L (ref 135–145)
WBC # BLD AUTO: 25.7 K/UL (ref 4.8–10.8)

## 2022-04-10 PROCEDURE — 97530 THERAPEUTIC ACTIVITIES: CPT

## 2022-04-10 PROCEDURE — 97110 THERAPEUTIC EXERCISES: CPT

## 2022-04-10 PROCEDURE — 700111 HCHG RX REV CODE 636 W/ 250 OVERRIDE (IP): Performed by: INTERNAL MEDICINE

## 2022-04-10 PROCEDURE — 99232 SBSQ HOSP IP/OBS MODERATE 35: CPT | Performed by: STUDENT IN AN ORGANIZED HEALTH CARE EDUCATION/TRAINING PROGRAM

## 2022-04-10 PROCEDURE — 80048 BASIC METABOLIC PNL TOTAL CA: CPT

## 2022-04-10 PROCEDURE — 700102 HCHG RX REV CODE 250 W/ 637 OVERRIDE(OP): Performed by: HOSPITALIST

## 2022-04-10 PROCEDURE — 94640 AIRWAY INHALATION TREATMENT: CPT

## 2022-04-10 PROCEDURE — 770006 HCHG ROOM/CARE - MED/SURG/GYN SEMI*

## 2022-04-10 PROCEDURE — A9270 NON-COVERED ITEM OR SERVICE: HCPCS | Performed by: INTERNAL MEDICINE

## 2022-04-10 PROCEDURE — 94760 N-INVAS EAR/PLS OXIMETRY 1: CPT

## 2022-04-10 PROCEDURE — A9270 NON-COVERED ITEM OR SERVICE: HCPCS | Performed by: STUDENT IN AN ORGANIZED HEALTH CARE EDUCATION/TRAINING PROGRAM

## 2022-04-10 PROCEDURE — 85025 COMPLETE CBC W/AUTO DIFF WBC: CPT

## 2022-04-10 PROCEDURE — 97535 SELF CARE MNGMENT TRAINING: CPT

## 2022-04-10 PROCEDURE — A9270 NON-COVERED ITEM OR SERVICE: HCPCS | Performed by: HOSPITALIST

## 2022-04-10 PROCEDURE — 700102 HCHG RX REV CODE 250 W/ 637 OVERRIDE(OP): Performed by: STUDENT IN AN ORGANIZED HEALTH CARE EDUCATION/TRAINING PROGRAM

## 2022-04-10 PROCEDURE — 700102 HCHG RX REV CODE 250 W/ 637 OVERRIDE(OP): Performed by: INTERNAL MEDICINE

## 2022-04-10 PROCEDURE — 700111 HCHG RX REV CODE 636 W/ 250 OVERRIDE (IP): Performed by: HOSPITALIST

## 2022-04-10 RX ADMIN — GABAPENTIN 600 MG: 300 CAPSULE ORAL at 17:05

## 2022-04-10 RX ADMIN — FLUTICASONE PROPIONATE 88 MCG: 44 AEROSOL, METERED RESPIRATORY (INHALATION) at 20:07

## 2022-04-10 RX ADMIN — HYDROMORPHONE HYDROCHLORIDE 0.25 MG: 1 INJECTION, SOLUTION INTRAMUSCULAR; INTRAVENOUS; SUBCUTANEOUS at 15:00

## 2022-04-10 RX ADMIN — FUROSEMIDE 40 MG: 10 INJECTION, SOLUTION INTRAMUSCULAR; INTRAVENOUS at 16:11

## 2022-04-10 RX ADMIN — GABAPENTIN 600 MG: 300 CAPSULE ORAL at 05:57

## 2022-04-10 RX ADMIN — SODIUM HYPOCHLORITE 1 ML: 1.25 SOLUTION TOPICAL at 06:36

## 2022-04-10 RX ADMIN — MORPHINE SULFATE 15 MG: 15 TABLET, FILM COATED, EXTENDED RELEASE ORAL at 05:57

## 2022-04-10 RX ADMIN — OXYCODONE AND ACETAMINOPHEN 1 TABLET: 10; 325 TABLET ORAL at 07:58

## 2022-04-10 RX ADMIN — METHOCARBAMOL 500 MG: 500 TABLET ORAL at 05:57

## 2022-04-10 RX ADMIN — FLUTICASONE PROPIONATE 88 MCG: 44 AEROSOL, METERED RESPIRATORY (INHALATION) at 07:19

## 2022-04-10 RX ADMIN — OXYCODONE AND ACETAMINOPHEN 1 TABLET: 10; 325 TABLET ORAL at 01:57

## 2022-04-10 RX ADMIN — SENNOSIDES AND DOCUSATE SODIUM 2 TABLET: 50; 8.6 TABLET ORAL at 05:58

## 2022-04-10 RX ADMIN — HEPARIN SODIUM 5000 UNITS: 5000 INJECTION INTRAVENOUS; SUBCUTANEOUS at 22:00

## 2022-04-10 RX ADMIN — Medication 1 TABLET: at 05:58

## 2022-04-10 RX ADMIN — SODIUM HYPOCHLORITE 1 ML: 1.25 SOLUTION TOPICAL at 17:06

## 2022-04-10 RX ADMIN — HYDROMORPHONE HYDROCHLORIDE 0.25 MG: 1 INJECTION, SOLUTION INTRAMUSCULAR; INTRAVENOUS; SUBCUTANEOUS at 06:52

## 2022-04-10 RX ADMIN — OXYCODONE AND ACETAMINOPHEN 1 TABLET: 10; 325 TABLET ORAL at 19:27

## 2022-04-10 RX ADMIN — METHOCARBAMOL 500 MG: 500 TABLET ORAL at 17:05

## 2022-04-10 RX ADMIN — FUROSEMIDE 40 MG: 10 INJECTION, SOLUTION INTRAMUSCULAR; INTRAVENOUS at 05:58

## 2022-04-10 RX ADMIN — GABAPENTIN 600 MG: 300 CAPSULE ORAL at 11:47

## 2022-04-10 RX ADMIN — HEPARIN SODIUM 5000 UNITS: 5000 INJECTION INTRAVENOUS; SUBCUTANEOUS at 05:58

## 2022-04-10 RX ADMIN — OXYCODONE AND ACETAMINOPHEN 1 TABLET: 10; 325 TABLET ORAL at 13:54

## 2022-04-10 RX ADMIN — GABAPENTIN 600 MG: 300 CAPSULE ORAL at 22:00

## 2022-04-10 RX ADMIN — HEPARIN SODIUM 5000 UNITS: 5000 INJECTION INTRAVENOUS; SUBCUTANEOUS at 13:52

## 2022-04-10 RX ADMIN — MORPHINE SULFATE 15 MG: 15 TABLET, FILM COATED, EXTENDED RELEASE ORAL at 17:05

## 2022-04-10 ASSESSMENT — COGNITIVE AND FUNCTIONAL STATUS - GENERAL
DRESSING REGULAR LOWER BODY CLOTHING: TOTAL
SUGGESTED CMS G CODE MODIFIER MOBILITY: CL
SUGGESTED CMS G CODE MODIFIER DAILY ACTIVITY: CL
MOVING TO AND FROM BED TO CHAIR: A LITTLE
MOBILITY SCORE: 11
PERSONAL GROOMING: A LITTLE
TOILETING: TOTAL
CLIMB 3 TO 5 STEPS WITH RAILING: TOTAL
STANDING UP FROM CHAIR USING ARMS: A LOT
DAILY ACTIVITIY SCORE: 13
DRESSING REGULAR UPPER BODY CLOTHING: A LITTLE
HELP NEEDED FOR BATHING: TOTAL
WALKING IN HOSPITAL ROOM: TOTAL
MOVING FROM LYING ON BACK TO SITTING ON SIDE OF FLAT BED: A LOT
TURNING FROM BACK TO SIDE WHILE IN FLAT BAD: A LOT

## 2022-04-10 ASSESSMENT — ENCOUNTER SYMPTOMS
BACK PAIN: 1
SHORTNESS OF BREATH: 0
VOMITING: 0
MYALGIAS: 1
CONSTIPATION: 0
DIARRHEA: 0
NAUSEA: 0
CHILLS: 0
FEVER: 0
HEADACHES: 0
ABDOMINAL PAIN: 0
WHEEZING: 0
NECK PAIN: 0

## 2022-04-10 ASSESSMENT — PAIN DESCRIPTION - PAIN TYPE
TYPE: CHRONIC PAIN;ACUTE PAIN
TYPE: ACUTE PAIN
TYPE: ACUTE PAIN;CHRONIC PAIN
TYPE: CHRONIC PAIN
TYPE: ACUTE PAIN;CHRONIC PAIN
TYPE: CHRONIC PAIN
TYPE: ACUTE PAIN;CHRONIC PAIN
TYPE: ACUTE PAIN;CHRONIC PAIN

## 2022-04-10 ASSESSMENT — GAIT ASSESSMENTS: GAIT LEVEL OF ASSIST: UNABLE TO PARTICIPATE

## 2022-04-10 NOTE — PROGRESS NOTES
Hospital Medicine Daily Progress Note    Date of Service  4/10/2022    Chief Complaint  Karine Ovalle is a 56 y.o. female admitted 4/3/2022 with worsening wound    Hospital Course  Karine Ovalle is a 56 y.o. female with a past medical history of bilateral lower extremity lymphedema, mild intermittent asthma with chronic back pain who presented 4/3/2022 with worsening right lower extremity pain and swelling.  The patient states that she burned her anterior aspect of the right leg at the end of December.  Subsequently she has had an open wound followed by wound care.  She was seen by wound care on 24 March and I reviewed the note and the wound care nurse noted the patient was noncompliant did remove the dressing.  She did have an increase in interval size of her ulceration from prior.  She is not currently on antibiotics.  She states she has not had any associated fevers.  She states that her dog stepped on her leg and she had increased pain since that time.  She has had increased swelling as well.        Interval Problem Update  4/4 Patient is seen and examined at the bedside.  She is a drowsy, but is able to stay awake to answer questions.  AO x4.  She reported severe pain of RLE.  On Dilaudid as needed.    MRSA screen negative, discontinued vancomycin  Previous wound culture 2/2022 positive for Pseudomonas aeruginosa, MSSA, Morganella morganii, continue Zosyn  Bladder culture, wound culture pending  Leukocytosis with WBC 15, trending down to 47. Denies diarrhea, dysuria  Sodium 129  Leukocytosis resolved  CT RLE negative for abscess or osteomyelitis    4/5 Patient continues to be tachycardic spiked a fever this afternoon.  Discussed case with infectious disease who recommended to continue Zosyn 4.5 g for now and to wait for repeat cultures.  Discussed with wound care who recommended EVELYNE/arterial ultrasound, they do not think patient needs surgical debridement at this point. Patient's main complaint is  her swollen legs she reports last time she was admitted she was placed on 2 g sodium restriction diet and IV Lasix with significant improvement. Also noted that patient with significant coughing while trying to eat lunch, she reports she has had difficulty since she had neck surgery but does ok when she eats slow, SLP eval ordered.     4/6 Wound culture growing group A strep, E. Coli, morganella morganii, pending sensitivities. Discussed with ID for now continue zosyn. Persistent wbc 41. Consulted orthopedic surgery to see if wound need debridement given continued leukocytosis and fever yesterday, spoke with Dr. Kellogg did not think patient needed debridement. EVELYNE no indicative of significant stenosis, arterial US not performed. After discussion with ID and ortho likely something else causing leukocytosis, HIV and procal pending. May need oncology and possible bone marrow bx if does not improve, so far bcx negative. Patient continues to have severe pain in her leg, increased pain regimen dosing.    4/7  Patient appears to be pleasantly confused.  WBC down to 34, afebrile  Urine output insufficient on IV Lasix  SLP recommendations noted    4/8: Patient complaining of leg pain reevaluated the wound.  We will continue antibiotics.  White count is downtrending.  Her cultures have been growing E. coli, Morganella morganii and strep pyogenes.  Home health ordered.  Patient denies diarrhea do not suspect C. Difficile.    4/9: Patient continues to endorse leg pain and swelling.  White count is not improved since yesterday.  Will order repeat CT to evaluate.    4/10: Patient continues to endorse leg pain.  CT yesterday did not show an abscess and appears unchanged from the prior.  Patient does not participate much with physical therapy, she says due to pain.  White count decreased off of antibiotics, will maintain patient off antibiotics at this time.  Trend CBC.    I have personally seen and examined the patient at  bedside. I discussed the plan of care with patient, bedside RN, charge RN,  and infectious disease and orthopedics.    Consultants/Specialty  Orthopedic surgery    Code Status  Full Code    Disposition  Patient is not medically cleared for discharge.   Anticipate discharge to to skilled nursing facility.  I have placed the appropriate orders for post-discharge needs.    Review of Systems  Review of Systems   Constitutional: Positive for malaise/fatigue. Negative for chills and fever.   Respiratory: Negative for shortness of breath and wheezing.    Cardiovascular: Positive for leg swelling. Negative for chest pain.   Gastrointestinal: Negative for abdominal pain, constipation, diarrhea, nausea and vomiting.   Genitourinary: Negative for dysuria, frequency and urgency.   Musculoskeletal: Positive for back pain and myalgias. Negative for neck pain.   Neurological: Negative for headaches. Speech change:      Psychiatric/Behavioral: Suicidal ideas:      All other systems reviewed and are negative.       Physical Exam  Temp:  [36.6 °C (97.8 °F)-37.3 °C (99.2 °F)] 36.6 °C (97.8 °F)  Pulse:  [102-110] 110  Resp:  [15-20] 15  BP: ()/(57-88) 106/61  SpO2:  [90 %-98 %] 90 %    Physical Exam  Vitals and nursing note reviewed.   Constitutional:       General: She is not in acute distress.     Appearance: She is obese. She is ill-appearing.   HENT:      Head: Normocephalic and atraumatic.   Eyes:      General: No scleral icterus.        Right eye: No discharge.         Left eye: No discharge.   Cardiovascular:      Rate and Rhythm: Normal rate and regular rhythm.      Heart sounds: Normal heart sounds.   Pulmonary:      Effort: Pulmonary effort is normal. No respiratory distress.      Breath sounds: Normal breath sounds.   Abdominal:      General: Bowel sounds are normal. There is no distension.      Palpations: Abdomen is soft.      Tenderness: There is no abdominal tenderness. There is no guarding.    Musculoskeletal:         General: Tenderness present.      Cervical back: Normal range of motion.      Right lower leg: Edema present.      Left lower leg: Edema present.      Comments: Right lower extremity erythema, swelling, tender, clean dressing in place   Skin:     General: Skin is warm and dry.      Coloration: Skin is not jaundiced.   Neurological:      Mental Status: She is alert and oriented to person, place, and time. Mental status is at baseline.   Psychiatric:         Mood and Affect: Mood normal.         Thought Content: Thought content normal.         Fluids    Intake/Output Summary (Last 24 hours) at 4/10/2022 1142  Last data filed at 4/10/2022 0858  Gross per 24 hour   Intake 1140 ml   Output 2500 ml   Net -1360 ml       Laboratory  Recent Labs     04/08/22  0310 04/09/22  0519 04/10/22  0309   WBC 29.2* 29.4* 25.7*   RBC 3.46* 3.39* 3.35*   HEMOGLOBIN 9.5* 9.4* 9.1*   HEMATOCRIT 27.4* 27.1* 26.6*   MCV 79.2* 79.9* 79.4*   MCH 27.5 27.7 27.2   MCHC 34.7 34.7 34.2   RDW 40.0 40.6 40.1   PLATELETCT 387 426 473*   MPV 9.8 9.5 9.5     Recent Labs     04/08/22  0310 04/09/22  0519 04/10/22  0309   SODIUM 128* 130* 130*   POTASSIUM 4.0 3.5* 3.6   CHLORIDE 92* 94* 93*   CO2 30 30 30   GLUCOSE 92 143* 118*   BUN 8 9 9   CREATININE 0.47* 0.48* 0.46*   CALCIUM 7.9* 7.8* 7.9*                   Imaging  CT-EXTREMITY, LOWER WITH RIGHT   Final Result      1.  Negative for abscess      2.  Diffuse cutaneous/subcutaneous edema of the right distal thigh and leg      3.  Presence of right knee arthroplasty      4.  Apparent open wound anteromedial leg      US-EVELYNE SINGLE LEVEL UNILAT RIGHT   Final Result      DX-CHEST-PORTABLE (1 VIEW)   Final Result         1.  No acute cardiopulmonary disease.      CT-EXTREMITY, LOWER WITH RIGHT   Final Result      1.  There is subcutaneous edema in the right lower leg with a large anterior lower leg ulcer/wound defect.   2.  There is no subcutaneous fluid collection to suggest  abscess.   3.  There is no CT evidence of osteomyelitis.      DX-TIBIA AND FIBULA RIGHT   Final Result      1.  Large ulcer/wound in the anterior mid right leg.   2.  No acute osseous abnormality.      US-EXTREMITY VENOUS LOWER UNILAT RIGHT   Final Result      1.  No evidence of Right  lower extremity deep venous thrombosis.      2.  Exam limited due to extensive subcutaneous edema.           Assessment/Plan  * Sepsis (HCC)- (present on admission)  Assessment & Plan  This is Sepsis Present on admission  SIRS criteria identified on my evaluation include: Tachycardia, with heart rate greater than 90 BPM and Leukocytosis, with WBC greater than 12,000  Source is cellulitis  Sepsis protocol initiated  Fluid resuscitation ordered per protocol  Crystalloid Fluid Administration: Fluid resuscitation ordered per standard protocol - 30 mL/kg per current or ideal body weight  IV antibiotics as appropriate for source of sepsis  Reassessment: I have reassessed the patient's hemodynamic status    Resolved    Open wound of right lower extremity  Assessment & Plan  she burned her anterior aspect of the right leg at the end of December.  Subsequently she has had an open wound followed by wound care.   Noticed worsening wound per wound care  Previous wound culture 2/2022 positive for Pseudomonas aeruginosa, MSSA, Morganella morganii     Continue Zosyn  wound care consulted  Discussed with ID, continue zosyn for now, f/u wound cx  Ortho consulted, no need for surgical debridement     Leukocytosis- (present on admission)  Assessment & Plan  Marked leukocytosis.  Likely secondary to cellulitis and wound infection  Per ID and ortho likely wound not causing significant leukocytosis   Blood culture, wound culture pending  HIV and procal pending   Consider oncology consult if not improving    CT scan of the right lower extremity negative for abscess and osteomyelitis  Continue Zosyn    4/8 white count is downtrending.  Procalcitonin  continues to be elevated.  Wound reevaluated does not appear to have underlying abscess.  For now continue antibiotics and trend WBC.     4/10: WBC improving off antibiotics.  Repeat CT did not show an abscess    Cellulitis- (present on admission)  Assessment & Plan  Started on Zosyn and vancomycin in the emergency room  MRSA screen negative, discontinue Vanco  Wound culture positive for E Coli, M Morganii, Strep Pyogenes  Continue Zosyn  Repeat CT leg        Leg edema- (present on admission)  Assessment & Plan  R>L  Start IV lasix 40 mg   Diet- 2 g sodium restriction  Venous US negative for DVT    Edema- (present on admission)  Assessment & Plan  Extensive edema of the right lower extremity, will check CT scan to rule out underlying abscess  Consider restarting home diuretics when blood pressure is more stable    Morbid obesity with BMI of 40.0-44.9, adult (HCC)- (present on admission)  Assessment & Plan  BMI 44    Essential hypertension- (present on admission)  Assessment & Plan  Not currently on scheduled medications.  I will start as needed hydralazine labetalol for extreme hypertension    Hyponatremia- (present on admission)  Assessment & Plan  Hypovolemic hyponatremia  I expect Na to improve with IVF        VTE prophylaxis: SCDs/TEDs and heparin ppx    I have performed a physical exam and reviewed and updated ROS and Plan today (4/10/2022). In review of yesterday's note (4/9/2022), there are no changes except as documented above.

## 2022-04-10 NOTE — CARE PLAN
Problem: Pain - Standard  Goal: Alleviation of pain or a reduction in pain to the patient’s comfort goal  Outcome: Progressing     Problem: Knowledge Deficit - Standard  Goal: Patient and family/care givers will demonstrate understanding of plan of care, disease process/condition, diagnostic tests and medications  Outcome: Progressing     Problem: Respiratory  Goal: Patient will achieve/maintain optimum respiratory ventilation and gas exchange  Outcome: Progressing     Problem: Physical Regulation  Goal: Diagnostic test results will improve  Outcome: Progressing     Problem: Skin Integrity  Goal: Skin integrity is maintained or improved  Outcome: Progressing   The patient is Stable - Low risk of patient condition declining or worsening    Shift Goals  Clinical Goals: Pt pain will be less than a 5/10 by the end of the shift  Patient Goals: rest comefortably    Progress made toward(s) clinical / shift goals:  all goals     Patient is not progressing towards the following goals:

## 2022-04-10 NOTE — THERAPY
Physical Therapy   Daily Treatment     Patient Name: Karine Ovalle  Age:  56 y.o., Sex:  female  Medical Record #: [id number]  Today's Date: 4/10/2022     Precautions  Precautions: (P) Fall Risk    Assessment    Pt is self limiting and requires max encouragement to participate. Very slow to move and limited by R LE pain. Pt premedicated prior to tx. Pt able to sit EOB x 15 mins and refed to try standing. Pt adamend about not going to SNF and wants to go home. States S. O. Can help her using a leg . Pt at this pint does not demo enough mobility to safety return home with S.O. recommend post acute  PT services. Will follow during acute care.      Plan    Continue current treatment plan.    DC Equipment Recommendations: (P) Unable to determine at this time  Discharge Recommendations: (P) Recommend post-acute placement for additional physical therapy services prior to discharge home         04/10/22 1155   Total Time Spent   Total Time Spent (Mins) 45   Charge Group   PT Therapeutic Activities 1   PT Therapeutic Exercise 1   PT Self Care / Home Evaluation  1   Precautions   Precautions Fall Risk   Vitals   O2 Delivery Device None - Room Air   Pain 0 - 10 Group   Location Leg   Location Orientation Right   Therapist Pain Assessment 7;Nurse Notified;Post Activity Pain Same as Prior to Activity  (pre med with pn med prior to tx)   Cognition    Cognition / Consciousness X   Level of Consciousness Alert   Attention Impaired   Comments pt focused on pain during tx   Passive ROM Lower Body   Passive ROM Lower Body WDL   Active ROM Lower Body    Active ROM Lower Body  X   Comments RLE limited due to pain.   Strength Lower Body   Lower Body Strength  X   Comments R > L LE pain. Pt having difficulty moving R LE in bed due to pain, heaviness   Sensation Lower Body   Lower Extremity Sensation   X   Comments c/o of burnign R med/lateral leg   Lower Body Muscle Tone   Lower Body Muscle Tone  WDL   Supine Lower Body Exercise    Supine Lower Body Exercises Yes   Heel Slide 1 set of 10   Ankle Pumps Reciprocal;1 set of 10   Gluteal Isometrics 1 set of 10   Quadriceps Isometrics 1 set of 10   Home Exercise Program   Comments breathing ex for pain   Vision   Vision Comments denies   Neurological Concerns   Neurological Concerns No   Balance   Sitting Balance (Static) Fair   Sitting Balance (Dynamic) Fair -   Standing Balance (Static)   (did not attempt)   Weight Shift Sitting Poor   Skilled Intervention Verbal Cuing;Sequencing   Gait Analysis   Gait Level Of Assist Unable to Participate   Bed Mobility    Supine to Sit Minimal Assist   Sit to Supine Minimal Assist   Skilled Intervention Verbal Cuing;Tactile Cuing;Sequencing   Comments HOB elevated 30 degrees, use of rail. constant VC's for technique, assist to lift RLE off and on bed   Functional Mobility   Sit to Stand Unable to Participate   Bed, Chair, Wheelchair Transfer Unable to Participate   How much difficulty does the patient currently have...   Turning over in bed (including adjusting bedclothes, sheets and blankets)? 2   Sitting down on and standing up from a chair with arms (e.g., wheelchair, bedside commode, etc.) 2   Moving from lying on back to sitting on the side of the bed? 3   How much help from another person does the patient currently need...   Moving to and from a bed to a chair (including a wheelchair)? 2   Need to walk in a hospital room? 1   Climbing 3-5 steps with a railing? 1   6 clicks Mobility Score 11   Activity Tolerance   Sitting in Chair unable   Sitting Edge of Bed 15 mins   Standing unable due to pain   Comments limited by pain and weakness   Short Term Goals    Short Term Goal # 1 pt will go supine<>sit w/hob elevated and rails up w/Min A in 6tx   Goal Outcome # 1 Progressing slower than expected   Short Term Goal # 2 pt will go sit<>stand w/fww w/Min A in 6tx   Goal Outcome # 2 Progressing slower than expected   Short Term Goal # 3 pt will transfer  bed<>chair w/fww w/Mod A in 6tx   Goal Outcome # 3 Progressing slower than expected   Education Group   Role of Physical Therapist Patient Response Patient;Acceptance;Explanation;Demonstration;Verbal Demonstration;Action Demonstration;Reinforcement Needed   Exercises - Supine Patient Response Patient;Acceptance;Explanation;Reinforcement Needed;Action Demonstration;Verbal Demonstration   Anticipated Discharge Equipment and Recommendations   DC Equipment Recommendations Unable to determine at this time   Discharge Recommendations Recommend post-acute placement for additional physical therapy services prior to discharge home   Interdisciplinary Plan of Care Collaboration   IDT Collaboration with  Nursing;Physician   Patient Position at End of Therapy In Bed;Bed Alarm On;Phone within Reach;Tray Table within Reach;Call Light within Reach   Collaboration Comments re: tx   Session Information   Date / Session Number  4/10-2 ( 2/3, 4/11)

## 2022-04-10 NOTE — DISCHARGE PLANNING
Anticipated Discharge Disposition: Home with HH    Action: Per notes, Pt has refused SNF placement. Pt has referral pending with [facility]. LSW called to inquire about referral and was informed by answering service that there is no one available from admissions over the weekend and advised LSW to call their office in the morning on Monday.     Barriers to Discharge: HH set up    Plan: LSW to update medical team during rounds.

## 2022-04-11 ENCOUNTER — APPOINTMENT (OUTPATIENT)
Dept: PHYSICAL THERAPY | Facility: REHABILITATION | Age: 57
End: 2022-04-11
Attending: NURSE PRACTITIONER
Payer: COMMERCIAL

## 2022-04-11 LAB
ANION GAP SERPL CALC-SCNC: 6 MMOL/L (ref 7–16)
BASOPHILS # BLD AUTO: 0.4 % (ref 0–1.8)
BASOPHILS # BLD: 0.1 K/UL (ref 0–0.12)
BUN SERPL-MCNC: 8 MG/DL (ref 8–22)
CALCIUM SERPL-MCNC: 7.9 MG/DL (ref 8.4–10.2)
CHLORIDE SERPL-SCNC: 92 MMOL/L (ref 96–112)
CO2 SERPL-SCNC: 32 MMOL/L (ref 20–33)
CREAT SERPL-MCNC: 0.43 MG/DL (ref 0.5–1.4)
EOSINOPHIL # BLD AUTO: 0.1 K/UL (ref 0–0.51)
EOSINOPHIL NFR BLD: 0.4 % (ref 0–6.9)
ERYTHROCYTE [DISTWIDTH] IN BLOOD BY AUTOMATED COUNT: 39.4 FL (ref 35.9–50)
GFR SERPLBLD CREATININE-BSD FMLA CKD-EPI: 113 ML/MIN/1.73 M 2
GLUCOSE SERPL-MCNC: 89 MG/DL (ref 65–99)
HCT VFR BLD AUTO: 27 % (ref 37–47)
HGB BLD-MCNC: 9.4 G/DL (ref 12–16)
IMM GRANULOCYTES # BLD AUTO: 0.41 K/UL (ref 0–0.11)
IMM GRANULOCYTES NFR BLD AUTO: 1.6 % (ref 0–0.9)
LYMPHOCYTES # BLD AUTO: 2.94 K/UL (ref 1–4.8)
LYMPHOCYTES NFR BLD: 11.7 % (ref 22–41)
MCH RBC QN AUTO: 27.8 PG (ref 27–33)
MCHC RBC AUTO-ENTMCNC: 34.8 G/DL (ref 33.6–35)
MCV RBC AUTO: 79.9 FL (ref 81.4–97.8)
MONOCYTES # BLD AUTO: 1.24 K/UL (ref 0–0.85)
MONOCYTES NFR BLD AUTO: 4.9 % (ref 0–13.4)
NEUTROPHILS # BLD AUTO: 20.43 K/UL (ref 2–7.15)
NEUTROPHILS NFR BLD: 81 % (ref 44–72)
NRBC # BLD AUTO: 0 K/UL
NRBC BLD-RTO: 0 /100 WBC
PLATELET # BLD AUTO: 555 K/UL (ref 164–446)
PMV BLD AUTO: 9.7 FL (ref 9–12.9)
POTASSIUM SERPL-SCNC: 3.8 MMOL/L (ref 3.6–5.5)
RBC # BLD AUTO: 3.38 M/UL (ref 4.2–5.4)
SODIUM SERPL-SCNC: 130 MMOL/L (ref 135–145)
WBC # BLD AUTO: 25.2 K/UL (ref 4.8–10.8)

## 2022-04-11 PROCEDURE — 700111 HCHG RX REV CODE 636 W/ 250 OVERRIDE (IP): Performed by: INTERNAL MEDICINE

## 2022-04-11 PROCEDURE — A9270 NON-COVERED ITEM OR SERVICE: HCPCS | Performed by: INTERNAL MEDICINE

## 2022-04-11 PROCEDURE — 770006 HCHG ROOM/CARE - MED/SURG/GYN SEMI*

## 2022-04-11 PROCEDURE — 97530 THERAPEUTIC ACTIVITIES: CPT

## 2022-04-11 PROCEDURE — A9270 NON-COVERED ITEM OR SERVICE: HCPCS | Performed by: HOSPITALIST

## 2022-04-11 PROCEDURE — 97598 DBRDMT OPN WND ADDL 20CM/<: CPT

## 2022-04-11 PROCEDURE — 85025 COMPLETE CBC W/AUTO DIFF WBC: CPT

## 2022-04-11 PROCEDURE — 94640 AIRWAY INHALATION TREATMENT: CPT

## 2022-04-11 PROCEDURE — 80048 BASIC METABOLIC PNL TOTAL CA: CPT

## 2022-04-11 PROCEDURE — 302098 PASTE RING (FLAT): Performed by: STUDENT IN AN ORGANIZED HEALTH CARE EDUCATION/TRAINING PROGRAM

## 2022-04-11 PROCEDURE — 99231 SBSQ HOSP IP/OBS SF/LOW 25: CPT | Performed by: STUDENT IN AN ORGANIZED HEALTH CARE EDUCATION/TRAINING PROGRAM

## 2022-04-11 PROCEDURE — 700102 HCHG RX REV CODE 250 W/ 637 OVERRIDE(OP): Performed by: STUDENT IN AN ORGANIZED HEALTH CARE EDUCATION/TRAINING PROGRAM

## 2022-04-11 PROCEDURE — A9270 NON-COVERED ITEM OR SERVICE: HCPCS | Performed by: STUDENT IN AN ORGANIZED HEALTH CARE EDUCATION/TRAINING PROGRAM

## 2022-04-11 PROCEDURE — 94760 N-INVAS EAR/PLS OXIMETRY 1: CPT

## 2022-04-11 PROCEDURE — 700102 HCHG RX REV CODE 250 W/ 637 OVERRIDE(OP): Performed by: INTERNAL MEDICINE

## 2022-04-11 PROCEDURE — 97597 DBRDMT OPN WND 1ST 20 CM/<: CPT

## 2022-04-11 PROCEDURE — 700102 HCHG RX REV CODE 250 W/ 637 OVERRIDE(OP): Performed by: HOSPITALIST

## 2022-04-11 PROCEDURE — 700111 HCHG RX REV CODE 636 W/ 250 OVERRIDE (IP): Performed by: HOSPITALIST

## 2022-04-11 RX ORDER — CEFDINIR 300 MG/1
300 CAPSULE ORAL 2 TIMES DAILY
Qty: 14 CAPSULE | Refills: 0 | Status: SHIPPED | OUTPATIENT
Start: 2022-04-11 | End: 2022-04-18

## 2022-04-11 RX ORDER — CALCIUM CARBONATE 500 MG/1
500 TABLET, CHEWABLE ORAL 3 TIMES DAILY PRN
Status: DISCONTINUED | OUTPATIENT
Start: 2022-04-11 | End: 2022-04-12 | Stop reason: HOSPADM

## 2022-04-11 RX ADMIN — OXYCODONE AND ACETAMINOPHEN 1 TABLET: 10; 325 TABLET ORAL at 14:16

## 2022-04-11 RX ADMIN — OXYCODONE AND ACETAMINOPHEN 1 TABLET: 10; 325 TABLET ORAL at 02:03

## 2022-04-11 RX ADMIN — MORPHINE SULFATE 15 MG: 15 TABLET, FILM COATED, EXTENDED RELEASE ORAL at 04:38

## 2022-04-11 RX ADMIN — FUROSEMIDE 40 MG: 10 INJECTION, SOLUTION INTRAMUSCULAR; INTRAVENOUS at 15:58

## 2022-04-11 RX ADMIN — GABAPENTIN 600 MG: 300 CAPSULE ORAL at 04:37

## 2022-04-11 RX ADMIN — FLUTICASONE PROPIONATE 88 MCG: 44 AEROSOL, METERED RESPIRATORY (INHALATION) at 06:29

## 2022-04-11 RX ADMIN — HEPARIN SODIUM 5000 UNITS: 5000 INJECTION INTRAVENOUS; SUBCUTANEOUS at 04:37

## 2022-04-11 RX ADMIN — GABAPENTIN 600 MG: 300 CAPSULE ORAL at 11:21

## 2022-04-11 RX ADMIN — OXYCODONE AND ACETAMINOPHEN 1 TABLET: 10; 325 TABLET ORAL at 09:31

## 2022-04-11 RX ADMIN — MORPHINE SULFATE 15 MG: 15 TABLET, FILM COATED, EXTENDED RELEASE ORAL at 17:08

## 2022-04-11 RX ADMIN — GABAPENTIN 600 MG: 300 CAPSULE ORAL at 17:08

## 2022-04-11 RX ADMIN — Medication 1 TABLET: at 04:38

## 2022-04-11 RX ADMIN — SODIUM HYPOCHLORITE 1 ML: 1.25 SOLUTION TOPICAL at 04:46

## 2022-04-11 RX ADMIN — SENNOSIDES AND DOCUSATE SODIUM 2 TABLET: 50; 8.6 TABLET ORAL at 17:12

## 2022-04-11 RX ADMIN — FUROSEMIDE 40 MG: 10 INJECTION, SOLUTION INTRAMUSCULAR; INTRAVENOUS at 04:37

## 2022-04-11 RX ADMIN — HEPARIN SODIUM 5000 UNITS: 5000 INJECTION INTRAVENOUS; SUBCUTANEOUS at 14:16

## 2022-04-11 RX ADMIN — FLUTICASONE PROPIONATE 88 MCG: 44 AEROSOL, METERED RESPIRATORY (INHALATION) at 19:00

## 2022-04-11 RX ADMIN — METHOCARBAMOL 500 MG: 500 TABLET ORAL at 04:37

## 2022-04-11 RX ADMIN — METHOCARBAMOL 500 MG: 500 TABLET ORAL at 17:08

## 2022-04-11 RX ADMIN — CALCIUM CARBONATE (ANTACID) CHEW TAB 500 MG 500 MG: 500 CHEW TAB at 14:55

## 2022-04-11 ASSESSMENT — PAIN DESCRIPTION - PAIN TYPE
TYPE: ACUTE PAIN;CHRONIC PAIN
TYPE: CHRONIC PAIN
TYPE: ACUTE PAIN;CHRONIC PAIN
TYPE: CHRONIC PAIN
TYPE: ACUTE PAIN
TYPE: CHRONIC PAIN
TYPE: ACUTE PAIN;CHRONIC PAIN
TYPE: ACUTE PAIN

## 2022-04-11 ASSESSMENT — COGNITIVE AND FUNCTIONAL STATUS - GENERAL
SUGGESTED CMS G CODE MODIFIER MOBILITY: CL
STANDING UP FROM CHAIR USING ARMS: A LITTLE
WALKING IN HOSPITAL ROOM: TOTAL
MOBILITY SCORE: 10
CLIMB 3 TO 5 STEPS WITH RAILING: TOTAL
TURNING FROM BACK TO SIDE WHILE IN FLAT BAD: UNABLE
MOVING FROM LYING ON BACK TO SITTING ON SIDE OF FLAT BED: A LITTLE
MOVING TO AND FROM BED TO CHAIR: UNABLE

## 2022-04-11 ASSESSMENT — GAIT ASSESSMENTS: GAIT LEVEL OF ASSIST: UNABLE TO PARTICIPATE

## 2022-04-11 NOTE — PROGRESS NOTES
Received bedside report.  Assumed pt care.   Assessment and chart check complete.  Pt is AA0X4, no signs of distress, denies nausea/vomiting  Pt pain currently 710, medicated per MAR.   Dressing CDI. CMS intact.  CVC triple lumen right  IJ catheter in place.  1417-Requesting for TUMS for abdominal upset after eating lunch.MD notified. Tums given per MAR.  1600- dressing change by wound care.  Wheelchair and walker was delivered at bedside but per pt it is wrong provider and size. Pt refused it to take at home.   Pt decided to buy as outpatient.  1800- CVC triple lumen IJ catheter removed, pressure applied. No active bleeding noted. VS after BP- 111/56 ND-109 O2 saturation at 90% at RA.   Fall precautions in place, treaded socks on pt, bed in low position.   Call light within reach.   Educated pt to call if needing anything.   Hourly rounding.

## 2022-04-11 NOTE — CARE PLAN
The patient is Stable - Low risk of patient condition declining or worsening    Shift Goals  Clinical Goals: Pt pain will be control by pain medication this shift  Patient Goals: able to nap and rest    Progress made toward(s) clinical / shift goals:  Pain rated high but stable this shift, additional pain medication added. Pt able able to sleep at least 3 hours after giving pain medication.Fall precaution in place.       Patient is not progressing towards the following goals:      Problem: Pain - Standard  Goal: Alleviation of pain or a reduction in pain to the patient’s comfort goal  Outcome: Progressing     Problem: Fall Risk  Goal: Patient will remain free from falls  Outcome: Progressing     Problem: Skin Integrity  Goal: Skin integrity is maintained or improved  Outcome: Progressing

## 2022-04-11 NOTE — PROGRESS NOTES
Received report from day shift nurse. Assumed pt care at 1915. Pt is A&Ox4, resting comfortably in bed. Pt on r.a.. No signs of SOB/respiratory distress. Labs noted, VSS. Fall precautions in place. Bed at lowest position. Call light and personal belongings within reach. Continue to monitor

## 2022-04-11 NOTE — THERAPY
Physical Therapy   Daily Treatment     Patient Name: Karine Ovalle  Age:  56 y.o., Sex:  female  Medical Record #: [id number]  Today's Date: 4/11/2022     Precautions  Precautions: Fall Risk  Comments: RLE Lymphedema and wound on R shin    Assessment    With max encouragement, pt able to transfer to chair with FWW [place], limited by R LE pain. Pt required maxA x2 to get to EOB due to pain R LE.  Transfer to the chair with FWW was very taxing for pt, she was not able to ambulate. Pt is still refusing SNF even though DC home at this point seems unsafe, pt is unable to confirm that she has consistent help at home since her boyfriend works. Pt is unable to mobilize safely without assist. Recommend HHPT.    Plan    Treatment plan modified to 5 times per week until therapy goals are met for the following treatments:  Bed Mobility, Gait Training, Neuro Re-Education / Balance, Therapeutic Activities, and Therapeutic Exercises.    DC Equipment Recommendations: Front-Wheel Walker (pt is also requesting a smaller w/c, the current one that she is renting is too big per pt report)  Discharge Recommendations: Recommend post-acute placement for additional physical therapy services prior to discharge home       04/11/22 1049   Cognition    Comments Pt supine, hesitant to move due to pain, encouragement provided   Balance   Sitting Balance (Static) Fair   Sitting Balance (Dynamic) Fair -   Standing Balance (Static) Fair -   Standing Balance (Dynamic) Poor +   Weight Shift Sitting Fair   Weight Shift Standing Poor   Skilled Intervention Postural Facilitation;Sequencing;Tactile Cuing;Verbal Cuing   Comments stdg with FWW   Gait Analysis   Gait Level Of Assist Unable to Participate   Bed Mobility    Supine to Sit Maximal Assist  (x2)   Functional Mobility   Sit to Stand Minimal Assist   Bed, Chair, Wheelchair Transfer Minimal Assist   Transfer Method Stand Step  (pt requries assist to stabilize FWW during transfer)   Activity  Tolerance   Sitting in Chair as tolerated   Sitting Edge of Bed 10 min   Standing 2 min with FWW   Short Term Goals    Short Term Goal # 1 pt will go supine<>sit w/hob elevated and rails up w/Min A in 6tx   Goal Outcome # 1 Progressing slower than expected   Short Term Goal # 2 pt will go sit<>stand w/fww w/Min A in 6tx   Goal Outcome # 2 Goal met, new goal added   Short Term Goal # 2 B  Pt will be able to perform sit <> stand and transfer with FWW Tyshawn in 6 visits.   Short Term Goal # 3 pt will transfer bed<>chair w/fww w/Mod A in 6tx   Goal Outcome # 3 Goal met, new goal added   Short Term Goal # 3 B Pt will be able to ambulate 20 ft with FWW Tyshawn in 6 visits.

## 2022-04-11 NOTE — CARE PLAN
The patient is Stable - Low risk of patient condition declining or worsening    Shift Goals  Clinical Goals: Pt pain will be control by pain medication this shift.  Patient Goals: wound care before discharge    Progress made toward(s) clinical / shift goals:PRN medication given per pain control. Wound care complete by wound care RN    Patient is not progressing towards the following goals:      Problem: Pain - Standard  Goal: Alleviation of pain or a reduction in pain to the patient’s comfort goal  Outcome: Progressing     Problem: Fall Risk  Goal: Patient will remain free from falls  Outcome: Progressing     Problem: Skin Integrity  Goal: Skin integrity is maintained or improved  Outcome: Progressing     Problem: Discharge Barriers/Planning  Goal: Patient's continuum of care needs are met  Outcome: Progressing  Flowsheets (Taken 4/11/2022 3499)  Continuum of Care Needs:   Assessed for discharge barriers   Communicated discharge barriers to interdisciplinary tream   Involved patient/family/support system in discharge process   Provided and explained discharge instructions

## 2022-04-11 NOTE — CARE PLAN
The patient is Stable - Low risk of patient condition declining or worsening    Shift Goals  Clinical Goals: pain score will lower to 4/10  Patient Goals: sleep for 8 hours tonight    Progress made toward(s) clinical / shift goals:  Pt was seen sleeping from 2000H until this time. Will continue to monitor.     Patient is not progressing towards the following goals:Pt still complains from pain and asking for Percocet po.      Problem: Pain - Standard  Goal: Alleviation of pain or a reduction in pain to the patient’s comfort goal  Outcome: Not Progressing     Problem: Knowledge Deficit - Standard  Goal: Patient and family/care givers will demonstrate understanding of plan of care, disease process/condition, diagnostic tests and medications  Outcome: Progressing     Problem: Physical Regulation  Goal: Diagnostic test results will improve  Outcome: Progressing     Problem: Fall Risk  Goal: Patient will remain free from falls  Outcome: Progressing     Problem: Skin Integrity  Goal: Skin integrity is maintained or improved  Outcome: Progressing

## 2022-04-11 NOTE — DISCHARGE SUMMARY
Discharge Summary    CHIEF COMPLAINT ON ADMISSION  Chief Complaint   Patient presents with   • Leg Pain     R LOWER LEG  HAS WOUND ON IT W/ WOUND VAC IN PLACE  DOG STEPPED ON SAME LEG  NOW HAVING PAIN       Reason for Admission  Leg Infection     Admission Date  4/3/2022    CODE STATUS  Full Code    HPI & HOSPITAL COURSE  Karine Ovalle is a 56 y.o. female with a past medical history of bilateral lower extremity lymphedema, mild intermittent asthma with chronic back pain who presented 4/3/2022 with worsening right lower extremity pain and swelling.  The patient states that she burned her anterior aspect of the right leg at the end of December.  Subsequently she has had an open wound followed by wound care.  She was seen by wound care on 24 March and I reviewed the note and the wound care nurse noted the patient was noncompliant did remove the dressing.  She did have an increase in interval size of her ulceration from prior.  She is not currently on antibiotics.  She states she has not had any associated fevers.  She states that her dog stepped on her leg and she had increased pain since that time.  She has had increased swelling as well.      Wound culture obtained during hospitalization showed a was growing E. coli, Morganella morganii, strep pyogenes.  Patient improved with Rocephin.  She will discharge with Omnicef.  Patient has had difficulty ambulating due to pain and had been recommended to go to SNF but she refused.  Home health and wound care ordered for the patient.  Also walker and wheelchair ordered for the patient.  Patient is chronically on pain meds and resume the current regiment when she discharged home, no opiates provided for the patient.      Therefore, she is discharged in good and stable condition to home with organized home healthcare and close outpatient follow-up.    The patient met 2-midnight criteria for an inpatient stay at the time of discharge.    Discharge Date  4/11/22    FOLLOW  UP ITEMS POST DISCHARGE  Leg wound    DISCHARGE DIAGNOSES  Principal Problem:    Sepsis (HCC) POA: Yes  Active Problems:    Hyponatremia POA: Yes    Essential hypertension POA: Yes    Morbid obesity with BMI of 40.0-44.9, adult (HCC) POA: Yes    Edema POA: Yes    Leg edema POA: Yes    Cellulitis POA: Yes    Leukocytosis POA: Yes    Open wound of right lower extremity POA: Unknown  Resolved Problems:    * No resolved hospital problems. *      FOLLOW UP  Future Appointments   Date Time Provider Department Center   8/29/2022 10:00 AM Micky Rubin M.D. [facility] [address]  [place] [address]  [phone]        Micky Rubin M.D.  [address]  [place] [id number] [phone]    Schedule an appointment as soon as possible for a visit        MEDICATIONS ON DISCHARGE     Medication List      START taking these medications      Instructions   cefdinir 300 MG Caps  Commonly known as: OMNICEF   Take 1 Capsule by mouth 2 times a day for 7 days.  Dose: 300 mg        CHANGE how you take these medications      Instructions   phentermine 37.5 MG capsule  What changed: Another medication with the same name was removed. Continue taking this medication, and follow the directions you see here.   Doctor's comments: Needs office visit every 3 months for refill  Take 1 Capsule by mouth every morning for 90 days.  Dose: 37.5 mg        CONTINUE taking these medications      Instructions   Aleve 220 MG Caps  Generic drug: Naproxen Sodium   Take 660 mg by mouth 2 times a day as needed (For pain).  Dose: 660 mg     CALCIUM CARBONATE-VITAMIN D PO   Take 1 Tablet by mouth every day. Indications: Low Amount of Calcium in the Blood  Dose: 1 Tablet     ferrous sulfate 325 (65 Fe) MG tablet   Take 1 Tab by mouth every morning with breakfast.  Dose: 325 mg     Flovent HFA 44 MCG/ACT Aero  Generic drug: fluticasone   Inhale 2 Puffs 2 times a day. Everyday maintenance steroid  inhaler  Dose: 2 Puff     furosemide 40 MG Tabs  Commonly known as: LASIX   Take 1 Tablet by mouth every day. Indications: Edema  Dose: 40 mg     gabapentin 600 MG tablet  Commonly known as: NEURONTIN   Take 1 tablet by mouth 4 times daily     ipratropium-albuterol 0.5-2.5 (3) MG/3ML nebulizer solution  Commonly known as: DUONEB   Take 3 mL by nebulization every 6 hours as needed for Shortness of Breath.  Dose: 3 mL     methocarbamol 500 MG Tabs  Commonly known as: ROBAXIN   Take 2 Tablets by mouth 4 times a day as needed.  Dose: 1,000 mg     morphine ER 15 MG Tbcr tablet  Commonly known as: MS CONTIN   Take 15 mg by mouth every 12 hours.  Dose: 15 mg     ONE-A-DAY WOMENS 50 PLUS PO   Doctor's comments: supplement  Take 1 tablet by mouth every day.  Dose: 1 tablet      oxyCODONE-acetaminophen  MG Tabs  Commonly known as: PERCOCET-10   Take 1 Tablet by mouth 4 times a day.  Dose: 1 Tablet     potassium chloride SA 20 MEQ Tbcr  Commonly known as: Kdur   Take 1 Tablet by mouth every day.  Dose: 20 mEq     ProAir RespiClick 108 (90 Base) MCG/ACT Aepb  Generic drug: Albuterol Sulfate   Inhale 1 Puff every 6 hours as needed (Wheezing).  Dose: 1 Puff     VITAMIN D PO   Take 1 Capsule by mouth every day.  Dose: 1 Capsule            Allergies  Allergies   Allergen Reactions   • Tobacco [Nicotiana Tabacum] Shortness of Breath     Cigarette smoke causes SOB, rispatory issues       DIET  Orders Placed This Encounter   Procedures   • Diet Order Diet: 2 Gram Sodium (restriction, please chop meat)     Standing Status:   Standing     Number of Occurrences:   1     Order Specific Question:   Diet:     Answer:   2 Gram Sodium [7]     Comments:   restriction, please chop meat       ACTIVITY  As tolerated.  Weight bearing as tolerated    CONSULTATIONS  Ortho-Dr Kellogg  PM&R Dr Rock    PROCEDURES  none    LABORATORY  Lab Results   Component Value Date    SODIUM 130 (L) 04/11/2022    POTASSIUM 3.8 04/11/2022    CHLORIDE  92 (L) 04/11/2022    CO2 32 04/11/2022    GLUCOSE 89 04/11/2022    BUN 8 04/11/2022    CREATININE 0.43 (L) 04/11/2022    CREATININE 0.88 08/14/2010    GLOMRATE >59 08/14/2010        Lab Results   Component Value Date    WBC 25.2 (H) 04/11/2022    WBC 8.2 08/14/2010    HEMOGLOBIN 9.4 (L) 04/11/2022    HEMATOCRIT 27.0 (L) 04/11/2022    PLATELETCT 555 (H) 04/11/2022        Total time of the discharge process exceeds 33 minutes.

## 2022-04-11 NOTE — DISCHARGE PLANNING
Anticipated Discharge Disposition:   Home with HH    Action:   Chart review complete     Discussed patient during rounds. Patient is refusing SNF and therefore we are setting up HH.  [facility].     No other identified discharge needs. RN CM will continue to follow.      Barriers to Discharge:   Medical clearance     Plan:   HCM will continue to follow and assist with discharge needs.

## 2022-04-11 NOTE — FACE TO FACE
Face to Face Note  -  Durable Medical Equipment    Efrain Braswell D.O. - NPI: [id number]  I certify that this patient is under my care and that they had a durable medical equipment(DME)face to face encounter by myself that meets the physician DME face-to-face encounter requirements with this patient on:    Date of encounter:   Patient:                    MRN:                       YOB: 2022  Karine Ovalle  [id number]  1965     The encounter with the patient was in whole, or in part, for the following medical condition, which is the primary reason for durable medical equipment:  Other - leg wound    I certify that, based on my findings, the following durable medical equipment is medically necessary:  Walkers and Wheel Chair.    HOME O2 Saturation Measurements:(Values must be present for Home Oxygen orders)         ,     ,         My Clinical findings support the need for the above equipment due to:  Abnormal Gait    Supporting Symptoms: difficulty walking due to pain    If patient feels more short of breath, they can go up to 6 liters per minute and contact healthcare provider.

## 2022-04-11 NOTE — THERAPY
"Occupational Therapy  Daily Treatment     Patient Name: Karine Ovalle  Age:  56 y.o., Sex:  female  Medical Record #: [id number]  Today's Date: 4/10/2022     Precautions  Precautions: Fall Risk  Comments: B LE edema    Assessment    Pt is progressing slower than expected with therapy, takes an extensive amount of time to mobilize, very anxious during mobility -- hyperfocused on pain, requires frequent redirection and cues for safety, sequencing, and proper hand/foot placement. 2-person assist required for linen change, pericare, and bed mob. Will continue to follow in house.    Plan    Continue current treatment plan.    DC Equipment Recommendations: Unable to determine at this time  Discharge Recommendations: Recommend post-acute placement for additional occupational therapy services prior to discharge home    Subjective    \"I need that 'edge' medicine.\"     Objective       04/10/22 1420   Pain   Pain Scales 0 to 10 Scale    Intervention Medication (see MAR);Breathing technique;Distraction;Repositioned;Rest   Pain 0 - 10 Group   Location Leg   Location Orientation Right   Pain Rating Scale (NPRS) 8   Description Aching;Constant   Comfort Goal Comfort with Movement   Therapist Pain Assessment Post Activity Pain Same as Prior to Activity;Nurse Notified   Non Verbal Descriptors   Non Verbal Scale  Grimacing   Cognition    Cognition / Consciousness X   Level of Consciousness Alert   Attention Impaired   Comments pt is verbose, hyperfocused on pain, and required frequent redirection/cues for proper hand/foot placement   Active ROM Upper Body   Active ROM Upper Body  WDL   Strength Upper Body   Upper Body Strength  WDL   Other Treatments   Other Treatments Provided    Balance   Sitting Balance (Static) Fair   Sitting Balance (Dynamic) Fair -   Standing Balance (Static) Fair -   Standing Balance (Dynamic) Dependent   Weight Shift Sitting Poor   Weight Shift Standing Absent   Skilled Intervention Postural " Facilitation;Verbal Cuing;Tactile Cuing;Sequencing;Compensatory Strategies   Comments able to utilize bed rails on both sides to push up to standing, cues to maintain balance and cues to boost self up in complete standing   Bed Mobility    Supine to Sit Minimal Assist   Sit to Supine Moderate Assist   Scooting Maximal Assist   Rolling Maximal Assist to Rt.;Maximum Assist to Lt.   Skilled Intervention Verbal Cuing;Sequencing;Postural Facilitation;Tactile Cuing;Compensatory Strategies   Comments HOB elevated, bilateral bed rails utilized   Activities of Daily Living   Upper Body Dressing Minimal Assist   Lower Body Dressing Total Assist   Toileting Total Assist   Skilled Intervention Compensatory Strategies;Verbal Cuing;Tactile Cuing;Sequencing   How much help from another person does the patient currently need...   Putting on and taking off regular lower body clothing? 1   Bathing (including washing, rinsing, and drying)? 1   Toileting, which includes using a toilet, bedpan, or urinal? 1   Putting on and taking off regular upper body clothing? 3   Taking care of personal grooming such as brushing teeth? 3   Eating meals? 4   6 Clicks Daily Activity Score 13   Functional Mobility   Sit to Stand Maximal Assist   Bed, Chair, Wheelchair Transfer Unable to Participate   Toilet Transfers Unable to Participate   Mobility bed mob, EOB, and STS only   Comments cues for proper hand/foot placement and use of B UE strength to hold self up in standing during linen change   Activity Tolerance   Sitting in Chair Unable   Sitting Edge of Bed 15 mins   Standing ~15-20secs   Comments limited by pain, weakness, and behavior   Patient / Family Goals   Patient / Family Goal #1 to get the excess fluid off her legs   Goal #1 Outcome Progressing slower than expected   Short Term Goals   Short Term Goal # 1 Pt will transfer to/from bedside commode with min assist and FWW   Goal Outcome # 1 Progressing slower than expected   Short Term Goal #  2 Pt will stand at the sink to wash her hands with SBA   Goal Outcome # 2 Progressing slower than expected   Education Group   Education Provided Pathology of bedrest   Pathology of Bedrest Patient Response Patient;Acceptance;Verbal Demonstration   Anticipated Discharge Equipment and Recommendations   DC Equipment Recommendations Unable to determine at this time   Discharge Recommendations Recommend post-acute placement for additional occupational therapy services prior to discharge home   Interdisciplinary Plan of Care Collaboration   IDT Collaboration with  Nursing;Physical Therapist;Certified Nursing Assistant   Patient Position at End of Therapy In Bed;Bed Alarm On;Call Light within Reach;Tray Table within Reach;Phone within Reach   Collaboration Comments RN aware of OT session   Session Information   Date / Session Number  4/10 #2 (2/3, 4/11)   Priority 2

## 2022-04-11 NOTE — DISCHARGE PLANNING
[facility] Name: [facility]  Spoke To: Alysia   Outcome: Pt accepted to agency services.     Received Choice form at 1235  Agency/Facility Name: [facility]  Referral sent per Choice form @ 1405     1510-  Agency/Facility Name: [facility]  Spoke To: Jayla  Outcome: Pt desires 18' WC however DME provider does not have requested sized chair. DPA to follow up with alternate DME providers for chair stock.    1511-  [facility]/Facility Name: [facility]  Spoke To: Shalom  Outcome: Provider does not have stock of 18' chair legs.    1515-  Agency/Facility Name: [facility]  Spoke To: Rufus  Outcome: DME provider is not contracted with insurance however has stock, [facility] willing to accept and approved service for $200.00    RN CM notified     1620-  Agency/Facility Name: [facility]  Outcome: DPA sent referral per RN CM request.

## 2022-04-11 NOTE — THERAPY
"Occupational Therapy  Daily Treatment     Patient Name: Karine Ovalle  Age:  56 y.o., Sex:  female  Medical Record #: [id number]  Today's Date: 4/11/2022     Precautions  Precautions: Fall Risk  Comments: RLE Lymphedema and wound on R shin    Assessment    Pt agreeable to therapy with much encouragement and pre-medication for pain.  She was asking if we had a seated foot bike she could use \"to get myself walking again.\" Pt required 2 person assist for be moblilty.   Pt initially refusing to try and walk, and putting forth minimal effort to stand telling therapist to work harder to help her stand.  (see subjective).  Once pt allowed R knee to be bent slightly, she was able to stand with [place].  Pt was educated that she cannot go home until she can demonstrate she can transfer so she was motivated to take a couple steps to chair.  Pt is NOT independent with transfers, and is unable to identify how much consistent assist she will have at home from SO.  For this reason, home is unsafe at this time and pt would benefit from further inpt post acute therapy prior to home.  However, she is refusing this, so at a minimum, she would need HH therapy, w/c, FWW and bedside commode avail at home and as close to full time assist as possible.  OT will follow while in house.      Plan    Continue current treatment plan.    DC Equipment Recommendations: Front-Wheel Walker  Discharge Recommendations: Recommend post-acute placement for additional occupational therapy services prior to discharge home (pt refusing post acute, needs at least HH)    Subjective    \"You're gonna need to put your back into it and help me stand up\"     Objective       04/11/22 1057   Cognition    Cognition / Consciousness X   Level of Consciousness Alert   Safety Awareness Impaired   Attention Impaired   Comments Pt tangential, perseverating on small details, not able to see the big picture related to the amount of mobility she needs to have for a safe d/c " home.  She has limited attention span and difficulty following directions at times and is refusing any post acute therapy   Active ROM Upper Body   Active ROM Upper Body  WDL   Strength Upper Body   Upper Body Strength  WDL   Other Treatments   Other Treatments Provided was not able to be specific about how much assist SO can provide at   Balance   Sitting Balance (Static) Fair   Sitting Balance (Dynamic) Fair -   Standing Balance (Static) Fair -   Standing Balance (Dynamic) Poor +   Weight Shift Sitting Fair   Weight Shift Standing Poor   Skilled Intervention Verbal Cuing;Tactile Cuing;Sequencing;Postural Facilitation   Comments standing with FWW   Bed Mobility    Supine to Sit Maximal Assist  (x2)   Sit to Supine   (UIC)   Scooting Maximal Assist   Skilled Intervention Verbal Cuing;Tactile Cuing;Sequencing;Postural Facilitation   Comments HOB elevated, use of rail   Activities of Daily Living   Grooming Supervision;Seated   Lower Body Dressing Total Assist   Toileting Total Assist   Comments Talked to pt about needing to have a bedside commode. She said she thinks she has one in storage somewhere but did not demonstrate any urgency in contacting anyone to start locating equipment prior to discharge.   Functional Mobility   Sit to Stand Minimal Assist   Bed, Chair, Wheelchair Transfer Minimal Assist   Toilet Transfers Unable to Participate   Transfer Method Stand Step   Mobility Pivot 3-4 steps from EOB to chair with FWW   Comments Cues to push up from bed rather than pulling on FWW for sit to stand, perseverating on pulling on walker which then requires a physical assist of at least 1 to stabilize the walker when she stands   Activity Tolerance   Sitting in Chair > 10 min at end of session   Sitting Edge of Bed 10 min   Standing 15 sec, then 2 min with FWW   Comments limited by pain, weakness   Patient / Family Goals   Patient / Family Goal #1 to get the excess fluid off her legs   Goal #1 Outcome Progressing  slower than expected   Short Term Goals   Short Term Goal # 1 Pt will transfer to/from bedside commode with min assist and FWW   Goal Outcome # 1 Progressing slower than expected   Short Term Goal # 2 Pt will stand at the sink to wash her hands with SBA   Goal Outcome # 2 Progressing slower than expected   Education Group   Education Provided Adaptive Equipment;Transfers;Home Safety   Home Safety Patient Response Patient;Acceptance;Explanation;Reinforcement Needed   Transfers Patient Response Patient;Acceptance;Explanation;Demonstration;Reinforcement Needed   Adaptive Equipment Patient Response Patient;Acceptance;Explanation;Verbal Demonstration;Reinforcement Needed   Additional Comments Educ on need for w/c, BSC and FWW if she is refusing post acute and insisting on home

## 2022-04-12 VITALS
TEMPERATURE: 98 F | SYSTOLIC BLOOD PRESSURE: 117 MMHG | HEART RATE: 89 BPM | OXYGEN SATURATION: 90 % | WEIGHT: 234.1 LBS | HEIGHT: 60 IN | DIASTOLIC BLOOD PRESSURE: 67 MMHG | RESPIRATION RATE: 18 BRPM | BODY MASS INDEX: 45.96 KG/M2

## 2022-04-12 PROCEDURE — 700102 HCHG RX REV CODE 250 W/ 637 OVERRIDE(OP): Performed by: INTERNAL MEDICINE

## 2022-04-12 PROCEDURE — A9270 NON-COVERED ITEM OR SERVICE: HCPCS | Performed by: STUDENT IN AN ORGANIZED HEALTH CARE EDUCATION/TRAINING PROGRAM

## 2022-04-12 PROCEDURE — 700102 HCHG RX REV CODE 250 W/ 637 OVERRIDE(OP): Performed by: STUDENT IN AN ORGANIZED HEALTH CARE EDUCATION/TRAINING PROGRAM

## 2022-04-12 PROCEDURE — A9270 NON-COVERED ITEM OR SERVICE: HCPCS | Performed by: HOSPITALIST

## 2022-04-12 PROCEDURE — 99239 HOSP IP/OBS DSCHRG MGMT >30: CPT | Performed by: STUDENT IN AN ORGANIZED HEALTH CARE EDUCATION/TRAINING PROGRAM

## 2022-04-12 PROCEDURE — 94640 AIRWAY INHALATION TREATMENT: CPT

## 2022-04-12 PROCEDURE — 700111 HCHG RX REV CODE 636 W/ 250 OVERRIDE (IP): Performed by: HOSPITALIST

## 2022-04-12 PROCEDURE — A9270 NON-COVERED ITEM OR SERVICE: HCPCS | Performed by: INTERNAL MEDICINE

## 2022-04-12 PROCEDURE — 700102 HCHG RX REV CODE 250 W/ 637 OVERRIDE(OP): Performed by: HOSPITALIST

## 2022-04-12 RX ADMIN — HEPARIN SODIUM 5000 UNITS: 5000 INJECTION INTRAVENOUS; SUBCUTANEOUS at 05:33

## 2022-04-12 RX ADMIN — MORPHINE SULFATE 15 MG: 15 TABLET, FILM COATED, EXTENDED RELEASE ORAL at 05:33

## 2022-04-12 RX ADMIN — OXYCODONE AND ACETAMINOPHEN 1 TABLET: 10; 325 TABLET ORAL at 08:21

## 2022-04-12 RX ADMIN — OXYCODONE AND ACETAMINOPHEN 1 TABLET: 10; 325 TABLET ORAL at 00:44

## 2022-04-12 RX ADMIN — FLUTICASONE PROPIONATE 88 MCG: 44 AEROSOL, METERED RESPIRATORY (INHALATION) at 06:59

## 2022-04-12 RX ADMIN — GABAPENTIN 600 MG: 300 CAPSULE ORAL at 05:33

## 2022-04-12 RX ADMIN — Medication 1 TABLET: at 05:33

## 2022-04-12 RX ADMIN — METHOCARBAMOL 500 MG: 500 TABLET ORAL at 05:33

## 2022-04-12 ASSESSMENT — ENCOUNTER SYMPTOMS
VOMITING: 0
FEVER: 0
MYALGIAS: 1
BACK PAIN: 1
NAUSEA: 0
CONSTIPATION: 0
SHORTNESS OF BREATH: 0
WHEEZING: 0
DIARRHEA: 0
ABDOMINAL PAIN: 0
CHILLS: 0
HEADACHES: 0
NECK PAIN: 0

## 2022-04-12 ASSESSMENT — PAIN DESCRIPTION - PAIN TYPE
TYPE: ACUTE PAIN;CHRONIC PAIN

## 2022-04-12 NOTE — PROGRESS NOTES
Hospital Medicine Daily Progress Note    Date of Service  4/12/2022    Chief Complaint  Karine Ovalle is a 56 y.o. female admitted 4/3/2022 with worsening wound    Hospital Course  Karine Ovalle is a 56 y.o. female with a past medical history of bilateral lower extremity lymphedema, mild intermittent asthma with chronic back pain who presented 4/3/2022 with worsening right lower extremity pain and swelling.  The patient states that she burned her anterior aspect of the right leg at the end of December.  Subsequently she has had an open wound followed by wound care.  She was seen by wound care on 24 March and I reviewed the note and the wound care nurse noted the patient was noncompliant did remove the dressing.  She did have an increase in interval size of her ulceration from prior.  She is not currently on antibiotics.  She states she has not had any associated fevers.  She states that her dog stepped on her leg and she had increased pain since that time.  She has had increased swelling as well.        Interval Problem Update  4/4 Patient is seen and examined at the bedside.  She is a drowsy, but is able to stay awake to answer questions.  AO x4.  She reported severe pain of RLE.  On Dilaudid as needed.    MRSA screen negative, discontinued vancomycin  Previous wound culture 2/2022 positive for Pseudomonas aeruginosa, MSSA, Morganella morganii, continue Zosyn  Bladder culture, wound culture pending  Leukocytosis with WBC 15, trending down to 47. Denies diarrhea, dysuria  Sodium 129  Leukocytosis resolved  CT RLE negative for abscess or osteomyelitis    4/5 Patient continues to be tachycardic spiked a fever this afternoon.  Discussed case with infectious disease who recommended to continue Zosyn 4.5 g for now and to wait for repeat cultures.  Discussed with wound care who recommended EVELYNE/arterial ultrasound, they do not think patient needs surgical debridement at this point. Patient's main complaint is  her swollen legs she reports last time she was admitted she was placed on 2 g sodium restriction diet and IV Lasix with significant improvement. Also noted that patient with significant coughing while trying to eat lunch, she reports she has had difficulty since she had neck surgery but does ok when she eats slow, SLP eval ordered.     4/6 Wound culture growing group A strep, E. Coli, morganella morganii, pending sensitivities. Discussed with ID for now continue zosyn. Persistent wbc 41. Consulted orthopedic surgery to see if wound need debridement given continued leukocytosis and fever yesterday, spoke with Dr. Kellogg did not think patient needed debridement. EVELYNE no indicative of significant stenosis, arterial US not performed. After discussion with ID and ortho likely something else causing leukocytosis, HIV and procal pending. May need oncology and possible bone marrow bx if does not improve, so far bcx negative. Patient continues to have severe pain in her leg, increased pain regimen dosing.    4/7  Patient appears to be pleasantly confused.  WBC down to 34, afebrile  Urine output insufficient on IV Lasix  SLP recommendations noted    4/8: Patient complaining of leg pain reevaluated the wound.  We will continue antibiotics.  White count is downtrending.  Her cultures have been growing E. coli, Morganella morganii and strep pyogenes.  Home health ordered.  Patient denies diarrhea do not suspect C. Difficile.    4/9: Patient continues to endorse leg pain and swelling.  White count is not improved since yesterday.  Will order repeat CT to evaluate.    4/10: Patient continues to endorse leg pain.  CT yesterday did not show an abscess and appears unchanged from the prior.  Patient does not participate much with physical therapy, she says due to pain.  White count decreased off of antibiotics, will maintain patient off antibiotics at this time.  Trend CBC.    4/11: Vitals been stable.  White count has leveled off.   Plan was to discharge the patient yesterday however her ride was unable to pick her up.  She will have to discharge tomorrow.  No overnight events.  Vitals are stable.    I have personally seen and examined the patient at bedside. I discussed the plan of care with patient, bedside RN, charge RN,  and infectious disease and orthopedics.    Consultants/Specialty  Orthopedic surgery    Code Status  Full Code    Disposition  Patient is not medically cleared for discharge.   Anticipate discharge to to skilled nursing facility.  I have placed the appropriate orders for post-discharge needs.    Review of Systems  Review of Systems   Constitutional: Positive for malaise/fatigue. Negative for chills and fever.   Respiratory: Negative for shortness of breath and wheezing.    Cardiovascular: Positive for leg swelling. Negative for chest pain.   Gastrointestinal: Negative for abdominal pain, constipation, diarrhea, nausea and vomiting.   Genitourinary: Negative for dysuria, frequency and urgency.   Musculoskeletal: Positive for back pain and myalgias. Negative for neck pain.   Neurological: Negative for headaches. Speech change:      Psychiatric/Behavioral: Suicidal ideas:      All other systems reviewed and are negative.       Physical Exam  Temp:  [36.7 °C (98 °F)-37.2 °C (99 °F)] 36.7 °C (98 °F)  Pulse:  [] 89  Resp:  [16-18] 18  BP: (103-124)/(51-77) 117/67  SpO2:  [0 %-90 %] 90 %    Physical Exam  Vitals and nursing note reviewed.   Constitutional:       General: She is not in acute distress.     Appearance: She is obese. She is ill-appearing.   HENT:      Head: Normocephalic and atraumatic.   Eyes:      General: No scleral icterus.        Right eye: No discharge.         Left eye: No discharge.   Cardiovascular:      Rate and Rhythm: Normal rate and regular rhythm.      Heart sounds: Normal heart sounds.   Pulmonary:      Effort: Pulmonary effort is normal. No respiratory distress.      Breath sounds:  Normal breath sounds.   Abdominal:      General: Bowel sounds are normal. There is no distension.      Palpations: Abdomen is soft.      Tenderness: There is no abdominal tenderness. There is no guarding.   Musculoskeletal:         General: Tenderness present.      Cervical back: Normal range of motion.      Right lower leg: Edema present.      Left lower leg: Edema present.      Comments: Right lower extremity erythema, swelling, tender, clean dressing in place   Skin:     General: Skin is warm and dry.      Coloration: Skin is not jaundiced.   Neurological:      Mental Status: She is alert and oriented to person, place, and time. Mental status is at baseline.   Psychiatric:         Mood and Affect: Mood normal.         Thought Content: Thought content normal.         Fluids    Intake/Output Summary (Last 24 hours) at 4/12/2022 0746  Last data filed at 4/12/2022 0000  Gross per 24 hour   Intake 120 ml   Output 1250 ml   Net -1130 ml       Laboratory  Recent Labs     04/10/22  0309 04/11/22  0155   WBC 25.7* 25.2*   RBC 3.35* 3.38*   HEMOGLOBIN 9.1* 9.4*   HEMATOCRIT 26.6* 27.0*   MCV 79.4* 79.9*   MCH 27.2 27.8   MCHC 34.2 34.8   RDW 40.1 39.4   PLATELETCT 473* 555*   MPV 9.5 9.7     Recent Labs     04/10/22  0309 04/11/22  0155   SODIUM 130* 130*   POTASSIUM 3.6 3.8   CHLORIDE 93* 92*   CO2 30 32   GLUCOSE 118* 89   BUN 9 8   CREATININE 0.46* 0.43*   CALCIUM 7.9* 7.9*                   Imaging  CT-EXTREMITY, LOWER WITH RIGHT   Final Result      1.  Negative for abscess      2.  Diffuse cutaneous/subcutaneous edema of the right distal thigh and leg      3.  Presence of right knee arthroplasty      4.  Apparent open wound anteromedial leg      US-EVELYNE SINGLE LEVEL UNILAT RIGHT   Final Result      DX-CHEST-PORTABLE (1 VIEW)   Final Result         1.  No acute cardiopulmonary disease.      CT-EXTREMITY, LOWER WITH RIGHT   Final Result      1.  There is subcutaneous edema in the right lower leg with a large anterior  lower leg ulcer/wound defect.   2.  There is no subcutaneous fluid collection to suggest abscess.   3.  There is no CT evidence of osteomyelitis.      DX-TIBIA AND FIBULA RIGHT   Final Result      1.  Large ulcer/wound in the anterior mid right leg.   2.  No acute osseous abnormality.      US-EXTREMITY VENOUS LOWER UNILAT RIGHT   Final Result      1.  No evidence of Right  lower extremity deep venous thrombosis.      2.  Exam limited due to extensive subcutaneous edema.           Assessment/Plan  * Sepsis (HCC)- (present on admission)  Assessment & Plan  This is Sepsis Present on admission  SIRS criteria identified on my evaluation include: Tachycardia, with heart rate greater than 90 BPM and Leukocytosis, with WBC greater than 12,000  Source is cellulitis  Sepsis protocol initiated  Fluid resuscitation ordered per protocol  Crystalloid Fluid Administration: Fluid resuscitation ordered per standard protocol - 30 mL/kg per current or ideal body weight  IV antibiotics as appropriate for source of sepsis  Reassessment: I have reassessed the patient's hemodynamic status    Resolved    Open wound of right lower extremity  Assessment & Plan  she burned her anterior aspect of the right leg at the end of December.  Subsequently she has had an open wound followed by wound care.   Noticed worsening wound per wound care  Previous wound culture 2/2022 positive for Pseudomonas aeruginosa, MSSA, Morganella morganii     Continue Zosyn  wound care consulted  Discussed with ID, continue zosyn for now, f/u wound cx  Ortho consulted, no need for surgical debridement     Leukocytosis- (present on admission)  Assessment & Plan  Marked leukocytosis.  Likely secondary to cellulitis and wound infection  Per ID and ortho likely wound not causing significant leukocytosis   Blood culture, wound culture pending  HIV and procal pending   Consider oncology consult if not improving    CT scan of the right lower extremity negative for abscess and  osteomyelitis  Continue Zosyn    4/8 white count is downtrending.  Procalcitonin continues to be elevated.  Wound reevaluated does not appear to have underlying abscess.  For now continue antibiotics and trend WBC.     4/10: WBC improving off antibiotics.  Repeat CT did not show an abscess    Cellulitis- (present on admission)  Assessment & Plan  Started on Zosyn and vancomycin in the emergency room  MRSA screen negative, discontinue Vanco  Wound culture positive for E Coli, M Morganii, Strep Pyogenes  Continue Zosyn  Repeat CT leg        Leg edema- (present on admission)  Assessment & Plan  R>L  Start IV lasix 40 mg   Diet- 2 g sodium restriction  Venous US negative for DVT    Edema- (present on admission)  Assessment & Plan  Extensive edema of the right lower extremity, will check CT scan to rule out underlying abscess  Consider restarting home diuretics when blood pressure is more stable    Morbid obesity with BMI of 40.0-44.9, adult (HCC)- (present on admission)  Assessment & Plan  BMI 44    Essential hypertension- (present on admission)  Assessment & Plan  Not currently on scheduled medications.  I will start as needed hydralazine labetalol for extreme hypertension    Hyponatremia- (present on admission)  Assessment & Plan  Hypovolemic hyponatremia  I expect Na to improve with IVF        VTE prophylaxis: SCDs/TEDs and heparin ppx    I have performed a physical exam and reviewed and updated ROS and Plan today (4/12/2022). In review of yesterday's note (4/11/2022), there are no changes except as documented above.

## 2022-04-12 NOTE — PROGRESS NOTES
Received bedside patient report from [facility]. Patient resting comfortably in bed, no complaints at this time. Safety precautions in place. Will continue to monitor.

## 2022-04-12 NOTE — DISCHARGE INSTRUCTIONS
Discharge Instructions    Discharged to home by car with relative. Discharged via wheelchair, hospital escort: Yes.  Special equipment needed: Wheelchair    Be sure to schedule a follow-up appointment with your primary care doctor or any specialists as instructed.     Discharge Plan:    Wound care  Remove dressing. Clean wound with NS & gauze. Honey leaves a yellow residual, make sure to clean this off the wound bed. Cut honey colloid to fit wound bed. Peel off plastic backing from honey colloid and place adherent side to wound bed.  Cover with Mepilex. Change every other day  and as needed for saturation or dislodgement.-- May apply two layer wrap for compression if needed.       I understand that a diet low in cholesterol, fat, and sodium is recommended for good health. Unless I have been given specific instructions below for another diet, I accept this instruction as my diet prescription.   Other diet: 2 gram sodium    Special Instructions:       · Is patient discharged on Warfarin / Coumadin?   No     Depression / Suicide Risk    As you are discharged from this [facility] facility, it is important to learn how to keep safe from harming yourself.    Recognize the warning signs:  · Abrupt changes in personality, positive or negative- including increase in energy   · Giving away possessions  · Change in eating patterns- significant weight changes-  positive or negative  · Change in sleeping patterns- unable to sleep or sleeping all the time   · Unwillingness or inability to communicate  · Depression  · Unusual sadness, discouragement and loneliness  · Talk of wanting to die  · Neglect of personal appearance   · Rebelliousness- reckless behavior  · Withdrawal from people/activities they love  · Confusion- inability to concentrate     If you or a loved one observes any of these behaviors or has concerns about self-harm, here's what you can do:  · Talk about it- your feelings and reasons for harming  yourself  · Remove any means that you might use to hurt yourself (examples: pills, rope, extension cords, firearm)  · Get professional help from the community (Mental Health, Substance Abuse, psychological counseling)  · Do not be alone:Call your Safe Contact- someone whom you trust who will be there for you.  · Call your local CRISIS HOTLINE 450-5422 or 774-821-1762  · Call your local Children's Mobile Crisis Response Team [place] (050) 087-1258 or wwwInfiKno  · Call the toll free National Suicide Prevention Hotlines   · National Suicide Prevention Lifeline 554-049-LJHT (5325)  · National Hope Line Network 800-SUICIDE (315-1549)        Sepsis, Diagnosis, Adult  Sepsis is a serious bodily reaction to an infection. The infection that triggers sepsis may be from a bacteria, virus, or fungus. Sepsis can result from an infection in any part of your body. Infections that commonly lead to sepsis include skin, lung, and urinary tract infections.  Sepsis is a medical emergency that must be treated right away in a hospital. In severe cases, it can lead to septic shock. Septic shock can weaken your heart and cause your blood pressure to drop. This can cause your central nervous system and your body's organs to stop working.  What are the causes?  This condition is caused by a severe reaction to infections from bacteria, viruses, or fungus. The germs that most often lead to sepsis include:  · Escherichia coli (E. coli) bacteria.  · Staphylococcus aureus (staph) bacteria.  · Some types of Streptococcus bacteria.  The most common infections affect these organs:  · The lung (pneumonia).  · The kidneys or bladder (urinary tract infection).  · The skin (cellulitis).  · The bowel, gallbladder, or pancreas.  What increases the risk?  You are more likely to develop this condition if:  · Your body's disease-fighting system (immune system) is weakened.  · You are age 65 or older.  · You are male.  · You had surgery or  you have been hospitalized.  · You have these devices inserted into your body:  ? A small, thin tube (catheter).  ? IV line.  ? Breathing tube.  ? Drainage tube.  · You are not getting enough nutrients from food (malnourished).  · You have a long-term (chronic) disease, such as cancer, lung disease, kidney disease, or diabetes.  · You are .  What are the signs or symptoms?  Symptoms of this condition may include:  · Fever.  · Chills or feeling very cold.  · Confusion or anxiety.  · Fatigue.  · Muscle aches.  · Shortness of breath.  · Nausea and vomiting.  · Urinating much less than usual.  · Fast heart rate (tachycardia).  · Rapid breathing (hyperventilation).  · Changes in skin color. Your skin may look blotchy, pale, or blue.  · Cool, clammy, or sweaty skin.  · Skin rash.  Other symptoms depend on the source of your infection.  How is this diagnosed?  This condition is diagnosed based on:  · Your symptoms.  · Your medical history.  · A physical exam.  Other tests may also be done to find out the cause of the infection and how severe the sepsis is. These tests may include:  · Blood tests.  · Urine tests.  · Swabs from other areas of your body that may have an infection. These samples may be tested (cultured) to find out what type of bacteria is causing the infection.  · Chest X-ray to check for pneumonia. Other imaging tests, such as a CT scan, may also be done.  · Lumbar puncture. This removes a small amount of the fluid that surrounds your brain and spinal cord. The fluid is then examined for infection.  How is this treated?  This condition must be treated in a hospital. Based on the cause of your infection, you may be given an antibiotic, antiviral, or antifungal medicine.  You may also receive:  · Fluids through an IV.  · Oxygen and breathing assistance.  · Medicines to increase your blood pressure.  · Kidney dialysis. This process cleans your blood if your kidneys have failed.  · Surgery to  remove infected tissue.  · Blood transfusion if needed.  · Medicine to prevent blood clots.  · Nutrients to correct imbalances in basic body function (metabolism). You may:  ? Receive important salts and minerals (electrolytes) through an IV.  ? Have your blood sugar level adjusted.  Follow these instructions at home:  Medicines    · Take over-the-counter and prescription medicines only as told by your health care provider.  · If you were prescribed an antibiotic, antiviral, or antifungal medicine, take it as told by your health care provider. Do not stop taking the medicine even if you start to feel better.  General instructions  · If you have a catheter or other indwelling device, ask to have it removed as soon as possible.  · Keep all follow-up visits as told by your health care provider. This is important.  Contact a health care provider if:  · You do not feel like you are getting better or regaining strength.  · You are having trouble coping with your recovery.  · You frequently feel tired.  · You feel worse or do not seem to get better after surgery.  · You think you may have an infection after surgery.  Get help right away if:  · You have any symptoms of sepsis.  · You have difficulty breathing.  · You have a rapid or skipping heartbeat.  · You become confused or disoriented.  · You have a high fever.  · Your skin becomes blotchy, pale, or blue.  · You have an infection that is getting worse or not getting better.  These symptoms may represent a serious problem that is an emergency. Do not wait to see if the symptoms will go away. Get medical help right away. Call your local emergency services (911 in the [place] Do not drive yourself to the hospital.  Summary  · Sepsis is a medical emergency that requires immediate treatment in a hospital.  · This condition is caused by a severe reaction to infections from bacteria, viruses, or fungus.  · Based on the cause of your infection, you may be given an antibiotic,  antiviral, or antifungal medicine.  · Treatment may also include IV fluids, breathing assistance, and kidney dialysis.  This information is not intended to replace advice given to you by your health care provider. Make sure you discuss any questions you have with your health care provider.  Document Released: 09/15/2004 Document Revised: 07/26/2019 Document Reviewed: 07/26/2019    [employer] Patient Education © 2020 [employer]        Cellulitis, Adult    Cellulitis is a skin infection. The infected area is often warm, red, swollen, and sore. It occurs most often in the arms and lower legs. It is very important to get treated for this condition.  What are the causes?  This condition is caused by bacteria. The bacteria enter through a break in the skin, such as a cut, burn, insect bite, open sore, or crack.  What increases the risk?  This condition is more likely to occur in people who:  · Have a weak body defense system (immune system).  · Have open cuts, burns, bites, or scrapes on the skin.  · Are older than 60 years of age.  · Have a blood sugar problem (diabetes).  · Have a long-lasting (chronic) liver disease (cirrhosis) or kidney disease.  · Are very overweight (obese).  · Have a skin problem, such as:  ? Itchy rash (eczema).  ? Slow movement of blood in the veins (venous stasis).  ? Fluid buildup below the skin (edema).  · Have been treated with high-energy rays (radiation).  · Use IV drugs.  What are the signs or symptoms?  Symptoms of this condition include:  · Skin that is:  ? Red.  ? Streaking.  ? Spotting.  ? Swollen.  ? Sore or painful when you touch it.  ? Warm.  · A fever.  · Chills.  · Blisters.  How is this diagnosed?  This condition is diagnosed based on:  · Medical history.  · Physical exam.  · Blood tests.  · Imaging tests.  How is this treated?  Treatment for this condition may include:  · Medicines to treat infections or allergies.  · Home care, such as:  ? Rest.  ? Placing cold or warm cloths  (compresses) on the skin.  · Hospital care, if the condition is very bad.  Follow these instructions at home:  Medicines  · Take over-the-counter and prescription medicines only as told by your doctor.  · If you were prescribed an antibiotic medicine, take it as told by your doctor. Do not stop taking it even if you start to feel better.  General instructions    · Drink enough fluid to keep your pee (urine) pale yellow.  · Do not touch or rub the infected area.  · Raise (elevate) the infected area above the level of your heart while you are sitting or lying down.  · Place cold or warm cloths on the area as told by your doctor.  · Keep all follow-up visits as told by your doctor. This is important.  Contact a doctor if:  · You have a fever.  · You do not start to get better after 1-2 days of treatment.  · Your bone or joint under the infected area starts to hurt after the skin has healed.  · Your infection comes back. This can happen in the same area or another area.  · You have a swollen bump in the area.  · You have new symptoms.  · You feel ill and have muscle aches and pains.  Get help right away if:  · Your symptoms get worse.  · You feel very sleepy.  · You throw up (vomit) or have watery poop (diarrhea) for a long time.  · You see red streaks coming from the area.  · Your red area gets larger.  · Your red area turns dark in color.  These symptoms may represent a serious problem that is an emergency. Do not wait to see if the symptoms will go away. Get medical help right away. Call your local emergency services (911 in the [place] Do not drive yourself to the hospital.  Summary  · Cellulitis is a skin infection. The area is often warm, red, swollen, and sore.  · This condition is treated with medicines, rest, and cold and warm cloths.  · Take all medicines only as told by your doctor.  · Tell your doctor if symptoms do not start to get better after 1-2 days of treatment.  This information is not intended to  replace advice given to you by your health care provider. Make sure you discuss any questions you have with your health care provider.  Document Released: 06/05/2009 Document Revised: 05/09/2019 Document Reviewed: 05/09/2019  Elsevier Patient Education © 2020 [employer]        Wound Care, Adult  Taking care of your wound properly can help to prevent pain, infection, and scarring. It can also help your wound to heal more quickly.  How to care for your wound  Wound care         · Follow instructions from your health care provider about how to take care of your wound. Make sure you:  ? Wash your hands with soap and water before you change the bandage (dressing). If soap and water are not available, use hand .  ? Change your dressing as told by your health care provider.  ? Leave stitches (sutures), skin glue, or adhesive strips in place. These skin closures may need to stay in place for 2 weeks or longer. If adhesive strip edges start to loosen and curl up, you may trim the loose edges. Do not remove adhesive strips completely unless your health care provider tells you to do that.  · Check your wound area every day for signs of infection. Check for:  ? Redness, swelling, or pain.  ? Fluid or blood.  ? Warmth.  ? Pus or a bad smell.  · Ask your health care provider if you should clean the wound with mild soap and water. Doing this may include:  ? Using a clean towel to pat the wound dry after cleaning it. Do not rub or scrub the wound.  ? Applying a cream or ointment. Do this only as told by your health care provider.  ? Covering the incision with a clean dressing.  · Ask your health care provider when you can leave the wound uncovered.  · Keep the dressing dry until your health care provider says it can be removed. Do not take baths, swim, use a hot tub, or do anything that would put the wound underwater until your health care provider approves. Ask your health care provider if you can take showers. You may  only be allowed to take sponge baths.  Medicines    · If you were prescribed an antibiotic medicine, cream, or ointment, take or use the antibiotic as told by your health care provider. Do not stop taking or using the antibiotic even if your condition improves.  · Take over-the-counter and prescription medicines only as told by your health care provider. If you were prescribed pain medicine, take it 30 or more minutes before you do any wound care or as told by your health care provider.  General instructions  · Return to your normal activities as told by your health care provider. Ask your health care provider what activities are safe.  · Do not scratch or pick at the wound.  · Do not use any products that contain nicotine or tobacco, such as cigarettes and e-cigarettes. These may delay wound healing. If you need help quitting, ask your health care provider.  · Keep all follow-up visits as told by your health care provider. This is important.  · Eat a diet that includes protein, vitamin A, vitamin C, and other nutrient-rich foods to help the wound heal.  ? Foods rich in protein include meat, dairy, beans, nuts, and other sources.  ? Foods rich in vitamin A include carrots and dark green, leafy vegetables.  ? Foods rich in vitamin C include citrus, tomatoes, and other fruits and vegetables.  ? Nutrient-rich foods have protein, carbohydrates, fat, vitamins, or minerals. Eat a variety of healthy foods including vegetables, fruits, and whole grains.  Contact a health care provider if:  · You received a tetanus shot and you have swelling, severe pain, redness, or bleeding at the injection site.  · Your pain is not controlled with medicine.  · You have redness, swelling, or pain around the wound.  · You have fluid or blood coming from the wound.  · Your wound feels warm to the touch.  · You have pus or a bad smell coming from the wound.  · You have a fever or chills.  · You are nauseous or you vomit.  · You are  dizzy.  Get help right away if:  · You have a red streak going away from your wound.  · The edges of the wound open up and separate.  · Your wound is bleeding, and the bleeding does not stop with gentle pressure.  · You have a rash.  · You faint.  · You have trouble breathing.  Summary  · Always wash your hands with soap and water before changing your bandage (dressing).  · To help with healing, eat foods that are rich in protein, vitamin A, vitamin C, and other nutrients.  · Check your wound every day for signs of infection. Contact your health care provider if you suspect that your wound is infected.  This information is not intended to replace advice given to you by your health care provider. Make sure you discuss any questions you have with your health care provider.  Document Released: 09/26/2009 Document Revised: 04/06/2020 Document Reviewed: 07/04/2017  Elsevier Patient Education © 2020 [employer]    Edema    Edema is when you have too much fluid in your body or under your skin. Edema may make your legs, feet, and ankles swell up. Swelling is also common in looser tissues, like around your eyes. This is a common condition. It gets more common as you get older. There are many possible causes of edema. Eating too much salt (sodium) and being on your feet or sitting for a long time can cause edema in your legs, feet, and ankles. Hot weather may make edema worse.  Edema is usually painless. Your skin may look swollen or shiny.  Follow these instructions at home:  · Keep the swollen body part raised (elevated) above the level of your heart when you are sitting or lying down.  · Do not sit still or stand for a long time.  · Do not wear tight clothes. Do not wear garters on your upper legs.  · Exercise your legs. This can help the swelling go down.  · Wear elastic bandages or support stockings as told by your doctor.  · Eat a low-salt (low-sodium) diet to reduce fluid as told by your doctor.  · Depending on the  cause of your swelling, you may need to limit how much fluid you drink (fluid restriction).  · Take over-the-counter and prescription medicines only as told by your doctor.  Contact a doctor if:  · Treatment is not working.  · You have heart, liver, or kidney disease and have symptoms of edema.  · You have sudden and unexplained weight gain.  Get help right away if:  · You have shortness of breath or chest pain.  · You cannot breathe when you lie down.  · You have pain, redness, or warmth in the swollen areas.  · You have heart, liver, or kidney disease and get edema all of a sudden.  · You have a fever and your symptoms get worse all of a sudden.  Summary  · Edema is when you have too much fluid in your body or under your skin.  · Edema may make your legs, feet, and ankles swell up. Swelling is also common in looser tissues, like around your eyes.  · Raise (elevate) the swollen body part above the level of your heart when you are sitting or lying down.  · Follow your doctor's instructions about diet and how much fluid you can drink (fluid restriction).  This information is not intended to replace advice given to you by your health care provider. Make sure you discuss any questions you have with your health care provider.  Document Released: 06/05/2009 Document Revised: 12/21/2018 Document Reviewed: 01/05/2018  Elsevier Patient Education © 2020 [employer] Inc.

## 2022-04-12 NOTE — CARE PLAN
The patient is Stable - Low risk of patient condition declining or worsening    Shift Goals  Clinical Goals: Free from falls and injury, Rest and sleep, Pain Control  Patient Goals: Free from falls and injury, Rest and sleep, Pain Control  Family Goals: Discharge in AM    Progress made toward(s) clinical / shift goals:    Problem: Pain - Standard  Goal: Alleviation of pain or a reduction in pain to the patient’s comfort goal  Outcome: Progressing     Problem: Knowledge Deficit - Standard  Goal: Patient and family/care givers will demonstrate understanding of plan of care, disease process/condition, diagnostic tests and medications  Outcome: Progressing     Problem: Hemodynamics  Goal: Patient's hemodynamics, fluid balance and neurologic status will be stable or improve  Outcome: Progressing     Problem: Fluid Volume  Goal: Fluid volume balance will be maintained  Outcome: Progressing     Problem: Urinary - Renal Perfusion  Goal: Ability to achieve and maintain adequate renal perfusion and functioning will improve  Outcome: Progressing     Problem: Respiratory  Goal: Patient will achieve/maintain optimum respiratory ventilation and gas exchange  Outcome: Progressing     Problem: Mechanical Ventilation  Goal: Safe management of artificial airway and ventilation  Outcome: Progressing  Goal: Successful weaning off mechanical ventilator, spontaneously maintains adequate gas exchange  Outcome: Progressing  Goal: Patient will be able to express needs and understand communication  Outcome: Progressing     Problem: Physical Regulation  Goal: Diagnostic test results will improve  Outcome: Progressing  Goal: Signs and symptoms of infection will decrease  Outcome: Progressing     Problem: Fall Risk  Goal: Patient will remain free from falls  Outcome: Progressing     Problem: Skin Integrity  Goal: Skin integrity is maintained or improved  Outcome: Progressing     Problem: Discharge Barriers/Planning  Goal: Patient's continuum of  care needs are met  Outcome: Progressing       Patient is not progressing towards the following goals:

## 2022-04-12 NOTE — WOUND TEAM
"[facility]  Inpatient Services  Initial Wound and Skin Care Evaluation    Admission Date: 4/3/2022     Last order of IP CONSULT TO WOUND CARE was found on 4/3/2022 from Hospital Encounter on 4/3/2022     HPI, PMH, SH: Reviewed    Past Surgical History:   Procedure Laterality Date   • HIP ARTHROPLASTY TOTAL Left 2/13/2019    Procedure: HIP ARTHROPLASTY TOTAL, Cabling of intra-operative calcar fracture;  Surgeon: Bryon Levine M.D.;  Location: [facility];  Service: Orthopedics   • CERVICAL FUSION POSTERIOR  12/7/2018    Procedure: CERVICAL FUSION POSTERIOR- STAGE #2 C3-5 AND C3-T1;  Surgeon: Emre Mendoza III, M.D.;  Location: [facility];  Service: Neurosurgery   • CERVICAL LAMINECTOMY POSTERIOR  12/7/2018    Procedure: CERVICAL LAMINECTOMY POSTERIOR C2-T1;  Surgeon: Emre Mendoza III, M.D.;  Location: [facility];  Service: Neurosurgery   • CERVICAL DISK AND FUSION ANTERIOR  12/5/2018    Procedure: CERVICAL DISK AND FUSION ANTERIOR-STAGE #1 C4-5;  Surgeon: Emre Mendoza III, M.D.;  Location: [facility];  Service: Neurosurgery   • CORPECTOMY  12/5/2018    Procedure: CORPECTOMY;  Surgeon: Emre Mendoza III, M.D.;  Location: [facility];  Service: Neurosurgery   • HIP ARTHROPLASTY TOTAL Right 3/20/2018    Procedure: HIP ARTHROPLASTY TOTAL;  Surgeon: Bryon Levine M.D.;  Location: [facility];  Service: Orthopedics   • KNEE ARTHROPLASTY TOTAL Right 10/20/2015    Procedure: KNEE ARTHROPLASTY TOTAL;  Surgeon: Bryon Levine M.D.;  Location: SUR[facility];  Service:    • APPENDECTOMY LAPAROSCOPIC  3/21/2013    Performed by Dali Queen M.D. at [facility]   • DEBRIDEMENT  5/17/2012    Performed by BRADLEY DYKES at [facility]   • PLASTIC SURGERY  2004    excess skin on arms and legs removed   • MAMMOPLASTY AUGMENTATION Bilateral 2004    breast implants and \"tummy tuck\"   • GASTRIC " "BYPASS LAPAROSCOPIC  2002    x 2   • ABDOMINAL EXPLORATION     • OTHER      breast augmentation 2004   • OTHER      Gastric bypass 2002     Social History     Tobacco Use   • Smoking status: Never Smoker   • Smokeless tobacco: Never Used   Substance Use Topics   • Alcohol use: Not Currently     Comment: 3-4 per week; pt stops alcohol use 1-week ago     Chief Complaint   Patient presents with   • Leg Pain     R LOWER LEG  HAS WOUND ON IT W/ WOUND VAC IN PLACE  DOG STEPPED ON SAME LEG  NOW HAVING PAIN     Diagnosis: Cellulitis [L03.90]    Unit where seen by Wound Team: 2211/01     WOUND CONSULT/FOLLOW UP RELATED TO:  RLE     WOUND HISTORY:  Per H&P: \"Karine Ovalle is a 56 y.o. female with a past medical history of bilateral lower extremity lymphedema, mild intermittent asthma with chronic pain who presented 4/3/2022 with worsening right lower extremity pain and swelling.  Patient reports that the pain is severe rated as 10 out of 10.  No radiation to other places.  Pain is worse with attempting to move her extremity.  She denies having fevers but reports having intermittent chills.  Denies cough and shortness of breath cough.\"    WOUND ASSESSMENT/LDA                         4/4/22 4/8/22 4/11/22     Wound 01/11/22 Right Anterior LE (Active)   Wound Image      Site Assessment Red;Yellow    Periwound Assessment Edema    Margins Defined edges    Closure Secondary intention    Drainage Amount Scant    Drainage Description Serosanguineous    Treatments Cleansed;CSWD - Conservative Sharp Wound Debridement    Wound Cleansing Normal Saline Irrigation    Periwound Protectant Barrier Paste    Dressing Cleansing/Solutions Not Applicable    Dressing Options Honey Colloid;Mepilex    Dressing Changed Changed    Dressing Status Intact    Dressing Change/Treatment Frequency Every 48 hrs, and As Needed    NEXT Dressing Change/Treatment Date " 04/13/22    NEXT Weekly Photo (Inpatient Only) 04/18/22    Non-staged Wound Description Full thickness 04/11/22 1515   Wound Length (cm) 8.5 cm 04/11/22 1515   Wound Width (cm) 5.5 cm 04/11/22 1515   Wound Depth (cm) 0.7 cm 04/11/22 1515   Wound Surface Area (cm^2) 46.75 cm^2 04/11/22 1515   Wound Volume (cm^3) 32.725 cm^3 04/11/22 1515   Wound Healing % -483 04/11/22 1515   Shape irregular anterior, lateral circular    Wound Odor None    Pulses 1+;DP    Exposed Structures Adipose        Vascular:    EVELYNE:   No results found.    Lab Values:    Lab Results   Component Value Date/Time    WBC 25.2 (H) 04/11/2022 01:55 AM    WBC 8.2 08/14/2010 12:00 AM    RBC 3.38 (L) 04/11/2022 01:55 AM    RBC 4.75 08/14/2010 12:00 AM    HEMOGLOBIN 9.4 (L) 04/11/2022 01:55 AM    HEMATOCRIT 27.0 (L) 04/11/2022 01:55 AM    CREACTPROT 26.00 (H) 04/06/2022 02:30 AM    SEDRATEWES >140 (H) 04/06/2022 02:30 AM    HBA1C 5.1 07/26/2021 11:14 AM        Culture Results show:  Recent Results (from the past 720 hour(s))   CULTURE WOUND W/ GRAM STAIN    Collection Time: 04/03/22  5:00 PM    Specimen: Right Leg; Wound   Result Value Ref Range    Significant Indicator POS (POS)     Source WND     Site RIGHT LEG     Culture Result - (A)     Gram Stain Result       Few WBCs.  Many Gram positive cocci.  Many Gram negative rods.  Few Gram positive rods.      Culture Result Streptococcus pyogenes (Group A)  Heavy growth   (A)     Culture Result Escherichia coli  Heavy growth   (A)     Culture Result Morganella morganii  Heavy growth   (A)        Susceptibility    Escherichia coli - JAKOB     Ampicillin >16 Resistant mcg/mL     Ceftriaxone <=1 Sensitive mcg/mL     Cefazolin 4 Intermediate mcg/mL     Ciprofloxacin <=0.25 Sensitive mcg/mL     Cefepime <=2 Sensitive mcg/mL     Cefuroxime <=4 Sensitive mcg/mL     Ampicillin/sulbactam 16/8 Intermediate mcg/mL     Ertapenem <=0.5 Sensitive mcg/mL     Gentamicin >8 Resistant mcg/mL     Minocycline <=4 Sensitive  mcg/mL     Moxifloxacin <=2 Sensitive mcg/mL     Pip/Tazobactam <=8 Sensitive mcg/mL     Trimeth/Sulfa >2/38 Resistant mcg/mL     Tigecycline <=2 Sensitive mcg/mL     Tobramycin 4 Sensitive mcg/mL    Morganella morganii - JAKOB     Ampicillin >16 Resistant mcg/mL     Ceftriaxone <=1 Sensitive mcg/mL     Cefazolin >16 Resistant mcg/mL     Ciprofloxacin <=0.25 Sensitive mcg/mL     Cefepime <=2 Sensitive mcg/mL     Cefuroxime >16 Resistant mcg/mL     Ampicillin/sulbactam 16/8 Intermediate mcg/mL     Ertapenem <=0.5 Sensitive mcg/mL     Gentamicin <=2 Sensitive mcg/mL     Minocycline <=4 Sensitive mcg/mL     Moxifloxacin <=2 Sensitive mcg/mL     Pip/Tazobactam <=8 Sensitive mcg/mL     Trimeth/Sulfa <=0.5/9.5 Sensitive mcg/mL     Tigecycline <=2 Resistant mcg/mL     Tobramycin <=2 Sensitive mcg/mL       Pain Level/Medicated:  Tolerated Tx with some pain with drsg removal       INTERVENTIONS BY WOUND TEAM:  Chart and images reviewed. Discussed with bedside RN. All areas of concern (based on picture review, LDA review and discussion with bedside RN) have been thoroughly assessed. Documentation of areas based on significant findings. This RN in to assess patient. Performed standard wound care which includes appropriate positioning, dressing removal and non-selective debridement. Pictures and measurements obtained weekly if/when required.  Preparation for Dressing removal: Dressing soaked with NS  Non-selectively Debrided with:  NS and gauze.  Sharp debridement: Using curette and scissors with forceps removed all black/brown tissue from wound bed revealing red and yellow tissue. Pt had 3/10 pain with some bleeding with drsg removal, none from debridement.   Milagro wound: Cleansed with NS, already has barrier paste to periwound, removed some.  Primary Dressing: honey colloid cut to fit wound  Secondary (Outer) Dressing: mepilex    Interdisciplinary consultation: Patient, Bedside RN     EVALUATION / RATIONALE FOR TREATMENT:  Most  Recent Date:  4/11: Wound bed has decreased non-viable tissue. Edema is decreased. Wound depth and size is increased. Dakins made wound  and gave ability to remove non-viable tissue. Changed to honey colloid with Mepilex since pt is to DC. Honey colloid applied to cleanse and autolytically debride due to its high osmolarity. As well as help lower overall wound pH, while promoting a moisture-balanced environment conducive to wound healing.    4/8 Assessed pt d/t to concern for worsening infection. Site appears similar to previous assessment on 4/4. Recommend continuation of Dakins dressings qshift. Wound team to continue following and assess progression and possible reapplication of wound vac prior to DC. May need veraflo if slough remains adhered.    4/4: Pt's wounds have odor and appear worse than picture last taken at outpt wound clinic 3/24. There is yellow and brown/tan tissue to wound bed,  red edges. Dakin's Solution® Quarter Strength is a broad-spectrum antimicrobial cleanser that is gentle to the skin. Effective against MRSA, VRE, other bacteria, viruses, molds, fungi and yeast. Also used for odor control. It helps debride chemically and mechanically with drsg removal.    There is a wound to her medial posterior ankle with scant bleeding. Unable to fully assess r/t  inability to raise leg to visualize wound. Applied heel mepilex and RN to apply heel float boot.     Goals: Steady decrease in non-viable tissue in wound bed weekly.    WOUND TEAM PLAN OF CARE ([X] for frequency of wound follow up,):   Nursing to follow orders written for wound care. Contact wound team if area fails to progress, deteriorates or with any questions/concerns  Dressing changes by wound team:                   Follow up 3 times weekly:                NPWT change 3 times weekly:     Follow up 1-2 times weekly:  x    Follow up Bi-Monthly:                   Follow up as needed:     Other (explain):     NURSING PLAN OF CARE ORDERS  (X):  Dressing changes: See Dressing Care orders: x  Skin care: See Skin Care orders:   RN Prevention Protocol: x  Rectal tube care: See Rectal Tube Care orders:   Other orders:         Anticipated discharge plans:   LTACH:        SNF/Rehab:                  Home Health Care:   x        Outpatient Wound Center: referral placed           Self/Family Care:        Other:           Vac Discharge Needs:  Already has home machine in room   Not Applicable Pt not on a wound vac:   Not until wound bed more viable    Regular Vac while inpatient, alternative dressing at DC:        Regular Vac in use and continued at DC:         Reg. Vac w/ Skin Sub/Biologic in use. Will need to be changed 2x wkly:      Veraflo Vac while inpatient, ok to transition to Regular Vac on Discharge:           Veraflo Vac while inpatient, will need to remain on Veraflo Vac upon discharge:

## 2022-04-12 NOTE — DISCHARGE PLANNING
Agency/Facility Name: [facility]  Spoke To: Blank  Outcome: Per Blank, pt requested a smaller wheelchair, but facility cannot provide.   RN JB notified

## 2022-04-12 NOTE — PROGRESS NOTES
Dr. Norwood aware that patient is staying overnight. Unsafe discharge home, boyfriend/caregiver unavailable until tomorrow 9:30 AM.

## 2022-04-12 NOTE — CARE PLAN
The patient is Stable - Low risk of patient condition declining or worsening    Shift Goals  Clinical Goals: Pain control  Patient Goals: Pain control  Family Goals: Discharge in AM    Progress made toward(s) clinical / shift goals:  Pain at tolerable level with PRN medication.     Problem: Pain - Standard  Goal: Alleviation of pain or a reduction in pain to the patient’s comfort goal  Outcome: Progressing     Problem: Knowledge Deficit - Standard  Goal: Patient and family/care givers will demonstrate understanding of plan of care, disease process/condition, diagnostic tests and medications  Outcome: Progressing     Problem: Hemodynamics  Goal: Patient's hemodynamics, fluid balance and neurologic status will be stable or improve  Outcome: Progressing       Patient is not progressing towards the following goals:

## 2022-04-12 NOTE — DISCHARGE PLANNING
Spoke to Rufus from [facility] via phone call. Per [facility] DME wheechair was delivered to pt at bedside yesterday but pt refused it.

## 2022-04-12 NOTE — DISCHARGE SUMMARY
Discharge Summary    CHIEF COMPLAINT ON ADMISSION  Chief Complaint   Patient presents with   • Leg Pain     R LOWER LEG  HAS WOUND ON IT W/ WOUND VAC IN PLACE  DOG STEPPED ON SAME LEG  NOW HAVING PAIN       Reason for Admission  Leg Infection     Admission Date  4/3/2022    CODE STATUS  Full Code    HPI & HOSPITAL COURSE  Karine Ovlale is a 56 y.o. female with a past medical history of bilateral lower extremity lymphedema, mild intermittent asthma with chronic back pain who presented 4/3/2022 with worsening right lower extremity pain and swelling.  The patient states that she burned her anterior aspect of the right leg at the end of December.  Subsequently she has had an open wound followed by wound care.  She was seen by wound care on 24 March and I reviewed the note and the wound care nurse noted the patient was noncompliant did remove the dressing.  She did have an increase in interval size of her ulceration from prior.  She is not currently on antibiotics.  She states she has not had any associated fevers.  She states that her dog stepped on her leg and she had increased pain since that time.  She has had increased swelling as well.      Wound culture obtained during hospitalization showed a was growing E. coli, Morganella morganii, strep pyogenes.  Patient improved with Rocephin.  She will discharge with Omnicef.  Patient has had difficulty ambulating due to pain and had been recommended to go to SNF but she refused.  Home health and wound care ordered for the patient.  Also walker and wheelchair ordered for the patient.  Patient is chronically on pain meds and resume the current regiment when she discharged home, no opiates provided for the patient.      Therefore, she is discharged in good and stable condition to home with organized home healthcare and close outpatient follow-up.    The patient met 2-midnight criteria for an inpatient stay at the time of discharge.    Discharge Date  4/12/22    FOLLOW  UP ITEMS POST DISCHARGE  Leg wound    DISCHARGE DIAGNOSES  Principal Problem:    Sepsis (HCC) POA: Yes  Active Problems:    Hyponatremia POA: Yes    Essential hypertension POA: Yes    Morbid obesity with BMI of 40.0-44.9, adult (HCC) POA: Yes    Edema POA: Yes    Leg edema POA: Yes    Cellulitis POA: Yes    Leukocytosis POA: Yes    Open wound of right lower extremity POA: Unknown  Resolved Problems:    * No resolved hospital problems. *      FOLLOW UP  Future Appointments   Date Time Provider Department Center   8/29/2022 10:00 AM Micky Rubin M.D. [facility] [address]  [place] [address]  [phone]        Micky Rubin M.D.  [address]  [place] [id number] [phone]    Schedule an appointment as soon as possible for a visit        MEDICATIONS ON DISCHARGE     Medication List      START taking these medications      Instructions   cefdinir 300 MG Caps  Commonly known as: OMNICEF   Take 1 Capsule by mouth 2 times a day for 7 days.  Dose: 300 mg        CHANGE how you take these medications      Instructions   phentermine 37.5 MG capsule  What changed: Another medication with the same name was removed. Continue taking this medication, and follow the directions you see here.   Doctor's comments: Needs office visit every 3 months for refill  Take 1 Capsule by mouth every morning for 90 days.  Dose: 37.5 mg        CONTINUE taking these medications      Instructions   Aleve 220 MG Caps  Generic drug: Naproxen Sodium   Take 660 mg by mouth 2 times a day as needed (For pain).  Dose: 660 mg     CALCIUM CARBONATE-VITAMIN D PO   Take 1 Tablet by mouth every day. Indications: Low Amount of Calcium in the Blood  Dose: 1 Tablet     ferrous sulfate 325 (65 Fe) MG tablet   Take 1 Tab by mouth every morning with breakfast.  Dose: 325 mg     Flovent HFA 44 MCG/ACT Aero  Generic drug: fluticasone   Inhale 2 Puffs 2 times a day. Everyday maintenance steroid  inhaler  Dose: 2 Puff     furosemide 40 MG Tabs  Commonly known as: LASIX   Take 1 Tablet by mouth every day. Indications: Edema  Dose: 40 mg     gabapentin 600 MG tablet  Commonly known as: NEURONTIN   Take 1 tablet by mouth 4 times daily     ipratropium-albuterol 0.5-2.5 (3) MG/3ML nebulizer solution  Commonly known as: DUONEB   Take 3 mL by nebulization every 6 hours as needed for Shortness of Breath.  Dose: 3 mL     methocarbamol 500 MG Tabs  Commonly known as: ROBAXIN   Take 2 Tablets by mouth 4 times a day as needed.  Dose: 1,000 mg     morphine ER 15 MG Tbcr tablet  Commonly known as: MS CONTIN   Take 15 mg by mouth every 12 hours.  Dose: 15 mg     ONE-A-DAY WOMENS 50 PLUS PO   Doctor's comments: supplement  Take 1 tablet by mouth every day.  Dose: 1 tablet      oxyCODONE-acetaminophen  MG Tabs  Commonly known as: PERCOCET-10   Take 1 Tablet by mouth 4 times a day.  Dose: 1 Tablet     potassium chloride SA 20 MEQ Tbcr  Commonly known as: Kdur   Take 1 Tablet by mouth every day.  Dose: 20 mEq     ProAir RespiClick 108 (90 Base) MCG/ACT Aepb  Generic drug: Albuterol Sulfate   Inhale 1 Puff every 6 hours as needed (Wheezing).  Dose: 1 Puff     VITAMIN D PO   Take 1 Capsule by mouth every day.  Dose: 1 Capsule            Allergies  Allergies   Allergen Reactions   • Tobacco [Nicotiana Tabacum] Shortness of Breath     Cigarette smoke causes SOB, rispatory issues       DIET  Orders Placed This Encounter   Procedures   • Diet Order Diet: 2 Gram Sodium (restriction, please chop meat)     Standing Status:   Standing     Number of Occurrences:   1     Order Specific Question:   Diet:     Answer:   2 Gram Sodium [7]     Comments:   restriction, please chop meat       ACTIVITY  As tolerated.  Weight bearing as tolerated    CONSULTATIONS  Ortho-Dr Kellogg  PM&R Dr Rock    PROCEDURES  none    LABORATORY  Lab Results   Component Value Date    SODIUM 130 (L) 04/11/2022    POTASSIUM 3.8 04/11/2022    CHLORIDE  92 (L) 04/11/2022    CO2 32 04/11/2022    GLUCOSE 89 04/11/2022    BUN 8 04/11/2022    CREATININE 0.43 (L) 04/11/2022    CREATININE 0.88 08/14/2010    GLOMRATE >59 08/14/2010        Lab Results   Component Value Date    WBC 25.2 (H) 04/11/2022    WBC 8.2 08/14/2010    HEMOGLOBIN 9.4 (L) 04/11/2022    HEMATOCRIT 27.0 (L) 04/11/2022    PLATELETCT 555 (H) 04/11/2022        Total time of the discharge process exceeds 34 minutes.

## 2022-04-14 ENCOUNTER — OFFICE VISIT (OUTPATIENT)
Dept: WOUND CARE | Facility: MEDICAL CENTER | Age: 57
End: 2022-04-14
Attending: STUDENT IN AN ORGANIZED HEALTH CARE EDUCATION/TRAINING PROGRAM
Payer: COMMERCIAL

## 2022-04-14 VITALS
SYSTOLIC BLOOD PRESSURE: 98 MMHG | OXYGEN SATURATION: 100 % | HEART RATE: 64 BPM | DIASTOLIC BLOOD PRESSURE: 72 MMHG | TEMPERATURE: 97.1 F | RESPIRATION RATE: 18 BRPM

## 2022-04-14 DIAGNOSIS — T24.331D FULL THICKNESS BURN OF RIGHT LOWER LEG, SUBSEQUENT ENCOUNTER: ICD-10-CM

## 2022-04-14 DIAGNOSIS — I89.0 LYMPHEDEMA: ICD-10-CM

## 2022-04-14 DIAGNOSIS — R89.5 POSITIVE CULTURE FINDINGS IN WOUND: ICD-10-CM

## 2022-04-14 PROCEDURE — 99214 OFFICE O/P EST MOD 30 MIN: CPT

## 2022-04-14 PROCEDURE — 16020 DRESS/DEBRID P-THICK BURN S: CPT | Performed by: NURSE PRACTITIONER

## 2022-04-14 PROCEDURE — 11045 DBRDMT SUBQ TISS EACH ADDL: CPT

## 2022-04-14 PROCEDURE — 97606 NEG PRS WND THER DME>50 SQCM: CPT

## 2022-04-14 PROCEDURE — 11042 DBRDMT SUBQ TIS 1ST 20SQCM/<: CPT

## 2022-04-14 PROCEDURE — 99214 OFFICE O/P EST MOD 30 MIN: CPT | Mod: 25 | Performed by: NURSE PRACTITIONER

## 2022-04-14 ASSESSMENT — ENCOUNTER SYMPTOMS
PALPITATIONS: 0
VOMITING: 0
HEADACHES: 0
CHILLS: 0
DIZZINESS: 0
DIARRHEA: 0
SHORTNESS OF BREATH: 0
ROS SKIN COMMENTS: FULL-THICKNESS WOUND RIGHT ANTERIOR LOWER EXTREMITY
DOUBLE VISION: 0
BLURRED VISION: 0
COUGH: 0
NAUSEA: 0
CONSTIPATION: 0
WHEEZING: 0
FEVER: 0

## 2022-04-15 NOTE — PATIENT INSTRUCTIONS
-Keep your wound dressing clean, dry, and intact.    -Wound vac may not have any drainage in tube or cannister & it will still be working.   Change cannister if it does become full by pressing tab on side of machine to remove canister and snap on new one. Full canister can be thrown in the trash. If cannister fills with bright red blood - go to ER. Dressing will be changed every MWF at the wound clini.  If you are having issues with your wound VAC, please consider patching leaks, changing the canister, or calling [phone] for troubleshooting. If the wound VAC has been off or un-operational for over 2 hours, call wound care center to inform them and remove all dressings including black foam and replace with normal saline damp gauze.     -Should you experience any significant changes in your wound(s), such as infection (redness, swelling, localized heat, increased pain, fever > 101 F, chills) or have any questions regarding your home care instructions, please contact the wound center at [phone]. If after hours, contact your primary care physician or go to the hospital emergency room.
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
## 2022-04-18 ENCOUNTER — APPOINTMENT (OUTPATIENT)
Dept: PHYSICAL THERAPY | Facility: REHABILITATION | Age: 57
End: 2022-04-18
Attending: NURSE PRACTITIONER
Payer: COMMERCIAL

## 2022-04-20 ENCOUNTER — OFFICE VISIT (OUTPATIENT)
Dept: WOUND CARE | Facility: MEDICAL CENTER | Age: 57
End: 2022-04-20
Attending: STUDENT IN AN ORGANIZED HEALTH CARE EDUCATION/TRAINING PROGRAM
Payer: COMMERCIAL

## 2022-04-20 VITALS
TEMPERATURE: 98 F | RESPIRATION RATE: 19 BRPM | OXYGEN SATURATION: 90 % | DIASTOLIC BLOOD PRESSURE: 68 MMHG | HEART RATE: 117 BPM | SYSTOLIC BLOOD PRESSURE: 106 MMHG

## 2022-04-20 DIAGNOSIS — I89.0 LYMPHEDEMA: ICD-10-CM

## 2022-04-20 DIAGNOSIS — T24.331D FULL THICKNESS BURN OF RIGHT LOWER LEG, SUBSEQUENT ENCOUNTER: Primary | ICD-10-CM

## 2022-04-20 DIAGNOSIS — R89.5 POSITIVE CULTURE FINDINGS IN WOUND: ICD-10-CM

## 2022-04-20 PROCEDURE — 99213 OFFICE O/P EST LOW 20 MIN: CPT

## 2022-04-20 PROCEDURE — 99213 OFFICE O/P EST LOW 20 MIN: CPT | Mod: 25 | Performed by: STUDENT IN AN ORGANIZED HEALTH CARE EDUCATION/TRAINING PROGRAM

## 2022-04-20 PROCEDURE — 11045 DBRDMT SUBQ TISS EACH ADDL: CPT

## 2022-04-20 PROCEDURE — 11042 DBRDMT SUBQ TIS 1ST 20SQCM/<: CPT

## 2022-04-20 PROCEDURE — 16020 DRESS/DEBRID P-THICK BURN S: CPT | Performed by: STUDENT IN AN ORGANIZED HEALTH CARE EDUCATION/TRAINING PROGRAM

## 2022-04-20 ASSESSMENT — ENCOUNTER SYMPTOMS
CONSTIPATION: 0
PALPITATIONS: 0
CHILLS: 0
COUGH: 0
FEVER: 0
DIZZINESS: 0
VOMITING: 0
HEADACHES: 0
WHEEZING: 0
SHORTNESS OF BREATH: 0
NAUSEA: 0
ROS SKIN COMMENTS: FULL-THICKNESS WOUND RIGHT ANTERIOR LOWER EXTREMITY
DIARRHEA: 0
DOUBLE VISION: 0
BLURRED VISION: 0

## 2022-04-20 NOTE — PATIENT INSTRUCTIONS
After Visit Summary Wound Care Instructions    Nutrition - Patient instructed increased protein diet unless contraindicated in renal failure (meat, eggs, fish, yogurt, cottage cheese, beans), use of multivitamin with minerals and Arginaid supplementation (check if ok with Primary Care Provider first).    Infection -  instructed signs and symptoms of infection, increased redness and swelling, localized heat over wound and surrounding area/fever/chills/nausea and vomiting, when to call doctor or go to Emergency Room.     Dressing Changes - Home Health to change dressings 2 x week     Instructed patient about fall prevention.      Diabetic Instruction - Patient instructed keep tight control over Blood Sugar, <140 for optimal wound healing; Implications of loss of protective sensation (LOPS) discussed with patient, including increased risk for amputation.  Advised to check feet at least daily, moisturize feet, and to always wear protective foot wear, arrange meticulous regular foot care by podiatrist or Certified Foot Care Nurse ([facility]). Patient with good understanding.       Compression Wraps - Avoid prolonged standing or sitting without elevating your legs.  Apply tubigrip to your legs ending 2 fingers below back of knee without wrinkles.   Knee-high gradient compression stockings to both lower legs  Multilayer compression wrap to R leg. Do not get wet and keep on for the week. Only remove if temperature or sensation changes.   Remove your compression wrap if you have severe pain, severe swelling, numbness, color change, or temperature change in your toes. If you need to remove your compression wrap, do so by unrolling it. Do not cut the compression wrap off to prevent cutting yourself on accident.    Wound VAC may not have any drainage in tube or canister & it will still be working.   Change canister if it does become full by pressing tab on side of machine to remove canister and snap on new one. Full canister can  be thrown in the trash. If canister fills with bright red blood, turn machine off and go to Emergency Room immediately. Dressing will be changed every Monday, Wednesday and Friday at the wound clinic.  If you are having issues with your wound VAC, please consider patching leaks, changing the canister, or calling [phone] for troubleshooting. If the wound VAC has been off or un-operational for over 2 hours, call wound care center to inform them and remove all dressings including black foam and replace with normal saline damp gauze, then dry gauze over that, secure with tape.     Questions - should you have any questions regarding your home care instructions, please contact the wound center at [phone]. If after hours, contact your primary care physician or go to the hospital emergency room.
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
## 2022-04-25 ENCOUNTER — TELEPHONE (OUTPATIENT)
Dept: MEDICAL GROUP | Age: 57
End: 2022-04-25
Payer: COMMERCIAL

## 2022-04-25 ENCOUNTER — OFFICE VISIT (OUTPATIENT)
Dept: MEDICAL GROUP | Age: 57
End: 2022-04-25
Payer: COMMERCIAL

## 2022-04-25 ENCOUNTER — TELEPHONE (OUTPATIENT)
Dept: WOUND CARE | Facility: MEDICAL CENTER | Age: 57
End: 2022-04-25
Payer: COMMERCIAL

## 2022-04-25 VITALS
OXYGEN SATURATION: 98 % | HEIGHT: 63 IN | TEMPERATURE: 98 F | SYSTOLIC BLOOD PRESSURE: 132 MMHG | BODY MASS INDEX: 35.44 KG/M2 | DIASTOLIC BLOOD PRESSURE: 76 MMHG | HEART RATE: 107 BPM | RESPIRATION RATE: 16 BRPM | WEIGHT: 200 LBS

## 2022-04-25 DIAGNOSIS — G89.29 CHRONIC BILATERAL LOW BACK PAIN WITH BILATERAL SCIATICA: ICD-10-CM

## 2022-04-25 DIAGNOSIS — M54.41 CHRONIC BILATERAL LOW BACK PAIN WITH BILATERAL SCIATICA: ICD-10-CM

## 2022-04-25 DIAGNOSIS — M54.42 CHRONIC BILATERAL LOW BACK PAIN WITH BILATERAL SCIATICA: ICD-10-CM

## 2022-04-25 DIAGNOSIS — M50.30 DDD (DEGENERATIVE DISC DISEASE), CERVICAL: ICD-10-CM

## 2022-04-25 DIAGNOSIS — M51.36 DDD (DEGENERATIVE DISC DISEASE), LUMBAR: ICD-10-CM

## 2022-04-25 PROCEDURE — 99214 OFFICE O/P EST MOD 30 MIN: CPT | Performed by: INTERNAL MEDICINE

## 2022-04-25 RX ORDER — GABAPENTIN 800 MG/1
800 TABLET ORAL 4 TIMES DAILY
Qty: 360 TABLET | Refills: 3 | Status: SHIPPED | OUTPATIENT
Start: 2022-04-25 | End: 2022-04-26 | Stop reason: SDUPTHER

## 2022-04-25 RX ORDER — METHOCARBAMOL 500 MG/1
1000 TABLET, FILM COATED ORAL 4 TIMES DAILY PRN
Qty: 240 TABLET | Refills: 5 | Status: SHIPPED | OUTPATIENT
Start: 2022-04-25 | End: 2022-04-26 | Stop reason: SDUPTHER

## 2022-04-25 ASSESSMENT — FIBROSIS 4 INDEX: FIB4 SCORE: 0.76

## 2022-04-25 NOTE — TELEPHONE ENCOUNTER
During patient's appointment today, she asked if her gabapentin could be increased to 800 mg 4 times daily and her Robaxin to 750 mg tabs which would equate to 1500 mg 4 times a day as needed.    I did not pend these for you, as I do not know the increase in dosage is appropriate.  If so please order for patient.

## 2022-04-25 NOTE — TELEPHONE ENCOUNTER
Telephone call from Karine that she had to remove wound vac yesterday due to blockage alert, unable to clear with changing cannister. Advised to describe to LEXI Maynard RN today at visit . Discussed making sure the whole is cut large enough to accommodate tract pad. Aleyda to visit today.

## 2022-04-26 NOTE — PROGRESS NOTES
Patient came in today for [facility] paperwork renewal.  Her needs were discussed and all paperwork signed.  Please refer to that paperwork.  I will continue to follow patient as an outpatient on a monthly bimonthly or try monthly basis.    With regards to her chronic low back pain.  We increased her gabapentin to 800 mg 4 times daily but kept her Robaxin the same at 1000 mg 4 times daily as needed muscle spasms..

## 2022-04-27 ENCOUNTER — OFFICE VISIT (OUTPATIENT)
Dept: WOUND CARE | Facility: MEDICAL CENTER | Age: 57
End: 2022-04-27
Attending: STUDENT IN AN ORGANIZED HEALTH CARE EDUCATION/TRAINING PROGRAM
Payer: COMMERCIAL

## 2022-04-27 VITALS
HEART RATE: 112 BPM | SYSTOLIC BLOOD PRESSURE: 134 MMHG | TEMPERATURE: 98.5 F | DIASTOLIC BLOOD PRESSURE: 77 MMHG | OXYGEN SATURATION: 94 % | RESPIRATION RATE: 20 BRPM

## 2022-04-27 DIAGNOSIS — R89.5 POSITIVE CULTURE FINDINGS IN WOUND: ICD-10-CM

## 2022-04-27 DIAGNOSIS — I89.0 LYMPHEDEMA: ICD-10-CM

## 2022-04-27 DIAGNOSIS — T24.331D FULL THICKNESS BURN OF RIGHT LOWER LEG, SUBSEQUENT ENCOUNTER: ICD-10-CM

## 2022-04-27 PROCEDURE — 99213 OFFICE O/P EST LOW 20 MIN: CPT | Mod: 25 | Performed by: NURSE PRACTITIONER

## 2022-04-27 PROCEDURE — 11045 DBRDMT SUBQ TISS EACH ADDL: CPT

## 2022-04-27 PROCEDURE — 97605 NEG PRS WND THER DME<=50SQCM: CPT

## 2022-04-27 PROCEDURE — 11042 DBRDMT SUBQ TIS 1ST 20SQCM/<: CPT

## 2022-04-27 PROCEDURE — 16020 DRESS/DEBRID P-THICK BURN S: CPT | Performed by: NURSE PRACTITIONER

## 2022-04-27 PROCEDURE — 99213 OFFICE O/P EST LOW 20 MIN: CPT

## 2022-04-27 ASSESSMENT — ENCOUNTER SYMPTOMS
PALPITATIONS: 0
VOMITING: 0
COUGH: 0
WHEEZING: 0
DIZZINESS: 0
DOUBLE VISION: 0
ROS SKIN COMMENTS: FULL-THICKNESS WOUND RIGHT ANTERIOR LOWER EXTREMITY
DIARRHEA: 0
HEADACHES: 0
CHILLS: 0
SHORTNESS OF BREATH: 0
NAUSEA: 0
BLURRED VISION: 0
CONSTIPATION: 0
FEVER: 0

## 2022-04-27 NOTE — PATIENT INSTRUCTIONS
-Keep your wound dressing clean, dry, and intact.    -Change your dressing if it becomes soiled, soaked, or falls off.    -Remove your compression wrap if you have severe pain, severe swelling, numbness, color change, or temperature change in your toes. If you need to remove your compression wrap, do so by unrolling it. Do not cut the compression wrap off to prevent cutting yourself on accident.    -Wound vac may not have any drainage in tube or cannister & it will still be working.   Change cannister if it does become full by pressing tab on side of machine to remove canister and snap on new one. Full canister can be thrown in the trash. If cannister fills with bright red blood - go to ER. Dressing will be changed every MWF at the wound clini.  If you are having issues with your wound VAC, please consider patching leaks, changing the canister, or calling [phone] for troubleshooting. If the wound VAC has been off or un-operational for over 2 hours, call wound care center to inform them and remove all dressings including black foam and replace with normal saline damp gauze.     -Should you experience any significant changes in your wound(s), such as infection (redness, swelling, localized heat, increased pain, fever > 101 F, chills) or have any questions regarding your home care instructions, please contact the wound center at [phone]. If after hours, contact your primary care physician or go to the hospital emergency room.
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
## 2022-04-28 NOTE — ED NOTES
- H/H stable. Will continue to monitor with daily cbc.          MD was at [facility] for evaluation  Orders rec'ed   Pt aware of POC and is in NAD at present

## 2022-05-04 ENCOUNTER — OFFICE VISIT (OUTPATIENT)
Dept: WOUND CARE | Facility: MEDICAL CENTER | Age: 57
End: 2022-05-04
Attending: STUDENT IN AN ORGANIZED HEALTH CARE EDUCATION/TRAINING PROGRAM
Payer: COMMERCIAL

## 2022-05-04 DIAGNOSIS — Z91.199 NO-SHOW FOR APPOINTMENT: ICD-10-CM

## 2022-05-04 NOTE — PROGRESS NOTES
Patient arrived late for her wound care appointment today and could not be seen. Per the PARS, patient did not have a wound vac applied to the wound when she arrived late. This RN attempted to call the patient to ensure she contacted her home health nurse. This RN was unable to leave a message on the patient answering machine due to a lack of identifiers. Patients next appointment is scheduled for 05/13/22 at 1415. This RN also contacted [facility] to ensure the wounds would be addressed. Per the , the patient is on the schedule to be seen by VERO Norton today. Wounds will be addressed by [facility] today.

## 2022-05-04 NOTE — PROGRESS NOTES
Brief Wound Care Provider Note    Patient no show to clinic today. Clinic staff to reach out to patient to reschedule.

## 2022-05-12 NOTE — PROGRESS NOTES
Subjective:      Karine Ovalle is a 52 y.o. female who presents with Wound Infection  of right lower inner leg. US neg for dvt. Better Keflex and Septra  for the past 7 days.       And   The patient is here for followup of chronic medical problems listed below. The patient is compliant with medications and having no side effects from them. Denies chest pain, abdominal pain, dyspnea, myalgias, or cough.   Patient Active Problem List    Diagnosis Date Noted   • Mild intermittent asthma without complication 04/25/2012     Priority: High   • Cellulitis and abscess of leg- right lower inner 08/09/2017   • Chronic use of opiate drugs therapeutic purposes 08/09/2017   • Colon cancer screening 08/09/2017   • Healthcare maintenance-needs Pap smear and colon cancer screening 08/09/2017   • Lesion of soft tissue of lower leg and ankle 07/26/2017   • Sprain of right ankle 07/26/2017   • Swelling of ankle 07/26/2017   • Administrative encounter- FMLA paperwork fill out with pt asst 07/26/2017   • Acute right hip pain- traumatic 07/26/2017   • Acute low back pain due to trauma 07/26/2017   • Chronic pain syndrome- nv adv pain, dr sanders 06/08/2017   • Chronic use of opiate for therapeutic purpose- nv adv pain dr sanders following 06/08/2017   • Severe obesity (BMI >= 40) (CMS-HCC) 10/21/2016   • Vitamin B 12 deficiency 06/02/2016   • Weight gain status post gastric bypass- in [place] 2002; was 320 lbs- got down to 155 08/12/2014   • Fibroids 08/13/2013   • Primary osteoarthritis of right knee- sp right TKR 2015 - dr nowak 08/13/2013     No Known Allergies  Outpatient Prescriptions Prior to Visit   Medication Sig Dispense Refill   • potassium chloride ER (KLOR-CON) 10 MEQ tablet TAKE ONE TABLET BY MOUTH ONCE DAILY 30 Tab 0   • olopatadine (PATANOL) 0.1 % ophthalmic solution Place 1 Drop in both eyes 2 times a day. 5 mL 11   • oxycodone immediate-release (ROXICODONE) 5 MG Tab Take 1 Tab by mouth every 8 hours as needed  "for Severe Pain. 90 Tab 0   • phentermine 37.5 MG capsule Take 1 Cap by mouth every morning. 90 Cap 0   • cyanocobalamin (VITAMIN B12) 1000 MCG Tab Take 1 Tab by mouth every day. 90 Tab 4   • meloxicam (MOBIC) 15 MG tablet 1 tab po qday with food for djd 90 Tab 1   • furosemide (LASIX) 20 MG Tab TAKE ONE TABLET BY MOUTH ONCE DAILY 90 Tab 4   • Omega-3 Fatty Acids (FISH OIL) 1000 MG Cap capsule Take 3,000 mg by mouth every day.     • multivitamin (THERAGRAN) Tab Take 1 Tab by mouth every day.     • vitamin D (CHOLECALCIFEROL) 1000 UNIT Tab Take 1,000 Units by mouth every day.     • sulfamethoxazole-trimethoprim (BACTRIM DS) 800-160 MG tablet Take 1 Tab by mouth every 12 hours for 10 days. 20 Tab 0   • cephALEXin (KEFLEX) 500 MG Cap Take 1 Cap by mouth 4 times a day. 40 Cap 0   • Lidocaine HCl 3 % Cream Apply cream to affected area 2 times daily as needed (Patient not taking: Reported on 7/26/2017) 1 Tube 0     No facility-administered medications prior to visit.                 Wound Infection        Review of Systems   Constitutional: Negative.    HENT: Negative.    Eyes: Negative.    Respiratory: Negative.    Cardiovascular: Negative.    Gastrointestinal: Negative.    Genitourinary: Negative.    Musculoskeletal: Negative.    Skin: Negative.    Neurological: Negative.    Endo/Heme/Allergies: Negative.    Psychiatric/Behavioral: Negative.           Objective:     /80 mmHg  Pulse 98  Temp(Src) 36.3 °C (97.3 °F)  Ht 1.57 m (5' 1.81\")  Wt 100.699 kg (222 lb)  BMI 40.85 kg/m2  SpO2 99%  LMP 05/17/2017  Breastfeeding? No     Physical Exam   Constitutional: She is oriented to person, place, and time. She appears well-developed and well-nourished.   HENT:   Head: Normocephalic and atraumatic.   Right Ear: External ear normal.   Left Ear: External ear normal.   Nose: Nose normal.   Mouth/Throat: Oropharynx is clear and moist.   Eyes: Conjunctivae and EOM are normal. Pupils are equal, round, and reactive to " light. Right eye exhibits no discharge. Left eye exhibits no discharge. No scleral icterus.   Neck: Normal range of motion. Neck supple. No JVD present. No tracheal deviation present. No thyromegaly present.   Cardiovascular: Normal rate, regular rhythm, normal heart sounds and intact distal pulses.  Exam reveals no gallop and no friction rub.    Pulmonary/Chest: Effort normal and breath sounds normal. No stridor. No respiratory distress. She has no wheezes. She has no rales. She exhibits no tenderness.   Abdominal: Soft. Bowel sounds are normal. She exhibits no distension and no mass. There is no tenderness. There is no rebound and no guarding. No hernia.   Musculoskeletal: Normal range of motion. She exhibits no edema or tenderness.   Lymphadenopathy:     She has no cervical adenopathy.   Neurological: She is alert and oriented to person, place, and time. She has normal reflexes. She displays normal reflexes. No cranial nerve deficit. Coordination normal.   Skin: Skin is warm and dry. No rash noted. No erythema. No pallor.   Psychiatric: She has a normal mood and affect. Her behavior is normal. Judgment and thought content normal.   Nursing note and vitals reviewed.    Hospital Outpatient Visit on 08/09/2017   Component Date Value   • Urine Amphetamine-Metham* 08/09/2017 Positive    • Barbiturates 08/09/2017 Negative    • Benzodiazepines 08/09/2017 Negative    • Buprenorphine, Urn, Scrn 08/09/2017 Negative    • Carisoprodol, Urn, Scrn 08/09/2017 Negative    • Cocaine Metabolite 08/09/2017 Negative    • Ethyl Glucuronide Scrn, * 08/09/2017 Negative    • Fentanyl, Urn, Scrn 08/09/2017 Negative    • Meperidine, Urn, Scrn 08/09/2017 Negative    • Methadone 08/09/2017 Negative    • Opiates, Urn, Scrn 08/09/2017 Positive    • Oxycodone/Oxymorphone, U* 08/09/2017 Positive    • Phencyclidine -Pcp 08/09/2017 Negative    • Propoxyphene 08/09/2017 Negative    • Tapentadol, Urn, Scrn 08/09/2017 Negative    • Cannabinoid Metab  2017 Negative    • Tramadol, Urn, Scrn 2017 Negative    • Zolpidem, Urn, Scrn 2017 Negative    • Scrn, Urine Interpretati* 2017 See Note    • Opiates, Ur, Hydrocodone 2017 <20    • Opiates, Ur, Hydromorpho* 2017 <20    • Opiates, Ur, Codeine 2017 <20    • Opiates, Ur, Morphine 2017 <20    • Opiates, Ur, 6_AM 2017 <10    • Opiates, Ur, Noroxycodone 2017 >4000    • Opiates, Ur, Noroxymorph* 2017 223    • Opiates, Ur, Oxycodone 2017 2179    • Opiates, Ur, Oxymorphone 2017 46    • Opiates, Ur, Norhydrocod* 2017 <20    • Amphetamine, Urine 2017 <200    • Methamphetamine, Urine 2017 <200    • Methylenedioxyamphetamin* 2017 <200    • Methylenedioxymethamphet* 2017 <200    • Methylenedioxyethylamphe* 2017 <200    Admission on 2017, Discharged on 2017   Component Date Value   • Report 2017                      Value:[facility]    Test Date:  2017  Pt Name:    ANA LAWLER                  Department: ER  MRN:        [id number]                      Room:  Gender:     F                            Technician: [id number]  :        [id number]                   Requested By:ER TRIAGE PROTOCOL  Order #:    [id number]                    Reading MD: MARLY PARRA DO    Measurements  Intervals                                Axis  Rate:       98                           P:          45  NV:         160                          QRS:        -4  QRSD:       86                           T:          16  QT:         368  QTc:        470    Interpretive Statements  SINUS RHYTHM  LEFT VENTRICULAR HYPERTROPHY  Compared to ECG 2015 16:43:09  No significant changes    Electronically Signed On 2017 22:06:20 PDT by MARLY PARRA DO        Lab Results   Component Value Date/Time    GLYCOHEMOGLOBIN 5.3 2017 09:03 AM     Lab Results   Component Value  Date/Time    SODIUM 134* 06/12/2017 09:02 AM    POTASSIUM 3.9 06/12/2017 09:02 AM    CHLORIDE 102 06/12/2017 09:02 AM    CO2 26 06/12/2017 09:02 AM    GLUCOSE 96 06/12/2017 09:02 AM    BUN 11 06/12/2017 09:02 AM    CREATININE 0.75 06/12/2017 09:02 AM    BUN-CREATININE RATIO 13 08/14/2010 12:00 AM    GLOM FILT RATE, EST >59 08/14/2010 12:00 AM    ALKALINE PHOSPHATASE 100* 06/12/2017 09:02 AM    AST(SGOT) 18 06/12/2017 09:02 AM    ALT(SGPT) 10 06/12/2017 09:02 AM    TOTAL BILIRUBIN 0.7 06/12/2017 09:02 AM     Lab Results   Component Value Date/Time    INR 1.04 05/18/2012 12:20 PM     Lab Results   Component Value Date/Time    CHOLESTEROL, 06/12/2017 09:02 AM    LDL 63 06/12/2017 09:02 AM    HDL 69 06/12/2017 09:02 AM    TRIGLYCERIDES 60 06/12/2017 09:02 AM       No results found for: TESTOSTERONE  Lab Results   Component Value Date/Time    TSH 2.110 08/14/2010 12:00 AM     Lab Results   Component Value Date/Time    FREE T-4 0.72 05/03/2016 07:32 AM     Lab Results   Component Value Date/Time    URIC ACID 5.6 08/12/2014 04:49 PM     No components found for: VITB12  Lab Results   Component Value Date/Time    25-HYDROXY   VITAMIN D 25 33 08/12/2014 04:49 PM    25-HYDROXY   VITAMIN D 25 25* 07/03/2012 09:20 AM                    Assessment/Plan:     1. Cellulitis of right lower extremity-  Patient says she is about 50% better with slightly decreased pain and swelling in the right lower leg inner aspect since being on antibiotics for one week. Doppler ultrasound of the leg was negative last week. We will therefore need to extend the antibiotics for another 10 days for total of 3 weeks of therapy. The cellulitis under good control. Meantime will check an MRI to rule out any extension to the bone.     Patient seems to be overly anxious and concerned about the swelling and pain in her leg. I reassured her that it's better and that she continue to get better over the next week or 2 with continued back therapy and  referral to the wound clinic. Meantime she'll apply warm moist packs to the same area in the right lower inner leg and elevate her legs.  - MR-MRA LOWER EXTREMITY-WITH & W/O RIGHT; Future  - sulfamethoxazole-trimethoprim (BACTRIM DS) 800-160 MG tablet; Take 1 Tab by mouth every 12 hours for 10 days.  Dispense: 20 Tab; Refill: 0  - cephALEXin (KEFLEX) 500 MG Cap; Take 1 Cap by mouth 4 times a day.  Dispense: 40 Cap; Refill: 0.  - REFERRAL TO WOUND CLINIC    2. Severe obesity (BMI >= 40) (CMS-HCC)      diet/exercise/lose 15 lbs.; patient counseled          3. Chronic pain syndrome- nv adv pain, dr sanders   Under good control. Continue same regimen    4. Mild intermittent asthma without complication   Under good control. Continue same regimen    5. Colon cancer screening         - REFERRAL TO GI FOR COLONOSCOPY      40 minute face-to-face encounter took place today.  More than half of this time was spent in the coordination of care of the above problems, as well as counseling.       swelling and bruising on left face/fall

## 2022-05-13 ENCOUNTER — OFFICE VISIT (OUTPATIENT)
Dept: WOUND CARE | Facility: MEDICAL CENTER | Age: 57
End: 2022-05-13
Attending: STUDENT IN AN ORGANIZED HEALTH CARE EDUCATION/TRAINING PROGRAM
Payer: COMMERCIAL

## 2022-05-13 VITALS
SYSTOLIC BLOOD PRESSURE: 121 MMHG | OXYGEN SATURATION: 96 % | RESPIRATION RATE: 20 BRPM | TEMPERATURE: 97.6 F | DIASTOLIC BLOOD PRESSURE: 72 MMHG | HEART RATE: 113 BPM

## 2022-05-13 DIAGNOSIS — T24.331D FULL THICKNESS BURN OF RIGHT LOWER LEG, SUBSEQUENT ENCOUNTER: ICD-10-CM

## 2022-05-13 DIAGNOSIS — L08.9 WOUND INFECTION: ICD-10-CM

## 2022-05-13 DIAGNOSIS — T14.8XXA WOUND INFECTION: ICD-10-CM

## 2022-05-13 DIAGNOSIS — I89.0 LYMPHEDEMA: ICD-10-CM

## 2022-05-13 DIAGNOSIS — S81.801D OPEN WOUND OF RIGHT LOWER LEG, SUBSEQUENT ENCOUNTER: ICD-10-CM

## 2022-05-13 DIAGNOSIS — S71.101D OPEN WOUND OF RIGHT THIGH, SUBSEQUENT ENCOUNTER: ICD-10-CM

## 2022-05-13 PROCEDURE — 16020 DRESS/DEBRID P-THICK BURN S: CPT

## 2022-05-13 PROCEDURE — 99213 OFFICE O/P EST LOW 20 MIN: CPT | Mod: 25 | Performed by: NURSE PRACTITIONER

## 2022-05-13 PROCEDURE — 11042 DBRDMT SUBQ TIS 1ST 20SQCM/<: CPT | Mod: 59 | Performed by: NURSE PRACTITIONER

## 2022-05-13 PROCEDURE — 11045 DBRDMT SUBQ TISS EACH ADDL: CPT

## 2022-05-13 PROCEDURE — 11045 DBRDMT SUBQ TISS EACH ADDL: CPT | Performed by: NURSE PRACTITIONER

## 2022-05-13 PROCEDURE — 11042 DBRDMT SUBQ TIS 1ST 20SQCM/<: CPT

## 2022-05-13 PROCEDURE — 16020 DRESS/DEBRID P-THICK BURN S: CPT | Performed by: NURSE PRACTITIONER

## 2022-05-13 PROCEDURE — 99213 OFFICE O/P EST LOW 20 MIN: CPT

## 2022-05-13 ASSESSMENT — ENCOUNTER SYMPTOMS
DIARRHEA: 0
VOMITING: 0
SHORTNESS OF BREATH: 0
ROS SKIN COMMENTS: FULL-THICKNESS WOUND RIGHT ANTERIOR LOWER EXTREMITY
NAUSEA: 0
DIZZINESS: 0
HEADACHES: 0
CHILLS: 0
WHEEZING: 0
PALPITATIONS: 0
FEVER: 0
CONSTIPATION: 0
BLURRED VISION: 0
COUGH: 0
DOUBLE VISION: 0

## 2022-05-13 NOTE — PATIENT INSTRUCTIONS
Avoid prolonged standing or sitting without elevating your legs.    - Multilayer compression wrap to right leg. Do not get wet and keep on for the week. Only remove if temperature or sensation changes.   If compression needs to be removed, un-wrap it do not cut it off.     Should you experience any significant changes in your wound(s), such as infection (redness, swelling, localized heat, increased pain, fever > 101 F, chills) or have any questions regarding your home care instructions, please contact the wound center at [phone]. If after hours, contact your primary care physician or go to the hospital emergency room.   Keep dressing clean, dry and covered while bathing. Only change dressing if it becomes over saturated, soiled or falls off.
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
## 2022-05-16 ENCOUNTER — TELEPHONE (OUTPATIENT)
Dept: MEDICAL GROUP | Age: 57
End: 2022-05-16
Payer: COMMERCIAL

## 2022-05-17 ENCOUNTER — TELEPHONE (OUTPATIENT)
Dept: WOUND CARE | Facility: MEDICAL CENTER | Age: 57
End: 2022-05-17
Payer: COMMERCIAL

## 2022-05-17 NOTE — TELEPHONE ENCOUNTER
Karine's family member Donna called regarding pt's dressing chare. Pt is currently in [place] and has brought extra dressing to change but Donna wanted to clarify instruction on how to change pt's dressing. Advised to clean pt's wound with wound cleanser then apply dry dressing to absorb drainage then tubigrip compression layer. Also advised to change dressings as if drainage strikes through dressings.

## 2022-05-25 ENCOUNTER — OFFICE VISIT (OUTPATIENT)
Dept: WOUND CARE | Facility: MEDICAL CENTER | Age: 57
End: 2022-05-25
Attending: STUDENT IN AN ORGANIZED HEALTH CARE EDUCATION/TRAINING PROGRAM
Payer: COMMERCIAL

## 2022-05-25 ENCOUNTER — HOSPITAL ENCOUNTER (OUTPATIENT)
Facility: MEDICAL CENTER | Age: 57
End: 2022-05-25
Attending: NURSE PRACTITIONER

## 2022-05-25 VITALS
RESPIRATION RATE: 19 BRPM | DIASTOLIC BLOOD PRESSURE: 81 MMHG | HEART RATE: 117 BPM | SYSTOLIC BLOOD PRESSURE: 119 MMHG | OXYGEN SATURATION: 92 % | TEMPERATURE: 98.2 F

## 2022-05-25 DIAGNOSIS — T24.331D FULL THICKNESS BURN OF RIGHT LOWER LEG, SUBSEQUENT ENCOUNTER: ICD-10-CM

## 2022-05-25 DIAGNOSIS — L08.9 WOUND INFECTION: ICD-10-CM

## 2022-05-25 DIAGNOSIS — S71.101D OPEN WOUND OF RIGHT THIGH, SUBSEQUENT ENCOUNTER: ICD-10-CM

## 2022-05-25 DIAGNOSIS — I89.0 LYMPHEDEMA: ICD-10-CM

## 2022-05-25 DIAGNOSIS — S81.801D OPEN WOUND OF RIGHT LOWER LEG, SUBSEQUENT ENCOUNTER: ICD-10-CM

## 2022-05-25 DIAGNOSIS — T14.8XXA WOUND INFECTION: Primary | ICD-10-CM

## 2022-05-25 DIAGNOSIS — L08.9 WOUND INFECTION: Primary | ICD-10-CM

## 2022-05-25 DIAGNOSIS — T14.8XXA WOUND INFECTION: ICD-10-CM

## 2022-05-25 PROCEDURE — 11045 DBRDMT SUBQ TISS EACH ADDL: CPT | Performed by: NURSE PRACTITIONER

## 2022-05-25 PROCEDURE — 99213 OFFICE O/P EST LOW 20 MIN: CPT

## 2022-05-25 PROCEDURE — 11045 DBRDMT SUBQ TISS EACH ADDL: CPT

## 2022-05-25 PROCEDURE — 87070 CULTURE OTHR SPECIMN AEROBIC: CPT

## 2022-05-25 PROCEDURE — 11042 DBRDMT SUBQ TIS 1ST 20SQCM/<: CPT

## 2022-05-25 PROCEDURE — 16020 DRESS/DEBRID P-THICK BURN S: CPT | Performed by: NURSE PRACTITIONER

## 2022-05-25 PROCEDURE — 87077 CULTURE AEROBIC IDENTIFY: CPT

## 2022-05-25 PROCEDURE — 87186 SC STD MICRODIL/AGAR DIL: CPT

## 2022-05-25 PROCEDURE — 16020 DRESS/DEBRID P-THICK BURN S: CPT

## 2022-05-25 PROCEDURE — 87205 SMEAR GRAM STAIN: CPT

## 2022-05-25 PROCEDURE — 11042 DBRDMT SUBQ TIS 1ST 20SQCM/<: CPT | Mod: 59 | Performed by: NURSE PRACTITIONER

## 2022-05-25 ASSESSMENT — ENCOUNTER SYMPTOMS
NAUSEA: 0
WHEEZING: 0
DOUBLE VISION: 0
COUGH: 0
HEADACHES: 0
SHORTNESS OF BREATH: 0
DIZZINESS: 0
FEVER: 0
CONSTIPATION: 0
ROS SKIN COMMENTS: FULL-THICKNESS WOUND RIGHT ANTERIOR LOWER EXTREMITY
BLURRED VISION: 0
PALPITATIONS: 0
CHILLS: 0
VOMITING: 0
DIARRHEA: 0
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
## 2022-05-25 NOTE — PATIENT INSTRUCTIONS
-Keep your wound dressing clean, dry, and intact.    -Change your dressing if it becomes soiled, soaked, or falls off.    -Should you experience any significant changes in your wound(s), such as infection (redness, swelling, localized heat, increased pain, fever > 101 F, chills) or have any questions regarding your home care instructions, please contact the wound center at [phone]. If after hours, contact your primary care physician or go to the hospital emergency room.

## 2022-05-26 LAB
GRAM STN SPEC: NORMAL
SIGNIFICANT IND 70042: NORMAL
SITE SITE: NORMAL
SOURCE SOURCE: NORMAL

## 2022-05-27 DIAGNOSIS — E66.9 OBESITY (BMI 30-39.9): ICD-10-CM

## 2022-05-31 ENCOUNTER — TELEPHONE (OUTPATIENT)
Dept: WOUND CARE | Facility: MEDICAL CENTER | Age: 57
End: 2022-05-31
Payer: COMMERCIAL

## 2022-05-31 RX ORDER — PHENTERMINE HYDROCHLORIDE 37.5 MG/1
CAPSULE ORAL
Qty: 90 CAPSULE | Refills: 0 | Status: ON HOLD | OUTPATIENT
Start: 2022-05-31 | End: 2022-06-17

## 2022-05-31 NOTE — TELEPHONE ENCOUNTER
Returned telephone call from Karine regarding culture results. States feeling well, denies fever or chills.  Dr. Galeana reviewed culture results and will correlate  her results tomorrow at appointment. Karine confirms still taking antibiotics that were prescribed.

## 2022-06-01 ENCOUNTER — APPOINTMENT (OUTPATIENT)
Dept: RADIOLOGY | Facility: MEDICAL CENTER | Age: 57
DRG: 155 | End: 2022-06-01
Attending: EMERGENCY MEDICINE

## 2022-06-01 ENCOUNTER — OFFICE VISIT (OUTPATIENT)
Dept: WOUND CARE | Facility: MEDICAL CENTER | Age: 57
End: 2022-06-01
Attending: STUDENT IN AN ORGANIZED HEALTH CARE EDUCATION/TRAINING PROGRAM
Payer: COMMERCIAL

## 2022-06-01 ENCOUNTER — HOSPITAL ENCOUNTER (INPATIENT)
Facility: MEDICAL CENTER | Age: 57
LOS: 16 days | DRG: 155 | End: 2022-06-17
Attending: EMERGENCY MEDICINE | Admitting: STUDENT IN AN ORGANIZED HEALTH CARE EDUCATION/TRAINING PROGRAM
Payer: COMMERCIAL

## 2022-06-01 VITALS
OXYGEN SATURATION: 98 % | TEMPERATURE: 97.9 F | HEART RATE: 113 BPM | SYSTOLIC BLOOD PRESSURE: 127 MMHG | RESPIRATION RATE: 18 BRPM | DIASTOLIC BLOOD PRESSURE: 83 MMHG

## 2022-06-01 DIAGNOSIS — L08.9 WOUND INFECTION: ICD-10-CM

## 2022-06-01 DIAGNOSIS — I89.0 LYMPHEDEMA: ICD-10-CM

## 2022-06-01 DIAGNOSIS — S09.93XA FACIAL INJURY, INITIAL ENCOUNTER: ICD-10-CM

## 2022-06-01 DIAGNOSIS — S81.801D OPEN WOUND OF RIGHT LOWER EXTREMITY, SUBSEQUENT ENCOUNTER: ICD-10-CM

## 2022-06-01 DIAGNOSIS — S81.801D WOUND OF RIGHT LOWER EXTREMITY, SUBSEQUENT ENCOUNTER: ICD-10-CM

## 2022-06-01 DIAGNOSIS — S81.801D OPEN WOUND OF RIGHT LOWER LEG, SUBSEQUENT ENCOUNTER: ICD-10-CM

## 2022-06-01 DIAGNOSIS — L03.115 CELLULITIS OF RIGHT LOWER EXTREMITY: ICD-10-CM

## 2022-06-01 DIAGNOSIS — T14.8XXA WOUND INFECTION: ICD-10-CM

## 2022-06-01 DIAGNOSIS — T24.331D FULL THICKNESS BURN OF RIGHT LOWER LEG, SUBSEQUENT ENCOUNTER: ICD-10-CM

## 2022-06-01 DIAGNOSIS — S71.101D OPEN WOUND OF RIGHT THIGH, SUBSEQUENT ENCOUNTER: ICD-10-CM

## 2022-06-01 DIAGNOSIS — R60.0 LEG EDEMA: ICD-10-CM

## 2022-06-01 DIAGNOSIS — S81.801A OPEN WOUND OF RIGHT LOWER EXTREMITY, INITIAL ENCOUNTER: ICD-10-CM

## 2022-06-01 PROBLEM — G89.4 CHRONIC PAIN SYNDROME: Status: RESOLVED | Noted: 2017-06-08 | Resolved: 2022-06-01

## 2022-06-01 PROBLEM — S02.92XA MULTIPLE CLOSED FRACTURES OF FACIAL BONE (HCC): Status: ACTIVE | Noted: 2022-06-01

## 2022-06-01 PROBLEM — G89.4 CHRONIC PAIN SYNDROME: Status: ACTIVE | Noted: 2022-06-01

## 2022-06-01 PROBLEM — R55 NEAR SYNCOPE: Status: ACTIVE | Noted: 2022-06-01

## 2022-06-01 PROBLEM — J45.909 ASTHMA: Status: ACTIVE | Noted: 2022-06-01

## 2022-06-01 LAB
ALBUMIN SERPL BCP-MCNC: 2 G/DL (ref 3.2–4.9)
ALBUMIN/GLOB SERPL: 0.4 G/DL
ALP SERPL-CCNC: 160 U/L (ref 30–99)
ALT SERPL-CCNC: 7 U/L (ref 2–50)
ANION GAP SERPL CALC-SCNC: 9 MMOL/L (ref 7–16)
AST SERPL-CCNC: 15 U/L (ref 12–45)
BASOPHILS # BLD AUTO: 0.4 % (ref 0–1.8)
BASOPHILS # BLD: 0.06 K/UL (ref 0–0.12)
BILIRUB SERPL-MCNC: 0.5 MG/DL (ref 0.1–1.5)
BUN SERPL-MCNC: 5 MG/DL (ref 8–22)
CALCIUM SERPL-MCNC: 8 MG/DL (ref 8.5–10.5)
CHLORIDE SERPL-SCNC: 104 MMOL/L (ref 96–112)
CO2 SERPL-SCNC: 24 MMOL/L (ref 20–33)
CORTIS SERPL-MCNC: 9.8 UG/DL (ref 0–23)
CREAT SERPL-MCNC: 0.45 MG/DL (ref 0.5–1.4)
CRP SERPL HS-MCNC: 7.84 MG/DL (ref 0–0.75)
EOSINOPHIL # BLD AUTO: 0.04 K/UL (ref 0–0.51)
EOSINOPHIL NFR BLD: 0.3 % (ref 0–6.9)
ERYTHROCYTE [DISTWIDTH] IN BLOOD BY AUTOMATED COUNT: 56.5 FL (ref 35.9–50)
ERYTHROCYTE [SEDIMENTATION RATE] IN BLOOD BY WESTERGREN METHOD: 75 MM/HOUR (ref 0–25)
ERYTHROCYTE [SEDIMENTATION RATE] IN BLOOD BY WESTERGREN METHOD: 75 MM/HOUR (ref 0–25)
GFR SERPLBLD CREATININE-BSD FMLA CKD-EPI: 112 ML/MIN/1.73 M 2
GLOBULIN SER CALC-MCNC: 4.9 G/DL (ref 1.9–3.5)
GLUCOSE SERPL-MCNC: 87 MG/DL (ref 65–99)
GRAM STN SPEC: NORMAL
HCT VFR BLD AUTO: 25.2 % (ref 37–47)
HGB BLD-MCNC: 8.3 G/DL (ref 12–16)
IMM GRANULOCYTES # BLD AUTO: 0.05 K/UL (ref 0–0.11)
IMM GRANULOCYTES NFR BLD AUTO: 0.4 % (ref 0–0.9)
INR PPP: 1.25 (ref 0.87–1.13)
LACTATE BLD-SCNC: 1.8 MMOL/L (ref 0.5–2)
LYMPHOCYTES # BLD AUTO: 2.22 K/UL (ref 1–4.8)
LYMPHOCYTES NFR BLD: 16.3 % (ref 22–41)
MCH RBC QN AUTO: 26.3 PG (ref 27–33)
MCHC RBC AUTO-ENTMCNC: 32.9 G/DL (ref 33.6–35)
MCV RBC AUTO: 80 FL (ref 81.4–97.8)
MONOCYTES # BLD AUTO: 0.7 K/UL (ref 0–0.85)
MONOCYTES NFR BLD AUTO: 5.1 % (ref 0–13.4)
NEUTROPHILS # BLD AUTO: 10.57 K/UL (ref 2–7.15)
NEUTROPHILS NFR BLD: 77.5 % (ref 44–72)
NRBC # BLD AUTO: 0 K/UL
NRBC BLD-RTO: 0 /100 WBC
PLATELET # BLD AUTO: 561 K/UL (ref 164–446)
PMV BLD AUTO: 8.8 FL (ref 9–12.9)
POTASSIUM SERPL-SCNC: 3.7 MMOL/L (ref 3.6–5.5)
PROT SERPL-MCNC: 6.9 G/DL (ref 6–8.2)
PROTHROMBIN TIME: 15.3 SEC (ref 12–14.6)
RBC # BLD AUTO: 3.15 M/UL (ref 4.2–5.4)
SIGNIFICANT IND 70042: NORMAL
SITE SITE: NORMAL
SODIUM SERPL-SCNC: 137 MMOL/L (ref 135–145)
SOURCE SOURCE: NORMAL
WBC # BLD AUTO: 13.6 K/UL (ref 4.8–10.8)

## 2022-06-01 PROCEDURE — 82533 TOTAL CORTISOL: CPT

## 2022-06-01 PROCEDURE — 99285 EMERGENCY DEPT VISIT HI MDM: CPT

## 2022-06-01 PROCEDURE — 85025 COMPLETE CBC W/AUTO DIFF WBC: CPT

## 2022-06-01 PROCEDURE — A9270 NON-COVERED ITEM OR SERVICE: HCPCS | Performed by: STUDENT IN AN ORGANIZED HEALTH CARE EDUCATION/TRAINING PROGRAM

## 2022-06-01 PROCEDURE — 85652 RBC SED RATE AUTOMATED: CPT

## 2022-06-01 PROCEDURE — 99223 1ST HOSP IP/OBS HIGH 75: CPT | Performed by: STUDENT IN AN ORGANIZED HEALTH CARE EDUCATION/TRAINING PROGRAM

## 2022-06-01 PROCEDURE — 700105 HCHG RX REV CODE 258: Performed by: EMERGENCY MEDICINE

## 2022-06-01 PROCEDURE — 80053 COMPREHEN METABOLIC PANEL: CPT

## 2022-06-01 PROCEDURE — 36415 COLL VENOUS BLD VENIPUNCTURE: CPT

## 2022-06-01 PROCEDURE — 304217 HCHG IRRIGATION SYSTEM

## 2022-06-01 PROCEDURE — 303747 HCHG EXTRA SUTURE

## 2022-06-01 PROCEDURE — 304996 HCHG REPAIR-COMPLEX-NEEL ADDL 5 CM

## 2022-06-01 PROCEDURE — 99214 OFFICE O/P EST MOD 30 MIN: CPT | Mod: 25 | Performed by: STUDENT IN AN ORGANIZED HEALTH CARE EDUCATION/TRAINING PROGRAM

## 2022-06-01 PROCEDURE — 96368 THER/DIAG CONCURRENT INF: CPT

## 2022-06-01 PROCEDURE — 96375 TX/PRO/DX INJ NEW DRUG ADDON: CPT

## 2022-06-01 PROCEDURE — 85610 PROTHROMBIN TIME: CPT

## 2022-06-01 PROCEDURE — 700101 HCHG RX REV CODE 250: Performed by: EMERGENCY MEDICINE

## 2022-06-01 PROCEDURE — 304995 HCHG REPAIR-COMPLEX-NEEL 2.6-7.5 CM

## 2022-06-01 PROCEDURE — 73590 X-RAY EXAM OF LOWER LEG: CPT | Mod: RT

## 2022-06-01 PROCEDURE — 96365 THER/PROPH/DIAG IV INF INIT: CPT

## 2022-06-01 PROCEDURE — 96366 THER/PROPH/DIAG IV INF ADDON: CPT

## 2022-06-01 PROCEDURE — 11042 DBRDMT SUBQ TIS 1ST 20SQCM/<: CPT

## 2022-06-01 PROCEDURE — 770001 HCHG ROOM/CARE - MED/SURG/GYN PRIV*

## 2022-06-01 PROCEDURE — 16020 DRESS/DEBRID P-THICK BURN S: CPT

## 2022-06-01 PROCEDURE — 11042 DBRDMT SUBQ TIS 1ST 20SQCM/<: CPT | Mod: 59 | Performed by: STUDENT IN AN ORGANIZED HEALTH CARE EDUCATION/TRAINING PROGRAM

## 2022-06-01 PROCEDURE — 87205 SMEAR GRAM STAIN: CPT

## 2022-06-01 PROCEDURE — 87040 BLOOD CULTURE FOR BACTERIA: CPT

## 2022-06-01 PROCEDURE — 700111 HCHG RX REV CODE 636 W/ 250 OVERRIDE (IP): Performed by: EMERGENCY MEDICINE

## 2022-06-01 PROCEDURE — 700111 HCHG RX REV CODE 636 W/ 250 OVERRIDE (IP): Performed by: STUDENT IN AN ORGANIZED HEALTH CARE EDUCATION/TRAINING PROGRAM

## 2022-06-01 PROCEDURE — 11045 DBRDMT SUBQ TISS EACH ADDL: CPT

## 2022-06-01 PROCEDURE — 700102 HCHG RX REV CODE 250 W/ 637 OVERRIDE(OP): Performed by: STUDENT IN AN ORGANIZED HEALTH CARE EDUCATION/TRAINING PROGRAM

## 2022-06-01 PROCEDURE — 70450 CT HEAD/BRAIN W/O DYE: CPT

## 2022-06-01 PROCEDURE — 99214 OFFICE O/P EST MOD 30 MIN: CPT

## 2022-06-01 PROCEDURE — 87070 CULTURE OTHR SPECIMN AEROBIC: CPT

## 2022-06-01 PROCEDURE — 11045 DBRDMT SUBQ TISS EACH ADDL: CPT | Performed by: STUDENT IN AN ORGANIZED HEALTH CARE EDUCATION/TRAINING PROGRAM

## 2022-06-01 PROCEDURE — 16020 DRESS/DEBRID P-THICK BURN S: CPT | Performed by: STUDENT IN AN ORGANIZED HEALTH CARE EDUCATION/TRAINING PROGRAM

## 2022-06-01 PROCEDURE — 86140 C-REACTIVE PROTEIN: CPT

## 2022-06-01 PROCEDURE — 700105 HCHG RX REV CODE 258: Performed by: STUDENT IN AN ORGANIZED HEALTH CARE EDUCATION/TRAINING PROGRAM

## 2022-06-01 PROCEDURE — 70486 CT MAXILLOFACIAL W/O DYE: CPT

## 2022-06-01 PROCEDURE — 83605 ASSAY OF LACTIC ACID: CPT

## 2022-06-01 RX ORDER — GUAIFENESIN/DEXTROMETHORPHAN 100-10MG/5
10 SYRUP ORAL EVERY 6 HOURS PRN
Status: DISCONTINUED | OUTPATIENT
Start: 2022-06-01 | End: 2022-06-18 | Stop reason: HOSPADM

## 2022-06-01 RX ORDER — METHOCARBAMOL 500 MG/1
1000 TABLET, FILM COATED ORAL 4 TIMES DAILY PRN
Status: DISCONTINUED | OUTPATIENT
Start: 2022-06-01 | End: 2022-06-18 | Stop reason: HOSPADM

## 2022-06-01 RX ORDER — SODIUM CHLORIDE, SODIUM LACTATE, POTASSIUM CHLORIDE, AND CALCIUM CHLORIDE .6; .31; .03; .02 G/100ML; G/100ML; G/100ML; G/100ML
500 INJECTION, SOLUTION INTRAVENOUS
Status: DISCONTINUED | OUTPATIENT
Start: 2022-06-01 | End: 2022-06-18 | Stop reason: HOSPADM

## 2022-06-01 RX ORDER — FERROUS SULFATE 325(65) MG
325 TABLET ORAL
Status: DISCONTINUED | OUTPATIENT
Start: 2022-06-02 | End: 2022-06-18 | Stop reason: HOSPADM

## 2022-06-01 RX ORDER — BISACODYL 10 MG
10 SUPPOSITORY, RECTAL RECTAL
Status: DISCONTINUED | OUTPATIENT
Start: 2022-06-01 | End: 2022-06-18 | Stop reason: HOSPADM

## 2022-06-01 RX ORDER — FUROSEMIDE 10 MG/ML
20 INJECTION INTRAMUSCULAR; INTRAVENOUS
Status: DISCONTINUED | OUTPATIENT
Start: 2022-06-01 | End: 2022-06-07

## 2022-06-01 RX ORDER — ONDANSETRON 4 MG/1
4 TABLET, ORALLY DISINTEGRATING ORAL EVERY 4 HOURS PRN
Status: DISCONTINUED | OUTPATIENT
Start: 2022-06-01 | End: 2022-06-18 | Stop reason: HOSPADM

## 2022-06-01 RX ORDER — OXYCODONE AND ACETAMINOPHEN 10; 325 MG/1; MG/1
1 TABLET ORAL 4 TIMES DAILY
Status: DISCONTINUED | OUTPATIENT
Start: 2022-06-01 | End: 2022-06-18 | Stop reason: HOSPADM

## 2022-06-01 RX ORDER — PROMETHAZINE HYDROCHLORIDE 25 MG/1
12.5-25 TABLET ORAL EVERY 4 HOURS PRN
Status: DISCONTINUED | OUTPATIENT
Start: 2022-06-01 | End: 2022-06-18 | Stop reason: HOSPADM

## 2022-06-01 RX ORDER — FLUTICASONE PROPIONATE 44 UG/1
2 AEROSOL, METERED RESPIRATORY (INHALATION) 2 TIMES DAILY
Status: DISCONTINUED | OUTPATIENT
Start: 2022-06-01 | End: 2022-06-18 | Stop reason: HOSPADM

## 2022-06-01 RX ORDER — BUPIVACAINE HYDROCHLORIDE AND EPINEPHRINE 5; 5 MG/ML; UG/ML
20 INJECTION, SOLUTION EPIDURAL; INTRACAUDAL; PERINEURAL ONCE
Status: COMPLETED | OUTPATIENT
Start: 2022-06-01 | End: 2022-06-01

## 2022-06-01 RX ORDER — GABAPENTIN 400 MG/1
800 CAPSULE ORAL 4 TIMES DAILY
Status: DISCONTINUED | OUTPATIENT
Start: 2022-06-01 | End: 2022-06-18 | Stop reason: HOSPADM

## 2022-06-01 RX ORDER — AMOXICILLIN 250 MG
2 CAPSULE ORAL 2 TIMES DAILY
Status: DISCONTINUED | OUTPATIENT
Start: 2022-06-01 | End: 2022-06-18 | Stop reason: HOSPADM

## 2022-06-01 RX ORDER — CLINDAMYCIN HYDROCHLORIDE 300 MG/1
1 CAPSULE ORAL 3 TIMES DAILY
Status: ON HOLD | COMMUNITY
Start: 2022-05-19 | End: 2022-06-17

## 2022-06-01 RX ORDER — MORPHINE SULFATE 15 MG/1
15 TABLET, FILM COATED, EXTENDED RELEASE ORAL EVERY 12 HOURS
Status: DISCONTINUED | OUTPATIENT
Start: 2022-06-01 | End: 2022-06-18 | Stop reason: HOSPADM

## 2022-06-01 RX ORDER — SODIUM HYPOCHLORITE 1.25 MG/ML
10 SOLUTION TOPICAL 2 TIMES DAILY
Qty: 473 ML | Refills: 0 | Status: SHIPPED | OUTPATIENT
Start: 2022-06-01 | End: 2022-06-01

## 2022-06-01 RX ORDER — PHENTERMINE HYDROCHLORIDE 37.5 MG/1
37.5 CAPSULE ORAL EVERY MORNING
Status: DISCONTINUED | OUTPATIENT
Start: 2022-06-02 | End: 2022-06-01

## 2022-06-01 RX ORDER — HYDROMORPHONE HYDROCHLORIDE 1 MG/ML
1 INJECTION, SOLUTION INTRAMUSCULAR; INTRAVENOUS; SUBCUTANEOUS ONCE
Status: COMPLETED | OUTPATIENT
Start: 2022-06-01 | End: 2022-06-01

## 2022-06-01 RX ORDER — PROCHLORPERAZINE EDISYLATE 5 MG/ML
5-10 INJECTION INTRAMUSCULAR; INTRAVENOUS EVERY 4 HOURS PRN
Status: DISCONTINUED | OUTPATIENT
Start: 2022-06-01 | End: 2022-06-18 | Stop reason: HOSPADM

## 2022-06-01 RX ORDER — CEPHALEXIN 500 MG/1
1 CAPSULE ORAL 4 TIMES DAILY
Status: ON HOLD | COMMUNITY
Start: 2022-05-19 | End: 2022-06-17

## 2022-06-01 RX ORDER — POLYETHYLENE GLYCOL 3350 17 G/17G
1 POWDER, FOR SOLUTION ORAL
Status: DISCONTINUED | OUTPATIENT
Start: 2022-06-01 | End: 2022-06-18 | Stop reason: HOSPADM

## 2022-06-01 RX ORDER — ACETAMINOPHEN 325 MG/1
650 TABLET ORAL EVERY 6 HOURS PRN
Status: DISCONTINUED | OUTPATIENT
Start: 2022-06-01 | End: 2022-06-02

## 2022-06-01 RX ORDER — ONDANSETRON 2 MG/ML
4 INJECTION INTRAMUSCULAR; INTRAVENOUS ONCE
Status: COMPLETED | OUTPATIENT
Start: 2022-06-01 | End: 2022-06-01

## 2022-06-01 RX ORDER — ENOXAPARIN SODIUM 100 MG/ML
40 INJECTION SUBCUTANEOUS DAILY
Status: DISCONTINUED | OUTPATIENT
Start: 2022-06-01 | End: 2022-06-18 | Stop reason: HOSPADM

## 2022-06-01 RX ORDER — LANOLIN ALCOHOL/MO/W.PET/CERES
400 CREAM (GRAM) TOPICAL ONCE
Status: COMPLETED | OUTPATIENT
Start: 2022-06-01 | End: 2022-06-01

## 2022-06-01 RX ORDER — ONDANSETRON 2 MG/ML
4 INJECTION INTRAMUSCULAR; INTRAVENOUS EVERY 4 HOURS PRN
Status: DISCONTINUED | OUTPATIENT
Start: 2022-06-01 | End: 2022-06-18 | Stop reason: HOSPADM

## 2022-06-01 RX ORDER — PROMETHAZINE HYDROCHLORIDE 25 MG/1
12.5-25 SUPPOSITORY RECTAL EVERY 4 HOURS PRN
Status: DISCONTINUED | OUTPATIENT
Start: 2022-06-01 | End: 2022-06-18 | Stop reason: HOSPADM

## 2022-06-01 RX ORDER — HYDRALAZINE HYDROCHLORIDE 20 MG/ML
10 INJECTION INTRAMUSCULAR; INTRAVENOUS EVERY 4 HOURS PRN
Status: DISCONTINUED | OUTPATIENT
Start: 2022-06-01 | End: 2022-06-18 | Stop reason: HOSPADM

## 2022-06-01 RX ORDER — POTASSIUM CHLORIDE 20 MEQ/1
40 TABLET, EXTENDED RELEASE ORAL ONCE
Status: COMPLETED | OUTPATIENT
Start: 2022-06-01 | End: 2022-06-01

## 2022-06-01 RX ADMIN — FUROSEMIDE 20 MG: 20 INJECTION, SOLUTION INTRAMUSCULAR; INTRAVENOUS at 20:46

## 2022-06-01 RX ADMIN — OXYCODONE AND ACETAMINOPHEN 1 TABLET: 10; 325 TABLET ORAL at 20:46

## 2022-06-01 RX ADMIN — BUPIVACAINE HYDROCHLORIDE AND EPINEPHRINE 20 ML: 5; 5 INJECTION, SOLUTION EPIDURAL; INTRACAUDAL; PERINEURAL at 17:15

## 2022-06-01 RX ADMIN — Medication 400 MG: at 20:46

## 2022-06-01 RX ADMIN — GABAPENTIN 800 MG: 400 CAPSULE ORAL at 20:01

## 2022-06-01 RX ADMIN — ONDANSETRON 4 MG: 2 INJECTION INTRAMUSCULAR; INTRAVENOUS at 16:59

## 2022-06-01 RX ADMIN — PIPERACILLIN SODIUM, TAZOBACTAM SODIUM 4.5 G: 4; .5 INJECTION, POWDER, LYOPHILIZED, FOR SOLUTION INTRAVENOUS at 20:50

## 2022-06-01 RX ADMIN — VANCOMYCIN HYDROCHLORIDE 2250 MG: 500 INJECTION, POWDER, LYOPHILIZED, FOR SOLUTION INTRAVENOUS at 17:02

## 2022-06-01 RX ADMIN — PIPERACILLIN SODIUM, TAZOBACTAM SODIUM 4.5 G: 4; .5 INJECTION, POWDER, LYOPHILIZED, FOR SOLUTION INTRAVENOUS at 16:46

## 2022-06-01 RX ADMIN — HYDROMORPHONE HYDROCHLORIDE 1 MG: 1 INJECTION, SOLUTION INTRAMUSCULAR; INTRAVENOUS; SUBCUTANEOUS at 16:59

## 2022-06-01 RX ADMIN — POTASSIUM CHLORIDE 40 MEQ: 1500 TABLET, EXTENDED RELEASE ORAL at 20:46

## 2022-06-01 RX ADMIN — MORPHINE SULFATE 15 MG: 15 TABLET, FILM COATED, EXTENDED RELEASE ORAL at 20:01

## 2022-06-01 ASSESSMENT — ENCOUNTER SYMPTOMS
HEADACHES: 0
FOCAL WEAKNESS: 0
FALLS: 1
ABDOMINAL PAIN: 0
COUGH: 0
MYALGIAS: 1
COUGH: 0
VOMITING: 0
FLANK PAIN: 0
BACK PAIN: 1
NAUSEA: 0
CHILLS: 0
NECK PAIN: 0
ROS SKIN COMMENTS: FULL-THICKNESS WOUND RIGHT ANTERIOR LOWER EXTREMITY
SHORTNESS OF BREATH: 0
DOUBLE VISION: 0
HEADACHES: 0
EYE PAIN: 1
FEVER: 0
NAUSEA: 0
EYE REDNESS: 0
PHOTOPHOBIA: 0
WHEEZING: 0
FEVER: 0
WEAKNESS: 1
SHORTNESS OF BREATH: 0
EYE DISCHARGE: 0
BLURRED VISION: 0
BRUISES/BLEEDS EASILY: 1
DIARRHEA: 0
WHEEZING: 0
DEPRESSION: 0
DOUBLE VISION: 0
PND: 0
BLURRED VISION: 0
DIZZINESS: 0
PALPITATIONS: 0
CONSTIPATION: 0
VOMITING: 0
CHILLS: 0
HEARTBURN: 0
DIZZINESS: 0

## 2022-06-01 ASSESSMENT — COGNITIVE AND FUNCTIONAL STATUS - GENERAL
TURNING FROM BACK TO SIDE WHILE IN FLAT BAD: A LITTLE
SUGGESTED CMS G CODE MODIFIER DAILY ACTIVITY: CJ
SUGGESTED CMS G CODE MODIFIER MOBILITY: CL
TOILETING: A LITTLE
MOVING FROM LYING ON BACK TO SITTING ON SIDE OF FLAT BED: A LOT
CLIMB 3 TO 5 STEPS WITH RAILING: A LOT
DAILY ACTIVITIY SCORE: 20
DRESSING REGULAR LOWER BODY CLOTHING: A LITTLE
DRESSING REGULAR UPPER BODY CLOTHING: A LITTLE
MOBILITY SCORE: 13
STANDING UP FROM CHAIR USING ARMS: A LOT
HELP NEEDED FOR BATHING: A LITTLE
WALKING IN HOSPITAL ROOM: A LOT
MOVING TO AND FROM BED TO CHAIR: A LOT

## 2022-06-01 ASSESSMENT — LIFESTYLE VARIABLES
ON A TYPICAL DAY WHEN YOU DRINK ALCOHOL HOW MANY DRINKS DO YOU HAVE: 0
TOTAL SCORE: 0
ALCOHOL_USE: NO
HAVE PEOPLE ANNOYED YOU BY CRITICIZING YOUR DRINKING: NO
TOTAL SCORE: 0
EVER HAD A DRINK FIRST THING IN THE MORNING TO STEADY YOUR NERVES TO GET RID OF A HANGOVER: NO
HOW MANY TIMES IN THE PAST YEAR HAVE YOU HAD 5 OR MORE DRINKS IN A DAY: 0
TOTAL SCORE: 0
SUBSTANCE_ABUSE: 0
AVERAGE NUMBER OF DAYS PER WEEK YOU HAVE A DRINK CONTAINING ALCOHOL: 0
HAVE YOU EVER FELT YOU SHOULD CUT DOWN ON YOUR DRINKING: NO
CONSUMPTION TOTAL: NEGATIVE
DOES PATIENT WANT TO STOP DRINKING: NO
EVER FELT BAD OR GUILTY ABOUT YOUR DRINKING: NO

## 2022-06-01 ASSESSMENT — PAIN DESCRIPTION - PAIN TYPE: TYPE: ACUTE PAIN

## 2022-06-01 ASSESSMENT — FIBROSIS 4 INDEX: FIB4 SCORE: 0.77

## 2022-06-01 NOTE — ED TRIAGE NOTES
"Chief Complaint   Patient presents with   • Wound Check     Pt sent by wound clinic for RLE wound. Pt has had wound since December, staff concerned about green drainage. Pictures in chart, leg wrapped pta. Pt reporting increased swelling and pain.   • T-5000 FALL     Pt tripped and fell onto her face today. Negative LOC. Bruising noted to R eye. Bandage in place from wound care to bridge of nose and R cheek. No thinners or ASA.      BP (!) 150/95   Pulse (!) 117   Temp 36.9 °C (98.5 °F) (Temporal)   Resp 19   Ht 1.6 m (5' 3\")   Wt 87.2 kg (192 lb 3.9 oz)   LMP  (LMP Unknown)   SpO2 96%   BMI 34.05 kg/m²     Pt ambulated into triage, assisted into WC. mask in place. NAD, encouraged to return to the triage nurse or tech with any new complaints or symptoms.  "

## 2022-06-01 NOTE — PROGRESS NOTES
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 and possibly content that I did not discover before finalizing the note.
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
## 2022-06-01 NOTE — PATIENT INSTRUCTIONS
-Keep your wound dressing clean, dry, and intact.    -Change your dressing if it becomes soiled, soaked, or falls off.    -Should you experience any significant changes in your wound(s), such as infection (redness, swelling, localized heat, increased pain, fever > 101 F, chills) or have any questions regarding your home care instructions, please contact the wound center at [phone]. If after hours, contact your primary care physician or go to the hospital emergency room.

## 2022-06-01 NOTE — ED NOTES
Chief Complaint   Patient presents with   • Wound Check     Pt sent by wound clinic for RLE wound. Pt has had wound since December, staff concerned about green drainage. Pictures in chart, leg wrapped pta. Pt reporting increased swelling and pain.   • T-5000 FALL     Pt tripped and fell onto her face today. Negative LOC. Bruising noted to R eye. Bandage in place from wound care to bridge of nose and R cheek. No thinners or ASA.      Agree with triage RN. Pt has large deep laceration to right cheek as well as bridge of nose.   Unable to establish PIV, waiting for 2nd RN.   Xray at bedside.

## 2022-06-02 PROBLEM — D64.9 ANEMIA: Status: ACTIVE | Noted: 2022-06-02
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
## 2022-06-02 LAB
25(OH)D3 SERPL-MCNC: 44 NG/ML (ref 30–100)
ALBUMIN SERPL BCP-MCNC: 1.8 G/DL (ref 3.2–4.9)
ALBUMIN/GLOB SERPL: 0.4 G/DL
ALP SERPL-CCNC: 152 U/L (ref 30–99)
ALT SERPL-CCNC: 7 U/L (ref 2–50)
ANION GAP SERPL CALC-SCNC: 8 MMOL/L (ref 7–16)
AST SERPL-CCNC: 16 U/L (ref 12–45)
BASOPHILS # BLD AUTO: 0.4 % (ref 0–1.8)
BASOPHILS # BLD: 0.04 K/UL (ref 0–0.12)
BILIRUB SERPL-MCNC: 0.3 MG/DL (ref 0.1–1.5)
BUN SERPL-MCNC: 5 MG/DL (ref 8–22)
CALCIUM SERPL-MCNC: 7.4 MG/DL (ref 8.5–10.5)
CHLORIDE SERPL-SCNC: 105 MMOL/L (ref 96–112)
CO2 SERPL-SCNC: 23 MMOL/L (ref 20–33)
CREAT SERPL-MCNC: 0.46 MG/DL (ref 0.5–1.4)
EOSINOPHIL # BLD AUTO: 0.13 K/UL (ref 0–0.51)
EOSINOPHIL NFR BLD: 1.3 % (ref 0–6.9)
ERYTHROCYTE [DISTWIDTH] IN BLOOD BY AUTOMATED COUNT: 57.1 FL (ref 35.9–50)
EST. AVERAGE GLUCOSE BLD GHB EST-MCNC: 88 MG/DL
FOLATE SERPL-MCNC: 3.5 NG/ML
GFR SERPLBLD CREATININE-BSD FMLA CKD-EPI: 112 ML/MIN/1.73 M 2
GLOBULIN SER CALC-MCNC: 4.5 G/DL (ref 1.9–3.5)
GLUCOSE SERPL-MCNC: 121 MG/DL (ref 65–99)
HBA1C MFR BLD: 4.7 % (ref 4–5.6)
HCT VFR BLD AUTO: 23.6 % (ref 37–47)
HGB BLD-MCNC: 8 G/DL (ref 12–16)
IMM GRANULOCYTES # BLD AUTO: 0.04 K/UL (ref 0–0.11)
IMM GRANULOCYTES NFR BLD AUTO: 0.4 % (ref 0–0.9)
LDH SERPL L TO P-CCNC: 188 U/L (ref 107–266)
LYMPHOCYTES # BLD AUTO: 2.16 K/UL (ref 1–4.8)
LYMPHOCYTES NFR BLD: 21.6 % (ref 22–41)
MAGNESIUM SERPL-MCNC: 1.8 MG/DL (ref 1.5–2.5)
MCH RBC QN AUTO: 27.3 PG (ref 27–33)
MCHC RBC AUTO-ENTMCNC: 33.9 G/DL (ref 33.6–35)
MCV RBC AUTO: 80.5 FL (ref 81.4–97.8)
MONOCYTES # BLD AUTO: 0.75 K/UL (ref 0–0.85)
MONOCYTES NFR BLD AUTO: 7.5 % (ref 0–13.4)
NEUTROPHILS # BLD AUTO: 6.86 K/UL (ref 2–7.15)
NEUTROPHILS NFR BLD: 68.8 % (ref 44–72)
NRBC # BLD AUTO: 0 K/UL
NRBC BLD-RTO: 0 /100 WBC
PHOSPHATE SERPL-MCNC: 3.4 MG/DL (ref 2.5–4.5)
PLATELET # BLD AUTO: 520 K/UL (ref 164–446)
PMV BLD AUTO: 9.1 FL (ref 9–12.9)
POTASSIUM SERPL-SCNC: 4.1 MMOL/L (ref 3.6–5.5)
PROCALCITONIN SERPL-MCNC: 0.42 NG/ML
PROT SERPL-MCNC: 6.3 G/DL (ref 6–8.2)
RBC # BLD AUTO: 2.93 M/UL (ref 4.2–5.4)
SODIUM SERPL-SCNC: 136 MMOL/L (ref 135–145)
WBC # BLD AUTO: 10 K/UL (ref 4.8–10.8)

## 2022-06-02 PROCEDURE — 700102 HCHG RX REV CODE 250 W/ 637 OVERRIDE(OP): Performed by: NURSE PRACTITIONER

## 2022-06-02 PROCEDURE — 82306 VITAMIN D 25 HYDROXY: CPT

## 2022-06-02 PROCEDURE — 80053 COMPREHEN METABOLIC PANEL: CPT

## 2022-06-02 PROCEDURE — 83735 ASSAY OF MAGNESIUM: CPT

## 2022-06-02 PROCEDURE — 302098 PASTE RING (FLAT): Performed by: NURSE PRACTITIONER

## 2022-06-02 PROCEDURE — 83036 HEMOGLOBIN GLYCOSYLATED A1C: CPT

## 2022-06-02 PROCEDURE — 82746 ASSAY OF FOLIC ACID SERUM: CPT

## 2022-06-02 PROCEDURE — 700111 HCHG RX REV CODE 636 W/ 250 OVERRIDE (IP): Performed by: STUDENT IN AN ORGANIZED HEALTH CARE EDUCATION/TRAINING PROGRAM

## 2022-06-02 PROCEDURE — A9270 NON-COVERED ITEM OR SERVICE: HCPCS | Performed by: NURSE PRACTITIONER

## 2022-06-02 PROCEDURE — 84100 ASSAY OF PHOSPHORUS: CPT

## 2022-06-02 PROCEDURE — 36415 COLL VENOUS BLD VENIPUNCTURE: CPT

## 2022-06-02 PROCEDURE — 97166 OT EVAL MOD COMPLEX 45 MIN: CPT

## 2022-06-02 PROCEDURE — 700111 HCHG RX REV CODE 636 W/ 250 OVERRIDE (IP): Performed by: NURSE PRACTITIONER

## 2022-06-02 PROCEDURE — 84145 PROCALCITONIN (PCT): CPT

## 2022-06-02 PROCEDURE — 99222 1ST HOSP IP/OBS MODERATE 55: CPT

## 2022-06-02 PROCEDURE — 99232 SBSQ HOSP IP/OBS MODERATE 35: CPT | Mod: FS | Performed by: NURSE PRACTITIONER

## 2022-06-02 PROCEDURE — 700105 HCHG RX REV CODE 258: Performed by: STUDENT IN AN ORGANIZED HEALTH CARE EDUCATION/TRAINING PROGRAM

## 2022-06-02 PROCEDURE — 83615 LACTATE (LD) (LDH) ENZYME: CPT

## 2022-06-02 PROCEDURE — 700101 HCHG RX REV CODE 250: Performed by: NURSE PRACTITIONER

## 2022-06-02 PROCEDURE — 85025 COMPLETE CBC W/AUTO DIFF WBC: CPT

## 2022-06-02 PROCEDURE — 97602 WOUND(S) CARE NON-SELECTIVE: CPT

## 2022-06-02 PROCEDURE — 700102 HCHG RX REV CODE 250 W/ 637 OVERRIDE(OP): Performed by: STUDENT IN AN ORGANIZED HEALTH CARE EDUCATION/TRAINING PROGRAM

## 2022-06-02 PROCEDURE — A9270 NON-COVERED ITEM OR SERVICE: HCPCS | Performed by: STUDENT IN AN ORGANIZED HEALTH CARE EDUCATION/TRAINING PROGRAM

## 2022-06-02 PROCEDURE — 770001 HCHG ROOM/CARE - MED/SURG/GYN PRIV*

## 2022-06-02 RX ORDER — SODIUM HYPOCHLORITE 1.25 MG/ML
SOLUTION TOPICAL DAILY
Status: DISCONTINUED | OUTPATIENT
Start: 2022-06-02 | End: 2022-06-04 | Stop reason: ALTCHOICE

## 2022-06-02 RX ORDER — CIPROFLOXACIN 500 MG/1
500 TABLET, FILM COATED ORAL EVERY 12 HOURS
Status: COMPLETED | OUTPATIENT
Start: 2022-06-02 | End: 2022-06-08

## 2022-06-02 RX ORDER — HYDROMORPHONE HYDROCHLORIDE 1 MG/ML
0.5 INJECTION, SOLUTION INTRAMUSCULAR; INTRAVENOUS; SUBCUTANEOUS ONCE
Status: COMPLETED | OUTPATIENT
Start: 2022-06-02 | End: 2022-06-02

## 2022-06-02 RX ORDER — ACETAMINOPHEN 325 MG/1
650 TABLET ORAL EVERY 6 HOURS
Status: DISPENSED | OUTPATIENT
Start: 2022-06-02 | End: 2022-06-07

## 2022-06-02 RX ORDER — HYDROMORPHONE HYDROCHLORIDE 1 MG/ML
0.5 INJECTION, SOLUTION INTRAMUSCULAR; INTRAVENOUS; SUBCUTANEOUS
Status: DISCONTINUED | OUTPATIENT
Start: 2022-06-02 | End: 2022-06-02

## 2022-06-02 RX ORDER — LIDOCAINE HYDROCHLORIDE 40 MG/ML
SOLUTION TOPICAL
Status: DISCONTINUED | OUTPATIENT
Start: 2022-06-02 | End: 2022-06-18 | Stop reason: HOSPADM

## 2022-06-02 RX ORDER — OXYCODONE HYDROCHLORIDE 5 MG/1
5 TABLET ORAL EVERY 8 HOURS PRN
Status: DISCONTINUED | OUTPATIENT
Start: 2022-06-02 | End: 2022-06-18 | Stop reason: HOSPADM

## 2022-06-02 RX ORDER — ASCORBIC ACID 500 MG
500 TABLET ORAL DAILY
Status: DISCONTINUED | OUTPATIENT
Start: 2022-06-02 | End: 2022-06-18 | Stop reason: HOSPADM

## 2022-06-02 RX ORDER — SODIUM HYPOCHLORITE 1.25 MG/ML
SOLUTION TOPICAL PRN
Status: DISCONTINUED | OUTPATIENT
Start: 2022-06-02 | End: 2022-06-02

## 2022-06-02 RX ORDER — CELECOXIB 200 MG/1
200 CAPSULE ORAL 2 TIMES DAILY PRN
Status: DISCONTINUED | OUTPATIENT
Start: 2022-06-07 | End: 2022-06-18 | Stop reason: HOSPADM

## 2022-06-02 RX ORDER — M-VIT,TX,IRON,MINS/CALC/FOLIC 27MG-0.4MG
1 TABLET ORAL DAILY
Status: DISCONTINUED | OUTPATIENT
Start: 2022-06-02 | End: 2022-06-18 | Stop reason: HOSPADM

## 2022-06-02 RX ORDER — SULFAMETHOXAZOLE AND TRIMETHOPRIM 800; 160 MG/1; MG/1
1 TABLET ORAL EVERY 12 HOURS
Status: COMPLETED | OUTPATIENT
Start: 2022-06-02 | End: 2022-06-08

## 2022-06-02 RX ORDER — ACETAMINOPHEN 325 MG/1
650 TABLET ORAL EVERY 6 HOURS PRN
Status: DISCONTINUED | OUTPATIENT
Start: 2022-06-07 | End: 2022-06-11

## 2022-06-02 RX ORDER — HYDROMORPHONE HYDROCHLORIDE 1 MG/ML
0.5 INJECTION, SOLUTION INTRAMUSCULAR; INTRAVENOUS; SUBCUTANEOUS EVERY 4 HOURS PRN
Status: DISCONTINUED | OUTPATIENT
Start: 2022-06-02 | End: 2022-06-18 | Stop reason: HOSPADM

## 2022-06-02 RX ORDER — OXYCODONE HYDROCHLORIDE 10 MG/1
10 TABLET ORAL EVERY 8 HOURS PRN
Status: DISCONTINUED | OUTPATIENT
Start: 2022-06-02 | End: 2022-06-18 | Stop reason: HOSPADM

## 2022-06-02 RX ORDER — LIDOCAINE HYDROCHLORIDE 20 MG/ML
20 INJECTION, SOLUTION INFILTRATION; PERINEURAL
Status: DISCONTINUED | OUTPATIENT
Start: 2022-06-02 | End: 2022-06-18 | Stop reason: HOSPADM

## 2022-06-02 RX ORDER — CELECOXIB 200 MG/1
200 CAPSULE ORAL 2 TIMES DAILY
Status: COMPLETED | OUTPATIENT
Start: 2022-06-02 | End: 2022-06-07

## 2022-06-02 RX ADMIN — CIPROFLOXACIN HYDROCHLORIDE 500 MG: 500 TABLET, FILM COATED ORAL at 23:25

## 2022-06-02 RX ADMIN — LIDOCAINE HYDROCHLORIDE 30 ML: 40 SOLUTION TOPICAL at 13:37

## 2022-06-02 RX ADMIN — FLUTICASONE PROPIONATE 88 MCG: 44 AEROSOL, METERED RESPIRATORY (INHALATION) at 17:02

## 2022-06-02 RX ADMIN — ACETAMINOPHEN 650 MG: 325 TABLET ORAL at 00:22

## 2022-06-02 RX ADMIN — CIPROFLOXACIN HYDROCHLORIDE 500 MG: 500 TABLET, FILM COATED ORAL at 11:26

## 2022-06-02 RX ADMIN — MULTIPLE VITAMINS W/ MINERALS TAB 1 TABLET: TAB at 17:02

## 2022-06-02 RX ADMIN — FLUTICASONE PROPIONATE 88 MCG: 44 AEROSOL, METERED RESPIRATORY (INHALATION) at 06:27

## 2022-06-02 RX ADMIN — ACETAMINOPHEN 650 MG: 325 TABLET ORAL at 23:25

## 2022-06-02 RX ADMIN — GABAPENTIN 800 MG: 400 CAPSULE ORAL at 17:01

## 2022-06-02 RX ADMIN — DOCUSATE SODIUM 50 MG AND SENNOSIDES 8.6 MG 2 TABLET: 8.6; 5 TABLET, FILM COATED ORAL at 18:00

## 2022-06-02 RX ADMIN — ACETAMINOPHEN 650 MG: 325 TABLET ORAL at 17:13

## 2022-06-02 RX ADMIN — FUROSEMIDE 20 MG: 20 INJECTION, SOLUTION INTRAMUSCULAR; INTRAVENOUS at 16:00

## 2022-06-02 RX ADMIN — ENOXAPARIN SODIUM 40 MG: 40 INJECTION SUBCUTANEOUS at 18:00

## 2022-06-02 RX ADMIN — MORPHINE SULFATE 15 MG: 15 TABLET, FILM COATED, EXTENDED RELEASE ORAL at 20:41

## 2022-06-02 RX ADMIN — PIPERACILLIN SODIUM, TAZOBACTAM SODIUM 4.5 G: 4; .5 INJECTION, POWDER, LYOPHILIZED, FOR SOLUTION INTRAVENOUS at 06:26

## 2022-06-02 RX ADMIN — OXYCODONE AND ACETAMINOPHEN 1 TABLET: 10; 325 TABLET ORAL at 08:29

## 2022-06-02 RX ADMIN — FUROSEMIDE 20 MG: 20 INJECTION, SOLUTION INTRAMUSCULAR; INTRAVENOUS at 08:30

## 2022-06-02 RX ADMIN — FERROUS SULFATE TAB 325 MG (65 MG ELEMENTAL FE) 325 MG: 325 (65 FE) TAB at 08:30

## 2022-06-02 RX ADMIN — VANCOMYCIN HYDROCHLORIDE 1250 MG: 500 INJECTION, POWDER, LYOPHILIZED, FOR SOLUTION INTRAVENOUS at 04:11

## 2022-06-02 RX ADMIN — GABAPENTIN 800 MG: 400 CAPSULE ORAL at 20:41

## 2022-06-02 RX ADMIN — HYDROMORPHONE HYDROCHLORIDE 0.5 MG: 1 INJECTION, SOLUTION INTRAMUSCULAR; INTRAVENOUS; SUBCUTANEOUS at 13:12

## 2022-06-02 RX ADMIN — OXYCODONE HYDROCHLORIDE AND ACETAMINOPHEN 500 MG: 500 TABLET ORAL at 17:01

## 2022-06-02 RX ADMIN — CELECOXIB 200 MG: 200 CAPSULE ORAL at 17:12

## 2022-06-02 RX ADMIN — OXYCODONE AND ACETAMINOPHEN 1 TABLET: 10; 325 TABLET ORAL at 20:41

## 2022-06-02 RX ADMIN — OXYCODONE AND ACETAMINOPHEN 1 TABLET: 10; 325 TABLET ORAL at 11:47

## 2022-06-02 RX ADMIN — OXYCODONE AND ACETAMINOPHEN 1 TABLET: 10; 325 TABLET ORAL at 17:02

## 2022-06-02 RX ADMIN — DOCUSATE SODIUM 50 MG AND SENNOSIDES 8.6 MG 2 TABLET: 8.6; 5 TABLET, FILM COATED ORAL at 06:26

## 2022-06-02 RX ADMIN — GABAPENTIN 800 MG: 400 CAPSULE ORAL at 11:47

## 2022-06-02 RX ADMIN — GABAPENTIN 800 MG: 400 CAPSULE ORAL at 08:30

## 2022-06-02 RX ADMIN — SULFAMETHOXAZOLE AND TRIMETHOPRIM 1 TABLET: 800; 160 TABLET ORAL at 17:01

## 2022-06-02 RX ADMIN — SULFAMETHOXAZOLE AND TRIMETHOPRIM 1 TABLET: 800; 160 TABLET ORAL at 08:29

## 2022-06-02 ASSESSMENT — ENCOUNTER SYMPTOMS
SPUTUM PRODUCTION: 0
DIZZINESS: 0
MYALGIAS: 0
CLAUDICATION: 0
ABDOMINAL PAIN: 0
DEPRESSION: 0
SHORTNESS OF BREATH: 0
NERVOUS/ANXIOUS: 0
COUGH: 0
HEMOPTYSIS: 0
HEADACHES: 0
WEAKNESS: 0
VOMITING: 0
FEVER: 0
PALPITATIONS: 0
DIARRHEA: 0
NAUSEA: 0
FALLS: 1
CHILLS: 0
FLANK PAIN: 0

## 2022-06-02 ASSESSMENT — COGNITIVE AND FUNCTIONAL STATUS - GENERAL
SUGGESTED CMS G CODE MODIFIER DAILY ACTIVITY: CK
TOILETING: A LOT
DRESSING REGULAR LOWER BODY CLOTHING: A LOT
HELP NEEDED FOR BATHING: A LOT
DRESSING REGULAR UPPER BODY CLOTHING: A LITTLE
DAILY ACTIVITIY SCORE: 17

## 2022-06-02 ASSESSMENT — PAIN DESCRIPTION - PAIN TYPE
TYPE: ACUTE PAIN

## 2022-06-02 ASSESSMENT — ACTIVITIES OF DAILY LIVING (ADL): TOILETING: INDEPENDENT

## 2022-06-02 NOTE — DISCHARGE PLANNING
@1110-  Agency/Facility Name: [facility]    Outcome: Left a voicemail asking if patient was on service prior and if so, if they would accept pt again.     @1318-  Agency/Facility Name: [facility]       Outcome: Left a voicemail asking if patient was on service prior and if so, if they would accept pt again.

## 2022-06-02 NOTE — CONSULTS
"Facial Trauma Consult  [facility]    ID: Karine Ovalle is a 57 y.o. female who presented to the ER for facial pain and swelling after a fall  Being admitted with multiple injuries and medical issues     PMH:   Past Medical History:   Diagnosis Date   • Administrative encounter- RTC ACCESS/ADA PARATRANSIT ELIGIBILITY 6/4/2019   • Anemia    • Arthritis     osteo/hips and back   • At risk for falls    • Chronic back pain    • Dental disorder     lower denture   • Pain 12/04/2018    \"ALL OVER\", 10/10   • Pain 02/04/2019    arthritic pain   • Urinary incontinence     using pads     Medications:   Current Facility-Administered Medications   Medication Dose Route Frequency Provider Last Rate Last Admin   • MD Alert...Vancomycin per Pharmacy   Other Once Bryon Kern M.D.       • vancomycin (VANCOCIN) 2,250 mg in  mL IVPB  25 mg/kg Intravenous Once Bryon Kern M.D. 166.7 mL/hr at 06/01/22 1702 2,250 mg at 06/01/22 1702     Current Outpatient Medications   Medication Sig Dispense Refill   • cephALEXin (KEFLEX) 500 MG Cap Take 1 Capsule by mouth 4 times a day.     • clindamycin (CLEOCIN) 300 MG Cap Take 1 Capsule by mouth in the morning, at noon, and at bedtime.     • Sodium Hypochlorite (DAKINS 0.125%, 1/4 STRENGTH,) 0.125 % Solution Apply 10 mL topically 2 times a day. 473 mL 0   • phentermine 37.5 MG capsule Take 1 capsule by mouth in the morning 90 Capsule 0   • gabapentin (NEURONTIN) 800 MG tablet Take 1 Tablet by mouth 4 times a day. 360 Tablet 3   • methocarbamol (ROBAXIN) 500 MG Tab Take 2 Tablets by mouth 4 times a day as needed (back pain and spasms). Indications: Musculoskeletal Pain 240 Tablet 5   • Naproxen Sodium 220 MG Cap Take 660 mg by mouth 2 times a day as needed (For pain).     • furosemide (LASIX) 40 MG Tab Take 1 Tablet by mouth every day. Indications: Edema 90 Tablet 4   • potassium chloride SA (KDUR) 20 MEQ Tab CR Take 1 Tablet by mouth every day. " 90 Tablet 4   • fluticasone (FLOVENT HFA) 44 MCG/ACT Aerosol Inhale 2 Puffs 2 times a day. Everyday maintenance steroid inhaler 1 Each 11   • VITAMIN D PO Take 1 Capsule by mouth every day.     • ipratropium-albuterol (DUONEB) 0.5-2.5 (3) MG/3ML nebulizer solution Take 3 mL by nebulization every 6 hours as needed for Shortness of Breath. (Patient not taking: Reported on 6/1/2022) 120 Each 11   • Albuterol Sulfate (PROAIR RESPICLICK) 108 (90 Base) MCG/ACT AEROSOL POWDER, BREATH ACTIVATED Inhale 1 Puff every 6 hours as needed (Wheezing). (Patient not taking: Reported on 6/1/2022) 1 Each 5   • morphine ER (MS CONTIN) 15 MG Tab CR tablet Take 15 mg by mouth every 12 hours.     • oxyCODONE-acetaminophen (PERCOCET-10)  MG Tab Take 1 Tablet by mouth 4 times a day.  0   • CALCIUM CARBONATE-VITAMIN D PO Take 1 Tablet by mouth every day. Indications: Low Amount of Calcium in the Blood     • Multiple Vitamins-Minerals (ONE-A-DAY WOMENS 50 PLUS PO) Take 1 tablet by mouth every day.     • ferrous sulfate 325 (65 Fe) MG tablet Take 1 Tab by mouth every morning with breakfast. 30 Tab 1     Allergies:   Allergies   Allergen Reactions   • Tobacco [Nicotiana Tabacum] Shortness of Breath     Cigarette smoke causes SOB, rispatory issues     Surgical history:   Past Surgical History:   Procedure Laterality Date   • HIP ARTHROPLASTY TOTAL Left 2/13/2019    Procedure: HIP ARTHROPLASTY TOTAL, Cabling of intra-operative calcar fracture;  Surgeon: Bryon Levine M.D.;  Location: [facility];  Service: Orthopedics   • CERVICAL FUSION POSTERIOR  12/7/2018    Procedure: CERVICAL FUSION POSTERIOR- STAGE #2 C3-5 AND C3-T1;  Surgeon: Emre Mendoza III, M.D.;  Location: [facility];  Service: Neurosurgery   • CERVICAL LAMINECTOMY POSTERIOR  12/7/2018    Procedure: CERVICAL LAMINECTOMY POSTERIOR C2-T1;  Surgeon: Emre Mendoza III, M.D.;  Location: [facility];  Service: Neurosurgery   • CERVICAL DISK AND  "FUSION ANTERIOR  12/5/2018    Procedure: CERVICAL DISK AND FUSION ANTERIOR-STAGE #1 C4-5;  Surgeon: Emre Mendoza III, M.D.;  Location: SURGERY [facility];  Service: Neurosurgery   • CORPECTOMY  12/5/2018    Procedure: CORPECTOMY;  Surgeon: Emre Mendoza III, M.D.;  Location: SURGERY [facility];  Service: Neurosurgery   • HIP ARTHROPLASTY TOTAL Right 3/20/2018    Procedure: HIP ARTHROPLASTY TOTAL;  Surgeon: Bryon Levine M.D.;  Location: SURGERY [facility];  Service: Orthopedics   • KNEE ARTHROPLASTY TOTAL Right 10/20/2015    Procedure: KNEE ARTHROPLASTY TOTAL;  Surgeon: Bryon Levine M.D.;  Location: SURGERY [facility];  Service:    • APPENDECTOMY LAPAROSCOPIC  3/21/2013    Performed by Dali Queen M.D. at [facility]   • DEBRIDEMENT  5/17/2012    Performed by BRADLEY DYKES at [facility]   • PLASTIC SURGERY  2004    excess skin on arms and legs removed   • MAMMOPLASTY AUGMENTATION Bilateral 2004    breast implants and \"tummy tuck\"   • GASTRIC BYPASS LAPAROSCOPIC  2002    x 2   • ABDOMINAL EXPLORATION     • OTHER      breast augmentation 2004   • OTHER      Gastric bypass 2002     Social history:   Social History     Social History Narrative    ** Merged History Encounter **          Family history:   Family History   Problem Relation Age of Onset   • Diabetes Mother    • Heart Disease Mother        ROS: All systems reviewed and are negative other than those noted in HPI and PMH.    Vitals:  Vitals:    06/01/22 1638   BP: 115/65   Pulse: 88   Resp:    Temp:    SpO2: 90%       Labs:  Recent Labs     06/01/22  1530   WBC 13.6*   RBC 3.15*   HEMOGLOBIN 8.3*   HEMATOCRIT 25.2*   MCV 80.0*   MCH 26.3*   RDW 56.5*   PLATELETCT 561*   MPV 8.8*   NEUTSPOLYS 77.50*   LYMPHOCYTES 16.30*   MONOCYTES 5.10   EOSINOPHILS 0.30   BASOPHILS 0.40     Recent Labs     06/01/22  1530   SODIUM 137   POTASSIUM 3.7   CHLORIDE 104   CO2 24   GLUCOSE 87   BUN 5*       Imaging: " Independent review of CT maxillofacial with the following findings:   Right ZMC fracture with a depressed zygomatic arch    Right OWEN fracture    Nasal fracture    Assessment: 57 y.o. female with multiple midfacial fractures     Plan:    Most fractures are non-op however if patient has any difficulty opening she might benefit from closed reduction of the zygomatic arch as well as possible closed treatment of the nasal fracture or owen   Recommend waiting at least a week for swelling to subside and soft tissue healing.    IF patient still in the hospital can handle while in house. If discharged can work up as an outpatient.       Osorio LOYA MD  [facility]
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
## 2022-06-02 NOTE — PROGRESS NOTES
Received report from previous shift RN.  Assessment complete.  A&O x4. Patient calls appropriately.  Patient up with x2 assist & FWW. Bed alarm on .   Patient has 8/10 pain. Pain managed with prescribed medications.  Denies N&V. Tolerating cardiac diet.  Patient has sutures below R eye, CDI. RLE wound to shin and thigh, DIP, CDI.   + void, + flatus, BM PTA.  Patient denies SOB. Spo2>95% on room air.  Reviewed plan of care with patient. Call light and personal belongings with in reach. Hourly rounding in place. All needs met at this time.
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
## 2022-06-02 NOTE — RESPIRATORY CARE
COPD EDUCATION by COPD CLINICAL EDUCATOR  6/2/2022 at 9:07 AM by Angelita Marshall RRT     Patient reviewed by COPD education team. Patient does not have a diagnosis of COPD, patient has a history of Asthma and never smoked. Therefore the patient does not qualify for the COPD program.

## 2022-06-02 NOTE — PROGRESS NOTES
4 Eyes Skin Assessment Completed by VERO Gaines and VERO Way.    Head Laceration under R eye sutures in place  Ears WDL  Nose Redness laceration with sutures   Mouth WDL  Neck WDL  Breast/Chest Scar bilateral breasts   Shoulder Blades WDL  Spine WDL  (R) Arm/Elbow/Hand Bruising  (L) Arm/Elbow/Hand WDL  Abdomen Scar   Groin WDL  Scrotum/Coccyx/Buttocks WDL  (R) Leg wound DIP to shin and upper thigh   (L) Leg Bruising  (R) Heel/Foot/Toe WDL  (L) Heel/Foot/Toe WDL          Devices In Places Pulse Ox, Tubi  to R leg, purewick       Interventions In Place Pillows and Pressure Redistribution Mattress    Possible Skin Injury Yes    Pictures Uploaded Into Epic Yes  Wound Consult Placed Yes  RN Wound Prevention Protocol Ordered No

## 2022-06-02 NOTE — FACE TO FACE
Face to Face Supporting Documentation - Home Health    The encounter with this patient was in whole or in part the primary reason for home health admission.    Date of encounter:   Patient:                    MRN:                       YOB: 2022  Karine Ovalle  [id number]  1965     Home health to see patient for:  Skilled Nursing care for assessment, interventions & education, Wound Care, Physical Therapy evaluation and treatment and Occupational therapy evaluation and treatment    Skilled need for:  Comment: wound care dressing changes    Skilled nursing interventions to include:  Wound Care    Homebound status evidenced by:  Need the aid of supportive devices such as crutches, canes, wheelchairs or walkers. Leaving home requires a considerable and taxing effort. There is a normal inability to leave the home.    Community Physician to provide follow up care: Micky Rubin M.D.     Optional Interventions? No    I certify the face to face encounter for this home health care referral meets the CMS requirements and the encounter/clinical assessment with the patient was, in whole, or in part, for the medical condition(s) listed above, which is the primary reason for home health care. Based on my clinical findings: the service(s) are medically necessary, support the need for home health care, and the homebound criteria are met.  I certify that this patient has had a face to face encounter by myself.  ANNA Talbot. - NPI: [id number]
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
## 2022-06-02 NOTE — DISCHARGE PLANNING
Care Transition Team Assessment    Information Source  Orientation Level: Oriented X4  Information Given By: Patient  Informant's Name: Karine  Who is responsible for making decisions for patient? : Patient    Readmission Evaluation  Is this a readmission?: No    Elopement Risk  Legal Hold: No    Interdisciplinary Discharge Planning  Patient or legal guardian wants to designate a caregiver: No    Discharge Preparedness  What is your plan after discharge?: Home with help  What are your discharge supports?: Child  Prior Functional Level: Needs Assist with Activities of Daily Living  Difficulity with ADLs: Walking    Functional Assesment  Prior Functional Level: Needs Assist with Activities of Daily Living    Finances  Financial Barriers to Discharge: No  Prescription Coverage: Yes         Values / Beliefs / Concerns  Values / Beliefs Concerns : No    Advance Directive  Advance Directive?: None  Advance Directive offered?: AD Booklet refused    Domestic Abuse  Have you ever been the victim of abuse or violence?: No  Physical Abuse or Sexual Abuse: No  Verbal Abuse or Emotional Abuse: No  Possible Abuse/Neglect Reported to:: Not Applicable    Psychological Assessment  History of Substance Abuse: None  History of Psychiatric Problems: No  Non-compliant with Treatment: No  Newly Diagnosed Illness: No    Discharge Risks or Barriers  Discharge risks or barriers?: No  Patient risk factors: Complex medical needs    Anticipated Discharge Information  Discharge Disposition: Discharged to home/self care (01)
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
## 2022-06-03 ENCOUNTER — APPOINTMENT (OUTPATIENT)
Dept: RADIOLOGY | Facility: MEDICAL CENTER | Age: 57
DRG: 155 | End: 2022-06-03
Attending: STUDENT IN AN ORGANIZED HEALTH CARE EDUCATION/TRAINING PROGRAM
Payer: COMMERCIAL

## 2022-06-03 ENCOUNTER — APPOINTMENT (OUTPATIENT)
Dept: CARDIOLOGY | Facility: MEDICAL CENTER | Age: 57
DRG: 155 | End: 2022-06-03
Attending: STUDENT IN AN ORGANIZED HEALTH CARE EDUCATION/TRAINING PROGRAM

## 2022-06-03 LAB
ANION GAP SERPL CALC-SCNC: 6 MMOL/L (ref 7–16)
BUN SERPL-MCNC: 4 MG/DL (ref 8–22)
CALCIUM SERPL-MCNC: 7.5 MG/DL (ref 8.5–10.5)
CHLORIDE SERPL-SCNC: 105 MMOL/L (ref 96–112)
CO2 SERPL-SCNC: 27 MMOL/L (ref 20–33)
CREAT SERPL-MCNC: 0.6 MG/DL (ref 0.5–1.4)
ERYTHROCYTE [DISTWIDTH] IN BLOOD BY AUTOMATED COUNT: 56.7 FL (ref 35.9–50)
FERRITIN SERPL-MCNC: 426 NG/ML (ref 10–291)
GFR SERPLBLD CREATININE-BSD FMLA CKD-EPI: 105 ML/MIN/1.73 M 2
GLUCOSE SERPL-MCNC: 91 MG/DL (ref 65–99)
HCT VFR BLD AUTO: 23.2 % (ref 37–47)
HGB BLD-MCNC: 7.9 G/DL (ref 12–16)
IRON SATN MFR SERPL: ABNORMAL % (ref 15–55)
IRON SERPL-MCNC: 33 UG/DL (ref 40–170)
LV EJECT FRACT  99904: 65
LV EJECT FRACT MOD 2C 99903: 64.84
LV EJECT FRACT MOD 4C 99902: 31.02
LV EJECT FRACT MOD BP 99901: 52.02
MCH RBC QN AUTO: 27 PG (ref 27–33)
MCHC RBC AUTO-ENTMCNC: 34.1 G/DL (ref 33.6–35)
MCV RBC AUTO: 79.2 FL (ref 81.4–97.8)
PLATELET # BLD AUTO: 510 K/UL (ref 164–446)
PMV BLD AUTO: 8.8 FL (ref 9–12.9)
POTASSIUM SERPL-SCNC: 3.9 MMOL/L (ref 3.6–5.5)
RBC # BLD AUTO: 2.93 M/UL (ref 4.2–5.4)
SODIUM SERPL-SCNC: 138 MMOL/L (ref 135–145)
TIBC SERPL-MCNC: 60 UG/DL (ref 250–450)
UIBC SERPL-MCNC: 27 UG/DL (ref 110–370)
VIT B12 SERPL-MCNC: 623 PG/ML (ref 211–911)
WBC # BLD AUTO: 9.1 K/UL (ref 4.8–10.8)

## 2022-06-03 PROCEDURE — 700102 HCHG RX REV CODE 250 W/ 637 OVERRIDE(OP): Performed by: NURSE PRACTITIONER

## 2022-06-03 PROCEDURE — 99232 SBSQ HOSP IP/OBS MODERATE 35: CPT | Mod: FS | Performed by: NURSE PRACTITIONER

## 2022-06-03 PROCEDURE — A9270 NON-COVERED ITEM OR SERVICE: HCPCS | Performed by: NURSE PRACTITIONER

## 2022-06-03 PROCEDURE — 83540 ASSAY OF IRON: CPT

## 2022-06-03 PROCEDURE — 82607 VITAMIN B-12: CPT

## 2022-06-03 PROCEDURE — 770001 HCHG ROOM/CARE - MED/SURG/GYN PRIV*

## 2022-06-03 PROCEDURE — 700111 HCHG RX REV CODE 636 W/ 250 OVERRIDE (IP): Performed by: STUDENT IN AN ORGANIZED HEALTH CARE EDUCATION/TRAINING PROGRAM

## 2022-06-03 PROCEDURE — 93970 EXTREMITY STUDY: CPT

## 2022-06-03 PROCEDURE — 80048 BASIC METABOLIC PNL TOTAL CA: CPT

## 2022-06-03 PROCEDURE — 93970 EXTREMITY STUDY: CPT | Mod: 26 | Performed by: INTERNAL MEDICINE

## 2022-06-03 PROCEDURE — 36415 COLL VENOUS BLD VENIPUNCTURE: CPT

## 2022-06-03 PROCEDURE — 83550 IRON BINDING TEST: CPT

## 2022-06-03 PROCEDURE — 93306 TTE W/DOPPLER COMPLETE: CPT | Mod: 26 | Performed by: INTERNAL MEDICINE

## 2022-06-03 PROCEDURE — A9270 NON-COVERED ITEM OR SERVICE: HCPCS | Performed by: STUDENT IN AN ORGANIZED HEALTH CARE EDUCATION/TRAINING PROGRAM

## 2022-06-03 PROCEDURE — 97163 PT EVAL HIGH COMPLEX 45 MIN: CPT

## 2022-06-03 PROCEDURE — 700102 HCHG RX REV CODE 250 W/ 637 OVERRIDE(OP): Performed by: STUDENT IN AN ORGANIZED HEALTH CARE EDUCATION/TRAINING PROGRAM

## 2022-06-03 PROCEDURE — 93306 TTE W/DOPPLER COMPLETE: CPT

## 2022-06-03 PROCEDURE — 85027 COMPLETE CBC AUTOMATED: CPT

## 2022-06-03 PROCEDURE — 82728 ASSAY OF FERRITIN: CPT

## 2022-06-03 PROCEDURE — 700101 HCHG RX REV CODE 250: Performed by: NURSE PRACTITIONER

## 2022-06-03 RX ORDER — FOLIC ACID 1 MG/1
1 TABLET ORAL DAILY
Status: COMPLETED | OUTPATIENT
Start: 2022-06-03 | End: 2022-06-09

## 2022-06-03 RX ADMIN — FUROSEMIDE 20 MG: 20 INJECTION, SOLUTION INTRAMUSCULAR; INTRAVENOUS at 04:45

## 2022-06-03 RX ADMIN — MORPHINE SULFATE 15 MG: 15 TABLET, FILM COATED, EXTENDED RELEASE ORAL at 09:19

## 2022-06-03 RX ADMIN — CIPROFLOXACIN HYDROCHLORIDE 500 MG: 500 TABLET, FILM COATED ORAL at 12:52

## 2022-06-03 RX ADMIN — FLUTICASONE PROPIONATE 88 MCG: 44 AEROSOL, METERED RESPIRATORY (INHALATION) at 04:54

## 2022-06-03 RX ADMIN — OXYCODONE AND ACETAMINOPHEN 1 TABLET: 10; 325 TABLET ORAL at 12:52

## 2022-06-03 RX ADMIN — OXYCODONE AND ACETAMINOPHEN 1 TABLET: 10; 325 TABLET ORAL at 16:58

## 2022-06-03 RX ADMIN — OXYCODONE AND ACETAMINOPHEN 1 TABLET: 10; 325 TABLET ORAL at 20:31

## 2022-06-03 RX ADMIN — CELECOXIB 200 MG: 200 CAPSULE ORAL at 04:46

## 2022-06-03 RX ADMIN — ACETAMINOPHEN 650 MG: 325 TABLET ORAL at 12:52

## 2022-06-03 RX ADMIN — SULFAMETHOXAZOLE AND TRIMETHOPRIM 1 TABLET: 800; 160 TABLET ORAL at 04:46

## 2022-06-03 RX ADMIN — MULTIPLE VITAMINS W/ MINERALS TAB 1 TABLET: TAB at 04:45

## 2022-06-03 RX ADMIN — LIDOCAINE HYDROCHLORIDE 1 DOSE: 40 SOLUTION TOPICAL at 18:01

## 2022-06-03 RX ADMIN — FLUTICASONE PROPIONATE 88 MCG: 44 AEROSOL, METERED RESPIRATORY (INHALATION) at 16:59

## 2022-06-03 RX ADMIN — GABAPENTIN 800 MG: 400 CAPSULE ORAL at 16:58

## 2022-06-03 RX ADMIN — OXYCODONE AND ACETAMINOPHEN 1 TABLET: 10; 325 TABLET ORAL at 09:13

## 2022-06-03 RX ADMIN — GABAPENTIN 800 MG: 400 CAPSULE ORAL at 12:52

## 2022-06-03 RX ADMIN — GABAPENTIN 800 MG: 400 CAPSULE ORAL at 20:31

## 2022-06-03 RX ADMIN — MORPHINE SULFATE 15 MG: 15 TABLET, FILM COATED, EXTENDED RELEASE ORAL at 20:31

## 2022-06-03 RX ADMIN — ACETAMINOPHEN 650 MG: 325 TABLET ORAL at 16:58

## 2022-06-03 RX ADMIN — ACETAMINOPHEN 650 MG: 325 TABLET ORAL at 04:46

## 2022-06-03 RX ADMIN — FERROUS SULFATE TAB 325 MG (65 MG ELEMENTAL FE) 325 MG: 325 (65 FE) TAB at 09:13

## 2022-06-03 RX ADMIN — OXYCODONE HYDROCHLORIDE AND ACETAMINOPHEN 500 MG: 500 TABLET ORAL at 04:46

## 2022-06-03 RX ADMIN — GABAPENTIN 800 MG: 400 CAPSULE ORAL at 09:13

## 2022-06-03 RX ADMIN — ENOXAPARIN SODIUM 40 MG: 40 INJECTION SUBCUTANEOUS at 16:58

## 2022-06-03 RX ADMIN — FOLIC ACID 1 MG: 1 TABLET ORAL at 09:13

## 2022-06-03 RX ADMIN — DOCUSATE SODIUM 50 MG AND SENNOSIDES 8.6 MG 2 TABLET: 8.6; 5 TABLET, FILM COATED ORAL at 16:58

## 2022-06-03 RX ADMIN — OXYCODONE HYDROCHLORIDE 10 MG: 10 TABLET ORAL at 04:45

## 2022-06-03 RX ADMIN — SODIUM HYPOCHLORITE 1 ML: 1.25 SOLUTION TOPICAL at 04:47

## 2022-06-03 RX ADMIN — SULFAMETHOXAZOLE AND TRIMETHOPRIM 1 TABLET: 800; 160 TABLET ORAL at 16:58

## 2022-06-03 RX ADMIN — SODIUM HYPOCHLORITE 1 ML: 1.25 SOLUTION TOPICAL at 18:01

## 2022-06-03 RX ADMIN — FUROSEMIDE 20 MG: 20 INJECTION, SOLUTION INTRAMUSCULAR; INTRAVENOUS at 16:59

## 2022-06-03 RX ADMIN — CELECOXIB 200 MG: 200 CAPSULE ORAL at 16:58

## 2022-06-03 ASSESSMENT — ENCOUNTER SYMPTOMS
WEAKNESS: 0
FEVER: 0
ABDOMINAL PAIN: 0
MYALGIAS: 0
CHILLS: 0
SPUTUM PRODUCTION: 0
NERVOUS/ANXIOUS: 0
COUGH: 0
VOMITING: 0
NAUSEA: 0
BLURRED VISION: 0
PALPITATIONS: 0
FLANK PAIN: 0
DIARRHEA: 0
CLAUDICATION: 0
SHORTNESS OF BREATH: 0
HEADACHES: 0
PHOTOPHOBIA: 0
HEMOPTYSIS: 0
DIZZINESS: 0
FALLS: 0
DOUBLE VISION: 0
DEPRESSION: 0

## 2022-06-03 ASSESSMENT — COGNITIVE AND FUNCTIONAL STATUS - GENERAL
STANDING UP FROM CHAIR USING ARMS: A LOT
WALKING IN HOSPITAL ROOM: A LOT
TURNING FROM BACK TO SIDE WHILE IN FLAT BAD: A LOT
CLIMB 3 TO 5 STEPS WITH RAILING: A LOT
MOVING FROM LYING ON BACK TO SITTING ON SIDE OF FLAT BED: A LOT
SUGGESTED CMS G CODE MODIFIER MOBILITY: CL
MOVING TO AND FROM BED TO CHAIR: A LOT
MOBILITY SCORE: 12

## 2022-06-03 ASSESSMENT — PAIN DESCRIPTION - PAIN TYPE
TYPE: ACUTE PAIN

## 2022-06-03 ASSESSMENT — GAIT ASSESSMENTS
ASSISTIVE DEVICE: FRONT WHEEL WALKER
GAIT LEVEL OF ASSIST: MINIMAL ASSIST
DISTANCE (FEET): 1
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
## 2022-06-03 NOTE — PROGRESS NOTES
Pt is A&O 4  Pain + managed with prescribed medications    - nausea  Tolerating a cardiac diet   Incision -  - Drains  + Voids  + flatus  + BM  Up x2, pt refusing to ambulate   SCD's on L leg   Bed alarm on, pt moderate fall risk per maureen myers  Reviewed plan of care with patient, bed in lowest position and locked, pt resting comfortably now, call light within reach, all needs met at this time. Interventions will be executed per plan of care

## 2022-06-03 NOTE — DISCHARGE PLANNING
[facility]ency[facility]Facility Name: [facility]   Spoke To: Roosevelt  Outcome: Per Roosevelt he said the name may be familiar and asked to have the referral sent.     [facility] sending referral.    Received Choice form at 0931  [facility]  Referral sent per Choice form @ 0937    Choice saved in .     @1004-  Ag[facility]Facility Name: [facility]   Spoke To: Pinky   Outcome: Per Pinky she is already on service. Notified she was rolled inpatient and sent over updated order.     @1151-  Received Choice form at 1133  Agency/Facility Name: [facility]   Referral sent per Choice form @ 1152    Choice saved in .      @1559-  [facility]   Outcome: Left Sushma a voicemail asking for referral status.

## 2022-06-03 NOTE — DISCHARGE PLANNING
Spoke with patient who stated that she was under [facility]. Her preference is to go to the wound clinic twice a week and have home health visit her once a week. Telephone call to the Wound Clinic, spoke with Solomon who scheduled a follow up appointment for Monday June 6th at 2:30. She said that they will assess her at that time to see if they can see her twice a week. She is concerned because the patient misses appointments and shows up late for other appointments.      Spoke with patient regarding choices for skilled placement. She does not really want to go, but I explained that she has been dealing with her wounds for a long time now, and she really needs to do something to get this under control. She voiced understanding and is in agreement with the plan to go to skilled.  Referrals sent to local facilities.

## 2022-06-03 NOTE — CARE PLAN
The patient is Stable - Low risk of patient condition declining or worsening    Shift Goals  Clinical Goals: pain control, dressing change, increased activity  Patient Goals: Rest, pain control    Progress made toward(s) clinical / shift goals:  Pt pain medicated per MAR, wound changed per MAR.     Problem: Pain - Standard  Goal: Alleviation of pain or a reduction in pain to the patient’s comfort goal  Outcome: Progressing     Problem: Knowledge Deficit - Standard  Goal: Patient and family/care givers will demonstrate understanding of plan of care, disease process/condition, diagnostic tests and medications  Outcome: Progressing     Problem: Skin Integrity  Goal: Skin integrity is maintained or improved  Outcome: Progressing     Problem: Fall Risk  Goal: Patient will remain free from falls  Outcome: Progressing       Patient is not progressing towards the following goals:    Refusing to participate in OOB activity

## 2022-06-03 NOTE — PROGRESS NOTES
Received report from previous shift RN  Assessment complete.  A&O x 4. Patient calls appropriately.  Patient up with x2 assist & FWW. Bed alarm on..   Patient has 8/10 pain. Pain managed with prescribed medications.  Denies N&V. Tolerating Cardiac diet.  Patient has sutures below R eye, CDI. R shin/thigh wound, DIP, CDI.  + void, + flatus, BM 6/2.  Patient denies SOB. Spo2>95% on room air.  Reviewed plan of care with patient. Call light and personal belongings with in reach. Hourly rounding in place. All needs met at this time.
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
## 2022-06-04 LAB
ANION GAP SERPL CALC-SCNC: 7 MMOL/L (ref 7–16)
BACTERIA WND AEROBE CULT: NORMAL
BUN SERPL-MCNC: 5 MG/DL (ref 8–22)
CALCIUM SERPL-MCNC: 8 MG/DL (ref 8.5–10.5)
CHLORIDE SERPL-SCNC: 104 MMOL/L (ref 96–112)
CO2 SERPL-SCNC: 27 MMOL/L (ref 20–33)
CREAT SERPL-MCNC: 0.52 MG/DL (ref 0.5–1.4)
ERYTHROCYTE [DISTWIDTH] IN BLOOD BY AUTOMATED COUNT: 57.1 FL (ref 35.9–50)
GFR SERPLBLD CREATININE-BSD FMLA CKD-EPI: 108 ML/MIN/1.73 M 2
GLUCOSE SERPL-MCNC: 88 MG/DL (ref 65–99)
GRAM STN SPEC: NORMAL
HCT VFR BLD AUTO: 26.2 % (ref 37–47)
HGB BLD-MCNC: 8.8 G/DL (ref 12–16)
HGB RETIC QN AUTO: 34.5 PG/CELL (ref 29–35)
IMM RETICS NFR: 34.1 % (ref 9.3–17.4)
MCH RBC QN AUTO: 26.8 PG (ref 27–33)
MCHC RBC AUTO-ENTMCNC: 33.6 G/DL (ref 33.6–35)
MCV RBC AUTO: 79.9 FL (ref 81.4–97.8)
PLATELET # BLD AUTO: 549 K/UL (ref 164–446)
PMV BLD AUTO: 8.8 FL (ref 9–12.9)
POTASSIUM SERPL-SCNC: 4.2 MMOL/L (ref 3.6–5.5)
RBC # BLD AUTO: 3.28 M/UL (ref 4.2–5.4)
RETICS # AUTO: 0.09 M/UL (ref 0.04–0.06)
RETICS/RBC NFR: 2.7 % (ref 0.8–2.1)
SIGNIFICANT IND 70042: NORMAL
SITE SITE: NORMAL
SODIUM SERPL-SCNC: 138 MMOL/L (ref 135–145)
SOURCE SOURCE: NORMAL
WBC # BLD AUTO: 8.6 K/UL (ref 4.8–10.8)

## 2022-06-04 PROCEDURE — 82525 ASSAY OF COPPER: CPT

## 2022-06-04 PROCEDURE — 94760 N-INVAS EAR/PLS OXIMETRY 1: CPT

## 2022-06-04 PROCEDURE — 84630 ASSAY OF ZINC: CPT

## 2022-06-04 PROCEDURE — 700102 HCHG RX REV CODE 250 W/ 637 OVERRIDE(OP): Performed by: NURSE PRACTITIONER

## 2022-06-04 PROCEDURE — 94640 AIRWAY INHALATION TREATMENT: CPT

## 2022-06-04 PROCEDURE — 84155 ASSAY OF PROTEIN SERUM: CPT

## 2022-06-04 PROCEDURE — 80048 BASIC METABOLIC PNL TOTAL CA: CPT

## 2022-06-04 PROCEDURE — 700111 HCHG RX REV CODE 636 W/ 250 OVERRIDE (IP): Performed by: NURSE PRACTITIONER

## 2022-06-04 PROCEDURE — 82784 ASSAY IGA/IGD/IGG/IGM EACH: CPT

## 2022-06-04 PROCEDURE — 97602 WOUND(S) CARE NON-SELECTIVE: CPT

## 2022-06-04 PROCEDURE — 770001 HCHG ROOM/CARE - MED/SURG/GYN PRIV*

## 2022-06-04 PROCEDURE — 99232 SBSQ HOSP IP/OBS MODERATE 35: CPT | Performed by: NURSE PRACTITIONER

## 2022-06-04 PROCEDURE — 85046 RETICYTE/HGB CONCENTRATE: CPT

## 2022-06-04 PROCEDURE — 302098 PASTE RING (FLAT): Performed by: NURSE PRACTITIONER

## 2022-06-04 PROCEDURE — 84165 PROTEIN E-PHORESIS SERUM: CPT

## 2022-06-04 PROCEDURE — A9270 NON-COVERED ITEM OR SERVICE: HCPCS | Performed by: NURSE PRACTITIONER

## 2022-06-04 PROCEDURE — 700102 HCHG RX REV CODE 250 W/ 637 OVERRIDE(OP): Performed by: STUDENT IN AN ORGANIZED HEALTH CARE EDUCATION/TRAINING PROGRAM

## 2022-06-04 PROCEDURE — 36415 COLL VENOUS BLD VENIPUNCTURE: CPT

## 2022-06-04 PROCEDURE — 97605 NEG PRS WND THER DME<=50SQCM: CPT

## 2022-06-04 PROCEDURE — A9270 NON-COVERED ITEM OR SERVICE: HCPCS | Performed by: STUDENT IN AN ORGANIZED HEALTH CARE EDUCATION/TRAINING PROGRAM

## 2022-06-04 PROCEDURE — 700111 HCHG RX REV CODE 636 W/ 250 OVERRIDE (IP): Performed by: STUDENT IN AN ORGANIZED HEALTH CARE EDUCATION/TRAINING PROGRAM

## 2022-06-04 PROCEDURE — 86334 IMMUNOFIX E-PHORESIS SERUM: CPT

## 2022-06-04 PROCEDURE — 85027 COMPLETE CBC AUTOMATED: CPT

## 2022-06-04 RX ORDER — HYDROMORPHONE HYDROCHLORIDE 1 MG/ML
0.5 INJECTION, SOLUTION INTRAMUSCULAR; INTRAVENOUS; SUBCUTANEOUS ONCE
Status: ACTIVE | OUTPATIENT
Start: 2022-06-04 | End: 2022-06-05

## 2022-06-04 RX ADMIN — FLUTICASONE PROPIONATE 88 MCG: 44 AEROSOL, METERED RESPIRATORY (INHALATION) at 04:50

## 2022-06-04 RX ADMIN — OXYCODONE HYDROCHLORIDE 10 MG: 10 TABLET ORAL at 00:12

## 2022-06-04 RX ADMIN — ACETAMINOPHEN 650 MG: 325 TABLET ORAL at 18:45

## 2022-06-04 RX ADMIN — GABAPENTIN 800 MG: 400 CAPSULE ORAL at 21:46

## 2022-06-04 RX ADMIN — OXYCODONE AND ACETAMINOPHEN 1 TABLET: 10; 325 TABLET ORAL at 22:02

## 2022-06-04 RX ADMIN — GABAPENTIN 800 MG: 400 CAPSULE ORAL at 12:06

## 2022-06-04 RX ADMIN — ACETAMINOPHEN 650 MG: 325 TABLET ORAL at 04:41

## 2022-06-04 RX ADMIN — GABAPENTIN 800 MG: 400 CAPSULE ORAL at 16:45

## 2022-06-04 RX ADMIN — ACETAMINOPHEN 650 MG: 325 TABLET ORAL at 00:12

## 2022-06-04 RX ADMIN — HYDROMORPHONE HYDROCHLORIDE 0.5 MG: 1 INJECTION, SOLUTION INTRAMUSCULAR; INTRAVENOUS; SUBCUTANEOUS at 16:45

## 2022-06-04 RX ADMIN — OXYCODONE HYDROCHLORIDE AND ACETAMINOPHEN 500 MG: 500 TABLET ORAL at 04:41

## 2022-06-04 RX ADMIN — GABAPENTIN 800 MG: 400 CAPSULE ORAL at 09:44

## 2022-06-04 RX ADMIN — CIPROFLOXACIN HYDROCHLORIDE 500 MG: 500 TABLET, FILM COATED ORAL at 00:12

## 2022-06-04 RX ADMIN — CIPROFLOXACIN HYDROCHLORIDE 500 MG: 500 TABLET, FILM COATED ORAL at 12:06

## 2022-06-04 RX ADMIN — DOCUSATE SODIUM 50 MG AND SENNOSIDES 8.6 MG 2 TABLET: 8.6; 5 TABLET, FILM COATED ORAL at 04:41

## 2022-06-04 RX ADMIN — OXYCODONE AND ACETAMINOPHEN 1 TABLET: 10; 325 TABLET ORAL at 13:39

## 2022-06-04 RX ADMIN — HYDROMORPHONE HYDROCHLORIDE 0.5 MG: 1 INJECTION, SOLUTION INTRAMUSCULAR; INTRAVENOUS; SUBCUTANEOUS at 10:01

## 2022-06-04 RX ADMIN — OXYCODONE AND ACETAMINOPHEN 1 TABLET: 10; 325 TABLET ORAL at 09:43

## 2022-06-04 RX ADMIN — ENOXAPARIN SODIUM 40 MG: 40 INJECTION SUBCUTANEOUS at 18:45

## 2022-06-04 RX ADMIN — FUROSEMIDE 20 MG: 20 INJECTION, SOLUTION INTRAMUSCULAR; INTRAVENOUS at 04:42

## 2022-06-04 RX ADMIN — OXYCODONE AND ACETAMINOPHEN 1 TABLET: 10; 325 TABLET ORAL at 16:45

## 2022-06-04 RX ADMIN — SULFAMETHOXAZOLE AND TRIMETHOPRIM 1 TABLET: 800; 160 TABLET ORAL at 18:46

## 2022-06-04 RX ADMIN — CELECOXIB 200 MG: 200 CAPSULE ORAL at 18:46

## 2022-06-04 RX ADMIN — CELECOXIB 200 MG: 200 CAPSULE ORAL at 04:40

## 2022-06-04 RX ADMIN — FERROUS SULFATE TAB 325 MG (65 MG ELEMENTAL FE) 325 MG: 325 (65 FE) TAB at 09:43

## 2022-06-04 RX ADMIN — CIPROFLOXACIN HYDROCHLORIDE 500 MG: 500 TABLET, FILM COATED ORAL at 22:00

## 2022-06-04 RX ADMIN — MULTIPLE VITAMINS W/ MINERALS TAB 1 TABLET: TAB at 04:41

## 2022-06-04 RX ADMIN — FOLIC ACID 1 MG: 1 TABLET ORAL at 04:40

## 2022-06-04 RX ADMIN — SULFAMETHOXAZOLE AND TRIMETHOPRIM 1 TABLET: 800; 160 TABLET ORAL at 04:41

## 2022-06-04 RX ADMIN — MORPHINE SULFATE 15 MG: 15 TABLET, FILM COATED, EXTENDED RELEASE ORAL at 09:44

## 2022-06-04 RX ADMIN — ACETAMINOPHEN 650 MG: 325 TABLET ORAL at 12:05

## 2022-06-04 RX ADMIN — DOCUSATE SODIUM 50 MG AND SENNOSIDES 8.6 MG 2 TABLET: 8.6; 5 TABLET, FILM COATED ORAL at 18:46

## 2022-06-04 RX ADMIN — FLUTICASONE PROPIONATE 88 MCG: 44 AEROSOL, METERED RESPIRATORY (INHALATION) at 19:06

## 2022-06-04 RX ADMIN — MORPHINE SULFATE 15 MG: 15 TABLET, FILM COATED, EXTENDED RELEASE ORAL at 21:45

## 2022-06-04 ASSESSMENT — PAIN DESCRIPTION - PAIN TYPE
TYPE: ACUTE PAIN
TYPE: SURGICAL PAIN;ACUTE PAIN
TYPE: ACUTE PAIN;SURGICAL PAIN
TYPE: ACUTE PAIN;SURGICAL PAIN
TYPE: ACUTE PAIN;CHRONIC PAIN
TYPE: ACUTE PAIN;CHRONIC PAIN
TYPE: ACUTE PAIN;SURGICAL PAIN
TYPE: ACUTE PAIN

## 2022-06-04 ASSESSMENT — ENCOUNTER SYMPTOMS
BLURRED VISION: 0
ABDOMINAL PAIN: 0
CLAUDICATION: 0
COUGH: 0
CONSTIPATION: 0
HEADACHES: 0
WEAKNESS: 0
NAUSEA: 0
HEMOPTYSIS: 0
DIARRHEA: 0
FOCAL WEAKNESS: 0
MYALGIAS: 0
VOMITING: 0
SHORTNESS OF BREATH: 0
DEPRESSION: 0
SENSORY CHANGE: 0
DIZZINESS: 0
FALLS: 0
CHILLS: 0
PHOTOPHOBIA: 0
SPUTUM PRODUCTION: 0
FLANK PAIN: 0
DOUBLE VISION: 0
FEVER: 0
PALPITATIONS: 0
BLOOD IN STOOL: 0
NERVOUS/ANXIOUS: 0

## 2022-06-04 NOTE — CARE PLAN
The patient is Watcher - Medium risk of patient condition declining or worsening    Shift Goals  Clinical Goals: pain control, oob activity,wound care  Patient Goals: Rest, pain control    Progress made toward(s) clinical / shift goals: assess pain Q2-4H, patient receiving scheduled percocet; patient to EOB with PT, wound care completed     Patient is not progressing towards the following goals:      Problem: Pain - Standard  Goal: Alleviation of pain or a reduction in pain to the patient’s comfort goal  Outcome: Progressing     Problem: Fall Risk  Goal: Patient will remain free from falls  Outcome: Progressing

## 2022-06-04 NOTE — DISCHARGE PLANNING
Per RN CM, [facility] faxed a blanket SNF referral to [facility].    @[phone]  Agency/Facility Name: [facility]  Spoke To: Chente  Outcome: Per Chente, pt will remain pending until she doesn't have a wound vac anymore and is off the vanco IV.

## 2022-06-04 NOTE — DISCHARGE PLANNING
Patient has been denied at two skilled facilities so far. [facility] request sent to other local facilities.     [facility] has accepted the patient. Bed pending.
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
## 2022-06-04 NOTE — CARE PLAN
Problem: Pain - S  Problem: Knowledge Deficit - Standard  Goal: Patient and family/care givers will demonstrate understanding of plan of care, disease process/condition, diagnostic tests and medications  Outcome: Progressing  Note: Discussed plan of care with patient. Patient demonstrates and verbalizes understanding    tandard  Goal: Alleviation of pain or a reduction in pain to the patient’s comfort goal  Outcome: Progressing  Note: Patient has acute on chronic pain. Medicating with scheduled and PRN medications   The patient is Stable - Low risk of patient condition declining or worsening    Shift Goals  Clinical Goals: Self care, motivation for health maintenance  Patient Goals: rest, pain control    Progress made toward(s) clinical / shift goals:      Patient is not progressing towards the following goals: Patient declines getting up OOB or performing self care activities

## 2022-06-04 NOTE — CARE PLAN
Problem: Pain - Standard  Goal: Alleviation of pain or a reduction in pain to the patient’s comfort goal  Outcome: Progressing     Problem: Knowledge Deficit - Standard  Goal: Patient and family/care givers will demonstrate understanding of plan of care, disease process/condition, diagnostic tests and medications  Outcome: Progressing     The patient is Watcher - Medium risk of patient condition declining or worsening    Shift Goals  Clinical Goals: pain control, OOB activity  Patient Goals: rest, pain control    Progress made toward(s) clinical / shift goals:  Patient reported pain to be managed with scheduled and PRN medications. Educated on pharmacological and non pharmacological modalities.     Patient is not progressing towards the following goals: Patient declined ambulation during this shift.

## 2022-06-04 NOTE — PROGRESS NOTES
Report received from RN, assumed care at 0645  Pt is A0X4, and responds appropriately   Pt declines any SOB, chest pain, new onset of numbness/ tingiling  Pt rates pain at 9/10, on a scale of 1-10, pt medicated per MAR  Pt is voiding adequatly and without hesitancy into purewick. Refuses to be OOB for bathroom  Pt has + flatus, + bowel sounds, + BM on 6/2  Pt refuses ambulation   Pt is tolerating a diet, pt denies any nausea/vomiting  RLE wounds dressed. Plan today is to apply veraflo woundvac per wound RN.   Facial lac from fall, right eye sclera red. Vision intact.   Plan of care discussed, all questions answered. Explained importance of calling before getting OOB and pt verbalizes understanding. Explained importance of oral care. Call light is within reach, treaded slipper socks on, bed in lowest/ locked position, hourly rounding in place, all needs met at this time   Bed alarm on and set to 0 seconds

## 2022-06-04 NOTE — PROGRESS NOTES
Assumed care of patient at 1845. Bedside report received. Assessment complete.  AA&Ox4. Denies CP/SOB.  Reporting 8/10 pain. Medicated per MAR with scheduled medications.   Educated patient regarding pharmacologic and non pharmacologic modalities for pain management.  Skin per flowsheet.  Tolerating carfiac diet. Denies N/V at this time.  + void via purewick. - BM. Last BM 6/2.  Pt ambulates x1-2 assist with FWW.  All needs met at this time. Call light within reach. Bed alarm on and audible. Pt calls appropriately. Bed low and locked, non skid socks in place. Hourly rounding in place.

## 2022-06-05 LAB
ANION GAP SERPL CALC-SCNC: 5 MMOL/L (ref 7–16)
BUN SERPL-MCNC: 6 MG/DL (ref 8–22)
CALCIUM SERPL-MCNC: 7.9 MG/DL (ref 8.5–10.5)
CHLORIDE SERPL-SCNC: 105 MMOL/L (ref 96–112)
CO2 SERPL-SCNC: 25 MMOL/L (ref 20–33)
CREAT SERPL-MCNC: 0.59 MG/DL (ref 0.5–1.4)
ERYTHROCYTE [DISTWIDTH] IN BLOOD BY AUTOMATED COUNT: 58.4 FL (ref 35.9–50)
GFR SERPLBLD CREATININE-BSD FMLA CKD-EPI: 105 ML/MIN/1.73 M 2
GLUCOSE SERPL-MCNC: 86 MG/DL (ref 65–99)
HCT VFR BLD AUTO: 24.7 % (ref 37–47)
HGB BLD-MCNC: 8.4 G/DL (ref 12–16)
MCH RBC QN AUTO: 27.4 PG (ref 27–33)
MCHC RBC AUTO-ENTMCNC: 34 G/DL (ref 33.6–35)
MCV RBC AUTO: 80.5 FL (ref 81.4–97.8)
PLATELET # BLD AUTO: 526 K/UL (ref 164–446)
PMV BLD AUTO: 8.7 FL (ref 9–12.9)
POTASSIUM SERPL-SCNC: 4.4 MMOL/L (ref 3.6–5.5)
RBC # BLD AUTO: 3.07 M/UL (ref 4.2–5.4)
SODIUM SERPL-SCNC: 135 MMOL/L (ref 135–145)
WBC # BLD AUTO: 7.5 K/UL (ref 4.8–10.8)

## 2022-06-05 PROCEDURE — 36415 COLL VENOUS BLD VENIPUNCTURE: CPT

## 2022-06-05 PROCEDURE — A9270 NON-COVERED ITEM OR SERVICE: HCPCS | Performed by: NURSE PRACTITIONER

## 2022-06-05 PROCEDURE — 700111 HCHG RX REV CODE 636 W/ 250 OVERRIDE (IP): Performed by: STUDENT IN AN ORGANIZED HEALTH CARE EDUCATION/TRAINING PROGRAM

## 2022-06-05 PROCEDURE — 99232 SBSQ HOSP IP/OBS MODERATE 35: CPT | Performed by: NURSE PRACTITIONER

## 2022-06-05 PROCEDURE — 85027 COMPLETE CBC AUTOMATED: CPT

## 2022-06-05 PROCEDURE — 700102 HCHG RX REV CODE 250 W/ 637 OVERRIDE(OP): Performed by: NURSE PRACTITIONER

## 2022-06-05 PROCEDURE — A9270 NON-COVERED ITEM OR SERVICE: HCPCS | Performed by: STUDENT IN AN ORGANIZED HEALTH CARE EDUCATION/TRAINING PROGRAM

## 2022-06-05 PROCEDURE — 700102 HCHG RX REV CODE 250 W/ 637 OVERRIDE(OP): Performed by: STUDENT IN AN ORGANIZED HEALTH CARE EDUCATION/TRAINING PROGRAM

## 2022-06-05 PROCEDURE — 80048 BASIC METABOLIC PNL TOTAL CA: CPT

## 2022-06-05 PROCEDURE — 770001 HCHG ROOM/CARE - MED/SURG/GYN PRIV*

## 2022-06-05 RX ADMIN — OXYCODONE AND ACETAMINOPHEN 1 TABLET: 10; 325 TABLET ORAL at 16:28

## 2022-06-05 RX ADMIN — CIPROFLOXACIN HYDROCHLORIDE 500 MG: 500 TABLET, FILM COATED ORAL at 12:15

## 2022-06-05 RX ADMIN — ACETAMINOPHEN 650 MG: 325 TABLET ORAL at 12:15

## 2022-06-05 RX ADMIN — DOCUSATE SODIUM 50 MG AND SENNOSIDES 8.6 MG 2 TABLET: 8.6; 5 TABLET, FILM COATED ORAL at 05:15

## 2022-06-05 RX ADMIN — FOLIC ACID 1 MG: 1 TABLET ORAL at 05:16

## 2022-06-05 RX ADMIN — GABAPENTIN 800 MG: 400 CAPSULE ORAL at 20:00

## 2022-06-05 RX ADMIN — MULTIPLE VITAMINS W/ MINERALS TAB 1 TABLET: TAB at 05:16

## 2022-06-05 RX ADMIN — MORPHINE SULFATE 15 MG: 15 TABLET, FILM COATED, EXTENDED RELEASE ORAL at 08:57

## 2022-06-05 RX ADMIN — OXYCODONE HYDROCHLORIDE AND ACETAMINOPHEN 500 MG: 500 TABLET ORAL at 05:16

## 2022-06-05 RX ADMIN — GABAPENTIN 800 MG: 400 CAPSULE ORAL at 16:28

## 2022-06-05 RX ADMIN — GABAPENTIN 800 MG: 400 CAPSULE ORAL at 12:15

## 2022-06-05 RX ADMIN — MORPHINE SULFATE 15 MG: 15 TABLET, FILM COATED, EXTENDED RELEASE ORAL at 20:00

## 2022-06-05 RX ADMIN — OXYCODONE AND ACETAMINOPHEN 1 TABLET: 10; 325 TABLET ORAL at 12:15

## 2022-06-05 RX ADMIN — FUROSEMIDE 20 MG: 20 INJECTION, SOLUTION INTRAMUSCULAR; INTRAVENOUS at 16:28

## 2022-06-05 RX ADMIN — DOCUSATE SODIUM 50 MG AND SENNOSIDES 8.6 MG 2 TABLET: 8.6; 5 TABLET, FILM COATED ORAL at 17:34

## 2022-06-05 RX ADMIN — GABAPENTIN 800 MG: 400 CAPSULE ORAL at 08:57

## 2022-06-05 RX ADMIN — FLUTICASONE PROPIONATE 88 MCG: 44 AEROSOL, METERED RESPIRATORY (INHALATION) at 05:21

## 2022-06-05 RX ADMIN — SULFAMETHOXAZOLE AND TRIMETHOPRIM 1 TABLET: 800; 160 TABLET ORAL at 17:34

## 2022-06-05 RX ADMIN — SULFAMETHOXAZOLE AND TRIMETHOPRIM 1 TABLET: 800; 160 TABLET ORAL at 05:16

## 2022-06-05 RX ADMIN — FERROUS SULFATE TAB 325 MG (65 MG ELEMENTAL FE) 325 MG: 325 (65 FE) TAB at 08:57

## 2022-06-05 RX ADMIN — FLUTICASONE PROPIONATE 88 MCG: 44 AEROSOL, METERED RESPIRATORY (INHALATION) at 17:35

## 2022-06-05 RX ADMIN — ACETAMINOPHEN 650 MG: 325 TABLET ORAL at 00:48

## 2022-06-05 RX ADMIN — ACETAMINOPHEN 650 MG: 325 TABLET ORAL at 05:16

## 2022-06-05 RX ADMIN — CELECOXIB 200 MG: 200 CAPSULE ORAL at 17:34

## 2022-06-05 RX ADMIN — ENOXAPARIN SODIUM 40 MG: 40 INJECTION SUBCUTANEOUS at 17:34

## 2022-06-05 RX ADMIN — OXYCODONE AND ACETAMINOPHEN 1 TABLET: 10; 325 TABLET ORAL at 08:57

## 2022-06-05 RX ADMIN — OXYCODONE HYDROCHLORIDE 10 MG: 10 TABLET ORAL at 17:34

## 2022-06-05 RX ADMIN — OXYCODONE HYDROCHLORIDE 10 MG: 10 TABLET ORAL at 00:49

## 2022-06-05 RX ADMIN — OXYCODONE AND ACETAMINOPHEN 1 TABLET: 10; 325 TABLET ORAL at 20:00

## 2022-06-05 RX ADMIN — CELECOXIB 200 MG: 200 CAPSULE ORAL at 05:16

## 2022-06-05 ASSESSMENT — PAIN DESCRIPTION - PAIN TYPE
TYPE: ACUTE PAIN

## 2022-06-05 ASSESSMENT — ENCOUNTER SYMPTOMS
DOUBLE VISION: 0
FEVER: 0
CLAUDICATION: 0
WEAKNESS: 0
FALLS: 0
ABDOMINAL PAIN: 0
BLURRED VISION: 0
MYALGIAS: 0
PALPITATIONS: 0
DIARRHEA: 0
COUGH: 0
SENSORY CHANGE: 0
HEMOPTYSIS: 0
CONSTIPATION: 0
NERVOUS/ANXIOUS: 0
SHORTNESS OF BREATH: 0
BLOOD IN STOOL: 0
SPUTUM PRODUCTION: 0
DIZZINESS: 0
NAUSEA: 0
DEPRESSION: 0
CHILLS: 0
FOCAL WEAKNESS: 0
HEADACHES: 0
FLANK PAIN: 0
PHOTOPHOBIA: 0
VOMITING: 0

## 2022-06-05 NOTE — DISCHARGE PLANNING
Agency/Facility Name: [facility]  Spoke To: Neris  Outcome: No female beds at this time.    Agency/Facility Name: [facility]  Spoke To: Marta  Outcome: No one is in admissions today.      GERMAN Holguin notified via Teams.
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
## 2022-06-05 NOTE — CARE PLAN
Problem: Knowledge Deficit - Standard  Goal: Patient and family/care givers will demonstrate understanding of plan of care, disease process/condition, diagnostic tests and medications  Outcome: Progressing     Problem: Urinary - Renal Perfusion  Goal: Ability to achieve and maintain adequate renal perfusion and functioning will improve  Outcome: Progressing     Problem: Pain - Standard  Goal: Alleviation of pain or a reduction in pain to the patient’s comfort goal  Outcome: Progressing     Problem: Respiratory  Goal: Patient will achieve/maintain optimum respiratory ventilation and gas exchange  Outcome: Progressing     The patient is Stable - Low risk of patient condition declining or worsening    Shift Goals  Clinical Goals: Pain control  Patient Goals: mobilize    Progress made toward(s) clinical / shift goals:  Pain controlled on orals. POC reviewed with pt; pt verbalizes understanding, can be argumentative over regimen, education provided    Patient is not progressing towards the following goals:

## 2022-06-06 ENCOUNTER — APPOINTMENT (OUTPATIENT)
Dept: WOUND CARE | Facility: MEDICAL CENTER | Age: 57
End: 2022-06-06
Attending: NURSE PRACTITIONER
Payer: COMMERCIAL

## 2022-06-06 LAB
ANION GAP SERPL CALC-SCNC: 7 MMOL/L (ref 7–16)
BACTERIA BLD CULT: NORMAL
BACTERIA BLD CULT: NORMAL
BUN SERPL-MCNC: 8 MG/DL (ref 8–22)
CALCIUM SERPL-MCNC: 8 MG/DL (ref 8.5–10.5)
CHLORIDE SERPL-SCNC: 103 MMOL/L (ref 96–112)
CO2 SERPL-SCNC: 24 MMOL/L (ref 20–33)
CREAT SERPL-MCNC: 0.67 MG/DL (ref 0.5–1.4)
ERYTHROCYTE [DISTWIDTH] IN BLOOD BY AUTOMATED COUNT: 58.8 FL (ref 35.9–50)
GFR SERPLBLD CREATININE-BSD FMLA CKD-EPI: 102 ML/MIN/1.73 M 2
GLUCOSE SERPL-MCNC: 94 MG/DL (ref 65–99)
HCT VFR BLD AUTO: 25.5 % (ref 37–47)
HGB BLD-MCNC: 8.8 G/DL (ref 12–16)
MCH RBC QN AUTO: 27.8 PG (ref 27–33)
MCHC RBC AUTO-ENTMCNC: 34.5 G/DL (ref 33.6–35)
MCV RBC AUTO: 80.4 FL (ref 81.4–97.8)
PLATELET # BLD AUTO: 525 K/UL (ref 164–446)
PMV BLD AUTO: 8.9 FL (ref 9–12.9)
POTASSIUM SERPL-SCNC: 4.5 MMOL/L (ref 3.6–5.5)
RBC # BLD AUTO: 3.17 M/UL (ref 4.2–5.4)
SIGNIFICANT IND 70042: NORMAL
SIGNIFICANT IND 70042: NORMAL
SITE SITE: NORMAL
SITE SITE: NORMAL
SODIUM SERPL-SCNC: 134 MMOL/L (ref 135–145)
SOURCE SOURCE: NORMAL
SOURCE SOURCE: NORMAL
WBC # BLD AUTO: 7.2 K/UL (ref 4.8–10.8)

## 2022-06-06 PROCEDURE — 85027 COMPLETE CBC AUTOMATED: CPT

## 2022-06-06 PROCEDURE — 770001 HCHG ROOM/CARE - MED/SURG/GYN PRIV*

## 2022-06-06 PROCEDURE — A9270 NON-COVERED ITEM OR SERVICE: HCPCS | Performed by: STUDENT IN AN ORGANIZED HEALTH CARE EDUCATION/TRAINING PROGRAM

## 2022-06-06 PROCEDURE — A9270 NON-COVERED ITEM OR SERVICE: HCPCS | Performed by: NURSE PRACTITIONER

## 2022-06-06 PROCEDURE — 700102 HCHG RX REV CODE 250 W/ 637 OVERRIDE(OP): Performed by: NURSE PRACTITIONER

## 2022-06-06 PROCEDURE — 99232 SBSQ HOSP IP/OBS MODERATE 35: CPT | Mod: FS | Performed by: NURSE PRACTITIONER

## 2022-06-06 PROCEDURE — 36415 COLL VENOUS BLD VENIPUNCTURE: CPT

## 2022-06-06 PROCEDURE — 700102 HCHG RX REV CODE 250 W/ 637 OVERRIDE(OP): Performed by: STUDENT IN AN ORGANIZED HEALTH CARE EDUCATION/TRAINING PROGRAM

## 2022-06-06 PROCEDURE — 80048 BASIC METABOLIC PNL TOTAL CA: CPT

## 2022-06-06 PROCEDURE — 700111 HCHG RX REV CODE 636 W/ 250 OVERRIDE (IP): Performed by: STUDENT IN AN ORGANIZED HEALTH CARE EDUCATION/TRAINING PROGRAM

## 2022-06-06 RX ORDER — HYDROMORPHONE HYDROCHLORIDE 1 MG/ML
0.5 INJECTION, SOLUTION INTRAMUSCULAR; INTRAVENOUS; SUBCUTANEOUS
Status: COMPLETED | OUTPATIENT
Start: 2022-06-06 | End: 2022-06-10

## 2022-06-06 RX ORDER — HYDROMORPHONE HYDROCHLORIDE 1 MG/ML
0.5 INJECTION, SOLUTION INTRAMUSCULAR; INTRAVENOUS; SUBCUTANEOUS
Status: COMPLETED | OUTPATIENT
Start: 2022-06-07 | End: 2022-06-07

## 2022-06-06 RX ORDER — HYDROMORPHONE HYDROCHLORIDE 1 MG/ML
0.5 INJECTION, SOLUTION INTRAMUSCULAR; INTRAVENOUS; SUBCUTANEOUS ONCE
Status: DISCONTINUED | OUTPATIENT
Start: 2022-06-06 | End: 2022-06-06

## 2022-06-06 RX ADMIN — DOCUSATE SODIUM 50 MG AND SENNOSIDES 8.6 MG 2 TABLET: 8.6; 5 TABLET, FILM COATED ORAL at 18:03

## 2022-06-06 RX ADMIN — ACETAMINOPHEN 650 MG: 325 TABLET ORAL at 18:04

## 2022-06-06 RX ADMIN — OXYCODONE AND ACETAMINOPHEN 1 TABLET: 10; 325 TABLET ORAL at 20:54

## 2022-06-06 RX ADMIN — MORPHINE SULFATE 15 MG: 15 TABLET, FILM COATED, EXTENDED RELEASE ORAL at 08:40

## 2022-06-06 RX ADMIN — FUROSEMIDE 20 MG: 20 INJECTION, SOLUTION INTRAMUSCULAR; INTRAVENOUS at 18:04

## 2022-06-06 RX ADMIN — CIPROFLOXACIN HYDROCHLORIDE 500 MG: 500 TABLET, FILM COATED ORAL at 00:14

## 2022-06-06 RX ADMIN — DOCUSATE SODIUM 50 MG AND SENNOSIDES 8.6 MG 2 TABLET: 8.6; 5 TABLET, FILM COATED ORAL at 06:31

## 2022-06-06 RX ADMIN — CELECOXIB 200 MG: 200 CAPSULE ORAL at 18:03

## 2022-06-06 RX ADMIN — MULTIPLE VITAMINS W/ MINERALS TAB 1 TABLET: TAB at 06:31

## 2022-06-06 RX ADMIN — FERROUS SULFATE TAB 325 MG (65 MG ELEMENTAL FE) 325 MG: 325 (65 FE) TAB at 08:40

## 2022-06-06 RX ADMIN — OXYCODONE AND ACETAMINOPHEN 1 TABLET: 10; 325 TABLET ORAL at 12:03

## 2022-06-06 RX ADMIN — MORPHINE SULFATE 15 MG: 15 TABLET, FILM COATED, EXTENDED RELEASE ORAL at 20:55

## 2022-06-06 RX ADMIN — GABAPENTIN 800 MG: 400 CAPSULE ORAL at 18:03

## 2022-06-06 RX ADMIN — FUROSEMIDE 20 MG: 20 INJECTION, SOLUTION INTRAMUSCULAR; INTRAVENOUS at 06:32

## 2022-06-06 RX ADMIN — GABAPENTIN 800 MG: 400 CAPSULE ORAL at 08:40

## 2022-06-06 RX ADMIN — OXYCODONE AND ACETAMINOPHEN 1 TABLET: 10; 325 TABLET ORAL at 18:04

## 2022-06-06 RX ADMIN — ACETAMINOPHEN 650 MG: 325 TABLET ORAL at 12:03

## 2022-06-06 RX ADMIN — OXYCODONE HYDROCHLORIDE 10 MG: 10 TABLET ORAL at 01:40

## 2022-06-06 RX ADMIN — OXYCODONE HYDROCHLORIDE AND ACETAMINOPHEN 500 MG: 500 TABLET ORAL at 06:31

## 2022-06-06 RX ADMIN — GABAPENTIN 800 MG: 400 CAPSULE ORAL at 12:03

## 2022-06-06 RX ADMIN — OXYCODONE AND ACETAMINOPHEN 1 TABLET: 10; 325 TABLET ORAL at 08:40

## 2022-06-06 RX ADMIN — CIPROFLOXACIN HYDROCHLORIDE 500 MG: 500 TABLET, FILM COATED ORAL at 23:21

## 2022-06-06 RX ADMIN — FOLIC ACID 1 MG: 1 TABLET ORAL at 06:31

## 2022-06-06 RX ADMIN — ACETAMINOPHEN 650 MG: 325 TABLET ORAL at 00:14

## 2022-06-06 RX ADMIN — ACETAMINOPHEN 650 MG: 325 TABLET ORAL at 06:31

## 2022-06-06 RX ADMIN — FLUTICASONE PROPIONATE 88 MCG: 44 AEROSOL, METERED RESPIRATORY (INHALATION) at 18:03

## 2022-06-06 RX ADMIN — FLUTICASONE PROPIONATE 88 MCG: 44 AEROSOL, METERED RESPIRATORY (INHALATION) at 08:40

## 2022-06-06 RX ADMIN — SULFAMETHOXAZOLE AND TRIMETHOPRIM 1 TABLET: 800; 160 TABLET ORAL at 18:03

## 2022-06-06 RX ADMIN — GABAPENTIN 800 MG: 400 CAPSULE ORAL at 20:55

## 2022-06-06 RX ADMIN — ENOXAPARIN SODIUM 40 MG: 40 INJECTION SUBCUTANEOUS at 18:04

## 2022-06-06 RX ADMIN — SULFAMETHOXAZOLE AND TRIMETHOPRIM 1 TABLET: 800; 160 TABLET ORAL at 06:31

## 2022-06-06 RX ADMIN — METHOCARBAMOL 1000 MG: 500 TABLET ORAL at 06:30

## 2022-06-06 RX ADMIN — CIPROFLOXACIN HYDROCHLORIDE 500 MG: 500 TABLET, FILM COATED ORAL at 12:03

## 2022-06-06 RX ADMIN — CELECOXIB 200 MG: 200 CAPSULE ORAL at 06:31

## 2022-06-06 ASSESSMENT — ENCOUNTER SYMPTOMS
PHOTOPHOBIA: 0
CLAUDICATION: 0
BLOOD IN STOOL: 0
SENSORY CHANGE: 0
BLURRED VISION: 0
PALPITATIONS: 0
SPUTUM PRODUCTION: 0
VOMITING: 0
SHORTNESS OF BREATH: 0
HEMOPTYSIS: 0
ABDOMINAL PAIN: 0
CONSTIPATION: 0
HEADACHES: 0
NERVOUS/ANXIOUS: 0
FOCAL WEAKNESS: 0
DIZZINESS: 0
FEVER: 0
WEAKNESS: 0
NAUSEA: 0
DEPRESSION: 0
DOUBLE VISION: 0
FLANK PAIN: 0
DIARRHEA: 0
CHILLS: 0
COUGH: 0
MYALGIAS: 0
FALLS: 0

## 2022-06-06 ASSESSMENT — PAIN DESCRIPTION - PAIN TYPE
TYPE: ACUTE PAIN
TYPE: SURGICAL PAIN

## 2022-06-06 NOTE — DISCHARGE PLANNING
Agency/Facility Name: [facility]   Spoke To: [place]  Outcome: Patients referral has just been received for review. Will call back once referral has been completely reviewed to discuss further details.

## 2022-06-06 NOTE — DISCHARGE PLANNING
Agency/Facility Name: [facility]   Spoke To: Dominga   Outcome: DPA left voicemail regarding check in on [employer] insurance authorization.     RN CM notified     Agency/Facility Name: [facility]   Spoke To: Dominga   Outcome:Insurance Auth is still being reviewed. Encouraged to follow up tomorrow for further details     RN CM notified
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
## 2022-06-06 NOTE — CARE PLAN
The patient is Stable - Low risk of patient condition declining or worsening    Shift Goals  Clinical Goals: Wound care; Pain management  Patient Goals: Pain management  Family Goals: N/A    Progress made toward(s) clinical / shift goals:    Problem: Pain - Standard  Goal: Alleviation of pain or a reduction in pain to the patient’s comfort goal  Outcome: Progressing     Problem: Knowledge Deficit - Standard  Goal: Patient and family/care givers will demonstrate understanding of plan of care, disease process/condition, diagnostic tests and medications  Outcome: Progressing       Patient is not progressing towards the following goals:

## 2022-06-06 NOTE — DISCHARGE PLANNING
Spoke with Erin from [facility]. They are able to take patients with wounds and wound vacs. They are assessing the patient for admission to their facility.  She was able to speak with the patient who is in agreement with this plan.

## 2022-06-06 NOTE — DISCHARGE PLANNING
Received Choice form at 2319   Agency/Facility Name: [facility]   Referral sent per Choice form @ [phone]

## 2022-06-07 LAB
ANION GAP SERPL CALC-SCNC: 7 MMOL/L (ref 7–16)
BUN SERPL-MCNC: 11 MG/DL (ref 8–22)
CALCIUM SERPL-MCNC: 8.2 MG/DL (ref 8.5–10.5)
CHLORIDE SERPL-SCNC: 102 MMOL/L (ref 96–112)
CO2 SERPL-SCNC: 24 MMOL/L (ref 20–33)
COPPER SERPL-MCNC: 113.9 UG/DL (ref 80–155)
CREAT SERPL-MCNC: 0.85 MG/DL (ref 0.5–1.4)
ERYTHROCYTE [DISTWIDTH] IN BLOOD BY AUTOMATED COUNT: 61.2 FL (ref 35.9–50)
GFR SERPLBLD CREATININE-BSD FMLA CKD-EPI: 80 ML/MIN/1.73 M 2
GLUCOSE SERPL-MCNC: 91 MG/DL (ref 65–99)
HCT VFR BLD AUTO: 27.2 % (ref 37–47)
HGB BLD-MCNC: 9.1 G/DL (ref 12–16)
IGA SERPL-MCNC: 311 MG/DL (ref 68–408)
IGG SERPL-MCNC: 2705 MG/DL (ref 768–1632)
IGM SERPL-MCNC: 107 MG/DL (ref 35–263)
MCH RBC QN AUTO: 27.3 PG (ref 27–33)
MCHC RBC AUTO-ENTMCNC: 33.5 G/DL (ref 33.6–35)
MCV RBC AUTO: 81.7 FL (ref 81.4–97.8)
PLATELET # BLD AUTO: 477 K/UL (ref 164–446)
PMV BLD AUTO: 8.8 FL (ref 9–12.9)
POTASSIUM SERPL-SCNC: 4.5 MMOL/L (ref 3.6–5.5)
RBC # BLD AUTO: 3.33 M/UL (ref 4.2–5.4)
SODIUM SERPL-SCNC: 133 MMOL/L (ref 135–145)
WBC # BLD AUTO: 8 K/UL (ref 4.8–10.8)
ZINC SERPL-MCNC: 74 UG/DL (ref 60–120)

## 2022-06-07 PROCEDURE — 700102 HCHG RX REV CODE 250 W/ 637 OVERRIDE(OP): Performed by: NURSE PRACTITIONER

## 2022-06-07 PROCEDURE — 36415 COLL VENOUS BLD VENIPUNCTURE: CPT

## 2022-06-07 PROCEDURE — 700102 HCHG RX REV CODE 250 W/ 637 OVERRIDE(OP): Performed by: STUDENT IN AN ORGANIZED HEALTH CARE EDUCATION/TRAINING PROGRAM

## 2022-06-07 PROCEDURE — 97597 DBRDMT OPN WND 1ST 20 CM/<: CPT

## 2022-06-07 PROCEDURE — A9270 NON-COVERED ITEM OR SERVICE: HCPCS | Performed by: STUDENT IN AN ORGANIZED HEALTH CARE EDUCATION/TRAINING PROGRAM

## 2022-06-07 PROCEDURE — 770001 HCHG ROOM/CARE - MED/SURG/GYN PRIV*

## 2022-06-07 PROCEDURE — 80048 BASIC METABOLIC PNL TOTAL CA: CPT

## 2022-06-07 PROCEDURE — 85027 COMPLETE CBC AUTOMATED: CPT

## 2022-06-07 PROCEDURE — 700111 HCHG RX REV CODE 636 W/ 250 OVERRIDE (IP): Performed by: NURSE PRACTITIONER

## 2022-06-07 PROCEDURE — A9270 NON-COVERED ITEM OR SERVICE: HCPCS | Performed by: INTERNAL MEDICINE

## 2022-06-07 PROCEDURE — A9270 NON-COVERED ITEM OR SERVICE: HCPCS | Performed by: NURSE PRACTITIONER

## 2022-06-07 PROCEDURE — 700111 HCHG RX REV CODE 636 W/ 250 OVERRIDE (IP): Performed by: STUDENT IN AN ORGANIZED HEALTH CARE EDUCATION/TRAINING PROGRAM

## 2022-06-07 PROCEDURE — 99232 SBSQ HOSP IP/OBS MODERATE 35: CPT | Performed by: INTERNAL MEDICINE

## 2022-06-07 PROCEDURE — 306591 TRAY SUTURE REMOVAL DISP: Performed by: INTERNAL MEDICINE

## 2022-06-07 PROCEDURE — 302098 PASTE RING (FLAT): Performed by: INTERNAL MEDICINE

## 2022-06-07 PROCEDURE — 700101 HCHG RX REV CODE 250: Performed by: NURSE PRACTITIONER

## 2022-06-07 PROCEDURE — 700102 HCHG RX REV CODE 250 W/ 637 OVERRIDE(OP): Performed by: INTERNAL MEDICINE

## 2022-06-07 RX ORDER — FUROSEMIDE 20 MG/1
40 TABLET ORAL
Status: DISCONTINUED | OUTPATIENT
Start: 2022-06-07 | End: 2022-06-18 | Stop reason: HOSPADM

## 2022-06-07 RX ADMIN — GABAPENTIN 800 MG: 400 CAPSULE ORAL at 09:01

## 2022-06-07 RX ADMIN — MULTIPLE VITAMINS W/ MINERALS TAB 1 TABLET: TAB at 05:25

## 2022-06-07 RX ADMIN — SULFAMETHOXAZOLE AND TRIMETHOPRIM 1 TABLET: 800; 160 TABLET ORAL at 17:00

## 2022-06-07 RX ADMIN — FUROSEMIDE 40 MG: 20 TABLET ORAL at 11:51

## 2022-06-07 RX ADMIN — CELECOXIB 200 MG: 200 CAPSULE ORAL at 05:25

## 2022-06-07 RX ADMIN — OXYCODONE AND ACETAMINOPHEN 1 TABLET: 10; 325 TABLET ORAL at 17:01

## 2022-06-07 RX ADMIN — OXYCODONE AND ACETAMINOPHEN 1 TABLET: 10; 325 TABLET ORAL at 12:49

## 2022-06-07 RX ADMIN — FLUTICASONE PROPIONATE 88 MCG: 44 AEROSOL, METERED RESPIRATORY (INHALATION) at 09:01

## 2022-06-07 RX ADMIN — OXYCODONE HYDROCHLORIDE 10 MG: 10 TABLET ORAL at 11:51

## 2022-06-07 RX ADMIN — MORPHINE SULFATE 15 MG: 15 TABLET, FILM COATED, EXTENDED RELEASE ORAL at 21:17

## 2022-06-07 RX ADMIN — FUROSEMIDE 20 MG: 20 INJECTION, SOLUTION INTRAMUSCULAR; INTRAVENOUS at 05:33

## 2022-06-07 RX ADMIN — ACETAMINOPHEN 650 MG: 325 TABLET ORAL at 11:51

## 2022-06-07 RX ADMIN — GABAPENTIN 800 MG: 400 CAPSULE ORAL at 12:49

## 2022-06-07 RX ADMIN — FOLIC ACID 1 MG: 1 TABLET ORAL at 05:30

## 2022-06-07 RX ADMIN — LIDOCAINE HYDROCHLORIDE 1 APPLICATION: 40 SOLUTION TOPICAL at 11:58

## 2022-06-07 RX ADMIN — CIPROFLOXACIN HYDROCHLORIDE 500 MG: 500 TABLET, FILM COATED ORAL at 11:51

## 2022-06-07 RX ADMIN — GABAPENTIN 800 MG: 400 CAPSULE ORAL at 21:17

## 2022-06-07 RX ADMIN — ACETAMINOPHEN 650 MG: 325 TABLET ORAL at 00:17

## 2022-06-07 RX ADMIN — OXYCODONE HYDROCHLORIDE 10 MG: 10 TABLET ORAL at 03:17

## 2022-06-07 RX ADMIN — HYDROMORPHONE HYDROCHLORIDE 0.5 MG: 1 INJECTION, SOLUTION INTRAMUSCULAR; INTRAVENOUS; SUBCUTANEOUS at 13:09

## 2022-06-07 RX ADMIN — MORPHINE SULFATE 15 MG: 15 TABLET, FILM COATED, EXTENDED RELEASE ORAL at 08:17

## 2022-06-07 RX ADMIN — SULFAMETHOXAZOLE AND TRIMETHOPRIM 1 TABLET: 800; 160 TABLET ORAL at 05:26

## 2022-06-07 RX ADMIN — GABAPENTIN 800 MG: 400 CAPSULE ORAL at 17:01

## 2022-06-07 RX ADMIN — FERROUS SULFATE TAB 325 MG (65 MG ELEMENTAL FE) 325 MG: 325 (65 FE) TAB at 09:01

## 2022-06-07 RX ADMIN — OXYCODONE HYDROCHLORIDE AND ACETAMINOPHEN 500 MG: 500 TABLET ORAL at 05:25

## 2022-06-07 RX ADMIN — OXYCODONE AND ACETAMINOPHEN 1 TABLET: 10; 325 TABLET ORAL at 21:17

## 2022-06-07 RX ADMIN — OXYCODONE AND ACETAMINOPHEN 1 TABLET: 10; 325 TABLET ORAL at 09:01

## 2022-06-07 RX ADMIN — CIPROFLOXACIN HYDROCHLORIDE 500 MG: 500 TABLET, FILM COATED ORAL at 23:41

## 2022-06-07 RX ADMIN — ENOXAPARIN SODIUM 40 MG: 40 INJECTION SUBCUTANEOUS at 17:00

## 2022-06-07 ASSESSMENT — PAIN DESCRIPTION - PAIN TYPE
TYPE: SURGICAL PAIN

## 2022-06-07 ASSESSMENT — ENCOUNTER SYMPTOMS
DIZZINESS: 0
FEVER: 0
ABDOMINAL PAIN: 0
CHILLS: 0
DEPRESSION: 0
NERVOUS/ANXIOUS: 0
SHORTNESS OF BREATH: 0
DIARRHEA: 0
WEAKNESS: 1
BLURRED VISION: 0

## 2022-06-07 NOTE — DISCHARGE PLANNING
Pacer placement bandage replaced using aseptic technique, iv and telemetry removed, son took patient belongings home, patient transported to [facility] via ambulance. Received documentation from the patient that she needed the provider to complete. According to the patient, the documentation from her employer and insurance company. The paperwork was completed by Dr. Brothers and faxed by me to the numbers provided by the patient. The paperwork along with the fax confirmations were given back to the patient.

## 2022-06-07 NOTE — DISCHARGE PLANNING
Agency/Facility Name: [facility]   Spoke To: Karyn  Outcome: Per Karyn, they are still working on insurance auth.

## 2022-06-07 NOTE — PROGRESS NOTES
Spiritual Care Note    Patient Information     Patient's Name: Karine Ovalle   MRN: [id number]    YOB: 1965   Age and Gender: 57 y.o. female   Service Area: [facility] Surgery   Room (and Bed): [place] [address]   Ethnicity or Nationality: Black    Primary Language: English   Temple/Spiritual preference: Mosque   Place of Residence: [place]   Clinical Team Present: Nurses   Code Status: Full Code    Date of Admission: 6/1/2022   Length of Stay: 6 days        Spiritual Care Provider Information:   Intern Carson Chaves  [phone]  [email or web address]  Total time : 15 minutes      Request Information  Visited With: Patient  Nature of the Visit: Initial, On shift  General Visit: Yes  Referral From/ Origin of Request: Verbal staff    Religous Needs/Values  Temple Needs Visit  Temple Needs: Prayer    Encounter Information  Observations/Symptoms: Thankfulness, Accepting  Interaction/Conversation: Spoke with pt about her situation and her daria. Pt in good spirits about her plan of care.  Assessment: Need  Need: Seeking Spiritual Assistance and Support  Intended Effects: Promote a Sense of Peace, Daria Affirmation, Convey a Calming Presence  Interventions: Compassionate Presence, Empathetic Listening, Prayer  Outcomes: Spiritual Comfort, Acceptance, Progress with Trust  Plan: Visit Upon Request    Notes:

## 2022-06-07 NOTE — CARE PLAN
Problem: Pain - Standard  Goal: Alleviation of pain or a reduction in pain to the patient’s comfort goal  Outcome: Progressing     Problem: Knowledge Deficit - Standard  Goal: Patient and family/care givers will demonstrate understanding of plan of care, disease process/condition, diagnostic tests and medications  Outcome: Progressing   The patient is Stable - Low risk of patient condition declining or worsening    Shift Goals  Clinical Goals: pain control, wound vac drsg change  Patient Goals: pain control  Family Goals: N/A    Progress made toward(s) clinical / shift goals:  pain controlled, wound vac changed today    Patient is not progressing towards the following goals:

## 2022-06-07 NOTE — CARE PLAN
The patient is Stable - Low risk of patient condition declining or worsening    Shift Goals  Clinical Goals: Wound care; Pain management  Patient Goals: Pain management  Family Goals: N/A    Progress made toward(s) clinical / shift goals:  Pt rested through the night, pain meds given as needed. Pt monitored closely for signs of decline.    Patient is not progressing towards the following goals:    Problem: Pain - Standard  Goal: Alleviation of pain or a reduction in pain to the patient’s comfort goal  Outcome: Progressing     Problem: Knowledge Deficit - Standard  Goal: Patient and family/care givers will demonstrate understanding of plan of care, disease process/condition, diagnostic tests and medications  Outcome: Progressing     Problem: Hemodynamics  Goal: Patient's hemodynamics, fluid balance and neurologic status will be stable or improve  Outcome: Progressing     Problem: Fluid Volume  Goal: Fluid volume balance will be maintained  Outcome: Progressing     Problem: Urinary - Renal Perfusion  Goal: Ability to achieve and maintain adequate renal perfusion and functioning will improve  Outcome: Progressing     Problem: Respiratory  Goal: Patient will achieve/maintain optimum respiratory ventilation and gas exchange  Outcome: Progressing     Problem: Mechanical Ventilation  Goal: Safe management of artificial airway and ventilation  Outcome: Progressing  Goal: Successful weaning off mechanical ventilator, spontaneously maintains adequate gas exchange  Outcome: Progressing  Goal: Patient will be able to express needs and understand communication  Outcome: Progressing     Problem: Physical Regulation  Goal: Diagnostic test results will improve  Outcome: Progressing  Goal: Signs and symptoms of infection will decrease  Outcome: Progressing     Problem: Skin Integrity  Goal: Skin integrity is maintained or improved  Outcome: Progressing     Problem: Fall Risk  Goal: Patient will remain free from falls  Outcome:  Progressing
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
## 2022-06-08 ENCOUNTER — APPOINTMENT (OUTPATIENT)
Dept: MEDICAL GROUP | Age: 57
End: 2022-06-08
Payer: COMMERCIAL

## 2022-06-08 PROBLEM — D89.2 SERUM GAMMA GLOBULIN INCREASED: Status: ACTIVE | Noted: 2022-06-08

## 2022-06-08 PROBLEM — D63.8 ANEMIA, CHRONIC DISEASE: Status: ACTIVE | Noted: 2022-06-02

## 2022-06-08 PROBLEM — L03.115 CELLULITIS OF RIGHT LOWER EXTREMITY: Status: ACTIVE | Noted: 2021-12-31

## 2022-06-08 PROBLEM — S01.81XA FACIAL LACERATION: Status: ACTIVE | Noted: 2022-06-08

## 2022-06-08 LAB
ANION GAP SERPL CALC-SCNC: 7 MMOL/L (ref 7–16)
BUN SERPL-MCNC: 10 MG/DL (ref 8–22)
CALCIUM SERPL-MCNC: 8.3 MG/DL (ref 8.5–10.5)
CHLORIDE SERPL-SCNC: 102 MMOL/L (ref 96–112)
CO2 SERPL-SCNC: 25 MMOL/L (ref 20–33)
CREAT SERPL-MCNC: 0.74 MG/DL (ref 0.5–1.4)
ERYTHROCYTE [DISTWIDTH] IN BLOOD BY AUTOMATED COUNT: 60.1 FL (ref 35.9–50)
GFR SERPLBLD CREATININE-BSD FMLA CKD-EPI: 94 ML/MIN/1.73 M 2
GLUCOSE SERPL-MCNC: 94 MG/DL (ref 65–99)
HCT VFR BLD AUTO: 27 % (ref 37–47)
HGB BLD-MCNC: 9.4 G/DL (ref 12–16)
MCH RBC QN AUTO: 28.2 PG (ref 27–33)
MCHC RBC AUTO-ENTMCNC: 34.8 G/DL (ref 33.6–35)
MCV RBC AUTO: 81.1 FL (ref 81.4–97.8)
PLATELET # BLD AUTO: 473 K/UL (ref 164–446)
PMV BLD AUTO: 8.9 FL (ref 9–12.9)
POTASSIUM SERPL-SCNC: 4.1 MMOL/L (ref 3.6–5.5)
RBC # BLD AUTO: 3.33 M/UL (ref 4.2–5.4)
SODIUM SERPL-SCNC: 134 MMOL/L (ref 135–145)
WBC # BLD AUTO: 7.6 K/UL (ref 4.8–10.8)

## 2022-06-08 PROCEDURE — 99232 SBSQ HOSP IP/OBS MODERATE 35: CPT | Performed by: FAMILY MEDICINE

## 2022-06-08 PROCEDURE — A9270 NON-COVERED ITEM OR SERVICE: HCPCS | Performed by: STUDENT IN AN ORGANIZED HEALTH CARE EDUCATION/TRAINING PROGRAM

## 2022-06-08 PROCEDURE — A9270 NON-COVERED ITEM OR SERVICE: HCPCS | Performed by: NURSE PRACTITIONER

## 2022-06-08 PROCEDURE — 36415 COLL VENOUS BLD VENIPUNCTURE: CPT

## 2022-06-08 PROCEDURE — 700102 HCHG RX REV CODE 250 W/ 637 OVERRIDE(OP): Performed by: STUDENT IN AN ORGANIZED HEALTH CARE EDUCATION/TRAINING PROGRAM

## 2022-06-08 PROCEDURE — 700102 HCHG RX REV CODE 250 W/ 637 OVERRIDE(OP): Performed by: NURSE PRACTITIONER

## 2022-06-08 PROCEDURE — 97530 THERAPEUTIC ACTIVITIES: CPT

## 2022-06-08 PROCEDURE — 770001 HCHG ROOM/CARE - MED/SURG/GYN PRIV*

## 2022-06-08 PROCEDURE — 80048 BASIC METABOLIC PNL TOTAL CA: CPT

## 2022-06-08 PROCEDURE — 700102 HCHG RX REV CODE 250 W/ 637 OVERRIDE(OP): Performed by: INTERNAL MEDICINE

## 2022-06-08 PROCEDURE — A9270 NON-COVERED ITEM OR SERVICE: HCPCS | Performed by: INTERNAL MEDICINE

## 2022-06-08 PROCEDURE — 700111 HCHG RX REV CODE 636 W/ 250 OVERRIDE (IP): Performed by: STUDENT IN AN ORGANIZED HEALTH CARE EDUCATION/TRAINING PROGRAM

## 2022-06-08 PROCEDURE — 85027 COMPLETE CBC AUTOMATED: CPT

## 2022-06-08 RX ADMIN — CIPROFLOXACIN HYDROCHLORIDE 500 MG: 500 TABLET, FILM COATED ORAL at 22:48

## 2022-06-08 RX ADMIN — GABAPENTIN 800 MG: 400 CAPSULE ORAL at 12:12

## 2022-06-08 RX ADMIN — MULTIPLE VITAMINS W/ MINERALS TAB 1 TABLET: TAB at 04:44

## 2022-06-08 RX ADMIN — CIPROFLOXACIN HYDROCHLORIDE 500 MG: 500 TABLET, FILM COATED ORAL at 12:11

## 2022-06-08 RX ADMIN — FERROUS SULFATE TAB 325 MG (65 MG ELEMENTAL FE) 325 MG: 325 (65 FE) TAB at 08:34

## 2022-06-08 RX ADMIN — FLUTICASONE PROPIONATE 88 MCG: 44 AEROSOL, METERED RESPIRATORY (INHALATION) at 17:34

## 2022-06-08 RX ADMIN — FLUTICASONE PROPIONATE 88 MCG: 44 AEROSOL, METERED RESPIRATORY (INHALATION) at 06:58

## 2022-06-08 RX ADMIN — OXYCODONE AND ACETAMINOPHEN 1 TABLET: 10; 325 TABLET ORAL at 12:11

## 2022-06-08 RX ADMIN — GABAPENTIN 800 MG: 400 CAPSULE ORAL at 20:33

## 2022-06-08 RX ADMIN — FOLIC ACID 1 MG: 1 TABLET ORAL at 04:43

## 2022-06-08 RX ADMIN — MORPHINE SULFATE 15 MG: 15 TABLET, FILM COATED, EXTENDED RELEASE ORAL at 08:34

## 2022-06-08 RX ADMIN — OXYCODONE HYDROCHLORIDE AND ACETAMINOPHEN 500 MG: 500 TABLET ORAL at 04:43

## 2022-06-08 RX ADMIN — OXYCODONE AND ACETAMINOPHEN 1 TABLET: 10; 325 TABLET ORAL at 20:36

## 2022-06-08 RX ADMIN — FUROSEMIDE 40 MG: 20 TABLET ORAL at 04:44

## 2022-06-08 RX ADMIN — OXYCODONE AND ACETAMINOPHEN 1 TABLET: 10; 325 TABLET ORAL at 16:19

## 2022-06-08 RX ADMIN — ENOXAPARIN SODIUM 40 MG: 40 INJECTION SUBCUTANEOUS at 17:33

## 2022-06-08 RX ADMIN — SULFAMETHOXAZOLE AND TRIMETHOPRIM 1 TABLET: 800; 160 TABLET ORAL at 04:44

## 2022-06-08 RX ADMIN — OXYCODONE AND ACETAMINOPHEN 1 TABLET: 10; 325 TABLET ORAL at 08:34

## 2022-06-08 RX ADMIN — GABAPENTIN 800 MG: 400 CAPSULE ORAL at 16:19

## 2022-06-08 RX ADMIN — GABAPENTIN 800 MG: 400 CAPSULE ORAL at 08:34

## 2022-06-08 RX ADMIN — OXYCODONE HYDROCHLORIDE 10 MG: 10 TABLET ORAL at 00:25

## 2022-06-08 RX ADMIN — CELECOXIB 200 MG: 200 CAPSULE ORAL at 08:34

## 2022-06-08 RX ADMIN — SULFAMETHOXAZOLE AND TRIMETHOPRIM 1 TABLET: 800; 160 TABLET ORAL at 17:33

## 2022-06-08 RX ADMIN — MORPHINE SULFATE 15 MG: 15 TABLET, FILM COATED, EXTENDED RELEASE ORAL at 20:35

## 2022-06-08 ASSESSMENT — ENCOUNTER SYMPTOMS
FLANK PAIN: 0
VOMITING: 0
DIAPHORESIS: 0
ABDOMINAL PAIN: 0
NAUSEA: 0
DIZZINESS: 0
BACK PAIN: 0
NECK PAIN: 0
DIARRHEA: 0
HEADACHES: 0
FOCAL WEAKNESS: 0
SORE THROAT: 0
BLURRED VISION: 0
SENSORY CHANGE: 0
CHILLS: 0
WHEEZING: 0
PALPITATIONS: 0
WEAKNESS: 1
SPEECH CHANGE: 0
COUGH: 0
NERVOUS/ANXIOUS: 0
SHORTNESS OF BREATH: 0
HEARTBURN: 0
MYALGIAS: 0
FEVER: 0

## 2022-06-08 ASSESSMENT — COGNITIVE AND FUNCTIONAL STATUS - GENERAL
MOBILITY SCORE: 15
MOVING FROM LYING ON BACK TO SITTING ON SIDE OF FLAT BED: A LITTLE
STANDING UP FROM CHAIR USING ARMS: A LITTLE
WALKING IN HOSPITAL ROOM: A LITTLE
MOVING TO AND FROM BED TO CHAIR: A LOT
CLIMB 3 TO 5 STEPS WITH RAILING: A LOT
SUGGESTED CMS G CODE MODIFIER MOBILITY: CK
TURNING FROM BACK TO SIDE WHILE IN FLAT BAD: A LOT

## 2022-06-08 ASSESSMENT — PAIN DESCRIPTION - PAIN TYPE
TYPE: ACUTE PAIN

## 2022-06-08 ASSESSMENT — GAIT ASSESSMENTS
GAIT LEVEL OF ASSIST: STANDBY ASSIST
DEVIATION: ANTALGIC;STEP TO
DISTANCE (FEET): 25
ASSISTIVE DEVICE: FRONT WHEEL WALKER

## 2022-06-08 NOTE — PROGRESS NOTES
Hospital Medicine Daily Progress Note    Date of Service  6/8/2022    Chief Complaint  Karine Ovalle is a 57 y.o. female admitted 6/1/2022 with facial fractures.    Hospital Course  Admitted with facial fractures, facial laceration after a ground-level fall.  Facial laceration was repaired at the ED.  CT scan showed comminuted fracture of the nasal bridge involving bilateral medial orbital walls, there is a fracture of the lateral right orbital wall and ZMC, there is a fracture of the orbital floor, Fracture of the lateral wall of the right maxillary sinus which is depressed, Fracture of the right maxillary spine.  Oral maxillofacial surgery was consulted on the case, recommendation was conservative management.  Patient also with known history of chronic right lower extremity wound, and chronic lymphedema, it appeared to have probable cellulitis.  Patient was initially started on empiric coverage with IV vancomycin and Zosyn.  Cultures were noted to be negative.  Antibiotics were changed to ciprofloxacin and Bactrim.  Wound care was consulted on the case.    Interval Problem Update  Facial fractures - less facial swelling, pain controlled, denies dysphagia  Right leg wound - pain controlled     I have personally seen and examined the patient at bedside. I discussed the plan of care with patient, bedside RN, charge RN and .    Consultants/Specialty  [facility] surgery    Code Status  Full Code    Disposition  Patient is medically cleared for discharge.   Anticipate discharge to to a long-term acute care hospital.  I have placed the appropriate orders for post-discharge needs.    Review of Systems  Review of Systems   Constitutional: Negative for chills, diaphoresis, fever and malaise/fatigue.   HENT: Negative for congestion, hearing loss and sore throat.    Eyes: Negative for blurred vision.   Respiratory: Negative for cough, shortness of breath and wheezing.    Cardiovascular: Positive for leg swelling.  Negative for chest pain and palpitations.   Gastrointestinal: Negative for abdominal pain, diarrhea, heartburn, nausea and vomiting.   Genitourinary: Negative for dysuria, flank pain and hematuria.   Musculoskeletal: Negative for back pain, joint pain, myalgias and neck pain.   Skin: Negative for rash.   Neurological: Positive for weakness. Negative for dizziness, sensory change, speech change, focal weakness and headaches.   Psychiatric/Behavioral: The patient is not nervous/anxious.         Physical Exam  Temp:  [36.6 °C (97.9 °F)-36.7 °C (98.1 °F)] 36.7 °C (98 °F)  Pulse:  [72-98] 72  Resp:  [17-18] 18  BP: (100-116)/(58-74) 116/60  SpO2:  [94 %-95 %] 95 %    Physical Exam  Vitals and nursing note reviewed.   Constitutional:       Appearance: She is obese.   HENT:      Head: Normocephalic.      Comments: Lacerations at right cheek with stitches     Nose: No congestion.      Mouth/Throat:      Mouth: Mucous membranes are moist.   Eyes:      Extraocular Movements: Extraocular movements intact.      Conjunctiva/sclera: Conjunctivae normal.   Cardiovascular:      Rate and Rhythm: Normal rate and regular rhythm.   Pulmonary:      Effort: Pulmonary effort is normal.      Breath sounds: Normal breath sounds.   Abdominal:      General: There is no distension.      Tenderness: There is no abdominal tenderness. There is no guarding or rebound.   Musculoskeletal:      Cervical back: No tenderness.      Right lower leg: Edema present.      Left lower leg: Edema present.      Comments: Wound VAC right leg   Skin:     General: Skin is warm and dry.   Neurological:      General: No focal deficit present.      Mental Status: She is alert and oriented to person, place, and time.      Cranial Nerves: No cranial nerve deficit.         Fluids    Intake/Output Summary (Last 24 hours) at 6/8/2022 1135  Last data filed at 6/8/2022 0320  Gross per 24 hour   Intake 480 ml   Output 2300 ml   Net -1820 ml       Laboratory  Recent Labs      06/06/22  0356 06/07/22  0304 06/08/22  0512   WBC 7.2 8.0 7.6   RBC 3.17* 3.33* 3.33*   HEMOGLOBIN 8.8* 9.1* 9.4*   HEMATOCRIT 25.5* 27.2* 27.0*   MCV 80.4* 81.7 81.1*   MCH 27.8 27.3 28.2   MCHC 34.5 33.5* 34.8   RDW 58.8* 61.2* 60.1*   PLATELETCT 525* 477* 473*   MPV 8.9* 8.8* 8.9*     Recent Labs     06/06/22  0356 06/07/22  0304 06/08/22  0512   SODIUM 134* 133* 134*   POTASSIUM 4.5 4.5 4.1   CHLORIDE 103 102 102   CO2 24 24 25   GLUCOSE 94 91 94   BUN 8 11 10   CREATININE 0.67 0.85 0.74   CALCIUM 8.0* 8.2* 8.3*                   Imaging  EC-ECHOCARDIOGRAM COMPLETE W/O CONT   Final Result      US-EXTREMITY VENOUS LOWER BILAT   Final Result      CT-HEAD W/O   Final Result      1.  Multiple facial fractures as above.   2.  No acute intracranial hemorrhage or mass effect.         CT-MAXILLOFACIAL W/O PLUS RECONS   Final Result      1.  There is a comminuted fracture of the nasal bridge involving bilateral medial orbital walls.   2.  There is a fracture of the lateral right orbital wall and ZMC.   3.  There is a fracture of the orbital floor   4.  Fracture of the lateral wall of the right maxillary sinus which is depressed.   5.  Fracture of the right maxillary spine.   6.  There is thickening of the right lateral rectus muscle. No posterior orbital hematoma. There are a few foci of postorbital gas. There is periorbital swelling on the right.   7.  There is no fracture of the pterygoids. No mandibular fracture.   8.  There is odontogenic disease.      DX-TIBIA AND FIBULA RIGHT   Final Result      1.  Large soft tissue defect along the anterior, proximal shin with no radiographic findings to suggest osteomyelitis.   2.  Diffuse, decreased bone mineral density.           Assessment/Plan  * Multiple closed fractures of facial bone (HCC)- (present on admission)  Assessment & Plan  Pain control    Facial laceration- (present on admission)  Assessment & Plan  Wound care    Fall- (present on admission)  Assessment &  Plan  PT and OT      Open wound of right lower extremity- (present on admission)  Assessment & Plan  Wound care    Cellulitis of right lower extremity- (present on admission)  Assessment & Plan  Ciprofloxacin, Bactrim    Serum gamma globulin increased- (present on admission)  Assessment & Plan  possibly secondary to cellulitis  Follow up as outpatient    Anemia, chronic disease- (present on admission)  Assessment & Plan  Follow cbc    Near syncope- (present on admission)  Assessment & Plan  PT and OT      Asthma- (present on admission)  Assessment & Plan  Flovent, RT protocol    Chronic pain syndrome- (present on admission)  Assessment & Plan  MS Contin, Percocet, Gabapentin    Lymphedema- (present on admission)  Assessment & Plan  Lasix    Hyponatremia- (present on admission)  Assessment & Plan  Follow bmp    Obesity (BMI 30-39.9)- (present on admission)  Assessment & Plan  Body mass index is 34.05 kg/m².    History of gastric bypass- (present on admission)  Assessment & Plan  Nutrition consult           VTE prophylaxis: enoxaparin ppx    I have performed a physical exam and reviewed and updated ROS and Plan today (6/8/2022). In review of yesterday's note (6/7/2022), there are no changes except as documented above.

## 2022-06-08 NOTE — CARE PLAN
Problem: Pain - Standard  Goal: Alleviation of pain or a reduction in pain to the patient’s comfort goal  Outcome: Progressing     Problem: Knowledge Deficit - Standard  Goal: Patient and family/care givers will demonstrate understanding of plan of care, disease process/condition, diagnostic tests and medications  Outcome: Progressing     Problem: Hemodynamics  Goal: Patient's hemodynamics, fluid balance and neurologic status will be stable or improve  Outcome: Progressing     Problem: Fluid Volume  Goal: Fluid volume balance will be maintained  Outcome: Progressing   The patient is Stable - Low risk of patient condition declining or worsening    Shift Goals  Clinical Goals: pain control  Patient Goals: pain control  Family Goals: No family present at this time    Progress made toward(s) clinical / shift goals:  pain control    Patient is not progressing towards the following goals:

## 2022-06-08 NOTE — PROGRESS NOTES
"Assumed care of patient from night shift RN.  Patient is alert and oriented times 4, states pain of 10/10, medicated per MAR.  Patient in contact isolation.  VSS /60   Pulse 72   Temp 36.7 °C (98 °F) (Temporal)   Resp 18   Ht 1.6 m (5' 3\")   Wt 87.2 kg (192 lb 3.9 oz)   LMP  (LMP Unknown)   SpO2 95%   BMI 34.05 kg/m²   PIV in the LAC, patent and saline locked.  On RA with saturations in the mid 90s.  Last BM 6/8, urinating without difficulty.  Purewick in use as patient states she is unable to mobilize.  PT/OT involved.  Cardiac diet, tolerating well.  Ecchymosis to the R eye, sutures in place.  Wound to the RLE with wound vac in place.  Mepilex to the posterior thigh.  Vac change Tuesday/Friday.  POC discussed for the day, bed is locked and in the lowest position, call light is within reach.  All needs are met at this time, hourly rounding is in place.  "

## 2022-06-08 NOTE — DISCHARGE PLANNING
Agency/Facility Name: [facility]   Spoke To: Erin   Outcome: Per Erin insurance auth is still pending. Per Erin they likely do not have a bed available today but may have one tomorrow.

## 2022-06-08 NOTE — CARE PLAN
The patient is Stable - Low risk of patient condition declining or worsening    Shift Goals  Clinical Goals: Pain management/Wound management  Patient Goals: Pain management  Family Goals: No family present    Progress made toward(s) clinical / shift goals:    Problem: Pain - Standard  Goal: Alleviation of pain or a reduction in pain to the patient’s comfort goal  Outcome: Progressing  Note: Patient medicated per MAR, receiving non pharmacological interventions for comfort     Problem: Knowledge Deficit - Standard  Goal: Patient and family/care givers will demonstrate understanding of plan of care, disease process/condition, diagnostic tests and medications  Outcome: Progressing  Note: Patient verbalizes understanding of POC       Problem: Skin Integrity  Goal: Skin integrity is maintained or improved  Outcome: Progressing  Note: Appropriate skin breakdown prevention protocols in place     Problem: Fall Risk  Goal: Patient will remain free from falls  Outcome: Progressing  Note: Patient remains free from falls, appropriate fall risk prevention interventions in place       Patient is not progressing towards the following goals:

## 2022-06-09 LAB
ALBUMIN SERPL ELPH-MCNC: 2.03 G/DL (ref 3.75–5.01)
ALPHA1 GLOB SERPL ELPH-MCNC: 0.48 G/DL (ref 0.19–0.46)
ALPHA2 GLOB SERPL ELPH-MCNC: 0.64 G/DL (ref 0.48–1.05)
ANION GAP SERPL CALC-SCNC: 7 MMOL/L (ref 7–16)
B-GLOBULIN SERPL ELPH-MCNC: 0.69 G/DL (ref 0.48–1.1)
BUN SERPL-MCNC: 10 MG/DL (ref 8–22)
CALCIUM SERPL-MCNC: 7.8 MG/DL (ref 8.5–10.5)
CHLORIDE SERPL-SCNC: 102 MMOL/L (ref 96–112)
CO2 SERPL-SCNC: 25 MMOL/L (ref 20–33)
CREAT SERPL-MCNC: 0.61 MG/DL (ref 0.5–1.4)
ERYTHROCYTE [DISTWIDTH] IN BLOOD BY AUTOMATED COUNT: 60.9 FL (ref 35.9–50)
GAMMA GLOB SERPL ELPH-MCNC: 2.85 G/DL (ref 0.62–1.51)
GFR SERPLBLD CREATININE-BSD FMLA CKD-EPI: 104 ML/MIN/1.73 M 2
GLUCOSE SERPL-MCNC: 92 MG/DL (ref 65–99)
HCT VFR BLD AUTO: 24.8 % (ref 37–47)
HGB BLD-MCNC: 8.4 G/DL (ref 12–16)
INTERPRETATION SERPL IFE-IMP: ABNORMAL
MCH RBC QN AUTO: 27.5 PG (ref 27–33)
MCHC RBC AUTO-ENTMCNC: 33.9 G/DL (ref 33.6–35)
MCV RBC AUTO: 81 FL (ref 81.4–97.8)
MONOCLON BAND OBS SERPL: ABNORMAL
PATHOLOGY STUDY: ABNORMAL
PLATELET # BLD AUTO: 404 K/UL (ref 164–446)
PMV BLD AUTO: 9.1 FL (ref 9–12.9)
POTASSIUM SERPL-SCNC: 4.1 MMOL/L (ref 3.6–5.5)
PROT SERPL-MCNC: 6.7 G/DL (ref 6.3–8.2)
RBC # BLD AUTO: 3.06 M/UL (ref 4.2–5.4)
SODIUM SERPL-SCNC: 134 MMOL/L (ref 135–145)
WBC # BLD AUTO: 7.3 K/UL (ref 4.8–10.8)

## 2022-06-09 PROCEDURE — 97535 SELF CARE MNGMENT TRAINING: CPT

## 2022-06-09 PROCEDURE — 36415 COLL VENOUS BLD VENIPUNCTURE: CPT

## 2022-06-09 PROCEDURE — 80048 BASIC METABOLIC PNL TOTAL CA: CPT

## 2022-06-09 PROCEDURE — 99232 SBSQ HOSP IP/OBS MODERATE 35: CPT | Performed by: FAMILY MEDICINE

## 2022-06-09 PROCEDURE — A9270 NON-COVERED ITEM OR SERVICE: HCPCS | Performed by: NURSE PRACTITIONER

## 2022-06-09 PROCEDURE — 700102 HCHG RX REV CODE 250 W/ 637 OVERRIDE(OP): Performed by: STUDENT IN AN ORGANIZED HEALTH CARE EDUCATION/TRAINING PROGRAM

## 2022-06-09 PROCEDURE — A9270 NON-COVERED ITEM OR SERVICE: HCPCS | Performed by: STUDENT IN AN ORGANIZED HEALTH CARE EDUCATION/TRAINING PROGRAM

## 2022-06-09 PROCEDURE — C9803 HOPD COVID-19 SPEC COLLECT: HCPCS | Performed by: FAMILY MEDICINE

## 2022-06-09 PROCEDURE — 700102 HCHG RX REV CODE 250 W/ 637 OVERRIDE(OP): Performed by: NURSE PRACTITIONER

## 2022-06-09 PROCEDURE — 94640 AIRWAY INHALATION TREATMENT: CPT

## 2022-06-09 PROCEDURE — 770001 HCHG ROOM/CARE - MED/SURG/GYN PRIV*

## 2022-06-09 PROCEDURE — 85027 COMPLETE CBC AUTOMATED: CPT

## 2022-06-09 PROCEDURE — 700111 HCHG RX REV CODE 636 W/ 250 OVERRIDE (IP): Performed by: STUDENT IN AN ORGANIZED HEALTH CARE EDUCATION/TRAINING PROGRAM

## 2022-06-09 PROCEDURE — A9270 NON-COVERED ITEM OR SERVICE: HCPCS | Performed by: INTERNAL MEDICINE

## 2022-06-09 PROCEDURE — 87426 SARSCOV CORONAVIRUS AG IA: CPT

## 2022-06-09 PROCEDURE — 700102 HCHG RX REV CODE 250 W/ 637 OVERRIDE(OP): Performed by: INTERNAL MEDICINE

## 2022-06-09 PROCEDURE — 87400 INFLUENZA A/B EACH AG IA: CPT | Mod: 91

## 2022-06-09 PROCEDURE — 97530 THERAPEUTIC ACTIVITIES: CPT

## 2022-06-09 RX ADMIN — OXYCODONE HYDROCHLORIDE AND ACETAMINOPHEN 500 MG: 500 TABLET ORAL at 04:31

## 2022-06-09 RX ADMIN — OXYCODONE AND ACETAMINOPHEN 1 TABLET: 10; 325 TABLET ORAL at 16:45

## 2022-06-09 RX ADMIN — MORPHINE SULFATE 15 MG: 15 TABLET, FILM COATED, EXTENDED RELEASE ORAL at 20:42

## 2022-06-09 RX ADMIN — MORPHINE SULFATE 15 MG: 15 TABLET, FILM COATED, EXTENDED RELEASE ORAL at 07:47

## 2022-06-09 RX ADMIN — OXYCODONE AND ACETAMINOPHEN 1 TABLET: 10; 325 TABLET ORAL at 12:29

## 2022-06-09 RX ADMIN — OXYCODONE AND ACETAMINOPHEN 1 TABLET: 10; 325 TABLET ORAL at 07:48

## 2022-06-09 RX ADMIN — ENOXAPARIN SODIUM 40 MG: 40 INJECTION SUBCUTANEOUS at 16:46

## 2022-06-09 RX ADMIN — MULTIPLE VITAMINS W/ MINERALS TAB 1 TABLET: TAB at 04:31

## 2022-06-09 RX ADMIN — GABAPENTIN 800 MG: 400 CAPSULE ORAL at 12:29

## 2022-06-09 RX ADMIN — FUROSEMIDE 40 MG: 20 TABLET ORAL at 04:31

## 2022-06-09 RX ADMIN — GABAPENTIN 800 MG: 400 CAPSULE ORAL at 07:47

## 2022-06-09 RX ADMIN — OXYCODONE HYDROCHLORIDE 10 MG: 10 TABLET ORAL at 04:30

## 2022-06-09 RX ADMIN — FLUTICASONE PROPIONATE 88 MCG: 44 AEROSOL, METERED RESPIRATORY (INHALATION) at 16:45

## 2022-06-09 RX ADMIN — FERROUS SULFATE TAB 325 MG (65 MG ELEMENTAL FE) 325 MG: 325 (65 FE) TAB at 07:48

## 2022-06-09 RX ADMIN — OXYCODONE AND ACETAMINOPHEN 1 TABLET: 10; 325 TABLET ORAL at 22:27

## 2022-06-09 RX ADMIN — GABAPENTIN 800 MG: 400 CAPSULE ORAL at 22:27

## 2022-06-09 RX ADMIN — GABAPENTIN 800 MG: 400 CAPSULE ORAL at 16:46

## 2022-06-09 RX ADMIN — DOCUSATE SODIUM 50 MG AND SENNOSIDES 8.6 MG 2 TABLET: 8.6; 5 TABLET, FILM COATED ORAL at 16:46

## 2022-06-09 RX ADMIN — FLUTICASONE PROPIONATE 88 MCG: 44 AEROSOL, METERED RESPIRATORY (INHALATION) at 06:20

## 2022-06-09 RX ADMIN — FOLIC ACID 1 MG: 1 TABLET ORAL at 04:34

## 2022-06-09 ASSESSMENT — ENCOUNTER SYMPTOMS
FLANK PAIN: 0
WHEEZING: 0
DIZZINESS: 0
PALPITATIONS: 0
NECK PAIN: 0
ABDOMINAL PAIN: 0
VOMITING: 0
SORE THROAT: 0
HEADACHES: 0
SENSORY CHANGE: 0
BLURRED VISION: 0
FEVER: 0
FOCAL WEAKNESS: 0
NERVOUS/ANXIOUS: 0
SHORTNESS OF BREATH: 0
WEAKNESS: 1
MYALGIAS: 0
DIARRHEA: 0
HEARTBURN: 0
SPEECH CHANGE: 0
NAUSEA: 0
COUGH: 0
DIAPHORESIS: 0
CHILLS: 0
BACK PAIN: 0

## 2022-06-09 ASSESSMENT — COGNITIVE AND FUNCTIONAL STATUS - GENERAL
SUGGESTED CMS G CODE MODIFIER DAILY ACTIVITY: CK
DRESSING REGULAR LOWER BODY CLOTHING: A LOT
TOILETING: A LITTLE
HELP NEEDED FOR BATHING: A LOT
DAILY ACTIVITIY SCORE: 19

## 2022-06-09 ASSESSMENT — PAIN DESCRIPTION - PAIN TYPE
TYPE: ACUTE PAIN

## 2022-06-09 ASSESSMENT — GAIT ASSESSMENTS: DISTANCE (FEET): 25

## 2022-06-09 NOTE — CARE PLAN
Problem: Pain - Standard  Goal: Alleviation of pain or a reduction in pain to the patient’s comfort goal  Outcome: Progressing     Problem: Knowledge Deficit - Standard  Goal: Patient and family/care givers will demonstrate understanding of plan of care, disease process/condition, diagnostic tests and medications  Outcome: Progressing     Problem: Urinary - Renal Perfusion  Goal: Ability to achieve and maintain adequate renal perfusion and functioning will improve  Outcome: Progressing     Problem: Respiratory  Goal: Patient will achieve/maintain optimum respiratory ventilation and gas exchange  Outcome: Progressing     Problem: Skin Integrity  Goal: Skin integrity is maintained or improved  Outcome: Progressing     Problem: Fall Risk  Goal: Patient will remain free from falls  Outcome: Progressing   The patient is Stable - Low risk of patient condition declining or worsening    Shift Goals  Clinical Goals: Pain control, mobilization with staff  Patient Goals: Pain control, sleep  Family Goals: No family present at this time    Progress made toward(s) clinical / shift goals:  pain control, mobilization with staff    Patient is not progressing towards the following goals:

## 2022-06-09 NOTE — DISCHARGE PLANNING
Spoke with Erin from \[facility]""s who stated that they have received insurance authorization for the patient, but they are now waiting on a bed.     Follow up with Erin, they did not get the anticipated discharges, so there is not a bed available for the patient today.

## 2022-06-09 NOTE — PROGRESS NOTES
Hospital Medicine Daily Progress Note    Date of Service  6/9/2022    Chief Complaint  Karine Ovalle is a 57 y.o. female admitted 6/1/2022 with facial fractures.    Hospital Course  Admitted with facial fractures, facial laceration after a ground-level fall.  Facial laceration was repaired at the ED.  CT scan showed comminuted fracture of the nasal bridge involving bilateral medial orbital walls, there is a fracture of the lateral right orbital wall and ZMC, there is a fracture of the orbital floor, Fracture of the lateral wall of the right maxillary sinus which is depressed, Fracture of the right maxillary spine.  Oral maxillofacial surgery was consulted on the case, recommendation was conservative management.  Patient also with known history of chronic right lower extremity wound, and chronic lymphedema, it appeared to have probable cellulitis.  Patient was initially started on empiric coverage with IV vancomycin and Zosyn.  Cultures were noted to be negative.  Antibiotics were changed to ciprofloxacin and Bactrim.  Wound care was consulted on the case.    Interval Problem Update  Facial fractures - less facial swelling, pain controlled  Right leg wound - pain controlled, discussed case with LPS    I have personally seen and examined the patient at bedside. I discussed the plan of care with patient, bedside RN, charge RN,  and LPS.    Consultants/Specialty  [facility] surgery    Code Status  Full Code    Disposition  Patient is medically cleared for discharge.   Anticipate discharge to to a long-term acute care hospital.  I have placed the appropriate orders for post-discharge needs.    Review of Systems  Review of Systems   Constitutional: Negative for chills, diaphoresis, fever and malaise/fatigue.   HENT: Negative for congestion, hearing loss and sore throat.    Eyes: Negative for blurred vision.   Respiratory: Negative for cough, shortness of breath and wheezing.    Cardiovascular: Positive for leg  swelling. Negative for chest pain and palpitations.   Gastrointestinal: Negative for abdominal pain, diarrhea, heartburn, nausea and vomiting.   Genitourinary: Negative for dysuria, flank pain and hematuria.   Musculoskeletal: Negative for back pain, joint pain, myalgias and neck pain.   Skin: Negative for rash.   Neurological: Positive for weakness. Negative for dizziness, sensory change, speech change, focal weakness and headaches.   Psychiatric/Behavioral: The patient is not nervous/anxious.         Physical Exam  Temp:  [36.5 °C (97.7 °F)-37.6 °C (99.7 °F)] 36.7 °C (98 °F)  Pulse:  [61-99] 61  Resp:  [17-18] 17  BP: (101-128)/(55-80) 111/67  SpO2:  [96 %-98 %] 96 %    Physical Exam  Vitals and nursing note reviewed.   Constitutional:       Appearance: She is obese.   HENT:      Head: Normocephalic.      Comments: Lacerations at right cheek with stitches     Nose: No congestion.      Mouth/Throat:      Mouth: Mucous membranes are moist.   Eyes:      Extraocular Movements: Extraocular movements intact.      Conjunctiva/sclera: Conjunctivae normal.   Cardiovascular:      Rate and Rhythm: Normal rate and regular rhythm.   Pulmonary:      Effort: Pulmonary effort is normal.      Breath sounds: Normal breath sounds.   Abdominal:      General: There is no distension.      Tenderness: There is no abdominal tenderness. There is no guarding or rebound.   Musculoskeletal:      Cervical back: No tenderness.      Right lower leg: Edema present.      Left lower leg: Edema present.      Comments: Wound VAC right leg   Skin:     General: Skin is warm and dry.   Neurological:      General: No focal deficit present.      Mental Status: She is alert and oriented to person, place, and time.      Cranial Nerves: No cranial nerve deficit.         Fluids    Intake/Output Summary (Last 24 hours) at 6/9/2022 1454  Last data filed at 6/9/2022 1249  Gross per 24 hour   Intake 300 ml   Output 2750 ml   Net -2450 ml       Laboratory  Recent  Labs     06/07/22  0304 06/08/22  0512 06/09/22  0155   WBC 8.0 7.6 7.3   RBC 3.33* 3.33* 3.06*   HEMOGLOBIN 9.1* 9.4* 8.4*   HEMATOCRIT 27.2* 27.0* 24.8*   MCV 81.7 81.1* 81.0*   MCH 27.3 28.2 27.5   MCHC 33.5* 34.8 33.9   RDW 61.2* 60.1* 60.9*   PLATELETCT 477* 473* 404   MPV 8.8* 8.9* 9.1     Recent Labs     06/07/22  0304 06/08/22  0512 06/09/22  0155   SODIUM 133* 134* 134*   POTASSIUM 4.5 4.1 4.1   CHLORIDE 102 102 102   CO2 24 25 25   GLUCOSE 91 94 92   BUN 11 10 10   CREATININE 0.85 0.74 0.61   CALCIUM 8.2* 8.3* 7.8*                   Imaging  EC-ECHOCARDIOGRAM COMPLETE W/O CONT   Final Result      US-EXTREMITY VENOUS LOWER BILAT   Final Result      CT-HEAD W/O   Final Result      1.  Multiple facial fractures as above.   2.  No acute intracranial hemorrhage or mass effect.         CT-MAXILLOFACIAL W/O PLUS RECONS   Final Result      1.  There is a comminuted fracture of the nasal bridge involving bilateral medial orbital walls.   2.  There is a fracture of the lateral right orbital wall and ZMC.   3.  There is a fracture of the orbital floor   4.  Fracture of the lateral wall of the right maxillary sinus which is depressed.   5.  Fracture of the right maxillary spine.   6.  There is thickening of the right lateral rectus muscle. No posterior orbital hematoma. There are a few foci of postorbital gas. There is periorbital swelling on the right.   7.  There is no fracture of the pterygoids. No mandibular fracture.   8.  There is odontogenic disease.      DX-TIBIA AND FIBULA RIGHT   Final Result      1.  Large soft tissue defect along the anterior, proximal shin with no radiographic findings to suggest osteomyelitis.   2.  Diffuse, decreased bone mineral density.           Assessment/Plan  * Multiple closed fractures of facial bone (HCC)- (present on admission)  Assessment & Plan  Pain control    Facial laceration- (present on admission)  Assessment & Plan  Wound care    Fall- (present on admission)  Assessment  & Plan  PT and OT      Open wound of right lower extremity- (present on admission)  Assessment & Plan  Wound care    Cellulitis of right lower extremity- (present on admission)  Assessment & Plan  finished course of Ciprofloxacin, Bactrim    Serum gamma globulin increased- (present on admission)  Assessment & Plan  possibly secondary to cellulitis  Follow up as outpatient    Anemia, chronic disease- (present on admission)  Assessment & Plan  Follow cbc    Near syncope- (present on admission)  Assessment & Plan  PT and OT      Asthma- (present on admission)  Assessment & Plan  Flovent, RT protocol    Chronic pain syndrome- (present on admission)  Assessment & Plan  MS Contin, Percocet, Gabapentin    Lymphedema- (present on admission)  Assessment & Plan  Lasix    Hyponatremia- (present on admission)  Assessment & Plan  Follow bmp    Obesity (BMI 30-39.9)- (present on admission)  Assessment & Plan  Body mass index is 34.05 kg/m².    History of gastric bypass- (present on admission)  Assessment & Plan  Nutrition consult         VTE prophylaxis: enoxaparin ppx    I have performed a physical exam and reviewed and updated ROS and Plan today (6/9/2022). In review of yesterday's note (6/8/2022), there are no changes except as documented above.

## 2022-06-09 NOTE — CARE PLAN
The patient is Stable - Low risk of patient condition declining or worsening    Shift Goals  Clinical Goals: pain control, sleep  Patient Goals: pain control, sleep  Family Goals: No family present at this time    Progress made toward(s) clinical / shift goals:    Problem: Pain - Standard  Goal: Alleviation of pain or a reduction in pain to the patient’s comfort goal  Outcome: Progressing     Problem: Knowledge Deficit - Standard  Goal: Patient and family/care givers will demonstrate understanding of plan of care, disease process/condition, diagnostic tests and medications  Outcome: Progressing       Patient is being medicated appropriately per MAR. Patient is able to verbalize plan of care.

## 2022-06-09 NOTE — THERAPY
"Physical Therapy   Daily Treatment     Patient Name: Karine Ovalle  Age:  57 y.o., Sex:  female  Medical Record #: [id number]  Today's Date: 6/8/2022     Precautions  Precautions: Fall Risk;Swallow Precautions ( See Comments)  Comments: RLE wounds, wound vac    Assessment    Patient progressing with functional mobility. She reported she received pain medication from RN just prior to session which was helpful in improving tolerance. She mobilized as detailed below and anticipate she will progress well with continued mobilization and as pain resolves. Will continue to follow.    Plan    Continue current treatment plan.    DC Equipment Recommendations: Unable to determine at this time  Discharge Recommendations: Recommend post-acute placement for additional physical therapy services prior to discharge home      Subjective    \"I need my shoes, the floor is slippery.\"     Objective       06/08/22 8242   Charge Group   Charges  Yes   PT Therapeutic Activities 2  (23 min)   Precautions   Precautions Fall Risk;Swallow Precautions ( See Comments)   Comments RLE wounds, wound vac   Vitals   O2 (LPM) 0   O2 Delivery Device None - Room Air   Pain 0 - 10 Group   Location Leg   Location Orientation Right   Therapist Pain Assessment During Activity;Nurse Notified   Cognition    Cognition / Consciousness X   Level of Consciousness Alert   Comments pleasant, cooperative. decreased insight but anticipate this is baseline   Active ROM Lower Body    Active ROM Lower Body  X   Comments as before, RLE limited by pain   Balance   Sitting Balance (Static) Fair +   Sitting Balance (Dynamic) Fair   Standing Balance (Static) Fair   Standing Balance (Dynamic) Fair -   Weight Shift Sitting Fair   Weight Shift Standing Fair   Skilled Intervention Verbal Cuing;Compensatory Strategies   Comments with FWW, no LOB   Gait Analysis   Gait Level Of Assist Standby Assist   Assistive Device Front Wheel Walker   Distance (Feet) 25   # of Times Distance " was Traveled 1   Deviation Antalgic;Step To   Weight Bearing Status no restrictions   Vision Deficits Impacting Mobility NT   Skilled Intervention Verbal Cuing;Compensatory Strategies   Bed Mobility    Supine to Sit Standby Assist  (HOB elevated)   Sit to Supine   (NT, left sitting EOB)   Scooting Supervised  (seated)   Skilled Intervention Verbal Cuing;Compensatory Strategies   Functional Mobility   Sit to Stand Standby Assist   Transfer Method Stand Step   How much difficulty does the patient currently have...   Turning over in bed (including adjusting bedclothes, sheets and blankets)? 2   Sitting down on and standing up from a chair with arms (e.g., wheelchair, bedside commode, etc.) 3   Moving from lying on back to sitting on the side of the bed? 2   How much help from another person does the patient currently need...   Moving to and from a bed to a chair (including a wheelchair)? 3   Need to walk in a hospital room? 3   Climbing 3-5 steps with a railing? 2   6 clicks Mobility Score 15   Activity Tolerance   Sitting in Chair declined due to low surface   Sitting Edge of Bed 10 min   Standing 3-5 min   Comments limited by pain   Short Term Goals    Short Term Goal # 1 Pt will demo supine<>sit eob w/ hob flat and no rails spv in 6 visits for independence w/ bed mobility   Goal Outcome # 1 Progressing as expected   Short Term Goal # 2 Pt will demo stand step txr eob<>chair w/ fww spv in 6 visits for independence w/ OOB mobility   Goal Outcome # 2 Progressing as expected   Short Term Goal # 3 Pt will demo gait >150' using fww spv in a moving environment in 6 visits for household ambulation.   Goal Outcome # 3 Progressing as expected   Anticipated Discharge Equipment and Recommendations   DC Equipment Recommendations Unable to determine at this time   Discharge Recommendations Recommend post-acute placement for additional physical therapy services prior to discharge home   Interdisciplinary Plan of Care Collaboration    IDT Collaboration with  Nursing   Patient Position at End of Therapy Seated;Edge of Bed;Call Light within Reach;Tray Table within Reach;Phone within Reach   Collaboration Comments RN aware of visit, response   Session Information   Date / Session Number  6/8-2 (2/3, 6/9)

## 2022-06-09 NOTE — PROGRESS NOTES
Report received from RN, assumed care at 1845  Pt is A0X4, and responds appropriately   Pt declines any SOB, chest pain, new onset of numbness/ tingiling  On room air.  Pt rates pain at 8/10, on a scale of 1-10, pt medicated per MAR  Pt is voiding adequatly and without hesitancy, patient using purewick  Pt has + flatus, last BM on 6/8  Pt sates she is unable to ambulate.  Pt is tolerating a cardiac diet, pt denies any nausea/vomiting at this time  Wound to RLE with wound vac in place. Mepilex to posterior thigh.   Plan of care discussed, all questions answered. Explained importance of calling before getting OOB and pt verbalizes understanding. Call light is within reach, treaded slipper socks on, bed in lowest/ locked position, hourly rounding in place, all needs met at this time

## 2022-06-09 NOTE — PROGRESS NOTES
"Assumed care of patient from night shift RN.  Patient is alert and oriented times 4, states pain of 9/10, medicated per MAR.  VSS /67   Pulse 61   Temp 36.7 °C (98 °F) (Temporal)   Resp 17   Ht 1.6 m (5' 3\")   Wt 87.2 kg (192 lb 3.9 oz)   LMP  (LMP Unknown)   SpO2 96%   BMI 34.05 kg/m²   IV removed last night, refusing another.  On RA with saturations in the mid 90s.  Last BM 6/8, urinating without difficulty.  Purewick in use.  Patient encouraged to mobilize to bedside commode with staff.  Cardiac diet, tolerating well.  Ecchymosis and swelling to the R eye, sutures to laceration below.  CDI.  Wound to the posterior R thigh with [place], wound to anterior RLE with [place].  Dressings intact, no leaks noted.  Patient is a 1 person assist with a FWW, demonstrates steady gait, minimal assistance needed.  POC discussed for the day, bed is locked and in the lowest position, call light is within reach.  All needs are met at this time, hourly rounding is in place.  "

## 2022-06-09 NOTE — THERAPY
Occupational Therapy  Daily Treatment     Patient Name: Karine Ovalle  Age:  57 y.o., Sex:  female  Medical Record #: [id number]  Today's Date: 6/9/2022     Precautions: Fall Risk, Swallow Precautions ( See Comments)  Comments: RLE wound vac    Assessment    Pt seen for OT tx to include: bed mobility, transfers, LB dressing, standing grooming. See grid below for details. Much improved participation, functional mobility and LB ADL this session. Pt is progressing towards OT goals, but continues to be below baseline from OT standpoint. Will benefit from continued acute OT.     Plan    Continue current treatment plan.    DC Equipment Recommendations: Tub Transfer Bench  Discharge Recommendations: Recommend post-acute placement for additional occupational therapy services prior to discharge home     Objective       06/09/22 1546   Cognition    Level of Consciousness Alert   Comments Improved participation this session   Balance   Sitting Balance (Static) Good   Sitting Balance (Dynamic) Fair   Standing Balance (Static) Fair -   Standing Balance (Dynamic) Fair -   Weight Shift Sitting Fair   Weight Shift Standing Fair   Comments FWW for standing   Bed Mobility    Supine to Sit Standby Assist  (HOB fully elevated)   Sit to Supine Minimal Assist  (assist for BLE)   Scooting Supervised  (seated)   Activities of Daily Living   Grooming Contact Guard Assist;Standing  (oral care at sink)   Lower Body Dressing Moderate Assist  (able to don L shoe, assist for R; able to doff B)   Toileting   (NT; declined need)   Functional Mobility   Sit to Stand Standby Assist   Bed, Chair, Wheelchair Transfer Contact Guard Assist   Transfer Method Stand Step  (FWW)   Mobility Supine > < EOB, short gait and transfers with FWW   Activity Tolerance   Sitting in Chair declined due to pain   Sitting Edge of Bed 12 min   Standing 8 min   Comments OOB limited by pain, but improving   Short Term Goals   Short Term Goal # 1 Pt will complete ADL  transfers with min A   Goal Outcome # 1 Progressing as expected   Short Term Goal # 2 Pt will complete LB dressing with min A using AE   Goal Outcome # 2 Progressing as expected   Short Term Goal # 3 Pt will complete standing grooming with SBA   Goal Outcome # 3 Progressing as expected

## 2022-06-09 NOTE — PROGRESS NOTES
LPS will sign off patients case. Wound Team will continue to follow until DC.   Updated Dr Boswell

## 2022-06-10 LAB
ANION GAP SERPL CALC-SCNC: 8 MMOL/L (ref 7–16)
BUN SERPL-MCNC: 8 MG/DL (ref 8–22)
CALCIUM SERPL-MCNC: 8.3 MG/DL (ref 8.5–10.5)
CHLORIDE SERPL-SCNC: 103 MMOL/L (ref 96–112)
CO2 SERPL-SCNC: 24 MMOL/L (ref 20–33)
CREAT SERPL-MCNC: 0.49 MG/DL (ref 0.5–1.4)
ERYTHROCYTE [DISTWIDTH] IN BLOOD BY AUTOMATED COUNT: 62.1 FL (ref 35.9–50)
FLUAV AG SPEC QL IA: NEGATIVE
FLUBV AG SPEC QL IA: NEGATIVE
GFR SERPLBLD CREATININE-BSD FMLA CKD-EPI: 110 ML/MIN/1.73 M 2
GLUCOSE SERPL-MCNC: 182 MG/DL (ref 65–99)
HCT VFR BLD AUTO: 26.2 % (ref 37–47)
HGB BLD-MCNC: 8.9 G/DL (ref 12–16)
MCH RBC QN AUTO: 27.6 PG (ref 27–33)
MCHC RBC AUTO-ENTMCNC: 34 G/DL (ref 33.6–35)
MCV RBC AUTO: 81.4 FL (ref 81.4–97.8)
PLATELET # BLD AUTO: 406 K/UL (ref 164–446)
PMV BLD AUTO: 9.4 FL (ref 9–12.9)
POTASSIUM SERPL-SCNC: 3.5 MMOL/L (ref 3.6–5.5)
RBC # BLD AUTO: 3.22 M/UL (ref 4.2–5.4)
SARS-COV+SARS-COV-2 AG RESP QL IA.RAPID: NOTDETECTED
SODIUM SERPL-SCNC: 135 MMOL/L (ref 135–145)
SPECIMEN SOURCE: NORMAL
WBC # BLD AUTO: 7.9 K/UL (ref 4.8–10.8)

## 2022-06-10 PROCEDURE — 700102 HCHG RX REV CODE 250 W/ 637 OVERRIDE(OP): Performed by: FAMILY MEDICINE

## 2022-06-10 PROCEDURE — 700102 HCHG RX REV CODE 250 W/ 637 OVERRIDE(OP): Performed by: INTERNAL MEDICINE

## 2022-06-10 PROCEDURE — 700102 HCHG RX REV CODE 250 W/ 637 OVERRIDE(OP): Performed by: NURSE PRACTITIONER

## 2022-06-10 PROCEDURE — A9270 NON-COVERED ITEM OR SERVICE: HCPCS | Performed by: NURSE PRACTITIONER

## 2022-06-10 PROCEDURE — 700102 HCHG RX REV CODE 250 W/ 637 OVERRIDE(OP): Performed by: STUDENT IN AN ORGANIZED HEALTH CARE EDUCATION/TRAINING PROGRAM

## 2022-06-10 PROCEDURE — A9270 NON-COVERED ITEM OR SERVICE: HCPCS | Performed by: STUDENT IN AN ORGANIZED HEALTH CARE EDUCATION/TRAINING PROGRAM

## 2022-06-10 PROCEDURE — 700111 HCHG RX REV CODE 636 W/ 250 OVERRIDE (IP): Performed by: STUDENT IN AN ORGANIZED HEALTH CARE EDUCATION/TRAINING PROGRAM

## 2022-06-10 PROCEDURE — 36415 COLL VENOUS BLD VENIPUNCTURE: CPT

## 2022-06-10 PROCEDURE — 700111 HCHG RX REV CODE 636 W/ 250 OVERRIDE (IP): Performed by: NURSE PRACTITIONER

## 2022-06-10 PROCEDURE — A9270 NON-COVERED ITEM OR SERVICE: HCPCS | Performed by: FAMILY MEDICINE

## 2022-06-10 PROCEDURE — 97597 DBRDMT OPN WND 1ST 20 CM/<: CPT

## 2022-06-10 PROCEDURE — A9270 NON-COVERED ITEM OR SERVICE: HCPCS | Performed by: INTERNAL MEDICINE

## 2022-06-10 PROCEDURE — 302098 PASTE RING (FLAT): Performed by: FAMILY MEDICINE

## 2022-06-10 PROCEDURE — 770001 HCHG ROOM/CARE - MED/SURG/GYN PRIV*

## 2022-06-10 PROCEDURE — 306591 TRAY SUTURE REMOVAL DISP: Performed by: FAMILY MEDICINE

## 2022-06-10 PROCEDURE — 80048 BASIC METABOLIC PNL TOTAL CA: CPT

## 2022-06-10 PROCEDURE — 99232 SBSQ HOSP IP/OBS MODERATE 35: CPT | Performed by: FAMILY MEDICINE

## 2022-06-10 PROCEDURE — 85027 COMPLETE CBC AUTOMATED: CPT

## 2022-06-10 RX ORDER — POTASSIUM CHLORIDE 20 MEQ/1
20 TABLET, EXTENDED RELEASE ORAL DAILY
Status: DISCONTINUED | OUTPATIENT
Start: 2022-06-10 | End: 2022-06-18 | Stop reason: HOSPADM

## 2022-06-10 RX ADMIN — OXYCODONE AND ACETAMINOPHEN 1 TABLET: 10; 325 TABLET ORAL at 08:35

## 2022-06-10 RX ADMIN — CELECOXIB 200 MG: 200 CAPSULE ORAL at 16:35

## 2022-06-10 RX ADMIN — GABAPENTIN 800 MG: 400 CAPSULE ORAL at 17:16

## 2022-06-10 RX ADMIN — MORPHINE SULFATE 15 MG: 15 TABLET, FILM COATED, EXTENDED RELEASE ORAL at 08:35

## 2022-06-10 RX ADMIN — OXYCODONE AND ACETAMINOPHEN 1 TABLET: 10; 325 TABLET ORAL at 13:25

## 2022-06-10 RX ADMIN — GABAPENTIN 800 MG: 400 CAPSULE ORAL at 13:25

## 2022-06-10 RX ADMIN — MULTIPLE VITAMINS W/ MINERALS TAB 1 TABLET: TAB at 05:09

## 2022-06-10 RX ADMIN — OXYCODONE AND ACETAMINOPHEN 1 TABLET: 10; 325 TABLET ORAL at 17:16

## 2022-06-10 RX ADMIN — FUROSEMIDE 40 MG: 20 TABLET ORAL at 05:08

## 2022-06-10 RX ADMIN — FLUTICASONE PROPIONATE 88 MCG: 44 AEROSOL, METERED RESPIRATORY (INHALATION) at 18:44

## 2022-06-10 RX ADMIN — GABAPENTIN 800 MG: 400 CAPSULE ORAL at 08:35

## 2022-06-10 RX ADMIN — MORPHINE SULFATE 15 MG: 15 TABLET, FILM COATED, EXTENDED RELEASE ORAL at 20:28

## 2022-06-10 RX ADMIN — OXYCODONE HYDROCHLORIDE 10 MG: 10 TABLET ORAL at 12:28

## 2022-06-10 RX ADMIN — OXYCODONE HYDROCHLORIDE AND ACETAMINOPHEN 500 MG: 500 TABLET ORAL at 05:09

## 2022-06-10 RX ADMIN — OXYCODONE AND ACETAMINOPHEN 1 TABLET: 10; 325 TABLET ORAL at 20:28

## 2022-06-10 RX ADMIN — HYDROMORPHONE HYDROCHLORIDE 0.5 MG: 1 INJECTION, SOLUTION INTRAMUSCULAR; INTRAVENOUS; SUBCUTANEOUS at 14:43

## 2022-06-10 RX ADMIN — POTASSIUM CHLORIDE 20 MEQ: 1500 TABLET, EXTENDED RELEASE ORAL at 08:36

## 2022-06-10 RX ADMIN — GABAPENTIN 800 MG: 400 CAPSULE ORAL at 20:28

## 2022-06-10 RX ADMIN — FERROUS SULFATE TAB 325 MG (65 MG ELEMENTAL FE) 325 MG: 325 (65 FE) TAB at 08:38

## 2022-06-10 RX ADMIN — FLUTICASONE PROPIONATE 88 MCG: 44 AEROSOL, METERED RESPIRATORY (INHALATION) at 05:10

## 2022-06-10 RX ADMIN — ENOXAPARIN SODIUM 40 MG: 40 INJECTION SUBCUTANEOUS at 17:16

## 2022-06-10 RX ADMIN — OXYCODONE HYDROCHLORIDE 10 MG: 10 TABLET ORAL at 23:39

## 2022-06-10 RX ADMIN — OXYCODONE HYDROCHLORIDE 10 MG: 10 TABLET ORAL at 01:57

## 2022-06-10 ASSESSMENT — ENCOUNTER SYMPTOMS
WEAKNESS: 1
COUGH: 0
DIAPHORESIS: 0
HEADACHES: 0
PALPITATIONS: 0
SPEECH CHANGE: 0
NAUSEA: 0
ABDOMINAL PAIN: 0
WHEEZING: 0
HEARTBURN: 0
FOCAL WEAKNESS: 0
SENSORY CHANGE: 0
BLURRED VISION: 0
SORE THROAT: 0
FEVER: 0
CHILLS: 0
BACK PAIN: 0
NECK PAIN: 0
DIARRHEA: 0
SHORTNESS OF BREATH: 0
NERVOUS/ANXIOUS: 0
VOMITING: 0
DIZZINESS: 0
FLANK PAIN: 0
MYALGIAS: 0

## 2022-06-10 ASSESSMENT — PAIN DESCRIPTION - PAIN TYPE
TYPE: ACUTE PAIN

## 2022-06-10 NOTE — PROGRESS NOTES
Assumed care of patient at 0700. Patient is alert and oriented, respirations are unlabored and regular on room air. Patient states pain at a 10, appropriate pain medication administered. Lungs sounds clear throughout. Abdomen soft, non tender, +bowel sounds, multiple loose BM. Voiding with purewick. Wound vac to RLE, intact, patient states urine is leaking into wound vac. Called wound team to assess vac today. Right face with sutures, edema. Bed in lowest position, call light within reach, bed pad changed, patient states no needs at this time.

## 2022-06-10 NOTE — CARE PLAN
The patient is Stable - Low risk of patient condition declining or worsening    Shift Goals  Clinical Goals: pain control,rest  Patient Goals: pain control, sleep  Family Goals: No family present at this time    Progress made toward(s) clinical / shift goals:    Problem: Pain - Standard  Goal: Alleviation of pain or a reduction in pain to the patient’s comfort goal  Outcome: Progressing     Problem: Knowledge Deficit - Standard  Goal: Patient and family/care givers will demonstrate understanding of plan of care, disease process/condition, diagnostic tests and medications  Outcome: Progressing       Patient is aware and able to verbalize understanding of her plan of care. Patient being medicated for pain per MAR.

## 2022-06-10 NOTE — PROGRESS NOTES
Report received from RN, assumed care at 1845  Pt is A0X4, and responds appropriately   Pt declines any SOB, chest pain, new onset of numbness/ tingiling  Pt rates pain at 8/10, on a scale of 1-10, pt medicated per MAR  Pt is voiding adequatly and without hesitancy, pure wick in place  Patietn encourage to mobilze to bedside commode, but expresses she cannot do this  Pt has + flatus, + bowel sounds, multiple bowel movements this evening  Pt ambulates with a fww, and a 1 person assist. Patient refused mobilization this evening.   Pt is tolerating a cardiac diet, pt denies any nausea/vomiting  Skin per flowsheet  Plan of care discussed, all questions answered. Explained importance of calling before getting OOB and pt verbalizes understanding. Call light is within reach, treaded slipper socks on, bed in lowest/ locked position, hourly rounding in place, all needs met at this time

## 2022-06-10 NOTE — PROGRESS NOTES
Hospital Medicine Daily Progress Note    Date of Service  6/10/2022    Chief Complaint  Karine Ovalle is a 57 y.o. female admitted 6/1/2022 with facial fractures.    Hospital Course  Admitted with facial fractures, facial laceration after a ground-level fall.  Facial laceration was repaired at the ED.  CT scan showed comminuted fracture of the nasal bridge involving bilateral medial orbital walls, there is a fracture of the lateral right orbital wall and ZMC, there is a fracture of the orbital floor, Fracture of the lateral wall of the right maxillary sinus which is depressed, Fracture of the right maxillary spine.  Oral maxillofacial surgery was consulted on the case, recommendation was conservative management.  Patient also with known history of chronic right lower extremity wound, and chronic lymphedema, it appeared to have probable cellulitis.  Patient was initially started on empiric coverage with IV vancomycin and Zosyn.  Cultures were noted to be negative.  Antibiotics were changed to ciprofloxacin and Bactrim.  Wound care was consulted on the case.    Interval Problem Update  Facial fractures - less facial swelling, pain controlled  Right leg wound - pain controlled  Low potassium    I have personally seen and examined the patient at bedside. I discussed the plan of care with patient, bedside RN, charge RN and .    Consultants/Specialty  OMF surgery, LPS    Code Status  Full Code    Disposition  Patient is medically cleared for discharge.   Anticipate discharge to to a long-term acute care hospital.  I have placed the appropriate orders for post-discharge needs.    Review of Systems  Review of Systems   Constitutional: Negative for chills, diaphoresis, fever and malaise/fatigue.   HENT: Negative for congestion, hearing loss and sore throat.    Eyes: Negative for blurred vision.   Respiratory: Negative for cough, shortness of breath and wheezing.    Cardiovascular: Positive for leg swelling.  Negative for chest pain and palpitations.   Gastrointestinal: Negative for abdominal pain, diarrhea, heartburn, nausea and vomiting.   Genitourinary: Negative for dysuria, flank pain and hematuria.   Musculoskeletal: Negative for back pain, joint pain, myalgias and neck pain.   Skin: Negative for rash.   Neurological: Positive for weakness. Negative for dizziness, sensory change, speech change, focal weakness and headaches.   Psychiatric/Behavioral: The patient is not nervous/anxious.         Physical Exam  Temp:  [36.7 °C (98 °F)-38 °C (100.4 °F)] 36.8 °C (98.3 °F)  Pulse:  [86-95] 95  Resp:  [18-20] 18  BP: (101-123)/(62-72) 106/68  SpO2:  [94 %-97 %] 94 %    Physical Exam  Vitals and nursing note reviewed.   Constitutional:       Appearance: She is obese.   HENT:      Head: Normocephalic.      Comments: Lacerations at right cheek with stitches     Nose: No congestion.      Mouth/Throat:      Mouth: Mucous membranes are moist.   Eyes:      Extraocular Movements: Extraocular movements intact.      Conjunctiva/sclera: Conjunctivae normal.   Cardiovascular:      Rate and Rhythm: Normal rate and regular rhythm.   Pulmonary:      Effort: Pulmonary effort is normal.      Breath sounds: Normal breath sounds.   Abdominal:      General: There is no distension.      Tenderness: There is no abdominal tenderness. There is no guarding or rebound.   Musculoskeletal:      Cervical back: No tenderness.      Right lower leg: Edema present.      Left lower leg: Edema present.      Comments: Wound VAC right leg   Skin:     General: Skin is warm and dry.   Neurological:      General: No focal deficit present.      Mental Status: She is alert and oriented to person, place, and time.      Cranial Nerves: No cranial nerve deficit.         Fluids    Intake/Output Summary (Last 24 hours) at 6/10/2022 1446  Last data filed at 6/10/2022 0900  Gross per 24 hour   Intake 240 ml   Output 2500 ml   Net -2260 ml       Laboratory  Recent Labs      06/08/22  0512 06/09/22  0155 06/10/22  0032   WBC 7.6 7.3 7.9   RBC 3.33* 3.06* 3.22*   HEMOGLOBIN 9.4* 8.4* 8.9*   HEMATOCRIT 27.0* 24.8* 26.2*   MCV 81.1* 81.0* 81.4   MCH 28.2 27.5 27.6   MCHC 34.8 33.9 34.0   RDW 60.1* 60.9* 62.1*   PLATELETCT 473* 404 406   MPV 8.9* 9.1 9.4     Recent Labs     06/08/22  0512 06/09/22  0155 06/10/22  0032   SODIUM 134* 134* 135   POTASSIUM 4.1 4.1 3.5*   CHLORIDE 102 102 103   CO2 25 25 24   GLUCOSE 94 92 182*   BUN 10 10 8   CREATININE 0.74 0.61 0.49*   CALCIUM 8.3* 7.8* 8.3*                   Imaging  EC-ECHOCARDIOGRAM COMPLETE W/O CONT   Final Result      US-EXTREMITY VENOUS LOWER BILAT   Final Result      CT-HEAD W/O   Final Result      1.  Multiple facial fractures as above.   2.  No acute intracranial hemorrhage or mass effect.         CT-MAXILLOFACIAL W/O PLUS RECONS   Final Result      1.  There is a comminuted fracture of the nasal bridge involving bilateral medial orbital walls.   2.  There is a fracture of the lateral right orbital wall and ZMC.   3.  There is a fracture of the orbital floor   4.  Fracture of the lateral wall of the right maxillary sinus which is depressed.   5.  Fracture of the right maxillary spine.   6.  There is thickening of the right lateral rectus muscle. No posterior orbital hematoma. There are a few foci of postorbital gas. There is periorbital swelling on the right.   7.  There is no fracture of the pterygoids. No mandibular fracture.   8.  There is odontogenic disease.      DX-TIBIA AND FIBULA RIGHT   Final Result      1.  Large soft tissue defect along the anterior, proximal shin with no radiographic findings to suggest osteomyelitis.   2.  Diffuse, decreased bone mineral density.           Assessment/Plan  * Multiple closed fractures of facial bone (HCC)- (present on admission)  Assessment & Plan  Pain control    Facial laceration- (present on admission)  Assessment & Plan  Wound care    Fall- (present on admission)  Assessment &  Plan  PT and OT      Open wound of right lower extremity- (present on admission)  Assessment & Plan  Wound care    Cellulitis of right lower extremity- (present on admission)  Assessment & Plan  finished course of Ciprofloxacin, Bactrim    Serum gamma globulin increased- (present on admission)  Assessment & Plan  possibly secondary to cellulitis  Follow up as outpatient    Anemia, chronic disease- (present on admission)  Assessment & Plan  Follow cbc    Near syncope- (present on admission)  Assessment & Plan  PT and OT      Asthma- (present on admission)  Assessment & Plan  Flovent, RT protocol    Chronic pain syndrome- (present on admission)  Assessment & Plan  MS Contin, Percocet, Gabapentin    Hypokalemia  Assessment & Plan  Start Kdur, follow bmp, Mg, Ph    Lymphedema- (present on admission)  Assessment & Plan  Lasix    Hyponatremia- (present on admission)  Assessment & Plan  Follow bmp    Obesity (BMI 30-39.9)- (present on admission)  Assessment & Plan  Body mass index is 34.05 kg/m².    History of gastric bypass- (present on admission)  Assessment & Plan  Nutrition consult         VTE prophylaxis: enoxaparin ppx    I have performed a physical exam and reviewed and updated ROS and Plan today (6/10/2022). In review of yesterday's note (6/9/2022), there are no changes except as documented above.

## 2022-06-10 NOTE — DISCHARGE PLANNING
Follow up with Erin from [facility] who stated that their discharges are still up in the air and nothing definite for today yet.

## 2022-06-11 PROBLEM — E83.42 HYPOMAGNESEMIA: Status: ACTIVE | Noted: 2022-06-11

## 2022-06-11 LAB
ANION GAP SERPL CALC-SCNC: 8 MMOL/L (ref 7–16)
BUN SERPL-MCNC: 9 MG/DL (ref 8–22)
CALCIUM SERPL-MCNC: 8.3 MG/DL (ref 8.5–10.5)
CHLORIDE SERPL-SCNC: 100 MMOL/L (ref 96–112)
CO2 SERPL-SCNC: 24 MMOL/L (ref 20–33)
CREAT SERPL-MCNC: 0.58 MG/DL (ref 0.5–1.4)
ERYTHROCYTE [DISTWIDTH] IN BLOOD BY AUTOMATED COUNT: 62.2 FL (ref 35.9–50)
GFR SERPLBLD CREATININE-BSD FMLA CKD-EPI: 105 ML/MIN/1.73 M 2
GLUCOSE SERPL-MCNC: 141 MG/DL (ref 65–99)
HCT VFR BLD AUTO: 26.1 % (ref 37–47)
HGB BLD-MCNC: 8.9 G/DL (ref 12–16)
MAGNESIUM SERPL-MCNC: 1.7 MG/DL (ref 1.5–2.5)
MCH RBC QN AUTO: 28.1 PG (ref 27–33)
MCHC RBC AUTO-ENTMCNC: 34.1 G/DL (ref 33.6–35)
MCV RBC AUTO: 82.3 FL (ref 81.4–97.8)
PHOSPHATE SERPL-MCNC: 3.8 MG/DL (ref 2.5–4.5)
PLATELET # BLD AUTO: 419 K/UL (ref 164–446)
PMV BLD AUTO: 9.2 FL (ref 9–12.9)
POTASSIUM SERPL-SCNC: 3.7 MMOL/L (ref 3.6–5.5)
RBC # BLD AUTO: 3.17 M/UL (ref 4.2–5.4)
SODIUM SERPL-SCNC: 132 MMOL/L (ref 135–145)
WBC # BLD AUTO: 8.9 K/UL (ref 4.8–10.8)

## 2022-06-11 PROCEDURE — 85027 COMPLETE CBC AUTOMATED: CPT

## 2022-06-11 PROCEDURE — 700102 HCHG RX REV CODE 250 W/ 637 OVERRIDE(OP): Performed by: FAMILY MEDICINE

## 2022-06-11 PROCEDURE — 36415 COLL VENOUS BLD VENIPUNCTURE: CPT

## 2022-06-11 PROCEDURE — 83735 ASSAY OF MAGNESIUM: CPT

## 2022-06-11 PROCEDURE — 94640 AIRWAY INHALATION TREATMENT: CPT

## 2022-06-11 PROCEDURE — A9270 NON-COVERED ITEM OR SERVICE: HCPCS | Performed by: FAMILY MEDICINE

## 2022-06-11 PROCEDURE — 700102 HCHG RX REV CODE 250 W/ 637 OVERRIDE(OP): Performed by: INTERNAL MEDICINE

## 2022-06-11 PROCEDURE — A9270 NON-COVERED ITEM OR SERVICE: HCPCS | Performed by: NURSE PRACTITIONER

## 2022-06-11 PROCEDURE — A9270 NON-COVERED ITEM OR SERVICE: HCPCS | Performed by: INTERNAL MEDICINE

## 2022-06-11 PROCEDURE — 80048 BASIC METABOLIC PNL TOTAL CA: CPT

## 2022-06-11 PROCEDURE — 700102 HCHG RX REV CODE 250 W/ 637 OVERRIDE(OP): Performed by: NURSE PRACTITIONER

## 2022-06-11 PROCEDURE — A9270 NON-COVERED ITEM OR SERVICE: HCPCS | Performed by: STUDENT IN AN ORGANIZED HEALTH CARE EDUCATION/TRAINING PROGRAM

## 2022-06-11 PROCEDURE — 99232 SBSQ HOSP IP/OBS MODERATE 35: CPT | Performed by: FAMILY MEDICINE

## 2022-06-11 PROCEDURE — 84100 ASSAY OF PHOSPHORUS: CPT

## 2022-06-11 PROCEDURE — 700111 HCHG RX REV CODE 636 W/ 250 OVERRIDE (IP): Performed by: STUDENT IN AN ORGANIZED HEALTH CARE EDUCATION/TRAINING PROGRAM

## 2022-06-11 PROCEDURE — 770001 HCHG ROOM/CARE - MED/SURG/GYN PRIV*

## 2022-06-11 PROCEDURE — 700102 HCHG RX REV CODE 250 W/ 637 OVERRIDE(OP): Performed by: STUDENT IN AN ORGANIZED HEALTH CARE EDUCATION/TRAINING PROGRAM

## 2022-06-11 RX ORDER — ACETAMINOPHEN 325 MG/1
650 TABLET ORAL EVERY 6 HOURS PRN
Status: DISCONTINUED | OUTPATIENT
Start: 2022-06-11 | End: 2022-06-18 | Stop reason: HOSPADM

## 2022-06-11 RX ORDER — LANOLIN ALCOHOL/MO/W.PET/CERES
400 CREAM (GRAM) TOPICAL 2 TIMES DAILY
Status: DISCONTINUED | OUTPATIENT
Start: 2022-06-11 | End: 2022-06-18 | Stop reason: HOSPADM

## 2022-06-11 RX ADMIN — POTASSIUM CHLORIDE 20 MEQ: 1500 TABLET, EXTENDED RELEASE ORAL at 05:36

## 2022-06-11 RX ADMIN — OXYCODONE AND ACETAMINOPHEN 1 TABLET: 10; 325 TABLET ORAL at 20:24

## 2022-06-11 RX ADMIN — FLUTICASONE PROPIONATE 88 MCG: 44 AEROSOL, METERED RESPIRATORY (INHALATION) at 20:29

## 2022-06-11 RX ADMIN — OXYCODONE HYDROCHLORIDE AND ACETAMINOPHEN 500 MG: 500 TABLET ORAL at 05:37

## 2022-06-11 RX ADMIN — MORPHINE SULFATE 15 MG: 15 TABLET, FILM COATED, EXTENDED RELEASE ORAL at 08:02

## 2022-06-11 RX ADMIN — FLUTICASONE PROPIONATE 88 MCG: 44 AEROSOL, METERED RESPIRATORY (INHALATION) at 07:18

## 2022-06-11 RX ADMIN — ACETAMINOPHEN 650 MG: 325 TABLET ORAL at 05:49

## 2022-06-11 RX ADMIN — GABAPENTIN 800 MG: 400 CAPSULE ORAL at 08:50

## 2022-06-11 RX ADMIN — GABAPENTIN 800 MG: 400 CAPSULE ORAL at 12:37

## 2022-06-11 RX ADMIN — MULTIPLE VITAMINS W/ MINERALS TAB 1 TABLET: TAB at 05:37

## 2022-06-11 RX ADMIN — MORPHINE SULFATE 15 MG: 15 TABLET, FILM COATED, EXTENDED RELEASE ORAL at 20:24

## 2022-06-11 RX ADMIN — OXYCODONE AND ACETAMINOPHEN 1 TABLET: 10; 325 TABLET ORAL at 17:39

## 2022-06-11 RX ADMIN — DOCUSATE SODIUM 50 MG AND SENNOSIDES 8.6 MG 2 TABLET: 8.6; 5 TABLET, FILM COATED ORAL at 17:39

## 2022-06-11 RX ADMIN — Medication 400 MG: at 07:59

## 2022-06-11 RX ADMIN — OXYCODONE AND ACETAMINOPHEN 1 TABLET: 10; 325 TABLET ORAL at 08:50

## 2022-06-11 RX ADMIN — FUROSEMIDE 40 MG: 20 TABLET ORAL at 05:36

## 2022-06-11 RX ADMIN — GABAPENTIN 800 MG: 400 CAPSULE ORAL at 17:39

## 2022-06-11 RX ADMIN — GABAPENTIN 800 MG: 400 CAPSULE ORAL at 20:24

## 2022-06-11 RX ADMIN — Medication 400 MG: at 17:39

## 2022-06-11 RX ADMIN — OXYCODONE HYDROCHLORIDE 10 MG: 10 TABLET ORAL at 07:59

## 2022-06-11 RX ADMIN — FERROUS SULFATE TAB 325 MG (65 MG ELEMENTAL FE) 325 MG: 325 (65 FE) TAB at 08:02

## 2022-06-11 RX ADMIN — OXYCODONE AND ACETAMINOPHEN 1 TABLET: 10; 325 TABLET ORAL at 12:38

## 2022-06-11 RX ADMIN — ENOXAPARIN SODIUM 40 MG: 40 INJECTION SUBCUTANEOUS at 17:39

## 2022-06-11 ASSESSMENT — ENCOUNTER SYMPTOMS
DIZZINESS: 0
COUGH: 0
FOCAL WEAKNESS: 0
VOMITING: 0
BLURRED VISION: 0
CHILLS: 0
SORE THROAT: 0
HEARTBURN: 0
NERVOUS/ANXIOUS: 0
SENSORY CHANGE: 0
FEVER: 0
NECK PAIN: 0
WEAKNESS: 1
HEADACHES: 0
MYALGIAS: 0
FLANK PAIN: 0
BACK PAIN: 0
SHORTNESS OF BREATH: 0
PALPITATIONS: 0
WHEEZING: 0
ABDOMINAL PAIN: 0
DIARRHEA: 0
SPEECH CHANGE: 0
NAUSEA: 0
DIAPHORESIS: 0

## 2022-06-11 ASSESSMENT — PAIN DESCRIPTION - PAIN TYPE
TYPE: ACUTE PAIN
TYPE: SURGICAL PAIN
TYPE: ACUTE PAIN

## 2022-06-11 ASSESSMENT — FIBROSIS 4 INDEX: FIB4 SCORE: 0.82

## 2022-06-11 NOTE — PROGRESS NOTES
Report received from RN, assumed care at 1845  Pt is A0X4, and responds appropriately   Pt declines any SOB, chest pain, new onset of numbness/ tingiling  Pt rates pain at 8/10, on a scale of 1-10, pt medicated per MAR  Pt is voiding adequatly and without hesitancy, pure wick in place  Pt has + flatus, + bowel sounds, + BM on 6/10  Pt ambulates with a x1 assist and FWW  Refused ambulation with this RN this shift  Wound vac to RLE  Right face with sutures, edema.  Pt is tolerating a cardiac diet, pt denies any nausea/vomiting  Plan of care discussed, all questions answered. Explained importance of calling before getting OOB and pt verbalizes understanding. Call light is within reach, treaded slipper socks on, bed in lowest/ locked position, hourly rounding in place, all needs met at this time

## 2022-06-11 NOTE — PROGRESS NOTES
Hospital Medicine Daily Progress Note    Date of Service  6/11/2022    Chief Complaint  Karine Ovalle is a 57 y.o. female admitted 6/1/2022 with facial fractures.    Hospital Course  Admitted with facial fractures, facial laceration after a ground-level fall.  Facial laceration was repaired at the ED.  CT scan showed comminuted fracture of the nasal bridge involving bilateral medial orbital walls, there is a fracture of the lateral right orbital wall and ZMC, there is a fracture of the orbital floor, Fracture of the lateral wall of the right maxillary sinus which is depressed, Fracture of the right maxillary spine.  Oral maxillofacial surgery was consulted on the case, recommendation was conservative management.  Patient also with known history of chronic right lower extremity wound, and chronic lymphedema, it appeared to have probable cellulitis.  Patient was initially started on empiric coverage with IV vancomycin and Zosyn.  Cultures were noted to be negative.  Antibiotics were changed to ciprofloxacin and Bactrim.  Wound care was consulted on the case.    Interval Problem Update  Facial fractures - less facial swelling, pain controlled  Right leg wound - pain controlled  Low magnesium    I have personally seen and examined the patient at bedside. I discussed the plan of care with patient and bedside RN.    Consultants/Specialty  OMF surgery, LPS    Code Status  Full Code    Disposition  Patient is medically cleared for discharge.   Anticipate discharge to to a long-term acute care hospital.  I have placed the appropriate orders for post-discharge needs.    Review of Systems  Review of Systems   Constitutional: Negative for chills, diaphoresis, fever and malaise/fatigue.   HENT: Negative for congestion, hearing loss and sore throat.    Eyes: Negative for blurred vision.   Respiratory: Negative for cough, shortness of breath and wheezing.    Cardiovascular: Positive for leg swelling. Negative for chest pain  and palpitations.   Gastrointestinal: Negative for abdominal pain, diarrhea, heartburn, nausea and vomiting.   Genitourinary: Negative for dysuria, flank pain and hematuria.   Musculoskeletal: Negative for back pain, joint pain, myalgias and neck pain.   Skin: Negative for rash.   Neurological: Positive for weakness. Negative for dizziness, sensory change, speech change, focal weakness and headaches.   Psychiatric/Behavioral: The patient is not nervous/anxious.         Physical Exam  Temp:  [36.3 °C (97.3 °F)-36.6 °C (97.8 °F)] 36.4 °C (97.5 °F)  Pulse:  [] 53  Resp:  [17-18] 17  BP: (102-124)/(63-85) 122/85  SpO2:  [92 %-100 %] 100 %    Physical Exam  Vitals and nursing note reviewed.   Constitutional:       Appearance: She is obese.   HENT:      Head: Normocephalic.      Comments: Lacerations at right cheek with stitches     Nose: No congestion.      Mouth/Throat:      Mouth: Mucous membranes are moist.   Eyes:      Extraocular Movements: Extraocular movements intact.      Conjunctiva/sclera: Conjunctivae normal.   Cardiovascular:      Rate and Rhythm: Normal rate and regular rhythm.   Pulmonary:      Effort: Pulmonary effort is normal.      Breath sounds: Normal breath sounds.   Abdominal:      General: There is no distension.      Tenderness: There is no abdominal tenderness. There is no guarding or rebound.   Musculoskeletal:      Cervical back: No tenderness.      Right lower leg: Edema present.      Left lower leg: Edema present.      Comments: Wound VAC right leg   Skin:     General: Skin is warm and dry.   Neurological:      General: No focal deficit present.      Mental Status: She is alert and oriented to person, place, and time.      Cranial Nerves: No cranial nerve deficit.         Fluids    Intake/Output Summary (Last 24 hours) at 6/11/2022 1104  Last data filed at 6/11/2022 1000  Gross per 24 hour   Intake 520 ml   Output 2700 ml   Net -2180 ml       Laboratory  Recent Labs     06/09/22  0159  06/10/22  0032 06/11/22  0151   WBC 7.3 7.9 8.9   RBC 3.06* 3.22* 3.17*   HEMOGLOBIN 8.4* 8.9* 8.9*   HEMATOCRIT 24.8* 26.2* 26.1*   MCV 81.0* 81.4 82.3   MCH 27.5 27.6 28.1   MCHC 33.9 34.0 34.1   RDW 60.9* 62.1* 62.2*   PLATELETCT 404 406 419   MPV 9.1 9.4 9.2     Recent Labs     06/09/22  0155 06/10/22  0032 06/11/22  0151   SODIUM 134* 135 132*   POTASSIUM 4.1 3.5* 3.7   CHLORIDE 102 103 100   CO2 25 24 24   GLUCOSE 92 182* 141*   BUN 10 8 9   CREATININE 0.61 0.49* 0.58   CALCIUM 7.8* 8.3* 8.3*                   Imaging  EC-ECHOCARDIOGRAM COMPLETE W/O CONT   Final Result      US-EXTREMITY VENOUS LOWER BILAT   Final Result      CT-HEAD W/O   Final Result      1.  Multiple facial fractures as above.   2.  No acute intracranial hemorrhage or mass effect.         CT-MAXILLOFACIAL W/O PLUS RECONS   Final Result      1.  There is a comminuted fracture of the nasal bridge involving bilateral medial orbital walls.   2.  There is a fracture of the lateral right orbital wall and ZMC.   3.  There is a fracture of the orbital floor   4.  Fracture of the lateral wall of the right maxillary sinus which is depressed.   5.  Fracture of the right maxillary spine.   6.  There is thickening of the right lateral rectus muscle. No posterior orbital hematoma. There are a few foci of postorbital gas. There is periorbital swelling on the right.   7.  There is no fracture of the pterygoids. No mandibular fracture.   8.  There is odontogenic disease.      DX-TIBIA AND FIBULA RIGHT   Final Result      1.  Large soft tissue defect along the anterior, proximal shin with no radiographic findings to suggest osteomyelitis.   2.  Diffuse, decreased bone mineral density.           Assessment/Plan  * Multiple closed fractures of facial bone (HCC)- (present on admission)  Assessment & Plan  Pain control    Facial laceration- (present on admission)  Assessment & Plan  Wound care    Fall- (present on admission)  Assessment & Plan  PT and  OT      Open wound of right lower extremity- (present on admission)  Assessment & Plan  Wound care    Cellulitis of right lower extremity- (present on admission)  Assessment & Plan  finished course of Ciprofloxacin, Bactrim    Hypomagnesemia- (present on admission)  Assessment & Plan  Start oral Mg  Follow level    Serum gamma globulin increased- (present on admission)  Assessment & Plan  possibly secondary to cellulitis  Follow up as outpatient    Anemia, chronic disease- (present on admission)  Assessment & Plan  Follow cbc    Near syncope- (present on admission)  Assessment & Plan  PT and OT      Asthma- (present on admission)  Assessment & Plan  Flovent, RT protocol    Chronic pain syndrome- (present on admission)  Assessment & Plan  MS Contin, Percocet, Gabapentin    Hypokalemia  Assessment & Plan  Kdur, follow bmp    Lymphedema- (present on admission)  Assessment & Plan  Lasix    Hyponatremia- (present on admission)  Assessment & Plan  Follow bmp    Obesity (BMI 30-39.9)- (present on admission)  Assessment & Plan  Body mass index is 34.05 kg/m².    History of gastric bypass- (present on admission)  Assessment & Plan  Nutrition consult         VTE prophylaxis: enoxaparin ppx    I have performed a physical exam and reviewed and updated ROS and Plan today (6/11/2022). In review of yesterday's note (6/10/2022), there are no changes except as documented above.
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
## 2022-06-11 NOTE — CARE PLAN
The patient is Stable - Low risk of patient condition declining or worsening    Shift Goals  Clinical Goals: pain control, rest  Patient Goals: pain control  Family Goals: No family present at this time    Progress made toward(s) clinical / shift goals:    Problem: Pain - Standard  Goal: Alleviation of pain or a reduction in pain to the patient’s comfort goal  Outcome: Progressing     Problem: Knowledge Deficit - Standard  Goal: Patient and family/care givers will demonstrate understanding of plan of care, disease process/condition, diagnostic tests and medications  Outcome: Progressing       Patient is being medicated per MAR for pain. Patient is able to verbalize understanding of her plan of care.

## 2022-06-11 NOTE — CARE PLAN
The patient is Stable - Low risk of patient condition declining or worsening    Shift Goals  Clinical Goals: pain control, out of bed to chair  Patient Goals: pain control  Family Goals: No family present at this time    Progress made toward(s) clinical / shift goals:  patient resting comfortably, pain decreases with pain medication    Patient is not progressing towards the following goals:    Problem: Pain - Standard  Goal: Alleviation of pain or a reduction in pain to the patient’s comfort goal  Outcome: Progressing     Problem: Knowledge Deficit - Standard  Goal: Patient and family/care givers will demonstrate understanding of plan of care, disease process/condition, diagnostic tests and medications  Outcome: Progressing     Problem: Hemodynamics  Goal: Patient's hemodynamics, fluid balance and neurologic status will be stable or improve  Outcome: Progressing     Problem: Fluid Volume  Goal: Fluid volume balance will be maintained  Outcome: Progressing     Problem: Urinary - Renal Perfusion  Goal: Ability to achieve and maintain adequate renal perfusion and functioning will improve  Outcome: Progressing     Problem: Respiratory  Goal: Patient will achieve/maintain optimum respiratory ventilation and gas exchange  Outcome: Progressing     Problem: Mechanical Ventilation  Goal: Safe management of artificial airway and ventilation  Outcome: Progressing  Goal: Successful weaning off mechanical ventilator, spontaneously maintains adequate gas exchange  Outcome: Progressing  Goal: Patient will be able to express needs and understand communication  Outcome: Progressing     Problem: Physical Regulation  Goal: Diagnostic test results will improve  Outcome: Progressing  Goal: Signs and symptoms of infection will decrease  Outcome: Progressing     Problem: Skin Integrity  Goal: Skin integrity is maintained or improved  Outcome: Progressing     Problem: Fall Risk  Goal: Patient will remain free from falls  Outcome:  Progressing

## 2022-06-12 LAB
ANION GAP SERPL CALC-SCNC: 8 MMOL/L (ref 7–16)
BUN SERPL-MCNC: 8 MG/DL (ref 8–22)
CALCIUM SERPL-MCNC: 8.5 MG/DL (ref 8.5–10.5)
CHLORIDE SERPL-SCNC: 103 MMOL/L (ref 96–112)
CO2 SERPL-SCNC: 27 MMOL/L (ref 20–33)
CREAT SERPL-MCNC: 0.42 MG/DL (ref 0.5–1.4)
ERYTHROCYTE [DISTWIDTH] IN BLOOD BY AUTOMATED COUNT: 62 FL (ref 35.9–50)
GFR SERPLBLD CREATININE-BSD FMLA CKD-EPI: 114 ML/MIN/1.73 M 2
GLUCOSE SERPL-MCNC: 99 MG/DL (ref 65–99)
HCT VFR BLD AUTO: 27.8 % (ref 37–47)
HGB BLD-MCNC: 9.5 G/DL (ref 12–16)
MCH RBC QN AUTO: 28.1 PG (ref 27–33)
MCHC RBC AUTO-ENTMCNC: 34.2 G/DL (ref 33.6–35)
MCV RBC AUTO: 82.2 FL (ref 81.4–97.8)
PLATELET # BLD AUTO: 438 K/UL (ref 164–446)
PMV BLD AUTO: 9.2 FL (ref 9–12.9)
POTASSIUM SERPL-SCNC: 4.1 MMOL/L (ref 3.6–5.5)
RBC # BLD AUTO: 3.38 M/UL (ref 4.2–5.4)
SODIUM SERPL-SCNC: 138 MMOL/L (ref 135–145)
WBC # BLD AUTO: 7.7 K/UL (ref 4.8–10.8)

## 2022-06-12 PROCEDURE — A9270 NON-COVERED ITEM OR SERVICE: HCPCS | Performed by: INTERNAL MEDICINE

## 2022-06-12 PROCEDURE — 700102 HCHG RX REV CODE 250 W/ 637 OVERRIDE(OP): Performed by: STUDENT IN AN ORGANIZED HEALTH CARE EDUCATION/TRAINING PROGRAM

## 2022-06-12 PROCEDURE — 80048 BASIC METABOLIC PNL TOTAL CA: CPT

## 2022-06-12 PROCEDURE — A9270 NON-COVERED ITEM OR SERVICE: HCPCS | Performed by: NURSE PRACTITIONER

## 2022-06-12 PROCEDURE — A9270 NON-COVERED ITEM OR SERVICE: HCPCS | Performed by: STUDENT IN AN ORGANIZED HEALTH CARE EDUCATION/TRAINING PROGRAM

## 2022-06-12 PROCEDURE — 99231 SBSQ HOSP IP/OBS SF/LOW 25: CPT | Performed by: FAMILY MEDICINE

## 2022-06-12 PROCEDURE — 36415 COLL VENOUS BLD VENIPUNCTURE: CPT

## 2022-06-12 PROCEDURE — 85027 COMPLETE CBC AUTOMATED: CPT

## 2022-06-12 PROCEDURE — 700102 HCHG RX REV CODE 250 W/ 637 OVERRIDE(OP): Performed by: INTERNAL MEDICINE

## 2022-06-12 PROCEDURE — 700111 HCHG RX REV CODE 636 W/ 250 OVERRIDE (IP): Performed by: STUDENT IN AN ORGANIZED HEALTH CARE EDUCATION/TRAINING PROGRAM

## 2022-06-12 PROCEDURE — 770001 HCHG ROOM/CARE - MED/SURG/GYN PRIV*

## 2022-06-12 PROCEDURE — 700102 HCHG RX REV CODE 250 W/ 637 OVERRIDE(OP): Performed by: FAMILY MEDICINE

## 2022-06-12 PROCEDURE — 700102 HCHG RX REV CODE 250 W/ 637 OVERRIDE(OP): Performed by: NURSE PRACTITIONER

## 2022-06-12 PROCEDURE — A9270 NON-COVERED ITEM OR SERVICE: HCPCS | Performed by: FAMILY MEDICINE

## 2022-06-12 RX ORDER — SODIUM HYPOCHLORITE 1.25 MG/ML
SOLUTION TOPICAL DAILY
Status: DISCONTINUED | OUTPATIENT
Start: 2022-06-12 | End: 2022-06-14

## 2022-06-12 RX ADMIN — DOCUSATE SODIUM 50 MG AND SENNOSIDES 8.6 MG 2 TABLET: 8.6; 5 TABLET, FILM COATED ORAL at 17:48

## 2022-06-12 RX ADMIN — FLUTICASONE PROPIONATE 88 MCG: 44 AEROSOL, METERED RESPIRATORY (INHALATION) at 17:49

## 2022-06-12 RX ADMIN — OXYCODONE AND ACETAMINOPHEN 1 TABLET: 10; 325 TABLET ORAL at 20:55

## 2022-06-12 RX ADMIN — Medication 400 MG: at 17:49

## 2022-06-12 RX ADMIN — GABAPENTIN 800 MG: 400 CAPSULE ORAL at 12:34

## 2022-06-12 RX ADMIN — FUROSEMIDE 40 MG: 20 TABLET ORAL at 06:00

## 2022-06-12 RX ADMIN — POTASSIUM CHLORIDE 20 MEQ: 1500 TABLET, EXTENDED RELEASE ORAL at 04:39

## 2022-06-12 RX ADMIN — GABAPENTIN 800 MG: 400 CAPSULE ORAL at 17:49

## 2022-06-12 RX ADMIN — OXYCODONE AND ACETAMINOPHEN 1 TABLET: 10; 325 TABLET ORAL at 17:49

## 2022-06-12 RX ADMIN — Medication 400 MG: at 04:38

## 2022-06-12 RX ADMIN — DOCUSATE SODIUM 50 MG AND SENNOSIDES 8.6 MG 2 TABLET: 8.6; 5 TABLET, FILM COATED ORAL at 04:39

## 2022-06-12 RX ADMIN — OXYCODONE HYDROCHLORIDE AND ACETAMINOPHEN 500 MG: 500 TABLET ORAL at 04:38

## 2022-06-12 RX ADMIN — GABAPENTIN 800 MG: 400 CAPSULE ORAL at 08:34

## 2022-06-12 RX ADMIN — FLUTICASONE PROPIONATE 88 MCG: 44 AEROSOL, METERED RESPIRATORY (INHALATION) at 06:08

## 2022-06-12 RX ADMIN — OXYCODONE AND ACETAMINOPHEN 1 TABLET: 10; 325 TABLET ORAL at 08:34

## 2022-06-12 RX ADMIN — OXYCODONE AND ACETAMINOPHEN 1 TABLET: 10; 325 TABLET ORAL at 12:33

## 2022-06-12 RX ADMIN — FERROUS SULFATE TAB 325 MG (65 MG ELEMENTAL FE) 325 MG: 325 (65 FE) TAB at 08:34

## 2022-06-12 RX ADMIN — ENOXAPARIN SODIUM 40 MG: 40 INJECTION SUBCUTANEOUS at 17:48

## 2022-06-12 RX ADMIN — MORPHINE SULFATE 15 MG: 15 TABLET, FILM COATED, EXTENDED RELEASE ORAL at 08:34

## 2022-06-12 RX ADMIN — METHOCARBAMOL 1000 MG: 500 TABLET ORAL at 04:39

## 2022-06-12 RX ADMIN — MORPHINE SULFATE 15 MG: 15 TABLET, FILM COATED, EXTENDED RELEASE ORAL at 20:55

## 2022-06-12 RX ADMIN — MULTIPLE VITAMINS W/ MINERALS TAB 1 TABLET: TAB at 04:38

## 2022-06-12 RX ADMIN — GABAPENTIN 800 MG: 400 CAPSULE ORAL at 20:55

## 2022-06-12 ASSESSMENT — ENCOUNTER SYMPTOMS
DIZZINESS: 0
VOMITING: 0
NAUSEA: 0
ABDOMINAL PAIN: 0
SPEECH CHANGE: 0
FOCAL WEAKNESS: 0
MYALGIAS: 0
NERVOUS/ANXIOUS: 0
SENSORY CHANGE: 0
SORE THROAT: 0
SHORTNESS OF BREATH: 0
FEVER: 0
DIAPHORESIS: 0
CHILLS: 0
PALPITATIONS: 0
WEAKNESS: 1
COUGH: 0
BACK PAIN: 0
BLURRED VISION: 0
HEADACHES: 0
HEARTBURN: 0
WHEEZING: 0
DIARRHEA: 0
NECK PAIN: 0
FLANK PAIN: 0

## 2022-06-12 ASSESSMENT — PAIN DESCRIPTION - PAIN TYPE
TYPE: ACUTE PAIN
TYPE: ACUTE PAIN

## 2022-06-12 NOTE — CARE PLAN
The patient is Stable - Low risk of patient condition declining or worsening    Shift Goals  Clinical Goals: Pain management and rest  Patient Goals: Pain management  Family Goals: No family present at this time    Progress made toward(s) clinical / shift goals:    Problem: Knowledge Deficit - Standard  Goal: Patient and family/care givers will demonstrate understanding of plan of care, disease process/condition, diagnostic tests and medications  Outcome: Not Progressing  Note: Patient is self limiting. Patient is reluctant to mobilize with help.       Patient is not progressing towards the following goals:      Problem: Knowledge Deficit - Standard  Goal: Patient and family/care givers will demonstrate understanding of plan of care, disease process/condition, diagnostic tests and medications  Outcome: Not Progressing  Note: Patient is self limiting. Patient is reluctant to mobilize with help.

## 2022-06-12 NOTE — PROGRESS NOTES
Report received at bedside, pt care assumed Pt aaox4, no signs of distress noted at this time. Patient resting comfortably in bed. POC discussed with pt and verbalizes no questions. Pt c/o of 9/10 pain, pain medications administered. Pt denies any additional needs at this time. Bed in lowest position, pt educated on fall risk and verbalized understanding, call light within reach.

## 2022-06-12 NOTE — PROGRESS NOTES
Assumed care of pt at 0725. Report received and bedside rounding completed with night RN. Pt is calm no SOB, or in any acute distress noted.     Fall precautions in place,  bed alarm. - Treaded non slip socks. Bed locked. Communication board updated with POC. Call light and pt belongings within reach - hourly rounding in place.   Q2 turns implemented

## 2022-06-12 NOTE — CARE PLAN
The patient is Stable - Low risk of patient condition declining or worsening    Shift Goals  Clinical Goals: Pain management and rest  Patient Goals: Pain management  Family Goals: No family present at this time    Progress made toward(s) clinical / shift goals:  Respiratory and Pain control interventions in place     Patient is not progressing towards the following goals:      Problem: Respiratory  Goal: Patient will achieve/maintain optimum respiratory ventilation and gas exchange  Outcome: Progressing  Flowsheets (Taken 6/12/2022 0942)  O2 Delivery Device: None - Room Air  Incentive Spirometer: Effective  Deep Breathe and Cough: Performs Correctly  Note: Educated about using the IS, assessing lung sounds     Problem: Pain - Standard  Goal: Alleviation of pain or a reduction in pain to the patient’s comfort goal  Outcome: Progressing  Flowsheets (Taken 6/12/2022 0942)  OB Pain Intervention:   Medication - See MAR   Declines   Education  Note: Educated about the pain scale and non pharmacological pain methods, check the MAR

## 2022-06-12 NOTE — PROGRESS NOTES
Hospital Medicine Daily Progress Note    Date of Service  6/12/2022    Chief Complaint  Karine Ovalle is a 57 y.o. female admitted 6/1/2022 with facial fractures.    Hospital Course  Admitted with facial fractures, facial laceration after a ground-level fall.  Facial laceration was repaired at the ED.  CT scan showed comminuted fracture of the nasal bridge involving bilateral medial orbital walls, there is a fracture of the lateral right orbital wall and ZMC, there is a fracture of the orbital floor, Fracture of the lateral wall of the right maxillary sinus which is depressed, Fracture of the right maxillary spine.  Oral maxillofacial surgery was consulted on the case, recommendation was conservative management.  Patient also with known history of chronic right lower extremity wound, and chronic lymphedema, it appeared to have probable cellulitis.  Patient was initially started on empiric coverage with IV vancomycin and Zosyn.  Cultures were noted to be negative.  Antibiotics were changed to ciprofloxacin and Bactrim.  Wound care was consulted on the case.    Interval Problem Update  Facial fractures -  pain controlled  Right leg wound - pain controlled    I have personally seen and examined the patient at bedside. I discussed the plan of care with patient and bedside RN.    Consultants/Specialty  F surgery, LPS    Code Status  Full Code    Disposition  Patient is medically cleared for discharge.   Anticipate discharge to to a long-term acute care hospital.  I have placed the appropriate orders for post-discharge needs.    Review of Systems  Review of Systems   Constitutional: Negative for chills, diaphoresis, fever and malaise/fatigue.   HENT: Negative for congestion, hearing loss and sore throat.    Eyes: Negative for blurred vision.   Respiratory: Negative for cough, shortness of breath and wheezing.    Cardiovascular: Positive for leg swelling. Negative for chest pain and palpitations.    Gastrointestinal: Negative for abdominal pain, diarrhea, heartburn, nausea and vomiting.   Genitourinary: Negative for dysuria, flank pain and hematuria.   Musculoskeletal: Negative for back pain, joint pain, myalgias and neck pain.   Skin: Negative for rash.   Neurological: Positive for weakness. Negative for dizziness, sensory change, speech change, focal weakness and headaches.   Psychiatric/Behavioral: The patient is not nervous/anxious.         Physical Exam  Temp:  [36.2 °C (97.2 °F)-37.2 °C (99 °F)] 36.2 °C (97.2 °F)  Pulse:  [56-94] 90  Resp:  [16-17] 17  BP: ()/(49-68) 120/68  SpO2:  [93 %-100 %] 96 %    Physical Exam  Vitals and nursing note reviewed.   Constitutional:       Appearance: She is obese.   HENT:      Head: Normocephalic.      Comments: Lacerations at right cheek with stitches     Nose: No congestion.      Mouth/Throat:      Mouth: Mucous membranes are moist.   Eyes:      Extraocular Movements: Extraocular movements intact.      Conjunctiva/sclera: Conjunctivae normal.   Cardiovascular:      Rate and Rhythm: Normal rate and regular rhythm.   Pulmonary:      Effort: Pulmonary effort is normal.      Breath sounds: Normal breath sounds.   Abdominal:      General: There is no distension.      Tenderness: There is no abdominal tenderness. There is no guarding or rebound.   Musculoskeletal:      Cervical back: No tenderness.      Right lower leg: Edema present.      Left lower leg: Edema present.      Comments: Wound VAC right leg   Skin:     General: Skin is warm and dry.   Neurological:      General: No focal deficit present.      Mental Status: She is alert and oriented to person, place, and time.      Cranial Nerves: No cranial nerve deficit.         Fluids    Intake/Output Summary (Last 24 hours) at 6/12/2022 1123  Last data filed at 6/12/2022 0930  Gross per 24 hour   Intake 120 ml   Output 2200 ml   Net -2080 ml       Laboratory  Recent Labs     06/10/22  0032 06/11/22  0151  06/12/22  0645   WBC 7.9 8.9 7.7   RBC 3.22* 3.17* 3.38*   HEMOGLOBIN 8.9* 8.9* 9.5*   HEMATOCRIT 26.2* 26.1* 27.8*   MCV 81.4 82.3 82.2   MCH 27.6 28.1 28.1   MCHC 34.0 34.1 34.2   RDW 62.1* 62.2* 62.0*   PLATELETCT 406 419 438   MPV 9.4 9.2 9.2     Recent Labs     06/10/22  0032 06/11/22  0151 06/12/22  0645   SODIUM 135 132* 138   POTASSIUM 3.5* 3.7 4.1   CHLORIDE 103 100 103   CO2 24 24 27   GLUCOSE 182* 141* 99   BUN 8 9 8   CREATININE 0.49* 0.58 0.42*   CALCIUM 8.3* 8.3* 8.5                   Imaging  EC-ECHOCARDIOGRAM COMPLETE W/O CONT   Final Result      US-EXTREMITY VENOUS LOWER BILAT   Final Result      CT-HEAD W/O   Final Result      1.  Multiple facial fractures as above.   2.  No acute intracranial hemorrhage or mass effect.         CT-MAXILLOFACIAL W/O PLUS RECONS   Final Result      1.  There is a comminuted fracture of the nasal bridge involving bilateral medial orbital walls.   2.  There is a fracture of the lateral right orbital wall and ZMC.   3.  There is a fracture of the orbital floor   4.  Fracture of the lateral wall of the right maxillary sinus which is depressed.   5.  Fracture of the right maxillary spine.   6.  There is thickening of the right lateral rectus muscle. No posterior orbital hematoma. There are a few foci of postorbital gas. There is periorbital swelling on the right.   7.  There is no fracture of the pterygoids. No mandibular fracture.   8.  There is odontogenic disease.      DX-TIBIA AND FIBULA RIGHT   Final Result      1.  Large soft tissue defect along the anterior, proximal shin with no radiographic findings to suggest osteomyelitis.   2.  Diffuse, decreased bone mineral density.           Assessment/Plan  * Multiple closed fractures of facial bone (HCC)- (present on admission)  Assessment & Plan  Pain control    Facial laceration- (present on admission)  Assessment & Plan  Wound care    Fall- (present on admission)  Assessment & Plan  PT and OT      Open wound of right  lower extremity- (present on admission)  Assessment & Plan  Wound care    Cellulitis of right lower extremity- (present on admission)  Assessment & Plan  finished course of Ciprofloxacin, Bactrim    Hypomagnesemia- (present on admission)  Assessment & Plan  oral Mg  Follow level    Serum gamma globulin increased- (present on admission)  Assessment & Plan  possibly secondary to cellulitis  Follow up as outpatient    Anemia, chronic disease- (present on admission)  Assessment & Plan  Follow cbc    Near syncope- (present on admission)  Assessment & Plan  PT and OT      Asthma- (present on admission)  Assessment & Plan  Flovent, RT protocol    Chronic pain syndrome- (present on admission)  Assessment & Plan  MS Contin, Percocet, Gabapentin    Hypokalemia  Assessment & Plan  Kdur, follow bmp    Lymphedema- (present on admission)  Assessment & Plan  Lasix    Hyponatremia- (present on admission)  Assessment & Plan  Follow bmp    Obesity (BMI 30-39.9)- (present on admission)  Assessment & Plan  Body mass index is 34.05 kg/m².    History of gastric bypass- (present on admission)  Assessment & Plan  Nutrition consult         VTE prophylaxis: enoxaparin ppx    I have performed a physical exam and reviewed and updated ROS and Plan today (6/12/2022). In review of yesterday's note (6/11/2022), there are no changes except as documented above.

## 2022-06-13 LAB
ANION GAP SERPL CALC-SCNC: 9 MMOL/L (ref 7–16)
BUN SERPL-MCNC: 8 MG/DL (ref 8–22)
CALCIUM SERPL-MCNC: 8.3 MG/DL (ref 8.5–10.5)
CHLORIDE SERPL-SCNC: 102 MMOL/L (ref 96–112)
CO2 SERPL-SCNC: 24 MMOL/L (ref 20–33)
CREAT SERPL-MCNC: 0.47 MG/DL (ref 0.5–1.4)
ERYTHROCYTE [DISTWIDTH] IN BLOOD BY AUTOMATED COUNT: 63.2 FL (ref 35.9–50)
GFR SERPLBLD CREATININE-BSD FMLA CKD-EPI: 111 ML/MIN/1.73 M 2
GLUCOSE SERPL-MCNC: 110 MG/DL (ref 65–99)
HCT VFR BLD AUTO: 26.8 % (ref 37–47)
HGB BLD-MCNC: 9 G/DL (ref 12–16)
MAGNESIUM SERPL-MCNC: 1.7 MG/DL (ref 1.5–2.5)
MCH RBC QN AUTO: 28.2 PG (ref 27–33)
MCHC RBC AUTO-ENTMCNC: 33.6 G/DL (ref 33.6–35)
MCV RBC AUTO: 84 FL (ref 81.4–97.8)
PLATELET # BLD AUTO: 427 K/UL (ref 164–446)
PMV BLD AUTO: 9.1 FL (ref 9–12.9)
POTASSIUM SERPL-SCNC: 3.9 MMOL/L (ref 3.6–5.5)
RBC # BLD AUTO: 3.19 M/UL (ref 4.2–5.4)
SODIUM SERPL-SCNC: 135 MMOL/L (ref 135–145)
WBC # BLD AUTO: 8 K/UL (ref 4.8–10.8)

## 2022-06-13 PROCEDURE — 80048 BASIC METABOLIC PNL TOTAL CA: CPT

## 2022-06-13 PROCEDURE — 83735 ASSAY OF MAGNESIUM: CPT

## 2022-06-13 PROCEDURE — 36415 COLL VENOUS BLD VENIPUNCTURE: CPT

## 2022-06-13 PROCEDURE — 99231 SBSQ HOSP IP/OBS SF/LOW 25: CPT | Performed by: FAMILY MEDICINE

## 2022-06-13 PROCEDURE — A9270 NON-COVERED ITEM OR SERVICE: HCPCS | Performed by: FAMILY MEDICINE

## 2022-06-13 PROCEDURE — A9270 NON-COVERED ITEM OR SERVICE: HCPCS | Performed by: INTERNAL MEDICINE

## 2022-06-13 PROCEDURE — A9270 NON-COVERED ITEM OR SERVICE: HCPCS | Performed by: STUDENT IN AN ORGANIZED HEALTH CARE EDUCATION/TRAINING PROGRAM

## 2022-06-13 PROCEDURE — 700111 HCHG RX REV CODE 636 W/ 250 OVERRIDE (IP): Performed by: STUDENT IN AN ORGANIZED HEALTH CARE EDUCATION/TRAINING PROGRAM

## 2022-06-13 PROCEDURE — 770001 HCHG ROOM/CARE - MED/SURG/GYN PRIV*

## 2022-06-13 PROCEDURE — A9270 NON-COVERED ITEM OR SERVICE: HCPCS | Performed by: NURSE PRACTITIONER

## 2022-06-13 PROCEDURE — 700102 HCHG RX REV CODE 250 W/ 637 OVERRIDE(OP): Performed by: STUDENT IN AN ORGANIZED HEALTH CARE EDUCATION/TRAINING PROGRAM

## 2022-06-13 PROCEDURE — 700101 HCHG RX REV CODE 250: Performed by: FAMILY MEDICINE

## 2022-06-13 PROCEDURE — 700102 HCHG RX REV CODE 250 W/ 637 OVERRIDE(OP): Performed by: FAMILY MEDICINE

## 2022-06-13 PROCEDURE — 700102 HCHG RX REV CODE 250 W/ 637 OVERRIDE(OP): Performed by: NURSE PRACTITIONER

## 2022-06-13 PROCEDURE — 85027 COMPLETE CBC AUTOMATED: CPT

## 2022-06-13 PROCEDURE — 700102 HCHG RX REV CODE 250 W/ 637 OVERRIDE(OP): Performed by: INTERNAL MEDICINE

## 2022-06-13 RX ADMIN — MULTIPLE VITAMINS W/ MINERALS TAB 1 TABLET: TAB at 04:31

## 2022-06-13 RX ADMIN — OXYCODONE AND ACETAMINOPHEN 1 TABLET: 10; 325 TABLET ORAL at 08:33

## 2022-06-13 RX ADMIN — OXYCODONE HYDROCHLORIDE AND ACETAMINOPHEN 500 MG: 500 TABLET ORAL at 04:32

## 2022-06-13 RX ADMIN — FLUTICASONE PROPIONATE 88 MCG: 44 AEROSOL, METERED RESPIRATORY (INHALATION) at 06:24

## 2022-06-13 RX ADMIN — MORPHINE SULFATE 15 MG: 15 TABLET, FILM COATED, EXTENDED RELEASE ORAL at 22:29

## 2022-06-13 RX ADMIN — GABAPENTIN 800 MG: 400 CAPSULE ORAL at 22:28

## 2022-06-13 RX ADMIN — GABAPENTIN 800 MG: 400 CAPSULE ORAL at 13:02

## 2022-06-13 RX ADMIN — MORPHINE SULFATE 15 MG: 15 TABLET, FILM COATED, EXTENDED RELEASE ORAL at 08:33

## 2022-06-13 RX ADMIN — OXYCODONE AND ACETAMINOPHEN 1 TABLET: 10; 325 TABLET ORAL at 13:02

## 2022-06-13 RX ADMIN — FERROUS SULFATE TAB 325 MG (65 MG ELEMENTAL FE) 325 MG: 325 (65 FE) TAB at 08:33

## 2022-06-13 RX ADMIN — Medication 400 MG: at 17:33

## 2022-06-13 RX ADMIN — Medication 400 MG: at 04:32

## 2022-06-13 RX ADMIN — OXYCODONE HYDROCHLORIDE 10 MG: 10 TABLET ORAL at 02:25

## 2022-06-13 RX ADMIN — POTASSIUM CHLORIDE 20 MEQ: 1500 TABLET, EXTENDED RELEASE ORAL at 04:32

## 2022-06-13 RX ADMIN — OXYCODONE AND ACETAMINOPHEN 1 TABLET: 10; 325 TABLET ORAL at 17:33

## 2022-06-13 RX ADMIN — ENOXAPARIN SODIUM 40 MG: 40 INJECTION SUBCUTANEOUS at 17:32

## 2022-06-13 RX ADMIN — GABAPENTIN 800 MG: 400 CAPSULE ORAL at 08:33

## 2022-06-13 RX ADMIN — FLUTICASONE PROPIONATE 88 MCG: 44 AEROSOL, METERED RESPIRATORY (INHALATION) at 17:33

## 2022-06-13 RX ADMIN — DAKIN'S SOLUTION 0.125% (QUARTER STRENGTH) 473 ML: 0.12 SOLUTION at 17:40

## 2022-06-13 RX ADMIN — GABAPENTIN 800 MG: 400 CAPSULE ORAL at 17:33

## 2022-06-13 RX ADMIN — FUROSEMIDE 40 MG: 20 TABLET ORAL at 04:31

## 2022-06-13 RX ADMIN — OXYCODONE AND ACETAMINOPHEN 1 TABLET: 10; 325 TABLET ORAL at 22:29

## 2022-06-13 ASSESSMENT — ENCOUNTER SYMPTOMS
FOCAL WEAKNESS: 0
VOMITING: 0
CHILLS: 0
HEADACHES: 0
SPEECH CHANGE: 0
COUGH: 0
SHORTNESS OF BREATH: 0
NERVOUS/ANXIOUS: 0
FLANK PAIN: 0
ABDOMINAL PAIN: 0
DIARRHEA: 0
FEVER: 0
NAUSEA: 0
BLURRED VISION: 0
DIZZINESS: 0
NECK PAIN: 0
SENSORY CHANGE: 0
DIAPHORESIS: 0
BACK PAIN: 0
SORE THROAT: 0
WEAKNESS: 1
MYALGIAS: 0
WHEEZING: 0
PALPITATIONS: 0
HEARTBURN: 0

## 2022-06-13 ASSESSMENT — PAIN DESCRIPTION - PAIN TYPE
TYPE: ACUTE PAIN

## 2022-06-13 NOTE — THERAPY
Missed Therapy     Patient Name: Karine Ovalle  Age:  57 y.o., Sex:  female  Medical Record #: [id number]  Today's Date: 6/13/2022 06/13/22 1552   Interdisciplinary Plan of Care Collaboration   Collaboration Comments Attempted to initiate PT tx this date. Pt reported not sleeping last night, and being very tired. Pt reported doing exercises in bed, and showed previously prescribed exercises from home health agency. Attempted to encourage activity; however, pt declined stating she is too tired and would likely fall. Encouraged pt to mobilize to the chair with nursing for dinner later. Pt verbalized understanding.

## 2022-06-13 NOTE — PROGRESS NOTES
Bedside report received.  Assessment complete.  A&O x 4. Patient calls appropriately.  Patient ambulates with one assist.    Patient has 10/10 pain. Pain managed with prescribed medications.  Denies N&V. Tolerating diet.  Laceration to right face, sutured, CDI.  Wound vac to RLE, no leaks noted at this time, CDI.  + void, + flatus, + BM.  Patient denies SOB.  Patient pleasant with staff and resting in bed.  Review plan with of care with patient. Call light and personal belongings within reach. Hourly rounding in place. All needs met at this time.

## 2022-06-13 NOTE — CARE PLAN
Problem: Pain - Standard  Goal: Alleviation of pain or a reduction in pain to the patient’s comfort goal  Outcome: Progressing  Note: Patient c/o severe pain at rt foot with some relief from prn pain med.     Problem: Hemodynamics  Goal: Patient's hemodynamics, fluid balance and neurologic status will be stable or improve  Outcome: Progressing  Note: Patient alert and orientedx4, VSS with no signs of respiratory distress at room air, wound vac in placed intact at right foot, voiding without difficulty, uses purewick at night, moves well in bed, no new skin breakdown noted, neuro intact.   The patient is Stable - Low risk of patient condition declining or worsening    Shift Goals  Clinical Goals: comfort; safety  Patient Goals: rest  Family Goals: No family present at this time    Progress made toward(s) clinical / shift goals:      Patient is not progressing towards the following goals:

## 2022-06-13 NOTE — PROGRESS NOTES
Hospital Medicine Daily Progress Note    Date of Service  6/13/2022    Chief Complaint  Karine Ovalle is a 57 y.o. female admitted 6/1/2022 with facial fractures.    Hospital Course  Admitted with facial fractures, facial laceration after a ground-level fall.  Facial laceration was repaired at the ED.  CT scan showed comminuted fracture of the nasal bridge involving bilateral medial orbital walls, there is a fracture of the lateral right orbital wall and ZMC, there is a fracture of the orbital floor, Fracture of the lateral wall of the right maxillary sinus which is depressed, Fracture of the right maxillary spine.  Oral maxillofacial surgery was consulted on the case, recommendation was conservative management.  Patient also with known history of chronic right lower extremity wound, and chronic lymphedema, it appeared to have probable cellulitis.  Patient was initially started on empiric coverage with IV vancomycin and Zosyn.  Cultures were noted to be negative.  Antibiotics were changed to ciprofloxacin and Bactrim.  Wound care was consulted on the case.    Interval Problem Update  Facial fractures -  pain controlled  Right leg wound - pain controlled  Low magnesium    I have personally seen and examined the patient at bedside. I discussed the plan of care with patient, bedside RN, charge RN and .    Consultants/Specialty  [facility] surgery, LPS    Code Status  Full Code    Disposition  Patient is medically cleared for discharge.   Anticipate discharge to to a long-term acute care hospital.  I have placed the appropriate orders for post-discharge needs.    Review of Systems  Review of Systems   Constitutional: Negative for chills, diaphoresis, fever and malaise/fatigue.   HENT: Negative for congestion, hearing loss and sore throat.    Eyes: Negative for blurred vision.   Respiratory: Negative for cough, shortness of breath and wheezing.    Cardiovascular: Positive for leg swelling. Negative for chest  pain and palpitations.   Gastrointestinal: Negative for abdominal pain, diarrhea, heartburn, nausea and vomiting.   Genitourinary: Negative for dysuria, flank pain and hematuria.   Musculoskeletal: Negative for back pain, joint pain, myalgias and neck pain.   Skin: Negative for rash.   Neurological: Positive for weakness. Negative for dizziness, sensory change, speech change, focal weakness and headaches.   Psychiatric/Behavioral: The patient is not nervous/anxious.         Physical Exam  Temp:  [36.2 °C (97.2 °F)-36.6 °C (97.9 °F)] 36.6 °C (97.8 °F)  Pulse:  [75-98] 97  Resp:  [17-18] 18  BP: (103-122)/(41-69) 111/52  SpO2:  [95 %-98 %] 95 %    Physical Exam  Vitals and nursing note reviewed.   Constitutional:       Appearance: She is obese.   HENT:      Head: Normocephalic.      Comments: Lacerations at right cheek with stitches     Nose: No congestion.      Mouth/Throat:      Mouth: Mucous membranes are moist.   Eyes:      Extraocular Movements: Extraocular movements intact.      Conjunctiva/sclera: Conjunctivae normal.   Cardiovascular:      Rate and Rhythm: Normal rate and regular rhythm.   Pulmonary:      Effort: Pulmonary effort is normal.      Breath sounds: Normal breath sounds.   Abdominal:      General: There is no distension.      Tenderness: There is no abdominal tenderness. There is no guarding or rebound.   Musculoskeletal:      Cervical back: No tenderness.      Right lower leg: Edema present.      Left lower leg: Edema present.      Comments: Wound VAC right leg   Skin:     General: Skin is warm and dry.   Neurological:      General: No focal deficit present.      Mental Status: She is alert and oriented to person, place, and time.      Cranial Nerves: No cranial nerve deficit.         Fluids    Intake/Output Summary (Last 24 hours) at 6/13/2022 1503  Last data filed at 6/13/2022 1400  Gross per 24 hour   Intake 980 ml   Output 1450 ml   Net -470 ml       Laboratory  Recent Labs     06/11/22  0151  06/12/22  0645 06/13/22  1128   WBC 8.9 7.7 8.0   RBC 3.17* 3.38* 3.19*   HEMOGLOBIN 8.9* 9.5* 9.0*   HEMATOCRIT 26.1* 27.8* 26.8*   MCV 82.3 82.2 84.0   MCH 28.1 28.1 28.2   MCHC 34.1 34.2 33.6   RDW 62.2* 62.0* 63.2*   PLATELETCT 419 438 427   MPV 9.2 9.2 9.1     Recent Labs     06/11/22  0151 06/12/22  0645 06/13/22  1128   SODIUM 132* 138 135   POTASSIUM 3.7 4.1 3.9   CHLORIDE 100 103 102   CO2 24 27 24   GLUCOSE 141* 99 110*   BUN 9 8 8   CREATININE 0.58 0.42* 0.47*   CALCIUM 8.3* 8.5 8.3*                   Imaging  EC-ECHOCARDIOGRAM COMPLETE W/O CONT   Final Result      US-EXTREMITY VENOUS LOWER BILAT   Final Result      CT-HEAD W/O   Final Result      1.  Multiple facial fractures as above.   2.  No acute intracranial hemorrhage or mass effect.         CT-MAXILLOFACIAL W/O PLUS RECONS   Final Result      1.  There is a comminuted fracture of the nasal bridge involving bilateral medial orbital walls.   2.  There is a fracture of the lateral right orbital wall and ZMC.   3.  There is a fracture of the orbital floor   4.  Fracture of the lateral wall of the right maxillary sinus which is depressed.   5.  Fracture of the right maxillary spine.   6.  There is thickening of the right lateral rectus muscle. No posterior orbital hematoma. There are a few foci of postorbital gas. There is periorbital swelling on the right.   7.  There is no fracture of the pterygoids. No mandibular fracture.   8.  There is odontogenic disease.      DX-TIBIA AND FIBULA RIGHT   Final Result      1.  Large soft tissue defect along the anterior, proximal shin with no radiographic findings to suggest osteomyelitis.   2.  Diffuse, decreased bone mineral density.           Assessment/Plan  * Multiple closed fractures of facial bone (HCC)- (present on admission)  Assessment & Plan  Pain control    Facial laceration- (present on admission)  Assessment & Plan  Wound care    Fall- (present on admission)  Assessment & Plan  PT and OT      Open  wound of right lower extremity- (present on admission)  Assessment & Plan  Wound care    Cellulitis of right lower extremity- (present on admission)  Assessment & Plan  finished course of Ciprofloxacin, Bactrim    Hypomagnesemia- (present on admission)  Assessment & Plan  oral Mg  Follow level    Serum gamma globulin increased- (present on admission)  Assessment & Plan  possibly secondary to cellulitis  Follow up as outpatient    Anemia, chronic disease- (present on admission)  Assessment & Plan  Follow cbc    Near syncope- (present on admission)  Assessment & Plan  PT and OT      Asthma- (present on admission)  Assessment & Plan  Flovent, RT protocol    Chronic pain syndrome- (present on admission)  Assessment & Plan  MS Contin, Percocet, Gabapentin    Hypokalemia  Assessment & Plan  Kdur, follow bmp    Lymphedema- (present on admission)  Assessment & Plan  Lasix    Hyponatremia- (present on admission)  Assessment & Plan  Follow bmp    Obesity (BMI 30-39.9)- (present on admission)  Assessment & Plan  Body mass index is 34.05 kg/m².    History of gastric bypass- (present on admission)  Assessment & Plan  Nutrition consult         VTE prophylaxis: enoxaparin ppx    I have performed a physical exam and reviewed and updated ROS and Plan today (6/13/2022). In review of yesterday's note (6/12/2022), there are no changes except as documented above.

## 2022-06-13 NOTE — CARE PLAN
The patient is Stable - Low risk of patient condition declining or worsening    Shift Goals  Clinical Goals: Keep pain below 7/10, with frequent pain assesments, and medicating per MAR  Patient Goals: rest  Family Goals: No family present at this time    Progress made toward(s) clinical / shift goals:  Medicated per MAR

## 2022-06-13 NOTE — DISCHARGE PLANNING
Agency/Facility Name: [facility]  Outcome: Left a voicemail for Erin, asking for bed availability today and a callback.     @[phone]  Agency/Facility Name: [facility]   Spoke To: Erin   Outcome: Per Erin, they are still pending a bed. She will follow up after rounds for any updates.     @[phone]-  Agency/Facility Name: [facility]   Spoke To: Erin   Outcome: Per Erin they are still pending a bed and will follow up if any beds become available in the next couple of hours.

## 2022-06-14 LAB
ANION GAP SERPL CALC-SCNC: 7 MMOL/L (ref 7–16)
BUN SERPL-MCNC: 8 MG/DL (ref 8–22)
CALCIUM SERPL-MCNC: 8.4 MG/DL (ref 8.5–10.5)
CHLORIDE SERPL-SCNC: 102 MMOL/L (ref 96–112)
CO2 SERPL-SCNC: 27 MMOL/L (ref 20–33)
CREAT SERPL-MCNC: 0.46 MG/DL (ref 0.5–1.4)
ERYTHROCYTE [DISTWIDTH] IN BLOOD BY AUTOMATED COUNT: 62.1 FL (ref 35.9–50)
GFR SERPLBLD CREATININE-BSD FMLA CKD-EPI: 111 ML/MIN/1.73 M 2
GLUCOSE SERPL-MCNC: 93 MG/DL (ref 65–99)
HCT VFR BLD AUTO: 28.3 % (ref 37–47)
HGB BLD-MCNC: 9.5 G/DL (ref 12–16)
MCH RBC QN AUTO: 27.9 PG (ref 27–33)
MCHC RBC AUTO-ENTMCNC: 33.6 G/DL (ref 33.6–35)
MCV RBC AUTO: 83.2 FL (ref 81.4–97.8)
PLATELET # BLD AUTO: 449 K/UL (ref 164–446)
PMV BLD AUTO: 9.4 FL (ref 9–12.9)
POTASSIUM SERPL-SCNC: 4.3 MMOL/L (ref 3.6–5.5)
RBC # BLD AUTO: 3.4 M/UL (ref 4.2–5.4)
SODIUM SERPL-SCNC: 136 MMOL/L (ref 135–145)
WBC # BLD AUTO: 7.7 K/UL (ref 4.8–10.8)

## 2022-06-14 PROCEDURE — 700102 HCHG RX REV CODE 250 W/ 637 OVERRIDE(OP): Performed by: INTERNAL MEDICINE

## 2022-06-14 PROCEDURE — A9270 NON-COVERED ITEM OR SERVICE: HCPCS | Performed by: NURSE PRACTITIONER

## 2022-06-14 PROCEDURE — 97530 THERAPEUTIC ACTIVITIES: CPT

## 2022-06-14 PROCEDURE — 700101 HCHG RX REV CODE 250: Performed by: NURSE PRACTITIONER

## 2022-06-14 PROCEDURE — 700102 HCHG RX REV CODE 250 W/ 637 OVERRIDE(OP): Performed by: STUDENT IN AN ORGANIZED HEALTH CARE EDUCATION/TRAINING PROGRAM

## 2022-06-14 PROCEDURE — 700111 HCHG RX REV CODE 636 W/ 250 OVERRIDE (IP): Performed by: STUDENT IN AN ORGANIZED HEALTH CARE EDUCATION/TRAINING PROGRAM

## 2022-06-14 PROCEDURE — 700111 HCHG RX REV CODE 636 W/ 250 OVERRIDE (IP): Performed by: NURSE PRACTITIONER

## 2022-06-14 PROCEDURE — 97602 WOUND(S) CARE NON-SELECTIVE: CPT

## 2022-06-14 PROCEDURE — 99232 SBSQ HOSP IP/OBS MODERATE 35: CPT | Performed by: STUDENT IN AN ORGANIZED HEALTH CARE EDUCATION/TRAINING PROGRAM

## 2022-06-14 PROCEDURE — 770001 HCHG ROOM/CARE - MED/SURG/GYN PRIV*

## 2022-06-14 PROCEDURE — 85027 COMPLETE CBC AUTOMATED: CPT

## 2022-06-14 PROCEDURE — 700102 HCHG RX REV CODE 250 W/ 637 OVERRIDE(OP): Performed by: NURSE PRACTITIONER

## 2022-06-14 PROCEDURE — A9270 NON-COVERED ITEM OR SERVICE: HCPCS | Performed by: STUDENT IN AN ORGANIZED HEALTH CARE EDUCATION/TRAINING PROGRAM

## 2022-06-14 PROCEDURE — 97606 NEG PRS WND THER DME>50 SQCM: CPT

## 2022-06-14 PROCEDURE — 36415 COLL VENOUS BLD VENIPUNCTURE: CPT

## 2022-06-14 PROCEDURE — A9270 NON-COVERED ITEM OR SERVICE: HCPCS | Performed by: INTERNAL MEDICINE

## 2022-06-14 PROCEDURE — 80048 BASIC METABOLIC PNL TOTAL CA: CPT

## 2022-06-14 PROCEDURE — 97116 GAIT TRAINING THERAPY: CPT

## 2022-06-14 PROCEDURE — 700102 HCHG RX REV CODE 250 W/ 637 OVERRIDE(OP): Performed by: FAMILY MEDICINE

## 2022-06-14 PROCEDURE — A9270 NON-COVERED ITEM OR SERVICE: HCPCS | Performed by: FAMILY MEDICINE

## 2022-06-14 RX ORDER — SODIUM HYPOCHLORITE 1.25 MG/ML
SOLUTION TOPICAL
Status: DISCONTINUED | OUTPATIENT
Start: 2022-06-14 | End: 2022-06-18 | Stop reason: HOSPADM

## 2022-06-14 RX ADMIN — OXYCODONE AND ACETAMINOPHEN 1 TABLET: 10; 325 TABLET ORAL at 17:58

## 2022-06-14 RX ADMIN — DAKIN'S SOLUTION 0.125% (QUARTER STRENGTH) 473 ML: 0.12 SOLUTION at 06:11

## 2022-06-14 RX ADMIN — MORPHINE SULFATE 15 MG: 15 TABLET, FILM COATED, EXTENDED RELEASE ORAL at 09:33

## 2022-06-14 RX ADMIN — OXYCODONE HYDROCHLORIDE 10 MG: 10 TABLET ORAL at 06:22

## 2022-06-14 RX ADMIN — GABAPENTIN 800 MG: 400 CAPSULE ORAL at 09:33

## 2022-06-14 RX ADMIN — HYDROMORPHONE HYDROCHLORIDE 0.5 MG: 1 INJECTION, SOLUTION INTRAMUSCULAR; INTRAVENOUS; SUBCUTANEOUS at 11:32

## 2022-06-14 RX ADMIN — GABAPENTIN 800 MG: 400 CAPSULE ORAL at 17:58

## 2022-06-14 RX ADMIN — FLUTICASONE PROPIONATE 88 MCG: 44 AEROSOL, METERED RESPIRATORY (INHALATION) at 19:42

## 2022-06-14 RX ADMIN — FERROUS SULFATE TAB 325 MG (65 MG ELEMENTAL FE) 325 MG: 325 (65 FE) TAB at 09:32

## 2022-06-14 RX ADMIN — MORPHINE SULFATE 15 MG: 15 TABLET, FILM COATED, EXTENDED RELEASE ORAL at 21:07

## 2022-06-14 RX ADMIN — MULTIPLE VITAMINS W/ MINERALS TAB 1 TABLET: TAB at 06:11

## 2022-06-14 RX ADMIN — OXYCODONE AND ACETAMINOPHEN 1 TABLET: 10; 325 TABLET ORAL at 14:03

## 2022-06-14 RX ADMIN — Medication 400 MG: at 06:11

## 2022-06-14 RX ADMIN — LIDOCAINE HYDROCHLORIDE 1 APPLICATION: 40 SOLUTION TOPICAL at 12:00

## 2022-06-14 RX ADMIN — OXYCODONE AND ACETAMINOPHEN 1 TABLET: 10; 325 TABLET ORAL at 10:35

## 2022-06-14 RX ADMIN — ENOXAPARIN SODIUM 40 MG: 40 INJECTION SUBCUTANEOUS at 17:58

## 2022-06-14 RX ADMIN — GABAPENTIN 800 MG: 400 CAPSULE ORAL at 14:03

## 2022-06-14 RX ADMIN — GABAPENTIN 800 MG: 400 CAPSULE ORAL at 21:07

## 2022-06-14 RX ADMIN — OXYCODONE HYDROCHLORIDE AND ACETAMINOPHEN 500 MG: 500 TABLET ORAL at 06:11

## 2022-06-14 RX ADMIN — DOCUSATE SODIUM 50 MG AND SENNOSIDES 8.6 MG 2 TABLET: 8.6; 5 TABLET, FILM COATED ORAL at 17:58

## 2022-06-14 RX ADMIN — FLUTICASONE PROPIONATE 88 MCG: 44 AEROSOL, METERED RESPIRATORY (INHALATION) at 06:11

## 2022-06-14 RX ADMIN — OXYCODONE AND ACETAMINOPHEN 1 TABLET: 10; 325 TABLET ORAL at 21:07

## 2022-06-14 RX ADMIN — POTASSIUM CHLORIDE 20 MEQ: 1500 TABLET, EXTENDED RELEASE ORAL at 06:11

## 2022-06-14 RX ADMIN — Medication 400 MG: at 17:58

## 2022-06-14 RX ADMIN — FUROSEMIDE 40 MG: 20 TABLET ORAL at 06:11

## 2022-06-14 ASSESSMENT — ENCOUNTER SYMPTOMS
CHILLS: 0
VOMITING: 0
SPEECH CHANGE: 0
SORE THROAT: 0
FEVER: 0
COUGH: 0
NERVOUS/ANXIOUS: 0
SENSORY CHANGE: 0
DIAPHORESIS: 0
FLANK PAIN: 0
WEAKNESS: 1
SHORTNESS OF BREATH: 0
NECK PAIN: 0
HEARTBURN: 0
DIARRHEA: 0
FOCAL WEAKNESS: 0
NAUSEA: 0
HEADACHES: 0
BACK PAIN: 0
PALPITATIONS: 0
MYALGIAS: 0
WHEEZING: 0
DIZZINESS: 0
BLURRED VISION: 0
ABDOMINAL PAIN: 0

## 2022-06-14 ASSESSMENT — GAIT ASSESSMENTS
GAIT LEVEL OF ASSIST: CONTACT GUARD ASSIST
DEVIATION: STEP TO;BRADYKINETIC;DECREASED TOE OFF;DECREASED HEEL STRIKE
DISTANCE (FEET): 20
ASSISTIVE DEVICE: FRONT WHEEL WALKER

## 2022-06-14 ASSESSMENT — COGNITIVE AND FUNCTIONAL STATUS - GENERAL
TURNING FROM BACK TO SIDE WHILE IN FLAT BAD: A LITTLE
MOBILITY SCORE: 17
WALKING IN HOSPITAL ROOM: A LITTLE
CLIMB 3 TO 5 STEPS WITH RAILING: A LOT
STANDING UP FROM CHAIR USING ARMS: A LITTLE
MOVING FROM LYING ON BACK TO SITTING ON SIDE OF FLAT BED: A LITTLE
SUGGESTED CMS G CODE MODIFIER MOBILITY: CK
MOVING TO AND FROM BED TO CHAIR: A LITTLE

## 2022-06-14 ASSESSMENT — PAIN DESCRIPTION - PAIN TYPE
TYPE: ACUTE PAIN
TYPE: ACUTE PAIN;CHRONIC PAIN
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
## 2022-06-14 NOTE — THERAPY
"Physical Therapy   Daily Treatment     Patient Name: Karine Ovalle  Age:  57 y.o., Sex:  female  Medical Record #: [id number]  Today's Date: 6/14/2022     Precautions  Precautions: Fall Risk  Comments: R LE wound vac    Assessment    Pt seen for PT treatment session. Pt requires extra time to complete tasks and can be self limiting at times. Overall able to complete mobility at CGA- SBA level. Needs encouragement to do more OOB- use BSC, ambulate to toilet, walk as tolerated. PT will continue to follow    Plan    Continue current treatment plan.    DC Equipment Recommendations: Unable to determine at this time  Discharge Recommendations: Recommend post-acute placement for additional physical therapy services prior to discharge home     06/14/22 1618   Precautions   Precautions Fall Risk   Comments R LE wound vac   Vitals   O2 Delivery Device None - Room Air   Pain 0 - 10 Group   Therapist Pain Assessment Nurse Notified  (R LE pain)   Cognition    Level of Consciousness Alert   Comments agreeable to PT, requires extra time and can be self limiting at times   Balance   Sitting Balance (Static) Good   Sitting Balance (Dynamic) Fair +   Standing Balance (Static) Fair   Standing Balance (Dynamic) Fair   Weight Shift Sitting Good   Weight Shift Standing Fair   Skilled Intervention Compensatory Strategies;Tactile Cuing;Verbal Cuing   Comments w/ FWW   Gait Analysis   Gait Level Of Assist Contact Guard Assist   Assistive Device Front Wheel Walker   Distance (Feet) 20   # of Times Distance was Traveled 1   Deviation Step To;Bradykinetic;Decreased Toe Off;Decreased Heel Strike   Weight Bearing Status no restrictions   Skilled Intervention Verbal Cuing   Comments distance limited by pain and feeling the R ankle \"needs to pop\"   Bed Mobility    Supine to Sit Standby Assist   Sit to Supine Minimal Assist  (to B LE)   Scooting Supervised   Skilled Intervention Verbal Cuing   Comments with extra time   Functional Mobility   Sit " to Stand Standby Assist   Bed, Chair, Wheelchair Transfer Standby Assist   Transfer Method Stand Step   Skilled Intervention Verbal Cuing;Sequencing   How much difficulty does the patient currently have...   Turning over in bed (including adjusting bedclothes, sheets and blankets)? 3   Sitting down on and standing up from a chair with arms (e.g., wheelchair, bedside commode, etc.) 3   Moving from lying on back to sitting on the side of the bed? 3   How much help from another person does the patient currently need...   Moving to and from a bed to a chair (including a wheelchair)? 3   Need to walk in a hospital room? 3   Climbing 3-5 steps with a railing? 2   6 clicks Mobility Score 17   Short Term Goals    Short Term Goal # 1 Pt will demo supine<>sit eob w/ hob flat and no rails spv in 6 visits for independence w/ bed mobility   Goal Outcome # 1 Progressing as expected   Short Term Goal # 2 Pt will demo stand step txr eob<>chair w/ fww spv in 6 visits for independence w/ OOB mobility   Goal Outcome # 2 Progressing as expected   Short Term Goal # 3 Pt will demo gait >150' using fww spv in a moving environment in 6 visits for household ambulation.   Goal Outcome # 3 Goal not met   Anticipated Discharge Equipment and Recommendations   Discharge Recommendations Recommend post-acute placement for additional physical therapy services prior to discharge home

## 2022-06-14 NOTE — CARE PLAN
The patient is Stable - Low risk of patient condition declining or worsening    Shift Goals  Clinical Goals: Pain control, monitor WV output, rest  Patient Goals: Pain control, IV maintenance for pain meds prior to WV change in AM.  Family Goals: No family present at this time    Progress made toward(s) clinical / shift goals: Medicated for pain; see MAR. [place] changed on WV with veraflo; brown, watery drainage noted. Patient fell asleep after 0100 until end of shift.     Patient is not progressing towards the following goals: NA.

## 2022-06-14 NOTE — THERAPY
06/13/22 1605   Interdisciplinary Plan of Care Collaboration   Collaboration Comments Attempted OT tx. Pt asleep on room entry. Pt politely declined citing extreme fatigue. Will attempt again.

## 2022-06-14 NOTE — DISCHARGE PLANNING
Agency/Facility Name: [facility]  Outcome: Left a voicemail for liaedison Khan asking for a bed today.     [phone]  Agency/Facility Name: [facility]   Spoke To: Erin   Outcome: Per erin they may have beds opening up today and she will follow up shortly.

## 2022-06-14 NOTE — PROGRESS NOTES
Report received from AM RN; assumed care. Assessment complete. A&O x 4. VSS. 95-99% on RA. Patient denying SOB, nausea, vomiting, dizziness. Numbness/tingling reported to right hand after cervical surgery along and to RLE with WV. Cannister changed, formerly brown/watery drainage noted. Medicated for pain; see MAR.  Abdomen round. Hyperactive BS x 4 noted. + void; yellow urine noted in purewick cannister. + eructation. + flatus. LBM 06/13, large/brown/formed. Patient tolerating diet, snacking frequently. Patient up x 1 per report. Patient refused d/t pain, turning on/off bedpan. Discussed plan of care with patient. All questions answered. Moderate fall risk. Bed/strip alarm engaged. Bed in locked/lowest position.  Call light/personal belongings within reach.  All needs met, patient slept intermittently during shift.

## 2022-06-14 NOTE — WOUND TEAM
Assisted VERO Hedrick with wound care, non-selective debridement performed using wound cleanser/NS and gauze. Please see Ryley's wound note for further wound care details.

## 2022-06-14 NOTE — PROGRESS NOTES
Hospital Medicine Daily Progress Note    Date of Service  6/14/2022    Chief Complaint  Karine Ovalle is a 57 y.o. female admitted 6/1/2022 with facial fractures.    Hospital Course  Admitted with facial fractures, facial laceration after a ground-level fall.  Facial laceration was repaired at the ED.  CT scan showed comminuted fracture of the nasal bridge involving bilateral medial orbital walls, there is a fracture of the lateral right orbital wall and ZMC, there is a fracture of the orbital floor, Fracture of the lateral wall of the right maxillary sinus which is depressed, Fracture of the right maxillary spine.  Oral maxillofacial surgery was consulted on the case, recommendation was conservative management.  Patient also with known history of chronic right lower extremity wound, and chronic lymphedema, it appeared to have probable cellulitis.  Patient was initially started on empiric coverage with IV vancomycin and Zosyn.  Cultures were noted to be negative.  Antibiotics were changed to ciprofloxacin and Bactrim.  Wound care was consulted on the case.    Interval Problem Update  Facial fractures -  pain controlled  Right leg wound - pain controlled    At bedside, Pt is concerned about the smell from her wound; she wondered the need for Abx. We discussed about current clinical signs and symptoms not suggestive of recurrent infection but will discuss with her wound care upon dressing change today.     Otherwise, she has been medically stable to be DC to LTACH - pending bed.     Labs and imagins reviewed.     I have personally seen and examined the patient at bedside. I discussed the plan of care with patient, bedside RN, charge RN and .    Consultants/Specialty  OMF surgery, LPS    Code Status  Full Code    Disposition  Patient is medically cleared for discharge.   Anticipate discharge to to a long-term acute care hospital.  I have placed the appropriate orders for post-discharge  needs.    Review of Systems  Review of Systems   Constitutional: Negative for chills, diaphoresis, fever and malaise/fatigue.   HENT: Negative for congestion, hearing loss and sore throat.    Eyes: Negative for blurred vision.   Respiratory: Negative for cough, shortness of breath and wheezing.    Cardiovascular: Positive for leg swelling. Negative for chest pain and palpitations.   Gastrointestinal: Negative for abdominal pain, diarrhea, heartburn, nausea and vomiting.   Genitourinary: Negative for dysuria, flank pain and hematuria.   Musculoskeletal: Negative for back pain, joint pain, myalgias and neck pain.   Skin: Negative for rash.   Neurological: Positive for weakness. Negative for dizziness, sensory change, speech change, focal weakness and headaches.   Psychiatric/Behavioral: The patient is not nervous/anxious.         Physical Exam  Temp:  [36.1 °C (97 °F)-36.9 °C (98.5 °F)] 36.4 °C (97.5 °F)  Pulse:  [74-95] 93  Resp:  [17-20] 18  BP: (100-114)/(52-63) 104/59  SpO2:  [92 %-100 %] 92 %    Physical Exam  Vitals and nursing note reviewed.   Constitutional:       Appearance: She is obese.   HENT:      Head: Normocephalic.      Comments: Lacerations at right cheek with stitches     Nose: No congestion.      Mouth/Throat:      Mouth: Mucous membranes are moist.   Eyes:      Extraocular Movements: Extraocular movements intact.      Conjunctiva/sclera: Conjunctivae normal.   Cardiovascular:      Rate and Rhythm: Normal rate and regular rhythm.   Pulmonary:      Effort: Pulmonary effort is normal.      Breath sounds: Normal breath sounds.   Abdominal:      General: There is no distension.      Tenderness: There is no abdominal tenderness. There is no guarding or rebound.   Musculoskeletal:      Cervical back: No tenderness.      Right lower leg: Edema present.      Left lower leg: Edema present.      Comments: Wound VAC right leg   Skin:     General: Skin is warm and dry.   Neurological:      General: No focal  deficit present.      Mental Status: She is alert and oriented to person, place, and time.      Cranial Nerves: No cranial nerve deficit.         Fluids    Intake/Output Summary (Last 24 hours) at 6/14/2022 1637  Last data filed at 6/14/2022 1100  Gross per 24 hour   Intake --   Output 2550 ml   Net -2550 ml       Laboratory  Recent Labs     06/12/22  0645 06/13/22  1128 06/14/22  0154   WBC 7.7 8.0 7.7   RBC 3.38* 3.19* 3.40*   HEMOGLOBIN 9.5* 9.0* 9.5*   HEMATOCRIT 27.8* 26.8* 28.3*   MCV 82.2 84.0 83.2   MCH 28.1 28.2 27.9   MCHC 34.2 33.6 33.6   RDW 62.0* 63.2* 62.1*   PLATELETCT 438 427 449*   MPV 9.2 9.1 9.4     Recent Labs     06/12/22  0645 06/13/22  1128 06/14/22  0154   SODIUM 138 135 136   POTASSIUM 4.1 3.9 4.3   CHLORIDE 103 102 102   CO2 27 24 27   GLUCOSE 99 110* 93   BUN 8 8 8   CREATININE 0.42* 0.47* 0.46*   CALCIUM 8.5 8.3* 8.4*                   Imaging  EC-ECHOCARDIOGRAM COMPLETE W/O CONT   Final Result      US-EXTREMITY VENOUS LOWER BILAT   Final Result      CT-HEAD W/O   Final Result      1.  Multiple facial fractures as above.   2.  No acute intracranial hemorrhage or mass effect.         CT-MAXILLOFACIAL W/O PLUS RECONS   Final Result      1.  There is a comminuted fracture of the nasal bridge involving bilateral medial orbital walls.   2.  There is a fracture of the lateral right orbital wall and ZMC.   3.  There is a fracture of the orbital floor   4.  Fracture of the lateral wall of the right maxillary sinus which is depressed.   5.  Fracture of the right maxillary spine.   6.  There is thickening of the right lateral rectus muscle. No posterior orbital hematoma. There are a few foci of postorbital gas. There is periorbital swelling on the right.   7.  There is no fracture of the pterygoids. No mandibular fracture.   8.  There is odontogenic disease.      DX-TIBIA AND FIBULA RIGHT   Final Result      1.  Large soft tissue defect along the anterior, proximal shin with no radiographic  findings to suggest osteomyelitis.   2.  Diffuse, decreased bone mineral density.           Assessment/Plan  * Multiple closed fractures of facial bone (HCC)- (present on admission)  Assessment & Plan  Pain control    Hypomagnesemia- (present on admission)  Assessment & Plan  oral Mg  Follow level    Serum gamma globulin increased- (present on admission)  Assessment & Plan  possibly secondary to cellulitis  Follow up as outpatient    Facial laceration- (present on admission)  Assessment & Plan  Wound care    Fall- (present on admission)  Assessment & Plan  PT and OT      Anemia, chronic disease- (present on admission)  Assessment & Plan  Follow cbc    Near syncope- (present on admission)  Assessment & Plan  PT and OT      Asthma- (present on admission)  Assessment & Plan  Flovent, RT protocol    Chronic pain syndrome- (present on admission)  Assessment & Plan  MS Contin, Percocet, Gabapentin    Open wound of right lower extremity- (present on admission)  Assessment & Plan  Wound care    Cellulitis of right lower extremity- (present on admission)  Assessment & Plan  finished course of Ciprofloxacin, Bactrim    Hypokalemia  Assessment & Plan  Kdur, follow bmp    Lymphedema- (present on admission)  Assessment & Plan  Lasix    Obesity (BMI 30-39.9)- (present on admission)  Assessment & Plan  Body mass index is 34.05 kg/m².    Hyponatremia- (present on admission)  Assessment & Plan  Follow bmp    History of gastric bypass- (present on admission)  Assessment & Plan  Nutrition consult         VTE prophylaxis: enoxaparin ppx    I have performed a physical exam and reviewed and updated ROS and Plan today (6/14/2022).

## 2022-06-15 LAB
ANION GAP SERPL CALC-SCNC: 8 MMOL/L (ref 7–16)
BUN SERPL-MCNC: 9 MG/DL (ref 8–22)
CALCIUM SERPL-MCNC: 8.6 MG/DL (ref 8.5–10.5)
CHLORIDE SERPL-SCNC: 102 MMOL/L (ref 96–112)
CO2 SERPL-SCNC: 26 MMOL/L (ref 20–33)
CREAT SERPL-MCNC: 0.5 MG/DL (ref 0.5–1.4)
ERYTHROCYTE [DISTWIDTH] IN BLOOD BY AUTOMATED COUNT: 62.3 FL (ref 35.9–50)
GFR SERPLBLD CREATININE-BSD FMLA CKD-EPI: 109 ML/MIN/1.73 M 2
GLUCOSE SERPL-MCNC: 108 MG/DL (ref 65–99)
HCT VFR BLD AUTO: 27.9 % (ref 37–47)
HGB BLD-MCNC: 9.4 G/DL (ref 12–16)
MCH RBC QN AUTO: 28.5 PG (ref 27–33)
MCHC RBC AUTO-ENTMCNC: 33.7 G/DL (ref 33.6–35)
MCV RBC AUTO: 84.5 FL (ref 81.4–97.8)
PLATELET # BLD AUTO: 467 K/UL (ref 164–446)
PMV BLD AUTO: 9.2 FL (ref 9–12.9)
POTASSIUM SERPL-SCNC: 4 MMOL/L (ref 3.6–5.5)
RBC # BLD AUTO: 3.3 M/UL (ref 4.2–5.4)
SODIUM SERPL-SCNC: 136 MMOL/L (ref 135–145)
WBC # BLD AUTO: 8 K/UL (ref 4.8–10.8)

## 2022-06-15 PROCEDURE — 80048 BASIC METABOLIC PNL TOTAL CA: CPT

## 2022-06-15 PROCEDURE — 700102 HCHG RX REV CODE 250 W/ 637 OVERRIDE(OP): Performed by: FAMILY MEDICINE

## 2022-06-15 PROCEDURE — 700102 HCHG RX REV CODE 250 W/ 637 OVERRIDE(OP): Performed by: NURSE PRACTITIONER

## 2022-06-15 PROCEDURE — A9270 NON-COVERED ITEM OR SERVICE: HCPCS | Performed by: FAMILY MEDICINE

## 2022-06-15 PROCEDURE — A9270 NON-COVERED ITEM OR SERVICE: HCPCS | Performed by: NURSE PRACTITIONER

## 2022-06-15 PROCEDURE — 700111 HCHG RX REV CODE 636 W/ 250 OVERRIDE (IP): Performed by: STUDENT IN AN ORGANIZED HEALTH CARE EDUCATION/TRAINING PROGRAM

## 2022-06-15 PROCEDURE — A9270 NON-COVERED ITEM OR SERVICE: HCPCS | Performed by: STUDENT IN AN ORGANIZED HEALTH CARE EDUCATION/TRAINING PROGRAM

## 2022-06-15 PROCEDURE — 700102 HCHG RX REV CODE 250 W/ 637 OVERRIDE(OP): Performed by: INTERNAL MEDICINE

## 2022-06-15 PROCEDURE — 85027 COMPLETE CBC AUTOMATED: CPT

## 2022-06-15 PROCEDURE — 99232 SBSQ HOSP IP/OBS MODERATE 35: CPT | Performed by: STUDENT IN AN ORGANIZED HEALTH CARE EDUCATION/TRAINING PROGRAM

## 2022-06-15 PROCEDURE — A9270 NON-COVERED ITEM OR SERVICE: HCPCS | Performed by: INTERNAL MEDICINE

## 2022-06-15 PROCEDURE — 770001 HCHG ROOM/CARE - MED/SURG/GYN PRIV*

## 2022-06-15 PROCEDURE — 700102 HCHG RX REV CODE 250 W/ 637 OVERRIDE(OP): Performed by: STUDENT IN AN ORGANIZED HEALTH CARE EDUCATION/TRAINING PROGRAM

## 2022-06-15 PROCEDURE — 36415 COLL VENOUS BLD VENIPUNCTURE: CPT

## 2022-06-15 RX ADMIN — GABAPENTIN 800 MG: 400 CAPSULE ORAL at 17:43

## 2022-06-15 RX ADMIN — FUROSEMIDE 40 MG: 20 TABLET ORAL at 05:46

## 2022-06-15 RX ADMIN — GABAPENTIN 800 MG: 400 CAPSULE ORAL at 13:21

## 2022-06-15 RX ADMIN — POTASSIUM CHLORIDE 20 MEQ: 1500 TABLET, EXTENDED RELEASE ORAL at 04:49

## 2022-06-15 RX ADMIN — OXYCODONE AND ACETAMINOPHEN 1 TABLET: 10; 325 TABLET ORAL at 08:44

## 2022-06-15 RX ADMIN — GABAPENTIN 800 MG: 400 CAPSULE ORAL at 20:40

## 2022-06-15 RX ADMIN — FLUTICASONE PROPIONATE 88 MCG: 44 AEROSOL, METERED RESPIRATORY (INHALATION) at 17:45

## 2022-06-15 RX ADMIN — MORPHINE SULFATE 15 MG: 15 TABLET, FILM COATED, EXTENDED RELEASE ORAL at 08:44

## 2022-06-15 RX ADMIN — OXYCODONE AND ACETAMINOPHEN 1 TABLET: 10; 325 TABLET ORAL at 20:40

## 2022-06-15 RX ADMIN — MULTIPLE VITAMINS W/ MINERALS TAB 1 TABLET: TAB at 04:49

## 2022-06-15 RX ADMIN — FLUTICASONE PROPIONATE 88 MCG: 44 AEROSOL, METERED RESPIRATORY (INHALATION) at 05:47

## 2022-06-15 RX ADMIN — Medication 400 MG: at 17:43

## 2022-06-15 RX ADMIN — MORPHINE SULFATE 15 MG: 15 TABLET, FILM COATED, EXTENDED RELEASE ORAL at 20:39

## 2022-06-15 RX ADMIN — OXYCODONE HYDROCHLORIDE AND ACETAMINOPHEN 500 MG: 500 TABLET ORAL at 04:49

## 2022-06-15 RX ADMIN — FERROUS SULFATE TAB 325 MG (65 MG ELEMENTAL FE) 325 MG: 325 (65 FE) TAB at 08:44

## 2022-06-15 RX ADMIN — DOCUSATE SODIUM 50 MG AND SENNOSIDES 8.6 MG 2 TABLET: 8.6; 5 TABLET, FILM COATED ORAL at 17:43

## 2022-06-15 RX ADMIN — ENOXAPARIN SODIUM 40 MG: 40 INJECTION SUBCUTANEOUS at 17:44

## 2022-06-15 RX ADMIN — DOCUSATE SODIUM 50 MG AND SENNOSIDES 8.6 MG 2 TABLET: 8.6; 5 TABLET, FILM COATED ORAL at 04:55

## 2022-06-15 RX ADMIN — Medication 400 MG: at 04:49

## 2022-06-15 RX ADMIN — GABAPENTIN 800 MG: 400 CAPSULE ORAL at 08:44

## 2022-06-15 RX ADMIN — OXYCODONE HYDROCHLORIDE 10 MG: 10 TABLET ORAL at 04:54

## 2022-06-15 RX ADMIN — OXYCODONE AND ACETAMINOPHEN 1 TABLET: 10; 325 TABLET ORAL at 13:21

## 2022-06-15 RX ADMIN — OXYCODONE AND ACETAMINOPHEN 1 TABLET: 10; 325 TABLET ORAL at 17:43

## 2022-06-15 ASSESSMENT — ENCOUNTER SYMPTOMS
HEARTBURN: 0
FEVER: 0
NAUSEA: 0
MYALGIAS: 0
VOMITING: 0
SHORTNESS OF BREATH: 0
SPEECH CHANGE: 0
ABDOMINAL PAIN: 0
SORE THROAT: 0
PALPITATIONS: 0
FLANK PAIN: 0
HEADACHES: 0
COUGH: 0
DIAPHORESIS: 0
WEAKNESS: 1
FOCAL WEAKNESS: 0
SENSORY CHANGE: 0
WHEEZING: 0
BLURRED VISION: 0
DIARRHEA: 0
DIZZINESS: 0
BACK PAIN: 0
CHILLS: 0
NERVOUS/ANXIOUS: 0
NECK PAIN: 0

## 2022-06-15 ASSESSMENT — PAIN DESCRIPTION - PAIN TYPE: TYPE: ACUTE PAIN

## 2022-06-15 NOTE — DISCHARGE PLANNING
Agency/Facility Name: [facility]   Spoke To: Erin   Outcome: Per Erin they are waiting for the bed availability this morning. She will call if she has a bed available.     @[id number]-  Agency/Facility Name: [facility]   Spoke To: Erin   Outcome: Per Erin they do not anticipate any open beds today.

## 2022-06-15 NOTE — PROGRESS NOTES
Report received from RN, assumed care at 0700  Pt is A0X4, and responds appropriately   Pt declines any SOB, chest pain, new onset of numbness/ tingiling  Pt rates pain at 8/10, on a scale of 1-10, pt medicated per MAR  Pt is voiding adequatly and without hesitancy, purwick in place   Pt has + flatus, + bowel sounds, + BM on 6/13/2022  Pt x1 pivot to the commode    Pt is tolerating a regular diet, pt denies any nausea/vomiting  Pt has LLE wound, vac in place, no leaks noted   Pt has healed incisions to right check, open to air, no drainage noted, tenderness to sight, bruising also noted around eye  Pt pending bed at [facility]""'s     Plan of care discussed, all questions answered. Explained importance of calling before getting OOB and pt verbalizes understanding. Explained importance of oral care. Call light is within reach, treaded slipper socks on, bed in lowest/ locked position, hourly rounding in place, all needs met at this time

## 2022-06-15 NOTE — CARE PLAN
The patient is Stable - Low risk of patient condition declining or worsening    Shift Goals  Clinical Goals: Pain control  Patient Goals: Rest  Family Goals: No family present at this time    Progress made toward(s) clinical / shift goals:      Problem: Pain - Standard  Goal: Alleviation of pain or a reduction in pain to the patient’s comfort goal  Outcome: Progressing   Pain medication administered PRN and reassessed frequently.    Problem: Knowledge Deficit - Standard  Goal: Patient and family/care givers will demonstrate understanding of plan of care, disease process/condition, diagnostic tests and medications  Outcome: Progressing   Educated patient on plan of care and medications. All questions addressed.       Patient is not progressing towards the following goals:  N/a

## 2022-06-15 NOTE — PROGRESS NOTES
Report received from ti RN, assumed care at 1900.  Assessment complete.  A&O x 4. Patient calls appropriately.  Bed alarm on.  Patient has 8/10 pain. Pain managed with prescribed medications.  Denies N&V. Tolerating regular diet.  RLE wound vac with Dakins solution.   + void via purewick, + flatus, - BM.  Patient denies SOB. On room air.  Patient calm and cooperative.  Review plan with of care with patient. Call light and personal belongings within reach. Hourly rounding in place. All needs met at this time.

## 2022-06-15 NOTE — CARE PLAN
The patient is Stable - Low risk of patient condition declining or worsening    Shift Goals  Clinical Goals: Pain control, rest, awaiting discharge   Patient Goals: Rest  Family Goals: No family present at this time    Progress made toward(s) clinical / shift goals:        Problem: Pain - Standard  Goal: Alleviation of pain or a reduction in pain to the patient’s comfort goal  Outcome: Progressing     Problem: Knowledge Deficit - Standard  Goal: Patient and family/care givers will demonstrate understanding of plan of care, disease process/condition, diagnostic tests and medications  Outcome: Progressing     Problem: Skin Integrity  Goal: Skin integrity is maintained or improved  Outcome: Progressing     Problem: Fall Risk  Goal: Patient will remain free from falls  Outcome: Progressing

## 2022-06-15 NOTE — PROGRESS NOTES
[facility] Medicine Daily Progress Note    Date of Service  6/15/2022    Chief Complaint  Karine Ovalle is a 57 y.o. female admitted 6/1/2022 with facial fractures.    Hospital Course  Admitted with facial fractures, facial laceration after a ground-level fall.  Facial laceration was repaired at the ED.  CT scan showed comminuted fracture of the nasal bridge involving bilateral medial orbital walls, there is a fracture of the lateral right orbital wall and ZMC, there is a fracture of the orbital floor, Fracture of the lateral wall of the right maxillary sinus which is depressed, Fracture of the right maxillary spine.  Oral maxillofacial surgery was consulted on the case, recommendation was conservative management.  Patient also with known history of chronic right lower extremity wound, and chronic lymphedema, it appeared to have probable cellulitis.  Patient was initially started on empiric coverage with IV vancomycin and Zosyn.  Cultures were noted to be negative.  Antibiotics were changed to ciprofloxacin and Bactrim.  Wound care was consulted on the case.    Interval Problem Update  Facial fractures -  pain controlled  Right leg wound - pain controlled    At bedside, no acute complain from patient. A bit disappointed with pending bed a LTACH. She asked me to help fill the temporary disability form.    Wound dressing change 6/14 appreciated - no longer foul smelling drainage.     Otherwise, she has been medically stable to be DC to LTACH - pending bed.     Labs and imagins reviewed.     I have personally seen and examined the patient at bedside. I discussed the plan of care with patient, bedside RN, charge RN and .    Consultants/Specialty  OMF surgery, LPS    Code Status  Full Code    Disposition  Patient is medically cleared for discharge.   Anticipate discharge to to a long-term acute care hospital.  I have placed the appropriate orders for post-discharge needs.    Review of Systems  Review of  Systems   Constitutional: Negative for chills, diaphoresis, fever and malaise/fatigue.   HENT: Negative for congestion, hearing loss and sore throat.    Eyes: Negative for blurred vision.   Respiratory: Negative for cough, shortness of breath and wheezing.    Cardiovascular: Positive for leg swelling. Negative for chest pain and palpitations.   Gastrointestinal: Negative for abdominal pain, diarrhea, heartburn, nausea and vomiting.   Genitourinary: Negative for dysuria, flank pain and hematuria.   Musculoskeletal: Negative for back pain, joint pain, myalgias and neck pain.   Skin: Negative for rash.   Neurological: Positive for weakness. Negative for dizziness, sensory change, speech change, focal weakness and headaches.   Psychiatric/Behavioral: The patient is not nervous/anxious.         Physical Exam  Temp:  [36.2 °C (97.1 °F)-36.7 °C (98 °F)] 36.7 °C (98 °F)  Pulse:  [76-98] 90  Resp:  [18-20] 18  BP: (100-114)/(59-65) 100/65  SpO2:  [95 %-98 %] 98 %    Physical Exam  Vitals and nursing note reviewed.   Constitutional:       Appearance: She is obese.   HENT:      Head: Normocephalic.      Comments: Lacerations at right cheek with stitches     Nose: No congestion.      Mouth/Throat:      Mouth: Mucous membranes are moist.   Eyes:      Extraocular Movements: Extraocular movements intact.      Conjunctiva/sclera: Conjunctivae normal.   Cardiovascular:      Rate and Rhythm: Normal rate and regular rhythm.   Pulmonary:      Effort: Pulmonary effort is normal.      Breath sounds: Normal breath sounds.   Abdominal:      General: There is no distension.      Tenderness: There is no abdominal tenderness. There is no guarding or rebound.   Musculoskeletal:      Cervical back: No tenderness.      Right lower leg: Edema present.      Left lower leg: Edema present.      Comments: Wound VAC right leg   Skin:     General: Skin is warm and dry.   Neurological:      General: No focal deficit present.      Mental Status: She is  alert and oriented to person, place, and time.      Cranial Nerves: No cranial nerve deficit.         Fluids    Intake/Output Summary (Last 24 hours) at 6/15/2022 1440  Last data filed at 6/15/2022 0512  Gross per 24 hour   Intake 660 ml   Output 800 ml   Net -140 ml       Laboratory  Recent Labs     06/13/22  1128 06/14/22  0154 06/15/22  0442   WBC 8.0 7.7 8.0   RBC 3.19* 3.40* 3.30*   HEMOGLOBIN 9.0* 9.5* 9.4*   HEMATOCRIT 26.8* 28.3* 27.9*   MCV 84.0 83.2 84.5   MCH 28.2 27.9 28.5   MCHC 33.6 33.6 33.7   RDW 63.2* 62.1* 62.3*   PLATELETCT 427 449* 467*   MPV 9.1 9.4 9.2     Recent Labs     06/13/22  1128 06/14/22  0154 06/15/22  0442   SODIUM 135 136 136   POTASSIUM 3.9 4.3 4.0   CHLORIDE 102 102 102   CO2 24 27 26   GLUCOSE 110* 93 108*   BUN 8 8 9   CREATININE 0.47* 0.46* 0.50   CALCIUM 8.3* 8.4* 8.6                   Imaging  EC-ECHOCARDIOGRAM COMPLETE W/O CONT   Final Result      US-EXTREMITY VENOUS LOWER BILAT   Final Result      CT-HEAD W/O   Final Result      1.  Multiple facial fractures as above.   2.  No acute intracranial hemorrhage or mass effect.         CT-MAXILLOFACIAL W/O PLUS RECONS   Final Result      1.  There is a comminuted fracture of the nasal bridge involving bilateral medial orbital walls.   2.  There is a fracture of the lateral right orbital wall and ZMC.   3.  There is a fracture of the orbital floor   4.  Fracture of the lateral wall of the right maxillary sinus which is depressed.   5.  Fracture of the right maxillary spine.   6.  There is thickening of the right lateral rectus muscle. No posterior orbital hematoma. There are a few foci of postorbital gas. There is periorbital swelling on the right.   7.  There is no fracture of the pterygoids. No mandibular fracture.   8.  There is odontogenic disease.      DX-TIBIA AND FIBULA RIGHT   Final Result      1.  Large soft tissue defect along the anterior, proximal shin with no radiographic findings to suggest osteomyelitis.   2.   Diffuse, decreased bone mineral density.           Assessment/Plan  * Multiple closed fractures of facial bone (HCC)- (present on admission)  Assessment & Plan  Pain control    Hypomagnesemia- (present on admission)  Assessment & Plan  oral Mg  Follow level    Serum gamma globulin increased- (present on admission)  Assessment & Plan  possibly secondary to cellulitis  Follow up as outpatient    Facial laceration- (present on admission)  Assessment & Plan  Wound care    Fall- (present on admission)  Assessment & Plan  PT and OT      Anemia, chronic disease- (present on admission)  Assessment & Plan  Follow cbc    Near syncope- (present on admission)  Assessment & Plan  PT and OT      Asthma- (present on admission)  Assessment & Plan  Flovent, RT protocol    Chronic pain syndrome- (present on admission)  Assessment & Plan  MS Contin, Percocet, Gabapentin    Open wound of right lower extremity- (present on admission)  Assessment & Plan  Wound care    Cellulitis of right lower extremity- (present on admission)  Assessment & Plan  finished course of Ciprofloxacin, Bactrim    Hypokalemia  Assessment & Plan  Kdur, follow bmp    Lymphedema- (present on admission)  Assessment & Plan  Lasix    Obesity (BMI 30-39.9)- (present on admission)  Assessment & Plan  Body mass index is 34.05 kg/m².    Hyponatremia- (present on admission)  Assessment & Plan  Follow bmp    History of gastric bypass- (present on admission)  Assessment & Plan  Nutrition consult         VTE prophylaxis: enoxaparin ppx    I have performed a physical exam and reviewed and updated ROS and Plan today (6/15/2022).

## 2022-06-16 LAB
ANION GAP SERPL CALC-SCNC: 9 MMOL/L (ref 7–16)
BUN SERPL-MCNC: 9 MG/DL (ref 8–22)
CALCIUM SERPL-MCNC: 8.9 MG/DL (ref 8.5–10.5)
CHLORIDE SERPL-SCNC: 103 MMOL/L (ref 96–112)
CO2 SERPL-SCNC: 25 MMOL/L (ref 20–33)
CREAT SERPL-MCNC: 0.54 MG/DL (ref 0.5–1.4)
ERYTHROCYTE [DISTWIDTH] IN BLOOD BY AUTOMATED COUNT: 59.7 FL (ref 35.9–50)
GFR SERPLBLD CREATININE-BSD FMLA CKD-EPI: 107 ML/MIN/1.73 M 2
GLUCOSE SERPL-MCNC: 80 MG/DL (ref 65–99)
HCT VFR BLD AUTO: 27.1 % (ref 37–47)
HGB BLD-MCNC: 9.3 G/DL (ref 12–16)
MCH RBC QN AUTO: 28.5 PG (ref 27–33)
MCHC RBC AUTO-ENTMCNC: 34.3 G/DL (ref 33.6–35)
MCV RBC AUTO: 83.1 FL (ref 81.4–97.8)
PLATELET # BLD AUTO: 460 K/UL (ref 164–446)
PMV BLD AUTO: 9.2 FL (ref 9–12.9)
POTASSIUM SERPL-SCNC: 4.7 MMOL/L (ref 3.6–5.5)
RBC # BLD AUTO: 3.26 M/UL (ref 4.2–5.4)
SODIUM SERPL-SCNC: 137 MMOL/L (ref 135–145)
WBC # BLD AUTO: 7.6 K/UL (ref 4.8–10.8)

## 2022-06-16 PROCEDURE — 80048 BASIC METABOLIC PNL TOTAL CA: CPT

## 2022-06-16 PROCEDURE — 97530 THERAPEUTIC ACTIVITIES: CPT

## 2022-06-16 PROCEDURE — 770001 HCHG ROOM/CARE - MED/SURG/GYN PRIV*

## 2022-06-16 PROCEDURE — A9270 NON-COVERED ITEM OR SERVICE: HCPCS | Performed by: STUDENT IN AN ORGANIZED HEALTH CARE EDUCATION/TRAINING PROGRAM

## 2022-06-16 PROCEDURE — 700102 HCHG RX REV CODE 250 W/ 637 OVERRIDE(OP): Performed by: STUDENT IN AN ORGANIZED HEALTH CARE EDUCATION/TRAINING PROGRAM

## 2022-06-16 PROCEDURE — 97535 SELF CARE MNGMENT TRAINING: CPT

## 2022-06-16 PROCEDURE — 700102 HCHG RX REV CODE 250 W/ 637 OVERRIDE(OP): Performed by: FAMILY MEDICINE

## 2022-06-16 PROCEDURE — 700102 HCHG RX REV CODE 250 W/ 637 OVERRIDE(OP): Performed by: NURSE PRACTITIONER

## 2022-06-16 PROCEDURE — 85027 COMPLETE CBC AUTOMATED: CPT

## 2022-06-16 PROCEDURE — A9270 NON-COVERED ITEM OR SERVICE: HCPCS | Performed by: NURSE PRACTITIONER

## 2022-06-16 PROCEDURE — 36415 COLL VENOUS BLD VENIPUNCTURE: CPT

## 2022-06-16 PROCEDURE — 99232 SBSQ HOSP IP/OBS MODERATE 35: CPT | Performed by: STUDENT IN AN ORGANIZED HEALTH CARE EDUCATION/TRAINING PROGRAM

## 2022-06-16 PROCEDURE — 700111 HCHG RX REV CODE 636 W/ 250 OVERRIDE (IP): Performed by: STUDENT IN AN ORGANIZED HEALTH CARE EDUCATION/TRAINING PROGRAM

## 2022-06-16 PROCEDURE — A9270 NON-COVERED ITEM OR SERVICE: HCPCS | Performed by: FAMILY MEDICINE

## 2022-06-16 PROCEDURE — A9270 NON-COVERED ITEM OR SERVICE: HCPCS | Performed by: INTERNAL MEDICINE

## 2022-06-16 PROCEDURE — 700102 HCHG RX REV CODE 250 W/ 637 OVERRIDE(OP): Performed by: INTERNAL MEDICINE

## 2022-06-16 RX ADMIN — MULTIPLE VITAMINS W/ MINERALS TAB 1 TABLET: TAB at 06:07

## 2022-06-16 RX ADMIN — OXYCODONE AND ACETAMINOPHEN 1 TABLET: 10; 325 TABLET ORAL at 09:00

## 2022-06-16 RX ADMIN — GABAPENTIN 800 MG: 400 CAPSULE ORAL at 18:43

## 2022-06-16 RX ADMIN — FLUTICASONE PROPIONATE 88 MCG: 44 AEROSOL, METERED RESPIRATORY (INHALATION) at 06:06

## 2022-06-16 RX ADMIN — OXYCODONE AND ACETAMINOPHEN 1 TABLET: 10; 325 TABLET ORAL at 18:43

## 2022-06-16 RX ADMIN — FERROUS SULFATE TAB 325 MG (65 MG ELEMENTAL FE) 325 MG: 325 (65 FE) TAB at 09:00

## 2022-06-16 RX ADMIN — OXYCODONE HYDROCHLORIDE 10 MG: 10 TABLET ORAL at 02:37

## 2022-06-16 RX ADMIN — FLUTICASONE PROPIONATE 88 MCG: 44 AEROSOL, METERED RESPIRATORY (INHALATION) at 18:43

## 2022-06-16 RX ADMIN — FUROSEMIDE 40 MG: 20 TABLET ORAL at 06:06

## 2022-06-16 RX ADMIN — OXYCODONE HYDROCHLORIDE AND ACETAMINOPHEN 500 MG: 500 TABLET ORAL at 06:07

## 2022-06-16 RX ADMIN — OXYCODONE AND ACETAMINOPHEN 1 TABLET: 10; 325 TABLET ORAL at 13:33

## 2022-06-16 RX ADMIN — Medication 400 MG: at 06:06

## 2022-06-16 RX ADMIN — MORPHINE SULFATE 15 MG: 15 TABLET, FILM COATED, EXTENDED RELEASE ORAL at 20:44

## 2022-06-16 RX ADMIN — OXYCODONE 5 MG: 5 TABLET ORAL at 11:57

## 2022-06-16 RX ADMIN — ENOXAPARIN SODIUM 40 MG: 40 INJECTION SUBCUTANEOUS at 18:43

## 2022-06-16 RX ADMIN — GABAPENTIN 800 MG: 400 CAPSULE ORAL at 09:00

## 2022-06-16 RX ADMIN — OXYCODONE AND ACETAMINOPHEN 1 TABLET: 10; 325 TABLET ORAL at 20:45

## 2022-06-16 RX ADMIN — POTASSIUM CHLORIDE 20 MEQ: 1500 TABLET, EXTENDED RELEASE ORAL at 06:07

## 2022-06-16 RX ADMIN — GABAPENTIN 800 MG: 400 CAPSULE ORAL at 13:33

## 2022-06-16 RX ADMIN — GABAPENTIN 800 MG: 400 CAPSULE ORAL at 20:45

## 2022-06-16 RX ADMIN — Medication 400 MG: at 18:43

## 2022-06-16 RX ADMIN — MORPHINE SULFATE 15 MG: 15 TABLET, FILM COATED, EXTENDED RELEASE ORAL at 09:00

## 2022-06-16 ASSESSMENT — ENCOUNTER SYMPTOMS
DIAPHORESIS: 0
FEVER: 0
MYALGIAS: 0
SENSORY CHANGE: 0
DIARRHEA: 0
FOCAL WEAKNESS: 0
DIZZINESS: 0
HEADACHES: 0
NECK PAIN: 0
COUGH: 0
PALPITATIONS: 0
NAUSEA: 0
HEARTBURN: 0
BACK PAIN: 0
SPEECH CHANGE: 0
WEAKNESS: 1
VOMITING: 0
WHEEZING: 0
FLANK PAIN: 0
CHILLS: 0
ABDOMINAL PAIN: 0
NERVOUS/ANXIOUS: 0
SHORTNESS OF BREATH: 0
BLURRED VISION: 0
SORE THROAT: 0

## 2022-06-16 ASSESSMENT — COGNITIVE AND FUNCTIONAL STATUS - GENERAL
SUGGESTED CMS G CODE MODIFIER DAILY ACTIVITY: CJ
DAILY ACTIVITIY SCORE: 20
DRESSING REGULAR LOWER BODY CLOTHING: A LOT
TOILETING: A LITTLE
HELP NEEDED FOR BATHING: A LITTLE

## 2022-06-16 ASSESSMENT — PAIN DESCRIPTION - PAIN TYPE
TYPE: ACUTE PAIN

## 2022-06-16 ASSESSMENT — GAIT ASSESSMENTS: DISTANCE (FEET): 15

## 2022-06-16 NOTE — DISCHARGE PLANNING
Agency/Facility Name: [facility]   Spoke To: Erin   Outcome: Per Erin they are unsure if they will have a bed available today.

## 2022-06-16 NOTE — CARE PLAN
The patient is Stable - Low risk of patient condition declining or worsening    Shift Goals  Clinical Goals: Pain control  Patient Goals: Rest  Family Goals: No family present at this time    Progress made toward(s) clinical / shift goals:      Problem: Pain - Standard  Goal: Alleviation of pain or a reduction in pain to the patient’s comfort goal  Outcome: Progressing   Pain mediation administered PRN and reassessed frequently.    Problem: Knowledge Deficit - Standard  Goal: Patient and family/care givers will demonstrate understanding of plan of care, disease process/condition, diagnostic tests and medications  Outcome: Progressing   Educated patient on plan of care and medications.       Patient is not progressing towards the following goals:  N/a

## 2022-06-16 NOTE — PROGRESS NOTES
Report received from dayshift RN, assumed care at 1900.  Assessment complete.  A&O x 4. Patient calls appropriately.  Patient ambulates with x1 assist pivot. Bed alarm refused.   Patient has 8/10 pain. Pain managed with prescribed medications.  WV to RLE with Dakins solution.   Denies N&V. Tolerating regular diet.  + void via purewick, + flatus, - BM.  Patient denies SOB. On room air  Patient calm and cooperative.  Review plan with of care with patient. Call light and personal belongings within reach. Hourly rounding in place. All needs met at this time.

## 2022-06-16 NOTE — PROGRESS NOTES
Hospital Medicine Daily Progress Note    Date of Service  6/16/2022    Chief Complaint  Karine Ovalle is a 57 y.o. female admitted 6/1/2022 with facial fractures.    Hospital Course  Admitted with facial fractures, facial laceration after a ground-level fall.  Facial laceration was repaired at the ED.  CT scan showed comminuted fracture of the nasal bridge involving bilateral medial orbital walls, there is a fracture of the lateral right orbital wall and ZMC, there is a fracture of the orbital floor, Fracture of the lateral wall of the right maxillary sinus which is depressed, Fracture of the right maxillary spine.  Oral maxillofacial surgery was consulted on the case, recommendation was conservative management.  Patient also with known history of chronic right lower extremity wound, and chronic lymphedema, it appeared to have probable cellulitis.  Patient was initially started on empiric coverage with IV vancomycin and Zosyn.  Cultures were noted to be negative.  Antibiotics were changed to ciprofloxacin and Bactrim.  Wound care was consulted on the case.    Otherwise, she has been medically stable to be DC to [facility] - pending bed.     Interval Problem Update  Facial fractures -  pain controlled  Right leg wound - pain controlled, wound vac change every other day     At bedside, no acute complain. We discussed about wound dressing change. DC planning discussed - [facility] is still pending but we will need an alternative DC arrangement. Pt declined SNFs options. Another option would be home with home health and visiting wound nurses/outpatient wound follow up.     I have requested PT/OT eval.     Labs and imagings reviewed.     I have personally seen and examined the patient at bedside. I discussed the plan of care with patient, bedside RN, charge RN and .    Consultants/Specialty  F surgery, LPS    Code Status  Full Code    Disposition  Patient is medically cleared for discharge.   Anticipate discharge  to to a long-term acute care hospital.  I have placed the appropriate orders for post-discharge needs.    Review of Systems  Review of Systems   Constitutional: Negative for chills, diaphoresis, fever and malaise/fatigue.   HENT: Negative for congestion, hearing loss and sore throat.    Eyes: Negative for blurred vision.   Respiratory: Negative for cough, shortness of breath and wheezing.    Cardiovascular: Positive for leg swelling. Negative for chest pain and palpitations.   Gastrointestinal: Negative for abdominal pain, diarrhea, heartburn, nausea and vomiting.   Genitourinary: Negative for dysuria, flank pain and hematuria.   Musculoskeletal: Negative for back pain, joint pain, myalgias and neck pain.   Skin: Negative for rash.   Neurological: Positive for weakness. Negative for dizziness, sensory change, speech change, focal weakness and headaches.   Psychiatric/Behavioral: The patient is not nervous/anxious.         Physical Exam  Temp:  [36.1 °C (97 °F)-37.2 °C (98.9 °F)] 37.2 °C (98.9 °F)  Pulse:  [82-99] 93  Resp:  [17-18] 17  BP: (102-129)/(48-77) 102/48  SpO2:  [91 %-97 %] 91 %    Physical Exam  Vitals and nursing note reviewed.   Constitutional:       Appearance: She is obese.   HENT:      Head: Normocephalic.      Comments: Lacerations at right cheek with stitches     Nose: No congestion.      Mouth/Throat:      Mouth: Mucous membranes are moist.   Eyes:      Extraocular Movements: Extraocular movements intact.      Conjunctiva/sclera: Conjunctivae normal.   Cardiovascular:      Rate and Rhythm: Normal rate and regular rhythm.   Pulmonary:      Effort: Pulmonary effort is normal.      Breath sounds: Normal breath sounds.   Abdominal:      General: There is no distension.      Tenderness: There is no abdominal tenderness. There is no guarding or rebound.   Musculoskeletal:      Cervical back: No tenderness.      Right lower leg: Edema present.      Left lower leg: Edema present.      Comments: Wound VAC  right leg   Skin:     General: Skin is warm and dry.   Neurological:      General: No focal deficit present.      Mental Status: She is alert and oriented to person, place, and time.      Cranial Nerves: No cranial nerve deficit.         Fluids    Intake/Output Summary (Last 24 hours) at 6/16/2022 1234  Last data filed at 6/16/2022 1100  Gross per 24 hour   Intake 540 ml   Output 2700 ml   Net -2160 ml       Laboratory  Recent Labs     06/14/22  0154 06/15/22  0442 06/16/22  0751   WBC 7.7 8.0 7.6   RBC 3.40* 3.30* 3.26*   HEMOGLOBIN 9.5* 9.4* 9.3*   HEMATOCRIT 28.3* 27.9* 27.1*   MCV 83.2 84.5 83.1   MCH 27.9 28.5 28.5   MCHC 33.6 33.7 34.3   RDW 62.1* 62.3* 59.7*   PLATELETCT 449* 467* 460*   MPV 9.4 9.2 9.2     Recent Labs     06/14/22  0154 06/15/22  0442 06/16/22  0221   SODIUM 136 136 137   POTASSIUM 4.3 4.0 4.7   CHLORIDE 102 102 103   CO2 27 26 25   GLUCOSE 93 108* 80   BUN 8 9 9   CREATININE 0.46* 0.50 0.54   CALCIUM 8.4* 8.6 8.9                   Imaging  EC-ECHOCARDIOGRAM COMPLETE W/O CONT   Final Result      US-EXTREMITY VENOUS LOWER BILAT   Final Result      CT-HEAD W/O   Final Result      1.  Multiple facial fractures as above.   2.  No acute intracranial hemorrhage or mass effect.         CT-MAXILLOFACIAL W/O PLUS RECONS   Final Result      1.  There is a comminuted fracture of the nasal bridge involving bilateral medial orbital walls.   2.  There is a fracture of the lateral right orbital wall and ZMC.   3.  There is a fracture of the orbital floor   4.  Fracture of the lateral wall of the right maxillary sinus which is depressed.   5.  Fracture of the right maxillary spine.   6.  There is thickening of the right lateral rectus muscle. No posterior orbital hematoma. There are a few foci of postorbital gas. There is periorbital swelling on the right.   7.  There is no fracture of the pterygoids. No mandibular fracture.   8.  There is odontogenic disease.      DX-TIBIA AND FIBULA RIGHT   Final Result       1.  Large soft tissue defect along the anterior, proximal shin with no radiographic findings to suggest osteomyelitis.   2.  Diffuse, decreased bone mineral density.           Assessment/Plan  * Multiple closed fractures of facial bone (HCC)- (present on admission)  Assessment & Plan  Pain control    Hypomagnesemia- (present on admission)  Assessment & Plan  oral Mg  Follow level    Serum gamma globulin increased- (present on admission)  Assessment & Plan  possibly secondary to cellulitis  Follow up as outpatient    Facial laceration- (present on admission)  Assessment & Plan  Wound care    Fall- (present on admission)  Assessment & Plan  PT and OT      Anemia, chronic disease- (present on admission)  Assessment & Plan  Follow cbc    Near syncope- (present on admission)  Assessment & Plan  PT and OT      Asthma- (present on admission)  Assessment & Plan  Flovent, RT protocol    Chronic pain syndrome- (present on admission)  Assessment & Plan  MS Contin, Percocet, Gabapentin    Open wound of right lower extremity- (present on admission)  Assessment & Plan  Wound care    Cellulitis of right lower extremity- (present on admission)  Assessment & Plan  finished course of Ciprofloxacin, Bactrim    Hypokalemia  Assessment & Plan  Kdur, follow bmp    Lymphedema- (present on admission)  Assessment & Plan  Lasix    Obesity (BMI 30-39.9)- (present on admission)  Assessment & Plan  Body mass index is 34.05 kg/m².    Hyponatremia- (present on admission)  Assessment & Plan  Follow bmp    History of gastric bypass- (present on admission)  Assessment & Plan  Nutrition consult         VTE prophylaxis: enoxaparin ppx    I have performed a physical exam and reviewed and updated ROS and Plan today (6/16/2022).

## 2022-06-16 NOTE — THERAPY
"Occupational Therapy  Daily Treatment     Patient Name: Karine Ovalle  Age:  57 y.o., Sex:  female  Medical Record #: [id number]  Today's Date: 6/16/2022    Precautions: Fall Risk  Comments: RLE wound vac    Assessment    Pt seen for OT tx to include: bed mobility, transfer training, LB dressing, standing gomez care, standing oral care. Pt used leg  to facilitate management of RLE. Minimal tolerance for R knee flexion negatively impacts shoe donning. Ongoing education on attempting to mobilize to bathroom for toileting. Pt cites urgency as limiting factor. Recommend at least using BSC to wean self off Pure-Wick. Pt is progressing towards OT goals. Continues to be primarily limited by pain. Will benefit from continued acute OT.     Plan    Continue current treatment plan.    DC Equipment Recommendations: Tub Transfer Bench  Discharge Recommendations: Recommend post-acute placement for additional occupational therapy services prior to discharge home    Subjective    \"I still smell poop,\"     Objective       06/16/22 1154   Other Treatments   Other Treatments Provided Ongoing education on importance of increasing OOB activity, mobilizing to bathroom for toileting (or BSC if urgent)   Balance   Sitting Balance (Static) Good   Sitting Balance (Dynamic) Good   Standing Balance (Static) Fair   Standing Balance (Dynamic) Fair -   Weight Shift Sitting Good   Weight Shift Standing Fair   Comments FWW for standing   Bed Mobility    Supine to Sit Minimal Assist  (using leg  for RLE; slight assist to prevent shearing on damp pad)   Scooting Standby Assist  (seated)   Activities of Daily Living   Grooming Standby Assist;Standing  (oral care at sink)   Bathing Contact Guard Assist  (gomez hygiene and UB in standing)   Lower Body Dressing Moderate Assist  (don B sneakers)   Toileting   (NT; declined need (using Pure Wick))   Functional Mobility   Sit to Stand Standby Assist   Bed, Chair, Wheelchair Transfer Standby " Assist   Transfer Method Stand Step  (FWW)   Mobility Supine > EOB, short gait and transfers with FWW   Short Term Goals   Short Term Goal # 1 Pt will complete ADL transfers with min A   Goal Outcome # 1 Goal met, new goal added   Short Term Goal # 1 B  Pt will complete ADL transfers with supv   Short Term Goal # 2 Pt will complete LB dressing with min A using AE   Goal Outcome # 2 Progressing as expected   Short Term Goal # 3 Pt will complete standing grooming with SBA   Goal Outcome # 3 Goal met, new goal added   Short Term Goal # 3 B Pt will complete toileting with supv

## 2022-06-16 NOTE — PROGRESS NOTES
Received report from previous shift RN  Assessment complete.  A&O x 4. Patient calls appropriately.  Patient ambulates with x1 assist to pivot. Bed alarm refused.   Patient has 9/10 pain. Pain managed with prescribed medications.  Denies N&V. Tolerating regular diet.  Sutures to R face CDI. Wound vac with dakins veraflow to RLE  + void, + flatus, + BM.  Patient denies SOB.  Patient encouraged OOB, OT/PT to work with patient today. Pt agreeable to mobilization with nursing staff  Review plan with of care with patient. Call light and personal belongings with in reach. Hourly rounding in place. All needs met at this time.

## 2022-06-17 VITALS
BODY MASS INDEX: 32.97 KG/M2 | SYSTOLIC BLOOD PRESSURE: 114 MMHG | TEMPERATURE: 98 F | HEART RATE: 98 BPM | WEIGHT: 186.07 LBS | DIASTOLIC BLOOD PRESSURE: 69 MMHG | HEIGHT: 63 IN | OXYGEN SATURATION: 97 % | RESPIRATION RATE: 18 BRPM

## 2022-06-17 LAB
ANION GAP SERPL CALC-SCNC: 9 MMOL/L (ref 7–16)
BUN SERPL-MCNC: 8 MG/DL (ref 8–22)
CALCIUM SERPL-MCNC: 8.7 MG/DL (ref 8.5–10.5)
CHLORIDE SERPL-SCNC: 104 MMOL/L (ref 96–112)
CO2 SERPL-SCNC: 27 MMOL/L (ref 20–33)
CREAT SERPL-MCNC: 0.48 MG/DL (ref 0.5–1.4)
ERYTHROCYTE [DISTWIDTH] IN BLOOD BY AUTOMATED COUNT: 62.4 FL (ref 35.9–50)
GFR SERPLBLD CREATININE-BSD FMLA CKD-EPI: 110 ML/MIN/1.73 M 2
GLUCOSE SERPL-MCNC: 85 MG/DL (ref 65–99)
HCT VFR BLD AUTO: 29.8 % (ref 37–47)
HGB BLD-MCNC: 10 G/DL (ref 12–16)
MCH RBC QN AUTO: 28.6 PG (ref 27–33)
MCHC RBC AUTO-ENTMCNC: 33.6 G/DL (ref 33.6–35)
MCV RBC AUTO: 85.1 FL (ref 81.4–97.8)
PLATELET # BLD AUTO: 400 K/UL (ref 164–446)
PMV BLD AUTO: 8.9 FL (ref 9–12.9)
POTASSIUM SERPL-SCNC: 4.2 MMOL/L (ref 3.6–5.5)
RBC # BLD AUTO: 3.5 M/UL (ref 4.2–5.4)
SODIUM SERPL-SCNC: 140 MMOL/L (ref 135–145)
WBC # BLD AUTO: 8.2 K/UL (ref 4.8–10.8)

## 2022-06-17 PROCEDURE — 700102 HCHG RX REV CODE 250 W/ 637 OVERRIDE(OP): Performed by: INTERNAL MEDICINE

## 2022-06-17 PROCEDURE — 97597 DBRDMT OPN WND 1ST 20 CM/<: CPT

## 2022-06-17 PROCEDURE — A9270 NON-COVERED ITEM OR SERVICE: HCPCS | Performed by: NURSE PRACTITIONER

## 2022-06-17 PROCEDURE — 700102 HCHG RX REV CODE 250 W/ 637 OVERRIDE(OP): Performed by: STUDENT IN AN ORGANIZED HEALTH CARE EDUCATION/TRAINING PROGRAM

## 2022-06-17 PROCEDURE — 85027 COMPLETE CBC AUTOMATED: CPT

## 2022-06-17 PROCEDURE — A9270 NON-COVERED ITEM OR SERVICE: HCPCS | Performed by: FAMILY MEDICINE

## 2022-06-17 PROCEDURE — 97605 NEG PRS WND THER DME<=50SQCM: CPT

## 2022-06-17 PROCEDURE — 700111 HCHG RX REV CODE 636 W/ 250 OVERRIDE (IP): Performed by: STUDENT IN AN ORGANIZED HEALTH CARE EDUCATION/TRAINING PROGRAM

## 2022-06-17 PROCEDURE — 700111 HCHG RX REV CODE 636 W/ 250 OVERRIDE (IP): Performed by: NURSE PRACTITIONER

## 2022-06-17 PROCEDURE — A9270 NON-COVERED ITEM OR SERVICE: HCPCS | Performed by: INTERNAL MEDICINE

## 2022-06-17 PROCEDURE — A9270 NON-COVERED ITEM OR SERVICE: HCPCS | Performed by: STUDENT IN AN ORGANIZED HEALTH CARE EDUCATION/TRAINING PROGRAM

## 2022-06-17 PROCEDURE — 99239 HOSP IP/OBS DSCHRG MGMT >30: CPT | Performed by: STUDENT IN AN ORGANIZED HEALTH CARE EDUCATION/TRAINING PROGRAM

## 2022-06-17 PROCEDURE — 80048 BASIC METABOLIC PNL TOTAL CA: CPT

## 2022-06-17 PROCEDURE — 36415 COLL VENOUS BLD VENIPUNCTURE: CPT

## 2022-06-17 PROCEDURE — 700102 HCHG RX REV CODE 250 W/ 637 OVERRIDE(OP): Performed by: FAMILY MEDICINE

## 2022-06-17 PROCEDURE — 700102 HCHG RX REV CODE 250 W/ 637 OVERRIDE(OP): Performed by: NURSE PRACTITIONER

## 2022-06-17 RX ORDER — CELECOXIB 200 MG/1
200 CAPSULE ORAL 2 TIMES DAILY PRN
Qty: 60 CAPSULE | Status: SHIPPED
Start: 2022-06-17 | End: 2022-08-29

## 2022-06-17 RX ORDER — LANOLIN ALCOHOL/MO/W.PET/CERES
400 CREAM (GRAM) TOPICAL 2 TIMES DAILY
Qty: 30 TABLET | Status: ON HOLD
Start: 2022-06-17 | End: 2023-06-07

## 2022-06-17 RX ORDER — ONDANSETRON 4 MG/1
4 TABLET, ORALLY DISINTEGRATING ORAL EVERY 4 HOURS PRN
Qty: 10 TABLET | Refills: 0 | Status: SHIPPED
Start: 2022-06-17 | End: 2022-08-29

## 2022-06-17 RX ORDER — ASCORBIC ACID 500 MG
500 TABLET ORAL DAILY
Qty: 30 TABLET | Status: SHIPPED
Start: 2022-06-18 | End: 2022-08-29 | Stop reason: SDUPTHER

## 2022-06-17 RX ADMIN — Medication 400 MG: at 05:01

## 2022-06-17 RX ADMIN — FLUTICASONE PROPIONATE 88 MCG: 44 AEROSOL, METERED RESPIRATORY (INHALATION) at 17:54

## 2022-06-17 RX ADMIN — FLUTICASONE PROPIONATE 88 MCG: 44 AEROSOL, METERED RESPIRATORY (INHALATION) at 05:00

## 2022-06-17 RX ADMIN — HYDROMORPHONE HYDROCHLORIDE 0.5 MG: 1 INJECTION, SOLUTION INTRAMUSCULAR; INTRAVENOUS; SUBCUTANEOUS at 14:50

## 2022-06-17 RX ADMIN — MORPHINE SULFATE 15 MG: 15 TABLET, FILM COATED, EXTENDED RELEASE ORAL at 21:43

## 2022-06-17 RX ADMIN — GABAPENTIN 800 MG: 400 CAPSULE ORAL at 17:40

## 2022-06-17 RX ADMIN — FUROSEMIDE 40 MG: 20 TABLET ORAL at 05:01

## 2022-06-17 RX ADMIN — GABAPENTIN 800 MG: 400 CAPSULE ORAL at 21:43

## 2022-06-17 RX ADMIN — MULTIPLE VITAMINS W/ MINERALS TAB 1 TABLET: TAB at 05:01

## 2022-06-17 RX ADMIN — Medication 400 MG: at 17:40

## 2022-06-17 RX ADMIN — MORPHINE SULFATE 15 MG: 15 TABLET, FILM COATED, EXTENDED RELEASE ORAL at 10:18

## 2022-06-17 RX ADMIN — DOCUSATE SODIUM 50 MG AND SENNOSIDES 8.6 MG 2 TABLET: 8.6; 5 TABLET, FILM COATED ORAL at 17:40

## 2022-06-17 RX ADMIN — OXYCODONE HYDROCHLORIDE AND ACETAMINOPHEN 500 MG: 500 TABLET ORAL at 05:01

## 2022-06-17 RX ADMIN — LIDOCAINE HYDROCHLORIDE 1 APPLICATION: 40 SOLUTION TOPICAL at 15:04

## 2022-06-17 RX ADMIN — GABAPENTIN 800 MG: 400 CAPSULE ORAL at 10:18

## 2022-06-17 RX ADMIN — POTASSIUM CHLORIDE 20 MEQ: 1500 TABLET, EXTENDED RELEASE ORAL at 05:01

## 2022-06-17 RX ADMIN — ENOXAPARIN SODIUM 40 MG: 40 INJECTION SUBCUTANEOUS at 17:40

## 2022-06-17 RX ADMIN — OXYCODONE HYDROCHLORIDE 10 MG: 10 TABLET ORAL at 13:17

## 2022-06-17 RX ADMIN — OXYCODONE AND ACETAMINOPHEN 1 TABLET: 10; 325 TABLET ORAL at 10:19

## 2022-06-17 RX ADMIN — GABAPENTIN 800 MG: 400 CAPSULE ORAL at 13:18

## 2022-06-17 RX ADMIN — OXYCODONE HYDROCHLORIDE 10 MG: 10 TABLET ORAL at 05:01

## 2022-06-17 RX ADMIN — FERROUS SULFATE TAB 325 MG (65 MG ELEMENTAL FE) 325 MG: 325 (65 FE) TAB at 10:18

## 2022-06-17 RX ADMIN — OXYCODONE AND ACETAMINOPHEN 1 TABLET: 10; 325 TABLET ORAL at 17:40

## 2022-06-17 RX ADMIN — OXYCODONE AND ACETAMINOPHEN 1 TABLET: 10; 325 TABLET ORAL at 13:18

## 2022-06-17 RX ADMIN — OXYCODONE AND ACETAMINOPHEN 1 TABLET: 10; 325 TABLET ORAL at 21:43

## 2022-06-17 ASSESSMENT — ENCOUNTER SYMPTOMS
SORE THROAT: 0
HEARTBURN: 0
SENSORY CHANGE: 0
BLURRED VISION: 0
WEAKNESS: 1
DIARRHEA: 0
HEADACHES: 0
PALPITATIONS: 0
SHORTNESS OF BREATH: 0
FEVER: 0
WHEEZING: 0
DIAPHORESIS: 0
SPEECH CHANGE: 0
VOMITING: 0
MYALGIAS: 0
ABDOMINAL PAIN: 0
FOCAL WEAKNESS: 0
COUGH: 0
DIZZINESS: 0
NAUSEA: 0
CHILLS: 0
NECK PAIN: 0
BACK PAIN: 0
FLANK PAIN: 0

## 2022-06-17 ASSESSMENT — PAIN DESCRIPTION - PAIN TYPE
TYPE: ACUTE PAIN

## 2022-06-17 NOTE — PROGRESS NOTES
Patient moderate fall risk. Educated patient on risk of falls and precautions in place. Patient refused bed alarm. A&Ox4, agrees to call when requesting to get out of bed. Charge RN notified
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
## 2022-06-17 NOTE — CARE PLAN
The patient is Stable - Low risk of patient condition declining or worsening    Shift Goals  Clinical Goals: Pain control  Patient Goals: Pain control  Family Goals: No family present at this time    Progress made toward(s) clinical / shift goals:      Problem: Pain - Standard  Goal: Alleviation of pain or a reduction in pain to the patient’s comfort goal  Outcome: Progressing   Pain medication administered PRN and reassessed frequently.    Problem: Knowledge Deficit - Standard  Goal: Patient and family/care givers will demonstrate understanding of plan of care, disease process/condition, diagnostic tests and medications  Outcome: Progressing  Educated patient on plan of care and medications. All questions addressed.       Patient is not progressing towards the following goals:  N/a

## 2022-06-17 NOTE — DISCHARGE PLANNING
Case Management Discharge Planning    Admission Date: 6/1/2022  GMLOS: 2.9  ALOS: 16    6-Clicks ADL Score: 20  6-Clicks Mobility Score: 17  PT and/or OT Eval ordered: Yes  Post-acute Referrals Ordered: Yes  Post-acute Choice Obtained: Yes  Has referral(s) been sent to post-acute provider:  Yes      Anticipated Discharge Dispo: Discharge Disposition: Discharged to home/self care (01)    DME Needed: No    Action(s) Taken: Per [facility], [facility] received insurance authorization and is able to accept this patient today. [facility] requested a  time of 8:00pm. W send a Volte message to Dr. Carlos requesting the Discharge Summary.    LSW faxed the Transport, [employer], Approved Services Form and Facesheet to [facility]. LSW met with the patient and explained the transfer schedules, patient agreed and signed the [employer] Form. LSW completed the Transfer Packet which was placed in the patient's physical chart drawer, RN aware.     Escalations Completed: None    Medically Clear: Yes    Next Steps: [facility] Line to schedule the transportation to [facility].     Barriers to Discharge: None    Is the patient up for discharge tomorrow: Patient is transferring today to [facility].    3:15pm - Per Maureen with [facility] Line, [facility] transportation to [facility] is scheduled for today at 2000, RN aware.    PLAN: [facility].

## 2022-06-17 NOTE — PROGRESS NOTES
[facility] Medicine Daily Progress Note    Date of Service  6/17/2022    Chief Complaint  Karine Ovalle is a 57 y.o. female admitted 6/1/2022 with facial fractures.    Hospital Course  Admitted with facial fractures, facial laceration after a ground-level fall.  Facial laceration was repaired at the ED.  CT scan showed comminuted fracture of the nasal bridge involving bilateral medial orbital walls, there is a fracture of the lateral right orbital wall and ZMC, there is a fracture of the orbital floor, Fracture of the lateral wall of the right maxillary sinus which is depressed, Fracture of the right maxillary spine.  Oral maxillofacial surgery was consulted on the case, recommendation was conservative management.  Patient also with known history of chronic right lower extremity wound, and chronic lymphedema, it appeared to have probable cellulitis.  Patient was initially started on empiric coverage with IV vancomycin and Zosyn.  Cultures were noted to be negative.  Antibiotics were changed to ciprofloxacin and Bactrim.  Wound care was consulted on the case.    Otherwise, she has been medically stable to be DC to [facility] - pending bed.     Interval Problem Update  Facial fractures -  pain controlled  Right leg wound - pain controlled, wound vac change 2x weekly    At bedside, no acute complain. She expressed concern about having smell again from wound and wondered about possible needs for an Abx. I explained that no systemic signs (fever, tachycardia) and normal WBC - at this point, reasonably to continue with a close observation. I will evaluate the wound with wound care team during vac change.     Discussed with CM: JOSHUA - may have a few discharges today. Regarding alternative facilities, there may be a few that can handle Ver[place]low. However, insurance auth will take awhile or her insurance will not do alternate auth (per CM).    Labs and imagings reviewed.     I have personally seen and examined the patient at  bedside. I discussed the plan of care with patient, bedside RN, charge RN and .    Consultants/Specialty  OMF surgery, LPS    Code Status  Full Code    Disposition  Patient is medically cleared for discharge.   Anticipate discharge to to a long-term acute care hospital.  I have placed the appropriate orders for post-discharge needs.    Review of Systems  Review of Systems   Constitutional: Negative for chills, diaphoresis, fever and malaise/fatigue.   HENT: Negative for congestion, hearing loss and sore throat.    Eyes: Negative for blurred vision.   Respiratory: Negative for cough, shortness of breath and wheezing.    Cardiovascular: Positive for leg swelling. Negative for chest pain and palpitations.   Gastrointestinal: Negative for abdominal pain, diarrhea, heartburn, nausea and vomiting.   Genitourinary: Negative for dysuria, flank pain and hematuria.   Musculoskeletal: Negative for back pain, joint pain, myalgias and neck pain.   Skin: Negative for rash.   Neurological: Positive for weakness. Negative for dizziness, sensory change, speech change, focal weakness and headaches.        Physical Exam  Temp:  [36.3 °C (97.3 °F)-37.4 °C (99.3 °F)] 36.3 °C (97.3 °F)  Pulse:  [92-98] 92  Resp:  [17-18] 18  BP: (103-110)/(56-65) 110/56  SpO2:  [91 %-98 %] 97 %    Physical Exam  Vitals and nursing note reviewed.   Constitutional:       Appearance: She is obese.   HENT:      Head: Normocephalic.      Comments: Lacerations at right cheek with stitches     Nose: No congestion.      Mouth/Throat:      Mouth: Mucous membranes are moist.   Eyes:      Extraocular Movements: Extraocular movements intact.      Conjunctiva/sclera: Conjunctivae normal.   Cardiovascular:      Rate and Rhythm: Normal rate and regular rhythm.   Pulmonary:      Effort: Pulmonary effort is normal.      Breath sounds: Normal breath sounds.   Abdominal:      General: There is no distension.      Tenderness: There is no abdominal tenderness.  There is no guarding or rebound.   Musculoskeletal:      Cervical back: No tenderness.      Right lower leg: Edema present.      Left lower leg: Edema present.      Comments: Wound VAC right leg   Skin:     General: Skin is warm and dry.   Neurological:      General: No focal deficit present.      Mental Status: She is alert and oriented to person, place, and time.      Cranial Nerves: No cranial nerve deficit.         Fluids    Intake/Output Summary (Last 24 hours) at 6/17/2022 1201  Last data filed at 6/17/2022 0742  Gross per 24 hour   Intake 540 ml   Output 2350 ml   Net -1810 ml       Laboratory  Recent Labs     06/15/22  0442 06/16/22  0751 06/17/22  0317   WBC 8.0 7.6 8.2   RBC 3.30* 3.26* 3.50*   HEMOGLOBIN 9.4* 9.3* 10.0*   HEMATOCRIT 27.9* 27.1* 29.8*   MCV 84.5 83.1 85.1   MCH 28.5 28.5 28.6   MCHC 33.7 34.3 33.6   RDW 62.3* 59.7* 62.4*   PLATELETCT 467* 460* 400   MPV 9.2 9.2 8.9*     Recent Labs     06/15/22  0442 06/16/22  0221 06/17/22  0328   SODIUM 136 137 140   POTASSIUM 4.0 4.7 4.2   CHLORIDE 102 103 104   CO2 26 25 27   GLUCOSE 108* 80 85   BUN 9 9 8   CREATININE 0.50 0.54 0.48*   CALCIUM 8.6 8.9 8.7                   Imaging  EC-ECHOCARDIOGRAM COMPLETE W/O CONT   Final Result      US-EXTREMITY VENOUS LOWER BILAT   Final Result      CT-HEAD W/O   Final Result      1.  Multiple facial fractures as above.   2.  No acute intracranial hemorrhage or mass effect.         CT-MAXILLOFACIAL W/O PLUS RECONS   Final Result      1.  There is a comminuted fracture of the nasal bridge involving bilateral medial orbital walls.   2.  There is a fracture of the lateral right orbital wall and ZMC.   3.  There is a fracture of the orbital floor   4.  Fracture of the lateral wall of the right maxillary sinus which is depressed.   5.  Fracture of the right maxillary spine.   6.  There is thickening of the right lateral rectus muscle. No posterior orbital hematoma. There are a few foci of postorbital gas. There is  periorbital swelling on the right.   7.  There is no fracture of the pterygoids. No mandibular fracture.   8.  There is odontogenic disease.      DX-TIBIA AND FIBULA RIGHT   Final Result      1.  Large soft tissue defect along the anterior, proximal shin with no radiographic findings to suggest osteomyelitis.   2.  Diffuse, decreased bone mineral density.           Assessment/Plan  * Multiple closed fractures of facial bone (HCC)- (present on admission)  Assessment & Plan  Pain control    Hypomagnesemia- (present on admission)  Assessment & Plan  oral Mg  Follow level    Serum gamma globulin increased- (present on admission)  Assessment & Plan  possibly secondary to cellulitis  Follow up as outpatient    Facial laceration- (present on admission)  Assessment & Plan  Wound care    Fall- (present on admission)  Assessment & Plan  PT and OT      Anemia, chronic disease- (present on admission)  Assessment & Plan  Follow cbc    Near syncope- (present on admission)  Assessment & Plan  PT and OT      Asthma- (present on admission)  Assessment & Plan  Flovent, RT protocol    Chronic pain syndrome- (present on admission)  Assessment & Plan  MS Contin, Percocet, Gabapentin    Open wound of right lower extremity- (present on admission)  Assessment & Plan  Wound care    Cellulitis of right lower extremity- (present on admission)  Assessment & Plan  finished course of Ciprofloxacin, Bactrim    Hypokalemia  Assessment & Plan  Kdur, follow bmp    Lymphedema- (present on admission)  Assessment & Plan  Lasix    Obesity (BMI 30-39.9)- (present on admission)  Assessment & Plan  Body mass index is 34.05 kg/m².    Hyponatremia- (present on admission)  Assessment & Plan  Follow bmp    History of gastric bypass- (present on admission)  Assessment & Plan  Nutrition consult         VTE prophylaxis: enoxaparin ppx    I have performed a physical exam and reviewed and updated ROS and Plan today (6/17/2022).

## 2022-06-17 NOTE — DISCHARGE PLANNING
Agency/Facility Name: [facility]  Spoke To: Erin   Outcome: Per Erin, they may have 2 beds available today however need to verify. Erin will call DPA back with any updates.     @1414-  Agency/Facility Name: [facility]   Spoke To: Erin   Outcome: Per Erin the anticipated discharge for today has fallen through. The next expected discharge wont be til the weekend.    @1422-  Agency/Facility Name: [facility]   Spoke To: Erin   Outcome: Per Erin there is a bed available today. She said to expect a transport time of [phone]. Erin will call back with a specific time. GREMAN Mendez notified.     @1434-  Agency/Facility Name: [facility]   Spoke To: Erin   Outcome: Per Erin they need transport scheduled for 2000. In addition they would like the [employer] wound vac removed prior to pt's departure.
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
## 2022-06-17 NOTE — DISCHARGE PLANNING
DC Transport Scheduled    Received request at: [id number]    Transport Company Scheduled:  [employer]  Spoke with Jazlyn at [place]  to schedule transport.      Scheduled Date: 06/17/2022  Scheduled Time: 2000    Destination: [facility]     Notified care team of scheduled transport via [employer].     If there are any changes needed to the DC transportation scheduled, please contact [facility] at ext. [phone] between the hours of 0837-0888 Mon-Fri. If outside those hours, contact the ED Case Manager at ext. [phone].

## 2022-06-17 NOTE — PROGRESS NOTES
Assumed care of patient at 0645. Bedside report received. Assessment complete.    AA&Ox4. Denies SOB.    Reporting 9/10 pain. Medications given for pain control as charted in MAR.    Educated patient regarding pharmacologic and non pharmacologic modalities for pain management.    Skin per flow sheets.    Tolerating regular diet. Denies N/V.    + void. Last BM 6/16/2022.    Pt up x 1 assist w/ FWW.    Plan of care discussed, all questions answered. Educated on the importance of calling before getting OOB and pt verbalizes understanding. Educated regarding importance of oral care. Oral care kit at bedside. Call light is within reach, treaded slipper socks on, bed in lowest/ locked position, hourly rounding in place, all needs met at this time.    Suzan Newman R.N.

## 2022-06-18 NOTE — DISCHARGE INSTRUCTIONS
Discharge Instructions    Discharged to other by ambulance with self. Discharged via ambulance, hospital escort: Yes.  Special equipment needed: Not Applicable    Be sure to schedule a follow-up appointment with your primary care doctor or any specialists as instructed.     Discharge Plan:        I understand that a diet low in cholesterol, fat, and sodium is recommended for good health. Unless I have been given specific instructions below for another diet, I accept this instruction as my diet prescription.   Other diet: N/A    Special Instructions: None    Is patient discharged on Warfarin / Coumadin?   No     Depression / Suicide Risk    As you are discharged from this [facility] facility, it is important to learn how to keep safe from harming yourself.    Recognize the warning signs:  Abrupt changes in personality, positive or negative- including increase in energy   Giving away possessions  Change in eating patterns- significant weight changes-  positive or negative  Change in sleeping patterns- unable to sleep or sleeping all the time   Unwillingness or inability to communicate  Depression  Unusual sadness, discouragement and loneliness  Talk of wanting to die  Neglect of personal appearance   Rebelliousness- reckless behavior  Withdrawal from people/activities they love  Confusion- inability to concentrate     If you or a loved one observes any of these behaviors or has concerns about self-harm, here's what you can do:  Talk about it- your feelings and reasons for harming yourself  Remove any means that you might use to hurt yourself (examples: pills, rope, extension cords, firearm)  Get professional help from the community (Mental Health, Substance Abuse, psychological counseling)  Do not be alone:Call your Safe Contact- someone whom you trust who will be there for you.  Call your local CRISIS HOTLINE 561-5991 or 417-688-5167  Call your local Children's Mobile Crisis Response Team [facility] [phone]  [phone] or [email or web address].My Pick Box  Call the toll free National Suicide Prevention Hotlines   National Suicide Prevention Lifeline 363-149-JDUK (5355)  National Bench[employer] Line Network 800-SUICIDE (561-8113)

## 2022-06-18 NOTE — PROGRESS NOTES
Pt transported with SALOMÓN to [facility]. Discharge education provided to pt, all questions answered. All belongings accounted for. IV removed. Wound vac tubing clamped and disconnected from machine. All paperwork given to SALOMÓN. Report given to RN at [facility].

## 2022-06-18 NOTE — DISCHARGE PLANNING
Note:  Received call from Erin, Liaison from [facility] requesting for transportation to be moved back to 2130 tonight. RN JB called [facility]. Per Liana from [facility], they have 2200 available. Transportation moved to 2200. RN JB informed Erin from [facility], agreeable with new transport time. RN CM called bedside RN, Suzan, with updated [facility]  time.

## 2022-06-18 NOTE — PROGRESS NOTES
Bedside report received.  Assessment complete.  A&O x 4. Patient calls appropriately.  Patient ambulates with x1 assist and FWW.   Patient has 9/10 pain. Pain managed with prescribed medications.  Denies N&V. Tolerating regular diet.  RLE with 2 wound vacs, no leaks, CDI. R facial lac, CDI.  + void to pérez, + flatus, - BM.  Patient denies SOB.  Review plan with of care with patient. Call light and personal belongings within reach. Hourly rounding in place. All needs met at this time.

## 2022-08-25 ENCOUNTER — HOME HEALTH ADMISSION (OUTPATIENT)
Dept: HOME HEALTH SERVICES | Facility: HOME HEALTHCARE | Age: 57
End: 2022-08-25
Payer: COMMERCIAL

## 2022-08-29 ENCOUNTER — OFFICE VISIT (OUTPATIENT)
Dept: MEDICAL GROUP | Age: 57
End: 2022-08-29
Payer: COMMERCIAL

## 2022-08-29 ENCOUNTER — HOME CARE VISIT (OUTPATIENT)
Dept: HOME HEALTH SERVICES | Facility: HOME HEALTHCARE | Age: 57
End: 2022-08-29
Payer: COMMERCIAL

## 2022-08-29 VITALS
HEIGHT: 63 IN | SYSTOLIC BLOOD PRESSURE: 122 MMHG | RESPIRATION RATE: 16 BRPM | DIASTOLIC BLOOD PRESSURE: 64 MMHG | BODY MASS INDEX: 33.83 KG/M2 | HEART RATE: 80 BPM | OXYGEN SATURATION: 100 % | TEMPERATURE: 97.4 F

## 2022-08-29 VITALS
SYSTOLIC BLOOD PRESSURE: 120 MMHG | DIASTOLIC BLOOD PRESSURE: 76 MMHG | WEIGHT: 191 LBS | HEART RATE: 98 BPM | OXYGEN SATURATION: 95 % | BODY MASS INDEX: 33.84 KG/M2 | HEIGHT: 63 IN | RESPIRATION RATE: 16 BRPM | TEMPERATURE: 97.5 F

## 2022-08-29 DIAGNOSIS — J45.21 MILD INTERMITTENT ASTHMA WITH ACUTE EXACERBATION: ICD-10-CM

## 2022-08-29 DIAGNOSIS — Z00.8 ENCOUNTER FOR WORK CAPABILITY ASSESSMENT: ICD-10-CM

## 2022-08-29 DIAGNOSIS — Z12.11 SCREENING FOR COLORECTAL CANCER: ICD-10-CM

## 2022-08-29 DIAGNOSIS — Z11.59 NEED FOR HEPATITIS C SCREENING TEST: ICD-10-CM

## 2022-08-29 DIAGNOSIS — Z12.12 SCREENING FOR COLORECTAL CANCER: ICD-10-CM

## 2022-08-29 DIAGNOSIS — Z09 HOSPITAL DISCHARGE FOLLOW-UP: ICD-10-CM

## 2022-08-29 DIAGNOSIS — Z23 NEED FOR VACCINATION: ICD-10-CM

## 2022-08-29 DIAGNOSIS — S81.801A OPEN WOUND OF RIGHT LOWER EXTREMITY, INITIAL ENCOUNTER: ICD-10-CM

## 2022-08-29 DIAGNOSIS — Z00.00 HEALTHCARE MAINTENANCE: ICD-10-CM

## 2022-08-29 DIAGNOSIS — Z12.31 ENCOUNTER FOR SCREENING MAMMOGRAM FOR BREAST CANCER: ICD-10-CM

## 2022-08-29 PROCEDURE — G0493 RN CARE EA 15 MIN HH/HOSPICE: HCPCS

## 2022-08-29 PROCEDURE — 99214 OFFICE O/P EST MOD 30 MIN: CPT | Performed by: INTERNAL MEDICINE

## 2022-08-29 PROCEDURE — 665001 SOC-HOME HEALTH

## 2022-08-29 RX ORDER — GABAPENTIN 800 MG/1
800 TABLET ORAL 2 TIMES DAILY
Qty: 360 TABLET | Refills: 3 | Status: SHIPPED | OUTPATIENT
Start: 2022-08-29 | End: 2022-09-22 | Stop reason: SDUPTHER

## 2022-08-29 RX ORDER — ASCORBIC ACID 500 MG
500 TABLET ORAL 2 TIMES DAILY
Qty: 100 TABLET | Refills: 4 | Status: SHIPPED | OUTPATIENT
Start: 2022-08-29

## 2022-08-29 RX ORDER — FLUTICASONE PROPIONATE 44 UG/1
2 AEROSOL, METERED RESPIRATORY (INHALATION) 2 TIMES DAILY PRN
Qty: 1 EACH | Refills: 11 | Status: SHIPPED | OUTPATIENT
Start: 2022-08-29 | End: 2023-07-13 | Stop reason: SDUPTHER

## 2022-08-29 ASSESSMENT — ENCOUNTER SYMPTOMS
SUBJECTIVE PAIN PROGRESSION: UNCHANGED
DYSPNEA ACTIVITY LEVEL: AFTER AMBULATING MORE THAN 20 FT
PAIN LOCATION: LEFT KNEE
MYALGIAS: 1
HIGHEST PAIN SEVERITY IN PAST 24 HOURS: 9/10
DEBILITATING PAIN: 1
SHORTNESS OF BREATH: 1
PERSON REPORTING PAIN: PATIENT
FATIGUE: 1
PAIN LOCATION - EXACERBATING FACTORS: WALKING
PAIN: 1
PAIN LOCATION - PAIN FREQUENCY: CONSTANT
PAIN SEVERITY GOAL: 6/10
LOWEST PAIN SEVERITY IN PAST 24 HOURS: 6/10
PAIN LOCATION - PAIN SEVERITY: 6/10

## 2022-08-29 ASSESSMENT — PATIENT HEALTH QUESTIONNAIRE - PHQ9
CLINICAL INTERPRETATION OF PHQ2 SCORE: 0
2. FEELING DOWN, DEPRESSED, IRRITABLE, OR HOPELESS: 00
1. LITTLE INTEREST OR PLEASURE IN DOING THINGS: 00

## 2022-08-29 ASSESSMENT — FIBROSIS 4 INDEX: FIB4 SCORE: 0.86

## 2022-08-30 ENCOUNTER — HOME CARE VISIT (OUTPATIENT)
Dept: HOME HEALTH SERVICES | Facility: HOME HEALTHCARE | Age: 57
End: 2022-08-30
Payer: COMMERCIAL

## 2022-08-30 VITALS
DIASTOLIC BLOOD PRESSURE: 70 MMHG | SYSTOLIC BLOOD PRESSURE: 115 MMHG | TEMPERATURE: 98.2 F | OXYGEN SATURATION: 95 % | HEART RATE: 98 BPM | RESPIRATION RATE: 18 BRPM

## 2022-08-30 PROCEDURE — G0151 HHCP-SERV OF PT,EA 15 MIN: HCPCS

## 2022-08-30 PROCEDURE — G0299 HHS/HOSPICE OF RN EA 15 MIN: HCPCS

## 2022-08-30 ASSESSMENT — BALANCE ASSESSMENTS
NUDGED: 1 - STAGGERS, GRABS, CATCHES SELF
STANDING BALANCE: 1 - STEADY BUT WIDE STANCE AND USES CANE OR OTHER SUPPORT
ATTEMPTS TO ARISE: 1 - ABLE, REQUIRES MORE THAN ONE ATTEMPT
BALANCE SCORE: 8
ARISES: 1 - ABLE, USES ARMS TO HELP
IMMEDIATE STANDING BALANCE FIRST 5 SECONDS: 1 - STEADY BUT USES WALKER OR OTHER SUPPORT
NUDGED SCORE: 1
SITTING DOWN: 1 - USES ARMS OR NOT SMOOTH MOTION
ARISING SCORE: 1
EYES CLOSED AT MAXIMUM POSITION NUDGED: 0 - UNSTEADY
SITTING BALANCE: 1 - STEADY, SAFE
TURNING 360 DEGREES STEPS: 0 - DISCONTINUOUS STEPS

## 2022-08-30 ASSESSMENT — GAIT ASSESSMENTS
BALANCE AND GAIT SCORE: 14
WALKING STANCE: 0 - HEELS APART
PATH: 1 - MILD/MODERATE DEVIATION OR USES WALKING AID
INITIATION OF GAIT IMMEDIATELY AFTER GO: 1 - NO HESITANCY
STEP CONTINUITY: 0 - STOPPING OR DISCONTINUITY BETWEEN STEPS
GAIT SCORE: 6
TRUNK SCORE: 0
STEP SYMMETRY: 1 - RIGHT AND LEFT STEP LENGTH APPEAR EQUAL
TRUNK: 0 - MARKED SWAY OR USES WALKING AID
PATH SCORE: 1

## 2022-08-30 ASSESSMENT — ENCOUNTER SYMPTOMS
PAIN LOCATION - RELIEVING FACTORS: PAIN MEDS
LOWEST PAIN SEVERITY IN PAST 24 HOURS: 4/10
HIGHEST PAIN SEVERITY IN PAST 24 HOURS: 8/10
PAIN: 1
SUBJECTIVE PAIN PROGRESSION: WAXING AND WANING
PAIN LOCATION: RIGHT LEG
PAIN SEVERITY GOAL: 1/10
PERSON REPORTING PAIN: PATIENT
PAIN LOCATION - PAIN SEVERITY: 6/10
PAIN LOCATION - PAIN FREQUENCY: FREQUENT

## 2022-08-30 NOTE — PROGRESS NOTES
Subjective     Karine Ovalle is a 57 y.o. female who presents with [facility] Follow-up (Medication management )  The patient is here for followup of chronic medical problems listed below. The patient is compliant with medications and having no side effects from them. Denies chest pain, abdominal pain, dyspnea, myalgias, or cough.   Patient Active Problem List    Diagnosis Date Noted    Hypomagnesemia 06/11/2022    Facial laceration 06/08/2022    Serum gamma globulin increased 06/08/2022    Anemia, chronic disease 06/02/2022    Fall 06/02/2022    Multiple closed fractures of facial bone (HCC) 06/01/2022    Chronic pain syndrome 06/01/2022    Asthma 06/01/2022    Near syncope 06/01/2022    Open wound of right lower extremity 04/04/2022    Sepsis (Former[facility]) 04/03/2022    Leukocytosis 04/03/2022    Cellulitis of right lower extremity 12/31/2021    Leg edema 08/09/2021    Administrative [id number] FMLA paperwork for workplace accommodation for chronic DDD and DJD hips and shoulders bilaterally status post multiple joint replacements 06/23/2021    Primary osteoarthritis of both knees- sp right TKR 2015; left TKR tbd 2021 or 2022- dr nowak 06/04/2019    S/P hip replacement, bilateral 02/13/2019    Primary insomnia 01/03/2019    Mixed stress and urge urinary incontinence 01/03/2019    Edema 12/13/2018    Vitamin D deficiency 12/13/2018    Microcytic anemia- postop THR 2/2019 12/13/2018    Cervical myelopathy (HCC) 12/11/2018    Left hip pain 09/18/2018    DDD (degenerative disc disease), cervical- MOD-SEVERE SPINE NV- dr brennan; fusion and laminectomy C3-T1 12/5/2018 dr brennan 08/09/2018    Primary osteoarthritis of both hips- dr nowak; left THR done feb 6, 2019; right THR 3/2018 06/07/2018    Morbid obesity with BMI of 40.0-44.9, adult (Formerly [facility]) 06/07/2018    Hypokalemia 06/07/2018    DDD (degenerative disc disease), lumbar 04/25/2018    Uncomplicated opioid dependence (CMS-Formerly [facility])- spine nv  01/31/2018    Essential hypertension 01/31/2018    Lymphedema 10/17/2017    Venous insufficiency 10/03/2017    Chronic bilateral low back pain with bilateral sciatica- pain mgt;    spine nv 07/26/2017    Obesity (BMI 30-39.9) 12/02/2016    Vitamin B 12 deficiency 06/02/2016    Hyponatremia 05/18/2012    History of gastric bypass 04/25/2012    Mild intermittent asthma with acute exacerbation 04/25/2012     Allergies   Allergen Reactions    Tobacco [Nicotiana Tabacum] Shortness of Breath     Cigarette smoke causes SOB, rispatory issues     Outpatient Medications Prior to Visit   Medication Sig Dispense Refill    magnesium oxide 400 (240 Mg) MG Tab Take 1 Tablet by mouth 2 times a day. 30 Tablet     ascorbic acid (VITAMIN C) 500 MG tablet Take 1 Tablet by mouth every day. (Patient taking differently: Take 1 Tablet by mouth 2 times a day.) 30 Tablet     celecoxib (CELEBREX) 200 MG Cap Take 1 Capsule by mouth 2 times a day as needed for Mild Pain. (Patient not taking: Reported on 8/29/2022) 60 Capsule     ondansetron (ZOFRAN ODT) 4 MG TABLET DISPERSIBLE Take 1 Tablet by mouth every four hours as needed for Nausea. (Patient not taking: Reported on 8/29/2022) 10 Tablet 0    gabapentin (NEURONTIN) 800 MG tablet Take 1 Tablet by mouth 4 times a day. (Patient taking differently: Take 800 mg by mouth 2 times a day.) 360 Tablet 3    methocarbamol (ROBAXIN) 500 MG Tab Take 2 Tablets by mouth 4 times a day as needed (back pain and spasms). Indications: Musculoskeletal Pain (Patient not taking: Reported on 8/29/2022) 240 Tablet 5    furosemide (LASIX) 40 MG Tab Take 1 Tablet by mouth every day. Indications: Edema 90 Tablet 4    potassium chloride SA (KDUR) 20 MEQ Tab CR Take 1 Tablet by mouth every day. (Patient not taking: Reported on 8/29/2022) 90 Tablet 4    fluticasone (FLOVENT HFA) 44 MCG/ACT Aerosol Inhale 2 Puffs 2 times a day. Everyday maintenance steroid inhaler (Patient taking differently: Inhale 2 Puffs 2 times a day as  needed (tobacco smoke). Everyday maintenance steroid inhaler) 1 Each 11    VITAMIN D PO Take 1 Capsule by mouth every day.      morphine ER (MS CONTIN) 15 MG Tab CR tablet Take 15 mg by mouth every 12 hours.      oxyCODONE-acetaminophen (PERCOCET-10)  MG Tab Take 1 Tablet by mouth every 6 hours as needed for Moderate Pain or Severe Pain.  0    CALCIUM CARBONATE-VITAMIN D PO Take 1 Tablet by mouth every day. Indications: Low Amount of Calcium in the Blood      Multiple Vitamins-Minerals (ONE-A-DAY WOMENS 50 PLUS PO) Take 1 tablet by mouth every day.      ferrous sulfate 325 (65 Fe) MG tablet Take 1 Tab by mouth every morning with breakfast. 30 Tab 1     No facility-administered medications prior to visit.     No visits with results within 1 Month(s) from this visit.   Latest known visit with results is:   No results displayed because visit has over 200 results.         Lab Results   Component Value Date/Time    HBA1C 4.7 06/02/2022 02:46 AM    HBA1C 5.1 07/26/2021 11:14 AM     Lab Results   Component Value Date/Time    SODIUM 140 06/17/2022 03:28 AM    POTASSIUM 4.2 06/17/2022 03:28 AM    CHLORIDE 104 06/17/2022 03:28 AM    CO2 27 06/17/2022 03:28 AM    GLUCOSE 85 06/17/2022 03:28 AM    BUN 8 06/17/2022 03:28 AM    CREATININE 0.48 (L) 06/17/2022 03:28 AM    CREATININE 0.88 08/14/2010 12:00 AM    BUNCREATRAT 13 08/14/2010 12:00 AM    GLOMRATE >59 08/14/2010 12:00 AM    ALKPHOSPHAT 152 (H) 06/02/2022 02:46 AM    ASTSGOT 16 06/02/2022 02:46 AM    ALTSGPT 7 06/02/2022 02:46 AM    TBILIRUBIN 0.3 06/02/2022 02:46 AM     Lab Results   Component Value Date/Time    INR 1.25 (H) 06/01/2022 03:30 PM    INR 1.00 02/13/2019 11:44 AM    INR 1.01 12/04/2018 11:15 AM     Lab Results   Component Value Date/Time    CHOLSTRLTOT 147 07/26/2021 11:14 AM    LDL 63 07/26/2021 11:14 AM    HDL 75 07/26/2021 11:14 AM    TRIGLYCERIDE 44 07/26/2021 11:14 AM       No results found for: TESTOSTERONE  Lab Results   Component Value Date/Time  "   TSH 2.110 08/14/2010 12:00 AM     Lab Results   Component Value Date/Time    FREET4 0.72 05/03/2016 07:32 AM     Lab Results   Component Value Date/Time    URICACID 5.6 08/12/2014 04:49 PM     No components found for: VITB12  Lab Results   Component Value Date/Time    25HYDROXY 44 06/02/2022 02:42 PM    25HYDROXY 50 07/26/2021 11:14 AM   '          HPI    Review of Systems   Musculoskeletal:  Positive for joint pain and myalgias.            Objective     /76 (BP Location: Right arm, Patient Position: Sitting, BP Cuff Size: Large adult)   Pulse 98   Temp 36.4 °C (97.5 °F) (Temporal)   Resp 16   Ht 1.6 m (5' 3\")   Wt 86.6 kg (191 lb)   LMP  (LMP Unknown)   SpO2 95%   BMI 33.83 kg/m²      Physical Exam  Vitals reviewed.   Constitutional:       General: She is not in acute distress.     Appearance: She is well-developed. She is not diaphoretic.   HENT:      Head: Normocephalic and atraumatic.      Right Ear: External ear normal.      Left Ear: External ear normal.      Nose: Nose normal.      Mouth/Throat:      Pharynx: No oropharyngeal exudate.   Eyes:      General: No scleral icterus.        Right eye: No discharge.         Left eye: No discharge.      Conjunctiva/sclera: Conjunctivae normal.      Pupils: Pupils are equal, round, and reactive to light.   Neck:      Thyroid: No thyromegaly.      Vascular: No JVD.      Trachea: No tracheal deviation.   Cardiovascular:      Rate and Rhythm: Normal rate and regular rhythm.      Heart sounds: Normal heart sounds. No murmur heard.    No friction rub. No gallop.   Pulmonary:      Effort: Pulmonary effort is normal. No respiratory distress.      Breath sounds: Normal breath sounds. No stridor. No wheezing or rales.   Chest:      Chest wall: No tenderness.   Abdominal:      General: Bowel sounds are normal. There is no distension.      Palpations: Abdomen is soft. There is no mass.      Tenderness: There is no abdominal tenderness. There is no guarding or " rebound.   Musculoskeletal:         General: No tenderness. Normal range of motion.      Cervical back: Normal range of motion and neck supple.   Lymphadenopathy:      Cervical: No cervical adenopathy.   Skin:     General: Skin is warm and dry.      Coloration: Skin is not pale.      Findings: Erythema, lesion and rash present.   Neurological:      Mental Status: She is alert and oriented to person, place, and time.      Cranial Nerves: No cranial nerve deficit.      Motor: No abnormal muscle tone.      Coordination: Coordination normal.      Deep Tendon Reflexes: Reflexes are normal and symmetric. Reflexes normal.   Psychiatric:         Behavior: Behavior normal.         Thought Content: Thought content normal.         Judgment: Judgment normal.                           Assessment & Plan        1. Hospital discharge follow-up- [facility] d/c 8/27/22, after 2 mos stay  Patient doing relatively poorly with continued chronic dehiscing wound and needs continued wound care through the wound clinic.  Patient has been referred and will follow up with them.  She currently continues to have a wound VAC in place which has been present for the last 6 months although not always operating optimally.  Follow-up with surgeons for any further debridement may be necessary.    2. Open wound of right lower extremity, initial encounter      As above.    3.  Work capacity/capability evaluation.  Patient will bring in her paperwork for FMLA documentation in 2 weeks to further document her time off from work.        4.  Health maintenance.  Refer to GYN for GYN cancer screening and to GI for colon cancer screening

## 2022-08-31 ENCOUNTER — HOME CARE VISIT (OUTPATIENT)
Dept: HOME HEALTH SERVICES | Facility: HOME HEALTHCARE | Age: 57
End: 2022-08-31
Payer: COMMERCIAL

## 2022-08-31 ENCOUNTER — DOCUMENTATION (OUTPATIENT)
Dept: VASCULAR LAB | Facility: MEDICAL CENTER | Age: 57
End: 2022-08-31
Payer: COMMERCIAL

## 2022-08-31 VITALS
SYSTOLIC BLOOD PRESSURE: 118 MMHG | HEART RATE: 89 BPM | TEMPERATURE: 97.9 F | DIASTOLIC BLOOD PRESSURE: 60 MMHG | RESPIRATION RATE: 17 BRPM | OXYGEN SATURATION: 93 %

## 2022-08-31 VITALS
HEART RATE: 98 BPM | SYSTOLIC BLOOD PRESSURE: 115 MMHG | TEMPERATURE: 98.2 F | RESPIRATION RATE: 18 BRPM | DIASTOLIC BLOOD PRESSURE: 70 MMHG | OXYGEN SATURATION: 95 %

## 2022-08-31 VITALS
HEART RATE: 89 BPM | TEMPERATURE: 97.9 F | DIASTOLIC BLOOD PRESSURE: 60 MMHG | RESPIRATION RATE: 17 BRPM | OXYGEN SATURATION: 93 % | SYSTOLIC BLOOD PRESSURE: 118 MMHG

## 2022-08-31 PROCEDURE — A6214 FOAM DRG > 48 SQ IN W/BORDER: HCPCS

## 2022-08-31 PROCEDURE — G0157 HHC PT ASSISTANT EA 15: HCPCS | Mod: CQ

## 2022-08-31 PROCEDURE — G0299 HHS/HOSPICE OF RN EA 15 MIN: HCPCS

## 2022-08-31 PROCEDURE — 6650300 HCR  CLEANSER 4-IN-1

## 2022-08-31 PROCEDURE — A6210 FOAM DRG >16<=48 SQ IN W/O B: HCPCS

## 2022-08-31 SDOH — ECONOMIC STABILITY: HOUSING INSECURITY
HOME SAFETY: PT LIVES WITH BOYFRIEND/SO, THE APARTMENT IS VERY CLUTTERED AND UNKEPT, 3 DOGS THAT SHE WILL TRY TO PUT AWAY, PT HAS A DIFFICULT TIME GETTING AROUND TO GET THEM ISOLATED INTO ONE ROOM. PT DOES HAVE CLEAR PATHWAYS TO WALK THROUGH THE HOME

## 2022-08-31 ASSESSMENT — ENCOUNTER SYMPTOMS
PAIN LOCATION - PAIN QUALITY: SHARP
PAIN: 1
PAIN LOCATION: LEFT KNEE
PAIN LOCATION - RELIEVING FACTORS: RESTING
LIMITED RANGE OF MOTION: 1
NAUSEA: DENIES
SUBJECTIVE PAIN PROGRESSION: UNCHANGED
PAIN LOCATION - EXACERBATING FACTORS: MOVEMENT
LIMITED RANGE OF MOTION: 1
NAUSEA: NO
LOWEST PAIN SEVERITY IN PAST 24 HOURS: 5/10
HIGHEST PAIN SEVERITY IN PAST 24 HOURS: 7/10
PERSON REPORTING PAIN: PATIENT
PAIN SEVERITY GOAL: 3/10
PAIN LOCATION - PAIN DURATION: DAILY
PAIN SEVERITY GOAL: 0/10
HIGHEST PAIN SEVERITY IN PAST 24 HOURS: 10/10
LOWEST PAIN SEVERITY IN PAST 24 HOURS: 6/10
MUSCLE WEAKNESS: 1
PAIN LOCATION - PAIN SEVERITY: 9/10
PAIN LOCATION - EXACERBATING FACTORS: WALKING
VOMITING: DENIES
DEBILITATING PAIN: 1
VOMITING: NO
PAIN LOCATION - PAIN FREQUENCY: CONSTANT
PAIN LOCATION: RT LEG
PAIN LOCATION - PAIN SEVERITY: 6/10
SUBJECTIVE PAIN PROGRESSION: WAXING AND WANING
PAIN: 1
LIMITED RANGE OF MOTION: 1
PERSON REPORTING PAIN: PATIENT
PAIN: 1
MUSCLE WEAKNESS: 1
PAIN LOCATION - PAIN FREQUENCY: CONSTANT
MUSCLE WEAKNESS: 1
ARTHRALGIAS: 1

## 2022-08-31 ASSESSMENT — ACTIVITIES OF DAILY LIVING (ADL)
AMBULATION ASSISTANCE: STAND BY ASSIST
FEEDING_WITHIN_DEFINED_LIMITS: 1
GROOMING_WITHIN_DEFINED_LIMITS: 1
PHYSICAL TRANSFERS ASSESSED: 1
AMBULATION ASSISTANCE: 1
AMBULATION ASSISTANCE ON FLAT SURFACES: 1
CURRENT_FUNCTION: SUPERVISION
CURRENT_FUNCTION: STAND BY ASSIST
CURRENT_FUNCTION: STAND BY ASSIST
AMBULATION ASSISTANCE: CONTACT GUARD ASSIST
AMBULATION_DISTANCE/DURATION_TOLERATED: 20FT X2
AMBULATION ASSISTANCE: STAND BY ASSIST

## 2022-08-31 NOTE — PROGRESS NOTES
[facility]    Received referral from [facility] to review patient's medication list.    Med list reviewed and reconciled.  No clinically significant DDI identified.  Allergies reviewed.      Chantal Antunez, RashawnD

## 2022-09-01 ENCOUNTER — HOME CARE VISIT (OUTPATIENT)
Dept: HOME HEALTH SERVICES | Facility: HOME HEALTHCARE | Age: 57
End: 2022-09-01
Payer: COMMERCIAL

## 2022-09-01 NOTE — CASE COMMUNICATION
On arrival this therapist had to sit and wait x 18 min while she dressed as she forgot she had appt She is easily distaracted and currently worried ablout her wound vac. Notified nursing who are aware of wound vac needs and they are working with [facility]. Karine needs constant rerouting to stay on task. Went over HEP and educated her on pacing herself and not over doing it. She has MD appt tomorrow. Will cont with POC to increase her function al mob and ind to prevent further decline in function S/B Norah Raya PT

## 2022-09-02 ENCOUNTER — HOME CARE VISIT (OUTPATIENT)
Dept: HOME HEALTH SERVICES | Facility: HOME HEALTHCARE | Age: 57
End: 2022-09-02
Payer: COMMERCIAL

## 2022-09-02 ENCOUNTER — TELEPHONE (OUTPATIENT)
Dept: WOUND CARE | Facility: MEDICAL CENTER | Age: 57
End: 2022-09-02
Payer: COMMERCIAL

## 2022-09-02 ENCOUNTER — TELEPHONE (OUTPATIENT)
Dept: MEDICAL GROUP | Age: 57
End: 2022-09-02
Payer: COMMERCIAL

## 2022-09-02 VITALS
RESPIRATION RATE: 16 BRPM | OXYGEN SATURATION: 96 % | DIASTOLIC BLOOD PRESSURE: 60 MMHG | HEART RATE: 78 BPM | TEMPERATURE: 99.2 F | SYSTOLIC BLOOD PRESSURE: 90 MMHG

## 2022-09-02 DIAGNOSIS — S81.801A OPEN WOUND OF RIGHT LOWER EXTREMITY, INITIAL ENCOUNTER: ICD-10-CM

## 2022-09-02 PROCEDURE — G0299 HHS/HOSPICE OF RN EA 15 MIN: HCPCS

## 2022-09-02 PROCEDURE — A6210 FOAM DRG >16<=48 SQ IN W/O B: HCPCS

## 2022-09-02 PROCEDURE — A6214 FOAM DRG > 48 SQ IN W/BORDER: HCPCS

## 2022-09-02 PROCEDURE — A6266 IMPREG GAUZE NO H20/SAL/YARD: HCPCS

## 2022-09-02 PROCEDURE — A6452 HIGH COMPRES BAND W>=3"<5"YD: HCPCS

## 2022-09-02 PROCEDURE — A6212 FOAM DRG <=16 SQ IN W/BORDER: HCPCS

## 2022-09-02 ASSESSMENT — ENCOUNTER SYMPTOMS
DEPRESSED MOOD: 1
PAIN LOCATION - PAIN FREQUENCY: CONSTANT
LIMITED RANGE OF MOTION: 1
SUBJECTIVE PAIN PROGRESSION: UNCHANGED
ARTHRALGIAS: 1
PERSON REPORTING PAIN: PATIENT
PAIN LOCATION: LEFT KNEE
PAIN LOCATION - EXACERBATING FACTORS: WEIGHT BEARING
VOMITING: NO
PAIN LOCATION - PAIN FREQUENCY: CONSTANT
PAIN SEVERITY GOAL: 2/10
MUSCLE WEAKNESS: 1
NAUSEA: NO
PAIN LOCATION - PAIN SEVERITY: 9/10
DEBILITATING PAIN: 1
PAIN LOCATION - EXACERBATING FACTORS: "
LOWEST PAIN SEVERITY IN PAST 24 HOURS: 7/10
PAIN LOCATION: RIGHT ANKLE
PAIN: 1
PAIN LOCATION - RELIEVING FACTORS: "
HIGHEST PAIN SEVERITY IN PAST 24 HOURS: 10/10

## 2022-09-02 NOTE — TELEPHONE ENCOUNTER
Pt left voice message asking if we have received a referral for her to be seen and if we have received medical record from her recent hospitalization at [facility]. I tried to return call two times to let her know we do have a new referral and discharge summary from her hospitalization in our Epic system. However pt did not  and does not have identifier for the mailbox. Unable to leave message.

## 2022-09-02 NOTE — TELEPHONE ENCOUNTER
VOICEMAIL  1. Caller Name: Karine Ovalle                        Call Back Number: [phone] (home) [phone] (work)      2. Message: Patient called requesting Urgent referral to wound care [facility] per advice of Home Health. Please advise.    3. Patient approves office to leave a detailed voicemail/MyChart message: yes

## 2022-09-05 ENCOUNTER — HOME CARE VISIT (OUTPATIENT)
Dept: HOME HEALTH SERVICES | Facility: HOME HEALTHCARE | Age: 57
End: 2022-09-05
Payer: COMMERCIAL

## 2022-09-05 PROCEDURE — G0299 HHS/HOSPICE OF RN EA 15 MIN: HCPCS

## 2022-09-06 ENCOUNTER — HOME CARE VISIT (OUTPATIENT)
Dept: HOME HEALTH SERVICES | Facility: HOME HEALTHCARE | Age: 57
End: 2022-09-06
Payer: COMMERCIAL

## 2022-09-06 VITALS
OXYGEN SATURATION: 98 % | HEART RATE: 100 BPM | RESPIRATION RATE: 18 BRPM | DIASTOLIC BLOOD PRESSURE: 60 MMHG | TEMPERATURE: 98.8 F | SYSTOLIC BLOOD PRESSURE: 120 MMHG

## 2022-09-06 PROCEDURE — G0157 HHC PT ASSISTANT EA 15: HCPCS | Mod: CQ

## 2022-09-06 ASSESSMENT — ENCOUNTER SYMPTOMS
PAIN: 1
SUBJECTIVE PAIN PROGRESSION: UNCHANGED
HIGHEST PAIN SEVERITY IN PAST 24 HOURS: 10/10
LOWEST PAIN SEVERITY IN PAST 24 HOURS: 8/10
PERSON REPORTING PAIN: PATIENT
PAIN SEVERITY GOAL: 0/10
PAIN LOCATION: GENERALIZED

## 2022-09-07 ENCOUNTER — HOME CARE VISIT (OUTPATIENT)
Dept: HOME HEALTH SERVICES | Facility: HOME HEALTHCARE | Age: 57
End: 2022-09-07
Payer: COMMERCIAL

## 2022-09-07 VITALS
HEART RATE: 84 BPM | SYSTOLIC BLOOD PRESSURE: 136 MMHG | DIASTOLIC BLOOD PRESSURE: 70 MMHG | OXYGEN SATURATION: 92 % | TEMPERATURE: 98.7 F | RESPIRATION RATE: 16 BRPM

## 2022-09-07 PROCEDURE — G0156 HHCP-SVS OF AIDE,EA 15 MIN: HCPCS

## 2022-09-07 PROCEDURE — G0299 HHS/HOSPICE OF RN EA 15 MIN: HCPCS

## 2022-09-07 ASSESSMENT — ENCOUNTER SYMPTOMS
PAIN SEVERITY GOAL: 0/10
PAIN LOCATION - PAIN SEVERITY: 5/10
NAUSEA: NO
PERSON REPORTING PAIN: PATIENT
LIMITED RANGE OF MOTION: 1
PAIN LOCATION - PAIN QUALITY: ACHY
PAIN: 1
PAIN LOCATION - PAIN FREQUENCY: CONSTANT
PAIN LOCATION: RLE
PAIN LOCATION - RELIEVING FACTORS: RESTING, REPOSITION
VOMITING: NO
SUBJECTIVE PAIN PROGRESSION: WAXING AND WANING
PAIN LOCATION - EXACERBATING FACTORS: PHYSICAL ACTIVITIES
LOWEST PAIN SEVERITY IN PAST 24 HOURS: 4/10
HIGHEST PAIN SEVERITY IN PAST 24 HOURS: 5/10
PAIN LOCATION - PAIN DURATION: MOST OF THE TIME
MUSCLE WEAKNESS: 1

## 2022-09-07 ASSESSMENT — ACTIVITIES OF DAILY LIVING (ADL): OASIS_M1830: 05

## 2022-09-07 NOTE — CASE COMMUNICATION
Quality Review for SOC OASIS by SOLA Elkins, VERO on  September 7, 2022    Edits completed by SOLA Elkins RN:  1. Per narrative that patient needs moderate assistance and a walker for safety, and sponge bathes, the following changes were made:  is 5;  is 3  2. Completed [facility]-10 assessment per chart review  3.  is 9 per care plan therapy sets.  4. Safety measures checked ambulate only with assistance

## 2022-09-07 NOTE — CASE COMMUNICATION
Karine was crying today her stress levels are up as she found out one of her dogs is sick. She cont to have a hard time staying on task. She has increase dressing on her legs that make wearing shoes difficulty and painful but she will not modifiy her shoe. Cont to educate her to work on sitting ther ex and to keep her stress levels down Will cont with POC to increase her functional mob and ind to prevent further decline in IND. S/B Norah Raya PT

## 2022-09-08 NOTE — CASE COMMUNICATION
I agree with these changes.  ----- Message -----  From: Dora Elkins R.N.  Sent: 9/7/2022  10:51 AM PDT  To: Esther Butler R.N.      Quality Review for SOC OASIS by SOLA Elkins, VERO on  September 7, 2022    Edits completed by SOLA Elkins RN:  1. Per narrative that patient needs moderate assistance and a walker for safety, and sponge bathes, the following changes were made:  is 5;  is 3  2. Completed MAHC-10 asse ssment per chart review  3.  is 9 per care plan therapy sets.  4. Safety measures checked ambulate only with assistance

## 2022-09-09 ENCOUNTER — HOME CARE VISIT (OUTPATIENT)
Dept: HOME HEALTH SERVICES | Facility: HOME HEALTHCARE | Age: 57
End: 2022-09-09
Payer: COMMERCIAL

## 2022-09-09 VITALS
RESPIRATION RATE: 18 BRPM | OXYGEN SATURATION: 94 % | HEART RATE: 92 BPM | DIASTOLIC BLOOD PRESSURE: 60 MMHG | SYSTOLIC BLOOD PRESSURE: 121 MMHG | TEMPERATURE: 97.7 F

## 2022-09-09 VITALS
SYSTOLIC BLOOD PRESSURE: 121 MMHG | RESPIRATION RATE: 18 BRPM | DIASTOLIC BLOOD PRESSURE: 65 MMHG | OXYGEN SATURATION: 92 % | TEMPERATURE: 97.7 F | HEART RATE: 100 BPM

## 2022-09-09 PROCEDURE — G0156 HHCP-SVS OF AIDE,EA 15 MIN: HCPCS

## 2022-09-09 PROCEDURE — G0180 MD CERTIFICATION HHA PATIENT: HCPCS | Performed by: INTERNAL MEDICINE

## 2022-09-09 PROCEDURE — G0299 HHS/HOSPICE OF RN EA 15 MIN: HCPCS

## 2022-09-09 ASSESSMENT — ENCOUNTER SYMPTOMS
HIGHEST PAIN SEVERITY IN PAST 24 HOURS: 8/10
PAIN: 1
PERSON REPORTING PAIN: PATIENT
PAIN LOCATION: LEFT KNEE
LIMITED RANGE OF MOTION: 1
PAIN LOCATION - PAIN FREQUENCY: FREQUENT
PAIN LOCATION - PAIN SEVERITY: 5/10
SUBJECTIVE PAIN PROGRESSION: UNCHANGED
PAIN LOCATION - PAIN QUALITY: THROBBING
PAIN LOCATION - RELIEVING FACTORS: PAIN MED, REST
LOWEST PAIN SEVERITY IN PAST 24 HOURS: 5/10
PAIN SEVERITY GOAL: 2/10

## 2022-09-11 VITALS
TEMPERATURE: 97.7 F | DIASTOLIC BLOOD PRESSURE: 68 MMHG | OXYGEN SATURATION: 95 % | RESPIRATION RATE: 16 BRPM | HEART RATE: 88 BPM | SYSTOLIC BLOOD PRESSURE: 120 MMHG

## 2022-09-11 ASSESSMENT — ENCOUNTER SYMPTOMS
MUSCLE WEAKNESS: 1
SUBJECTIVE PAIN PROGRESSION: UNCHANGED
NAUSEA: DENIES
PERSON REPORTING PAIN: PATIENT
PAIN LOCATION - PAIN FREQUENCY: CONSTANT
PAIN LOCATION - PAIN QUALITY: ACHY
HIGHEST PAIN SEVERITY IN PAST 24 HOURS: 10/10
VOMITING: DENIES
PAIN LOCATION - PAIN DURATION: ALL THE TIME
PAIN LOCATION: LOWER BACK
PAIN: 1
PAIN LOCATION - EXACERBATING FACTORS: PHYSICAL ACTIVITIES
LOWEST PAIN SEVERITY IN PAST 24 HOURS: 7/10
PAIN SEVERITY GOAL: 7/10
LIMITED RANGE OF MOTION: 1
PAIN LOCATION - PAIN SEVERITY: 8/10

## 2022-09-12 ENCOUNTER — HOME CARE VISIT (OUTPATIENT)
Dept: HOME HEALTH SERVICES | Facility: HOME HEALTHCARE | Age: 57
End: 2022-09-12
Payer: COMMERCIAL

## 2022-09-12 ENCOUNTER — OFFICE VISIT (OUTPATIENT)
Dept: MEDICAL GROUP | Age: 57
End: 2022-09-12
Payer: COMMERCIAL

## 2022-09-12 VITALS
HEIGHT: 63 IN | TEMPERATURE: 97.8 F | BODY MASS INDEX: 33.84 KG/M2 | DIASTOLIC BLOOD PRESSURE: 76 MMHG | OXYGEN SATURATION: 96 % | WEIGHT: 191 LBS | SYSTOLIC BLOOD PRESSURE: 116 MMHG | RESPIRATION RATE: 16 BRPM | HEART RATE: 108 BPM

## 2022-09-12 DIAGNOSIS — S71.101D OPEN WOUND OF RIGHT HIP AND THIGH WITH COMPLICATION, SUBSEQUENT ENCOUNTER: ICD-10-CM

## 2022-09-12 DIAGNOSIS — S81.801A OPEN WOUND OF RIGHT LOWER EXTREMITY, INITIAL ENCOUNTER: ICD-10-CM

## 2022-09-12 DIAGNOSIS — S71.001D OPEN WOUND OF RIGHT HIP AND THIGH WITH COMPLICATION, SUBSEQUENT ENCOUNTER: ICD-10-CM

## 2022-09-12 PROCEDURE — G0299 HHS/HOSPICE OF RN EA 15 MIN: HCPCS

## 2022-09-12 PROCEDURE — 99215 OFFICE O/P EST HI 40 MIN: CPT | Performed by: INTERNAL MEDICINE

## 2022-09-12 ASSESSMENT — FIBROSIS 4 INDEX: FIB4 SCORE: 0.86

## 2022-09-12 ASSESSMENT — ENCOUNTER SYMPTOMS
BACK PAIN: 1
MYALGIAS: 1

## 2022-09-13 ENCOUNTER — HOME CARE VISIT (OUTPATIENT)
Dept: HOME HEALTH SERVICES | Facility: HOME HEALTHCARE | Age: 57
End: 2022-09-13
Payer: COMMERCIAL

## 2022-09-13 ENCOUNTER — HOSPITAL ENCOUNTER (OUTPATIENT)
Facility: MEDICAL CENTER | Age: 57
End: 2022-09-13
Attending: INTERNAL MEDICINE
Payer: COMMERCIAL

## 2022-09-13 LAB
ALBUMIN SERPL BCP-MCNC: 3.5 G/DL (ref 3.2–4.9)
ALBUMIN/GLOB SERPL: 0.9 G/DL
ALP SERPL-CCNC: 144 U/L (ref 30–99)
ALT SERPL-CCNC: 12 U/L (ref 2–50)
ANION GAP SERPL CALC-SCNC: 10 MMOL/L (ref 7–16)
AST SERPL-CCNC: 25 U/L (ref 12–45)
BASOPHILS # BLD AUTO: 0.7 % (ref 0–1.8)
BASOPHILS # BLD: 0.04 K/UL (ref 0–0.12)
BILIRUB SERPL-MCNC: 0.3 MG/DL (ref 0.1–1.5)
BUN SERPL-MCNC: 9 MG/DL (ref 8–22)
CALCIUM SERPL-MCNC: 9.2 MG/DL (ref 8.5–10.5)
CHLORIDE SERPL-SCNC: 103 MMOL/L (ref 96–112)
CO2 SERPL-SCNC: 24 MMOL/L (ref 20–33)
CREAT SERPL-MCNC: 0.61 MG/DL (ref 0.5–1.4)
CRP SERPL HS-MCNC: 1.72 MG/DL (ref 0–0.75)
EOSINOPHIL # BLD AUTO: 0.25 K/UL (ref 0–0.51)
EOSINOPHIL NFR BLD: 4.3 % (ref 0–6.9)
ERYTHROCYTE [DISTWIDTH] IN BLOOD BY AUTOMATED COUNT: 41.2 FL (ref 35.9–50)
ERYTHROCYTE [SEDIMENTATION RATE] IN BLOOD BY WESTERGREN METHOD: 17 MM/HOUR (ref 0–25)
GFR SERPLBLD CREATININE-BSD FMLA CKD-EPI: 104 ML/MIN/1.73 M 2
GLOBULIN SER CALC-MCNC: 4 G/DL (ref 1.9–3.5)
GLUCOSE SERPL-MCNC: 100 MG/DL (ref 65–99)
HCT VFR BLD AUTO: 36.1 % (ref 37–47)
HGB BLD-MCNC: 12.4 G/DL (ref 12–16)
IMM GRANULOCYTES # BLD AUTO: 0.01 K/UL (ref 0–0.11)
IMM GRANULOCYTES NFR BLD AUTO: 0.2 % (ref 0–0.9)
LYMPHOCYTES # BLD AUTO: 2.24 K/UL (ref 1–4.8)
LYMPHOCYTES NFR BLD: 38.4 % (ref 22–41)
MCH RBC QN AUTO: 28.1 PG (ref 27–33)
MCHC RBC AUTO-ENTMCNC: 34.3 G/DL (ref 33.6–35)
MCV RBC AUTO: 81.7 FL (ref 81.4–97.8)
MONOCYTES # BLD AUTO: 0.47 K/UL (ref 0–0.85)
MONOCYTES NFR BLD AUTO: 8 % (ref 0–13.4)
NEUTROPHILS # BLD AUTO: 2.83 K/UL (ref 2–7.15)
NEUTROPHILS NFR BLD: 48.4 % (ref 44–72)
NRBC # BLD AUTO: 0 K/UL
NRBC BLD-RTO: 0 /100 WBC
PLATELET # BLD AUTO: 419 K/UL (ref 164–446)
PMV BLD AUTO: 9.8 FL (ref 9–12.9)
POTASSIUM SERPL-SCNC: 4 MMOL/L (ref 3.6–5.5)
PROT SERPL-MCNC: 7.5 G/DL (ref 6–8.2)
RBC # BLD AUTO: 4.42 M/UL (ref 4.2–5.4)
SODIUM SERPL-SCNC: 137 MMOL/L (ref 135–145)
WBC # BLD AUTO: 5.8 K/UL (ref 4.8–10.8)

## 2022-09-13 PROCEDURE — 85025 COMPLETE CBC W/AUTO DIFF WBC: CPT

## 2022-09-13 PROCEDURE — 86140 C-REACTIVE PROTEIN: CPT

## 2022-09-13 PROCEDURE — 80053 COMPREHEN METABOLIC PANEL: CPT

## 2022-09-13 PROCEDURE — G0299 HHS/HOSPICE OF RN EA 15 MIN: HCPCS

## 2022-09-13 PROCEDURE — 85652 RBC SED RATE AUTOMATED: CPT

## 2022-09-13 NOTE — PROGRESS NOTES
Subjective     Karine Ovalle is a 57 y.o. female who presents with Follow-Up ([facility] paperwork )  This patient is here for further management of short-term disability and transportation issues.  She has a very complicated past medical history and her difficulty with communication to us has prolonged the office visit time spent with her.  Is unable to articulate her needs and preferences clearly incessantly.  Nevertheless we spent a great deal of time trying to understand her needs and medical problems.    In short this is a patient with longstanding obesity and secondary severe DJD and postoperative complications.  She is status post bilateral hip replacements in the last 3 years which have gone relatively well.  He also has severe DJD both knees and the right knee was replaced less than a year ago, was complicated by postoperative periarticular infection with MRSA and Pseudomonas and multiple debridements and notable surgeries to correct this.  However they were able to salvage the knee prosthesis.  Much of the problem stems from her having a need for a left total knee replacement and unable to bear weight on that leg which compromised her mobility.  She spent 2 months in the hospital from June through August 2022 undergoing repeated surgeries and debridement of the right thigh and lateral knee wounds.  This was complicated by switching from [facility] to [facility].  He was done for logistical reasons for some reason.    Nevertheless she had a wound VAC placed earlier this year which eventually became compromised and no longer functioning and need to be replaced but difficulty was brought on by switching facilities and getting the attentive care she needed.  She is still without a wound VAC and is now getting 3 times a week home health nursing wound irrigations and packing.    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    So at this  "time the patient needs to social service issues settled in addition to her chronic ongoing wound care and medical care.    1.  Transportation needs patient needs documentation as to her inability to take public transport on the regular bus lines.  She needs RTC access mobile unit to take her to and from appointments which are many.  And also to various facilities around town for other needs.  She is unable to stand for any length of time due to the severe left knee pain from DJD as stated above and the weakness stemming from this.  Please refer to the report that is been completed.    2.  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  There appear to be no workplace commendations can be made for this patient to work at her office setting because she requires to get up and down frequently which she is unable to do at the workplace.  She is limited by her severe wound infections and degenerative joint disease.    Finally her medical needs are such that she needs wound VAC placement and more aggressive wound care therapy to get her wounds to heal adequately so that she can get back to the workplace and use public transportation.    Therefore stat repeat wound care consult has been placed to try to expedite this patient's recovery.  All help with regard to  and nursing care would be appreciated to get this patient back to optimal functioning.          HPI  Please see above,  Review of Systems   Musculoskeletal:  Positive for back pain, joint pain and myalgias.   Skin:  Positive for rash.   See above comments         Objective     /76 (BP Location: Left arm, Patient Position: Sitting, BP Cuff Size: Large adult)   Pulse (!) 108   Temp 36.6 °C (97.8 °F) (Temporal)   Resp 16   Ht 1.6 m (5' 3\")   Wt 86.6 kg (191 lb)   LMP  (LMP Unknown)   SpO2 96%   BMI 33.83 kg/m²      Physical Exam  Vitals reviewed.   Constitutional:       General: She is not " in acute distress.     Appearance: She is well-developed. She is not diaphoretic.   HENT:      Head: Normocephalic and atraumatic.      Right Ear: External ear normal.      Left Ear: External ear normal.      Nose: Nose normal.      Mouth/Throat:      Pharynx: No oropharyngeal exudate.   Eyes:      General: No scleral icterus.        Right eye: No discharge.         Left eye: No discharge.      Conjunctiva/sclera: Conjunctivae normal.      Pupils: Pupils are equal, round, and reactive to light.   Neck:      Thyroid: No thyromegaly.      Vascular: No JVD.      Trachea: No tracheal deviation.   Cardiovascular:      Rate and Rhythm: Normal rate and regular rhythm.      Heart sounds: Normal heart sounds. No murmur heard.    No friction rub. No gallop.   Pulmonary:      Effort: Pulmonary effort is normal. No respiratory distress.      Breath sounds: Normal breath sounds. No stridor. No wheezing or rales.   Chest:      Chest wall: No tenderness.   Abdominal:      General: Bowel sounds are normal. There is no distension.      Palpations: Abdomen is soft. There is no mass.      Tenderness: There is no abdominal tenderness. There is no guarding or rebound.   Musculoskeletal:         General: Swelling and tenderness present. Normal range of motion.      Cervical back: Normal range of motion and neck supple.      Comments: There is severe tenderness and swelling of the right lower leg requiring compression stockings.  The lateral aspect of the right mid and upper leg and thigh show wounds that are dressed and packed heavily therefore not examined.  Dressings appear not to be soiled.  The left knee is slightly swollen tender and erythematous but has full extension and flexion to 90 degrees.  But muscle wasting is noted.  Strength of left leg is 4 out of 5.       Lymphadenopathy:      Cervical: No cervical adenopathy.   Skin:     General: Skin is warm and dry.      Coloration: Skin is not pale.      Findings: Erythema and lesion  present. No rash.      Comments: Wounds heavily packed on right leg thigh lateral aspect and knee plantar aspect right leg.   Neurological:      Mental Status: She is alert and oriented to person, place, and time.      Cranial Nerves: No cranial nerve deficit.      Motor: No abnormal muscle tone.      Coordination: Coordination normal.      Deep Tendon Reflexes: Reflexes are normal and symmetric. Reflexes normal.   Psychiatric:         Behavior: Behavior normal.         Thought Content: Thought content normal.         Judgment: Judgment normal.                            Assessment & Plan        1. Open wound of right lower extremity, initial encounter     - Referral to Wound Clinic-stat consult placed for wound VAC.    2. Open wound of right hip and thigh with complication, subsequent encounter      The above.  - Referral to Wound Clinic              45 minute face-to-face encounter took place today.  More than half of this time was spent in the coordination of care of the above problems, as well as counseling.

## 2022-09-14 ENCOUNTER — HOME CARE VISIT (OUTPATIENT)
Dept: HOME HEALTH SERVICES | Facility: HOME HEALTHCARE | Age: 57
End: 2022-09-14
Payer: COMMERCIAL

## 2022-09-14 VITALS
OXYGEN SATURATION: 96 % | OXYGEN SATURATION: 96 % | TEMPERATURE: 97.8 F | DIASTOLIC BLOOD PRESSURE: 60 MMHG | HEART RATE: 92 BPM | RESPIRATION RATE: 18 BRPM | DIASTOLIC BLOOD PRESSURE: 60 MMHG | SYSTOLIC BLOOD PRESSURE: 120 MMHG | HEART RATE: 92 BPM | TEMPERATURE: 97.8 F | SYSTOLIC BLOOD PRESSURE: 120 MMHG | RESPIRATION RATE: 18 BRPM

## 2022-09-14 VITALS
HEART RATE: 88 BPM | RESPIRATION RATE: 16 BRPM | TEMPERATURE: 97.7 F | SYSTOLIC BLOOD PRESSURE: 118 MMHG | OXYGEN SATURATION: 95 % | DIASTOLIC BLOOD PRESSURE: 62 MMHG

## 2022-09-14 PROCEDURE — G0156 HHCP-SVS OF AIDE,EA 15 MIN: HCPCS

## 2022-09-14 PROCEDURE — G0299 HHS/HOSPICE OF RN EA 15 MIN: HCPCS

## 2022-09-14 PROCEDURE — G0151 HHCP-SERV OF PT,EA 15 MIN: HCPCS

## 2022-09-14 ASSESSMENT — ENCOUNTER SYMPTOMS
PAIN SEVERITY GOAL: 6/10
NAUSEA: NO
HIGHEST PAIN SEVERITY IN PAST 24 HOURS: 6/10
SUBJECTIVE PAIN PROGRESSION: WAXING AND WANING
PAIN LOCATION - PAIN FREQUENCY: CONSTANT
PAIN LOCATION - PAIN SEVERITY: 6/10
PAIN LOCATION - PAIN QUALITY: ACHY
PERSON REPORTING PAIN: PATIENT
PAIN LOCATION: LOWER BACK
SHORTNESS OF BREATH: 1
PAIN LOCATION - EXACERBATING FACTORS: AMBULATION
LOWEST PAIN SEVERITY IN PAST 24 HOURS: 5/10
PAIN LOCATION - PAIN DURATION: ALL THE TIME
DYSPNEA ACTIVITY LEVEL: AFTER AMBULATING MORE THAN 20 FT
MUSCLE WEAKNESS: 1
PAIN: 1
VOMITING: NO
PAIN LOCATION - RELIEVING FACTORS: RESTING
LIMITED RANGE OF MOTION: 1

## 2022-09-15 VITALS
DIASTOLIC BLOOD PRESSURE: 60 MMHG | HEART RATE: 91 BPM | OXYGEN SATURATION: 95 % | SYSTOLIC BLOOD PRESSURE: 120 MMHG | TEMPERATURE: 97.8 F | RESPIRATION RATE: 16 BRPM

## 2022-09-15 ASSESSMENT — ENCOUNTER SYMPTOMS
LIMITED RANGE OF MOTION: 1
PAIN LOCATION - RELIEVING FACTORS: RESTING, REPOSITION
HIGHEST PAIN SEVERITY IN PAST 24 HOURS: 8/10
PERSON REPORTING PAIN: PATIENT
PAIN: 1
PAIN LOCATION - PAIN FREQUENCY: CONSTANT
PAIN SEVERITY GOAL: 7/10
PAIN LOCATION - EXACERBATING FACTORS: PHYSICAL ACTIVITY
MUSCLE WEAKNESS: 1
PAIN LOCATION - PAIN QUALITY: ACHY
PAIN LOCATION: LEFT KNEE
NAUSEA: NO
SUBJECTIVE PAIN PROGRESSION: WAXING AND WANING
VOMITING: NO
LOWEST PAIN SEVERITY IN PAST 24 HOURS: 7/10
PAIN LOCATION - PAIN SEVERITY: 7/10

## 2022-09-16 ENCOUNTER — HOME CARE VISIT (OUTPATIENT)
Dept: HOME HEALTH SERVICES | Facility: HOME HEALTHCARE | Age: 57
End: 2022-09-16
Payer: COMMERCIAL

## 2022-09-16 VITALS
RESPIRATION RATE: 18 BRPM | OXYGEN SATURATION: 92 % | DIASTOLIC BLOOD PRESSURE: 72 MMHG | SYSTOLIC BLOOD PRESSURE: 119 MMHG | HEART RATE: 92 BPM | TEMPERATURE: 98.9 F

## 2022-09-16 PROCEDURE — G0156 HHCP-SVS OF AIDE,EA 15 MIN: HCPCS

## 2022-09-16 PROCEDURE — G0299 HHS/HOSPICE OF RN EA 15 MIN: HCPCS

## 2022-09-16 SDOH — ECONOMIC STABILITY: HOUSING INSECURITY: UNSAFE APPLIANCES: 1

## 2022-09-16 SDOH — ECONOMIC STABILITY: HOUSING INSECURITY
HOME SAFETY: SPACE HEATER COVERED WITH CLOTHES AND PRESENTS A FIRE RISK EDUCATIO PROVIDED TO PATIENT ABOUT FIRE SAFETY. LIVING AREA IS A HOARDING SITUATION.

## 2022-09-16 ASSESSMENT — ENCOUNTER SYMPTOMS
PAIN SEVERITY GOAL: 0/10
PAIN: 1
NAUSEA: DENIES
HIGHEST PAIN SEVERITY IN PAST 24 HOURS: 7/10
SUBJECTIVE PAIN PROGRESSION: WAXING AND WANING
PAIN SEVERITY GOAL: 1/10
PAIN LOCATION - PAIN QUALITY: ACHES, SWELLING
PAIN: 1
HIGHEST PAIN SEVERITY IN PAST 24 HOURS: 6/10
PAIN LOCATION - EXACERBATING FACTORS: WALKING, USE
LOWEST PAIN SEVERITY IN PAST 24 HOURS: 6/10
SUBJECTIVE PAIN PROGRESSION: UNCHANGED
PAIN LOCATION - PAIN SEVERITY: 5/10
PAIN LOCATION - PAIN FREQUENCY: FREQUENT
PAIN LOCATION: LEFT KNEE
MUSCLE WEAKNESS: 1
PAIN LOCATION - RELIEVING FACTORS: PAIN MEDS
VOMITING: DENIES
LOWEST PAIN SEVERITY IN PAST 24 HOURS: 3/10
PERSON REPORTING PAIN: PATIENT
MENTAL STATUS CHANGE: 0
PERSON REPORTING PAIN: PATIENT

## 2022-09-17 VITALS
DIASTOLIC BLOOD PRESSURE: 60 MMHG | SYSTOLIC BLOOD PRESSURE: 120 MMHG | TEMPERATURE: 97.8 F | OXYGEN SATURATION: 96 % | HEART RATE: 92 BPM | RESPIRATION RATE: 18 BRPM

## 2022-09-17 ASSESSMENT — ENCOUNTER SYMPTOMS
PERSON REPORTING PAIN: PATIENT
PAIN SEVERITY GOAL: 7/10
NAUSEA: NO
PAIN LOCATION - PAIN FREQUENCY: CONSTANT
PAIN LOCATION - PAIN DURATION: ALL THE TIME
LIMITED RANGE OF MOTION: 1
HIGHEST PAIN SEVERITY IN PAST 24 HOURS: 7/10
VOMITING: NO
PAIN LOCATION - EXACERBATING FACTORS: AMBULATION
SUBJECTIVE PAIN PROGRESSION: UNCHANGED
PAIN LOCATION - RELIEVING FACTORS: RESTING, REPOSITION
PAIN: 1
MUSCLE WEAKNESS: 1
PAIN LOCATION - PAIN QUALITY: ACHY
PAIN LOCATION: LEFT KNEE
LOWEST PAIN SEVERITY IN PAST 24 HOURS: 5/10
PAIN LOCATION - PAIN SEVERITY: 5/10

## 2022-09-19 ENCOUNTER — HOME CARE VISIT (OUTPATIENT)
Dept: HOME HEALTH SERVICES | Facility: HOME HEALTHCARE | Age: 57
End: 2022-09-19
Payer: COMMERCIAL

## 2022-09-19 VITALS
OXYGEN SATURATION: 98 % | TEMPERATURE: 98 F | HEART RATE: 98 BPM | SYSTOLIC BLOOD PRESSURE: 140 MMHG | DIASTOLIC BLOOD PRESSURE: 86 MMHG | RESPIRATION RATE: 18 BRPM

## 2022-09-19 PROCEDURE — G0299 HHS/HOSPICE OF RN EA 15 MIN: HCPCS

## 2022-09-19 PROCEDURE — G0151 HHCP-SERV OF PT,EA 15 MIN: HCPCS

## 2022-09-21 ENCOUNTER — HOME CARE VISIT (OUTPATIENT)
Dept: HOME HEALTH SERVICES | Facility: HOME HEALTHCARE | Age: 57
End: 2022-09-21
Payer: COMMERCIAL

## 2022-09-21 PROCEDURE — G0299 HHS/HOSPICE OF RN EA 15 MIN: HCPCS

## 2022-09-22 ENCOUNTER — OFFICE VISIT (OUTPATIENT)
Dept: MEDICAL GROUP | Age: 57
End: 2022-09-22
Payer: COMMERCIAL

## 2022-09-22 VITALS
OXYGEN SATURATION: 97 % | TEMPERATURE: 97.7 F | HEART RATE: 91 BPM | DIASTOLIC BLOOD PRESSURE: 64 MMHG | SYSTOLIC BLOOD PRESSURE: 120 MMHG | RESPIRATION RATE: 16 BRPM

## 2022-09-22 VITALS
RESPIRATION RATE: 16 BRPM | BODY MASS INDEX: 33.84 KG/M2 | HEART RATE: 96 BPM | HEIGHT: 63 IN | OXYGEN SATURATION: 96 % | WEIGHT: 191 LBS | DIASTOLIC BLOOD PRESSURE: 80 MMHG | SYSTOLIC BLOOD PRESSURE: 120 MMHG | TEMPERATURE: 98 F

## 2022-09-22 VITALS
TEMPERATURE: 97.7 F | SYSTOLIC BLOOD PRESSURE: 136 MMHG | DIASTOLIC BLOOD PRESSURE: 74 MMHG | RESPIRATION RATE: 16 BRPM | OXYGEN SATURATION: 97 % | HEART RATE: 95 BPM

## 2022-09-22 DIAGNOSIS — M54.42 CHRONIC BILATERAL LOW BACK PAIN WITH BILATERAL SCIATICA: ICD-10-CM

## 2022-09-22 DIAGNOSIS — Z02.9 ENCOUNTERS FOR ADMINISTRATIVE PURPOSES: ICD-10-CM

## 2022-09-22 DIAGNOSIS — M51.36 DDD (DEGENERATIVE DISC DISEASE), LUMBAR: ICD-10-CM

## 2022-09-22 DIAGNOSIS — E66.9 OBESITY (BMI 30-39.9): ICD-10-CM

## 2022-09-22 DIAGNOSIS — G89.29 CHRONIC BILATERAL LOW BACK PAIN WITH BILATERAL SCIATICA: ICD-10-CM

## 2022-09-22 DIAGNOSIS — M54.41 CHRONIC BILATERAL LOW BACK PAIN WITH BILATERAL SCIATICA: ICD-10-CM

## 2022-09-22 DIAGNOSIS — M50.30 DDD (DEGENERATIVE DISC DISEASE), CERVICAL: ICD-10-CM

## 2022-09-22 PROCEDURE — 99214 OFFICE O/P EST MOD 30 MIN: CPT | Performed by: INTERNAL MEDICINE

## 2022-09-22 RX ORDER — GABAPENTIN 800 MG/1
800 TABLET ORAL 3 TIMES DAILY
Qty: 360 TABLET | Refills: 3 | Status: SHIPPED | OUTPATIENT
Start: 2022-09-22 | End: 2023-07-13 | Stop reason: SDUPTHER

## 2022-09-22 RX ORDER — METHOCARBAMOL 500 MG/1
1000 TABLET, FILM COATED ORAL 4 TIMES DAILY PRN
Qty: 240 TABLET | Refills: 5 | Status: SHIPPED | OUTPATIENT
Start: 2022-09-22 | End: 2023-07-13 | Stop reason: SDUPTHER

## 2022-09-22 RX ORDER — PHENTERMINE HYDROCHLORIDE 37.5 MG/1
37.5 CAPSULE ORAL EVERY MORNING
Qty: 90 CAPSULE | Refills: 1 | Status: SHIPPED | OUTPATIENT
Start: 2022-09-22 | End: 2023-03-09 | Stop reason: SDUPTHER

## 2022-09-22 ASSESSMENT — ENCOUNTER SYMPTOMS
NEUROLOGICAL NEGATIVE: 1
PAIN LOCATION - PAIN DURATION: ALL THE TIME
PAIN LOCATION - PAIN QUALITY: ACHES, SWELLING
LOWEST PAIN SEVERITY IN PAST 24 HOURS: 6/10
MUSCULOSKELETAL NEGATIVE: 1
PAIN LOCATION - EXACERBATING FACTORS: WOUND
PAIN LOCATION - PAIN SEVERITY: 4/10
SUBJECTIVE PAIN PROGRESSION: WAXING AND WANING
CONSTITUTIONAL NEGATIVE: 1
PAIN: 1
PAIN LOCATION - PAIN QUALITY: ACHY
NAUSEA: NO
LIMITED RANGE OF MOTION: 1
MUSCLE WEAKNESS: 1
PAIN SEVERITY GOAL: 6/10
PAIN LOCATION - PAIN SEVERITY: 6/10
PAIN LOCATION - RELIEVING FACTORS: RESTING, REPOSITION
LIMITED RANGE OF MOTION: 1
PERSON REPORTING PAIN: PATIENT
VOMITING: NO
PAIN LOCATION - RELIEVING FACTORS: RESTING
PAIN LOCATION - PAIN SEVERITY: 6/10
PAIN LOCATION - PAIN DURATION: MOST OF THE TIME
LOWEST PAIN SEVERITY IN PAST 24 HOURS: 3/10
PAIN LOCATION - EXACERBATING FACTORS: AMBULATION
PAIN LOCATION: RIGHT LEG
PAIN LOCATION - PAIN FREQUENCY: CONSTANT
PAIN LOCATION - PAIN FREQUENCY: CONSTANT
SUBJECTIVE PAIN PROGRESSION: UNCHANGED
LOWEST PAIN SEVERITY IN PAST 24 HOURS: 6/10
NAUSEA: NO
RESPIRATORY NEGATIVE: 1
PAIN LOCATION - EXACERBATING FACTORS: AMBULATION
PAIN LOCATION: LEFT KNEE
PAIN SEVERITY GOAL: 6/10
VOMITING: NO
PAIN LOCATION - PAIN FREQUENCY: FREQUENT
PAIN LOCATION - PAIN QUALITY: ACHY
PERSON REPORTING PAIN: PATIENT
MUSCLE WEAKNESS: 1
PAIN LOCATION - RELIEVING FACTORS: PAIN MEDS
EYES NEGATIVE: 1
PAIN LOCATION: LEFT KNEE
SUBJECTIVE PAIN PROGRESSION: WAXING AND WANING
PAIN: 1
HIGHEST PAIN SEVERITY IN PAST 24 HOURS: 6/10
HIGHEST PAIN SEVERITY IN PAST 24 HOURS: 6/10
HIGHEST PAIN SEVERITY IN PAST 24 HOURS: 8/10
PERSON REPORTING PAIN: PATIENT
CARDIOVASCULAR NEGATIVE: 1
PAIN SEVERITY GOAL: 0/10
PAIN: 1
PSYCHIATRIC NEGATIVE: 1
GASTROINTESTINAL NEGATIVE: 1

## 2022-09-22 ASSESSMENT — FIBROSIS 4 INDEX: FIB4 SCORE: 0.98

## 2022-09-23 ENCOUNTER — HOME CARE VISIT (OUTPATIENT)
Dept: HOME HEALTH SERVICES | Facility: HOME HEALTHCARE | Age: 57
End: 2022-09-23
Payer: COMMERCIAL

## 2022-09-23 PROCEDURE — G0299 HHS/HOSPICE OF RN EA 15 MIN: HCPCS

## 2022-09-23 NOTE — PROGRESS NOTES
"Subjective     Karine Ovalle is a 57 y.o. female who presents with Follow-Up ([facility] paper work  refills )  Patient is here for follow-up at for her [facility] paperwork.  Please refer to previous office note.  Forms are completed patient is doing relatively well at this time.  However she still needs to get her wound VAC placed as per last office note.  Awaiting admittance to [facility] wound care.          HPI    Review of Systems   Constitutional: Negative.    HENT: Negative.     Eyes: Negative.    Respiratory: Negative.     Cardiovascular: Negative.    Gastrointestinal: Negative.    Genitourinary: Negative.    Musculoskeletal: Negative.    Skin: Negative.    Neurological: Negative.    Endo/Heme/Allergies: Negative.    Psychiatric/Behavioral: Negative.              Objective     /80 (BP Location: Right arm, Patient Position: Sitting, BP Cuff Size: Adult)   Pulse 96   Temp 36.7 °C (98 °F) (Temporal)   Resp 16   Ht 1.6 m (5' 3\")   Wt 86.6 kg (191 lb)   LMP  (LMP Unknown)   SpO2 96%   BMI 33.83 kg/m²      Physical Exam  Vitals reviewed.   Constitutional:       General: She is not in acute distress.     Appearance: She is well-developed. She is not diaphoretic.   HENT:      Head: Normocephalic and atraumatic.      Right Ear: External ear normal.      Left Ear: External ear normal.      Nose: Nose normal.      Mouth/Throat:      Pharynx: No oropharyngeal exudate.   Eyes:      General: No scleral icterus.        Right eye: No discharge.         Left eye: No discharge.      Conjunctiva/sclera: Conjunctivae normal.      Pupils: Pupils are equal, round, and reactive to light.   Neck:      Thyroid: No thyromegaly.      Vascular: No JVD.      Trachea: No tracheal deviation.   Cardiovascular:      Rate and Rhythm: Normal rate and regular rhythm.      Heart sounds: Normal heart sounds. No murmur heard.    No friction rub. No gallop.   Pulmonary:      Effort: Pulmonary effort is normal. No respiratory distress.    "   Breath sounds: Normal breath sounds. No stridor. No wheezing or rales.   Chest:      Chest wall: No tenderness.   Abdominal:      General: Bowel sounds are normal. There is no distension.      Palpations: Abdomen is soft. There is no mass.      Tenderness: There is no abdominal tenderness. There is no guarding or rebound.   Musculoskeletal:         General: No tenderness. Normal range of motion.      Cervical back: Normal range of motion and neck supple.   Lymphadenopathy:      Cervical: No cervical adenopathy.   Skin:     General: Skin is warm and dry.      Coloration: Skin is not pale.      Findings: No erythema or rash.   Neurological:      Mental Status: She is alert and oriented to person, place, and time.      Cranial Nerves: No cranial nerve deficit.      Motor: No abnormal muscle tone.      Coordination: Coordination normal.      Deep Tendon Reflexes: Reflexes are normal and symmetric. Reflexes normal.   Psychiatric:         Behavior: Behavior normal.         Thought Content: Thought content normal.         Judgment: Judgment normal.                           Assessment & Plan        1. Encounters for administrative purposes  Appropriate forms filled out and signed.  Continue on disability for this.  Patient will remain disabled for least the next 6 months.    2. Obesity (BMI 30-39.9)  Under good control continue current regimen  - phentermine 37.5 MG capsule; Take 1 Capsule by mouth every morning for 90 days.  Dispense: 90 Capsule; Refill: 1    3. Chronic bilateral low back pain with bilateral sciatica  Good control continue current regimen- methocarbamol (ROBAXIN) 500 MG Tab; Take 2 Tablets by mouth 4 times a day as needed (back pain and spasms). Indications: Musculoskeletal Pain  Dispense: 240 Tablet; Refill: 5    4. DDD (degenerative disc disease), lumbar  Good control continue current regiment  - methocarbamol (ROBAXIN) 500 MG Tab; Take 2 Tablets by mouth 4 times a day as needed (back pain and  spasms). Indications: Musculoskeletal Pain  Dispense: 240 Tablet; Refill: 5    5. DDD (degenerative disc disease), cervical- MOD-SEVERE SPINE [place]- dr brennan; fusion and laminectomy C3-T1 12/5/2018 dr brennan     Good control continue current regimen  - methocarbamol (ROBAXIN) 500 MG Tab; Take 2 Tablets by mouth 4 times a day as needed (back pain and spasms). Indications: Musculoskeletal Pain  Dispense: 240 Tablet; Refill: 5

## 2022-09-25 VITALS
TEMPERATURE: 97 F | OXYGEN SATURATION: 97 % | HEART RATE: 94 BPM | RESPIRATION RATE: 18 BRPM | SYSTOLIC BLOOD PRESSURE: 118 MMHG | DIASTOLIC BLOOD PRESSURE: 76 MMHG

## 2022-09-25 ASSESSMENT — ENCOUNTER SYMPTOMS
PAIN LOCATION: HANDS
PAIN LOCATION - PAIN FREQUENCY: INTERMITTENT
PAIN: 1
MUSCLE WEAKNESS: 1
SUBJECTIVE PAIN PROGRESSION: WAXING AND WANING
PAIN SEVERITY GOAL: 5/10
HIGHEST PAIN SEVERITY IN PAST 24 HOURS: 7/10
LIMITED RANGE OF MOTION: 1
PERSON REPORTING PAIN: PATIENT
PAIN LOCATION - PAIN SEVERITY: 6/10
LOWEST PAIN SEVERITY IN PAST 24 HOURS: 9/10
PAIN LOCATION - PAIN QUALITY: TINGLING

## 2022-09-26 ENCOUNTER — TELEPHONE (OUTPATIENT)
Dept: MEDICAL GROUP | Age: 57
End: 2022-09-26
Payer: COMMERCIAL

## 2022-09-26 ENCOUNTER — HOME CARE VISIT (OUTPATIENT)
Dept: HOME HEALTH SERVICES | Facility: HOME HEALTHCARE | Age: 57
End: 2022-09-26
Payer: COMMERCIAL

## 2022-09-26 PROCEDURE — G0299 HHS/HOSPICE OF RN EA 15 MIN: HCPCS

## 2022-09-26 NOTE — TELEPHONE ENCOUNTER
FINAL PRIOR AUTHORIZATION STATUS:    1.  Name of Medication & Dose: phentermine 37.5 MG capsule     2. Prior Auth Status: Approved through 9/26/2023     3. Action Taken: Pharmacy Notified: yes Patient Notified: no

## 2022-09-28 ENCOUNTER — HOME CARE VISIT (OUTPATIENT)
Dept: HOME HEALTH SERVICES | Facility: HOME HEALTHCARE | Age: 57
End: 2022-09-28
Payer: COMMERCIAL

## 2022-09-28 VITALS
SYSTOLIC BLOOD PRESSURE: 138 MMHG | DIASTOLIC BLOOD PRESSURE: 76 MMHG | OXYGEN SATURATION: 98 % | TEMPERATURE: 97.7 F | HEART RATE: 105 BPM | RESPIRATION RATE: 18 BRPM

## 2022-09-28 VITALS
OXYGEN SATURATION: 98 % | RESPIRATION RATE: 18 BRPM | SYSTOLIC BLOOD PRESSURE: 138 MMHG | TEMPERATURE: 97.7 F | HEART RATE: 105 BPM | DIASTOLIC BLOOD PRESSURE: 76 MMHG

## 2022-09-28 PROCEDURE — G0156 HHCP-SVS OF AIDE,EA 15 MIN: HCPCS

## 2022-09-28 PROCEDURE — G0299 HHS/HOSPICE OF RN EA 15 MIN: HCPCS

## 2022-09-28 PROCEDURE — 665001 SOC-HOME HEALTH

## 2022-09-28 PROCEDURE — G0151 HHCP-SERV OF PT,EA 15 MIN: HCPCS

## 2022-09-29 VITALS
SYSTOLIC BLOOD PRESSURE: 126 MMHG | TEMPERATURE: 97.8 F | OXYGEN SATURATION: 97 % | DIASTOLIC BLOOD PRESSURE: 62 MMHG | HEART RATE: 87 BPM | RESPIRATION RATE: 16 BRPM

## 2022-09-29 ASSESSMENT — ENCOUNTER SYMPTOMS
PERSON REPORTING PAIN: PATIENT
SUBJECTIVE PAIN PROGRESSION: UNCHANGED
DYSPNEA ACTIVITY LEVEL: AFTER AMBULATING MORE THAN 20 FT
PAIN LOCATION - PAIN QUALITY: ACHY
PAIN SEVERITY GOAL: 7/10
PAIN: 1
PAIN LOCATION - PAIN SEVERITY: 5/10
PAIN LOCATION - PAIN FREQUENCY: INTERMITTENT
HIGHEST PAIN SEVERITY IN PAST 24 HOURS: 6/10
NAUSEA: DENIES
VOMITING: DENIES
LIMITED RANGE OF MOTION: 1
MUSCLE WEAKNESS: 1
PAIN LOCATION: LOWER BACK, LEFT KNEE
PAIN LOCATION - EXACERBATING FACTORS: AMBULATION
SHORTNESS OF BREATH: 1
PAIN LOCATION - PAIN DURATION: HOURS
LOWEST PAIN SEVERITY IN PAST 24 HOURS: 5/10
PAIN LOCATION - RELIEVING FACTORS: RESTING

## 2022-09-30 ENCOUNTER — HOME CARE VISIT (OUTPATIENT)
Dept: HOME HEALTH SERVICES | Facility: HOME HEALTHCARE | Age: 57
End: 2022-09-30
Payer: COMMERCIAL

## 2022-09-30 ENCOUNTER — HOME CARE VISIT (OUTPATIENT)
Dept: HOME HEALTH SERVICES | Facility: HOME HEALTHCARE | Age: 57
End: 2022-09-30

## 2022-09-30 VITALS
TEMPERATURE: 97.3 F | SYSTOLIC BLOOD PRESSURE: 129 MMHG | RESPIRATION RATE: 20 BRPM | HEART RATE: 96 BPM | OXYGEN SATURATION: 95 % | DIASTOLIC BLOOD PRESSURE: 73 MMHG

## 2022-09-30 PROCEDURE — G0299 HHS/HOSPICE OF RN EA 15 MIN: HCPCS

## 2022-09-30 PROCEDURE — G0156 HHCP-SVS OF AIDE,EA 15 MIN: HCPCS

## 2022-10-01 SDOH — ECONOMIC STABILITY: HOUSING INSECURITY: HOME SAFETY: PT LIVES WITH HER SO AND HOME IS UNKEPT AND CLUTTERED

## 2022-10-01 ASSESSMENT — GAIT ASSESSMENTS
BALANCE AND GAIT SCORE: 17
PATH: 1 - MILD/MODERATE DEVIATION OR USES WALKING AID
TRUNK SCORE: 0
INITIATION OF GAIT IMMEDIATELY AFTER GO: 0 - ANY HESITANCY OR MULTIPLE ATTEMPTS TO START
STEP CONTINUITY: 0 - STOPPING OR DISCONTINUITY BETWEEN STEPS
WALKING STANCE: 0 - HEELS APART
GAIT SCORE: 6
STEP SYMMETRY: 1 - RIGHT AND LEFT STEP LENGTH APPEAR EQUAL
PATH SCORE: 1
TRUNK: 0 - MARKED SWAY OR USES WALKING AID

## 2022-10-01 ASSESSMENT — ACTIVITIES OF DAILY LIVING (ADL)
FEEDING_WITHIN_DEFINED_LIMITS: 1
AMBULATION ASSISTANCE: SUPERVISION
PHYSICAL_TRANSFERS_DEVICES: FWW
GROOMING_WITHIN_DEFINED_LIMITS: 1
PHYSICAL TRANSFERS ASSESSED: 1
AMBULATION_DISTANCE/DURATION_TOLERATED: 25FT
AMBULATION ASSISTANCE: 1
CURRENT_FUNCTION: SUPERVISION
AMBULATION ASSISTANCE ON FLAT SURFACES: 1
AMBULATION ASSISTANCE: STAND BY ASSIST

## 2022-10-01 ASSESSMENT — ENCOUNTER SYMPTOMS
PAIN LOCATION - PAIN SEVERITY: 6/10
SUBJECTIVE PAIN PROGRESSION: WAXING AND WANING
HIGHEST PAIN SEVERITY IN PAST 24 HOURS: 8/10
MUSCLE WEAKNESS: 1
SUBJECTIVE PAIN PROGRESSION: UNCHANGED
LOWEST PAIN SEVERITY IN PAST 24 HOURS: 0/10
LOWEST PAIN SEVERITY IN PAST 24 HOURS: 3/10
PAIN LOCATION - RELIEVING FACTORS: PAIN MEDS
PAIN LOCATION - PAIN DURATION: HOURS
HIGHEST PAIN SEVERITY IN PAST 24 HOURS: 6/10
PAIN: 1
PAIN LOCATION: BACK
PAIN: 1
LIMITED RANGE OF MOTION: 1
PAIN SEVERITY GOAL: 6/10
VOMITING: DENIES
PAIN LOCATION - PAIN QUALITY: ACHY
PERSON REPORTING PAIN: PATIENT
PAIN LOCATION - PAIN FREQUENCY: INTERMITTENT
PERSON REPORTING PAIN: PATIENT
PAIN LOCATION - RELIEVING FACTORS: RESTING, REPOSITION
PAIN LOCATION - EXACERBATING FACTORS: WALKING OR STANDING
PAIN LOCATION - PAIN SEVERITY: 7/10
PAIN SEVERITY GOAL: 2/10
PAIN LOCATION: LOWER BACK
PAIN LOCATION - PAIN FREQUENCY: FREQUENT
NAUSEA: DENIES

## 2022-10-01 ASSESSMENT — BALANCE ASSESSMENTS
ARISING SCORE: 1
STANDING BALANCE: 1 - STEADY BUT WIDE STANCE AND USES CANE OR OTHER SUPPORT
SITTING BALANCE: 1 - STEADY, SAFE
SITTING DOWN: 1 - USES ARMS OR NOT SMOOTH MOTION
ATTEMPTS TO ARISE: 1 - ABLE, REQUIRES MORE THAN ONE ATTEMPT
TURNING 360 DEGREES STEPS: 0 - DISCONTINUOUS STEPS
ARISES: 1 - ABLE, USES ARMS TO HELP
NUDGED SCORE: 2
NUDGED: 2 - STEADY
BALANCE SCORE: 11
IMMEDIATE STANDING BALANCE FIRST 5 SECONDS: 2 - STEADY WITHOUT WALKER OR OTHER SUPPORT
EYES CLOSED AT MAXIMUM POSITION NUDGED: 1 - STEADY

## 2022-10-03 ENCOUNTER — HOME CARE VISIT (OUTPATIENT)
Dept: HOME HEALTH SERVICES | Facility: HOME HEALTHCARE | Age: 57
End: 2022-10-03
Payer: COMMERCIAL

## 2022-10-03 PROCEDURE — G0299 HHS/HOSPICE OF RN EA 15 MIN: HCPCS

## 2022-10-04 ENCOUNTER — HOME CARE VISIT (OUTPATIENT)
Dept: HOME HEALTH SERVICES | Facility: HOME HEALTHCARE | Age: 57
End: 2022-10-04
Payer: COMMERCIAL

## 2022-10-04 VITALS
RESPIRATION RATE: 18 BRPM | HEART RATE: 100 BPM | SYSTOLIC BLOOD PRESSURE: 128 MMHG | DIASTOLIC BLOOD PRESSURE: 90 MMHG | OXYGEN SATURATION: 98 % | TEMPERATURE: 98.1 F

## 2022-10-04 VITALS
RESPIRATION RATE: 18 BRPM | SYSTOLIC BLOOD PRESSURE: 124 MMHG | TEMPERATURE: 97.8 F | HEART RATE: 95 BPM | OXYGEN SATURATION: 98 % | DIASTOLIC BLOOD PRESSURE: 64 MMHG

## 2022-10-04 PROCEDURE — G0151 HHCP-SERV OF PT,EA 15 MIN: HCPCS

## 2022-10-04 ASSESSMENT — ENCOUNTER SYMPTOMS
PERSON REPORTING PAIN: PATIENT
LIMITED RANGE OF MOTION: 1
PAIN SEVERITY GOAL: 6/10
MUSCLE WEAKNESS: 1
HIGHEST PAIN SEVERITY IN PAST 24 HOURS: 6/10
SUBJECTIVE PAIN PROGRESSION: UNCHANGED
PAIN: 1
NAUSEA: NO
LOWEST PAIN SEVERITY IN PAST 24 HOURS: 5/10
PAIN LOCATION: LOWER BACK
VOMITING: NO

## 2022-10-04 NOTE — PROGRESS NOTES
"Ortho:  POD# 3    S: doing well    O: Dressing clean and dry, calf and thigh soft, stable distal edema, distal CMS intact    Blood pressure 116/60, pulse 100, temperature 36.9 °C (98.5 °F), resp. rate 18, height 1.6 m (5' 3\"), weight 100.7 kg (222 lb 0.1 oz), last menstrual period 05/17/2017, SpO2 95 %.    Recent Labs      03/21/18   0410  03/22/18   0437  03/23/18   0524   HEMOGLOBIN  8.0*  7.5*  7.5*   HEMATOCRIT  23.5*  22.0*  22.0*       No intake or output data in the 24 hours ending 03/23/18 0724      A:  Patient Active Problem List    Diagnosis Date Noted   • Mild intermittent asthma without complication 04/25/2012     Priority: High   • Status post right hip replacement 03/20/2018   • Disability of walking- due to severe bilateral hip DJD- needs bilat THR- placed on full disability 2/5/18-2/5/19 02/05/2018   • Uncomplicated opioid dependence (CMS-HCC)- pcp dr hackett to rx 01/31/2018   • Essential hypertension 01/31/2018   • Primary osteoarthritis of right hip- severe; THR tbd 3/20/18- DR JERRY 01/22/2018   • Lymphedema 10/17/2017   • Debility- FMLA through april 2018 10/12/2017   • Venous insufficiency 10/03/2017   • Colon cancer screening- needs 08/09/2017   • Chronic bilateral low back pain with bilateral sciatica- pain mgt 07/26/2017   • Obesity, morbid, BMI 40.0-49.9 (CMS-[facility])- s/p gastric bypass 10/21/2016   • Vitamin B 12 deficiency 06/02/2016   • Fibroids 08/13/2013       Doing well POD 3 from right VILMA    PLAN: transfer to SNF today  \  " Quality 431: Preventive Care And Screening: Unhealthy Alcohol Use - Screening: Patient not identified as an unhealthy alcohol user when screened for unhealthy alcohol use using a systematic screening method Detail Level: Detailed Quality 226: Preventive Care And Screening: Tobacco Use: Screening And Cessation Intervention: Patient screened for tobacco use and is an ex/non-smoker Quality 110: Preventive Care And Screening: Influenza Immunization: Influenza Immunization not Administered because Patient Refused.

## 2022-10-05 ENCOUNTER — HOME CARE VISIT (OUTPATIENT)
Dept: HOME HEALTH SERVICES | Facility: HOME HEALTHCARE | Age: 57
End: 2022-10-05
Payer: COMMERCIAL

## 2022-10-05 VITALS
TEMPERATURE: 97.8 F | RESPIRATION RATE: 19 BRPM | OXYGEN SATURATION: 96 % | DIASTOLIC BLOOD PRESSURE: 84 MMHG | HEART RATE: 120 BPM | SYSTOLIC BLOOD PRESSURE: 135 MMHG

## 2022-10-05 PROCEDURE — G0156 HHCP-SVS OF AIDE,EA 15 MIN: HCPCS

## 2022-10-05 PROCEDURE — G0299 HHS/HOSPICE OF RN EA 15 MIN: HCPCS

## 2022-10-05 ASSESSMENT — ENCOUNTER SYMPTOMS
HIGHEST PAIN SEVERITY IN PAST 24 HOURS: 7/10
PAIN SEVERITY GOAL: 1/10
PAIN: 1
PERSON REPORTING PAIN: PATIENT
LOWEST PAIN SEVERITY IN PAST 24 HOURS: 3/10
SUBJECTIVE PAIN PROGRESSION: WAXING AND WANING

## 2022-10-06 ENCOUNTER — TELEPHONE (OUTPATIENT)
Dept: MEDICAL GROUP | Age: 57
End: 2022-10-06
Payer: COMMERCIAL

## 2022-10-06 VITALS — HEART RATE: 92 BPM

## 2022-10-06 ASSESSMENT — ENCOUNTER SYMPTOMS
PAIN LOCATION - PAIN FREQUENCY: FREQUENT
LOWEST PAIN SEVERITY IN PAST 24 HOURS: 6/10
HIGHEST PAIN SEVERITY IN PAST 24 HOURS: 6/10
PAIN LOCATION - RELIEVING FACTORS: PAIN MEDS
PAIN SEVERITY GOAL: 6/10
LIMITED RANGE OF MOTION: 1
PAIN LOCATION - PAIN SEVERITY: 6/10
MUSCLE WEAKNESS: 1
PAIN LOCATION - PAIN QUALITY: ACHES
SUBJECTIVE PAIN PROGRESSION: UNCHANGED
PAIN LOCATION: BACK
VOMITING: DENIES
PERSON REPORTING PAIN: PATIENT
PAIN LOCATION - PAIN SEVERITY: 7/10
PAIN: 1
DYSPNEA ACTIVITY LEVEL: AFTER AMBULATING MORE THAN 20 FT
NAUSEA: DENIES
PAIN LOCATION: LOWER BACK
PAIN LOCATION - EXACERBATING FACTORS: MOVEMENT
SHORTNESS OF BREATH: 1

## 2022-10-06 NOTE — TELEPHONE ENCOUNTER
VOICEMAIL  1. Caller Name: Karine Ovalle                        Call Back Number: [phone] (home) [phone] (work)      2. Message: spoke with patient about her FMLA paperwork she let me that it was parcily approved states she will send me the other paper work she needs in order to go back to work , I let her know once I get it ill fill it out and send it to where it needs to be . Patient verbalized understanding     3. Patient approves office to leave a detailed voicemail/MyChart message: yes

## 2022-10-07 ENCOUNTER — HOME CARE VISIT (OUTPATIENT)
Dept: HOME HEALTH SERVICES | Facility: HOME HEALTHCARE | Age: 57
End: 2022-10-07
Payer: COMMERCIAL

## 2022-10-07 PROCEDURE — G0299 HHS/HOSPICE OF RN EA 15 MIN: HCPCS

## 2022-10-08 VITALS
OXYGEN SATURATION: 99 % | TEMPERATURE: 98.1 F | HEART RATE: 93 BPM | SYSTOLIC BLOOD PRESSURE: 132 MMHG | DIASTOLIC BLOOD PRESSURE: 70 MMHG | RESPIRATION RATE: 16 BRPM

## 2022-10-08 ASSESSMENT — ENCOUNTER SYMPTOMS
NAUSEA: DENIES
LOWEST PAIN SEVERITY IN PAST 24 HOURS: 0/10
SUBJECTIVE PAIN PROGRESSION: UNCHANGED
PERSON REPORTING PAIN: PATIENT
SUBJECTIVE PAIN PROGRESSION: RAPIDLY WORSENING
MUSCLE WEAKNESS: 1
PAIN SEVERITY GOAL: 7/10
MUSCLE WEAKNESS: 1
VOMITING: NO
SHORTNESS OF BREATH: 1
NAUSEA: NO
HIGHEST PAIN SEVERITY IN PAST 24 HOURS: 10/10
VOMITING: DENIES
PAIN: 1
LIMITED RANGE OF MOTION: 1
PERSON REPORTING PAIN: PATIENT
LOWEST PAIN SEVERITY IN PAST 24 HOURS: 0/10
PAIN SEVERITY GOAL: 6/10
HIGHEST PAIN SEVERITY IN PAST 24 HOURS: 6/10
LIMITED RANGE OF MOTION: 1
DYSPNEA ACTIVITY LEVEL: AFTER AMBULATING MORE THAN 20 FT
PAIN: 1

## 2022-10-10 ENCOUNTER — TELEPHONE (OUTPATIENT)
Dept: MEDICAL GROUP | Age: 57
End: 2022-10-10
Payer: COMMERCIAL

## 2022-10-10 ENCOUNTER — HOME CARE VISIT (OUTPATIENT)
Dept: HOME HEALTH SERVICES | Facility: HOME HEALTHCARE | Age: 57
End: 2022-10-10
Payer: COMMERCIAL

## 2022-10-10 VITALS
OXYGEN SATURATION: 96 % | SYSTOLIC BLOOD PRESSURE: 116 MMHG | DIASTOLIC BLOOD PRESSURE: 80 MMHG | TEMPERATURE: 97.8 F | RESPIRATION RATE: 18 BRPM | HEART RATE: 106 BPM

## 2022-10-10 PROCEDURE — G0299 HHS/HOSPICE OF RN EA 15 MIN: HCPCS

## 2022-10-10 PROCEDURE — G0151 HHCP-SERV OF PT,EA 15 MIN: HCPCS

## 2022-10-10 ASSESSMENT — ENCOUNTER SYMPTOMS
SUBJECTIVE PAIN PROGRESSION: WAXING AND WANING
HIGHEST PAIN SEVERITY IN PAST 24 HOURS: 8/10
PAIN LOCATION: BACK
PAIN SEVERITY GOAL: 0/10
PAIN LOCATION - PAIN QUALITY: ACHES
PERSON REPORTING PAIN: PATIENT
PAIN LOCATION - RELIEVING FACTORS: PAIN MED
PAIN LOCATION - EXACERBATING FACTORS: WALKING OR MOVEMENT
PAIN: 1
LOWEST PAIN SEVERITY IN PAST 24 HOURS: 3/10
PAIN LOCATION - PAIN SEVERITY: 7/10
PAIN LOCATION - PAIN FREQUENCY: FREQUENT

## 2022-10-10 ASSESSMENT — BALANCE ASSESSMENTS
BALANCE SCORE: 12
ARISES: 1 - ABLE, USES ARMS TO HELP
TURNING 360 DEGREES STEPS: 1 - CONTINUOUS STEPS
NUDGED SCORE: 2
ARISING SCORE: 1
EYES CLOSED AT MAXIMUM POSITION NUDGED: 0 - UNSTEADY
IMMEDIATE STANDING BALANCE FIRST 5 SECONDS: 2 - STEADY WITHOUT WALKER OR OTHER SUPPORT
NUDGED: 2 - STEADY
SITTING BALANCE: 1 - STEADY, SAFE
ATTEMPTS TO ARISE: 2 - ABLE TO RISE, ONE ATTEMPT
SITTING DOWN: 1 - USES ARMS OR NOT SMOOTH MOTION
STANDING BALANCE: 1 - STEADY BUT WIDE STANCE AND USES CANE OR OTHER SUPPORT

## 2022-10-10 ASSESSMENT — GAIT ASSESSMENTS
TRUNK SCORE: 0
PATH SCORE: 1
GAIT SCORE: 7
STEP SYMMETRY: 1 - RIGHT AND LEFT STEP LENGTH APPEAR EQUAL
INITIATION OF GAIT IMMEDIATELY AFTER GO: 1 - NO HESITANCY
BALANCE AND GAIT SCORE: 19
TRUNK: 0 - MARKED SWAY OR USES WALKING AID
PATH: 1 - MILD/MODERATE DEVIATION OR USES WALKING AID
WALKING STANCE: 0 - HEELS APART
STEP CONTINUITY: 0 - STOPPING OR DISCONTINUITY BETWEEN STEPS

## 2022-10-10 NOTE — TELEPHONE ENCOUNTER
VOICEMAIL  1. Caller Name: Karine Ovalle                        Call Back Number: [phone] (home) [phone] (work)      2. Message: Received patient message on paperwork she needed filled out , spoke with patient on the phone let her know I received it and fill it out and fax it back today patient verbalized understanding     3. Patient approves office to leave a detailed voicemail/MyChart message: N\A

## 2022-10-11 ASSESSMENT — ENCOUNTER SYMPTOMS
DYSPNEA ACTIVITY LEVEL: AFTER AMBULATING MORE THAN 20 FT
SHORTNESS OF BREATH: 1
HIGHEST PAIN SEVERITY IN PAST 24 HOURS: 8/10
NAUSEA: NO
LOWEST PAIN SEVERITY IN PAST 24 HOURS: 7/10
LIMITED RANGE OF MOTION: 1
MUSCLE WEAKNESS: 1
VOMITING: NO
PAIN SEVERITY GOAL: 7/10
SUBJECTIVE PAIN PROGRESSION: UNCHANGED

## 2022-10-12 ENCOUNTER — HOME CARE VISIT (OUTPATIENT)
Dept: HOME HEALTH SERVICES | Facility: HOME HEALTHCARE | Age: 57
End: 2022-10-12

## 2022-10-12 VITALS
OXYGEN SATURATION: 95 % | DIASTOLIC BLOOD PRESSURE: 73 MMHG | HEART RATE: 104 BPM | SYSTOLIC BLOOD PRESSURE: 125 MMHG | RESPIRATION RATE: 18 BRPM | TEMPERATURE: 97.3 F

## 2022-10-12 PROCEDURE — G0156 HHCP-SVS OF AIDE,EA 15 MIN: HCPCS

## 2022-10-12 PROCEDURE — G0299 HHS/HOSPICE OF RN EA 15 MIN: HCPCS

## 2022-10-14 ENCOUNTER — HOME CARE VISIT (OUTPATIENT)
Dept: HOME HEALTH SERVICES | Facility: HOME HEALTHCARE | Age: 57
End: 2022-10-14
Payer: COMMERCIAL

## 2022-10-14 VITALS
TEMPERATURE: 97.5 F | SYSTOLIC BLOOD PRESSURE: 123 MMHG | RESPIRATION RATE: 18 BRPM | DIASTOLIC BLOOD PRESSURE: 75 MMHG | OXYGEN SATURATION: 95 % | HEART RATE: 90 BPM

## 2022-10-14 PROCEDURE — G0299 HHS/HOSPICE OF RN EA 15 MIN: HCPCS

## 2022-10-14 PROCEDURE — G0156 HHCP-SVS OF AIDE,EA 15 MIN: HCPCS

## 2022-10-15 ASSESSMENT — ENCOUNTER SYMPTOMS
PAIN: 1
HIGHEST PAIN SEVERITY IN PAST 24 HOURS: 8/10
PAIN LOCATION - PAIN QUALITY: ACHY
PAIN LOCATION: BACK
LIMITED RANGE OF MOTION: 1
VOMITING: NO
PAIN LOCATION - PAIN SEVERITY: 8/10
LOWEST PAIN SEVERITY IN PAST 24 HOURS: 7/10
PAIN SEVERITY GOAL: 7/10
NAUSEA: NON
SUBJECTIVE PAIN PROGRESSION: UNCHANGED
PAIN LOCATION - PAIN DURATION: ALL THE TIME
PAIN LOCATION - EXACERBATING FACTORS: PHYSICAL ACTIVITY
PERSON REPORTING PAIN: PATIENT
MUSCLE WEAKNESS: 1
PAIN LOCATION - RELIEVING FACTORS: RESTING
PAIN LOCATION - PAIN FREQUENCY: CONSTANT

## 2022-10-17 ENCOUNTER — HOME CARE VISIT (OUTPATIENT)
Dept: HOME HEALTH SERVICES | Facility: HOME HEALTHCARE | Age: 57
End: 2022-10-17
Payer: COMMERCIAL

## 2022-10-17 PROCEDURE — G0299 HHS/HOSPICE OF RN EA 15 MIN: HCPCS

## 2022-10-18 VITALS — OXYGEN SATURATION: 96 %

## 2022-10-18 ASSESSMENT — ENCOUNTER SYMPTOMS
PAIN LOCATION - PAIN QUALITY: ACHY
PAIN SEVERITY GOAL: 7/10
VOMITING: NO
PAIN: 1
SUBJECTIVE PAIN PROGRESSION: RAPIDLY IMPROVING
PAIN LOCATION - RELIEVING FACTORS: RESTING
PAIN LOCATION - PAIN SEVERITY: 5/10
PAIN LOCATION - PAIN QUALITY: ACHY
PAIN LOCATION - EXACERBATING FACTORS: PHYSICAL ACTIVITIES
PAIN LOCATION - PAIN SEVERITY: 6/10
MUSCLE WEAKNESS: 1
PERSON REPORTING PAIN: PATIENT
NAUSEA: NO
PAIN: 1
PAIN LOCATION - EXACERBATING FACTORS: MOVEMENT
PAIN LOCATION - PAIN DURATION: ON AND OFF
SHORTNESS OF BREATH: 1
PAIN LOCATION - PAIN FREQUENCY: INTERMITTENT
HIGHEST PAIN SEVERITY IN PAST 24 HOURS: 6/10
VOMITING: NO
HIGHEST PAIN SEVERITY IN PAST 24 HOURS: 5/10
LOWEST PAIN SEVERITY IN PAST 24 HOURS: 0/10
LIMITED RANGE OF MOTION: 1
SUBJECTIVE PAIN PROGRESSION: UNCHANGED
MUSCLE WEAKNESS: 1
PAIN LOCATION - PAIN DURATION: ON AND OFF
LIMITED RANGE OF MOTION: 1
PAIN LOCATION - PAIN FREQUENCY: INTERMITTENT
PAIN SEVERITY GOAL: 7/10
PAIN LOCATION: LOWER BACK
LOWEST PAIN SEVERITY IN PAST 24 HOURS: 0/10
PERSON REPORTING PAIN: PATIENT
DYSPNEA ACTIVITY LEVEL: AFTER AMBULATING MORE THAN 20 FT
ASSOCIATED SYMPTOMS: NO ASSOCIATED SYMPTOMS
NAUSEA: NO

## 2022-10-19 ENCOUNTER — HOME CARE VISIT (OUTPATIENT)
Dept: HOME HEALTH SERVICES | Facility: HOME HEALTHCARE | Age: 57
End: 2022-10-19
Payer: COMMERCIAL

## 2022-10-19 PROCEDURE — G0299 HHS/HOSPICE OF RN EA 15 MIN: HCPCS

## 2022-10-20 ENCOUNTER — TELEMEDICINE (OUTPATIENT)
Dept: MEDICAL GROUP | Age: 57
End: 2022-10-20
Payer: COMMERCIAL

## 2022-10-20 VITALS
DIASTOLIC BLOOD PRESSURE: 70 MMHG | TEMPERATURE: 97.8 F | OXYGEN SATURATION: 97 % | SYSTOLIC BLOOD PRESSURE: 124 MMHG | RESPIRATION RATE: 16 BRPM | HEART RATE: 91 BPM

## 2022-10-20 DIAGNOSIS — E66.09 CLASS 1 OBESITY DUE TO EXCESS CALORIES WITH SERIOUS COMORBIDITY AND BODY MASS INDEX (BMI) OF 33.0 TO 33.9 IN ADULT: ICD-10-CM

## 2022-10-20 DIAGNOSIS — I10 ESSENTIAL HYPERTENSION: ICD-10-CM

## 2022-10-20 DIAGNOSIS — Z02.9 ADMINISTRATIVE ENCOUNTER: ICD-10-CM

## 2022-10-20 DIAGNOSIS — L03.115 CELLULITIS OF RIGHT LOWER EXTREMITY: ICD-10-CM

## 2022-10-20 DIAGNOSIS — G95.9 CERVICAL MYELOPATHY (HCC): ICD-10-CM

## 2022-10-20 DIAGNOSIS — S81.801A OPEN WOUND OF RIGHT LOWER EXTREMITY, INITIAL ENCOUNTER: ICD-10-CM

## 2022-10-20 DIAGNOSIS — R60.0 LEG EDEMA: ICD-10-CM

## 2022-10-20 PROBLEM — E66.811 CLASS 1 OBESITY DUE TO EXCESS CALORIES WITH SERIOUS COMORBIDITY AND BODY MASS INDEX (BMI) OF 33.0 TO 33.9 IN ADULT: Status: ACTIVE | Noted: 2018-06-07

## 2022-10-20 PROBLEM — J45.40 MODERATE PERSISTENT ASTHMA WITHOUT COMPLICATION: Status: ACTIVE | Noted: 2022-06-01

## 2022-10-20 PROBLEM — Z96.643 S/P HIP REPLACEMENT, BILATERAL: Status: RESOLVED | Noted: 2019-02-13 | Resolved: 2022-10-20
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
## 2022-10-20 PROBLEM — R60.9 EDEMA: Status: RESOLVED | Noted: 2018-12-13 | Resolved: 2022-10-20

## 2022-10-20 PROBLEM — S01.81XA FACIAL LACERATION: Status: RESOLVED | Noted: 2022-06-08 | Resolved: 2022-10-20

## 2022-10-20 PROBLEM — R55 NEAR SYNCOPE: Status: RESOLVED | Noted: 2022-06-01 | Resolved: 2022-10-20

## 2022-10-20 PROBLEM — D63.8 ANEMIA, CHRONIC DISEASE: Status: RESOLVED | Noted: 2022-06-02 | Resolved: 2022-10-20

## 2022-10-20 PROBLEM — D72.829 LEUKOCYTOSIS: Status: RESOLVED | Noted: 2022-04-03 | Resolved: 2022-10-20

## 2022-10-20 PROBLEM — E87.6 HYPOKALEMIA: Status: RESOLVED | Noted: 2018-06-07 | Resolved: 2022-10-20

## 2022-10-20 PROBLEM — D50.9 MICROCYTIC ANEMIA: Status: RESOLVED | Noted: 2018-12-13 | Resolved: 2022-10-20

## 2022-10-20 PROBLEM — M25.552 LEFT HIP PAIN: Status: RESOLVED | Noted: 2018-09-18 | Resolved: 2022-10-20

## 2022-10-20 PROBLEM — A41.9 SEPSIS (HCC): Status: RESOLVED | Noted: 2022-04-03 | Resolved: 2022-10-20

## 2022-10-20 PROBLEM — S02.92XA MULTIPLE CLOSED FRACTURES OF FACIAL BONE (HCC): Status: RESOLVED | Noted: 2022-06-01 | Resolved: 2022-10-20

## 2022-10-20 PROCEDURE — 99214 OFFICE O/P EST MOD 30 MIN: CPT | Mod: 95 | Performed by: INTERNAL MEDICINE

## 2022-10-20 ASSESSMENT — ENCOUNTER SYMPTOMS
PAIN: 1
PAIN SEVERITY GOAL: 6/10
SUBJECTIVE PAIN PROGRESSION: WAXING AND WANING
PAIN LOCATION - PAIN QUALITY: ACHY
PAIN LOCATION - EXACERBATING FACTORS: PHYSICAL ACTIVITY
HIGHEST PAIN SEVERITY IN PAST 24 HOURS: 5/10
LIMITED RANGE OF MOTION: 1
NAUSEA: NO
VOMITING: NO
PERSON REPORTING PAIN: PATIENT
PAIN LOCATION - PAIN SEVERITY: 5/10
PAIN LOCATION - RELIEVING FACTORS: RESTING, DISTRACTION
LOWEST PAIN SEVERITY IN PAST 24 HOURS: 0/10
PAIN LOCATION - PAIN FREQUENCY: FREQUENT
MUSCLE WEAKNESS: 1

## 2022-10-20 NOTE — PROGRESS NOTES
Virtual Visit: Established Patient   This visit was conducted via Zoom using secure and encrypted videoconferencing technology.   The patient was in their home in the [facility].    The patient's identity was confirmed and verbal consent was obtained for this virtual visit.     Subjective:   CC: The patient is here for followup of chronic medical problems listed below. The patient is compliant with medications and having no side effects from them. Denies chest pain, abdominal pain, dyspnea, myalgias, or cough.   Patient Active Problem List   Diagnosis    Vitamin B 12 deficiency    Obesity (BMI 30-39.9)    Chronic bilateral low back pain with bilateral sciatica- pain mgt;    spine nv    Venous insufficiency    Lymphedema    Uncomplicated opioid dependence (CMS-HCC)- spine nv    Essential hypertension    DDD (degenerative disc disease), lumbar    Primary osteoarthritis of both hips- dr nowak; left THR done feb 6, 2019; right THR 3/2018    Class 1 obesity due to excess calories with serious comorbidity and body mass index (BMI) of 33.0 to 33.9 in adult- rx phentermine    DDD (degenerative disc disease), cervical- MOD-SEVERE SPINE [place]- dr brennan; fusion and laminectomy C3-T1 12/5/2018 dr brennan    Encounter for work capability assessment- [facility] PAPERWORK    Cervical myelopathy (HCC)    Vitamin D deficiency    Primary insomnia    Mixed stress and urge urinary incontinence    Primary osteoarthritis of both knees- sp right TKR 2015; left TKR tbd 2021 or 2022- dr nowak    Administrative ohkcdhuht-cayfac-ke [facility] paperwork for workplace accommodation for chronic DDD and DJD hips and shoulders bilaterally status post multiple joint replacements    Leg edema    Cellulitis of right lower extremity    Open wound of right lower extremity- needs wound vac    Chronic pain syndrome    Moderate persistent asthma without complication    Serum gamma globulin increased    Hypomagnesemia   10/20/2022  Outpatient Medications Prior to  Visit   Medication Sig Dispense Refill    gabapentin (NEURONTIN) 800 MG tablet Take 1 Tablet by mouth 3 times a day. 360 Tablet 3    phentermine 37.5 MG capsule Take 1 Capsule by mouth every morning for 90 days. 90 Capsule 1    methocarbamol (ROBAXIN) 500 MG Tab Take 2 Tablets by mouth 4 times a day as needed (back pain and spasms). Indications: Musculoskeletal Pain 240 Tablet 5    zinc sulfate (ZINCATE) 220 (50 Zn) MG Cap Take 220 mg by mouth every day.      morphine (MS IR) 30 MG tablet Take 30 mg by mouth every 12 hours.      Multiple Vitamin (MULTIVITAMIN ADULT) Tab Take 1 Tablet by mouth every day.      fluticasone (FLOVENT HFA) 44 MCG/ACT Aerosol Inhale 2 Puffs 2 times a day as needed (tobacco smoke). Everyday maintenance steroid inhaler 1 Each 11    ascorbic acid (VITAMIN C) 500 MG tablet Take 1 Tablet by mouth 2 times a day. 100 Tablet 4    magnesium oxide 400 (240 Mg) MG Tab Take 1 Tablet by mouth 2 times a day. 30 Tablet     furosemide (LASIX) 40 MG Tab Take 1 Tablet by mouth every day. Indications: Edema 90 Tablet 4    oxyCODONE-acetaminophen (PERCOCET-10)  MG Tab Take 1 Tablet by mouth every 6 hours as needed for Moderate Pain or Severe Pain.  0    CALCIUM CARBONATE-VITAMIN D PO Take 1 Tablet by mouth every day. Indications: Low Amount of Calcium in the Blood      ferrous sulfate 325 (65 Fe) MG tablet Take 1 Tab by mouth every morning with breakfast. 30 Tab 1     No facility-administered medications prior to visit.     Allergies   Allergen Reactions    Tobacco [Nicotiana Tabacum] Shortness of Breath     Cigarette smoke causes SOB, rispatory issues     No visits with results within 1 Month(s) from this visit.   Latest known visit with results is:   Hospital Outpatient Visit on 09/13/2022   Component Date Value    Sodium 09/13/2022 137     Potassium 09/13/2022 4.0     Chloride 09/13/2022 103     Co2 09/13/2022 24     Anion Gap 09/13/2022 10.0     Glucose 09/13/2022 100 (A)     Bun 09/13/2022 9      Creatinine 09/13/2022 0.61     Calcium 09/13/2022 9.2     AST(SGOT) 09/13/2022 25     ALT(SGPT) 09/13/2022 12     Alkaline Phosphatase 09/13/2022 144 (A)     Total Bilirubin 09/13/2022 0.3     Albumin 09/13/2022 3.5     Total Protein 09/13/2022 7.5     Globulin 09/13/2022 4.0 (A)     A-G Ratio 09/13/2022 0.9     WBC 09/13/2022 5.8     RBC 09/13/2022 4.42     Hemoglobin 09/13/2022 12.4     Hematocrit 09/13/2022 36.1 (A)     MCV 09/13/2022 81.7     MCH 09/13/2022 28.1     MCHC 09/13/2022 34.3     RDW 09/13/2022 41.2     Platelet Count 09/13/2022 419     MPV 09/13/2022 9.8     Neutrophils-Polys 09/13/2022 48.40     Lymphocytes 09/13/2022 38.40     Monocytes 09/13/2022 8.00     Eosinophils 09/13/2022 4.30     Basophils 09/13/2022 0.70     Immature Granulocytes 09/13/2022 0.20     Nucleated RBC 09/13/2022 0.00     Neutrophils (Absolute) 09/13/2022 2.83     Lymphs (Absolute) 09/13/2022 2.24     Monos (Absolute) 09/13/2022 0.47     Eos (Absolute) 09/13/2022 0.25     Baso (Absolute) 09/13/2022 0.04     Immature Granulocytes (a* 09/13/2022 0.01     NRBC (Absolute) 09/13/2022 0.00     Sed Rate Westergren 09/13/2022 17     Stat C-Reactive Protein 09/13/2022 1.72 (A)     GFR (CKD-EPI) 09/13/2022 104              Karine Ovalle is a 57 y.o. female presenting for evaluation and management of:         ROS        Current medicines (including changes today)  Current Outpatient Medications   Medication Sig Dispense Refill    gabapentin (NEURONTIN) 800 MG tablet Take 1 Tablet by mouth 3 times a day. 360 Tablet 3    phentermine 37.5 MG capsule Take 1 Capsule by mouth every morning for 90 days. 90 Capsule 1    methocarbamol (ROBAXIN) 500 MG Tab Take 2 Tablets by mouth 4 times a day as needed (back pain and spasms). Indications: Musculoskeletal Pain 240 Tablet 5    zinc sulfate (ZINCATE) 220 (50 Zn) MG Cap Take 220 mg by mouth every day.      morphine (MS IR) 30 MG tablet Take 30 mg by mouth every 12 hours.      Multiple Vitamin  (MULTIVITAMIN ADULT) Tab Take 1 Tablet by mouth every day.      fluticasone (FLOVENT HFA) 44 MCG/ACT Aerosol Inhale 2 Puffs 2 times a day as needed (tobacco smoke). Everyday maintenance steroid inhaler 1 Each 11    ascorbic acid (VITAMIN C) 500 MG tablet Take 1 Tablet by mouth 2 times a day. 100 Tablet 4    magnesium oxide 400 (240 Mg) MG Tab Take 1 Tablet by mouth 2 times a day. 30 Tablet     furosemide (LASIX) 40 MG Tab Take 1 Tablet by mouth every day. Indications: Edema 90 Tablet 4    oxyCODONE-acetaminophen (PERCOCET-10)  MG Tab Take 1 Tablet by mouth every 6 hours as needed for Moderate Pain or Severe Pain.  0    CALCIUM CARBONATE-VITAMIN D PO Take 1 Tablet by mouth every day. Indications: Low Amount of Calcium in the Blood      ferrous sulfate 325 (65 Fe) MG tablet Take 1 Tab by mouth every morning with breakfast. 30 Tab 1     No current facility-administered medications for this visit.       Patient Active Problem List    Diagnosis Date Noted    Hypomagnesemia 06/11/2022    Serum gamma globulin increased 06/08/2022    Chronic pain syndrome 06/01/2022    Moderate persistent asthma without complication 06/01/2022    Open wound of right lower extremity- needs wound vac 04/04/2022    Cellulitis of right lower extremity 12/31/2021    Leg edema 08/09/2021    Administrative vyadexbxp-hrnggu-ii [facility] paperwork for workplace accommodation for chronic DDD and DJD hips and shoulders bilaterally status post multiple joint replacements 06/23/2021    Primary osteoarthritis of both knees- sp right TKR 2015; left TKR tbd 2021 or 2022- dr nowak 06/04/2019    Primary insomnia 01/03/2019    Mixed stress and urge urinary incontinence 01/03/2019    Vitamin D deficiency 12/13/2018    Cervical myelopathy (HCC) 12/11/2018    Encounter for work capability assessment- [employer] 08/29/2018    DDD (degenerative disc disease), cervical- MOD-SEVERE SPINE NV- dr brennan; fusion and laminectomy C3-T1 12/5/2018 dr brennan 08/09/2018     Primary osteoarthritis of both hips- dr nowak; left THR done feb 6, 2019; right THR 3/2018 06/07/2018    Class 1 obesity due to excess calories with serious comorbidity and body mass index (BMI) of 33.0 to 33.9 in adult- rx phentermine 06/07/2018    DDD (degenerative disc disease), lumbar 04/25/2018    Uncomplicated opioid dependence (CMS-HCC)- spine nv 01/31/2018    Essential hypertension 01/31/2018    Lymphedema 10/17/2017    Venous insufficiency 10/03/2017    Chronic bilateral low back pain with bilateral sciatica- pain mgt;    spine nv 07/26/2017    Obesity (BMI 30-39.9) 12/02/2016    Vitamin B 12 deficiency 06/02/2016        Objective:   LMP  (LMP Unknown)     Physical Exam:          Constitutional: Alert, no distress, well-groomed.  Skin: No rashes in visible areas.  Eye: Round. Conjunctiva clear, lids normal. No icterus.   ENMT: Lips pink without lesions, good dentition, moist mucous membranes. Phonation normal.  Neck: No masses, no thyromegaly. Moves freely without pain.  Respiratory: Unlabored respiratory effort, no cough or audible wheeze  Psych: Alert and oriented x3, normal affect and mood.     Assessment and Plan:   The following treatment plan was discussed:     1. Class 1 obesity due to excess calories with serious comorbidity and body mass index (BMI) of 33.0 to 33.9 in adult- rx phentermine  Under good control. Continue same regimen.      2. Open wound of right lower extremity, initial encounter- needs wounjd vac  - Referral to Wound Clinic    3. Leg edema  Cont lasix  4. Administrative wvwpdbona-grpoaw-gw [facility] paperwork for workplace accommodation for chronic DDD and DJD hips and shoulders bilaterally status post multiple joint replacements    5. Cellulitis of right lower extremity  - Referral to Wound Clinic asap  6. Essential hypertension  Under good control. Continue same regimen.    7. Cervical myelopathy (HCC)  Under good control. Continue same regimen.      Follow-up: Return in about 4  weeks (around 11/17/2022) for edma, disability, obesity, phentermine, wound care.

## 2022-10-21 ENCOUNTER — HOME CARE VISIT (OUTPATIENT)
Dept: HOME HEALTH SERVICES | Facility: HOME HEALTHCARE | Age: 57
End: 2022-10-21
Payer: COMMERCIAL

## 2022-10-21 PROCEDURE — G0299 HHS/HOSPICE OF RN EA 15 MIN: HCPCS

## 2022-10-23 VITALS
SYSTOLIC BLOOD PRESSURE: 124 MMHG | TEMPERATURE: 97.8 F | OXYGEN SATURATION: 97 % | RESPIRATION RATE: 18 BRPM | HEART RATE: 92 BPM | DIASTOLIC BLOOD PRESSURE: 68 MMHG

## 2022-10-23 ASSESSMENT — ENCOUNTER SYMPTOMS
PERSON REPORTING PAIN: PATIENT
SUBJECTIVE PAIN PROGRESSION: UNCHANGED
LIMITED RANGE OF MOTION: 1
NAUSEA: NO
VOMITING: NO
LOWEST PAIN SEVERITY IN PAST 24 HOURS: 0/10
PAIN: 1
MUSCLE WEAKNESS: 1
PAIN SEVERITY GOAL: 6/10
HIGHEST PAIN SEVERITY IN PAST 24 HOURS: 6/10

## 2022-10-24 ENCOUNTER — HOME CARE VISIT (OUTPATIENT)
Dept: HOME HEALTH SERVICES | Facility: HOME HEALTHCARE | Age: 57
End: 2022-10-24
Payer: COMMERCIAL

## 2022-10-24 PROCEDURE — G0299 HHS/HOSPICE OF RN EA 15 MIN: HCPCS

## 2022-10-26 ENCOUNTER — HOME CARE VISIT (OUTPATIENT)
Dept: HOME HEALTH SERVICES | Facility: HOME HEALTHCARE | Age: 57
End: 2022-10-26
Payer: COMMERCIAL

## 2022-10-26 PROCEDURE — G0493 RN CARE EA 15 MIN HH/HOSPICE: HCPCS

## 2022-10-26 ASSESSMENT — ENCOUNTER SYMPTOMS: DEBILITATING PAIN: 1

## 2022-10-27 VITALS
OXYGEN SATURATION: 97 % | TEMPERATURE: 98.2 F | RESPIRATION RATE: 18 BRPM | SYSTOLIC BLOOD PRESSURE: 130 MMHG | HEART RATE: 94 BPM | DIASTOLIC BLOOD PRESSURE: 76 MMHG

## 2022-10-27 ASSESSMENT — ENCOUNTER SYMPTOMS
PAIN LOCATION - RELIEVING FACTORS: RESTING
LOWEST PAIN SEVERITY IN PAST 24 HOURS: 0/10
LIMITED RANGE OF MOTION: 1
PAIN LOCATION - PAIN FREQUENCY: INTERMITTENT
MUSCLE WEAKNESS: 1
NAUSEA: NO
HIGHEST PAIN SEVERITY IN PAST 24 HOURS: 5/10
PAIN SEVERITY GOAL: 0/10
SUBJECTIVE PAIN PROGRESSION: UNCHANGED
PERSON REPORTING PAIN: PATIENT
VOMITING: NO
PAIN LOCATION - PAIN DURATION: HOURS
PAIN LOCATION - PAIN SEVERITY: 5/10
PAIN LOCATION - EXACERBATING FACTORS: PHYSICAL ACTIVITY
PAIN: 1
PAIN LOCATION - PAIN QUALITY: ACHY
PAIN LOCATION: KNEES

## 2022-10-28 ENCOUNTER — HOME CARE VISIT (OUTPATIENT)
Dept: HOME HEALTH SERVICES | Facility: HOME HEALTHCARE | Age: 57
End: 2022-10-28
Payer: COMMERCIAL

## 2022-10-28 PROCEDURE — G0299 HHS/HOSPICE OF RN EA 15 MIN: HCPCS

## 2022-10-28 PROCEDURE — 665003 FOLLOW UP-HOME HEALTH

## 2022-10-29 RX ORDER — FUROSEMIDE 40 MG/1
40 TABLET ORAL
Qty: 90 TABLET | Refills: 4 | Status: SHIPPED | OUTPATIENT
Start: 2022-10-29

## 2022-10-30 VITALS
OXYGEN SATURATION: 98 % | RESPIRATION RATE: 18 BRPM | HEIGHT: 63 IN | SYSTOLIC BLOOD PRESSURE: 130 MMHG | TEMPERATURE: 97.6 F | BODY MASS INDEX: 33.83 KG/M2 | DIASTOLIC BLOOD PRESSURE: 74 MMHG | HEART RATE: 93 BPM

## 2022-10-30 ASSESSMENT — ENCOUNTER SYMPTOMS
NAUSEA: NO
SUBJECTIVE PAIN PROGRESSION: UNCHANGED
SHORTNESS OF BREATH: 1
PAIN LOCATION - PAIN QUALITY: ACHY
VOMITING: NO
DYSPNEA ACTIVITY LEVEL: AFTER AMBULATING MORE THAN 20 FT
LOWEST PAIN SEVERITY IN PAST 24 HOURS: 5/10
HIGHEST PAIN SEVERITY IN PAST 24 HOURS: 6/10
PAIN: 1
PAIN LOCATION - PAIN FREQUENCY: INTERMITTENT
PAIN LOCATION - PAIN SEVERITY: 6/10
PERSON REPORTING PAIN: PATIENT
PAIN LOCATION - PAIN DURATION: ON AND OFF
PAIN SEVERITY GOAL: 6/10

## 2022-10-30 ASSESSMENT — ACTIVITIES OF DAILY LIVING (ADL): OASIS_M1830: 02

## 2022-10-30 ASSESSMENT — PATIENT HEALTH QUESTIONNAIRE - PHQ9: CLINICAL INTERPRETATION OF PHQ2 SCORE: 0

## 2022-10-31 ENCOUNTER — DOCUMENTATION (OUTPATIENT)
Dept: MEDICAL GROUP | Facility: PHYSICIAN GROUP | Age: 57
End: 2022-10-31
Payer: COMMERCIAL

## 2022-10-31 ENCOUNTER — HOME CARE VISIT (OUTPATIENT)
Dept: HOME HEALTH SERVICES | Facility: HOME HEALTHCARE | Age: 57
End: 2022-10-31
Payer: COMMERCIAL

## 2022-10-31 PROCEDURE — G0299 HHS/HOSPICE OF RN EA 15 MIN: HCPCS

## 2022-10-31 NOTE — PROGRESS NOTES
Medication chart review for [facility] services    Received referral from [facility].   Medications reviewed  compared with discharge summary if available.    Current medication list per [facility]     Current Outpatient Medications:     furosemide, 40 mg, Oral, QDAY PRN    morphine ER, 15 mg, Oral, Q12HRS    potassium Chloride ER, 20 mEq, Oral, QDAY PRN    vitamin D3, 1,000 Units, Oral, DAILY    Biotin, 1 Capsule, Oral, DAILY    gabapentin, 800 mg, Oral, TID    phentermine, 37.5 mg, Oral, QAM    methocarbamol, 1,000 mg, Oral, 4X/DAY PRN    zinc sulfate, 220 mg, Oral, DAILY    Multivitamin Adult, 1 Tablet, Oral, DAILY    fluticasone, 2 Puff, Inhalation, BID PRN    ascorbic acid, 500 mg, Oral, BID    magnesium oxide, 400 mg, Oral, BID    oxyCODONE-acetaminophen, 1 Tablet, Oral, Q6HRS PRN    CALCIUM CARBONATE-VITAMIN D PO, 1 Tablet, Oral, DAILY    ferrous sulfate, 325 mg, Oral, QDAY with Breakfast    Location of hospital, and discharge summary date, if applicable:       Allergies   Allergen Reactions    Tobacco [Nicotiana Tabacum] Shortness of Breath     Cigarette smoke causes SOB, rispatory issues       Labs     Lab Results   Component Value Date/Time    SODIUM 137 09/13/2022 02:40 PM    POTASSIUM 4.0 09/13/2022 02:40 PM    CHLORIDE 103 09/13/2022 02:40 PM    CO2 24 09/13/2022 02:40 PM    GLUCOSE 100 (H) 09/13/2022 02:40 PM    BUN 9 09/13/2022 02:40 PM    CREATININE 0.61 09/13/2022 02:40 PM    CREATININE 0.88 08/14/2010 12:00 AM    BUNCREATRAT 13 08/14/2010 12:00 AM    GLOMRATE >59 08/14/2010 12:00 AM     Lab Results   Component Value Date/Time    ALKPHOSPHAT 144 (H) 09/13/2022 02:40 PM    ASTSGOT 25 09/13/2022 02:40 PM    ALTSGPT 12 09/13/2022 02:40 PM    TBILIRUBIN 0.3 09/13/2022 02:40 PM    INR 1.25 (H) 06/01/2022 03:30 PM    ALBUMIN 3.5 09/13/2022 02:40 PM    ALBUMIN 2.03 (L) 06/04/2022 06:14 AM        Assessment for clinically significant drug interactions, drug omissions/additions, duplicative  therapies.            CC   Micky Rubin M.D.  [address] [place] [address]  Fax: [phone]    [facility]  Phone [phone] fax [phone]    This note was created using voice recognition software (Dragon). The accuracy of the dictation is limited by the abilities of the software. I have reviewed the note prior to signing, however some errors in grammar and context are still possible. If you have any questions related to this note please do not hesitate to contact our office.

## 2022-11-02 ENCOUNTER — HOME CARE VISIT (OUTPATIENT)
Dept: HOME HEALTH SERVICES | Facility: HOME HEALTHCARE | Age: 57
End: 2022-11-02
Payer: COMMERCIAL

## 2022-11-02 PROCEDURE — G0299 HHS/HOSPICE OF RN EA 15 MIN: HCPCS

## 2022-11-03 ENCOUNTER — TELEPHONE (OUTPATIENT)
Dept: MEDICAL GROUP | Age: 57
End: 2022-11-03
Payer: COMMERCIAL

## 2022-11-03 ENCOUNTER — HOME CARE VISIT (OUTPATIENT)
Dept: HOME HEALTH SERVICES | Facility: HOME HEALTHCARE | Age: 57
End: 2022-11-03
Payer: COMMERCIAL

## 2022-11-03 VITALS
OXYGEN SATURATION: 97 % | HEART RATE: 95 BPM | TEMPERATURE: 98 F | RESPIRATION RATE: 16 BRPM | TEMPERATURE: 98.2 F | SYSTOLIC BLOOD PRESSURE: 128 MMHG | SYSTOLIC BLOOD PRESSURE: 130 MMHG | DIASTOLIC BLOOD PRESSURE: 72 MMHG | HEART RATE: 94 BPM | RESPIRATION RATE: 16 BRPM | DIASTOLIC BLOOD PRESSURE: 76 MMHG | OXYGEN SATURATION: 98 %

## 2022-11-03 DIAGNOSIS — S81.801A OPEN WOUND OF RIGHT LOWER EXTREMITY, INITIAL ENCOUNTER: ICD-10-CM

## 2022-11-03 ASSESSMENT — ENCOUNTER SYMPTOMS
PAIN SEVERITY GOAL: 6/10
PAIN SEVERITY GOAL: 6/10
PERSON REPORTING PAIN: PATIENT
PAIN: 1
PAIN LOCATION - PAIN SEVERITY: 6/10
PAIN: 1
PAIN LOCATION - PAIN FREQUENCY: INTERMITTENT
NAUSEA: NO
VOMITING: NO
PAIN LOCATION: LOWER BACK, KNEES
PERSON REPORTING PAIN: PATIENT
NAUSEA: NO
PAIN LOCATION - PAIN QUALITY: ACHY
PAIN LOCATION - PAIN DURATION: ON AND OFF
LOWEST PAIN SEVERITY IN PAST 24 HOURS: 5/10
VOMITING: NO
PAIN LOCATION - RELIEVING FACTORS: RESTING, REPOSITION
LIMITED RANGE OF MOTION: 1
LOWEST PAIN SEVERITY IN PAST 24 HOURS: 6/10
HIGHEST PAIN SEVERITY IN PAST 24 HOURS: 6/10
SUBJECTIVE PAIN PROGRESSION: UNCHANGED
PAIN LOCATION - EXACERBATING FACTORS: PHYSICAL ACTIVITY
HIGHEST PAIN SEVERITY IN PAST 24 HOURS: 6/10
LIMITED RANGE OF MOTION: 1
MUSCLE WEAKNESS: 1
MUSCLE WEAKNESS: 1

## 2022-11-03 NOTE — CASE COMMUNICATION
Quality Review Completed for 10/26 [facility]ASIS by JERRELL Quintero RN on 11/3/2022:  Edits completed by JERRELL Quintero RN:  1.  changed to 0, no supportive documentation of vision deficits  2.  changed to 0 per therapy  3.  changed to 3 per [id number] I, J responses are partial/mod assist with ambulation

## 2022-11-03 NOTE — Clinical Note
I agree with these changes.   ----- Message -----  From: Roselyn Quintero R.N.  Sent: 11/3/2022   7:28 AM PDT  To: Sylvia Coronel R.N.      Quality Review Completed for 10/26 [facility] by JERRELL Quintero, RN on 11/3/2022:  Edits completed by JERRELL Quintero RN:  1.  changed to 0, no supportive documentation of vision deficits  2.  changed to 0 per therapy  3.  changed to 3 per [id number] I, J responses are partial/mod assist with ambulation

## 2022-11-04 ENCOUNTER — HOME CARE VISIT (OUTPATIENT)
Dept: HOME HEALTH SERVICES | Facility: HOME HEALTHCARE | Age: 57
End: 2022-11-04
Payer: COMMERCIAL

## 2022-11-04 PROCEDURE — G0179 MD RECERTIFICATION HHA PT: HCPCS | Performed by: INTERNAL MEDICINE

## 2022-11-04 PROCEDURE — G0299 HHS/HOSPICE OF RN EA 15 MIN: HCPCS

## 2022-11-06 VITALS
DIASTOLIC BLOOD PRESSURE: 74 MMHG | TEMPERATURE: 98.1 F | SYSTOLIC BLOOD PRESSURE: 130 MMHG | HEART RATE: 95 BPM | OXYGEN SATURATION: 98 % | RESPIRATION RATE: 16 BRPM

## 2022-11-06 VITALS
HEART RATE: 93 BPM | TEMPERATURE: 97.8 F | RESPIRATION RATE: 16 BRPM | DIASTOLIC BLOOD PRESSURE: 72 MMHG | OXYGEN SATURATION: 97 % | SYSTOLIC BLOOD PRESSURE: 120 MMHG

## 2022-11-06 ASSESSMENT — ENCOUNTER SYMPTOMS
PAIN SEVERITY GOAL: 6/10
HIGHEST PAIN SEVERITY IN PAST 24 HOURS: 6/10
SHORTNESS OF BREATH: 1
VOMITING: NO
HIGHEST PAIN SEVERITY IN PAST 24 HOURS: 6/10
SUBJECTIVE PAIN PROGRESSION: UNCHANGED
PAIN: 1
LIMITED RANGE OF MOTION: 1
LOWEST PAIN SEVERITY IN PAST 24 HOURS: 4/10
PAIN SEVERITY GOAL: 6/10
MUSCLE WEAKNESS: 1
DYSPNEA ACTIVITY LEVEL: AFTER AMBULATING MORE THAN 20 FT
LOWEST PAIN SEVERITY IN PAST 24 HOURS: 6/10
NAUSEA: NO
LIMITED RANGE OF MOTION: 1
NAUSEA: DENIES
MUSCLE WEAKNESS: 1
PERSON REPORTING PAIN: PATIENT
VOMITING: DENIES

## 2022-11-07 ENCOUNTER — HOME CARE VISIT (OUTPATIENT)
Dept: HOME HEALTH SERVICES | Facility: HOME HEALTHCARE | Age: 57
End: 2022-11-07
Payer: COMMERCIAL

## 2022-11-07 PROCEDURE — G0299 HHS/HOSPICE OF RN EA 15 MIN: HCPCS

## 2022-11-08 VITALS
OXYGEN SATURATION: 93 % | DIASTOLIC BLOOD PRESSURE: 88 MMHG | TEMPERATURE: 97.7 F | RESPIRATION RATE: 16 BRPM | HEART RATE: 98 BPM | SYSTOLIC BLOOD PRESSURE: 144 MMHG

## 2022-11-08 ASSESSMENT — ENCOUNTER SYMPTOMS
LOWEST PAIN SEVERITY IN PAST 24 HOURS: 5/10
PERSON REPORTING PAIN: PATIENT
HIGHEST PAIN SEVERITY IN PAST 24 HOURS: 6/10
PAIN SEVERITY GOAL: 6/10
MUSCLE WEAKNESS: 1
STOOL FREQUENCY: LESS THAN DAILY
BOWEL PATTERN NORMAL: 1
SUBJECTIVE PAIN PROGRESSION: UNCHANGED
NAUSEA: NO
VOMITING: NO
PAIN: 1
LAST BOWEL MOVEMENT: 66420
LIMITED RANGE OF MOTION: 1

## 2022-11-09 ENCOUNTER — HOME CARE VISIT (OUTPATIENT)
Dept: HOME HEALTH SERVICES | Facility: HOME HEALTHCARE | Age: 57
End: 2022-11-09
Payer: COMMERCIAL

## 2022-11-09 PROCEDURE — G0299 HHS/HOSPICE OF RN EA 15 MIN: HCPCS

## 2022-11-11 ENCOUNTER — HOME CARE VISIT (OUTPATIENT)
Dept: HOME HEALTH SERVICES | Facility: HOME HEALTHCARE | Age: 57
End: 2022-11-11
Payer: COMMERCIAL

## 2022-11-11 ENCOUNTER — OFFICE VISIT (OUTPATIENT)
Dept: WOUND CARE | Facility: MEDICAL CENTER | Age: 57
End: 2022-11-11
Attending: INTERNAL MEDICINE
Payer: COMMERCIAL

## 2022-11-11 VITALS
HEART RATE: 104 BPM | TEMPERATURE: 97.6 F | RESPIRATION RATE: 18 BRPM | OXYGEN SATURATION: 94 % | DIASTOLIC BLOOD PRESSURE: 81 MMHG | SYSTOLIC BLOOD PRESSURE: 115 MMHG

## 2022-11-11 DIAGNOSIS — I89.0 LYMPHEDEMA: ICD-10-CM

## 2022-11-11 DIAGNOSIS — R60.0 LOWER EXTREMITY EDEMA: ICD-10-CM

## 2022-11-11 DIAGNOSIS — T24.331D FULL THICKNESS BURN OF RIGHT LOWER LEG, SUBSEQUENT ENCOUNTER: ICD-10-CM

## 2022-11-11 DIAGNOSIS — E66.9 OBESITY (BMI 30-39.9): ICD-10-CM

## 2022-11-11 PROCEDURE — 16020 DRESS/DEBRID P-THICK BURN S: CPT | Performed by: NURSE PRACTITIONER

## 2022-11-11 PROCEDURE — 99214 OFFICE O/P EST MOD 30 MIN: CPT | Mod: 25 | Performed by: NURSE PRACTITIONER

## 2022-11-11 PROCEDURE — 11042 DBRDMT SUBQ TIS 1ST 20SQCM/<: CPT

## 2022-11-11 PROCEDURE — 99213 OFFICE O/P EST LOW 20 MIN: CPT

## 2022-11-11 ASSESSMENT — ENCOUNTER SYMPTOMS
CONSTIPATION: 0
CHILLS: 0
COUGH: 0
FEVER: 0
CLAUDICATION: 0
SHORTNESS OF BREATH: 0
NAUSEA: 0
DIARRHEA: 0
VOMITING: 0
BACK PAIN: 1

## 2022-11-11 NOTE — PATIENT INSTRUCTIONS
-Keep dressings clean and dry. Change dressings if they become over saturated, soiled or fall off. Otherwise, your home health nurse will change your dressings between wound clinic visits.    -Avoid prolonged standing or sitting without elevating your legs.    -Remove your compression garments if you have severe pain, severe swelling, numbness, color change, or temperature change in your toes. If you need to remove your compression garments, do so by unrolling them. Do not cut the compression garments off, this is to prevent cutting yourself on accident.    -Should you experience any significant changes in your wounds, such as signs of infection (increasing redness, swelling, localized heat, increased pain, fever > 101 F, chills) or have any questions regarding your home care instructions, please contact the wound center at [phone]. If after hours, contact your primary care physician or go to the hospital emergency room.     -If you are 5 or more minutes late for an appointment, we reserve the right to cancel and reschedule that appointment. Additionally, if you are habitually late or not showing (3 late cancellations and/or no shows), we reserve the right to cancel your remaining appointments and it will be your responsibility to obtain a new referral if services are still needed.
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
## 2022-11-12 NOTE — PROGRESS NOTES
Wound care order routed to [facility] via [facility].

## 2022-11-13 VITALS
SYSTOLIC BLOOD PRESSURE: 138 MMHG | HEART RATE: 93 BPM | OXYGEN SATURATION: 98 % | RESPIRATION RATE: 16 BRPM | DIASTOLIC BLOOD PRESSURE: 70 MMHG | TEMPERATURE: 97.8 F

## 2022-11-13 ASSESSMENT — ENCOUNTER SYMPTOMS
LOWEST PAIN SEVERITY IN PAST 24 HOURS: 0/10
MUSCLE WEAKNESS: 1
VOMITING: NO
LIMITED RANGE OF MOTION: 1
NAUSEA: NO
PAIN SEVERITY GOAL: 6/10
HIGHEST PAIN SEVERITY IN PAST 24 HOURS: 6/10

## 2022-11-14 ENCOUNTER — HOME CARE VISIT (OUTPATIENT)
Dept: HOME HEALTH SERVICES | Facility: HOME HEALTHCARE | Age: 57
End: 2022-11-14
Payer: COMMERCIAL

## 2022-11-14 PROCEDURE — G0299 HHS/HOSPICE OF RN EA 15 MIN: HCPCS

## 2022-11-15 VITALS
TEMPERATURE: 97.9 F | OXYGEN SATURATION: 99 % | SYSTOLIC BLOOD PRESSURE: 122 MMHG | HEART RATE: 72 BPM | DIASTOLIC BLOOD PRESSURE: 70 MMHG | RESPIRATION RATE: 16 BRPM

## 2022-11-15 ASSESSMENT — ENCOUNTER SYMPTOMS
LIMITED RANGE OF MOTION: 1
NAUSEA: NO
LOWEST PAIN SEVERITY IN PAST 24 HOURS: 0/10
HIGHEST PAIN SEVERITY IN PAST 24 HOURS: 6/10
PAIN SEVERITY GOAL: 6/10
MUSCLE WEAKNESS: 1
SUBJECTIVE PAIN PROGRESSION: UNCHANGED
VOMITING: NO

## 2022-11-16 ENCOUNTER — HOME CARE VISIT (OUTPATIENT)
Dept: HOME HEALTH SERVICES | Facility: HOME HEALTHCARE | Age: 57
End: 2022-11-16
Payer: COMMERCIAL

## 2022-11-16 PROCEDURE — G0299 HHS/HOSPICE OF RN EA 15 MIN: HCPCS

## 2022-11-18 ENCOUNTER — HOME CARE VISIT (OUTPATIENT)
Dept: HOME HEALTH SERVICES | Facility: HOME HEALTHCARE | Age: 57
End: 2022-11-18
Payer: COMMERCIAL

## 2022-11-18 ENCOUNTER — OFFICE VISIT (OUTPATIENT)
Dept: WOUND CARE | Facility: MEDICAL CENTER | Age: 57
End: 2022-11-18
Attending: INTERNAL MEDICINE
Payer: COMMERCIAL

## 2022-11-18 VITALS
SYSTOLIC BLOOD PRESSURE: 136 MMHG | HEART RATE: 93 BPM | RESPIRATION RATE: 18 BRPM | DIASTOLIC BLOOD PRESSURE: 82 MMHG | OXYGEN SATURATION: 98 % | TEMPERATURE: 98 F

## 2022-11-18 DIAGNOSIS — E66.9 OBESITY (BMI 30-39.9): ICD-10-CM

## 2022-11-18 DIAGNOSIS — R60.0 LOWER EXTREMITY EDEMA: ICD-10-CM

## 2022-11-18 DIAGNOSIS — I89.0 LYMPHEDEMA: ICD-10-CM

## 2022-11-18 DIAGNOSIS — L97.919 ULCER OF RIGHT LOWER EXTREMITY, UNSPECIFIED ULCER STAGE (HCC): ICD-10-CM

## 2022-11-18 DIAGNOSIS — T24.331D FULL THICKNESS BURN OF RIGHT LOWER LEG, SUBSEQUENT ENCOUNTER: ICD-10-CM

## 2022-11-18 PROCEDURE — 16020 DRESS/DEBRID P-THICK BURN S: CPT

## 2022-11-18 PROCEDURE — 16020 DRESS/DEBRID P-THICK BURN S: CPT | Performed by: STUDENT IN AN ORGANIZED HEALTH CARE EDUCATION/TRAINING PROGRAM

## 2022-11-19 VITALS
DIASTOLIC BLOOD PRESSURE: 70 MMHG | RESPIRATION RATE: 16 BRPM | SYSTOLIC BLOOD PRESSURE: 122 MMHG | TEMPERATURE: 98.2 F | OXYGEN SATURATION: 98 % | HEART RATE: 88 BPM

## 2022-11-19 ASSESSMENT — ENCOUNTER SYMPTOMS
PAIN LOCATION: LEGS
LIMITED RANGE OF MOTION: 1
SUBJECTIVE PAIN PROGRESSION: UNCHANGED
MUSCLE WEAKNESS: 1
PAIN SEVERITY GOAL: 6/10
LAST BOWEL MOVEMENT: 66429
BOWEL PATTERN NORMAL: 1
VOMITING: NO
PAIN: 1
HIGHEST PAIN SEVERITY IN PAST 24 HOURS: 6/10
LOWEST PAIN SEVERITY IN PAST 24 HOURS: 0/10
NAUSEA: NO
STOOL FREQUENCY: LESS THAN DAILY
PERSON REPORTING PAIN: PATIENT

## 2022-11-21 ENCOUNTER — HOME CARE VISIT (OUTPATIENT)
Dept: HOME HEALTH SERVICES | Facility: HOME HEALTHCARE | Age: 57
End: 2022-11-21
Payer: COMMERCIAL

## 2022-11-21 VITALS
RESPIRATION RATE: 16 BRPM | OXYGEN SATURATION: 98 % | DIASTOLIC BLOOD PRESSURE: 74 MMHG | TEMPERATURE: 97.9 F | HEART RATE: 92 BPM | SYSTOLIC BLOOD PRESSURE: 132 MMHG

## 2022-11-21 PROCEDURE — G0299 HHS/HOSPICE OF RN EA 15 MIN: HCPCS

## 2022-11-21 ASSESSMENT — ENCOUNTER SYMPTOMS
PAIN: 1
LOWEST PAIN SEVERITY IN PAST 24 HOURS: 0/10
PERSON REPORTING PAIN: PATIENT
LAST BOWEL MOVEMENT: 66434
NAUSEA: NO
VOMITING: NO
LIMITED RANGE OF MOTION: 1
PAIN SEVERITY GOAL: 6/10
MUSCLE WEAKNESS: 1
SUBJECTIVE PAIN PROGRESSION: UNCHANGED
HIGHEST PAIN SEVERITY IN PAST 24 HOURS: 6/10
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
## 2022-11-23 ENCOUNTER — HOME CARE VISIT (OUTPATIENT)
Dept: HOME HEALTH SERVICES | Facility: HOME HEALTHCARE | Age: 57
End: 2022-11-23
Payer: COMMERCIAL

## 2022-11-23 PROCEDURE — G0299 HHS/HOSPICE OF RN EA 15 MIN: HCPCS

## 2022-11-24 VITALS
TEMPERATURE: 97.8 F | SYSTOLIC BLOOD PRESSURE: 120 MMHG | OXYGEN SATURATION: 98 % | DIASTOLIC BLOOD PRESSURE: 72 MMHG | RESPIRATION RATE: 16 BRPM | HEART RATE: 93 BPM

## 2022-11-24 ASSESSMENT — ENCOUNTER SYMPTOMS
NAUSEA: NO
SUBJECTIVE PAIN PROGRESSION: UNCHANGED
LAST BOWEL MOVEMENT: 66436
HIGHEST PAIN SEVERITY IN PAST 24 HOURS: 6/10
MUSCLE WEAKNESS: 1
LOWEST PAIN SEVERITY IN PAST 24 HOURS: 5/10
LIMITED RANGE OF MOTION: 1
VOMITING: NO
PAIN SEVERITY GOAL: 6/10

## 2022-11-25 ENCOUNTER — HOME CARE VISIT (OUTPATIENT)
Dept: HOME HEALTH SERVICES | Facility: HOME HEALTHCARE | Age: 57
End: 2022-11-25
Payer: COMMERCIAL

## 2022-11-25 PROCEDURE — G0299 HHS/HOSPICE OF RN EA 15 MIN: HCPCS

## 2022-11-28 ENCOUNTER — HOME CARE VISIT (OUTPATIENT)
Dept: HOME HEALTH SERVICES | Facility: HOME HEALTHCARE | Age: 57
End: 2022-11-28
Payer: COMMERCIAL

## 2022-11-28 VITALS
RESPIRATION RATE: 16 BRPM | HEART RATE: 95 BPM | OXYGEN SATURATION: 99 % | DIASTOLIC BLOOD PRESSURE: 70 MMHG | SYSTOLIC BLOOD PRESSURE: 126 MMHG | TEMPERATURE: 98.1 F

## 2022-11-28 PROCEDURE — 665003 FOLLOW UP-HOME HEALTH

## 2022-11-28 PROCEDURE — G0299 HHS/HOSPICE OF RN EA 15 MIN: HCPCS

## 2022-11-28 ASSESSMENT — ENCOUNTER SYMPTOMS
NAUSEA: NO
LAST BOWEL MOVEMENT: 66438
MUSCLE WEAKNESS: 1
LIMITED RANGE OF MOTION: 1
DENIES PAIN: 1
PERSON REPORTING PAIN: PATIENT
VOMITING: NO

## 2022-11-30 ENCOUNTER — HOME CARE VISIT (OUTPATIENT)
Dept: HOME HEALTH SERVICES | Facility: HOME HEALTHCARE | Age: 57
End: 2022-11-30
Payer: COMMERCIAL

## 2022-11-30 VITALS
OXYGEN SATURATION: 99 % | HEART RATE: 92 BPM | RESPIRATION RATE: 16 BRPM | TEMPERATURE: 97.8 F | DIASTOLIC BLOOD PRESSURE: 72 MMHG | SYSTOLIC BLOOD PRESSURE: 126 MMHG

## 2022-11-30 PROCEDURE — G0299 HHS/HOSPICE OF RN EA 15 MIN: HCPCS

## 2022-11-30 ASSESSMENT — ENCOUNTER SYMPTOMS
VOMITING: NO
HIGHEST PAIN SEVERITY IN PAST 24 HOURS: 6/10
LIMITED RANGE OF MOTION: 1
LAST BOWEL MOVEMENT: 66441
LOWEST PAIN SEVERITY IN PAST 24 HOURS: 0/10
MUSCLE WEAKNESS: 1
NAUSEA: NO
PAIN SEVERITY GOAL: 6/10
SUBJECTIVE PAIN PROGRESSION: UNCHANGED

## 2022-12-02 ENCOUNTER — OFFICE VISIT (OUTPATIENT)
Dept: WOUND CARE | Facility: MEDICAL CENTER | Age: 57
End: 2022-12-02
Attending: INTERNAL MEDICINE
Payer: COMMERCIAL

## 2022-12-02 ENCOUNTER — HOME CARE VISIT (OUTPATIENT)
Dept: HOME HEALTH SERVICES | Facility: HOME HEALTHCARE | Age: 57
End: 2022-12-02
Payer: COMMERCIAL

## 2022-12-02 VITALS
HEART RATE: 96 BPM | SYSTOLIC BLOOD PRESSURE: 149 MMHG | DIASTOLIC BLOOD PRESSURE: 87 MMHG | RESPIRATION RATE: 18 BRPM | TEMPERATURE: 97.7 F | OXYGEN SATURATION: 93 %

## 2022-12-02 VITALS
OXYGEN SATURATION: 99 % | DIASTOLIC BLOOD PRESSURE: 68 MMHG | RESPIRATION RATE: 16 BRPM | HEART RATE: 98 BPM | TEMPERATURE: 97.9 F | SYSTOLIC BLOOD PRESSURE: 137 MMHG

## 2022-12-02 DIAGNOSIS — T24.331D FULL THICKNESS BURN OF RIGHT LOWER LEG, SUBSEQUENT ENCOUNTER: ICD-10-CM

## 2022-12-02 DIAGNOSIS — I89.0 LYMPHEDEMA: ICD-10-CM

## 2022-12-02 DIAGNOSIS — S81.801D OPEN WOUND OF RIGHT LOWER LEG, SUBSEQUENT ENCOUNTER: ICD-10-CM

## 2022-12-02 DIAGNOSIS — E66.9 OBESITY (BMI 30-39.9): ICD-10-CM

## 2022-12-02 DIAGNOSIS — R60.0 LOWER EXTREMITY EDEMA: ICD-10-CM

## 2022-12-02 PROCEDURE — 99213 OFFICE O/P EST LOW 20 MIN: CPT | Performed by: NURSE PRACTITIONER

## 2022-12-02 PROCEDURE — 99213 OFFICE O/P EST LOW 20 MIN: CPT

## 2022-12-02 ASSESSMENT — ENCOUNTER SYMPTOMS
VOMITING: NO
PAIN SEVERITY GOAL: 0/10
HIGHEST PAIN SEVERITY IN PAST 24 HOURS: 6/10
LAST BOWEL MOVEMENT: 66443
STOOL FREQUENCY: DAILY
PERSON REPORTING PAIN: PATIENT
MUSCLE WEAKNESS: 1
SUBJECTIVE PAIN PROGRESSION: UNCHANGED
LOWEST PAIN SEVERITY IN PAST 24 HOURS: 0/10
LIMITED RANGE OF MOTION: 1
BOWEL PATTERN NORMAL: 1
NAUSEA: NO
PAIN: 1

## 2022-12-03 NOTE — PATIENT INSTRUCTIONS
-Keep your wound dressing clean, dry, and intact.    -Change your dressing if it becomes soiled, soaked, or falls off.    -Remove your compression wrap if you have severe pain, severe swelling, numbness, color change, or temperature change in your toes. If you need to remove your compression wrap, do so by unrolling it. Do not cut the compression wrap off to prevent cutting yourself on accident.    -Should you experience any significant changes in your wound(s), such as infection (redness, swelling, localized heat, increased pain, fever > 101 F, chills) or have any questions regarding your home care instructions, please contact the wound center at [phone]. If after hours, contact your primary care physician or go to the hospital emergency room.
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
## 2022-12-05 ENCOUNTER — HOME CARE VISIT (OUTPATIENT)
Dept: HOME HEALTH SERVICES | Facility: HOME HEALTHCARE | Age: 57
End: 2022-12-05
Payer: COMMERCIAL

## 2022-12-05 PROCEDURE — G0299 HHS/HOSPICE OF RN EA 15 MIN: HCPCS

## 2022-12-06 VITALS
TEMPERATURE: 97.6 F | DIASTOLIC BLOOD PRESSURE: 62 MMHG | OXYGEN SATURATION: 99 % | SYSTOLIC BLOOD PRESSURE: 120 MMHG | RESPIRATION RATE: 16 BRPM | HEART RATE: 91 BPM

## 2022-12-06 ASSESSMENT — ENCOUNTER SYMPTOMS
NAUSEA: NO
SUBJECTIVE PAIN PROGRESSION: UNCHANGED
LIMITED RANGE OF MOTION: 1
PAIN SEVERITY GOAL: 0/10
HIGHEST PAIN SEVERITY IN PAST 24 HOURS: 6/10
PAIN: 1
LOWEST PAIN SEVERITY IN PAST 24 HOURS: 0/10
VOMITING: NO
PERSON REPORTING PAIN: PATIENT
LAST BOWEL MOVEMENT: 66448
MUSCLE WEAKNESS: 1

## 2022-12-07 ENCOUNTER — HOME CARE VISIT (OUTPATIENT)
Dept: HOME HEALTH SERVICES | Facility: HOME HEALTHCARE | Age: 57
End: 2022-12-07
Payer: COMMERCIAL

## 2022-12-09 ENCOUNTER — APPOINTMENT (OUTPATIENT)
Dept: WOUND CARE | Facility: MEDICAL CENTER | Age: 57
End: 2022-12-09
Attending: INTERNAL MEDICINE
Payer: COMMERCIAL

## 2022-12-09 ENCOUNTER — OFFICE VISIT (OUTPATIENT)
Dept: WOUND CARE | Facility: MEDICAL CENTER | Age: 57
End: 2022-12-09
Payer: COMMERCIAL

## 2022-12-09 DIAGNOSIS — T24.331D FULL THICKNESS BURN OF RIGHT LOWER LEG, SUBSEQUENT ENCOUNTER: ICD-10-CM

## 2022-12-09 PROCEDURE — 99999 PR NO CHARGE: CPT | Mod: MISDOCU | Performed by: NURSE PRACTITIONER

## 2022-12-10 NOTE — PROGRESS NOTES
Patient canceled her appointment.  She called to say that she had fallen and is going into urgent care.

## 2022-12-12 ENCOUNTER — HOME CARE VISIT (OUTPATIENT)
Dept: HOME HEALTH SERVICES | Facility: HOME HEALTHCARE | Age: 57
End: 2022-12-12
Payer: COMMERCIAL

## 2022-12-12 PROCEDURE — G0299 HHS/HOSPICE OF RN EA 15 MIN: HCPCS

## 2022-12-15 VITALS
RESPIRATION RATE: 16 BRPM | SYSTOLIC BLOOD PRESSURE: 132 MMHG | HEART RATE: 96 BPM | TEMPERATURE: 97.9 F | OXYGEN SATURATION: 98 % | DIASTOLIC BLOOD PRESSURE: 72 MMHG

## 2022-12-15 ASSESSMENT — ENCOUNTER SYMPTOMS
SUBJECTIVE PAIN PROGRESSION: UNCHANGED
NAUSEA: NO
LAST BOWEL MOVEMENT: 66455
PAIN: 1
LOWEST PAIN SEVERITY IN PAST 24 HOURS: 2/10
VOMITING: NO
HIGHEST PAIN SEVERITY IN PAST 24 HOURS: 6/10
PERSON REPORTING PAIN: PATIENT
MUSCLE WEAKNESS: 1
LIMITED RANGE OF MOTION: 1
PAIN SEVERITY GOAL: 6/10

## 2022-12-16 ENCOUNTER — OFFICE VISIT (OUTPATIENT)
Dept: WOUND CARE | Facility: MEDICAL CENTER | Age: 57
End: 2022-12-16
Attending: INTERNAL MEDICINE
Payer: COMMERCIAL

## 2022-12-16 VITALS
HEART RATE: 104 BPM | SYSTOLIC BLOOD PRESSURE: 140 MMHG | OXYGEN SATURATION: 4 % | DIASTOLIC BLOOD PRESSURE: 84 MMHG | TEMPERATURE: 97.9 F | RESPIRATION RATE: 20 BRPM

## 2022-12-16 DIAGNOSIS — E66.9 OBESITY (BMI 30-39.9): ICD-10-CM

## 2022-12-16 DIAGNOSIS — T24.331D FULL THICKNESS BURN OF RIGHT LOWER LEG, SUBSEQUENT ENCOUNTER: Primary | ICD-10-CM

## 2022-12-16 DIAGNOSIS — I89.0 LYMPHEDEMA: ICD-10-CM

## 2022-12-16 PROCEDURE — 16020 DRESS/DEBRID P-THICK BURN S: CPT | Performed by: NURSE PRACTITIONER

## 2022-12-16 PROCEDURE — 11042 DBRDMT SUBQ TIS 1ST 20SQCM/<: CPT

## 2022-12-17 NOTE — PATIENT INSTRUCTIONS
-Keep your wound dressing clean, dry, and intact.    -Change your dressing if it becomes soiled, soaked, or falls off.    -Remove your compression wrap if you have severe pain, severe swelling, numbness, color change, or temperature change in your toes. If you need to remove your compression wrap, do so by unrolling it. Do not cut the compression wrap off to prevent cutting yourself on accident.    -Should you experience any significant changes in your wound(s), such as infection (redness, swelling, localized heat, increased pain, fever > 101 F, chills) or have any questions regarding your home care instructions, please contact the wound center at [phone]. If after hours, contact your primary care physician or go to the hospital emergency room.
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
## 2022-12-20 ENCOUNTER — HOME CARE VISIT (OUTPATIENT)
Dept: HOME HEALTH SERVICES | Facility: HOME HEALTHCARE | Age: 57
End: 2022-12-20
Payer: COMMERCIAL

## 2022-12-20 PROCEDURE — G0299 HHS/HOSPICE OF RN EA 15 MIN: HCPCS

## 2022-12-21 VITALS
RESPIRATION RATE: 16 BRPM | SYSTOLIC BLOOD PRESSURE: 132 MMHG | TEMPERATURE: 97.6 F | DIASTOLIC BLOOD PRESSURE: 84 MMHG | OXYGEN SATURATION: 98 % | HEART RATE: 93 BPM

## 2022-12-21 ASSESSMENT — ENCOUNTER SYMPTOMS
MUSCLE WEAKNESS: 1
SUBJECTIVE PAIN PROGRESSION: UNCHANGED
LIMITED RANGE OF MOTION: 1
HIGHEST PAIN SEVERITY IN PAST 24 HOURS: 6/10
PAIN: 1
PAIN LOCATION - PAIN FREQUENCY: INTERMITTENT
PAIN SEVERITY GOAL: 0/10
LOWEST PAIN SEVERITY IN PAST 24 HOURS: 0/10
PERSON REPORTING PAIN: PATIENT

## 2022-12-23 ENCOUNTER — HOME CARE VISIT (OUTPATIENT)
Dept: HOME HEALTH SERVICES | Facility: HOME HEALTHCARE | Age: 57
End: 2022-12-23
Payer: COMMERCIAL

## 2022-12-23 VITALS
DIASTOLIC BLOOD PRESSURE: 70 MMHG | RESPIRATION RATE: 16 BRPM | TEMPERATURE: 98.5 F | OXYGEN SATURATION: 96 % | HEART RATE: 94 BPM | SYSTOLIC BLOOD PRESSURE: 132 MMHG

## 2022-12-23 PROCEDURE — G0299 HHS/HOSPICE OF RN EA 15 MIN: HCPCS

## 2022-12-26 ENCOUNTER — HOME CARE VISIT (OUTPATIENT)
Dept: HOME HEALTH SERVICES | Facility: HOME HEALTHCARE | Age: 57
End: 2022-12-26
Payer: COMMERCIAL

## 2022-12-26 PROCEDURE — G0493 RN CARE EA 15 MIN HH/HOSPICE: HCPCS

## 2022-12-26 ASSESSMENT — ENCOUNTER SYMPTOMS
NAUSEA: NO
VOMITING: NO
PAIN: 1
LOWEST PAIN SEVERITY IN PAST 24 HOURS: 0/10
PERSON REPORTING PAIN: PATIENT
HIGHEST PAIN SEVERITY IN PAST 24 HOURS: 6/10
LIMITED RANGE OF MOTION: 1
PAIN SEVERITY GOAL: 0/10

## 2022-12-27 ENCOUNTER — HOME CARE VISIT (OUTPATIENT)
Dept: HOME HEALTH SERVICES | Facility: HOME HEALTHCARE | Age: 57
End: 2022-12-27
Payer: COMMERCIAL

## 2022-12-27 VITALS
SYSTOLIC BLOOD PRESSURE: 128 MMHG | HEART RATE: 95 BPM | OXYGEN SATURATION: 97 % | TEMPERATURE: 97.9 F | RESPIRATION RATE: 16 BRPM | DIASTOLIC BLOOD PRESSURE: 72 MMHG

## 2022-12-27 ASSESSMENT — PATIENT HEALTH QUESTIONNAIRE - PHQ9: CLINICAL INTERPRETATION OF PHQ2 SCORE: 0

## 2022-12-27 ASSESSMENT — ENCOUNTER SYMPTOMS
PAIN LOCATION - PAIN FREQUENCY: INTERMITTENT
PAIN LOCATION: LEFT KNEE
PAIN SEVERITY GOAL: 6/10
HIGHEST PAIN SEVERITY IN PAST 24 HOURS: 6/10
PAIN LOCATION - PAIN SEVERITY: 5/10
PERSON REPORTING PAIN: PATIENT
SHORTNESS OF BREATH: 1
PAIN LOCATION - EXACERBATING FACTORS: AMBULATION
PAIN LOCATION - PAIN QUALITY: ACHY
PAIN LOCATION - RELIEVING FACTORS: RESTING, OFFLOADING
VOMITING: NO
SUBJECTIVE PAIN PROGRESSION: UNCHANGED
PAIN LOCATION - PAIN DURATION: WITH AMBULATION
DYSPNEA ACTIVITY LEVEL: AFTER AMBULATING MORE THAN 20 FT
NAUSEA: NO
PAIN: 1
LOWEST PAIN SEVERITY IN PAST 24 HOURS: 0/10

## 2022-12-27 ASSESSMENT — ACTIVITIES OF DAILY LIVING (ADL): OASIS_M1830: 02

## 2022-12-29 ENCOUNTER — DOCUMENTATION (OUTPATIENT)
Dept: MEDICAL GROUP | Facility: PHYSICIAN GROUP | Age: 57
End: 2022-12-29
Payer: COMMERCIAL

## 2022-12-29 NOTE — PROGRESS NOTES
Medication chart review for [facility] services    Received referral from [facility].   Medications reviewed  compared with discharge summary if available.    Current medication list per [facility]     Current Outpatient Medications:     furosemide, 40 mg, Oral, QDAY PRN    morphine ER, 15 mg, Oral, Q12HRS    potassium Chloride ER, 20 mEq, Oral, QDAY PRN    vitamin D3, 1,000 Units, Oral, DAILY    Biotin, 1 Capsule, Oral, DAILY    gabapentin, 800 mg, Oral, TID    methocarbamol, 1,000 mg, Oral, 4X/DAY PRN    zinc sulfate, 220 mg, Oral, DAILY    Multivitamin Adult, 1 Tablet, Oral, DAILY    fluticasone, 2 Puff, Inhalation, BID PRN    ascorbic acid, 500 mg, Oral, BID    magnesium oxide, 400 mg, Oral, BID    oxyCODONE-acetaminophen, 1 Tablet, Oral, Q6HRS PRN    CALCIUM CARBONATE-VITAMIN D PO, 1 Tablet, Oral, DAILY    ferrous sulfate, 325 mg, Oral, QDAY with Breakfast    Location of hospital, and discharge summary date, if applicable:     Allergies   Allergen Reactions    Tobacco [Nicotiana Tabacum] Shortness of Breath     Cigarette smoke causes SOB, rispatory issues       Labs     Lab Results   Component Value Date/Time    SODIUM 137 09/13/2022 02:40 PM    POTASSIUM 4.0 09/13/2022 02:40 PM    CHLORIDE 103 09/13/2022 02:40 PM    CO2 24 09/13/2022 02:40 PM    GLUCOSE 100 (H) 09/13/2022 02:40 PM    BUN 9 09/13/2022 02:40 PM    CREATININE 0.61 09/13/2022 02:40 PM    CREATININE 0.88 08/14/2010 12:00 AM    BUNCREATRAT 13 08/14/2010 12:00 AM    GLOMRATE >59 08/14/2010 12:00 AM     Lab Results   Component Value Date/Time    ALKPHOSPHAT 144 (H) 09/13/2022 02:40 PM    ASTSGOT 25 09/13/2022 02:40 PM    ALTSGPT 12 09/13/2022 02:40 PM    TBILIRUBIN 0.3 09/13/2022 02:40 PM    INR 1.25 (H) 06/01/2022 03:30 PM    ALBUMIN 3.5 09/13/2022 02:40 PM    ALBUMIN 2.03 (L) 06/04/2022 06:14 AM        Assessment for clinically significant drug interactions, drug omissions/additions, duplicative therapies.            CC   Micky Rubin,  [place] [address]  Fax: [phone]    [facility]  Phone [phone] fax [phone]    This note was created using voice recognition software (Dragon). The accuracy of the dictation is limited by the abilities of the software. I have reviewed the note prior to signing, however some errors in grammar and context are still possible. If you have any questions related to this note please do not hesitate to contact our office.

## 2022-12-30 ENCOUNTER — OFFICE VISIT (OUTPATIENT)
Dept: URGENT CARE | Facility: CLINIC | Age: 57
End: 2022-12-30
Payer: COMMERCIAL

## 2022-12-30 ENCOUNTER — OFFICE VISIT (OUTPATIENT)
Dept: WOUND CARE | Facility: MEDICAL CENTER | Age: 57
End: 2022-12-30
Attending: INTERNAL MEDICINE
Payer: COMMERCIAL

## 2022-12-30 VITALS
HEART RATE: 109 BPM | TEMPERATURE: 99 F | DIASTOLIC BLOOD PRESSURE: 82 MMHG | SYSTOLIC BLOOD PRESSURE: 132 MMHG | RESPIRATION RATE: 18 BRPM | OXYGEN SATURATION: 93 %

## 2022-12-30 VITALS
BODY MASS INDEX: 31.89 KG/M2 | RESPIRATION RATE: 20 BRPM | HEIGHT: 63 IN | OXYGEN SATURATION: 98 % | WEIGHT: 180 LBS | TEMPERATURE: 98.3 F | SYSTOLIC BLOOD PRESSURE: 130 MMHG | HEART RATE: 102 BPM | DIASTOLIC BLOOD PRESSURE: 80 MMHG

## 2022-12-30 DIAGNOSIS — I89.0 LYMPHEDEMA: ICD-10-CM

## 2022-12-30 DIAGNOSIS — T24.331D FULL THICKNESS BURN OF RIGHT LOWER LEG, SUBSEQUENT ENCOUNTER: ICD-10-CM

## 2022-12-30 DIAGNOSIS — R06.2 WHEEZING: ICD-10-CM

## 2022-12-30 DIAGNOSIS — R60.0 LOWER EXTREMITY EDEMA: ICD-10-CM

## 2022-12-30 DIAGNOSIS — E66.9 OBESITY (BMI 30-39.9): ICD-10-CM

## 2022-12-30 PROCEDURE — 99213 OFFICE O/P EST LOW 20 MIN: CPT | Mod: 25 | Performed by: FAMILY MEDICINE

## 2022-12-30 PROCEDURE — 94640 AIRWAY INHALATION TREATMENT: CPT | Performed by: FAMILY MEDICINE

## 2022-12-30 PROCEDURE — 99213 OFFICE O/P EST LOW 20 MIN: CPT | Performed by: STUDENT IN AN ORGANIZED HEALTH CARE EDUCATION/TRAINING PROGRAM

## 2022-12-30 PROCEDURE — 99213 OFFICE O/P EST LOW 20 MIN: CPT

## 2022-12-30 RX ORDER — AZITHROMYCIN 250 MG/1
TABLET, FILM COATED ORAL
Qty: 6 TABLET | Refills: 0 | Status: SHIPPED | OUTPATIENT
Start: 2022-12-30 | End: 2023-01-16

## 2022-12-30 RX ORDER — ALBUTEROL SULFATE 2.5 MG/3ML
2.5 SOLUTION RESPIRATORY (INHALATION) ONCE
Status: COMPLETED | OUTPATIENT
Start: 2022-12-30 | End: 2022-12-30

## 2022-12-30 RX ADMIN — ALBUTEROL SULFATE 2.5 MG: 2.5 SOLUTION RESPIRATORY (INHALATION) at 19:02

## 2022-12-30 ASSESSMENT — FIBROSIS 4 INDEX: FIB4 SCORE: 0.98

## 2022-12-31 NOTE — WOUND TEAM
RLE wounds resolved today, applied silicone adhesive foam to scar tissue of anterior RLE and applied 2 layer wrap for compression per Dr. Galeana verbal order, patient does not have her own compression socks to use today. Patient states that she is still being followed by home health. Home health to remove compression wrap next appointment. Patient given educational handout with compression sock instructions, web sites on which to purchase socks and calf and ankle measurements to use when ordering.  Patient discharged from clinic today.

## 2022-12-31 NOTE — PATIENT INSTRUCTIONS
-Remove your compression wrap if you have severe pain, severe swelling, numbness, color change, or temperature change in your toes. If you need to remove your compression wrap, do so by unrolling it. Do not cut the compression wrap off to prevent cutting yourself on accident.    - Resolved wound be fragile for a few days, bathe and dry area gently, only ever regains a maximum of 80% of the tensile strength of the surrounding skin, remodeling of scar can continue for 6mo - a year. Contact PCP for a referral back to wound care if any problems with area opening and draining again.    -Should you experience any significant changes in your wound(s), such as infection (redness, swelling, localized heat, increased pain, fever > 101 F, chills) or have any questions regarding your home care instructions, please contact the wound center at [phone]. If after hours, contact your primary care physician or go to the hospital emergency room.
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
## 2022-12-31 NOTE — PROGRESS NOTES
"Chief Complaint   Patient presents with    Cough     X 1.5 weeks, cough, allergic to smoke, wheezing.           CC:  cough        Cough  This is a new problem. The current episode started 10 days ago. The problem has been unchanged. The problem occurs constantly. The cough is productive.    Cough is exacerbated by smoke and her neighbors smoke       + subj fever    + wheezing.         Pertinent negatives include no   headaches, nausea, vomiting, diarrhea, sweats, weight loss. Nothing aggravates the symptoms.  Patient has tried nothing for the symptoms. There is no history of asthma.        Past Medical History:   Diagnosis Date    Anemia, chronic disease 6/2/2022    Near syncope 6/1/2022    Multiple closed fractures of facial bone (HCC) 6/1/2022    Leukocytosis 4/3/2022    Sepsis (HCC) 4/3/2022    Administrative encounter- RTC ACCESS/ADA PARATRANSIT ELIGIBILITY 6/4/2019    S/P hip replacement, bilateral 2/13/2019    Pain 02/04/2019    arthritic pain    Microcytic anemia- postop THR 2/2019 12/13/2018    Edema 12/13/2018    Pain 12/04/2018    \"ALL OVER\", 10/10    Left hip pain 9/18/2018    Hypokalemia 6/7/2018    Hyponatremia 5/18/2012    History of gastric bypass 4/25/2012    Mild intermittent asthma with acute exacerbation 4/25/2012    Anemia     Arthritis     osteo/hips and back    At risk for falls     Chronic back pain     Dental disorder     lower denture    Urinary incontinence     using pads         Social History     Tobacco Use    Smoking status: Never    Smokeless tobacco: Never   Vaping Use    Vaping Use: Never used   Substance Use Topics    Alcohol use: Not Currently     Comment: 3-4 per week; pt stops alcohol use 1-week ago    Drug use: No     Frequency: 4.0 times per week         Current Outpatient Medications on File Prior to Visit   Medication Sig Dispense Refill    furosemide (LASIX) 40 MG Tab Take 1 Tablet by mouth 1 time a day as needed (leg edema). Patient reports only takes if leg edema " increases. She doesn't use daily as prescribed.   Indications: Edema 90 Tablet 4    morphine ER (MS CONTIN) 15 MG Tab CR tablet Take 15 mg by mouth every 12 hours. Indications: Pain      potassium Chloride ER (K-TAB) 20 MEQ Tab CR tablet Take 20 mEq by mouth 1 time a day as needed (Per patient, she takes with furosemide. ). Indications: takes with furosemide      vitamin D3 (CHOLECALCIFEROL) 1000 Unit (25 mcg) Tab Take 1,000 Units by mouth every day. Indications: Vitamin D Deficiency, dietary supplement      Biotin 5000 MCG Cap Take 1 Capsule by mouth every day. Indications: dietary supplement      gabapentin (NEURONTIN) 800 MG tablet Take 1 Tablet by mouth 3 times a day. 360 Tablet 3    methocarbamol (ROBAXIN) 500 MG Tab Take 2 Tablets by mouth 4 times a day as needed (back pain and spasms). Indications: Musculoskeletal Pain 240 Tablet 5    zinc sulfate (ZINCATE) 220 (50 Zn) MG Cap Take 220 mg by mouth every day. Indications: Impaired Wound Healing, dietary supplement       Multiple Vitamin (MULTIVITAMIN ADULT) Tab Take 1 Tablet by mouth every day. Indications: Nutritional Support      fluticasone (FLOVENT HFA) 44 MCG/ACT Aerosol Inhale 2 Puffs 2 times a day as needed (tobacco smoke). Everyday maintenance steroid inhaler 1 Each 11    ascorbic acid (VITAMIN C) 500 MG tablet Take 1 Tablet by mouth 2 times a day. 100 Tablet 4    magnesium oxide 400 (240 Mg) MG Tab Take 1 Tablet by mouth 2 times a day. 30 Tablet     oxyCODONE-acetaminophen (PERCOCET-10)  MG Tab Take 1 Tablet by mouth every 6 hours as needed for Moderate Pain or Severe Pain. Indications: breakthrough pain betweeen morphine administration  0    CALCIUM CARBONATE-VITAMIN D PO Take 1 Tablet by mouth every day. Indications: Low Amount of Calcium in the Blood      ferrous sulfate 325 (65 Fe) MG tablet Take 1 Tab by mouth every morning with breakfast. 30 Tab 1     No current facility-administered medications on file prior to visit.                     Review of Systems    HENT: negative for otalgia  Cardiovascular - denies chest pain or dyspnea  Respiratory: Positive for cough.  .  Negative for wheezing.    Neurological: Negative for headaches.   GI - denies nausea, vomiting or diarrhea  Neuro - denies numbness or tingling.            Objective:     There were no vitals taken for this visit.    Physical Exam   Constitutional: patient is oriented to person, place, and time. Patient appears well-developed and well-nourished. No distress.   HENT:   Head: Normocephalic and atraumatic.   Right Ear: External ear normal.   Left Ear: External ear normal.   Nose: Mucosal edema  present. Right sinus exhibits no maxillary sinus tenderness. Left sinus exhibits no maxillary sinus tenderness.   Mouth/Throat: Mucous membranes are normal. No oral lesions.  No posterior pharyngeal erythema.  No oropharyngeal exudate or posterior oropharyngeal edema.   Eyes: Conjunctivae and EOM are normal. Pupils are equal, round, and reactive to light. Right eye exhibits no discharge. Left eye exhibits no discharge. No scleral icterus.   Neck: Normal range of motion. Neck supple. No tracheal deviation present.   Cardiovascular: Normal rate, regular rhythm and normal heart sounds.  Exam reveals no friction rub.    Pulmonary/Chest: Effort normal. No respiratory distress. Patient has no     rhonchi.   + minor bilat exp wheezes noted.  Patient has no rales.    Musculoskeletal:  exhibits no edema.   Lymphadenopathy:     Patient has no cervical adenopathy.      Neurological: patient is alert and oriented to person, place, and time.   Skin: Skin is warm and dry. No rash noted. No erythema.   Ext:   no LE edema  Psychiatric: patient  has a normal mood and affect.  behavior is normal.   Nursing note and vitals reviewed.              Assessment/Plan:        1. Cough, wheezing    Wheezing improved after neb tx    Suspect CAP, but pt refused cxr.       - albuterol (PROVENTIL) 2.5mg/3ml nebulizer  solution 2.5 mg  - azithromycin (ZITHROMAX) 250 MG Tab; Take as directed  Dispense: 6 Tablet; Refill: 0       Follow up in one week if no improvement, sooner if symptoms worsen.

## 2023-01-03 ENCOUNTER — HOME CARE VISIT (OUTPATIENT)
Dept: HOME HEALTH SERVICES | Facility: HOME HEALTHCARE | Age: 58
End: 2023-01-03
Payer: COMMERCIAL

## 2023-01-03 PROCEDURE — G0299 HHS/HOSPICE OF RN EA 15 MIN: HCPCS

## 2023-01-03 PROCEDURE — 665003 FOLLOW UP-HOME HEALTH

## 2023-01-04 ENCOUNTER — HOME CARE VISIT (OUTPATIENT)
Dept: HOME HEALTH SERVICES | Facility: HOME HEALTHCARE | Age: 58
End: 2023-01-04
Payer: COMMERCIAL

## 2023-01-04 NOTE — CASE COMMUNICATION
Quality Review for 12.26.22 [facility] OASIS performed on by YANDEL Shukla RN on 1.4.2023:    Edits completed by YANDEL Shukla RN:  1.  and  dx coding updated per chart review.   2. Changed  to 3 per chart review from = sign  3. Removed F2F data

## 2023-01-04 NOTE — Clinical Note
I agree with these changes.   ----- Message -----  From: Florence Shukla R.N.  Sent: 1/4/2023  12:28 PM PST  To: Sylvia Coronel R.N.      Quality Review for 12.26.22 [facility] OASIS performed on by YANDEL Shukla RN on 1.4.2023:    Edits completed by YANDEL Shukla RN:  1.  and  dx coding updated per chart review.   2. Changed  to 3 per chart review from = sign  3. Removed F2F data

## 2023-01-05 VITALS
RESPIRATION RATE: 16 BRPM | HEART RATE: 92 BPM | TEMPERATURE: 97.9 F | OXYGEN SATURATION: 99 % | SYSTOLIC BLOOD PRESSURE: 136 MMHG | DIASTOLIC BLOOD PRESSURE: 70 MMHG

## 2023-01-05 ASSESSMENT — ENCOUNTER SYMPTOMS
SUBJECTIVE PAIN PROGRESSION: UNCHANGED
HIGHEST PAIN SEVERITY IN PAST 24 HOURS: 6/10
LOWEST PAIN SEVERITY IN PAST 24 HOURS: 4/10
PAIN LOCATION: LEFT KNEE
PAIN SEVERITY GOAL: 5/10

## 2023-01-06 ASSESSMENT — ENCOUNTER SYMPTOMS
MUSCLE WEAKNESS: 1
NAUSEA: NO
LIMITED RANGE OF MOTION: 1
VOMITING: NO
LAST BOWEL MOVEMENT: 66477

## 2023-01-07 ENCOUNTER — HOME CARE VISIT (OUTPATIENT)
Dept: HOME HEALTH SERVICES | Facility: HOME HEALTHCARE | Age: 58
End: 2023-01-07
Payer: COMMERCIAL

## 2023-01-07 PROCEDURE — G0299 HHS/HOSPICE OF RN EA 15 MIN: HCPCS

## 2023-01-08 ENCOUNTER — HOME CARE VISIT (OUTPATIENT)
Dept: HOME HEALTH SERVICES | Facility: HOME HEALTHCARE | Age: 58
End: 2023-01-08
Payer: COMMERCIAL

## 2023-01-08 VITALS
SYSTOLIC BLOOD PRESSURE: 114 MMHG | DIASTOLIC BLOOD PRESSURE: 70 MMHG | RESPIRATION RATE: 16 BRPM | OXYGEN SATURATION: 97 % | HEART RATE: 90 BPM | TEMPERATURE: 99.4 F

## 2023-01-08 ASSESSMENT — ENCOUNTER SYMPTOMS
VOMITING: DENIES
LIMITED RANGE OF MOTION: 1
PAIN LOCATION: RIGHT LOWER EXTREMITY
DRY SKIN: 1
FATIGUES EASILY: 1
PAIN LOCATION - PAIN SEVERITY: 4/10
LOWEST PAIN SEVERITY IN PAST 24 HOURS: 4/10
PERSON REPORTING PAIN: PATIENT
PAIN: 1
PAIN SEVERITY GOAL: 3/10
HIGHEST PAIN SEVERITY IN PAST 24 HOURS: 6/10
SUBJECTIVE PAIN PROGRESSION: UNCHANGED
BOWEL PATTERN NORMAL: 1
ARTHRALGIAS: 1
PAIN LOCATION - PAIN QUALITY: ACHE
NAUSEA: DENIES
MUSCLE WEAKNESS: 1
PAIN LOCATION - PAIN FREQUENCY: CONSTANT
STOOL FREQUENCY: DAILY
PAIN LOCATION - PAIN DURATION: DAILY
LAST BOWEL MOVEMENT: 66480

## 2023-01-08 ASSESSMENT — ACTIVITIES OF DAILY LIVING (ADL)
AMBULATION ASSISTANCE: STAND BY ASSIST
CURRENT_FUNCTION: STAND BY ASSIST

## 2023-01-09 PROCEDURE — G0179 MD RECERTIFICATION HHA PT: HCPCS | Performed by: INTERNAL MEDICINE

## 2023-01-10 ENCOUNTER — HOME CARE VISIT (OUTPATIENT)
Dept: HOME HEALTH SERVICES | Facility: HOME HEALTHCARE | Age: 58
End: 2023-01-10
Payer: COMMERCIAL

## 2023-01-10 PROCEDURE — G0299 HHS/HOSPICE OF RN EA 15 MIN: HCPCS

## 2023-01-12 ENCOUNTER — OFFICE VISIT (OUTPATIENT)
Dept: URGENT CARE | Facility: CLINIC | Age: 58
End: 2023-01-12
Payer: COMMERCIAL

## 2023-01-12 ENCOUNTER — APPOINTMENT (OUTPATIENT)
Dept: RADIOLOGY | Facility: IMAGING CENTER | Age: 58
End: 2023-01-12
Attending: STUDENT IN AN ORGANIZED HEALTH CARE EDUCATION/TRAINING PROGRAM
Payer: COMMERCIAL

## 2023-01-12 VITALS
RESPIRATION RATE: 18 BRPM | SYSTOLIC BLOOD PRESSURE: 130 MMHG | BODY MASS INDEX: 31.89 KG/M2 | HEART RATE: 96 BPM | WEIGHT: 180 LBS | DIASTOLIC BLOOD PRESSURE: 82 MMHG | OXYGEN SATURATION: 99 % | HEIGHT: 63 IN | TEMPERATURE: 98.3 F

## 2023-01-12 VITALS
TEMPERATURE: 98 F | DIASTOLIC BLOOD PRESSURE: 70 MMHG | SYSTOLIC BLOOD PRESSURE: 120 MMHG | HEART RATE: 95 BPM | RESPIRATION RATE: 16 BRPM | OXYGEN SATURATION: 98 %

## 2023-01-12 DIAGNOSIS — R05.9 COUGH, UNSPECIFIED TYPE: ICD-10-CM

## 2023-01-12 DIAGNOSIS — Z77.22 EXPOSURE TO CIGARETTE SMOKE: ICD-10-CM

## 2023-01-12 DIAGNOSIS — R06.2 WHEEZING: ICD-10-CM

## 2023-01-12 PROCEDURE — 71046 X-RAY EXAM CHEST 2 VIEWS: CPT | Mod: TC,FY | Performed by: RADIOLOGY

## 2023-01-12 PROCEDURE — 99214 OFFICE O/P EST MOD 30 MIN: CPT | Performed by: STUDENT IN AN ORGANIZED HEALTH CARE EDUCATION/TRAINING PROGRAM

## 2023-01-12 RX ORDER — ALBUTEROL SULFATE 90 UG/1
2 AEROSOL, METERED RESPIRATORY (INHALATION) EVERY 6 HOURS PRN
Qty: 8.5 G | Refills: 0 | Status: SHIPPED | OUTPATIENT
Start: 2023-01-12

## 2023-01-12 RX ORDER — METHYLPREDNISOLONE 4 MG/1
TABLET ORAL
Qty: 21 TABLET | Refills: 0 | Status: SHIPPED | OUTPATIENT
Start: 2023-01-12 | End: 2023-04-19

## 2023-01-12 RX ORDER — CETIRIZINE HYDROCHLORIDE 10 MG/1
10 TABLET ORAL DAILY
Qty: 30 TABLET | Refills: 0 | Status: ON HOLD | OUTPATIENT
Start: 2023-01-12 | End: 2023-06-07

## 2023-01-12 ASSESSMENT — ENCOUNTER SYMPTOMS
PAIN LOCATION - PAIN SEVERITY: 5/10
SUBJECTIVE PAIN PROGRESSION: UNCHANGED
COUGH: 1
HIGHEST PAIN SEVERITY IN PAST 24 HOURS: 6/10
PAIN SEVERITY GOAL: 5/10
LAST BOWEL MOVEMENT: 66484
PERSON REPORTING PAIN: PATIENT
PAIN LOCATION: LEFT KNEE
LOWEST PAIN SEVERITY IN PAST 24 HOURS: 5/10
VOMITING: NO
NAUSEA: NO
PAIN: 1

## 2023-01-12 ASSESSMENT — FIBROSIS 4 INDEX: FIB4 SCORE: 0.98

## 2023-01-13 ENCOUNTER — HOME CARE VISIT (OUTPATIENT)
Dept: HOME HEALTH SERVICES | Facility: HOME HEALTHCARE | Age: 58
End: 2023-01-13
Payer: COMMERCIAL

## 2023-01-13 PROCEDURE — G0493 RN CARE EA 15 MIN HH/HOSPICE: HCPCS

## 2023-01-13 NOTE — PROGRESS NOTES
"Subjective     Karine Ovalle is a 57 y.o. female who presents with Cough (X 3-4 weeks, allergic reaction to smoke from neighbors, irritation on throat.)            Karine is a 57 y.o. female wh presents for symptoms of cough for 3 to 4 weeks.  Patient states she believes she is having allergic reaction to smoke.  Patient states that she lives in a non-smoking complex and her neighbors have been smoking cigarettes.  Has brought it up to her complex and states they have hung up signs which temporarily caused her neighbors to stop smoking but recently you have resumed.  Patient states cigarette smoke is causing irritation on her throat/lungs and causing her to cough.  Patient states she was seen in urgent care previously and was prescribed Z-Paulo which she believes helped.  She states symptoms have flared up again.  She smoked does cause symptoms of shortness of breath/wheezing.  Reports no fever/chills or fatigue.  No history of asthma.      Review of Systems   Constitutional:  Negative for chills, fever and malaise/fatigue.   HENT:  Negative for congestion, ear pain and sore throat.    Respiratory:  Positive for cough, shortness of breath and wheezing.    Cardiovascular:  Negative for chest pain and palpitations.   Gastrointestinal:  Negative for abdominal pain, constipation, diarrhea, nausea and vomiting.   Neurological:  Negative for dizziness and headaches.   All other systems reviewed and are negative.           Objective     /82   Pulse 96   Temp 36.8 °C (98.3 °F) (Temporal)   Resp 18   Ht 1.6 m (5' 3\")   Wt 81.6 kg (180 lb)   LMP  (LMP Unknown)   SpO2 99%   BMI 31.89 kg/m²      Physical Exam  Vitals reviewed.   Constitutional:       General: She is not in acute distress.     Appearance: Normal appearance. She is not ill-appearing, toxic-appearing or diaphoretic.   HENT:      Head: Normocephalic and atraumatic.      Nose: Nose normal.      Mouth/Throat:      Mouth: Mucous membranes are moist. "      Pharynx: Oropharynx is clear.   Eyes:      Extraocular Movements: Extraocular movements intact.      Conjunctiva/sclera: Conjunctivae normal.      Pupils: Pupils are equal, round, and reactive to light.   Cardiovascular:      Rate and Rhythm: Normal rate and regular rhythm.   Pulmonary:      Effort: Pulmonary effort is normal.      Breath sounds: Normal breath sounds.   Skin:     General: Skin is warm.   Neurological:      General: No focal deficit present.      Mental Status: She is alert. Mental status is at baseline.                RADIOLOGY RESULTS   DX-CHEST-2 VIEWS    Result Date: 1/12/2023 1/12/2023 6:19 PM HISTORY/REASON FOR EXAM:  Cough. TECHNIQUE/EXAM DESCRIPTION AND NUMBER OF VIEWS: Two views of the chest. COMPARISON:  1 view chest 4/3/2022 FINDINGS: LUNGS: The lungs are clear. HEART and MEDIASTINUM: normal in size. Pleura: There are no pleural effusion or pneumothoraces. Osseous structures: Cervical spine hardware is present     No acute cardiopulmonary abnormality identified.                     Assessment & Plan        1. Cough, unspecified type  - DX-CHEST-2 VIEWS  - cetirizine (ZYRTEC ALLERGY) 10 MG Tab; Take 1 Tablet by mouth every day.  Dispense: 30 Tablet; Refill: 0  - Continue tessalon as prescribed at previous visit.    2. Exposure to cigarette smoke    3. Wheezing  - albuterol 108 (90 Base) MCG/ACT Aero Soln inhalation aerosol; Inhale 2 Puffs every 6 hours as needed for Shortness of Breath.  Dispense: 8.5 g; Refill: 0  - methylPREDNISolone (MEDROL DOSEPAK) 4 MG Tablet Therapy Pack; Follow schedule on package instructions.  Dispense: 21 Tablet; Refill: 0  - cetirizine (ZYRTEC ALLERGY) 10 MG Tab; Take 1 Tablet by mouth every day.  Dispense: 30 Tablet; Refill: 0     DX-CHEST-2 VIEWS PER RADIOLOGY:  No acute cardiopulmonary abnormality identified.    Patient is well-appearing in clinic and does not appear to be in acute distress.  Pulmonary exam revealed normal effort and breath sounds  bilaterally without wheezes, rales, rhonchi.  Patient requesting chest x-ray.  Chest x-ray revealed no acute cardiopulmonary abnormality.  His vital signs are stable.  SPO2 99% on room air.    I personally reviewed prior external notes and test results pertinent to today's visit, bleeding previous urgent care visit on 12/20/22.    Differential diagnoses, supportive care, and indications for immediate follow-up discussed with patient. Pathogenesis of diagnosis discussed including typical length and natural progression.      My total time spent caring for the patient on the day of the encounter was 31 minutes including reviewing patient's chart, obtaining patient history, discussing differential diagnosis, supportive care measures, plan of care, appropriate follow-up and indications for immediate follow-up.This does not include time spent on separately billable procedures/tests.     Instructed to return to urgent care or nearest emergency department if symptoms fail to improve, for any change in condition, further concerns, or new concerning symptoms.    Patient states understanding and agrees with the plan of care and discharge instructions.

## 2023-01-14 VITALS
OXYGEN SATURATION: 98 % | SYSTOLIC BLOOD PRESSURE: 118 MMHG | HEART RATE: 95 BPM | RESPIRATION RATE: 16 BRPM | TEMPERATURE: 97.8 F | DIASTOLIC BLOOD PRESSURE: 60 MMHG

## 2023-01-14 ASSESSMENT — ENCOUNTER SYMPTOMS
SUBJECTIVE PAIN PROGRESSION: UNCHANGED
PAIN LOCATION - PAIN FREQUENCY: INTERMITTENT
PAIN SEVERITY GOAL: 5/10
PAIN LOCATION: LEFT KNEE
PAIN: 1
PAIN LOCATION - PAIN QUALITY: ACHY
PAIN LOCATION - EXACERBATING FACTORS: WITH AMBULATION
HIGHEST PAIN SEVERITY IN PAST 24 HOURS: 5/10
PAIN LOCATION - RELIEVING FACTORS: RESTING, OFFLOADING
PAIN LOCATION - PAIN SEVERITY: 5/10
PERSON REPORTING PAIN: PATIENT
LOWEST PAIN SEVERITY IN PAST 24 HOURS: 0/10

## 2023-01-14 ASSESSMENT — ACTIVITIES OF DAILY LIVING (ADL)
HOME_HEALTH_OASIS: 00
OASIS_M1830: 00

## 2023-01-16 ASSESSMENT — ENCOUNTER SYMPTOMS
FEVER: 0
SHORTNESS OF BREATH: 1
PALPITATIONS: 0
WHEEZING: 1
CONSTIPATION: 0
SORE THROAT: 0
CHILLS: 0
DIZZINESS: 0
VOMITING: 0
HEADACHES: 0
NAUSEA: 0
ABDOMINAL PAIN: 0
DIARRHEA: 0

## 2023-01-19 ENCOUNTER — OFFICE VISIT (OUTPATIENT)
Dept: MEDICAL GROUP | Age: 58
End: 2023-01-19
Payer: COMMERCIAL

## 2023-01-19 VITALS
OXYGEN SATURATION: 98 % | WEIGHT: 190 LBS | DIASTOLIC BLOOD PRESSURE: 72 MMHG | RESPIRATION RATE: 16 BRPM | TEMPERATURE: 97.4 F | BODY MASS INDEX: 33.66 KG/M2 | HEIGHT: 63 IN | SYSTOLIC BLOOD PRESSURE: 130 MMHG | HEART RATE: 108 BPM

## 2023-01-19 DIAGNOSIS — E66.09 CLASS 1 OBESITY DUE TO EXCESS CALORIES WITH SERIOUS COMORBIDITY AND BODY MASS INDEX (BMI) OF 33.0 TO 33.9 IN ADULT: ICD-10-CM

## 2023-01-19 DIAGNOSIS — Z02.9 ADMINISTRATIVE ENCOUNTER: ICD-10-CM

## 2023-01-19 DIAGNOSIS — Z00.8 ENCOUNTER FOR WORK CAPABILITY ASSESSMENT: ICD-10-CM

## 2023-01-19 DIAGNOSIS — Z12.12 SCREENING FOR COLORECTAL CANCER: ICD-10-CM

## 2023-01-19 DIAGNOSIS — S81.801A OPEN WOUND OF RIGHT LOWER EXTREMITY, INITIAL ENCOUNTER: ICD-10-CM

## 2023-01-19 DIAGNOSIS — M17.0 PRIMARY OSTEOARTHRITIS OF BOTH KNEES: ICD-10-CM

## 2023-01-19 DIAGNOSIS — R05.3 CHRONIC COUGH: ICD-10-CM

## 2023-01-19 DIAGNOSIS — R73.01 IFG (IMPAIRED FASTING GLUCOSE): ICD-10-CM

## 2023-01-19 DIAGNOSIS — M50.30 DDD (DEGENERATIVE DISC DISEASE), CERVICAL: ICD-10-CM

## 2023-01-19 DIAGNOSIS — J45.909 ALLERGIC BRONCHITIS WITHOUT COMPLICATION: ICD-10-CM

## 2023-01-19 DIAGNOSIS — E78.5 DYSLIPIDEMIA: ICD-10-CM

## 2023-01-19 DIAGNOSIS — Z23 NEED FOR VACCINATION: ICD-10-CM

## 2023-01-19 DIAGNOSIS — J45.40 MODERATE PERSISTENT EXTRINSIC ASTHMA WITHOUT COMPLICATION: ICD-10-CM

## 2023-01-19 DIAGNOSIS — E55.9 VITAMIN D DEFICIENCY: ICD-10-CM

## 2023-01-19 DIAGNOSIS — Z12.11 SCREENING FOR COLORECTAL CANCER: ICD-10-CM

## 2023-01-19 PROCEDURE — 99214 OFFICE O/P EST MOD 30 MIN: CPT | Performed by: INTERNAL MEDICINE

## 2023-01-19 RX ORDER — ALBUTEROL SULFATE 90 UG/1
2 AEROSOL, METERED RESPIRATORY (INHALATION) EVERY 4 HOURS PRN
Qty: 1 EACH | Refills: 11 | Status: SHIPPED | OUTPATIENT
Start: 2023-01-19 | End: 2024-01-30

## 2023-01-19 RX ORDER — AMOXICILLIN AND CLAVULANATE POTASSIUM 875; 125 MG/1; MG/1
1 TABLET, FILM COATED ORAL 2 TIMES DAILY
Qty: 20 TABLET | Refills: 1 | Status: SHIPPED | OUTPATIENT
Start: 2023-01-19 | End: 2023-05-12

## 2023-01-19 RX ORDER — FLUTICASONE PROPIONATE AND SALMETEROL 250; 50 UG/1; UG/1
1 POWDER RESPIRATORY (INHALATION) EVERY 12 HOURS
Qty: 1 EACH | Refills: 5 | Status: SHIPPED | OUTPATIENT
Start: 2023-01-19 | End: 2024-03-25

## 2023-01-19 ASSESSMENT — ENCOUNTER SYMPTOMS
CARDIOVASCULAR NEGATIVE: 1
EYES NEGATIVE: 1
RESPIRATORY NEGATIVE: 1
PSYCHIATRIC NEGATIVE: 1
MUSCULOSKELETAL NEGATIVE: 1
NEUROLOGICAL NEGATIVE: 1
CONSTITUTIONAL NEGATIVE: 1
GASTROINTESTINAL NEGATIVE: 1

## 2023-01-19 ASSESSMENT — FIBROSIS 4 INDEX: FIB4 SCORE: 0.98

## 2023-01-19 ASSESSMENT — PATIENT HEALTH QUESTIONNAIRE - PHQ9: CLINICAL INTERPRETATION OF PHQ2 SCORE: 0

## 2023-01-19 NOTE — PROGRESS NOTES
Subjective     Karine Ovalle is a 57 y.o. female who presents with Paperwork (FMLA paperwork )  Patient is here primarily for follow-up of her FMLA paperwork and need for work accommodations and removal from the workplace because of her underlying medical disorders.  Her medical disorders are severe osteoarthritic changes of multiple joints particularly in the left knee which needs total arthroplasty and is scheduled be done sometime later this year when her other problems with wound care have resolved completely.  She is status post total joint arthroplasty of both hips and the right knee.    With regards to her chronic recurrent skin infections and skin breakdown ulcerations, these have improved tremendously with the wound care clinic and the wound VAC has been removed.  She is still at high risk for skin breakdown again because of arthritic changes obesity and her severe obesity.    The demands of the workplace work would increase the risk for recurrent skin infections and worsen her knee pain predisposing her to immobility and risk for pressure ulcers.    Until the patient's get her left knee arthroplasty done she is still unable to return to the workplace.  We will see her every 3 months to document this.    New problem is cough and wheezing and purulent sputum production.  She feels it secondary to secondhand smoke inhalation which is very sensitive to.  She has had longstanding issues with cough and wheezing and is never been formally diagnosed with asthma or had PFTs done.  Needs referral to pulmonary for this reason..  /  Patient Active Problem List   Diagnosis    Vitamin B 12 deficiency    Obesity (BMI 30-39.9)    Chronic bilateral low back pain with bilateral sciatica- pain mgt;    spine nv    Venous insufficiency    Lymphedema    Uncomplicated opioid dependence (CMS-[facility])- spine nv    Essential hypertension    DDD (degenerative disc disease), lumbar    Primary osteoarthritis of both hips- dr nowak;  left THR done feb 6, 2019; right THR 3/2018    Class 1 obesity due to excess calories with serious comorbidity and body mass index (BMI) of 33.0 to 33.9 in adult- rx phentermine    DDD (degenerative disc disease), cervical- MOD-SEVERE SPINE NV- dr brennan; fusion and laminectomy C3-T1 12/5/2018 dr brennan    Encounter for work capability assessment- FMLA PAPERWORK    Cervical myelopathy (HCC)    Vitamin D deficiency    Primary insomnia    Mixed stress and urge urinary incontinence    Primary osteoarthritis of both knees- sp right TKR 2015; left TKR tbd 2021 or 2022- dr nowak    Administrative [id number] FMLA paperwork for workplace accommodation for chronic DDD and DJD hips and shoulders bilaterally status post multiple joint replacements    Leg edema    Cellulitis of right lower extremity    Open wound of right lower extremity- needs wound vac    Chronic pain syndrome    Moderate persistent asthma without complication    Serum gamma globulin increased    Hypomagnesemia    Allergic bronchitis without complication    Obesity due to excess calories with serious comorbidity     Outpatient Medications Prior to Visit   Medication Sig Dispense Refill    albuterol 108 (90 Base) MCG/ACT Aero Soln inhalation aerosol Inhale 2 Puffs every 6 hours as needed for Shortness of Breath. 8.5 g 0    methylPREDNISolone (MEDROL DOSEPAK) 4 MG Tablet Therapy Pack Follow schedule on package instructions. 21 Tablet 0    cetirizine (ZYRTEC ALLERGY) 10 MG Tab Take 1 Tablet by mouth every day. 30 Tablet 0    furosemide (LASIX) 40 MG Tab Take 1 Tablet by mouth 1 time a day as needed (leg edema). Patient reports only takes if leg edema increases. She doesn't use daily as prescribed.   Indications: Edema 90 Tablet 4    morphine ER (MS CONTIN) 15 MG Tab CR tablet Take 15 mg by mouth every 12 hours. Indications: Pain      potassium Chloride ER (K-TAB) 20 MEQ Tab CR tablet Take 20 mEq by mouth 1 time a day as needed (Per patient, she takes  with furosemide. ). Indications: takes with furosemide      vitamin D3 (CHOLECALCIFEROL) 1000 Unit (25 mcg) Tab Take 1,000 Units by mouth every day. Indications: Vitamin D Deficiency, dietary supplement      Biotin 5000 MCG Cap Take 1 Capsule by mouth every day. Indications: dietary supplement      gabapentin (NEURONTIN) 800 MG tablet Take 1 Tablet by mouth 3 times a day. 360 Tablet 3    methocarbamol (ROBAXIN) 500 MG Tab Take 2 Tablets by mouth 4 times a day as needed (back pain and spasms). Indications: Musculoskeletal Pain 240 Tablet 5    zinc sulfate (ZINCATE) 220 (50 Zn) MG Cap Take 220 mg by mouth every day. Indications: Impaired Wound Healing, dietary supplement       Multiple Vitamin (MULTIVITAMIN ADULT) Tab Take 1 Tablet by mouth every day. Indications: Nutritional Support      fluticasone (FLOVENT HFA) 44 MCG/ACT Aerosol Inhale 2 Puffs 2 times a day as needed (tobacco smoke). Everyday maintenance steroid inhaler 1 Each 11    ascorbic acid (VITAMIN C) 500 MG tablet Take 1 Tablet by mouth 2 times a day. 100 Tablet 4    magnesium oxide 400 (240 Mg) MG Tab Take 1 Tablet by mouth 2 times a day. 30 Tablet     oxyCODONE-acetaminophen (PERCOCET-10)  MG Tab Take 1 Tablet by mouth every 6 hours as needed for Moderate Pain or Severe Pain. Indications: breakthrough pain betweeen morphine administration  0    CALCIUM CARBONATE-VITAMIN D PO Take 1 Tablet by mouth every day. Indications: Low Amount of Calcium in the Blood      ferrous sulfate 325 (65 Fe) MG tablet Take 1 Tab by mouth every morning with breakfast. 30 Tab 1     No facility-administered medications prior to visit.     Allergies   Allergen Reactions    Tobacco [Nicotiana Tabacum] Shortness of Breath     Cigarette smoke causes SOB, rispatory issues     .;lll  No visits with results within 1 Month(s) from this visit.   Latest known visit with results is:   Hospital Outpatient Visit on 09/13/2022   Component Date Value    Sodium 09/13/2022 137      Potassium 09/13/2022 4.0     Chloride 09/13/2022 103     Co2 09/13/2022 24     Anion Gap 09/13/2022 10.0     Glucose 09/13/2022 100 (H)     Bun 09/13/2022 9     Creatinine 09/13/2022 0.61     Calcium 09/13/2022 9.2     AST(SGOT) 09/13/2022 25     ALT(SGPT) 09/13/2022 12     Alkaline Phosphatase 09/13/2022 144 (H)     Total Bilirubin 09/13/2022 0.3     Albumin 09/13/2022 3.5     Total Protein 09/13/2022 7.5     Globulin 09/13/2022 4.0 (H)     A-G Ratio 09/13/2022 0.9     WBC 09/13/2022 5.8     RBC 09/13/2022 4.42     Hemoglobin 09/13/2022 12.4     Hematocrit 09/13/2022 36.1 (L)     MCV 09/13/2022 81.7     MCH 09/13/2022 28.1     MCHC 09/13/2022 34.3     RDW 09/13/2022 41.2     Platelet Count 09/13/2022 419     MPV 09/13/2022 9.8     Neutrophils-Polys 09/13/2022 48.40     Lymphocytes 09/13/2022 38.40     Monocytes 09/13/2022 8.00     Eosinophils 09/13/2022 4.30     Basophils 09/13/2022 0.70     Immature Granulocytes 09/13/2022 0.20     Nucleated RBC 09/13/2022 0.00     Neutrophils (Absolute) 09/13/2022 2.83     Lymphs (Absolute) 09/13/2022 2.24     Monos (Absolute) 09/13/2022 0.47     Eos (Absolute) 09/13/2022 0.25     Baso (Absolute) 09/13/2022 0.04     Immature Granulocytes (a* 09/13/2022 0.01     NRBC (Absolute) 09/13/2022 0.00     Sed Rate Westergren 09/13/2022 17     Stat C-Reactive Protein 09/13/2022 1.72 (H)     GFR (CKD-EPI) 09/13/2022 104       .alll  Lab Results   Component Value Date/Time    HBA1C 4.7 06/02/2022 02:46 AM    HBA1C 5.1 07/26/2021 11:14 AM     Lab Results   Component Value Date/Time    SODIUM 137 09/13/2022 02:40 PM    POTASSIUM 4.0 09/13/2022 02:40 PM    CHLORIDE 103 09/13/2022 02:40 PM    CO2 24 09/13/2022 02:40 PM    GLUCOSE 100 (H) 09/13/2022 02:40 PM    BUN 9 09/13/2022 02:40 PM    CREATININE 0.61 09/13/2022 02:40 PM    CREATININE 0.88 08/14/2010 12:00 AM    BUNCREATRAT 13 08/14/2010 12:00 AM    GLOMRATE >59 08/14/2010 12:00 AM    ALKPHOSPHAT 144 (H) 09/13/2022 02:40 PM    ASTSGOT 25  "09/13/2022 02:40 PM    ALTSGPT 12 09/13/2022 02:40 PM    TBILIRUBIN 0.3 09/13/2022 02:40 PM     Lab Results   Component Value Date/Time    INR 1.25 (H) 06/01/2022 03:30 PM    INR 1.00 02/13/2019 11:44 AM    INR 1.01 12/04/2018 11:15 AM     Lab Results   Component Value Date/Time    CHOLSTRLTOT 147 07/26/2021 11:14 AM    LDL 63 07/26/2021 11:14 AM    HDL 75 07/26/2021 11:14 AM    TRIGLYCERIDE 44 07/26/2021 11:14 AM       No results found for: TESTOSTERONE  Lab Results   Component Value Date/Time    TSH 2.110 08/14/2010 12:00 AM     Lab Results   Component Value Date/Time    FREET4 0.72 05/03/2016 07:32 AM     Lab Results   Component Value Date/Time    URICACID 5.6 08/12/2014 04:49 PM     No components found for: VITB12  Lab Results   Component Value Date/Time    25HYDROXY 44 06/02/2022 02:42 PM    25HYDROXY 50 07/26/2021 11:14 AM               HPI    Review of Systems   Constitutional: Negative.    HENT: Negative.     Eyes: Negative.    Respiratory: Negative.     Cardiovascular: Negative.    Gastrointestinal: Negative.    Genitourinary: Negative.    Musculoskeletal: Negative.    Skin: Negative.    Neurological: Negative.    Endo/Heme/Allergies: Negative.    Psychiatric/Behavioral: Negative.               Objective     /72 (BP Location: Right arm, Patient Position: Sitting, BP Cuff Size: Adult)   Pulse (!) 108   Temp 36.3 °C (97.4 °F) (Temporal)   Resp 16   Ht 1.6 m (5' 3\")   Wt 86.2 kg (190 lb)   LMP  (LMP Unknown)   SpO2 98%   BMI 33.66 kg/m²      Physical Exam  Vitals reviewed.   Constitutional:       General: She is not in acute distress.     Appearance: She is well-developed. She is not diaphoretic.   HENT:      Head: Normocephalic and atraumatic.      Right Ear: External ear normal.      Left Ear: External ear normal.      Nose: Nose normal.      Mouth/Throat:      Pharynx: No oropharyngeal exudate.   Eyes:      General: No scleral icterus.        Right eye: No discharge.         Left eye: No " discharge.      Conjunctiva/sclera: Conjunctivae normal.      Pupils: Pupils are equal, round, and reactive to light.   Neck:      Thyroid: No thyromegaly.      Vascular: No JVD.      Trachea: No tracheal deviation.   Cardiovascular:      Rate and Rhythm: Normal rate and regular rhythm.      Heart sounds: Normal heart sounds. No murmur heard.    No friction rub. No gallop.   Pulmonary:      Effort: Pulmonary effort is normal. No respiratory distress.      Breath sounds: Normal breath sounds. No stridor. No wheezing or rales.   Chest:      Chest wall: No tenderness.   Abdominal:      General: Bowel sounds are normal. There is no distension.      Palpations: Abdomen is soft. There is no mass.      Tenderness: There is no abdominal tenderness. There is no guarding or rebound.   Musculoskeletal:         General: No tenderness. Normal range of motion.      Cervical back: Normal range of motion and neck supple.   Lymphadenopathy:      Cervical: No cervical adenopathy.   Skin:     General: Skin is warm and dry.      Coloration: Skin is not pale.      Findings: No erythema or rash.   Neurological:      Mental Status: She is alert and oriented to person, place, and time.      Cranial Nerves: No cranial nerve deficit.      Motor: No abnormal muscle tone.      Coordination: Coordination normal.      Deep Tendon Reflexes: Reflexes are normal and symmetric. Reflexes normal.   Psychiatric:         Behavior: Behavior normal.         Thought Content: Thought content normal.         Judgment: Judgment normal.                           Assessment & Plan        1. Encounter for work capability assessment  See above comments.  Patient is not capable of returning to the workplace environment.  He needs immediate working from home.  Due to his severe osteoarthritis of the left knee that needs total biopsy.  She is also at risk for wound breakdown if she resolves her workplace because of the history of the same in the demands placed on  her at that increase her risk of wound breakdown in her lower extremities from demands of increased mobility requiring workplace.  We can from home would be the better option for at least the next 3 to 12 months.  We will see her every 3 months to document this.    2. Allergic bronchitis without complication  Is a new problem.  Refer to pulmonary to document asthma.  Start on Advair and continue rescue inhaler with albuterol.  Antibiotics for her acute bronchitis.  - Referral to Pulmonary and Sleep Medicine  - amoxicillin-clavulanate (AUGMENTIN) 875-125 MG Tab; Take 1 Tablet by mouth 2 times a day.  Dispense: 20 Tablet; Refill: 1    3. Chronic cough       As above.  And below  - Referral to Pulmonary and Sleep Medicine    4. Moderate persistent extrinsic asthma without complication  As above  - Referral to Pulmonary and Sleep Medicine  - albuterol 108 (90 Base) MCG/ACT Aero Soln inhalation aerosol; Inhale 2 Puffs every four hours as needed for Shortness of Breath.  Dispense: 1 Each; Refill: 11  - fluticasone-salmeterol (ADVAIR) 250-50 MCG/ACT AEROSOL POWDER, BREATH ACTIVATED; Inhale 1 Puff every 12 hours.  Dispense: 1 Each; Refill: 5    5. IFG (impaired fasting glucose)  Mediterranean diet and exercise.  Check labs.  - HEMOGLOBIN A1C; Future    6. Dyslipidemia  Mediterranean diet and exercise.  Check labs  - TSH; Future  - Comp Metabolic Panel; Future  - Lipid Profile; Future  - CBC WITH DIFFERENTIAL; Future    7. Vitamin D deficiency  Continue with vitamin D3 1000 is daily.  - VITAMIN D,25 HYDROXY (DEFICIENCY); Future    8. Open wound of right lower extremity, initial encounter  This is slowly healing and it is still susceptible to early recurrent breakdown.  Advised to remain out of the workplace because of this.    9. Primary osteoarthritis of both knees- sp right TKR 2015; left TKR tbd 2021 or 2022- dr nowak  As above.  Should be able to return orthopedic surgeon in 3 to 6 months for consideration for total  knee arthroplasty on the left now that the wound has healed nearly completely.    10. DDD (degenerative disc disease), cervical- MOD-SEVERE SPINE NV- dr brennan; fusion and laminectomy C3-T1 12/5/2018 dr brennan  Under good control.  Continue physical therapy.    11. Administrative [id number] FMLA paperwork for workplace accommodation for chronic DDD and DJD hips and shoulders bilaterally status post multiple joint replacements  FMI paperwork filled out.    12. Need for vaccination  Will get these vaccines at the pharmacy.  - Zoster Vac Recomb Adjuvanted (SHINGRIX) 50 MCG/0.5ML Recon Susp; Inject 0.5 mL into the shoulder, thigh, or buttocks one time for 1 dose.  Dispense: 0.5 mL; Refill: 0  - INFLUENZA VACCINE QUAD INJ (PF)    13. Screening for colorectal cancer     - Referral to GI for Colonoscopy    14. Class 1 obesity due to excess calories with serious comorbidity and body mass index (BMI) of 33.0 to 33.9 in adult     - Patient identified as having weight management issue.  Appropriate orders and counseling given.  - Referral to Nutrition Services

## 2023-01-26 ENCOUNTER — TELEPHONE (OUTPATIENT)
Dept: HEALTH INFORMATION MANAGEMENT | Facility: OTHER | Age: 58
End: 2023-01-26
Payer: COMMERCIAL

## 2023-02-02 NOTE — PROGRESS NOTES
Pt discharged home in good and stable condition. Reviewed all discharge instructions and answered any questions. Midline discontinued, pt tolerated well. Escorted downstairs via  at 1703.   5

## 2023-03-01 ENCOUNTER — PATIENT MESSAGE (OUTPATIENT)
Dept: MEDICAL GROUP | Age: 58
End: 2023-03-01
Payer: COMMERCIAL

## 2023-03-01 DIAGNOSIS — I89.0 LYMPHEDEMA: ICD-10-CM

## 2023-03-09 DIAGNOSIS — E66.9 OBESITY (BMI 30-39.9): ICD-10-CM

## 2023-03-09 RX ORDER — PHENTERMINE HYDROCHLORIDE 37.5 MG/1
37.5 CAPSULE ORAL EVERY MORNING
Qty: 90 CAPSULE | Refills: 1 | Status: ON HOLD | OUTPATIENT
Start: 2023-03-09 | End: 2023-06-08

## 2023-03-15 ENCOUNTER — APPOINTMENT (OUTPATIENT)
Dept: SLEEP MEDICINE | Facility: MEDICAL CENTER | Age: 58
End: 2023-03-15
Attending: INTERNAL MEDICINE
Payer: COMMERCIAL

## 2023-03-15 ASSESSMENT — ENCOUNTER SYMPTOMS
HEMOPTYSIS: 0
SHORTNESS OF BREATH: 1
DYSPNEA AT REST: 0
WHEEZING: 1
CHEST TIGHTNESS: 0

## 2023-03-16 ENCOUNTER — TELEPHONE (OUTPATIENT)
Dept: MEDICAL GROUP | Age: 58
End: 2023-03-16

## 2023-04-06 ENCOUNTER — PHYSICAL THERAPY (OUTPATIENT)
Dept: PHYSICAL THERAPY | Facility: REHABILITATION | Age: 58
End: 2023-04-06
Attending: INTERNAL MEDICINE
Payer: COMMERCIAL

## 2023-04-06 DIAGNOSIS — I89.0 LYMPHEDEMA OF EXTREMITY: ICD-10-CM

## 2023-04-06 PROCEDURE — 97162 PT EVAL MOD COMPLEX 30 MIN: CPT
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
## 2023-04-10 ENCOUNTER — APPOINTMENT (OUTPATIENT)
Dept: PHYSICAL THERAPY | Facility: REHABILITATION | Age: 58
End: 2023-04-10
Attending: INTERNAL MEDICINE
Payer: COMMERCIAL

## 2023-04-13 ENCOUNTER — APPOINTMENT (OUTPATIENT)
Dept: PHYSICAL THERAPY | Facility: REHABILITATION | Age: 58
End: 2023-04-13
Attending: INTERNAL MEDICINE
Payer: COMMERCIAL

## 2023-04-18 ENCOUNTER — APPOINTMENT (OUTPATIENT)
Dept: PHYSICAL THERAPY | Facility: REHABILITATION | Age: 58
End: 2023-04-18
Attending: INTERNAL MEDICINE
Payer: COMMERCIAL

## 2023-04-19 ENCOUNTER — OFFICE VISIT (OUTPATIENT)
Dept: MEDICAL GROUP | Age: 58
End: 2023-04-19
Payer: COMMERCIAL

## 2023-04-19 ENCOUNTER — TELEPHONE (OUTPATIENT)
Dept: MEDICAL GROUP | Age: 58
End: 2023-04-19

## 2023-04-19 ENCOUNTER — OFFICE VISIT (OUTPATIENT)
Dept: URGENT CARE | Facility: CLINIC | Age: 58
End: 2023-04-19
Payer: COMMERCIAL

## 2023-04-19 ENCOUNTER — APPOINTMENT (OUTPATIENT)
Dept: MEDICAL GROUP | Age: 58
End: 2023-04-19
Payer: COMMERCIAL

## 2023-04-19 VITALS
WEIGHT: 204 LBS | HEART RATE: 118 BPM | BODY MASS INDEX: 36.14 KG/M2 | TEMPERATURE: 98 F | HEIGHT: 63 IN | SYSTOLIC BLOOD PRESSURE: 132 MMHG | DIASTOLIC BLOOD PRESSURE: 80 MMHG | OXYGEN SATURATION: 98 %

## 2023-04-19 VITALS
WEIGHT: 180 LBS | TEMPERATURE: 97.6 F | BODY MASS INDEX: 31.89 KG/M2 | HEART RATE: 92 BPM | SYSTOLIC BLOOD PRESSURE: 132 MMHG | OXYGEN SATURATION: 97 % | RESPIRATION RATE: 20 BRPM | HEIGHT: 63 IN | DIASTOLIC BLOOD PRESSURE: 84 MMHG

## 2023-04-19 DIAGNOSIS — I10 ESSENTIAL HYPERTENSION: ICD-10-CM

## 2023-04-19 DIAGNOSIS — J45.40 MODERATE PERSISTENT ASTHMA WITHOUT COMPLICATION: ICD-10-CM

## 2023-04-19 DIAGNOSIS — M17.0 PRIMARY OSTEOARTHRITIS OF BOTH KNEES: ICD-10-CM

## 2023-04-19 DIAGNOSIS — Z12.11 SCREENING FOR COLORECTAL CANCER: ICD-10-CM

## 2023-04-19 DIAGNOSIS — F11.20 UNCOMPLICATED OPIOID DEPENDENCE (HCC): ICD-10-CM

## 2023-04-19 DIAGNOSIS — M50.30 DDD (DEGENERATIVE DISC DISEASE), CERVICAL: ICD-10-CM

## 2023-04-19 DIAGNOSIS — Z12.12 SCREENING FOR COLORECTAL CANCER: ICD-10-CM

## 2023-04-19 DIAGNOSIS — I89.0 LYMPHEDEMA: ICD-10-CM

## 2023-04-19 DIAGNOSIS — Z23 NEED FOR VACCINATION: ICD-10-CM

## 2023-04-19 DIAGNOSIS — E55.9 VITAMIN D DEFICIENCY: ICD-10-CM

## 2023-04-19 DIAGNOSIS — Z02.9 ADMINISTRATIVE ENCOUNTER: ICD-10-CM

## 2023-04-19 DIAGNOSIS — N39.3 PRIMARY STRESS URINARY INCONTINENCE: ICD-10-CM

## 2023-04-19 PROBLEM — R60.0 LEG EDEMA: Status: RESOLVED | Noted: 2021-08-09 | Resolved: 2023-04-19

## 2023-04-19 PROBLEM — M16.0 PRIMARY OSTEOARTHRITIS OF BOTH HIPS: Status: RESOLVED | Noted: 2018-06-07 | Resolved: 2023-04-19

## 2023-04-19 PROBLEM — E83.42 HYPOMAGNESEMIA: Status: RESOLVED | Noted: 2022-06-11 | Resolved: 2023-04-19

## 2023-04-19 PROBLEM — L03.115 CELLULITIS OF RIGHT LOWER EXTREMITY: Status: RESOLVED | Noted: 2021-12-31 | Resolved: 2023-04-19

## 2023-04-19 PROBLEM — D89.2 SERUM GAMMA GLOBULIN INCREASED: Status: RESOLVED | Noted: 2022-06-08 | Resolved: 2023-04-19

## 2023-04-19 PROBLEM — J45.909 ALLERGIC BRONCHITIS WITHOUT COMPLICATION: Status: RESOLVED | Noted: 2023-01-19 | Resolved: 2023-04-19

## 2023-04-19 PROBLEM — Z00.8 ENCOUNTER FOR WORK CAPABILITY ASSESSMENT: Status: RESOLVED | Noted: 2018-08-29 | Resolved: 2023-04-19

## 2023-04-19 PROBLEM — S81.801A OPEN WOUND OF RIGHT LOWER EXTREMITY: Status: RESOLVED | Noted: 2022-04-04 | Resolved: 2023-04-19

## 2023-04-19 PROBLEM — N39.46 MIXED STRESS AND URGE URINARY INCONTINENCE: Status: RESOLVED | Noted: 2019-01-03 | Resolved: 2023-04-19

## 2023-04-19 PROCEDURE — 99213 OFFICE O/P EST LOW 20 MIN: CPT | Performed by: NURSE PRACTITIONER

## 2023-04-19 PROCEDURE — 99214 OFFICE O/P EST MOD 30 MIN: CPT | Performed by: INTERNAL MEDICINE

## 2023-04-19 ASSESSMENT — ENCOUNTER SYMPTOMS
PSYCHIATRIC NEGATIVE: 1
NEUROLOGICAL NEGATIVE: 1
CARDIOVASCULAR NEGATIVE: 1
RESPIRATORY NEGATIVE: 1
GASTROINTESTINAL NEGATIVE: 1
CONSTITUTIONAL NEGATIVE: 1
EYES NEGATIVE: 1
MUSCULOSKELETAL NEGATIVE: 1

## 2023-04-19 ASSESSMENT — FIBROSIS 4 INDEX
FIB4 SCORE: 0.98
FIB4 SCORE: 0.98
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
## 2023-04-19 NOTE — LETTER
April 19, 2023       Patient: Karine Ovalle   YOB: 1965   Date of Visit: 4/19/2023         To Whom It May Concern:    Karine Ovalle was seen in urgent care today.              Sincerely,          JUNE Vasquez  Electronically Signed

## 2023-04-19 NOTE — PROGRESS NOTES
Subjective     Karine Ovalle is a 57 y.o. female who presents with Paperwork (FMLA)    AND  The patient is here for followup of chronic medical problems listed below. The patient is compliant with medications and having no side effects from them. Denies chest pain, abdominal pain, dyspnea, myalgias, or cough.   Patient Active Problem List   Diagnosis    Vitamin B 12 deficiency    Obesity (BMI 30-39.9)    Chronic bilateral low back pain with bilateral sciatica- pain mgt;    spine nv    Venous insufficiency    Lymphedema    Uncomplicated opioid dependence (CMS-HCC)- spine nv    Essential hypertension    DDD (degenerative disc disease), lumbar    Class 1 obesity due to excess calories with serious comorbidity and body mass index (BMI) of 33.0 to 33.9 in adult- rx phentermine    DDD (degenerative disc disease), cervical    Cervical myelopathy (HCC)    Vitamin D deficiency    Primary insomnia    Primary osteoarthritis of both knees- sp right TKR 2015; left TKR tbd in future dr nowak    Administrative [id number] FMLA paperwork for workplace accommodation for chronic DDD and DJD hips and shoulders bilaterally status post multiple joint replacements    Chronic pain syndrome    Moderate persistent asthma without complication    Obesity due to excess calories with serious comorbidity    Primary stress urinary incontinence     Outpatient Medications Prior to Visit   Medication Sig Dispense Refill    azithromycin (ZITHROMAX) 250 MG Tab azithromycin 250 mg tablet   TAKE 2 TABLETS BY MOUTH ON DAY 1, AND THEN TAKE 1 TABLET BY MOUTH ONCE A DAY ON DAY 2 THROUGH DAY 5      cefdinir (OMNICEF) 300 MG Cap cefdinir 300 mg capsule   TAKE 1 CAPSULE BY MOUTH TWICE DAILY FOR 7 DAYS      cephALEXin (KEFLEX) 500 MG Cap cephalexin 500 mg capsule   TAKE 1 CAPSULE BY MOUTH FOUR TIMES DAILY X 10 DAYS      clindamycin (CLEOCIN) 300 MG Cap clindamycin HCl 300 mg capsule   TAKE 1 CAPSULE BY MOUTH THREE TIMES DAILY X 10 DAYS       gabapentin (NEURONTIN) 600 MG tablet gabapentin 600 mg tablet   TAKE 1 TABLET BY MOUTH 4 TIMES DAILY      Naloxone (NARCAN) 4 MG/0.1ML Liquid naloxone 4 mg/actuation nasal spray   CALL 911. SPR CONTENTS OF ONE SPRAYER (0.1ML) INTO ONE NOSTRIL. REPEAT IN 2-3 MIN IF SYMPTOMS OF OPIOID EMERGENCY PERSIST, ALTERNATE NOSTRILS      naproxen (NAPROSYN) 500 MG Tab naproxen 500 mg tablet   TAKE 1 TABLET BY MOUTH TWICE DAILY X 10 DAYS      morphine ER (MS CONTIN) 15 MG Tab CR tablet morphine ER 15 mg tablet,extended release   Take 1 tablet every 12 hours by oral route as directed for 30 days.   Do not drive, drink etoh while taking.      morphine ER (MS CONTIN) 30 MG Tab CR tablet morphine ER 30 mg tablet,extended release   TAKE 1 TABLET BY MOUTH EVERY 12 HOURS FOR 3 DAYS      phentermine 37.5 MG capsule Take 1 Capsule by mouth every morning for 90 days. 90 Capsule 1    albuterol 108 (90 Base) MCG/ACT Aero Soln inhalation aerosol Inhale 2 Puffs every four hours as needed for Shortness of Breath. 1 Each 11    fluticasone-salmeterol (ADVAIR) 250-50 MCG/ACT AEROSOL POWDER, BREATH ACTIVATED Inhale 1 Puff every 12 hours. 1 Each 5    amoxicillin-clavulanate (AUGMENTIN) 875-125 MG Tab Take 1 Tablet by mouth 2 times a day. 20 Tablet 1    albuterol 108 (90 Base) MCG/ACT Aero Soln inhalation aerosol Inhale 2 Puffs every 6 hours as needed for Shortness of Breath. 8.5 g 0    cetirizine (ZYRTEC ALLERGY) 10 MG Tab Take 1 Tablet by mouth every day. 30 Tablet 0    furosemide (LASIX) 40 MG Tab Take 1 Tablet by mouth 1 time a day as needed (leg edema). Patient reports only takes if leg edema increases. She doesn't use daily as prescribed.   Indications: Edema 90 Tablet 4    morphine ER (MS CONTIN) 15 MG Tab CR tablet Take 15 mg by mouth every 12 hours. Indications: Pain      potassium Chloride ER (K-TAB) 20 MEQ Tab CR tablet Take 20 mEq by mouth 1 time a day as needed (Per patient, she takes with furosemide. ). Indications: takes with  furosemide      vitamin D3 (CHOLECALCIFEROL) 1000 Unit (25 mcg) Tab Take 1,000 Units by mouth every day. Indications: Vitamin D Deficiency, dietary supplement      Biotin 5000 MCG Cap Take 1 Capsule by mouth every day. Indications: dietary supplement      gabapentin (NEURONTIN) 800 MG tablet Take 1 Tablet by mouth 3 times a day. 360 Tablet 3    methocarbamol (ROBAXIN) 500 MG Tab Take 2 Tablets by mouth 4 times a day as needed (back pain and spasms). Indications: Musculoskeletal Pain 240 Tablet 5    zinc sulfate (ZINCATE) 220 (50 Zn) MG Cap Take 220 mg by mouth every day. Indications: Impaired Wound Healing, dietary supplement       Multiple Vitamin (MULTIVITAMIN ADULT) Tab Take 1 Tablet by mouth every day. Indications: Nutritional Support      fluticasone (FLOVENT HFA) 44 MCG/ACT Aerosol Inhale 2 Puffs 2 times a day as needed (tobacco smoke). Everyday maintenance steroid inhaler 1 Each 11    ascorbic acid (VITAMIN C) 500 MG tablet Take 1 Tablet by mouth 2 times a day. 100 Tablet 4    magnesium oxide 400 (240 Mg) MG Tab Take 1 Tablet by mouth 2 times a day. 30 Tablet     oxyCODONE-acetaminophen (PERCOCET-10)  MG Tab Take 1 Tablet by mouth every 6 hours as needed for Moderate Pain or Severe Pain. Indications: breakthrough pain betweeen morphine administration  0    CALCIUM CARBONATE-VITAMIN D PO Take 1 Tablet by mouth every day. Indications: Low Amount of Calcium in the Blood      ferrous sulfate 325 (65 Fe) MG tablet Take 1 Tab by mouth every morning with breakfast. 30 Tab 1    methylPREDNISolone (MEDROL DOSEPAK) 4 MG Tablet Therapy Pack Follow schedule on package instructions. (Patient not taking: Reported on 4/19/2023) 21 Tablet 0     No facility-administered medications prior to visit.     Lab Results   Component Value Date/Time    HBA1C 4.7 06/02/2022 02:46 AM    HBA1C 5.1 07/26/2021 11:14 AM     Lab Results   Component Value Date/Time    SODIUM 137 09/13/2022 02:40 PM    POTASSIUM 4.0 09/13/2022 02:40 PM     CHLORIDE 103 09/13/2022 02:40 PM    CO2 24 09/13/2022 02:40 PM    GLUCOSE 100 (H) 09/13/2022 02:40 PM    BUN 9 09/13/2022 02:40 PM    CREATININE 0.61 09/13/2022 02:40 PM    CREATININE 0.88 08/14/2010 12:00 AM    BUNCREATRAT 13 08/14/2010 12:00 AM    GLOMRATE >59 08/14/2010 12:00 AM    ALKPHOSPHAT 144 (H) 09/13/2022 02:40 PM    ASTSGOT 25 09/13/2022 02:40 PM    ALTSGPT 12 09/13/2022 02:40 PM    TBILIRUBIN 0.3 09/13/2022 02:40 PM     Lab Results   Component Value Date/Time    INR 1.25 (H) 06/01/2022 03:30 PM    INR 1.00 02/13/2019 11:44 AM    INR 1.01 12/04/2018 11:15 AM     Lab Results   Component Value Date/Time    CHOLSTRLTOT 147 07/26/2021 11:14 AM    LDL 63 07/26/2021 11:14 AM    HDL 75 07/26/2021 11:14 AM    TRIGLYCERIDE 44 07/26/2021 11:14 AM       No results found for: TESTOSTERONE  Lab Results   Component Value Date/Time    TSH 2.110 08/14/2010 12:00 AM     Lab Results   Component Value Date/Time    FREET4 0.72 05/03/2016 07:32 AM     Lab Results   Component Value Date/Time    URICACID 5.6 08/12/2014 04:49 PM     No components found for: VITB12  Lab Results   Component Value Date/Time    25HYDROXY 44 06/02/2022 02:42 PM    25HYDROXY 50 07/26/2021 11:14 AM     No visits with results within 1 Month(s) from this visit.   Latest known visit with results is:   Hospital Outpatient Visit on 09/13/2022   Component Date Value    Sodium 09/13/2022 137     Potassium 09/13/2022 4.0     Chloride 09/13/2022 103     Co2 09/13/2022 24     Anion Gap 09/13/2022 10.0     Glucose 09/13/2022 100 (H)     Bun 09/13/2022 9     Creatinine 09/13/2022 0.61     Calcium 09/13/2022 9.2     AST(SGOT) 09/13/2022 25     ALT(SGPT) 09/13/2022 12     Alkaline Phosphatase 09/13/2022 144 (H)     Total Bilirubin 09/13/2022 0.3     Albumin 09/13/2022 3.5     Total Protein 09/13/2022 7.5     Globulin 09/13/2022 4.0 (H)     A-G Ratio 09/13/2022 0.9     WBC 09/13/2022 5.8     RBC 09/13/2022 4.42     Hemoglobin 09/13/2022 12.4     Hematocrit  "09/13/2022 36.1 (L)     MCV 09/13/2022 81.7     MCH 09/13/2022 28.1     MCHC 09/13/2022 34.3     RDW 09/13/2022 41.2     Platelet Count 09/13/2022 419     MPV 09/13/2022 9.8     Neutrophils-Polys 09/13/2022 48.40     Lymphocytes 09/13/2022 38.40     Monocytes 09/13/2022 8.00     Eosinophils 09/13/2022 4.30     Basophils 09/13/2022 0.70     Immature Granulocytes 09/13/2022 0.20     Nucleated RBC 09/13/2022 0.00     Neutrophils (Absolute) 09/13/2022 2.83     Lymphs (Absolute) 09/13/2022 2.24     Monos (Absolute) 09/13/2022 0.47     Eos (Absolute) 09/13/2022 0.25     Baso (Absolute) 09/13/2022 0.04     Immature Granulocytes (a* 09/13/2022 0.01     NRBC (Absolute) 09/13/2022 0.00     Sed Rate Westergren 09/13/2022 17     Stat C-Reactive Protein 09/13/2022 1.72 (H)     GFR (CKD-EPI) 09/13/2022 104            HPI    Review of Systems   Constitutional: Negative.    HENT: Negative.     Eyes: Negative.    Respiratory: Negative.     Cardiovascular: Negative.    Gastrointestinal: Negative.    Genitourinary: Negative.    Musculoskeletal: Negative.    Skin: Negative.    Neurological: Negative.    Endo/Heme/Allergies: Negative.    Psychiatric/Behavioral: Negative.              Objective     /80 (BP Location: Left arm, Patient Position: Sitting, BP Cuff Size: Adult)   Pulse (!) 118   Temp 36.7 °C (98 °F) (Temporal)   Ht 1.6 m (5' 3\")   Wt 92.5 kg (204 lb)   LMP  (LMP Unknown)   SpO2 98%   BMI 36.14 kg/m²      Physical Exam  Vitals reviewed.   Constitutional:       General: She is not in acute distress.     Appearance: She is well-developed. She is not diaphoretic.   HENT:      Head: Normocephalic and atraumatic.      Right Ear: External ear normal.      Left Ear: External ear normal.      Nose: Nose normal.      Mouth/Throat:      Pharynx: No oropharyngeal exudate.   Eyes:      General: No scleral icterus.        Right eye: No discharge.         Left eye: No discharge.      Conjunctiva/sclera: Conjunctivae normal.     "  Pupils: Pupils are equal, round, and reactive to light.   Neck:      Thyroid: No thyromegaly.      Vascular: No JVD.      Trachea: No tracheal deviation.   Cardiovascular:      Rate and Rhythm: Normal rate and regular rhythm.      Heart sounds: Normal heart sounds. No murmur heard.    No friction rub. No gallop.   Pulmonary:      Effort: Pulmonary effort is normal. No respiratory distress.      Breath sounds: Normal breath sounds. No stridor. No wheezing or rales.   Chest:      Chest wall: No tenderness.   Abdominal:      General: Bowel sounds are normal. There is no distension.      Palpations: Abdomen is soft. There is no mass.      Tenderness: There is no abdominal tenderness. There is no guarding or rebound.   Musculoskeletal:         General: No tenderness. Normal range of motion.      Cervical back: Normal range of motion and neck supple.   Lymphadenopathy:      Cervical: No cervical adenopathy.   Skin:     General: Skin is warm and dry.      Coloration: Skin is not pale.      Findings: No erythema or rash.   Neurological:      Mental Status: She is alert and oriented to person, place, and time.      Cranial Nerves: No cranial nerve deficit.      Motor: No abnormal muscle tone.      Coordination: Coordination normal.      Deep Tendon Reflexes: Reflexes are normal and symmetric. Reflexes normal.   Psychiatric:         Behavior: Behavior normal.         Thought Content: Thought content normal.         Judgment: Judgment normal.                           Assessment & Plan        1. Administrative [id number] [facility] paperwork for workplace accommodation for chronic DDD and DJD hips and shoulders bilaterally status post multiple joint replacements  Paperwork completed.  Patient needs another 6 months of working from home due to the inability to work in the office workplace environment due to her severe degenerative arthritis of her knee.  Follow-up with us every 4 months and with orthopedic surgeon every 3  to 4 months    2. Essential hypertension  Under good control continue Mediterranean diet low-salt diet    3. Primary osteoarthritis of both knees- sp right TKR 2015; left TKR tbd 2021 or 2022- dr nowak-still quite symptomatic.  Follow-up with orthopedic surgery.    4. Moderate persistent asthma without complication  Under good control continue current regimen with albuterol inhaler every 4 hours as needed, and Flovent 44 mcg per actuation 2 puffs twice daily.    5. Vitamin D deficiency  Good control continue vitamin D3 2000 units daily.    6. Primary stress urinary incontinence  Needs Kegel exercises and other physical therapy for this condition before considering surgery.  - Referral to Physical Therapy    7. Lymphedema  Continue follow-up with lymphedema clinic.  Elevate legs and compression stockings    8. DDD (degenerative disc disease), cervical  Good control continue current exercises.  No surgery at this time.    9. Screening for colorectal cancer     - Referral to GI for Colonoscopy  - COLOGUARD (FIT DNA)    10. Need for vaccination     - Hepatitis B Vaccine Adult 20+  - Zoster Vac Recomb Adjuvanted (SHINGRIX) 50 MCG/0.5ML Recon Susp; Inject 0.5 mL into the shoulder, thigh, or buttocks one time for 1 dose.  Dispense: 0.5 mL; Refill: 0  Needs COVID-19 by Valent booster

## 2023-04-19 NOTE — LETTER
April 19, 2023    To Whom It May Concern:         This is confirmation that Karine Ovalle is currently under my care , please excuse her for today 04/19/2023 she was seen in office . She may return to tomorrow 04/20/2023 .          If you have any questions please do not hesitate to call me at the phone number listed below.    Sincerely,          Micky Rubin M.D   [phone]

## 2023-04-19 NOTE — PROGRESS NOTES
"Karine Ovalle is a 57 y.o. female who presents for Other (Requesting a note similar to the [facility] clinic from visit: 4/17/22, to work at home)      HPI  This is a new problem. Karine Ovalle is a 57 y.o. patient who presents to urgent care with c/o: here today requesting a note. Trying to get FMLA forms. Has appointment with her PCP in July. Wants a note that she was seen here today.   Was seen at [facility] and received a steroid knee injection on 04/17/23 by Dr. Levine. Says that her pain improves.   Uses chronic pain medications to control her pain.   Treatments tried: normal medications.    No other aggravating or alleviating factors.       ROS See HPI    Allergies:       Allergies   Allergen Reactions    Tobacco [Nicotiana Tabacum] Shortness of Breath     Cigarette smoke causes SOB, rispatory issues    Other Environmental      Cigarette smoke       PMSFS Hx:  Past Medical History:   Diagnosis Date    Administrative encounter- RTC ACCESS/ADA PARATRANSIT ELIGIBILITY 6/4/2019    Anemia     Anemia, chronic disease 6/2/2022    Arthritis     osteo/hips and back    At risk for falls     Chronic back pain     Dental disorder     lower denture    Edema 12/13/2018    History of gastric bypass 4/25/2012    Hypokalemia 6/7/2018    Hyponatremia 5/18/2012    Left hip pain 9/18/2018    Leukocytosis 4/3/2022    Microcytic anemia- postop THR 2/2019 12/13/2018    Mild intermittent asthma with acute exacerbation 4/25/2012    Multiple closed fractures of facial bone (HCC) 6/1/2022    Near syncope 6/1/2022    Pain 12/04/2018    \"ALL OVER\", 10/10    Pain 02/04/2019    arthritic pain    S/P hip replacement, bilateral 2/13/2019    Sepsis (HCC) 4/3/2022    Urinary incontinence     using pads     Past Surgical History:   Procedure Laterality Date    HIP ARTHROPLASTY TOTAL Left 2/13/2019    Procedure: HIP ARTHROPLASTY TOTAL, Cabling of intra-operative calcar fracture;  Surgeon: Bryon Levine M.D.;  Location: SURGERY [facility];  " "Service: Orthopedics    CERVICAL FUSION POSTERIOR  12/7/2018    Procedure: CERVICAL FUSION POSTERIOR- STAGE #2 C3-5 AND C3-T1;  Surgeon: Emre Mendoza III, M.D.;  Location: SURGERY [facility];  Service: Neurosurgery    CERVICAL LAMINECTOMY POSTERIOR  12/7/2018    Procedure: CERVICAL LAMINECTOMY POSTERIOR C2-T1;  Surgeon: Emre Mendoza III, M.D.;  Location: SURGERY [facility];  Service: Neurosurgery    CERVICAL DISK AND FUSION ANTERIOR  12/5/2018    Procedure: CERVICAL DISK AND FUSION ANTERIOR-STAGE #1 C4-5;  Surgeon: Emre Mendoza III, M.D.;  Location: SURGERY [facility];  Service: Neurosurgery    CORPECTOMY  12/5/2018    Procedure: CORPECTOMY;  Surgeon: Emre Mendoza III, M.D.;  Location: SURGERY [facility];  Service: Neurosurgery    HIP ARTHROPLASTY TOTAL Right 3/20/2018    Procedure: HIP ARTHROPLASTY TOTAL;  Surgeon: Bryon Levine M.D.;  Location: SURGERY [facility];  Service: Orthopedics    KNEE ARTHROPLASTY TOTAL Right 10/20/2015    Procedure: KNEE ARTHROPLASTY TOTAL;  Surgeon: Bryon Levine M.D.;  Location: SURGERY [facility];  Service:     APPENDECTOMY LAPAROSCOPIC  3/21/2013    Performed by Dali Queen M.D. at [facility]    DEBRIDEMENT  5/17/2012    Performed by BRADLEY DYKES at [facility]    PLASTIC SURGERY  2004    excess skin on arms and legs removed    MAMMOPLASTY AUGMENTATION Bilateral 2004    breast implants and \"tummy tuck\"    GASTRIC BYPASS LAPAROSCOPIC  2002    x 2    ABDOMINAL EXPLORATION      OTHER      breast augmentation 2004    OTHER      Gastric bypass 2002     Family History   Problem Relation Age of Onset    Diabetes Mother     Heart Disease Mother      Social History     Tobacco Use    Smoking status: Never    Smokeless tobacco: Never   Substance Use Topics    Alcohol use: Not Currently     Comment: 3-4 per week; pt stops alcohol use 1-week ago       Problems:   Patient Active Problem List   Diagnosis    Vitamin B 12 " deficiency    Obesity (BMI 30-39.9)    Chronic bilateral low back pain with bilateral sciatica- pain mgt;    spine nv    Venous insufficiency    Lymphedema    Uncomplicated opioid dependence (CMS-HCC)- spine nv    Essential hypertension    DDD (degenerative disc disease), lumbar    Primary osteoarthritis of both hips- dr nowak; left THR done feb 6, 2019; right THR 3/2018    Class 1 obesity due to excess calories with serious comorbidity and body mass index (BMI) of 33.0 to 33.9 in adult- rx phentermine    DDD (degenerative disc disease), cervical- [facility] [place]- dr brennan; fusion and laminectomy C3-T1 12/5/2018 dr brennan    Encounter for work capability assessment- FMLA PAPERWORK    Cervical myelopathy (HCC)    Vitamin D deficiency    Primary insomnia    Mixed stress and urge urinary incontinence    Primary osteoarthritis of both knees- sp right TKR 2015; left TKR tbd 2021 or 2022- dr nowak    Administrative [id number] FMLA paperwork for workplace accommodation for chronic DDD and DJD hips and shoulders bilaterally status post multiple joint replacements    Leg edema    Cellulitis of right lower extremity    Open wound of right lower extremity- needs wound vac    Chronic pain syndrome    Moderate persistent asthma without complication    Serum gamma globulin increased    Hypomagnesemia    Allergic bronchitis without complication    Obesity due to excess calories with serious comorbidity       Medications:   Current Outpatient Medications on File Prior to Visit   Medication Sig Dispense Refill    cetirizine (ZYRTEC ALLERGY) 10 MG Tab Take 1 Tablet by mouth every day. 30 Tablet 0    furosemide (LASIX) 40 MG Tab Take 1 Tablet by mouth 1 time a day as needed (leg edema). Patient reports only takes if leg edema increases. She doesn't use daily as prescribed.   Indications: Edema 90 Tablet 4    potassium Chloride ER (K-TAB) 20 MEQ Tab CR tablet Take 20 mEq by mouth 1 time a day as needed (Per patient, she  takes with furosemide. ). Indications: takes with furosemide      vitamin D3 (CHOLECALCIFEROL) 1000 Unit (25 mcg) Tab Take 1,000 Units by mouth every day. Indications: Vitamin D Deficiency, dietary supplement      Biotin 5000 MCG Cap Take 1 Capsule by mouth every day. Indications: dietary supplement      gabapentin (NEURONTIN) 800 MG tablet Take 1 Tablet by mouth 3 times a day. 360 Tablet 3    methocarbamol (ROBAXIN) 500 MG Tab Take 2 Tablets by mouth 4 times a day as needed (back pain and spasms). Indications: Musculoskeletal Pain 240 Tablet 5    zinc sulfate (ZINCATE) 220 (50 Zn) MG Cap Take 220 mg by mouth every day. Indications: Impaired Wound Healing, dietary supplement       ascorbic acid (VITAMIN C) 500 MG tablet Take 1 Tablet by mouth 2 times a day. 100 Tablet 4    magnesium oxide 400 (240 Mg) MG Tab Take 1 Tablet by mouth 2 times a day. 30 Tablet     CALCIUM CARBONATE-VITAMIN D PO Take 1 Tablet by mouth every day. Indications: Low Amount of Calcium in the Blood      ferrous sulfate 325 (65 Fe) MG tablet Take 1 Tab by mouth every morning with breakfast. 30 Tab 1    azithromycin (ZITHROMAX) 250 MG Tab azithromycin 250 mg tablet   TAKE 2 TABLETS BY MOUTH ON DAY 1, AND THEN TAKE 1 TABLET BY MOUTH ONCE A DAY ON DAY 2 THROUGH DAY 5      cefdinir (OMNICEF) 300 MG Cap cefdinir 300 mg capsule   TAKE 1 CAPSULE BY MOUTH TWICE DAILY FOR 7 DAYS      cephALEXin (KEFLEX) 500 MG Cap cephalexin 500 mg capsule   TAKE 1 CAPSULE BY MOUTH FOUR TIMES DAILY X 10 DAYS      clindamycin (CLEOCIN) 300 MG Cap clindamycin HCl 300 mg capsule   TAKE 1 CAPSULE BY MOUTH THREE TIMES DAILY X 10 DAYS      gabapentin (NEURONTIN) 600 MG tablet gabapentin 600 mg tablet   TAKE 1 TABLET BY MOUTH 4 TIMES DAILY      Naloxone (NARCAN) 4 MG/0.1ML Liquid naloxone 4 mg/actuation nasal spray   CALL 911. SPR CONTENTS OF ONE SPRAYER (0.1ML) INTO ONE NOSTRIL. REPEAT IN 2-3 MIN IF SYMPTOMS OF OPIOID EMERGENCY PERSIST, ALTERNATE NOSTRILS      naproxen  (NAPROSYN) 500 MG Tab naproxen 500 mg tablet   TAKE 1 TABLET BY MOUTH TWICE DAILY X 10 DAYS      morphine ER (MS CONTIN) 15 MG Tab CR tablet morphine ER 15 mg tablet,extended release   Take 1 tablet every 12 hours by oral route as directed for 30 days.   Do not drive, drink etoh while taking.      morphine ER (MS CONTIN) 30 MG Tab CR tablet morphine ER 30 mg tablet,extended release   TAKE 1 TABLET BY MOUTH EVERY 12 HOURS FOR 3 DAYS      phentermine 37.5 MG capsule Take 1 Capsule by mouth every morning for 90 days. 90 Capsule 1    albuterol 108 (90 Base) MCG/ACT Aero Soln inhalation aerosol Inhale 2 Puffs every four hours as needed for Shortness of Breath. 1 Each 11    fluticasone-salmeterol (ADVAIR) 250-50 MCG/ACT AEROSOL POWDER, BREATH ACTIVATED Inhale 1 Puff every 12 hours. 1 Each 5    amoxicillin-clavulanate (AUGMENTIN) 875-125 MG Tab Take 1 Tablet by mouth 2 times a day. 20 Tablet 1    albuterol 108 (90 Base) MCG/ACT Aero Soln inhalation aerosol Inhale 2 Puffs every 6 hours as needed for Shortness of Breath. 8.5 g 0    methylPREDNISolone (MEDROL DOSEPAK) 4 MG Tablet Therapy Pack Follow schedule on package instructions. 21 Tablet 0    morphine ER (MS CONTIN) 15 MG Tab CR tablet Take 15 mg by mouth every 12 hours. Indications: Pain      Multiple Vitamin (MULTIVITAMIN ADULT) Tab Take 1 Tablet by mouth every day. Indications: Nutritional Support      fluticasone (FLOVENT HFA) 44 MCG/ACT Aerosol Inhale 2 Puffs 2 times a day as needed (tobacco smoke). Everyday maintenance steroid inhaler 1 Each 11    oxyCODONE-acetaminophen (PERCOCET-10)  MG Tab Take 1 Tablet by mouth every 6 hours as needed for Moderate Pain or Severe Pain. Indications: breakthrough pain betweeen morphine administration  0     No current facility-administered medications on file prior to visit.          Objective:     /84 (BP Location: Left arm, Patient Position: Sitting, BP Cuff Size: Adult)   Pulse 92   Temp 36.4 °C (97.6 °F)  "(Temporal)   Resp 20   Ht 1.6 m (5' 3\")   Wt 81.6 kg (180 lb)   LMP  (LMP Unknown)   SpO2 97%   BMI 31.89 kg/m²     Physical Exam  Vitals and nursing note reviewed.   Constitutional:       Appearance: Normal appearance.      Comments: Chronically ill appearing    Cardiovascular:      Rate and Rhythm: Normal rate.      Comments: Bilateral lower edema visible.   Pulmonary:      Effort: Pulmonary effort is normal.   Musculoskeletal:      Comments: Ambulates with cane    Skin:     General: Skin is warm.      Capillary Refill: Capillary refill takes less than 2 seconds.   Neurological:      Mental Status: She is alert and oriented to person, place, and time.   Psychiatric:         Mood and Affect: Mood normal.         Behavior: Behavior normal.         Thought Content: Thought content normal.         Assessment /Associated Orders:      1. Primary osteoarthritis of both knees- sp right TKR 2015; left TKR tbd 2021 or 2022- dr nowak        2. Uncomplicated opioid dependence (CMS-[facility])- spine nv              Medical Decision Making:    Pt is clinically stable at today's acute urgent care visit.  No acute distress noted. Appropriate for outpatient care at this time.   Acute problem today with uncertain prognosis.   FU with LIZZ  Given work note that she was seen today in urgent care.   Resume all prior  RX medications. Take as prescribed.      Return to urgent care prn if new or worsening sx or if there is no improvement in condition prn.    Educated in Red flags and indications to immediately call 911 or present to the Emergency Department.       Time I spent evaluating Karine Ovalle in urgent care today was 23  minutes. This time includes preparing for visit, reviewing any pertinent notes or test results, counseling/education, exam, obtaining HPI, interpretation of lab tests, medication management and documentation as indicated above.Time does not include separately billable procedures noted .       Please note " that this dictation was created using voice recognition software. I have worked with consultants from the vendor as well as technical experts from [facility] to optimize the interface. I have made every reasonable attempt to correct obvious errors, but I expect that there are errors of grammar and possibly content that I did not discover before finalizing the note.  This note was electronically signed by provider

## 2023-04-25 ENCOUNTER — APPOINTMENT (OUTPATIENT)
Dept: PHYSICAL THERAPY | Facility: REHABILITATION | Age: 58
End: 2023-04-25
Attending: INTERNAL MEDICINE
Payer: COMMERCIAL

## 2023-05-01 ENCOUNTER — APPOINTMENT (OUTPATIENT)
Dept: PHYSICAL THERAPY | Facility: REHABILITATION | Age: 58
End: 2023-05-01
Attending: NURSE PRACTITIONER
Payer: COMMERCIAL

## 2023-05-01 NOTE — OP THERAPY DAILY TREATMENT
Outpatient Physical Therapy  LYMPHEDEMA THERAPY DAILY TREATMENT     [facility] Physical Therapy [address]  [address]  [place] [address]  Phone:  [phone]  Fax:  [phone]    Date: 05/01/2023    Patient: Karine Ovalle  YOB: 1965  MRN: [id number]     Time Calculation                   Chief Complaint: No chief complaint on file.    Visit #: 3    Subjective    Lymphedema Objective    Left Lower Extremity Circumferential Measurements EVAL 4/6  Waist: cm  Hip: cm  Scrotum: cm  Ground/Upper Thigh: cm  Mid Thigh: 62.7 cm  Knee: 53.2 cm  Upper Calf: 49.3 cm  Mid Calf: 36.7 cm  Ankle: 28.8 cm  Heel to Foot: 22.7 cm  Total: 253.4 cm     Right Lower Extremity Circumferential Measurements EVAL 4/6  Waist: cm  Hip: cm  Scrotum: cm  Ground/Upper Thigh: cm  Mid Thigh: 62.5 cm  Knee: 51.2 cm  Upper Calf: 41.3 (at base of scar below larger tissue) cm  Mid Calf: 32 cm  Ankle: 27.3 cm  Heel to Foot: 21.4 cm  Total: 235.7 cm  Exercises/Treatment  Time-based treatments/modalities:           LYMPHEDEMA ASSESSMENT AND PLAN

## 2023-05-02 ENCOUNTER — APPOINTMENT (OUTPATIENT)
Dept: PHYSICAL THERAPY | Facility: REHABILITATION | Age: 58
End: 2023-05-02
Attending: INTERNAL MEDICINE
Payer: COMMERCIAL

## 2023-05-08 ENCOUNTER — PHYSICAL THERAPY (OUTPATIENT)
Dept: PHYSICAL THERAPY | Facility: REHABILITATION | Age: 58
End: 2023-05-08
Attending: NURSE PRACTITIONER
Payer: COMMERCIAL

## 2023-05-08 DIAGNOSIS — I89.0 LYMPHEDEMA OF EXTREMITY: ICD-10-CM

## 2023-05-08 PROCEDURE — 97535 SELF CARE MNGMENT TRAINING: CPT

## 2023-05-08 NOTE — OP THERAPY DAILY TREATMENT
Outpatient Physical Therapy  LYMPHEDEMA THERAPY DAILY TREATMENT     [facility] Physical Therapy [address]  [address]  [place] [address]  Phone:  [phone]  Fax:  [phone]    Date: 05/08/2023    Patient: Karine Ovalle  YOB: 1965  MRN: [id number]     Time Calculation    Start time: 1633  Stop time: 1719 Time Calculation (min): 46 minutes         Chief Complaint: Lipolymphedema    Visit #: 3    Subjective    Lymphedema Objective    Left Lower Extremity Circumferential Measurements  Waist: cm  Hip: cm  Scrotum: cm  Ground/Upper Thigh: cm  Mid Thigh: 56 cm  Knee: 44.6 cm  Upper Calf: 43.5 cm  Mid Calf: 32.7 cm  Ankle: 27.5 cm  Heel to Foot: 21.6 cm  Total: 225.9 cm    Right Lower Extremity Circumferential Measurements  Waist: cm  Hip: cm  Scrotum: cm  Ground/Upper Thigh: cm  Mid Thigh: 52.5 cm  Knee: 42.8 cm  Upper Calf: 39.3 cm  Mid Calf: 30.3 cm  Ankle: 26.1 cm  Heel to Foot: 20.3 cm  Total: 211.3 cm      Left Lower Extremity Circumferential Measurements EVAL 4/6  Waist: cm  Hip: cm  Scrotum: cm  Ground/Upper Thigh: cm  Mid Thigh: 62.7 cm  Knee: 53.2 cm  Upper Calf: 49.3 cm  Mid Calf: 36.7 cm  Ankle: 28.8 cm  Heel to Foot: 22.7 cm  Total: 253.4 cm     Right Lower Extremity Circumferential Measurements EVAL 4/6  Waist: cm  Hip: cm  Scrotum: cm  Ground/Upper Thigh: cm  Mid Thigh: 62.5 cm  Knee: 51.2 cm  Upper Calf: 41.3 (at base of scar below larger tissue) cm  Mid Calf: 32 cm  Ankle: 27.3 cm  Heel to Foot: 21.4 cm  Total: 235.7 cm    Therapeutic Treatments and Modalities:     1. Self Care ADL Training (CPT 53094)    Therapeutic Treatment and Modalities Summary: -discussed brand/sizing information for compression leggings.   -discussed likelihood that with current measurements she may not require full CDT at this time.   -discussed current exercise program; encouraged pt to continue with this  Time-based treatments/modalities:    Physical Therapy Timed Treatment  Charges  Functional training, self care minutes (CPT 11484): 46 minutes      Assessment and Plan:   Assessment details:  Karine has reduced her B LE by 30cm each. She has initiated an exercise program and has been intermittently wearing her Circaids. She will benefit from more consistent application of compression, likely leggings. Assisted pt Karine with brand/sizing information for lightly compression leggings. Should this current reduction be short-lived, will likely initiate CDT.    Plan:  Therapy options:  Physical therapy treatment to continue  Discussed with:  Patient

## 2023-05-09 ENCOUNTER — APPOINTMENT (OUTPATIENT)
Dept: PHYSICAL THERAPY | Facility: REHABILITATION | Age: 58
End: 2023-05-09
Attending: INTERNAL MEDICINE
Payer: COMMERCIAL

## 2023-05-09 NOTE — OP THERAPY PROGRESS SUMMARY
Outpatient Physical Therapy  PROGRESS SUMMARY NOTE      [facility] Physical Therapy [address]  [address]  [place] [address]  Phone:  [phone]  Fax:  [phone]    Date of Visit: 05/08/2023    Patient: Karine Ovalle  YOB: 1965  MRN: [id number]     Referring Provider: [address] [address]  [place] [address]   Referring Diagnosis Lymphedema, not elsewhere classified [I89.0]     Visit Diagnoses     ICD-10-CM   1. Lymphedema of extremity  I89.0       Rehab Potential: fair    Progress Report Period: 4/6/23-5/8/23          Objective Findings and Assessment:   Patient progression towards goals: Karine has been seen for a single follow up since her initial evaluation and has therefore not had the opportunity to meet all her goals. However, she has made some personal changes, including increasing her exercise, which has greatly improved the fluid removal to her B LE. She will benefit from skilled intervention to ensure she can maintain these results with use of appropriate compression and techniques to continue preventing fluid stagnation.     Objective findings and assessment details: Left Lower Extremity Circumferential Measurements 5/8  Mid Thigh: 56 cm  Knee: 44.6 cm  Upper Calf: 43.5 cm  Mid Calf: 32.7 cm  Ankle: 27.5 cm  Heel to Foot: 21.6 cm  Total: 225.9 cm     Right Lower Extremity Circumferential Measurements 5/8  Mid Thigh: 52.5 cm  Knee: 42.8 cm  Upper Calf: 39.3 cm  Mid Calf: 30.3 cm  Ankle: 26.1 cm  Heel to Foot: 20.3 cm  Total: 211.3 cm       Left Lower Extremity Circumferential Measurements EVAL 4/6  Mid Thigh: 62.7 cm  Knee: 53.2 cm  Upper Calf: 49.3 cm  Mid Calf: 36.7 cm  Ankle: 28.8 cm  Heel to Foot: 22.7 cm  Total: 253.4 cm     Right Lower Extremity Circumferential Measurements EVAL 4/6  Mid Thigh: 62.5 cm  Knee: 51.2 cm  Upper Calf: 41.3 (at base of scar below larger tissue) cm  Mid Calf: 32 cm  Ankle: 27.3 cm  Heel to Foot: 21.4  cm  Total: 235.7 cm    Goals:   Short Term Goals:     1. Pt will achieve a total limb circumference reduction of 5cm in order to decrease risk for infection and progression of disease process. Goal Met  2. Pt will be independent with self-MLD to facilitate fluid migration to healthy tissues and lymph nodes. Goal Partially Met with pump  3. Pt will consistently perform exercises with bandages on to facilitate muscle pump of fluid from affected extremity. Goal Partially met- no compression    Short term goal time span:  2-4 weeks      Long Term Goals:    1. Pt will have a reduction in total limb circumference of 10cm in order to decrease risk for infection and progression of disease process. Goal Met  2. Patient will be independent with maintenance phase management of lymphedema including: compression garments, skin care education, risk reduction strategies, manual lymphatic drainage, and therapeutic exercise. Goal Not Met    New Goal:   3. Pt will sustain her current circumferential measurements within 5cm total at each B LE to ensure good control over her B LE swelling.   Long term goal time span:  4-6 weeks    Plan:   Planned therapy interventions:  Functional Training, Self Care (CPT 70449), Manual Therapy (CPT 98308), Therapeutic Activities (CPT 05311) and Therapeutic Exercise (CPT 08414)  Frequency:  2x week  Duration in weeks:  8    Referring provider co-signature:  I have reviewed this plan of care and my co-signature certifies the need for services.     Certification Period: 05/08/2023 to 07/03/23    Physician Signature: ________________________________ Date: ______________

## 2023-05-12 ENCOUNTER — OFFICE VISIT (OUTPATIENT)
Dept: SLEEP MEDICINE | Facility: MEDICAL CENTER | Age: 58
End: 2023-05-12
Attending: INTERNAL MEDICINE
Payer: COMMERCIAL

## 2023-05-12 VITALS
BODY MASS INDEX: 35.79 KG/M2 | OXYGEN SATURATION: 96 % | DIASTOLIC BLOOD PRESSURE: 80 MMHG | WEIGHT: 202 LBS | HEIGHT: 63 IN | HEART RATE: 105 BPM | SYSTOLIC BLOOD PRESSURE: 136 MMHG

## 2023-05-12 DIAGNOSIS — J45.30 MILD PERSISTENT ASTHMA WITHOUT COMPLICATION: ICD-10-CM

## 2023-05-12 PROCEDURE — 99203 OFFICE O/P NEW LOW 30 MIN: CPT | Performed by: INTERNAL MEDICINE

## 2023-05-12 PROCEDURE — 1125F AMNT PAIN NOTED PAIN PRSNT: CPT | Performed by: INTERNAL MEDICINE

## 2023-05-12 PROCEDURE — 99213 OFFICE O/P EST LOW 20 MIN: CPT | Performed by: INTERNAL MEDICINE

## 2023-05-12 PROCEDURE — 3075F SYST BP GE 130 - 139MM HG: CPT | Performed by: INTERNAL MEDICINE

## 2023-05-12 PROCEDURE — 3079F DIAST BP 80-89 MM HG: CPT | Performed by: INTERNAL MEDICINE

## 2023-05-12 ASSESSMENT — ENCOUNTER SYMPTOMS
GASTROINTESTINAL NEGATIVE: 1
COUGH: 1
EYES NEGATIVE: 1
PSYCHIATRIC NEGATIVE: 1
SHORTNESS OF BREATH: 1
NEUROLOGICAL NEGATIVE: 1

## 2023-05-12 ASSESSMENT — FIBROSIS 4 INDEX: FIB4 SCORE: 0.98

## 2023-05-12 NOTE — LETTER
[facility] PULMONARY [facility] - OPERATED BY [address]  [place] [address]  [phone]     May 12, 2023    Patient: Karine Ovalle   YOB: 1965   Date of Visit: 5/12/2023       To Whom It May Concern:    Karine Ovalle was seen and treated in our department on 5/12/2023.    Sincerely,     Clare Escobar M.D.
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
## 2023-05-15 ENCOUNTER — APPOINTMENT (OUTPATIENT)
Dept: PHYSICAL THERAPY | Facility: REHABILITATION | Age: 58
End: 2023-05-15
Attending: NURSE PRACTITIONER
Payer: COMMERCIAL

## 2023-05-15 NOTE — OP THERAPY DAILY TREATMENT
Outpatient Physical Therapy  LYMPHEDEMA THERAPY DAILY TREATMENT     [facility] [address]  [address]  [place] [address]  Phone:  [phone]  Fax:  [phone]    Date: 05/15/2023    Patient: Karine Ovalle  YOB: 1965  MRN: [id number]     Time Calculation                   Chief Complaint: No chief complaint on file.    Visit #: 4    Subjective    Lymphedema Objective    Left Lower Extremity Circumferential Measurements 5/8  Waist: cm  Hip: cm  Scrotum: cm  Ground/Upper Thigh: cm  Mid Thigh: 56 cm  Knee: 44.6 cm  Upper Calf: 43.5 cm  Mid Calf: 32.7 cm  Ankle: 27.5 cm  Heel to Foot: 21.6 cm  Total: 225.9 cm     Right Lower Extremity Circumferential Measurements 5/8  Waist: cm  Hip: cm  Scrotum: cm  Ground/Upper Thigh: cm  Mid Thigh: 52.5 cm  Knee: 42.8 cm  Upper Calf: 39.3 cm  Mid Calf: 30.3 cm  Ankle: 26.1 cm  Heel to Foot: 20.3 cm  Total: 211.3 cm       Left Lower Extremity Circumferential Measurements EVAL 4/6  Waist: cm  Hip: cm  Scrotum: cm  Ground/Upper Thigh: cm  Mid Thigh: 62.7 cm  Knee: 53.2 cm  Upper Calf: 49.3 cm  Mid Calf: 36.7 cm  Ankle: 28.8 cm  Heel to Foot: 22.7 cm  Total: 253.4 cm     Right Lower Extremity Circumferential Measurements EVAL 4/6  Waist: cm  Hip: cm  Scrotum: cm  Ground/Upper Thigh: cm  Mid Thigh: 62.5 cm  Knee: 51.2 cm  Upper Calf: 41.3 (at base of scar below larger tissue) cm  Mid Calf: 32 cm  Ankle: 27.3 cm  Heel to Foot: 21.4 cm  Total: 235.7 cm  Exercises/Treatment  Time-based treatments/modalities:           LYMPHEDEMA ASSESSMENT AND PLAN

## 2023-05-17 ENCOUNTER — APPOINTMENT (OUTPATIENT)
Dept: PHYSICAL THERAPY | Facility: REHABILITATION | Age: 58
End: 2023-05-17
Attending: NURSE PRACTITIONER
Payer: COMMERCIAL

## 2023-05-22 ENCOUNTER — APPOINTMENT (OUTPATIENT)
Dept: PHYSICAL THERAPY | Facility: REHABILITATION | Age: 58
End: 2023-05-22
Attending: NURSE PRACTITIONER
Payer: COMMERCIAL

## 2023-05-22 NOTE — OP THERAPY DAILY TREATMENT
Outpatient Physical Therapy  LYMPHEDEMA THERAPY DAILY TREATMENT     [facility] [address]  [address]  [place] [address]  Phone:  [phone]  Fax:  [phone]    Date: 05/22/2023    Patient: Karine Ovalle  YOB: 1965  MRN: [id number]     Time Calculation                   Chief Complaint: No chief complaint on file.    Visit #: 4    Subjective    Lymphedema Objective    Left Lower Extremity Circumferential Measurements 5/8  Waist: cm  Hip: cm  Scrotum: cm  Ground/Upper Thigh: cm  Mid Thigh: 56 cm  Knee: 44.6 cm  Upper Calf: 43.5 cm  Mid Calf: 32.7 cm  Ankle: 27.5 cm  Heel to Foot: 21.6 cm  Total: 225.9 cm     Right Lower Extremity Circumferential Measurements 5/8  Waist: cm  Hip: cm  Scrotum: cm  Ground/Upper Thigh: cm  Mid Thigh: 52.5 cm  Knee: 42.8 cm  Upper Calf: 39.3 cm  Mid Calf: 30.3 cm  Ankle: 26.1 cm  Heel to Foot: 20.3 cm  Total: 211.3 cm       Left Lower Extremity Circumferential Measurements EVAL 4/6  Waist: cm  Hip: cm  Scrotum: cm  Ground/Upper Thigh: cm  Mid Thigh: 62.7 cm  Knee: 53.2 cm  Upper Calf: 49.3 cm  Mid Calf: 36.7 cm  Ankle: 28.8 cm  Heel to Foot: 22.7 cm  Total: 253.4 cm     Right Lower Extremity Circumferential Measurements EVAL 4/6  Waist: cm  Hip: cm  Scrotum: cm  Ground/Upper Thigh: cm  Mid Thigh: 62.5 cm  Knee: 51.2 cm  Upper Calf: 41.3 (at base of scar below larger tissue) cm  Mid Calf: 32 cm  Ankle: 27.3 cm  Heel to Foot: 21.4 cm  Total: 235.7 cm  Exercises/Treatment  Time-based treatments/modalities:           LYMPHEDEMA ASSESSMENT AND PLAN

## 2023-05-26 ENCOUNTER — TELEPHONE (OUTPATIENT)
Dept: MEDICAL GROUP | Age: 58
End: 2023-05-26

## 2023-05-26 NOTE — TELEPHONE ENCOUNTER
"Patient stopped by the office asking for paperwork she says \"Deana is aware of\". She asked me to print out the blank document as well as his old paperwork. I told her she needs to wait for Deana to get back. She was not very happy, but said ok.    Can we look into what is happening with her paperwork please? Its for her HR.    Thank you  "

## 2023-05-31 ENCOUNTER — APPOINTMENT (OUTPATIENT)
Dept: PHYSICAL THERAPY | Facility: REHABILITATION | Age: 58
End: 2023-05-31
Attending: NURSE PRACTITIONER
Payer: COMMERCIAL

## 2023-06-05 ENCOUNTER — APPOINTMENT (OUTPATIENT)
Dept: PHYSICAL THERAPY | Facility: REHABILITATION | Age: 58
End: 2023-06-05
Attending: NURSE PRACTITIONER
Payer: COMMERCIAL

## 2023-06-07 ENCOUNTER — APPOINTMENT (OUTPATIENT)
Dept: RADIOLOGY | Facility: MEDICAL CENTER | Age: 58
End: 2023-06-07
Attending: EMERGENCY MEDICINE
Payer: COMMERCIAL

## 2023-06-07 ENCOUNTER — APPOINTMENT (OUTPATIENT)
Dept: SLEEP MEDICINE | Facility: MEDICAL CENTER | Age: 58
End: 2023-06-07
Attending: INTERNAL MEDICINE
Payer: COMMERCIAL

## 2023-06-07 ENCOUNTER — HOSPITAL ENCOUNTER (OUTPATIENT)
Facility: MEDICAL CENTER | Age: 58
End: 2023-06-08
Attending: EMERGENCY MEDICINE | Admitting: INTERNAL MEDICINE
Payer: COMMERCIAL

## 2023-06-07 ENCOUNTER — APPOINTMENT (OUTPATIENT)
Dept: SLEEP MEDICINE | Facility: MEDICAL CENTER | Age: 58
End: 2023-06-07
Payer: COMMERCIAL

## 2023-06-07 ENCOUNTER — APPOINTMENT (OUTPATIENT)
Dept: PHYSICAL THERAPY | Facility: REHABILITATION | Age: 58
End: 2023-06-07
Attending: NURSE PRACTITIONER
Payer: COMMERCIAL

## 2023-06-07 DIAGNOSIS — R09.02 HYPOXIA: ICD-10-CM

## 2023-06-07 DIAGNOSIS — J45.30 MILD PERSISTENT ASTHMA WITHOUT COMPLICATION: ICD-10-CM

## 2023-06-07 DIAGNOSIS — J45.51 SEVERE PERSISTENT ASTHMA WITH EXACERBATION: ICD-10-CM

## 2023-06-07 DIAGNOSIS — J45.21 MILD INTERMITTENT ASTHMA WITH ACUTE EXACERBATION: ICD-10-CM

## 2023-06-07 PROBLEM — J45.901 ACUTE ASTHMA EXACERBATION: Status: ACTIVE | Noted: 2023-06-07

## 2023-06-07 LAB
ALBUMIN SERPL BCP-MCNC: 3.3 G/DL (ref 3.2–4.9)
ALBUMIN/GLOB SERPL: 1.1 G/DL
ALP SERPL-CCNC: 146 U/L (ref 30–99)
ALT SERPL-CCNC: 26 U/L (ref 2–50)
ANION GAP SERPL CALC-SCNC: 10 MMOL/L (ref 7–16)
AST SERPL-CCNC: 31 U/L (ref 12–45)
BASOPHILS # BLD AUTO: 0.6 % (ref 0–1.8)
BASOPHILS # BLD: 0.04 K/UL (ref 0–0.12)
BILIRUB SERPL-MCNC: 0.3 MG/DL (ref 0.1–1.5)
BUN SERPL-MCNC: 11 MG/DL (ref 8–22)
CALCIUM ALBUM COR SERPL-MCNC: 9.4 MG/DL (ref 8.5–10.5)
CALCIUM SERPL-MCNC: 8.8 MG/DL (ref 8.5–10.5)
CHLORIDE SERPL-SCNC: 107 MMOL/L (ref 96–112)
CO2 SERPL-SCNC: 26 MMOL/L (ref 20–33)
CREAT SERPL-MCNC: 0.58 MG/DL (ref 0.5–1.4)
D DIMER PPP IA.FEU-MCNC: 0.68 UG/ML (FEU) (ref 0–0.5)
EKG IMPRESSION: NORMAL
EOSINOPHIL # BLD AUTO: 0.2 K/UL (ref 0–0.51)
EOSINOPHIL NFR BLD: 3.2 % (ref 0–6.9)
ERYTHROCYTE [DISTWIDTH] IN BLOOD BY AUTOMATED COUNT: 45.9 FL (ref 35.9–50)
GFR SERPLBLD CREATININE-BSD FMLA CKD-EPI: 105 ML/MIN/1.73 M 2
GLOBULIN SER CALC-MCNC: 3.1 G/DL (ref 1.9–3.5)
GLUCOSE SERPL-MCNC: 104 MG/DL (ref 65–99)
HCT VFR BLD AUTO: 35.4 % (ref 37–47)
HGB BLD-MCNC: 12.4 G/DL (ref 12–16)
IMM GRANULOCYTES # BLD AUTO: 0.02 K/UL (ref 0–0.11)
IMM GRANULOCYTES NFR BLD AUTO: 0.3 % (ref 0–0.9)
LYMPHOCYTES # BLD AUTO: 2.02 K/UL (ref 1–4.8)
LYMPHOCYTES NFR BLD: 31.9 % (ref 22–41)
MCH RBC QN AUTO: 28.8 PG (ref 27–33)
MCHC RBC AUTO-ENTMCNC: 35 G/DL (ref 32.2–35.5)
MCV RBC AUTO: 82.1 FL (ref 81.4–97.8)
MONOCYTES # BLD AUTO: 0.5 K/UL (ref 0–0.85)
MONOCYTES NFR BLD AUTO: 7.9 % (ref 0–13.4)
NEUTROPHILS # BLD AUTO: 3.56 K/UL (ref 1.82–7.42)
NEUTROPHILS NFR BLD: 56.1 % (ref 44–72)
NRBC # BLD AUTO: 0 K/UL
NRBC BLD-RTO: 0 /100 WBC (ref 0–0.2)
PLATELET # BLD AUTO: 271 K/UL (ref 164–446)
PMV BLD AUTO: 9.6 FL (ref 9–12.9)
POTASSIUM SERPL-SCNC: 4 MMOL/L (ref 3.6–5.5)
PROT SERPL-MCNC: 6.4 G/DL (ref 6–8.2)
RBC # BLD AUTO: 4.31 M/UL (ref 4.2–5.4)
SODIUM SERPL-SCNC: 143 MMOL/L (ref 135–145)
TROPONIN T SERPL-MCNC: 19 NG/L (ref 6–19)
WBC # BLD AUTO: 6.3 K/UL (ref 4.8–10.8)

## 2023-06-07 PROCEDURE — 700117 HCHG RX CONTRAST REV CODE 255: Performed by: EMERGENCY MEDICINE

## 2023-06-07 PROCEDURE — 700102 HCHG RX REV CODE 250 W/ 637 OVERRIDE(OP): Performed by: NURSE PRACTITIONER

## 2023-06-07 PROCEDURE — 96374 THER/PROPH/DIAG INJ IV PUSH: CPT

## 2023-06-07 PROCEDURE — G0378 HOSPITAL OBSERVATION PER HR: HCPCS

## 2023-06-07 PROCEDURE — 85025 COMPLETE CBC W/AUTO DIFF WBC: CPT

## 2023-06-07 PROCEDURE — 700111 HCHG RX REV CODE 636 W/ 250 OVERRIDE (IP): Performed by: EMERGENCY MEDICINE

## 2023-06-07 PROCEDURE — 700101 HCHG RX REV CODE 250: Performed by: INTERNAL MEDICINE

## 2023-06-07 PROCEDURE — 85379 FIBRIN DEGRADATION QUANT: CPT

## 2023-06-07 PROCEDURE — 36415 COLL VENOUS BLD VENIPUNCTURE: CPT

## 2023-06-07 PROCEDURE — 80053 COMPREHEN METABOLIC PANEL: CPT

## 2023-06-07 PROCEDURE — 71275 CT ANGIOGRAPHY CHEST: CPT

## 2023-06-07 PROCEDURE — 94640 AIRWAY INHALATION TREATMENT: CPT

## 2023-06-07 PROCEDURE — 700101 HCHG RX REV CODE 250: Performed by: EMERGENCY MEDICINE

## 2023-06-07 PROCEDURE — 93005 ELECTROCARDIOGRAM TRACING: CPT | Performed by: EMERGENCY MEDICINE

## 2023-06-07 PROCEDURE — 71045 X-RAY EXAM CHEST 1 VIEW: CPT

## 2023-06-07 PROCEDURE — 99223 1ST HOSP IP/OBS HIGH 75: CPT | Mod: FS | Performed by: INTERNAL MEDICINE

## 2023-06-07 PROCEDURE — 84484 ASSAY OF TROPONIN QUANT: CPT

## 2023-06-07 PROCEDURE — A9270 NON-COVERED ITEM OR SERVICE: HCPCS | Performed by: NURSE PRACTITIONER

## 2023-06-07 PROCEDURE — 99285 EMERGENCY DEPT VISIT HI MDM: CPT

## 2023-06-07 RX ORDER — ALBUTEROL SULFATE 90 UG/1
2 AEROSOL, METERED RESPIRATORY (INHALATION) EVERY 4 HOURS PRN
Status: DISCONTINUED | OUTPATIENT
Start: 2023-06-07 | End: 2023-06-08 | Stop reason: HOSPADM

## 2023-06-07 RX ORDER — GABAPENTIN 400 MG/1
800 CAPSULE ORAL 3 TIMES DAILY
Status: DISCONTINUED | OUTPATIENT
Start: 2023-06-07 | End: 2023-06-08 | Stop reason: HOSPADM

## 2023-06-07 RX ORDER — VITAMIN B COMPLEX
1000 TABLET ORAL DAILY
Status: DISCONTINUED | OUTPATIENT
Start: 2023-06-08 | End: 2023-06-08 | Stop reason: HOSPADM

## 2023-06-07 RX ORDER — ZINC SULFATE 50(220)MG
220 CAPSULE ORAL DAILY
Status: DISCONTINUED | OUTPATIENT
Start: 2023-06-08 | End: 2023-06-08 | Stop reason: HOSPADM

## 2023-06-07 RX ORDER — METHYLPREDNISOLONE SODIUM SUCCINATE 125 MG/2ML
125 INJECTION, POWDER, LYOPHILIZED, FOR SOLUTION INTRAMUSCULAR; INTRAVENOUS ONCE
Status: COMPLETED | OUTPATIENT
Start: 2023-06-07 | End: 2023-06-07

## 2023-06-07 RX ORDER — METHOCARBAMOL 500 MG/1
1000 TABLET, FILM COATED ORAL 4 TIMES DAILY PRN
Status: DISCONTINUED | OUTPATIENT
Start: 2023-06-07 | End: 2023-06-08 | Stop reason: HOSPADM

## 2023-06-07 RX ORDER — FLUTICASONE PROPIONATE 44 UG/1
2 AEROSOL, METERED RESPIRATORY (INHALATION)
Status: DISCONTINUED | OUTPATIENT
Start: 2023-06-07 | End: 2023-06-08 | Stop reason: HOSPADM

## 2023-06-07 RX ORDER — IPRATROPIUM BROMIDE AND ALBUTEROL SULFATE 2.5; .5 MG/3ML; MG/3ML
3 SOLUTION RESPIRATORY (INHALATION)
Status: DISCONTINUED | OUTPATIENT
Start: 2023-06-07 | End: 2023-06-07

## 2023-06-07 RX ORDER — BISACODYL 10 MG
10 SUPPOSITORY, RECTAL RECTAL
Status: DISCONTINUED | OUTPATIENT
Start: 2023-06-07 | End: 2023-06-08 | Stop reason: HOSPADM

## 2023-06-07 RX ORDER — MORPHINE SULFATE 15 MG/1
15 TABLET, FILM COATED, EXTENDED RELEASE ORAL EVERY 12 HOURS
Status: DISCONTINUED | OUTPATIENT
Start: 2023-06-07 | End: 2023-06-08 | Stop reason: HOSPADM

## 2023-06-07 RX ORDER — OXYCODONE AND ACETAMINOPHEN 10; 325 MG/1; MG/1
1 TABLET ORAL EVERY 6 HOURS PRN
Status: DISCONTINUED | OUTPATIENT
Start: 2023-06-07 | End: 2023-06-08 | Stop reason: HOSPADM

## 2023-06-07 RX ORDER — HEPARIN SODIUM 5000 [USP'U]/ML
5000 INJECTION, SOLUTION INTRAVENOUS; SUBCUTANEOUS EVERY 8 HOURS
Status: DISCONTINUED | OUTPATIENT
Start: 2023-06-07 | End: 2023-06-08 | Stop reason: HOSPADM

## 2023-06-07 RX ORDER — FUROSEMIDE 40 MG/1
40 TABLET ORAL
Status: DISCONTINUED | OUTPATIENT
Start: 2023-06-08 | End: 2023-06-08 | Stop reason: HOSPADM

## 2023-06-07 RX ORDER — IPRATROPIUM BROMIDE AND ALBUTEROL SULFATE 2.5; .5 MG/3ML; MG/3ML
3 SOLUTION RESPIRATORY (INHALATION) ONCE
Status: COMPLETED | OUTPATIENT
Start: 2023-06-07 | End: 2023-06-07

## 2023-06-07 RX ORDER — METHYLPREDNISOLONE SODIUM SUCCINATE 40 MG/ML
40 INJECTION, POWDER, LYOPHILIZED, FOR SOLUTION INTRAMUSCULAR; INTRAVENOUS EVERY 12 HOURS
Status: DISCONTINUED | OUTPATIENT
Start: 2023-06-08 | End: 2023-06-08 | Stop reason: HOSPADM

## 2023-06-07 RX ORDER — LANOLIN ALCOHOL/MO/W.PET/CERES
400 CREAM (GRAM) TOPICAL 2 TIMES DAILY
Status: DISCONTINUED | OUTPATIENT
Start: 2023-06-07 | End: 2023-06-08 | Stop reason: HOSPADM

## 2023-06-07 RX ORDER — POLYETHYLENE GLYCOL 3350 17 G/17G
1 POWDER, FOR SOLUTION ORAL
Status: DISCONTINUED | OUTPATIENT
Start: 2023-06-07 | End: 2023-06-08 | Stop reason: HOSPADM

## 2023-06-07 RX ORDER — FERROUS SULFATE 325(65) MG
325 TABLET ORAL
Status: DISCONTINUED | OUTPATIENT
Start: 2023-06-08 | End: 2023-06-08 | Stop reason: HOSPADM

## 2023-06-07 RX ORDER — IPRATROPIUM BROMIDE AND ALBUTEROL SULFATE 2.5; .5 MG/3ML; MG/3ML
3 SOLUTION RESPIRATORY (INHALATION)
Status: DISCONTINUED | OUTPATIENT
Start: 2023-06-07 | End: 2023-06-08 | Stop reason: HOSPADM

## 2023-06-07 RX ORDER — ASCORBIC ACID 500 MG
500 TABLET ORAL 2 TIMES DAILY
Status: DISCONTINUED | OUTPATIENT
Start: 2023-06-07 | End: 2023-06-08 | Stop reason: HOSPADM

## 2023-06-07 RX ADMIN — METHYLPREDNISOLONE SODIUM SUCCINATE 125 MG: 125 INJECTION, POWDER, FOR SOLUTION INTRAMUSCULAR; INTRAVENOUS at 16:39

## 2023-06-07 RX ADMIN — GABAPENTIN 800 MG: 400 CAPSULE ORAL at 20:51

## 2023-06-07 RX ADMIN — IPRATROPIUM BROMIDE AND ALBUTEROL SULFATE 3 ML: 2.5; .5 SOLUTION RESPIRATORY (INHALATION) at 23:46

## 2023-06-07 RX ADMIN — IPRATROPIUM BROMIDE AND ALBUTEROL SULFATE 3 ML: 2.5; .5 SOLUTION RESPIRATORY (INHALATION) at 11:50

## 2023-06-07 RX ADMIN — Medication 400 MG: at 20:51

## 2023-06-07 RX ADMIN — MORPHINE SULFATE 15 MG: 15 TABLET, FILM COATED, EXTENDED RELEASE ORAL at 20:54

## 2023-06-07 RX ADMIN — FLUTICASONE PROPIONATE 88 MCG: 44 AEROSOL, METERED RESPIRATORY (INHALATION) at 21:50

## 2023-06-07 RX ADMIN — OXYCODONE HYDROCHLORIDE AND ACETAMINOPHEN 500 MG: 500 TABLET ORAL at 20:51

## 2023-06-07 RX ADMIN — IOHEXOL 55 ML: 350 INJECTION, SOLUTION INTRAVENOUS at 16:06

## 2023-06-07 ASSESSMENT — LIFESTYLE VARIABLES
TOTAL SCORE: 0
ON A TYPICAL DAY WHEN YOU DRINK ALCOHOL HOW MANY DRINKS DO YOU HAVE: 1
HOW MANY TIMES IN THE PAST YEAR HAVE YOU HAD 5 OR MORE DRINKS IN A DAY: 0
EVER FELT BAD OR GUILTY ABOUT YOUR DRINKING: NO
AVERAGE NUMBER OF DAYS PER WEEK YOU HAVE A DRINK CONTAINING ALCOHOL: 1
TOTAL SCORE: 0
DOES PATIENT WANT TO STOP DRINKING: NO
CONSUMPTION TOTAL: NEGATIVE
EVER HAD A DRINK FIRST THING IN THE MORNING TO STEADY YOUR NERVES TO GET RID OF A HANGOVER: NO
HAVE YOU EVER FELT YOU SHOULD CUT DOWN ON YOUR DRINKING: NO
HAVE PEOPLE ANNOYED YOU BY CRITICIZING YOUR DRINKING: NO
TOTAL SCORE: 0
ALCOHOL_USE: YES

## 2023-06-07 ASSESSMENT — ENCOUNTER SYMPTOMS
ABDOMINAL PAIN: 0
NERVOUS/ANXIOUS: 1
FLANK PAIN: 0
BLOOD IN STOOL: 0
SHORTNESS OF BREATH: 1
HEADACHES: 0
SPUTUM PRODUCTION: 0
HEARTBURN: 0
PND: 0
DEPRESSION: 0
SEIZURES: 0
ORTHOPNEA: 0
INSOMNIA: 0
SINUS PAIN: 0
WEIGHT LOSS: 0
NAUSEA: 1
TINGLING: 0
NECK PAIN: 1
EYES NEGATIVE: 1
SORE THROAT: 0
VOMITING: 0
WHEEZING: 1
SENSORY CHANGE: 1
DIAPHORESIS: 0
MYALGIAS: 1
BACK PAIN: 1
FEVER: 0
DIARRHEA: 0
PALPITATIONS: 0
CHILLS: 0
CONSTIPATION: 0
COUGH: 0
HEMOPTYSIS: 0
DIZZINESS: 0

## 2023-06-07 ASSESSMENT — HEART SCORE
AGE: 45-64
TROPONIN: LESS THAN OR EQUAL TO NORMAL LIMIT
RISK FACTORS: 1-2 RISK FACTORS
HEART SCORE: 2
ECG: NORMAL
HISTORY: SLIGHTLY SUSPICIOUS
AGE: 45-64
RISK FACTORS: 1-2 RISK FACTORS
HEART SCORE: 2
HISTORY: SLIGHTLY SUSPICIOUS
TROPONIN: LESS THAN OR EQUAL TO NORMAL LIMIT
ECG: NORMAL

## 2023-06-07 ASSESSMENT — FIBROSIS 4 INDEX
FIB4 SCORE: 1.3
FIB4 SCORE: 1

## 2023-06-07 ASSESSMENT — PAIN DESCRIPTION - PAIN TYPE: TYPE: CHRONIC PAIN

## 2023-06-07 NOTE — ED PROVIDER NOTES
"ED Provider Note    CHIEF COMPLAINT  Chief Complaint   Patient presents with    Shortness of Breath     BIBA for difficulty breathing. Pt states she is \"allergic to tobacco and my neighbor was smoking. I was going to my pulmonary appt and couldn't make it. I went to the wound care clinic and told them to call [facility]\". Pt 99% on RA for [facility]., 100% on RA here. Denies chest pain.        EXTERNAL RECORDS REVIEWED  Outpatient Notes the patient's last office visit was with Dr. Lozoya 5/12/2023 pulmonary.  Patient has a history of mild persistent asthma and bronchitis she is taking Flovent and albuterol her last echocardiogram shows an ejection fraction of 65% with normal wall motion and normal diastolic function    HPI/ROS  LIMITATION TO HISTORY   Select: : None  OUTSIDE HISTORIAN(S):  none    Karine Ovalle is a 58 y.o. female who presents by ambulance complaining of asthma exacerbation due to secondhand smoke exposure from her neighbor.  The patient states that she was supposed to go to her pulmonary appointment today but could not make it because she was having so much trouble breathing.  She states that she used to use a nebulizer machine and would like a refill of this.  She states that she had bronchitis in January and was on antibiotics and steroids but has not been on either of these since.  She states that she feels short of breath but she has no productive cough fevers or chills.  She does have chronic lower leg swelling.  She denies any chest pain.  The patient denies any exposure to COVID or flu.  She states that her neighbor was smoking outside and she was exposed to the smoke on her way to her appointment.    PAST MEDICAL HISTORY   has a past medical history of Administrative encounter- RTC ACCESS/ADA PARATRANSIT ELIGIBILITY (06/04/2019), Anemia, Anemia, chronic disease (06/02/2022), Arthritis, At risk for falls, Back pain, Bronchitis, Cellulitis of right lower extremity (12/31/2021), Chickenpox, " Chronic back pain, Cough, Dental disorder, Edema (12/13/2018), Frequent headaches, History of gastric bypass (04/25/2012), Hoarseness, persistent, Hypokalemia (06/07/2018), Hyponatremia (05/18/2012), Left hip pain (09/18/2018), Leg edema (08/09/2021), Leukocytosis (04/03/2022), Microcytic anemia- postop THR 2/2019 (12/13/2018), Mild intermittent asthma with acute exacerbation (04/25/2012), Morning headache, Multiple closed fractures of facial bone (HCC) (06/01/2022), Near syncope (06/01/2022), Obesity, Open wound of right lower extremity- needs wound vac (04/04/2022), Pain (12/04/2018), Pain (02/04/2019), Primary osteoarthritis of both hips- dr nowak; left THR done feb 6, 2019; right THR 3/2018 (06/07/2018), Rhinitis, S/P hip replacement, bilateral (02/13/2019), Sepsis ([facility]) (04/03/2022), Serum gamma globulin increased (06/08/2022), Shortness of breath, Swelling of lower extremity, Tonsillitis, Toothache, Urinary incontinence, and Wheezing.    SURGICAL HISTORY   has a past surgical history that includes gastric bypass laparoscopic (2002); abdominal exploration; plastic surgery (2004); debridement (05/17/2012); appendectomy laparoscopic (03/21/2013); knee arthroplasty total (Right, 10/20/2015); mammoplasty augmentation (Bilateral, 2004); hip arthroplasty total (Right, 03/20/2018); cervical disk and fusion anterior (12/05/2018); corpectomy (12/05/2018); cervical fusion posterior (12/07/2018); cervical laminectomy posterior (12/07/2018); hip arthroplasty total (Left, 02/13/2019); other; other; Sleeve,Jack Vaso Thigh; primary c section; hip replacement, total; tonsillectomy; appendectomy; and arthroscopy, knee.    FAMILY HISTORY  Family History   Problem Relation Age of Onset    Diabetes Mother     Heart Disease Mother        SOCIAL HISTORY  Social History     Tobacco Use    Smoking status: Never     Passive exposure: Past    Smokeless tobacco: Never    Tobacco comments:     Allergies  to cigarette  smoke, creates  "shortness breathe   Vaping Use    Vaping Use: Never used   Substance and Sexual Activity    Alcohol use: Yes     Alcohol/week: 1.2 oz     Types: 2 Glasses of wine per week     Comment: 3-4 per week; pt stops alcohol use 1-week ago    Drug use: No     Frequency: 4.0 times per week    Sexual activity: Never       CURRENT MEDICATIONS  Home Medications    **Home medications have not yet been reviewed for this encounter**         ALLERGIES  Allergies   Allergen Reactions    Tobacco [Nicotiana Tabacum] Shortness of Breath     Cigarette smoke causes SOB, rispatory issues    Other Environmental      Cigarette smoke       PHYSICAL EXAM  VITAL SIGNS: BP (!) 142/85   Pulse 84   Temp 36.4 °C (97.6 °F) (Temporal)   Resp 20   Ht 1.6 m (5' 3\")   Wt 91.6 kg (202 lb)   LMP  (LMP Unknown)   SpO2 99%   BMI 35.78 kg/m²      Constitutional: Well developed, Well nourished, No acute distress, Non-toxic appearance.   HEENT: Normocephalic, Atraumatic,  external ears normal, pharynx pink,  Mucous  Membranes moist, No rhinorrhea or mucosal edema  Eyes: PERRL, EOMI, Conjunctiva normal, No discharge.   Neck: Normal range of motion, No tenderness, Supple, No stridor.   Lymphatic: No lymphadenopathy    Cardiovascular: Regular Rate and Rhythm, No murmurs,  rubs, or gallops.   Thorax & Lungs: Lungs clear to auscultation bilaterally, No respiratory distress, No wheezes, rhales or rhonchi, No chest wall tenderness.   Abdomen: Bowel sounds normal, Soft, non tender, non distended,  No pulsatile masses., no rebound guarding or peritoneal signs.   Skin: Warm, Dry, No erythema, No rash,   Back:  No CVA tenderness,  No spinal tenderness, bony crepitance step offs or instability.   Extremities: Equal, intact distal pulses, No cyanosis, clubbing or edema,  No tenderness.   Musculoskeletal: Good range of motion in all major joints. No tenderness to palpation or major deformities noted.   Neurologic: Alert & oriented  No focal deficits noted. "   Psychiatric: Affect normal, Judgment normal, Mood normal.       DIAGNOSTIC STUDIES / PROCEDURES  EKG  I have independently interpreted this EKG  See below    LABS  Results for orders placed or performed during the hospital encounter of 06/07/23   CBC with Differential   Result Value Ref Range    WBC 6.3 4.8 - 10.8 K/uL    RBC 4.31 4.20 - 5.40 M/uL    Hemoglobin 12.4 12.0 - 16.0 g/dL    Hematocrit 35.4 (L) 37.0 - 47.0 %    MCV 82.1 81.4 - 97.8 fL    MCH 28.8 27.0 - 33.0 pg    MCHC 35.0 32.2 - 35.5 g/dL    RDW 45.9 35.9 - 50.0 fL    Platelet Count 271 164 - 446 K/uL    MPV 9.6 9.0 - 12.9 fL    Neutrophils-Polys 56.10 44.00 - 72.00 %    Lymphocytes 31.90 22.00 - 41.00 %    Monocytes 7.90 0.00 - 13.40 %    Eosinophils 3.20 0.00 - 6.90 %    Basophils 0.60 0.00 - 1.80 %    Immature Granulocytes 0.30 0.00 - 0.90 %    Nucleated RBC 0.00 0.00 - 0.20 /100 WBC    Neutrophils (Absolute) 3.56 1.82 - 7.42 K/uL    Lymphs (Absolute) 2.02 1.00 - 4.80 K/uL    Monos (Absolute) 0.50 0.00 - 0.85 K/uL    Eos (Absolute) 0.20 0.00 - 0.51 K/uL    Baso (Absolute) 0.04 0.00 - 0.12 K/uL    Immature Granulocytes (abs) 0.02 0.00 - 0.11 K/uL    NRBC (Absolute) 0.00 K/uL   Complete Metabolic Panel (CMP)   Result Value Ref Range    Sodium 143 135 - 145 mmol/L    Potassium 4.0 3.6 - 5.5 mmol/L    Chloride 107 96 - 112 mmol/L    Co2 26 20 - 33 mmol/L    Anion Gap 10.0 7.0 - 16.0    Glucose 104 (H) 65 - 99 mg/dL    Bun 11 8 - 22 mg/dL    Creatinine 0.58 0.50 - 1.40 mg/dL    Calcium 8.8 8.5 - 10.5 mg/dL    AST(SGOT) 31 12 - 45 U/L    ALT(SGPT) 26 2 - 50 U/L    Alkaline Phosphatase 146 (H) 30 - 99 U/L    Total Bilirubin 0.3 0.1 - 1.5 mg/dL    Albumin 3.3 3.2 - 4.9 g/dL    Total Protein 6.4 6.0 - 8.2 g/dL    Globulin 3.1 1.9 - 3.5 g/dL    A-G Ratio 1.1 g/dL   Troponin STAT   Result Value Ref Range    Troponin T 19 6 - 19 ng/L   D-Dimer   Result Value Ref Range    D-Dimer 0.68 (H) 0.00 - 0.50 ug/mL (FEU)   CORRECTED CALCIUM   Result Value Ref Range     Correct Calcium 9.4 8.5 - 10.5 mg/dL   ESTIMATED GFR   Result Value Ref Range    GFR (CKD-EPI) 105 >60 mL/min/1.73 m 2   EKG (NOW)   Result Value Ref Range    Report       [facility] Emergency Dept.    Test Date:  2023  Pt Name:    ANA LAWLER                  Department: ER  MRN:        [id number]                      Room:        21  Gender:     Female                       Technician: [id number]  :        1965                   Requested By:ER TRIAGE PROTOCOL  Order #:    [id number]                    Reading MD: CORINNE DUNHAM MD    Measurements  Intervals                                Axis  Rate:       89                           P:          48  ID:         [id number]                          QRS:        3  QRSD:       93                           T:          40  QT:         381  QTc:        464    Interpretive Statements  Sinus rhythm  Left atrial enlargement  Probable left ventricular hypertrophy  Artifact in lead(s) I,II,aVR,V1,V2  Compared to ECG 2018 11:06:57  Atrial abnormality now present  Electronically Signed On 2023 15:59:24 PDT by CORINNE DUNHAM MD       *Note: Due to a large number of results and/or encounters for the requested time period, some results have not been displayed. A complete set of results can be found in Results Review.         RADIOLOGY  I have independently interpreted the diagnostic imaging associated with this visit and am waiting the final reading from the radiologist.   My preliminary interpretation is as follows: cxr no infiltrate of pleural effusions  Radiologist interpretation:   CT-CTA CHEST PULMONARY ARTERY W/ RECONS   Final Result      1.  No CT evidence for pulmonary emboli.   2.  No pneumonia or pneumothorax.            DX-CHEST-PORTABLE (1 VIEW)   Final Result      No acute cardiopulmonary disease.           COURSE & MEDICAL DECISION MAKING    ED Observation Status? Yes; I am placing the patient in to an observation status due to a  diagnostic uncertainty as well as therapeutic intensity. Patient placed in observation status at 11:03 AM, 6/7/2023.     Observation plan is as follows: Duovent breathing treatment    Upon Reevaluation, the patient's condition has: not improved; and will be escalated to hospitalization.    Patient discharged from ED Observation status at 4:24 PM  (Time) 6/7/23 (Date).     INITIAL ASSESSMENT, COURSE AND PLAN  Care Narrative: Karine Ovalle is a 58 y.o. female who presents by ambulance complaining of asthma exacerbation due to secondhand smoke exposure from her neighbor.  The patient states that she was supposed to go to her pulmonary appointment today but could not make it because she was having so much trouble breathing.  She states that she used to use a nebulizer machine and would like a refill of this.  She states that she had bronchitis in January and was on antibiotics and steroids but has not been on either of these since.  She states that she feels short of breath but she has no productive cough fevers or chills.  She does have chronic lower leg swelling.  She denies any chest pain.  The patient denies any exposure to COVID or flu.  She states that her neighbor was smoking outside and she was exposed to the smoke on her way to her appointment.        ADDITIONAL PROBLEM LIST  Asthma  Obstructive sleep apnea  Hypertension  Lymphedema  Cervical neck surgery due to stenosis  Taking chronic narcotics  DISPOSITION AND DISCUSSIONS    Patient CBC is normal with a white count of 6.3 hemoglobin 12.4 and a normal differential.  Her comprehensive metabolic panel is a slightly elevated glucose of 104 but otherwise normal electrolytes normal kidney function and normal liver function test.  Her alkaline phosphatase is slight elevated 146.  Troponin is normal at 19 and her EKG does not show any ischemic changes giving her a heart score of  2  The patient's D-dimer is elevated at 0.68.  I have ordered a CTA of the  pulmonary arteries to make sure she does not have dyspnea from a pulmonary embolus.  She does feel improved after the breathing treatment.    Patient became hypoxic at 88% on room air and required 2 L nasal cannula.  She is still starting to feel short of breath again.  Her D-dimer was elevated and a CTA of the chest was ordered.  CTA of the chest does not show any evidence of pneumonia or pulmonary embolism.  I ordered IV Solu-Medrol for her for asthma exacerbation and she will be admitted to the CDU overnight for breathing treatments and steroids and further care.    I have discussed management of the patient with the following physicians and GREG's: Hospitalist Dr. Cadena who will admit the patient for breathing treatments and steroids    Discussion of management with other [facility] or appropriate source(s): None     Escalation of care considered, and ultimately not performed: none    Barriers to care at this time, including but not limited to:  none.     Decision tools and prescription drugs considered including, but not limited to: D-dimer 0.68 elevated . Heart score 2   The patient will be admitted in guarded condition.   FINAL DIAGNOSIS  1. Severe persistent asthma with exacerbation    2. Hypoxia           Electronically signed by: Aline Haskins M.D., 6/7/2023 11:00 AM

## 2023-06-07 NOTE — ED NOTES
Respiratory notified that pt is ordered a breathing tx. MD notified that RN unable to find IV access. Phlebotomy to draw labs as IV not needed per MD.

## 2023-06-07 NOTE — ED NOTES
Hourly rounding complete. Pt resting on stretcher. VSS on RA . Respirations even and non-labored. No s/sx of distress noted. Call bell within reach, side rails up. No complaints at this time.  Awaiting phlebotomy.

## 2023-06-07 NOTE — ED NOTES
Bedside report from Abimbola PHAM, assumed care done. On RA, No signs of distress. AAOx4. Call light within reach. Updated with the plan of care.

## 2023-06-07 NOTE — ED TRIAGE NOTES
"Chief Complaint   Patient presents with    Shortness of Breath     BIBA for difficulty breathing. Pt states she is \"allergic to tobacco and my neighbor was smoking. I was going to my pulmonary appt and couldn't make it. I went to the wound care clinic and told them to call [facility]\". Pt 99% on RA for [facility]., 100% on RA here. Denies chest pain.      BP (!) 142/85   Pulse 84   Temp 36.4 °C (97.6 °F) (Temporal)   Resp 20   Ht 1.6 m (5' 3\")   Wt 91.6 kg (202 lb)   LMP  (LMP Unknown)   SpO2 99%   BMI 35.78 kg/m²     "

## 2023-06-08 ENCOUNTER — PHARMACY VISIT (OUTPATIENT)
Dept: PHARMACY | Facility: MEDICAL CENTER | Age: 58
End: 2023-06-08
Payer: COMMERCIAL

## 2023-06-08 ENCOUNTER — TELEPHONE (OUTPATIENT)
Dept: MEDICAL GROUP | Age: 58
End: 2023-06-08
Payer: COMMERCIAL

## 2023-06-08 VITALS
BODY MASS INDEX: 36.48 KG/M2 | DIASTOLIC BLOOD PRESSURE: 63 MMHG | WEIGHT: 205.91 LBS | OXYGEN SATURATION: 97 % | SYSTOLIC BLOOD PRESSURE: 100 MMHG | TEMPERATURE: 97.3 F | RESPIRATION RATE: 18 BRPM | HEART RATE: 104 BPM | HEIGHT: 63 IN

## 2023-06-08 LAB
ERYTHROCYTE [DISTWIDTH] IN BLOOD BY AUTOMATED COUNT: 46.5 FL (ref 35.9–50)
HCT VFR BLD AUTO: 38.9 % (ref 37–47)
HGB BLD-MCNC: 13 G/DL (ref 12–16)
MCH RBC QN AUTO: 28 PG (ref 27–33)
MCHC RBC AUTO-ENTMCNC: 33.4 G/DL (ref 32.2–35.5)
MCV RBC AUTO: 83.8 FL (ref 81.4–97.8)
PLATELET # BLD AUTO: 277 K/UL (ref 164–446)
PMV BLD AUTO: 9.2 FL (ref 9–12.9)
RBC # BLD AUTO: 4.64 M/UL (ref 4.2–5.4)
WBC # BLD AUTO: 4.9 K/UL (ref 4.8–10.8)

## 2023-06-08 PROCEDURE — 94640 AIRWAY INHALATION TREATMENT: CPT

## 2023-06-08 PROCEDURE — RXMED WILLOW AMBULATORY MEDICATION CHARGE: Performed by: INTERNAL MEDICINE

## 2023-06-08 PROCEDURE — A9270 NON-COVERED ITEM OR SERVICE: HCPCS | Performed by: NURSE PRACTITIONER

## 2023-06-08 PROCEDURE — 700101 HCHG RX REV CODE 250: Performed by: INTERNAL MEDICINE

## 2023-06-08 PROCEDURE — 99239 HOSP IP/OBS DSCHRG MGMT >30: CPT | Performed by: INTERNAL MEDICINE

## 2023-06-08 PROCEDURE — 85027 COMPLETE CBC AUTOMATED: CPT

## 2023-06-08 PROCEDURE — 700102 HCHG RX REV CODE 250 W/ 637 OVERRIDE(OP): Performed by: NURSE PRACTITIONER

## 2023-06-08 PROCEDURE — 96376 TX/PRO/DX INJ SAME DRUG ADON: CPT

## 2023-06-08 PROCEDURE — 94760 N-INVAS EAR/PLS OXIMETRY 1: CPT

## 2023-06-08 PROCEDURE — 700111 HCHG RX REV CODE 636 W/ 250 OVERRIDE (IP): Performed by: NURSE PRACTITIONER

## 2023-06-08 PROCEDURE — G0378 HOSPITAL OBSERVATION PER HR: HCPCS

## 2023-06-08 RX ORDER — PREDNISONE 20 MG/1
40 TABLET ORAL DAILY
Qty: 10 TABLET | Refills: 0 | Status: SHIPPED | OUTPATIENT
Start: 2023-06-08 | End: 2023-06-13

## 2023-06-08 RX ORDER — IPRATROPIUM BROMIDE AND ALBUTEROL SULFATE 2.5; .5 MG/3ML; MG/3ML
3 SOLUTION RESPIRATORY (INHALATION) EVERY 4 HOURS PRN
Qty: 90 EACH | Refills: 0 | Status: SHIPPED | OUTPATIENT
Start: 2023-06-08

## 2023-06-08 RX ADMIN — OXYCODONE AND ACETAMINOPHEN 1 TABLET: 10; 325 TABLET ORAL at 08:51

## 2023-06-08 RX ADMIN — OXYCODONE HYDROCHLORIDE AND ACETAMINOPHEN 500 MG: 500 TABLET ORAL at 05:33

## 2023-06-08 RX ADMIN — Medication 1000 UNITS: at 05:33

## 2023-06-08 RX ADMIN — Medication 220 MG: at 05:52

## 2023-06-08 RX ADMIN — FERROUS SULFATE TAB 325 MG (65 MG ELEMENTAL FE) 325 MG: 325 (65 FE) TAB at 08:37

## 2023-06-08 RX ADMIN — FLUTICASONE PROPIONATE 88 MCG: 44 AEROSOL, METERED RESPIRATORY (INHALATION) at 08:53

## 2023-06-08 RX ADMIN — OXYCODONE AND ACETAMINOPHEN 1 TABLET: 10; 325 TABLET ORAL at 03:31

## 2023-06-08 RX ADMIN — GABAPENTIN 800 MG: 400 CAPSULE ORAL at 05:33

## 2023-06-08 RX ADMIN — FUROSEMIDE 40 MG: 40 TABLET ORAL at 05:33

## 2023-06-08 RX ADMIN — MORPHINE SULFATE 15 MG: 15 TABLET, FILM COATED, EXTENDED RELEASE ORAL at 05:34

## 2023-06-08 RX ADMIN — THERA TABS 1 TABLET: TAB at 05:34

## 2023-06-08 RX ADMIN — METHYLPREDNISOLONE SODIUM SUCCINATE 40 MG: 40 INJECTION, POWDER, FOR SOLUTION INTRAMUSCULAR; INTRAVENOUS at 05:34

## 2023-06-08 RX ADMIN — IPRATROPIUM BROMIDE AND ALBUTEROL SULFATE 3 ML: 2.5; .5 SOLUTION RESPIRATORY (INHALATION) at 09:16

## 2023-06-08 RX ADMIN — Medication 400 MG: at 05:33

## 2023-06-08 ASSESSMENT — PAIN DESCRIPTION - PAIN TYPE
TYPE: CHRONIC PAIN
TYPE: ACUTE PAIN
TYPE: CHRONIC PAIN

## 2023-06-08 ASSESSMENT — COPD QUESTIONNAIRES
DURING THE PAST 4 WEEKS HOW MUCH DID YOU FEEL SHORT OF BREATH: SOME OF THE TIME
COPD SCREENING SCORE: 3
DO YOU EVER COUGH UP ANY MUCUS OR PHLEGM?: NO/ONLY WITH OCCASIONAL COLDS OR INFECTIONS
HAVE YOU SMOKED AT LEAST 100 CIGARETTES IN YOUR ENTIRE LIFE: NO/DON'T KNOW

## 2023-06-08 NOTE — PROGRESS NOTES
DC instructions reviewed with pt. Medications provided to pt. PIV removed. Pt agreeable to DC plan & all questions answered.      Pt to call [facility] to follow up for nebulizer. [facility]

## 2023-06-08 NOTE — PROGRESS NOTES
4 Eyes Skin Assessment Completed by VERO Bonner and VERO Domínguez.    Head WDL  Ears WDL  Nose WDL  Mouth WDL  Neck WDL  Breast/Chest WDL  Shoulder Blades WDL  Spine WDL  (R) Arm/Elbow/Hand WDL  (L) Arm/Elbow/Hand WDL  Abdomen WDL  Groin WDL  Scrotum/Coccyx/Buttocks WDL  (R) Leg Edema  (L) Leg Scar, Swelling, and Edema  (R) Heel/Foot/Toe Edema  (L) Heel/Foot/Toe Edema          Devices In Places N/A      Interventions In Place Pillows    Possible Skin Injury No    Pictures Uploaded Into Epic N/A  Wound Consult Placed N/A  RN Wound Prevention Protocol Ordered No

## 2023-06-08 NOTE — ASSESSMENT & PLAN NOTE
Secondary to chronic pain.  History of degenerative disc disease, status post cervical spine surgery, cervical radiculopathy and myelopathy; chronic low back pain; osteoarthritis in her knee; lower extremity pain.  She is on oxycodone and morphine, as well as other analgesics for chronic back and joint pain.  She is apparently supposed to be scheduled for surgery once her PCP clears her for this, but will likely need continued opioid use due to multiple other chronic pain issues.  - Continue home meds, gabapentin, Robaxin, oxycodone, and morphine

## 2023-06-08 NOTE — FACE TO FACE
Face to Face Note  -  Durable Medical Equipment    Daljit Cadena M.D. - NPI: [id number]  I certify that this patient is under my care and that they had a durable medical equipment(DME)face to face encounter by myself that meets the physician DME face-to-face encounter requirements with this patient on:    Date of encounter:   Patient:                    MRN:                       YOB: 2023  Karine Fox Dobbs  [id number]  1965     The encounter with the patient was in whole, or in part, for the following medical condition, which is the primary reason for durable medical equipment:  Asthma    I certify that, based on my findings, the following durable medical equipment is medically necessary:    Nebulizer.    My Clinical findings support the need for the above equipment due to:  Wheezing (Chronic)

## 2023-06-08 NOTE — ASSESSMENT & PLAN NOTE
History of mild intermittent asthma, uses albuterol as needed.  She does have an Advair disc, but tells me she does not know how to use this and so has not been using it.  Given Solu-Medrol 125 mg in the ED.  She was found to be hypoxic at 88% at 1 point, and so was given oxygen which improved her saturations.  She is not requiring oxygen at this time. CXR and CT PE are negative for acute findings  -Continue Solu-Medrol 40 mg IV twice daily  -Every 6 hours DuoNeb treatments  -Albuterol as needed  - IS  -No need for antibiotics.

## 2023-06-08 NOTE — ASSESSMENT & PLAN NOTE
Chronic bilateral lower extremity lymphedema secondary to venous insufficiency.  She normally takes 40 mg of Lasix daily as needed for increased edemia.  Did not take Lasix at this morning, 6/7/2023.  She says her legs are somewhat worse than normal.  -Continue 40 mg IV Lasix in the morning for the next 3 days

## 2023-06-08 NOTE — DISCHARGE PLANNING
Agency/Facility Name: [facility]  Referral sent per Choice form @ 1007, and separately sent face-to-face and DME order by manual fax.     1003-  Agency/Facility Name: [facility]  Spoke To: Cierra   Outcome: As requested by RN JB, DPA informed Michellewally of referral sent, asked that they call Pt to coordinate DME delivery, as Pt is discharging today and has an appointment after DC. [facility] confirmed they can call Pt.

## 2023-06-08 NOTE — ASSESSMENT & PLAN NOTE
She has multiple chronic pain issues, including History of degenerative disc disease, status post cervical spine surgery, cervical radiculopathy and myelopathy; chronic low back pain; osteoarthritis in her knee; lower extremity pain.  She is on oxycodone and morphine, as well as other analgesics for chronic back and joint pain.   - Continue home medications  - As needed analgesics

## 2023-06-08 NOTE — TELEPHONE ENCOUNTER
PT message : Good morning,  On 6/7/23 , [facility] to [facility] to [facility]  due to asthmatic issues,due to Second Hand Smoking  Exposure and other strong smells coming from my apartment complex residence.  I am now being released from Hospital.  You may look at my hospitalization medical records.  This is one of my [facility] medical conditions.     Karine

## 2023-06-08 NOTE — PROGRESS NOTES
Pt arrived to unit via gurney. Pt transferred from rFrakes to bed x1 assist. Oriented pt to call light and room.

## 2023-06-08 NOTE — DISCHARGE PLANNING
Received request for Nebulizer. Spoke with patient at bedside and verified all information. Choice form filled out and signed by patient. Patient wishes to have DME company contact her for delivery. Note placed on choice form and message sent to Sebastian LAWSON to update.

## 2023-06-08 NOTE — PROGRESS NOTES
Received report from day shift RN. Assumed patient care. Patient resting comfortably in bed with dinner tray, NAD, A&O x4. Patient ambulated to commode. Pt states 10/10 pain in the right leg. 4 eye skin assessment complete. Med rec complete. Waiting for pharmacy to approve medications. POC discussed with patient.

## 2023-06-08 NOTE — CARE PLAN
The patient is Stable - Low risk of patient condition declining or worsening    Shift Goals  Clinical Goals: monitor respiratory status  Patient Goals: rest  Family Goals: n/a    Progress made toward(s) clinical / shift goals:      Problem: Knowledge Deficit - Standard  Goal: Patient and family/care givers will demonstrate understanding of plan of care, disease process/condition, diagnostic tests and medications  Outcome: Progressing     Problem: Pain - Standard  Goal: Alleviation of pain or a reduction in pain to the patient’s comfort goal  Outcome: Progressing       Patient is not progressing towards the following goals:

## 2023-06-08 NOTE — DISCHARGE INSTRUCTIONS
-She will complete 5 days course of prednisone.  -Continue home Flovent, and as needed albuterol MDI.  She is prescribed DuoNeb nebulization, along with a nebulizer machine which she will also use as needed.  -Follow-up with outpatient pulmonology.  - counseled to seek immediate medical attention, or return to the ED for recurrent or worsening symptoms.    Follow up with [facility] for nebulizer

## 2023-06-08 NOTE — H&P
"Hospital Medicine History & Physical Note    Date of Service  6/7/2023    Primary Care Physician  Micky Rubin M.D.      Code Status  Full Code    Chief Complaint  Chief Complaint   Patient presents with    Shortness of Breath     BIBA for difficulty breathing. Pt states she is \"allergic to tobacco and my neighbor was smoking. I was going to my pulmonary appt and couldn't make it. I went to the wound care clinic and told them to call [facility]\". Pt 99% on RA for [facility]., 100% on RA here. Denies chest pain.        History of Presenting Illness  Karine Ovalle is a 58 y.o. female with a past medical history of mild intermittent asthma, chronic lower extremity lymphedema, smoke allergy, chronic anemia, chronic pain syndrome, and anxiety who presented to the ED 6/7/2023 with complaints of shortness of breath. The patient says she has a significant allergy to tobacco smoke. The patient says that prior to leaving for her pulmonologist appointment this morning, she could smell the smoke cigarette smoke coming from her neighbors, she suddenly felt like she was having shortness of breath, which she says is caused by this cigarette smoke.  She takes albuterol as needed, but did not take this this morning. The patient was supposed to get a pulmonary function test this morning, and was told not to use her inhalers this morning before her appointment. As such, the patient did not take albuterol as she normally would have. She made it to her pulmonology appointment, but was so short of breath that she went into to the wound clinic on the first floor of the office building and asked them to call EMS. Unfortunately, she was unable to make her pulmonology appointment.  She denies recent illness, cold or flulike symptoms.  She normally takes Lasix 40 mg daily for her chronic lower extremity lymphedema, but did not take this today.    CBC obtained in the ED is unremarkable. CMP is also unremarkable, though she has a mildly " elevated alk phos.Troponin is negative. Chest x-ray is negative for acute findings. D-dimer is mildly elevated at 1.7, so follow-up CT PE was ordered, which is negative for PE or any other acute findings.    I discussed the plan of care with patient.     Review of Systems  Review of Systems   Constitutional:  Negative for chills, diaphoresis, fever, malaise/fatigue and weight loss.   HENT:  Negative for congestion, hearing loss, nosebleeds, sinus pain and sore throat.    Eyes: Negative.    Respiratory:  Positive for shortness of breath and wheezing. Negative for cough, hemoptysis and sputum production.    Cardiovascular:  Negative for chest pain, palpitations, orthopnea, leg swelling and PND.   Gastrointestinal:  Positive for nausea. Negative for abdominal pain, blood in stool, constipation, diarrhea, heartburn, melena and vomiting.   Genitourinary:  Negative for dysuria, flank pain, frequency and urgency.   Musculoskeletal:  Positive for back pain (chronic back pain), joint pain (arthritis), myalgias (chronic generalized pain) and neck pain (chronic neck pain).   Skin:  Negative for itching and rash.   Neurological:  Positive for sensory change (chronic right hand paresthesis due to neck disorder). Negative for dizziness, tingling, seizures and headaches.   Psychiatric/Behavioral:  Negative for depression. The patient is nervous/anxious. The patient does not have insomnia.        Past Medical History   has a past medical history of Administrative encounter- RTC ACCESS/ADA PARATRANSIT ELIGIBILITY (06/04/2019), Anemia, Anemia, chronic disease (06/02/2022), Arthritis, At risk for falls, Back pain, Bronchitis, Cellulitis of right lower extremity (12/31/2021), Chickenpox, Chronic back pain, Cough, Dental disorder, Edema (12/13/2018), Frequent headaches, History of gastric bypass (04/25/2012), Hoarseness, persistent, Hypokalemia (06/07/2018), Hyponatremia (05/18/2012), Left hip pain (09/18/2018), Leg edema (08/09/2021),  Leukocytosis (04/03/2022), Microcytic anemia- postop THR 2/2019 (12/13/2018), Mild intermittent asthma with acute exacerbation (04/25/2012), Morning headache, Multiple closed fractures of facial bone (HCC) (06/01/2022), Near syncope (06/01/2022), Obesity, Open wound of right lower extremity- needs wound vac (04/04/2022), Pain (12/04/2018), Pain (02/04/2019), Primary osteoarthritis of both hips- dr nowak; left THR done feb 6, 2019; right THR 3/2018 (06/07/2018), Rhinitis, S/P hip replacement, bilateral (02/13/2019), Sepsis ([facility]) (04/03/2022), Serum gamma globulin increased (06/08/2022), Shortness of breath, Swelling of lower extremity, Tonsillitis, Toothache, Urinary incontinence, and Wheezing.    Surgical History   has a past surgical history that includes gastric bypass laparoscopic (2002); abdominal exploration; plastic surgery (2004); debridement (05/17/2012); appendectomy laparoscopic (03/21/2013); knee arthroplasty total (Right, 10/20/2015); mammoplasty augmentation (Bilateral, 2004); hip arthroplasty total (Right, 03/20/2018); cervical disk and fusion anterior (12/05/2018); corpectomy (12/05/2018); cervical fusion posterior (12/07/2018); cervical laminectomy posterior (12/07/2018); hip arthroplasty total (Left, 02/13/2019); other; other; Sleeve,Jack Vaso Thigh; primary c section; hip replacement, total; tonsillectomy; appendectomy; and arthroscopy, knee.     Family History  family history includes Diabetes in her mother; Heart Disease in her mother.   Family history reviewed with patient. There is no family history that is pertinent to the chief complaint.     Social History   reports that she has never smoked. She has been exposed to tobacco smoke. She has never used smokeless tobacco. She reports current alcohol use of about 1.2 oz of alcohol per week. She reports that she does not use drugs.  Works as a  for the state    Allergies  Allergies   Allergen Reactions    Tobacco [Nicotiana Tabacum]  Shortness of Breath     Cigarette smoke causes SOB, rispatory issues    Other Environmental      Cigarette smoke   Rarely drinks alcohol    Medications  Prior to Admission Medications   Prescriptions Last Dose Informant Patient Reported? Taking?   Biotin 5000 MCG Cap   Yes No   Sig: Take 1 Capsule by mouth every day. Indications: dietary supplement   CALCIUM CARBONATE-VITAMIN D PO  Patient Yes No   Sig: Take 1 Tablet by mouth every day. Indications: Low Amount of Calcium in the Blood   Multiple Vitamin (MULTIVITAMIN ADULT) Tab   Yes No   Sig: Take 1 Tablet by mouth every day. Indications: Nutritional Support   Naloxone (NARCAN) 4 MG/0.1ML Liquid   Yes No   Sig: naloxone 4 mg/actuation nasal spray   CALL 911. SPR CONTENTS OF ONE SPRAYER (0.1ML) INTO ONE NOSTRIL. REPEAT IN 2-3 MIN IF SYMPTOMS OF OPIOID EMERGENCY PERSIST, ALTERNATE NOSTRILS   albuterol 108 (90 Base) MCG/ACT Aero Soln inhalation aerosol   No No   Sig: Inhale 2 Puffs every 6 hours as needed for Shortness of Breath.   albuterol 108 (90 Base) MCG/ACT Aero Soln inhalation aerosol   No No   Sig: Inhale 2 Puffs every four hours as needed for Shortness of Breath.   ascorbic acid (VITAMIN C) 500 MG tablet   No No   Sig: Take 1 Tablet by mouth 2 times a day.   cetirizine (ZYRTEC ALLERGY) 10 MG Tab   No No   Sig: Take 1 Tablet by mouth every day.   ferrous sulfate 325 (65 Fe) MG tablet  Patient No No   Sig: Take 1 Tab by mouth every morning with breakfast.   fluticasone (FLOVENT HFA) 44 MCG/ACT Aerosol   No No   Sig: Inhale 2 Puffs 2 times a day as needed (tobacco smoke). Everyday maintenance steroid inhaler   fluticasone-salmeterol (ADVAIR) 250-50 MCG/ACT AEROSOL POWDER, BREATH ACTIVATED   No No   Sig: Inhale 1 Puff every 12 hours.   furosemide (LASIX) 40 MG Tab   No No   Sig: Take 1 Tablet by mouth 1 time a day as needed (leg edema). Patient reports only takes if leg edema increases. She doesn't use daily as prescribed.   Indications: Edema   gabapentin  (NEURONTIN) 800 MG tablet   No No   Sig: Take 1 Tablet by mouth 3 times a day.   magnesium oxide 400 (240 Mg) MG Tab   No No   Sig: Take 1 Tablet by mouth 2 times a day.   methocarbamol (ROBAXIN) 500 MG Tab   No No   Sig: Take 2 Tablets by mouth 4 times a day as needed (back pain and spasms). Indications: Musculoskeletal Pain   morphine ER (MS CONTIN) 15 MG Tab CR tablet   Yes No   Sig: Take 15 mg by mouth every 12 hours. Indications: Pain   morphine ER (MS CONTIN) 15 MG Tab CR tablet   Yes No   Sig: morphine ER 15 mg tablet,extended release   Take 1 tablet every 12 hours by oral route as directed for 30 days.   Do not drive, drink etoh while taking.   morphine ER (MS CONTIN) 30 MG Tab CR tablet   Yes No   Sig: morphine ER 30 mg tablet,extended release   TAKE 1 TABLET BY MOUTH EVERY 12 HOURS FOR 3 DAYS   naproxen (NAPROSYN) 500 MG Tab   Yes No   Sig: naproxen 500 mg tablet   TAKE 1 TABLET BY MOUTH TWICE DAILY X 10 DAYS   oxyCODONE-acetaminophen (PERCOCET-10)  MG Tab  Patient Yes No   Sig: Take 1 Tablet by mouth every 6 hours as needed for Moderate Pain or Severe Pain. Indications: breakthrough pain betweeen morphine administration   phentermine 37.5 MG capsule   No No   Sig: Take 1 Capsule by mouth every morning for 90 days.   potassium Chloride ER (K-TAB) 20 MEQ Tab CR tablet   Yes No   Sig: Take 20 mEq by mouth 1 time a day as needed (Per patient, she takes with furosemide. ). Indications: takes with furosemide   vitamin D3 (CHOLECALCIFEROL) 1000 Unit (25 mcg) Tab   Yes No   Sig: Take 1,000 Units by mouth every day. Indications: Vitamin D Deficiency, dietary supplement   zinc sulfate (ZINCATE) 220 (50 Zn) MG Cap   Yes No   Sig: Take 220 mg by mouth every day. Indications: Impaired Wound Healing, dietary supplement       Facility-Administered Medications: None       Physical Exam  Temp:  [36.4 °C (97.6 °F)-36.7 °C (98 °F)] 36.7 °C (98 °F)  Pulse:  [] 86  Resp:  [13-24] 18  BP: (117-154)/(68-91)  154/83  SpO2:  [88 %-99 %] 90 %  Blood Pressure: (!) 153/82   Temperature: 36.6 °C (97.8 °F)   Pulse: (!) 101   Respiration: (!) 22   Pulse Oximetry: 96 %       Physical Exam  Constitutional:       General: She is not in acute distress.     Appearance: Normal appearance. She is not ill-appearing.   HENT:      Head: Normocephalic and atraumatic.      Nose: No congestion or rhinorrhea.      Mouth/Throat:      Mouth: Mucous membranes are dry.      Pharynx: Oropharynx is clear.   Eyes:      Extraocular Movements: Extraocular movements intact.      Pupils: Pupils are equal, round, and reactive to light.   Cardiovascular:      Rate and Rhythm: Normal rate and regular rhythm.      Pulses: Normal pulses.      Heart sounds: Normal heart sounds. No murmur heard.     No friction rub. No gallop.   Pulmonary:      Effort: Pulmonary effort is normal. No respiratory distress.      Breath sounds: Wheezing (Faint right lower extremity wheeze) present. No rhonchi.   Abdominal:      General: Bowel sounds are normal. There is no distension.      Palpations: Abdomen is soft.      Tenderness: There is no abdominal tenderness. There is no guarding.   Musculoskeletal:         General: Swelling (2+ right lower extremity edema; 1+ left lower extremity edema.  No pedal edema) and deformity (Right lower extremity scarring status post debridement) present.      Cervical back: Normal range of motion.      Right lower leg: Edema (2+ right lower extremity edema) present.      Left lower leg: Edema (1+ left lower extremity edema) present.   Skin:     General: Skin is warm and dry.      Capillary Refill: Capillary refill takes less than 2 seconds.      Coloration: Skin is not jaundiced.      Findings: No lesion.   Neurological:      General: No focal deficit present.      Mental Status: She is alert and oriented to person, place, and time.   Psychiatric:         Mood and Affect: Mood normal.         Behavior: Behavior normal.          Laboratory:  Recent Labs     06/07/23  1252   WBC 6.3   RBC 4.31   HEMOGLOBIN 12.4   HEMATOCRIT 35.4*   MCV 82.1   MCH 28.8   MCHC 35.0   RDW 45.9   PLATELETCT 271   MPV 9.6     Recent Labs     06/07/23  1252   SODIUM 143   POTASSIUM 4.0   CHLORIDE 107   CO2 26   GLUCOSE 104*   BUN 11   CREATININE 0.58   CALCIUM 8.8     Recent Labs     06/07/23  1252   ALTSGPT 26   ASTSGOT 31   ALKPHOSPHAT 146*   TBILIRUBIN 0.3   GLUCOSE 104*         No results for input(s): NTPROBNP in the last 72 hours.      Recent Labs     06/07/23  1252   TROPONINT 19       Imaging:  CT-CTA CHEST PULMONARY ARTERY W/ RECONS   Final Result      1.  No CT evidence for pulmonary emboli.   2.  No pneumonia or pneumothorax.            DX-CHEST-PORTABLE (1 VIEW)   Final Result      No acute cardiopulmonary disease.              ASSESSMENT/PLAN  * Acute asthma exacerbation- (present on admission)  Assessment & Plan  History of mild intermittent asthma, uses albuterol as needed.  She does have an Advair disc, but tells me she does not know how to use this and so has not been using it.  Given Solu-Medrol 125 mg in the ED.  She was found to be hypoxic at 88% at 1 point, and so was given oxygen which improved her saturations.  She is not requiring oxygen at this time. CXR and CT PE are negative for acute findings  -Continue Solu-Medrol 40 mg IV twice daily  -Every 6 hours DuoNeb treatments  -Albuterol as needed  - IS  -No need for antibiotics.    Chronic pain syndrome- (present on admission)  Assessment & Plan  She has multiple chronic pain issues, including History of degenerative disc disease, status post cervical spine surgery, cervical radiculopathy and myelopathy; chronic low back pain; osteoarthritis in her knee; lower extremity pain.  She is on oxycodone and morphine, as well as other analgesics for chronic back and joint pain.   - Continue home medications  - As needed analgesics     Uncomplicated opioid dependence (CMS-HCC)- spine nv-  (present on admission)  Assessment & Plan  Secondary to chronic pain.  History of degenerative disc disease, status post cervical spine surgery, cervical radiculopathy and myelopathy; chronic low back pain; osteoarthritis in her knee; lower extremity pain.  She is on oxycodone and morphine, as well as other analgesics for chronic back and joint pain.  She is apparently supposed to be scheduled for surgery once her PCP clears her for this, but will likely need continued opioid use due to multiple other chronic pain issues.  - Continue home meds, gabapentin, Robaxin, oxycodone, and morphine    Lymphedema- (present on admission)  Assessment & Plan  Chronic bilateral lower extremity lymphedema secondary to venous insufficiency.  She normally takes 40 mg of Lasix daily as needed for increased edemia.  Did not take Lasix at this morning, 6/7/2023.  She says her legs are somewhat worse than normal.  -Continue 40 mg IV Lasix in the morning for the next 3 days    Justification for Admission Status  I anticipate this patient is appropriate for observation status at this time because requires overnight observation due to hypoxia and need for IV steroids for asthma exacerbation    Patient will need a Med/Surg bed on MEDICAL service. The need is secondary to close monitoring for hypoxia due to acute asthma exacerbation.      CODE STATUS: Full code  DVT prophylaxis: Heparin, SCDs    VTE prophylaxis: SCDs/TEDs and heparin ppx

## 2023-06-08 NOTE — PROGRESS NOTES
Report received from night shift RN. Assume care. Pt. AAOx4 pt is bed,  Assessment completed. VSS O2 at RA. Denies pain,  good CMS and pulses to coretta LE, denies numbness and tingling. Pt was update for the care for the day. White board updated, All question answered. Pt has call light within reach,  bed is in the lowest position. Pt has no other needs at this time.    1045 Pt is going to [facility] in good and stable conditions. All belongings taken.

## 2023-06-08 NOTE — DISCHARGE SUMMARY
"Discharge Summary    CHIEF COMPLAINT ON ADMISSION  Chief Complaint   Patient presents with    Shortness of Breath     BIBA for difficulty breathing. Pt states she is \"allergic to tobacco and my neighbor was smoking. I was going to my pulmonary appt and couldn't make it. I went to the wound care clinic and told them to call [facility]\". Pt 99% on RA for [facility]., 100% on RA here. Denies chest pain.        Reason for Admission  EMS     Admission Date  6/7/2023    CODE STATUS  Full Code    HPI & HOSPITAL COURSE   This is a 58-year-old female with mild intermittent asthma, chronic lower extremity lymphedema, chronic pain syndrome, anxiety, admitted with shortness of breath and wheezing after smelling smoke from the apartment neighbors.  On evaluation, she was wheezy.  She had no leukocytosis.  Electrolytes and renal function are normal.  Troponin was negative.  Chest CTA showed no PE, with no consolidation or pneumothorax.  Chest x-ray was clear.  She was initially requiring supplemental oxygen by nasal cannula.  She was felt to have asthma exacerbation, and thus admitted to the hospitalist service. She was placed on steroids, along with scheduled bronchodilators.  She had no evidence of bacterial infection and thus had no need for antibiotics.    She clinically improved.  Her shortness of breath has resolved, with improvement in respiratory status.  Wheezing has resolved.  She was able to be weaned off supplemental oxygen.  She had improved sense of wellbeing.  She remained hemodynamically stable and afebrile.    I have personally seen and examined the patient on the day of discharge. With her clinical improvement, she was deemed ready to discharge from the hospital as she did not have any further hospitalization needs. Patient felt comfortable going home. The discharge plan was discussed with the patient, with which she was agreeable to.     Therefore, she is discharged in good and stable condition to home with close " outpatient follow-up.        Discharge Date  6/8/2023      FOLLOW UP ITEMS POST DISCHARGE  -She will complete 5 days course of prednisone.  -Continue home Flovent, and as needed albuterol MDI.  She is prescribed DuoNeb nebulization, along with a nebulizer machine which she will also use as needed.  -Follow-up with outpatient pulmonology.  - counseled to seek immediate medical attention, or return to the ED for recurrent or worsening symptoms.      DISCHARGE DIAGNOSES  Principal Problem:    Acute asthma exacerbation (POA: Yes)  Active Problems:    Lymphedema (POA: Yes)    Uncomplicated opioid dependence (CMS-HCC)- spine nv (POA: Yes)    Chronic pain syndrome (POA: Yes)  Resolved Problems:    * No resolved hospital problems. *      FOLLOW UP  Future Appointments   Date Time Provider Department Center   6/14/2023  4:30 PM Deonna Mathew, PT [address] [place] [address]   7/13/2023  8:40 AM Micky Rubin M.D. [facility]     No follow-up provider specified.    MEDICATIONS ON DISCHARGE     Medication List        START taking these medications        Instructions   ipratropium-albuterol 0.5-2.5 (3) MG/3ML nebulizer solution  Commonly known as: DUONEB   Take 3 mL by nebulization every four hours as needed for Shortness of Breath.  Dose: 3 mL     predniSONE 20 MG Tabs  Commonly known as: DELTASONE   Take 2 Tablets by mouth every day for 5 days.  Dose: 40 mg            CONTINUE taking these medications        Instructions   * albuterol 108 (90 Base) MCG/ACT Aers inhalation aerosol   Inhale 2 Puffs every 6 hours as needed for Shortness of Breath.  Dose: 2 Puff     * albuterol 108 (90 Base) MCG/ACT Aers inhalation aerosol   Doctor's comments: Give albuterol that is patient or insurance preference please  Inhale 2 Puffs every four hours as needed for Shortness of Breath.  Dose: 2 Puff     ascorbic acid 500 MG tablet  Commonly known as: Vitamin C   Take 1 Tablet by mouth 2 times a day.  Dose: 500 mg     Biotin 5000 MCG Caps   Take 1  Capsule by mouth every day. Indications: dietary supplement  Dose: 1 Capsule     CALCIUM CARBONATE-VITAMIN D PO   Take 1 Tablet by mouth every day. Indications: Low Amount of Calcium in the Blood  Dose: 1 Tablet     ferrous sulfate 325 (65 Fe) MG tablet   Take 1 Tab by mouth every morning with breakfast.  Dose: 325 mg     fluticasone 44 MCG/ACT Aero  Commonly known as: Flovent HFA   Inhale 2 Puffs 2 times a day as needed (tobacco smoke). Everyday maintenance steroid inhaler  Dose: 2 Puff     fluticasone-salmeterol 250-50 MCG/ACT Aepb  Commonly known as: Advair   Inhale 1 Puff every 12 hours.  Dose: 1 Puff     furosemide 40 MG Tabs  Commonly known as: LASIX   Take 1 Tablet by mouth 1 time a day as needed (leg edema). Patient reports only takes if leg edema increases. She doesn't use daily as prescribed.   Indications: Edema  Dose: 40 mg     gabapentin 800 MG tablet  Commonly known as: NEURONTIN   Take 1 Tablet by mouth 3 times a day.  Dose: 800 mg     methocarbamol 500 MG Tabs  Commonly known as: ROBAXIN   Take 2 Tablets by mouth 4 times a day as needed (back pain and spasms). Indications: Musculoskeletal Pain  Dose: 1,000 mg     * morphine ER 15 MG Tbcr tablet  Commonly known as: MS CONTIN   Doctor's comments: Rx written by Micky Dalton MD ([facility]). Patient is followed by pain management.   Take 15 mg by mouth every 12 hours. Indications: Pain  Dose: 15 mg     * morphine ER 15 MG Tbcr tablet  Commonly known as: MS CONTIN   morphine ER 15 mg tablet,extended release   Take 1 tablet every 12 hours by oral route as directed for 30 days.   Do not drive, drink etoh while taking.     * morphine ER 30 MG Tbcr tablet  Commonly known as: MS CONTIN   morphine ER 30 mg tablet,extended release   TAKE 1 TABLET BY MOUTH EVERY 12 HOURS FOR 3 DAYS     Multivitamin Adult Tabs   Take 1 Tablet by mouth every day. Indications: Nutritional Support  Dose: 1 Tablet     Naloxone 4 MG/0.1ML Liqd  Commonly known as: NARCAN   naloxone  4 mg/actuation nasal spray   CALL 911. SPR CONTENTS OF ONE SPRAYER (0.1ML) INTO ONE NOSTRIL. REPEAT IN 2-3 MIN IF SYMPTOMS OF OPIOID EMERGENCY PERSIST, ALTERNATE NOSTRILS     oxyCODONE-acetaminophen  MG Tabs  Commonly known as: PERCOCET-10   Doctor's comments: Rx prescribed by [facility]  Take 1 Tablet by mouth every 6 hours as needed for Moderate Pain or Severe Pain. Indications: breakthrough pain betweeen morphine administration  Dose: 1 Tablet     potassium Chloride ER 20 MEQ Tbcr tablet  Commonly known as: K-TAB   Doctor's comments: Patient takes potassium with furosemide. She takes furosemide as needed only for leg edema.   Take 20 mEq by mouth 1 time a day as needed (Per patient, she takes with furosemide. ). Indications: takes with furosemide  Dose: 20 mEq     vitamin D3 1000 Unit (25 mcg) Tabs  Commonly known as: cholecalciferol   Take 1,000 Units by mouth every day. Indications: Vitamin D Deficiency, dietary supplement  Dose: 1,000 Units     zinc sulfate 220 (50 Zn) MG Caps  Commonly known as: ZINCATE   Take 220 mg by mouth every day. Indications: Impaired Wound Healing, dietary supplement  Dose: 220 mg           * This list has 5 medication(s) that are the same as other medications prescribed for you. Read the directions carefully, and ask your doctor or other care provider to review them with you.                STOP taking these medications      phentermine 37.5 MG capsule              Allergies  Allergies   Allergen Reactions    Tobacco [Nicotiana Tabacum] Shortness of Breath     Cigarette smoke causes SOB, rispatory issues    Other Environmental      Cigarette smoke       DIET  Orders Placed This Encounter   Procedures    Diet Order Diet: Regular     Standing Status:   Standing     Number of Occurrences:   1     Order Specific Question:   Diet:     Answer:   Regular [1]       ACTIVITY  As tolerated.  Weight bearing as tolerated    CONSULTATIONS  None    PROCEDURES  None    LABORATORY  Lab  Results   Component Value Date    SODIUM 143 06/07/2023    POTASSIUM 4.0 06/07/2023    CHLORIDE 107 06/07/2023    CO2 26 06/07/2023    GLUCOSE 104 (H) 06/07/2023    BUN 11 06/07/2023    CREATININE 0.58 06/07/2023    CREATININE 0.88 08/14/2010    GLOMRATE >59 08/14/2010        Lab Results   Component Value Date    WBC 4.9 06/08/2023    WBC 8.2 08/14/2010    HEMOGLOBIN 13.0 06/08/2023    HEMATOCRIT 38.9 06/08/2023    PLATELETCT 277 06/08/2023        Total time of the discharge process = 38 minutes.

## 2023-06-12 ENCOUNTER — APPOINTMENT (OUTPATIENT)
Dept: PHYSICAL THERAPY | Facility: REHABILITATION | Age: 58
End: 2023-06-12
Attending: NURSE PRACTITIONER
Payer: COMMERCIAL

## 2023-06-14 ENCOUNTER — TELEPHONE (OUTPATIENT)
Dept: HEALTH INFORMATION MANAGEMENT | Facility: OTHER | Age: 58
End: 2023-06-14
Payer: COMMERCIAL

## 2023-06-14 ENCOUNTER — APPOINTMENT (OUTPATIENT)
Dept: PHYSICAL THERAPY | Facility: REHABILITATION | Age: 58
End: 2023-06-14
Attending: NURSE PRACTITIONER
Payer: COMMERCIAL

## 2023-06-28 ENCOUNTER — APPOINTMENT (OUTPATIENT)
Dept: RADIOLOGY | Facility: MEDICAL CENTER | Age: 58
End: 2023-06-28
Attending: EMERGENCY MEDICINE
Payer: COMMERCIAL

## 2023-06-28 ENCOUNTER — HOSPITAL ENCOUNTER (EMERGENCY)
Facility: MEDICAL CENTER | Age: 58
End: 2023-06-28
Attending: EMERGENCY MEDICINE
Payer: COMMERCIAL

## 2023-06-28 VITALS
DIASTOLIC BLOOD PRESSURE: 67 MMHG | OXYGEN SATURATION: 95 % | TEMPERATURE: 97.6 F | HEIGHT: 63 IN | SYSTOLIC BLOOD PRESSURE: 129 MMHG | WEIGHT: 190 LBS | RESPIRATION RATE: 19 BRPM | BODY MASS INDEX: 33.66 KG/M2 | HEART RATE: 97 BPM

## 2023-06-28 DIAGNOSIS — R06.00 DYSPNEA, UNSPECIFIED TYPE: ICD-10-CM

## 2023-06-28 LAB — EKG IMPRESSION: NORMAL

## 2023-06-28 PROCEDURE — 700102 HCHG RX REV CODE 250 W/ 637 OVERRIDE(OP): Performed by: EMERGENCY MEDICINE

## 2023-06-28 PROCEDURE — 93005 ELECTROCARDIOGRAM TRACING: CPT

## 2023-06-28 PROCEDURE — 71045 X-RAY EXAM CHEST 1 VIEW: CPT

## 2023-06-28 PROCEDURE — A9270 NON-COVERED ITEM OR SERVICE: HCPCS | Performed by: EMERGENCY MEDICINE

## 2023-06-28 PROCEDURE — 99284 EMERGENCY DEPT VISIT MOD MDM: CPT

## 2023-06-28 PROCEDURE — 93005 ELECTROCARDIOGRAM TRACING: CPT | Performed by: EMERGENCY MEDICINE

## 2023-06-28 RX ORDER — OXYCODONE AND ACETAMINOPHEN 10; 325 MG/1; MG/1
1 TABLET ORAL ONCE
Status: COMPLETED | OUTPATIENT
Start: 2023-06-28 | End: 2023-06-28

## 2023-06-28 RX ADMIN — OXYCODONE AND ACETAMINOPHEN 1 TABLET: 10; 325 TABLET ORAL at 14:04

## 2023-06-28 ASSESSMENT — PAIN DESCRIPTION - PAIN TYPE: TYPE: CHRONIC PAIN

## 2023-06-28 ASSESSMENT — FIBROSIS 4 INDEX: FIB4 SCORE: 1.27

## 2023-06-28 NOTE — ED PROVIDER NOTES
ED Provider Note    CHIEF COMPLAINT  Chief Complaint   Patient presents with    Shortness of Breath     Pt bib EMS from home after pt reports being exposed to second hand cigarette smoke and strong fragrant smells in her apartment. Pt states she does not wear oxygen but uses a nebulizer. Pt thinks she is allergic to cigarette smoke. Pt states she takes lasix for her lymphedema and has been compliant with her medication.        EXTERNAL RECORDS REVIEWED  Discharge summary completed on 6/8/2023 that was here for the same wheezing after smelling smoke from her neighbor department.  She had a negative CT pulm angiogram, chest x-ray is negative.  Patient did require oxygen somewhat [place] at that point.    HPI/ROS      Karine Ovalle is a 58 y.o. female who presents complaint of shortness of breath.  She states that she is being exposed to secondhand smoke and a strong frequent smell because her have somewhat of a coughing fit and shortness of breath.  She states now it is no longer present.  She has had this in the past where she is almost passed out.  She has fever, shakes, chills, sweats, nausea, vomiting, swelling of her lower extremities that is new as she does have lymphedema.    PAST MEDICAL HISTORY   has a past medical history of Administrative encounter- RTC ACCESS/ADA PARATRANSIT ELIGIBILITY (06/04/2019), Anemia, Anemia, chronic disease (06/02/2022), Arthritis, At risk for falls, Back pain, Bronchitis, Cellulitis of right lower extremity (12/31/2021), Chickenpox, Chronic back pain, Cough, Dental disorder, Edema (12/13/2018), Frequent headaches, History of gastric bypass (04/25/2012), Hoarseness, persistent, Hypokalemia (06/07/2018), Hyponatremia (05/18/2012), Left hip pain (09/18/2018), Leg edema (08/09/2021), Leukocytosis (04/03/2022), Microcytic anemia- postop THR 2/2019 (12/13/2018), Mild intermittent asthma with acute exacerbation (04/25/2012), Morning headache, Multiple closed fractures of facial bone  (HCC) (06/01/2022), Near syncope (06/01/2022), Obesity, Open wound of right lower extremity- needs wound vac (04/04/2022), Pain (12/04/2018), Pain (02/04/2019), Primary osteoarthritis of both hips- dr nowak; left THR done feb 6, 2019; right THR 3/2018 (06/07/2018), Rhinitis, S/P hip replacement, bilateral (02/13/2019), Sepsis ([facility]) (04/03/2022), Serum gamma globulin increased (06/08/2022), Shortness of breath, Swelling of lower extremity, Tonsillitis, Toothache, Urinary incontinence, and Wheezing.    SURGICAL HISTORY   has a past surgical history that includes gastric bypass laparoscopic (2002); abdominal exploration; plastic surgery (2004); debridement (05/17/2012); appendectomy laparoscopic (03/21/2013); knee arthroplasty total (Right, 10/20/2015); mammoplasty augmentation (Bilateral, 2004); hip arthroplasty total (Right, 03/20/2018); cervical disk and fusion anterior (12/05/2018); corpectomy (12/05/2018); cervical fusion posterior (12/07/2018); cervical laminectomy posterior (12/07/2018); hip arthroplasty total (Left, 02/13/2019); other; other; Sleeve,Jack Vaso Thigh; primary c section; hip replacement, total; tonsillectomy; appendectomy; and arthroscopy, knee.    FAMILY HISTORY  Family History   Problem Relation Age of Onset    Diabetes Mother     Heart Disease Mother        SOCIAL HISTORY  Social History     Tobacco Use    Smoking status: Never     Passive exposure: Past    Smokeless tobacco: Never    Tobacco comments:     Allergies  to cigarette  smoke, creates shortness breathe   Vaping Use    Vaping Use: Never used   Substance and Sexual Activity    Alcohol use: Yes     Alcohol/week: 1.2 oz     Types: 2 Glasses of wine per week     Comment: 3-4 per week; pt stops alcohol use 1-week ago    Drug use: No     Frequency: 4.0 times per week    Sexual activity: Never       CURRENT MEDICATIONS  Home Medications       Reviewed by Carolann Ortega R.N. (Registered Nurse) on 06/28/23 at 1104  Med List Status: Not  "Addressed     Medication Last Dose Status   albuterol 108 (90 Base) MCG/ACT Aero Soln inhalation aerosol  Active   albuterol 108 (90 Base) MCG/ACT Aero Soln inhalation aerosol  Active   ascorbic acid (VITAMIN C) 500 MG tablet  Active   Biotin 5000 MCG Cap  Active   CALCIUM CARBONATE-VITAMIN D PO  Active   ferrous sulfate 325 (65 Fe) MG tablet  Active   fluticasone (FLOVENT HFA) 44 MCG/ACT Aerosol  Active   fluticasone-salmeterol (ADVAIR) 250-50 MCG/ACT AEROSOL POWDER, BREATH ACTIVATED  Active   furosemide (LASIX) 40 MG Tab  Active   gabapentin (NEURONTIN) 800 MG tablet  Active   ipratropium-albuterol (DUONEB) 0.5-2.5 (3) MG/3ML nebulizer solution  Active   methocarbamol (ROBAXIN) 500 MG Tab  Active   morphine ER (MS CONTIN) 15 MG Tab CR tablet  Active   morphine ER (MS CONTIN) 15 MG Tab CR tablet  Active   morphine ER (MS CONTIN) 30 MG Tab CR tablet  Active   Multiple Vitamin (MULTIVITAMIN ADULT) Tab  Active   Naloxone (NARCAN) 4 MG/0.1ML Liquid  Active   oxyCODONE-acetaminophen (PERCOCET-10)  MG Tab  Active   potassium Chloride ER (K-TAB) 20 MEQ Tab CR tablet  Active   vitamin D3 (CHOLECALCIFEROL) 1000 Unit (25 mcg) Tab  Active   zinc sulfate (ZINCATE) 220 (50 Zn) MG Cap  Active                    ALLERGIES  Allergies   Allergen Reactions    Tobacco [Nicotiana Tabacum] Shortness of Breath     Cigarette smoke causes SOB, rispatory issues    Other Environmental      Cigarette smoke       PHYSICAL EXAM  VITAL SIGNS: /67   Pulse 97   Temp 36.4 °C (97.6 °F)   Resp 19   Ht 1.6 m (5' 3\")   Wt 86.2 kg (190 lb)   LMP  (LMP Unknown)   SpO2 95%   BMI 33.66 kg/m²      Nursing notes and vitals reviewed.  Constitutional: Well developed, Well nourished, No acute distress, Non-toxic appearance.   Eyes: PERRLA, EOMI, Conjunctiva normal, No discharge.   HENT: Normocephalic, Atraumatic, moist mucous membranes, no facial edema Cardiovascular: Normal heart rate, Normal rhythm, No murmurs, No rubs, No gallops. "   Thorax & Lungs: No respiratory distress, No rales, No rhonchi, No wheezing, No chest tenderness.   Skin: Warm, Dry, No erythema, No rash.   Extremities: No deformity, no pitting pedal edema, good range of motion range of motion upper lower extremes bilaterally  Neurologic: Alert & oriented x 3, no focal abnormalities noted, acting appropriately on examination  Psychiatric: Affect normal for clinical presentation.      DIAGNOSTIC STUDIES / PROCEDURES  EKG  I have independently interpreted this EKG  Sinus rhythm on monitor      RADIOLOGY  I have independently interpreted the diagnostic imaging associated with this visit and am waiting the final reading from the radiologist.   My preliminary interpretation is as follows: Chest x-ray negative for infiltrate or pulmonary edema  Radiologist interpretation:   DX-CHEST-PORTABLE (1 VIEW)   Final Result      Stable enlargement of the cardiomediastinal silhouette without acute cardiopulmonary abnormality.            COURSE & MEDICAL DECISION MAKING    ED Observation Status? No; Patient does not meet criteria for ED Observation.     INITIAL ASSESSMENT, COURSE AND PLAN  Care Narrative: This is a pleasant 58-year-old the presents with shortness of breath.  She has no evidence of rest or distress here and her saturation of oxygen was 99% on room air.  She has no evidence of rest or distress respite failure.  X-ray shows no evidence of infiltrate, pulmonary edema or other significant abnormality.  She did ambulate through the department out any difficulty, no rest or distress.  I do not believe the patient requires hospitalization.  I do believe she has a hypersensitivity to smoke and fragrances causing her have a small respiratory distress syndrome.  Here though she has no evidence of rest or distress respiratory failure.  The patient does have albuterol at home to help.  She asked for Percocet tab as she missed her chronic pain medication where she received 1 tablet  10/325.      DISPOSITION AND DISCUSSIONS    Escalation of care considered, and ultimately not performed:acute inpatient care management, however at this time, the patient is most appropriate for outpatient management      Decision tools and prescription drugs considered including, but not limited to: I do not believe the patient requires a work-up as the patient had sensitivity reaction to smoke in a fragrance.  Here she has no symptoms whatsoever therefore do not believe she requires evaluation for heart failure, pneumonia, myocardial infarction, pulmonary limousine.    FINAL DIAGNOSIS  1. Dyspnea, unspecified type        DISPOSITION:  Patient will be discharged home in stable condition.    FOLLOW UP:  [facility], Emergency Dept  [address] [address]  [phone]    If symptoms worsen    Micky Rubin M.D.   [facility]  [address] [place] [id number] [phone]    Schedule an appointment as soon as possible for a visit in 1 week  As needed        Electronically signed by: Ramone Pettit D.O., 6/28/2023 12:20 PM

## 2023-06-28 NOTE — ED TRIAGE NOTES
"Chief Complaint   Patient presents with    Shortness of Breath     Pt bib EMS from home after pt reports being exposed to second hand cigarette smoke and strong fragrant smells in her apartment. Pt states she does not wear oxygen but uses a nebulizer. Pt thinks she is allergic to cigarette smoke. Pt states she takes lasix for her lymphedema and has been compliant with her medication.      BP (!) 128/92   Pulse 94   Temp 36.2 °C (97.1 °F) (Temporal)   Resp 18   Ht 1.6 m (5' 3\")   Wt 86.2 kg (190 lb)   LMP  (LMP Unknown)   SpO2 91%   BMI 33.66 kg/m²       "

## 2023-07-13 ENCOUNTER — OFFICE VISIT (OUTPATIENT)
Dept: MEDICAL GROUP | Age: 58
End: 2023-07-13
Payer: COMMERCIAL

## 2023-07-13 VITALS
DIASTOLIC BLOOD PRESSURE: 62 MMHG | WEIGHT: 205 LBS | TEMPERATURE: 97.2 F | HEIGHT: 63 IN | OXYGEN SATURATION: 95 % | SYSTOLIC BLOOD PRESSURE: 118 MMHG | BODY MASS INDEX: 36.32 KG/M2 | HEART RATE: 107 BPM

## 2023-07-13 DIAGNOSIS — M21.951: ICD-10-CM

## 2023-07-13 DIAGNOSIS — G89.29 CHRONIC BILATERAL LOW BACK PAIN WITH BILATERAL SCIATICA: ICD-10-CM

## 2023-07-13 DIAGNOSIS — M54.41 CHRONIC BILATERAL LOW BACK PAIN WITH BILATERAL SCIATICA: ICD-10-CM

## 2023-07-13 DIAGNOSIS — M54.42 CHRONIC BILATERAL LOW BACK PAIN WITH BILATERAL SCIATICA: ICD-10-CM

## 2023-07-13 DIAGNOSIS — J45.30 MILD PERSISTENT ASTHMA WITHOUT COMPLICATION: ICD-10-CM

## 2023-07-13 DIAGNOSIS — F11.20 UNCOMPLICATED OPIOID DEPENDENCE (HCC): ICD-10-CM

## 2023-07-13 DIAGNOSIS — I89.0 LYMPHEDEMA: ICD-10-CM

## 2023-07-13 DIAGNOSIS — Z02.9 ADMINISTRATIVE ENCOUNTER: ICD-10-CM

## 2023-07-13 DIAGNOSIS — I10 ESSENTIAL HYPERTENSION: ICD-10-CM

## 2023-07-13 DIAGNOSIS — Z23 NEED FOR VACCINATION: ICD-10-CM

## 2023-07-13 DIAGNOSIS — M51.36 DDD (DEGENERATIVE DISC DISEASE), LUMBAR: ICD-10-CM

## 2023-07-13 DIAGNOSIS — M50.30 DDD (DEGENERATIVE DISC DISEASE), CERVICAL: ICD-10-CM

## 2023-07-13 PROCEDURE — 99214 OFFICE O/P EST MOD 30 MIN: CPT | Performed by: INTERNAL MEDICINE

## 2023-07-13 PROCEDURE — 3078F DIAST BP <80 MM HG: CPT | Performed by: INTERNAL MEDICINE

## 2023-07-13 PROCEDURE — 3074F SYST BP LT 130 MM HG: CPT | Performed by: INTERNAL MEDICINE

## 2023-07-13 RX ORDER — METHOCARBAMOL 500 MG/1
1000 TABLET, FILM COATED ORAL 4 TIMES DAILY PRN
Qty: 240 TABLET | Refills: 5 | Status: SHIPPED | OUTPATIENT
Start: 2023-07-13 | End: 2024-01-30

## 2023-07-13 RX ORDER — BECLOMETHASONE DIPROPIONATE HFA 80 UG/1
2 AEROSOL, METERED RESPIRATORY (INHALATION) 2 TIMES DAILY
Qty: 7.3 G | Refills: 11 | Status: SHIPPED | OUTPATIENT
Start: 2023-07-13 | End: 2024-03-25

## 2023-07-13 RX ORDER — FLUTICASONE PROPIONATE 44 UG/1
2 AEROSOL, METERED RESPIRATORY (INHALATION) 2 TIMES DAILY PRN
Qty: 1 EACH | Refills: 11 | Status: SHIPPED | OUTPATIENT
Start: 2023-07-13

## 2023-07-13 RX ORDER — GABAPENTIN 800 MG/1
800 TABLET ORAL 3 TIMES DAILY
Qty: 360 TABLET | Refills: 3 | Status: SHIPPED | OUTPATIENT
Start: 2023-07-13

## 2023-07-13 ASSESSMENT — ENCOUNTER SYMPTOMS
CARDIOVASCULAR NEGATIVE: 1
NEUROLOGICAL NEGATIVE: 1
RESPIRATORY NEGATIVE: 1
CONSTITUTIONAL NEGATIVE: 1
MUSCULOSKELETAL NEGATIVE: 1
GASTROINTESTINAL NEGATIVE: 1
PSYCHIATRIC NEGATIVE: 1
EYES NEGATIVE: 1

## 2023-07-13 ASSESSMENT — FIBROSIS 4 INDEX: FIB4 SCORE: 1.27

## 2023-07-13 NOTE — PROGRESS NOTES
Subjective     Karine Ovalle is a 58 y.o. female who presents with Paperwork (FMLA )  And  The patient is here for followup of chronic medical problems listed below. The patient is compliant with medications and having no side effects from them. Denies chest pain, abdominal pain, dyspnea, myalgias, or cough.   Outpatient Medications Prior to Visit   Medication Sig Dispense Refill    ipratropium-albuterol (DUONEB) 0.5-2.5 (3) MG/3ML nebulizer solution Inhale 3 mL by nebulization every four hours as needed for Shortness of Breath. 90 Each 0    Naloxone (NARCAN) 4 MG/0.1ML Liquid naloxone 4 mg/actuation nasal spray   CALL 911. SPR CONTENTS OF ONE SPRAYER (0.1ML) INTO ONE NOSTRIL. REPEAT IN 2-3 MIN IF SYMPTOMS OF OPIOID EMERGENCY PERSIST, ALTERNATE NOSTRILS      morphine ER (MS CONTIN) 15 MG Tab CR tablet morphine ER 15 mg tablet,extended release   Take 1 tablet every 12 hours by oral route as directed for 30 days.   Do not drive, drink etoh while taking.      morphine ER (MS CONTIN) 30 MG Tab CR tablet morphine ER 30 mg tablet,extended release   TAKE 1 TABLET BY MOUTH EVERY 12 HOURS FOR 3 DAYS      albuterol 108 (90 Base) MCG/ACT Aero Soln inhalation aerosol Inhale 2 Puffs every four hours as needed for Shortness of Breath. 1 Each 11    fluticasone-salmeterol (ADVAIR) 250-50 MCG/ACT AEROSOL POWDER, BREATH ACTIVATED Inhale 1 Puff every 12 hours. 1 Each 5    albuterol 108 (90 Base) MCG/ACT Aero Soln inhalation aerosol Inhale 2 Puffs every 6 hours as needed for Shortness of Breath. 8.5 g 0    morphine ER (MS CONTIN) 15 MG Tab CR tablet Take 15 mg by mouth every 12 hours. Indications: Pain      potassium Chloride ER (K-TAB) 20 MEQ Tab CR tablet Take 20 mEq by mouth 1 time a day as needed (Per patient, she takes with furosemide. ). Indications: takes with furosemide      vitamin D3 (CHOLECALCIFEROL) 1000 Unit (25 mcg) Tab Take 1,000 Units by mouth every day. Indications: Vitamin D Deficiency, dietary supplement       Biotin 5000 MCG Cap Take 1 Capsule by mouth every day. Indications: dietary supplement      zinc sulfate (ZINCATE) 220 (50 Zn) MG Cap Take 220 mg by mouth every day. Indications: Impaired Wound Healing, dietary supplement       Multiple Vitamin (MULTIVITAMIN ADULT) Tab Take 1 Tablet by mouth every day. Indications: Nutritional Support      ascorbic acid (VITAMIN C) 500 MG tablet Take 1 Tablet by mouth 2 times a day. 100 Tablet 4    oxyCODONE-acetaminophen (PERCOCET-10)  MG Tab Take 1 Tablet by mouth every 6 hours as needed for Moderate Pain or Severe Pain. Indications: breakthrough pain betweeen morphine administration  0    CALCIUM CARBONATE-VITAMIN D PO Take 1 Tablet by mouth every day. Indications: Low Amount of Calcium in the Blood      ferrous sulfate 325 (65 Fe) MG tablet Take 1 Tab by mouth every morning with breakfast. 30 Tab 1    furosemide (LASIX) 40 MG Tab Take 1 Tablet by mouth 1 time a day as needed (leg edema). Patient reports only takes if leg edema increases. She doesn't use daily as prescribed.   Indications: Edema 90 Tablet 4    gabapentin (NEURONTIN) 800 MG tablet Take 1 Tablet by mouth 3 times a day. 360 Tablet 3    methocarbamol (ROBAXIN) 500 MG Tab Take 2 Tablets by mouth 4 times a day as needed (back pain and spasms). Indications: Musculoskeletal Pain 240 Tablet 5    fluticasone (FLOVENT HFA) 44 MCG/ACT Aerosol Inhale 2 Puffs 2 times a day as needed (tobacco smoke). Everyday maintenance steroid inhaler 1 Each 11     No facility-administered medications prior to visit.     Admission on 2023, Discharged on 2023   Component Date Value    Report 2023                      Value:[facility] Emergency Dept.    Test Date:  2023  Pt Name:    ANA MAYOBBS                  Department: ER  MRN:        [id number]                      Room:  Gender:     Female                       Technician: [id number]  :        1965                   Requested By:ER TRIAGE  PROTOCOL  Order #:    [id number]                    Reading MD:    Measurements  Intervals                                Axis  Rate:       95                           P:          44  IN:         163                          QRS:        0  QRSD:       89                           T:          33  QT:         376  QTc:        473    Interpretive Statements  Sinus rhythm  Compared to ECG 06/07/2023 11:04:12  Atrial abnormality no longer present        Lab Results   Component Value Date/Time    HBA1C 4.7 06/02/2022 02:46 AM    HBA1C 5.1 07/26/2021 11:14 AM     Lab Results   Component Value Date/Time    SODIUM 143 06/07/2023 12:52 PM    POTASSIUM 4.0 06/07/2023 12:52 PM    CHLORIDE 107 06/07/2023 12:52 PM    CO2 26 06/07/2023 12:52 PM    GLUCOSE 104 (H) 06/07/2023 12:52 PM    BUN 11 06/07/2023 12:52 PM    CREATININE 0.58 06/07/2023 12:52 PM    CREATININE 0.88 08/14/2010 12:00 AM    BUNCREATRAT 13 08/14/2010 12:00 AM    GLOMRATE >59 08/14/2010 12:00 AM    ALKPHOSPHAT 146 (H) 06/07/2023 12:52 PM    ASTSGOT 31 06/07/2023 12:52 PM    ALTSGPT 26 06/07/2023 12:52 PM    TBILIRUBIN 0.3 06/07/2023 12:52 PM     Lab Results   Component Value Date/Time    INR 1.25 (H) 06/01/2022 03:30 PM    INR 1.00 02/13/2019 11:44 AM    INR 1.01 12/04/2018 11:15 AM     Lab Results   Component Value Date/Time    CHOLSTRLTOT 147 07/26/2021 11:14 AM    LDL 63 07/26/2021 11:14 AM    HDL 75 07/26/2021 11:14 AM    TRIGLYCERIDE 44 07/26/2021 11:14 AM       No results found for: TESTOSTERONE  Lab Results   Component Value Date/Time    TSH 2.110 08/14/2010 12:00 AM     Lab Results   Component Value Date/Time    FREET4 0.72 05/03/2016 07:32 AM     Lab Results   Component Value Date/Time    URICACID 5.6 08/12/2014 04:49 PM     No components found for: VITB12  Lab Results   Component Value Date/Time    25HYDROXY 44 06/02/2022 02:42 PM    25HYDROXY 50 07/26/2021 11:14 AM               HPI    Review of Systems   Constitutional: Negative.    HENT: Negative.    "  Eyes: Negative.    Respiratory: Negative.     Cardiovascular: Negative.    Gastrointestinal: Negative.    Genitourinary: Negative.    Musculoskeletal: Negative.    Skin: Negative.    Neurological: Negative.    Endo/Heme/Allergies: Negative.    Psychiatric/Behavioral: Negative.                Objective     /62 (BP Location: Left arm, Patient Position: Sitting, BP Cuff Size: Large adult)   Pulse (!) 107   Temp 36.2 °C (97.2 °F) (Temporal)   Ht 1.6 m (5' 3\")   Wt 93 kg (205 lb)   LMP  (LMP Unknown)   SpO2 95%   BMI 36.31 kg/m²      Physical Exam  Vitals reviewed.   Constitutional:       General: She is not in acute distress.     Appearance: She is well-developed. She is not diaphoretic.   HENT:      Head: Normocephalic and atraumatic.      Right Ear: External ear normal.      Left Ear: External ear normal.      Nose: Nose normal.      Mouth/Throat:      Pharynx: No oropharyngeal exudate.   Eyes:      General: No scleral icterus.        Right eye: No discharge.         Left eye: No discharge.      Conjunctiva/sclera: Conjunctivae normal.      Pupils: Pupils are equal, round, and reactive to light.   Neck:      Thyroid: No thyromegaly.      Vascular: No JVD.      Trachea: No tracheal deviation.   Cardiovascular:      Rate and Rhythm: Normal rate and regular rhythm.      Heart sounds: Normal heart sounds. No murmur heard.     No friction rub. No gallop.   Pulmonary:      Effort: Pulmonary effort is normal. No respiratory distress.      Breath sounds: Normal breath sounds. No stridor. No wheezing or rales.   Chest:      Chest wall: No tenderness.   Abdominal:      General: Bowel sounds are normal. There is no distension.      Palpations: Abdomen is soft. There is no mass.      Tenderness: There is no abdominal tenderness. There is no guarding or rebound.   Musculoskeletal:         General: Swelling, tenderness and deformity present. Normal range of motion.      Cervical back: Normal range of motion and neck " supple.      Comments: Rt lower leg with significant postoperative scarring erythema and edema and tenderness   Lymphadenopathy:      Cervical: No cervical adenopathy.   Skin:     General: Skin is warm and dry.      Coloration: Skin is not pale.      Findings: No erythema or rash.   Neurological:      Mental Status: She is alert and oriented to person, place, and time.      Cranial Nerves: No cranial nerve deficit.      Motor: No abnormal muscle tone.      Coordination: Coordination normal.      Deep Tendon Reflexes: Reflexes are normal and symmetric. Reflexes normal.   Psychiatric:         Behavior: Behavior normal.         Thought Content: Thought content normal.         Judgment: Judgment normal.       Admission on 2023, Discharged on 2023   Component Date Value    Report 2023                      Value:[facility] Emergency Dept.    Test Date:  2023  Pt Name:    ANA LAWLER                  Department: ER  MRN:        [id number]                      Room:  Gender:     Female                       Technician: [id number]  :        1965                   Requested By:ER TRIAGE PROTOCOL  Order #:    [id number]                    Reading MD:    Measurements  Intervals                                Axis  Rate:       95                           P:          44  AR:         163                          QRS:        0  QRSD:       89                           T:          33  QT:         376  QTc:        473    Interpretive Statements  Sinus rhythm  Compared to ECG 2023 11:04:12  Atrial abnormality no longer present        Lab Results   Component Value Date/Time    HBA1C 4.7 2022 02:46 AM    HBA1C 5.1 2021 11:14 AM     Lab Results   Component Value Date/Time    SODIUM 143 2023 12:52 PM    POTASSIUM 4.0 2023 12:52 PM    CHLORIDE 107 2023 12:52 PM    CO2 26 2023 12:52 PM    GLUCOSE 104 (H) 2023 12:52 PM    BUN 11 2023 12:52 PM     CREATININE 0.58 06/07/2023 12:52 PM    CREATININE 0.88 08/14/2010 12:00 AM    BUNCREATRAT 13 08/14/2010 12:00 AM    GLOMRATE >59 08/14/2010 12:00 AM    ALKPHOSPHAT 146 (H) 06/07/2023 12:52 PM    ASTSGOT 31 06/07/2023 12:52 PM    ALTSGPT 26 06/07/2023 12:52 PM    TBILIRUBIN 0.3 06/07/2023 12:52 PM     Lab Results   Component Value Date/Time    INR 1.25 (H) 06/01/2022 03:30 PM    INR 1.00 02/13/2019 11:44 AM    INR 1.01 12/04/2018 11:15 AM     Lab Results   Component Value Date/Time    CHOLSTRLTOT 147 07/26/2021 11:14 AM    LDL 63 07/26/2021 11:14 AM    HDL 75 07/26/2021 11:14 AM    TRIGLYCERIDE 44 07/26/2021 11:14 AM       No results found for: TESTOSTERONE  Lab Results   Component Value Date/Time    TSH 2.110 08/14/2010 12:00 AM     Lab Results   Component Value Date/Time    FREET4 0.72 05/03/2016 07:32 AM     Lab Results   Component Value Date/Time    URICACID 5.6 08/12/2014 04:49 PM     No components found for: VITB12  Lab Results   Component Value Date/Time    25HYDROXY 44 06/02/2022 02:42 PM    25HYDROXY 50 07/26/2021 11:14 AM                              Assessment & Plan        1. Administrative [id number] [facility] paperwork for workplace accommodation for chronic DDD and DJD hips and shoulders bilaterally status post multiple joint replacements  Paperwork filled out and sent to letter for now 6 months of work from home due to medical conditions    2. Deformity of lower extremity, right- post surgical  Follow-up with wound clinic orthopedic surgery and plastic surgery    3. Lymphedema  Follow-up with lymphedema clinic    4. Essential hypertension  Good control continue current regimen    5. Uncomplicated opioid dependence (CMS-HCC)- spine nv   Under good control. Continue same regimen.'    6. Chronic bilateral low back pain with bilateral sciatica   Under good control. Continue same regimen.    - methocarbamol (ROBAXIN) 500 MG Tab; Take 2 Tablets by mouth 4 times a day as needed (back pain and spasms).  Indications: Musculoskeletal Pain  Dispense: 240 Tablet; Refill: 5  - gabapentin (NEURONTIN) 800 MG tablet; Take 1 Tablet by mouth 3 times a day. Indications: Neuropathic Pain  Dispense: 360 Tablet; Refill: 3    7. DDD (degenerative disc disease), lumbar   Under good control. Continue same regimen.'  - methocarbamol (ROBAXIN) 500 MG Tab; Take 2 Tablets by mouth 4 times a day as needed (back pain and spasms). Indications: Musculoskeletal Pain  Dispense: 240 Tablet; Refill: 5    8. DDD (degenerative disc disease), cervical- MOD-[facility]VERE SPINE NV- dr brennan; fusion and laminectomy C3-T1 12/5/2018 dr brennan     - methocarbamol (ROBAXIN) 500 MG Tab; Take 2 Tablets by mouth 4 times a day as needed (back pain and spasms). Indications: Musculoskeletal Pain  Dispense: 240 Tablet; Refill: 5    9. Need for vaccination     - Zoster Vac Recomb Adjuvanted (SHINGRIX) 50 MCG/0.5ML Recon Susp; Inject 0.5 mL into the shoulder, thigh, or buttocks one time for 1 dose.  Dispense: 0.5 mL; Refill: 0    10. Mild persistent asthma without complication       Under good control. Continue same regimen.    - beclomethasone HFA (QVAR REDIHALER) 80 MCG/ACT inhaler; Inhale 2 Puffs 2 times a day.  Dispense: 7.3 g; Refill: 11

## 2023-08-31 ENCOUNTER — HOSPITAL ENCOUNTER (EMERGENCY)
Facility: MEDICAL CENTER | Age: 58
End: 2023-08-31
Attending: EMERGENCY MEDICINE
Payer: COMMERCIAL

## 2023-08-31 ENCOUNTER — APPOINTMENT (OUTPATIENT)
Dept: RADIOLOGY | Facility: MEDICAL CENTER | Age: 58
End: 2023-08-31
Attending: EMERGENCY MEDICINE
Payer: COMMERCIAL

## 2023-08-31 VITALS
HEART RATE: 97 BPM | DIASTOLIC BLOOD PRESSURE: 86 MMHG | BODY MASS INDEX: 33.66 KG/M2 | TEMPERATURE: 97.5 F | OXYGEN SATURATION: 93 % | WEIGHT: 190 LBS | SYSTOLIC BLOOD PRESSURE: 132 MMHG | HEIGHT: 63 IN | RESPIRATION RATE: 18 BRPM
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
## 2023-08-31 PROCEDURE — A9270 NON-COVERED ITEM OR SERVICE: HCPCS | Performed by: EMERGENCY MEDICINE

## 2023-08-31 PROCEDURE — 99284 EMERGENCY DEPT VISIT MOD MDM: CPT

## 2023-08-31 PROCEDURE — 71045 X-RAY EXAM CHEST 1 VIEW: CPT

## 2023-08-31 PROCEDURE — 72131 CT LUMBAR SPINE W/O DYE: CPT

## 2023-08-31 PROCEDURE — 73502 X-RAY EXAM HIP UNI 2-3 VIEWS: CPT | Mod: RT

## 2023-08-31 PROCEDURE — 700102 HCHG RX REV CODE 250 W/ 637 OVERRIDE(OP): Performed by: EMERGENCY MEDICINE

## 2023-08-31 RX ORDER — OXYCODONE HYDROCHLORIDE AND ACETAMINOPHEN 5; 325 MG/1; MG/1
1 TABLET ORAL ONCE
Status: COMPLETED | OUTPATIENT
Start: 2023-08-31 | End: 2023-08-31

## 2023-08-31 RX ORDER — IBUPROFEN 600 MG/1
600 TABLET ORAL ONCE
Status: COMPLETED | OUTPATIENT
Start: 2023-08-31 | End: 2023-08-31

## 2023-08-31 RX ADMIN — OXYCODONE AND ACETAMINOPHEN 1 TABLET: 5; 325 TABLET ORAL at 21:51

## 2023-08-31 RX ADMIN — IBUPROFEN 600 MG: 600 TABLET ORAL at 21:51

## 2023-08-31 ASSESSMENT — PAIN DESCRIPTION - PAIN TYPE
TYPE: ACUTE PAIN
TYPE: CHRONIC PAIN

## 2023-08-31 ASSESSMENT — FIBROSIS 4 INDEX: FIB4 SCORE: 1.27

## 2023-09-01 NOTE — ED PROVIDER NOTES
ED Provider Note    CHIEF COMPLAINT  Chief Complaint   Patient presents with    GLF     Pt states she sustained a MGLF yesterday while going to her pain management doctors appt. Reports increase in pain to R ribs, R hip and lower back. Hx hip replacement and neck fusion. Pt states pain with walking now. CMS intact distally.        HPI/ROS      Karine Ovalle is a 58 y.o. female who presents with right hip pain, right chest pain, and low back pain.  The patient states that she was at her pain control physician's office yesterday when she fell.  She did strike her head and only has a mild headache.  She did not have any loss of conscious.  She states that she struck the right side of her chest where she has some discomfort and some difficulty with breathing.  The majority of her pain is in the right hip region where she had a hip replacement.  She states she cannot ambulate due to the pain.  She does not have any paresthesias nor functional loss of her extremities.  She does not have any associated nausea or vomiting with her headache.  Her headache is mild at this time.  She does not take any anticoagulants.    PAST MEDICAL HISTORY   has a past medical history of Administrative encounter- RTC ACCESS/ADA PARATRANSIT ELIGIBILITY (06/04/2019), Anemia, Anemia, chronic disease (06/02/2022), Arthritis, At risk for falls, Back pain, Bronchitis, Cellulitis of right lower extremity (12/31/2021), Chickenpox, Chronic back pain, Cough, Dental disorder, Edema (12/13/2018), Frequent headaches, History of gastric bypass (04/25/2012), Hoarseness, persistent, Hypokalemia (06/07/2018), Hyponatremia (05/18/2012), Left hip pain (09/18/2018), Leg edema (08/09/2021), Leukocytosis (04/03/2022), Microcytic anemia- postop THR 2/2019 (12/13/2018), Mild intermittent asthma with acute exacerbation (04/25/2012), Morning headache, Multiple closed fractures of facial bone (HCC) (06/01/2022), Near syncope (06/01/2022), Obesity, Open wound of  right lower extremity- needs wound vac (04/04/2022), Pain (12/04/2018), Pain (02/04/2019), Primary osteoarthritis of both hips- dr nowak; left THR done feb 6, 2019; right THR 3/2018 (06/07/2018), Rhinitis, S/P hip replacement, bilateral (02/13/2019), Sepsis (HCC) (04/03/2022), Serum gamma globulin increased (06/08/2022), Shortness of breath, Swelling of lower extremity, Tonsillitis, Toothache, Urinary incontinence, and Wheezing.    SURGICAL HISTORY   has a past surgical history that includes gastric bypass laparoscopic (2002); abdominal exploration; plastic surgery (2004); debridement (05/17/2012); appendectomy laparoscopic (03/21/2013); knee arthroplasty total (Right, 10/20/2015); mammoplasty augmentation (Bilateral, 2004); hip arthroplasty total (Right, 03/20/2018); cervical disk and fusion anterior (12/05/2018); corpectomy (12/05/2018); cervical fusion posterior (12/07/2018); cervical laminectomy posterior (12/07/2018); hip arthroplasty total (Left, 02/13/2019); other; other; Sleeve,Jack Vaso Thigh; primary c section; hip replacement, total; tonsillectomy; appendectomy; and arthroscopy, knee.    FAMILY HISTORY  Family History   Problem Relation Age of Onset    Diabetes Mother     Heart Disease Mother        SOCIAL HISTORY  Social History     Tobacco Use    Smoking status: Never     Passive exposure: Past    Smokeless tobacco: Never    Tobacco comments:     Allergies  to cigarette  smoke, creates shortness breathe   Vaping Use    Vaping Use: Never used   Substance and Sexual Activity    Alcohol use: Yes     Alcohol/week: 1.2 oz     Types: 2 Glasses of wine per week     Comment: 3-4 per week; pt stops alcohol use 1-week ago    Drug use: No     Frequency: 4.0 times per week    Sexual activity: Never       CURRENT MEDICATIONS  Home Medications       Reviewed by Crystal Dukes R.N. (Registered Nurse) on 08/31/23 at 2032  Med List Status: Partial     Medication Last Dose Status   albuterol 108 (90 Base) MCG/ACT Aero  "Soln inhalation aerosol  Active   albuterol 108 (90 Base) MCG/ACT Aero Soln inhalation aerosol  Active   ascorbic acid (VITAMIN C) 500 MG tablet  Active   beclomethasone HFA (QVAR REDIHALER) 80 MCG/ACT inhaler  Active   Biotin 5000 MCG Cap  Active   CALCIUM CARBONATE-VITAMIN D PO  Active   ferrous sulfate 325 (65 Fe) MG tablet  Active   fluticasone (FLOVENT HFA) 44 MCG/ACT Aerosol  Active   fluticasone-salmeterol (ADVAIR) 250-50 MCG/ACT AEROSOL POWDER, BREATH ACTIVATED  Active   furosemide (LASIX) 40 MG Tab  Active   gabapentin (NEURONTIN) 800 MG tablet  Active   ipratropium-albuterol (DUONEB) 0.5-2.5 (3) MG/3ML nebulizer solution  Active   methocarbamol (ROBAXIN) 500 MG Tab  Active   morphine ER (MS CONTIN) 15 MG Tab CR tablet  Active   morphine ER (MS CONTIN) 15 MG Tab CR tablet  Active   morphine ER (MS CONTIN) 30 MG Tab CR tablet  Active   Multiple Vitamin (MULTIVITAMIN ADULT) Tab  Active   Naloxone (NARCAN) 4 MG/0.1ML Liquid  Active   oxyCODONE-acetaminophen (PERCOCET-10)  MG Tab  Active   potassium Chloride ER (K-TAB) 20 MEQ Tab CR tablet  Active   vitamin D3 (CHOLECALCIFEROL) 1000 Unit (25 mcg) Tab  Active   zinc sulfate (ZINCATE) 220 (50 Zn) MG Cap  Active                    ALLERGIES  Allergies   Allergen Reactions    Tobacco [Nicotiana Tabacum] Shortness of Breath     Cigarette smoke causes SOB, rispatory issues    Other Environmental      Cigarette smoke       PHYSICAL EXAM  VITAL SIGNS: /76   Pulse 100   Temp 36.3 °C (97.4 °F) (Temporal)   Resp 18   Ht 1.6 m (5' 3\")   Wt 86.2 kg (190 lb)   LMP  (LMP Unknown)   SpO2 98%   BMI 33.66 kg/m²    In general the patient appears uncomfortable    Head is normocephalic atraumatic, ears TMs intact without hemotympanums, nares and mouth are moist without signs of trauma    Cervical and thoracic spine has no midline tenderness or step-offs.  Lumbar spine does have some midline tenderness with no step-offs    Pulmonary the patient's lungs are clear " to auscultation bilaterally with tenderness laterally on the right without obvious deformities and no crepitus    Cardiovascular S1-S2 with a slightly tachycardic rate    GI abdomen soft    Extremities patient does have tenderness throughout the right hip region without obvious deformities.  This does seem to be worse with internal and external rotation of the right lower extremity.  There is no shortening to the right lower extremity.  Extremities otherwise atraumatic    Neurologic examination GCS of 15      RADIOLOGY  I have independently interpreted the diagnostic imaging associated with this visit and am waiting the final reading from the radiologist.   My preliminary interpretation is as follows: Strays reviewed of the pelvis and hip and prosthesis in place with no obvious fracture  Radiologist interpretation:   CT-LSPINE W/O PLUS RECONS   Final Result         1.  No acute traumatic bony injury of the lumbar spine.   2.  Hepatomegaly      DX-CHEST-PORTABLE (1 VIEW)   Final Result         1.  Mild pulmonary edema and/or infiltrates.   2.  Cardiomegaly      DX-HIP-COMPLETE - UNILATERAL 2+ RIGHT   Final Result         1.  No radiographic evidence of acute traumatic injury.            COURSE & MEDICAL DECISION MAKING    This a 58-year-old female who presents the emergency department with right-sided chest pain, low back pain, and right hip pain after a fall.  I did perform a CT scan of the lumbar sacral spine and there is no obvious fracture.  Also performed plain films of the right chest and the right hip which were reviewed and showed no obvious fracture.  I suspect her pain is more from a contusion to the chest, lumbar sacral region, and right hip.  She does see a chronic pain specialist.  I did agree to give her Percocet while she was in the emergency department.  She had gastric bypass in the past therefore hold off on prescribing anti-inflammatories.  I like her to contact her pain  tomorrow  for further guidance for pain control.  At the time of discharge she continues to be neurologically intact.  She will be discharged with instructions to follow-up with her pain  and return if she is acutely worse.    FINAL DIAGNOSIS  1.  Fall  2.  Right chest contusion  3.  Lumbar sacral contusion  4.  Right hip contusion    Disposition  Patient will be discharged in stable condition       Electronically signed by: Jatin Arguelles M.D., 8/31/2023 9:42 PM

## 2023-09-01 NOTE — DISCHARGE INSTRUCTIONS
Follow-up with your pain  as discussed for further pain control.  Take your narcotics as prescribed.

## 2023-09-01 NOTE — ED TRIAGE NOTES
Chief Complaint   Patient presents with    GLF     Pt states she sustained a MGLF yesterday while going to her pain management doctors appt. Reports increase in pain to R ribs, R hip and lower back. Hx hip replacement and neck fusion. Pt states pain with walking now. CMS intact distally.      Pt WC to triage for above.     States she normally is able to ambulate with cane.     Takes percocet 10 and MC contin at home.     Pt to lobby, educated on triage process.

## 2023-09-01 NOTE — ED NOTES
Taken patient from triage waiting room, via wheelchair, alert/ oriented x 4.Verified patient identification.  Assumed patient care.   Placed on patient room. Changed clothes to hospital gown. Connected to cardiac monitor.   Given the call light and instructed to call for any assistance needed/ or concerns.   Bed on lowest position, side rails up, breaks locked. Awaiting for ERP.

## 2023-09-07 ENCOUNTER — OFFICE VISIT (OUTPATIENT)
Dept: URGENT CARE | Facility: CLINIC | Age: 58
End: 2023-09-07
Payer: COMMERCIAL

## 2023-09-07 ENCOUNTER — APPOINTMENT (OUTPATIENT)
Dept: RADIOLOGY | Facility: IMAGING CENTER | Age: 58
End: 2023-09-07
Attending: REGISTERED NURSE
Payer: COMMERCIAL

## 2023-09-07 VITALS
TEMPERATURE: 97.1 F | WEIGHT: 185 LBS | DIASTOLIC BLOOD PRESSURE: 82 MMHG | SYSTOLIC BLOOD PRESSURE: 130 MMHG | BODY MASS INDEX: 32.78 KG/M2 | HEART RATE: 92 BPM | RESPIRATION RATE: 16 BRPM | OXYGEN SATURATION: 97 % | HEIGHT: 63 IN

## 2023-09-07 DIAGNOSIS — S93.401A SPRAIN OF RIGHT ANKLE, UNSPECIFIED LIGAMENT, INITIAL ENCOUNTER: ICD-10-CM

## 2023-09-07 DIAGNOSIS — M25.571 ACUTE RIGHT ANKLE PAIN: ICD-10-CM

## 2023-09-07 PROCEDURE — 3079F DIAST BP 80-89 MM HG: CPT | Performed by: REGISTERED NURSE

## 2023-09-07 PROCEDURE — 3075F SYST BP GE 130 - 139MM HG: CPT | Performed by: REGISTERED NURSE

## 2023-09-07 PROCEDURE — 73610 X-RAY EXAM OF ANKLE: CPT | Mod: TC,FY,RT | Performed by: REGISTERED NURSE

## 2023-09-07 PROCEDURE — 99214 OFFICE O/P EST MOD 30 MIN: CPT | Performed by: REGISTERED NURSE

## 2023-09-07 ASSESSMENT — FIBROSIS 4 INDEX: FIB4 SCORE: 1.27

## 2023-09-08 NOTE — PROGRESS NOTES
"Subjective:   Karine Ovalle is a 58 y.o. female who presents for Ankle Pain (X 8 days,  Rt ankle pain due to a GLF, swollen, unable to walk.)      HPI  Seen and evaluated in the emergency department on 8/31/2023 for a fall.  Subsequently discharged home.  Since then has had some ongoing pain in the right ankle and is requesting an x-ray.  When asked where pain is she states everywhere. states it looks swollen.  No new numbness or tingling.  No new loss of function.  Chronic pain from multiple conditions, does use chronic opioids.  States she does not need a work note.    ROS: Negative unless mentioned in HPI    Medications, Allergies, and current problem list reviewed today in Epic.     Objective:     /82   Pulse 92   Temp 36.2 °C (97.1 °F) (Temporal)   Resp 16   Ht 1.6 m (5' 3\")   Wt 83.9 kg (185 lb)   SpO2 97%     Physical Exam  Vitals and nursing note reviewed.   Constitutional:       Appearance: Normal appearance. She is not ill-appearing or toxic-appearing.   Cardiovascular:      Rate and Rhythm: Normal rate and regular rhythm.   Pulmonary:      Effort: Pulmonary effort is normal.      Breath sounds: Normal breath sounds.   Musculoskeletal:         General: Normal range of motion.      Cervical back: Normal range of motion.      Comments: Bilateral lower extremity swelling that is equal.  No obvious right ankle swelling or deformity.  Ankle is tender throughout all areas including the lateral and medial aspects and the dorsal and plantar aspects of the foot.  No bruising. Normal cap refill, neurovascular intact distally.  Does demonstrate dorsal and plantarflexion as well as inversion and eversion but this is limited secondary to pain   Skin:     General: Skin is warm and dry.      Capillary Refill: Capillary refill takes less than 2 seconds.   Neurological:      Mental Status: She is alert and oriented to person, place, and time.   Psychiatric:         Mood and Affect: Mood normal. "         RADIOLOGY RESULTS   DX-ANKLE 3+ VIEWS RIGHT    Result Date: 9/7/2023 9/7/2023 7:06 PM HISTORY/REASON FOR EXAM:  Pain/Deformity Following Trauma. TECHNIQUE/EXAM DESCRIPTION AND NUMBER OF VIEWS:  3 views of the RIGHT ankle. COMPARISON: Tibia fibula radiograph 6/1/2022 FINDINGS: There may be subtalar dislocation. No definite acute fracture seen. There are degenerative changes of the ankle. No ankle dislocation. Bone mineralization is decreased..     Possible subtalar dislocation.            Assessment/Plan:     Diagnosis and associated orders:     1. Sprain of right ankle, unspecified ligament, initial encounter  DX-ANKLE 3+ VIEWS RIGHT    Walking Boot    CRUTCHES    Referral to Orthopedics           Comments/MDM:   Differential diagnosis discussed     58-year-old female with multiple comorbidities and chronic pain issues which complicate assessment of right foot and ankle.  Was seen in ER for a fall on 8/31/2023, since then states she has developed some right ankle pain that she thinks is from the initial injury, it is not getting better.  Is having some pain with ambulation so she is coming into clinic in a wheelchair.  Vital signs within normal limits.  No obvious right ankle edema when comparing the right to left, has lower extremity edema at baseline, she is diffusely tender in the right ankle and foot, no bruising, distal neurovascular intact.  No obvious right ankle deformity is noted.  Is able to demonstrate dorsal and plantarflexion, also inversion and eversion but this is all limited secondary to pain.  Reviewed the x-ray there is no acute fracture, possible subtalar dislocation mentioned.  Given her level of discomfort and tenderness will place in walking boot with crutches, place urgent referral to orthopedics.  We did discuss going to the ER now for further investigation into the possible subtalar dislocation, but at this time patient would like to try walking boot with crutches and orthopedics  referral to [facility].     Return to clinic or go to ED if symptoms worsen or persist. Indications for ED discussed at length. Patient/Parent/Guardian voices understanding. Follow-up with your primary care provider in 3-5 days. Red flag symptoms discussed. All side effects of medication discussed including allergic response, GI upset, tendon injury, rash, sedation etc.    I personally reviewed prior external notes and test results pertinent to today's visit as well as additional imaging and testing completed in clinic today.     Please note that this dictation was created using voice recognition software. I have made every reasonable attempt to correct obvious errors, but I expect that there are errors of grammar and possibly content that I did not discover before finalizing the note.    This note was electronically signed by ADITYA Coy

## 2023-09-22 ENCOUNTER — HOSPITAL ENCOUNTER (OUTPATIENT)
Dept: LAB | Facility: MEDICAL CENTER | Age: 58
End: 2023-09-22
Attending: INTERNAL MEDICINE
Payer: COMMERCIAL

## 2023-09-22 DIAGNOSIS — E78.5 DYSLIPIDEMIA: ICD-10-CM

## 2023-09-22 DIAGNOSIS — R73.01 IFG (IMPAIRED FASTING GLUCOSE): ICD-10-CM

## 2023-09-22 DIAGNOSIS — E55.9 VITAMIN D DEFICIENCY: ICD-10-CM

## 2023-09-22 LAB
25(OH)D3 SERPL-MCNC: 32 NG/ML (ref 30–100)
ALBUMIN SERPL BCP-MCNC: 3.9 G/DL (ref 3.2–4.9)
ALBUMIN/GLOB SERPL: 1 G/DL
ALP SERPL-CCNC: 143 U/L (ref 30–99)
ALT SERPL-CCNC: 11 U/L (ref 2–50)
ANION GAP SERPL CALC-SCNC: 12 MMOL/L (ref 7–16)
AST SERPL-CCNC: 21 U/L (ref 12–45)
BASOPHILS # BLD AUTO: 0.6 % (ref 0–1.8)
BASOPHILS # BLD: 0.05 K/UL (ref 0–0.12)
BILIRUB SERPL-MCNC: 0.4 MG/DL (ref 0.1–1.5)
BUN SERPL-MCNC: 28 MG/DL (ref 8–22)
CALCIUM ALBUM COR SERPL-MCNC: 9.3 MG/DL (ref 8.5–10.5)
CALCIUM SERPL-MCNC: 9.2 MG/DL (ref 8.4–10.2)
CHLORIDE SERPL-SCNC: 102 MMOL/L (ref 96–112)
CHOLEST SERPL-MCNC: 169 MG/DL (ref 100–199)
CO2 SERPL-SCNC: 22 MMOL/L (ref 20–33)
CREAT SERPL-MCNC: 0.57 MG/DL (ref 0.5–1.4)
EOSINOPHIL # BLD AUTO: 0.51 K/UL (ref 0–0.51)
EOSINOPHIL NFR BLD: 6.3 % (ref 0–6.9)
ERYTHROCYTE [DISTWIDTH] IN BLOOD BY AUTOMATED COUNT: 45.5 FL (ref 35.9–50)
EST. AVERAGE GLUCOSE BLD GHB EST-MCNC: 94 MG/DL
FASTING STATUS PATIENT QL REPORTED: NORMAL
GFR SERPLBLD CREATININE-BSD FMLA CKD-EPI: 105 ML/MIN/1.73 M 2
GLOBULIN SER CALC-MCNC: 3.9 G/DL (ref 1.9–3.5)
GLUCOSE SERPL-MCNC: 96 MG/DL (ref 65–99)
HBA1C MFR BLD: 4.9 % (ref 4–5.6)
HCT VFR BLD AUTO: 41.5 % (ref 37–47)
HDLC SERPL-MCNC: 71 MG/DL
HGB BLD-MCNC: 14.4 G/DL (ref 12–16)
IMM GRANULOCYTES # BLD AUTO: 0.02 K/UL (ref 0–0.11)
IMM GRANULOCYTES NFR BLD AUTO: 0.2 % (ref 0–0.9)
LDLC SERPL CALC-MCNC: 81 MG/DL
LYMPHOCYTES # BLD AUTO: 2.71 K/UL (ref 1–4.8)
LYMPHOCYTES NFR BLD: 33.5 % (ref 22–41)
MCH RBC QN AUTO: 27.9 PG (ref 27–33)
MCHC RBC AUTO-ENTMCNC: 34.7 G/DL (ref 32.2–35.5)
MCV RBC AUTO: 80.4 FL (ref 81.4–97.8)
MONOCYTES # BLD AUTO: 0.69 K/UL (ref 0–0.85)
MONOCYTES NFR BLD AUTO: 8.5 % (ref 0–13.4)
NEUTROPHILS # BLD AUTO: 4.11 K/UL (ref 1.82–7.42)
NEUTROPHILS NFR BLD: 50.9 % (ref 44–72)
NRBC # BLD AUTO: 0 K/UL
NRBC BLD-RTO: 0 /100 WBC (ref 0–0.2)
PLATELET # BLD AUTO: 278 K/UL (ref 164–446)
PMV BLD AUTO: 9.7 FL (ref 9–12.9)
POTASSIUM SERPL-SCNC: 4.2 MMOL/L (ref 3.6–5.5)
PROT SERPL-MCNC: 7.8 G/DL (ref 6–8.2)
RBC # BLD AUTO: 5.16 M/UL (ref 4.2–5.4)
SODIUM SERPL-SCNC: 136 MMOL/L (ref 135–145)
TRIGL SERPL-MCNC: 83 MG/DL (ref 0–149)
TSH SERPL DL<=0.005 MIU/L-ACNC: 2.2 UIU/ML (ref 0.38–5.33)
WBC # BLD AUTO: 8.1 K/UL (ref 4.8–10.8)

## 2023-09-22 PROCEDURE — 82306 VITAMIN D 25 HYDROXY: CPT

## 2023-09-22 PROCEDURE — 84443 ASSAY THYROID STIM HORMONE: CPT

## 2023-09-22 PROCEDURE — 83036 HEMOGLOBIN GLYCOSYLATED A1C: CPT

## 2023-09-22 PROCEDURE — 80053 COMPREHEN METABOLIC PANEL: CPT

## 2023-09-22 PROCEDURE — 36415 COLL VENOUS BLD VENIPUNCTURE: CPT

## 2023-09-22 PROCEDURE — 80061 LIPID PANEL: CPT

## 2023-09-22 PROCEDURE — 85025 COMPLETE CBC W/AUTO DIFF WBC: CPT

## 2023-11-08 ENCOUNTER — APPOINTMENT (OUTPATIENT)
Dept: MEDICAL GROUP | Age: 58
End: 2023-11-08
Payer: COMMERCIAL

## 2023-11-09 DIAGNOSIS — E66.9 OBESITY (BMI 30-39.9): ICD-10-CM

## 2023-11-09 RX ORDER — PHENTERMINE HYDROCHLORIDE 37.5 MG/1
37.5 CAPSULE ORAL EVERY MORNING
Qty: 90 CAPSULE | Refills: 0 | Status: SHIPPED | OUTPATIENT
Start: 2023-11-09 | End: 2023-11-10 | Stop reason: SDUPTHER

## 2023-11-10 DIAGNOSIS — E66.9 OBESITY (BMI 30-39.9): ICD-10-CM

## 2023-11-10 RX ORDER — PHENTERMINE HYDROCHLORIDE 37.5 MG/1
37.5 CAPSULE ORAL EVERY MORNING
Qty: 90 CAPSULE | Refills: 0 | Status: SHIPPED | OUTPATIENT
Start: 2023-11-10 | End: 2024-02-08

## 2023-11-13 RX ORDER — PHENTERMINE HYDROCHLORIDE 37.5 MG/1
37.5 CAPSULE ORAL EVERY MORNING
Qty: 90 CAPSULE | Refills: 0 | Status: SHIPPED | OUTPATIENT
Start: 2023-11-13 | End: 2024-02-11

## 2023-11-14 ENCOUNTER — OFFICE VISIT (OUTPATIENT)
Dept: MEDICAL GROUP | Age: 58
End: 2023-11-14
Payer: COMMERCIAL

## 2023-11-14 VITALS
DIASTOLIC BLOOD PRESSURE: 60 MMHG | TEMPERATURE: 98 F | BODY MASS INDEX: 34.91 KG/M2 | HEIGHT: 63 IN | SYSTOLIC BLOOD PRESSURE: 100 MMHG | WEIGHT: 197 LBS | OXYGEN SATURATION: 92 % | HEART RATE: 100 BPM

## 2023-11-14 DIAGNOSIS — M15.9 PRIMARY OSTEOARTHRITIS INVOLVING MULTIPLE JOINTS: ICD-10-CM

## 2023-11-14 DIAGNOSIS — Z23 NEED FOR VACCINATION: ICD-10-CM

## 2023-11-14 DIAGNOSIS — Z02.9 ADMINISTRATIVE ENCOUNTER: ICD-10-CM

## 2023-11-14 PROCEDURE — 3078F DIAST BP <80 MM HG: CPT | Performed by: STUDENT IN AN ORGANIZED HEALTH CARE EDUCATION/TRAINING PROGRAM

## 2023-11-14 PROCEDURE — 3074F SYST BP LT 130 MM HG: CPT | Performed by: STUDENT IN AN ORGANIZED HEALTH CARE EDUCATION/TRAINING PROGRAM

## 2023-11-14 PROCEDURE — 99213 OFFICE O/P EST LOW 20 MIN: CPT | Performed by: STUDENT IN AN ORGANIZED HEALTH CARE EDUCATION/TRAINING PROGRAM

## 2023-11-14 ASSESSMENT — FIBROSIS 4 INDEX: FIB4 SCORE: 1.32

## 2023-11-14 ASSESSMENT — ENCOUNTER SYMPTOMS
PALPITATIONS: 0
DIZZINESS: 0
FEVER: 0
SHORTNESS OF BREATH: 0
BLOOD IN STOOL: 0
CHILLS: 0
WEAKNESS: 0
HEADACHES: 0

## 2023-11-14 NOTE — FLOWSHEET NOTE
04/04/22 2000   Events/Summary/Plan   Events/Summary/Plan Flovent given w/spacer   Vital Signs   Pulse (!) 110   Respiration 17   Pulse Oximetry 92 %   $ Pulse Oximetry (Spot Check) Yes   Respiratory Assessment   Respiratory Pattern Within Normal Limits   Level of Consciousness Alert   Chest Exam   Work Of Breathing / Effort Mild   Breath Sounds   RUL Breath Sounds Clear   RML Breath Sounds Clear;Diminished   RLL Breath Sounds Diminished   ZORAN Breath Sounds Clear   LLL Breath Sounds Diminished   Secretions   Cough Non Productive   Oxygen   O2 (LPM) 0   O2 Delivery Device None - Room Air     
   04/05/22 2024   Events/Summary/Plan   Events/Summary/Plan MDI w/spacer given   Skin Inspection Respiratory Device Intact   Vital Signs   Pulse 100   Respiration 17   Pulse Oximetry 97 %   $ Pulse Oximetry (Spot Check) Yes   Respiratory Assessment   Respiratory Pattern Within Normal Limits   Level of Consciousness Responds to voice   Chest Exam   Work Of Breathing / Effort Shallow   Breath Sounds   RUL Breath Sounds Clear   RML Breath Sounds Diminished   RLL Breath Sounds Diminished   ZORAN Breath Sounds Clear   LLL Breath Sounds Diminished   Secretions   Cough Non Productive   Oxygen   O2 (LPM) 1   O2 Delivery Device Nasal Cannula     
   04/08/22 0717   Events/Summary/Plan   Events/Summary/Plan MDI Tx Given/02 Chk   Vital Signs   Pulse (!) 114   Respiration 20   Pulse Oximetry 95 %   $ Pulse Oximetry (Spot Check) Yes   Respiratory Assessment   Respiratory Pattern Within Normal Limits   Level of Consciousness Alert   Chest Exam   Work Of Breathing / Effort Within Normal Limits   Breath Sounds   RUL Breath Sounds Clear;Diminished   RML Breath Sounds Clear;Diminished   RLL Breath Sounds Clear;Diminished   ZORAN Breath Sounds Clear;Diminished   LLL Breath Sounds Clear;Diminished   Oxygen   O2 (LPM) 0   O2 Delivery Device None - Room Air     
   04/08/22 1903   Inhalation Therapy Treatment   $ MDI/DPI Given MDI/DPI x 1     
   04/09/22 0800   Events/Summary/Plan   Skin Inspection Respiratory Device Intact   Vital Signs   Pulse 68   Respiration 16   Pulse Oximetry 91 %   $ Pulse Oximetry (Spot Check) Yes   Breath Sounds   RUL Breath Sounds Diminished   RML Breath Sounds Diminished   RLL Breath Sounds Diminished   ZORAN Breath Sounds Diminished   LLL Breath Sounds Diminished   Oxygen   O2 Delivery Device None - Room Air     
   04/09/22 1914   Inhalation Therapy Treatment   $ MDI/DPI Given MDI/DPI x 1     
unknown

## 2023-11-14 NOTE — LETTER
November 14, 2023    To Whom It May Concern:         This is confirmation that Karine Ovalle attended her scheduled appointment with Matt Rosado M.D. on 11/14/23 for evaluation to continue FMLA leave/work from home.  My current assessment determines that Mrs. 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         If you have any questions please do not hesitate to call me at the phone number listed below.    Sincerely,          Matt Rosado M.D.  [phone]

## 2023-11-15 NOTE — PROGRESS NOTES
"Subjective:     CC: [facility] paperwork    HPI:   Karine presents today for [facility] paperwork clearance however she does not have with her the documents.  Patient has multiple co morbidities limitating significantly her mobility and ambulation.  She already underwent bilateral hip replacement, right knee replacement and is likely to get left knee replacement in February next year. She is following with [facility] next month to determine when this surgery will take place.        Health Maintenance: Completed    ROS:  Review of Systems   Constitutional:  Negative for chills, fever and malaise/fatigue.   Respiratory:  Negative for shortness of breath.    Cardiovascular:  Negative for chest pain and palpitations.   Gastrointestinal:  Negative for blood in stool and melena.   Neurological:  Negative for dizziness, weakness and headaches.       Objective:     Exam:  /60 (BP Location: Left arm, Patient Position: Sitting, BP Cuff Size: Adult long)   Pulse 100   Temp 36.7 °C (98 °F) (Temporal)   Ht 1.6 m (5' 3\")   Wt 89.4 kg (197 lb)   LMP  (LMP Unknown)   SpO2 92%   BMI 34.90 kg/m²  Body mass index is 34.9 kg/m².    Physical Exam  Constitutional:       General: She is not in acute distress.  HENT:      Head: Normocephalic and atraumatic.      Mouth/Throat:      Mouth: Mucous membranes are moist.   Cardiovascular:      Rate and Rhythm: Normal rate and regular rhythm.      Heart sounds: No murmur heard.  Pulmonary:      Effort: No respiratory distress.      Breath sounds: No wheezing.   Abdominal:      General: There is no distension.      Palpations: Abdomen is soft.      Tenderness: There is no abdominal tenderness. There is no guarding or rebound.   Musculoskeletal:         General: Deformity present.      Cervical back: Normal range of motion and neck supple.      Right lower leg: Edema present.      Left lower leg: Edema present.      Comments: ROM severely reduced both knees, hips and back.   Skin:     General: Skin is " warm.      Capillary Refill: Capillary refill takes less than 2 seconds.      Coloration: Skin is not jaundiced.   Neurological:      General: No focal deficit present.      Mental Status: She is alert.   Psychiatric:         Mood and Affect: Mood normal.         A chaperone was offered to the patient during today's exam.: Patient declined.    Labs: none    Assessment & Plan:     58 y.o. female with the following -     1. Administrative [id number] [facility] paperwork for workplace accommodation for chronic DDD and DJD hips and shoulders bilaterally status post multiple joint replacements  - Failed to bring paperwork  - I signed and giver her a letter to give to her employer in case she needs it   - Make appointment once paperwork obtained    2. Primary osteoarthritis involving multiple joints  - Severe OA had already bilateral hip replacements anf right knee replacement and will have left knee replacement soon    3. Need for vaccination  - Given vaccine  - Pneumococcal Conjugate Vaccine 20-Valent (6 wks+)         Referral for genetic research was offered. Patient Declined.    I spent a total of 20 minutes with record review, exam, communication with the patient, communication with other providers, and documentation of this encounter.      Return if symptoms worsen or fail to improve.    Please note that this dictation was created using voice recognition software. I have made every reasonable attempt to correct obvious errors, but I expect that there are errors of grammar and possibly content that I did not discover before finalizing the note.

## 2023-11-15 NOTE — PATIENT INSTRUCTIONS
- Continue home meds  - Get needed FMLA paperwork so I can sign it   - Follow up as soon as you have papers ready

## 2024-01-04 ENCOUNTER — PATIENT MESSAGE (OUTPATIENT)
Dept: MEDICAL GROUP | Age: 59
End: 2024-01-04
Payer: COMMERCIAL

## 2024-01-04 DIAGNOSIS — I89.0 LYMPHEDEMA: ICD-10-CM

## 2024-01-11 ENCOUNTER — HOSPITAL ENCOUNTER (EMERGENCY)
Facility: MEDICAL CENTER | Age: 59
End: 2024-01-11
Attending: STUDENT IN AN ORGANIZED HEALTH CARE EDUCATION/TRAINING PROGRAM
Payer: COMMERCIAL

## 2024-01-11 ENCOUNTER — APPOINTMENT (OUTPATIENT)
Dept: RADIOLOGY | Facility: MEDICAL CENTER | Age: 59
End: 2024-01-11
Attending: STUDENT IN AN ORGANIZED HEALTH CARE EDUCATION/TRAINING PROGRAM
Payer: COMMERCIAL

## 2024-01-11 VITALS
SYSTOLIC BLOOD PRESSURE: 154 MMHG | WEIGHT: 197 LBS | TEMPERATURE: 97.3 F | BODY MASS INDEX: 34.9 KG/M2 | DIASTOLIC BLOOD PRESSURE: 69 MMHG | OXYGEN SATURATION: 97 % | HEART RATE: 98 BPM | RESPIRATION RATE: 18 BRPM

## 2024-01-11 DIAGNOSIS — S12.091A OTHER CLOSED NONDISPLACED FRACTURE OF FIRST CERVICAL VERTEBRA, INITIAL ENCOUNTER (HCC): ICD-10-CM

## 2024-01-11 DIAGNOSIS — S01.01XA LACERATION OF SCALP, INITIAL ENCOUNTER: ICD-10-CM

## 2024-01-11 PROCEDURE — 304999 HCHG REPAIR-SIMPLE/INTERMED LEVEL 1

## 2024-01-11 PROCEDURE — 99284 EMERGENCY DEPT VISIT MOD MDM: CPT

## 2024-01-11 PROCEDURE — 90715 TDAP VACCINE 7 YRS/> IM: CPT | Performed by: STUDENT IN AN ORGANIZED HEALTH CARE EDUCATION/TRAINING PROGRAM

## 2024-01-11 PROCEDURE — 304217 HCHG IRRIGATION SYSTEM

## 2024-01-11 PROCEDURE — 700102 HCHG RX REV CODE 250 W/ 637 OVERRIDE(OP): Performed by: STUDENT IN AN ORGANIZED HEALTH CARE EDUCATION/TRAINING PROGRAM

## 2024-01-11 PROCEDURE — 303747 HCHG EXTRA SUTURE

## 2024-01-11 PROCEDURE — A9270 NON-COVERED ITEM OR SERVICE: HCPCS | Performed by: STUDENT IN AN ORGANIZED HEALTH CARE EDUCATION/TRAINING PROGRAM

## 2024-01-11 PROCEDURE — 90471 IMMUNIZATION ADMIN: CPT

## 2024-01-11 PROCEDURE — 700111 HCHG RX REV CODE 636 W/ 250 OVERRIDE (IP): Performed by: STUDENT IN AN ORGANIZED HEALTH CARE EDUCATION/TRAINING PROGRAM

## 2024-01-11 PROCEDURE — 72125 CT NECK SPINE W/O DYE: CPT

## 2024-01-11 RX ORDER — BUPIVACAINE HYDROCHLORIDE 5 MG/ML
10 INJECTION, SOLUTION EPIDURAL; INTRACAUDAL ONCE
Status: COMPLETED | OUTPATIENT
Start: 2024-01-11 | End: 2024-01-11

## 2024-01-11 RX ORDER — MORPHINE SULFATE 15 MG/1
15 TABLET ORAL ONCE
Status: COMPLETED | OUTPATIENT
Start: 2024-01-11 | End: 2024-01-11

## 2024-01-11 RX ORDER — OXYCODONE HYDROCHLORIDE 10 MG/1
10 TABLET ORAL ONCE
Status: COMPLETED | OUTPATIENT
Start: 2024-01-11 | End: 2024-01-11

## 2024-01-11 RX ADMIN — BUPIVACAINE HYDROCHLORIDE 10 ML: 5 INJECTION, SOLUTION EPIDURAL; INTRACAUDAL at 18:39

## 2024-01-11 RX ADMIN — OXYCODONE HYDROCHLORIDE 10 MG: 10 TABLET ORAL at 17:58

## 2024-01-11 RX ADMIN — MORPHINE SULFATE 15 MG: 15 TABLET ORAL at 17:57

## 2024-01-11 RX ADMIN — CLOSTRIDIUM TETANI TOXOID ANTIGEN (FORMALDEHYDE INACTIVATED), CORYNEBACTERIUM DIPHTHERIAE TOXOID ANTIGEN (FORMALDEHYDE INACTIVATED), BORDETELLA PERTUSSIS TOXOID ANTIGEN (GLUTARALDEHYDE INACTIVATED), BORDETELLA PERTUSSIS FILAMENTOUS HEMAGGLUTININ ANTIGEN (FORMALDEHYDE INACTIVATED), BORDETELLA PERTUSSIS PERTACTIN ANTIGEN, AND BORDETELLA PERTUSSIS FIMBRIAE 2/3 ANTIGEN 0.5 ML: 5; 2; 2.5; 5; 3; 5 INJECTION, SUSPENSION INTRAMUSCULAR at 20:31

## 2024-01-11 ASSESSMENT — FIBROSIS 4 INDEX: FIB4 SCORE: 1.32

## 2024-01-11 ASSESSMENT — PAIN DESCRIPTION - PAIN TYPE: TYPE: ACUTE PAIN

## 2024-01-12 NOTE — ED PROVIDER NOTES
ED Provider Note    CHIEF COMPLAINT  Chief Complaint   Patient presents with    T-5000 Head Injury       EXTERNAL RECORDS REVIEWED  Outpatient Notes neurosurgery op note of C spine fusion in 2018    HPI/ROS  LIMITATION TO HISTORY   Select: : None  OUTSIDE HISTORIAN(S):      Karine Ovalle is a 58 y.o. female who presents with facial laceration after ground-level fall.  The patient reports she tripped and fell hitting her head directly on the floor.  She denies LOC or vomiting since the event.  She does report a history of C-spine fusion in 2018.    PAST MEDICAL HISTORY   has a past medical history of Administrative encounter- RTC ACCESS/ADA PARATRANSIT ELIGIBILITY (06/04/2019), Anemia, Anemia, chronic disease (06/02/2022), Arthritis, At risk for falls, Back pain, Bronchitis, Cellulitis of right lower extremity (12/31/2021), Chickenpox, Chronic back pain, Cough, Dental disorder, Edema (12/13/2018), Frequent headaches, History of gastric bypass (04/25/2012), Hoarseness, persistent, Hypokalemia (06/07/2018), Hyponatremia (05/18/2012), Left hip pain (09/18/2018), Leg edema (08/09/2021), Leukocytosis (04/03/2022), Microcytic anemia- postop THR 2/2019 (12/13/2018), Mild intermittent asthma with acute exacerbation (04/25/2012), Morning headache, Multiple closed fractures of facial bone (HCC) (06/01/2022), Near syncope (06/01/2022), Obesity, Open wound of right lower extremity- needs wound vac (04/04/2022), Pain (12/04/2018), Pain (02/04/2019), Primary osteoarthritis of both hips- dr nowak; left THR done feb 6, 2019; right THR 3/2018 (06/07/2018), Rhinitis, S/P hip replacement, bilateral (02/13/2019), Sepsis (HCC) (04/03/2022), Serum gamma globulin increased (06/08/2022), Shortness of breath, Swelling of lower extremity, Tonsillitis, Toothache, Urinary incontinence, and Wheezing.    SURGICAL HISTORY   has a past surgical history that includes gastric bypass laparoscopic (2002); abdominal exploration; plastic surgery  (2004); debridement (05/17/2012); appendectomy laparoscopic (03/21/2013); knee arthroplasty total (Right, 10/20/2015); mammoplasty augmentation (Bilateral, 2004); hip arthroplasty total (Right, 03/20/2018); cervical disk and fusion anterior (12/05/2018); corpectomy (12/05/2018); cervical fusion posterior (12/07/2018); cervical laminectomy posterior (12/07/2018); hip arthroplasty total (Left, 02/13/2019); other; other; Sleeve,Jack Vaso Thigh; primary c section; hip replacement, total; tonsillectomy; appendectomy; and arthroscopy, knee.    FAMILY HISTORY  Family History   Problem Relation Age of Onset    Diabetes Mother     Heart Disease Mother        SOCIAL HISTORY  Social History     Tobacco Use    Smoking status: Never     Passive exposure: Past    Smokeless tobacco: Never    Tobacco comments:     Allergies  to cigarette  smoke, creates shortness breathe   Vaping Use    Vaping Use: Never used   Substance and Sexual Activity    Alcohol use: Yes     Alcohol/week: 1.2 oz     Types: 2 Glasses of wine per week     Comment: 3-4 per week; pt stops alcohol use 1-week ago    Drug use: No     Frequency: 4.0 times per week    Sexual activity: Never       CURRENT MEDICATIONS  Home Medications       Reviewed by Rose Arellano R.N. (Registered Nurse) on 01/11/24 at 1734  Med List Status: Partial     Medication Last Dose Status   albuterol 108 (90 Base) MCG/ACT Aero Soln inhalation aerosol  Active   albuterol 108 (90 Base) MCG/ACT Aero Soln inhalation aerosol  Active   ascorbic acid (VITAMIN C) 500 MG tablet  Active   beclomethasone HFA (QVAR REDIHALER) 80 MCG/ACT inhaler  Active   Biotin 5000 MCG Cap  Active   CALCIUM CARBONATE-VITAMIN D PO  Active   ferrous sulfate 325 (65 Fe) MG tablet  Active   fluticasone (FLOVENT HFA) 44 MCG/ACT Aerosol  Active   fluticasone-salmeterol (ADVAIR) 250-50 MCG/ACT AEROSOL POWDER, BREATH ACTIVATED  Active   furosemide (LASIX) 40 MG Tab  Active   gabapentin (NEURONTIN) 800 MG tablet  Active    gabapentin (NEURONTIN) 800 MG tablet  Active   ipratropium-albuterol (DUONEB) 0.5-2.5 (3) MG/3ML nebulizer solution  Active   methocarbamol (ROBAXIN) 500 MG Tab  Active   morphine ER (MS CONTIN) 15 MG Tab CR tablet  Active   morphine ER (MS CONTIN) 15 MG Tab CR tablet  Active   morphine ER (MS CONTIN) 30 MG Tab CR tablet  Active   Multiple Vitamin (MULTIVITAMIN ADULT) Tab  Active   Naloxone (NARCAN) 4 MG/0.1ML Liquid  Active   oxyCODONE-acetaminophen (PERCOCET-10)  MG Tab  Active   oxyCODONE-acetaminophen (PERCOCET-10)  MG Tab  Active   phentermine 37.5 MG capsule  Active   phentermine 37.5 MG capsule  Active   potassium Chloride ER (K-TAB) 20 MEQ Tab CR tablet  Active   vitamin D3 (CHOLECALCIFEROL) 1000 Unit (25 mcg) Tab  Active   zinc sulfate (ZINCATE) 220 (50 Zn) MG Cap  Active                    ALLERGIES  Allergies   Allergen Reactions    Tobacco [Nicotiana Tabacum] Shortness of Breath     Cigarette smoke causes SOB, rispatory issues    Other Environmental      Cigarette smoke  Other reaction(s): Not available       PHYSICAL EXAM  VITAL SIGNS: BP (!) 154/69   Pulse 98   Temp 36.3 °C (97.3 °F)   Resp 18   Wt 89.4 kg (197 lb)   LMP  (LMP Unknown)   SpO2 97%   BMI 34.90 kg/m²    Physical Exam  Vitals and nursing note reviewed.   Constitutional:       Appearance: She is well-developed.   HENT:      Head: Normocephalic.   Eyes:      Extraocular Movements: Extraocular movements intact.      Pupils: Pupils are equal, round, and reactive to light.   Cardiovascular:      Rate and Rhythm: Normal rate and regular rhythm.   Pulmonary:      Effort: Pulmonary effort is normal.      Breath sounds: Normal breath sounds.   Abdominal:      Palpations: Abdomen is soft.      Tenderness: There is no abdominal tenderness.   Musculoskeletal:      Cervical back: Normal range of motion.   Skin:     Comments: 6 cm linear laceration to the middle of the forehead extending up into the hairline.  There is some mild  involvement of the galea at the superior aspect of the laceration   Neurological:      General: No focal deficit present.      Mental Status: She is alert and oriented to person, place, and time.      Comments: 5 out of 5 strength to flexion inspection of the upper and lower extremities bilaterally no gross sensory deficits in the upper or lower extremities bilaterally           DIAGNOSTIC STUDIES / PROCEDURES        RADIOLOGY  I have independently interpreted the diagnostic imaging associated with this visit and am waiting the final reading from the radiologist.   My preliminary interpretation is as follows: CT C-spine shows C1 arch fracture  Radiologist interpretation:   CT-CSPINE WITHOUT PLUS RECONS   Final Result      1.  Fracture of the RIGHT anterior arch of C1, acute versus subacute.   2.  Degenerative and postoperative change cervical spine again noted.   3.  Lucency seen adjacent the pedicle screws at C7 again seen, suggesting loosening.      These findings were electronically conveyed to and received by RAYMUNDO SERVIN on 1/11/2024 6:12 PM.          Laceration Repair Procedure    Indication: Laceration    Location/Description: forehead    Procedure: The patient was placed in the appropriate position and anesthesia around the laceration was obtained by infiltration using 0.5% Bupivacaine without epinephrine. The area was then irrigated with normal saline.  The wound was explored showing a 2 cm galeal laceration which was repaired with a single 5-0 Vicryl figure-of-eight suture.  The laceration was closed with 5-0 Ethilon using interrupted sutures.  Laceration repair complexity was complex.  There were no additional lacerations requiring repair. The wound area was then dressed with a sterile dressing.      Total repaired wound length: 6 cm.     Other Items: Suture count: 8    The patient tolerated the procedure well.    Complications: None      SPLINT PLACEMENT:  The patient was placed in a [place] J C collar  and the splint was applied by tech and nursing staff. Following splint application I rechecked the position and circulation and neuro function. The splint was in good position with good neurovascular function. The C-spine was well immobilized.She was counseled to f/u with neurosurgery in 1-2 weeks.    COURSE & MEDICAL DECISION MAKING    ED Observation Status? No; Patient does not meet criteria for ED Observation.     INITIAL ASSESSMENT, COURSE AND PLAN  Care Narrative: 58-year-old female presents after a ground-level mechanical fall with laceration to the forehead.  On exam she is well-appearing with normal vital signs she is neuro intact there is a large laceration to the forehead as well as some right-sided neck pain.  She did not have any midline pain however given her extensive neurosurgical C-spine history I decided to obtain a CT scan which showed a fracture of the anterior arch of C1.  I discussed the case with neurosurgery that recommended MJC and follow-up in clinic.  The patient was immobilized with an MJC and the laceration was repaired without difficulty.  I did consider obtaining a CT of the head however she was able to be ruled out with the [place] head CT rule so I felt that a scan would provide additional radiation without any benefit.  The patient was educated on wound care and advised to return to the ED in 7 to 10 days for suture removal.  Return precautions were also advised and outpatient follow-up and referral was placed with Dr. Mendoza  HTN/IDDM FOLLOW UP:  The patient is referred to a primary physician for blood pressure management, diabetic screening, and for all other preventive health concerns      ADDITIONAL PROBLEM LIST  Obesity- chronic    Chronic pain- follows with pain management    DISPOSITION AND DISCUSSIONS  I have discussed management of the patient with the following physicians and GREG's:  Dr Mendoza    Discussion of management with other [facility] or appropriate source(s): None      Escalation of care considered, and ultimately not performed:diagnostic imaging - considered head CT however pt [place] head CT rule negative    Barriers to care at this time, including but not limited to: Patient lacks transportation .     Decision tools and prescription drugs considered including, but not limited to:  [place] head ct rule negative .    FINAL DIAGNOSIS  1. Laceration of scalp, initial encounter Acute   2. Other closed nondisplaced fracture of first cervical vertebra, initial encounter ([facility]) Acute          Electronically signed by: Jason Robles M.D., 1/11/2024 7:44 PM

## 2024-01-12 NOTE — ED NOTES
Patient from lobby to [facility] 80 via wheelchair accompanied by ED Tech. Chart up for ERP.

## 2024-01-12 NOTE — ED NOTES
Patient discharged in stable condition per orders. Patient verbalized understanding of all discharge instructions. All belongings accounted for. Discharged via wheelchair with [place]-J collar in place accompanied by family.

## 2024-01-12 NOTE — ED TRIAGE NOTES
Pt to triage via w/c c/o trip and fall hitting her head on ground. Pt does not take blood thinners. Pt denies loc. Bleeding controlled.

## 2024-01-12 NOTE — PROGRESS NOTES
[place] J cervical collar applied to pt with ED technician assistance. Collar fits well and patient is tolerating this DME well at this time.     Contact traction for any questions or concerns regarding this brace.

## 2024-01-16 NOTE — LETTER
December 26, 2017        Re: Karine Ovalle    To whom it may concern,    My name is Nakia Goldsmith MD. I am taking care of Karine Ovalle, who is suffering acute illness that requires treatment. Please excuse Karine Ovalle from working duties on 12/22/17 and 12/26/2017.    If you have any questions, please do not hesitate to call my office.     Best regards,                 Nakia Goldsmith     DISPLAY PLAN FREE TEXT

## 2024-01-17 NOTE — DISCHARGE PLANNING
Patient calls today to ask when her sutures need to come out. This appears to not be present on her AVS. ERP notes states:    The patient was educated on wound care and advised to return to the ED in 7 to 10 days for suture removal.     Patient stated understanding. Patient cautioned on not waiting an extended period of time to return and the precautions.

## 2024-01-24 ENCOUNTER — HOSPITAL ENCOUNTER (OUTPATIENT)
Dept: RADIOLOGY | Facility: MEDICAL CENTER | Age: 59
End: 2024-01-24
Attending: NURSE PRACTITIONER
Payer: COMMERCIAL

## 2024-01-24 ENCOUNTER — HOSPITAL ENCOUNTER (EMERGENCY)
Facility: MEDICAL CENTER | Age: 59
End: 2024-01-24
Attending: EMERGENCY MEDICINE
Payer: COMMERCIAL

## 2024-01-24 VITALS
HEART RATE: 94 BPM | SYSTOLIC BLOOD PRESSURE: 113 MMHG | BODY MASS INDEX: 33.65 KG/M2 | RESPIRATION RATE: 18 BRPM | OXYGEN SATURATION: 96 % | WEIGHT: 197.09 LBS | HEIGHT: 64 IN | TEMPERATURE: 97.3 F | DIASTOLIC BLOOD PRESSURE: 74 MMHG

## 2024-01-24 DIAGNOSIS — Z51.89 ENCOUNTER FOR WOUND RE-CHECK: ICD-10-CM

## 2024-01-24 DIAGNOSIS — M79.671 PAIN IN RIGHT FOOT: ICD-10-CM

## 2024-01-24 DIAGNOSIS — Z48.02 VISIT FOR SUTURE REMOVAL: ICD-10-CM

## 2024-01-24 PROBLEM — M17.12 DEGENERATIVE ARTHRITIS OF LEFT KNEE: Status: ACTIVE | Noted: 2024-01-24

## 2024-01-24 PROCEDURE — 73630 X-RAY EXAM OF FOOT: CPT | Mod: RT

## 2024-01-24 PROCEDURE — 15853 REMOVAL SUTR/STAPL XREQ ANES: CPT

## 2024-01-24 PROCEDURE — 99284 EMERGENCY DEPT VISIT MOD MDM: CPT

## 2024-01-24 ASSESSMENT — FIBROSIS 4 INDEX: FIB4 SCORE: 1.32

## 2024-01-25 NOTE — DISCHARGE INSTRUCTIONS
Your laceration has healed well.  Anyway you have a cut you will have a scar.  To minimize scarring, keep this area out of the sun for the next 6 to 9 months, carefully wearing hats and sunscreen.

## 2024-01-25 NOTE — ED TRIAGE NOTES
"58 yr old female to triage  Chief Complaint   Patient presents with    Suture Removal     Patient report here from suture removals placed on January 11 at [facility].  Sutures placed on the forehead. Patient report of pain at the suture area. No redness noted.      /85   Pulse 94   Temp 36.3 °C (97.4 °F) (Temporal)   Resp 18   Ht 1.613 m (5' 3.5\")   Wt 89.4 kg (197 lb 1.5 oz)   LMP  (LMP Unknown)   SpO2 95%   BMI 34.37 kg/m²     "

## 2024-01-25 NOTE — ED PROVIDER NOTES
ER Provider Note    Scribed for Emanuel Ruth M.d. by Judi Anna. 1/24/2024  7:43 PM    Primary Care Provider: Micky Rubin M.D.    CHIEF COMPLAINT  Chief Complaint   Patient presents with    Suture Removal     Patient report here from suture removals placed on January 11 at [facility].  Sutures placed on the forehead. Patient report of pain at the suture area. No redness noted.      EXTERNAL RECORDS REVIEWED  Inpatient Notes Patient was seen on 1/11/24 for a ground level fall which caused a  subsequent laceration to her forehead. Reviewed laceration repair note from this visit, and 8 sutures were placed.     HPI/ROS  LIMITATION TO HISTORY   Select: : None    OUTSIDE HISTORIAN(S):  None    Karine Ovalle is a 58 y.o. female who presents to the ED for suture removal. Patient had sutures placed on 1/11/24 at [facility] following a ground level fall. Patient does not take a blood thinner. Per triage note she has some pain to the area of the laceration, but no redness, swelling, fever, or signs of infection at this time.    PAST MEDICAL HISTORY  Past Medical History:   Diagnosis Date    Administrative encounter- RTC ACCESS/ADA PARATRANSIT ELIGIBILITY 06/04/2019    Anemia     Anemia, chronic disease 06/02/2022    Arthritis     osteo/hips and back    At risk for falls     Back pain     Bronchitis     Cellulitis of right lower extremity 12/31/2021    Chickenpox     Chronic back pain     Cough     Dental disorder     lower denture    Edema 12/13/2018    Frequent headaches     History of gastric bypass 04/25/2012    Hoarseness, persistent     Hypokalemia 06/07/2018    Hyponatremia 05/18/2012    Left hip pain 09/18/2018    Leg edema 08/09/2021    Leukocytosis 04/03/2022    Microcytic anemia- postop THR 2/2019 12/13/2018    Mild intermittent asthma with acute exacerbation 04/25/2012    Morning headache     Multiple closed fractures of facial bone (HCC) 06/01/2022    Near syncope 06/01/2022    Obesity     Open wound of  "right lower extremity- needs wound vac 04/04/2022    Pain 12/04/2018    \"ALL OVER\", 10/10    Pain 02/04/2019    arthritic pain    Primary osteoarthritis of both hips- dr levine; left THR done feb 6, 2019; right THR 3/2018 06/07/2018    Rhinitis     S/P hip replacement, bilateral 02/13/2019    Sepsis (HCC) 04/03/2022    Serum gamma globulin increased 06/08/2022    Shortness of breath     Swelling of lower extremity     Tonsillitis     Toothache     Urinary incontinence     using pads    Wheezing        SURGICAL HISTORY  Past Surgical History:   Procedure Laterality Date    HIP ARTHROPLASTY TOTAL Left 02/13/2019    Procedure: HIP ARTHROPLASTY TOTAL, Cabling of intra-operative calcar fracture;  Surgeon: Bryon Levine M.D.;  Location: [facility];  Service: Orthopedics    CERVICAL FUSION POSTERIOR  12/07/2018    Procedure: CERVICAL FUSION POSTERIOR- STAGE #2 C3-5 AND C3-T1;  Surgeon: Emre Mendoza III, M.D.;  Location: [facility];  Service: Neurosurgery    CERVICAL LAMINECTOMY POSTERIOR  12/07/2018    Procedure: CERVICAL LAMINECTOMY POSTERIOR C2-T1;  Surgeon: Emre Mendoza III, M.D.;  Location: [facility];  Service: Neurosurgery    CERVICAL DISK AND FUSION ANTERIOR  12/05/2018    Procedure: CERVICAL DISK AND FUSION ANTERIOR-STAGE #1 C4-5;  Surgeon: Emre Mendoza III, M.D.;  Location: [facility];  Service: Neurosurgery    CORPECTOMY  12/05/2018    Procedure: CORPECTOMY;  Surgeon: Emre Mendoza III, M.D.;  Location: SURGERY [facility];  Service: Neurosurgery    HIP ARTHROPLASTY TOTAL Right 03/20/2018    Procedure: HIP ARTHROPLASTY TOTAL;  Surgeon: Bryon Levine M.D.;  Location: SURGERY [facility];  Service: Orthopedics    KNEE ARTHROPLASTY TOTAL Right 10/20/2015    Procedure: KNEE ARTHROPLASTY TOTAL;  Surgeon: Bryon Levine M.D.;  Location: [facility];  Service:     APPENDECTOMY LAPAROSCOPIC  03/21/2013    Performed by Dali Queen M.D. at " "SURGERY [facility] ORS    DEBRIDEMENT  05/17/2012    Performed by BRADLEY DYKES at SURGERY [facility]    PLASTIC SURGERY  2004    excess skin on arms and legs removed    MAMMOPLASTY AUGMENTATION Bilateral 2004    breast implants and \"tummy tuck\"    GASTRIC BYPASS LAPAROSCOPIC  2002    x 2    ABDOMINAL EXPLORATION      APPENDECTOMY      ARTHROSCOPY, KNEE      HIP REPLACEMENT, TOTAL      OTHER      breast augmentation 2004    OTHER      Gastric bypass 2002    PRIMARY C SECTION      SLEEVE,OSEI VASO THIGH      TONSILLECTOMY         FAMILY HISTORY  Family History   Problem Relation Age of Onset    Diabetes Mother     Heart Disease Mother        SOCIAL HISTORY   reports that she has never smoked. She has been exposed to tobacco smoke. She has never used smokeless tobacco. She reports current alcohol use of about 1.2 oz of alcohol per week. She reports that she does not use drugs.    CURRENT MEDICATIONS  Previous Medications    ALBUTEROL 108 (90 BASE) MCG/ACT AERO SOLN INHALATION AEROSOL    Inhale 2 Puffs every 6 hours as needed for Shortness of Breath.    ALBUTEROL 108 (90 BASE) MCG/ACT AERO SOLN INHALATION AEROSOL    Inhale 2 Puffs every four hours as needed for Shortness of Breath.    ASCORBIC ACID (VITAMIN C) 500 MG TABLET    Take 1 Tablet by mouth 2 times a day.    BECLOMETHASONE HFA (QVAR REDIHALER) 80 MCG/ACT INHALER    Inhale 2 Puffs 2 times a day.    BIOTIN 5000 MCG CAP    Take 1 Capsule by mouth every day. Indications: dietary supplement    CALCIUM CARBONATE-VITAMIN D PO    Take 1 Tablet by mouth every day. Indications: Low Amount of Calcium in the Blood    FERROUS SULFATE 325 (65 FE) MG TABLET    Take 1 Tab by mouth every morning with breakfast.    FLUTICASONE (FLOVENT HFA) 44 MCG/ACT AEROSOL    Inhale 2 Puffs 2 times a day as needed (tobacco smoke). Everyday maintenance steroid inhaler  Indications: Asthma    FLUTICASONE-SALMETEROL (ADVAIR) 250-50 MCG/ACT AEROSOL POWDER, BREATH ACTIVATED    Inhale " 1 Puff every 12 hours.    FUROSEMIDE (LASIX) 40 MG TAB    Take 1 Tablet by mouth 1 time a day as needed (leg edema). Patient reports only takes if leg edema increases. She doesn't use daily as prescribed.   Indications: Edema    GABAPENTIN (NEURONTIN) 800 MG TABLET    Take 1 Tablet by mouth 3 times a day. Indications: Neuropathic Pain    GABAPENTIN (NEURONTIN) 800 MG TABLET    Take 1 Tablet by mouth 3 times a day.    IPRATROPIUM-ALBUTEROL (DUONEB) 0.5-2.5 (3) MG/3ML NEBULIZER SOLUTION    Inhale 3 mL by nebulization every four hours as needed for Shortness of Breath.    METHOCARBAMOL (ROBAXIN) 500 MG TAB    Take 2 Tablets by mouth 4 times a day as needed (back pain and spasms). Indications: Musculoskeletal Pain    MORPHINE ER (MS CONTIN) 15 MG TAB CR TABLET    Take 15 mg by mouth every 12 hours. Indications: Pain    MORPHINE ER (MS CONTIN) 15 MG TAB CR TABLET    morphine ER 15 mg tablet,extended release   Take 1 tablet every 12 hours by oral route as directed for 30 days.   Do not drive, drink etoh while taking.    MORPHINE ER (MS CONTIN) 30 MG TAB CR TABLET    morphine ER 30 mg tablet,extended release   TAKE 1 TABLET BY MOUTH EVERY 12 HOURS FOR 3 DAYS    MULTIPLE VITAMIN (MULTIVITAMIN ADULT) TAB    Take 1 Tablet by mouth every day. Indications: Nutritional Support    NALOXONE (NARCAN) 4 MG/0.1ML LIQUID    naloxone 4 mg/actuation nasal spray   CALL 911. SPR CONTENTS OF ONE SPRAYER (0.1ML) INTO ONE NOSTRIL. REPEAT IN 2-3 MIN IF SYMPTOMS OF OPIOID EMERGENCY PERSIST, ALTERNATE NOSTRILS    OXYCODONE-ACETAMINOPHEN (PERCOCET-10)  MG TAB    Take 1 Tablet by mouth every 6 hours as needed for Moderate Pain or Severe Pain. Indications: breakthrough pain betweeen morphine administration    OXYCODONE-ACETAMINOPHEN (PERCOCET-10)  MG TAB    Take 1 Tablet by mouth every 6 hours.    PHENTERMINE 37.5 MG CAPSULE    Take 1 Capsule by mouth every morning for 90 days.    PHENTERMINE 37.5 MG CAPSULE    Take 1 Capsule by mouth  "every morning for 90 days.    POTASSIUM CHLORIDE ER (K-TAB) 20 MEQ TAB CR TABLET    Take 20 mEq by mouth 1 time a day as needed (Per patient, she takes with furosemide. ). Indications: takes with furosemide    VITAMIN D3 (CHOLECALCIFEROL) 1000 UNIT (25 MCG) TAB    Take 1,000 Units by mouth every day. Indications: Vitamin D Deficiency, dietary supplement    ZINC SULFATE (ZINCATE) 220 (50 ZN) MG CAP    Take 220 mg by mouth every day. Indications: Impaired Wound Healing, dietary supplement        ALLERGIES  Tobacco [nicotiana tabacum] and Other environmental    PHYSICAL EXAM  VITAL SIGNS: /85   Pulse 94   Temp 36.3 °C (97.4 °F) (Temporal)   Resp 18   Ht 1.613 m (5' 3.5\")   Wt 89.4 kg (197 lb 1.5 oz)   LMP  (LMP Unknown)   SpO2 95%   BMI 34.37 kg/m²   Pulse ox interpretation: I interpret this pulse ox as normal.  Constitutional: Alert in no apparent distress.  HENT: No signs of trauma, Bilateral external ears normal, Nose normal.   Eyes: Pupils are equal and reactive, Conjunctiva normal, Non-icteric.   Neck: Normal range of motion, Supple, No stridor.    Cardiovascular: Normal peripheral perfusion  Thorax & Lungs: Unlabored respirations, equal chest expansion, no accessory muscle use  Abdomen: Non-distended  Skin:  Well healed vertically oriented 6 cm scalp laceration. No erythema, No rash.   Back: Normal alignment and ROM  Extremities: No gross deformity  Musculoskeletal: Good range of motion in all major joints.   Neurologic: Alert, Normal motor function, No focal deficits noted.   Psychiatric: Affect normal, Judgment normal, Mood normal.    DIAGNOSTIC STUDIES  Suture/ Staple Removal Procedure Note    Indication: Wound healed    Procedure: The patient was placed in the appropriate position and the sutures were removed without difficulty.    Other items: Suture count: 8    The patient tolerated the procedure well.    Complications: None       COURSE & MEDICAL DECISION MAKING     ED Observation Status? No; " Patient does not meet criteria for ED Observation.     INITIAL ASSESSMENT, COURSE AND PLAN  Care Narrative:      7:43 PM Patient presents to the ED for suture removal. Patient evaluated at bedside. Suture removal performed at this time in triage room by me. Discussed plan for discharge; I advised the patient to follow-up with PCP as needed, and to return to the [facility] ED with any new or worsening symptoms. Patient was given the opportunity for questions. I addressed all questions or concerns at this time and they verbalize agreement to the plan of care.       ADDITIONAL PROBLEM LIST      The patient will return for new or worsening symptoms and is stable at the time of discharge.    The patient is referred to a primary physician for blood pressure management, diabetic screening, and for all other preventative health concerns.    DISPOSITION:  Patient will be discharged home in stable condition.    FOLLOW UP:  Micky Rubin M.D.  [address] [place] [address]   [address]  [place] [id number] [phone]      As needed      OUTPATIENT MEDICATIONS:  New Prescriptions    No medications on file         FINAL DIAGNOSIS  1. Visit for suture removal    2. Encounter for wound re-check      Suture removal performed by [facility]      Judi IRELAND (Ludy), am scribing for, and in the presence of, Emanuel Ruth M.D..    Electronically signed by: Judi Celaya), 1/24/2024    Emanuel IRELAND M.D. personally performed the services described in this documentation, as scribed by Judi Anna in my presence, and it is both accurate and complete.

## 2024-01-30 DIAGNOSIS — M54.41 CHRONIC BILATERAL LOW BACK PAIN WITH BILATERAL SCIATICA: ICD-10-CM

## 2024-01-30 DIAGNOSIS — M50.30 DDD (DEGENERATIVE DISC DISEASE), CERVICAL: ICD-10-CM

## 2024-01-30 DIAGNOSIS — M54.42 CHRONIC BILATERAL LOW BACK PAIN WITH BILATERAL SCIATICA: ICD-10-CM

## 2024-01-30 DIAGNOSIS — J45.40 MODERATE PERSISTENT EXTRINSIC ASTHMA WITHOUT COMPLICATION: ICD-10-CM

## 2024-01-30 DIAGNOSIS — G89.29 CHRONIC BILATERAL LOW BACK PAIN WITH BILATERAL SCIATICA: ICD-10-CM

## 2024-01-30 DIAGNOSIS — M51.36 DDD (DEGENERATIVE DISC DISEASE), LUMBAR: ICD-10-CM

## 2024-01-30 RX ORDER — METHOCARBAMOL 500 MG/1
TABLET, FILM COATED ORAL
Qty: 240 TABLET | Refills: 0 | Status: SHIPPED | OUTPATIENT
Start: 2024-01-30 | End: 2024-01-31 | Stop reason: SDUPTHER

## 2024-01-30 RX ORDER — ALBUTEROL SULFATE 90 UG/1
2 AEROSOL, METERED RESPIRATORY (INHALATION) EVERY 4 HOURS PRN
Qty: 9 G | Refills: 0 | Status: SHIPPED | OUTPATIENT
Start: 2024-01-30

## 2024-01-31 DIAGNOSIS — M54.41 CHRONIC BILATERAL LOW BACK PAIN WITH BILATERAL SCIATICA: ICD-10-CM

## 2024-01-31 DIAGNOSIS — M54.42 CHRONIC BILATERAL LOW BACK PAIN WITH BILATERAL SCIATICA: ICD-10-CM

## 2024-01-31 DIAGNOSIS — M51.36 DDD (DEGENERATIVE DISC DISEASE), LUMBAR: ICD-10-CM

## 2024-01-31 DIAGNOSIS — G89.29 CHRONIC BILATERAL LOW BACK PAIN WITH BILATERAL SCIATICA: ICD-10-CM

## 2024-01-31 DIAGNOSIS — M50.30 DDD (DEGENERATIVE DISC DISEASE), CERVICAL: ICD-10-CM

## 2024-01-31 RX ORDER — METHOCARBAMOL 500 MG/1
TABLET, FILM COATED ORAL
Qty: 240 TABLET | Refills: 0 | Status: SHIPPED | OUTPATIENT
Start: 2024-01-31

## 2024-01-31 NOTE — TELEPHONE ENCOUNTER
Received request via: Pharmacy    Was the patient seen in the last year in this department? Yes    Does the patient have an active prescription (recently filled or refills available) for medication(s) requested? No    Pharmacy Name: [facility]    Does the patient have detention Plus and need 100 day supply (blood pressure, diabetes and cholesterol meds only)? Patient does not have SCP

## 2024-02-06 DIAGNOSIS — T14.90XA WOUNDS AND INJURIES: ICD-10-CM

## 2024-02-07 NOTE — PROGRESS NOTES
Patient message my chart :   Dr. Rubin I am in need of a referral to Wound Care on [address].  I am hoping to get in this month to be treated for two small wounds on my legs.  I have a TKR SX. scheduled on 5/15/24 w/ Dr. Bryon Levine. And he does not want to perform surgery, if my small wounds are opened.     If you could please stressed the urgency to Wound Care of getting me to be seen asap.  So that I could be healed in time of my 5/15/24 surgery.     Thank you very much,     Karine Ovalle

## 2024-02-09 ENCOUNTER — NON-PROVIDER VISIT (OUTPATIENT)
Dept: WOUND CARE | Facility: MEDICAL CENTER | Age: 59
End: 2024-02-09
Attending: INTERNAL MEDICINE
Payer: COMMERCIAL

## 2024-02-13 ENCOUNTER — OFFICE VISIT (OUTPATIENT)
Dept: WOUND CARE | Facility: MEDICAL CENTER | Age: 59
End: 2024-02-13
Attending: INTERNAL MEDICINE
Payer: COMMERCIAL

## 2024-02-13 VITALS
TEMPERATURE: 97.6 F | SYSTOLIC BLOOD PRESSURE: 111 MMHG | RESPIRATION RATE: 20 BRPM | HEART RATE: 111 BPM | OXYGEN SATURATION: 96 % | DIASTOLIC BLOOD PRESSURE: 77 MMHG

## 2024-02-13 DIAGNOSIS — G89.4 CHRONIC PAIN SYNDROME: ICD-10-CM

## 2024-02-13 DIAGNOSIS — E66.9 OBESITY (BMI 30-39.9): ICD-10-CM

## 2024-02-13 DIAGNOSIS — S81.801A OPEN WOUND OF RIGHT LOWER EXTREMITY, INITIAL ENCOUNTER: ICD-10-CM

## 2024-02-13 DIAGNOSIS — I89.0 LYMPHEDEMA: ICD-10-CM

## 2024-02-13 DIAGNOSIS — S81.802A OPEN WOUND OF LEFT LOWER EXTREMITY, INITIAL ENCOUNTER: ICD-10-CM

## 2024-02-13 PROCEDURE — 11042 DBRDMT SUBQ TIS 1ST 20SQCM/<: CPT

## 2024-02-13 PROCEDURE — 3074F SYST BP LT 130 MM HG: CPT | Performed by: STUDENT IN AN ORGANIZED HEALTH CARE EDUCATION/TRAINING PROGRAM

## 2024-02-13 PROCEDURE — 11042 DBRDMT SUBQ TIS 1ST 20SQCM/<: CPT | Performed by: STUDENT IN AN ORGANIZED HEALTH CARE EDUCATION/TRAINING PROGRAM

## 2024-02-13 PROCEDURE — 99214 OFFICE O/P EST MOD 30 MIN: CPT | Mod: 25 | Performed by: STUDENT IN AN ORGANIZED HEALTH CARE EDUCATION/TRAINING PROGRAM

## 2024-02-13 PROCEDURE — 99214 OFFICE O/P EST MOD 30 MIN: CPT

## 2024-02-13 PROCEDURE — 3078F DIAST BP <80 MM HG: CPT | Performed by: STUDENT IN AN ORGANIZED HEALTH CARE EDUCATION/TRAINING PROGRAM

## 2024-02-13 ASSESSMENT — ENCOUNTER SYMPTOMS
CLAUDICATION: 0
FEVER: 0
CHILLS: 0
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
## 2024-02-13 NOTE — PATIENT INSTRUCTIONS
-Keep your wound dressing clean, dry, and intact.    -Change your dressing if it becomes soiled, soaked, or falls off.    -Remove your compression wrap if you have severe pain, severe swelling, numbness, color change, or temperature change in your toes. If you need to remove your compression wrap, do so by unrolling it. Do not cut the compression wrap off to prevent cutting yourself on accident.    -Should you experience any significant changes in your wound(s), such as infection (redness, swelling, localized heat, increased pain, fever > 101 F, chills) or have any questions regarding your home care instructions, please contact the wound center at [phone]. If after hours, contact your primary care physician or go to the hospital emergency room.

## 2024-02-13 NOTE — PROGRESS NOTES
2 layer compression wraps applied to bilateral lower extremities today. Refer to flow sheet for wound care details. Patient tolerated the procedure well.

## 2024-02-16 ENCOUNTER — APPOINTMENT (OUTPATIENT)
Dept: RADIOLOGY | Facility: MEDICAL CENTER | Age: 59
End: 2024-02-16
Attending: EMERGENCY MEDICINE
Payer: COMMERCIAL

## 2024-02-16 ENCOUNTER — HOSPITAL ENCOUNTER (EMERGENCY)
Facility: MEDICAL CENTER | Age: 59
End: 2024-02-16
Attending: EMERGENCY MEDICINE
Payer: COMMERCIAL

## 2024-02-16 VITALS
RESPIRATION RATE: 18 BRPM | BODY MASS INDEX: 33.29 KG/M2 | TEMPERATURE: 97.8 F | DIASTOLIC BLOOD PRESSURE: 64 MMHG | HEIGHT: 64 IN | HEART RATE: 111 BPM | OXYGEN SATURATION: 94 % | WEIGHT: 195 LBS | SYSTOLIC BLOOD PRESSURE: 113 MMHG

## 2024-02-16 DIAGNOSIS — R06.02 SHORTNESS OF BREATH: ICD-10-CM

## 2024-02-16 LAB
ALBUMIN SERPL BCP-MCNC: 3.4 G/DL (ref 3.2–4.9)
ALBUMIN/GLOB SERPL: 1.1 G/DL
ALP SERPL-CCNC: 136 U/L (ref 30–99)
ALT SERPL-CCNC: 17 U/L (ref 2–50)
ANION GAP SERPL CALC-SCNC: 13 MMOL/L (ref 7–16)
AST SERPL-CCNC: 26 U/L (ref 12–45)
BASOPHILS # BLD AUTO: 0.5 % (ref 0–1.8)
BASOPHILS # BLD: 0.04 K/UL (ref 0–0.12)
BILIRUB SERPL-MCNC: <0.2 MG/DL (ref 0.1–1.5)
BUN SERPL-MCNC: 13 MG/DL (ref 8–22)
CALCIUM ALBUM COR SERPL-MCNC: 9.1 MG/DL (ref 8.5–10.5)
CALCIUM SERPL-MCNC: 8.6 MG/DL (ref 8.4–10.2)
CHLORIDE SERPL-SCNC: 106 MMOL/L (ref 96–112)
CO2 SERPL-SCNC: 21 MMOL/L (ref 20–33)
CREAT SERPL-MCNC: 0.5 MG/DL (ref 0.5–1.4)
EKG IMPRESSION: NORMAL
EOSINOPHIL # BLD AUTO: 0.31 K/UL (ref 0–0.51)
EOSINOPHIL NFR BLD: 4.2 % (ref 0–6.9)
ERYTHROCYTE [DISTWIDTH] IN BLOOD BY AUTOMATED COUNT: 51.4 FL (ref 35.9–50)
GFR SERPLBLD CREATININE-BSD FMLA CKD-EPI: 108 ML/MIN/1.73 M 2
GLOBULIN SER CALC-MCNC: 3.1 G/DL (ref 1.9–3.5)
GLUCOSE SERPL-MCNC: 85 MG/DL (ref 65–99)
HCT VFR BLD AUTO: 41.4 % (ref 37–47)
HGB BLD-MCNC: 13.1 G/DL (ref 12–16)
IMM GRANULOCYTES # BLD AUTO: 0.09 K/UL (ref 0–0.11)
IMM GRANULOCYTES NFR BLD AUTO: 1.2 % (ref 0–0.9)
LYMPHOCYTES # BLD AUTO: 2.51 K/UL (ref 1–4.8)
LYMPHOCYTES NFR BLD: 33.8 % (ref 22–41)
MCH RBC QN AUTO: 29.2 PG (ref 27–33)
MCHC RBC AUTO-ENTMCNC: 31.6 G/DL (ref 32.2–35.5)
MCV RBC AUTO: 92.2 FL (ref 81.4–97.8)
MONOCYTES # BLD AUTO: 0.59 K/UL (ref 0–0.85)
MONOCYTES NFR BLD AUTO: 8 % (ref 0–13.4)
NEUTROPHILS # BLD AUTO: 3.88 K/UL (ref 1.82–7.42)
NEUTROPHILS NFR BLD: 52.3 % (ref 44–72)
NRBC # BLD AUTO: 0 K/UL
NRBC BLD-RTO: 0 /100 WBC (ref 0–0.2)
PLATELET # BLD AUTO: 309 K/UL (ref 164–446)
PMV BLD AUTO: 9.9 FL (ref 9–12.9)
POTASSIUM SERPL-SCNC: 4.2 MMOL/L (ref 3.6–5.5)
PROT SERPL-MCNC: 6.5 G/DL (ref 6–8.2)
RBC # BLD AUTO: 4.49 M/UL (ref 4.2–5.4)
SODIUM SERPL-SCNC: 140 MMOL/L (ref 135–145)
TROPONIN T SERPL-MCNC: 24 NG/L (ref 6–19)
TROPONIN T SERPL-MCNC: 26 NG/L (ref 6–19)
WBC # BLD AUTO: 7.4 K/UL (ref 4.8–10.8)

## 2024-02-16 PROCEDURE — 93005 ELECTROCARDIOGRAM TRACING: CPT | Performed by: EMERGENCY MEDICINE

## 2024-02-16 PROCEDURE — 700102 HCHG RX REV CODE 250 W/ 637 OVERRIDE(OP): Performed by: EMERGENCY MEDICINE

## 2024-02-16 PROCEDURE — 99285 EMERGENCY DEPT VISIT HI MDM: CPT

## 2024-02-16 PROCEDURE — 84484 ASSAY OF TROPONIN QUANT: CPT | Mod: 91

## 2024-02-16 PROCEDURE — 94760 N-INVAS EAR/PLS OXIMETRY 1: CPT

## 2024-02-16 PROCEDURE — 36415 COLL VENOUS BLD VENIPUNCTURE: CPT

## 2024-02-16 PROCEDURE — A9270 NON-COVERED ITEM OR SERVICE: HCPCS | Performed by: EMERGENCY MEDICINE

## 2024-02-16 PROCEDURE — 85025 COMPLETE CBC W/AUTO DIFF WBC: CPT

## 2024-02-16 PROCEDURE — 80053 COMPREHEN METABOLIC PANEL: CPT

## 2024-02-16 PROCEDURE — 71045 X-RAY EXAM CHEST 1 VIEW: CPT

## 2024-02-16 RX ORDER — OXYCODONE HYDROCHLORIDE AND ACETAMINOPHEN 5; 325 MG/1; MG/1
1 TABLET ORAL ONCE
Status: COMPLETED | OUTPATIENT
Start: 2024-02-16 | End: 2024-02-16

## 2024-02-16 RX ORDER — GABAPENTIN 400 MG/1
800 CAPSULE ORAL ONCE
Status: COMPLETED | OUTPATIENT
Start: 2024-02-16 | End: 2024-02-16

## 2024-02-16 RX ADMIN — OXYCODONE AND ACETAMINOPHEN 1 TABLET: 5; 325 TABLET ORAL at 15:53

## 2024-02-16 RX ADMIN — GABAPENTIN 800 MG: 400 CAPSULE ORAL at 15:53

## 2024-02-16 RX ADMIN — OXYCODONE AND ACETAMINOPHEN 1 TABLET: 5; 325 TABLET ORAL at 15:08

## 2024-02-16 ASSESSMENT — FIBROSIS 4 INDEX: FIB4 SCORE: 1.32

## 2024-02-16 NOTE — ED PROVIDER NOTES
"ER Provider Note    Scribed for Bobby De La Torre D.O. by Quan Lemon. 2/16/2024  10:59 AM    Primary Care Provider: Micky Rubin M.D.    CHIEF COMPLAINT  Chief Complaint   Patient presents with    Shortness of Breath     Pt BIB EMS due to SOB exasperated by neighbors smoking cigarettes. States she has come to the ED before in the past for this and got a breathing treatment and discharged and that helped her. Her inhaler at home \"is ot strong enough\".        HPI/ROS    Karine Ovalle is a 58 y.o. female who presents to the Emergency Department via EMS for evaluation of shortness of breath, onset this morning. The patient states she is allergic to cigarettes and is currently living in an apartment complex near neighbors who smoke cigarettes frequently. Today, her neighbors were smoking and this exacerbated her shortness of breath. She did not measure her oxygen saturation at this time, but the shortness of breath prompted her to call EMS. EMS administered a breathing treatment en route with moderate alleviation. The patient also reports associated symptoms of an intermittent cough, also due to her neighbors smoking. She denies any fevers, congestion, or chest pain. She has a history of similar episodes of shortness of breath, but denies any history of Asthma, Reactive Airway disease, or COPD. She is not followed by Pulmonology. She does follow with wound care for chronic lower extremity wounds.     ROS as per HPI.    PAST MEDICAL HISTORY  Past Medical History:   Diagnosis Date    Administrative encounter- RTC ACCESS/ADA PARATRANSIT ELIGIBILITY 06/04/2019    Anemia     Anemia, chronic disease 06/02/2022    Arthritis     osteo/hips and back    At risk for falls     Back pain     Bronchitis     Cellulitis of right lower extremity 12/31/2021    Chickenpox     Chronic back pain     Cough     Dental disorder     lower denture    Edema 12/13/2018    Frequent headaches     History of gastric bypass 04/25/2012    " "Hoarseness, persistent     Hypokalemia 06/07/2018    Hyponatremia 05/18/2012    Left hip pain 09/18/2018    Leg edema 08/09/2021    Leukocytosis 04/03/2022    Microcytic anemia- postop THR 2/2019 12/13/2018    Mild intermittent asthma with acute exacerbation 04/25/2012    Morning headache     Multiple closed fractures of facial bone (HCC) 06/01/2022    Near syncope 06/01/2022    Obesity     Open wound of right lower extremity- needs wound vac 04/04/2022    Pain 12/04/2018    \"ALL OVER\", 10/10    Pain 02/04/2019    arthritic pain    Primary osteoarthritis of both hips- dr levine; left THR done feb 6, 2019; right THR 3/2018 06/07/2018    Rhinitis     S/P hip replacement, bilateral 02/13/2019    Sepsis (HCC) 04/03/2022    Serum gamma globulin increased 06/08/2022    Shortness of breath     Swelling of lower extremity     Tonsillitis     Toothache     Urinary incontinence     using pads    Wheezing        SURGICAL HISTORY  Past Surgical History:   Procedure Laterality Date    HIP ARTHROPLASTY TOTAL Left 02/13/2019    Procedure: HIP ARTHROPLASTY TOTAL, Cabling of intra-operative calcar fracture;  Surgeon: Bryon Levine M.D.;  Location: [facility];  Service: Orthopedics    CERVICAL FUSION POSTERIOR  12/07/2018    Procedure: CERVICAL FUSION POSTERIOR- STAGE #2 C3-5 AND C3-T1;  Surgeon: Emre Mendoza III, M.D.;  Location: [facility];  Service: Neurosurgery    CERVICAL LAMINECTOMY POSTERIOR  12/07/2018    Procedure: CERVICAL LAMINECTOMY POSTERIOR C2-T1;  Surgeon: Emre Mendoza III, M.D.;  Location: [facility];  Service: Neurosurgery    CERVICAL DISK AND FUSION ANTERIOR  12/05/2018    Procedure: CERVICAL DISK AND FUSION ANTERIOR-STAGE #1 C4-5;  Surgeon: Emre Mendoza III, M.D.;  Location: SURGERY [facility];  Service: Neurosurgery    CORPECTOMY  12/05/2018    Procedure: CORPECTOMY;  Surgeon: Emre Mendoza III, M.D.;  Location: [facility];  Service: Neurosurgery    " "HIP ARTHROPLASTY TOTAL Right 03/20/2018    Procedure: HIP ARTHROPLASTY TOTAL;  Surgeon: Bryon Levine M.D.;  Location: SURGERY [facility];  Service: Orthopedics    KNEE ARTHROPLASTY TOTAL Right 10/20/2015    Procedure: KNEE ARTHROPLASTY TOTAL;  Surgeon: Bryon Levine M.D.;  Location: SURGERY [facility];  Service:     APPENDECTOMY LAPAROSCOPIC  03/21/2013    Performed by Dali Queen M.D. at SURGERY [facility]    DEBRIDEMENT  05/17/2012    Performed by BRADLEY DYKES at SURGERY [facility]    PLASTIC SURGERY  2004    excess skin on arms and legs removed    MAMMOPLASTY AUGMENTATION Bilateral 2004    breast implants and \"tummy tuck\"    GASTRIC BYPASS LAPAROSCOPIC  2002    x 2    ABDOMINAL EXPLORATION      APPENDECTOMY      ARTHROSCOPY, KNEE      HIP REPLACEMENT, TOTAL      OTHER      breast augmentation 2004    OTHER      Gastric bypass 2002    PRIMARY C SECTION      SLEEVE,OSEI VASO THIGH      TONSILLECTOMY         FAMILY HISTORY  Family History   Problem Relation Age of Onset    Diabetes Mother     Heart Disease Mother        SOCIAL HISTORY   reports that she has never smoked. She has been exposed to tobacco smoke. She has never used smokeless tobacco. She reports current alcohol use of about 1.2 oz of alcohol per week. She reports that she does not use drugs.    CURRENT MEDICATIONS  Previous Medications    ALBUTEROL 108 (90 BASE) MCG/ACT AERO SOLN INHALATION AEROSOL    Inhale 2 Puffs every 6 hours as needed for Shortness of Breath.    ALBUTEROL 108 (90 BASE) MCG/ACT AERO SOLN INHALATION AEROSOL    INHALE 2 PUFFS BY MOUTH EVERY 4 HOURS AS NEEDED FOR SHORTNESS OF BREATH.    ASCORBIC ACID (VITAMIN C) 500 MG TABLET    Take 1 Tablet by mouth 2 times a day.    BECLOMETHASONE HFA (QVAR REDIHALER) 80 MCG/ACT INHALER    Inhale 2 Puffs 2 times a day.    BIOTIN 5000 MCG CAP    Take 1 Capsule by mouth every day. Indications: dietary supplement    CALCIUM CARBONATE-VITAMIN D PO    Take 1 Tablet by " mouth every day. Indications: Low Amount of Calcium in the Blood    FERROUS SULFATE 325 (65 FE) MG TABLET    Take 1 Tab by mouth every morning with breakfast.    FLUTICASONE (FLOVENT HFA) 44 MCG/ACT AEROSOL    Inhale 2 Puffs 2 times a day as needed (tobacco smoke). Everyday maintenance steroid inhaler  Indications: Asthma    FLUTICASONE-SALMETEROL (ADVAIR) 250-50 MCG/ACT AEROSOL POWDER, BREATH ACTIVATED    Inhale 1 Puff every 12 hours.    FUROSEMIDE (LASIX) 40 MG TAB    Take 1 Tablet by mouth 1 time a day as needed (leg edema). Patient reports only takes if leg edema increases. She doesn't use daily as prescribed.   Indications: Edema    GABAPENTIN (NEURONTIN) 800 MG TABLET    Take 1 Tablet by mouth 3 times a day. Indications: Neuropathic Pain    GABAPENTIN (NEURONTIN) 800 MG TABLET    Take 1 Tablet by mouth 3 times a day.    IPRATROPIUM-ALBUTEROL (DUONEB) 0.5-2.5 (3) MG/3ML NEBULIZER SOLUTION    Inhale 3 mL by nebulization every four hours as needed for Shortness of Breath.    METHOCARBAMOL (ROBAXIN) 500 MG TAB    TAKE 2 TABLET BY MOUTH 4 TIMES DAILY AS NEEDED FOR  BACK  PAIN  AND  SPASMS    MORPHINE ER (MS CONTIN) 15 MG TAB CR TABLET    Take 15 mg by mouth every 12 hours. Indications: Pain    MORPHINE ER (MS CONTIN) 15 MG TAB CR TABLET    morphine ER 15 mg tablet,extended release   Take 1 tablet every 12 hours by oral route as directed for 30 days.   Do not drive, drink etoh while taking.    MORPHINE ER (MS CONTIN) 30 MG TAB CR TABLET    morphine ER 30 mg tablet,extended release   TAKE 1 TABLET BY MOUTH EVERY 12 HOURS FOR 3 DAYS    MULTIPLE VITAMIN (MULTIVITAMIN ADULT) TAB    Take 1 Tablet by mouth every day. Indications: Nutritional Support    NALOXONE (NARCAN) 4 MG/0.1ML LIQUID    naloxone 4 mg/actuation nasal spray   CALL 911. SPR CONTENTS OF ONE SPRAYER (0.1ML) INTO ONE NOSTRIL. REPEAT IN 2-3 MIN IF SYMPTOMS OF OPIOID EMERGENCY PERSIST, ALTERNATE NOSTRILS    OXYCODONE-ACETAMINOPHEN (PERCOCET-10)  MG TAB   "  Take 1 Tablet by mouth every 6 hours as needed for Moderate Pain or Severe Pain. Indications: breakthrough pain betweeen morphine administration    OXYCODONE-ACETAMINOPHEN (PERCOCET-10)  MG TAB    Take 1 Tablet by mouth every 6 hours.    POTASSIUM CHLORIDE ER (K-TAB) 20 MEQ TAB CR TABLET    Take 20 mEq by mouth 1 time a day as needed (Per patient, she takes with furosemide. ). Indications: takes with furosemide    VITAMIN D3 (CHOLECALCIFEROL) 1000 UNIT (25 MCG) TAB    Take 1,000 Units by mouth every day. Indications: Vitamin D Deficiency, dietary supplement    ZINC SULFATE (ZINCATE) 220 (50 ZN) MG CAP    Take 220 mg by mouth every day. Indications: Impaired Wound Healing, dietary supplement        ALLERGIES  Tobacco [nicotiana tabacum] and Other environmental    PHYSICAL EXAM  Ht 1.613 m (5' 3.5\")   Wt 88.5 kg (195 lb)   LMP  (LMP Unknown)   BMI 34.00 kg/m²     General: No acute distress.  HENT: Normocephalic, Mucus membranes are moist.   Chest: Lungs have even and unlabored respirations, Clear to auscultation.   Cardiovascular: Regular rate and regular rhythm, No peripheral cyanosis.  Abdomen: Non distended.  Skin: Coband wrapped bilateral lower extremities from the knees to the feet  Neuro: Awake, Conversive, Able to relay recent events.  Psychiatric: Calm and cooperative.     EXTERNAL RECORDS REVIEWED  Review of patient's past medical records show current treatment for lower extremity sores.     INITIAL ASSESSMENT  The patient complains of shortness of breath she relates it to her neighbors smoking. Her pulse oximeter is normal here, she has no respiratory distress and lungs are clear. Will evaluate for shortness of breath to exclude anemia, electrolyte imbalance, and cardiac injury    ED Observation Status? No; Patient does not meet criteria for ED Observation.     DIAGNOSTIC STUDIES    Labs:   Results for orders placed or performed during the hospital encounter of 02/16/24   CBC WITH DIFFERENTIAL "   Result Value Ref Range    WBC 7.4 4.8 - 10.8 K/uL    RBC 4.49 4.20 - 5.40 M/uL    Hemoglobin 13.1 12.0 - 16.0 g/dL    Hematocrit 41.4 37.0 - 47.0 %    MCV 92.2 81.4 - 97.8 fL    MCH 29.2 27.0 - 33.0 pg    MCHC 31.6 (L) 32.2 - 35.5 g/dL    RDW 51.4 (H) 35.9 - 50.0 fL    Platelet Count 309 164 - 446 K/uL    MPV 9.9 9.0 - 12.9 fL    Neutrophils-Polys 52.30 44.00 - 72.00 %    Lymphocytes 33.80 22.00 - 41.00 %    Monocytes 8.00 0.00 - 13.40 %    Eosinophils 4.20 0.00 - 6.90 %    Basophils 0.50 0.00 - 1.80 %    Immature Granulocytes 1.20 (H) 0.00 - 0.90 %    Nucleated RBC 0.00 0.00 - 0.20 /100 WBC    Neutrophils (Absolute) 3.88 1.82 - 7.42 K/uL    Lymphs (Absolute) 2.51 1.00 - 4.80 K/uL    Monos (Absolute) 0.59 0.00 - 0.85 K/uL    Eos (Absolute) 0.31 0.00 - 0.51 K/uL    Baso (Absolute) 0.04 0.00 - 0.12 K/uL    Immature Granulocytes (abs) 0.09 0.00 - 0.11 K/uL    NRBC (Absolute) 0.00 K/uL   COMP METABOLIC PANEL   Result Value Ref Range    Sodium 140 135 - 145 mmol/L    Potassium 4.2 3.6 - 5.5 mmol/L    Chloride 106 96 - 112 mmol/L    Co2 21 20 - 33 mmol/L    Anion Gap 13.0 7.0 - 16.0    Glucose 85 65 - 99 mg/dL    Bun 13 8 - 22 mg/dL    Creatinine 0.50 0.50 - 1.40 mg/dL    Calcium 8.6 8.4 - 10.2 mg/dL    Correct Calcium 9.1 8.5 - 10.5 mg/dL    AST(SGOT) 26 12 - 45 U/L    ALT(SGPT) 17 2 - 50 U/L    Alkaline Phosphatase 136 (H) 30 - 99 U/L    Total Bilirubin <0.2 0.1 - 1.5 mg/dL    Albumin 3.4 3.2 - 4.9 g/dL    Total Protein 6.5 6.0 - 8.2 g/dL    Globulin 3.1 1.9 - 3.5 g/dL    A-G Ratio 1.1 g/dL   TROPONIN   Result Value Ref Range    Troponin T 26 (H) 6 - 19 ng/L   ESTIMATED GFR   Result Value Ref Range    GFR (CKD-EPI) 108 >60 mL/min/1.73 m 2   TROPONIN   Result Value Ref Range    Troponin T 24 (H) 6 - 19 ng/L   EKG (NOW)   Result Value Ref Range    Report       [facility] Emergency Dept.    Test Date:  2024-02-16  Pt Name:    ANA LAWLER                  Department: [facility]  MRN:        [id number]                       Room:       -ROOM 7  Gender:     Female                       Technician: [id number]  :        1965                   Requested By:MATTHEW HORTA  Order #:    [id number]                    Reading MD:    Measurements  Intervals                                Axis  Rate:       104                          P:          48  DC:         162                          QRS:        -8  QRSD:       115                          T:          42  QT:         384  QTc:        506    Interpretive Statements  Sinus tachycardia  Nonspecific intraventricular conduction delay  Low voltage, extremity leads  Borderline ST depression, lateral leads  ST elevation, consider inferior injury  Compared to ECG 2023 10:59:38  Intraventricular conduction delay now present  Low QRS voltage now present  ST (T wave) deviation now  present  Myocardial infarct finding now present  Sinus rhythm no longer present       *Note: Due to a large number of results and/or encounters for the requested time period, some results have not been displayed. A complete set of results can be found in Results Review.        EKG:   I have independently interpreted the above EKG.    Radiology:   The attending emergency physician has independently interpreted the diagnostic imaging associated with this visit and am waiting the final reading from the radiologist.   Preliminary interpretation is as follows: No acute disease chest x-ray  Radiologist interpretation:   DX-CHEST-PORTABLE (1 VIEW)   Final Result         1. No acute cardiopulmonary abnormalities are identified.           COURSE & MEDICAL DECISION MAKING     COURSE AND PLAN  10:59 AM - Patient seen and examined at bedside. Discussed plan of care, including diagnostic testing for further evaluation. Patient agrees to the plan of care. Ordered for EKG, DX-Chest, CMP, Troponin, and CC with differential to evaluate her symptoms.    2:49 PM Ordered Troponin for further evaluation, Patient  will be medicated with Percocet 5/325 mg one tablet, this is her home dose for chronic pain.     3:12 PM Patient was reevaluated at bedside. She will be medicated with Neurontin capsule 800 mg.     ED Summary: Patient presents with complaints of shortness of breath.  She says this happens when she is exposed to cigarette smoke.  She has neighbors who are smoking in her apartment she believes is the cause of her shortness of breath.  She has been here before for this she had nebulized treatments and was discharged home.    Chest x-ray here is negative.  Because of shortness of breath there is concerns for cardiac disease her troponin is mildly elevated and repeat troponin is lower.  There is no signs of cardiac injury.  And her pulse oximetry has been normal, she has had no respiratory distress.  She does have history of nonhealing wounds of the lower extremity.  There is no signs of sepsis.  At this time she is stable for discharge home.      DISPOSITION AND DISCUSSIONS    The patient will return for new or worsening symptoms and is stable at the time of discharge.    The patient is referred to a primary physician for blood pressure management, diabetic screening, and for all other preventative health concerns.    DISPOSITION:  Patient will be discharged home in stable condition.    FOLLOW UP:  No follow-up provider specified.    OUTPATIENT MEDICATIONS:  New Prescriptions    No medications on file         FINAL DIAGNOSIS  No diagnosis found.    Quan IRELAND (Ludy), am scribing for, and in the presence of, Bobby De La Torre D.O..    Electronically signed by: Quan Lemon (Ludy), 2/16/2024    Bobby IRELAND D.O. personally performed the services described in this documentation, as scribed by Quan Lemon in my presence, and it is both accurate and complete.     The note accurately reflects work and decisions made by me.  Bobby De La Torre D.O.  2/16/2024  4:40 PM

## 2024-02-16 NOTE — ED TRIAGE NOTES
"Chief Complaint   Patient presents with    Shortness of Breath     Pt BIB EMS due to SOB exasperated by neighbors smoking cigarettes. States she has come to the ED before in the past for this and got a breathing treatment and discharged and that helped her. Her inhaler at home \"is ot strong enough\".        "

## 2024-02-17 NOTE — DISCHARGE INSTRUCTIONS
The repeat of the blood test was normal.  There is no concerns of a heart injury.  Your chest x-ray is normal.  Your oxygen levels are good.  I can find no specific cause for the episode of shortness of breath that you are experiencing, please follow-up with your primary care physician.

## 2024-02-20 ENCOUNTER — NON-PROVIDER VISIT (OUTPATIENT)
Dept: WOUND CARE | Facility: MEDICAL CENTER | Age: 59
End: 2024-02-20
Attending: INTERNAL MEDICINE
Payer: COMMERCIAL

## 2024-02-20 PROCEDURE — 97597 DBRDMT OPN WND 1ST 20 CM/<: CPT

## 2024-02-20 NOTE — PROGRESS NOTES
2% viscous lidocaine applied topically to wound bed for approximately 5 minutes prior to debridement. CSWD with curette to debride wound bed and periwound callus of non-viable tissue. Entire surface of wound, < 20 cm2 debrided. 2 layer compression wrap applied to bilateral lower extremities. Refer to flow sheet for wound care details. Patient tolerated the procedure well.     O-Z Flap Text: The defect edges were debeveled with a #15 scalpel blade.  Given the location of the defect, shape of the defect and the proximity to free margins an O-Z flap was deemed most appropriate.  Using a sterile surgical marker, an appropriate transposition flap was drawn incorporating the defect and placing the expected incisions within the relaxed skin tension lines where possible. The area thus outlined was incised deep to adipose tissue with a #15 scalpel blade.  The skin margins were undermined to an appropriate distance in all directions utilizing iris scissors.

## 2024-02-20 NOTE — PATIENT INSTRUCTIONS
-Keep your wound dressing clean, dry, and intact.    -Change your dressing if it becomes soiled, soaked, or falls off.    -Should you experience any significant changes in your wound(s), such as infection (redness, swelling, localized heat, increased pain, fever > 101 F, chills) or have any questions regarding your home care instructions, please contact the wound center at [phone]. If after hours, contact your primary care physician or go to the hospital emergency room.

## 2024-02-21 NOTE — DISCHARGE PLANNING
Anticipated Discharge Disposition: Home    Action: Call to ER CM VM for work not. One already entered, Called pt and advised    Barriers to Discharge: None    Plan: No further ER CM needs

## 2024-02-25 ENCOUNTER — OFFICE VISIT (OUTPATIENT)
Dept: URGENT CARE | Facility: CLINIC | Age: 59
End: 2024-02-25
Payer: COMMERCIAL

## 2024-02-25 VITALS
OXYGEN SATURATION: 95 % | WEIGHT: 197 LBS | DIASTOLIC BLOOD PRESSURE: 72 MMHG | HEART RATE: 89 BPM | RESPIRATION RATE: 20 BRPM | BODY MASS INDEX: 33.63 KG/M2 | SYSTOLIC BLOOD PRESSURE: 128 MMHG | HEIGHT: 64 IN | TEMPERATURE: 97.9 F

## 2024-02-25 DIAGNOSIS — U07.1 COVID-19 VIRUS INFECTION: ICD-10-CM

## 2024-02-25 DIAGNOSIS — R19.7 DIARRHEA, UNSPECIFIED TYPE: ICD-10-CM

## 2024-02-25 LAB
FLUAV RNA SPEC QL NAA+PROBE: NEGATIVE
FLUBV RNA SPEC QL NAA+PROBE: NEGATIVE
RSV RNA SPEC QL NAA+PROBE: NEGATIVE
SARS-COV-2 RNA RESP QL NAA+PROBE: POSITIVE

## 2024-02-25 PROCEDURE — 0241U POCT CEPHEID COV-2, FLU A/B, RSV - PCR: CPT | Performed by: PHYSICIAN ASSISTANT

## 2024-02-25 PROCEDURE — 3074F SYST BP LT 130 MM HG: CPT | Performed by: PHYSICIAN ASSISTANT

## 2024-02-25 PROCEDURE — 99213 OFFICE O/P EST LOW 20 MIN: CPT | Performed by: PHYSICIAN ASSISTANT

## 2024-02-25 PROCEDURE — 3078F DIAST BP <80 MM HG: CPT | Performed by: PHYSICIAN ASSISTANT

## 2024-02-25 RX ORDER — PREDNISONE 10 MG/1
30 TABLET ORAL DAILY
Qty: 15 TABLET | Refills: 0 | Status: SHIPPED | OUTPATIENT
Start: 2024-02-25 | End: 2024-03-01

## 2024-02-25 ASSESSMENT — ENCOUNTER SYMPTOMS
DIARRHEA: 1
EYE DISCHARGE: 0
VOMITING: 0
CHILLS: 1
CONSTIPATION: 0
SINUS PAIN: 0
SORE THROAT: 0
EYE REDNESS: 0
HEADACHES: 0
DIAPHORESIS: 0
NAUSEA: 0
WHEEZING: 0
FEVER: 1
ABDOMINAL PAIN: 0
SHORTNESS OF BREATH: 0
EYE PAIN: 0
COUGH: 0
DIZZINESS: 0

## 2024-02-25 ASSESSMENT — FIBROSIS 4 INDEX: FIB4 SCORE: 1.18

## 2024-02-25 NOTE — LETTER
[facility] [place] [id number] [facility] [id number]     February 25, 2024    Patient: Karine Ovalle   YOB: 1965   Date of Visit: 2/25/2024       To Whom It May Concern:    Karine Ovalle was seen and treated in our department on 2/25/2024.  Please excuse from work on 2/22/2024 - 2/25/2024, can return back on 2/26/2024    Sincerely,     Amado Sharif P.A.-C.

## 2024-02-26 ENCOUNTER — OFFICE VISIT (OUTPATIENT)
Dept: URGENT CARE | Facility: CLINIC | Age: 59
End: 2024-02-26
Payer: COMMERCIAL

## 2024-02-26 VITALS
RESPIRATION RATE: 12 BRPM | DIASTOLIC BLOOD PRESSURE: 88 MMHG | OXYGEN SATURATION: 94 % | SYSTOLIC BLOOD PRESSURE: 122 MMHG | HEIGHT: 63 IN | TEMPERATURE: 97.1 F | WEIGHT: 197 LBS | HEART RATE: 108 BPM | BODY MASS INDEX: 34.91 KG/M2

## 2024-02-26 DIAGNOSIS — U07.1 COVID: ICD-10-CM

## 2024-02-26 PROCEDURE — 99213 OFFICE O/P EST LOW 20 MIN: CPT | Performed by: NURSE PRACTITIONER

## 2024-02-26 PROCEDURE — 3079F DIAST BP 80-89 MM HG: CPT | Performed by: NURSE PRACTITIONER

## 2024-02-26 PROCEDURE — 3074F SYST BP LT 130 MM HG: CPT | Performed by: NURSE PRACTITIONER

## 2024-02-26 ASSESSMENT — FIBROSIS 4 INDEX: FIB4 SCORE: 1.18

## 2024-02-26 NOTE — LETTER
February 26, 2024         Patient: Karine Ovalle   YOB: 1965   Date of Visit: 2/26/2024           To Whom it May Concern:    Karine Ovalle was seen in my clinic on 2/26/2024. She may return to work on 2/27/24.    If you have any questions or concerns, please don't hesitate to call.        Sincerely,           ADITYA Ontiveros  Electronically Signed

## 2024-02-26 NOTE — LETTER
February 26, 2024         Patient: Karine Ovalle   YOB: 1965   Date of Visit: 2/26/2024           To Whom it May Concern:    Karine Ovalle was seen in my clinic on 2/26/2024. She may return to work on 2/29/24.    If you have any questions or concerns, please don't hesitate to call.        Sincerely,           ADITYA Ontiveros  Electronically Signed

## 2024-02-26 NOTE — PROGRESS NOTES
Subjective:     Karine Ovalle  is a 58 y.o. female who presents for Diarrhea (Patient states that symptoms started last Tuesday. ), Fever, Chills, and Nasal Congestion       She presents today with fevers, chills and nasal congestion has been ongoing for last 6 days.  Has associated diarrhea, this has been well-managed with Imodium.  She does work from home and denies any recent known close sick contacts.  She does have a history of asthma and has been experiencing episodes of mild wheezing, denies any chest pain or shortness of breath and no wheezing at this time.  No nausea or vomiting.  Has been using over-the-counter medications for symptoms.       Review of Systems   Constitutional:  Positive for chills and fever. Negative for diaphoresis and malaise/fatigue.   HENT:  Positive for congestion. Negative for ear discharge, sinus pain and sore throat.    Eyes:  Negative for pain, discharge and redness.   Respiratory:  Negative for cough, shortness of breath and wheezing.    Cardiovascular:  Negative for chest pain.   Gastrointestinal:  Positive for diarrhea. Negative for abdominal pain, constipation, nausea and vomiting.   Neurological:  Negative for dizziness and headaches.      Allergies   Allergen Reactions    Tobacco [Nicotiana Tabacum] Shortness of Breath     Cigarette smoke causes SOB, rispatory issues    Other Environmental      Cigarette smoke  Other reaction(s): Not available     Past Medical History:   Diagnosis Date    Administrative encounter- RTC ACCESS/ADA PARATRANSIT ELIGIBILITY 06/04/2019    Anemia     Anemia, chronic disease 06/02/2022    Arthritis     osteo/hips and back    At risk for falls     Back pain     Bronchitis     Cellulitis of right lower extremity 12/31/2021    Chickenpox     Chronic back pain     Cough     Dental disorder     lower denture    Edema 12/13/2018    Frequent headaches     History of gastric bypass 04/25/2012    Hoarseness, persistent     Hypokalemia 06/07/2018     "Hyponatremia 05/18/2012    Left hip pain 09/18/2018    Leg edema 08/09/2021    Leukocytosis 04/03/2022    Microcytic anemia- postop THR 2/2019 12/13/2018    Mild intermittent asthma with acute exacerbation 04/25/2012    Morning headache     Multiple closed fractures of facial bone (HCC) 06/01/2022    Near syncope 06/01/2022    Obesity     Open wound of right lower extremity- needs wound vac 04/04/2022    Pain 12/04/2018    \"ALL OVER\", 10/10    Pain 02/04/2019    arthritic pain    Primary osteoarthritis of both hips- dr nowak; left THR done feb 6, 2019; right THR 3/2018 06/07/2018    Rhinitis     S/P hip replacement, bilateral 02/13/2019    Sepsis (HCC) 04/03/2022    Serum gamma globulin increased 06/08/2022    Shortness of breath     Swelling of lower extremity     Tonsillitis     Toothache     Urinary incontinence     using pads    Wheezing         Objective:   /72 (BP Location: Left arm, Patient Position: Sitting, BP Cuff Size: Large adult)   Pulse 89   Temp 36.6 °C (97.9 °F) (Temporal)   Resp 20   Ht 1.613 m (5' 3.5\")   Wt 89.4 kg (197 lb)   LMP  (LMP Unknown)   SpO2 95%   BMI 34.35 kg/m²   Physical Exam  Vitals and nursing note reviewed.   Constitutional:       General: She is not in acute distress.     Appearance: Normal appearance. She is not ill-appearing, toxic-appearing or diaphoretic.   HENT:      Head: Normocephalic.      Right Ear: Tympanic membrane, ear canal and external ear normal. There is no impacted cerumen.      Left Ear: Tympanic membrane, ear canal and external ear normal. There is no impacted cerumen.      Nose: Congestion present. No rhinorrhea.      Mouth/Throat:      Mouth: Mucous membranes are moist.      Pharynx: No oropharyngeal exudate or posterior oropharyngeal erythema.   Eyes:      General:         Right eye: No discharge.         Left eye: No discharge.      Conjunctiva/sclera: Conjunctivae normal.   Cardiovascular:      Rate and Rhythm: Normal rate and regular rhythm. "   Pulmonary:      Effort: Pulmonary effort is normal. No respiratory distress.      Breath sounds: Normal breath sounds. No stridor. No wheezing or rhonchi.   Musculoskeletal:      Cervical back: Neck supple.   Lymphadenopathy:      Cervical: No cervical adenopathy.   Neurological:      General: No focal deficit present.      Mental Status: She is alert and oriented to person, place, and time.   Psychiatric:         Mood and Affect: Mood normal.         Behavior: Behavior normal.         Thought Content: Thought content normal.         Judgment: Judgment normal.             Diagnostic testing:    Cephid COVID/Influenza/RSV -positive COVID, all others negative, notified via zoidu message    Assessment/Plan:     Encounter Diagnoses   Name Primary?    COVID-19 virus infection     Diarrhea, unspecified type           Plan for care for today's complaint includes starting the patient on prednisone for her COVID-19 infections at this time.  Symptoms have been ongoing over the last 6 days so patient does not qualify for COVID antiviral medications at this time.  Vital signs were stable and overall patient was well-appearing during today's examination.  Continue to use over-the-counter antidiarrheal medications as needed.  Continue to use over-the-counter medications for additional symptom support for her COVID-19 virus symptoms.  Continue to monitor symptoms and return to urgent care or follow-up with primary care provider if symptoms remain ongoing.  Follow-up in the emergency department if symptoms become severe, ER precautions discussed in office today..  Prescription for prednisone provided.    See AVS Instructions below for written guidance provided to patient on after-visit management and care in addition to our verbal discussion during the visit.    Please note that this dictation was created using voice recognition software. I have attempted to correct all errors, but there may be sound-alike, spelling, grammar  and possibly content errors that I did not discover before finalizing the note.    Boundary Chante BRANDON

## 2024-02-27 ENCOUNTER — OFFICE VISIT (OUTPATIENT)
Dept: WOUND CARE | Facility: MEDICAL CENTER | Age: 59
End: 2024-02-27
Attending: INTERNAL MEDICINE
Payer: COMMERCIAL

## 2024-02-27 VITALS
HEART RATE: 104 BPM | SYSTOLIC BLOOD PRESSURE: 105 MMHG | RESPIRATION RATE: 20 BRPM | OXYGEN SATURATION: 98 % | TEMPERATURE: 96.8 F | DIASTOLIC BLOOD PRESSURE: 64 MMHG

## 2024-02-27 DIAGNOSIS — M25.571 PAIN IN JOINT OF RIGHT FOOT: ICD-10-CM

## 2024-02-27 DIAGNOSIS — E66.9 OBESITY (BMI 30-39.9): ICD-10-CM

## 2024-02-27 DIAGNOSIS — S81.801A OPEN WOUND OF RIGHT LOWER EXTREMITY, INITIAL ENCOUNTER: ICD-10-CM

## 2024-02-27 DIAGNOSIS — S81.802A OPEN WOUND OF LEFT LOWER EXTREMITY, INITIAL ENCOUNTER: ICD-10-CM

## 2024-02-27 DIAGNOSIS — G89.4 CHRONIC PAIN SYNDROME: ICD-10-CM

## 2024-02-27 DIAGNOSIS — I89.0 LYMPHEDEMA: ICD-10-CM

## 2024-02-27 PROCEDURE — 99213 OFFICE O/P EST LOW 20 MIN: CPT

## 2024-02-27 PROCEDURE — 11042 DBRDMT SUBQ TIS 1ST 20SQCM/<: CPT | Performed by: NURSE PRACTITIONER

## 2024-02-27 PROCEDURE — 3074F SYST BP LT 130 MM HG: CPT | Performed by: NURSE PRACTITIONER

## 2024-02-27 PROCEDURE — 11042 DBRDMT SUBQ TIS 1ST 20SQCM/<: CPT

## 2024-02-27 PROCEDURE — 3078F DIAST BP <80 MM HG: CPT | Performed by: NURSE PRACTITIONER

## 2024-02-27 PROCEDURE — 99213 OFFICE O/P EST LOW 20 MIN: CPT | Mod: 25 | Performed by: NURSE PRACTITIONER

## 2024-02-27 ASSESSMENT — ENCOUNTER SYMPTOMS
FEVER: 0
HEADACHES: 1
COUGH: 1
ABDOMINAL PAIN: 0
RHINORRHEA: 1
VOMITING: 0
DIARRHEA: 1
CHILLS: 0

## 2024-02-27 NOTE — PROGRESS NOTES
Subjective:   Karine Ovalle is a 58 y.o. female who presents for Fever and Headache      URI   This is a recurrent problem. Episode onset: 2 weeks; tested positive for covid yesterday. The problem has been unchanged. Associated symptoms include congestion, coughing, diarrhea, headaches and rhinorrhea. Pertinent negatives include no abdominal pain or vomiting. She has tried sleep, acetaminophen and increased fluids for the symptoms.       Review of Systems   Constitutional:  Positive for malaise/fatigue. Negative for chills and fever.   HENT:  Positive for congestion and rhinorrhea.    Respiratory:  Positive for cough.    Gastrointestinal:  Positive for diarrhea. Negative for abdominal pain and vomiting.   Neurological:  Positive for headaches.       Medications:    albuterol Aers  ascorbic acid  Biotin Caps  CALCIUM CARBONATE-VITAMIN D PO  ferrous sulfate  fluticasone Aero  fluticasone-salmeterol Aepb  furosemide Tabs  gabapentin  ipratropium-albuterol  methocarbamol Tabs  morphine ER Tbcr  Multivitamin Adult Tabs  Naloxone Liqd  oxyCODONE-acetaminophen Tabs  potassium Chloride ER Tbcr  predniSONE Tabs  Qvar RediHaler Aerb  vitamin D3 Tabs  zinc sulfate Caps    Allergies: Tobacco [nicotiana tabacum] and Other environmental    Problem List: Karine Ovalle does not have any pertinent problems on file.    Surgical History:  Past Surgical History:   Procedure Laterality Date    HIP ARTHROPLASTY TOTAL Left 02/13/2019    Procedure: HIP ARTHROPLASTY TOTAL, Cabling of intra-operative calcar fracture;  Surgeon: Bryon Levine M.D.;  Location: SURGERY [facility];  Service: Orthopedics    CERVICAL FUSION POSTERIOR  12/07/2018    Procedure: CERVICAL FUSION POSTERIOR- STAGE #2 C3-5 AND C3-T1;  Surgeon: Emre Mendoza III, M.D.;  Location: SURGERY [facility];  Service: Neurosurgery    CERVICAL LAMINECTOMY POSTERIOR  12/07/2018    Procedure: CERVICAL LAMINECTOMY POSTERIOR C2-T1;  Surgeon: Emre Mendoza III  "M.D.;  Location: SURGERY [facility];  Service: Neurosurgery    CERVICAL DISK AND FUSION ANTERIOR  12/05/2018    Procedure: CERVICAL DISK AND FUSION ANTERIOR-STAGE #1 C4-5;  Surgeon: Emre Mendoza III, M.D.;  Location: SURGERY [facility];  Service: Neurosurgery    CORPECTOMY  12/05/2018    Procedure: CORPECTOMY;  Surgeon: Emre Mendoza III, M.D.;  Location: SURGERY [facility];  Service: Neurosurgery    HIP ARTHROPLASTY TOTAL Right 03/20/2018    Procedure: HIP ARTHROPLASTY TOTAL;  Surgeon: Bryon Levine M.D.;  Location: SURGERY [facility];  Service: Orthopedics    KNEE ARTHROPLASTY TOTAL Right 10/20/2015    Procedure: KNEE ARTHROPLASTY TOTAL;  Surgeon: Bryon Levine M.D.;  Location: [facility];  Service:     APPENDECTOMY LAPAROSCOPIC  03/21/2013    Performed by Dali Queen M.D. at [facility]    DEBRIDEMENT  05/17/2012    Performed by BRADLEY DYKES at SURGERY [facility]    PLASTIC SURGERY  2004    excess skin on arms and legs removed    MAMMOPLASTY AUGMENTATION Bilateral 2004    breast implants and \"tummy tuck\"    GASTRIC BYPASS LAPAROSCOPIC  2002    x 2    ABDOMINAL EXPLORATION      APPENDECTOMY      ARTHROSCOPY, KNEE      HIP REPLACEMENT, TOTAL      OTHER      breast augmentation 2004    OTHER      Gastric bypass 2002    PRIMARY C SECTION      SLEEVE,OSEI VASO THIGH      TONSILLECTOMY         Past Social Hx: Karine Ovalle  reports that she has never smoked. She has been exposed to tobacco smoke. She has never used smokeless tobacco. She reports current alcohol use of about 1.2 oz of alcohol per week. She reports that she does not use drugs.     Past Family Hx:  Karine Ovalle family history includes Diabetes in her mother; Heart Disease in her mother.     Problem list, medications, and allergies reviewed by myself today in Epic.     Objective:     /88 (BP Location: Right arm, Patient Position: Sitting, BP Cuff Size: Adult)   Pulse (!) " "108   Temp 36.2 °C (97.1 °F)   Resp 12   Ht 1.6 m (5' 3\")   Wt 89.4 kg (197 lb)   LMP  (LMP Unknown)   SpO2 94%   BMI 34.90 kg/m²     Physical Exam  Constitutional:       General: She is not in acute distress.     Appearance: She is well-developed.   HENT:      Head: Normocephalic and atraumatic.      Right Ear: Tympanic membrane normal.      Left Ear: Tympanic membrane normal.      Nose: Nose normal.      Right Sinus: No maxillary sinus tenderness or frontal sinus tenderness.      Left Sinus: No maxillary sinus tenderness or frontal sinus tenderness.      Mouth/Throat:      Pharynx: Uvula midline. No posterior oropharyngeal erythema.      Tonsils: No tonsillar exudate or tonsillar abscesses.   Eyes:      General:         Right eye: No discharge.         Left eye: No discharge.      Conjunctiva/sclera: Conjunctivae normal.      Pupils: Pupils are equal, round, and reactive to light.   Cardiovascular:      Rate and Rhythm: Normal rate and regular rhythm.      Heart sounds: No murmur heard.  Pulmonary:      Effort: Pulmonary effort is normal. No respiratory distress.      Breath sounds: Normal breath sounds.   Abdominal:      General: There is no distension.      Palpations: Abdomen is soft.      Tenderness: There is no abdominal tenderness.   Skin:     General: Skin is warm and dry.   Neurological:      Mental Status: She is alert and oriented to person, place, and time.      Sensory: No sensory deficit.      Deep Tendon Reflexes: Reflexes are normal and symmetric.   Psychiatric:         Mood and Affect: Mood is anxious.         Assessment/Plan:     Diagnosis and associated orders:     1. COVID             Comments/MDM:     I personally reviewed prior external notes and prior test results pertinent to today's visit.  Patient tested positive COVID yesterday.  Education provided for patient symptoms have been present greater than 5 days no treatment necessary recommended supportive care.  Discussed management " options, risks and benefits, and alternatives to treatment plan agreed upon.   Red flags discussed and indications to immediately call 911 or present to the Emergency Department.   Supportive care, differential diagnoses, and indications for immediate follow-up discussed with patient.    Patient expresses understanding and agrees to plan. Patient denies any other questions or concerns.            My total time spent caring for the patient on the day of the encounter was 22 minutes.   This does not include time spent on separately billable procedures/tests.      Please note that this dictation was created using voice recognition software. I have made a reasonable attempt to correct obvious errors, but I expect that there are errors of grammar and possibly content that I did not discover before finalizing the note.    This note was electronically signed by Sorin COREY.
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
## 2024-02-28 NOTE — PATIENT INSTRUCTIONS
-Keep your wound dressing clean, dry, and intact. Only change dressing if it's over saturated, soiled or falls off.     -Change your dressing if it becomes soiled, soaked, or falls off.    -Remove your compression stockings if you have severe pain, severe swelling, numbness, color change, or temperature change in your toes. If you need to remove your compression wrap, do so by unrolling it. Do not cut the compression wrap off to prevent cutting yourself on accident.    -Should you experience any significant changes in your wound(s), such as infection (redness, swelling, localized heat, increased pain, fever > 101 F, chills) or have any questions regarding your home care instructions, please contact the wound center at [phone]. If after hours, contact your primary care physician or go to the hospital emergency room.

## 2024-03-05 ENCOUNTER — OFFICE VISIT (OUTPATIENT)
Dept: WOUND CARE | Facility: MEDICAL CENTER | Age: 59
End: 2024-03-05
Attending: INTERNAL MEDICINE
Payer: COMMERCIAL

## 2024-03-05 DIAGNOSIS — M54.42 CHRONIC BILATERAL LOW BACK PAIN WITH BILATERAL SCIATICA: ICD-10-CM

## 2024-03-05 DIAGNOSIS — S81.801A OPEN WOUND OF RIGHT LOWER EXTREMITY, INITIAL ENCOUNTER: ICD-10-CM

## 2024-03-05 DIAGNOSIS — M54.41 CHRONIC BILATERAL LOW BACK PAIN WITH BILATERAL SCIATICA: ICD-10-CM

## 2024-03-05 DIAGNOSIS — G89.29 CHRONIC BILATERAL LOW BACK PAIN WITH BILATERAL SCIATICA: ICD-10-CM

## 2024-03-05 DIAGNOSIS — S81.802A OPEN WOUND OF LEFT LOWER EXTREMITY, INITIAL ENCOUNTER: ICD-10-CM

## 2024-03-05 RX ORDER — GABAPENTIN 800 MG/1
800 TABLET ORAL 3 TIMES DAILY
Qty: 360 TABLET | Refills: 3 | OUTPATIENT
Start: 2024-03-05

## 2024-03-05 RX ORDER — GABAPENTIN 800 MG/1
800 TABLET ORAL 3 TIMES DAILY
Qty: 270 TABLET | Refills: 0 | Status: SHIPPED | OUTPATIENT
Start: 2024-03-05

## 2024-03-05 NOTE — TELEPHONE ENCOUNTER
Patient may need her appointment in April rescheduled if she would like to still see Dr. Rubin for it.

## 2024-03-05 NOTE — TELEPHONE ENCOUNTER
Can we please let her know that a 90 day supply has been sent to cover while Dr. Rubin is out of the office.

## 2024-03-11 ENCOUNTER — OFFICE VISIT (OUTPATIENT)
Dept: WOUND CARE | Facility: MEDICAL CENTER | Age: 59
End: 2024-03-11
Attending: INTERNAL MEDICINE
Payer: COMMERCIAL

## 2024-03-11 VITALS
HEART RATE: 110 BPM | SYSTOLIC BLOOD PRESSURE: 119 MMHG | TEMPERATURE: 97.9 F | OXYGEN SATURATION: 95 % | DIASTOLIC BLOOD PRESSURE: 78 MMHG | RESPIRATION RATE: 18 BRPM

## 2024-03-11 DIAGNOSIS — S81.802D OPEN WOUND OF LEFT LOWER EXTREMITY, SUBSEQUENT ENCOUNTER: ICD-10-CM

## 2024-03-11 DIAGNOSIS — I89.0 LYMPHEDEMA: ICD-10-CM

## 2024-03-11 DIAGNOSIS — G89.4 CHRONIC PAIN SYNDROME: ICD-10-CM

## 2024-03-11 DIAGNOSIS — M25.571 PAIN IN JOINT OF RIGHT FOOT: ICD-10-CM

## 2024-03-11 DIAGNOSIS — S81.801D OPEN WOUND OF RIGHT LOWER EXTREMITY, SUBSEQUENT ENCOUNTER: ICD-10-CM

## 2024-03-11 DIAGNOSIS — E66.9 OBESITY (BMI 30-39.9): ICD-10-CM

## 2024-03-11 PROCEDURE — 3078F DIAST BP <80 MM HG: CPT | Performed by: STUDENT IN AN ORGANIZED HEALTH CARE EDUCATION/TRAINING PROGRAM

## 2024-03-11 PROCEDURE — 11042 DBRDMT SUBQ TIS 1ST 20SQCM/<: CPT

## 2024-03-11 PROCEDURE — 11042 DBRDMT SUBQ TIS 1ST 20SQCM/<: CPT | Performed by: STUDENT IN AN ORGANIZED HEALTH CARE EDUCATION/TRAINING PROGRAM

## 2024-03-11 PROCEDURE — 3074F SYST BP LT 130 MM HG: CPT | Performed by: STUDENT IN AN ORGANIZED HEALTH CARE EDUCATION/TRAINING PROGRAM
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
## 2024-03-11 NOTE — PATIENT INSTRUCTIONS
Avoid prolonged standing or sitting without elevating your legs.  - Multilayer compression wrap to both legs. Do not get wet and keep on for the week. Only remove if temperature or sensation changes.   If compression needs to be removed, un-wrap it do not cut it off.     Should you experience any significant changes in your wound(s), such as infection (redness, swelling, localized heat, increased pain, fever > 101 F, chills) or have any questions regarding your home care instructions, please contact the wound center at [phone]. If after hours, contact your primary care physician or go to the hospital emergency room.   Keep dressing clean, dry and covered while bathing. Only change dressing if it becomes over saturated, soiled or falls off.

## 2024-03-14 ENCOUNTER — APPOINTMENT (OUTPATIENT)
Dept: PHYSICAL THERAPY | Facility: REHABILITATION | Age: 59
End: 2024-03-14
Attending: STUDENT IN AN ORGANIZED HEALTH CARE EDUCATION/TRAINING PROGRAM
Payer: COMMERCIAL
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
## 2024-03-20 ENCOUNTER — APPOINTMENT (OUTPATIENT)
Dept: PHYSICAL THERAPY | Facility: REHABILITATION | Age: 59
End: 2024-03-20
Attending: STUDENT IN AN ORGANIZED HEALTH CARE EDUCATION/TRAINING PROGRAM
Payer: COMMERCIAL

## 2024-03-25 ENCOUNTER — PHYSICAL THERAPY (OUTPATIENT)
Dept: PHYSICAL THERAPY | Facility: REHABILITATION | Age: 59
End: 2024-03-25
Attending: STUDENT IN AN ORGANIZED HEALTH CARE EDUCATION/TRAINING PROGRAM
Payer: COMMERCIAL

## 2024-03-25 ENCOUNTER — OFFICE VISIT (OUTPATIENT)
Dept: MEDICAL GROUP | Age: 59
End: 2024-03-25
Payer: COMMERCIAL

## 2024-03-25 VITALS
OXYGEN SATURATION: 98 % | RESPIRATION RATE: 16 BRPM | SYSTOLIC BLOOD PRESSURE: 120 MMHG | BODY MASS INDEX: 34.9 KG/M2 | TEMPERATURE: 97 F | HEIGHT: 63 IN | DIASTOLIC BLOOD PRESSURE: 70 MMHG | HEART RATE: 89 BPM

## 2024-03-25 DIAGNOSIS — S81.801D OPEN WOUND OF BOTH LOWER EXTREMITIES, SUBSEQUENT ENCOUNTER: ICD-10-CM

## 2024-03-25 DIAGNOSIS — I89.0 LYMPHEDEMA OF EXTREMITY: ICD-10-CM

## 2024-03-25 DIAGNOSIS — M17.0 PRIMARY OSTEOARTHRITIS OF BOTH KNEES: ICD-10-CM

## 2024-03-25 DIAGNOSIS — S81.802D OPEN WOUND OF BOTH LOWER EXTREMITIES, SUBSEQUENT ENCOUNTER: ICD-10-CM

## 2024-03-25 DIAGNOSIS — I89.0 LYMPHEDEMA: ICD-10-CM

## 2024-03-25 PROCEDURE — 3074F SYST BP LT 130 MM HG: CPT | Performed by: PHYSICIAN ASSISTANT

## 2024-03-25 PROCEDURE — 99214 OFFICE O/P EST MOD 30 MIN: CPT | Performed by: PHYSICIAN ASSISTANT

## 2024-03-25 PROCEDURE — 97162 PT EVAL MOD COMPLEX 30 MIN: CPT

## 2024-03-25 PROCEDURE — 3078F DIAST BP <80 MM HG: CPT | Performed by: PHYSICIAN ASSISTANT

## 2024-03-25 RX ORDER — NAPROXEN 375 MG/1
375 TABLET ORAL
COMMUNITY
End: 2024-03-25

## 2024-03-25 RX ORDER — ALBUTEROL SULFATE 0.63 MG/3ML
1 SOLUTION RESPIRATORY (INHALATION)
COMMUNITY
End: 2024-03-25

## 2024-03-25 RX ORDER — MORPHINE SULFATE 15 MG/1
15 TABLET ORAL
COMMUNITY
End: 2024-03-25

## 2024-03-25 RX ORDER — PHENTERMINE HYDROCHLORIDE 37.5 MG/1
CAPSULE ORAL
COMMUNITY
Start: 2024-02-21

## 2024-03-25 RX ORDER — FERROUS SULFATE 325(65) MG
325 TABLET ORAL
COMMUNITY
End: 2024-03-25

## 2024-03-25 RX ORDER — FLUTICASONE PROPIONATE 110 UG/1
1 AEROSOL, METERED RESPIRATORY (INHALATION)
COMMUNITY
End: 2024-03-25

## 2024-03-25 RX ORDER — METHOCARBAMOL 500 MG/1
500 TABLET, FILM COATED ORAL
COMMUNITY
End: 2024-03-25

## 2024-03-25 RX ORDER — POTASSIUM CHLORIDE 20 MEQ/1
20 TABLET, EXTENDED RELEASE ORAL
COMMUNITY

## 2024-03-25 ASSESSMENT — PATIENT HEALTH QUESTIONNAIRE - PHQ9: CLINICAL INTERPRETATION OF PHQ2 SCORE: 0
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
## 2024-03-25 NOTE — PROGRESS NOTES
cc: FMLA paperwork    Subjective:     HPI  PCP Dr. Rubin, unable to see today  Karine Ovalle is a 58 y.o. female presenting requesting FMLA paperwork to be filled out and completed.  She has multiple comorbidities, recently due to lymphedema has been experiencing open wounds, she has been going to wound care almost weekly.  Also receiving treatment for her lymphadenopathy.  She does have osteoarthritis of her right knee, hopefully will be having total knee replacement in May of this year as long as her wounds heal.  She will intermittently have flares of her lymphedema, exacerbating her fibromyalgia, this in turn will have to make us work.  She needs FMLA for her flares and also for her to go to wound care and her lymphedema specialist.        Review of systems:  See above.       Current Outpatient Medications:     phentermine 37.5 MG capsule, TAKE 1 CAPSULE BY MOUTH IN THE MORNING FOR 90 DAYS, Disp: , Rfl:     potassium chloride SA (KDUR) 20 MEQ Tab CR, Take 20 mEq by mouth., Disp: , Rfl:     gabapentin (NEURONTIN) 800 MG tablet, TAKE 1 TABLET BY MOUTH THREE TIMES DAILY, Disp: 270 Tablet, Rfl: 0    methocarbamol (ROBAXIN) 500 MG Tab, TAKE 2 TABLET BY MOUTH 4 TIMES DAILY AS NEEDED FOR  BACK  PAIN  AND  SPASMS, Disp: 240 Tablet, Rfl: 0    albuterol 108 (90 Base) MCG/ACT Aero Soln inhalation aerosol, INHALE 2 PUFFS BY MOUTH EVERY 4 HOURS AS NEEDED FOR SHORTNESS OF BREATH., Disp: 9 g, Rfl: 0    gabapentin (NEURONTIN) 800 MG tablet, Take 1 Tablet by mouth 3 times a day., Disp: , Rfl:     oxyCODONE-acetaminophen (PERCOCET-10)  MG Tab, Take 1 Tablet by mouth every 6 hours., Disp: , Rfl:     fluticasone (FLOVENT HFA) 44 MCG/ACT Aerosol, Inhale 2 Puffs 2 times a day as needed (tobacco smoke). Everyday maintenance steroid inhaler  Indications: Asthma, Disp: 1 Each, Rfl: 11    gabapentin (NEURONTIN) 800 MG tablet, Take 1 Tablet by mouth 3 times a day. Indications: Neuropathic Pain, Disp: 360 Tablet, Rfl: 3     ipratropium-albuterol (DUONEB) 0.5-2.5 (3) MG/3ML nebulizer solution, Inhale 3 mL by nebulization every four hours as needed for Shortness of Breath., Disp: 90 Each, Rfl: 0    Naloxone (NARCAN) 4 MG/0.1ML Liquid, naloxone 4 mg/actuation nasal spray  CALL 911. SPR CONTENTS OF ONE SPRAYER (0.1ML) INTO ONE NOSTRIL. REPEAT IN 2-3 MIN IF SYMPTOMS OF OPIOID EMERGENCY PERSIST, ALTERNATE NOSTRILS, Disp: , Rfl:     morphine ER (MS CONTIN) 15 MG Tab CR tablet, morphine ER 15 mg tablet,extended release  Take 1 tablet every 12 hours by oral route as directed for 30 days.  Do not drive, drink etoh while taking., Disp: , Rfl:     morphine ER (MS CONTIN) 30 MG Tab CR tablet, morphine ER 30 mg tablet,extended release  TAKE 1 TABLET BY MOUTH EVERY 12 HOURS FOR 3 DAYS, Disp: , Rfl:     albuterol 108 (90 Base) MCG/ACT Aero Soln inhalation aerosol, Inhale 2 Puffs every 6 hours as needed for Shortness of Breath., Disp: 8.5 g, Rfl: 0    furosemide (LASIX) 40 MG Tab, Take 1 Tablet by mouth 1 time a day as needed (leg edema). Patient reports only takes if leg edema increases. She doesn't use daily as prescribed.   Indications: Edema, Disp: 90 Tablet, Rfl: 4    morphine ER (MS CONTIN) 15 MG Tab CR tablet, Take 15 mg by mouth every 12 hours. Indications: Pain, Disp: , Rfl:     potassium Chloride ER (K-TAB) 20 MEQ Tab CR tablet, Take 20 mEq by mouth 1 time a day as needed (Per patient, she takes with furosemide. ). Indications: takes with furosemide, Disp: , Rfl:     vitamin D3 (CHOLECALCIFEROL) 1000 Unit (25 mcg) Tab, Take 1,000 Units by mouth every day. Indications: Vitamin D Deficiency, dietary supplement, Disp: , Rfl:     Biotin 5000 MCG Cap, Take 1 Capsule by mouth every day. Indications: dietary supplement, Disp: , Rfl:     zinc sulfate (ZINCATE) 220 (50 Zn) MG Cap, Take 220 mg by mouth every day. Indications: Impaired Wound Healing, dietary supplement , Disp: , Rfl:     Multiple Vitamin (MULTIVITAMIN ADULT) Tab, Take 1 Tablet by mouth  every day. Indications: Nutritional Support, Disp: , Rfl:     ascorbic acid (VITAMIN C) 500 MG tablet, Take 1 Tablet by mouth 2 times a day., Disp: 100 Tablet, Rfl: 4    oxyCODONE-acetaminophen (PERCOCET-10)  MG Tab, Take 1 Tablet by mouth every 6 hours as needed for Moderate Pain or Severe Pain. Indications: breakthrough pain betweeen morphine administration, Disp: , Rfl: 0    CALCIUM CARBONATE-VITAMIN D PO, Take 1 Tablet by mouth every day. Indications: Low Amount of Calcium in the Blood, Disp: , Rfl:     ferrous sulfate 325 (65 Fe) MG tablet, Take 1 Tab by mouth every morning with breakfast., Disp: 30 Tab, Rfl: 1    naproxen (NAPROSYN) 375 MG Tab, Take 375 mg by mouth. (Patient not taking: Reported on 3/25/2024), Disp: , Rfl:     albuterol (ACCUNEB) 0.63 MG/3ML nebulizer solution, Inhale 1 Ampule. (Patient not taking: Reported on 3/25/2024), Disp: , Rfl:     ferrous sulfate 325 (65 Fe) MG tablet, Take 325 mg by mouth. (Patient not taking: Reported on 3/25/2024), Disp: , Rfl:     fluticasone (FLOVENT HFA) 110 MCG/ACT Aerosol, Inhale 1 Puff. (Patient not taking: Reported on 3/25/2024), Disp: , Rfl:     methocarbamol (ROBAXIN) 500 MG Tab, Take 500 mg by mouth. (Patient not taking: Reported on 3/25/2024), Disp: , Rfl:     morphine (MS IR) 15 MG tablet, Take 15 mg by mouth. (Patient not taking: Reported on 3/25/2024), Disp: , Rfl:     beclomethasone HFA (QVAR REDIHALER) 80 MCG/ACT inhaler, Inhale 2 Puffs 2 times a day. (Patient not taking: Reported on 3/25/2024), Disp: 7.3 g, Rfl: 11    fluticasone-salmeterol (ADVAIR) 250-50 MCG/ACT AEROSOL POWDER, BREATH ACTIVATED, Inhale 1 Puff every 12 hours. (Patient not taking: Reported on 2/13/2024), Disp: 1 Each, Rfl: 5    Allergies, past medical history, past surgical history, family history, social history reviewed and updated    Objective:     Vitals: /70 (BP Location: Left arm, Patient Position: Sitting, BP Cuff Size: Adult)   Pulse 89   Temp 36.1 °C (97 °F)  "(Temporal)   Resp 16   Ht 1.6 m (5' 3\")   LMP  (LMP Unknown)   SpO2 98%   BMI 34.90 kg/m²   General: Alert, pleasant, NAD  HEENT: Normocephalic. Neck supple.    Respiratory: Normal respiratory effort.    Skin: Warm, dry, no rashes.  Extremities: Lymphedema of bilateral lower extremities.  Wounds are dressed.   Psych:  Affect/mood is normal, judgement is good, memory is intact, grooming is appropriate.    Assessment/Plan:     Karine was seen today for paperwork.    Diagnoses and all orders for this visit:    Primary osteoarthritis of both knees- sp right TKR 2015; left TKR tbd in future dr nowak  Currently followed by orthopedics.  Possible left total knee 5/5/2024, as long as wounds are healed.  [facility] paperwork filled out and completed    Lymphedema  Currently working with physical therapy.  [place] paperwork filled out and completed so that she can attend PT, also when she has flares exacerbating her fibromyalgia, LA time given for exacerbations.  Please refer to paperwork    Open wound of both lower extremities, subsequent encounter  Currently going to wound care weekly.  [place] paperwork filled out and completed      Return in about 3 months (around 6/25/2024) for w/pcp, Annual PX.  "

## 2024-03-26 ENCOUNTER — OFFICE VISIT (OUTPATIENT)
Dept: WOUND CARE | Facility: MEDICAL CENTER | Age: 59
End: 2024-03-26
Attending: INTERNAL MEDICINE
Payer: COMMERCIAL

## 2024-03-26 VITALS
TEMPERATURE: 97.8 F | OXYGEN SATURATION: 94 % | SYSTOLIC BLOOD PRESSURE: 128 MMHG | RESPIRATION RATE: 20 BRPM | HEART RATE: 107 BPM | DIASTOLIC BLOOD PRESSURE: 68 MMHG

## 2024-03-26 DIAGNOSIS — I89.0 LYMPHEDEMA: ICD-10-CM

## 2024-03-26 DIAGNOSIS — E66.9 OBESITY (BMI 30-39.9): ICD-10-CM

## 2024-03-26 DIAGNOSIS — M25.571 PAIN IN JOINT OF RIGHT FOOT: ICD-10-CM

## 2024-03-26 DIAGNOSIS — S81.802D OPEN WOUND OF LEFT LOWER EXTREMITY, SUBSEQUENT ENCOUNTER: ICD-10-CM

## 2024-03-26 DIAGNOSIS — S81.801D OPEN WOUND OF RIGHT LOWER EXTREMITY, SUBSEQUENT ENCOUNTER: ICD-10-CM

## 2024-03-26 DIAGNOSIS — G89.4 CHRONIC PAIN SYNDROME: ICD-10-CM

## 2024-03-26 PROCEDURE — 3074F SYST BP LT 130 MM HG: CPT | Performed by: NURSE PRACTITIONER

## 2024-03-26 PROCEDURE — 3078F DIAST BP <80 MM HG: CPT | Performed by: NURSE PRACTITIONER

## 2024-03-26 PROCEDURE — 11042 DBRDMT SUBQ TIS 1ST 20SQCM/<: CPT

## 2024-03-26 PROCEDURE — 11042 DBRDMT SUBQ TIS 1ST 20SQCM/<: CPT | Performed by: NURSE PRACTITIONER

## 2024-03-26 NOTE — PATIENT INSTRUCTIONS
After Visit Summary Wound Care Instructions    Infection - Go to ER with increased redness and swelling, localized heat over wound and surrounding area/fever/chills/nausea and vomiting, when to call doctor or go to Emergency Room.     Dressing Changes - Instructed patient rationale for wound care products. Instructed to keep dressings clean and dry, shower on clinic days right before coming in. Change your dressing if it becomes soiled, soaked, or falls off.      Questions - should you have any questions regarding your home care instructions, please contact the wound center at [phone]. If after hours, contact your primary care physician or go to the hospital emergency room.
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
## 2024-03-27 ENCOUNTER — TELEPHONE (OUTPATIENT)
Dept: WOUND CARE | Facility: MEDICAL CENTER | Age: 59
End: 2024-03-27
Payer: COMMERCIAL

## 2024-03-27 ENCOUNTER — APPOINTMENT (OUTPATIENT)
Dept: PHYSICAL THERAPY | Facility: REHABILITATION | Age: 59
End: 2024-03-27
Attending: STUDENT IN AN ORGANIZED HEALTH CARE EDUCATION/TRAINING PROGRAM
Payer: COMMERCIAL

## 2024-03-27 NOTE — TELEPHONE ENCOUNTER
Patient brought in [facility] paperwork for us to fill out at her appointment yesterday. This paperwork has already been filled out, signed and scanned into media by patients PCP. Phone call to patient and left a message to notify her that we do not fill out this paperwork twice and she is welcome to come  here paperwork if she would like or we can shred them.

## 2024-03-27 NOTE — PROGRESS NOTES
Advanced Wound Care   [facility]   [address]   [address]   [place] [address]   [phone] Fax: [phone]    [facility]    Duration of Supply Order: 90 Days  Dispense as written.     Wound 02/13/24 Leg Anterior;Lateral Left (Active)   Wound Image    03/26/24 1500   Site Assessment Pink;Yellow 03/26/24 1500   Periwound Assessment Hemosiderin Staining 03/26/24 1500   Margins Attached edges 03/26/24 1500   Closure Secondary intention 02/27/24 1600   Drainage Amount Small 03/26/24 1500   Drainage Description Serosanguineous 03/26/24 1500   Treatments Cleansed;Topical Lidocaine;Provider debridement 03/26/24 1500   Wound Cleansing Hypochlorus Acid 03/26/24 1500   Periwound Protectant Skin Moisturizer;No-sting Skin Prep 03/26/24 1500   Dressing Changed Changed 03/26/24 1500   Dressing Cleansing/Solutions Not Applicable 03/26/24 1500   Dressing Options Hydrofiber Silver;Silicone Adhesive Foam;Compression Wrap Two Layer 03/26/24 1500   Dressing Change/Treatment Frequency Weekly, and As Needed 03/26/24 1500   Wound Team Following Weekly 03/26/24 1500   Wound Length (cm) 0.4 cm 03/26/24 1500   Wound Width (cm) 2 cm 03/26/24 1500   Wound Depth (cm) 0.1 cm 03/26/24 1500   Wound Surface Area (cm^2) 0.8 cm^2 03/26/24 1500   Wound Volume (cm^3) 0.08 cm^3 03/26/24 1500   Post-Procedure Length (cm) 0.4 cm 03/26/24 1500   Post-Procedure Width (cm) 1.1 cm 03/26/24 1500   Post-Procedure Depth (cm) 0.1 cm 03/26/24 1500   Post-Procedure Surface Area (cm^2) 0.44 cm^2 03/26/24 1500   Post-Procedure Volume (cm^3) 0.044 cm^3 03/26/24 1500   Wound Healing % -78 03/26/24 1500   Tunneling (cm) 0 cm 03/26/24 1500   Undermining (cm) 0 cm 03/26/24 1500   Wound Odor None 03/26/24 1500   Exposed Structures None 03/26/24 1500       Wound 02/13/24 Leg Medial Right (Active)   Wound Image    03/26/24 1500   Site Assessment Pink;Pale;Yellow 03/26/24 1500   Periwound Assessment Hemosiderin Staining;Edema 03/26/24  1500   Margins Attached edges 03/26/24 1500   Closure Secondary intention 02/27/24 1600   Drainage Amount Small 03/26/24 1500   Drainage Description Serosanguineous 03/26/24 1500   Treatments Cleansed;Topical Lidocaine;Provider debridement 03/26/24 1500   Wound Cleansing Hypochlorus Acid 03/26/24 1500   Periwound Protectant No-sting Skin Prep;Skin Moisturizer 03/26/24 1500   Dressing Changed Changed 03/26/24 1500   Dressing Cleansing/Solutions Not Applicable 03/26/24 1500   Dressing Options Hydrofiber Silver;Silicone Adhesive Foam;Compression Wrap Two Layer 03/26/24 1500   Dressing Change/Treatment Frequency Weekly, and As Needed 03/26/24 1500   Wound Team Following Weekly 03/26/24 1500   Wound Length (cm) 2.1 cm 03/26/24 1500   Wound Width (cm) 2.3 cm 03/26/24 1500   Wound Depth (cm) 0.1 cm 03/26/24 1500   Wound Surface Area (cm^2) 4.83 cm^2 03/26/24 1500   Wound Volume (cm^3) 0.483 cm^3 03/26/24 1500   Post-Procedure Length (cm) 2.1 cm 03/26/24 1500   Post-Procedure Width (cm) 2.3 cm 03/26/24 1500   Post-Procedure Depth (cm) 0.2 cm 03/26/24 1500   Post-Procedure Surface Area (cm^2) 4.83 cm^2 03/26/24 1500   Post-Procedure Volume (cm^3) 0.966 cm^3 03/26/24 1500   Wound Healing % 25 03/26/24 1500   Tunneling (cm) 0 cm 03/26/24 1500   Undermining (cm) 0 cm 03/26/24 1500   Wound Odor None 03/26/24 1500   Exposed Structures None 03/26/24 1500              2% topical Lidocaine applied prior to debridement with ~5 minute dwell time. Conservative sharp wound debridement using curette to remove ~1 to 2cm2 nonviable tissue from **location** wound bed. Patient tolerated well; denied pain.    ________________________________

## 2024-03-27 NOTE — PROGRESS NOTES
Solaris Ready Wrap Fusion Kit ordered from [employer] to be delivered to Patients home.  Instructed her to bring them to he next wound appt.

## 2024-03-28 ENCOUNTER — APPOINTMENT (OUTPATIENT)
Dept: WOUND CARE | Facility: MEDICAL CENTER | Age: 59
End: 2024-03-28
Attending: INTERNAL MEDICINE
Payer: COMMERCIAL

## 2024-03-30 DIAGNOSIS — M51.36 DDD (DEGENERATIVE DISC DISEASE), LUMBAR: ICD-10-CM

## 2024-03-30 DIAGNOSIS — M54.41 CHRONIC BILATERAL LOW BACK PAIN WITH BILATERAL SCIATICA: ICD-10-CM

## 2024-03-30 DIAGNOSIS — G89.29 CHRONIC BILATERAL LOW BACK PAIN WITH BILATERAL SCIATICA: ICD-10-CM

## 2024-03-30 DIAGNOSIS — M50.30 DDD (DEGENERATIVE DISC DISEASE), CERVICAL: ICD-10-CM

## 2024-03-30 DIAGNOSIS — M54.42 CHRONIC BILATERAL LOW BACK PAIN WITH BILATERAL SCIATICA: ICD-10-CM

## 2024-04-01 DIAGNOSIS — G89.29 CHRONIC BILATERAL LOW BACK PAIN WITH BILATERAL SCIATICA: ICD-10-CM

## 2024-04-01 DIAGNOSIS — M50.30 DDD (DEGENERATIVE DISC DISEASE), CERVICAL: ICD-10-CM

## 2024-04-01 DIAGNOSIS — M51.36 DDD (DEGENERATIVE DISC DISEASE), LUMBAR: ICD-10-CM

## 2024-04-01 DIAGNOSIS — M54.42 CHRONIC BILATERAL LOW BACK PAIN WITH BILATERAL SCIATICA: ICD-10-CM

## 2024-04-01 DIAGNOSIS — M54.41 CHRONIC BILATERAL LOW BACK PAIN WITH BILATERAL SCIATICA: ICD-10-CM

## 2024-04-01 RX ORDER — METHOCARBAMOL 500 MG/1
TABLET, FILM COATED ORAL
Qty: 240 TABLET | Refills: 0 | Status: SHIPPED | OUTPATIENT
Start: 2024-04-01

## 2024-04-01 RX ORDER — METHOCARBAMOL 500 MG/1
TABLET, FILM COATED ORAL
Qty: 240 TABLET | Refills: 0 | OUTPATIENT
Start: 2024-04-01

## 2024-04-03 ENCOUNTER — APPOINTMENT (OUTPATIENT)
Dept: RADIOLOGY | Facility: MEDICAL CENTER | Age: 59
End: 2024-04-03
Attending: NURSE PRACTITIONER
Payer: COMMERCIAL

## 2024-04-03 ENCOUNTER — APPOINTMENT (OUTPATIENT)
Dept: PHYSICAL THERAPY | Facility: REHABILITATION | Age: 59
End: 2024-04-03
Attending: STUDENT IN AN ORGANIZED HEALTH CARE EDUCATION/TRAINING PROGRAM
Payer: COMMERCIAL

## 2024-04-04 ENCOUNTER — NON-PROVIDER VISIT (OUTPATIENT)
Dept: WOUND CARE | Facility: MEDICAL CENTER | Age: 59
End: 2024-04-04
Attending: INTERNAL MEDICINE
Payer: COMMERCIAL

## 2024-04-04 PROCEDURE — 97602 WOUND(S) CARE NON-SELECTIVE: CPT

## 2024-04-04 NOTE — PATIENT INSTRUCTIONS
-Keep your wound dressing clean, dry, and intact. Only change dressing if it's over saturated, soiled or falls off.     -Change your dressing if it becomes soiled, soaked, or falls off.    -Remove your compression wrap if you have severe pain, severe swelling, numbness, color change, or temperature change in your toes. If you need to remove your compression wrap, do so by unrolling it. Do not cut the compression wrap off to prevent cutting yourself on accident.    -Should you experience any significant changes in your wound(s), such as infection (redness, swelling, localized heat, increased pain, fever > 101 F, chills) or have any questions regarding your home care instructions, please contact the wound center at [phone]. If after hours, contact your primary care physician or go to the hospital emergency room.

## 2024-04-04 NOTE — PROGRESS NOTES
Non-selective debridement to RLE and LLE wounds and gmoez-wound using hypochlrous acid and gauze to remove biofilm and non-viable tissue.

## 2024-04-09 ENCOUNTER — APPOINTMENT (OUTPATIENT)
Dept: PHYSICAL THERAPY | Facility: REHABILITATION | Age: 59
End: 2024-04-09
Attending: STUDENT IN AN ORGANIZED HEALTH CARE EDUCATION/TRAINING PROGRAM
Payer: COMMERCIAL

## 2024-04-11 ENCOUNTER — NON-PROVIDER VISIT (OUTPATIENT)
Dept: WOUND CARE | Facility: MEDICAL CENTER | Age: 59
End: 2024-04-11
Attending: INTERNAL MEDICINE
Payer: COMMERCIAL

## 2024-04-11 ENCOUNTER — APPOINTMENT (OUTPATIENT)
Dept: PHYSICAL THERAPY | Facility: REHABILITATION | Age: 59
End: 2024-04-11
Attending: STUDENT IN AN ORGANIZED HEALTH CARE EDUCATION/TRAINING PROGRAM
Payer: COMMERCIAL

## 2024-04-11 PROCEDURE — 97597 DBRDMT OPN WND 1ST 20 CM/<: CPT

## 2024-04-11 NOTE — PATIENT INSTRUCTIONS
Avoid prolonged standing or sitting without elevating your legs.  - Knee-high gradient compression to legs    Should you experience any significant changes in your wound(s), such as infection (redness, swelling, localized heat, increased pain, fever > 101 F, chills) or have any questions regarding your home care instructions, please contact the wound center at [phone]. If after hours, contact your primary care physician or go to the hospital emergency room.   Keep dressing clean, dry and covered while bathing. Only change dressing if it becomes over saturated, soiled or falls off.

## 2024-04-11 NOTE — PROGRESS NOTES
2% Viscous lidocaine applied to wound and periwound 5 minutes dwell time.  CSWD using curette to remove approx. ~1.9 cm2 of nonviable tissue from wound bed.

## 2024-04-15 ENCOUNTER — APPOINTMENT (OUTPATIENT)
Dept: PHYSICAL THERAPY | Facility: REHABILITATION | Age: 59
End: 2024-04-15
Attending: STUDENT IN AN ORGANIZED HEALTH CARE EDUCATION/TRAINING PROGRAM
Payer: COMMERCIAL

## 2024-04-17 ENCOUNTER — APPOINTMENT (OUTPATIENT)
Dept: PHYSICAL THERAPY | Facility: REHABILITATION | Age: 59
End: 2024-04-17
Attending: STUDENT IN AN ORGANIZED HEALTH CARE EDUCATION/TRAINING PROGRAM
Payer: COMMERCIAL

## 2024-04-18 ENCOUNTER — NON-PROVIDER VISIT (OUTPATIENT)
Dept: WOUND CARE | Facility: MEDICAL CENTER | Age: 59
End: 2024-04-18
Attending: INTERNAL MEDICINE
Payer: COMMERCIAL

## 2024-04-18 PROCEDURE — 97597 DBRDMT OPN WND 1ST 20 CM/<: CPT

## 2024-04-18 NOTE — PROGRESS NOTES
"CSWD using curette to remove ~0.6cm2 nonviable tissue from Right medial leg wound bed. 2% topical Lidocaine applied prior to debridement with ~5 minute dwell time. Patient tolerated well; denied pain.     ** Consider changing treatment if no improvement at next clinic visit.     Patient has been having more swelling and reports several \"water blisters\" on BLE. Sites were superficial and treated with TRIAD hydro paste. Patient admits to scratching BLE when they swell because of itchiness. Patient counseled on compression garments and skin hygiene.   "

## 2024-04-18 NOTE — PATIENT INSTRUCTIONS
-Keep your wound dressing clean, dry, and intact. Only change dressing if it's over saturated, soiled or falls off.     -Change your dressing if it becomes soiled, soaked, or falls off.    -Remove your compression wrap if you have severe pain, severe swelling, numbness, color change, or temperature change in your toes. If you need to remove your compression wrap, do so by unrolling it. Do not cut the compression wrap off to prevent cutting yourself on accident.    -Should you experience any significant changes in your wound(s), such as infection (redness, swelling, localized heat, increased pain, fever > 101 F, chills) or have any questions regarding your home care instructions, please contact the wound center at [phone]. If after hours, contact your primary care physician or go to the hospital emergency room.

## 2024-04-22 ENCOUNTER — APPOINTMENT (OUTPATIENT)
Dept: PHYSICAL THERAPY | Facility: REHABILITATION | Age: 59
End: 2024-04-22
Attending: STUDENT IN AN ORGANIZED HEALTH CARE EDUCATION/TRAINING PROGRAM
Payer: COMMERCIAL

## 2024-04-24 ENCOUNTER — APPOINTMENT (OUTPATIENT)
Dept: PHYSICAL THERAPY | Facility: REHABILITATION | Age: 59
End: 2024-04-24
Attending: STUDENT IN AN ORGANIZED HEALTH CARE EDUCATION/TRAINING PROGRAM
Payer: COMMERCIAL

## 2024-04-29 ENCOUNTER — APPOINTMENT (OUTPATIENT)
Dept: PHYSICAL THERAPY | Facility: REHABILITATION | Age: 59
End: 2024-04-29
Attending: STUDENT IN AN ORGANIZED HEALTH CARE EDUCATION/TRAINING PROGRAM
Payer: COMMERCIAL

## 2024-04-30 ENCOUNTER — OFFICE VISIT (OUTPATIENT)
Dept: WOUND CARE | Facility: MEDICAL CENTER | Age: 59
End: 2024-04-30
Attending: INTERNAL MEDICINE
Payer: COMMERCIAL

## 2024-04-30 VITALS
HEART RATE: 104 BPM | RESPIRATION RATE: 18 BRPM | SYSTOLIC BLOOD PRESSURE: 144 MMHG | OXYGEN SATURATION: 95 % | DIASTOLIC BLOOD PRESSURE: 93 MMHG | TEMPERATURE: 98.2 F

## 2024-04-30 DIAGNOSIS — E66.9 OBESITY (BMI 30-39.9): ICD-10-CM

## 2024-04-30 DIAGNOSIS — S81.802D OPEN WOUND OF LEFT LOWER EXTREMITY, SUBSEQUENT ENCOUNTER: ICD-10-CM

## 2024-04-30 DIAGNOSIS — G89.4 CHRONIC PAIN SYNDROME: ICD-10-CM

## 2024-04-30 DIAGNOSIS — S81.801D OPEN WOUND OF RIGHT LOWER EXTREMITY, SUBSEQUENT ENCOUNTER: ICD-10-CM

## 2024-04-30 DIAGNOSIS — I89.0 LYMPHEDEMA: ICD-10-CM

## 2024-04-30 PROCEDURE — 11042 DBRDMT SUBQ TIS 1ST 20SQCM/<: CPT

## 2024-05-01 NOTE — PATIENT INSTRUCTIONS
After Visit Summary Wound Care Instructions    Nutrition - Patient instructed increased protein diet unless contraindicated in renal failure (meat, eggs, fish, yogurt, cottage cheese, beans), use of multivitamin with minerals and Arginaid supplementation (check if ok with Primary Care Provider first).    Infection -  instructed signs and symptoms of infection, increased redness and swelling, localized heat over wound and surrounding area/fever/chills/nausea and vomiting, when to call doctor or go to Emergency Room.     Dressing Changes - Instructed patient rationale for wound care products. Instructed to keep dressings clean and dry, shower on clinic days right before coming in. Change your dressing if it becomes soiled, soaked, or falls off.    Questions - should you have any questions regarding your home care instructions, please contact the wound center at [phone]. If after hours, contact your primary care physician or go to the hospital emergency room.
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
## 2024-05-03 ENCOUNTER — HOSPITAL ENCOUNTER (EMERGENCY)
Facility: MEDICAL CENTER | Age: 59
End: 2024-05-03
Attending: EMERGENCY MEDICINE
Payer: COMMERCIAL

## 2024-05-03 ENCOUNTER — APPOINTMENT (OUTPATIENT)
Dept: RADIOLOGY | Facility: MEDICAL CENTER | Age: 59
End: 2024-05-03
Attending: EMERGENCY MEDICINE
Payer: COMMERCIAL

## 2024-05-03 VITALS
WEIGHT: 194.22 LBS | SYSTOLIC BLOOD PRESSURE: 111 MMHG | DIASTOLIC BLOOD PRESSURE: 65 MMHG | TEMPERATURE: 97.8 F | OXYGEN SATURATION: 95 % | RESPIRATION RATE: 18 BRPM | HEIGHT: 63 IN | BODY MASS INDEX: 34.41 KG/M2 | HEART RATE: 98 BPM

## 2024-05-03 DIAGNOSIS — S40.012A CONTUSION OF LEFT SHOULDER, INITIAL ENCOUNTER: ICD-10-CM

## 2024-05-03 RX ORDER — IBUPROFEN 600 MG/1
600 TABLET ORAL ONCE
Status: COMPLETED | OUTPATIENT
Start: 2024-05-03 | End: 2024-05-03

## 2024-05-03 RX ADMIN — IBUPROFEN 600 MG: 600 TABLET, FILM COATED ORAL at 13:29

## 2024-05-03 ASSESSMENT — FIBROSIS 4 INDEX: FIB4 SCORE: 1.18

## 2024-05-03 NOTE — ED PROVIDER NOTES
ED Provider Note    CHIEF COMPLAINT  Chief Complaint   Patient presents with    Fall    Hip Pain       EXTERNAL RECORDS REVIEWED  Other I reviewed an office visit note from 30 April of this year with the patient's primary care provider.  It was noted the patient does have a history of lower extremity wounds with lymphedema and recurrent falls.    HPI/ROS    Karine Ovalle is a 58 y.o. female who presents with left hip, low back, and left shoulder pain.  As mentioned above the patient does have a history of recurrent falls.  She had a fall this morning injuring her left hip, back, and shoulder.  She did not strike her head.  She does not have any chest pain or abdominal pain.  She does not have any functional loss of her extremities.  The pain seems to be more in the left lateral aspect of the lumbar region as well as the left lateral aspect of the hip.  She also has significant discomfort with any movement at the left shoulder.  She does not have any difficulty with breathing or chest pain.    PAST MEDICAL HISTORY   has a past medical history of Administrative encounter- RTC ACCESS/ADA PARATRANSIT ELIGIBILITY (06/04/2019), Anemia, Anemia, chronic disease (06/02/2022), Arthritis, At risk for falls, Back pain, Bronchitis, Cellulitis of right lower extremity (12/31/2021), Chickenpox, Chronic back pain, Cough, Dental disorder, Edema (12/13/2018), Frequent headaches, History of gastric bypass (04/25/2012), Hoarseness, persistent, Hypokalemia (06/07/2018), Hyponatremia (05/18/2012), Left hip pain (09/18/2018), Leg edema (08/09/2021), Leukocytosis (04/03/2022), Microcytic anemia- postop THR 2/2019 (12/13/2018), Mild intermittent asthma with acute exacerbation (04/25/2012), Morning headache, Multiple closed fractures of facial bone (HCC) (06/01/2022), Near syncope (06/01/2022), Obesity, Open wound of right lower extremity- needs wound vac (04/04/2022), Pain (12/04/2018), Pain (02/04/2019), Primary osteoarthritis of  both hips- dr nowak; left THR done feb 6, 2019; right THR 3/2018 (06/07/2018), Rhinitis, S/P hip replacement, bilateral (02/13/2019), Sepsis (HCC) (04/03/2022), Serum gamma globulin increased (06/08/2022), Shortness of breath, Swelling of lower extremity, Tonsillitis, Toothache, Urinary incontinence, and Wheezing.    SURGICAL HISTORY   has a past surgical history that includes gastric bypass laparoscopic (2002); abdominal exploration; plastic surgery (2004); debridement (05/17/2012); appendectomy laparoscopic (03/21/2013); knee arthroplasty total (Right, 10/20/2015); mammoplasty augmentation (Bilateral, 2004); hip arthroplasty total (Right, 03/20/2018); cervical disk and fusion anterior (12/05/2018); corpectomy (12/05/2018); cervical fusion posterior (12/07/2018); cervical laminectomy posterior (12/07/2018); hip arthroplasty total (Left, 02/13/2019); other; other; Sleeve,Jack Vaso Thigh; primary c section; hip replacement, total; tonsillectomy; appendectomy; and arthroscopy, knee.    FAMILY HISTORY  Family History   Problem Relation Age of Onset    Diabetes Mother     Heart Disease Mother        SOCIAL HISTORY  Social History     Tobacco Use    Smoking status: Never     Passive exposure: Past    Smokeless tobacco: Never    Tobacco comments:     Allergies  to cigarette  smoke, creates shortness breathe   Vaping Use    Vaping Use: Never used   Substance and Sexual Activity    Alcohol use: Yes     Alcohol/week: 1.2 oz     Types: 2 Glasses of wine per week     Comment: 3-4 per week; pt stops alcohol use 1-week ago    Drug use: No     Frequency: 4.0 times per week    Sexual activity: Never       CURRENT MEDICATIONS  Home Medications    **Home medications have not yet been reviewed for this encounter**         ALLERGIES  Allergies   Allergen Reactions    Tobacco [Nicotiana Tabacum] Shortness of Breath     Cigarette smoke causes SOB, rispatory issues    Other Environmental      Cigarette smoke  Other reaction(s): Not  "available       PHYSICAL EXAM  VITAL SIGNS: /69   Pulse (!) 112   Temp 36.1 °C (97 °F) (Temporal)   Resp 18   Ht 1.6 m (5' 3\")   Wt 88.1 kg (194 lb 3.6 oz)   LMP  (LMP Unknown)   SpO2 93%   BMI 34.41 kg/m²    In general the patient appears chronically ill    HEENT atraumatic    Cervical and thoracic spine has no midline tenderness nor step-offs.  The patient does have some midline discomfort in the lumbar region    Pulmonary the patient's lungs were clear to auscultation bilaterally with no pain with AP or lateral compression    Cardiovascular S1-S2 with a tachycardic rate    Extremities patient has diffuse discomfort throughout the left shoulder without obvious deformities.  She does have full range of motion of the shoulder other with significant discomfort.  She has a normal left elbow and left wrist exam.  The patient also has tenderness to the left lateral aspect of the hip.  She does have dressings in compression stockings to the lower extremities for open wounds that are chronic      RADIOLOGY/PROCEDURES   CT-PELVIS W/O PLUS RECONS   Final Result      1.  No evidence of pelvic or proximal femoral fracture.      2.  Bilateral hip arthroplasties. No evidence of fracture or dislocation of the arthroplasty components.      3.  Hemisacralization at the lumbosacral junction with pseudoarticulation on the left.      CT-LSPINE W/O PLUS RECONS   Final Result      1.  No evidence of fracture of the lumbar spine.      2.  Severe multilevel degenerative disc disease and facet degeneration.      3.  Hemisacralization at the lumbosacral junction with a left sided pseudoarticulation.      DX-SHOULDER 2+ LEFT   Final Result      No evidence of acute fracture or dislocation.      Mild degenerative change              COURSE & MEDICAL DECISION MAKING  This is a 58-year-old female who presents the emergency department with left shoulder pain, left hip pain, and low back pain after a fall.  CT imaging of the pelvis " and lumbar spine was performed and there is acute traumatic change.  I also performed plain films of the left shoulder there is no acute fracture.  I suspect the patient has a contusion to the left hip as well as the left shoulder.  She is on a complex pain regimen with Percocet and morphine and she will continue her narcotics.  I also encouraged anti-inflammatories and the patient did receive Motrin.  I like her to recheck with her primary care doctor in 5 to 7 days and return to the emergency department if she is acutely worse.    FINAL DIAGNOSIS  1.  Left shoulder contusion  2.  Left hip contusion  3.  Lumbar strain    Disposition  The patient will be discharged in stable condition       Electronically signed by: Jatin Arguelles M.D., 5/3/2024 1:05 PM

## 2024-05-03 NOTE — ED TRIAGE NOTES
Pt ambulates to triage following a GLF earlier today. She states that she landed on her left side and now has increased pain in her left hip, but reports both hips hurt. Denies head injury. Denies anticoagulants.

## 2024-05-03 NOTE — DISCHARGE INSTRUCTIONS
Take your pain medication as prescribed.  Take Motrin and Tylenol as well as needed for pain control.  Follow-up with your doctor in 3 to 5 days and return to the emergency department if you are acutely worse.

## 2024-05-03 NOTE — ED NOTES
ERP at bedside. Pt agrees with plan of care discussed by ERP. Lalita in low position, side rail up for pt safety. Call light within reach. Plan of care on-going

## 2024-05-07 ENCOUNTER — OFFICE VISIT (OUTPATIENT)
Dept: WOUND CARE | Facility: MEDICAL CENTER | Age: 59
End: 2024-05-07
Payer: COMMERCIAL

## 2024-05-07 DIAGNOSIS — S81.801D OPEN WOUND OF RIGHT LOWER EXTREMITY, SUBSEQUENT ENCOUNTER: ICD-10-CM

## 2024-05-14 ENCOUNTER — OFFICE VISIT (OUTPATIENT)
Dept: WOUND CARE | Facility: MEDICAL CENTER | Age: 59
End: 2024-05-14
Attending: INTERNAL MEDICINE
Payer: COMMERCIAL

## 2024-05-14 VITALS
RESPIRATION RATE: 17 BRPM | TEMPERATURE: 96.8 F | DIASTOLIC BLOOD PRESSURE: 70 MMHG | OXYGEN SATURATION: 98 % | SYSTOLIC BLOOD PRESSURE: 118 MMHG | HEART RATE: 92 BPM

## 2024-05-14 DIAGNOSIS — S81.801D OPEN WOUND OF RIGHT LOWER EXTREMITY, SUBSEQUENT ENCOUNTER: ICD-10-CM

## 2024-05-14 DIAGNOSIS — S81.802D OPEN WOUND OF LEFT LOWER EXTREMITY, SUBSEQUENT ENCOUNTER: ICD-10-CM

## 2024-05-14 DIAGNOSIS — E66.9 OBESITY (BMI 30-39.9): ICD-10-CM

## 2024-05-14 DIAGNOSIS — G89.4 CHRONIC PAIN SYNDROME: ICD-10-CM

## 2024-05-14 DIAGNOSIS — I89.0 LYMPHEDEMA: ICD-10-CM

## 2024-05-14 PROCEDURE — 3074F SYST BP LT 130 MM HG: CPT | Performed by: NURSE PRACTITIONER

## 2024-05-14 PROCEDURE — 3078F DIAST BP <80 MM HG: CPT | Performed by: NURSE PRACTITIONER

## 2024-05-14 PROCEDURE — 11042 DBRDMT SUBQ TIS 1ST 20SQCM/<: CPT | Performed by: NURSE PRACTITIONER
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
## 2024-05-15 ENCOUNTER — PATIENT MESSAGE (OUTPATIENT)
Dept: MEDICAL GROUP | Age: 59
End: 2024-05-15
Payer: COMMERCIAL

## 2024-05-15 NOTE — PROGRESS NOTES
[facility]   [facility]   [address]   [address]   [place] [address]   [phone] Fax: [phone]        Duration of Supply Order: 90 Days  Dispense as written.     Wound 02/13/24 Venous Ulcer Leg Medial Right (Active)   Wound Image    05/14/24 1715   Site Assessment Pink;Red;Yellow 05/14/24 1715   Periwound Assessment Hemosiderin Staining;Edema;Fragile 05/14/24 1715   Margins Attached edges 05/14/24 1715   Closure Secondary intention 02/27/24 1600   Drainage Amount Moderate 05/14/24 1715   Drainage Description Serosanguineous 05/14/24 1715   Treatments Cleansed;Topical Lidocaine;Provider debridement 05/14/24 1715   Wound Cleansing Hypochlorus Acid 05/14/24 1715   Periwound Protectant No-sting Skin Prep;Skin Moisturizer 05/14/24 1715   Dressing Changed Changed 04/30/24 1700   Dressing Cleansing/Solutions Not Applicable 05/14/24 1715   Dressing Options Hydrofiber Silver;Silicone Adhesive Foam;Tubigrip 05/14/24 1715   Dressing Change/Treatment Frequency Weekly, and As Needed 05/14/24 1715   Wound Team Following Weekly 04/30/24 1700   Non-staged Wound Description Full thickness 05/14/24 1715   Wound Length (cm) 0.7 cm 05/14/24 1715   Wound Width (cm) 1.6 cm 05/14/24 1715   Wound Depth (cm) 0.1 cm 05/14/24 1715   Wound Surface Area (cm^2) 1.12 cm^2 05/14/24 1715   Wound Volume (cm^3) 0.112 cm^3 05/14/24 1715   Post-Procedure Length (cm) 0.9 cm 05/14/24 1715   Post-Procedure Width (cm) 1.6 cm 05/14/24 1715   Post-Procedure Depth (cm) 0.1 cm 05/14/24 1715   Post-Procedure Surface Area (cm^2) 1.44 cm^2 05/14/24 1715   Post-Procedure Volume (cm^3) 0.144 cm^3 05/14/24 1715   Wound Healing % 83 05/14/24 1715   Tunneling (cm) 0 cm 05/14/24 1715   Undermining (cm) 0 cm 05/14/24 1715   Wound Odor None 05/14/24 1715   Exposed Structures None 05/14/24 1715            PROCEDURE:     2% topical Lidocaine applied prior to debridement with ~5 minute dwell time. Conservative sharp  wound debridement using curette to remove ~1cm² nonviable tissue from RLE  wound bed. Patient tolerated well; denied pain.

## 2024-05-15 NOTE — PATIENT INSTRUCTIONS
-Keep dressings clean and dry. Change dressings every 3-4 days, and if they become over saturated, soiled or fall off.     -If you need to change your dressings at home: Wash your wound with normal saline, wound cleanser, or unscented soap and water prior to applying your new dressings. Please avoid cleansing with hydrogen peroxide or rubbing alcohol. Hydrogen peroxide and rubbing alcohol are toxic to new tissue and skin cells.    -Avoid prolonged standing or sitting without elevating your legs.    -Remove your compression garments if you have severe pain, severe swelling, numbness, color change, or temperature change in your toes. If you need to remove your compression garments, do so by unrolling them. Do not cut the compression garments off, this is to prevent cutting yourself on accident.    -Should you experience any significant changes in your wound(s), such as signs of infection (increasing redness, swelling, localized heat, increased pain, fever > 101 F, chills) or have any questions regarding your home care instructions, please contact the wound center at [phone]. If after hours, contact your primary care physician or go to the hospital emergency room.     -If you are 5 or more minutes late for an appointment, we reserve the right to cancel and reschedule that appointment. Additionally, if you are habitually late or not showing (3 late cancellations and/or no shows), we reserve the right to cancel your remaining appointments and it will be your responsibility to obtain a new referral if services are still needed.

## 2024-05-16 ENCOUNTER — PATIENT MESSAGE (OUTPATIENT)
Dept: MEDICAL GROUP | Age: 59
End: 2024-05-16
Payer: COMMERCIAL

## 2024-05-16 DIAGNOSIS — E66.9 OBESITY (BMI 30-39.9): ICD-10-CM

## 2024-05-16 DIAGNOSIS — M54.42 CHRONIC BILATERAL LOW BACK PAIN WITH BILATERAL SCIATICA: ICD-10-CM

## 2024-05-16 DIAGNOSIS — G89.29 CHRONIC BILATERAL LOW BACK PAIN WITH BILATERAL SCIATICA: ICD-10-CM

## 2024-05-16 DIAGNOSIS — M50.30 DDD (DEGENERATIVE DISC DISEASE), CERVICAL: ICD-10-CM

## 2024-05-16 DIAGNOSIS — M54.41 CHRONIC BILATERAL LOW BACK PAIN WITH BILATERAL SCIATICA: ICD-10-CM

## 2024-05-16 DIAGNOSIS — M51.36 DDD (DEGENERATIVE DISC DISEASE), LUMBAR: ICD-10-CM

## 2024-05-16 RX ORDER — PHENTERMINE HYDROCHLORIDE 37.5 MG/1
37.5 CAPSULE ORAL EVERY MORNING
Qty: 90 CAPSULE | Refills: 0 | OUTPATIENT
Start: 2024-05-16 | End: 2024-08-14

## 2024-05-16 RX ORDER — GABAPENTIN 800 MG/1
800 TABLET ORAL 3 TIMES DAILY
Qty: 270 TABLET | Refills: 0 | Status: SHIPPED | OUTPATIENT
Start: 2024-05-16 | End: 2024-05-30

## 2024-05-16 RX ORDER — METHOCARBAMOL 500 MG/1
TABLET, FILM COATED ORAL
Qty: 240 TABLET | Refills: 0 | Status: SHIPPED | OUTPATIENT
Start: 2024-05-16 | End: 2024-05-17 | Stop reason: SDUPTHER

## 2024-05-16 RX ORDER — PHENTERMINE HYDROCHLORIDE 37.5 MG/1
37.5 CAPSULE ORAL EVERY MORNING
Qty: 90 CAPSULE | Refills: 0 | Status: SHIPPED | OUTPATIENT
Start: 2024-05-16 | End: 2024-05-17 | Stop reason: SDUPTHER

## 2024-05-17 RX ORDER — PHENTERMINE HYDROCHLORIDE 37.5 MG/1
37.5 CAPSULE ORAL EVERY MORNING
Qty: 90 CAPSULE | Refills: 0 | Status: SHIPPED | OUTPATIENT
Start: 2024-05-17 | End: 2024-05-30

## 2024-05-17 RX ORDER — METHOCARBAMOL 500 MG/1
TABLET, FILM COATED ORAL
Qty: 240 TABLET | Refills: 0 | Status: SHIPPED | OUTPATIENT
Start: 2024-05-17

## 2024-05-17 RX ORDER — GABAPENTIN 800 MG/1
800 TABLET ORAL 3 TIMES DAILY
Qty: 270 TABLET | Refills: 0 | Status: SHIPPED | OUTPATIENT
Start: 2024-05-17 | End: 2024-08-15

## 2024-05-21 ENCOUNTER — OFFICE VISIT (OUTPATIENT)
Dept: WOUND CARE | Facility: MEDICAL CENTER | Age: 59
End: 2024-05-21
Attending: INTERNAL MEDICINE
Payer: COMMERCIAL

## 2024-05-21 DIAGNOSIS — S81.801D OPEN WOUND OF RIGHT LOWER EXTREMITY, SUBSEQUENT ENCOUNTER: ICD-10-CM

## 2024-05-21 PROCEDURE — 99999 PR NO CHARGE: CPT | Performed by: NURSE PRACTITIONER

## 2024-05-22 ENCOUNTER — NON-PROVIDER VISIT (OUTPATIENT)
Dept: WOUND CARE | Facility: MEDICAL CENTER | Age: 59
End: 2024-05-22
Attending: INTERNAL MEDICINE
Payer: COMMERCIAL

## 2024-05-22 NOTE — PROGRESS NOTES
Patient is a late cancellation for appointment today. Caridad spoke with patient and she was rescheduled to tomorrow 5/23/24. She was advised that if she no shows or late cancels, she will be discharged from the clinic.

## 2024-05-23 ENCOUNTER — NON-PROVIDER VISIT (OUTPATIENT)
Dept: WOUND CARE | Facility: MEDICAL CENTER | Age: 59
End: 2024-05-23
Attending: INTERNAL MEDICINE
Payer: COMMERCIAL

## 2024-05-23 VITALS
SYSTOLIC BLOOD PRESSURE: 100 MMHG | RESPIRATION RATE: 18 BRPM | TEMPERATURE: 97.4 F | HEART RATE: 102 BPM | DIASTOLIC BLOOD PRESSURE: 60 MMHG | OXYGEN SATURATION: 94 %

## 2024-05-23 NOTE — PATIENT INSTRUCTIONS
-Keep dressings clean and dry. Change dressings every 3-4 days, and if they become over saturated, soiled or fall off.     -If you need to change your dressings at home: Wash your wound with normal saline, wound cleanser, or unscented soap and water prior to applying your new dressings. Please avoid cleansing with hydrogen peroxide or rubbing alcohol. Hydrogen peroxide and rubbing alcohol are toxic to new tissue and skin cells.    -Avoid prolonged standing or sitting without elevating your legs.    -Remove your compression garments if you have severe pain, severe swelling, numbness, color change, or temperature change in your toes. If you need to remove your compression garments, do so by unrolling them. Do not cut the compression garments off, this is to prevent cutting yourself on accident.    -Should you experience any significant changes in your wound(s), such as signs of infection (increasing redness, swelling, localized heat, increased pain, fever > 101 F, chills) or have any questions regarding your home care instructions, please contact the wound center at [phone]. If after hours, contact your primary care physician or go to the hospital emergency room.     -If you are 5 or more minutes late for an appointment, we reserve the right to cancel and reschedule that appointment. Additionally, if you are habitually late or not showing (3 late cancellations and/or no shows), we reserve the right to cancel your remaining appointments and it will be your responsibility to obtain a new referral if services are still needed.

## 2024-05-23 NOTE — PROCEDURES
Non selective debridement with normal saline and gauze to remove loose non viable tissue from wound bed.

## 2024-05-28 ENCOUNTER — OFFICE VISIT (OUTPATIENT)
Dept: WOUND CARE | Facility: MEDICAL CENTER | Age: 59
End: 2024-05-28
Attending: INTERNAL MEDICINE
Payer: COMMERCIAL

## 2024-05-28 VITALS
OXYGEN SATURATION: 98 % | DIASTOLIC BLOOD PRESSURE: 90 MMHG | TEMPERATURE: 97.5 F | SYSTOLIC BLOOD PRESSURE: 130 MMHG | HEART RATE: 104 BPM | RESPIRATION RATE: 18 BRPM

## 2024-05-28 DIAGNOSIS — S81.802D OPEN WOUND OF LEFT LOWER EXTREMITY, SUBSEQUENT ENCOUNTER: ICD-10-CM

## 2024-05-28 DIAGNOSIS — I89.0 LYMPHEDEMA: ICD-10-CM

## 2024-05-28 DIAGNOSIS — G89.4 CHRONIC PAIN SYNDROME: ICD-10-CM

## 2024-05-28 DIAGNOSIS — S81.801D OPEN WOUND OF RIGHT LOWER EXTREMITY, SUBSEQUENT ENCOUNTER: ICD-10-CM

## 2024-05-28 DIAGNOSIS — E66.9 OBESITY (BMI 30-39.9): ICD-10-CM

## 2024-05-28 PROCEDURE — 99213 OFFICE O/P EST LOW 20 MIN: CPT | Mod: 25 | Performed by: NURSE PRACTITIONER

## 2024-05-28 PROCEDURE — 3080F DIAST BP >= 90 MM HG: CPT | Performed by: NURSE PRACTITIONER

## 2024-05-28 PROCEDURE — 3075F SYST BP GE 130 - 139MM HG: CPT | Performed by: NURSE PRACTITIONER

## 2024-05-28 PROCEDURE — 11042 DBRDMT SUBQ TIS 1ST 20SQCM/<: CPT | Performed by: NURSE PRACTITIONER

## 2024-05-28 NOTE — CARE PLAN
"Problem: Bowel/Gastric:  Goal: Normal bowel function is maintained or improved  Pt is continent of bowel and having daily BMs. Pt refused 2100 bowel program. Will request to switch scheduled suppository to prn from MD, since pt has been refusing for the past 7 nights. Pt denies any abdominal discomfort. Will continue to monitor.     Problem: Urinary Elimination:  Goal: Ability to reestablish a normal urinary elimination pattern will improve  Pt remains incontinent of bladder. Pt states, \" by the time I call you guys, you are always late and I end up wetting myself.\" Staff has been answering pt's call light in a timely manner < 5 mins. Pt denies any dysuria or flank pain. Will continue to monitor.      Problem: Mobility  Goal: Risk for activity intolerance will decrease  Pt is a min/mod assist with transfers. Pt appears very unmotivated to get out of bed to use toilet. Pt insisted staff to put BSC in her room, \" my left hip doesn't even move and it's broken. I need a bed side commode in here.\" Educated and reminded pt she is in rehab and that it would benefit her to get out of bed and use the toilet. Pt continues to request at BSC in room.       "
"Problem: Communication  Goal: The ability to communicate needs accurately and effectively will improve  Outcome: PROGRESSING AS EXPECTED  Plan of care for the day discussed this morning with pt. All questions and concerns answered at this time. Pt perseverates on pain medications and pain.     Problem: Urinary Elimination:  Goal: Ability to reestablish a normal urinary elimination pattern will improve  Outcome: PROGRESSING AS EXPECTED  Pt states she has incontinence at baseline at home due to her hips and not being able to move fast enough. Pt stated when her last hip was replace and before her other one \"went bad\" she had no issue making it to the toilet \"on time\"    Pt wanted staff to do toileting hygiene, pt encouraged to attempt own hygiene with the supervision of staff for safety and educated that she may not have anyone doing this for her when she gets home so she should practice as if she doesn't have help. Pt was able to perform own hygiene.      "
"Problem: Communication  Goal: The ability to communicate needs accurately and effectively will improve  Pt is a/o x 4. Able to make needs known, uses call light for assistance.     Problem: Bowel/Gastric:  Goal: Normal bowel function is maintained or improved  Pt is on a daily 2100 bowel program and refused suppository. Pt stated, \"I pooped twice today, I don't need that.\" Denies any constipation or abdominal discomfort. Abdomen is soft and non-tender with normoactive bowel sounds.     Problem: Pain Management  Goal: Pain level will decrease to patient's comfort goal  Pt is on scheduled pain meds. Pt refused prn oxycodone at [facility]. Pt states, \" I don't have any pain right now.\" In no distress. Will continue to monitor.     Problem: Urinary Elimination:  Goal: Ability to reestablish a normal urinary elimination pattern will improve  Pt is continent/incontinent of bladder. Denies any dysuria or flank pain.       "
"Problem: PT-Long Term Goals  Goal: LTG-By discharge, patient will ambulate  1) Individualized goal:  150ft x 1, FWW, SPV  2) Interventions:  PT Group Therapy, PT E Stim Attended, PT Gait Training, PT Therapeutic Exercises, PT Neuro Re-Ed/Balance, PT Therapeutic Activity, PT Manual Therapy and PT Evaluation.     Outcome: NOT MET  Pt refused d/t complaints of R hip pain  Goal: LTG-By discharge, patient will perform home exercise program  1) Individualized goal:Pt will perform HEP for B LE strengthening, mod I  2) Interventions:  PT Group Therapy, PT E Stim Attended, PT Gait Training, PT Therapeutic Exercises, PT Neuro Re-Ed/Balance, PT Therapeutic Activity, PT Manual Therapy and PT Evaluation.     Outcome: NOT MET  Pt limited by motivation, adherence to program   Goal: LTG-By discharge, patient will ambulate up/down 4-6 stairs  1) Individualized goal:  4x1 6\" steps, unilateral HR, SBA  2) Interventions:  PT Group Therapy, PT E Stim Attended, PT Gait Training, PT Therapeutic Exercises, PT Neuro Re-Ed/Balance, PT Therapeutic Activity, PT Manual Therapy and PT Evaluation.     Outcome: NOT MET  Pt limited by R hip pain  Goal: LTG-By discharge, patient will transfer in/out of a car  1) Individualized goal: Pt will transfer in/out of a car with FWW and SBA  2) Interventions:  PT Group Therapy, PT E Stim Attended, PT Gait Training, PT Therapeutic Exercises, PT Neuro Re-Ed/Balance, PT Therapeutic Activity, PT Manual Therapy and PT Evaluation.     Outcome: NOT MET  Pt limited by R hip pain      "
"Problem: Pain Management  Goal: Pain level will decrease to patient's comfort goal  Outcome: PROGRESSING AS EXPECTED  Pt complains of anterior/posterior neck pain, comp;ains of numbness/tingling in BUE and BLE, pt skin on arms and legs sensitive to touch, Megan 10mg PO adm at 1248pm, Tylenol 1000mg PO and Robaxin 1000mg PO at 1248pm.  Pt states complains of throbbing neck pain 9 of 10 levels, pt able to sit up in w/c at bedside to eat lunch.   Pt also complains of left hip pain, states she has difficulty with stand pivot transfers due to left hip \"problemds\", \" I am having my left hip replaced in 6 weeks.\"  Per pt.Will re assess pain levels Q 4 hours and PRN and provide interventions for comfort as indicated.    Problem: Urinary Elimination:  Goal: Ability to reestablish a normal urinary elimination pattern will improve  Outcome: PROGRESSING SLOWER THAN EXPECTED  Pt states she has history of urgency and urinary incontinence at home, states her left hip pain interferes with transfers from bed to toilet, pt taking diuretics, pt had continent void on toilet when arrived at Rehab, pt wearing diaper, will encourage PO fluids for hydration, will time void pt Q 3 hours while awake to promote urinary continence.      "
"Problem: Safety  Goal: Will remain free from falls    Intervention: Implement fall precautions  Use of call light encouraged to make needs known.Bed in low position, non skid socks/shoes on when out of bed.Call light within reach.Will continue to monitor and assess needs and safety.      Problem: Bowel/Gastric:  Goal: Normal bowel function is maintained or improved    Intervention: Educate patient and significant other/support system about signs and symptoms of constipation and interventions to implement  Pt refused scheduled suppository.\" I went the natural way, I don't need suppository.\" verbalized by pt.Education given on the importance of bowel program to no avail.[facility] 12/25.Will continue to monitor and assess for s/sx of constipation.      Problem: Pain Management  Goal: Pain level will decrease to patient's comfort goal    Intervention: Follow pain managment plan developed in collaboration with patient and Interdisciplinary Team  Medicated per request for pain with good effect.Repositioned with pillows for comfort.Will continue to monitor and assess pain level and medicate as needed.        "
"Problem: Skin Integrity  Goal: Risk for impaired skin integrity will decrease  Outcome: PROGRESSING SLOWER THAN EXPECTED  Pt has intact, dry, small, red blisters on left buttock, area covered with biatain, pt placed on contact isolation.  Pt denies pain at site, she states \"the nursing staff at the other hospital wiped my bottom with the wipes with the purple lid and it smelled very strong and burned my skin per pt\".Will monitor.      "
Problem: Bowel/Gastric:  Goal: Normal bowel function is maintained or improved    Intervention: Educate patient and significant other/support system about diet, fluid intake, medications and activity to promote bowel function  Last BM - 12/9 per pt. No PRN bowel meds ordered for pt. Charge RN supervisor aware. Will endorsed to day shift RN        
Problem: Bowel/Gastric:  Goal: Normal bowel function is maintained or improved   12/11/18 1210   OTHER   Last BM 12/08/18  (per report)     Will give laxatives in AM. Continent of bowels. No abdominal distention noted. Pt denies pain on palpation. Will monitor.     Problem: Pain Management  Goal: Pain level will decrease to patient's comfort goal  Pt reported neck and left hip pain with pain level of 8- 10/10. Medicated with MS contin and Tylenol with good relief. Repositioned pt comfortably in bed. Maintained room conducive to sleep and rest. Reassessed pt and found sleeping with calm unlabored breathing. Will continue to monitor pt      
Problem: Bowel/Gastric:  Goal: Normal bowel function is maintained or improved  Outcome: PROGRESSING AS EXPECTED  Patient refused night suppository for bowel program but patient was continent and had a large formed brown stool in the bathroom.     Problem: Skin Integrity  Goal: Risk for impaired skin integrity will decrease  Outcome: PROGRESSING AS EXPECTED  Patient complained of itching on lower extremities. Lotion applied and sequential compression device was applied. An hour later patient stated that she felt better. Incision site is clean dry and intact. Island dressing on posterior incision is clean, dry, and intact. Will continue to monitor.      
Problem: Bowel/Gastric:  Goal: Will not experience complications related to bowel motility    Intervention: Assess baseline bowel pattern  Patient having regular bowel movements; last BM 12/22/18.  Denies s/s constipation; bowel meds available if needed.  Will continue to monitor.        
Problem: Bowel/Gastric:  Goal: Will not experience complications related to bowel motility  Outcome: PROGRESSING AS EXPECTED  Patient states that while she was home she would have bowel movements every day then it changed to every other day then to once every three days. Patient states that at home she took ex-lax and not stool softeners. Educated patient on the bowel program. Patient was agreeable to suppository. Patient had a large bowel movement and was able to defecate on her own. Rectal vault was assessed and digital stimulation was attempted, no more stool was produced.     Problem: Pain Management  Goal: Pain level will decrease to patient's comfort goal  Outcome: PROGRESSING AS EXPECTED  Patient stated her pain was 10/10. Medicated per MAR. C-collar was adjusted and patient was repositioned in bed. Will continue to monitor.      
Problem: Communication  Goal: The ability to communicate needs accurately and effectively will improve  Outcome: PROGRESSING AS EXPECTED  Plan of care for the day discussed this morning with pt. All questions and concerns answered at this time. Pt reports sleeping well last night. Will continue to monitor      Problem: Pain Management  Goal: Pain level will decrease to patient's comfort goal  Outcome: PROGRESSING AS EXPECTED  Pt continues to request Oxy10 with MS Contin, pt educated of the PRN vs Scheduled medication. Pt would like more pain medication, will talk to MD      
Problem: Communication  Goal: The ability to communicate needs accurately and effectively will improve  Outcome: PROGRESSING AS EXPECTED  Plan of care for the day discussed this morning with pt. All questions and concerns answered at this time. Pt reports sleeping well last night. Will continue to monitor      Problem: Pain Management  Goal: Pain level will decrease to patient's comfort goal  Outcome: PROGRESSING AS EXPECTED  Pt states her pain is always and 8. Pt intermittently falls asleep when not actively talking. Pt medicated as needed for pain along with scheduled pain meds      
Problem: Communication  Goal: The ability to communicate needs accurately and effectively will improve  Outcome: PROGRESSING AS EXPECTED  Plan of care for the day discussed this morning with pt. All questions and concerns answered at this time. Pt reports sleeping well last night. Will continue to monitor    Problem: Pain Management  Goal: Pain level will decrease to patient's comfort goal  Outcome: PROGRESSING AS EXPECTED  Pain not well managed, pt states she doesn't get below an 8      
Problem: Communication  Goal: The ability to communicate needs accurately and effectively will improve  Patient alert and oriented x 4 with clear speech and ability to convey needs/wants to care team.    Problem: Safety  Goal: Will remain free from injury  Patient uses call light appropriately for assistance as needed/wanted. Bed locked, in lowest position.      
Problem: Communication  Goal: The ability to communicate needs accurately and effectively will improve  Patient alert and oriented x 4 with clear speech and ability to convey needs/wants to care team.    Problem: Safety  Goal: Will remain free from injury  Patient uses call light appropriately for assistance as needed/wanted. Bed locked, in lowest position.      
Problem: Communication  Goal: The ability to communicate needs accurately and effectively will improve  Patient is able to communicate needs with no difficulty.  Patient is able to understand with no difficulty. Will continue to monitor.     Problem: Safety  Goal: Will remain free from injury  Pt uses call light when assistance is needed or for transfers.    Problem: Pain Management  Goal: Pain level will decrease to patient's comfort goal   12/12/18 1646   OTHER   Pain Scale 0 - 10  8   Non Verbal Scale  Calm   Comfort Goal Comfort with Movement;Perform Activity;Sleep Comfortably     Pt medicated per mar with adequate results.       
Problem: Grooming  Goal: STG-Within one week, patient will complete grooming  1) Individualized Goal:  With setup and SBA with verbal cues while seated.  2) Interventions:  OT E Stim Attended, OT Self Care/ADL, OT Community Reintegration, OT Manual Ther Technique, OT Neuro Re-Ed/Balance, OT Sensory Int Techniques, OT Therapeutic Activity, OT Aquatic Therapy, OT Evaluation and OT Therapeutic Exercise     Outcome: MET Date Met: 12/24/18  Setup/SBA    Problem: Bathing  Goal: STG-Within one week, patient will bathe  1) Individualized Goal:  Body with max A using AE/AD/techniques as needed.  2) Interventions:  OT E Stim Attended, OT Self Care/ADL, OT Community Reintegration, OT Manual Ther Technique, OT Neuro Re-Ed/Balance, OT Sensory Int Techniques, OT Therapeutic Activity, OT Aquatic Therapy, OT Evaluation and OT Therapeutic Exercise   Outcome: MET Date Met: 12/24/18  Max A using AE/AD/techniques and increased time    Problem: Dressing  Goal: STG-Within one week, patient will dress LB  1) Individualized Goal:  With max A using AE/AD/techniques as needed.  2) Interventions:  OT E Stim Attended, OT Self Care/ADL, OT Community Reintegration, OT Manual Ther Technique, OT Neuro Re-Ed/Balance, OT Sensory Int Techniques, OT Therapeutic Activity, OT Aquatic Therapy, OT Evaluation and OT Therapeutic Exercise   Outcome: MET Date Met: 12/24/18  Max A with AE/cues and increased time      
Problem: Grooming  Goal: STG-Within one week, patient will complete grooming  1) Individualized Goal:  With setup and SBA with verbal cues while seated.  2) Interventions:  OT E Stim Attended, OT Self Care/ADL, OT Community Reintegration, OT Manual Ther Technique, OT Neuro Re-Ed/Balance, OT Sensory Int Techniques, OT Therapeutic Activity, OT Aquatic Therapy, OT Evaluation and OT Therapeutic Exercise     Outcome: NOT MET  Min A    Problem: Bathing  Goal: STG-Within one week, patient will bathe  1) Individualized Goal:  Body with max A using AE/AD/techniques as needed.  2) Interventions:  OT E Stim Attended, OT Self Care/ADL, OT Community Reintegration, OT Manual Ther Technique, OT Neuro Re-Ed/Balance, OT Sensory Int Techniques, OT Therapeutic Activity, OT Aquatic Therapy, OT Evaluation and OT Therapeutic Exercise   Outcome: NOT MET  Total assist    Problem: Dressing  Goal: STG-Within one week, patient will dress LB  1) Individualized Goal:  With max A using AE/AD/techniques as needed.  2) Interventions:  OT E Stim Attended, OT Self Care/ADL, OT Community Reintegration, OT Manual Ther Technique, OT Neuro Re-Ed/Balance, OT Sensory Int Techniques, OT Therapeutic Activity, OT Aquatic Therapy, OT Evaluation and OT Therapeutic Exercise   Outcome: NOT MET  Total assist      
Problem: Mobility  Goal: STG-Within one week, patient will propel wheelchair community  1) Individualized goal: 50ft x 1, SPV  2) Interventions:  PT Group Therapy, PT E Stim Attended, PT Gait Training, PT Therapeutic Exercises, PT Neuro Re-Ed/Balance, PT Therapeutic Activity, PT Manual Therapy and PT Evaluation.   Outcome: MET Date Met: 12/24/18    Goal: STG-Within one week, patient will ambulate household distance  1) Individualized goal:  25ft x 1, FWW, CGA  2) Interventions:  PT Group Therapy, PT E Stim Attended, PT Gait Training, PT Therapeutic Exercises, PT Neuro Re-Ed/Balance, PT Therapeutic Activity, PT Manual Therapy and PT Evaluation.   Outcome: NOT MET  Limited by pain/endurance  Goal: STG-Within one week, patient will ascend and descend four to six stairs  1) Individualized goal:  3 x 1 stairs with unilateral HR, mod A  2) Interventions:  PT Group Therapy, PT E Stim Attended, PT Gait Training, PT Therapeutic Exercises, PT Neuro Re-Ed/Balance, PT Therapeutic Activity, PT Manual Therapy and PT Evaluation.   Outcome: NOT MET  Pt refused d/t L hip pain    Problem: Mobility Transfers  Goal: STG-Within one week, patient will perform bed mobility  1) Individualized goal:  [place] x 1 with AD as needed  2) Interventions:  PT Group Therapy, PT E Stim Attended, PT Gait Training, PT Therapeutic Exercises, PT Neuro Re-Ed/Balance, PT Therapeutic Activity, PT Manual Therapy and PT Evaluation.     Outcome: NOT MET  Pt requires modA d/t pain, limited activity tolerance/ motivation  Goal: STG-Within one week, patient will transfer bed to chair  1) Individualized goal:  Pt will transfer bed to chair with FWW, SBA  2) Interventions:  PT Group Therapy, PT E Stim Attended, PT Gait Training, PT Therapeutic Exercises, PT Neuro Re-Ed/Balance, PT Therapeutic Activity, PT Manual Therapy and PT Evaluation.     Outcome: MET Date Met: 12/24/18        
Problem: Mobility  Goal: STG-Within one week, patient will propel wheelchair community  1) Individualized goal: 50ft x 1, SPV  2) Interventions:  PT Group Therapy, PT E Stim Attended, PT Gait Training, PT Therapeutic Exercises, PT Neuro Re-Ed/Balance, PT Therapeutic Activity, PT Manual Therapy and PT Evaluation.   Outcome: NOT MET  Pt limited by participation, increased pain  Goal: STG-Within one week, patient will ambulate household distance  1) Individualized goal:  25ft x 1, FWW, CGA  2) Interventions:  PT Group Therapy, PT E Stim Attended, PT Gait Training, PT Therapeutic Exercises, PT Neuro Re-Ed/Balance, PT Therapeutic Activity, PT Manual Therapy and PT Evaluation.   Outcome: NOT MET  Pt limited by pain in hip, fear avoidance  Goal: STG-Within one week, patient will ascend and descend four to six stairs  1) Individualized goal:  3 x 1 stairs with unilateral HR, mod A  2) Interventions:  PT Group Therapy, PT E Stim Attended, PT Gait Training, PT Therapeutic Exercises, PT Neuro Re-Ed/Balance, PT Therapeutic Activity, PT Manual Therapy and PT Evaluation.   Outcome: NOT MET  Pt refused to attempt this week    Problem: Mobility Transfers  Goal: STG-Within one week, patient will perform bed mobility  1) Individualized goal:  [place] x 1 with AD as needed  2) Interventions:  PT Group Therapy, PT E Stim Attended, PT Gait Training, PT Therapeutic Exercises, PT Neuro Re-Ed/Balance, PT Therapeutic Activity, PT Manual Therapy and PT Evaluation.     Outcome: NOT MET  Pt limited by dependency, limited self efficacy, hip pain  Goal: STG-Within one week, patient will transfer bed to chair  1) Individualized goal:  Pt will transfer bed to chair with FWW, SBA  2) Interventions:  PT Group Therapy, PT E Stim Attended, PT Gait Training, PT Therapeutic Exercises, PT Neuro Re-Ed/Balance, PT Therapeutic Activity, PT Manual Therapy and PT Evaluation.     Outcome: NOT MET  Pt limited by pain, fear avoidance, participation       
Problem: OT Long Term Goals  Goal: LTG-By discharge, patient will complete basic self care tasks  1) Individualized Goal:  With setup and supervision using AE/AD/techniques as needed.  2) Interventions:  OT E Stim Attended, OT Self Care/ADL, OT Community Reintegration, OT Manual Ther Technique, OT Neuro Re-Ed/Balance, OT Sensory Int Techniques, OT Therapeutic Activity, OT Aquatic Therapy, OT Evaluation and OT Therapeutic Exercise   Outcome: NOT MET    Goal: LTG-By discharge, patient will perform bathroom transfers  1) Individualized Goal:  With setup and supervision using AE/AD/techniques as needed.  2) Interventions:  OT E Stim Attended, OT Self Care/ADL, OT Community Reintegration, OT Manual Ther Technique, OT Neuro Re-Ed/Balance, OT Sensory Int Techniques, OT Therapeutic Activity, OT Aquatic Therapy, OT Evaluation and OT Therapeutic Exercise   Outcome: NOT MET        
Problem: Pain Management  Goal: Pain level will decrease to patient's comfort goal  Outcome: PROGRESSING AS EXPECTED  Resident reported discomfort/pain on right side of neck and lower back, repositioned din bed for comfort, adjusted neck brace, decreased pain. Will continue to monitor       
Problem: Pain Management  Goal: Pain level will decrease to patient's comfort goal  Patient request not to be awaken for midnight tylenol or pain assessment.       
Problem: Safety  Goal: Will remain free from falls    Intervention: Implement fall precautions  Use of call light encouraged to make needs known.Bed in low position, non skid socks/shoes on when out of bed.Call light within reach.Will continue to monitor and assess needs and safety.      Problem: Bowel/Gastric:  Goal: Will not experience complications related to bowel motility    Intervention: Implement Bowel Protocol, if applicable  Pt refused scheduled suppository.Incontinent of bowel per report.LBM 12/17.Will continue to monitor and assess for s/sx of constipation.      Problem: Pain Management  Goal: Pain level will decrease to patient's comfort goal    Intervention: Follow pain managment plan developed in collaboration with patient and Interdisciplinary Team  Scheduled medication given with some relief per pt.Repositioned with pillows for comfort.Will continue to monitor and assess pain level and medicate as needed.      Problem: Urinary Elimination:  Goal: Ability to reestablish a normal urinary elimination pattern will improve    Intervention: Assist patient to sit on commode or toilet for voiding  Assisted to the bedside commode, continent at this time.Denies nay discomfort or dysuria.Will continue to monitor and assess.        
Problem: Safety  Goal: Will remain free from injury  Patient uses call light appropriately for assistance as needed/wanted. Bed locked, in lowest position.    Problem: Bowel/Gastric:  Goal: Normal bowel function is maintained or improved  Patient taking scheduled bowel medication; bowel sounds WNL x4 quadrants and abdomin soft and non-tender.       
Problem: Safety  Goal: Will remain free from injury  Patient uses call light appropriately for assistance as needed/wanted. Bed locked, in lowest position.    Problem: Bowel/Gastric:  Goal: Normal bowel function is maintained or improved  Patient taking scheduled bowel medication; bowel sounds WNL x4 quadrants and abdomin soft and non-tender.       
Problem: Safety  Goal: Will remain free from injury  Patient uses call light appropriately for assistance as needed/wanted. Bed locked, in lowest position.    Problem: Bowel/Gastric:  Goal: Normal bowel function is maintained or improved  Patient taking scheduled bowel medication; bowel sounds WNL x4 quadrants and abdomin soft and non-tender. LBM: 12/14/2018      
Problem: Safety  Goal: Will remain free from injury  Patient uses call light appropriately for assistance as needed/wanted. Bed locked, in lowest position.    Problem: Infection  Goal: Will remain free from infection  No acute signs or symptoms of infection during shift assessment.      
Problem: Safety  Goal: Will remain free from injury  Patient uses call light appropriately for assistance as needed/wanted. Bed locked, in lowest position.    Problem: Infection  Goal: Will remain free from infection  No signs or symptoms of acute infection this shift.    
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
## 2024-05-28 NOTE — PATIENT INSTRUCTIONS
-Keep dressings clean and dry. Change dressings every 3-4 days, and if they become over saturated, soiled or fall off.     -If you need to change your dressings at home: Wash your wound with normal saline, wound cleanser, or unscented soap and water prior to applying your new dressings. Please avoid cleansing with hydrogen peroxide or rubbing alcohol. Hydrogen peroxide and rubbing alcohol are toxic to new tissue and skin cells.    -Avoid prolonged standing or sitting without elevating your legs.    -Remove your compression garments if you have severe pain, severe swelling, numbness, color change, or temperature change in your toes. If you need to remove your compression garments, do so by unrolling them. Do not cut the compression garments off, this is to prevent cutting yourself on accident.    -Should you experience any significant changes in your wound(s), such as signs of infection (increasing redness, swelling, localized heat, increased pain, fever > 101 F, chills) or have any questions regarding your home care instructions, please contact the wound center at [phone]. If after hours, contact your primary care physician or go to the hospital emergency room.     -If you are 5 or more minutes late for an appointment, we reserve the right to cancel and reschedule that appointment. Additionally, if you are habitually late or not showing (3 late cancellations and/or no shows), we reserve the right to cancel your remaining appointments and it will be your responsibility to obtain a new referral if services are still needed.

## 2024-05-30 ENCOUNTER — OFFICE VISIT (OUTPATIENT)
Dept: MEDICAL GROUP | Age: 59
End: 2024-05-30
Payer: COMMERCIAL

## 2024-05-30 VITALS
WEIGHT: 195 LBS | BODY MASS INDEX: 33.29 KG/M2 | DIASTOLIC BLOOD PRESSURE: 82 MMHG | OXYGEN SATURATION: 95 % | HEIGHT: 64 IN | SYSTOLIC BLOOD PRESSURE: 130 MMHG | HEART RATE: 108 BPM | TEMPERATURE: 96.9 F

## 2024-05-30 DIAGNOSIS — I10 ESSENTIAL HYPERTENSION: ICD-10-CM

## 2024-05-30 DIAGNOSIS — J45.30 MILD PERSISTENT ASTHMA WITHOUT COMPLICATION: ICD-10-CM

## 2024-05-30 DIAGNOSIS — E53.8 VITAMIN B 12 DEFICIENCY: ICD-10-CM

## 2024-05-30 DIAGNOSIS — Z02.9 ADMINISTRATIVE ENCOUNTER: ICD-10-CM

## 2024-05-30 DIAGNOSIS — Z12.12 SCREENING FOR COLORECTAL CANCER: ICD-10-CM

## 2024-05-30 DIAGNOSIS — N81.4 UTEROVAGINAL PROLAPSE: ICD-10-CM

## 2024-05-30 DIAGNOSIS — E61.1 IRON DEFICIENCY: ICD-10-CM

## 2024-05-30 DIAGNOSIS — E55.9 VITAMIN D DEFICIENCY: ICD-10-CM

## 2024-05-30 DIAGNOSIS — Z12.4 PAP SMEAR FOR CERVICAL CANCER SCREENING: ICD-10-CM

## 2024-05-30 DIAGNOSIS — E78.5 DYSLIPIDEMIA: ICD-10-CM

## 2024-05-30 DIAGNOSIS — E66.9 OBESITY (BMI 30-39.9): ICD-10-CM

## 2024-05-30 DIAGNOSIS — Z98.84 S/P GASTRIC BYPASS: ICD-10-CM

## 2024-05-30 DIAGNOSIS — Z12.31 ENCOUNTER FOR SCREENING MAMMOGRAM FOR BREAST CANCER: ICD-10-CM

## 2024-05-30 DIAGNOSIS — Z12.11 SCREENING FOR COLORECTAL CANCER: ICD-10-CM

## 2024-05-30 DIAGNOSIS — E61.2 MAGNESIUM DEFICIENCY: ICD-10-CM

## 2024-05-30 DIAGNOSIS — E53.1 VITAMIN B6 DEFICIENCY: ICD-10-CM

## 2024-05-30 DIAGNOSIS — R73.01 IFG (IMPAIRED FASTING GLUCOSE): ICD-10-CM

## 2024-05-30 DIAGNOSIS — E66.09 CLASS 1 OBESITY DUE TO EXCESS CALORIES WITH SERIOUS COMORBIDITY AND BODY MASS INDEX (BMI) OF 33.0 TO 33.9 IN ADULT: ICD-10-CM

## 2024-05-30 RX ORDER — PHENTERMINE HYDROCHLORIDE 37.5 MG/1
37.5 CAPSULE ORAL EVERY MORNING
Qty: 90 CAPSULE | Refills: 0 | Status: SHIPPED | OUTPATIENT
Start: 2024-08-30 | End: 2024-11-28

## 2024-05-30 RX ORDER — PHENTERMINE HYDROCHLORIDE 37.5 MG/1
37.5 CAPSULE ORAL EVERY MORNING
Qty: 90 CAPSULE | Refills: 0 | Status: SHIPPED | OUTPATIENT
Start: 2024-05-30 | End: 2024-08-28

## 2024-05-30 RX ORDER — PYRIDOXINE HCL (VITAMIN B6) 50 MG
50 TABLET ORAL DAILY
Qty: 30 TABLET | Refills: 11 | Status: SHIPPED | OUTPATIENT
Start: 2024-05-30

## 2024-05-30 RX ORDER — PHENTERMINE HYDROCHLORIDE 37.5 MG/1
37.5 CAPSULE ORAL EVERY MORNING
Qty: 90 CAPSULE | Refills: 1 | Status: SHIPPED | OUTPATIENT
Start: 2024-05-30 | End: 2024-05-30 | Stop reason: SDUPTHER

## 2024-05-30 ASSESSMENT — ENCOUNTER SYMPTOMS
MUSCULOSKELETAL NEGATIVE: 1
NEUROLOGICAL NEGATIVE: 1
EYES NEGATIVE: 1
CARDIOVASCULAR NEGATIVE: 1
PSYCHIATRIC NEGATIVE: 1
RESPIRATORY NEGATIVE: 1
GASTROINTESTINAL NEGATIVE: 1
CONSTITUTIONAL NEGATIVE: 1

## 2024-05-30 ASSESSMENT — FIBROSIS 4 INDEX: FIB4 SCORE: 1.2

## 2024-05-31 NOTE — PROGRESS NOTES
Subjective     Karine Ovalle is a 59 y.o. female who presents with Follow-Up ([facility] paper work and medication refills )  The patient is here for followup of chronic medical problems listed below. The patient is compliant with medications and having no side effects from them. Denies chest pain, abdominal pain, dyspnea, myalgias, or cough.   Patient Active Problem List   Diagnosis    Vitamin B 12 deficiency    Obesity (BMI 30-39.9)    Chronic bilateral low back pain with bilateral sciatica- pain mgt;    spine nv    Venous insufficiency    Lymphedema    Uncomplicated opioid dependence (CMS-HCC)- spine nv    Essential hypertension    DDD (degenerative disc disease), lumbar    Class 1 obesity due to excess calories with serious comorbidity and body mass index (BMI) of 33.0 to 33.9 in adult- rx phentermine    DDD (degenerative disc disease), cervical    Cervical myelopathy (HCC)    Vitamin D deficiency    Primary insomnia    Primary osteoarthritis of both knees- sp right TKR 2015; left TKR tbd in future dr nowak    Administrative [id number] [facility] paperwork for workplace accommodation for chronic DDD and DJD hips and shoulders bilaterally status post multiple joint replacements    Chronic pain syndrome    Moderate persistent asthma without complication    Obesity due to excess calories with serious comorbidity    Primary stress urinary incontinence    Mild persistent asthma without complication    Acute asthma exacerbation    Deformity of lower extremity, right- post surgical for mrsa cellulitis, debridement at [facility], [place]    Degenerative arthritis of left knee     Outpatient Medications Prior to Visit   Medication Sig Dispense Refill    gabapentin (NEURONTIN) 800 MG tablet Take 1 Tablet by mouth 3 times a day for 90 days. Indications: Neuropathic Pain 270 Tablet 0    methocarbamol (ROBAXIN) 500 MG Tab TAKE 2 TABLETS BY MOUTH 4 TIMES DAILY AS NEEDED FOR BACK PAIN AND SPASMS 240 Tablet 0    potassium chloride SA  (KDUR) 20 MEQ Tab CR Take 20 mEq by mouth.      albuterol 108 (90 Base) MCG/ACT Aero Soln inhalation aerosol INHALE 2 PUFFS BY MOUTH EVERY 4 HOURS AS NEEDED FOR SHORTNESS OF BREATH. 9 g 0    oxyCODONE-acetaminophen (PERCOCET-10)  MG Tab Take 1 Tablet by mouth every 6 hours.      fluticasone (FLOVENT HFA) 44 MCG/ACT Aerosol Inhale 2 Puffs 2 times a day as needed (tobacco smoke). Everyday maintenance steroid inhaler  Indications: Asthma 1 Each 11    ipratropium-albuterol (DUONEB) 0.5-2.5 (3) MG/3ML nebulizer solution Inhale 3 mL by nebulization every four hours as needed for Shortness of Breath. 90 Each 0    Naloxone (NARCAN) 4 MG/0.1ML Liquid naloxone 4 mg/actuation nasal spray   CALL 911. SPR CONTENTS OF ONE SPRAYER (0.1ML) INTO ONE NOSTRIL. REPEAT IN 2-3 MIN IF SYMPTOMS OF OPIOID EMERGENCY PERSIST, ALTERNATE NOSTRILS      morphine ER (MS CONTIN) 15 MG Tab CR tablet morphine ER 15 mg tablet,extended release   Take 1 tablet every 12 hours by oral route as directed for 30 days.   Do not drive, drink etoh while taking.      morphine ER (MS CONTIN) 30 MG Tab CR tablet morphine ER 30 mg tablet,extended release   TAKE 1 TABLET BY MOUTH EVERY 12 HOURS FOR 3 DAYS      albuterol 108 (90 Base) MCG/ACT Aero Soln inhalation aerosol Inhale 2 Puffs every 6 hours as needed for Shortness of Breath. 8.5 g 0    furosemide (LASIX) 40 MG Tab Take 1 Tablet by mouth 1 time a day as needed (leg edema). Patient reports only takes if leg edema increases. She doesn't use daily as prescribed.   Indications: Edema 90 Tablet 4    morphine ER (MS CONTIN) 15 MG Tab CR tablet Take 15 mg by mouth every 12 hours. Indications: Pain      potassium Chloride ER (K-TAB) 20 MEQ Tab CR tablet Take 20 mEq by mouth 1 time a day as needed (Per patient, she takes with furosemide. ). Indications: takes with furosemide      vitamin D3 (CHOLECALCIFEROL) 1000 Unit (25 mcg) Tab Take 1,000 Units by mouth every day. Indications: Vitamin D Deficiency, dietary  supplement      Biotin 5000 MCG Cap Take 1 Capsule by mouth every day. Indications: dietary supplement      zinc sulfate (ZINCATE) 220 (50 Zn) MG Cap Take 220 mg by mouth every day. Indications: Impaired Wound Healing, dietary supplement       Multiple Vitamin (MULTIVITAMIN ADULT) Tab Take 1 Tablet by mouth every day. Indications: Nutritional Support      ascorbic acid (VITAMIN C) 500 MG tablet Take 1 Tablet by mouth 2 times a day. 100 Tablet 4    oxyCODONE-acetaminophen (PERCOCET-10)  MG Tab Take 1 Tablet by mouth every 6 hours as needed for Moderate Pain or Severe Pain. Indications: breakthrough pain betweeen morphine administration  0    CALCIUM CARBONATE-VITAMIN D PO Take 1 Tablet by mouth every day. Indications: Low Amount of Calcium in the Blood      ferrous sulfate 325 (65 Fe) MG tablet Take 1 Tab by mouth every morning with breakfast. 30 Tab 1    phentermine 37.5 MG capsule Take 1 Capsule by mouth every morning for 90 days. 90 Capsule 0    gabapentin (NEURONTIN) 800 MG tablet Take 1 Tablet by mouth 3 times a day. 270 Tablet 0    gabapentin (NEURONTIN) 800 MG tablet Take 1 Tablet by mouth 3 times a day.       No facility-administered medications prior to visit.     No visits with results within 1 Month(s) from this visit.   Latest known visit with results is:   Office Visit on 02/25/2024   Component Date Value    SARS-CoV-2 by PCR 02/25/2024 Positive (A)     Influenza virus A RNA 02/25/2024 Negative     Influenza virus B, PCR 02/25/2024 Negative     RSV, PCR 02/25/2024 Negative       Lab Results   Component Value Date/Time    HBA1C 4.9 09/22/2023 10:36 AM    HBA1C 4.7 06/02/2022 02:46 AM     Lab Results   Component Value Date/Time    SODIUM 140 02/16/2024 01:28 PM    POTASSIUM 4.2 02/16/2024 01:28 PM    CHLORIDE 106 02/16/2024 01:28 PM    CO2 21 02/16/2024 01:28 PM    GLUCOSE 85 02/16/2024 01:28 PM    BUN 13 02/16/2024 01:28 PM    CREATININE 0.50 02/16/2024 01:28 PM    CREATININE 0.88 08/14/2010 12:00  "AM    BUNCREATRAT 13 08/14/2010 12:00 AM    GLOMRATE >59 08/14/2010 12:00 AM    ALKPHOSPHAT 136 (H) 02/16/2024 01:28 PM    ASTSGOT 26 02/16/2024 01:28 PM    ALTSGPT 17 02/16/2024 01:28 PM    TBILIRUBIN <0.2 02/16/2024 01:28 PM     Lab Results   Component Value Date/Time    INR 1.25 (H) 06/01/2022 03:30 PM    INR 1.00 02/13/2019 11:44 AM    INR 1.01 12/04/2018 11:15 AM     Lab Results   Component Value Date/Time    CHOLSTRLTOT 169 09/22/2023 10:36 AM    LDL 81 09/22/2023 10:36 AM    HDL 71 09/22/2023 10:36 AM    TRIGLYCERIDE 83 09/22/2023 10:36 AM       No results found for: \"TESTOSTERONE\"  Lab Results   Component Value Date/Time    TSH 2.110 08/14/2010 12:00 AM     Lab Results   Component Value Date/Time    FREET4 0.72 05/03/2016 07:32 AM     Lab Results   Component Value Date/Time    URICACID 5.6 08/12/2014 04:49 PM     No components found for: \"VITB12\"  Lab Results   Component Value Date/Time    25HYDROXY 32 09/22/2023 10:36 AM    25HYDROXY 44 06/02/2022 02:42 PM               HPI    Review of Systems   Constitutional: Negative.    HENT: Negative.     Eyes: Negative.    Respiratory: Negative.     Cardiovascular: Negative.    Gastrointestinal: Negative.    Genitourinary: Negative.    Musculoskeletal: Negative.    Skin: Negative.    Neurological: Negative.    Endo/Heme/Allergies: Negative.    Psychiatric/Behavioral: Negative.                Objective     /82 (BP Location: Right arm, Patient Position: Sitting, BP Cuff Size: Large adult)   Pulse (!) 108   Temp 36.1 °C (96.9 °F) (Temporal)   Ht 1.626 m (5' 4\")   Wt 88.5 kg (195 lb)   LMP  (LMP Unknown)   SpO2 95%   BMI 33.47 kg/m²      Physical Exam  Vitals reviewed.   Constitutional:       General: She is not in acute distress.     Appearance: She is well-developed. She is not diaphoretic.   HENT:      Head: Normocephalic and atraumatic.      Right Ear: External ear normal.      Left Ear: External ear normal.      Nose: Nose normal.      Mouth/Throat:     "  Pharynx: No oropharyngeal exudate.   Eyes:      General: No scleral icterus.        Right eye: No discharge.         Left eye: No discharge.      Conjunctiva/sclera: Conjunctivae normal.      Pupils: Pupils are equal, round, and reactive to light.   Neck:      Thyroid: No thyromegaly.      Vascular: No JVD.      Trachea: No tracheal deviation.   Cardiovascular:      Rate and Rhythm: Normal rate and regular rhythm.      Heart sounds: Normal heart sounds. No murmur heard.     No friction rub. No gallop.   Pulmonary:      Effort: Pulmonary effort is normal. No respiratory distress.      Breath sounds: Normal breath sounds. No stridor. No wheezing or rales.   Chest:      Chest wall: No tenderness.   Abdominal:      General: Bowel sounds are normal. There is no distension.      Palpations: Abdomen is soft. There is no mass.      Tenderness: There is no abdominal tenderness. There is no guarding or rebound.   Musculoskeletal:         General: No tenderness. Normal range of motion.      Cervical back: Normal range of motion and neck supple.   Lymphadenopathy:      Cervical: No cervical adenopathy.   Skin:     General: Skin is warm and dry.      Coloration: Skin is not pale.      Findings: No erythema or rash.   Neurological:      Mental Status: She is alert and oriented to person, place, and time.      Cranial Nerves: No cranial nerve deficit.      Motor: No abnormal muscle tone.      Coordination: Coordination normal.      Deep Tendon Reflexes: Reflexes are normal and symmetric. Reflexes normal.   Psychiatric:         Behavior: Behavior normal.         Thought Content: Thought content normal.         Judgment: Judgment normal.                             Assessment & Plan        1. Administrative [id number] [facility] paperwork for workplace accommodation for chronic DDD and DJD hips and shoulders bilaterally status post multiple joint replacements  Patient remains on FMLA and off work until her venous stasis ulcer  treatment can be completely healed and then patient proceed with total knee replacement on the left which allow her to ambulate more effectively without pain.    2. Screening for colorectal cancer      - Referral to GI for Colonoscopy    3. Encounter for screening mammogram for breast cancer  For the patient's weight she is extremely high risk for breast cancer and should therefore get annually every other year.  She is in high risk group.  Metrics changed to annual screening and because of (dose (she should strongly consider [employer] any in addition to routine screening.  - MA-SCREENING MAMMO BILAT W/CAD; Future    4. Class 1 obesity due to excess calories with serious comorbidity and body mass index (BMI) of 33.0 to 33.9 in adult- rx phentermine  Mediterranean diet and exercise.  Needs to lose at least another 20 pounds to get her BMI less than 30.  She would be an excellent candidate for GLP-1 agonist but this might be difficult to convince insurance companies.  She should look into it on her own.    5. Uterovaginal prolapse chronic ongoing problem but getting progressively worse symptomatically and needs further follow-up by GYN as well as Pap smear screening.     - Referral to Gynecology    6. Pap smear for cervical cancer screening  As above  - Referral to Gynecology    7. Mild persistent asthma without complication      Good control continue albuterol inhaler as needed and obtain pulmonary function testing to assess the severity of her asthma.  - PULMONARY FUNCTION TESTS -Test requested: Complete Pulmonary Function Test; Future    8. S/P gastric bypass  Patient needs to continue follow-up and surveillance for any metabolic deficiencies or vitamin deficiencies due to her gastric bypass several years ago.  She is at risk for deficiencies in multiple minerals including magnesium iron calcium vitamin D B12 and folate and needs supplements of these on regular basis.  He is also deficient in B6 and she should start on  replacement for that.  - VITAMIN D,25 HYDROXY (DEFICIENCY); Future  - VITAMIN B12; Future  - FOLATE; Future  - IRON/TOTAL IRON BIND; Future  - FERRITIN; Future  - VITAMIN B6; Future  - MAGNESIUM; Future    9. IFG (impaired fasting glucose)  Borderline.  Continue Mediterranean diet and exercise.  Lose 15 pounds.  - HEMOGLOBIN A1C; Future    10. Vitamin B 12 deficiency  Good control continue supplements 1000 mcg vitamin B12 daily  - VITAMIN B12; Future  - FOLATE; Future    11. Vitamin D deficiency  Good control continue current regimen vitamin D3 1000 units daily.  - VITAMIN D,25 HYDROXY (DEFICIENCY); Future    12. Essential hypertension       13. Dyslipidemia     - Comp Metabolic Panel; Future  - CBC WITH DIFFERENTIAL; Future  - TSH; Future  - Lipid Profile; Future    14. Iron deficiency-due to gastric bypass  Borderline control.  Needs chronic iron replacement with very 25 mg of iron every other day  - FOLATE; Future  - IRON/TOTAL IRON BIND; Future  - FERRITIN; Future    15. Vitamin B6 deficiency-due to gastric bypass.  Start replacement therapy with pyridoxine 50 mg daily  - VITAMIN B6; Future    16. Magnesium deficiency secondary to gastric bypass.  Continue maintenance replacement therapy with Mag-Ox 400 mg daily OTC  - MAGNESIUM; Future    17. Obesity (BMI 30-39.9)  Under reasonable control but still not at goal.  Needs more significant weight reduction.  Will continue on phentermine but patient will look into the possibility of obtaining a GLP-1 agonist.  This would be most beneficial to her reduce her risk for complications of all her other medical problems if she were able to start on this.  She has significant comorbid conditions that are multiple.  These include significant DJD, dyslipidemia, impaired fasting glucose, and poor wound healing.  Obesity puts her at risk also for malignancy.  - phentermine 37.5 MG capsule; Take 1 Capsule by mouth every morning for 90 days.  Dispense: 90 Capsule; Refill: 0  -  phentermine 37.5 MG capsule; Take 1 Capsule by mouth every morning for 90 days.  Dispense: 90 Capsule; Refill: 0          Mediterranean diet and exercise.

## 2024-06-04 ENCOUNTER — OFFICE VISIT (OUTPATIENT)
Dept: WOUND CARE | Facility: MEDICAL CENTER | Age: 59
End: 2024-06-04
Attending: INTERNAL MEDICINE
Payer: COMMERCIAL

## 2024-06-04 VITALS
TEMPERATURE: 98 F | HEART RATE: 111 BPM | RESPIRATION RATE: 16 BRPM | OXYGEN SATURATION: 94 % | SYSTOLIC BLOOD PRESSURE: 126 MMHG | DIASTOLIC BLOOD PRESSURE: 64 MMHG

## 2024-06-04 DIAGNOSIS — S81.801D OPEN WOUND OF RIGHT LOWER EXTREMITY, SUBSEQUENT ENCOUNTER: ICD-10-CM

## 2024-06-04 DIAGNOSIS — I89.0 LYMPHEDEMA: ICD-10-CM

## 2024-06-04 DIAGNOSIS — E66.9 OBESITY (BMI 30-39.9): ICD-10-CM

## 2024-06-04 DIAGNOSIS — S81.802D OPEN WOUND OF LEFT LOWER EXTREMITY, SUBSEQUENT ENCOUNTER: ICD-10-CM

## 2024-06-04 DIAGNOSIS — G89.4 CHRONIC PAIN SYNDROME: ICD-10-CM

## 2024-06-04 PROCEDURE — 99213 OFFICE O/P EST LOW 20 MIN: CPT | Mod: 25 | Performed by: STUDENT IN AN ORGANIZED HEALTH CARE EDUCATION/TRAINING PROGRAM

## 2024-06-04 PROCEDURE — 3074F SYST BP LT 130 MM HG: CPT | Performed by: STUDENT IN AN ORGANIZED HEALTH CARE EDUCATION/TRAINING PROGRAM

## 2024-06-04 PROCEDURE — 3078F DIAST BP <80 MM HG: CPT | Performed by: STUDENT IN AN ORGANIZED HEALTH CARE EDUCATION/TRAINING PROGRAM

## 2024-06-04 PROCEDURE — 11042 DBRDMT SUBQ TIS 1ST 20SQCM/<: CPT | Performed by: STUDENT IN AN ORGANIZED HEALTH CARE EDUCATION/TRAINING PROGRAM

## 2024-06-04 PROCEDURE — 99213 OFFICE O/P EST LOW 20 MIN: CPT

## 2024-06-04 PROCEDURE — 11042 DBRDMT SUBQ TIS 1ST 20SQCM/<: CPT

## 2024-06-04 NOTE — PATIENT INSTRUCTIONS
After Visit Summary Wound Care Instructions    Nutrition - Patient instructed increased protein diet unless contraindicated in renal failure (meat, eggs, fish, yogurt, cottage cheese, beans), use of multivitamin with minerals and Arginaid supplementation (check if ok with Primary Care Provider first).    Infection -  instructed signs and symptoms of infection, increased redness and swelling, localized heat over wound and surrounding area/fever/chills/nausea and vomiting, when to call doctor or go to Emergency Room.     Dressing Changes - Instructed patient rationale for wound care products. Instructed to keep dressings clean and dry, shower on clinic days right before coming in. Change your dressing if it becomes soiled, soaked, or falls off.         Diabetic Instruction - Patient instructed keep tight control over Blood Sugar, <140 for optimal wound healing; Implications of loss of protective sensation (LOPS) discussed with patient, including increased risk for amputation.  Advised to check feet at least daily, moisturize feet, and to always wear protective foot wear, arrange meticulous regular foot care by podiatrist or Certified Foot Care Nurse (CFCN). Patient with good understanding.       Questions - should you have any questions regarding your home care instructions, please contact the wound center at [phone]. If after hours, contact your primary care physician or go to the hospital emergency room.
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
## 2024-06-11 ENCOUNTER — HOSPITAL ENCOUNTER (OUTPATIENT)
Dept: RADIOLOGY | Facility: MEDICAL CENTER | Age: 59
End: 2024-06-11
Attending: INTERNAL MEDICINE
Payer: COMMERCIAL

## 2024-06-11 DIAGNOSIS — Z12.31 ENCOUNTER FOR SCREENING MAMMOGRAM FOR BREAST CANCER: ICD-10-CM

## 2024-06-11 PROCEDURE — 77063 BREAST TOMOSYNTHESIS BI: CPT

## 2024-06-13 ENCOUNTER — NON-PROVIDER VISIT (OUTPATIENT)
Dept: WOUND CARE | Facility: MEDICAL CENTER | Age: 59
End: 2024-06-13
Attending: INTERNAL MEDICINE
Payer: COMMERCIAL

## 2024-06-13 PROCEDURE — 97597 DBRDMT OPN WND 1ST 20 CM/<: CPT

## 2024-06-14 NOTE — PROGRESS NOTES
2% viscous lidocaine for ~5 minute dwell time. CSWD with curette to remove 1.5 cm2 non viable tissue from wound bed. Patient tolerated well.

## 2024-06-17 DIAGNOSIS — M51.36 DDD (DEGENERATIVE DISC DISEASE), LUMBAR: ICD-10-CM

## 2024-06-17 DIAGNOSIS — M54.41 CHRONIC BILATERAL LOW BACK PAIN WITH BILATERAL SCIATICA: ICD-10-CM

## 2024-06-17 DIAGNOSIS — M50.30 DDD (DEGENERATIVE DISC DISEASE), CERVICAL: ICD-10-CM

## 2024-06-17 DIAGNOSIS — M54.42 CHRONIC BILATERAL LOW BACK PAIN WITH BILATERAL SCIATICA: ICD-10-CM

## 2024-06-17 DIAGNOSIS — G89.29 CHRONIC BILATERAL LOW BACK PAIN WITH BILATERAL SCIATICA: ICD-10-CM

## 2024-06-17 RX ORDER — METHOCARBAMOL 500 MG/1
TABLET, FILM COATED ORAL
Qty: 240 TABLET | Refills: 0 | Status: SHIPPED | OUTPATIENT
Start: 2024-06-17

## 2024-06-18 ENCOUNTER — NON-PROVIDER VISIT (OUTPATIENT)
Dept: WOUND CARE | Facility: MEDICAL CENTER | Age: 59
End: 2024-06-18
Attending: INTERNAL MEDICINE
Payer: COMMERCIAL

## 2024-06-18 PROCEDURE — 97602 WOUND(S) CARE NON-SELECTIVE: CPT

## 2024-06-19 NOTE — PROGRESS NOTES
Non-Selective Debridement with puracyn spray and gauze to debride non-viable tissue from the wound bed and periwound areas. Refer to flow sheet for wound care details. Patient tolerated the procedure well.

## 2024-06-19 NOTE — PATIENT INSTRUCTIONS
-Keep your wound dressing clean, dry, and intact. Only change dressing if it's over saturated, soiled or falls off.     -Change your dressing if it becomes soiled, soaked, or falls off.    -Should you experience any significant changes in your wound(s), such as infection (redness, swelling, localized heat, increased pain, fever > 101 F, chills) or have any questions regarding your home care instructions, please contact the wound center at [phone]. If after hours, contact your primary care physician or go to the hospital emergency room.

## 2024-06-25 ENCOUNTER — OFFICE VISIT (OUTPATIENT)
Dept: WOUND CARE | Facility: MEDICAL CENTER | Age: 59
End: 2024-06-25
Attending: INTERNAL MEDICINE
Payer: COMMERCIAL

## 2024-06-25 VITALS
OXYGEN SATURATION: 94 % | HEART RATE: 113 BPM | TEMPERATURE: 98.2 F | RESPIRATION RATE: 20 BRPM | SYSTOLIC BLOOD PRESSURE: 126 MMHG | DIASTOLIC BLOOD PRESSURE: 84 MMHG

## 2024-06-25 DIAGNOSIS — S81.802D OPEN WOUND OF LEFT LOWER EXTREMITY, SUBSEQUENT ENCOUNTER: ICD-10-CM

## 2024-06-25 DIAGNOSIS — E66.9 OBESITY (BMI 30-39.9): ICD-10-CM

## 2024-06-25 DIAGNOSIS — S81.801D OPEN WOUND OF RIGHT LOWER EXTREMITY, SUBSEQUENT ENCOUNTER: ICD-10-CM

## 2024-06-25 DIAGNOSIS — M25.571 PAIN IN JOINT OF RIGHT FOOT: ICD-10-CM

## 2024-06-25 DIAGNOSIS — G89.4 CHRONIC PAIN SYNDROME: ICD-10-CM

## 2024-06-25 DIAGNOSIS — I89.0 LYMPHEDEMA: ICD-10-CM

## 2024-06-25 PROCEDURE — 99213 OFFICE O/P EST LOW 20 MIN: CPT | Mod: 25 | Performed by: NURSE PRACTITIONER

## 2024-06-25 PROCEDURE — 3079F DIAST BP 80-89 MM HG: CPT | Performed by: NURSE PRACTITIONER

## 2024-06-25 PROCEDURE — 11042 DBRDMT SUBQ TIS 1ST 20SQCM/<: CPT | Performed by: NURSE PRACTITIONER

## 2024-06-25 PROCEDURE — 11042 DBRDMT SUBQ TIS 1ST 20SQCM/<: CPT

## 2024-06-25 PROCEDURE — 3074F SYST BP LT 130 MM HG: CPT | Performed by: NURSE PRACTITIONER

## 2024-06-25 PROCEDURE — 99213 OFFICE O/P EST LOW 20 MIN: CPT

## 2024-06-26 NOTE — PATIENT INSTRUCTIONS
-Change your dressing as instructed and immediately if it becomes soiled, soaked, or falls off.    -Should you experience any significant changes in your wound(s), such as infection (redness, swelling, localized heat, increased pain, fever > 101 F, chills) or have any questions regarding your home care instructions, please contact the wound center at [phone]. If after hours, contact your primary care physician or go to the hospital emergency room.
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
## 2024-07-02 ENCOUNTER — NON-PROVIDER VISIT (OUTPATIENT)
Dept: WOUND CARE | Facility: MEDICAL CENTER | Age: 59
End: 2024-07-02
Attending: INTERNAL MEDICINE
Payer: COMMERCIAL

## 2024-07-02 PROCEDURE — 97602 WOUND(S) CARE NON-SELECTIVE: CPT

## 2024-07-11 ENCOUNTER — OFFICE VISIT (OUTPATIENT)
Dept: WOUND CARE | Facility: MEDICAL CENTER | Age: 59
End: 2024-07-11
Attending: INTERNAL MEDICINE
Payer: COMMERCIAL

## 2024-07-11 DIAGNOSIS — S81.801D OPEN WOUND OF RIGHT LOWER EXTREMITY, SUBSEQUENT ENCOUNTER: ICD-10-CM

## 2024-07-11 DIAGNOSIS — S81.802D OPEN WOUND OF LEFT LOWER EXTREMITY, SUBSEQUENT ENCOUNTER: ICD-10-CM

## 2024-07-11 PROCEDURE — 99999 PR NO CHARGE: CPT | Performed by: STUDENT IN AN ORGANIZED HEALTH CARE EDUCATION/TRAINING PROGRAM

## 2024-07-18 ENCOUNTER — OFFICE VISIT (OUTPATIENT)
Dept: URGENT CARE | Facility: CLINIC | Age: 59
End: 2024-07-18
Payer: COMMERCIAL

## 2024-07-18 ENCOUNTER — OFFICE VISIT (OUTPATIENT)
Dept: WOUND CARE | Facility: MEDICAL CENTER | Age: 59
End: 2024-07-18
Attending: INTERNAL MEDICINE
Payer: COMMERCIAL

## 2024-07-18 VITALS
TEMPERATURE: 98 F | OXYGEN SATURATION: 93 % | SYSTOLIC BLOOD PRESSURE: 112 MMHG | DIASTOLIC BLOOD PRESSURE: 66 MMHG | HEART RATE: 109 BPM | RESPIRATION RATE: 17 BRPM

## 2024-07-18 VITALS
HEIGHT: 63 IN | HEART RATE: 102 BPM | DIASTOLIC BLOOD PRESSURE: 70 MMHG | OXYGEN SATURATION: 94 % | TEMPERATURE: 97.6 F | BODY MASS INDEX: 32.78 KG/M2 | WEIGHT: 185 LBS | RESPIRATION RATE: 18 BRPM | SYSTOLIC BLOOD PRESSURE: 122 MMHG

## 2024-07-18 DIAGNOSIS — M25.571 PAIN IN JOINT OF RIGHT FOOT: ICD-10-CM

## 2024-07-18 DIAGNOSIS — J06.9 BACTERIAL UPPER RESPIRATORY INFECTION: ICD-10-CM

## 2024-07-18 DIAGNOSIS — G89.4 CHRONIC PAIN SYNDROME: ICD-10-CM

## 2024-07-18 DIAGNOSIS — E66.9 OBESITY (BMI 30-39.9): ICD-10-CM

## 2024-07-18 DIAGNOSIS — B96.89 BACTERIAL UPPER RESPIRATORY INFECTION: ICD-10-CM

## 2024-07-18 DIAGNOSIS — S81.801D OPEN WOUND OF RIGHT LOWER EXTREMITY, SUBSEQUENT ENCOUNTER: ICD-10-CM

## 2024-07-18 DIAGNOSIS — S81.802D OPEN WOUND OF LEFT LOWER EXTREMITY, SUBSEQUENT ENCOUNTER: ICD-10-CM

## 2024-07-18 DIAGNOSIS — I89.0 LYMPHEDEMA: ICD-10-CM

## 2024-07-18 PROCEDURE — 3078F DIAST BP <80 MM HG: CPT | Performed by: NURSE PRACTITIONER

## 2024-07-18 PROCEDURE — 11042 DBRDMT SUBQ TIS 1ST 20SQCM/<: CPT | Performed by: NURSE PRACTITIONER

## 2024-07-18 PROCEDURE — 3078F DIAST BP <80 MM HG: CPT | Performed by: PHYSICIAN ASSISTANT

## 2024-07-18 PROCEDURE — 99213 OFFICE O/P EST LOW 20 MIN: CPT | Performed by: PHYSICIAN ASSISTANT

## 2024-07-18 PROCEDURE — 3074F SYST BP LT 130 MM HG: CPT | Performed by: NURSE PRACTITIONER

## 2024-07-18 PROCEDURE — 11042 DBRDMT SUBQ TIS 1ST 20SQCM/<: CPT

## 2024-07-18 PROCEDURE — 3074F SYST BP LT 130 MM HG: CPT | Performed by: PHYSICIAN ASSISTANT

## 2024-07-18 RX ORDER — AMOXICILLIN AND CLAVULANATE POTASSIUM 875; 125 MG/1; MG/1
1 TABLET, FILM COATED ORAL 2 TIMES DAILY
Qty: 14 TABLET | Refills: 0 | Status: SHIPPED | OUTPATIENT
Start: 2024-07-18 | End: 2024-07-25

## 2024-07-18 ASSESSMENT — ENCOUNTER SYMPTOMS
CHILLS: 0
WHEEZING: 0
PALPITATIONS: 0
FEVER: 0
HEMOPTYSIS: 0
SHORTNESS OF BREATH: 0
COUGH: 1
SPUTUM PRODUCTION: 1

## 2024-07-18 ASSESSMENT — FIBROSIS 4 INDEX: FIB4 SCORE: 1.2

## 2024-07-23 ENCOUNTER — APPOINTMENT (OUTPATIENT)
Dept: URGENT CARE | Facility: CLINIC | Age: 59
End: 2024-07-23
Payer: COMMERCIAL

## 2024-07-23 ENCOUNTER — OFFICE VISIT (OUTPATIENT)
Dept: WOUND CARE | Facility: MEDICAL CENTER | Age: 59
End: 2024-07-23
Attending: INTERNAL MEDICINE
Payer: COMMERCIAL

## 2024-07-23 ENCOUNTER — OFFICE VISIT (OUTPATIENT)
Dept: URGENT CARE | Facility: CLINIC | Age: 59
End: 2024-07-23
Payer: COMMERCIAL

## 2024-07-23 VITALS
OXYGEN SATURATION: 94 % | RESPIRATION RATE: 18 BRPM | TEMPERATURE: 96.6 F | SYSTOLIC BLOOD PRESSURE: 119 MMHG | HEART RATE: 104 BPM | DIASTOLIC BLOOD PRESSURE: 81 MMHG

## 2024-07-23 VITALS
WEIGHT: 175 LBS | RESPIRATION RATE: 19 BRPM | HEART RATE: 106 BPM | TEMPERATURE: 97.6 F | HEIGHT: 63 IN | BODY MASS INDEX: 31.01 KG/M2 | OXYGEN SATURATION: 96 % | SYSTOLIC BLOOD PRESSURE: 128 MMHG | DIASTOLIC BLOOD PRESSURE: 88 MMHG

## 2024-07-23 DIAGNOSIS — R05.1 ACUTE COUGH: ICD-10-CM

## 2024-07-23 DIAGNOSIS — S81.802D OPEN WOUND OF LEFT LOWER EXTREMITY, SUBSEQUENT ENCOUNTER: ICD-10-CM

## 2024-07-23 DIAGNOSIS — G89.4 CHRONIC PAIN SYNDROME: ICD-10-CM

## 2024-07-23 DIAGNOSIS — S81.801D OPEN WOUND OF RIGHT LOWER EXTREMITY, SUBSEQUENT ENCOUNTER: ICD-10-CM

## 2024-07-23 DIAGNOSIS — M25.571 PAIN IN JOINT OF RIGHT FOOT: ICD-10-CM

## 2024-07-23 DIAGNOSIS — J40 BRONCHITIS: ICD-10-CM

## 2024-07-23 DIAGNOSIS — E66.9 OBESITY (BMI 30-39.9): ICD-10-CM

## 2024-07-23 DIAGNOSIS — I89.0 LYMPHEDEMA: ICD-10-CM

## 2024-07-23 DIAGNOSIS — R49.1 LOSS OF VOICE: ICD-10-CM

## 2024-07-23 PROCEDURE — 3079F DIAST BP 80-89 MM HG: CPT | Performed by: PHYSICIAN ASSISTANT

## 2024-07-23 PROCEDURE — 11042 DBRDMT SUBQ TIS 1ST 20SQCM/<: CPT

## 2024-07-23 PROCEDURE — 3074F SYST BP LT 130 MM HG: CPT | Performed by: PHYSICIAN ASSISTANT

## 2024-07-23 PROCEDURE — 11042 DBRDMT SUBQ TIS 1ST 20SQCM/<: CPT | Performed by: NURSE PRACTITIONER

## 2024-07-23 PROCEDURE — 99213 OFFICE O/P EST LOW 20 MIN: CPT | Performed by: PHYSICIAN ASSISTANT

## 2024-07-23 RX ORDER — ALBUTEROL SULFATE 90 UG/1
1-2 AEROSOL, METERED RESPIRATORY (INHALATION) EVERY 4 HOURS PRN
Qty: 1 EACH | Refills: 0 | Status: SHIPPED | OUTPATIENT
Start: 2024-07-23

## 2024-07-23 RX ORDER — METHYLPREDNISOLONE 4 MG/1
TABLET ORAL
Qty: 21 TABLET | Refills: 0 | Status: SHIPPED | OUTPATIENT
Start: 2024-07-23

## 2024-07-23 RX ORDER — BENZONATATE 100 MG/1
100 CAPSULE ORAL 3 TIMES DAILY PRN
Qty: 20 CAPSULE | Refills: 0 | Status: SHIPPED | OUTPATIENT
Start: 2024-07-23

## 2024-07-23 ASSESSMENT — FIBROSIS 4 INDEX: FIB4 SCORE: 1.2

## 2024-07-30 ENCOUNTER — APPOINTMENT (OUTPATIENT)
Dept: WOUND CARE | Facility: MEDICAL CENTER | Age: 59
End: 2024-07-30
Attending: INTERNAL MEDICINE
Payer: COMMERCIAL

## 2024-08-01 ENCOUNTER — OFFICE VISIT (OUTPATIENT)
Dept: WOUND CARE | Facility: MEDICAL CENTER | Age: 59
End: 2024-08-01
Attending: INTERNAL MEDICINE
Payer: COMMERCIAL

## 2024-08-01 VITALS
OXYGEN SATURATION: 95 % | SYSTOLIC BLOOD PRESSURE: 130 MMHG | HEART RATE: 90 BPM | DIASTOLIC BLOOD PRESSURE: 82 MMHG | TEMPERATURE: 97.2 F

## 2024-08-01 DIAGNOSIS — M25.571 PAIN IN JOINT OF RIGHT FOOT: ICD-10-CM

## 2024-08-01 DIAGNOSIS — I89.0 LYMPHEDEMA: ICD-10-CM

## 2024-08-01 DIAGNOSIS — E66.9 OBESITY (BMI 30-39.9): ICD-10-CM

## 2024-08-01 DIAGNOSIS — G89.4 CHRONIC PAIN SYNDROME: ICD-10-CM

## 2024-08-01 DIAGNOSIS — S81.802D OPEN WOUND OF LEFT LOWER EXTREMITY, SUBSEQUENT ENCOUNTER: ICD-10-CM

## 2024-08-01 DIAGNOSIS — S81.801D OPEN WOUND OF RIGHT LOWER EXTREMITY, SUBSEQUENT ENCOUNTER: ICD-10-CM

## 2024-08-01 PROCEDURE — 3075F SYST BP GE 130 - 139MM HG: CPT | Performed by: STUDENT IN AN ORGANIZED HEALTH CARE EDUCATION/TRAINING PROGRAM

## 2024-08-01 PROCEDURE — 11042 DBRDMT SUBQ TIS 1ST 20SQCM/<: CPT | Performed by: STUDENT IN AN ORGANIZED HEALTH CARE EDUCATION/TRAINING PROGRAM

## 2024-08-01 PROCEDURE — 3079F DIAST BP 80-89 MM HG: CPT | Performed by: STUDENT IN AN ORGANIZED HEALTH CARE EDUCATION/TRAINING PROGRAM
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
## 2024-08-01 NOTE — LETTER
August 1, 2024    To Whom It May Concern:         This is confirmation that Karine Fox Dobbs attended her scheduled appointment with Beck Galeana M.D. on 8/01/24.         If you have any questions please do not hesitate to call me at the phone number listed below.    Sincerely,          Beck Galeana M.D.  [phone]

## 2024-08-01 NOTE — PATIENT INSTRUCTIONS
-Keep your wound dressing clean, dry, and intact. Only change dressing if it's over saturated, soiled or falls off and every Tuesday, Thursday, and Saturday.    - Resolved wound areas will be fragile for a few days to weeks, bathe and dry area gently, only ever regains a maximum of 80% of the tensile strength of the surrounding skin, remodeling of scar can continue for 6mo - a year.     -Should you experience any significant changes in your wound(s), such as infection (redness, swelling, localized heat, increased pain, fever > 101 F, chills) or have any questions regarding your home care instructions, please contact the wound center at [phone]. If after hours, contact your primary care physician or go to the hospital emergency room.

## 2024-08-01 NOTE — PROGRESS NOTES
Patient states she is wearing her compression pumps every day for 2 hours a day at home. Pt states she is not wearing her compression stockings and requested new measurements. Measurements completed on 7/2/2024. Pt given new info sheet with compression stocking information and where to order with her measurements included.

## 2024-08-02 DIAGNOSIS — E66.9 OBESITY (BMI 30-39.9): ICD-10-CM

## 2024-08-05 RX ORDER — PHENTERMINE HYDROCHLORIDE 37.5 MG/1
37.5 CAPSULE ORAL EVERY MORNING
Qty: 90 CAPSULE | Refills: 0 | Status: SHIPPED | OUTPATIENT
Start: 2024-08-05 | End: 2024-11-03

## 2024-08-06 ENCOUNTER — OFFICE VISIT (OUTPATIENT)
Dept: WOUND CARE | Facility: MEDICAL CENTER | Age: 59
End: 2024-08-06
Attending: INTERNAL MEDICINE
Payer: COMMERCIAL

## 2024-08-06 DIAGNOSIS — G89.4 CHRONIC PAIN SYNDROME: ICD-10-CM

## 2024-08-06 DIAGNOSIS — S81.801D OPEN WOUND OF RIGHT LOWER EXTREMITY, SUBSEQUENT ENCOUNTER: ICD-10-CM

## 2024-08-06 DIAGNOSIS — S81.802D OPEN WOUND OF LEFT LOWER EXTREMITY, SUBSEQUENT ENCOUNTER: ICD-10-CM

## 2024-08-06 DIAGNOSIS — I89.0 LYMPHEDEMA: ICD-10-CM

## 2024-08-06 DIAGNOSIS — E66.9 OBESITY (BMI 30-39.9): ICD-10-CM

## 2024-08-06 DIAGNOSIS — M25.571 PAIN IN JOINT OF RIGHT FOOT: ICD-10-CM

## 2024-08-06 PROCEDURE — 11042 DBRDMT SUBQ TIS 1ST 20SQCM/<: CPT

## 2024-08-06 PROCEDURE — 11042 DBRDMT SUBQ TIS 1ST 20SQCM/<: CPT | Performed by: NURSE PRACTITIONER
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
## 2024-08-07 NOTE — PROGRESS NOTES
Pt lost one of her CirCaid garment wraps.  She said if she can't find it she will purchase another pair.    Pt states she is using her lymphedema pumps for 2 hours daily.

## 2024-08-07 NOTE — PATIENT INSTRUCTIONS
After Visit Summary Wound Care Instructions    Nutrition - Patient instructed increased protein diet unless contraindicated in renal failure (meat, eggs, fish, yogurt, cottage cheese, beans), use of multivitamin with minerals and Arginaid supplementation (check if ok with Primary Care Provider first).    Infection -  instructed signs and symptoms of infection, increased redness and swelling, localized heat over wound and surrounding area/fever/chills/nausea and vomiting, when to call doctor or go to Emergency Room.     Dressing Changes  Instructed to keep dressings clean and dry, shower on clinic days right before coming in. Change your dressing if it becomes soiled, soaked, or falls off.    Questions - should you have any questions regarding your home care instructions, please contact the wound center at [phone]. If after hours, contact your primary care physician or go to the hospital emergency room.

## 2024-08-13 ENCOUNTER — OFFICE VISIT (OUTPATIENT)
Dept: WOUND CARE | Facility: MEDICAL CENTER | Age: 59
End: 2024-08-13
Attending: INTERNAL MEDICINE
Payer: COMMERCIAL

## 2024-08-13 DIAGNOSIS — Z91.198 FAILURE TO ATTEND APPOINTMENT WITH REASON GIVEN: ICD-10-CM

## 2024-08-13 PROCEDURE — 99999 PR NO CHARGE: CPT | Performed by: NURSE PRACTITIONER

## 2024-08-20 ENCOUNTER — OFFICE VISIT (OUTPATIENT)
Dept: WOUND CARE | Facility: MEDICAL CENTER | Age: 59
End: 2024-08-20
Attending: INTERNAL MEDICINE
Payer: COMMERCIAL

## 2024-08-20 VITALS
HEART RATE: 95 BPM | RESPIRATION RATE: 16 BRPM | DIASTOLIC BLOOD PRESSURE: 80 MMHG | TEMPERATURE: 97.3 F | OXYGEN SATURATION: 93 % | SYSTOLIC BLOOD PRESSURE: 128 MMHG

## 2024-08-20 DIAGNOSIS — S81.801D OPEN WOUND OF RIGHT LOWER EXTREMITY, SUBSEQUENT ENCOUNTER: ICD-10-CM

## 2024-08-20 DIAGNOSIS — T24.331D FULL THICKNESS BURN OF RIGHT LOWER LEG, SUBSEQUENT ENCOUNTER: ICD-10-CM

## 2024-08-20 DIAGNOSIS — G89.4 CHRONIC PAIN SYNDROME: ICD-10-CM

## 2024-08-20 DIAGNOSIS — S81.802D OPEN WOUND OF LEFT LOWER EXTREMITY, SUBSEQUENT ENCOUNTER: ICD-10-CM

## 2024-08-20 DIAGNOSIS — M25.571 PAIN IN JOINT OF RIGHT FOOT: ICD-10-CM

## 2024-08-20 DIAGNOSIS — E66.9 OBESITY (BMI 30-39.9): ICD-10-CM

## 2024-08-20 DIAGNOSIS — I89.0 LYMPHEDEMA: ICD-10-CM

## 2024-08-20 PROCEDURE — 16020 DRESS/DEBRID P-THICK BURN S: CPT

## 2024-08-20 PROCEDURE — 16020 DRESS/DEBRID P-THICK BURN S: CPT | Mod: 59 | Performed by: NURSE PRACTITIONER

## 2024-08-20 PROCEDURE — 11042 DBRDMT SUBQ TIS 1ST 20SQCM/<: CPT | Performed by: NURSE PRACTITIONER

## 2024-08-20 PROCEDURE — 99214 OFFICE O/P EST MOD 30 MIN: CPT | Mod: 25 | Performed by: NURSE PRACTITIONER

## 2024-08-20 PROCEDURE — 99214 OFFICE O/P EST MOD 30 MIN: CPT

## 2024-08-20 PROCEDURE — 11042 DBRDMT SUBQ TIS 1ST 20SQCM/<: CPT

## 2024-08-20 PROCEDURE — 3079F DIAST BP 80-89 MM HG: CPT | Performed by: NURSE PRACTITIONER

## 2024-08-20 PROCEDURE — 3074F SYST BP LT 130 MM HG: CPT | Performed by: NURSE PRACTITIONER
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
## 2024-08-21 NOTE — WOUND TEAM
Pt has a stage one to L lateral 5th MTH and to R plantar medial foot. Placed silicone adhesive foam and instructed pt to offload area, check feet often, and watch footwear as this stage is reversible.  
cover w plastic to keep dry/shower only

## 2024-08-21 NOTE — PATIENT INSTRUCTIONS
Reviewed POC, importance of keeping the secondary dressing dry and intact, nutrition for wound healing, compression therapy and when to remove, s/s of complications/infection, when to notify MD/go to ER.  Pt verbalized understanding to all.

## 2024-08-27 ENCOUNTER — OFFICE VISIT (OUTPATIENT)
Dept: WOUND CARE | Facility: MEDICAL CENTER | Age: 59
End: 2024-08-27
Attending: INTERNAL MEDICINE
Payer: COMMERCIAL

## 2024-08-27 DIAGNOSIS — E66.9 OBESITY (BMI 30-39.9): ICD-10-CM

## 2024-08-27 DIAGNOSIS — S81.801D OPEN WOUND OF RIGHT LOWER EXTREMITY, SUBSEQUENT ENCOUNTER: ICD-10-CM

## 2024-08-27 DIAGNOSIS — G89.4 CHRONIC PAIN SYNDROME: ICD-10-CM

## 2024-08-27 DIAGNOSIS — M25.571 PAIN IN JOINT OF RIGHT FOOT: ICD-10-CM

## 2024-08-27 DIAGNOSIS — S81.802D OPEN WOUND OF LEFT LOWER EXTREMITY, SUBSEQUENT ENCOUNTER: ICD-10-CM

## 2024-08-27 DIAGNOSIS — I89.0 LYMPHEDEMA: ICD-10-CM

## 2024-08-27 DIAGNOSIS — Z91.198 FAILURE TO ATTEND APPOINTMENT WITH REASON GIVEN: ICD-10-CM

## 2024-08-27 DIAGNOSIS — T24.331D FULL THICKNESS BURN OF RIGHT LOWER LEG, SUBSEQUENT ENCOUNTER: ICD-10-CM
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
## 2024-08-28 ENCOUNTER — OFFICE VISIT (OUTPATIENT)
Dept: WOUND CARE | Facility: MEDICAL CENTER | Age: 59
End: 2024-08-28
Attending: INTERNAL MEDICINE
Payer: COMMERCIAL

## 2024-08-28 VITALS
SYSTOLIC BLOOD PRESSURE: 110 MMHG | DIASTOLIC BLOOD PRESSURE: 78 MMHG | TEMPERATURE: 97.1 F | RESPIRATION RATE: 18 BRPM | HEART RATE: 103 BPM

## 2024-08-28 DIAGNOSIS — S81.801D OPEN WOUND OF RIGHT LOWER EXTREMITY, SUBSEQUENT ENCOUNTER: ICD-10-CM

## 2024-08-28 DIAGNOSIS — M25.571 PAIN IN JOINT OF RIGHT FOOT: ICD-10-CM

## 2024-08-28 DIAGNOSIS — G89.4 CHRONIC PAIN SYNDROME: ICD-10-CM

## 2024-08-28 DIAGNOSIS — S81.802D OPEN WOUND OF LEFT LOWER EXTREMITY, SUBSEQUENT ENCOUNTER: ICD-10-CM

## 2024-08-28 DIAGNOSIS — E66.9 OBESITY (BMI 30-39.9): ICD-10-CM

## 2024-08-28 DIAGNOSIS — T24.331D FULL THICKNESS BURN OF RIGHT LOWER LEG, SUBSEQUENT ENCOUNTER: ICD-10-CM

## 2024-08-28 DIAGNOSIS — I89.0 LYMPHEDEMA: ICD-10-CM

## 2024-08-28 PROCEDURE — 11042 DBRDMT SUBQ TIS 1ST 20SQCM/<: CPT

## 2024-08-28 PROCEDURE — 16020 DRESS/DEBRID P-THICK BURN S: CPT
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
## 2024-08-29 NOTE — PATIENT INSTRUCTIONS
-Keep your wound dressing clean, dry, and intact.     -Change your dressing every 3 to 4 days or if it becomes soiled, soaked, or falls off.    -Should you experience any significant changes in your wound(s), such as infection (redness, swelling, localized heat, increased pain, fever > 101 F, chills) or have any questions regarding your home care instructions, please contact the wound center at [phone]. If after hours, contact your primary care physician or go to the hospital emergency room.  If you are admitted to any hospital, you will need a new referral to come back to the wound clinic and any scheduled appointments that you already have, may be cancelled.

## 2024-09-04 ENCOUNTER — OFFICE VISIT (OUTPATIENT)
Dept: WOUND CARE | Facility: MEDICAL CENTER | Age: 59
End: 2024-09-04
Attending: INTERNAL MEDICINE
Payer: COMMERCIAL

## 2024-09-04 VITALS
SYSTOLIC BLOOD PRESSURE: 112 MMHG | HEART RATE: 66 BPM | DIASTOLIC BLOOD PRESSURE: 78 MMHG | TEMPERATURE: 97.5 F | RESPIRATION RATE: 18 BRPM

## 2024-09-04 DIAGNOSIS — S81.802D OPEN WOUND OF LEFT LOWER EXTREMITY, SUBSEQUENT ENCOUNTER: ICD-10-CM

## 2024-09-04 DIAGNOSIS — M25.571 PAIN IN JOINT OF RIGHT FOOT: ICD-10-CM

## 2024-09-04 DIAGNOSIS — S81.801D OPEN WOUND OF RIGHT LOWER EXTREMITY, SUBSEQUENT ENCOUNTER: ICD-10-CM

## 2024-09-04 DIAGNOSIS — E66.9 OBESITY (BMI 30-39.9): ICD-10-CM

## 2024-09-04 DIAGNOSIS — I89.0 LYMPHEDEMA: ICD-10-CM

## 2024-09-04 DIAGNOSIS — T24.331D FULL THICKNESS BURN OF RIGHT LOWER LEG, SUBSEQUENT ENCOUNTER: ICD-10-CM

## 2024-09-04 DIAGNOSIS — G89.4 CHRONIC PAIN SYNDROME: ICD-10-CM

## 2024-09-04 PROCEDURE — 11042 DBRDMT SUBQ TIS 1ST 20SQCM/<: CPT | Mod: 59 | Performed by: STUDENT IN AN ORGANIZED HEALTH CARE EDUCATION/TRAINING PROGRAM

## 2024-09-04 PROCEDURE — 3074F SYST BP LT 130 MM HG: CPT | Performed by: STUDENT IN AN ORGANIZED HEALTH CARE EDUCATION/TRAINING PROGRAM

## 2024-09-04 PROCEDURE — 3078F DIAST BP <80 MM HG: CPT | Performed by: STUDENT IN AN ORGANIZED HEALTH CARE EDUCATION/TRAINING PROGRAM

## 2024-09-04 PROCEDURE — 16020 DRESS/DEBRID P-THICK BURN S: CPT | Performed by: STUDENT IN AN ORGANIZED HEALTH CARE EDUCATION/TRAINING PROGRAM

## 2024-09-04 PROCEDURE — 11042 DBRDMT SUBQ TIS 1ST 20SQCM/<: CPT

## 2024-09-05 ENCOUNTER — TELEPHONE (OUTPATIENT)
Dept: WOUND CARE | Facility: MEDICAL CENTER | Age: 59
End: 2024-09-05
Payer: COMMERCIAL
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
## 2024-09-05 NOTE — TELEPHONE ENCOUNTER
Returned phone call to patient regarding wound care supplies and compression wraps. Patient stated that she called [facility] and was able to get a refill on her wound care supplies, but needed an order for the  for her compression wraps. Patient stated that she understood she may have to pay out of pocket. Order for [employer] Ready Wrap Fusion Liner placed with [facility].

## 2024-09-05 NOTE — PATIENT INSTRUCTIONS
After Visit Summary Wound Care Instructions    Nutrition - Patient instructed increased protein diet unless contraindicated in renal failure (meat, eggs, fish, yogurt, cottage cheese, beans), use of multivitamin with minerals and Arginaid supplementation (check if ok with Primary Care Provider first).    Infection -  instructed signs and symptoms of infection, increased redness and swelling, localized heat over wound and surrounding area/fever/chills/nausea and vomiting, when to call doctor or go to Emergency Room.     Dressing Changes - Instructed patient rationale for wound care products. Instructed to keep dressings clean and dry, shower on clinic days right before coming in. Change your dressing if it becomes soiled, soaked, or falls off.    Questions - should you have any questions regarding your home care instructions, please contact the wound center at [phone]. If after hours, contact your primary care physician or go to the hospital emergency room.   If you are admitted to any hospital, you will need a new referral to come back to the wound clinic and any scheduled appointments that you already have, may be cancelled.

## 2024-09-15 ENCOUNTER — APPOINTMENT (OUTPATIENT)
Dept: RADIOLOGY | Facility: IMAGING CENTER | Age: 59
End: 2024-09-15
Attending: PHYSICIAN ASSISTANT
Payer: COMMERCIAL

## 2024-09-15 ENCOUNTER — OFFICE VISIT (OUTPATIENT)
Dept: URGENT CARE | Facility: CLINIC | Age: 59
End: 2024-09-15
Payer: COMMERCIAL

## 2024-09-15 VITALS
DIASTOLIC BLOOD PRESSURE: 68 MMHG | BODY MASS INDEX: 34.07 KG/M2 | OXYGEN SATURATION: 94 % | RESPIRATION RATE: 20 BRPM | WEIGHT: 192.3 LBS | HEART RATE: 113 BPM | TEMPERATURE: 98.3 F | HEIGHT: 63 IN | SYSTOLIC BLOOD PRESSURE: 122 MMHG

## 2024-09-15 DIAGNOSIS — M25.552 LEFT HIP PAIN: ICD-10-CM

## 2024-09-15 DIAGNOSIS — M25.512 ACUTE PAIN OF LEFT SHOULDER: ICD-10-CM

## 2024-09-15 DIAGNOSIS — F11.20 UNCOMPLICATED OPIOID DEPENDENCE (HCC): ICD-10-CM
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
## 2024-09-15 PROCEDURE — 73502 X-RAY EXAM HIP UNI 2-3 VIEWS: CPT | Mod: TC,LT | Performed by: PHYSICIAN ASSISTANT

## 2024-09-15 PROCEDURE — 3074F SYST BP LT 130 MM HG: CPT | Performed by: PHYSICIAN ASSISTANT

## 2024-09-15 PROCEDURE — 99214 OFFICE O/P EST MOD 30 MIN: CPT | Performed by: PHYSICIAN ASSISTANT

## 2024-09-15 PROCEDURE — 3078F DIAST BP <80 MM HG: CPT | Performed by: PHYSICIAN ASSISTANT

## 2024-09-15 PROCEDURE — 73030 X-RAY EXAM OF SHOULDER: CPT | Mod: TC,LT | Performed by: PHYSICIAN ASSISTANT

## 2024-09-15 ASSESSMENT — ENCOUNTER SYMPTOMS
BACK PAIN: 0
LOSS OF CONSCIOUSNESS: 0
FALLS: 1
TINGLING: 0
NECK PAIN: 0

## 2024-09-15 ASSESSMENT — FIBROSIS 4 INDEX: FIB4 SCORE: 1.2

## 2024-09-15 NOTE — LETTER
[facility] [place] [address] [facility] [address]     September 15, 2024    Patient: Karine Ovalle   YOB: 1965   Date of Visit: 9/15/2024       To Whom It May Concern:    Karine Ovalle was seen and treated in our department on 9/15/2024.  Please excuse her from work days missed from 9/10/2024 - 9/15/2024    Sincerely,     [employer] ASHLIE Sharif.

## 2024-09-16 NOTE — PROGRESS NOTES
Subjective:     Karine Ovalle  is a 59 y.o. female who presents for Fall (Patient states that she tripped and fell. Poss sprain L shoulder, hip. Patient states that her injury happened last Tuesday. States that she tripped over a dog toy)       She presents today for left-sided shoulder and hip pain status post ground-level fall that occurred 6 days ago after tripping over a dog toy.  Is still having pain over both regions.  Limited range of motion in the left shoulder noted.  She does have a history of a total hip replacement on the left side.  She is established with [facility] for management of her chronic orthopedic conditions, is in pain management with a pain contract.  Did not strike her head during the fall, no loss of consciousness.  No numbness/tingling of the extremities.  Does use a cane for assistance.       Review of Systems   Musculoskeletal:  Positive for falls and joint pain. Negative for back pain and neck pain.   Neurological:  Negative for tingling and loss of consciousness.      Allergies   Allergen Reactions    Tobacco [Nicotiana Tabacum] Shortness of Breath     Cigarette smoke causes SOB, rispatory issues    Other Environmental      Cigarette smoke  Other reaction(s): Not available     Past Medical History:   Diagnosis Date    Administrative encounter- RTC ACCESS/ADA PARATRANSIT ELIGIBILITY 06/04/2019    Anemia     Anemia, chronic disease 06/02/2022    Arthritis     osteo/hips and back    At risk for falls     Back pain     Bronchitis     Cellulitis of right lower extremity 12/31/2021    Chickenpox     Chronic back pain     Cough     Dental disorder     lower denture    Edema 12/13/2018    Frequent headaches     History of gastric bypass 04/25/2012    Hoarseness, persistent     Hypokalemia 06/07/2018    Hyponatremia 05/18/2012    Left hip pain 09/18/2018    Leg edema 08/09/2021    Leukocytosis 04/03/2022    Microcytic anemia- postop THR 2/2019 12/13/2018    Mild intermittent asthma with acute  "exacerbation 04/25/2012    Morning headache     Multiple closed fractures of facial bone (HCC) 06/01/2022    Near syncope 06/01/2022    Obesity     Open wound of right lower extremity- needs wound vac 04/04/2022    Pain 12/04/2018    \"ALL OVER\", 10/10    Pain 02/04/2019    arthritic pain    Primary osteoarthritis of both hips- dr nowak; left THR done feb 6, 2019; right THR 3/2018 06/07/2018    Rhinitis     S/P hip replacement, bilateral 02/13/2019    Sepsis (HCC) 04/03/2022    Serum gamma globulin increased 06/08/2022    Shortness of breath     Swelling of lower extremity     Tonsillitis     Toothache     Urinary incontinence     using pads    Wheezing         Objective:   /68   Pulse (!) 113   Temp 36.8 °C (98.3 °F) (Temporal)   Resp 20   Ht 1.6 m (5' 3\")   Wt 87.2 kg (192 lb 4.8 oz)   LMP  (LMP Unknown)   SpO2 94%   BMI 34.06 kg/m²   Physical Exam  Vitals and nursing note reviewed.   Constitutional:       General: She is not in acute distress.     Appearance: She is not ill-appearing or toxic-appearing.   HENT:      Head: Normocephalic.      Nose: No rhinorrhea.   Eyes:      General: No scleral icterus.     Conjunctiva/sclera: Conjunctivae normal.   Pulmonary:      Effort: Pulmonary effort is normal. No respiratory distress.      Breath sounds: No stridor.   Musculoskeletal:      Cervical back: Neck supple.      Comments: Examination of the left shoulder does reveal tenderness palpation over the anterior shoulder and over the lateral deltoid region.  Shoulder range of motion limited due to pain    Examination of left hip does reveal tenderness palpation over the lateral aspect of the hip.   Neurological:      Mental Status: She is alert and oriented to person, place, and time.   Psychiatric:         Mood and Affect: Mood normal.         Behavior: Behavior normal.         Thought Content: Thought content normal.         Judgment: Judgment normal.             Diagnostic testing:    Left shoulder x-ray " series  Radiologist IMPRESSION:  1.  No radiographic evidence of acute traumatic injury.  2.  Osteopenia  3.  Osteoarthritis    Left hip x-ray series  Radiologist IMPRESSION:  No radiographic evidence of acute traumatic injury    Assessment/Plan:     Encounter Diagnoses   Name Primary?    Acute pain of left shoulder     Left hip pain     Ground-level fall     Uncomplicated opioid dependence (CMS-HCC)- spine nv           Plan for care for today's complaint includes obtaining left shoulder x-ray series and left hip x-ray series, both were negative for acute bony pathology.  Discussed with patient she needs to follow-up with orthopedics as she does have longstanding history of orthopedic conditions, degenerative joint disease and previous surgeries.  She also needs to follow-up with her pain management provider for pain management at this time.  Continue to use cane for ambulatory assistance. vital signs were stable during today's office visit, patient was overall well-appearing. Continue to monitor symptoms and return to urgent care or follow-up with primary care provider if symptoms remain ongoing.  Follow-up in the emergency department if symptoms become severe, ER precautions discussed in office today..    See AVS Instructions below for written guidance provided to patient on after-visit management and care in addition to our verbal discussion during the visit.    Please note that this dictation was created using voice recognition software. I have attempted to correct all errors, but there may be sound-alike, spelling, grammar and possibly content errors that I did not discover before finalizing the note.    Time spent evaluating the patient was 30 minutes which included preparing for the visit, obtaining history, examination, ordering labs/tests/procedures/medications, independent interpretation, discussion of plan, counseling/education, medical information reconciliation, and documentation into chart.       Amado  Chante BRANDON

## 2024-09-17 ENCOUNTER — APPOINTMENT (OUTPATIENT)
Dept: WOUND CARE | Facility: MEDICAL CENTER | Age: 59
End: 2024-09-17
Attending: INTERNAL MEDICINE
Payer: COMMERCIAL

## 2024-09-17 DIAGNOSIS — M50.30 DDD (DEGENERATIVE DISC DISEASE), CERVICAL: ICD-10-CM

## 2024-09-17 DIAGNOSIS — M51.369 DDD (DEGENERATIVE DISC DISEASE), LUMBAR: ICD-10-CM

## 2024-09-17 DIAGNOSIS — M54.42 CHRONIC BILATERAL LOW BACK PAIN WITH BILATERAL SCIATICA: ICD-10-CM

## 2024-09-17 DIAGNOSIS — M54.41 CHRONIC BILATERAL LOW BACK PAIN WITH BILATERAL SCIATICA: ICD-10-CM

## 2024-09-17 DIAGNOSIS — G89.29 CHRONIC BILATERAL LOW BACK PAIN WITH BILATERAL SCIATICA: ICD-10-CM

## 2024-09-17 RX ORDER — GABAPENTIN 800 MG/1
800 TABLET ORAL 3 TIMES DAILY
Qty: 270 TABLET | Refills: 0 | Status: SHIPPED | OUTPATIENT
Start: 2024-09-17

## 2024-09-17 RX ORDER — METHOCARBAMOL 500 MG/1
TABLET, FILM COATED ORAL
Qty: 240 TABLET | Refills: 0 | Status: SHIPPED | OUTPATIENT
Start: 2024-09-17

## 2024-09-17 NOTE — PATIENT INSTRUCTIONS
-Keep your wound dressing clean, dry, and intact.    -Change your dressing if it becomes soiled, soaked, or falls off.    -Remove your compression wrap if you have severe pain, severe swelling, numbness, color change, or temperature change in your toes. If you need to remove your compression wrap, do so by unrolling it. Do not cut the compression wrap off to prevent cutting yourself on accident.    -Wound vac may not have any drainage in tube or cannister & it will still be working.   Change cannister if it does become full by pressing tab on side of machine to remove canister and snap on new one. Full canister can be thrown in the trash. If cannister fills with bright red blood - go to ER. Dressing will be changed every MWF at the wound clini.  If you are having issues with your wound VAC, please consider patching leaks, changing the canister, or calling [phone] for troubleshooting. If the wound VAC has been off or un-operational for over 2 hours, call wound care center to inform them and remove all dressings including black foam and replace with normal saline damp gauze.     -Should you experience any significant changes in your wound(s), such as infection (redness, swelling, localized heat, increased pain, fever > 101 F, chills) or have any questions regarding your home care instructions, please contact the wound center at [phone]. If after hours, contact your primary care physician or go to the hospital emergency room.   
N/A

## 2024-09-19 RX ORDER — GABAPENTIN 800 MG/1
800 TABLET ORAL 3 TIMES DAILY
Qty: 270 TABLET | Refills: 0 | Status: CANCELLED | OUTPATIENT
Start: 2024-09-19

## 2024-09-19 NOTE — TELEPHONE ENCOUNTER
Received request via: Pharmacy    Was the patient seen in the last year in this department? Yes    Does the patient have an active prescription (recently filled or refills available) for medication(s) requested? No    Pharmacy Name: [facility]     Does the patient have long-term Plus and need 100-day supply? (This applies to ALL medications) Patient does not have SCP

## 2024-09-20 ENCOUNTER — NON-PROVIDER VISIT (OUTPATIENT)
Dept: WOUND CARE | Facility: MEDICAL CENTER | Age: 59
End: 2024-09-20
Attending: INTERNAL MEDICINE
Payer: COMMERCIAL

## 2024-09-20 PROCEDURE — 97602 WOUND(S) CARE NON-SELECTIVE: CPT

## 2024-09-20 NOTE — PROCEDURES
Non-selective debridement to leg wounds (2 right leg, 1 left leg) using gauze and hypochlorous acid, to remove loosely adherent nonviable tissue. Pt tolerated well.

## 2024-09-20 NOTE — PATIENT INSTRUCTIONS
-Keep your wound dressing clean, dry, and intact.     -Change your dressing if it becomes soiled, soaked, or falls off.    -If you need to change your dressings at home: Wash your wound with normal saline, wound cleanser, or unscented soap and water prior to applying your new dressings. Please avoid cleansing with hydrogen peroxide or rubbing alcohol. Hydrogen peroxide and rubbing alcohol are toxic to new tissue and skin cells.    -Remove your compression socks if you have severe pain, severe swelling, numbness, color change, or temperature change in your toes. DO NOT APPLY ANY COMPRESSION SOCK OR DRESSING OR COBAN TIGHTLY AROUND YOUR LEG.    -Should you experience any significant changes in your wound(s), such as signs of infection (increasing redness, swelling, localized heat, increased pain, fever > 101 F, chills) or have any questions regarding your home care instructions, please contact the wound center at [phone]. If after hours, contact your primary care physician or go to the hospital emergency room.     If you are admitted to any hospital, you will need a new referral to come back to the wound clinic and any scheduled appointments that you already have may be cancelled.     -If you are 5 or more minutes late for an appointment, we reserve the right to cancel and reschedule that appointment. Additionally, if you are habitually late or not attending appts (3 late cancellations and/or no shows), we reserve the right to cancel your remaining appointments and it will be your responsibility to obtain a new referral if services are still needed.

## 2024-09-24 ENCOUNTER — OFFICE VISIT (OUTPATIENT)
Dept: WOUND CARE | Facility: MEDICAL CENTER | Age: 59
End: 2024-09-24
Attending: INTERNAL MEDICINE
Payer: COMMERCIAL

## 2024-09-24 VITALS
TEMPERATURE: 96.9 F | RESPIRATION RATE: 18 BRPM | DIASTOLIC BLOOD PRESSURE: 80 MMHG | HEART RATE: 118 BPM | SYSTOLIC BLOOD PRESSURE: 110 MMHG

## 2024-09-24 DIAGNOSIS — E66.9 OBESITY (BMI 30-39.9): ICD-10-CM

## 2024-09-24 DIAGNOSIS — I89.0 LYMPHEDEMA: ICD-10-CM

## 2024-09-24 DIAGNOSIS — S81.802D OPEN WOUND OF LEFT LOWER EXTREMITY, SUBSEQUENT ENCOUNTER: ICD-10-CM

## 2024-09-24 DIAGNOSIS — S81.801D OPEN WOUND OF RIGHT LOWER EXTREMITY, SUBSEQUENT ENCOUNTER: ICD-10-CM

## 2024-09-24 DIAGNOSIS — G89.4 CHRONIC PAIN SYNDROME: ICD-10-CM

## 2024-09-24 DIAGNOSIS — T24.331D FULL THICKNESS BURN OF RIGHT LOWER LEG, SUBSEQUENT ENCOUNTER: ICD-10-CM

## 2024-09-24 DIAGNOSIS — M25.571 PAIN IN JOINT OF RIGHT FOOT: ICD-10-CM

## 2024-09-24 DIAGNOSIS — L97.412 ULCER OF RIGHT MIDFOOT WITH FAT LAYER EXPOSED (HCC): ICD-10-CM

## 2024-09-24 PROCEDURE — 11042 DBRDMT SUBQ TIS 1ST 20SQCM/<: CPT

## 2024-09-24 PROCEDURE — 3074F SYST BP LT 130 MM HG: CPT | Performed by: NURSE PRACTITIONER

## 2024-09-24 PROCEDURE — 16020 DRESS/DEBRID P-THICK BURN S: CPT | Performed by: NURSE PRACTITIONER

## 2024-09-24 PROCEDURE — 99213 OFFICE O/P EST LOW 20 MIN: CPT | Mod: 25 | Performed by: NURSE PRACTITIONER

## 2024-09-24 PROCEDURE — 99213 OFFICE O/P EST LOW 20 MIN: CPT

## 2024-09-24 PROCEDURE — 3079F DIAST BP 80-89 MM HG: CPT | Performed by: NURSE PRACTITIONER

## 2024-09-24 PROCEDURE — 11042 DBRDMT SUBQ TIS 1ST 20SQCM/<: CPT | Mod: 59 | Performed by: NURSE PRACTITIONER

## 2024-09-25 NOTE — PROGRESS NOTES
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 heater.  Recommend patient find alternative source for heat.  Examples provided.     Wound Care: Hydrofiber silver, silicone adhesive foam, tubigrip    PATIENT EDUCATION  - Etiology of venous stasis ulceration / lymphedema wounds discussed with patient  - Importance of managing edema for healing of ulcer, and for prevention of new ulcer development  -Need for lifelong compression of lower legs   -Elevate legs above the level of the heart periodically throughout the day.  - Importance of adequate nutrition for wound healing  -Advised to go to ER for any increased redness, swelling, drainage or odor, or if patient develops fever, chills, nausea or vomiting.    My total time spent caring for the patient on the day of the encounter was 20 minutes, reviewing history, assessment, counseling and education, and coordination of care. Including addressing and formulating plan of care for recurrent wound left lower extremity.  This does not include time spent on separately billable procedures/tests.    Please note that this note may have been created using voice recognition software. I have worked  with technical experts from [facility] to optimize the interface.  I have made every reasonable attempt to correct obvious errors, but there may be errors of grammar and possibly     N

## 2024-09-25 NOTE — PATIENT INSTRUCTIONS
-Keep your wound dressing clean, dry, and intact. Change your dressing if it becomes soiled, soaked, or falls off and every 3-4 days.     -Remove your compression stocking or wrap if you have severe pain, severe swelling, numbness, color change, or temperature change in your toes. If you need to remove your compression wrap or stocking, do so by unrolling it. Do not cut the compression stocking or wrap off to prevent cutting yourself on accident.    -Should you experience any significant changes in your wound(s), such as infection (redness, swelling, localized heat, increased pain, fever > 101 F, chills) or have any questions regarding your home care instructions, please contact the wound center at [phone]. If after hours, contact your primary care physician or go to the hospital emergency room.     If you are admitted to any hospital, you will need a new referral to come back to the wound clinic from your primary care or other provider. Any scheduled appointments that you already have, may be cancelled and will need to be updated.
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
## 2024-10-01 ENCOUNTER — APPOINTMENT (OUTPATIENT)
Dept: WOUND CARE | Facility: MEDICAL CENTER | Age: 59
End: 2024-10-01
Attending: INTERNAL MEDICINE
Payer: COMMERCIAL

## 2024-10-03 ENCOUNTER — APPOINTMENT (OUTPATIENT)
Dept: WOUND CARE | Facility: MEDICAL CENTER | Age: 59
End: 2024-10-03
Attending: INTERNAL MEDICINE
Payer: COMMERCIAL

## 2024-10-03 VITALS
TEMPERATURE: 97 F | HEART RATE: 113 BPM | DIASTOLIC BLOOD PRESSURE: 70 MMHG | SYSTOLIC BLOOD PRESSURE: 110 MMHG | RESPIRATION RATE: 18 BRPM

## 2024-10-03 DIAGNOSIS — S81.802D OPEN WOUND OF LEFT LOWER EXTREMITY, SUBSEQUENT ENCOUNTER: ICD-10-CM

## 2024-10-03 DIAGNOSIS — T24.331D FULL THICKNESS BURN OF RIGHT LOWER LEG, SUBSEQUENT ENCOUNTER: ICD-10-CM

## 2024-10-03 DIAGNOSIS — M25.571 PAIN IN JOINT OF RIGHT FOOT: ICD-10-CM

## 2024-10-03 DIAGNOSIS — I89.0 LYMPHEDEMA: ICD-10-CM

## 2024-10-03 DIAGNOSIS — G89.4 CHRONIC PAIN SYNDROME: ICD-10-CM

## 2024-10-03 DIAGNOSIS — S81.801D OPEN WOUND OF RIGHT LOWER EXTREMITY, SUBSEQUENT ENCOUNTER: ICD-10-CM

## 2024-10-03 DIAGNOSIS — E66.9 OBESITY (BMI 30-39.9): ICD-10-CM

## 2024-10-03 DIAGNOSIS — L97.412 ULCER OF RIGHT MIDFOOT WITH FAT LAYER EXPOSED (HCC): ICD-10-CM

## 2024-10-03 PROCEDURE — 11042 DBRDMT SUBQ TIS 1ST 20SQCM/<: CPT

## 2024-10-03 PROCEDURE — 3074F SYST BP LT 130 MM HG: CPT | Performed by: STUDENT IN AN ORGANIZED HEALTH CARE EDUCATION/TRAINING PROGRAM

## 2024-10-03 PROCEDURE — 16020 DRESS/DEBRID P-THICK BURN S: CPT

## 2024-10-03 PROCEDURE — 3078F DIAST BP <80 MM HG: CPT | Performed by: STUDENT IN AN ORGANIZED HEALTH CARE EDUCATION/TRAINING PROGRAM

## 2024-10-03 PROCEDURE — 99213 OFFICE O/P EST LOW 20 MIN: CPT

## 2024-10-03 PROCEDURE — 11042 DBRDMT SUBQ TIS 1ST 20SQCM/<: CPT | Mod: 59 | Performed by: STUDENT IN AN ORGANIZED HEALTH CARE EDUCATION/TRAINING PROGRAM

## 2024-10-03 PROCEDURE — 16020 DRESS/DEBRID P-THICK BURN S: CPT | Performed by: STUDENT IN AN ORGANIZED HEALTH CARE EDUCATION/TRAINING PROGRAM

## 2024-10-08 ENCOUNTER — APPOINTMENT (OUTPATIENT)
Dept: WOUND CARE | Facility: MEDICAL CENTER | Age: 59
End: 2024-10-08
Attending: INTERNAL MEDICINE
Payer: COMMERCIAL

## 2024-10-08 VITALS
RESPIRATION RATE: 20 BRPM | SYSTOLIC BLOOD PRESSURE: 116 MMHG | TEMPERATURE: 97.3 F | HEART RATE: 97 BPM | DIASTOLIC BLOOD PRESSURE: 80 MMHG

## 2024-10-08 DIAGNOSIS — I89.0 LYMPHEDEMA: ICD-10-CM

## 2024-10-08 DIAGNOSIS — M25.571 PAIN IN JOINT OF RIGHT FOOT: ICD-10-CM

## 2024-10-08 DIAGNOSIS — E66.9 OBESITY (BMI 30-39.9): ICD-10-CM

## 2024-10-08 DIAGNOSIS — G89.4 CHRONIC PAIN SYNDROME: ICD-10-CM

## 2024-10-08 DIAGNOSIS — S81.801D OPEN WOUND OF RIGHT LOWER EXTREMITY, SUBSEQUENT ENCOUNTER: ICD-10-CM

## 2024-10-08 DIAGNOSIS — T24.331D FULL THICKNESS BURN OF RIGHT LOWER LEG, SUBSEQUENT ENCOUNTER: ICD-10-CM

## 2024-10-08 DIAGNOSIS — S81.802D OPEN WOUND OF LEFT LOWER EXTREMITY, SUBSEQUENT ENCOUNTER: ICD-10-CM

## 2024-10-08 DIAGNOSIS — L97.412 ULCER OF RIGHT MIDFOOT WITH FAT LAYER EXPOSED (HCC): ICD-10-CM

## 2024-10-08 PROCEDURE — 11042 DBRDMT SUBQ TIS 1ST 20SQCM/<: CPT | Performed by: NURSE PRACTITIONER

## 2024-10-08 PROCEDURE — 16020 DRESS/DEBRID P-THICK BURN S: CPT

## 2024-10-08 PROCEDURE — 3074F SYST BP LT 130 MM HG: CPT | Performed by: NURSE PRACTITIONER

## 2024-10-08 PROCEDURE — 11042 DBRDMT SUBQ TIS 1ST 20SQCM/<: CPT

## 2024-10-08 PROCEDURE — 16020 DRESS/DEBRID P-THICK BURN S: CPT | Mod: 59 | Performed by: NURSE PRACTITIONER

## 2024-10-08 PROCEDURE — 3079F DIAST BP 80-89 MM HG: CPT | Performed by: NURSE PRACTITIONER

## 2024-10-15 ENCOUNTER — APPOINTMENT (OUTPATIENT)
Dept: WOUND CARE | Facility: MEDICAL CENTER | Age: 59
End: 2024-10-15
Attending: INTERNAL MEDICINE
Payer: COMMERCIAL

## 2024-10-15 PROCEDURE — 97602 WOUND(S) CARE NON-SELECTIVE: CPT

## 2024-10-22 ENCOUNTER — APPOINTMENT (OUTPATIENT)
Dept: WOUND CARE | Facility: MEDICAL CENTER | Age: 59
End: 2024-10-22
Attending: INTERNAL MEDICINE
Payer: COMMERCIAL

## 2024-10-27 DIAGNOSIS — M50.30 DDD (DEGENERATIVE DISC DISEASE), CERVICAL: ICD-10-CM

## 2024-10-27 DIAGNOSIS — M54.42 CHRONIC BILATERAL LOW BACK PAIN WITH BILATERAL SCIATICA: ICD-10-CM

## 2024-10-27 DIAGNOSIS — G89.29 CHRONIC BILATERAL LOW BACK PAIN WITH BILATERAL SCIATICA: ICD-10-CM

## 2024-10-27 DIAGNOSIS — M51.369 DDD (DEGENERATIVE DISC DISEASE), LUMBAR: ICD-10-CM

## 2024-10-27 DIAGNOSIS — M54.41 CHRONIC BILATERAL LOW BACK PAIN WITH BILATERAL SCIATICA: ICD-10-CM

## 2024-10-29 ENCOUNTER — APPOINTMENT (OUTPATIENT)
Dept: WOUND CARE | Facility: MEDICAL CENTER | Age: 59
End: 2024-10-29
Attending: INTERNAL MEDICINE
Payer: COMMERCIAL

## 2024-10-29 VITALS
HEART RATE: 95 BPM | OXYGEN SATURATION: 95 % | DIASTOLIC BLOOD PRESSURE: 65 MMHG | TEMPERATURE: 97.4 F | SYSTOLIC BLOOD PRESSURE: 110 MMHG | RESPIRATION RATE: 18 BRPM

## 2024-10-29 DIAGNOSIS — T24.331D FULL THICKNESS BURN OF RIGHT LOWER LEG, SUBSEQUENT ENCOUNTER: ICD-10-CM

## 2024-10-29 DIAGNOSIS — M25.571 PAIN IN JOINT OF RIGHT FOOT: ICD-10-CM

## 2024-10-29 DIAGNOSIS — I89.0 LYMPHEDEMA: ICD-10-CM

## 2024-10-29 DIAGNOSIS — L97.412 ULCER OF RIGHT MIDFOOT WITH FAT LAYER EXPOSED (HCC): ICD-10-CM

## 2024-10-29 DIAGNOSIS — G89.4 CHRONIC PAIN SYNDROME: ICD-10-CM

## 2024-10-29 DIAGNOSIS — E66.9 OBESITY (BMI 30-39.9): ICD-10-CM

## 2024-10-29 DIAGNOSIS — S81.801D OPEN WOUND OF RIGHT LOWER EXTREMITY, SUBSEQUENT ENCOUNTER: ICD-10-CM

## 2024-10-29 PROCEDURE — 16020 DRESS/DEBRID P-THICK BURN S: CPT

## 2024-10-29 PROCEDURE — 11055 PARING/CUTG B9 HYPRKER LES 1: CPT

## 2024-10-29 PROCEDURE — 11042 DBRDMT SUBQ TIS 1ST 20SQCM/<: CPT

## 2024-10-29 PROCEDURE — 99213 OFFICE O/P EST LOW 20 MIN: CPT

## 2024-10-30 ENCOUNTER — APPOINTMENT (OUTPATIENT)
Dept: RADIOLOGY | Facility: IMAGING CENTER | Age: 59
End: 2024-10-30
Attending: PHYSICIAN ASSISTANT
Payer: COMMERCIAL

## 2024-10-30 ENCOUNTER — OFFICE VISIT (OUTPATIENT)
Dept: URGENT CARE | Facility: CLINIC | Age: 59
End: 2024-10-30
Payer: COMMERCIAL

## 2024-10-30 VITALS
HEIGHT: 64 IN | SYSTOLIC BLOOD PRESSURE: 136 MMHG | RESPIRATION RATE: 16 BRPM | WEIGHT: 180 LBS | BODY MASS INDEX: 30.73 KG/M2 | DIASTOLIC BLOOD PRESSURE: 84 MMHG | HEART RATE: 110 BPM | OXYGEN SATURATION: 96 % | TEMPERATURE: 98.1 F

## 2024-10-30 DIAGNOSIS — S29.9XXA TRAUMATIC INJURY OF RIB: ICD-10-CM

## 2024-10-30 PROCEDURE — 71101 X-RAY EXAM UNILAT RIBS/CHEST: CPT | Mod: TC,LT | Performed by: PHYSICIAN ASSISTANT

## 2024-10-30 PROCEDURE — 3075F SYST BP GE 130 - 139MM HG: CPT | Performed by: PHYSICIAN ASSISTANT

## 2024-10-30 PROCEDURE — 3079F DIAST BP 80-89 MM HG: CPT | Performed by: PHYSICIAN ASSISTANT

## 2024-10-30 PROCEDURE — 99214 OFFICE O/P EST MOD 30 MIN: CPT | Performed by: PHYSICIAN ASSISTANT

## 2024-10-30 RX ORDER — KETOROLAC TROMETHAMINE 15 MG/ML
15 INJECTION, SOLUTION INTRAMUSCULAR; INTRAVENOUS ONCE
Status: COMPLETED | OUTPATIENT
Start: 2024-10-30 | End: 2024-10-30

## 2024-10-30 RX ORDER — LIDOCAINE 50 MG/G
2 PATCH TOPICAL EVERY 12 HOURS
Qty: 30 PATCH | Refills: 0 | Status: SHIPPED | OUTPATIENT
Start: 2024-10-30

## 2024-10-30 RX ORDER — KETOROLAC TROMETHAMINE 15 MG/ML
15 INJECTION, SOLUTION INTRAMUSCULAR; INTRAVENOUS ONCE
Status: DISCONTINUED | OUTPATIENT
Start: 2024-10-30 | End: 2024-10-30

## 2024-10-30 RX ADMIN — KETOROLAC TROMETHAMINE 15 MG: 15 INJECTION, SOLUTION INTRAMUSCULAR; INTRAVENOUS at 21:28

## 2024-10-30 ASSESSMENT — FIBROSIS 4 INDEX: FIB4 SCORE: 1.2

## 2024-10-30 NOTE — LETTER
October 30, 2024         Patient: Karine Ovalle   YOB: 1965   Date of Visit: 10/30/2024           To Whom it May Concern:    Karine Ovalle was seen in my clinic on 10/30/2024. She should be excused from work today for medical reasons.    If you have any questions or concerns, please don't hesitate to call.        Sincerely,           Mindy Gagnon P.A.-C.  Electronically Signed

## 2024-11-01 RX ORDER — METHOCARBAMOL 500 MG/1
TABLET, FILM COATED ORAL
Qty: 240 TABLET | Refills: 0 | Status: SHIPPED | OUTPATIENT
Start: 2024-11-01

## 2024-11-03 ASSESSMENT — ENCOUNTER SYMPTOMS
VOMITING: 0
FEVER: 0
NAUSEA: 0
DIARRHEA: 0
SENSORY CHANGE: 0
ABDOMINAL PAIN: 0
COUGH: 0
FOCAL WEAKNESS: 0
CHILLS: 0
ARTHRALGIAS: 1
TINGLING: 0
FLANK PAIN: 0

## 2024-11-03 NOTE — PROGRESS NOTES
"Subjective     Karine Ovalle is a 59 y.o. female who presents with Rib Injury (Pt slipped and hit left side of torso on bathtub x1 day)            Rib Injury  This is a new problem. The current episode started today (patient fell in tub onto left anterior rib.). The problem occurs constantly. The problem has been unchanged. Associated symptoms include arthralgias (left anterior rib cage). Pertinent negatives include no abdominal pain, chest pain, chills, coughing, fever, nausea, rash or vomiting. Exacerbated by: movement, deep breathing. She has tried oral narcotics for the symptoms. The treatment provided no relief.   Patient on chronic pain medication through pain management. She states her pain medicine is not helping with the pain.    Past Medical History:   Diagnosis Date    Administrative encounter- RTC ACCESS/ADA PARATRANSIT ELIGIBILITY 06/04/2019    Anemia     Anemia, chronic disease 06/02/2022    Arthritis     osteo/hips and back    At risk for falls     Back pain     Bronchitis     Cellulitis of right lower extremity 12/31/2021    Chickenpox     Chronic back pain     Cough     Dental disorder     lower denture    Edema 12/13/2018    Frequent headaches     History of gastric bypass 04/25/2012    Hoarseness, persistent     Hypokalemia 06/07/2018    Hyponatremia 05/18/2012    Left hip pain 09/18/2018    Leg edema 08/09/2021    Leukocytosis 04/03/2022    Microcytic anemia- postop THR 2/2019 12/13/2018    Mild intermittent asthma with acute exacerbation 04/25/2012    Morning headache     Multiple closed fractures of facial bone (HCC) 06/01/2022    Near syncope 06/01/2022    Obesity     Open wound of right lower extremity- needs wound vac 04/04/2022    Pain 12/04/2018    \"ALL OVER\", 10/10    Pain 02/04/2019    arthritic pain    Primary osteoarthritis of both hips- dr nowak; left THR done feb 6, 2019; right THR 3/2018 06/07/2018    Rhinitis     S/P hip replacement, bilateral 02/13/2019    Sepsis (HCC) " "04/03/2022    Serum gamma globulin increased 06/08/2022    Shortness of breath     Swelling of lower extremity     Tonsillitis     Toothache     Urinary incontinence     using pads    Wheezing        Past Surgical History:   Procedure Laterality Date    HIP ARTHROPLASTY TOTAL Left 02/13/2019    Procedure: HIP ARTHROPLASTY TOTAL, Cabling of intra-operative calcar fracture;  Surgeon: Bryon Levine M.D.;  Location: [facility];  Service: Orthopedics    CERVICAL FUSION POSTERIOR  12/07/2018    Procedure: CERVICAL FUSION POSTERIOR- STAGE #2 C3-5 AND C3-T1;  Surgeon: Emre Mendoza III, M.D.;  Location: SUR[facility];  Service: Neurosurgery    CERVICAL LAMINECTOMY POSTERIOR  12/07/2018    Procedure: CERVICAL LAMINECTOMY POSTERIOR C2-T1;  Surgeon: Emre Mendoza III, M.D.;  Location: [facility];  Service: Neurosurgery    CERVICAL DISK AND FUSION ANTERIOR  12/05/2018    Procedure: CERVICAL DISK AND FUSION ANTERIOR-STAGE #1 C4-5;  Surgeon: Emre Mendoza III, M.D.;  Location: [facility];  Service: Neurosurgery    CORPECTOMY  12/05/2018    Procedure: CORPECTOMY;  Surgeon: Emre Mendoza III, M.D.;  Location: SURGERY [facility];  Service: Neurosurgery    HIP ARTHROPLASTY TOTAL Right 03/20/2018    Procedure: HIP ARTHROPLASTY TOTAL;  Surgeon: Bryon Levine M.D.;  Location: [facility];  Service: Orthopedics    KNEE ARTHROPLASTY TOTAL Right 10/20/2015    Procedure: KNEE ARTHROPLASTY TOTAL;  Surgeon: Bryon Levine M.D.;  Location: [facility];  Service:     APPENDECTOMY LAPAROSCOPIC  03/21/2013    Performed by Dali Queen M.D. at [facility]    DEBRIDEMENT  05/17/2012    Performed by BRADLEY DYKES at [facility]    PLASTIC SURGERY  2004    excess skin on arms and legs removed    MAMMOPLASTY AUGMENTATION Bilateral 2004    breast implants and \"tummy tuck\"    GASTRIC BYPASS LAPAROSCOPIC  2002    x 2    ABDOMINAL " "EXPLORATION      APPENDECTOMY      ARTHROSCOPY, KNEE      HIP REPLACEMENT, TOTAL      OTHER      breast augmentation 2004    OTHER      Gastric bypass 2002    PRIMARY C SECTION      SLEEVE,OSEI VASO THIGH      TONSILLECTOMY         Family History   Problem Relation Age of Onset    Diabetes Mother     Heart Disease Mother      Allergies:  Tobacco [nicotiana tabacum] and Other environmental    Medications, Allergies, and current problem list reviewed today in Epic      Review of Systems   Constitutional:  Negative for chills, fever and malaise/fatigue.   Respiratory:  Negative for cough.    Cardiovascular:  Negative for chest pain.   Gastrointestinal:  Negative for abdominal pain, diarrhea, nausea and vomiting.   Genitourinary:  Negative for flank pain and hematuria.   Musculoskeletal:  Positive for arthralgias (left anterior rib cage).        Left anterior rib pain    Skin:  Negative for rash.   Neurological:  Negative for tingling, sensory change and focal weakness.     All other systems reviewed and are negative.            Objective     /84 (BP Location: Right arm, Patient Position: Sitting, BP Cuff Size: Large adult)   Pulse (!) 110   Temp 36.7 °C (98.1 °F) (Temporal)   Resp 16   Ht 1.613 m (5' 3.5\")   Wt 81.6 kg (180 lb)   LMP  (LMP Unknown)   SpO2 96%   BMI 31.39 kg/m²      Physical Exam  Constitutional:       General: She is not in acute distress.     Appearance: She is not ill-appearing.   HENT:      Head: Normocephalic and atraumatic.   Eyes:      Conjunctiva/sclera: Conjunctivae normal.   Cardiovascular:      Rate and Rhythm: Regular rhythm. Tachycardia present.      Heart sounds: Normal heart sounds.   Pulmonary:      Effort: Pulmonary effort is normal. No respiratory distress.      Breath sounds: Normal breath sounds. No wheezing, rhonchi or rales.   Chest:       Skin:     General: Skin is warm and dry.      Findings: Bruising present.   Neurological:      General: No focal deficit present. "      Mental Status: She is alert and oriented to person, place, and time.   Psychiatric:         Mood and Affect: Mood normal.         Behavior: Behavior normal.         Thought Content: Thought content normal.         Judgment: Judgment normal.                10/30/2024 8:38 PM     HISTORY/REASON FOR EXAM:  Pain Following Trauma.        TECHNIQUE/EXAM DESCRIPTION AND NUMBER OF VIEWS:  3 images of the left ribs and chest.     COMPARISON: 2/16/2024     FINDINGS:  The cardiomediastinal silhouette is stable. There is a small nodular density overlying the right mid lung field laterally. On the prior exam, this may be lateral to the chest wall suggesting this may be extrathoracic. There is no pulmonary edema, focal   area of consolidation, pleural effusion, or pneumothorax. Spinal fusion hardware is partially seen in the neck. No pneumothorax is evident.     IMPRESSION:     No definite rib fracture.                Assessment & Plan        Assessment & Plan  Traumatic injury of rib    Orders:    [id number] (WITH 1-VIEW CXR) LEFT; Future    ketorolac (Toradol) 15 MG/ML injection 15 mg    lidocaine (LIDODERM) 5 % Patch; Place 2 Patches on the skin every 12 hours. Leave on for 12 hours and off for 12 hours     Work note provided.     Differential diagnoses, Supportive care, and indications for immediate follow-up discussed with patient.   Pathogenesis of diagnosis discussed including typical length and natural progression.   Instructed to return to clinic or nearest emergency department for any change in condition, further concerns, or worsening of symptoms.    My total time spent caring for the patient on the day of the encounter was 33 minutes.   This does not include time spent on separately billable procedures/tests.      The patient demonstrated a good understanding and agreed with the treatment plan.    Mindy Gagnon P.A.-C.

## 2024-11-06 ENCOUNTER — APPOINTMENT (OUTPATIENT)
Dept: WOUND CARE | Facility: MEDICAL CENTER | Age: 59
End: 2024-11-06
Attending: INTERNAL MEDICINE
Payer: COMMERCIAL

## 2024-11-06 NOTE — PROGRESS NOTES
Late Cancel - patient arrived to appointment 6 minutes late, then went into the restroom. Nursing Supervisor explained to patient that she is scheduled for a 30 min appointment, and there is now less than 15 min to provide care.     Patient provided with dressing supplies and reminded to be on time for next scheduled clinic appointment.

## 2024-11-11 NOTE — PROGRESS NOTES
Subjective:      Karine Ovalle is a 52 y.o. female who presents with Other (needs FMLA Paperwork filled out)  AND   The patient is here for followup of chronic medical problems listed below. The patient is compliant with medications and having no side effects from them. Denies chest pain, abdominal pain, dyspnea, myalgias, or cough.   Patient Active Problem List    Diagnosis Date Noted   • Mild intermittent asthma without complication 04/25/2012     Priority: High   • Lymphedema 10/17/2017   • Debility- FMLA through april 2018 10/12/2017   • Venous insufficiency 10/03/2017   • Cellulitis and abscess of leg- right lower inner- wound clinic 08/09/2017   • Chronic use of opiate drugs therapeutic purposes 08/09/2017   • Colon cancer screening- needs 08/09/2017   • Healthcare maintenance-needs Pap smear and colon cancer screening 08/09/2017   • Administrative encounter- FMLA paperwork fill out with pt asst 07/26/2017   • Chronic bilateral low back pain with bilateral sciatica- pain mgt, dr sanders 07/26/2017   • Chronic pain syndrome- nv adv pain, dr sanders 06/08/2017   • Chronic use of opiate for therapeutic purpose- nv adv pain dr sanders following 06/08/2017   • Severe obesity (BMI >= 40) (CMS-HCC) 10/21/2016   • Vitamin B 12 deficiency 06/02/2016   • Weight gain status post gastric bypass- in [place] 2002; was 320 lbs- got down to 155 08/12/2014   • Fibroids 08/13/2013   • Primary osteoarthritis of right knee- sp right TKR 2015 - dr nowak 08/13/2013     No Known Allergies  Outpatient Medications Prior to Visit   Medication Sig Dispense Refill   • cyclobenzaprine (FLEXERIL) 5 MG tablet Take 1-2 Tabs by mouth at bedtime as needed for Moderate Pain. 20 Tab 0   • oxycodone-acetaminophen (PERCOCET-10)  MG Tab Take 1-2 Tabs by mouth every four hours as needed for Severe Pain.     • morphine ER (MS CONTIN) 15 MG Tab CR tablet Take 15 mg by mouth every 12 hours.     • phentermine 37.5 MG capsule Take 1 Cap by  "mouth every morning. 90 Cap 1   • furosemide (LASIX) 20 MG Tab Take 1 Tab by mouth every day. 90 Tab 4   • potassium chloride ER (KLOR-CON) 10 MEQ tablet Take 1 Tab by mouth every day. 90 Tab 4   • olopatadine (PATANOL) 0.1 % ophthalmic solution Place 1 Drop in both eyes 2 times a day. 5 mL 11   • oxycodone immediate-release (ROXICODONE) 5 MG Tab Take 1 Tab by mouth every 8 hours as needed for Severe Pain. 90 Tab 0   • cyanocobalamin (VITAMIN B12) 1000 MCG Tab Take 1 Tab by mouth every day. 90 Tab 4   • Lidocaine HCl 3 % Cream Apply cream to affected area 2 times daily as needed 1 Tube 0   • meloxicam (MOBIC) 15 MG tablet 1 tab po qday with food for djd 90 Tab 1   • Omega-3 Fatty Acids (FISH OIL) 1000 MG Cap capsule Take 3,000 mg by mouth every day.     • multivitamin (THERAGRAN) Tab Take 1 Tab by mouth every day.     • vitamin D (CHOLECALCIFEROL) 1000 UNIT Tab Take 1,000 Units by mouth every day.     • cephALEXin (KEFLEX) 500 MG Cap Take 1 Cap by mouth 4 times a day. 40 Cap 0     No facility-administered medications prior to visit.                HPI    Review of Systems   Constitutional: Negative.    HENT: Negative.    Eyes: Negative.    Respiratory: Negative.    Cardiovascular: Negative.    Gastrointestinal: Negative.    Genitourinary: Negative.    Musculoskeletal: Negative.    Skin: Negative.    Neurological: Negative.    Endo/Heme/Allergies: Negative.    Psychiatric/Behavioral: Negative.           Objective:     /70   Pulse 99   Temp 36.8 °C (98.2 °F)   Ht 1.575 m (5' 2.01\")   Wt 104.3 kg (230 lb)   LMP 05/17/2017   SpO2 96%   BMI 42.06 kg/m²      Physical Exam   Constitutional: She is oriented to person, place, and time. She appears well-developed and well-nourished. No distress.   HENT:   Head: Normocephalic and atraumatic.   Right Ear: External ear normal.   Left Ear: External ear normal.   Nose: Nose normal.   Mouth/Throat: Oropharynx is clear and moist. No oropharyngeal exudate.   Eyes: " Conjunctivae and EOM are normal. Pupils are equal, round, and reactive to light. Right eye exhibits no discharge. Left eye exhibits no discharge. No scleral icterus.   Neck: Normal range of motion. Neck supple. No JVD present. No tracheal deviation present. No thyromegaly present.   Cardiovascular: Normal rate, regular rhythm, normal heart sounds and intact distal pulses.  Exam reveals no gallop and no friction rub.    No murmur heard.  Pulmonary/Chest: Effort normal and breath sounds normal. No stridor. No respiratory distress. She has no wheezes. She has no rales. She exhibits no tenderness.   Abdominal: Soft. Bowel sounds are normal. She exhibits no distension and no mass. There is no tenderness. There is no rebound and no guarding.   Musculoskeletal: Normal range of motion. She exhibits no edema or tenderness.   Lymphadenopathy:     She has no cervical adenopathy.   Neurological: She is alert and oriented to person, place, and time. She has normal reflexes. She displays normal reflexes. No cranial nerve deficit. She exhibits normal muscle tone. Coordination normal.   Skin: Skin is warm and dry. No rash noted. She is not diaphoretic. No erythema. No pallor.   Psychiatric: She has a normal mood and affect. Her behavior is normal. Judgment and thought content normal.   Vitals reviewed.    No visits with results within 1 Month(s) from this visit.   Latest known visit with results is:   Hospital Outpatient Visit on 08/09/2017   Component Date Value   • Urine Amphetamine-Metham* 08/10/2017 Positive    • Barbiturates 08/10/2017 Negative    • Benzodiazepines 08/10/2017 Negative    • Buprenorphine, Urn, Scrn 08/10/2017 Negative    • Carisoprodol, Urn, Scrn 08/10/2017 Negative    • Cocaine Metabolite 08/10/2017 Negative    • Ethyl Glucuronide Scrn, * 08/10/2017 Negative    • Fentanyl, Urn, Scrn 08/10/2017 Negative    • Meperidine, Urn, Scrn 08/10/2017 Negative    • Methadone 08/10/2017 Negative    • Opiates, Urn, Scrn  08/10/2017 Positive    • Oxycodone/Oxymorphone, U* 08/10/2017 Positive    • Phencyclidine -Pcp 08/10/2017 Negative    • Propoxyphene 08/10/2017 Negative    • Tapentadol, Urn, Scrn 08/10/2017 Negative    • Cannabinoid Metab 08/10/2017 Negative    • Tramadol, Urn, Scrn 08/10/2017 Negative    • Zolpidem, Urn, Scrn 08/10/2017 Negative    • Scrn, Urine Interpretati* 08/10/2017 See Note    • Opiates, Ur, Hydrocodone 08/12/2017 <20    • Opiates, Ur, Hydromorpho* 08/12/2017 <20    • Opiates, Ur, Codeine 08/12/2017 <20    • Opiates, Ur, Morphine 08/12/2017 <20    • Opiates, Ur, 6_AM 08/12/2017 <10    • Opiates, Ur, Noroxycodone 08/12/2017 >4000    • Opiates, Ur, Noroxymorph* 08/12/2017 223    • Opiates, Ur, Oxycodone 08/12/2017 2179    • Opiates, Ur, Oxymorphone 08/12/2017 46    • Opiates, Ur, Norhydrocod* 08/12/2017 <20    • Amphetamine, Urine 08/14/2017 <200    • Methamphetamine, Urine 08/14/2017 <200    • Methylenedioxyamphetamin* 08/14/2017 <200    • Methylenedioxymethamphet* 08/14/2017 <200    • Methylenedioxyethylamphe* 08/14/2017 <200       Lab Results   Component Value Date/Time    HBA1C 5.3 06/12/2017 09:03 AM     Lab Results   Component Value Date/Time    SODIUM 134 (L) 06/12/2017 09:02 AM    POTASSIUM 3.9 06/12/2017 09:02 AM    CHLORIDE 102 06/12/2017 09:02 AM    CO2 26 06/12/2017 09:02 AM    GLUCOSE 96 06/12/2017 09:02 AM    BUN 11 06/12/2017 09:02 AM    CREATININE 0.75 06/12/2017 09:02 AM    CREATININE 0.88 08/14/2010 12:00 AM    BUNCREATRAT 13 08/14/2010 12:00 AM    GLOMRATE >59 08/14/2010 12:00 AM    ALKPHOSPHAT 100 (H) 06/12/2017 09:02 AM    ASTSGOT 18 06/12/2017 09:02 AM    ALTSGPT 10 06/12/2017 09:02 AM    TBILIRUBIN 0.7 06/12/2017 09:02 AM     Lab Results   Component Value Date/Time    INR 1.04 05/18/2012 12:20 PM     Lab Results   Component Value Date/Time    CHOLSTRLTOT 144 06/12/2017 09:02 AM    LDL 63 06/12/2017 09:02 AM    HDL 69 06/12/2017 09:02 AM    TRIGLYCERIDE 60 06/12/2017 09:02 AM       No  results found for: TESTOSTERONE  Lab Results   Component Value Date/Time    TSH 2.110 08/14/2010 12:00 AM     Lab Results   Component Value Date/Time    FREET4 0.72 05/03/2016 07:32 AM     Lab Results   Component Value Date/Time    URICACID 5.6 08/12/2014 04:49 PM     No components found for: VITB12  Lab Results   Component Value Date/Time    25HYDROXY 33 08/12/2014 04:49 PM    25HYDROXY 25 (L) 07/03/2012 09:20 AM   '          Assessment/Plan:     1. Cellulitis and abscess of leg- right lower inner- wound clinic       Under good control. Continue same regimen.  2. Administrative encounter- FMLA paperwork fill out with pt asst    Under good control. Continue same regimen.    3. Chronic bilateral low back pain with bilateral sciatica- pain mgt, dr sanders   Under good control. Continue same regimen.  4. Lymphedema     Under good control. Continue same regimen.    5. Chronic pain syndrome- nv adv pain, dr sanders   Under good control. Continue same regimen.  6. Severe obesity (BMI >= 40) (CMS-HCC)    diet/exercise/lose 15 lbs.; patient counseled    - OBESITY COUNSELING (No Charge): Patient identified as having weight management issue.  Appropriate orders and counseling given.    7. Colon cancer screening- needs         - REFERRAL TO GI FOR COLONOSCOPY  - OCCULT BLOOD FECES IMMUNOASSAY (FIT); Future      40 minute face-to-face encounter took place today.  More than half of this time was spent in the coordination of care of the above problems, as well as counseling.         Left inguinal hernia repair with mesh

## 2024-11-12 ENCOUNTER — APPOINTMENT (OUTPATIENT)
Dept: WOUND CARE | Facility: MEDICAL CENTER | Age: 59
End: 2024-11-12
Attending: INTERNAL MEDICINE
Payer: COMMERCIAL

## 2024-11-12 PROCEDURE — 97602 WOUND(S) CARE NON-SELECTIVE: CPT

## 2024-11-13 NOTE — PATIENT INSTRUCTIONS
-Change your dressing as instructed and immediately if it becomes soiled, soaked, or falls off.    -Should you experience any significant changes in your wound(s), such as infection (redness, swelling, localized heat, increased pain, fever > 101 F, chills) or have any questions regarding your home care instructions, please contact the wound center at [phone]. If after hours, contact your primary care physician or go to the hospital emergency room.

## 2024-11-13 NOTE — WOUND TEAM
Non selective debridement with gauze and hypochlorous acid to RLE wounds to remove adherent biofilm. Patient refusing tubi , states that she will apply her own compression socks when she gets home. Patient requested for a piece of non adhesive foam to be placed to right medial foot over dry callus.

## 2024-11-19 ENCOUNTER — APPOINTMENT (OUTPATIENT)
Dept: WOUND CARE | Facility: MEDICAL CENTER | Age: 59
End: 2024-11-19
Attending: INTERNAL MEDICINE
Payer: COMMERCIAL

## 2024-11-19 RX ORDER — GABAPENTIN 800 MG/1
800 TABLET ORAL 3 TIMES DAILY
Qty: 270 TABLET | Refills: 0 | Status: SHIPPED | OUTPATIENT
Start: 2024-11-19

## 2024-11-19 NOTE — TELEPHONE ENCOUNTER
Received request via: Pharmacy    Was the patient seen in the last year in this department? Yes    Does the patient have an active prescription (recently filled or refills available) for medication(s) requested? No    Pharmacy Name: [facility]    Does the patient have longterm Plus and need 100-day supply? (This applies to ALL medications) Patient does not have SCP

## 2024-11-26 ENCOUNTER — APPOINTMENT (OUTPATIENT)
Dept: WOUND CARE | Facility: MEDICAL CENTER | Age: 59
End: 2024-11-26
Attending: INTERNAL MEDICINE
Payer: COMMERCIAL

## 2024-11-26 DIAGNOSIS — I89.0 LYMPHEDEMA: ICD-10-CM

## 2024-11-26 DIAGNOSIS — S81.801D OPEN WOUND OF RIGHT LOWER EXTREMITY, SUBSEQUENT ENCOUNTER: ICD-10-CM

## 2024-11-26 DIAGNOSIS — T24.331D FULL THICKNESS BURN OF RIGHT LOWER LEG, SUBSEQUENT ENCOUNTER: ICD-10-CM

## 2024-11-26 DIAGNOSIS — S81.802D OPEN WOUND OF LEFT LOWER EXTREMITY, SUBSEQUENT ENCOUNTER: ICD-10-CM

## 2024-11-27 NOTE — PROGRESS NOTES
Brief Wound Care Provider Note    Patient arrived late to clinic appointment today. Was not seen. She has history of being habitually late to clinic appointments. Unfortunately due to her noncompliance with compression and inability to attend appointments in reasonable time frame, will discharge her from clinic. I will place referral to outside wound clinic and recommend she seek further care at [facility] wound care or [facility] wound care moving forward. She was provided dressings today for her to manage on her own. Patient was informed of decision by clinic management.

## 2024-11-27 NOTE — PROGRESS NOTES
Patient arrived late for wound care appointment today, not seen in clinic. Provided patient with wound care supplies for dressing changed per Dr. Galeana.

## 2024-12-03 ENCOUNTER — APPOINTMENT (OUTPATIENT)
Dept: WOUND CARE | Facility: MEDICAL CENTER | Age: 59
End: 2024-12-03
Attending: INTERNAL MEDICINE
Payer: COMMERCIAL

## 2024-12-10 ENCOUNTER — APPOINTMENT (OUTPATIENT)
Dept: WOUND CARE | Facility: MEDICAL CENTER | Age: 59
End: 2024-12-10
Attending: INTERNAL MEDICINE
Payer: COMMERCIAL

## 2024-12-11 ENCOUNTER — APPOINTMENT (OUTPATIENT)
Dept: MEDICAL GROUP | Age: 59
End: 2024-12-11
Payer: COMMERCIAL

## 2024-12-11 VITALS
BODY MASS INDEX: 30.39 KG/M2 | HEART RATE: 113 BPM | DIASTOLIC BLOOD PRESSURE: 70 MMHG | SYSTOLIC BLOOD PRESSURE: 126 MMHG | OXYGEN SATURATION: 97 % | HEIGHT: 64 IN | TEMPERATURE: 98 F | WEIGHT: 178 LBS

## 2024-12-11 DIAGNOSIS — G89.29 CHRONIC BILATERAL LOW BACK PAIN WITH BILATERAL SCIATICA: ICD-10-CM

## 2024-12-11 DIAGNOSIS — Z23 NEED FOR VACCINATION: ICD-10-CM

## 2024-12-11 DIAGNOSIS — E61.2 MAGNESIUM DEFICIENCY: ICD-10-CM

## 2024-12-11 DIAGNOSIS — E66.09 CLASS 1 OBESITY DUE TO EXCESS CALORIES WITH SERIOUS COMORBIDITY AND BODY MASS INDEX (BMI) OF 31.0 TO 31.9 IN ADULT: ICD-10-CM

## 2024-12-11 DIAGNOSIS — Z11.59 NEED FOR HEPATITIS C SCREENING TEST: ICD-10-CM

## 2024-12-11 DIAGNOSIS — M54.42 CHRONIC BILATERAL LOW BACK PAIN WITH BILATERAL SCIATICA: ICD-10-CM

## 2024-12-11 DIAGNOSIS — E53.8 B12 DEFICIENCY: ICD-10-CM

## 2024-12-11 DIAGNOSIS — J45.21 MILD INTERMITTENT ASTHMA WITH ACUTE EXACERBATION: ICD-10-CM

## 2024-12-11 DIAGNOSIS — E55.9 VITAMIN D DEFICIENCY: ICD-10-CM

## 2024-12-11 DIAGNOSIS — R49.1 LOSS OF VOICE: ICD-10-CM

## 2024-12-11 DIAGNOSIS — M54.41 CHRONIC BILATERAL LOW BACK PAIN WITH BILATERAL SCIATICA: ICD-10-CM

## 2024-12-11 DIAGNOSIS — E66.9 OBESITY (BMI 30-39.9): ICD-10-CM

## 2024-12-11 DIAGNOSIS — E53.8 VITAMIN B 12 DEFICIENCY: ICD-10-CM

## 2024-12-11 DIAGNOSIS — M62.830 MUSCLE SPASM OF BACK: ICD-10-CM

## 2024-12-11 DIAGNOSIS — I89.0 LYMPHEDEMA: ICD-10-CM

## 2024-12-11 DIAGNOSIS — E78.5 DYSLIPIDEMIA: ICD-10-CM

## 2024-12-11 DIAGNOSIS — E61.1 IRON DEFICIENCY: ICD-10-CM

## 2024-12-11 DIAGNOSIS — E66.811 CLASS 1 OBESITY DUE TO EXCESS CALORIES WITH SERIOUS COMORBIDITY AND BODY MASS INDEX (BMI) OF 31.0 TO 31.9 IN ADULT: ICD-10-CM

## 2024-12-11 DIAGNOSIS — Z98.84 S/P GASTRIC BYPASS: ICD-10-CM

## 2024-12-11 DIAGNOSIS — J40 BRONCHITIS: ICD-10-CM

## 2024-12-11 DIAGNOSIS — R73.01 IFG (IMPAIRED FASTING GLUCOSE): ICD-10-CM

## 2024-12-11 PROCEDURE — 90472 IMMUNIZATION ADMIN EACH ADD: CPT | Performed by: INTERNAL MEDICINE

## 2024-12-11 PROCEDURE — 90677 PCV20 VACCINE IM: CPT | Performed by: INTERNAL MEDICINE

## 2024-12-11 PROCEDURE — 3078F DIAST BP <80 MM HG: CPT | Performed by: INTERNAL MEDICINE

## 2024-12-11 PROCEDURE — 90656 IIV3 VACC NO PRSV 0.5 ML IM: CPT | Performed by: INTERNAL MEDICINE

## 2024-12-11 PROCEDURE — 3074F SYST BP LT 130 MM HG: CPT | Performed by: INTERNAL MEDICINE

## 2024-12-11 PROCEDURE — 99214 OFFICE O/P EST MOD 30 MIN: CPT | Mod: 25 | Performed by: INTERNAL MEDICINE

## 2024-12-11 PROCEDURE — 90471 IMMUNIZATION ADMIN: CPT | Performed by: INTERNAL MEDICINE

## 2024-12-11 RX ORDER — GABAPENTIN 800 MG/1
800 TABLET ORAL 3 TIMES DAILY
Qty: 270 TABLET | Refills: 0 | Status: SHIPPED | OUTPATIENT
Start: 2024-12-11

## 2024-12-11 RX ORDER — IPRATROPIUM BROMIDE AND ALBUTEROL SULFATE 2.5; .5 MG/3ML; MG/3ML
3 SOLUTION RESPIRATORY (INHALATION) EVERY 4 HOURS PRN
Qty: 90 EACH | Refills: 0 | Status: SHIPPED | OUTPATIENT
Start: 2024-12-11

## 2024-12-11 RX ORDER — POTASSIUM CHLORIDE 1500 MG/1
20 TABLET, EXTENDED RELEASE ORAL
Qty: 90 TABLET | Refills: 2 | Status: SHIPPED | OUTPATIENT
Start: 2024-12-11

## 2024-12-11 RX ORDER — VITAMIN B COMPLEX
1000 TABLET ORAL DAILY
Qty: 100 TABLET | Refills: 3 | Status: SHIPPED | OUTPATIENT
Start: 2024-12-11

## 2024-12-11 RX ORDER — PHENTERMINE HYDROCHLORIDE 37.5 MG/1
37.5 CAPSULE ORAL EVERY MORNING
Qty: 90 CAPSULE | Refills: 1 | Status: SHIPPED | OUTPATIENT
Start: 2024-12-11 | End: 2025-03-11

## 2024-12-11 RX ORDER — METHOCARBAMOL 750 MG/1
750 TABLET, FILM COATED ORAL 4 TIMES DAILY PRN
Qty: 120 TABLET | Refills: 5 | Status: SHIPPED | OUTPATIENT
Start: 2024-12-11

## 2024-12-11 RX ORDER — FLUTICASONE PROPIONATE 44 UG/1
2 AEROSOL, METERED RESPIRATORY (INHALATION) 2 TIMES DAILY PRN
Qty: 10.6 G | Refills: 11 | Status: SHIPPED | OUTPATIENT
Start: 2024-12-11

## 2024-12-11 RX ORDER — ALBUTEROL SULFATE 90 UG/1
1-2 INHALANT RESPIRATORY (INHALATION) EVERY 4 HOURS PRN
Qty: 8.5 G | Refills: 0 | Status: SHIPPED | OUTPATIENT
Start: 2024-12-11

## 2024-12-11 ASSESSMENT — FIBROSIS 4 INDEX: FIB4 SCORE: 1.2

## 2024-12-11 NOTE — LETTER
December 11, 2024    To Whom It May Concern:         This is confirmation that Karine Ovalle is currently under my care and at this time she needs her power to be on due to having and electric bed , oxygen concentrator , and along with her pump for her  lymphedema . It is medically neccessary  that she has her electricity on at all times  .         If you have any questions please do not hesitate to call me at the phone number listed below.    Sincerely,          Micky Rubin M.D  [phone]

## 2024-12-11 NOTE — LETTER
December 11, 2024    To Whom It May Concern:         This is confirmation that Karine Ovalle is currently under my care and at this time she can not loose her power due to medical issues that she has which is lymphedema and her oxygen  she has she has to use            If you have any questions please do not hesitate to call me at the phone number listed below.    Sincerely,          Micky Rubin M.D   [phone]

## 2024-12-12 NOTE — PROGRESS NOTES
History of Present Illness  The patient is a 59-year-old female who presents for evaluation of lymphedema, sinus infection, cough, sore throat, obesity, nausea, mild asthma, and chronic kidney disease.    She is seeking a letter of medical necessity for her lymphedema pump, which she uses daily. She reports that without the pump, she experiences swelling and is prone to infections, necessitating hospitalization.    She has been experiencing a persistent cough, sinus infection, and sore throat for the past month. The cough is productive, with yellow and clear phlegm. She also reports nasal congestion and frequent nose blowing, resulting in rawness inside her nose and underneath it. Despite completing two courses of azithromycin, there has been no improvement. She has been managing these symptoms with Mucinex and applying Vaseline to her nose. She has previously responded well to Keflex 500 mg, which was prescribed in a quantity of 40 tablets.    She is currently on phentermine 37.5 mg, prescribed by me, and is requesting a refill. She has lost 60 pounds since her gastric bypass surgery in 2002, performed by Dr. Nolan William in [place]. She is unable to exercise due to knee pain and difficulty walking. She is also taking over-the-counter gabapentin, B12, iron, and vitamin D supplements.    She is requesting a refill of her generic Compazine 10 mg prescription for nausea, which has been more frequent since her two GI procedures.    She uses her inhalers as needed, particularly when exposed to cigarette smoke, which causes shortness of breath. She does not have COPD but has very mild asthma. She has a tendency to develop bronchitis if not careful, hence the antibiotics always work for her. She has managed to avoid it for a long time and does not want to get bronchitis again. She has a nebulizer but does not have the medication for it. She can not use albuterol as it causes severe shaking. She is not currently on  Xopenex as it is not effective for her.    She is scheduled for a new set of MRIs on 12/31/2024 and will need to be sedated as she can not lie flat for an extended period. She is still recovering from a recent kidney stone removal.    Supplemental Information  She is under the care of pain management and is prescribed NSAIDs, morphine, and oxycodone. She has a history of falls, resulting in back pain and foot injuries. She has undergone bilateral hip surgeries in 2018 and right knee surgery. She is planning to have left knee surgery once her foot condition improves. She has a history of a minor stroke at age 33 but has not experienced any issues since then. She is not currently on statins due to potential interactions with Evenity. She is on Evenity for osteoporosis, prescribed by Dr. Cohen, and takes it monthly. She has previously tried Prolia without success. She experiences pain in all her long bones for two weeks following each Evenity injection. She is also taking Levoxyl, which she finds more effective than Synthroid.    MEDICATIONS  Current: phentermine, gabapentin, B12, iron, vitamin D, oxycodone, morphine, Evenity, Levoxyl  Past: azithromycin, Keflex, Prolia, Synthroid    IMMUNIZATIONS  She has received the COVID-19 vaccine.    Patient Active Problem List   Diagnosis    S/P gastric bypass    Vitamin B 12 deficiency    Chronic bilateral low back pain with bilateral sciatica- pain mgt;    spine nv    Venous insufficiency    Lymphedema    Uncomplicated opioid dependence (CMS-HCC)- spine nv    Essential hypertension    DDD (degenerative disc disease), lumbar    Class 1 obesity due to excess calories with serious comorbidity and body mass index (BMI) of 33.0 to 33.9 in adult- rx phentermine    DDD (degenerative disc disease), cervical    Cervical myelopathy (HCC)    Vitamin D deficiency    Primary insomnia    Primary osteoarthritis of both knees- sp right TKR 2015; left TKR tbd in future dr nowak     Administrative [id number] paperwork for workplace accommodation for chronic DDD and DJD hips and shoulders bilaterally status post multiple joint replacements    Chronic pain syndrome    Moderate persistent asthma without complication    Class 1 obesity due to excess calories with serious comorbidity and body mass index (BMI) of 31.0 to 31.9 in adult    Primary stress urinary incontinence    Mild persistent asthma without complication    Acute asthma exacerbation    Deformity of lower extremity, right- post surgical for mrsa cellulitis, debridement at [facility]    Degenerative arthritis of left knee     Outpatient Medications Prior to Visit   Medication Sig Dispense Refill    lidocaine (LIDODERM) 5 % Patch Place 2 Patches on the skin every 12 hours. Leave on for 12 hours and off for 12 hours 30 Patch 0    pyridoxine (VITAMIN B-6) 50 MG Tab Take 1 Tablet by mouth every day. 30 Tablet 11    potassium chloride SA (KDUR) 20 MEQ Tab CR Take 20 mEq by mouth.      Naloxone (NARCAN) 4 MG/0.1ML Liquid naloxone 4 mg/actuation nasal spray   CALL 911. SPR CONTENTS OF ONE SPRAYER (0.1ML) INTO ONE NOSTRIL. REPEAT IN 2-3 MIN IF SYMPTOMS OF OPIOID EMERGENCY PERSIST, ALTERNATE NOSTRILS      morphine ER (MS CONTIN) 15 MG Tab CR tablet morphine ER 15 mg tablet,extended release   Take 1 tablet every 12 hours by oral route as directed for 30 days.   Do not drive, drink etoh while taking.      morphine ER (MS CONTIN) 30 MG Tab CR tablet morphine ER 30 mg tablet,extended release   TAKE 1 TABLET BY MOUTH EVERY 12 HOURS FOR 3 DAYS      Biotin 5000 MCG Cap Take 1 Capsule by mouth every day. Indications: dietary supplement      zinc sulfate (ZINCATE) 220 (50 Zn) MG Cap Take 220 mg by mouth every day. Indications: Impaired Wound Healing, dietary supplement       Multiple Vitamin (MULTIVITAMIN ADULT) Tab Take 1 Tablet by mouth every day. Indications: Nutritional Support      ascorbic acid (VITAMIN C) 500 MG tablet Take 1 Tablet by  mouth 2 times a day. 100 Tablet 4    oxyCODONE-acetaminophen (PERCOCET-10)  MG Tab Take 1 Tablet by mouth every 6 hours as needed for Moderate Pain or Severe Pain. Indications: breakthrough pain betweeen morphine administration  0    gabapentin (NEURONTIN) 800 MG tablet TAKE 1 TABLET BY MOUTH THREE TIMES DAILY 270 Tablet 0    methocarbamol (ROBAXIN) 500 MG Tab TAKE 2 TABLETS BY MOUTH 4 TIMES DAILY AS NEEDED FOR  BACK  PAIN  AND  SPASMS 240 Tablet 0    albuterol 108 (90 Base) MCG/ACT Aero Soln inhalation aerosol Inhale 1-2 Puffs every four hours as needed for Shortness of Breath. 1 Each 0    benzonatate (TESSALON) 100 MG Cap Take 1 Capsule by mouth 3 times a day as needed for Cough. (Patient not taking: Reported on 9/15/2024) 20 Capsule 0    fluticasone (FLOVENT HFA) 44 MCG/ACT Aerosol Inhale 2 Puffs 2 times a day as needed (tobacco smoke). Everyday maintenance steroid inhaler  Indications: Asthma 1 Each 11    ipratropium-albuterol (DUONEB) 0.5-2.5 (3) MG/3ML nebulizer solution Inhale 3 mL by nebulization every four hours as needed for Shortness of Breath. 90 Each 0    furosemide (LASIX) 40 MG Tab Take 1 Tablet by mouth 1 time a day as needed (leg edema). Patient reports only takes if leg edema increases. She doesn't use daily as prescribed.   Indications: Edema (Patient not taking: Reported on 9/24/2024) 90 Tablet 4    potassium Chloride ER (K-TAB) 20 MEQ Tab CR tablet Take 20 mEq by mouth 1 time a day as needed (Per patient, she takes with furosemide. ). Indications: takes with furosemide      vitamin D3 (CHOLECALCIFEROL) 1000 Unit (25 mcg) Tab Take 1,000 Units by mouth every day. Indications: Vitamin D Deficiency, dietary supplement      CALCIUM CARBONATE-VITAMIN D PO Take 1 Tablet by mouth every day. Indications: Low Amount of Calcium in the Blood (Patient not taking: Reported on 9/24/2024)      ferrous sulfate 325 (65 Fe) MG tablet Take 1 Tab by mouth every morning with breakfast. (Patient not taking:  "Reported on 9/24/2024) 30 Tab 1     No facility-administered medications prior to visit.     .  No visits with results within 1 Month(s) from this visit.   Latest known visit with results is:   Office Visit on 02/25/2024   Component Date Value    SARS-CoV-2 by PCR 02/25/2024 Positive (A)     Influenza virus A RNA 02/25/2024 Negative     Influenza virus B, PCR 02/25/2024 Negative     RSV, PCR 02/25/2024 Negative       .alkll  Lab Results   Component Value Date/Time    HBA1C 4.9 09/22/2023 10:36 AM    HBA1C 4.7 06/02/2022 02:46 AM     Lab Results   Component Value Date/Time    SODIUM 140 02/16/2024 01:28 PM    POTASSIUM 4.2 02/16/2024 01:28 PM    CHLORIDE 106 02/16/2024 01:28 PM    CO2 21 02/16/2024 01:28 PM    GLUCOSE 85 02/16/2024 01:28 PM    BUN 13 02/16/2024 01:28 PM    CREATININE 0.50 02/16/2024 01:28 PM    CREATININE 0.88 08/14/2010 12:00 AM    BUNCREATRAT 13 08/14/2010 12:00 AM    GLOMRATE >59 08/14/2010 12:00 AM    ALKPHOSPHAT 136 (H) 02/16/2024 01:28 PM    ASTSGOT 26 02/16/2024 01:28 PM    ALTSGPT 17 02/16/2024 01:28 PM    TBILIRUBIN <0.2 02/16/2024 01:28 PM     Lab Results   Component Value Date/Time    INR 1.25 (H) 06/01/2022 03:30 PM    INR 1.00 02/13/2019 11:44 AM    INR 1.01 12/04/2018 11:15 AM     Lab Results   Component Value Date/Time    CHOLSTRLTOT 169 09/22/2023 10:36 AM    LDL 81 09/22/2023 10:36 AM    HDL 71 09/22/2023 10:36 AM    TRIGLYCERIDE 83 09/22/2023 10:36 AM       No results found for: \"TESTOSTERONE\"  Lab Results   Component Value Date/Time    TSH 2.110 08/14/2010 12:00 AM     Lab Results   Component Value Date/Time    FREET4 0.72 05/03/2016 07:32 AM     Lab Results   Component Value Date/Time    URICACID 5.6 08/12/2014 04:49 PM     No components found for: \"VITB12\"  Lab Results   Component Value Date/Time    25HYDROXY 32 09/22/2023 10:36 AM    25HYDROXY 44 06/02/2022 02:42 PM     1. Need for vaccination      - Pneumococcal Conjugate Vaccine 20-Valent (6 wks+)  - INFLUENZA VACCINE TRI INJ " (PF)     2. Need for hepatitis C screening test     - HEP C VIRUS ANTIBODY; Future    3. IFG (impaired fasting glucose)     - POCT Hemoglobin A1C  - HEMOGLOBIN A1C; Future    4. Chronic bilateral low back pain with bilateral sciatica- pain mgt;    spine nv     - phentermine 37.5 MG capsule; Take 1 Capsule by mouth every morning for 90 days.  Dispense: 90 Capsule; Refill: 1    5. Muscle spasm of back      - methocarbamol (ROBAXIN) 750 MG Tab; Take 1 Tablet by mouth 4 times a day as needed (muscle spasms).  Dispense: 120 Tablet; Refill: 5    6. Loss of voice     - albuterol 108 (90 Base) MCG/ACT Aero Soln inhalation aerosol; Inhale 1-2 Puffs by mouth every four hours as needed for Shortness of Breath.  Dispense: 8.5 g; Refill: 0    7. Bronchitis      - albuterol 108 (90 Base) MCG/ACT Aero Soln inhalation aerosol; Inhale 1-2 Puffs by mouth every four hours as needed for Shortness of Breath.  Dispense: 8.5 g; Refill: 0    8. Mild intermittent asthma with acute exacerbation     - ipratropium-albuterol (DUONEB) 0.5-2.5 (3) MG/3ML nebulizer solution; Inhale 3 mL by nebulization every four hours as needed for Shortness of Breath.  Dispense: 90 Each; Refill: 0    9. Obesity (BMI 30-39.9)  Mediterranean diet and exercise advised to lose 25 pounds    10. Class 1 obesity due to excess calories with serious comorbidity and body mass index (BMI) of 31.0 to 31.9 in adult  As above    11. Dyslipidemia  Check labs to rule out need for statin  - CBC WITH DIFFERENTIAL; Future  - Comp Metabolic Panel; Future  - TSH; Future  - Lipid Profile; Future    12. Vitamin B 12 deficiency  Continue with B12 replacement due to gastric bypass  - VITAMIN B12; Future  - FOLATE; Future    13. Iron deficiency  Continue with iron replacement therapy secondary to gastric bypass.  - IRON/TOTAL IRON BIND; Future    14. Vitamin D deficiency  2000 units daily.  - VITAMIN D,25 HYDROXY (DEFICIENCY); Future    15. Magnesium deficiency     - MAGNESIUM;  Future    16. B12 deficiency  Continue with 1000 g of vitamin B12 daily  - VITAMIN B1; Future    17. S/P gastric bypass     - VITAMIN B12; Future  - FOLATE; Future  - IRON/TOTAL IRON BIND; Future  - VITAMIN D,25 HYDROXY (DEFICIENCY); Future  - VITAMIN B6; Future  - MAGNESIUM; Future  - VITAMIN B1; Future    18. Lymphedema  Continue with lymph edema pump lactose-free power.  Assessment & Plan  1. Lymphedema.  A letter of medical necessity will be provided to document her dependency on the lymphedema pump.    2. Sinus Infection.  A prescription for Keflex 500 mg, quantity 40, with 3 refills, will be sent to her local pharmacy.    3. Obesity.  Her BMI is 31, qualifying her for Ozempic treatment. A prescription for phentermine 37.5 mg, quantity 90, with 1 refill, will be provided.    4. Nausea.  A prescription for generic Compazine 10 mg, quantity 90, will be provided.    5. Mild Asthma.  A prescription for Xopenex solution 1.25 mg per 0.5 cc, quantity 30, will be provided.    6. Chronic Kidney Disease Stage 3.  A creatinine test will be ordered to assess her kidney function.    7. Health Maintenance.  She will receive her influenza and pneumonia vaccines today. She is advised to get her COVID-19 booster and shingles vaccine at the pharmacy.    Follow-up  The patient will follow up in January 2025.    PROCEDURE  The patient underwent gastric bypass surgery in 2002. She has had bilateral hip surgeries and right knee surgery.

## 2024-12-16 DIAGNOSIS — M50.30 DDD (DEGENERATIVE DISC DISEASE), CERVICAL: ICD-10-CM

## 2024-12-16 DIAGNOSIS — M54.42 CHRONIC BILATERAL LOW BACK PAIN WITH BILATERAL SCIATICA: ICD-10-CM

## 2024-12-16 DIAGNOSIS — G89.29 CHRONIC BILATERAL LOW BACK PAIN WITH BILATERAL SCIATICA: ICD-10-CM

## 2024-12-16 DIAGNOSIS — M51.369 DDD (DEGENERATIVE DISC DISEASE), LUMBAR: ICD-10-CM

## 2024-12-16 DIAGNOSIS — M54.41 CHRONIC BILATERAL LOW BACK PAIN WITH BILATERAL SCIATICA: ICD-10-CM

## 2024-12-16 RX ORDER — METHOCARBAMOL 500 MG/1
TABLET, FILM COATED ORAL
Qty: 240 TABLET | Refills: 0 | Status: SHIPPED | OUTPATIENT
Start: 2024-12-16

## 2024-12-17 ENCOUNTER — APPOINTMENT (OUTPATIENT)
Dept: WOUND CARE | Facility: MEDICAL CENTER | Age: 59
End: 2024-12-17
Attending: INTERNAL MEDICINE
Payer: COMMERCIAL

## 2024-12-24 ENCOUNTER — APPOINTMENT (OUTPATIENT)
Dept: WOUND CARE | Facility: MEDICAL CENTER | Age: 59
End: 2024-12-24
Attending: INTERNAL MEDICINE
Payer: COMMERCIAL

## 2024-12-31 ENCOUNTER — APPOINTMENT (OUTPATIENT)
Dept: WOUND CARE | Facility: MEDICAL CENTER | Age: 59
End: 2024-12-31
Attending: INTERNAL MEDICINE
Payer: COMMERCIAL

## 2025-01-21 ENCOUNTER — PHARMACY VISIT (OUTPATIENT)
Dept: PHARMACY | Facility: MEDICAL CENTER | Age: 60
End: 2025-01-21
Payer: COMMERCIAL

## 2025-01-21 PROCEDURE — RXMED WILLOW AMBULATORY MEDICATION CHARGE: Performed by: INTERNAL MEDICINE

## 2025-01-31 DIAGNOSIS — M54.42 CHRONIC BILATERAL LOW BACK PAIN WITH BILATERAL SCIATICA: ICD-10-CM

## 2025-01-31 DIAGNOSIS — G89.29 CHRONIC BILATERAL LOW BACK PAIN WITH BILATERAL SCIATICA: ICD-10-CM

## 2025-01-31 DIAGNOSIS — M54.41 CHRONIC BILATERAL LOW BACK PAIN WITH BILATERAL SCIATICA: ICD-10-CM

## 2025-01-31 PROCEDURE — RXMED WILLOW AMBULATORY MEDICATION CHARGE: Performed by: INTERNAL MEDICINE

## 2025-02-02 ENCOUNTER — PHARMACY VISIT (OUTPATIENT)
Dept: PHARMACY | Facility: MEDICAL CENTER | Age: 60
End: 2025-02-02
Payer: COMMERCIAL

## 2025-02-02 PROCEDURE — RXOTC WILLOW AMBULATORY OTC CHARGE

## 2025-02-03 RX ORDER — PHENTERMINE HYDROCHLORIDE 37.5 MG/1
CAPSULE ORAL
Qty: 90 CAPSULE | Refills: 0 | Status: SHIPPED | OUTPATIENT
Start: 2025-02-03 | End: 2025-05-04

## 2025-02-05 ENCOUNTER — DOCUMENTATION (OUTPATIENT)
Dept: WOUND CARE | Facility: MEDICAL CENTER | Age: 60
End: 2025-02-05
Payer: COMMERCIAL

## 2025-02-12 PROCEDURE — RXMED WILLOW AMBULATORY MEDICATION CHARGE: Performed by: INTERNAL MEDICINE

## 2025-02-15 ENCOUNTER — PHARMACY VISIT (OUTPATIENT)
Dept: PHARMACY | Facility: MEDICAL CENTER | Age: 60
End: 2025-02-15
Payer: COMMERCIAL

## 2025-02-20 ENCOUNTER — DOCUMENTATION (OUTPATIENT)
Dept: WOUND CARE | Facility: MEDICAL CENTER | Age: 60
End: 2025-02-20
Payer: COMMERCIAL

## 2025-02-28 ENCOUNTER — DOCUMENTATION (OUTPATIENT)
Dept: WOUND CARE | Facility: MEDICAL CENTER | Age: 60
End: 2025-02-28
Payer: COMMERCIAL

## 2025-02-28 PROCEDURE — RXMED WILLOW AMBULATORY MEDICATION CHARGE: Performed by: NURSE PRACTITIONER

## 2025-03-01 ENCOUNTER — PHARMACY VISIT (OUTPATIENT)
Dept: PHARMACY | Facility: MEDICAL CENTER | Age: 60
End: 2025-03-01
Payer: COMMERCIAL

## 2025-03-01 PROCEDURE — RXOTC WILLOW AMBULATORY OTC CHARGE

## 2025-03-02 PROCEDURE — RXMED WILLOW AMBULATORY MEDICATION CHARGE: Performed by: NURSE PRACTITIONER

## 2025-03-03 ENCOUNTER — PHARMACY VISIT (OUTPATIENT)
Dept: PHARMACY | Facility: MEDICAL CENTER | Age: 60
End: 2025-03-03
Payer: COMMERCIAL

## 2025-03-06 ENCOUNTER — DOCUMENTATION (OUTPATIENT)
Dept: WOUND CARE | Facility: MEDICAL CENTER | Age: 60
End: 2025-03-06
Payer: COMMERCIAL

## 2025-03-10 PROCEDURE — RXMED WILLOW AMBULATORY MEDICATION CHARGE: Performed by: INTERNAL MEDICINE

## 2025-03-14 DIAGNOSIS — Z98.84 S/P GASTRIC BYPASS: ICD-10-CM

## 2025-03-14 DIAGNOSIS — E53.1 VITAMIN B6 DEFICIENCY: ICD-10-CM

## 2025-03-14 PROCEDURE — RXMED WILLOW AMBULATORY MEDICATION CHARGE: Performed by: INTERNAL MEDICINE

## 2025-03-14 RX ORDER — PYRIDOXINE HCL (VITAMIN B6) 50 MG
50 TABLET ORAL DAILY
Qty: 100 TABLET | Refills: 3 | Status: SHIPPED | OUTPATIENT
Start: 2025-03-14 | End: 2025-03-26

## 2025-03-14 RX ORDER — PYRIDOXINE HCL (VITAMIN B6) 50 MG
50 TABLET ORAL DAILY
Qty: 30 TABLET | Refills: 11 | Status: SHIPPED | OUTPATIENT
Start: 2025-03-14 | End: 2025-03-14 | Stop reason: SDUPTHER

## 2025-03-15 ENCOUNTER — APPOINTMENT (OUTPATIENT)
Dept: RADIOLOGY | Facility: MEDICAL CENTER | Age: 60
End: 2025-03-15
Attending: NURSE PRACTITIONER
Payer: COMMERCIAL

## 2025-03-15 ENCOUNTER — HOSPITAL ENCOUNTER (OUTPATIENT)
Facility: MEDICAL CENTER | Age: 60
End: 2025-03-15
Attending: INTERNAL MEDICINE
Payer: COMMERCIAL

## 2025-03-15 DIAGNOSIS — R73.01 IFG (IMPAIRED FASTING GLUCOSE): ICD-10-CM

## 2025-03-15 DIAGNOSIS — E78.5 DYSLIPIDEMIA: ICD-10-CM

## 2025-03-15 DIAGNOSIS — Z98.84 S/P GASTRIC BYPASS: ICD-10-CM

## 2025-03-15 DIAGNOSIS — E53.8 VITAMIN B 12 DEFICIENCY: ICD-10-CM

## 2025-03-15 DIAGNOSIS — E61.2 MAGNESIUM DEFICIENCY: ICD-10-CM

## 2025-03-15 DIAGNOSIS — Z11.59 NEED FOR HEPATITIS C SCREENING TEST: ICD-10-CM

## 2025-03-15 DIAGNOSIS — E53.8 B12 DEFICIENCY: ICD-10-CM

## 2025-03-15 DIAGNOSIS — M54.50 LOW BACK PAIN, UNSPECIFIED BACK PAIN LATERALITY, UNSPECIFIED CHRONICITY, UNSPECIFIED WHETHER SCIATICA PRESENT: ICD-10-CM

## 2025-03-15 DIAGNOSIS — E61.1 IRON DEFICIENCY: ICD-10-CM

## 2025-03-15 DIAGNOSIS — M54.2 CERVICALGIA: ICD-10-CM

## 2025-03-15 DIAGNOSIS — E55.9 VITAMIN D DEFICIENCY: ICD-10-CM

## 2025-03-15 LAB
25(OH)D3 SERPL-MCNC: 18 NG/ML (ref 30–100)
BASOPHILS # BLD AUTO: 0.8 % (ref 0–1.8)
BASOPHILS # BLD: 0.05 K/UL (ref 0–0.12)
EOSINOPHIL # BLD AUTO: 0.26 K/UL (ref 0–0.51)
EOSINOPHIL NFR BLD: 4.3 % (ref 0–6.9)
ERYTHROCYTE [DISTWIDTH] IN BLOOD BY AUTOMATED COUNT: 42.7 FL (ref 35.9–50)
FOLATE SERPL-MCNC: 8.8 NG/ML
HCT VFR BLD AUTO: 41.9 % (ref 37–47)
HCV AB SER QL: NORMAL
HGB BLD-MCNC: 14.1 G/DL (ref 12–16)
IMM GRANULOCYTES # BLD AUTO: 0.01 K/UL (ref 0–0.11)
IMM GRANULOCYTES NFR BLD AUTO: 0.2 % (ref 0–0.9)
IRON SATN MFR SERPL: 25 % (ref 15–55)
IRON SERPL-MCNC: 74 UG/DL (ref 40–170)
LYMPHOCYTES # BLD AUTO: 2.43 K/UL (ref 1–4.8)
LYMPHOCYTES NFR BLD: 39.8 % (ref 22–41)
MAGNESIUM SERPL-MCNC: 2.1 MG/DL (ref 1.5–2.5)
MCH RBC QN AUTO: 29.9 PG (ref 27–33)
MCHC RBC AUTO-ENTMCNC: 33.7 G/DL (ref 32.2–35.5)
MCV RBC AUTO: 88.8 FL (ref 81.4–97.8)
MONOCYTES # BLD AUTO: 0.42 K/UL (ref 0–0.85)
MONOCYTES NFR BLD AUTO: 6.9 % (ref 0–13.4)
NEUTROPHILS # BLD AUTO: 2.93 K/UL (ref 1.82–7.42)
NEUTROPHILS NFR BLD: 48 % (ref 44–72)
NRBC # BLD AUTO: 0 K/UL
NRBC BLD-RTO: 0 /100 WBC (ref 0–0.2)
PLATELET # BLD AUTO: 192 K/UL (ref 164–446)
PMV BLD AUTO: 10.2 FL (ref 9–12.9)
RBC # BLD AUTO: 4.72 M/UL (ref 4.2–5.4)
TIBC SERPL-MCNC: 293 UG/DL (ref 250–450)
TSH SERPL DL<=0.005 MIU/L-ACNC: 2.83 UIU/ML (ref 0.38–5.33)
UIBC SERPL-MCNC: 219 UG/DL (ref 110–370)
WBC # BLD AUTO: 6.1 K/UL (ref 4.8–10.8)

## 2025-03-15 PROCEDURE — 82306 VITAMIN D 25 HYDROXY: CPT

## 2025-03-15 PROCEDURE — 84207 ASSAY OF VITAMIN B-6: CPT

## 2025-03-15 PROCEDURE — 36415 COLL VENOUS BLD VENIPUNCTURE: CPT

## 2025-03-15 PROCEDURE — 84425 ASSAY OF VITAMIN B-1: CPT

## 2025-03-15 PROCEDURE — 86803 HEPATITIS C AB TEST: CPT

## 2025-03-15 PROCEDURE — 84443 ASSAY THYROID STIM HORMONE: CPT

## 2025-03-15 PROCEDURE — 72050 X-RAY EXAM NECK SPINE 4/5VWS: CPT

## 2025-03-15 PROCEDURE — 83036 HEMOGLOBIN GLYCOSYLATED A1C: CPT

## 2025-03-15 PROCEDURE — 82607 VITAMIN B-12: CPT

## 2025-03-15 PROCEDURE — 72110 X-RAY EXAM L-2 SPINE 4/>VWS: CPT

## 2025-03-15 PROCEDURE — 83540 ASSAY OF IRON: CPT

## 2025-03-15 PROCEDURE — 82746 ASSAY OF FOLIC ACID SERUM: CPT

## 2025-03-15 PROCEDURE — 83550 IRON BINDING TEST: CPT

## 2025-03-15 PROCEDURE — 85025 COMPLETE CBC W/AUTO DIFF WBC: CPT

## 2025-03-15 PROCEDURE — 83735 ASSAY OF MAGNESIUM: CPT

## 2025-03-16 LAB
EST. AVERAGE GLUCOSE BLD GHB EST-MCNC: 100 MG/DL
HBA1C MFR BLD: 5.1 % (ref 4–5.6)
VIT B12 SERPL-MCNC: 168 PG/ML (ref 211–911)

## 2025-03-18 ENCOUNTER — PHARMACY VISIT (OUTPATIENT)
Dept: PHARMACY | Facility: MEDICAL CENTER | Age: 60
End: 2025-03-18
Payer: COMMERCIAL

## 2025-03-19 LAB — VIT B1 BLD-MCNC: 102 NMOL/L (ref 70–180)

## 2025-03-22 LAB — VIT B6 SERPL-MCNC: 17.6 NMOL/L (ref 20–125)

## 2025-03-26 ENCOUNTER — OFFICE VISIT (OUTPATIENT)
Dept: MEDICAL GROUP | Age: 60
End: 2025-03-26
Payer: COMMERCIAL

## 2025-03-26 VITALS
OXYGEN SATURATION: 93 % | TEMPERATURE: 98 F | WEIGHT: 167 LBS | HEIGHT: 64 IN | BODY MASS INDEX: 28.51 KG/M2 | HEART RATE: 115 BPM | SYSTOLIC BLOOD PRESSURE: 130 MMHG | DIASTOLIC BLOOD PRESSURE: 70 MMHG

## 2025-03-26 DIAGNOSIS — Z98.84 S/P GASTRIC BYPASS: ICD-10-CM

## 2025-03-26 DIAGNOSIS — E61.2 MAGNESIUM DEFICIENCY: ICD-10-CM

## 2025-03-26 DIAGNOSIS — H61.111: ICD-10-CM

## 2025-03-26 DIAGNOSIS — N39.3 PRIMARY STRESS URINARY INCONTINENCE: ICD-10-CM

## 2025-03-26 DIAGNOSIS — H61.112 ACQUIRED DEFORMITY OF LEFT PIERCED EAR LOBE: ICD-10-CM

## 2025-03-26 DIAGNOSIS — E58 CALCIUM DEFICIENCY: ICD-10-CM

## 2025-03-26 DIAGNOSIS — Z02.9 ADMINISTRATIVE ENCOUNTER: ICD-10-CM

## 2025-03-26 DIAGNOSIS — Z01.84 IMMUNITY STATUS TESTING: ICD-10-CM

## 2025-03-26 DIAGNOSIS — E53.8 VITAMIN B 12 DEFICIENCY: ICD-10-CM

## 2025-03-26 DIAGNOSIS — E53.1 VITAMIN B6 DEFICIENCY DISEASE: ICD-10-CM

## 2025-03-26 DIAGNOSIS — J45.30 MILD PERSISTENT ASTHMA WITHOUT COMPLICATION: ICD-10-CM

## 2025-03-26 DIAGNOSIS — E66.9 OBESITY (BMI 30-39.9): ICD-10-CM

## 2025-03-26 DIAGNOSIS — Z00.00 HEALTHCARE MAINTENANCE: ICD-10-CM

## 2025-03-26 DIAGNOSIS — G89.29 CHRONIC BILATERAL LOW BACK PAIN WITH BILATERAL SCIATICA: ICD-10-CM

## 2025-03-26 DIAGNOSIS — M17.12 PRIMARY OSTEOARTHRITIS OF LEFT KNEE: ICD-10-CM

## 2025-03-26 DIAGNOSIS — Z01.84 IMMUNITY TO MEASLES, MUMPS, AND RUBELLA DETERMINED BY SEROLOGIC TEST: ICD-10-CM

## 2025-03-26 DIAGNOSIS — Z23 NEED FOR MMR VACCINE: ICD-10-CM

## 2025-03-26 DIAGNOSIS — M54.42 CHRONIC BILATERAL LOW BACK PAIN WITH BILATERAL SCIATICA: ICD-10-CM

## 2025-03-26 DIAGNOSIS — E60 ZINC DEFICIENCY: ICD-10-CM

## 2025-03-26 DIAGNOSIS — E55.9 VITAMIN D DEFICIENCY: ICD-10-CM

## 2025-03-26 DIAGNOSIS — Z12.11 SCREEN FOR COLON CANCER: ICD-10-CM

## 2025-03-26 DIAGNOSIS — F11.20 UNCOMPLICATED OPIOID DEPENDENCE (HCC): ICD-10-CM

## 2025-03-26 DIAGNOSIS — G95.9 CERVICAL MYELOPATHY (HCC): ICD-10-CM

## 2025-03-26 DIAGNOSIS — I87.2 VENOUS INSUFFICIENCY: ICD-10-CM

## 2025-03-26 DIAGNOSIS — E61.1 IRON DEFICIENCY: ICD-10-CM

## 2025-03-26 DIAGNOSIS — M54.41 CHRONIC BILATERAL LOW BACK PAIN WITH BILATERAL SCIATICA: ICD-10-CM

## 2025-03-26 PROBLEM — I89.0 LYMPHEDEMA: Status: RESOLVED | Noted: 2017-10-17 | Resolved: 2025-03-26

## 2025-03-26 PROBLEM — M17.0 PRIMARY OSTEOARTHRITIS OF BOTH KNEES: Status: RESOLVED | Noted: 2019-06-04 | Resolved: 2025-03-26

## 2025-03-26 PROBLEM — E66.811 CLASS 1 OBESITY DUE TO EXCESS CALORIES WITH SERIOUS COMORBIDITY AND BODY MASS INDEX (BMI) OF 31.0 TO 31.9 IN ADULT: Status: RESOLVED | Noted: 2023-01-19 | Resolved: 2025-03-26

## 2025-03-26 PROBLEM — E66.09 CLASS 1 OBESITY DUE TO EXCESS CALORIES WITH SERIOUS COMORBIDITY AND BODY MASS INDEX (BMI) OF 31.0 TO 31.9 IN ADULT: Status: RESOLVED | Noted: 2023-01-19 | Resolved: 2025-03-26

## 2025-03-26 PROBLEM — I10 ESSENTIAL HYPERTENSION: Status: RESOLVED | Noted: 2018-01-31 | Resolved: 2025-03-26

## 2025-03-26 PROBLEM — E66.811 CLASS 1 OBESITY DUE TO EXCESS CALORIES WITH SERIOUS COMORBIDITY AND BODY MASS INDEX (BMI) OF 33.0 TO 33.9 IN ADULT: Status: RESOLVED | Noted: 2018-06-07 | Resolved: 2025-03-26

## 2025-03-26 PROBLEM — E66.09 CLASS 1 OBESITY DUE TO EXCESS CALORIES WITH SERIOUS COMORBIDITY AND BODY MASS INDEX (BMI) OF 33.0 TO 33.9 IN ADULT: Status: RESOLVED | Noted: 2018-06-07 | Resolved: 2025-03-26

## 2025-03-26 PROBLEM — J45.901 ACUTE ASTHMA EXACERBATION: Status: RESOLVED | Noted: 2023-06-07 | Resolved: 2025-03-26

## 2025-03-26 PROBLEM — M21.951: Status: RESOLVED | Noted: 2023-07-13 | Resolved: 2025-03-26

## 2025-03-26 PROBLEM — J45.40 MODERATE PERSISTENT ASTHMA WITHOUT COMPLICATION: Status: RESOLVED | Noted: 2022-06-01 | Resolved: 2025-03-26

## 2025-03-26 PROCEDURE — RXMED WILLOW AMBULATORY MEDICATION CHARGE: Performed by: INTERNAL MEDICINE

## 2025-03-26 RX ORDER — VITAMIN B COMPLEX
1000 TABLET ORAL DAILY
Qty: 100 TABLET | Refills: 3 | Status: SHIPPED | OUTPATIENT
Start: 2025-03-26 | End: 2025-03-26

## 2025-03-26 RX ORDER — PHENTERMINE HYDROCHLORIDE 37.5 MG/1
37.5 TABLET ORAL
Qty: 90 TABLET | Refills: 1 | Status: SHIPPED | OUTPATIENT
Start: 2025-03-26 | End: 2025-06-24

## 2025-03-26 RX ORDER — IBUPROFEN 200 MG
1250 CAPSULE ORAL DAILY
Qty: 100 TABLET | Refills: 3 | Status: SHIPPED | OUTPATIENT
Start: 2025-03-26

## 2025-03-26 RX ORDER — ZINC SULFATE 50(220)MG
220 CAPSULE ORAL DAILY
Qty: 100 CAPSULE | Refills: 3 | Status: SHIPPED | OUTPATIENT
Start: 2025-03-26

## 2025-03-26 RX ORDER — ERGOCALCIFEROL 1.25 MG/1
50000 CAPSULE, LIQUID FILLED ORAL
Qty: 12 CAPSULE | Refills: 4 | Status: SHIPPED | OUTPATIENT
Start: 2025-03-26

## 2025-03-26 RX ORDER — FERROUS SULFATE 325(65) MG
325 TABLET ORAL
Qty: 100 TABLET | Refills: 3 | Status: SHIPPED | OUTPATIENT
Start: 2025-03-26

## 2025-03-26 RX ORDER — LANOLIN ALCOHOL/MO/W.PET/CERES
400 CREAM (GRAM) TOPICAL DAILY
Qty: 100 TABLET | Refills: 3 | Status: SHIPPED | OUTPATIENT
Start: 2025-03-26

## 2025-03-26 RX ORDER — ERGOCALCIFEROL 1.25 MG/1
50000 CAPSULE, LIQUID FILLED ORAL
Qty: 12 CAPSULE | Refills: 4 | Status: SHIPPED | OUTPATIENT
Start: 2025-03-26 | End: 2025-03-26 | Stop reason: SDUPTHER

## 2025-03-26 ASSESSMENT — ENCOUNTER SYMPTOMS
GASTROINTESTINAL NEGATIVE: 1
CONSTITUTIONAL NEGATIVE: 1
BACK PAIN: 1
CARDIOVASCULAR NEGATIVE: 1
RESPIRATORY NEGATIVE: 1
EYES NEGATIVE: 1
NECK PAIN: 1
FALLS: 1
NEUROLOGICAL NEGATIVE: 1
PSYCHIATRIC NEGATIVE: 1

## 2025-03-26 ASSESSMENT — FIBROSIS 4 INDEX: FIB4 SCORE: 1.94

## 2025-03-26 ASSESSMENT — PATIENT HEALTH QUESTIONNAIRE - PHQ9: CLINICAL INTERPRETATION OF PHQ2 SCORE: 0

## 2025-03-26 NOTE — ASSESSMENT & PLAN NOTE
Patient extremely low with a level of 18.  Started on 50,000 units weekly of vitamin D.  Orders:    vitamin D2, Ergocalciferol, (DRISDOL) 1.25 MG (59645 UT) Cap capsule; Take 1 Capsule by mouth every 7 days.

## 2025-03-26 NOTE — PROGRESS NOTES
Subjective     Karine Ovalle is a 59 y.o. female who presents with Paperwork (FMLA )    History of Present Illness  The patient is a 59-year-old black female who presents for follow-up of multiple problems related to her ongoing need for work accommodations due to partial disabilities associated with morbid obesity, degenerative joint disease, lymphedema, and recurrent leg infections requiring wound care. 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 Additionally, she requires follow-up on her ongoing health maintenance issues and vaccination status.    Her chief complaint is persistent pain in the left knee, which necessitates a total knee replacement before she can regain full functionality and mobility without the need to work from home. The degenerative joint disease of her knee is related to her weight. She has lost 200 pounds over the last 20 years following gastric bypass surgery, which has required the replacement of many vitamins, nutrients, and minerals. These need to be assessed periodically, and recent evaluations have revealed profound deficiencies in B12, vitamin D, and B6. She is currently on Percocet for this condition and is being managed entirely by them.    With regard to her persistent asthma, this has improved with her weight loss, and she no longer requires either a controlled or rescue inhaler.    She has stress urinary incontinence and may require a bladder suspension or urogynecological procedure. She does not experience pelvic pain or uterine bleeding but has not had a Pap smear or pelvic exam in several years. A referral to OB/GYN is needed for further assessment of this condition.    MEDICATIONS  Percocet    Patient Active Problem List   Diagnosis    S/P gastric bypass     Vitamin B 12 deficiency    Chronic bilateral low back pain with bilateral sciatica- pain mgt;    spine nv    Venous insufficiency    Uncomplicated opioid dependence (CMS-HCC)- spine nv    DDD (degenerative disc disease), lumbar    DDD (degenerative disc disease), cervical    Cervical myelopathy (HCC)    Vitamin D deficiency    Primary insomnia    Administrative [id number] [facility] paperwork for workplace accommodation for chronic DDD and DJD hips and shoulders bilaterally status post multiple joint replacements    Chronic pain syndrome    Primary stress urinary incontinence    Mild persistent asthma without complication    Degenerative arthritis of left knee     Outpatient Medications Prior to Visit   Medication Sig Dispense Refill    morphine ER (MS CONTIN) 15 MG Tab CR tablet Take 1 tablet every 12 hours by oral route as directed for 30 days, for chronic right knee pain. 60 Tablet 0    oxyCODONE-acetaminophen (PERCOCET-10)  MG Tab TAKE 1 TABLET BY MOUTH EVERY 6 HOURS. #120 for 30 days. 120 Tablet 0    phentermine 37.5 MG capsule TAKE 1 CAPSULE BY MOUTH IN THE MORNING. **NEEDS TO BE SEEN FOR ANY MORE REFILLS AFTER THIS ONE; MAKE 3 MONTH APPOINTMENT NOW** 90 Capsule 0    methocarbamol (ROBAXIN) 750 MG Tab Take 1 Tablet by mouth 4 times a day as needed (muscle spasms). 120 Tablet 5    albuterol 108 (90 Base) MCG/ACT Aero Soln inhalation aerosol Inhale 1-2 Puffs by mouth every four hours as needed for Shortness of Breath. 8.5 g 0    fluticasone (FLOVENT HFA) 44 MCG/ACT Aerosol Inhale 2 Puffs 2 times a day as needed (tobacco smoke). Everyday maintenance steroid inhaler  Indications: Asthma 10.6 g 11    ipratropium-albuterol (DUONEB) 0.5-2.5 (3) MG/3ML nebulizer solution Inhale 3 mL by nebulization every four hours as needed for Shortness of Breath. 90 Each 0    gabapentin (NEURONTIN) 800 MG tablet Take 1 Tablet by mouth 3 times a day. 270 Tablet 0    potassium Chloride ER (K-TAB) 20 MEQ Tab CR tablet Take  1 Tablet by mouth 1 time a day as needed (Per patient, she takes with furosemide. ). Indications: takes with furosemide 90 Tablet 2    lidocaine (LIDODERM) 5 % Patch Place 2 Patches on the skin every 12 hours. Leave on for 12 hours and off for 12 hours 30 Patch 0    potassium chloride SA (KDUR) 20 MEQ Tab CR Take 20 mEq by mouth.      Naloxone (NARCAN) 4 MG/0.1ML Liquid naloxone 4 mg/actuation nasal spray   CALL 911. SPR CONTENTS OF ONE SPRAYER (0.1ML) INTO ONE NOSTRIL. REPEAT IN 2-3 MIN IF SYMPTOMS OF OPIOID EMERGENCY PERSIST, ALTERNATE NOSTRILS      morphine ER (MS CONTIN) 15 MG Tab CR tablet morphine ER 15 mg tablet,extended release   Take 1 tablet every 12 hours by oral route as directed for 30 days.   Do not drive, drink etoh while taking.      morphine ER (MS CONTIN) 30 MG Tab CR tablet morphine ER 30 mg tablet,extended release   TAKE 1 TABLET BY MOUTH EVERY 12 HOURS FOR 3 DAYS      Biotin 5000 MCG Cap Take 1 Capsule by mouth every day. Indications: dietary supplement      Multiple Vitamin (MULTIVITAMIN ADULT) Tab Take 1 Tablet by mouth every day. Indications: Nutritional Support      ascorbic acid (VITAMIN C) 500 MG tablet Take 1 Tablet by mouth 2 times a day. 100 Tablet 4    oxyCODONE-acetaminophen (PERCOCET-10)  MG Tab Take 1 Tablet by mouth every 6 hours as needed for Moderate Pain or Severe Pain. Indications: breakthrough pain betweeen morphine administration  0    pyridoxine (VITAMIN B-6) 50 MG Tab Take 1 Tablet by mouth every day. 100 Tablet 3    methocarbamol (ROBAXIN) 500 MG Tab TAKE 2 TABLETS BY MOUTH 4 TIMES DAILY AS NEEDED FOR BACK PAIN AND SPASMS. MUST BE SEEN FOR FURTHER REFILLS 240 Tablet 0    vitamin D3 (CHOLECALCIFEROL) 1000 Unit (25 mcg) Tab Take 1 Tablet by mouth every day. Indications: Vitamin D Deficiency, dietary supplement 100 Tablet 3    zinc sulfate (ZINCATE) 220 (50 Zn) MG Cap Take 220 mg by mouth every day. Indications: Impaired Wound Healing, dietary supplement        No  facility-administered medications prior to visit.     Hospital Outpatient Visit on 03/15/2025   Component Date Value    TSH 03/15/2025 2.830     Glycohemoglobin 03/15/2025 5.1     Est Avg Glucose 03/15/2025 100     Vitamin B12 -True Cobala* 03/15/2025 168 (L)     Folate -Folic Acid 03/15/2025 8.8     Iron 03/15/2025 74     Total Iron Binding 03/15/2025 293     Unsat Iron Binding 03/15/2025 219     % Saturation 03/15/2025 25     25-Hydroxy   Vitamin D 25 03/15/2025 18 (L)     Vitamin B6 03/15/2025 17.6 (L)     Magnesium 03/15/2025 2.1     Vitamin B1 03/15/2025 102     WBC 03/15/2025 6.1     RBC 03/15/2025 4.72     Hemoglobin 03/15/2025 14.1     Hematocrit 03/15/2025 41.9     MCV 03/15/2025 88.8     MCH 03/15/2025 29.9     MCHC 03/15/2025 33.7     RDW 03/15/2025 42.7     Platelet Count 03/15/2025 192     MPV 03/15/2025 10.2     Neutrophils-Polys 03/15/2025 48.00     Lymphocytes 03/15/2025 39.80     Monocytes 03/15/2025 6.90     Eosinophils 03/15/2025 4.30     Basophils 03/15/2025 0.80     Immature Granulocytes 03/15/2025 0.20     Nucleated RBC 03/15/2025 0.00     Neutrophils (Absolute) 03/15/2025 2.93     Lymphs (Absolute) 03/15/2025 2.43     Monos (Absolute) 03/15/2025 0.42     Eos (Absolute) 03/15/2025 0.26     Baso (Absolute) 03/15/2025 0.05     Immature Granulocytes (a* 03/15/2025 0.01     NRBC (Absolute) 03/15/2025 0.00     Hepatitis C Antibody 03/15/2025 Non-Reactive       .owen    Lab Results   Component Value Date/Time    HBA1C 5.1 03/15/2025 02:15 PM    HBA1C 4.9 09/22/2023 10:36 AM     Lab Results   Component Value Date/Time    SODIUM 140 02/16/2024 01:28 PM    POTASSIUM 4.2 02/16/2024 01:28 PM    CHLORIDE 106 02/16/2024 01:28 PM    CO2 21 02/16/2024 01:28 PM    GLUCOSE 85 02/16/2024 01:28 PM    BUN 13 02/16/2024 01:28 PM    CREATININE 0.50 02/16/2024 01:28 PM    CREATININE 0.88 08/14/2010 12:00 AM    BUNCREATRAT 13 08/14/2010 12:00 AM    GLOMRATE >59 08/14/2010 12:00 AM    ALKPHOSPHAT 136 (H) 02/16/2024  "01:28 PM    ASTSGOT 26 02/16/2024 01:28 PM    ALTSGPT 17 02/16/2024 01:28 PM    TBILIRUBIN <0.2 02/16/2024 01:28 PM     Lab Results   Component Value Date/Time    INR 1.25 (H) 06/01/2022 03:30 PM    INR 1.00 02/13/2019 11:44 AM    INR 1.01 12/04/2018 11:15 AM     Lab Results   Component Value Date/Time    CHOLSTRLTOT 169 09/22/2023 10:36 AM    LDL 81 09/22/2023 10:36 AM    HDL 71 09/22/2023 10:36 AM    TRIGLYCERIDE 83 09/22/2023 10:36 AM       No results found for: \"TESTOSTERONE\"  Lab Results   Component Value Date/Time    TSH 2.110 08/14/2010 12:00 AM     Lab Results   Component Value Date/Time    FREET4 0.72 05/03/2016 07:32 AM     Lab Results   Component Value Date/Time    URICACID 5.6 08/12/2014 04:49 PM     No components found for: \"VITB12\"  Lab Results   Component Value Date/Time    25HYDROXY 18 (L) 03/15/2025 02:15 PM    25HYDROXY 32 09/22/2023 10:36 AM                   HPI    Review of Systems   Constitutional: Negative.    HENT: Negative.     Eyes: Negative.    Respiratory: Negative.     Cardiovascular: Negative.    Gastrointestinal: Negative.    Genitourinary: Negative.    Musculoskeletal:  Positive for back pain, falls, joint pain and neck pain.   Skin: Negative.    Neurological: Negative.    Endo/Heme/Allergies: Negative.    Psychiatric/Behavioral: Negative.                Objective     /70 (BP Location: Left arm, Patient Position: Sitting, BP Cuff Size: Adult)   Pulse (!) 115   Temp 36.7 °C (98 °F) (Temporal)   Ht 1.613 m (5' 3.5\")   Wt 75.8 kg (167 lb)   LMP  (LMP Unknown)   SpO2 93%   BMI 29.12 kg/m²      Physical Exam  Vitals and nursing note reviewed.   Constitutional:       General: She is not in acute distress.     Appearance: She is well-developed. She is not diaphoretic.   HENT:      Head: Normocephalic and atraumatic.      Comments: The earlobes of both ears show excoriation ulceration and near the vision vertically from embedded pierced ears.     Right Ear: External ear normal. "      Left Ear: External ear normal.      Nose: Nose normal.      Mouth/Throat:      Pharynx: No oropharyngeal exudate.   Eyes:      General: No scleral icterus.        Right eye: No discharge.         Left eye: No discharge.      Conjunctiva/sclera: Conjunctivae normal.      Pupils: Pupils are equal, round, and reactive to light.   Neck:      Thyroid: No thyromegaly.      Vascular: No JVD.      Trachea: No tracheal deviation.   Cardiovascular:      Rate and Rhythm: Normal rate and regular rhythm.      Heart sounds: Normal heart sounds. No murmur heard.     No friction rub. No gallop.   Pulmonary:      Effort: Pulmonary effort is normal. No respiratory distress.      Breath sounds: Normal breath sounds. No stridor. No wheezing or rales.   Chest:      Chest wall: No tenderness.   Abdominal:      General: Bowel sounds are normal. There is no distension.      Palpations: Abdomen is soft. There is no mass.      Tenderness: There is no abdominal tenderness. There is no guarding or rebound.      Hernia: No hernia is present.   Musculoskeletal:         General: Tenderness present. Normal range of motion.      Cervical back: Normal range of motion and neck supple.      Comments: There is diffuse tenderness and hypertrophic changes of the left knee.  There is marked lymphedema bilaterally of the lower extremities.      Lymphadenopathy:      Cervical: No cervical adenopathy.   Skin:     General: Skin is warm and dry.      Coloration: Skin is not pale.      Findings: Lesion present. No erythema or rash.      Comments: She has superficial lesions each measuring 1/2 to 2 cm of the right lower extremity that appear to be denuded and abraded.  Treated by wound care.   Neurological:      Mental Status: She is alert and oriented to person, place, and time.      Cranial Nerves: No cranial nerve deficit.      Motor: No abnormal muscle tone.      Coordination: Coordination normal.      Deep Tendon Reflexes: Reflexes are normal and  symmetric. Reflexes normal.   Psychiatric:         Behavior: Behavior normal.         Thought Content: Thought content normal.         Judgment: Judgment normal.                                  Assessment & Plan  Administrative [id number] [facility] paperwork for workplace accommodation for chronic DDD and DJD hips and shoulders bilaterally status post multiple joint replacements  Presently completed regarding patient's partial disability requiring replace accommodations and work from home and fax back to patient's employer.       Healthcare maintenance  Patient needs routine Pap smear gynecological health maintenance.  Has stress urinary continence and may need further surgical intervention for this.  Patient was wondering about hysterectomy but she has no postmenopausal bleeding or abdominal pain.  Previous CT of the abdomen showed no significant abnormalities but this was several years ago.  Will defer to OB/GYN for further assessment of the need for possible bladder suspension for stress and her incontinence or other gynecological urologic urological care.  Orders:    Referral to OB/Gyn Surgery    Primary stress urinary incontinence  See above.  Orders:    Referral to OB/Gyn Surgery    S/P gastric bypass  Continue vitamin mineral supplements as ordered below.  Check levels periodically at least every 6 to 12 months.  Continue on the following:  Orders:    VITAMIN B6; Future    vitamin D2, Ergocalciferol, (DRISDOL) 1.25 MG (49670 UT) Cap capsule; Take 1 Capsule by mouth every 7 days.    cyanocobalamin (VITAMIN B12) 1000 MCG Tab; Take 1 Tablet by mouth every day.    pyridoxine 100 MG tablet; Take 1 Tablet by mouth every day.    zinc sulfate (ZINCATE) 220 (50 Zn) MG Cap; Take 1 Capsule by mouth every day. Indications: Impaired Wound Healing, dietary supplement    ferrous sulfate 325 (65 Fe) MG EC tablet; Take 1 Tablet by mouth 3 times a day with meals.    magnesium oxide 400 (240 Mg) MG Tab; Take 1 Tablet by  mouth every day.    calcium carbonate (OS-ALLYSSA 500) 1250 (500 Ca) MG Tab; Take 3 Tablets by mouth every day.    Mild persistent asthma without complication  Under good control without inhalers at this time.  Peers to be quiescent.  If she develops recurrent wheezing she will let us know and we will start her back on her ICS/LABA.       Primary osteoarthritis of left knee  Eventually will need left total knee replacement.  She is status post bilateral hip replacements and right total knee replacement.  She will be able candidate at this time for left knee replacement once the skin lesions on her lymphogenic legs have resolved completely.       Vitamin D deficiency  Patient extremely low with a level of 18.  Started on 50,000 units weekly of vitamin D.  Orders:    vitamin D2, Ergocalciferol, (DRISDOL) 1.25 MG (63758 UT) Cap capsule; Take 1 Capsule by mouth every 7 days.    Vitamin B 12 deficiency  Patient's B12 deficient with a level of 168 and will be started on B12 1 mg orally a day.  If this is insufficient to get her levels normal she will need monthly B12 injections.  Orders:    cyanocobalamin (VITAMIN B12) 1000 MCG Tab; Take 1 Tablet by mouth every day.    Venous insufficiency  Continue with support hose as she is doing and wound care.       Uncomplicated opioid dependence (CMS-HCC)- spine nv  Continue maintenance with opiates with [facility] for chronic back pain.       Chronic bilateral low back pain with bilateral sciatica- pain mgt;    spine nv  Continue management [facility]       Cervical myelopathy (HCC)  Continue management at [facility].       Vitamin B6 deficiency disease  Needs replacement.  Start B6 100 mg daily.  Level is low at 17.6 (normal is ).  Orders:    VITAMIN B6; Future    pyridoxine 100 MG tablet; Take 1 Tablet by mouth every day.    Zinc deficiency  Continue zinc supplements daily  Orders:    zinc sulfate (ZINCATE) 220 (50 Zn) MG Cap; Take 1 Capsule by mouth every day.  Indications: Impaired Wound Healing, dietary supplement    Iron deficiency  Continue iron supplements daily.  Iron level adequate at 74 with percent sat of 25.  Orders:    ferrous sulfate 325 (65 Fe) MG EC tablet; Take 1 Tablet by mouth 3 times a day with meals.    Magnesium deficiency  Continue with supplements.  Orders:    magnesium oxide 400 (240 Mg) MG Tab; Take 1 Tablet by mouth every day.    Calcium deficiency  Continue with supplements.  Orders:    calcium carbonate (OS-ALLYSSA 500) 1250 (500 Ca) MG Tab; Take 3 Tablets by mouth every day.    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  If negative will need to vaccinations 4 weeks apart.  Orders:    Miscellaneous Test; Future    MEASLES/MUMPS/RUBELLA IMMUNITY; Future    Need for MMR vaccine    Orders:    Miscellaneous Test; Future    MEASLES/MUMPS/RUBELLA IMMUNITY; Future    Screen for colon cancer  Patient overdue for colon cancer screening.  Prefers colonoscopy over Cologuard.  She is at average risk except for her massive obesity which perhaps could be considered as a risk factor for colon cancer and therefore colonoscopy would be the preferred test.  Orders:    Referral to GI for Colonoscopy      Obesity-this problem appears to resolved with a BMI now of 29.  Patient congratulated.  She has now lost nearly 200 pounds since her maximum weight of 354 when she underwent her gastric bypass 20 years ago.  She will continue with phentermine and monitoring diet and exercise.  She is declined GLP-1 treatment and appears to be doing well with her current regimen.  Phentermine 37.5 mg daily will be reordered and continued henceforth.    Over 45  minutes face-to-face encounter took place today regarding management of patient's multiple conditions above.

## 2025-03-26 NOTE — ASSESSMENT & PLAN NOTE
Patient's B12 deficient with a level of 168 and will be started on B12 1 mg orally a day.  If this is insufficient to get her levels normal she will need monthly B12 injections.  Orders:    cyanocobalamin (VITAMIN B12) 1000 MCG Tab; Take 1 Tablet by mouth every day.

## 2025-03-26 NOTE — ASSESSMENT & PLAN NOTE
Eventually will need left total knee replacement.  She is status post bilateral hip replacements and right total knee replacement.  She will be able candidate at this time for left knee replacement once the skin lesions on her lymphogenic legs have resolved completely.

## 2025-03-26 NOTE — ASSESSMENT & PLAN NOTE
Continue vitamin mineral supplements as ordered below.  Check levels periodically at least every 6 to 12 months.  Continue on the following:  Orders:    VITAMIN B6; Future    vitamin D2, Ergocalciferol, (DRISDOL) 1.25 MG (84005 UT) Cap capsule; Take 1 Capsule by mouth every 7 days.    cyanocobalamin (VITAMIN B12) 1000 MCG Tab; Take 1 Tablet by mouth every day.    pyridoxine 100 MG tablet; Take 1 Tablet by mouth every day.    zinc sulfate (ZINCATE) 220 (50 Zn) MG Cap; Take 1 Capsule by mouth every day. Indications: Impaired Wound Healing, dietary supplement    ferrous sulfate 325 (65 Fe) MG EC tablet; Take 1 Tablet by mouth 3 times a day with meals.    magnesium oxide 400 (240 Mg) MG Tab; Take 1 Tablet by mouth every day.    calcium carbonate (OS-ALLYSSA 500) 1250 (500 Ca) MG Tab; Take 3 Tablets by mouth every day.

## 2025-03-26 NOTE — ASSESSMENT & PLAN NOTE
Under good control without inhalers at this time.  Peers to be quiescent.  If she develops recurrent wheezing she will let us know and we will start her back on her ICS/LABA.

## 2025-03-26 NOTE — ASSESSMENT & PLAN NOTE
Presently completed regarding patient's partial disability requiring replace accommodations and work from home and fax back to patient's employer.

## 2025-03-29 ENCOUNTER — PHARMACY VISIT (OUTPATIENT)
Dept: PHARMACY | Facility: MEDICAL CENTER | Age: 60
End: 2025-03-29
Payer: COMMERCIAL

## 2025-03-31 NOTE — Clinical Note
REFERRAL APPROVAL NOTICE         Sent on March 31, 2025                   Karine Ovalle  [address]  [address]  [place] [address]                   Dear Ms. Ovalle,    After a careful review of the medical information and benefit coverage, [facility] has processed your referral. See below for additional details.    If applicable, you must be actively enrolled with your insurance for coverage of the authorized service. If you have any questions regarding your coverage, please contact your insurance directly.    REFERRAL INFORMATION   Referral #:  [id number]  Referred-To Department    Referred-By Provider:  Gynecology    Micky Rubin M.D.   [facility]      [address]  [place] [address]  [phone] [address] [place] [address], [address]  [place] [id number] [phone]    Referral Start Date:  03/26/2025  Referral End Date:   03/26/2026             SCHEDULING  If you do not already have an appointment, please call [phone] to make an appointment.     MORE INFORMATION  If you do not already have a [employer] account, sign up at: [email or web address]  You can access your medical information, make appointments, see lab results, billing information, and more.  If you have questions regarding this referral, please contact  the [facility] Referrals department at:             [phone]. Monday - Friday 8:00AM - 5:00PM.     Sincerely,    [facility]

## 2025-03-31 NOTE — Clinical Note
REFERRAL APPROVAL NOTICE         Sent on March 31, 2025                   Karine Ovalle  [address]  [address]  [place] [address]                   Dear Ms. Ovalle,    After a careful review of the medical information and benefit coverage, [facility] has processed your referral. See below for additional details.    If applicable, you must be actively enrolled with your insurance for coverage of the authorized service. If you have any questions regarding your coverage, please contact your insurance directly.    REFERRAL INFORMATION   Referral #:  [id number]  Referred-To Provider    Referred-By Provider:  Otolaryngology    Micky Rubin M.D.   [facility]      [address]  [place] [address]  [phone] [address] [place] [address]  [phone]    Referral Start Date:  03/26/2025  Referral End Date:   03/26/2026             SCHEDULING  If you do not already have an appointment, please call [phone] to make an appointment.     MORE INFORMATION  If you do not already have a [employer] account, sign up at: [email or web address]  You can access your medical information, make appointments, see lab results, billing information, and more.  If you have questions regarding this referral, please contact  the [facility] Referrals department at:             [phone]. Monday - Friday 8:00AM - 5:00PM.     Sincerely,    [facility]

## 2025-03-31 NOTE — Clinical Note
REFERRAL APPROVAL NOTICE         Sent on March 31, 2025                   Karine Ovalle  [address]  [address]  [place] [address]                   Dear Ms. Ovalle,    After a careful review of the medical information and benefit coverage, [facility] has processed your referral. See below for additional details.    If applicable, you must be actively enrolled with your insurance for coverage of the authorized service. If you have any questions regarding your coverage, please contact your insurance directly.    REFERRAL INFORMATION   Referral #:  [id number]  Referred-To Provider    Referred-By Provider:  Gastroenterology    Micky Rubin M.D.   [facility]      [address]  [address]  [place] [address]  [phone] 0 [facility]  [place] [address]  [phone]    Referral Start Date:  03/26/2025  Referral End Date:   03/26/2026             SCHEDULING  If you do not already have an appointment, please call [phone] to make an appointment.     MORE INFORMATION  If you do not already have a [facility] account, sign up at: [employer] [email or web address]  You can access your medical information, make appointments, see lab results, billing information, and more.  If you have questions regarding this referral, please contact  the [facility] Referrals department at:             [phone]. Monday - Friday 8:00AM - 5:00PM.     Sincerely,    [facility]

## 2025-04-01 PROCEDURE — RXMED WILLOW AMBULATORY MEDICATION CHARGE: Performed by: NURSE PRACTITIONER

## 2025-04-02 ENCOUNTER — PHARMACY VISIT (OUTPATIENT)
Dept: PHARMACY | Facility: MEDICAL CENTER | Age: 60
End: 2025-04-02
Payer: COMMERCIAL

## 2025-04-02 DIAGNOSIS — E66.9 OBESITY (BMI 30-39.9): ICD-10-CM

## 2025-04-02 DIAGNOSIS — G89.29 CHRONIC BILATERAL LOW BACK PAIN WITH BILATERAL SCIATICA: ICD-10-CM

## 2025-04-02 DIAGNOSIS — M54.42 CHRONIC BILATERAL LOW BACK PAIN WITH BILATERAL SCIATICA: ICD-10-CM

## 2025-04-02 DIAGNOSIS — M54.41 CHRONIC BILATERAL LOW BACK PAIN WITH BILATERAL SCIATICA: ICD-10-CM

## 2025-04-02 PROCEDURE — RXOTC WILLOW AMBULATORY OTC CHARGE: Performed by: PHARMACIST

## 2025-04-02 RX ORDER — PHENTERMINE HYDROCHLORIDE 37.5 MG/1
37.5 CAPSULE ORAL EVERY MORNING
Qty: 30 CAPSULE | Refills: 5 | Status: SHIPPED | OUTPATIENT
Start: 2025-04-02 | End: 2025-05-02

## 2025-04-04 ENCOUNTER — TELEPHONE (OUTPATIENT)
Dept: MEDICAL GROUP | Age: 60
End: 2025-04-04
Payer: COMMERCIAL

## 2025-04-04 NOTE — TELEPHONE ENCOUNTER
Phone Number Called: [phone]    Call outcome: Spoke to patient regarding message below.    Message: patient scheduled an appointment with Monse for a injury on her finger. I called to inform that Monse would rather have her be seen at the [facility] because it would assist to expedite the process for her care. Patient was informed of closest location which was [address] [facility]. I informed her that it was next to [facility] and she said she knew where. Asked for a Drs note to be able to have work excused because she missed yesterday and today.I sent a note for today and informed that she needed to process in to be seen asap.

## 2025-04-08 ENCOUNTER — APPOINTMENT (OUTPATIENT)
Dept: MEDICAL GROUP | Facility: MEDICAL CENTER | Age: 60
End: 2025-04-08
Payer: COMMERCIAL

## 2025-04-09 ENCOUNTER — APPOINTMENT (OUTPATIENT)
Dept: MEDICAL GROUP | Age: 60
End: 2025-04-09
Payer: COMMERCIAL

## 2025-04-15 ENCOUNTER — APPOINTMENT (OUTPATIENT)
Dept: URGENT CARE | Facility: CLINIC | Age: 60
End: 2025-04-15
Payer: COMMERCIAL

## 2025-04-16 ENCOUNTER — OFFICE VISIT (OUTPATIENT)
Dept: URGENT CARE | Facility: CLINIC | Age: 60
End: 2025-04-16
Payer: COMMERCIAL

## 2025-04-16 VITALS
TEMPERATURE: 99.2 F | SYSTOLIC BLOOD PRESSURE: 130 MMHG | DIASTOLIC BLOOD PRESSURE: 72 MMHG | OXYGEN SATURATION: 94 % | HEIGHT: 63 IN | WEIGHT: 165 LBS | BODY MASS INDEX: 29.23 KG/M2 | RESPIRATION RATE: 15 BRPM | HEART RATE: 110 BPM

## 2025-04-16 DIAGNOSIS — T14.8XXA PUNCTURE WOUND: ICD-10-CM

## 2025-04-16 PROCEDURE — RXMED WILLOW AMBULATORY MEDICATION CHARGE

## 2025-04-16 PROCEDURE — 3078F DIAST BP <80 MM HG: CPT

## 2025-04-16 PROCEDURE — 3075F SYST BP GE 130 - 139MM HG: CPT

## 2025-04-16 PROCEDURE — 99213 OFFICE O/P EST LOW 20 MIN: CPT

## 2025-04-16 ASSESSMENT — FIBROSIS 4 INDEX: FIB4 SCORE: 1.94

## 2025-04-16 NOTE — LETTER
April 16, 2025    To Whom It May Concern:         This is confirmation that Karine Ovalle attended her scheduled appointment with ADITYA Del Rosario on 4/16/25. Please excuse her from work 4/14/25-4/16/25.          If you have any questions please do not hesitate to call me at the phone number listed below.    Sincerely,          Karine Chang A.P.R.N.  [phone]

## 2025-04-17 ASSESSMENT — ENCOUNTER SYMPTOMS
FEVER: 0
CHILLS: 1

## 2025-04-17 NOTE — PROGRESS NOTES
Verbal consent was acquired by the patient to use [employer] ambient listening note generation during this visit   Subjective:   Karine Ovalle is a 59 y.o. female who presents for Other (Wound on L leg as she fell today and 2 days ago/dizzy)      HPI:  History of Present Illness  The patient is a 59-year-old female who presents for an open wound on her left leg.     She sustained a puncture to her LLE when her small dog inadvertently punctured her left lower extremity with its claw 2 days ago. The wound has been exhibiting signs of drainage.. A history of lymphedema is noted. She denies fevers, but reports chills.        Review of Systems   Constitutional:  Positive for chills. Negative for fever.       Medications:    Current Outpatient Medications on File Prior to Visit   Medication Sig Dispense Refill    phentermine 37.5 MG capsule Take 1 Capsule by mouth every morning for 30 days. 30 Capsule 5    morphine ER (MS CONTIN) 15 MG Tab CR tablet Take 1 tablet every 12 hours by oral route as directed for 30 days, for chronic right knee pain. 60 Tablet 0    oxyCODONE-acetaminophen (PERCOCET-10)  MG Tab TAKE 1 TABLET BY MOUTH EVERY 6 HOURS. #120 for 30 days. 120 Tablet 0    vitamin D2, Ergocalciferol, (DRISDOL) 1.25 MG (71679 UT) Cap capsule Take 1 Capsule by mouth every 7 days. 12 Capsule 4    cyanocobalamin (VITAMIN B12) 1000 MCG Tab Take 1 Tablet by mouth every day. 100 Tablet 3    pyridoxine 100 MG tablet Take 1 Tablet by mouth every day. 100 Tablet 3    ferrous sulfate 325 (65 Fe) MG tablet Take 1 Tablet by mouth 3 times a day with meals. 100 Tablet 3    magnesium oxide 400 (240 Mg) MG Tab Take 1 Tablet by mouth every day. 100 Tablet 3    calcium carbonate (OS-ALLYSSA 500) 1250 (500 Ca) MG Tab Take 3 Tablets by mouth every day. 100 Tablet 3    phentermine 37.5 MG capsule TAKE 1 CAPSULE BY MOUTH IN THE MORNING. **NEEDS TO BE SEEN FOR ANY MORE REFILLS AFTER THIS ONE; MAKE 3 MONTH APPOINTMENT NOW** 90 Capsule  0    methocarbamol (ROBAXIN) 750 MG Tab Take 1 Tablet by mouth 4 times a day as needed (muscle spasms). 120 Tablet 5    albuterol 108 (90 Base) MCG/ACT Aero Soln inhalation aerosol Inhale 1-2 Puffs by mouth every four hours as needed for Shortness of Breath. 8.5 g 0    gabapentin (NEURONTIN) 800 MG tablet Take 1 Tablet by mouth 3 times a day. 270 Tablet 0    potassium Chloride ER (K-TAB) 20 MEQ Tab CR tablet Take 1 Tablet by mouth 1 time a day as needed (Per patient, she takes with furosemide. ). Indications: takes with furosemide 90 Tablet 2    lidocaine (LIDODERM) 5 % Patch Place 2 Patches on the skin every 12 hours. Leave on for 12 hours and off for 12 hours 30 Patch 0    potassium chloride SA (KDUR) 20 MEQ Tab CR Take 20 mEq by mouth.      Naloxone (NARCAN) 4 MG/0.1ML Liquid naloxone 4 mg/actuation nasal spray   CALL 911. SPR CONTENTS OF ONE SPRAYER (0.1ML) INTO ONE NOSTRIL. REPEAT IN 2-3 MIN IF SYMPTOMS OF OPIOID EMERGENCY PERSIST, ALTERNATE NOSTRILS      morphine ER (MS CONTIN) 15 MG Tab CR tablet morphine ER 15 mg tablet,extended release   Take 1 tablet every 12 hours by oral route as directed for 30 days.   Do not drive, drink etoh while taking.      morphine ER (MS CONTIN) 30 MG Tab CR tablet morphine ER 30 mg tablet,extended release   TAKE 1 TABLET BY MOUTH EVERY 12 HOURS FOR 3 DAYS      Biotin 5000 MCG Cap Take 1 Capsule by mouth every day. Indications: dietary supplement      Multiple Vitamin (MULTIVITAMIN ADULT) Tab Take 1 Tablet by mouth every day. Indications: Nutritional Support      ascorbic acid (VITAMIN C) 500 MG tablet Take 1 Tablet by mouth 2 times a day. 100 Tablet 4    oxyCODONE-acetaminophen (PERCOCET-10)  MG Tab Take 1 Tablet by mouth every 6 hours as needed for Moderate Pain or Severe Pain. Indications: breakthrough pain betweeen morphine administration  0    zinc sulfate (ZINCATE) 220 (50 Zn) MG Cap Take 1 Capsule by mouth every day. Indications: Impaired Wound Healing, dietary  supplement 100 Capsule 3    fluticasone (FLOVENT HFA) 44 MCG/ACT Aerosol Inhale 2 Puffs 2 times a day as needed (tobacco smoke). Everyday maintenance steroid inhaler  Indications: Asthma (Patient not taking: Reported on 4/16/2025) 10.6 g 11    ipratropium-albuterol (DUONEB) 0.5-2.5 (3) MG/3ML nebulizer solution Inhale 3 mL by nebulization every four hours as needed for Shortness of Breath. 90 Each 0     No current facility-administered medications on file prior to visit.        Allergies:   Tobacco [nicotiana tabacum] and Other environmental    Problem List:   Patient Active Problem List   Diagnosis    S/P gastric bypass    Vitamin B 12 deficiency    Chronic bilateral low back pain with bilateral sciatica- pain mgt;    spine nv    Venous insufficiency    Uncomplicated opioid dependence (CMS-HCC)- spine nv    DDD (degenerative disc disease), lumbar    DDD (degenerative disc disease), cervical    Cervical myelopathy (HCC)    Vitamin D deficiency    Primary insomnia    Administrative [id number] FMLA paperwork for workplace accommodation for chronic DDD and DJD hips and shoulders bilaterally status post multiple joint replacements    Chronic pain syndrome    Primary stress urinary incontinence    Mild persistent asthma without complication    Degenerative arthritis of left knee        Surgical History:  Past Surgical History:   Procedure Laterality Date    HIP ARTHROPLASTY TOTAL Left 02/13/2019    Procedure: HIP ARTHROPLASTY TOTAL, Cabling of intra-operative calcar fracture;  Surgeon: Bryon Levine M.D.;  [facility];  Service: Orthopedics    CERVICAL FUSION POSTERIOR  12/07/2018    Procedure: CERVICAL FUSION POSTERIOR- STAGE #2 C3-5 AND C3-T1;  Surgeon: Emre Mendoza III, M.D.;  Location: SURGERY [facility];  Service: Neurosurgery    CERVICAL LAMINECTOMY POSTERIOR  12/07/2018    Procedure: CERVICAL LAMINECTOMY POSTERIOR C2-T1;  Surgeon: Emre Mendoza III, M.D.;  Location: SURGERY  [facility];  Service: Neurosurgery    CERVICAL DISK AND FUSION ANTERIOR  12/05/2018    Procedure: CERVICAL DISK AND FUSION ANTERIOR-STAGE #1 C4-5;  Surgeon: Emre Mendoza III, M.D.;  Location: SURGERY [facility];  Service: Neurosurgery    CORPECTOMY  12/05/2018    Procedure: CORPECTOMY;  Surgeon: Emre Mendoza III, M.D.;  Location: SURGERY [facility];  Service: Neurosurgery    HIP ARTHROPLASTY TOTAL Right 03/20/2018    Procedure: HIP ARTHROPLASTY TOTAL;  Surgeon: Bryon Levine M.D.;  Location: SURGERY [facility];  Service: Orthopedics    KNEE ARTHROPLASTY TOTAL Right 10/20/2015    Procedure: KNEE ARTHROPLASTY TOTAL;  Surgeon: Bryon Levine M.D.;  Location: SURGERY [facility];  Service:     APPENDECTOMY LAPAROSCOPIC  03/21/2013    Performed by Dali Queen M.D. at [facility]    DEBRIDEMENT  05/17/2012    Performed by BRADLEY DYKES at SURGERY [facility]    PLASTIC SURGERY  2004    excess skin on arms and legs removed    MAMMOPLASTY AUGMENTATION Bilateral 2004    breast implants and \"tummy tuck\"    GASTRIC BYPASS LAPAROSCOPIC  2002    x 2    ABDOMINAL EXPLORATION      APPENDECTOMY      ARTHROSCOPY, KNEE      HIP REPLACEMENT, TOTAL      OTHER      breast augmentation 2004    OTHER      Gastric bypass 2002    PRIMARY C SECTION      SLEEVE,OSEI VASO THIGH      TONSILLECTOMY         Past Social Hx:   Social History     Tobacco Use    Smoking status: Never     Passive exposure: Past    Smokeless tobacco: Never    Tobacco comments:     Allergies  to cigarette  smoke, creates shortness breathe   Vaping Use    Vaping status: Never Used   Substance Use Topics    Alcohol use: Yes     Alcohol/week: 1.2 oz     Types: 2 Glasses of wine per week     Comment: 3-4 per week; pt stops alcohol use 1-week ago    Drug use: No     Frequency: 4.0 times per week          Problem list, medications, and allergies reviewed by myself today in Epic.     Objective:     /72   Pulse (!) " "110   Temp 37.3 °C (99.2 °F) (Temporal)   Resp 15   Ht 1.6 m (5' 3\")   Wt 74.8 kg (165 lb)   LMP  (LMP Unknown)   SpO2 94%   BMI 29.23 kg/m²     Physical Exam  Vitals and nursing note reviewed.   Constitutional:       General: She is not in acute distress.     Appearance: Normal appearance. She is normal weight. She is not ill-appearing, toxic-appearing or diaphoretic.   HENT:      Head: Normocephalic and atraumatic.   Cardiovascular:      Rate and Rhythm: Normal rate and regular rhythm.      Pulses: Normal pulses.      Heart sounds: Normal heart sounds. No murmur heard.     No friction rub. No gallop.   Pulmonary:      Effort: Pulmonary effort is normal. No respiratory distress.      Breath sounds: Normal breath sounds. No stridor. No wheezing, rhonchi or rales.   Chest:      Chest wall: No tenderness.   Musculoskeletal:      Cervical back: Neck supple. No tenderness.      Right lower le+ Edema present.      Left lower le+ Edema present.        Legs:       Comments: Puncture wound with drainage to LLE   Lymphadenopathy:      Cervical: No cervical adenopathy.   Skin:     General: Skin is warm and dry.      Capillary Refill: Capillary refill takes less than 2 seconds.   Neurological:      General: No focal deficit present.      Mental Status: She is alert and oriented to person, place, and time. Mental status is at baseline.      Cranial Nerves: No cranial nerve deficit.      Motor: No weakness.      Gait: Gait normal.   Psychiatric:         Mood and Affect: Mood normal.         Behavior: Behavior normal.         Thought Content: Thought content normal.         Judgment: Judgment normal.         Assessment/Plan:     Diagnosis and associated orders:   1. Puncture wound  - amoxicillin-clavulanate (AUGMENTIN) 875-125 MG Tab; Take 1 Tablet by mouth 2 times a day for 7 days.  Dispense: 14 Tablet; Refill: 0        Comments/MDM:   Pt is clinically stable at today's acute urgent care visit.  No acute distress " noted. Appropriate for outpatient management at this time.     Assessment & Plan    The wound has been draining, and a fever was reported. Given the history of lymphedema and the wound caused by a dog, there is an increased risk of infection. Physical examination confirmed the presence of an open wound of the LLE. No allergies to antibiotics were reported. The patient was advised to clean the wound with gentle soap and water, ensuring it is not hot, and to keep it covered if it continues to drain. Emphasized the importance of monitoring for signs of infection. Augmentin was prescribed to prevent infection. A work note will be provided for Monday through Wednesday. If the condition worsens or signs of infection appear, further evaluation is necessary.            Discussed DDx, management options (risks,benefits, and alternatives to planned treatment), natural progression and supportive care.  Expressed understanding and the treatment plan was agreed upon. Questions were encouraged and answered   Return to urgent care prn if new or worsening sx or if there is no improvement in condition prn.    Educated in Red flags and indications to immediately call 911 or present to the Emergency Department.   Advised the patient to follow-up with the primary care physician for recheck, reevaluation, and consideration of further management.    I personally reviewed prior external notes and test results pertinent to today's visit.  I have independently reviewed and interpreted all diagnostics ordered during this urgent care acute visit.       Please note that this dictation was created using voice recognition software. I have made a reasonable attempt to correct obvious errors, but I expect that there are errors of grammar and possibly content that I did not discover before finalizing the note.    This note was electronically signed by LEORA Paul

## 2025-04-19 PROCEDURE — RXMED WILLOW AMBULATORY MEDICATION CHARGE: Performed by: INTERNAL MEDICINE

## 2025-04-20 ENCOUNTER — PHARMACY VISIT (OUTPATIENT)
Dept: PHARMACY | Facility: MEDICAL CENTER | Age: 60
End: 2025-04-20
Payer: COMMERCIAL

## 2025-04-30 ENCOUNTER — PHARMACY VISIT (OUTPATIENT)
Dept: PHARMACY | Facility: MEDICAL CENTER | Age: 60
End: 2025-04-30
Payer: COMMERCIAL

## 2025-04-30 PROCEDURE — RXMED WILLOW AMBULATORY MEDICATION CHARGE: Performed by: NURSE PRACTITIONER

## 2025-05-02 ENCOUNTER — PHARMACY VISIT (OUTPATIENT)
Dept: PHARMACY | Facility: MEDICAL CENTER | Age: 60
End: 2025-05-02

## 2025-05-02 PROCEDURE — RXOTC WILLOW AMBULATORY OTC CHARGE

## 2025-05-05 ENCOUNTER — OFFICE VISIT (OUTPATIENT)
Dept: URGENT CARE | Facility: CLINIC | Age: 60
End: 2025-05-05
Payer: COMMERCIAL

## 2025-05-05 ENCOUNTER — APPOINTMENT (OUTPATIENT)
Dept: RADIOLOGY | Facility: MEDICAL CENTER | Age: 60
End: 2025-05-05
Attending: NURSE PRACTITIONER
Payer: COMMERCIAL

## 2025-05-05 VITALS
RESPIRATION RATE: 18 BRPM | HEIGHT: 63 IN | SYSTOLIC BLOOD PRESSURE: 120 MMHG | OXYGEN SATURATION: 94 % | DIASTOLIC BLOOD PRESSURE: 72 MMHG | TEMPERATURE: 97.3 F | WEIGHT: 165 LBS | BODY MASS INDEX: 29.23 KG/M2 | HEART RATE: 85 BPM

## 2025-05-05 DIAGNOSIS — S73.101A HIP SPRAIN, RIGHT, INITIAL ENCOUNTER: ICD-10-CM

## 2025-05-05 DIAGNOSIS — G89.4 CHRONIC PAIN SYNDROME: ICD-10-CM

## 2025-05-05 DIAGNOSIS — M17.12 OSTEOARTHRITIS OF LEFT KNEE, UNSPECIFIED OSTEOARTHRITIS TYPE: ICD-10-CM

## 2025-05-05 PROCEDURE — 99214 OFFICE O/P EST MOD 30 MIN: CPT

## 2025-05-05 PROCEDURE — 3074F SYST BP LT 130 MM HG: CPT

## 2025-05-05 PROCEDURE — 3078F DIAST BP <80 MM HG: CPT

## 2025-05-05 RX ORDER — LIDOCAINE 50 MG/G
1 PATCH TOPICAL EVERY 24 HOURS
Qty: 20 PATCH | Refills: 0 | Status: SHIPPED | OUTPATIENT
Start: 2025-05-05

## 2025-05-05 RX ORDER — KETOROLAC TROMETHAMINE 15 MG/ML
15 INJECTION, SOLUTION INTRAMUSCULAR; INTRAVENOUS ONCE
Status: COMPLETED | OUTPATIENT
Start: 2025-05-05 | End: 2025-05-05

## 2025-05-05 RX ADMIN — KETOROLAC TROMETHAMINE 15 MG: 15 INJECTION, SOLUTION INTRAMUSCULAR; INTRAVENOUS at 22:06

## 2025-05-05 ASSESSMENT — ENCOUNTER SYMPTOMS
LOSS OF CONSCIOUSNESS: 0
SEIZURES: 0
DIZZINESS: 0
FALLS: 1
TREMORS: 0
HEADACHES: 0
SENSORY CHANGE: 0
MYALGIAS: 0
WEIGHT LOSS: 0
CONSTIPATION: 0
BACK PAIN: 0
FOCAL WEAKNESS: 0
WEAKNESS: 0
SPEECH CHANGE: 0
DIARRHEA: 0
TINGLING: 0
NECK PAIN: 0

## 2025-05-05 ASSESSMENT — FIBROSIS 4 INDEX: FIB4 SCORE: 1.94

## 2025-05-05 NOTE — LETTER
May 5, 2025    To Whom It May Concern:         This is confirmation that Karine Fox Vasquez attended her scheduled appointment with ADITYA Self on 5/05/25.         If you have any questions please do not hesitate to call me at the phone number listed below.    Sincerely,          ANNA Self.  [phone]

## 2025-05-05 NOTE — LETTER
May 5, 2025    To Whom It May Concern:         This is confirmation that Karine Fox Vasquez attended her scheduled appointment with ADITYA Self on 5/05/25.         If you have any questions please do not hesitate to call me at the phone number listed below.    Sincerely,          ANNA Self.  [phone]

## 2025-05-06 ENCOUNTER — APPOINTMENT (OUTPATIENT)
Dept: URGENT CARE | Facility: CLINIC | Age: 60
End: 2025-05-06
Payer: COMMERCIAL

## 2025-05-06 ENCOUNTER — TELEPHONE (OUTPATIENT)
Dept: URGENT CARE | Facility: CLINIC | Age: 60
End: 2025-05-06
Payer: COMMERCIAL

## 2025-05-06 NOTE — TELEPHONE ENCOUNTER
PT would like the note changed to read that she is excused from work 05/05/25 and able to return to work on 05/0625

## 2025-05-06 NOTE — PROGRESS NOTES
"  Subjective:   CHIEF COMPLAINT  Chief Complaint   Patient presents with    Hip Injury     Right hip pain due to a fall         Karine Ovalle is a very pleasant 59 y.o. female who presents for Hip Injury (Right hip pain due to a fall)      Patient presents with complaints of right hip pain after a fall at her home 2 days ago.  Patient states that she was getting out of her chair and that her right knee \"gave out\" and she subsequently knelt down onto her right knee and then fell over.  She states that she was able to get up and did not have any immediate pain though over the next 2 days has started to have more increased pain.  She denies any numbness she states that this likely happened because \"she has a bad left knee that needs to be repaired   And is always giving out.\"  She denies any numbness weakness tingling.  States she is able to bear weight though she does have some pain.  Denies any crepitus or popping.  She denies any head strikes, loss of conscious, no back pain or neck pain.  States that she reached out to her orthopedic surgeon and that they state did she \"likely needs to get her knee replaced.\"  She rates her pain at a 1-2 out of 10    Hip Injury  Pertinent negatives include no headaches, myalgias, neck pain or weakness.   She has got is not dislocated it looks fine    Review of Systems   Constitutional:  Negative for weight loss.   Gastrointestinal:  Negative for constipation and diarrhea.   Genitourinary:  Negative for dysuria, frequency and urgency.   Musculoskeletal:  Positive for falls and joint pain. Negative for back pain, myalgias and neck pain.   Neurological:  Negative for dizziness, tingling, tremors, sensory change, speech change, focal weakness, seizures, loss of consciousness, weakness and headaches.   All other systems reviewed and are negative.    Refer to HPI for additional details.    During this visit, appropriate PPE was worn, and hand hygiene was performed.    PMH:  has a " past medical history of Administrative encounter- RTC ACCESS/ADA PARATRANSIT ELIGIBILITY (06/04/2019), Anemia, Anemia, chronic disease (06/02/2022), Arthritis, At risk for falls, Back pain, Bronchitis, Cellulitis of right lower extremity (12/31/2021), Chickenpox, Chronic back pain, Cough, Deformity of lower extremity, right- post surgical for mrsa cellulitis, debridement at [facility] (07/13/2023), Dental disorder, Edema (12/13/2018), Frequent headaches, History of gastric bypass (04/25/2012), Hoarseness, persistent, Hypokalemia (06/07/2018), Hyponatremia (05/18/2012), Left hip pain (09/18/2018), Leg edema (08/09/2021), Leukocytosis (04/03/2022), Microcytic anemia- postop THR 2/2019 (12/13/2018), Mild intermittent asthma with acute exacerbation (04/25/2012), Morning headache, Multiple closed fractures of facial bone (HCC) (06/01/2022), Near syncope (06/01/2022), Obesity, Open wound of right lower extremity- needs wound vac (04/04/2022), Pain (12/04/2018), Pain (02/04/2019), Primary osteoarthritis of both hips- dr nowak; left THR done feb 6, 2019; right THR 3/2018 (06/07/2018), Primary osteoarthritis of both knees- sp right TKR 2015; left TKR tbd in future dr nowak (06/04/2019), Rhinitis, S/P hip replacement, bilateral (02/13/2019), Sepsis ([facility]) (04/03/2022), Serum gamma globulin increased (06/08/2022), Shortness of breath, Swelling of lower extremity, Tonsillitis, Toothache, Urinary incontinence, and Wheezing.    She has no past medical history of Breast cancer ([facility]) or Diabetes.    MEDS:   Current Outpatient Medications:     lidocaine (LIDODERM) 5 % Patch, Place 1 Patch on the skin every 24 hours. Apply to affected area, 24 hours on followed by 24 hours off., Disp: 20 Patch, Rfl: 0    [START ON 6/29/2025] morphine ER (MS CONTIN) 15 MG Tab CR tablet, Take 1 tablet every 12 hours by oral route as directed for 30 days, for chronic right knee pain. Do not fill before 6/29/25., Disp: 60 Tablet, Rfl: 0    [START ON  5/30/2025] oxyCODONE-acetaminophen (PERCOCET-10)  MG Tab, TAKE 1 TABLET BY MOUTH EVERY 6 HOURS. #120 for 30 days. Do not fill before 5/30/25., Disp: 120 Tablet, Rfl: 0    oxyCODONE-acetaminophen (PERCOCET-10)  MG Tab, TAKE 1 TABLET BY MOUTH EVERY 6 HOURS. #120 for 30 days., Disp: 120 Tablet, Rfl: 0    vitamin D2, Ergocalciferol, (DRISDOL) 1.25 MG (09709 UT) Cap capsule, Take 1 Capsule by mouth every 7 days., Disp: 12 Capsule, Rfl: 4    cyanocobalamin (VITAMIN B12) 1000 MCG Tab, Take 1 Tablet by mouth every day., Disp: 100 Tablet, Rfl: 3    pyridoxine 100 MG tablet, Take 1 Tablet by mouth every day., Disp: 100 Tablet, Rfl: 3    zinc sulfate (ZINCATE) 220 (50 Zn) MG Cap, Take 1 Capsule by mouth every day. Indications: Impaired Wound Healing, dietary supplement, Disp: 100 Capsule, Rfl: 3    ferrous sulfate 325 (65 Fe) MG tablet, Take 1 Tablet by mouth 3 times a day with meals., Disp: 100 Tablet, Rfl: 3    magnesium oxide 400 (240 Mg) MG Tab, Take 1 Tablet by mouth every day., Disp: 100 Tablet, Rfl: 3    calcium carbonate (OS-ALLYSSA 500) 1250 (500 Ca) MG Tab, Take 3 Tablets by mouth every day., Disp: 100 Tablet, Rfl: 3    methocarbamol (ROBAXIN) 750 MG Tab, Take 1 Tablet by mouth 4 times a day as needed (muscle spasms)., Disp: 120 Tablet, Rfl: 5    albuterol 108 (90 Base) MCG/ACT Aero Soln inhalation aerosol, Inhale 1-2 Puffs by mouth every four hours as needed for Shortness of Breath., Disp: 8.5 g, Rfl: 0    ipratropium-albuterol (DUONEB) 0.5-2.5 (3) MG/3ML nebulizer solution, Inhale 3 mL by nebulization every four hours as needed for Shortness of Breath., Disp: 90 Each, Rfl: 0    gabapentin (NEURONTIN) 800 MG tablet, Take 1 Tablet by mouth 3 times a day., Disp: 270 Tablet, Rfl: 0    potassium Chloride ER (K-TAB) 20 MEQ Tab CR tablet, Take 1 Tablet by mouth 1 time a day as needed (Per patient, she takes with furosemide. ). Indications: takes with furosemide, Disp: 90 Tablet, Rfl: 2    potassium chloride SA  (KDUR) 20 MEQ Tab CR, Take 20 mEq by mouth., Disp: , Rfl:     Naloxone (NARCAN) 4 MG/0.1ML Liquid, naloxone 4 mg/actuation nasal spray  CALL 911. SPR CONTENTS OF ONE SPRAYER (0.1ML) INTO ONE NOSTRIL. REPEAT IN 2-3 MIN IF SYMPTOMS OF OPIOID EMERGENCY PERSIST, ALTERNATE NOSTRILS, Disp: , Rfl:     Biotin 5000 MCG Cap, Take 1 Capsule by mouth every day. Indications: dietary supplement, Disp: , Rfl:     Multiple Vitamin (MULTIVITAMIN ADULT) Tab, Take 1 Tablet by mouth every day. Indications: Nutritional Support, Disp: , Rfl:     ascorbic acid (VITAMIN C) 500 MG tablet, Take 1 Tablet by mouth 2 times a day., Disp: 100 Tablet, Rfl: 4    [START ON 5/30/2025] morphine ER (MS CONTIN) 15 MG Tab CR tablet, Take 1 tablet every 12 hours by oral route as directed for 30 days, for chronic right knee pain. Do not fill before 5/30/25., Disp: 60 Tablet, Rfl: 0    morphine ER (MS CONTIN) 15 MG Tab CR tablet, Take 1 tablet every 12 hours by oral route as directed for 30 days, for chronic right knee pain. Do not fill before 4/30/25., Disp: 60 Tablet, Rfl: 0    oxyCODONE-acetaminophen (PERCOCET-10)  MG Tab, TAKE 1 TABLET BY MOUTH EVERY 6 HOURS. #120 for 30 days. Do not fill before 4/30/25., Disp: 120 Tablet, Rfl: 0    [START ON 6/29/2025] oxyCODONE-acetaminophen (PERCOCET-10)  MG Tab, TAKE 1 TABLET BY MOUTH EVERY 6 HOURS. #120 for 30 days. Do not fill before 6/29/25., Disp: 120 Tablet, Rfl: 0    morphine ER (MS CONTIN) 15 MG Tab CR tablet, Take 1 tablet every 12 hours by oral route as directed for 30 days, for chronic right knee pain., Disp: 60 Tablet, Rfl: 0    fluticasone (FLOVENT HFA) 44 MCG/ACT Aerosol, Inhale 2 Puffs 2 times a day as needed (tobacco smoke). Everyday maintenance steroid inhaler  Indications: Asthma (Patient not taking: Reported on 4/16/2025), Disp: 10.6 g, Rfl: 11    morphine ER (MS CONTIN) 15 MG Tab CR tablet, morphine ER 15 mg tablet,extended release  Take 1 tablet every 12 hours by oral route as  directed for 30 days.  Do not drive, drink etoh while taking., Disp: , Rfl:     morphine ER (MS CONTIN) 30 MG Tab CR tablet, morphine ER 30 mg tablet,extended release  TAKE 1 TABLET BY MOUTH EVERY 12 HOURS FOR 3 DAYS, Disp: , Rfl:     oxyCODONE-acetaminophen (PERCOCET-10)  MG Tab, Take 1 Tablet by mouth every 6 hours as needed for Moderate Pain or Severe Pain. Indications: breakthrough pain betweeen morphine administration, Disp: , Rfl: 0    ALLERGIES:   Allergies   Allergen Reactions    Tobacco [Nicotiana Tabacum] Shortness of Breath     Cigarette smoke causes SOB, rispatory issues    Other Environmental      Cigarette smoke  Other reaction(s): Not available     SURGHX:   Past Surgical History:   Procedure Laterality Date    HIP ARTHROPLASTY TOTAL Left 02/13/2019    Procedure: HIP ARTHROPLASTY TOTAL, Cabling of intra-operative calcar fracture;  Surgeon: Bryon Levine M.D.;  Location: [facility];  Service: Orthopedics    CERVICAL FUSION POSTERIOR  12/07/2018    Procedure: CERVICAL FUSION POSTERIOR- STAGE #2 C3-5 AND C3-T1;  Surgeon: Emre Mendoza III, M.D.;  Location: [facility];  Service: Neurosurgery    CERVICAL LAMINECTOMY POSTERIOR  12/07/2018    Procedure: CERVICAL LAMINECTOMY POSTERIOR C2-T1;  Surgeon: Emre Mendoza III, M.D.;  Location: [facility];  Service: Neurosurgery    CERVICAL DISK AND FUSION ANTERIOR  12/05/2018    Procedure: CERVICAL DISK AND FUSION ANTERIOR-STAGE #1 C4-5;  Surgeon: Emre Mendoza III, M.D.;  Location: [facility];  Service: Neurosurgery    CORPECTOMY  12/05/2018    Procedure: CORPECTOMY;  Surgeon: Emre Mendoza III, M.D.;  Location: [facility];  Service: Neurosurgery    HIP ARTHROPLASTY TOTAL Right 03/20/2018    Procedure: HIP ARTHROPLASTY TOTAL;  Surgeon: Bryon Levine M.D.;  Location: [facility];  Service: Orthopedics    KNEE ARTHROPLASTY TOTAL Right 10/20/2015    Procedure: KNEE ARTHROPLASTY  "TOTAL;  Surgeon: Bryon Levine M.D.;  Location: SURGERY [facility];  Service:     APPENDECTOMY LAPAROSCOPIC  03/21/2013    Performed by Dali Queen M.D. at SURGERY [facility]    DEBRIDEMENT  05/17/2012    Performed by BRADLEY DYKES at SURGERY [facility]    PLASTIC SURGERY  2004    excess skin on arms and legs removed    MAMMOPLASTY AUGMENTATION Bilateral 2004    breast implants and \"tummy tuck\"    GASTRIC BYPASS LAPAROSCOPIC  2002    x 2    ABDOMINAL EXPLORATION      APPENDECTOMY      ARTHROSCOPY, KNEE      HIP REPLACEMENT, TOTAL      OTHER      breast augmentation 2004    OTHER      Gastric bypass 2002    PRIMARY C SECTION      SLEEVE,OSEI VASO THIGH      TONSILLECTOMY       SOCHX:  reports that she has never smoked. She has been exposed to tobacco smoke. She has never used smokeless tobacco. She reports current alcohol use of about 1.2 oz of alcohol per week. She reports that she does not use drugs.    FH: Per HPI as applicable/pertinent.    Medications, Allergies, and current problem list reviewed today in Epic.     Objective:     /72 (BP Location: Left arm, Patient Position: Sitting, BP Cuff Size: Adult)   Pulse 85   Temp 36.3 °C (97.3 °F) (Temporal)   Resp 18   Ht 1.6 m (5' 3\")   Wt 74.8 kg (165 lb)   SpO2 94%     Physical Exam  Vitals reviewed.   Constitutional:       General: She is not in acute distress.     Appearance: She is normal weight. She is not ill-appearing.   HENT:      Head: Normocephalic and atraumatic.   Cardiovascular:      Rate and Rhythm: Normal rate.      Pulses: Normal pulses.   Pulmonary:      Effort: Pulmonary effort is normal.   Musculoskeletal:      Cervical back: Normal and normal range of motion. No rigidity or tenderness. Normal range of motion.      Thoracic back: No tenderness or bony tenderness. Normal range of motion.      Lumbar back: No swelling, tenderness or bony tenderness. Normal range of motion. Negative right straight leg raise test " and negative left straight leg raise test.      Right hip: Tenderness present. No deformity, lacerations, bony tenderness or crepitus. Decreased range of motion. Normal strength.      Left hip: Normal.        Legs:       Comments: Tenderness in the above marked area.  No obvious dislocation, no crepitus or clicking.  Patient has full passive range of motion without any hesitancy or pain.  Patient is able to ambulate and bear weight on her own with her cane which is her baseline.  No ecchymosis or wound noted   Neurological:      Mental Status: She is alert.         Assessment/Plan:     Diagnosis and associated orders:     1. Hip sprain, right, initial encounter  - ketorolac (Toradol) 15 MG/ML injection 15 mg  - lidocaine (LIDODERM) 5 % Patch; Place 1 Patch on the skin every 24 hours. Apply to affected area, 24 hours on followed by 24 hours off.  Dispense: 20 Patch; Refill: 0    2. Osteoarthritis of left knee, unspecified osteoarthritis type    3. Chronic pain syndrome     Comments/MDM:     Patient history and physical exam acute sprain of the right hip.  Patient presents well-appearing with no red flag symptoms or acute distress.  I discussed HPI, past medical history, and physical exam with patient.  Overall patient's physical exam is very reassuring, no obvious deformities, full range of motion with ambulation with cane which is her baseline.  Patient does have significant history of musculoskeletal injuries and surgeries.  I discussed with her potentially doing x-ray imaging, at this point patient refusing.  I suggested at this point close monitoring along with staggered Tylenol and ibuprofen, lidocaine patches, and watchful waiting.  Advised patient if she has any new or worsening symptoms, or if symptoms are not improving within 2 to 3 days that she needs to return to clinic or ED for further evaluation and likely imaging           Differential diagnosis, natural history, supportive care, and indications for  immediate follow-up discussed.    Advised the patient to follow-up with the primary care physician for recheck, reevaluation, and consideration of further management.    Please note that this dictation was created using voice recognition software. I have made a reasonable attempt to correct obvious errors, but I expect that there are errors of grammar and possibly content that I did not discover before finalizing the note.    This note was electronically signed by ADITYA Self

## 2025-05-16 PROCEDURE — RXMED WILLOW AMBULATORY MEDICATION CHARGE: Performed by: INTERNAL MEDICINE

## 2025-05-18 ENCOUNTER — PHARMACY VISIT (OUTPATIENT)
Dept: PHARMACY | Facility: MEDICAL CENTER | Age: 60
End: 2025-05-18
Payer: COMMERCIAL

## 2025-05-28 ENCOUNTER — OFFICE VISIT (OUTPATIENT)
Dept: URGENT CARE | Facility: CLINIC | Age: 60
End: 2025-05-28
Payer: COMMERCIAL

## 2025-05-28 ENCOUNTER — APPOINTMENT (OUTPATIENT)
Dept: RADIOLOGY | Facility: IMAGING CENTER | Age: 60
End: 2025-05-28
Attending: PHYSICIAN ASSISTANT
Payer: COMMERCIAL

## 2025-05-28 VITALS
OXYGEN SATURATION: 95 % | DIASTOLIC BLOOD PRESSURE: 68 MMHG | HEART RATE: 96 BPM | RESPIRATION RATE: 18 BRPM | BODY MASS INDEX: 27.46 KG/M2 | HEIGHT: 63 IN | TEMPERATURE: 98.7 F | WEIGHT: 155 LBS | SYSTOLIC BLOOD PRESSURE: 126 MMHG

## 2025-05-28 DIAGNOSIS — M79.644 PAIN OF FINGER OF RIGHT HAND: ICD-10-CM

## 2025-05-28 DIAGNOSIS — S66.519A STRAIN OF TENDON OF INTRINSIC MUSCLE OF FINGER: Primary | ICD-10-CM

## 2025-05-28 PROCEDURE — 3078F DIAST BP <80 MM HG: CPT | Performed by: PHYSICIAN ASSISTANT

## 2025-05-28 PROCEDURE — 99213 OFFICE O/P EST LOW 20 MIN: CPT | Performed by: PHYSICIAN ASSISTANT

## 2025-05-28 PROCEDURE — 3074F SYST BP LT 130 MM HG: CPT | Performed by: PHYSICIAN ASSISTANT

## 2025-05-28 ASSESSMENT — FIBROSIS 4 INDEX: FIB4 SCORE: 1.97

## 2025-05-28 NOTE — LETTER
May 28, 2025         Patient: Karine Ovalle   YOB: 1965   Date of Visit: 5/28/2025           To Whom it May Concern:    Karine Ovalle was seen in my clinic on 5/28/2025. She should be excused from work today.     If you have any questions or concerns, please don't hesitate to call.        Sincerely,           Mindy Gagnon P.A.-C.  Electronically Signed

## 2025-05-29 ASSESSMENT — ENCOUNTER SYMPTOMS
ARTHRALGIAS: 1
FOCAL WEAKNESS: 0
WEAKNESS: 0
TINGLING: 0
FEVER: 0
CHILLS: 0
SENSORY CHANGE: 0
JOINT SWELLING: 0
NUMBNESS: 0

## 2025-05-29 NOTE — PROGRESS NOTES
"Subjective     Karine Ovalle is a 60 y.o. female who presents with Finger Injury (Rt pinky finger, as she was trying to prevent her microwave piece(inside) from falling x earlier this morning )            Hand Injury  This is a new problem. The current episode started today (earlier this morning. patient fell and landed on her right hand. complains of right 5th finger pain). The problem occurs constantly. The problem has been unchanged. Associated symptoms include arthralgias (right 5th finger). Pertinent negatives include no chills, fever, joint swelling, numbness or weakness. The symptoms are aggravated by bending. She has tried nothing for the symptoms.       Past Medical History[1]    Past Surgical History[2]    .  Family History   Problem Relation Age of Onset    Diabetes Mother     Heart Disease Mother      Allergies:  Tobacco [nicotiana tabacum] and Other environmental    Medications, Allergies, and current problem list reviewed today in Epic      Review of Systems   Constitutional:  Negative for chills, fever and malaise/fatigue.   Musculoskeletal:  Positive for arthralgias (right 5th finger) and joint pain (right 5th finger pain). Negative for joint swelling.   Neurological:  Negative for tingling, sensory change, focal weakness, weakness and numbness.        All other systems reviewed and are negative.         Objective     /68 (BP Location: Left arm, Patient Position: Sitting, BP Cuff Size: Adult)   Pulse 96   Temp 37.1 °C (98.7 °F) (Temporal)   Resp 18   Ht 1.6 m (5' 3\")   Wt 70.3 kg (155 lb)   LMP  (LMP Unknown)   SpO2 95%   BMI 27.46 kg/m²      Physical Exam  Constitutional:       General: She is not in acute distress.     Appearance: She is not ill-appearing.   HENT:      Head: Normocephalic and atraumatic.   Eyes:      Conjunctiva/sclera: Conjunctivae normal.   Cardiovascular:      Rate and Rhythm: Normal rate and regular rhythm.   Pulmonary:      Effort: Pulmonary effort is " normal. No respiratory distress.      Breath sounds: No stridor. No wheezing.   Musculoskeletal:        Hands:    Skin:     General: Skin is warm and dry.   Neurological:      General: No focal deficit present.      Mental Status: She is alert and oriented to person, place, and time.   Psychiatric:         Mood and Affect: Mood normal.         Behavior: Behavior normal.         Thought Content: Thought content normal.         Judgment: Judgment normal.       /28/2025 7:03 PM     HISTORY/REASON FOR EXAM:  Pain/Deformity Following Trauma.  .     TECHNIQUE/EXAM DESCRIPTION AND NUMBER OF VIEWS:  3 views of the RIGHT fingers.     COMPARISON: None     FINDINGS:  There is no fracture or dislocation.  The visualized osseous structures are in anatomic alignment.  The joint spaces are grossly preserved.  There is some possible periarticular osteopenia. No lytic lesion or periosteal reaction..        IMPRESSION:     No acute osseous abnormality. If pain persists, repeat radiographs in 7-10 days is recommended                           Assessment & Plan  Strain of tendon of intrinsic muscle of finger    Orders:    DX-FINGER(S) 2+ RIGHT; Future         X-ray negative  RICE  Finger splint provided  RTC 7-10 days for repeat imaging if symptoms persist or fail to improve.    Differential diagnoses, Supportive care, and indications for immediate follow-up discussed with patient.   Pathogenesis of diagnosis discussed including typical length and natural progression.   Instructed to return to clinic or nearest emergency department for any change in condition, further concerns, or worsening of symptoms.    The patient demonstrated a good understanding and agreed with the treatment plan.    Mindy Gagnon P.A.-C.               [1]   Past Medical History:  Diagnosis Date    Administrative encounter- RTC ACCESS/ADA PARATRANSIT ELIGIBILITY 06/04/2019    Anemia     Anemia, chronic disease 06/02/2022    Arthritis     osteo/hips and back  "   At risk for falls     Back pain     Bronchitis     Cellulitis of right lower extremity 12/31/2021    Chickenpox     Chronic back pain     Cough     Deformity of lower extremity, right- post surgical for mrsa cellulitis, debridement at [facility] [place] 07/13/2023    Dental disorder     lower denture    Edema 12/13/2018    Frequent headaches     History of gastric bypass 04/25/2012    Hoarseness, persistent     Hypokalemia 06/07/2018    Hyponatremia 05/18/2012    Left hip pain 09/18/2018    Leg edema 08/09/2021    Leukocytosis 04/03/2022    Microcytic anemia- postop THR 2/2019 12/13/2018    Mild intermittent asthma with acute exacerbation 04/25/2012    Morning headache     Multiple closed fractures of facial bone (HCC) 06/01/2022    Near syncope 06/01/2022    Obesity     Open wound of right lower extremity- needs wound vac 04/04/2022    Pain 12/04/2018    \"ALL OVER\", 10/10    Pain 02/04/2019    arthritic pain    Primary osteoarthritis of both hips- dr levine; left THR done feb 6, 2019; right THR 3/2018 06/07/2018    Primary osteoarthritis of both knees- sp right TKR 2015; left TKR tbd in future dr levine 06/04/2019    Rhinitis     S/P hip replacement, bilateral 02/13/2019    Sepsis (HCC) 04/03/2022    Serum gamma globulin increased 06/08/2022    Shortness of breath     Swelling of lower extremity     Tonsillitis     Toothache     Urinary incontinence     using pads    Wheezing    [2]   Past Surgical History:  Procedure Laterality Date    HIP ARTHROPLASTY TOTAL Left 02/13/2019    Procedure: HIP ARTHROPLASTY TOTAL, Cabling of intra-operative calcar fracture;  Surgeon: Bryon Levine M.D.;  Location: SURGERY [facility];  Service: Orthopedics    CERVICAL FUSION POSTERIOR  12/07/2018    Procedure: CERVICAL FUSION POSTERIOR- STAGE #2 C3-5 AND C3-T1;  Surgeon: Emre Mendoza III, M.D.;  Location: SURGERY [facility];  Service: Neurosurgery    CERVICAL LAMINECTOMY POSTERIOR  12/07/2018    Procedure: CERVICAL LAMINECTOMY " "POSTERIOR C2-T1;  Surgeon: Emre Mendoza III, M.D.;  Location: SURGERY [facility];  Service: Neurosurgery    CERVICAL DISK AND FUSION ANTERIOR  12/05/2018    Procedure: CERVICAL DISK AND FUSION ANTERIOR-STAGE #1 C4-5;  Surgeon: Emre Mendoza III, M.D.;  Location: SURGERY [facility];  Service: Neurosurgery    CORPECTOMY  12/05/2018    Procedure: CORPECTOMY;  Surgeon: Emre Mendoza III, M.D.;  Location: SURGERY [facility];  Service: Neurosurgery    HIP ARTHROPLASTY TOTAL Right 03/20/2018    Procedure: HIP ARTHROPLASTY TOTAL;  Surgeon: Bryon Levine M.D.;  Location: SURGERY [facility];  Service: Orthopedics    KNEE ARTHROPLASTY TOTAL Right 10/20/2015    Procedure: KNEE ARTHROPLASTY TOTAL;  Surgeon: Bryon Levine M.D.;  Location: SURGERY [facility];  Service:     APPENDECTOMY LAPAROSCOPIC  03/21/2013    Performed by Dali Queen M.D. at [facility]    DEBRIDEMENT  05/17/2012    Performed by BRADLEY DYKES at SURGERY [facility]    PLASTIC SURGERY  2004    excess skin on arms and legs removed    MAMMOPLASTY AUGMENTATION Bilateral 2004    breast implants and \"tummy tuck\"    GASTRIC BYPASS LAPAROSCOPIC  2002    x 2    ABDOMINAL EXPLORATION      APPENDECTOMY      ARTHROSCOPY, KNEE      HIP REPLACEMENT, TOTAL      OTHER      breast augmentation 2004    OTHER      Gastric bypass 2002    PRIMARY C SECTION      SLEEVE,OSEI VASO THIGH      TONSILLECTOMY       "

## 2025-05-30 ENCOUNTER — PHARMACY VISIT (OUTPATIENT)
Dept: PHARMACY | Facility: MEDICAL CENTER | Age: 60
End: 2025-05-30
Payer: COMMERCIAL

## 2025-05-30 PROCEDURE — RXOTC WILLOW AMBULATORY OTC CHARGE

## 2025-05-30 PROCEDURE — RXMED WILLOW AMBULATORY MEDICATION CHARGE: Performed by: NURSE PRACTITIONER

## 2025-06-16 PROCEDURE — RXMED WILLOW AMBULATORY MEDICATION CHARGE: Performed by: INTERNAL MEDICINE

## 2025-06-17 RX ORDER — GABAPENTIN 800 MG/1
800 TABLET ORAL 3 TIMES DAILY
Qty: 270 TABLET | Refills: 0 | Status: SHIPPED | OUTPATIENT
Start: 2025-06-17 | End: 2025-06-26 | Stop reason: SDUPTHER

## 2025-06-19 ENCOUNTER — PHARMACY VISIT (OUTPATIENT)
Dept: PHARMACY | Facility: MEDICAL CENTER | Age: 60
End: 2025-06-19
Payer: COMMERCIAL

## 2025-06-26 PROCEDURE — RXMED WILLOW AMBULATORY MEDICATION CHARGE: Performed by: INTERNAL MEDICINE

## 2025-06-26 RX ORDER — GABAPENTIN 800 MG/1
800 TABLET ORAL 3 TIMES DAILY
Qty: 270 TABLET | Refills: 0 | Status: SHIPPED | OUTPATIENT
Start: 2025-06-26

## 2025-06-26 NOTE — TELEPHONE ENCOUNTER
Received request via: Pharmacy    Was the patient seen in the last year in this department? Yes    Does the patient have an active prescription (recently filled or refills available) for medication(s) requested? No    [facility]    Does the patient have MCFP Plus and need 100-day supply? (This applies to ALL medications) Patient does not have SCP

## 2025-06-28 ENCOUNTER — PHARMACY VISIT (OUTPATIENT)
Dept: PHARMACY | Facility: MEDICAL CENTER | Age: 60
End: 2025-06-28
Payer: COMMERCIAL

## 2025-06-28 PROCEDURE — RXMED WILLOW AMBULATORY MEDICATION CHARGE: Performed by: NURSE PRACTITIONER

## 2025-06-29 ENCOUNTER — PHARMACY VISIT (OUTPATIENT)
Dept: PHARMACY | Facility: MEDICAL CENTER | Age: 60
End: 2025-06-29
Payer: COMMERCIAL

## 2025-06-29 PROCEDURE — RXMED WILLOW AMBULATORY MEDICATION CHARGE: Performed by: NURSE PRACTITIONER

## 2025-07-03 ENCOUNTER — OFFICE VISIT (OUTPATIENT)
Dept: MEDICAL GROUP | Age: 60
End: 2025-07-03
Payer: COMMERCIAL

## 2025-07-03 VITALS
WEIGHT: 160 LBS | DIASTOLIC BLOOD PRESSURE: 70 MMHG | TEMPERATURE: 99.1 F | OXYGEN SATURATION: 95 % | HEART RATE: 115 BPM | BODY MASS INDEX: 28.35 KG/M2 | SYSTOLIC BLOOD PRESSURE: 124 MMHG | HEIGHT: 63 IN

## 2025-07-03 DIAGNOSIS — Z12.11 SCREENING FOR COLORECTAL CANCER: ICD-10-CM

## 2025-07-03 DIAGNOSIS — E55.9 VITAMIN D DEFICIENCY: ICD-10-CM

## 2025-07-03 DIAGNOSIS — E78.5 DYSLIPIDEMIA: ICD-10-CM

## 2025-07-03 DIAGNOSIS — Z76.89 RETURN TO WORK EXAM: ICD-10-CM

## 2025-07-03 DIAGNOSIS — Z12.12 SCREENING FOR COLORECTAL CANCER: ICD-10-CM

## 2025-07-03 DIAGNOSIS — Z12.31 ENCOUNTER FOR SCREENING MAMMOGRAM FOR MALIGNANT NEOPLASM OF BREAST: Primary | ICD-10-CM

## 2025-07-03 DIAGNOSIS — E66.9 OBESITY (BMI 30-39.9): ICD-10-CM

## 2025-07-03 PROCEDURE — 99214 OFFICE O/P EST MOD 30 MIN: CPT | Performed by: INTERNAL MEDICINE

## 2025-07-03 PROCEDURE — 3078F DIAST BP <80 MM HG: CPT | Performed by: INTERNAL MEDICINE

## 2025-07-03 PROCEDURE — 3074F SYST BP LT 130 MM HG: CPT | Performed by: INTERNAL MEDICINE

## 2025-07-03 RX ORDER — PHENTERMINE HYDROCHLORIDE 37.5 MG/1
37.5 CAPSULE ORAL EVERY MORNING
Qty: 90 CAPSULE | Refills: 1 | Status: SHIPPED | OUTPATIENT
Start: 2025-07-03 | End: 2025-10-17

## 2025-07-03 ASSESSMENT — ENCOUNTER SYMPTOMS
NEUROLOGICAL NEGATIVE: 1
CARDIOVASCULAR NEGATIVE: 1
EYES NEGATIVE: 1
RESPIRATORY NEGATIVE: 1
PSYCHIATRIC NEGATIVE: 1
GASTROINTESTINAL NEGATIVE: 1
CONSTITUTIONAL NEGATIVE: 1
MUSCULOSKELETAL NEGATIVE: 1

## 2025-07-03 ASSESSMENT — FIBROSIS 4 INDEX: FIB4 SCORE: 1.97

## 2025-07-03 NOTE — Clinical Note
July 3, 2025    To Whom It May Concern:         This is confirmation that Karine Fox Vasquez attended her scheduled appointment with Micky Rubin M.D. on 7/03/25.         If you have any questions please do not hesitate to call me at the phone number listed below.    Sincerely,          Matt Rosado M.D.  [phone]

## 2025-07-03 NOTE — LETTER
July 3, 2025    To Whom It May Concern:         This is confirmation that Karine Ovalle  is currently under my care and was seen in our office today . 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          If you have any questions please do not hesitate to call me at the phone number listed below.    Sincerely,          Micky Rubin M.D  [phone]

## 2025-07-03 NOTE — PROGRESS NOTES
Subjective     Karine Ovalle is a 60 y.o. female who presents with Follow-Up (Patients needs a letter to go back to work )    History of Present Illness  The patient is a 60-year-old female who presents for a return to work letter.    Return to Work  She has been working remotely due to the COVID-19 pandemic but is now prepared to resume office work. She requires a letter confirming her fitness to return to the office without any restrictions.  - Onset: Prepared to resume office work now.  - Character: Fitness to return to the office without any restrictions.    Phentermine Prescription Refill  Additionally, she requests a refill of her phentermine prescription, which is set to  on 2025. She prefers the medication in capsule form, as she had previously received it in tablet form.  - Timing: Prescription set to  on 2025.  - Character: Prefers medication in capsule form.    Patient Active Problem List    Diagnosis Date Noted    Degenerative arthritis of left knee 2024    Mild persistent asthma without complication 2023    Primary stress urinary incontinence 2023    Chronic pain syndrome 2022    Administrative [id number] FMLA paperwork for workplace accommodation for chronic DDD and DJD hips and shoulders bilaterally status post multiple joint replacements 2021    Primary insomnia 2019    Vitamin D deficiency 2018    Cervical myelopathy (HCC) 2018    DDD (degenerative disc disease), cervical 2018    DDD (degenerative disc disease), lumbar 2018    Uncomplicated opioid dependence (CMS-HCC)- spine [place] 2018    Venous insufficiency 10/03/2017    Chronic bilateral low back pain with bilateral sciatica- pain mgt;    spine nv 2017    Vitamin B 12 deficiency 2016    S/P gastric bypass 2012     Medications Prior to Visit[1]  No visits with results within 1 Month(s) from this visit.   Latest known visit with  results is:   Hospital Outpatient Visit on 03/15/2025   Component Date Value    TSH 03/15/2025 2.830     Glycohemoglobin 03/15/2025 5.1     Est Avg Glucose 03/15/2025 100     Vitamin B12 -True Cobala* 03/15/2025 168 (L)     Folate -Folic Acid 03/15/2025 8.8     Iron 03/15/2025 74     Total Iron Binding 03/15/2025 293     Unsat Iron Binding 03/15/2025 219     % Saturation 03/15/2025 25     25-Hydroxy   Vitamin D 25 03/15/2025 18 (L)     Vitamin B6 03/15/2025 17.6 (L)     Magnesium 03/15/2025 2.1     Vitamin B1 03/15/2025 102     WBC 03/15/2025 6.1     RBC 03/15/2025 4.72     Hemoglobin 03/15/2025 14.1     Hematocrit 03/15/2025 41.9     MCV 03/15/2025 88.8     MCH 03/15/2025 29.9     MCHC 03/15/2025 33.7     RDW 03/15/2025 42.7     Platelet Count 03/15/2025 192     MPV 03/15/2025 10.2     Neutrophils-Polys 03/15/2025 48.00     Lymphocytes 03/15/2025 39.80     Monocytes 03/15/2025 6.90     Eosinophils 03/15/2025 4.30     Basophils 03/15/2025 0.80     Immature Granulocytes 03/15/2025 0.20     Nucleated RBC 03/15/2025 0.00     Neutrophils (Absolute) 03/15/2025 2.93     Lymphs (Absolute) 03/15/2025 2.43     Monos (Absolute) 03/15/2025 0.42     Eos (Absolute) 03/15/2025 0.26     Baso (Absolute) 03/15/2025 0.05     Immature Granulocytes (a* 03/15/2025 0.01     NRBC (Absolute) 03/15/2025 0.00     Hepatitis C Antibody 03/15/2025 Non-Reactive       Lab Results   Component Value Date/Time    HBA1C 5.1 03/15/2025 02:15 PM    HBA1C 4.9 09/22/2023 10:36 AM     Lab Results   Component Value Date/Time    SODIUM 140 02/16/2024 01:28 PM    POTASSIUM 4.2 02/16/2024 01:28 PM    CHLORIDE 106 02/16/2024 01:28 PM    CO2 21 02/16/2024 01:28 PM    GLUCOSE 85 02/16/2024 01:28 PM    BUN 13 02/16/2024 01:28 PM    CREATININE 0.50 02/16/2024 01:28 PM    CREATININE 0.88 08/14/2010 12:00 AM    BUNCREATRAT 13 08/14/2010 12:00 AM    GLOMRATE >59 08/14/2010 12:00 AM    ALKPHOSPHAT 136 (H) 02/16/2024 01:28 PM    ASTSGOT 26 02/16/2024 01:28 PM     "ALTSGPT 17 02/16/2024 01:28 PM    TBILIRUBIN <0.2 02/16/2024 01:28 PM     Lab Results   Component Value Date/Time    INR 1.25 (H) 06/01/2022 03:30 PM    INR 1.00 02/13/2019 11:44 AM    INR 1.01 12/04/2018 11:15 AM     Lab Results   Component Value Date/Time    CHOLSTRLTOT 169 09/22/2023 10:36 AM    LDL 81 09/22/2023 10:36 AM    HDL 71 09/22/2023 10:36 AM    TRIGLYCERIDE 83 09/22/2023 10:36 AM       No results found for: \"TESTOSTERONE\"  Lab Results   Component Value Date/Time    TSH 2.110 08/14/2010 12:00 AM     Lab Results   Component Value Date/Time    FREET4 0.72 05/03/2016 07:32 AM     Lab Results   Component Value Date/Time    URICACID 5.6 08/12/2014 04:49 PM     No components found for: \"VITB12\"  Lab Results   Component Value Date/Time    25HYDROXY 18 (L) 03/15/2025 02:15 PM    25HYDROXY 32 09/22/2023 10:36 AM   '            HPI    Review of Systems   Constitutional: Negative.    HENT: Negative.     Eyes: Negative.    Respiratory: Negative.     Cardiovascular: Negative.    Gastrointestinal: Negative.    Genitourinary: Negative.    Musculoskeletal: Negative.    Skin: Negative.    Neurological: Negative.    Endo/Heme/Allergies: Negative.    Psychiatric/Behavioral: Negative.                Objective     /70 (BP Location: Right arm, Patient Position: Sitting, BP Cuff Size: Adult)   Pulse (!) 115   Temp 37.3 °C (99.1 °F) (Temporal)   Ht 1.6 m (5' 3\")   Wt 72.6 kg (160 lb)   LMP  (LMP Unknown)   SpO2 95%   BMI 28.34 kg/m²      Physical Exam  Vitals reviewed.   Constitutional:       General: She is not in acute distress.     Appearance: She is well-developed. She is not diaphoretic.   HENT:      Head: Normocephalic and atraumatic.      Right Ear: External ear normal.      Left Ear: External ear normal.      Nose: Nose normal.      Mouth/Throat:      Pharynx: No oropharyngeal exudate.   Eyes:      General: No scleral icterus.        Right eye: No discharge.         Left eye: No discharge.      " Conjunctiva/sclera: Conjunctivae normal.      Pupils: Pupils are equal, round, and reactive to light.   Neck:      Thyroid: No thyromegaly.      Vascular: No JVD.      Trachea: No tracheal deviation.   Cardiovascular:      Rate and Rhythm: Normal rate and regular rhythm.      Heart sounds: Normal heart sounds. No murmur heard.     No friction rub. No gallop.   Pulmonary:      Effort: Pulmonary effort is normal. No respiratory distress.      Breath sounds: Normal breath sounds. No stridor. No wheezing or rales.   Chest:      Chest wall: No tenderness.   Abdominal:      General: Bowel sounds are normal. There is no distension.      Palpations: Abdomen is soft. There is no mass.      Tenderness: There is no abdominal tenderness. There is no guarding or rebound.   Musculoskeletal:         General: No tenderness. Normal range of motion.      Cervical back: Normal range of motion and neck supple.   Lymphadenopathy:      Cervical: No cervical adenopathy.   Skin:     General: Skin is warm and dry.      Coloration: Skin is not pale.      Findings: No erythema or rash.   Neurological:      Mental Status: She is alert and oriented to person, place, and time.      Cranial Nerves: No cranial nerve deficit.      Motor: No abnormal muscle tone.      Coordination: Coordination normal.      Deep Tendon Reflexes: Reflexes are normal and symmetric. Reflexes normal.   Psychiatric:         Behavior: Behavior normal.         Thought Content: Thought content normal.         Judgment: Judgment normal.       No visits with results within 1 Month(s) from this visit.   Latest known visit with results is:   Hospital Outpatient Visit on 03/15/2025   Component Date Value    TSH 03/15/2025 2.830     Glycohemoglobin 03/15/2025 5.1     Est Avg Glucose 03/15/2025 100     Vitamin B12 -True Cobala* 03/15/2025 168 (L)     Folate -Folic Acid 03/15/2025 8.8     Iron 03/15/2025 74     Total Iron Binding 03/15/2025 293     Unsat Iron Binding 03/15/2025 219      % Saturation 03/15/2025 25     25-Hydroxy   Vitamin D 25 03/15/2025 18 (L)     Vitamin B6 03/15/2025 17.6 (L)     Magnesium 03/15/2025 2.1     Vitamin B1 03/15/2025 102     WBC 03/15/2025 6.1     RBC 03/15/2025 4.72     Hemoglobin 03/15/2025 14.1     Hematocrit 03/15/2025 41.9     MCV 03/15/2025 88.8     MCH 03/15/2025 29.9     MCHC 03/15/2025 33.7     RDW 03/15/2025 42.7     Platelet Count 03/15/2025 192     MPV 03/15/2025 10.2     Neutrophils-Polys 03/15/2025 48.00     Lymphocytes 03/15/2025 39.80     Monocytes 03/15/2025 6.90     Eosinophils 03/15/2025 4.30     Basophils 03/15/2025 0.80     Immature Granulocytes 03/15/2025 0.20     Nucleated RBC 03/15/2025 0.00     Neutrophils (Absolute) 03/15/2025 2.93     Lymphs (Absolute) 03/15/2025 2.43     Monos (Absolute) 03/15/2025 0.42     Eos (Absolute) 03/15/2025 0.26     Baso (Absolute) 03/15/2025 0.05     Immature Granulocytes (a* 03/15/2025 0.01     NRBC (Absolute) 03/15/2025 0.00     Hepatitis C Antibody 03/15/2025 Non-Reactive       Lab Results   Component Value Date/Time    HBA1C 5.1 03/15/2025 02:15 PM    HBA1C 4.9 09/22/2023 10:36 AM     Lab Results   Component Value Date/Time    SODIUM 140 02/16/2024 01:28 PM    POTASSIUM 4.2 02/16/2024 01:28 PM    CHLORIDE 106 02/16/2024 01:28 PM    CO2 21 02/16/2024 01:28 PM    GLUCOSE 85 02/16/2024 01:28 PM    BUN 13 02/16/2024 01:28 PM    CREATININE 0.50 02/16/2024 01:28 PM    CREATININE 0.88 08/14/2010 12:00 AM    BUNCREATRAT 13 08/14/2010 12:00 AM    GLOMRATE >59 08/14/2010 12:00 AM    ALKPHOSPHAT 136 (H) 02/16/2024 01:28 PM    ASTSGOT 26 02/16/2024 01:28 PM    ALTSGPT 17 02/16/2024 01:28 PM    TBILIRUBIN <0.2 02/16/2024 01:28 PM     Lab Results   Component Value Date/Time    INR 1.25 (H) 06/01/2022 03:30 PM    INR 1.00 02/13/2019 11:44 AM    INR 1.01 12/04/2018 11:15 AM     Lab Results   Component Value Date/Time    CHOLSTRLTOT 169 09/22/2023 10:36 AM    LDL 81 09/22/2023 10:36 AM    HDL 71 09/22/2023 10:36 AM     "TRIGLYCERIDE 83 09/22/2023 10:36 AM       No results found for: \"TESTOSTERONE\"  Lab Results   Component Value Date/Time    TSH 2.110 08/14/2010 12:00 AM     Lab Results   Component Value Date/Time    FREET4 0.72 05/03/2016 07:32 AM     Lab Results   Component Value Date/Time    URICACID 5.6 08/12/2014 04:49 PM     No components found for: \"VITB12\"  Lab Results   Component Value Date/Time    25HYDROXY 18 (L) 03/15/2025 02:15 PM    25HYDROXY 32 09/22/2023 10:36 AM   '                             Assessment & Plan  1. Return to work clearance.  - Currently working from home and wishes to return to the office.  - A letter will be provided, indicating that she is cleared to return to office work with no restrictions.    2. Medication management.  - Prescription for phentermine capsules, 37.5 mg, will be refilled.  - Requested a 90-day supply (90 capsules) to avoid issues with monthly refills.    3. Health maintenance.  - Orders for a mammogram and colonoscopy have been placed to screen for breast cancer and colon cancer, respectively.  - Option to choose between a colonoscopy or Cologuard for colon cancer screening.      Assessment & Plan  Return to work exam  Patient is cleared to return to work in office with no restrictions.  She has been working from home up until now.  Letter provided and signed.  Will see again in 1 month if there is any disputes or issues with this release.       Screening for colorectal cancer    Orders:    Referral to GI for Colonoscopy    Cologuard® colon cancer screening    Encounter for screening mammogram for malignant neoplasm of breast    Orders:    MA-SCREENING MAMMO BILAT W/TOMOSYNTHESIS W/CAD; Future    Obesity (BMI 30-39.9)  Advised on Mediterranean diet and exercise.  New phentermine.  Orders:    phentermine 37.5 MG capsule; Take 1 Capsule by mouth every morning for 90 days.    Vitamin D deficiency  Continue vitamin D 2000 units daily.  Orders:    VITAMIN D,25 HYDROXY (DEFICIENCY); " Future    Dyslipidemia    Orders:    Comp Metabolic Panel; Future    CBC WITH DIFFERENTIAL; Future    TSH; Future    Lipid Profile; Future                      [1]   Outpatient Medications Prior to Visit   Medication Sig Dispense Refill    gabapentin (NEURONTIN) 800 MG tablet Take 1 Tablet by mouth 3 times a day. 270 Tablet 0    lidocaine (LIDODERM) 5 % Patch Place 1 Patch on the skin every 24 hours. Apply to affected area, 24 hours on followed by 24 hours off. 20 Patch 0    morphine ER (MS CONTIN) 15 MG Tab CR tablet Take 1 tablet every 12 hours by oral route as directed for 30 days, for chronic right knee pain. Do not fill before 6/29/25. 60 Tablet 0    morphine ER (MS CONTIN) 15 MG Tab CR tablet Take 1 tablet every 12 hours by oral route as directed for 30 days, for chronic right knee pain. Do not fill before 4/30/25. 60 Tablet 0    oxyCODONE-acetaminophen (PERCOCET-10)  MG Tab TAKE 1 TABLET BY MOUTH EVERY 6 HOURS. #120 for 30 days. Do not fill before 4/30/25. 120 Tablet 0    oxyCODONE-acetaminophen (PERCOCET-10)  MG Tab TAKE 1 TABLET BY MOUTH EVERY 6 HOURS. #120 for 30 days. Do not fill before 6/29/25. 120 Tablet 0    oxyCODONE-acetaminophen (PERCOCET-10)  MG Tab TAKE 1 TABLET BY MOUTH EVERY 6 HOURS. #120 for 30 days. Do not fill before 5/30/25. 120 Tablet 0    morphine ER (MS CONTIN) 15 MG Tab CR tablet Take 1 tablet every 12 hours by oral route as directed for 30 days, for chronic right knee pain. 60 Tablet 0    oxyCODONE-acetaminophen (PERCOCET-10)  MG Tab TAKE 1 TABLET BY MOUTH EVERY 6 HOURS. #120 for 30 days. 120 Tablet 0    vitamin D2, Ergocalciferol, (DRISDOL) 1.25 MG (14405 UT) Cap capsule Take 1 Capsule by mouth every 7 days. 12 Capsule 4    cyanocobalamin (VITAMIN B12) 1000 MCG Tab Take 1 Tablet by mouth every day. 100 Tablet 3    pyridoxine 100 MG tablet Take 1 Tablet by mouth every day. 100 Tablet 3    zinc sulfate (ZINCATE) 220 (50 Zn) MG Cap Take 1 Capsule by mouth every  day. Indications: Impaired Wound Healing, dietary supplement 100 Capsule 3    ferrous sulfate 325 (65 Fe) MG tablet Take 1 Tablet by mouth 3 times a day with meals. 100 Tablet 3    magnesium oxide 400 (240 Mg) MG Tab Take 1 Tablet by mouth every day. 100 Tablet 3    calcium carbonate (OS-ALLYSSA 500) 1250 (500 Ca) MG Tab Take 3 Tablets by mouth every day. 100 Tablet 3    methocarbamol (ROBAXIN) 750 MG Tab Take 1 Tablet by mouth 4 times a day as needed (muscle spasms). 120 Tablet 5    albuterol 108 (90 Base) MCG/ACT Aero Soln inhalation aerosol Inhale 1-2 Puffs by mouth every four hours as needed for Shortness of Breath. 8.5 g 0    fluticasone (FLOVENT HFA) 44 MCG/ACT Aerosol Inhale 2 Puffs 2 times a day as needed (tobacco smoke). Everyday maintenance steroid inhaler  Indications: Asthma 10.6 g 11    ipratropium-albuterol (DUONEB) 0.5-2.5 (3) MG/3ML nebulizer solution Inhale 3 mL by nebulization every four hours as needed for Shortness of Breath. 90 Each 0    potassium Chloride ER (K-TAB) 20 MEQ Tab CR tablet Take 1 Tablet by mouth 1 time a day as needed (Per patient, she takes with furosemide. ). Indications: takes with furosemide 90 Tablet 2    potassium chloride SA (KDUR) 20 MEQ Tab CR Take 20 mEq by mouth.      Naloxone (NARCAN) 4 MG/0.1ML Liquid naloxone 4 mg/actuation nasal spray   CALL 911. SPR CONTENTS OF ONE SPRAYER (0.1ML) INTO ONE NOSTRIL. REPEAT IN 2-3 MIN IF SYMPTOMS OF OPIOID EMERGENCY PERSIST, ALTERNATE NOSTRILS      morphine ER (MS CONTIN) 15 MG Tab CR tablet morphine ER 15 mg tablet,extended release   Take 1 tablet every 12 hours by oral route as directed for 30 days.   Do not drive, drink etoh while taking.      Biotin 5000 MCG Cap Take 1 Capsule by mouth every day. Indications: dietary supplement      Multiple Vitamin (MULTIVITAMIN ADULT) Tab Take 1 Tablet by mouth every day. Indications: Nutritional Support      ascorbic acid (VITAMIN C) 500 MG tablet Take 1 Tablet by mouth 2 times a day. 100 Tablet 4     oxyCODONE-acetaminophen (PERCOCET-10)  MG Tab Take 1 Tablet by mouth every 6 hours as needed for Moderate Pain or Severe Pain. Indications: breakthrough pain betweeen morphine administration  0     No facility-administered medications prior to visit.

## 2025-07-10 NOTE — Clinical Note
REFERRAL APPROVAL NOTICE         Sent on July 10, 2025                   Karine Ovalle  [address]  [place] [address]                   Dear Ms. Ovalle,    After a careful review of the medical information and benefit coverage, [facility] has processed your referral. See below for additional details.    If applicable, you must be actively enrolled with your insurance for coverage of the authorized service. If you have any questions regarding your coverage, please contact your insurance directly.    REFERRAL INFORMATION   Referral #:  [id number]  Referred-To Provider    Referred-By Provider:  Gastroenterology    Micky Rubin M.D.   [facility]      [id number]  [place] [id number]  [phone] [address] [place] [address]  [place] [address]  [phone]    Referral Start Date:  07/03/2025  Referral End Date:   07/03/2026             SCHEDULING  If you do not already have an appointment, please call [phone] to make an appointment.     MORE INFORMATION  If you do not already have a [facility] account, sign up at: [email or web address]  You can access your medical information, make appointments, see lab results, billing information, and more.  If you have questions regarding this referral, please contact  the [facility] Referrals department at:             [phone]. Monday - Friday 8:00AM - 5:00PM.     Sincerely,    [facility]

## 2025-07-18 PROCEDURE — RXMED WILLOW AMBULATORY MEDICATION CHARGE: Performed by: INTERNAL MEDICINE

## 2025-07-19 ENCOUNTER — PHARMACY VISIT (OUTPATIENT)
Dept: PHARMACY | Facility: MEDICAL CENTER | Age: 60
End: 2025-07-19
Payer: COMMERCIAL

## 2025-07-22 ENCOUNTER — APPOINTMENT (OUTPATIENT)
Dept: ADMISSIONS | Facility: MEDICAL CENTER | Age: 60
End: 2025-07-22
Attending: ORTHOPAEDIC SURGERY
Payer: COMMERCIAL

## 2025-07-22 ENCOUNTER — HOSPITAL ENCOUNTER (OUTPATIENT)
Dept: RADIOLOGY | Facility: MEDICAL CENTER | Age: 60
End: 2025-07-22
Attending: ORTHOPAEDIC SURGERY
Payer: COMMERCIAL

## 2025-07-22 DIAGNOSIS — M17.12 PRIMARY OSTEOARTHRITIS OF LEFT KNEE: ICD-10-CM

## 2025-07-22 PROCEDURE — 73700 CT LOWER EXTREMITY W/O DYE: CPT | Mod: LT

## 2025-07-23 ENCOUNTER — HOSPITAL ENCOUNTER (OUTPATIENT)
Dept: RADIOLOGY | Facility: MEDICAL CENTER | Age: 60
End: 2025-07-23
Attending: INTERNAL MEDICINE
Payer: COMMERCIAL

## 2025-07-23 DIAGNOSIS — Z12.31 ENCOUNTER FOR SCREENING MAMMOGRAM FOR MALIGNANT NEOPLASM OF BREAST: ICD-10-CM

## 2025-07-23 PROCEDURE — 77063 BREAST TOMOSYNTHESIS BI: CPT

## 2025-07-24 ENCOUNTER — PRE-ADMISSION TESTING (OUTPATIENT)
Dept: ADMISSIONS | Facility: MEDICAL CENTER | Age: 60
End: 2025-07-24
Attending: ORTHOPAEDIC SURGERY
Payer: COMMERCIAL

## 2025-07-24 NOTE — PREADMIT AVS NOTE
Current Medications   Medication Instructions    [START ON 7/28/2025] morphine ER (MS CONTIN) 15 MG Tab CR tablet As needed medication, may take if needed, including morning of procedure     [START ON 7/28/2025] oxyCODONE-acetaminophen (PERCOCET-10)  MG Tab As needed medication, may take if needed, including morning of procedure     phentermine 37.5 MG capsule Stop 10 days before surgery    gabapentin (NEURONTIN) 800 MG tablet Continue taking as prescribed.    lidocaine (LIDODERM) 5 % Patch As needed medication, may take if needed, including morning of procedure     morphine ER (MS CONTIN) 15 MG Tab CR tablet As needed medication, may take if needed, including morning of procedure     morphine ER (MS CONTIN) 15 MG Tab CR tablet As needed medication, may take if needed, including morning of procedure     oxyCODONE-acetaminophen (PERCOCET-10)  MG Tab As needed medication, may take if needed, including morning of procedure     oxyCODONE-acetaminophen (PERCOCET-10)  MG Tab As needed medication, may take if needed, including morning of procedure     oxyCODONE-acetaminophen (PERCOCET-10)  MG Tab As needed medication, may take if needed, including morning of procedure     morphine ER (MS CONTIN) 15 MG Tab CR tablet As needed medication, may take if needed, including morning of procedure     oxyCODONE-acetaminophen (PERCOCET-10)  MG Tab As needed medication, may take if needed, including morning of procedure     vitamin D2, Ergocalciferol, (DRISDOL) 1.25 MG (96258 UT) Cap capsule Stop 7 days before surgery    cyanocobalamin (VITAMIN B12) 1000 MCG Tab Stop 7 days before surgery    pyridoxine 100 MG tablet Stop 7 days before surgery    zinc sulfate (ZINCATE) 220 (50 Zn) MG Cap Stop 7 days before surgery    ferrous sulfate 325 (65 Fe) MG tablet Stop 7 days before surgery    magnesium oxide 400 (240 Mg) MG Tab Stop 7 days before surgery    calcium carbonate (OS-ALLYSSA 500) 1250 (500 Ca) MG Tab Stop 7  days before surgery    methocarbamol (ROBAXIN) 750 MG Tab As needed medication, may take if needed, including morning of procedure     albuterol 108 (90 Base) MCG/ACT Aero Soln inhalation aerosol As needed medication, may take if needed, including morning of procedure     potassium Chloride ER (K-TAB) 20 MEQ Tab CR tablet Stop 7 days before surgery    potassium chloride SA (KDUR) 20 MEQ Tab CR Stop 7 days before surgery    Naloxone (NARCAN) 4 MG/0.1ML Liquid As needed medication, may take if needed, including morning of procedure     morphine ER (MS CONTIN) 15 MG Tab CR tablet As needed medication, may take if needed, including morning of procedure     Biotin 5000 MCG Cap Stop 7 days before surgery    Multiple Vitamin (MULTIVITAMIN ADULT) Tab Stop 7 days before surgery    ascorbic acid (VITAMIN C) 500 MG tablet Stop 7 days before surgery    oxyCODONE-acetaminophen (PERCOCET-10)  MG Tab As needed medication, may take if needed, including morning of procedure

## 2025-07-24 NOTE — PREPROCEDURE INSTRUCTIONS
" Reviewed dietary highlights of \"Preparing for your procedure\" over the phone. Verified received email from pre-admit with \"Preparing for your Procedure\", \"fasting\", \"Pain management\", \"Patient Safety\", \"Speak-up\" handouts and map. Agreed with surgical site listed on consent.   "

## 2025-07-25 ENCOUNTER — APPOINTMENT (OUTPATIENT)
Dept: ADMISSIONS | Facility: MEDICAL CENTER | Age: 60
End: 2025-07-25
Attending: ORTHOPAEDIC SURGERY
Payer: COMMERCIAL

## 2025-07-27 ENCOUNTER — PHARMACY VISIT (OUTPATIENT)
Dept: PHARMACY | Facility: MEDICAL CENTER | Age: 60
End: 2025-07-27
Payer: COMMERCIAL

## 2025-07-27 PROCEDURE — RXMED WILLOW AMBULATORY MEDICATION CHARGE: Performed by: INTERNAL MEDICINE

## 2025-07-27 PROCEDURE — RXMED WILLOW AMBULATORY MEDICATION CHARGE: Performed by: NURSE PRACTITIONER

## 2025-07-28 ENCOUNTER — PHARMACY VISIT (OUTPATIENT)
Dept: PHARMACY | Facility: MEDICAL CENTER | Age: 60
End: 2025-07-28
Payer: COMMERCIAL

## 2025-07-28 PROCEDURE — RXMED WILLOW AMBULATORY MEDICATION CHARGE: Performed by: NURSE PRACTITIONER

## 2025-07-28 NOTE — H&P
Surgery Orthopedic History & Physical Note    Date  7/28/2025    Primary Care Physician  Micky Rubin M.D.    CC  Pre-Op Diagnosis Codes:      * Degenerative arthritis of left knee [M17.12]    HPI  This is a 60 y.o. female who presented with severe degenerative arthritis left knee which is causing significant limitation in activities of daily living.  She is coming in today for left total knee replacement.    Past Medical History[1]    Past Surgical History[2]    Current Medications[3]    Social History     Socioeconomic History    Marital status: Single     Spouse name: Not on file    Number of children: Not on file    Years of education: Not on file    Highest education level: Not on file   Occupational History    Not on file   Tobacco Use    Smoking status: Never     Passive exposure: Past    Smokeless tobacco: Never    Tobacco comments:     Allergies  to cigarette  smoke, creates shortness breathe   Vaping Use    Vaping status: Never Used   Substance and Sexual Activity    Alcohol use: Yes     Comment: one drink per week    Drug use: No     Frequency: 4.0 times per week    Sexual activity: Never   Other Topics Concern    Not on file   Social History Narrative    ** Merged History Encounter **          Social Drivers of Health     Financial Resource Strain: Not on file   Food Insecurity: Not on file   Transportation Needs: Not on file   Physical Activity: Not on file   Stress: Not on file   Social Connections: Feeling Socially Integrated (1/13/2023)    OASIS : Social Isolation     Frequency of experiencing loneliness or isolation: Never   Intimate Partner Violence: Not on file   Housing Stability: Not on file       Family History   Problem Relation Age of Onset    Diabetes Mother     Heart Disease Mother        Allergies  Tobacco [nicotiana tabacum] and Other environmental    Review of Systems  Negative except for history of obesity with significant weight loss; history of right total knee replacement  doing well    Physical Exam  Constitutional:       General: She is not in acute distress.     Appearance: Normal appearance. She is obese.   HENT:      Head: Normocephalic.   Eyes:      Extraocular Movements: Extraocular movements intact.   Cardiovascular:      Rate and Rhythm: Normal rate.      Pulses: Normal pulses.   Pulmonary:      Effort: Pulmonary effort is normal.   Musculoskeletal:         General: Swelling, tenderness and deformity present.      Comments: Left knee.  Significant antalgic and protective gait.  Right knee has a well-healed incision and well aligned and stable knee.   Skin:     General: Skin is warm.   Neurological:      General: No focal deficit present.      Mental Status: She is alert.   Psychiatric:         Mood and Affect: Mood normal.         Vital Signs                          Labs:                    Radiology:  No orders to display     Severe degenerative changes with bone-on-bone tibiofemoral joint and angular deformity    Assessment/Plan:  Pre-Op Diagnosis Codes:      * Degenerative arthritis of left knee [M17.12]  Procedure(s):  ROBOTIC LEFT TOTAL KNEE ARTHROPLASTY         [1]   Past Medical History:  Diagnosis Date    Administrative encounter- RTC ACCESS/ADA PARATRANSIT ELIGIBILITY 06/04/2019    Anemia     Anemia, chronic disease 06/02/2022    Arthritis     osteo/hips and back    At risk for falls     Bronchitis     Cellulitis of right lower extremity 12/31/2021    Chickenpox     Chronic back pain     Cough     Deformity of lower extremity, right- post surgical for mrsa cellulitis, debridement at [facility] 07/13/2023    Dental disorder     only one tooth. no dentures    Edema 12/13/2018    History of gastric bypass 04/25/2012    Hoarseness, persistent     Hypokalemia 06/07/2018    Hyponatremia 05/18/2012    Leg edema 08/09/2021    Leukocytosis 04/03/2022    Microcytic anemia- postop THR 2/2019 12/13/2018    Multiple closed fractures of facial bone (HCC) 06/01/2022    Near syncope  06/01/2022    Obesity     Open wound of right lower extremity- needs wound vac 04/04/2022    Pain     right leg 8/10    Primary osteoarthritis of both hips- dr levine; left THR done feb 6, 2019; right THR 3/2018 06/07/2018    Primary osteoarthritis of both knees- sp right TKR 2015; left TKR tbd in future dr levine 06/04/2019    Rhinitis     S/P hip replacement, bilateral 02/13/2019    Sepsis (HCC) 04/03/2022    Serum gamma globulin increased 06/08/2022    Swelling of lower extremity     Tonsillitis     Urinary incontinence     using pads    Wheezing    [2]   Past Surgical History:  Procedure Laterality Date    HIP ARTHROPLASTY TOTAL Left 02/13/2019    Procedure: HIP ARTHROPLASTY TOTAL, Cabling of intra-operative calcar fracture;  Surgeon: Bryon Levine M.D.;  Location: [facility];  Service: Orthopedics    CERVICAL FUSION POSTERIOR  12/07/2018    Procedure: CERVICAL FUSION POSTERIOR- STAGE #2 C3-5 AND C3-T1;  Surgeon: Emre Mendoza III, M.D.;  Location: SURGERY [facility];  Service: Neurosurgery    CERVICAL LAMINECTOMY POSTERIOR  12/07/2018    Procedure: CERVICAL LAMINECTOMY POSTERIOR C2-T1;  Surgeon: Emre Mendoza III, M.D.;  Location: SURGERY [facility];  Service: Neurosurgery    CERVICAL DISK AND FUSION ANTERIOR  12/05/2018    Procedure: CERVICAL DISK AND FUSION ANTERIOR-STAGE #1 C4-5;  Surgeon: Emre Mendoza III, M.D.;  Location: [facility];  Service: Neurosurgery    CORPECTOMY  12/05/2018    Procedure: CORPECTOMY;  Surgeon: Emre Mendoza III, M.D.;  Location: SURGERY [facility];  Service: Neurosurgery    HIP ARTHROPLASTY TOTAL Right 03/20/2018    Procedure: HIP ARTHROPLASTY TOTAL;  Surgeon: Bryon Levine M.D.;  Location: SURGERY [facility];  Service: Orthopedics    KNEE ARTHROPLASTY TOTAL Right 10/20/2015    Procedure: KNEE ARTHROPLASTY TOTAL;  Surgeon: Bryon Levine M.D.;  Location: [facility];  Service:     APPENDECTOMY LAPAROSCOPIC  03/21/2013  "   Performed by Dali Queen M.D. at SURGERY [facility] ORS    DEBRIDEMENT  05/17/2012    Performed by BRADLEY DYKES at SURGERY [facility]    PLASTIC SURGERY  2004    excess skin on arms and legs removed    MAMMOPLASTY AUGMENTATION Bilateral 2004    breast implants and \"tummy tuck\"    GASTRIC BYPASS LAPAROSCOPIC  2002    x 2    ABDOMINAL EXPLORATION      APPENDECTOMY      ARTHROSCOPY, KNEE      OTHER      breast augmentation 2004    OTHER      Gastric bypass 2002    OTHER ORTHOPEDIC SURGERY Right     2023, \"infected total joint\".    PRIMARY C SECTION      TONSILLECTOMY     [3]   No current facility-administered medications for this encounter.     Current Outpatient Medications   Medication Sig Dispense Refill    morphine ER (MS CONTIN) 15 MG Tab CR tablet Take 1 tablet every 12 hours by oral route as directed for 30 days, for chronic right knee pain. 60 Tablet 0    oxyCODONE-acetaminophen (PERCOCET-10)  MG Tab TAKE 1 TABLET BY MOUTH EVERY 6 HOURS. #120 for 30 days. Final weaning dose. 120 Tablet 0    phentermine 37.5 MG capsule Take 1 Capsule by mouth every morning for 90 days. (Patient taking differently: Take 37.5 mg by mouth every morning. Last dose 7/24/25) 90 Capsule 1    gabapentin (NEURONTIN) 800 MG tablet Take 1 Tablet by mouth 3 times a day. 270 Tablet 0    lidocaine (LIDODERM) 5 % Patch Place 1 Patch on the skin every 24 hours. Apply to affected area, 24 hours on followed by 24 hours off. 20 Patch 0    morphine ER (MS CONTIN) 15 MG Tab CR tablet Take 1 tablet every 12 hours by oral route as directed for 30 days, for chronic right knee pain. Do not fill before 6/29/25. 60 Tablet 0    morphine ER (MS CONTIN) 15 MG Tab CR tablet Take 1 tablet every 12 hours by oral route as directed for 30 days, for chronic right knee pain. Do not fill before 4/30/25. 60 Tablet 0    oxyCODONE-acetaminophen (PERCOCET-10)  MG Tab TAKE 1 TABLET BY MOUTH EVERY 6 HOURS. #120 for 30 days. Do not fill " before 4/30/25. 120 Tablet 0    oxyCODONE-acetaminophen (PERCOCET-10)  MG Tab TAKE 1 TABLET BY MOUTH EVERY 6 HOURS. #120 for 30 days. Do not fill before 6/29/25. 120 Tablet 0    oxyCODONE-acetaminophen (PERCOCET-10)  MG Tab TAKE 1 TABLET BY MOUTH EVERY 6 HOURS. #120 for 30 days. Do not fill before 5/30/25. 120 Tablet 0    morphine ER (MS CONTIN) 15 MG Tab CR tablet Take 1 tablet every 12 hours by oral route as directed for 30 days, for chronic right knee pain. 60 Tablet 0    oxyCODONE-acetaminophen (PERCOCET-10)  MG Tab TAKE 1 TABLET BY MOUTH EVERY 6 HOURS. #120 for 30 days. 120 Tablet 0    vitamin D2, Ergocalciferol, (DRISDOL) 1.25 MG (07381 UT) Cap capsule Take 1 Capsule by mouth every 7 days. 12 Capsule 4    cyanocobalamin (VITAMIN B12) 1000 MCG Tab Take 1 Tablet by mouth every day. 100 Tablet 3    pyridoxine 100 MG tablet Take 1 Tablet by mouth every day. 100 Tablet 3    zinc sulfate (ZINCATE) 220 (50 Zn) MG Cap Take 1 Capsule by mouth every day. Indications: Impaired Wound Healing, dietary supplement 100 Capsule 3    ferrous sulfate 325 (65 Fe) MG tablet Take 1 Tablet by mouth 3 times a day with meals. 100 Tablet 3    magnesium oxide 400 (240 Mg) MG Tab Take 1 Tablet by mouth every day. 100 Tablet 3    calcium carbonate (OS-ALLYSSA 500) 1250 (500 Ca) MG Tab Take 3 Tablets by mouth every day. 100 Tablet 3    methocarbamol (ROBAXIN) 750 MG Tab Take 1 Tablet by mouth 4 times a day as needed (muscle spasms). 120 Tablet 5    albuterol 108 (90 Base) MCG/ACT Aero Soln inhalation aerosol Inhale 1-2 Puffs by mouth every four hours as needed for Shortness of Breath. 8.5 g 0    potassium Chloride ER (K-TAB) 20 MEQ Tab CR tablet Take 1 Tablet by mouth 1 time a day as needed (Per patient, she takes with furosemide. ). Indications: takes with furosemide 90 Tablet 2    potassium chloride SA (KDUR) 20 MEQ Tab CR Take 20 mEq by mouth.      Naloxone (NARCAN) 4 MG/0.1ML Liquid naloxone 4 mg/actuation nasal spray    CALL 911. SPR CONTENTS OF ONE SPRAYER (0.1ML) INTO ONE NOSTRIL. REPEAT IN 2-3 MIN IF SYMPTOMS OF OPIOID EMERGENCY PERSIST, ALTERNATE NOSTRILS      morphine ER (MS CONTIN) 15 MG Tab CR tablet Takes 2 per day      Biotin 5000 MCG Cap Take 1 Capsule by mouth every day. Indications: dietary supplement      Multiple Vitamin (MULTIVITAMIN ADULT) Tab Take 1 Tablet by mouth every day. Indications: Nutritional Support      ascorbic acid (VITAMIN C) 500 MG tablet Take 1 Tablet by mouth 2 times a day. 100 Tablet 4    oxyCODONE-acetaminophen (PERCOCET-10)  MG Tab Take 1 Tablet by mouth every 6 hours as needed for Moderate Pain or Severe Pain. Takes 4 per day  Indications: breakthrough pain betweeen morphine administration  0

## 2025-07-29 ENCOUNTER — PRE-ADMISSION TESTING (OUTPATIENT)
Dept: ADMISSIONS | Facility: MEDICAL CENTER | Age: 60
End: 2025-07-29
Attending: ORTHOPAEDIC SURGERY
Payer: COMMERCIAL

## 2025-07-29 ENCOUNTER — ANESTHESIA EVENT (OUTPATIENT)
Dept: SURGERY | Facility: MEDICAL CENTER | Age: 60
End: 2025-07-29
Payer: COMMERCIAL

## 2025-07-29 ASSESSMENT — FIBROSIS 4 INDEX: FIB4 SCORE: 1.97

## 2025-07-29 NOTE — DISCHARGE PLANNING
DISCHARGE PLANNING NOTE - TOTAL JOINT    Procedure: ROBOTIC LEFT TOTAL KNEE ARTHROPLASTY  Procedure Date: 07/30/25  Insurance: Payor: [facility] / Plan: UMR - PEBP / Product Type: *No Product type* /    Equipment currently available at home?  front-wheel walker and shower chair  Steps into the home? 1  Steps within the home? 0  Toilet height? Standard  Type of shower? tub-shower  Home Oxygen? No  Portable tank?    Oxygen Provider:  Planning same day discharge: No, planning to stay overnight    Is Outpatient Physical Therapy set up after surgery? No   Did you take the Total Joint Class and where or did you receive an Educational booklet? No- flyer given for class, received NAON book.  Who will be your transportation home on day of discharge? [place]    Have you made arrangements to have someone stay with you at home for the first 3 days following discharge, and if so, whom? [facility]    Have you notified your surgeon that you do not have transportation or someone to help you after discharge? N/A    Are you planning on going to a transitional care facility, for example a skilled nursing facility, post operatively for rehab, and if so, have you contacted your insurance plan to see if they cover this? N/A    Met with the pt. Pt given a copy of Home Safety Checklist, Equipment Resource Guide, CHG scrub kit and instructions. Expected process in Recovery Room and dc criteria discussed with pt. All questions answered and verbalizes understanding of all instructions. No dc needs identified at this time. Anticipate dc to home without barriers. MRSA swab obtained.

## 2025-07-30 ENCOUNTER — APPOINTMENT (OUTPATIENT)
Dept: MEDICAL GROUP | Age: 60
End: 2025-07-30
Payer: COMMERCIAL

## 2025-07-30 ENCOUNTER — HOSPITAL ENCOUNTER (OUTPATIENT)
Facility: MEDICAL CENTER | Age: 60
End: 2025-07-30
Attending: ORTHOPAEDIC SURGERY | Admitting: ORTHOPAEDIC SURGERY
Payer: COMMERCIAL

## 2025-07-30 ENCOUNTER — ANESTHESIA (OUTPATIENT)
Dept: SURGERY | Facility: MEDICAL CENTER | Age: 60
End: 2025-07-30
Payer: COMMERCIAL

## 2025-07-30 ASSESSMENT — FIBROSIS 4 INDEX: FIB4 SCORE: 1.53

## 2025-07-30 ASSESSMENT — PAIN DESCRIPTION - PAIN TYPE: TYPE: ACUTE PAIN

## 2025-07-30 NOTE — ANESTHESIA PREPROCEDURE EVALUATION
Case: [id number] Date/Time: 07/30/25 0830    Procedure: ROBOTIC LEFT TOTAL KNEE ARTHROPLASTY    Diagnosis: Degenerative arthritis of left knee [M17.12]    Pre-op diagnosis: Degenerative arthritis of left knee [CCCCCC]    Location: [place] [address] / SURGERY [facility]    Surgeons: Bryon Levine M.D.            Relevant Problems   PULMONARY   (positive) Mild persistent asthma without complication      CARDIAC   (positive) Venous insufficiency      Other   (positive) Cervical myelopathy (HCC)   (positive) Chronic bilateral low back pain with bilateral sciatica- pain mgt;    spine nv   (positive) Chronic pain syndrome   (positive) DDD (degenerative disc disease), lumbar   (positive) Degenerative arthritis of left knee   (positive) Uncomplicated opioid dependence (CMS-HCC)- spine nv   asthma    Physical Exam    Airway   Mallampati: II  TM distance: >3 FB  Neck ROM: full       Cardiovascular - normal exam  Rhythm: regular  Rate: normal    (-) murmur     Dental - normal exam           Pulmonary - normal examBreath sounds clear to auscultation     Abdominal    Neurological - normal exam                   Anesthesia Plan    ASA 2       Plan - general       Airway plan will be ETT          Induction: intravenous    Postoperative Plan: Postoperative administration of opioids is intended.    Pertinent diagnostic labs and testing reviewed    Informed Consent:    Anesthetic plan and risks discussed with patient.    Use of blood products discussed with: patient whom consented to blood products.

## 2025-07-30 NOTE — INTERVAL H&P NOTE
Consented Procedure: ROBOTIC LEFT TOTAL KNEE ARTHROPLASTY  I have examined the patient, provided the risks, benefits, and alternatives to the procedure(s) indicated on the signed consent form, and the patient wishes to proceed.    H&P reviewed. The patient was examined and there are no changes to the H&P      Bryon Levine M.D.  07/30/25 8:42 AM

## 2025-07-30 NOTE — LETTER
July 22, 2025    Patient Name: Karine Ovalle  Surgeon Name: Bryon Levine M.D.  Surgery Facility: [facility] ([address] [facility])  Surgery Date: 7/30/2025    The time of your surgery is not final and may change up to and until the day of your surgery. You will be contacted 24-48 hours prior to your surgery date with your check-in and surgery time.    If you will not be at one of the below numbers please call the surgery scheduler at [phone]  Preferred Phone: [phone]    BEFORE YOUR SURGERY   Pre Registration and/or Lab Work must be done within and no earlier than 28 days prior to your surgery date. Your scheduled facility will contact you regarding all required preregistration and/or lab work. If you have not been contacted within 7 days of your scheduled procedure please call [facility] at [phone] for an appointment as soon as possible.    Pre op Appointment:   Date: 7/28/25   Time: 8:15 AM   Provider: Bryon Levine MD   Location: OVER THE PHONE - DR LEVINE OR [facility] WILL CALL YOU   Instructions: Bring a list of all medications you are taking including the dosing and frequency.    - Please  your non-narcotic prescriptions prior to surgery at the pharmacy you provided us. DON'T START them until after your surgery.    DAY OF YOUR SURGERY    Nothing to eat or drink after midnight the night before surgery. This includes mints, gums, etc.     Refrain from smoking any substance or consuming any tobacco products after midnight prior to surgery. It may interfere with the anesthetic and frequently produces nausea during the recovery period.    Continue taking all lifesaving medications. Including the morning of your surgery with small sip of water.  If you have questions regarding what medication you can take on the day of surgery, please contact the preadmit department ([phone]).    Please do NOT take on the day of  "surgery:  Any diabetic medications  Diuretics: examples- Furosemide (Lasix), Spironolactone, Hydrochlorothiazide.   Ace Inhibitors: examples - Lisinopril, Ramipril, Enalapril  \"ARB's\": examples: Losartan, Olmesartan, Valsartan    Please arrive at the hospital/surgery center at the check-in time provided.   An adult will need to bring you and take you home after your surgery.     AFTER YOUR SURGERY  Post op Appointment:   Date: 8/11   Time: 9:40 AM   With: Bryon Levine MD   Location: [address] [place] [id number]    - Therapy- Your first appointment should be 1  week(s) after your surgery. For your convenience we have 4 Physical Therapy locations: [place], [facility], [place], and [facility]. Call our office ASAP to schedule an appointment at [phone] or take the enclosed Therapy Prescription to a facility of your choice.  - No dental work for 3-6 months after your surgery.  - You must have someone provide transportation post surgery and someone to monitor you for at least 24 hours post-surgery. If you don't have either of these your appointment will be canceled.     TIME OFF WORK  FMLA or Disability forms can be faxed directly to: [phone] or you may drop them off at [address] [place] [address]. Our office charges a $35.00 fee per form. Forms will be completed within 10 business days of drop off and payment received. For the status of your forms you may contact our disability office directly [phone].    MEDICATION INSTRUCTIONS **Please read section completely**    Please consult your prescribing physician if you are on life saving blood thinners (Plavix, Coumadin, Eliquis, Xarelto, etc.) for when to stop prior to surgery    The following medications should be stopped a minimum of 14 days prior to surgery:    Anorectics: Phentermine (Adipex-P, Lomaira and Suprenza), Phentermine-topiramate (Qsymia),   Bupropion-naltrexone (Contrave)    **If you are on Bupropion for " anxiety/depression, please continue this medication up until the day of surgery.     The following should be stopped a minimum of 7 days prior to surgery:  All vitamins and supplements  Ozempic, Trulicity, Victoza    The following medications should be stopped 5 days prior to surgery:  Anti-Inflammatories: examples- Aspirin, Aspirin products, Ibuprofen/Advil, Aleve, Naproxen, Meloxicam, Celebrex, etc.    The following medications should be stopped 4 days prior to surgery:  Certain oral diabetic medications: ertugliflozin (Steglatro)    The following medications should be stopped a minimum of 3 days prior to surgery:  Certain oral diabetic medications: canagliflozin (Invokana), dapagliflozin (Farxiga), empagliflozin (Jardiance)  Opiod Partial Agonists/Opioid Antagonists: Buprenorphine (Suboxone, Belbuca, Butrans, Probuphine Implant, Sublocade), Naltrexone (ReVia, Vivitrol), Naloxone  PDE-5 inhibitors: Sildenafil (Viagra), Tadalafil (Cialis), Vardenafil (Levitra), Avanafil (Stendra)  MAO Inhibitors: Rasagiline (Azilect), Selegiline (Eldepryl, Emsam, Selapar), Isocarboxazid (Marplan), Phenelzine (Nardil)         Thank you,     [facility]    Contact Us:  [facility]   [phone]  Web: [facility]

## 2025-08-10 DIAGNOSIS — M50.30 DDD (DEGENERATIVE DISC DISEASE), CERVICAL: ICD-10-CM

## 2025-08-10 DIAGNOSIS — M51.369 DDD (DEGENERATIVE DISC DISEASE), LUMBAR: ICD-10-CM

## 2025-08-10 DIAGNOSIS — M54.42 CHRONIC BILATERAL LOW BACK PAIN WITH BILATERAL SCIATICA: ICD-10-CM

## 2025-08-10 DIAGNOSIS — M54.41 CHRONIC BILATERAL LOW BACK PAIN WITH BILATERAL SCIATICA: ICD-10-CM

## 2025-08-10 DIAGNOSIS — G89.29 CHRONIC BILATERAL LOW BACK PAIN WITH BILATERAL SCIATICA: ICD-10-CM

## 2025-08-11 RX ORDER — METHOCARBAMOL 500 MG/1
TABLET, FILM COATED ORAL
Qty: 240 TABLET | Refills: 0 | Status: SHIPPED | OUTPATIENT
Start: 2025-08-11

## (undated) DEVICE — GLOVE BIOGEL SZ 8 SURGICAL PF LTX - (50PR/BX 4BX/CA)

## (undated) DEVICE — CANISTER SUCTION 3000ML MECHANICAL FILTER AUTO SHUTOFF MEDI-VAC NONSTERILE LF DISP  (40EA/CA)

## (undated) DEVICE — GOWN WARMING STANDARD FLEX - (30/CA)

## (undated) DEVICE — GLOVE BIOGEL PI INDICATOR SZ 8.0 SURGICAL PF LF -(50/BX 4BX/CA)

## (undated) DEVICE — NEPTUNE 4 PORT MANIFOLD - (20/PK)

## (undated) DEVICE — COVER MAYO STAND X-LG - (22EA/CA)

## (undated) DEVICE — PACK TOTAL HIP - (1/CA)

## (undated) DEVICE — DRAPE C ARMOR (12EA/CA)

## (undated) DEVICE — TUBE CONNECT SUCTION CLEAR 120 X 1/4" (50EA/CA)"

## (undated) DEVICE — SUTURE 3-0 VICRYL PLUS RB-1 - 8 X 18 INCH (12/BX)

## (undated) DEVICE — STERI STRIP COMPOUND BENZOIN - TINCTURE 0.6ML WITH APPLICATOR (40EA/BX)

## (undated) DEVICE — GLOVE BIOGEL INDICATOR SZ 6.5 SURGICAL PF LTX - (50PR/BX 4BX/CA)

## (undated) DEVICE — CHLORAPREP 26 ML APPLICATOR - ORANGE TINT(25/CA)

## (undated) DEVICE — SUTURE GENERAL

## (undated) DEVICE — SET EXTENSION WITH 2 PORTS (48EA/CA) ***PART #2C8610 IS A SUBSTITUTE*****

## (undated) DEVICE — SODIUM CHL. IRRIGATION 0.9% 3000ML (4EA/CA 65CA/PF)

## (undated) DEVICE — GLOVE BIOGEL ECLIPSE PF LATEX SIZE 8.0  (50PR/BX)

## (undated) DEVICE — HANDPIECE 10FT INTPLS SCT PLS IRRIGATION HAND CONTROL SET (6/PK)

## (undated) DEVICE — SPONGE GAUZESTER 4 X 4 4PLY - (128PK/CA)

## (undated) DEVICE — SYRINGE SAFETY TB 1 ML 27 GA X 1/2 IN (100/BX 4BX/CA)

## (undated) DEVICE — KIT ROOM DECONTAMINATION

## (undated) DEVICE — PACK NEURO - (2EA/CA)

## (undated) DEVICE — SUTURE 0 VICRYL PLUS CT-1 - 36 INCH (36/BX)

## (undated) DEVICE — ELECTRODE DUAL RETURN W/ CORD - (50/PK)

## (undated) DEVICE — DRESSING POST OP BORDER 4 X 10 (5EA/BX)

## (undated) DEVICE — DRAPE STRLE REG TOWEL 18X24 - (10/BX 4BX/CA)"

## (undated) DEVICE — TOWELS CLOTH SURGICAL - (4/PK 20PK/CA)

## (undated) DEVICE — WATER IRRIG. STER. 1500 ML - (9/CA)

## (undated) DEVICE — SYSTEM PREVENA INCISION MNGM - (1/EA)

## (undated) DEVICE — RESTRAINTS LIMB DISP. - (12/BX 4BX/CA)

## (undated) DEVICE — LACTATED RINGERS INJ 1000 ML - (14EA/CA 60CA/PF)

## (undated) DEVICE — GLOVE BIOGEL INDICATOR SZ 8 SURGICAL PF LTX - (50/BX 4BX/CA)

## (undated) DEVICE — PROTECTOR ULNA NERVE - (36PR/CA)

## (undated) DEVICE — SUCTION INSTRUMENT YANKAUER BULBOUS TIP W/O VENT (50EA/CA)

## (undated) DEVICE — SUTURE 2-0 SILK 12 X 18" (36PK/BX)"

## (undated) DEVICE — SOD. CHL. INJ. 0.9% 1000 ML - (14EA/CA 60CA/PF)

## (undated) DEVICE — GLOVE BIOGEL INDICATOR SZ 7SURGICAL PF LTX - (50/BX 4BX/CA)

## (undated) DEVICE — SHEET PEDIATRIC LAPAROTOMY - (10/CA)

## (undated) DEVICE — IMPLANT FLEXIBLE DRILL 25MM (1EA)

## (undated) DEVICE — SODIUM CHL IRRIGATION 0.9% 1000ML (12EA/CA)

## (undated) DEVICE — GLOVE BIOGEL INDICATOR SZ 7.5 SURGICAL PF LTX - (50PR/BX 4BX/CA)

## (undated) DEVICE — GLOVE BIOGEL INDICATOR SZ 8.5 SURGICAL PF LTX - (50/BX 4BX/CA)

## (undated) DEVICE — STOCKINET TUBULAR 6IN STERILE - 6 X 48YDS (25/CA)

## (undated) DEVICE — SET LEADWIRE 5 LEAD BEDSIDE DISPOSABLE ECG (1SET OF 5/EA)

## (undated) DEVICE — GLOVE BIOGEL PI INDICATOR SZ 6.5 SURGICAL PF LF - (50/BX 4BX/CA)

## (undated) DEVICE — BLADE SURGICAL CLIPPER - (50EA/CA)

## (undated) DEVICE — GLOVE BIOGEL SZ 6.5 SURGICAL PF LTX (50PR/BX 4BX/CA)

## (undated) DEVICE — GOWN SURGEONS X-LARGE - DISP. (30/CA)

## (undated) DEVICE — SYRINGE DISP. 60 CC LL - (30/BX, 12BX/CA)**WHEN THESE ARE GONE ORDER #500206**

## (undated) DEVICE — PACK JACKSON TABLE KIT W/OUT - HR (6EA/CA)

## (undated) DEVICE — PAD LAP STERILE 18 X 18 - (5/PK 40PK/CA)

## (undated) DEVICE — SENSOR SPO2 NEO LNCS ADHESIVE (20/BX) SEE USER NOTES

## (undated) DEVICE — SLEEVE, VASO, THIGH, MED

## (undated) DEVICE — HEAD HOLDER JUNIOR/ADULT

## (undated) DEVICE — LENS/HOOD FOR SPACESUIT - (32/PK) PEEL AWAY FACE

## (undated) DEVICE — GLOVE BIOGEL PI ORTHO SZ 7 PF LF (40PR/BX)

## (undated) DEVICE — KIT ANESTHESIA W/CIRCUIT & 3/LT BAG W/FILTER (20EA/CA)

## (undated) DEVICE — NEEDLE SPINAL NON-SAFETY 18 GA X 3 IN (25EA/BX)

## (undated) DEVICE — SUTURE 0 VICRYL PLUS CT-1 - 8 X 18 INCH (12/BX)

## (undated) DEVICE — STAPLER SKIN DISP - (6/BX 10BX/CA) VISISTAT

## (undated) DEVICE — GLOVE SZ 7 BIOGEL PI MICRO - PF LF (50PR/BX 4BX/CA)

## (undated) DEVICE — BONE PRESS SPINAL EDITION HENSLER (10EA/CA)

## (undated) DEVICE — SUTURE 2-0 VICRYL PLUS CT-1 - 8 X 18 INCH(12/BX)

## (undated) DEVICE — SYRINGE SAFETY 3 ML 18 GA X 1 1/2 BLUNT LL (100/BX 8BX/CA)

## (undated) DEVICE — SUTURE 3-0 MONOCRYL PLUS PS-1 - 27 INCH (36/BX)

## (undated) DEVICE — SUTURE 1 VICRYL PLUS CTX - 8 X 18 INCH (12/BX)

## (undated) DEVICE — BOVIE BLADE COATED &INSULATED - 25/PK

## (undated) DEVICE — TUBING C&T SET FLYING LEADS DRAIN TUBING (10EA/BX)

## (undated) DEVICE — RESERVOIR SUCTION 100 CC - SILICONE (20EA/CA)

## (undated) DEVICE — ELECTRODE 850 FOAM ADHESIVE - HYDROGEL RADIOTRNSPRNT (50/PK)

## (undated) DEVICE — GLOVE BIOGEL SZ 6 PF LATEX - (50EA/BX 4BX/CA)

## (undated) DEVICE — DETERGENT RENUZYME PLUS 10 OZ PACKET (50/BX)

## (undated) DEVICE — DRESSING NON-ADHERING 8 X 3 - (50/BX)

## (undated) DEVICE — GLOVE BIOGEL SZ 7.5 SURGICAL PF LTX - (50PR/BX 4BX/CA)

## (undated) DEVICE — DRAPE LARGE 3 QUARTER - (20/CA)

## (undated) DEVICE — MASK ANESTHESIA ADULT  - (100/CA)

## (undated) DEVICE — GLOVE SZ 8 BIOGEL PI MICRO - PF LF (50PR/BX)

## (undated) DEVICE — TRAY SPINAL ANESTHESIA NON-SAFETY (10/CA)

## (undated) DEVICE — GLOVE BIOGEL ECLIPSE  PF LATEX SIZE 6.5 (50PR/BX)

## (undated) DEVICE — INTRAOP NEURO IN OR 1:1 PER 15 MIN

## (undated) DEVICE — SCREW DISTRACTION 14MM YELLOW - STERILE (10EA/BX) (5TX4=20)

## (undated) DEVICE — DRAIN JACKSON PRATT 10FR - (10/CS)

## (undated) DEVICE — SPONGE PEANUT - (5/PK 50PK/CA)

## (undated) DEVICE — TUBING CLEARLINK DUO-VENT - C-FLO (48EA/CA)

## (undated) DEVICE — SYRINGE SAFETY 10 ML 18 GA X 1 1/2 BLUNT LL (100/BX 4BX/CA)

## (undated) DEVICE — MIDAS LUBRICATOR DIFFUSER PACK (4EA/CA)

## (undated) DEVICE — GVL 3 STAT DISPOSABLE - (10/BX)

## (undated) DEVICE — BLADE SAGITTAL SAW DUAL CUT 75.0 X 25.0MM (1/EA)

## (undated) DEVICE — GLOVE BIOGEL PI INDICATOR SZ 7.0 SURGICAL PF LF - (50/BX 4BX/CA)

## (undated) DEVICE — DRAPE LAPAROTOMY T SHEET - (12EA/CA)

## (undated) DEVICE — LACTATED RINGERS INJ. 500 ML - (24EA/CA)

## (undated) DEVICE — GOWN SURGEONS LARGE - (32/CA)

## (undated) DEVICE — TIP INTPLS HFLO ML ORFC BTRY - (12/CS)  FOR SURGILAV

## (undated) DEVICE — CLOSURE SKIN STRIP 1/4 X 3 IN - (STERI STRIP) (50/BX)

## (undated) DEVICE — TUBE E-T HI-LO CUFF 7.5MM (10EA/PK)

## (undated) DEVICE — GLOVE BIOGEL ECLIPSE PF LATEX SIZE 7.5

## (undated) DEVICE — CLOSURE SKIN STRIP 1/2 X 4 IN - (STERI STRIP) (50/BX 4BX/CA)

## (undated) DEVICE — DRILL BIT 14MM

## (undated) DEVICE — GLOVE BIOGEL SZ 7 SURGICAL PF LTX - (50PR/BX 4BX/CA)

## (undated) DEVICE — KIT SURGIFLO W/OUT THROMBIN - (6EA/CA)

## (undated) DEVICE — TOOL DISSECT MATCH HEAD

## (undated) DEVICE — TRAY FOLEY CATHETER STATLOCK 16FR SURESTEP  (10EA/CA)

## (undated) DEVICE — SYRINGE ASEPTO - (50EA/CA

## (undated) DEVICE — PAD OR TABLE DA VINCI 2IN X 20IN X 72IN - (12EA/CA)

## (undated) DEVICE — ARMREST CRADLE FOAM - (2PR/PK 12PR/CA)

## (undated) DEVICE — HEADREST PRONEVIEW LARGE - (10/CA)

## (undated) DEVICE — PAD UNIVERSAL MULTI USE (1/EA)

## (undated) DEVICE — APPLICATOR COTTON TIP 6 IN - STERILE (2EA/PK 100PK/BX)

## (undated) DEVICE — NEEDLE NON SAFETY HYPO 22 GA X 1 1/2 IN (100/BX)

## (undated) DEVICE — SYRINGE SAFETY 5 ML 18 GA X 1-1/2 BLUNT LL (100/BX 4BX/CA)

## (undated) DEVICE — KIT EVACUATER 3 SPRING PVC LF 1/8 DRAIN SIZE (10EA/CA)"

## (undated) DEVICE — NEEDLE SAFETY 18 GA X 1 1/2 IN (100EA/BX)

## (undated) DEVICE — BLADE SAGITTAL SYSTEM 18MM

## (undated) DEVICE — PIN HEAD MAYFIELD DISP. (3EA/PK 12PK/BX)